# Patient Record
Sex: MALE | Race: WHITE | NOT HISPANIC OR LATINO | URBAN - METROPOLITAN AREA
[De-identification: names, ages, dates, MRNs, and addresses within clinical notes are randomized per-mention and may not be internally consistent; named-entity substitution may affect disease eponyms.]

---

## 2018-10-27 ENCOUNTER — EMERGENCY (EMERGENCY)
Facility: HOSPITAL | Age: 28
LOS: 1 days | Discharge: HOME | End: 2018-10-27
Admitting: EMERGENCY MEDICINE
Payer: MEDICAID

## 2018-10-27 VITALS
TEMPERATURE: 98 F | HEART RATE: 76 BPM | RESPIRATION RATE: 18 BRPM | OXYGEN SATURATION: 99 % | SYSTOLIC BLOOD PRESSURE: 137 MMHG | DIASTOLIC BLOOD PRESSURE: 71 MMHG

## 2018-10-27 PROCEDURE — 99283 EMERGENCY DEPT VISIT LOW MDM: CPT

## 2018-10-27 RX ORDER — HALOPERIDOL DECANOATE 100 MG/ML
5 INJECTION INTRAMUSCULAR ONCE
Qty: 0 | Refills: 0 | Status: COMPLETED | OUTPATIENT
Start: 2018-10-27 | End: 2018-10-27

## 2018-10-27 RX ORDER — DIPHENHYDRAMINE HCL 50 MG
50 CAPSULE ORAL ONCE
Qty: 0 | Refills: 0 | Status: COMPLETED | OUTPATIENT
Start: 2018-10-27 | End: 2018-10-27

## 2018-10-27 RX ORDER — CHLORPROMAZINE HCL 10 MG
50 TABLET ORAL ONCE
Qty: 0 | Refills: 0 | Status: COMPLETED | OUTPATIENT
Start: 2018-10-27 | End: 2018-10-27

## 2018-10-27 RX ADMIN — Medication 2 MILLIGRAM(S): at 21:15

## 2018-10-27 RX ADMIN — HALOPERIDOL DECANOATE 5 MILLIGRAM(S): 100 INJECTION INTRAMUSCULAR at 21:15

## 2018-10-27 RX ADMIN — Medication 50 MILLIGRAM(S): at 22:09

## 2018-10-27 NOTE — ED PROVIDER NOTE - MEDICAL DECISION MAKING DETAILS
27 y/o M  Medical evaluation performed. There is no clinical evidence of intoxication or any acute medical problem requiring immediate intervention. Recommend following up with [facility]. 27 y/o M  Medical evaluation performed. There is no clinical evidence of intoxication or any acute medical problem requiring immediate intervention. Recommend following up with [facility]. D/C to group home. Discuss plan with group home staff.

## 2018-10-27 NOTE — ED ADULT NURSE NOTE - OBJECTIVE STATEMENT
Patient received in  c/o agitation, Patient BIB EMS & [facility] in handcuffs. Patient remained agitated and uncooperative on presentation, unable to be verbally deescalated. Patient became physically aggressive when handcuffs was being taken off. Patient restrained for safety at 2115 hours and medicated with haldol 5mg and ativan 2mg IM for stabilization. 1:1 observation maintained. will continue to monitor.
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
## 2018-10-27 NOTE — ED PROVIDER NOTE - OBJECTIVE STATEMENT
29 y/o M BIBA w c/o increase agitation. Admits that he mentioned using a gun to family member because he was upset, but has no intention of using the gun . Patient explicitly denies suicidal ideations.  No evidence of physical injuries, broken skin or deformities. Denies falling, punching or kicking any objects.  Denies HI/AH/VH. Denies SOB , fever, chills, chest/alcohol discomfort.  Denies recent use of  alcohol or illicit drugs. 27 y/o M BIBA w c/o increase agitation while at group home.  Admits that he mentioned using a gun to family member because he was upset, but has no intention of using the gun . Patient explicitly denies suicidal ideations.  No evidence of physical injuries, broken skin or deformities. Denies falling, punching or kicking any objects.  Denies HI/AH/VH. Denies SOB , fever, chills, chest/alcohol discomfort.  Denies recent use of  alcohol or illicit drugs.
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
## 2018-10-28 VITALS
HEART RATE: 78 BPM | RESPIRATION RATE: 18 BRPM | OXYGEN SATURATION: 95 % | TEMPERATURE: 98 F | SYSTOLIC BLOOD PRESSURE: 106 MMHG | DIASTOLIC BLOOD PRESSURE: 70 MMHG

## 2018-10-28 PROBLEM — Z00.00 ENCOUNTER FOR PREVENTIVE HEALTH EXAMINATION: Status: ACTIVE | Noted: 2018-10-28

## 2018-10-28 NOTE — ED ADULT NURSE REASSESSMENT NOTE - NS ED NURSE REASSESS COMMENT FT1
Pt received at 12:00 am shift change. Pt discharged; Swati [phone] at pts residence made aware that EMS is currently at [facility] and pt will be returning. Pts exit vitals as noted. Pt remains drowsy, unable to sign dc paper.
Patient remains in restraints, restless and unpredictable. Patient medicated for safety and stabilization (see EMR). Will continue to monitor and reassess for readiness to come off restraints.
Patient calm at this time. criteria for restraints discontinuation met. restraints d/c'd at 2215. safety and comfort measures maintained. will continue to monitor.

## 2018-12-20 ENCOUNTER — EMERGENCY (EMERGENCY)
Facility: HOSPITAL | Age: 28
LOS: 1 days | Discharge: HOME | End: 2018-12-20
Attending: EMERGENCY MEDICINE | Admitting: EMERGENCY MEDICINE
Payer: MEDICAID

## 2018-12-20 ENCOUNTER — EMERGENCY (EMERGENCY)
Facility: HOSPITAL | Age: 28
LOS: 1 days | Discharge: HOME | End: 2018-12-20
Admitting: EMERGENCY MEDICINE
Payer: MEDICAID

## 2018-12-20 VITALS
OXYGEN SATURATION: 100 % | HEART RATE: 93 BPM | RESPIRATION RATE: 16 BRPM | DIASTOLIC BLOOD PRESSURE: 78 MMHG | SYSTOLIC BLOOD PRESSURE: 138 MMHG

## 2018-12-20 VITALS
SYSTOLIC BLOOD PRESSURE: 124 MMHG | DIASTOLIC BLOOD PRESSURE: 76 MMHG | HEART RATE: 76 BPM | RESPIRATION RATE: 16 BRPM | OXYGEN SATURATION: 100 % | TEMPERATURE: 98 F

## 2018-12-20 DIAGNOSIS — F79 UNSPECIFIED INTELLECTUAL DISABILITIES: ICD-10-CM

## 2018-12-20 DIAGNOSIS — R69 ILLNESS, UNSPECIFIED: ICD-10-CM

## 2018-12-20 DIAGNOSIS — F39 UNSPECIFIED MOOD [AFFECTIVE] DISORDER: ICD-10-CM

## 2018-12-20 PROCEDURE — 99283 EMERGENCY DEPT VISIT LOW MDM: CPT

## 2018-12-20 PROCEDURE — 90792 PSYCH DIAG EVAL W/MED SRVCS: CPT

## 2018-12-20 PROCEDURE — 99285 EMERGENCY DEPT VISIT HI MDM: CPT

## 2018-12-20 RX ORDER — DIPHENHYDRAMINE HCL 50 MG
50 CAPSULE ORAL ONCE
Qty: 0 | Refills: 0 | Status: COMPLETED | OUTPATIENT
Start: 2018-12-20 | End: 2018-12-20

## 2018-12-20 RX ORDER — HALOPERIDOL DECANOATE 100 MG/ML
5 INJECTION INTRAMUSCULAR ONCE
Qty: 0 | Refills: 0 | Status: COMPLETED | OUTPATIENT
Start: 2018-12-20 | End: 2018-12-20

## 2018-12-20 RX ORDER — TETANUS TOXOID, REDUCED DIPHTHERIA TOXOID AND ACELLULAR PERTUSSIS VACCINE, ADSORBED 5; 2.5; 8; 8; 2.5 [IU]/.5ML; [IU]/.5ML; UG/.5ML; UG/.5ML; UG/.5ML
0.5 SUSPENSION INTRAMUSCULAR ONCE
Qty: 0 | Refills: 0 | Status: COMPLETED | OUTPATIENT
Start: 2018-12-20 | End: 2018-12-20

## 2018-12-20 RX ADMIN — HALOPERIDOL DECANOATE 5 MILLIGRAM(S): 100 INJECTION INTRAMUSCULAR at 20:08

## 2018-12-20 RX ADMIN — Medication 2 MILLIGRAM(S): at 20:08

## 2018-12-20 RX ADMIN — TETANUS TOXOID, REDUCED DIPHTHERIA TOXOID AND ACELLULAR PERTUSSIS VACCINE, ADSORBED 0.5 MILLILITER(S): 5; 2.5; 8; 8; 2.5 SUSPENSION INTRAMUSCULAR at 12:21

## 2018-12-20 RX ADMIN — Medication 50 MILLIGRAM(S): at 20:08

## 2018-12-20 NOTE — ED BEHAVIORAL HEALTH ASSESSMENT NOTE - RISK ASSESSMENT
low. risks include poor frustration tolerance and impulsivity. Protective factors include no suicide attempts, no violence history, medication compliance, no access to guns, no global insomnia, no substance abuse, supportive family, willingness to seek help, no suicidal ideation or homicidal ideation, hopefulness for future.

## 2018-12-20 NOTE — ED ADULT NURSE REASSESSMENT NOTE - NS ED NURSE REASSESS COMMENT FT1
pt calm & cooperative back to baseline pt d/c by NP pt & staff transported via EMS to Respite program.
Yes, Non-Core measure site...

## 2018-12-20 NOTE — ED ADULT NURSE REASSESSMENT NOTE - NS ED NURSE REASSESS COMMENT FT1
Pt calm & cooperative d/c by MD pt verbalizing understanding of d/c instructions, pt currently denies Si/Hi/AVh resources provided to pt upon discharge.

## 2018-12-20 NOTE — ED BEHAVIORAL HEALTH ASSESSMENT NOTE - DESCRIPTION
calm and cooperative  ICU Vital Signs Last 24 Hrs  T(C): --  T(F): --  HR: 93 (20 Dec 2018 10:32) (93 - 93)  BP: 138/78 (20 Dec 2018 10:32) (138/78 - 138/78)  BP(mean): --  ABP: --  ABP(mean): --  RR: 16 (20 Dec 2018 10:32) (16 - 16)  SpO2: 100% (20 Dec 2018 10:32) (100% - 100%) denied lives in a psychiatric residence, disabled

## 2018-12-20 NOTE — ED PROVIDER NOTE - MEDICAL DECISION MAKING DETAILS
29 y/o MR male from group home BIB EMS for SI. Physical examination performed.  No clinical evidence of intoxication or any acute medical issues/problems requiring immediate interventions.  Psychiatric consultation requested and patient cleared for safe discharge . 27 y/o MR male from group [place] BIB EMS for SI. Physical examination performed.  Self inflicted wound to left shoulder cleaned, bacitracin applied and covered with clean, dry dressing.  No clinical evidence of intoxication or any acute medical issues/problems requiring immediate interventions.  Psychiatric consultation requested and patient cleared for safe discharge .

## 2018-12-20 NOTE — ED BEHAVIORAL HEALTH ASSESSMENT NOTE - SAFETY PLAN DETAILS
Extensive safety planning performed. Patient and staff agreeing verbally to return patient to ER or call 911 if symptoms worsen or patient has urges to harm self or others.
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
## 2018-12-20 NOTE — ED BEHAVIORAL HEALTH ASSESSMENT NOTE - SUICIDE PROTECTIVE FACTORS
Supportive social network or family/Identifies reasons for living/Future oriented/Engaged in work or school/Positive therapeutic relationships/Responsibility to family and others/Fear of death or dying due to pain/suffering
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
## 2018-12-20 NOTE — ED BEHAVIORAL HEALTH ASSESSMENT NOTE - OTHER PAST PSYCHIATRIC HISTORY (INCLUDE DETAILS REGARDING ONSET, COURSE OF ILLNESS, INPATIENT/OUTPATIENT TREATMENT)
PPH of mood disorder, intellectual disability and factitious disorder, 4 reported inpatient admissions (he cannot recall when last one was), no prior suicide attempts, no know history of substance abuse

## 2018-12-20 NOTE — ED PROVIDER NOTE - OBJECTIVE STATEMENT
29 y/o MR male from [facility] EMS for SI.  Pt is laughing and giddy in  triage area.  Seen by psychiatrist today and was just discharged 3 hours ago for the same complaint.  PT sarcastically c/o SI and HI.  Pt does not answer regarding AH/VH.

## 2018-12-20 NOTE — ED BEHAVIORAL HEALTH NOTE - BEHAVIORAL HEALTH NOTE
Have alerted  [facility] [phone] that the treating psychiatrist has cleared the patient for return to program. Have discussed options for out patient or walk in follow up at various clinics near to the respite program. Have also recommended staff press charges if patient becomes violent so as to reinforce consequences for his chosen maladaptive behaviors. [facility] is aware and will receive patient.

## 2018-12-20 NOTE — ED PROVIDER NOTE - SKIN, MLM
+abrasion to anterior left shoulder with dried blood, no active bleeding; neurovascularly intact in LUE

## 2018-12-20 NOTE — ED BEHAVIORAL HEALTH ASSESSMENT NOTE - CURRENT MEDICATION
divalproex 500mg po qam and 1000mg qHS; haloperidol 10mg po BID; benztropine 1mg po BID; docusate 100mg po TID, clonidine 0.1mg po TID; omeprazole 20mg po qAM; simvastatin 5mg po daily; hydroxy hcl 25mg po BID; levothyroxine 50mcg po daily; vitamin d3 1000IU po daily

## 2018-12-20 NOTE — ED ADULT NURSE NOTE - CHIEF COMPLAINT QUOTE
from adult home, took plastic and scratched left shoulder. pt was upset bc tita broke up with him. pt in handcuffs at this time. pmhx anxiety, mood disorder.

## 2018-12-20 NOTE — ED ADULT NURSE NOTE - OBJECTIVE STATEMENT
Received pt in  pt bought in for psych eval  as per EMS pt was threatening Si/Hi prior to arrival in the ED. pt calm & cooperative denies Si/Hi/AVh presently safety & comfort measures maintained eval on going.

## 2018-12-20 NOTE — ED BEHAVIORAL HEALTH ASSESSMENT NOTE - SUMMARY
29yo  male, single, domiciled with peers at the [facility] center, PPH of mood disorder, intellectual disability and factitious disorder, 4 reported inpatient admissions (he cannot recall when last one was), no prior suicide attempts, no know history of substance abuse, brought in by EMS for agitation.    Patient denies SI/HI/I/P as well as everett and psychosis. Admits to running out of respite to [facility] with purpose of getting away from staff due to feeling upset over incident yesterday. Patient denies suicidal intent requesting to go back to his group home. Patient denies SI/HI/SIB/intent/plan, depression, everett/hypomania, psychosis or any other mental health issues. Patient's behavior is behavioral in nature due to chronic poor frustration tolerance, impulsivity and impaired delayed instant gratification related to his cognitive limitations and not related to an exacerbation of an axis I diagnosis. Patient is future oriented and able to safety plan. Patient is not seeking and refused inpatient admission. Patient does not meet criteria for involuntary inpatient admission and has secondary gains. He will be discharged to group home and will follow up with outpatient provider. Extensive safety planning performed. Patient and staff agreeing verbally to return patient to ER or call 911 if symptoms worsen or patient has urges to harm self or others.

## 2018-12-20 NOTE — ED BEHAVIORAL HEALTH ASSESSMENT NOTE - SUICIDE PROTECTIVE FACTORS
Fear of death or dying due to pain/suffering/Engaged in work or school/Positive therapeutic relationships/Identifies reasons for living/Responsibility to family and others/Supportive social network or family/Future oriented

## 2018-12-20 NOTE — ED ADULT NURSE NOTE - OBJECTIVE STATEMENT
Received pt in  pt sent from day program for agitation , pt calm & cooperative denies Si/Hi/AVh presently emotional reassurance provided safety & comfort measures maintained eval on going. Received pt in  pt sent from day program for agitation & self harming behaviour pt noted with Right hand abrasion & Left shoulder abrasion, pt calm & cooperative denies Si/Hi/AVh presently emotional reassurance provided safety & comfort measures maintained eval on going.

## 2018-12-20 NOTE — ED BEHAVIORAL HEALTH ASSESSMENT NOTE - SUMMARY
27yo  male, single, domiciled with peers at the [facility], PPH of mood disorder, intellectual disability and factitious disorder, 4 reported inpatient admissions (he cannot recall when last one was), no prior suicide attempts, no know history of substance abuse, brought in by EMS, activated by day program, after patient became upset and took a plastic electrical outlet cover and cut his shoulder with it.     Patient denies SI/HI/I/P as well as everett and psychosis. Admits to cutting shoulder out of frustration secondary to girlfriend dumping him. Denies suicidal intent, requesting to go back to his group home. Staff at [facility] have no acute safety concerns. Patient does not meet criteria for inpatient admission and will be discharged home.

## 2018-12-20 NOTE — ED BEHAVIORAL HEALTH ASSESSMENT NOTE - HPI (INCLUDE ILLNESS QUALITY, SEVERITY, DURATION, TIMING, CONTEXT, MODIFYING FACTORS, ASSOCIATED SIGNS AND SYMPTOMS)
29yo  male, single, domiciled with peers at the [facility], PPH of mood disorder, intellectual disability and factitious disorder, 4 reported inpatient admissions (he cannot recall when last one was), no prior suicide attempts, no know history of substance abuse, brought in by EMS, activated by day program, after patient became upset and took a plastic electrical outlet cover and cut his shoulder with it.     collateral obtained from staff. She did not feel that patient was suicidal, only frustrated. She reports it is their protocol to send patient when they act as such. She has no acute safety concerns and will arrange transport home for patient.    Patient seen and examined. He reports he was frustrated that his girlfriend dumped him so he started cutting himself. When asked what had happened, he showed writer his forearm with a new tattoo that said "celine" on it. He said it was not his girlfriend's name, but his 1yo niece's name. When asked if girlfriend knew this, he said "no, do you think that's why she dumped me?" Patient denies SI/HI/I/P. Patient denies any depressive symptoms including depressed mood, anhedonia, changes in energy/concentration/appetite, sleep disturbances, or feelings of guilt. Patient denies manic symptoms including elevated mood, increased irritability, mood lability, distractibility, grandiosity, pressured speech, increase in goal-directed activity, or decreased need for sleep. Patient denies any psychotic symptoms including paranoia, ideas of reference, thought insertion/broadcasting, or auditory/visual/olfactory/tactile/gustatory hallucinations. Denies substance use. Patient would like to leave to try and reconcile with his girlfriend.

## 2018-12-20 NOTE — ED BEHAVIORAL HEALTH ASSESSMENT NOTE - RISK ASSESSMENT
risks include chronic poor frustration tolerance and impulsivity. Protective factors include no suicide attempts, no violence history, medication compliance, no access to guns, no global insomnia, no suicidal ideation/intent/plan, future oriented, no substance abuse, supportive family, able to safety plan, willingness to seek help, no homicidal ideation, hopefulness for future, no everett/psychosis/depression.

## 2018-12-20 NOTE — ED BEHAVIORAL HEALTH ASSESSMENT NOTE - OTHER
residential program peers scratch to left shoulder and right posterior hand noted concrete limited due to intellectual disability
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
## 2018-12-20 NOTE — ED BEHAVIORAL HEALTH NOTE - BEHAVIORAL HEALTH NOTE
This worker has met with the patient's staff member Hernando for collateral.     Patient is presenting for the second time today. After returning from the hospital the patient was observed as agitated and had reported that "he wanted to commit suicide." He proceeded to punch an electrical outlet and use a piece of plastic to cut his chest reporting that he wished to die. He then went walking in traffic "with no regard for his life." Patient then went to [facility] and requested that they call 911 to have him sent to the hospital. No known precipitant is known to Hernando. He is unable to provide additional information and requests that this worker call  [facility] [phone].

## 2018-12-20 NOTE — ED BEHAVIORAL HEALTH ASSESSMENT NOTE - DETAILS
mild pain to left shoulder where he cut himself- informed NP Kathya Vazquez staff notified see HPI zyprexa, rash

## 2018-12-20 NOTE — ED BEHAVIORAL HEALTH ASSESSMENT NOTE - OTHER
residential program peers same scratch to left shoulder and right posterior hand noted from this AM concrete limited due to intellectual disability

## 2018-12-20 NOTE — ED ADULT TRIAGE NOTE - CHIEF COMPLAINT QUOTE
from adult home, took plastic and scratched left shoulder. pt was upset bc tita broke up with him. pt in handcuffs at this time. pmhx anxiety, mood disorder. 1 person assist

## 2018-12-20 NOTE — ED BEHAVIORAL HEALTH ASSESSMENT NOTE - DESCRIPTION
During course of ED visit patient was calm and cooperative. Patient was not aggressive or violent.     Vital Signs Last 24 Hrs  T(C): 36.7 (20 Dec 2018 18:50), Max: 36.7 (20 Dec 2018 18:50)  T(F): 98.1 (20 Dec 2018 18:50), Max: 98.1 (20 Dec 2018 18:50)  HR: 76 (20 Dec 2018 18:50) (76 - 93)  BP: 124/76 (20 Dec 2018 18:50) (124/76 - 138/78)  BP(mean): --  RR: 16 (20 Dec 2018 18:50) (16 - 16)  SpO2: 100% (20 Dec 2018 18:50) (100% - 100%) lives in a psychiatric residence, disabled denied

## 2018-12-21 ENCOUNTER — EMERGENCY (EMERGENCY)
Facility: HOSPITAL | Age: 28
LOS: 1 days | Discharge: HOME | End: 2018-12-21
Admitting: EMERGENCY MEDICINE
Payer: MEDICAID

## 2018-12-21 VITALS
SYSTOLIC BLOOD PRESSURE: 116 MMHG | RESPIRATION RATE: 18 BRPM | HEART RATE: 102 BPM | TEMPERATURE: 98 F | DIASTOLIC BLOOD PRESSURE: 82 MMHG | OXYGEN SATURATION: 97 %

## 2018-12-21 VITALS
OXYGEN SATURATION: 100 % | SYSTOLIC BLOOD PRESSURE: 112 MMHG | DIASTOLIC BLOOD PRESSURE: 76 MMHG | TEMPERATURE: 98 F | HEART RATE: 90 BPM | RESPIRATION RATE: 16 BRPM

## 2018-12-21 PROCEDURE — 99284 EMERGENCY DEPT VISIT MOD MDM: CPT

## 2018-12-21 PROCEDURE — 90792 PSYCH DIAG EVAL W/MED SRVCS: CPT

## 2018-12-21 RX ORDER — HALOPERIDOL DECANOATE 100 MG/ML
5 INJECTION INTRAMUSCULAR ONCE
Qty: 0 | Refills: 0 | Status: COMPLETED | OUTPATIENT
Start: 2018-12-21 | End: 2018-12-21

## 2018-12-21 RX ORDER — BACITRACIN ZINC 500 UNIT/G
1 OINTMENT IN PACKET (EA) TOPICAL ONCE
Qty: 0 | Refills: 0 | Status: COMPLETED | OUTPATIENT
Start: 2018-12-21 | End: 2018-12-21

## 2018-12-21 RX ADMIN — HALOPERIDOL DECANOATE 5 MILLIGRAM(S): 100 INJECTION INTRAMUSCULAR at 19:42

## 2018-12-21 RX ADMIN — Medication 1 APPLICATION(S): at 19:42

## 2018-12-21 RX ADMIN — Medication 2 MILLIGRAM(S): at 19:42

## 2018-12-21 NOTE — ED BEHAVIORAL HEALTH ASSESSMENT NOTE - OTHER
staff residential program peers same scratch to left shoulder and right posterior hand noted from yesterday concrete limited due to intellectual disability
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
## 2018-12-21 NOTE — ED BEHAVIORAL HEALTH ASSESSMENT NOTE - DETAILS
see HPI zyprexa, rash mild pain to left shoulder where he cut himself- informed NP Tom Chow staff notified

## 2018-12-21 NOTE — ED ADULT NURSE NOTE - OBJECTIVE STATEMENT
Received pt in  pt bought in by EMS / [employer] from [facility] in [place]. as per EMS pt was making Si/Hi statements, pt laughing & joking in  holding area safety & comfort measures maintained eval on going.
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
## 2018-12-21 NOTE — ED BEHAVIORAL HEALTH ASSESSMENT NOTE - CASE SUMMARY
28M with mood disorder and intellectual disability presents with EMS after asking [facility] to call an ambulance because he had a plastic knife. Patient very clearly states he enjoys being in the hospital and does not like the staff at his facility. He denies any intention or plan to harm self, denies wishes to be dead. No other acute mood/psychotic symptoms on exam. No homicidal ideation. in Good behavioral control. No indication for admission as presentation is behaviorally driven, with secondary gain of liking attention received from staff in addition to dislike of facility. Patient has chronically poor frustration tolerance and coping skills due to intellectual disability. DC

## 2018-12-21 NOTE — ED BEHAVIORAL HEALTH ASSESSMENT NOTE - DESCRIPTION
During course of ED visit patient was calm and cooperative. Patient was not aggressive or violent.     Vital Signs Last 24 Hrs  T(C): 36.7 (21 Dec 2018 15:04), Max: 36.7 (20 Dec 2018 18:50)  T(F): 98.1 (21 Dec 2018 15:04), Max: 98.1 (20 Dec 2018 18:50)  HR: 102 (21 Dec 2018 15:04) (76 - 102)  BP: 116/82 (21 Dec 2018 15:04) (116/82 - 124/76)  BP(mean): --  RR: 18 (21 Dec 2018 15:04) (16 - 18)  SpO2: 97% (21 Dec 2018 15:04) (97% - 100%) denied lives in a psychiatric residence, disabled

## 2018-12-21 NOTE — ED PROVIDER NOTE - OBJECTIVE STATEMENT
27 y/o M  hx BIBA w c/o increase agitation passive suicidal ideations. EMS reports that patient  ran into a 7 /[facility] stating that he wants to die.  Presents calm in ER, admitting to passive suicidal thoughts.  No evidence of physical injuries, broken skin or deformities. Denies falling, punching or kicking any objects.  Denies HI/AH/VH. Denies pain , SOB , fever, chill, chest/alcohol discomfort.  Denies recent use of  alcohol or illicit drugs. 27 y/o M  hx Mood D/O , Intellectual Disability, Impulse Control D/O  BIBA w c/o increase agitation passive suicidal ideations. EMS reports that patient  ran into a 7 /11 stating that he wants to die.  Presents calm in ER, admitting to passive suicidal thoughts.  No evidence of physical injuries, broken skin or deformities. Denies falling, punching or kicking any objects.  Denies HI/AH/VH. Denies pain , SOB , fever, chill, chest/alcohol discomfort.  Denies recent use of  alcohol or illicit drugs.

## 2018-12-21 NOTE — ED PROVIDER NOTE - MEDICAL DECISION MAKING DETAILS
29 y/o M  hx Mood D/O , Intellectual Disability, Impulse Control D/O   Medication offered.  Medical evaluation performed. There is no clinical evidence of intoxication or any acute medical problem requiring immediate intervention. Patient is awaiting psychiatric consultation. Final disposition will be determined by psychiatrist. D/C to group home via EMS.. 27 y/o M  hx Mood D/O , Intellectual Disability, Impulse Control D/O   Medication offered.  Medical evaluation performed. There is no clinical evidence of intoxication or any acute medical problem requiring immediate intervention. Patient is awaiting psychiatric consultation. Final disposition will be determined by psychiatrist. D/C to group home via EMS.. Abrasion to left shoulder and right hand. bacitracin to area. Tetanus up to date

## 2018-12-21 NOTE — ED BEHAVIORAL HEALTH ASSESSMENT NOTE - SUICIDE PROTECTIVE FACTORS
Positive therapeutic relationships/Responsibility to family and others/Fear of death or dying due to pain/suffering/Engaged in work or school/Identifies reasons for living/Future oriented/Supportive social network or family

## 2019-01-17 ENCOUNTER — EMERGENCY (EMERGENCY)
Facility: HOSPITAL | Age: 29
LOS: 1 days | Discharge: HOME | End: 2019-01-17
Admitting: EMERGENCY MEDICINE
Payer: MEDICAID

## 2019-01-17 VITALS
RESPIRATION RATE: 17 BRPM | OXYGEN SATURATION: 100 % | DIASTOLIC BLOOD PRESSURE: 79 MMHG | HEART RATE: 90 BPM | SYSTOLIC BLOOD PRESSURE: 118 MMHG | TEMPERATURE: 98 F

## 2019-01-17 PROCEDURE — 99283 EMERGENCY DEPT VISIT LOW MDM: CPT

## 2019-01-17 RX ORDER — CHLORPROMAZINE HCL 10 MG
50 TABLET ORAL ONCE
Qty: 0 | Refills: 0 | Status: COMPLETED | OUTPATIENT
Start: 2019-01-17 | End: 2019-01-17

## 2019-01-17 RX ORDER — DIPHENHYDRAMINE HCL 50 MG
50 CAPSULE ORAL ONCE
Qty: 0 | Refills: 0 | Status: COMPLETED | OUTPATIENT
Start: 2019-01-17 | End: 2019-01-17

## 2019-01-17 RX ADMIN — Medication 50 MILLIGRAM(S): at 20:44
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
## 2019-01-17 NOTE — ED PROVIDER NOTE - MEDICAL DECISION MAKING DETAILS
29 y/o M  hx Mood D/O , Intellectual Disability, Impulse Control D/O   Medication offered ,tolerated  same well.     Medical evaluation performed. There is no clinical evidence of intoxication or any acute medical problem requiring immediate intervention. Discuss plan with accompanying  Respite staff. Recommend following up  with PCP. D/C to Respite via Company's Vehicle accompanied by staff.
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
## 2019-01-17 NOTE — ED ADULT NURSE NOTE - NSIMPLEMENTINTERV_GEN_ALL_ED
Implemented All Universal Safety Interventions:  [place] to call system. Call bell, personal items and telephone within reach. Instruct patient to call for assistance. Room bathroom lighting operational. Non-slip footwear when patient is off stretcher. Physically safe environment: no spills, clutter or unnecessary equipment. Stretcher in lowest position, wheels locked, appropriate side rails in place.

## 2019-01-17 NOTE — ED ADULT NURSE REASSESSMENT NOTE - NS ED NURSE REASSESS COMMENT FT1
Pt remains awake, calm at baseline mental status. Denies s/i or intent harm others. d/c instructions provided to pt with verbalized understanding. D/c to respite via private auto set up by residence staff.
Patient in improved and stable condition, discharged as per NP Pellew order, discharge instructions given, pt verbalized understanding and left ER a&ox3 with staff from residence.

## 2019-01-18 PROBLEM — F79 UNSPECIFIED INTELLECTUAL DISABILITIES: Chronic | Status: ACTIVE | Noted: 2018-12-21

## 2019-01-27 ENCOUNTER — EMERGENCY (EMERGENCY)
Facility: HOSPITAL | Age: 29
LOS: 1 days | Discharge: HOME | End: 2019-01-27
Attending: EMERGENCY MEDICINE
Payer: MEDICAID

## 2019-01-27 VITALS
DIASTOLIC BLOOD PRESSURE: 86 MMHG | TEMPERATURE: 98 F | WEIGHT: 259.93 LBS | RESPIRATION RATE: 16 BRPM | OXYGEN SATURATION: 98 % | HEIGHT: 71 IN | HEART RATE: 86 BPM | SYSTOLIC BLOOD PRESSURE: 130 MMHG

## 2019-01-27 PROCEDURE — 99285 EMERGENCY DEPT VISIT HI MDM: CPT | Mod: 25

## 2019-01-27 RX ORDER — SODIUM CHLORIDE 9 MG/ML
3 INJECTION INTRAMUSCULAR; INTRAVENOUS; SUBCUTANEOUS ONCE
Refills: 0 | Status: COMPLETED | OUTPATIENT
Start: 2019-01-27 | End: 2019-01-27

## 2019-01-27 RX ORDER — ACETAMINOPHEN 500 MG
1000 TABLET ORAL ONCE
Refills: 0 | Status: COMPLETED | OUTPATIENT
Start: 2019-01-27 | End: 2019-01-28

## 2019-01-27 NOTE — ED ADULT NURSE NOTE - NSIMPLEMENTINTERV_GEN_ALL_ED
Implemented All Universal Safety Interventions:  [place] to call system. Call bell, personal items and telephone within reach. Instruct patient to call for assistance. Room bathroom lighting operational. Non-slip footwear when patient is off stretcher. Physically safe environment: no spills, clutter or unnecessary equipment. Stretcher in lowest position, wheels locked, appropriate side rails in place.

## 2019-01-28 VITALS
DIASTOLIC BLOOD PRESSURE: 68 MMHG | TEMPERATURE: 98 F | SYSTOLIC BLOOD PRESSURE: 116 MMHG | RESPIRATION RATE: 18 BRPM | OXYGEN SATURATION: 98 % | HEART RATE: 70 BPM

## 2019-01-28 LAB
ALBUMIN SERPL ELPH-MCNC: 3.3 G/DL — LOW (ref 3.5–5)
ALP SERPL-CCNC: 79 U/L — SIGNIFICANT CHANGE UP (ref 40–120)
ALT FLD-CCNC: 18 U/L DA — SIGNIFICANT CHANGE UP (ref 10–60)
ANION GAP SERPL CALC-SCNC: 6 MMOL/L — SIGNIFICANT CHANGE UP (ref 5–17)
AST SERPL-CCNC: 8 U/L — LOW (ref 10–40)
BASOPHILS # BLD AUTO: 0.1 K/UL — SIGNIFICANT CHANGE UP (ref 0–0.2)
BASOPHILS NFR BLD AUTO: 1.6 % — SIGNIFICANT CHANGE UP (ref 0–2)
BILIRUB SERPL-MCNC: 0.2 MG/DL — SIGNIFICANT CHANGE UP (ref 0.2–1.2)
BUN SERPL-MCNC: 22 MG/DL — HIGH (ref 7–18)
CALCIUM SERPL-MCNC: 8.2 MG/DL — LOW (ref 8.4–10.5)
CHLORIDE SERPL-SCNC: 104 MMOL/L — SIGNIFICANT CHANGE UP (ref 96–108)
CO2 SERPL-SCNC: 31 MMOL/L — SIGNIFICANT CHANGE UP (ref 22–31)
CREAT SERPL-MCNC: 0.91 MG/DL — SIGNIFICANT CHANGE UP (ref 0.5–1.3)
EOSINOPHIL # BLD AUTO: 0.1 K/UL — SIGNIFICANT CHANGE UP (ref 0–0.5)
EOSINOPHIL NFR BLD AUTO: 1.4 % — SIGNIFICANT CHANGE UP (ref 0–6)
GLUCOSE SERPL-MCNC: 114 MG/DL — HIGH (ref 70–99)
HCT VFR BLD CALC: 42.7 % — SIGNIFICANT CHANGE UP (ref 39–50)
HGB BLD-MCNC: 13.6 G/DL — SIGNIFICANT CHANGE UP (ref 13–17)
LYMPHOCYTES # BLD AUTO: 3.5 K/UL — HIGH (ref 1–3.3)
LYMPHOCYTES # BLD AUTO: 44.8 % — HIGH (ref 13–44)
MCHC RBC-ENTMCNC: 26 PG — LOW (ref 27–34)
MCHC RBC-ENTMCNC: 31.8 GM/DL — LOW (ref 32–36)
MCV RBC AUTO: 81.9 FL — SIGNIFICANT CHANGE UP (ref 80–100)
MONOCYTES # BLD AUTO: 0.3 K/UL — SIGNIFICANT CHANGE UP (ref 0–0.9)
MONOCYTES NFR BLD AUTO: 4.4 % — SIGNIFICANT CHANGE UP (ref 2–14)
NEUTROPHILS # BLD AUTO: 3.8 K/UL — SIGNIFICANT CHANGE UP (ref 1.8–7.4)
NEUTROPHILS NFR BLD AUTO: 47.8 % — SIGNIFICANT CHANGE UP (ref 43–77)
PLATELET # BLD AUTO: 173 K/UL — SIGNIFICANT CHANGE UP (ref 150–400)
POTASSIUM SERPL-MCNC: 4.3 MMOL/L — SIGNIFICANT CHANGE UP (ref 3.5–5.3)
POTASSIUM SERPL-SCNC: 4.3 MMOL/L — SIGNIFICANT CHANGE UP (ref 3.5–5.3)
PROT SERPL-MCNC: 6.3 G/DL — SIGNIFICANT CHANGE UP (ref 6–8.3)
RBC # BLD: 5.21 M/UL — SIGNIFICANT CHANGE UP (ref 4.2–5.8)
RBC # FLD: 14.2 % — SIGNIFICANT CHANGE UP (ref 10.3–14.5)
SODIUM SERPL-SCNC: 141 MMOL/L — SIGNIFICANT CHANGE UP (ref 135–145)
TROPONIN I SERPL-MCNC: <0.015 NG/ML — SIGNIFICANT CHANGE UP (ref 0–0.04)
TROPONIN I SERPL-MCNC: <0.015 NG/ML — SIGNIFICANT CHANGE UP (ref 0–0.04)
WBC # BLD: 7.9 K/UL — SIGNIFICANT CHANGE UP (ref 3.8–10.5)
WBC # FLD AUTO: 7.9 K/UL — SIGNIFICANT CHANGE UP (ref 3.8–10.5)

## 2019-01-28 PROCEDURE — 71046 X-RAY EXAM CHEST 2 VIEWS: CPT | Mod: 26

## 2019-01-28 RX ORDER — KETOROLAC TROMETHAMINE 30 MG/ML
30 SYRINGE (ML) INJECTION ONCE
Refills: 0 | Status: DISCONTINUED | OUTPATIENT
Start: 2019-01-28 | End: 2019-01-28

## 2019-01-28 RX ADMIN — Medication 400 MILLIGRAM(S): at 00:45

## 2019-01-28 RX ADMIN — SODIUM CHLORIDE 3 MILLILITER(S): 9 INJECTION INTRAMUSCULAR; INTRAVENOUS; SUBCUTANEOUS at 00:45

## 2019-01-28 RX ADMIN — Medication 30 MILLIGRAM(S): at 04:27

## 2019-01-28 NOTE — ED PROVIDER NOTE - PROGRESS NOTE DETAILS
labs show neg trop x2, CXR unremarkable  discussed above with patient and caretaker, on reeval patient well-appearing, report pain has improved. pt stable for discharge.

## 2019-01-28 NOTE — ED PROVIDER NOTE - OBJECTIVE STATEMENT
29 y/o M w/ PMHx of asthma, GERD, obesity, presents to ED c/o chest pain x 1100. Pt denies any cough, fever, recent travel or any other complaints. Pt resides in a group home for psychiatric issues.

## 2019-01-28 NOTE — ED PROVIDER NOTE - MEDICAL DECISION MAKING DETAILS
29 y/o M w/ PMHx of asthma, GERD, obesity, presents to ED c/o chest pain x 1100am. Will obtain EKG, labs, CXR.

## 2019-03-26 ENCOUNTER — EMERGENCY (EMERGENCY)
Facility: HOSPITAL | Age: 29
LOS: 1 days | Discharge: HOME | End: 2019-03-26
Attending: EMERGENCY MEDICINE | Admitting: EMERGENCY MEDICINE
Payer: MEDICAID

## 2019-03-26 VITALS
HEART RATE: 78 BPM | TEMPERATURE: 98 F | SYSTOLIC BLOOD PRESSURE: 130 MMHG | RESPIRATION RATE: 15 BRPM | OXYGEN SATURATION: 100 % | DIASTOLIC BLOOD PRESSURE: 91 MMHG

## 2019-03-26 LAB
APPEARANCE UR: CLEAR — SIGNIFICANT CHANGE UP
BILIRUB UR-MCNC: NEGATIVE — SIGNIFICANT CHANGE UP
BLOOD UR QL VISUAL: NEGATIVE — SIGNIFICANT CHANGE UP
COLOR SPEC: YELLOW — SIGNIFICANT CHANGE UP
GLUCOSE UR-MCNC: NEGATIVE — SIGNIFICANT CHANGE UP
HIV COMBO RESULT: SIGNIFICANT CHANGE UP
HIV1+2 AB SPEC QL: SIGNIFICANT CHANGE UP
KETONES UR-MCNC: NEGATIVE — SIGNIFICANT CHANGE UP
LEUKOCYTE ESTERASE UR-ACNC: NEGATIVE — SIGNIFICANT CHANGE UP
NITRITE UR-MCNC: NEGATIVE — SIGNIFICANT CHANGE UP
PH UR: 6 — SIGNIFICANT CHANGE UP (ref 5–8)
PROT UR-MCNC: 10 — SIGNIFICANT CHANGE UP
SP GR SPEC: 1.03 — SIGNIFICANT CHANGE UP (ref 1–1.04)
UROBILINOGEN FLD QL: NORMAL — SIGNIFICANT CHANGE UP

## 2019-03-26 PROCEDURE — 99283 EMERGENCY DEPT VISIT LOW MDM: CPT

## 2019-03-26 RX ORDER — TETANUS TOXOID, REDUCED DIPHTHERIA TOXOID AND ACELLULAR PERTUSSIS VACCINE, ADSORBED 5; 2.5; 8; 8; 2.5 [IU]/.5ML; [IU]/.5ML; UG/.5ML; UG/.5ML; UG/.5ML
0.5 SUSPENSION INTRAMUSCULAR ONCE
Qty: 0 | Refills: 0 | Status: COMPLETED | OUTPATIENT
Start: 2019-03-26 | End: 2019-03-26

## 2019-03-26 RX ADMIN — TETANUS TOXOID, REDUCED DIPHTHERIA TOXOID AND ACELLULAR PERTUSSIS VACCINE, ADSORBED 0.5 MILLILITER(S): 5; 2.5; 8; 8; 2.5 SUSPENSION INTRAMUSCULAR at 18:59

## 2019-03-26 NOTE — ED PROVIDER NOTE - NSFOLLOWUPCLINICS_GEN_ALL_ED_FT
[facility] - Primary Care  Primary Care  [address] [place] [address]  Phone: [phone]  Fax:   Follow Up Time: 1-3 Days
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
## 2019-03-26 NOTE — ED PROVIDER NOTE - PHYSICAL EXAMINATION
Oneil CHAU MD PGY1:   PHYSICAL EXAM:    GENERAL: NAD, well-developed  HEENT:  Atraumatic, Normocephalic  CHEST/LUNG: Chest rise equal bilaterally  HEART: Regular rate and rhythm  ABDOMEN: Soft, Nontender, Nondistended  EXTREMITIES:  2+ Peripheral Pulses.  PSYCH: A&Ox3  Skin : No obvious rashes.

## 2019-03-26 NOTE — PROVIDER CONTACT NOTE (OTHER) - ASSESSMENT
Pt was missing when Ambulance came. Spoke with his  Bryce [phone] and was told his HHA took pt and he is in house.
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
## 2019-03-26 NOTE — ED PROVIDER NOTE - NSFOLLOWUPINSTRUCTIONS_ED_ALL_ED_FT
Please follow-up with a primary care doctor in the next few days. Please return to the ED for any concerns.

## 2019-03-26 NOTE — ED PROVIDER NOTE - CLINICAL SUMMARY MEDICAL DECISION MAKING FREE TEXT BOX
Oneil CHAU MD PGY1: 28 M PMH MR with episodes of self harm without suicidal ideation or HI, here for abrasion to head. Not amenable to laceration. Will obtain UA for UTI screen and STI screen per pt request. Pt provided with fleet enema that has helped him in the past.

## 2019-03-26 NOTE — ED PROVIDER NOTE - OBJECTIVE STATEMENT
Oneil CHAU MD PGY1: 28 M PMH MR and behavioural issues currently in crisis rehab center for recurrent episodes of self harm without suicidal ideation here today for assessment of L forehead abrasion sustained while hitting head against door while upset that his sister hung up the phone on him. Patient also picks at scar that he has on his L wrist and L shoulder. Complains of chronic dysuria and constipation and requests and enema. Patient's wound to wrist is not new, unlike mentioned in triage note.

## 2019-03-27 ENCOUNTER — EMERGENCY (EMERGENCY)
Facility: HOSPITAL | Age: 29
LOS: 1 days | Discharge: HOME | End: 2019-03-27
Admitting: EMERGENCY MEDICINE
Payer: MEDICAID

## 2019-03-27 VITALS
RESPIRATION RATE: 16 BRPM | SYSTOLIC BLOOD PRESSURE: 118 MMHG | DIASTOLIC BLOOD PRESSURE: 91 MMHG | OXYGEN SATURATION: 100 % | TEMPERATURE: 98 F | HEART RATE: 97 BPM

## 2019-03-27 LAB
C TRACH RRNA SPEC QL NAA+PROBE: SIGNIFICANT CHANGE UP
N GONORRHOEA RRNA SPEC QL NAA+PROBE: SIGNIFICANT CHANGE UP
SPECIMEN SOURCE: SIGNIFICANT CHANGE UP

## 2019-03-27 PROCEDURE — 99284 EMERGENCY DEPT VISIT MOD MDM: CPT

## 2019-03-27 RX ORDER — CHLORPROMAZINE HCL 10 MG
50 TABLET ORAL ONCE
Qty: 0 | Refills: 0 | Status: COMPLETED | OUTPATIENT
Start: 2019-03-27 | End: 2019-03-27

## 2019-03-27 RX ADMIN — Medication 50 MILLIGRAM(S): at 22:34

## 2019-03-27 RX ADMIN — Medication 2 MILLIGRAM(S): at 22:35

## 2019-03-27 NOTE — ED PROVIDER NOTE - NSFOLLOWUPINSTRUCTIONS_ED_ALL_ED_FT
Keep abrasions clean and dry.  Apply bacitracin twice daily for 7 days.  Follow up with out patient provider.
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
## 2019-03-27 NOTE — ED PROVIDER NOTE - OBJECTIVE STATEMENT
29 y/o pt presents to  for aggressive behavior at his group home.  Pt states: "I was having girlfriend problems and I got upset".  PT with MR, ADHD and impulse control disorder and is well known to  for same behavior.  Pt is alert and oriented x 3, denies SI/HI/AH/VH.  Pt is smiling, calm and cooperative in  area and denies any other c/o pain or discomfort. 27 y/o pt presents to  for aggressive behavior at his group home.  Pt states: "I was having girlfriend problems and I got upset".  PT with MR, ADHD and impulse control disorder and is well known to  for same behavior.  Pt is alert and oriented x 3, denies SI/HI/AH/VH.  PT with 2 self inflicted abrasions to his left hand.  Cleaned with peroxide.  Bacitracin applied.  Instructions to keep dry.  Pt is smiling, calm and cooperative in  area and denies any other c/o pain or discomfort.

## 2019-03-28 ENCOUNTER — EMERGENCY (EMERGENCY)
Facility: HOSPITAL | Age: 29
LOS: 1 days | Discharge: HOME | End: 2019-03-28
Attending: EMERGENCY MEDICINE | Admitting: EMERGENCY MEDICINE
Payer: MEDICAID

## 2019-03-28 VITALS
HEART RATE: 98 BPM | RESPIRATION RATE: 18 BRPM | TEMPERATURE: 98 F | OXYGEN SATURATION: 100 % | SYSTOLIC BLOOD PRESSURE: 109 MMHG | DIASTOLIC BLOOD PRESSURE: 87 MMHG

## 2019-03-28 PROCEDURE — 99284 EMERGENCY DEPT VISIT MOD MDM: CPT | Mod: 25

## 2019-03-28 RX ORDER — DIPHENHYDRAMINE HCL 50 MG
50 CAPSULE ORAL ONCE
Qty: 0 | Refills: 0 | Status: COMPLETED | OUTPATIENT
Start: 2019-03-28 | End: 2019-03-28

## 2019-03-28 RX ORDER — CHLORPROMAZINE HCL 10 MG
50 TABLET ORAL ONCE
Qty: 0 | Refills: 0 | Status: COMPLETED | OUTPATIENT
Start: 2019-03-28 | End: 2019-03-28

## 2019-03-28 RX ORDER — ACETAMINOPHEN 500 MG
975 TABLET ORAL ONCE
Qty: 0 | Refills: 0 | Status: COMPLETED | OUTPATIENT
Start: 2019-03-28 | End: 2019-03-28

## 2019-03-28 RX ADMIN — Medication 975 MILLIGRAM(S): at 23:30

## 2019-03-28 RX ADMIN — Medication 50 MILLIGRAM(S): at 22:35

## 2019-03-28 NOTE — ED PROVIDER NOTE - CARE PLAN
Principal Discharge DX:	Impulse control disorder Principal Discharge DX:	Impulse control disorder  Secondary Diagnosis:	Cellulitis of hand

## 2019-03-28 NOTE — ED ADULT NURSE NOTE - ED STAT RN HANDOFF DETAILS
Pt discharged by provider with instructions.  Pt verbalizes understanding.  Pt denies SI/HI.  Pt denies visual or auditory hallucinations or other complaints.  Pt leaving ED ambulatory with caregivers via uber.

## 2019-03-28 NOTE — ED PROVIDER NOTE - CLINICAL SUMMARY MEDICAL DECISION MAKING FREE TEXT BOX
likely agitated due to underlying MR/impulse control disorder and med noncompliance. will r/o hyp/hyperglycemia, ICH.  Pt required anxiolysis in ED as not cooperative on arrival ,refused vitals and evaluation.     L hand pain s/p injury, appears infected. will r/o underlying fx and treat with abx. s/p tetanus shot 2 days ago.
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
## 2019-03-28 NOTE — ED PROVIDER NOTE - PROGRESS NOTE DETAILS
Pt signed out to Dr. Baker pending CT head and Xray of hand and re-evaluation. Sign out follow-up: CTH and XR hand no acute process. Pt has been calmly sleeping. Staff members here to take pt back to group home. Episode c/w pt's h/o  and impulse d/oAranza SIMS.

## 2019-03-28 NOTE — ED ADULT NURSE NOTE - CHPI ED NUR SYMPTOMS NEG
no dizziness/no weakness/no chills/no fever/no pain/no tingling/no vomiting/no decreased eating/drinking/no nausea

## 2019-03-28 NOTE — ED PROVIDER NOTE - NSFOLLOWUPINSTRUCTIONS_ED_ALL_ED_FT
1. FOR PAIN OR FEVER YOU CAN TAKE IBUPROFEN (MOTRIN, ADVIL) OR ACETAMINOPHEN (TYLENOL) AS NEEDED, AS DIRECTED ON PACKAGING.  2. FOLLOW-UP WITH YOUR PRIMARY CARE DOCTOR IN 3-5 DAYS.  3. IF YOU HAD LABS OR IMAGING DONE, YOU WERE GIVEN COPIES OF ALL LABS AND/OR IMAGING RESULTS FROM YOUR ER VISIT--PLEASE TAKE THEM WITH YOU TO YOUR FOLLOW UP APPOINTMENTS.  4. IF NEEDED, CALL PATIENT ACCESS SERVICES AT [phone] TO FIND A PRIMARY CARE PHYSICIAN.  OR CALL [phone] TO MAKE AN APPOINTMENT WITH THE CLINIC.  5. RETURN TO THE ER FOR ANY WORSENING SYMPTOMS OR CONCERNS. 1. FOR PAIN OR FEVER YOU CAN TAKE IBUPROFEN (MOTRIN, ADVIL) OR ACETAMINOPHEN (TYLENOL) AS NEEDED, AS DIRECTED ON PACKAGING.  2. FOLLOW-UP WITH YOUR PRIMARY CARE DOCTOR IN 3-5 DAYS.  3. IF YOU HAD LABS OR IMAGING DONE, YOU WERE GIVEN COPIES OF ALL LABS AND/OR IMAGING RESULTS FROM YOUR ER VISIT--PLEASE TAKE THEM WITH YOU TO YOUR FOLLOW UP APPOINTMENTS.  4. IF NEEDED, CALL PATIENT ACCESS SERVICES AT [phone] TO FIND A PRIMARY CARE PHYSICIAN.  OR CALL [phone] TO MAKE AN APPOINTMENT WITH THE CLINIC.  5. RETURN TO THE ER FOR ANY WORSENING SYMPTOMS OR CONCERNS.  6. Take Doxycycline 100 mg every 12 hours for 7 days for infection of skin on hand.
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
## 2019-03-28 NOTE — ED PROVIDER NOTE - PHYSICAL EXAMINATION
GEN - awake, rocking back and forth, answering questions, alert  HEAD - healing abrasion to forehead   EYES - EOMI, no conjunctival pallor, no scleral icterus  ENT -   mucous membranes  moist , no discharge      NECK - Neck supple, no midline tenderness  PULM - CTA b/l,  symmetric breath sounds  COR -  RRR, S1 S2, no murmurs  ABD - , ND, NT, soft, no guarding, no rebound, no masses    BACK - no CVA tenderness, nontender spine     EXTREMS - abrasions to L hand over 1st MCP and dorsum. +surrounding erythema. No pus. No deformity. able to  SKIN - no rash or bruising      NEUROLOGIC - alert, sensation nl, motor 5/5 RUE/LUE/RLE/LLE

## 2019-03-29 PROCEDURE — 73130 X-RAY EXAM OF HAND: CPT | Mod: 26,LT

## 2019-03-29 PROCEDURE — 70450 CT HEAD/BRAIN W/O DYE: CPT | Mod: 26

## 2019-03-29 RX ADMIN — Medication 100 MILLIGRAM(S): at 01:05

## 2019-03-29 NOTE — ED ADULT NURSE REASSESSMENT NOTE - GENERAL PATIENT STATE
smiling/interactive
comfortable appearance/improvement verbalized/cooperative
comfortable appearance/pt is awake, calm at baseline mental status/cooperative

## 2019-03-29 NOTE — ED ADULT NURSE REASSESSMENT NOTE - REASSESS COMMUNICATION
ED physician notified/residence staff aware of d/c, d/c instructions provided
ED physician notified/residence staff at bedside. Will continue to monitor for safety.

## 2019-03-29 NOTE — ED ADULT NURSE REASSESSMENT NOTE - NS ED NURSE REASSESS COMMENT FT1
pt resting, NAD. Pending XR results and MC. When awake, pt denies worsening pain to left hand, no active bleeding, full ROM intact. Will continue to monitor for safety, residence staff remains with pt. PO meds administered as ordered.

## 2019-04-01 ENCOUNTER — EMERGENCY (EMERGENCY)
Facility: HOSPITAL | Age: 29
LOS: 1 days | Discharge: HOME | End: 2019-04-01
Attending: EMERGENCY MEDICINE | Admitting: EMERGENCY MEDICINE
Payer: MEDICAID

## 2019-04-01 VITALS
RESPIRATION RATE: 16 BRPM | HEART RATE: 54 BPM | SYSTOLIC BLOOD PRESSURE: 98 MMHG | DIASTOLIC BLOOD PRESSURE: 53 MMHG | OXYGEN SATURATION: 99 %

## 2019-04-01 VITALS
HEART RATE: 90 BPM | OXYGEN SATURATION: 99 % | TEMPERATURE: 98 F | DIASTOLIC BLOOD PRESSURE: 72 MMHG | RESPIRATION RATE: 20 BRPM | SYSTOLIC BLOOD PRESSURE: 121 MMHG

## 2019-04-01 PROCEDURE — 73130 X-RAY EXAM OF HAND: CPT | Mod: 26,LT

## 2019-04-01 PROCEDURE — 99284 EMERGENCY DEPT VISIT MOD MDM: CPT | Mod: 25
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
## 2019-04-01 NOTE — ED PROVIDER NOTE - CLINICAL SUMMARY MEDICAL DECISION MAKING FREE TEXT BOX
pt from group home with period of agitation.  Per staff pt is at baseline.  Here cooperative.  Hand does not require closure but will film due to edema.  Staff is not comfortable going back to home with just him (there will be no other guests or staff at the home).  As such will hold him in the ED with staff until either shift change at 0630 or sooner if supervision can be arranged

## 2019-04-01 NOTE — PROVIDER CONTACT NOTE (OTHER) - ACTION/TREATMENT ORDERED:
KEKE contacted [facility] to arrange transport,  Transport scheduled for  at 6:00 a.m. invoice # [id number]; trip # 240A.
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
## 2019-04-01 NOTE — PROVIDER CONTACT NOTE (OTHER) - SITUATION
Pt needs transportation back to his residence-confirmed with staff at bedside that pt is going to [facility] at [phone] [place] [address].

## 2019-04-01 NOTE — ED PROVIDER NOTE - PHYSICAL EXAMINATION
Gen: Well appearing in NAD  Head: NC/AT  Neck: trachea midline  Resp:  No distress  Ext: no deformities L hand is slightly swollen but full ROM  Neuro:  A&O appears non focal  Skin:  Warm and dry as visualized - except for a number of superficial lacerations to the dorsum of the hand with no active bleeding  Psych:  Normal mood with an odd affect

## 2019-04-01 NOTE — ED ADULT NURSE REASSESSMENT NOTE - NS ED NURSE REASSESS COMMENT FT1
pt resting comfortably in stretcher, in NAD, d/c with ambulance from senior care currently at bedside to  patient to return to group home.

## 2019-05-12 ENCOUNTER — EMERGENCY (EMERGENCY)
Facility: HOSPITAL | Age: 29
LOS: 1 days | Discharge: HOME | End: 2019-05-12
Admitting: EMERGENCY MEDICINE
Payer: MEDICAID

## 2019-05-12 VITALS
RESPIRATION RATE: 16 BRPM | HEART RATE: 82 BPM | TEMPERATURE: 98 F | OXYGEN SATURATION: 100 % | SYSTOLIC BLOOD PRESSURE: 142 MMHG | DIASTOLIC BLOOD PRESSURE: 82 MMHG

## 2019-05-12 PROCEDURE — 99283 EMERGENCY DEPT VISIT LOW MDM: CPT

## 2019-05-12 RX ORDER — CHLORPROMAZINE HCL 10 MG
50 TABLET ORAL ONCE
Refills: 0 | Status: COMPLETED | OUTPATIENT
Start: 2019-05-12 | End: 2019-05-12

## 2019-05-12 RX ORDER — BACITRACIN ZINC 500 UNIT/G
1 OINTMENT IN PACKET (EA) TOPICAL ONCE
Refills: 0 | Status: COMPLETED | OUTPATIENT
Start: 2019-05-12 | End: 2019-05-12

## 2019-05-12 RX ORDER — DIPHENHYDRAMINE HCL 50 MG
50 CAPSULE ORAL ONCE
Refills: 0 | Status: COMPLETED | OUTPATIENT
Start: 2019-05-12 | End: 2019-05-12

## 2019-05-12 RX ADMIN — Medication 50 MILLIGRAM(S): at 17:15

## 2019-05-12 RX ADMIN — Medication 50 MILLIGRAM(S): at 18:12

## 2019-05-12 RX ADMIN — Medication 1 APPLICATION(S): at 18:00

## 2019-05-12 NOTE — ED PROVIDER NOTE - CLINICAL SUMMARY MEDICAL DECISION MAKING FREE TEXT BOX
28 y/o M  hx Mood D/O , Intellectual Disability, Impulse Control D/O   Behaviour likely to impulse control disorder. Superficial abrasion over anterior surface of right hand . Tetanus up to date. Bacitracin applied. Medication offered. Discus plan with group home staff- Mr Lozada [phone] Medical evaluation performed. There is no clinical evidence of intoxication or any acute medical problem requiring immediate intervention.  D/C to group home via EMS.

## 2019-05-12 NOTE — ED PROVIDER NOTE - NSFOLLOWUPCLINICS_GEN_ALL_ED_FT
[facility]  Behavioral Health  [address] [place] [address]  Phone: [phone]  Fax:   Follow Up Time:

## 2019-05-12 NOTE — ED PROVIDER NOTE - OBJECTIVE STATEMENT
28 y/o M  hx Mood D/O , Intellectual Disability, Impulse Control D/O  BIBA  from [facility] c/o increase agitation and acting out behaviour .  Staff  states that patient was apparently  angry  with his mother and scratched himself on  the hand with a scissors.  Superficial Abrasion not over  right hand .  No evidence of deformities. Denies falling, punching or kicking any objects.  Denies SI/HI/AH/VH. Denies pain , SOB , fever, chill, chest/alcohol discomfort.  Denies recent use of  alcohol or illicit drugs.

## 2019-05-12 NOTE — ED ADULT NURSE REASSESSMENT NOTE - NS ED NURSE REASSESS COMMENT FT1
Patient in improved and stable condition, discharged as per NP Pellew order, discharge instructions given to staff from residence, pt verbalized understanding and left ER a&ox3 with EMS service.
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
## 2019-05-12 NOTE — ED ADULT NURSE NOTE - NSIMPLEMENTINTERV_GEN_ALL_ED
Implemented All Universal Safety Interventions:  [place] to call system. Call bell, personal items and telephone within reach. Instruct patient to call for assistance. Room bathroom lighting operational. Non-slip footwear when patient is off stretcher. Physically safe environment: no spills, clutter or unnecessary equipment. Stretcher in lowest position, wheels locked, appropriate side rails in place.

## 2019-05-17 ENCOUNTER — EMERGENCY (EMERGENCY)
Facility: HOSPITAL | Age: 29
LOS: 1 days | Discharge: HOME | End: 2019-05-17
Admitting: EMERGENCY MEDICINE
Payer: MEDICAID

## 2019-05-17 VITALS
RESPIRATION RATE: 14 BRPM | DIASTOLIC BLOOD PRESSURE: 84 MMHG | HEART RATE: 88 BPM | SYSTOLIC BLOOD PRESSURE: 138 MMHG | OXYGEN SATURATION: 100 % | TEMPERATURE: 98 F

## 2019-05-17 PROCEDURE — 99283 EMERGENCY DEPT VISIT LOW MDM: CPT | Mod: 25

## 2019-05-17 RX ORDER — DIPHENHYDRAMINE HCL 50 MG
50 CAPSULE ORAL ONCE
Refills: 0 | Status: COMPLETED | OUTPATIENT
Start: 2019-05-17 | End: 2019-05-17

## 2019-05-17 RX ORDER — CHLORPROMAZINE HCL 10 MG
50 TABLET ORAL ONCE
Refills: 0 | Status: COMPLETED | OUTPATIENT
Start: 2019-05-17 | End: 2019-05-17

## 2019-05-17 RX ADMIN — Medication 50 MILLIGRAM(S): at 23:30

## 2019-05-17 NOTE — ED PROVIDER NOTE - CLINICAL SUMMARY MEDICAL DECISION MAKING FREE TEXT BOX
28 y/o M  hx Mood D/O , Intellectual Disability, Impulse Control D/O  Medical evaluation performed. There is no clinical evidence of intoxication or any acute medical problem requiring immediate intervention.  D/C to group home via EMS. Discuss plan with Supervisor.
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
## 2019-05-17 NOTE — ED PROVIDER NOTE - CARE PLAN
Principal Discharge DX:	Impulse control disorder  Secondary Diagnosis:	Intellectual disability  Secondary Diagnosis:	Mood disorder
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
## 2019-05-18 RX ORDER — CHLORPROMAZINE HCL 10 MG
50 TABLET ORAL ONCE
Refills: 0 | Status: COMPLETED | OUTPATIENT
Start: 2019-05-18 | End: 2019-05-18

## 2019-05-18 RX ORDER — HALOPERIDOL DECANOATE 100 MG/ML
5 INJECTION INTRAMUSCULAR ONCE
Refills: 0 | Status: COMPLETED | OUTPATIENT
Start: 2019-05-18 | End: 2019-05-18

## 2019-05-18 RX ADMIN — Medication 50 MILLIGRAM(S): at 00:07

## 2019-05-18 RX ADMIN — HALOPERIDOL DECANOATE 5 MILLIGRAM(S): 100 INJECTION INTRAMUSCULAR at 00:08
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
## 2019-05-21 ENCOUNTER — EMERGENCY (EMERGENCY)
Facility: HOSPITAL | Age: 29
LOS: 1 days | Discharge: HOME | End: 2019-05-21
Attending: EMERGENCY MEDICINE | Admitting: EMERGENCY MEDICINE
Payer: MEDICAID

## 2019-05-21 VITALS
DIASTOLIC BLOOD PRESSURE: 77 MMHG | TEMPERATURE: 99 F | HEART RATE: 80 BPM | RESPIRATION RATE: 16 BRPM | SYSTOLIC BLOOD PRESSURE: 117 MMHG | OXYGEN SATURATION: 98 %

## 2019-05-21 PROCEDURE — 99282 EMERGENCY DEPT VISIT SF MDM: CPT
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
## 2019-05-21 NOTE — ED ADULT NURSE NOTE - NSIMPLEMENTINTERV_GEN_ALL_ED
Implemented All Universal Safety Interventions:  [place] to call system. Call bell, personal items and telephone within reach. Instruct patient to call for assistance. Room bathroom lighting operational. Non-slip footwear when patient is off stretcher. Physically safe environment: no spills, clutter or unnecessary equipment. Stretcher in lowest position, wheels locked, appropriate side rails in place.

## 2019-07-01 ENCOUNTER — EMERGENCY (EMERGENCY)
Facility: HOSPITAL | Age: 29
LOS: 1 days | Discharge: HOME | End: 2019-07-01
Attending: EMERGENCY MEDICINE | Admitting: EMERGENCY MEDICINE
Payer: MEDICAID

## 2019-07-01 VITALS
DIASTOLIC BLOOD PRESSURE: 73 MMHG | TEMPERATURE: 98 F | OXYGEN SATURATION: 100 % | RESPIRATION RATE: 16 BRPM | SYSTOLIC BLOOD PRESSURE: 119 MMHG | HEART RATE: 71 BPM

## 2019-07-01 PROCEDURE — 99283 EMERGENCY DEPT VISIT LOW MDM: CPT

## 2019-07-01 PROCEDURE — 90792 PSYCH DIAG EVAL W/MED SRVCS: CPT

## 2019-07-01 NOTE — ED ADULT NURSE NOTE - CHIEF COMPLAINT QUOTE
Pt. from [facility] with c/o aggressive behavior. trying to take medication out of staff bag. stating to staff "I want to kill myself" Pt. with hx of impulse control disorder. Denies hearing any voices. Pt. admits SI. Denies HI. Pt. escorted by [facility] in cuffs.

## 2019-07-01 NOTE — ED PROVIDER NOTE - OBJECTIVE STATEMENT
28 y/o M from group home [facility], who became verbally and physically aggressive after not being able to speak to his mother. Pt hasnot taken his 4PM or 7PM meds. Pt states he wants to hurt himself but denies wanting to hurt anyone else. Denies auditory hallucinations. No medical history. Normally compliant with meds.

## 2019-07-01 NOTE — ED ADULT TRIAGE NOTE - CHIEF COMPLAINT QUOTE
Pt. from [facility] with c/o aggressive behavior. trying to take medication out of staff bag. stating to staff "I want to kill myself" Pt. with hx of impulse control disorder. Denies hearing any voices. Pt. admits SI. Denies HI. Pt. escorted by [facility] in cuffs.

## 2019-07-01 NOTE — ED BEHAVIORAL HEALTH ASSESSMENT NOTE - SUMMARY
30yo  male, single, domiciled with peers at the [facility], PPH of mood disorder, intellectual disability and factitious disorder, 4 reported inpatient admissions (he cannot recall when last one was), no prior suicide attempts, no know history of substance abuse, brought in by EMS, activated by [facility] after patient became agitated and reported suicidal ideation.    Patient denies SI/HI/I/P as well as everett and psychosis. Patient's behavior is behavioral in nature due to chronic poor frustration tolerance, impulsivity and impaired delayed instant gratification related to his cognitive limitations and not related to an exacerbation of an axis I diagnosis. Patient is future oriented and able to safety plan. Patient is not seeking and refused inpatient admission. Patient does not meet criteria for involuntary inpatient admission. He will be discharged to group home and will follow up with outpatient provider. Extensive safety planning performed. Patient and staff agreeing verbally to return patient to ER or call 911 if symptoms worsen or patient has urges to harm self or others.

## 2019-07-01 NOTE — ED BEHAVIORAL HEALTH ASSESSMENT NOTE - RISK ASSESSMENT
low. risks include chronic poor frustration tolerance and impulsivity. Protective factors include no suicide attempts, no violence history, medication compliance, no access to guns, no global insomnia, no suicidal ideation/intent/plan, future oriented, no substance abuse, supportive family, able to safety plan, willingness to seek help, no homicidal ideation, hopefulness for future, no everett/psychosis/depression.

## 2019-07-01 NOTE — ED PROVIDER NOTE - CONSTITUTIONAL, MLM
normal... Well appearing, well nourished, awake, alert, oriented to person, place, time/situation and in no apparent distress. In handcuffs.

## 2019-07-01 NOTE — ED BEHAVIORAL HEALTH ASSESSMENT NOTE - HPI (INCLUDE ILLNESS QUALITY, SEVERITY, DURATION, TIMING, CONTEXT, MODIFYING FACTORS, ASSOCIATED SIGNS AND SYMPTOMS)
28yo  male, single, domiciled with peers at the [facility], PPH of mood disorder, intellectual disability and factitious disorder, 4 reported inpatient admissions (he cannot recall when last one was), no prior suicide attempts, no know history of substance abuse, brought in by EMS, activated by [facility] after patient became agitated and reported suicidal ideation.    Patient known to writer. He has a history of poor impulse control and provocatively reporting suicidal ideation when upset. Today he reports he was upset because he called his mother and a man picked up the phone. He reported being angry about this and reported suicidal ideation at the time, which he denies now that he is calm.  He denied intent to harm self or anyone else.  Patient denies any depressive symptoms including depressed mood, anhedonia, changes in energy/concentration/appetite, sleep disturbances, or feelings of guilt. Patient denies manic symptoms including elevated mood, increased irritability, mood lability, distractibility, grandiosity, pressured speech, increase in goal-directed activity, or decreased need for sleep. Patient denies any psychotic symptoms including paranoia, ideas of reference, thought insertion/broadcasting, or auditory/visual/olfactory/tactile/gustatory hallucinations. Denies substance use. Patient then requested discharge stating he wants to go home now that he feels better.     See  note for collateral information.

## 2019-07-01 NOTE — ED BEHAVIORAL HEALTH ASSESSMENT NOTE - DESCRIPTION
denied lives in a psychiatric residence, disabled During course of ED visit patient was calm and cooperative. Patient was not aggressive or violent.     ICU Vital Signs Last 24 Hrs  T(C): 36.6 (01 Jul 2019 21:12), Max: 36.6 (01 Jul 2019 21:12)  T(F): 97.8 (01 Jul 2019 21:12), Max: 97.8 (01 Jul 2019 21:12)  HR: 71 (01 Jul 2019 21:12) (71 - 71)  BP: 119/73 (01 Jul 2019 21:12) (119/73 - 119/73)  BP(mean): --  ABP: --  ABP(mean): --  RR: 16 (01 Jul 2019 21:12) (16 - 16)  SpO2: 100% (01 Jul 2019 21:12) (100% - 100%)

## 2019-07-01 NOTE — ED BEHAVIORAL HEALTH ASSESSMENT NOTE - SUICIDE PROTECTIVE FACTORS
Fear of death or dying due to pain/suffering/Responsibility to family and others/Positive therapeutic relationships/Engaged in work or school/Supportive social network or family/Identifies reasons for living/Future oriented

## 2019-07-01 NOTE — ED BEHAVIORAL HEALTH NOTE - BEHAVIORAL HEALTH NOTE
Collateral obtained by Bryce, on-call coordinator at [facility] ([phone]), states that pt has been closely observed and has been making significant progress in current outpatient treatment.  She reports that pt was triggered by calling mother and hearing voice of another male, inducing feelings of anger in pt, prompting him to call 911.  Collateral plans to perform a check-in once pt is brought back home, denies any acute safety concerns, agrees that pt is safe to be discharged home.

## 2019-07-01 NOTE — ED ADULT NURSE REASSESSMENT NOTE - GENERAL PATIENT STATE
cooperative/pt remains awake, calm at baseline mental status. Denies intent to harm self or others/comfortable appearance

## 2019-07-01 NOTE — ED BEHAVIORAL HEALTH ASSESSMENT NOTE - OTHER
concrete limited due to intellectual disability [facility] crisis worker peers male answering the phone when he called his mother

## 2019-11-25 ENCOUNTER — OUTPATIENT (OUTPATIENT)
Dept: OUTPATIENT SERVICES | Facility: HOSPITAL | Age: 29
LOS: 1 days | Discharge: HOME | End: 2019-11-25

## 2019-11-25 ENCOUNTER — APPOINTMENT (OUTPATIENT)
Dept: OTOLARYNGOLOGY | Facility: CLINIC | Age: 29
End: 2019-11-25
Payer: MEDICAID

## 2019-11-25 VITALS — BODY MASS INDEX: 31.5 KG/M2 | HEIGHT: 71 IN | WEIGHT: 225 LBS

## 2019-11-25 DIAGNOSIS — Z71.89 OTHER SPECIFIED COUNSELING: ICD-10-CM

## 2019-11-25 DIAGNOSIS — F32.9 ANXIETY DISORDER, UNSPECIFIED: ICD-10-CM

## 2019-11-25 DIAGNOSIS — H91.92 UNSPECIFIED HEARING LOSS, LEFT EAR: ICD-10-CM

## 2019-11-25 DIAGNOSIS — Z00.00 ENCOUNTER FOR GENERAL ADULT MEDICAL EXAMINATION W/OUT ABNORMAL FINDINGS: ICD-10-CM

## 2019-11-25 DIAGNOSIS — F41.9 ANXIETY DISORDER, UNSPECIFIED: ICD-10-CM

## 2019-11-25 DIAGNOSIS — Z86.59 PERSONAL HISTORY OF OTHER MENTAL AND BEHAVIORAL DISORDERS: ICD-10-CM

## 2019-11-25 DIAGNOSIS — H61.23 IMPACTED CERUMEN, BILATERAL: ICD-10-CM

## 2019-11-25 DIAGNOSIS — Z87.09 PERSONAL HISTORY OF OTHER DISEASES OF THE RESPIRATORY SYSTEM: ICD-10-CM

## 2019-11-25 PROCEDURE — 99203 OFFICE O/P NEW LOW 30 MIN: CPT | Mod: 25

## 2019-11-25 PROCEDURE — 69210 REMOVE IMPACTED EAR WAX UNI: CPT | Mod: 52

## 2019-11-25 NOTE — PROCEDURE
[Cerumen Impaction] : Cerumen Impaction [Same] : same as the Pre Op Dx. [] : Removal of Cerumen [FreeTextEntry6] : partial removal - uncooperative for complete

## 2019-11-25 NOTE — HISTORY OF PRESENT ILLNESS
[de-identified] : 29M w/ intellecutal disability presents for eval of hearing aid eval. pt moved into group home in Feb with a left sided hearing aid but the group home has no records. pt and aides deny otorrhea, otalgia, tinnitus, vertigo, recent ear infections. has not been using hearing aid because he says he can hear well.

## 2019-11-25 NOTE — PHYSICAL EXAM
[Binocular Microscopic Exam] : Binocular microscopic exam was performed [] : septum deviated to the right [Midline] : trachea located in midline position [Normal] : no rashes [FreeTextEntry8] : complete cerumen impaction [FreeTextEntry9] : complete cerumen impaction [de-identified] : unable to visualize  [de-identified] : unable to visualize [de-identified] : dry mucosa [de-identified] : poor dentition [de-identified] : 1+ [de-identified] : dry

## 2019-11-25 NOTE — REASON FOR VISIT
[Initial Evaluation] : an initial evaluation for [Formal Caregiver] : formal caregiver [FreeTextEntry2] : Initial visit for ear check up, Left hearing loss with unworn hearing aid, history of patient from group home, anxiety, depression, OCD and intellectual disability.

## 2019-11-26 DIAGNOSIS — H61.23 IMPACTED CERUMEN, BILATERAL: ICD-10-CM

## 2019-11-26 DIAGNOSIS — H91.92 UNSPECIFIED HEARING LOSS, LEFT EAR: ICD-10-CM

## 2020-01-27 ENCOUNTER — APPOINTMENT (OUTPATIENT)
Dept: OTOLARYNGOLOGY | Facility: CLINIC | Age: 30
End: 2020-01-27

## 2020-02-03 ENCOUNTER — EMERGENCY (EMERGENCY)
Facility: HOSPITAL | Age: 30
LOS: 1 days | Discharge: HOME | End: 2020-02-03
Attending: EMERGENCY MEDICINE
Payer: MEDICAID

## 2020-02-03 VITALS
DIASTOLIC BLOOD PRESSURE: 78 MMHG | HEART RATE: 66 BPM | OXYGEN SATURATION: 98 % | TEMPERATURE: 98 F | SYSTOLIC BLOOD PRESSURE: 128 MMHG | RESPIRATION RATE: 20 BRPM | HEIGHT: 71 IN | WEIGHT: 199.96 LBS

## 2020-02-03 VITALS
TEMPERATURE: 98 F | SYSTOLIC BLOOD PRESSURE: 111 MMHG | RESPIRATION RATE: 19 BRPM | DIASTOLIC BLOOD PRESSURE: 78 MMHG | OXYGEN SATURATION: 99 %

## 2020-02-03 PROCEDURE — 99283 EMERGENCY DEPT VISIT LOW MDM: CPT

## 2020-02-03 PROCEDURE — 99282 EMERGENCY DEPT VISIT SF MDM: CPT

## 2020-02-03 NOTE — ED PROVIDER NOTE - OBJECTIVE STATEMENT
29 year old male with PMHx of asthma, hyperthyroidism, GERD ,impulse control disorder, factitious disorder, moderate intellectual disability, and mitral valve disease and no pertinent PSHx presents to the ED from group home with complaints of abdominal pain. Patient additionally reports that he has been passing hard stools and has been having pain during defecating. Patient reportedly prescribed Colace, which patient refuses to take according to a group home staff member at the bedside. Patient is currently stating that he has no abdominal pain, as his pain has resolved. Patient otherwise denies any fever, vomiting, urinary symptoms, and all other acute complaints. Patient denies any suicidal or homicidal ideations, and per group home staff member patient has not been displaying any aggressive behavior.   Allergies: Zyprexa (dystonic reaction) 29 year old male with PMHx of asthma, hyperthyroidism, GERD ,impulse control disorder, factitious disorder, moderate intellectual disability, and mitral valve disease and no pertinent PSHx presents to the ED from group home with complaints of abdominal pain. Patient additionally reports that he has been passing hard stools and has been having pain during defecating. Patient reportedly prescribed Colace, which patient normally refuses to take according to a group home staff member at the bedside. Patient is currently stating that he has no abdominal pain, as his pain has resolved. Patient otherwise denies any fever, vomiting, urinary symptoms, and all other acute complaints. Patient denies any suicidal or homicidal ideations, and per group home staff member patient has not been displaying any aggressive behavior.   Allergies: Zyprexa (dystonic reaction)

## 2020-02-03 NOTE — ED PROVIDER NOTE - GASTROINTESTINAL, MLM
Patient reports dizziness and headaches since finding out she is pregnant on Wednesday       Shira Wilkinson RN  11/26/17 1147
Provider at bedside with ultrasound       Glenn Fisher RN  11/26/17 6682
Abdomen soft, non-tender, no guarding.

## 2020-02-03 NOTE — ED ADULT NURSE NOTE - CHPI ED NUR SYMPTOMS NEG
no tingling/no decreased eating/drinking/no nausea/no dizziness/no pain/no vomiting/no weakness/no fever/no chills

## 2020-02-03 NOTE — ED ADULT TRIAGE NOTE - CHIEF COMPLAINT QUOTE
Pt is from [facility] with compliant of abd pain Pt is from [facility] with compliant of abd pain

## 2020-02-03 NOTE — ED PROVIDER NOTE - PATIENT PORTAL LINK FT
You can access the FollowMyHealth Patient Portal offered by [facility] by registering at the following website: [email or web address]. By joining [employer]’s FollowMyHealth portal, you will also be able to view your health information using other applications (apps) compatible with our system.

## 2020-02-03 NOTE — ED PROVIDER NOTE - CLINICAL SUMMARY MEDICAL DECISION MAKING FREE TEXT BOX
28yo M with asthma, GERD, MID, impulse control/factitious disorder presents complaining of hard stools and abd pain now resolved. Reports he had BM earlier today and abd pain has now resolved after taking his meds prior to coming to ED. Group home staff member at bedside endorses to patient safety at group [place] and reports patient acting at his baseline. patient stable for discharge back to group home with careful return to ED precautions.

## 2020-02-03 NOTE — ED PROVIDER NOTE - NSFOLLOWUPINSTRUCTIONS_ED_ALL_ED_FT
Continue colace daily for constipation.  Return to ED if abdominal pain recurs, develops fevers or vomiting.

## 2020-02-03 NOTE — ED ADULT NURSE NOTE - NSIMPLEMENTINTERV_GEN_ALL_ED
Implemented All Universal Safety Interventions:  [place] to call system. Call bell, personal items and telephone within reach. Instruct patient to call for assistance. Room bathroom lighting operational. Non-slip footwear when patient is off stretcher. Physically safe environment: no spills, clutter or unnecessary equipment. Stretcher in lowest position, wheels locked, appropriate side rails in place.

## 2020-02-03 NOTE — ED PROVIDER NOTE - PMH
Asthma    Factitious disorder    GERD (gastroesophageal reflux disease)    Impulse control disorder    Intellectual disability    Mitral valve disease    Mood disorder

## 2020-02-24 PROBLEM — K21.9 GASTRO-ESOPHAGEAL REFLUX DISEASE WITHOUT ESOPHAGITIS: Chronic | Status: ACTIVE | Noted: 2020-02-04

## 2020-02-24 PROBLEM — J45.909 UNSPECIFIED ASTHMA, UNCOMPLICATED: Chronic | Status: ACTIVE | Noted: 2020-02-04

## 2020-02-24 PROBLEM — F68.10: Chronic | Status: ACTIVE | Noted: 2020-02-04

## 2020-03-23 ENCOUNTER — APPOINTMENT (OUTPATIENT)
Dept: OTOLARYNGOLOGY | Facility: CLINIC | Age: 30
End: 2020-03-23

## 2020-04-25 ENCOUNTER — EMERGENCY (EMERGENCY)
Facility: HOSPITAL | Age: 30
LOS: 1 days | Discharge: HOME | End: 2020-04-25
Attending: EMERGENCY MEDICINE | Admitting: EMERGENCY MEDICINE
Payer: MEDICAID

## 2020-04-25 VITALS
TEMPERATURE: 99 F | DIASTOLIC BLOOD PRESSURE: 42 MMHG | RESPIRATION RATE: 18 BRPM | HEART RATE: 88 BPM | OXYGEN SATURATION: 100 % | SYSTOLIC BLOOD PRESSURE: 100 MMHG

## 2020-04-25 PROCEDURE — 99283 EMERGENCY DEPT VISIT LOW MDM: CPT

## 2020-04-25 RX ORDER — ACETAMINOPHEN 500 MG
650 TABLET ORAL ONCE
Refills: 0 | Status: COMPLETED | OUTPATIENT
Start: 2020-04-25 | End: 2020-04-25

## 2020-04-25 RX ADMIN — Medication 650 MILLIGRAM(S): at 01:07

## 2020-04-25 RX ADMIN — Medication 0.5 MILLIGRAM(S): at 01:07

## 2020-04-25 NOTE — ED PROVIDER NOTE - MUSCULOSKELETAL, MLM
Spine appears normal, range of motion is not limited, no muscle or joint tenderness, R foot in brace, normal skin color and warm to touch.

## 2020-04-25 NOTE — ED ADULT TRIAGE NOTE - CHIEF COMPLAINT QUOTE
called ems by patient for suicidal ideation no plan. As per [facility] patient requested to be in handcuffs. Denies alcohol/drug use. Patient staying at [facility] Currently calm and cooperative. Hx developmental delay, mood disorder MD giles aware patient to be seen in

## 2020-04-25 NOTE — ED ADULT NURSE NOTE - OBJECTIVE STATEMENT
Pt received to [facility] accompanied by EMS. Pt presents cooperative, appears somewhat intellectually disabled; states he felt suicidal earlier because he felt lonely and wants to speak to his mother but she forbids him to call in the nighttime; pt then called 911. Pt arrives with R foot in ortho boot stating he slipped and fell yesterday, was seen at [facility] and was told he sustained a fx. Pt c/o pain to R foot and appears jittery. Pt received to [facility] accompanied by EMS. Pt presents cooperative, appears somewhat intellectually disabled; states he felt suicidal earlier because he felt lonely and wants to speak to his mother but she forbids him to call in the nighttime; pt then called 911. Pt arrives with R foot in ortho boot stating he slipped and fell yesterday, was seen at [facility] and was told he sustained a fx. Pt c/o pain to R foot and appears jittery. Pt denies SI/HI at present; pts belongings secured for safety.

## 2020-04-25 NOTE — ED PROVIDER NOTE - PATIENT PORTAL LINK FT
You can access the FollowMyHealth Patient Portal offered by [facility] by registering at the following website: [email or web address]. By joining [employer]’s FollowMyHealth portal, you will also be able to view your health information using other applications (apps) compatible with our system.

## 2020-04-25 NOTE — ED PROVIDER NOTE - OBJECTIVE STATEMENT
28 y/o M with h/o asthma, hyperthyroidism, GERD, impulse control disorder, factitious disorder, moderate intellectual disability from group home after activating EMS for suicidality.  Pt states that he called EMS tonight because he was upset at the staff at his group home.  They wanted him to return to his room and go to bed when he did not.  He states that he wanted to call his mom, but he is not allowed to call her in the middle of the night.  He denies any SI/HI, hallucinations.  Pt reports recent fall with right foot injury (is wearing a boot) and is c/o pain to his right foot.  Denies any new injuries or falls.  No drug or etoh use.    Collateral obtained from staff (Neda Wu [phone] option #[phone]) who reports that the patient got a hold of their staff phone and called 911 himself tonight.  She states that the patient is currently staying at the [facility] (rather than his usual group home) as he is a frequent ED patient and given the pandemic and his frequent exposure to the health care system has been in quarantine at their residence.  She has no safety concerns, just reports that the patient has been a little more irritable than usual in quarantine, as he is not allowed to leave his room.  No concerns for self harm or suicidality.

## 2020-04-25 NOTE — ED PROVIDER NOTE - CLINICAL SUMMARY MEDICAL DECISION MAKING FREE TEXT BOX
28 y/o M with intellectual delay from group home after self-activated 911 for suicidality.  Pt denies currently, staff has no safety concerns.  No e/o trauma or intoxication.  No indication for emergent psych eval.  Pt is in good behavioral control, requesting tylenol for foot pain and pm meds.  Stable for dc back to facility.

## 2020-04-30 ENCOUNTER — TRANSCRIPTION ENCOUNTER (OUTPATIENT)
Age: 30
End: 2020-04-30

## 2020-05-06 ENCOUNTER — EMERGENCY (EMERGENCY)
Facility: HOSPITAL | Age: 30
LOS: 1 days | Discharge: HOME | End: 2020-05-06
Attending: EMERGENCY MEDICINE | Admitting: EMERGENCY MEDICINE
Payer: MEDICAID

## 2020-05-06 VITALS
TEMPERATURE: 98 F | RESPIRATION RATE: 18 BRPM | DIASTOLIC BLOOD PRESSURE: 99 MMHG | OXYGEN SATURATION: 99 % | SYSTOLIC BLOOD PRESSURE: 139 MMHG | HEART RATE: 86 BPM

## 2020-05-06 VITALS
DIASTOLIC BLOOD PRESSURE: 103 MMHG | TEMPERATURE: 98 F | RESPIRATION RATE: 16 BRPM | OXYGEN SATURATION: 99 % | HEART RATE: 99 BPM | SYSTOLIC BLOOD PRESSURE: 149 MMHG

## 2020-05-06 LAB
ALBUMIN SERPL ELPH-MCNC: 4.6 G/DL — SIGNIFICANT CHANGE UP (ref 3.3–5)
ALP SERPL-CCNC: 94 U/L — SIGNIFICANT CHANGE UP (ref 40–120)
ALT FLD-CCNC: 17 U/L — SIGNIFICANT CHANGE UP (ref 4–41)
AMPHET UR-MCNC: NEGATIVE — SIGNIFICANT CHANGE UP
ANION GAP SERPL CALC-SCNC: 14 MMO/L — SIGNIFICANT CHANGE UP (ref 7–14)
APAP SERPL-MCNC: < 15 UG/ML — LOW (ref 15–25)
APPEARANCE UR: CLEAR — SIGNIFICANT CHANGE UP
AST SERPL-CCNC: 19 U/L — SIGNIFICANT CHANGE UP (ref 4–40)
BACTERIA # UR AUTO: SIGNIFICANT CHANGE UP
BARBITURATES UR SCN-MCNC: NEGATIVE — SIGNIFICANT CHANGE UP
BASOPHILS # BLD AUTO: 0.03 K/UL — SIGNIFICANT CHANGE UP (ref 0–0.2)
BASOPHILS NFR BLD AUTO: 0.6 % — SIGNIFICANT CHANGE UP (ref 0–2)
BENZODIAZ UR-MCNC: NEGATIVE — SIGNIFICANT CHANGE UP
BILIRUB SERPL-MCNC: 0.2 MG/DL — SIGNIFICANT CHANGE UP (ref 0.2–1.2)
BILIRUB UR-MCNC: NEGATIVE — SIGNIFICANT CHANGE UP
BLOOD UR QL VISUAL: NEGATIVE — SIGNIFICANT CHANGE UP
BUN SERPL-MCNC: 13 MG/DL — SIGNIFICANT CHANGE UP (ref 7–23)
CALCIUM SERPL-MCNC: 9.3 MG/DL — SIGNIFICANT CHANGE UP (ref 8.4–10.5)
CANNABINOIDS UR-MCNC: NEGATIVE — SIGNIFICANT CHANGE UP
CHLORIDE SERPL-SCNC: 105 MMOL/L — SIGNIFICANT CHANGE UP (ref 98–107)
CO2 SERPL-SCNC: 24 MMOL/L — SIGNIFICANT CHANGE UP (ref 22–31)
COCAINE METAB.OTHER UR-MCNC: NEGATIVE — SIGNIFICANT CHANGE UP
COLOR SPEC: YELLOW — SIGNIFICANT CHANGE UP
CREAT SERPL-MCNC: 0.79 MG/DL — SIGNIFICANT CHANGE UP (ref 0.5–1.3)
EOSINOPHIL # BLD AUTO: 0.09 K/UL — SIGNIFICANT CHANGE UP (ref 0–0.5)
EOSINOPHIL NFR BLD AUTO: 1.7 % — SIGNIFICANT CHANGE UP (ref 0–6)
EPI CELLS # UR: SIGNIFICANT CHANGE UP
ETHANOL BLD-MCNC: < 10 MG/DL — SIGNIFICANT CHANGE UP
GLUCOSE SERPL-MCNC: 83 MG/DL — SIGNIFICANT CHANGE UP (ref 70–99)
GLUCOSE UR-MCNC: NEGATIVE — SIGNIFICANT CHANGE UP
HCT VFR BLD CALC: 43.5 % — SIGNIFICANT CHANGE UP (ref 39–50)
HGB BLD-MCNC: 13.7 G/DL — SIGNIFICANT CHANGE UP (ref 13–17)
HYALINE CASTS # UR AUTO: NEGATIVE — SIGNIFICANT CHANGE UP
IMM GRANULOCYTES NFR BLD AUTO: 0.4 % — SIGNIFICANT CHANGE UP (ref 0–1.5)
KETONES UR-MCNC: NEGATIVE — SIGNIFICANT CHANGE UP
LEUKOCYTE ESTERASE UR-ACNC: NEGATIVE — SIGNIFICANT CHANGE UP
LYMPHOCYTES # BLD AUTO: 1.61 K/UL — SIGNIFICANT CHANGE UP (ref 1–3.3)
LYMPHOCYTES # BLD AUTO: 30 % — SIGNIFICANT CHANGE UP (ref 13–44)
MCHC RBC-ENTMCNC: 25.7 PG — LOW (ref 27–34)
MCHC RBC-ENTMCNC: 31.5 % — LOW (ref 32–36)
MCV RBC AUTO: 81.6 FL — SIGNIFICANT CHANGE UP (ref 80–100)
METHADONE UR-MCNC: NEGATIVE — SIGNIFICANT CHANGE UP
MONOCYTES # BLD AUTO: 0.35 K/UL — SIGNIFICANT CHANGE UP (ref 0–0.9)
MONOCYTES NFR BLD AUTO: 6.5 % — SIGNIFICANT CHANGE UP (ref 2–14)
NEUTROPHILS # BLD AUTO: 3.27 K/UL — SIGNIFICANT CHANGE UP (ref 1.8–7.4)
NEUTROPHILS NFR BLD AUTO: 60.8 % — SIGNIFICANT CHANGE UP (ref 43–77)
NITRITE UR-MCNC: NEGATIVE — SIGNIFICANT CHANGE UP
NRBC # FLD: 0 K/UL — SIGNIFICANT CHANGE UP (ref 0–0)
OPIATES UR-MCNC: NEGATIVE — SIGNIFICANT CHANGE UP
OXYCODONE UR-MCNC: NEGATIVE — SIGNIFICANT CHANGE UP
PCP UR-MCNC: NEGATIVE — SIGNIFICANT CHANGE UP
PH UR: 6.5 — SIGNIFICANT CHANGE UP (ref 5–8)
PLATELET # BLD AUTO: 279 K/UL — SIGNIFICANT CHANGE UP (ref 150–400)
PMV BLD: 11.1 FL — SIGNIFICANT CHANGE UP (ref 7–13)
POTASSIUM SERPL-MCNC: 4 MMOL/L — SIGNIFICANT CHANGE UP (ref 3.5–5.3)
POTASSIUM SERPL-SCNC: 4 MMOL/L — SIGNIFICANT CHANGE UP (ref 3.5–5.3)
PROT SERPL-MCNC: 7.1 G/DL — SIGNIFICANT CHANGE UP (ref 6–8.3)
PROT UR-MCNC: 30 — SIGNIFICANT CHANGE UP
RBC # BLD: 5.33 M/UL — SIGNIFICANT CHANGE UP (ref 4.2–5.8)
RBC # FLD: 14.6 % — HIGH (ref 10.3–14.5)
RBC CASTS # UR COMP ASSIST: SIGNIFICANT CHANGE UP (ref 0–?)
SALICYLATES SERPL-MCNC: < 5 MG/DL — LOW (ref 15–30)
SODIUM SERPL-SCNC: 143 MMOL/L — SIGNIFICANT CHANGE UP (ref 135–145)
SP GR SPEC: 1.04 — SIGNIFICANT CHANGE UP (ref 1–1.04)
SQUAMOUS # UR AUTO: SIGNIFICANT CHANGE UP
TSH SERPL-MCNC: 0.78 UIU/ML — SIGNIFICANT CHANGE UP (ref 0.27–4.2)
UROBILINOGEN FLD QL: HIGH
WBC # BLD: 5.37 K/UL — SIGNIFICANT CHANGE UP (ref 3.8–10.5)
WBC # FLD AUTO: 5.37 K/UL — SIGNIFICANT CHANGE UP (ref 3.8–10.5)
WBC UR QL: SIGNIFICANT CHANGE UP (ref 0–?)

## 2020-05-06 PROCEDURE — 73120 X-RAY EXAM OF HAND: CPT | Mod: 26,RT

## 2020-05-06 PROCEDURE — 99283 EMERGENCY DEPT VISIT LOW MDM: CPT
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
## 2020-05-06 NOTE — ED PROVIDER NOTE - CLINICAL SUMMARY MEDICAL DECISION MAKING FREE TEXT BOX
28 y/o M with multiple psychiatric diagnoses including daily reports of SI/AH per [facility] staff.  Symptoms unchanged this morning.  911 activated by bystander when requested by patient.  Noted injury to hand as described.  Labs sent.  Will check hand XR.  No indication for emergency psychiatric evaluation at this time as condition seems unchanged from baseline.  Will observe and re-assess.  If OK for discharge, will return to [facility] for continued monitoring and care.

## 2020-05-06 NOTE — ED PROVIDER NOTE - PHYSICAL EXAMINATION
ATTENDING PHYSICAL EXAM  GEN - NAD; answers questions appropriately, well appearing; A+O x3  HEAD - NC/AT; EYES/NOSE - PERRL, EOMI, mucous membranes moist, no discharge; THROAT: Oral cavity and pharynx normal. No inflammation, swelling, exudate, or lesions  NECK: Neck supple, non-tender without lymphadenopathy, no masses, no JVD  PULMONARY - CTA b/l, symmetric breath sounds  CARDIAC -s1s2, RRR, no M,R,G  ABDOMEN - +BS, ND, NT, soft, no guarding, no rebound, no masses   BACK - no CVA tenderness, No vertebral or paravertebral tenderness  EXTREMITIES - Right hand with tenderness of dorsal aspect over 4th/5th MC with loss of 4th/5th knuckles - no swelling or erythema or signs of acute injury.  right wrist normal.  Motor and sensory function fully intact.  Normal cap refill.  Left hand normal.  Noted multiple old lacs/abrasions on b/l upper extremities - none appear acute or infected.  SKIN - no rash or bruising   NEUROLOGIC - alert, CN 2-12 intact, no motor deficint, gait nl  PSYCH - insight and judgment nl, memory nl, affect nl, thought nl, denies SI/HI.  Denies AH/VH.

## 2020-05-06 NOTE — ED PROVIDER NOTE - PATIENT PORTAL LINK FT
You can access the FollowMyHealth Patient Portal offered by [facility] by registering at the following website: [email or web address]. By joining [employer]’s FollowMyHealth portal, you will also be able to view your health information using other applications (apps) compatible with our system.

## 2020-05-06 NOTE — ED BEHAVIORAL HEALTH NOTE - BEHAVIORAL HEALTH NOTE
Discharge Transportation:  Huddle Completed.  Writer called pt’s residence [facility]  spoke to NILTON who states patient will not return to them, but to [facility] [id number] [place] [address].  Writer spoke to Director Anneliese  who confirmed patient will return to above address.  Writer called EMS spoke to Pepe to arrange an ambulance and transferred call to nurse handling care.

## 2020-05-06 NOTE — ED PROVIDER NOTE - PROGRESS NOTE DETAILS
Labs and XR results reviewed.  Patient re-assessed. Feels well.  Denies SI/AH.  Requests Ace bandage for right hand which was placed.  Stable for discharge.  I spoke to - [phone] - [facility] Director Anneliese Woo, Director - request for our SW to arrange transportation and they will meet him there.
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
## 2020-05-06 NOTE — ED PROVIDER NOTE - NSFOLLOWUPINSTRUCTIONS_ED_ALL_ED_FT
Follow up with psychiatrist and PMD for further care.  Return to ER immediately for any suicidal ideation or further concern.  Continue all medications as previously prescribed.

## 2020-06-04 ENCOUNTER — EMERGENCY (EMERGENCY)
Facility: HOSPITAL | Age: 30
LOS: 1 days | Discharge: HOME | End: 2020-06-04
Attending: EMERGENCY MEDICINE | Admitting: EMERGENCY MEDICINE
Payer: MEDICAID

## 2020-06-04 VITALS
DIASTOLIC BLOOD PRESSURE: 73 MMHG | RESPIRATION RATE: 16 BRPM | HEART RATE: 63 BPM | TEMPERATURE: 98 F | SYSTOLIC BLOOD PRESSURE: 109 MMHG | OXYGEN SATURATION: 100 %

## 2020-06-04 VITALS
RESPIRATION RATE: 16 BRPM | TEMPERATURE: 98 F | OXYGEN SATURATION: 95 % | DIASTOLIC BLOOD PRESSURE: 119 MMHG | HEART RATE: 63 BPM | SYSTOLIC BLOOD PRESSURE: 156 MMHG

## 2020-06-04 LAB
AMPHET UR-MCNC: NEGATIVE — SIGNIFICANT CHANGE UP
ANION GAP SERPL CALC-SCNC: 13 MMO/L — SIGNIFICANT CHANGE UP (ref 7–14)
ANION GAP SERPL CALC-SCNC: 9 MMO/L — SIGNIFICANT CHANGE UP (ref 7–14)
APAP SERPL-MCNC: < 15 UG/ML — LOW (ref 15–25)
BARBITURATES UR SCN-MCNC: NEGATIVE — SIGNIFICANT CHANGE UP
BASOPHILS # BLD AUTO: 0.03 K/UL — SIGNIFICANT CHANGE UP (ref 0–0.2)
BASOPHILS NFR BLD AUTO: 0.4 % — SIGNIFICANT CHANGE UP (ref 0–2)
BENZODIAZ UR-MCNC: NEGATIVE — SIGNIFICANT CHANGE UP
BUN SERPL-MCNC: 6 MG/DL — LOW (ref 7–23)
BUN SERPL-MCNC: 7 MG/DL — SIGNIFICANT CHANGE UP (ref 7–23)
CALCIUM SERPL-MCNC: 8.2 MG/DL — LOW (ref 8.4–10.5)
CALCIUM SERPL-MCNC: 9 MG/DL — SIGNIFICANT CHANGE UP (ref 8.4–10.5)
CANNABINOIDS UR-MCNC: NEGATIVE — SIGNIFICANT CHANGE UP
CHLORIDE SERPL-SCNC: 88 MMOL/L — LOW (ref 98–107)
CHLORIDE SERPL-SCNC: 95 MMOL/L — LOW (ref 98–107)
CO2 SERPL-SCNC: 21 MMOL/L — LOW (ref 22–31)
CO2 SERPL-SCNC: 23 MMOL/L — SIGNIFICANT CHANGE UP (ref 22–31)
COCAINE METAB.OTHER UR-MCNC: NEGATIVE — SIGNIFICANT CHANGE UP
CREAT SERPL-MCNC: 0.65 MG/DL — SIGNIFICANT CHANGE UP (ref 0.5–1.3)
CREAT SERPL-MCNC: 0.74 MG/DL — SIGNIFICANT CHANGE UP (ref 0.5–1.3)
EOSINOPHIL # BLD AUTO: 0.15 K/UL — SIGNIFICANT CHANGE UP (ref 0–0.5)
EOSINOPHIL NFR BLD AUTO: 2.2 % — SIGNIFICANT CHANGE UP (ref 0–6)
ETHANOL BLD-MCNC: < 10 MG/DL — SIGNIFICANT CHANGE UP
GLUCOSE SERPL-MCNC: 92 MG/DL — SIGNIFICANT CHANGE UP (ref 70–99)
GLUCOSE SERPL-MCNC: 98 MG/DL — SIGNIFICANT CHANGE UP (ref 70–99)
HCT VFR BLD CALC: 40.8 % — SIGNIFICANT CHANGE UP (ref 39–50)
HGB BLD-MCNC: 12.5 G/DL — LOW (ref 13–17)
IMM GRANULOCYTES NFR BLD AUTO: 0.3 % — SIGNIFICANT CHANGE UP (ref 0–1.5)
LYMPHOCYTES # BLD AUTO: 2.25 K/UL — SIGNIFICANT CHANGE UP (ref 1–3.3)
LYMPHOCYTES # BLD AUTO: 33.4 % — SIGNIFICANT CHANGE UP (ref 13–44)
MAGNESIUM SERPL-MCNC: 1.6 MG/DL — SIGNIFICANT CHANGE UP (ref 1.6–2.6)
MAGNESIUM SERPL-MCNC: 1.7 MG/DL — SIGNIFICANT CHANGE UP (ref 1.6–2.6)
MCHC RBC-ENTMCNC: 24.2 PG — LOW (ref 27–34)
MCHC RBC-ENTMCNC: 30.6 % — LOW (ref 32–36)
MCV RBC AUTO: 79.1 FL — LOW (ref 80–100)
METHADONE UR-MCNC: NEGATIVE — SIGNIFICANT CHANGE UP
MONOCYTES # BLD AUTO: 0.42 K/UL — SIGNIFICANT CHANGE UP (ref 0–0.9)
MONOCYTES NFR BLD AUTO: 6.2 % — SIGNIFICANT CHANGE UP (ref 2–14)
NEUTROPHILS # BLD AUTO: 3.86 K/UL — SIGNIFICANT CHANGE UP (ref 1.8–7.4)
NEUTROPHILS NFR BLD AUTO: 57.5 % — SIGNIFICANT CHANGE UP (ref 43–77)
NRBC # FLD: 0 K/UL — SIGNIFICANT CHANGE UP (ref 0–0)
OPIATES UR-MCNC: NEGATIVE — SIGNIFICANT CHANGE UP
OXYCODONE UR-MCNC: NEGATIVE — SIGNIFICANT CHANGE UP
PCP UR-MCNC: NEGATIVE — SIGNIFICANT CHANGE UP
PHOSPHATE SERPL-MCNC: 3.5 MG/DL — SIGNIFICANT CHANGE UP (ref 2.5–4.5)
PHOSPHATE SERPL-MCNC: 3.8 MG/DL — SIGNIFICANT CHANGE UP (ref 2.5–4.5)
PLATELET # BLD AUTO: 249 K/UL — SIGNIFICANT CHANGE UP (ref 150–400)
PMV BLD: 11.1 FL — SIGNIFICANT CHANGE UP (ref 7–13)
POTASSIUM SERPL-MCNC: 3.5 MMOL/L — SIGNIFICANT CHANGE UP (ref 3.5–5.3)
POTASSIUM SERPL-MCNC: 3.9 MMOL/L — SIGNIFICANT CHANGE UP (ref 3.5–5.3)
POTASSIUM SERPL-SCNC: 3.5 MMOL/L — SIGNIFICANT CHANGE UP (ref 3.5–5.3)
POTASSIUM SERPL-SCNC: 3.9 MMOL/L — SIGNIFICANT CHANGE UP (ref 3.5–5.3)
RBC # BLD: 5.16 M/UL — SIGNIFICANT CHANGE UP (ref 4.2–5.8)
RBC # FLD: 13.6 % — SIGNIFICANT CHANGE UP (ref 10.3–14.5)
SALICYLATES SERPL-MCNC: < 5 MG/DL — LOW (ref 15–30)
SODIUM SERPL-SCNC: 122 MMOL/L — LOW (ref 135–145)
SODIUM SERPL-SCNC: 127 MMOL/L — LOW (ref 135–145)
TSH SERPL-MCNC: 3.27 UIU/ML — SIGNIFICANT CHANGE UP (ref 0.27–4.2)
WBC # BLD: 6.73 K/UL — SIGNIFICANT CHANGE UP (ref 3.8–10.5)
WBC # FLD AUTO: 6.73 K/UL — SIGNIFICANT CHANGE UP (ref 3.8–10.5)

## 2020-06-04 PROCEDURE — 99284 EMERGENCY DEPT VISIT MOD MDM: CPT

## 2020-06-04 PROCEDURE — 90792 PSYCH DIAG EVAL W/MED SRVCS: CPT

## 2020-06-04 RX ORDER — SODIUM CHLORIDE 9 MG/ML
1000 INJECTION INTRAMUSCULAR; INTRAVENOUS; SUBCUTANEOUS ONCE
Refills: 0 | Status: COMPLETED | OUTPATIENT
Start: 2020-06-04 | End: 2020-06-04

## 2020-06-04 RX ADMIN — SODIUM CHLORIDE 1000 MILLILITER(S): 9 INJECTION INTRAMUSCULAR; INTRAVENOUS; SUBCUTANEOUS at 04:15

## 2020-06-04 NOTE — ED PROVIDER NOTE - PROGRESS NOTE DETAILS
BRIGITTE ED ATTENDING- Na at 122 with no prior history of hyponatremia per our records.  ethanol level negative. HypoNa ?excess water intake. No neurological signs of symptoms at this time. Cleared by psych team from their perspective with no acute interventions recommended. Will give IVF then recheck BMP for Na. BRIGITTE ED ATTENDING- Na at 122 with no prior history of hyponatremia per our records.  ethanol level negative. HypoNa possible secondary to excess water intake. No neurological signs of symptoms at this time. Cleared by psych team from their perspective with no acute interventions recommended. Will give IVF then recheck BMP for Na. CIANO- Na improved to 127 after 1L NS. patient with unchanged exam, continues to be clinically sober and at his baseline. Able to tolerate PO intake. Will dc back to [facility] home

## 2020-06-04 NOTE — ED ADULT NURSE REASSESSMENT NOTE - NS ED NURSE REASSESS COMMENT FT1
Pt sleeping, arousalable to talk and touch. Pt sodium improved to 127 after 1L of NS. awaiting transportation for d/c safety measures in place. will continue to monitor.

## 2020-06-04 NOTE — ED BEHAVIORAL HEALTH ASSESSMENT NOTE - SAFETY PLAN ADDT'L DETAILS
- stable  - c/w home dose 50mg QD of losartan. Consider uptitrating meds further.   - c/w amlodipine 5mg QD  - c/w metoprolol 25 BID for rate control and BP  - continue to monitor  - Vitals Q4H Safety plan discussed with...
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
## 2020-06-04 NOTE — ED PROVIDER NOTE - NS ED ROS FT
Constitutional: No weakness or fatigue, no fevers or chills  Skin: No rash or pruritis  HEENT: No sore throat, no vision changes  Cardiovascular: No chest pain, no shortness of breath  Respiratory: No shortness of breath, no cough  Gastrointestinal: No abdominal pain, no nausea, no vomiting, no diarrhea, no constipation  Musculoskeletal: No joint pain  Genitourinary: No dysuria or hematuria  Neurological: No focal weakness or tingling

## 2020-06-04 NOTE — ED BEHAVIORAL HEALTH ASSESSMENT NOTE - OTHER
ELVER peers cognitive impairment Impulsivity misses his mother concrete limited due to intellectual disability staff

## 2020-06-04 NOTE — PROVIDER CONTACT NOTE (OTHER) - ASSESSMENT
Discussed d/c transportation with provider, who is in agreement with pt traveling back to residence via EMS transport.  SW contacted [facility] to schedule transport, invoice # [id number].  [facility] to transport pt back to residence at [address] [place] [address].  Verbal huddle complete.

## 2020-06-04 NOTE — ED PROVIDER NOTE - PHYSICAL EXAMINATION
General: Patient in no apparent distress, AAO x 3  Skin: Dry and intact  HEENT: Head with R eye periorbital ecchymosis, otherwise atraumatic. Oral mucosa moist. No facial ttp  Eyes: Conjunctiva normal. EOMI. R eye periorbital ecchymosis  Cardiac: Regular rhythm and rate. No pretibial edema b/l  Respiratory: Lungs clear b/l and symmetric. No respiratory distress. Able to speak in complete sentences.  Gastrointestinal: Abdomen soft, nondistended, nontender  Musculoskeletal: Moves all extremities spontaneously  Neurological: alert and oriented to person, place, and time. nonataxic gait  Psychiatric: Cooperative

## 2020-06-04 NOTE — ED BEHAVIORAL HEALTH ASSESSMENT NOTE - SUICIDE PROTECTIVE FACTORS
Identifies reasons for living/Engaged in work or school/Responsibility to family and others/Supportive social network of family or friends/Fear of death or the actual act of killing self/Positive therapeutic relationships/Has future plans

## 2020-06-04 NOTE — ED PROVIDER NOTE - PATIENT PORTAL LINK FT
You can access the FollowMyHealth Patient Portal offered by [facility] by registering at the following website: [email or web address]. By joining [employer]’s FollowMyHealth portal, you will also be able to view your health information using other applications (apps) compatible with our system.

## 2020-06-04 NOTE — ED BEHAVIORAL HEALTH ASSESSMENT NOTE - SUMMARY
29yo  male, single, domiciled with peers at [facility] in [facility]  PPH of mood disorder, intellectual disability and factitious disorder, 4 reported inpatient admissions (he cannot recall when last one was), no prior suicide attempts, no know history of substance abuse, HX of SIB (Scratching) brought in by EMS, activated by the patient.     Patient reports that he has been in his residence for a year and he does not like the place. "they are rude; they curse" He states that he wanted to go somewhere, did not want to stay in his residence ; this morning he was in [facility] c/o dizziness and when he was d/yasmin back he called 911 and came here for psych evaluation this time. Patient admits that he just wants to stay here overnight. ; he says that he missed his family; He wants to be with his mother "all the time; but she takes me home on week ends sometimes" He denied intent to harm self or anyone else.  Patient denies any depressive symptoms including depressed mood, anhedonia, changes in energy/concentration/appetite, sleep disturbances, or feelings of guilt. Patient denies manic symptoms including elevated mood, increased irritability, mood lability, distractibility, grandiosity, pressured speech, increase in goal-directed activity, or decreased need for sleep. Patient denies any psychotic symptoms including paranoia, ideas of reference, thought insertion/broadcasting, or auditory/visual/olfactory/tactile/gustatory hallucinations. patient stated earlier that he had some beer. patient wants to go to bed "now"    See  note for collateral information. No safety concerns
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
## 2020-06-04 NOTE — ED ADULT NURSE REASSESSMENT NOTE - NS ED NURSE REASSESS COMMENT FT1
Pt received from  for NS Bolus due to hyponatremia. Pt sleepy but accusable. 20G IV placed in right AC. will repeat labs. Pt bradycardic on arrival, MD Luther aware. will continue to monitor. safety measures in place.

## 2020-06-04 NOTE — ED BEHAVIORAL HEALTH ASSESSMENT NOTE - RISK ASSESSMENT
low. risks include chronic poor frustration tolerance and impulsivity. Protective factors include no suicide attempts, no violence history, medication compliance, no access to guns, no global insomnia, no suicidal ideation/intent/plan, future oriented, no substance abuse, supportive family, able to safety plan, willingness to seek help, no homicidal ideation, hopefulness for future, no everett/psychosis/depression. Low Acute Suicide Risk

## 2020-06-04 NOTE — ED ADULT TRIAGE NOTE - CHIEF COMPLAINT QUOTE
alert from group home non compliant with meds   was being aggressive with staff  hx bipolar schizophrenia HTN asthma intellectual disability  ecchymosis to right eye   evaluated by Dr Luther sent to
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
## 2020-06-04 NOTE — ED ADULT NURSE REASSESSMENT NOTE - NS ED NURSE REASSESS COMMENT FT1
pt to be moved to main ED for fluid bolus ,low sodium, EKG to be done in main report given to ANALI De La Rosa left  calm and cooperative

## 2020-06-04 NOTE — PROVIDER CONTACT NOTE (OTHER) - BACKGROUND
Spoke with Deanna at [facility] [phone] to confirm that pt can still return to the residence.  As per Deanna, pt can return and safest way for pt to travel is to return by ambulance.
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
## 2020-06-04 NOTE — ED BEHAVIORAL HEALTH ASSESSMENT NOTE - DESCRIPTION
Vital Signs Last 24 Hrs  T(C): 36.7 (04 Jun 2020 00:35), Max: 36.7 (04 Jun 2020 00:35)  T(F): 98.1 (04 Jun 2020 00:35), Max: 98.1 (04 Jun 2020 00:35)  HR: 63 (04 Jun 2020 00:35) (63 - 63)  BP: 156/119 (04 Jun 2020 00:35) (156/119 - 156/119)  BP(mean): --  RR: 16 (04 Jun 2020 00:35) (16 - 16)  SpO2: 95% (04 Jun 2020 00:35) (95% - 95%) denied lives in a psychiatric residence, disabled

## 2020-06-04 NOTE — ED PROVIDER NOTE - NSFOLLOWUPINSTRUCTIONS_ED_ALL_ED_FT
You were seen in the Emergency Department for dizziness and agitation by the Emergency Medicine and Psychiatry teams.    You were deemed stable to be discharged to go home    Please stay hydrated at home and eat a normal diet to keep your sodium levels within normal limits.    Please return to the Emergency Room if your symptoms change or worsen

## 2020-06-04 NOTE — ED BEHAVIORAL HEALTH ASSESSMENT NOTE - PAST PSYCHOTROPIC MEDICATION
zyprexa; divalproex ; haloperidol ; benztropine  docusate 100mg po TID, clonidine 0.1mg po TID; omeprazole 20mg po qAM; simvastatin 5mg po daily; hydroxy hcl 25mg po BID; levothyroxine 50mcg po daily; vitamin d3 1000IU po daily

## 2020-06-04 NOTE — ED BEHAVIORAL HEALTH ASSESSMENT NOTE - HPI (INCLUDE ILLNESS QUALITY, SEVERITY, DURATION, TIMING, CONTEXT, MODIFYING FACTORS, ASSOCIATED SIGNS AND SYMPTOMS)
31yo  male, single, domiciled with peers at [facility] in [facility]  PPH of mood disorder, intellectual disability and factitious disorder, 4 reported inpatient admissions (he cannot recall when last one was), no prior suicide attempts, no know history of substance abuse, HX of SIB (Scratching) brought in by EMS, activated by the patient.     Patient reports that he has been in his residence for a year and he does not like the place. "they are rude; they curse" He states that he wanted to go somewhere, did not want to stay in his residence ; this morning he was in [facility] c/o dizziness and when he was d/yasmin back he called 911 and came here for psych evaluation this time. Patient admits that he just wants to stay here overnight. ; he says that he missed his family; He wants to be with his mother "all the time; but she takes me home on week ends sometimes" He denied intent to harm self or anyone else.  Patient denies any depressive symptoms including depressed mood, anhedonia, changes in energy/concentration/appetite, sleep disturbances, or feelings of guilt. Patient denies manic symptoms including elevated mood, increased irritability, mood lability, distractibility, grandiosity, pressured speech, increase in goal-directed activity, or decreased need for sleep. Patient denies any psychotic symptoms including paranoia, ideas of reference, thought insertion/broadcasting, or auditory/visual/olfactory/tactile/gustatory hallucinations. patient stated earlier that he had some beer. patient wants to go to bed "now"    See  note for collateral information.

## 2020-06-20 ENCOUNTER — EMERGENCY (EMERGENCY)
Facility: HOSPITAL | Age: 30
LOS: 1 days | Discharge: HOME | End: 2020-06-20
Attending: EMERGENCY MEDICINE | Admitting: EMERGENCY MEDICINE
Payer: MEDICAID

## 2020-06-20 VITALS
TEMPERATURE: 98 F | SYSTOLIC BLOOD PRESSURE: 129 MMHG | DIASTOLIC BLOOD PRESSURE: 86 MMHG | HEART RATE: 90 BPM | RESPIRATION RATE: 16 BRPM | OXYGEN SATURATION: 100 %

## 2020-06-20 PROCEDURE — 99283 EMERGENCY DEPT VISIT LOW MDM: CPT

## 2020-06-20 PROCEDURE — 73130 X-RAY EXAM OF HAND: CPT | Mod: 26,RT

## 2020-06-20 RX ORDER — ACETAMINOPHEN 500 MG
650 TABLET ORAL ONCE
Refills: 0 | Status: COMPLETED | OUTPATIENT
Start: 2020-06-20 | End: 2020-06-20

## 2020-06-20 RX ORDER — IBUPROFEN 200 MG
400 TABLET ORAL ONCE
Refills: 0 | Status: COMPLETED | OUTPATIENT
Start: 2020-06-20 | End: 2020-06-20

## 2020-06-20 RX ADMIN — Medication 650 MILLIGRAM(S): at 01:51

## 2020-06-20 RX ADMIN — Medication 400 MILLIGRAM(S): at 01:52

## 2020-06-20 NOTE — ED PROVIDER NOTE - MUSCULOSKELETAL, MLM
Spine appears normal, range of motion is not limited, no muscle or joint tenderness. Right lateral hand without gross deformities, no open wounds

## 2020-06-20 NOTE — PROVIDER CONTACT NOTE (OTHER) - BACKGROUND
KEKE contacted pt group home, [facility] and spoke to EMILIE Carney ([phone]). Angelika informed KEKE pt is staying at [facility] [place] and provided contact info.

## 2020-06-20 NOTE — ED ADULT TRIAGE NOTE - AS TEMP SITE
Labor  epidural    Anesthesia Post Evaluation        Patient location during evaluation: bedside  Patient participation: complete - patient participated  Level of consciousness: awake and alert  Pain management: adequate  Airway patency: patent  Anesthetic complications: no  Cardiovascular status: acceptable  Respiratory status: acceptable  Hydration status: acceptable  Comments: Seen, no complaints  Post anesthesia nausea and vomiting:  none      Vitals Value Taken Time   /82 1/14/2020  7:16 PM   Temp     Pulse 91 1/14/2020  7:16 PM   Resp     SpO2     Vitals shown include unvalidated device data.
oral

## 2020-06-20 NOTE — PROVIDER CONTACT NOTE (OTHER) - RECOMMENDATIONS
Per provider, Kwabena pt is ready to d/c. KEKE received [employer] auth online [id number]. KEKE contacted [facility] ([phone]) to arrange for ambulance and spoke to Leonila, trip ID 124A with a 4:30AM p/u.

## 2020-06-20 NOTE — ED PROVIDER NOTE - OBJECTIVE STATEMENT
29 yo M RHD with Past Medical History of Bipolar, intellectual disability, asthma, and GERD that was brought in by EMS from group home for injuring right hand while washing tonight. pt was washing and reports hit pinky on sink and felt pain. No open wounds, no bleeding reported. no other injuries. Denies smoking, drinking, drugs. 29 yo M RHD with Past Medical History of Bipolar, intellectual disability, asthma, and GERD that was brought in by EMS from group home for injuring right hand while washing tonight. pt was washing and reports hit pinky on sink and felt pain. No open wounds, no bleeding reported. no other injuries. Denies smoking, drinking, drugs.  Spoke to Roseline at [phone] at the pt group home and she reports pt was sent to [facility] for SI and reporting he wanted to hurt himself. She also told this writer that pt was just discharged from [facility] for same issue and this happens frequently because he is always reporting he wants to hurt himself. She also states that pt will be sent back to [facility] if he is discharged from [facility] tonight. 29 yo M RHD with Past Medical History of Bipolar, intellectual disability, asthma, and GERD that was brought in by EMS from group home for injuring right hand while washing tonight. pt was washing and reports hit pinky on sink and felt pain. No open wounds, no bleeding reported. no other injuries. Denies smoking, drinking, drugs.  Spoke to Roseline at [phone] at the pt group home and she reports pt was sent to [facility] for SI and reporting he wanted to hurt himself. She also told this writer that pt was just discharged from [facility] for same issue and this happens frequently because he is always reporting he wants to hurt himself. Pt was discharged from [facility] and immediately started saying he wanted to hurt himself once he arrived at  so they called 911 again to have pt re-evaluated. She also states that pt will be sent back to [facility] if he is discharged from [facility].

## 2020-06-20 NOTE — ED PROVIDER NOTE - NSFOLLOWUPINSTRUCTIONS_ED_ALL_ED_FT
Please follow up with your primary care doctor after you leave the emergency department so that they can follow up and conduct more testing and treatment as they deem necessary. If you have worsening signs or symptoms of what you came in to the Emergency Department today and are not able to see your doctor, go to your nearest emergency department or return to the [facility] emergency department for further care and management. Home

## 2020-06-20 NOTE — PROVIDER CONTACT NOTE (OTHER) - ASSESSMENT
KEKE contacted by [facility] DSP staff - Ms. Cindy Borrero [phone] who states she spoke with Overnight KEKE Owusu and with MD Yuan prior to Pt’s return to [facility] this morning. KEKE reviewed chart notes which confirm MD communicated with Pt’s .  Ms. Borrero informs she is the worker with the Pt all weekend, Pt did not return with D/C instructions and requests D/C instructions sent to [facility] for Pt care.  She reports Pt’s residence is [facility]: [address] [place] [address], Pt is currently living at [facility] at [address] [place] [address] due to issues with other residents.  KEKE contacted [facility] Supervisor Ms. Gupta [phone] unavailable until Monday, DSP on-site at the [facility] confirms this is Pt’s residence but Pt is at [facility] for 2 weeks.  KEKE sent D/C instructions to [facility].  No further  intervention required at this time.

## 2020-06-20 NOTE — ED ADULT TRIAGE NOTE - CHIEF COMPLAINT QUOTE
pt arrives in handcuffs picked up from home for injuring R hand tonight while washing his hands, R hand does not appear swollen- no lacerations pt arrives in handcuffs because he wanted to be in handcuffs? picked up from home for injuring R hand tonight while washing his hands, R hand does not appear swollen- no lacerations. Pt calm, cooperative.

## 2020-06-20 NOTE — ED PROVIDER NOTE - CARDIAC, MLM
Normal rate, regular rhythm.  Heart sounds S1, S2.  No murmurs, rubs or gallops. pulses palapble and equal x 4.

## 2020-06-20 NOTE — ED PROVIDER NOTE - PATIENT PORTAL LINK FT
You can access the FollowMyHealth Patient Portal offered by [facility] by registering at the following website: [email or web address]. By joining [employer]’s FollowMyHealth portal, you will also be able to view your health information using other applications (apps) compatible with our system.

## 2020-06-20 NOTE — ED ADULT NURSE NOTE - CHIEF COMPLAINT QUOTE
pt arrives in handcuffs because he wanted to be in handcuffs? picked up from home for injuring R hand tonight while washing his hands, R hand does not appear swollen- no lacerations. Pt calm, cooperative.

## 2020-06-20 NOTE — PROVIDER CONTACT NOTE (OTHER) - ASSESSMENT
KEKE contacted Kate ([phone]) to discuss pts mode of transport once d/c'ed. Kate informed KEKE pt is at respite for the weekend at [address] [facility], [place] [address]. Pt will need an ambulance. Kate stated pt was expressing SI and trying to obtain sharp objects. Pt was at [facility] yesterday for hand pain. Kate stated the pt can become aggressive and if d/c'ed she will most likely have to activate 911 again. KEKE informed MD Yuan of above and provided contact info for Kate. More to follow.

## 2020-06-20 NOTE — ED PROVIDER NOTE - CLINICAL SUMMARY MEDICAL DECISION MAKING FREE TEXT BOX
29 yo M with multiple medical problems that is coming from group home for right hand pain s/p minor trauma. Appears well. XR obtained, no Fx seen. No other abnormalities on physical exam to indicate need for more imaging or labs. Will provide pain meds, wrap with ACE wrap and discharge back to group home with help of SW. Pt amenable to plan. 29 yo M with multiple medical problems that is coming from group home for right hand pain s/p minor trauma. Appears well. XR obtained, no Fx seen. No other abnormalities on physical exam to indicate need for more imaging or labs. Will provide pain meds, wrap with ACE wrap and discharge back to group home with help of SW. In terms of pt group home complaint of pt reporting he was SI and threatning to kill himself. pt is calm and cooperative here in ED, denies SI and trying to hurt himself. Reports he only says these things because staff at group home make him mad. Explained that despite people making him mad he should not threaten to hurt himself as this is not a good way to cope. Pt reports understanding and is amenable to plan to be discharged back to Group Home.

## 2020-06-24 ENCOUNTER — EMERGENCY (EMERGENCY)
Facility: HOSPITAL | Age: 30
LOS: 1 days | Discharge: HOME | End: 2020-06-24
Attending: EMERGENCY MEDICINE
Payer: MEDICAID

## 2020-06-24 VITALS
HEART RATE: 91 BPM | SYSTOLIC BLOOD PRESSURE: 117 MMHG | DIASTOLIC BLOOD PRESSURE: 76 MMHG | TEMPERATURE: 97 F | RESPIRATION RATE: 18 BRPM | OXYGEN SATURATION: 98 % | WEIGHT: 207.01 LBS

## 2020-06-24 LAB
APPEARANCE UR: CLEAR — SIGNIFICANT CHANGE UP
BILIRUB UR-MCNC: NEGATIVE — SIGNIFICANT CHANGE UP
COLOR SPEC: YELLOW — SIGNIFICANT CHANGE UP
DIFF PNL FLD: NEGATIVE — SIGNIFICANT CHANGE UP
GLUCOSE UR QL: NEGATIVE — SIGNIFICANT CHANGE UP
KETONES UR-MCNC: NEGATIVE — SIGNIFICANT CHANGE UP
LEUKOCYTE ESTERASE UR-ACNC: NEGATIVE — SIGNIFICANT CHANGE UP
NITRITE UR-MCNC: NEGATIVE — SIGNIFICANT CHANGE UP
PH UR: 8 — SIGNIFICANT CHANGE UP (ref 5–8)
PROT UR-MCNC: NEGATIVE — SIGNIFICANT CHANGE UP
SP GR SPEC: 1.01 — SIGNIFICANT CHANGE UP (ref 1.01–1.02)
UROBILINOGEN FLD QL: 1

## 2020-06-24 PROCEDURE — 99283 EMERGENCY DEPT VISIT LOW MDM: CPT

## 2020-06-24 NOTE — ED PROVIDER NOTE - OBJECTIVE STATEMENT
30 yr old male from group home with hx of asthma, hyperthyroid, gerd, moderate MR presents to ed c/o blood in urine after masturbating with condom today.  states tip of penis burns and happen 2 month ago.  no fever, no chills, no trauma, no n/v, no swelling.  no sexual intercourse or purulent discharge.

## 2020-06-24 NOTE — ED PROVIDER NOTE - CLINICAL SUMMARY MEDICAL DECISION MAKING FREE TEXT BOX
30 yr old male from group home with hx of asthma, hyperthyroid, gerd, moderate MR presents to ed c/o blood in urine after masturbating with condom today.  states tip of penis burns and happen 2 month ago.  no fever, no chills, no trauma, no n/v, no swelling.  no sexual intercourse or purulent discharge.    pt with hematuria post masturbation.  no red flags.  will check ua and likely dc if no uti.  blood likely from masturbating.

## 2020-06-24 NOTE — ED ADULT NURSE NOTE - OBJECTIVE STATEMENT
AOX4 +ambulatory patient from group home hx MR complaining of blood in the urine after masturbating with a condom this morning. Patient denies any other complaints.

## 2020-06-24 NOTE — ED PROVIDER NOTE - PATIENT PORTAL LINK FT
You can access the FollowMyHealth Patient Portal offered by [facility] by registering at the following website: [email or web address]. By joining [employer]’s FollowMyHealth portal, you will also be able to view your health information using other applications (apps) compatible with our system.

## 2020-06-24 NOTE — ED PROVIDER NOTE - PROGRESS NOTE DETAILS
rowley: pain improve. ua no blood noted.  dx penile irritation from masturbation. instruct to avoid. will get cab to assisted living.

## 2020-06-24 NOTE — ED ADULT NURSE NOTE - NSIMPLEMENTINTERV_GEN_ALL_ED
Implemented All Universal Safety Interventions:  [place] to call system. Call bell, personal items and telephone within reach. Instruct patient to call for assistance. Room bathroom lighting operational. Non-slip footwear when patient is off stretcher. Physically safe environment: no spills, clutter or unnecessary equipment. Stretcher in lowest position, wheels locked, appropriate side rails in place.

## 2020-06-25 LAB
CULTURE RESULTS: SIGNIFICANT CHANGE UP
SPECIMEN SOURCE: SIGNIFICANT CHANGE UP

## 2020-06-26 ENCOUNTER — INPATIENT (INPATIENT)
Facility: HOSPITAL | Age: 30
LOS: 3 days | Discharge: NURSING, REHAB OR LONG-TERM CARE | DRG: 726 | End: 2020-06-30
Attending: INTERNAL MEDICINE | Admitting: INTERNAL MEDICINE
Payer: MEDICAID

## 2020-06-26 VITALS
WEIGHT: 160.06 LBS | DIASTOLIC BLOOD PRESSURE: 63 MMHG | HEIGHT: 68 IN | OXYGEN SATURATION: 97 % | HEART RATE: 98 BPM | SYSTOLIC BLOOD PRESSURE: 121 MMHG | RESPIRATION RATE: 17 BRPM | TEMPERATURE: 98 F

## 2020-06-26 DIAGNOSIS — R33.9 RETENTION OF URINE, UNSPECIFIED: ICD-10-CM

## 2020-06-26 DIAGNOSIS — Z29.9 ENCOUNTER FOR PROPHYLACTIC MEASURES, UNSPECIFIED: ICD-10-CM

## 2020-06-26 DIAGNOSIS — F39 UNSPECIFIED MOOD [AFFECTIVE] DISORDER: ICD-10-CM

## 2020-06-26 LAB
ALBUMIN SERPL ELPH-MCNC: 3.9 G/DL — SIGNIFICANT CHANGE UP (ref 3.5–5)
ALP SERPL-CCNC: 114 U/L — SIGNIFICANT CHANGE UP (ref 40–120)
ALT FLD-CCNC: 24 U/L DA — SIGNIFICANT CHANGE UP (ref 10–60)
ANION GAP SERPL CALC-SCNC: 8 MMOL/L — SIGNIFICANT CHANGE UP (ref 5–17)
APPEARANCE UR: CLEAR — SIGNIFICANT CHANGE UP
AST SERPL-CCNC: 14 U/L — SIGNIFICANT CHANGE UP (ref 10–40)
BACTERIA # UR AUTO: ABNORMAL /HPF
BASOPHILS # BLD AUTO: 0.01 K/UL — SIGNIFICANT CHANGE UP (ref 0–0.2)
BASOPHILS NFR BLD AUTO: 0.1 % — SIGNIFICANT CHANGE UP (ref 0–2)
BILIRUB SERPL-MCNC: 0.2 MG/DL — SIGNIFICANT CHANGE UP (ref 0.2–1.2)
BILIRUB UR-MCNC: NEGATIVE — SIGNIFICANT CHANGE UP
BUN SERPL-MCNC: 12 MG/DL — SIGNIFICANT CHANGE UP (ref 7–18)
CALCIUM SERPL-MCNC: 8.8 MG/DL — SIGNIFICANT CHANGE UP (ref 8.4–10.5)
CHLORIDE SERPL-SCNC: 104 MMOL/L — SIGNIFICANT CHANGE UP (ref 96–108)
CO2 SERPL-SCNC: 25 MMOL/L — SIGNIFICANT CHANGE UP (ref 22–31)
COLOR SPEC: YELLOW — SIGNIFICANT CHANGE UP
CREAT SERPL-MCNC: 0.93 MG/DL — SIGNIFICANT CHANGE UP (ref 0.5–1.3)
DIFF PNL FLD: ABNORMAL
EOSINOPHIL # BLD AUTO: 0.07 K/UL — SIGNIFICANT CHANGE UP (ref 0–0.5)
EOSINOPHIL NFR BLD AUTO: 1 % — SIGNIFICANT CHANGE UP (ref 0–6)
EPI CELLS # UR: SIGNIFICANT CHANGE UP /HPF
GLUCOSE SERPL-MCNC: 86 MG/DL — SIGNIFICANT CHANGE UP (ref 70–99)
GLUCOSE UR QL: NEGATIVE — SIGNIFICANT CHANGE UP
HCT VFR BLD CALC: 41.3 % — SIGNIFICANT CHANGE UP (ref 39–50)
HGB BLD-MCNC: 13.2 G/DL — SIGNIFICANT CHANGE UP (ref 13–17)
HIV 1 & 2 AB SERPL IA.RAPID: SIGNIFICANT CHANGE UP
IMM GRANULOCYTES NFR BLD AUTO: 0.1 % — SIGNIFICANT CHANGE UP (ref 0–1.5)
KETONES UR-MCNC: NEGATIVE — SIGNIFICANT CHANGE UP
LEUKOCYTE ESTERASE UR-ACNC: NEGATIVE — SIGNIFICANT CHANGE UP
LYMPHOCYTES # BLD AUTO: 1.61 K/UL — SIGNIFICANT CHANGE UP (ref 1–3.3)
LYMPHOCYTES # BLD AUTO: 22.8 % — SIGNIFICANT CHANGE UP (ref 13–44)
MCHC RBC-ENTMCNC: 24 PG — LOW (ref 27–34)
MCHC RBC-ENTMCNC: 32 GM/DL — SIGNIFICANT CHANGE UP (ref 32–36)
MCV RBC AUTO: 75.2 FL — LOW (ref 80–100)
MONOCYTES # BLD AUTO: 0.48 K/UL — SIGNIFICANT CHANGE UP (ref 0–0.9)
MONOCYTES NFR BLD AUTO: 6.8 % — SIGNIFICANT CHANGE UP (ref 2–14)
NEUTROPHILS # BLD AUTO: 4.89 K/UL — SIGNIFICANT CHANGE UP (ref 1.8–7.4)
NEUTROPHILS NFR BLD AUTO: 69.2 % — SIGNIFICANT CHANGE UP (ref 43–77)
NITRITE UR-MCNC: NEGATIVE — SIGNIFICANT CHANGE UP
NRBC # BLD: 0 /100 WBCS — SIGNIFICANT CHANGE UP (ref 0–0)
PH UR: 6 — SIGNIFICANT CHANGE UP (ref 5–8)
PLATELET # BLD AUTO: 268 K/UL — SIGNIFICANT CHANGE UP (ref 150–400)
POTASSIUM SERPL-MCNC: 4 MMOL/L — SIGNIFICANT CHANGE UP (ref 3.5–5.3)
POTASSIUM SERPL-SCNC: 4 MMOL/L — SIGNIFICANT CHANGE UP (ref 3.5–5.3)
PROT SERPL-MCNC: 7.2 G/DL — SIGNIFICANT CHANGE UP (ref 6–8.3)
PROT UR-MCNC: NEGATIVE — SIGNIFICANT CHANGE UP
RBC # BLD: 5.49 M/UL — SIGNIFICANT CHANGE UP (ref 4.2–5.8)
RBC # FLD: 14.4 % — SIGNIFICANT CHANGE UP (ref 10.3–14.5)
RBC CASTS # UR COMP ASSIST: SIGNIFICANT CHANGE UP /HPF (ref 0–2)
SODIUM SERPL-SCNC: 137 MMOL/L — SIGNIFICANT CHANGE UP (ref 135–145)
SP GR SPEC: 1.01 — SIGNIFICANT CHANGE UP (ref 1.01–1.02)
UROBILINOGEN FLD QL: NEGATIVE — SIGNIFICANT CHANGE UP
WBC # BLD: 7.07 K/UL — SIGNIFICANT CHANGE UP (ref 3.8–10.5)
WBC # FLD AUTO: 7.07 K/UL — SIGNIFICANT CHANGE UP (ref 3.8–10.5)
WBC UR QL: SIGNIFICANT CHANGE UP /HPF (ref 0–5)

## 2020-06-26 PROCEDURE — 99285 EMERGENCY DEPT VISIT HI MDM: CPT

## 2020-06-26 RX ORDER — AZITHROMYCIN 500 MG/1
1000 TABLET, FILM COATED ORAL ONCE
Refills: 0 | Status: COMPLETED | OUTPATIENT
Start: 2020-06-26 | End: 2020-06-26

## 2020-06-26 RX ORDER — ONDANSETRON 8 MG/1
4 TABLET, FILM COATED ORAL ONCE
Refills: 0 | Status: COMPLETED | OUTPATIENT
Start: 2020-06-26 | End: 2020-06-26

## 2020-06-26 RX ORDER — TAMSULOSIN HYDROCHLORIDE 0.4 MG/1
0.4 CAPSULE ORAL AT BEDTIME
Refills: 0 | Status: DISCONTINUED | OUTPATIENT
Start: 2020-06-26 | End: 2020-06-27

## 2020-06-26 RX ORDER — CEFTRIAXONE 500 MG/1
250 INJECTION, POWDER, FOR SOLUTION INTRAMUSCULAR; INTRAVENOUS ONCE
Refills: 0 | Status: COMPLETED | OUTPATIENT
Start: 2020-06-26 | End: 2020-06-26

## 2020-06-26 RX ORDER — SODIUM CHLORIDE 9 MG/ML
1000 INJECTION INTRAMUSCULAR; INTRAVENOUS; SUBCUTANEOUS ONCE
Refills: 0 | Status: COMPLETED | OUTPATIENT
Start: 2020-06-26 | End: 2020-06-26

## 2020-06-26 RX ADMIN — SODIUM CHLORIDE 1000 MILLILITER(S): 9 INJECTION INTRAMUSCULAR; INTRAVENOUS; SUBCUTANEOUS at 18:20

## 2020-06-26 RX ADMIN — AZITHROMYCIN 1000 MILLIGRAM(S): 500 TABLET, FILM COATED ORAL at 18:20

## 2020-06-26 RX ADMIN — CEFTRIAXONE 250 MILLIGRAM(S): 500 INJECTION, POWDER, FOR SOLUTION INTRAMUSCULAR; INTRAVENOUS at 18:20

## 2020-06-26 RX ADMIN — ONDANSETRON 4 MILLIGRAM(S): 8 TABLET, FILM COATED ORAL at 18:20

## 2020-06-26 NOTE — H&P ADULT - HISTORY OF PRESENT ILLNESS
30M from [facility], [facility] of mood disorder, intellectual disability, factitious disorder, asthma, GERD p/w 30M from [facility], [facility] of mood disorder, intellectual disability, factitious disorder, asthma, GERD, hypothyroidism, p/w burning sensation of urine. Pt states he had sexual intercourse yesterday with his fiance and has had dysuria since then. He states he has been having dysuria with occasional blood in urine for 2 months, specially after sexual intercourse. He reports only one partner and uses condom as contraceptive measures. He denies any abdominal pain, penile discharge, fever, nausea, vomiting, joint pain, rash.

## 2020-06-26 NOTE — ED ADULT NURSE NOTE - CHPI ED NUR SYMPTOMS NEG
no rash/no abdominal pain/no chills/no cough/no decreased eating/drinking/no fever/no diarrhea/no vomiting/no headache/no shortness of breath

## 2020-06-26 NOTE — H&P ADULT - PROBLEM SELECTOR PLAN 6
IMPROVE VTE Individual Risk Assessment  RISK                                                                Points  [  ] Previous VTE                                                  3  [  ] Thrombophilia                                               2  [  ] Lower limb paralysis                                      2        (unable to hold up >15 seconds)    [  ] Current Cancer                                              2         (within 6 months)  [x  ] Immobilization > 24 hrs                                1  [  ] ICU/CCU stay > 24 hours                              1  [x  ] Age > 60                                                      1  IMPROVE VTE Score _________2,  for DVT proph IMPROVE VTE Individual Risk Assessment  RISK                                                                Points  [  ] Previous VTE                                                  3  [  ] Thrombophilia                                               2  [  ] Lower limb paralysis                                      2        (unable to hold up >15 seconds)    [  ] Current Cancer                                              2         (within 6 months)  [x  ] Immobilization > 24 hrs                                1  [  ] ICU/CCU stay > 24 hours                              1  [  ] Age > 60                                                      1  IMPROVE VTE Score 1,  no indication for DVT proph

## 2020-06-26 NOTE — H&P ADULT - PROBLEM SELECTOR PLAN 1
p/w urinary retention  750 ml drained from straight cath in ED  mckeon placed in ED  UA negative  HIV negative  FU chlamydia, gonorrhea, syphillis, Hepatitis panel p/w dysuria, afebrile, no penile discharge  found to have urinary retention in ED  750 ml drained from straight cath in ED  mckeon placed in ED  Pt. on prazosin at home, will c/w doxazosin  UA negative  HIV negative  s/p rocephin and azithro in ED, will hold off antibiotics for now as infection unlikely  FU chlamydia, gonorrhea, syphillis, Hepatitis panel  Urology consult

## 2020-06-26 NOTE — ED ADULT NURSE REASSESSMENT NOTE - NS ED NURSE REASSESS COMMENT FT1
8 30 pm bladder scan done . straight catheter done .sent urine for UA ,Urine c/s and clamidia. pt comfortable no distress

## 2020-06-26 NOTE — ED PROVIDER NOTE - OBJECTIVE STATEMENT
31 y/o M with PMHx of asthma, GERD, intellectual disability, mood disorder presents to ED complaining of burning urination. Pt states he had sexual intercourse yesterday with the same partner and has had trouble urinating since. Pt adds last time, he had only masturbated with a condom and had no sexual intercourse. Pt is asking to have STD testing and to be given medications. Pt denies fever, joint pain, penile discharge, or any other complaints. Pt allergic to Zyprexa.

## 2020-06-26 NOTE — H&P ADULT - ASSESSMENT
30M from [facility], [facility] of mood disorder, intellectual disability, factitious disorder, asthma, GERD admitted for urinary retention. 30M from [facility], [facility] of mood disorder, intellectual disability, factitious disorder, asthma, GERD, hypothyroidism, p/w burning sensation of urine. Pt states he had sexual intercourse yesterday with his fiance and has had dysuria since then. He states he has been having dysuria with occasional blood in urine for 2 months, specially after sexual intercourse. He reports only one partner and uses condom as contraceptive measures. He denies any abdominal pain, penile discharge, fever, nausea, vomiting, joint pain, rash.

## 2020-06-26 NOTE — ED PROVIDER NOTE - CLINICAL SUMMARY MEDICAL DECISION MAKING FREE TEXT BOX
29 y/o M presents to ED with possible STD exposure. Pt wants blood work done and urine checked. Will give prophylaxis.

## 2020-06-26 NOTE — ED PROVIDER NOTE - PROGRESS NOTE DETAILS
Ayoub: work up neg.  patient is asking staff to admit pt.  refusing to give urine.  will do bladder scan and straight cath if refusing to give. Ayoub: 750 ml drained from straight cath.  will put mckeon and admit for urinary retention Mega:

## 2020-06-26 NOTE — H&P ADULT - PROBLEM SELECTOR PLAN 3
IMPROVE VTE Individual Risk Assessment  RISK                                                                Points  [  ] Previous VTE                                                  3  [  ] Thrombophilia                                               2  [  ] Lower limb paralysis                                      2        (unable to hold up >15 seconds)    [  ] Current Cancer                                              2         (within 6 months)  [x  ] Immobilization > 24 hrs                                1  [  ] ICU/CCU stay > 24 hours                              1  [x  ] Age > 60                                                      1  IMPROVE VTE Score _________2,  for DVT proph c/w synthroid  FU TSH in AM

## 2020-06-26 NOTE — ED PROVIDER NOTE - MDM ORDERS SUBMITTED SELECTION
I have seen and examined the patient. I agree with the above history, physical exam, and plan of care except for as detailed below.    87 y/o M w/complicated PMH admitted for MRSA bacteremia and empyema s/p pleurX placement which was pulled out by the patient, and there is still residual pleural fluid.    - Continue vanc,  - VATS likely a better option as patient at risk to remove any attempt at chest tube placement, however management as per CT surgery team  - TTE/GUADALUPE to evaluate for endocarditis/device seeding Medications/Labs

## 2020-06-26 NOTE — H&P ADULT - NSICDXPASTMEDICALHX_GEN_ALL_CORE_FT
PAST MEDICAL HISTORY:  Asthma     Factitious disorder     GERD (gastroesophageal reflux disease)     Impulse control disorder     Intellectual disability     Mitral valve disease     Mood disorder

## 2020-06-26 NOTE — H&P ADULT - NSHPPHYSICALEXAM_GEN_ALL_CORE
Vital Signs Last 24 Hrs  T(C): 36.8 (26 Jun 2020 17:22), Max: 36.8 (26 Jun 2020 17:22)  T(F): 98.3 (26 Jun 2020 17:22), Max: 98.3 (26 Jun 2020 17:22)  HR: 98 (26 Jun 2020 17:22) (98 - 98)  BP: 121/63 (26 Jun 2020 17:22) (121/63 - 121/63)  BP(mean): --  RR: 17 (26 Jun 2020 17:22) (17 - 17)  SpO2: 97% (26 Jun 2020 17:22) (97% - 97%)    PHYSICAL EXAM:  GENERAL: NAD  HEENT: Normocephalic;  conjunctivae and sclerae clear; moist mucous membranes;   NECK : supple  CHEST/LUNG: Clear to auscultation bilaterally with good air entry   HEART: S1 S2  regular; no murmurs, gallops or rubs  ABDOMEN: Soft, Nontender, Nondistended; Bowel sounds present  EXTREMITIES: no cyanosis; no edema; no calf tenderness  SKIN: warm and dry; no rash  NERVOUS SYSTEM:  Awake and alert; Oriented  to place, person and time ; no new deficits Vital Signs Last 24 Hrs  T(C): 36.8 (26 Jun 2020 17:22), Max: 36.8 (26 Jun 2020 17:22)  T(F): 98.3 (26 Jun 2020 17:22), Max: 98.3 (26 Jun 2020 17:22)  HR: 98 (26 Jun 2020 17:22) (98 - 98)  BP: 121/63 (26 Jun 2020 17:22) (121/63 - 121/63)  BP(mean): --  RR: 17 (26 Jun 2020 17:22) (17 - 17)  SpO2: 97% (26 Jun 2020 17:22) (97% - 97%)    PHYSICAL EXAM:  GENERAL: NAD, +mckeon in place  HEENT: Normocephalic;  conjunctivae and sclerae clear; moist mucous membranes;   NECK : supple  CHEST/LUNG: Clear to auscultation bilaterally with good air entry   HEART: S1 S2  regular; no murmurs, gallops or rubs  ABDOMEN: Soft, Nontender, Nondistended; Bowel sounds present  EXTREMITIES: no cyanosis; no edema; no calf tenderness  SKIN: warm and dry; no rash  NERVOUS SYSTEM:  Awake and alert; Oriented  to place, person and time ; no new deficits

## 2020-06-27 DIAGNOSIS — I10 ESSENTIAL (PRIMARY) HYPERTENSION: ICD-10-CM

## 2020-06-27 DIAGNOSIS — J45.909 UNSPECIFIED ASTHMA, UNCOMPLICATED: ICD-10-CM

## 2020-06-27 DIAGNOSIS — E03.9 HYPOTHYROIDISM, UNSPECIFIED: ICD-10-CM

## 2020-06-27 LAB
24R-OH-CALCIDIOL SERPL-MCNC: 37.6 NG/ML — SIGNIFICANT CHANGE UP (ref 30–80)
A1C WITH ESTIMATED AVERAGE GLUCOSE RESULT: 5.4 % — SIGNIFICANT CHANGE UP (ref 4–5.6)
ALBUMIN SERPL ELPH-MCNC: 3.1 G/DL — LOW (ref 3.5–5)
ALP SERPL-CCNC: 97 U/L — SIGNIFICANT CHANGE UP (ref 40–120)
ALT FLD-CCNC: 31 U/L DA — SIGNIFICANT CHANGE UP (ref 10–60)
ANION GAP SERPL CALC-SCNC: 2 MMOL/L — LOW (ref 5–17)
ANION GAP SERPL CALC-SCNC: 6 MMOL/L — SIGNIFICANT CHANGE UP (ref 5–17)
AST SERPL-CCNC: 78 U/L — HIGH (ref 10–40)
BASOPHILS # BLD AUTO: 0.02 K/UL — SIGNIFICANT CHANGE UP (ref 0–0.2)
BASOPHILS NFR BLD AUTO: 0.3 % — SIGNIFICANT CHANGE UP (ref 0–2)
BILIRUB SERPL-MCNC: 0.2 MG/DL — SIGNIFICANT CHANGE UP (ref 0.2–1.2)
BUN SERPL-MCNC: 10 MG/DL — SIGNIFICANT CHANGE UP (ref 7–18)
BUN SERPL-MCNC: 9 MG/DL — SIGNIFICANT CHANGE UP (ref 7–18)
C TRACH RRNA SPEC QL NAA+PROBE: SIGNIFICANT CHANGE UP
CALCIUM SERPL-MCNC: 8.1 MG/DL — LOW (ref 8.4–10.5)
CALCIUM SERPL-MCNC: 8.8 MG/DL — SIGNIFICANT CHANGE UP (ref 8.4–10.5)
CHLORIDE SERPL-SCNC: 105 MMOL/L — SIGNIFICANT CHANGE UP (ref 96–108)
CHLORIDE SERPL-SCNC: 109 MMOL/L — HIGH (ref 96–108)
CHOLEST SERPL-MCNC: 116 MG/DL — SIGNIFICANT CHANGE UP (ref 10–199)
CO2 SERPL-SCNC: 25 MMOL/L — SIGNIFICANT CHANGE UP (ref 22–31)
CO2 SERPL-SCNC: 26 MMOL/L — SIGNIFICANT CHANGE UP (ref 22–31)
CREAT SERPL-MCNC: 0.78 MG/DL — SIGNIFICANT CHANGE UP (ref 0.5–1.3)
CREAT SERPL-MCNC: 0.84 MG/DL — SIGNIFICANT CHANGE UP (ref 0.5–1.3)
EOSINOPHIL # BLD AUTO: 0.07 K/UL — SIGNIFICANT CHANGE UP (ref 0–0.5)
EOSINOPHIL NFR BLD AUTO: 0.9 % — SIGNIFICANT CHANGE UP (ref 0–6)
ESTIMATED AVERAGE GLUCOSE: 108 MG/DL — SIGNIFICANT CHANGE UP (ref 68–114)
GLUCOSE SERPL-MCNC: 72 MG/DL — SIGNIFICANT CHANGE UP (ref 70–99)
GLUCOSE SERPL-MCNC: 80 MG/DL — SIGNIFICANT CHANGE UP (ref 70–99)
HBV SURFACE AB SER-ACNC: REACTIVE
HBV SURFACE AG SER-ACNC: SIGNIFICANT CHANGE UP
HCT VFR BLD CALC: 45.7 % — SIGNIFICANT CHANGE UP (ref 39–50)
HCV AB S/CO SERPL IA: 0.09 S/CO — SIGNIFICANT CHANGE UP (ref 0–0.99)
HCV AB SERPL-IMP: SIGNIFICANT CHANGE UP
HDLC SERPL-MCNC: 45 MG/DL — SIGNIFICANT CHANGE UP
HGB BLD-MCNC: 14.2 G/DL — SIGNIFICANT CHANGE UP (ref 13–17)
IMM GRANULOCYTES NFR BLD AUTO: 0.4 % — SIGNIFICANT CHANGE UP (ref 0–1.5)
LIPID PNL WITH DIRECT LDL SERPL: 61 MG/DL — SIGNIFICANT CHANGE UP
LYMPHOCYTES # BLD AUTO: 1.71 K/UL — SIGNIFICANT CHANGE UP (ref 1–3.3)
LYMPHOCYTES # BLD AUTO: 21.8 % — SIGNIFICANT CHANGE UP (ref 13–44)
MAGNESIUM SERPL-MCNC: 2.3 MG/DL — SIGNIFICANT CHANGE UP (ref 1.6–2.6)
MCHC RBC-ENTMCNC: 24.3 PG — LOW (ref 27–34)
MCHC RBC-ENTMCNC: 31.1 GM/DL — LOW (ref 32–36)
MCV RBC AUTO: 78.1 FL — LOW (ref 80–100)
MONOCYTES # BLD AUTO: 0.64 K/UL — SIGNIFICANT CHANGE UP (ref 0–0.9)
MONOCYTES NFR BLD AUTO: 8.2 % — SIGNIFICANT CHANGE UP (ref 2–14)
N GONORRHOEA RRNA SPEC QL NAA+PROBE: SIGNIFICANT CHANGE UP
NEUTROPHILS # BLD AUTO: 5.38 K/UL — SIGNIFICANT CHANGE UP (ref 1.8–7.4)
NEUTROPHILS NFR BLD AUTO: 68.4 % — SIGNIFICANT CHANGE UP (ref 43–77)
NRBC # BLD: 0 /100 WBCS — SIGNIFICANT CHANGE UP (ref 0–0)
PHOSPHATE SERPL-MCNC: 4.5 MG/DL — SIGNIFICANT CHANGE UP (ref 2.5–4.5)
PLATELET # BLD AUTO: 271 K/UL — SIGNIFICANT CHANGE UP (ref 150–400)
POTASSIUM SERPL-MCNC: 4.6 MMOL/L — SIGNIFICANT CHANGE UP (ref 3.5–5.3)
POTASSIUM SERPL-MCNC: 9.7 MMOL/L — CRITICAL HIGH (ref 3.5–5.3)
POTASSIUM SERPL-SCNC: 4.6 MMOL/L — SIGNIFICANT CHANGE UP (ref 3.5–5.3)
POTASSIUM SERPL-SCNC: 9.7 MMOL/L — CRITICAL HIGH (ref 3.5–5.3)
PROT SERPL-MCNC: 6.8 G/DL — SIGNIFICANT CHANGE UP (ref 6–8.3)
PSA FLD-MCNC: 0.73 NG/ML — SIGNIFICANT CHANGE UP (ref 0–4)
RBC # BLD: 5.85 M/UL — HIGH (ref 4.2–5.8)
RBC # FLD: 14.4 % — SIGNIFICANT CHANGE UP (ref 10.3–14.5)
SARS-COV-2 RNA SPEC QL NAA+PROBE: SIGNIFICANT CHANGE UP
SODIUM SERPL-SCNC: 133 MMOL/L — LOW (ref 135–145)
SODIUM SERPL-SCNC: 140 MMOL/L — SIGNIFICANT CHANGE UP (ref 135–145)
SPECIMEN SOURCE: SIGNIFICANT CHANGE UP
T PALLIDUM AB TITR SER: NEGATIVE — SIGNIFICANT CHANGE UP
TOTAL CHOLESTEROL/HDL RATIO MEASUREMENT: 2.6 RATIO — LOW (ref 3.4–9.6)
TRIGL SERPL-MCNC: 52 MG/DL — SIGNIFICANT CHANGE UP (ref 10–149)
TSH SERPL-MCNC: 0.89 UU/ML — SIGNIFICANT CHANGE UP (ref 0.34–4.82)
VIT B12 SERPL-MCNC: 306 PG/ML — SIGNIFICANT CHANGE UP (ref 232–1245)
WBC # BLD: 7.85 K/UL — SIGNIFICANT CHANGE UP (ref 3.8–10.5)
WBC # FLD AUTO: 7.85 K/UL — SIGNIFICANT CHANGE UP (ref 3.8–10.5)

## 2020-06-27 RX ORDER — BENZTROPINE MESYLATE 1 MG
0 TABLET ORAL
Qty: 0 | Refills: 0 | DISCHARGE

## 2020-06-27 RX ORDER — LIDOCAINE 4 G/100G
1 CREAM TOPICAL
Refills: 0 | Status: DISCONTINUED | OUTPATIENT
Start: 2020-06-27 | End: 2020-06-30

## 2020-06-27 RX ORDER — OXCARBAZEPINE 300 MG/1
300 TABLET, FILM COATED ORAL
Refills: 0 | Status: DISCONTINUED | OUTPATIENT
Start: 2020-06-27 | End: 2020-06-30

## 2020-06-27 RX ORDER — OMEPRAZOLE 10 MG/1
0 CAPSULE, DELAYED RELEASE ORAL
Qty: 0 | Refills: 0 | DISCHARGE

## 2020-06-27 RX ORDER — POLYETHYLENE GLYCOL 3350 17 G/17G
17 POWDER, FOR SOLUTION ORAL DAILY
Refills: 0 | Status: DISCONTINUED | OUTPATIENT
Start: 2020-06-27 | End: 2020-06-30

## 2020-06-27 RX ORDER — CEFTRIAXONE 500 MG/1
1000 INJECTION, POWDER, FOR SOLUTION INTRAMUSCULAR; INTRAVENOUS EVERY 24 HOURS
Refills: 0 | Status: DISCONTINUED | OUTPATIENT
Start: 2020-06-27 | End: 2020-06-30

## 2020-06-27 RX ORDER — TAMSULOSIN HYDROCHLORIDE 0.4 MG/1
0.4 CAPSULE ORAL AT BEDTIME
Refills: 0 | Status: DISCONTINUED | OUTPATIENT
Start: 2020-06-27 | End: 2020-06-30

## 2020-06-27 RX ORDER — BACLOFEN 100 %
10 POWDER (GRAM) MISCELLANEOUS THREE TIMES A DAY
Refills: 0 | Status: DISCONTINUED | OUTPATIENT
Start: 2020-06-27 | End: 2020-06-30

## 2020-06-27 RX ORDER — PERPHENAZINE 8 MG/1
4 TABLET, FILM COATED ORAL THREE TIMES A DAY
Refills: 0 | Status: DISCONTINUED | OUTPATIENT
Start: 2020-06-27 | End: 2020-06-30

## 2020-06-27 RX ORDER — PANTOPRAZOLE SODIUM 20 MG/1
40 TABLET, DELAYED RELEASE ORAL
Refills: 0 | Status: DISCONTINUED | OUTPATIENT
Start: 2020-06-27 | End: 2020-06-30

## 2020-06-27 RX ORDER — FLUOXETINE HCL 10 MG
30 CAPSULE ORAL DAILY
Refills: 0 | Status: DISCONTINUED | OUTPATIENT
Start: 2020-06-27 | End: 2020-06-30

## 2020-06-27 RX ORDER — FLUOXETINE HCL 10 MG
0 CAPSULE ORAL
Qty: 0 | Refills: 0 | DISCHARGE

## 2020-06-27 RX ORDER — TAMSULOSIN HYDROCHLORIDE 0.4 MG/1
0.4 CAPSULE ORAL AT BEDTIME
Refills: 0 | Status: DISCONTINUED | OUTPATIENT
Start: 2020-06-27 | End: 2020-06-27

## 2020-06-27 RX ORDER — BENZTROPINE MESYLATE 1 MG
1 TABLET ORAL
Refills: 0 | Status: DISCONTINUED | OUTPATIENT
Start: 2020-06-27 | End: 2020-06-30

## 2020-06-27 RX ORDER — BUDESONIDE AND FORMOTEROL FUMARATE DIHYDRATE 160; 4.5 UG/1; UG/1
2 AEROSOL RESPIRATORY (INHALATION)
Refills: 0 | Status: DISCONTINUED | OUTPATIENT
Start: 2020-06-27 | End: 2020-06-30

## 2020-06-27 RX ORDER — POLYETHYLENE GLYCOL 3350 17 G/17G
0 POWDER, FOR SOLUTION ORAL
Qty: 0 | Refills: 0 | DISCHARGE

## 2020-06-27 RX ORDER — FINASTERIDE 5 MG/1
5 TABLET, FILM COATED ORAL DAILY
Refills: 0 | Status: DISCONTINUED | OUTPATIENT
Start: 2020-06-27 | End: 2020-06-30

## 2020-06-27 RX ORDER — LEVOTHYROXINE SODIUM 125 MCG
0 TABLET ORAL
Qty: 0 | Refills: 0 | DISCHARGE

## 2020-06-27 RX ORDER — DOCUSATE SODIUM 100 MG
0 CAPSULE ORAL
Qty: 0 | Refills: 0 | DISCHARGE

## 2020-06-27 RX ORDER — SODIUM CHLORIDE 9 MG/ML
1000 INJECTION INTRAMUSCULAR; INTRAVENOUS; SUBCUTANEOUS
Refills: 0 | Status: DISCONTINUED | OUTPATIENT
Start: 2020-06-27 | End: 2020-06-30

## 2020-06-27 RX ORDER — AMANTADINE HCL 100 MG
100 CAPSULE ORAL
Refills: 0 | Status: DISCONTINUED | OUTPATIENT
Start: 2020-06-27 | End: 2020-06-30

## 2020-06-27 RX ORDER — SERTRALINE 25 MG/1
0 TABLET, FILM COATED ORAL
Qty: 0 | Refills: 0 | DISCHARGE

## 2020-06-27 RX ORDER — LITHIUM CARBONATE 300 MG/1
150 TABLET, EXTENDED RELEASE ORAL
Refills: 0 | Status: DISCONTINUED | OUTPATIENT
Start: 2020-06-27 | End: 2020-06-30

## 2020-06-27 RX ORDER — ERGOCALCIFEROL 1.25 MG/1
50000 CAPSULE ORAL
Refills: 0 | Status: DISCONTINUED | OUTPATIENT
Start: 2020-06-27 | End: 2020-06-30

## 2020-06-27 RX ORDER — DIVALPROEX SODIUM 500 MG/1
0 TABLET, DELAYED RELEASE ORAL
Qty: 0 | Refills: 0 | DISCHARGE

## 2020-06-27 RX ORDER — DIVALPROEX SODIUM 500 MG/1
500 TABLET, DELAYED RELEASE ORAL
Refills: 0 | Status: DISCONTINUED | OUTPATIENT
Start: 2020-06-27 | End: 2020-06-30

## 2020-06-27 RX ORDER — ACETAMINOPHEN 500 MG
1000 TABLET ORAL ONCE
Refills: 0 | Status: COMPLETED | OUTPATIENT
Start: 2020-06-27 | End: 2020-06-27

## 2020-06-27 RX ORDER — SIMVASTATIN 20 MG/1
5 TABLET, FILM COATED ORAL AT BEDTIME
Refills: 0 | Status: DISCONTINUED | OUTPATIENT
Start: 2020-06-27 | End: 2020-06-30

## 2020-06-27 RX ORDER — HALOPERIDOL DECANOATE 100 MG/ML
0 INJECTION INTRAMUSCULAR
Qty: 0 | Refills: 0 | DISCHARGE

## 2020-06-27 RX ORDER — LEVOTHYROXINE SODIUM 125 MCG
50 TABLET ORAL DAILY
Refills: 0 | Status: DISCONTINUED | OUTPATIENT
Start: 2020-06-27 | End: 2020-06-30

## 2020-06-27 RX ORDER — GABAPENTIN 400 MG/1
800 CAPSULE ORAL THREE TIMES A DAY
Refills: 0 | Status: DISCONTINUED | OUTPATIENT
Start: 2020-06-27 | End: 2020-06-30

## 2020-06-27 RX ORDER — DOXAZOSIN MESYLATE 4 MG
2 TABLET ORAL AT BEDTIME
Refills: 0 | Status: DISCONTINUED | OUTPATIENT
Start: 2020-06-27 | End: 2020-06-27

## 2020-06-27 RX ADMIN — Medication 10 MILLIGRAM(S): at 06:09

## 2020-06-27 RX ADMIN — PANTOPRAZOLE SODIUM 40 MILLIGRAM(S): 20 TABLET, DELAYED RELEASE ORAL at 06:10

## 2020-06-27 RX ADMIN — OXCARBAZEPINE 300 MILLIGRAM(S): 300 TABLET, FILM COATED ORAL at 06:09

## 2020-06-27 RX ADMIN — Medication 0.1 MILLIGRAM(S): at 21:31

## 2020-06-27 RX ADMIN — CEFTRIAXONE 100 MILLIGRAM(S): 500 INJECTION, POWDER, FOR SOLUTION INTRAMUSCULAR; INTRAVENOUS at 18:06

## 2020-06-27 RX ADMIN — GABAPENTIN 800 MILLIGRAM(S): 400 CAPSULE ORAL at 06:08

## 2020-06-27 RX ADMIN — DIVALPROEX SODIUM 500 MILLIGRAM(S): 500 TABLET, DELAYED RELEASE ORAL at 06:09

## 2020-06-27 RX ADMIN — Medication 50 MICROGRAM(S): at 06:09

## 2020-06-27 RX ADMIN — Medication 100 MILLIGRAM(S): at 06:09

## 2020-06-27 RX ADMIN — PERPHENAZINE 4 MILLIGRAM(S): 8 TABLET, FILM COATED ORAL at 21:31

## 2020-06-27 RX ADMIN — ERGOCALCIFEROL 50000 UNIT(S): 1.25 CAPSULE ORAL at 12:27

## 2020-06-27 RX ADMIN — FINASTERIDE 5 MILLIGRAM(S): 5 TABLET, FILM COATED ORAL at 12:27

## 2020-06-27 RX ADMIN — Medication 1 MILLIGRAM(S): at 18:24

## 2020-06-27 RX ADMIN — LITHIUM CARBONATE 150 MILLIGRAM(S): 300 TABLET, EXTENDED RELEASE ORAL at 18:07

## 2020-06-27 RX ADMIN — GABAPENTIN 800 MILLIGRAM(S): 400 CAPSULE ORAL at 14:04

## 2020-06-27 RX ADMIN — SIMVASTATIN 5 MILLIGRAM(S): 20 TABLET, FILM COATED ORAL at 21:31

## 2020-06-27 RX ADMIN — DIVALPROEX SODIUM 500 MILLIGRAM(S): 500 TABLET, DELAYED RELEASE ORAL at 18:06

## 2020-06-27 RX ADMIN — Medication 10 MILLIGRAM(S): at 21:31

## 2020-06-27 RX ADMIN — PERPHENAZINE 4 MILLIGRAM(S): 8 TABLET, FILM COATED ORAL at 18:06

## 2020-06-27 RX ADMIN — LITHIUM CARBONATE 150 MILLIGRAM(S): 300 TABLET, EXTENDED RELEASE ORAL at 06:09

## 2020-06-27 RX ADMIN — Medication 30 MILLIGRAM(S): at 12:27

## 2020-06-27 RX ADMIN — Medication 1 MILLIGRAM(S): at 06:08

## 2020-06-27 RX ADMIN — Medication 1 MILLIGRAM(S): at 18:07

## 2020-06-27 RX ADMIN — PERPHENAZINE 4 MILLIGRAM(S): 8 TABLET, FILM COATED ORAL at 06:09

## 2020-06-27 RX ADMIN — Medication 1 MILLIGRAM(S): at 06:09

## 2020-06-27 RX ADMIN — GABAPENTIN 800 MILLIGRAM(S): 400 CAPSULE ORAL at 21:33

## 2020-06-27 RX ADMIN — Medication 10 MILLIGRAM(S): at 14:04

## 2020-06-27 RX ADMIN — SODIUM CHLORIDE 75 MILLILITER(S): 9 INJECTION INTRAMUSCULAR; INTRAVENOUS; SUBCUTANEOUS at 09:29

## 2020-06-27 RX ADMIN — OXCARBAZEPINE 300 MILLIGRAM(S): 300 TABLET, FILM COATED ORAL at 18:08

## 2020-06-27 RX ADMIN — POLYETHYLENE GLYCOL 3350 17 GRAM(S): 17 POWDER, FOR SOLUTION ORAL at 12:28

## 2020-06-27 RX ADMIN — TAMSULOSIN HYDROCHLORIDE 0.4 MILLIGRAM(S): 0.4 CAPSULE ORAL at 21:31

## 2020-06-27 RX ADMIN — LIDOCAINE 1 APPLICATION(S): 4 CREAM TOPICAL at 18:08

## 2020-06-27 RX ADMIN — Medication 400 MILLIGRAM(S): at 01:15

## 2020-06-27 RX ADMIN — Medication 100 MILLIGRAM(S): at 18:07

## 2020-06-27 RX ADMIN — LIDOCAINE 1 APPLICATION(S): 4 CREAM TOPICAL at 06:10

## 2020-06-27 NOTE — CONSULT NOTE ADULT - ASSESSMENT
30[facility] from senior care, PMH of mood disorder, intellectual disability, factitious disorder, asthma, GERD, hypothyroidism, p/w burning sensation of urine. Pt states he had sexual intercourse yesterday with his fiance and has had dysuria since then. Admits dysuria with occasional blood in urine for 2 months,     Urology was consulted because pt presented with UR with mckeon placement yielding >900cc of clear urine . Of note pt is on prazosin unsure if he has a Hx of BPH ( mother Ms Martinez #[phone] cannot corroborate). [facility] ([place] #[phone]) did not respond to calls. . Pt has intellectual deficits but presently pt is asking for water. labs and UA are wnl . Pt is admitted to medicine     Care as per primary team   Renal US in AM  Consider flomax to replace prazosin  f/up labs   Pls reconsult urology before DC 30M from group home with urinary retention with mckeon placement yielding >900cc of clear urine  labs reviewed  Renal US images reviewed- no hydronephrosis  flomax to replace prazosin  trend creatinine   may give trial of void prior to discharge  Follow up with Dr. Pena after discharge by calling [phone] or [phone] to schedule an appointment.   [address] [facility] [address]  [place] [address]  Card given to patient.

## 2020-06-27 NOTE — PROGRESS NOTE ADULT - SUBJECTIVE AND OBJECTIVE BOX
Patient is a 30y old  Male who presents with a chief complaint of Dysuria (27 Jun 2020 06:45)    PATIENT IS SEEN AND EXAMINED IN MEDICAL FLOOR.    ALLERGIES:  Zyprexa (Dystonic RXN)    Daily Height in cm: 172.72 (26 Jun 2020 17:22)    Daily     VITALS:    Vital Signs Last 24 Hrs  T(C): 36.4 (27 Jun 2020 15:39), Max: 36.8 (26 Jun 2020 17:22)  T(F): 97.6 (27 Jun 2020 15:39), Max: 98.3 (26 Jun 2020 17:22)  HR: 70 (27 Jun 2020 15:39) (64 - 98)  BP: 113/66 (27 Jun 2020 15:39) (103/67 - 124/83)  BP(mean): --  RR: 17 (27 Jun 2020 15:39) (16 - 18)  SpO2: 98% (27 Jun 2020 15:39) (97% - 100%)    LABS:    CBC Full  -  ( 27 Jun 2020 06:36 )  WBC Count : 7.85 K/uL  RBC Count : 5.85 M/uL  Hemoglobin : 14.2 g/dL  Hematocrit : 45.7 %  Platelet Count - Automated : 271 K/uL  Mean Cell Volume : 78.1 fl  Mean Cell Hemoglobin : 24.3 pg  Mean Cell Hemoglobin Concentration : 31.1 gm/dL  Auto Neutrophil # : 5.38 K/uL  Auto Lymphocyte # : 1.71 K/uL  Auto Monocyte # : 0.64 K/uL  Auto Eosinophil # : 0.07 K/uL  Auto Basophil # : 0.02 K/uL  Auto Neutrophil % : 68.4 %  Auto Lymphocyte % : 21.8 %  Auto Monocyte % : 8.2 %  Auto Eosinophil % : 0.9 %  Auto Basophil % : 0.3 %      06-27    140  |  109<H>  |  9   ----------------------------<  80  4.6   |  25  |  0.84    Ca    8.8      27 Jun 2020 07:49  Phos  4.5     06-27  Mg     2.3     06-27    TPro  6.8  /  Alb  3.1<L>  /  TBili  0.2  /  DBili  x   /  AST  78<H>  /  ALT  31  /  AlkPhos  97  06-27    CAPILLARY BLOOD GLUCOSE    LIVER FUNCTIONS - ( 27 Jun 2020 06:36 )  Alb: 3.1 g/dL / Pro: 6.8 g/dL / ALK PHOS: 97 U/L / ALT: 31 U/L DA / AST: 78 U/L / GGT: x           Creatinine Trend: 0.84<--, 0.78<--, 0.93<--, 0.74<--, 0.65<--  I&O's Summary    26 Jun 2020 07:01  -  27 Jun 2020 07:00  --------------------------------------------------------  IN: 0 mL / OUT: 1100 mL / NET: -1100 mL    27 Jun 2020 07:01  -  27 Jun 2020 15:48  --------------------------------------------------------  IN: 0 mL / OUT: 600 mL / NET: -600 mL    .Urine Clean Catch (Midstream)  06-24 @ 18:47   <10,000 CFU/mL Normal Urogenital Sarah  --  --    MEDICATIONS:    MEDICATIONS  (STANDING):  amantadine 100 milliGRAM(s) Oral two times a day  baclofen 10 milliGRAM(s) Oral three times a day  benztropine 1 milliGRAM(s) Oral two times a day  budesonide  80 MICROgram(s)/formoterol 4.5 MICROgram(s) Inhaler 2 Puff(s) Inhalation two times a day  cloNIDine 0.1 milliGRAM(s) Oral at bedtime  diVALproex  milliGRAM(s) Oral two times a day  ergocalciferol 39197 Unit(s) Oral every week  finasteride 5 milliGRAM(s) Oral daily  FLUoxetine 30 milliGRAM(s) Oral daily  gabapentin 800 milliGRAM(s) Oral three times a day  levothyroxine 50 MICROGram(s) Oral daily  lidocaine 2% Gel 1 Application(s) Topical two times a day  lithium 150 milliGRAM(s) Oral two times a day  LORazepam     Tablet 1 milliGRAM(s) Oral two times a day  OXcarbazepine 300 milliGRAM(s) Oral two times a day  pantoprazole    Tablet 40 milliGRAM(s) Oral before breakfast  perphenazine 4 milliGRAM(s) Oral three times a day  polyethylene glycol 3350 17 Gram(s) Oral daily  simvastatin 5 milliGRAM(s) Oral at bedtime  sodium chloride 0.9%. 1000 milliLiter(s) (75 mL/Hr) IV Continuous <Continuous>  tamsulosin 0.4 milliGRAM(s) Oral at bedtime      MEDICATIONS  (PRN):      REVIEW OF SYSTEMS:                           ALL ROS DONE [ X   ]    CONSTITUTIONAL:  LETHARGIC [   ], FEVER [   ], UNRESPONSIVE [   ]  CVS:  CP  [   ], SOB, [   ], PALPITATIONS [   ], DIZZYNESS [   ]  RS: COUGH [   ], SPUTUM [   ]  GI: ABDOMINAL PAIN [   ], NAUSEA [   ], VOMITINGS [   ], DIARRHEA [   ], CONSTIPATION [   ]  :  DYSURIA [   ], NOCTURIA [   ], INCREASED FREQUENCY [   ], DRIBLING [   ],  SKELETAL: PAINFUL JOINTS [   ], SWOLLEN JOINTS [   ], NECK ACHE [   ], LOW BACK ACHE [   ],  SKIN : ULCERS [   ], RASH [   ], ITCHING [   ]  CNS: HEAD ACHE [   ], DOUBLE VISION [   ], BLURRED VISION [   ], AMS / CONFUSION [   ], SEIZURES [   ], WEAKNESS [   ],TINGLING / NUMBNESS [   ]    PHYSICAL EXAMINATION:  GENERAL APPEARANCE: NO DISTRESS  HEENT:  NO PALLOR, NO  JVD,  NO   NODES, NECK SUPPLE  CVS: S1 +, S2 +,   RS: AEEB,  OCCASIONAL  RALES +,   NO RONCHI  ABD: SOFT, NT, NO, BS +  EXT: NO PE  SKIN: WARM,                               [place] +  SKELETAL:  ROM ACCEPTABLE  CNS:  AAO X 2 ,   NO DEFICITS    RADIOLOGY :    ASSESSMENT :     Retention of urine  Mitral valve disease  Factitious disorder  GERD (gastroesophageal reflux disease)  Asthma  Intellectual disability  Impulse control disorder  Mood disorder  No pertinent past medical history  No significant past surgical history      PLAN:  HPI:  30M from care home, PMH of mood disorder, intellectual disability, factitious disorder, asthma, GERD, hypothyroidism, p/w burning sensation of urine. Pt states he had sexual intercourse yesterday with his fiance and has had dysuria since then. He states he has been having dysuria with occasional blood in urine for 2 months, specially after sexual intercourse. He reports only one partner and uses condom as contraceptive measures. He denies any abdominal pain, penile discharge, fever, nausea, vomiting, joint pain, rash. (26 Jun 2020 22:54)      # URINARY RETENTION W/ DYSURIA - SUSPECT BPH, EMPIRICALLY TREATED FOR STI - UCX FROM 06/24 IS NEGATIVE [REPEAT ORDERED], ON REOCEPHIN, F/U TRANSRECTAL U/S AND RENAL U/S, F/U PSA, CONTINUE FLOMAX, UROLOGY CONSULT IN PROGRESS  # MOOD DISORDER   # HYPOTHYROIDISM  # ASTHMA  # HTN  # SOCIAL WORK CONSULT TO ARRANGE FOR TRANSFER BACK TO GROUP HOME ONCE MEDICALLY STABLE  # GI AND DVT PPX    KHOA DOCKERY M.D. COVERING FOR MARYCRUZ DOCKERY M.D.

## 2020-06-27 NOTE — CONSULT NOTE ADULT - SUBJECTIVE AND OBJECTIVE BOX
UROLOGY CONSULT NOTE     chief complaint of Dysuria/Urinary Retention       HPI:  30M from [facility], PMH of mood disorder, intellectual disability, factitious disorder, asthma, GERD, hypothyroidism, p/w burning sensation of urine. Pt states he had sexual intercourse yesterday with his fiance and has had dysuria since then. He states he has been having dysuria with occasional blood in urine for 2 months, specially after sexual intercourse. He reports only one partner and uses condom as contraceptive measures. He denies any abdominal pain, penile discharge, fever, nausea, vomiting, joint pain, rash.     Urology was consulted because pt presented with UR with mckeon placement yielding >900cc of clear urine . Of note pt is on prazosin unsure if he has a Hx of BPH ( mother Ms Martinez #[phone] cannot corroborate). [facility] ([place] #[phone]) did not respond to calls. . Pt has intellectual deficits but presently pt is asking for water. No other complaints at this time     PAST MEDICAL & SURGICAL HISTORY:  Mitral valve disease  Factitious disorder  GERD (gastroesophageal reflux disease)  Asthma  Intellectual disability  Impulse control disorder  Mood disorder  No significant past surgical history      Review of Systems:    I have reviewed 9 systems with the patient and the only positive findings were     MEDICATIONS  (STANDING):  amantadine 100 milliGRAM(s) Oral two times a day  baclofen 10 milliGRAM(s) Oral three times a day  benztropine 1 milliGRAM(s) Oral two times a day  budesonide  80 MICROgram(s)/formoterol 4.5 MICROgram(s) Inhaler 2 Puff(s) Inhalation two times a day  cloNIDine 0.1 milliGRAM(s) Oral at bedtime  diVALproex  milliGRAM(s) Oral two times a day  ergocalciferol 88072 Unit(s) Oral every week  finasteride 5 milliGRAM(s) Oral daily  FLUoxetine 30 milliGRAM(s) Oral daily  gabapentin 800 milliGRAM(s) Oral three times a day  levothyroxine 50 MICROGram(s) Oral daily  lidocaine 2% Gel 1 Application(s) Topical two times a day  lithium 150 milliGRAM(s) Oral two times a day  LORazepam     Tablet 1 milliGRAM(s) Oral two times a day  OXcarbazepine 300 milliGRAM(s) Oral two times a day  pantoprazole    Tablet 40 milliGRAM(s) Oral before breakfast  perphenazine 4 milliGRAM(s) Oral three times a day  polyethylene glycol 3350 17 Gram(s) Oral daily  simvastatin 5 milliGRAM(s) Oral at bedtime  tamsulosin 0.4 milliGRAM(s) Oral at bedtime    MEDICATIONS  (PRN):      Allergies    Zyprexa (Dystonic RXN)    Intolerances        Social History:  Drinks about 2 beers/2-3 days, denies smoking/drugs (2020 22:54)      FAMILY HISTORY:  No pertinent family history in first degree relatives      Vital Signs Last 24 Hrs  T(C): 36.3 (2020 04:39), Max: 36.8 (2020 17:22)  T(F): 97.4 (2020 04:39), Max: 98.3 (:22)  HR: 64 (2020 04:39) (64 - 98)  BP: 105/56 (2020 04:39) (103/67 - 121/63)  BP(mean): --  RR: 16 (2020 04:39) (16 - 17)  SpO2: 100% (2020 04:39) (97% - 100%)    Physical Exam:  Vital Signs Last 24 Hrs  T(C): 36.3 (2020 04:39), Max: 36.8 (2020 17:22)  T(F): 97.4 (2020 04:39), Max: 98.3 (2020 17:22)  HR: 64 (2020 04:39) (64 - 98)  BP: 105/56 (2020 04:39) (103/67 - 121/63)  BP(mean): --  RR: 16 (2020 04:39) (16 - 17)  SpO2: 100% (2020 04:39) (97% - 100%)    General:  A&Ox3,Appears stated age, No acute distress,  Head: NC/AT  EENT: PERRLA. EOMI. Conjunctiva and sclera clear. Pharynx clear.  Neck: Supple. No JVD  Lungs: CTA B/l. Nonlabored Respirations  CV: +S1S2, RRR  Abdomen: Soft, Nondistended,  Nontender, no guarding, no rebound  : mckeon with clear urine in bag   Extremities: Warm and well perfused. 2+ peripheral pulses b/l. Calf soft, nontender b/l. No pedal edema.      LABS:                        13.2   7.07  )-----------( 268      ( 2020 18:06 )             41.3     -    137  |  104  |  12  ----------------------------<  86  4.0   |  25  |  0.93    Ca    8.8      2020 18:06    TPro  7.2  /  Alb  3.9  /  TBili  0.2  /  DBili  x   /  AST  14  /  ALT  24  /  AlkPhos  114  -    LIVER FUNCTIONS - ( 2020 18:06 )  Alb: 3.9 g/dL / Pro: 7.2 g/dL / ALK PHOS: 114 U/L / ALT: 24 U/L DA / AST: 14 U/L / GGT: x             Urinalysis Basic - ( 2020 21:24 )    Color: Yellow / Appearance: Clear / S.015 / pH: x  Gluc: x / Ketone: Negative  / Bili: Negative / Urobili: Negative   Blood: x / Protein: Negative / Nitrite: Negative   Leuk Esterase: Negative / RBC: 0-2 /HPF / WBC 0-2 /HPF   Sq Epi: x / Non Sq Epi: Few /HPF / Bacteria: Trace /HPF        RADIOLOGY & ADDITIONAL STUDIES: UROLOGY CONSULT NOTE     chief complaint of Dysuria/Urinary Retention       HPI:  30M from [facility], PMH of mood disorder, intellectual disability, factitious disorder, asthma, GERD, hypothyroidism, p/w burning sensation of urine. Pt states he had sexual intercourse yesterday with his fiance and has had dysuria since then. He states he has been having dysuria with occasional blood in urine for 2 months, especially after sexual intercourse. He reports only one partner and uses condom as contraceptive measures. He denies any abdominal pain, penile discharge, fever, nausea, vomiting, joint pain, rash.     Urology was consulted because pt presented with urinary retention with mckeon placement yielding >900cc of clear urine . Of note pt is on prazosin unsure if he has a Hx of BPH ( mother Ms Martinez #[phone] cannot corroborate). [facility] ([place] #[phone]) did not respond to calls. . Pt has intellectual deficits but presently pt is asking for water. No other complaints at this time.     PAST MEDICAL & SURGICAL HISTORY:  Mitral valve disease  Factitious disorder  GERD (gastroesophageal reflux disease)  Asthma  Intellectual disability  Impulse control disorder  Mood disorder  No significant past surgical history    ROS: All others negative except as noted above.        MEDICATIONS  (STANDING):  amantadine 100 milliGRAM(s) Oral two times a day  baclofen 10 milliGRAM(s) Oral three times a day  benztropine 1 milliGRAM(s) Oral two times a day  budesonide  80 MICROgram(s)/formoterol 4.5 MICROgram(s) Inhaler 2 Puff(s) Inhalation two times a day  cloNIDine 0.1 milliGRAM(s) Oral at bedtime  diVALproex  milliGRAM(s) Oral two times a day  ergocalciferol 43044 Unit(s) Oral every week  finasteride 5 milliGRAM(s) Oral daily  FLUoxetine 30 milliGRAM(s) Oral daily  gabapentin 800 milliGRAM(s) Oral three times a day  levothyroxine 50 MICROGram(s) Oral daily  lidocaine 2% Gel 1 Application(s) Topical two times a day  lithium 150 milliGRAM(s) Oral two times a day  LORazepam     Tablet 1 milliGRAM(s) Oral two times a day  OXcarbazepine 300 milliGRAM(s) Oral two times a day  pantoprazole    Tablet 40 milliGRAM(s) Oral before breakfast  perphenazine 4 milliGRAM(s) Oral three times a day  polyethylene glycol 3350 17 Gram(s) Oral daily  simvastatin 5 milliGRAM(s) Oral at bedtime  tamsulosin 0.4 milliGRAM(s) Oral at bedtime    MEDICATIONS  (PRN):      Allergies    Zyprexa (Dystonic RXN)      Social History:  Drinks about 2 beers/2-3 days, denies smoking/drugs (2020 22:54)      FAMILY HISTORY:  No pertinent family history in first degree relatives      Vital Signs Last 24 Hrs  T(C): 36.3 (2020 04:39), Max: 36.8 (2020 17:22)  T(F): 97.4 (2020 04:39), Max: 98.3 (2020 17:22)  HR: 64 (2020 04:39) (64 - 98)  BP: 105/56 (2020 04:39) (103/67 - 121/63)  BP(mean): --  RR: 16 (2020 04:39) (16 - 17)  SpO2: 100% (2020 04:39) (97% - 100%)    Physical Exam:  Vital Signs Last 24 Hrs  T(C): 36.3 (2020 04:39), Max: 36.8 (2020 17:22)  T(F): 97.4 (2020 04:39), Max: 98.3 (2020 17:22)  HR: 64 (2020 04:39) (64 - 98)  BP: 105/56 (2020 04:39) (103/67 - 121/63)  BP(mean): --  RR: 16 (2020 04:39) (16 - 17)  SpO2: 100% (2020 04:39) (97% - 100%)    General:  A&Ox3,Appears stated age, No acute distress,  Head: NC/AT  EENT: PERRLA. EOMI. Conjunctiva and sclera clear. Pharynx clear.  Neck: Supple. No JVD  Lungs: CTA B/l. Nonlabored Respirations  CV: +S1S2, RRR  Abdomen: Soft, Nondistended,  Nontender, no guarding, no rebound  : mckeon with clear urine in bag   Extremities: Warm and well perfused. 2+ peripheral pulses b/l. Calf soft, nontender b/l. No pedal edema.      LABS:                        13.2   7.07  )-----------( 268      ( 2020 18:06 )             41.3     -    137  |  104  |  12  ----------------------------<  86  4.0   |  25  |  0.93    Ca    8.8      2020 18:06    TPro  7.2  /  Alb  3.9  /  TBili  0.2  /  DBili  x   /  AST  14  /  ALT  24  /  AlkPhos  114  -    LIVER FUNCTIONS - ( 2020 18:06 )  Alb: 3.9 g/dL / Pro: 7.2 g/dL / ALK PHOS: 114 U/L / ALT: 24 U/L DA / AST: 14 U/L / GGT: x             Urinalysis Basic - ( 2020 21:24 )    Color: Yellow / Appearance: Clear / S.015 / pH: x  Gluc: x / Ketone: Negative  / Bili: Negative / Urobili: Negative   Blood: x / Protein: Negative / Nitrite: Negative   Leuk Esterase: Negative / RBC: 0-2 /HPF / WBC 0-2 /HPF   Sq Epi: x / Non Sq Epi: Few /HPF / Bacteria: Trace /HPF        RADIOLOGY & ADDITIONAL STUDIES:    EXAM:  US KIDNEY(S)                            PROCEDURE DATE:  2020          INTERPRETATION:  CLINICAL INFORMATION: Urinary retention of unknown severity. Evaluate for mass. No additional information is provided.    COMPARISON: None available.    TECHNIQUE: Sonography of the kidneys and bladder.     FINDINGS:    Right kidney:  10.1 cm. No renal mass, hydronephrosis or calculi.    Left kidney:  10.6 cm. No renal mass, hydronephrosis or calculi.    Urinary bladder: A Mckeon catheter is noted in a decompressed bladder.    IMPRESSION:     No renal calculus or gross hydronephrosis is appreciated.                      PETRONA MOONEY M.D., ATTENDING RADIOLOGIST  This document has been electronically signed. 2020  2:43PM

## 2020-06-28 LAB
ALBUMIN SERPL ELPH-MCNC: 3.6 G/DL — SIGNIFICANT CHANGE UP (ref 3.5–5)
ALP SERPL-CCNC: 118 U/L — SIGNIFICANT CHANGE UP (ref 40–120)
ALT FLD-CCNC: 21 U/L DA — SIGNIFICANT CHANGE UP (ref 10–60)
ANION GAP SERPL CALC-SCNC: 9 MMOL/L — SIGNIFICANT CHANGE UP (ref 5–17)
AST SERPL-CCNC: 9 U/L — LOW (ref 10–40)
BASOPHILS # BLD AUTO: 0.02 K/UL — SIGNIFICANT CHANGE UP (ref 0–0.2)
BASOPHILS NFR BLD AUTO: 0.3 % — SIGNIFICANT CHANGE UP (ref 0–2)
BILIRUB SERPL-MCNC: 0.2 MG/DL — SIGNIFICANT CHANGE UP (ref 0.2–1.2)
BUN SERPL-MCNC: 8 MG/DL — SIGNIFICANT CHANGE UP (ref 7–18)
CALCIUM SERPL-MCNC: 9.4 MG/DL — SIGNIFICANT CHANGE UP (ref 8.4–10.5)
CHLORIDE SERPL-SCNC: 105 MMOL/L — SIGNIFICANT CHANGE UP (ref 96–108)
CO2 SERPL-SCNC: 26 MMOL/L — SIGNIFICANT CHANGE UP (ref 22–31)
CREAT SERPL-MCNC: 0.91 MG/DL — SIGNIFICANT CHANGE UP (ref 0.5–1.3)
CULTURE RESULTS: NO GROWTH — SIGNIFICANT CHANGE UP
EOSINOPHIL # BLD AUTO: 0.12 K/UL — SIGNIFICANT CHANGE UP (ref 0–0.5)
EOSINOPHIL NFR BLD AUTO: 1.7 % — SIGNIFICANT CHANGE UP (ref 0–6)
GLUCOSE SERPL-MCNC: 85 MG/DL — SIGNIFICANT CHANGE UP (ref 70–99)
HCT VFR BLD CALC: 44.4 % — SIGNIFICANT CHANGE UP (ref 39–50)
HGB BLD-MCNC: 13.9 G/DL — SIGNIFICANT CHANGE UP (ref 13–17)
IMM GRANULOCYTES NFR BLD AUTO: 0.1 % — SIGNIFICANT CHANGE UP (ref 0–1.5)
LYMPHOCYTES # BLD AUTO: 1.92 K/UL — SIGNIFICANT CHANGE UP (ref 1–3.3)
LYMPHOCYTES # BLD AUTO: 27.2 % — SIGNIFICANT CHANGE UP (ref 13–44)
MAGNESIUM SERPL-MCNC: 2.2 MG/DL — SIGNIFICANT CHANGE UP (ref 1.6–2.6)
MCHC RBC-ENTMCNC: 24.3 PG — LOW (ref 27–34)
MCHC RBC-ENTMCNC: 31.3 GM/DL — LOW (ref 32–36)
MCV RBC AUTO: 77.8 FL — LOW (ref 80–100)
MONOCYTES # BLD AUTO: 0.46 K/UL — SIGNIFICANT CHANGE UP (ref 0–0.9)
MONOCYTES NFR BLD AUTO: 6.5 % — SIGNIFICANT CHANGE UP (ref 2–14)
NEUTROPHILS # BLD AUTO: 4.54 K/UL — SIGNIFICANT CHANGE UP (ref 1.8–7.4)
NEUTROPHILS NFR BLD AUTO: 64.2 % — SIGNIFICANT CHANGE UP (ref 43–77)
NRBC # BLD: 0 /100 WBCS — SIGNIFICANT CHANGE UP (ref 0–0)
PHOSPHATE SERPL-MCNC: 4.7 MG/DL — HIGH (ref 2.5–4.5)
PLATELET # BLD AUTO: 290 K/UL — SIGNIFICANT CHANGE UP (ref 150–400)
POTASSIUM SERPL-MCNC: 4 MMOL/L — SIGNIFICANT CHANGE UP (ref 3.5–5.3)
POTASSIUM SERPL-SCNC: 4 MMOL/L — SIGNIFICANT CHANGE UP (ref 3.5–5.3)
PROT SERPL-MCNC: 7 G/DL — SIGNIFICANT CHANGE UP (ref 6–8.3)
RBC # BLD: 5.71 M/UL — SIGNIFICANT CHANGE UP (ref 4.2–5.8)
RBC # FLD: 14.7 % — HIGH (ref 10.3–14.5)
SODIUM SERPL-SCNC: 140 MMOL/L — SIGNIFICANT CHANGE UP (ref 135–145)
SPECIMEN SOURCE: SIGNIFICANT CHANGE UP
WBC # BLD: 7.07 K/UL — SIGNIFICANT CHANGE UP (ref 3.8–10.5)
WBC # FLD AUTO: 7.07 K/UL — SIGNIFICANT CHANGE UP (ref 3.8–10.5)

## 2020-06-28 PROCEDURE — 76775 US EXAM ABDO BACK WALL LIM: CPT | Mod: 26

## 2020-06-28 RX ADMIN — CEFTRIAXONE 100 MILLIGRAM(S): 500 INJECTION, POWDER, FOR SOLUTION INTRAMUSCULAR; INTRAVENOUS at 17:28

## 2020-06-28 RX ADMIN — Medication 50 MICROGRAM(S): at 06:19

## 2020-06-28 RX ADMIN — FINASTERIDE 5 MILLIGRAM(S): 5 TABLET, FILM COATED ORAL at 12:13

## 2020-06-28 RX ADMIN — DIVALPROEX SODIUM 500 MILLIGRAM(S): 500 TABLET, DELAYED RELEASE ORAL at 17:28

## 2020-06-28 RX ADMIN — Medication 10 MILLIGRAM(S): at 13:40

## 2020-06-28 RX ADMIN — SIMVASTATIN 5 MILLIGRAM(S): 20 TABLET, FILM COATED ORAL at 21:51

## 2020-06-28 RX ADMIN — PERPHENAZINE 4 MILLIGRAM(S): 8 TABLET, FILM COATED ORAL at 13:40

## 2020-06-28 RX ADMIN — PANTOPRAZOLE SODIUM 40 MILLIGRAM(S): 20 TABLET, DELAYED RELEASE ORAL at 06:20

## 2020-06-28 RX ADMIN — OXCARBAZEPINE 300 MILLIGRAM(S): 300 TABLET, FILM COATED ORAL at 17:28

## 2020-06-28 RX ADMIN — LITHIUM CARBONATE 150 MILLIGRAM(S): 300 TABLET, EXTENDED RELEASE ORAL at 06:19

## 2020-06-28 RX ADMIN — Medication 1 MILLIGRAM(S): at 17:28

## 2020-06-28 RX ADMIN — Medication 30 MILLIGRAM(S): at 12:13

## 2020-06-28 RX ADMIN — Medication 0.1 MILLIGRAM(S): at 21:51

## 2020-06-28 RX ADMIN — Medication 10 MILLIGRAM(S): at 21:51

## 2020-06-28 RX ADMIN — OXCARBAZEPINE 300 MILLIGRAM(S): 300 TABLET, FILM COATED ORAL at 06:19

## 2020-06-28 RX ADMIN — BUDESONIDE AND FORMOTEROL FUMARATE DIHYDRATE 2 PUFF(S): 160; 4.5 AEROSOL RESPIRATORY (INHALATION) at 09:05

## 2020-06-28 RX ADMIN — DIVALPROEX SODIUM 500 MILLIGRAM(S): 500 TABLET, DELAYED RELEASE ORAL at 06:19

## 2020-06-28 RX ADMIN — LIDOCAINE 1 APPLICATION(S): 4 CREAM TOPICAL at 17:25

## 2020-06-28 RX ADMIN — GABAPENTIN 800 MILLIGRAM(S): 400 CAPSULE ORAL at 21:53

## 2020-06-28 RX ADMIN — POLYETHYLENE GLYCOL 3350 17 GRAM(S): 17 POWDER, FOR SOLUTION ORAL at 12:13

## 2020-06-28 RX ADMIN — Medication 100 MILLIGRAM(S): at 06:19

## 2020-06-28 RX ADMIN — LITHIUM CARBONATE 150 MILLIGRAM(S): 300 TABLET, EXTENDED RELEASE ORAL at 17:28

## 2020-06-28 RX ADMIN — GABAPENTIN 800 MILLIGRAM(S): 400 CAPSULE ORAL at 13:40

## 2020-06-28 RX ADMIN — Medication 1 MILLIGRAM(S): at 08:44

## 2020-06-28 RX ADMIN — Medication 100 MILLIGRAM(S): at 17:28

## 2020-06-28 RX ADMIN — TAMSULOSIN HYDROCHLORIDE 0.4 MILLIGRAM(S): 0.4 CAPSULE ORAL at 21:51

## 2020-06-28 RX ADMIN — BUDESONIDE AND FORMOTEROL FUMARATE DIHYDRATE 2 PUFF(S): 160; 4.5 AEROSOL RESPIRATORY (INHALATION) at 21:50

## 2020-06-28 RX ADMIN — PERPHENAZINE 4 MILLIGRAM(S): 8 TABLET, FILM COATED ORAL at 21:51

## 2020-06-28 RX ADMIN — LIDOCAINE 1 APPLICATION(S): 4 CREAM TOPICAL at 06:20

## 2020-06-28 NOTE — PROGRESS NOTE ADULT - SUBJECTIVE AND OBJECTIVE BOX
Patient is a 30y old  Male who presents with a chief complaint of Dysuria (27 Jun 2020 15:48)    PATIENT IS SEEN AND EXAMINED IN MEDICAL FLOOR.    ALLERGIES:  Zyprexa (Dystonic RXN)    VITALS:    Vital Signs Last 24 Hrs  T(C): 36.8 (28 Jun 2020 15:58), Max: 36.8 (28 Jun 2020 15:58)  T(F): 98.3 (28 Jun 2020 15:58), Max: 98.3 (28 Jun 2020 15:58)  HR: 70 (28 Jun 2020 15:58) (58 - 79)  BP: 114/59 (28 Jun 2020 15:58) (110/58 - 118/68)  BP(mean): --  RR: 16 (28 Jun 2020 15:58) (16 - 18)  SpO2: 97% (28 Jun 2020 15:58) (96% - 97%)    LABS:    CBC Full  -  ( 28 Jun 2020 06:50 )  WBC Count : 7.07 K/uL  RBC Count : 5.71 M/uL  Hemoglobin : 13.9 g/dL  Hematocrit : 44.4 %  Platelet Count - Automated : 290 K/uL  Mean Cell Volume : 77.8 fl  Mean Cell Hemoglobin : 24.3 pg  Mean Cell Hemoglobin Concentration : 31.3 gm/dL  Auto Neutrophil # : 4.54 K/uL  Auto Lymphocyte # : 1.92 K/uL  Auto Monocyte # : 0.46 K/uL  Auto Eosinophil # : 0.12 K/uL  Auto Basophil # : 0.02 K/uL  Auto Neutrophil % : 64.2 %  Auto Lymphocyte % : 27.2 %  Auto Monocyte % : 6.5 %  Auto Eosinophil % : 1.7 %  Auto Basophil % : 0.3 %    06-28    140  |  105  |  8   ----------------------------<  85  4.0   |  26  |  0.91    Ca    9.4      28 Jun 2020 06:50  Phos  4.7     06-28  Mg     2.2     06-28    TPro  7.0  /  Alb  3.6  /  TBili  0.2  /  DBili  x   /  AST  9<L>  /  ALT  21  /  AlkPhos  118  06-28    CAPILLARY BLOOD GLUCOSE    LIVER FUNCTIONS - ( 28 Jun 2020 06:50 )  Alb: 3.6 g/dL / Pro: 7.0 g/dL / ALK PHOS: 118 U/L / ALT: 21 U/L DA / AST: 9 U/L / GGT: x           Creatinine Trend: 0.91<--, 0.84<--, 0.78<--, 0.93<--, 0.74<--, 0.65<--  I&O's Summary    27 Jun 2020 07:01 - 28 Jun 2020 07:00  --------------------------------------------------------  IN: 0 mL / OUT: 1200 mL / NET: -1200 mL    28 Jun 2020 07:01 - 28 Jun 2020 17:35  --------------------------------------------------------  IN: 0 mL / OUT: 950 mL / NET: -950 mL    .Urine Clean Catch (Midstream)  06-24 @ 18:47   <10,000 CFU/mL Normal Urogenital Sarah  --  --    MEDICATIONS:    MEDICATIONS  (STANDING):  amantadine 100 milliGRAM(s) Oral two times a day  baclofen 10 milliGRAM(s) Oral three times a day  benztropine 1 milliGRAM(s) Oral two times a day  budesonide  80 MICROgram(s)/formoterol 4.5 MICROgram(s) Inhaler 2 Puff(s) Inhalation two times a day  cefTRIAXone   IVPB 1000 milliGRAM(s) IV Intermittent every 24 hours  cloNIDine 0.1 milliGRAM(s) Oral at bedtime  diVALproex  milliGRAM(s) Oral two times a day  ergocalciferol 41529 Unit(s) Oral every week  finasteride 5 milliGRAM(s) Oral daily  FLUoxetine 30 milliGRAM(s) Oral daily  gabapentin 800 milliGRAM(s) Oral three times a day  levothyroxine 50 MICROGram(s) Oral daily  lidocaine 2% Gel 1 Application(s) Topical two times a day  lithium 150 milliGRAM(s) Oral two times a day  LORazepam     Tablet 1 milliGRAM(s) Oral two times a day  OXcarbazepine 300 milliGRAM(s) Oral two times a day  pantoprazole    Tablet 40 milliGRAM(s) Oral before breakfast  perphenazine 4 milliGRAM(s) Oral three times a day  polyethylene glycol 3350 17 Gram(s) Oral daily  simvastatin 5 milliGRAM(s) Oral at bedtime  sodium chloride 0.9%. 1000 milliLiter(s) (75 mL/Hr) IV Continuous <Continuous>  tamsulosin 0.4 milliGRAM(s) Oral at bedtime      MEDICATIONS  (PRN):      REVIEW OF SYSTEMS:                           ALL ROS DONE [ X   ]    CONSTITUTIONAL:  LETHARGIC [   ], FEVER [   ], UNRESPONSIVE [   ]  CVS:  CP  [   ], SOB, [   ], PALPITATIONS [   ], DIZZYNESS [   ]  RS: COUGH [   ], SPUTUM [   ]  GI: ABDOMINAL PAIN [   ], NAUSEA [   ], VOMITINGS [   ], DIARRHEA [   ], CONSTIPATION [   ]  :  DYSURIA [   ], NOCTURIA [   ], INCREASED FREQUENCY [   ], DRIBLING [   ],  SKELETAL: PAINFUL JOINTS [   ], SWOLLEN JOINTS [   ], NECK ACHE [   ], LOW BACK ACHE [   ],  SKIN : ULCERS [   ], RASH [   ], ITCHING [   ]  CNS: HEAD ACHE [   ], DOUBLE VISION [   ], BLURRED VISION [   ], AMS / CONFUSION [   ], SEIZURES [   ], WEAKNESS [   ],TINGLING / NUMBNESS [   ]    PHYSICAL EXAMINATION:  GENERAL APPEARANCE: NO DISTRESS  HEENT:  NO PALLOR, NO  JVD,  NO   NODES, NECK SUPPLE  CVS: S1 +, S2 +,   RS: AEEB,  OCCASIONAL  RALES +,   NO RONCHI  ABD: SOFT, NT, NO, BS +  EXT: NO PE  SKIN: WARM,                               BENTON +  SKELETAL:  ROM ACCEPTABLE  CNS:  AAO X 2 ,   NO DEFICITS    RADIOLOGY :    < from: US Renal (06.28.20 @ 14:31) >  EXAM:  US KIDNEY(S)                            PROCEDURE DATE:  06/28/2020          INTERPRETATION:  CLINICAL INFORMATION: Urinary retention of unknown severity. Evaluate for mass. No additional information is provided.    COMPARISON: None available.    TECHNIQUE: Sonography of the kidneys and bladder.     FINDINGS:    Right kidney:  10.1 cm. No renal mass, hydronephrosis or calculi.    Left kidney:  10.6 cm. No renal mass, hydronephrosis or calculi.    Urinary bladder: A Benton catheter is noted in a decompressed bladder.    IMPRESSION:     No renal calculus or gross hydronephrosis is appreciated.    < end of copied text >      ASSESSMENT :     Retention of urine  Mitral valve disease  Factitious disorder  GERD (gastroesophageal reflux disease)  Asthma  Intellectual disability  Impulse control disorder  Mood disorder  No pertinent past medical history  No significant past surgical history      PLAN:  HPI:  30M from long-term, PMH of mood disorder, intellectual disability, factitious disorder, asthma, GERD, hypothyroidism, p/w burning sensation of urine. Pt states he had sexual intercourse yesterday with his fiance and has had dysuria since then. He states he has been having dysuria with occasional blood in urine for 2 months, specially after sexual intercourse. He reports only one partner and uses condom as contraceptive measures. He denies any abdominal pain, penile discharge, fever, nausea, vomiting, joint pain, rash. (26 Jun 2020 22:54)      # URINARY RETENTION W/ DYSURIA - SUSPECT BPH, EMPIRICALLY TREATED FOR STI - UCX FROM 06/24 IS NEGATIVE [REPEAT ORDERED], D/C ROCEPHIN, RENAL U/S AND PSA WNL, F/U TRANSRECTAL U/S, CONTINUE FLOMAX, UROLOGY CONSULT IN PROGRESS  # MOOD DISORDER   # HYPOTHYROIDISM  # ASTHMA  # HTN  # SOCIAL WORK CONSULT TO ARRANGE FOR TRANSFER BACK TO GROUP HOME ONCE MEDICALLY STABLE  # GI AND DVT PPX    KHOA DOCKERY M.D. COVERING FOR MARYCRUZ DOCKERY M.D.

## 2020-06-29 ENCOUNTER — TRANSCRIPTION ENCOUNTER (OUTPATIENT)
Age: 30
End: 2020-06-29

## 2020-06-29 DIAGNOSIS — Z02.9 ENCOUNTER FOR ADMINISTRATIVE EXAMINATIONS, UNSPECIFIED: ICD-10-CM

## 2020-06-29 DIAGNOSIS — Z71.89 OTHER SPECIFIED COUNSELING: ICD-10-CM

## 2020-06-29 LAB
ALBUMIN SERPL ELPH-MCNC: 3.7 G/DL — SIGNIFICANT CHANGE UP (ref 3.5–5)
ALP SERPL-CCNC: 120 U/L — SIGNIFICANT CHANGE UP (ref 40–120)
ALT FLD-CCNC: 20 U/L DA — SIGNIFICANT CHANGE UP (ref 10–60)
ANION GAP SERPL CALC-SCNC: 8 MMOL/L — SIGNIFICANT CHANGE UP (ref 5–17)
AST SERPL-CCNC: 6 U/L — LOW (ref 10–40)
BASOPHILS # BLD AUTO: 0.02 K/UL — SIGNIFICANT CHANGE UP (ref 0–0.2)
BASOPHILS NFR BLD AUTO: 0.2 % — SIGNIFICANT CHANGE UP (ref 0–2)
BILIRUB SERPL-MCNC: 0.3 MG/DL — SIGNIFICANT CHANGE UP (ref 0.2–1.2)
BUN SERPL-MCNC: 12 MG/DL — SIGNIFICANT CHANGE UP (ref 7–18)
CALCIUM SERPL-MCNC: 9.5 MG/DL — SIGNIFICANT CHANGE UP (ref 8.4–10.5)
CHLORIDE SERPL-SCNC: 103 MMOL/L — SIGNIFICANT CHANGE UP (ref 96–108)
CO2 SERPL-SCNC: 30 MMOL/L — SIGNIFICANT CHANGE UP (ref 22–31)
CREAT SERPL-MCNC: 0.95 MG/DL — SIGNIFICANT CHANGE UP (ref 0.5–1.3)
EOSINOPHIL # BLD AUTO: 0.14 K/UL — SIGNIFICANT CHANGE UP (ref 0–0.5)
EOSINOPHIL NFR BLD AUTO: 1.7 % — SIGNIFICANT CHANGE UP (ref 0–6)
GLUCOSE SERPL-MCNC: 83 MG/DL — SIGNIFICANT CHANGE UP (ref 70–99)
HCT VFR BLD CALC: 46.6 % — SIGNIFICANT CHANGE UP (ref 39–50)
HGB BLD-MCNC: 14.4 G/DL — SIGNIFICANT CHANGE UP (ref 13–17)
IMM GRANULOCYTES NFR BLD AUTO: 0.2 % — SIGNIFICANT CHANGE UP (ref 0–1.5)
LYMPHOCYTES # BLD AUTO: 2.26 K/UL — SIGNIFICANT CHANGE UP (ref 1–3.3)
LYMPHOCYTES # BLD AUTO: 28 % — SIGNIFICANT CHANGE UP (ref 13–44)
MAGNESIUM SERPL-MCNC: 2.4 MG/DL — SIGNIFICANT CHANGE UP (ref 1.6–2.6)
MCHC RBC-ENTMCNC: 23.8 PG — LOW (ref 27–34)
MCHC RBC-ENTMCNC: 30.9 GM/DL — LOW (ref 32–36)
MCV RBC AUTO: 77.2 FL — LOW (ref 80–100)
MONOCYTES # BLD AUTO: 0.52 K/UL — SIGNIFICANT CHANGE UP (ref 0–0.9)
MONOCYTES NFR BLD AUTO: 6.4 % — SIGNIFICANT CHANGE UP (ref 2–14)
NEUTROPHILS # BLD AUTO: 5.11 K/UL — SIGNIFICANT CHANGE UP (ref 1.8–7.4)
NEUTROPHILS NFR BLD AUTO: 63.5 % — SIGNIFICANT CHANGE UP (ref 43–77)
NRBC # BLD: 0 /100 WBCS — SIGNIFICANT CHANGE UP (ref 0–0)
PHOSPHATE SERPL-MCNC: 4.9 MG/DL — HIGH (ref 2.5–4.5)
PLATELET # BLD AUTO: 273 K/UL — SIGNIFICANT CHANGE UP (ref 150–400)
POTASSIUM SERPL-MCNC: 4.1 MMOL/L — SIGNIFICANT CHANGE UP (ref 3.5–5.3)
POTASSIUM SERPL-SCNC: 4.1 MMOL/L — SIGNIFICANT CHANGE UP (ref 3.5–5.3)
PROT SERPL-MCNC: 7.1 G/DL — SIGNIFICANT CHANGE UP (ref 6–8.3)
RBC # BLD: 6.04 M/UL — HIGH (ref 4.2–5.8)
RBC # FLD: 14.7 % — HIGH (ref 10.3–14.5)
SODIUM SERPL-SCNC: 141 MMOL/L — SIGNIFICANT CHANGE UP (ref 135–145)
WBC # BLD: 8.07 K/UL — SIGNIFICANT CHANGE UP (ref 3.8–10.5)
WBC # FLD AUTO: 8.07 K/UL — SIGNIFICANT CHANGE UP (ref 3.8–10.5)

## 2020-06-29 PROCEDURE — 99223 1ST HOSP IP/OBS HIGH 75: CPT

## 2020-06-29 RX ORDER — HALOPERIDOL DECANOATE 100 MG/ML
5 INJECTION INTRAMUSCULAR ONCE
Refills: 0 | Status: COMPLETED | OUTPATIENT
Start: 2020-06-29 | End: 2020-06-29

## 2020-06-29 RX ADMIN — Medication 2 MILLIGRAM(S): at 22:13

## 2020-06-29 RX ADMIN — Medication 30 MILLIGRAM(S): at 11:44

## 2020-06-29 RX ADMIN — HALOPERIDOL DECANOATE 5 MILLIGRAM(S): 100 INJECTION INTRAMUSCULAR at 22:13

## 2020-06-29 RX ADMIN — Medication 100 MILLIGRAM(S): at 06:14

## 2020-06-29 RX ADMIN — PERPHENAZINE 4 MILLIGRAM(S): 8 TABLET, FILM COATED ORAL at 13:27

## 2020-06-29 RX ADMIN — LIDOCAINE 1 APPLICATION(S): 4 CREAM TOPICAL at 17:25

## 2020-06-29 RX ADMIN — BUDESONIDE AND FORMOTEROL FUMARATE DIHYDRATE 2 PUFF(S): 160; 4.5 AEROSOL RESPIRATORY (INHALATION) at 21:16

## 2020-06-29 RX ADMIN — GABAPENTIN 800 MILLIGRAM(S): 400 CAPSULE ORAL at 13:27

## 2020-06-29 RX ADMIN — DIVALPROEX SODIUM 500 MILLIGRAM(S): 500 TABLET, DELAYED RELEASE ORAL at 17:25

## 2020-06-29 RX ADMIN — GABAPENTIN 800 MILLIGRAM(S): 400 CAPSULE ORAL at 21:16

## 2020-06-29 RX ADMIN — TAMSULOSIN HYDROCHLORIDE 0.4 MILLIGRAM(S): 0.4 CAPSULE ORAL at 21:16

## 2020-06-29 RX ADMIN — Medication 50 MICROGRAM(S): at 06:14

## 2020-06-29 RX ADMIN — BUDESONIDE AND FORMOTEROL FUMARATE DIHYDRATE 2 PUFF(S): 160; 4.5 AEROSOL RESPIRATORY (INHALATION) at 11:45

## 2020-06-29 RX ADMIN — Medication 10 MILLIGRAM(S): at 06:15

## 2020-06-29 RX ADMIN — FINASTERIDE 5 MILLIGRAM(S): 5 TABLET, FILM COATED ORAL at 11:45

## 2020-06-29 RX ADMIN — SIMVASTATIN 5 MILLIGRAM(S): 20 TABLET, FILM COATED ORAL at 21:16

## 2020-06-29 RX ADMIN — CEFTRIAXONE 100 MILLIGRAM(S): 500 INJECTION, POWDER, FOR SOLUTION INTRAMUSCULAR; INTRAVENOUS at 17:24

## 2020-06-29 RX ADMIN — Medication 1 MILLIGRAM(S): at 17:25

## 2020-06-29 RX ADMIN — LIDOCAINE 1 APPLICATION(S): 4 CREAM TOPICAL at 06:19

## 2020-06-29 RX ADMIN — LITHIUM CARBONATE 150 MILLIGRAM(S): 300 TABLET, EXTENDED RELEASE ORAL at 17:24

## 2020-06-29 RX ADMIN — Medication 10 MILLIGRAM(S): at 21:16

## 2020-06-29 RX ADMIN — POLYETHYLENE GLYCOL 3350 17 GRAM(S): 17 POWDER, FOR SOLUTION ORAL at 11:44

## 2020-06-29 RX ADMIN — Medication 1 MILLIGRAM(S): at 06:15

## 2020-06-29 RX ADMIN — PERPHENAZINE 4 MILLIGRAM(S): 8 TABLET, FILM COATED ORAL at 06:14

## 2020-06-29 RX ADMIN — PANTOPRAZOLE SODIUM 40 MILLIGRAM(S): 20 TABLET, DELAYED RELEASE ORAL at 06:14

## 2020-06-29 RX ADMIN — Medication 10 MILLIGRAM(S): at 13:27

## 2020-06-29 RX ADMIN — OXCARBAZEPINE 300 MILLIGRAM(S): 300 TABLET, FILM COATED ORAL at 06:14

## 2020-06-29 RX ADMIN — OXCARBAZEPINE 300 MILLIGRAM(S): 300 TABLET, FILM COATED ORAL at 17:24

## 2020-06-29 RX ADMIN — PERPHENAZINE 4 MILLIGRAM(S): 8 TABLET, FILM COATED ORAL at 21:16

## 2020-06-29 RX ADMIN — Medication 1 MILLIGRAM(S): at 06:18

## 2020-06-29 RX ADMIN — LITHIUM CARBONATE 150 MILLIGRAM(S): 300 TABLET, EXTENDED RELEASE ORAL at 06:15

## 2020-06-29 RX ADMIN — Medication 100 MILLIGRAM(S): at 17:25

## 2020-06-29 RX ADMIN — DIVALPROEX SODIUM 500 MILLIGRAM(S): 500 TABLET, DELAYED RELEASE ORAL at 06:14

## 2020-06-29 RX ADMIN — GABAPENTIN 800 MILLIGRAM(S): 400 CAPSULE ORAL at 06:18

## 2020-06-29 NOTE — DISCHARGE NOTE PROVIDER - NSDCCAREPROVSEEN_GEN_ALL_CORE_FT
Carri Nicole # URINARY RETENTION W/ DYSURIA - SUSPECT BPH, EMPIRICALLY TREATED FOR STI - UCX FROM 06/24 IS NEGATIVE [REPEAT ORDERED] SO D/C ROCEPHIN. RENAL U/S AND PSA WNL,  CONTINUE FLOMAX, UROLOGY CONSULT IN PROGRESS. PATIENT PASSED TRIAL OF VOID. PLAN FOR OUTPATIENT UROLOGY FOLLOWUP.  # MOOD DISORDER   # HYPOTHYROIDISM  # ASTHMA  # HTN  # SOCIAL WORK CONSULT TO ARRANGED FOR TRANSFER BACK TO GROUP HOME  # CASE DISCUSSED EXTENSIVELY WITH PATIENT AND PATIENT'S MOTHER    KHOA DOCKERY M.D. COVERING FOR MARYCRUZ DOCKERY M.D.

## 2020-06-29 NOTE — DISCHARGE NOTE PROVIDER - CARE PROVIDER_API CALL
Gus Birmingham  [facility]  [address] [place] [address]  Phone: [phone]  Fax: [phone]  Established Patient  Follow Up Time: 2 weeks    Andreas Pena  UROLOGY  [address] [address] [place] [address]  Phone: [phone]  Fax: [phone]  Follow Up Time: 1 week

## 2020-06-29 NOTE — PROGRESS NOTE ADULT - PROBLEM SELECTOR PLAN 1
Continue with Sarabia  TOV in AM  Pt takes prazosin at home  Continue with Flomax  UA negative  HIV negative  Urology following

## 2020-06-29 NOTE — PROGRESS NOTE ADULT - PROBLEM SELECTOR PLAN 4
Controlled  Pt takes Clonidine at home  Continue with home regimen with parameters  Continue to monitor BP   Continue with DASH diet

## 2020-06-29 NOTE — PROGRESS NOTE ADULT - SUBJECTIVE AND OBJECTIVE BOX
HPI:  30M from [facility], PMH of mood disorder, intellectual disability, factitious disorder, asthma, GERD, hypothyroidism, p/w burning sensation of urine. Pt states he had sexual intercourse yesterday with his fiance and has had dysuria since then. He states he has been having dysuria with occasional blood in urine for 2 months, specially after sexual intercourse.  Patient examined at bedside, resting comfortably, denies pain, SOB, NVD or dysuria.  NAD    OVERNIGHT EVENTS:  No new overnight events     REVIEW OF SYSTEMS:      CONSTITUTIONAL: No fever,   EYES: no acute visual disturbances  NECK: No pain or stiffness  RESPIRATORY: No cough; No shortness of breath  CARDIOVASCULAR: No chest pain, no palpitations  GASTROINTESTINAL: No pain. No nausea, vomiting or diarrhea   NEUROLOGICAL: No headache or numbness, no tremors  MUSCULOSKELETAL: No joint pain, no muscle pain  GENITOURINARY: no dysuria, no frequency, no hesitancy  PSYCHIATRY: no depression , no anxiety  ALL OTHER  ROS negative        Vital Signs Last 24 Hrs  T(C): 36.6 (29 Jun 2020 07:40), Max: 36.6 (29 Jun 2020 07:40)  T(F): 97.9 (29 Jun 2020 07:40), Max: 97.9 (29 Jun 2020 07:40)  HR: 63 (29 Jun 2020 07:40) (63 - 63)  BP: 102/62 (29 Jun 2020 07:40) (102/62 - 102/62)  BP(mean): --  RR: 18 (29 Jun 2020 07:40) (18 - 18)  SpO2: 97% (29 Jun 2020 07:40) (97% - 97%)    ________________________________________________  PHYSICAL EXAM:    GENERAL: NAD  HEENT: Normocephalic; conjunctivae and sclerae clear; moist mucous membranes;   NECK : supple, no JVD  CHEST/LUNG: Clear to auscultation bilaterally   HEART: S1 S2  regular  ABDOMEN: Soft, Nontender, Nondistended; Bowel sounds present  EXTREMITIES: no cyanosis; no LE edema; no calf tenderness  SKIN: warm and dry; no rash  NERVOUS SYSTEM:  Alert & Oriented x3; no new deficits    _________________________________________________  CURRENT MEDICATIONS:    MEDICATIONS  (STANDING):  amantadine 100 milliGRAM(s) Oral two times a day  baclofen 10 milliGRAM(s) Oral three times a day  benztropine 1 milliGRAM(s) Oral two times a day  budesonide  80 MICROgram(s)/formoterol 4.5 MICROgram(s) Inhaler 2 Puff(s) Inhalation two times a day  cefTRIAXone   IVPB 1000 milliGRAM(s) IV Intermittent every 24 hours  cloNIDine 0.1 milliGRAM(s) Oral at bedtime  diVALproex  milliGRAM(s) Oral two times a day  ergocalciferol 22568 Unit(s) Oral every week  finasteride 5 milliGRAM(s) Oral daily  FLUoxetine 30 milliGRAM(s) Oral daily  gabapentin 800 milliGRAM(s) Oral three times a day  levothyroxine 50 MICROGram(s) Oral daily  lidocaine 2% Gel 1 Application(s) Topical two times a day  lithium 150 milliGRAM(s) Oral two times a day  LORazepam     Tablet 1 milliGRAM(s) Oral two times a day  OXcarbazepine 300 milliGRAM(s) Oral two times a day  pantoprazole    Tablet 40 milliGRAM(s) Oral before breakfast  perphenazine 4 milliGRAM(s) Oral three times a day  polyethylene glycol 3350 17 Gram(s) Oral daily  simvastatin 5 milliGRAM(s) Oral at bedtime  sodium chloride 0.9%. 1000 milliLiter(s) (75 mL/Hr) IV Continuous <Continuous>  tamsulosin 0.4 milliGRAM(s) Oral at bedtime    MEDICATIONS  (PRN):      __________________________________________________  LABS:                          14.4   8.07  )-----------( 273      ( 29 Jun 2020 06:42 )             46.6     06-29    141  |  103  |  12  ----------------------------<  83  4.1   |  30  |  0.95    Ca    9.5      29 Jun 2020 06:42  Phos  4.9     06-29  Mg     2.4     06-29    TPro  7.1  /  Alb  3.7  /  TBili  0.3  /  DBili  x   /  AST  6<L>  /  ALT  20  /  AlkPhos  120  06-29        CAPILLARY BLOOD GLUCOSE          __________________________________________________  RADIOLOGY & ADDITIONAL TESTS:    Imaging Personally Reviewed:  YES    < from: US Renal (06.28.20 @ 14:31) >  IMPRESSION:     No renal calculus or gross hydronephrosis is appreciated.    < end of copied text >    Consultant(s) Notes Reviewed:   YES     Plan of care was discussed with patient and /or primary care giver; all questions and concerns were addressed and care was aligned with patient's wishes.    Plan discussed with attending and consulting physicians.

## 2020-06-29 NOTE — DISCHARGE NOTE PROVIDER - PROVIDER TOKENS
PROVIDER:[TOKEN:[[id number]],FOLLOWUP:[2 weeks],ESTABLISHEDPATIENT:[T]],PROVIDER:[TOKEN:[[id number]],FOLLOWUP:[1 week]]

## 2020-06-29 NOTE — DISCHARGE NOTE PROVIDER - NSDCCPCAREPLAN_GEN_ALL_CORE_FT
PRINCIPAL DISCHARGE DIAGNOSIS  Diagnosis: Urinary retention with incomplete bladder emptying  Assessment and Plan of Treatment: You came in for difficulty urinating.  You were found to be retaining urine.  A mckeon was placed to drain your urine while your BPH medication was adjusted.  You have since been able to urinate without the mckeon.     Please take all of your medications as prescribed.  Follow up with your medical team as directed.

## 2020-06-29 NOTE — DISCHARGE NOTE PROVIDER - NSDCMRMEDTOKEN_GEN_ALL_CORE_FT
amantadine 100 mg oral tablet: 1 tab(s) orally 2 times a day  baclofen 10 mg oral tablet: 1 tab(s) orally 3 times a day  cloNIDine 0.1 mg oral tablet: 1 tab(s) orally once a day (at bedtime)  Cogentin 1 mg oral tablet: 1 tab(s) orally 2 times a day  Depakote 500 mg oral delayed release tablet: 1 tab(s) orally 2 times a day  Dulera 100 mcg-5 mcg/inh inhalation aerosol: 2 puff(s) inhaled 2 times a day  FLUoxetine 10 mg oral capsule: 1 cap(s) orally once a day  FLUoxetine 20 mg oral capsule: 1 cap(s) orally once a day  gabapentin 800 mg oral tablet: 1 tab(s) orally 3 times a day  levothyroxine 50 mcg (0.05 mg) oral tablet: 1 tab(s) orally once a day  lithium carbonate: 150 milligram(s) orally 2 times a day  LORazepam 1 mg oral tablet: 1 tab(s) orally 2 times a day  OXcarbazepine 300 mg oral tablet: 1 tab(s) orally 2 times a day  pantoprazole 40 mg oral delayed release tablet: 1 tab(s) orally once a day  perphenazine 4 mg oral tablet: 1 tab(s) orally 3 times a day  polyethylene glycol 3350 oral powder for reconstitution: 1 each orally once a day  prazosin 2 mg oral capsule: 1 cap(s) orally 2 times a day  simvastatin 5 mg oral tablet: 1 tab(s) orally once a day (at bedtime)  Vitamin D2 50,000 intl units (1.25 mg) oral capsule: 1 cap(s) orally once a week amantadine 100 mg oral tablet: 1 tab(s) orally 2 times a day  baclofen 10 mg oral tablet: 1 tab(s) orally 3 times a day  cloNIDine 0.1 mg oral tablet: 1 tab(s) orally once a day (at bedtime)  Cogentin 1 mg oral tablet: 1 tab(s) orally 2 times a day  Depakote 500 mg oral delayed release tablet: 1 tab(s) orally 2 times a day  Dulera 100 mcg-5 mcg/inh inhalation aerosol: 2 puff(s) inhaled 2 times a day  finasteride 5 mg oral tablet: 1 tab(s) orally once a day  FLUoxetine 10 mg oral capsule: 1 cap(s) orally once a day  FLUoxetine 20 mg oral capsule: 1 cap(s) orally once a day  gabapentin 800 mg oral tablet: 1 tab(s) orally 3 times a day  levothyroxine 50 mcg (0.05 mg) oral tablet: 1 tab(s) orally once a day  lithium carbonate: 150 milligram(s) orally 2 times a day  LORazepam 1 mg oral tablet: 1 tab(s) orally 2 times a day  OXcarbazepine 300 mg oral tablet: 1 tab(s) orally 2 times a day  pantoprazole 40 mg oral delayed release tablet: 1 tab(s) orally once a day  perphenazine 4 mg oral tablet: 1 tab(s) orally 3 times a day  polyethylene glycol 3350 oral powder for reconstitution: 1 each orally once a day  simvastatin 5 mg oral tablet: 1 tab(s) orally once a day (at bedtime)  tamsulosin 0.4 mg oral capsule: 1 cap(s) orally once a day (at bedtime)  Vitamin D2 50,000 intl units (1.25 mg) oral capsule: 1 cap(s) orally once a week

## 2020-06-29 NOTE — DISCHARGE NOTE PROVIDER - HOSPITAL COURSE
30M from retirement, PMH of mood disorder, intellectual disability, factitious disorder, asthma, GERD, hypothyroidism, p/w burning sensation of urine. Pt states he had sexual intercourse yesterday with his fiance and has had dysuria since then. He states he has been having dysuria with occasional blood in urine for 2 months, specially after sexual intercourse.        Found to have urinary retention, 750 ml drained from straight cath in ED.  Sarabia placed, urology consulted.        Hep B positive.  STI and Utox negative.        TOV on 6/30        NOT COMPLETE

## 2020-06-29 NOTE — PROGRESS NOTE ADULT - ATTENDING COMMENTS
HPI:  30M from [facility], PMH of mood disorder, intellectual disability, factitious disorder, asthma, GERD, hypothyroidism, p/w burning sensation of urine. Pt states he had sexual intercourse yesterday with his fiance and has had dysuria since then. He states he has been having dysuria with occasional blood in urine for 2 months, specially after sexual intercourse. He reports only one partner and uses condom as contraceptive measures. He denies any abdominal pain, penile discharge, fever, nausea, vomiting, joint pain, rash. (26 Jun 2020 22:54)      # URINARY RETENTION W/ DYSURIA - SUSPECT BPH, EMPIRICALLY TREATED FOR STI - UCX FROM 06/24 IS NEGATIVE [REPEAT ORDERED], D/C ROCEPHIN, RENAL U/S AND PSA WNL, F/U TRANSRECTAL U/S, CONTINUE FLOMAX, UROLOGY CONSULT IN PROGRESS - WILL DISCUSS TRIAL OF VOID WITH UROLOGY IN ANTICIPATION OF DISCHARGE  # MOOD DISORDER   # HYPOTHYROIDISM  # ASTHMA  # HTN  # SOCIAL WORK CONSULT TO ARRANGE FOR TRANSFER BACK TO [facility] ONCE MEDICALLY STABLE  # CASE DISCUSSED EXTENSIVELY WITH PATIENT AND PATIENT'S MOTHER  # GI AND DVT PPX    KHOA DOCKERY M.D. COVERING FOR MARYCRUZ DOCKERY M.D.

## 2020-06-30 ENCOUNTER — TRANSCRIPTION ENCOUNTER (OUTPATIENT)
Age: 30
End: 2020-06-30

## 2020-06-30 ENCOUNTER — EMERGENCY (EMERGENCY)
Facility: HOSPITAL | Age: 30
LOS: 1 days | Discharge: HOME | End: 2020-06-30
Attending: EMERGENCY MEDICINE
Payer: MEDICAID

## 2020-06-30 VITALS
DIASTOLIC BLOOD PRESSURE: 80 MMHG | HEIGHT: 68 IN | SYSTOLIC BLOOD PRESSURE: 124 MMHG | TEMPERATURE: 98 F | RESPIRATION RATE: 17 BRPM | WEIGHT: 186.07 LBS | OXYGEN SATURATION: 97 % | HEART RATE: 76 BPM

## 2020-06-30 VITALS
HEART RATE: 78 BPM | DIASTOLIC BLOOD PRESSURE: 76 MMHG | TEMPERATURE: 97 F | RESPIRATION RATE: 18 BRPM | SYSTOLIC BLOOD PRESSURE: 106 MMHG | OXYGEN SATURATION: 96 %

## 2020-06-30 LAB
ANION GAP SERPL CALC-SCNC: 8 MMOL/L — SIGNIFICANT CHANGE UP (ref 5–17)
APPEARANCE UR: CLEAR — SIGNIFICANT CHANGE UP
BACTERIA # UR AUTO: ABNORMAL /HPF
BILIRUB UR-MCNC: NEGATIVE — SIGNIFICANT CHANGE UP
BUN SERPL-MCNC: 14 MG/DL — SIGNIFICANT CHANGE UP (ref 7–18)
CALCIUM SERPL-MCNC: 9 MG/DL — SIGNIFICANT CHANGE UP (ref 8.4–10.5)
CHLORIDE SERPL-SCNC: 103 MMOL/L — SIGNIFICANT CHANGE UP (ref 96–108)
CO2 SERPL-SCNC: 29 MMOL/L — SIGNIFICANT CHANGE UP (ref 22–31)
COLOR SPEC: YELLOW — SIGNIFICANT CHANGE UP
CREAT SERPL-MCNC: 0.82 MG/DL — SIGNIFICANT CHANGE UP (ref 0.5–1.3)
DIFF PNL FLD: ABNORMAL
EPI CELLS # UR: ABNORMAL /HPF
GLUCOSE SERPL-MCNC: 86 MG/DL — SIGNIFICANT CHANGE UP (ref 70–99)
GLUCOSE UR QL: NEGATIVE — SIGNIFICANT CHANGE UP
HCT VFR BLD CALC: 45 % — SIGNIFICANT CHANGE UP (ref 39–50)
HGB BLD-MCNC: 14.3 G/DL — SIGNIFICANT CHANGE UP (ref 13–17)
KETONES UR-MCNC: NEGATIVE — SIGNIFICANT CHANGE UP
LEUKOCYTE ESTERASE UR-ACNC: ABNORMAL
MCHC RBC-ENTMCNC: 24.1 PG — LOW (ref 27–34)
MCHC RBC-ENTMCNC: 31.8 GM/DL — LOW (ref 32–36)
MCV RBC AUTO: 75.9 FL — LOW (ref 80–100)
NITRITE UR-MCNC: NEGATIVE — SIGNIFICANT CHANGE UP
NRBC # BLD: 0 /100 WBCS — SIGNIFICANT CHANGE UP (ref 0–0)
PH UR: 8 — SIGNIFICANT CHANGE UP (ref 5–8)
PLATELET # BLD AUTO: 251 K/UL — SIGNIFICANT CHANGE UP (ref 150–400)
POTASSIUM SERPL-MCNC: 4 MMOL/L — SIGNIFICANT CHANGE UP (ref 3.5–5.3)
POTASSIUM SERPL-SCNC: 4 MMOL/L — SIGNIFICANT CHANGE UP (ref 3.5–5.3)
PROT UR-MCNC: NEGATIVE — SIGNIFICANT CHANGE UP
RBC # BLD: 5.93 M/UL — HIGH (ref 4.2–5.8)
RBC # FLD: 14.6 % — HIGH (ref 10.3–14.5)
RBC CASTS # UR COMP ASSIST: ABNORMAL /HPF (ref 0–2)
SODIUM SERPL-SCNC: 140 MMOL/L — SIGNIFICANT CHANGE UP (ref 135–145)
SP GR SPEC: 1.01 — SIGNIFICANT CHANGE UP (ref 1.01–1.02)
UROBILINOGEN FLD QL: NEGATIVE — SIGNIFICANT CHANGE UP
WBC # BLD: 8.75 K/UL — SIGNIFICANT CHANGE UP (ref 3.8–10.5)
WBC # FLD AUTO: 8.75 K/UL — SIGNIFICANT CHANGE UP (ref 3.8–10.5)
WBC UR QL: SIGNIFICANT CHANGE UP /HPF (ref 0–5)

## 2020-06-30 PROCEDURE — 99285 EMERGENCY DEPT VISIT HI MDM: CPT

## 2020-06-30 PROCEDURE — 82306 VITAMIN D 25 HYDROXY: CPT

## 2020-06-30 PROCEDURE — 87086 URINE CULTURE/COLONY COUNT: CPT

## 2020-06-30 PROCEDURE — 99284 EMERGENCY DEPT VISIT MOD MDM: CPT

## 2020-06-30 PROCEDURE — U0003: CPT

## 2020-06-30 PROCEDURE — 94640 AIRWAY INHALATION TREATMENT: CPT

## 2020-06-30 PROCEDURE — 99283 EMERGENCY DEPT VISIT LOW MDM: CPT

## 2020-06-30 PROCEDURE — 76775 US EXAM ABDO BACK WALL LIM: CPT

## 2020-06-30 PROCEDURE — 76857 US EXAM PELVIC LIMITED: CPT

## 2020-06-30 PROCEDURE — 87340 HEPATITIS B SURFACE AG IA: CPT

## 2020-06-30 PROCEDURE — 82607 VITAMIN B-12: CPT

## 2020-06-30 PROCEDURE — G0103: CPT

## 2020-06-30 PROCEDURE — 87491 CHLMYD TRACH DNA AMP PROBE: CPT

## 2020-06-30 PROCEDURE — 81003 URINALYSIS AUTO W/O SCOPE: CPT

## 2020-06-30 PROCEDURE — 87591 N.GONORRHOEAE DNA AMP PROB: CPT

## 2020-06-30 PROCEDURE — 81001 URINALYSIS AUTO W/SCOPE: CPT

## 2020-06-30 PROCEDURE — 83735 ASSAY OF MAGNESIUM: CPT

## 2020-06-30 PROCEDURE — 86703 HIV-1/HIV-2 1 RESULT ANTBDY: CPT

## 2020-06-30 PROCEDURE — 83880 ASSAY OF NATRIURETIC PEPTIDE: CPT

## 2020-06-30 PROCEDURE — 85027 COMPLETE CBC AUTOMATED: CPT

## 2020-06-30 PROCEDURE — 83036 HEMOGLOBIN GLYCOSYLATED A1C: CPT

## 2020-06-30 PROCEDURE — 84443 ASSAY THYROID STIM HORMONE: CPT

## 2020-06-30 PROCEDURE — 84100 ASSAY OF PHOSPHORUS: CPT

## 2020-06-30 PROCEDURE — 86706 HEP B SURFACE ANTIBODY: CPT

## 2020-06-30 PROCEDURE — 36415 COLL VENOUS BLD VENIPUNCTURE: CPT

## 2020-06-30 PROCEDURE — 86803 HEPATITIS C AB TEST: CPT

## 2020-06-30 PROCEDURE — 86780 TREPONEMA PALLIDUM: CPT

## 2020-06-30 PROCEDURE — 80061 LIPID PANEL: CPT

## 2020-06-30 PROCEDURE — 96372 THER/PROPH/DIAG INJ SC/IM: CPT

## 2020-06-30 PROCEDURE — 80053 COMPREHEN METABOLIC PANEL: CPT

## 2020-06-30 PROCEDURE — 80048 BASIC METABOLIC PNL TOTAL CA: CPT

## 2020-06-30 RX ORDER — TAMSULOSIN HYDROCHLORIDE 0.4 MG/1
1 CAPSULE ORAL
Qty: 0 | Refills: 0 | DISCHARGE
Start: 2020-06-30

## 2020-06-30 RX ORDER — FINASTERIDE 5 MG/1
1 TABLET, FILM COATED ORAL
Qty: 0 | Refills: 0 | DISCHARGE
Start: 2020-06-30

## 2020-06-30 RX ORDER — PHENAZOPYRIDINE HCL 100 MG
200 TABLET ORAL ONCE
Refills: 0 | Status: COMPLETED | OUTPATIENT
Start: 2020-06-30 | End: 2020-06-30

## 2020-06-30 RX ORDER — PRAZOSIN HCL 2 MG
1 CAPSULE ORAL
Qty: 0 | Refills: 0 | DISCHARGE

## 2020-06-30 RX ORDER — TAMSULOSIN HYDROCHLORIDE 0.4 MG/1
1 CAPSULE ORAL
Qty: 30 | Refills: 0
Start: 2020-06-30 | End: 2020-07-29

## 2020-06-30 RX ORDER — FINASTERIDE 5 MG/1
1 TABLET, FILM COATED ORAL
Qty: 30 | Refills: 0
Start: 2020-06-30 | End: 2020-07-29

## 2020-06-30 RX ADMIN — PANTOPRAZOLE SODIUM 40 MILLIGRAM(S): 20 TABLET, DELAYED RELEASE ORAL at 06:20

## 2020-06-30 RX ADMIN — PERPHENAZINE 4 MILLIGRAM(S): 8 TABLET, FILM COATED ORAL at 06:19

## 2020-06-30 RX ADMIN — Medication 1 MILLIGRAM(S): at 06:24

## 2020-06-30 RX ADMIN — POLYETHYLENE GLYCOL 3350 17 GRAM(S): 17 POWDER, FOR SOLUTION ORAL at 12:44

## 2020-06-30 RX ADMIN — Medication 1 MILLIGRAM(S): at 06:21

## 2020-06-30 RX ADMIN — Medication 50 MICROGRAM(S): at 06:20

## 2020-06-30 RX ADMIN — Medication 100 MILLIGRAM(S): at 06:20

## 2020-06-30 RX ADMIN — FINASTERIDE 5 MILLIGRAM(S): 5 TABLET, FILM COATED ORAL at 12:45

## 2020-06-30 RX ADMIN — LITHIUM CARBONATE 150 MILLIGRAM(S): 300 TABLET, EXTENDED RELEASE ORAL at 06:20

## 2020-06-30 RX ADMIN — Medication 30 MILLIGRAM(S): at 12:44

## 2020-06-30 RX ADMIN — Medication 10 MILLIGRAM(S): at 06:20

## 2020-06-30 RX ADMIN — LIDOCAINE 1 APPLICATION(S): 4 CREAM TOPICAL at 06:20

## 2020-06-30 RX ADMIN — GABAPENTIN 800 MILLIGRAM(S): 400 CAPSULE ORAL at 06:19

## 2020-06-30 RX ADMIN — BUDESONIDE AND FORMOTEROL FUMARATE DIHYDRATE 2 PUFF(S): 160; 4.5 AEROSOL RESPIRATORY (INHALATION) at 12:42

## 2020-06-30 RX ADMIN — OXCARBAZEPINE 300 MILLIGRAM(S): 300 TABLET, FILM COATED ORAL at 06:19

## 2020-06-30 RX ADMIN — DIVALPROEX SODIUM 500 MILLIGRAM(S): 500 TABLET, DELAYED RELEASE ORAL at 06:19

## 2020-06-30 RX ADMIN — Medication 200 MILLIGRAM(S): at 22:21

## 2020-06-30 NOTE — DISCHARGE NOTE NURSING/CASE MANAGEMENT/SOCIAL WORK - PATIENT PORTAL LINK FT
You can access the FollowMyHealth Patient Portal offered by [facility] by registering at the following website: [email or web address]. By joining [facility]’s FollowMyHealth portal, you will also be able to view your health information using other applications (apps) compatible with our system.

## 2020-06-30 NOTE — ED PROVIDER NOTE - PATIENT PORTAL LINK FT
You can access the FollowMyHealth Patient Portal offered by [facility] by registering at the following website: [email or web address]. By joining [facility]’s FollowMyHealth portal, you will also be able to view your health information using other applications (apps) compatible with our system.

## 2020-06-30 NOTE — ED ADULT NURSE NOTE - NSIMPLEMENTINTERV_GEN_ALL_ED
Implemented All Universal Safety Interventions:  [place] to call system. Call bell, personal items and telephone within reach. Instruct patient to call for assistance. Room bathroom lighting operational. Non-slip footwear when patient is off stretcher. Physically safe environment: no spills, clutter or unnecessary equipment. Stretcher in lowest position, wheels locked, appropriate side rails in place.

## 2020-06-30 NOTE — ED PROVIDER NOTE - NSFOLLOWUPCLINICS_GEN_ALL_ED_FT
[facility]  Urology  [address] [place] [address]  Phone: [phone]  Fax: [phone]  Follow Up Time:

## 2020-06-30 NOTE — ED PROVIDER NOTE - NSFOLLOWUPINSTRUCTIONS_ED_ALL_ED_FT
Be aware that Pyridium that we gave you in the ER will colour your urine orange.   Take Tylenol as needed for pains/fevers.  Drink plenty of fluids.  Follow up with your doctor/Urology or in the Clinic as discussed within 1 week.  Return to the ER for any concerns.

## 2020-06-30 NOTE — ED PROVIDER NOTE - OBJECTIVE STATEMENT
29 y/o M with a significant PMHx of asthma, factitious disorder, GERD, impulse control disorder, intellectual disability, mitral valve disease and mood disorder presents to the ED for dysuria and difficulty urinating today. Patient was discharged today from [facility] after 5 day hospitalization for urinary retention. Patient had Sarabia catheter removed earlier today and states he was able to urinate during the day only once with mild difficulty. Denies abdominal pain, nausea, vomiting, other GI/ symptoms or any other acute complaints.

## 2020-06-30 NOTE — ED PEDIATRIC NURSE REASSESSMENT NOTE - NS ED NURSE REASSESS COMMENT FT2
Pt sent from [facility] c/o of inability to urinate for 2-3 hours. Bladder scan showed 109mL of urine in the bladder. Notified Dr. Khan of this finding. MD aware. Will continue to monitor

## 2020-06-30 NOTE — CHART NOTE - NSCHARTNOTEFT_GEN_A_CORE
EVENT:      31 y/o male from group home, PMH of mood disorder, intellectual disability, factitious disorder, asthma, GERD, hypothyroidism, p/w burning sensation of urine. Patient stated he had sexual intercourse yesterday with his fiance and had dysuria since then. He stated he had having dysuria with occasional blood in urine for 2 months, specially after sexual intercourse. He reported only one partner and used condom as contraceptive measures. He denied any abdominal pain, penile discharge, fever, nausea, vomiting, joint pain, rash.  6/29/20, 9:40pm, Moises Montalvo - called. Patient was disoriented, very anxious and agitated. Patient pulled IV access. He said he hurt himself and showed scratches on his right arm. He was asking to "call for ". Patient attempted to leave. Gait - unsteady. Psych. - on call, Dr. De La Cruz - called.   OBJECTIVE:  Vital Signs Last 24 Hrs  T(C): 36.6 (30 Jun 2020 00:08), Max: 36.6 (29 Jun 2020 07:40)  T(F): 97.9 (30 Jun 2020 00:08), Max: 97.9 (29 Jun 2020 07:40)  HR: 78 (30 Jun 2020 00:08) (60 - 78)  BP: 110/64 (30 Jun 2020 00:08) (102/62 - 110/64)  BP(mean): --  RR: 18 (30 Jun 2020 00:08) (18 - 18)  SpO2: 98% (30 Jun 2020 00:08) (97% - 98%)    FOCUSED PHYSICAL EXAM:    LABS:                        14.4   8.07  )-----------( 273      ( 29 Jun 2020 06:42 )             46.6     06-29    141  |  103  |  12  ----------------------------<  83  4.1   |  30  |  0.95    Ca    9.5      29 Jun 2020 06:42  Phos  4.9     06-29  Mg     2.4     06-29    TPro  7.1  /  Alb  3.7  /  TBili  0.3  /  DBili  x   /  AST  6<L>  /  ALT  20  /  AlkPhos  120  06-29    PLAN: 1. Ativan 2 mg IM x 1 dose and Haldol 5 mg IM x 1 dose,  2. Constant observation, 3. Psych. consult.     FOLLOW UP / RESULT: Reassess patient's VS and behavioral changes.

## 2020-06-30 NOTE — ED ADULT NURSE NOTE - OBJECTIVE STATEMENT
pt BIBA c/o of inability to urinate. Pt recently discharged from Hospital with Sarabia removed. Currently c/o of pain pressure-like in the bladder. Denies nausea, vomiting, and diarrhea pt BIBA from [facility] c/o of inability to urinate. Pt just got discharged from Hospital today with Sarabia removed as per patient. Currently c/o of pain pressure-like in the bladde and urinary retention. Denies nausea, vomiting, and diarrhea

## 2020-06-30 NOTE — ED PROVIDER NOTE - CLINICAL SUMMARY MEDICAL DECISION MAKING FREE TEXT BOX
Bladder scan with only approximately 100 cc of urine in bladder. Low suspicion for UTI or urinary retention at this point. Will give pyridium for symptomatic control. Will encourage fluid intake and trial of spontaneous urination, likely discharge.

## 2020-06-30 NOTE — ED PROVIDER NOTE - CARE PROVIDER_API CALL
Andreas Pena J  UROLOGY  [address] [address] [place] [address]  Phone: [phone]  Fax: [phone]  Follow Up Time:

## 2020-07-01 VITALS
SYSTOLIC BLOOD PRESSURE: 115 MMHG | DIASTOLIC BLOOD PRESSURE: 75 MMHG | RESPIRATION RATE: 18 BRPM | TEMPERATURE: 98 F | HEART RATE: 61 BPM | OXYGEN SATURATION: 100 %

## 2020-07-01 LAB
CULTURE RESULTS: SIGNIFICANT CHANGE UP
SPECIMEN SOURCE: SIGNIFICANT CHANGE UP

## 2020-07-05 ENCOUNTER — EMERGENCY (EMERGENCY)
Facility: HOSPITAL | Age: 30
LOS: 1 days | Discharge: HOME | End: 2020-07-05
Attending: EMERGENCY MEDICINE
Payer: MEDICAID

## 2020-07-05 VITALS
HEIGHT: 71 IN | SYSTOLIC BLOOD PRESSURE: 115 MMHG | OXYGEN SATURATION: 97 % | DIASTOLIC BLOOD PRESSURE: 72 MMHG | RESPIRATION RATE: 16 BRPM | TEMPERATURE: 98 F | WEIGHT: 199.96 LBS | HEART RATE: 98 BPM

## 2020-07-05 LAB
APPEARANCE UR: CLEAR — SIGNIFICANT CHANGE UP
BACTERIA # UR AUTO: NEGATIVE /HPF — SIGNIFICANT CHANGE UP
BILIRUB UR-MCNC: NEGATIVE — SIGNIFICANT CHANGE UP
COLOR SPEC: YELLOW — SIGNIFICANT CHANGE UP
DIFF PNL FLD: NEGATIVE — SIGNIFICANT CHANGE UP
EPI CELLS # UR: ABNORMAL /HPF
GLUCOSE UR QL: NEGATIVE — SIGNIFICANT CHANGE UP
KETONES UR-MCNC: NEGATIVE — SIGNIFICANT CHANGE UP
LEUKOCYTE ESTERASE UR-ACNC: ABNORMAL
NITRITE UR-MCNC: NEGATIVE — SIGNIFICANT CHANGE UP
PH UR: 8 — SIGNIFICANT CHANGE UP (ref 5–8)
PROT UR-MCNC: NEGATIVE — SIGNIFICANT CHANGE UP
RBC CASTS # UR COMP ASSIST: SIGNIFICANT CHANGE UP /HPF (ref 0–2)
SP GR SPEC: 1.01 — SIGNIFICANT CHANGE UP (ref 1.01–1.02)
UROBILINOGEN FLD QL: NEGATIVE — SIGNIFICANT CHANGE UP
WBC UR QL: SIGNIFICANT CHANGE UP /HPF (ref 0–5)

## 2020-07-05 PROCEDURE — 99285 EMERGENCY DEPT VISIT HI MDM: CPT | Mod: 25

## 2020-07-05 RX ORDER — CEFTRIAXONE 500 MG/1
1000 INJECTION, POWDER, FOR SOLUTION INTRAMUSCULAR; INTRAVENOUS ONCE
Refills: 0 | Status: COMPLETED | OUTPATIENT
Start: 2020-07-05 | End: 2020-07-05

## 2020-07-05 RX ORDER — AZITHROMYCIN 500 MG/1
1000 TABLET, FILM COATED ORAL ONCE
Refills: 0 | Status: COMPLETED | OUTPATIENT
Start: 2020-07-05 | End: 2020-07-05

## 2020-07-05 RX ADMIN — CEFTRIAXONE 100 MILLIGRAM(S): 500 INJECTION, POWDER, FOR SOLUTION INTRAMUSCULAR; INTRAVENOUS at 21:05

## 2020-07-05 RX ADMIN — Medication 1 ENEMA: at 20:46

## 2020-07-05 RX ADMIN — AZITHROMYCIN 1000 MILLIGRAM(S): 500 TABLET, FILM COATED ORAL at 21:06

## 2020-07-05 NOTE — ED PROVIDER NOTE - OBJECTIVE STATEMENT
30 year old male with PMHx of factitious disorder, asthma, urinary retention, GERD, impulse control disorder, intellectual disability, mitral valve disease, and mood disorder and no significant PSHx presents to the ED with complaints of dysuria for one week. Patient reports that he has not made a bowel movement in one week, and notes some burning on urination. Patient states that he is sexually active, but uses protection during intercourse.   Allergies: Zyprexa (dystonic reaction)
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
## 2020-07-05 NOTE — ED ADULT TRIAGE NOTE - CHIEF COMPLAINT QUOTE
"I didn't go to bathroom many times , it hurts inside too. I take miralax "  Patient denies flu-like symptoms, denies exposure to person with corona virus or flu like symptoms.  Denies travel to high-risk states.

## 2020-07-05 NOTE — ED ADULT NURSE NOTE - OBJECTIVE STATEMENT
11 ml on bladder scan Pt came in  c/o constipation , evaluated by Dr Marie, 11 ml on bladder scan, iv access and blood work done, fleet enema given Pt came in  c/o constipation , evaluated by Dr Marie, 11 ml on bladder scan, iv access and blood work done, fleet enema given  Pt re-evaluated by Dr Griffin , placed on 1:1 observation ,  roam alert and yellow gown, blood work done, pt is cooperative, follows commands, fell asleep , no agitation noted.

## 2020-07-05 NOTE — ED ADULT NURSE REASSESSMENT NOTE - NS ED NURSE REASSESS COMMENT FT1
[id number] spoke to pt's mother state pt will need ambulette to go home , state if he took a cab he might run away. Ambulette service ordered for pt's safety. Pt's knows his address

## 2020-07-05 NOTE — ED PROVIDER NOTE - PATIENT PORTAL LINK FT
You can access the FollowMyHealth Patient Portal offered by [facility] by registering at the following website: [email or web address]. By joining [employer]’s FollowMyHealth portal, you will also be able to view your health information using other applications (apps) compatible with our system. You can access the FollowMyHealth Patient Portal offered by [facility] by registering at the following website: [email or web address]. By joining [employer]’s FollowMyHealth portal, you will also be able to view your health information using other applications (apps) compatible with our system.

## 2020-07-05 NOTE — ED PROVIDER NOTE - PMH
Asthma    Factitious disorder    GERD (gastroesophageal reflux disease)    Impulse control disorder    Intellectual disability    Mitral valve disease    Mood disorder    Urinary retention

## 2020-07-06 VITALS
DIASTOLIC BLOOD PRESSURE: 69 MMHG | SYSTOLIC BLOOD PRESSURE: 104 MMHG | HEART RATE: 58 BPM | TEMPERATURE: 98 F | RESPIRATION RATE: 16 BRPM

## 2020-07-06 LAB
ALBUMIN SERPL ELPH-MCNC: 3.3 G/DL — LOW (ref 3.5–5)
ALP SERPL-CCNC: 120 U/L — SIGNIFICANT CHANGE UP (ref 40–120)
ALT FLD-CCNC: 24 U/L DA — SIGNIFICANT CHANGE UP (ref 10–60)
APAP SERPL-MCNC: <2 UG/ML — LOW (ref 10–30)
APPEARANCE UR: CLEAR — SIGNIFICANT CHANGE UP
AST SERPL-CCNC: 25 U/L — SIGNIFICANT CHANGE UP (ref 10–40)
BASOPHILS # BLD AUTO: 0.03 K/UL — SIGNIFICANT CHANGE UP (ref 0–0.2)
BASOPHILS NFR BLD AUTO: 0.4 % — SIGNIFICANT CHANGE UP (ref 0–2)
BILIRUB DIRECT SERPL-MCNC: <0.1 MG/DL — SIGNIFICANT CHANGE UP (ref 0–0.2)
BILIRUB INDIRECT FLD-MCNC: >0 MG/DL — LOW (ref 0.2–1)
BILIRUB SERPL-MCNC: 0.1 MG/DL — LOW (ref 0.2–1.2)
BILIRUB UR-MCNC: NEGATIVE — SIGNIFICANT CHANGE UP
COLOR SPEC: YELLOW — SIGNIFICANT CHANGE UP
DIFF PNL FLD: NEGATIVE — SIGNIFICANT CHANGE UP
EOSINOPHIL # BLD AUTO: 0.17 K/UL — SIGNIFICANT CHANGE UP (ref 0–0.5)
EOSINOPHIL NFR BLD AUTO: 2.1 % — SIGNIFICANT CHANGE UP (ref 0–6)
ETHANOL SERPL-MCNC: <3 MG/DL — SIGNIFICANT CHANGE UP (ref 0–10)
GLUCOSE UR QL: NEGATIVE — SIGNIFICANT CHANGE UP
HCT VFR BLD CALC: 40.7 % — SIGNIFICANT CHANGE UP (ref 39–50)
HGB BLD-MCNC: 12.7 G/DL — LOW (ref 13–17)
IMM GRANULOCYTES NFR BLD AUTO: 0.5 % — SIGNIFICANT CHANGE UP (ref 0–1.5)
KETONES UR-MCNC: NEGATIVE — SIGNIFICANT CHANGE UP
LEUKOCYTE ESTERASE UR-ACNC: ABNORMAL
LITHIUM SERPL-MCNC: 0.5 MMOL/L — LOW (ref 0.6–1.2)
LYMPHOCYTES # BLD AUTO: 2.45 K/UL — SIGNIFICANT CHANGE UP (ref 1–3.3)
LYMPHOCYTES # BLD AUTO: 29.9 % — SIGNIFICANT CHANGE UP (ref 13–44)
MCHC RBC-ENTMCNC: 24.1 PG — LOW (ref 27–34)
MCHC RBC-ENTMCNC: 31.2 GM/DL — LOW (ref 32–36)
MCV RBC AUTO: 77.1 FL — LOW (ref 80–100)
MONOCYTES # BLD AUTO: 0.69 K/UL — SIGNIFICANT CHANGE UP (ref 0–0.9)
MONOCYTES NFR BLD AUTO: 8.4 % — SIGNIFICANT CHANGE UP (ref 2–14)
NEUTROPHILS # BLD AUTO: 4.81 K/UL — SIGNIFICANT CHANGE UP (ref 1.8–7.4)
NEUTROPHILS NFR BLD AUTO: 58.7 % — SIGNIFICANT CHANGE UP (ref 43–77)
NITRITE UR-MCNC: NEGATIVE — SIGNIFICANT CHANGE UP
NRBC # BLD: 0 /100 WBCS — SIGNIFICANT CHANGE UP (ref 0–0)
NT-PROBNP SERPL-SCNC: 84 PG/ML — SIGNIFICANT CHANGE UP (ref 0–125)
PCP SPEC-MCNC: SIGNIFICANT CHANGE UP
PH UR: 8 — SIGNIFICANT CHANGE UP (ref 5–8)
PLATELET # BLD AUTO: 222 K/UL — SIGNIFICANT CHANGE UP (ref 150–400)
PROT SERPL-MCNC: 6.5 G/DL — SIGNIFICANT CHANGE UP (ref 6–8.3)
PROT UR-MCNC: 15
RBC # BLD: 5.28 M/UL — SIGNIFICANT CHANGE UP (ref 4.2–5.8)
RBC # FLD: 15.1 % — HIGH (ref 10.3–14.5)
SALICYLATES SERPL-MCNC: <1.7 MG/DL — LOW (ref 2.8–20)
SARS-COV-2 RNA SPEC QL NAA+PROBE: SIGNIFICANT CHANGE UP
SP GR SPEC: 1.01 — SIGNIFICANT CHANGE UP (ref 1.01–1.02)
UROBILINOGEN FLD QL: NEGATIVE — SIGNIFICANT CHANGE UP
WBC # BLD: 8.19 K/UL — SIGNIFICANT CHANGE UP (ref 3.8–10.5)
WBC # FLD AUTO: 8.19 K/UL — SIGNIFICANT CHANGE UP (ref 3.8–10.5)

## 2020-07-06 PROCEDURE — 96374 THER/PROPH/DIAG INJ IV PUSH: CPT

## 2020-07-06 PROCEDURE — 80076 HEPATIC FUNCTION PANEL: CPT

## 2020-07-06 PROCEDURE — 81001 URINALYSIS AUTO W/SCOPE: CPT

## 2020-07-06 PROCEDURE — U0003: CPT

## 2020-07-06 PROCEDURE — 80307 DRUG TEST PRSMV CHEM ANLYZR: CPT

## 2020-07-06 PROCEDURE — 99285 EMERGENCY DEPT VISIT HI MDM: CPT | Mod: 25

## 2020-07-06 PROCEDURE — 90792 PSYCH DIAG EVAL W/MED SRVCS: CPT | Mod: 95

## 2020-07-06 PROCEDURE — 36415 COLL VENOUS BLD VENIPUNCTURE: CPT

## 2020-07-06 PROCEDURE — 93005 ELECTROCARDIOGRAM TRACING: CPT

## 2020-07-06 PROCEDURE — 80048 BASIC METABOLIC PNL TOTAL CA: CPT

## 2020-07-06 PROCEDURE — 80178 ASSAY OF LITHIUM: CPT

## 2020-07-06 PROCEDURE — 85027 COMPLETE CBC AUTOMATED: CPT

## 2020-07-06 RX ORDER — HALOPERIDOL DECANOATE 100 MG/ML
5 INJECTION INTRAMUSCULAR ONCE
Refills: 0 | Status: DISCONTINUED | OUTPATIENT
Start: 2020-07-06 | End: 2020-07-06

## 2020-07-06 NOTE — ED BEHAVIORAL HEALTH ASSESSMENT NOTE - SAFETY PLAN ADDT'L DETAILS
Safety plan discussed with.../Education provided regarding environmental safety / lethal means restriction/Provision of National Suicide Prevention Lifeline 9-117-271-OAPG (7337)
Safety plan discussed with...

## 2020-07-06 NOTE — ED BEHAVIORAL HEALTH ASSESSMENT NOTE - SAFETY PLAN DETAILS
Extensive safety planning performed. Patient and staff agreeing verbally to return patient to ER or call 911 if symptoms worsen or patient has urges to harm self or others.
call 911 or return to ED

## 2020-07-06 NOTE — ED BEHAVIORAL HEALTH ASSESSMENT NOTE - VIOLENCE RISK FACTORS:
Lack of insight into violence risk/need for treatment/Impulsivity
Lack of insight into violence risk/need for treatment/Impulsivity

## 2020-07-06 NOTE — ED BEHAVIORAL HEALTH ASSESSMENT NOTE - SUMMARY
29yo  male, single, domiciled with peers at [facility] in [facility]  PPH of mood disorder, intellectual disability and factitious disorder, 4 reported inpatient admissions (he cannot recall when last one was), no prior suicide attempts, no know history of substance abuse, HX of SIB (Scratching) brought in by EMS, activated by the patient.     Patient reports that he has been in his residence for a year and he does not like the place. "they are rude; they curse" He states that he wanted to go somewhere, did not want to stay in his residence ; this morning he was in [facility] c/o dizziness and when he was d/yasmin back he called 911 and came here for psych evaluation this time. Patient admits that he just wants to stay here overnight. ; he says that he missed his family; He wants to be with his mother "all the time; but she takes me home on week ends sometimes" He denied intent to harm self or anyone else.  Patient denies any depressive symptoms including depressed mood, anhedonia, changes in energy/concentration/appetite, sleep disturbances, or feelings of guilt. Patient denies manic symptoms including elevated mood, increased irritability, mood lability, distractibility, grandiosity, pressured speech, increase in goal-directed activity, or decreased need for sleep. Patient denies any psychotic symptoms including paranoia, ideas of reference, thought insertion/broadcasting, or auditory/visual/olfactory/tactile/gustatory hallucinations. patient stated earlier that he had some beer. patient wants to go to bed "now"    See  note for collateral information. No safety concerns
The patient is a 30-year-old male; domiciled at [facility]; PPHx of mood disorder, ID, factitious disorder, past admissions, no known hx SA, hx NSSIB; no known legal hx; BIB EMS activated by self for constipation; psychiatry consulted for CAH.  Pt denying all psychiatric complaints at this time and is requesting discharge.  Pt does not appear acutely depressed, psychotic, manic, anxious, intoxicated, or agitation.  He has been calm and cooperative per ED staff.  Pt does not meet criteria for involuntary admission at this time.  He remains at chronically elevated risk given hx of ID, impulsivity, poor frustration tolerance.

## 2020-07-06 NOTE — ED BEHAVIORAL HEALTH ASSESSMENT NOTE - CURRENT MEDICATION
divalproex; fluoxetine prazosin, synthroid, simvastatin
per last behavioral health assessment note in June 2020: divalproex; fluoxetine, prazosin, synthroid, simvastatin
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
## 2020-07-06 NOTE — ED BEHAVIORAL HEALTH ASSESSMENT NOTE - OTHER
[facility] peers limited due to intellectual disability cognitive impairment Impulsivity misses his mother concrete staff

## 2020-07-06 NOTE — ED BEHAVIORAL HEALTH ASSESSMENT NOTE - OTHER
external billing defer to med ED limited due to intellectual disability concrete copy of safety plan to be provided cognitive impairment "constipation" peers

## 2020-07-06 NOTE — ED BEHAVIORAL HEALTH ASSESSMENT NOTE - REFERRAL / APPOINTMENT DETAILS
follow up with his psych providers as scheduled at group home
follow up with outpatient psychiatrist ASAP

## 2020-07-06 NOTE — ED BEHAVIORAL HEALTH ASSESSMENT NOTE - OTHER PAST PSYCHIATRIC HISTORY (INCLUDE DETAILS REGARDING ONSET, COURSE OF ILLNESS, INPATIENT/OUTPATIENT TREATMENT)
see HPI
PPH of mood disorder, intellectual disability and factitious disorder, 4 reported inpatient admissions (he cannot recall when last one was), no prior suicide attempts, no know history of substance abuse

## 2020-07-06 NOTE — ED BEHAVIORAL HEALTH ASSESSMENT NOTE - DESCRIPTION
Per ED provider, pt has been calm and cooperative without behavioral issues.      see  note for further details asthma lives in [facility]

## 2020-07-06 NOTE — ED BEHAVIORAL HEALTH ASSESSMENT NOTE - RISK ASSESSMENT
risk factors: male, single, hx NSSIB, impulsivity  protective factors: domiciled, no hx SA, denies access to guns/weapons, denies insomnia, denies SI/HI, future-oriented, help-seeking Low Acute Suicide Risk

## 2020-07-06 NOTE — ED BEHAVIORAL HEALTH ASSESSMENT NOTE - VIOLENCE PROTECTIVE FACTORS:
Residential stability/Good treatment response/compliance/Engagement in treatment
Residential stability/Engagement in treatment/Good treatment response/compliance

## 2020-07-06 NOTE — ED BEHAVIORAL HEALTH ASSESSMENT NOTE - SUICIDE PROTECTIVE FACTORS
Positive therapeutic relationships/Supportive social network of family or friends/Responsibility to family and others/Identifies reasons for living/Has future plans
Responsibility to family and others/Supportive social network of family or friends/Positive therapeutic relationships/Engaged in work or school/Fear of death or the actual act of killing self/Identifies reasons for living/Has future plans

## 2020-07-06 NOTE — ED BEHAVIORAL HEALTH ASSESSMENT NOTE - PAST PSYCHOTROPIC MEDICATION
per last  assessment: zyprexa; divalproex ; haloperidol ; benztropine  docusate 100mg po TID, clonidine 0.1mg po TID; omeprazole 20mg po qAM; simvastatin 5mg po daily; hydroxy hcl 25mg po BID; levothyroxine 50mcg po daily; vitamin d3 1000IU po daily
zyprexa; divalproex ; haloperidol ; benztropine  docusate 100mg po TID, clonidine 0.1mg po TID; omeprazole 20mg po qAM; simvastatin 5mg po daily; hydroxy hcl 25mg po BID; levothyroxine 50mcg po daily; vitamin d3 1000IU po daily
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
## 2020-07-08 ENCOUNTER — EMERGENCY (EMERGENCY)
Facility: HOSPITAL | Age: 30
LOS: 1 days | Discharge: HOME | End: 2020-07-08
Attending: EMERGENCY MEDICINE
Payer: MEDICAID

## 2020-07-08 VITALS
DIASTOLIC BLOOD PRESSURE: 85 MMHG | HEIGHT: 71 IN | OXYGEN SATURATION: 96 % | HEART RATE: 96 BPM | SYSTOLIC BLOOD PRESSURE: 134 MMHG | WEIGHT: 220.02 LBS | TEMPERATURE: 98 F | RESPIRATION RATE: 15 BRPM

## 2020-07-08 LAB
APPEARANCE UR: CLEAR — SIGNIFICANT CHANGE UP
BILIRUB UR-MCNC: NEGATIVE — SIGNIFICANT CHANGE UP
COLOR SPEC: YELLOW — SIGNIFICANT CHANGE UP
DIFF PNL FLD: NEGATIVE — SIGNIFICANT CHANGE UP
GLUCOSE UR QL: NEGATIVE — SIGNIFICANT CHANGE UP
KETONES UR-MCNC: NEGATIVE — SIGNIFICANT CHANGE UP
LEUKOCYTE ESTERASE UR-ACNC: NEGATIVE — SIGNIFICANT CHANGE UP
NITRITE UR-MCNC: NEGATIVE — SIGNIFICANT CHANGE UP
PH UR: 6.5 — SIGNIFICANT CHANGE UP (ref 5–8)
PROT UR-MCNC: NEGATIVE — SIGNIFICANT CHANGE UP
SP GR SPEC: 1.01 — SIGNIFICANT CHANGE UP (ref 1.01–1.02)
UROBILINOGEN FLD QL: NEGATIVE — SIGNIFICANT CHANGE UP

## 2020-07-08 PROCEDURE — 87491 CHLMYD TRACH DNA AMP PROBE: CPT

## 2020-07-08 PROCEDURE — 99283 EMERGENCY DEPT VISIT LOW MDM: CPT

## 2020-07-08 PROCEDURE — 81003 URINALYSIS AUTO W/O SCOPE: CPT

## 2020-07-08 PROCEDURE — 99284 EMERGENCY DEPT VISIT MOD MDM: CPT

## 2020-07-08 PROCEDURE — 87086 URINE CULTURE/COLONY COUNT: CPT

## 2020-07-08 PROCEDURE — 87591 N.GONORRHOEAE DNA AMP PROB: CPT

## 2020-07-08 NOTE — ED PROVIDER NOTE - CLINICAL SUMMARY MEDICAL DECISION MAKING FREE TEXT BOX
30 year old male with dysuria and hematuria. vitals WNL. PE as above. 30 year old male with dysuria and hematuria. vitals WNL. PE as above.  ua unremarkable. normal PE. on bladder scan only 100cc. will dc with ambulatte. f/u PMD. return precautions discussed.

## 2020-07-08 NOTE — ED PROVIDER NOTE - PATIENT PORTAL LINK FT
You can access the FollowMyHealth Patient Portal offered by [facility] by registering at the following website: [email or web address]. By joining [employer]’s FollowMyHealth portal, you will also be able to view your health information using other applications (apps) compatible with our system.

## 2020-07-08 NOTE — ED ADULT TRIAGE NOTE - CHIEF COMPLAINT QUOTE
Sent from Group Home ([facility]). Pt states "I have pain with urination and little blood coming from there". As per EMS, last time void was 2100. Denies recent travel or possible covid exposure Sent from Group Home ([facility]). Pt states "I have oral sex earlier now I have pain with urination and little blood coming from there". As per EMS, last time void was 2100. Denies recent travel or possible covid exposure

## 2020-07-08 NOTE — ED ADULT NURSE REASSESSMENT NOTE - NS ED NURSE REASSESS COMMENT FT1
10 40 pm bladder scan done .185 ml urine noted in the bladder .pt urinated about 160 ml of urine . urine sent for UA ,C/S ,and clamedia test . pt resting now ,no distress

## 2020-07-08 NOTE — ED PROVIDER NOTE - OBJECTIVE STATEMENT
30 year old male PMH factitious disorder, asthma, hx urinary retention, GERD, impulse control disorder, MVR coming in for dysuria and hematuria s/p oral sex. states mild suprapubic pain. denies all other complaints.

## 2020-07-08 NOTE — ED ADULT NURSE NOTE - OBJECTIVE STATEMENT
30 yrs old Male complaining of pain in the penis while urinating .states that had oral sex earlier today .when urinating blood noted.

## 2020-07-08 NOTE — ED ADULT NURSE NOTE - CHIEF COMPLAINT QUOTE
Sent from Group Home ([facility]). Pt states "I have oral sex earlier now I have pain with urination and little blood coming from there". As per EMS, last time void was 2100. Denies recent travel or possible covid exposure

## 2020-07-08 NOTE — ED ADULT NURSE NOTE - NSIMPLEMENTINTERV_GEN_ALL_ED
Implemented All Fall Risk Interventions:  [place] to call system. Call bell, personal items and telephone within reach. Instruct patient to call for assistance. Room bathroom lighting operational. Non-slip footwear when patient is off stretcher. Physically safe environment: no spills, clutter or unnecessary equipment. Stretcher in lowest position, wheels locked, appropriate side rails in place. Provide visual cue, wrist band, yellow gown, etc. Monitor gait and stability. Monitor for mental status changes and reorient to person, place, and time. Review medications for side effects contributing to fall risk. Reinforce activity limits and safety measures with patient and family.

## 2020-07-09 VITALS
SYSTOLIC BLOOD PRESSURE: 116 MMHG | HEART RATE: 64 BPM | DIASTOLIC BLOOD PRESSURE: 75 MMHG | RESPIRATION RATE: 18 BRPM | TEMPERATURE: 98 F | OXYGEN SATURATION: 99 %

## 2020-07-09 LAB
C TRACH RRNA SPEC QL NAA+PROBE: SIGNIFICANT CHANGE UP
CULTURE RESULTS: NO GROWTH — SIGNIFICANT CHANGE UP
N GONORRHOEA RRNA SPEC QL NAA+PROBE: SIGNIFICANT CHANGE UP
SPECIMEN SOURCE: SIGNIFICANT CHANGE UP
SPECIMEN SOURCE: SIGNIFICANT CHANGE UP

## 2020-08-01 ENCOUNTER — EMERGENCY (EMERGENCY)
Facility: HOSPITAL | Age: 30
LOS: 1 days | Discharge: HOME | End: 2020-08-01
Attending: EMERGENCY MEDICINE
Payer: MEDICAID

## 2020-08-01 VITALS
TEMPERATURE: 98 F | RESPIRATION RATE: 16 BRPM | OXYGEN SATURATION: 99 % | SYSTOLIC BLOOD PRESSURE: 115 MMHG | DIASTOLIC BLOOD PRESSURE: 79 MMHG | HEART RATE: 80 BPM

## 2020-08-01 LAB
ALBUMIN SERPL ELPH-MCNC: 3.6 G/DL — SIGNIFICANT CHANGE UP (ref 3.5–5)
ALP SERPL-CCNC: 98 U/L — SIGNIFICANT CHANGE UP (ref 40–120)
ALT FLD-CCNC: 32 U/L DA — SIGNIFICANT CHANGE UP (ref 10–60)
ANION GAP SERPL CALC-SCNC: 3 MMOL/L — LOW (ref 5–17)
APPEARANCE UR: CLEAR — SIGNIFICANT CHANGE UP
AST SERPL-CCNC: 19 U/L — SIGNIFICANT CHANGE UP (ref 10–40)
BASOPHILS # BLD AUTO: 0.04 K/UL — SIGNIFICANT CHANGE UP (ref 0–0.2)
BASOPHILS NFR BLD AUTO: 0.7 % — SIGNIFICANT CHANGE UP (ref 0–2)
BILIRUB SERPL-MCNC: 0.2 MG/DL — SIGNIFICANT CHANGE UP (ref 0.2–1.2)
BILIRUB UR-MCNC: NEGATIVE — SIGNIFICANT CHANGE UP
BUN SERPL-MCNC: 5 MG/DL — LOW (ref 7–18)
CALCIUM SERPL-MCNC: 8.7 MG/DL — SIGNIFICANT CHANGE UP (ref 8.4–10.5)
CHLORIDE SERPL-SCNC: 111 MMOL/L — HIGH (ref 96–108)
CO2 SERPL-SCNC: 30 MMOL/L — SIGNIFICANT CHANGE UP (ref 22–31)
COLOR SPEC: YELLOW — SIGNIFICANT CHANGE UP
CREAT SERPL-MCNC: 0.75 MG/DL — SIGNIFICANT CHANGE UP (ref 0.5–1.3)
DIFF PNL FLD: NEGATIVE — SIGNIFICANT CHANGE UP
EOSINOPHIL # BLD AUTO: 0.16 K/UL — SIGNIFICANT CHANGE UP (ref 0–0.5)
EOSINOPHIL NFR BLD AUTO: 2.7 % — SIGNIFICANT CHANGE UP (ref 0–6)
GLUCOSE SERPL-MCNC: 87 MG/DL — SIGNIFICANT CHANGE UP (ref 70–99)
GLUCOSE UR QL: NEGATIVE — SIGNIFICANT CHANGE UP
HCT VFR BLD CALC: 42.5 % — SIGNIFICANT CHANGE UP (ref 39–50)
HGB BLD-MCNC: 13.1 G/DL — SIGNIFICANT CHANGE UP (ref 13–17)
IMM GRANULOCYTES NFR BLD AUTO: 0.3 % — SIGNIFICANT CHANGE UP (ref 0–1.5)
KETONES UR-MCNC: NEGATIVE — SIGNIFICANT CHANGE UP
LEUKOCYTE ESTERASE UR-ACNC: NEGATIVE — SIGNIFICANT CHANGE UP
LYMPHOCYTES # BLD AUTO: 1.7 K/UL — SIGNIFICANT CHANGE UP (ref 1–3.3)
LYMPHOCYTES # BLD AUTO: 28.4 % — SIGNIFICANT CHANGE UP (ref 13–44)
MCHC RBC-ENTMCNC: 23.7 PG — LOW (ref 27–34)
MCHC RBC-ENTMCNC: 30.8 GM/DL — LOW (ref 32–36)
MCV RBC AUTO: 76.9 FL — LOW (ref 80–100)
MONOCYTES # BLD AUTO: 0.44 K/UL — SIGNIFICANT CHANGE UP (ref 0–0.9)
MONOCYTES NFR BLD AUTO: 7.4 % — SIGNIFICANT CHANGE UP (ref 2–14)
NEUTROPHILS # BLD AUTO: 3.62 K/UL — SIGNIFICANT CHANGE UP (ref 1.8–7.4)
NEUTROPHILS NFR BLD AUTO: 60.5 % — SIGNIFICANT CHANGE UP (ref 43–77)
NITRITE UR-MCNC: NEGATIVE — SIGNIFICANT CHANGE UP
NRBC # BLD: 0 /100 WBCS — SIGNIFICANT CHANGE UP (ref 0–0)
PH UR: 7 — SIGNIFICANT CHANGE UP (ref 5–8)
PLATELET # BLD AUTO: 255 K/UL — SIGNIFICANT CHANGE UP (ref 150–400)
POTASSIUM SERPL-MCNC: 4.3 MMOL/L — SIGNIFICANT CHANGE UP (ref 3.5–5.3)
POTASSIUM SERPL-SCNC: 4.3 MMOL/L — SIGNIFICANT CHANGE UP (ref 3.5–5.3)
PROT SERPL-MCNC: 6.6 G/DL — SIGNIFICANT CHANGE UP (ref 6–8.3)
PROT UR-MCNC: NEGATIVE — SIGNIFICANT CHANGE UP
RBC # BLD: 5.53 M/UL — SIGNIFICANT CHANGE UP (ref 4.2–5.8)
RBC # FLD: 16 % — HIGH (ref 10.3–14.5)
SODIUM SERPL-SCNC: 144 MMOL/L — SIGNIFICANT CHANGE UP (ref 135–145)
SP GR SPEC: 1.01 — SIGNIFICANT CHANGE UP (ref 1.01–1.02)
UROBILINOGEN FLD QL: 1
WBC # BLD: 5.98 K/UL — SIGNIFICANT CHANGE UP (ref 3.8–10.5)
WBC # FLD AUTO: 5.98 K/UL — SIGNIFICANT CHANGE UP (ref 3.8–10.5)

## 2020-08-01 PROCEDURE — 99283 EMERGENCY DEPT VISIT LOW MDM: CPT

## 2020-08-01 PROCEDURE — 99282 EMERGENCY DEPT VISIT SF MDM: CPT

## 2020-08-01 PROCEDURE — 81003 URINALYSIS AUTO W/O SCOPE: CPT

## 2020-08-01 PROCEDURE — 85027 COMPLETE CBC AUTOMATED: CPT

## 2020-08-01 PROCEDURE — 36415 COLL VENOUS BLD VENIPUNCTURE: CPT

## 2020-08-01 PROCEDURE — 87591 N.GONORRHOEAE DNA AMP PROB: CPT

## 2020-08-01 PROCEDURE — 80053 COMPREHEN METABOLIC PANEL: CPT

## 2020-08-01 PROCEDURE — 87086 URINE CULTURE/COLONY COUNT: CPT

## 2020-08-01 PROCEDURE — 87491 CHLMYD TRACH DNA AMP PROBE: CPT

## 2020-08-01 RX ORDER — SODIUM CHLORIDE 9 MG/ML
1000 INJECTION INTRAMUSCULAR; INTRAVENOUS; SUBCUTANEOUS ONCE
Refills: 0 | Status: COMPLETED | OUTPATIENT
Start: 2020-08-01 | End: 2020-08-01

## 2020-08-01 RX ADMIN — SODIUM CHLORIDE 1000 MILLILITER(S): 9 INJECTION INTRAMUSCULAR; INTRAVENOUS; SUBCUTANEOUS at 23:20

## 2020-08-01 NOTE — ED PROVIDER NOTE - CLINICAL SUMMARY MEDICAL DECISION MAKING FREE TEXT BOX
30 year old male PMH factitious disorder, asthma, hx urinary retention, GERD, impulse control disorder, MVR coming in for dysuria for 2 days.  pt frequently comes to ed for same complain and has unprotected intercourse with same female partner.  pt was treated multiple times for GC and chlamydia and has resulted neg each time.  pt admits having intercourse with female partner yesterday.  c/o unable to urinate but no visible discharge. pt asking to have mckeon placed.    will check labs, ua, mckeon, gc/chlamydia- will hold off on treating for gc/chlamydia again.  I suspect supratentorial vs bladder dysfunction and should see urologist.

## 2020-08-01 NOTE — ED ADULT TRIAGE NOTE - CHIEF COMPLAINT QUOTE
I have difficulty urinating x few days.  I 2as treated and discharged from [facility] yesterday for the same problem

## 2020-08-01 NOTE — ED PROVIDER NOTE - PROGRESS NOTE DETAILS
Ayoub: ua clean. normal wbc. voided 400cc and bladder scan less than 100.  dx dysuria.  can take tylenol/motrin. f/u with pcp/urology. will get transportation back to facility

## 2020-08-01 NOTE — ED PROVIDER NOTE - PATIENT PORTAL LINK FT
You can access the FollowMyHealth Patient Portal offered by [facility] by registering at the following website: [email or web address]. By joining [employer]’s FollowMyHealth portal, you will also be able to view your health information using other applications (apps) compatible with our system.

## 2020-08-01 NOTE — ED PROVIDER NOTE - OBJECTIVE STATEMENT
30 year old male PMH factitious disorder, asthma, hx urinary retention, GERD, impulse control disorder, MVR coming in for dysuria for 2 days.  pt frequently comes to ed for same complain and has unprotected intercourse with same female partner.  pt was treated multiple times for GC and chlamydia and has resulted neg each time.  pt admits having intercourse with female partner yesterday.  c/o unable to urinate but no visible discharge. pt asking to have mckeon placed. no fever, no chills, no rashes

## 2020-08-01 NOTE — ED ADULT NURSE REASSESSMENT NOTE - NS ED NURSE REASSESS COMMENT FT1
Pt stated he has difficulty voiding. Pt voided 400cc in the urinal. Post void bladder scan showed 57cc of urine. Notified Dr. Eugenio Ayoub. Held mckeon for now.

## 2020-08-02 ENCOUNTER — EMERGENCY (EMERGENCY)
Facility: HOSPITAL | Age: 30
LOS: 1 days | Discharge: HOME | End: 2020-08-02
Attending: EMERGENCY MEDICINE
Payer: MEDICAID

## 2020-08-02 VITALS
WEIGHT: 145.06 LBS | SYSTOLIC BLOOD PRESSURE: 117 MMHG | RESPIRATION RATE: 16 BRPM | OXYGEN SATURATION: 97 % | TEMPERATURE: 98 F | DIASTOLIC BLOOD PRESSURE: 80 MMHG | HEART RATE: 83 BPM

## 2020-08-02 VITALS
HEART RATE: 80 BPM | TEMPERATURE: 97 F | OXYGEN SATURATION: 100 % | RESPIRATION RATE: 14 BRPM | DIASTOLIC BLOOD PRESSURE: 76 MMHG | SYSTOLIC BLOOD PRESSURE: 122 MMHG

## 2020-08-02 PROBLEM — R33.9 RETENTION OF URINE, UNSPECIFIED: Chronic | Status: ACTIVE | Noted: 2020-07-05

## 2020-08-02 PROCEDURE — 99283 EMERGENCY DEPT VISIT LOW MDM: CPT

## 2020-08-02 PROCEDURE — 99284 EMERGENCY DEPT VISIT MOD MDM: CPT | Mod: 25

## 2020-08-02 NOTE — ED PROVIDER NOTE - PMH
Asthma    Enuresis    Factitious disorder    GERD (gastroesophageal reflux disease)    Hyperthyroidism    Impulse control disorder    Intellectual disability    Mitral valve disease    Mood disorder    Myopia of both eyes    Urinary retention

## 2020-08-02 NOTE — ED PROVIDER NOTE - OBJECTIVE STATEMENT
31 y/o man, h/o enlarged prostate, frequent urinary catheterizations, factitious disorder, moderate intellectual disability, enuresis, asthma, hyperthyroidism, impulse control disorder, sent from [facility] for Pt c/o inability to urinate since he was here at [place] ED last night.  Pt c/o pressure over bladder area.  Denies any other symptoms.  I called staff at [facility], and spoke with patient care assistant, Christina, and she confirms that Pt urinated a "normal amount" spontaneously this morning and again this afternoon.

## 2020-08-02 NOTE — ED PROVIDER NOTE - PATIENT PORTAL LINK FT
You can access the FollowMyHealth Patient Portal offered by [facility] by registering at the following website: [email or web address]. By joining [employer]’s FollowMyHealth portal, you will also be able to view your health information using other applications (apps) compatible with our system.

## 2020-08-02 NOTE — ED ADULT NURSE NOTE - PMH
Asthma    Factitious disorder    GERD (gastroesophageal reflux disease)    Impulse control disorder    Intellectual disability    Mitral valve disease    Mood disorder    Urinary retention Asthma    Enuresis    Factitious disorder    GERD (gastroesophageal reflux disease)    Hyperthyroidism    Impulse control disorder    Intellectual disability    Mitral valve disease    Mood disorder    Myopia of both eyes    Urinary retention

## 2020-08-02 NOTE — ED ADULT NURSE NOTE - OBJECTIVE STATEMENT
Pt BIBA from group home c/o of difficulty urinating. Pt states "I have this problem for 2 months." Pt c/o of suprapubic discomfort but denies NV and diarrhea. Denies fever. Pt voided in the ED in the urinal without difficulty and bladder scan was done (refer to nursing reassessment note for further info)

## 2020-08-02 NOTE — ED ADULT NURSE NOTE - OBJECTIVE STATEMENT
Pt came back for unable to urinate since last night. Pt states being here last night for the same reason.

## 2020-08-02 NOTE — ED PROVIDER NOTE - CLINICAL SUMMARY MEDICAL DECISION MAKING FREE TEXT BOX
31 y/o man, h/o enlarged prostate, frequent urinary catheterizations, factitious disorder, moderate intellectual disability, enuresis, asthma, hyperthyroidism, impulse control disorder, sent from [facility] for Pt c/o inability to urinate since he was here at [place] ED last night.  Pt c/o pressure over bladder area.  Denies any other symptoms.  I called staff at [facility], and spoke with patient care assistant, Christina, and she confirms that Pt urinated a "normal amount" spontaneously this morning and again this afternoon--Pt's bladder scan in the ED shows almost 300 mL of urine in bladder.  Abdomen is soft and nontender.  No indication for urinary catheter at this time.  Will transport back to [facility].

## 2020-08-02 NOTE — ED ADULT NURSE NOTE - CHIEF COMPLAINT QUOTE
I have difficulty urinating x few days.  I 2as treated and discharged from [facility] yesterday for the same problem

## 2020-08-20 ENCOUNTER — EMERGENCY (EMERGENCY)
Facility: HOSPITAL | Age: 30
LOS: 1 days | Discharge: HOME | End: 2020-08-20
Attending: STUDENT IN AN ORGANIZED HEALTH CARE EDUCATION/TRAINING PROGRAM | Admitting: EMERGENCY MEDICINE
Payer: MEDICAID

## 2020-08-20 VITALS
OXYGEN SATURATION: 100 % | RESPIRATION RATE: 18 BRPM | DIASTOLIC BLOOD PRESSURE: 70 MMHG | TEMPERATURE: 98 F | HEART RATE: 92 BPM | SYSTOLIC BLOOD PRESSURE: 124 MMHG

## 2020-08-20 LAB
ALBUMIN SERPL ELPH-MCNC: 4.6 G/DL — SIGNIFICANT CHANGE UP (ref 3.3–5)
ALP SERPL-CCNC: 111 U/L — SIGNIFICANT CHANGE UP (ref 40–120)
ALT FLD-CCNC: 20 U/L — SIGNIFICANT CHANGE UP (ref 4–41)
ANION GAP SERPL CALC-SCNC: 12 MMO/L — SIGNIFICANT CHANGE UP (ref 7–14)
APPEARANCE UR: CLEAR — SIGNIFICANT CHANGE UP
AST SERPL-CCNC: 15 U/L — SIGNIFICANT CHANGE UP (ref 4–40)
BASOPHILS # BLD AUTO: 0.01 K/UL — SIGNIFICANT CHANGE UP (ref 0–0.2)
BASOPHILS NFR BLD AUTO: 0.2 % — SIGNIFICANT CHANGE UP (ref 0–2)
BILIRUB SERPL-MCNC: < 0.2 MG/DL — LOW (ref 0.2–1.2)
BILIRUB UR-MCNC: NEGATIVE — SIGNIFICANT CHANGE UP
BLOOD UR QL VISUAL: NEGATIVE — SIGNIFICANT CHANGE UP
BUN SERPL-MCNC: 11 MG/DL — SIGNIFICANT CHANGE UP (ref 7–23)
CALCIUM SERPL-MCNC: 9.3 MG/DL — SIGNIFICANT CHANGE UP (ref 8.4–10.5)
CHLORIDE SERPL-SCNC: 101 MMOL/L — SIGNIFICANT CHANGE UP (ref 98–107)
CO2 SERPL-SCNC: 28 MMOL/L — SIGNIFICANT CHANGE UP (ref 22–31)
COLOR SPEC: COLORLESS — SIGNIFICANT CHANGE UP
CREAT SERPL-MCNC: 0.83 MG/DL — SIGNIFICANT CHANGE UP (ref 0.5–1.3)
EOSINOPHIL # BLD AUTO: 0.19 K/UL — SIGNIFICANT CHANGE UP (ref 0–0.5)
EOSINOPHIL NFR BLD AUTO: 3.2 % — SIGNIFICANT CHANGE UP (ref 0–6)
GLUCOSE SERPL-MCNC: 88 MG/DL — SIGNIFICANT CHANGE UP (ref 70–99)
GLUCOSE UR-MCNC: NEGATIVE — SIGNIFICANT CHANGE UP
HCT VFR BLD CALC: 41.4 % — SIGNIFICANT CHANGE UP (ref 39–50)
HGB BLD-MCNC: 12.8 G/DL — LOW (ref 13–17)
IMM GRANULOCYTES NFR BLD AUTO: 0.2 % — SIGNIFICANT CHANGE UP (ref 0–1.5)
KETONES UR-MCNC: NEGATIVE — SIGNIFICANT CHANGE UP
LEUKOCYTE ESTERASE UR-ACNC: NEGATIVE — SIGNIFICANT CHANGE UP
LIDOCAIN IGE QN: 43.4 U/L — SIGNIFICANT CHANGE UP (ref 7–60)
LYMPHOCYTES # BLD AUTO: 1.75 K/UL — SIGNIFICANT CHANGE UP (ref 1–3.3)
LYMPHOCYTES # BLD AUTO: 29.8 % — SIGNIFICANT CHANGE UP (ref 13–44)
MCHC RBC-ENTMCNC: 23.6 PG — LOW (ref 27–34)
MCHC RBC-ENTMCNC: 30.9 % — LOW (ref 32–36)
MCV RBC AUTO: 76.4 FL — LOW (ref 80–100)
MONOCYTES # BLD AUTO: 0.5 K/UL — SIGNIFICANT CHANGE UP (ref 0–0.9)
MONOCYTES NFR BLD AUTO: 8.5 % — SIGNIFICANT CHANGE UP (ref 2–14)
NEUTROPHILS # BLD AUTO: 3.42 K/UL — SIGNIFICANT CHANGE UP (ref 1.8–7.4)
NEUTROPHILS NFR BLD AUTO: 58.1 % — SIGNIFICANT CHANGE UP (ref 43–77)
NITRITE UR-MCNC: NEGATIVE — SIGNIFICANT CHANGE UP
NRBC # FLD: 0 K/UL — SIGNIFICANT CHANGE UP (ref 0–0)
PH UR: 7.5 — SIGNIFICANT CHANGE UP (ref 5–8)
PLATELET # BLD AUTO: 236 K/UL — SIGNIFICANT CHANGE UP (ref 150–400)
PMV BLD: 11 FL — SIGNIFICANT CHANGE UP (ref 7–13)
POTASSIUM SERPL-MCNC: 4.4 MMOL/L — SIGNIFICANT CHANGE UP (ref 3.5–5.3)
POTASSIUM SERPL-SCNC: 4.4 MMOL/L — SIGNIFICANT CHANGE UP (ref 3.5–5.3)
PROT SERPL-MCNC: 6.6 G/DL — SIGNIFICANT CHANGE UP (ref 6–8.3)
PROT UR-MCNC: NEGATIVE — SIGNIFICANT CHANGE UP
RBC # BLD: 5.42 M/UL — SIGNIFICANT CHANGE UP (ref 4.2–5.8)
RBC # FLD: 16 % — HIGH (ref 10.3–14.5)
SODIUM SERPL-SCNC: 141 MMOL/L — SIGNIFICANT CHANGE UP (ref 135–145)
SP GR SPEC: 1.01 — SIGNIFICANT CHANGE UP (ref 1–1.04)
TROPONIN T, HIGH SENSITIVITY: 8 NG/L — SIGNIFICANT CHANGE UP (ref ?–14)
UROBILINOGEN FLD QL: NORMAL — SIGNIFICANT CHANGE UP
WBC # BLD: 5.88 K/UL — SIGNIFICANT CHANGE UP (ref 3.8–10.5)
WBC # FLD AUTO: 5.88 K/UL — SIGNIFICANT CHANGE UP (ref 3.8–10.5)

## 2020-08-20 PROCEDURE — 76775 US EXAM ABDO BACK WALL LIM: CPT | Mod: 26

## 2020-08-20 PROCEDURE — 99284 EMERGENCY DEPT VISIT MOD MDM: CPT | Mod: 25

## 2020-08-20 RX ORDER — LIDOCAINE 4 G/100G
10 CREAM TOPICAL ONCE
Refills: 0 | Status: COMPLETED | OUTPATIENT
Start: 2020-08-20 | End: 2020-08-20

## 2020-08-20 RX ORDER — FAMOTIDINE 10 MG/ML
20 INJECTION INTRAVENOUS ONCE
Refills: 0 | Status: COMPLETED | OUTPATIENT
Start: 2020-08-20 | End: 2020-08-20

## 2020-08-20 RX ADMIN — LIDOCAINE 10 MILLILITER(S): 4 CREAM TOPICAL at 21:12

## 2020-08-20 RX ADMIN — FAMOTIDINE 20 MILLIGRAM(S): 10 INJECTION INTRAVENOUS at 21:12

## 2020-08-20 RX ADMIN — Medication 30 MILLILITER(S): at 21:12

## 2020-08-20 NOTE — ED PROVIDER NOTE - PHYSICAL EXAMINATION
Gen: AAOx3, non-toxic  Head: NCAT  HEENT: EOMI, oral mucosa moist, normal conjunctiva  Lung: CTAB, no respiratory distress, no wheezes/rhonchi/rales B/L, speaking in full sentences  CV: RRR, no murmurs, rubs or gallops  Abd: suprapubic TTP, no guarding, no CVA tenderness  MSK: no visible deformities  Neuro: No focal sensory or motor deficits, normal CN exam   Skin: Warm, well perfused, no rash  Psych: normal affect.

## 2020-08-20 NOTE — ED PROVIDER NOTE - CLINICAL SUMMARY MEDICAL DECISION MAKING FREE TEXT BOX
30 M from [facility] home with hx of ADHD, mod intellectual disability, mitral valve disease, prostate enlargement with frequent caths presents with chest pain for 2 hours in the setting of recent spicy food and urinary retention. Vitals WNL Suprapubic TTP. Chest likely GI related, less likely cardiac in origin, however given heart disease....... 30 M from [facility] home with hx of ADHD, mod intellectual disability, mitral valve disease, prostate enlargement with frequent caths presents with chest pain for 2 hours in the setting of recent spicy food and urinary retention. Vitals WNL Suprapubic TTP. Chest likely GI related, less likely cardiac in origin. Urinary retention might be secondary to BPH vs UTI. Will check UA, UC, will check renal function, provide anti-acids and re-candace. Likely dc with urology f/u

## 2020-08-20 NOTE — ED PROVIDER NOTE - OBJECTIVE STATEMENT
30 M from [facility] home with hx of ADHD, mod intellectual disability, mitral valve disease, prostate enlargement with frequent caths presents with chest pain for 2 hours in the setting of recent spicy food. Reports constant, non-radiating substernal chest tightness and burning sensation, 8/10 with no associated palpitations, shortness of breath or lightheadedness. No rashes. Denies cocaine. Reports his mother had an open heart surgery at unknown age. Reports dysuria for 2 days and inability to urinate. Reports suprapubic pain. Was testes for STDs recently with neg results.

## 2020-08-20 NOTE — ED ADULT NURSE NOTE - OBJECTIVE STATEMENT
A&Ox3 and ambulatory. Pt. has c/o midsternal CP that began after he had eaten spicy foods. EKG performed, NSR. HE also had c/o bladder pain d/t retention. Pt. states that he hasn't urinated in two weeks. 14Fr indwelling Sarabia placed, 350ml drained. Sarabia draining clear yellow urine. Pt. endorsed alleviation of pain after catheter inserted. IV 20G inserted LAC. A&Ox3 and ambulatory. Pt. has c/o midsternal CP that began after he had eaten spicy foods. EKG performed, NSR. HE also had c/o bladder pain d/t retention. Pt. states that he hasn't urinated in two weeks. 14Fr indwelling Sarabia placed, 350ml drained. Sarabia draining clear yellow urine. Pt. endorsed alleviation of pain after catheter inserted. IV 20G inserted LAC. Report given to primary RN.

## 2020-08-20 NOTE — ED PROCEDURE NOTE - PROCEDURE ADDITIONAL DETAILS
21229, Ultrasound limited retroperitoneum  Focused ED ultrasound of kidneys and bladder  Indication: unable to void 2 days  bladder 255mL  bilateral renal ultrasound:  no hydronephrosis.  no visualized cysts  impression: distended urinary bladder. no hydronephrosis.

## 2020-08-20 NOTE — ED PROVIDER NOTE - NS ED ROS FT
GENERAL: No fever or chills  EYES: no change in vision  HEENT: no trouble swallowing or speaking  CARDIAC: see hpi  PULMONARY: no cough or SOB  GI: see hpi  : see hpi  SKIN: no rashes  NEURO: no headache, numbness, or weakness.  MSK: No joint pain

## 2020-08-20 NOTE — ED PROVIDER NOTE - ATTENDING CONTRIBUTION TO CARE
30 year old male past medical history intellectual disability, factitious disorder, mood disorder, hypothyroidism, GERD, asthma presents for lower chest pain after eating spicy food. Pain is constant and burning in nature feels like gerd symptoms. Also reports as been unable to urinate for 2 days. Report has happened to him in the past and has required catheterization. Reports suprapubic discomfort. Denies any back pain, numbness, weakness, recent trauma. Exam as above. 1) chest pain- typical for patients gerd-symptom relief, reassess. 2) urinary retention: bladder distended but lower volume than expected without urination for 2days. Will check labs, mckeon insertion.

## 2020-08-20 NOTE — ED PROVIDER NOTE - PATIENT PORTAL LINK FT
You can access the FollowMyHealth Patient Portal offered by [facility] by registering at the following website: [email or web address]. By joining [employer]’s FollowMyHealth portal, you will also be able to view your health information using other applications (apps) compatible with our system. You can access the FollowMyHealth Patient Portal offered by [facility] by registering at the following website: [email or web address]. By joining [employer]’s FollowMyHealth portal, you will also be able to view your health information using other applications (apps) compatible with our system.

## 2020-08-20 NOTE — PROVIDER CONTACT NOTE (OTHER) - ASSESSMENT
transportation is ambulette and that pt travels independently. Clinical provider is in agreement with ambulette back to group home. Transportation coordinated via [employer] online auth . SW arranged ambulette via [employer] ([phone]) and spoke to Randi. Trip ID  transportation is ambulette and that pt travels independently. Clinical provider is in agreement with ambulette back to group home. Transportation coordinated via [employer] online auth . SW arranged ambulette via [employer] ([phone]) and spoke to Jerry. Trip ID: [id number]  : 12:30am. Group home made aware of trip time. Verbal huddle completed.

## 2020-08-20 NOTE — ED PROVIDER NOTE - PROGRESS NOTE DETAILS
Sarabia in place. Reports improvement. Chest pain improved. Will dc with urology f/u Vladimir Yepez DO: spoke with Christina whitaker at [facility]. she states Markus can't be returned to group home with catheter. patient often will cause mild self harm in order to be able to be transported to EDs. She reports pt is on 1:1 in home and has definitely been urinating the past 2 days. will attempt TOV in ED. BRIGITTE ED ATTENDING- mckeon removed and patient able to urinate in ED and feels comfortable going home to group home facility. awaiting transportation home at this time

## 2020-08-20 NOTE — ED ADULT TRIAGE NOTE - DOMESTIC TRAVEL HIGH RISK QUESTION
RETURN TO WORK    April 2, 2018  Agata Castaneda  1968    To whom it may concern    This is to certify that the above named patient has been under my care.    Agata Castaneda was seen in the Orthopedic Department on April 2, 2018.  Please excuse patient from any work/school missed until recheck next week.    Thank you,    SIGNATURE:____________________________________________________                                                                 LEXY Rodriguez      [facility]  [address] [place] [address]    [phone]  F  [phone]    [facility]  [id number] [place] [id number]  T  [phone]  F  [phone]      
No

## 2020-08-20 NOTE — ED ADULT NURSE NOTE - CHIEF COMPLAINT QUOTE
Pt from [facility]. pt co chest pain and tightness use his inhaler with no relief.  pt also reports lower lip pain has a cold sore. Pt was upset threatened to hurt himself because got into argument with his girlfriend but now he denies it.

## 2020-08-20 NOTE — PROVIDER CONTACT NOTE (OTHER) - BACKGROUND
Per MD Linares, pt is cleared and is able to return to their previous residence, [facility] ([address] [place] [address]). SW has spoken to Zain, Staff ([phone]). SW confirmed pts mode of

## 2020-08-20 NOTE — ED ADULT TRIAGE NOTE - CHIEF COMPLAINT QUOTE
Pt from [facility]. pt co chest pain and tightness use his inhaler with no relief.  pt also reports lower lip pain has a cold sore. Pt was upset threatened to hurt himself because got into argument with his girlfriend but now he denies it.

## 2020-08-21 VITALS
RESPIRATION RATE: 16 BRPM | SYSTOLIC BLOOD PRESSURE: 133 MMHG | OXYGEN SATURATION: 100 % | HEART RATE: 77 BPM | TEMPERATURE: 98 F | DIASTOLIC BLOOD PRESSURE: 74 MMHG

## 2020-08-21 PROBLEM — H52.13 MYOPIA, BILATERAL: Chronic | Status: ACTIVE | Noted: 2020-08-02

## 2020-08-21 PROBLEM — R32 UNSPECIFIED URINARY INCONTINENCE: Chronic | Status: ACTIVE | Noted: 2020-08-02

## 2020-08-21 LAB
CULTURE RESULTS: NO GROWTH — SIGNIFICANT CHANGE UP
SPECIMEN SOURCE: SIGNIFICANT CHANGE UP

## 2020-08-21 NOTE — ED ADULT NURSE REASSESSMENT NOTE - NS ED NURSE REASSESS COMMENT FT1
urinary mckeon catheter discontinued as per order. Patient urinated after mckeon was discontinued. respirations even and unlabored. Stretcher in lowest position, wheels locked, side rails up as per protocol. Call bell in reach. Will continue to monitor. Mckeon catheter output 1,000 CC. urinary mckeon catheter discontinued as per order. Patient urinated after mckeon was discontinued. respirations even and unlabored. Stretcher in lowest position, wheels locked, side rails up as per protocol. Call bell in reach. Will continue to monitor.

## 2020-08-28 ENCOUNTER — EMERGENCY (EMERGENCY)
Facility: HOSPITAL | Age: 30
LOS: 1 days | Discharge: HOME | End: 2020-08-28
Attending: EMERGENCY MEDICINE | Admitting: EMERGENCY MEDICINE
Payer: MEDICAID

## 2020-08-28 ENCOUNTER — EMERGENCY (EMERGENCY)
Facility: HOSPITAL | Age: 30
LOS: 1 days | Discharge: HOME | End: 2020-08-28
Attending: EMERGENCY MEDICINE
Payer: MEDICAID

## 2020-08-28 VITALS
OXYGEN SATURATION: 97 % | DIASTOLIC BLOOD PRESSURE: 80 MMHG | HEIGHT: 73 IN | SYSTOLIC BLOOD PRESSURE: 126 MMHG | RESPIRATION RATE: 18 BRPM | WEIGHT: 179.9 LBS | HEART RATE: 70 BPM | TEMPERATURE: 98 F

## 2020-08-28 VITALS
RESPIRATION RATE: 16 BRPM | HEART RATE: 67 BPM | HEIGHT: 73 IN | DIASTOLIC BLOOD PRESSURE: 66 MMHG | TEMPERATURE: 98 F | OXYGEN SATURATION: 96 % | SYSTOLIC BLOOD PRESSURE: 108 MMHG

## 2020-08-28 DIAGNOSIS — F43.24 ADJUSTMENT DISORDER WITH DISTURBANCE OF CONDUCT: ICD-10-CM

## 2020-08-28 DIAGNOSIS — F79 UNSPECIFIED INTELLECTUAL DISABILITIES: ICD-10-CM

## 2020-08-28 PROCEDURE — 99283 EMERGENCY DEPT VISIT LOW MDM: CPT | Mod: 25

## 2020-08-28 PROCEDURE — 29125 APPL SHORT ARM SPLINT STATIC: CPT | Mod: RT

## 2020-08-28 PROCEDURE — 73130 X-RAY EXAM OF HAND: CPT | Mod: 26,50

## 2020-08-28 PROCEDURE — 99283 EMERGENCY DEPT VISIT LOW MDM: CPT

## 2020-08-28 RX ORDER — MORPHINE SULFATE 50 MG/1
4 CAPSULE, EXTENDED RELEASE ORAL ONCE
Refills: 0 | Status: DISCONTINUED | OUTPATIENT
Start: 2020-08-28 | End: 2020-08-28

## 2020-08-28 RX ORDER — ACETAMINOPHEN 500 MG
650 TABLET ORAL ONCE
Refills: 0 | Status: COMPLETED | OUTPATIENT
Start: 2020-08-28 | End: 2020-08-28

## 2020-08-28 RX ADMIN — Medication 650 MILLIGRAM(S): at 13:16

## 2020-08-28 NOTE — ED PROVIDER NOTE - NSFOLLOWUPINSTRUCTIONS_ED_ALL_ED_FT
Rest, ice, and elevate extremity.   May take Tylenol 650mg every 4 hours as needed for pain.   Use ACE wrap and sling during the day.  Avoid outbursts and discuss with staff any concerns before causing physical harm to help or others.

## 2020-08-28 NOTE — ED PROVIDER NOTE - NS ED ROS FT
General: denies fever, chills  HENT: denies nasal congestion, rhinorrhea  Eyes: denies visual changes, blurred vision  CV: denies chest pain, palpitations  Resp: denies difficulty breathing, cough  Abdominal: denies nausea, vomiting, diarrhea, abdominal pain  MSK: b/l hand pains

## 2020-08-28 NOTE — ED BEHAVIORAL HEALTH ASSESSMENT NOTE - SAFETY PLAN DETAILS
call 911 or return to ED call 911 or return to ED if symptoms worsen or if pt develops urge to harm self or others

## 2020-08-28 NOTE — ED BEHAVIORAL HEALTH NOTE - BEHAVIORAL HEALTH NOTE
Worker called patient’s group home [facility] and spoke to Sindy Adair PHAN ([phone]) who provided collateral information. All information is as per Ms. Borrero:    Patient is a 30 year old male, domiciled at [facility], on 1:1 supervision at the [facility], with a diagnosis of impulse control disorder, factious disorder, mild intellectual disability, and asthma, BIB as a walk in for aggressive behaviors. Ms. Borrero states that the patient has been back and forth from the hospital. She states that this morning the patient pulled his cast off his hand. She states that the patient became upset and requested another dosage of his morphine. She states that staff is only supposed to give the dosage that the patient is prescribed and the patient was requesting more morphine. Ms. Borrero states that the patient went to [facility] yesterday and received a brace for his hand. She states that this morning the patient refused his medication and only wanted the pain medication. She states that staff tried to  the patient and tried to explain about the medication dosage. She states that the patient got upset today and punched the wall with his other hand and broke the wall because he wanted medication. She states that the patient did not hurt anyone but was hurting himself by punching the wall. She states that the patient was agitated towards staff but was not violent towards staff. She states that the patient is on a 1:1 supervision at the residence and has a 1:1 at bedside (Dominick).  She states that the patient sees a psychiatrist Dr. Duffy ([phone]) on a monthly basis. She states that the patient is having surgery for his thumb on Sept 2, 2020.  Worker then received a call from Steve Valentin from the [facility] CR[facility]IS ([phone]) who states that patient should be evaluated psychiatrically. Worker explained that patient was evaluated psychiatrically and cleared for discharge. Worker explained that [facility] does not have capacity for Cpep. Patient is encouraged to follow up with his psychiatrist Dr. Duffy. Case discussed with Dr. Farrell. Worker called patient’s group home [facility] and spoke to Sindy PHAN ([phone]) who provided collateral information. All information is as per Ms. Borrero:    Patient is a 30 year old male, domiciled at [facility], on 1:1 supervision at the [facility], with a diagnosis of impulse control disorder, factious disorder, mild intellectual disability, and asthma, BIB as a walk in for aggressive behaviors. Ms. Borrero states that the patient has been back and forth from the hospital. She states that this morning the patient pulled his cast off his hand. She states that the patient became upset and requested another dosage of his morphine. She states that staff is only supposed to give the dosage that the patient is prescribed and the patient was requesting more morphine. Ms. Borrero states that the patient went to [facility] yesterday and received a brace for his hand. She states that this morning the patient refused his medication and only wanted the pain medication. She states that staff tried to  the patient and tried to explain about the medication dosage. She states that the patient got upset today and punched the wall with his other hand and broke the wall because he wanted medication. She states that the patient did not hurt anyone but was hurting himself by punching the wall. She states that the patient was agitated towards staff but was not violent towards staff. She states that the patient is on a 1:1 supervision at the residence and has a 1:1 at bedside (Dominick).  She states that the patient sees a psychiatrist Dr. Duffy ([phone]) on a monthly basis. She states that the patient is having surgery for his thumb on Sept 2, 2020.  Worker then received a call from Steve Valentin from the [facility] CRISIS ([phone]) who states that patient should be evaluated psychiatrically. Worker explained that patient was evaluated psychiatrically and cleared for discharge. Worker explained that [facility] does not have capacity for Cpep. Patient is encouraged to follow up with his psychiatrist Dr. Duffy. Case discussed with Dr. Hernandez.    Per Dr. hernandez, pt is cleared and is able to return to their previous residence, [facility] ([address] [place] [address]). SW has spoken to Staff Aubrey ([phone]). KEKE confirmed pts mode of  transportation is ambulette and that pt travels independently.  She states that patient's 1:1 will drive behind the ambulette. Clinical provider is in agreement with ambulette back to group home. Transportation coordinated via [facility] invoice [id number] KEKE arranged ambulette via [employer] ([phone]) and spoke to Prashanth Acuna.   : 2:45 pm. Group home made aware of trip time. Verbal huddle completed. Worker then called senior ride ([phone]) and spoke to Dawna who states that she noted that  should call group home when outside the residence. Worker called patient’s group home [facility] and spoke to Sindy PHAN ([phone]) who provided collateral information. All information is as per Ms. Borrero:    Patient is a 30 year old male, domiciled at [facility], on 1:1 supervision at the longterm, with a diagnosis of impulse control disorder, factious disorder, mild intellectual disability, and asthma, BIB as a walk in for aggressive behaviors. Ms. Borrero states that the patient has been back and forth from the hospital. She states that this morning the patient pulled his cast off his hand. She states that the patient became upset and requested another dosage of his morphine. She states that staff is only supposed to give the dosage that the patient is prescribed and the patient was requesting more morphine. Ms. Borrero states that the patient went to [facility] yesterday and received a brace for his hand. She states that this morning the patient refused his medication and only wanted the pain medication. She states that staff tried to  the patient and tried to explain about the medication dosage. She states that the patient got upset today and punched the wall with his other hand and broke the wall because he wanted medication. She states that the patient did not hurt anyone but was hurting himself by punching the wall. She states that the patient was agitated towards staff but was not violent towards staff. She states that the patient is on a 1:1 supervision at the residence and has a 1:1 at bedside (Dominick).  She states that the patient sees a psychiatrist Dr. Duffy ([phone]) on a monthly basis. She states that the patient is having surgery for his thumb on Sept 2, 2020.  Worker then received a call from Steve Valentin from the [facility] CRISIS ([phone]) who states that patient should be evaluated psychiatrically. Worker explained that patient was evaluated psychiatrically and cleared for discharge. Worker explained that [facility] does not have capacity for Cpep. Patient is encouraged to follow up with his psychiatrist Dr. Duffy. Case discussed with Dr. Hernandez.    Per Dr. hernandez, pt is cleared and is able to return to their previous residence, [facility] ([address] [place] [address]). SW has spoken to Staff Aubrey ([phone]). KEKE confirmed pts mode of  transportation is ambulette and that pt travels independently.  She states that patient's 1:1 will drive behind the ambulette. Clinical provider is in agreement with ambulette back to group home. Transportation coordinated via [facility] invoice [id number] KEKE arranged ambulette via [employer] ([phone]) and spoke to Prashanth Acuna.   : 2:45 pm. Group home made aware of trip time. Verbal huddle completed. Worker then called senior ride ([phone]) and spoke to Dawna who states that she noted that  should call group home when outside the residence. Worker discussed discharge with Dominick staff member at bed side and he state he will follow behind in his own car when patient is traveling home. Worker met with patient and provided patient with coping skills at the residence. He states he does not like two other clients in the home. Worker provided support around this and spoke about  ways to de-escalate his anger (doing activities in his own room (own space). Worker also encouraged patient to follow up with his psychiatrist. Worker called patient’s group home [facility] and spoke to Sindy PHAN ([phone]) who provided collateral information. All information is as per Ms. Borrero:    Patient is a 30 year old male, domiciled at [facility], on 1:1 supervision at the longterm, with a diagnosis of impulse control disorder, factious disorder, mild intellectual disability, and asthma, BIB as a walk in for aggressive behaviors. Ms. Borrero states that the patient has been back and forth from the hospital. She states that this morning the patient pulled his cast off his hand. She states that the patient became upset and requested another dosage of his morphine. She states that staff is only supposed to give the dosage that the patient is prescribed and the patient was requesting more morphine. Ms. Borrero states that the patient went to [facility] yesterday and received a brace for his hand. She states that this morning the patient refused his medication and only wanted the pain medication. She states that staff tried to  the patient and tried to explain about the medication dosage. She states that the patient got upset today and punched the wall with his other hand and broke the wall because he wanted medication. She states that the patient did not hurt anyone but was hurting himself by punching the wall. She states that the patient was agitated towards staff but was not violent towards staff. She states that the patient is on a 1:1 supervision at the residence and has a 1:1 at bedside (Dominick).  She states that the patient sees a psychiatrist Dr. Duffy ([phone]) on a monthly basis. She states that the patient is having surgery for his thumb on Sept 2, 2020.  Worker then received a call from Steve Valentin from the [facility] CRISIS ([phone]) who states that patient should be evaluated psychiatrically. Worker explained that patient was evaluated psychiatrically and cleared for discharge. Worker explained that [facility] does not have capacity for Cpep. Patient is encouraged to follow up with his psychiatrist Dr. Duffy. Case discussed with Dr. Hernandez.    Per Dr. hernandez, pt is cleared and is able to return to their previous residence, [facility] ([address] [place] [address]). SW has spoken to Staff Aubrey ([phone]). KEKE confirmed pts mode of  transportation is ambulette and that pt travels independently.  She states that patient's 1:1 will drive behind the ambulette. Clinical provider is in agreement with ambulette back to group home. Transportation coordinated via [facility] invoice [id number] KEKE arranged ambulette via [employer] ([phone]) and spoke to Prashanth Acuna.   : 2:45 pm. Group home made aware of trip time. Verbal huddle completed. Worker then called [facility] ([phone]) and spoke to Dawna who states that she noted that  should call group home when outside the residence. Worker discussed discharge with Dominick staff member at bed side and he state he will follow behind in his own car when patient is traveling home. Worker met with patient and provided patient with coping skills at the residence. He states he does not like two other clients in the home. Worker provided support around this and spoke about  ways to de-escalate his anger (doing activities in his own room (own space). Worker also encouraged patient to follow up with his psychiatrist. Worker spoke with Director Cassy Pak ([phone]) and informed of patient's discharge. She states that patient can travel back via ambulette.

## 2020-08-28 NOTE — PROVIDER CONTACT NOTE (OTHER) - BACKGROUND
SW called [facility] group home and spoke with Roseline, to confirm that pt. is resident ([phone] [place] [address], [place] [address]) and that pt. travels independently via ambulette.

## 2020-08-28 NOTE — ED BEHAVIORAL HEALTH ASSESSMENT NOTE - RISK ASSESSMENT
risk factors: male, single, hx NSSIB, impulsivity  protective factors: domiciled, no hx SA, denies access to guns/weapons, denies insomnia, denies SI/HI, future-oriented, help-seeking Low Acute Suicide Risk Pt is at chronically elevated risk of harm to self/others given h/o self-harm, h/o aggression, poor impulse control and poor frustration tolerance due to intellectual disability,   protective factors: domiciled, no hx SA, on 1:1 supervision at group home, no access to guns/weapons, denies insomnia, denies SI/HI, future-oriented, help-seeking.   Pt is not at acutely elevated risk of harm to self or others.

## 2020-08-28 NOTE — ED BEHAVIORAL HEALTH ASSESSMENT NOTE - SUICIDE RISK FACTORS
Mood Disorder current/past/Impulsivity/Other Other/Conduct problems current/past/Mood Disorder current/past/Impulsivity

## 2020-08-28 NOTE — ED PROVIDER NOTE - PHYSICAL EXAMINATION
GENERAL: well appearing in no acute distress, non-toxic appearing  HEAD: normocephalic, atraumatic  HENT: oral mucosa moist, uvula midline, no tonsilar exudates, neck supple, no JVD  Eyes: Normal conjunctiva, PERRLA, EOMI  CARDIAC: regular rate and rhythm, normal S1S2, no appreciable murmurs, 2+ pulses in UE/LE b/l  PULM: normal breath sounds, clear to ascultation bilaterally, no rales, rhonchi, wheezing  GI: abdomen nondistended, soft, nontender, no guarding, rebound tenderness  NEURO: no focal motor or sensory deficits, normal speech, normal gait, AAOx3  MSK: right hand tender to palpation on the 4th and 5th knuckle; inability to fully make a fist; right thumb inability to flex; left dorsal hand tender to palpation but ROM intact. neurovascularly intact b/l  Skin: multiple abrasions and scratches on the dorsal aspect of both hands

## 2020-08-28 NOTE — ED PROVIDER NOTE - CARE PLAN
Principal Discharge DX:	Injury of right hand, initial encounter  Secondary Diagnosis:	Hand injury, left, initial encounter

## 2020-08-28 NOTE — ED BEHAVIORAL HEALTH ASSESSMENT NOTE - ACTIVATING EVENTS/STRESSORS
Acute medical problem Triggering events leading to humiliation, shame, and/or despair (e.g. Loss of relationship, financial or health status) (real or anticipated)

## 2020-08-28 NOTE — ED ADULT NURSE NOTE - OBJECTIVE STATEMENT
Pt presents to ED from [facility] with c/o hand pain after punching a wall. Per EMS pt was upset because he wasn't able to see his fiance who lives on a different floor of the group home, so he punched a wall and scratched his wrist with a screw. Pt denies head neck or back pain, LOC. Pt states he broke his L first digit a week ago after punching a wall. Pt presents to ED from [facility] with c/o hand pain after punching a wall. Per EMS pt was upset because he wasn't able to see his fiance who lives on a different floor of the [facility] home, so he punched a wall and scratched his wrist with a screw. Abrasions noted to L wrist, bleeding controlled prior to arrival. Pt denies head neck or back pain, LOC. Pt states he broke his L first digit a week ago after punching a wall.

## 2020-08-28 NOTE — ED BEHAVIORAL HEALTH ASSESSMENT NOTE - VIOLENCE PROTECTIVE FACTORS:
Good treatment response/compliance/Residential stability/Engagement in treatment Sobriety/Engagement in treatment/Good treatment response/compliance/Residential stability/Relationship stability

## 2020-08-28 NOTE — ED PROVIDER NOTE - CLINICAL SUMMARY MEDICAL DECISION MAKING FREE TEXT BOX
30 year old malewith thumb pain for splint. vitals WNL. PE as above.  placed in thumb spica splint as pt more likely to keep this on. already has hand surgery follow up for surgery. advised he must keep this on at all time. will dc. f/u hand. return precautions discussed.

## 2020-08-28 NOTE — ED BEHAVIORAL HEALTH ASSESSMENT NOTE - OTHER
[facility] group home staff member peers cognitive impairment "constipation" defer to med ED concrete limited due to intellectual disability copy of safety plan to be provided intellectual disability currently intact chronically limited due to intellectual disability group home staff

## 2020-08-28 NOTE — ED ADULT NURSE NOTE - CHIEF COMPLAINT QUOTE
pt from [facility] group home brought in after punching a wall because they would not let him see his fiance who resides on another fall. Deformity noted to R hand.

## 2020-08-28 NOTE — ED BEHAVIORAL HEALTH ASSESSMENT NOTE - SUMMARY
The patient is a 30-year-old male; domiciled at [facility]; PPHx of mood disorder, ID, factitious disorder, past admissions, no known hx SA, hx NSSIB; no known legal hx; BIB EMS activated by self for constipation; psychiatry consulted for CAH.  Pt denying all psychiatric complaints at this time and is requesting discharge.  Pt does not appear acutely depressed, psychotic, manic, anxious, intoxicated, or agitation.  He has been calm and cooperative per ED staff.  Pt does not meet criteria for involuntary admission at this time.  He remains at chronically elevated risk given hx of ID, impulsivity, poor frustration tolerance. 30-year-old male; domiciled at [facility]; PPHx of intellectual disability, mood disorder, factitious disorder, past admissions, no known hx SA, +hx NSSIB; no known legal hx, +h/o aggression, BIB EMS activated by [facility] [employer] [facility] staff for becoming agitated, punching a wall, and scratching his hand with a screw in context of being angry at his mother.  Pt is at chronically elevated risk of harm to self/others given poor impulse control and poor frustration tolerance secondary to intellectual disability. Pt has chronic history of engaging in self-harm behaviors and becoming agitated/aggressive when frustrated/angry/needs are not met.   Pt is currently calm and in good behavioral control, and he currently denies SI/HI. Pt does not present an acute danger to self or others and would not benefit from inpatient psychiatric admission at this time.

## 2020-08-28 NOTE — ED PROVIDER NOTE - PROGRESS NOTE DETAILS
Dr. Werner: X-ray shows no fracture. Hands were cleaned and dressed. Pt placed in ACE wrap and sling. Pt seen and evaluated by me Psych who felt pt has no acute findings to warrant admission. Social Work consulted to assist with transfer back to facility. Dr. Werner: Contacted the  Cassy Pak and discussed his outbursts. Discussed no current acute medical concerns for admission. Will have Psych and SW reach out to MsAranza Pasha to discuss further the issues with the group home. Staff member at bedside. Dr. Werner: Pt placed back in thumb spica to right hand and ACE wrap. Advised f/u with Ortho.
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
## 2020-08-28 NOTE — ED BEHAVIORAL HEALTH ASSESSMENT NOTE - PRIMARY DX
Mood disorder Deferred condition on axis II Adjustment disorder with disturbance of conduct Intellectual disability

## 2020-08-28 NOTE — ED BEHAVIORAL HEALTH ASSESSMENT NOTE - NS ED BHA MED ROS CARDIOVASCULAR
Additional Notes: For itching - capsaicin cream otc Additional Notes: Several papillomas removed by scissors excision. Additional Notes: Mid chest,left neck - cryotherapy and curettage. Additional Notes: For cyst- left ala intralesional Kenalog 2.5 mg/ ml No complaints

## 2020-08-28 NOTE — ED PROVIDER NOTE - OBJECTIVE STATEMENT
Patient is a 29yo M coming in from [facility] for hand pain. He was discharged earlier this morning w/ a thumb spica s/p punching a locker. He's returning after repeatedly punching a wall because he was angry w/ his fiance for "not sleeping with him" and angry with his mom who yelled at him over facetime. Patient states he has right knuckle pain and an inability to make a fist.

## 2020-08-28 NOTE — ED BEHAVIORAL HEALTH ASSESSMENT NOTE - HPI (INCLUDE ILLNESS QUALITY, SEVERITY, DURATION, TIMING, CONTEXT, MODIFYING FACTORS, ASSOCIATED SIGNS AND SYMPTOMS)
The patient is a 30-year-old male; domiciled at [facility]; PPHx of mood disorder, ID, factitious disorder, past admissions, no known hx SA, hx NSSIB; no known legal hx; BIB EMS activated by self for constipation; psychiatry consulted for CAH.  Pt states that he called the ambulance due to constipation but now he feels better and wants to go home.  Pt denies current SI/HI/AVH.  When asked about expression of CAH, he states that he heard voices last night but not now.  When asked what the voices were saying last night, pt says they were telling him to kill himself, though says this with a smile on his face.  Pt states he hears these voices daily and are not new.  He denies access to guns/weapons.  He denies that cutting/scratching behaviors are suicidal in nature.  He denies other depressive, psychotic, manic symptoms.  Pt reports drinking 1 can of beer daily but denies other substance or tobacco use. 30-year-old male; domiciled at [facility]; PPHx of intellectual disability, mood disorder, factitious disorder, past admissions, no known hx SA, +hx NSSIB; no known legal hx, +h/o aggression, [facility] for becoming agitated, punching a wall, and scratching his hand with a screw.   Of note, pt was evaluated in ED and discharged earlier this morning (8/28/20) w/ a right thumb spica s/p punching a locker. Pt now presents after becoming agitated at group [place], punched a wall, and scratched his hand with a screw.   On interview, pt is calm and in good behavioral control. States he punched a wall and scratched himself (superficial scratches noted on left thumb) with a screw because he was angry at his mother. States he got angry because she yelled at him when they spoke today. Pt realizes he should not have punched wall or self-harmed. He currently denies thoughts of self-harm, denies suicidal ideation, intent, or plan. He denies homicidal or violent ideation, intent, or plan. He denies symptoms of psychosis, everett, or depression. He denies substance use.   See  note for collateral.

## 2020-08-28 NOTE — ED BEHAVIORAL HEALTH ASSESSMENT NOTE - SUICIDE PROTECTIVE FACTORS
Identifies reasons for living/Has future plans/Responsibility to family and others/Supportive social network of family or friends/Positive therapeutic relationships

## 2020-08-28 NOTE — ED PROVIDER NOTE - PATIENT PORTAL LINK FT
You can access the FollowMyHealth Patient Portal offered by [facility] by registering at the following website: [email or web address]. By joining [facility]’s FollowMyHealth portal, you will also be able to view your health information using other applications (apps) compatible with our system.

## 2020-08-28 NOTE — ED ADULT TRIAGE NOTE - CHIEF COMPLAINT QUOTE
pt from [facility] group home brought in after punching a wall because they would not let him see his fiance who resides on another fall. Deformity noted to R hand.
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
## 2020-08-28 NOTE — ED PROVIDER NOTE - CLINICAL SUMMARY MEDICAL DECISION MAKING FREE TEXT BOX
29y/o male with PMhx of Asthma, GERD, MR, and Impulse Control presented to the ED from a group home for b/l hand injury.   Concern for hand/wrist fracture/strain/abrasions/impulse control  X-ray, Analgesia, Psych eval

## 2020-08-28 NOTE — ED PROVIDER NOTE - ATTENDING CONTRIBUTION TO CARE
Pt was seen and evaluated by me. Pt is a 31y/o male with PMhx of Asthma, GERD, MR, and Impulse Control presented to the ED from a group home for b/l hand injury. Pt was noted by aid with pt that pt was repeatedly punching a wall because he was angry. Pt states he was angry with his fiance for "not sleeping with him" and angry with his mom who yelled at him over facetime. Pt admits to b/l hand pain. Pt was seen previous for punching a locker. Pt denies any SI, HI, or hallucinations. Lungs CTA b/l. RRR. Abd soft, non-tender. No tenderness to elbows. Tenderness to right hand at the 4th and 5th MCP. Left hand with tenderness around areas of abrasions. Multiple abrasions to b/l hands. Tdap UTD.  Concern for hand/wrist fracture/strain/abrasions/impulse control  X-ray, Analgesia, Psych eval

## 2020-08-28 NOTE — ED BEHAVIORAL HEALTH ASSESSMENT NOTE - DESCRIPTION
He was discharged earlier this morning w/ a thumb catherine s/p punching a locker. He's returning after repeatedly punching a wall because he was angry w/ his fiance for "not sleeping with him" and angry with his mom who yelled at him over facetime. asthma lives in [facility] calm, cooperative, and in good behavioral control throughout ED course; did not require prn medications or restraints    Vital Signs Last 24 Hrs  T(C): 36.8 (28 Aug 2020 10:30), Max: 36.8 (28 Aug 2020 10:30)  T(F): 98.3 (28 Aug 2020 10:30), Max: 98.3 (28 Aug 2020 10:30)  HR: 67 (28 Aug 2020 10:30) (67 - 70)  BP: 108/66 (28 Aug 2020 10:30) (108/66 - 126/80)  BP(mean): --  RR: 16 (28 Aug 2020 10:30) (16 - 18)  SpO2: 96% (28 Aug 2020 10:30) (96% - 97%)

## 2020-08-28 NOTE — ED BEHAVIORAL HEALTH ASSESSMENT NOTE - CURRENT MEDICATION
per last behavioral health assessment note in June 2020: divalproex; fluoxetine, prazosin, synthroid, simvastatin

## 2020-08-28 NOTE — ED PROVIDER NOTE - PATIENT PORTAL LINK FT
You can access the FollowMyHealth Patient Portal offered by [facility] by registering at the following website: [email or web address]. By joining [employer]’s FollowMyHealth portal, you will also be able to view your health information using other applications (apps) compatible with our system.

## 2020-09-18 ENCOUNTER — EMERGENCY (EMERGENCY)
Facility: HOSPITAL | Age: 30
LOS: 1 days | Discharge: HOME | End: 2020-09-18
Attending: EMERGENCY MEDICINE | Admitting: EMERGENCY MEDICINE
Payer: MEDICAID

## 2020-09-18 VITALS
SYSTOLIC BLOOD PRESSURE: 146 MMHG | DIASTOLIC BLOOD PRESSURE: 92 MMHG | RESPIRATION RATE: 18 BRPM | OXYGEN SATURATION: 98 % | TEMPERATURE: 98 F | HEART RATE: 94 BPM

## 2020-09-18 VITALS
HEART RATE: 99 BPM | TEMPERATURE: 98 F | HEIGHT: 73 IN | RESPIRATION RATE: 18 BRPM | DIASTOLIC BLOOD PRESSURE: 85 MMHG | OXYGEN SATURATION: 99 % | SYSTOLIC BLOOD PRESSURE: 123 MMHG

## 2020-09-18 PROCEDURE — 99283 EMERGENCY DEPT VISIT LOW MDM: CPT

## 2020-09-18 RX ORDER — OXYCODONE AND ACETAMINOPHEN 5; 325 MG/1; MG/1
1 TABLET ORAL ONCE
Refills: 0 | Status: DISCONTINUED | OUTPATIENT
Start: 2020-09-18 | End: 2020-09-18

## 2020-09-18 RX ADMIN — OXYCODONE AND ACETAMINOPHEN 1 TABLET(S): 5; 325 TABLET ORAL at 02:38

## 2020-09-18 NOTE — ED ADULT TRIAGE NOTE - NS_BH TRG QUESTION4_ED_ALL_ED
Siliq Counseling:  I discussed with the patient the risks of Siliq including but not limited to new or worsening depression, suicidal thoughts and behavior, immunosuppression, malignancy, posterior leukoencephalopathy syndrome, and serious infections.  The patient understands that monitoring is required including a PPD at baseline and must alert us or the primary physician if symptoms of infection or other concerning signs are noted. There is also a special program designed to monitor depression which is required with Siliq. No

## 2020-09-18 NOTE — ED ADULT NURSE NOTE - OBJECTIVE STATEMENT
Patient brought in from [facility] for hearing voices.  He started hearing voices yesterday but doesn't know what the voices are saying.  He was evaluated by Dr. Yuan and denied hearing voices, suicidal thoughts, homicidal thoughts or hallucinations.  Complaining of right thumb pain, he has a cast on his right hand.  Patient given pain meds per MD orders.  Will continue to monitor patient.  Staff member at bedside with patient.

## 2020-09-18 NOTE — ED PROVIDER NOTE - MUSCULOSKELETAL, MLM
Right forearm in short cast, loose, FROM at fingers and thumb with normal cap refill and warm and well perfused with normal sensation to light touch.

## 2020-09-18 NOTE — ED PROVIDER NOTE - CLINICAL SUMMARY MEDICAL DECISION MAKING FREE TEXT BOX
31 yo M that is domiciled at [facility], on 1:1 supervision at the [facility], with a diagnosis of impulse control disorder, factious disorder, mild intellectual disability, and asthma that presents with both Right thumb pain and previous SI with hallucinations that are no longer present. Only complaint now is thumb pain. Normal exam although pt in cast x 2 wks without any new trauma. Neurovascularly intact. Will provide pain meds and discharge back to group home with aide at bedside. Will rec to f/u with [facility] for cast issues. PRN pain meds at group home for further pain. NO concern for need to remove cast or new imaging. No clinical indication for labs. No need for psych consult as pt has no psych complaints at this time and does not appear to be responding to internal stimuli.

## 2020-09-18 NOTE — ED PROVIDER NOTE - OBJECTIVE STATEMENT
31 yo M that is domiciled at [facility], on 1:1 supervision at the [facility], with a diagnosis of impulse control disorder, factious disorder, mild intellectual disability, and asthma that recently had fx of RUE that is now in cast from [facility] x 2 wks that was brought in by EMS from [facility] with aide that reports right thumb pain and SI with hallucinations that are no longer there upon arrival to ED. Pt reports no longer has SI/Hallucinations and has no access to guns. Also no HI. Reports pain to right thumb due to cast tightness but denies any coldness of extremities, new trauma to thumb or paresthesias. Was given motrin 6 hours prior to arrival and pain still persists and non radiating. No other complaints.

## 2020-09-18 NOTE — ED PROVIDER NOTE - NSFOLLOWUPINSTRUCTIONS_ED_ALL_ED_FT
Please follow up with your primary care doctor after you leave the emergency department so that they can follow up and conduct more testing and treatment as they deem necessary. If you have worsening signs or symptoms of what you came in to the Emergency Department today and are not able to see your doctor, go to your nearest emergency department or return to the [facility] emergency department for further care and management.

## 2020-09-18 NOTE — ED ADULT NURSE REASSESSMENT NOTE - NS ED NURSE REASSESS COMMENT FT1
Patient transferred to group home by [facility].  Staff member at ED and will follow ambulette in personal vehicle back to group home.

## 2020-09-18 NOTE — ED ADULT TRIAGE NOTE - CHIEF COMPLAINT QUOTE
Patient hearing voices today but doesn't know what the voices are saying.  Denies SI or HI.  Group home staff with patient and unsure if patient is complaint with medications.  Patient stated to staff, he wants to stay in the hospital for a few days.  He was recently discharged from a psych facility but staff unsure of the name of the facility.  Complaining of right thumb pain, he has a cast on his right arm.

## 2020-09-18 NOTE — ED PROVIDER NOTE - PATIENT PORTAL LINK FT
You can access the FollowMyHealth Patient Portal offered by [facility] by registering at the following website: [email or web address]. By joining [email or web address]’s FollowMyHealth portal, you will also be able to view your health information using other applications (apps) compatible with our system.

## 2020-10-07 ENCOUNTER — EMERGENCY (EMERGENCY)
Facility: HOSPITAL | Age: 30
LOS: 1 days | Discharge: HOME | End: 2020-10-07
Attending: STUDENT IN AN ORGANIZED HEALTH CARE EDUCATION/TRAINING PROGRAM
Payer: MEDICAID

## 2020-10-07 VITALS
DIASTOLIC BLOOD PRESSURE: 73 MMHG | HEART RATE: 104 BPM | RESPIRATION RATE: 16 BRPM | OXYGEN SATURATION: 97 % | TEMPERATURE: 98 F | SYSTOLIC BLOOD PRESSURE: 112 MMHG | HEIGHT: 73 IN

## 2020-10-07 LAB
ALBUMIN SERPL ELPH-MCNC: 3.3 G/DL — LOW (ref 3.5–5)
ALP SERPL-CCNC: 88 U/L — SIGNIFICANT CHANGE UP (ref 40–120)
ALT FLD-CCNC: 19 U/L DA — SIGNIFICANT CHANGE UP (ref 10–60)
ANION GAP SERPL CALC-SCNC: 2 MMOL/L — LOW (ref 5–17)
APPEARANCE UR: CLEAR — SIGNIFICANT CHANGE UP
AST SERPL-CCNC: 16 U/L — SIGNIFICANT CHANGE UP (ref 10–40)
BACTERIA # UR AUTO: ABNORMAL /HPF
BASOPHILS # BLD AUTO: 0.03 K/UL — SIGNIFICANT CHANGE UP (ref 0–0.2)
BASOPHILS NFR BLD AUTO: 0.3 % — SIGNIFICANT CHANGE UP (ref 0–2)
BILIRUB SERPL-MCNC: 0.2 MG/DL — SIGNIFICANT CHANGE UP (ref 0.2–1.2)
BILIRUB UR-MCNC: NEGATIVE — SIGNIFICANT CHANGE UP
BUN SERPL-MCNC: 17 MG/DL — SIGNIFICANT CHANGE UP (ref 7–18)
CALCIUM SERPL-MCNC: 8.6 MG/DL — SIGNIFICANT CHANGE UP (ref 8.4–10.5)
CHLORIDE SERPL-SCNC: 104 MMOL/L — SIGNIFICANT CHANGE UP (ref 96–108)
CO2 SERPL-SCNC: 34 MMOL/L — HIGH (ref 22–31)
COLOR SPEC: YELLOW — SIGNIFICANT CHANGE UP
CREAT SERPL-MCNC: 0.81 MG/DL — SIGNIFICANT CHANGE UP (ref 0.5–1.3)
DIFF PNL FLD: NEGATIVE — SIGNIFICANT CHANGE UP
EOSINOPHIL # BLD AUTO: 0.1 K/UL — SIGNIFICANT CHANGE UP (ref 0–0.5)
EOSINOPHIL NFR BLD AUTO: 1.1 % — SIGNIFICANT CHANGE UP (ref 0–6)
EPI CELLS # UR: ABNORMAL /HPF
GLUCOSE SERPL-MCNC: 82 MG/DL — SIGNIFICANT CHANGE UP (ref 70–99)
GLUCOSE UR QL: NEGATIVE — SIGNIFICANT CHANGE UP
HCT VFR BLD CALC: 41 % — SIGNIFICANT CHANGE UP (ref 39–50)
HGB BLD-MCNC: 12.6 G/DL — LOW (ref 13–17)
HIV 1 & 2 AB SERPL IA.RAPID: SIGNIFICANT CHANGE UP
IMM GRANULOCYTES NFR BLD AUTO: 1.4 % — SIGNIFICANT CHANGE UP (ref 0–1.5)
KETONES UR-MCNC: NEGATIVE — SIGNIFICANT CHANGE UP
LEUKOCYTE ESTERASE UR-ACNC: NEGATIVE — SIGNIFICANT CHANGE UP
LYMPHOCYTES # BLD AUTO: 2.86 K/UL — SIGNIFICANT CHANGE UP (ref 1–3.3)
LYMPHOCYTES # BLD AUTO: 31.6 % — SIGNIFICANT CHANGE UP (ref 13–44)
MAGNESIUM SERPL-MCNC: 2.2 MG/DL — SIGNIFICANT CHANGE UP (ref 1.6–2.6)
MCHC RBC-ENTMCNC: 24.8 PG — LOW (ref 27–34)
MCHC RBC-ENTMCNC: 30.7 GM/DL — LOW (ref 32–36)
MCV RBC AUTO: 80.7 FL — SIGNIFICANT CHANGE UP (ref 80–100)
MONOCYTES # BLD AUTO: 0.95 K/UL — HIGH (ref 0–0.9)
MONOCYTES NFR BLD AUTO: 10.5 % — SIGNIFICANT CHANGE UP (ref 2–14)
NEUTROPHILS # BLD AUTO: 4.98 K/UL — SIGNIFICANT CHANGE UP (ref 1.8–7.4)
NEUTROPHILS NFR BLD AUTO: 55.1 % — SIGNIFICANT CHANGE UP (ref 43–77)
NITRITE UR-MCNC: NEGATIVE — SIGNIFICANT CHANGE UP
NRBC # BLD: 0 /100 WBCS — SIGNIFICANT CHANGE UP (ref 0–0)
PH UR: 7 — SIGNIFICANT CHANGE UP (ref 5–8)
PLATELET # BLD AUTO: 230 K/UL — SIGNIFICANT CHANGE UP (ref 150–400)
POTASSIUM SERPL-MCNC: 4.7 MMOL/L — SIGNIFICANT CHANGE UP (ref 3.5–5.3)
POTASSIUM SERPL-SCNC: 4.7 MMOL/L — SIGNIFICANT CHANGE UP (ref 3.5–5.3)
PROT SERPL-MCNC: 6.6 G/DL — SIGNIFICANT CHANGE UP (ref 6–8.3)
PROT UR-MCNC: NEGATIVE — SIGNIFICANT CHANGE UP
RBC # BLD: 5.08 M/UL — SIGNIFICANT CHANGE UP (ref 4.2–5.8)
RBC # FLD: 17.2 % — HIGH (ref 10.3–14.5)
RBC CASTS # UR COMP ASSIST: SIGNIFICANT CHANGE UP /HPF (ref 0–2)
SARS-COV-2 RNA SPEC QL NAA+PROBE: SIGNIFICANT CHANGE UP
SODIUM SERPL-SCNC: 140 MMOL/L — SIGNIFICANT CHANGE UP (ref 135–145)
SP GR SPEC: 1.01 — SIGNIFICANT CHANGE UP (ref 1.01–1.02)
UROBILINOGEN FLD QL: NEGATIVE — SIGNIFICANT CHANGE UP
WBC # BLD: 9.05 K/UL — SIGNIFICANT CHANGE UP (ref 3.8–10.5)
WBC # FLD AUTO: 9.05 K/UL — SIGNIFICANT CHANGE UP (ref 3.8–10.5)
WBC UR QL: SIGNIFICANT CHANGE UP /HPF (ref 0–5)

## 2020-10-07 PROCEDURE — 87086 URINE CULTURE/COLONY COUNT: CPT

## 2020-10-07 PROCEDURE — 99285 EMERGENCY DEPT VISIT HI MDM: CPT

## 2020-10-07 PROCEDURE — 80053 COMPREHEN METABOLIC PANEL: CPT

## 2020-10-07 PROCEDURE — 83735 ASSAY OF MAGNESIUM: CPT

## 2020-10-07 PROCEDURE — 87491 CHLMYD TRACH DNA AMP PROBE: CPT

## 2020-10-07 PROCEDURE — 85025 COMPLETE CBC W/AUTO DIFF WBC: CPT

## 2020-10-07 PROCEDURE — 87635 SARS-COV-2 COVID-19 AMP PRB: CPT

## 2020-10-07 PROCEDURE — 36415 COLL VENOUS BLD VENIPUNCTURE: CPT

## 2020-10-07 PROCEDURE — 81001 URINALYSIS AUTO W/SCOPE: CPT

## 2020-10-07 PROCEDURE — 86703 HIV-1/HIV-2 1 RESULT ANTBDY: CPT

## 2020-10-07 PROCEDURE — 87591 N.GONORRHOEAE DNA AMP PROB: CPT

## 2020-10-07 PROCEDURE — 93005 ELECTROCARDIOGRAM TRACING: CPT

## 2020-10-07 RX ORDER — ACETAMINOPHEN 500 MG
975 TABLET ORAL ONCE
Refills: 0 | Status: COMPLETED | OUTPATIENT
Start: 2020-10-07 | End: 2020-10-07

## 2020-10-07 NOTE — ED PROVIDER NOTE - PATIENT PORTAL LINK FT
You can access the FollowMyHealth Patient Portal offered by [facility] by registering at the following website: [email or web address]. By joining [employer]’s FollowMyHealth portal, you will also be able to view your health information using other applications (apps) compatible with our system.

## 2020-10-07 NOTE — ED PROVIDER NOTE - OBJECTIVE STATEMENT
30M that is domiciled at [facility], on 1:1 supervision at the [facility], with a diagnosis of impulse control disorder, factious disorder, mild intellectual disability, and asthma presenting with urinary retention. patient reports he has been unable to urinate since last night. was seen at [facility] for the same issue last night and had mckeon placed, but patient is unable to stay at the [facility] with the mckeon. reports lower abdominal pain. no fever, chest pain, shortness of breath, nausea or vomiting. patient requesting STI testing because he and his girlfriend have a lot of sex. no testicular pain or penile discharge.

## 2020-10-07 NOTE — ED PROVIDER NOTE - CLINICAL SUMMARY MEDICAL DECISION MAKING FREE TEXT BOX
30M presenting with urinary retention. has had similar episodes multiple times in the past including last night. reports abdominal pain. no nausea vomiting, back pain, midline spinal tenderness, fever or trauma. no known organic cause of urinary retention. will get labs, UA, mckeon and will reassess.

## 2020-10-07 NOTE — ED PROVIDER NOTE - NSFOLLOWUPINSTRUCTIONS_ED_ALL_ED_FT
You were seen in the emergency department for urinary retention.    Please follow-up with your primary care doctor in the next 24-48 hours.     Please follow-up with a urologist in the next 1-2 weeks.     If you have any worsening symptoms, severe chest pain, nausea, vomiting, or you are unable to urinate, please return to the emergency department.

## 2020-10-07 NOTE — ED PROVIDER NOTE - PROGRESS NOTE DETAILS
spoke with representative from [facility] who reports the patient has been seen in the hospital multiple times for the same symptoms but that he typically finds reasons to go to the hospital when he becomes overwhelmed. today he had an argument with his finance after being found hugging another resident.  Naveen Mccann updated patient on results. discussed need for urology follow-up but that patient would be able to follow-up as an outpatient if he was able to urinate. patient willing to try and had normal urination after mckeon was removed. patient requesting to be discharged. -Naveen Mccann

## 2020-10-07 NOTE — ED ADULT NURSE REASSESSMENT NOTE - NS ED NURSE REASSESS COMMENT FT1
Mckeon was placed on pt @1730, draining well.  Mckeon removed at 11:00pm.  Pt was able to urinate 150ml after removal of mckeon.  Pt in stable condition. waiting on ambulance.

## 2020-10-07 NOTE — ED PROVIDER NOTE - NSFOLLOWUPCLINICS_GEN_ALL_ED_FT
[facility]  Urology  [address] [place] [address]  Phone: [phone]  Fax: [phone]  Follow Up Time:

## 2020-10-07 NOTE — ED ADULT NURSE NOTE - OBJECTIVE STATEMENT
Pt BIBA due to urinary retention. PT went to [facility] yesterday due to not being able to urinate, PT states the has happened 6x.. He states a catheter was placed yesterday which helped him urinate and then removed and he has not been able to go again. PT states he was told he has and infection but does not know what, from having unprotected sex. Pt currently has a cast on right hand and  forearm. Medical HX ADHD, asthmatic, Hyperthyroidism, Obesity, GERD, Enuresis, Sun sensitivity, Impulse Control Disorder, Factitious Disorder, Myopia, Moderate Intellectual Disability, Mitral Valve Disease. Pt also has PICA and seeks out Sharp objects to harm himself in time of frustration.

## 2020-10-08 VITALS
HEART RATE: 89 BPM | OXYGEN SATURATION: 96 % | TEMPERATURE: 99 F | SYSTOLIC BLOOD PRESSURE: 123 MMHG | RESPIRATION RATE: 18 BRPM | DIASTOLIC BLOOD PRESSURE: 71 MMHG

## 2020-10-10 PROCEDURE — 99283 EMERGENCY DEPT VISIT LOW MDM: CPT

## 2020-10-11 ENCOUNTER — EMERGENCY (EMERGENCY)
Facility: HOSPITAL | Age: 30
LOS: 1 days | Discharge: HOME | End: 2020-10-11
Attending: EMERGENCY MEDICINE
Payer: MEDICAID

## 2020-10-11 VITALS
OXYGEN SATURATION: 97 % | WEIGHT: 222.67 LBS | DIASTOLIC BLOOD PRESSURE: 88 MMHG | TEMPERATURE: 98 F | SYSTOLIC BLOOD PRESSURE: 134 MMHG | HEART RATE: 104 BPM | RESPIRATION RATE: 15 BRPM | HEIGHT: 71 IN

## 2020-10-11 PROCEDURE — 99283 EMERGENCY DEPT VISIT LOW MDM: CPT

## 2020-10-11 NOTE — ED ADULT TRIAGE NOTE - CHIEF COMPLAINT QUOTE
Pt BIBA from [facility]. Pt states "I have bladder probblem." But denies fever and unable to recall last void. Pt BIBA from [facility]. Pt states "I have bladder problem." Pt denies fever and unable to recall last void.

## 2020-10-12 ENCOUNTER — EMERGENCY (EMERGENCY)
Facility: HOSPITAL | Age: 30
LOS: 1 days | Discharge: HOME | End: 2020-10-12
Attending: STUDENT IN AN ORGANIZED HEALTH CARE EDUCATION/TRAINING PROGRAM
Payer: MEDICAID

## 2020-10-12 VITALS
RESPIRATION RATE: 18 BRPM | SYSTOLIC BLOOD PRESSURE: 115 MMHG | DIASTOLIC BLOOD PRESSURE: 92 MMHG | HEIGHT: 71 IN | HEART RATE: 82 BPM | OXYGEN SATURATION: 99 % | TEMPERATURE: 98 F

## 2020-10-12 LAB
APPEARANCE UR: CLEAR — SIGNIFICANT CHANGE UP
BILIRUB UR-MCNC: NEGATIVE — SIGNIFICANT CHANGE UP
C TRACH RRNA SPEC QL NAA+PROBE: SIGNIFICANT CHANGE UP
COLOR SPEC: YELLOW — SIGNIFICANT CHANGE UP
DIFF PNL FLD: NEGATIVE — SIGNIFICANT CHANGE UP
GLUCOSE UR QL: NEGATIVE — SIGNIFICANT CHANGE UP
KETONES UR-MCNC: ABNORMAL
LEUKOCYTE ESTERASE UR-ACNC: NEGATIVE — SIGNIFICANT CHANGE UP
N GONORRHOEA RRNA SPEC QL NAA+PROBE: SIGNIFICANT CHANGE UP
NITRITE UR-MCNC: NEGATIVE — SIGNIFICANT CHANGE UP
PH UR: 6 — SIGNIFICANT CHANGE UP (ref 5–8)
PROT UR-MCNC: NEGATIVE — SIGNIFICANT CHANGE UP
SP GR SPEC: 1.01 — SIGNIFICANT CHANGE UP (ref 1.01–1.02)
SPECIMEN SOURCE: SIGNIFICANT CHANGE UP
UROBILINOGEN FLD QL: 1

## 2020-10-12 PROCEDURE — 87086 URINE CULTURE/COLONY COUNT: CPT

## 2020-10-12 PROCEDURE — 87491 CHLMYD TRACH DNA AMP PROBE: CPT

## 2020-10-12 PROCEDURE — 81003 URINALYSIS AUTO W/O SCOPE: CPT

## 2020-10-12 PROCEDURE — 99283 EMERGENCY DEPT VISIT LOW MDM: CPT

## 2020-10-12 PROCEDURE — 87591 N.GONORRHOEAE DNA AMP PROB: CPT

## 2020-10-12 NOTE — ED ADULT NURSE NOTE - OBJECTIVE STATEMENT
Pt BIBA from [facility]. Pt states "I have bladder problem." Pt denies fever and unable to recall last void.

## 2020-10-12 NOTE — ED PROVIDER NOTE - PATIENT PORTAL LINK FT
You can access the FollowMyHealth Patient Portal offered by [facility] by registering at the following website: [email or web address]. By joining [employer]’s FollowMyHealth portal, you will also be able to view your health information using other applications (apps) compatible with our system.

## 2020-10-12 NOTE — ED PROVIDER NOTE - CONSTITUTIONAL, MLM
normal... Well appearing, alert, oriented to person, place, time/situation and in no apparent distress.

## 2020-10-12 NOTE — ED PROVIDER NOTE - OBJECTIVE STATEMENT
29 y/o male h/o intellectual disability, impulse control disorder, GERD, asthma, hyperthyroidism, mitral valve disease, factitious disorder, mood disorder, myopia of both eyes, presents with difficulty urinating. patient reports he has not been able to urinate since he left the ED last night. reports abdominal pain. no  nausea, vomiting, pain or burning with urination, fever, chest pain or shortness of breath.

## 2020-10-12 NOTE — ED PROVIDER NOTE - NSFOLLOWUPINSTRUCTIONS_ED_ALL_ED_FT
You were seen in the emergency department for urinary retention.     Please follow-up with a urologist in the next 1-2 weeks.     If you have any worsening symptoms, severe abdominal pain, pain or burning with urination, or you are unable to urinate, please return to the emergency department.

## 2020-10-12 NOTE — ED ADULT TRIAGE NOTE - CHIEF COMPLAINT QUOTE
BIBA, unable to complete urination, came here last night same reasons, denied chest pain or sob, c/o lower abdominal pain, sending from [facility]

## 2020-10-12 NOTE — ED ADULT NURSE NOTE - OBJECTIVE STATEMENT
BIba c/o difficulty on urinating, patient came to Ed with similar complaint before,  Patient was able to urinate while in ED

## 2020-10-12 NOTE — ED PROVIDER NOTE - PATIENT PORTAL LINK FT
You can access the FollowMyHealth Patient Portal offered by [facility] by registering at the following website: [email or web address]. By joining [employer]’s FollowMyHealth portal, you will also be able to view your health information using other applications (apps) compatible with our system.

## 2020-10-12 NOTE — ED ADULT NURSE NOTE - CHIEF COMPLAINT QUOTE
BIBA, unable to complete urination, came here last night same reasons, denied chest pain or sob, c/o lower abdominal pain, sending from [facility]

## 2020-10-12 NOTE — ED PROVIDER NOTE - NSFOLLOWUPINSTRUCTIONS_ED_ALL_ED_FT
Followup with PMD for reevaluation.  Return to ED if patient unable to urinate for more than 6 hours.

## 2020-10-12 NOTE — ED PROVIDER NOTE - CLINICAL SUMMARY MEDICAL DECISION MAKING FREE TEXT BOX
29 y/o male with history of intellectual disability, impulse control disorder, and GERD, presents after reporting difficulty urinating at group home. In ED patient is able to provide urine sample. UA negative for infection. Patient is stable for discharge.

## 2020-10-12 NOTE — ED PROVIDER NOTE - PHYSICAL EXAMINATION
General: well appearing male, no acute distress   HEENT: normocephalic, atraumatic   Respiratory: normal work of breathing  Abdomen: soft, non-tender, no guarding or rebound, no CVA tenderness   MSK: no swelling or tenderness of lower extremities, moving all extremities spontaneously   Skin: warm, dry   Neuro: A&Ox3  Psych: appropriate affect

## 2020-10-12 NOTE — ED PROVIDER NOTE - NEUROLOGICAL, MLM
patient is sleeping but easily arousable, becomes awake and alert, and answers questions appropriately

## 2020-10-12 NOTE — ED PROVIDER NOTE - OBJECTIVE STATEMENT
31 y/o male h/o intellectual disability, impulse control disorder, GERD, asthma, hyperthyroidism, mitral valve disease, factitious disorder, mood disorder, myopia of both eyes, presents with difficulty urinating. However, staff from [facility] states patient has been urinating all fay and has had bowel movements during the day as well. Currently, patient denies any abdominal pain and difficulty urinating. Patient was able to give a urine sample in the ED.

## 2020-10-12 NOTE — ED PROVIDER NOTE - CLINICAL SUMMARY MEDICAL DECISION MAKING FREE TEXT BOX
30M presenting with urinary retention. patient has been seen in this ED many times for similar presentation. patient able to urinate in the ED today without intervention. post-void 22. labs and urine from the last few visits normal without signs of infection. unlikely physical cause of urinary retention, possibly psychogenic/behavioral. will discharge with urology follow-up.

## 2020-10-12 NOTE — ED ADULT NURSE NOTE - CHIEF COMPLAINT QUOTE
Pt BIBA from [facility]. Pt states "I have bladder problem." Pt denies fever and unable to recall last void.

## 2020-10-13 LAB
CULTURE RESULTS: SIGNIFICANT CHANGE UP
SPECIMEN SOURCE: SIGNIFICANT CHANGE UP

## 2020-10-15 ENCOUNTER — EMERGENCY (EMERGENCY)
Facility: HOSPITAL | Age: 30
LOS: 1 days | Discharge: HOME | End: 2020-10-15
Attending: EMERGENCY MEDICINE
Payer: MEDICAID

## 2020-10-15 VITALS
HEIGHT: 71 IN | RESPIRATION RATE: 18 BRPM | TEMPERATURE: 98 F | SYSTOLIC BLOOD PRESSURE: 122 MMHG | HEART RATE: 97 BPM | OXYGEN SATURATION: 97 % | DIASTOLIC BLOOD PRESSURE: 71 MMHG

## 2020-10-15 VITALS
DIASTOLIC BLOOD PRESSURE: 74 MMHG | OXYGEN SATURATION: 97 % | HEART RATE: 68 BPM | SYSTOLIC BLOOD PRESSURE: 147 MMHG | TEMPERATURE: 98 F | RESPIRATION RATE: 16 BRPM

## 2020-10-15 LAB
APPEARANCE UR: CLEAR — SIGNIFICANT CHANGE UP
BILIRUB UR-MCNC: NEGATIVE — SIGNIFICANT CHANGE UP
COLOR SPEC: YELLOW — SIGNIFICANT CHANGE UP
DIFF PNL FLD: NEGATIVE — SIGNIFICANT CHANGE UP
GLUCOSE UR QL: NEGATIVE — SIGNIFICANT CHANGE UP
KETONES UR-MCNC: NEGATIVE — SIGNIFICANT CHANGE UP
LEUKOCYTE ESTERASE UR-ACNC: NEGATIVE — SIGNIFICANT CHANGE UP
NITRITE UR-MCNC: NEGATIVE — SIGNIFICANT CHANGE UP
PH UR: 7 — SIGNIFICANT CHANGE UP (ref 5–8)
PROT UR-MCNC: NEGATIVE — SIGNIFICANT CHANGE UP
SP GR SPEC: 1.01 — SIGNIFICANT CHANGE UP (ref 1.01–1.02)
UROBILINOGEN FLD QL: NEGATIVE — SIGNIFICANT CHANGE UP

## 2020-10-15 PROCEDURE — 87086 URINE CULTURE/COLONY COUNT: CPT

## 2020-10-15 PROCEDURE — 99283 EMERGENCY DEPT VISIT LOW MDM: CPT

## 2020-10-15 PROCEDURE — 81003 URINALYSIS AUTO W/O SCOPE: CPT

## 2020-10-15 NOTE — ED ADULT NURSE NOTE - NS ED NURSE LEVEL OF CONSCIOUSNESS SPEECH
[General Appearance - Well Developed] : well developed Speaking Coherently [Normal Appearance] : normal appearance [General Appearance - Well Nourished] : well nourished [Well Groomed] : well groomed [General Appearance - In No Acute Distress] : no acute distress [Normal Conjunctiva] : the conjunctiva exhibited no abnormalities [No Oral Cyanosis] : no oral cyanosis [Normal Jugular Venous V Waves Present] : normal jugular venous V waves present [Respiration, Rhythm And Depth] : normal respiratory rhythm and effort [] : no respiratory distress [Auscultation Breath Sounds / Voice Sounds] : lungs were clear to auscultation bilaterally [Exaggerated Use Of Accessory Muscles For Inspiration] : no accessory muscle use [Heart Rate And Rhythm] : heart rate and rhythm were normal [Heart Sounds] : normal S1 and S2 [Murmurs] : no murmurs present [Arterial Pulses Normal] : the arterial pulses were normal [Abdomen Soft] : soft [Abdomen Tenderness] : non-tender [Nail Clubbing] : no clubbing of the fingernails [Cyanosis, Localized] : no localized cyanosis [Impaired Insight] : insight and judgment were intact [Oriented To Time, Place, And Person] : oriented to person, place, and time [Affect] : the affect was normal [Memory Recent] : recent memory was not impaired [FreeTextEntry1] : mild breakdown on left shin

## 2020-10-15 NOTE — ED PROVIDER NOTE - PATIENT PORTAL LINK FT
You can access the FollowMyHealth Patient Portal offered by [facility] by registering at the following website: [email or web address]. By joining [employer]’s FollowMyHealth portal, you will also be able to view your health information using other applications (apps) compatible with our system.

## 2020-10-15 NOTE — ED ADULT NURSE NOTE - OBJECTIVE STATEMENT
The patient is a resident from [facility] complaining of frequent urinary retention and dysuria.  As per the  Crisis worker, the team would like constipation and urinary retention ruled out.  On examination, the patient denies pain.  He was able to void 700ml of urine without pain and discomfort.

## 2020-10-15 NOTE — ED PROVIDER NOTE - NSFOLLOWUPINSTRUCTIONS_ED_ALL_ED_FT
Acute Urinary Retention, Male       Acute urinary retention is a condition in which a person is unable to pass urine. This can last for a short time or for a long time. If left untreated, it can result in kidney damage or other serious complications.      What are the causes?  This condition may be caused by:  •Obstruction or narrowing of the tube that drains the bladder (urethra). This may be caused by surgery or problems with nearby organs, such as the prostate gland, which can press or squeeze the urethra.      •Problems with the nerves in the bladder. These can be caused by diseases, such as multiple sclerosis, or by spinal cord injuries.      •Certain medicines.      •Tumors in the area of the pelvis, bladder, or urethra.      •Diabetes.      •Degenerative cognitive conditions such as delirium or dementia.      •Bladder or urinary tract infection.      •Constipation.      •Blood in the urine (hematuria).      •Injury to the bladder or urethra.       •Psychological (psychogenic) conditions. Someone may hold his urine due to trauma or because he does not want to use the bathroom.        What increases the risk?    This condition is more likely to develop in older men. As men age, their prostate may become larger and may start pressing or squeezing on the bladder or the urethra.      What are the signs or symptoms?  Symptoms of this condition include:  •Trouble urinating.      •Pain in the lower abdomen.      Symptoms usually come on slowly over a long period of time.      How is this diagnosed?  This condition is diagnosed based on a physical exam and a medical history. You may also have other tests, including:  •An ultrasound of the bladder or kidneys or both.      •Blood tests.      •A urine analysis.      •Additional tests may be needed such as an MRI, kidney, or bladder function tests.        How is this treated?  Treatment for this condition may include:  •Medicines.      •Placing a thin, sterile tube (catheter) into the bladder to drain urine out of the body. This is called an indwelling urinary catheter. After being inserted, the catheter is held in place with a small balloon that is filled with sterile water. Urine drains from the catheter into a collection bag outside of the body.      •Behavioral therapy.      •Treatment for any underlying conditions.      •If needed, you may be treated in the hospital for kidney function problems or to manage other complications.        Follow these instructions at home:    •Take over-the-counter and prescription medicines only as told by your health care provider. Avoid certain medicines, such as decongestants, antihistamines, and some prescription medicines. Do not take any medicine unless your health care provider has approved.      •If you were given an indwelling urinary catheter, take care of it as told by your health care provider.      •Drink enough fluid to keep your urine clear or pale yellow.      •If you were prescribed an antibiotic, take it as told by your health care provider. Do not stop taking the antibiotic even if you start to feel better.      • Do not use any products that contain nicotine or tobacco, such as cigarettes and e-cigarettes. If you need help quitting, ask your health care provider.      •Monitor any changes in your symptoms. Tell your health care provider about any changes.      •If instructed, monitor your blood pressure at home. Report changes as told by your health care provider.      •Keep all follow-up visits as told by your health care provider. This is important.        Contact a health care provider if:    •You have uncomfortable bladder contractions that you cannot control (spasms) or you leak urine with the spasms.        Get help right away if:    •You have chills or fever.      •You have blood in your urine.    •You have a catheter and:  •Your catheter stops draining urine.      •Your catheter falls out.          Summary    •Acute urinary retention is a condition in which a person is unable to pass urine. If left untreated, it can result in kidney damage or other serious complications.      •The cause of this condition may include an enlarged prostate. As men age, their prostate gland may become larger and may start pressing or squeezing on the bladder or the urethra.      •Treatment for this condition may include medicines and placement of an indwelling urinary catheter.      •Monitor any changes in your symptoms. Tell your health care provider about any changes.      This information is not intended to replace advice given to you by your health care provider. Make sure you discuss any questions you have with your health care provider.      Document Released: 03/26/2002 Document Revised: 11/30/2018 Document Reviewed: 01/19/2018    Elsevier Patient Education © 2020 [employer]

## 2020-10-15 NOTE — ED PROVIDER NOTE - CLINICAL SUMMARY MEDICAL DECISION MAKING FREE TEXT BOX
Patient presents to the ED with complaints of urinary retention, however while in the ED patient was able to urinate spontaneously approximately 700 mL. Currently, patient feels well. UA shows no UTI. Will discharge with staff member from [facility] staff member who will help patient return home safely. Patient to followup with urology as needed.

## 2020-10-15 NOTE — ED PROVIDER NOTE - OBJECTIVE STATEMENT
30 year old male with PMHx of factitious disorder,  asthma, enuresis, GERD, hyperthyroidism, impulse control disorder, intellectual disability, mitral valve disease, mood disorder, myopia of bilateral eyes, and urinary retention with frequent visits for urinary retention presents to the ED with complaints of urinary retention today and several hours of discomfort with urination. Patient otherwise denies any fever, vomiting, hematuria, abdominal pain, flank pain, and all other acute complaints. Patient is noted to have recently been evaluated by urologist Dr. Cartagena on 10/13, and was found to have a PVR of 86 mL at the time. Patient's records show that patient was advised to take Flomax and Finasteride, which patient endorses that he has been compliant with. NKDA.   Allergies: Zyprexa (dystonic reaction) 30 year old male with PMHx of factitious disorder,  asthma, enuresis, GERD, hyperthyroidism, impulse control disorder, intellectual disability, mitral valve disease, mood disorder, myopia of bilateral eyes, and urinary retention with frequent visits for urinary retention presents to the ED with complaints of urinary retention today and several hours of discomfort with urination. Patient otherwise denies any fever, vomiting, hematuria, abdominal pain, flank pain, and all other acute complaints. Patient is noted to have recently been evaluated by urologist Dr. Cartagena on 10/13, and was found to have a PVR of 86 mL at the time. Patient's records show that patient was advised to take Flomax and Finasteride, which patient endorses that he has been compliant with.   Allergies: Zyprexa (dystonic reaction)

## 2020-10-17 ENCOUNTER — EMERGENCY (EMERGENCY)
Facility: HOSPITAL | Age: 30
LOS: 1 days | Discharge: HOME | End: 2020-10-17
Attending: EMERGENCY MEDICINE
Payer: MEDICAID

## 2020-10-17 VITALS
HEART RATE: 80 BPM | HEIGHT: 71 IN | RESPIRATION RATE: 16 BRPM | WEIGHT: 224.87 LBS | DIASTOLIC BLOOD PRESSURE: 79 MMHG | OXYGEN SATURATION: 100 % | SYSTOLIC BLOOD PRESSURE: 121 MMHG | TEMPERATURE: 98 F

## 2020-10-17 LAB
CULTURE RESULTS: NO GROWTH — SIGNIFICANT CHANGE UP
SPECIMEN SOURCE: SIGNIFICANT CHANGE UP

## 2020-10-17 PROCEDURE — 99282 EMERGENCY DEPT VISIT SF MDM: CPT

## 2020-10-17 PROCEDURE — 99283 EMERGENCY DEPT VISIT LOW MDM: CPT

## 2020-10-17 NOTE — ED ADULT TRIAGE NOTE - CHIEF COMPLAINT QUOTE
I have pain to right thumb under cast.  I had dislocation of right thumb a month ago and hard cast was placed.  No swelling, sensory or motor deficitit noted I have pain to right thumb under cast.  I had dislocation of right thumb a month ago and hard cast was placed.  No swelling, sensory or motor deficit noted

## 2020-10-17 NOTE — ED PROVIDER NOTE - PATIENT PORTAL LINK FT
You can access the FollowMyHealth Patient Portal offered by [facility] by registering at the following website: [email or web address]. By joining [employer]’s FollowMyHealth portal, you will also be able to view your health information using other applications (apps) compatible with our system.

## 2020-10-17 NOTE — ED ADULT NURSE NOTE - OBJECTIVE STATEMENT
I have pain to right thumb under cast.  I had dislocation of right thumb a month ago and hard cast was placed.  No swelling, sensory or motor deficit noted

## 2020-10-17 NOTE — ED PROVIDER NOTE - NSFOLLOWUPINSTRUCTIONS_ED_ALL_ED_FT
Keep cast on until you see the Orthopedist next week.  Keep it dry and clean.  Return tot he ER for any concerns.

## 2020-10-17 NOTE — ED PROVIDER NOTE - PHYSICAL EXAMINATION
thumb spika cast on R arm fingers normal no clara tenderness, no deformity , neurovascularly intact full range of motion as permitted by cast  cast soaked with water , in decent shape, cap refill normal in all finger tips thumb spica cast on R arm fingers normal no bony tenderness, no deformity , neurovascularly intact full range of motion as permitted by cast  cast soaked with water , in decent condition  cap refill normal in all finger tips

## 2020-10-17 NOTE — ED PROVIDER NOTE - OBJECTIVE STATEMENT
30 y.o male with a PMHx of MR, ADHD, hyperthyroidism , GERD and no PSHx presents to the ED from group home c.o getting cast on thumb wet in shower and having thumb pain. Patient endorses x1 month ago he injured his thumb, he went to [facility] and had a cast placed. Patient states today he took a shower and soaked the cast with water. As per attendant in group home , patient hit it against the door. Patient was c/o thumb pain. Patient denies any pain and is not in any distress on evaluation. Patient denies any other acute complaints. Allergies: Zyprexa

## 2020-11-24 NOTE — ED ADULT NURSE NOTE - NS ED NOTE ABUSE SUSPICION NEGLECT YN
With Augmentin tablets, these can be crushed and taken with food. Does she think she could do this? Otherwise we could just try amoxcillin 500 mg (as a liquid this would be 6.3 mL) three times daily.)   No

## 2020-11-27 NOTE — ED ADULT NURSE NOTE - NSFALLRSKASSESSTYPE_ED_ALL_ED
Gestational Diabetes Follow-up    Subjective/Objective:    Winsome Edwards sent in blood glucose log for review. Last date of communication was: 11/24/202.    Gestational diabetes is being managed with diet, activity     Taking diabetes medications: no        Estimated Date of Delivery: 12/21, measuring for early deliver    BG/Food Log:       Assessment:    Ketones: neg.   Fasting blood glucoses: 60% in target.  After breakfast: 100% in target.  Before lunch: % in target.  After lunch: 75% in target.  Before dinner: % in target.  After dinner: 100% in target.    Plan/Response:  No changes in the patient's current treatment plan.    Winsome, great job, even though you had some higher numbers, you are still in target.  Getting closer the magic moment, you will do fine.  For having Thanksgiving you did very well.  Just send in your numbers again in a week , keep up the great work and stay safe.  and yes the bedtime snack can really effect the fasting BG, it is very important , so just keep on trying, getting in 2 carb choices and proteins will really help    Nahomi Lowe RN/E  [place] [facility]      Any diabetes medication dose changes were made via the CDE Protocol and Collaborative Practice Agreement with the patient's primary care provider and OB/GYN provider. A copy of this encounter was shared with the provider.       Initial (On Arrival)

## 2021-02-25 ENCOUNTER — EMERGENCY (EMERGENCY)
Facility: HOSPITAL | Age: 31
LOS: 1 days | Discharge: HOME | End: 2021-02-25
Attending: EMERGENCY MEDICINE
Payer: MEDICAID

## 2021-02-25 ENCOUNTER — EMERGENCY (EMERGENCY)
Facility: HOSPITAL | Age: 31
LOS: 1 days | Discharge: HOME | End: 2021-02-25
Attending: EMERGENCY MEDICINE | Admitting: EMERGENCY MEDICINE
Payer: MEDICAID

## 2021-02-25 VITALS
DIASTOLIC BLOOD PRESSURE: 80 MMHG | HEART RATE: 96 BPM | TEMPERATURE: 99 F | WEIGHT: 250 LBS | RESPIRATION RATE: 16 BRPM | HEIGHT: 71 IN | OXYGEN SATURATION: 96 % | SYSTOLIC BLOOD PRESSURE: 117 MMHG

## 2021-02-25 VITALS
SYSTOLIC BLOOD PRESSURE: 128 MMHG | OXYGEN SATURATION: 96 % | DIASTOLIC BLOOD PRESSURE: 84 MMHG | HEART RATE: 90 BPM | TEMPERATURE: 98 F | HEIGHT: 71 IN | RESPIRATION RATE: 18 BRPM

## 2021-02-25 LAB
ALBUMIN SERPL ELPH-MCNC: 2.9 G/DL — LOW (ref 3.5–5)
ALP SERPL-CCNC: 85 U/L — SIGNIFICANT CHANGE UP (ref 40–120)
ALT FLD-CCNC: 17 U/L DA — SIGNIFICANT CHANGE UP (ref 10–60)
ANION GAP SERPL CALC-SCNC: 7 MMOL/L — SIGNIFICANT CHANGE UP (ref 5–17)
AST SERPL-CCNC: 6 U/L — LOW (ref 10–40)
BASOPHILS # BLD AUTO: 0 K/UL — SIGNIFICANT CHANGE UP (ref 0–0.2)
BASOPHILS NFR BLD AUTO: 0 % — SIGNIFICANT CHANGE UP (ref 0–2)
BILIRUB SERPL-MCNC: 0.2 MG/DL — SIGNIFICANT CHANGE UP (ref 0.2–1.2)
BUN SERPL-MCNC: 20 MG/DL — HIGH (ref 7–18)
CALCIUM SERPL-MCNC: 8.1 MG/DL — LOW (ref 8.4–10.5)
CHLORIDE SERPL-SCNC: 103 MMOL/L — SIGNIFICANT CHANGE UP (ref 96–108)
CO2 SERPL-SCNC: 29 MMOL/L — SIGNIFICANT CHANGE UP (ref 22–31)
CREAT SERPL-MCNC: 1.03 MG/DL — SIGNIFICANT CHANGE UP (ref 0.5–1.3)
EOSINOPHIL # BLD AUTO: 0.08 K/UL — SIGNIFICANT CHANGE UP (ref 0–0.5)
EOSINOPHIL NFR BLD AUTO: 1 % — SIGNIFICANT CHANGE UP (ref 0–6)
GLUCOSE SERPL-MCNC: 107 MG/DL — HIGH (ref 70–99)
HCT VFR BLD CALC: 39.7 % — SIGNIFICANT CHANGE UP (ref 39–50)
HGB BLD-MCNC: 12.4 G/DL — LOW (ref 13–17)
LYMPHOCYTES # BLD AUTO: 3.08 K/UL — SIGNIFICANT CHANGE UP (ref 1–3.3)
LYMPHOCYTES # BLD AUTO: 40 % — SIGNIFICANT CHANGE UP (ref 13–44)
MCHC RBC-ENTMCNC: 24.8 PG — LOW (ref 27–34)
MCHC RBC-ENTMCNC: 31.2 GM/DL — LOW (ref 32–36)
MCV RBC AUTO: 79.6 FL — LOW (ref 80–100)
MONOCYTES # BLD AUTO: 0.54 K/UL — SIGNIFICANT CHANGE UP (ref 0–0.9)
MONOCYTES NFR BLD AUTO: 7 % — SIGNIFICANT CHANGE UP (ref 2–14)
NEUTROPHILS # BLD AUTO: 3.54 K/UL — SIGNIFICANT CHANGE UP (ref 1.8–7.4)
NEUTROPHILS NFR BLD AUTO: 46 % — SIGNIFICANT CHANGE UP (ref 43–77)
NT-PROBNP SERPL-SCNC: 59 PG/ML — SIGNIFICANT CHANGE UP (ref 0–125)
PLATELET # BLD AUTO: 246 K/UL — SIGNIFICANT CHANGE UP (ref 150–400)
POTASSIUM SERPL-MCNC: 3.8 MMOL/L — SIGNIFICANT CHANGE UP (ref 3.5–5.3)
POTASSIUM SERPL-SCNC: 3.8 MMOL/L — SIGNIFICANT CHANGE UP (ref 3.5–5.3)
PROT SERPL-MCNC: 5.8 G/DL — LOW (ref 6–8.3)
RBC # BLD: 4.99 M/UL — SIGNIFICANT CHANGE UP (ref 4.2–5.8)
RBC # FLD: 15.3 % — HIGH (ref 10.3–14.5)
SODIUM SERPL-SCNC: 139 MMOL/L — SIGNIFICANT CHANGE UP (ref 135–145)
TROPONIN I SERPL-MCNC: <0.015 NG/ML — SIGNIFICANT CHANGE UP (ref 0–0.04)
WBC # BLD: 7.7 K/UL — SIGNIFICANT CHANGE UP (ref 3.8–10.5)
WBC # FLD AUTO: 7.7 K/UL — SIGNIFICANT CHANGE UP (ref 3.8–10.5)

## 2021-02-25 PROCEDURE — 71045 X-RAY EXAM CHEST 1 VIEW: CPT | Mod: 26

## 2021-02-25 PROCEDURE — 85025 COMPLETE CBC W/AUTO DIFF WBC: CPT

## 2021-02-25 PROCEDURE — 94640 AIRWAY INHALATION TREATMENT: CPT

## 2021-02-25 PROCEDURE — 36415 COLL VENOUS BLD VENIPUNCTURE: CPT

## 2021-02-25 PROCEDURE — 99285 EMERGENCY DEPT VISIT HI MDM: CPT | Mod: 25

## 2021-02-25 PROCEDURE — 71045 X-RAY EXAM CHEST 1 VIEW: CPT

## 2021-02-25 PROCEDURE — 83880 ASSAY OF NATRIURETIC PEPTIDE: CPT

## 2021-02-25 PROCEDURE — 96375 TX/PRO/DX INJ NEW DRUG ADDON: CPT

## 2021-02-25 PROCEDURE — 99285 EMERGENCY DEPT VISIT HI MDM: CPT

## 2021-02-25 PROCEDURE — 80053 COMPREHEN METABOLIC PANEL: CPT

## 2021-02-25 PROCEDURE — 93005 ELECTROCARDIOGRAM TRACING: CPT

## 2021-02-25 PROCEDURE — 84484 ASSAY OF TROPONIN QUANT: CPT

## 2021-02-25 PROCEDURE — 93010 ELECTROCARDIOGRAM REPORT: CPT

## 2021-02-25 PROCEDURE — 96374 THER/PROPH/DIAG INJ IV PUSH: CPT

## 2021-02-25 RX ORDER — IPRATROPIUM/ALBUTEROL SULFATE 18-103MCG
3 AEROSOL WITH ADAPTER (GRAM) INHALATION ONCE
Refills: 0 | Status: COMPLETED | OUTPATIENT
Start: 2021-02-25 | End: 2021-02-25

## 2021-02-25 RX ORDER — KETOROLAC TROMETHAMINE 30 MG/ML
30 SYRINGE (ML) INJECTION ONCE
Refills: 0 | Status: DISCONTINUED | OUTPATIENT
Start: 2021-02-25 | End: 2021-02-25

## 2021-02-25 RX ORDER — DEXAMETHASONE 0.5 MG/5ML
6 ELIXIR ORAL ONCE
Refills: 0 | Status: COMPLETED | OUTPATIENT
Start: 2021-02-25 | End: 2021-02-25

## 2021-02-25 RX ADMIN — Medication 3 MILLILITER(S): at 02:04

## 2021-02-25 RX ADMIN — Medication 6 MILLIGRAM(S): at 02:04

## 2021-02-25 RX ADMIN — Medication 30 MILLIGRAM(S): at 03:13

## 2021-02-25 NOTE — ED PROVIDER NOTE - CLINICAL SUMMARY MEDICAL DECISION MAKING FREE TEXT BOX
30 year old male with 2 weeks of chest tightness. vitals WNL. PE as above.  labs, ecg, cxr, pain control, reassess

## 2021-02-25 NOTE — ED PROVIDER NOTE - NSFOLLOWUPCLINICS_GEN_ALL_ED_FT
Myrtle Flores  Cardiology  [address], [address]  [place] [address]  Phone: [phone]  Fax:   Follow Up Time:

## 2021-02-25 NOTE — ED PROVIDER NOTE - PROGRESS NOTE DETAILS
Feels improved. ecg NSR, RRR, no acute ischemic changes. labs are unremarkable. cxr no infiltrate. no wheezing on exam- given neb as pt states feels like his asthma. will discharge. advised to f/u with cardiology. return precautions discussed.

## 2021-02-25 NOTE — ED PROVIDER NOTE - OBJECTIVE STATEMENT
30 year old male PMH MR, ADHD, asthma, hyperthyroid, GERD coming in with 2 weeks of substernal chest tightness that is nonradaiting. states he was seen at another hospital and given pain medication and it felt better. states feels slightly like asthma and use a neb at home with minimal improvement. hasn't taken any pain medication at home. denies SOB, palpitations, abd pains, N/v/D/C, urinary complaints, fevers, chills, sweats, back pains, ha, dizziness.

## 2021-02-25 NOTE — ED ADULT TRIAGE NOTE - CHIEF COMPLAINT QUOTE
Pt. brought in by EMS for suicide ideation and states he was punching wall to hurt himself. Denies homicide ideation, auditory/visual hallucinations, use of alcohol/drugs. Also endorses chest pain that began yesterday, states was seen at [facility] and discharged this morning. PMHx: mood disorder, impulse control disorder, intellectual disability, hyperthyroidism Pt. brought in by EMS for suicide ideation and states he was punching wall to hurt himself. Denies homicide ideation, auditory/visual hallucinations, use of alcohol/drugs. Also endorses chest pain that began yesterday, states was seen at [facility] and discharged this morning. PMHx: mood disorder, impulse control disorder, intellectual disability, hyperthyroidism. Spoke with  NP who states pt. to be seen in main ED. Pt. brought in by EMS for suicide ideation and states he was punching wall to hurt himself. Denies homicide ideation, auditory/visual hallucinations, use of alcohol/drugs. Also endorses chest pain that began yesterday, states was seen at [facility] and discharged this morning. PMHx: mood disorder, impulse control disorder, intellectual disability, hyperthyroidism. Spoke with  NP who states pt. to be seen in main ED.   2 bags of pt. belongings placed across from room 21 by PCA, valuables sent to security.

## 2021-02-25 NOTE — ED PROVIDER NOTE - CARDIAC, MLM
Normal rate, regular rhythm.  Heart sounds S1, S2.  No murmurs, rubs or gallops. chest wall nontender.

## 2021-02-25 NOTE — ED PROVIDER NOTE - NSFOLLOWUPINSTRUCTIONS_ED_ALL_ED_FT
Log Out.      [employer] CareNotes®     :  [facility]  	                       CHEST PAIN - AfterCare(R) Instructions(ER/ED)           Chest Pain    WHAT YOU NEED TO KNOW:    Chest pain can be caused by a range of conditions, from not serious to life-threatening. Chest pain can be a symptom of a digestive problem, such as acid reflux or a stomach ulcer. An anxiety attack or a strong emotion, such as anger, can also cause chest pain. Infection, inflammation, or a fracture in the bones or cartilage in your chest can cause pain or discomfort. Sometimes chest pain or pressure is caused by poor blood flow to your heart (angina). Chest pain may also be caused by life-threatening conditions such as a heart attack or blood clot in your lungs.    DISCHARGE INSTRUCTIONS:    Call your local emergency number (911 in the [place]) or have someone call if:   •You have any of the following signs of a heart attack: ?Squeezing, pressure, or pain in your chest      ?You may also have any of the following: ?Discomfort or pain in your back, neck, jaw, stomach, or arm      ?Shortness of breath      ?Nausea or vomiting      ?Lightheadedness or a sudden cold sweat            Return to the emergency department if:   •You have chest discomfort that gets worse, even with medicine.      •You cough or vomit blood.      •Your bowel movements are black or bloody.      •You cannot stop vomiting, or it hurts to swallow.      Call your doctor if:   •You have questions or concerns about your condition or care.          Medicines:   •Medicines may be given to treat the cause of your chest pain. Examples include pain medicine, anxiety medicine, or medicines to increase blood flow to your heart.      •Do not take certain medicines without asking your healthcare provider first. These include NSAIDs, herbal or vitamin supplements, or hormones (estrogen or progestin).      •Take your medicine as directed. Contact your healthcare provider if you think your medicine is not helping or if you have side effects. Tell him or her if you are allergic to any medicine. Keep a list of the medicines, vitamins, and herbs you take. Include the amounts, and when and why you take them. Bring the list or the pill bottles to follow-up visits. Carry your medicine list with you in case of an emergency.      Healthy living tips: The following are general healthy guidelines. If the cause of your chest pain is known, your healthcare provider will give you specific guidelines to follow.  •Do not smoke. Nicotine and other chemicals in cigarettes and cigars can cause lung and heart damage. Ask your healthcare provider for information if you currently smoke and need help to quit. E-cigarettes or smokeless tobacco still contain nicotine. Talk to your healthcare provider before you use these products.      •Choose a variety of healthy foods as often as possible. Include fresh, frozen, or canned fruits and vegetables. Also include low-fat dairy products, fish, chicken (without skin), and lean meats. Your healthcare provider or a dietitian can help you create meal plans. You may need to avoid certain foods or drinks if your pain is caused by a digestion problem.  Healthy Foods           •Lower your sodium (salt) intake. Limit foods that are high in sodium, such as canned foods, salty snacks, and cold cuts. If you add salt when you cook food, do not add more at the table. Choose low-sodium canned foods as much as possible.             •Drink plenty of water every day. Water helps your body to control your temperature and blood pressure. Ask your healthcare provider how much water you should drink every day.      •Ask about activity. Your healthcare provider will tell you which activities to limit or avoid. Ask when you can drive, return to work, and have sex. Ask about the best exercise plan for you.      •Maintain a healthy weight. Ask your healthcare provider what a healthy weight is for you. Ask him or her to help you create a safe weight loss plan if you are overweight.      •Ask about vaccines you may need. Get the influenza (flu) vaccine every year as soon as recommended, usually in September or October. You may also need a pneumococcal vaccine to prevent pneumonia. The vaccine is usually given every 5 years, starting at age 65. Your healthcare provider can tell you if should get other vaccines, and when to get them.      Follow up with your healthcare provider within 72 hours, or as directed: You may need to return for more tests to find the cause of your chest pain. You may be referred to a specialist, such as a cardiologist or gastroenterologist. Write down your questions so you remember to ask them during your visits.       © Copyright [employer] 2021           back to top                          © Copyright [employer] 2021

## 2021-02-25 NOTE — ED PROVIDER NOTE - PATIENT PORTAL LINK FT
You can access the FollowMyHealth Patient Portal offered by [facility] by registering at the following website: [email or web address]. By joining [employer]’s FollowMyHealth portal, you will also be able to view your health information using other applications (apps) compatible with our system.

## 2021-02-26 ENCOUNTER — EMERGENCY (EMERGENCY)
Facility: HOSPITAL | Age: 31
LOS: 1 days | Discharge: HOME | End: 2021-02-26
Attending: EMERGENCY MEDICINE
Payer: MEDICAID

## 2021-02-26 VITALS
RESPIRATION RATE: 18 BRPM | HEART RATE: 77 BPM | TEMPERATURE: 98 F | OXYGEN SATURATION: 100 % | DIASTOLIC BLOOD PRESSURE: 78 MMHG | SYSTOLIC BLOOD PRESSURE: 137 MMHG

## 2021-02-26 VITALS
DIASTOLIC BLOOD PRESSURE: 81 MMHG | SYSTOLIC BLOOD PRESSURE: 110 MMHG | HEART RATE: 78 BPM | TEMPERATURE: 98 F | RESPIRATION RATE: 16 BRPM | HEIGHT: 71 IN | OXYGEN SATURATION: 99 % | WEIGHT: 244.71 LBS

## 2021-02-26 VITALS
RESPIRATION RATE: 16 BRPM | TEMPERATURE: 98 F | HEART RATE: 95 BPM | SYSTOLIC BLOOD PRESSURE: 152 MMHG | DIASTOLIC BLOOD PRESSURE: 87 MMHG | OXYGEN SATURATION: 100 %

## 2021-02-26 DIAGNOSIS — F43.24 ADJUSTMENT DISORDER WITH DISTURBANCE OF CONDUCT: ICD-10-CM

## 2021-02-26 DIAGNOSIS — F79 UNSPECIFIED INTELLECTUAL DISABILITIES: ICD-10-CM

## 2021-02-26 LAB
ALBUMIN SERPL ELPH-MCNC: 3.9 G/DL — SIGNIFICANT CHANGE UP (ref 3.3–5)
ALP SERPL-CCNC: 89 U/L — SIGNIFICANT CHANGE UP (ref 40–120)
ALT FLD-CCNC: 8 U/L — SIGNIFICANT CHANGE UP (ref 4–41)
ANION GAP SERPL CALC-SCNC: 10 MMOL/L — SIGNIFICANT CHANGE UP (ref 7–14)
APPEARANCE UR: CLEAR — SIGNIFICANT CHANGE UP
APPEARANCE UR: CLEAR — SIGNIFICANT CHANGE UP
AST SERPL-CCNC: 11 U/L — SIGNIFICANT CHANGE UP (ref 4–40)
BASOPHILS # BLD AUTO: 0 K/UL — SIGNIFICANT CHANGE UP (ref 0–0.2)
BASOPHILS NFR BLD AUTO: 0 % — SIGNIFICANT CHANGE UP (ref 0–2)
BILIRUB SERPL-MCNC: <0.2 MG/DL — SIGNIFICANT CHANGE UP (ref 0.2–1.2)
BILIRUB UR-MCNC: NEGATIVE — SIGNIFICANT CHANGE UP
BILIRUB UR-MCNC: NEGATIVE — SIGNIFICANT CHANGE UP
BUN SERPL-MCNC: 20 MG/DL — SIGNIFICANT CHANGE UP (ref 7–23)
CALCIUM SERPL-MCNC: 9.1 MG/DL — SIGNIFICANT CHANGE UP (ref 8.4–10.5)
CHLORIDE SERPL-SCNC: 100 MMOL/L — SIGNIFICANT CHANGE UP (ref 98–107)
CO2 SERPL-SCNC: 26 MMOL/L — SIGNIFICANT CHANGE UP (ref 22–31)
COLOR SPEC: YELLOW — SIGNIFICANT CHANGE UP
COLOR SPEC: YELLOW — SIGNIFICANT CHANGE UP
CREAT SERPL-MCNC: 0.78 MG/DL — SIGNIFICANT CHANGE UP (ref 0.5–1.3)
DIFF PNL FLD: ABNORMAL
DIFF PNL FLD: NEGATIVE — SIGNIFICANT CHANGE UP
EOSINOPHIL # BLD AUTO: 0 K/UL — SIGNIFICANT CHANGE UP (ref 0–0.5)
EOSINOPHIL NFR BLD AUTO: 0 % — SIGNIFICANT CHANGE UP (ref 0–6)
EPI CELLS # UR: 2 /HPF — SIGNIFICANT CHANGE UP (ref 0–5)
GLUCOSE SERPL-MCNC: 126 MG/DL — HIGH (ref 70–99)
GLUCOSE UR QL: NEGATIVE — SIGNIFICANT CHANGE UP
GLUCOSE UR QL: NEGATIVE — SIGNIFICANT CHANGE UP
HCT VFR BLD CALC: 41.3 % — SIGNIFICANT CHANGE UP (ref 39–50)
HGB BLD-MCNC: 12.9 G/DL — LOW (ref 13–17)
IANC: 10.3 K/UL — HIGH (ref 1.5–8.5)
KETONES UR-MCNC: ABNORMAL
KETONES UR-MCNC: NEGATIVE — SIGNIFICANT CHANGE UP
LEUKOCYTE ESTERASE UR-ACNC: NEGATIVE — SIGNIFICANT CHANGE UP
LEUKOCYTE ESTERASE UR-ACNC: NEGATIVE — SIGNIFICANT CHANGE UP
LYMPHOCYTES # BLD AUTO: 0.47 K/UL — LOW (ref 1–3.3)
LYMPHOCYTES # BLD AUTO: 3.5 % — LOW (ref 13–44)
MCHC RBC-ENTMCNC: 24.7 PG — LOW (ref 27–34)
MCHC RBC-ENTMCNC: 31.2 GM/DL — LOW (ref 32–36)
MCV RBC AUTO: 79 FL — LOW (ref 80–100)
MONOCYTES # BLD AUTO: 1.19 K/UL — HIGH (ref 0–0.9)
MONOCYTES NFR BLD AUTO: 8.9 % — SIGNIFICANT CHANGE UP (ref 2–14)
N GONORRHOEA RRNA SPEC QL NAA+PROBE: SIGNIFICANT CHANGE UP
NEUTROPHILS # BLD AUTO: 11.26 K/UL — HIGH (ref 1.8–7.4)
NEUTROPHILS NFR BLD AUTO: 84.1 % — HIGH (ref 43–77)
NITRITE UR-MCNC: NEGATIVE — SIGNIFICANT CHANGE UP
NITRITE UR-MCNC: NEGATIVE — SIGNIFICANT CHANGE UP
PCP SPEC-MCNC: SIGNIFICANT CHANGE UP
PH UR: 6 — SIGNIFICANT CHANGE UP (ref 5–8)
PH UR: 7 — SIGNIFICANT CHANGE UP (ref 5–8)
PLATELET # BLD AUTO: 266 K/UL — SIGNIFICANT CHANGE UP (ref 150–400)
POTASSIUM SERPL-MCNC: 4.3 MMOL/L — SIGNIFICANT CHANGE UP (ref 3.5–5.3)
POTASSIUM SERPL-SCNC: 4.3 MMOL/L — SIGNIFICANT CHANGE UP (ref 3.5–5.3)
PROT SERPL-MCNC: 6.7 G/DL — SIGNIFICANT CHANGE UP (ref 6–8.3)
PROT UR-MCNC: ABNORMAL
PROT UR-MCNC: NEGATIVE — SIGNIFICANT CHANGE UP
RBC # BLD: 5.23 M/UL — SIGNIFICANT CHANGE UP (ref 4.2–5.8)
RBC # FLD: 15.7 % — HIGH (ref 10.3–14.5)
RBC CASTS # UR COMP ASSIST: 1 /HPF — SIGNIFICANT CHANGE UP (ref 0–4)
SODIUM SERPL-SCNC: 136 MMOL/L — SIGNIFICANT CHANGE UP (ref 135–145)
SP GR SPEC: 1.01 — SIGNIFICANT CHANGE UP (ref 1.01–1.02)
SP GR SPEC: 1.02 — SIGNIFICANT CHANGE UP (ref 1.01–1.02)
SPECIMEN SOURCE: SIGNIFICANT CHANGE UP
TOXICOLOGY SCREEN, DRUGS OF ABUSE, SERUM RESULT: SIGNIFICANT CHANGE UP
TSH SERPL-MCNC: 0.59 UIU/ML — SIGNIFICANT CHANGE UP (ref 0.27–4.2)
UROBILINOGEN FLD QL: 1
UROBILINOGEN FLD QL: SIGNIFICANT CHANGE UP
WBC # BLD: 13.39 K/UL — HIGH (ref 3.8–10.5)
WBC # FLD AUTO: 13.39 K/UL — HIGH (ref 3.8–10.5)
WBC UR QL: 1 /HPF — SIGNIFICANT CHANGE UP (ref 0–5)

## 2021-02-26 PROCEDURE — 90792 PSYCH DIAG EVAL W/MED SRVCS: CPT | Mod: GC

## 2021-02-26 PROCEDURE — 81001 URINALYSIS AUTO W/SCOPE: CPT

## 2021-02-26 PROCEDURE — 87086 URINE CULTURE/COLONY COUNT: CPT

## 2021-02-26 PROCEDURE — 99283 EMERGENCY DEPT VISIT LOW MDM: CPT

## 2021-02-26 PROCEDURE — 99284 EMERGENCY DEPT VISIT MOD MDM: CPT

## 2021-02-26 RX ORDER — ACETAMINOPHEN 500 MG
650 TABLET ORAL ONCE
Refills: 0 | Status: COMPLETED | OUTPATIENT
Start: 2021-02-26 | End: 2021-02-26

## 2021-02-26 RX ORDER — IBUPROFEN 200 MG
600 TABLET ORAL ONCE
Refills: 0 | Status: COMPLETED | OUTPATIENT
Start: 2021-02-26 | End: 2021-02-26

## 2021-02-26 RX ORDER — PHENAZOPYRIDINE HCL 100 MG
100 TABLET ORAL ONCE
Refills: 0 | Status: COMPLETED | OUTPATIENT
Start: 2021-02-26 | End: 2021-02-26

## 2021-02-26 RX ORDER — OXYBUTYNIN CHLORIDE 5 MG
5 TABLET ORAL ONCE
Refills: 0 | Status: COMPLETED | OUTPATIENT
Start: 2021-02-26 | End: 2021-02-26

## 2021-02-26 RX ADMIN — Medication 650 MILLIGRAM(S): at 03:47

## 2021-02-26 RX ADMIN — Medication 5 MILLIGRAM(S): at 07:01

## 2021-02-26 RX ADMIN — Medication 600 MILLIGRAM(S): at 04:51

## 2021-02-26 RX ADMIN — Medication 100 MILLIGRAM(S): at 04:52

## 2021-02-26 NOTE — ED PROVIDER NOTE - NSFOLLOWUPCLINICS_GEN_ALL_ED_FT
[facility] - Urology  Urology  [address], [address] & [address] [place] [address]  Phone: [phone]  Fax:   Follow Up Time: 1-3 Days     [facility] - Urology  Urology  [address]rd & [address]th [place] [address]  Phone: [phone]  Fax:   Follow Up Time: 1-3 Days

## 2021-02-26 NOTE — ED PROVIDER NOTE - OBJECTIVE STATEMENT
31 y/o M with a significant PMHx of cognitive impairment and factitious disorder is sent from [facility] reporting that he has been unable to urinate since this morning. Patient seen at [facility] yesterday and had Sarabia catheter removed prior to discharge. Denies fever, nausea, vomiting, abdominal pain, back pain or any other acute complaints. No history of smoking, alcohol or drug use.

## 2021-02-26 NOTE — ED BEHAVIORAL HEALTH ASSESSMENT NOTE - SUMMARY
30-year-old male; domiciled at [facility]; PPHx of intellectual disability, mood disorder, factitious disorder, past admissions, no known hx SA, +hx NSSIB; no known legal hx, +h/o aggression, BIB EMS  for suicide ideation, stating he was punching wall to hurt himself. Psychiatry is consulted for suicidal ideation.    Patient appears to be at low acute risk for suicide - while he is threatening to kill himself if not admitted and does have a history of NSSIB, he lacks past h/o SA's and admits that what he really wants is a psych admission in order to avoid having to go back to his group home. Risk further mitigated by patient being under 24 hours supervision at group home. Patient unable to return to group home until AM - will need reassessment. 30-year-old male; domiciled at [facility]; PPHx of intellectual disability, mood disorder, factitious disorder, past admissions, no known hx SA, +hx NSSIB; no known legal hx, +h/o aggression, BIB EMS  for suicide ideation, stating he was punching wall to hurt himself. Psychiatry is consulted for suicidal ideation.    Patient appears to be at low acute risk for suicide - while he is threatening to kill himself if not admitted and does have a history of NSSIB, he lacks past h/o SA's and admits that what he really wants is a psych admission in order to avoid having to go back to his group home. Risk further mitigated by patient being under 24 hours supervision at group home. Patient unable to return to group home until AM

## 2021-02-26 NOTE — ED PROVIDER NOTE - WET READ LAUNCH FT
Pt coming in for follow up of her urinary symptoms. No need for a call.     There are no Wet Read(s) to document.

## 2021-02-26 NOTE — ED BEHAVIORAL HEALTH NOTE - BEHAVIORAL HEALTH NOTE
Per MD Karson DAVIES to obtain collateral.    KEKE contacted pts group home, [facility] ([address] [place] [address]). SW spoke to EMILIE Pak ([phone]), Mellisa stated she is filling in at pts group home and is unaware of why pt is in ED and does not know pt. Mellisa stated she will have the  SW to provide more information. SW to follow. Per MD Karson DAVIES to obtain collateral.    KEKE contacted pts group home, [facility] ([address] [place] [address]). KEKE spoke to EMILIE Pak ([phone]), Mellisa stated she is filling in at pts group home and is unaware of why pt is in ED and does not know pt. Mellisa stated she will have the  SW to provide more information. SW to follow.    Per ANALI Escobar, pt is cleared to return to group home. KEKE spoke to Cassy, Director ([phone]). KEKE informed Cassy pt is d/c'ed and has an indwelling mckeon due to urinary retention and will have to follow up with a urologist in the community. Cassy explained to KEKE the residence does not have staff that are medically trained to deal with a mckeon and are just there to support the residents with basic needs. KEKE informed MD Yuan. Per MD Karson DAVIES to obtain collateral.    KEKE contacted pts group home, [facility] ([address] [place] [address]). KEKE spoke to EMILIE Pak ([phone]), Mellisa stated she is filling in at pts group home and is unaware of why pt is in ED and does not know pt. Mellisa stated she will have the  SW to provide more information. SW to follow.    Per ANALI Escobar, pt is cleared to return to group [place]. KEKE spoke to Cassy,  ([phone]). KEKE informed Cassy pt is d/c'ed and has an indwelling mckeon due to urinary retention and will have to follow up with a urologist in the community. Cassy explained to KEKE the residence does not have staff that are medically trained to deal with a mckeon and are just there to support the residents with basic needs. KEKE informed MD Yuan.    MD Yuan informed  pts mckeon was removed and pt urinated. KEKE contacted Cassy ([phone]) pt will be picked up by group home staff at 8am. ANALI Escobar informed of  time. No further SW needs at this time.

## 2021-02-26 NOTE — ED ADULT TRIAGE NOTE - CHIEF COMPLAINT QUOTE
sent in from [facility] home with difficulty urinating since 11 am. was discharged from [facility] today, patient says he could not urinate after mckeon catheter removed

## 2021-02-26 NOTE — ED ADULT NURSE NOTE - CHIEF COMPLAINT QUOTE
Pt. brought in by EMS for suicide ideation and states he was punching wall to hurt himself. Denies homicide ideation, auditory/visual hallucinations, use of alcohol/drugs. Also endorses chest pain that began yesterday, states was seen at [facility] and discharged this morning. PMHx: mood disorder, impulse control disorder, intellectual disability, hyperthyroidism. Spoke with  NP who states pt. to be seen in main ED.   2 bags of pt. belongings placed across from room 21 by PCA, valuables sent to security.

## 2021-02-26 NOTE — ED ADULT NURSE NOTE - COVID-19 ORDERING FACILITY
Implemented All Universal Safety Interventions:  [place] to call system. Call bell, personal items and telephone within reach. Instruct patient to call for assistance. Room bathroom lighting operational. Non-slip footwear when patient is off stretcher. Physically safe environment: no spills, clutter or unnecessary equipment. Stretcher in lowest position, wheels locked, appropriate side rails in place. [facility]

## 2021-02-26 NOTE — ED PROVIDER NOTE - PATIENT PORTAL LINK FT
You can access the FollowMyHealth Patient Portal offered by [facility] by registering at the following website: [email or web address]. By joining [employer]’s FollowMyHealth portal, you will also be able to view your health information using other applications (apps) compatible with our system.

## 2021-02-26 NOTE — ED PROVIDER NOTE - ATTENDING CONTRIBUTION TO CARE
HPI: 31 yo M with Past Medical History of MR, mood disorder, asthma, hyperthyroidism, presenting to the ED for SI. Per group home, patient was refusing to take his psych medications and threatening SI tonight. He currently denies SI but reports he has been unable to urinate all day. States he is currently being treated for a UTI but is unsure of what antibiotics he is taking. States he has needed a catheter multiple times before. He was seen earlier today at [facility] for CP and had negative ACS r/o. Currently denies SI, HI auditory, visual hallucinations despite what triage note says.   EXAM: NAD, eyes EOMI/PERRL, heart RRR, lungs ctab, abd soft tenderness to palpation suprapubic, no rebound/guarding. Denying SI/HI and does not appear to be responding to internal stimuli.   MDM: Pt with multiple medical problems including MR and facitictious disorder that presents from group home initially for complaints per EMS of SI as pt became aggressive for refusing to take his meds but now denies despite triage note. Asked several times and pt denies now but collateral per EMS and  states police required to be called. Pt does endorses urinary retention, will require mckeon. Will obtain labs and monitor and likely consult  vs obtain collateral from  as pt has history of malingering

## 2021-02-26 NOTE — ED PROVIDER NOTE - NSFOLLOWUPINSTRUCTIONS_ED_ALL_ED_FT
Dysuria    WHAT YOU NEED TO KNOW:    Dysuria is difficulty urinating, or pain, burning, or discomfort with urination. Dysuria is usually a symptom of another problem.     DISCHARGE INSTRUCTIONS:    Return to the emergency department if:   •You have severe back, side, or abdominal pain.       •You have fever and shaking chills.       •You vomit several times in a row.       Contact your healthcare provider if:   •Your symptoms do not go away, even after treatment.       •You have questions or concerns about your condition or care.       Medicines:   •Medicines may be given to help treat a bacterial infection or help decrease bladder spasms.      •Take your medicine as directed. Contact your healthcare provider if you think your medicine is not helping or if you have side effects. Tell him of her if you are allergic to any medicine. Keep a list of the medicines, vitamins, and herbs you take. Include the amounts, and when and why you take them. Bring the list or the pill bottles to follow-up visits. Carry your medicine list with you in case of an emergency.      Follow up with your healthcare provider as directed: Your healthcare provider may also refer you to a urologist or nephrologist to have additional testing. Write down your questions so you remember to ask them during your visits.     Manage your dysuria:   •Drink more liquids. Liquids help flush out bacteria that may be causing an infection. Ask your healthcare provider how much liquid to drink each day and which liquids are best for you.       •Take sitz baths as directed. Fill a bathtub with 4 to 6 inches of warm water. You may also use a sitz bath pan that fits over a toilet. Sit in the sitz bath for 20 minutes. Do this 2 to 3 times a day, or as directed. The warm water can help decrease pain and swelling.          © Copyright [employer] 2021           back to top                          © Copyright [employer] 2021

## 2021-02-26 NOTE — ED ADULT NURSE NOTE - CHIEF COMPLAINT QUOTE
sent in from [facility] home with difficulty urinating since 11 am. was discharged from [facility] today, patient says he could not urinate after mckeon catheter removed

## 2021-02-26 NOTE — ED ADULT NURSE NOTE - OBJECTIVE STATEMENT
Patient is awake, A&O x 3, appears uncomfortable, complaining of unable to urinate since yesterday and having pain in his groin area.  States, he has a past medical history of urinary retention with mckeon placement.  Spoke to staff at group home, per staff member, patient was threatening to hurt himself, punching the walls and refused to take any of his psych meds.  Upon arrival at ED. patient denied SI/HI.  14 FR mckeon placed in ED, patient tolerated well.  Denies nausea, vomiting, abdominal pain or diarrhea.  History of MR, impulse control and mood disorder.  Labs drawn and sent.  Will continue to monitor patient.

## 2021-02-26 NOTE — ED BEHAVIORAL HEALTH ASSESSMENT NOTE - DETAILS
see HPI c/o bladder pain zyprexa - rash the group home pt will talk to his mother if he feels worse, HE has good support at group home too. Patient will continue taking his meds

## 2021-02-26 NOTE — ED PROVIDER NOTE - OBJECTIVE STATEMENT
31 y/o male with hx MR, mood disorder, asthma, hyperthyroidism, presenting to the ED for SI. Per group home, patient was refusing to take his psych medications and threatening SI tonight. He currently denies SI but reports he has been unable to urinate all day. States he is currently being treated for a UTI but is unsure of what antibiotics he is taking. States he has needed a catheter multiple times before. He was seen earlier today at [facility] for CP and had negative ACS r/o. Currently denies SI, HI auditory, visual hallucinations.

## 2021-02-26 NOTE — ED ADULT NURSE NOTE - NSIMPLEMENTINTERV_GEN_ALL_ED
Implemented All Universal Safety Interventions:  [place] to call system. Call bell, personal items and telephone within reach. Instruct patient to call for assistance. Room bathroom lighting operational. Non-slip footwear when patient is off stretcher. Physically safe environment: no spills, clutter or unnecessary equipment. Stretcher in lowest position, wheels locked, appropriate side rails in place.

## 2021-02-26 NOTE — ED BEHAVIORAL HEALTH ASSESSMENT NOTE - PAST PSYCHOTROPIC MEDICATION
Past Psychotropic Medication  zyprexa; divalproex ; haloperidol ; benztropine  docusate 100mg po TID, clonidine 0.1mg po TID; omeprazole 20mg po qAM; simvastatin 5mg po daily; hydroxy hcl 25mg po BID; levothyroxine 50mcg po daily; vitamin d3 1000IU po daily

## 2021-02-26 NOTE — ED BEHAVIORAL HEALTH ASSESSMENT NOTE - HPI (INCLUDE ILLNESS QUALITY, SEVERITY, DURATION, TIMING, CONTEXT, MODIFYING FACTORS, ASSOCIATED SIGNS AND SYMPTOMS)
30-year-old male; domiciled at [facility] group [place]; PPHx of intellectual disability, mood disorder, factitious disorder, past admissions, no known hx SA, +hx NSSIB; no known legal hx, +h/o aggression, BIB EMS  for suicide ideation, stating he was punching wall to hurt himself. Psychiatry is consulted for suicidal ideation.     Pt seen and examined at bedside. He is in visible distress from bladder pain. He states that he wants psychiatric admission because he does not want to go back to his group home. Says that he has been depressed since he had a breakup with his girlfriend 2 months ago, and that he does not like the staff and his housemates at his group home, and this makes him want to harm himself. Patient states that if he is not admitted to psychiatry he will kill himself. First he states that he will do this by punching a wall, and when interviewer asks how punching the wall will kill him, patient then indicates with a hand motion across his neck that he will cut his throat, stating that he will do this by getting a knife from the kitchen in his group home. Patient endorses good sleep and appetite. As interview progresses, patient clarifies that he wants to be admitted because he doesn't want to go back to his group home, and that if he can't find new housing tonight he wants to be admitted and wait until he can find new housing. Endorses occasional alcohol use. Denies other substance use. Denies AH/VH/psychotic symptoms. Denies HI/thoughts of harming others. 30-year-old male; domiciled at [facility] group [place]; PPHx of intellectual disability, mood disorder, factitious disorder, past admissions, no known hx SA, +hx NSSIB; no known legal hx, +h/o aggression, BIB EMS  for suicide ideation, stating he was punching wall to hurt himself. Psychiatry is consulted for suicidal ideation.     Pt seen and examined at bedside. He is in visible distress from bladder pain. He states that he wants psychiatric admission because he does not want to go back to his group home. Says that he has been depressed since he had a breakup with his girlfriend 2 months ago, and that he does not like the staff and his housemates at his group home, and this makes him want to harm himself. Patient states that if he is not admitted to psychiatry he will kill himself. First he states that he will do this by punching a wall, and when interviewer asks how punching the wall will kill him, patient then indicates with a hand motion across his neck that he will cut his throat, stating that he will do this by getting a knife from the kitchen in his group home. Patient endorses good sleep and appetite. As interview progresses, patient clarifies that he wants to be admitted because he doesn't want to go back to his group home, and that if he can't find new housing tonight he wants to be admitted and wait until he can find new housing. Endorses occasional alcohol use. Denies other substance use. Denies AH/VH/psychotic symptoms. Denies HI/thoughts of harming others.    Writer returned to see patient, and patient stated that he changed his mind and wants to go back to his group home and does not want psychiatric admission, stating that he spoke with his mom who convinced him that this was the right decision.

## 2021-02-26 NOTE — ED PROVIDER NOTE - PHYSICAL EXAMINATION
GENERAL: Awake, alert, NAD  HEENT: NC/AT, moist mucous membranes  LUNGS: CTAB, no wheezes or crackles   CARDIAC: RRR, no m/r/g  ABDOMEN: Soft, normal BS, mild suprapubic tenderness, non distended, no rebound, no guarding  BACK: No midline spinal tenderness, no CVA tenderness  EXT: No edema, no calf tenderness, 2+ DP pulses bilaterally, no deformities.  NEURO: A&Ox3. Moving all extremities.  SKIN: Warm and dry. No rash.  PSYCH: Mildly anxious.

## 2021-02-26 NOTE — ED ADULT NURSE NOTE - OBJECTIVE STATEMENT
Patient sent in from group [place] with c/o painful urinating. As per patient he was was discharged from [facility] today, mckeon catheter was removed and since 11AM unable to urinate without pain. Patient voided clear, sudeep urine. Patient with significant psych hx, 1:1 for safety intiated.

## 2021-02-26 NOTE — ED PROVIDER NOTE - PATIENT PORTAL LINK FT
You can access the FollowMyHealth Patient Portal offered by [facility] by registering at the following website: [email or web address]. By joining [employer]’s FollowMyHealth portal, you will also be able to view your health information using other applications (apps) compatible with our system. You can access the FollowMyHealth Patient Portal offered by [facility] by registering at the following website: [email or web address]. By joining [employer]’s FollowMyHealth portal, you will also be able to view your health information using other applications (apps) compatible with our system.

## 2021-02-26 NOTE — ED ADULT NURSE REASSESSMENT NOTE - NS ED NURSE REASSESS COMMENT FT1
Pt received from main ER with indwelling Sarabia and leg bag in place. Pt c/o penile pain s/p Sarabia insertion, MD made aware and medication administered as ordered.

## 2021-02-26 NOTE — ED PROVIDER NOTE - NSFOLLOWUPINSTRUCTIONS_ED_ALL_ED_FT
Please follow up with your primary care doctor after you leave the emergency department so that they can follow up and conduct more testing and treatment as they deem necessary. If you have worsening signs or symptoms of what you came in to the Emergency Department today and are not able to see your doctor, go to your nearest emergency department or return to the [facility] emergency department for further care and management.     MR. CORTES, PLEASE FOLLOW UP WITH YOUR PRIMARY CARE DOCTOR AS WELL AS A UROLOGIST TO HAVE YOUR BENTON CATHETER REMOVED AND TO HELP DETERMINE WHAT IS CAUSING YOUR URINE RETENTION.   DO NOT REMOVE THE BENTON CATHETER YOURSELF.     TO DRAIN THE BENTON CATHETER, REMOVE THE GREEN CAP AT THE BOTTOM OF THE BAG TO DRAIN THE URINE AND REPLACE THE CAP AFTERWARD TO ENSURE IF DOES NOT LEAK.     THE MEDICATION GIVEN TO YOU (PYRIDIUM) WILL TURN YOUR URINE ORANGE, THIS IS A SIDE EFFECT OF THE MEDICATION AND DOES NOT MEAN THAT YOU HAVE ANY INFECTION OR ABNORMALITY. Please follow up with your primary care doctor after you leave the emergency department so that they can follow up and conduct more testing and treatment as they deem necessary. If you have worsening signs or symptoms of what you came in to the Emergency Department today and are not able to see your doctor, go to your nearest emergency department or return to the [facility] emergency department for further care and management.     MR. CORTES, PLEASE FOLLOW UP WITH YOUR PRIMARY CARE DOCTOR AS WELL AS A UROLOGIST TO DETERMINE WHAT CAUSED YOUR PROBLEM URINATING. IN THE MEANTIME MAKE SURE YOU DRINK A LOT OF WATER/FLUIDS TO HELP YOU URINATE.   FOR PAIN, TAKE TYLENOL AS NEEDED FOR PAIN.

## 2021-02-26 NOTE — ED BEHAVIORAL HEALTH ASSESSMENT NOTE - CURRENT MEDICATION
tamsulosin 0.4 mg oral capsule: 1 cap(s) orally once a day (at bedtime)  · 	finasteride 5 mg oral tablet: 1 tab(s) orally once a day  · 	amantadine 100 mg oral tablet: 1 tab(s) orally 2 times a day  · 	baclofen 10 mg oral tablet: 1 tab(s) orally 3 times a day  · 	Dulera 100 mcg-5 mcg/inh inhalation aerosol: 2 puff(s) inhaled 2 times a day  · 	FLUoxetine 10 mg oral capsule: 1 cap(s) orally once a day  · 	FLUoxetine 20 mg oral capsule: 1 cap(s) orally once a day  · 	gabapentin 800 mg oral tablet: 1 tab(s) orally 3 times a day  · 	levothyroxine 50 mcg (0.05 mg) oral tablet: 1 tab(s) orally once a day  · 	pantoprazole 40 mg oral delayed release tablet: 1 tab(s) orally once a day  · 	polyethylene glycol 3350 oral powder for reconstitution: 1 each orally once a day  · 	simvastatin 5 mg oral tablet: 1 tab(s) orally once a day (at bedtime)  · 	Vitamin D2 50,000 intl units (1.25 mg) oral capsule: 1 cap(s) orally once a week  · 	LORazepam 1 mg oral tablet: 1 tab(s) orally 2 times a day  · 	Cogentin 1 mg oral tablet: 1 tab(s) orally 2 times a day  · 	cloNIDine 0.1 mg oral tablet: 1 tab(s) orally once a day (at bedtime)  · 	OXcarbazepine 300 mg oral tablet: 1 tab(s) orally 2 times a day  · 	lithium carbonate: 150 milligram(s) orally 2 times a day  · 	Depakote 500 mg oral delayed release tablet: 1 tab(s) orally 2 times a day  · 	perphenazine 4 mg oral tablet: 1 tab(s) orally 3 times a day

## 2021-02-26 NOTE — ED ADULT NURSE REASSESSMENT NOTE - NS ED NURSE REASSESS COMMENT FT1
pt calm & cooperative cleared by psych d/c by provider pt denies si/hi/avh presently, FOZIA duran completed pt d/c to group home.

## 2021-02-26 NOTE — ED ADULT NURSE NOTE - NSIMPLEMENTINTERV_GEN_ALL_ED
Implemented All Universal Safety Interventions:  [place] to call system. Call bell, personal items and telephone within reach. Instruct patient to call for assistance. Room bathroom lighting operational. Non-slip footwear when patient is off stretcher. Physically safe environment: no spills, clutter or unnecessary equipment. Stretcher in lowest position, wheels locked, appropriate side rails in place.

## 2021-02-26 NOTE — ED PROVIDER NOTE - CLINICAL SUMMARY MEDICAL DECISION MAKING FREE TEXT BOX
31 y/o M presents with complaint of urinary retention. Patient spontaneously urinating here, UA shows no evidence of UTI. Will send patient back to group home. Discussed with Zain at group home, who states they will offer transportation back home.

## 2021-02-26 NOTE — ED BEHAVIORAL HEALTH ASSESSMENT NOTE - RISK ASSESSMENT
Low Acute Suicide Risk Pt is at chronically elevated risk of harm to self/others given h/o self-harm, h/o aggression, poor impulse control and poor frustration tolerance due to intellectual disability,   protective factors include being domiciled, no hx SA, on 1:1 supervision at group home, no access to guns/weapons, denies insomnia, denies SI/HI, future-oriented, help-seeking.   Pt is not at acutely elevated risk of harm to self or others.

## 2021-02-26 NOTE — ED PROVIDER NOTE - PROGRESS NOTE DETAILS
LUIS ENRIQUE: I was signed out this pt pending  eval for statements made at  regarding SI. pt seen and eval'd by Psych and they do not think pt requires intpt hospitalization. Cleared psychiatrically to go back to . Pt also medically cleared. unknown cause of his urinary retention. Will discharge to have pt follow up with a UROLOGIST in 1-2 days for mckeon removal. Will Rx'd tylenol PRN for pain. LUIS ENRIQUE: I was signed out this pt pending  eval for statements made at  regarding SI. pt seen and eval'd by Psych and they do not think pt requires intpt hospitalization. Cleared psychiatrically to go back to . Pt also medically cleared. unknown cause of his urinary retention. Pt UA neg for infection, Utox neg. Will trial removal of mckeon and provide PO hydration and trial urination. if able to urinate then will discharge back to  without mckeon but follow up with UROLOGY outpt. If not able to void spontaneously then will replace mckeon and require admission vs URO consult in ED as  will not take pt back with mckeon per SW. LUIS ENRIQUE: Pt mckeon removed, spontaneously urinated 500 cc urine. Will discharge to f/u with URO outpt.

## 2021-02-26 NOTE — ED BEHAVIORAL HEALTH ASSESSMENT NOTE - CASE SUMMARY
The patient is 30-year-old male; domiciled at [facility]; PPHx of intellectual disability, mood disorder, factitious disorder, past admissions, no known hx SA, +hx NSSIB; no known legal hx, +h/o aggression, BIB EMS  for suicide ideation, stating he was punching wall to hurt himself. Psychiatry is consulted for suicidal ideation.  He reports that he was upset because hie girlfriend broke up with him and that he does not want to be at group home, He states that he is feeling better after he spent a night in ER and spoke with his mother. HE wants to go back to group home. HE states that he is not "depressed anymore, I am over her, she doesn't want to be with me, it's OK" He states that he has good appetite, no anhedonia, He denies any active or passive SI, no I/P. HE denies any psychotic symptoms, no voices, visions or delusions. No racing thoughts, diminished need for sleep or goal directed activity.   I spoke with Mr Yun:  who reported that "this behavior is not new" He states that patient has no Hx of SA, but he has HX of SIB, patient has been compliant with his meds and they feel safe to take him back.   Patient appears to be at low acute risk for suicide - while he is threatening to kill himself if not admitted and does have a history of NSSIB, he lacks past h/o SA's and admits that what he really wants is a psych admission in order to avoid having to go back to his group home. Risk further mitigated by patient being under 24 hours supervision at group home. Patient will be d/yasmin

## 2021-02-26 NOTE — ED PROVIDER NOTE - CLINICAL SUMMARY MEDICAL DECISION MAKING FREE TEXT BOX
29 y/o male with hx of MR, mood disorder, asthma, hyperthyroidism, presenting to the ED from group home for SI now c/o urinary retention. Vitals stable upon arrival. Patient with mild suprapubic tenderness, unable to avoid. Plan to insert mckeon, check UA and GC as reports concern for STD. Currently denies any SI or HI however based on collateral obtained will send BH labs and obtain BH evaluation. Currently denies any active CP, ACS r/o negative from this morning. Eugenio Rosario, DO PGY2

## 2021-02-27 LAB
CULTURE RESULTS: NO GROWTH — SIGNIFICANT CHANGE UP
SPECIMEN SOURCE: SIGNIFICANT CHANGE UP

## 2021-02-28 LAB
CULTURE RESULTS: NO GROWTH — SIGNIFICANT CHANGE UP
SPECIMEN SOURCE: SIGNIFICANT CHANGE UP

## 2021-03-01 NOTE — ED BEHAVIORAL HEALTH NOTE - BEHAVIORAL HEALTH NOTE
High Risk Log:  Worker called patient's group home multiple times [phone] and was put on hold twice. Worker called back but phone rang out.

## 2021-03-02 ENCOUNTER — EMERGENCY (EMERGENCY)
Facility: HOSPITAL | Age: 31
LOS: 1 days | Discharge: HOME | End: 2021-03-02
Attending: EMERGENCY MEDICINE
Payer: MEDICAID

## 2021-03-02 VITALS
TEMPERATURE: 98 F | SYSTOLIC BLOOD PRESSURE: 141 MMHG | HEIGHT: 71 IN | WEIGHT: 250 LBS | RESPIRATION RATE: 16 BRPM | OXYGEN SATURATION: 99 % | HEART RATE: 88 BPM | DIASTOLIC BLOOD PRESSURE: 87 MMHG

## 2021-03-02 VITALS
TEMPERATURE: 98 F | RESPIRATION RATE: 16 BRPM | OXYGEN SATURATION: 99 % | HEART RATE: 71 BPM | SYSTOLIC BLOOD PRESSURE: 112 MMHG | DIASTOLIC BLOOD PRESSURE: 74 MMHG

## 2021-03-02 LAB
ALBUMIN SERPL ELPH-MCNC: 3.2 G/DL — LOW (ref 3.5–5)
ALP SERPL-CCNC: 101 U/L — SIGNIFICANT CHANGE UP (ref 40–120)
ALT FLD-CCNC: 21 U/L DA — SIGNIFICANT CHANGE UP (ref 10–60)
ANION GAP SERPL CALC-SCNC: 5 MMOL/L — SIGNIFICANT CHANGE UP (ref 5–17)
APAP SERPL-MCNC: 2 UG/ML — LOW (ref 10–30)
APPEARANCE UR: CLEAR — SIGNIFICANT CHANGE UP
AST SERPL-CCNC: 12 U/L — SIGNIFICANT CHANGE UP (ref 10–40)
BASOPHILS # BLD AUTO: 0.02 K/UL — SIGNIFICANT CHANGE UP (ref 0–0.2)
BASOPHILS NFR BLD AUTO: 0.2 % — SIGNIFICANT CHANGE UP (ref 0–2)
BILIRUB SERPL-MCNC: <0.1 MG/DL — LOW (ref 0.2–1.2)
BILIRUB UR-MCNC: NEGATIVE — SIGNIFICANT CHANGE UP
BUN SERPL-MCNC: 22 MG/DL — HIGH (ref 7–18)
CALCIUM SERPL-MCNC: 8.8 MG/DL — SIGNIFICANT CHANGE UP (ref 8.4–10.5)
CHLORIDE SERPL-SCNC: 104 MMOL/L — SIGNIFICANT CHANGE UP (ref 96–108)
CO2 SERPL-SCNC: 29 MMOL/L — SIGNIFICANT CHANGE UP (ref 22–31)
COLOR SPEC: YELLOW — SIGNIFICANT CHANGE UP
CREAT SERPL-MCNC: 0.9 MG/DL — SIGNIFICANT CHANGE UP (ref 0.5–1.3)
DIFF PNL FLD: NEGATIVE — SIGNIFICANT CHANGE UP
EOSINOPHIL # BLD AUTO: 0.2 K/UL — SIGNIFICANT CHANGE UP (ref 0–0.5)
EOSINOPHIL NFR BLD AUTO: 2.3 % — SIGNIFICANT CHANGE UP (ref 0–6)
ETHANOL SERPL-MCNC: <3 MG/DL — SIGNIFICANT CHANGE UP (ref 0–10)
GLUCOSE SERPL-MCNC: 109 MG/DL — HIGH (ref 70–99)
GLUCOSE UR QL: NEGATIVE — SIGNIFICANT CHANGE UP
HCT VFR BLD CALC: 42.9 % — SIGNIFICANT CHANGE UP (ref 39–50)
HGB BLD-MCNC: 13.3 G/DL — SIGNIFICANT CHANGE UP (ref 13–17)
IMM GRANULOCYTES NFR BLD AUTO: 1.7 % — HIGH (ref 0–1.5)
KETONES UR-MCNC: ABNORMAL
LEUKOCYTE ESTERASE UR-ACNC: NEGATIVE — SIGNIFICANT CHANGE UP
LYMPHOCYTES # BLD AUTO: 3.13 K/UL — SIGNIFICANT CHANGE UP (ref 1–3.3)
LYMPHOCYTES # BLD AUTO: 35.7 % — SIGNIFICANT CHANGE UP (ref 13–44)
MCHC RBC-ENTMCNC: 24.8 PG — LOW (ref 27–34)
MCHC RBC-ENTMCNC: 31 GM/DL — LOW (ref 32–36)
MCV RBC AUTO: 80 FL — SIGNIFICANT CHANGE UP (ref 80–100)
MONOCYTES # BLD AUTO: 0.77 K/UL — SIGNIFICANT CHANGE UP (ref 0–0.9)
MONOCYTES NFR BLD AUTO: 8.8 % — SIGNIFICANT CHANGE UP (ref 2–14)
NEUTROPHILS # BLD AUTO: 4.49 K/UL — SIGNIFICANT CHANGE UP (ref 1.8–7.4)
NEUTROPHILS NFR BLD AUTO: 51.3 % — SIGNIFICANT CHANGE UP (ref 43–77)
NITRITE UR-MCNC: NEGATIVE — SIGNIFICANT CHANGE UP
NRBC # BLD: 0 /100 WBCS — SIGNIFICANT CHANGE UP (ref 0–0)
PCP SPEC-MCNC: SIGNIFICANT CHANGE UP
PH UR: 7 — SIGNIFICANT CHANGE UP (ref 5–8)
PLATELET # BLD AUTO: 279 K/UL — SIGNIFICANT CHANGE UP (ref 150–400)
POTASSIUM SERPL-MCNC: 4.3 MMOL/L — SIGNIFICANT CHANGE UP (ref 3.5–5.3)
POTASSIUM SERPL-SCNC: 4.3 MMOL/L — SIGNIFICANT CHANGE UP (ref 3.5–5.3)
PROT SERPL-MCNC: 6.6 G/DL — SIGNIFICANT CHANGE UP (ref 6–8.3)
PROT UR-MCNC: NEGATIVE — SIGNIFICANT CHANGE UP
RBC # BLD: 5.36 M/UL — SIGNIFICANT CHANGE UP (ref 4.2–5.8)
RBC # FLD: 15.4 % — HIGH (ref 10.3–14.5)
SALICYLATES SERPL-MCNC: <1.7 MG/DL — LOW (ref 2.8–20)
SARS-COV-2 IGG SERPL QL IA: NEGATIVE — SIGNIFICANT CHANGE UP
SARS-COV-2 IGM SERPL IA-ACNC: 0.08 INDEX — SIGNIFICANT CHANGE UP
SARS-COV-2 RNA SPEC QL NAA+PROBE: SIGNIFICANT CHANGE UP
SODIUM SERPL-SCNC: 138 MMOL/L — SIGNIFICANT CHANGE UP (ref 135–145)
SP GR SPEC: 1.01 — SIGNIFICANT CHANGE UP (ref 1.01–1.02)
UROBILINOGEN FLD QL: 1
WBC # BLD: 8.76 K/UL — SIGNIFICANT CHANGE UP (ref 3.8–10.5)
WBC # FLD AUTO: 8.76 K/UL — SIGNIFICANT CHANGE UP (ref 3.8–10.5)

## 2021-03-02 PROCEDURE — 86769 SARS-COV-2 COVID-19 ANTIBODY: CPT

## 2021-03-02 PROCEDURE — 93005 ELECTROCARDIOGRAM TRACING: CPT

## 2021-03-02 PROCEDURE — 99285 EMERGENCY DEPT VISIT HI MDM: CPT

## 2021-03-02 PROCEDURE — 85025 COMPLETE CBC W/AUTO DIFF WBC: CPT

## 2021-03-02 PROCEDURE — 36415 COLL VENOUS BLD VENIPUNCTURE: CPT

## 2021-03-02 PROCEDURE — 87635 SARS-COV-2 COVID-19 AMP PRB: CPT

## 2021-03-02 PROCEDURE — 73130 X-RAY EXAM OF HAND: CPT | Mod: 26,RT

## 2021-03-02 PROCEDURE — 80307 DRUG TEST PRSMV CHEM ANLYZR: CPT

## 2021-03-02 PROCEDURE — 81003 URINALYSIS AUTO W/O SCOPE: CPT

## 2021-03-02 PROCEDURE — 80053 COMPREHEN METABOLIC PANEL: CPT

## 2021-03-02 PROCEDURE — 99285 EMERGENCY DEPT VISIT HI MDM: CPT | Mod: 25

## 2021-03-02 PROCEDURE — U0005: CPT

## 2021-03-02 PROCEDURE — 73130 X-RAY EXAM OF HAND: CPT

## 2021-03-02 RX ORDER — ACETAMINOPHEN 500 MG
650 TABLET ORAL ONCE
Refills: 0 | Status: DISCONTINUED | OUTPATIENT
Start: 2021-03-02 | End: 2021-03-05

## 2021-03-02 RX ADMIN — Medication 1 MILLIGRAM(S): at 03:37

## 2021-03-02 NOTE — ED PROVIDER NOTE - PATIENT PORTAL LINK FT
You can access the FollowMyHealth Patient Portal offered by [facility] by registering at the following website: [email or web address]. By joining [employer]’s FollowMyHealth portal, you will also be able to view your health information using other applications (apps) compatible with our system.

## 2021-03-02 NOTE — ED BEHAVIORAL HEALTH NOTE - BEHAVIORAL HEALTH NOTE
high risk log: Writer called Angeli  spoke to felice who states he doesn't know how pt is doing because he is new.  He could be heard asking people in the background and call was placed on hold for extended period of time.

## 2021-03-02 NOTE — ED ADULT NURSE REASSESSMENT NOTE - NS ED NURSE REASSESS COMMENT FT1
Pt requested IV HL to be removed, Dr Shields said OK to do it  Pt on 1:1 , denies SI/HI, states he is calm and feels a lot better, he is ready to go home

## 2021-03-02 NOTE — ED PROVIDER NOTE - CLINICAL SUMMARY MEDICAL DECISION MAKING FREE TEXT BOX
804a- Denies suicidal ideation, currently cooperative and stable to return to group home.  Pt is well appearing, has no new complaints and able to walk with normal gait. Pt is stable for discharge and follow up with medical doctor. Pt educated on care and need for follow up. Discussed anticipatory guidance and return precautions. Questions answered. I had a detailed discussion with the patient and/or guardian regarding the historical points, exam findings, and any diagnostic results supporting the discharge diagnosis.

## 2021-03-02 NOTE — ED PROVIDER NOTE - NSFOLLOWUPINSTRUCTIONS_ED_ALL_ED_FT
Return to ER if you have pain, fever, vomiting, feel depressed, anxiousany concerns.  See your doctor as soon as possible (within 1-2 days).   If you need further assistance for appointments you can contact the [place] Care Coordinator at [phone] or the Health system Patient Access Services helpline at [phone] to find names/contact #s for a practitioner to follow up with.  Bring your discharge papers / test results with you to any follow up appointments.   In addition our outpatient [facility] is located at [address] [place] [address], tel: [phone].

## 2021-03-02 NOTE — ED PROVIDER NOTE - OBJECTIVE STATEMENT
Chief complaint of right hand thenar area tenderness. Pt punched wall and sustained surgery in Dec 2020 and February 2021. Pt states he feels agitated and requesting to speak to psychiatrist, stating he is hearing voices to hurt himself and punch wall again. Pt denies and suicidal attempts or self inflicted injury prior to arrival.  Meds: Depakote Chief complaint of right hand thenar area tenderness. Pt punched wall and sustained surgery in Dec 2020 and February 2021. Pt states he feels agitated and requesting to speak to psychiatrist,. Pt denies and suicidal attempts or self inflicted injury prior to arrival.  Meds: Depakote

## 2021-03-02 NOTE — ED PROVIDER NOTE - PHYSICAL EXAMINATION
Right hand +healed surgical scar to dorsal thenar area. distal radial and ulnar pulses intact, cap refill < 2 seconds, +flexion and extension of all digits at DIP and PIP.

## 2021-03-02 NOTE — ED PROVIDER NOTE - NSFOLLOWUPCLINICS_GEN_ALL_ED_FT
[facility]  Internal Medicine  [address] [place] [address]  Phone: [phone]  Fax: [phone]  Follow Up Time:

## 2021-03-02 NOTE — ED PROVIDER NOTE - RATE
Relevant Problems   No relevant active problems       Anesthetic History   No history of anesthetic complications            Review of Systems / Medical History  Patient summary reviewed, nursing notes reviewed and pertinent labs reviewed    Pulmonary                Comments: Seasonal allergies   Neuro/Psych   Within defined limits           Cardiovascular  Within defined limits                Exercise tolerance: >4 METS     GI/Hepatic/Renal  Within defined limits              Endo/Other  Within defined limits           Other Findings   Comments:  Torn left ACL           Physical Exam    Airway  Mallampati: I  TM Distance: > 6 cm  Neck ROM: normal range of motion   Mouth opening: Normal     Cardiovascular    Rhythm: regular  Rate: normal         Dental  No notable dental hx       Pulmonary  Breath sounds clear to auscultation               Abdominal  GI exam deferred       Other Findings            Anesthetic Plan    ASA: 1  Anesthesia type: general and regional - femoral single shot and sciatic single shot    Monitoring Plan: BIS      Induction: Intravenous  Anesthetic plan and risks discussed with: Patient
66

## 2021-03-02 NOTE — ED PROVIDER NOTE - PROGRESS NOTE DETAILS
DW telepsych attending, pt for clearance labs prior to consult. Pt resting, awaiting telepsych consult. pt is resident at [facility], staff member Mrs. Acuna informed. Pt now is in no distress and denies intent to self harm. Pt had behavior health consult on 2/26 reporting pt in low risk for to hurt himself.. Pt is stable for discharge back to his group home. I spoke to staff member Mr. Babb who arrange  of pt.

## 2021-03-18 ENCOUNTER — EMERGENCY (EMERGENCY)
Facility: HOSPITAL | Age: 31
LOS: 1 days | Discharge: HOME | End: 2021-03-18
Attending: EMERGENCY MEDICINE | Admitting: EMERGENCY MEDICINE
Payer: MEDICAID

## 2021-03-18 VITALS
WEIGHT: 199.96 LBS | TEMPERATURE: 98 F | HEART RATE: 100 BPM | SYSTOLIC BLOOD PRESSURE: 123 MMHG | RESPIRATION RATE: 18 BRPM | DIASTOLIC BLOOD PRESSURE: 80 MMHG | OXYGEN SATURATION: 96 % | HEIGHT: 71 IN

## 2021-03-18 PROCEDURE — 99284 EMERGENCY DEPT VISIT MOD MDM: CPT

## 2021-03-18 RX ORDER — IBUPROFEN 200 MG
600 TABLET ORAL ONCE
Refills: 0 | Status: COMPLETED | OUTPATIENT
Start: 2021-03-18 | End: 2021-03-18

## 2021-03-18 RX ORDER — ACETAMINOPHEN 500 MG
650 TABLET ORAL ONCE
Refills: 0 | Status: COMPLETED | OUTPATIENT
Start: 2021-03-18 | End: 2021-03-18

## 2021-03-18 RX ADMIN — Medication 600 MILLIGRAM(S): at 22:47

## 2021-03-18 RX ADMIN — Medication 650 MILLIGRAM(S): at 22:48

## 2021-03-18 NOTE — ED ADULT TRIAGE NOTE - CHIEF COMPLAINT QUOTE
Pt BIBA for compliant of pain to right hand s/p was treated in [facility] for right broken hand has a cast on compliant of pain to his right hand and abd pain with diarrhea.

## 2021-03-18 NOTE — ED PROVIDER NOTE - NSFOLLOWUPINSTRUCTIONS_ED_ALL_ED_FT
Log Out.      [facility] CareNotes®     :  [facility]  	                       CAST CARE - AfterCare(R) Instructions(ER/ED)           Cast Care    WHAT YOU NEED TO KNOW:    Cast care will help the cast dry and harden correctly, and then protect it until it comes off. Your cast may need up to 48 hours to dry and harden completely. Even after your cast hardens, it can be damaged.     DISCHARGE INSTRUCTIONS:    Return to the emergency department if:   •Your cast breaks or gets damaged.       •You see drainage, or your cast is stained or smells bad.       •Your skin turns blue or pale.       •Your skin tingles, burns, or is cold or numb.      •You have severe pain that is getting worse and does not go away after you take pain medicine.      •Your limb swells, or your cast looks or feels tighter than it was before.       Contact your healthcare provider if:   •Something falls into your cast and gets stuck.      •You have itching, pain, burning, or weakness where you have the cast.       •You have a fever.       •You have sores, blisters, or breaks on the skin around the edges of the cast.      •You have questions or concerns about your condition or care.      Follow up with your healthcare provider as directed: You will need to return to have your cast removed and your bones checked. Write down your questions so you remember to ask them during your visits.    Care for your cast while it hardens:   •Protect the cast. Do not put weight on the cast. Do not bend, lean on, or hit the cast with anything. Use the palms of your hands when you move the cast. Do not use your fingers. Your fingers may leave marks on the cast as it dries.      •Change positions often. Change your position every 2 hours to help the cast dry faster. Prop your cast on something soft, such as a pillow, to prevent a flat area on your cast.       •Keep the cast dry. Tie plastic trash bags around your cast to keep it dry while you bathe. You may use a blow dryer on cool or the lowest heat setting to dry your cast if it gets wet. Do not use a high heat setting, because you may burn your skin. Certain casts can get wet. Ask if you have a waterproof cast.      Care for your cast after it hardens:   •Check your cast every day. Contact your healthcare provider if you notice any cracks, dents, holes, or flaking on your cast.       •Keep your cast clean and dry. Cover your cast with a towel when you eat. You may have a small piece of cast that can be removed to check on incisions under your cast. Make sure the small piece of cast is kept tightly closed. If your cast gets dirty, use a mild detergent and a damp washcloth to wipe off the outside of your cast. Continue to cover your cast with trash bags to keep it dry while you bathe.       •Care for the edges of your cast. Cover the cast edges to keep them smooth. Use 4 inch pieces of waterproof tape. Place one end of the tape under the inside edge of your cast and fold it over to the outside surface. Overlap tape strips until the edges are completely covered. Change the tape as directed. Do not pull or repair any of the padding from inside the cast. This could cause blisters and sores on the skin under your cast.       •Keep weight off your cast. Do not let anyone push down or lean on your cast. This may cause it to break.      •Do not use sharp objects. Do not use a sharp or pointed object to scratch under your cast. This may cause wounds that can get infected, or you may lose the item inside the cast. If your skin itches, blow cool air under the cast. You may also gently scratch your skin outside the cast with a cloth.          © Copyright [employer] 2021           back to top                          © Copyright [employer] 2021

## 2021-03-18 NOTE — ED PROVIDER NOTE - PATIENT PORTAL LINK FT
You can access the FollowMyHealth Patient Portal offered by [facility] by registering at the following website: [email or web address]. By joining [employer]’s FollowMyHealth portal, you will also be able to view your health information using other applications (apps) compatible with our system.

## 2021-03-18 NOTE — ED PROVIDER NOTE - PHYSICAL EXAMINATION
cast on right hand/wrist.  right hand: normal gross sensation, no swelling of fingers, no cyanosis, normal gross sensation, normal ROM of joints not immobilized by cast.

## 2021-03-18 NOTE — ED PROVIDER NOTE - PROGRESS NOTE DETAILS
feels well. spoke with person working at facility that the patient lives it- states that pt has to be picked up by someone at the facility and then pt cannot leave by cab or ambulette because he often elopes from them. states that someone will be available to come pick him up around 630am. slept in ED overnight. spoke with facility- will  patient at 7am. rx sent for pain meds. f/u ortho/hand. return precautions discussed. Patient signed out to me by Dr. Gomez. Awaiting  from facility. Patient is resting comfortably, NAD. Called by patient's counsellor Juliet. He is waiting for a van to come get him so he can come to hospital to get patient. 1-1.5 hours. Patient standing next to bed, talking about leaving, but is redirectable. Called by patient's counsellor Juliet. He is waiting for a van to come get him so he can come to hospital to get patient. 1-1.5 hours. Patient standing next to bed, talking about leaving, but is redirectable. 1:1 ordered. Offered breakfast but patient declined.

## 2021-03-18 NOTE — ED PROVIDER NOTE - OBJECTIVE STATEMENT
30 year old male PMH asthma, factitious disorder, GERD, hyperthyroid, MR coming in with right thumb pain. pt states broke this thumb recently by punching a wall and had a cast placed this morning at [facility] but states they didn't send him home with any pain medication and he is still having pain in his thumb. states no swelling, no tingling, no numbness, no color change. also states earlier today had mild abd pain and 5 episodes of nonbloody diarrhea after eating chinese food but states no abdominal pain at this time.

## 2021-03-19 VITALS
TEMPERATURE: 98 F | RESPIRATION RATE: 18 BRPM | SYSTOLIC BLOOD PRESSURE: 123 MMHG | DIASTOLIC BLOOD PRESSURE: 89 MMHG | HEART RATE: 78 BPM | OXYGEN SATURATION: 96 %

## 2021-03-19 PROCEDURE — 99285 EMERGENCY DEPT VISIT HI MDM: CPT

## 2021-03-19 RX ORDER — LEVOTHYROXINE SODIUM 125 MCG
50 TABLET ORAL ONCE
Refills: 0 | Status: COMPLETED | OUTPATIENT
Start: 2021-03-19 | End: 2021-03-19

## 2021-03-19 RX ORDER — IBUPROFEN 200 MG
600 TABLET ORAL ONCE
Refills: 0 | Status: COMPLETED | OUTPATIENT
Start: 2021-03-19 | End: 2021-03-19

## 2021-03-19 RX ORDER — IBUPROFEN 200 MG
1 TABLET ORAL
Qty: 28 | Refills: 0
Start: 2021-03-19 | End: 2021-03-25

## 2021-03-19 RX ORDER — PANTOPRAZOLE SODIUM 20 MG/1
40 TABLET, DELAYED RELEASE ORAL ONCE
Refills: 0 | Status: COMPLETED | OUTPATIENT
Start: 2021-03-19 | End: 2021-03-19

## 2021-03-19 RX ORDER — VALPROIC ACID (AS SODIUM SALT) 250 MG/5ML
500 SOLUTION, ORAL ORAL ONCE
Refills: 0 | Status: COMPLETED | OUTPATIENT
Start: 2021-03-19 | End: 2021-03-19

## 2021-03-19 RX ORDER — ACETAMINOPHEN 500 MG
650 TABLET ORAL ONCE
Refills: 0 | Status: COMPLETED | OUTPATIENT
Start: 2021-03-19 | End: 2021-03-19

## 2021-03-19 RX ORDER — ACETAMINOPHEN 500 MG
2 TABLET ORAL
Qty: 84 | Refills: 0
Start: 2021-03-19 | End: 2021-03-25

## 2021-03-19 RX ADMIN — Medication 600 MILLIGRAM(S): at 06:52

## 2021-03-19 RX ADMIN — Medication 50 MICROGRAM(S): at 07:00

## 2021-03-19 RX ADMIN — Medication 500 MILLIGRAM(S): at 07:00

## 2021-03-19 RX ADMIN — Medication 650 MILLIGRAM(S): at 09:38

## 2021-03-19 RX ADMIN — Medication 600 MILLIGRAM(S): at 09:37

## 2021-03-19 RX ADMIN — PANTOPRAZOLE SODIUM 40 MILLIGRAM(S): 20 TABLET, DELAYED RELEASE ORAL at 07:00

## 2021-03-19 RX ADMIN — Medication 650 MILLIGRAM(S): at 06:52

## 2021-03-19 NOTE — ED ADULT NURSE NOTE - CHIEF COMPLAINT QUOTE
Pt BIBA for compliant of pain to right hand s/p was treated in [facility] for right broken hand has a cast on compliant of pain to his right hand and abd pain with diarrhea.

## 2021-03-20 ENCOUNTER — EMERGENCY (EMERGENCY)
Facility: HOSPITAL | Age: 31
LOS: 1 days | Discharge: HOME | End: 2021-03-20
Attending: EMERGENCY MEDICINE
Payer: MEDICAID

## 2021-03-20 VITALS
TEMPERATURE: 98 F | OXYGEN SATURATION: 96 % | HEIGHT: 71 IN | RESPIRATION RATE: 18 BRPM | WEIGHT: 250 LBS | DIASTOLIC BLOOD PRESSURE: 81 MMHG | HEART RATE: 105 BPM | SYSTOLIC BLOOD PRESSURE: 157 MMHG

## 2021-03-20 PROCEDURE — 93005 ELECTROCARDIOGRAM TRACING: CPT

## 2021-03-20 PROCEDURE — 99284 EMERGENCY DEPT VISIT MOD MDM: CPT

## 2021-03-20 PROCEDURE — 71046 X-RAY EXAM CHEST 2 VIEWS: CPT | Mod: 26

## 2021-03-20 PROCEDURE — 96372 THER/PROPH/DIAG INJ SC/IM: CPT

## 2021-03-20 PROCEDURE — 99284 EMERGENCY DEPT VISIT MOD MDM: CPT | Mod: 25

## 2021-03-20 PROCEDURE — 71046 X-RAY EXAM CHEST 2 VIEWS: CPT

## 2021-03-20 PROCEDURE — 93010 ELECTROCARDIOGRAM REPORT: CPT

## 2021-03-20 RX ORDER — KETOROLAC TROMETHAMINE 30 MG/ML
30 SYRINGE (ML) INJECTION ONCE
Refills: 0 | Status: DISCONTINUED | OUTPATIENT
Start: 2021-03-20 | End: 2021-03-20

## 2021-03-20 RX ADMIN — Medication 30 MILLIGRAM(S): at 23:57

## 2021-03-20 RX ADMIN — Medication 1 MILLIGRAM(S): at 21:52

## 2021-03-20 NOTE — ED PROVIDER NOTE - CARE PLAN
Patient applied dressing to wound okayed by PA. IV antibiotic started.  
Patient denies further needs or concerns after reviewing discharge instructions.  
Patient states that she cut the pad of her finger off while using a slicer. Patient reports that her finger appears to be infected and came in for reevaluation  
Principal Discharge DX:	Chest pain

## 2021-03-20 NOTE — ED ADULT NURSE NOTE - NSIMPLEMENTINTERV_GEN_ALL_ED
Implemented All Universal Safety Interventions:  [place] to call system. Call bell, personal items and telephone within reach. Instruct patient to call for assistance. Room bathroom lighting operational. Non-slip footwear when patient is off stretcher. Physically safe environment: no spills, clutter or unnecessary equipment. Stretcher in lowest position, wheels locked, appropriate side rails in place.

## 2021-03-20 NOTE — ED PROVIDER NOTE - PATIENT PORTAL LINK FT
You can access the FollowMyHealth Patient Portal offered by [facility] by registering at the following website: [email or web address]. By joining [employer]’s FollowMyHealth portal, you will also be able to view your health information using other applications (apps) compatible with our system.

## 2021-04-28 ENCOUNTER — EMERGENCY (EMERGENCY)
Facility: HOSPITAL | Age: 31
LOS: 1 days | Discharge: HOME | End: 2021-04-28
Attending: EMERGENCY MEDICINE
Payer: MEDICAID

## 2021-04-28 VITALS
OXYGEN SATURATION: 98 % | DIASTOLIC BLOOD PRESSURE: 77 MMHG | RESPIRATION RATE: 18 BRPM | TEMPERATURE: 98 F | SYSTOLIC BLOOD PRESSURE: 109 MMHG | HEIGHT: 71 IN | HEART RATE: 67 BPM

## 2021-04-28 PROCEDURE — 99283 EMERGENCY DEPT VISIT LOW MDM: CPT

## 2021-04-28 RX ORDER — TAMSULOSIN HYDROCHLORIDE 0.4 MG/1
0.4 CAPSULE ORAL ONCE
Refills: 0 | Status: COMPLETED | OUTPATIENT
Start: 2021-04-28 | End: 2021-04-28

## 2021-04-28 RX ORDER — FINASTERIDE 5 MG/1
5 TABLET, FILM COATED ORAL ONCE
Refills: 0 | Status: COMPLETED | OUTPATIENT
Start: 2021-04-28 | End: 2021-04-28

## 2021-04-28 RX ORDER — SODIUM CHLORIDE 9 MG/ML
2000 INJECTION INTRAMUSCULAR; INTRAVENOUS; SUBCUTANEOUS ONCE
Refills: 0 | Status: DISCONTINUED | OUTPATIENT
Start: 2021-04-28 | End: 2021-04-28

## 2021-04-28 RX ADMIN — TAMSULOSIN HYDROCHLORIDE 0.4 MILLIGRAM(S): 0.4 CAPSULE ORAL at 22:52

## 2021-04-28 RX ADMIN — FINASTERIDE 5 MILLIGRAM(S): 5 TABLET, FILM COATED ORAL at 22:52

## 2021-04-28 NOTE — ED ADULT NURSE REASSESSMENT NOTE - NS ED NURSE REASSESS COMMENT FT1
ED evaluation completed. Pt went home with mckeon cath to leg bag. care Instruction given to Pt and HHA.Tabitha.

## 2021-04-28 NOTE — ED PROVIDER NOTE - NS ED ROS FT
CONSTITUTIONAL: no fever, no chills   EYES: no visual changes, no eye pain   ENMT: no nasal congestion, no throat pain  CARDIOVASCULAR: no chest pain, no edema, no palpitations   RESPIRATORY: no shortness of breath, no cough   GASTROINTESTINAL: no abdominal pain, no nausea, no vomiting, no diarrhea, no constipation   GENITOURINARY: no dysuria, no frequency, +straining/retention   MUSCULOSKELETAL: no joint pains, no myalgias, no back pain   SKIN: no rashes  NEUROLOGICAL: no weakness, no headache, no dizziness, no slurred speech, no syncope   PSYCHIATRIC: no known mental health illness   HEME/LYMPH: no lymphadenopathy      All other ROS negative except as per HPI

## 2021-04-28 NOTE — ED PROVIDER NOTE - NSFOLLOWUPINSTRUCTIONS_ED_ALL_ED_FT
BENTON CATHETER PLACEMENT AND CARE - AfterCare(R) Instructions(ER/ED)           Benton Catheter Placement and Care    WHAT YOU NEED TO KNOW:    A Benton catheter is a sterile tube that is inserted into your bladder to drain urine. It is also called an indwelling urinary catheter.     DISCHARGE INSTRUCTIONS:    Return to the emergency department if:   •Your catheter comes out.       •You suddenly have material that looks like sand in the tubing or drainage bag.      •No urine is draining into the bag and you have checked the system.      •You have pain in your hip, back, pelvis, or lower abdomen.      •You are confused or cannot think clearly.      Call your doctor or urologist if:   •You have a fever.      •You have bladder spasms for more than 1 day after the catheter is placed.      •You see blood in the tubing or drainage bag.      •You have a rash or itching where the catheter tube is secured to your skin.      •Urine leaks from or around the catheter, tubing, or drainage bag.      •The closed drainage system has accidently come open or apart.       •You see a layer of crystals inside the tubing.      •You have questions or concerns about your condition or care.      Care for your catheter and drainage bag: You can reduce your risk for infection and injury by caring for your catheter and drainage bag properly.  •Wash your hands often. Wash before and after you touch your catheter, tubing, or drainage bag. Use soap and water. Wear clean disposable gloves when you care for your catheter or disconnect the drainage bag. Wash your hands before you prepare or eat food.   Handwashing           •Clean your genital area 2 times every day. Clean your catheter area and anal opening after every bowel movement. ?For men: Use a soapy cloth to clean the tip of your penis. Start where the catheter enters. Wipe backward making sure to pull back the foreskin. Then use a cloth with clear water in the same direction to clean away the soap.       ?For women: Use a soapy cloth to clean the area that the catheter enters your body. Make sure to separate your labia and wipe toward the anus. Then use a cloth with clear water and wipe in the same direction.       •Secure the catheter tube so you do not pull or move the catheter. This helps prevent pain and bladder spasms. Healthcare providers will show you how to use medical tape or a strap to secure the catheter tube to your body.       •Keep a closed drainage system. Your catheter should always be attached to the drainage bag to form a closed system. Do not disconnect any part of the closed system unless you need to change the bag.      •Keep the drainage bag below the level of your waist. This helps stop urine from moving back up the tubing and into your bladder. Do not loop or kink the tubing. This can cause urine to back up and collect in your bladder. Do not let the drainage bag touch or lie on the floor.      •Empty the drainage bag when needed. The weight of a full drainage bag can be painful. Empty the drainage bag every 3 to 6 hours or when it is ? full.       •Clean and change the drainage bag as directed. Ask your healthcare provider how often you should change the drainage bag and what cleaning solution to use. Wear disposable gloves when you change the bag. Do not allow the end of the catheter or tubing to touch anything. Clean the ends with an alcohol pad before you reconnect them.      What to do if problems develop:   •No urine is draining into the bag: ?Check for kinks in the tubing and straighten them out.       ?Check the tape or strap used to secure the catheter tube to your skin. Make sure it is not blocking the tube.       ?Make sure you are not sitting or lying on the tubing.      ?Make sure the urine bag is hanging below the level of your waist.      •Urine leaks from or around the catheter, tubing, or drainage bag: Check if the closed drainage system has accidently come open or apart. Clean the catheter and tubing ends with a new alcohol pad and reconnect them.       Follow up with your doctor or urologist as directed: Write down your questions so you remember to ask them during your visits.        © Copyright [employer] 2021           back to top                          © Copyright [employer] 2021

## 2021-04-28 NOTE — ED PROVIDER NOTE - PROGRESS NOTE DETAILS
Pt repeatedly requesting Sarabia cath. Placed by RN. Will dc. Staff states pt already on prostate meds, so assume flomax. Instructed pt and staff at bedside how to draing leg bag. Fu with urology. Discussed indications for patient return to ED. Group home staff understood. Staff states pt missed evening meds, so asking for flomax and finasteride before dc Pt repeatedly requesting Sarabia cath. Placed by RN. Will dc. Staff states pt already on prostate meds, so assume flomax. Instructed pt and staff at bedside how to drain leg bag. Fu with urology. Discussed indications for patient return to ED. Group home staff understood.

## 2021-04-28 NOTE — ED PROVIDER NOTE - PATIENT PORTAL LINK FT
You can access the FollowMyHealth Patient Portal offered by [facility] by registering at the following website: [email or web address]. By joining [employer]’s FollowMyHealth portal, you will also be able to view your health information using other applications (apps) compatible with our system.

## 2021-04-28 NOTE — ED PROVIDER NOTE - OBJECTIVE STATEMENT
31 yo M pmh of urinary retention, ?BPH, factitious disorder, presents stating he has not been able to urinate normally x a few hours. Had episode of urinary retention yesterday and went to outside ED and had straight cath placed. Spoke with manager of [facility] ([place], [phone]) who states if pt needs Sarabia cath then pt and staff can be educated regarding use and he can be discharged back to facility. Pt recently changed from depakote to depakene, but no other med changes. Also states pt frequents hospitals and has had factitious behavior in the past.

## 2021-04-28 NOTE — ED ADULT TRIAGE NOTE - CHIEF COMPLAINT QUOTE
BIBA for c/o  urinary retention seen and treated yeaterday in another ED for the same problem. pt coming from  group home accompanied by staff ( Emily Gomez)

## 2021-04-29 ENCOUNTER — EMERGENCY (EMERGENCY)
Facility: HOSPITAL | Age: 31
LOS: 1 days | Discharge: HOME | End: 2021-04-29
Attending: STUDENT IN AN ORGANIZED HEALTH CARE EDUCATION/TRAINING PROGRAM
Payer: MEDICAID

## 2021-04-29 ENCOUNTER — TRANSCRIPTION ENCOUNTER (OUTPATIENT)
Age: 31
End: 2021-04-29

## 2021-04-29 VITALS
OXYGEN SATURATION: 98 % | RESPIRATION RATE: 17 BRPM | SYSTOLIC BLOOD PRESSURE: 121 MMHG | DIASTOLIC BLOOD PRESSURE: 82 MMHG | TEMPERATURE: 98 F | HEART RATE: 67 BPM

## 2021-04-29 VITALS
HEIGHT: 71 IN | RESPIRATION RATE: 20 BRPM | DIASTOLIC BLOOD PRESSURE: 72 MMHG | SYSTOLIC BLOOD PRESSURE: 102 MMHG | OXYGEN SATURATION: 98 % | HEART RATE: 65 BPM | TEMPERATURE: 98 F | WEIGHT: 250 LBS

## 2021-04-29 DIAGNOSIS — F79 UNSPECIFIED INTELLECTUAL DISABILITIES: ICD-10-CM

## 2021-04-29 DIAGNOSIS — F43.24 ADJUSTMENT DISORDER WITH DISTURBANCE OF CONDUCT: ICD-10-CM

## 2021-04-29 LAB
ALBUMIN SERPL ELPH-MCNC: 3.3 G/DL — LOW (ref 3.5–5)
ALP SERPL-CCNC: 95 U/L — SIGNIFICANT CHANGE UP (ref 40–120)
ALT FLD-CCNC: 18 U/L DA — SIGNIFICANT CHANGE UP (ref 10–60)
ANION GAP SERPL CALC-SCNC: 3 MMOL/L — LOW (ref 5–17)
APPEARANCE UR: CLEAR — SIGNIFICANT CHANGE UP
AST SERPL-CCNC: 7 U/L — LOW (ref 10–40)
BASOPHILS # BLD AUTO: 0.02 K/UL — SIGNIFICANT CHANGE UP (ref 0–0.2)
BASOPHILS NFR BLD AUTO: 0.3 % — SIGNIFICANT CHANGE UP (ref 0–2)
BILIRUB SERPL-MCNC: 0.2 MG/DL — SIGNIFICANT CHANGE UP (ref 0.2–1.2)
BILIRUB UR-MCNC: NEGATIVE — SIGNIFICANT CHANGE UP
BUN SERPL-MCNC: 15 MG/DL — SIGNIFICANT CHANGE UP (ref 7–18)
CALCIUM SERPL-MCNC: 9 MG/DL — SIGNIFICANT CHANGE UP (ref 8.4–10.5)
CHLORIDE SERPL-SCNC: 106 MMOL/L — SIGNIFICANT CHANGE UP (ref 96–108)
CO2 SERPL-SCNC: 31 MMOL/L — SIGNIFICANT CHANGE UP (ref 22–31)
COLOR SPEC: YELLOW — SIGNIFICANT CHANGE UP
CREAT SERPL-MCNC: 0.74 MG/DL — SIGNIFICANT CHANGE UP (ref 0.5–1.3)
DIFF PNL FLD: ABNORMAL
EOSINOPHIL # BLD AUTO: 0.15 K/UL — SIGNIFICANT CHANGE UP (ref 0–0.5)
EOSINOPHIL NFR BLD AUTO: 2.4 % — SIGNIFICANT CHANGE UP (ref 0–6)
ETHANOL SERPL-MCNC: <3 MG/DL — SIGNIFICANT CHANGE UP (ref 0–10)
GLUCOSE SERPL-MCNC: 82 MG/DL — SIGNIFICANT CHANGE UP (ref 70–99)
GLUCOSE UR QL: NEGATIVE — SIGNIFICANT CHANGE UP
HCT VFR BLD CALC: 47.1 % — SIGNIFICANT CHANGE UP (ref 39–50)
HGB BLD-MCNC: 14.7 G/DL — SIGNIFICANT CHANGE UP (ref 13–17)
IMM GRANULOCYTES NFR BLD AUTO: 0.5 % — SIGNIFICANT CHANGE UP (ref 0–1.5)
KETONES UR-MCNC: NEGATIVE — SIGNIFICANT CHANGE UP
LEUKOCYTE ESTERASE UR-ACNC: NEGATIVE — SIGNIFICANT CHANGE UP
LYMPHOCYTES # BLD AUTO: 2.33 K/UL — SIGNIFICANT CHANGE UP (ref 1–3.3)
LYMPHOCYTES # BLD AUTO: 37.3 % — SIGNIFICANT CHANGE UP (ref 13–44)
MCHC RBC-ENTMCNC: 25 PG — LOW (ref 27–34)
MCHC RBC-ENTMCNC: 31.2 GM/DL — LOW (ref 32–36)
MCV RBC AUTO: 80.2 FL — SIGNIFICANT CHANGE UP (ref 80–100)
MONOCYTES # BLD AUTO: 0.53 K/UL — SIGNIFICANT CHANGE UP (ref 0–0.9)
MONOCYTES NFR BLD AUTO: 8.5 % — SIGNIFICANT CHANGE UP (ref 2–14)
NEUTROPHILS # BLD AUTO: 3.19 K/UL — SIGNIFICANT CHANGE UP (ref 1.8–7.4)
NEUTROPHILS NFR BLD AUTO: 51 % — SIGNIFICANT CHANGE UP (ref 43–77)
NITRITE UR-MCNC: NEGATIVE — SIGNIFICANT CHANGE UP
NRBC # BLD: 0 /100 WBCS — SIGNIFICANT CHANGE UP (ref 0–0)
PCP SPEC-MCNC: SIGNIFICANT CHANGE UP
PH UR: 6.5 — SIGNIFICANT CHANGE UP (ref 5–8)
PLATELET # BLD AUTO: 279 K/UL — SIGNIFICANT CHANGE UP (ref 150–400)
POTASSIUM SERPL-MCNC: 4.6 MMOL/L — SIGNIFICANT CHANGE UP (ref 3.5–5.3)
POTASSIUM SERPL-SCNC: 4.6 MMOL/L — SIGNIFICANT CHANGE UP (ref 3.5–5.3)
PROT SERPL-MCNC: 6.8 G/DL — SIGNIFICANT CHANGE UP (ref 6–8.3)
PROT UR-MCNC: NEGATIVE — SIGNIFICANT CHANGE UP
RBC # BLD: 5.87 M/UL — HIGH (ref 4.2–5.8)
RBC # FLD: 13.9 % — SIGNIFICANT CHANGE UP (ref 10.3–14.5)
SODIUM SERPL-SCNC: 140 MMOL/L — SIGNIFICANT CHANGE UP (ref 135–145)
SP GR SPEC: 1.01 — SIGNIFICANT CHANGE UP (ref 1.01–1.02)
UROBILINOGEN FLD QL: NEGATIVE — SIGNIFICANT CHANGE UP
WBC # BLD: 6.25 K/UL — SIGNIFICANT CHANGE UP (ref 3.8–10.5)
WBC # FLD AUTO: 6.25 K/UL — SIGNIFICANT CHANGE UP (ref 3.8–10.5)

## 2021-04-29 PROCEDURE — 93005 ELECTROCARDIOGRAM TRACING: CPT

## 2021-04-29 PROCEDURE — 81001 URINALYSIS AUTO W/SCOPE: CPT

## 2021-04-29 PROCEDURE — 90792 PSYCH DIAG EVAL W/MED SRVCS: CPT | Mod: 95

## 2021-04-29 PROCEDURE — 99284 EMERGENCY DEPT VISIT MOD MDM: CPT

## 2021-04-29 PROCEDURE — 80053 COMPREHEN METABOLIC PANEL: CPT

## 2021-04-29 PROCEDURE — 85025 COMPLETE CBC W/AUTO DIFF WBC: CPT

## 2021-04-29 PROCEDURE — 80307 DRUG TEST PRSMV CHEM ANLYZR: CPT

## 2021-04-29 PROCEDURE — 36415 COLL VENOUS BLD VENIPUNCTURE: CPT

## 2021-04-29 PROCEDURE — 99285 EMERGENCY DEPT VISIT HI MDM: CPT

## 2021-04-29 PROCEDURE — 87086 URINE CULTURE/COLONY COUNT: CPT

## 2021-04-29 RX ORDER — CEFUROXIME AXETIL 250 MG
1 TABLET ORAL
Qty: 14 | Refills: 0
Start: 2021-04-29 | End: 2021-05-05

## 2021-04-29 RX ORDER — ACETAMINOPHEN 500 MG
650 TABLET ORAL ONCE
Refills: 0 | Status: COMPLETED | OUTPATIENT
Start: 2021-04-29 | End: 2021-04-29

## 2021-04-29 RX ADMIN — Medication 650 MILLIGRAM(S): at 03:00

## 2021-04-29 RX ADMIN — Medication 650 MILLIGRAM(S): at 02:30

## 2021-04-29 NOTE — ED ADULT NURSE NOTE - OBJECTIVE STATEMENT
pt BIBA with complaints of pain from mckeon catheter. pt recently seen in ED for urinary retention. Mckeon draining well upon assessment. Denies fever, N/V, and back pains.

## 2021-04-29 NOTE — ED PROVIDER NOTE - NSFOLLOWUPCLINICS_GEN_ALL_ED_FT
[facility]  Urology  [address] [place] [address]  Phone: [phone]  Fax: [phone]  Follow Up Time: 4-6 Days

## 2021-04-29 NOTE — ED PROVIDER NOTE - NSFOLLOWUPINSTRUCTIONS_ED_ALL_ED_FT
You were seen in the emergency room today for concern about the mckeon catheter. It appears to be functioning properly. Please follow up with the urology doctors within 5 days to determine if the catheter can be safely removed.    Please call your primary doctor to inform them of this ER visit and obtain the next available appointment within the next 5 days. As we discussed, return to the ER if you have any worsening symptoms.    We no longer feel that you need further emergency care or admission to the hospital at this time.    While we have determined that you are currently stable for discharge, we know that things can change. Please seek immediate medical attention or return to the ER if you experience any of the following:  Any worsening or persistent symptoms  Severe Pain  Chest Pain  Difficulty Breathing  Bleeding  Passing Out  Severe Rash  Inability to Eat or Drink  Persistent Fever    Please see a primary care doctor or specialist within 5 days to ensure that you are improving.    Please call the [facility] phone numbers on this document if you have any problems obtaining a follow up appointment.    I wish you well! -Dr Georges

## 2021-04-29 NOTE — ED ADULT TRIAGE NOTE - CHIEF COMPLAINT QUOTE
BIBA for c/o urinary  retention    seen in Ed for the same  problem earlier  came back for   the same problem.

## 2021-04-29 NOTE — ED PROVIDER NOTE - CLINICAL SUMMARY MEDICAL DECISION MAKING FREE TEXT BOX
Pt p/w pain from mckeon catheter. On exam no reason to suspect a problem with the mckeon and replacement would only risk introducing infection. Giving tylenol. No reason to suspect malfunction of mckeon. Will obtain urine culture to evaluate for infectious causes of obstruction/pain. Discussed with pt my clinical impression and results, patient given strict return precautions if persistent or worsening of symptoms occurs, and need for close follow up. Pt expressed understanding and agrees with plan. Pt is well appearing with a reassuring exam. Discharge home with PMD or Specialist f/u within 5 days.

## 2021-04-29 NOTE — ED BEHAVIORAL HEALTH ASSESSMENT NOTE - HPI (INCLUDE ILLNESS QUALITY, SEVERITY, DURATION, TIMING, CONTEXT, MODIFYING FACTORS, ASSOCIATED SIGNS AND SYMPTOMS)
29yo M, domiciled at [facility] group [place], PPH of intellectual disability, mood disorder, factitious disorder, past psych admissions, no known SA, hx of self injury (nonsuicidal), hx of aggression, no legal issues, BIBEMS for urinary retention, psych consulted as patient started endorsing SI in the context of discharge. Previously seen for assessment by psych in Feb 2021.     On interview, patient denies feeling depressed or anhedonic. He denies any current thoughts of self harm or SI. States that he made the comment in order to have someone speak to him "I was mad; I'm better now" as he did not have his jacket and shoes. Insists that we call the group home and that they bring him his jacket and shoes before discharge. Denies any AH or VH or paranoia. Denies any thoughts of harming others or HI. Afterwards asks insistently if he can leave because he wanta to play video games and use rocking chair. Throughout interview there were no mood or psychotic symptoms. Presented linear, engaged, asking for needs and future oriented.

## 2021-04-29 NOTE — ED BEHAVIORAL HEALTH ASSESSMENT NOTE - DESCRIPTION
see hpi As per  collateral note.    COVID Screen:  Denies COVID positive contact in the last 10 days  Denies travel out of state in the last 10 days  Denies COVID test in the last 90 days  Denies antibody testing  Denies COVID vaccine lives in [facility]

## 2021-04-29 NOTE — ED PROVIDER NOTE - CARE PLAN
Principal Discharge DX:	Penis pain   Principal Discharge DX:	Penis pain  Secondary Diagnosis:	Mood disorder

## 2021-04-29 NOTE — BH SAFETY PLAN - STEP 6 SAFE ENVIRONMENT
Discussed with group home importance of removing sharps and pills in environment. Also to continue 1:1 at residence.

## 2021-04-29 NOTE — ED BEHAVIORAL HEALTH ASSESSMENT NOTE - SUMMARY
29yo M, domiciled at [facility], PPH of intellectual disability, mood disorder, factitious disorder, past psych admissions, no known SA, hx of self injury (nonsuicidal), hx of aggression, no legal issues, BIBEMS for urinary retention, psych consulted as patient started endorsing SI in the context of discharge. Previously seen for assessment by psych in Feb 2021.     Patient currently does not present with any acute mood or psychotic disorder. Does not present with any thoughts of self harm, SI, harm towards others, HI. Statement of SI appears to have been reactive out of feeling frustrated for not having his shoes and jacket before discharge. He denies having any plan or intent at the time and feels back to his baseline-- asks to go home so that he can play video games and use rocking chair. Currently there is no acute safety risk. Currently does not meet criteria for involuntary psychiatric hospitalization at this time. Will be treat and release.

## 2021-04-29 NOTE — ED BEHAVIORAL HEALTH ASSESSMENT NOTE - SAFETY PLAN ADDT'L DETAILS
Safety plan discussed with.../Education provided regarding environmental safety / lethal means restriction/Provision of National Suicide Prevention Lifeline 3-096-483-FXOO (4008)

## 2021-04-29 NOTE — ED PROVIDER NOTE - NS ED ROS FT
Patient Reports No  Fever/Chills  Nausea/Vomiting/Diarrhea  Chest Pain, Shortness of Breath  Syncope, Focal Numbness or Weakness  Other Recent Illness or Hospitalizations

## 2021-04-29 NOTE — ED BEHAVIORAL HEALTH ASSESSMENT NOTE - DIFFERENTIAL
Primary Dx Adjustment disorder with disturbance of conduct F43.24. Secondary Dx 1 Intellectual disability F79.

## 2021-04-29 NOTE — ED BEHAVIORAL HEALTH ASSESSMENT NOTE - CURRENT MEDICATION
· 	Tylenol 325 mg oral tablet: 2 tab(s) orally every 4 hours   · 	ibuprofen 600 mg oral tablet: 1 tab(s) orally every 6 hours   · 	tamsulosin 0.4 mg oral capsule: 1 cap(s) orally once a day (at bedtime)  · 	finasteride 5 mg oral tablet: 1 tab(s) orally once a day  · 	amantadine 100 mg oral tablet: 1 tab(s) orally 2 times a day  · 	baclofen 10 mg oral tablet: 1 tab(s) orally 3 times a day  · 	Dulera 100 mcg-5 mcg/inh inhalation aerosol: 2 puff(s) inhaled 2 times a day  · 	FLUoxetine 10 mg oral capsule: 1 cap(s) orally once a day  · 	FLUoxetine 20 mg oral capsule: 1 cap(s) orally once a day  · 	gabapentin 800 mg oral tablet: 1 tab(s) orally 3 times a day  · 	levothyroxine 50 mcg (0.05 mg) oral tablet: 1 tab(s) orally once a day  · 	pantoprazole 40 mg oral delayed release tablet: 1 tab(s) orally once a day  · 	polyethylene glycol 3350 oral powder for reconstitution: 1 each orally once a day  · 	simvastatin 5 mg oral tablet: 1 tab(s) orally once a day (at bedtime)  · 	Vitamin D2 50,000 intl units (1.25 mg) oral capsule: 1 cap(s) orally once a week  · 	LORazepam 1 mg oral tablet: 1 tab(s) orally 2 times a day  · 	Cogentin 1 mg oral tablet: 1 tab(s) orally 2 times a day  · 	cloNIDine 0.1 mg oral tablet: 1 tab(s) orally once a day (at bedtime)  · 	OXcarbazepine 300 mg oral tablet: 1 tab(s) orally 2 times a day  · 	lithium carbonate: 150 milligram(s) orally 2 times a day  · 	Depakote 500 mg oral delayed release tablet: 1 tab(s) orally 2 times a day  · 	perphenazine 4 mg oral tablet: 1 tab(s) orally 3 times a day

## 2021-04-29 NOTE — ED PROVIDER NOTE - PATIENT PORTAL LINK FT
You can access the FollowMyHealth Patient Portal offered by [facility] by registering at the following website: [email or web address]. By joining [employer]’s FollowMyHealth portal, you will also be able to view your health information using other applications (apps) compatible with our system. You can access the FollowMyHealth Patient Portal offered by [facility] by registering at the following website: [email or web address]. By joining [employer]’s FollowMyHealth portal, you will also be able to view your health information using other applications (apps) compatible with our system.

## 2021-04-29 NOTE — ED PROVIDER NOTE - PHYSICAL EXAMINATION
Vital Signs Reviewed  GEN: Comfortable, NAD, AAOx3  HEENT: NCAT, MMM, Neck Supple  RESP: CTAB, No rales/rhonchi/wheezing  CV: RRR, S1S2, No murmurs  ABD: No TTP, Soft, ND, No masses, No CVA Tenderness  Extrem/Skin: Equal pulses bilat, No cyanosis/edema/rashes  : Catheter with clear straw colored urine flowing into bag, No lesions, No pus/discoloration of urine

## 2021-04-29 NOTE — ED PROVIDER NOTE - OBJECTIVE STATEMENT
31 yo M h/o BPH, psych p/w pain from mckeon catheter. Pt was seen several hours ago in the ED for acute urinary retention likely 2/2 BPH with no infectious symptoms. Mckeon placed at that time. Pt states that the catheter has been irritating since placement. Pt denies obstructed flow, abnormal urine appearance, recent fever or infectious symptoms/ N/V/ diarrhea, chest pain, SOB, focal numbness/weakness, syncope, other recent illness or hospitalizations.

## 2021-04-29 NOTE — ED ADULT NURSE NOTE - NSFALLRSKOUTCOME_ED_ALL_ED
Universal Safety Interventions I will START or STAY ON the medications listed below when I get home from the hospital:    ibuprofen 400 mg oral tablet  -- 1 tab(s) by mouth every 6 hours, As needed, pain  -- Indication: For Pain    aspirin 81 mg oral tablet, chewable  -- 2 tab(s) by mouth once a day  -- Indication: For Portal vein thrombosis    aluminum hydroxide-magnesium hydroxide 200 mg-200 mg/5 mL oral suspension  -- 30 milliliter(s) by mouth every 4 hours, As needed, Dyspepsia  -- Indication: For Dyspepsia    Lyrica 150 mg oral capsule  -- 1 cap(s) by mouth 3 times a day  -- Indication: For Pain    fluvoxaMINE 50 mg oral tablet  -- 1 tab(s) by mouth 2 times a day  -- Indication: For mood dz    traZODone 50 mg oral tablet  -- 1 tab(s) by mouth once a day (at bedtime)  -- Indication: For mood dz    metFORMIN 1000 mg oral tablet  -- 1 tab(s) by mouth 2 times a day  -- Indication: For Dm    Levemir 100 units/mL subcutaneous solution  -- 40 unit(s) subcutaneous once a day (at bedtime)  -- Indication: For Dm    dicyclomine 10 mg oral capsule  -- 1 cap(s) by mouth 3 times a day (before meals)  -- Indication: For Pain    docusate sodium 100 mg oral capsule  -- 1 cap(s) by mouth 3 times a day  -- Indication: For Constipation    senna oral tablet  -- 2 tab(s) by mouth once a day (at bedtime)  -- Indication: For Constaipation    PriLOSEC 40 mg oral delayed release capsule  -- 1 cap(s) by mouth once a day  -- Indication: For gerd    Multiple Vitamins oral tablet  -- 1 tab(s) by mouth once a day  -- Indication: For Supplement    folic acid 1 mg oral tablet  -- 1 tab(s) by mouth once a day  -- Indication: For Supplement    thiamine 100 mg oral tablet  -- 1 tab(s) by mouth once a day  -- Indication: For Supplement

## 2021-04-29 NOTE — ED BEHAVIORAL HEALTH NOTE - BEHAVIORAL HEALTH NOTE
===================  PRE-HOSPITAL COURSE  ===================  SOURCE:  ANALI Whitfield and secondhand ED documentation.  DETAILS:  Patient was BIB EMS due to requiring assistance with a urinary catheter placed recently, then upon d/c expressed SI with a plan to self-harm.    ============  ED COURSE   ============  SOURCE: ANALI Whitfield and secondhand ED documentation.  ARRIVAL:  Patient was BIB EMS, no issues upon arrival, patient was cooperative and compliant with triage process.   BELONGINGS:  RN stated patient arrived with clothing, which was provided to hospital security. Patient is currently in a hospital gown with a 1:1 staff member.   BEHAVIOR: RN stated patient’s original reason for coming into the ED was due to needing assistance with emptying his urinary catheter. RN stated that patient was ready for discharge then expressed SI with a plan to self-harm upon discharge. RN stated patient is currently calm and cooperative, AAOx3, with linear thought process, is currently sleeping in the ED, remains in good behavioral and impulse control. RN stated patient is expressing SI with a plan to self-harm, and presented a superficial abrasion on his left arm which patient claimed he scratched on himself. RN denied patient is making active SA/self-injurious behaviors in the ED. RN denied patient is reporting HI/A/VH. RN stated patient appears to maintain decent self-care with good ADLs, and is fully ambulatory. RN stated that the patient is not currently violent/aggressive.   TREATMENT:  None  VISITORS:  None    ========================  FOR EACH COLLATERAL  ========================  NAME: Lissy   NUMBER: [phone]  RELATIONSHIP: Healthcare Specialist at [facility]  RELIABILITY: Ms. Yuan was able to provide information on baseline information, however was only able to provide limited information regarding   COMMENTS: [facility] spoke with Ms. Yuan, healthcare specialist at [facility], the group home in which the patient currently resides. Ms. Yuan described patient at baseline to have limited thought process and often presents with difficulty regulating his emotions as he often quickly becomes upset and/or anxious. Ms. Yuan stated that the patient chronically expresses SI within the context of need for instant gratification, and at times will try to hurt himself in the group home with readily available objects. Ms. Yuan explained that due to the level of risk of the patient, patient is currently on a 1:1 24/7 status meaning that he is always monitored to reduce the risk of self-harm, and confirmed that the patient has no access to dangerous items such as knives, weapons, or other potentially dangerous items in the group home. Ms. Yuan stated that the patient receives psychiatry services, community habilitation, and has attempted outpatient therapy, though patient is only partially compliant as patient at times refuses to engage in these services. Ms. Yuan stated patient is currently prescribed Clonidine 0.1MG 2 tabs QD, Seroquel 50MG PRN, Risperdal Consta IM 60MG bi-weekly, Depakine 200MG per 5ML BID. Ms. Yuan stated patient is partially compliant with medications. Ms. Yuan confirmed that they currently do not have safety concerns of the patient coming back to the group home. [facility] counseled Ms. Yuan to activate EMS again in the future in the event patient presents with further psychiatric emergency in the future.    COVID Exposure Screen- collateral (i.e. third-party, chart review, belongings, etc; include EMS and ED staff)  1.	*Has the patient had a COVID-19 test in the last 90 days?  (  ) Yes   ( x ) No   (  ) Unknown- Reason: _____  IF YES PROCEED TO QUESTION #2. IF NO OR UNKNOWN, PLEASE SKIP TO QUESTION #3.  2.	Date of test(s) and result(s): ________  3.	*Has the patient tested positive for COVID-19 antibodies? (  ) Yes   (x  ) No   (  ) Unknown- Reason: _____  IF YES PROCEED TO QUESTION #4. IF NO or UNKNOWN, PLEASE SKIP TO QUESTION #5.  4.	Date of positive antibody test: ________  5.	*Has the patient received 2 doses of the COVID-19 vaccine? (  ) Yes   (  x) No   (  ) Unknown- Reason: _____  IF YES PROCEED TO QUESTION #6. IF NO or UNKNOWN, PLEASE SKIP TO QUESTION #7.  6.	 Date of second dose: ________  7.	*In the past 10 days, has the patient been around anyone with a positive COVID-19 test?* (  ) Yes   ( x ) No   (  ) Unknown- Reason: __  IF YES PROCEED TO QUESTION #8. IF NO or UNKNOWN, PLEASE SKIP TO QUESTION #13.  8.	Was the patient within 6 feet of them for at least 15 minutes? (  ) Yes   (  ) No   (  ) Unknown- Reason: _____  9.	Did the patient provide care for them? (  ) Yes   (  ) No   (  ) Unknown- Reason: ______  10.	Did the patient have direct physical contact with them (touched, hugged, or kissed them)? (  ) Yes   (  ) No    (  ) Unknown- Reason: __  11.	Did the patient share eating or drinking utensils with them? (  ) Yes   (  ) No    (  ) Unknown- Reason: ____  12.	Did they sneeze, cough, or somehow get respiratory droplets on the patient? (  ) Yes   (  ) No    (  ) Unknown- Reason: ______  13.	*Has the patient been out of [place] within the past 10 days?* (  ) Yes   ( x ) No   (  ) Unknown- Reason: _____  IF YES PLEASE ANSWER THE FOLLOWING QUESTIONS:  14.	Which state/country did they go to? ______  15.	Were they there over 24 hours? (  ) Yes   (  ) No    (  ) Unknown- Reason: ______  16.	Date of return to [facility]: ______

## 2021-04-29 NOTE — ED PROVIDER NOTE - PROGRESS NOTE DETAILS
psychiatrically cleared, stable for discharge. ua with wbc and rbc. will treat with abx Patient reports feeling suicidal and request psych evaluation. states he will stab himself with a knife. placed on 1:1 labs drawn,

## 2021-04-30 LAB
CULTURE RESULTS: NO GROWTH — SIGNIFICANT CHANGE UP
CULTURE RESULTS: SIGNIFICANT CHANGE UP
SPECIMEN SOURCE: SIGNIFICANT CHANGE UP
SPECIMEN SOURCE: SIGNIFICANT CHANGE UP

## 2021-05-06 NOTE — ED ADULT NURSE NOTE - MODE OF DISCHARGE
Occupational Therapy  Visit Type: treatment  Precautions:  Medical precautions:  fall risk;.     5/2/21: pt admitted s/p fall with Lt femoral neck fx ->unit 50  5/3: to OR: percutaneous pinning Lt femoral neck fx; PT/OT orders received, ambulate as tolerated, WBAT LLE, no active SLR in supine. Lines:     Basic: capped IV      Lines in chart and on patient reviewed, cautions maintained throughout session. Lower Extremity:    Left:  weight bearing: as tolerated. Hearing: no hearing deficits  Vision:     Current vision: no visual deficits  Safety Measures: bed alarm and bed rails    SUBJECTIVE  Patient agreed to participate in therapy this date. I am afraid I'll fall. Prior Living Situation  Information Provided By[de-identified] patient  Type of Home: Apartment  Home Layout: Elevator  Lives With: Spouse  Transportation: Taxi  Bathroom Shower/Tub: Tub/Shower unit  Bathroom Equipment: Grab bars in shower, Shower chair  Home Equipment: Four-wheeled walker    Prior Communication/Cognition  Prior Cognition: Impaired  Prior Memory: Impaired    Prior Function  Prior ADL: Independent  Bed Mobility: Independent  Transfers: Modified Independent  Toilet Transfers: Modified Independent  Ambulation in the Home: Modified  Independent  Additional Comments: pt has dementia and inconsistent historian.  All information given above needs to be verified      Pain     Location: Left leg    At onset of session (out of 10): 3    OBJECTIVE   Level of consciousness: alert    Oriented to person and place     Affect/Behavior: calm and cooperative  Bed mobility:      Supine to sit: maximal assist  Transfers:    Assistive devices: 2-wheeled walker and 1 person    Sit to stand: minimal assist    Stand to sit: minimal assist   Bed to chair: minimal assist, type:    Functional Ambulation:    Assistance:minimal assist   Assistive device:2-wheeled walker, 1 person and gait belt    Distance (ft): 10;12    Activities of Daily Living (ADLs):  Eating: Assist: supervision    Position: upright in bed  Grooming/Oral Hygiene:     Grooming assist: modified independent    Position: chair  Toilet transfer:     Assist: minimal assist    Equipment: grab bar use  Toileting:     Assist: moderate assist    Assist needed for: clothing management up and perineal hygiene      Interventions    Training provided: ADL training, activity tolerance, balance retraining, bed mobility training, transfer training and safety training  Skilled input: verbal instruction/cues and tactile instruction/cues  Verbal Consent: Writer verbally educated and received verbal consent for hand placement, positioning of patient, and techniques to be performed today from patient for therapist position for techniques as described above and how they are pertinent to the patient's plan of care. ASSESSMENT    Impairments: cognitive, safety awareness and decreased insight into deficits  Functional Limitations: functional mobility, bathing, toileting, functional transfers and dressing  Personal Occupations Profile Affected: bathing/showering, functional mobility/transfers, toileting/toilet hygiene, lower body dressing   Pt is in room 5009. She remembers she is in a hospital and cannot recall why. She got to the EOB with max assist, stand with rw and ambulated to the toilet w/ min assist.  She feels unsteady and did not want to ambulate any further. She completed her own grooming indep, while seated. She is no longer in restraints. Discharge Recommendations:   Recommendations for Discharge: OT IL: Patient requires 24 hour nonskilled assistance to perform mobility and/or ADLs safely     PT/OT Mobility Equipment for Discharge: RW  PT/OT ADL Equipment for Discharge: AE needs tbd pending confirmation of home setup. Needs shower seat and grab bars, toilet grab bars      Therapy Diagnosis:   Decreased ability to perform self care tasks and functional transfers.   Skilled therapy is required to address these limitations in attempt to maximize the patient's independence. Progress: progressing toward goals  Pain at end of session:  0/10, location: pt denies  Clinical decision making: Moderate - Patient has several limitations (3-5), comorbidities and/or complexities, as noted in detailed assessment above, that impact their occupational profile. Resulting in several treatment options and minimal to moderate task modification consistent with moderate clinical decision making complexity. End of Session:   Location: in bed  Safety measures: alarm system in place/re-engaged, bed rails x3, call light within reach, equipment intact and lines intact  Handoff to: nurse and nurse assistant    PLAN  Suggestions for next session as indicated: OT Frequency: 3 days/week  Frequency Comments: ORTHO, 5/4, home w/24 hr assist, P2-1      A minimum of 8 minutes per session x 1 week. Interventions: functional transfer training, safety training, ADL retraining and use of adaptive equipment  Agreement to plan and goals: patient agrees with goals and treatment plan      GOALS  Short Term Goals:   SHORT TERM GOALS TO BE REVIEWED BY 5/9/21. 1) Supervision toilet transfer/toileting w/AE prn. 2) SBA tub/shower transfer w/AE prn. 3) Supervision LE dressing w/setup. 4) Pt's bathroom setup will be confirmed and DME recommendations finalized. All goals still progressing as of 5/6/21    Documented in the chart in the following areas: Assessment. Plan.       Therapy procedure time and total treatment time can be found documented on the Time Entry flowsheet Ambulatory

## 2021-05-11 ENCOUNTER — EMERGENCY (EMERGENCY)
Facility: HOSPITAL | Age: 31
LOS: 1 days | Discharge: HOME | End: 2021-05-11
Attending: STUDENT IN AN ORGANIZED HEALTH CARE EDUCATION/TRAINING PROGRAM
Payer: MEDICAID

## 2021-05-11 VITALS
DIASTOLIC BLOOD PRESSURE: 83 MMHG | HEART RATE: 95 BPM | HEIGHT: 71 IN | WEIGHT: 250 LBS | TEMPERATURE: 99 F | OXYGEN SATURATION: 95 % | SYSTOLIC BLOOD PRESSURE: 126 MMHG | RESPIRATION RATE: 18 BRPM

## 2021-05-11 LAB
ALBUMIN SERPL ELPH-MCNC: 3.3 G/DL — LOW (ref 3.5–5)
ALP SERPL-CCNC: 86 U/L — SIGNIFICANT CHANGE UP (ref 40–120)
ALT FLD-CCNC: 21 U/L DA — SIGNIFICANT CHANGE UP (ref 10–60)
ANION GAP SERPL CALC-SCNC: 3 MMOL/L — LOW (ref 5–17)
AST SERPL-CCNC: 7 U/L — LOW (ref 10–40)
BASOPHILS # BLD AUTO: 0 K/UL — SIGNIFICANT CHANGE UP (ref 0–0.2)
BASOPHILS NFR BLD AUTO: 0 % — SIGNIFICANT CHANGE UP (ref 0–2)
BILIRUB SERPL-MCNC: 0.2 MG/DL — SIGNIFICANT CHANGE UP (ref 0.2–1.2)
BUN SERPL-MCNC: 23 MG/DL — HIGH (ref 7–18)
CALCIUM SERPL-MCNC: 8.9 MG/DL — SIGNIFICANT CHANGE UP (ref 8.4–10.5)
CHLORIDE SERPL-SCNC: 105 MMOL/L — SIGNIFICANT CHANGE UP (ref 96–108)
CO2 SERPL-SCNC: 30 MMOL/L — SIGNIFICANT CHANGE UP (ref 22–31)
CREAT SERPL-MCNC: 0.95 MG/DL — SIGNIFICANT CHANGE UP (ref 0.5–1.3)
EOSINOPHIL # BLD AUTO: 0 K/UL — SIGNIFICANT CHANGE UP (ref 0–0.5)
EOSINOPHIL NFR BLD AUTO: 0 % — SIGNIFICANT CHANGE UP (ref 0–6)
GLUCOSE SERPL-MCNC: 120 MG/DL — HIGH (ref 70–99)
HCT VFR BLD CALC: 43.1 % — SIGNIFICANT CHANGE UP (ref 39–50)
HGB BLD-MCNC: 13.6 G/DL — SIGNIFICANT CHANGE UP (ref 13–17)
LYMPHOCYTES # BLD AUTO: 27 % — SIGNIFICANT CHANGE UP (ref 13–44)
LYMPHOCYTES # BLD AUTO: 3.5 K/UL — HIGH (ref 1–3.3)
MCHC RBC-ENTMCNC: 25 PG — LOW (ref 27–34)
MCHC RBC-ENTMCNC: 31.6 GM/DL — LOW (ref 32–36)
MCV RBC AUTO: 79.4 FL — LOW (ref 80–100)
MONOCYTES # BLD AUTO: 0.91 K/UL — HIGH (ref 0–0.9)
MONOCYTES NFR BLD AUTO: 7 % — SIGNIFICANT CHANGE UP (ref 2–14)
NEUTROPHILS # BLD AUTO: 6.49 K/UL — SIGNIFICANT CHANGE UP (ref 1.8–7.4)
NEUTROPHILS NFR BLD AUTO: 49 % — SIGNIFICANT CHANGE UP (ref 43–77)
PLATELET # BLD AUTO: 226 K/UL — SIGNIFICANT CHANGE UP (ref 150–400)
POTASSIUM SERPL-MCNC: 4.4 MMOL/L — SIGNIFICANT CHANGE UP (ref 3.5–5.3)
POTASSIUM SERPL-SCNC: 4.4 MMOL/L — SIGNIFICANT CHANGE UP (ref 3.5–5.3)
PROT SERPL-MCNC: 6.1 G/DL — SIGNIFICANT CHANGE UP (ref 6–8.3)
RBC # BLD: 5.43 M/UL — SIGNIFICANT CHANGE UP (ref 4.2–5.8)
RBC # FLD: 14.7 % — HIGH (ref 10.3–14.5)
SODIUM SERPL-SCNC: 138 MMOL/L — SIGNIFICANT CHANGE UP (ref 135–145)
TROPONIN I SERPL-MCNC: <0.015 NG/ML — SIGNIFICANT CHANGE UP (ref 0–0.04)
WBC # BLD: 12.98 K/UL — HIGH (ref 3.8–10.5)
WBC # FLD AUTO: 12.98 K/UL — HIGH (ref 3.8–10.5)

## 2021-05-11 PROCEDURE — 85025 COMPLETE CBC W/AUTO DIFF WBC: CPT

## 2021-05-11 PROCEDURE — 99284 EMERGENCY DEPT VISIT MOD MDM: CPT

## 2021-05-11 PROCEDURE — 73070 X-RAY EXAM OF ELBOW: CPT

## 2021-05-11 PROCEDURE — 80053 COMPREHEN METABOLIC PANEL: CPT

## 2021-05-11 PROCEDURE — 99284 EMERGENCY DEPT VISIT MOD MDM: CPT | Mod: 25

## 2021-05-11 PROCEDURE — 71045 X-RAY EXAM CHEST 1 VIEW: CPT

## 2021-05-11 PROCEDURE — 36415 COLL VENOUS BLD VENIPUNCTURE: CPT

## 2021-05-11 PROCEDURE — 73610 X-RAY EXAM OF ANKLE: CPT

## 2021-05-11 PROCEDURE — 84484 ASSAY OF TROPONIN QUANT: CPT

## 2021-05-11 PROCEDURE — 73070 X-RAY EXAM OF ELBOW: CPT | Mod: 26,RT

## 2021-05-11 PROCEDURE — 93005 ELECTROCARDIOGRAM TRACING: CPT

## 2021-05-11 PROCEDURE — 71045 X-RAY EXAM CHEST 1 VIEW: CPT | Mod: 26

## 2021-05-11 PROCEDURE — 73610 X-RAY EXAM OF ANKLE: CPT | Mod: 26,RT

## 2021-05-11 NOTE — ED PROVIDER NOTE - PATIENT PORTAL LINK FT
You can access the FollowMyHealth Patient Portal offered by [facility] by registering at the following website: [email or web address]. By joining [employer]’s FollowMyHealth portal, you will also be able to view your health information using other applications (apps) compatible with our system.

## 2021-05-11 NOTE — ED ADULT NURSE NOTE - HIV OFFER
ICU HISTORY AND PHYSICAL       Hospital Day:   ICU Day:                                                          Code:DNR-CCA  Admit Date: 11/19/2020  PCP: Sathish Grande MD                                  CC: Fatigue, confusion    HISTORY OF PRESENT ILLNESS:   Hoang Love is a 79 y.o. female with a PMHx of COPD, adrenal insufficiency, CAD, MELISSA who presented with confusion with fatigue.      She states that today she began to feel confused and fatigued. She originally went to [facility] ED where she was told to go to outpatient dialysis. During dialysis she had altered status and hypotension with a systolic in the 99O and was becoming more somnolent and sent to the [facility] ED. When I saw the patient she was awake and alert. She say she has felt tired and confused today but was alert and oriented x3. She denied any fever, chills, cough, chest pain, SOB, abdominal pain, nausea, vomitting, or diarrhea.     In the ED lab work was afebrile, normal RR, not tachycardic, and hypotensive to 77/34 and received 500 ml of fluid. Labs showed K of 5, Cr 7.6, AG 18, Mg 1.7, BUN 84, phos 8.3, glucose 97, troponin .08, WBC 12.1, HG 11.3, and ABG showed respiratory acidosis with pH 6.98, pCO2 87.5. She was started on BiPAP in the ED. CXR showed expiratory lungs with no gross evidence for acute disease. Interval history:  Patient appears to be lethargic but able to follow commands. Saturating well on BiPAP. Hyperkalemic. BP remains low.   PAST HISTORY:     Past Medical History:   Diagnosis Date    Abscess     Abscess     Anemia     Back pain     Chronic kidney disease     Clostridium difficile infection 6/7/15;3/15/15; 3/24/15    6/7 nursing home report positive; PCR    COPD (chronic obstructive pulmonary disease) ([facility])     baseline O2 is 2L / min    Dementia ([facility])     Depression     DM2 (diabetes mellitus, type 2) ([facility] [address]     ESRD (end stage renal disease) ([facility] [address]     HD on Tu/Th/ Sa    Hypertension     Nightly    budesonide-formoterol  1 puff Inhalation BID    calcitRIOL  0.25 mcg Oral Every Other Day    citalopram  10 mg Oral Nightly    donepezil  10 mg Oral Nightly    fludrocortisone  0.1 mg Oral BID    levothyroxine  100 mcg Oral Daily    loperamide  2 mg Oral TID    methylcellulose  2 g Oral BID    sodium chloride flush  10 mL Intravenous 2 times per day    heparin (porcine)  5,000 Units Subcutaneous 3 times per day    [START ON 11/21/2020] azithromycin  250 mg Intravenous Q24H    cosyntropin  250 mcg Intravenous Once    methylPREDNISolone  40 mg Intravenous Daily    ipratropium-albuterol  1 ampule Inhalation 4x daily      Continuous Infusions:   dextrose       PRN Meds:sodium chloride flush, acetaminophen **OR** acetaminophen, polyethylene glycol, promethazine **OR** ondansetron, glucose, dextrose, glucagon (rDNA), dextrose, ipratropium-albuterol    Allergies: Allergies   Allergen Reactions    Decadron [Dexamethasone]      Per patient: reaction happened over 50 years ago. Tolerates oral prednisone.  Dye [Iodides]      IV dye    Pcn [Penicillins]     Percocet [Oxycodone-Acetaminophen]     Vicodin [Hydrocodone-Acetaminophen] Nausea And Vomiting     PATIENT TAKES @ HOME WITH PHENERGAN       REVIEW OF SYSTEMS:       History obtained from chart review and the patient    Review of Systems    PHYSICAL EXAM:       Vitals: BP (!) 115/56   Pulse 56   Temp 97.9 °F (36.6 °C) (Axillary)   Resp 24   Ht 5' 1\" (1.549 m)   Wt 114 lb 10.2 oz (52 kg)   LMP  (LMP Unknown)   SpO2 97%   BMI 21.66 kg/m²     I/O:      Intake/Output Summary (Last 24 hours) at 11/20/2020 0853  Last data filed at 11/20/2020 0751  Gross per 24 hour   Intake 683.93 ml   Output 0 ml   Net 683.93 ml     No intake/output data recorded. I/O last 3 completed shifts: In: 683.9 [IV Piggyback:683.9]  Out: -     Physical Examination:     Physical Exam  Constitutional:       Appearance: She is ill-appearing.    Cardiovascular: Rate and Rhythm: Normal rate and regular rhythm. Pulses: Normal pulses. Heart sounds: Normal heart sounds. Pulmonary:      Breath sounds: Wheezing (bilateral) present. Abdominal:      General: Bowel sounds are normal.   Skin:     General: Skin is warm and dry. Capillary Refill: Capillary refill takes less than 2 seconds. Neurological:      Mental Status: She is oriented to person, place, and time. Motor: Weakness present. Access:                                 -Peripheral Access Day#:1  Vent Settings:    / / /FiO2 : 40 %    Recent Labs     11/19/20  2221 11/20/20  0509   PHART 7.039* 7.060*   UEV6RTF 71.2* 71.7*   PO2ART 165.0* 166.2*           DATA:       Labs:  CBC:   Recent Labs     11/19/20  1540 11/20/20  0209   WBC 12.1* 10.3   HGB 11.3* 11.1*   HCT 39.6 37.1   PLT 89* 93*       BMP:   Recent Labs     11/19/20  1540 11/20/20  0209 11/20/20  0540    135*  --    K 5.0 6.3* 5.7*    99  --    CO2 17* 19*  --    BUN 84* 90*  --    CREATININE 7.6* 8.0*  --    GLUCOSE 97 152*  --    PHOS 8.3*  --   --      LFT's:   Recent Labs     11/19/20  1540   AST 19   ALT 9*   BILITOT 0.4   ALKPHOS 109     Troponin:   Recent Labs     11/19/20  1540 11/20/20  0209   TROPONINI 0.08* 0.08*     BNP:No results for input(s): BNP in the last 72 hours. ABGs:   Recent Labs     11/19/20  2221 11/20/20  0509   PHART 7.039* 7.060*   FMC6ICP 71.2* 71.7*   PO2ART 165.0* 166.2*     INR: No results for input(s): INR in the last 72 hours. U/A:No results for input(s): NITRITE, COLORU, PHUR, LABCAST, WBCUA, RBCUA, MUCUS, TRICHOMONAS, YEAST, BACTERIA, CLARITYU, SPECGRAV, LEUKOCYTESUR, UROBILINOGEN, BILIRUBINUR, BLOODU, GLUCOSEU, AMORPHOUS in the last 72 hours. Invalid input(s): KETONESU    CT HEAD WO CONTRAST   Final Result      No acute intracranial abnormality or significant mass effect.       XR CHEST PORTABLE   Final Result      Expiratory lungs with no gross evidence for acute cardiopulmonary disease          EKG:   Echo:  Micro:     ASSESSMENT AND PLAN:   Lynn Valero is a 79 y.o. female with a PMHx of COPD, adrenal insufficiency, CAD, MELISSA who presented with confusion with fatigue.      Acute Hypercapnic Respiratory Failure  Patient somnolent on presentation with respiratory acidosis. Patient was started on BiPAP in the ED. When I saw her she was fatigued but alert and oriented. History of COPD with wheezing on exam, likely due to COPD exacerbation. Given dose of Cefepime and vancomycin in the ED. Low suspicion for pneumonia without fever, tachycardia, and clean CXR but patient does have leukocytosis. Procalcitonin 0.57.  - Continue BiPAP  - Repeat blood gas at 1930  - Azythromycin (11/19-)  - DuoNebs q 6 hrs  - COVID-19 pending     Hypotension  Patient with hypotension in Dialysis and initial BP of 77/34 in the ED  Received 500 mL in the ED. BP improved with 500ml to 104/80. Could be 2/2 to adrenal insufficiency or dialysis earlier in the day. No concern for sepsis, lactate normal.   - Corticotropin test  - Continue to monitor in the intensive care unit, if does not improve consider 500 mL bolus     Adrenal Insufficiency   History of adrenal insufficiency on fludrocortisone. Possibly contributing to hypotension but not on hydrocortizone at home. - Corticotropin test  - Continue home florinef     Troponinemia  ECG with no evidence of ischemia. Likely demand complicated by ESRD. - Trend troponins     ESRD on Dialysis  No acute indication for emergent dialysis. - Nephrology consulted     DM Type 2  Not on any medications at home. - Hypoglycemia protocol     Alzheimer  - Continue Donezapil        Code Status:DNR-CCA  FEN: NPO  PPX: SQH   DISPO: ICU    This patient has been staffed and discussed with David Sifuentes MD.  -----------------------------  Candance Lather, MD, PGY-1  11/20/2020  8:53 AM       Patient was seen, examined and discussed with Dr. Mallory Higgins.  I agree with the history of present illness, past medical/surgical histories, family history, social history, medication list and allergies as listed. The review of systems is as noted above. My physical exam confirms the findings listed  Chart was reviewed including labs, CXR, EKG and medical records confirm the findings noted     Combined metabolic and respiratory acidosis due to acute on chronic respiratory acidosis and metabolic acidosis believed to be due to ESRD  Hypotension - improved. Received 2 liters of IVF  Mildly elevated troponin, chronic, suspected to be due to ESRD  Mild leukocytosis - resolved  ID no evidence of active infection at this time.       Keep off ABX  OK with BiPAP break  HD today per nephrology Previously Negative (within the last year)

## 2021-05-11 NOTE — ED ADULT NURSE NOTE - NSIMPLEMENTINTERV_GEN_ALL_ED
Implemented All Fall Risk Interventions:  [place] to call system. Call bell, personal items and telephone within reach. Instruct patient to call for assistance. Room bathroom lighting operational. Non-slip footwear when patient is off stretcher. Physically safe environment: no spills, clutter or unnecessary equipment. Stretcher in lowest position, wheels locked, appropriate side rails in place. Provide visual cue, wrist band, yellow gown, etc. Monitor gait and stability. Monitor for mental status changes and reorient to person, place, and time. Review medications for side effects contributing to fall risk. Reinforce activity limits and safety measures with patient and family.

## 2021-05-11 NOTE — ED ADULT NURSE NOTE - OBJECTIVE STATEMENT
Patient brought in by EMS for s/p fall after being fainted, bruises and abrasions noted to B/L upper extremities. Patient brought in by EMS for s/p fall after being fainted, bruises and abrasions noted to B/L upper extremities and Rt. ankle pain.

## 2021-05-11 NOTE — ED ADULT NURSE REASSESSMENT NOTE - NS ED NURSE REASSESS COMMENT FT1
Pt reporting he is covid positive. Spoke with pt caregiver who reports pt is NOT Covid+, last test was negative on 5.7.21. Caregiver reports patient is fully vaccinated at this time. Pt removed from isolation room.

## 2021-05-11 NOTE — ED ADULT TRIAGE NOTE - CHIEF COMPLAINT QUOTE
Pt BIBA pt stated he did not eat felt weak and he fainted and fell compliant of pain and swelling to right ankle and bruise to right elbow. Pt stated he tested positive for covid may 4. Seizures

## 2021-05-11 NOTE — ED PROVIDER NOTE - CLINICAL SUMMARY MEDICAL DECISION MAKING FREE TEXT BOX
Patient presenting for syncope. neuro intact. no signs of head trauma. endorses ankle and elbow pain. will obtain lab, r/o acs. xray, ed obs and reassess

## 2021-05-11 NOTE — ED PROVIDER NOTE - OBJECTIVE STATEMENT
30 y/o male with PMHx of GERD, hypothyroid presents to the ED c/o MSK pain x today. Pt notes he sustained a syncopal episode after feeling lightheaded in the setting of having not eaten anything today. Pt notes he fell onto his R elbow and R ankle, causing pain to those areas. Pt denies HA, nausea, vomiting, focal weakness/numbness, chest pain, shortness of breath, or any other complaints. Pt allergic to Zyprexa (dystonic rxn).

## 2021-05-11 NOTE — ED PROVIDER NOTE - IV ALTEPLASE ADMIN OUTSIDE HIDDEN
"Injectable Influenza Immunization Documentation    1.  Has the patient received the information for the injectable influenza vaccine? YES     2. Is the patient 6 months of age or older? YES     3. Does the patient have any of the following contraindications?         Severe allergy to eggs?  No     Severe allergic reaction to previous influenza vaccines?  No   Severe allergy to latex?  No       History of Guillain-[place] syndrome?  No     Currently have a temperature greater than 100.4F?  No        4.  Severely egg allergic patients should have flu vaccine eligibility assessed by an MD, RN, or pharmacist, and those who received flu vaccine should be observed for 15 min by an MD, RN, Pharmacist, Medical Technician, or member of clinic staff.\": NO    5. Latex-allergic patients should be given latex-free influenza vaccine No. Please reference the Vaccine latex table to determine if your clinic s product is latex-containing.       Vaccination given by Svetlana Granda LPN 11:17 AM on 1/23/2018    FLu shot give without problems.Svetlana Granda LPN 11:17 AM on 1/23/2018      " show

## 2021-05-11 NOTE — ED ADULT NURSE NOTE - CHIEF COMPLAINT QUOTE
Pt BIBA pt stated he did not eat felt weak and he fainted and fell compliant of pain and swelling to right ankle and bruise to right elbow. Pt stated he tested positive for covid may 4.

## 2021-05-11 NOTE — ED PROVIDER NOTE - PROGRESS NOTE DETAILS
patietn xray and lab wnl. no signs of distress. ambulatory. stable for dc, group home called for chaperone to pick patine up

## 2021-05-17 NOTE — ED ADULT NURSE NOTE - CHIEF COMPLAINT
What Type Of Note Output Would You Prefer (Optional)?: Bullet Format
How Severe Are Your Spot(S)?: moderate
Have Your Spot(S) Been Treated In The Past?: has not been treated
Hpi Title: Evaluation of Skin Lesions
The patient is a 28y Male complaining of aggressive behavior.

## 2021-05-22 ENCOUNTER — INPATIENT (INPATIENT)
Facility: HOSPITAL | Age: 31
LOS: 2 days | Discharge: NURSING, REHAB OR LONG-TERM CARE | DRG: 690 | End: 2021-05-25
Attending: INTERNAL MEDICINE | Admitting: INTERNAL MEDICINE
Payer: MEDICAID

## 2021-05-22 VITALS
WEIGHT: 251.33 LBS | HEIGHT: 71 IN | SYSTOLIC BLOOD PRESSURE: 129 MMHG | TEMPERATURE: 98 F | OXYGEN SATURATION: 95 % | HEART RATE: 120 BPM | DIASTOLIC BLOOD PRESSURE: 80 MMHG | RESPIRATION RATE: 16 BRPM

## 2021-05-22 PROCEDURE — 99285 EMERGENCY DEPT VISIT HI MDM: CPT

## 2021-05-23 DIAGNOSIS — R33.9 RETENTION OF URINE, UNSPECIFIED: ICD-10-CM

## 2021-05-23 DIAGNOSIS — N30.00 ACUTE CYSTITIS WITHOUT HEMATURIA: ICD-10-CM

## 2021-05-23 DIAGNOSIS — E03.9 HYPOTHYROIDISM, UNSPECIFIED: ICD-10-CM

## 2021-05-23 DIAGNOSIS — F39 UNSPECIFIED MOOD [AFFECTIVE] DISORDER: ICD-10-CM

## 2021-05-23 DIAGNOSIS — K21.9 GASTRO-ESOPHAGEAL REFLUX DISEASE WITHOUT ESOPHAGITIS: ICD-10-CM

## 2021-05-23 DIAGNOSIS — Z29.9 ENCOUNTER FOR PROPHYLACTIC MEASURES, UNSPECIFIED: ICD-10-CM

## 2021-05-23 DIAGNOSIS — E78.5 HYPERLIPIDEMIA, UNSPECIFIED: ICD-10-CM

## 2021-05-23 LAB
ANION GAP SERPL CALC-SCNC: 6 MMOL/L — SIGNIFICANT CHANGE UP (ref 5–17)
APPEARANCE UR: CLEAR — SIGNIFICANT CHANGE UP
BILIRUB UR-MCNC: NEGATIVE — SIGNIFICANT CHANGE UP
BUN SERPL-MCNC: 23 MG/DL — HIGH (ref 7–18)
CALCIUM SERPL-MCNC: 8.1 MG/DL — LOW (ref 8.4–10.5)
CHLORIDE SERPL-SCNC: 105 MMOL/L — SIGNIFICANT CHANGE UP (ref 96–108)
CO2 SERPL-SCNC: 30 MMOL/L — SIGNIFICANT CHANGE UP (ref 22–31)
COLOR SPEC: YELLOW — SIGNIFICANT CHANGE UP
CREAT SERPL-MCNC: 0.91 MG/DL — SIGNIFICANT CHANGE UP (ref 0.5–1.3)
DIFF PNL FLD: NEGATIVE — SIGNIFICANT CHANGE UP
GLUCOSE SERPL-MCNC: 179 MG/DL — HIGH (ref 70–99)
GLUCOSE UR QL: NEGATIVE — SIGNIFICANT CHANGE UP
HCT VFR BLD CALC: 38.9 % — LOW (ref 39–50)
HGB BLD-MCNC: 12.2 G/DL — LOW (ref 13–17)
KETONES UR-MCNC: NEGATIVE — SIGNIFICANT CHANGE UP
LACTATE SERPL-SCNC: 1.8 MMOL/L — SIGNIFICANT CHANGE UP (ref 0.7–2)
LACTATE SERPL-SCNC: 2.9 MMOL/L — HIGH (ref 0.7–2)
LACTATE SERPL-SCNC: 3.4 MMOL/L — HIGH (ref 0.7–2)
LEUKOCYTE ESTERASE UR-ACNC: ABNORMAL
MCHC RBC-ENTMCNC: 25.5 PG — LOW (ref 27–34)
MCHC RBC-ENTMCNC: 31.4 GM/DL — LOW (ref 32–36)
MCV RBC AUTO: 81.2 FL — SIGNIFICANT CHANGE UP (ref 80–100)
NITRITE UR-MCNC: POSITIVE
NRBC # BLD: 0 /100 WBCS — SIGNIFICANT CHANGE UP (ref 0–0)
PH UR: 7 — SIGNIFICANT CHANGE UP (ref 5–8)
PLATELET # BLD AUTO: 200 K/UL — SIGNIFICANT CHANGE UP (ref 150–400)
POTASSIUM SERPL-MCNC: 3.4 MMOL/L — LOW (ref 3.5–5.3)
POTASSIUM SERPL-SCNC: 3.4 MMOL/L — LOW (ref 3.5–5.3)
PROT UR-MCNC: NEGATIVE — SIGNIFICANT CHANGE UP
RBC # BLD: 4.79 M/UL — SIGNIFICANT CHANGE UP (ref 4.2–5.8)
RBC # FLD: 15.6 % — HIGH (ref 10.3–14.5)
RBC CASTS # UR COMP ASSIST: SIGNIFICANT CHANGE UP /HPF (ref 0–2)
SARS-COV-2 RNA SPEC QL NAA+PROBE: SIGNIFICANT CHANGE UP
SODIUM SERPL-SCNC: 141 MMOL/L — SIGNIFICANT CHANGE UP (ref 135–145)
SP GR SPEC: 1.01 — SIGNIFICANT CHANGE UP (ref 1.01–1.02)
UROBILINOGEN FLD QL: 1
WBC # BLD: 11.01 K/UL — HIGH (ref 3.8–10.5)
WBC # FLD AUTO: 11.01 K/UL — HIGH (ref 3.8–10.5)
WBC UR QL: SIGNIFICANT CHANGE UP /HPF (ref 0–5)

## 2021-05-23 PROCEDURE — 99223 1ST HOSP IP/OBS HIGH 75: CPT

## 2021-05-23 PROCEDURE — 12345: CPT | Mod: NC,GC

## 2021-05-23 RX ORDER — DIVALPROEX SODIUM 500 MG/1
1 TABLET, DELAYED RELEASE ORAL
Qty: 0 | Refills: 0 | DISCHARGE

## 2021-05-23 RX ORDER — SODIUM CHLORIDE 9 MG/ML
1000 INJECTION INTRAMUSCULAR; INTRAVENOUS; SUBCUTANEOUS ONCE
Refills: 0 | Status: COMPLETED | OUTPATIENT
Start: 2021-05-23 | End: 2021-05-23

## 2021-05-23 RX ORDER — LITHIUM CARBONATE 300 MG/1
150 TABLET, EXTENDED RELEASE ORAL
Qty: 0 | Refills: 0 | DISCHARGE

## 2021-05-23 RX ORDER — ENOXAPARIN SODIUM 100 MG/ML
40 INJECTION SUBCUTANEOUS DAILY
Refills: 0 | Status: DISCONTINUED | OUTPATIENT
Start: 2021-05-23 | End: 2021-05-25

## 2021-05-23 RX ORDER — FLUOXETINE HCL 10 MG
1 CAPSULE ORAL
Qty: 0 | Refills: 0 | DISCHARGE

## 2021-05-23 RX ORDER — POTASSIUM CHLORIDE 20 MEQ
40 PACKET (EA) ORAL ONCE
Refills: 0 | Status: COMPLETED | OUTPATIENT
Start: 2021-05-23 | End: 2021-05-23

## 2021-05-23 RX ORDER — LIDOCAINE HCL 20 MG/ML
5 VIAL (ML) INJECTION DAILY
Refills: 0 | Status: DISCONTINUED | OUTPATIENT
Start: 2021-05-23 | End: 2021-05-25

## 2021-05-23 RX ORDER — LEVOTHYROXINE SODIUM 125 MCG
50 TABLET ORAL DAILY
Refills: 0 | Status: DISCONTINUED | OUTPATIENT
Start: 2021-05-23 | End: 2021-05-25

## 2021-05-23 RX ORDER — PANTOPRAZOLE SODIUM 20 MG/1
40 TABLET, DELAYED RELEASE ORAL
Refills: 0 | Status: DISCONTINUED | OUTPATIENT
Start: 2021-05-23 | End: 2021-05-25

## 2021-05-23 RX ORDER — OXYCODONE AND ACETAMINOPHEN 5; 325 MG/1; MG/1
1 TABLET ORAL ONCE
Refills: 0 | Status: DISCONTINUED | OUTPATIENT
Start: 2021-05-23 | End: 2021-05-23

## 2021-05-23 RX ORDER — FLUTICASONE PROPIONATE AND SALMETEROL 50; 250 UG/1; UG/1
0 POWDER ORAL; RESPIRATORY (INHALATION)
Qty: 0 | Refills: 0 | DISCHARGE

## 2021-05-23 RX ORDER — TAMSULOSIN HYDROCHLORIDE 0.4 MG/1
0.4 CAPSULE ORAL AT BEDTIME
Refills: 0 | Status: DISCONTINUED | OUTPATIENT
Start: 2021-05-23 | End: 2021-05-25

## 2021-05-23 RX ORDER — ATORVASTATIN CALCIUM 80 MG/1
10 TABLET, FILM COATED ORAL AT BEDTIME
Refills: 0 | Status: DISCONTINUED | OUTPATIENT
Start: 2021-05-23 | End: 2021-05-25

## 2021-05-23 RX ORDER — POLYETHYLENE GLYCOL 3350 17 G/17G
1 POWDER, FOR SOLUTION ORAL
Qty: 0 | Refills: 0 | DISCHARGE

## 2021-05-23 RX ORDER — VALPROIC ACID (AS SODIUM SALT) 250 MG/5ML
1000 SOLUTION, ORAL ORAL
Refills: 0 | Status: DISCONTINUED | OUTPATIENT
Start: 2021-05-23 | End: 2021-05-23

## 2021-05-23 RX ORDER — VALPROIC ACID (AS SODIUM SALT) 250 MG/5ML
1000 SOLUTION, ORAL ORAL ONCE
Refills: 0 | Status: COMPLETED | OUTPATIENT
Start: 2021-05-23 | End: 2021-05-23

## 2021-05-23 RX ORDER — CEFTRIAXONE 500 MG/1
1000 INJECTION, POWDER, FOR SOLUTION INTRAMUSCULAR; INTRAVENOUS EVERY 24 HOURS
Refills: 0 | Status: DISCONTINUED | OUTPATIENT
Start: 2021-05-23 | End: 2021-05-25

## 2021-05-23 RX ORDER — AMANTADINE HCL 100 MG
1 CAPSULE ORAL
Qty: 0 | Refills: 0 | DISCHARGE

## 2021-05-23 RX ORDER — ACETAMINOPHEN 500 MG
650 TABLET ORAL EVERY 6 HOURS
Refills: 0 | Status: DISCONTINUED | OUTPATIENT
Start: 2021-05-23 | End: 2021-05-25

## 2021-05-23 RX ORDER — SODIUM CHLORIDE 9 MG/ML
1000 INJECTION INTRAMUSCULAR; INTRAVENOUS; SUBCUTANEOUS
Refills: 0 | Status: DISCONTINUED | OUTPATIENT
Start: 2021-05-23 | End: 2021-05-25

## 2021-05-23 RX ORDER — PERPHENAZINE 8 MG/1
1 TABLET, FILM COATED ORAL
Qty: 0 | Refills: 0 | DISCHARGE

## 2021-05-23 RX ORDER — CEFTRIAXONE 500 MG/1
1000 INJECTION, POWDER, FOR SOLUTION INTRAMUSCULAR; INTRAVENOUS ONCE
Refills: 0 | Status: COMPLETED | OUTPATIENT
Start: 2021-05-23 | End: 2021-05-23

## 2021-05-23 RX ORDER — VALPROIC ACID (AS SODIUM SALT) 250 MG/5ML
1000 SOLUTION, ORAL ORAL
Refills: 0 | Status: DISCONTINUED | OUTPATIENT
Start: 2021-05-24 | End: 2021-05-25

## 2021-05-23 RX ORDER — MOMETASONE FUROATE AND FORMOTEROL FUMARATE DIHYDRATE 200; 5 UG/1; UG/1
2 AEROSOL RESPIRATORY (INHALATION)
Qty: 0 | Refills: 0 | DISCHARGE

## 2021-05-23 RX ORDER — BACLOFEN 100 %
1 POWDER (GRAM) MISCELLANEOUS
Qty: 0 | Refills: 0 | DISCHARGE

## 2021-05-23 RX ORDER — FINASTERIDE 5 MG/1
5 TABLET, FILM COATED ORAL DAILY
Refills: 0 | Status: DISCONTINUED | OUTPATIENT
Start: 2021-05-23 | End: 2021-05-25

## 2021-05-23 RX ORDER — PHENAZOPYRIDINE HCL 100 MG
200 TABLET ORAL EVERY 8 HOURS
Refills: 0 | Status: DISCONTINUED | OUTPATIENT
Start: 2021-05-23 | End: 2021-05-25

## 2021-05-23 RX ORDER — OXCARBAZEPINE 300 MG/1
1 TABLET, FILM COATED ORAL
Qty: 0 | Refills: 0 | DISCHARGE

## 2021-05-23 RX ORDER — LANOLIN ALCOHOL/MO/W.PET/CERES
5 CREAM (GRAM) TOPICAL AT BEDTIME
Refills: 0 | Status: DISCONTINUED | OUTPATIENT
Start: 2021-05-23 | End: 2021-05-25

## 2021-05-23 RX ORDER — GABAPENTIN 400 MG/1
1 CAPSULE ORAL
Qty: 0 | Refills: 0 | DISCHARGE

## 2021-05-23 RX ORDER — PANTOPRAZOLE SODIUM 20 MG/1
1 TABLET, DELAYED RELEASE ORAL
Qty: 0 | Refills: 0 | DISCHARGE

## 2021-05-23 RX ORDER — BENZTROPINE MESYLATE 1 MG
1 TABLET ORAL
Qty: 0 | Refills: 0 | DISCHARGE

## 2021-05-23 RX ORDER — SIMVASTATIN 20 MG/1
1 TABLET, FILM COATED ORAL
Qty: 0 | Refills: 0 | DISCHARGE

## 2021-05-23 RX ADMIN — OXYCODONE AND ACETAMINOPHEN 1 TABLET(S): 5; 325 TABLET ORAL at 05:26

## 2021-05-23 RX ADMIN — ATORVASTATIN CALCIUM 10 MILLIGRAM(S): 80 TABLET, FILM COATED ORAL at 21:06

## 2021-05-23 RX ADMIN — SODIUM CHLORIDE 1000 MILLILITER(S): 9 INJECTION INTRAMUSCULAR; INTRAVENOUS; SUBCUTANEOUS at 09:44

## 2021-05-23 RX ADMIN — SODIUM CHLORIDE 1000 MILLILITER(S): 9 INJECTION INTRAMUSCULAR; INTRAVENOUS; SUBCUTANEOUS at 05:26

## 2021-05-23 RX ADMIN — Medication 0.2 MILLIGRAM(S): at 22:54

## 2021-05-23 RX ADMIN — Medication 1000 MILLIGRAM(S): at 19:24

## 2021-05-23 RX ADMIN — Medication 5 MILLILITER(S): at 22:54

## 2021-05-23 RX ADMIN — TAMSULOSIN HYDROCHLORIDE 0.4 MILLIGRAM(S): 0.4 CAPSULE ORAL at 21:06

## 2021-05-23 RX ADMIN — Medication 40 MILLIEQUIVALENT(S): at 09:48

## 2021-05-23 RX ADMIN — SODIUM CHLORIDE 75 MILLILITER(S): 9 INJECTION INTRAMUSCULAR; INTRAVENOUS; SUBCUTANEOUS at 08:28

## 2021-05-23 RX ADMIN — Medication 5 MILLIGRAM(S): at 22:47

## 2021-05-23 RX ADMIN — CEFTRIAXONE 100 MILLIGRAM(S): 500 INJECTION, POWDER, FOR SOLUTION INTRAMUSCULAR; INTRAVENOUS at 03:02

## 2021-05-23 RX ADMIN — Medication 200 MILLIGRAM(S): at 15:27

## 2021-05-23 RX ADMIN — OXYCODONE AND ACETAMINOPHEN 1 TABLET(S): 5; 325 TABLET ORAL at 08:00

## 2021-05-23 RX ADMIN — CEFTRIAXONE 100 MILLIGRAM(S): 500 INJECTION, POWDER, FOR SOLUTION INTRAMUSCULAR; INTRAVENOUS at 09:44

## 2021-05-23 RX ADMIN — FINASTERIDE 5 MILLIGRAM(S): 5 TABLET, FILM COATED ORAL at 13:50

## 2021-05-23 RX ADMIN — Medication 200 MILLIGRAM(S): at 08:26

## 2021-05-23 RX ADMIN — Medication 200 MILLIGRAM(S): at 21:07

## 2021-05-23 NOTE — H&P ADULT - HISTORY OF PRESENT ILLNESS
30 y/o male from [facility] group home [facility], h/o HTN, HLD, development delay, autism, intellectual disability, mood disorder, factitious disorder, past psych admissions, unable to provide reliable history, p/w urinary retension accompanied with staff from facility. Pt was diagnosed with a UTI and has been on abx. Pt also has urinary retention secondary to dysuria but got increasingly agitated with a mckeon catheter so he was seen at [facility] 5/21 and the mckeon catheter was taken out. Pt has been unable to urinate secondary to dysuria. Pt reports generalized weakness, tremor and suprapubic abdominal pain. pt is unreliable historian. Pt states he has abd pain x 2mon, and doesn't have BM x 2mon, states he drinks Budlight 5can daily. However, per group home staff, they hear pt was urinating, he doesn't drink daily, and not sure about BM. No fever, vomiting, diarrhea   30 y/o male from [facility] group home [facility], h/o HTN, HLD, development delay, autism, intellectual disability, mood disorder, factitious disorder, past psych admissions, unable to provide reliable history, p/w urinary retension accompanied with staff from facility. Pt was diagnosed with a UTI and has been on abx. Pt also has urinary retention secondary to dysuria but got increasingly agitated with a mckeon catheter so he was seen at [facility] 5/21 and the mckeon catheter was taken out. Pt has been unable to urinate secondary to dysuria. Pt reports generalized weakness, tremor and suprapubic abdominal pain. pt is unreliable historian. Pt states he has abd pain x 2mon, and doesn't have BM x 2mon, states he drinks Budlight 5can daily. However, per group home staff, they hear pt was urinating, he doesn't drink daily, and not sure about BM. Per group home staff, pt always calls for his condition such as he is bleeding, he has pain etc. No fever, vomiting, diarrhea

## 2021-05-23 NOTE — H&P ADULT - PROBLEM SELECTOR PLAN 1
Patient c/o severe burning with urination preventing him from urinating, UA with nitrites  -Continue pyridium and add intrauretheral lidocaine  -Continue ceftriaxone  -f/u blood and urine cultures for c/s  -Continue mckeon for now, will consider  eval in AM Patient c/o severe burning with urination preventing him from urinating, UA with nitrites  -Continue pyridium and add intrauretheral lidocaine  -Continue ceftriaxone  - Pain control with tylenol   -f/u blood and urine cultures for c/s  -Continue mckeon for now, will consider  eval in AM

## 2021-05-23 NOTE — ED PROVIDER NOTE - PMH
Asthma    Autism    Enuresis    Factitious disorder    GERD (gastroesophageal reflux disease)    Hyperthyroidism    Impulse control disorder    Intellectual disability    Mitral valve disease    Mood disorder    Myopia of both eyes    Urinary retention

## 2021-05-23 NOTE — H&P ADULT - PROBLEM SELECTOR PLAN 3
Patient with mood disorder and impulsive behavior  -Continue 1:1 provided by group home  -Continue depakote  -Continue clonidine for impulsivity

## 2021-05-23 NOTE — H&P ADULT - NSICDXPASTMEDICALHX_GEN_ALL_CORE_FT
PAST MEDICAL HISTORY:  Asthma     Autism     Enuresis     Factitious disorder     GERD (gastroesophageal reflux disease)     Hyperthyroidism     Impulse control disorder     Intellectual disability     Mitral valve disease     Mood disorder     Myopia of both eyes     Urinary retention

## 2021-05-23 NOTE — H&P ADULT - ASSESSMENT
31 year old man with PMH of Mental restriction with developmental delay, ADHD, asthma sent in from the Group Home on account of difficulty urinating / refusing to pee due to the burning, UA positive. 31 year old man with PMH of Mental restriction with developmental delay, ADHD, asthma, impulse disorder and mood disorder sent in from the Group Home on account of difficulty urinating / refusing to urinate due to the burning, UA positive.

## 2021-05-23 NOTE — ED PROVIDER NOTE - PHYSICAL EXAMINATION
General: pt lying in stretcher, appears stated age and is not in distress  HEENT: AT/NC, pink conjunctiva, anicteric sclerae, EOMI, PERRLA, TMs smooth, grey, intact, with normal landmarks, nasal mucosa pink, no discharge, turbinates not enlarged; moist mucus membranes, tongue well-papillated, good dentition; posterior pharynx shows no erythema or exudates;   Neck: supple, full ROM, trachea midline, no JVD, no cervical LAD, no midline ttp or stepoffs  Lungs: symmetric excursion, b/l clear vesicular breath sounds with no wheezes, crackles, or rhonchi  Heart: rrr, S1, S2 normal; no S3 or S4; no murmurs or rubs  Abd: normal bowel sounds; soft, nontender; negative McBurney's point tenderness, negative Childs's sign, no rebound, no guarding, spleen non-palpable; no hepatomegaly, no masses  Back: no midline spinal tenderness or stepoffs, no costovertebral angle tenderness  Extremities: no clubbing, cyanosis, or edema; no palpable deformities or fractures  Skin: good turgor; no rashes, petechiae, ecchymoses, or jaundice  Pulses: radial, posterior tibialis (PT), dorsalis pedis (DP) all 2+ & symmetric  Neuro: awake, alert, responsive; oriented to person, place and time; cranial nerves intact, EOMI, intact jaw movement, intact facial sensation, no facial asymmetry, hearing intact; no nystagmus, tongue midline; Motor: Normal tone in upper and lower extremities bilaterally strength 5/5; Sensory: intact to pinprick and light touch; Cerebellar: finger-to-nose intact; normal steady gait; negative Romberg’s sign; DTR: biceps, triceps, patellar, 2+, no pronator drift General: pt lying in stretcher, appears stated age and is not in distress  HEENT: AT/NC, pink conjunctiva, anicteric sclerae, EOMI, PERRLA, mmm  Neck: supple, full ROM, trachea midline, no JVD, no cervical LAD, no midline ttp or stepoffs  Lungs: symmetric excursion, b/l clear vesicular breath sounds with no wheezes, crackles, or rhonchi  Heart: rrr, S1, S2 normal; no S3 or S4; no murmurs or rubs  Abd: normal bowel sounds; soft, +suprapubic tender; negative McBurney's point tenderness, negative Childs's sign, no rebound, no guarding, spleen non-palpable; no hepatomegaly, no masses  Extremities: no clubbing, cyanosis, or edema; no palpable deformities or fractures  Skin: good turgor; no rashes, petechiae, ecchymoses, or jaundice  Pulses: radial, posterior tibialis (PT), dorsalis pedis (DP) all 2+ & symmetric  Neuro: awake, alert, responsive; cranial nerves intact, EOMI, intact jaw movement, intact facial sensation, no facial asymmetry, hearing intact; Motor: Normal tone in upper and lower extremities bilaterally; Sensory: intact

## 2021-05-23 NOTE — ED ADULT NURSE REASSESSMENT NOTE - NS ED NURSE REASSESS COMMENT FT1
as per Pt assistance, when pt discharge from hospital, please contact Ms.Vanesse Pak [phone]
Pt is alert and oriented with hx autism. Pt reports he cannot walk. Ecchymosis noted to lower abdomen. [place] to gravity drain draining orange tinted urine after pyridium.  IVF in progress. Pt eating breakfast. Per charge nurse conversation with group home director Cassy pt is an elopement risk has hx of elopement and will send a group home aide to sit with pt at 11am. Pt safety maintained.
Sarabia catheter to gravity drain draining clear yellow urine

## 2021-05-23 NOTE — H&P ADULT - NSHPPHYSICALEXAM_GEN_ALL_CORE
PHYSICAL EXAM:  GENERAL: in mild distress from pain, well-developed  HEAD:  Atraumatic, Normocephalic  EYES:  conjunctiva and sclera clear  NECK: Supple, No JVD  CHEST/LUNG: Clear to auscultation bilaterally; No wheeze (anterior exam as patient declines to sit up_  HEART: Regular rate and rhythm; No murmurs,  ABDOMEN: Soft, Nontender, Nondistended; Bowel sounds present, +mckeon with orange urine  EXTREMITIES:  2+ Peripheral Pulses, No clubbing, cyanosis, or edema  PSYCH: AAOx3  NEUROLOGY: non-focal  SKIN: No rashes or lesions, eye brow piercing PHYSICAL EXAM:  GENERAL: in mild distress from pain, well-developed  HEAD:  Atraumatic, Normocephalic  EYES:  conjunctiva and sclera clear  NECK: Supple, No JVD  CHEST/LUNG: Clear to auscultation bilaterally; No wheeze (anterior exam as patient declines to sit up_  HEART: Regular rate and rhythm; No murmurs,  ABDOMEN: Soft, tender in LUQ and LLQ, Nondistended; Bowel sounds present, +mckeon with orange urine  EXTREMITIES:  2+ Peripheral Pulses, No clubbing, cyanosis, or edema  PSYCH: AAOx3  NEUROLOGY: non-focal  SKIN: No rashes or lesions, eye brow piercing

## 2021-05-23 NOTE — H&P ADULT - PROBLEM SELECTOR PLAN 2
Concerns for urinary retention and previously with mckeon catheter, removed but then patient refused to urinate due to pain so relaced  -Continue finasteride and flomax  -Consider  eval in the AM

## 2021-05-23 NOTE — H&P ADULT - ATTENDING COMMENTS
Pt seen and examined  Case to be  discussed with MAR.  31 year old man with PMH of Mental restriction with developmental delay, ADHD, asthma sent in from the Group Home on account of difficulty urinating / refusing to pee due to the burning .LILIAM joshi was recently diagnosed with UTI from another facility. He was not tolerating a mckeon catheter.  Associated weakness.     Vital Signs Last 24 Hrs  T(C): 36.9 (22 May 2021 23:55), Max: 36.9 (22 May 2021 23:55)  T(F): 98.4 (22 May 2021 23:55), Max: 98.4 (22 May 2021 23:55)  HR: 120 (22 May 2021 23:55) (120 - 120)  BP: 129/80 (22 May 2021 23:55) (129/80 - 129/80)  RR: 16 (22 May 2021 23:55) (16 - 16)  SpO2: 95% (22 May 2021 23:55) (95% - 95%)      Labs                        12.2   11.01 )-----------( 200      ( 23 May 2021 01:27 )             38.9     05-23    141  |  105  |  23<H>  ----------------------------<  179<H>  3.4<L>   |  30  |  0.91    Ca    8.1<L>      23 May 2021 01:27    lactate - 2.9    Urinalysis Basic - ( 23 May 2021 03:31 )    Color: Yellow / Appearance: Clear / S.015 / pH: x  Gluc: x / Ketone: Negative  / Bili: Negative / Urobili: 1   Blood: x / Protein: Negative / Nitrite: Positive   Leuk Esterase: Trace / RBC: 0-2 /HPF / WBC 0-2 /HPF   Sq Epi: x / Non Sq Epi: x / Bacteria: x    Impression   - Acute UTI   already on treatment   - Developmental delay with MR with acute agitation resulting from burning.      Plan   - Admit to Medicine   - Complete sepsis work up   - Continue IV antibiotics - ceftriaxone 1g q daily   - Urinary antiseptics for burning sensation - phenazopyridine 100 - 200mg PO TID  - Pain control   - Fall risk

## 2021-05-23 NOTE — CHART NOTE - NSCHARTNOTEFT_GEN_A_CORE
Paged by RN stating pt is saying he wants to kill himself. Pt was evaluated by the bedside.   When pt was asked if he wants to hurt himself, he states yes.  When he was asked how, he states he will break his wrist.   When pt was asked if he wants to kill himself, he states yes. Per group home staff, he never attempted suicide, however he has hx of self-harm.   Pt was placed to 1 :1, Nursing manager/supervisor was notified by RN. Paged by RN stating pt is saying he wants to kill himself. Pt was evaluated by the bedside.   When pt was asked if he wants to hurt himself, he states yes.  When he was asked how, he states he will break his wrist.   When pt was asked if he wants to kill himself, he states yes. Per group home staff, he never attempted suicide, however he has hx of self-harm.   Pt was placed to 1 :1, Nursing manager/supervisor was notified by RN.  Plan for psych eval tomorrow AM.

## 2021-05-23 NOTE — ED PROVIDER NOTE - OBJECTIVE STATEMENT
32 y/o male h/o development delay, autism, part of outpatient facility, unable to provide reliable history, here with staff from facility. Pt was diagnosed with a UTI and has been on abx. Pt also has urinary retention secondary to dysuria but got increasingly agitated with a mckeon catheter so he was seen at [facility] yesterday and the mckeon catheter was taken out. Pt has been unable to urinate secondary to dysuria. Pt reports generalized weakness and suprapubic abdominal pain. No fever. No vomiting. No diarrhea. 32 y/o male h/o DM, development delay, autism, part of [facility] outpatient facility, unable to provide reliable history, here with staff from facility. Pt was diagnosed with a UTI and has been on abx. Pt also has urinary retention secondary to dysuria but got increasingly agitated with a mckeon catheter so he was seen at [facility] yesterday and the mckeon catheter was taken out. Pt has been unable to urinate secondary to dysuria. Pt reports generalized weakness and suprapubic abdominal pain. No fever. No vomiting. No diarrhea.

## 2021-05-23 NOTE — H&P ADULT - NSHPLABSRESULTS_GEN_ALL_CORE
12.2    )-----------( 200      ( 23 May 2021 01:27 )             38.9         141  |  105  |  23<H>  ----------------------------<  179<H>  3.4<L>   |  30  |  0.91    Ca    8.1<L>      23 May 2021 01:27      Lactate, Blood: 1.8 mmol/L (21 @ 11:49) <--3.4    Urinalysis Basic - ( 23 May 2021 03:31 )    Color: Yellow / Appearance: Clear / S.015 / pH: x  Gluc: x / Ketone: Negative  / Bili: Negative / Urobili: 1   Blood: x / Protein: Negative / Nitrite: Positive   Leuk Esterase: Trace / RBC: 0-2 /HPF / WBC 0-2 /HPF   Sq Epi: x / Non Sq Epi: x / Bacteria: x

## 2021-05-24 LAB
24R-OH-CALCIDIOL SERPL-MCNC: 18.1 NG/ML — LOW (ref 30–80)
A1C WITH ESTIMATED AVERAGE GLUCOSE RESULT: 6.1 % — HIGH (ref 4–5.6)
ALBUMIN SERPL ELPH-MCNC: 2.7 G/DL — LOW (ref 3.5–5)
ALP SERPL-CCNC: 75 U/L — SIGNIFICANT CHANGE UP (ref 40–120)
ALT FLD-CCNC: 40 U/L DA — SIGNIFICANT CHANGE UP (ref 10–60)
ANION GAP SERPL CALC-SCNC: 7 MMOL/L — SIGNIFICANT CHANGE UP (ref 5–17)
AST SERPL-CCNC: 9 U/L — LOW (ref 10–40)
BASOPHILS # BLD AUTO: 0.04 K/UL — SIGNIFICANT CHANGE UP (ref 0–0.2)
BASOPHILS NFR BLD AUTO: 0.5 % — SIGNIFICANT CHANGE UP (ref 0–2)
BILIRUB SERPL-MCNC: 0.3 MG/DL — SIGNIFICANT CHANGE UP (ref 0.2–1.2)
BUN SERPL-MCNC: 16 MG/DL — SIGNIFICANT CHANGE UP (ref 7–18)
CALCIUM SERPL-MCNC: 8.6 MG/DL — SIGNIFICANT CHANGE UP (ref 8.4–10.5)
CHLORIDE SERPL-SCNC: 107 MMOL/L — SIGNIFICANT CHANGE UP (ref 96–108)
CHOLEST SERPL-MCNC: 150 MG/DL — SIGNIFICANT CHANGE UP
CO2 SERPL-SCNC: 28 MMOL/L — SIGNIFICANT CHANGE UP (ref 22–31)
COVID-19 SPIKE DOMAIN AB INTERP: POSITIVE
COVID-19 SPIKE DOMAIN ANTIBODY RESULT: >250 U/ML — HIGH
CREAT SERPL-MCNC: 0.88 MG/DL — SIGNIFICANT CHANGE UP (ref 0.5–1.3)
CRP SERPL-MCNC: <3 MG/L — SIGNIFICANT CHANGE UP
CULTURE RESULTS: NO GROWTH — SIGNIFICANT CHANGE UP
EOSINOPHIL # BLD AUTO: 0.2 K/UL — SIGNIFICANT CHANGE UP (ref 0–0.5)
EOSINOPHIL NFR BLD AUTO: 2.4 % — SIGNIFICANT CHANGE UP (ref 0–6)
ERYTHROCYTE [SEDIMENTATION RATE] IN BLOOD: 2 MM/HR — SIGNIFICANT CHANGE UP (ref 0–15)
ESTIMATED AVERAGE GLUCOSE: 128 MG/DL — HIGH (ref 68–114)
FOLATE SERPL-MCNC: 7.2 NG/ML — SIGNIFICANT CHANGE UP
GLUCOSE SERPL-MCNC: 107 MG/DL — HIGH (ref 70–99)
HCT VFR BLD CALC: 42.2 % — SIGNIFICANT CHANGE UP (ref 39–50)
HDLC SERPL-MCNC: 42 MG/DL — SIGNIFICANT CHANGE UP
HGB BLD-MCNC: 13 G/DL — SIGNIFICANT CHANGE UP (ref 13–17)
IMM GRANULOCYTES NFR BLD AUTO: 0.6 % — SIGNIFICANT CHANGE UP (ref 0–1.5)
LDH SERPL L TO P-CCNC: 223 U/L — SIGNIFICANT CHANGE UP (ref 120–225)
LIPID PNL WITH DIRECT LDL SERPL: 70 MG/DL — SIGNIFICANT CHANGE UP
LYMPHOCYTES # BLD AUTO: 3.31 K/UL — HIGH (ref 1–3.3)
LYMPHOCYTES # BLD AUTO: 39.6 % — SIGNIFICANT CHANGE UP (ref 13–44)
MAGNESIUM SERPL-MCNC: 2.2 MG/DL — SIGNIFICANT CHANGE UP (ref 1.6–2.6)
MCHC RBC-ENTMCNC: 25.2 PG — LOW (ref 27–34)
MCHC RBC-ENTMCNC: 30.8 GM/DL — LOW (ref 32–36)
MCV RBC AUTO: 81.8 FL — SIGNIFICANT CHANGE UP (ref 80–100)
MONOCYTES # BLD AUTO: 0.7 K/UL — SIGNIFICANT CHANGE UP (ref 0–0.9)
MONOCYTES NFR BLD AUTO: 8.4 % — SIGNIFICANT CHANGE UP (ref 2–14)
NEUTROPHILS # BLD AUTO: 4.05 K/UL — SIGNIFICANT CHANGE UP (ref 1.8–7.4)
NEUTROPHILS NFR BLD AUTO: 48.5 % — SIGNIFICANT CHANGE UP (ref 43–77)
NON HDL CHOLESTEROL: 108 MG/DL — SIGNIFICANT CHANGE UP
NRBC # BLD: 0 /100 WBCS — SIGNIFICANT CHANGE UP (ref 0–0)
PHOSPHATE SERPL-MCNC: 4 MG/DL — SIGNIFICANT CHANGE UP (ref 2.5–4.5)
PLATELET # BLD AUTO: 193 K/UL — SIGNIFICANT CHANGE UP (ref 150–400)
POTASSIUM SERPL-MCNC: 4.4 MMOL/L — SIGNIFICANT CHANGE UP (ref 3.5–5.3)
POTASSIUM SERPL-SCNC: 4.4 MMOL/L — SIGNIFICANT CHANGE UP (ref 3.5–5.3)
PROT SERPL-MCNC: 5.6 G/DL — LOW (ref 6–8.3)
RBC # BLD: 5.16 M/UL — SIGNIFICANT CHANGE UP (ref 4.2–5.8)
RBC # FLD: 15.4 % — HIGH (ref 10.3–14.5)
SARS-COV-2 IGG+IGM SERPL QL IA: >250 U/ML — HIGH
SARS-COV-2 IGG+IGM SERPL QL IA: POSITIVE
SODIUM SERPL-SCNC: 142 MMOL/L — SIGNIFICANT CHANGE UP (ref 135–145)
SPECIMEN SOURCE: SIGNIFICANT CHANGE UP
TRIGL SERPL-MCNC: 191 MG/DL — HIGH
TSH SERPL-MCNC: 5.22 UU/ML — HIGH (ref 0.34–4.82)
VIT B12 SERPL-MCNC: 403 PG/ML — SIGNIFICANT CHANGE UP (ref 232–1245)
WBC # BLD: 8.35 K/UL — SIGNIFICANT CHANGE UP (ref 3.8–10.5)
WBC # FLD AUTO: 8.35 K/UL — SIGNIFICANT CHANGE UP (ref 3.8–10.5)

## 2021-05-24 PROCEDURE — 99233 SBSQ HOSP IP/OBS HIGH 50: CPT | Mod: GC

## 2021-05-24 RX ORDER — CHLORPROMAZINE HCL 10 MG
50 TABLET ORAL EVERY 6 HOURS
Refills: 0 | Status: DISCONTINUED | OUTPATIENT
Start: 2021-05-24 | End: 2021-05-25

## 2021-05-24 RX ADMIN — CEFTRIAXONE 100 MILLIGRAM(S): 500 INJECTION, POWDER, FOR SOLUTION INTRAMUSCULAR; INTRAVENOUS at 16:56

## 2021-05-24 RX ADMIN — Medication 200 MILLIGRAM(S): at 06:10

## 2021-05-24 RX ADMIN — FINASTERIDE 5 MILLIGRAM(S): 5 TABLET, FILM COATED ORAL at 11:16

## 2021-05-24 RX ADMIN — ENOXAPARIN SODIUM 40 MILLIGRAM(S): 100 INJECTION SUBCUTANEOUS at 11:16

## 2021-05-24 RX ADMIN — Medication 200 MILLIGRAM(S): at 21:14

## 2021-05-24 RX ADMIN — Medication 5 MILLIGRAM(S): at 21:13

## 2021-05-24 RX ADMIN — Medication 1000 MILLIGRAM(S): at 17:03

## 2021-05-24 RX ADMIN — Medication 50 MICROGRAM(S): at 06:10

## 2021-05-24 RX ADMIN — TAMSULOSIN HYDROCHLORIDE 0.4 MILLIGRAM(S): 0.4 CAPSULE ORAL at 21:13

## 2021-05-24 RX ADMIN — Medication 1000 MILLIGRAM(S): at 06:29

## 2021-05-24 RX ADMIN — ATORVASTATIN CALCIUM 10 MILLIGRAM(S): 80 TABLET, FILM COATED ORAL at 21:13

## 2021-05-24 RX ADMIN — Medication 0.2 MILLIGRAM(S): at 21:14

## 2021-05-24 RX ADMIN — PANTOPRAZOLE SODIUM 40 MILLIGRAM(S): 20 TABLET, DELAYED RELEASE ORAL at 06:11

## 2021-05-24 RX ADMIN — Medication 200 MILLIGRAM(S): at 13:27

## 2021-05-24 RX ADMIN — SODIUM CHLORIDE 75 MILLILITER(S): 9 INJECTION INTRAMUSCULAR; INTRAVENOUS; SUBCUTANEOUS at 00:08

## 2021-05-24 RX ADMIN — SODIUM CHLORIDE 75 MILLILITER(S): 9 INJECTION INTRAMUSCULAR; INTRAVENOUS; SUBCUTANEOUS at 06:37

## 2021-05-24 NOTE — BH CONSULTATION LIAISON ASSESSMENT NOTE - RISK ASSESSMENT
Pt is at chronically elevated risk of harm to self/others given h/o self-harm, h/o aggression, poor impulse control and poor frustration tolerance due to intellectual disability, protective factors include being domiciled, no hx SA, on 1:1 supervision at respite, no access to guns/weapons, denies insomnia, denies SI/HI, future-oriented, help-seeking.   Pt is not at acutely elevated risk of harm to self or others.

## 2021-05-24 NOTE — BH CONSULTATION LIAISON ASSESSMENT NOTE - HPI (INCLUDE ILLNESS QUALITY, SEVERITY, DURATION, TIMING, CONTEXT, MODIFYING FACTORS, ASSOCIATED SIGNS AND SYMPTOMS)
32yo M, domiciled at [facility] (but has been at respite on 1:1 since Oct 2020), PPH of intellectual disability, mood disorder, factitious disorder, past psych admissions, no known SA, hx of self injury (nonsuicidal), multiple ED visits due to behavioral and attention seeking behaviors, h/o expressing SI then recant, hx of aggression, no active legal issues, admitted for UTI 2/2 urinary retention on Abx, psych consulted as patient started endorsing SI in the context of break up with ex gf.  Patient is an unreliable historian, upon approached and made known that writer was a psychiatrist, patient states that "I am not suicidal", "I do not want to kill myself".  When asked why he expressed SI, he said, "ask my mother, she knows what I am going through".  Patient said he is going through a breakup that occurred 2 years ago, ex gf used to live at same group home.  Patient states he is feeling fine, and is ready to go back to [facility] home is he is cleared.  He denied SI/HI/AVH, no notable signs of psychosis or everett is noted.  During evaluation, patient is noted to have attention seeking behavior, such as talking about staff at [facility] home took him to liquor store to buy alcohol, said he drinks 2-6 cans of beers at times, not everyday drinker, last drink as 5/11.  Patient also perseverates on psych CL team calling his mother to inquire about his home medication, states mother wants him to go to [facility] to adjust his medication but he does not see such as need.  He instead states wanting to go to AA meetings/program to help with drinking.  Patient further more states he is able to urinate now, denied acute medical symptoms.  Of note, though patient reports h/o drinking, but BAL has not been positive during ED visits/hospital admissions.      Collateral was obtained by [facility], please see her note.  In brief, patient has not returned to [facility], instead he has been at respite with 1:1 due to longstanding behavioral issues mostly with limit setting, patient can get upset/aggressive when things note getting his way.  Patient has no SA, but has h/o self harm.

## 2021-05-24 NOTE — BH CONSULTATION LIAISON ASSESSMENT NOTE - NSBHCHARTREVIEWVS_PSY_A_CORE FT
Vital Signs Last 24 Hrs  T(C): 36.7 (24 May 2021 14:35), Max: 36.7 (24 May 2021 14:35)  T(F): 98 (24 May 2021 14:35), Max: 98 (24 May 2021 14:35)  HR: 83 (24 May 2021 14:35) (68 - 84)  BP: 106/68 (24 May 2021 14:35) (97/65 - 106/68)  BP(mean): --  RR: 17 (24 May 2021 14:35) (17 - 18)  SpO2: 97% (24 May 2021 14:35) (94% - 98%)

## 2021-05-24 NOTE — PROGRESS NOTE ADULT - SUBJECTIVE AND OBJECTIVE BOX
PGY-1 Progress Note discussed with attending    PAGER #: [[phone]] TILL 5:00 PM  PLEASE CONTACT ON CALL TEAM:  - On Call Team (Please refer to Leah) FROM 5:00 PM - 8:30PM  - Nightfloat Team FROM 8:30 -7:30 AM    OVERNIGHT EVENTS:   - Patient reported suicidal ideation overnight. He reports mild sadness due to break up that was over 2 years ago. He also complains of dysuria, but is no longer retaining.     REVIEW OF SYSTEMS:  CONSTITUTIONAL: No fever, weight loss, or fatigue  PSYCH: reports suicidal ideation  RESPIRATORY: No cough, wheezing, chills or hemoptysis; No shortness of breath  CARDIOVASCULAR: No chest pain, palpitations, dizziness, or leg swelling  GASTROINTESTINAL: No abdominal pain. No nausea, vomiting, or hematemesis; No diarrhea or constipation. No melena or hematochezia.  GENITOURINARY: complains of dysuria and hematuria; No urinary frequency  NEUROLOGICAL: No headaches, memory loss, loss of strength, numbness, or tremors  SKIN: No itching, burning, rashes, or lesions     MEDICATIONS  (STANDING):  atorvastatin 10 milliGRAM(s) Oral at bedtime  cefTRIAXone   IVPB 1000 milliGRAM(s) IV Intermittent every 24 hours  cloNIDine 0.2 milliGRAM(s) Oral at bedtime  enoxaparin Injectable 40 milliGRAM(s) SubCutaneous daily  finasteride 5 milliGRAM(s) Oral daily  levothyroxine 50 MICROGram(s) Oral daily  melatonin 5 milliGRAM(s) Oral at bedtime  pantoprazole    Tablet 40 milliGRAM(s) Oral before breakfast  phenazopyridine 200 milliGRAM(s) Oral every 8 hours  sodium chloride 0.9%. 1000 milliLiter(s) (75 mL/Hr) IV Continuous <Continuous>  tamsulosin 0.4 milliGRAM(s) Oral at bedtime  valproic acid 1000 milliGRAM(s) Oral two times a day    MEDICATIONS  (PRN):  acetaminophen   Tablet .. 650 milliGRAM(s) Oral every 6 hours PRN Mild Pain (1 - 3)  lidocaine 2% Jelly 5 milliLiter(s) IntraUrethral daily PRN pain      Vital Signs Last 24 Hrs  T(C): 36.6 (24 May 2021 16:21), Max: 36.7 (24 May 2021 14:35)  T(F): 97.9 (24 May 2021 16:21), Max: 98 (24 May 2021 14:35)  HR: 87 (24 May 2021 16:21) (68 - 87)  BP: 123/80 (24 May 2021 16:21) (97/65 - 123/80)  BP(mean): --  RR: 18 (24 May 2021 16:21) (17 - 18)  SpO2: 95% (24 May 2021 16:21) (94% - 98%)    PHYSICAL EXAMINATION:  GENERAL: NAD, AAOx3, but has underlying cognitive impairement  HEAD: AT/NC  EYES: conjunctiva and sclera clear  NECK: supple, No JVD noted, Normal thyroid  CHEST/LUNG: CTABL; no rales, rhonchi, wheezing, or rubs  HEART: regular rate and rhythm; no murmurs, rubs, or gallops  ABDOMEN: soft, nontender, nondistended; Bowel sounds present  EXTREMITIES:  2+ Peripheral Pulses, No clubbing, cyanosis, or edema  SKIN: warm dry                          13.0   8.35  )-----------( 193      ( 24 May 2021 06:43 )             42.2     05-24    142  |  107  |  16  ----------------------------<  107<H>  4.4   |  28  |  0.88    Ca    8.6      24 May 2021 06:42  Phos  4.0     05-24  Mg     2.2     05-24    TPro  5.6<L>  /  Alb  2.7<L>  /  TBili  0.3  /  DBili  x   /  AST  9<L>  /  ALT  40  /  AlkPhos  75  05-24    LIVER FUNCTIONS - ( 24 May 2021 06:42 )  Alb: 2.7 g/dL / Pro: 5.6 g/dL / ALK PHOS: 75 U/L / ALT: 40 U/L DA / AST: 9 U/L / GGT: x                 COVID-19 PCR: NotDetec (23 May 2021 05:37)  COVID-19 PCR: NotDetec (02 Mar 2021 03:46)      CAPILLARY BLOOD GLUCOSE          RADIOLOGY & ADDITIONAL TESTS:                   PGY-1 Progress Note discussed with attending    PAGER #: [[phone]] TILL 5:00 PM  PLEASE CONTACT ON CALL TEAM:  - On Call Team (Please refer to Leah) FROM 5:00 PM - 8:30PM  - Nightfloat Team FROM 8:30 -7:30 AM    OVERNIGHT EVENTS:   - Patient reported suicidal ideation overnight. He reports mild sadness due to break up that was over 2 years ago. He also complains of dysuria, but states he is no longer retaining.     REVIEW OF SYSTEMS:  CONSTITUTIONAL: No fever, weight loss, or fatigue  PSYCH: reports suicidal ideation  RESPIRATORY: No cough, wheezing, chills or hemoptysis; No shortness of breath  CARDIOVASCULAR: No chest pain, palpitations, dizziness, or leg swelling  GASTROINTESTINAL: No abdominal pain. No nausea, vomiting, or hematemesis; No diarrhea or constipation. No melena or hematochezia.  GENITOURINARY: complains of dysuria and hematuria; No urinary frequency  NEUROLOGICAL: No headaches, memory loss, loss of strength, numbness, or tremors  SKIN: No itching, burning, rashes, or lesions     MEDICATIONS  (STANDING):  atorvastatin 10 milliGRAM(s) Oral at bedtime  cefTRIAXone   IVPB 1000 milliGRAM(s) IV Intermittent every 24 hours  cloNIDine 0.2 milliGRAM(s) Oral at bedtime  enoxaparin Injectable 40 milliGRAM(s) SubCutaneous daily  finasteride 5 milliGRAM(s) Oral daily  levothyroxine 50 MICROGram(s) Oral daily  melatonin 5 milliGRAM(s) Oral at bedtime  pantoprazole    Tablet 40 milliGRAM(s) Oral before breakfast  phenazopyridine 200 milliGRAM(s) Oral every 8 hours  sodium chloride 0.9%. 1000 milliLiter(s) (75 mL/Hr) IV Continuous <Continuous>  tamsulosin 0.4 milliGRAM(s) Oral at bedtime  valproic acid 1000 milliGRAM(s) Oral two times a day    MEDICATIONS  (PRN):  acetaminophen   Tablet .. 650 milliGRAM(s) Oral every 6 hours PRN Mild Pain (1 - 3)  lidocaine 2% Jelly 5 milliLiter(s) IntraUrethral daily PRN pain      Vital Signs Last 24 Hrs  T(C): 36.6 (24 May 2021 16:21), Max: 36.7 (24 May 2021 14:35)  T(F): 97.9 (24 May 2021 16:21), Max: 98 (24 May 2021 14:35)  HR: 87 (24 May 2021 16:21) (68 - 87)  BP: 123/80 (24 May 2021 16:21) (97/65 - 123/80)  BP(mean): --  RR: 18 (24 May 2021 16:21) (17 - 18)  SpO2: 95% (24 May 2021 16:21) (94% - 98%)    PHYSICAL EXAMINATION:  GENERAL: NAD, AAOx3, but has underlying cognitive impairement  HEAD: AT/NC  EYES: conjunctiva and sclera clear  NECK: supple, No JVD noted, Normal thyroid  CHEST/LUNG: CTABL; no rales, rhonchi, wheezing, or rubs  HEART: regular rate and rhythm; no murmurs, rubs, or gallops  ABDOMEN: soft, nontender, nondistended; Bowel sounds present  EXTREMITIES:  2+ Peripheral Pulses, No clubbing, cyanosis, or edema  SKIN: warm dry                          13.0   8.35  )-----------( 193      ( 24 May 2021 06:43 )             42.2     05-24    142  |  107  |  16  ----------------------------<  107<H>  4.4   |  28  |  0.88    Ca    8.6      24 May 2021 06:42  Phos  4.0     05-24  Mg     2.2     05-24    TPro  5.6<L>  /  Alb  2.7<L>  /  TBili  0.3  /  DBili  x   /  AST  9<L>  /  ALT  40  /  AlkPhos  75  05-24    LIVER FUNCTIONS - ( 24 May 2021 06:42 )  Alb: 2.7 g/dL / Pro: 5.6 g/dL / ALK PHOS: 75 U/L / ALT: 40 U/L DA / AST: 9 U/L / GGT: x                 COVID-19 PCR: NotDetec (23 May 2021 05:37)  COVID-19 PCR: NotDetec (02 Mar 2021 03:46)      CAPILLARY BLOOD GLUCOSE          RADIOLOGY & ADDITIONAL TESTS:

## 2021-05-24 NOTE — PROGRESS NOTE ADULT - ASSESSMENT
31 year old man with PMH of Mental restriction with developmental delay, ADHD, asthma, impulse disorder and mood disorder sent in from the Group Home on account of difficulty urinating / refusing to urinate due to the burning, UA positive.

## 2021-05-24 NOTE — PROGRESS NOTE ADULT - PROBLEM SELECTOR PLAN 1
- p/w severe burning with urination preventing him from urinating  - UA pos  - Ucx & Bcx neg  - s/p mckeon removal and ToV  - c/w pyridium, intrauretheral lidocaine, ceftriaxone  - c/w finasteride and flomax

## 2021-05-24 NOTE — BH CONSULTATION LIAISON ASSESSMENT NOTE - CURRENT MEDICATION
MEDICATIONS  (STANDING):  atorvastatin 10 milliGRAM(s) Oral at bedtime  cefTRIAXone   IVPB 1000 milliGRAM(s) IV Intermittent every 24 hours  cloNIDine 0.2 milliGRAM(s) Oral at bedtime  enoxaparin Injectable 40 milliGRAM(s) SubCutaneous daily  finasteride 5 milliGRAM(s) Oral daily  levothyroxine 50 MICROGram(s) Oral daily  melatonin 5 milliGRAM(s) Oral at bedtime  pantoprazole    Tablet 40 milliGRAM(s) Oral before breakfast  phenazopyridine 200 milliGRAM(s) Oral every 8 hours  sodium chloride 0.9%. 1000 milliLiter(s) (75 mL/Hr) IV Continuous <Continuous>  tamsulosin 0.4 milliGRAM(s) Oral at bedtime  valproic acid 1000 milliGRAM(s) Oral two times a day    MEDICATIONS  (PRN):  acetaminophen   Tablet .. 650 milliGRAM(s) Oral every 6 hours PRN Mild Pain (1 - 3)  lidocaine 2% Jelly 5 milliLiter(s) IntraUrethral daily PRN pain

## 2021-05-24 NOTE — BH CONSULTATION LIAISON ASSESSMENT NOTE - NSBHADMITCOUNSEL_PSY_A_CORE
diagnostic results/impressions and/or recommended studies/risks and benefits of treatment options/instructions for management, treatment and follow up/importance of adherence to chosen treatment/client/family/caregiver education

## 2021-05-24 NOTE — PROGRESS NOTE ADULT - ATTENDING COMMENTS
Patient seen and examined. Case discussed with Dr. Charles. Patient requesting mckeon out and that he is "no longer retaining." Noted to have some hematuria today but no clots. Discussed with urology and ok for TOV. Fill f/u TOV, can continue pyridium and intrauretheral lidocaine. Continue ceftriaxone and f/u blood and urine cultures. Appreciate psych recommendations. Patient denies SI and 1:1 can be dc'd. Thorazine PRN added for severe agitation per psychiatry recommendations. Remaining care as above.

## 2021-05-24 NOTE — BH CONSULTATION LIAISON ASSESSMENT NOTE - NSBHCONSULTRECOMMENDOTHER_PSY_A_CORE FT
-Observation level per nursing  -c/w home medications  -check VPA level, primary team can inform group home or patient's outpatient psychiatrist once level is available  -for acute agitation can give Thorazine 50mg PO/IM/IV q6H PRN, can repeat once in 30 minutes is no response  -no psychiatric contraindication for discharge once medically clear  -psychiatry will sign off now, call with questions  -primary team can provide info for AA meetings/outpatient rehab per patient request (though no documented h/o substance abuse)
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
## 2021-05-24 NOTE — PROGRESS NOTE ADULT - PROBLEM SELECTOR PLAN 3
- h/o mood disorder and impulsive behavior, on depakote and clonidine for impulsivity  - c/w home meds

## 2021-05-24 NOTE — BH CONSULTATION LIAISON ASSESSMENT NOTE - SUMMARY
32yo M, domiciled at [facility], PPH of intellectual disability, mood disorder, factitious disorder, past psych admissions, no known SA, hx of self injury (nonsuicidal), hx of aggression, no legal issues, admitted for UTI 2/2 urinary retention on Abx, psych consulted as patient endorsing SI last night due to breakup with ex gf 2 years ago. Patient is cooperative, on evaluation, denied acute SI/Hi/AVH, no overt psychosis/everett noted, no self destructive behavior or aggression noted since admission. Statement of SI appears to be attention seeking, behavioral in nature rather than manifestation of primary psychiatric decompensation. Patient appears to be at his psychiatric baseline and does not meet criteria for involuntary psychiatric admission, psychiatry will sign off now, no psych contraindication for discharge once medically cleared.

## 2021-05-24 NOTE — BH CONSULTATION LIAISON ASSESSMENT NOTE - NSBHCONSULTWORKUPLABSFT_PSY_ALL_CORE
can check depakote level, primary team can inform group home or outpatient psychiatrist of level once available

## 2021-05-25 ENCOUNTER — TRANSCRIPTION ENCOUNTER (OUTPATIENT)
Age: 31
End: 2021-05-25

## 2021-05-25 VITALS
DIASTOLIC BLOOD PRESSURE: 74 MMHG | SYSTOLIC BLOOD PRESSURE: 116 MMHG | HEART RATE: 79 BPM | OXYGEN SATURATION: 97 % | TEMPERATURE: 98 F | RESPIRATION RATE: 16 BRPM

## 2021-05-25 LAB
ALBUMIN SERPL ELPH-MCNC: 3 G/DL — LOW (ref 3.5–5)
ALP SERPL-CCNC: 85 U/L — SIGNIFICANT CHANGE UP (ref 40–120)
ALT FLD-CCNC: 32 U/L DA — SIGNIFICANT CHANGE UP (ref 10–60)
ANION GAP SERPL CALC-SCNC: 9 MMOL/L — SIGNIFICANT CHANGE UP (ref 5–17)
AST SERPL-CCNC: 11 U/L — SIGNIFICANT CHANGE UP (ref 10–40)
BASOPHILS # BLD AUTO: 0.03 K/UL — SIGNIFICANT CHANGE UP (ref 0–0.2)
BASOPHILS NFR BLD AUTO: 0.3 % — SIGNIFICANT CHANGE UP (ref 0–2)
BILIRUB SERPL-MCNC: 0.4 MG/DL — SIGNIFICANT CHANGE UP (ref 0.2–1.2)
BUN SERPL-MCNC: 17 MG/DL — SIGNIFICANT CHANGE UP (ref 7–18)
CALCIUM SERPL-MCNC: 8.9 MG/DL — SIGNIFICANT CHANGE UP (ref 8.4–10.5)
CHLORIDE SERPL-SCNC: 102 MMOL/L — SIGNIFICANT CHANGE UP (ref 96–108)
CO2 SERPL-SCNC: 27 MMOL/L — SIGNIFICANT CHANGE UP (ref 22–31)
CREAT SERPL-MCNC: 0.88 MG/DL — SIGNIFICANT CHANGE UP (ref 0.5–1.3)
EOSINOPHIL # BLD AUTO: 0.22 K/UL — SIGNIFICANT CHANGE UP (ref 0–0.5)
EOSINOPHIL NFR BLD AUTO: 2.5 % — SIGNIFICANT CHANGE UP (ref 0–6)
GLUCOSE SERPL-MCNC: 117 MG/DL — HIGH (ref 70–99)
HCT VFR BLD CALC: 43.6 % — SIGNIFICANT CHANGE UP (ref 39–50)
HGB BLD-MCNC: 13.5 G/DL — SIGNIFICANT CHANGE UP (ref 13–17)
IMM GRANULOCYTES NFR BLD AUTO: 0.7 % — SIGNIFICANT CHANGE UP (ref 0–1.5)
LYMPHOCYTES # BLD AUTO: 3.56 K/UL — HIGH (ref 1–3.3)
LYMPHOCYTES # BLD AUTO: 40.8 % — SIGNIFICANT CHANGE UP (ref 13–44)
MAGNESIUM SERPL-MCNC: 2.2 MG/DL — SIGNIFICANT CHANGE UP (ref 1.6–2.6)
MCHC RBC-ENTMCNC: 25.3 PG — LOW (ref 27–34)
MCHC RBC-ENTMCNC: 31 GM/DL — LOW (ref 32–36)
MCV RBC AUTO: 81.6 FL — SIGNIFICANT CHANGE UP (ref 80–100)
MONOCYTES # BLD AUTO: 0.49 K/UL — SIGNIFICANT CHANGE UP (ref 0–0.9)
MONOCYTES NFR BLD AUTO: 5.6 % — SIGNIFICANT CHANGE UP (ref 2–14)
NEUTROPHILS # BLD AUTO: 4.37 K/UL — SIGNIFICANT CHANGE UP (ref 1.8–7.4)
NEUTROPHILS NFR BLD AUTO: 50.1 % — SIGNIFICANT CHANGE UP (ref 43–77)
NRBC # BLD: 0 /100 WBCS — SIGNIFICANT CHANGE UP (ref 0–0)
PHOSPHATE SERPL-MCNC: 3.9 MG/DL — SIGNIFICANT CHANGE UP (ref 2.5–4.5)
PLATELET # BLD AUTO: 204 K/UL — SIGNIFICANT CHANGE UP (ref 150–400)
POTASSIUM SERPL-MCNC: 4.6 MMOL/L — SIGNIFICANT CHANGE UP (ref 3.5–5.3)
POTASSIUM SERPL-SCNC: 4.6 MMOL/L — SIGNIFICANT CHANGE UP (ref 3.5–5.3)
PROT SERPL-MCNC: 6.4 G/DL — SIGNIFICANT CHANGE UP (ref 6–8.3)
RBC # BLD: 5.34 M/UL — SIGNIFICANT CHANGE UP (ref 4.2–5.8)
RBC # FLD: 15.1 % — HIGH (ref 10.3–14.5)
SODIUM SERPL-SCNC: 138 MMOL/L — SIGNIFICANT CHANGE UP (ref 135–145)
T4 FREE SERPL-MCNC: 1.1 NG/DL — SIGNIFICANT CHANGE UP (ref 0.9–1.8)
WBC # BLD: 8.73 K/UL — SIGNIFICANT CHANGE UP (ref 3.8–10.5)
WBC # FLD AUTO: 8.73 K/UL — SIGNIFICANT CHANGE UP (ref 3.8–10.5)

## 2021-05-25 PROCEDURE — 99239 HOSP IP/OBS DSCHRG MGMT >30: CPT | Mod: GC

## 2021-05-25 RX ORDER — PHENAZOPYRIDINE HCL 100 MG
1 TABLET ORAL
Qty: 15 | Refills: 0
Start: 2021-05-25 | End: 2021-05-29

## 2021-05-25 RX ORDER — AZTREONAM 2 G
1 VIAL (EA) INJECTION
Qty: 10 | Refills: 0
Start: 2021-05-25 | End: 2021-05-29

## 2021-05-25 RX ADMIN — Medication 1000 MILLIGRAM(S): at 05:33

## 2021-05-25 RX ADMIN — Medication 200 MILLIGRAM(S): at 05:33

## 2021-05-25 RX ADMIN — Medication 50 MICROGRAM(S): at 05:33

## 2021-05-25 RX ADMIN — CEFTRIAXONE 100 MILLIGRAM(S): 500 INJECTION, POWDER, FOR SOLUTION INTRAMUSCULAR; INTRAVENOUS at 08:26

## 2021-05-25 RX ADMIN — PANTOPRAZOLE SODIUM 40 MILLIGRAM(S): 20 TABLET, DELAYED RELEASE ORAL at 06:12

## 2021-05-25 NOTE — DISCHARGE NOTE PROVIDER - HOSPITAL COURSE
Patient is a 31 year old man, with PMH of Mental restriction with developmental delay, ADHD, asthma, impulse disorder and mood disorder sent in from the Group Home on account of difficulty urinating / refusing to urinate due to the burning. Patient is admitted for UTI with urinary retention. Sarabia catheter was placed. Patient was treated with ceftriaxone, pyridium and intraurethral lidocaine. Patient was also treated with finasteride and flomax for underlying prostate enlargement.    Patient has a history of mood disorder, which includes impulsive behavior, on Depakote and clonidine at home. Patient was continued to treated with home medications    Patient has a history of hypothyroidism. TSH noted to be 5.22. Patient was continued to treated with home-dose levothyroxine.    Patient has a history of GERD, on Protonix. Patient was treated with home medications    Patient has history of HLD, on Lipitor at home. Patient was continued to treated with home medications.

## 2021-05-25 NOTE — DISCHARGE NOTE PROVIDER - NSDCCPCAREPLAN_GEN_ALL_CORE_FT
PRINCIPAL DISCHARGE DIAGNOSIS  Diagnosis: Acute cystitis without hematuria  Assessment and Plan of Treatment: You presented with difficulty urinating and refusing to urinate due to the burning pain. You were admitted for UTI with urinary retention.   - Sarabia catheter was placed, and removed during the admission  - you were treated with ceftriaxone, pyridium and intraurethral lidocaine.   - you were also treated with finasteride and flomax for underlying prostate enlargement  - you are being discharged with pyridium and Bactrim for another 5 days. Please complete the medications as prescribed.        SECONDARY DISCHARGE DIAGNOSES  Diagnosis: Hypothyroid  Assessment and Plan of Treatment: You have a history of hypothyroidism. Your TSH noted to be 5.22. You were continued to treated with home-dose levothyroxine.    Diagnosis: Hyperlipidemia  Assessment and Plan of Treatment: You have a history of HLD, on statin at home. You were continued to treated with home medication.    Diagnosis: GERD (gastroesophageal reflux disease)  Assessment and Plan of Treatment: You have a history of GERD, on Protonix. You were treated with home medication.      Diagnosis: Mood disorder  Assessment and Plan of Treatment: You have a history of mood disorder, which includes impulsive behavior, on Depakote and clonidine at home. You were continued to treated with home medications

## 2021-05-25 NOTE — DISCHARGE NOTE NURSING/CASE MANAGEMENT/SOCIAL WORK - NSPROEXTENSIONSOFSELF_GEN_A_NUR
"Returned Pt's phone call. No answer. Left VM for pt to call back. (Pt left message withMariella narayanan this afternoon, \"waiting for a call back from nurse in regards the miscarriage she is experiencing\")   "
none

## 2021-05-25 NOTE — DISCHARGE NOTE PROVIDER - NSDCMRMEDTOKEN_GEN_ALL_CORE_FT
Bactrim  mg-160 mg oral tablet: 1 tab(s) orally 2 times a day   cloNIDine 0.1 mg oral tablet: 2 tab(s) orally once a day (at bedtime)  Depakene 250 mg/5 mL oral liquid: 20 milliliter(s) orally 2 times a day  finasteride 5 mg oral tablet: 1 tab(s) orally once a day  levothyroxine 50 mcg (0.05 mg) oral tablet: 1 tab(s) orally once a day  pantoprazole 20 mg oral delayed release tablet: 1 tab(s) orally once a day  phenazopyridine 200 mg oral tablet: 1 tab(s) orally every 8 hours  pravastatin 20 mg oral tablet: 1 tab(s) orally once a day  tamsulosin 0.4 mg oral capsule: 1 cap(s) orally once a day (at bedtime)  Vitamin D2 50,000 intl units (1.25 mg) oral capsule: 1 cap(s) orally once a week

## 2021-05-25 NOTE — DISCHARGE NOTE NURSING/CASE MANAGEMENT/SOCIAL WORK - PATIENT PORTAL LINK FT
You can access the FollowMyHealth Patient Portal offered by [facility] by registering at the following website: [email or web address]. By joining [facility]’s FollowMyHealth portal, you will also be able to view your health information using other applications (apps) compatible with our system.

## 2021-05-25 NOTE — DISCHARGE NOTE PROVIDER - ATTENDING COMMENTS
Patient seen and examined on 5/25. Case discussed with Dr. Charles.   No complaints. Medically stable.    OE  NAD  RRR s1s2 no murmur  clear lungs  Soft, NT, ND, +BS  No pedal edema    Imp  Acute cystitis without hematuria  discharged on bactrim to complete the course of antibiotics  Hypothyroidism  HLD  GERD  Mood disorder Patient seen and examined on 5/25. Case discussed with Dr. Charles.   No complaints. Medically stable.    OE  NAD  RRR s1s2 no murmur  clear lungs  Soft, NT, ND, +BS  No pedal edema    Imp  Acute cystitis without hematuria  discharged on bactrim to complete the course of antibiotics  Hypothyroidism  Urinary retention s/p mckeon insertion, passed TOV the following day  HLD  GERD  Mood disorder

## 2021-05-28 LAB
CULTURE RESULTS: SIGNIFICANT CHANGE UP
CULTURE RESULTS: SIGNIFICANT CHANGE UP
SPECIMEN SOURCE: SIGNIFICANT CHANGE UP
SPECIMEN SOURCE: SIGNIFICANT CHANGE UP

## 2021-06-22 PROCEDURE — 87040 BLOOD CULTURE FOR BACTERIA: CPT

## 2021-06-22 PROCEDURE — 83615 LACTATE (LD) (LDH) ENZYME: CPT

## 2021-06-22 PROCEDURE — 83605 ASSAY OF LACTIC ACID: CPT

## 2021-06-22 PROCEDURE — 81001 URINALYSIS AUTO W/SCOPE: CPT

## 2021-06-22 PROCEDURE — 83735 ASSAY OF MAGNESIUM: CPT

## 2021-06-22 PROCEDURE — 82746 ASSAY OF FOLIC ACID SERUM: CPT

## 2021-06-22 PROCEDURE — 82306 VITAMIN D 25 HYDROXY: CPT

## 2021-06-22 PROCEDURE — 83036 HEMOGLOBIN GLYCOSYLATED A1C: CPT

## 2021-06-22 PROCEDURE — 86140 C-REACTIVE PROTEIN: CPT

## 2021-06-22 PROCEDURE — 87635 SARS-COV-2 COVID-19 AMP PRB: CPT

## 2021-06-22 PROCEDURE — 80061 LIPID PANEL: CPT

## 2021-06-22 PROCEDURE — 82607 VITAMIN B-12: CPT

## 2021-06-22 PROCEDURE — 87086 URINE CULTURE/COLONY COUNT: CPT

## 2021-06-22 PROCEDURE — 99285 EMERGENCY DEPT VISIT HI MDM: CPT | Mod: 25

## 2021-06-22 PROCEDURE — 84100 ASSAY OF PHOSPHORUS: CPT

## 2021-06-22 PROCEDURE — 84439 ASSAY OF FREE THYROXINE: CPT

## 2021-06-22 PROCEDURE — 85025 COMPLETE CBC W/AUTO DIFF WBC: CPT

## 2021-06-22 PROCEDURE — 80053 COMPREHEN METABOLIC PANEL: CPT

## 2021-06-22 PROCEDURE — 86769 SARS-COV-2 COVID-19 ANTIBODY: CPT

## 2021-06-22 PROCEDURE — 96374 THER/PROPH/DIAG INJ IV PUSH: CPT

## 2021-06-22 PROCEDURE — 85652 RBC SED RATE AUTOMATED: CPT

## 2021-06-22 PROCEDURE — 80048 BASIC METABOLIC PNL TOTAL CA: CPT

## 2021-06-22 PROCEDURE — 36415 COLL VENOUS BLD VENIPUNCTURE: CPT

## 2021-06-22 PROCEDURE — 84443 ASSAY THYROID STIM HORMONE: CPT

## 2021-06-22 PROCEDURE — 85027 COMPLETE CBC AUTOMATED: CPT

## 2021-06-23 NOTE — ED ADULT NURSE NOTE - DISCHARGE DATE/TIME
Enrique Mae, ATC   01-Jul-2020 00:37

## 2021-07-10 ENCOUNTER — EMERGENCY (EMERGENCY)
Facility: HOSPITAL | Age: 31
LOS: 1 days | Discharge: HOME | End: 2021-07-10
Attending: STUDENT IN AN ORGANIZED HEALTH CARE EDUCATION/TRAINING PROGRAM
Payer: MEDICAID

## 2021-07-10 VITALS
TEMPERATURE: 98 F | HEIGHT: 71 IN | RESPIRATION RATE: 17 BRPM | DIASTOLIC BLOOD PRESSURE: 69 MMHG | WEIGHT: 257.06 LBS | OXYGEN SATURATION: 98 % | SYSTOLIC BLOOD PRESSURE: 102 MMHG | HEART RATE: 69 BPM

## 2021-07-10 LAB
ALBUMIN SERPL ELPH-MCNC: 3.4 G/DL — LOW (ref 3.5–5)
ALP SERPL-CCNC: 103 U/L — SIGNIFICANT CHANGE UP (ref 40–120)
ALT FLD-CCNC: 44 U/L DA — SIGNIFICANT CHANGE UP (ref 10–60)
ANION GAP SERPL CALC-SCNC: 8 MMOL/L — SIGNIFICANT CHANGE UP (ref 5–17)
APPEARANCE UR: CLEAR — SIGNIFICANT CHANGE UP
AST SERPL-CCNC: 23 U/L — SIGNIFICANT CHANGE UP (ref 10–40)
BASOPHILS # BLD AUTO: 0.02 K/UL — SIGNIFICANT CHANGE UP (ref 0–0.2)
BASOPHILS NFR BLD AUTO: 0.3 % — SIGNIFICANT CHANGE UP (ref 0–2)
BILIRUB SERPL-MCNC: 0.2 MG/DL — SIGNIFICANT CHANGE UP (ref 0.2–1.2)
BILIRUB UR-MCNC: NEGATIVE — SIGNIFICANT CHANGE UP
BUN SERPL-MCNC: 16 MG/DL — SIGNIFICANT CHANGE UP (ref 7–18)
CALCIUM SERPL-MCNC: 8.9 MG/DL — SIGNIFICANT CHANGE UP (ref 8.4–10.5)
CHLORIDE SERPL-SCNC: 108 MMOL/L — SIGNIFICANT CHANGE UP (ref 96–108)
CO2 SERPL-SCNC: 25 MMOL/L — SIGNIFICANT CHANGE UP (ref 22–31)
COLOR SPEC: YELLOW — SIGNIFICANT CHANGE UP
CREAT SERPL-MCNC: 0.85 MG/DL — SIGNIFICANT CHANGE UP (ref 0.5–1.3)
DIFF PNL FLD: NEGATIVE — SIGNIFICANT CHANGE UP
EOSINOPHIL # BLD AUTO: 0.21 K/UL — SIGNIFICANT CHANGE UP (ref 0–0.5)
EOSINOPHIL NFR BLD AUTO: 2.9 % — SIGNIFICANT CHANGE UP (ref 0–6)
ETHANOL SERPL-MCNC: <3 MG/DL — SIGNIFICANT CHANGE UP (ref 0–10)
GLUCOSE SERPL-MCNC: 113 MG/DL — HIGH (ref 70–99)
GLUCOSE UR QL: NEGATIVE — SIGNIFICANT CHANGE UP
HCT VFR BLD CALC: 40.1 % — SIGNIFICANT CHANGE UP (ref 39–50)
HGB BLD-MCNC: 12.6 G/DL — LOW (ref 13–17)
IMM GRANULOCYTES NFR BLD AUTO: 0.4 % — SIGNIFICANT CHANGE UP (ref 0–1.5)
KETONES UR-MCNC: NEGATIVE — SIGNIFICANT CHANGE UP
LEUKOCYTE ESTERASE UR-ACNC: NEGATIVE — SIGNIFICANT CHANGE UP
LYMPHOCYTES # BLD AUTO: 2.03 K/UL — SIGNIFICANT CHANGE UP (ref 1–3.3)
LYMPHOCYTES # BLD AUTO: 28.3 % — SIGNIFICANT CHANGE UP (ref 13–44)
MCHC RBC-ENTMCNC: 24.3 PG — LOW (ref 27–34)
MCHC RBC-ENTMCNC: 31.4 GM/DL — LOW (ref 32–36)
MCV RBC AUTO: 77.4 FL — LOW (ref 80–100)
MONOCYTES # BLD AUTO: 0.57 K/UL — SIGNIFICANT CHANGE UP (ref 0–0.9)
MONOCYTES NFR BLD AUTO: 7.9 % — SIGNIFICANT CHANGE UP (ref 2–14)
NEUTROPHILS # BLD AUTO: 4.32 K/UL — SIGNIFICANT CHANGE UP (ref 1.8–7.4)
NEUTROPHILS NFR BLD AUTO: 60.2 % — SIGNIFICANT CHANGE UP (ref 43–77)
NITRITE UR-MCNC: NEGATIVE — SIGNIFICANT CHANGE UP
NRBC # BLD: 0 /100 WBCS — SIGNIFICANT CHANGE UP (ref 0–0)
PH UR: 6.5 — SIGNIFICANT CHANGE UP (ref 5–8)
PLATELET # BLD AUTO: 280 K/UL — SIGNIFICANT CHANGE UP (ref 150–400)
POTASSIUM SERPL-MCNC: 3.9 MMOL/L — SIGNIFICANT CHANGE UP (ref 3.5–5.3)
POTASSIUM SERPL-SCNC: 3.9 MMOL/L — SIGNIFICANT CHANGE UP (ref 3.5–5.3)
PROT SERPL-MCNC: 6.6 G/DL — SIGNIFICANT CHANGE UP (ref 6–8.3)
PROT UR-MCNC: NEGATIVE — SIGNIFICANT CHANGE UP
RBC # BLD: 5.18 M/UL — SIGNIFICANT CHANGE UP (ref 4.2–5.8)
RBC # FLD: 14.1 % — SIGNIFICANT CHANGE UP (ref 10.3–14.5)
SODIUM SERPL-SCNC: 141 MMOL/L — SIGNIFICANT CHANGE UP (ref 135–145)
SP GR SPEC: 1.01 — SIGNIFICANT CHANGE UP (ref 1.01–1.02)
UROBILINOGEN FLD QL: NEGATIVE — SIGNIFICANT CHANGE UP
WBC # BLD: 7.18 K/UL — SIGNIFICANT CHANGE UP (ref 3.8–10.5)
WBC # FLD AUTO: 7.18 K/UL — SIGNIFICANT CHANGE UP (ref 3.8–10.5)

## 2021-07-10 PROCEDURE — 90792 PSYCH DIAG EVAL W/MED SRVCS: CPT | Mod: 95

## 2021-07-10 PROCEDURE — 99284 EMERGENCY DEPT VISIT MOD MDM: CPT

## 2021-07-10 RX ORDER — SODIUM CHLORIDE 9 MG/ML
1000 INJECTION INTRAMUSCULAR; INTRAVENOUS; SUBCUTANEOUS ONCE
Refills: 0 | Status: COMPLETED | OUTPATIENT
Start: 2021-07-10 | End: 2021-07-10

## 2021-07-10 RX ORDER — MIDAZOLAM HYDROCHLORIDE 1 MG/ML
2 INJECTION, SOLUTION INTRAMUSCULAR; INTRAVENOUS ONCE
Refills: 0 | Status: DISCONTINUED | OUTPATIENT
Start: 2021-07-10 | End: 2021-07-10

## 2021-07-10 RX ADMIN — SODIUM CHLORIDE 1000 MILLILITER(S): 9 INJECTION INTRAMUSCULAR; INTRAVENOUS; SUBCUTANEOUS at 17:59

## 2021-07-10 NOTE — SBIRT NOTE ADULT - NSSBIRTALCPOSREINDET_GEN_A_CORE
Pt admitted to occasional alcohol intake and claimed not to have any problems with it. Positive reinforcement provided via counseling/education re alcohol.

## 2021-07-10 NOTE — ED BEHAVIORAL HEALTH ASSESSMENT NOTE - CURRENT MEDICATION
· 	Tylenol 325 mg oral tablet: 2 tab(s) orally every 4 hours   · 	ibuprofen 600 mg oral tablet: 1 tab(s) orally every 6 hours   · 	tamsulosin 0.4 mg oral capsule: 1 cap(s) orally once a day (at bedtime)  · 	finasteride 5 mg oral tablet: 1 tab(s) orally once a day  · 	amantadine 100 mg oral tablet: 1 tab(s) orally 2 times a day  · 	baclofen 10 mg oral tablet: 1 tab(s) orally 3 times a day  · 	Dulera 100 mcg-5 mcg/inh inhalation aerosol: 2 puff(s) inhaled 2 times a day  · 	FLUoxetine 10 mg oral capsule: 1 cap(s) orally once a day  · 	FLUoxetine 20 mg oral capsule: 1 cap(s) orally once a day  · 	gabapentin 800 mg oral tablet: 1 tab(s) orally 3 times a day  · 	levothyroxine 50 mcg (0.05 mg) oral tablet: 1 tab(s) orally once a day  · 	pantoprazole 40 mg oral delayed release tablet: 1 tab(s) orally once a day  · 	polyethylene glycol 3350 oral powder for reconstitution: 1 each orally once a day  · 	simvastatin 5 mg oral tablet: 1 tab(s) orally once a day (at bedtime)  · 	Vitamin D2 50,000 intl units (1.25 mg) oral capsule: 1 cap(s) orally once a week  · 	LORazepam 1 mg oral tablet: 1 tab(s) orally 2 times a day  · 	Cogentin 1 mg oral tablet: 1 tab(s) orally 2 times a day  · 	cloNIDine 0.1 mg oral tablet: 1 tab(s) orally once a day (at bedtime)  · 	OXcarbazepine 300 mg oral tablet: 1 tab(s) orally 2 times a day  · 	lithium carbonate: 150 milligram(s) orally 2 times a day  · 	Depakote 500 mg oral delayed release tablet: 1 tab(s) orally 2 times a day  · 	perphenazine 4 mg oral tablet: 1 tab(s) orally 3 times a day per April ER visit: (no med list on current visit 7/10/21 to update regimen)  · 	Tylenol 325 mg oral tablet: 2 tab(s) orally every 4 hours   · 	ibuprofen 600 mg oral tablet: 1 tab(s) orally every 6 hours   · 	tamsulosin 0.4 mg oral capsule: 1 cap(s) orally once a day (at bedtime)  · 	finasteride 5 mg oral tablet: 1 tab(s) orally once a day  · 	amantadine 100 mg oral tablet: 1 tab(s) orally 2 times a day  · 	baclofen 10 mg oral tablet: 1 tab(s) orally 3 times a day  · 	Dulera 100 mcg-5 mcg/inh inhalation aerosol: 2 puff(s) inhaled 2 times a day  · 	FLUoxetine 10 mg oral capsule: 1 cap(s) orally once a day  · 	FLUoxetine 20 mg oral capsule: 1 cap(s) orally once a day  · 	gabapentin 800 mg oral tablet: 1 tab(s) orally 3 times a day  · 	levothyroxine 50 mcg (0.05 mg) oral tablet: 1 tab(s) orally once a day  · 	pantoprazole 40 mg oral delayed release tablet: 1 tab(s) orally once a day  · 	polyethylene glycol 3350 oral powder for reconstitution: 1 each orally once a day  · 	simvastatin 5 mg oral tablet: 1 tab(s) orally once a day (at bedtime)  · 	Vitamin D2 50,000 intl units (1.25 mg) oral capsule: 1 cap(s) orally once a week  · 	LORazepam 1 mg oral tablet: 1 tab(s) orally 2 times a day  · 	Cogentin 1 mg oral tablet: 1 tab(s) orally 2 times a day  · 	cloNIDine 0.1 mg oral tablet: 1 tab(s) orally once a day (at bedtime)  · 	OXcarbazepine 300 mg oral tablet: 1 tab(s) orally 2 times a day  · 	lithium carbonate: 150 milligram(s) orally 2 times a day  · 	Depakote 500 mg oral delayed release tablet: 1 tab(s) orally 2 times a day  · 	perphenazine 4 mg oral tablet: 1 tab(s) orally 3 times a day

## 2021-07-10 NOTE — ED PROVIDER NOTE - PHYSICAL EXAMINATION
Sohrawardy:   VS reviewed  NAD, not ill-appearing  aaox3  nc/at, neck rom normal, supple  rrr, s1s2, no jvd, no pitting edema  normal resp effort  lungs ctab, no w/r/r  abdomen soft, nd/nt, no suprapubic tenderness, no CVA tenderness  no rash  cn intact, strength 5/5, sensation intact, no neuro deficits

## 2021-07-10 NOTE — ED BEHAVIORAL HEALTH ASSESSMENT NOTE - SUMMARY
31yo M, domiciled at [facility] group [place], PPH of intellectual disability, mood disorder, factitious disorder, past psych admissions, no known SA, hx of self injury (nonsuicidal), hx of aggression, no legal issues, BIBEMS for urinary retention, psych consulted as patient reportedly made homicidal statement in context of his pain . Previously seen for assessment by psych in april 2021.     Patient currently does not present with any acute mood or psychotic disorder. Does not present with any thoughts of self harm, SI, harm towards others, HI.  Homicidal statements if any at group home likely occurred in context of agitation due to pain or effort to be transported to ER.  There is no other clinical evidence or history suggestive of a primary psychiatric concern.  Currently there is no acute safety risk. Currently does not meet criteria for involuntary psychiatric hospitalization at this time.

## 2021-07-10 NOTE — ED PROVIDER NOTE - PATIENT PORTAL LINK FT
You can access the FollowMyHealth Patient Portal offered by [facility] by registering at the following website: [email or web address]. By joining [facility]’s FollowMyHealth portal, you will also be able to view your health information using other applications (apps) compatible with our system.

## 2021-07-10 NOTE — ED PROVIDER NOTE - OBJECTIVE STATEMENT
30 y/o M, from group [facility], with a significant PMHx of HTN, HLD, autism, mood disorder, factitious disorder, presents to the ED with complaint of urinary retention. Patient states he has been unable to urinate today, endorsing suprapubic pain, which has occurred before and needs a Sarabia. Denies fever, chills, chest pain, shortness of breath, other abdominal pain, back pain or flank pain. Of note, [facility] called and the nursing administrator stated that the patient was agitated, endorsing homicidal ideation, violent and combative, which is why EMS was called. Patient urinated normally today and has normal PO intake. Patient was at [facility] yesterday for same complaint and was discharged with a negative workup. Group home states they are more concerned with the patient's psych issues and otherwise has been physically at baseline.
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
## 2021-07-10 NOTE — ED PROVIDER NOTE - NSFOLLOWUPINSTRUCTIONS_ED_ALL_ED_FT
Adjustment Disorder, Adult      Adjustment disorder is a group of symptoms that can develop after a stressful life event, such as the loss of a job or serious physical illness. The symptoms can affect how you feel, think, and act. They may interfere with your relationships.    Adjustment disorder increases your risk of suicide and substance abuse. If this disorder is not managed early, it can develop into a more serious condition, such as major depressive disorder or post-traumatic stress disorder.      What are the causes?    This condition happens when you have trouble recovering from or coping with a stressful life event.      What increases the risk?  You are more likely to develop this condition if:  •You have had depression or anxiety.      •You are being treated for a long-term (chronic) illness.      •You are being treated for an illness that cannot be cured (terminal illness).      •You have a family history of mental illness.        What are the signs or symptoms?  Symptoms of this condition include:  •Extreme trouble doing daily tasks, such as going to work.      •Sadness, depression, or crying spells.      •Worrying a lot.      •Loss of enjoyment.      •Change in appetite or weight.      •Feelings of loss or hopelessness.      •Thoughts of suicide.      •Anxiety, worry, or nervousness.      •Trouble sleeping.      •Avoiding family and friends.      •Fighting or vandalism.      •Complaining of feeling sick without being ill.      •Feeling dazed or disconnected.      •Nightmares.      •Trouble sleeping.      •Irritability.      •Reckless driving.      •Poor work performance.      •Ignoring bills.      Symptoms of this condition start within three months of the stressful event. They do not last more than six months, unless the stressful circumstances last longer. Normal grieving after the death of a loved one is not a symptom of this condition.      How is this diagnosed?    To diagnose this condition, your health care provider will ask about what has happened in your life and how it has affected you. He or she may also ask about your medical history and your use of medicines, alcohol, and other substances. Your health care provider may do a physical exam and order lab tests or other studies. You may be referred to a mental health specialist.      How is this treated?  Treatment options for this condition include:  •Counseling or talk therapy. Talk therapy is usually provided by mental health specialists.      •Medicines. Certain medicines may help with depression, anxiety, and sleep.      •Support groups. These offer emotional support, advice, and guidance. They are made up of people who have had similar experiences.      •Observation and time. This is sometimes called "watchful waiting." In this treatment, health care providers monitor your health and behavior without other treatment. Adjustment disorder sometimes gets better on its own with time.        Follow these instructions at home:    •Take over-the-counter and prescription medicines only as told by your health care provider.      •Keep all follow-up visits as told by your health care provider. This is important.        Contact a health care provider if:    •Your symptoms do not improve in six months.      •Your symptoms get worse.        Get help right away if:    •You have serious thoughts about hurting yourself or someone else.    If you ever feel like you may hurt yourself or others, or have thoughts about taking your own life, get help right away. You can go to your nearest emergency department or call:   • Your local emergency services (911 in the [place]       • A suicide crisis helpline, such as the National Suicide Prevention Lifeline at 1-906.865.6206. This is open 24 hours a day.         Summary    •Adjustment disorder is a group of symptoms that can develop after a stressful life event, such as the loss of a job or serious physical illness. The symptoms can affect how you feel, think, and act. They may interfere with your relationships.      •Symptoms of this condition start within three months of the stressful event. They do not last more than six months, unless the stressful circumstances last longer.      •Treatment may include talk therapy, medicines, participation in a support group, or observation to see if symptoms improve.      •Contact your health care provider if your symptoms get worse or do not improve in six months.      •If you ever feel like you may hurt yourself or others, or have thoughts about taking your own life, get help right away.      This information is not intended to replace advice given to you by your health care provider. Make sure you discuss any questions you have with your health care provider.

## 2021-07-10 NOTE — ED BEHAVIORAL HEALTH ASSESSMENT NOTE - DESCRIPTION
see hpi As per  collateral note.    COVID Screen:  Denies COVID positive contact in the last 10 days  Denies travel out of state in the last 10 days  Denies COVID test in the last 90 days  Denies antibody testing  Denies COVID vaccine lives in [facility]

## 2021-07-10 NOTE — ED PROVIDER NOTE - CLINICAL SUMMARY MEDICAL DECISION MAKING FREE TEXT BOX
30 y/o M with an extensive psych history and factitious disorder presents with complaint of urinary retention. Patient was at [facility] yesterday with urinary retention and negative workup. No retention per the group home employee and urinating normally today with normal PO intake. Patient however with possible HI, agitation and combativeness. Will perform labs, bladder scan, psych and reassess.

## 2021-07-10 NOTE — ED BEHAVIORAL HEALTH ASSESSMENT NOTE - SAFETY PLAN ADDT'L DETAILS
Safety plan discussed with.../Education provided regarding environmental safety / lethal means restriction/Provision of National Suicide Prevention Lifeline 3-898-196-CVSF (5400)

## 2021-07-10 NOTE — ED PROVIDER NOTE - PROGRESS NOTE DETAILS
Alcohol negative. Telepsych consult placed. Spoke with telespych attending and will need consult for SI and HI, although patient not endorsing it currently. Telespych will obtain collateral from group home. Will endorse to overnight attending. Pt received as a sign out from Dr. Pichardo, pending telepsych evaluation. Telepsych Dr. Mccurdy reports patient is psychiatrically cleared and can follow up with his own psychiatrist. Pt likely has adjustment disorder w/ disturbance of conduct and secondary gain. group home staff here and patient agrressive, saying he will punch her to initially she was not willing to take him home, Psych consulted, recommended klonipin 1 mg, then patient calmed down, states hes ready to go home, staff member states she feels safe taking him home at this time

## 2021-07-10 NOTE — ED PROVIDER NOTE - NS ED ROS FT
(+) urinary retention (per group employee, patient urinating normally)  (-) fevers, chills  (-) dizziness, lightheadedness, vision changes  (-) neck pain, stiffness  (-) cp, palpitations  (-) sob, cough, hemoptysis  (-) abd pain, n/v/d/c   (-) back pain  (-) weakness, paresthesias

## 2021-07-10 NOTE — ED BEHAVIORAL HEALTH ASSESSMENT NOTE - HPI (INCLUDE ILLNESS QUALITY, SEVERITY, DURATION, TIMING, CONTEXT, MODIFYING FACTORS, ASSOCIATED SIGNS AND SYMPTOMS)
29yo M, domiciled at [facility], PPH of intellectual disability, mood disorder, factitious disorder, past psych admissions, no known SA, hx of self injury (nonsuicidal), hx of aggression, no legal issues, BIBEMS for urinary retention, psych consulted as patient reportedly made homicidal statement in context of his pain . Previously seen for assessment by psych in april 2021.     On interview, patient denies feeling depressed or anhedonic. He denies any current thoughts of self harm or SI. Throughout interview there were no mood or psychotic symptoms. Presented linear, engaged, asking for needs and future oriented.  Denies making any statements related to SI/HI.  Repeatedly states wish to be admitted to alleviate urinary pain.  Does not wish for discharge, since he will "come right back".      Patient reports no longer being on depakote    ROS: denies psychosis 31yo M, domiciled at [facility], PPH of intellectual disability, mood disorder, factitious disorder, past psych admissions, no known SA, hx of self injury (nonsuicidal), hx of aggression, no legal issues, BIBEMS for urinary retention, psych consulted as patient reportedly made homicidal statement in context of his pain . Previously seen for assessment by psych in april 2021.     On interview, patient denies feeling depressed or anhedonic. He denies any current thoughts of self harm or SI. Throughout interview there were no mood or psychotic symptoms. Presented linear, engaged, asking for needs and future oriented.  Denies making any statements related to SI/HI.  Repeatedly states wish to be admitted to alleviate urinary pain.  Does not wish for discharge, since he will "come right back".      Patient reports no longer being on depakote.    ROS: denies psychosis    See  note for details on patient baseline of frequent ER visits and provocative statements.

## 2021-07-11 PROBLEM — F84.0 AUTISTIC DISORDER: Chronic | Status: ACTIVE | Noted: 2021-05-23

## 2021-07-11 LAB
CULTURE RESULTS: NO GROWTH — SIGNIFICANT CHANGE UP
SPECIMEN SOURCE: SIGNIFICANT CHANGE UP

## 2021-07-11 PROCEDURE — 81003 URINALYSIS AUTO W/O SCOPE: CPT

## 2021-07-11 PROCEDURE — 36415 COLL VENOUS BLD VENIPUNCTURE: CPT

## 2021-07-11 PROCEDURE — 80053 COMPREHEN METABOLIC PANEL: CPT

## 2021-07-11 PROCEDURE — 96375 TX/PRO/DX INJ NEW DRUG ADDON: CPT

## 2021-07-11 PROCEDURE — 96374 THER/PROPH/DIAG INJ IV PUSH: CPT

## 2021-07-11 PROCEDURE — 87086 URINE CULTURE/COLONY COUNT: CPT

## 2021-07-11 PROCEDURE — 85025 COMPLETE CBC W/AUTO DIFF WBC: CPT

## 2021-07-11 PROCEDURE — 80307 DRUG TEST PRSMV CHEM ANLYZR: CPT

## 2021-07-11 PROCEDURE — 99284 EMERGENCY DEPT VISIT MOD MDM: CPT | Mod: 25

## 2021-07-11 RX ADMIN — MIDAZOLAM HYDROCHLORIDE 2 MILLIGRAM(S): 1 INJECTION, SOLUTION INTRAMUSCULAR; INTRAVENOUS at 00:05

## 2021-07-11 RX ADMIN — Medication 2 MILLIGRAM(S): at 00:05

## 2021-07-11 NOTE — ED ADULT NURSE REASSESSMENT NOTE - NS ED NURSE REASSESS COMMENT FT1
at 12:00am pt become agitated demanding to be discharge and go home by himself. pt was medicated with ativan 2mg IM and versed 2mg IM as Md order

## 2021-07-14 LAB
AMPHET UR-MCNC: NEGATIVE — SIGNIFICANT CHANGE UP
BARBITURATES, URINE.: NEGATIVE — SIGNIFICANT CHANGE UP
BENZODIAZ UR-MCNC: NEGATIVE — SIGNIFICANT CHANGE UP
COCAINE METAB.OTHER UR-MCNC: NEGATIVE — SIGNIFICANT CHANGE UP
CREATININE, URINE THERAPEUTIC: 123.9 MG/DL — SIGNIFICANT CHANGE UP
METHADONE UR-MCNC: NEGATIVE — SIGNIFICANT CHANGE UP
METHAQUALONE UR QL: NEGATIVE — SIGNIFICANT CHANGE UP
METHAQUALONE UR-MCNC: NEGATIVE — SIGNIFICANT CHANGE UP
OPIATES UR-MCNC: NEGATIVE — SIGNIFICANT CHANGE UP
PCP UR-MCNC: NEGATIVE — SIGNIFICANT CHANGE UP
PROPOXYPH UR QL: NEGATIVE — SIGNIFICANT CHANGE UP
THC UR QL: NEGATIVE — SIGNIFICANT CHANGE UP

## 2021-07-27 ENCOUNTER — TRANSCRIPTION ENCOUNTER (OUTPATIENT)
Age: 31
End: 2021-07-27

## 2021-08-03 ENCOUNTER — INPATIENT (INPATIENT)
Facility: HOSPITAL | Age: 31
LOS: 0 days | Discharge: HOME | DRG: 726 | End: 2021-08-04
Attending: UROLOGY | Admitting: UROLOGY
Payer: MEDICAID

## 2021-08-03 VITALS
HEART RATE: 67 BPM | RESPIRATION RATE: 18 BRPM | HEIGHT: 71 IN | DIASTOLIC BLOOD PRESSURE: 69 MMHG | OXYGEN SATURATION: 96 % | TEMPERATURE: 98 F | SYSTOLIC BLOOD PRESSURE: 156 MMHG

## 2021-08-03 DIAGNOSIS — R33.9 RETENTION OF URINE, UNSPECIFIED: ICD-10-CM

## 2021-08-03 LAB
ANION GAP SERPL CALC-SCNC: 6 MMOL/L — SIGNIFICANT CHANGE UP (ref 5–17)
APPEARANCE UR: CLEAR — SIGNIFICANT CHANGE UP
BASOPHILS # BLD AUTO: 0.03 K/UL — SIGNIFICANT CHANGE UP (ref 0–0.2)
BASOPHILS NFR BLD AUTO: 0.5 % — SIGNIFICANT CHANGE UP (ref 0–2)
BILIRUB UR-MCNC: NEGATIVE — SIGNIFICANT CHANGE UP
BUN SERPL-MCNC: 12 MG/DL — SIGNIFICANT CHANGE UP (ref 7–18)
CALCIUM SERPL-MCNC: 8.9 MG/DL — SIGNIFICANT CHANGE UP (ref 8.4–10.5)
CHLORIDE SERPL-SCNC: 108 MMOL/L — SIGNIFICANT CHANGE UP (ref 96–108)
CO2 SERPL-SCNC: 27 MMOL/L — SIGNIFICANT CHANGE UP (ref 22–31)
COLOR SPEC: YELLOW — SIGNIFICANT CHANGE UP
CREAT SERPL-MCNC: 0.78 MG/DL — SIGNIFICANT CHANGE UP (ref 0.5–1.3)
DIFF PNL FLD: NEGATIVE — SIGNIFICANT CHANGE UP
EOSINOPHIL # BLD AUTO: 0.22 K/UL — SIGNIFICANT CHANGE UP (ref 0–0.5)
EOSINOPHIL NFR BLD AUTO: 3.3 % — SIGNIFICANT CHANGE UP (ref 0–6)
GLUCOSE SERPL-MCNC: 90 MG/DL — SIGNIFICANT CHANGE UP (ref 70–99)
GLUCOSE UR QL: NEGATIVE — SIGNIFICANT CHANGE UP
HCT VFR BLD CALC: 42.7 % — SIGNIFICANT CHANGE UP (ref 39–50)
HGB BLD-MCNC: 13.3 G/DL — SIGNIFICANT CHANGE UP (ref 13–17)
IMM GRANULOCYTES NFR BLD AUTO: 0.5 % — SIGNIFICANT CHANGE UP (ref 0–1.5)
KETONES UR-MCNC: NEGATIVE — SIGNIFICANT CHANGE UP
LEUKOCYTE ESTERASE UR-ACNC: NEGATIVE — SIGNIFICANT CHANGE UP
LYMPHOCYTES # BLD AUTO: 2.67 K/UL — SIGNIFICANT CHANGE UP (ref 1–3.3)
LYMPHOCYTES # BLD AUTO: 40.1 % — SIGNIFICANT CHANGE UP (ref 13–44)
MCHC RBC-ENTMCNC: 23.9 PG — LOW (ref 27–34)
MCHC RBC-ENTMCNC: 31.1 GM/DL — LOW (ref 32–36)
MCV RBC AUTO: 76.8 FL — LOW (ref 80–100)
MONOCYTES # BLD AUTO: 0.55 K/UL — SIGNIFICANT CHANGE UP (ref 0–0.9)
MONOCYTES NFR BLD AUTO: 8.3 % — SIGNIFICANT CHANGE UP (ref 2–14)
NEUTROPHILS # BLD AUTO: 3.16 K/UL — SIGNIFICANT CHANGE UP (ref 1.8–7.4)
NEUTROPHILS NFR BLD AUTO: 47.3 % — SIGNIFICANT CHANGE UP (ref 43–77)
NITRITE UR-MCNC: NEGATIVE — SIGNIFICANT CHANGE UP
NRBC # BLD: 0 /100 WBCS — SIGNIFICANT CHANGE UP (ref 0–0)
PH UR: 7 — SIGNIFICANT CHANGE UP (ref 5–8)
PLATELET # BLD AUTO: 231 K/UL — SIGNIFICANT CHANGE UP (ref 150–400)
POTASSIUM SERPL-MCNC: 5.2 MMOL/L — SIGNIFICANT CHANGE UP (ref 3.5–5.3)
POTASSIUM SERPL-SCNC: 5.2 MMOL/L — SIGNIFICANT CHANGE UP (ref 3.5–5.3)
PROT UR-MCNC: NEGATIVE — SIGNIFICANT CHANGE UP
RBC # BLD: 5.56 M/UL — SIGNIFICANT CHANGE UP (ref 4.2–5.8)
RBC # FLD: 14.4 % — SIGNIFICANT CHANGE UP (ref 10.3–14.5)
SARS-COV-2 RNA SPEC QL NAA+PROBE: SIGNIFICANT CHANGE UP
SODIUM SERPL-SCNC: 141 MMOL/L — SIGNIFICANT CHANGE UP (ref 135–145)
SP GR SPEC: 1.01 — SIGNIFICANT CHANGE UP (ref 1.01–1.02)
UROBILINOGEN FLD QL: NEGATIVE — SIGNIFICANT CHANGE UP
WBC # BLD: 6.66 K/UL — SIGNIFICANT CHANGE UP (ref 3.8–10.5)
WBC # FLD AUTO: 6.66 K/UL — SIGNIFICANT CHANGE UP (ref 3.8–10.5)

## 2021-08-03 PROCEDURE — 99285 EMERGENCY DEPT VISIT HI MDM: CPT

## 2021-08-03 PROCEDURE — 90792 PSYCH DIAG EVAL W/MED SRVCS: CPT | Mod: 95

## 2021-08-03 RX ORDER — VALPROIC ACID (AS SODIUM SALT) 250 MG/5ML
250 SOLUTION, ORAL ORAL THREE TIMES A DAY
Refills: 0 | Status: DISCONTINUED | OUTPATIENT
Start: 2021-08-03 | End: 2021-08-03

## 2021-08-03 RX ORDER — ATORVASTATIN CALCIUM 80 MG/1
10 TABLET, FILM COATED ORAL AT BEDTIME
Refills: 0 | Status: DISCONTINUED | OUTPATIENT
Start: 2021-08-03 | End: 2021-08-04

## 2021-08-03 RX ORDER — PANTOPRAZOLE SODIUM 20 MG/1
40 TABLET, DELAYED RELEASE ORAL
Refills: 0 | Status: DISCONTINUED | OUTPATIENT
Start: 2021-08-03 | End: 2021-08-04

## 2021-08-03 RX ORDER — ONDANSETRON 8 MG/1
4 TABLET, FILM COATED ORAL EVERY 6 HOURS
Refills: 0 | Status: DISCONTINUED | OUTPATIENT
Start: 2021-08-03 | End: 2021-08-04

## 2021-08-03 RX ORDER — QUETIAPINE FUMARATE 200 MG/1
200 TABLET, FILM COATED ORAL DAILY
Refills: 0 | Status: DISCONTINUED | OUTPATIENT
Start: 2021-08-03 | End: 2021-08-04

## 2021-08-03 RX ORDER — ACETAMINOPHEN 500 MG
650 TABLET ORAL EVERY 6 HOURS
Refills: 0 | Status: DISCONTINUED | OUTPATIENT
Start: 2021-08-03 | End: 2021-08-04

## 2021-08-03 RX ORDER — PHENAZOPYRIDINE HCL 100 MG
200 TABLET ORAL EVERY 8 HOURS
Refills: 0 | Status: DISCONTINUED | OUTPATIENT
Start: 2021-08-03 | End: 2021-08-03

## 2021-08-03 RX ORDER — DEXTROSE MONOHYDRATE, SODIUM CHLORIDE, AND POTASSIUM CHLORIDE 50; .745; 4.5 G/1000ML; G/1000ML; G/1000ML
1000 INJECTION, SOLUTION INTRAVENOUS
Refills: 0 | Status: DISCONTINUED | OUTPATIENT
Start: 2021-08-03 | End: 2021-08-04

## 2021-08-03 RX ORDER — FINASTERIDE 5 MG/1
5 TABLET, FILM COATED ORAL DAILY
Refills: 0 | Status: DISCONTINUED | OUTPATIENT
Start: 2021-08-03 | End: 2021-08-04

## 2021-08-03 RX ORDER — SENNA PLUS 8.6 MG/1
2 TABLET ORAL AT BEDTIME
Refills: 0 | Status: DISCONTINUED | OUTPATIENT
Start: 2021-08-03 | End: 2021-08-04

## 2021-08-03 RX ORDER — HEPARIN SODIUM 5000 [USP'U]/ML
5000 INJECTION INTRAVENOUS; SUBCUTANEOUS EVERY 8 HOURS
Refills: 0 | Status: DISCONTINUED | OUTPATIENT
Start: 2021-08-03 | End: 2021-08-04

## 2021-08-03 RX ORDER — TAMSULOSIN HYDROCHLORIDE 0.4 MG/1
0.4 CAPSULE ORAL AT BEDTIME
Refills: 0 | Status: DISCONTINUED | OUTPATIENT
Start: 2021-08-03 | End: 2021-08-04

## 2021-08-03 RX ORDER — PHENAZOPYRIDINE HCL 100 MG
200 TABLET ORAL EVERY 8 HOURS
Refills: 0 | Status: DISCONTINUED | OUTPATIENT
Start: 2021-08-03 | End: 2021-08-04

## 2021-08-03 RX ORDER — LEVOTHYROXINE SODIUM 125 MCG
50 TABLET ORAL DAILY
Refills: 0 | Status: DISCONTINUED | OUTPATIENT
Start: 2021-08-03 | End: 2021-08-04

## 2021-08-03 RX ORDER — SODIUM CHLORIDE 9 MG/ML
3 INJECTION INTRAMUSCULAR; INTRAVENOUS; SUBCUTANEOUS EVERY 8 HOURS
Refills: 0 | Status: DISCONTINUED | OUTPATIENT
Start: 2021-08-03 | End: 2021-08-04

## 2021-08-03 RX ORDER — VALPROIC ACID (AS SODIUM SALT) 250 MG/5ML
1000 SOLUTION, ORAL ORAL
Refills: 0 | Status: DISCONTINUED | OUTPATIENT
Start: 2021-08-03 | End: 2021-08-03

## 2021-08-03 RX ORDER — HALOPERIDOL DECANOATE 100 MG/ML
5 INJECTION INTRAMUSCULAR EVERY 6 HOURS
Refills: 0 | Status: DISCONTINUED | OUTPATIENT
Start: 2021-08-03 | End: 2021-08-04

## 2021-08-03 RX ADMIN — QUETIAPINE FUMARATE 200 MILLIGRAM(S): 200 TABLET, FILM COATED ORAL at 21:47

## 2021-08-03 RX ADMIN — Medication 650 MILLIGRAM(S): at 14:30

## 2021-08-03 RX ADMIN — SODIUM CHLORIDE 3 MILLILITER(S): 9 INJECTION INTRAMUSCULAR; INTRAVENOUS; SUBCUTANEOUS at 13:45

## 2021-08-03 RX ADMIN — Medication 650 MILLIGRAM(S): at 13:44

## 2021-08-03 RX ADMIN — Medication 0.2 MILLIGRAM(S): at 21:47

## 2021-08-03 RX ADMIN — ATORVASTATIN CALCIUM 10 MILLIGRAM(S): 80 TABLET, FILM COATED ORAL at 21:47

## 2021-08-03 RX ADMIN — Medication 200 MILLIGRAM(S): at 21:47

## 2021-08-03 RX ADMIN — HEPARIN SODIUM 5000 UNIT(S): 5000 INJECTION INTRAVENOUS; SUBCUTANEOUS at 21:47

## 2021-08-03 RX ADMIN — SODIUM CHLORIDE 3 MILLILITER(S): 9 INJECTION INTRAMUSCULAR; INTRAVENOUS; SUBCUTANEOUS at 21:46

## 2021-08-03 RX ADMIN — Medication 2 MILLIGRAM(S): at 23:21

## 2021-08-03 RX ADMIN — TAMSULOSIN HYDROCHLORIDE 0.4 MILLIGRAM(S): 0.4 CAPSULE ORAL at 21:47

## 2021-08-03 RX ADMIN — Medication 200 MILLIGRAM(S): at 13:44

## 2021-08-03 RX ADMIN — HALOPERIDOL DECANOATE 5 MILLIGRAM(S): 100 INJECTION INTRAMUSCULAR at 23:21

## 2021-08-03 RX ADMIN — FINASTERIDE 5 MILLIGRAM(S): 5 TABLET, FILM COATED ORAL at 13:44

## 2021-08-03 RX ADMIN — SENNA PLUS 2 TABLET(S): 8.6 TABLET ORAL at 21:47

## 2021-08-03 NOTE — BH CONSULTATION LIAISON ASSESSMENT NOTE - NSBHCHARTREVIEWVS_PSY_A_CORE FT
Vital Signs Last 24 Hrs  T(C): 36.7 (03 Aug 2021 12:32), Max: 36.7 (03 Aug 2021 03:29)  T(F): 98.1 (03 Aug 2021 12:32), Max: 98.1 (03 Aug 2021 12:32)  HR: 69 (03 Aug 2021 12:32) (67 - 76)  BP: 127/80 (03 Aug 2021 12:32) (95/59 - 156/69)  BP(mean): --  RR: 17 (03 Aug 2021 12:32) (17 - 18)  SpO2: 98% (03 Aug 2021 12:32) (95% - 99%)

## 2021-08-03 NOTE — BH CONSULTATION LIAISON ASSESSMENT NOTE - CURRENT MEDICATION
MEDICATIONS  (STANDING):  atorvastatin 10 milliGRAM(s) Oral at bedtime  cloNIDine 0.2 milliGRAM(s) Oral at bedtime  finasteride 5 milliGRAM(s) Oral daily  heparin   Injectable 5000 Unit(s) SubCutaneous every 8 hours  levothyroxine 50 MICROGram(s) Oral daily  pantoprazole    Tablet 40 milliGRAM(s) Oral before breakfast  phenazopyridine 200 milliGRAM(s) Oral every 8 hours  senna 2 Tablet(s) Oral at bedtime  sodium chloride 0.45% with potassium chloride 20 mEq/L 1000 milliLiter(s) (125 mL/Hr) IV Continuous <Continuous>  sodium chloride 0.9% lock flush 3 milliLiter(s) IV Push every 8 hours  tamsulosin 0.4 milliGRAM(s) Oral at bedtime  valproic  acid Syrup 1000 milliGRAM(s) Oral two times a day    MEDICATIONS  (PRN):  acetaminophen   Tablet .. 650 milliGRAM(s) Oral every 6 hours PRN Temp greater or equal to 38C (100.4F), Mild Pain (1 - 3)  ondansetron Injectable 4 milliGRAM(s) IV Push every 6 hours PRN Nausea

## 2021-08-03 NOTE — BH CONSULTATION LIAISON ASSESSMENT NOTE - SUMMARY
32yo M, domiciled at [facility], PPH of intellectual disability, mood disorder, factitious disorder, past psych admissions, no known SA, hx of self injury (nonsuicidal), hx of aggression, no legal issues, BIBEMS for urinary retention and admitted to urology service. Psych consulted given history of self injurious behavior. On exam he presents as linear, euthymic, without acute SI/HI. Says that he last self harmed 2 years ago in setting of breakup. There is no indication of recent or current psychiatric deterioration - patient presents at his known baseline. He is future oriented, wishes to return home as soon as possible. There is no indication for higher level of psychiatric care and he is cleared for discharge.

## 2021-08-03 NOTE — ED PROVIDER NOTE - OBJECTIVE STATEMENT
Pt with urinary retention since last night. Bladder scan demonstrates 710cc of retained urine.  Mckeon cath placed with drainage.  Pt states has hx of ADD and MR and unable to manage indwelling catheter. Pt also states he resides in group home and staff not equipped to manage mckeon.  Case was d/w urologist Dr. Lutz, will admit pt to his service.

## 2021-08-03 NOTE — H&P ADULT - HISTORY OF PRESENT ILLNESS
31 year old Male, with PMH of Mental restriction with developmental delay, ADHD, asthma, impulse disorder and mood disorder sent in from the Group Home to the ED with complaint of urinary retention.  Pt seen and examined at bedside, overall poor historian, admits to inability to urinate since yesterday, c/o lower abd discomfort. States he has had urinary retention in the past, seen multiple times at the hospital, no urological intervention done. States prior to yesterday has been having dysuria or hematuria. Admits to constipation. Denies fever, chills, SOB or CP. Offers no other complaints.   Sarabia catheter placed in ED.

## 2021-08-03 NOTE — ED ADULT NURSE REASSESSMENT NOTE - NS ED NURSE REASSESS COMMENT FT1
Pt. is stable at this time. Pt. received food to eat. mckeon is secure and draining. No complaints voiced. Pt. is stable to go to [address] [place] [address] for continued care.

## 2021-08-03 NOTE — ED ADULT NURSE NOTE - CHIEF COMPLAINT QUOTE
BIBEMS for urinary retention.  He states that the last time he urinated was yesterday.  He has suprapubic pain and tenderness.

## 2021-08-03 NOTE — CHART NOTE - NSCHARTNOTEFT_GEN_A_CORE
Patient seen after called by nurse. States he is adamant about removing catheter, threatens to pull it out. After discussion with pt, agrees to TOV and observation. If continues to void, agree that he can likely dc tomorrow. If not, will consider replacement of catheter. Pt also expresses desire to have one-to-one as he is "sad about recent breakup" and recounts an episode of hurting himself last week because of it. Does not express active SI/HI.     --TOV. AM labs, RBUS  --1 to 1 given hx of self harm. Safe tray to avoid sharp objects on meal tray  --Psych c/s given hx and patient vocalization of depressed mood. Psych team aware and will see pt

## 2021-08-03 NOTE — BH CONSULTATION LIAISON ASSESSMENT NOTE - RISK ASSESSMENT
Pt is at chronically elevated risk of harm to self/others given h/o self-harm, h/o aggression, poor impulse control and poor frustration tolerance due to intellectual disability,   protective factors include being domiciled, no hx SA, on 1:1 supervision at group home, no access to guns/weapons, denies insomnia, denies SI/HI, future-oriented, help-seeking.   Pt is not at acutely elevated risk of harm to self or others.

## 2021-08-03 NOTE — PROVIDER CONTACT NOTE (OTHER) - ASSESSMENT
pt alert and oriented, very hyper and insisiting on removing the mckeon catheter, pt also wants to leave the hospital

## 2021-08-03 NOTE — H&P ADULT - ASSESSMENT
32 y/o Male w/ Urinary retention, PMH of Mental restriction with developmental delay, ADHD, asthma, impulse disorder and mood disorder    -Admit pt to Urology services Dr Lutz   -C/w Sarabia Catheter   -Flomax   -Pyridium   -C/w Home Medication   -Reg diet   -D/w Dr Lutz and agrees

## 2021-08-03 NOTE — BH CONSULTATION LIAISON ASSESSMENT NOTE - NSBHMSEINTELL_PSY_A_CORE
F/W to rec;  Pt is overdue for follow up with device clinic as well as with Dr. Garcia.  Ok to schedule both on the same day.      Device:  ABT/CRT-D   Average

## 2021-08-03 NOTE — BH CONSULTATION LIAISON ASSESSMENT NOTE - NSBHCONSULTRECOMMENDOTHER_PSY_A_CORE FT
Continue current outpatient medications  Patient does not need 1:1 observation  Patient is psychiatrically cleared for discharge.

## 2021-08-03 NOTE — ED ADULT TRIAGE NOTE - CHIEF COMPLAINT QUOTE
BIBEMS for urinary retention.  He states that the last time he urinated was yesterday.  He has suprapubic pain and tenderness. PERRL/EOMI/conjunctiva clear detailed exam

## 2021-08-03 NOTE — ED PROVIDER NOTE - CLINICAL SUMMARY MEDICAL DECISION MAKING FREE TEXT BOX
Case was d/w urologist Dr. Lutz, will admit pt to his service.  I had a detailed discussion with the patient and/or guardian regarding the historical points, exam findings, and any diagnostic results supporting the admit diagnosis.

## 2021-08-03 NOTE — BH CONSULTATION LIAISON ASSESSMENT NOTE - HPI (INCLUDE ILLNESS QUALITY, SEVERITY, DURATION, TIMING, CONTEXT, MODIFYING FACTORS, ASSOCIATED SIGNS AND SYMPTOMS)
30yo M, domiciled at [facility], PPH of intellectual disability, mood disorder, factitious disorder, past psych admissions, no known SA, hx of self injury (nonsuicidal), hx of aggression, no legal issues, BIBEMS for urinary retention and admitted to urology service, psych consulted as patient has a history of self harm.     On my evaluation, pt sitting up in bed, calm, alert, oriented, Denies any recent changes in his mood. Denies acute SI or urges to self harm. Denies depression. Denies AVH. He was future oriented, looking forward to going back to the [facility] because his check was coming soon.

## 2021-08-03 NOTE — ED ADULT NURSE REASSESSMENT NOTE - NS ED NURSE REASSESS COMMENT FT1
Patient presented with c/o difficulty urinating, c/o pain from pelvic area upon palpation. Bladder scan showed 709ml of urine. MD. Shields notified. Sarabia cath inserted, and draining clear, yellow urine. Approximately 700ml of urine noted in drainage bag. Patient verbalized relief from pelvic area.

## 2021-08-04 ENCOUNTER — TRANSCRIPTION ENCOUNTER (OUTPATIENT)
Age: 31
End: 2021-08-04

## 2021-08-04 VITALS
SYSTOLIC BLOOD PRESSURE: 120 MMHG | OXYGEN SATURATION: 99 % | HEART RATE: 75 BPM | TEMPERATURE: 98 F | DIASTOLIC BLOOD PRESSURE: 75 MMHG | RESPIRATION RATE: 18 BRPM

## 2021-08-04 LAB
ANION GAP SERPL CALC-SCNC: 7 MMOL/L — SIGNIFICANT CHANGE UP (ref 5–17)
BUN SERPL-MCNC: 11 MG/DL — SIGNIFICANT CHANGE UP (ref 7–18)
CALCIUM SERPL-MCNC: 9 MG/DL — SIGNIFICANT CHANGE UP (ref 8.4–10.5)
CHLORIDE SERPL-SCNC: 105 MMOL/L — SIGNIFICANT CHANGE UP (ref 96–108)
CO2 SERPL-SCNC: 28 MMOL/L — SIGNIFICANT CHANGE UP (ref 22–31)
CREAT SERPL-MCNC: 0.82 MG/DL — SIGNIFICANT CHANGE UP (ref 0.5–1.3)
CULTURE RESULTS: NO GROWTH — SIGNIFICANT CHANGE UP
GLUCOSE SERPL-MCNC: 92 MG/DL — SIGNIFICANT CHANGE UP (ref 70–99)
HCT VFR BLD CALC: 42.7 % — SIGNIFICANT CHANGE UP (ref 39–50)
HGB BLD-MCNC: 13.2 G/DL — SIGNIFICANT CHANGE UP (ref 13–17)
MCHC RBC-ENTMCNC: 23.8 PG — LOW (ref 27–34)
MCHC RBC-ENTMCNC: 30.9 GM/DL — LOW (ref 32–36)
MCV RBC AUTO: 76.9 FL — LOW (ref 80–100)
NRBC # BLD: 0 /100 WBCS — SIGNIFICANT CHANGE UP (ref 0–0)
PLATELET # BLD AUTO: 248 K/UL — SIGNIFICANT CHANGE UP (ref 150–400)
POTASSIUM SERPL-MCNC: 4.2 MMOL/L — SIGNIFICANT CHANGE UP (ref 3.5–5.3)
POTASSIUM SERPL-SCNC: 4.2 MMOL/L — SIGNIFICANT CHANGE UP (ref 3.5–5.3)
RBC # BLD: 5.55 M/UL — SIGNIFICANT CHANGE UP (ref 4.2–5.8)
RBC # FLD: 14.2 % — SIGNIFICANT CHANGE UP (ref 10.3–14.5)
SODIUM SERPL-SCNC: 140 MMOL/L — SIGNIFICANT CHANGE UP (ref 135–145)
SPECIMEN SOURCE: SIGNIFICANT CHANGE UP
WBC # BLD: 6.46 K/UL — SIGNIFICANT CHANGE UP (ref 3.8–10.5)
WBC # FLD AUTO: 6.46 K/UL — SIGNIFICANT CHANGE UP (ref 3.8–10.5)

## 2021-08-04 PROCEDURE — 87086 URINE CULTURE/COLONY COUNT: CPT

## 2021-08-04 PROCEDURE — 85025 COMPLETE CBC W/AUTO DIFF WBC: CPT

## 2021-08-04 PROCEDURE — 81003 URINALYSIS AUTO W/O SCOPE: CPT

## 2021-08-04 PROCEDURE — 85027 COMPLETE CBC AUTOMATED: CPT

## 2021-08-04 PROCEDURE — 36415 COLL VENOUS BLD VENIPUNCTURE: CPT

## 2021-08-04 PROCEDURE — 87635 SARS-COV-2 COVID-19 AMP PRB: CPT

## 2021-08-04 PROCEDURE — 99285 EMERGENCY DEPT VISIT HI MDM: CPT

## 2021-08-04 PROCEDURE — 80048 BASIC METABOLIC PNL TOTAL CA: CPT

## 2021-08-04 RX ADMIN — SODIUM CHLORIDE 3 MILLILITER(S): 9 INJECTION INTRAMUSCULAR; INTRAVENOUS; SUBCUTANEOUS at 05:08

## 2021-08-04 RX ADMIN — Medication 200 MILLIGRAM(S): at 05:14

## 2021-08-04 RX ADMIN — HEPARIN SODIUM 5000 UNIT(S): 5000 INJECTION INTRAVENOUS; SUBCUTANEOUS at 05:13

## 2021-08-04 RX ADMIN — PANTOPRAZOLE SODIUM 40 MILLIGRAM(S): 20 TABLET, DELAYED RELEASE ORAL at 06:14

## 2021-08-04 RX ADMIN — Medication 50 MICROGRAM(S): at 05:14

## 2021-08-04 RX ADMIN — HALOPERIDOL DECANOATE 5 MILLIGRAM(S): 100 INJECTION INTRAMUSCULAR at 09:13

## 2021-08-04 NOTE — BH CONSULTATION LIAISON PROGRESS NOTE - NSBHCHARTREVIEWVS_PSY_A_CORE FT
Vital Signs Last 24 Hrs  T(C): 36.3 (04 Aug 2021 05:56), Max: 36.9 (03 Aug 2021 22:10)  T(F): 97.3 (04 Aug 2021 05:56), Max: 98.5 (03 Aug 2021 22:10)  HR: 84 (04 Aug 2021 05:56) (68 - 84)  BP: 102/60 (04 Aug 2021 05:56) (95/59 - 133/80)  BP(mean): --  RR: 18 (04 Aug 2021 05:56) (17 - 18)  SpO2: 99% (04 Aug 2021 05:56) (98% - 99%)

## 2021-08-04 NOTE — BH CONSULTATION LIAISON PROGRESS NOTE - NSBHFUPINTERVALHXFT_PSY_A_CORE
Chart reviewed, patient seen and evaluated. Upon initiation of encounter patient is seen rocking in his chair. Patient reports that he is upset because he wants his things and wants to go home. Patient reports that he was told that he would be able to go back to [facility] (home) today. He states that he did call the  last night because he couldn't find his things and the staff wouldn't give them to him. He reports he was worried that they would lose his ID. Patient denies calling because he felt unsafe or had SI/HI. Patient denies any passive or suicidal thoughts at this time. Patient denies any AVH. Patient reports that otherwise he slept well last night and had breakfast this morning. He is planning to get his belongings and return to his home where he lives with a  roommate.

## 2021-08-04 NOTE — BH CONSULTATION LIAISON PROGRESS NOTE - CURRENT MEDICATION
MEDICATIONS  (STANDING):  atorvastatin 10 milliGRAM(s) Oral at bedtime  cloNIDine 0.2 milliGRAM(s) Oral at bedtime  finasteride 5 milliGRAM(s) Oral daily  heparin   Injectable 5000 Unit(s) SubCutaneous every 8 hours  levothyroxine 50 MICROGram(s) Oral daily  pantoprazole    Tablet 40 milliGRAM(s) Oral before breakfast  phenazopyridine 200 milliGRAM(s) Oral every 8 hours  QUEtiapine 200 milliGRAM(s) Oral daily  senna 2 Tablet(s) Oral at bedtime  sodium chloride 0.45% with potassium chloride 20 mEq/L 1000 milliLiter(s) (125 mL/Hr) IV Continuous <Continuous>  sodium chloride 0.9% lock flush 3 milliLiter(s) IV Push every 8 hours  tamsulosin 0.4 milliGRAM(s) Oral at bedtime    MEDICATIONS  (PRN):  acetaminophen   Tablet .. 650 milliGRAM(s) Oral every 6 hours PRN Temp greater or equal to 38C (100.4F), Mild Pain (1 - 3)  haloperidol    Injectable 5 milliGRAM(s) IntraMuscular every 6 hours PRN Agitation  LORazepam   Injectable 2 milliGRAM(s) IV Push every 6 hours PRN Agitation  ondansetron Injectable 4 milliGRAM(s) IV Push every 6 hours PRN Nausea

## 2021-08-04 NOTE — BH CONSULTATION LIAISON PROGRESS NOTE - NSBHCHARTREVIEWLAB_PSY_A_CORE FT
13.2   6.46  )-----------( 248      ( 04 Aug 2021 06:08 )             42.7   08-04    140  |  105  |  11  ----------------------------<  92  4.2   |  28  |  0.82    Ca    9.0      04 Aug 2021 06:08

## 2021-08-04 NOTE — DISCHARGE NOTE PROVIDER - NSDCFUADDINST_GEN_ALL_CORE_FT
Follow up: Please plan to see Dr. Lutz within 1 week of discharge to discuss further evaluation of your retention.  Follow up: Please plan to see Dr. Lutz within 1 week of discharge to discuss further evaluation of your retention.   NOTIFY YOUR SURGEON IF: You have any bleeding that does not stop, any pus draining from your wound, any fever (over 100.4 F) or chills, persistent nausea/vomiting, persistent diarrhea, or if your pain is not controlled on your discharge pain medications.

## 2021-08-04 NOTE — DISCHARGE NOTE NURSING/CASE MANAGEMENT/SOCIAL WORK - PATIENT PORTAL LINK FT
You can access the FollowMyHealth Patient Portal offered by [facility] by registering at the following website: [email or web address]. By joining [facility]’s FollowMyHealth portal, you will also be able to view your health information using other applications (apps) compatible with our system.

## 2021-08-04 NOTE — DISCHARGE NOTE PROVIDER - NSDCCPCAREPLAN_GEN_ALL_CORE_FT
PRINCIPAL DISCHARGE DIAGNOSIS  Diagnosis: Urinary retention  Assessment and Plan of Treatment:

## 2021-08-04 NOTE — DISCHARGE NOTE PROVIDER - NSDCMRMEDTOKEN_GEN_ALL_CORE_FT
cloNIDine 0.1 mg oral tablet: 2 tab(s) orally once a day (at bedtime)  Depakene 250 mg/5 mL oral liquid: 20 milliliter(s) orally 2 times a day  finasteride 5 mg oral tablet: 1 tab(s) orally once a day  levothyroxine 50 mcg (0.05 mg) oral tablet: 1 tab(s) orally once a day  pantoprazole 20 mg oral delayed release tablet: 1 tab(s) orally once a day  phenazopyridine 200 mg oral tablet: 1 tab(s) orally every 8 hours  pravastatin 20 mg oral tablet: 1 tab(s) orally once a day  SEROquel 200 mg oral tablet: 1 tab(s) orally once a day  tamsulosin 0.4 mg oral capsule: 1 cap(s) orally once a day (at bedtime)  Vitamin D2 50,000 intl units (1.25 mg) oral capsule: 1 cap(s) orally once a week

## 2021-08-04 NOTE — DISCHARGE NOTE NURSING/CASE MANAGEMENT/SOCIAL WORK - NSDCVIVACCINE_GEN_ALL_CORE_FT
Tdap; 20-Dec-2018 12:21; Lo Anaya (RN); [employer]; [id number] (Exp. Date: 02-Nov-2020); IntraMuscular; Deltoid Left.; 0.5 milliLiter(s); VIS (VIS Published: 09-May-2013, VIS Presented: 20-Dec-2018);   Tdap; 26-Mar-2019 18:59; Shavon Barrios (ANALI); [employer]; [id number] (Exp. Date: 26-Feb-2021); IntraMuscular; Deltoid Left.; 0.5 milliLiter(s); VIS (VIS Published: 09-May-2013, VIS Presented: 26-Mar-2019);

## 2021-08-04 NOTE — BH CONSULTATION LIAISON PROGRESS NOTE - NSBHASSESSMENTFT_PSY_ALL_CORE
Patient is a 31 year old man with ID, living at [facility] in [facility], recurrent presentations for urinary retention, has had psych evals for thoughts of self harm, admitted now for urinary retention. Psychiatry called for safety assessment initially, was psychiatrically cleared yesterday but reported to have had behavioral issues and called 911 last night. Upon assessment today patient remains at his baseline, euthymic, linear, no SI/HI/AVH, no thoughts of self harm. Patient's behavioral issues along with his comments of self harm are likely in line with attention seeking behavior rather than true primary psychiatry illness. Psychiatry cleared for discharge. No 1:1 needed.

## 2021-08-04 NOTE — BH CONSULTATION LIAISON PROGRESS NOTE - NSBHCONSULTRECOMMENDOTHER_PSY_A_CORE FT
Continue current outpatient medications  Patient does not need 1:1 observation  Patient is psychiatrically cleared for discharge

## 2021-08-04 NOTE — DISCHARGE NOTE PROVIDER - HOSPITAL COURSE
31 year old Male, with PMH of Mental restriction with developmental delay, ADHD, hypothyroidism, asthma, impulse disorder and mood disorder sent in from the Group Home to the ED with complaint of urinary retention. He had multiple episodes of retention requiring admission and most recently had 700cc in his bladder after cathter placement. By the evening of hospital day 1, the patient was adamant that he could void on his own and wanted the catheter out, threatening to remove it himself. Catheter was removed and patient passed his trial of void successfully. Given that he has had a past hx of self harm and patient expressed self harm ideations during this hospital stay, psychiatry was consulted. They cleared patient for discharge. He will discharge back to his group home and can follow up with Dr Lutz as an outpatient. 31 year old Male, with PMH of Mental restriction with developmental delay, ADHD, hypothyroidism, asthma, impulse disorder and mood disorder sent in from the Group Home to the ED with complaint of urinary retention. He had multiple episodes of retention requiring admission and most recently had 700cc in his bladder after cathter placement. By the evening of hospital day 1, the patient was adamant that he could void on his own and wanted the catheter out, threatening to remove it himself. Catheter was removed and patient passed his trial of void successfully. Given that he has had a past hx of self harm and patient expressed self harm ideations during this hospital stay, psychiatry was consulted. They cleared patient for discharge. Patient expresses he no longer wishes to stay in hospital and wants to return to his group home. He will discharge back to his group home and can follow up with Dr Lutz as an outpatient. 31 year old Male, with PMH of Mental restriction with developmental delay, ADHD, hypothyroidism, asthma, impulse disorder and mood disorder sent in from the Group Home to the ED with complaint of urinary retention. He had multiple episodes of retention requiring admission and most recently had 700cc in his bladder after cathter placement. By the evening of hospital day 1, the patient was adamant that he could void on his own and wanted the catheter out, threatening to remove it himself. Catheter was removed and patient passed his trial of void successfully. Given that he has had a past hx of self harm and patient expressed self harm ideations during this hospital stay, psychiatry was consulted. They cleared patient for discharge. Patient expresses he no longer wishes to stay in hospital and wants to return to his group home. He will discharge back to his group home and can follow up with Dr Lutz as an outpatient. Prior to dc, he was asked to void and his bladder scan was checked and found to be empty.

## 2021-08-04 NOTE — PROGRESS NOTE ADULT - ASSESSMENT
32 y/o Male w/ Urinary retention, PMH of Mental restriction with developmental delay, ADHD, asthma, impulse disorder and mood disorder    - passed tov  - rpt psych eval today given overnight events  - am labs reviewed, no abnormalities  - if cleared by psych, pt can dc back to [facility]

## 2021-08-04 NOTE — DISCHARGE NOTE PROVIDER - CARE PROVIDER_API CALL
Len Lutz)  Urology  [address] [place] [address]  Phone: [phone]  Fax: [phone]  Follow Up Time: 2 weeks

## 2021-08-04 NOTE — PROGRESS NOTE ADULT - SUBJECTIVE AND OBJECTIVE BOX
HPI:  31 year old Male, with PMH of Mental restriction with developmental delay, ADHD, hypothyroidism, asthma, impulse disorder and mood disorder sent in from the Group Home to the ED with complaint of urinary retention.  Pt seen and examined at bedside, overall poor historian, admits to inability to urinate since yesterday, c/o lower abd discomfort. States he has had urinary retention in the past, seen multiple times at the hospital, no urological intervention done. States prior to yesterday has been having dysuria or hematuria. Admits to constipation. Denies fever, chills, SOB or CP. Offers no other complaints.   Sarabia catheter placed in ED.        (03 Aug 2021 09:09)      PAST MEDICAL & SURGICAL HISTORY:  Mood disorder    Impulse control disorder    Intellectual disability    Asthma    GERD (gastroesophageal reflux disease)    Factitious disorder    Mitral valve disease    Urinary retention    Hypothyroidism    Enuresis    Myopia of both eyes    Autism /ASPERGER SYNDROME    BPH            Bee stings (Unknown)  Haldol (Other)  Zyprexa (Dystonic RXN)      Social Hx: PATIENT STARTED VAPING IN THE PAST FEW MONTHS [TOBACCO], DRINKS INTERMITTENTLY [OCCASIONALLY 1 BOTTLE OF WINE]    FAMILY HISTORY:      acetaminophen   Tablet .. 650 milliGRAM(s) Oral every 6 hours PRN  atorvastatin 10 milliGRAM(s) Oral at bedtime  cloNIDine 0.2 milliGRAM(s) Oral at bedtime  finasteride 5 milliGRAM(s) Oral daily  heparin   Injectable 5000 Unit(s) SubCutaneous every 8 hours  levothyroxine 50 MICROGram(s) Oral daily  ondansetron Injectable 4 milliGRAM(s) IV Push every 6 hours PRN  pantoprazole    Tablet 40 milliGRAM(s) Oral before breakfast  phenazopyridine 200 milliGRAM(s) Oral every 8 hours  senna 2 Tablet(s) Oral at bedtime  sodium chloride 0.45% with potassium chloride 20 mEq/L 1000 milliLiter(s) IV Continuous <Continuous>  sodium chloride 0.9% lock flush 3 milliLiter(s) IV Push every 8 hours  tamsulosin 0.4 milliGRAM(s) Oral at bedtime  valproic  acid Syrup 1000 milliGRAM(s) Oral two times a day      MEDICATIONS  (PRN):  acetaminophen   Tablet .. 650 milliGRAM(s) Oral every 6 hours PRN Temp greater or equal to 38C (100.4F), Mild Pain (1 - 3)  ondansetron Injectable 4 milliGRAM(s) IV Push every 6 hours PRN Nausea      T(C): 36.7 (08-03-21 @ 12:32), Max: 36.7 (08-03-21 @ 03:29)  HR: 69 (08-03-21 @ 12:32) (67 - 76)  BP: 127/80 (08-03-21 @ 12:32) (95/59 - 156/69)  RR: 17 (08-03-21 @ 12:32) (17 - 18)  SpO2: 98% (08-03-21 @ 12:32) (95% - 99%)        REVIEW OF SYSTEMS:                           ALL ROS DONE [ X   ]    CONSTITUTIONAL:  LETHARGIC [   ], FEVER [   ], UNRESPONSIVE [   ]  CVS:  CP  [   ], SOB, [   ], PALPITATIONS [   ], DIZZYNESS [   ]  RS: COUGH [   ], SPUTUM [   ]  GI: ABDOMINAL PAIN [   ], NAUSEA [   ], VOMITINGS [   ], DIARRHEA [   ], CONSTIPATION [   ]  :  DYSURIA [   ], NOCTURIA [   ], INCREASED FREQUENCY [   ], DRIBLING [   ],  SKELETAL: PAINFUL JOINTS [   ], SWOLLEN JOINTS [   ], NECK ACHE [   ], LOW BACK ACHE [   ],  SKIN : ULCERS [   ], RASH [   ], ITCHING [   ]  CNS: HEAD ACHE [   ], DOUBLE VISION [   ], BLURRED VISION [   ], AMS / CONFUSION [   ], SEIZURES [   ], WEAKNESS [   ],TINGLING / NUMBNESS [   ]    PHYSICAL EXAMINATION:  GENERAL APPEARANCE: NO DISTRESS  HEENT:  NO PALLOR, NO  JVD,  NO   NODES, NECK SUPPLE  CVS: S1 +, S2 +,   RS: AEEB,  OCCASIONAL  RALES +,   NO RONCHI  ABD: SOFT, NT, NO, BS +  EXT: NO PE  SKIN: WARM,  MULTIPLE TATTOOS  SKELETAL:  ROM ACCEPTABLE  CNS:  AAO X 3, NO DEFICITS    RADIOLOGY :    NONE      ASSESSMENT :       PLAN:  HPI:  31 year old Male, with PMH of Mental restriction with developmental delay, ADHD, asthma, impulse disorder and mood disorder sent in from the Group Home to the ED with complaint of urinary retention.  Pt seen and examined at bedside, overall poor historian, admits to inability to urinate since yesterday, c/o lower abd discomfort. States he has had urinary retention in the past, seen multiple times at the hospital, no urological intervention done. States prior to yesterday has been having dysuria or hematuria. Admits to constipation. Denies fever, chills, SOB or CP. Offers no other complaints.   Sarabia catheter placed in ED.        (03 Aug 2021 09:09)    # URINARY RETENTION - ? UNDERLYING BPH [PATIENT IS ON FLOMAX AND FINASTERIDE AT GROUP HOME ] - PASSED TOV, F/U BLADDER SCAN, RESUME FLOMAX AND FINASTERIDE, UA WNL  # CONSTIPATION - RESOLVED PATIENT REPORTS B, CONTINUE MIRALAX AND SENNA  # DEVELOPMENTAL DELAY, ADHD, IMPULSE CONTROL DISORDER, MOOD DISORDER - EVALUATED BY PSYCHIATRY, NO SI/HI, CLONAZEPAM, ? UNCLEAR IS PATIENT ON SEROQUEL VS. DEPAKENE - F/U PSYCHIATRY  # HLD - CONTINUE STATIN  # HYPOTHYROIDISM - CONTINUE SYNTHROID  # ASTHMA - CONTINUE SYMBICORT  # GI AND DVT PPX      
DAILY PROGRESS NOTE:         24 hr events:  agitated overnight, pt called police vocalizing desire to cause self harm  this am appears calm and peaceful.   mckeon removed last night, passed tov     Objective:    Vital Signs Last 24 Hrs  T(C): 36.3 (04 Aug 2021 05:56), Max: 36.9 (03 Aug 2021 22:10)  T(F): 97.3 (04 Aug 2021 05:56), Max: 98.5 (03 Aug 2021 22:10)  HR: 84 (04 Aug 2021 05:56) (68 - 84)  BP: 102/60 (04 Aug 2021 05:56) (95/59 - 133/80)  BP(mean): --  RR: 18 (04 Aug 2021 05:56) (17 - 18)  SpO2: 99% (04 Aug 2021 05:56) (98% - 99%)    I&O's Detail    03 Aug 2021 07:01  -  04 Aug 2021 07:00  --------------------------------------------------------  IN:  Total IN: 0 mL    OUT:    Voided (mL): 2550 mL  Total OUT: 2550 mL    Total NET: -2550 mL          Physical Exam:    General: NAD, well-nourished  HEENT: Atraumatic, EOMI  Resp: Breathing comfortably on RA  : voiding       Laboratory Results:                          13.2   6.46  )-----------( 248      ( 04 Aug 2021 06:08 )             42.7     08-04    140  |  105  |  11  ----------------------------<  92  4.2   |  28  |  0.82    Ca    9.0      04 Aug 2021 06:08        Urinalysis Basic - ( 03 Aug 2021 04:53 )    Color: Yellow / Appearance: Clear / S.010 / pH: x  Gluc: x / Ketone: Negative  / Bili: Negative / Urobili: Negative   Blood: x / Protein: Negative / Nitrite: Negative   Leuk Esterase: Negative / RBC: x / WBC x   Sq Epi: x / Non Sq Epi: x / Bacteria: x

## 2021-08-06 ENCOUNTER — INPATIENT (INPATIENT)
Facility: HOSPITAL | Age: 31
LOS: 3 days | Discharge: NURSING, REHAB OR LONG-TERM CARE | DRG: 696 | End: 2021-08-10
Attending: HOSPITALIST | Admitting: HOSPITALIST
Payer: MEDICAID

## 2021-08-06 VITALS
RESPIRATION RATE: 16 BRPM | HEART RATE: 72 BPM | DIASTOLIC BLOOD PRESSURE: 85 MMHG | WEIGHT: 251.33 LBS | OXYGEN SATURATION: 98 % | TEMPERATURE: 97 F | SYSTOLIC BLOOD PRESSURE: 121 MMHG | HEIGHT: 71 IN

## 2021-08-06 NOTE — ED ADULT TRIAGE NOTE - CHIEF COMPLAINT QUOTE
" I can't go since four thirty, it feels burning nothing coming out at all "  " I went to [facility] yesterday, I had straight cath.

## 2021-08-06 NOTE — ED ADULT TRIAGE NOTE - NSPATIENTFLAG_GEN_A_ER
Purple DH (Discharge Huddle; Vulnerable Patient)
Patient with one or more new problems requiring additional work-up/treatment.

## 2021-08-07 ENCOUNTER — TRANSCRIPTION ENCOUNTER (OUTPATIENT)
Age: 31
End: 2021-08-07

## 2021-08-07 DIAGNOSIS — F39 UNSPECIFIED MOOD [AFFECTIVE] DISORDER: ICD-10-CM

## 2021-08-07 DIAGNOSIS — R33.9 RETENTION OF URINE, UNSPECIFIED: ICD-10-CM

## 2021-08-07 DIAGNOSIS — Z29.9 ENCOUNTER FOR PROPHYLACTIC MEASURES, UNSPECIFIED: ICD-10-CM

## 2021-08-07 DIAGNOSIS — Z02.9 ENCOUNTER FOR ADMINISTRATIVE EXAMINATIONS, UNSPECIFIED: ICD-10-CM

## 2021-08-07 DIAGNOSIS — E03.9 HYPOTHYROIDISM, UNSPECIFIED: ICD-10-CM

## 2021-08-07 DIAGNOSIS — I10 ESSENTIAL (PRIMARY) HYPERTENSION: ICD-10-CM

## 2021-08-07 LAB
A1C WITH ESTIMATED AVERAGE GLUCOSE RESULT: 6 % — HIGH (ref 4–5.6)
ALBUMIN SERPL ELPH-MCNC: 3.8 G/DL — SIGNIFICANT CHANGE UP (ref 3.5–5)
ALP SERPL-CCNC: 105 U/L — SIGNIFICANT CHANGE UP (ref 40–120)
ALT FLD-CCNC: 45 U/L DA — SIGNIFICANT CHANGE UP (ref 10–60)
ANION GAP SERPL CALC-SCNC: 4 MMOL/L — LOW (ref 5–17)
ANION GAP SERPL CALC-SCNC: 5 MMOL/L — SIGNIFICANT CHANGE UP (ref 5–17)
APPEARANCE UR: CLEAR — SIGNIFICANT CHANGE UP
AST SERPL-CCNC: 23 U/L — SIGNIFICANT CHANGE UP (ref 10–40)
BASOPHILS # BLD AUTO: 0.01 K/UL — SIGNIFICANT CHANGE UP (ref 0–0.2)
BASOPHILS # BLD AUTO: 0.03 K/UL — SIGNIFICANT CHANGE UP (ref 0–0.2)
BASOPHILS NFR BLD AUTO: 0.2 % — SIGNIFICANT CHANGE UP (ref 0–2)
BASOPHILS NFR BLD AUTO: 0.4 % — SIGNIFICANT CHANGE UP (ref 0–2)
BILIRUB SERPL-MCNC: 0.4 MG/DL — SIGNIFICANT CHANGE UP (ref 0.2–1.2)
BILIRUB UR-MCNC: NEGATIVE — SIGNIFICANT CHANGE UP
BUN SERPL-MCNC: 12 MG/DL — SIGNIFICANT CHANGE UP (ref 7–18)
BUN SERPL-MCNC: 17 MG/DL — SIGNIFICANT CHANGE UP (ref 7–18)
CALCIUM SERPL-MCNC: 8.7 MG/DL — SIGNIFICANT CHANGE UP (ref 8.4–10.5)
CALCIUM SERPL-MCNC: 8.8 MG/DL — SIGNIFICANT CHANGE UP (ref 8.4–10.5)
CHLORIDE SERPL-SCNC: 106 MMOL/L — SIGNIFICANT CHANGE UP (ref 96–108)
CHLORIDE SERPL-SCNC: 108 MMOL/L — SIGNIFICANT CHANGE UP (ref 96–108)
CHOLEST SERPL-MCNC: 50 MG/DL — SIGNIFICANT CHANGE UP
CO2 SERPL-SCNC: 26 MMOL/L — SIGNIFICANT CHANGE UP (ref 22–31)
CO2 SERPL-SCNC: 28 MMOL/L — SIGNIFICANT CHANGE UP (ref 22–31)
COLOR SPEC: YELLOW — SIGNIFICANT CHANGE UP
CREAT SERPL-MCNC: 0.7 MG/DL — SIGNIFICANT CHANGE UP (ref 0.5–1.3)
CREAT SERPL-MCNC: 0.76 MG/DL — SIGNIFICANT CHANGE UP (ref 0.5–1.3)
DIFF PNL FLD: NEGATIVE — SIGNIFICANT CHANGE UP
EOSINOPHIL # BLD AUTO: 0.1 K/UL — SIGNIFICANT CHANGE UP (ref 0–0.5)
EOSINOPHIL # BLD AUTO: 0.2 K/UL — SIGNIFICANT CHANGE UP (ref 0–0.5)
EOSINOPHIL NFR BLD AUTO: 2.2 % — SIGNIFICANT CHANGE UP (ref 0–6)
EOSINOPHIL NFR BLD AUTO: 2.5 % — SIGNIFICANT CHANGE UP (ref 0–6)
ESTIMATED AVERAGE GLUCOSE: 126 MG/DL — HIGH (ref 68–114)
GLUCOSE SERPL-MCNC: 140 MG/DL — HIGH (ref 70–99)
GLUCOSE SERPL-MCNC: 96 MG/DL — SIGNIFICANT CHANGE UP (ref 70–99)
GLUCOSE UR QL: NEGATIVE — SIGNIFICANT CHANGE UP
HCT VFR BLD CALC: 26.6 % — LOW (ref 39–50)
HCT VFR BLD CALC: 42.9 % — SIGNIFICANT CHANGE UP (ref 39–50)
HCT VFR BLD CALC: 44.3 % — SIGNIFICANT CHANGE UP (ref 39–50)
HDLC SERPL-MCNC: 15 MG/DL — LOW
HGB BLD-MCNC: 13.4 G/DL — SIGNIFICANT CHANGE UP (ref 13–17)
HGB BLD-MCNC: 13.6 G/DL — SIGNIFICANT CHANGE UP (ref 13–17)
HGB BLD-MCNC: 7.9 G/DL — LOW (ref 13–17)
IMM GRANULOCYTES NFR BLD AUTO: 0.2 % — SIGNIFICANT CHANGE UP (ref 0–1.5)
IMM GRANULOCYTES NFR BLD AUTO: 0.4 % — SIGNIFICANT CHANGE UP (ref 0–1.5)
KETONES UR-MCNC: NEGATIVE — SIGNIFICANT CHANGE UP
LEUKOCYTE ESTERASE UR-ACNC: NEGATIVE — SIGNIFICANT CHANGE UP
LIPID PNL WITH DIRECT LDL SERPL: 23 MG/DL — SIGNIFICANT CHANGE UP
LYMPHOCYTES # BLD AUTO: 1.12 K/UL — SIGNIFICANT CHANGE UP (ref 1–3.3)
LYMPHOCYTES # BLD AUTO: 2.57 K/UL — SIGNIFICANT CHANGE UP (ref 1–3.3)
LYMPHOCYTES # BLD AUTO: 24.6 % — SIGNIFICANT CHANGE UP (ref 13–44)
LYMPHOCYTES # BLD AUTO: 31.7 % — SIGNIFICANT CHANGE UP (ref 13–44)
MAGNESIUM SERPL-MCNC: 0.8 MG/DL — CRITICAL LOW (ref 1.6–2.6)
MCHC RBC-ENTMCNC: 23.6 PG — LOW (ref 27–34)
MCHC RBC-ENTMCNC: 23.7 PG — LOW (ref 27–34)
MCHC RBC-ENTMCNC: 23.7 PG — LOW (ref 27–34)
MCHC RBC-ENTMCNC: 29.7 GM/DL — LOW (ref 32–36)
MCHC RBC-ENTMCNC: 30.7 GM/DL — LOW (ref 32–36)
MCHC RBC-ENTMCNC: 31.2 GM/DL — LOW (ref 32–36)
MCV RBC AUTO: 75.5 FL — LOW (ref 80–100)
MCV RBC AUTO: 77 FL — LOW (ref 80–100)
MCV RBC AUTO: 79.6 FL — LOW (ref 80–100)
MONOCYTES # BLD AUTO: 0.31 K/UL — SIGNIFICANT CHANGE UP (ref 0–0.9)
MONOCYTES # BLD AUTO: 0.66 K/UL — SIGNIFICANT CHANGE UP (ref 0–0.9)
MONOCYTES NFR BLD AUTO: 6.8 % — SIGNIFICANT CHANGE UP (ref 2–14)
MONOCYTES NFR BLD AUTO: 8.1 % — SIGNIFICANT CHANGE UP (ref 2–14)
NEUTROPHILS # BLD AUTO: 3 K/UL — SIGNIFICANT CHANGE UP (ref 1.8–7.4)
NEUTROPHILS # BLD AUTO: 4.62 K/UL — SIGNIFICANT CHANGE UP (ref 1.8–7.4)
NEUTROPHILS NFR BLD AUTO: 57.1 % — SIGNIFICANT CHANGE UP (ref 43–77)
NEUTROPHILS NFR BLD AUTO: 65.8 % — SIGNIFICANT CHANGE UP (ref 43–77)
NITRITE UR-MCNC: NEGATIVE — SIGNIFICANT CHANGE UP
NON HDL CHOLESTEROL: 35 MG/DL — SIGNIFICANT CHANGE UP
NRBC # BLD: 0 /100 WBCS — SIGNIFICANT CHANGE UP (ref 0–0)
PH UR: 7 — SIGNIFICANT CHANGE UP (ref 5–8)
PHOSPHATE SERPL-MCNC: 0.9 MG/DL — CRITICAL LOW (ref 2.5–4.5)
PLATELET # BLD AUTO: 144 K/UL — LOW (ref 150–400)
PLATELET # BLD AUTO: 249 K/UL — SIGNIFICANT CHANGE UP (ref 150–400)
PLATELET # BLD AUTO: 257 K/UL — SIGNIFICANT CHANGE UP (ref 150–400)
POTASSIUM SERPL-MCNC: 4 MMOL/L — SIGNIFICANT CHANGE UP (ref 3.5–5.3)
POTASSIUM SERPL-MCNC: 4.3 MMOL/L — SIGNIFICANT CHANGE UP (ref 3.5–5.3)
POTASSIUM SERPL-SCNC: 4 MMOL/L — SIGNIFICANT CHANGE UP (ref 3.5–5.3)
POTASSIUM SERPL-SCNC: 4.3 MMOL/L — SIGNIFICANT CHANGE UP (ref 3.5–5.3)
PROT SERPL-MCNC: 6.9 G/DL — SIGNIFICANT CHANGE UP (ref 6–8.3)
PROT UR-MCNC: NEGATIVE — SIGNIFICANT CHANGE UP
RBC # BLD: 3.34 M/UL — LOW (ref 4.2–5.8)
RBC # BLD: 5.68 M/UL — SIGNIFICANT CHANGE UP (ref 4.2–5.8)
RBC # BLD: 5.75 M/UL — SIGNIFICANT CHANGE UP (ref 4.2–5.8)
RBC # FLD: 14.5 % — SIGNIFICANT CHANGE UP (ref 10.3–14.5)
RBC # FLD: 14.5 % — SIGNIFICANT CHANGE UP (ref 10.3–14.5)
RBC # FLD: 14.6 % — HIGH (ref 10.3–14.5)
SARS-COV-2 RNA SPEC QL NAA+PROBE: SIGNIFICANT CHANGE UP
SODIUM SERPL-SCNC: 138 MMOL/L — SIGNIFICANT CHANGE UP (ref 135–145)
SODIUM SERPL-SCNC: 139 MMOL/L — SIGNIFICANT CHANGE UP (ref 135–145)
SP GR SPEC: 1.01 — SIGNIFICANT CHANGE UP (ref 1.01–1.02)
T3FREE SERPL-MCNC: 2.24 PG/ML — SIGNIFICANT CHANGE UP (ref 1.8–4.6)
T4 FREE SERPL-MCNC: 0.9 NG/DL — SIGNIFICANT CHANGE UP (ref 0.9–1.8)
TRIGL SERPL-MCNC: 62 MG/DL — SIGNIFICANT CHANGE UP
TSH SERPL-MCNC: 0.54 UU/ML — SIGNIFICANT CHANGE UP (ref 0.34–4.82)
UROBILINOGEN FLD QL: NEGATIVE — SIGNIFICANT CHANGE UP
WBC # BLD: 4.56 K/UL — SIGNIFICANT CHANGE UP (ref 3.8–10.5)
WBC # BLD: 7.28 K/UL — SIGNIFICANT CHANGE UP (ref 3.8–10.5)
WBC # BLD: 8.1 K/UL — SIGNIFICANT CHANGE UP (ref 3.8–10.5)
WBC # FLD AUTO: 4.56 K/UL — SIGNIFICANT CHANGE UP (ref 3.8–10.5)
WBC # FLD AUTO: 7.28 K/UL — SIGNIFICANT CHANGE UP (ref 3.8–10.5)
WBC # FLD AUTO: 8.1 K/UL — SIGNIFICANT CHANGE UP (ref 3.8–10.5)

## 2021-08-07 PROCEDURE — 99222 1ST HOSP IP/OBS MODERATE 55: CPT

## 2021-08-07 RX ORDER — SODIUM CHLORIDE 9 MG/ML
1000 INJECTION INTRAMUSCULAR; INTRAVENOUS; SUBCUTANEOUS
Refills: 0 | Status: DISCONTINUED | OUTPATIENT
Start: 2021-08-07 | End: 2021-08-10

## 2021-08-07 RX ORDER — CEFTRIAXONE 500 MG/1
2000 INJECTION, POWDER, FOR SOLUTION INTRAMUSCULAR; INTRAVENOUS EVERY 24 HOURS
Refills: 0 | Status: DISCONTINUED | OUTPATIENT
Start: 2021-08-08 | End: 2021-08-08

## 2021-08-07 RX ORDER — QUETIAPINE FUMARATE 200 MG/1
200 TABLET, FILM COATED ORAL DAILY
Refills: 0 | Status: DISCONTINUED | OUTPATIENT
Start: 2021-08-07 | End: 2021-08-10

## 2021-08-07 RX ORDER — ACETAMINOPHEN 500 MG
975 TABLET ORAL EVERY 8 HOURS
Refills: 0 | Status: DISCONTINUED | OUTPATIENT
Start: 2021-08-07 | End: 2021-08-10

## 2021-08-07 RX ORDER — MAGNESIUM SULFATE 500 MG/ML
2 VIAL (ML) INJECTION ONCE
Refills: 0 | Status: COMPLETED | OUTPATIENT
Start: 2021-08-07 | End: 2021-08-07

## 2021-08-07 RX ORDER — CEFTRIAXONE 500 MG/1
1000 INJECTION, POWDER, FOR SOLUTION INTRAMUSCULAR; INTRAVENOUS EVERY 24 HOURS
Refills: 0 | Status: DISCONTINUED | OUTPATIENT
Start: 2021-08-07 | End: 2021-08-07

## 2021-08-07 RX ORDER — FINASTERIDE 5 MG/1
5 TABLET, FILM COATED ORAL DAILY
Refills: 0 | Status: DISCONTINUED | OUTPATIENT
Start: 2021-08-07 | End: 2021-08-10

## 2021-08-07 RX ORDER — PANTOPRAZOLE SODIUM 20 MG/1
40 TABLET, DELAYED RELEASE ORAL
Refills: 0 | Status: DISCONTINUED | OUTPATIENT
Start: 2021-08-07 | End: 2021-08-10

## 2021-08-07 RX ORDER — LEVOTHYROXINE SODIUM 125 MCG
50 TABLET ORAL DAILY
Refills: 0 | Status: DISCONTINUED | OUTPATIENT
Start: 2021-08-07 | End: 2021-08-10

## 2021-08-07 RX ORDER — TAMSULOSIN HYDROCHLORIDE 0.4 MG/1
0.4 CAPSULE ORAL AT BEDTIME
Refills: 0 | Status: DISCONTINUED | OUTPATIENT
Start: 2021-08-07 | End: 2021-08-10

## 2021-08-07 RX ORDER — CHLORPROMAZINE HCL 10 MG
50 TABLET ORAL EVERY 6 HOURS
Refills: 0 | Status: DISCONTINUED | OUTPATIENT
Start: 2021-08-07 | End: 2021-08-10

## 2021-08-07 RX ORDER — VALPROIC ACID (AS SODIUM SALT) 250 MG/5ML
1000 SOLUTION, ORAL ORAL ONCE
Refills: 0 | Status: COMPLETED | OUTPATIENT
Start: 2021-08-07 | End: 2021-08-07

## 2021-08-07 RX ORDER — VALPROIC ACID (AS SODIUM SALT) 250 MG/5ML
1000 SOLUTION, ORAL ORAL
Refills: 0 | Status: DISCONTINUED | OUTPATIENT
Start: 2021-08-07 | End: 2021-08-07

## 2021-08-07 RX ORDER — VALPROIC ACID (AS SODIUM SALT) 250 MG/5ML
250 SOLUTION, ORAL ORAL THREE TIMES A DAY
Refills: 0 | Status: DISCONTINUED | OUTPATIENT
Start: 2021-08-07 | End: 2021-08-07

## 2021-08-07 RX ORDER — CEFTRIAXONE 500 MG/1
1000 INJECTION, POWDER, FOR SOLUTION INTRAMUSCULAR; INTRAVENOUS ONCE
Refills: 0 | Status: DISCONTINUED | OUTPATIENT
Start: 2021-08-07 | End: 2021-08-07

## 2021-08-07 RX ORDER — QUETIAPINE FUMARATE 200 MG/1
50 TABLET, FILM COATED ORAL EVERY 6 HOURS
Refills: 0 | Status: DISCONTINUED | OUTPATIENT
Start: 2021-08-07 | End: 2021-08-10

## 2021-08-07 RX ORDER — ENOXAPARIN SODIUM 100 MG/ML
40 INJECTION SUBCUTANEOUS DAILY
Refills: 0 | Status: DISCONTINUED | OUTPATIENT
Start: 2021-08-07 | End: 2021-08-10

## 2021-08-07 RX ORDER — ATORVASTATIN CALCIUM 80 MG/1
10 TABLET, FILM COATED ORAL AT BEDTIME
Refills: 0 | Status: DISCONTINUED | OUTPATIENT
Start: 2021-08-07 | End: 2021-08-10

## 2021-08-07 RX ORDER — VALPROIC ACID (AS SODIUM SALT) 250 MG/5ML
1000 SOLUTION, ORAL ORAL
Refills: 0 | Status: DISCONTINUED | OUTPATIENT
Start: 2021-08-07 | End: 2021-08-10

## 2021-08-07 RX ORDER — PHENAZOPYRIDINE HCL 100 MG
200 TABLET ORAL EVERY 8 HOURS
Refills: 0 | Status: DISCONTINUED | OUTPATIENT
Start: 2021-08-07 | End: 2021-08-10

## 2021-08-07 RX ORDER — CEFTRIAXONE 500 MG/1
1000 INJECTION, POWDER, FOR SOLUTION INTRAMUSCULAR; INTRAVENOUS ONCE
Refills: 0 | Status: COMPLETED | OUTPATIENT
Start: 2021-08-07 | End: 2021-08-07

## 2021-08-07 RX ORDER — LIDOCAINE 4 G/100G
1 CREAM TOPICAL THREE TIMES A DAY
Refills: 0 | Status: COMPLETED | OUTPATIENT
Start: 2021-08-07 | End: 2021-08-09

## 2021-08-07 RX ADMIN — Medication 0.2 MILLIGRAM(S): at 21:29

## 2021-08-07 RX ADMIN — CEFTRIAXONE 100 MILLIGRAM(S): 500 INJECTION, POWDER, FOR SOLUTION INTRAMUSCULAR; INTRAVENOUS at 16:23

## 2021-08-07 RX ADMIN — PANTOPRAZOLE SODIUM 40 MILLIGRAM(S): 20 TABLET, DELAYED RELEASE ORAL at 07:00

## 2021-08-07 RX ADMIN — FINASTERIDE 5 MILLIGRAM(S): 5 TABLET, FILM COATED ORAL at 12:36

## 2021-08-07 RX ADMIN — Medication 975 MILLIGRAM(S): at 21:29

## 2021-08-07 RX ADMIN — SODIUM CHLORIDE 70 MILLILITER(S): 9 INJECTION INTRAMUSCULAR; INTRAVENOUS; SUBCUTANEOUS at 07:01

## 2021-08-07 RX ADMIN — LIDOCAINE 1 APPLICATION(S): 4 CREAM TOPICAL at 09:46

## 2021-08-07 RX ADMIN — CEFTRIAXONE 100 MILLIGRAM(S): 500 INJECTION, POWDER, FOR SOLUTION INTRAMUSCULAR; INTRAVENOUS at 07:00

## 2021-08-07 RX ADMIN — SODIUM CHLORIDE 70 MILLILITER(S): 9 INJECTION INTRAMUSCULAR; INTRAVENOUS; SUBCUTANEOUS at 11:45

## 2021-08-07 RX ADMIN — Medication 50 MICROGRAM(S): at 07:00

## 2021-08-07 RX ADMIN — Medication 1000 MILLIGRAM(S): at 15:08

## 2021-08-07 RX ADMIN — ATORVASTATIN CALCIUM 10 MILLIGRAM(S): 80 TABLET, FILM COATED ORAL at 21:29

## 2021-08-07 RX ADMIN — LIDOCAINE 1 APPLICATION(S): 4 CREAM TOPICAL at 16:24

## 2021-08-07 RX ADMIN — Medication 975 MILLIGRAM(S): at 21:59

## 2021-08-07 RX ADMIN — ENOXAPARIN SODIUM 40 MILLIGRAM(S): 100 INJECTION SUBCUTANEOUS at 12:36

## 2021-08-07 RX ADMIN — TAMSULOSIN HYDROCHLORIDE 0.4 MILLIGRAM(S): 0.4 CAPSULE ORAL at 21:29

## 2021-08-07 RX ADMIN — Medication 200 MILLIGRAM(S): at 13:06

## 2021-08-07 RX ADMIN — QUETIAPINE FUMARATE 200 MILLIGRAM(S): 200 TABLET, FILM COATED ORAL at 12:36

## 2021-08-07 RX ADMIN — Medication 200 MILLIGRAM(S): at 21:29

## 2021-08-07 RX ADMIN — Medication 50 GRAM(S): at 15:06

## 2021-08-07 NOTE — ED PROVIDER NOTE - PROGRESS NOTE DETAILS
450cc urine on bladder scan. pt unable to urinate. mckeon placed by nursing. spoke with pt and staff from his facility- states that they are unable to care for him when he has a mckeon in place and the patient states unable to take care of the mckeon. pt was admitted for this for the same on last admission- will admit again, although ultimately solution must be found as pt can't be admitted unnecessarily each time he has retention which is very frequently.

## 2021-08-07 NOTE — DISCHARGE NOTE PROVIDER - HOSPITAL COURSE
Pt is a 30 yo M from [facility] group [place] with PMH of Autism Spectrum disorder, Intellectual disability, ADHD, mood disorder and Asthma came to the ED with chief complaints of abdominal pain and urinary retention. Based on a [facility] volunteer, yesterday morning pt was complaining of some abdominal pain and he hadn't urinated since the previous night. When asked pt, he said he has diffuse abdominal pain and back pain. No h/o CP, SOB, headaches, dizziness, N/V. Pt was admitted to [facility] on Aug 3rd,2021 for urinary retention and discharged with a mckeon catheter, but at the group home they are not able to care for the catheter care and pt gets very agitated about it.     Patient s/p recent discharge on 8/4/2021 from [facility] and he has been having difficulty with managing his indwelling mckeon catheter, along with the staff at his group home being unable to assist with emptying the bag resulting in patient becoming severely agitated.  As per ED triage note, pt was also evaluated at [facility] on 8/5 and needed to be straight cath.      In ED, pt was noted to be in retention and had a mckeon placed drain 450 and received Rocephin empirically for 3 days and was started on his home dose of Flomax and Pyridium.      Pt was subsequently admitted for urinary retention and SW consult.    Please note that this a brief summary of hospital course, please refer to daily progress notes and consult notes for full course and events.     Pt is a 30 yo M from [facility] group [place] with PMH of Autism Spectrum disorder, Intellectual disability, ADHD, mood disorder and Asthma came to the ED with chief complaints of abdominal pain and urinary retention. Based on a [facility] volunteer, yesterday morning pt was complaining of some abdominal pain and he hadn't urinated since the previous night. When asked pt, he said he has diffuse abdominal pain and back pain. No h/o CP, SOB, headaches, dizziness, N/V. Pt was admitted to [facility] on Aug 3rd,2021 for urinary retention and discharged with a mckeon catheter, but at the group home they are not able to care for the catheter care and pt gets very agitated about it.     Patient s/p recent discharge on 8/4/2021 from [facility] and he has been having difficulty with managing his indwelling mckeon catheter, along with the staff at his group home being unable to assist with emptying the bag resulting in patient becoming severely agitated.  As per ED triage note, pt was also evaluated at [facility] on 8/5 and needed to be straight cath.      In ED, pt was noted to be in retention and had a mckeon placed drain 450 and received Rocephin empirically for 3 days and was started on his home dose of Flomax and Pyridium.      Pt was subsequently admitted for urinary retention and SW consult.    Please update note with psych recs.    Please note that this a brief summary of hospital course, please refer to daily progress notes and consult notes for full course and events.     Pt is a 30 yo M from [facility] group home with PMH of Autism Spectrum disorder, Intellectual disability, ADHD, mood disorder and Asthma came to the ED with chief complaints of abdominal pain and urinary retention. Based on a [facility] volunteer, yesterday morning pt was complaining of some abdominal pain and he hadn't urinated since the previous night. When asked pt, he said he has diffuse abdominal pain and back pain. No h/o CP, SOB, headaches, dizziness, N/V. Pt was admitted to [facility] on Aug 3rd,2021 for urinary retention and discharged with a mckeon catheter, but at the group home they are not able to care for the catheter care and pt gets very agitated about it.     Patient s/p recent discharge on 8/4/2021 from [facility] and he has been having difficulty with managing his indwelling mckeon catheter, along with the staff at his group home being unable to assist with emptying the bag resulting in patient becoming severely agitated.  As per ED triage note, pt was also evaluated at [facility] on 8/5 and needed to be straight cath.      In ED, pt was noted to be in retention and had a mckeon placed drain 450 and received Rocephin empirically for 3 days and was started on his home dose of Flomax and Pyridium.      Pt was subsequently admitted for urinary retention and SW consult. Urology was consulted. US renal ruled out hydronephrosis. DC mckeon. As per attending, Patient Is clinically stable to be discharged. Pt is a 32 yo M from [facility] group home with PMH of Autism Spectrum disorder, Intellectual disability, ADHD, mood disorder and Asthma came to the ED with chief complaints of abdominal pain and urinary retention. Based on a [facility] volunteer, yesterday morning pt was complaining of some abdominal pain and he hadn't urinated since the previous night. When asked pt, he said he has diffuse abdominal pain and back pain. No h/o CP, SOB, headaches, dizziness, N/V. Pt was admitted to [facility] on Aug 3rd,2021 for urinary retention and discharged with a mckeon catheter, but at the group home they are not able to care for the catheter care and pt gets very agitated about it.     Patient s/p recent discharge on 8/4/2021 from [facility] and he has been having difficulty with managing his indwelling mckeon catheter, along with the staff at his group home being unable to assist with emptying the bag resulting in patient becoming severely agitated.  As per ED triage note, pt was also evaluated at [facility] on 8/5 and needed to be straight cath.      In ED, pt was noted to be in retention and had a mckeon placed drain 450 and received Rocephin empirically for 3 days and was started on his home dose of Flomax and Pyridium.      Pt was subsequently admitted for urinary retention and SW consult. Urology was consulted. US renal ruled out hydronephrosis. DC mckeon. As per attending, Patient Is clinically stable to be discharged.    Addendum:   Pt seen and examined. Pt is awaiting transportation for discharge to his group home. Pt had presented with abdominal pain due to urinary retention. He is sp mckeon insertion on admission which has been removed. He is now voiding freely with no complaints. Urology was consulted. Pt will require frequent reminders to urinate. Pt to follow up with urology on discharge. If symptoms persist, pt to have CT abdomen/pelvis.   Dr Le.

## 2021-08-07 NOTE — ED PROVIDER NOTE - ENMT NEGATIVE STATEMENT, MLM
Ears: no ear pain and no hearing problems. Nose: no nasal congestion and no nasal drainage. Mouth/Throat: no dysphagia, no hoarseness and no throat pain. Neck: no lumps, no pain, no stiffness and no swollen glands.
mildly/restricted

## 2021-08-07 NOTE — DISCHARGE NOTE PROVIDER - NSDCCPCAREPLAN_GEN_ALL_CORE_FT
PRINCIPAL DISCHARGE DIAGNOSIS  Diagnosis: Urinary retention  Assessment and Plan of Treatment: You were evaluated for urinary retention/inability to empty your bladder.  Make sure to follow up with urology as outpatient and report any symptoms of increasing abdominal or suprapubic pain with inability to urinate despite appropriate fluid intake.  Report any back or flank pain, bloody urine.      SECONDARY DISCHARGE DIAGNOSES  Diagnosis: Mood disorder  Assessment and Plan of Treatment: Please continue taking your medication as prescribed by your doctor. Make sure to report any changes in your condition such as thoughts of harming yourself or others, visual or auditory hallucination.     PRINCIPAL DISCHARGE DIAGNOSIS  Diagnosis: Urinary retention  Assessment and Plan of Treatment: You were evaluated for urinary retention/inability to empty your bladder.  US renal didn't show hydronephrosis. Make sure to follow up with urology as outpatient and report any symptoms of increasing abdominal or suprapubic pain with inability to urinate despite appropriate fluid intake.  Report any back or flank pain, bloody urine.      SECONDARY DISCHARGE DIAGNOSES  Diagnosis: Mood disorder  Assessment and Plan of Treatment: Please continue taking your medication as prescribed by your doctor. Make sure to report any changes in your condition such as thoughts of harming yourself or others, visual or auditory hallucination.    Diagnosis: Hypothyroidism  Assessment and Plan of Treatment: Please continue on your medications as prescribed and follow up with your doctor.

## 2021-08-07 NOTE — ED PROVIDER NOTE - OBJECTIVE STATEMENT
31 year old male PMh MR, autism, GERD, hyperthyroid, factitious disorder coming in with urinary retention. pt states since about 430pm unable to urinate. was recently discharged 2 days ago for the same where he had a mckeon placed and then removed by urology. states since then still with difficulty urinating.

## 2021-08-07 NOTE — CHART NOTE - NSCHARTNOTEFT_GEN_A_CORE
ask to see pt as he is constantly requesting "every 5 minutes" to have catheter remove  Pt seen, slightly agitated (unlike earlier in ED) wants to "poop without this thing"; does not want the catheter. Not receiving reason for mckeon placement (urinary retention), threatening to pull it out himself. Spoke with Pt's mother via documented telephone # on file: Inquired on reason for 1:1 at group home; as per pt's Mom pt "will walk out, and he cuts himself"    A/P  32 yo M from [facility] group home with PMH of Autism Spectrum disorder, Intellectual disability, ADHD, mood disorder and Asthma came to the ED with chief complaints of abdominal pain and urinary retention now with escalating agitation    Chart review noted that pt is on depakene 1000mg bid, he has not received any dosing for today, will dose now  Telepsych has been contacted to see pt

## 2021-08-07 NOTE — ED BEHAVIORAL HEALTH NOTE - BEHAVIORAL HEALTH NOTE
30yo M domiciled at group home, w/ hx of ASD, ID, ADHD with med hx of asthma, urinary retention who had been recently discharged back to group home w/ mckeon catheter but was sent back to ED as group home unable to accommodate mckeon cath pts. Dr Funes called for routine consult for management of agitation; pt also placed on 1:1 as reported hx of SIB and aggression at the group home.      Chart Reviewed. EKG 5/11: Qtc 415      Recs:  -continue 1:1 until full psych evaluation to be completed on 8/8/21  -continue home meds of:  Depakene 1000mg po BID  Seroquel 200mg po qdaily -hold if QTC>500    -add prn:  mild agitation: seroquel 50mg po q6hr prn mild agitation -hold if qtC>500  severe agitation: thorazine 50mg IM q6hr prn severe agitation - if not responsive to po seroquel or unable to take po meds; hold if qtC>500    -get VPA level in am  -get EKG to monitor QTc  -telepsych CL to complete full evaluation on 8/8/21  -Dr Funes made aware of plan and recs - in agreement.

## 2021-08-07 NOTE — ED ADULT NURSE NOTE - OBJECTIVE STATEMENT
pt came in c/o urinary retention , bladder scan shows 366 ml of urine in the bladder, DR Gomez notified pt came in c/o urinary retention , bladder scan shows 366 ml of urine in the bladder, DR Gomez notified  mckeon cath placed , output ~ 400 ml of clear yellow urine

## 2021-08-07 NOTE — H&P ADULT - PROBLEM SELECTOR PLAN 1
Pt p/w urinary retentionj  -Currently awaiting UA in ED  -Patient endorses dysuria as well as suprapubic pain  -Patient also endorses inability to pass urine unless mckeon placed, though paradoxically, historically upon returning to his adult home, he becomes "anxious" (as per aide at bedside) about having the mckeon in place as well as how to drain it  -Will resume Flomax and Pyridium Pt p/w urinary retention  Vitals stable  PE: Diffuse abdominal tenderness ?fictitious  White cont 8k, UA negative  In ED pt had 450cc urinary retention  Sarabia placed  empirically started on rocephin  started on IVF  started on home meds of Flomax and Pyridium

## 2021-08-07 NOTE — ED ADULT NURSE NOTE - CHIEF COMPLAINT QUOTE
" I can't go since four thirty, it feels burning nothing coming out at all "  " I went to [facility] yesterday, I had straight cath.

## 2021-08-07 NOTE — DISCHARGE NOTE PROVIDER - PROVIDER TOKENS
PROVIDER:[TOKEN:[[id number]]] PROVIDER:[TOKEN:[[id number]]],PROVIDER:[TOKEN:[[id number]]]

## 2021-08-07 NOTE — ED ADULT NURSE NOTE - NSIMPLEMENTINTERV_GEN_ALL_ED
Implemented All Fall Risk Interventions:  [place] to call system. Call bell, personal items and telephone within reach. Instruct patient to call for assistance. Room bathroom lighting operational. Non-slip footwear when patient is off stretcher. Physically safe environment: no spills, clutter or unnecessary equipment. Stretcher in lowest position, wheels locked, appropriate side rails in place. Provide visual cue, wrist band, yellow gown, etc. Monitor gait and stability. Monitor for mental status changes and reorient to person, place, and time. Review medications for side effects contributing to fall risk. Reinforce activity limits and safety measures with patient and family.

## 2021-08-07 NOTE — DISCHARGE NOTE PROVIDER - NSDCMRMEDTOKEN_GEN_ALL_CORE_FT
cloNIDine 0.1 mg oral tablet: 2 tab(s) orally once a day (at bedtime)  Depakene 250 mg/5 mL oral liquid: 20 milliliter(s) orally 2 times a day  finasteride 5 mg oral tablet: 1 tab(s) orally once a day  levothyroxine 50 mcg (0.05 mg) oral tablet: 1 tab(s) orally once a day  pantoprazole 20 mg oral delayed release tablet: 1 tab(s) orally once a day  phenazopyridine 200 mg oral tablet: 1 tab(s) orally every 8 hours  pravastatin 20 mg oral tablet: 1 tab(s) orally once a day  SEROquel 200 mg oral tablet: 1 tab(s) orally once a day  tamsulosin 0.4 mg oral capsule: 1 cap(s) orally once a day (at bedtime)  Vitamin D2 50,000 intl units (1.25 mg) oral capsule: 1 cap(s) orally once a week   cloNIDine 0.1 mg oral tablet: 2 tab(s) orally once a day (at bedtime)  Depakene 250 mg/5 mL oral liquid: 20 milliliter(s) orally 2 times a day  finasteride 5 mg oral tablet: 1 tab(s) orally once a day  levothyroxine 50 mcg (0.05 mg) oral tablet: 1 tab(s) orally once a day  pantoprazole 20 mg oral delayed release tablet: 1 tab(s) orally once a day  phenazopyridine 200 mg oral tablet: 1 tab(s) orally every 8 hours  pravastatin 20 mg oral tablet: 1 tab(s) orally once a day  tamsulosin 0.4 mg oral capsule: 1 cap(s) orally once a day (at bedtime)  Vitamin D2 50,000 intl units (1.25 mg) oral capsule: 1 cap(s) orally once a week   cloNIDine 0.1 mg oral tablet: 2 tab(s) orally once a day (at bedtime)  Depakene 250 mg/5 mL oral liquid: 20 milliliter(s) orally 2 times a day  finasteride 5 mg oral tablet: 1 tab(s) orally once a day  levothyroxine 50 mcg (0.05 mg) oral tablet: 1 tab(s) orally once a day  pantoprazole 20 mg oral delayed release tablet: 1 tab(s) orally once a day  phenazopyridine 200 mg oral tablet: 1 tab(s) orally every 8 hours  pravastatin 20 mg oral tablet: 1 tab(s) orally once a day  QUEtiapine 200 mg oral tablet: 1 tab(s) orally once a day  QUEtiapine 50 mg oral tablet: 1 tab(s) orally every 6 hours, As needed, severe agitaion  tamsulosin 0.4 mg oral capsule: 1 cap(s) orally once a day (at bedtime)  Vitamin D2 50,000 intl units (1.25 mg) oral capsule: 1 cap(s) orally once a week

## 2021-08-07 NOTE — CHART NOTE - NSCHARTNOTEFT_GEN_A_CORE
Pt seen in [facility] on rounds, requesting to have mckeon removed due to "pain, it hurts"; denies abdominal pain    Brief Exam  General: NAD  abd; Soft NTND, +BS  : mckeon cath in place draining yellow urine, no suprapubic tenderness; no penile discharge    Vital Signs Last 24 Hrs  T(C): 36.4 (07 Aug 2021 07:15), Max: 36.7 (07 Aug 2021 05:33)  T(F): 97.5 (07 Aug 2021 07:15), Max: 98 (07 Aug 2021 05:33)  HR: 63 (07 Aug 2021 07:15) (63 - 72)  BP: 121/69 (07 Aug 2021 07:15) (114/74 - 121/85)  BP(mean): --  RR: 17 (07 Aug 2021 07:15) (16 - 17)  SpO2: 98% (07 Aug 2021 07:15) (97% - 98%)      LABS                        13.6   8.10  )-----------( 249      ( 07 Aug 2021 03:38 )             44.3     08-    138  |  106  |  17  ----------------------------<  96  4.3   |  28  |  0.76    Ca    8.8      07 Aug 2021 03:38    TPro  6.9  /  Alb  3.8  /  TBili  0.4  /  DBili  x   /  AST  23  /  ALT  45  /  AlkPhos  105  08-07    LIVER FUNCTIONS - ( 07 Aug 2021 03:38 )  Alb: 3.8 g/dL / Pro: 6.9 g/dL / ALK PHOS: 105 U/L / ALT: 45 U/L DA / AST: 23 U/L / GGT: x             Urinalysis Basic - ( 07 Aug 2021 04:16 )    Color: Yellow / Appearance: Clear / S.010 / pH: x  Gluc: x / Ketone: Negative  / Bili: Negative / Urobili: Negative   Blood: x / Protein: Negative / Nitrite: Negative   Leuk Esterase: Negative / RBC: x / WBC x   Sq Epi: x / Non Sq Epi: x / Bacteria: x    Blood, Urine: Negative ( @ 04:16)      A/P    32 yo Man from [facility] home with PMH of Autism Spectrum disorder, Intellectual disability, ADHD, mood disorder and Asthma came to the ED with chief complaints of abdominal pain and urinary retention. Admitted for urinary retention    penile pain d/t mckeon insertion  lidocaine gel to tip of penis   tylenol 975mg Q8hrs prn

## 2021-08-07 NOTE — H&P ADULT - HISTORY OF PRESENT ILLNESS
Pt is a 30 yo M from [facility] with PMH of Autism Spectrum disorder, Intellectual disability, ADHD, mood disorder and Asthma came to the ED with chief complaints of abdominal pain and urinary retention. Based on a [facility] volunteer, yesterday morning pt was complaining of some abdominal pain and he hadn't urinated since the previous night. When asked pt, he said he has diffuse abdominal pain and back pain. No h/o CP, SOB, headaches, dizziness, N/V. Pt was admitted to [facility] on Aug 3rd,2021 for urinary retention and discharged with a mckeon catheter, but at the group home they are not able to care for the catheter care and pt gets very agitated about it.

## 2021-08-07 NOTE — H&P ADULT - PROBLEM SELECTOR PLAN 5
IMPROVE VTE Individual Risk Assessment  RISK                                                                Points  [  ] Previous VTE                                                  3  [  ] Thrombophilia                                               2  [  ] Lower limb paralysis                                      2        (unable to hold up >15 seconds)    [  ] Current Cancer                                              2         (within 6 months)  [x  ] Immobilization > 24 hrs                                1  [  ] ICU/CCU stay > 24 hours                              1  [  ] Age > 60                                                      1  IMPROVE VTE Score _________1,   lovenox sq for DVT proph

## 2021-08-07 NOTE — ED ADULT NURSE NOTE - CAS DISCH ACCOMP BY
PCP: Rivka Pulido DO  Medication: meloxicam (MOBIC) 15 MG tablet  Last office visit: 4/29/2021  Next visit scheduled: 6/14/2021      Left a message for patient to return call    Transport

## 2021-08-07 NOTE — CHART NOTE - NSCHARTNOTEFT_GEN_A_CORE
Receive call from RN that pt's group home called informing staff that pt has been on 1:1 monitoring for "suicidal ideations, harm to self and others"; pt was seen several times throughout the morning in the ED, has been and still is calm, denies SI, HI but acknowledged he is usually on 1:1 at the group home. Given report received will place 1:1 pending psych evaluation. Pt with hx of  Autism Spectrum disorder, Intellectual disability, ADHD, mood disorder.

## 2021-08-07 NOTE — H&P ADULT - ATTENDING COMMENTS
Patient is a 31 year old male with a PMH of HTN, HLD, Development Delay, Autism, Intellectual Disability, Impulse and Mood disorder, Factitious disorder, Prior Psych Admissions who was BIBEMS due to urinary retention.  Patient states that s/p his recent discharge from [facility] on 8/4/2021, he has been having difficulty with managing his indwelling mckeon catheter, along with the staff at his group home being unable to assist with emptying the bag results in patient becoming severely agitated.  As a result of patient's agitation, patient returned to [facility] ED for further management.    Of note, patient was recently admitted to [facility] on 8/3/2021 for Urinary retention and has been either seen in ED and/or admitted previously for urinary retention.    At time of examination in the ED, patient endorses dysuria as well as suprapubic pain.  However, patient denies any headache, dizziness, chest pain, palpitations, shortness of breath, nausea/vomiting/diarrhea.    T(C): 36.3 (08-06-21 @ 23:16), Max: 36.3 (08-06-21 @ 23:16)  T(F): 97.3 (08-06-21 @ 23:16), Max: 97.3 (08-06-21 @ 23:16)  HR: 72 (08-06-21 @ 23:16) (72 - 72)  BP: 121/85 (08-06-21 @ 23:16) (121/85 - 121/85)  RR: 16 (08-06-21 @ 23:16) (16 - 16)  SpO2: 98% (08-06-21 @ 23:16) (98% - 98%)  Wt(kg): --    P/E: As above MAR    A/P:    Urinary Retention:  -Currently awaiting UA in ED  -Patient endorses dysuria as well as suprapubic pain  -Patient also endorses inability to pass urine unless mckeon placed, though paradoxically, historically upon returning to his adult home, he becomes "anxious" (as per aide at bedside) about having the mckeon in place as well as how to drain it  -Will resume Flomax and Pyridium  -Will empirically initiate patient on Rocephin, for now  -Must ask  to see patient and determine if his current adult home is unable to adequately offer staffing that can assist with patient emptying mckeon, patient may potentially need placement in a SNF  -May potentially also need to involve Psych, though unclear if would be beneficial at this time    HTN:  -Will resume patient's home medications  -Vital Signs Q4H    HLD:  -Lipid Panel with AM labs  -Will resume patient's home medication    Impulse and Mood Disorder:  -Will resume patient's home medications    GI/DVT PPx:  -Lovenox  -Pepcid Patient is a 31 year old male with a PMH of HTN, HLD, GERD, Development Delay, Autism, Intellectual Disability, Impulse and Mood disorder, Factitious disorder, Prior Psych Admissions who was BIBEMS due to urinary retention.  Patient states that s/p his recent discharge from [facility] on 8/4/2021, he has been having difficulty with managing his indwelling mckeon catheter, along with the staff at his group home being unable to assist with emptying the bag results in patient becoming severely agitated.  As a result of patient's agitation, patient returned to [facility] ED for further management.    Of note, patient was recently admitted to [facility] on 8/3/2021 for Urinary retention and has been either seen in ED and/or admitted previously for urinary retention.    At time of examination in the ED, patient endorses dysuria as well as suprapubic pain.  However, patient denies any headache, dizziness, chest pain, palpitations, shortness of breath, nausea/vomiting/diarrhea.    T(C): 36.3 (08-06-21 @ 23:16), Max: 36.3 (08-06-21 @ 23:16)  T(F): 97.3 (08-06-21 @ 23:16), Max: 97.3 (08-06-21 @ 23:16)  HR: 72 (08-06-21 @ 23:16) (72 - 72)  BP: 121/85 (08-06-21 @ 23:16) (121/85 - 121/85)  RR: 16 (08-06-21 @ 23:16) (16 - 16)  SpO2: 98% (08-06-21 @ 23:16) (98% - 98%)  Wt(kg): --    P/E: As above MAR    A/P:    Urinary Retention:  -Currently awaiting UA in ED  -Patient endorses dysuria as well as suprapubic pain  -Patient also endorses inability to pass urine unless mckeon placed, though paradoxically, historically upon returning to his adult home, he becomes "anxious" (as per aide at bedside) about having the mckeon in place as well as how to drain it  -Will resume Flomax and Pyridium  -Will empirically initiate patient on Rocephin, for now  -Must ask  to see patient and determine if his current adult home is unable to adequately offer staffing that can assist with patient emptying mckeon, patient may potentially need placement in a SNF  -May potentially also need to involve Psych, though unclear if would be beneficial at this time    HTN:  -Will resume patient's home medications  -Vital Signs Q4H    HLD:  -Lipid Panel with AM labs  -Will resume patient's home medication    GERD:  -Will resume patient's home medication    Impulse and Mood Disorder:  -Will resume patient's home medications    GI/DVT PPx:  -Lovenox  -PPI Patient is a 31 year old male with a PMH of HTN, HLD, Hypothyroidism, GERD, Development Delay, Autism, Intellectual Disability, Impulse and Mood disorder, Factitious disorder, Prior Psych Admissions who was BIBEMS due to urinary retention.  Patient states that s/p his recent discharge from [facility] on 8/4/2021, he has been having difficulty with managing his indwelling mckeon catheter, along with the staff at his group home being unable to assist with emptying the bag results in patient becoming severely agitated.  As a result of patient's agitation, patient returned to [facility] ED for further management.    Of note, patient was recently admitted to [facility] on 8/3/2021 for Urinary retention and has been either seen in ED and/or admitted previously for urinary retention.    At time of examination in the ED, patient endorses dysuria as well as suprapubic pain.  However, patient denies any headache, dizziness, chest pain, palpitations, shortness of breath, nausea/vomiting/diarrhea.    T(C): 36.3 (08-06-21 @ 23:16), Max: 36.3 (08-06-21 @ 23:16)  T(F): 97.3 (08-06-21 @ 23:16), Max: 97.3 (08-06-21 @ 23:16)  HR: 72 (08-06-21 @ 23:16) (72 - 72)  BP: 121/85 (08-06-21 @ 23:16) (121/85 - 121/85)  RR: 16 (08-06-21 @ 23:16) (16 - 16)  SpO2: 98% (08-06-21 @ 23:16) (98% - 98%)  Wt(kg): --    P/E: As above MAR    A/P:    Urinary Retention:  -Currently awaiting UA in ED  -Patient endorses dysuria as well as suprapubic pain  -Patient also endorses inability to pass urine unless mckeon placed, though paradoxically, historically upon returning to his adult home, he becomes "anxious" (as per aide at bedside) about having the mckeon in place as well as how to drain it  -Will resume Flomax and Pyridium  -Will empirically initiate patient on Rocephin, for now  -Must ask  to see patient and determine if his current adult home is unable to adequately offer staffing that can assist with patient emptying mckeon, patient may potentially need placement in a SNF  -May potentially also need to involve Psych, though unclear if would be beneficial at this time    HTN:  -Will resume patient's home medications  -Vital Signs Q4H    HLD:  -Lipid Panel with AM labs  -Will resume patient's home medication    Hypothyroidism:  -Will resume patient's home medication    GERD:  -Will resume patient's home medication    Impulse and Mood Disorder:  -Will resume patient's home medications    GI/DVT PPx:  -Lovenox  -PPI

## 2021-08-07 NOTE — H&P ADULT - NSHPPHYSICALEXAM_GEN_ALL_CORE
Vital Signs Last 24 Hrs  T(C): 36.7 (07 Aug 2021 05:33), Max: 36.7 (07 Aug 2021 05:33)  T(F): 98 (07 Aug 2021 05:33), Max: 98 (07 Aug 2021 05:33)  HR: 66 (07 Aug 2021 05:33) (66 - 72)  BP: 114/74 (07 Aug 2021 05:33) (114/74 - 121/85)  RR: 16 (07 Aug 2021 05:33) (16 - 16)  SpO2: 97% (07 Aug 2021 05:33) (97% - 98%)    GENERAL: NAD, lying in bed comfortably  HEAD:  Atraumatic, Normocephalic  EYES: EOMI, PERRLA, conjunctiva and sclera clear  ENT: Moist mucous membranes  NECK: Supple, No JVD  CHEST/LUNG: Clear to auscultation bilaterally; No rales, rhonchi, wheezing, or rubs. Unlabored respirations  HEART: Regular rate and rhythm; No murmurs, rubs, or gallops  ABDOMEN: Tenderness over abdomen ?factitious  EXTREMITIES:  2+ Peripheral Pulses, brisk capillary refill. No clubbing, cyanosis, or edema  NERVOUS SYSTEM:  Alert & Oriented X3, speech clear. No deficits   MSK: FROM all 4 extremities, full and equal strength  SKIN: No rashes or lesions

## 2021-08-07 NOTE — DISCHARGE NOTE PROVIDER - CARE PROVIDER_API CALL
Gus Birmingham (MD)  Family Medicine  [address] [place] [address]  Phone: [phone]  Fax: [phone]  Follow Up Time:    Gus Birmingham)  Family Medicine  [address] [place] [address]  Phone: [phone]  Fax: [phone]  Follow Up Time:     Len Lutz)  Urology  [address] [place] [address]  Phone: [phone]  Fax: [phone]  Follow Up Time:

## 2021-08-07 NOTE — H&P ADULT - PROBLEM SELECTOR PLAN 6
KEKE consulted because his current [facility] group home is unable to adequately offer staffing that can assist with patient emptying mckeon, patient may potentially need placement in a Rehab

## 2021-08-08 LAB
ALBUMIN SERPL ELPH-MCNC: 3.1 G/DL — LOW (ref 3.5–5)
ALP SERPL-CCNC: 96 U/L — SIGNIFICANT CHANGE UP (ref 40–120)
ALT FLD-CCNC: 34 U/L DA — SIGNIFICANT CHANGE UP (ref 10–60)
ANION GAP SERPL CALC-SCNC: 7 MMOL/L — SIGNIFICANT CHANGE UP (ref 5–17)
AST SERPL-CCNC: 12 U/L — SIGNIFICANT CHANGE UP (ref 10–40)
BILIRUB SERPL-MCNC: 0.3 MG/DL — SIGNIFICANT CHANGE UP (ref 0.2–1.2)
BUN SERPL-MCNC: 11 MG/DL — SIGNIFICANT CHANGE UP (ref 7–18)
CALCIUM SERPL-MCNC: 8.4 MG/DL — SIGNIFICANT CHANGE UP (ref 8.4–10.5)
CHLORIDE SERPL-SCNC: 109 MMOL/L — HIGH (ref 96–108)
CO2 SERPL-SCNC: 25 MMOL/L — SIGNIFICANT CHANGE UP (ref 22–31)
COVID-19 SPIKE DOMAIN AB INTERP: POSITIVE
COVID-19 SPIKE DOMAIN ANTIBODY RESULT: 178 U/ML — HIGH
CREAT SERPL-MCNC: 0.77 MG/DL — SIGNIFICANT CHANGE UP (ref 0.5–1.3)
CULTURE RESULTS: NO GROWTH — SIGNIFICANT CHANGE UP
GLUCOSE SERPL-MCNC: 115 MG/DL — HIGH (ref 70–99)
HCT VFR BLD CALC: 42.1 % — SIGNIFICANT CHANGE UP (ref 39–50)
HGB BLD-MCNC: 13.1 G/DL — SIGNIFICANT CHANGE UP (ref 13–17)
MAGNESIUM SERPL-MCNC: 2.2 MG/DL — SIGNIFICANT CHANGE UP (ref 1.6–2.6)
MCHC RBC-ENTMCNC: 24 PG — LOW (ref 27–34)
MCHC RBC-ENTMCNC: 31.1 GM/DL — LOW (ref 32–36)
MCV RBC AUTO: 77.1 FL — LOW (ref 80–100)
NRBC # BLD: 0 /100 WBCS — SIGNIFICANT CHANGE UP (ref 0–0)
PHOSPHATE SERPL-MCNC: 2.7 MG/DL — SIGNIFICANT CHANGE UP (ref 2.5–4.5)
PLATELET # BLD AUTO: 257 K/UL — SIGNIFICANT CHANGE UP (ref 150–400)
POTASSIUM SERPL-MCNC: 4 MMOL/L — SIGNIFICANT CHANGE UP (ref 3.5–5.3)
POTASSIUM SERPL-SCNC: 4 MMOL/L — SIGNIFICANT CHANGE UP (ref 3.5–5.3)
PROT SERPL-MCNC: 6.1 G/DL — SIGNIFICANT CHANGE UP (ref 6–8.3)
RBC # BLD: 5.46 M/UL — SIGNIFICANT CHANGE UP (ref 4.2–5.8)
RBC # FLD: 15.1 % — HIGH (ref 10.3–14.5)
SARS-COV-2 IGG+IGM SERPL QL IA: 178 U/ML — HIGH
SARS-COV-2 IGG+IGM SERPL QL IA: POSITIVE
SODIUM SERPL-SCNC: 141 MMOL/L — SIGNIFICANT CHANGE UP (ref 135–145)
SPECIMEN SOURCE: SIGNIFICANT CHANGE UP
WBC # BLD: 8.08 K/UL — SIGNIFICANT CHANGE UP (ref 3.8–10.5)
WBC # FLD AUTO: 8.08 K/UL — SIGNIFICANT CHANGE UP (ref 3.8–10.5)

## 2021-08-08 PROCEDURE — 99233 SBSQ HOSP IP/OBS HIGH 50: CPT | Mod: GC

## 2021-08-08 PROCEDURE — 99222 1ST HOSP IP/OBS MODERATE 55: CPT | Mod: GT

## 2021-08-08 RX ADMIN — QUETIAPINE FUMARATE 200 MILLIGRAM(S): 200 TABLET, FILM COATED ORAL at 13:05

## 2021-08-08 RX ADMIN — Medication 1000 MILLIGRAM(S): at 06:32

## 2021-08-08 RX ADMIN — Medication 200 MILLIGRAM(S): at 06:32

## 2021-08-08 RX ADMIN — Medication 200 MILLIGRAM(S): at 13:04

## 2021-08-08 RX ADMIN — PANTOPRAZOLE SODIUM 40 MILLIGRAM(S): 20 TABLET, DELAYED RELEASE ORAL at 06:32

## 2021-08-08 RX ADMIN — ENOXAPARIN SODIUM 40 MILLIGRAM(S): 100 INJECTION SUBCUTANEOUS at 11:17

## 2021-08-08 RX ADMIN — Medication 1000 MILLIGRAM(S): at 17:06

## 2021-08-08 RX ADMIN — ATORVASTATIN CALCIUM 10 MILLIGRAM(S): 80 TABLET, FILM COATED ORAL at 22:08

## 2021-08-08 RX ADMIN — QUETIAPINE FUMARATE 50 MILLIGRAM(S): 200 TABLET, FILM COATED ORAL at 22:12

## 2021-08-08 RX ADMIN — QUETIAPINE FUMARATE 50 MILLIGRAM(S): 200 TABLET, FILM COATED ORAL at 09:08

## 2021-08-08 RX ADMIN — FINASTERIDE 5 MILLIGRAM(S): 5 TABLET, FILM COATED ORAL at 11:17

## 2021-08-08 RX ADMIN — Medication 200 MILLIGRAM(S): at 22:08

## 2021-08-08 RX ADMIN — Medication 50 MICROGRAM(S): at 06:32

## 2021-08-08 RX ADMIN — LIDOCAINE 1 APPLICATION(S): 4 CREAM TOPICAL at 22:08

## 2021-08-08 RX ADMIN — TAMSULOSIN HYDROCHLORIDE 0.4 MILLIGRAM(S): 0.4 CAPSULE ORAL at 22:08

## 2021-08-08 NOTE — PROGRESS NOTE ADULT - SUBJECTIVE AND OBJECTIVE BOX
Patient is a 31y old  Male who presents with a chief complaint of Urinary retention (07 Aug 2021 14:42)      SUBJECTIVE / OVERNIGHT EVENTS: ANJANA overnight, says that he has no flank or suprapubic pain today. Wants to go home.  ADDITIONAL REVIEW OF SYSTEMS: No fever, chills, nausea, vomiting    MEDICATIONS  (STANDING):  atorvastatin 10 milliGRAM(s) Oral at bedtime  cloNIDine 0.2 milliGRAM(s) Oral at bedtime  enoxaparin Injectable 40 milliGRAM(s) SubCutaneous daily  finasteride 5 milliGRAM(s) Oral daily  levothyroxine 50 MICROGram(s) Oral daily  lidocaine 2% Gel 1 Application(s) Topical three times a day  pantoprazole    Tablet 40 milliGRAM(s) Oral before breakfast  phenazopyridine 200 milliGRAM(s) Oral every 8 hours  QUEtiapine 200 milliGRAM(s) Oral daily  sodium chloride 0.9%. 1000 milliLiter(s) (70 mL/Hr) IV Continuous <Continuous>  tamsulosin 0.4 milliGRAM(s) Oral at bedtime  valproic  acid Syrup 1000 milliGRAM(s) Oral two times a day    MEDICATIONS  (PRN):  acetaminophen   Tablet .. 975 milliGRAM(s) Oral every 8 hours PRN Mild Pain (1 - 3), Moderate Pain (4 - 6)  chlorproMAZINE    Injectable 50 milliGRAM(s) IntraMuscular every 6 hours PRN severe agitation not responsive to PO thorazine  chlorproMAZINE    Tablet 50 milliGRAM(s) Oral every 6 hours PRN Severe agitation not responsive to seroquel  QUEtiapine 50 milliGRAM(s) Oral every 6 hours PRN severe agitaion      CAPILLARY BLOOD GLUCOSE        I&O's Summary    07 Aug 2021 07:01  -  08 Aug 2021 07:00  --------------------------------------------------------  IN: 0 mL / OUT: 3200 mL / NET: -3200 mL    08 Aug 2021 07:01  -  08 Aug 2021 16:01  --------------------------------------------------------  IN: 0 mL / OUT: 50 mL / NET: -50 mL        PHYSICAL EXAM:  Vital Signs Last 24 Hrs  T(C): 36.6 (08 Aug 2021 14:38), Max: 36.6 (07 Aug 2021 20:21)  T(F): 97.9 (08 Aug 2021 14:38), Max: 97.9 (07 Aug 2021 20:21)  HR: 90 (08 Aug 2021 14:38) (59 - 90)  BP: 126/76 (08 Aug 2021 14:38) (94/50 - 126/76)  BP(mean): --  RR: 18 (08 Aug 2021 14:38) (18 - 18)  SpO2: 98% (08 Aug 2021 14:38) (95% - 98%)     GENERAL: NAD,  HEAD:  Atraumatic, Normocephalic  CHEST/LUNG: Clear to auscultation bilaterally; No wheeze  HEART: Regular rate and rhythm; No murmurs, rubs, or gallops  ABDOMEN: Soft, Nontender, Nondistended; Bowel sounds present  EXTREMITIES:  2+ Peripheral Pulses, No clubbing, cyanosis, or edema  PSYCH: AAOx3  NEUROLOGY: non-focal  SKIN: No rashes or lesions       LABS:                        13.1   8.08  )-----------( 257      ( 08 Aug 2021 07:13 )             42.1     08-08    141  |  109<H>  |  11  ----------------------------<  115<H>  4.0   |  25  |  0.77    Ca    8.4      08 Aug 2021 07:13  Phos  2.7     08-08  Mg     2.2     08-08    TPro  6.1  /  Alb  3.1<L>  /  TBili  0.3  /  DBili  x   /  AST  12  /  ALT  34  /  AlkPhos  96  08-08          Urinalysis Basic - ( 07 Aug 2021 04:16 )    Color: Yellow / Appearance: Clear / S.010 / pH: x  Gluc: x / Ketone: Negative  / Bili: Negative / Urobili: Negative   Blood: x / Protein: Negative / Nitrite: Negative   Leuk Esterase: Negative / RBC: x / WBC x   Sq Epi: x / Non Sq Epi: x / Bacteria: x        Culture - Urine (collected 07 Aug 2021 06:43)  Source: Clean Catch Clean Catch (Midstream)  Final Report (08 Aug 2021 06:02):    No growth      Trend Procalcitonin          D-Dimer:      RADIOLOGY & ADDITIONAL TESTS:  Results Reviewed:   Imaging Personally Reviewed:  Electrocardiogram Personally Reviewed:    COORDINATION OF CARE:  Care Discussed with Consultants/Other Providers [Y/N]:  Prior or Outpatient Records Reviewed [Y/N]:

## 2021-08-08 NOTE — BH CONSULTATION LIAISON ASSESSMENT NOTE - HPI (INCLUDE ILLNESS QUALITY, SEVERITY, DURATION, TIMING, CONTEXT, MODIFYING FACTORS, ASSOCIATED SIGNS AND SYMPTOMS)
32yo M, domiciled at [facility], PPH of intellectual disability, mood disorder, factitious disorder, past psych admissions, no known SA, hx of self injury (nonsuicidal), hx of aggression, no legal issues, BIBEMS for urinary retention and admitted to urology service, psych consulted for history of self injury and aggression at the group home. Preliminary med recs made yesterday by Dr. Booker, full consult today.     Per chart, received 1 dose of Seroquel 50mg PO PRN this morning.     Per RN patient has been demanding but is able to be redirected. There have been no episodes of verbal or physical aggression. Patient has not made any suicidal or homicidal statements. Taking medications and eating meals.     On evaluation by telecart patient is calm, cooperative, sitting up in bed, AOx3. He states that he was having issues with catheter however denies any changes in mood. He denies any thoughts of self harm ot SI. He denies feeling depressed, AH, VH, paranoia, iOR. Insists that he is ready to go home "I miss my room mate and the staff there." Presented future oriented and expressive of needs.

## 2021-08-08 NOTE — BH CONSULTATION LIAISON ASSESSMENT NOTE - SUMMARY
30yo M, domiciled at [facility], PPH of intellectual disability, mood disorder, factitious disorder, past psych admissions, no known SA, hx of self injury (nonsuicidal), hx of aggression, no legal issues, BIBEMS for urinary retention and admitted to urology service. Psych consulted given history of self injurious behavior and aggression. Seen previously and recently by psych [facility] on 8/3 and 8/4/21.    On this exam, patient presented calm, euthymic, linear, expressive of needs, future oriented, without SI/HI. There were no acute mood or psychotic symptoms or any indications of psychiatric decompensation. Appeared to be at baseline and expressed desire to return to group home as soon as possible. At this time there is no indication for involuntary psychiatric hospitalization.    Can continue with previous recs:  -Continue 1:1, per signout by Dr. Booker has 1:1 at group [employer] as well  -Continue home medications: Depakene 1000mg PO BID and Seroquel 200mg PO daily (hold for qtc >500)  -pending VPA level, repeat EKG    Can continue the following PRN medications for agitation:   mild agitation: seroquel 50mg po q6hr prn mild agitation -hold if qtC>500  severe agitation: thorazine 50mg IM q6hr prn severe agitation - if not responsive to po seroquel or unable to take po meds; hold if qtC>500

## 2021-08-08 NOTE — ED BEHAVIORAL HEALTH NOTE - BEHAVIORAL HEALTH NOTE
========================  FOR EACH COLLATERAL  ========================  NAME: Mariposa.   NUMBER:  [phone].   RELATIONSHIP: Weekend staff.   COMMENTS:  Collateral reported that she last saw patient this past Sunday prior to pt getting admitted medically. Pt at baseline will make self-harm related statements as well as verbally threaten staff. Pt at baseline will also activate 911 multiple times a day in order to go to the hospital as an incentive. At times pt will go to the extent of banging his head or hand on the wall in order to get taken to the hospital for medical related reasons. This staff member only works on Sundays. However, she is aware that pt has been complaint with medication. Collateral confirmed that pt has a 1:1 24/7 for safety precautions. Pt has also made several allegations in the past toward his 1:1s. Therefore, pt always has a different staff. Further details regarding the allegation were not disclosed. Collateral reported that pt has also threaten to call management on this staff member saying “You’re not doing your job” etc. Lastly, collateral alleged that pt has been verbally harassing and calling his mother to constantly ask for money. Collateral reported that they would fax over the pt’s medication list. However, [facility] has not received the list yet. Unclear if the medication list has been adjusted, collateral was unable to provide this information.   ========================  COVID Exposure Screen- Patient  ========================  1.	*Have you had a COVID-19 test in the last 90 days?  (  ) Yes   (  ) No   ( X ) Unknown- Reason: _____  IF YES PROCEED TO QUESTION #2. IF NO OR UNKNOWN, PLEASE SKIP TO QUESTION #3.  2.	Date of test(s) and result(s): ________  3.	*Have you tested positive for COVID-19 antibodies? (  ) Yes   (  ) No   (X  ) Unknown- Reason: _____  IF YES PROCEED TO QUESTION #4. IF NO or UNKNOWN, PLEASE SKIP TO QUESTION #5.  4.	Date of positive antibody test: ________  5.	*Have you received 2 doses of the COVID-19 vaccine? (  ) Yes   (  ) No   (X  ) Unknown- Reason: _____  IF YES PROCEED TO QUESTION #6. IF NO or UNKNOWN, PLEASE SKIP TO QUESTION #7.  6.	Date of second dose: ________  7.	*In the past 10 days, have you been around anyone with a positive COVID-19 test?* (  ) Yes   (  ) No   (X  ) Unknown- Reason: ____  IF YES PROCEED TO QUESTION #8. IF NO or UNKNOWN, PLEASE SKIP TO QUESTION #13.  8.	Were you within 6 feet of them for at least 15 minutes? (  ) Yes   (  ) No   (  ) Unknown- Reason: _____  9.	Have you provided care for them? (  ) Yes   (  ) No   (  ) Unknown- Reason: ______  10.	Have you had direct physical contact with them (touched, hugged, or kissed them)? (  ) Yes   (  ) No    (  ) Unknown- Reason: _____  11.	Have you shared eating or drinking utensils with them? (  ) Yes   (  ) No    (  ) Unknown- Reason: ____  12.	Have they sneezed, coughed, or somehow gotten respiratory droplets on you? (  ) Yes   (  ) No    (  ) Unknown- Reason: ______  13.	*Have you been out of [place] within the past 10 days?* (  ) Yes   (  ) No   (X  ) Unknown- Reason: _____  IF YES PLEASE ANSWER THE FOLLOWING QUESTIONS:  14.	Which state/country have you been to? ______  15.	Were you there over 24 hours? (  ) Yes   (  ) No    (  ) Unknown- Reason: ______  16.	Date of return to [facility]: ______

## 2021-08-08 NOTE — BH CONSULTATION LIAISON ASSESSMENT NOTE - NSBHCHARTREVIEWVS_PSY_A_CORE FT
Vital Signs Last 24 Hrs  T(C): 36.4 (08 Aug 2021 09:20), Max: 36.6 (07 Aug 2021 20:21)  T(F): 97.5 (08 Aug 2021 09:20), Max: 97.9 (07 Aug 2021 20:21)  HR: 84 (08 Aug 2021 09:20) (59 - 84)  BP: 114/69 (08 Aug 2021 09:20) (94/50 - 114/69)  BP(mean): --  RR: 18 (08 Aug 2021 09:20) (18 - 18)  SpO2: 95% (08 Aug 2021 09:20) (95% - 97%)

## 2021-08-08 NOTE — BH CONSULTATION LIAISON ASSESSMENT NOTE - NSBHMSEKNOWHOW_PSY_ALL_CORE
CERTIFICATE OF WORK      February 23, 2017       Re: Brandyn Paniagua  [address]  [facility] [address]      This is to certify that Brandyn Paniagua has been under my care from 3/15/17 and can return to light work on 3/27/17.    RESTRICTIONS: no lifting more than 20lbs for 1 week.  Return full duty 4/3/17    REMARKS:        SIGNATURE:___________________________________________, 2/23/2017      Dr. Randy Gavin  [address] [place] [id number]  
Current Events

## 2021-08-08 NOTE — BH CONSULTATION LIAISON ASSESSMENT NOTE - CURRENT MEDICATION
MEDICATIONS  (STANDING):  atorvastatin 10 milliGRAM(s) Oral at bedtime  cefTRIAXone   IVPB 2000 milliGRAM(s) IV Intermittent every 24 hours  cloNIDine 0.2 milliGRAM(s) Oral at bedtime  enoxaparin Injectable 40 milliGRAM(s) SubCutaneous daily  finasteride 5 milliGRAM(s) Oral daily  levothyroxine 50 MICROGram(s) Oral daily  lidocaine 2% Gel 1 Application(s) Topical three times a day  pantoprazole    Tablet 40 milliGRAM(s) Oral before breakfast  phenazopyridine 200 milliGRAM(s) Oral every 8 hours  QUEtiapine 200 milliGRAM(s) Oral daily  sodium chloride 0.9%. 1000 milliLiter(s) (70 mL/Hr) IV Continuous <Continuous>  tamsulosin 0.4 milliGRAM(s) Oral at bedtime  valproic  acid Syrup 1000 milliGRAM(s) Oral two times a day    MEDICATIONS  (PRN):  acetaminophen   Tablet .. 975 milliGRAM(s) Oral every 8 hours PRN Mild Pain (1 - 3), Moderate Pain (4 - 6)  chlorproMAZINE    Injectable 50 milliGRAM(s) IntraMuscular every 6 hours PRN severe agitation not responsive to PO thorazine  chlorproMAZINE    Tablet 50 milliGRAM(s) Oral every 6 hours PRN Severe agitation not responsive to seroquel  QUEtiapine 50 milliGRAM(s) Oral every 6 hours PRN severe agitaion

## 2021-08-09 PROCEDURE — 99232 SBSQ HOSP IP/OBS MODERATE 35: CPT | Mod: GC

## 2021-08-09 PROCEDURE — 76775 US EXAM ABDO BACK WALL LIM: CPT | Mod: 26

## 2021-08-09 RX ORDER — LIDOCAINE 4 G/100G
1 CREAM TOPICAL ONCE
Refills: 0 | Status: COMPLETED | OUTPATIENT
Start: 2021-08-09 | End: 2021-08-10

## 2021-08-09 RX ORDER — QUETIAPINE FUMARATE 200 MG/1
1 TABLET, FILM COATED ORAL
Qty: 0 | Refills: 0 | DISCHARGE

## 2021-08-09 RX ADMIN — LIDOCAINE 1 APPLICATION(S): 4 CREAM TOPICAL at 06:27

## 2021-08-09 RX ADMIN — Medication 50 MICROGRAM(S): at 06:26

## 2021-08-09 RX ADMIN — QUETIAPINE FUMARATE 50 MILLIGRAM(S): 200 TABLET, FILM COATED ORAL at 22:28

## 2021-08-09 RX ADMIN — ENOXAPARIN SODIUM 40 MILLIGRAM(S): 100 INJECTION SUBCUTANEOUS at 12:15

## 2021-08-09 RX ADMIN — PANTOPRAZOLE SODIUM 40 MILLIGRAM(S): 20 TABLET, DELAYED RELEASE ORAL at 06:26

## 2021-08-09 RX ADMIN — Medication 200 MILLIGRAM(S): at 06:26

## 2021-08-09 RX ADMIN — Medication 200 MILLIGRAM(S): at 22:28

## 2021-08-09 RX ADMIN — ATORVASTATIN CALCIUM 10 MILLIGRAM(S): 80 TABLET, FILM COATED ORAL at 22:28

## 2021-08-09 RX ADMIN — Medication 200 MILLIGRAM(S): at 13:59

## 2021-08-09 RX ADMIN — FINASTERIDE 5 MILLIGRAM(S): 5 TABLET, FILM COATED ORAL at 12:15

## 2021-08-09 RX ADMIN — TAMSULOSIN HYDROCHLORIDE 0.4 MILLIGRAM(S): 0.4 CAPSULE ORAL at 22:29

## 2021-08-09 RX ADMIN — Medication 1000 MILLIGRAM(S): at 06:26

## 2021-08-09 RX ADMIN — Medication 1000 MILLIGRAM(S): at 17:22

## 2021-08-09 RX ADMIN — QUETIAPINE FUMARATE 200 MILLIGRAM(S): 200 TABLET, FILM COATED ORAL at 12:15

## 2021-08-09 NOTE — PROGRESS NOTE ADULT - NUTRITIONAL ASSESSMENT
Diet, Regular:   DASH/TLC {Sodium & Cholesterol Restricted}  Lacto Veg (Accepts Milk & Milk Products) (08-07-21 @ 06:50) [Active] Diet, Regular:   DASH/TLC {Sodium & Cholesterol Restricted}  Lacto Veg (Accepts Milk & Milk Products) (08-07-21 @ 06:50) [Active]          Patient also endorses inability to pass urine unless mckeon placed, though paradoxically, historically upon returning to his adult home, he becomes "anxious" (as per aide at bedside) about having the mckeon in place as well as how to drain it  - UA and urine cx negative, empiric abx discontinued  - continue Flomax and Pyridium  -Must ask  to see patient and determine if his current adult home is unable to adequately offer staffing that can assist with patient emptying mckeon, patient may potentially need placement in a SNF  - appreciate psych consult  - continue home meds

## 2021-08-09 NOTE — CONSULT NOTE ADULT - ASSESSMENT
31 yoM with urinary retention    afeb, vss, Cr wnl  mckeon placed in ED, concentrated urine  retention likely behavioral, chronic dehydration from low fluid intake yet daily beers  rec noncont CT to r/o kidney/ureteral stone; if negative, TOV per primary  d/w Dr. Sharpe

## 2021-08-09 NOTE — PROGRESS NOTE ADULT - SUBJECTIVE AND OBJECTIVE BOX
PGY-1 Progress Note discussed with attending    PLEASE CONTACT ON CALL TEAM:  - On Call Team (Please refer to Leah) FROM 5:00 PM - 8:30PM  - Nightfloat Team FROM 8:30 -7:30 AM    CHIEF COMPLAINT & BRIEF HOSPITAL COURSE:  Pt is a 32 yo M from [facility] with PMH of Autism Spectrum disorder, Intellectual disability, ADHD, mood disorder and Asthma came to the ED with chief complaints of abdominal pain and urinary retention. Based on a [facility] volunteer, yesterday morning pt was complaining of some abdominal pain and he hadn't urinated since the previous night. When asked pt, he said he has diffuse abdominal pain and back pain. No h/o CP, SOB, headaches, dizziness, N/V. Pt was admitted to [facility] on Aug 3rd,2021 for urinary retention and discharged with a mckeon catheter, but at the [facility] home they are not able to care for the catheter care and pt gets very agitated about it.  (07 Aug 2021 02:47)      INTERVAL HPI/OVERNIGHT EVENTS:         MEDICATIONS  (STANDING):  atorvastatin 10 milliGRAM(s) Oral at bedtime  cloNIDine 0.2 milliGRAM(s) Oral at bedtime  enoxaparin Injectable 40 milliGRAM(s) SubCutaneous daily  finasteride 5 milliGRAM(s) Oral daily  levothyroxine 50 MICROGram(s) Oral daily  pantoprazole    Tablet 40 milliGRAM(s) Oral before breakfast  phenazopyridine 200 milliGRAM(s) Oral every 8 hours  QUEtiapine 200 milliGRAM(s) Oral daily  sodium chloride 0.9%. 1000 milliLiter(s) (70 mL/Hr) IV Continuous <Continuous>  tamsulosin 0.4 milliGRAM(s) Oral at bedtime  valproic  acid Syrup 1000 milliGRAM(s) Oral two times a day    MEDICATIONS  (PRN):  acetaminophen   Tablet .. 975 milliGRAM(s) Oral every 8 hours PRN Mild Pain (1 - 3), Moderate Pain (4 - 6)  chlorproMAZINE    Injectable 50 milliGRAM(s) IntraMuscular every 6 hours PRN severe agitation not responsive to PO thorazine  chlorproMAZINE    Tablet 50 milliGRAM(s) Oral every 6 hours PRN Severe agitation not responsive to seroquel  QUEtiapine 50 milliGRAM(s) Oral every 6 hours PRN severe agitaion        REVIEW OF SYSTEMS:  CONSTITUTIONAL: No fever, weight loss, or fatigue  RESPIRATORY: No cough, wheezing, chills or hemoptysis; No shortness of breath  CARDIOVASCULAR: No chest pain, palpitations, dizziness, or leg swelling  GASTROINTESTINAL: No abdominal pain. No nausea, vomiting, or hematemesis; No diarrhea or constipation. No melena or hematochezia.  GENITOURINARY: No dysuria or hematuria, urinary frequency  NEUROLOGICAL: No headaches, memory loss, loss of strength, numbness, or tremors  SKIN: No itching, burning, rashes, or lesions     Vital Signs Last 24 Hrs  T(C): 36.6 (09 Aug 2021 05:09), Max: 36.6 (08 Aug 2021 14:00)  T(F): 97.8 (09 Aug 2021 05:09), Max: 97.9 (08 Aug 2021 14:00)  HR: 58 (09 Aug 2021 05:09) (58 - 90)  BP: 100/59 (09 Aug 2021 05:09) (100/59 - 126/76)  BP(mean): --  RR: 16 (09 Aug 2021 05:09) (16 - 18)  SpO2: 95% (09 Aug 2021 05:09) (95% - 98%)    PHYSICAL EXAMINATION:  GENERAL: NAD, well built  HEAD:  Atraumatic, Normocephalic  EYES:  conjunctiva and sclera clear  NECK: Supple, No JVD, Normal thyroid  CHEST/LUNG: Clear to auscultation. Clear to percussion bilaterally; No rales, rhonchi, wheezing, or rubs  HEART: Regular rate and rhythm; No murmurs, rubs, or gallops  ABDOMEN: Soft, Nontender, Nondistended; Bowel sounds present  NERVOUS SYSTEM:  Alert & Oriented X3,    EXTREMITIES:  2+ Peripheral Pulses, No clubbing, cyanosis, or edema  SKIN: warm dry                          13.1   8.08  )-----------( 257      ( 08 Aug 2021 07:13 )             42.1     08-08    141  |  109<H>  |  11  ----------------------------<  115<H>  4.0   |  25  |  0.77    Ca    8.4      08 Aug 2021 07:13  Phos  2.7     08-08  Mg     2.2     08-08    TPro  6.1  /  Alb  3.1<L>  /  TBili  0.3  /  DBili  x   /  AST  12  /  ALT  34  /  AlkPhos  96  08-08    LIVER FUNCTIONS - ( 08 Aug 2021 07:13 )  Alb: 3.1 g/dL / Pro: 6.1 g/dL / ALK PHOS: 96 U/L / ALT: 34 U/L DA / AST: 12 U/L / GGT: x               RADIOLOGY & ADDITIONAL TESTS:                   PGY-1 Progress Note discussed with attending    PLEASE CONTACT ON CALL TEAM:  - On Call Team (Please refer to Leah) FROM 5:00 PM - 8:30PM  - Nightfloat Team FROM 8:30 -7:30 AM    CHIEF COMPLAINT & BRIEF HOSPITAL COURSE:  Pt is a 32 yo M from [facility] with PMH of Autism Spectrum disorder, Intellectual disability, ADHD, mood disorder and Asthma came to the ED with chief complaints of abdominal pain and urinary retention. Based on a [facility] volunteer, yesterday morning pt was complaining of some abdominal pain and he hadn't urinated since the previous night. When asked pt, he said he has diffuse abdominal pain and back pain. No h/o CP, SOB, headaches, dizziness, N/V. Pt was admitted to [facility] on Aug 3rd,2021 for urinary retention and discharged with a mckeon catheter, but at the group home they are not able to care for the catheter care and pt gets very agitated about it.  (07 Aug 2021 02:47)      INTERVAL HPI/OVERNIGHT EVENTS:   No overnight events reported. Patient appeared anxious, requesting to go home and states that Mckeon catheter is not needed anymore and perseverating on Mckeon catheter removal.       MEDICATIONS  (STANDING):  atorvastatin 10 milliGRAM(s) Oral at bedtime  cloNIDine 0.2 milliGRAM(s) Oral at bedtime  enoxaparin Injectable 40 milliGRAM(s) SubCutaneous daily  finasteride 5 milliGRAM(s) Oral daily  levothyroxine 50 MICROGram(s) Oral daily  pantoprazole    Tablet 40 milliGRAM(s) Oral before breakfast  phenazopyridine 200 milliGRAM(s) Oral every 8 hours  QUEtiapine 200 milliGRAM(s) Oral daily  sodium chloride 0.9%. 1000 milliLiter(s) (70 mL/Hr) IV Continuous <Continuous>  tamsulosin 0.4 milliGRAM(s) Oral at bedtime  valproic  acid Syrup 1000 milliGRAM(s) Oral two times a day    MEDICATIONS  (PRN):  acetaminophen   Tablet .. 975 milliGRAM(s) Oral every 8 hours PRN Mild Pain (1 - 3), Moderate Pain (4 - 6)  chlorproMAZINE    Injectable 50 milliGRAM(s) IntraMuscular every 6 hours PRN severe agitation not responsive to PO thorazine  chlorproMAZINE    Tablet 50 milliGRAM(s) Oral every 6 hours PRN Severe agitation not responsive to seroquel  QUEtiapine 50 milliGRAM(s) Oral every 6 hours PRN severe agitaion      REVIEW OF SYSTEMS:   CONSTITUTIONAL: No fever, weight loss, or fatigue  RESPIRATORY: No cough, wheezing, chills or hemoptysis; No shortness of breath  CARDIOVASCULAR: No chest pain, palpitations, dizziness, or leg swelling  GASTROINTESTINAL: No abdominal pain. No nausea, vomiting, or hematemesis; No diarrhea or constipation. No melena or hematochezia.  GENITOURINARY: Denies pain or dysuria at the site of the mckeon catheter insertion. requests for mckeon to be removed.  NEUROLOGICAL: No headaches, memory loss, loss of strength, numbness, or tremors  SKIN: No itching, burning, rashes, or lesions     Vital Signs Last 24 Hrs  T(C): 36.6 (09 Aug 2021 05:09), Max: 36.6 (08 Aug 2021 14:00)  T(F): 97.8 (09 Aug 2021 05:09), Max: 97.9 (08 Aug 2021 14:00)  HR: 58 (09 Aug 2021 05:09) (58 - 90)  BP: 100/59 (09 Aug 2021 05:09) (100/59 - 126/76)  BP(mean): --  RR: 16 (09 Aug 2021 05:09) (16 - 18)  SpO2: 95% (09 Aug 2021 05:09) (95% - 98%)    PHYSICAL EXAMINATION:  GENERAL: NAD, well built  HEAD:  Atraumatic, Normocephalic  EYES:  conjunctiva and sclera clear  NECK: Supple, No JVD, Normal thyroid  CHEST/LUNG: Clear to auscultation. Clear to percussion bilaterally; No rales, rhonchi, wheezing, or rubs  HEART: Regular rate and rhythm; No murmurs, rubs, or gallops  ABDOMEN: Soft, Nontender, Nondistended; Bowel sounds present  NERVOUS SYSTEM:  Alert & Oriented X3  EXTREMITIES:  2+ Peripheral Pulses, No clubbing, cyanosis, or edema  SKIN: warm dry                          13.1   8.08  )-----------( 257      ( 08 Aug 2021 07:13 )             42.1     08-08    141  |  109<H>  |  11  ----------------------------<  115<H>  4.0   |  25  |  0.77    Ca    8.4      08 Aug 2021 07:13  Phos  2.7     08-08  Mg     2.2     08-08    TPro  6.1  /  Alb  3.1<L>  /  TBili  0.3  /  DBili  x   /  AST  12  /  ALT  34  /  AlkPhos  96  08-08    LIVER FUNCTIONS - ( 08 Aug 2021 07:13 )  Alb: 3.1 g/dL / Pro: 6.1 g/dL / ALK PHOS: 96 U/L / ALT: 34 U/L DA / AST: 12 U/L / GGT: x                                  PGY-1 Progress Note discussed with attending    PLEASE CONTACT ON CALL TEAM:  - On Call Team (Please refer to Leah) FROM 5:00 PM - 8:30PM  - Nightfloat Team FROM 8:30 -7:30 AM    CHIEF COMPLAINT & BRIEF HOSPITAL COURSE:  Pt is a 30 yo M from [facility] with PMH of Autism Spectrum disorder, Intellectual disability, ADHD, mood disorder and Asthma came to the ED with chief complaints of abdominal pain and urinary retention. Based on a [facility] volunteer, yesterday morning pt was complaining of some abdominal pain and he hadn't urinated since the previous night. When asked pt, he said he has diffuse abdominal pain and back pain. No h/o CP, SOB, headaches, dizziness, N/V. Pt was admitted to [facility] on Aug 3rd,2021 for urinary retention and discharged with a mckeon catheter, but at the group home they are not able to care for the catheter care and pt gets very agitated about it.  (07 Aug 2021 02:47)      INTERVAL HPI/OVERNIGHT EVENTS:   No overnight events reported. Patient appeared anxious, requesting to go home and states that Mckeon catheter is not needed anymore and perseverating on Mckeon catheter removal.       MEDICATIONS  (STANDING):  atorvastatin 10 milliGRAM(s) Oral at bedtime  cloNIDine 0.2 milliGRAM(s) Oral at bedtime  enoxaparin Injectable 40 milliGRAM(s) SubCutaneous daily  finasteride 5 milliGRAM(s) Oral daily  levothyroxine 50 MICROGram(s) Oral daily  pantoprazole    Tablet 40 milliGRAM(s) Oral before breakfast  phenazopyridine 200 milliGRAM(s) Oral every 8 hours  QUEtiapine 200 milliGRAM(s) Oral daily  sodium chloride 0.9%. 1000 milliLiter(s) (70 mL/Hr) IV Continuous <Continuous>  tamsulosin 0.4 milliGRAM(s) Oral at bedtime  valproic  acid Syrup 1000 milliGRAM(s) Oral two times a day    MEDICATIONS  (PRN):  acetaminophen   Tablet .. 975 milliGRAM(s) Oral every 8 hours PRN Mild Pain (1 - 3), Moderate Pain (4 - 6)  chlorproMAZINE    Injectable 50 milliGRAM(s) IntraMuscular every 6 hours PRN severe agitation not responsive to PO thorazine  chlorproMAZINE    Tablet 50 milliGRAM(s) Oral every 6 hours PRN Severe agitation not responsive to seroquel  QUEtiapine 50 milliGRAM(s) Oral every 6 hours PRN severe agitaion      REVIEW OF SYSTEMS:   CONSTITUTIONAL: No fever, weight loss, or fatigue  RESPIRATORY: No cough, wheezing, chills or hemoptysis; No shortness of breath  CARDIOVASCULAR: No chest pain, palpitations, dizziness, or leg swelling  GASTROINTESTINAL: No abdominal pain. No nausea, vomiting, or hematemesis; No diarrhea or constipation. No melena or hematochezia.  GENITOURINARY: Denies pain or dysuria at the site of the mckeon catheter insertion. requests for mckeon to be removed.  NEUROLOGICAL: No headaches, memory loss, loss of strength, numbness, or tremors  SKIN: No itching, burning, rashes, or lesions     Vital Signs Last 24 Hrs  T(C): 36.6 (09 Aug 2021 05:09), Max: 36.6 (08 Aug 2021 14:00)  T(F): 97.8 (09 Aug 2021 05:09), Max: 97.9 (08 Aug 2021 14:00)  HR: 58 (09 Aug 2021 05:09) (58 - 90)  BP: 100/59 (09 Aug 2021 05:09) (100/59 - 126/76)  BP(mean): --  RR: 16 (09 Aug 2021 05:09) (16 - 18)  SpO2: 95% (09 Aug 2021 05:09) (95% - 98%)    PHYSICAL EXAMINATION:  GENERAL: NAD, well built  HEAD:  Atraumatic, Normocephalic  EYES:  conjunctiva and sclera clear  NECK: Supple, No JVD, Normal thyroid  CHEST/LUNG: Clear to auscultation. Clear to percussion bilaterally; No rales, rhonchi, wheezing, or rubs  HEART: Regular rate and rhythm; No murmurs, rubs, or gallops  ABDOMEN: Soft, Nontender, Nondistended; Bowel sounds present, Mckeon in place   NERVOUS SYSTEM:  Alert & Oriented X3  EXTREMITIES:  2+ Peripheral Pulses, No clubbing, cyanosis, or edema  SKIN: warm dry                          13.1   8.08  )-----------( 257      ( 08 Aug 2021 07:13 )             42.1     08-08    141  |  109<H>  |  11  ----------------------------<  115<H>  4.0   |  25  |  0.77    Ca    8.4      08 Aug 2021 07:13  Phos  2.7     08-08  Mg     2.2     08-08    TPro  6.1  /  Alb  3.1<L>  /  TBili  0.3  /  DBili  x   /  AST  12  /  ALT  34  /  AlkPhos  96  08-08    LIVER FUNCTIONS - ( 08 Aug 2021 07:13 )  Alb: 3.1 g/dL / Pro: 6.1 g/dL / ALK PHOS: 96 U/L / ALT: 34 U/L DA / AST: 12 U/L / GGT: x

## 2021-08-09 NOTE — PROGRESS NOTE ADULT - PROBLEM SELECTOR PLAN 6
KEKE consulted because his current [facility] group home is unable to adequately offer staffing that can assist with patient emptying mckeon, patient may potentially need placement in a Rehab IMPROVE VTE Individual Risk Assessment  RISK                                                                Points  [  ] Previous VTE                                                  3  [  ] Thrombophilia                                               2  [  ] Lower limb paralysis                                      2        (unable to hold up >15 seconds)    [  ] Current Cancer                                              2         (within 6 months)  [x  ] Immobilization > 24 hrs                                1  [  ] ICU/CCU stay > 24 hours                              1  [  ] Age > 60                                                      1  IMPROVE VTE Score _________1,   lovenox sq for DVT proph

## 2021-08-09 NOTE — PROGRESS NOTE ADULT - PROBLEM SELECTOR PLAN 1
Pt p/w urinary retention  Vitals stable  PE: Diffuse abdominal tenderness ?fictitious  White cont 8k, UA negative  In ED pt had 450cc urinary retention  Sarabia placed  empirically started on rocephin  started on IVF  started on home meds of Flomax and Pyridium In ED p/w urinary retention, stating that is unable to pass urine unless patient has Mckeon catheter in place. Recent admission 8/3 [facility] was discharged w/out mckeon was able to pass urine successfully, and another admission to [place] for similar complaint where mckeon was re-inserted.   PE: Diffuse abdominal tenderness ?fictitious  White cont 8k, UA and UCx negative  In ED pt had 450cc urinary retention, Mckeon placed  empirically started on rocephin  started on IVF  started on home meds of Flomax and Pyridium In ED p/w urinary retention, stating that is unable to pass urine unless patient has Mckeon catheter in place. Recent admission 8/3 [facility] was discharged w/out mckeon was able to pass urine successfully, and another admission to [place] for similar complaint where mckeon was re-inserted.   PE: Diffuse abdominal tenderness ?fictitious  In ED pt had 450cc urinary retention, Mckeon placed  White cont 8k, UA and UCx negative  empirically started on rocephin  started on IVF  started on home meds of Flomax and Pyridium In ED p/w urinary retention, stating that is unable to pass urine unless w/out Mckeon catheter. Recent admission 8/3 [facility] for same complaint, discharged w/out mckeon passed TOV. Recent [place] ED visit 8/6 for same complaint where he had straight-cath.   PE: Diffuse abdominal tenderness ?fictitious  In ED pt had 450cc urinary retention, Mckeon placed  White cont 8k, empirically started on rocephin, d/c due to UA and UCx negative  started on IVF, d/c patient PO intake of fluids  started on home meds of Flomax and Pyridium  Urology consulted, familiar with patient and multiple admissions  - f/u Renal US, if no hydronephrosis attempt TOV and bladder scanner, d/c mckeon In ED p/w urinary retention, stating that is unable to pass urine unless w/out Mckeon catheter. [facility], discharged w/out mckeon passed TOV. Recent [place] ED visit 8/6 for same complaint where he had straight-cath.   In ED pt had 450cc urinary retention, Mckeon placed  started on home meds of Flomax, Finesteride and Pyridium  Urology consulted, familiar with patient and multiple admissions  - f/u Renal US or CT abd to r/o stone/ hydro if no hydronephrosis attempt TOV and bladder scanner, d/c mckeon

## 2021-08-09 NOTE — PROGRESS NOTE ADULT - PROBLEM SELECTOR PLAN 5
IMPROVE VTE Individual Risk Assessment  RISK                                                                Points  [  ] Previous VTE                                                  3  [  ] Thrombophilia                                               2  [  ] Lower limb paralysis                                      2        (unable to hold up >15 seconds)    [  ] Current Cancer                                              2         (within 6 months)  [x  ] Immobilization > 24 hrs                                1  [  ] ICU/CCU stay > 24 hours                              1  [  ] Age > 60                                                      1  IMPROVE VTE Score _________1,   lovenox sq for DVT proph started home meds of clonidine with parameters

## 2021-08-09 NOTE — CONSULT NOTE ADULT - SUBJECTIVE AND OBJECTIVE BOX
Patient is a 31y old  Male who presents with a chief complaint of Urinary retention (09 Aug 2021 11:44)      HPI:  Pt is a 30 yo M from [facility] home with PMH of Autism Spectrum disorder, Intellectual disability, ADHD, mood disorder and Asthma came to the ED with chief complaints of abdominal pain and urinary retention. Based on a [facility] volunteer, yesterday morning pt was complaining of some abdominal pain and he hadn't urinated since the previous night. When asked pt, he said he has diffuse abdominal pain and back pain. No h/o CP, SOB, headaches, dizziness, N/V. Pt was admitted to [facility] on Aug 3rd,2021 for urinary retention and discharged with a mckeon catheter, but at the [facility] home they are not able to care for the catheter care and pt gets very agitated about it.      Interval HPI:  Urology consulted for urinary retention. Pt recently discharged with resolved Mckeon was placed from ED, draining appropriate amount, concentrated.   Pt notes dysuria but no hematuria. Admits to drinking beer everyday but no water most of the time. Denies fever, chills. Back pain but no CVAT.       REVIEW OF SYSTEMS:  Negative except stated above in HPI    PAST MEDICAL & SURGICAL HISTORY:  Mood disorder    Impulse control disorder    Intellectual disability    Asthma    GERD (gastroesophageal reflux disease)    Factitious disorder    Mitral valve disease    Urinary retention    Hyperthyroidism    Enuresis    Myopia of both eyes    Autism    No significant past surgical history        MEDICATIONS  (STANDING):  atorvastatin 10 milliGRAM(s) Oral at bedtime  cloNIDine 0.2 milliGRAM(s) Oral at bedtime  enoxaparin Injectable 40 milliGRAM(s) SubCutaneous daily  finasteride 5 milliGRAM(s) Oral daily  levothyroxine 50 MICROGram(s) Oral daily  pantoprazole    Tablet 40 milliGRAM(s) Oral before breakfast  phenazopyridine 200 milliGRAM(s) Oral every 8 hours  QUEtiapine 200 milliGRAM(s) Oral daily  sodium chloride 0.9%. 1000 milliLiter(s) (70 mL/Hr) IV Continuous <Continuous>  tamsulosin 0.4 milliGRAM(s) Oral at bedtime  valproic  acid Syrup 1000 milliGRAM(s) Oral two times a day    MEDICATIONS  (PRN):  acetaminophen   Tablet .. 975 milliGRAM(s) Oral every 8 hours PRN Mild Pain (1 - 3), Moderate Pain (4 - 6)  chlorproMAZINE    Injectable 50 milliGRAM(s) IntraMuscular every 6 hours PRN severe agitation not responsive to PO thorazine  chlorproMAZINE    Tablet 50 milliGRAM(s) Oral every 6 hours PRN Severe agitation not responsive to seroquel  QUEtiapine 50 milliGRAM(s) Oral every 6 hours PRN severe agitaion      Allergies    Bee stings (Unknown)  Haldol (Other)  Zyprexa (Dystonic RXN)    Intolerances        Vital Signs Last 24 Hrs  T(C): 36.6 (09 Aug 2021 05:09), Max: 36.6 (08 Aug 2021 14:00)  T(F): 97.8 (09 Aug 2021 05:09), Max: 97.9 (08 Aug 2021 14:00)  HR: 58 (09 Aug 2021 05:09) (58 - 90)  BP: 100/59 (09 Aug 2021 05:09) (100/59 - 126/76)  BP(mean): --  RR: 16 (09 Aug 2021 05:09) (16 - 18)  SpO2: 95% (09 Aug 2021 05:09) (95% - 98%)    PHYSCAL EXAM:   General: Alert and oriented, not in acute distress  Resp: Breathing unlabored  : linda concentrated  Extremities: No pedal edema      I&O's Detail    08 Aug 2021 07:01  -  09 Aug 2021 07:00  --------------------------------------------------------  IN:  Total IN: 0 mL    OUT:    Indwelling Catheter - Urethral (mL): 1750 mL  Total OUT: 1750 mL    Total NET: -1750 mL      09 Aug 2021 07:01  -  09 Aug 2021 12:45  --------------------------------------------------------  IN:  Total IN: 0 mL    OUT:    Indwelling Catheter - Urethral (mL): 200 mL  Total OUT: 200 mL    Total NET: -200 mL          LABS:                        13.1   8.08  )-----------( 257      ( 08 Aug 2021 07:13 )             42.1              08-08    141  |  109<H>  |  11  ----------------------------<  115<H>  4.0   |  25  |  0.77    Ca    8.4      08 Aug 2021 07:13  Phos  2.7     08-08  Mg     2.2     08-08    TPro  6.1  /  Alb  3.1<L>  /  TBili  0.3  /  DBili  x   /  AST  12  /  ALT  34  /  AlkPhos  96  08-08                  RADIOLOGY & ADDITIONAL STUDIES:

## 2021-08-09 NOTE — PROGRESS NOTE ADULT - PROBLEM SELECTOR PLAN 2
started home med of synthroid Current [facility] group home is unable to adequately offer staffing that can assist with patient emptying mckeon, h/o of aggressive behavior and mood disorder. Of note, patient states drinks 4 beers a day at [facility]  SW consulted, patient may potentially need placement in a Rehab   CM consulted, aware of alcohol intake, f/u with [facility]  Psych consulted, appreciate note   -c/w meds for mood disorder and PRN meds for agitation  -f/u help-seeking behavior ?Factitious disorder Current [facility] group home is unable to adequately offer staffing that can assist with patient emptying mckeon, h/o of aggressive behavior and mood disorder. Of note, patient states drinks 4 beers a day at [facility]  SW consulted, no report of drug usage patient will return to group home  CM consulted, aware of alcohol intake, f/u with [facility]  Psych consulted, appreciate note   -c/w meds for mood disorder and PRN meds for agitation

## 2021-08-09 NOTE — PROGRESS NOTE ADULT - ATTENDING COMMENTS
Patient was seen and examined at bedside today  Agitated and wants Mckeon to be discontinued  P/E as above, no supra pubic or renal angle tenderness  Labs reviewed   Urology recommends US renal vs CT abd- if no hydronephrosis, can dc mckeon  Patient can return to group home once passed TOV  CM and SW on board   Patient is on constant observation in Group home due to behavioral issues

## 2021-08-10 ENCOUNTER — TRANSCRIPTION ENCOUNTER (OUTPATIENT)
Age: 31
End: 2021-08-10

## 2021-08-10 VITALS
DIASTOLIC BLOOD PRESSURE: 71 MMHG | OXYGEN SATURATION: 95 % | TEMPERATURE: 97 F | HEART RATE: 67 BPM | RESPIRATION RATE: 17 BRPM | SYSTOLIC BLOOD PRESSURE: 114 MMHG

## 2021-08-10 PROCEDURE — 81003 URINALYSIS AUTO W/O SCOPE: CPT

## 2021-08-10 PROCEDURE — 85025 COMPLETE CBC W/AUTO DIFF WBC: CPT

## 2021-08-10 PROCEDURE — 80048 BASIC METABOLIC PNL TOTAL CA: CPT

## 2021-08-10 PROCEDURE — 84443 ASSAY THYROID STIM HORMONE: CPT

## 2021-08-10 PROCEDURE — 80061 LIPID PANEL: CPT

## 2021-08-10 PROCEDURE — 87086 URINE CULTURE/COLONY COUNT: CPT

## 2021-08-10 PROCEDURE — 83735 ASSAY OF MAGNESIUM: CPT

## 2021-08-10 PROCEDURE — 99053 MED SERV 10PM-8AM 24 HR FAC: CPT

## 2021-08-10 PROCEDURE — 84439 ASSAY OF FREE THYROXINE: CPT

## 2021-08-10 PROCEDURE — 84100 ASSAY OF PHOSPHORUS: CPT

## 2021-08-10 PROCEDURE — 80053 COMPREHEN METABOLIC PANEL: CPT

## 2021-08-10 PROCEDURE — 85027 COMPLETE CBC AUTOMATED: CPT

## 2021-08-10 PROCEDURE — 99285 EMERGENCY DEPT VISIT HI MDM: CPT

## 2021-08-10 PROCEDURE — 84481 FREE ASSAY (FT-3): CPT

## 2021-08-10 PROCEDURE — 36415 COLL VENOUS BLD VENIPUNCTURE: CPT

## 2021-08-10 PROCEDURE — 83036 HEMOGLOBIN GLYCOSYLATED A1C: CPT

## 2021-08-10 PROCEDURE — 87635 SARS-COV-2 COVID-19 AMP PRB: CPT

## 2021-08-10 PROCEDURE — 99238 HOSP IP/OBS DSCHRG MGMT 30/<: CPT | Mod: GC

## 2021-08-10 PROCEDURE — 86769 SARS-COV-2 COVID-19 ANTIBODY: CPT

## 2021-08-10 PROCEDURE — 76775 US EXAM ABDO BACK WALL LIM: CPT

## 2021-08-10 RX ORDER — QUETIAPINE FUMARATE 200 MG/1
1 TABLET, FILM COATED ORAL
Qty: 0 | Refills: 0 | DISCHARGE
Start: 2021-08-10

## 2021-08-10 RX ADMIN — Medication 50 MICROGRAM(S): at 06:29

## 2021-08-10 RX ADMIN — Medication 200 MILLIGRAM(S): at 06:29

## 2021-08-10 RX ADMIN — LIDOCAINE 1 PATCH: 4 CREAM TOPICAL at 06:29

## 2021-08-10 RX ADMIN — PANTOPRAZOLE SODIUM 40 MILLIGRAM(S): 20 TABLET, DELAYED RELEASE ORAL at 06:29

## 2021-08-10 RX ADMIN — LIDOCAINE 1 PATCH: 4 CREAM TOPICAL at 07:00

## 2021-08-10 RX ADMIN — QUETIAPINE FUMARATE 200 MILLIGRAM(S): 200 TABLET, FILM COATED ORAL at 12:11

## 2021-08-10 RX ADMIN — FINASTERIDE 5 MILLIGRAM(S): 5 TABLET, FILM COATED ORAL at 12:11

## 2021-08-10 NOTE — PROGRESS NOTE ADULT - SUBJECTIVE AND OBJECTIVE BOX
DAILY PROGRESS NOTE:         24 hr events:  mckeon in   renal u/s showing no hydro     Objective:    Vital Signs Last 24 Hrs  T(C): 36.3 (10 Aug 2021 05:32), Max: 36.6 (09 Aug 2021 22:24)  T(F): 97.4 (10 Aug 2021 05:32), Max: 97.9 (09 Aug 2021 22:24)  HR: 67 (10 Aug 2021 05:32) (67 - 78)  BP: 114/71 (10 Aug 2021 05:32) (109/62 - 134/80)  BP(mean): --  RR: 17 (10 Aug 2021 05:32) (17 - 18)  SpO2: 95% (10 Aug 2021 05:32) (95% - 98%)    I&O's Detail    09 Aug 2021 07:01  -  10 Aug 2021 07:00  --------------------------------------------------------  IN:  Total IN: 0 mL    OUT:    Indwelling Catheter - Urethral (mL): 2400 mL  Total OUT: 2400 mL    Total NET: -2400 mL          Physical Exam:    General: NAD, well-nourished  HEENT: Atraumatic, EOMI  Resp: Breathing comfortably on RA  CV: regular rate, no edema.   Ext: ROMIx4, motor strength intact x 4  : mckeon with clear pink urine       Laboratory Results:

## 2021-08-10 NOTE — DISCHARGE NOTE NURSING/CASE MANAGEMENT/SOCIAL WORK - PATIENT PORTAL LINK FT
You can access the FollowMyHealth Patient Portal offered by [facility] by registering at the following website: [email or web address]. By joining [employer]’s FollowMyHealth portal, you will also be able to view your health information using other applications (apps) compatible with our system.

## 2021-08-10 NOTE — DISCHARGE NOTE NURSING/CASE MANAGEMENT/SOCIAL WORK - NSDCVIVACCINE_GEN_ALL_CORE_FT
Tdap; 20-Dec-2018 12:21; Lo Anaya (RN); [employer]; [id number] (Exp. Date: 02-Nov-2020); IntraMuscular; Deltoid Left.; 0.5 milliLiter(s); VIS (VIS Published: 09-May-2013, VIS Presented: 20-Dec-2018);   Tdap; 26-Mar-2019 18:59; Shavon Barrios (ANALI); [employer]; [id number] (Exp. Date: 26-Feb-2021); IntraMuscular; Deltoid Left.; 0.5 milliLiter(s); VIS (VIS Published: 09-May-2013, VIS Presented: 26-Mar-2019);

## 2021-08-10 NOTE — PROGRESS NOTE ADULT - ASSESSMENT
31 yoM with urinary retention, multiple admissions/evaluations    --AM labs pending  --U/s reviewed. Can consider Sarabia removal and trial of void  --Pt initially complained of flank pain at presentation. If he continues to do so, would recommend noncon CT to evaluate for stones/anatomical abnormality that would explain retention episodes  --C/w lifestyle modifications regarding retention. Of note pt drinks mainly beer and should be advised to consume more water   --appreciate medicine care    
Patient is a 31 year old male with a PMH of HTN, HLD, Hypothyroidism, GERD, Development Delay, Autism, Intellectual Disability, Impulse and Mood disorder, Factitious disorder, Prior Psych Admissions who was BIBEMS due to urinary retention.  Patient states that s/p his recent discharge from [facility] on 8/4/2021, he has been having difficulty with managing his indwelling mckeon catheter, along with the staff at his group home being unable to assist with emptying the bag results in patient becoming severely agitated.  As a result of patient's agitation, patient returned to [facility] ED for further management.    Of note, patient was recently admitted to [facility] on 8/3/2021 for Urinary retention and has been either seen in ED and/or admitted previously for urinary retention.       Urinary Retention  HTN  HLD  GERD  Hypothyroidism  Autism  Development delay  Autism   Intellectual disability  Impuse and Mood Disorder    Plan:  Patient also endorses inability to pass urine unless mckeon placed, though paradoxically, historically upon returning to his adult home, he becomes "anxious" (as per aide at bedside) about having the mckeon in place as well as how to drain it  - UA and urine cx negative, empiric abx discontinued  - continue Flomax and Pyridium  -Must ask  to see patient and determine if his current adult home is unable to adequately offer staffing that can assist with patient emptying mckeon, patient may potentially need placement in a SNF  - appreciate psych consult  - continue home meds   
Pt is a 30 yo M from [facility] with PMH of Autism Spectrum disorder, Intellectual disability, ADHD, mood disorder and Asthma came to the ED with chief complaints of abdominal pain and urinary retention. Admitted for urinary retention and SW consult.

## 2021-08-12 ENCOUNTER — INPATIENT (INPATIENT)
Facility: HOSPITAL | Age: 31
LOS: 0 days | Discharge: HOME | DRG: 696 | End: 2021-08-13
Attending: HOSPITALIST | Admitting: HOSPITALIST
Payer: MEDICAID

## 2021-08-12 VITALS
DIASTOLIC BLOOD PRESSURE: 78 MMHG | RESPIRATION RATE: 18 BRPM | HEART RATE: 93 BPM | SYSTOLIC BLOOD PRESSURE: 118 MMHG | WEIGHT: 251.11 LBS | TEMPERATURE: 98 F | OXYGEN SATURATION: 97 % | HEIGHT: 71 IN

## 2021-08-12 DIAGNOSIS — J45.909 UNSPECIFIED ASTHMA, UNCOMPLICATED: ICD-10-CM

## 2021-08-12 DIAGNOSIS — Z29.9 ENCOUNTER FOR PROPHYLACTIC MEASURES, UNSPECIFIED: ICD-10-CM

## 2021-08-12 DIAGNOSIS — R33.9 RETENTION OF URINE, UNSPECIFIED: ICD-10-CM

## 2021-08-12 DIAGNOSIS — F79 UNSPECIFIED INTELLECTUAL DISABILITIES: ICD-10-CM

## 2021-08-12 DIAGNOSIS — I05.9 RHEUMATIC MITRAL VALVE DISEASE, UNSPECIFIED: ICD-10-CM

## 2021-08-12 DIAGNOSIS — E03.9 HYPOTHYROIDISM, UNSPECIFIED: ICD-10-CM

## 2021-08-12 DIAGNOSIS — I10 ESSENTIAL (PRIMARY) HYPERTENSION: ICD-10-CM

## 2021-08-12 DIAGNOSIS — E05.90 THYROTOXICOSIS, UNSPECIFIED WITHOUT THYROTOXIC CRISIS OR STORM: ICD-10-CM

## 2021-08-12 DIAGNOSIS — K21.9 GASTRO-ESOPHAGEAL REFLUX DISEASE WITHOUT ESOPHAGITIS: ICD-10-CM

## 2021-08-12 LAB
ALBUMIN SERPL ELPH-MCNC: 3.6 G/DL — SIGNIFICANT CHANGE UP (ref 3.5–5)
ALP SERPL-CCNC: 121 U/L — HIGH (ref 40–120)
ALT FLD-CCNC: 35 U/L DA — SIGNIFICANT CHANGE UP (ref 10–60)
ANION GAP SERPL CALC-SCNC: 4 MMOL/L — LOW (ref 5–17)
APPEARANCE UR: CLEAR — SIGNIFICANT CHANGE UP
AST SERPL-CCNC: 18 U/L — SIGNIFICANT CHANGE UP (ref 10–40)
BASOPHILS # BLD AUTO: 0.01 K/UL — SIGNIFICANT CHANGE UP (ref 0–0.2)
BASOPHILS NFR BLD AUTO: 0.2 % — SIGNIFICANT CHANGE UP (ref 0–2)
BILIRUB SERPL-MCNC: 0.2 MG/DL — SIGNIFICANT CHANGE UP (ref 0.2–1.2)
BILIRUB UR-MCNC: NEGATIVE — SIGNIFICANT CHANGE UP
BUN SERPL-MCNC: 17 MG/DL — SIGNIFICANT CHANGE UP (ref 7–18)
CALCIUM SERPL-MCNC: 9 MG/DL — SIGNIFICANT CHANGE UP (ref 8.4–10.5)
CHLORIDE SERPL-SCNC: 109 MMOL/L — HIGH (ref 96–108)
CO2 SERPL-SCNC: 28 MMOL/L — SIGNIFICANT CHANGE UP (ref 22–31)
COLOR SPEC: YELLOW — SIGNIFICANT CHANGE UP
CREAT SERPL-MCNC: 0.83 MG/DL — SIGNIFICANT CHANGE UP (ref 0.5–1.3)
DIFF PNL FLD: ABNORMAL
EOSINOPHIL # BLD AUTO: 0.18 K/UL — SIGNIFICANT CHANGE UP (ref 0–0.5)
EOSINOPHIL NFR BLD AUTO: 2.8 % — SIGNIFICANT CHANGE UP (ref 0–6)
GLUCOSE SERPL-MCNC: 116 MG/DL — HIGH (ref 70–99)
GLUCOSE UR QL: NEGATIVE — SIGNIFICANT CHANGE UP
HCT VFR BLD CALC: 41.6 % — SIGNIFICANT CHANGE UP (ref 39–50)
HGB BLD-MCNC: 12.8 G/DL — LOW (ref 13–17)
IMM GRANULOCYTES NFR BLD AUTO: 0.5 % — SIGNIFICANT CHANGE UP (ref 0–1.5)
KETONES UR-MCNC: NEGATIVE — SIGNIFICANT CHANGE UP
LEUKOCYTE ESTERASE UR-ACNC: NEGATIVE — SIGNIFICANT CHANGE UP
LYMPHOCYTES # BLD AUTO: 2.4 K/UL — SIGNIFICANT CHANGE UP (ref 1–3.3)
LYMPHOCYTES # BLD AUTO: 37.3 % — SIGNIFICANT CHANGE UP (ref 13–44)
MCHC RBC-ENTMCNC: 23.5 PG — LOW (ref 27–34)
MCHC RBC-ENTMCNC: 30.8 GM/DL — LOW (ref 32–36)
MCV RBC AUTO: 76.5 FL — LOW (ref 80–100)
MONOCYTES # BLD AUTO: 0.47 K/UL — SIGNIFICANT CHANGE UP (ref 0–0.9)
MONOCYTES NFR BLD AUTO: 7.3 % — SIGNIFICANT CHANGE UP (ref 2–14)
NEUTROPHILS # BLD AUTO: 3.35 K/UL — SIGNIFICANT CHANGE UP (ref 1.8–7.4)
NEUTROPHILS NFR BLD AUTO: 51.9 % — SIGNIFICANT CHANGE UP (ref 43–77)
NITRITE UR-MCNC: NEGATIVE — SIGNIFICANT CHANGE UP
NRBC # BLD: 0 /100 WBCS — SIGNIFICANT CHANGE UP (ref 0–0)
PH UR: 6 — SIGNIFICANT CHANGE UP (ref 5–8)
PLATELET # BLD AUTO: 279 K/UL — SIGNIFICANT CHANGE UP (ref 150–400)
POTASSIUM SERPL-MCNC: 4 MMOL/L — SIGNIFICANT CHANGE UP (ref 3.5–5.3)
POTASSIUM SERPL-SCNC: 4 MMOL/L — SIGNIFICANT CHANGE UP (ref 3.5–5.3)
PROT SERPL-MCNC: 7.1 G/DL — SIGNIFICANT CHANGE UP (ref 6–8.3)
PROT UR-MCNC: 15
RBC # BLD: 5.44 M/UL — SIGNIFICANT CHANGE UP (ref 4.2–5.8)
RBC # FLD: 14.9 % — HIGH (ref 10.3–14.5)
SARS-COV-2 RNA SPEC QL NAA+PROBE: SIGNIFICANT CHANGE UP
SODIUM SERPL-SCNC: 141 MMOL/L — SIGNIFICANT CHANGE UP (ref 135–145)
SP GR SPEC: 1.02 — SIGNIFICANT CHANGE UP (ref 1.01–1.02)
UROBILINOGEN FLD QL: NEGATIVE — SIGNIFICANT CHANGE UP
WBC # BLD: 6.44 K/UL — SIGNIFICANT CHANGE UP (ref 3.8–10.5)
WBC # FLD AUTO: 6.44 K/UL — SIGNIFICANT CHANGE UP (ref 3.8–10.5)

## 2021-08-12 PROCEDURE — 99284 EMERGENCY DEPT VISIT MOD MDM: CPT

## 2021-08-12 PROCEDURE — 99222 1ST HOSP IP/OBS MODERATE 55: CPT

## 2021-08-12 PROCEDURE — 74176 CT ABD & PELVIS W/O CONTRAST: CPT | Mod: 26

## 2021-08-12 RX ORDER — ENOXAPARIN SODIUM 100 MG/ML
40 INJECTION SUBCUTANEOUS DAILY
Refills: 0 | Status: DISCONTINUED | OUTPATIENT
Start: 2021-08-12 | End: 2021-08-13

## 2021-08-12 RX ORDER — LANOLIN ALCOHOL/MO/W.PET/CERES
3 CREAM (GRAM) TOPICAL AT BEDTIME
Refills: 0 | Status: DISCONTINUED | OUTPATIENT
Start: 2021-08-12 | End: 2021-08-13

## 2021-08-12 RX ORDER — LEVOTHYROXINE SODIUM 125 MCG
50 TABLET ORAL DAILY
Refills: 0 | Status: DISCONTINUED | OUTPATIENT
Start: 2021-08-12 | End: 2021-08-13

## 2021-08-12 RX ORDER — QUETIAPINE FUMARATE 200 MG/1
200 TABLET, FILM COATED ORAL DAILY
Refills: 0 | Status: DISCONTINUED | OUTPATIENT
Start: 2021-08-12 | End: 2021-08-13

## 2021-08-12 RX ORDER — QUETIAPINE FUMARATE 200 MG/1
50 TABLET, FILM COATED ORAL EVERY 6 HOURS
Refills: 0 | Status: DISCONTINUED | OUTPATIENT
Start: 2021-08-12 | End: 2021-08-13

## 2021-08-12 RX ORDER — SIMVASTATIN 20 MG/1
20 TABLET, FILM COATED ORAL AT BEDTIME
Refills: 0 | Status: DISCONTINUED | OUTPATIENT
Start: 2021-08-12 | End: 2021-08-13

## 2021-08-12 RX ORDER — FINASTERIDE 5 MG/1
5 TABLET, FILM COATED ORAL DAILY
Refills: 0 | Status: DISCONTINUED | OUTPATIENT
Start: 2021-08-12 | End: 2021-08-13

## 2021-08-12 RX ORDER — PANTOPRAZOLE SODIUM 20 MG/1
40 TABLET, DELAYED RELEASE ORAL
Refills: 0 | Status: DISCONTINUED | OUTPATIENT
Start: 2021-08-12 | End: 2021-08-13

## 2021-08-12 RX ORDER — PHENAZOPYRIDINE HCL 100 MG
200 TABLET ORAL EVERY 8 HOURS
Refills: 0 | Status: DISCONTINUED | OUTPATIENT
Start: 2021-08-12 | End: 2021-08-13

## 2021-08-12 RX ORDER — KETOROLAC TROMETHAMINE 30 MG/ML
30 SYRINGE (ML) INJECTION EVERY 6 HOURS
Refills: 0 | Status: DISCONTINUED | OUTPATIENT
Start: 2021-08-12 | End: 2021-08-12

## 2021-08-12 RX ORDER — ONDANSETRON 8 MG/1
4 TABLET, FILM COATED ORAL EVERY 8 HOURS
Refills: 0 | Status: DISCONTINUED | OUTPATIENT
Start: 2021-08-12 | End: 2021-08-13

## 2021-08-12 RX ORDER — TAMSULOSIN HYDROCHLORIDE 0.4 MG/1
0.4 CAPSULE ORAL AT BEDTIME
Refills: 0 | Status: DISCONTINUED | OUTPATIENT
Start: 2021-08-12 | End: 2021-08-13

## 2021-08-12 RX ORDER — ACETAMINOPHEN 500 MG
650 TABLET ORAL EVERY 6 HOURS
Refills: 0 | Status: DISCONTINUED | OUTPATIENT
Start: 2021-08-12 | End: 2021-08-13

## 2021-08-12 RX ORDER — VALPROIC ACID (AS SODIUM SALT) 250 MG/5ML
250 SOLUTION, ORAL ORAL
Refills: 0 | Status: DISCONTINUED | OUTPATIENT
Start: 2021-08-12 | End: 2021-08-13

## 2021-08-12 NOTE — H&P ADULT - PROBLEM SELECTOR PLAN 5
Lovenox 40 qd   IMPROVE VTE Individual Risk Assessment  RISK                                                                Points  [  ] Previous VTE                                                  3  [  ] Thrombophilia                                               2  [  ] Lower limb paralysis                                      2        (unable to hold up >15 seconds)    [  ] Current Cancer                                              2         (within 6 months)  [  ] Immobilization > 24 hrs                                1  [  ] ICU/CCU stay > 24 hours                              1  [  ] Age > 60                                                      1  IMPROVE VTE Score ____0_____

## 2021-08-12 NOTE — ED ADULT NURSE NOTE - ED STAT RN HANDOFF DETAILS 2
Patient discharged back to group home as per MD order. IV access removed by RN, no redness ,swelling or bleeding noted at site. All discharge instructions and F/u visits provided to patient and staff member of facility. Both verbalizes understanding leaving ambulatory in no acute distress.

## 2021-08-12 NOTE — ED PROVIDER NOTE - CLINICAL SUMMARY MEDICAL DECISION MAKING FREE TEXT BOX
pt presents with urinary retention and suprapubic pain.  Will place mckeon, check labs, and reassess.

## 2021-08-12 NOTE — H&P ADULT - PROBLEM SELECTOR PLAN 3
On Clonidine 0.2mg HS   Unusual first line medication - will continue w/ parameters due to risk of rebound HTN

## 2021-08-12 NOTE — ED ADULT NURSE NOTE - CHIEF COMPLAINT QUOTE
bviba sent from [facility] facility for lower abdominal pain , states unable to urinate x 2 days

## 2021-08-12 NOTE — H&P ADULT - NSICDXPASTMEDICALHX_GEN_ALL_CORE_FT
PAST MEDICAL HISTORY:  Asthma     Autism     Enuresis     Factitious disorder     GERD (gastroesophageal reflux disease)     Hyperthyroidism     Impulse control disorder     Intellectual disability     Mitral valve disease     Mood disorder     Myopia of both eyes     Urinary retention      PAST MEDICAL HISTORY:  Asthma     Autism     Enuresis     Factitious disorder     GERD (gastroesophageal reflux disease)     Hypothyroidism     Intellectual disability     Mood disorder     Myopia of both eyes     Urinary retention

## 2021-08-12 NOTE — ED PROVIDER NOTE - PROGRESS NOTE DETAILS
Pt with urinary retention.  Sarabia placed with 400-500 of urine draining.  Case discussed with Dr. Lutz, who recommends medicine admission and psychiatric eval for possible mediciation inducing retention.  Case endorsed to Dr. Chavez.

## 2021-08-12 NOTE — ED PROVIDER NOTE - OBJECTIVE STATEMENT
30 y/o male with PMHx of autism, hypothyroid, GERD, asthma and mood disorder presents with urinary retention. As per patient he has not urinated since yesterday afternoon.  Pt has had a mckeon in past and is requesting mckeon.

## 2021-08-12 NOTE — H&P ADULT - ASSESSMENT
Patient is a 30 y/o male from [facility] PMHx of Autism Spectrum disorder, Intellectual disability, ADHD, mood disorder and Asthma who was sent from his Group Home with complaints of abdominal pain and urinary retention; Admitted for multiple recurrent episodes of AUR.     IN the ED,  VS: 97.7F, HR 93bpm, /78mmHg, RR 18, Spo2 97% on RA   Labs significant for Hb 12.8g/dl MCV 76.5, UA -ve AlkPhos 121  Sarabia Cath voided 500cc of urine   Pending CT Abdomen Non Con

## 2021-08-12 NOTE — H&P ADULT - ATTENDING COMMENTS
Patient is a 31 year old male with a PMH of HTN, HLD, Prediabetes, Hypothyroidism, GERD, Development Delay, Autism, Intellectual Disability, Impulse and Mood disorder, Factitious Disorder, Prior Psych Admissions who was BIBEMS due to urinary retention.  Patient states that s/p his recent discharge from [facility] on 8/10/2021, he has been having persistent right-sided abdominal pain as well as difficulty with urination.    At time of examination in the ED, patient states that he needs to be admitted to [address] [place] [address], started on antibiotics, stating he's "ready for surgery if I need it".  However, patient denies any headache, dizziness, chest pain, palpitations, shortness of breath, nausea/vomiting/diarrhea.    T(C): 36.5 (08-12-21 @ 18:15), Max: 36.5 (08-12-21 @ 18:15)  T(F): 97.7 (08-12-21 @ 18:15), Max: 97.7 (08-12-21 @ 18:15)  HR: 93 (08-12-21 @ 18:15) (93 - 93)  BP: 118/78 (08-12-21 @ 18:15) (118/78 - 118/78)  RR: 18 (08-12-21 @ 18:15) (18 - 18)  SpO2: 97% (08-12-21 @ 18:15) (97% - 97%)  Wt(kg): --    P/E: As above MAR    A/P:    Urinary Retention:  -Patient endorses persistent right-sided abdominal pain  -Tylenol PRN for   -Will resume Flomax, Pyridium and Lidocaine gel to tip of penis  -Will empirically initiate patient on Rocephin, for now  -Must ask  to see patient and determine if his current adult home is unable to adequately offer staffing that can assist with patient emptying mckeon, patient may potentially need placement in a SNF  -ED Attending spoke with Urology regarding case, advised to have Psych evaluate patient for thorough medication review     HTN:  -Will resume patient's home medications  -Vital Signs Q4H    HLD:  -Will resume patient's home medication    Prediabetes:  -Hemoglobin A1c= 6.0% on 8/7/2021  -Blood Glucose Monitoring ACHS  -Regular Insulin Sliding Scale ACHS  -Carb Controlled, Heart Healthy, Low Sodium diet as tolerated    Elevated Alk Phos:  -Will send GGT    Microcytic Anemia:  -Will send Iron Panel (Iron, TIBC, Ferritin) and Retic Count    Hypothyroidism:  -Will resume patient's home medication    GERD:  -Will resume patient's home medication    Impulse and Mood Disorder:  -Will resume patient's home medications    GI/DVT PPx:  -Lovenox  -PPI Patient is a 31 year old male with a PMH of HTN, HLD, Prediabetes, Hypothyroidism, GERD, Development Delay, Autism, Intellectual Disability, Impulse and Mood disorder, Factitious Disorder, Prior Psych Admissions who was BIBEMS due to urinary retention.  Patient states [facility], he has been having persistent right-sided abdominal pain as well as difficulty with urination.    At time of examination in the ED, patient states that he needs to be admitted to [address] [place] [address], started on antibiotics, stating he's "ready for surgery if I need it".  However, patient denies any headache, dizziness, chest pain, palpitations, shortness of breath, nausea/vomiting/diarrhea.    T(C): 36.5 (08-12-21 @ 18:15), Max: 36.5 (08-12-21 @ 18:15)  T(F): 97.7 (08-12-21 @ 18:15), Max: 97.7 (08-12-21 @ 18:15)  HR: 93 (08-12-21 @ 18:15) (93 - 93)  BP: 118/78 (08-12-21 @ 18:15) (118/78 - 118/78)  RR: 18 (08-12-21 @ 18:15) (18 - 18)  SpO2: 97% (08-12-21 @ 18:15) (97% - 97%)  Wt(kg): --    P/E: As above MAR    A/P:    Urinary Retention:  -Patient endorses persistent right-sided abdominal pain  -Tylenol PRN for   -Will resume Flomax, Pyridium and Lidocaine gel to tip of penis  -Will start patient on Rocephin  -Must ask  to see patient and determine if his current adult home is unable to adequately offer staffing that can assist with patient emptying mckeon, patient may potentially need placement in a SNF  -ED Attending spoke with Urology regarding case, advised to have Psych evaluate patient for thorough medication review     HTN:  -Will resume patient's home medications  -Vital Signs Q4H    HLD:  -Will resume patient's home medication    Prediabetes:  -Hemoglobin A1c= 6.0% on 8/7/2021  -Blood Glucose Monitoring ACHS  -Regular Insulin Sliding Scale ACHS  -Carb Controlled, Heart Healthy, Low Sodium diet as tolerated    Elevated Alk Phos:  -Will send GGT    Microcytic Anemia:  -Will send Iron Panel (Iron, TIBC, Ferritin) and Retic Count    Hypothyroidism:  -Will resume patient's home medication    GERD:  -Will resume patient's home medication    Impulse and Mood Disorder:  -Will resume patient's home medications    GI/DVT PPx:  -Lovenox  -PPI Patient is a 31 year old male with a PMH of HTN, HLD, Prediabetes, Hypothyroidism, GERD, Development Delay, Autism, Intellectual Disability, Impulse and Mood disorder, Factitious Disorder, Prior Psych Admissions who was BIBEMS due to urinary retention.  Patient states that s/p his recent discharge from [facility] on 8/10/2021, he has been having persistent right-sided abdominal pain as well as difficulty with urination.    At time of examination in the ED, patient states that he needs to be admitted to [address] [place] [address], started on antibiotics, stating he's "ready for surgery if I need it".  However, patient denies any headache, dizziness, chest pain, palpitations, shortness of breath, nausea/vomiting/diarrhea.    T(C): 36.5 (08-12-21 @ 18:15), Max: 36.5 (08-12-21 @ 18:15)  T(F): 97.7 (08-12-21 @ 18:15), Max: 97.7 (08-12-21 @ 18:15)  HR: 93 (08-12-21 @ 18:15) (93 - 93)  BP: 118/78 (08-12-21 @ 18:15) (118/78 - 118/78)  RR: 18 (08-12-21 @ 18:15) (18 - 18)  SpO2: 97% (08-12-21 @ 18:15) (97% - 97%)  Wt(kg): --    P/E: As above MAR    A/P:    Urinary Retention:  -Ultrasound Renal (8/9/2021) identified no hydronephrosis. Urinary bladder collapsed around Mckeon catheter.  -Patient endorses persistent right-sided abdominal pain  -Tylenol PRN for   -Will resume Flomax, Pyridium and Lidocaine gel to tip of penis  -Will start patient on Rocephin  -Must ask  to see patient and determine if his current adult home is unable to adequately offer staffing that can assist with patient emptying mckeon, patient may potentially need placement in a SNF  -ED Attending spoke with Urology regarding case, advised to have Psych evaluate patient for thorough medication review     HTN:  -Will resume patient's home medications  -Vital Signs Q4H    HLD:  -Will resume patient's home medication    Prediabetes:  -Hemoglobin A1c= 6.0% on 8/7/2021  -Blood Glucose Monitoring ACHS  -Regular Insulin Sliding Scale ACHS  -Carb Controlled, Heart Healthy, Low Sodium diet as tolerated    Elevated Alk Phos:  -Will send GGT    Microcytic Anemia:  -Will send Iron Panel (Iron, TIBC, Ferritin) and Retic Count    Hypothyroidism:  -Will resume patient's home medication    GERD:  -Will resume patient's home medication    Impulse and Mood Disorder:  -Will resume patient's home medications    GI/DVT PPx:  -Lovenox  -PPI Patient is a 31 year old male with a PMH of HTN, HLD, Prediabetes, Hypothyroidism, GERD, Development Delay, Autism, Intellectual Disability, Impulse and Mood disorder, Factitious Disorder, Prior Psych Admissions who was BIBEMS due to urinary retention.  Patient states that s/p his recent discharge from [facility] on 8/10/2021, he has been having persistent right-sided abdominal pain as well as difficulty with urination.    At time of examination in the ED, patient states that he needs to be admitted to [address] [place] [address], started on antibiotics, stating he's "ready for surgery if I need it".  However, patient denies any headache, dizziness, chest pain, palpitations, shortness of breath, nausea/vomiting/diarrhea.    T(C): 36.5 (08-12-21 @ 18:15), Max: 36.5 (08-12-21 @ 18:15)  T(F): 97.7 (08-12-21 @ 18:15), Max: 97.7 (08-12-21 @ 18:15)  HR: 93 (08-12-21 @ 18:15) (93 - 93)  BP: 118/78 (08-12-21 @ 18:15) (118/78 - 118/78)  RR: 18 (08-12-21 @ 18:15) (18 - 18)  SpO2: 97% (08-12-21 @ 18:15) (97% - 97%)  Wt(kg): --    P/E: As above MAR    A/P:    Urinary Retention:  -Ultrasound Renal (8/9/2021) identified no hydronephrosis. Urinary bladder collapsed around Mckeon catheter  -UA is grossly unremarkable for an infection  -Patient endorses persistent right-sided abdominal pain  -Tylenol PRN for   -Will resume Flomax, Pyridium and Lidocaine gel to tip of penis  -Will start patient on Rocephin  -Must ask  to see patient and determine if his current adult home is unable to adequately offer staffing that can assist with patient emptying mckeon, patient may potentially need placement in a SNF  -ED Attending spoke with Urology regarding case, advised to have Psych evaluate patient for thorough medication review     HTN:  -Will resume patient's home medications  -Vital Signs Q4H    HLD:  -Will resume patient's home medication    Prediabetes:  -Hemoglobin A1c= 6.0% on 8/7/2021  -Blood Glucose Monitoring ACHS  -Regular Insulin Sliding Scale ACHS  -Carb Controlled, Heart Healthy, Low Sodium diet as tolerated    Elevated Alk Phos:  -Will send GGT    Microcytic Anemia:  -Will send Iron Panel (Iron, TIBC, Ferritin) and Retic Count    Hypothyroidism:  -Will resume patient's home medication    GERD:  -Will resume patient's home medication    Impulse and Mood Disorder:  -Will resume patient's home medications    GI/DVT PPx:  -Lovenox  -PPI

## 2021-08-12 NOTE — H&P ADULT - HISTORY OF PRESENT ILLNESS
Patient is a 32 y/o male from [facility] home PMHx of Autism Spectrum disorder, Intellectual disability, ADHD, mood disorder and Asthma who was sent from his Group Home with complaints of abdominal pain and urinary retention. Patient has been admitted to [facility] multiple times in the past few weeks for similar complaints, and had passed TOV prior to discharge 8/10. Pt relates that he has not been able to void and had associated supraubic and R flank pain for 12hrs prior to admission. He denies any N/V/D, chest pain, SOB, headaches, dizziness. Of note: the [facility] is unable to care for indwelling catheters and pt frequently gets agitated due to these complaints precipitating his mood disorder/aggression.

## 2021-08-12 NOTE — ED ADULT NURSE NOTE - OBJECTIVE STATEMENT
pt is here for abdominal pain.  BIBA, sending from group home, abdominal pain, unable to urinate for 2 days, denied chest pain or sob, denied N/V/D, denied fever or chills, no distress noted at this time.

## 2021-08-12 NOTE — H&P ADULT - PROBLEM SELECTOR PLAN 1
Repeated episodes of AUR   Vitals stable; had exaggerated R flank Tenderness (jumps at light touch) ?fictitious  EBC 6.4k, UA negative  Sarabia voided 500cc, to continue   C/w IVF, Flomax, Proscar   F/u CT abdomen Non Con to r/o organic causes of obstruction/pain   Recent Renal US (3 days ago) showed no Hydronephrosis   Urology on Board

## 2021-08-12 NOTE — ED ADULT TRIAGE NOTE - CHIEF COMPLAINT QUOTE
bviba sent from [facility] facility for lower abdominal pain , states unable to urinate x 2 days

## 2021-08-12 NOTE — H&P ADULT - NSHPPHYSICALEXAM_GEN_ALL_CORE
GENERAL APPEARANCE: Well developed, AA0x3, in NAD   THROAT: Oral cavity and pharynx normal. No inflammation, swelling, exudate, or lesions.  CARDIAC: Normal S1 and S2. No S3, S4 or murmurs. Rhythm is regular. There is no peripheral edema, cyanosis or pallor.  LUNGS: Clear to auscultation and percussion without rales, rhonchi, wheezing or diminished breath sounds.  ABDOMEN: Positive bowel sounds. Soft, nondistended, nontender. No guarding or rebound. No masses.  EXTREMITIES: No significant deformity or joint abnormality. No edema. Peripheral pulses intact. No varicosities.  NEUROLOGICAL: CN II-XII intact. Strength and sensation symmetric and intact throughout. Reflexes 2+ throughout.   SKIN: No skin breakdown, lesions or rashes noted GENERAL APPEARANCE: Well developed  male w/ multiple tattoos, AA0x3, in NAD, Anxious  THROAT: Oral cavity and pharynx normal. No inflammation, swelling, exudate, or lesions.  CARDIAC: Normal S1 and S2. No S3, S4 or murmurs. Rhythm is regular. There is no peripheral edema, cyanosis or pallor.  LUNGS: Clear to auscultation and percussion without rales, rhonchi, wheezing or diminished breath sounds.  ABDOMEN: R flank and suprapubic tenderness, Soft, nondistended. Exaggerated guarding, No rebound. No masses.  EXTREMITIES: No significant deformity or joint abnormality. No edema. Peripheral pulses intact. No varicosities.  NEUROLOGICAL: CN II-XII intact. Strength and sensation symmetric and intact throughout.   SKIN: No skin breakdown, lesions or rashes noted; multiple tattoos

## 2021-08-13 ENCOUNTER — TRANSCRIPTION ENCOUNTER (OUTPATIENT)
Age: 31
End: 2021-08-13

## 2021-08-13 VITALS
DIASTOLIC BLOOD PRESSURE: 83 MMHG | SYSTOLIC BLOOD PRESSURE: 136 MMHG | TEMPERATURE: 98 F | HEART RATE: 73 BPM | OXYGEN SATURATION: 98 % | RESPIRATION RATE: 17 BRPM

## 2021-08-13 LAB
ANION GAP SERPL CALC-SCNC: 8 MMOL/L — SIGNIFICANT CHANGE UP (ref 5–17)
BUN SERPL-MCNC: 15 MG/DL — SIGNIFICANT CHANGE UP (ref 7–18)
CALCIUM SERPL-MCNC: 9 MG/DL — SIGNIFICANT CHANGE UP (ref 8.4–10.5)
CHLORIDE SERPL-SCNC: 107 MMOL/L — SIGNIFICANT CHANGE UP (ref 96–108)
CO2 SERPL-SCNC: 24 MMOL/L — SIGNIFICANT CHANGE UP (ref 22–31)
COVID-19 SPIKE DOMAIN AB INTERP: POSITIVE
COVID-19 SPIKE DOMAIN ANTIBODY RESULT: 183 U/ML — HIGH
CREAT SERPL-MCNC: 0.69 MG/DL — SIGNIFICANT CHANGE UP (ref 0.5–1.3)
CULTURE RESULTS: NO GROWTH — SIGNIFICANT CHANGE UP
GLUCOSE SERPL-MCNC: 93 MG/DL — SIGNIFICANT CHANGE UP (ref 70–99)
HCT VFR BLD CALC: 42.4 % — SIGNIFICANT CHANGE UP (ref 39–50)
HGB BLD-MCNC: 13 G/DL — SIGNIFICANT CHANGE UP (ref 13–17)
MCHC RBC-ENTMCNC: 23.8 PG — LOW (ref 27–34)
MCHC RBC-ENTMCNC: 30.7 GM/DL — LOW (ref 32–36)
MCV RBC AUTO: 77.7 FL — LOW (ref 80–100)
NRBC # BLD: 0 /100 WBCS — SIGNIFICANT CHANGE UP (ref 0–0)
PLATELET # BLD AUTO: 232 K/UL — SIGNIFICANT CHANGE UP (ref 150–400)
POTASSIUM SERPL-MCNC: 4.4 MMOL/L — SIGNIFICANT CHANGE UP (ref 3.5–5.3)
POTASSIUM SERPL-SCNC: 4.4 MMOL/L — SIGNIFICANT CHANGE UP (ref 3.5–5.3)
RBC # BLD: 5.46 M/UL — SIGNIFICANT CHANGE UP (ref 4.2–5.8)
RBC # FLD: 15.1 % — HIGH (ref 10.3–14.5)
SARS-COV-2 IGG+IGM SERPL QL IA: 183 U/ML — HIGH
SARS-COV-2 IGG+IGM SERPL QL IA: POSITIVE
SODIUM SERPL-SCNC: 139 MMOL/L — SIGNIFICANT CHANGE UP (ref 135–145)
SPECIMEN SOURCE: SIGNIFICANT CHANGE UP
WBC # BLD: 5.54 K/UL — SIGNIFICANT CHANGE UP (ref 3.8–10.5)
WBC # FLD AUTO: 5.54 K/UL — SIGNIFICANT CHANGE UP (ref 3.8–10.5)

## 2021-08-13 PROCEDURE — 81001 URINALYSIS AUTO W/SCOPE: CPT

## 2021-08-13 PROCEDURE — 87635 SARS-COV-2 COVID-19 AMP PRB: CPT

## 2021-08-13 PROCEDURE — 99284 EMERGENCY DEPT VISIT MOD MDM: CPT

## 2021-08-13 PROCEDURE — 80053 COMPREHEN METABOLIC PANEL: CPT

## 2021-08-13 PROCEDURE — 87086 URINE CULTURE/COLONY COUNT: CPT

## 2021-08-13 PROCEDURE — 74176 CT ABD & PELVIS W/O CONTRAST: CPT

## 2021-08-13 PROCEDURE — 86769 SARS-COV-2 COVID-19 ANTIBODY: CPT

## 2021-08-13 PROCEDURE — 85025 COMPLETE CBC W/AUTO DIFF WBC: CPT

## 2021-08-13 PROCEDURE — 36415 COLL VENOUS BLD VENIPUNCTURE: CPT

## 2021-08-13 PROCEDURE — 80048 BASIC METABOLIC PNL TOTAL CA: CPT

## 2021-08-13 PROCEDURE — 85027 COMPLETE CBC AUTOMATED: CPT

## 2021-08-13 PROCEDURE — 99238 HOSP IP/OBS DSCHRG MGMT 30/<: CPT

## 2021-08-13 RX ORDER — LIDOCAINE 4 G/100G
5 CREAM TOPICAL ONCE
Refills: 0 | Status: DISCONTINUED | OUTPATIENT
Start: 2021-08-13 | End: 2021-08-13

## 2021-08-13 RX ADMIN — Medication 250 MILLIGRAM(S): at 06:36

## 2021-08-13 RX ADMIN — ENOXAPARIN SODIUM 40 MILLIGRAM(S): 100 INJECTION SUBCUTANEOUS at 11:29

## 2021-08-13 RX ADMIN — Medication 200 MILLIGRAM(S): at 13:45

## 2021-08-13 RX ADMIN — Medication 50 MICROGRAM(S): at 06:36

## 2021-08-13 RX ADMIN — Medication 200 MILLIGRAM(S): at 06:36

## 2021-08-13 RX ADMIN — QUETIAPINE FUMARATE 200 MILLIGRAM(S): 200 TABLET, FILM COATED ORAL at 11:29

## 2021-08-13 RX ADMIN — FINASTERIDE 5 MILLIGRAM(S): 5 TABLET, FILM COATED ORAL at 11:29

## 2021-08-13 RX ADMIN — PANTOPRAZOLE SODIUM 40 MILLIGRAM(S): 20 TABLET, DELAYED RELEASE ORAL at 06:36

## 2021-08-13 NOTE — DISCHARGE NOTE PROVIDER - HOSPITAL COURSE
Patient is a 32 y/o male from [facility] PMHx of Autism Spectrum disorder, Intellectual disability, ADHD, mood disorder and Asthma who was sent from his Group Home with complaints of abdominal pain and urinary retention. Patient has been admitted to [facility] multiple times in the past few weeks for similar complaints, and had passed TOV prior to discharge 8/10.  Group home [facility] is unable to care for indwelling catheters and pt frequently gets agitated due to these complaints precipitating his mood disorder/aggression.   Patient admitted to medicine for urinary retention, mckeon catheter inserted in ED with 500cc clear output. CT abdomen negative for hydronephrosis or obstructive stone. UA negative for infection. Patient verbalized he wants mckeon catheter removed. Pt passed TOV urinating over 400cc of clear urine, denies burning or abdominal pain.       Given patient's improved clinical status and current hemodynamic stability, decision was made to discharge.  Please refer to patient's complete medical chart with documents for a full hospital course, for this is only a brief summary.

## 2021-08-13 NOTE — DISCHARGE NOTE NURSING/CASE MANAGEMENT/SOCIAL WORK - PATIENT PORTAL LINK FT
You can access the FollowMyHealth Patient Portal offered by [facility] by registering at the following website: [email or web address]. By joining [employer]’s FollowMyHealth portal, you will also be able to view your health information using other applications (apps) compatible with our system.

## 2021-08-13 NOTE — DISCHARGE NOTE PROVIDER - CARE PROVIDER_API CALL
Gus Birmingham (MD)  Family Medicine  [address] [place] [address]  Phone: [phone]  Fax: [phone]  Follow Up Time: 1-3 days

## 2021-08-13 NOTE — DISCHARGE NOTE NURSING/CASE MANAGEMENT/SOCIAL WORK - NSDCVIVACCINE_GEN_ALL_CORE_FT
Tdap; 20-Dec-2018 12:21; Lo Anaya (RN); [employer]; [id number] (Exp. Date: 02-Nov-2020); IntraMuscular; Deltoid Left.; 0.5 milliLiter(s); VIS (VIS Published: 09-May-2013, VIS Presented: 20-Dec-2018);   Tdap; 26-Mar-2019 18:59; Shavon Barrios (ANALI); [employer]; [id number] (Exp. Date: 26-Feb-2021); IntraMuscular; Deltoid Left.; 0.5 milliLiter(s); VIS (VIS Published: 09-May-2013, VIS Presented: 26-Mar-2019);

## 2021-08-13 NOTE — DISCHARGE NOTE PROVIDER - NSDCCPCAREPLAN_GEN_ALL_CORE_FT
PRINCIPAL DISCHARGE DIAGNOSIS  Diagnosis: Urinary retention  Assessment and Plan of Treatment: You were admitted to hospital for urinary retention and mckeon catheter was inserted to help you pass urine. You are able to pass trial of void today and urinate on your own.  Call your pcp if you anable to urinate ot having burning sensation or frequent urination.  Follow up with your pcp for scheduled visits.      SECONDARY DISCHARGE DIAGNOSES  Diagnosis: Hypothyroidism  Assessment and Plan of Treatment: you do not make enough thyroid hormone  signs & symptoms of low levels of thyroid hormone - tired, getting cold easily, coarse or thin hair, constipation, shortness of breath, swelling, irregular periods  your doctor will do thyroid hormone blood tests at least once a year to monitor if medication dose is adequate  take your thyroid medicine as directed by your doctor & on empty stomach      Diagnosis: Intellectual disability  Assessment and Plan of Treatment: You have been diagnosed with autism, ADHD, intelectual disability and are staying in group home.   Take your medications as prescribed by your pcp, follow up with  dr. Duffy.

## 2021-08-13 NOTE — DISCHARGE NOTE PROVIDER - NSDCMRMEDTOKEN_GEN_ALL_CORE_FT
cloNIDine 0.1 mg oral tablet: 2 tab(s) orally once a day (at bedtime)  Depakene 250 mg/5 mL oral liquid: 20 milliliter(s) orally 2 times a day  finasteride 5 mg oral tablet: 1 tab(s) orally once a day  levothyroxine 50 mcg (0.05 mg) oral tablet: 1 tab(s) orally once a day  pantoprazole 20 mg oral delayed release tablet: 1 tab(s) orally once a day  phenazopyridine 200 mg oral tablet: 1 tab(s) orally every 8 hours  pravastatin 20 mg oral tablet: 1 tab(s) orally once a day  QUEtiapine 200 mg oral tablet: 1 tab(s) orally once a day  QUEtiapine 50 mg oral tablet: 1 tab(s) orally every 6 hours, As needed, severe agitaion  tamsulosin 0.4 mg oral capsule: 1 cap(s) orally once a day (at bedtime)  Vitamin D2 50,000 intl units (1.25 mg) oral capsule: 1 cap(s) orally once a week

## 2021-08-13 NOTE — DISCHARGE NOTE PROVIDER - ATTENDING COMMENTS
Admitted for possible urinary retention, evaluated by urology, well known to service, passed TOV, and urinated multiple times since removal of mckeon. No evidence of infection, tolerating diet well, ambulating independently, vitals are stable. He is stable for discharge back to his group home.

## 2021-09-25 ENCOUNTER — EMERGENCY (EMERGENCY)
Facility: HOSPITAL | Age: 31
LOS: 1 days | Discharge: HOME | End: 2021-09-25
Attending: EMERGENCY MEDICINE | Admitting: EMERGENCY MEDICINE
Payer: MEDICAID

## 2021-09-25 VITALS
RESPIRATION RATE: 16 BRPM | SYSTOLIC BLOOD PRESSURE: 116 MMHG | TEMPERATURE: 96 F | OXYGEN SATURATION: 100 % | HEART RATE: 78 BPM | HEIGHT: 71 IN | DIASTOLIC BLOOD PRESSURE: 84 MMHG

## 2021-09-25 PROCEDURE — 73130 X-RAY EXAM OF HAND: CPT | Mod: 26,RT

## 2021-09-25 PROCEDURE — 99283 EMERGENCY DEPT VISIT LOW MDM: CPT

## 2021-09-25 RX ORDER — CEPHALEXIN 500 MG
500 CAPSULE ORAL ONCE
Refills: 0 | Status: COMPLETED | OUTPATIENT
Start: 2021-09-25 | End: 2021-09-25

## 2021-09-25 RX ORDER — CEPHALEXIN 500 MG
1 CAPSULE ORAL
Qty: 21 | Refills: 0
Start: 2021-09-25 | End: 2021-10-01

## 2021-09-25 RX ADMIN — Medication 500 MILLIGRAM(S): at 10:16

## 2021-09-25 NOTE — ED PROVIDER NOTE - OBJECTIVE STATEMENT
31M h/o ADHD, intellectual disability, bipolar presents with staff from [facility] where pt current resides with R hand pain after punching bed frame/wall last night. Pt has h/o acting out, has had multiple prior injuries to that hand and prior surgeries after similar injuries. Pt also has healing wound to L hand from prior cutting and pt has been picking at wound. No f/c/s. Pain reports tingling to L pinky. No other injuries.

## 2021-09-25 NOTE — ED PROVIDER NOTE - PATIENT PORTAL LINK FT
You can access the FollowMyHealth Patient Portal offered by [facility] by registering at the following website: [email or web address]. By joining [employer]’s FollowMyHealth portal, you will also be able to view your health information using other applications (apps) compatible with our system.

## 2021-09-25 NOTE — ED PROVIDER NOTE - CLINICAL SUMMARY MEDICAL DECISION MAKING FREE TEXT BOX
Pt p/w R hand pain after punching wall/bed frame, r/o fracture pending XR. ALso with early cellulitis to wound on L hand, no indication for suture, wound is closed and healing. Will start on keflex.

## 2021-09-25 NOTE — ED PROVIDER NOTE - NSFOLLOWUPINSTRUCTIONS_ED_ALL_ED_FT
Take your antibiotics as prescribed. If you notice pus coming out of your wound or increased redness, pain or fever, please return to the ER immediately. You had an xray performed of your right hand and you do not have a fracture. If your pain persists after 1-2 weeks, please follow up with a hand surgeon. Take your antibiotics as prescribed. If you notice pus coming out of your wound or increased redness, pain or fever, please return to the ER immediately.

## 2021-09-25 NOTE — ED PROVIDER NOTE - NSICDXPASTMEDICALHX_GEN_ALL_CORE_FT
PAST MEDICAL HISTORY:  Asthma     Autism     Enuresis     Factitious disorder     GERD (gastroesophageal reflux disease)     Hypothyroidism     Intellectual disability     Mood disorder     Myopia of both eyes     Urinary retention

## 2021-09-25 NOTE — ED PROVIDER NOTE - PHYSICAL EXAMINATION
GEN - NAD; well appearing; A+O x3   HEAD - NC/AT   ENT: Airway patent, mmm  NECK: Neck supple  PULMONARY - Non labored breathing  CARDIAC -s1s2, RRR, no M,G,R  EXTREMITIES - moving all four extremities, swelling and erythema over R metacarpals 3-5 with TTP, able to flex/ex all IP joints and MCP joints, no scissoring of digits, reports decreased senation to L pink otherwise normal sensation throughout, good cap refill throughout. L hand with healing wound w/o bleeding or purulent discharge to L hand just lateral to thenar eminence with mild overlying erythema   NEUROLOGIC - alert, speech clear, normal gait  PSYCH -calm and cooperative, no active SI/HI

## 2021-09-25 NOTE — ED ADULT TRIAGE NOTE - CHIEF COMPLAINT QUOTE
c/o right hand pain s/p punching wall 22 times. Wound to left hand s/p cutting with knife 2 days ago. Pt calm cooperative in triage, with staff member from [facility].

## 2021-09-30 ENCOUNTER — EMERGENCY (EMERGENCY)
Facility: HOSPITAL | Age: 31
LOS: 1 days | Discharge: HOME | End: 2021-09-30
Attending: PERSONAL EMERGENCY RESPONSE ATTENDANT | Admitting: PERSONAL EMERGENCY RESPONSE ATTENDANT
Payer: MEDICAID

## 2021-09-30 VITALS
HEART RATE: 108 BPM | RESPIRATION RATE: 16 BRPM | HEIGHT: 71 IN | OXYGEN SATURATION: 99 % | TEMPERATURE: 99 F | SYSTOLIC BLOOD PRESSURE: 145 MMHG | DIASTOLIC BLOOD PRESSURE: 86 MMHG

## 2021-09-30 LAB
ALBUMIN SERPL ELPH-MCNC: 4.8 G/DL — SIGNIFICANT CHANGE UP (ref 3.3–5)
ALP SERPL-CCNC: 100 U/L — SIGNIFICANT CHANGE UP (ref 40–120)
ALT FLD-CCNC: 29 U/L — SIGNIFICANT CHANGE UP (ref 4–41)
ANION GAP SERPL CALC-SCNC: 15 MMOL/L — HIGH (ref 7–14)
AST SERPL-CCNC: 23 U/L — SIGNIFICANT CHANGE UP (ref 4–40)
BASOPHILS # BLD AUTO: 0.02 K/UL — SIGNIFICANT CHANGE UP (ref 0–0.2)
BASOPHILS NFR BLD AUTO: 0.3 % — SIGNIFICANT CHANGE UP (ref 0–2)
BILIRUB SERPL-MCNC: 0.3 MG/DL — SIGNIFICANT CHANGE UP (ref 0.2–1.2)
BUN SERPL-MCNC: 9 MG/DL — SIGNIFICANT CHANGE UP (ref 7–23)
CALCIUM SERPL-MCNC: 9.5 MG/DL — SIGNIFICANT CHANGE UP (ref 8.4–10.5)
CHLORIDE SERPL-SCNC: 105 MMOL/L — SIGNIFICANT CHANGE UP (ref 98–107)
CO2 SERPL-SCNC: 19 MMOL/L — LOW (ref 22–31)
CREAT SERPL-MCNC: 0.96 MG/DL — SIGNIFICANT CHANGE UP (ref 0.5–1.3)
EOSINOPHIL # BLD AUTO: 0.07 K/UL — SIGNIFICANT CHANGE UP (ref 0–0.5)
EOSINOPHIL NFR BLD AUTO: 1 % — SIGNIFICANT CHANGE UP (ref 0–6)
GLUCOSE SERPL-MCNC: 101 MG/DL — HIGH (ref 70–99)
HCT VFR BLD CALC: 44.2 % — SIGNIFICANT CHANGE UP (ref 39–50)
HGB BLD-MCNC: 13.8 G/DL — SIGNIFICANT CHANGE UP (ref 13–17)
IANC: 4.46 K/UL — SIGNIFICANT CHANGE UP (ref 1.5–8.5)
IMM GRANULOCYTES NFR BLD AUTO: 0.3 % — SIGNIFICANT CHANGE UP (ref 0–1.5)
LYMPHOCYTES # BLD AUTO: 2.24 K/UL — SIGNIFICANT CHANGE UP (ref 1–3.3)
LYMPHOCYTES # BLD AUTO: 30.8 % — SIGNIFICANT CHANGE UP (ref 13–44)
MCHC RBC-ENTMCNC: 23.1 PG — LOW (ref 27–34)
MCHC RBC-ENTMCNC: 31.2 GM/DL — LOW (ref 32–36)
MCV RBC AUTO: 73.9 FL — LOW (ref 80–100)
MONOCYTES # BLD AUTO: 0.47 K/UL — SIGNIFICANT CHANGE UP (ref 0–0.9)
MONOCYTES NFR BLD AUTO: 6.5 % — SIGNIFICANT CHANGE UP (ref 2–14)
NEUTROPHILS # BLD AUTO: 4.46 K/UL — SIGNIFICANT CHANGE UP (ref 1.8–7.4)
NEUTROPHILS NFR BLD AUTO: 61.1 % — SIGNIFICANT CHANGE UP (ref 43–77)
NRBC # BLD: 0 /100 WBCS — SIGNIFICANT CHANGE UP
NRBC # FLD: 0 K/UL — SIGNIFICANT CHANGE UP
PLATELET # BLD AUTO: 266 K/UL — SIGNIFICANT CHANGE UP (ref 150–400)
POTASSIUM SERPL-MCNC: 3.8 MMOL/L — SIGNIFICANT CHANGE UP (ref 3.5–5.3)
POTASSIUM SERPL-SCNC: 3.8 MMOL/L — SIGNIFICANT CHANGE UP (ref 3.5–5.3)
PROT SERPL-MCNC: 7.3 G/DL — SIGNIFICANT CHANGE UP (ref 6–8.3)
RBC # BLD: 5.98 M/UL — HIGH (ref 4.2–5.8)
RBC # FLD: 17.8 % — HIGH (ref 10.3–14.5)
SODIUM SERPL-SCNC: 139 MMOL/L — SIGNIFICANT CHANGE UP (ref 135–145)
WBC # BLD: 7.28 K/UL — SIGNIFICANT CHANGE UP (ref 3.8–10.5)
WBC # FLD AUTO: 7.28 K/UL — SIGNIFICANT CHANGE UP (ref 3.8–10.5)

## 2021-09-30 PROCEDURE — 73130 X-RAY EXAM OF HAND: CPT | Mod: 26,RT

## 2021-09-30 PROCEDURE — 99285 EMERGENCY DEPT VISIT HI MDM: CPT

## 2021-09-30 RX ORDER — KETOROLAC TROMETHAMINE 30 MG/ML
30 SYRINGE (ML) INJECTION ONCE
Refills: 0 | Status: DISCONTINUED | OUTPATIENT
Start: 2021-09-30 | End: 2021-09-30

## 2021-09-30 RX ORDER — CHLORPROMAZINE HCL 10 MG
50 TABLET ORAL ONCE
Refills: 0 | Status: COMPLETED | OUTPATIENT
Start: 2021-09-30 | End: 2021-09-30

## 2021-09-30 RX ORDER — SODIUM CHLORIDE 9 MG/ML
1000 INJECTION INTRAMUSCULAR; INTRAVENOUS; SUBCUTANEOUS ONCE
Refills: 0 | Status: COMPLETED | OUTPATIENT
Start: 2021-09-30 | End: 2021-09-30

## 2021-09-30 RX ADMIN — Medication 50 MILLIGRAM(S): at 16:58

## 2021-09-30 RX ADMIN — Medication 30 MILLIGRAM(S): at 20:35

## 2021-09-30 RX ADMIN — SODIUM CHLORIDE 1000 MILLILITER(S): 9 INJECTION INTRAMUSCULAR; INTRAVENOUS; SUBCUTANEOUS at 20:35

## 2021-09-30 RX ADMIN — SODIUM CHLORIDE 1000 MILLILITER(S): 9 INJECTION INTRAMUSCULAR; INTRAVENOUS; SUBCUTANEOUS at 21:35

## 2021-09-30 RX ADMIN — Medication 2 MILLIGRAM(S): at 16:58

## 2021-09-30 NOTE — ED ADULT TRIAGE NOTE - CHIEF COMPLAINT QUOTE
Pt arrives from [facility] for suicidal ideation and auditory hallucinations. Pt agitated at [facility], punched wall multiple times now c/o R hand pain. Also has self-inflicted wound on L hand that he is currently on antibiotics for. Calm and cooperative in triage. Denies drug or ETOH use. To be seen in . PMHx Bipolar, Autism, Asthma, Hypothyroidism

## 2021-09-30 NOTE — ED PROVIDER NOTE - CLINICAL SUMMARY MEDICAL DECISION MAKING FREE TEXT BOX
31M h/o ADHD, intellectual disability, bipolar presents with staff Kristi Jones (Direct ) from [place] [address] [facility] for agitation, R hand injury and endorsing AH and SI. Patient Patient here 5 days ago for similar.  Medical evaluation performed. There is no clinical evidence of intoxication or any acute medical problem requiring immediate intervention other than requiring XR of right hand.  No evidence of cellulitis to left hand wound.  Patient behavior expected from his baseline intellectual function and chronic psychiatric conditions. No acute psychiatric emergency, however pt is calm cooperative and denies SI and HI and has no evidence of active hallucinations.  Will do XR of hand, provide meds, and discharge.  Patient offered pain meds but declined. 31M h/o ADHD, intellectual disability, bipolar presents with staff Kristi Jones ([facility] ) from [place] [address] [facility] for agitation, R hand injury and endorsing AH and SI. Patient Patient here 5 days ago for similar.  Medical evaluation performed. There is no clinical evidence of intoxication or any acute medical problem requiring immediate intervention other than requiring XR of right hand.  No evidence of cellulitis to left hand wound.  Patient behavior expected from his baseline intellectual function and chronic psychiatric conditions. No acute psychiatric emergency, however pt is calm cooperative and denies SI and HI and has no evidence of active hallucinations.  Will do XR of hand, provide meds, and discharge.  Patient offered pain meds but declined.    Per Kristi Jones, may call residence at [phone], or supervisor [phone] and they will  patient

## 2021-09-30 NOTE — ED PROVIDER NOTE - PROGRESS NOTE DETAILS
MICHAEL Peacock (PGY-2) - Pt w/ negative CT. Bladder scan post void w/ 24ccs. Will dc, p/u per group home 2

## 2021-09-30 NOTE — ED ADULT NURSE NOTE - CHIEF COMPLAINT QUOTE
Pt arrives from [facility] for suicidal ideation and auditory hallucinations. Pt agitated at [facility], punched wall multiple times now c/o R hand pain. Also has self-inflicted wound on L hand that he is currently on antibiotics for. Calm and cooperative in triage. Denies drug or ETOH use. To be seen in . PMHx Bipolar, Autism, Asthma, Hypothyroidism

## 2021-09-30 NOTE — ED PROVIDER NOTE - OBJECTIVE STATEMENT
31M h/o ADHD, intellectual disability, bipolar presents with staff Kristi Jones ([facility] ) from [address] for agitation and endorsing AH and SI.  Patient states that he  hears 4 voices which is his baseline and that's why he was suicidal but states he is no longer hearing voices and denies SI upon assessment.  Patient states that he got upset at staff, and punched a wall with his right hand multiple times, now has pain to his right hand and states it feels "numb"  Pt has h/o acting out, has had multiple prior injuries to that hand and prior surgeries after similar injuries. Pt also has healing wound to L hand from prior cutting and pt has been picking at wound. No f/c/s. No other injuries.  Staff member Kristi states that patient was discharged out of [facility] 1 week ago, but has no safety concerns for patient.   She states that episode was triggered because patient wanted to go on a shopping spree and was told no by staff.

## 2021-09-30 NOTE — ED ADULT NURSE NOTE - OBJECTIVE STATEMENT
Pt arrives from [facility] for suicidal ideation and auditory hallucinations. Pt agitated at [facility], punched wall multiple times now c/o R hand pain. Also has self-inflicted wound on L hand that he is currently on antibiotics for. Calm and cooperative in triage. Denies drug or ETOH use. To be seen in . PMHx Bipolar, Autism, Asthma, Hypothyroidism  Patient brought to  ER for further evaluation. Patient is new to [place]. Arrived a week ago and became frustrated with the staff. Started to punch a wall (27 times) according to patient. Right hand is swollen and painful. Pt to xray and meds to be given. Waiting for dispo.   ANALI Wall

## 2021-09-30 NOTE — ED PROVIDER NOTE - ATTENDING CONTRIBUTION TO CARE
Attending MD Kathleen.  Pt is a 30 yo male who presents to ED from group home with complaint of hallucinations/SI which is baseline.  During period of agitation pt states that he punched something 30 times, XR non-actionable.  PT endorses pain to R hand s/p punching something.  No focal areas of TTP on exam.     Pt also endorses hx of kidney stones and hasn’t peed in 3 days and last time this happened he required catheterization, endorses flank pain down to penis and believes he may have another stone.  PT dosed 50 mg thorazine/2 mg Ativan shortly after arrival.  Pt txed from  to main ED after above hx for eval for possible urinary retention/renal stone eval. Attending MD Kathleen.  Pt is a 30 yo male who presents to ED from group home with complaint of hallucinations/SI which is baseline.  During period of agitation pt states that he punched something 30 times, XR non-actionable.  PT endorses pain to R hand s/p punching something.  No focal areas of TTP on exam.     Pt also endorses hx of kidney stones and hasn’t peed in 3 days and last time this happened he required catheterization, endorses flank pain down to penis and believes he may have another stone.  PT dosed 50 mg thorazine/2 mg Ativan shortly after arrival.  Pt txed from  to main ED after above hx for eval for possible urinary retention/renal stone eval.    Pt straight cath'd with 700 cc in bladder but noted to have no renal injury.  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  Pt endorsing intermittently that he has been in retention for 3 days and then that he has been in retention for 3 wks.  Plan to obtain CTAP and reassess.  Stable at time of signout to incoming team.

## 2021-09-30 NOTE — ED PROVIDER NOTE - NSFOLLOWUPINSTRUCTIONS_ED_ALL_ED_FT
You were seen in the Emergency Department for abdominal pain. Your CT scan and blood work were normal. Please follow up with your normal doctors.    1) Continue all previously prescribed medications as directed.    2) Follow up with your primary care physician - take copies of your results.    3) Return to the Emergency Department for worsening or persistent symptoms, and/or ANY NEW OR CONCERNING SYMPTOMS.

## 2021-09-30 NOTE — ED PROVIDER NOTE - PATIENT PORTAL LINK FT
You can access the FollowMyHealth Patient Portal offered by [facility] by registering at the following website: [email or web address]. By joining [employer]’s FollowMyHealth portal, you will also be able to view your health information using other applications (apps) compatible with our system.

## 2021-09-30 NOTE — ED PROVIDER NOTE - PHYSICAL EXAMINATION
GEN:   comfortable, in no apparent distress, AOx3  EYES:   PERRL, extra-occular movements intact  RESP:   clear to auscultation bilaterally, non-labored, speaking in full sentences  ABD:   soft, non tender, no guarding  :   no cva tenderness  MSK:   no musculoskeletal tenderness, 5/5 strength, moving all extremities  EXTREMITIES - moving all four extremities, swelling and erythema over R metacarpals 2-5 with TTP, able to flex/ex all IP joints and MCP joints, no scissoring of digits, reports decreased senation to hand otherwise normal sensation throughout, good cap refill throughout. L hand with healing wound w/o bleeding or purulent discharge to L hand just lateral to thenar eminence with mild overlying erythema no warmth.  NEURO:   AOx3, no focal weakness or loss of sensation, gait normal, GCS 15  PSYCH: anxious, cooperative, no evidence of hallucinations, paranoia, intoxication or withdrawal from any substances.

## 2021-10-01 VITALS
DIASTOLIC BLOOD PRESSURE: 55 MMHG | TEMPERATURE: 98 F | SYSTOLIC BLOOD PRESSURE: 110 MMHG | HEART RATE: 60 BPM | RESPIRATION RATE: 17 BRPM

## 2021-10-01 LAB
APPEARANCE UR: CLEAR — SIGNIFICANT CHANGE UP
BACTERIA # UR AUTO: ABNORMAL
BILIRUB UR-MCNC: NEGATIVE — SIGNIFICANT CHANGE UP
COLOR SPEC: YELLOW — SIGNIFICANT CHANGE UP
DIFF PNL FLD: NEGATIVE — SIGNIFICANT CHANGE UP
EPI CELLS # UR: 1 /HPF — SIGNIFICANT CHANGE UP (ref 0–5)
GLUCOSE UR QL: NEGATIVE — SIGNIFICANT CHANGE UP
HYALINE CASTS # UR AUTO: 0 /LPF — SIGNIFICANT CHANGE UP (ref 0–7)
KETONES UR-MCNC: ABNORMAL
LEUKOCYTE ESTERASE UR-ACNC: NEGATIVE — SIGNIFICANT CHANGE UP
NITRITE UR-MCNC: NEGATIVE — SIGNIFICANT CHANGE UP
PH UR: 6.5 — SIGNIFICANT CHANGE UP (ref 5–8)
PROT UR-MCNC: NEGATIVE — SIGNIFICANT CHANGE UP
RBC CASTS # UR COMP ASSIST: 1 /HPF — SIGNIFICANT CHANGE UP (ref 0–4)
SP GR SPEC: 1.01 — SIGNIFICANT CHANGE UP (ref 1–1.05)
UROBILINOGEN FLD QL: SIGNIFICANT CHANGE UP
WBC UR QL: 1 /HPF — SIGNIFICANT CHANGE UP (ref 0–5)

## 2021-10-01 PROCEDURE — 74176 CT ABD & PELVIS W/O CONTRAST: CPT | Mod: 26

## 2021-10-01 PROCEDURE — 72131 CT LUMBAR SPINE W/O DYE: CPT | Mod: 26

## 2021-10-01 RX ADMIN — Medication 30 MILLIGRAM(S): at 01:58

## 2021-10-01 NOTE — PROVIDER CONTACT NOTE (OTHER) - ASSESSMENT
As per MD Peacock, pt is medically cleared for discharge at this time. Pt arrived from [facility]. Writer contacted pt’s group home and spoke with staff member, Massiel, at [phone]. Staff informed of pt’s discharge. Mode of transportation for discharge said to be  by 2 staff members. Massiel reports with leave now for p/up at 4:30am. No concerns or questions reported. MD informed of p/up information. Verbal huddle occurred with team. Pt aware of discharge.

## 2021-10-01 NOTE — ED ADULT NURSE REASSESSMENT NOTE - NS ED NURSE REASSESS COMMENT FT1
Pt straight cath for urinary retention, output 700mL yellow clear urine, pt reports relief, ED MD Kathleen notified -- PCA Tati @ bedside with RN -- awaiting CT

## 2021-10-02 LAB
CULTURE RESULTS: NO GROWTH — SIGNIFICANT CHANGE UP
SPECIMEN SOURCE: SIGNIFICANT CHANGE UP

## 2021-10-08 NOTE — ED PROVIDER NOTE - CLINICAL SUMMARY MEDICAL DECISION MAKING FREE TEXT BOX
Patient is a 70 year old man, SNF resident, admitted 10/5/21 yesterday. He fell in the bathroom and en route to the hospital had focal motor seizures. In the ER he also had focal motor seizures and given IV ativan, lacosamide, and valproic Acid (Depacon). He has a history of seizures for which he has been on lacosamide, valproic Acid DR, and mysoline. He did not receive the morning seizure medications.He states he was in the bathroom and reached out with his right hand and fell forward striking his head. He states has right-sided HA. He complains of feeling generally unwell. Today, Friday, he states persists with right-sided HA .    PMH: As above           TBI           Seizures -3 years . Valproic acid DR-500mg tid, Lacosamide-200mg in the morning and 300mg in the evening, mysoline-50mg bid- Dr. Teddy Mcclellan           Parkinsons Disease-Sinemet 25/100 qid- Dr. Teddy Mcclellan           HTN               SH: Allergy to Keppra    Exam: Awake, slowed mentation, appropriate           Pupils 2.5mm, EOM intact, no nystagmus, VFF           CN II-XII intact           Motor tone and strength-normal, no rigidity, no tremor                                         13.2   8.18  )-----------( 367      ( 06 Oct 2021 05:30 )             40.0       10-06    135  |  101  |  8   ----------------------------<  95  4.4   |  24  |  0.67    Ca    8.9      06 Oct 2021 05:30    TPro  8.3  /  Alb  3.5  /  TBili  0.4  /  DBili  x   /  AST  28  /  ALT  29  /  AlkPhos  90  10-05    Magnesium, Serum: 2.5 mg/dL (10.07.21 @ 06:30)        Valproic Acid Level, Serum: 65 ug/mL (10.07.21 @ 14:30)    Valproic Acid Level, Serum: 39 ug/mL (10.05.21 @ 16:25)      < from: CT Head No Cont (10.07.21 @ 09:54) >    FINDINGS:    Redemonstration of right hemicraniectomy and right frontal jase holes.    Similar extensive multifocal encephalomalacia involving the bilateral frontal and right anterior temporal lobes.    No acute intracranial hemorrhage or brain edema.    Ventricles are similar in size and mild to moderately dilated. The left ventricular body and frontal horn is persistently, asymmetrically enlarged.    The visualized paranasal sinuses and mastoid air cells are clear.    IMPRESSION:    No significant interval change.  No acute intracranial hemorrhage, brain edema, or mass effect.  No displaced calvarial fracture.  Persistent mild to moderate ventricular dilatation.  Persistent asymmetric enlargement of the left ventricular body and frontal horn.                < from: CT Head No Cont (10.05.21 @ 09:35) >    COMPARISON: CT brain 5/27/2021    FINDINGS:    Redemonstration of right hemicraniectomy and right frontal jase holes.    Similar extensive multifocal encephalomalacia involving the bilateral frontal and right anterior temporal lobes.    No acute intracranial hemorrhage or brain edema.    Ventricles are similar in size and mild to moderately dilated. The left ventricular body and frontal horn is persistently, asymmetrically enlarged.    No displaced calvarial fracture.    IMPRESSION:    No acute intracranial hemorrhage, brain edema, or mass effect.  No displaced calvarial fracture.  Persistent mild to moderate ventricular dilatation.  Persistent asymmetricenlargement of the left ventricular body and frontal horn.                      Patient is a 70 year old man, SNF resident, admitted 10/5/21 yesterday. He fell in the bathroom and en route to the hospital had focal motor seizures. In the ER he also had focal motor seizures and given IV ativan, lacosamide, and valproic Acid (Depacon). He has a history of seizures for which he has been on lacosamide, valproic Acid DR, and mysoline. He did not receive the morning seizure medications.He states he was in the bathroom and reached out with his right hand and fell forward striking his head. He states has right-sided HA. He complains of feeling generally unwell. Today, Thursday, he states persists with HA.    PMH: As above           TBI           Seizures -3 years . Valproic acid DR-500mg tid, Lacosamide-200mg in the morning and 300mg in the evening, mysoline-50mg bid- Dr. Teddy Mcclellan           Parkinsons Disease-Sinemet 25/100 qid- Dr. Teddy Mcclellan           HTN               SH: Allergy to Keppra    Exam: Awake, slowed mentation, appropriate           Pupils 2.5mm, EOM intact, no nystagmus, VFF           CN II-XII intact           Motor tone and strength-normal, no rigidity, no tremor                                         13.2   8.18  )-----------( 367      ( 06 Oct 2021 05:30 )             40.0       10-06    135  |  101  |  8   ----------------------------<  95  4.4   |  24  |  0.67    Ca    8.9      06 Oct 2021 05:30    TPro  8.3  /  Alb  3.5  /  TBili  0.4  /  DBili  x   /  AST  28  /  ALT  29  /  AlkPhos  90  10-05    Magnesium, Serum: 2.5 mg/dL (10.07.21 @ 06:30)        Valproic Acid Level, Serum: 39 ug/mL (10.05.21 @ 16:25)    < from: CT Head No Cont (10.07.21 @ 09:54) >    FINDINGS:    Redemonstration of right hemicraniectomy and right frontal jase holes.    Similar extensive multifocal encephalomalacia involving the bilateral frontal and right anterior temporal lobes.    No acute intracranial hemorrhage or brain edema.    Ventricles are similar in size and mild to moderately dilated. The left ventricular body and frontal horn is persistently, asymmetrically enlarged.    The visualized paranasal sinuses and mastoid air cells are clear.    IMPRESSION:    No significant interval change.  No acute intracranial hemorrhage, brain edema, or mass effect.  No displaced calvarial fracture.  Persistent mild to moderate ventricular dilatation.  Persistent asymmetric enlargement of the left ventricular body and frontal horn.                < from: CT Head No Cont (10.05.21 @ 09:35) >    COMPARISON: CT brain 5/27/2021    FINDINGS:    Redemonstration of right hemicraniectomy and right frontal jase holes.    Similar extensive multifocal encephalomalacia involving the bilateral frontal and right anterior temporal lobes.    No acute intracranial hemorrhage or brain edema.    Ventricles are similar in size and mild to moderately dilated. The left ventricular body and frontal horn is persistently, asymmetrically enlarged.    No displaced calvarial fracture.    IMPRESSION:    No acute intracranial hemorrhage, brain edema, or mass effect.  No displaced calvarial fracture.  Persistent mild to moderate ventricular dilatation.  Persistent asymmetricenlargement of the left ventricular body and frontal horn.                    Patient is a 70y old  Male who presents with a chief complaint of Fall (06 Oct 2021 11:41)    FEels okay.  complaining of back pain.      Patient seen and examined at bedside. No overnight events reported.     ALLERGIES:  Keppra (Unknown)    MEDICATIONS  (STANDING):  ALPRAZolam 0.25 milliGRAM(s) Oral two times a day  aspirin  chewable 81 milliGRAM(s) Oral daily  carbidopa/levodopa  25/100 1 Tablet(s) Oral four times a day  diVALproex Sprinkle 500 milliGRAM(s) Oral three times a day  escitalopram 20 milliGRAM(s) Oral daily  furosemide    Tablet 40 milliGRAM(s) Oral daily  heparin   Injectable 5000 Unit(s) SubCutaneous every 8 hours  influenza   Vaccine 0.5 milliLiter(s) IntraMuscular once  lacosamide 300 milliGRAM(s) Oral <User Schedule>  lacosamide 200 milliGRAM(s) Oral <User Schedule>  levothyroxine 25 MICROGram(s) Oral daily  memantine 10 milliGRAM(s) Oral at bedtime  metoprolol tartrate 25 milliGRAM(s) Oral two times a day  primidone 50 milliGRAM(s) Oral two times a day    MEDICATIONS  (PRN):  oxycodone    5 mG/acetaminophen 325 mG 1 Tablet(s) Oral every 6 hours PRN Severe Pain (7 - 10)    Vital Signs Last 24 Hrs  T(F): 98.7 (07 Oct 2021 06:08), Max: 98.7 (07 Oct 2021 06:08)  HR: 81 (07 Oct 2021 06:08) (61 - 81)  BP: 161/84 (07 Oct 2021 06:08) (139/67 - 166/79)  RR: 16 (07 Oct 2021 06:08) (15 - 16)  SpO2: 96% (07 Oct 2021 06:08) (96% - 97%)  I&O's Summary    06 Oct 2021 07:01  -  07 Oct 2021 07:00  --------------------------------------------------------  IN: 0 mL / OUT: 1300 mL / NET: -1300 mL    PHYSICAL EXAM:  General: NAD, A/O x 2  ENT: No gross hearing impairment, Moist mucous membranes, no thrush  Neck: Supple, No JVD  Lungs: Clear to auscultation bilaterally, good air entry, non-labored breathing  Cardio: RRR, S1/S2, No murmur  Abdomen: Soft, Nontender, Nondistended; Bowel sounds present  Extremities: No calf tenderness, No cyanosis, No pitting edema  Psych: Appropriate mood and affect    LABS:                        12.6   10.77 )-----------( 339      ( 07 Oct 2021 06:30 )             39.3     10-07    138  |  99  |  17  ----------------------------<  135  4.1   |  31  |  0.95    Ca    8.6      07 Oct 2021 06:30  Mg     2.5     10-07    TPro  8.3  /  Alb  3.5  /  TBili  0.4  /  DBili  x   /  AST  28  /  ALT  29  /  AlkPhos  90  10-05    eGFR if Non African American: 80 mL/min/1.73M2 (10-07-21 @ 06:30)    CARDIAC MARKERS ( 05 Oct 2021 16:25 )  .018 ng/mL / x     / x     / x     / x        COVID-19 PCR: NotDetec (10-05-21 @ 17:15)    RADIOLOGY & ADDITIONAL TESTS:    Care Discussed with Consultants/Other Providers: Will discuss with Dr. Lira   Patient is a 70y old  Male who presents with a chief complaint of Fall (05 Oct 2021 18:21)    Feels okay.  Knows he is in hospital.   States he missed his dose of vimpat yesterday.      Patient seen and examined at bedside. No overnight events reported.     ALLERGIES:  Keppra (Unknown)    MEDICATIONS  (STANDING):  ALPRAZolam 0.25 milliGRAM(s) Oral two times a day  aspirin  chewable 81 milliGRAM(s) Oral daily  carbidopa/levodopa  25/100 1 Tablet(s) Oral four times a day  diVALproex Sprinkle 500 milliGRAM(s) Oral three times a day  escitalopram 20 milliGRAM(s) Oral daily  furosemide    Tablet 40 milliGRAM(s) Oral daily  heparin   Injectable 5000 Unit(s) SubCutaneous every 8 hours  lacosamide 300 milliGRAM(s) Oral <User Schedule>  lacosamide 200 milliGRAM(s) Oral <User Schedule>  levothyroxine 25 MICROGram(s) Oral daily  memantine 10 milliGRAM(s) Oral at bedtime  metoprolol tartrate 25 milliGRAM(s) Oral two times a day  primidone 50 milliGRAM(s) Oral two times a day    MEDICATIONS  (PRN):    Vital Signs Last 24 Hrs  T(F): 98.1 (06 Oct 2021 08:09), Max: 98.2 (05 Oct 2021 19:12)  HR: 68 (06 Oct 2021 08:09) (68 - 102)  BP: 141/74 (06 Oct 2021 08:09) (139/89 - 157/77)  RR: 12 (06 Oct 2021 08:09) (12 - 22)  SpO2: 95% (06 Oct 2021 08:09) (95% - 99%)  I&O's Summary    06 Oct 2021 07:01  -  06 Oct 2021 11:41  --------------------------------------------------------  IN: 0 mL / OUT: 1000 mL / NET: -1000 mL    PHYSICAL EXAM:  General: NAD, A/O x 2  ENT: No gross hearing impairment, Moist mucous membranes, no thrush  Neck: Supple, No JVD  Lungs: Clear to auscultation bilaterally, good air entry, non-labored breathing  Cardio: RRR, S1/S2, No murmur  Abdomen: Soft, Nontender, Nondistended; Bowel sounds present  Extremities: No calf tenderness, No cyanosis, No pitting edema  Psych: Appropriate mood and affect    LABS:                        13.2   8.18  )-----------( 367      ( 06 Oct 2021 05:30 )             40.0     10-06    135  |  101  |  8   ----------------------------<  95  4.4   |  24  |  0.67    Ca    8.9      06 Oct 2021 05:30    TPro  8.3  /  Alb  3.5  /  TBili  0.4  /  DBili  x   /  AST  28  /  ALT  29  /  AlkPhos  90  10-05    eGFR if Non African American: 97 mL/min/1.73M2 (10-06-21 @ 05:30)    CARDIAC MARKERS ( 05 Oct 2021 16:25 )  .018 ng/mL / x     / x     / x     / x        COVID-19 PCR: NotDetec (10-05-21 @ 17:15)    RADIOLOGY & ADDITIONAL TESTS:    Care Discussed with Consultants/Other Providers: Discussed with outpatient Neurologist Dr. Teddy Mcclellan who agrees with management     Patient is a 70y old  Male who presents with a chief complaint of Fall (07 Oct 2021 09:20)    alert, conversant, mildly confused at times but follows commands, sleepy at times.      Patient seen and examined at bedside. No overnight events reported.     ALLERGIES:  Keppra (Unknown)    MEDICATIONS  (STANDING):  ALPRAZolam 0.25 milliGRAM(s) Oral two times a day  aspirin  chewable 81 milliGRAM(s) Oral daily  carbidopa/levodopa  25/100 1 Tablet(s) Oral four times a day  diVALproex Sprinkle 500 milliGRAM(s) Oral three times a day  escitalopram 20 milliGRAM(s) Oral daily  furosemide    Tablet 40 milliGRAM(s) Oral daily  heparin   Injectable 5000 Unit(s) SubCutaneous every 8 hours  influenza   Vaccine 0.5 milliLiter(s) IntraMuscular once  lacosamide 300 milliGRAM(s) Oral <User Schedule>  lacosamide 200 milliGRAM(s) Oral <User Schedule>  levothyroxine 25 MICROGram(s) Oral daily  memantine 10 milliGRAM(s) Oral at bedtime  metoprolol tartrate 25 milliGRAM(s) Oral two times a day  primidone 50 milliGRAM(s) Oral two times a day    MEDICATIONS  (PRN):  oxycodone    5 mG/acetaminophen 325 mG 1 Tablet(s) Oral every 6 hours PRN Severe Pain (7 - 10)    Vital Signs Last 24 Hrs  T(F): 98.1 (08 Oct 2021 05:06), Max: 98.5 (07 Oct 2021 20:09)  HR: 77 (08 Oct 2021 05:06) (69 - 88)  BP: 158/87 (08 Oct 2021 05:06) (133/70 - 158/87)  RR: 16 (08 Oct 2021 05:06) (16 - 17)  SpO2: 94% (08 Oct 2021 05:06) (93% - 97%)  I&O's Summary    07 Oct 2021 07:01  -  08 Oct 2021 07:00  --------------------------------------------------------  IN: 0 mL / OUT: 600 mL / NET: -600 mL      PHYSICAL EXAM:  General: NAD, A/O x 2, knows he is in hospital, appears tired   ENT: No gross hearing impairment, Moist mucous membranes, no thrush  Neck: Supple, No JVD  Lungs: Clear to auscultation bilaterally, good air entry, non-labored breathing  Cardio: RRR, S1/S2, No murmur  Abdomen: Soft, Nontender, Nondistended; Bowel sounds present  Extremities: No calf tenderness, No cyanosis, No pitting edema  Psych: Appropriate mood and affect    LABS:                        12.6   10.77 )-----------( 339      ( 07 Oct 2021 06:30 )             39.3     10-07    138  |  99  |  17  ----------------------------<  135  4.1   |  31  |  0.95    Ca    8.6      07 Oct 2021 06:30  Mg     2.5     10-07    TPro  8.3  /  Alb  3.5  /  TBili  0.4  /  DBili  x   /  AST  28  /  ALT  29  /  AlkPhos  90  10-05    eGFR if Non African American: 80 mL/min/1.73M2 (10-07-21 @ 06:30)    CARDIAC MARKERS ( 05 Oct 2021 16:25 )  .018 ng/mL / x     / x     / x     / x        COVID-19 PCR: NotDetec (10-05-21 @ 17:15)    RADIOLOGY & ADDITIONAL TESTS:    Care Discussed with Consultants/Other Providers:    Will check urine, obtain bladder scan, and give Zithromax, Rocephin, and fleet enema.

## 2021-10-13 ENCOUNTER — EMERGENCY (EMERGENCY)
Facility: HOSPITAL | Age: 31
LOS: 1 days | Discharge: HOME | End: 2021-10-13
Attending: EMERGENCY MEDICINE | Admitting: STUDENT IN AN ORGANIZED HEALTH CARE EDUCATION/TRAINING PROGRAM
Payer: MEDICAID

## 2021-10-13 VITALS
OXYGEN SATURATION: 100 % | DIASTOLIC BLOOD PRESSURE: 79 MMHG | TEMPERATURE: 98 F | HEART RATE: 83 BPM | RESPIRATION RATE: 18 BRPM | SYSTOLIC BLOOD PRESSURE: 120 MMHG

## 2021-10-13 VITALS
OXYGEN SATURATION: 100 % | RESPIRATION RATE: 16 BRPM | TEMPERATURE: 99 F | HEIGHT: 71 IN | SYSTOLIC BLOOD PRESSURE: 140 MMHG | DIASTOLIC BLOOD PRESSURE: 71 MMHG | HEART RATE: 100 BPM

## 2021-10-13 LAB
ALBUMIN SERPL ELPH-MCNC: 4.9 G/DL — SIGNIFICANT CHANGE UP (ref 3.3–5)
ALP SERPL-CCNC: 104 U/L — SIGNIFICANT CHANGE UP (ref 40–120)
ALT FLD-CCNC: 49 U/L — HIGH (ref 4–41)
ANION GAP SERPL CALC-SCNC: 14 MMOL/L — SIGNIFICANT CHANGE UP (ref 7–14)
APPEARANCE UR: ABNORMAL
AST SERPL-CCNC: 33 U/L — SIGNIFICANT CHANGE UP (ref 4–40)
BACTERIA # UR AUTO: ABNORMAL
BASOPHILS # BLD AUTO: 0.02 K/UL — SIGNIFICANT CHANGE UP (ref 0–0.2)
BASOPHILS NFR BLD AUTO: 0.2 % — SIGNIFICANT CHANGE UP (ref 0–2)
BILIRUB SERPL-MCNC: 0.4 MG/DL — SIGNIFICANT CHANGE UP (ref 0.2–1.2)
BILIRUB UR-MCNC: NEGATIVE — SIGNIFICANT CHANGE UP
BUN SERPL-MCNC: 11 MG/DL — SIGNIFICANT CHANGE UP (ref 7–23)
CALCIUM SERPL-MCNC: 9.4 MG/DL — SIGNIFICANT CHANGE UP (ref 8.4–10.5)
CHLORIDE SERPL-SCNC: 102 MMOL/L — SIGNIFICANT CHANGE UP (ref 98–107)
CO2 SERPL-SCNC: 22 MMOL/L — SIGNIFICANT CHANGE UP (ref 22–31)
COLOR SPEC: YELLOW — SIGNIFICANT CHANGE UP
CREAT SERPL-MCNC: 0.93 MG/DL — SIGNIFICANT CHANGE UP (ref 0.5–1.3)
DIFF PNL FLD: NEGATIVE — SIGNIFICANT CHANGE UP
EOSINOPHIL # BLD AUTO: 0.06 K/UL — SIGNIFICANT CHANGE UP (ref 0–0.5)
EOSINOPHIL NFR BLD AUTO: 0.6 % — SIGNIFICANT CHANGE UP (ref 0–6)
EPI CELLS # UR: SIGNIFICANT CHANGE UP /HPF (ref 0–5)
GLUCOSE SERPL-MCNC: 87 MG/DL — SIGNIFICANT CHANGE UP (ref 70–99)
GLUCOSE UR QL: NEGATIVE — SIGNIFICANT CHANGE UP
HCT VFR BLD CALC: 46.8 % — SIGNIFICANT CHANGE UP (ref 39–50)
HGB BLD-MCNC: 14.5 G/DL — SIGNIFICANT CHANGE UP (ref 13–17)
HYALINE CASTS # UR AUTO: 2 /LPF — SIGNIFICANT CHANGE UP (ref 0–7)
IANC: 6.63 K/UL — SIGNIFICANT CHANGE UP (ref 1.5–8.5)
IMM GRANULOCYTES NFR BLD AUTO: 0.3 % — SIGNIFICANT CHANGE UP (ref 0–1.5)
KETONES UR-MCNC: ABNORMAL
LEUKOCYTE ESTERASE UR-ACNC: NEGATIVE — SIGNIFICANT CHANGE UP
LYMPHOCYTES # BLD AUTO: 2.12 K/UL — SIGNIFICANT CHANGE UP (ref 1–3.3)
LYMPHOCYTES # BLD AUTO: 22 % — SIGNIFICANT CHANGE UP (ref 13–44)
MCHC RBC-ENTMCNC: 22.4 PG — LOW (ref 27–34)
MCHC RBC-ENTMCNC: 31 GM/DL — LOW (ref 32–36)
MCV RBC AUTO: 72.3 FL — LOW (ref 80–100)
MONOCYTES # BLD AUTO: 0.78 K/UL — SIGNIFICANT CHANGE UP (ref 0–0.9)
MONOCYTES NFR BLD AUTO: 8.1 % — SIGNIFICANT CHANGE UP (ref 2–14)
NEUTROPHILS # BLD AUTO: 6.63 K/UL — SIGNIFICANT CHANGE UP (ref 1.8–7.4)
NEUTROPHILS NFR BLD AUTO: 68.8 % — SIGNIFICANT CHANGE UP (ref 43–77)
NITRITE UR-MCNC: NEGATIVE — SIGNIFICANT CHANGE UP
NRBC # BLD: 0 /100 WBCS — SIGNIFICANT CHANGE UP
NRBC # FLD: 0 K/UL — SIGNIFICANT CHANGE UP
PH UR: 7 — SIGNIFICANT CHANGE UP (ref 5–8)
PLATELET # BLD AUTO: 274 K/UL — SIGNIFICANT CHANGE UP (ref 150–400)
POTASSIUM SERPL-MCNC: 4 MMOL/L — SIGNIFICANT CHANGE UP (ref 3.5–5.3)
POTASSIUM SERPL-SCNC: 4 MMOL/L — SIGNIFICANT CHANGE UP (ref 3.5–5.3)
PROT SERPL-MCNC: 7.1 G/DL — SIGNIFICANT CHANGE UP (ref 6–8.3)
PROT UR-MCNC: ABNORMAL
RBC # BLD: 6.47 M/UL — HIGH (ref 4.2–5.8)
RBC # FLD: 18.4 % — HIGH (ref 10.3–14.5)
RBC CASTS # UR COMP ASSIST: 1 /HPF — SIGNIFICANT CHANGE UP (ref 0–4)
SODIUM SERPL-SCNC: 138 MMOL/L — SIGNIFICANT CHANGE UP (ref 135–145)
SP GR SPEC: 1.01 — SIGNIFICANT CHANGE UP (ref 1–1.05)
UROBILINOGEN FLD QL: ABNORMAL
WBC # BLD: 9.64 K/UL — SIGNIFICANT CHANGE UP (ref 3.8–10.5)
WBC # FLD AUTO: 9.64 K/UL — SIGNIFICANT CHANGE UP (ref 3.8–10.5)
WBC UR QL: 1 /HPF — SIGNIFICANT CHANGE UP (ref 0–5)

## 2021-10-13 PROCEDURE — 99285 EMERGENCY DEPT VISIT HI MDM: CPT

## 2021-10-13 PROCEDURE — 70450 CT HEAD/BRAIN W/O DYE: CPT | Mod: 26

## 2021-10-13 PROCEDURE — 90792 PSYCH DIAG EVAL W/MED SRVCS: CPT

## 2021-10-13 RX ORDER — SODIUM CHLORIDE 9 MG/ML
2000 INJECTION INTRAMUSCULAR; INTRAVENOUS; SUBCUTANEOUS ONCE
Refills: 0 | Status: COMPLETED | OUTPATIENT
Start: 2021-10-13 | End: 2021-10-13

## 2021-10-13 RX ADMIN — SODIUM CHLORIDE 2000 MILLILITER(S): 9 INJECTION INTRAMUSCULAR; INTRAVENOUS; SUBCUTANEOUS at 14:57

## 2021-10-13 NOTE — ED PROVIDER NOTE - CRANIAL NERVE AND PUPILLARY EXAM
cranial nerves 2-12 intact/cough reflex intact/corneal reflex intact/central and peripheral vision intact

## 2021-10-13 NOTE — ED PROVIDER NOTE - PROGRESS NOTE DETAILS
Jones: patient signed out to me pending labs and ua results, head ct negative for acute intracranial injury

## 2021-10-13 NOTE — ED PROVIDER NOTE - PATIENT PORTAL LINK FT
You can access the FollowMyHealth Patient Portal offered by [facility] by registering at the following website: [email or web address]. By joining [facility]’s FollowMyHealth portal, you will also be able to view your health information using other applications (apps) compatible with our system.

## 2021-10-13 NOTE — PROVIDER CONTACT NOTE (OTHER) - ASSESSMENT
SW met with pt and pt [facility] Tran quinonez, at bedside.  Pt expressed that he did not want to return to [facility] and stated that he will cut himself if he is forced to go home.  SW reported this to PA, PA stated that he will has pt evaluated by psych.  tran Quinonez, stated that there is transportation provided by in patient unit back to facility.  SW to remain involved if needed.

## 2021-10-13 NOTE — ED BEHAVIORAL HEALTH NOTE - BEHAVIORAL HEALTH NOTE
Worker called medical director of Daniel guerrero – [phone] for collateral information. All information is as  per Dr. Alexander:    Dr. Guerrero states that [facility] She states that [facility] is facilitated by [facility]. She states that the ETU program is an intermittent program that clients reside up to 6 months. She states that the patient is connected to Psychiatrist dr. marielena hamilton and met with him last week. She states that the patient engages in sib of head banging and today was sent to the hospital because the patient needed to be evaluated for engaging in these behaviors and he was complaining of neck pain. She states that the patient keeps saying that he wants to see an emergency room psychiatrist rather than seeing his own psychiatrist. She states that his behaviors require a 1:1 supervision. She states that the patient keeps opening an open wound on his hand and keeps reopening this wound. She states that the patient was prescribed antibiotics at [facility] last week for this. She states that the patient engages in SIB and a part of this behavior is that it can be manipulative when he wants something. Case discussed with psychiatry. Patient is cleared for discharge. Per provider, SHAMAR Patel patient is cleared and is able to return to their previous residence, [place] [id number].  has spoken to Dr. Guerrero and  supervisor Randi ([phone],  confirmed that patients mode of transportation is  by staff and that patient travels WITH SUPERVISION of staff member- patient has a 1:1 by bedside. Clinical provider is in agreement with  by staff member back to group home. Verbal huddle regarding coordination of care completed with interdisciplinary team. Staff member will call when outside. eta 5:45pm

## 2021-10-13 NOTE — ED PROVIDER NOTE - NSFOLLOWUPINSTRUCTIONS_ED_ALL_ED_FT
Rest, drink plenty of fluids.  Advance activity as tolerated.  Continue all previously prescribed medications as directed.  Follow up with your primary care physician in 48-72 hours- bring copies of your results.  Return to the ER for worsening or persistent symptoms, and/or ANY NEW OR CONCERNING SYMPTOMS. If you have issues obtaining follow up, please call: [phone] to obtain a doctor or specialist who takes your insurance in your area.  You may call [phone] to make an appointment with the internal medicine clinic.    Please follow up with the urologist.     Please go to [facility]

## 2021-10-13 NOTE — ED PROVIDER NOTE - ATTENDING CONTRIBUTION TO CARE
Attending note:   After face to face evaluation of this patient, I concur with above noted hx, pe, and care plan for this patient.  Grimes: 31 yom from [facility] sent in with staff for hitting his own head against wall multiple times. No LOC. Pt also states he is in urinary retention and has not peed in weeks and gets cathed. Staff denies that. Pt is well appearing, no distress, right side scalp hematoma, skin shows multiple abrasions from picking it. rest of exam unremarkable as noted above.  Labs, CT head, post void US of bladder after 2L to ensure bladder emptying and hydration.

## 2021-10-13 NOTE — ED BEHAVIORAL HEALTH ASSESSMENT NOTE - SAFETY PLAN ADDT'L DETAILS
Safety plan discussed with.../Education provided regarding environmental safety / lethal means restriction/Provision of National Suicide Prevention Lifeline 9-478-229-XNCF (8910)

## 2021-10-13 NOTE — ED PROVIDER NOTE - OBJECTIVE STATEMENT
31 y.o male with a PMhx of ADHD, intellectual disability, bipolar from [facility] 8 presented to the ED complaining of head trauma, he states that he was hitting his head on the wall multiple times this morning without having any LOC, he states that he was upset with the staff. He states that he now has a bruise on the right side of the head which hurt. Pt states that he also hasn't urinated in weeks, he states that he has to get a catheter. As per his aide he has never needed a urinary catheter. He denies having any CP, SOB, TRINH, headaches, dizziness, nausea, vomiting, dairrhea, abdominal pain, fever, chills, he denies having any SI, HI, AH, VH

## 2021-10-13 NOTE — ED BEHAVIORAL HEALTH ASSESSMENT NOTE - DESCRIPTION
remained in behavioral control throughout ED course; he did not require prn meds or restraints   Vital Signs Last 24 Hrs  T(C): 36.4 (13 Oct 2021 16:15), Max: 37.1 (13 Oct 2021 11:29)  T(F): 97.6 (13 Oct 2021 16:15), Max: 98.8 (13 Oct 2021 11:29)  HR: 83 (13 Oct 2021 16:15) (83 - 100)  BP: 120/79 (13 Oct 2021 16:15) (120/79 - 140/71)  BP(mean): --  RR: 18 (13 Oct 2021 16:15) (16 - 18)  SpO2: 100% (13 Oct 2021 16:15) (100% - 100%) see HPI

## 2021-10-13 NOTE — ED BEHAVIORAL HEALTH ASSESSMENT NOTE - OTHER
group home staff impaired by history; intact during interview chronically impaired group home superficially cooperative, perseverates on wanting psych admission "I want to be admitted." concrete with his staff

## 2021-10-13 NOTE — ED BEHAVIORAL HEALTH ASSESSMENT NOTE - RISK ASSESSMENT
Low Acute Suicide Risk Pt is at chronically elevated risk of harm to self/others given h/o self-harm, h/o aggression, poor impulse control and poor frustration tolerance due to intellectual disability,   protective factors include being domiciled, no hx SA, on 1:1 supervision at group home, no access to guns/weapons, denies insomnia, future-oriented, help-seeking.   Pt is not at acutely elevated risk of harm to self or others.

## 2021-10-13 NOTE — ED BEHAVIORAL HEALTH ASSESSMENT NOTE - HPI (INCLUDE ILLNESS QUALITY, SEVERITY, DURATION, TIMING, CONTEXT, MODIFYING FACTORS, ASSOCIATED SIGNS AND SYMPTOMS)
31-year-old male; domiciled at group home; PPHx of intellectual disability, mood disorder, factitious disorder, past admissions, no known hx SA, +hx NSSIB (punching wall, banging head against wall), in context of being upset/angry about not getting what he wants, +h/o making suicidal statements when upset/angry about not getting what he wants, no known legal hx, +h/o aggression, BIB EMS activated by group home staff s/p banging his head against a wall in the context of being upset with staff.     On interview, pt states "I want to be admitted. Am I going to psych?" When asked why he wants to be admitted, pt states he doesn't want to return to his group home. States he doesn't like the group home/staff there. When asked why he doesn't like his group home, pt repeats "I want to be admitted, I'm suicidal." Pt denies suicidal plan. Pt states he wouldn't feel suicidal/wouldn't need psych admission if he lived somewhere else. States he banged his head on the wall today because "I got upset." When asked what made him upset, he says "I don't like my home." Patient endorses good sleep and appetite. Endorses occasional alcohol use. Denies other substance use. Denies AH/VH/psychotic symptoms. Denies HI/thoughts of harming others. Denies manic symptoms, denies persistently depressed mood or anhedonia.   See  note for collateral.

## 2021-10-13 NOTE — ED PROVIDER NOTE - CLINICAL SUMMARY MEDICAL DECISION MAKING FREE TEXT BOX
31 y.o male with a PMhx of ADHD, intellectual disability, bipolar from [facility] presented to the ED complaining of head trauma, he states that he was hitting his head on the wall multiple times this morning without having any LOC. Head trauma-ct head, labs, fluids, reassess. Patient was found to have 300ccs of urine.

## 2021-10-13 NOTE — ED BEHAVIORAL HEALTH ASSESSMENT NOTE - SUMMARY
31-year-old male; domiciled at group home; PPHx of intellectual disability, mood disorder, factitious disorder, past admissions, no known hx SA, +hx NSSIB (punching wall, banging head against wall), in context of being upset/angry about not getting what he wants, +h/o making suicidal statements when upset/angry about not getting what he wants, no known legal hx, +h/o aggression, BIB EMS activated by group home staff s/p banging his head against a wall in the context of being upset with staff.     Pt presenting with conditional SI--states he would not feel suicidal if he lived somewhere else. He clearly states he doesn't like his housing and wants to live somewhere else. He repeatedly requests to be admitted to psych to get a break from his group home. Risk further mitigated by patient being under 24 hours supervision at group home. Pt does not appear psychotic, manic, or depressed at this time. Pt does not present an acute danger to self or others and would not benefit from inpatient psychiatric admission at this time.

## 2021-10-13 NOTE — ED BEHAVIORAL HEALTH ASSESSMENT NOTE - DETAILS
see HPI call 911 or return to ED if symptoms worsen or if pt develops thoughts of harming self or others informed group home zyprexa - rash

## 2021-10-13 NOTE — ED BEHAVIORAL HEALTH ASSESSMENT NOTE - NSACTIVEVENT_PSY_ALL_CORE
dislikes group home/Triggering events leading to humiliation, shame, and/or despair (e.g., Loss of relationship, financial or health status) (real or anticipated)

## 2021-10-19 ENCOUNTER — EMERGENCY (EMERGENCY)
Facility: HOSPITAL | Age: 31
LOS: 1 days | Discharge: HOME | End: 2021-10-19
Attending: EMERGENCY MEDICINE | Admitting: EMERGENCY MEDICINE
Payer: MEDICAID

## 2021-10-19 VITALS
SYSTOLIC BLOOD PRESSURE: 136 MMHG | HEIGHT: 71 IN | HEART RATE: 87 BPM | OXYGEN SATURATION: 100 % | TEMPERATURE: 97 F | RESPIRATION RATE: 20 BRPM | DIASTOLIC BLOOD PRESSURE: 86 MMHG

## 2021-10-19 PROCEDURE — 90792 PSYCH DIAG EVAL W/MED SRVCS: CPT

## 2021-10-19 PROCEDURE — 73130 X-RAY EXAM OF HAND: CPT | Mod: 26,RT

## 2021-10-19 PROCEDURE — 99284 EMERGENCY DEPT VISIT MOD MDM: CPT

## 2021-10-19 PROCEDURE — 73090 X-RAY EXAM OF FOREARM: CPT | Mod: 26,LT

## 2021-10-19 RX ORDER — CHLORPROMAZINE HCL 10 MG
50 TABLET ORAL ONCE
Refills: 0 | Status: COMPLETED | OUTPATIENT
Start: 2021-10-19 | End: 2021-10-19

## 2021-10-19 RX ADMIN — Medication 100 MILLIGRAM(S): at 16:08

## 2021-10-19 RX ADMIN — Medication 50 MILLIGRAM(S): at 18:53

## 2021-10-19 NOTE — ED PROVIDER NOTE - PROGRESS NOTE DETAILS
SAUNDRA Lake- pt anxious still reports he does not like his group home, he does not want to go back, provider explained at this time he does not meet the requirement for inpatient psych hospitalization, offered po med for anxiety, pt requesting im injection.

## 2021-10-19 NOTE — ED BEHAVIORAL HEALTH ASSESSMENT NOTE - OTHER
group home staff chronically impaired concrete with his staff group home superficially cooperative, perseverates on wanting psych admission impaired by history; intact during interview "I want to be admitted."

## 2021-10-19 NOTE — ED BEHAVIORAL HEALTH ASSESSMENT NOTE - DETAILS
zyprexa - rash informed group home see HPI call 911 or return to ED if symptoms worsen or if pt develops thoughts of harming self or others

## 2021-10-19 NOTE — ED BEHAVIORAL HEALTH ASSESSMENT NOTE - HPI (INCLUDE ILLNESS QUALITY, SEVERITY, DURATION, TIMING, CONTEXT, MODIFYING FACTORS, ASSOCIATED SIGNS AND SYMPTOMS)
31-year-old male; domiciled at group home; PPHx of intellectual disability, mood disorder, factitious disorder, past admissions, no known hx SA, +hx NSSIB (punching wall, banging head against wall), in context of being upset/angry about not getting what he wants, +h/o making suicidal statements when upset/angry about not getting what he wants, no known legal hx, +h/o aggression, brought in by group home staff s/p punching the wall and biting his arm in the context of being upset with staff/disliking his residence.     On interview, pt states he punched the wall and bit his arm "to hurt myself" because "I want to go to psych." Pt states he doesn't want to return to his group home. States he doesn't like the group home/staff there. When asked why he doesn't like his group home, pt repeats "am I going to psych? I'm suicidal." Pt denies suicidal plan. Pt states he wouldn't feel suicidal/wouldn't need psych admission if he lived somewhere else. Patient endorses good sleep and appetite. Endorses occasional alcohol use. Denies other substance use. Denies AH/VH/psychotic symptoms. Denies HI/thoughts of harming others. Denies manic symptoms, denies persistently depressed mood or anhedonia.   See  note for collateral.

## 2021-10-19 NOTE — ED PROVIDER NOTE - PATIENT PORTAL LINK FT
lab results/need for outpatient follow-up/radiology results/return to ED if symptoms worsen, persist or questions arise You can access the FollowMyHealth Patient Portal offered by [facility] by registering at the following website: [email or web address]. By joining [employer]’s FollowMyHealth portal, you will also be able to view your health information using other applications (apps) compatible with our system.

## 2021-10-19 NOTE — ED PROVIDER NOTE - OBJECTIVE STATEMENT
This is a 31 yr old M, pmh autism with c/o acting out behaviour. Pt arrived with staff member from group home, after repeatedly punching a wall and biting self.

## 2021-10-19 NOTE — ED ADULT TRIAGE NOTE - CHIEF COMPLAINT QUOTE
Pt from facility accompanied by staff. Pt punched wall and self cuts and has self injurious behavior. Pt with infected right arm from cutting and biting himself.  Pt states that he is depressed and his medication not working.

## 2021-10-19 NOTE — ED ADULT NURSE REASSESSMENT NOTE - NS ED NURSE REASSESS COMMENT FT1
Pt cleared for d/c by NP. Pt calm and cooperative, d/c to group home with staff 1:1. Cab arranged by KEKE. Mary Ellen completed.

## 2021-10-19 NOTE — ED PROVIDER NOTE - NS ED ROS FT
+ right hand swelling and pain  + left forearm wound + right hand swelling and pain  + left forearm wound  + acting out behaviour

## 2021-10-19 NOTE — ED ADULT NURSE REASSESSMENT NOTE - NS ED NURSE REASSESS COMMENT FT1
Pt arrived to  from intake with 1:1 staff. Report received from ANALI Fields. Pt is pending xray. Pt calm and cooperative.

## 2021-10-19 NOTE — ED PROVIDER NOTE - CLINICAL SUMMARY MEDICAL DECISION MAKING FREE TEXT BOX
SAUNDRA Lake- xray - wnl no dislocation no fracture, left forearm with wound infection abx ordered and script sent to pharmacy   Psych consult - out patient tx This is a 31 yr old M, pmh autism with c/o acting out behaviour. Pt arrived with staff member from group home, after repeatedly punching a wall and biting self.  NP Bereczky- xray - wnl no dislocation no fracture, left forearm with wound infection abx ordered and script sent to pharmacy   Psych consult - out patient tx

## 2021-10-19 NOTE — ED BEHAVIORAL HEALTH ASSESSMENT NOTE - SAFETY PLAN ADDT'L DETAILS
Safety plan discussed with.../Education provided regarding environmental safety / lethal means restriction/Provision of National Suicide Prevention Lifeline 3-406-262-IYAD (5653)

## 2021-10-19 NOTE — ED BEHAVIORAL HEALTH ASSESSMENT NOTE - SUMMARY
31-year-old male; domiciled at group home; PPHx of intellectual disability, mood disorder, factitious disorder, past admissions, no known hx SA, +hx NSSIB (punching wall, banging head against wall), in context of being upset/angry about not getting what he wants, +h/o making suicidal statements when upset/angry about not getting what he wants, no known legal hx, +h/o aggression, brought in by group home staff s/p punching the wall and biting his arm in the context of being upset with staff/disliking his residence.   Pt presenting with conditional SI--states he would not feel suicidal if he lived somewhere else. He clearly states he doesn't like his housing and wants to live somewhere else. He repeatedly requests to be admitted to psych to get a break from his group home. Risk further mitigated by patient being under 24 hours supervision at group home. Pt does not appear psychotic, manic, or depressed at this time. Pt does not present an acute danger to self or others and would not benefit from inpatient psychiatric admission at this time.

## 2021-10-19 NOTE — ED PROVIDER NOTE - NSFOLLOWUPINSTRUCTIONS_ED_ALL_ED_FT
Wound Infection    WHAT YOU NEED TO KNOW:    A wound infection occurs when bacteria enters a break in the skin. The infection may involve just the skin, or affect deeper tissues or organs close to the wound.     DISCHARGE INSTRUCTIONS:    Return to the emergency department if:   •You feel short of breath.       •Your heart is beating faster than usual.       •You feel confused.       •Blood soaks through your bandages.      •Your wound comes apart or feels like it is ripping.       •You have severe pain.      •You see red streaks coming from the infected area.      Contact your healthcare provider if:   •You have a fever or chills.       •You have more pain, redness, or swelling near your wound.      •Your symptoms do not improve.       •The skin around your wound feels numb.      •You have questions or concerns about your condition or care.      Medicines: You may need any of the following:   •NSAIDs, such as ibuprofen, help decrease swelling, pain, and fever. This medicine is available with or without a doctor's order. NSAIDs can cause stomach bleeding or kidney problems in certain people. If you take blood thinner medicine, always ask your healthcare provider if NSAIDs are safe for you. Always read the medicine label and follow directions.      •Antibiotics help treat a bacterial infection.       •Take your medicine as directed. Contact your healthcare provider if you think your medicine is not helping or if you have side effects. Tell him or her if you are allergic to any medicine. Keep a list of the medicines, vitamins, and herbs you take. Include the amounts, and when and why you take them. Bring the list or the pill bottles to follow-up visits. Carry your medicine list with you in case of an emergency.      Care for your wound as directed: Keep your wound clean and dry. You may need to cover your wound when you bathe so it does not get wet. Clean your wound as directed with soap and water or wound . Put on new, clean bandages as directed. Change your bandages when they get wet or dirty.    Help your wound heal:   •Eat a variety of healthy foods. Examples include fruits, vegetables, whole-grain breads, low-fat dairy products, beans, lean meats, and fish. Healthy foods may help you heal faster. You may also need to take vitamins and minerals. Ask if you need to be on a special diet.      •Manage other health conditions. Follow your healthcare provider's directions to manage health conditions that can cause slow wound healing. Examples include high blood pressure and diabetes.      •Do not smoke. Nicotine and other chemicals in cigarettes and cigars can cause slow wound healing. Ask your healthcare provider for information if you currently smoke and need help to quit. E-cigarettes or smokeless tobacco still contain nicotine. Talk to your healthcare provider before you use these products.       Follow up with your healthcare provider in 1 to 2 days: Write down your questions so you remember to ask them during your visits.
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
## 2021-10-19 NOTE — ED BEHAVIORAL HEALTH NOTE - BEHAVIORAL HEALTH NOTE
Per provider, NP Theresa Ying, patient is cleared and is able to return to their previous residence, [facility].  has spoken to Randi Birch at [facility] AND ([phone])  confirmed that patients mode of transportation is [facility] staff vehicle and that patient travels WITH SUPERVISION. Clinical provider is in agreement with [facility] staff to bring pt back to group home. Verbal huddle regarding coordination of care completed with interdisciplinary team.     Transportation coordinated via STANDARD TRANSPORTATION DOCUMENTATION. Writer spoke with [facility] staff Randi Birch to confirm transportation. Pt will travel with his staff member here with him currently and another staff will come to pick them up. ETA 15-20 minutes.    Per provider, NP Theresa Ying, patient is cleared and is able to return to their previous residence, [facility].  has spoken to Randi Birch at [facility] AND ([phone])  confirmed that patients mode of transportation is [facility] staff vehicle and that patient travels WITH SUPERVISION. Clinical provider is in agreement with [facility] staff to bring pt back to group home. Verbal huddle regarding coordination of care completed with interdisciplinary team.     Transportation coordinated via STANDARD TRANSPORTATION DOCUMENTATION. Writer spoke with [facility] staff Randi Birch to confirm transportation. Pt will travel with his staff member here with him currently and another staff will come to pick them up. ETA 15-20 minutes.    Per provider, NP Theresa Ying, patient is cleared and is able to return to their previous residence, [facility].  has spoken to Randi Birch at [facility] AND ([phone])  confirmed that patients mode of transportation is [facility] staff vehicle and that patient travels WITH SUPERVISION(staff member Vic). Clinical provider is in agreement with [facility] staff to bring pt back to group home. Verbal huddle regarding coordination of care completed with interdisciplinary team.     Transportation coordinated via STANDARD TRANSPORTATION DOCUMENTATION.

## 2021-10-19 NOTE — ED BEHAVIORAL HEALTH ASSESSMENT NOTE - RISK ASSESSMENT
Pt is at chronically elevated risk of harm to self/others given h/o self-harm, h/o aggression, poor impulse control and poor frustration tolerance due to intellectual disability,   protective factors include being domiciled, no hx SA, on 1:1 supervision at group home, no access to guns/weapons, denies insomnia, future-oriented, help-seeking.   Pt is not at acutely elevated risk of harm to self or others. Low Acute Suicide Risk

## 2021-10-19 NOTE — ED ADULT NURSE NOTE - OBJECTIVE STATEMENT
Pt arrived to  from intake. Pt is on 1:1 with group [facility] staff, endorses urges to harm self, denies H/I A/H V/H. Pt wanded for safety, changed into  clothing.

## 2021-10-20 ENCOUNTER — RESULT REVIEW (OUTPATIENT)
Age: 31
End: 2021-10-20

## 2021-10-21 NOTE — ED ADULT TRIAGE NOTE - NS_BH TRG QUESTION3_ED_ALL_ED
Initial Anesthesia Post-op Note    Patient: Carroll Gonzalez  Procedure(s) Performed: LEFT TOTAL HIP ARTHROPLASTY - LEFT  Anesthesia type: General    Vitals Value Taken Time   Temp 36.6 10/21/21 1045   Pulse 74 10/21/21 1045   Resp 20 10/21/21 1045   SpO2 97 10/21/21 1045   /68 10/21/21 1045         Patient Location: PACU Phase 1  Post-op Vital Signs:stable  Level of Consciousness: participates in exam, answers questions appropriately, awake, alert and oriented  Respiratory Status: spontaneous ventilation and unassisted  Cardiovascular blood pressure returned to baseline and stable  Hydration: euvolemic  Pain Management: well controlled  Handoff: Handoff to receiving nurse was performed and questions were answered  Nausea: None  Airway Patency:patent  Post-op Assessment: awake, alert, appropriately conversant, or baseline, no complications, patient tolerated procedure well with no complications and no evidence of recall      No complications documented.   No

## 2021-11-02 NOTE — ED ADULT NURSE NOTE - NS ED NURSE LEVEL OF CONSCIOUSNESS AFFECT
Please continue to follow-up with your doctors as needed/scheduled.  If you have not, please make sure you have an appointment with your REGULAR CARDIOLOGIST.    Call us with any questions/comments/concerns.    Next follow up appt is:  1 year    Please remember to ask your dentist for premedication prior to any dental work.  This is important protection for your new valve.      Thank you for choosing the [facility] for your heart care.  Our No. 1 priority is to provide you with excellent service and care for you and your family.      Your PROVIDER today was Cynthia Blank NP    Our goal is to provide exceptional service by treating you with courtesy and respect, and by explaining things in a way that you can understand, so that you may have the utmost confidence and trust in your care.  You may be receiving a patient satisfaction survey in the mail.  We LOVE to hear from patients so please take time to complete the survey, including as your comments and feedback.    
Calm

## 2021-11-30 NOTE — ED PROVIDER NOTE - PATIENT IS MEDICALLY STABLE FOR PSYCHIATRIC EVALUATION ADMISSION, OR TRANSFER TO ANOTHER FACILITY
Bedside report received.  Assessment complete.  A&O x 4. Patient calls appropriately.  Patient ambulates with standby assist. Bed alarm off.   Patient has 0/10 pain.  Denies N&V. Tolerating clear liquid diet until 0000.  + void, + flatus, - BM.  Patient denies SOB.  Review plan with of care with patient. Call light and personal belongings within reach. Hourly rounding in place. All needs met at this time.   Statement Selected

## 2021-12-01 ENCOUNTER — INPATIENT (INPATIENT)
Facility: HOSPITAL | Age: 31
LOS: 4 days | Discharge: NURSING, REHAB OR LONG-TERM CARE | DRG: 313 | End: 2021-12-06
Attending: HOSPITALIST | Admitting: HOSPITALIST
Payer: MEDICAID

## 2021-12-01 VITALS
RESPIRATION RATE: 18 BRPM | TEMPERATURE: 98 F | WEIGHT: 225.75 LBS | OXYGEN SATURATION: 95 % | HEART RATE: 72 BPM | DIASTOLIC BLOOD PRESSURE: 86 MMHG | SYSTOLIC BLOOD PRESSURE: 128 MMHG | HEIGHT: 71 IN

## 2021-12-01 DIAGNOSIS — R45.89 OTHER SYMPTOMS AND SIGNS INVOLVING EMOTIONAL STATE: ICD-10-CM

## 2021-12-01 DIAGNOSIS — J45.909 UNSPECIFIED ASTHMA, UNCOMPLICATED: ICD-10-CM
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
## 2021-12-01 DIAGNOSIS — F84.0 AUTISTIC DISORDER: ICD-10-CM

## 2021-12-01 DIAGNOSIS — R33.9 RETENTION OF URINE, UNSPECIFIED: ICD-10-CM

## 2021-12-01 DIAGNOSIS — R07.9 CHEST PAIN, UNSPECIFIED: ICD-10-CM

## 2021-12-01 DIAGNOSIS — Z29.9 ENCOUNTER FOR PROPHYLACTIC MEASURES, UNSPECIFIED: ICD-10-CM

## 2021-12-01 DIAGNOSIS — E03.9 HYPOTHYROIDISM, UNSPECIFIED: ICD-10-CM

## 2021-12-01 DIAGNOSIS — F90.9 ATTENTION-DEFICIT HYPERACTIVITY DISORDER, UNSPECIFIED TYPE: ICD-10-CM

## 2021-12-01 DIAGNOSIS — F79 UNSPECIFIED INTELLECTUAL DISABILITIES: ICD-10-CM

## 2021-12-01 DIAGNOSIS — F43.20 ADJUSTMENT DISORDER, UNSPECIFIED: ICD-10-CM

## 2021-12-01 DIAGNOSIS — K21.9 GASTRO-ESOPHAGEAL REFLUX DISEASE WITHOUT ESOPHAGITIS: ICD-10-CM

## 2021-12-01 LAB
ALBUMIN SERPL ELPH-MCNC: 3.4 G/DL — LOW (ref 3.5–5)
ALP SERPL-CCNC: 103 U/L — SIGNIFICANT CHANGE UP (ref 40–120)
ALT FLD-CCNC: 32 U/L DA — SIGNIFICANT CHANGE UP (ref 10–60)
ANION GAP SERPL CALC-SCNC: 8 MMOL/L — SIGNIFICANT CHANGE UP (ref 5–17)
APPEARANCE UR: CLEAR — SIGNIFICANT CHANGE UP
AST SERPL-CCNC: 20 U/L — SIGNIFICANT CHANGE UP (ref 10–40)
BASOPHILS # BLD AUTO: 0.04 K/UL — SIGNIFICANT CHANGE UP (ref 0–0.2)
BASOPHILS NFR BLD AUTO: 0.4 % — SIGNIFICANT CHANGE UP (ref 0–2)
BILIRUB SERPL-MCNC: 0.2 MG/DL — SIGNIFICANT CHANGE UP (ref 0.2–1.2)
BILIRUB UR-MCNC: NEGATIVE — SIGNIFICANT CHANGE UP
BUN SERPL-MCNC: 20 MG/DL — HIGH (ref 7–18)
CALCIUM SERPL-MCNC: 8.6 MG/DL — SIGNIFICANT CHANGE UP (ref 8.4–10.5)
CHLORIDE SERPL-SCNC: 111 MMOL/L — HIGH (ref 96–108)
CO2 SERPL-SCNC: 22 MMOL/L — SIGNIFICANT CHANGE UP (ref 22–31)
COLOR SPEC: YELLOW — SIGNIFICANT CHANGE UP
CREAT SERPL-MCNC: 0.96 MG/DL — SIGNIFICANT CHANGE UP (ref 0.5–1.3)
D DIMER BLD IA.RAPID-MCNC: <150 NG/ML DDU — SIGNIFICANT CHANGE UP
DIFF PNL FLD: NEGATIVE — SIGNIFICANT CHANGE UP
EOSINOPHIL # BLD AUTO: 0.15 K/UL — SIGNIFICANT CHANGE UP (ref 0–0.5)
EOSINOPHIL NFR BLD AUTO: 1.6 % — SIGNIFICANT CHANGE UP (ref 0–6)
ETHANOL SERPL-MCNC: <3 MG/DL — SIGNIFICANT CHANGE UP (ref 0–10)
GLUCOSE SERPL-MCNC: 105 MG/DL — HIGH (ref 70–99)
GLUCOSE UR QL: NEGATIVE — SIGNIFICANT CHANGE UP
HCT VFR BLD CALC: 42.3 % — SIGNIFICANT CHANGE UP (ref 39–50)
HGB BLD-MCNC: 12.9 G/DL — LOW (ref 13–17)
IMM GRANULOCYTES NFR BLD AUTO: 0.5 % — SIGNIFICANT CHANGE UP (ref 0–1.5)
KETONES UR-MCNC: NEGATIVE — SIGNIFICANT CHANGE UP
LEUKOCYTE ESTERASE UR-ACNC: NEGATIVE — SIGNIFICANT CHANGE UP
LYMPHOCYTES # BLD AUTO: 2.8 K/UL — SIGNIFICANT CHANGE UP (ref 1–3.3)
LYMPHOCYTES # BLD AUTO: 30.2 % — SIGNIFICANT CHANGE UP (ref 13–44)
MCHC RBC-ENTMCNC: 22.2 PG — LOW (ref 27–34)
MCHC RBC-ENTMCNC: 30.5 GM/DL — LOW (ref 32–36)
MCV RBC AUTO: 72.7 FL — LOW (ref 80–100)
MONOCYTES # BLD AUTO: 0.68 K/UL — SIGNIFICANT CHANGE UP (ref 0–0.9)
MONOCYTES NFR BLD AUTO: 7.3 % — SIGNIFICANT CHANGE UP (ref 2–14)
NEUTROPHILS # BLD AUTO: 5.54 K/UL — SIGNIFICANT CHANGE UP (ref 1.8–7.4)
NEUTROPHILS NFR BLD AUTO: 60 % — SIGNIFICANT CHANGE UP (ref 43–77)
NITRITE UR-MCNC: NEGATIVE — SIGNIFICANT CHANGE UP
NRBC # BLD: 0 /100 WBCS — SIGNIFICANT CHANGE UP (ref 0–0)
PH UR: 6 — SIGNIFICANT CHANGE UP (ref 5–8)
PLATELET # BLD AUTO: 299 K/UL — SIGNIFICANT CHANGE UP (ref 150–400)
POTASSIUM SERPL-MCNC: 3.8 MMOL/L — SIGNIFICANT CHANGE UP (ref 3.5–5.3)
POTASSIUM SERPL-SCNC: 3.8 MMOL/L — SIGNIFICANT CHANGE UP (ref 3.5–5.3)
PROT SERPL-MCNC: 6.5 G/DL — SIGNIFICANT CHANGE UP (ref 6–8.3)
PROT UR-MCNC: 15
RBC # BLD: 5.82 M/UL — HIGH (ref 4.2–5.8)
RBC # FLD: 18.6 % — HIGH (ref 10.3–14.5)
SARS-COV-2 RNA SPEC QL NAA+PROBE: SIGNIFICANT CHANGE UP
SODIUM SERPL-SCNC: 141 MMOL/L — SIGNIFICANT CHANGE UP (ref 135–145)
SP GR SPEC: 1.02 — SIGNIFICANT CHANGE UP (ref 1.01–1.02)
TROPONIN I, HIGH SENSITIVITY RESULT: 6 NG/L — SIGNIFICANT CHANGE UP
TROPONIN I, HIGH SENSITIVITY RESULT: 7.1 NG/L — SIGNIFICANT CHANGE UP
UROBILINOGEN FLD QL: NEGATIVE — SIGNIFICANT CHANGE UP
WBC # BLD: 9.26 K/UL — SIGNIFICANT CHANGE UP (ref 3.8–10.5)
WBC # FLD AUTO: 9.26 K/UL — SIGNIFICANT CHANGE UP (ref 3.8–10.5)

## 2021-12-01 PROCEDURE — 99285 EMERGENCY DEPT VISIT HI MDM: CPT

## 2021-12-01 PROCEDURE — 90792 PSYCH DIAG EVAL W/MED SRVCS: CPT | Mod: 95

## 2021-12-01 PROCEDURE — 71045 X-RAY EXAM CHEST 1 VIEW: CPT | Mod: 26

## 2021-12-01 PROCEDURE — 99223 1ST HOSP IP/OBS HIGH 75: CPT | Mod: GC

## 2021-12-01 RX ORDER — ATORVASTATIN CALCIUM 80 MG/1
20 TABLET, FILM COATED ORAL AT BEDTIME
Refills: 0 | Status: DISCONTINUED | OUTPATIENT
Start: 2021-12-01 | End: 2021-12-01

## 2021-12-01 RX ORDER — PANTOPRAZOLE SODIUM 20 MG/1
40 TABLET, DELAYED RELEASE ORAL
Refills: 0 | Status: DISCONTINUED | OUTPATIENT
Start: 2021-12-01 | End: 2021-12-06

## 2021-12-01 RX ORDER — ERGOCALCIFEROL 1.25 MG/1
50000 CAPSULE ORAL
Refills: 0 | Status: DISCONTINUED | OUTPATIENT
Start: 2021-12-01 | End: 2021-12-06

## 2021-12-01 RX ORDER — FINASTERIDE 5 MG/1
5 TABLET, FILM COATED ORAL DAILY
Refills: 0 | Status: DISCONTINUED | OUTPATIENT
Start: 2021-12-01 | End: 2021-12-06

## 2021-12-01 RX ORDER — QUETIAPINE FUMARATE 200 MG/1
200 TABLET, FILM COATED ORAL DAILY
Refills: 0 | Status: DISCONTINUED | OUTPATIENT
Start: 2021-12-01 | End: 2021-12-01

## 2021-12-01 RX ORDER — VALPROIC ACID (AS SODIUM SALT) 250 MG/5ML
250 SOLUTION, ORAL ORAL
Refills: 0 | Status: DISCONTINUED | OUTPATIENT
Start: 2021-12-01 | End: 2021-12-01

## 2021-12-01 RX ORDER — ACETAMINOPHEN 500 MG
975 TABLET ORAL ONCE
Refills: 0 | Status: COMPLETED | OUTPATIENT
Start: 2021-12-01 | End: 2021-12-01

## 2021-12-01 RX ORDER — CHLORPROMAZINE HCL 10 MG
50 TABLET ORAL EVERY 6 HOURS
Refills: 0 | Status: DISCONTINUED | OUTPATIENT
Start: 2021-12-01 | End: 2021-12-06

## 2021-12-01 RX ORDER — LEVOTHYROXINE SODIUM 125 MCG
50 TABLET ORAL DAILY
Refills: 0 | Status: DISCONTINUED | OUTPATIENT
Start: 2021-12-01 | End: 2021-12-06

## 2021-12-01 RX ORDER — ATORVASTATIN CALCIUM 80 MG/1
10 TABLET, FILM COATED ORAL AT BEDTIME
Refills: 0 | Status: DISCONTINUED | OUTPATIENT
Start: 2021-12-01 | End: 2021-12-06

## 2021-12-01 RX ORDER — TRAZODONE HCL 50 MG
100 TABLET ORAL DAILY
Refills: 0 | Status: DISCONTINUED | OUTPATIENT
Start: 2021-12-01 | End: 2021-12-06

## 2021-12-01 RX ORDER — TETANUS TOXOID, REDUCED DIPHTHERIA TOXOID AND ACELLULAR PERTUSSIS VACCINE, ADSORBED 5; 2.5; 8; 8; 2.5 [IU]/.5ML; [IU]/.5ML; UG/.5ML; UG/.5ML; UG/.5ML
0.5 SUSPENSION INTRAMUSCULAR ONCE
Refills: 0 | Status: COMPLETED | OUTPATIENT
Start: 2021-12-01 | End: 2021-12-01

## 2021-12-01 RX ORDER — MIRTAZAPINE 45 MG/1
15 TABLET, ORALLY DISINTEGRATING ORAL AT BEDTIME
Refills: 0 | Status: DISCONTINUED | OUTPATIENT
Start: 2021-12-01 | End: 2021-12-06

## 2021-12-01 RX ORDER — ACETAMINOPHEN 500 MG
650 TABLET ORAL EVERY 6 HOURS
Refills: 0 | Status: DISCONTINUED | OUTPATIENT
Start: 2021-12-01 | End: 2021-12-06

## 2021-12-01 RX ORDER — INFLUENZA VIRUS VACCINE 15; 15; 15; 15 UG/.5ML; UG/.5ML; UG/.5ML; UG/.5ML
0.5 SUSPENSION INTRAMUSCULAR ONCE
Refills: 0 | Status: DISCONTINUED | OUTPATIENT
Start: 2021-12-01 | End: 2021-12-06

## 2021-12-01 RX ORDER — QUETIAPINE FUMARATE 200 MG/1
50 TABLET, FILM COATED ORAL ONCE
Refills: 0 | Status: COMPLETED | OUTPATIENT
Start: 2021-12-01 | End: 2021-12-01

## 2021-12-01 RX ORDER — KETOROLAC TROMETHAMINE 30 MG/ML
15 SYRINGE (ML) INJECTION EVERY 6 HOURS
Refills: 0 | Status: DISCONTINUED | OUTPATIENT
Start: 2021-12-01 | End: 2021-12-01

## 2021-12-01 RX ORDER — QUETIAPINE FUMARATE 200 MG/1
50 TABLET, FILM COATED ORAL EVERY 6 HOURS
Refills: 0 | Status: DISCONTINUED | OUTPATIENT
Start: 2021-12-01 | End: 2021-12-01

## 2021-12-01 RX ORDER — TOPIRAMATE 25 MG
100 TABLET ORAL
Refills: 0 | Status: DISCONTINUED | OUTPATIENT
Start: 2021-12-01 | End: 2021-12-06

## 2021-12-01 RX ORDER — TAMSULOSIN HYDROCHLORIDE 0.4 MG/1
0.4 CAPSULE ORAL AT BEDTIME
Refills: 0 | Status: DISCONTINUED | OUTPATIENT
Start: 2021-12-01 | End: 2021-12-06

## 2021-12-01 RX ORDER — ALBUTEROL 90 UG/1
2 AEROSOL, METERED ORAL EVERY 6 HOURS
Refills: 0 | Status: DISCONTINUED | OUTPATIENT
Start: 2021-12-01 | End: 2021-12-06

## 2021-12-01 RX ORDER — IBUPROFEN 200 MG
400 TABLET ORAL ONCE
Refills: 0 | Status: COMPLETED | OUTPATIENT
Start: 2021-12-01 | End: 2021-12-01

## 2021-12-01 RX ORDER — ONDANSETRON 8 MG/1
4 TABLET, FILM COATED ORAL EVERY 6 HOURS
Refills: 0 | Status: DISCONTINUED | OUTPATIENT
Start: 2021-12-01 | End: 2021-12-06

## 2021-12-01 RX ADMIN — Medication 2 MILLIGRAM(S): at 11:14

## 2021-12-01 RX ADMIN — TETANUS TOXOID, REDUCED DIPHTHERIA TOXOID AND ACELLULAR PERTUSSIS VACCINE, ADSORBED 0.5 MILLILITER(S): 5; 2.5; 8; 8; 2.5 SUSPENSION INTRAMUSCULAR at 09:35

## 2021-12-01 RX ADMIN — Medication 400 MILLIGRAM(S): at 12:21

## 2021-12-01 RX ADMIN — Medication 2 MILLIGRAM(S): at 15:03

## 2021-12-01 RX ADMIN — QUETIAPINE FUMARATE 50 MILLIGRAM(S): 200 TABLET, FILM COATED ORAL at 15:02

## 2021-12-01 RX ADMIN — Medication 15 MILLIGRAM(S): at 20:19

## 2021-12-01 RX ADMIN — Medication 975 MILLIGRAM(S): at 02:50

## 2021-12-01 RX ADMIN — Medication 975 MILLIGRAM(S): at 02:51

## 2021-12-01 RX ADMIN — Medication 400 MILLIGRAM(S): at 14:57

## 2021-12-01 RX ADMIN — Medication 100 MILLIGRAM(S): at 20:18

## 2021-12-01 NOTE — H&P ADULT - PROBLEM SELECTOR PLAN 6
Pt with history of hypothyroidism on levothyroxine 50mcg  f/u TSH Pt with history of asthma  Not currently on any meds  No signs of respiratory distress Pt with history of asthma  Albuterol PRN  No signs of respiratory distress

## 2021-12-01 NOTE — H&P ADULT - PROBLEM SELECTOR PLAN 5
Pt with history of asthma  Not currently on any meds  No signs of respiratory distress Pt has history of urinary retention with recent admission requiring mckeon  Patient has suprapubic pain, low urinary output per nurse    f/u BS, place mckeon if >300cc Pt has history of urinary retention with recent admission requiring mckeon  Patient has suprapubic pain, low urinary output per nurse    f/u BS, straight cath if more than 300 Pt has history of urinary retention with recent admission requiring mckeon  Patient has suprapubic pain, low urinary output per nurse  True urinary retention vs somatic     f/u BS, straight cath if more than 300

## 2021-12-01 NOTE — ED BEHAVIORAL HEALTH ASSESSMENT NOTE - NSSUICRSKFACTOR_PSY_ALL_CORE
Current and Past Psychiatric Diagnoses/Historical Factors/Activating Events/Stressors Current and Past Psychiatric Diagnoses/Presenting Symptoms/Historical Factors/Treatment Related Factors/Activating Events/Stressors
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
## 2021-12-01 NOTE — H&P ADULT - NSHPPHYSICALEXAM_GEN_ALL_CORE
GENERAL: Patient laying comfortably in bed  HEAD:  Atraumatic, Normocephalic  EYES: EOMI, PERRLA, conjunctiva and sclera clear  CHEST/LUNG: Clear to auscultation bilaterally; No wheeze; No crackles; left sided chest pain on palpation  HEART: Regular rate and rhythm; No murmurs;   ABDOMEN: Soft, suprapubic tenderness,, Nondistended; Bowel sounds present; No guarding  EXTREMITIES:  2+ Peripheral Pulses, No cyanosis or edema  PSYCH:  Patient speaks slowly, does not directly answer many questions, repeatedly asking to have mckeon placed  NEUROLOGY: non-focal, AAO X 3. Strength is 5/5. no sensory loss.  SKIN: No rashes or lesions GENERAL: Patient laying comfortably in bed  HEAD:  Atraumatic, Normocephalic  EYES: EOMI, PERRLA, conjunctiva and sclera clear  CHEST/LUNG: Clear to auscultation bilaterally; No wheeze; No crackles; left sided chest pain on palpation  HEART: Regular rate and rhythm; No murmurs;   ABDOMEN: Soft, suprapubic tenderness,, Nondistended; Bowel sounds present; No guarding, bruises on lower and mid abdomen  EXTREMITIES:  2+ Peripheral Pulses, No cyanosis or edema, bruises on b/l leg and arms, scrapes on left wrist with bandage  PSYCH:  Patient speaks slowly, does not directly answer many questions, repeatedly asking to have mckeon placed  NEUROLOGY: non-focal, AAO X 3. Strength is 5/5. no sensory loss.  SKIN: No rashes or lesions
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
## 2021-12-01 NOTE — H&P ADULT - PROBLEM SELECTOR PLAN 2
Patient presented with chest pain for past 1 week  Left sided chest pain, sharp, reproducible  Low risk for ACS  Trop negative x 1, EKG NSR, D-dimer wnl    c/w pantoprazole  f/u T2  Tylenol for pain Pt has history of self harm (wrist cutting, head banging, punching walls)  Pt observed to be cutting wrist with key in ED  Meds taken from previous admission, pharmacy call disconnected x 3    1:1 observation  c/w home psych meds (ativan 1mg BID, remeron 15 qhs, topamax 100 bid, buspirone 15 BID) Pt has history of self harm (wrist cutting, head banging, punching walls)  Pt observed to be cutting wrist with key in ED  Meds taken from Psych note, pharmacy call disconnected x 3    1:1 observation  c/w home psych meds (ativan 1mg BID, remeron 15 qhs, topamax 100 bid, buspirone 15 BID)
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
## 2021-12-01 NOTE — H&P ADULT - PROBLEM SELECTOR PLAN 3
Pt has history of urinary retention with recent admission requiring mckeon  Patient has suprapubic pain, low urinary output per nurse    f/u BS, place mckeon if >300cc Pt with history of ADHD, on Tenex 1mg BID at home, pharmacy does not carry

## 2021-12-01 NOTE — ED PROVIDER NOTE - PSYCHIATRIC, MLM
Alert and oriented to person, place, time/situation. normal mood and affect. no apparent risk to self or others.
- - -
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
## 2021-12-01 NOTE — ED BEHAVIORAL HEALTH ASSESSMENT NOTE - DETAILS
see HPI zyprexa - rash per chart patient is referent Per collateral return to the ED should you have thoughts of hurting or killing yourself or anyone else (patient unable to engage in additional safety planning) patient denies currently

## 2021-12-01 NOTE — ED BEHAVIORAL HEALTH ASSESSMENT NOTE - NSACTIVEVENT_PSY_ALL_CORE
dislikes group home/Triggering events leading to humiliation, shame, and/or despair (e.g., Loss of relationship, financial or health status) (real or anticipated) dislikes respite home/Triggering events leading to humiliation, shame, and/or despair (e.g., Loss of relationship, financial or health status) (real or anticipated)

## 2021-12-01 NOTE — PATIENT PROFILE ADULT - PUBLIC BENEFITS
Encounter addended by: Francesca Matthews R.N. on: 8/23/2019 9:41 AM   Actions taken: Charge Capture section accepted no

## 2021-12-01 NOTE — H&P ADULT - PROBLEM SELECTOR PLAN 9
RISK                                                          Points  [] Previous VTE                                           3  [] Thrombophilia                                        2  [] Lower limb paralysis                              2   [] Current Cancer                                       2   [x] Immobilization > 24 hrs                        1  [] ICU/CCU stay > 24 hours                       1  [] Age > 60                                                   1    Lovenox for DVT  PPI

## 2021-12-01 NOTE — PATIENT PROFILE ADULT - FALL HARM RISK - HARM RISK INTERVENTIONS
Assistance with ambulation/Communicate Risk of Fall with Harm to all staff/Monitor for mental status changes/Monitor gait and stability/Reinforce activity limits and safety measures with patient and family/Reorient to person, place and time as needed/Review medications for side effects contributing to fall risk/Tailored Fall Risk Interventions/Use of alarms - bed, chair and/or voice tab/Visual Cue: Yellow wristband and red socks/Other:/Bed in lowest position, wheels locked, appropriate side rails in place/Call bell, personal items and telephone in reach/Instruct patient to call for assistance before getting out of bed or chair/Non-slip footwear when patient is out of bed/[place] to call system/Physically safe environment - no spills, clutter or unnecessary equipment/Purposeful Proactive Rounding/Room/bathroom lighting operational, light cord in reach
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
## 2021-12-01 NOTE — H&P ADULT - PROBLEM SELECTOR PLAN 7
Pt with history of GERD  c/w pantoprazole home dose Pt with history of hypothyroidism on levothyroxine 50mcg  f/u TSH

## 2021-12-01 NOTE — ED BEHAVIORAL HEALTH ASSESSMENT NOTE - DESCRIPTION
see HPI See BTCM note Previously living with fiance but living in respite apt since beginning of 2021 after they broke up
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
## 2021-12-01 NOTE — ED BEHAVIORAL HEALTH ASSESSMENT NOTE - CURRENT MEDICATION
tamsulosin 0.4 mg oral capsule: 1 cap(s) orally once a day (at bedtime)  · 	finasteride 5 mg oral tablet: 1 tab(s) orally once a day  · 	amantadine 100 mg oral tablet: 1 tab(s) orally 2 times a day  · 	baclofen 10 mg oral tablet: 1 tab(s) orally 3 times a day  · 	Dulera 100 mcg-5 mcg/inh inhalation aerosol: 2 puff(s) inhaled 2 times a day  · 	FLUoxetine 10 mg oral capsule: 1 cap(s) orally once a day  · 	FLUoxetine 20 mg oral capsule: 1 cap(s) orally once a day  · 	gabapentin 800 mg oral tablet: 1 tab(s) orally 3 times a day  · 	levothyroxine 50 mcg (0.05 mg) oral tablet: 1 tab(s) orally once a day  · 	pantoprazole 40 mg oral delayed release tablet: 1 tab(s) orally once a day  · 	polyethylene glycol 3350 oral powder for reconstitution: 1 each orally once a day  · 	simvastatin 5 mg oral tablet: 1 tab(s) orally once a day (at bedtime)  · 	Vitamin D2 50,000 intl units (1.25 mg) oral capsule: 1 cap(s) orally once a week  · 	LORazepam 1 mg oral tablet: 1 tab(s) orally 2 times a day  · 	Cogentin 1 mg oral tablet: 1 tab(s) orally 2 times a day  · 	cloNIDine 0.1 mg oral tablet: 1 tab(s) orally once a day (at bedtime)  · 	OXcarbazepine 300 mg oral tablet: 1 tab(s) orally 2 times a day  · 	lithium carbonate: 150 milligram(s) orally 2 times a day  · 	Depakote 500 mg oral delayed release tablet: 1 tab(s) orally 2 times a day  · 	perphenazine 4 mg oral tablet: 1 tab(s) orally 3 times a day Unclear at the moment, per collateral he gets buspar, remeron, ativan, topmoax, had discontinued abilfy, naltrexone, prozac during last hospitalization

## 2021-12-01 NOTE — ED BEHAVIORAL HEALTH ASSESSMENT NOTE - RISK ASSESSMENT
Low Acute Suicide Risk Pt is at chronically elevated risk of harm to self/others given h/o self-harm, h/o aggression, poor impulse control and poor frustration tolerance due to intellectual disability,   protective factors include being domiciled, no hx SA, on 1:1 supervision at group home, no access to guns/weapons, denies insomnia, future-oriented, help-seeking.   Pt is not at acutely elevated risk of harm to self or others. Pt is at chronically elevated risk of harm to self/others given h/o self-harm, h/o aggression, poor impulse control and poor frustration tolerance due to intellectual disability, recent inpatient discharge, change in medications, prior trauma  Protective factors include lack of prior attempts, stable housing, sobriety, connection to services, supportive staff Moderate Acute Suicide Risk

## 2021-12-01 NOTE — H&P ADULT - PROBLEM SELECTOR PLAN 4
Pt has history of self harm (wrist cutting, head banging, punching walls)  Pt observed to be cutting wrist with key in ED  Meds taken from previous admission, pharmacy call disconnected x 3    c/w Clonidine 0.2 qhs, depakene 250 mg BID, quietapine 200 mg qd for phychiatric and behavioral issues Patient presented with chest pain for past 1 week  Left sided chest pain, sharp, reproducible  Low risk for ACS  Trop negative x 1, EKG NSR, D-dimer wnl    c/w pantoprazole  f/u T2  Tylenol for pain

## 2021-12-01 NOTE — ED BEHAVIORAL HEALTH ASSESSMENT NOTE - NSCURPASTPSYDX_PSY_ALL_CORE
intellectual disability/Conduct problems intellectual disability/Mood disorder/ADHD/Conduct problems
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
## 2021-12-01 NOTE — ED BEHAVIORAL HEALTH ASSESSMENT NOTE - NSSUICPROTFACT_PSY_ALL_CORE
Responsibility to children, family, or others/Identifies reasons for living/Supportive social network of family or friends Supportive social network of family or friends

## 2021-12-02 LAB
ANION GAP SERPL CALC-SCNC: 8 MMOL/L — SIGNIFICANT CHANGE UP (ref 5–17)
BASOPHILS # BLD AUTO: 0.02 K/UL — SIGNIFICANT CHANGE UP (ref 0–0.2)
BASOPHILS NFR BLD AUTO: 0.2 % — SIGNIFICANT CHANGE UP (ref 0–2)
BUN SERPL-MCNC: 19 MG/DL — HIGH (ref 7–18)
C TRACH RRNA SPEC QL NAA+PROBE: SIGNIFICANT CHANGE UP
CALCIUM SERPL-MCNC: 9 MG/DL — SIGNIFICANT CHANGE UP (ref 8.4–10.5)
CHLORIDE SERPL-SCNC: 111 MMOL/L — HIGH (ref 96–108)
CO2 SERPL-SCNC: 21 MMOL/L — LOW (ref 22–31)
CREAT SERPL-MCNC: 0.96 MG/DL — SIGNIFICANT CHANGE UP (ref 0.5–1.3)
EOSINOPHIL # BLD AUTO: 0.16 K/UL — SIGNIFICANT CHANGE UP (ref 0–0.5)
EOSINOPHIL NFR BLD AUTO: 1.7 % — SIGNIFICANT CHANGE UP (ref 0–6)
GLUCOSE SERPL-MCNC: 92 MG/DL — SIGNIFICANT CHANGE UP (ref 70–99)
HCT VFR BLD CALC: 46 % — SIGNIFICANT CHANGE UP (ref 39–50)
HGB BLD-MCNC: 14.1 G/DL — SIGNIFICANT CHANGE UP (ref 13–17)
IMM GRANULOCYTES NFR BLD AUTO: 0.5 % — SIGNIFICANT CHANGE UP (ref 0–1.5)
LYMPHOCYTES # BLD AUTO: 1.85 K/UL — SIGNIFICANT CHANGE UP (ref 1–3.3)
LYMPHOCYTES # BLD AUTO: 20.2 % — SIGNIFICANT CHANGE UP (ref 13–44)
MAGNESIUM SERPL-MCNC: 2.2 MG/DL — SIGNIFICANT CHANGE UP (ref 1.6–2.6)
MCHC RBC-ENTMCNC: 22.1 PG — LOW (ref 27–34)
MCHC RBC-ENTMCNC: 30.7 GM/DL — LOW (ref 32–36)
MCV RBC AUTO: 72 FL — LOW (ref 80–100)
MONOCYTES # BLD AUTO: 0.54 K/UL — SIGNIFICANT CHANGE UP (ref 0–0.9)
MONOCYTES NFR BLD AUTO: 5.9 % — SIGNIFICANT CHANGE UP (ref 2–14)
N GONORRHOEA RRNA SPEC QL NAA+PROBE: SIGNIFICANT CHANGE UP
NEUTROPHILS # BLD AUTO: 6.55 K/UL — SIGNIFICANT CHANGE UP (ref 1.8–7.4)
NEUTROPHILS NFR BLD AUTO: 71.5 % — SIGNIFICANT CHANGE UP (ref 43–77)
NRBC # BLD: 0 /100 WBCS — SIGNIFICANT CHANGE UP (ref 0–0)
PHOSPHATE SERPL-MCNC: 3.3 MG/DL — SIGNIFICANT CHANGE UP (ref 2.5–4.5)
PLATELET # BLD AUTO: 304 K/UL — SIGNIFICANT CHANGE UP (ref 150–400)
POTASSIUM SERPL-MCNC: 3.9 MMOL/L — SIGNIFICANT CHANGE UP (ref 3.5–5.3)
POTASSIUM SERPL-SCNC: 3.9 MMOL/L — SIGNIFICANT CHANGE UP (ref 3.5–5.3)
RBC # BLD: 6.39 M/UL — HIGH (ref 4.2–5.8)
RBC # FLD: 19.1 % — HIGH (ref 10.3–14.5)
SODIUM SERPL-SCNC: 140 MMOL/L — SIGNIFICANT CHANGE UP (ref 135–145)
SPECIMEN SOURCE: SIGNIFICANT CHANGE UP
WBC # BLD: 9.17 K/UL — SIGNIFICANT CHANGE UP (ref 3.8–10.5)
WBC # FLD AUTO: 9.17 K/UL — SIGNIFICANT CHANGE UP (ref 3.8–10.5)

## 2021-12-02 PROCEDURE — 90792 PSYCH DIAG EVAL W/MED SRVCS: CPT

## 2021-12-02 PROCEDURE — 99233 SBSQ HOSP IP/OBS HIGH 50: CPT | Mod: GC

## 2021-12-02 RX ORDER — CHLORPROMAZINE HCL 10 MG
50 TABLET ORAL ONCE
Refills: 0 | Status: DISCONTINUED | OUTPATIENT
Start: 2021-12-02 | End: 2021-12-02

## 2021-12-02 RX ORDER — QUETIAPINE FUMARATE 200 MG/1
200 TABLET, FILM COATED ORAL DAILY
Refills: 0 | Status: DISCONTINUED | OUTPATIENT
Start: 2021-12-02 | End: 2021-12-06

## 2021-12-02 RX ORDER — CHLORPROMAZINE HCL 10 MG
50 TABLET ORAL ONCE
Refills: 0 | Status: DISCONTINUED | OUTPATIENT
Start: 2021-12-02 | End: 2021-12-06

## 2021-12-02 RX ORDER — QUETIAPINE FUMARATE 200 MG/1
50 TABLET, FILM COATED ORAL EVERY 6 HOURS
Refills: 0 | Status: DISCONTINUED | OUTPATIENT
Start: 2021-12-02 | End: 2021-12-06

## 2021-12-02 RX ADMIN — FINASTERIDE 5 MILLIGRAM(S): 5 TABLET, FILM COATED ORAL at 11:15

## 2021-12-02 RX ADMIN — Medication 50 MILLIGRAM(S): at 20:05

## 2021-12-02 RX ADMIN — QUETIAPINE FUMARATE 50 MILLIGRAM(S): 200 TABLET, FILM COATED ORAL at 17:51

## 2021-12-02 RX ADMIN — Medication 1 MILLIGRAM(S): at 06:30

## 2021-12-02 RX ADMIN — ERGOCALCIFEROL 50000 UNIT(S): 1.25 CAPSULE ORAL at 06:30

## 2021-12-02 RX ADMIN — Medication 15 MILLIGRAM(S): at 06:30

## 2021-12-02 RX ADMIN — Medication 1 MILLIGRAM(S): at 17:48

## 2021-12-02 RX ADMIN — QUETIAPINE FUMARATE 50 MILLIGRAM(S): 200 TABLET, FILM COATED ORAL at 11:15

## 2021-12-02 RX ADMIN — Medication 50 MILLIGRAM(S): at 07:30

## 2021-12-02 RX ADMIN — Medication 100 MILLIGRAM(S): at 11:15

## 2021-12-02 RX ADMIN — Medication 100 MILLIGRAM(S): at 17:51

## 2021-12-02 RX ADMIN — PANTOPRAZOLE SODIUM 40 MILLIGRAM(S): 20 TABLET, DELAYED RELEASE ORAL at 06:37

## 2021-12-02 RX ADMIN — Medication 100 MILLIGRAM(S): at 06:30

## 2021-12-02 RX ADMIN — Medication 15 MILLIGRAM(S): at 17:48

## 2021-12-02 RX ADMIN — Medication 50 MICROGRAM(S): at 06:30
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
## 2021-12-02 NOTE — BH CONSULTATION LIAISON ASSESSMENT NOTE - NSBHCHARTREVIEWVS_PSY_A_CORE FT
Vital Signs Last 24 Hrs  T(C): 36.9 (02 Dec 2021 13:00), Max: 36.9 (02 Dec 2021 13:00)  T(F): 98.5 (02 Dec 2021 13:00), Max: 98.5 (02 Dec 2021 13:00)  HR: 80 (02 Dec 2021 13:00) (63 - 80)  BP: 119/77 (02 Dec 2021 13:00) (98/54 - 119/77)  BP(mean): --  RR: 20 (02 Dec 2021 13:00) (18 - 20)  SpO2: 98% (02 Dec 2021 13:00) (97% - 100%)
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
## 2021-12-02 NOTE — BH CONSULTATION LIAISON ASSESSMENT NOTE - CURRENT MEDICATION
MEDICATIONS  (STANDING):  atorvastatin 10 milliGRAM(s) Oral at bedtime  busPIRone 15 milliGRAM(s) Oral two times a day  chlorproMAZINE    Injectable 50 milliGRAM(s) IntraMuscular once  ergocalciferol 33541 Unit(s) Oral <User Schedule>  finasteride 5 milliGRAM(s) Oral daily  influenza   Vaccine 0.5 milliLiter(s) IntraMuscular once  levothyroxine 50 MICROGram(s) Oral daily  LORazepam     Tablet 1 milliGRAM(s) Oral two times a day  mirtazapine 15 milliGRAM(s) Oral at bedtime  pantoprazole    Tablet 40 milliGRAM(s) Oral before breakfast  QUEtiapine 200 milliGRAM(s) Oral daily  tamsulosin 0.4 milliGRAM(s) Oral at bedtime  topiramate 100 milliGRAM(s) Oral two times a day  traZODone 100 milliGRAM(s) Oral daily    MEDICATIONS  (PRN):  acetaminophen     Tablet .. 650 milliGRAM(s) Oral every 6 hours PRN Temp greater or equal to 38C (100.4F), Moderate Pain (4 - 6)  ALBUTerol    90 MICROgram(s) HFA Inhaler 2 Puff(s) Inhalation every 6 hours PRN Shortness of Breath and/or Wheezing  chlorproMAZINE    Injectable 50 milliGRAM(s) IntraMuscular every 6 hours PRN Severe Agitation  ketorolac   Injectable 15 milliGRAM(s) IV Push every 6 hours PRN Mild Pain (1 - 3)  ondansetron Injectable 4 milliGRAM(s) IV Push every 6 hours PRN Nausea and/or Vomiting  QUEtiapine 50 milliGRAM(s) Oral every 6 hours PRN aggitation

## 2021-12-02 NOTE — BH CONSULTATION LIAISON ASSESSMENT NOTE - NSBHCHARTREVIEWLAB_PSY_A_CORE FT
14.1   9.17  )-----------( 304      ( 02 Dec 2021 08:08 )             46.0   12-02    140  |  111<H>  |  19<H>  ----------------------------<  92  3.9   |  21<L>  |  0.96    Ca    9.0      02 Dec 2021 08:08  Phos  3.3     12-02  Mg     2.2     12-02

## 2021-12-02 NOTE — BH CONSULTATION LIAISON ASSESSMENT NOTE - NSACTIVEVENT_PSY_ALL_CORE
dislikes respite home/Triggering events leading to humiliation, shame, and/or despair (e.g., Loss of relationship, financial or health status) (real or anticipated)

## 2021-12-02 NOTE — BH CONSULTATION LIAISON ASSESSMENT NOTE - DIFFERENTIAL
Moderate intellectual disability  Autism spectrum disorder  HAVEN  PTSD  Mood DO NOS  Somatic symptom DO  ADHD

## 2021-12-02 NOTE — BH CONSULTATION LIAISON ASSESSMENT NOTE - NSICDXBHSECONDARYDX_PSY_ALL_CORE
Autism spectrum disorder   F84.0  HAVEN (generalized anxiety disorder)   F41.1  Mood disorder   F39  Chronic post-traumatic stress disorder (PTSD)   F43.12  Somatic symptom disorder   F45.1  ADHD   F90.9
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
## 2021-12-02 NOTE — BH CONSULTATION LIAISON ASSESSMENT NOTE - RISK ASSESSMENT
Risk factors: Intellectual disability, ASD and ADHD, h/o NSSIB, poor frustration tolerance. Protective factors: Absent h/o SI/SA, stable domicile in respite with 1:1 observation, supportive family, help seeking, engaged in care and taking medications. Acute risk of suicide appears low at the time of assessment, but chronic risk may be mildly elevated due to above risk factors. Acute and chronic risk of self-injury appear moderate to high.

## 2021-12-02 NOTE — BH CONSULTATION LIAISON ASSESSMENT NOTE - DESCRIPTION
Previously living with fiance but living in respite apt since beginning of 2021 after they broke up Born in [place], raised in [place]. Parents are no longer together; mother lives in [place] and works as dialysis technician. Also has sister who lives in [place]. Previously living in [facility] group home where he had GF (fellow resident), but relationship ended >2 yrs ago. Living in [facility] since 1/2021 due to breakup, conflicts with fellow residents.  Born in [place], raised in [place]. Parents are no longer together; mother lives in [place] and works as dialysis technician. Has sister who lives in [place]. Previously living in [facility] group home where he had GF (fellow resident), but relationship ended >2 yrs ago. Living in respite [facility] since 1/2021 due to breakup, conflicts with fellow residents.

## 2021-12-03 DIAGNOSIS — F84.0 AUTISTIC DISORDER: ICD-10-CM

## 2021-12-03 DIAGNOSIS — F43.12 POST-TRAUMATIC STRESS DISORDER, CHRONIC: ICD-10-CM

## 2021-12-03 DIAGNOSIS — F45.1 UNDIFFERENTIATED SOMATOFORM DISORDER: ICD-10-CM

## 2021-12-03 DIAGNOSIS — F41.1 GENERALIZED ANXIETY DISORDER: ICD-10-CM

## 2021-12-03 DIAGNOSIS — F39 UNSPECIFIED MOOD [AFFECTIVE] DISORDER: ICD-10-CM

## 2021-12-03 PROCEDURE — 99233 SBSQ HOSP IP/OBS HIGH 50: CPT | Mod: GC

## 2021-12-03 PROCEDURE — 99232 SBSQ HOSP IP/OBS MODERATE 35: CPT

## 2021-12-03 RX ADMIN — PANTOPRAZOLE SODIUM 40 MILLIGRAM(S): 20 TABLET, DELAYED RELEASE ORAL at 08:46

## 2021-12-03 RX ADMIN — MIRTAZAPINE 15 MILLIGRAM(S): 45 TABLET, ORALLY DISINTEGRATING ORAL at 21:02

## 2021-12-03 RX ADMIN — Medication 100 MILLIGRAM(S): at 17:41

## 2021-12-03 RX ADMIN — TAMSULOSIN HYDROCHLORIDE 0.4 MILLIGRAM(S): 0.4 CAPSULE ORAL at 21:01

## 2021-12-03 RX ADMIN — QUETIAPINE FUMARATE 200 MILLIGRAM(S): 200 TABLET, FILM COATED ORAL at 12:26

## 2021-12-03 RX ADMIN — Medication 100 MILLIGRAM(S): at 08:45

## 2021-12-03 RX ADMIN — Medication 100 MILLIGRAM(S): at 12:27

## 2021-12-03 RX ADMIN — QUETIAPINE FUMARATE 50 MILLIGRAM(S): 200 TABLET, FILM COATED ORAL at 21:01

## 2021-12-03 RX ADMIN — QUETIAPINE FUMARATE 50 MILLIGRAM(S): 200 TABLET, FILM COATED ORAL at 08:45

## 2021-12-03 RX ADMIN — FINASTERIDE 5 MILLIGRAM(S): 5 TABLET, FILM COATED ORAL at 12:26

## 2021-12-03 RX ADMIN — Medication 50 MICROGRAM(S): at 08:46

## 2021-12-03 RX ADMIN — Medication 1 MILLIGRAM(S): at 17:41

## 2021-12-03 RX ADMIN — Medication 15 MILLIGRAM(S): at 17:41

## 2021-12-03 RX ADMIN — ATORVASTATIN CALCIUM 10 MILLIGRAM(S): 80 TABLET, FILM COATED ORAL at 21:01

## 2021-12-03 RX ADMIN — Medication 1 MILLIGRAM(S): at 08:45

## 2021-12-03 RX ADMIN — Medication 50 MILLIGRAM(S): at 09:52

## 2021-12-03 RX ADMIN — Medication 15 MILLIGRAM(S): at 08:45
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
## 2021-12-04 LAB
TROPONIN I, HIGH SENSITIVITY RESULT: 5.2 NG/L — SIGNIFICANT CHANGE UP
TROPONIN I, HIGH SENSITIVITY RESULT: 5.2 NG/L — SIGNIFICANT CHANGE UP

## 2021-12-04 PROCEDURE — 93010 ELECTROCARDIOGRAM REPORT: CPT

## 2021-12-04 PROCEDURE — 99233 SBSQ HOSP IP/OBS HIGH 50: CPT | Mod: GC

## 2021-12-04 PROCEDURE — 73130 X-RAY EXAM OF HAND: CPT | Mod: 26,RT

## 2021-12-04 RX ORDER — HYDROMORPHONE HYDROCHLORIDE 2 MG/ML
2 INJECTION INTRAMUSCULAR; INTRAVENOUS; SUBCUTANEOUS ONCE
Refills: 0 | Status: DISCONTINUED | OUTPATIENT
Start: 2021-12-04 | End: 2021-12-04

## 2021-12-04 RX ADMIN — Medication 100 MILLIGRAM(S): at 11:06

## 2021-12-04 RX ADMIN — ATORVASTATIN CALCIUM 10 MILLIGRAM(S): 80 TABLET, FILM COATED ORAL at 21:20

## 2021-12-04 RX ADMIN — Medication 1 MILLIGRAM(S): at 17:22

## 2021-12-04 RX ADMIN — QUETIAPINE FUMARATE 200 MILLIGRAM(S): 200 TABLET, FILM COATED ORAL at 11:06

## 2021-12-04 RX ADMIN — HYDROMORPHONE HYDROCHLORIDE 2 MILLIGRAM(S): 2 INJECTION INTRAMUSCULAR; INTRAVENOUS; SUBCUTANEOUS at 23:50

## 2021-12-04 RX ADMIN — Medication 100 MILLIGRAM(S): at 06:03

## 2021-12-04 RX ADMIN — FINASTERIDE 5 MILLIGRAM(S): 5 TABLET, FILM COATED ORAL at 11:06

## 2021-12-04 RX ADMIN — PANTOPRAZOLE SODIUM 40 MILLIGRAM(S): 20 TABLET, DELAYED RELEASE ORAL at 06:03

## 2021-12-04 RX ADMIN — Medication 50 MICROGRAM(S): at 06:03

## 2021-12-04 RX ADMIN — MIRTAZAPINE 15 MILLIGRAM(S): 45 TABLET, ORALLY DISINTEGRATING ORAL at 21:20

## 2021-12-04 RX ADMIN — TAMSULOSIN HYDROCHLORIDE 0.4 MILLIGRAM(S): 0.4 CAPSULE ORAL at 21:20

## 2021-12-04 RX ADMIN — Medication 50 MILLIGRAM(S): at 22:55

## 2021-12-04 RX ADMIN — Medication 15 MILLIGRAM(S): at 17:18

## 2021-12-04 RX ADMIN — Medication 100 MILLIGRAM(S): at 17:18

## 2021-12-04 RX ADMIN — Medication 1 MILLIGRAM(S): at 06:02

## 2021-12-04 RX ADMIN — Medication 15 MILLIGRAM(S): at 06:03

## 2021-12-04 RX ADMIN — QUETIAPINE FUMARATE 50 MILLIGRAM(S): 200 TABLET, FILM COATED ORAL at 21:20

## 2021-12-04 NOTE — PROGRESS NOTE ADULT - PROBLEM SELECTOR PLAN 8
Pt with history of GERD  c/w pantoprazole home dose

## 2021-12-04 NOTE — PROGRESS NOTE ADULT - PROBLEM SELECTOR PLAN 7
Pt with history of hypothyroidism on levothyroxine 50mcg  f/u TSH

## 2021-12-04 NOTE — PROGRESS NOTE ADULT - PROBLEM SELECTOR PLAN 6
Pt with history of asthma  Albuterol PRN  No signs of respiratory distress

## 2021-12-04 NOTE — PROGRESS NOTE ADULT - PROBLEM SELECTOR PLAN 3
Pt with history of ADHD, on Tenex 1mg BID at home, pharmacy does not carry

## 2021-12-04 NOTE — PROGRESS NOTE ADULT - PROBLEM SELECTOR PLAN 2
Pt has history of self harm (wrist cutting, head banging, punching walls)  Pt observed to be cutting wrist with key in ED  Meds taken from Psych note, pharmacy call disconnected x 3    1:1 observation  c/w home psych meds (ativan 1mg BID, remeron 15 qhs, topamax 100 bid, buspirone 15 BID)  - Dr. Dumas following.

## 2021-12-04 NOTE — PROGRESS NOTE ADULT - PROBLEM SELECTOR PLAN 4
Patient presented with chest pain for past 1 week  Left sided chest pain, sharp, reproducible  Low risk for ACS  Trop negative x 12, EKG NSR, D-dimer wnl  - Resolved.

## 2021-12-04 NOTE — PROGRESS NOTE ADULT - NUTRITIONAL ASSESSMENT
Diet, Regular:   DASH/TLC {Sodium & Cholesterol Restricted} (12-01-21 @ 16:40) [Active]
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
## 2021-12-04 NOTE — PROGRESS NOTE ADULT - PROBLEM SELECTOR PLAN 5
Pt has history of urinary retention with recent admission requiring mckeon  Patient has suprapubic pain, low urinary output per nurse  True urinary retention vs somatic     f/u BS, straight cath if more than 300

## 2021-12-05 PROCEDURE — 99233 SBSQ HOSP IP/OBS HIGH 50: CPT

## 2021-12-05 RX ORDER — OXYCODONE AND ACETAMINOPHEN 5; 325 MG/1; MG/1
1 TABLET ORAL EVERY 6 HOURS
Refills: 0 | Status: DISCONTINUED | OUTPATIENT
Start: 2021-12-05 | End: 2021-12-06

## 2021-12-05 RX ORDER — TRAZODONE HCL 50 MG
50 TABLET ORAL ONCE
Refills: 0 | Status: COMPLETED | OUTPATIENT
Start: 2021-12-05 | End: 2021-12-05

## 2021-12-05 RX ADMIN — Medication 15 MILLIGRAM(S): at 05:02

## 2021-12-05 RX ADMIN — TAMSULOSIN HYDROCHLORIDE 0.4 MILLIGRAM(S): 0.4 CAPSULE ORAL at 22:24

## 2021-12-05 RX ADMIN — MIRTAZAPINE 15 MILLIGRAM(S): 45 TABLET, ORALLY DISINTEGRATING ORAL at 22:24

## 2021-12-05 RX ADMIN — Medication 100 MILLIGRAM(S): at 12:00

## 2021-12-05 RX ADMIN — Medication 1 MILLIGRAM(S): at 17:06

## 2021-12-05 RX ADMIN — Medication 50 MILLIGRAM(S): at 02:36

## 2021-12-05 RX ADMIN — Medication 15 MILLIGRAM(S): at 17:05

## 2021-12-05 RX ADMIN — Medication 1 MILLIGRAM(S): at 05:02

## 2021-12-05 RX ADMIN — ATORVASTATIN CALCIUM 10 MILLIGRAM(S): 80 TABLET, FILM COATED ORAL at 22:24

## 2021-12-05 RX ADMIN — Medication 50 MICROGRAM(S): at 05:02

## 2021-12-05 RX ADMIN — QUETIAPINE FUMARATE 200 MILLIGRAM(S): 200 TABLET, FILM COATED ORAL at 12:00

## 2021-12-05 RX ADMIN — PANTOPRAZOLE SODIUM 40 MILLIGRAM(S): 20 TABLET, DELAYED RELEASE ORAL at 05:02

## 2021-12-05 RX ADMIN — HYDROMORPHONE HYDROCHLORIDE 2 MILLIGRAM(S): 2 INJECTION INTRAMUSCULAR; INTRAVENOUS; SUBCUTANEOUS at 00:43

## 2021-12-05 RX ADMIN — Medication 100 MILLIGRAM(S): at 05:02

## 2021-12-05 RX ADMIN — QUETIAPINE FUMARATE 50 MILLIGRAM(S): 200 TABLET, FILM COATED ORAL at 22:24

## 2021-12-05 RX ADMIN — FINASTERIDE 5 MILLIGRAM(S): 5 TABLET, FILM COATED ORAL at 12:00

## 2021-12-05 RX ADMIN — Medication 100 MILLIGRAM(S): at 17:05
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
## 2021-12-06 ENCOUNTER — TRANSCRIPTION ENCOUNTER (OUTPATIENT)
Age: 31
End: 2021-12-06

## 2021-12-06 VITALS
SYSTOLIC BLOOD PRESSURE: 137 MMHG | HEART RATE: 65 BPM | DIASTOLIC BLOOD PRESSURE: 96 MMHG | TEMPERATURE: 98 F | RESPIRATION RATE: 18 BRPM | OXYGEN SATURATION: 99 %

## 2021-12-06 LAB — SARS-COV-2 RNA SPEC QL NAA+PROBE: DETECTED

## 2021-12-06 PROCEDURE — 99232 SBSQ HOSP IP/OBS MODERATE 35: CPT

## 2021-12-06 PROCEDURE — 99239 HOSP IP/OBS DSCHRG MGMT >30: CPT | Mod: GC

## 2021-12-06 RX ORDER — CHLORPROMAZINE HCL 10 MG
50 TABLET ORAL ONCE
Refills: 0 | Status: COMPLETED | OUTPATIENT
Start: 2021-12-06 | End: 2021-12-06

## 2021-12-06 RX ADMIN — Medication 100 MILLIGRAM(S): at 13:15

## 2021-12-06 RX ADMIN — QUETIAPINE FUMARATE 200 MILLIGRAM(S): 200 TABLET, FILM COATED ORAL at 13:16

## 2021-12-06 RX ADMIN — Medication 1 MILLIGRAM(S): at 05:18

## 2021-12-06 RX ADMIN — Medication 100 MILLIGRAM(S): at 05:17

## 2021-12-06 RX ADMIN — QUETIAPINE FUMARATE 50 MILLIGRAM(S): 200 TABLET, FILM COATED ORAL at 09:04

## 2021-12-06 RX ADMIN — Medication 50 MILLIGRAM(S): at 15:07

## 2021-12-06 RX ADMIN — Medication 15 MILLIGRAM(S): at 05:18

## 2021-12-06 RX ADMIN — Medication 50 MICROGRAM(S): at 05:18

## 2021-12-06 RX ADMIN — FINASTERIDE 5 MILLIGRAM(S): 5 TABLET, FILM COATED ORAL at 13:15

## 2021-12-06 RX ADMIN — PANTOPRAZOLE SODIUM 40 MILLIGRAM(S): 20 TABLET, DELAYED RELEASE ORAL at 06:43

## 2021-12-06 NOTE — BH CONSULTATION LIAISON PROGRESS NOTE - NSBHCHARTREVIEWVS_PSY_A_CORE FT
Vital Signs Last 24 Hrs  T(C): 36.4 (06 Dec 2021 13:55), Max: 36.7 (05 Dec 2021 21:08)  T(F): 97.6 (06 Dec 2021 13:55), Max: 98 (05 Dec 2021 21:08)  HR: 65 (06 Dec 2021 13:55) (65 - 89)  BP: 137/96 (06 Dec 2021 13:55) (118/77 - 145/89)  BP(mean): --  RR: 18 (06 Dec 2021 13:55) (18 - 18)  SpO2: 99% (06 Dec 2021 13:55) (96% - 99%)
Vital Signs Last 24 Hrs  T(C): 36.4 (03 Dec 2021 05:56), Max: 36.4 (03 Dec 2021 05:56)  T(F): 97.5 (03 Dec 2021 05:56), Max: 97.5 (03 Dec 2021 05:56)  HR: 71 (03 Dec 2021 05:56) (71 - 71)  BP: 95/64 (03 Dec 2021 05:56) (95/64 - 95/64)  BP(mean): --  RR: 18 (03 Dec 2021 05:56) (18 - 18)  SpO2: 95% (03 Dec 2021 05:56) (95% - 95%)

## 2021-12-06 NOTE — DISCHARGE NOTE PROVIDER - NSDCMRMEDTOKEN_GEN_ALL_CORE_FT
cephalexin 500 mg oral tablet: 1 tab(s) orally every 8 hours   cloNIDine 0.1 mg oral tablet: 2 tab(s) orally once a day (at bedtime)  Depakene 250 mg/5 mL oral liquid: 20 milliliter(s) orally 2 times a day  doxycycline hyclate 100 mg oral capsule: 1 cap(s) orally 2 times a day   finasteride 5 mg oral tablet: 1 tab(s) orally once a day  levothyroxine 50 mcg (0.05 mg) oral tablet: 1 tab(s) orally once a day  pantoprazole 20 mg oral delayed release tablet: 1 tab(s) orally once a day  phenazopyridine 200 mg oral tablet: 1 tab(s) orally every 8 hours  pravastatin 20 mg oral tablet: 1 tab(s) orally once a day  QUEtiapine 200 mg oral tablet: 1 tab(s) orally once a day  QUEtiapine 50 mg oral tablet: 1 tab(s) orally every 6 hours, As needed, severe agitaion  tamsulosin 0.4 mg oral capsule: 1 cap(s) orally once a day (at bedtime)  Vitamin D2 50,000 intl units (1.25 mg) oral capsule: 1 cap(s) orally once a week   cloNIDine 0.1 mg oral tablet: 2 tab(s) orally once a day (at bedtime)  Depakene 250 mg/5 mL oral liquid: 20 milliliter(s) orally 2 times a day  finasteride 5 mg oral tablet: 1 tab(s) orally once a day  levothyroxine 50 mcg (0.05 mg) oral tablet: 1 tab(s) orally once a day  pantoprazole 20 mg oral delayed release tablet: 1 tab(s) orally once a day  phenazopyridine 200 mg oral tablet: 1 tab(s) orally every 8 hours  pravastatin 20 mg oral tablet: 1 tab(s) orally once a day  QUEtiapine 200 mg oral tablet: 1 tab(s) orally once a day  QUEtiapine 50 mg oral tablet: 1 tab(s) orally every 6 hours, As needed, severe agitaion  tamsulosin 0.4 mg oral capsule: 1 cap(s) orally once a day (at bedtime)  Vitamin D2 50,000 intl units (1.25 mg) oral capsule: 1 cap(s) orally once a week

## 2021-12-06 NOTE — DISCHARGE NOTE PROVIDER - NSDCCAREPROVSEEN_GEN_ALL_CORE_FT
Mini, Umang Grover, Leon Sousa, Yamila Brown, Shahbaz Mini, Umang Grover, Leon Sousa, Yamila Brown, Shahbaz Dumas, Dariana

## 2021-12-06 NOTE — DISCHARGE NOTE NURSING/CASE MANAGEMENT/SOCIAL WORK - NSDCPEFALRISK_GEN_ALL_CORE
For information on Fall & Injury Prevention, visit: [email or web address] OR  [email or web address] OR  https://www.cdc.gov/steadi/patient.html

## 2021-12-06 NOTE — PROGRESS NOTE ADULT - REASON FOR ADMISSION
Chest pain, self injurious behavior
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
## 2021-12-06 NOTE — BH CONSULTATION LIAISON PROGRESS NOTE - NSBHFUPINTERVALHXFT_PSY_A_CORE
Pt seen in his room for f/u, found sitting up in bed awake and alert with PCA at bedside. Pt described mood as "fine" and denied SI/HI/AVH, but stated that he was still eager to gain access to his belongings. MD obtained belongings from [facility] and ID'd the following items as safe for pt to have with him: 2 smooth brass finger rings, 2 black T-shirts and a pair of black nylon pants. MD determined the following items should go back to security: Pt's winter jacket, sneakers and socks (not needed in the hospital), and several items of imitation brenda jewelry (watch, necklace and bracelet) which have sharp edges with potential for self-injury. MD returned to room and handed pt the permitted items. On MD's arrival, pt was engaged in writing down the number of his debit card so he can check his bank balance. PCA had pt's wallet, and was counting the bills and change at pt request. Pt then returned the debit card to the wallet, which MD gave to [facility] to be returned to security. Pt said he intended to put on his clothes. MD cautioned pt that he must where hospital socks while he is here; pt responded, "I know the rules."
Pt seen in his room for f/u, found standing by his bed awake and alert with PCA at bedside. Pt is dressed in his own pants, sneakers and hospital gown. As soon as MD arrives, pt announces, "I'm going home today" and begins making demands: "I need my shirts. Can you get me my shirts? The  said she could get me some shirts. Can you call the  and ask her to get the shirts?" MD asks for explanation, and pt reports that while he was wearing 2 black T-shirts which MD had allowed him to have from his belongings on 12/3, they had gotten wet due to episode of enuresis. Pt says SW agreed to find him a shirt and sweatshirt to wear home when he is DC'd today, but pt appeared quite fixated on the idea of getting the shirts right away. Pt also insisted that he had been hired as a  at this hospital, and would be starting next week: "The guard here, he told me they have my email. He told me I should come back next week to get the uniform and the badge." MD tried to reason with pt, telling him it would be a conflict of interest for a pt to work as a  here, but pt was adamant. Then MD suggested that perhaps pt "misunderstood" and that he does not in fact have a job offer here, but pt became verbally belligerent: "Are you calling me a liar? Are you saying I'm lying to you?" Given pt's escalating level of verbal agitation, MD told pt he needed a PRN and offered him a choice of PO or IM. Pt chose the PO, which was given. Later in day, MD learned that pt had been successfully DC'd back to his residence.

## 2021-12-06 NOTE — BH CONSULTATION LIAISON PROGRESS NOTE - CURRENT MEDICATION
MEDICATIONS  (STANDING):  atorvastatin 10 milliGRAM(s) Oral at bedtime  busPIRone 15 milliGRAM(s) Oral two times a day  chlorproMAZINE    Injectable 50 milliGRAM(s) IntraMuscular once  ergocalciferol 62919 Unit(s) Oral <User Schedule>  finasteride 5 milliGRAM(s) Oral daily  influenza   Vaccine 0.5 milliLiter(s) IntraMuscular once  levothyroxine 50 MICROGram(s) Oral daily  LORazepam     Tablet 1 milliGRAM(s) Oral two times a day  mirtazapine 15 milliGRAM(s) Oral at bedtime  pantoprazole    Tablet 40 milliGRAM(s) Oral before breakfast  QUEtiapine 200 milliGRAM(s) Oral daily  tamsulosin 0.4 milliGRAM(s) Oral at bedtime  topiramate 100 milliGRAM(s) Oral two times a day  traZODone 100 milliGRAM(s) Oral daily    MEDICATIONS  (PRN):  acetaminophen     Tablet .. 650 milliGRAM(s) Oral every 6 hours PRN Temp greater or equal to 38C (100.4F), Moderate Pain (4 - 6)  ALBUTerol    90 MICROgram(s) HFA Inhaler 2 Puff(s) Inhalation every 6 hours PRN Shortness of Breath and/or Wheezing  chlorproMAZINE    Injectable 50 milliGRAM(s) IntraMuscular every 6 hours PRN Severe Agitation  ketorolac   Injectable 15 milliGRAM(s) IV Push every 6 hours PRN Mild Pain (1 - 3)  ondansetron Injectable 4 milliGRAM(s) IV Push every 6 hours PRN Nausea and/or Vomiting  QUEtiapine 50 milliGRAM(s) Oral every 6 hours PRN aggitation  
MEDICATIONS  (STANDING):  atorvastatin 10 milliGRAM(s) Oral at bedtime  busPIRone 15 milliGRAM(s) Oral two times a day  chlorproMAZINE    Injectable 50 milliGRAM(s) IntraMuscular once  ergocalciferol 86613 Unit(s) Oral <User Schedule>  finasteride 5 milliGRAM(s) Oral daily  influenza   Vaccine 0.5 milliLiter(s) IntraMuscular once  levothyroxine 50 MICROGram(s) Oral daily  LORazepam     Tablet 1 milliGRAM(s) Oral two times a day  mirtazapine 15 milliGRAM(s) Oral at bedtime  pantoprazole    Tablet 40 milliGRAM(s) Oral before breakfast  QUEtiapine 200 milliGRAM(s) Oral daily  tamsulosin 0.4 milliGRAM(s) Oral at bedtime  topiramate 100 milliGRAM(s) Oral two times a day  traZODone 100 milliGRAM(s) Oral daily    MEDICATIONS  (PRN):  acetaminophen     Tablet .. 650 milliGRAM(s) Oral every 6 hours PRN Temp greater or equal to 38C (100.4F), Moderate Pain (4 - 6)  ALBUTerol    90 MICROgram(s) HFA Inhaler 2 Puff(s) Inhalation every 6 hours PRN Shortness of Breath and/or Wheezing  chlorproMAZINE    Injectable 50 milliGRAM(s) IntraMuscular every 6 hours PRN Severe Agitation  ketorolac   Injectable 15 milliGRAM(s) IV Push every 6 hours PRN Mild Pain (1 - 3)  ondansetron Injectable 4 milliGRAM(s) IV Push every 6 hours PRN Nausea and/or Vomiting  oxycodone    5 mG/acetaminophen 325 mG 1 Tablet(s) Oral every 6 hours PRN Severe Pain (7 - 10)  QUEtiapine 50 milliGRAM(s) Oral every 6 hours PRN aggitation
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
## 2021-12-06 NOTE — DISCHARGE NOTE PROVIDER - CARE PROVIDER_API CALL
Gus Birmingham (MD)  Family Medicine  [address] [place] [address]  Phone: [phone]  Fax: [phone]  Follow Up Time: 1 week

## 2021-12-06 NOTE — BH CONSULTATION LIAISON PROGRESS NOTE - NSBHCONSULTRECOMMENDOTHER_PSY_A_CORE FT
1. C/w home psych meds: BuSpar 15 mg BID, Ativan 1 mg BID, Seroquel 200 mg qd, Topamax 100 mg BID, Remeron 15 mg qhs, trazodone 100 mg qhs  2. For moderate agitation, c/w Seroquel 50 mg q6h PRN moderate agitation  3. For severe agitation, c/w Thorazine 50 mg PO or IM q6h PRN severe agitation  4. Medical management as directed by primary team  5. Psychiatry is signing off  6. Case d/w primary team    Dariana Dumas MD  Director, Consultation-Liaison Psychiatry Service  [id number]
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
## 2021-12-06 NOTE — DISCHARGE NOTE NURSING/CASE MANAGEMENT/SOCIAL WORK - NSDCVIVACCINE_GEN_ALL_CORE_FT
Tdap; 20-Dec-2018 12:21; Lo Anaya (RN); [employer]; [id number] (Exp. Date: 02-Nov-2020); IntraMuscular; Deltoid Left.; 0.5 milliLiter(s); VIS (VIS Published: 09-May-2013, VIS Presented: 20-Dec-2018);   Tdap; 26-Mar-2019 18:59; Shavon Barrios (RN); [employer]; [id number] (Exp. Date: 26-Feb-2021); IntraMuscular; Deltoid Left.; 0.5 milliLiter(s); VIS (VIS Published: 09-May-2013, VIS Presented: 26-Mar-2019);   Tdap; 01-Dec-2021 09:35; Zamzam Coulter (RN); [employer]; [id number] (Exp. Date: 09-Sep-2023); IntraMuscular; Deltoid Left.; 0.5 milliLiter(s); VIS (VIS Published: 09-May-2013, VIS Presented: 01-Dec-2021);
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
## 2021-12-06 NOTE — DISCHARGE NOTE NURSING/CASE MANAGEMENT/SOCIAL WORK - PATIENT PORTAL LINK FT
You can access the FollowMyHealth Patient Portal offered by [facility] by registering at the following website: [email or web address]. By joining [employer]’s FollowMyHealth portal, you will also be able to view your health information using other applications (apps) compatible with our system.
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
## 2021-12-17 ENCOUNTER — TRANSCRIPTION ENCOUNTER (OUTPATIENT)
Age: 31
End: 2021-12-17

## 2021-12-17 ENCOUNTER — EMERGENCY (EMERGENCY)
Facility: HOSPITAL | Age: 31
LOS: 1 days | Discharge: ANOTHER HOSPITAL | End: 2021-12-17
Attending: EMERGENCY MEDICINE
Payer: MEDICAID

## 2021-12-17 VITALS
TEMPERATURE: 98 F | HEIGHT: 71 IN | RESPIRATION RATE: 18 BRPM | HEART RATE: 60 BPM | SYSTOLIC BLOOD PRESSURE: 128 MMHG | DIASTOLIC BLOOD PRESSURE: 86 MMHG | WEIGHT: 220.02 LBS | OXYGEN SATURATION: 99 %

## 2021-12-17 LAB
ANION GAP SERPL CALC-SCNC: 3 MMOL/L — LOW (ref 5–17)
APAP SERPL-MCNC: 4 UG/ML — LOW (ref 10–30)
BASOPHILS # BLD AUTO: 0.06 K/UL — SIGNIFICANT CHANGE UP (ref 0–0.2)
BASOPHILS NFR BLD AUTO: 0.6 % — SIGNIFICANT CHANGE UP (ref 0–2)
BUN SERPL-MCNC: 16 MG/DL — SIGNIFICANT CHANGE UP (ref 7–18)
CALCIUM SERPL-MCNC: 8.9 MG/DL — SIGNIFICANT CHANGE UP (ref 8.4–10.5)
CHLORIDE SERPL-SCNC: 111 MMOL/L — HIGH (ref 96–108)
CO2 SERPL-SCNC: 25 MMOL/L — SIGNIFICANT CHANGE UP (ref 22–31)
CREAT SERPL-MCNC: 1.06 MG/DL — SIGNIFICANT CHANGE UP (ref 0.5–1.3)
EOSINOPHIL # BLD AUTO: 0.19 K/UL — SIGNIFICANT CHANGE UP (ref 0–0.5)
EOSINOPHIL NFR BLD AUTO: 2 % — SIGNIFICANT CHANGE UP (ref 0–6)
ETHANOL SERPL-MCNC: <3 MG/DL — SIGNIFICANT CHANGE UP (ref 0–10)
GLUCOSE SERPL-MCNC: 93 MG/DL — SIGNIFICANT CHANGE UP (ref 70–99)
HCT VFR BLD CALC: 44.9 % — SIGNIFICANT CHANGE UP (ref 39–50)
HGB BLD-MCNC: 13.4 G/DL — SIGNIFICANT CHANGE UP (ref 13–17)
IMM GRANULOCYTES NFR BLD AUTO: 0.2 % — SIGNIFICANT CHANGE UP (ref 0–1.5)
LYMPHOCYTES # BLD AUTO: 3.11 K/UL — SIGNIFICANT CHANGE UP (ref 1–3.3)
LYMPHOCYTES # BLD AUTO: 33.1 % — SIGNIFICANT CHANGE UP (ref 13–44)
MCHC RBC-ENTMCNC: 22.3 PG — LOW (ref 27–34)
MCHC RBC-ENTMCNC: 29.8 GM/DL — LOW (ref 32–36)
MCV RBC AUTO: 74.6 FL — LOW (ref 80–100)
MONOCYTES # BLD AUTO: 0.47 K/UL — SIGNIFICANT CHANGE UP (ref 0–0.9)
MONOCYTES NFR BLD AUTO: 5 % — SIGNIFICANT CHANGE UP (ref 2–14)
NEUTROPHILS # BLD AUTO: 5.55 K/UL — SIGNIFICANT CHANGE UP (ref 1.8–7.4)
NEUTROPHILS NFR BLD AUTO: 59.1 % — SIGNIFICANT CHANGE UP (ref 43–77)
NRBC # BLD: 0 /100 WBCS — SIGNIFICANT CHANGE UP (ref 0–0)
PLATELET # BLD AUTO: 389 K/UL — SIGNIFICANT CHANGE UP (ref 150–400)
POTASSIUM SERPL-MCNC: 4.9 MMOL/L — SIGNIFICANT CHANGE UP (ref 3.5–5.3)
POTASSIUM SERPL-SCNC: 4.9 MMOL/L — SIGNIFICANT CHANGE UP (ref 3.5–5.3)
RBC # BLD: 6.02 M/UL — HIGH (ref 4.2–5.8)
RBC # FLD: 19.5 % — HIGH (ref 10.3–14.5)
SALICYLATES SERPL-MCNC: <1.7 MG/DL — LOW (ref 2.8–20)
SARS-COV-2 RNA SPEC QL NAA+PROBE: SIGNIFICANT CHANGE UP
SODIUM SERPL-SCNC: 139 MMOL/L — SIGNIFICANT CHANGE UP (ref 135–145)
WBC # BLD: 9.4 K/UL — SIGNIFICANT CHANGE UP (ref 3.8–10.5)
WBC # FLD AUTO: 9.4 K/UL — SIGNIFICANT CHANGE UP (ref 3.8–10.5)

## 2021-12-17 PROCEDURE — 90792 PSYCH DIAG EVAL W/MED SRVCS: CPT | Mod: 95

## 2021-12-17 PROCEDURE — 99285 EMERGENCY DEPT VISIT HI MDM: CPT

## 2021-12-17 RX ORDER — ACETAMINOPHEN 500 MG
650 TABLET ORAL ONCE
Refills: 0 | Status: COMPLETED | OUTPATIENT
Start: 2021-12-17 | End: 2021-12-17

## 2021-12-17 RX ADMIN — Medication 650 MILLIGRAM(S): at 19:00

## 2021-12-17 RX ADMIN — Medication 650 MILLIGRAM(S): at 17:58
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
## 2021-12-17 NOTE — ED BEHAVIORAL HEALTH ASSESSMENT NOTE - RISK ASSESSMENT
Moderate Acute Suicide Risk Risk factors: Intellectual disability, ASD and ADHD, h/o NSSIB, poor frustration tolerance. Protective factors: Absent h/o SI/SA, stable domicile in respite with 1:1 observation, supportive family, help seeking, engaged in care and taking medications. Acute risk of suicide appears low at the time of assessment, but chronic risk may be mildly elevated due to above risk factors. Acute and chronic risk of self-injury appear moderate to high. Acute risk to others to be ascertained upon clarification of statements/behaviors towards staff when collateral is available.

## 2021-12-17 NOTE — ED BEHAVIORAL HEALTH NOTE - BEHAVIORAL HEALTH NOTE
Consult requested by EM Attending Dr. Reyna at 20:54. Telepsychiatry attempted to consult at 22:25 but unable to initiate due to cart not being set up. ED staff educated to notify Telepsychiatry once set up.
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
## 2021-12-17 NOTE — ED ADULT NURSE NOTE - NSIMPLEMENTINTERV_GEN_ALL_ED
Implemented All Fall Risk Interventions:  [place] to call system. Call bell, personal items and telephone within reach. Instruct patient to call for assistance. Room bathroom lighting operational. Non-slip footwear when patient is off stretcher. Physically safe environment: no spills, clutter or unnecessary equipment. Stretcher in lowest position, wheels locked, appropriate side rails in place. Provide visual cue, wrist band, yellow gown, etc. Monitor gait and stability. Monitor for mental status changes and reorient to person, place, and time. Review medications for side effects contributing to fall risk. Reinforce activity limits and safety measures with patient and family.
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
## 2021-12-17 NOTE — ED BEHAVIORAL HEALTH ASSESSMENT NOTE - PSYCHIATRIC ISSUES AND PLAN (INCLUDE STANDING AND PRN MEDICATION)
continue home meds (tonight pt should receive Seroquel 200mg, and continue seroquel 50mg PO q6h PRN agitation). Should pt require IM, has reportedly responded well to thorazine 50mg IM PRNs per chart.
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
## 2021-12-17 NOTE — ED ADULT TRIAGE NOTE - CHIEF COMPLAINT QUOTE
BIBA, sending from group home, fall to left arm, c/o pain, full ROM, bruises to left arm, denied LOC,

## 2021-12-17 NOTE — ED PROVIDER NOTE - NSFOLLOWUPINSTRUCTIONS_ED_ALL_ED_FT
CONTUSION IN ADULTS - AfterCare(R) Instructions(ER/ED)           Contusion in Adults    WHAT YOU NEED TO KNOW:    A contusion is a bruise that appears on your skin after an injury. A bruise happens when small blood vessels tear but skin does not. Blood leaks into nearby tissue, such as soft tissue or muscle.    DISCHARGE INSTRUCTIONS:    Return to the emergency department if:   •You have new trouble moving the injured area.      •You have tingling or numbness in or near the injured area.      •Your hand or foot below the bruise gets cold or turns pale.       Call your doctor if:   •You find a new lump in the injured area.      •Your symptoms do not improve with treatment after 4 to 5 days.      •You have questions or concerns about your condition or care.      Medicines: You may need any of the following:   •NSAIDs help decrease swelling and pain or fever. This medicine is available with or without a doctor's order. NSAIDs can cause stomach bleeding or kidney problems in certain people. If you take blood thinner medicine, always ask your healthcare provider if NSAIDs are safe for you. Always read the medicine label and follow directions.      •Prescription pain medicine may be given. Ask your healthcare provider how to take this medicine safely. Some prescription pain medicines contain acetaminophen. Do not take other medicines that contain acetaminophen without talking to your healthcare provider. Too much acetaminophen may cause liver damage. Prescription pain medicine may cause constipation. Ask your healthcare provider how to prevent or treat constipation.       •Take your medicine as directed. Contact your healthcare provider if you think your medicine is not helping or if you have side effects. Tell him of her if you are allergic to any medicine. Keep a list of the medicines, vitamins, and herbs you take. Include the amounts, and when and why you take them. Bring the list or the pill bottles to follow-up visits. Carry your medicine list with you in case of an emergency.      Help a contusion heal:   •Rest the injured area or use it less than usual. If you bruised your leg or foot, you may need crutches or a cane to help you walk. This will help you keep weight off your injured body part.       •Apply ice to decrease swelling and pain. Ice may also help prevent tissue damage. Use an ice pack, or put crushed ice in a plastic bag. Cover it with a towel and place it on your bruise for 15 to 20 minutes every hour or as directed.      •Use compression to support the area and decrease swelling. Wrap an elastic bandage around the area over the bruised muscle. Make sure the bandage is not too tight. You should be able to fit 1 finger between the bandage and your skin.      •Elevate (raise) your injured body part above the level of your heart to help decrease pain and swelling. Use pillows, blankets, or rolled towels to elevate the area as often as you can.      •Do not drink alcohol as directed. Alcohol may slow healing.      •Do not stretch injured muscles right after your injury. Ask your healthcare provider when and how you may safely stretch after your injury. Gentle stretches can help increase your flexibility.      •Do not massage the area or put heating pads on the bruise right after your injury. Heat and massage may slow healing. Your healthcare provider may tell you to apply heat after several days. At that time, heat will start to help the injury heal.      Prevent another contusion:   •Stretch and warm up before you play sports or exercise.      •Wear protective gear when you play sports. Examples are shin guards and padding.       •If you begin a new physical activity, start slowly to give your body a chance to adjust.      Follow up with your doctor as directed: Write down your questions so you remember to ask them during your visits.       © Copyright [employer] 2021           back to top                          © Copyright [employer] 2021

## 2021-12-17 NOTE — ED PROVIDER NOTE - PATIENT PORTAL LINK FT
You can access the FollowMyHealth Patient Portal offered by [facility] by registering at the following website: [email or web address]. By joining [employer]’s FollowMyHealth portal, you will also be able to view your health information using other applications (apps) compatible with our system.

## 2021-12-17 NOTE — ED BEHAVIORAL HEALTH ASSESSMENT NOTE - CURRENT MEDICATION
MEDICATIONS  (STANDING):  atorvastatin 10 milliGRAM(s) Oral at bedtime  busPIRone 15 milliGRAM(s) Oral two times a day  chlorproMAZINE    Injectable 50 milliGRAM(s) IntraMuscular once  ergocalciferol 10779 Unit(s) Oral <User Schedule>  finasteride 5 milliGRAM(s) Oral daily  influenza   Vaccine 0.5 milliLiter(s) IntraMuscular once  levothyroxine 50 MICROGram(s) Oral daily  LORazepam     Tablet 1 milliGRAM(s) Oral two times a day  mirtazapine 15 milliGRAM(s) Oral at bedtime  pantoprazole    Tablet 40 milliGRAM(s) Oral before breakfast  QUEtiapine 200 milliGRAM(s) Oral daily  tamsulosin 0.4 milliGRAM(s) Oral at bedtime  topiramate 100 milliGRAM(s) Oral two times a day  traZODone 100 milliGRAM(s) Oral daily    MEDICATIONS  (PRN):  acetaminophen     Tablet .. 650 milliGRAM(s) Oral every 6 hours PRN Temp greater or equal to 38C (100.4F), Moderate Pain (4 - 6)  ALBUTerol    90 MICROgram(s) HFA Inhaler 2 Puff(s) Inhalation every 6 hours PRN Shortness of Breath and/or Wheezing  chlorproMAZINE    Injectable 50 milliGRAM(s) IntraMuscular every 6 hours PRN Severe Agitation  ketorolac   Injectable 15 milliGRAM(s) IV Push every 6 hours PRN Mild Pain (1 - 3)  ondansetron Injectable 4 milliGRAM(s) IV Push every 6 hours PRN Nausea and/or Vomiting  QUEtiapine 50 milliGRAM(s) Oral every 6 hours PRN aggitation

## 2021-12-17 NOTE — ED BEHAVIORAL HEALTH ASSESSMENT NOTE - DESCRIPTION
Generally calm, did not require PRNs/restraints see HPI Born in [place], raised in [place]. Parents are no longer together; mother lives in [place] and works as dialysis technician. Has sister who lives in [place]. Previously living in [facility] group home where he had GF (fellow resident), but relationship ended >2 yrs ago. Living in respite [facility] since 1/2021 due to breakup, conflicts with fellow residents.

## 2021-12-17 NOTE — ED BEHAVIORAL HEALTH ASSESSMENT NOTE - SUBSTANCE ISSUES AND PLAN (INCLUDE STANDING AND PRN MEDICATION)
Patient given braces for use during working.   Recommend 2-3 weeks of consistent use of NSAID's.   Follow up PRN.   
none

## 2021-12-18 DIAGNOSIS — F41.1 GENERALIZED ANXIETY DISORDER: ICD-10-CM

## 2021-12-18 DIAGNOSIS — F25.0 SCHIZOAFFECTIVE DISORDER, BIPOLAR TYPE: ICD-10-CM

## 2021-12-18 DIAGNOSIS — F43.10 POST-TRAUMATIC STRESS DISORDER, UNSPECIFIED: ICD-10-CM

## 2021-12-18 DIAGNOSIS — F84.0 AUTISTIC DISORDER: ICD-10-CM

## 2021-12-18 DIAGNOSIS — F79 UNSPECIFIED INTELLECTUAL DISABILITIES: ICD-10-CM

## 2021-12-18 LAB
APPEARANCE UR: CLEAR — SIGNIFICANT CHANGE UP
BILIRUB UR-MCNC: NEGATIVE — SIGNIFICANT CHANGE UP
COLOR SPEC: YELLOW — SIGNIFICANT CHANGE UP
DIFF PNL FLD: NEGATIVE — SIGNIFICANT CHANGE UP
GLUCOSE UR QL: NEGATIVE — SIGNIFICANT CHANGE UP
KETONES UR-MCNC: NEGATIVE — SIGNIFICANT CHANGE UP
LEUKOCYTE ESTERASE UR-ACNC: NEGATIVE — SIGNIFICANT CHANGE UP
NITRITE UR-MCNC: NEGATIVE — SIGNIFICANT CHANGE UP
PCP SPEC-MCNC: SIGNIFICANT CHANGE UP
PH UR: 7 — SIGNIFICANT CHANGE UP (ref 5–8)
PROT UR-MCNC: NEGATIVE — SIGNIFICANT CHANGE UP
SP GR SPEC: 1.01 — SIGNIFICANT CHANGE UP (ref 1.01–1.02)
UROBILINOGEN FLD QL: NEGATIVE — SIGNIFICANT CHANGE UP

## 2021-12-18 PROCEDURE — 99285 EMERGENCY DEPT VISIT HI MDM: CPT

## 2021-12-18 PROCEDURE — 36415 COLL VENOUS BLD VENIPUNCTURE: CPT

## 2021-12-18 PROCEDURE — 87635 SARS-COV-2 COVID-19 AMP PRB: CPT

## 2021-12-18 PROCEDURE — 80307 DRUG TEST PRSMV CHEM ANLYZR: CPT

## 2021-12-18 PROCEDURE — 93005 ELECTROCARDIOGRAM TRACING: CPT

## 2021-12-18 PROCEDURE — 99215 OFFICE O/P EST HI 40 MIN: CPT | Mod: 95

## 2021-12-18 PROCEDURE — 81003 URINALYSIS AUTO W/O SCOPE: CPT

## 2021-12-18 PROCEDURE — 80048 BASIC METABOLIC PNL TOTAL CA: CPT

## 2021-12-18 PROCEDURE — 85025 COMPLETE CBC W/AUTO DIFF WBC: CPT

## 2021-12-18 RX ORDER — PANTOPRAZOLE SODIUM 20 MG/1
40 TABLET, DELAYED RELEASE ORAL ONCE
Refills: 0 | Status: COMPLETED | OUTPATIENT
Start: 2021-12-18 | End: 2021-12-18

## 2021-12-18 RX ORDER — MIRTAZAPINE 45 MG/1
15 TABLET, ORALLY DISINTEGRATING ORAL ONCE
Refills: 0 | Status: COMPLETED | OUTPATIENT
Start: 2021-12-18 | End: 2021-12-18

## 2021-12-18 RX ORDER — QUETIAPINE FUMARATE 200 MG/1
200 TABLET, FILM COATED ORAL ONCE
Refills: 0 | Status: COMPLETED | OUTPATIENT
Start: 2021-12-18 | End: 2021-12-18

## 2021-12-18 RX ORDER — TOPIRAMATE 25 MG
100 TABLET ORAL ONCE
Refills: 0 | Status: COMPLETED | OUTPATIENT
Start: 2021-12-18 | End: 2021-12-18

## 2021-12-18 RX ADMIN — Medication 15 MILLIGRAM(S): at 20:17

## 2021-12-18 RX ADMIN — MIRTAZAPINE 15 MILLIGRAM(S): 45 TABLET, ORALLY DISINTEGRATING ORAL at 02:02

## 2021-12-18 RX ADMIN — Medication 1 MILLIGRAM(S): at 19:56

## 2021-12-18 RX ADMIN — PANTOPRAZOLE SODIUM 40 MILLIGRAM(S): 20 TABLET, DELAYED RELEASE ORAL at 19:56

## 2021-12-18 RX ADMIN — Medication 15 MILLIGRAM(S): at 02:02

## 2021-12-18 RX ADMIN — Medication 100 MILLIGRAM(S): at 02:02

## 2021-12-18 RX ADMIN — Medication 100 MILLIGRAM(S): at 20:17

## 2021-12-18 RX ADMIN — QUETIAPINE FUMARATE 200 MILLIGRAM(S): 200 TABLET, FILM COATED ORAL at 02:02

## 2021-12-18 NOTE — PROGRESS NOTE BEHAVIORAL HEALTH - NSBHCONSULTMEDAGITATION_PSY_A_CORE FT
-Recommending Thorazine 50 mg q6 PO/IM PRN agitation, Ativan 2mg q6 PO/IM PRN agitation, Benadryl 50 mg PO/IM q6 PRN agitation

## 2021-12-18 NOTE — PROGRESS NOTE BEHAVIORAL HEALTH - NSBHFUPINTERVALHXFT_PSY_A_CORE
Patient seen and examined again on reassessment this morning. Chart reviewed. Case discussed with EM provider. Patient received his evening medications overnight. Patient did not reportedly sleep well last night and is reported decreased appetite (has not eaten per report). He is saying "I stabbed myself with my keys yesterday because I was feeling bad." Endorses SI this morning without clear plan or intent at this time. Also having "bad thoughts" about hurting staff members in his group home. Endorses AH of "voices telling to me to hurt myself".     Collateral obtained from patient's  at group home, Candy, who knows patient very well. She has concerns for patient's safety and is advocating for psychiatric admission at this time due to a change in his baseline behavior (e.g. threatening to hurt her, which is not typical for patient). She is also able to clarify patient's PPH including multiple psychiatric hospitalizations and psychiatric diagnoses (schizoaffective disorder, PTSD, HAVEN, high functioning ASD and ID). Please see [facility] note for full details. Patient seen and examined again on reassessment this morning. Chart reviewed. Case discussed with EM provider. Patient received his evening medications overnight. Patient did not reportedly sleep well last night and is reported decreased appetite (has not eaten per report). He is saying "I stabbed myself with my keys yesterday because I was feeling bad." Endorses SI this morning without clear plan or intent at this time. Also having "bad thoughts" about hurting staff members in his group home. Endorses AH of "voices telling to me to hurt myself".     Collateral obtained from patient's  at group home, Candy, who knows patient very well. She has concerns for patient's safety and is advocating for psychiatric admission at this time due to a change in his baseline behavior (e.g. threatening to hurt her, which is not typical for patient). She is also able to clarify patient's PPH including multiple psychiatric hospitalizations (last psychiatrically hospitalized at [facility] in November 2021, history of psychiatric diagnoses including, schizoaffective disorder, HAVEN, PTSD, ADHD, ID, and high functioning ASD. Please see [facility] note for full details.

## 2021-12-18 NOTE — PROGRESS NOTE BEHAVIORAL HEALTH - NSBHCONSULTFOLLOWDETAILS_PSY_A_CORE FT
Patient is not safe for discharge from ED. He requires inpatient psychiatric hospitalization for safety and stabilization at this time (9.27 status); bed search is underway

## 2021-12-18 NOTE — CHART NOTE - NSCHARTNOTEFT_GEN_A_CORE
Warm Handoff received from On Call martha for possible transfer to [facility] for Inpt psych.      According to On call martha, [facility] admitting dept was closed () and would not be opened until Monday 12/20/21 . Bharati made  ED provider aware.  After couple of hours, ED Provider informed martha that  Tele psych finally found a bed for Pt at [facility] for In pt psych.     Pt screened positive for sbirt . Martha met with Pt at bedside re positive sbirt consult. Pt appeared alert, he was sitting at the edge of the bed with flat affect and did not respond to martha 's greeting nor acknowledged her presence. Whereby, sbirt screening could not be completed with Pt.     Sbirt screen completed in sunrise.
Warm Handoff received from On Call martha for possible transfer to [facility] for Inpt psych.      According to On call martha, [facility] admitting dept was closed () and would not be opened until Monday 12/20/21 . Bharati made  ED provider aware.  After couple of hours, ED Provider informed martha that  Tele psych finally found a bed for Pt at [facility] for In pt psych.     Pt screened positive for sbirt . Martha met with Pt at bedside re positive sbirt consult. Pt appeared alert, he was sitting at the edge of the bed with flat affect and did not respond to martha 's greeting nor acknowledge her presence. Whereby, sbirt screening could not be completed with Pt.     Sbirt screen completed in sunrise.

## 2021-12-18 NOTE — PROGRESS NOTE BEHAVIORAL HEALTH - SUMMARY
Markus is a 31 year-old M, single, living in [facility] group home (in own apartment with 1:1) since 1/2021, with PPH schizoaffective disorder, HAVEN, PTSD, ADHD, ID, and high functioning ASD, h/o self-injury (cutting, head banging), no known SAs, BIBEMS activated by self at  after fall/arm injury. Pt was medically evaluated/cleared for discharge, when  contacted ED staff to inform that pt had made suicidal and homicidal threats, prompting this evaluation. On re-assessment this morning and after obtaining concerning collateral information on patient, he is endorsing SI and HI and has been demonstrating behaviors that are significantly off from his baseline. He is unsafe for discharge at this time and will require psychiatric hospitalization for safety, stabilization, and aftercare planning. Markus is a 31 year-old M, single, living in [facility] group home (in own apartment with 1:1) since 1/2021, with PPH schizoaffective disorder, HAVEN, PTSD, ADHD, ID, and high functioning ASD, h/o self-injury (cutting, head banging), no known SAs, h/o multiple psychiatric hospitalizations (last at [facility] in November 2021), PMH asthma, GERD, and hypothyroidism, BIBEMS activated by self at  after fall/arm injury. Pt was medically evaluated/cleared for discharge, when  contacted ED staff to inform that pt had made suicidal and homicidal threats, prompting this evaluation. On re-assessment this morning and after obtaining concerning collateral information on patient, he is endorsing SI and HI and has been demonstrating behaviors that are significantly off from his baseline. He is unsafe for discharge at this time and will require psychiatric hospitalization for safety, stabilization, and aftercare planning.

## 2021-12-18 NOTE — PROGRESS NOTE BEHAVIORAL HEALTH - NSBHCONSULTMEDS_PSY_A_CORE FT
Continue patient's home medications. Medications were confirmed from Candy, staff member from whom collateral was obtained.    -Continue guanfacine 1 mg BID  -Continue lorazepam 1 mg BID  -Continue topiramate 100 mg BID  -Continue buspirone 15 mg BID  -Continue mirtazepine 15 mg qHS    Also plan to continue patient's non-psychiatric home medications:  -Continue levothyroxine 50 mcg daily  -Continue pravastatin 20 mg daily  -Continue albuterol inhaler q6 PRN wheezing  -Continue pantoprazole 20 mg daily  -Continue acetaminophen 650 mg q8 PRN pain

## 2021-12-18 NOTE — PROGRESS NOTE BEHAVIORAL HEALTH - NSBHFUPDXUPDATEFT_PSY_A_CORE
additional collateral obtained this morning elucidating patient's past psychiatric and medical history and change from baseline in his behaviors; collateral obtained from staff at group home (see [facility] note for details)
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
## 2021-12-18 NOTE — PROGRESS NOTE BEHAVIORAL HEALTH - RISK ASSESSMENT
FULL NOTE TO FOLLOW Modifiable risk factors: ongoing SI and HI; change in patient's baseline behaviors as per staff who know him well at [facility]; inability to currently safety plan in the ED  Unmodifiable risk factors: history of psychiatric hospitalization; history of psychotic and mood disorders; history of NSSIB  Protective factors: No past SA; no co-morbid substance use; domiciled status at [facility]; connected to care

## 2021-12-19 ENCOUNTER — INPATIENT (INPATIENT)
Facility: HOSPITAL | Age: 31
LOS: 3 days | Discharge: NURSING, REHAB OR LONG-TERM CARE | End: 2021-12-23
Attending: PSYCHIATRY & NEUROLOGY | Admitting: PSYCHIATRY & NEUROLOGY
Payer: MEDICAID

## 2021-12-19 VITALS
OXYGEN SATURATION: 100 % | HEART RATE: 63 BPM | SYSTOLIC BLOOD PRESSURE: 95 MMHG | DIASTOLIC BLOOD PRESSURE: 64 MMHG | TEMPERATURE: 98 F | RESPIRATION RATE: 20 BRPM

## 2021-12-19 VITALS
WEIGHT: 220.02 LBS | DIASTOLIC BLOOD PRESSURE: 82 MMHG | HEIGHT: 71 IN | TEMPERATURE: 97 F | SYSTOLIC BLOOD PRESSURE: 137 MMHG | RESPIRATION RATE: 16 BRPM | HEART RATE: 62 BPM

## 2021-12-19 PROCEDURE — 93010 ELECTROCARDIOGRAM REPORT: CPT

## 2021-12-19 RX ORDER — GUANFACINE 3 MG/1
2 TABLET, EXTENDED RELEASE ORAL AT BEDTIME
Refills: 0 | Status: DISCONTINUED | OUTPATIENT
Start: 2021-12-19 | End: 2021-12-20

## 2021-12-19 RX ORDER — PANTOPRAZOLE SODIUM 20 MG/1
40 TABLET, DELAYED RELEASE ORAL
Refills: 0 | Status: DISCONTINUED | OUTPATIENT
Start: 2021-12-19 | End: 2021-12-23

## 2021-12-19 RX ORDER — MIRTAZAPINE 45 MG/1
15 TABLET, ORALLY DISINTEGRATING ORAL AT BEDTIME
Refills: 0 | Status: DISCONTINUED | OUTPATIENT
Start: 2021-12-19 | End: 2021-12-23

## 2021-12-19 RX ORDER — ACETAMINOPHEN 500 MG
650 TABLET ORAL EVERY 6 HOURS
Refills: 0 | Status: DISCONTINUED | OUTPATIENT
Start: 2021-12-19 | End: 2021-12-23

## 2021-12-19 RX ORDER — LEVOTHYROXINE SODIUM 125 MCG
50 TABLET ORAL DAILY
Refills: 0 | Status: DISCONTINUED | OUTPATIENT
Start: 2021-12-19 | End: 2021-12-20

## 2021-12-19 RX ORDER — DIPHENHYDRAMINE HCL 50 MG
50 CAPSULE ORAL ONCE
Refills: 0 | Status: COMPLETED | OUTPATIENT
Start: 2021-12-19 | End: 2021-12-19

## 2021-12-19 RX ORDER — ATORVASTATIN CALCIUM 80 MG/1
10 TABLET, FILM COATED ORAL AT BEDTIME
Refills: 0 | Status: DISCONTINUED | OUTPATIENT
Start: 2021-12-19 | End: 2021-12-23

## 2021-12-19 RX ORDER — HYDROXYZINE HCL 10 MG
50 TABLET ORAL EVERY 6 HOURS
Refills: 0 | Status: DISCONTINUED | OUTPATIENT
Start: 2021-12-19 | End: 2021-12-20

## 2021-12-19 RX ORDER — CHLORPROMAZINE HCL 10 MG
50 TABLET ORAL EVERY 6 HOURS
Refills: 0 | Status: DISCONTINUED | OUTPATIENT
Start: 2021-12-19 | End: 2021-12-20

## 2021-12-19 RX ORDER — CHLORPROMAZINE HCL 10 MG
50 TABLET ORAL EVERY 4 HOURS
Refills: 0 | Status: DISCONTINUED | OUTPATIENT
Start: 2021-12-19 | End: 2021-12-20

## 2021-12-19 RX ORDER — NICOTINE POLACRILEX 2 MG
1 GUM BUCCAL DAILY
Refills: 0 | Status: DISCONTINUED | OUTPATIENT
Start: 2021-12-19 | End: 2021-12-23

## 2021-12-19 RX ORDER — ALBUTEROL 90 UG/1
2 AEROSOL, METERED ORAL EVERY 6 HOURS
Refills: 0 | Status: DISCONTINUED | OUTPATIENT
Start: 2021-12-19 | End: 2021-12-23

## 2021-12-19 RX ORDER — TOPIRAMATE 25 MG
100 TABLET ORAL
Refills: 0 | Status: DISCONTINUED | OUTPATIENT
Start: 2021-12-19 | End: 2021-12-20

## 2021-12-19 RX ADMIN — PANTOPRAZOLE SODIUM 40 MILLIGRAM(S): 20 TABLET, DELAYED RELEASE ORAL at 07:49

## 2021-12-19 RX ADMIN — Medication 650 MILLIGRAM(S): at 18:31

## 2021-12-19 RX ADMIN — Medication 650 MILLIGRAM(S): at 14:57

## 2021-12-19 RX ADMIN — Medication 650 MILLIGRAM(S): at 15:24

## 2021-12-19 RX ADMIN — Medication 1 MILLIGRAM(S): at 23:23

## 2021-12-19 RX ADMIN — Medication 2 MILLIGRAM(S): at 14:56

## 2021-12-19 RX ADMIN — Medication 100 MILLIGRAM(S): at 23:21

## 2021-12-19 RX ADMIN — Medication 650 MILLIGRAM(S): at 18:02

## 2021-12-19 RX ADMIN — Medication 50 MILLIGRAM(S): at 17:07

## 2021-12-19 RX ADMIN — MIRTAZAPINE 15 MILLIGRAM(S): 45 TABLET, ORALLY DISINTEGRATING ORAL at 23:20

## 2021-12-19 RX ADMIN — ATORVASTATIN CALCIUM 10 MILLIGRAM(S): 80 TABLET, FILM COATED ORAL at 23:21

## 2021-12-19 RX ADMIN — Medication 15 MILLIGRAM(S): at 23:20
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
## 2021-12-19 NOTE — BH SAFETY PLAN - WARNING SIGN 1
One warning sign of my stresses would be that I would have mood swings. One warning sign of my stresses would be that I would have mood swings, I would start to yell and cursing.

## 2021-12-19 NOTE — H&P ADULT - NSHPPHYSICALEXAM_GEN_ALL_CORE
GENERAL:  30y/o Male NAD, resting comfortably.  HEAD:  Atraumatic, Normocephalic  EYES: EOMI, PERRLA, conjunctiva and sclera clear  NECK: Supple, No JVD, no cervical lymphadenopathy, non-tender  CHEST/LUNG: Clear to auscultation bilaterally; No wheeze, rhonchi, or rales  HEART: Regular rate and rhythm; S1&S2  ABDOMEN: Soft, Nontender, Nondistended x 4 quadrants; Bowel sounds present  EXTREMITIES:   Peripheral Pulses Present, No clubbing, no cyanosis, or no edema, no calf tenderness  PSYCH: AAOx3, cooperative, appropriate  NEUROLOGY: WNL  SKIN: WNL

## 2021-12-19 NOTE — PATIENT PROFILE BEHAVIORAL HEALTH - FLU SEASON?
"Pt to room 7 after imaging of right foot and ankle. Pt states that she was walking in living room, which is sunken, and \"rolled\" her right foot. Pt has swelling to lateral aspect of right foot. Pt declines anything for pain or an ice pack. CMS to foot intact.  " Yes...

## 2021-12-19 NOTE — BH SAFETY PLAN - WARNING SIGN 3
Also another warning sign of my stresses would be that my eating habits will change. Also another warning sign of my stresses would be that I would get angry.

## 2021-12-19 NOTE — BH SAFETY PLAN - INTERNAL COPING STRATEGY 5
Also sometimes going in my rocking chair can help me calm down. Also sometimes cleaning my room is a good thing for me.
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
## 2021-12-19 NOTE — PATIENT PROFILE BEHAVIORAL HEALTH - FALL HARM RISK - HARM RISK INTERVENTIONS

## 2021-12-19 NOTE — BH SAFETY PLAN - INTERNAL COPING STRATEGY 2
Another way I would cope with my stresses would be to listen to some music. Another way I would cope with my stresses would be to write up contracts and try to follow them.

## 2021-12-19 NOTE — BH SAFETY PLAN - STEP 6 SAFE ENVIRONMENT
Discussed with group home importance of removing sharps and pills in environment is one way I would be able to make my environment safe.  Discussed with my staff importance of removing sharps objects in environment is one way I would be able to make my environment safe.

## 2021-12-19 NOTE — BH SAFETY PLAN - THE ONE THING THAT IS MOST IMPORTANT TO ME AND WORTH LIVING FOR IS:
The one thing that is most important to me and worth living for would be my life.  The one thing that is most important to me and worth living for would be to get the new pay-station with 6 games.

## 2021-12-19 NOTE — H&P ADULT - NSICDXPASTMEDICALHX_GEN_ALL_CORE_FT
PAST MEDICAL HISTORY:  Asthma     Autism     Enuresis     Factitious disorder     GERD (gastroesophageal reflux disease)     History of BPH     Hypothyroid     Hypothyroidism     Intellectual disability     Mood disorder     Myopia of both eyes     Urinary retention      PAST MEDICAL HISTORY:  Asthma     Autism     Dyslipidemia     Enuresis     Factitious disorder     GERD (gastroesophageal reflux disease)     History of BPH     Hypothyroid     Hypothyroidism     Intellectual disability     Mood disorder     Myopia of both eyes     Urinary retention

## 2021-12-19 NOTE — H&P ADULT - ASSESSMENT
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 Pt was medically evaluated/cleared for discharge, when  contacted ED staff to inform that pt had made homicidal threats towards staff members, prompting this evaluation.     dx suicidal/ homocidal: plan as per psychiatrist. 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 Pt was medically evaluated/cleared for discharge, when  contacted ED staff to inform that pt had made homicidal threats towards staff members, prompting this evaluation.     dx suicidal/ homocidal: plan as per psychiatrist. Meds as per PMD                                     on Quetiapine op                                      consider 1 to 1                                     prn agitation    etoh abuse : detox    nicotine addiction: nicotine patch    bph:  flomax  dyslipidemia: statin    hypothyroid: synthroid    htn : clonidine    gerd: protonix

## 2021-12-19 NOTE — H&P ADULT - NSHPLABSRESULTS_GEN_ALL_CORE
13.4   9.40  )-----------( 389      ( 17 Dec 2021 19:04 )             44.9       12-17    139  |  111<H>  |  16  ----------------------------<  93  4.9   |  25  |  1.06    Ca    8.9      17 Dec 2021 19:04                Urinalysis Basic - ( 18 Dec 2021 12:41 )    Color: Yellow / Appearance: Clear / S.015 / pH: x  Gluc: x / Ketone: Negative  / Bili: Negative / Urobili: Negative   Blood: x / Protein: Negative / Nitrite: Negative   Leuk Esterase: Negative / RBC: x / WBC x   Sq Epi: x / Non Sq Epi: x / Bacteria: x            Lactate Trend            CAPILLARY BLOOD GLUCOSE

## 2021-12-20 DIAGNOSIS — F84.0 AUTISTIC DISORDER: ICD-10-CM

## 2021-12-20 DIAGNOSIS — F79 UNSPECIFIED INTELLECTUAL DISABILITIES: ICD-10-CM

## 2021-12-20 DIAGNOSIS — F25.0 SCHIZOAFFECTIVE DISORDER, BIPOLAR TYPE: ICD-10-CM

## 2021-12-20 LAB
APPEARANCE UR: CLEAR — SIGNIFICANT CHANGE UP
BACTERIA # UR AUTO: ABNORMAL
BILIRUB UR-MCNC: NEGATIVE — SIGNIFICANT CHANGE UP
COLOR SPEC: YELLOW — SIGNIFICANT CHANGE UP
COMMENT - URINE: SIGNIFICANT CHANGE UP
DIFF PNL FLD: NEGATIVE — SIGNIFICANT CHANGE UP
EPI CELLS # UR: ABNORMAL /HPF
GLUCOSE UR QL: NEGATIVE MG/DL — SIGNIFICANT CHANGE UP
KETONES UR-MCNC: NEGATIVE — SIGNIFICANT CHANGE UP
LEUKOCYTE ESTERASE UR-ACNC: NEGATIVE — SIGNIFICANT CHANGE UP
NITRITE UR-MCNC: NEGATIVE — SIGNIFICANT CHANGE UP
PH UR: 6 — SIGNIFICANT CHANGE UP (ref 5–8)
PROT UR-MCNC: ABNORMAL MG/DL
RBC CASTS # UR COMP ASSIST: SIGNIFICANT CHANGE UP /HPF
SP GR SPEC: 1.02 — SIGNIFICANT CHANGE UP (ref 1.01–1.03)
UROBILINOGEN FLD QL: 0.2 MG/DL — SIGNIFICANT CHANGE UP
WBC UR QL: SIGNIFICANT CHANGE UP /HPF

## 2021-12-20 PROCEDURE — 99232 SBSQ HOSP IP/OBS MODERATE 35: CPT

## 2021-12-20 RX ORDER — CHLORPROMAZINE HCL 10 MG
50 TABLET ORAL ONCE
Refills: 0 | Status: COMPLETED | OUTPATIENT
Start: 2021-12-20 | End: 2021-12-20

## 2021-12-20 RX ORDER — VALPROIC ACID (AS SODIUM SALT) 250 MG/5ML
20 SOLUTION, ORAL ORAL
Qty: 0 | Refills: 0 | DISCHARGE

## 2021-12-20 RX ORDER — RISPERIDONE 4 MG/1
1 TABLET ORAL
Refills: 0 | Status: DISCONTINUED | OUTPATIENT
Start: 2021-12-20 | End: 2021-12-23

## 2021-12-20 RX ORDER — ERGOCALCIFEROL 1.25 MG/1
1 CAPSULE ORAL
Qty: 0 | Refills: 0 | DISCHARGE

## 2021-12-20 RX ORDER — DIPHENHYDRAMINE HCL 50 MG
50 CAPSULE ORAL EVERY 6 HOURS
Refills: 0 | Status: DISCONTINUED | OUTPATIENT
Start: 2021-12-20 | End: 2021-12-23

## 2021-12-20 RX ORDER — FLUPHENAZINE HYDROCHLORIDE 1 MG/1
5 TABLET, FILM COATED ORAL ONCE
Refills: 0 | Status: DISCONTINUED | OUTPATIENT
Start: 2021-12-20 | End: 2021-12-20

## 2021-12-20 RX ORDER — FLUPHENAZINE HYDROCHLORIDE 1 MG/1
5 TABLET, FILM COATED ORAL ONCE
Refills: 0 | Status: COMPLETED | OUTPATIENT
Start: 2021-12-20 | End: 2021-12-20

## 2021-12-20 RX ORDER — DIVALPROEX SODIUM 500 MG/1
2000 TABLET, DELAYED RELEASE ORAL ONCE
Refills: 0 | Status: COMPLETED | OUTPATIENT
Start: 2021-12-20 | End: 2021-12-20

## 2021-12-20 RX ORDER — GUANFACINE 3 MG/1
1 TABLET, EXTENDED RELEASE ORAL
Refills: 0 | Status: DISCONTINUED | OUTPATIENT
Start: 2021-12-20 | End: 2021-12-23

## 2021-12-20 RX ORDER — FLUPHENAZINE HYDROCHLORIDE 1 MG/1
5 TABLET, FILM COATED ORAL EVERY 6 HOURS
Refills: 0 | Status: DISCONTINUED | OUTPATIENT
Start: 2021-12-20 | End: 2021-12-23

## 2021-12-20 RX ORDER — DIVALPROEX SODIUM 500 MG/1
500 TABLET, DELAYED RELEASE ORAL
Refills: 0 | Status: DISCONTINUED | OUTPATIENT
Start: 2021-12-20 | End: 2021-12-23

## 2021-12-20 RX ORDER — LEVOTHYROXINE SODIUM 125 MCG
50 TABLET ORAL
Refills: 0 | Status: DISCONTINUED | OUTPATIENT
Start: 2021-12-21 | End: 2021-12-23

## 2021-12-20 RX ORDER — DIPHENHYDRAMINE HCL 50 MG
50 CAPSULE ORAL ONCE
Refills: 0 | Status: COMPLETED | OUTPATIENT
Start: 2021-12-20 | End: 2021-12-20

## 2021-12-20 RX ADMIN — FLUPHENAZINE HYDROCHLORIDE 5 MILLIGRAM(S): 1 TABLET, FILM COATED ORAL at 15:46

## 2021-12-20 RX ADMIN — Medication 100 MILLIGRAM(S): at 08:02

## 2021-12-20 RX ADMIN — Medication 2 MILLIGRAM(S): at 15:47

## 2021-12-20 RX ADMIN — Medication 1 MILLIGRAM(S): at 08:02

## 2021-12-20 RX ADMIN — Medication 50 MILLIGRAM(S): at 07:58

## 2021-12-20 RX ADMIN — Medication 15 MILLIGRAM(S): at 08:01

## 2021-12-20 RX ADMIN — Medication 50 MILLIGRAM(S): at 10:46

## 2021-12-20 RX ADMIN — Medication 50 MILLIGRAM(S): at 15:47

## 2021-12-20 RX ADMIN — Medication 50 MICROGRAM(S): at 08:02

## 2021-12-20 RX ADMIN — DIVALPROEX SODIUM 2000 MILLIGRAM(S): 500 TABLET, DELAYED RELEASE ORAL at 13:09

## 2021-12-20 RX ADMIN — Medication 2 MILLIGRAM(S): at 07:58

## 2021-12-20 RX ADMIN — PANTOPRAZOLE SODIUM 40 MILLIGRAM(S): 20 TABLET, DELAYED RELEASE ORAL at 08:02
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
## 2021-12-20 NOTE — PROGRESS NOTE BEHAVIORAL HEALTH - NSBHADDHXFAMFT_PSY_A_CORE
As per chart review, "Mother and sister both have mental health issues. Mother has hx of minimizing mental health issues for the patient. There is also a family hx of substance abuse."
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
## 2021-12-20 NOTE — PROGRESS NOTE BEHAVIORAL HEALTH - RISK ASSESSMENT
Pt is at chronically elevated risk of harm to self/others given h/o self-harm, h/o aggression, poor impulse control and poor frustration tolerance due to intellectual disability, recent inpatient discharge, change in medications, prior trauma  Protective factors include lack of prior attempts, stable housing, sobriety, connection to services, supportive staff Pt is at chronically elevated risk of harm to self/others given h/o self-harm, h/o aggression, poor impulse control and poor frustration tolerance due to intellectual disability, recent inpatient discharge, change in medications, prior trauma. Protective factors include lack of prior SA, stable housing, sobriety, connection to services, supportive staff.

## 2021-12-20 NOTE — PROGRESS NOTE BEHAVIORAL HEALTH - NSBHADDHXPSYSOCFT_PSY_A_CORE
As per chart review,Born in [place], raised in [place]. Parents are no longer together; mother lives in [place] and works as dialysis technician. Has sister who lives in [place]. Previously living in [facility] group home where he had GF (fellow resident), but relationship ended >2 yrs ago. Living in respite [facility] since 1/2021 due to breakup, conflicts with fellow residents.

## 2021-12-20 NOTE — PROGRESS NOTE BEHAVIORAL HEALTH - NSBHADDHXSUBSTFT_PSY_A_CORE
Pt denies all recent substance use, BAL <10 on admission, Utox negative.  As per chart review, pt reported daily alcohol abuse on admission with changing reports on amount and frequency.
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
## 2021-12-20 NOTE — PROGRESS NOTE BEHAVIORAL HEALTH - NSBHFUPSUICINTERVALFT_PSY_A_CORE
Pt reports SI 2/2 "divorce" from his girlfriend and not liking his current residence, reports plan to stab his left arm with his keys

## 2021-12-20 NOTE — PROGRESS NOTE BEHAVIORAL HEALTH - AXIS III
Asthma, Hypothyroidism, GERD, High Cholesterol, Urinary Retention, BPH Asthma, Hypothyroidism, GERD, High Cholesterol, Urinary Retention/BPH
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
## 2021-12-21 LAB
A1C WITH ESTIMATED AVERAGE GLUCOSE RESULT: 5.9 % — HIGH (ref 4–5.6)
ALBUMIN SERPL ELPH-MCNC: 4.8 G/DL — SIGNIFICANT CHANGE UP (ref 3.5–5.2)
ALP SERPL-CCNC: 133 U/L — HIGH (ref 30–115)
ALT FLD-CCNC: 31 U/L — SIGNIFICANT CHANGE UP (ref 0–41)
ANION GAP SERPL CALC-SCNC: 13 MMOL/L — SIGNIFICANT CHANGE UP (ref 7–14)
AST SERPL-CCNC: 23 U/L — SIGNIFICANT CHANGE UP (ref 0–41)
BILIRUB SERPL-MCNC: 0.2 MG/DL — SIGNIFICANT CHANGE UP (ref 0.2–1.2)
BUN SERPL-MCNC: 16 MG/DL — SIGNIFICANT CHANGE UP (ref 10–20)
CALCIUM SERPL-MCNC: 9.6 MG/DL — SIGNIFICANT CHANGE UP (ref 8.5–10.1)
CHLORIDE SERPL-SCNC: 104 MMOL/L — SIGNIFICANT CHANGE UP (ref 98–110)
CHOLEST SERPL-MCNC: 178 MG/DL — SIGNIFICANT CHANGE UP
CO2 SERPL-SCNC: 21 MMOL/L — SIGNIFICANT CHANGE UP (ref 17–32)
CREAT SERPL-MCNC: 1 MG/DL — SIGNIFICANT CHANGE UP (ref 0.7–1.5)
CULTURE RESULTS: SIGNIFICANT CHANGE UP
ESTIMATED AVERAGE GLUCOSE: 123 MG/DL — HIGH (ref 68–114)
GLUCOSE SERPL-MCNC: 77 MG/DL — SIGNIFICANT CHANGE UP (ref 70–99)
HDLC SERPL-MCNC: 40 MG/DL — LOW
LIPID PNL WITH DIRECT LDL SERPL: 109 MG/DL — HIGH
NON HDL CHOLESTEROL: 138 MG/DL — HIGH
POTASSIUM SERPL-MCNC: 4.6 MMOL/L — SIGNIFICANT CHANGE UP (ref 3.5–5)
POTASSIUM SERPL-SCNC: 4.6 MMOL/L — SIGNIFICANT CHANGE UP (ref 3.5–5)
PROT SERPL-MCNC: 7.4 G/DL — SIGNIFICANT CHANGE UP (ref 6–8)
SODIUM SERPL-SCNC: 138 MMOL/L — SIGNIFICANT CHANGE UP (ref 135–146)
SPECIMEN SOURCE: SIGNIFICANT CHANGE UP
TRIGL SERPL-MCNC: 196 MG/DL — HIGH
TSH SERPL-MCNC: 2.19 UIU/ML — SIGNIFICANT CHANGE UP (ref 0.27–4.2)

## 2021-12-21 PROCEDURE — 99231 SBSQ HOSP IP/OBS SF/LOW 25: CPT

## 2021-12-21 PROCEDURE — 93010 ELECTROCARDIOGRAM REPORT: CPT

## 2021-12-21 RX ADMIN — RISPERIDONE 1 MILLIGRAM(S): 4 TABLET ORAL at 08:43

## 2021-12-21 RX ADMIN — PANTOPRAZOLE SODIUM 40 MILLIGRAM(S): 20 TABLET, DELAYED RELEASE ORAL at 08:43

## 2021-12-21 RX ADMIN — RISPERIDONE 1 MILLIGRAM(S): 4 TABLET ORAL at 20:48

## 2021-12-21 RX ADMIN — DIVALPROEX SODIUM 500 MILLIGRAM(S): 500 TABLET, DELAYED RELEASE ORAL at 08:43

## 2021-12-21 RX ADMIN — Medication 15 MILLIGRAM(S): at 08:43

## 2021-12-21 RX ADMIN — ATORVASTATIN CALCIUM 10 MILLIGRAM(S): 80 TABLET, FILM COATED ORAL at 20:36

## 2021-12-21 RX ADMIN — Medication 1 MILLIGRAM(S): at 20:48

## 2021-12-21 RX ADMIN — Medication 15 MILLIGRAM(S): at 20:37

## 2021-12-21 RX ADMIN — FLUPHENAZINE HYDROCHLORIDE 5 MILLIGRAM(S): 1 TABLET, FILM COATED ORAL at 04:59

## 2021-12-21 RX ADMIN — DIVALPROEX SODIUM 500 MILLIGRAM(S): 500 TABLET, DELAYED RELEASE ORAL at 20:48

## 2021-12-21 RX ADMIN — Medication 2 MILLIGRAM(S): at 04:59

## 2021-12-21 RX ADMIN — MIRTAZAPINE 15 MILLIGRAM(S): 45 TABLET, ORALLY DISINTEGRATING ORAL at 20:37

## 2021-12-21 RX ADMIN — Medication 50 MICROGRAM(S): at 05:53

## 2021-12-21 RX ADMIN — Medication 1 MILLIGRAM(S): at 08:43

## 2021-12-21 NOTE — PROGRESS NOTE BEHAVIORAL HEALTH - NSBHFUPINTERVALHXFT_PSY_A_CORE
Pt seen and evaluated, chart reviewed. As per nursing report, patient was agitated in the early AM and demanded to leave or he would call the . Prolixin and Lorazepam PRNs given. Upon evaluation, patient states that he currently feels "fine" and that his "mood is better." He states that he has been sleeping well and eating well. He states that he has been more irritable "because I am going through a break up." Patient is future oriented and wants to go back to his apartment. He states his goal for today is to remain in good behavioral control. He endorses resolution of SI and CAH of voices telling him to hurt himself. Denies VH and paranoia. Denies SI/HI, intent, and plan. Patient adherent to medications and denies any side effects. 1:1 remains for patient safety.  No s/sx of Covid-19. Pt seen and evaluated, chart reviewed. As per nursing report, no acute events overnight, no PRNs given. On evaluation, patient states that he currently feels "fine" and that his "mood is better." He states that he has been more irritable "because I was going through a break up." He reports good response to start of depakote and risperdal yesterday, states he feels "calm" and more in control of his behaviors, states his goal for today is to remain in good behavioral control. He reports good sleep and appetite. Denies VH and paranoia. He endorses resolution of SI and HI, states he was frustrated when he made prior statements on admission, denied intent. Patient adherent to medications and denies negative side effects. 1:1 remains for patient safety. Pt visible on unit with improved behavioral control.   Pt denies s/sx of Covid-19. Pt seen and evaluated, chart reviewed. As per nursing report, no acute events overnight, no PRNs given. On evaluation, patient states that he currently feels "fine" and that his "mood is better." He states that he has been more irritable "because I was going through a break up." He reports good response to start of depakote and risperdal yesterday, states he feels "calm" and more in control of his behaviors, states his goal for today is to remain in good behavioral control. He reports good sleep and appetite. Denies VH and paranoia. He endorses resolution of SI and HI, states he was frustrated when he made prior statements on admission, denied intent. Patient adherent to medications and denies negative side effects. 1:1 remains for patient safety. Pt visible on unit with improved behavioral control. Of note, repeat EKG today reviewed with medical PA, pt denies CP, QTc WNL, no further w/u at this time. Pt reports resolution of burning while urinating.   Pt denies s/sx of Covid-19.

## 2021-12-21 NOTE — PROGRESS NOTE BEHAVIORAL HEALTH - SUMMARY
31 year-old M, single, living in [facility] group home (currently in own apartment with 1:1 for respite since 1/2021), with PPH schizoaffective disorder, HAVEN, PTSD, ADHD, ID, and high functioning ASD, h/o self-injury (cutting, head banging), no known SAs, h/o multiple psychiatric hospitalizations (last at [facility] in November 2021), PMH asthma, GERD, and hypothyroidism, BIBEMS activated by self at  after fall/arm injury. Pt was medically evaluated/cleared for discharge, when  contacted ED staff to inform that pt had made suicidal and homicidal threats, prompting this evaluation. As per collateral, pt's medication changed in last IPP admission with noted worsened impulsivity and aggression, states pt also observed to appear to be responding to internal stimuli with poor sleep last several days. His presentation appears consistent with decompensation of schizoaffective d/o at this time. Pt requires IPP for stabilization at this time.    #Schizoaffective d/o, bipolar type  -start depakote, given depakote loading dose 2000 mg on 12/20/21, c/w depakote 500 mg bid, f/u vpa level on 12/23/21  -d/c topamax 100 mg bid  -start risperdal 1 mg bid    #ASD, ID, PTSD  -c/w ativan 1 mg bid  -c/w guanfacine 1 mg bid (NF form sent, pending procurement)  -c/w buspar 15 mg bid  -c/w remeron 15 mg qhs    #Asthma, Hypothyroidism, GERD, HLD, BPH  -medicine consult --> c/w home meds    #Agitation  -for agitation not amenable to verbal redirection, may given prolixin 5 mg q6h prn, ativan 2 mg q6h prn, benadryl 50 mg q6h prn with escalation to IM if pt is a danger to self or/and others with repeat EKG to ensure QTc <500 ms. 31 year-old M, single, [facility] (currently in own apartment with 1:1 for respite since 1/2021), with PPH schizoaffective disorder, HAVEN, PTSD, ADHD, ID, and high functioning ASD, h/o self-injury (cutting, head banging), no known SAs, h/o multiple psychiatric hospitalizations (last at [facility] in November 2021), PMH asthma, GERD, and hypothyroidism, BIBEMS activated by self at  after fall/arm injury. Pt was medically evaluated/cleared for discharge, when  contacted ED staff to inform that pt had made suicidal and homicidal threats, prompting this evaluation. As per collateral, pt's medication changed in last IPP admission with noted worsened impulsivity and aggression, states pt also observed to appear to be responding to internal stimuli with poor sleep last several days. His presentation appears consistent with decompensation of schizoaffective d/o at this time. On evaluation, pt verbalizes improvement in mood and anger, and improved behavioral control with medications adjustments and strict limit setting. He reports resolution of SI/HI/I/P, endorses statements made prior to admission d/t frustration, denied intent; pt with improved frustration tolerance.     #Schizoaffective d/o, bipolar type  -start depakote, given depakote loading dose 2000 mg on 12/20/21, c/w depakote 500 mg bid, f/u vpa level on 12/23/21  -d/c topamax 100 mg bid  -start risperdal 1 mg bid    #ASD, ID, PTSD  -c/w ativan 1 mg bid  -c/w guanfacine 1 mg bid (NF form sent, pending procurement)  -c/w buspar 15 mg bid  -c/w remeron 15 mg qhs    #Asthma, Hypothyroidism, GERD, HLD, BPH  -medicine consult --> c/w home meds    #Agitation  -for agitation not amenable to verbal redirection, may given prolixin 5 mg q6h prn, ativan 2 mg q6h prn, benadryl 50 mg q6h prn with escalation to IM if pt is a danger to self or/and others with repeat EKG to ensure QTc <500 ms.

## 2021-12-21 NOTE — ED PROVIDER NOTE - NS ED SCRIBE STATEMENT
Attending impairments found/functional limitations in following categories/risk reduction/prevention/rehab potential/therapy frequency/predicted duration of therapy intervention/anticipated discharge recommendation

## 2021-12-21 NOTE — PROGRESS NOTE BEHAVIORAL HEALTH - NSBHADMITMEDEDUDETAILS_A_CORE FT
Reeducated on risks and benefits of depakote, and side effects profile. Educated on risks and benefits of risperdal, and side effects profile. Pt verbalize understanding.
Reeducated on risks and benefits of depakote, and side effects profile. Educated on risks and benefits of risperdal, and side effects profile. Pt verbalize understanding.

## 2021-12-21 NOTE — PROGRESS NOTE BEHAVIORAL HEALTH - RISK ASSESSMENT
Pt is at chronically elevated risk of harm to self/others given h/o self-harm, h/o aggression, poor impulse control and poor frustration tolerance due to intellectual disability, recent inpatient discharge, change in medications, prior trauma. Protective factors include lack of prior SA, stable housing, sobriety, connection to services, supportive staff.

## 2021-12-22 PROCEDURE — 93010 ELECTROCARDIOGRAM REPORT: CPT

## 2021-12-22 PROCEDURE — 99231 SBSQ HOSP IP/OBS SF/LOW 25: CPT

## 2021-12-22 RX ORDER — DIVALPROEX SODIUM 500 MG/1
1 TABLET, DELAYED RELEASE ORAL
Qty: 28 | Refills: 0
Start: 2021-12-22 | End: 2022-01-04

## 2021-12-22 RX ORDER — PANTOPRAZOLE SODIUM 20 MG/1
1 TABLET, DELAYED RELEASE ORAL
Qty: 14 | Refills: 0
Start: 2021-12-22 | End: 2022-01-04

## 2021-12-22 RX ORDER — PANTOPRAZOLE SODIUM 20 MG/1
1 TABLET, DELAYED RELEASE ORAL
Qty: 0 | Refills: 0 | DISCHARGE

## 2021-12-22 RX ORDER — RISPERIDONE 4 MG/1
1 TABLET ORAL
Qty: 28 | Refills: 0
Start: 2021-12-22 | End: 2022-01-04

## 2021-12-22 RX ORDER — MIRTAZAPINE 45 MG/1
1 TABLET, ORALLY DISINTEGRATING ORAL
Qty: 0 | Refills: 0 | DISCHARGE

## 2021-12-22 RX ORDER — ALBUTEROL 90 UG/1
2 AEROSOL, METERED ORAL
Qty: 0 | Refills: 0 | DISCHARGE

## 2021-12-22 RX ORDER — LEVOTHYROXINE SODIUM 125 MCG
1 TABLET ORAL
Qty: 0 | Refills: 0 | DISCHARGE

## 2021-12-22 RX ORDER — ALBUTEROL 90 UG/1
2 AEROSOL, METERED ORAL
Qty: 1 | Refills: 0
Start: 2021-12-22 | End: 2022-01-04

## 2021-12-22 RX ORDER — IBUPROFEN 200 MG
400 TABLET ORAL ONCE
Refills: 0 | Status: DISCONTINUED | OUTPATIENT
Start: 2021-12-22 | End: 2021-12-23

## 2021-12-22 RX ORDER — TOPIRAMATE 25 MG
1 TABLET ORAL
Qty: 0 | Refills: 0 | DISCHARGE

## 2021-12-22 RX ORDER — GUANFACINE 3 MG/1
1 TABLET, EXTENDED RELEASE ORAL
Qty: 28 | Refills: 0
Start: 2021-12-22 | End: 2022-01-04

## 2021-12-22 RX ORDER — GUANFACINE 3 MG/1
1 TABLET, EXTENDED RELEASE ORAL
Qty: 0 | Refills: 0 | DISCHARGE

## 2021-12-22 RX ORDER — MIRTAZAPINE 45 MG/1
1 TABLET, ORALLY DISINTEGRATING ORAL
Qty: 14 | Refills: 0
Start: 2021-12-22 | End: 2022-01-04

## 2021-12-22 RX ORDER — NICOTINE POLACRILEX 2 MG
1 GUM BUCCAL
Qty: 14 | Refills: 0
Start: 2021-12-22 | End: 2022-01-04

## 2021-12-22 RX ORDER — LEVOTHYROXINE SODIUM 125 MCG
1 TABLET ORAL
Qty: 14 | Refills: 0
Start: 2021-12-22 | End: 2022-01-04

## 2021-12-22 RX ADMIN — RISPERIDONE 1 MILLIGRAM(S): 4 TABLET ORAL at 08:26

## 2021-12-22 RX ADMIN — RISPERIDONE 1 MILLIGRAM(S): 4 TABLET ORAL at 21:14

## 2021-12-22 RX ADMIN — MIRTAZAPINE 15 MILLIGRAM(S): 45 TABLET, ORALLY DISINTEGRATING ORAL at 21:14

## 2021-12-22 RX ADMIN — Medication 15 MILLIGRAM(S): at 08:26

## 2021-12-22 RX ADMIN — Medication 1 MILLIGRAM(S): at 21:18

## 2021-12-22 RX ADMIN — Medication 50 MICROGRAM(S): at 06:12

## 2021-12-22 RX ADMIN — Medication 15 MILLIGRAM(S): at 21:15

## 2021-12-22 RX ADMIN — PANTOPRAZOLE SODIUM 40 MILLIGRAM(S): 20 TABLET, DELAYED RELEASE ORAL at 08:26

## 2021-12-22 RX ADMIN — DIVALPROEX SODIUM 500 MILLIGRAM(S): 500 TABLET, DELAYED RELEASE ORAL at 21:14

## 2021-12-22 RX ADMIN — Medication 650 MILLIGRAM(S): at 17:32

## 2021-12-22 RX ADMIN — Medication 1 MILLIGRAM(S): at 08:26

## 2021-12-22 RX ADMIN — Medication 2 MILLIGRAM(S): at 12:53

## 2021-12-22 RX ADMIN — Medication 2 MILLIGRAM(S): at 02:37

## 2021-12-22 RX ADMIN — ATORVASTATIN CALCIUM 10 MILLIGRAM(S): 80 TABLET, FILM COATED ORAL at 21:14

## 2021-12-22 RX ADMIN — DIVALPROEX SODIUM 500 MILLIGRAM(S): 500 TABLET, DELAYED RELEASE ORAL at 08:26

## 2021-12-22 NOTE — PROGRESS NOTE BEHAVIORAL HEALTH - NSBHFUPINTERVALHXFT_PSY_A_CORE
Pt seen and evaluated, chart reviewed. As per nursing report, no acute events overnight. On evaluation, pt presents well-groomed and cooperative, states he is looking forward to getting discharged so he can be home for [place]. He reports good mood, states he feels "calm" and in more control of his behaviors since start of depakote and risperdal. He agrees to behavioral expectations and safety planning. He reports good sleep and appetite, regular BMs. Denies AVH and paranoia; pt does not appear internally preoccupied. He denies SI/HI, intent and plan. Pt appears future- and goal-oriented. He is adherent to medications and denies negative side effects. Visible on unit and in behavioral control. Contacted [facility] Director, Cassy Pak, and pt's 1:1 at homeCandy- updated on POC and discharge instructions. Discharge for tomorrow.   Pt denies s/sx of Covid-19.    Attempted to obtain collateral with pt's OP provider, Dr. Duffy ([phone]) - left msg and callback #

## 2021-12-22 NOTE — PROGRESS NOTE BEHAVIORAL HEALTH - SUMMARY
31 year-old M, single, living in [facility] group home (currently in own apartment with 1:1 for respite since 1/2021), with PPH schizoaffective disorder, HAVEN, PTSD, ADHD, ID, and high functioning ASD, h/o self-injury (cutting, head banging), no known SAs, h/o multiple psychiatric hospitalizations (last at [facility] in November 2021), PMH asthma, GERD, and hypothyroidism, BIBEMS activated by self at  after fall/arm injury. Pt was medically evaluated/cleared for discharge, when  contacted ED staff to inform that pt had made suicidal and homicidal threats, prompting this evaluation. As per collateral, pt's medication changed in last IPP admission with noted worsened impulsivity and aggression, states pt also observed to appear to be responding to internal stimuli with poor sleep last several days. His presentation appears consistent with decompensation of schizoaffective d/o at this time. On evaluation, pt verbalizes improvement in mood and anger, and improved behavioral control with medications adjustments and strict limit setting. He reports resolution of SI/HI/I/P, endorses statements made prior to admission d/t frustration, denied intent; pt with improved frustration tolerance.     #Schizoaffective d/o, bipolar type  -start depakote, given depakote loading dose 2000 mg on 12/20/21, c/w depakote 500 mg bid, f/u vpa level on 12/23/21  -d/c topamax 100 mg bid  -start risperdal 1 mg bid    #ASD, ID, PTSD  -c/w ativan 1 mg bid  -c/w guanfacine 1 mg bid (NF form sent, pending procurement)  -c/w buspar 15 mg bid  -c/w remeron 15 mg qhs    #Asthma, Hypothyroidism, GERD, HLD, BPH  -medicine consult --> c/w home meds    #Agitation  -for agitation not amenable to verbal redirection, may given prolixin 5 mg q6h prn, ativan 2 mg q6h prn, benadryl 50 mg q6h prn with escalation to IM if pt is a danger to self or/and others with repeat EKG to ensure QTc <500 ms. 31 year-old M, single, living in [facility] group home (currently in own apartment with 1:1 for respite since 1/2021), with PPH schizoaffective disorder, HAVEN, PTSD, ADHD, ID, and high functioning ASD, h/o self-injury (cutting, head banging), no known SAs, h/o multiple psychiatric hospitalizations (last at [facility] in November 2021), PMH asthma, GERD, and hypothyroidism, BIBEMS activated by self at  after fall/arm injury. Pt was medically evaluated/cleared for discharge, when  contacted ED staff to inform that pt had made suicidal and homicidal threats, prompting this evaluation. As per collateral, pt's medication changed in last IPP admission with noted worsened impulsivity and aggression, states pt also observed to appear to be responding to internal stimuli with poor sleep last several days. His presentation appears consistent with decompensation of schizoaffective d/o at this time. On evaluation, pt verbalizes improvement in mood and anger, and improved behavioral control with medications adjustments and strict limit setting. He reports resolution of SI/HI/I/P, endorses statements made prior to admission d/t frustration, denied intent; pt with improved frustration tolerance.     #Schizoaffective d/o, bipolar type  -start depakote, given depakote loading dose 2000 mg on 12/20/21, c/w depakote 500 mg bid, f/u vpa level on 12/23/21  -d/c topamax 100 mg bid  -start risperdal 1 mg bid    #ASD, ID, PTSD  -c/w ativan 1 mg bid, titrate ativan 1 mg tid  -c/w guanfacine 1 mg bid (NF form sent, pending procurement)  -c/w buspar 15 mg bid  -c/w remeron 15 mg qhs    #Asthma, Hypothyroidism, GERD, HLD, BPH  -medicine consult --> c/w home meds    #Agitation  -for agitation not amenable to verbal redirection, may given prolixin 5 mg q6h prn, ativan 2 mg q6h prn, benadryl 50 mg q6h prn with escalation to IM if pt is a danger to self or/and others with repeat EKG to ensure QTc <500 ms.

## 2021-12-22 NOTE — PROGRESS NOTE BEHAVIORAL HEALTH - RISK ASSESSMENT
Pt is at chronically elevated risk of harm to self/others given h/o self-harm, h/o aggression, poor impulse control and poor frustration tolerance due to intellectual disability, recent inpatient discharge, change in medications, prior trauma. Protective factors include lack of prior SA, stable housing, sobriety, connection to services, supportive staff, denies SI/HI/I/P, future oriented, cooperative with treatment, willingness to utilize crisis services/ED in times of crisis.

## 2021-12-23 VITALS
TEMPERATURE: 96 F | HEART RATE: 112 BPM | RESPIRATION RATE: 20 BRPM | DIASTOLIC BLOOD PRESSURE: 71 MMHG | SYSTOLIC BLOOD PRESSURE: 142 MMHG

## 2021-12-23 DIAGNOSIS — F90.9 ATTENTION-DEFICIT HYPERACTIVITY DISORDER, UNSPECIFIED TYPE: ICD-10-CM

## 2021-12-23 DIAGNOSIS — F41.1 GENERALIZED ANXIETY DISORDER: ICD-10-CM

## 2021-12-23 DIAGNOSIS — F43.25 ADJUSTMENT DISORDER WITH MIXED DISTURBANCE OF EMOTIONS AND CONDUCT: ICD-10-CM

## 2021-12-23 LAB
ALBUMIN SERPL ELPH-MCNC: 4.4 G/DL — SIGNIFICANT CHANGE UP (ref 3.5–5.2)
ALP SERPL-CCNC: 136 U/L — HIGH (ref 30–115)
ALT FLD-CCNC: 21 U/L — SIGNIFICANT CHANGE UP (ref 0–41)
AST SERPL-CCNC: 21 U/L — SIGNIFICANT CHANGE UP (ref 0–41)
BILIRUB DIRECT SERPL-MCNC: <0.2 MG/DL — SIGNIFICANT CHANGE UP (ref 0–0.3)
BILIRUB INDIRECT FLD-MCNC: 0 MG/DL — SIGNIFICANT CHANGE UP (ref 0.2–1.2)
BILIRUB SERPL-MCNC: <0.2 MG/DL — SIGNIFICANT CHANGE UP (ref 0.2–1.2)
PROT SERPL-MCNC: 6.7 G/DL — SIGNIFICANT CHANGE UP (ref 6–8)
RAPID RVP RESULT: SIGNIFICANT CHANGE UP
SARS-COV-2 RNA SPEC QL NAA+PROBE: SIGNIFICANT CHANGE UP
VALPROATE SERPL-MCNC: 64 UG/ML — SIGNIFICANT CHANGE UP (ref 50–100)

## 2021-12-23 PROCEDURE — 99238 HOSP IP/OBS DSCHRG MGMT 30/<: CPT

## 2021-12-23 RX ADMIN — DIVALPROEX SODIUM 500 MILLIGRAM(S): 500 TABLET, DELAYED RELEASE ORAL at 09:19

## 2021-12-23 RX ADMIN — PANTOPRAZOLE SODIUM 40 MILLIGRAM(S): 20 TABLET, DELAYED RELEASE ORAL at 09:18

## 2021-12-23 RX ADMIN — Medication 50 MICROGRAM(S): at 05:02

## 2021-12-23 RX ADMIN — Medication 15 MILLIGRAM(S): at 09:18

## 2021-12-23 RX ADMIN — GUANFACINE 1 MILLIGRAM(S): 3 TABLET, EXTENDED RELEASE ORAL at 05:09

## 2021-12-23 RX ADMIN — Medication 1 MILLIGRAM(S): at 14:56

## 2021-12-23 RX ADMIN — RISPERIDONE 1 MILLIGRAM(S): 4 TABLET ORAL at 09:18

## 2021-12-23 RX ADMIN — Medication 1 MILLIGRAM(S): at 09:19

## 2021-12-23 RX ADMIN — Medication 2 MILLIGRAM(S): at 03:28
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
## 2021-12-23 NOTE — PROGRESS NOTE BEHAVIORAL HEALTH - SUMMARY
31 year-old M, single, living in [facility] group home (currently in own apartment with 1:1 for respite since 1/2021), with PPH schizoaffective disorder, HAVEN, PTSD, ADHD, ID, and high functioning ASD, h/o self-injury (cutting, head banging), no known SAs, h/o multiple psychiatric hospitalizations (last at [facility] in November 2021), PMH asthma, GERD, and hypothyroidism, BIBEMS activated by self at  after fall/arm injury. Pt was medically evaluated/cleared for discharge, when  contacted ED staff to inform that pt had made suicidal and homicidal threats, prompting this evaluation. As per collateral, pt's medication changed in last IPP admission with noted worsened impulsivity and aggression, states pt also observed to appear to be responding to internal stimuli with poor sleep last several days. His presentation appears consistent with decompensation of schizoaffective d/o at this time. On evaluation, pt verbalizes improvement in mood and anger, and improved behavioral control with medications adjustments and strict limit setting. He retracts SI/HI/I/P, endorses statements made prior to admission d/t frustration of his breakup with girlfriend and unemployment status/financial limitations, denied intent. His limited impulse control and  frustration tolerance appears primarily secondary to ASD/ID with fair response to setting strict limit setting and behavioral expectations. His presentation appears consistent with adjustment d/o at this time with underlying PPH. Discharge for today.     #Adjustment d/o   -start depakote, given depakote loading dose 2000 mg on 12/20/21, c/w depakote 500 mg bid, vpa level 64 (12/23/21)  -d/c topamax 100 mg bid  -start risperdal 1 mg bid    #ASD, ID, HAVEN, ADHD  -c/w ativan 1 mg bid, titrate ativan 1 mg tid  -c/w guanfacine 1 mg bid (NF form sent)  -c/w buspar 15 mg bid  -c/w remeron 15 mg qhs    #Asthma, Hypothyroidism, GERD, HLD, BPH  -medicine consult --> c/w home meds    #Agitation  -for agitation not amenable to verbal redirection, may given prolixin 5 mg q6h prn, ativan 2 mg q6h prn, benadryl 50 mg q6h prn with escalation to IM if pt is a danger to self or/and others with repeat EKG to ensure QTc <500 ms.

## 2021-12-23 NOTE — DISCHARGE NOTE BEHAVIORAL HEALTH - NSBHDCMEDSFT_PSY_A_CORE
Topamax discontinued, restarted on loading dose of depakote and continued with depakote 500 mg bid with vpa level pending. Started on risperdal 1 mg bid. Titrated ativan 1 mg tid. Continued with home medications- guanfacine 1 mg bid, buspar 15 mg bid, mirtazapine 15 mg qhs. Pt tolerated medications well, denied negative side effects.

## 2021-12-23 NOTE — DISCHARGE NOTE BEHAVIORAL HEALTH - MEDICATION SUMMARY - MEDICATIONS TO STOP TAKING
I will STOP taking the medications listed below when I get home from the hospital:    Topamax 100 mg oral tablet  -- 1 tab(s) by mouth 2 times a day

## 2021-12-23 NOTE — PROGRESS NOTE BEHAVIORAL HEALTH - NSBHCHARTREVIEWVS_PSY_A_CORE FT
Vital Signs Last 24 Hrs  T(C): 35.7 (23 Dec 2021 05:36), Max: 36.4 (22 Dec 2021 16:00)  T(F): 96.2 (23 Dec 2021 05:36), Max: 97.6 (22 Dec 2021 16:00)  HR: 112 (23 Dec 2021 05:36) (102 - 112) --> manual HR: 98 (12/22/21 0930)  BP: 142/71 (23 Dec 2021 05:36) (142/71 - 147/91)  BP(mean): --  RR: 20 (23 Dec 2021 05:36) (18 - 20)  SpO2: --
Vital Signs Last 24 Hrs  T(C): 35.3 (22 Dec 2021 07:31), Max: 35.8 (21 Dec 2021 15:26)  T(F): 95.5 (22 Dec 2021 07:31), Max: 96.5 (21 Dec 2021 15:26)  HR: 111 (22 Dec 2021 07:31) (111 - 120)  BP: 125/71 (22 Dec 2021 07:31) (125/71 - 132/83)  BP(mean): --  RR: 18 (22 Dec 2021 07:31) (16 - 18)  SpO2: --
Vital Signs Last 24 Hrs  T(C): 36.9 (20 Dec 2021 07:56), Max: 36.9 (20 Dec 2021 07:56)  T(F): 98.4 (20 Dec 2021 07:56), Max: 98.4 (20 Dec 2021 07:56)  HR: 121 (20 Dec 2021 07:56) (82 - 121)  BP: 145/86 (20 Dec 2021 07:56) (103/80 - 145/86)  BP(mean): --  RR: 20 (20 Dec 2021 07:56) (16 - 20)  SpO2: --
Vital Signs Last 24 Hrs  T(C): 36.2 (21 Dec 2021 07:52), Max: 36.4 (21 Dec 2021 05:46)  T(F): 97.1 (21 Dec 2021 07:52), Max: 97.5 (21 Dec 2021 05:46)  HR: 117 (21 Dec 2021 07:52) (110 - 132)  BP: 131/81 (21 Dec 2021 07:52) (124/79 - 131/81)  BP(mean): --  RR: 16 (21 Dec 2021 07:52) (16 - 18)  SpO2: --

## 2021-12-23 NOTE — DISCHARGE NOTE BEHAVIORAL HEALTH - NSBHDCVIOLFCTRSFT_PSY_A_CORE
Affective dysregulation, collateral reports of ?AH, medication/treatment nonadherence, pt reports ?alcohol abuse (BAL negative)

## 2021-12-23 NOTE — DISCHARGE NOTE BEHAVIORAL HEALTH - NSTOBACCOREFERRAL_GEN_A_NCS
Patient registered and referred to the [place] [facility]. Phone appointment scheduled for 12/27/21 at 0900/Yes

## 2021-12-23 NOTE — DISCHARGE NOTE BEHAVIORAL HEALTH - NSBHDCADMRISKMITFT_PSY_A_CORE
Collaboration with [facility] staff. Patient is future-oriented, looking forward to being home for the holidays and verbalizes improvement of impulse control

## 2021-12-23 NOTE — PROGRESS NOTE BEHAVIORAL HEALTH - PRIMARY DX
Schizoaffective disorder, bipolar type
Adjustment disorder with mixed disturbance of emotions and conduct
Schizoaffective disorder, bipolar type
Schizoaffective disorder, bipolar type

## 2021-12-23 NOTE — DISCHARGE NOTE BEHAVIORAL HEALTH - NSBHDCMEDICALFT_PSY_A_CORE
Pt c/o chest pain, medicine consulted and "ruled out for ACS... EKG reviewed with no acute changes and similar to prior EKG"
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
## 2021-12-23 NOTE — DISCHARGE NOTE BEHAVIORAL HEALTH - MEDICATION SUMMARY - MEDICATIONS TO TAKE
I will START or STAY ON the medications listed below when I get home from the hospital:    guanFACINE 1 mg oral tablet  -- 1 tab(s) by mouth 2 times a day x 14 days Continue to take as prescribed until told otherwise by your provider  -- Indication: For ADHD    divalproex sodium 500 mg oral delayed release tablet  -- 1 tab(s) by mouth 2 times a day x 14 days Continue to take as prescribed until told otherwise by your provider  -- Indication: For Mood    LORazepam 1 mg oral tablet  -- 1 tab(s) by mouth 3 times a day x 14 days  Continue to take as prescribed until told otherwise by your provider MDD:3 tabs  -- Indication: For Anxiety    mirtazapine 15 mg oral tablet  -- 1 tab(s) by mouth once a day (at bedtime) x 14 days  Continue to take as prescribed until told otherwise by your provider  -- Indication: For Mood    pravastatin 20 mg oral tablet  -- 1 tab(s) by mouth once a day x 14 days  Continue to take as prescribed until told otherwise by your provider  -- Indication: For High cholesterol    risperiDONE 1 mg oral tablet  -- 1 tab(s) by mouth 2 times a day x 14 days  Continue to take as prescribed until told otherwise by your provider  -- Indication: For Mood    busPIRone 15 mg oral tablet  -- 1 tab(s) by mouth 2 times a day x 14 days  Continue to take as prescribed until told otherwise by your provider  -- Indication: For Anxiety    albuterol 90 mcg/inh inhalation aerosol  -- 2 puff(s) inhaled every 6 hours x 14 days, As Needed  - wheezing  Continue to take as prescribed until told otherwise by your provider  -- Indication: For Asthma    pantoprazole 40 mg oral delayed release tablet  -- 1 tab(s) by mouth once a day (before a meal) x 14 days  Continue to take as prescribed until told otherwise by your provider  -- Indication: For GERD    nicotine 21 mg/24 hr transdermal film, extended release  -- 1 patch by transdermal patch once a day x 14 days  Continue to take as prescribed until told otherwise by your provider  -- Indication: For Smoking cessation    levothyroxine 50 mcg (0.05 mg) oral tablet  -- 1 tab(s) by mouth once a day x 14 days  Continue to take as prescribed until told otherwise by your provider  -- Indication: For Hypothyroidism

## 2021-12-23 NOTE — DISCHARGE NOTE BEHAVIORAL HEALTH - NSBHDCSUBSTHXFT_PSY_A_CORE
As noted above.  Pt denies all recent substance use, BAL <10 on admission, Utox negative.  As per chart review, pt reported daily alcohol abuse on admission with changing reports on amount and frequency.

## 2021-12-23 NOTE — DISCHARGE NOTE BEHAVIORAL HEALTH - NSBHDCSWCOMMENTSFT_PSY_A_CORE
discharge emailed to [email or web address] 12-23-21 2pm discharge emailed to [email or web address]  and faxed to [facility] [phone] 12-23-21 2pm

## 2021-12-23 NOTE — DISCHARGE NOTE BEHAVIORAL HEALTH - NSBHDCSUICFCTROTHERFT_PSY_A_CORE
Patient and provider identified future stressors as being arguments with loved ones, nonadherence with medication/outpatient follow up, illicit substances abuse, employment/financial instability.

## 2021-12-23 NOTE — DISCHARGE NOTE BEHAVIORAL HEALTH - NSBHDCRESPONSEFT_PSY_A_CORE
Pt has made significant progress over the course of hospitalization. With continuous psychotherapy from the treatment team and medications adjustment, patient reports feeling better with improved sleep and appetite. Pt's limited impulse control and  frustration tolerance appears primarily secondary to ASD/ID with fair response to setting strict limit setting and behavioral expectations, and pt with improved behavioral control. Patient denied any suicidal or homicidal ideations. Patient denied any auditory or visual hallucinations with no overt psychosis noted during admission. Pt was evaluated by treatment team, pt is stable for discharge and patient shows no imminent danger to self, others or property at this time. Pt understands and agrees with treatment plan and following up with outpatient. Psychoeducation provided regarding diagnosis, medications, treatment and follow up. Risks, benefits, alternatives discussed, all questions and concerns addressed and answered.

## 2021-12-23 NOTE — CHART NOTE - NSCHARTNOTESELECT_GEN_ALL_CORE
Medical PA/Event Note
PA Note/Event Note
Event Note
Seclusion Note/Event Note
brief note

## 2021-12-23 NOTE — PROGRESS NOTE BEHAVIORAL HEALTH - NSBHFUPINTERVALHXFT_PSY_A_CORE
Pt seen and evaluated, chart reviewed. As per nursing report, pt with episodes of irritability d/t needs not being met right away, more verbally redirectable. On evaluation, pt presents well-groomed and cooperative, states he wants to go Megan shopping. He reports good mood, states he feels "calmer" since admission. Pt endorses improved sleep and appetite. Denies AVH and paranoia with no overt psychosis observed. He denies SI/HI, intent and plan. Pt appears future- and goal-oriented. He is adherent to medications and denies negative side effects. Valproic acid level 64. Visible on unit and with improved behavioral control with strict limit setting and behavioral expectations. Spoke with [facility] staff Candy- updated on pt's progress and discharge instructions, collaborated on limit setting and behavioral modification techniques, agrees with discharge today.   Pt denies s/sx of Covid-19.

## 2021-12-23 NOTE — PROGRESS NOTE BEHAVIORAL HEALTH - NSBHFUPDXUPDATEFT_PSY_A_CORE
During admission, no overt psychosis observed with mood dysregulation isolated to episodes when wants are not being met right away. Pt retracts SI/HI statements made on admission, reports statements made 2/2 frustration of his breakup with girlfriend and unemployment status/financial limitations. His limited impulse control and  frustration tolerance appears primarily secondary to ASD/ID with fair response to setting strict limit setting and behavioral expectations.

## 2021-12-23 NOTE — DISCHARGE NOTE BEHAVIORAL HEALTH - NSBHDCDXVALIDYESFT_PSY_A_CORE
ASD, ID, HAVEN, ADHD; r/o schizoaffective d/o  During admission, no overt psychosis observed. His limited impulse control and  frustration tolerance appears primarily secondary to ASD/ID with fair response to setting strict limit setting and behavioral expectations. Adjustment d/o, ASD, ID, HAVEN, ADHD; r/o schizoaffective d/o, bipolar type  During admission, no overt psychosis observed with mood dysregulation specific to needs not being met right away. Pt retracts SI/HI statements made on admission, reports statements made 2/2 frustration of his breakup and unemployment status/financial limitations. His limited impulse control and  frustration tolerance appears primarily secondary to ASD/ID with fair response to setting strict limit setting and behavioral expectations.

## 2021-12-23 NOTE — DISCHARGE NOTE BEHAVIORAL HEALTH - NSBHDCSUICPROTECTFT_PSY_A_CORE
Supportive family and [facility] support staff, medication and treatment adherence, future orientation, ability to state reasons for living, verbalizes improved impulse control, sobriety, willingness to seek care in moment of crisis

## 2021-12-23 NOTE — DISCHARGE NOTE BEHAVIORAL HEALTH - NSBHDCSUICFCTRSFT_PSY_A_CORE
Affective dysregulation, collateral reports of ?AH, medication/treatment nonadherence, pt reports alcohol abuse Affective dysregulation, collateral reports of ?AH, medication/treatment nonadherence, pt reports ?alcohol abuse (BAL negative)

## 2021-12-23 NOTE — DISCHARGE NOTE BEHAVIORAL HEALTH - NSBHDCTHERAPYFT_PSY_A_CORE
Patient was provided with milieu therapy as well as daily supportive psychotherapy. Patient has been actively engaged in treatment, attending several groups. Patient has attended group related to illness management and recovery as well as DBT crisis skill group. Reinforced positive coping skills learned on the unit, ie STOP skill.

## 2021-12-23 NOTE — DISCHARGE NOTE BEHAVIORAL HEALTH - MEDICATION SUMMARY - MEDICATIONS TO CHANGE
I will SWITCH the dose or number of times a day I take the medications listed below when I get home from the hospital:    Ativan 1 mg oral tablet  -- 1 tab(s) by mouth 2 times a day

## 2021-12-23 NOTE — DISCHARGE NOTE BEHAVIORAL HEALTH - NSBHDCLABSFT_PSY_A_CORE
Continue to monitor EKG, valproic acid level, weight, BP, CBC, CMP, Hemoglobin A1c, fasting glucose and Lipid profile.

## 2021-12-23 NOTE — PROGRESS NOTE BEHAVIORAL HEALTH - RISK ASSESSMENT
Pt is at chronically elevated risk of harm to self/others given h/o self-harm, h/o aggression, limited impulse control and frustration tolerance due to intellectual disability, prior trauma. Protective factors include retraction of SI/HI/I/P, denies current SI/HI/I/P, lack of prior SA, stable housing, sobriety, connection to services, supportive staff, future oriented, cooperative with treatment, improved response to limit setting and behavioral modification techniques, willingness to utilize crisis services/ED in times of crisis.

## 2021-12-23 NOTE — DISCHARGE NOTE BEHAVIORAL HEALTH - FAMILY HISTORY OF PSYCHIATRIC ILLNESS
As noted above.  As per chart review,Born in [place], raised in [place]. Parents are no longer together; mother lives in [place] and works as dialysis technician. Has sister who lives in [place]. Previously living in [facility] [place] where he had GF (fellow resident), but relationship ended >2 yrs ago. Living in respite apt since 1/2021 due to breakup, conflicts with fellow residents.  As per chart review, "Mother and sister both have mental health issues. Mother has hx of minimizing mental health issues for the patient. There is also a family hx of substance abuse."

## 2021-12-23 NOTE — PROGRESS NOTE BEHAVIORAL HEALTH - NSBHCHARTREVIEWLAB_PSY_A_CORE FT
Comprehensive Metabolic Panel in AM (12.21.21 @ 08:53)   Sodium, Serum: 138 mmol/L   Potassium, Serum: 4.6: Slighty Hemolyzed use with Caution mmol/L   Chloride, Serum: 104 mmol/L   Carbon Dioxide, Serum: 21 mmol/L   Anion Gap, Serum: 13 mmol/L   Blood Urea Nitrogen, Serum: 16 mg/dL   Creatinine, Serum: 1.0 mg/dL   Glucose, Serum: 77 mg/dL   Calcium, Total Serum: 9.6 mg/dL   Protein Total, Serum: 7.4 g/dL   Albumin, Serum: 4.8 g/dL   Bilirubin Total, Serum: 0.2 mg/dL   Alkaline Phosphatase, Serum: 133 U/L   Aspartate Aminotransferase (AST/SGOT): 23: Hemolyzed. Interpret with caution U/L   Alanine Aminotransferase (ALT/SGPT): 31 U/L   eGFR if Non : 99  eGFR if : 115 mL/min/1.73M2  Complete Blood Count + Automated Diff (12.17.21 @ 19:04)   WBC Count: 9.40 K/uL   RBC Count: 6.02 M/uL   Hemoglobin: 13.4 g/dL   Hematocrit: 44.9 %   Mean Cell Volume: 74.6 fl   Mean Cell Hemoglobin: 22.3 pg   Mean Cell Hemoglobin Conc: 29.8 gm/dL   Red Cell Distrib Width: 19.5 %   Platelet Count - Automated: 389 K/uL   Auto Neutrophil #: 5.55 K/uL   Auto Lymphocyte #: 3.11 K/uL   Auto Monocyte #: 0.47 K/uL   Auto Eosinophil #: 0.19 K/uL   Auto Basophil #: 0.06 K/uL   Auto Neutrophil %: 59.1: Differential percentages must be correlated with absolute numbers for   clinical significance. %   Auto Lymphocyte %: 33.1 %   Auto Monocyte %: 5.0 %   Auto Eosinophil %: 2.0 %   Auto Basophil %: 0.6 %   Auto Immature Granulocyte %: 0.2: (Includes meta, myelo and promyelocytes) %   Nucleated RBC: 0 /100 WBCs
Complete Blood Count + Automated Diff (12.17.21 @ 19:04)   WBC Count: 9.40 K/uL   RBC Count: 6.02 M/uL   Hemoglobin: 13.4 g/dL   Hematocrit: 44.9 %   Mean Cell Volume: 74.6 fl   Mean Cell Hemoglobin: 22.3 pg   Mean Cell Hemoglobin Conc: 29.8 gm/dL   Red Cell Distrib Width: 19.5 %   Platelet Count - Automated: 389 K/uL   Auto Neutrophil #: 5.55 K/uL   Auto Lymphocyte #: 3.11 K/uL   Auto Monocyte #: 0.47 K/uL   Auto Eosinophil #: 0.19 K/uL   Auto Basophil #: 0.06 K/uL   Auto Neutrophil %: 59.1  Auto Lymphocyte %: 33.1 %   Auto Monocyte %: 5.0 %   Auto Eosinophil %: 2.0 %   Auto Basophil %: 0.6 %   Auto Immature Granulocyte %: 0.2: (Includes meta, myelo and promyelocytes) %   Nucleated RBC: 0 /100 WBCs   Basic Metabolic Panel (12.17.21 @ 19:04)   Sodium, Serum: 139 mmol/L   Potassium, Serum: 4.9: Specimen is moderately hemolyzed, results may be affected mmol/L   Chloride, Serum: 111 mmol/L   Carbon Dioxide, Serum: 25 mmol/L   Anion Gap, Serum: 3 mmol/L   Blood Urea Nitrogen, Serum: 16 mg/dL   Creatinine, Serum: 1.06 mg/dL   Glucose, Serum: 93 mg/dL   Calcium, Total Serum: 8.9 mg/dL   eGFR if Non : 92:  eGFR if : 107 mL/min/1.73M2
Comprehensive Metabolic Panel in AM (12.21.21 @ 08:53)   Sodium, Serum: 138 mmol/L   Potassium, Serum: 4.6: Slighty Hemolyzed use with Caution mmol/L   Chloride, Serum: 104 mmol/L   Carbon Dioxide, Serum: 21 mmol/L   Anion Gap, Serum: 13 mmol/L   Blood Urea Nitrogen, Serum: 16 mg/dL   Creatinine, Serum: 1.0 mg/dL   Glucose, Serum: 77 mg/dL   Calcium, Total Serum: 9.6 mg/dL   Protein Total, Serum: 7.4 g/dL   Albumin, Serum: 4.8 g/dL   Bilirubin Total, Serum: 0.2 mg/dL   Alkaline Phosphatase, Serum: 133 U/L   Aspartate Aminotransferase (AST/SGOT): 23: Hemolyzed. Interpret with caution U/L   Alanine Aminotransferase (ALT/SGPT): 31 U/L   eGFR if Non : 99  eGFR if : 115 mL/min/1.73M2  Complete Blood Count + Automated Diff (12.17.21 @ 19:04)   WBC Count: 9.40 K/uL   RBC Count: 6.02 M/uL   Hemoglobin: 13.4 g/dL   Hematocrit: 44.9 %   Mean Cell Volume: 74.6 fl   Mean Cell Hemoglobin: 22.3 pg   Mean Cell Hemoglobin Conc: 29.8 gm/dL   Red Cell Distrib Width: 19.5 %   Platelet Count - Automated: 389 K/uL   Auto Neutrophil #: 5.55 K/uL   Auto Lymphocyte #: 3.11 K/uL   Auto Monocyte #: 0.47 K/uL   Auto Eosinophil #: 0.19 K/uL   Auto Basophil #: 0.06 K/uL   Auto Neutrophil %: 59.1: Differential percentages must be correlated with absolute numbers for   clinical significance. %   Auto Lymphocyte %: 33.1 %   Auto Monocyte %: 5.0 %   Auto Eosinophil %: 2.0 %   Auto Basophil %: 0.6 %   Auto Immature Granulocyte %: 0.2: (Includes meta, myelo and promyelocytes) %   Nucleated RBC: 0 /100 WBCs
Valproic Acid Level, Serum (12.23.21 @ 09:09) Valproic Acid Level, Serum: 64.0 ug/mL   Comprehensive Metabolic Panel in AM (12.21.21 @ 08:53)   Sodium, Serum: 138 mmol/L   Potassium, Serum: 4.6: Slighty Hemolyzed use with Caution mmol/L   Chloride, Serum: 104 mmol/L   Carbon Dioxide, Serum: 21 mmol/L   Anion Gap, Serum: 13 mmol/L   Blood Urea Nitrogen, Serum: 16 mg/dL   Creatinine, Serum: 1.0 mg/dL   Glucose, Serum: 77 mg/dL   Calcium, Total Serum: 9.6 mg/dL   Protein Total, Serum: 7.4 g/dL   Albumin, Serum: 4.8 g/dL   Bilirubin Total, Serum: 0.2 mg/dL   Alkaline Phosphatase, Serum: 133 U/L   Aspartate Aminotransferase (AST/SGOT): 23: Hemolyzed. Interpret with caution U/L   Alanine Aminotransferase (ALT/SGPT): 31 U/L   eGFR if Non : 99  eGFR if : 115 mL/min/1.73M2  Complete Blood Count + Automated Diff (12.17.21 @ 19:04)   WBC Count: 9.40 K/uL   RBC Count: 6.02 M/uL   Hemoglobin: 13.4 g/dL   Hematocrit: 44.9 %   Mean Cell Volume: 74.6 fl   Mean Cell Hemoglobin: 22.3 pg   Mean Cell Hemoglobin Conc: 29.8 gm/dL   Red Cell Distrib Width: 19.5 %   Platelet Count - Automated: 389 K/uL   Auto Neutrophil #: 5.55 K/uL   Auto Lymphocyte #: 3.11 K/uL   Auto Monocyte #: 0.47 K/uL   Auto Eosinophil #: 0.19 K/uL   Auto Basophil #: 0.06 K/uL   Auto Neutrophil %: 59.1: Differential percentages must be correlated with absolute numbers for   clinical significance. %   Auto Lymphocyte %: 33.1 %   Auto Monocyte %: 5.0 %   Auto Eosinophil %: 2.0 %   Auto Basophil %: 0.6 %   Auto Immature Granulocyte %: 0.2: (Includes meta, myelo and promyelocytes) %   Nucleated RBC: 0 /100 WBCs
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
## 2021-12-23 NOTE — DISCHARGE NOTE BEHAVIORAL HEALTH - NSBHDCADDR1FT_A_CORE
[id number] [place] [id number] [address] [place] [id number]

## 2021-12-23 NOTE — PROGRESS NOTE BEHAVIORAL HEALTH - NSBHCHARTREVIEWINVESTIGATE_PSY_A_CORE FT
Ventricular Rate 107 BPM    Atrial Rate 107 BPM    P-R Interval 146 ms    QRS Duration 88 ms    Q-T Interval 330 ms    QTC Calculation(Bazett) 440 ms    P Axis 58 degrees    R Axis 54 degrees    T Axis -3 degrees    Diagnosis Line Sinus tachycardia  Nonspecific T wave abnormality  Abnormal ECG
< from: 12 Lead ECG (12.19.21 @ 17:55) >      Ventricular Rate 68 BPM    Atrial Rate 68 BPM    P-R Interval 150 ms    QRS Duration 100 ms    Q-T Interval 398 ms    QTC Calculation(Bazett) 423 ms    P Axis 39 degrees    R Axis 42 degrees    T Axis 39 degrees    Diagnosis Line Normal sinus rhythm  Normal ECG    < end of copied text >
Ventricular Rate 107 BPM    Atrial Rate 107 BPM    P-R Interval 146 ms    QRS Duration 88 ms    Q-T Interval 330 ms    QTC Calculation(Bazett) 440 ms    P Axis 58 degrees    R Axis 54 degrees    T Axis -3 degrees    Diagnosis Line Sinus tachycardia  Nonspecific T wave abnormality  Abnormal ECG
Ventricular Rate 107 BPM    Atrial Rate 107 BPM    P-R Interval 146 ms    QRS Duration 88 ms    Q-T Interval 330 ms    QTC Calculation(Bazett) 440 ms    P Axis 58 degrees    R Axis 54 degrees    T Axis -3 degrees    Diagnosis Line Sinus tachycardia  Nonspecific T wave abnormality  Abnormal ECG

## 2021-12-23 NOTE — PROGRESS NOTE BEHAVIORAL HEALTH - NSBHCHARTREVIEWIMAGING_PSY_A_CORE FT
EXAM:  XR HAND MIN 3 VIEWS RT                        PROCEDURE DATE:  12/04/2021    IMPRESSION:   No acute radiographic osseous pathology..    EXAM:  XR CHEST PORTABLE IMMED 1V                        PROCEDURE DATE:  12/01/2021    IMPRESSION:  Mild elevation of the right hemidiaphragm.  No focal consolidation or pleural effusion.
< from: Xray Hand 3 Views, Right (12.04.21 @ 17:00) >    IMPRESSION:   No acute radiographic osseous pathology..    < end of copied text >
EXAM:  XR HAND MIN 3 VIEWS RT                        PROCEDURE DATE:  12/04/2021    IMPRESSION:   No acute radiographic osseous pathology..    EXAM:  XR CHEST PORTABLE IMMED 1V                        PROCEDURE DATE:  12/01/2021    IMPRESSION:  Mild elevation of the right hemidiaphragm.  No focal consolidation or pleural effusion.
EXAM:  XR HAND MIN 3 VIEWS RT                        PROCEDURE DATE:  12/04/2021    IMPRESSION:   No acute radiographic osseous pathology..    EXAM:  XR CHEST PORTABLE IMMED 1V                        PROCEDURE DATE:  12/01/2021    IMPRESSION:  Mild elevation of the right hemidiaphragm.  No focal consolidation or pleural effusion.

## 2021-12-23 NOTE — DISCHARGE NOTE BEHAVIORAL HEALTH - NSBHDCSIGEVENTSFT_PSY_A_CORE
Pt had several episodes of agitation 2/2 needs not being met right away requiring seclusion room and IM PRN medications.

## 2021-12-23 NOTE — DISCHARGE NOTE BEHAVIORAL HEALTH - NSBHDCSUICFCTRMIT_PSY_A_CORE
Patient can rely on his [facility] support staff for social support. Patient has been medication compliant, not endorsing any side effects. Patient is future-oriented, looking to being home for the holidays. Patient verbalizing reasons for living. Patient with improved insight into events that led up to hospitalization and verbalizes improvement in impulse control since admission. Patient to use positive coping skills learned during the course of the inpatient hospitalization. Patient verbalized willingness to seek care in moment of crisis. Patient is agreeable that should he have any thoughts of hurting himself or others, he will call 911, return to the ED.

## 2021-12-23 NOTE — DISCHARGE NOTE BEHAVIORAL HEALTH - NS MD DC FALL RISK RISK
For information on Fall & Injury Prevention, visit: [email or web address] OR  [email or web address] OR  https://www.cdc.gov/steadi/patient.html

## 2021-12-23 NOTE — PROGRESS NOTE BEHAVIORAL HEALTH - NSBHADMITIPBHPROVFT_PSY_A_CORE
Dr. Duffy ([phone]) - number not in service, pending clarification of # by [facility] Director
Dr. Duffy ([phone]) - left msg and callback #
Dr. Duffy ([phone]) - left msg and callback # x2
Dr. Duffy ([phone]) - number not in service, pending clarification of # by [facility] Director

## 2021-12-23 NOTE — DISCHARGE NOTE BEHAVIORAL HEALTH - NSBHDCVIOLFCTRMIT_PSY_A_CORE
Patient can rely on his [facility] support staff for social support. Patient has been medication compliant, not endorsing any side effects. Patient is future-oriented, looking to being home for the holidays. Patient verbalizing reasons for living. Patient with improved insight into events that led up to hospitalization and verbalizes improvement in impulse control since admission. Patient to use positive coping skills learned during the course of the inpatient hospitalization. Patient verbalized willingness to seek care in moment of crisis. Patient is agreeable that should he have any thoughts of hurting himself or others, he will call 911, return to the ED.

## 2021-12-27 ENCOUNTER — EMERGENCY (EMERGENCY)
Facility: HOSPITAL | Age: 31
LOS: 1 days | Discharge: HOME | End: 2021-12-27
Attending: EMERGENCY MEDICINE
Payer: MEDICAID

## 2021-12-27 VITALS
RESPIRATION RATE: 20 BRPM | WEIGHT: 250 LBS | OXYGEN SATURATION: 96 % | TEMPERATURE: 99 F | SYSTOLIC BLOOD PRESSURE: 108 MMHG | DIASTOLIC BLOOD PRESSURE: 75 MMHG | HEIGHT: 71 IN | HEART RATE: 95 BPM

## 2021-12-27 PROCEDURE — 99284 EMERGENCY DEPT VISIT MOD MDM: CPT

## 2021-12-27 PROCEDURE — 93010 ELECTROCARDIOGRAM REPORT: CPT

## 2021-12-27 PROCEDURE — 93005 ELECTROCARDIOGRAM TRACING: CPT

## 2021-12-27 PROCEDURE — 99283 EMERGENCY DEPT VISIT LOW MDM: CPT

## 2021-12-27 NOTE — ED PROVIDER NOTE - HISTORY ATTESTATION, MLM
"Javad oJseph  Your lab results are attached. Please make a follow up appointment with Dr. Mitchell to discuss results.  In general though your Vitamin D has improved, your Liver enzymes, Complete Blood Count and Iron levels are stable and the testing for celiac/gluten is positive/elevated.    Please contact the office with any questions or concerns.    Vanessa Marie \"Remy\" ARIANE Dan  " I have reviewed and confirmed nurses' notes...

## 2021-12-27 NOTE — ED PROVIDER NOTE - NSICDXPASTMEDICALHX_GEN_ALL_CORE_FT
PAST MEDICAL HISTORY:  Asthma     Autism     Dyslipidemia     Enuresis     Factitious disorder     GERD (gastroesophageal reflux disease)     History of BPH     Hypothyroid     Hypothyroidism     Intellectual disability     Mood disorder     Myopia of both eyes     Urinary retention

## 2021-12-27 NOTE — ED PROVIDER NOTE - OBJECTIVE STATEMENT
31 y.o male from group home and PMHx of asthma, hyperthyroidism, obesity, GERD, Enuresis, Sun sensitivity, Impulse Control Disorder, Factitious Disorder, Myopia, Moderate Intellectual Disability, and Mitral Valve disease presents with 1 month of chest pain. Patient felt shortness of breath today so decided to call the ambulance, but he is very non-specific in elaboration. He mostly states that he doesn't want to talk about it and didn't provide much for history. His paper work was reviewed, but no further details about the call today was on it. He states his symptoms were worse today and that's why he called. 31 y.o male from group home and PMHx of asthma, hyperthyroidism, obesity, GERD, Enuresis, Sun sensitivity, Impulse Control Disorder, Factitious Disorder, Myopia, Moderate Intellectual Disability, and Mitral Valve disease presents with 1 month of chest pain. Patient felt shortness of breath for the pat month so decided to call the ambulance, but he is very non-specific in elaboration. He mostly states that he doesn't want to talk about it and didn't provide much for history. His paper work was reviewed, but no further details about the call today was on it. He states his symptoms were worse today and that's why he called.

## 2021-12-27 NOTE — ED ADULT TRIAGE NOTE - ARRIVAL INFO ADDITIONAL COMMENTS
Documents from [facility] in chart. [facility] contact Candy Thomas [phone],, Mother Liz Dunbar

## 2021-12-27 NOTE — ED PROVIDER NOTE - CLINICAL SUMMARY MEDICAL DECISION MAKING FREE TEXT BOX
Reviewed patient's medical record and it states that he was released from [facility] 2-3 days. Patient states he feels fine and wants to go home. Reviewed patient's medical record and it states that he was released from [facility] 2-3 days. Patient states he feels fine and wants to go home. he desires no further care at this time Reviewed patient's medical record and it states that he was released from [facility] holding 2-3 days. Patient states he feels fine and wants to go home. he desires no further care at this time, he is running around the ED witho no distress arguing with hospital security that he would like to leave, his home was contacted by nursing for pickup and safe discharge

## 2021-12-27 NOTE — ED ADULT NURSE REASSESSMENT NOTE - NS ED NURSE REASSESS COMMENT FT1
Patient seen and evaluated by DR Farah patient was explained that have to wait for transportation Patient seen and evaluated by DR Farah patient was explained that have to wait for transportation. Patient became agitative and combative, screaming stated want to leave not willing to stay and wait. patient scratched him self with a key minor bruise to left hand noted, cleaned and band-aid applied, MD aware. left ED with steady gait accompanied by Zain attendant from the facility.

## 2021-12-27 NOTE — ED ADULT TRIAGE NOTE - NSPATIENTFLAG_GEN_A_ER
Green (Altered Mental Status/Behavior) Green (Altered Mental Status/Behavior)/Purple DH (Discharge Huddle; Vulnerable Patient)

## 2021-12-28 PROBLEM — Z87.438 PERSONAL HISTORY OF OTHER DISEASES OF MALE GENITAL ORGANS: Chronic | Status: ACTIVE | Noted: 2021-12-19

## 2021-12-28 PROBLEM — E03.9 HYPOTHYROIDISM, UNSPECIFIED: Chronic | Status: ACTIVE | Noted: 2021-12-19

## 2021-12-29 ENCOUNTER — EMERGENCY (EMERGENCY)
Facility: HOSPITAL | Age: 31
LOS: 1 days | Discharge: HOME | End: 2021-12-29
Attending: EMERGENCY MEDICINE
Payer: MEDICAID

## 2021-12-29 VITALS
SYSTOLIC BLOOD PRESSURE: 128 MMHG | DIASTOLIC BLOOD PRESSURE: 83 MMHG | OXYGEN SATURATION: 97 % | RESPIRATION RATE: 18 BRPM | TEMPERATURE: 98 F | HEIGHT: 71 IN | HEART RATE: 92 BPM

## 2021-12-29 PROCEDURE — 99284 EMERGENCY DEPT VISIT MOD MDM: CPT

## 2021-12-29 PROCEDURE — 99283 EMERGENCY DEPT VISIT LOW MDM: CPT

## 2021-12-29 RX ORDER — RISPERIDONE 4 MG/1
1 TABLET ORAL ONCE
Refills: 0 | Status: COMPLETED | OUTPATIENT
Start: 2021-12-29 | End: 2021-12-29

## 2021-12-29 RX ORDER — MIRTAZAPINE 45 MG/1
15 TABLET, ORALLY DISINTEGRATING ORAL ONCE
Refills: 0 | Status: COMPLETED | OUTPATIENT
Start: 2021-12-29 | End: 2021-12-29

## 2021-12-29 RX ORDER — DIVALPROEX SODIUM 500 MG/1
500 TABLET, DELAYED RELEASE ORAL ONCE
Refills: 0 | Status: COMPLETED | OUTPATIENT
Start: 2021-12-29 | End: 2021-12-29

## 2021-12-29 RX ORDER — GUANFACINE 3 MG/1
1 TABLET, EXTENDED RELEASE ORAL ONCE
Refills: 0 | Status: COMPLETED | OUTPATIENT
Start: 2021-12-29 | End: 2021-12-29

## 2021-12-29 RX ADMIN — RISPERIDONE 1 MILLIGRAM(S): 4 TABLET ORAL at 21:32

## 2021-12-29 RX ADMIN — Medication 15 MILLIGRAM(S): at 21:31

## 2021-12-29 RX ADMIN — DIVALPROEX SODIUM 500 MILLIGRAM(S): 500 TABLET, DELAYED RELEASE ORAL at 21:15

## 2021-12-29 RX ADMIN — MIRTAZAPINE 15 MILLIGRAM(S): 45 TABLET, ORALLY DISINTEGRATING ORAL at 21:32

## 2021-12-29 RX ADMIN — Medication 1 MILLIGRAM(S): at 21:15

## 2021-12-29 NOTE — ED PROVIDER NOTE - CARE PLAN
Principal Discharge DX:	Autism spectrum disorder, requiring support, associated with another neurodevelopmental, mental, or behavioral disorder   1

## 2021-12-29 NOTE — ED PROVIDER NOTE - CLINICAL SUMMARY MEDICAL DECISION MAKING FREE TEXT BOX
Spoke to the group home person, she discussed the concerns that she had. Will discharge patient back to group home. Group home person request that he gets discharge with his medications. Will attempt to provide patient with meds before going home.

## 2021-12-29 NOTE — ED PROVIDER NOTE - OBJECTIVE STATEMENT
31 y.o male from group home and PMHx of Autism, asthma, hyperthyroidism, obesity, GERD, Enuresis, Sun sensitivity, Impulse Control Disorder, Factitious Disorder, Myopia, Moderate Intellectual Disability, and Mitral Valve. Patient was here on 12/27 for chest pain. Today he told the Triage nurse that he had diarrhea and a headache. When I asked him for his complaint, he denied these symptoms and wants to go back to his group home. Patient currently has no complaints. Denies of SI or HI.

## 2021-12-29 NOTE — ED PROVIDER NOTE - PRINCIPAL DIAGNOSIS
Autism spectrum disorder, requiring support, associated with another neurodevelopmental, mental, or behavioral disorder

## 2021-12-29 NOTE — ED PROVIDER NOTE - PATIENT PORTAL LINK FT
You can access the FollowMyHealth Patient Portal offered by [facility] by registering at the following website: [email or web address]. By joining [facility]’s FollowMyHealth portal, you will also be able to view your health information using other applications (apps) compatible with our system.

## 2021-12-30 DIAGNOSIS — F84.0 AUTISTIC DISORDER: ICD-10-CM

## 2021-12-30 DIAGNOSIS — F41.1 GENERALIZED ANXIETY DISORDER: ICD-10-CM

## 2021-12-30 DIAGNOSIS — F79 UNSPECIFIED INTELLECTUAL DISABILITIES: ICD-10-CM

## 2021-12-30 DIAGNOSIS — R45.850 HOMICIDAL IDEATIONS: ICD-10-CM

## 2021-12-30 DIAGNOSIS — J45.909 UNSPECIFIED ASTHMA, UNCOMPLICATED: ICD-10-CM

## 2021-12-30 DIAGNOSIS — F17.200 NICOTINE DEPENDENCE, UNSPECIFIED, UNCOMPLICATED: ICD-10-CM

## 2021-12-30 DIAGNOSIS — F43.10 POST-TRAUMATIC STRESS DISORDER, UNSPECIFIED: ICD-10-CM

## 2021-12-30 DIAGNOSIS — Z91.81 HISTORY OF FALLING: ICD-10-CM

## 2021-12-30 DIAGNOSIS — N40.1 BENIGN PROSTATIC HYPERPLASIA WITH LOWER URINARY TRACT SYMPTOMS: ICD-10-CM

## 2021-12-30 DIAGNOSIS — R45.851 SUICIDAL IDEATIONS: ICD-10-CM

## 2021-12-30 DIAGNOSIS — E78.5 HYPERLIPIDEMIA, UNSPECIFIED: ICD-10-CM

## 2021-12-30 DIAGNOSIS — F90.9 ATTENTION-DEFICIT HYPERACTIVITY DISORDER, UNSPECIFIED TYPE: ICD-10-CM

## 2021-12-30 DIAGNOSIS — R45.1 RESTLESSNESS AND AGITATION: ICD-10-CM

## 2021-12-30 DIAGNOSIS — Z78.1 PHYSICAL RESTRAINT STATUS: ICD-10-CM

## 2021-12-30 DIAGNOSIS — I10 ESSENTIAL (PRIMARY) HYPERTENSION: ICD-10-CM

## 2021-12-30 DIAGNOSIS — R33.8 OTHER RETENTION OF URINE: ICD-10-CM

## 2021-12-30 DIAGNOSIS — R00.0 TACHYCARDIA, UNSPECIFIED: ICD-10-CM

## 2021-12-30 DIAGNOSIS — F43.25 ADJUSTMENT DISORDER WITH MIXED DISTURBANCE OF EMOTIONS AND CONDUCT: ICD-10-CM

## 2021-12-30 DIAGNOSIS — Z92.22 PERSONAL HISTORY OF MONOCLONAL DRUG THERAPY: ICD-10-CM

## 2021-12-30 DIAGNOSIS — K21.9 GASTRO-ESOPHAGEAL REFLUX DISEASE WITHOUT ESOPHAGITIS: ICD-10-CM

## 2021-12-30 DIAGNOSIS — Z63.8 OTHER SPECIFIED PROBLEMS RELATED TO PRIMARY SUPPORT GROUP: ICD-10-CM

## 2021-12-30 DIAGNOSIS — Z91.52 PERSONAL HISTORY OF NONSUICIDAL SELF-HARM: ICD-10-CM

## 2021-12-30 DIAGNOSIS — E03.9 HYPOTHYROIDISM, UNSPECIFIED: ICD-10-CM

## 2021-12-30 DIAGNOSIS — M94.0 CHONDROCOSTAL JUNCTION SYNDROME [TIETZE]: ICD-10-CM

## 2021-12-30 SDOH — SOCIAL STABILITY - SOCIAL INSECURITY: OTHER SPECIFIED PROBLEMS RELATED TO PRIMARY SUPPORT GROUP: Z63.8

## 2022-01-03 ENCOUNTER — INPATIENT (INPATIENT)
Facility: HOSPITAL | Age: 32
LOS: 2 days | Discharge: HOME | DRG: 885 | End: 2022-01-06
Attending: INTERNAL MEDICINE | Admitting: INTERNAL MEDICINE
Payer: MEDICAID

## 2022-01-03 VITALS
DIASTOLIC BLOOD PRESSURE: 80 MMHG | RESPIRATION RATE: 16 BRPM | TEMPERATURE: 98 F | HEART RATE: 83 BPM | HEIGHT: 71 IN | OXYGEN SATURATION: 97 % | SYSTOLIC BLOOD PRESSURE: 120 MMHG

## 2022-01-03 DIAGNOSIS — Z29.9 ENCOUNTER FOR PROPHYLACTIC MEASURES, UNSPECIFIED: ICD-10-CM

## 2022-01-03 DIAGNOSIS — F79 UNSPECIFIED INTELLECTUAL DISABILITIES: ICD-10-CM

## 2022-01-03 DIAGNOSIS — F39 UNSPECIFIED MOOD [AFFECTIVE] DISORDER: ICD-10-CM

## 2022-01-03 DIAGNOSIS — R30.0 DYSURIA: ICD-10-CM

## 2022-01-03 DIAGNOSIS — J45.909 UNSPECIFIED ASTHMA, UNCOMPLICATED: ICD-10-CM

## 2022-01-03 DIAGNOSIS — E03.9 HYPOTHYROIDISM, UNSPECIFIED: ICD-10-CM

## 2022-01-03 DIAGNOSIS — U07.1 COVID-19: ICD-10-CM

## 2022-01-03 DIAGNOSIS — K21.9 GASTRO-ESOPHAGEAL REFLUX DISEASE WITHOUT ESOPHAGITIS: ICD-10-CM

## 2022-01-03 LAB
ALBUMIN SERPL ELPH-MCNC: 2.9 G/DL — LOW (ref 3.5–5)
ALBUMIN SERPL ELPH-MCNC: 3.1 G/DL — LOW (ref 3.5–5)
ALP SERPL-CCNC: 76 U/L — SIGNIFICANT CHANGE UP (ref 40–120)
ALP SERPL-CCNC: 76 U/L — SIGNIFICANT CHANGE UP (ref 40–120)
ALT FLD-CCNC: 18 U/L DA — SIGNIFICANT CHANGE UP (ref 10–60)
ALT FLD-CCNC: 21 U/L DA — SIGNIFICANT CHANGE UP (ref 10–60)
ANION GAP SERPL CALC-SCNC: 5 MMOL/L — SIGNIFICANT CHANGE UP (ref 5–17)
ANION GAP SERPL CALC-SCNC: 6 MMOL/L — SIGNIFICANT CHANGE UP (ref 5–17)
ANISOCYTOSIS BLD QL: SLIGHT — SIGNIFICANT CHANGE UP
APPEARANCE UR: CLEAR — SIGNIFICANT CHANGE UP
AST SERPL-CCNC: 15 U/L — SIGNIFICANT CHANGE UP (ref 10–40)
AST SERPL-CCNC: 24 U/L — SIGNIFICANT CHANGE UP (ref 10–40)
BACTERIA # UR AUTO: ABNORMAL /HPF
BASOPHILS # BLD AUTO: 0.02 K/UL — SIGNIFICANT CHANGE UP (ref 0–0.2)
BASOPHILS # BLD AUTO: 0.02 K/UL — SIGNIFICANT CHANGE UP (ref 0–0.2)
BASOPHILS NFR BLD AUTO: 0.3 % — SIGNIFICANT CHANGE UP (ref 0–2)
BASOPHILS NFR BLD AUTO: 0.3 % — SIGNIFICANT CHANGE UP (ref 0–2)
BILIRUB SERPL-MCNC: 0.2 MG/DL — SIGNIFICANT CHANGE UP (ref 0.2–1.2)
BILIRUB SERPL-MCNC: 0.3 MG/DL — SIGNIFICANT CHANGE UP (ref 0.2–1.2)
BILIRUB UR-MCNC: NEGATIVE — SIGNIFICANT CHANGE UP
BUN SERPL-MCNC: 19 MG/DL — HIGH (ref 7–18)
BUN SERPL-MCNC: 19 MG/DL — HIGH (ref 7–18)
CALCIUM SERPL-MCNC: 8.4 MG/DL — SIGNIFICANT CHANGE UP (ref 8.4–10.5)
CALCIUM SERPL-MCNC: 8.9 MG/DL — SIGNIFICANT CHANGE UP (ref 8.4–10.5)
CHLORIDE SERPL-SCNC: 106 MMOL/L — SIGNIFICANT CHANGE UP (ref 96–108)
CHLORIDE SERPL-SCNC: 107 MMOL/L — SIGNIFICANT CHANGE UP (ref 96–108)
CHOLEST SERPL-MCNC: 144 MG/DL — SIGNIFICANT CHANGE UP
CK SERPL-CCNC: 256 U/L — HIGH (ref 35–232)
CO2 SERPL-SCNC: 26 MMOL/L — SIGNIFICANT CHANGE UP (ref 22–31)
CO2 SERPL-SCNC: 27 MMOL/L — SIGNIFICANT CHANGE UP (ref 22–31)
COD CRY URNS QL: ABNORMAL /HPF
COLOR SPEC: YELLOW — SIGNIFICANT CHANGE UP
COMMENT - URINE: SIGNIFICANT CHANGE UP
CREAT SERPL-MCNC: 0.73 MG/DL — SIGNIFICANT CHANGE UP (ref 0.5–1.3)
CREAT SERPL-MCNC: 0.76 MG/DL — SIGNIFICANT CHANGE UP (ref 0.5–1.3)
D DIMER BLD IA.RAPID-MCNC: 197 NG/ML DDU — SIGNIFICANT CHANGE UP
D DIMER BLD IA.RAPID-MCNC: 230 NG/ML DDU — HIGH
DIFF PNL FLD: NEGATIVE — SIGNIFICANT CHANGE UP
ELLIPTOCYTES BLD QL SMEAR: SLIGHT — SIGNIFICANT CHANGE UP
EOSINOPHIL # BLD AUTO: 0.06 K/UL — SIGNIFICANT CHANGE UP (ref 0–0.5)
EOSINOPHIL # BLD AUTO: 0.1 K/UL — SIGNIFICANT CHANGE UP (ref 0–0.5)
EOSINOPHIL NFR BLD AUTO: 0.9 % — SIGNIFICANT CHANGE UP (ref 0–6)
EOSINOPHIL NFR BLD AUTO: 1.5 % — SIGNIFICANT CHANGE UP (ref 0–6)
EPI CELLS # UR: SIGNIFICANT CHANGE UP /HPF
ERYTHROCYTE [SEDIMENTATION RATE] IN BLOOD: 14 MM/HR — SIGNIFICANT CHANGE UP (ref 0–15)
ERYTHROCYTE [SEDIMENTATION RATE] IN BLOOD: 16 MM/HR — HIGH (ref 0–15)
GLUCOSE SERPL-MCNC: 100 MG/DL — HIGH (ref 70–99)
GLUCOSE SERPL-MCNC: 78 MG/DL — SIGNIFICANT CHANGE UP (ref 70–99)
GLUCOSE UR QL: NEGATIVE — SIGNIFICANT CHANGE UP
HCT VFR BLD CALC: 39.9 % — SIGNIFICANT CHANGE UP (ref 39–50)
HCT VFR BLD CALC: 41.6 % — SIGNIFICANT CHANGE UP (ref 39–50)
HDLC SERPL-MCNC: 34 MG/DL — LOW
HGB BLD-MCNC: 12.2 G/DL — LOW (ref 13–17)
HGB BLD-MCNC: 12.3 G/DL — LOW (ref 13–17)
HYALINE CASTS # UR AUTO: ABNORMAL /LPF
HYPOCHROMIA BLD QL: SLIGHT — SIGNIFICANT CHANGE UP
IMM GRANULOCYTES NFR BLD AUTO: 0.7 % — SIGNIFICANT CHANGE UP (ref 0–1.5)
IMM GRANULOCYTES NFR BLD AUTO: 0.9 % — SIGNIFICANT CHANGE UP (ref 0–1.5)
IRON SATN MFR SERPL: 43 UG/DL — LOW (ref 65–170)
IRON SATN MFR SERPL: 9 % — LOW (ref 20–55)
KETONES UR-MCNC: ABNORMAL
LACTATE SERPL-SCNC: 0.9 MMOL/L — SIGNIFICANT CHANGE UP (ref 0.7–2)
LDH SERPL L TO P-CCNC: 339 U/L — HIGH (ref 120–225)
LEUKOCYTE ESTERASE UR-ACNC: NEGATIVE — SIGNIFICANT CHANGE UP
LIDOCAIN IGE QN: 92 U/L — SIGNIFICANT CHANGE UP (ref 73–393)
LIPID PNL WITH DIRECT LDL SERPL: 91 MG/DL — SIGNIFICANT CHANGE UP
LYMPHOCYTES # BLD AUTO: 2.26 K/UL — SIGNIFICANT CHANGE UP (ref 1–3.3)
LYMPHOCYTES # BLD AUTO: 2.54 K/UL — SIGNIFICANT CHANGE UP (ref 1–3.3)
LYMPHOCYTES # BLD AUTO: 33.5 % — SIGNIFICANT CHANGE UP (ref 13–44)
LYMPHOCYTES # BLD AUTO: 37.9 % — SIGNIFICANT CHANGE UP (ref 13–44)
MAGNESIUM SERPL-MCNC: 2.2 MG/DL — SIGNIFICANT CHANGE UP (ref 1.6–2.6)
MANUAL SMEAR VERIFICATION: SIGNIFICANT CHANGE UP
MCHC RBC-ENTMCNC: 22.3 PG — LOW (ref 27–34)
MCHC RBC-ENTMCNC: 22.4 PG — LOW (ref 27–34)
MCHC RBC-ENTMCNC: 29.6 GM/DL — LOW (ref 32–36)
MCHC RBC-ENTMCNC: 30.6 GM/DL — LOW (ref 32–36)
MCV RBC AUTO: 73.3 FL — LOW (ref 80–100)
MCV RBC AUTO: 75.5 FL — LOW (ref 80–100)
MICROCYTES BLD QL: SLIGHT — SIGNIFICANT CHANGE UP
MONOCYTES # BLD AUTO: 0.64 K/UL — SIGNIFICANT CHANGE UP (ref 0–0.9)
MONOCYTES # BLD AUTO: 0.79 K/UL — SIGNIFICANT CHANGE UP (ref 0–0.9)
MONOCYTES NFR BLD AUTO: 11.8 % — SIGNIFICANT CHANGE UP (ref 2–14)
MONOCYTES NFR BLD AUTO: 9.5 % — SIGNIFICANT CHANGE UP (ref 2–14)
NEUTROPHILS # BLD AUTO: 3.2 K/UL — SIGNIFICANT CHANGE UP (ref 1.8–7.4)
NEUTROPHILS # BLD AUTO: 3.7 K/UL — SIGNIFICANT CHANGE UP (ref 1.8–7.4)
NEUTROPHILS NFR BLD AUTO: 47.8 % — SIGNIFICANT CHANGE UP (ref 43–77)
NEUTROPHILS NFR BLD AUTO: 54.9 % — SIGNIFICANT CHANGE UP (ref 43–77)
NITRITE UR-MCNC: NEGATIVE — SIGNIFICANT CHANGE UP
NON HDL CHOLESTEROL: 110 MG/DL — SIGNIFICANT CHANGE UP
NRBC # BLD: 0 /100 WBCS — SIGNIFICANT CHANGE UP (ref 0–0)
NRBC # BLD: 0 /100 WBCS — SIGNIFICANT CHANGE UP (ref 0–0)
OVALOCYTES BLD QL SMEAR: SLIGHT — SIGNIFICANT CHANGE UP
PH UR: 7 — SIGNIFICANT CHANGE UP (ref 5–8)
PHOSPHATE SERPL-MCNC: 3.5 MG/DL — SIGNIFICANT CHANGE UP (ref 2.5–4.5)
PLAT MORPH BLD: NORMAL — SIGNIFICANT CHANGE UP
PLATELET # BLD AUTO: 257 K/UL — SIGNIFICANT CHANGE UP (ref 150–400)
PLATELET # BLD AUTO: 264 K/UL — SIGNIFICANT CHANGE UP (ref 150–400)
PLATELET COUNT - ESTIMATE: NORMAL — SIGNIFICANT CHANGE UP
POIKILOCYTOSIS BLD QL AUTO: SLIGHT — SIGNIFICANT CHANGE UP
POTASSIUM SERPL-MCNC: 4.3 MMOL/L — SIGNIFICANT CHANGE UP (ref 3.5–5.3)
POTASSIUM SERPL-MCNC: 4.8 MMOL/L — SIGNIFICANT CHANGE UP (ref 3.5–5.3)
POTASSIUM SERPL-SCNC: 4.3 MMOL/L — SIGNIFICANT CHANGE UP (ref 3.5–5.3)
POTASSIUM SERPL-SCNC: 4.8 MMOL/L — SIGNIFICANT CHANGE UP (ref 3.5–5.3)
PROT SERPL-MCNC: 6.5 G/DL — SIGNIFICANT CHANGE UP (ref 6–8.3)
PROT SERPL-MCNC: 6.6 G/DL — SIGNIFICANT CHANGE UP (ref 6–8.3)
PROT UR-MCNC: 15
RBC # BLD: 5.44 M/UL — SIGNIFICANT CHANGE UP (ref 4.2–5.8)
RBC # BLD: 5.51 M/UL — SIGNIFICANT CHANGE UP (ref 4.2–5.8)
RBC # FLD: 18.2 % — HIGH (ref 10.3–14.5)
RBC # FLD: 18.6 % — HIGH (ref 10.3–14.5)
RBC BLD AUTO: ABNORMAL
RBC CASTS # UR COMP ASSIST: SIGNIFICANT CHANGE UP /HPF (ref 0–2)
SARS-COV-2 RNA SPEC QL NAA+PROBE: DETECTED
SODIUM SERPL-SCNC: 138 MMOL/L — SIGNIFICANT CHANGE UP (ref 135–145)
SODIUM SERPL-SCNC: 139 MMOL/L — SIGNIFICANT CHANGE UP (ref 135–145)
SP GR SPEC: 1.01 — SIGNIFICANT CHANGE UP (ref 1.01–1.02)
T4 AB SER-ACNC: 8.7 UG/DL — SIGNIFICANT CHANGE UP (ref 4.6–12)
TIBC SERPL-MCNC: 493 UG/DL — HIGH (ref 250–450)
TRIGL SERPL-MCNC: 95 MG/DL — SIGNIFICANT CHANGE UP
TROPONIN I, HIGH SENSITIVITY RESULT: 4.9 NG/L — SIGNIFICANT CHANGE UP
TSH SERPL-MCNC: 4.87 UU/ML — HIGH (ref 0.34–4.82)
UIBC SERPL-MCNC: 450 UG/DL — HIGH (ref 110–370)
UROBILINOGEN FLD QL: 4
WBC # BLD: 6.7 K/UL — SIGNIFICANT CHANGE UP (ref 3.8–10.5)
WBC # BLD: 6.74 K/UL — SIGNIFICANT CHANGE UP (ref 3.8–10.5)
WBC # FLD AUTO: 6.7 K/UL — SIGNIFICANT CHANGE UP (ref 3.8–10.5)
WBC # FLD AUTO: 6.74 K/UL — SIGNIFICANT CHANGE UP (ref 3.8–10.5)
WBC UR QL: SIGNIFICANT CHANGE UP /HPF (ref 0–5)

## 2022-01-03 PROCEDURE — 71045 X-RAY EXAM CHEST 1 VIEW: CPT | Mod: 26

## 2022-01-03 PROCEDURE — 93010 ELECTROCARDIOGRAM REPORT: CPT

## 2022-01-03 PROCEDURE — 99223 1ST HOSP IP/OBS HIGH 75: CPT

## 2022-01-03 PROCEDURE — 99285 EMERGENCY DEPT VISIT HI MDM: CPT

## 2022-01-03 RX ORDER — MIRTAZAPINE 45 MG/1
15 TABLET, ORALLY DISINTEGRATING ORAL AT BEDTIME
Refills: 0 | Status: DISCONTINUED | OUTPATIENT
Start: 2022-01-03 | End: 2022-01-06

## 2022-01-03 RX ORDER — RISPERIDONE 4 MG/1
2 TABLET ORAL
Refills: 0 | Status: DISCONTINUED | OUTPATIENT
Start: 2022-01-03 | End: 2022-01-06

## 2022-01-03 RX ORDER — ALBUTEROL 90 UG/1
2 AEROSOL, METERED ORAL EVERY 6 HOURS
Refills: 0 | Status: DISCONTINUED | OUTPATIENT
Start: 2022-01-03 | End: 2022-01-06

## 2022-01-03 RX ORDER — LEVOTHYROXINE SODIUM 125 MCG
50 TABLET ORAL DAILY
Refills: 0 | Status: DISCONTINUED | OUTPATIENT
Start: 2022-01-03 | End: 2022-01-06

## 2022-01-03 RX ORDER — PANTOPRAZOLE SODIUM 20 MG/1
40 TABLET, DELAYED RELEASE ORAL
Refills: 0 | Status: DISCONTINUED | OUTPATIENT
Start: 2022-01-03 | End: 2022-01-06

## 2022-01-03 RX ORDER — ATORVASTATIN CALCIUM 80 MG/1
10 TABLET, FILM COATED ORAL AT BEDTIME
Refills: 0 | Status: DISCONTINUED | OUTPATIENT
Start: 2022-01-03 | End: 2022-01-04

## 2022-01-03 RX ADMIN — ATORVASTATIN CALCIUM 10 MILLIGRAM(S): 80 TABLET, FILM COATED ORAL at 21:21

## 2022-01-03 RX ADMIN — MIRTAZAPINE 15 MILLIGRAM(S): 45 TABLET, ORALLY DISINTEGRATING ORAL at 21:20

## 2022-01-03 NOTE — H&P ADULT - ASSESSMENT
Patient is a 31yoM from [facility], w/ PMH of autism w/ moderate intellectual disability, asthma, hyperthyroidism, obesity, GERD, enuresis, impulse control disorder, factitious disorder, sent in from the skilled nursing due to staff unable to deal with patient behavior. Admitted for COVID PNA.
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
## 2022-01-03 NOTE — ED PROVIDER NOTE - OBJECTIVE STATEMENT
31 y.o male from [facility] and PMHx of Autism, asthma, hyperthyroidism, obesity, GERD, Enuresis, Sun sensitivity, Impulse Control Disorder, Factitious Disorder, Myopia, Moderate Intellectual Disability, and Mitral Valve presented to the ED for chest pain and blurry vision since 12/21 Fri.  Per pt, pt was admitted to [facility], pt was positive for covid there and DCd today, however came back to [facility] ED for chest pain and blurry vision. Chest pain is in L side, sharp, 9/10 intensity, no radiation, constant, worse with deep breath started on 12/31. Blurry vision started at the same time, but he can see somehow and can walk. Pt endorsed that he has dysuria for 2 months, sore throat, cough, loose stool, headache. Collateral was taken from [facility] in group home staff ([phone]), she states that pt was admitted to [facility] [place] inpatient psych for behavioral problem, found to have covid positive, and discharged today as they cannot deal with his behavioral problems. She begged to keep him there however they discharged. States pt doesn't follow the rule for quarantine , doesn't keep mask on, and put other member at risk. Since DC, pt is anxious and asks to keep him in the hospital till covid is cleared.  Pt vapes, occasinal drinker, no illegal drugs.  New allergy; clebrex 31 y.o male from [facility] and PMHx of Autism, asthma, hyperthyroidism, obesity, GERD, Enuresis, Sun sensitivity, Impulse Control Disorder, Factitious Disorder, Myopia, Moderate Intellectual Disability, and Mitral Valve presented to the ED for chest pain and blurry vision since 12/21 Fri.  Per pt, pt was admitted to [facility], pt was positive for covid there and DCd today, however came back to [facility] ED for chest pain and blurry vision. Chest pain is in L side, sharp, 9/10 intensity, no radiation, constant, worse with deep breath started on 12/31. Blurry vision started at the same time, but he can see somehow and can walk. Pt endorsed that he has dysuria for 2 months, sore throat, cough (started 12/31), loose stool, headache.   Collateral was taken from [place] in [facility] staff Marie(?) ([phone]), she states that pt was admitted to [place] inpatient psych for behavioral problem, found to have covid positive, and discharged today as they cannot deal with his behavioral problems. She begged to keep him there however they discharged. States pt doesn't follow the rule for quarantine , doesn't keep mask on, and put other member at risk. Since DC, pt is anxious and asks to keep him in the hospital till covid is cleared.  Pt vapes, occasinal drinker, no illegal drugs.  New allergy; clebrex 31 y.o male from [facility] and PMHx of Autism, asthma, hyperthyroidism, obesity, GERD, Enuresis, Sun sensitivity, Impulse Control Disorder, Factitious Disorder, Myopia, Moderate Intellectual Disability, and Mitral Valve presented to the ED for chest pain and blurry vision since 12/21 Fri.  Per pt, pt was admitted to [facility], pt was positive for covid there and DCd today, however came back to [facility] ED for chest pain and blurry vision. Chest pain is in L side, sharp, 9/10 intensity, no radiation, constant, worse with deep breath started on 12/31. Blurry vision started at the same time, but he can see somehow and can walk. Pt endorsed that he has dysuria for 2 months, sore throat, cough (started 12/31), loose stool, headache.   Collateral was taken from [place] in [facility] home staff Candy ([phone]), she states that pt was admitted to [facility] inpatient psych for behavioral problem, found to have covid positive, and discharged today as they cannot deal with his behavioral problems. She begged to keep him there however they discharged. States pt doesn't follow the rule for quarantine , doesn't keep mask on, and put other member at risk. Since DC, pt is anxious and asks to keep him in the hospital till covid is cleared.  Pt vapes, occasinal drinker, no illegal drugs.  New allergy; clebrex

## 2022-01-03 NOTE — ED PROVIDER NOTE - PROGRESS NOTE DETAILS
([place]) - pt apparently covid+ and cannot go to group home. Fu covid test to confirm results and admit. (No) - very high volume of covid testing delaying result, will admit. Endorsed to hospitalist Dr KAYLA Duffy and MAR
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
## 2022-01-03 NOTE — H&P ADULT - ATTENDING COMMENTS
This is a 30 y/o male with PMHx of moderate intellectual disability, autism with self injurious behavior (cutting, head banging), HAVNE, somatic symptom disorder, ADHD, GERD, asthma and hypothyroidism who presents from [facility] with vague complaints of chest pain, blurred vision and dysuria. Per ED physician, collateral history obtained from group home staff revealed that patient was recently discharged from [facility] inpatient psych unit where he was admitted for behavioral issues. Patient tested positive for COVID-19 and was reportedly discharged back to group home the same day. Per group home staff, patient has refused to wear a mask despite knowing he tested positive and was actively putting other group home members at risk of infection. On my evaluation, patient denies chest pain or blurred vision, but does endorse chronic dysuria.    Vital Signs Last 24 Hrs  T(C): 36.5 (04 Jan 2022 00:00), Max: 36.8 (03 Jan 2022 15:40)  T(F): 97.7 (04 Jan 2022 00:00), Max: 98.2 (03 Jan 2022 15:40)  HR: 69 (04 Jan 2022 00:00) (69 - 83)  BP: 95/55 (04 Jan 2022 00:00) (95/55 - 120/80)  BP(mean): --  RR: 18 (04 Jan 2022 00:00) (16 - 18)  SpO2: 96% (04 Jan 2022 00:00) (96% - 100%)    PEx  as above    A/P:  1. COVID-19 infection  - patient tests positive on 1/3 for SARS-CoV-2 (previously tested positive at [facility] within the past 5 days per group home  - patient afebrile, on room air, however group [place] is currently not accepting patient back in light of COVID-19 diagnosis and history of noncompliance with mask  - social work consult  - isolation precautions  - supportive care    2. Hypothyroidism  - TSH 4.87  - continue levothyroxine  - outpatient follow up of TFTs for dose titration if needed    3. Mood disorder (schizoaffective?)  - continue maintenance meds - risperdal, buspar and remeron  - supportive care    4. Abnormal lipid profile  - patient's lipid profile reviewed - low HDL noted  - given patient's age and otherwise absent CV risk factors, will d/c statin at this time  - can have lipid profile reviewed routinely outpatient    5. Dysuria  - patient complains of dysuria - similar symptom reported during past admission  - UA negative, afebrile, no leukocytosis  - symptoms may be somatic  - monitor for now    6. Chest pain  - patient no longer complaining of this at present  - low risk for ACS  - patient had similar complaints on prior admission in 12/2021  - PPI  - tylenol PRN This is a 32 y/o male with PMHx of moderate intellectual disability, autism with self injurious behavior (cutting, head banging), HAVEN, somatic symptom disorder, ADHD, GERD, asthma and hypothyroidism who presents from [facility] with vague complaints of chest pain, blurred vision and dysuria. Per ED physician, collateral history obtained from group home staff revealed that patient was recently discharged from [facility] inpatient psych unit where he was admitted for behavioral issues. Patient tested positive for COVID-19 and was reportedly discharged back to group home the same day. Per group home staff, patient has refused to wear a mask despite knowing he tested positive and was actively putting other group home members at risk of infection. On my evaluation, patient denies chest pain or blurred vision, but does endorse chronic dysuria.    Vital Signs Last 24 Hrs  T(C): 36.5 (04 Jan 2022 00:00), Max: 36.8 (03 Jan 2022 15:40)  T(F): 97.7 (04 Jan 2022 00:00), Max: 98.2 (03 Jan 2022 15:40)  HR: 69 (04 Jan 2022 00:00) (69 - 83)  BP: 95/55 (04 Jan 2022 00:00) (95/55 - 120/80)  BP(mean): --  RR: 18 (04 Jan 2022 00:00) (16 - 18)  SpO2: 96% (04 Jan 2022 00:00) (96% - 100%)    PEx  as above    A/P:  1. COVID-19 infection  - patient tests positive on 1/3 for SARS-CoV-2 (previously tested positive at [facility] within the past 5 days per group home  - patient afebrile, on room air, however group [place] is currently not accepting patient back in light of COVID-19 diagnosis and history of noncompliance with mask  - social work/case management consult for assistance with discharge planning/placement  - isolation precautions  - supportive care    2. Hypothyroidism  - TSH 4.87  - continue levothyroxine  - outpatient follow up of TFTs for dose titration if needed    3. Mood disorder (schizoaffective?)  - continue maintenance meds - risperdal, buspar and remeron  - supportive care    4. Abnormal lipid profile  - patient's lipid profile reviewed - low HDL noted  - given patient's age and otherwise absent CV risk factors, will d/c statin at this time  - can have lipid profile reviewed routinely outpatient    5. Dysuria  - patient complains of dysuria - similar symptom reported during past admission  - UA negative, afebrile, no leukocytosis  - symptoms may be somatic  - monitor for now    6. Chest pain  - patient no longer complaining of this at present  - low risk for ACS  - patient had similar complaints on prior admission in 12/2021  - PPI  - tylenol PRN This is a 32 y/o male with PMHx of moderate intellectual disability, autism with self injurious behavior (cutting, head banging), HAVEN, somatic symptom disorder, ADHD, GERD, asthma and hypothyroidism who presents from [facility] with vague complaints of chest pain, blurred vision and dysuria. Per ED physician, collateral history obtained from group home staff revealed that patient was recently discharged from [facility] inpatient psych unit where he was admitted for behavioral issues. Patient tested positive for COVID-19 and was reportedly discharged back to group home the same day. Per group home staff, patient has refused to wear a mask despite knowing he tested positive and was actively putting other group home members at risk of infection. On my evaluation, patient denies chest pain or blurred vision, but does endorse chronic dysuria.    Vital Signs Last 24 Hrs  T(C): 36.5 (04 Jan 2022 00:00), Max: 36.8 (03 Jan 2022 15:40)  T(F): 97.7 (04 Jan 2022 00:00), Max: 98.2 (03 Jan 2022 15:40)  HR: 69 (04 Jan 2022 00:00) (69 - 83)  BP: 95/55 (04 Jan 2022 00:00) (95/55 - 120/80)  BP(mean): --  RR: 18 (04 Jan 2022 00:00) (16 - 18)  SpO2: 96% (04 Jan 2022 00:00) (96% - 100%)    PEx  as above    A/P:  1. COVID-19 infection  - patient tests positive on 1/3 for SARS-CoV-2 (previously tested positive at [facility] within the past 5 days per group home  - patient afebrile, on room air, however group [place] is currently not accepting patient back in light of COVID-19 diagnosis and history of noncompliance with mask  - social work/case management consult for assistance with discharge planning/placement  - isolation precautions  - supportive care    2. Hypothyroidism  - TSH 4.87  - continue levothyroxine  - outpatient follow up of TFTs for dose titration if needed    3. Mood disorder (schizoaffective?)  - continue maintenance meds - risperdal, buspar and remeron  - supportive care    4. Abnormal lipid profile  - patient's lipid profile reviewed - low HDL noted  - given patient's age and otherwise absent CV risk factors, will d/c statin at this time  - can have lipid profile reviewed routinely outpatient    5. Dysuria  - patient complains of dysuria - similar symptom reported during past admission  - UA negative, afebrile, no leukocytosis  - symptoms may be somatic  - monitor for now    6. Chest pain  - patient no longer complaining of this at present  - low risk for ACS  - patient had similar complaints on prior admission in 12/2021  - PPI  - tylenol PRN    7. Asthma  - no active issues at present  - albuterol inhaler prn This is a 30 y/o male with PMHx of moderate intellectual disability, autism with self injurious behavior (cutting, head banging), HAVEN, somatic symptom disorder, ADHD, GERD, asthma and hypothyroidism who presents from [facility] with vague complaints of chest pain, blurred vision and dysuria. Per ED physician, collateral history obtained from group home staff revealed that patient was recently discharged from [facility] inpatient psych unit where he was admitted for behavioral issues. Patient tested positive for COVID-19 and was reportedly discharged back to group home the same day. Per group home staff, patient has refused to wear a mask despite knowing he tested positive and was actively putting other group home members at risk of infection. On my evaluation, patient denies chest pain or blurred vision, but does endorse chronic dysuria.    Vital Signs Last 24 Hrs  T(C): 36.5 (04 Jan 2022 00:00), Max: 36.8 (03 Jan 2022 15:40)  T(F): 97.7 (04 Jan 2022 00:00), Max: 98.2 (03 Jan 2022 15:40)  HR: 69 (04 Jan 2022 00:00) (69 - 83)  BP: 95/55 (04 Jan 2022 00:00) (95/55 - 120/80)  BP(mean): --  RR: 18 (04 Jan 2022 00:00) (16 - 18)  SpO2: 96% (04 Jan 2022 00:00) (96% - 100%)    PEx  as above    A/P:  1. COVID-19 infection  - patient tests positive on 1/3 for SARS-CoV-2 (previously tested positive at [facility] within the past 5 days per group home  - patient afebrile, on room air, however group [place] is currently not accepting patient back in light of COVID-19 diagnosis and history of noncompliance with mask  - social work/case management consult for assistance with discharge planning/placement  - isolation precautions  - supportive care    2. Hypothyroidism  - TSH 4.87  - continue levothyroxine  - outpatient follow up of TFTs for dose titration if needed    3. Mood disorder (schizoaffective?)  - continue maintenance meds - risperdal, buspar and remeron  - supportive care    4. Abnormal lipid profile  - patient's lipid profile reviewed - low HDL noted  - given patient's age and otherwise absent CV risk factors, will d/c statin at this time  - can have lipid profile reviewed routinely outpatient    5. Dysuria  - patient complains of dysuria - similar symptom reported during past admission  - UA negative, afebrile, no leukocytosis  - symptoms may be somatic  - monitor for now    6. Chest pain  - patient no longer complaining of this at present  - low risk for ACS  - patient had similar complaints on prior admission in 12/2021  - PPI  - tylenol PRN    7. Asthma  - no active issues at present  - albuterol inhaler prn    8. DVT ppx  - lovenox

## 2022-01-03 NOTE — ED PROVIDER NOTE - PHYSICAL EXAMINATION
Vital Signs Last 24 Hrs  T(C): 36.6 (03 Jan 2022 13:45), Max: 36.6 (03 Jan 2022 13:45)  T(F): 97.8 (03 Jan 2022 13:45), Max: 97.8 (03 Jan 2022 13:45)  HR: 83 (03 Jan 2022 13:45) (83 - 83)  BP: 120/80 (03 Jan 2022 13:45) (120/80 - 120/80)  BP(mean): --  RR: 16 (03 Jan 2022 13:45) (16 - 16)  SpO2: 97% (03 Jan 2022 13:45) (97% - 97%)      PHYSICAL EXAM:  GENERAL: NAD, well-groomed, well-developed  HEAD:  Atraumatic, Normocephalic  EYES: EOMI, PERRLA, conjunctiva and sclera clear  ENMT: No tonsillar erythema, exudates, or enlargement; Moist mucous membranes,  NERVOUS SYSTEM:  Alert and awake, Good concentration; Moving all extremities, CN 2-12 intact  CHEST/LUNG:  No rales, rhonchi, wheezing, or rubs  HEART: Regular rate and rhythm; No murmurs, rubs, or gallops  ABDOMEN: Soft, Nontender, Nondistended; Bowel sounds present  EXTREMITIES:  2+ Peripheral Pulses, No clubbing, cyanosis, or edema  LYMPH: No lymphadenopathy noted  SKIN: (+) multiple lesions on L arm, (+) multiple tattoos

## 2022-01-03 NOTE — H&P ADULT - HISTORY OF PRESENT ILLNESS
Patient is a 31yoM from [facility], w/ PMH of autism w/ moderate intellectual disability, asthma, hyperthyroidism, obesity, GERD, enuresis, impulse control disorder, factitious disorder, sent in from the [facility] due to staff unable to deal with patient behavior. Patient was recently discharged from [facility], where he was initially admitted for disrupted behavior. He was noted to have COVID at the time. Patient apparently refuses to wear mask at the group home.      and discharged today as they cannot deal with his behavioral problems. She begged to keep him there however they discharged. States pt doesn't follow the rule for quarantine , doesn't keep mask on, and put other member at risk. Since DC, pt is anxious and asks to keep him in the hospital till covid is cleared.  	Pt vapes, occasinal drinker, no illegal drugs.    · HPI Objective Statement: 31 y.o male from [facility] and PMHx of Autism, asthma, hyperthyroidism, obesity, GERD, Enuresis, Sun sensitivity, Impulse Control Disorder, Factitious Disorder, Myopia, Moderate Intellectual Disability, and Mitral Valve presented to the ED for chest pain and blurry vision since 12/21 Fri.  	Per pt, pt was admitted to [facility], pt was positive for covid there and DCd today, however came back to [facility] ED for chest pain and blurry vision. Chest pain is in L side, sharp, 9/10 intensity, no radiation, constant, worse with deep breath started on 12/31. Blurry vision started at the same time, but he can see somehow and can walk. Pt endorsed that he has dysuria for 2 months, sore throat, cough (started 12/31), loose stool, headache.   	Collateral was taken from Marie in group home staff [facility] ([phone]), she states that pt was admitted to [facility] inpatient psych for behavioral problem, found to have covid positive,   New allergy; clebrex   Patient is a 31yoM from [facility], w/ PMH of autism w/ moderate intellectual disability, asthma, hyperthyroidism, obesity, GERD, enuresis, impulse control disorder, factitious disorder, sent in from the assisted due to staff unable to deal with patient behavior. Patient was recently discharged from [facility], where he was initially admitted for disrupted behavior. He was noted to have COVID at the time. Patient apparently refuses to wear mask at the assisted. Patient complains of intermittent left chest pain and dysuria x2wk. He denies fever, chills, n/v sob, abdominal pain, or bowel movement changes.

## 2022-01-03 NOTE — ED PROVIDER NOTE - ATTENDING CONTRIBUTION TO CARE
I performed a face to face bedside interview with this patient regarding history of present illness, review of symptoms and past medical, social and family history.  I completed an independent physical examination.  I have independently reviewed any laboratory or radiologic studies. I have reviewed the resident's documentation and discussed the patient’s plan of care and disposition with the resident. I have documented any discrepancies between the resident's evaluation and my own.     Patient reports he tested positive for COVID recently and has had a sore throat and cough since 12/31. No fever, cp, sob, ap, n/v/d.     Gen: Well-developed, well-nourished, NAD, VS as noted by nursing. HEENT: NCAT, mmm   Chest: RRR, nl S1 and S2, no m/r/g. Resp: CTAB, no w/r/r  Abd: nl BS, soft, nt/nd. Ext: Warm, dry  Neuro: CN II-XII intact, normal and equal strength, sensation, and reflexes bilaterally, normal gait  Psych: AAOx3     MDM: Patient COVID positive, lives in a group home. Will need admission as group home will not accept patient back with COVID. Signed out to Dr. Sarabia. Will admit to hospitalist.

## 2022-01-03 NOTE — ED PROVIDER NOTE - NS ED ROS FT
REVIEW OF SYSTEMS:  CONSTITUTIONAL: No fever, weight loss, or fatigue  EYES: No eye pain, (+) blurry vision, no discharge  ENMT:  No difficulty hearing, tinnitus, vertigo; (+) throat pain  NECK: No pain or stiffness  RESPIRATORY: (+) cough, no wheezing, chills or hemoptysis; (+) shortness of breath  CARDIOVASCULAR: (+) chest pain, no dizziness, or leg swelling  GASTROINTESTINAL: No abdominal or epigastric pain. No nausea, vomiting, or hematemesis; (+) loose stool, no constipation. No melena or hematochezia.  GENITOURINARY: (+) dysuria, no frequency, hematuria, or incontinence  NEUROLOGICAL: (+) headaches, no memory loss, loss of strength, numbness, or tremors  SKIN: No itching, burning, rashes, (+) multiple lesions on left arm    LYMPH NODES: No enlarged glands  MUSCULOSKELETAL: No joint pain or swelling; No muscle, back, or extremity pain  ALLERY AND IMMUNOLOGIC: No hives or eczema

## 2022-01-04 DIAGNOSIS — D50.9 IRON DEFICIENCY ANEMIA, UNSPECIFIED: ICD-10-CM

## 2022-01-04 DIAGNOSIS — U07.1 COVID-19: ICD-10-CM

## 2022-01-04 LAB
A1C WITH ESTIMATED AVERAGE GLUCOSE RESULT: 6.1 % — HIGH (ref 4–5.6)
ESTIMATED AVERAGE GLUCOSE: 128 MG/DL — HIGH (ref 68–114)
FERRITIN SERPL-MCNC: 61 NG/ML — SIGNIFICANT CHANGE UP (ref 30–400)
FERRITIN SERPL-MCNC: 62 NG/ML — SIGNIFICANT CHANGE UP (ref 30–400)

## 2022-01-04 PROCEDURE — 99233 SBSQ HOSP IP/OBS HIGH 50: CPT

## 2022-01-04 RX ORDER — FERROUS SULFATE 325(65) MG
325 TABLET ORAL DAILY
Refills: 0 | Status: DISCONTINUED | OUTPATIENT
Start: 2022-01-04 | End: 2022-01-06

## 2022-01-04 RX ORDER — ENOXAPARIN SODIUM 100 MG/ML
40 INJECTION SUBCUTANEOUS DAILY
Refills: 0 | Status: DISCONTINUED | OUTPATIENT
Start: 2022-01-04 | End: 2022-01-06

## 2022-01-04 RX ADMIN — ENOXAPARIN SODIUM 40 MILLIGRAM(S): 100 INJECTION SUBCUTANEOUS at 11:22

## 2022-01-04 RX ADMIN — Medication 15 MILLIGRAM(S): at 06:07

## 2022-01-04 RX ADMIN — Medication 325 MILLIGRAM(S): at 11:22

## 2022-01-04 RX ADMIN — Medication 15 MILLIGRAM(S): at 18:57

## 2022-01-04 RX ADMIN — MIRTAZAPINE 15 MILLIGRAM(S): 45 TABLET, ORALLY DISINTEGRATING ORAL at 21:26

## 2022-01-04 RX ADMIN — PANTOPRAZOLE SODIUM 40 MILLIGRAM(S): 20 TABLET, DELAYED RELEASE ORAL at 06:07

## 2022-01-04 RX ADMIN — Medication 50 MICROGRAM(S): at 06:07

## 2022-01-04 RX ADMIN — RISPERIDONE 2 MILLIGRAM(S): 4 TABLET ORAL at 18:57

## 2022-01-04 RX ADMIN — RISPERIDONE 2 MILLIGRAM(S): 4 TABLET ORAL at 06:07

## 2022-01-04 NOTE — PROGRESS NOTE ADULT - ATTENDING COMMENTS
Patient is alert.  Cooperative.      Small excoriation on the left thenar eminence.     IMP:   COVID, asx           Mood disorder           intellectual disability  Plan:  Will add urine for GC/CT.            Iron supplement           Stool for blood           May need colonoscopy as outpatient. Patient is alert.  Cooperative.      Small excoriation on the left thenar eminence.     IMP:   COVID, asx           Mood disorder           intellectual disability  Plan:  Will add urine for GC/CT.            Iron supplement           Stool for blood           May need colonoscopy as outpatient.           No need for IV access           gauze dressing to left hand.
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
## 2022-01-04 NOTE — PROGRESS NOTE ADULT - SUBJECTIVE AND OBJECTIVE BOX
Patient is a 31y old  Male who presents with a chief complaint of COVID PNA (2022 19:08)    INTERVAL HPI/OVERNIGHT EVENTS: no new acute events overnight    REVIEW OF SYSTEMS:  CONSTITUTIONAL: No fever, chills  ENMT:  No difficulty hearing, no change in vision  NECK: No pain or stiffness  RESPIRATORY: No cough, SOB  CARDIOVASCULAR: No chest pain, palpitations  GASTROINTESTINAL: No abdominal pain. No nausea, vomiting, or diarrhea  GENITOURINARY: No dysuria  NEUROLOGICAL: No HA  SKIN: No itching, burning, rashes, or lesions   LYMPH NODES: No enlarged glands  ENDOCRINE: No heat or cold intolerance; No hair loss  MUSCULOSKELETAL: No joint pain or swelling; No muscle, back, or extremity pain  PSYCHIATRIC: No depression, anxiety  HEME/LYMPH: No easy bruising, or bleeding gums    T(C): 36.6 (22 @ 11:15), Max: 36.8 (22 @ 15:40)  HR: 67 (22 @ 11:15) (55 - 80)  BP: 101/66 (22 @ 11:15) (94/62 - 107/73)  RR: 17 (22 @ 11:15) (17 - 18)  SpO2: 97% (22 @ 11:15) (95% - 100%)  Wt(kg): --Vital Signs Last 24 Hrs  T(C): 36.6 (2022 11:15), Max: 36.8 (2022 15:40)  T(F): 97.8 (2022 11:15), Max: 98.2 (2022 15:40)  HR: 67 (2022 11:15) (55 - 80)  BP: 101/66 (2022 11:15) (94/62 - 107/73)  BP(mean): --  RR: 17 (2022 11:15) (17 - 18)  SpO2: 97% (2022 11:15) (95% - 100%)    MEDICATIONS  (STANDING):  busPIRone 15 milliGRAM(s) Oral two times a day  enoxaparin Injectable 40 milliGRAM(s) SubCutaneous daily  ferrous    sulfate 325 milliGRAM(s) Oral daily  levothyroxine 50 MICROGram(s) Oral daily  mirtazapine 15 milliGRAM(s) Oral at bedtime  pantoprazole    Tablet 40 milliGRAM(s) Oral before breakfast  risperiDONE   Tablet 2 milliGRAM(s) Oral two times a day    MEDICATIONS  (PRN):  ALBUTerol    90 MICROgram(s) HFA Inhaler 2 Puff(s) Inhalation every 6 hours PRN Wheezing      PHYSICAL EXAM:  GENERAL: NAD  EYES: clear conjunctiva; EOMI  ENMT: Moist mucous membranes  NECK: Supple, No JVD, Normal thyroid  CHEST/LUNG: Clear to auscultation bilaterally; No rales, rhonchi, wheezing, or rubs  HEART: S1, S2, Regular rate and rhythm  ABDOMEN: Soft, Nontender, Nondistended; Bowel sounds present  NEURO: Alert & Oriented X3  EXTREMITIES: No LE edema, no calf tenderness  LYMPH: No lymphadenopathy noted  SKIN: No rashes or lesions    Consultant(s) Notes Reviewed:  [x ] YES  [ ] NO  Care Discussed with Consultants/Other Providers [ x] YES  [ ] NO    LABS:                        12.3   6.70  )-----------( 257      ( 2022 21:42 )             41.6     01-    139  |  107  |  19<H>  ----------------------------<  78  4.8   |  26  |  0.76    Ca    8.9      2022 21:42  Phos  3.5     -  Mg     2.2     -    TPro  6.6  /  Alb  2.9<L>  /  TBili  0.3  /  DBili  x   /  AST  24  /  ALT  21  /  AlkPhos  76  -      CAPILLARY BLOOD GLUCOSE    Urinalysis Basic - ( 2022 21:46 )    Color: Yellow / Appearance: Clear / S.010 / pH: x  Gluc: x / Ketone: Trace  / Bili: Negative / Urobili: 4   Blood: x / Protein: 15 / Nitrite: Negative   Leuk Esterase: Negative / RBC: 0-2 /HPF / WBC 0-2 /HPF   Sq Epi: x / Non Sq Epi: Few /HPF / Bacteria: Few /HPF    RADIOLOGY & ADDITIONAL TESTS:    Imaging Personally Reviewed:  [x ] YES  [ ] NO  < from: Xray Chest 1 View- PORTABLE-Urgent (Xray Chest 1 View- PORTABLE-Urgent .) (22 @ 16:32) >  ACC: [id number] EXAM:  XR CHEST PORTABLE URGENT 1V                          PROCEDURE DATE:  2022          INTERPRETATION:  Clinical information: Chest pain. Covid.    TECHNIQUE: Frontal view of the chest.    COMPARISON: Prior study dated 2021.    FINDINGS: The lungs are clear. The cardiomediastinal silhouette is   normal. The visualized osseous structures are unremarkable.    IMPRESSION: Clear lungs, unchanged.    --- End of Report ---            FABIAN BARRERA MD; Attending Radiologist  This document has been electronically signed. Andrews  3 2022  4:38PM    < end of copied text >

## 2022-01-04 NOTE — PATIENT PROFILE ADULT - FALL HARM RISK - HARM RISK INTERVENTIONS
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
## 2022-01-05 LAB
ANION GAP SERPL CALC-SCNC: 4 MMOL/L — LOW (ref 5–17)
BUN SERPL-MCNC: 16 MG/DL — SIGNIFICANT CHANGE UP (ref 7–18)
CALCIUM SERPL-MCNC: 9.1 MG/DL — SIGNIFICANT CHANGE UP (ref 8.4–10.5)
CHLORIDE SERPL-SCNC: 110 MMOL/L — HIGH (ref 96–108)
CO2 SERPL-SCNC: 29 MMOL/L — SIGNIFICANT CHANGE UP (ref 22–31)
CREAT SERPL-MCNC: 0.78 MG/DL — SIGNIFICANT CHANGE UP (ref 0.5–1.3)
CULTURE RESULTS: SIGNIFICANT CHANGE UP
GLUCOSE SERPL-MCNC: 87 MG/DL — SIGNIFICANT CHANGE UP (ref 70–99)
HCT VFR BLD CALC: 41.6 % — SIGNIFICANT CHANGE UP (ref 39–50)
HGB BLD-MCNC: 12.3 G/DL — LOW (ref 13–17)
MCHC RBC-ENTMCNC: 22.4 PG — LOW (ref 27–34)
MCHC RBC-ENTMCNC: 29.6 GM/DL — LOW (ref 32–36)
MCV RBC AUTO: 75.9 FL — LOW (ref 80–100)
NRBC # BLD: 0 /100 WBCS — SIGNIFICANT CHANGE UP (ref 0–0)
PLATELET # BLD AUTO: 269 K/UL — SIGNIFICANT CHANGE UP (ref 150–400)
POTASSIUM SERPL-MCNC: 4.5 MMOL/L — SIGNIFICANT CHANGE UP (ref 3.5–5.3)
POTASSIUM SERPL-SCNC: 4.5 MMOL/L — SIGNIFICANT CHANGE UP (ref 3.5–5.3)
RBC # BLD: 5.48 M/UL — SIGNIFICANT CHANGE UP (ref 4.2–5.8)
RBC # FLD: 18.2 % — HIGH (ref 10.3–14.5)
SODIUM SERPL-SCNC: 143 MMOL/L — SIGNIFICANT CHANGE UP (ref 135–145)
SPECIMEN SOURCE: SIGNIFICANT CHANGE UP
TROPONIN I, HIGH SENSITIVITY RESULT: 4.2 NG/L — SIGNIFICANT CHANGE UP
WBC # BLD: 5.56 K/UL — SIGNIFICANT CHANGE UP (ref 3.8–10.5)
WBC # FLD AUTO: 5.56 K/UL — SIGNIFICANT CHANGE UP (ref 3.8–10.5)

## 2022-01-05 PROCEDURE — 99233 SBSQ HOSP IP/OBS HIGH 50: CPT | Mod: GC

## 2022-01-05 RX ADMIN — Medication 325 MILLIGRAM(S): at 15:51

## 2022-01-05 RX ADMIN — Medication 15 MILLIGRAM(S): at 18:01

## 2022-01-05 RX ADMIN — RISPERIDONE 2 MILLIGRAM(S): 4 TABLET ORAL at 18:01

## 2022-01-05 RX ADMIN — RISPERIDONE 2 MILLIGRAM(S): 4 TABLET ORAL at 05:49

## 2022-01-05 RX ADMIN — Medication 15 MILLIGRAM(S): at 05:48

## 2022-01-05 RX ADMIN — Medication 50 MICROGRAM(S): at 05:48

## 2022-01-05 RX ADMIN — PANTOPRAZOLE SODIUM 40 MILLIGRAM(S): 20 TABLET, DELAYED RELEASE ORAL at 06:35

## 2022-01-05 RX ADMIN — ENOXAPARIN SODIUM 40 MILLIGRAM(S): 100 INJECTION SUBCUTANEOUS at 15:50

## 2022-01-05 RX ADMIN — MIRTAZAPINE 15 MILLIGRAM(S): 45 TABLET, ORALLY DISINTEGRATING ORAL at 21:21

## 2022-01-05 NOTE — PROGRESS NOTE ADULT - SUBJECTIVE AND OBJECTIVE BOX
PGY-1 Progress Note discussed with attending    PAGER #: [[phone]] TILL 5:00 PM  PLEASE CONTACT ON CALL TEAM:  - On Call Team (Please refer to Leah) FROM 5:00 PM - 8:30PM  - Nightfloat Team FROM 8:30 -7:30 AM    CHIEF COMPLAINT & BRIEF HOSPITAL COURSE:      INTERVAL HPI/OVERNIGHT EVENTS:       REVIEW OF SYSTEMS:  CONSTITUTIONAL: No fever, weight loss, or fatigue  RESPIRATORY: No cough, wheezing, chills or hemoptysis; No shortness of breath  CARDIOVASCULAR: No chest pain, palpitations, dizziness, or leg swelling  GASTROINTESTINAL: No abdominal pain. No nausea, vomiting, or hematemesis; No diarrhea or constipation. No melena or hematochezia.  GENITOURINARY: No dysuria or hematuria, urinary frequency  NEUROLOGICAL: No headaches, memory loss, loss of strength, numbness, or tremors  SKIN: No itching, burning, rashes, or lesions     MEDICATIONS  (STANDING):  busPIRone 15 milliGRAM(s) Oral two times a day  enoxaparin Injectable 40 milliGRAM(s) SubCutaneous daily  ferrous    sulfate 325 milliGRAM(s) Oral daily  levothyroxine 50 MICROGram(s) Oral daily  mirtazapine 15 milliGRAM(s) Oral at bedtime  pantoprazole    Tablet 40 milliGRAM(s) Oral before breakfast  risperiDONE   Tablet 2 milliGRAM(s) Oral two times a day    MEDICATIONS  (PRN):  ALBUTerol    90 MICROgram(s) HFA Inhaler 2 Puff(s) Inhalation every 6 hours PRN Wheezing      Vital Signs Last 24 Hrs  T(C): 36.7 (05 Jan 2022 04:34), Max: 36.8 (04 Jan 2022 21:14)  T(F): 98.1 (05 Jan 2022 04:34), Max: 98.2 (04 Jan 2022 21:14)  HR: 60 (05 Jan 2022 04:34) (55 - 67)  BP: 99/60 (05 Jan 2022 04:34) (93/64 - 101/66)  BP(mean): --  RR: 18 (05 Jan 2022 04:34) (17 - 18)  SpO2: 100% (05 Jan 2022 04:34) (97% - 100%)    PHYSICAL EXAMINATION:  GENERAL: NAD, well built  HEAD:  Atraumatic, Normocephalic  EYES:  conjunctiva and sclera clear  NECK: Supple, No JVD, Normal thyroid  CHEST/LUNG: Clear to auscultation. Clear to percussion bilaterally; No rales, rhonchi, wheezing, or rubs  HEART: Regular rate and rhythm; No murmurs, rubs, or gallops  ABDOMEN: Soft, Nontender, Nondistended; Bowel sounds present, no pain or masses on palpation  NERVOUS SYSTEM:  Alert & Oriented X3  : voiding well  EXTREMITIES:  2+ Peripheral Pulses, No clubbing, cyanosis, or edema  SKIN: warm dry                          12.3   5.56  )-----------( 269      ( 05 Jan 2022 06:42 )             41.6     01-05    143  |  110<H>  |  16  ----------------------------<  87  4.5   |  29  |  0.78    Ca    9.1      05 Jan 2022 06:42  Phos  3.5     01-03  Mg     2.2     01-03    TPro  6.6  /  Alb  2.9<L>  /  TBili  0.3  /  DBili  x   /  AST  24  /  ALT  21  /  AlkPhos  76  01-03    LIVER FUNCTIONS - ( 03 Jan 2022 21:42 )  Alb: 2.9 g/dL / Pro: 6.6 g/dL / ALK PHOS: 76 U/L / ALT: 21 U/L DA / AST: 24 U/L / GGT: x           CARDIAC MARKERS ( 03 Jan 2022 15:37 )  x     / x     / 256 U/L / x     / x              I&O's Summary        Culture - Urine (collected 04 Jan 2022 04:30)  Source: Clean Catch Clean Catch (Midstream)  Final Report (05 Jan 2022 00:07):    <10,000 CFU/mL Normal Urogenital Sarah        CAPILLARY BLOOD GLUCOSE      RADIOLOGY & ADDITIONAL TESTS:                   PGY-1 Progress Note discussed with attending    PAGER #: [[phone]] TILL 5:00 PM  PLEASE CONTACT ON CALL TEAM:  - On Call Team (Please refer to Leah) FROM 5:00 PM - 8:30PM  - Nightfloat Team FROM 8:30 -7:30 AM    CHIEF COMPLAINT & BRIEF HOSPITAL COURSE:    Patient is a 31yoM from [facility], w/ PMH of autism w/ moderate intellectual disability, asthma, hyperthyroidism, obesity, GERD, enuresis, impulse control disorder, factitious disorder, sent in from the [facility] due to staff unable to deal with patient behavior. Patient was recently discharged from [facility], where he was initially admitted for disrupted behavior. He was noted to have COVID at the time. Patient apparently refuses to wear mask at the [facility]. Patient complains of intermittent left chest pain and dysuria x2wk. He denies fever, chills, n/v sob, abdominal pain, or bowel movement changes.    INTERVAL HPI/OVERNIGHT EVENTS:     Patient was examined at bedside, AAOx3, stable, NAD. He is calm and comfortable, not in distress. Complains of burning urination but urinalysis is negative. No significant events overnight.    REVIEW OF SYSTEMS:  CONSTITUTIONAL: No fever, weight loss, or fatigue  RESPIRATORY: No cough, wheezing, chills or hemoptysis; No shortness of breath  CARDIOVASCULAR: No chest pain, palpitations, dizziness, or leg swelling  GASTROINTESTINAL: No abdominal pain. No nausea, vomiting, or hematemesis; No diarrhea or constipation. No melena or hematochezia.  GENITOURINARY: (+) dysuria. No hematuria, urinary frequency  NEUROLOGICAL: No headaches, memory loss, loss of strength, numbness, or tremors  SKIN: No itching, burning, rashes, or lesions     MEDICATIONS  (STANDING):  busPIRone 15 milliGRAM(s) Oral two times a day  enoxaparin Injectable 40 milliGRAM(s) SubCutaneous daily  ferrous    sulfate 325 milliGRAM(s) Oral daily  levothyroxine 50 MICROGram(s) Oral daily  mirtazapine 15 milliGRAM(s) Oral at bedtime  pantoprazole    Tablet 40 milliGRAM(s) Oral before breakfast  risperiDONE   Tablet 2 milliGRAM(s) Oral two times a day    MEDICATIONS  (PRN):  ALBUTerol    90 MICROgram(s) HFA Inhaler 2 Puff(s) Inhalation every 6 hours PRN Wheezing      Vital Signs Last 24 Hrs  T(C): 36.7 (05 Jan 2022 04:34), Max: 36.8 (04 Jan 2022 21:14)  T(F): 98.1 (05 Jan 2022 04:34), Max: 98.2 (04 Jan 2022 21:14)  HR: 60 (05 Jan 2022 04:34) (55 - 67)  BP: 99/60 (05 Jan 2022 04:34) (93/64 - 101/66)  BP(mean): --  RR: 18 (05 Jan 2022 04:34) (17 - 18)  SpO2: 100% (05 Jan 2022 04:34) (97% - 100%)    PHYSICAL EXAMINATION:  GENERAL: NAD, well built  HEAD:  Atraumatic, Normocephalic  EYES:  conjunctiva and sclera clear  NECK: Supple, No JVD, Normal thyroid  CHEST/LUNG: Clear to auscultation. Clear to percussion bilaterally; No rales, rhonchi, wheezing, or rubs  HEART: Regular rate and rhythm; No murmurs, rubs, or gallops  ABDOMEN: Soft, Nontender, Nondistended; Bowel sounds present, no pain or masses on palpation  NERVOUS SYSTEM:  Alert & Oriented X3  : voiding well  EXTREMITIES:  2+ Peripheral Pulses, No clubbing, cyanosis, or edema  SKIN: warm dry                          12.3   5.56  )-----------( 269      ( 05 Jan 2022 06:42 )             41.6     01-05    143  |  110<H>  |  16  ----------------------------<  87  4.5   |  29  |  0.78    Ca    9.1      05 Jan 2022 06:42  Phos  3.5     01-03  Mg     2.2     01-03    TPro  6.6  /  Alb  2.9<L>  /  TBili  0.3  /  DBili  x   /  AST  24  /  ALT  21  /  AlkPhos  76  01-03    LIVER FUNCTIONS - ( 03 Jan 2022 21:42 )  Alb: 2.9 g/dL / Pro: 6.6 g/dL / ALK PHOS: 76 U/L / ALT: 21 U/L DA / AST: 24 U/L / GGT: x           CARDIAC MARKERS ( 03 Jan 2022 15:37 )  x     / x     / 256 U/L / x     / x              I&O's Summary        Culture - Urine (collected 04 Jan 2022 04:30)  Source: Clean Catch Clean Catch (Midstream)  Final Report (05 Jan 2022 00:07):    <10,000 CFU/mL Normal Urogenital Sarah        CAPILLARY BLOOD GLUCOSE      RADIOLOGY & ADDITIONAL TESTS:

## 2022-01-05 NOTE — PROGRESS NOTE ADULT - ATTENDING COMMENTS
Patient continues to be calm and more cooperative.   Vital Signs Last 24 Hrs  T(C): 36.9 (05 Jan 2022 16:28), Max: 36.9 (05 Jan 2022 16:28)  T(F): 98.4 (05 Jan 2022 16:28), Max: 98.4 (05 Jan 2022 16:28)  HR: 61 (05 Jan 2022 16:28) (55 - 61)  BP: 113/73 (05 Jan 2022 16:28) (93/64 - 113/73)  BP(mean): --  RR: 18 (05 Jan 2022 16:28) (18 - 18)  SpO2: 100% (05 Jan 2022 16:28) (98% - 100%)    IMP:  COVID, asymptomatic          underlying behavioral issues, improving  Plan:  Repeat COVID PCR.

## 2022-01-06 ENCOUNTER — TRANSCRIPTION ENCOUNTER (OUTPATIENT)
Age: 32
End: 2022-01-06

## 2022-01-06 ENCOUNTER — EMERGENCY (EMERGENCY)
Facility: HOSPITAL | Age: 32
LOS: 1 days | Discharge: HOME | End: 2022-01-06
Attending: EMERGENCY MEDICINE
Payer: MEDICAID

## 2022-01-06 VITALS
RESPIRATION RATE: 17 BRPM | HEART RATE: 76 BPM | DIASTOLIC BLOOD PRESSURE: 82 MMHG | TEMPERATURE: 98 F | HEIGHT: 71 IN | SYSTOLIC BLOOD PRESSURE: 120 MMHG | WEIGHT: 229.94 LBS | OXYGEN SATURATION: 100 %

## 2022-01-06 VITALS
HEART RATE: 63 BPM | RESPIRATION RATE: 18 BRPM | TEMPERATURE: 97 F | DIASTOLIC BLOOD PRESSURE: 62 MMHG | SYSTOLIC BLOOD PRESSURE: 110 MMHG | OXYGEN SATURATION: 99 %

## 2022-01-06 LAB
ALBUMIN SERPL ELPH-MCNC: 3.5 G/DL — SIGNIFICANT CHANGE UP (ref 3.5–5)
ALP SERPL-CCNC: 91 U/L — SIGNIFICANT CHANGE UP (ref 40–120)
ALT FLD-CCNC: 32 U/L DA — SIGNIFICANT CHANGE UP (ref 10–60)
ANION GAP SERPL CALC-SCNC: 5 MMOL/L — SIGNIFICANT CHANGE UP (ref 5–17)
AST SERPL-CCNC: 19 U/L — SIGNIFICANT CHANGE UP (ref 10–40)
BASOPHILS # BLD AUTO: 0.01 K/UL — SIGNIFICANT CHANGE UP (ref 0–0.2)
BASOPHILS NFR BLD AUTO: 0.1 % — SIGNIFICANT CHANGE UP (ref 0–2)
BILIRUB SERPL-MCNC: 0.3 MG/DL — SIGNIFICANT CHANGE UP (ref 0.2–1.2)
BUN SERPL-MCNC: 10 MG/DL — SIGNIFICANT CHANGE UP (ref 7–18)
CALCIUM SERPL-MCNC: 9.2 MG/DL — SIGNIFICANT CHANGE UP (ref 8.4–10.5)
CHLORIDE SERPL-SCNC: 109 MMOL/L — HIGH (ref 96–108)
CO2 SERPL-SCNC: 28 MMOL/L — SIGNIFICANT CHANGE UP (ref 22–31)
CREAT SERPL-MCNC: 0.87 MG/DL — SIGNIFICANT CHANGE UP (ref 0.5–1.3)
EOSINOPHIL # BLD AUTO: 0.06 K/UL — SIGNIFICANT CHANGE UP (ref 0–0.5)
EOSINOPHIL NFR BLD AUTO: 0.7 % — SIGNIFICANT CHANGE UP (ref 0–6)
GLUCOSE SERPL-MCNC: 99 MG/DL — SIGNIFICANT CHANGE UP (ref 70–99)
HCT VFR BLD CALC: 43.8 % — SIGNIFICANT CHANGE UP (ref 39–50)
HGB BLD-MCNC: 13.2 G/DL — SIGNIFICANT CHANGE UP (ref 13–17)
IMM GRANULOCYTES NFR BLD AUTO: 0.6 % — SIGNIFICANT CHANGE UP (ref 0–1.5)
LYMPHOCYTES # BLD AUTO: 1.5 K/UL — SIGNIFICANT CHANGE UP (ref 1–3.3)
LYMPHOCYTES # BLD AUTO: 18.3 % — SIGNIFICANT CHANGE UP (ref 13–44)
MCHC RBC-ENTMCNC: 22.5 PG — LOW (ref 27–34)
MCHC RBC-ENTMCNC: 30.1 GM/DL — LOW (ref 32–36)
MCV RBC AUTO: 74.7 FL — LOW (ref 80–100)
MONOCYTES # BLD AUTO: 0.61 K/UL — SIGNIFICANT CHANGE UP (ref 0–0.9)
MONOCYTES NFR BLD AUTO: 7.4 % — SIGNIFICANT CHANGE UP (ref 2–14)
NEUTROPHILS # BLD AUTO: 5.96 K/UL — SIGNIFICANT CHANGE UP (ref 1.8–7.4)
NEUTROPHILS NFR BLD AUTO: 72.9 % — SIGNIFICANT CHANGE UP (ref 43–77)
NRBC # BLD: 0 /100 WBCS — SIGNIFICANT CHANGE UP (ref 0–0)
PLATELET # BLD AUTO: 341 K/UL — SIGNIFICANT CHANGE UP (ref 150–400)
POTASSIUM SERPL-MCNC: 3.9 MMOL/L — SIGNIFICANT CHANGE UP (ref 3.5–5.3)
POTASSIUM SERPL-SCNC: 3.9 MMOL/L — SIGNIFICANT CHANGE UP (ref 3.5–5.3)
PROT SERPL-MCNC: 7.2 G/DL — SIGNIFICANT CHANGE UP (ref 6–8.3)
RBC # BLD: 5.86 M/UL — HIGH (ref 4.2–5.8)
RBC # FLD: 18 % — HIGH (ref 10.3–14.5)
SODIUM SERPL-SCNC: 142 MMOL/L — SIGNIFICANT CHANGE UP (ref 135–145)
TROPONIN I, HIGH SENSITIVITY RESULT: 4.7 NG/L — SIGNIFICANT CHANGE UP
WBC # BLD: 8.19 K/UL — SIGNIFICANT CHANGE UP (ref 3.8–10.5)
WBC # FLD AUTO: 8.19 K/UL — SIGNIFICANT CHANGE UP (ref 3.8–10.5)

## 2022-01-06 PROCEDURE — 96372 THER/PROPH/DIAG INJ SC/IM: CPT

## 2022-01-06 PROCEDURE — 83605 ASSAY OF LACTIC ACID: CPT

## 2022-01-06 PROCEDURE — 93010 ELECTROCARDIOGRAM REPORT: CPT

## 2022-01-06 PROCEDURE — 83735 ASSAY OF MAGNESIUM: CPT

## 2022-01-06 PROCEDURE — 87086 URINE CULTURE/COLONY COUNT: CPT

## 2022-01-06 PROCEDURE — 87635 SARS-COV-2 COVID-19 AMP PRB: CPT

## 2022-01-06 PROCEDURE — 99284 EMERGENCY DEPT VISIT MOD MDM: CPT

## 2022-01-06 PROCEDURE — 81001 URINALYSIS AUTO W/SCOPE: CPT

## 2022-01-06 PROCEDURE — 84484 ASSAY OF TROPONIN QUANT: CPT

## 2022-01-06 PROCEDURE — 99285 EMERGENCY DEPT VISIT HI MDM: CPT

## 2022-01-06 PROCEDURE — 84100 ASSAY OF PHOSPHORUS: CPT

## 2022-01-06 PROCEDURE — 36415 COLL VENOUS BLD VENIPUNCTURE: CPT

## 2022-01-06 PROCEDURE — 85379 FIBRIN DEGRADATION QUANT: CPT

## 2022-01-06 PROCEDURE — 80053 COMPREHEN METABOLIC PANEL: CPT

## 2022-01-06 PROCEDURE — 85025 COMPLETE CBC W/AUTO DIFF WBC: CPT

## 2022-01-06 PROCEDURE — 82550 ASSAY OF CK (CPK): CPT

## 2022-01-06 PROCEDURE — 80307 DRUG TEST PRSMV CHEM ANLYZR: CPT

## 2022-01-06 PROCEDURE — 87491 CHLMYD TRACH DNA AMP PROBE: CPT

## 2022-01-06 PROCEDURE — 82728 ASSAY OF FERRITIN: CPT

## 2022-01-06 PROCEDURE — 84436 ASSAY OF TOTAL THYROXINE: CPT

## 2022-01-06 PROCEDURE — 83036 HEMOGLOBIN GLYCOSYLATED A1C: CPT

## 2022-01-06 PROCEDURE — 83690 ASSAY OF LIPASE: CPT

## 2022-01-06 PROCEDURE — 85027 COMPLETE CBC AUTOMATED: CPT

## 2022-01-06 PROCEDURE — 90471 IMMUNIZATION ADMIN: CPT

## 2022-01-06 PROCEDURE — 80048 BASIC METABOLIC PNL TOTAL CA: CPT

## 2022-01-06 PROCEDURE — 87591 N.GONORRHOEAE DNA AMP PROB: CPT

## 2022-01-06 PROCEDURE — 73130 X-RAY EXAM OF HAND: CPT

## 2022-01-06 PROCEDURE — 93005 ELECTROCARDIOGRAM TRACING: CPT

## 2022-01-06 PROCEDURE — 83550 IRON BINDING TEST: CPT

## 2022-01-06 PROCEDURE — 99238 HOSP IP/OBS DSCHRG MGMT 30/<: CPT | Mod: GC

## 2022-01-06 PROCEDURE — 85652 RBC SED RATE AUTOMATED: CPT

## 2022-01-06 PROCEDURE — 90715 TDAP VACCINE 7 YRS/> IM: CPT

## 2022-01-06 PROCEDURE — 71045 X-RAY EXAM CHEST 1 VIEW: CPT | Mod: 26

## 2022-01-06 PROCEDURE — 71045 X-RAY EXAM CHEST 1 VIEW: CPT

## 2022-01-06 PROCEDURE — 99285 EMERGENCY DEPT VISIT HI MDM: CPT | Mod: 25

## 2022-01-06 PROCEDURE — 84443 ASSAY THYROID STIM HORMONE: CPT

## 2022-01-06 PROCEDURE — 83540 ASSAY OF IRON: CPT

## 2022-01-06 PROCEDURE — 80061 LIPID PANEL: CPT

## 2022-01-06 PROCEDURE — 83615 LACTATE (LD) (LDH) ENZYME: CPT

## 2022-01-06 RX ORDER — FERROUS SULFATE 325(65) MG
1 TABLET ORAL
Qty: 0 | Refills: 0 | DISCHARGE
Start: 2022-01-06

## 2022-01-06 RX ORDER — ENOXAPARIN SODIUM 100 MG/ML
40 INJECTION SUBCUTANEOUS
Qty: 0 | Refills: 0 | DISCHARGE
Start: 2022-01-06

## 2022-01-06 RX ADMIN — RISPERIDONE 2 MILLIGRAM(S): 4 TABLET ORAL at 05:25

## 2022-01-06 RX ADMIN — Medication 325 MILLIGRAM(S): at 14:04

## 2022-01-06 RX ADMIN — ENOXAPARIN SODIUM 40 MILLIGRAM(S): 100 INJECTION SUBCUTANEOUS at 14:03

## 2022-01-06 RX ADMIN — Medication 50 MICROGRAM(S): at 05:25

## 2022-01-06 RX ADMIN — Medication 15 MILLIGRAM(S): at 05:25

## 2022-01-06 RX ADMIN — PANTOPRAZOLE SODIUM 40 MILLIGRAM(S): 20 TABLET, DELAYED RELEASE ORAL at 06:36

## 2022-01-06 NOTE — PROGRESS NOTE ADULT - PROBLEM SELECTOR PLAN 1
-sent from group home for behavioral issue  - incidental findings covid, asystematic, saturating well on RA  - c/w supportive care  -D dimer mildly  elevated 230  - c/w isolation precaution  -trend inflammatory markers

## 2022-01-06 NOTE — CHART NOTE - NSCHARTNOTEFT_GEN_A_CORE
Pt is a  31 year old male with PMHx of Autism, asthma, hyperthyroidism, obesity, GERD, Enuresis, Impulse Control Disorder, Factitious Disorder, Myopia, Moderate Intellectual Disability, and Mitral Valve. Pt was brought to the ED by EMS for chest pain.  Pt  was discharged from [facility]  today, 1/6/22 where he was admitted for COVID pneumonia. Pt screened positive for sbirt . January met with Pt at bedside, alert and oriented . January introduced self and role explained. Pt declined sbirt screening, instead he asked for extra blankets and food. Pt was provided with a tray of food and warm blankets Pt declined when another  attempt was made to engage Pt in sbirt screening .     January also received verbal referral from covering physician for [facility] ( grp home ) return January outreached Pt's respite, [facility] at .  D/c was coordinated wih her. Pt will be picked up from the ED by Emily. Physician made aware.

## 2022-01-06 NOTE — PROGRESS NOTE ADULT - COVID-19 NEGATIVE LAB RESULT
COVID-19 ruled in despite negative lab finding

## 2022-01-06 NOTE — ED PROVIDER NOTE - NSFOLLOWUPINSTRUCTIONS_ED_ALL_ED_FT
Noncardiac Chest Pain    WHAT YOU NEED TO KNOW:    Noncardiac chest pain is pain or discomfort in your chest not caused by a heart problem. Possible causes include acid reflux, nerve or muscle problems, emotions, or chest wall or rib pain.    DISCHARGE INSTRUCTIONS:    Seek care immediately if:   •You have severe chest pain.          Call your doctor if:   •Your chest pain does not get better, even with treatment.      •You have questions or concerns about your condition or care.      Medicines:   •Medicines may be given to treat the cause of your chest pain. You may be given medicines to decrease pain, relieve anxiety, decrease acid reflux, or relax muscles in your esophagus.      •Take your medicine as directed. Contact your healthcare provider if you think your medicine is not helping or if you have side effects. Tell him or her if you are allergic to any medicine. Keep a list of the medicines, vitamins, and herbs you take. Include the amounts, and when and why you take them. Bring the list or the pill bottles to follow-up visits. Carry your medicine list with you in case of an emergency.      Cognitive therapy: Cognitive therapy may be helpful if you have panic attacks, anxiety, or depression. It can help you change how you react to situations that tend to trigger your chest pain.    Healthy living tips: The following are general healthy guidelines. If the cause of your chest pain is known, your healthcare provider will give you specific guidelines to follow.  •Do not smoke. Nicotine and other chemicals in cigarettes and cigars can cause lung and heart damage. Ask your healthcare provider for information if you currently smoke and need help to quit. E-cigarettes or smokeless tobacco still contain nicotine. Talk to your healthcare provider before you use these products.      •Choose a variety of healthy foods as often as possible. Include fresh, frozen, or canned fruits and vegetables. Also include low-fat dairy products, fish, chicken (without skin), and lean meats. Your healthcare provider or a dietitian can help you create meal plans. You may need to avoid certain foods or drinks if your pain is caused by a digestion problem.  Healthy Foods           •Lower your sodium (salt) intake. Limit foods that are high in sodium, such as canned foods, salty snacks, and cold cuts. If you add salt when you cook food, do not add more at the table. Choose low-sodium canned foods as much as possible.             •Ask about activity. Your healthcare provider will tell you which activities to limit or avoid. Ask when you can drive, return to work, and have sex. Ask about the best exercise plan for you.      •Maintain a healthy weight. Ask your healthcare provider what a healthy weight is for you. Ask him or her to help you create a weight loss plan if you are overweight.      •Ask about vaccines you may need. Get the influenza (flu) vaccine every year as soon as recommended, usually in September or October. You may also need a pneumococcal vaccine to prevent pneumonia. The vaccine is usually given every 5 years, starting at age 65. Your healthcare provider can tell you if should get other vaccines, and when to get them.      Follow up with your doctor as directed: Write down your questions so you remember to ask them during your visits.       © Copyright [employer] 2022           back to top                          © Copyright [employer] 2022

## 2022-01-06 NOTE — ED PROVIDER NOTE - PATIENT PORTAL LINK FT
You can access the FollowMyHealth Patient Portal offered by [facility] by registering at the following website: [email or web address]. By joining [facility]’s FollowMyHealth portal, you will also be able to view your health information using other applications (apps) compatible with our system.

## 2022-01-06 NOTE — DISCHARGE NOTE PROVIDER - HOSPITAL COURSE
31yoM from [facility], w/ PMH of autism w/ moderate intellectual disability, asthma, hyperthyroidism, obesity, GERD, enuresis, impulse control disorder, factitious disorder, sent in from the longterm due to staff unable to deal with patient behavior. Patient was recently discharged from [facility], where he was initially admitted for disrupted behavior. He was noted to have COVID at the time. Patient apparently refuses to wear mask at the longterm. Patient complains of intermittent left chest pain and dysuria x2wk. He denies fever, chills, n/v sob, abdominal pain, or bowel movement changes.  Patient is being discharged as he is stable.  31yoM from [facility], w/ PMH of autism w/ moderate intellectual disability, asthma, hyperthyroidism, obesity, GERD, enuresis, impulse control disorder, factitious disorder, sent in from the longterm due to staff unable to deal with patient behavior. Patient was recently discharged from [facility], where he was initially admitted for disrupted behavior. He was noted to have COVID at the time. Patient apparently refuses to wear mask at the longterm. Patient complains of intermittent left chest pain and dysuria x2wk. He denies fever, chills, n/v sob, abdominal pain, or bowel movement changes. For the COVID, he is asymptomatic and saturating well on room air, not in distress. Inflammatory markers were trended and they were stable. Supportive care was provided. He also has dysuria with urinalysis and urine culture both negative for any UTI. He is also having episodes of chest pain but probably non-cardiac in origin. EKG showed NSR and Troponins were negative. Rest of his home medications for his mood disorder, PRANAY, asthma, GERD and Hypothyroidism were continued. Patient is being discharged to a facility as he is stable.

## 2022-01-06 NOTE — ED PROVIDER NOTE - CLINICAL SUMMARY MEDICAL DECISION MAKING FREE TEXT BOX
Pt discharged from hospital today, presents with chest pain.  EKG is NSR.  Normal Sat.  Low suspicion for PE.  Will check labs, EKG, and reassess.

## 2022-01-06 NOTE — PROGRESS NOTE ADULT - PROBLEM SELECTOR PLAN 6
- not in excerebration, on albuterol PRN  - c/w home meds

## 2022-01-06 NOTE — PROGRESS NOTE ADULT - SUBJECTIVE AND OBJECTIVE BOX
PGY-1 Progress Note discussed with attending    PAGER #: [[phone]] TILL 5:00 PM  PLEASE CONTACT ON CALL TEAM:  - On Call Team (Please refer to Leah) FROM 5:00 PM - 8:30PM  - Nightfloat Team FROM 8:30 -7:30 AM    CHIEF COMPLAINT & BRIEF HOSPITAL COURSE:      INTERVAL HPI/OVERNIGHT EVENTS:       REVIEW OF SYSTEMS:  CONSTITUTIONAL: No fever, weight loss, or fatigue  RESPIRATORY: No cough, wheezing, chills or hemoptysis; No shortness of breath  CARDIOVASCULAR: No chest pain, palpitations, dizziness, or leg swelling  GASTROINTESTINAL: No abdominal pain. No nausea, vomiting, or hematemesis; No diarrhea or constipation. No melena or hematochezia.  GENITOURINARY: No dysuria or hematuria, urinary frequency  NEUROLOGICAL: No headaches, memory loss, loss of strength, numbness, or tremors  SKIN: No itching, burning, rashes, or lesions     MEDICATIONS  (STANDING):  busPIRone 15 milliGRAM(s) Oral two times a day  enoxaparin Injectable 40 milliGRAM(s) SubCutaneous daily  ferrous    sulfate 325 milliGRAM(s) Oral daily  levothyroxine 50 MICROGram(s) Oral daily  mirtazapine 15 milliGRAM(s) Oral at bedtime  pantoprazole    Tablet 40 milliGRAM(s) Oral before breakfast  risperiDONE   Tablet 2 milliGRAM(s) Oral two times a day    MEDICATIONS  (PRN):  ALBUTerol    90 MICROgram(s) HFA Inhaler 2 Puff(s) Inhalation every 6 hours PRN Wheezing      Vital Signs Last 24 Hrs  T(C): 36.3 (06 Jan 2022 09:32), Max: 36.9 (05 Jan 2022 16:28)  T(F): 97.4 (06 Jan 2022 09:32), Max: 98.4 (05 Jan 2022 16:28)  HR: 62 (06 Jan 2022 09:32) (60 - 65)  BP: 101/52 (06 Jan 2022 09:32) (100/56 - 116/72)  BP(mean): --  RR: 16 (06 Jan 2022 09:32) (16 - 18)  SpO2: 98% (06 Jan 2022 09:32) (95% - 100%)    PHYSICAL EXAMINATION:  GENERAL: NAD, well built  HEAD:  Atraumatic, Normocephalic  EYES:  conjunctiva and sclera clear  NECK: Supple, No JVD, Normal thyroid  CHEST/LUNG: Clear to auscultation. Clear to percussion bilaterally; No rales, rhonchi, wheezing, or rubs  HEART: Regular rate and rhythm; No murmurs, rubs, or gallops  ABDOMEN: Soft, Nontender, Nondistended; Bowel sounds present, no pain or masses on palpation  NERVOUS SYSTEM:  Alert & Oriented X3  : voiding well  EXTREMITIES:  2+ Peripheral Pulses, No clubbing, cyanosis, or edema  SKIN: warm dry                          12.3   5.56  )-----------( 269      ( 05 Jan 2022 06:42 )             41.6     01-05    143  |  110<H>  |  16  ----------------------------<  87  4.5   |  29  |  0.78    Ca    9.1      05 Jan 2022 06:42                I&O's Summary        Culture - Urine (collected 04 Jan 2022 04:30)  Source: Clean Catch Clean Catch (Midstream)  Final Report (05 Jan 2022 00:07):    <10,000 CFU/mL Normal Urogenital Sarah        CAPILLARY BLOOD GLUCOSE      RADIOLOGY & ADDITIONAL TESTS:                   PGY-1 Progress Note discussed with attending    PAGER #: [[phone]] TILL 5:00 PM  PLEASE CONTACT ON CALL TEAM:  - On Call Team (Please refer to Leah) FROM 5:00 PM - 8:30PM  - Nightfloat Team FROM 8:30 -7:30 AM    CHIEF COMPLAINT & BRIEF HOSPITAL COURSE:    31yoM from [facility], w/ PMH of autism w/ moderate intellectual disability, asthma, hyperthyroidism, obesity, GERD, enuresis, impulse control disorder, factitious disorder, sent in from the residential due to staff unable to deal with patient behavior. Patient was recently discharged from [facility], where he was initially admitted for disrupted behavior. He was noted to have COVID at the time. Patient apparently refuses to wear mask at the residential. Patient complains of intermittent left chest pain and dysuria x2wk. He denies fever, chills, n/v sob, abdominal pain, or bowel movement changes. For the COVID, he is asymptomatic and saturating well on room air, not in distress. Inflammatory markers were trended and they were stable. Supportive care was provided. He also has dysuria with urinalysis and urine culture both negative for any UTI. He is also having episodes of chest pain but probably non-cardiac in origin. EKG showed NSR and Troponins were negative. Rest of his home medications for his mood disorder, PRANAY, asthma, GERD and Hypothyroidism were continued. Patient is being discharged to a facility as he is stable.    INTERVAL HPI/OVERNIGHT EVENTS:     Patient was examined at bedside, AAOx3, stable, NAD. Denies any complaints such as chest pain or dysuria. No other significant events overnight. For discharge to a group home facility.    REVIEW OF SYSTEMS:  CONSTITUTIONAL: No fever, weight loss, or fatigue  RESPIRATORY: No cough, wheezing, chills or hemoptysis; No shortness of breath  CARDIOVASCULAR: No chest pain, palpitations, dizziness, or leg swelling  GASTROINTESTINAL: No abdominal pain. No nausea, vomiting, or hematemesis; No diarrhea or constipation. No melena or hematochezia.  GENITOURINARY: No dysuria or hematuria, urinary frequency  NEUROLOGICAL: No headaches, memory loss, loss of strength, numbness, or tremors  SKIN: No itching, burning, rashes, or lesions     MEDICATIONS  (STANDING):  busPIRone 15 milliGRAM(s) Oral two times a day  enoxaparin Injectable 40 milliGRAM(s) SubCutaneous daily  ferrous    sulfate 325 milliGRAM(s) Oral daily  levothyroxine 50 MICROGram(s) Oral daily  mirtazapine 15 milliGRAM(s) Oral at bedtime  pantoprazole    Tablet 40 milliGRAM(s) Oral before breakfast  risperiDONE   Tablet 2 milliGRAM(s) Oral two times a day    MEDICATIONS  (PRN):  ALBUTerol    90 MICROgram(s) HFA Inhaler 2 Puff(s) Inhalation every 6 hours PRN Wheezing      Vital Signs Last 24 Hrs  T(C): 36.3 (06 Jan 2022 09:32), Max: 36.9 (05 Jan 2022 16:28)  T(F): 97.4 (06 Jan 2022 09:32), Max: 98.4 (05 Jan 2022 16:28)  HR: 62 (06 Jan 2022 09:32) (60 - 65)  BP: 101/52 (06 Jan 2022 09:32) (100/56 - 116/72)  BP(mean): --  RR: 16 (06 Jan 2022 09:32) (16 - 18)  SpO2: 98% (06 Jan 2022 09:32) (95% - 100%)    PHYSICAL EXAMINATION:  GENERAL: NAD  HEAD:  Atraumatic, Normocephalic  EYES:  conjunctiva and sclera clear  NECK: Supple, No JVD, Normal thyroid  CHEST/LUNG: Clear to auscultation. Clear to percussion bilaterally; No rales, rhonchi, wheezing, or rubs  HEART: Regular rate and rhythm; No murmurs, rubs, or gallops  ABDOMEN: Soft, Nontender, Nondistended; Bowel sounds present, no pain or masses on palpation  NERVOUS SYSTEM:  Alert & Oriented X3  : voiding well  EXTREMITIES:  2+ Peripheral Pulses, No clubbing, cyanosis, or edema  SKIN: warm dry                          12.3   5.56  )-----------( 269      ( 05 Jan 2022 06:42 )             41.6     01-05    143  |  110<H>  |  16  ----------------------------<  87  4.5   |  29  |  0.78    Ca    9.1      05 Jan 2022 06:42                I&O's Summary        Culture - Urine (collected 04 Jan 2022 04:30)  Source: Clean Catch Clean Catch (Midstream)  Final Report (05 Jan 2022 00:07):    <10,000 CFU/mL Normal Urogenital Sarah        CAPILLARY BLOOD GLUCOSE      RADIOLOGY & ADDITIONAL TESTS:

## 2022-01-06 NOTE — DISCHARGE NOTE NURSING/CASE MANAGEMENT/SOCIAL WORK - PATIENT PORTAL LINK FT
You can access the FollowMyHealth Patient Portal offered by [facility] by registering at the following website: [email or web address]. By joining [facility]’s FollowMyHealth portal, you will also be able to view your health information using other applications (apps) compatible with our system.

## 2022-01-06 NOTE — PROGRESS NOTE ADULT - PROBLEM SELECTOR PLAN 4
- h/o autism w/ intellectual disability  - supportive care

## 2022-01-06 NOTE — DISCHARGE NOTE NURSING/CASE MANAGEMENT/SOCIAL WORK - NSDCVIVACCINE_GEN_ALL_CORE_FT
Tdap; 20-Dec-2018 12:21; Lo Anaya (RN); [employer]; [id number] (Exp. Date: 02-Nov-2020); IntraMuscular; Deltoid Left.; 0.5 milliLiter(s); VIS (VIS Published: 09-May-2013, VIS Presented: 20-Dec-2018);   Tdap; 26-Mar-2019 18:59; Shavon Barrios (RN); [employer]; [id number] (Exp. Date: 26-Feb-2021); IntraMuscular; Deltoid Left.; 0.5 milliLiter(s); VIS (VIS Published: 09-May-2013, VIS Presented: 26-Mar-2019);   Tdap; 01-Dec-2021 09:35; Zamzam Coulter (RN); [employer]; [id number] (Exp. Date: 09-Sep-2023); IntraMuscular; Deltoid Left.; 0.5 milliLiter(s); VIS (VIS Published: 09-May-2013, VIS Presented: 01-Dec-2021);

## 2022-01-06 NOTE — PROGRESS NOTE ADULT - ASSESSMENT
Patient is a 31yoM from [facility], w/ PMH of autism w/ moderate intellectual disability, asthma, hyperthyroidism, obesity, GERD, enuresis, impulse control disorder, factitious disorder, [facility] staff unable to deal with patient behavior. Admitted for COVID. asymptomatic. saturating well on RA.  
Patient is a 31yoM from [facility], w/ PMH of autism w/ moderate intellectual disability, asthma, hyperthyroidism, obesity, GERD, enuresis, impulse control disorder, factitious disorder, [facility] unable to deal with patient behavior. Admitted for COVID. asymptomatic. saturating well on RA.  
Patient is a 31yoM from [facility], w/ PMH of autism w/ moderate intellectual disability, asthma, hyperthyroidism, obesity, GERD, enuresis, impulse control disorder, factitious disorder, [facility] due to staff unable to deal with patient behavior. Admitted for COVID. asymptomatic. saturating well on RA.

## 2022-01-06 NOTE — DISCHARGE NOTE NURSING/CASE MANAGEMENT/SOCIAL WORK - NSDCPEFALRISK_GEN_ALL_CORE
For information on Fall & Injury Prevention, visit: [email or web address] OR  [email or web address] OR  https://www.cdc.gov/steadi/patient.html

## 2022-01-06 NOTE — DISCHARGE NOTE PROVIDER - NSDCMRMEDTOKEN_GEN_ALL_CORE_FT
albuterol 90 mcg/inh inhalation aerosol: 2 puff(s) inhaled every 6 hours x 14 days, As Needed  - wheezing  Continue to take as prescribed until told otherwise by your provider  benztropine 1 mg oral tablet: 1 tab(s) orally 2 times a day  busPIRone 15 mg oral tablet: 1 tab(s) orally 2 times a day x 14 days  Continue to take as prescribed until told otherwise by your provider  Depakote: orally 2 times a day  finasteride 5 mg oral tablet: 1 tab(s) orally once a day  guanFACINE 1 mg oral tablet: 1 tab(s) orally 2 times a day  levothyroxine 50 mcg (0.05 mg) oral tablet: 1 tab(s) orally once a day x 14 days  Continue to take as prescribed until told otherwise by your provider  LORazepam 1 mg oral tablet: 1 tab(s) orally 3 times a day  mirtazapine 15 mg oral tablet: 1 tab(s) orally once a day (at bedtime) x 14 days  Continue to take as prescribed until told otherwise by your provider  pantoprazole 20 mg oral delayed release tablet: 1 tab(s) orally once a day  pravastatin 20 mg oral tablet: 1 tab(s) orally once a day x 14 days  Continue to take as prescribed until told otherwise by your provider  risperiDONE 2 mg oral tablet: 1 tab(s) orally 2 times a day  tamsulosin 0.4 mg oral capsule: 1 cap(s) orally once a day   albuterol 90 mcg/inh inhalation aerosol: 2 puff(s) inhaled every 6 hours x 14 days, As Needed  - wheezing  Continue to take as prescribed until told otherwise by your provider  benztropine 1 mg oral tablet: 1 tab(s) orally 2 times a day  busPIRone 15 mg oral tablet: 1 tab(s) orally 2 times a day x 14 days  Continue to take as prescribed until told otherwise by your provider  Depakote: orally 2 times a day  enoxaparin: 40 milligram(s) subcutaneous once a day  ferrous sulfate 325 mg (65 mg elemental iron) oral tablet: 1 tab(s) orally once a day  finasteride 5 mg oral tablet: 1 tab(s) orally once a day  guanFACINE 1 mg oral tablet: 1 tab(s) orally 2 times a day  levothyroxine 50 mcg (0.05 mg) oral tablet: 1 tab(s) orally once a day x 14 days  Continue to take as prescribed until told otherwise by your provider  LORazepam 1 mg oral tablet: 1 tab(s) orally 3 times a day  mirtazapine 15 mg oral tablet: 1 tab(s) orally once a day (at bedtime) x 14 days  Continue to take as prescribed until told otherwise by your provider  pantoprazole 20 mg oral delayed release tablet: 1 tab(s) orally once a day  pravastatin 20 mg oral tablet: 1 tab(s) orally once a day x 14 days  Continue to take as prescribed until told otherwise by your provider  risperiDONE 2 mg oral tablet: 1 tab(s) orally 2 times a day  tamsulosin 0.4 mg oral capsule: 1 cap(s) orally once a day

## 2022-01-06 NOTE — DISCHARGE NOTE PROVIDER - NSDCCPCAREPLAN_GEN_ALL_CORE_FT
PRINCIPAL DISCHARGE DIAGNOSIS  Diagnosis: 2019 novel coronavirus disease (COVID-19)  Assessment and Plan of Treatment: You tested positive for COVID-19 but stable, not in distress and saturating well on room air. You were placed on isolation. Inflammatory markers were trended and they were stable. Supportive care was provided. You were discharged stable, improved to a group home facility. Follow-up with your primary care doctor.      SECONDARY DISCHARGE DIAGNOSES  Diagnosis: Mood disorder  Assessment and Plan of Treatment: You have history of mood disorder and were not following COVId precautions in the group home. You were admitted because of that. We continued your buspirone, mirtazapine and risperidone. You were calm and cooperative throughout your admission. You were stable, improved and was subsequently discharged to [facility] home facility. Follow-up with your primary care doctor.    Diagnosis: Intellectual disability  Assessment and Plan of Treatment: You have history of moderate intellectual disabilty. You were admitted due to refusal to follow COVID precautions in your group home. During your admission, you were calm, cooperative. You were stable, improved and was subsequently discharged to group home facility.    Diagnosis: Dysuria  Assessment and Plan of Treatment: You have been having episodes of dysuria. Urinalysis and Urine culture were both negative. You were stable, improved and was subsequently discharged to group home facility.    Diagnosis: Chest pain  Assessment and Plan of Treatment: You had been complaining of episodes of left-sided chest pain radiating to your left shoulder. EKG was done and you were normal without any signs of ischemia. Troponins were done and they were negative. You were stable, improved and was subsequently discharged to group home facility.    Diagnosis: Asthma  Assessment and Plan of Treatment: You have asthma on albuterol. We continued your home medication. You had no episodes of exacerbation throughout your admission.    Diagnosis: GERD (gastroesophageal reflux disease)  Assessment and Plan of Treatment: You have GERD and was placed on pantoprazole. You were complaining of chest pain which is probably due to GERD. EKG and Troponins were normal which ruled out cardiac origin.    Diagnosis: Hypothyroidism  Assessment and Plan of Treatment: You have hypothyroidism on levothyroxine. We resumed your home medication. Thyroid Stimulating Hormone was elevated but Free Thyroxine was normal. Resume your home medications.

## 2022-01-06 NOTE — DISCHARGE NOTE PROVIDER - CARE PROVIDER_API CALL
Gus Birmingham (MD)  Family Medicine  [address] [place] [address]  Phone: [phone]  Fax: [phone]  Follow Up Time:

## 2022-01-06 NOTE — SBIRT NOTE ADULT - NSSBIRTUNABLESCROTHER_GEN_A_CORE
Pt was not able to engage in screening, Pt was not responding appropriately and he kept asking for food/ warm blankets.  Food and blankets provided.

## 2022-01-18 ENCOUNTER — EMERGENCY (EMERGENCY)
Facility: HOSPITAL | Age: 32
LOS: 1 days | Discharge: HOME | End: 2022-01-18
Attending: STUDENT IN AN ORGANIZED HEALTH CARE EDUCATION/TRAINING PROGRAM
Payer: MEDICAID

## 2022-01-18 VITALS
OXYGEN SATURATION: 99 % | TEMPERATURE: 97 F | HEART RATE: 82 BPM | RESPIRATION RATE: 18 BRPM | HEIGHT: 71 IN | SYSTOLIC BLOOD PRESSURE: 123 MMHG | DIASTOLIC BLOOD PRESSURE: 68 MMHG

## 2022-01-18 PROCEDURE — 99282 EMERGENCY DEPT VISIT SF MDM: CPT

## 2022-01-18 NOTE — ED PROVIDER NOTE - CLINICAL SUMMARY MEDICAL DECISION MAKING FREE TEXT BOX
pt p/w need for fixing of thumb spica splint  splint in good condition, placed yesterday, ace bandage replaced

## 2022-01-18 NOTE — ED ADULT NURSE NOTE - NSSUHOSCREENINGYN_ED_ALL_ED
"Chief Complaint  Gynecologic Exam    Subjective        Shahana Anderson presents to Rivendell Behavioral Health Services No - the patient is unable to be screened due to medical condition

## 2022-01-18 NOTE — ED ADULT NURSE NOTE - CHIEF COMPLAINT QUOTE
sent from [facility] home c/o pain to Rt hand / thumb " punched the wall many times last night" escort reports pt was seen at [facility] last night .

## 2022-01-18 NOTE — ED PROVIDER NOTE - OBJECTIVE STATEMENT
30yo M pmhx autism, gerd, obesity, factitious disorder p/w request to "fix cast." Patient appears to have a thumb spica splint on R arm for "dislocated thumb" and states that he either wants it taken off or fixed, ace bandage is half unwrapped off. patient states that he received the splint yesterday at another hospital and is supposed to f/up with ortho/hand. Patient denies any other complaints at this time.

## 2022-01-18 NOTE — ED ADULT TRIAGE NOTE - CHIEF COMPLAINT QUOTE
sent from [facility] home c/o pain to Rt hand / thumb " punched the wall many times last night" escort reports pt was seen at [facility] last night .

## 2022-01-18 NOTE — ED PROVIDER NOTE - PHYSICAL EXAMINATION
Sohrawardy:   VS reviewed  NAD  aax3  nc/at  rrr  normal resp effort  strength 5/5, sensation intact in R hand, no neuro deficits

## 2022-01-18 NOTE — ED PROVIDER NOTE - NS ED ROS FT
(-) fevers, chills  (-) dizziness, lightheadedness, vision changes  (-) neck pain, stiffness  (-) cp, palpitations  (-) sob, cough, hemoptysis  (-) abd pain, n/v/d/c   (-) urinary sxs, back pain  (-) weakness, paresthesias

## 2022-01-18 NOTE — ED PROVIDER NOTE - PATIENT PORTAL LINK FT
You can access the FollowMyHealth Patient Portal offered by [facility] by registering at the following website: [email or web address]. By joining [employer]’s FollowMyHealth portal, you will also be able to view your health information using other applications (apps) compatible with our system.

## 2022-01-31 ENCOUNTER — EMERGENCY (EMERGENCY)
Facility: HOSPITAL | Age: 32
LOS: 1 days | Discharge: HOME | End: 2022-01-31
Attending: STUDENT IN AN ORGANIZED HEALTH CARE EDUCATION/TRAINING PROGRAM
Payer: MEDICAID

## 2022-01-31 VITALS
HEART RATE: 101 BPM | WEIGHT: 255.07 LBS | TEMPERATURE: 99 F | DIASTOLIC BLOOD PRESSURE: 83 MMHG | SYSTOLIC BLOOD PRESSURE: 118 MMHG | HEIGHT: 71 IN | OXYGEN SATURATION: 98 % | RESPIRATION RATE: 18 BRPM

## 2022-01-31 LAB
ALBUMIN SERPL ELPH-MCNC: 3.3 G/DL — LOW (ref 3.5–5)
ALP SERPL-CCNC: 86 U/L — SIGNIFICANT CHANGE UP (ref 40–120)
ALT FLD-CCNC: 39 U/L DA — SIGNIFICANT CHANGE UP (ref 10–60)
ANION GAP SERPL CALC-SCNC: 5 MMOL/L — SIGNIFICANT CHANGE UP (ref 5–17)
AST SERPL-CCNC: 27 U/L — SIGNIFICANT CHANGE UP (ref 10–40)
BASOPHILS # BLD AUTO: 0.03 K/UL — SIGNIFICANT CHANGE UP (ref 0–0.2)
BASOPHILS NFR BLD AUTO: 0.4 % — SIGNIFICANT CHANGE UP (ref 0–2)
BILIRUB SERPL-MCNC: 0.3 MG/DL — SIGNIFICANT CHANGE UP (ref 0.2–1.2)
BUN SERPL-MCNC: 16 MG/DL — SIGNIFICANT CHANGE UP (ref 7–18)
CALCIUM SERPL-MCNC: 9 MG/DL — SIGNIFICANT CHANGE UP (ref 8.4–10.5)
CHLORIDE SERPL-SCNC: 104 MMOL/L — SIGNIFICANT CHANGE UP (ref 96–108)
CO2 SERPL-SCNC: 29 MMOL/L — SIGNIFICANT CHANGE UP (ref 22–31)
CREAT SERPL-MCNC: 0.76 MG/DL — SIGNIFICANT CHANGE UP (ref 0.5–1.3)
EOSINOPHIL # BLD AUTO: 0.34 K/UL — SIGNIFICANT CHANGE UP (ref 0–0.5)
EOSINOPHIL NFR BLD AUTO: 4.4 % — SIGNIFICANT CHANGE UP (ref 0–6)
ETHANOL SERPL-MCNC: <3 MG/DL — SIGNIFICANT CHANGE UP (ref 0–10)
GLUCOSE SERPL-MCNC: 130 MG/DL — HIGH (ref 70–99)
HCT VFR BLD CALC: 41 % — SIGNIFICANT CHANGE UP (ref 39–50)
HGB BLD-MCNC: 12.7 G/DL — LOW (ref 13–17)
IMM GRANULOCYTES NFR BLD AUTO: 1 % — SIGNIFICANT CHANGE UP (ref 0–1.5)
LYMPHOCYTES # BLD AUTO: 2.09 K/UL — SIGNIFICANT CHANGE UP (ref 1–3.3)
LYMPHOCYTES # BLD AUTO: 27.2 % — SIGNIFICANT CHANGE UP (ref 13–44)
MCHC RBC-ENTMCNC: 22.8 PG — LOW (ref 27–34)
MCHC RBC-ENTMCNC: 31 GM/DL — LOW (ref 32–36)
MCV RBC AUTO: 73.6 FL — LOW (ref 80–100)
MONOCYTES # BLD AUTO: 0.66 K/UL — SIGNIFICANT CHANGE UP (ref 0–0.9)
MONOCYTES NFR BLD AUTO: 8.6 % — SIGNIFICANT CHANGE UP (ref 2–14)
NEUTROPHILS # BLD AUTO: 4.48 K/UL — SIGNIFICANT CHANGE UP (ref 1.8–7.4)
NEUTROPHILS NFR BLD AUTO: 58.4 % — SIGNIFICANT CHANGE UP (ref 43–77)
NRBC # BLD: 0 /100 WBCS — SIGNIFICANT CHANGE UP (ref 0–0)
PLATELET # BLD AUTO: 239 K/UL — SIGNIFICANT CHANGE UP (ref 150–400)
POTASSIUM SERPL-MCNC: 4.3 MMOL/L — SIGNIFICANT CHANGE UP (ref 3.5–5.3)
POTASSIUM SERPL-SCNC: 4.3 MMOL/L — SIGNIFICANT CHANGE UP (ref 3.5–5.3)
PROT SERPL-MCNC: 6.7 G/DL — SIGNIFICANT CHANGE UP (ref 6–8.3)
RBC # BLD: 5.57 M/UL — SIGNIFICANT CHANGE UP (ref 4.2–5.8)
RBC # FLD: 18.4 % — HIGH (ref 10.3–14.5)
SODIUM SERPL-SCNC: 138 MMOL/L — SIGNIFICANT CHANGE UP (ref 135–145)
WBC # BLD: 7.68 K/UL — SIGNIFICANT CHANGE UP (ref 3.8–10.5)
WBC # FLD AUTO: 7.68 K/UL — SIGNIFICANT CHANGE UP (ref 3.8–10.5)

## 2022-01-31 PROCEDURE — 80053 COMPREHEN METABOLIC PANEL: CPT

## 2022-01-31 PROCEDURE — 72100 X-RAY EXAM L-S SPINE 2/3 VWS: CPT | Mod: 26

## 2022-01-31 PROCEDURE — 85025 COMPLETE CBC W/AUTO DIFF WBC: CPT

## 2022-01-31 PROCEDURE — 36415 COLL VENOUS BLD VENIPUNCTURE: CPT

## 2022-01-31 PROCEDURE — 73120 X-RAY EXAM OF HAND: CPT

## 2022-01-31 PROCEDURE — 72100 X-RAY EXAM L-S SPINE 2/3 VWS: CPT

## 2022-01-31 PROCEDURE — 73120 X-RAY EXAM OF HAND: CPT | Mod: 26,RT

## 2022-01-31 PROCEDURE — 99284 EMERGENCY DEPT VISIT MOD MDM: CPT | Mod: 25

## 2022-01-31 PROCEDURE — 80307 DRUG TEST PRSMV CHEM ANLYZR: CPT

## 2022-01-31 PROCEDURE — 99284 EMERGENCY DEPT VISIT MOD MDM: CPT

## 2022-01-31 RX ORDER — ACETAMINOPHEN 500 MG
2 TABLET ORAL
Qty: 40 | Refills: 0
Start: 2022-01-31

## 2022-01-31 NOTE — ED PROVIDER NOTE - PROGRESS NOTE DETAILS
Patient xray reviewed. no fracture noted. ambulatory. staff from patient facility called to  patient. return precaution instructeed

## 2022-01-31 NOTE — ED PROVIDER NOTE - OBJECTIVE STATEMENT
31 y.o presenting s/p mechanical fall 1 week ago, endorses back pain and right hand pain,. denies n, v, cp, sob, abd pain

## 2022-01-31 NOTE — ED PROVIDER NOTE - CLINICAL SUMMARY MEDICAL DECISION MAKING FREE TEXT BOX
Patient presenting with back pain, no midline deformity, occurred 1 week ago. no signs of caude. will obtain xray, r/o bony injury. ed obs and reassess

## 2022-01-31 NOTE — ED PROVIDER NOTE - PATIENT PORTAL LINK FT
You can access the FollowMyHealth Patient Portal offered by [facility] by registering at the following website: [email or web address]. By joining [employer]’s FollowMyHealth portal, you will also be able to view your health information using other applications (apps) compatible with our system.

## 2022-01-31 NOTE — ED ADULT NURSE NOTE - NSIMPLEMENTINTERV_GEN_ALL_ED
Implemented All Universal Safety Interventions:  [place] to call system. Call bell, personal items and telephone within reach. Instruct patient to call for assistance. Room bathroom lighting operational. Non-slip footwear when patient is off stretcher. Physically safe environment: no spills, clutter or unnecessary equipment. Stretcher in lowest position, wheels locked, appropriate side rails in place.

## 2022-02-09 NOTE — ED ADULT NURSE NOTE - PLAN OF CARE DISCUSSED WITH:
[facility] Pharmacokinetic Monitoring Service - Vancomycin     Sean Friedman is a 42 y.o. female starting on vancomycin therapy for ssti. Pharmacy consulted by Cirilo Davis for monitoring and adjustment. Target Concentration: Goal AUC/ALIN 400-600 mg*hr/L    Additional Antimicrobials: Cleocin/Zosyn    Pertinent Laboratory Values: Wt Readings from Last 1 Encounters:   02/08/22 275 lb (124.7 kg)     Temp Readings from Last 1 Encounters:   02/08/22 98.5 °F (36.9 °C)     Estimated Creatinine Clearance: 29 mL/min (A) (based on SCr of 3.28 mg/dL (H)). Recent Labs     02/08/22  2228   CREATININE 3.28*   WBC 19.9*     Procalcitonin:     Pertinent Cultures:  Culture Date Source Results        MRSA Nasal Swab:     Plan: 1. Start vancomycin 2500 mg IV x1 dose now. 2. Renal labs as indicated  2.  Pharmacy will continue to monitor patient and adjust therapy as indicated    Thank you for the consult,  Jose Henderson, [facility]  2/8/2022 11:01 PM Patient

## 2022-03-05 ENCOUNTER — EMERGENCY (EMERGENCY)
Facility: HOSPITAL | Age: 32
LOS: 1 days | Discharge: HOME | End: 2022-03-05
Attending: EMERGENCY MEDICINE
Payer: MEDICAID

## 2022-03-05 VITALS
RESPIRATION RATE: 20 BRPM | HEART RATE: 94 BPM | HEIGHT: 71 IN | SYSTOLIC BLOOD PRESSURE: 130 MMHG | WEIGHT: 265 LBS | TEMPERATURE: 98 F | DIASTOLIC BLOOD PRESSURE: 75 MMHG | OXYGEN SATURATION: 97 %

## 2022-03-05 LAB
ALBUMIN SERPL ELPH-MCNC: 3.1 G/DL — LOW (ref 3.5–5)
ALP SERPL-CCNC: 85 U/L — SIGNIFICANT CHANGE UP (ref 40–120)
ALT FLD-CCNC: 23 U/L DA — SIGNIFICANT CHANGE UP (ref 10–60)
ANION GAP SERPL CALC-SCNC: 5 MMOL/L — SIGNIFICANT CHANGE UP (ref 5–17)
AST SERPL-CCNC: 11 U/L — SIGNIFICANT CHANGE UP (ref 10–40)
BASOPHILS # BLD AUTO: 0.04 K/UL — SIGNIFICANT CHANGE UP (ref 0–0.2)
BASOPHILS NFR BLD AUTO: 0.6 % — SIGNIFICANT CHANGE UP (ref 0–2)
BILIRUB SERPL-MCNC: 0.2 MG/DL — SIGNIFICANT CHANGE UP (ref 0.2–1.2)
BUN SERPL-MCNC: 16 MG/DL — SIGNIFICANT CHANGE UP (ref 7–18)
CALCIUM SERPL-MCNC: 8.6 MG/DL — SIGNIFICANT CHANGE UP (ref 8.4–10.5)
CHLORIDE SERPL-SCNC: 107 MMOL/L — SIGNIFICANT CHANGE UP (ref 96–108)
CO2 SERPL-SCNC: 27 MMOL/L — SIGNIFICANT CHANGE UP (ref 22–31)
CREAT SERPL-MCNC: 0.8 MG/DL — SIGNIFICANT CHANGE UP (ref 0.5–1.3)
D DIMER BLD IA.RAPID-MCNC: <150 NG/ML DDU — SIGNIFICANT CHANGE UP
EGFR: 121 ML/MIN/1.73M2 — SIGNIFICANT CHANGE UP
EOSINOPHIL # BLD AUTO: 0.32 K/UL — SIGNIFICANT CHANGE UP (ref 0–0.5)
EOSINOPHIL NFR BLD AUTO: 4.5 % — SIGNIFICANT CHANGE UP (ref 0–6)
GLUCOSE SERPL-MCNC: 111 MG/DL — HIGH (ref 70–99)
HCT VFR BLD CALC: 40.6 % — SIGNIFICANT CHANGE UP (ref 39–50)
HGB BLD-MCNC: 12.1 G/DL — LOW (ref 13–17)
HIV 1 & 2 AB SERPL IA.RAPID: SIGNIFICANT CHANGE UP
IMM GRANULOCYTES NFR BLD AUTO: 0.8 % — SIGNIFICANT CHANGE UP (ref 0–1.5)
LIDOCAIN IGE QN: 141 U/L — SIGNIFICANT CHANGE UP (ref 73–393)
LYMPHOCYTES # BLD AUTO: 2.29 K/UL — SIGNIFICANT CHANGE UP (ref 1–3.3)
LYMPHOCYTES # BLD AUTO: 31.8 % — SIGNIFICANT CHANGE UP (ref 13–44)
MAGNESIUM SERPL-MCNC: 1.9 MG/DL — SIGNIFICANT CHANGE UP (ref 1.6–2.6)
MCHC RBC-ENTMCNC: 22.8 PG — LOW (ref 27–34)
MCHC RBC-ENTMCNC: 29.8 GM/DL — LOW (ref 32–36)
MCV RBC AUTO: 76.5 FL — LOW (ref 80–100)
MONOCYTES # BLD AUTO: 0.75 K/UL — SIGNIFICANT CHANGE UP (ref 0–0.9)
MONOCYTES NFR BLD AUTO: 10.4 % — SIGNIFICANT CHANGE UP (ref 2–14)
NEUTROPHILS # BLD AUTO: 3.73 K/UL — SIGNIFICANT CHANGE UP (ref 1.8–7.4)
NEUTROPHILS NFR BLD AUTO: 51.9 % — SIGNIFICANT CHANGE UP (ref 43–77)
NRBC # BLD: 0 /100 WBCS — SIGNIFICANT CHANGE UP (ref 0–0)
NT-PROBNP SERPL-SCNC: 47 PG/ML — SIGNIFICANT CHANGE UP (ref 0–125)
PLATELET # BLD AUTO: 240 K/UL — SIGNIFICANT CHANGE UP (ref 150–400)
POTASSIUM SERPL-MCNC: 4.2 MMOL/L — SIGNIFICANT CHANGE UP (ref 3.5–5.3)
POTASSIUM SERPL-SCNC: 4.2 MMOL/L — SIGNIFICANT CHANGE UP (ref 3.5–5.3)
PROT SERPL-MCNC: 6.6 G/DL — SIGNIFICANT CHANGE UP (ref 6–8.3)
RBC # BLD: 5.31 M/UL — SIGNIFICANT CHANGE UP (ref 4.2–5.8)
RBC # FLD: 16.7 % — HIGH (ref 10.3–14.5)
SARS-COV-2 RNA SPEC QL NAA+PROBE: SIGNIFICANT CHANGE UP
SODIUM SERPL-SCNC: 139 MMOL/L — SIGNIFICANT CHANGE UP (ref 135–145)
TROPONIN I, HIGH SENSITIVITY RESULT: 5.2 NG/L — SIGNIFICANT CHANGE UP
WBC # BLD: 7.19 K/UL — SIGNIFICANT CHANGE UP (ref 3.8–10.5)
WBC # FLD AUTO: 7.19 K/UL — SIGNIFICANT CHANGE UP (ref 3.8–10.5)

## 2022-03-05 PROCEDURE — 83880 ASSAY OF NATRIURETIC PEPTIDE: CPT

## 2022-03-05 PROCEDURE — 96374 THER/PROPH/DIAG INJ IV PUSH: CPT

## 2022-03-05 PROCEDURE — 87635 SARS-COV-2 COVID-19 AMP PRB: CPT

## 2022-03-05 PROCEDURE — 85379 FIBRIN DEGRADATION QUANT: CPT

## 2022-03-05 PROCEDURE — 83735 ASSAY OF MAGNESIUM: CPT

## 2022-03-05 PROCEDURE — 71045 X-RAY EXAM CHEST 1 VIEW: CPT | Mod: 26

## 2022-03-05 PROCEDURE — 99285 EMERGENCY DEPT VISIT HI MDM: CPT

## 2022-03-05 PROCEDURE — 80053 COMPREHEN METABOLIC PANEL: CPT

## 2022-03-05 PROCEDURE — 36415 COLL VENOUS BLD VENIPUNCTURE: CPT

## 2022-03-05 PROCEDURE — 71045 X-RAY EXAM CHEST 1 VIEW: CPT

## 2022-03-05 PROCEDURE — 99284 EMERGENCY DEPT VISIT MOD MDM: CPT | Mod: 25

## 2022-03-05 PROCEDURE — 83690 ASSAY OF LIPASE: CPT

## 2022-03-05 PROCEDURE — 85025 COMPLETE CBC W/AUTO DIFF WBC: CPT

## 2022-03-05 PROCEDURE — 86703 HIV-1/HIV-2 1 RESULT ANTBDY: CPT

## 2022-03-05 PROCEDURE — 93005 ELECTROCARDIOGRAM TRACING: CPT

## 2022-03-05 PROCEDURE — 84484 ASSAY OF TROPONIN QUANT: CPT

## 2022-03-05 RX ORDER — KETOROLAC TROMETHAMINE 30 MG/ML
15 SYRINGE (ML) INJECTION ONCE
Refills: 0 | Status: DISCONTINUED | OUTPATIENT
Start: 2022-03-05 | End: 2022-03-05

## 2022-03-05 RX ADMIN — Medication 15 MILLIGRAM(S): at 19:47

## 2022-03-05 NOTE — ED PROVIDER NOTE - PATIENT PORTAL LINK FT
no
You can access the FollowMyHealth Patient Portal offered by [facility] by registering at the following website: [email or web address]. By joining [employer]’s FollowMyHealth portal, you will also be able to view your health information using other applications (apps) compatible with our system.

## 2022-03-05 NOTE — ED ADULT NURSE NOTE - OBJECTIVE STATEMENT
Pt arrived to ED from [facility] [place] , c/o Lt sided chest pain x 2-3 weeks  As per mother , he was seen at [place] a few days ago, and sent back home  Pain is non radiating , denies nausea or vomiting, states has shortness of breath at times

## 2022-03-05 NOTE — ED PROVIDER NOTE - OBJECTIVE STATEMENT
32 y/o male, has history of HLD, hypothyroidism, autism, asthma, he lives in a group home, coming in w/ left-sided chest pain. States the pain is pretty severe, sharp, non-radiating, intermittent for the past 2 weeks, associated w/ dizziness, palpitations. No shortness of breath, no URI symptoms, no GI,  symptoms. Had similar symptoms in December. He was admitted to [facility], but no provocative testing was done. Spoke w/ mother on the phone who was not able to provide much more information. Patient denies any other symptoms. NKDA.

## 2022-03-05 NOTE — ED PROVIDER NOTE - CCCP TRG CHIEF CMPLNT
Remote interrogation of implanted cardiac event monitor shows normal function. ILR implanted for stroke. No AF recorded to date.  6/21/20-symptomatic pause recorded showing high grade avb. Ov 7/7/20-addressed pause. No symptom episodes recorded. 1 tachy recording shows pAT x 5 sec. Follow up 1 month via carelink.
chest pain

## 2022-03-05 NOTE — ED PROVIDER NOTE - NSICDXPASTMEDICALHX_GEN_ALL_CORE_FT
PAST MEDICAL HISTORY:  Asthma     Autism     Dyslipidemia     Enuresis     Factitious disorder     GERD (gastroesophageal reflux disease)     History of BPH     Hypothyroid     Hypothyroidism     Intellectual disability     Mood disorder     Myopia of both eyes     Urinary retention       HLD (hyperlipidemia)

## 2022-03-05 NOTE — CHART NOTE - NSCHARTNOTEFT_GEN_A_CORE
[facility]  Cardiac Electrophysiology  Progress Note    Admission Date: 4/8/2019  Code Status: Full Code   Attending Physician: Yovany Macdonald MD   Expected Discharge Date: 4/17/2019  Principal Problem:NSTEMI (non-ST elevated myocardial infarction)    Subjective:     Interval History: Yesterday, pt got revascularized. He had ostial to prox LAD lesion with 75% stenosis that was s/p PCI with placement of SHIRA x1. Overnight, hypotensive so antihypertensive meds adjusted. Pt still 1:1 on IABP. No VT/VF seen on telemetry. Pt denies acute complaints.     Review of Systems   Cardiovascular: Negative for chest pain, irregular heartbeat, leg swelling, near-syncope, orthopnea, palpitations and syncope.   Respiratory: Negative for cough and shortness of breath.    Gastrointestinal: Negative for abdominal pain, nausea and vomiting.   Neurological: Negative for dizziness, focal weakness, headaches, light-headedness and weakness.     Objective:     Vital Signs (Most Recent):  Temp: 98.2 °F (36.8 °C) (04/13/19 0700)  Pulse: 73 (04/13/19 0800)  Resp: 20 (04/13/19 0800)  BP: (!) 99/59 (04/13/19 0800)  SpO2: (!) 93 % (04/13/19 0800) Vital Signs (24h Range):  Temp:  [98.2 °F (36.8 °C)-99 °F (37.2 °C)] 98.2 °F (36.8 °C)  Pulse:  [65-80] 73  Resp:  [16-36] 20  SpO2:  [93 %-99 %] 93 %  BP: ()/(58-88) 99/59     Weight: 105.6 kg (232 lb 12.9 oz)  Body mass index is 31.57 kg/m².     SpO2: (!) 93 %  O2 Device (Oxygen Therapy): nasal cannula    Physical Exam   Constitutional: He is oriented to person, place, and time. He appears well-developed and well-nourished. No distress.   Head: Normocephalic and atraumatic.   Eyes: EOM are normal.   Neck: TLC in RIJ    Cardiovascular: Normal rate, regular rhythm and normal heart sounds.   IABP via left CFA   Pulmonary/Chest: Effort normal.   Abdominal: Soft. Bowel sounds are normal.   Neurological: He is alert and oriented to person, place, and time.   Skin: Skin is warm  Consult received for [facility] Return     Pt is a  31 year old male with PMHx of Autism, asthma, hyperthyroidism, obesity, GERD, Enuresis, Impulse Control Disorder, Factitious Disorder, Myopia, Moderate Intellectual Disability, and Mitral Valve. Pt was brought to the ED by EMS from [facility] ( [facility] home ) with the complaint of left-sided chest pain. January met with Pt at bedside re consult. Pt appeared , alert and oriented x3. January introduced self and role explained. D/c discussed with Pt . He reports reaching out to  Emily, his respite from the [facility] home to pick him up. January outreached Pt's respite, Emily at  who confirms Pt reaching out to her re d/c and  from the ED.. Pt was socially cleared.  Physician made aware. and dry. No erythema.   Psychiatric: He has a normal mood and affect. Judgment and thought content normal.       Significant Labs: All pertinent lab results from the last 24 hours have been reviewed.    Significant Imaging: Echocardiogram:   Transthoracic echo (TTE) complete (Cupid Only):   Results for orders placed or performed during the hospital encounter of 04/08/19   Transthoracic echo (TTE) 2D with Color Flow   Result Value Ref Range    BSA 2.34 m2    TDI SEPTAL 0.04     LV LATERAL E/E' RATIO 13.38     LV SEPTAL E/E' RATIO 26.75     LA WIDTH 4.34 cm    AORTIC VALVE CUSP SEPERATION 2.58 cm    TDI LATERAL 0.08     PV PEAK VELOCITY 1.09 cm/s    LVIDD 4.70 3.5 - 6.0 cm    IVS 1.51 (A) 0.6 - 1.1 cm    PW 1.45 (A) 0.6 - 1.1 cm    Ao root annulus 3.58 cm    LVIDS 3.98 2.1 - 4.0 cm    FS 15 28 - 44 %    LA volume 59.68 cm3    Sinus 3.23 cm    STJ 2.78 cm    Ascending aorta 3.04 cm    LV mass 288.17 g    LA size 2.94 cm    RVDD 4.27 cm    TAPSE 2.00 cm    RV S' 16.79 m/s    Left Ventricle Relative Wall Thickness 0.62 cm    AV mean gradient 4.42 mmHg    AV valve area 2.99 cm2    AV Velocity Ratio 0.98     AV index (prosthetic) 0.92     E/A ratio 2.02     Mean e' 0.06     E wave decelartion time 151.03 msec    IVRT 0.10 msec    Pulm vein S/D ratio 0.69     LVOT diameter 2.03 cm    LVOT area 3.23 cm2    LVOT peak shree [id number] m/s    LVOT peak VTI 22.12 cm    Ao peak shree 1.23 m/s    Ao VTI 23.93 cm    LVOT stroke volume 71.56 cm3    AV peak gradient 6.05 mmHg    E/E' ratio 17.83     MV Peak E Shree 1.07 m/s    TR Max Shree 3.06 m/s    MV Peak A Shree 0.53 m/s    PV Peak S Shree 0.44 m/s    PV Peak D Shree 0.64 m/s    LV Systolic Volume 69.32 mL    LV Systolic Volume Index 30.4 mL/m2    LV Diastolic Volume 102.41 mL    LV Diastolic Volume Index 44.86 mL/m2    LA Volume Index 26.1 mL/m2    LV Mass Index 126.2 g/m2    RA Major Axis 4.58 cm    Left Atrium Minor Axis 5.39 cm    Left Atrium Major Axis 5.62 cm    Triscuspid Valve  Regurgitation Peak Gradient 37.45 mmHg    RA Width 4.30 cm    Right Atrial Pressure (from IVC) 3 mmHg    TV rest pulmonary artery pressure 40 mmHg     Assessment and Plan:     VT (ventricular tachycardia)  49 yo M with PMHx DM2 (A1c 9.1), HTN, DLD, active smoking (30 py) admitted to OSH on 4/8 with late presenting MI (trop > 6.6) s/p LHC demonstrating 80% LAD lesion s/p PCI/SHIRA. Post procedural course c/b recurrent polymorphic VT s/p multiple shocks, amio bolus/ infusion, and ultimately IABP  placement on 4/10. Transferred to [facility] on 4/11 for higher level of care due to refractory PMVT. On 4/12/19, pt went to Cath lab and had PCI with SHIRA x1 to ostial- prox LAD region. He is being monitored in the CCU with no further VT/VF events.       Recommendations:  -- Left ventricular unloading and management of cardiogenic shock per primary team.  -- Continue lidocaine 1 mg/min and amiodarone 0.5 mg/min for 48 hours post-second re-vascularization procedure  -- Monitor and replete electrolytes, K>4, Mg>2.  -- Will need LifeVest on discharge    Breana Mclaughlin MD  Cardiac Electrophysiology  [facility]

## 2022-03-07 NOTE — ED ADULT NURSE NOTE - COVID-19 RESULT DATE/TIME
Compression Applied to Bilateral  Short Stretch Compression Applied to Bilateral  Tubigrip Size G      Sarah Stone LPN on 3/7/2022 at 11:16 AM        
02-Mar-2021 04:17

## 2022-03-19 ENCOUNTER — EMERGENCY (EMERGENCY)
Facility: HOSPITAL | Age: 32
LOS: 1 days | Discharge: HOME | End: 2022-03-19
Attending: STUDENT IN AN ORGANIZED HEALTH CARE EDUCATION/TRAINING PROGRAM
Payer: MEDICAID

## 2022-03-19 VITALS
OXYGEN SATURATION: 97 % | HEIGHT: 71 IN | TEMPERATURE: 98 F | WEIGHT: 270.95 LBS | SYSTOLIC BLOOD PRESSURE: 123 MMHG | HEART RATE: 100 BPM | RESPIRATION RATE: 16 BRPM | DIASTOLIC BLOOD PRESSURE: 83 MMHG

## 2022-03-19 LAB
ALBUMIN SERPL ELPH-MCNC: 3.2 G/DL — LOW (ref 3.5–5)
ALP SERPL-CCNC: 87 U/L — SIGNIFICANT CHANGE UP (ref 40–120)
ALT FLD-CCNC: 30 U/L DA — SIGNIFICANT CHANGE UP (ref 10–60)
ANION GAP SERPL CALC-SCNC: 5 MMOL/L — SIGNIFICANT CHANGE UP (ref 5–17)
AST SERPL-CCNC: 14 U/L — SIGNIFICANT CHANGE UP (ref 10–40)
BASOPHILS # BLD AUTO: 0.04 K/UL — SIGNIFICANT CHANGE UP (ref 0–0.2)
BASOPHILS NFR BLD AUTO: 0.5 % — SIGNIFICANT CHANGE UP (ref 0–2)
BILIRUB SERPL-MCNC: 0.2 MG/DL — SIGNIFICANT CHANGE UP (ref 0.2–1.2)
BUN SERPL-MCNC: 16 MG/DL — SIGNIFICANT CHANGE UP (ref 7–18)
CALCIUM SERPL-MCNC: 8.7 MG/DL — SIGNIFICANT CHANGE UP (ref 8.4–10.5)
CHLORIDE SERPL-SCNC: 105 MMOL/L — SIGNIFICANT CHANGE UP (ref 96–108)
CO2 SERPL-SCNC: 30 MMOL/L — SIGNIFICANT CHANGE UP (ref 22–31)
CREAT SERPL-MCNC: 0.91 MG/DL — SIGNIFICANT CHANGE UP (ref 0.5–1.3)
EGFR: 115 ML/MIN/1.73M2 — SIGNIFICANT CHANGE UP
EOSINOPHIL # BLD AUTO: 0.2 K/UL — SIGNIFICANT CHANGE UP (ref 0–0.5)
EOSINOPHIL NFR BLD AUTO: 2.7 % — SIGNIFICANT CHANGE UP (ref 0–6)
GLUCOSE SERPL-MCNC: 91 MG/DL — SIGNIFICANT CHANGE UP (ref 70–99)
HCT VFR BLD CALC: 40.1 % — SIGNIFICANT CHANGE UP (ref 39–50)
HGB BLD-MCNC: 12.2 G/DL — LOW (ref 13–17)
IMM GRANULOCYTES NFR BLD AUTO: 0.4 % — SIGNIFICANT CHANGE UP (ref 0–1.5)
INR BLD: 1.02 RATIO — SIGNIFICANT CHANGE UP (ref 0.88–1.16)
LIDOCAIN IGE QN: 130 U/L — SIGNIFICANT CHANGE UP (ref 73–393)
LYMPHOCYTES # BLD AUTO: 1.48 K/UL — SIGNIFICANT CHANGE UP (ref 1–3.3)
LYMPHOCYTES # BLD AUTO: 20.2 % — SIGNIFICANT CHANGE UP (ref 13–44)
MCHC RBC-ENTMCNC: 22.9 PG — LOW (ref 27–34)
MCHC RBC-ENTMCNC: 30.4 GM/DL — LOW (ref 32–36)
MCV RBC AUTO: 75.2 FL — LOW (ref 80–100)
MONOCYTES # BLD AUTO: 0.8 K/UL — SIGNIFICANT CHANGE UP (ref 0–0.9)
MONOCYTES NFR BLD AUTO: 10.9 % — SIGNIFICANT CHANGE UP (ref 2–14)
NEUTROPHILS # BLD AUTO: 4.78 K/UL — SIGNIFICANT CHANGE UP (ref 1.8–7.4)
NEUTROPHILS NFR BLD AUTO: 65.3 % — SIGNIFICANT CHANGE UP (ref 43–77)
NRBC # BLD: 0 /100 WBCS — SIGNIFICANT CHANGE UP (ref 0–0)
PLATELET # BLD AUTO: 262 K/UL — SIGNIFICANT CHANGE UP (ref 150–400)
POTASSIUM SERPL-MCNC: 4.3 MMOL/L — SIGNIFICANT CHANGE UP (ref 3.5–5.3)
POTASSIUM SERPL-SCNC: 4.3 MMOL/L — SIGNIFICANT CHANGE UP (ref 3.5–5.3)
PROT SERPL-MCNC: 6.7 G/DL — SIGNIFICANT CHANGE UP (ref 6–8.3)
PROTHROM AB SERPL-ACNC: 12.1 SEC — SIGNIFICANT CHANGE UP (ref 10.5–13.4)
RBC # BLD: 5.33 M/UL — SIGNIFICANT CHANGE UP (ref 4.2–5.8)
RBC # FLD: 16.1 % — HIGH (ref 10.3–14.5)
SODIUM SERPL-SCNC: 140 MMOL/L — SIGNIFICANT CHANGE UP (ref 135–145)
TROPONIN I, HIGH SENSITIVITY RESULT: 9.7 NG/L — SIGNIFICANT CHANGE UP
WBC # BLD: 7.33 K/UL — SIGNIFICANT CHANGE UP (ref 3.8–10.5)
WBC # FLD AUTO: 7.33 K/UL — SIGNIFICANT CHANGE UP (ref 3.8–10.5)

## 2022-03-19 PROCEDURE — 93010 ELECTROCARDIOGRAM REPORT: CPT

## 2022-03-19 PROCEDURE — 85025 COMPLETE CBC W/AUTO DIFF WBC: CPT

## 2022-03-19 PROCEDURE — 99285 EMERGENCY DEPT VISIT HI MDM: CPT

## 2022-03-19 PROCEDURE — 85610 PROTHROMBIN TIME: CPT

## 2022-03-19 PROCEDURE — 84484 ASSAY OF TROPONIN QUANT: CPT

## 2022-03-19 PROCEDURE — 71046 X-RAY EXAM CHEST 2 VIEWS: CPT

## 2022-03-19 PROCEDURE — 93005 ELECTROCARDIOGRAM TRACING: CPT

## 2022-03-19 PROCEDURE — 71046 X-RAY EXAM CHEST 2 VIEWS: CPT | Mod: 26

## 2022-03-19 PROCEDURE — 80053 COMPREHEN METABOLIC PANEL: CPT

## 2022-03-19 PROCEDURE — 99285 EMERGENCY DEPT VISIT HI MDM: CPT | Mod: 25

## 2022-03-19 PROCEDURE — 83690 ASSAY OF LIPASE: CPT

## 2022-03-19 PROCEDURE — 36415 COLL VENOUS BLD VENIPUNCTURE: CPT

## 2022-03-19 RX ORDER — MIRTAZAPINE 45 MG/1
15 TABLET, ORALLY DISINTEGRATING ORAL ONCE
Refills: 0 | Status: COMPLETED | OUTPATIENT
Start: 2022-03-19 | End: 2022-03-19

## 2022-03-19 RX ADMIN — MIRTAZAPINE 15 MILLIGRAM(S): 45 TABLET, ORALLY DISINTEGRATING ORAL at 21:24

## 2022-03-19 RX ADMIN — Medication 1 MILLIGRAM(S): at 21:24

## 2022-03-19 RX ADMIN — Medication 15 MILLIGRAM(S): at 21:24

## 2022-03-19 NOTE — ED PROVIDER NOTE - CLINICAL SUMMARY MEDICAL DECISION MAKING FREE TEXT BOX
30 y/o male, has history of HLD, hypothyroidism, autism, asthma, he lives in a group home, coming in w/ left-sided chest tightness likely from marijuana use. ECG normal. Will check labs and CXR and reassess. Patient declines Albuterol. No indication for laceration repair. No indication for Psych consult given lack of SI, HI, or hallucinations and chronic unchanged nature of pt's cutting behaviors.

## 2022-03-19 NOTE — ED PROVIDER NOTE - PATIENT PORTAL LINK FT
You can access the FollowMyHealth Patient Portal offered by [facility] by registering at the following website: [email or web address]. By joining [employer]’s FollowMyHealth portal, you will also be able to view your health information using other applications (apps) compatible with our system.

## 2022-03-19 NOTE — ED ADULT TRIAGE NOTE - CADM TRG TX PRIOR TO ARRIVAL
1. Omer will start Intuniv 1mg in the evening. Parents can use left over guanfacine during the day if neede.     2. Mom will call school district to arrange for evaluation and possible IEP.     3. Follow up in 4-6 weeks.   
none

## 2022-03-19 NOTE — ED PROVIDER NOTE - OBJECTIVE STATEMENT
30 y/o male, has history of HLD, hypothyroidism, autism, asthma, he lives in a group home, coming in w/ left-sided chest tightness. States it started after he used his vape pen. Nonradiating, nonexertional, nonpleuritic, not having it now. Denies fevers, chills, cough, runny nose. No other symptoms. Has superficial healing cuts over his body, denies SI/HI/hallucinations, states he has a hx of cutting behavior. Spoke to pt's caretaker at the group home who corroborates this story.

## 2022-03-19 NOTE — ED PROVIDER NOTE - QRS
Patient achieved a 15% reduction in IOP from pretreatment levels or a plan is in place to achieve this goal. 82

## 2022-03-19 NOTE — ED ADULT NURSE NOTE - OBJECTIVE STATEMENT
Patient presents to ED from group home for chest pain and runny nose. No acute distress noted.  Patient is calm, cooperative today.

## 2022-03-19 NOTE — ED PROVIDER NOTE - SKIN, MLM
Skin normal color for race, warm, dry and intact. No evidence of rash. Multiple healed wounds, superficial 3cm laceration over L shoulder (to skin level)

## 2022-03-20 PROBLEM — E78.5 HYPERLIPIDEMIA, UNSPECIFIED: Chronic | Status: ACTIVE | Noted: 2022-03-10

## 2022-03-27 ENCOUNTER — EMERGENCY (EMERGENCY)
Facility: HOSPITAL | Age: 32
LOS: 1 days | Discharge: HOME | End: 2022-03-27
Attending: STUDENT IN AN ORGANIZED HEALTH CARE EDUCATION/TRAINING PROGRAM
Payer: MEDICAID

## 2022-03-27 VITALS
TEMPERATURE: 98 F | DIASTOLIC BLOOD PRESSURE: 88 MMHG | WEIGHT: 121.92 LBS | HEART RATE: 86 BPM | SYSTOLIC BLOOD PRESSURE: 132 MMHG | HEIGHT: 71 IN | RESPIRATION RATE: 18 BRPM | OXYGEN SATURATION: 98 %

## 2022-03-27 PROCEDURE — 99283 EMERGENCY DEPT VISIT LOW MDM: CPT

## 2022-03-27 RX ORDER — IBUPROFEN 200 MG
600 TABLET ORAL ONCE
Refills: 0 | Status: COMPLETED | OUTPATIENT
Start: 2022-03-27 | End: 2022-03-27

## 2022-03-27 RX ADMIN — Medication 600 MILLIGRAM(S): at 21:08

## 2022-03-27 NOTE — ED PROVIDER NOTE - OBJECTIVE STATEMENT
31M, autism, asthma, intellectual disability, presenting with dental pain. patient reports he has a tooth infection and has pain. has appointment with dentist tomorrow. no fever, chest pain, shortness of breath.

## 2022-03-27 NOTE — ED PROVIDER NOTE - CLINICAL SUMMARY MEDICAL DECISION MAKING FREE TEXT BOX
31M presenting with tooth pain. has known cavity/infection. dental appointment tomorrow. requesting ibuprofen. no concern for ludgwig's, no obvious signs of abscess. stable for discharge with outpatient follow-up.

## 2022-03-27 NOTE — ED PROVIDER NOTE - PATIENT PORTAL LINK FT
You can access the FollowMyHealth Patient Portal offered by [facility] by registering at the following website: [email or web address]. By joining [employer]’s FollowMyHealth portal, you will also be able to view your health information using other applications (apps) compatible with our system.

## 2022-03-27 NOTE — ED PROVIDER NOTE - NSFOLLOWUPINSTRUCTIONS_ED_ALL_ED_FT
You were seen in the emergency department for dental pain.     Please follow-up with your dentist in the next 24-48 hours.     If you have any worsening symptoms, severe facial  pain, swelling or redness, please return to the emergency department.

## 2022-03-27 NOTE — ED PROVIDER NOTE - NSICDXPASTMEDICALHX_GEN_ALL_CORE_FT
PAST MEDICAL HISTORY:  Asthma     Autism     Dyslipidemia     Enuresis     Factitious disorder     GERD (gastroesophageal reflux disease)     History of BPH     HLD (hyperlipidemia)     Hypothyroid     Hypothyroidism     Intellectual disability     Mood disorder     Myopia of both eyes     Urinary retention

## 2022-03-27 NOTE — ED PROVIDER NOTE - PHYSICAL EXAMINATION
General: well appearing male, no acute distress   HEENT: normocephalic, atraumatic, no lymphadenopathy    Respiratory: normal work of breathing  MSK: no swelling or tenderness of lower extremities, moving all extremities spontaneously   Skin: warm, dry   Neuro: A&Ox3  Psych: appropriate affect

## 2022-03-29 ENCOUNTER — INPATIENT (INPATIENT)
Facility: HOSPITAL | Age: 32
LOS: 2 days | Discharge: NURSING, REHAB OR LONG-TERM CARE | DRG: 696 | End: 2022-04-01
Attending: STUDENT IN AN ORGANIZED HEALTH CARE EDUCATION/TRAINING PROGRAM | Admitting: STUDENT IN AN ORGANIZED HEALTH CARE EDUCATION/TRAINING PROGRAM
Payer: MEDICAID

## 2022-03-29 VITALS
TEMPERATURE: 98 F | RESPIRATION RATE: 18 BRPM | HEIGHT: 71 IN | HEART RATE: 89 BPM | SYSTOLIC BLOOD PRESSURE: 116 MMHG | WEIGHT: 270.07 LBS | DIASTOLIC BLOOD PRESSURE: 86 MMHG | OXYGEN SATURATION: 98 %

## 2022-03-29 LAB
ALBUMIN SERPL ELPH-MCNC: 3.3 G/DL — LOW (ref 3.5–5)
ALP SERPL-CCNC: 86 U/L — SIGNIFICANT CHANGE UP (ref 40–120)
ALT FLD-CCNC: 30 U/L DA — SIGNIFICANT CHANGE UP (ref 10–60)
ANION GAP SERPL CALC-SCNC: 6 MMOL/L — SIGNIFICANT CHANGE UP (ref 5–17)
APPEARANCE UR: CLEAR — SIGNIFICANT CHANGE UP
AST SERPL-CCNC: 18 U/L — SIGNIFICANT CHANGE UP (ref 10–40)
BASOPHILS # BLD AUTO: 0.06 K/UL — SIGNIFICANT CHANGE UP (ref 0–0.2)
BASOPHILS NFR BLD AUTO: 0.8 % — SIGNIFICANT CHANGE UP (ref 0–2)
BILIRUB SERPL-MCNC: 0.1 MG/DL — LOW (ref 0.2–1.2)
BILIRUB UR-MCNC: NEGATIVE — SIGNIFICANT CHANGE UP
BUN SERPL-MCNC: 12 MG/DL — SIGNIFICANT CHANGE UP (ref 7–18)
CALCIUM SERPL-MCNC: 8.7 MG/DL — SIGNIFICANT CHANGE UP (ref 8.4–10.5)
CHLORIDE SERPL-SCNC: 105 MMOL/L — SIGNIFICANT CHANGE UP (ref 96–108)
CO2 SERPL-SCNC: 28 MMOL/L — SIGNIFICANT CHANGE UP (ref 22–31)
COLOR SPEC: YELLOW — SIGNIFICANT CHANGE UP
CREAT SERPL-MCNC: 0.79 MG/DL — SIGNIFICANT CHANGE UP (ref 0.5–1.3)
DIFF PNL FLD: NEGATIVE — SIGNIFICANT CHANGE UP
EGFR: 121 ML/MIN/1.73M2 — SIGNIFICANT CHANGE UP
EOSINOPHIL # BLD AUTO: 0.38 K/UL — SIGNIFICANT CHANGE UP (ref 0–0.5)
EOSINOPHIL NFR BLD AUTO: 5 % — SIGNIFICANT CHANGE UP (ref 0–6)
GLUCOSE SERPL-MCNC: 113 MG/DL — HIGH (ref 70–99)
GLUCOSE UR QL: NEGATIVE — SIGNIFICANT CHANGE UP
HCT VFR BLD CALC: 40.8 % — SIGNIFICANT CHANGE UP (ref 39–50)
HGB BLD-MCNC: 12.6 G/DL — LOW (ref 13–17)
IMM GRANULOCYTES NFR BLD AUTO: 0.7 % — SIGNIFICANT CHANGE UP (ref 0–1.5)
KETONES UR-MCNC: NEGATIVE — SIGNIFICANT CHANGE UP
LEUKOCYTE ESTERASE UR-ACNC: NEGATIVE — SIGNIFICANT CHANGE UP
LIDOCAIN IGE QN: 142 U/L — SIGNIFICANT CHANGE UP (ref 73–393)
LYMPHOCYTES # BLD AUTO: 2.41 K/UL — SIGNIFICANT CHANGE UP (ref 1–3.3)
LYMPHOCYTES # BLD AUTO: 32 % — SIGNIFICANT CHANGE UP (ref 13–44)
MCHC RBC-ENTMCNC: 23.1 PG — LOW (ref 27–34)
MCHC RBC-ENTMCNC: 30.9 GM/DL — LOW (ref 32–36)
MCV RBC AUTO: 74.9 FL — LOW (ref 80–100)
MONOCYTES # BLD AUTO: 0.62 K/UL — SIGNIFICANT CHANGE UP (ref 0–0.9)
MONOCYTES NFR BLD AUTO: 8.2 % — SIGNIFICANT CHANGE UP (ref 2–14)
NEUTROPHILS # BLD AUTO: 4.01 K/UL — SIGNIFICANT CHANGE UP (ref 1.8–7.4)
NEUTROPHILS NFR BLD AUTO: 53.3 % — SIGNIFICANT CHANGE UP (ref 43–77)
NITRITE UR-MCNC: NEGATIVE — SIGNIFICANT CHANGE UP
NRBC # BLD: 0 /100 WBCS — SIGNIFICANT CHANGE UP (ref 0–0)
PH UR: 7 — SIGNIFICANT CHANGE UP (ref 5–8)
PLATELET # BLD AUTO: 288 K/UL — SIGNIFICANT CHANGE UP (ref 150–400)
POTASSIUM SERPL-MCNC: 4.5 MMOL/L — SIGNIFICANT CHANGE UP (ref 3.5–5.3)
POTASSIUM SERPL-SCNC: 4.5 MMOL/L — SIGNIFICANT CHANGE UP (ref 3.5–5.3)
PROT SERPL-MCNC: 6.8 G/DL — SIGNIFICANT CHANGE UP (ref 6–8.3)
PROT UR-MCNC: NEGATIVE — SIGNIFICANT CHANGE UP
RBC # BLD: 5.45 M/UL — SIGNIFICANT CHANGE UP (ref 4.2–5.8)
RBC # FLD: 15.9 % — HIGH (ref 10.3–14.5)
SARS-COV-2 RNA SPEC QL NAA+PROBE: SIGNIFICANT CHANGE UP
SODIUM SERPL-SCNC: 139 MMOL/L — SIGNIFICANT CHANGE UP (ref 135–145)
SP GR SPEC: 1.01 — SIGNIFICANT CHANGE UP (ref 1.01–1.02)
UROBILINOGEN FLD QL: NEGATIVE — SIGNIFICANT CHANGE UP
WBC # BLD: 7.53 K/UL — SIGNIFICANT CHANGE UP (ref 3.8–10.5)
WBC # FLD AUTO: 7.53 K/UL — SIGNIFICANT CHANGE UP (ref 3.8–10.5)

## 2022-03-29 PROCEDURE — 99285 EMERGENCY DEPT VISIT HI MDM: CPT

## 2022-03-29 RX ORDER — SODIUM CHLORIDE 9 MG/ML
1000 INJECTION INTRAMUSCULAR; INTRAVENOUS; SUBCUTANEOUS ONCE
Refills: 0 | Status: COMPLETED | OUTPATIENT
Start: 2022-03-29 | End: 2022-03-29

## 2022-03-29 RX ADMIN — SODIUM CHLORIDE 1000 MILLILITER(S): 9 INJECTION INTRAMUSCULAR; INTRAVENOUS; SUBCUTANEOUS at 20:31

## 2022-03-29 NOTE — ED ADULT NURSE NOTE - ED STAT RN HANDOFF DETAILS
pt.remained  stable.denies pain. admitted to medicine for urinary retention. transfer to  619B report given to lou tony. no acute distress noted

## 2022-03-29 NOTE — CHART NOTE - NSCHARTNOTEFT_GEN_A_CORE
Consult for [facility] home return.    Pt is a 31 year old  male who was brought to the ED from [facility] with the complaint of abdominal pain, diarrhea and inability to [id number]. Pt was visited at bedside re consult, he appeared alert and oriented x3. January introduced self, role and purpose of visit explained. Pt reports his inability to urinate since yesterday and punching the wall due to stress. Emotional support provided. Pt was encouraged to make needs known appropriately / use his words instead of punching the wall and bruising his knuckles. Pt was receptive to counseling by verbalizing understanding. D/c disposition unknown at this time.

## 2022-03-29 NOTE — ED ADULT NURSE NOTE - PAIN RATING/NUMBER SCALE (0-10): REST
Patient states Dr. Powell was supposed to fill her full prescription of the XANAX but accidentally did the partial fill again. Patient is out of medication and would appreciate someone fixing this and reaching back out to her when done.    
Please let patient know, full 30 day prescription for Xanax was authorized.       PDMP reviewed; no aberrant behavior identified, prescription authorized.  Alprazolam  1MG / Tablet  Rx# [id number] Benzodiazepine Qty: 20  Days: 15  Refills: 0 Prescribed: 9/14/2020  Dispensed: 9/21/2020  Sold: 9/21/2020 CLOTILDE HOGAN, [facility]  [facility] [id number]    
Writer spoke with patient and informed her that a 30 day prescription has been sent to the pharmacy. Pharmacy was made aware of the new prescription and patient should be able to pick it up. No further details needed.   
10

## 2022-03-29 NOTE — ED ADULT TRIAGE NOTE - CHIEF COMPLAINT QUOTE
biba for abdominal pain dirrhea and unable to urinte since yeterday,pt states I have also been stressed lately so I punched the wall with left hand pain,no SI/ NO hi

## 2022-03-29 NOTE — ED ADULT NURSE NOTE - CHPI ED NUR TIMING2
No significant events overnight.  Patient remains free from falls, injuries, and skin breakdowns.  External catheter in place for patient to void rt incontinence, care performed.  VSS on RA throughout the night.  Positioned Q2 hours, tolerated well.  Pain well controlled on PO meds.  Neuro checks remain unchanged from patient's baseline.  Plan of care reviewed with patient, and all questions answered.  Bed in lowest position.  Call light within Reach.  Purposeful rounding performed.  Patient is resting comfortably in bed, no complications at this time.     sudden onset

## 2022-03-29 NOTE — ED ADULT NURSE NOTE - OBJECTIVE STATEMENT
patient patient present to Ed with c/o abdominal pain and N/V/D since yesterday with inability to urinate since yesterday , SOB. patient also notes bruising to left hand from him punching a walk due to stress , he has been depressed and has had thoughts of hurting himself. Yellow gown

## 2022-03-30 DIAGNOSIS — R33.9 RETENTION OF URINE, UNSPECIFIED: ICD-10-CM

## 2022-03-30 DIAGNOSIS — E03.9 HYPOTHYROIDISM, UNSPECIFIED: ICD-10-CM

## 2022-03-30 DIAGNOSIS — Z29.9 ENCOUNTER FOR PROPHYLACTIC MEASURES, UNSPECIFIED: ICD-10-CM

## 2022-03-30 DIAGNOSIS — F79 UNSPECIFIED INTELLECTUAL DISABILITIES: ICD-10-CM

## 2022-03-30 DIAGNOSIS — F39 UNSPECIFIED MOOD [AFFECTIVE] DISORDER: ICD-10-CM

## 2022-03-30 DIAGNOSIS — J45.909 UNSPECIFIED ASTHMA, UNCOMPLICATED: ICD-10-CM

## 2022-03-30 DIAGNOSIS — R73.03 PREDIABETES: ICD-10-CM

## 2022-03-30 DIAGNOSIS — D50.9 IRON DEFICIENCY ANEMIA, UNSPECIFIED: ICD-10-CM

## 2022-03-30 DIAGNOSIS — E78.5 HYPERLIPIDEMIA, UNSPECIFIED: ICD-10-CM

## 2022-03-30 DIAGNOSIS — K21.9 GASTRO-ESOPHAGEAL REFLUX DISEASE WITHOUT ESOPHAGITIS: ICD-10-CM

## 2022-03-30 LAB
ALBUMIN SERPL ELPH-MCNC: 3.1 G/DL — LOW (ref 3.5–5)
ALP SERPL-CCNC: 77 U/L — SIGNIFICANT CHANGE UP (ref 40–120)
ALT FLD-CCNC: 27 U/L DA — SIGNIFICANT CHANGE UP (ref 10–60)
ANION GAP SERPL CALC-SCNC: 6 MMOL/L — SIGNIFICANT CHANGE UP (ref 5–17)
AST SERPL-CCNC: 15 U/L — SIGNIFICANT CHANGE UP (ref 10–40)
BILIRUB SERPL-MCNC: 0.3 MG/DL — SIGNIFICANT CHANGE UP (ref 0.2–1.2)
BUN SERPL-MCNC: 12 MG/DL — SIGNIFICANT CHANGE UP (ref 7–18)
CALCIUM SERPL-MCNC: 8.7 MG/DL — SIGNIFICANT CHANGE UP (ref 8.4–10.5)
CHLORIDE SERPL-SCNC: 106 MMOL/L — SIGNIFICANT CHANGE UP (ref 96–108)
CO2 SERPL-SCNC: 27 MMOL/L — SIGNIFICANT CHANGE UP (ref 22–31)
CREAT SERPL-MCNC: 0.77 MG/DL — SIGNIFICANT CHANGE UP (ref 0.5–1.3)
EGFR: 122 ML/MIN/1.73M2 — SIGNIFICANT CHANGE UP
GLUCOSE BLDC GLUCOMTR-MCNC: 107 MG/DL — HIGH (ref 70–99)
GLUCOSE BLDC GLUCOMTR-MCNC: 107 MG/DL — HIGH (ref 70–99)
GLUCOSE BLDC GLUCOMTR-MCNC: 96 MG/DL — SIGNIFICANT CHANGE UP (ref 70–99)
GLUCOSE SERPL-MCNC: 96 MG/DL — SIGNIFICANT CHANGE UP (ref 70–99)
HCT VFR BLD CALC: 40.5 % — SIGNIFICANT CHANGE UP (ref 39–50)
HGB BLD-MCNC: 12.7 G/DL — LOW (ref 13–17)
MAGNESIUM SERPL-MCNC: 1.7 MG/DL — SIGNIFICANT CHANGE UP (ref 1.6–2.6)
MCHC RBC-ENTMCNC: 23.2 PG — LOW (ref 27–34)
MCHC RBC-ENTMCNC: 31.4 GM/DL — LOW (ref 32–36)
MCV RBC AUTO: 74 FL — LOW (ref 80–100)
NRBC # BLD: 0 /100 WBCS — SIGNIFICANT CHANGE UP (ref 0–0)
PHOSPHATE SERPL-MCNC: 3.8 MG/DL — SIGNIFICANT CHANGE UP (ref 2.5–4.5)
PLATELET # BLD AUTO: 260 K/UL — SIGNIFICANT CHANGE UP (ref 150–400)
POTASSIUM SERPL-MCNC: 4.1 MMOL/L — SIGNIFICANT CHANGE UP (ref 3.5–5.3)
POTASSIUM SERPL-SCNC: 4.1 MMOL/L — SIGNIFICANT CHANGE UP (ref 3.5–5.3)
PROT SERPL-MCNC: 6.6 G/DL — SIGNIFICANT CHANGE UP (ref 6–8.3)
RBC # BLD: 5.47 M/UL — SIGNIFICANT CHANGE UP (ref 4.2–5.8)
RBC # FLD: 15.8 % — HIGH (ref 10.3–14.5)
SODIUM SERPL-SCNC: 139 MMOL/L — SIGNIFICANT CHANGE UP (ref 135–145)
VALPROATE SERPL-MCNC: 33 UG/ML — LOW (ref 50–100)
WBC # BLD: 7.25 K/UL — SIGNIFICANT CHANGE UP (ref 3.8–10.5)
WBC # FLD AUTO: 7.25 K/UL — SIGNIFICANT CHANGE UP (ref 3.8–10.5)

## 2022-03-30 PROCEDURE — 76775 US EXAM ABDO BACK WALL LIM: CPT | Mod: 26

## 2022-03-30 PROCEDURE — 99222 1ST HOSP IP/OBS MODERATE 55: CPT | Mod: GC

## 2022-03-30 RX ORDER — FINASTERIDE 5 MG/1
1 TABLET, FILM COATED ORAL
Qty: 0 | Refills: 0 | DISCHARGE

## 2022-03-30 RX ORDER — FINASTERIDE 5 MG/1
5 TABLET, FILM COATED ORAL DAILY
Refills: 0 | Status: DISCONTINUED | OUTPATIENT
Start: 2022-03-30 | End: 2022-04-01

## 2022-03-30 RX ORDER — TAMSULOSIN HYDROCHLORIDE 0.4 MG/1
0.4 CAPSULE ORAL AT BEDTIME
Refills: 0 | Status: DISCONTINUED | OUTPATIENT
Start: 2022-03-30 | End: 2022-04-01

## 2022-03-30 RX ORDER — GUANFACINE 3 MG/1
1 TABLET, EXTENDED RELEASE ORAL
Qty: 0 | Refills: 0 | DISCHARGE

## 2022-03-30 RX ORDER — FERROUS SULFATE 325(65) MG
325 TABLET ORAL DAILY
Refills: 0 | Status: DISCONTINUED | OUTPATIENT
Start: 2022-03-30 | End: 2022-04-01

## 2022-03-30 RX ORDER — PANTOPRAZOLE SODIUM 20 MG/1
1 TABLET, DELAYED RELEASE ORAL
Qty: 0 | Refills: 0 | DISCHARGE

## 2022-03-30 RX ORDER — DIVALPROEX SODIUM 500 MG/1
0 TABLET, DELAYED RELEASE ORAL
Qty: 0 | Refills: 0 | DISCHARGE

## 2022-03-30 RX ORDER — LEVOTHYROXINE SODIUM 125 MCG
50 TABLET ORAL DAILY
Refills: 0 | Status: DISCONTINUED | OUTPATIENT
Start: 2022-03-30 | End: 2022-04-01

## 2022-03-30 RX ORDER — PANTOPRAZOLE SODIUM 20 MG/1
40 TABLET, DELAYED RELEASE ORAL
Refills: 0 | Status: DISCONTINUED | OUTPATIENT
Start: 2022-03-30 | End: 2022-04-01

## 2022-03-30 RX ORDER — MIRTAZAPINE 45 MG/1
15 TABLET, ORALLY DISINTEGRATING ORAL AT BEDTIME
Refills: 0 | Status: DISCONTINUED | OUTPATIENT
Start: 2022-03-30 | End: 2022-04-01

## 2022-03-30 RX ORDER — RISPERIDONE 4 MG/1
1 TABLET ORAL
Qty: 0 | Refills: 0 | DISCHARGE

## 2022-03-30 RX ORDER — RISPERIDONE 4 MG/1
2 TABLET ORAL EVERY 12 HOURS
Refills: 0 | Status: DISCONTINUED | OUTPATIENT
Start: 2022-03-30 | End: 2022-04-01

## 2022-03-30 RX ORDER — ATORVASTATIN CALCIUM 80 MG/1
10 TABLET, FILM COATED ORAL AT BEDTIME
Refills: 0 | Status: DISCONTINUED | OUTPATIENT
Start: 2022-03-30 | End: 2022-04-01

## 2022-03-30 RX ORDER — DIVALPROEX SODIUM 500 MG/1
500 TABLET, DELAYED RELEASE ORAL EVERY 12 HOURS
Refills: 0 | Status: DISCONTINUED | OUTPATIENT
Start: 2022-03-30 | End: 2022-04-01

## 2022-03-30 RX ORDER — BENZTROPINE MESYLATE 1 MG
1 TABLET ORAL
Qty: 0 | Refills: 0 | DISCHARGE

## 2022-03-30 RX ORDER — TAMSULOSIN HYDROCHLORIDE 0.4 MG/1
1 CAPSULE ORAL
Qty: 0 | Refills: 0 | DISCHARGE

## 2022-03-30 RX ORDER — ALBUTEROL 90 UG/1
2 AEROSOL, METERED ORAL EVERY 6 HOURS
Refills: 0 | Status: DISCONTINUED | OUTPATIENT
Start: 2022-03-30 | End: 2022-04-01

## 2022-03-30 RX ADMIN — ATORVASTATIN CALCIUM 10 MILLIGRAM(S): 80 TABLET, FILM COATED ORAL at 22:13

## 2022-03-30 RX ADMIN — MIRTAZAPINE 15 MILLIGRAM(S): 45 TABLET, ORALLY DISINTEGRATING ORAL at 22:13

## 2022-03-30 RX ADMIN — RISPERIDONE 2 MILLIGRAM(S): 4 TABLET ORAL at 06:36

## 2022-03-30 RX ADMIN — Medication 15 MILLIGRAM(S): at 06:37

## 2022-03-30 RX ADMIN — Medication 325 MILLIGRAM(S): at 14:03

## 2022-03-30 RX ADMIN — PANTOPRAZOLE SODIUM 40 MILLIGRAM(S): 20 TABLET, DELAYED RELEASE ORAL at 15:55

## 2022-03-30 RX ADMIN — Medication 1 MILLIGRAM(S): at 22:18

## 2022-03-30 RX ADMIN — DIVALPROEX SODIUM 500 MILLIGRAM(S): 500 TABLET, DELAYED RELEASE ORAL at 06:37

## 2022-03-30 RX ADMIN — Medication 15 MILLIGRAM(S): at 17:16

## 2022-03-30 RX ADMIN — Medication 50 MICROGRAM(S): at 06:36

## 2022-03-30 RX ADMIN — FINASTERIDE 5 MILLIGRAM(S): 5 TABLET, FILM COATED ORAL at 14:03

## 2022-03-30 RX ADMIN — Medication 1 MILLIGRAM(S): at 15:55

## 2022-03-30 RX ADMIN — RISPERIDONE 2 MILLIGRAM(S): 4 TABLET ORAL at 17:15

## 2022-03-30 RX ADMIN — TAMSULOSIN HYDROCHLORIDE 0.4 MILLIGRAM(S): 0.4 CAPSULE ORAL at 22:13

## 2022-03-30 RX ADMIN — DIVALPROEX SODIUM 500 MILLIGRAM(S): 500 TABLET, DELAYED RELEASE ORAL at 17:16

## 2022-03-30 NOTE — H&P ADULT - HISTORY OF PRESENT ILLNESS
32 yo M, from home, ambulates independently, PMHx of autism w/ moderate intellectual disability, asthma, hypothyroidism, obesity, GERD, impulse control disorder, factitious disorder, urinary retention came with chief complain of unable to urinate for one day. Pt stated that he does have weak urinary stream and noted dribbling yesterday he was unable to urinate and was sent to ED. Pt denied any chest pain, sob, N/V/D, dysuria. Spoke to patient mom she said that pt has intermittent urinary retention for a while like 2-3 years and was started on tamsulosin and finasteride. Pt stated that tamsulosin didn't help him much .  32 yo M, from home, ambulates independently, from [facility] group home, PMHx of autism w/ moderate intellectual disability, asthma, hypothyroidism, obesity, GERD, impulse control disorder, factitious disorder, urinary retention came with chief complain of unable to urinate for one day. Pt stated that he does have weak urinary stream and noted dribbling yesterday he was unable to urinate and was sent to ED. Pt denied any chest pain, sob, N/V/D, dysuria. Spoke to patient mom she said that pt has intermittent urinary retention for a while like 2-3 years and was started on tamsulosin and finasteride. Pt stated that tamsulosin didn't help him much. Pt also stated that he was told by urologist outpatient that he might need surgery (primary team  to confirm from skilled nursing in the morning)    Of note pt admitted at [facility] couple of times before and was seen by urologist twice for urinary retention. He was never diagnosed with BPH and per urology it could be behavioural or could be due to poor water intake with alcohol drinking and was told to follow up out patient.     ED course: Sarabia was placed in ED and 600cc poured out. UA neg, renal function normal  32 yo M, from home, ambulates independently, from [facility] group home, PMHx of autism w/ moderate intellectual disability, asthma, hypothyroidism, obesity, pre DM, GERD, impulse control disorder, factitious disorder, urinary retention came with chief complain of unable to urinate for one day. Pt stated that he does have weak urinary stream and noted dribbling yesterday he was unable to urinate and was sent to ED. Pt denied any chest pain, sob, N/V/D, dysuria. Spoke to patient mom she said that pt has intermittent urinary retention for a while like 2-3 years and was started on tamsulosin and finasteride. Pt stated that tamsulosin didn't help him much. Pt also stated that he was told by urologist outpatient that he might need surgery (primary team  to confirm from half-way in the morning)    Of note pt admitted at [facility] couple of times before and was seen by urologist twice for urinary retention. He was never diagnosed with BPH and per urology it could be behavioural or could be due to poor water intake with alcohol drinking and was told to follow up out patient.     ED course: Sarabia was placed in ED and 600cc poured out. UA neg, renal function normal

## 2022-03-30 NOTE — H&P ADULT - PROBLEM SELECTOR PLAN 3
-Schizoaffective disorder   -Pt is on risperidone, divalproex, mirtazapine and buspirone   -On benztropine for ?EPS  -Benztropine can cause urinary retention too hold off for now  -Resume rest of his antipsychotic medications  -f/u Depakote levels -Schizoaffective disorder   -Pt is on risperidone, divalproex, mirtazapine and buspirone   -On benztropine for ?EPS , not documented why he is on it in his previous admission charts  -Benztropine can cause urinary retention too hold off for now  -Resume rest of his antipsychotic medications  -f/u Depakote levels  -Can consider psych consult for medication adjustment

## 2022-03-30 NOTE — H&P ADULT - PROBLEM SELECTOR PLAN 1
-c/w urinary retention   -Sarabia was placed in ED and 600cc came out  -f/u renal US to r/o hydronephrosis   -On tamsulosin and finasteride at home   -Resume home meds  -Official Urology consult in the morning (ED physician spoke to overnight house officer)

## 2022-03-30 NOTE — H&P ADULT - PROBLEM SELECTOR PLAN 2
Supportive treatment   Will start 1:1 constant observation  If pt starts get agitated Supportive treatment   Will start 1:1 constant observation If pt starts get agitated

## 2022-03-30 NOTE — H&P ADULT - PROBLEM SELECTOR PLAN 8
-Was discharged on Ferrous sulphate last time   -Will resume Hgba1c 6.1 on last admission in Jan fs achs   carb consistent diet

## 2022-03-30 NOTE — CONSULT NOTE ADULT - ASSESSMENT
30 yo M, from home, ambulates independently, from [facility] home, PMHx of autism w/ moderate intellectual disability, asthma, hypothyroidism, obesity, pre DM, GERD, impulse control disorder, factitious disorder, urinary retention came with chief complain of unable to urinate for one day. Pt stated that he does have weak urinary stream and noted dribbling yesterday he was unable to urinate and was sent to ED.    case discussed with Dr Bolivar mckeon  f/u US

## 2022-03-30 NOTE — ED PROVIDER NOTE - TOBACCO USE
pt called and states that her kids put her meds in 3 pill boxes  she states that they did it wrong  she states that she needs to start over    i made her aware that she needs to speak to her kids regarding what they did an how they organized her medications
Unknown if ever smoked

## 2022-03-30 NOTE — PATIENT PROFILE ADULT - FALL HARM RISK - HARM RISK INTERVENTIONS
Assistance with ambulation/Assistance OOB with selected safe patient handling equipment/Communicate Risk of Fall with Harm to all staff/Discuss with provider need for PT consult/Monitor gait and stability/Reinforce activity limits and safety measures with patient and family/Tailored Fall Risk Interventions/Visual Cue: Yellow wristband and red socks/Bed in lowest position, wheels locked, appropriate side rails in place/Call bell, personal items and telephone in reach/Instruct patient to call for assistance before getting out of bed or chair/Non-slip footwear when patient is out of bed/[place] to call system/Physically safe environment - no spills, clutter or unnecessary equipment/Purposeful Proactive Rounding/Room/bathroom lighting operational, light cord in reach

## 2022-03-30 NOTE — ED PROVIDER NOTE - PROGRESS NOTE DETAILS
Dr. Bates informed for admission.  Pt unable to care for Sarabia and group home is apparently not appropriately staffed to care for it either.

## 2022-03-30 NOTE — ED PROVIDER NOTE - OBJECTIVE STATEMENT
30 y/o man, h/o cognitive impairment, facticious disorder, autism, hypothyroidism, asthma, c/o urinary retention and suprapubic discomfort since yesterday.  No fever/chills/nausea/vomiting/back pain.

## 2022-03-30 NOTE — H&P ADULT - NSHPPHYSICALEXAM_GEN_ALL_CORE
Vital Signs Last 24 Hrs  T(C): 36.4 (30 Mar 2022 00:13), Max: 36.6 (29 Mar 2022 17:21)  T(F): 97.5 (30 Mar 2022 00:13), Max: 97.8 (29 Mar 2022 17:21)  HR: 68 (30 Mar 2022 00:13) (68 - 89)  BP: 114/74 (30 Mar 2022 00:13) (114/74 - 116/86)  BP(mean): --  RR: 18 (30 Mar 2022 00:13) (18 - 18)  SpO2: 99% (30 Mar 2022 00:13) (98% - 99%)    GENERAL: NAD, cooperative, Sarabia catheter noted   EYES: EOMI, PERRLA,   NECK: Supple, No JVD  CHEST/LUNG: Clear to auscultation b/l  No rales, rhonchi, wheezing   HEART: Regular rate and rhythm; No murmurs, +ve S1 S2   ABDOMEN: Soft, Nontender, Nondistended; Bowel sounds present  NERVOUS SYSTEM:  Alert & Oriented X3, normal sensations and normal strength     EXTREMITIES:   No clubbing, cyanosis, or edema

## 2022-03-30 NOTE — ED PROVIDER NOTE - CLINICAL SUMMARY MEDICAL DECISION MAKING FREE TEXT BOX
30 y/o man, h/o cognitive impairment, facticious disorder, autism, hypothyroidism, asthma, c/o urinary retention and suprapubic discomfort since yesterday--bladder scanner not working but bedside US shows moderately full bladder; Sarabia placed and yielded about 600 mL of urine, sent for UA and Cx.

## 2022-03-30 NOTE — H&P ADULT - ASSESSMENT
30 yo M, from home, ambulates independently, from [facility], PMHx of autism w/ moderate intellectual disability, asthma, hypothyroidism, obesity, GERD, impulse control disorder, factitious disorder, urinary retention came with chief complain of unable to urinate for one day. Admitted for urinary retention     Patient's mom provided the medication list. Per mom 1 or 2 meds could be missing. Primary team to call Ms. Madera  (from gp home) to confirm all his medications. [phone] 32 yo M, from home, ambulates independently, from [facility], PMHx of autism w/ moderate intellectual disability, asthma, hypothyroidism, obesity, GERD, impulse control disorder, factitious disorder, urinary retention came with chief complain of unable to urinate for one day. Admitted for urinary retention     Patient's mom provided the medication list. Per mom 1 or 2 meds could be missing ?ativan. Primary team to call MsAranza HowardCassy  (from gp home) to confirm all his medications. [phone]. Unsuccessful attempt by MAR

## 2022-03-30 NOTE — CONSULT NOTE ADULT - SUBJECTIVE AND OBJECTIVE BOX
HPI:  32 yo M, from home, ambulates independently, from [facility] group home, PMHx of autism w/ moderate intellectual disability, asthma, hypothyroidism, obesity, pre DM, GERD, impulse control disorder, factitious disorder, urinary retention came with chief complain of unable to urinate for one day. Pt stated that he does have weak urinary stream and noted dribbling yesterday he was unable to urinate and was sent to ED. Pt denied any chest pain, sob, N/V/D, dysuria. Spoke to patient mom she said that pt has intermittent urinary retention for a while like 2-3 years and was started on tamsulosin and finasteride. Pt stated that tamsulosin didn't help him much. Pt also stated that he was told by urologist outpatient that he might need surgery (primary team  to confirm from snf in the morning)    Of note pt admitted at [facility] couple of times before and was seen by urologist twice for urinary retention. He was never diagnosed with BPH and per urology it could be behavioural or could be due to poor water intake with alcohol drinking and was told to follow up out patient.     ED course: Sarabia was placed in ED and 600cc poured out. UA neg, renal function normal  (30 Mar 2022 01:13)      PAST MEDICAL & SURGICAL HISTORY:  Mood disorder    Intellectual disability    Asthma    GERD (gastroesophageal reflux disease)    Factitious disorder    Urinary retention    Enuresis    Myopia of both eyes    Autism    Hypothyroidism    Hypothyroid    History of BPH    Dyslipidemia    HLD (hyperlipidemia)    No significant past surgical history        Vital Signs Last 24 Hrs  T(C): 36.4 (30 Mar 2022 00:13), Max: 36.6 (29 Mar 2022 17:21)  T(F): 97.5 (30 Mar 2022 00:13), Max: 97.8 (29 Mar 2022 17:21)  HR: 68 (30 Mar 2022 00:13) (68 - 89)  BP: 114/74 (30 Mar 2022 00:13) (114/74 - 116/86)  BP(mean): --  RR: 18 (30 Mar 2022 00:13) (18 - 18)  SpO2: 99% (30 Mar 2022 00:13) (98% - 99%)                          12.6   7.53  )-----------( 288      ( 29 Mar 2022 19:33 )             40.8     03-29    139  |  105  |  12  ----------------------------<  113<H>  4.5   |  28  |  0.79    Ca    8.7      29 Mar 2022 19:33    TPro  6.8  /  Alb  3.3<L>  /  TBili  0.1<L>  /  DBili  x   /  AST  18  /  ALT  30  /  AlkPhos  86  03-29        PHYSICAL EXAM  General: WN/WD NAD  Neurology: A&Ox3, nonfocal, SALAS x 4  Respiratory: CTA B/L  CV: RRR, S1S2, no murmurs, rubs or gallops  Abdominal: Soft, NT, ND +BS, Last BM  Extremities: No edema, + peripheral pulses        ASSESSMENT/ PLAN:   HPI:  30 yo M, from home, ambulates independently, from [facility] group home, PMHx of autism w/ moderate intellectual disability, asthma, hypothyroidism, obesity, pre DM, GERD, impulse control disorder, factitious disorder, urinary retention came with chief complain of unable to urinate for one day. Pt stated that he does have weak urinary stream and noted dribbling yesterday he was unable to urinate and was sent to ED. Pt denied any chest pain, sob, N/V/D, dysuria. Spoke to patient mom she said that pt has intermittent urinary retention for a while like 2-3 years and was started on tamsulosin and finasteride. Pt stated that tamsulosin didn't help him much. Pt also stated that he was told by urologist outpatient that he might need surgery (primary team  [facility] from [facility] in the morning)    Of note pt admitted at [facility] couple of times before and was seen by urologist twice for urinary retention. He was never diagnosed with BPH and per urology it could be behavioural or could be due to poor water intake with alcohol drinking and was told to follow up out patient.     ED course: Sarabia was placed in ED and 600cc poured out. UA neg, renal function normal  (30 Mar 2022 01:13)      PAST MEDICAL & SURGICAL HISTORY:  Mood disorder    Intellectual disability    Asthma    GERD (gastroesophageal reflux disease)    Factitious disorder    Urinary retention    Enuresis    Myopia of both eyes    Autism    Hypothyroidism    Hypothyroid    History of BPH    Dyslipidemia    HLD (hyperlipidemia)    No significant past surgical history        Vital Signs Last 24 Hrs  T(C): 36.4 (30 Mar 2022 00:13), Max: 36.6 (29 Mar 2022 17:21)  T(F): 97.5 (30 Mar 2022 00:13), Max: 97.8 (29 Mar 2022 17:21)  HR: 68 (30 Mar 2022 00:13) (68 - 89)  BP: 114/74 (30 Mar 2022 00:13) (114/74 - 116/86)  BP(mean): --  RR: 18 (30 Mar 2022 00:13) (18 - 18)  SpO2: 99% (30 Mar 2022 00:13) (98% - 99%)                          12.6   7.53  )-----------( 288      ( 29 Mar 2022 19:33 )             40.8     03-29    139  |  105  |  12  ----------------------------<  113<H>  4.5   |  28  |  0.79    Ca    8.7      29 Mar 2022 19:33    TPro  6.8  /  Alb  3.3<L>  /  TBili  0.1<L>  /  DBili  x   /  AST  18  /  ALT  30  /  AlkPhos  86  03-29        PHYSICAL EXAM  General: WN/WD NAD  Neurology: Awake, alert  Respiratory: CTA B/L  CV: RRR, S1S2, no murmurs, rubs or gallops  Abdominal: Soft, NT, ND +BS, Last BM  Extremities: No edema, + peripheral pulses        ASSESSMENT/ PLAN:

## 2022-03-30 NOTE — H&P ADULT - ATTENDING COMMENTS
Vital Signs Last 24 Hrs  T(C): 36.4 (30 Mar 2022 00:13), Max: 36.6 (29 Mar 2022 17:21)  T(F): 97.5 (30 Mar 2022 00:13), Max: 97.8 (29 Mar 2022 17:21)  HR: 68 (30 Mar 2022 00:13) (68 - 89)  BP: 114/74 (30 Mar 2022 00:13) (114/74 - 116/86)  BP(mean): --  RR: 18 (30 Mar 2022 00:13) (18 - 18)  SpO2: 99% (30 Mar 2022 00:13) (98% - 99%) Patient s a 30 yo M from [facility] [place], ambulates independently, with PMH of autism w/ moderate intellectual disability, asthma, hypothyroidism, obesity, pre DM, GERD, impulse control disorder, factitious disorder, urinary retention came with chief complain of unable to urinate for one day. Pt stated that he does have weak urinary stream and noted dribbling yesterday he was unable to urinate and was sent to ED. Pt denied any chest pain, sob, N/V/D, dysuria. Spoke to patient mom she said that pt has intermittent urinary retention for a while like 2-3 years and was started on tamsulosin and finasteride. Pt stated that tamsulosin didn't help him much. Pt also stated that he was told by urologist outpatient that he might need surgery (primary team  to confirm from [facility] in the morning)    Of note pt admitted at [facility] couple of times before and was seen by urologist twice for urinary retention. He was never diagnosed with BPH and per urology it could be behavioural or could be due to poor water intake with alcohol drinking and was told to follow up out patient.     ED course: Sarabia was placed in ED and 600cc poured out. UA neg, renal function normal     Vital Signs Last 24 Hrs  T(C): 36.4 (30 Mar 2022 00:13), Max: 36.6 (29 Mar 2022 17:21)  T(F): 97.5 (30 Mar 2022 00:13), Max: 97.8 (29 Mar 2022 17:21)  HR: 68 (30 Mar 2022 00:13) (68 - 89)  BP: 114/74 (30 Mar 2022 00:13) (114/74 - 116/86)  BP(mean): --  RR: 18 (30 Mar 2022 00:13) (18 - 18)  SpO2: 99% (30 Mar 2022 00:13) (98% - 99%)    Physical exam as above    Labs and imaging studies noted.    Assessment and plan: Patient s a 30 yo M from [facility], ambulates independently, with PMH of autism w/ moderate intellectual disability, asthma, hypothyroidism, obesity, pre DM, GERD, impulse control disorder, factitious disorder, h/o urinary retention presented with c/o unable to urinate, dribbling of urine x 1 day, denies dysuria, fever/chills. Collateral history obtained from mother, h/o intermittent urinary retention in last 2-3 years. follows urology outpatient, unclwear etiology, no enlarged prostrate on prior CT.  Of note patient has recent admission for urinary retentions at [facility] couple of times before and was seen by urologist twice for urinary retention. He was never diagnosed with BPH and per urology it could be behavioural or could be due to poor water intake with alcohol drinking and was told to follow up out patient.     In ED patient HD stable, Mckeon was placed in ED, UO 600cc; UA neg, normal renal fxn.    Vital Signs Last 24 Hrs  T(C): 36.4 (30 Mar 2022 00:13), Max: 36.6 (29 Mar 2022 17:21)  T(F): 97.5 (30 Mar 2022 00:13), Max: 97.8 (29 Mar 2022 17:21)  HR: 68 (30 Mar 2022 00:13) (68 - 89)  BP: 114/74 (30 Mar 2022 00:13) (114/74 - 116/86)  BP(mean): --  RR: 18 (30 Mar 2022 00:13) (18 - 18)  SpO2: 99% (30 Mar 2022 00:13) (98% - 99%)    Physical exam as above    Labs and imaging studies noted.    Assessment and plan: Patient s a 30 yo M from [facility], ambulates independently, with PMH of autism w/ moderate intellectual disability, asthma, hypothyroidism, obesity, pre DM, GERD, impulse control disorder, factitious disorder, h/o urinary retention presented with c/o unable to urinate, with urinary retention s/p mckeon catheter, admitted for safe discharge.    Urinary retention  Autism w/ moderate intellectual disability  Asthma  Hypothyroidism  GERD  Iron deficiency anemia- on oral supplement resume.    - recent admission for urinary retentions at [facility] couple of times before, normal prostrate on CT, likely 2/2 behavioural issue vs. medication side effects.  - Mckeon was placed in ED, UO 600cc; UA neg, normal renal fxn.  - Urology consult in am (ED physician spoke to overnight house officer).  - follow renal/bladder US. Depakote level.  - resume tamsulosin and Finasteride.   - patient noted to be started on Benztropine on discharge during last admit, could be possibly contributing to urinary retention. will resume rest of home medications ( on risperidone, divalproex, mirtazapine and buspirone), except Benztropine.  - consider psych consult in am for medication adjustment vs. outpatient follow up.  rest as above

## 2022-03-31 DIAGNOSIS — Z02.9 ENCOUNTER FOR ADMINISTRATIVE EXAMINATIONS, UNSPECIFIED: ICD-10-CM

## 2022-03-31 LAB
ANION GAP SERPL CALC-SCNC: 8 MMOL/L — SIGNIFICANT CHANGE UP (ref 5–17)
BUN SERPL-MCNC: 19 MG/DL — HIGH (ref 7–18)
CALCIUM SERPL-MCNC: 9 MG/DL — SIGNIFICANT CHANGE UP (ref 8.4–10.5)
CHLORIDE SERPL-SCNC: 105 MMOL/L — SIGNIFICANT CHANGE UP (ref 96–108)
CO2 SERPL-SCNC: 26 MMOL/L — SIGNIFICANT CHANGE UP (ref 22–31)
CREAT SERPL-MCNC: 0.91 MG/DL — SIGNIFICANT CHANGE UP (ref 0.5–1.3)
CULTURE RESULTS: SIGNIFICANT CHANGE UP
EGFR: 115 ML/MIN/1.73M2 — SIGNIFICANT CHANGE UP
GLUCOSE BLDC GLUCOMTR-MCNC: 103 MG/DL — HIGH (ref 70–99)
GLUCOSE BLDC GLUCOMTR-MCNC: 106 MG/DL — HIGH (ref 70–99)
GLUCOSE BLDC GLUCOMTR-MCNC: 121 MG/DL — HIGH (ref 70–99)
GLUCOSE BLDC GLUCOMTR-MCNC: 90 MG/DL — SIGNIFICANT CHANGE UP (ref 70–99)
GLUCOSE SERPL-MCNC: 99 MG/DL — SIGNIFICANT CHANGE UP (ref 70–99)
HCT VFR BLD CALC: 43.4 % — SIGNIFICANT CHANGE UP (ref 39–50)
HGB BLD-MCNC: 13.5 G/DL — SIGNIFICANT CHANGE UP (ref 13–17)
MCHC RBC-ENTMCNC: 22.8 PG — LOW (ref 27–34)
MCHC RBC-ENTMCNC: 31.1 GM/DL — LOW (ref 32–36)
MCV RBC AUTO: 73.3 FL — LOW (ref 80–100)
NRBC # BLD: 0 /100 WBCS — SIGNIFICANT CHANGE UP (ref 0–0)
PHOSPHATE SERPL-MCNC: 4.3 MG/DL — SIGNIFICANT CHANGE UP (ref 2.5–4.5)
PLATELET # BLD AUTO: 285 K/UL — SIGNIFICANT CHANGE UP (ref 150–400)
POTASSIUM SERPL-MCNC: 4.4 MMOL/L — SIGNIFICANT CHANGE UP (ref 3.5–5.3)
POTASSIUM SERPL-SCNC: 4.4 MMOL/L — SIGNIFICANT CHANGE UP (ref 3.5–5.3)
RBC # BLD: 5.92 M/UL — HIGH (ref 4.2–5.8)
RBC # FLD: 15.9 % — HIGH (ref 10.3–14.5)
SODIUM SERPL-SCNC: 139 MMOL/L — SIGNIFICANT CHANGE UP (ref 135–145)
SPECIMEN SOURCE: SIGNIFICANT CHANGE UP
WBC # BLD: 8.77 K/UL — SIGNIFICANT CHANGE UP (ref 3.8–10.5)
WBC # FLD AUTO: 8.77 K/UL — SIGNIFICANT CHANGE UP (ref 3.8–10.5)

## 2022-03-31 PROCEDURE — 99233 SBSQ HOSP IP/OBS HIGH 50: CPT

## 2022-03-31 RX ORDER — ACETAMINOPHEN 500 MG
650 TABLET ORAL ONCE
Refills: 0 | Status: COMPLETED | OUTPATIENT
Start: 2022-03-31 | End: 2022-03-31

## 2022-03-31 RX ADMIN — Medication 50 MICROGRAM(S): at 06:11

## 2022-03-31 RX ADMIN — Medication 15 MILLIGRAM(S): at 06:11

## 2022-03-31 RX ADMIN — Medication 650 MILLIGRAM(S): at 23:22

## 2022-03-31 RX ADMIN — FINASTERIDE 5 MILLIGRAM(S): 5 TABLET, FILM COATED ORAL at 12:23

## 2022-03-31 RX ADMIN — RISPERIDONE 2 MILLIGRAM(S): 4 TABLET ORAL at 06:12

## 2022-03-31 RX ADMIN — Medication 15 MILLIGRAM(S): at 17:48

## 2022-03-31 RX ADMIN — RISPERIDONE 2 MILLIGRAM(S): 4 TABLET ORAL at 17:48

## 2022-03-31 RX ADMIN — Medication 1 MILLIGRAM(S): at 15:42

## 2022-03-31 RX ADMIN — MIRTAZAPINE 15 MILLIGRAM(S): 45 TABLET, ORALLY DISINTEGRATING ORAL at 22:38

## 2022-03-31 RX ADMIN — DIVALPROEX SODIUM 500 MILLIGRAM(S): 500 TABLET, DELAYED RELEASE ORAL at 17:48

## 2022-03-31 RX ADMIN — Medication 1 MILLIGRAM(S): at 22:38

## 2022-03-31 RX ADMIN — TAMSULOSIN HYDROCHLORIDE 0.4 MILLIGRAM(S): 0.4 CAPSULE ORAL at 22:38

## 2022-03-31 RX ADMIN — Medication 1 MILLIGRAM(S): at 06:11

## 2022-03-31 RX ADMIN — PANTOPRAZOLE SODIUM 40 MILLIGRAM(S): 20 TABLET, DELAYED RELEASE ORAL at 06:13

## 2022-03-31 RX ADMIN — Medication 325 MILLIGRAM(S): at 12:22

## 2022-03-31 RX ADMIN — ATORVASTATIN CALCIUM 10 MILLIGRAM(S): 80 TABLET, FILM COATED ORAL at 22:38

## 2022-03-31 RX ADMIN — DIVALPROEX SODIUM 500 MILLIGRAM(S): 500 TABLET, DELAYED RELEASE ORAL at 06:11

## 2022-03-31 RX ADMIN — Medication 650 MILLIGRAM(S): at 23:52

## 2022-03-31 NOTE — PROGRESS NOTE ADULT - NS ATTEND AMEND GEN_ALL_CORE FT
32 yo M from [facility] group [place], ambulates independently, with PMH of autism w/ moderate intellectual disability, asthma, hypothyroidism, obesity, pre DM, GERD, impulse control disorder, factitious disorder, h/o urinary retention presented with c/o unable to urinate, with urinary retention s/p mckeon catheter, admitted for safe discharge.  #Urinary retention, likely in the setting of not taking his flomax/proscar and also from medication cogentin which is held  #Autism w/ moderate intellectual disability  #Asthma  #Hypothyroidism  #GERD  #Iron deficiency anemia- on oral supplement resume.  - will continue mckeon for atleast 2 days  - resumed flomax and proscar  - urology consulted, appreciate consult  - Urology consult in am (ED physician spoke to overnight house officer).  - follow renal/bladder US. Depakote level.  - resume tamsulosin and Finasteride.   - dc cogentin as can be causing urinary retention, discussed with jaxon ok to hold on discharge. Can f/u with jaxon outpatient  - patient's group home unable to provide mckeon care, patient will need placement to SNF if fails TOV inpatient

## 2022-03-31 NOTE — CHART NOTE - NSCHARTNOTEFT_GEN_A_CORE
EVENT:     HPI:  31 year old Male, from [facility], PMHx of autism w/ moderate intellectual disability, asthma, hypothyroidism, obesity, GERD, impulse control disorder, factitious disorder, urinary retention. Patient presented to ED with urinary retention. Patient was on Benztropine and non compliant with Flomax. Patient admitted for urinary retention. Urology consulted recommending restarting Flomax and Proscar. Patient Renal US resulting no hydronephrosis. Psychiatry consult on hold will consult if unable to perform necessary nursing care and ADLs. Patient placed on 1:1 constant observation due to flight risk and verbal aggression.      SUBJECTIVE: " I have back pain."     OBJECTIVE:  Vital Signs Last 24 Hrs  T(C): 37.3 (31 Mar 2022 20:15), Max: 37.3 (31 Mar 2022 20:15)  T(F): 99.1 (31 Mar 2022 20:15), Max: 99.1 (31 Mar 2022 20:15)  HR: 80 (31 Mar 2022 22:42) (68 - 92)  BP: 103/69 (31 Mar 2022 20:15) (103/69 - 118/64)  BP(mean): --  RR: 18 (31 Mar 2022 22:42) (17 - 19)  SpO2: 96% (31 Mar 2022 22:42) (94% - 99%)    PHYSICAL EXAM:  Neuro: Awake and alert, oriented to person, place, and time  Cardiovascular: + S1, S2, no murmurs, rubs, or bruits  Respiratory: clear to auscultation bilaterally with good air entry   GI: Abdomen soft, non-tender, bowel sounds present   : Non distended; Sarabia catheter   Skin: warm and dry; no rash      LABS:                        13.5   8.77  )-----------( 285      ( 31 Mar 2022 06:35 )             43.4     03-31    139  |  105  |  19<H>  ----------------------------<  99  4.4   |  26  |  0.91    Ca    9.0      31 Mar 2022 06:35  Phos  4.3     03-31  Mg     1.7     03-30    TPro  6.6  /  Alb  3.1<L>  /  TBili  0.3  /  DBili  x   /  AST  15  /  ALT  27  /  AlkPhos  77  03-30        EKG:   IMAGING:    ASSESSMENT/PROBLEM: Back pain possibly due to decreased mobility while hospitalized     PLAN:     -Tylenol 650 mg PO x 1 dose  -Encourage increased mobility   -Continue current/supportive care    FOLLOW UP / RESULT:       -Monitor effectiveness of pain medication  -Reassess pain per hospital policy

## 2022-03-31 NOTE — PROGRESS NOTE ADULT - ASSESSMENT
Patient is a 31 year old Male, from [facility], PMHx of autism w/ moderate intellectual disability, asthma, hypothyroidism, obesity, GERD, impulse control disorder, factitious disorder, urinary retention. Patient presented to ED with urinary retention. Patient was on Benztropine and non compliant with Flomax. Patient admitted for urinary retention. Urology consulted recommending restarting Flomax and proscar. Patient Renal US resulting no hydronephrosis. Psychiatry consult on hold will consult if unable to perform necessary nursing care and ADLs. Patient placed on 1:1 constant observation due to flight risk and verbal aggression.    
30 yo M,  ambulates independently, from [facility] group home, PMHx of autism w/ moderate intellectual disability, asthma, hypothyroidism, obesity, GERD, impulse control disorder, factitious disorder, urinary retention came with chief complain of unable to urinate for one day. Admitted for urinary retention. Urology consulted and following, Renal US pending.   Group home provided patient's current medication list and of note, patient was no longer on Flomax/ Proscar. Ativan therapy confirmed.  Benztropine held on admission d/t concern might be contributing to urinary retention;  Psych consulted to assist with medication management.

## 2022-03-31 NOTE — PROGRESS NOTE ADULT - PROBLEM SELECTOR PLAN 8
Hgba1c 6.1 on last admission in Jan  Continue carbohydrate consistent diet  ACCU check before meals
Hgba1c 6.1 on last admission in Jan  - Continue  carbohydrate consistent diet  - monitor blood glucose levels; currently w/in acceptable range

## 2022-03-31 NOTE — PROGRESS NOTE ADULT - SUBJECTIVE AND OBJECTIVE BOX
NP Note discussed with  Primary Attending; Dr Funes    Patient is a 31y old  Male who presents with a chief complaint of Urinary retention (30 Mar 2022 01:51)      INTERVAL HPI/OVERNIGHT EVENTS: Patient seen and evaluated earlier today; c/o burning at mckeon insertion site ( no  e/o trauma/ external irritation noted, mckeon to bsd; clear yellow urine, UA negative)     MEDICATIONS  (STANDING):  atorvastatin 10 milliGRAM(s) Oral at bedtime  busPIRone 15 milliGRAM(s) Oral every 12 hours  diVALproex  milliGRAM(s) Oral every 12 hours  ferrous    sulfate 325 milliGRAM(s) Oral daily  finasteride 5 milliGRAM(s) Oral daily  levothyroxine 50 MICROGram(s) Oral daily  LORazepam     Tablet 1 milliGRAM(s) Oral three times a day  mirtazapine 15 milliGRAM(s) Oral at bedtime  pantoprazole    Tablet 40 milliGRAM(s) Oral before breakfast  risperiDONE   Tablet 2 milliGRAM(s) Oral every 12 hours  tamsulosin 0.4 milliGRAM(s) Oral at bedtime    MEDICATIONS  (PRN):  ALBUTerol    90 MICROgram(s) HFA Inhaler 2 Puff(s) Inhalation every 6 hours PRN Shortness of Breath and/or Wheezing      __________________________________________________  REVIEW OF SYSTEMS:    CONSTITUTIONAL: No fever,   RESPIRATORY: No cough; No shortness of breath  CARDIOVASCULAR: No chest pain, no palpitations  GASTROINTESTINAL: No pain. No nausea or vomiting; No diarrhea   NEUROLOGICAL: No headache or numbness, no tremors  MUSCULOSKELETAL: No joint pain, no muscle pain  GENITOURINARY: see interval HPI   PSYCHIATRY: no depression , no anxiety  ALL OTHER  ROS negative        Vital Signs Last 24 Hrs  T(C): 36.6 (30 Mar 2022 05:43), Max: 36.9 (30 Mar 2022 04:00)  T(F): 97.9 (30 Mar 2022 05:43), Max: 98.4 (30 Mar 2022 04:00)  HR: 69 (30 Mar 2022 05:43) (68 - 89)  BP: 131/82 (30 Mar 2022 05:43) (114/74 - 131/82)  BP(mean): --  RR: 17 (30 Mar 2022 05:43) (17 - 18)  SpO2: 97% (30 Mar 2022 05:43) (97% - 99%)    ________________________________________________  PHYSICAL EXAM:  NEURO/ GENERAL: Alert, NAD  HEENT: Normocephalic;  atraumatic, neck  supple  CHEST/LUNG: Breathing nonlabored; Clear to auscultation bilaterally  HEART: +S1 +S2  regular  ABDOMEN: Soft, Nontender, Nondistended; Bowel sounds present  EXTREMITIES: +2 bilateral radial pulses. No cyanosis; no edema  SKIN: warm and dry; no rash    _________________________________________________  LABS:                        12.7   7.25  )-----------( 260      ( 30 Mar 2022 06:39 )             40.5     03-30    139  |  106  |  12  ----------------------------<  96  4.1   |  27  |  0.77    Ca    8.7      30 Mar 2022 06:39  Phos  3.8     03-30  Mg     1.7     03-30    TPro  6.6  /  Alb  3.1<L>  /  TBili  0.3  /  DBili  x   /  AST  15  /  ALT  27  /  AlkPhos  77  03-30      Urinalysis Basic - ( 29 Mar 2022 23:33 )    Color: Yellow / Appearance: Clear / S.010 / pH: x  Gluc: x / Ketone: Negative  / Bili: Negative / Urobili: Negative   Blood: x / Protein: Negative / Nitrite: Negative   Leuk Esterase: Negative / RBC: x / WBC x   Sq Epi: x / Non Sq Epi: x / Bacteria: x      CAPILLARY BLOOD GLUCOSE      POCT Blood Glucose.: 107 mg/dL (30 Mar 2022 07:59)        RADIOLOGY & ADDITIONAL TESTS:  RENAL US PENDING****      
NP Note discussed with  Primary Attending    Patient is a 31y old  Male who presents with a chief complaint of Urinary retention (30 Mar 2022 13:07)      INTERVAL HPI/OVERNIGHT EVENTS: Patient seen and examined at bedside. Patient agitated, verbally aggressive, yelling, screaming, cursing. Patient on 1:1 constant observation for agitation, flight risk.     MEDICATIONS  (STANDING):  atorvastatin 10 milliGRAM(s) Oral at bedtime  busPIRone 15 milliGRAM(s) Oral every 12 hours  diVALproex  milliGRAM(s) Oral every 12 hours  ferrous    sulfate 325 milliGRAM(s) Oral daily  finasteride 5 milliGRAM(s) Oral daily  levothyroxine 50 MICROGram(s) Oral daily  LORazepam     Tablet 1 milliGRAM(s) Oral three times a day  mirtazapine 15 milliGRAM(s) Oral at bedtime  pantoprazole    Tablet 40 milliGRAM(s) Oral before breakfast  risperiDONE   Tablet 2 milliGRAM(s) Oral every 12 hours  tamsulosin 0.4 milliGRAM(s) Oral at bedtime    MEDICATIONS  (PRN):  ALBUTerol    90 MICROgram(s) HFA Inhaler 2 Puff(s) Inhalation every 6 hours PRN Shortness of Breath and/or Wheezing      __________________________________________________  REVIEW OF SYSTEMS:    CONSTITUTIONAL: No fever,   EYES: no acute visual disturbances  NECK: No pain or stiffness  RESPIRATORY: No cough; No shortness of breath  CARDIOVASCULAR: No chest pain, no palpitations  GASTROINTESTINAL: No pain. No nausea or vomiting; No diarrhea   NEUROLOGICAL: No headache or numbness, no tremors  MUSCULOSKELETAL: No joint pain, no muscle pain  GENITOURINARY: no dysuria, no frequency, no hesitancy  PSYCHIATRY: no depression , no anxiety  ALL OTHER  ROS negative        Vital Signs Last 24 Hrs  T(C): 36.8 (31 Mar 2022 13:15), Max: 36.8 (30 Mar 2022 20:31)  T(F): 98.2 (31 Mar 2022 13:15), Max: 98.2 (30 Mar 2022 20:31)  HR: 72 (31 Mar 2022 13:15) (68 - 74)  BP: 115/78 (31 Mar 2022 13:15) (109/61 - 118/64)  BP(mean): --  RR: 17 (31 Mar 2022 13:15) (17 - 19)  SpO2: 95% (31 Mar 2022 13:15) (95% - 99%)    ________________________________________________  PHYSICAL EXAM:  GENERAL: agitation, restlessness, verbally aggressive  HEENT: Normocephalic;  conjunctivae and sclerae clear; moist mucous membranes;   NECK : supple  CHEST/LUNG: Clear to auscultation bilaterally with good air entry   HEART: S1 S2  regular; no murmurs, gallops or rubs  ABDOMEN: Soft, Nontender, Nondistended; Bowel sounds present  EXTREMITIES: no cyanosis; no edema; no calf tenderness  SKIN: warm and dry; no rash  NERVOUS SYSTEM:  Awake and alert; Oriented  to place, person and time, agitation, restlessness, verbally aggressive  : mcekon catheter    _________________________________________________  LABS:                        13.5   8.77  )-----------( 285      ( 31 Mar 2022 06:35 )             43.4     03-31    139  |  105  |  19<H>  ----------------------------<  99  4.4   |  26  |  0.91    Ca    9.0      31 Mar 2022 06:35  Phos  4.3     03-31  Mg     1.7     03-30    TPro  6.6  /  Alb  3.1<L>  /  TBili  0.3  /  DBili  x   /  AST  15  /  ALT  27  /  AlkPhos  77  03-30      Urinalysis Basic - ( 29 Mar 2022 23:33 )    Color: Yellow / Appearance: Clear / S.010 / pH: x  Gluc: x / Ketone: Negative  / Bili: Negative / Urobili: Negative   Blood: x / Protein: Negative / Nitrite: Negative   Leuk Esterase: Negative / RBC: x / WBC x   Sq Epi: x / Non Sq Epi: x / Bacteria: x      CAPILLARY BLOOD GLUCOSE      POCT Blood Glucose.: 103 mg/dL (31 Mar 2022 11:28)  POCT Blood Glucose.: 90 mg/dL (31 Mar 2022 08:09)  POCT Blood Glucose.: 96 mg/dL (30 Mar 2022 21:35)  POCT Blood Glucose.: 107 mg/dL (30 Mar 2022 16:57)        RADIOLOGY & ADDITIONAL TESTS:  < from: US Renal (22 @ 11:46) >  FINDINGS:    Technically limited exam due to bowel gas and lack of patient cooperation.    Right kidney: 9.6 cm. No hydronephrosis or shadowing stone.    Left kidney: 10.9 cm. No hydronephrosis or shadowing stone.    Urinarybladder: Collapsed.    IMPRESSION:  Limited exam.  No hydronephrosis or shadowing stone.        --- End of Report ---    < end of copied text >    Imaging  Reviewed:  YES    Consultant(s) Notes Reviewed:   YES      Plan of care was discussed with patient and /or primary care giver; all questions and concerns were addressed

## 2022-03-31 NOTE — PROGRESS NOTE ADULT - PROBLEM SELECTOR PLAN 3
-Schizoaffective disorder   -Pt is on risperidone, divalproex, mirtazapine and buspirone   -On benztropine for ?EPS , not documented why he is on it in his previous admission charts  -Benztropine on hold for now as may cause  urinary retention   -Resume rest of his antipsychotic medications  with psych follow up   -follow up Depakote levels  -Psych to consult
home medication risperidone, divalproex, mirtazapine and buspirone   Hold benztropine   resume rest of his antipsychotic medications  with psych follow up

## 2022-03-31 NOTE — PROGRESS NOTE ADULT - PROBLEM SELECTOR PLAN 1
see Renal US as above  continue mckeon, attempt TOV after 2-3 days of Flomax, first dose on 3/3  Urology consulted
-Sarabia was placed in ED 3/29   -follow up  renal US to r/o hydronephrosis   -Was not currently on  tamsulosin and finasteride ( although was previously on) per group home not currently apart of most recent medication regimen  -Continue Flomax and proscar for now   - Urology following

## 2022-03-31 NOTE — CHART NOTE - NSCHARTNOTEFT_GEN_A_CORE
Sarabia catheter placed with 600 cc of output. No concern for post obstructive diuresis. Renal/bladder US reviewed. No hydronephrosis.     TOV can be done at the discretion of the primary team.     To optimize TOV:   -Flomax 0.4mg qHS   -Finasteride 5mg qDay  -Bowel regimen, senna, colace, miralax  -Hydration  -Keep Sarabia until patient has gotten at least 2 days of Flomax  -May TOV after 2 days of Flomax if ambulating and having soft BMs.  -If fails TOV, replace Sarabia, home with Sarabia, TOV as outpatient  -Follow up with Dr. Lutz in clinic Sarabia catheter placed with 600 cc of output. No concern for post obstructive diuresis. Renal/bladder US reviewed. No hydronephrosis.     TOV can be done at the discretion of the primary team.     To optimize TOV:   -Flomax 0.4mg qHS   -Finasteride 5mg qDay  -Bowel regimen, senna, colace, miralax  -Hydration  -Keep Sarabia until patient has gotten at least 2 days of Flomax  -May TOV after 2 days of Flomax if ambulating and having soft BMs.  -If fails TOV, replace Sarabia, home with Sarabia, TOV as outpatient  -Follow up with Dr. Lutz in clinic    *Urinary retention protocol can be found by searching "male retention" into the search bar on [employer].

## 2022-03-31 NOTE — PROGRESS NOTE ADULT - PROBLEM SELECTOR PLAN 2
Supportive treatment   continue 1:1 constant observation
Supportive treatment   Will start 1:1 constant observation If pt starts get agitated  - Psychiatry, Dr Dumas, consulted.

## 2022-03-31 NOTE — ED ADULT NURSE NOTE - BREATH SOUNDS, MLM
Clear Azithromycin Pregnancy And Lactation Text: This medication is considered safe during pregnancy and is also secreted in breast milk.

## 2022-04-01 ENCOUNTER — TRANSCRIPTION ENCOUNTER (OUTPATIENT)
Age: 32
End: 2022-04-01

## 2022-04-01 VITALS
RESPIRATION RATE: 19 BRPM | SYSTOLIC BLOOD PRESSURE: 130 MMHG | HEART RATE: 100 BPM | OXYGEN SATURATION: 96 % | DIASTOLIC BLOOD PRESSURE: 82 MMHG | TEMPERATURE: 98 F

## 2022-04-01 LAB
ANION GAP SERPL CALC-SCNC: 6 MMOL/L — SIGNIFICANT CHANGE UP (ref 5–17)
BUN SERPL-MCNC: 23 MG/DL — HIGH (ref 7–18)
CALCIUM SERPL-MCNC: 8.6 MG/DL — SIGNIFICANT CHANGE UP (ref 8.4–10.5)
CHLORIDE SERPL-SCNC: 104 MMOL/L — SIGNIFICANT CHANGE UP (ref 96–108)
CO2 SERPL-SCNC: 29 MMOL/L — SIGNIFICANT CHANGE UP (ref 22–31)
CREAT SERPL-MCNC: 0.95 MG/DL — SIGNIFICANT CHANGE UP (ref 0.5–1.3)
EGFR: 109 ML/MIN/1.73M2 — SIGNIFICANT CHANGE UP
GLUCOSE BLDC GLUCOMTR-MCNC: 85 MG/DL — SIGNIFICANT CHANGE UP (ref 70–99)
GLUCOSE BLDC GLUCOMTR-MCNC: 88 MG/DL — SIGNIFICANT CHANGE UP (ref 70–99)
GLUCOSE SERPL-MCNC: 88 MG/DL — SIGNIFICANT CHANGE UP (ref 70–99)
HCT VFR BLD CALC: 43.4 % — SIGNIFICANT CHANGE UP (ref 39–50)
HGB BLD-MCNC: 13.7 G/DL — SIGNIFICANT CHANGE UP (ref 13–17)
MCHC RBC-ENTMCNC: 23.1 PG — LOW (ref 27–34)
MCHC RBC-ENTMCNC: 31.6 GM/DL — LOW (ref 32–36)
MCV RBC AUTO: 73.3 FL — LOW (ref 80–100)
NRBC # BLD: 0 /100 WBCS — SIGNIFICANT CHANGE UP (ref 0–0)
PLATELET # BLD AUTO: 246 K/UL — SIGNIFICANT CHANGE UP (ref 150–400)
POTASSIUM SERPL-MCNC: 4.2 MMOL/L — SIGNIFICANT CHANGE UP (ref 3.5–5.3)
POTASSIUM SERPL-SCNC: 4.2 MMOL/L — SIGNIFICANT CHANGE UP (ref 3.5–5.3)
RBC # BLD: 5.92 M/UL — HIGH (ref 4.2–5.8)
RBC # FLD: 16.3 % — HIGH (ref 10.3–14.5)
SARS-COV-2 RNA SPEC QL NAA+PROBE: SIGNIFICANT CHANGE UP
SODIUM SERPL-SCNC: 139 MMOL/L — SIGNIFICANT CHANGE UP (ref 135–145)
WBC # BLD: 8.24 K/UL — SIGNIFICANT CHANGE UP (ref 3.8–10.5)
WBC # FLD AUTO: 8.24 K/UL — SIGNIFICANT CHANGE UP (ref 3.8–10.5)

## 2022-04-01 PROCEDURE — 99285 EMERGENCY DEPT VISIT HI MDM: CPT | Mod: 25

## 2022-04-01 PROCEDURE — 83690 ASSAY OF LIPASE: CPT

## 2022-04-01 PROCEDURE — 82962 GLUCOSE BLOOD TEST: CPT

## 2022-04-01 PROCEDURE — 81003 URINALYSIS AUTO W/O SCOPE: CPT

## 2022-04-01 PROCEDURE — 80164 ASSAY DIPROPYLACETIC ACD TOT: CPT

## 2022-04-01 PROCEDURE — 87086 URINE CULTURE/COLONY COUNT: CPT

## 2022-04-01 PROCEDURE — 80053 COMPREHEN METABOLIC PANEL: CPT

## 2022-04-01 PROCEDURE — 99239 HOSP IP/OBS DSCHRG MGMT >30: CPT

## 2022-04-01 PROCEDURE — 83735 ASSAY OF MAGNESIUM: CPT

## 2022-04-01 PROCEDURE — 87635 SARS-COV-2 COVID-19 AMP PRB: CPT

## 2022-04-01 PROCEDURE — 85025 COMPLETE CBC W/AUTO DIFF WBC: CPT

## 2022-04-01 PROCEDURE — 93005 ELECTROCARDIOGRAM TRACING: CPT

## 2022-04-01 PROCEDURE — 76775 US EXAM ABDO BACK WALL LIM: CPT

## 2022-04-01 PROCEDURE — 84100 ASSAY OF PHOSPHORUS: CPT

## 2022-04-01 PROCEDURE — 85027 COMPLETE CBC AUTOMATED: CPT

## 2022-04-01 PROCEDURE — 99283 EMERGENCY DEPT VISIT LOW MDM: CPT

## 2022-04-01 PROCEDURE — 80048 BASIC METABOLIC PNL TOTAL CA: CPT

## 2022-04-01 PROCEDURE — 36415 COLL VENOUS BLD VENIPUNCTURE: CPT

## 2022-04-01 RX ORDER — POLYETHYLENE GLYCOL 3350 17 G/17G
17 POWDER, FOR SOLUTION ORAL
Qty: 119 | Refills: 0
Start: 2022-04-01 | End: 2022-04-07

## 2022-04-01 RX ORDER — FERROUS SULFATE 325(65) MG
1 TABLET ORAL
Qty: 30 | Refills: 0
Start: 2022-04-01 | End: 2022-04-30

## 2022-04-01 RX ORDER — SENNA PLUS 8.6 MG/1
2 TABLET ORAL
Qty: 60 | Refills: 0
Start: 2022-04-01 | End: 2022-04-30

## 2022-04-01 RX ORDER — FINASTERIDE 5 MG/1
1 TABLET, FILM COATED ORAL
Qty: 30 | Refills: 0
Start: 2022-04-01 | End: 2022-04-30

## 2022-04-01 RX ORDER — BENZTROPINE MESYLATE 1 MG
1 TABLET ORAL
Qty: 0 | Refills: 0 | DISCHARGE

## 2022-04-01 RX ORDER — RISPERIDONE 4 MG/1
1 TABLET ORAL
Qty: 0 | Refills: 0 | DISCHARGE

## 2022-04-01 RX ORDER — ALBUTEROL 90 UG/1
2 AEROSOL, METERED ORAL
Qty: 0 | Refills: 0 | DISCHARGE
Start: 2022-04-01

## 2022-04-01 RX ORDER — RISPERIDONE 4 MG/1
1 TABLET ORAL
Qty: 0 | Refills: 0 | DISCHARGE
Start: 2022-04-01

## 2022-04-01 RX ORDER — TAMSULOSIN HYDROCHLORIDE 0.4 MG/1
1 CAPSULE ORAL
Qty: 30 | Refills: 0
Start: 2022-04-01 | End: 2022-04-30

## 2022-04-01 RX ADMIN — Medication 325 MILLIGRAM(S): at 11:28

## 2022-04-01 RX ADMIN — DIVALPROEX SODIUM 500 MILLIGRAM(S): 500 TABLET, DELAYED RELEASE ORAL at 06:11

## 2022-04-01 RX ADMIN — Medication 15 MILLIGRAM(S): at 06:11

## 2022-04-01 RX ADMIN — PANTOPRAZOLE SODIUM 40 MILLIGRAM(S): 20 TABLET, DELAYED RELEASE ORAL at 06:10

## 2022-04-01 RX ADMIN — RISPERIDONE 2 MILLIGRAM(S): 4 TABLET ORAL at 06:11

## 2022-04-01 RX ADMIN — FINASTERIDE 5 MILLIGRAM(S): 5 TABLET, FILM COATED ORAL at 11:29

## 2022-04-01 RX ADMIN — Medication 1 MILLIGRAM(S): at 06:11

## 2022-04-01 RX ADMIN — Medication 1 MILLIGRAM(S): at 14:18

## 2022-04-01 RX ADMIN — Medication 50 MICROGRAM(S): at 06:11

## 2022-04-01 NOTE — DISCHARGE NOTE NURSING/CASE MANAGEMENT/SOCIAL WORK - PATIENT PORTAL LINK FT
You can access the FollowMyHealth Patient Portal offered by [facility] by registering at the following website: [email or web address]. By joining [facility]’s FollowMyHealth portal, you will also be able to view your health information using other applications (apps) compatible with our system.

## 2022-04-01 NOTE — DISCHARGE NOTE PROVIDER - NSDCCPCAREPLAN_GEN_ALL_CORE_FT
PRINCIPAL DISCHARGE DIAGNOSIS  Diagnosis: Urinary retention  Assessment and Plan of Treatment: You came to the hospital because you were not able to urinate.  A mckeon was placed to decompress your bladder while medicaitons were started to relax your prostate.  The mckeon was removed and you now able to void without complication.      SECONDARY DISCHARGE DIAGNOSES  Diagnosis: Asthma  Assessment and Plan of Treatment: Continue to take your medication as prescribed.  Follow up with your primary doctor regularly to ensure your medication is therapeutic.    Diagnosis: GERD (gastroesophageal reflux disease)  Assessment and Plan of Treatment: Continue to take your medication as prescribed.  Follow up with your primary doctor regularly to ensure your medication is therapeutic.    Diagnosis: HLD (hyperlipidemia)  Assessment and Plan of Treatment: Continue to take your medication as prescribed.  Follow up with your primary doctor regularly to ensure your medication is therapeutic.    Diagnosis: Hypothyroidism  Assessment and Plan of Treatment: Continue to take your medication as prescribed.  Follow up with your primary doctor regularly to ensure your medication is therapeutic.    Diagnosis: Intellectual disability  Assessment and Plan of Treatment: Continue to take your medication as prescribed.  Follow up with your primary doctor regularly to ensure your medication is therapeutic.    Diagnosis: PRANAY (iron deficiency anemia)  Assessment and Plan of Treatment: Continue to take your medication as prescribed.  Follow up with your primary doctor regularly to ensure your medication is therapeutic.    Diagnosis: Mood disorder  Assessment and Plan of Treatment: Continue to take your medication as prescribed.  Follow up with your primary doctor regularly to ensure your medication is therapeutic.

## 2022-04-01 NOTE — DISCHARGE NOTE PROVIDER - HOSPITAL COURSE
Patient is a 31 year old Male, from [facility], PMHx of autism w/ moderate intellectual disability, asthma, hypothyroidism, obesity, GERD, impulse control disorder, factitious disorder, urinary retention. Patient presented to ED with urinary retention. Patient was on Benztropine and non compliant with Flomax. Patient admitted for urinary retention. Urology consulted recommending restarting Flomax and proscar. Patient Renal US resulting no hydronephrosis. Psychiatry consult on hold will consult if unable to perform necessary nursing care and ADLs. Patient placed on 1:1 constant observation due to flight risk and verbal aggression.  Mckeon inserted, Flomax started, keep mckeon until at least 2 days of Flomax.  Mckeon removed, constant observation discontinued.  Patient has voided multiple times PVR 2 mL.    Case discussed with attending.  Given patient's improved clinical status and current hemodynamic stability, the decision was made for discharge.    This is a summary of the patients hospitalization.  For full hospital course, please see medical record.   Patient is a 31 year old Male, from [facility], PMHx of autism w/ moderate intellectual disability, asthma, hypothyroidism, obesity, GERD, impulse control disorder, factitious disorder, urinary retention. Patient presented to ED with urinary retention. Patient was on Benztropine and non compliant with Flomax. Patient admitted for urinary retention. Urology consulted recommending restarting Flomax and proscar. Patient Renal US resulting no hydronephrosis. Psychiatry consult on hold will consult if unable to perform necessary nursing care and ADLs. Patient placed on 1:1 constant observation due to flight risk and verbal aggression.  Mckeon inserted, Flomax started, keep mckeon until at least 2 days of Flomax.  Mckeon removed, constant observation discontinued.  Patient has voided multiple times PVR 2 mL.    Case discussed with attending.  Given patient's improved clinical status and current hemodynamic stability, the decision was made for discharge.    This is a summary of the patients hospitalization.  For full hospital course, please see medical record.      Addendum: urinary retention likely 2/2 behavioural etiology, vs,. possible medication side effects; no BPH.

## 2022-04-01 NOTE — DISCHARGE NOTE NURSING/CASE MANAGEMENT/SOCIAL WORK - NSDCVIVACCINE_GEN_ALL_CORE_FT
Tdap; 20-Dec-2018 12:21; Lo Anaya (RN); [employer]; [id number] (Exp. Date: 02-Nov-2020); IntraMuscular; Deltoid Left.; 0.5 milliLiter(s); VIS (VIS Published: 09-May-2013, VIS Presented: 20-Dec-2018);   Tdap; 26-Mar-2019 18:59; Shavon Barrios (RN); [employer]; [id number] (Exp. Date: 26-Feb-2021); IntraMuscular; Deltoid Left.; 0.5 milliLiter(s); VIS (VIS Published: 09-May-2013, VIS Presented: 26-Mar-2019);   Tdap; 01-Dec-2021 09:35; Zamzam Coulter (RN); [employer]; [id number] (Exp. Date: 09-Sep-2023); IntraMuscular; Deltoid Left.; 0.5 milliLiter(s); VIS (VIS Published: 09-May-2013, VIS Presented: 01-Dec-2021);

## 2022-04-01 NOTE — DISCHARGE NOTE PROVIDER - CARE PROVIDER_API CALL
Gus Birmingham (MD)  Family Medicine  [address] [place] [address]  Phone: [phone]  Fax: [phone]  Follow Up Time: 1-3 days

## 2022-04-01 NOTE — DISCHARGE NOTE PROVIDER - ATTENDING DISCHARGE PHYSICAL EXAMINATION:
32 yo M from [facility], ambulates independently, with PMH of autism w/ moderate intellectual disability, asthma, hypothyroidism, obesity, pre DM, GERD, impulse control disorder, factitious disorder, h/o urinary retention presented with c/o unable to urinate, with urinary retention s/p mckeon catheter, admitted for safe discharge.  #Urinary retention, likely in the setting of not taking his flomax/proscar and also from medication cogentin    #Autism w/ moderate intellectual disability  #Asthma  #Hypothyroidism  #GERD  #Iron deficiency anemia- on oral supplement resume.  - patient passed TOV, voiding well without mckeon catheter  - continue flomax and proscar  - urology consulted, appreciate consult  - dc cogentin as can be causing urinary retention, discussed with jaxon ok to hold on discharge. Can f/u with jaxon outpatient     Physical exam: patient NAD, no respiratory distress, abd soft nontender, no c/c/e

## 2022-04-01 NOTE — DISCHARGE NOTE PROVIDER - NSDCMRMEDTOKEN_GEN_ALL_CORE_FT
albuterol 90 mcg/inh inhalation aerosol: 2 puff(s) inhaled every 6 hours, As needed, Shortness of Breath and/or Wheezing  busPIRone 15 mg oral tablet: 1 tab(s) orally every 12 hours  divalproex sodium 500 mg oral delayed release tablet: 1 tab(s) orally 2 times a day  ferrous sulfate 325 mg (65 mg elemental iron) oral tablet: 1 tab(s) orally once a day  finasteride 5 mg oral tablet: 1 tab(s) orally once a day  levothyroxine 50 mcg (0.05 mg) oral tablet: 1 tab(s) orally once a day in AM  LORazepam 1 mg oral tablet: 1 tab(s) orally 3 times a day  MiraLax oral powder for reconstitution: 17 gram(s) orally once a day, As Needed -for congestion - for constipation   mirtazapine 15 mg oral tablet: 1 tab(s) orally once a day (at bedtime)  pantoprazole 20 mg oral delayed release tablet: 1 tab(s) orally 2 times a day  pravastatin 20 mg oral tablet: 1 tab(s) orally once a day  risperiDONE 2 mg oral tablet: 1 tab(s) orally every 12 hours  senna oral tablet: 2 tab(s) orally once a day (at bedtime)   tamsulosin 0.4 mg oral capsule: 1 cap(s) orally once a day (at bedtime)

## 2022-04-13 ENCOUNTER — EMERGENCY (EMERGENCY)
Facility: HOSPITAL | Age: 32
LOS: 1 days | Discharge: HOME | End: 2022-04-13
Attending: STUDENT IN AN ORGANIZED HEALTH CARE EDUCATION/TRAINING PROGRAM
Payer: MEDICAID

## 2022-04-13 VITALS
TEMPERATURE: 99 F | RESPIRATION RATE: 16 BRPM | DIASTOLIC BLOOD PRESSURE: 79 MMHG | OXYGEN SATURATION: 96 % | HEART RATE: 84 BPM | WEIGHT: 238.98 LBS | SYSTOLIC BLOOD PRESSURE: 113 MMHG | HEIGHT: 71 IN

## 2022-04-13 PROCEDURE — 99284 EMERGENCY DEPT VISIT MOD MDM: CPT

## 2022-04-13 RX ORDER — LOPERAMIDE HCL 2 MG
2 TABLET ORAL ONCE
Refills: 0 | Status: COMPLETED | OUTPATIENT
Start: 2022-04-13 | End: 2022-04-13

## 2022-04-14 PROCEDURE — 99282 EMERGENCY DEPT VISIT SF MDM: CPT

## 2022-04-14 RX ORDER — LOPERAMIDE HCL 2 MG
1 TABLET ORAL
Qty: 10 | Refills: 0
Start: 2022-04-14 | End: 2022-04-18

## 2022-04-14 RX ADMIN — Medication 2 MILLIGRAM(S): at 00:21

## 2022-04-14 NOTE — ED PROVIDER NOTE - NSFOLLOWUPINSTRUCTIONS_ED_ALL_ED_FT
You were seen in the emergency room today for diarrhea. Please call your primary doctor to inform them of this ER visit and obtain the next available appointment within the next 5 days. As we discussed, return to the ER if you have any worsening symptoms.    We no longer feel that you need further emergency care or admission to the hospital at this time.    While we have determined that you are currently stable for discharge, we know that things can change. Please seek immediate medical attention or return to the ER if you experience any of the following:  Any worsening or persistent symptoms  Severe Pain  Chest Pain  Difficulty Breathing  Bleeding  Passing Out  Severe Rash  Inability to Eat or Drink  Persistent Fever    Please see a primary care doctor or specialist within 5 days to ensure that you are improving.    Please call the [facility] phone numbers on this document if you have any problems obtaining a follow up appointment.    I wish you well! -Dr Georges

## 2022-04-14 NOTE — CHART NOTE - NSCHARTNOTEFT_GEN_A_CORE
Late Entry  Verbal referral received, Pt requesting to speak with martha.   Pt is a 31 year old  male who was brought to the ED from [facility] with the complaint of abdominal pain, diarrhea x2days. Pt was visited at bedside re request, he appeared alert and oriented x3. Martha introduced self, role and purpose of visit explained. Pt reports intermittent diarrhea  for couple of days. Emotional support provided. Pt was encouraged to continue to make needs known appropriately and to continue to comply with treatment plan goals. Pt voiced understanding. D/c disposition unknown at this time and Pt reports that he will contact his respite, Emily from the [facility] home to pick him up when medically cleared/ ready for d/c.

## 2022-04-14 NOTE — ED PROVIDER NOTE - CLINICAL SUMMARY MEDICAL DECISION MAKING FREE TEXT BOX
Pt p/w 1 day of diarrhea and no other associated symptoms with a completely benign exam. Giving immodium and GI ref as pt states he has long hx of GI issues. Most likely non emergent etiology of symptoms- the details of the case, history, and exam make more emergent diagnoses much less likely. Discussed with pt my clinical impression and results, patient given strict return precautions if persistent or worsening of symptoms occurs, and need for close follow up. Pt expressed understanding and agrees with plan. Pt is well appearing with a reassuring exam. Discharge home with PMD or Specialist f/u within 3 days.

## 2022-04-14 NOTE — ED ADULT NURSE NOTE - NSIMPLEMENTINTERV_GEN_ALL_ED
Implemented All Universal Safety Interventions:  [place] to call system. Call bell, personal items and telephone within reach. Instruct patient to call for assistance. Room bathroom lighting operational. Non-slip footwear when patient is off stretcher. Physically safe environment: no spills, clutter or unnecessary equipment. Stretcher in lowest position, wheels locked, appropriate side rails in place.

## 2022-04-14 NOTE — ED PROVIDER NOTE - PATIENT PORTAL LINK FT
You can access the FollowMyHealth Patient Portal offered by [facility] by registering at the following website: [email or web address]. By joining [employer]’s FollowMyHealth portal, you will also be able to view your health information using other applications (apps) compatible with our system.

## 2022-04-14 NOTE — ED PROVIDER NOTE - OBJECTIVE STATEMENT
32 y/o male with history of anxiety/depression, autism, asthma, p/w 2 days of diarrhea with no abdominal pain or any other associated symptoms. Patient states that he has had intermittent diarrhea for a long period of time. Patient denies any fever, blood in stool, vomiting, chest pain, shortness of breath, or any other recent illnesses and hospitalizations.

## 2022-04-14 NOTE — ED PROVIDER NOTE - NSFOLLOWUPCLINICS_GEN_ALL_ED_FT
[facility]  Gastroenterology  [address] [place] [address]  Phone: [phone]  Fax: [phone]  Follow Up Time: 1-3 Days

## 2022-04-18 ENCOUNTER — EMERGENCY (EMERGENCY)
Facility: HOSPITAL | Age: 32
LOS: 1 days | Discharge: HOME | End: 2022-04-18
Attending: EMERGENCY MEDICINE
Payer: MEDICAID

## 2022-04-18 ENCOUNTER — TRANSCRIPTION ENCOUNTER (OUTPATIENT)
Age: 32
End: 2022-04-18

## 2022-04-18 VITALS
DIASTOLIC BLOOD PRESSURE: 84 MMHG | RESPIRATION RATE: 19 BRPM | HEIGHT: 71 IN | OXYGEN SATURATION: 98 % | WEIGHT: 276.02 LBS | TEMPERATURE: 98 F | HEART RATE: 97 BPM | SYSTOLIC BLOOD PRESSURE: 132 MMHG

## 2022-04-18 PROCEDURE — 73610 X-RAY EXAM OF ANKLE: CPT

## 2022-04-18 PROCEDURE — 99283 EMERGENCY DEPT VISIT LOW MDM: CPT | Mod: 25

## 2022-04-18 PROCEDURE — 73610 X-RAY EXAM OF ANKLE: CPT | Mod: 26,RT

## 2022-04-18 PROCEDURE — 99283 EMERGENCY DEPT VISIT LOW MDM: CPT

## 2022-04-18 NOTE — ED ADULT TRIAGE NOTE - BP NONINVASIVE DIASTOLIC (MM HG)
cont to monitor Pt is asymptomatic, K: 5.4,  repeats stable. Pt is asymptomatic, K: 5.4,  repeats stable. atorvastatin 40 mg Pt is asymptomatic, K: 5.4,  repeats stable. Pt is asymptomatic, K: 5.4,  repeats stable. Pt is asymptomatic, K: 5.4,  repeats stable. Pt is asymptomatic, K: 5.4,  repeats stable. atorvastatin 40 mg 84 Pt is asymptomatic, K: 5.4,  repeats stable.

## 2022-04-18 NOTE — ED PROVIDER NOTE - NSFOLLOWUPINSTRUCTIONS_ED_ALL_ED_FT
Follow up with the primary care doctor in 3-5 days.  If you experience any new or worsening symptoms or if you are concerned you can always come back to the emergency for a re-evaluation.

## 2022-04-18 NOTE — ED PROVIDER NOTE - OBJECTIVE STATEMENT
31 year old male patient from [facility] with past medical history of autism w/ moderate intellectual disability, asthma, hypothyroidism, obesity, GERD, impulse control disorder, factitious disorder, and urinary retention sent from urgent care for x-ray. Patient s/p fall from playing softball yesterday in [place]. Patient reported that he twisted his right ankle outward; pt states he heard a "popping sound",  now limping and pain with bearing weight. Pain is reproducible with palpation; pain does not radiate. No relief with Ibuprofen 800 mg and Tylenol. Denies any fever, chest pain, SOB, 31 year old male patient from [facility] with past medical history of autism w/ moderate intellectual disability, asthma, hypothyroidism, obesity, GERD, impulse control disorder, factitious disorder, and urinary retention sent from urgent care for x-ray. Patient s/p fall from playing softball yesterday in [place]. Patient reported that he twisted his right ankle outward; pt states he heard a "popping sound",  now limping and pain with bearing weight. Pain is reproducible with palpation; pain does not radiate. Also reports R calf discomfort. No knee pain or any other complaints. No relief with Ibuprofen 800 mg and Tylenol. Denies any fever, chest pain, SOB, or any other complaints.

## 2022-04-18 NOTE — ED PROVIDER NOTE - NS ED ATTENDING STATEMENT MOD
This was a shared visit with the MASHA. I reviewed and verified the documentation and independently performed the documented:

## 2022-04-18 NOTE — ED PROVIDER NOTE - PATIENT PORTAL LINK FT
You can access the FollowMyHealth Patient Portal offered by [facility] by registering at the following website: [email or web address]. By joining [employer]’s FollowMyHealth portal, you will also be able to view your health information using other applications (apps) compatible with our system.

## 2022-04-18 NOTE — ED PROVIDER NOTE - CLINICAL SUMMARY MEDICAL DECISION MAKING FREE TEXT BOX
31 year old male presents to the ED c/o right ankle pain s/p fall while playing softball. Based on exam patient with right lateral malleolus tenderness. 31 year old male presents to the ED c/o right ankle pain s/p fall while playing softball. Well-appearing, ambulatory, neurovascularly intact. Plan: XR, re-assess. Low clinical suspicion for Achilles rupture, compartment syndrome or Maisonneuve fracture.

## 2022-04-18 NOTE — ED PROVIDER NOTE - PHYSICAL EXAMINATION
R leg: Calf compartments soft and non-tender. Achilles grossly intact and non-tender. DP/PT pulses intact 2+. Cap. refill < 2 sec. R knee full range of motion without swelling or tenderness to palpation.

## 2022-04-18 NOTE — ED PROVIDER NOTE - PROGRESS NOTE DETAILS
XR shows no fracture. Patient ambulatory. ACE and velcro ankle splint applied. Will dc with ELVER assistant. Pt is well appearing walking with steady gait, stable for discharge and follow up without fail with medical doctor. I had a detailed discussion with the patient and/or guardian regarding the historical points, exam findings, and any diagnostic results supporting the discharge diagnosis. Pt educated on care and need for follow up. Strict return instructions and red flag signs and symptoms discussed with patient. Questions answered. Pt shows understanding of discharge information and agrees to follow.

## 2022-04-22 ENCOUNTER — EMERGENCY (EMERGENCY)
Facility: HOSPITAL | Age: 32
LOS: 1 days | Discharge: HOME | End: 2022-04-22
Attending: EMERGENCY MEDICINE
Payer: MEDICAID

## 2022-04-22 VITALS
DIASTOLIC BLOOD PRESSURE: 76 MMHG | WEIGHT: 270.07 LBS | RESPIRATION RATE: 17 BRPM | HEART RATE: 110 BPM | HEIGHT: 71 IN | SYSTOLIC BLOOD PRESSURE: 106 MMHG | OXYGEN SATURATION: 97 % | TEMPERATURE: 99 F

## 2022-04-22 VITALS
SYSTOLIC BLOOD PRESSURE: 118 MMHG | TEMPERATURE: 98 F | RESPIRATION RATE: 18 BRPM | HEART RATE: 86 BPM | DIASTOLIC BLOOD PRESSURE: 74 MMHG | OXYGEN SATURATION: 98 %

## 2022-04-22 PROCEDURE — 73140 X-RAY EXAM OF FINGER(S): CPT | Mod: 26,RT

## 2022-04-22 PROCEDURE — 99284 EMERGENCY DEPT VISIT MOD MDM: CPT | Mod: 25

## 2022-04-22 PROCEDURE — 99284 EMERGENCY DEPT VISIT MOD MDM: CPT

## 2022-04-22 PROCEDURE — 73140 X-RAY EXAM OF FINGER(S): CPT

## 2022-04-22 PROCEDURE — 73030 X-RAY EXAM OF SHOULDER: CPT

## 2022-04-22 PROCEDURE — 73030 X-RAY EXAM OF SHOULDER: CPT | Mod: 26,LT

## 2022-04-22 RX ORDER — IBUPROFEN 200 MG
600 TABLET ORAL ONCE
Refills: 0 | Status: COMPLETED | OUTPATIENT
Start: 2022-04-22 | End: 2022-04-22

## 2022-04-22 RX ADMIN — Medication 600 MILLIGRAM(S): at 20:08

## 2022-04-22 RX ADMIN — Medication 600 MILLIGRAM(S): at 19:38

## 2022-04-22 NOTE — ED PROVIDER NOTE - CLINICAL SUMMARY MEDICAL DECISION MAKING FREE TEXT BOX
32 y/o man, h/o developmental delay and lives at group home, says he punched a wall and has pain to left shoulder and right thumb from that incident.  No other injuries--X-rays of left shoulder and right thumb with no Fx/dislocation.

## 2022-04-22 NOTE — ED PROVIDER NOTE - PATIENT PORTAL LINK FT
You can access the FollowMyHealth Patient Portal offered by [facility] by registering at the following website: [email or web address]. By joining [employer]’s FollowMyHealth portal, you will also be able to view your health information using other applications (apps) compatible with our system.

## 2022-04-22 NOTE — PATIENT PROFILE ADULT - HAS THE PATIENT USED TOBACCO IN THE PAST 30 DAYS?
No. MALVIN screening performed.  STOP BANG Legend: 0-2 = LOW Risk; 3-4 = INTERMEDIATE Risk; 5-8 = HIGH Risk No

## 2022-04-22 NOTE — ED PROVIDER NOTE - NSFOLLOWUPINSTRUCTIONS_ED_ALL_ED_FT
Thumb Sprain    A hand showing a thumb ligament and a sprain of the thumb.   A thumb sprain is an injury to one of the bands of tissue that connect bones to each other (a ligament) in your thumb. The ligament may be stretched too much, or it may be torn. A tear can be either partial or complete. How bad, or severe, the sprain is depends on how much of the ligament was damaged or torn.      What are the causes?    A thumb sprain is often caused by a fall or an accident, such as when you hold your hands out to catch something or to protect yourself.      What increases the risk?    This injury is more likely to occur in people who play sports that involve:  •A risk of falling, such as skiing.      •Catching an object, such as basketball.        What are the signs or symptoms?    Symptoms of this condition include:  •Not being able to move the thumb normally.      •Swelling.      •Tenderness.      •Bruising.        How is this diagnosed?    This condition may be diagnosed based on:  •Your symptoms and medical history. Your health care provider may ask about any recent injuries to your thumb.      • A physical exam.       •Imaging studies, such as X-rays, ultrasound, or MRI.        How is this treated?    Treatment for this condition depends on how severe your sprain is.  •If your ligament is overstretched or partially torn, treatment usually involves keeping your thumb in a fixed position (immobilization) for at least 4 to 6 weeks. Your health care provider will apply a bandage (dressing), splint, brace, or cast to keep your thumb from moving until it heals.      •If your ligament is fully torn, you may need surgery to reconnect the ligament to the bone. After surgery, you will need to wear a cast or splint on your thumb.      Your health care provider may also recommend physical therapy to strengthen your thumb.      Follow these instructions at home:    If you have a removable splint, bandage, or brace:     •Wear the splint, bandage, or brace as told by your health care provider. Remove it only as told by your health care provider.      •Check the skin around the splint, bandage, or brace every day. Tell your health care provider about any concerns.      •Loosen the splint, bandage, or brace if your thumb or fingers tingle, become numb, or turn cold and blue.      •Keep it clean and dry.      If you have a nonremovable cast:     • Do not put pressure on any part of the cast until it is fully hardened. This may take several hours.      • Do not stick anything inside the cast to scratch your skin. Doing that increases your risk of infection.      •Check the skin around the cast every day. Tell your health care provider about any concerns.       •You may put lotion on dry skin around the edges of the cast. Do not put lotion on the skin underneath the cast.       •Keep it clean and dry.      Bathing     • Do not take baths, swim, or use a hot tub until your health care provider approves. Ask your health care provider if you may take showers. You may only be allowed to take sponge baths.    •If your splint, bandage, brace, or cast is not waterproof:  •Do not let it get wet.      •Cover it with a watertight covering when you take a bath or shower.          Managing pain, stiffness, and swelling   Bag of ice on a towel on the skin. •If directed, put ice on your thumb. To do this:  •If you have a removable splint, bandage, or brace, remove it as told by your health care provider.      •Put ice in a plastic bag.      •Place a towel between your skin and the bag, or between your cast and the bag.      •Leave the ice on for 20 minutes, 2–3 times a day.      •Remove the ice if your skin turns bright red. This is very important. If you cannot feel pain, heat, or cold, you have a greater risk of damage to the area.        •Move your fingers often to reduce stiffness and swelling.      •Raise (elevate) the injured area above the level of your heart while you are sitting or lying down.      Activity     •Return to your normal activities as told by your health care provider. Ask your health care provider what activities are safe for you.     •Do physical therapy exercises as directed. After your splint, bandage, brace, or cast is removed, your health care provider may recommend that you:  •Move your thumb in circles.      • Touch your thumb to your pinky finger.      • Do these exercises several times a day.         •Ask your health care provider if you may use a hand exerciser to strengthen your muscles.       •If your thumb feels stiff while you are exercising it, try doing the exercises while soaking your hand in warm water.      Driving     •Ask your health care provider when it is safe to drive if you have a splint, bandage, brace, or cast on your hand or thumb.      •Ask your health care provider if the medicine prescribed to you requires you to avoid driving or using machinery.      General instructions     • Take over-the-counter and prescription medicines only as told by your health care provider.       • Do not use any products that contain nicotine or tobacco. These products include cigarettes, chewing tobacco, and vaping devices, such as e-cigarettes. These can delay bone healing. If you need help quitting, ask your health care provider.      • Do not wear rings on your injured thumb.      •Keep all follow-up visits. This is important.        Contact a health care provider if:    •You have pain that gets worse or does not get better with medicine.      •You have bruising or swelling that gets worse.      •Your cast, brace, or splint is damaged.        Get help right away if:    •Your thumb feels numb, tingles, turns cold, or turns blue, even after loosening your splint, bandage, or brace.        Summary    •A thumb sprain is an injury to one of the bands of tissue that connect bones to each other (a ligament) in your thumb.      •Thumb sprains are more likely to occur in people who play sports that involve a risk of falling or having to catch an object.      •Treatment will depend on how severe the sprain is, but it will require keeping the thumb in a fixed position (immobilization). It might require surgery.      •Make sure you understand and follow all of your health care provider's instructions for home care.      This information is not intended to replace advice given to you by your health care provider. Make sure you discuss any questions you have with your health care provider.      Document Revised: 11/10/2021 Document Reviewed: 11/10/2021    Elsevier Patient Education © 2022 [employer]            ArabicBosnianCanadian FrenchEnglishHaitian [facility] Health[facility]Traditional ChineseVietnamese                                                                                                                                 Shoulder Sprain       A shoulder sprain is a partial or complete tear in one of the tough, fiber-like tissues (ligaments) in the shoulder. The ligaments in the shoulder help to hold the shoulder in place.      What are the causes?    This condition may be caused by:  •A fall.      •A hit to the shoulder.      •A twist of the arm.        What increases the risk?    You are more likely to develop this condition if you:  •Play sports.      •Have problems with balance or coordination.        What are the signs or symptoms?    Symptoms of this condition include:  •Pain when moving the shoulder.      •Limited ability to move the shoulder.      •Swelling and tenderness on top of the shoulder.      •Warmth in the shoulder.      •A change in the shape of the shoulder.      •Redness or bruising on the shoulder.        How is this diagnosed?    This condition is diagnosed with:  •A physical exam. During the exam, you may be asked to do simple exercises with your shoulder.      •Imaging tests such as X-rays, MRI, or a CT scan. These tests can show how severe the sprain is.        How is this treated?    This condition may be treated with:  •Rest.      •Pain medicine.      •Ice.      •A sling or brace. This is used to keep the arm still while the shoulder is healing.      •Physical therapy or rehabilitation exercises. These help to improve the range of motion and strength of the shoulder.      •Surgery (rare). Surgery may be needed if the sprain caused a joint to become unstable. Surgery may also be needed to reduce pain.      Some people may develop ongoing shoulder pain or lose some range of motion in the shoulder. However, most people do not develop long-term problems.      Follow these instructions at home:     If you have a sling or brace:     •Wear the sling or brace as told by your health care provider. Remove it only as told by your health care provider.      •Loosen the sling or brace if your fingers tingle, become numb, or turn cold and blue.      •Keep the sling or brace clean.    •If the sling or brace is not waterproof:  •Do not let it get wet.      •Cover it with a watertight covering when you take a bath or shower.        Activity     •Rest your shoulder.      •Move your arm only as much as told by your health care provider, but move your hand and fingers often to prevent stiffness and swelling.      •Return to your normal activities as told by your health care provider. Ask your health care provider what activities are safe for you.      •Ask your health care provider when it is safe for you to drive if you have a sling or brace on your shoulder.      •If you were shown how to do any exercises, do them as told by your health care provider.      General instructions   •If directed, put ice on the affected area.  •Put ice in a plastic bag.      •Place a towel between your skin and the bag.      •Leave the ice on for 20 minutes, 2–3 times a day.        •Take over-the-counter and prescription medicines only as told by your health care provider.      • Do not use any products that contain nicotine or tobacco, such as cigarettes, e-cigarettes, and chewing tobacco. These can delay healing. If you need help quitting, ask your health care provider.      •Keep all follow-up visits as told by your health care provider. This is important.        Contact a health care provider if:    •Your pain gets worse.      •Your pain is not relieved with medicines.      •You have increased redness or swelling.        Get help right away if:    •You have a fever.      •You cannot move your arm or shoulder.      •You develop severe numbness or tingling in your arm, hand, or fingers.      •Your arm, hand, or fingers feel cold and turn blue, white, or gray.        Summary    •A shoulder sprain is a partial or complete tear in one of the tough, fiber-like tissues (ligaments) in the shoulder.      •This condition may be caused by a fall, a hit to the shoulder, or a twist of the arm.      •Treatment usually includes rest, ice, and pain medicine as needed.      •If you have a sling or brace, wear it as told by your health care provider. Remove it only as told by your health care provider.      This information is not intended to replace advice given to you by your health care provider. Make sure you discuss any questions you have with your health care provider.      Document Revised: 05/24/2019 Document Reviewed: 05/24/2019    [employer] Patient Education © 2022 [employer]
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
## 2022-04-22 NOTE — ED ADULT NURSE NOTE - OBJECTIVE STATEMENT
30 yo male lying on bed c/o left shoulder pain and right arm pain. As per patient, he punches a wall yesterday.

## 2022-04-22 NOTE — ED PROVIDER NOTE - NSFOLLOWUPINSTRUCTIONS_ED_ALL_ED_FT
Prescription approved per [facility] Refill Protocol.    Lianne Alvarado RN  [facility]     Laceration    A laceration is a cut that goes through all of the layers of the skin and into the tissue that is right under the skin. Some lacerations heal on their own. Others need to be closed with skin adhesive strips, skin glue, stitches (sutures), or staples. Proper laceration care minimizes the risk of infection and helps the laceration to heal better.  If non-absorbable stitches or staples have been placed, they must be taken out within the time frame instructed by your healthcare provider.    SEEK IMMEDIATE MEDICAL CARE IF YOU HAVE ANY OF THE FOLLOWING SYMPTOMS: swelling around the wound, worsening pain, drainage from the wound, red streaking going away from your wound, inability to move finger or toe near the laceration, or discoloration of skin near the laceration.

## 2022-04-22 NOTE — ED PROVIDER NOTE - PHYSICAL EXAMINATION
Left shoulder:  Mild tenderness to palpation, no deformity/swelling, full ROM, NV intact  Right thumb:  Mild diffuse tenderness to palpation, no deformity/swelling, full ROM, NV intact

## 2022-04-22 NOTE — ED PROVIDER NOTE - PSYCHIATRIC, MLM
Alert and oriented to person, place, time/situation. normal mood. no apparent risk to self or others.

## 2022-04-23 ENCOUNTER — EMERGENCY (EMERGENCY)
Facility: HOSPITAL | Age: 32
LOS: 1 days | Discharge: HOME | End: 2022-04-23
Attending: EMERGENCY MEDICINE
Payer: MEDICAID

## 2022-04-23 VITALS
HEIGHT: 71 IN | TEMPERATURE: 99 F | HEART RATE: 82 BPM | DIASTOLIC BLOOD PRESSURE: 77 MMHG | OXYGEN SATURATION: 96 % | WEIGHT: 274.92 LBS | SYSTOLIC BLOOD PRESSURE: 109 MMHG | RESPIRATION RATE: 16 BRPM

## 2022-04-23 LAB
ALBUMIN SERPL ELPH-MCNC: 3.2 G/DL — LOW (ref 3.5–5)
ALP SERPL-CCNC: 80 U/L — SIGNIFICANT CHANGE UP (ref 40–120)
ALT FLD-CCNC: 42 U/L DA — SIGNIFICANT CHANGE UP (ref 10–60)
ANION GAP SERPL CALC-SCNC: 5 MMOL/L — SIGNIFICANT CHANGE UP (ref 5–17)
APAP SERPL-MCNC: <2 UG/ML — LOW (ref 10–30)
AST SERPL-CCNC: 23 U/L — SIGNIFICANT CHANGE UP (ref 10–40)
BASOPHILS # BLD AUTO: 0.04 K/UL — SIGNIFICANT CHANGE UP (ref 0–0.2)
BASOPHILS NFR BLD AUTO: 0.5 % — SIGNIFICANT CHANGE UP (ref 0–2)
BILIRUB SERPL-MCNC: 0.3 MG/DL — SIGNIFICANT CHANGE UP (ref 0.2–1.2)
BUN SERPL-MCNC: 14 MG/DL — SIGNIFICANT CHANGE UP (ref 7–18)
CALCIUM SERPL-MCNC: 8.8 MG/DL — SIGNIFICANT CHANGE UP (ref 8.4–10.5)
CHLORIDE SERPL-SCNC: 105 MMOL/L — SIGNIFICANT CHANGE UP (ref 96–108)
CO2 SERPL-SCNC: 28 MMOL/L — SIGNIFICANT CHANGE UP (ref 22–31)
CREAT SERPL-MCNC: 0.82 MG/DL — SIGNIFICANT CHANGE UP (ref 0.5–1.3)
EGFR: 120 ML/MIN/1.73M2 — SIGNIFICANT CHANGE UP
EOSINOPHIL # BLD AUTO: 0.41 K/UL — SIGNIFICANT CHANGE UP (ref 0–0.5)
EOSINOPHIL NFR BLD AUTO: 4.6 % — SIGNIFICANT CHANGE UP (ref 0–6)
ETHANOL SERPL-MCNC: <3 MG/DL — SIGNIFICANT CHANGE UP (ref 0–10)
GLUCOSE SERPL-MCNC: 97 MG/DL — SIGNIFICANT CHANGE UP (ref 70–99)
HCT VFR BLD CALC: 41.6 % — SIGNIFICANT CHANGE UP (ref 39–50)
HGB BLD-MCNC: 12.8 G/DL — LOW (ref 13–17)
IMM GRANULOCYTES NFR BLD AUTO: 0.6 % — SIGNIFICANT CHANGE UP (ref 0–1.5)
LYMPHOCYTES # BLD AUTO: 2.7 K/UL — SIGNIFICANT CHANGE UP (ref 1–3.3)
LYMPHOCYTES # BLD AUTO: 30.6 % — SIGNIFICANT CHANGE UP (ref 13–44)
MCHC RBC-ENTMCNC: 23.5 PG — LOW (ref 27–34)
MCHC RBC-ENTMCNC: 30.8 GM/DL — LOW (ref 32–36)
MCV RBC AUTO: 76.5 FL — LOW (ref 80–100)
MONOCYTES # BLD AUTO: 0.78 K/UL — SIGNIFICANT CHANGE UP (ref 0–0.9)
MONOCYTES NFR BLD AUTO: 8.8 % — SIGNIFICANT CHANGE UP (ref 2–14)
NEUTROPHILS # BLD AUTO: 4.84 K/UL — SIGNIFICANT CHANGE UP (ref 1.8–7.4)
NEUTROPHILS NFR BLD AUTO: 54.9 % — SIGNIFICANT CHANGE UP (ref 43–77)
NRBC # BLD: 0 /100 WBCS — SIGNIFICANT CHANGE UP (ref 0–0)
PLATELET # BLD AUTO: 274 K/UL — SIGNIFICANT CHANGE UP (ref 150–400)
POTASSIUM SERPL-MCNC: 4.3 MMOL/L — SIGNIFICANT CHANGE UP (ref 3.5–5.3)
POTASSIUM SERPL-SCNC: 4.3 MMOL/L — SIGNIFICANT CHANGE UP (ref 3.5–5.3)
PROT SERPL-MCNC: 6.7 G/DL — SIGNIFICANT CHANGE UP (ref 6–8.3)
RBC # BLD: 5.44 M/UL — SIGNIFICANT CHANGE UP (ref 4.2–5.8)
RBC # FLD: 16.5 % — HIGH (ref 10.3–14.5)
SARS-COV-2 RNA SPEC QL NAA+PROBE: SIGNIFICANT CHANGE UP
SODIUM SERPL-SCNC: 138 MMOL/L — SIGNIFICANT CHANGE UP (ref 135–145)
TROPONIN I, HIGH SENSITIVITY RESULT: 6.5 NG/L — SIGNIFICANT CHANGE UP
WBC # BLD: 8.82 K/UL — SIGNIFICANT CHANGE UP (ref 3.8–10.5)
WBC # FLD AUTO: 8.82 K/UL — SIGNIFICANT CHANGE UP (ref 3.8–10.5)

## 2022-04-23 PROCEDURE — 99285 EMERGENCY DEPT VISIT HI MDM: CPT

## 2022-04-23 PROCEDURE — 90792 PSYCH DIAG EVAL W/MED SRVCS: CPT | Mod: 95

## 2022-04-23 PROCEDURE — 93010 ELECTROCARDIOGRAM REPORT: CPT

## 2022-04-23 PROCEDURE — 73130 X-RAY EXAM OF HAND: CPT | Mod: 26,50

## 2022-04-23 PROCEDURE — ZZZZZ: CPT

## 2022-04-23 PROCEDURE — 71045 X-RAY EXAM CHEST 1 VIEW: CPT | Mod: 26

## 2022-04-23 RX ORDER — DIVALPROEX SODIUM 500 MG/1
500 TABLET, DELAYED RELEASE ORAL ONCE
Refills: 0 | Status: COMPLETED | OUTPATIENT
Start: 2022-04-23 | End: 2022-04-23

## 2022-04-23 RX ORDER — ACETAMINOPHEN 500 MG
650 TABLET ORAL ONCE
Refills: 0 | Status: COMPLETED | OUTPATIENT
Start: 2022-04-23 | End: 2022-04-23

## 2022-04-23 RX ORDER — RISPERIDONE 4 MG/1
2 TABLET ORAL ONCE
Refills: 0 | Status: COMPLETED | OUTPATIENT
Start: 2022-04-23 | End: 2022-04-23

## 2022-04-23 RX ADMIN — Medication 650 MILLIGRAM(S): at 22:45

## 2022-04-23 RX ADMIN — RISPERIDONE 2 MILLIGRAM(S): 4 TABLET ORAL at 22:14

## 2022-04-23 RX ADMIN — Medication 650 MILLIGRAM(S): at 22:14

## 2022-04-23 RX ADMIN — DIVALPROEX SODIUM 500 MILLIGRAM(S): 500 TABLET, DELAYED RELEASE ORAL at 22:15

## 2022-04-23 NOTE — CHART NOTE - NSCHARTNOTEFT_GEN_A_CORE
Verbal referral received, Pt requesting to speak with martha.   Pt is a 31 year old  male who was brought to the ED from [facility] with the complaint of arm pain/injury. Pt was visited at bedside re request, he appeared alert and oriented x3. Martha introduced self, role and purpose of visit explained. Emily, Pt's respite was at bedside. Pt reports punching a  wall and has pain to left shoulder/ right thumb from the incident. Emotional support provided. He was encouraged to make needs known appropriately by using his words instead of punching the wall and inflicting injury on himself. He was also encouraged to continue to comply with his treatment plan goals. Pt voiced understanding. Pt was treated and released. X-rays of left shoulder and right thumb with no Fx / dislocation. Pt was socially cleared and Physician made aware. Pt was d/cd to Emily who accompanied Pt back to the [facility] home.

## 2022-04-23 NOTE — ED ADULT NURSE NOTE - ED STAT RN HANDOFF DETAILS
Pt is asleep. No distress noted. Constant observation in progress. Pt is restless and verbally aggressive at times. Pt is easily redirected. Safety maintained.  Report given to ANALI Lockett.

## 2022-04-23 NOTE — ED ADULT NURSE NOTE - INTERVENTIONS DEFINITIONS
Room bathroom lighting operational/Non-slip footwear when patient is off stretcher/Physically safe environment: no spills, clutter or unnecessary equipment/Stretcher in lowest position, wheels locked, appropriate side rails in place/Provide visual cue, wrist band, yellow gown, etc./Monitor gait and stability/Provide visual clues: red socks

## 2022-04-23 NOTE — ED PROVIDER NOTE - PROGRESS NOTE DETAILS
Pt received as a sign out from Dr. Miller pending telepsych evaluation and dispo. As per Dr. Calles of telepsych, pt is cleared for dc. Pt retracted all statements of wanting to hurt himself or others. Pt stable for dc home

## 2022-04-23 NOTE — ED PROVIDER NOTE - PATIENT PORTAL LINK FT
You can access the FollowMyHealth Patient Portal offered by [facility] by registering at the following website: [email or web address]. By joining [employer]’s FollowMyHealth portal, you will also be able to view your health information using other applications (apps) compatible with our system.

## 2022-04-23 NOTE — ED PROVIDER NOTE - MUSCULOSKELETAL, MLM
No spinal ttp, abr L ant thigh. R wrist w ttp mostly at base of thumb mc. there are multiple bruises in various stages of healing to both hands.
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
## 2022-04-23 NOTE — ED PROVIDER NOTE - DATE/TIME 2
Lamonte Guajardo is a 70 year old male presenting for BP f/u      Denies known Latex allergy or symptoms of Latex sensitivity.  Medications reviewed and updated.  Social History     Tobacco Use   Smoking Status Former Smoker   • Packs/day: 0.00   • Types: Cigars   Smokeless Tobacco Never Used   Tobacco Comment    cigars a couple times per year     Health Maintenance Due   Topic Date Due   • Shingles Vaccine (1 of 2) 03/29/1998   • Abdominal Aortic Aneurysm (AAA) Screening  03/29/2013       Patient is due for topics as listed above but is not proceeding with Immunization(s) Shingles and Abdominal Aortic Aneurysm (AAA) screening at this time.     Shingrix: out of stock  AAA screening: will discuss with MD.          24-Apr-2022 10:13
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
## 2022-04-23 NOTE — ED ADULT TRIAGE NOTE - CHIEF COMPLAINT QUOTE
BIBA for a hand injury, pt was feeling angry and punched the wall, pt also c/o chest pain and urinary retention

## 2022-04-23 NOTE — ED PROVIDER NOTE - CLINICAL SUMMARY MEDICAL DECISION MAKING FREE TEXT BOX
home meds. ro acs. unlikely pe or dissection. suicide watch. consider need to have psychiatry see pt in context of already with high level of care.

## 2022-04-24 VITALS
SYSTOLIC BLOOD PRESSURE: 96 MMHG | DIASTOLIC BLOOD PRESSURE: 68 MMHG | TEMPERATURE: 98 F | OXYGEN SATURATION: 97 % | HEART RATE: 73 BPM | RESPIRATION RATE: 18 BRPM

## 2022-04-24 LAB — VALPROATE SERPL-MCNC: 78 UG/ML — SIGNIFICANT CHANGE UP (ref 50–100)

## 2022-04-24 PROCEDURE — 80164 ASSAY DIPROPYLACETIC ACD TOT: CPT

## 2022-04-24 PROCEDURE — 36415 COLL VENOUS BLD VENIPUNCTURE: CPT

## 2022-04-24 PROCEDURE — 99285 EMERGENCY DEPT VISIT HI MDM: CPT | Mod: 25

## 2022-04-24 PROCEDURE — 80053 COMPREHEN METABOLIC PANEL: CPT

## 2022-04-24 PROCEDURE — 87635 SARS-COV-2 COVID-19 AMP PRB: CPT

## 2022-04-24 PROCEDURE — 85025 COMPLETE CBC W/AUTO DIFF WBC: CPT

## 2022-04-24 PROCEDURE — 84484 ASSAY OF TROPONIN QUANT: CPT

## 2022-04-24 PROCEDURE — 80307 DRUG TEST PRSMV CHEM ANLYZR: CPT

## 2022-04-24 PROCEDURE — 73130 X-RAY EXAM OF HAND: CPT

## 2022-04-24 PROCEDURE — 71045 X-RAY EXAM CHEST 1 VIEW: CPT

## 2022-04-24 PROCEDURE — 93005 ELECTROCARDIOGRAM TRACING: CPT

## 2022-04-24 PROCEDURE — 99214 OFFICE O/P EST MOD 30 MIN: CPT | Mod: 95

## 2022-04-24 RX ORDER — DIVALPROEX SODIUM 500 MG/1
500 TABLET, DELAYED RELEASE ORAL ONCE
Refills: 0 | Status: COMPLETED | OUTPATIENT
Start: 2022-04-24 | End: 2022-04-24

## 2022-04-24 RX ORDER — RISPERIDONE 4 MG/1
2 TABLET ORAL ONCE
Refills: 0 | Status: COMPLETED | OUTPATIENT
Start: 2022-04-24 | End: 2022-04-24

## 2022-04-24 RX ADMIN — RISPERIDONE 2 MILLIGRAM(S): 4 TABLET ORAL at 08:19

## 2022-04-24 RX ADMIN — DIVALPROEX SODIUM 500 MILLIGRAM(S): 500 TABLET, DELAYED RELEASE ORAL at 08:18

## 2022-04-24 NOTE — PROGRESS NOTE BEHAVIORAL HEALTH - NSBHCONSULTFOLLOWAFTERCARE_PSY_A_CORE FT
Patient should follow up with outpatient psychiatrist and continue to take home psychiatric medications. Patient also to follow safety plan in case of emergency (see safety plan in chart). Spoke to director of [facility] and patient has appointment with psychiatrist at [place] next week. Pt should keep this appointment.

## 2022-04-24 NOTE — ED ADULT NURSE REASSESSMENT NOTE - NS ED NURSE REASSESS COMMENT FT1
Pt resting in bed, A&OX3, constant observation maintained as per order. PCA at bedside. No acute distress noted, denies chest pain, no shortness of breath indicated.

## 2022-04-24 NOTE — PROGRESS NOTE BEHAVIORAL HEALTH - RISK ASSESSMENT
The patient is currently low imminent risk of harm to self or others, however; he has chronic elevated risk of harm to self or others given his intellectual disability, ASD, and history of impulsive behavior and poor frustration tolerance. The patient also has risk factors that include issues with housing, history of suicidal ideation, history of NSSIB, history of aggression, ADHD, and prior homicidal ideation vs. frustration towards staff. However patient has many protective factors that mitigate these risk factors. Protective factors include patient identifies reasons to live (looking forward to his birthday), he currently is denying ideation to self harm, denying HI/SI, stable domicile in respite with 1:1 observation, supportive family, help seeking, engaged in care and taking medications. Patient no longer wants inpatient hospitalization and he does not meet criteria for involuntary hospitalization at this time as issues such as low frustration tolerance and poor coping skills are chronic and cannot be mitigated in an inpatient setting and patient now is psychiatrically stable. Pt will be discharged back to respite given low imminent risk of harm to self/others.
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
## 2022-04-24 NOTE — PROGRESS NOTE BEHAVIORAL HEALTH - OTHER
superficially cooperative tele concrete, vague concrete, vague, though relevant somewhat monotone upset

## 2022-04-24 NOTE — PROGRESS NOTE BEHAVIORAL HEALTH - NSBHFUPSUICINTERVALFT_PSY_A_CORE
Patient with ideation to harm self, though he states this is chronic. He denies thoughts of wanting to end his life.
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
## 2022-04-24 NOTE — ED BEHAVIORAL HEALTH ASSESSMENT NOTE - NSACTIVEVENT_PSY_ALL_CORE
dislikes gh/Triggering events leading to humiliation, shame, and/or despair (e.g., Loss of relationship, financial or health status) (real or anticipated)

## 2022-04-24 NOTE — ED BEHAVIORAL HEALTH ASSESSMENT NOTE - DESCRIPTION
see HPI Generally calm, did not require PRNs/restraints Born in [place], raised in [place]. Parents are no longer together; mother lives in [place] and works as dialysis technician. Has sister who lives in [place]. Previously living in [facility] group home where he had GF (fellow resident), but relationship ended >2 yrs ago. Living in respite [facility] since 1/2021 due to breakup, conflicts with fellow residents.
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
## 2022-04-24 NOTE — ED BEHAVIORAL HEALTH ASSESSMENT NOTE - CURRENT MEDICATION
unclear, per last d/c summary (December 2021)  VPA 500mg BID  risperdal 1mg BID  ativan 1mg TID  guanfacine

## 2022-04-24 NOTE — PROGRESS NOTE BEHAVIORAL HEALTH - NSBHCHARTREVIEWVS_PSY_A_CORE FT
T(C): 36.4 (04-24-22 @ 07:12)  T(F): 97.5 (04-24-22 @ 07:12), Max: 98.6 (04-23-22 @ 19:47)  HR: 73 (04-24-22 @ 07:12) (73 - 86)  BP: 96/68 (04-24-22 @ 07:12) (96/68 - 110/75)  RR:  (16 - 18)  SpO2:  (96% - 97%)  Wt(kg): --
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
## 2022-04-24 NOTE — ED BEHAVIORAL HEALTH NOTE - BEHAVIORAL HEALTH NOTE
[facility] outreached ED and spoke with ANALI Farrell who connected [facility] with patient's support staff from his group home, [facility]. [facility] provided [facility] with supervisor, Cassy Pak's contact information to provide update on patient. [facility] informed [facility] of patient's disposition to be discharged, [facility] acknowledged.     [facility] outreached, supervisor, Cassy Pak ([phone]) to inform her that patient will be discharged this morning from the ED. Cassy acknowledged. [facility] inquired about patient's upcoming outpatient mental health appointment. Cassy informed [facility] that patient has a follow up appointment at [facility] outpatient clinic this week. [facility] acknowledged.

## 2022-04-24 NOTE — PROGRESS NOTE BEHAVIORAL HEALTH - NSBHCHARTREVIEWLAB_PSY_A_CORE FT
12.8   8.82  )-----------( 274      ( 23 Apr 2022 21:44 )             41.6   23 Apr 2022 21:33    138    |  105    |  14     ----------------------------<  97     4.3     |  28     |  0.82     Ca    8.8        23 Apr 2022 21:33    TPro  6.7    /  Alb  3.2    /  TBili  0.3    /  DBili  x      /  AST  23     /  ALT  42     /  AlkPhos  80     23 Apr 2022 21:33

## 2022-04-27 ENCOUNTER — EMERGENCY (EMERGENCY)
Facility: HOSPITAL | Age: 32
LOS: 1 days | Discharge: HOME | End: 2022-04-27
Attending: EMERGENCY MEDICINE
Payer: MEDICAID

## 2022-04-27 VITALS
OXYGEN SATURATION: 96 % | SYSTOLIC BLOOD PRESSURE: 118 MMHG | HEART RATE: 90 BPM | WEIGHT: 179.9 LBS | HEIGHT: 71 IN | TEMPERATURE: 98 F | DIASTOLIC BLOOD PRESSURE: 80 MMHG | RESPIRATION RATE: 18 BRPM

## 2022-04-27 PROCEDURE — 73130 X-RAY EXAM OF HAND: CPT

## 2022-04-27 PROCEDURE — 73130 X-RAY EXAM OF HAND: CPT | Mod: 26,RT

## 2022-04-27 PROCEDURE — 99285 EMERGENCY DEPT VISIT HI MDM: CPT | Mod: 25

## 2022-04-27 PROCEDURE — 99283 EMERGENCY DEPT VISIT LOW MDM: CPT

## 2022-04-27 NOTE — ED PROVIDER NOTE - PROGRESS NOTE DETAILS
Spoke to patient's facility [facility], who is at bedside. she said patient is on 1:1 24/7 at respite facility. Never alone, accompanied to bathroom. She said patient never punched a wall or stabbed himself. She said patient tried to call his mother today, and when she did not answer the phone, he said he wanted to go to the hospital, which is a common behavior for him when mother does not answer phone. Patient was at [facility] earlier today. Patient has had multiple psych evals, known history of confabulation, denies SI now, is on 1:1 at facility. Will d/c.

## 2022-04-27 NOTE — ED PROVIDER NOTE - SKIN, MLM
Skin normal color for race, warm, dry . No evidence of rash. 0.5x0.5cm superficial abrasion to left anterior thigh

## 2022-04-27 NOTE — ED PROVIDER NOTE - OBJECTIVE STATEMENT
Patient reports he punched a wall yesterday and today because he was frustrated and has pain in his right hand. Here on 4/24 for similar complaints. Said he also stabbed himself in the left leg. No fever, cp, sob, ap, n/v/d, SI/HI.

## 2022-04-27 NOTE — ED PROVIDER NOTE - NSFOLLOWUPINSTRUCTIONS_ED_ALL_ED_FT
Contusion      A contusion is a deep bruise. Contusions are the result of a blunt injury to tissues and muscle fibers under the skin. The injury causes bleeding under the skin. The skin overlying the contusion may turn blue, purple, or yellow. Minor injuries will give you a painless contusion, but more severe injuries cause contusions that may stay painful and swollen for a few weeks.      Follow these instructions at home:    Pay attention to any changes in your symptoms. Let your health care provider know about them. Take these actions to relieve your pain.      Managing pain, stiffness, and swelling    •Use resting, icing, applying pressure (compression), and raising (elevating) the injured area. This is often called the RICE strategy.  •Rest the injured area. Return to your normal activities as told by your health care provider. Ask your health care provider what activities are safe for you.    •If directed, put ice on the injured area:  •Put ice in a plastic bag.      •Place a towel between your skin and the bag.      •Leave the ice on for 20 minutes, 2–3 times per day.        •If directed, apply light compression to the injured area using an elastic bandage. Make sure the bandage is not wrapped too tightly. Remove and reapply the bandage as directed by your health care provider.      •If possible, raise (elevate) the injured area above the level of your heart while you are sitting or lying down.        General instructions     •Take over-the-counter and prescription medicines only as told by your health care provider.      •Keep all follow-up visits as told by your health care provider. This is important.        Contact a health care provider if:    •Your symptoms do not improve after several days of treatment.      •Your symptoms get worse.      •You have difficulty moving the injured area.        Get help right away if:    •You have severe pain.      •You have numbness in a hand or foot.      •Your hand or foot turns pale or cold.        Summary    •A contusion is a deep bruise.      •Contusions are the result of a blunt injury to tissues and muscle fibers under the skin.      •It is treated with rest, ice, compression, and elevation. You may be given over-the-counter medicines for pain.      •Contact a health care provider if your symptoms do not improve, or get worse.       •Get help right away if you have severe pain, have numbness, or the area turns pale or cold.      This information is not intended to replace advice given to you by your health care provider. Make sure you discuss any questions you have with your health care provider.      Document Revised: 08/08/2019 Document Reviewed: 08/08/2019    Elsevier Patient Education © 2022 [employer]

## 2022-04-27 NOTE — ED PROVIDER NOTE - PATIENT PORTAL LINK FT
You can access the FollowMyHealth Patient Portal offered by [facility] by registering at the following website: [email or web address]. By joining [employer]’s FollowMyHealth portal, you will also be able to view your health information using other applications (apps) compatible with our system.

## 2022-04-30 ENCOUNTER — EMERGENCY (EMERGENCY)
Facility: HOSPITAL | Age: 32
LOS: 1 days | Discharge: HOME | End: 2022-04-30
Attending: EMERGENCY MEDICINE
Payer: MEDICAID

## 2022-04-30 VITALS
DIASTOLIC BLOOD PRESSURE: 91 MMHG | HEIGHT: 71 IN | HEART RATE: 101 BPM | OXYGEN SATURATION: 97 % | WEIGHT: 179.9 LBS | SYSTOLIC BLOOD PRESSURE: 171 MMHG | TEMPERATURE: 98 F | RESPIRATION RATE: 14 BRPM

## 2022-04-30 LAB
ALBUMIN SERPL ELPH-MCNC: 3.1 G/DL — LOW (ref 3.5–5)
ALP SERPL-CCNC: 90 U/L — SIGNIFICANT CHANGE UP (ref 40–120)
ALT FLD-CCNC: 45 U/L DA — SIGNIFICANT CHANGE UP (ref 10–60)
ANION GAP SERPL CALC-SCNC: 9 MMOL/L — SIGNIFICANT CHANGE UP (ref 5–17)
AST SERPL-CCNC: 57 U/L — HIGH (ref 10–40)
BILIRUB SERPL-MCNC: 0.2 MG/DL — SIGNIFICANT CHANGE UP (ref 0.2–1.2)
BUN SERPL-MCNC: 14 MG/DL — SIGNIFICANT CHANGE UP (ref 7–18)
CALCIUM SERPL-MCNC: 9.2 MG/DL — SIGNIFICANT CHANGE UP (ref 8.4–10.5)
CHLORIDE SERPL-SCNC: 105 MMOL/L — SIGNIFICANT CHANGE UP (ref 96–108)
CO2 SERPL-SCNC: 23 MMOL/L — SIGNIFICANT CHANGE UP (ref 22–31)
CREAT SERPL-MCNC: 0.98 MG/DL — SIGNIFICANT CHANGE UP (ref 0.5–1.3)
EGFR: 105 ML/MIN/1.73M2 — SIGNIFICANT CHANGE UP
GLUCOSE SERPL-MCNC: 187 MG/DL — HIGH (ref 70–99)
HCT VFR BLD CALC: 42.8 % — SIGNIFICANT CHANGE UP (ref 39–50)
HGB BLD-MCNC: 13.2 G/DL — SIGNIFICANT CHANGE UP (ref 13–17)
MCHC RBC-ENTMCNC: 23.7 PG — LOW (ref 27–34)
MCHC RBC-ENTMCNC: 30.8 GM/DL — LOW (ref 32–36)
MCV RBC AUTO: 77 FL — LOW (ref 80–100)
NRBC # BLD: 0 /100 WBCS — SIGNIFICANT CHANGE UP (ref 0–0)
PLATELET # BLD AUTO: 135 K/UL — LOW (ref 150–400)
POTASSIUM SERPL-MCNC: 5.1 MMOL/L — SIGNIFICANT CHANGE UP (ref 3.5–5.3)
POTASSIUM SERPL-SCNC: 5.1 MMOL/L — SIGNIFICANT CHANGE UP (ref 3.5–5.3)
PROT SERPL-MCNC: 6.8 G/DL — SIGNIFICANT CHANGE UP (ref 6–8.3)
RBC # BLD: 5.56 M/UL — SIGNIFICANT CHANGE UP (ref 4.2–5.8)
RBC # FLD: 16.5 % — HIGH (ref 10.3–14.5)
SODIUM SERPL-SCNC: 137 MMOL/L — SIGNIFICANT CHANGE UP (ref 135–145)
TROPONIN I, HIGH SENSITIVITY RESULT: 6.2 NG/L — SIGNIFICANT CHANGE UP
WBC # BLD: 6.88 K/UL — SIGNIFICANT CHANGE UP (ref 3.8–10.5)
WBC # FLD AUTO: 6.88 K/UL — SIGNIFICANT CHANGE UP (ref 3.8–10.5)

## 2022-04-30 PROCEDURE — 71045 X-RAY EXAM CHEST 1 VIEW: CPT | Mod: 26

## 2022-04-30 PROCEDURE — 99284 EMERGENCY DEPT VISIT MOD MDM: CPT

## 2022-04-30 NOTE — ED PROVIDER NOTE - OBJECTIVE STATEMENT
30 yo M pmh of psych presents with c/o chest pain earlier today but none now. Denies other acute complaints.

## 2022-04-30 NOTE — ED PROVIDER NOTE - NS ED ROS FT
CONSTITUTIONAL: no fever  EYES: no eye pain   ENMT: no throat pain  CARDIOVASCULAR: +chest pain   RESPIRATORY: no shortness of breath   GASTROINTESTINAL: no abdominal pain   GENITOURINARY: no dysuria   SKIN: no rashes  NEUROLOGICAL: no headache

## 2022-04-30 NOTE — ED PROVIDER NOTE - NS ED MD DISPO DISCHARGE
Sebaceous cyst, lipoma of upper back/normal/normal shape/ROM intact Home normal shape/ROM intact/strength intact

## 2022-04-30 NOTE — ED ADULT NURSE NOTE - PAIN: BODY LOCATION
[address]   Progress Note and Procedure Note      [address] RECORD NUMBER:  [id number]  AGE: 60 y.o. GENDER: female  : 1963  EPISODE DATE:  2022    Subjective:     Chief Complaint   Patient presents with    Wound Check         HISTORY of PRESENT ILLNESS HPI     Meena Saldana is a 60 y.o. female who presents today for wound/ulcer evaluation. Wound Context: Pt with paraplegia with multiple pressure wounds here for eval/treat  Wound/Ulcer Pain Timing/Severity: none  Quality of pain: N/A  Severity:  0 / 10   Modifying Factors: None  Associated Signs/Symptoms: none    Ulcer Identification:  Ulcer Type: pressure  Contributing Factors: chronic pressure and decreased mobility    Wound: pressure        PAST MEDICAL HISTORY        Diagnosis Date    Blood circulation, collateral     Chronic kidney disease     bladder removed-no kidney problems    GERD (gastroesophageal reflux disease)     History of blood transfusion     History of urostomy     Neuromuscular disorder (HCC)     parapalegic t10-l1    Osteomyelitis (HCC)     Pressure ulcer     to left ischium and bilat heels       PAST SURGICAL HISTORY    Past Surgical History:   Procedure Laterality Date    BACK SURGERY  1979    Removed part of outer spine after tumor T 10-L-1    BLADDER SURGERY  2019    bladder diversion surgery     SECTION  1981    ENDOSCOPY, COLON, DIAGNOSTIC      IVC FILTER INSERTION  2009    Had a blood clot and filter placement    LEG SURGERY Right 2009    osteomyelitis surgery and MRSA Dr. Minerva Johnston removed part of bone    OTHER SURGICAL HISTORY Left     Skin Flap to L.  Buttock years ago more than 20 years ago    SMALL INTESTINE SURGERY N/A 2019    DIVERTING LOOP COLOSTOMY performed by Dulce Cao MD at [facility] HISTORY    Family History   Problem Relation Age of Onset    Other Mother          of sepsis    Cancer Father         Lung Cancer    Diabetes Paternal Grandmother     Heart Attack Paternal Grandfather        SOCIAL HISTORY    Social History     Tobacco Use    Smoking status: Never Smoker    Smokeless tobacco: Never Used   Vaping Use    Vaping Use: Never used   Substance Use Topics    Alcohol use: Not Currently    Drug use: Never       ALLERGIES    Allergies   Allergen Reactions    Morphine And Related Nausea Only     Caused severe altered mental status       MEDICATIONS    Current Outpatient Medications on File Prior to Encounter   Medication Sig Dispense Refill    clindamycin (CLEOCIN) 300 MG capsule Take 300 mg by mouth 2 times daily      gentamicin (GARAMYCIN) 1.6 MG/ mg /L moisten gauze and apply to wound twice daily 50 mL 5    ferrous sulfate (IRON 325) 325 (65 Fe) MG tablet Take 325 mg by mouth daily (with breakfast)       SODIUM CHLORIDE, EXTERNAL, (SALINE WOUND WASH) 0.9 % SOLN 1000 cc bottle normal saline. Apply as directed 1000 mL 3    sodium hypochlorite (DAKINS) 0.125 % SOLN external solution Moisten gauze apply to wound twice daily 1 Bottle 3    collagenase (SANTYL) 250 UNIT/GM ointment Apply 1 applicator topically daily      ALPRAZolam (XANAX) 1 MG tablet Take 1 mg by mouth 3 times daily as needed for Anxiety.  HYDROcodone-acetaminophen (NORCO)  MG per tablet Take 1 tablet by mouth every 6 hours as needed for Pain.  omeprazole (PRILOSEC) 20 MG delayed release capsule Take 20 mg by mouth daily as needed       zinc 50 MG CAPS Take 1 capsule by mouth daily      baclofen (LIORESAL) 10 MG tablet Take 10 mg by mouth 3 times daily        No current facility-administered medications on file prior to encounter. REVIEW OF SYSTEMS    A comprehensive review of systems was negative.     Objective:      BP (!) 165/69   Pulse 76   Temp 97.3 °F (36.3 °C) (Temporal)   Resp 20   Ht 5' 8\" (1.727 m)   Wt 180 lb (81.6 kg)   BMI 27.37 kg/m²     Wt Readings from Last 3 Encounters:   02/28/22 180 lb (81.6 kg)   01/25/22 180 lb (81.6 kg)   12/20/21 180 lb (81.6 kg)       PHYSICAL EXAM    General Appearance: alert and oriented to person, place and time, well developed and well- nourished, in no acute distress  Skin: warm and dry, no rash or erythema  Head: normocephalic and atraumatic  Eyes: pupils equal, round, and reactive to light, extraocular eye movements intact, conjunctivae normal  ENT: tympanic membrane, external ear and ear canal normal bilaterally, nose without deformity, nasal mucosa and turbinates normal without polyps, lips teeth and gums normal  Neck: supple and non-tender without mass, no thyromegaly or thyroid nodules, no cervical lymphadenopathy  Pulmonary/Chest: clear to auscultation bilaterally- no wheezes, rales or rhonchi, normal air movement, no respiratory distress  Cardiovascular: normal rate, regular rhythm, normal S1 and S2, no murmurs, rubs, clicks, or gallops, distal pulses intact, no carotid bruits  Abdomen: soft, non-tender, non-distended, normal bowel sounds, no masses or organomegaly  Extremities: no cyanosis, clubbing or edema      Assessment:      Problem List Items Addressed This Visit     Decubitus ulcer of ischium, right, stage IV (HCC) (Chronic)    Relevant Orders    Supply: Wound Cleanser    Decubitus ulcer of left heel, stage 4 (HCC) - Primary (Chronic)    Relevant Orders    Supply: Wound Cleanser    Decubitus ulcer of right foot, stage 4 (HCC) (Chronic)    Relevant Orders    Supply: Wound Cleanser    Decubitus ulcer of left leg, stage 4 (HCC)    Relevant Orders    Supply: Wound Cleanser    Venous stasis ulcer of right calf with fat layer exposed without varicose veins (HCC)    Relevant Orders    Supply: Wound Cleanser           Procedure Note  Indications:  Based on my examination of this patient's wound(s)/ulcer(s) today, debridement is required to promote healing and evaluate the wound base.     Performed by: Reza Shah MD    Consent obtained:  Yes    Time out taken: Yes    Pain Control: Anesthetic  Anesthetic: None       Debridement:Excisional Debridement    Using #15 blade scalpel the wound(s)/ulcer(s) was/were sharply debrided down through and including the removal of epidermis, dermis and subcutaneous tissue. Devitalized Tissue Debrided:  fibrin, biofilm, slough, necrotic/eschar and exudate      Pre Debridement Measurements:  Are located in the Wound/Ulcer Documentation Flow Sheet    Wound/Ulcer #: 3a,5,6    Percent of Wound(s)/Ulcer(s) Debrided: 100%    Total Surface Area Debrided:  128 sq cm       Diabetic/Pressure/Non Pressure Ulcers only:  Ulcer: Pressure ulcer, Stage 4      Debridement:Non-excisional Debridement    Using curette the wound(s)/ulcer(s) was/were sharply debrided down through and including the removal of epidermis and dermis. Devitalized Tissue Debrided:  fibrin, biofilm, slough, necrotic/eschar and exudate    Pre Debridement Measurements:  Are located in the Wound/Ulcer Documentation Flow Sheet    Wound/Ulcer #: 1, 2 and 7    Percent of Wound(s)/Ulcer(s) Debrided: 100%    Total Surface Area Debrided:  19 sq cm       Diabetic/Pressure/Non Pressure Ulcers only:  Ulcer: Pressure ulcer, Stage 3                Post Debridement Measurements:    Wound/Ulcer Descriptions are Pre Debridement --EXCEPT MEASUREMENTS    Wound 12/07/20 Left Wound 5. Left Ischium Pressure Stage 4 (Active)   Wound Image   02/28/22 1349   Wound Etiology Pressure Stage  4 02/28/22 1349   Dressing Status Old drainage noted 02/28/22 1349   Wound Cleansed Soap and water 02/28/22 1349   Dressing/Treatment ABD; Moist to moist;Roll gauze;Tape/Soft cloth adhesive tape 01/25/22 1500   Offloading for Diabetic Foot Ulcers No offloading required 11/15/21 1335   Wound Length (cm) 8 cm 02/28/22 1349   Wound Width (cm) 5 cm 02/28/22 1349   Wound Depth (cm) 5 cm 02/28/22 1349   Wound Surface Area (cm^2) 40 cm^2 02/28/22 1349   Change in Wound Size % (l*w) 59.6 02/28/22 1349   Wound Volume (cm^3) 200 cm^3 02/28/22 1349   Wound Healing % 84 02/28/22 1349   Post-Procedure Length (cm) 8 cm 02/28/22 1430   Post-Procedure Width (cm) 5 cm 02/28/22 1430   Post-Procedure Depth (cm) 5 cm 02/28/22 1430   Post-Procedure Surface Area (cm^2) 40 cm^2 02/28/22 1430   Post-Procedure Volume (cm^3) 200 cm^3 02/28/22 1430   Distance Tunneling (cm) 9 cm 02/28/22 1349   Tunneling Position ___ O'Clock 6 02/28/22 1349   Undermining Starts ___ O'Clock 0 02/28/22 1349   Undermining Ends___ O'Clock 0 02/28/22 1349   Undermining Maxium Distance (cm) 0 02/28/22 1349   Wound Assessment [place]/red;Slough 02/28/22 1349   Drainage Amount Large 02/28/22 1349   Drainage Description Serosanguinous 02/28/22 1349   Odor None 02/28/22 1349   Nellie-wound Assessment Blanchable erythema 02/28/22 1349   Margins Defined edges 02/28/22 1349   Number of days: 447       Wound 12/07/20 Ischium Right Wound 6. Right Ischium Stage 4 (Active)   Wound Image   02/28/22 1349   Wound Etiology Pressure Stage  4 02/28/22 1349   Dressing Status Old drainage noted 02/28/22 1349   Wound Cleansed Soap and water 01/25/22 1400   Dressing/Treatment ABD; Moist to moist;Roll gauze;Tape/Soft cloth adhesive tape 01/25/22 1500   Offloading for Diabetic Foot Ulcers Yes (type); Other (comment) 12/20/21 1359   Wound Length (cm) 9 cm 02/28/22 1349   Wound Width (cm) 2.5 cm 02/28/22 1349   Wound Depth (cm) 2 cm 02/28/22 1349   Wound Surface Area (cm^2) 22.5 cm^2 02/28/22 1349   Change in Wound Size % (l*w) 69.7 02/28/22 1349   Wound Volume (cm^3) 45 cm^3 02/28/22 1349   Wound Healing % 85 02/28/22 1349   Post-Procedure Length (cm) 9 cm 02/28/22 1430   Post-Procedure Width (cm) 2.5 cm 02/28/22 1430   Post-Procedure Depth (cm) 2 cm 02/28/22 1430   Post-Procedure Surface Area (cm^2) 22.5 cm^2 02/28/22 1430   Post-Procedure Volume (cm^3) 45 cm^3 02/28/22 1430   Distance Tunneling (cm) 4.5 cm 02/28/22 1349   Tunneling Position ___ O'Clock 12 02/28/22 1349   Undermining Starts ___ O'Clock 0 02/28/22 1349   Undermining Ends___ O'Clock 0 02/28/22 1349   Undermining Maxium Distance (cm) 0 02/28/22 1349   Wound Assessment [place]/red;Slough 02/28/22 1349   Drainage Amount Large 02/28/22 1349   Drainage Description Serosanguinous 02/28/22 1349   Odor None 02/28/22 1349   Nellie-wound Assessment Denuded; Intact 02/28/22 1349   Margins Defined edges 02/28/22 1349   Wound Thickness Description not for Pressure Injury Full thickness 07/06/21 1420   Number of days: 447       Wound 05/03/21 Foot Left;Plantar wound 3a.  left lateral plantar foot (wound has  from wound #3) 5/3/21Pressure Stage 4 (Active)   Wound Image   02/28/22 1349   Wound Etiology Pressure Stage  4 02/28/22 1349   Dressing Status Old drainage noted 02/28/22 1349   Wound Cleansed Soap and water 02/28/22 1349   Dressing/Treatment ABD;Gauze dressing/dressing sponge;Moist to moist;Roll gauze;Tape/Soft cloth adhesive tape 01/25/22 1500   Wound Length (cm) 12 cm 02/28/22 1349   Wound Width (cm) 5.5 cm 02/28/22 1349   Wound Depth (cm) 1.6 cm 02/28/22 1349   Wound Surface Area (cm^2) 66 cm^2 02/28/22 1349   Change in Wound Size % (l*w) -1275 02/28/22 1349   Wound Volume (cm^3) 105.6 cm^3 02/28/22 1349   Wound Healing % -09061 02/28/22 1349   Post-Procedure Length (cm) 12 cm 02/28/22 1430   Post-Procedure Width (cm) 5.5 cm 02/28/22 1430   Post-Procedure Depth (cm) 1.6 cm 02/28/22 1430   Post-Procedure Surface Area (cm^2) 66 cm^2 02/28/22 1430   Post-Procedure Volume (cm^3) 105.6 cm^3 02/28/22 1430   Distance Tunneling (cm) 0 cm 02/28/22 1349   Tunneling Position ___ O'Clock 0 02/28/22 1349   Undermining Starts ___ O'Clock 0 02/28/22 1349   Undermining Ends___ O'Clock 0 02/28/22 1349   Undermining Maxium Distance (cm) 0 02/28/22 1349   Wound Assessment [place]/red;Slough 02/28/22 1349   Drainage Amount Moderate 02/28/22 1349   Drainage Description Serosanguinous 02/28/22 1349   Odor None 02/28/22 1349   Nellie-wound Assessment Blanchable erythema 02/28/22 1349   Margins Attached edges 02/28/22 1349   Wound Thickness Description not for Pressure Injury Full thickness 07/06/21 1420   Number of days: 301       Wound 07/06/21 Leg Right;Lateral Wound 2 right lateral lower ext. ( changed Pressure Stage 4 10/11/21 exposed structure) (Active)   Wound Image   02/28/22 1349   Wound Etiology Pressure Stage  4 02/28/22 1349   Dressing Status New drainage noted 02/28/22 1349   Wound Cleansed Soap and water 02/28/22 1349   Dressing/Treatment ABD;Gauze dressing/dressing sponge;Moist to moist;Roll gauze;Tape/Soft cloth adhesive tape 01/25/22 1500   Wound Length (cm) 3 cm 02/28/22 1349   Wound Width (cm) 1.7 cm 02/28/22 1349   Wound Depth (cm) 0.2 cm 02/28/22 1349   Wound Surface Area (cm^2) 5.1 cm^2 02/28/22 1349   Change in Wound Size % (l*w) -82.14 02/28/22 1349   Wound Volume (cm^3) 1.02 cm^3 02/28/22 1349   Wound Healing % -264 02/28/22 1349   Post-Procedure Length (cm) 3 cm 02/28/22 1430   Post-Procedure Width (cm) 1.7 cm 02/28/22 1430   Post-Procedure Depth (cm) 0.2 cm 02/28/22 1430   Post-Procedure Surface Area (cm^2) 5.1 cm^2 02/28/22 1430   Post-Procedure Volume (cm^3) 1.02 cm^3 02/28/22 1430   Distance Tunneling (cm) 0 cm 02/28/22 1349   Tunneling Position ___ O'Clock 0 02/28/22 1349   Undermining Starts ___ O'Clock 0 02/28/22 1349   Undermining Ends___ O'Clock 0 02/28/22 1349   Undermining Maxium Distance (cm) 0 02/28/22 1349   Wound Assessment [place]/red;Slough 02/28/22 1349   Drainage Amount Moderate 02/28/22 1349   Drainage Description Serosanguinous 02/28/22 1349   Odor None 02/28/22 1349   Nellie-wound Assessment Blanchable erythema;Dry/flaky 02/28/22 1349   Margins Attached edges 02/28/22 1349   Wound Thickness Description not for Pressure Injury Full thickness 07/19/21 1406   Number of days: 237       Wound 07/19/21 Foot Right;Plantar;Mid WOUND1 RIGHT MID PLANTAR PRESSURE STG 4 (Active)   Wound Image   02/28/22 1349   Wound Etiology Pressure Stage  4 02/28/22 1349   Dressing Status Old drainage noted 02/28/22 1349   Wound Cleansed Soap and water 02/28/22 1349   Dressing/Treatment ABD;Gauze dressing/dressing sponge;Moist to moist;Roll gauze;Tape/Soft cloth adhesive tape 01/25/22 1500   Wound Length (cm) 5.5 cm 02/28/22 1349   Wound Width (cm) 2 cm 02/28/22 1349   Wound Depth (cm) 0.2 cm 02/28/22 1349   Wound Surface Area (cm^2) 11 cm^2 02/28/22 1349   Change in Wound Size % (l*w) -83.33 02/28/22 1349   Wound Volume (cm^3) 2.2 cm^3 02/28/22 1349   Wound Healing % -267 02/28/22 1349   Post-Procedure Length (cm) 5.5 cm 02/28/22 1430   Post-Procedure Width (cm) 2 cm 02/28/22 1430   Post-Procedure Depth (cm) 0.2 cm 02/28/22 1430   Post-Procedure Surface Area (cm^2) 11 cm^2 02/28/22 1430   Post-Procedure Volume (cm^3) 2.2 cm^3 02/28/22 1430   Distance Tunneling (cm) 0 cm 02/28/22 1349   Tunneling Position ___ O'Clock 0 02/28/22 1349   Undermining Starts ___ O'Clock 0 02/28/22 1349   Undermining Ends___ O'Clock 0 02/28/22 1349   Undermining Maxium Distance (cm) 0 02/28/22 1349   Wound Assessment [place]/red;Slough 02/28/22 1349   Drainage Amount Moderate 02/28/22 1349   Drainage Description Serosanguinous 02/28/22 1349   Odor None 02/28/22 1349   Nellie-wound Assessment Dry/flaky; Ecchymosis 02/28/22 1349   Margins Attached edges 02/28/22 1349   Number of days: 224       Wound 02/28/22 Buttocks Right #7 right outer hip (Pressure injury Stage 2) (Active)   Wound Image   02/28/22 1349   Wound Etiology Pressure Stage  2 02/28/22 1349   Dressing Status Old drainage noted 02/28/22 1349   Wound Cleansed Soap and water 02/28/22 1349   Wound Length (cm) 2.5 cm 02/28/22 1349   Wound Width (cm) 1 cm 02/28/22 1349   Wound Depth (cm) 0.1 cm 02/28/22 1349   Wound Surface Area (cm^2) 2.5 cm^2 02/28/22 1349   Wound Volume (cm^3) 0.25 cm^3 02/28/22 1349   Post-Procedure Length (cm) 2.5 cm 02/28/22 1430   Post-Procedure Width (cm) 1 cm 02/28/22 1430   Post-Procedure Depth (cm) 0.1 cm 02/28/22 1430   Post-Procedure Surface Area (cm^2) 2.5 cm^2 02/28/22 1430   Post-Procedure Volume (cm^3) 0.25 cm^3 02/28/22 1430   Distance Tunneling (cm) 0 cm 02/28/22 1349   Tunneling Position ___ O'Clock 0 02/28/22 1349   Undermining Starts ___ O'Clock 0 02/28/22 1349   Undermining Ends___ O'Clock 0 02/28/22 1349   Undermining Maxium Distance (cm) 0 02/28/22 1349   Wound Assessment [place]/red;Slough 02/28/22 1349   Drainage Amount Moderate 02/28/22 1349   Drainage Description Serosanguinous 02/28/22 1349   Odor None 02/28/22 1349   Nellie-wound Assessment Blanchable erythema 02/28/22 1349   Margins Defined edges 02/28/22 1349   Number of days: 0             Estimated Blood Loss:  Minimal    Hemostasis Achieved:  by pressure    Procedural Pain:  0  / 10     Post Procedural Pain:  0 / 10     Response to treatment:  Well tolerated by patient. Plan:     Problem List Items Addressed This Visit     Decubitus ulcer of ischium, right, stage IV (HCC) (Chronic)    Relevant Orders    Supply: Wound Cleanser    Decubitus ulcer of left heel, stage 4 (HCC) - Primary (Chronic)    Relevant Orders    Supply: Wound Cleanser    Decubitus ulcer of right foot, stage 4 (HCC) (Chronic)    Relevant Orders    Supply: Wound Cleanser    Decubitus ulcer of left leg, stage 4 (HCC)    Relevant Orders    Supply: Wound Cleanser    Venous stasis ulcer of right calf with fat layer exposed without varicose veins (HCC)    Relevant Orders    Supply: Wound Cleanser          Pt wounds stable. Pt using gent solution and some santyl. Pt also looking into hematologist and nutritionist as well as [address] [place] [address] wound center when weather improves for travel. Cont with current care RTO 2 weeks. Treatment Note please see attached Discharge Instructions    In my professional opinion this patient would benefit from HBO Therapy: No    Written patient dismissal instructions given to patient and signed by patient or POA.          Discharge Instructions         [id number] [facility] [id number] [facility] Therapy   Physician Orders and Discharge Instructions  [address]  Telephone: [phone]    NAME:  Jared Mena OF BIRTH:  1963  MEDICAL RECORD NUMBER:  [id number]  DATE:  2/28/2022    Discharge condition: Stable    Discharge to: Home    Left via:Private automobile    Accompanied by: Self     ECF/HHA: [facility]      Dressing Orders: Right and Left Lower Ext and Feet Wounds  Soap and water wash, Gentamycin moist gauze to wound bed, Secure with dry gauze (ABD if needed) roll gauze and medipore tape  Change 1-2 times daily     Dressing Orders: Right and Left Ishium,  Soap and water wash, Gentamycin moist roll gauze tucked into the wound, Secure with dry gauze (ABD if needed) and medipore tape  Change 1-2 times daily     Dressing Orders: Right Hip  Mepilex Border, Change every 2-3 days and as needed      Treatment Orders: Avoid Pressure to wound site. Patient refused MARYSOL bed  Use ROHO cushion, Heel lift boots, check air in ROHO cushion to make sure its inflated properly  Turn frequently (turn at least every two hours when in bed)  While in chair reposition every 30 minutes  Patient advised to sit up no more than 30minutes at a time for meals ONLY. Please do not elevate the head of the bed higher than 30 degrees. Off-load heels by applying heel-lift boots or \"float\" heels by placing pillows under calves so that heels do not touch  High Protein as Tolerated    [facility] follow up visit _____________2 weeks________________  (Please note your next appointment above and if you are unable to keep, kindly give a 24 hour notice.  Thank you.)    If you experience any of the following, please call the [address] [place] [address] Road during business hours:    * Increase in Pain  * Temperature over 101  * Increase in drainage from your wound  * Drainage with a foul odor  * Bleeding  * Increase in swelling  * Need for compression bandage changes due to slippage, breakthrough drainage. If you need medical attention outside of the business hours of the [address] [place] [address] Road please contact your PCP or go to the nearest emergency room.         Electronically signed by Juventino Link MD on 2/28/2022 at 2:46 PM chest

## 2022-04-30 NOTE — ED PROVIDER NOTE - PHYSICAL EXAMINATION
GENERAL: well appearing, no acute distress   HEAD: atraumatic   EYES: EOMI   ENT: moist oral mucosa   CARDIAC: regular rate  RESPIRATORY: no increased work of breathing   MUSCULOSKELETAL: no deformity   NEUROLOGICAL: alert, spontaneous movement of extremities   SKIN: no visible rash  PSYCHIATRIC: cooperative

## 2022-04-30 NOTE — ED PROVIDER NOTE - PATIENT PORTAL LINK FT
You can access the FollowMyHealth Patient Portal offered by [facility] by registering at the following website: [email or web address]. By joining [employer]’s FollowMyHealth portal, you will also be able to view your health information using other applications (apps) compatible with our system.

## 2022-04-30 NOTE — ED PROVIDER NOTE - PROGRESS NOTE DETAILS
EKG - nsr, rate 92, QTc 432, isolated twi lead III labs - trop neg  CXR - lungs clear, no PTX  Dc out pt fu

## 2022-05-02 ENCOUNTER — INPATIENT (INPATIENT)
Facility: HOSPITAL | Age: 32
LOS: 0 days | Discharge: NURSING, REHAB OR LONG-TERM CARE | DRG: 726 | End: 2022-05-03
Attending: INTERNAL MEDICINE | Admitting: INTERNAL MEDICINE
Payer: MEDICAID

## 2022-05-02 VITALS
TEMPERATURE: 99 F | SYSTOLIC BLOOD PRESSURE: 105 MMHG | RESPIRATION RATE: 18 BRPM | HEIGHT: 71 IN | HEART RATE: 101 BPM | DIASTOLIC BLOOD PRESSURE: 77 MMHG | WEIGHT: 293.21 LBS | OXYGEN SATURATION: 97 %

## 2022-05-02 LAB
ALBUMIN SERPL ELPH-MCNC: 3 G/DL — LOW (ref 3.5–5)
ALP SERPL-CCNC: 80 U/L — SIGNIFICANT CHANGE UP (ref 40–120)
ALT FLD-CCNC: 38 U/L DA — SIGNIFICANT CHANGE UP (ref 10–60)
ANION GAP SERPL CALC-SCNC: 6 MMOL/L — SIGNIFICANT CHANGE UP (ref 5–17)
APAP SERPL-MCNC: <2 UG/ML — LOW (ref 10–30)
AST SERPL-CCNC: 14 U/L — SIGNIFICANT CHANGE UP (ref 10–40)
BASOPHILS # BLD AUTO: 0.04 K/UL — SIGNIFICANT CHANGE UP (ref 0–0.2)
BASOPHILS NFR BLD AUTO: 0.5 % — SIGNIFICANT CHANGE UP (ref 0–2)
BILIRUB SERPL-MCNC: 0.2 MG/DL — SIGNIFICANT CHANGE UP (ref 0.2–1.2)
BUN SERPL-MCNC: 20 MG/DL — HIGH (ref 7–18)
CALCIUM SERPL-MCNC: 9.1 MG/DL — SIGNIFICANT CHANGE UP (ref 8.4–10.5)
CHLORIDE SERPL-SCNC: 105 MMOL/L — SIGNIFICANT CHANGE UP (ref 96–108)
CO2 SERPL-SCNC: 29 MMOL/L — SIGNIFICANT CHANGE UP (ref 22–31)
CREAT SERPL-MCNC: 0.79 MG/DL — SIGNIFICANT CHANGE UP (ref 0.5–1.3)
EGFR: 121 ML/MIN/1.73M2 — SIGNIFICANT CHANGE UP
EOSINOPHIL # BLD AUTO: 0.45 K/UL — SIGNIFICANT CHANGE UP (ref 0–0.5)
EOSINOPHIL NFR BLD AUTO: 5.9 % — SIGNIFICANT CHANGE UP (ref 0–6)
ETHANOL SERPL-MCNC: <3 MG/DL — SIGNIFICANT CHANGE UP (ref 0–10)
GLUCOSE SERPL-MCNC: 119 MG/DL — HIGH (ref 70–99)
HCT VFR BLD CALC: 38.7 % — LOW (ref 39–50)
HGB BLD-MCNC: 12.1 G/DL — LOW (ref 13–17)
IMM GRANULOCYTES NFR BLD AUTO: 0.8 % — SIGNIFICANT CHANGE UP (ref 0–1.5)
LYMPHOCYTES # BLD AUTO: 2.32 K/UL — SIGNIFICANT CHANGE UP (ref 1–3.3)
LYMPHOCYTES # BLD AUTO: 30.4 % — SIGNIFICANT CHANGE UP (ref 13–44)
MCHC RBC-ENTMCNC: 23.9 PG — LOW (ref 27–34)
MCHC RBC-ENTMCNC: 31.3 GM/DL — LOW (ref 32–36)
MCV RBC AUTO: 76.5 FL — LOW (ref 80–100)
MONOCYTES # BLD AUTO: 0.76 K/UL — SIGNIFICANT CHANGE UP (ref 0–0.9)
MONOCYTES NFR BLD AUTO: 9.9 % — SIGNIFICANT CHANGE UP (ref 2–14)
NEUTROPHILS # BLD AUTO: 4.01 K/UL — SIGNIFICANT CHANGE UP (ref 1.8–7.4)
NEUTROPHILS NFR BLD AUTO: 52.5 % — SIGNIFICANT CHANGE UP (ref 43–77)
NRBC # BLD: 0 /100 WBCS — SIGNIFICANT CHANGE UP (ref 0–0)
PLATELET # BLD AUTO: 237 K/UL — SIGNIFICANT CHANGE UP (ref 150–400)
POTASSIUM SERPL-MCNC: 4.4 MMOL/L — SIGNIFICANT CHANGE UP (ref 3.5–5.3)
POTASSIUM SERPL-SCNC: 4.4 MMOL/L — SIGNIFICANT CHANGE UP (ref 3.5–5.3)
PROT SERPL-MCNC: 6.5 G/DL — SIGNIFICANT CHANGE UP (ref 6–8.3)
RBC # BLD: 5.06 M/UL — SIGNIFICANT CHANGE UP (ref 4.2–5.8)
RBC # FLD: 16.9 % — HIGH (ref 10.3–14.5)
SALICYLATES SERPL-MCNC: <1.7 MG/DL — LOW (ref 2.8–20)
SARS-COV-2 RNA SPEC QL NAA+PROBE: SIGNIFICANT CHANGE UP
SODIUM SERPL-SCNC: 140 MMOL/L — SIGNIFICANT CHANGE UP (ref 135–145)
WBC # BLD: 7.64 K/UL — SIGNIFICANT CHANGE UP (ref 3.8–10.5)
WBC # FLD AUTO: 7.64 K/UL — SIGNIFICANT CHANGE UP (ref 3.8–10.5)

## 2022-05-02 PROCEDURE — 93010 ELECTROCARDIOGRAM REPORT: CPT

## 2022-05-02 PROCEDURE — 99285 EMERGENCY DEPT VISIT HI MDM: CPT

## 2022-05-02 NOTE — ED ADULT TRIAGE NOTE - CHIEF COMPLAINT QUOTE
unable to urinate since 3 pm , states hurting himself since yesterday noted with superficial wound to left anterior hand no bleeding

## 2022-05-02 NOTE — ED PROVIDER NOTE - OBJECTIVE STATEMENT
32 y/o male with PMHx intellectual disability, autism, mood disorder, frequent self injury including cutting and head banging presents to ED complaining of urinary rentention. Patient presents to ED after cutting himself stating used butter knife to L hand and L thigh and states "wants to die". Patient reports urinary retention 2/2 BPH PTA since resolved. Patient denies drug or etoh use. Patient denies any symptoms.     UTD tetanus. 30 y/o male with PMHx intellectual disability, autism, mood disorder, frequent self injury including cutting and head banging presents to ED after cutting himself. States he used butter knife to cut L hand and L thigh because he "wants to die". Patient also reports urinary retention prior to arrival 2/2 BPH but that it has since resolved. Patient denies drug or etoh use. Patient denies any symptoms.     UTD tetanus.

## 2022-05-02 NOTE — ED PROVIDER NOTE - CLINICAL SUMMARY MEDICAL DECISION MAKING FREE TEXT BOX
32 y/o male presents to ED after cutting himself and SI in patient with long history self injury, no significant injuries on exam, will screen labs and consult psychiatry. Cutting himself and suicidal ideation in patient with long history self injury. No significant injuries on exam (only superficial abrasions). Will screen labs and consult psychiatry.

## 2022-05-02 NOTE — ED PROVIDER NOTE - NS ED MD DISPO ADMITTING SERVICE
MEDICINE Oxybutynin Counseling:  I discussed with the patient the risks of oxybutynin including but not limited to skin rash, drowsiness, dry mouth, difficulty urinating, and blurred vision.

## 2022-05-02 NOTE — ED PROVIDER NOTE - PROGRESS NOTE DETAILS
bladder scan 130cc Cornelius GARIBAY: Received sign out from Dr. Langford.  Tele-psych attending endorsed.   Pt currently resting, no distress. Group home staff member in attendance. Case dw Dr. Juarez (psych attending) pt is not for psych admission/transfer at this time however will require on going psych evaluation.  Pt now states his urinary retention returned. Bladder scan with 500cc.  Pt will require Sarabia cath winch precludes transfer back to [facility] home.   MAR and Dr. Dotson endorsed.

## 2022-05-02 NOTE — ED PROVIDER NOTE - CARE PLAN
1 Principal Discharge DX:	Injury by cutting and piercing instruments   Principal Discharge DX:	Injury by cutting and piercing instruments  Secondary Diagnosis:	Urinary retention

## 2022-05-02 NOTE — ED PROVIDER NOTE - PHYSICAL EXAMINATION
Superficial cut/abrasion to dorsum L hand, no bony tenderness, laceration or puncture wound.   Superficial abrasion anterior L thigh, no bony tenderness, laceration or puncture wound.

## 2022-05-03 ENCOUNTER — TRANSCRIPTION ENCOUNTER (OUTPATIENT)
Age: 32
End: 2022-05-03

## 2022-05-03 VITALS
HEART RATE: 89 BPM | RESPIRATION RATE: 18 BRPM | SYSTOLIC BLOOD PRESSURE: 123 MMHG | OXYGEN SATURATION: 97 % | DIASTOLIC BLOOD PRESSURE: 86 MMHG | TEMPERATURE: 98 F

## 2022-05-03 DIAGNOSIS — F68.13: ICD-10-CM

## 2022-05-03 DIAGNOSIS — N40.1 BENIGN PROSTATIC HYPERPLASIA WITH LOWER URINARY TRACT SYMPTOMS: ICD-10-CM
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
## 2022-05-03 DIAGNOSIS — Z29.9 ENCOUNTER FOR PROPHYLACTIC MEASURES, UNSPECIFIED: ICD-10-CM
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
## 2022-05-03 DIAGNOSIS — Z91.89 OTHER SPECIFIED PERSONAL RISK FACTORS, NOT ELSEWHERE CLASSIFIED: ICD-10-CM

## 2022-05-03 DIAGNOSIS — K21.9 GASTRO-ESOPHAGEAL REFLUX DISEASE WITHOUT ESOPHAGITIS: ICD-10-CM

## 2022-05-03 DIAGNOSIS — E03.9 HYPOTHYROIDISM, UNSPECIFIED: ICD-10-CM

## 2022-05-03 DIAGNOSIS — F79 UNSPECIFIED INTELLECTUAL DISABILITIES: ICD-10-CM

## 2022-05-03 DIAGNOSIS — R33.9 RETENTION OF URINE, UNSPECIFIED: ICD-10-CM

## 2022-05-03 DIAGNOSIS — J45.909 UNSPECIFIED ASTHMA, UNCOMPLICATED: ICD-10-CM

## 2022-05-03 LAB
ALBUMIN SERPL ELPH-MCNC: 2.9 G/DL — LOW (ref 3.5–5)
ALP SERPL-CCNC: 83 U/L — SIGNIFICANT CHANGE UP (ref 40–120)
ALT FLD-CCNC: 37 U/L DA — SIGNIFICANT CHANGE UP (ref 10–60)
ANION GAP SERPL CALC-SCNC: 6 MMOL/L — SIGNIFICANT CHANGE UP (ref 5–17)
APPEARANCE UR: CLEAR — SIGNIFICANT CHANGE UP
AST SERPL-CCNC: 23 U/L — SIGNIFICANT CHANGE UP (ref 10–40)
BASOPHILS # BLD AUTO: 0.03 K/UL — SIGNIFICANT CHANGE UP (ref 0–0.2)
BASOPHILS NFR BLD AUTO: 0.5 % — SIGNIFICANT CHANGE UP (ref 0–2)
BILIRUB SERPL-MCNC: 0.2 MG/DL — SIGNIFICANT CHANGE UP (ref 0.2–1.2)
BILIRUB UR-MCNC: NEGATIVE — SIGNIFICANT CHANGE UP
BUN SERPL-MCNC: 16 MG/DL — SIGNIFICANT CHANGE UP (ref 7–18)
CALCIUM SERPL-MCNC: 9 MG/DL — SIGNIFICANT CHANGE UP (ref 8.4–10.5)
CHLORIDE SERPL-SCNC: 106 MMOL/L — SIGNIFICANT CHANGE UP (ref 96–108)
CO2 SERPL-SCNC: 28 MMOL/L — SIGNIFICANT CHANGE UP (ref 22–31)
COLOR SPEC: YELLOW — SIGNIFICANT CHANGE UP
CREAT SERPL-MCNC: 0.86 MG/DL — SIGNIFICANT CHANGE UP (ref 0.5–1.3)
DIFF PNL FLD: NEGATIVE — SIGNIFICANT CHANGE UP
EGFR: 118 ML/MIN/1.73M2 — SIGNIFICANT CHANGE UP
EOSINOPHIL # BLD AUTO: 0.48 K/UL — SIGNIFICANT CHANGE UP (ref 0–0.5)
EOSINOPHIL NFR BLD AUTO: 8.3 % — HIGH (ref 0–6)
GLUCOSE SERPL-MCNC: 141 MG/DL — HIGH (ref 70–99)
GLUCOSE UR QL: NEGATIVE — SIGNIFICANT CHANGE UP
HCT VFR BLD CALC: 41.9 % — SIGNIFICANT CHANGE UP (ref 39–50)
HGB BLD-MCNC: 12.8 G/DL — LOW (ref 13–17)
IMM GRANULOCYTES NFR BLD AUTO: 1.4 % — SIGNIFICANT CHANGE UP (ref 0–1.5)
KETONES UR-MCNC: NEGATIVE — SIGNIFICANT CHANGE UP
LEUKOCYTE ESTERASE UR-ACNC: NEGATIVE — SIGNIFICANT CHANGE UP
LYMPHOCYTES # BLD AUTO: 1.88 K/UL — SIGNIFICANT CHANGE UP (ref 1–3.3)
LYMPHOCYTES # BLD AUTO: 32.5 % — SIGNIFICANT CHANGE UP (ref 13–44)
MAGNESIUM SERPL-MCNC: 1.8 MG/DL — SIGNIFICANT CHANGE UP (ref 1.6–2.6)
MCHC RBC-ENTMCNC: 23.7 PG — LOW (ref 27–34)
MCHC RBC-ENTMCNC: 30.5 GM/DL — LOW (ref 32–36)
MCV RBC AUTO: 77.7 FL — LOW (ref 80–100)
MONOCYTES # BLD AUTO: 0.46 K/UL — SIGNIFICANT CHANGE UP (ref 0–0.9)
MONOCYTES NFR BLD AUTO: 8 % — SIGNIFICANT CHANGE UP (ref 2–14)
NEUTROPHILS # BLD AUTO: 2.85 K/UL — SIGNIFICANT CHANGE UP (ref 1.8–7.4)
NEUTROPHILS NFR BLD AUTO: 49.3 % — SIGNIFICANT CHANGE UP (ref 43–77)
NITRITE UR-MCNC: NEGATIVE — SIGNIFICANT CHANGE UP
NRBC # BLD: 0 /100 WBCS — SIGNIFICANT CHANGE UP (ref 0–0)
PH UR: 6.5 — SIGNIFICANT CHANGE UP (ref 5–8)
PHOSPHATE SERPL-MCNC: 3.6 MG/DL — SIGNIFICANT CHANGE UP (ref 2.5–4.5)
PLATELET # BLD AUTO: 230 K/UL — SIGNIFICANT CHANGE UP (ref 150–400)
POTASSIUM SERPL-MCNC: 4.6 MMOL/L — SIGNIFICANT CHANGE UP (ref 3.5–5.3)
POTASSIUM SERPL-SCNC: 4.6 MMOL/L — SIGNIFICANT CHANGE UP (ref 3.5–5.3)
PROT SERPL-MCNC: 6.4 G/DL — SIGNIFICANT CHANGE UP (ref 6–8.3)
PROT UR-MCNC: NEGATIVE — SIGNIFICANT CHANGE UP
RBC # BLD: 5.39 M/UL — SIGNIFICANT CHANGE UP (ref 4.2–5.8)
RBC # FLD: 17 % — HIGH (ref 10.3–14.5)
SODIUM SERPL-SCNC: 140 MMOL/L — SIGNIFICANT CHANGE UP (ref 135–145)
SP GR SPEC: 1.01 — SIGNIFICANT CHANGE UP (ref 1.01–1.02)
TSH SERPL-MCNC: 4.06 UU/ML — SIGNIFICANT CHANGE UP (ref 0.34–4.82)
UROBILINOGEN FLD QL: NEGATIVE — SIGNIFICANT CHANGE UP
VALPROATE SERPL-MCNC: 80 UG/ML — SIGNIFICANT CHANGE UP (ref 50–100)
WBC # BLD: 5.78 K/UL — SIGNIFICANT CHANGE UP (ref 3.8–10.5)
WBC # FLD AUTO: 5.78 K/UL — SIGNIFICANT CHANGE UP (ref 3.8–10.5)

## 2022-05-03 PROCEDURE — 99223 1ST HOSP IP/OBS HIGH 75: CPT

## 2022-05-03 PROCEDURE — 90792 PSYCH DIAG EVAL W/MED SRVCS: CPT

## 2022-05-03 PROCEDURE — 90792 PSYCH DIAG EVAL W/MED SRVCS: CPT | Mod: 95

## 2022-05-03 RX ORDER — CHLORPROMAZINE HCL 10 MG
50 TABLET ORAL EVERY 6 HOURS
Refills: 0 | Status: DISCONTINUED | OUTPATIENT
Start: 2022-05-03 | End: 2022-05-03

## 2022-05-03 RX ORDER — MIRTAZAPINE 45 MG/1
15 TABLET, ORALLY DISINTEGRATING ORAL AT BEDTIME
Refills: 0 | Status: DISCONTINUED | OUTPATIENT
Start: 2022-05-03 | End: 2022-05-03

## 2022-05-03 RX ORDER — SENNA PLUS 8.6 MG/1
2 TABLET ORAL AT BEDTIME
Refills: 0 | Status: DISCONTINUED | OUTPATIENT
Start: 2022-05-03 | End: 2022-05-03

## 2022-05-03 RX ORDER — FINASTERIDE 5 MG/1
5 TABLET, FILM COATED ORAL DAILY
Refills: 0 | Status: DISCONTINUED | OUTPATIENT
Start: 2022-05-03 | End: 2022-05-03

## 2022-05-03 RX ORDER — TAMSULOSIN HYDROCHLORIDE 0.4 MG/1
0.4 CAPSULE ORAL AT BEDTIME
Refills: 0 | Status: DISCONTINUED | OUTPATIENT
Start: 2022-05-03 | End: 2022-05-03

## 2022-05-03 RX ORDER — LIDOCAINE 4 G/100G
5 CREAM TOPICAL ONCE
Refills: 0 | Status: DISCONTINUED | OUTPATIENT
Start: 2022-05-03 | End: 2022-05-03

## 2022-05-03 RX ORDER — FERROUS SULFATE 325(65) MG
325 TABLET ORAL DAILY
Refills: 0 | Status: DISCONTINUED | OUTPATIENT
Start: 2022-05-03 | End: 2022-05-03

## 2022-05-03 RX ORDER — FINASTERIDE 5 MG/1
1 TABLET, FILM COATED ORAL
Qty: 0 | Refills: 0 | DISCHARGE
Start: 2022-05-03

## 2022-05-03 RX ORDER — RISPERIDONE 4 MG/1
2 TABLET ORAL EVERY 12 HOURS
Refills: 0 | Status: DISCONTINUED | OUTPATIENT
Start: 2022-05-03 | End: 2022-05-03

## 2022-05-03 RX ORDER — POLYETHYLENE GLYCOL 3350 17 G/17G
17 POWDER, FOR SOLUTION ORAL DAILY
Refills: 0 | Status: DISCONTINUED | OUTPATIENT
Start: 2022-05-03 | End: 2022-05-03

## 2022-05-03 RX ORDER — PANTOPRAZOLE SODIUM 20 MG/1
40 TABLET, DELAYED RELEASE ORAL
Refills: 0 | Status: DISCONTINUED | OUTPATIENT
Start: 2022-05-03 | End: 2022-05-03

## 2022-05-03 RX ORDER — LEVOTHYROXINE SODIUM 125 MCG
50 TABLET ORAL DAILY
Refills: 0 | Status: DISCONTINUED | OUTPATIENT
Start: 2022-05-03 | End: 2022-05-03

## 2022-05-03 RX ORDER — ALBUTEROL 90 UG/1
2 AEROSOL, METERED ORAL EVERY 6 HOURS
Refills: 0 | Status: DISCONTINUED | OUTPATIENT
Start: 2022-05-03 | End: 2022-05-03

## 2022-05-03 RX ORDER — DIVALPROEX SODIUM 500 MG/1
500 TABLET, DELAYED RELEASE ORAL
Refills: 0 | Status: DISCONTINUED | OUTPATIENT
Start: 2022-05-03 | End: 2022-05-03

## 2022-05-03 RX ORDER — ATORVASTATIN CALCIUM 80 MG/1
10 TABLET, FILM COATED ORAL AT BEDTIME
Refills: 0 | Status: DISCONTINUED | OUTPATIENT
Start: 2022-05-03 | End: 2022-05-03

## 2022-05-03 RX ADMIN — RISPERIDONE 2 MILLIGRAM(S): 4 TABLET ORAL at 06:44

## 2022-05-03 RX ADMIN — FINASTERIDE 5 MILLIGRAM(S): 5 TABLET, FILM COATED ORAL at 15:57

## 2022-05-03 RX ADMIN — DIVALPROEX SODIUM 500 MILLIGRAM(S): 500 TABLET, DELAYED RELEASE ORAL at 06:44

## 2022-05-03 RX ADMIN — Medication 50 MICROGRAM(S): at 06:47

## 2022-05-03 RX ADMIN — PANTOPRAZOLE SODIUM 40 MILLIGRAM(S): 20 TABLET, DELAYED RELEASE ORAL at 06:45

## 2022-05-03 RX ADMIN — Medication 1 MILLIGRAM(S): at 06:44

## 2022-05-03 RX ADMIN — Medication 15 MILLIGRAM(S): at 06:47

## 2022-05-03 RX ADMIN — Medication 1 MILLIGRAM(S): at 15:59

## 2022-05-03 RX ADMIN — Medication 325 MILLIGRAM(S): at 15:57

## 2022-05-03 NOTE — ED BEHAVIORAL HEALTH NOTE - BEHAVIORAL HEALTH NOTE
“Collateral (Name, relationship to patient) has requested that the information provided remain confidential: Yes [  ] No [X]     Collateral (Name, relationship to patient) has provided information that patient is/may be unaware of: Yes [  ] No [X]”        “Patient gives permission to obtain collateral from _____:     (X) Yes     (  )  No        ===================     PRE-HOSPITAL COURSE     ===================     SOURCE: Nurse     DETAILS: Earlier today, the client who has a hx of intellectual disability, Autism and mood disorder, was having difficulty urinating and called EMS on himself as he wanted to go to the Hospital. The client had self-harmed on his hand two days ago with a butter knife; client presents with suicidal ideation, stating that he "wants to die." The client has been going to hospitals 2-3x/day the past several days.    ============     ED COURSE     ============     SOURCE: Nurse and “Chart”     ARRIVAL: In wheelchair by EMS from [facility]     BELONGINGS: Locked up securely      BEHAVIOR: Calm and alert to present surroundings. Urinary retention. 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     ========================     PATIENT DEMOGRAPHICS: 30 y/o  male domiciled at group [facility] home     ========================

## 2022-05-03 NOTE — PATIENT PROFILE ADULT - PATIENT'S SEXUAL ORIENTATION
Heterosexual Odomzo Counseling- I discussed with the patient the risks of Odomzo including but not limited to nausea, vomiting, diarrhea, constipation, weight loss, changes in the sense of taste, decreased appetite, muscle spasms, and hair loss.  The patient verbalized understanding of the proper use and possible adverse effects of Odomzo.  All of the patient's questions and concerns were addressed.

## 2022-05-03 NOTE — BH CONSULTATION LIAISON ASSESSMENT NOTE - HPI (INCLUDE ILLNESS QUALITY, SEVERITY, DURATION, TIMING, CONTEXT, MODIFYING FACTORS, ASSOCIATED SIGNS AND SYMPTOMS)
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, ADHD with long h/o self-injury (cutting, head banging) but no known SAs, h/o endorsing SI but then retracting, long h/o aggression vs staff and fellow residents at group home, h/o making allegations against residence staff members which (per chart notes) have never been substantiated on investigation, multiple past psychiatric admissions (most recent [facility] 12/19-12/23/2021), well-known to ED/CL psych @[facility] including writer from very frequent ED presentations/admissions, and MHx of asthma, HTN, dyslipidemia, DM, enuresis, urinary retention, GERD, BPH, hypothyroidism and b/l myopia, BIB self-activated EMS from residence with c/o SIB (trying to stab himself with a butter knife) and urinary retention. Of note, pt initially able to void on arrival to ED. On initial eval by ED telepsych, pt seemed intent on being admitted to IP psychiatry or medicine; after being informed that he would likely be psych cleared to return to his residence, pt reported he was unable to void and needed Sarabia catheter, triggering admission to medicine as pt could not be DC'd back to residence with Sarabia. Pt remained in ED holding O/N on CO 1:1. CL f/u was requested today to reassess for SI. On MD arrival, pt is in process of having Sarabia catheter removed for TOV. In the next few minutes, he voids a modest amount in urinal (about 200 cc total), and is encouraged to continue drinking water with goal of more substantial void. Pt tells MD, "I'm not suicidal anymore. I want to go back home [to the residence] because tomorrow is my birthday," adding that he is looking forward to receiving cake (strawberry cheesecake) and presents there. When asked about his self-injury, pt reports (in contrast to what he told ED staff) that he stabbed himself with a pen, not a knife. He shows MD superficial scratches on the dorsum of his L hand and the top of his L thigh which have already begun to heal. When asked why he did it, pt changes his story twice in quick succession, first saying he was "stressed" talking to his ex-girlfriend [who per chart broke up with pt about 2 yrs ago], then saying it was stress from visiting his mother and stepfather in [place]. Pt insists that in any event, he no longer wants to harm himself and very much wants to return to the residence. Pt then asks MD to "get me into an exercise program," explaining that he now weighs 259 lb and wants to lose weight. Pt returns to perseverating on DC and repeatedly asks hospital staff to call his residence to arrange pickup this evening so he will not have to wait until tomorrow morning.

## 2022-05-03 NOTE — DISCHARGE NOTE PROVIDER - HOSPITAL COURSE
Patient is 31 year old single, disabled male, non-caregiver, domiciled in at [facility], with past psychiatric history of moderate intellectual disability, autism spectrum disorder, personality disorders, Impulse control disorders, factitious disorder, HAVEN, mood disorders (MDD, Bipolar, DMDD, Adjsutment), PTSD and ADHD, multiple past psychiatric admissions, past medical history of asthma, dyslipidemia, enuresis, urinary retention, GERD, BPH, hypothyroidism and b/l myopia, who presented to the ED with attempt of Suicide, he says he stabbed himself with a knife in the L hand and thigh, also endorses urinary retention. As per ED provider, patient was able to void when he first got to the ED. Patient states that he has BPH and has been "catheterized 26 times", also endorses dysuria and states that recently he was at [facility] and had an infection in the left testicle, one of the managers from his home states he had an UTI. The Patient was seen by tele psych while in the ED and said to hold patient in ED.  dr Dumas consulted and interview the patient. Patient denies suicidal ideations or homicidal ideations and patient was cleared for discharge to group home. Urology was consulted for urinary retention, indwelling catheter was removed and patient passed trial of void with 300ml urine output. SW consulted for safe discharge to group home.    Given patient's improved clinical status and current hemodynamic stability, decision was made to discharge.  Please refer to patient's complete medical chart with documents for a full hospital course, for this is only a brief summary.

## 2022-05-03 NOTE — DISCHARGE NOTE NURSING/CASE MANAGEMENT/SOCIAL WORK - NSDCVIVACCINE_GEN_ALL_CORE_FT
Tdap; 20-Dec-2018 12:21; Lo Anaya (RN); [employer]; [id number] (Exp. Date: 02-Nov-2020); IntraMuscular; Deltoid Left.; 0.5 milliLiter(s); VIS (VIS Published: 09-May-2013, VIS Presented: 20-Dec-2018);   Tdap; 26-Mar-2019 18:59; Shavon Barrios (RN); [employer]; [id number] (Exp. Date: 26-Feb-2021); IntraMuscular; Deltoid Left.; 0.5 milliLiter(s); VIS (VIS Published: 09-May-2013, VIS Presented: 26-Mar-2019);   Tdap; 01-Dec-2021 09:35; Zamzam Coulter (RN); [employer]; [id number] (Exp. Date: 09-Sep-2023); IntraMuscular; Deltoid Left.; 0.5 milliLiter(s); VIS (VIS Published: 09-May-2013, VIS Presented: 01-Dec-2021);

## 2022-05-03 NOTE — BH CONSULTATION LIAISON ASSESSMENT NOTE - SUMMARY
31M, single, living in 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, ADHD with long h/o self-injury (cutting, head banging) but no known SAs, h/o endorsing SI but then retracting, long h/o aggression vs staff and fellow residents at group home, h/o making allegations against residence staff members which (per chart notes) have never been substantiated on investigation, multiple past psychiatric admissions (most recent [facility] 12/19-12/23/2021), well-known to ED/CL psych @[facility] including writer from very frequent ED presentations/admissions, and MHx of asthma, HTN, dyslipidemia, DM, enuresis, urinary retention, GERD, BPH, hypothyroidism and b/l myopia, BIB self-activated EMS from residence with c/o SIB (trying to stab himself with a butter knife) and urinary retention. Of note, pt initially able to void on arrival to ED. On initial eval by ED telepsych, pt seemed intent on being admitted to IP psychiatry or medicine; after being informed that he would likely be psych cleared to return to his residence, pt reported he was unable to void and needed Sarabia catheter, triggering admission to medicine as pt could not be DC'd back to residence with Sarabia. Pt remained in ED holding O/N on CO 1:1. CL f/u was requested today to reassess for SI. On exam, pt presents as in prior encounters appearing euthymic and denying SI/HI/AVH, attributing his reported self-injury to "stress" and asking to return to his residence so he can celebrate his birthday tomorrow. Dx impression c/w pt's reported PHx. Pt is now psych cleared for SI, as his behavior seems highly c/w well-documented h/o real or purported self-injury as a means of seeking attention and care in the hospital. Pt appears most likely to benefit from returning to his residence as soon as it is feasible to do so. Pt does not appear to present an acute risk of harm to self or others at the time of assessment, and does not appear to be in need of admission to IP psych at the time of assessment.

## 2022-05-03 NOTE — DISCHARGE NOTE PROVIDER - NSDCCPCAREPLAN_GEN_ALL_CORE_FT
PRINCIPAL DISCHARGE DIAGNOSIS  Diagnosis: Urinary retention  Assessment and Plan of Treatment: Patient admitted for suicidal ideation and urinary retention without truly retaining urine. Sarabia catheter was inserted and drained clear yellow urine and UA was negative for infection. Urology was consulted with recommendation for trial of void. Patient catheter was removed and patient was able to pass urine w/o difficulty.  Patient was evaluated and decision was made to discharge patient back to group home.  Please follow up with his primary doctor and urologist after discharge.      SECONDARY DISCHARGE DIAGNOSES  Diagnosis: Suicide and self-inflicted injury  Assessment and Plan of Treatment: Patient was admitted for suicidal ideations at group [place]. Patient was admitted to hospital and put on 1:1 observation. Behavioral healf dr. Dumas was consulted for recommendations. Patient denies suicidal or homicidal ideations and was cleared for discharge back to group home.  Patient has extensive psychiatric history including of moderate intellectual disability, autism spectrum disorder, personality disorders, Impulse control disorders, factitious disorder, HAVEN, mood disorders (MDD, Bipolar, DMDD, Adjsutment), PTSD and ADHD,  Please follow up with your Psychiatrist and take your medications as ordered.

## 2022-05-03 NOTE — DISCHARGE NOTE PROVIDER - CARE PROVIDER_API CALL
Gus Birmingham (MD)  Family Medicine  [address] [place] [address]  Phone: [phone]  Fax: [phone]  Established Patient  Follow Up Time: 1-3 days

## 2022-05-03 NOTE — ED BEHAVIORAL HEALTH ASSESSMENT NOTE - NSTXRELFACTOR_PSY_ALL_CORE
Change in provider or treatment (i.e., medications, psychotherapy, milieu)/Recent inpatient discharge Hopeless about or dissatisfied with provider or treatment

## 2022-05-03 NOTE — H&P ADULT - NSHPPHYSICALEXAM_GEN_ALL_CORE
GENERAL: NAD, well-groomed, well-developed  HEAD:  Atraumatic, Normocephalic  EYES: EOMI, PERRLA, conjunctiva and sclera clear  ENMT: No tonsillar erythema, exudates, or enlargement; Moist mucous membranes, Good dentition, No lesions  NECK: Supple, normal appearance, No JVD; Normal thyroid; Trachea midline  NERVOUS SYSTEM:  Alert & Oriented X3,  Motor Strength 5/5 B/L upper and lower extremities, sensation intact  CHEST/LUNG: Lungs clear to auscultation bilaterally, No rales, rhonchi, wheezing   HEART: Regular rate and rhythm; No murmurs, rubs, or gallops  ABDOMEN: Soft, Nontender, Nondistended; Bowel sounds present  EXTREMITIES:  2+ Peripheral Pulses, No clubbing, cyanosis, or edema  LYMPH: No lymphadenopathy noted  SKIN: No rashes or lesions;  Good capillary refill GENERAL: NAD, well-groomed, well-developed  HEAD:  Atraumatic, Normocephalic  EYES: EOMI, PERRLA, conjunctiva and sclera clear  ENMT: No tonsillar erythema, exudates, or enlargement; Moist mucous membranes, Good dentition, No lesions  NECK: Supple, normal appearance, No JVD; Normal thyroid; Trachea midline  NERVOUS SYSTEM:  Alert & Oriented X3,  Motor Strength 5/5 B/L upper and lower extremities, sensation intact  CHEST/LUNG: Lungs clear to auscultation bilaterally, No rales, rhonchi, wheezing   HEART: Regular rate and rhythm; No murmurs, rubs, or gallops  ABDOMEN: Soft, Nontender, Nondistended; Bowel sounds present  : no testicular swelling, or erythema, some tenderness on palpation  EXTREMITIES: +swelling and wound with dry blood in left hand, multiple small wounds in the left thigh. 2+ Peripheral Pulses, No clubbing, cyanosis, or edema  LYMPH: No lymphadenopathy noted  SKIN: wound in left hand and left thigh

## 2022-05-03 NOTE — ED BEHAVIORAL HEALTH ASSESSMENT NOTE - CURRENT MEDICATION
unclear, per last d/c summary (December 2021)  VPA 500mg BID  risperdal 1mg BID  ativan 1mg TID  guanfacine As per psyckes; Ativan 1 mg TID, Buspar 15 mg BID, Depakote 500 mg BID, Remeron 15 mg HS, Risperdal 2 mg BID, Cogentin 1 mg BID, Finasteride 5 mg daily, Pravastatin 20 mg daily, Tamsulosin 0.4 mg HS, Polyethylene glycol daily, Levothyroxine 50 mcg daily, Albuterol PRN, Carbamide Peroxide otic drops and PRN Ibuprofen.

## 2022-05-03 NOTE — H&P ADULT - NSHPREVIEWOFSYSTEMS_GEN_ALL_CORE
CONSTITUTIONAL: No fever, weight loss, or fatigue  EYES: No eye pain, visual disturbances, or discharge  ENT:  No difficulty hearing, tinnitus, vertigo; No sinus or throat pain  NECK: No pain or stiffness  RESPIRATORY: No cough, wheezing, chills or hemoptysis; No Shortness of Breath  CARDIOVASCULAR: No chest pain, palpitations, passing out, dizziness, or leg swelling  GASTROINTESTINAL: No abdominal or epigastric pain. No nausea, vomiting, or hematemesis; No diarrhea or constipation. No melena or hematochezia.  GENITOURINARY: + Urinary retention  NEUROLOGICAL: No headaches, memory loss, loss of strength, numbness, or tremors  SKIN: No itching, burning, rashes, or lesions   LYMPH Nodes: No enlarged glands  ENDOCRINE: No heat or cold intolerance; No hair loss  MUSCULOSKELETAL: No joint pain or swelling; No muscle, back, No extremity pain  PSYCHIATRIC: + depression, anxiety, mood swings, + SI, no difficulty sleeping  HEME/LYMPH: No easy bruising, or bleeding gums  ALLERGY AND IMMUNOLOGIC: No hives or eczema CONSTITUTIONAL: No fever, weight loss, or fatigue  EYES: No eye pain, visual disturbances, or discharge  ENT:  No difficulty hearing, tinnitus, vertigo; No sinus or throat pain  NECK: No pain or stiffness  RESPIRATORY: No cough, wheezing, chills or hemoptysis; No Shortness of Breath  CARDIOVASCULAR: No chest pain, palpitations, passing out, dizziness, or leg swelling  GASTROINTESTINAL: No abdominal or epigastric pain. No nausea, vomiting, or hematemesis; No diarrhea or constipation. No melena or hematochezia.  GENITOURINARY: + Urinary retention, dysuria  NEUROLOGICAL: No headaches, memory loss, loss of strength, numbness, or tremors  SKIN: No itching, burning, rashes, or lesions  LYMPH Nodes: No enlarged glands  ENDOCRINE: No heat or cold intolerance; No hair loss  MUSCULOSKELETAL: No joint pain or swelling; No muscle, back, No extremity pain  PSYCHIATRIC: + depression, anxiety, mood swings, + SI, no difficulty sleeping  HEME/LYMPH: No easy bruising, or bleeding gums  ALLERGY AND IMMUNOLOGIC: No hives or eczema

## 2022-05-03 NOTE — ED BEHAVIORAL HEALTH ASSESSMENT NOTE - OTHER
No reported h/o serious violence   staff peer concrete unable to assess not observed agitated, demanding, regressed and manipulative  staff, Lawanda inpatient admission oriented w/ concern for disingenuous history provision peers Records Checked-No Data: [facility] Inpatient, [facility] Outpatient, [facility] Inpatient, [facility] E&A, [facility] Inpatient, [facility] ED, , [facility] Inpatient, [facility] CL, [facility] Manager, Social Media (For example - Facebook, [employer], [employer]), Web search, Forensic Databases.

## 2022-05-03 NOTE — H&P ADULT - HISTORY OF PRESENT ILLNESS
31 year old single, disabled male, non-caregiver, domiciled in at [facility], with past psychiatric history of moderate intellectual disability, autism spectrum disorder, personality disorders, Impulse control disorders, factitious disorder, HAVEN, mood disorders (MDD, Bipolar, DMDD, Adjsutment), PTSD and ADHD, multiple past psychiatric admissions, past medical history of asthma, dyslipidemia, enuresis, urinary retention, GERD, BPH, hypothyroidism and b/l myopia, who presented to the ED with attempt of Suicide, he says he stabbed himself with a butterknife, also endoreses urinary retention. As per ED provider, patient was able to void when he first got to the ED. Patient was seen by tele psych while in the ED. As per ED patient can't go back with the home or inpatient psych with a mckeon catheter.  31 year old single, disabled male, non-caregiver, domiciled in at [facility], with past psychiatric history of moderate intellectual disability, autism spectrum disorder, personality disorders, Impulse control disorders, factitious disorder, HAVEN, mood disorders (MDD, Bipolar, DMDD, Adjsutment), PTSD and ADHD, multiple past psychiatric admissions, past medical history of asthma, dyslipidemia, enuresis, urinary retention, GERD, BPH, hypothyroidism and b/l myopia, who presented to the ED with attempt of Suicide, he says he stabbed himself with a knife in the L hand and thigh, also endoreses urinary retention. As per ED provider, patient was able to void when he first got to the ED. Patient states that he has BPH and has been "catheterized 26 times", also endorses dysuria and states that recently he was at [facility] and had an infection in the left testicle, one of the managers from his home states he had an UTI. The Patient was seen by tele psych while in the ED and said to hold patient in ED. As per ED patient can't go back with the home or inpatient psych with a mckeon catheter, so has to be admitted.

## 2022-05-03 NOTE — CONSULT NOTE ADULT - ASSESSMENT
31 year old male with possible urinary retention  UA negative for infection, no leukocytosis, normal creatinine, vital signs stable  Questionable PMH of BPH, unclear how diagnosed and uncommon at this age to cause retention. On Flomax    - may attempt TOV  - please obtain PVR bladder scan to assess if patient able to empty bladder  - if fails TOV will need to continue Sarabia catheter  - recommend outpatient urology follow up to assess for cause of retention   - psych follow up  - continue medical management  - will discuss with Dr. Lutz

## 2022-05-03 NOTE — ED BEHAVIORAL HEALTH ASSESSMENT NOTE - SUMMARY
This is a 31 year old single, disabled male, non-caregiver, domiciled in at [facility], with past psychiatric history of moderate intellectual disability, autism spectrum disorder, personality disorders, Impulse control disorders, conduct disorder, factitious disorder, HAVEN, mood disorders (MDD, Bipolar, DMDD, Adjustment), PTSD and ADHD, [facility] frequent ED presenter well known to [facility] psychiatry, longstanding history of self-injurious behaviors (cutting/stabbing self w/ plastic utensils, head banging) usually conveyed by patient as suicidal in nature, no known actual suicide attempts, history of reporting then retracting SI (per psyckes; pt had 86 events in the last 5 years related to SI), longstanding history of aggression at the  (towards staff & other residents) in addition to making allegations against residence staff (which have never been substantiated on investigation), no pertinent substance use history, and past medical history of asthma, HTN, dyslipidemia, DM, enuresis, urinary retention, GERD, BPH, hypothyroidism and b/l myopia who presents to the ED BIB self activated EMS accompanied by residence staff with patient endorsing urinary retention and engaging in self injurious behaviors since yesterday. He reportedly attempted to stab himself with a butter knife in the . His psychiatric assessment is limited by patient's concrete thinking and perseverations on being admitted either medically or psychiatrically. He readily exhibits an objective desire for any form of admission likely for primary gain of being in the sick role or secondary gain of evading chronic discomforts with his residence as previously noted by his history of factitious disorder. His reports are concerning for disingenuous history provision towards this goal and thus it is difficult to definitively rule in or rule out suicidality at this time. Ideally, detailed collateral and reassessment in the morning would be helpful to clarify if patient has exhibited signs suggestive of mood/behavior decompensation from his chronically behaviorally dysregulated baseline. However, once informed of lack of overnight bed availability, patient is now conveying ongoing symptoms of urinary retention, insisting on medical admission and is noted to have 500 cc of urine in his bladder (on subsequent bladder scan) that he insists he is unable to void. If he is admitted medically, recommend continued one to one observation on the unit with psychiatric consultation to continue in assessing his psychiatric safety and disposition needs.

## 2022-05-03 NOTE — BH CONSULTATION LIAISON ASSESSMENT NOTE - CURRENT MEDICATION
MEDICATIONS  (STANDING):  atorvastatin 10 milliGRAM(s) Oral at bedtime  busPIRone 15 milliGRAM(s) Oral every 12 hours  diVALproex  milliGRAM(s) Oral two times a day  ferrous    sulfate 325 milliGRAM(s) Oral daily  finasteride 5 milliGRAM(s) Oral daily  levothyroxine 50 MICROGram(s) Oral daily  LORazepam     Tablet 1 milliGRAM(s) Oral three times a day  mirtazapine 15 milliGRAM(s) Oral at bedtime  pantoprazole    Tablet 40 milliGRAM(s) Oral before breakfast  polyethylene glycol 3350 17 Gram(s) Oral daily  risperiDONE   Tablet 2 milliGRAM(s) Oral every 12 hours  senna 2 Tablet(s) Oral at bedtime  tamsulosin 0.4 milliGRAM(s) Oral at bedtime    MEDICATIONS  (PRN):  ALBUTerol    90 MICROgram(s) HFA Inhaler 2 Puff(s) Inhalation every 6 hours PRN Shortness of Breath and/or Wheezing  chlorproMAZINE    Injectable 50 milliGRAM(s) IntraMuscular every 6 hours PRN AGITATION

## 2022-05-03 NOTE — ED BEHAVIORAL HEALTH ASSESSMENT NOTE - NSACTIVEVENT_PSY_ALL_CORE
dislikes gh/Triggering events leading to humiliation, shame, and/or despair (e.g., Loss of relationship, financial or health status) (real or anticipated) Chart hx of pt disliking his GH/Triggering events leading to humiliation, shame, and/or despair (e.g., Loss of relationship, financial or health status) (real or anticipated)

## 2022-05-03 NOTE — H&P ADULT - PROBLEM SELECTOR PLAN 2
Pt with injury on hand and thigh after stabbing himself with a knife  PSych consulted  continue home meds  1-1 observation

## 2022-05-03 NOTE — ED BEHAVIORAL HEALTH ASSESSMENT NOTE - HPI (INCLUDE ILLNESS QUALITY, SEVERITY, DURATION, TIMING, CONTEXT, MODIFYING FACTORS, ASSOCIATED SIGNS AND SYMPTOMS)
This is a 31 year old single, disabled male, non-caregiver, domiciled in at [facility], with past psychiatric history of moderate intellectual disability, autism spectrum disorder, personality disorders, Impulse control disorders, factitious disorder, HAVEN, mood disorders (MDD, Bipolar, DMDD, Adjsutment), PTSD and ADHD, multiple past psychiatric admissions (last known to [facility] 12/19-12/23/2021), frequent ED presenter well known to [facility] psychiatry, longstanding history of self-injurious behaviors (cutting/stabbing self w/ plastic utensils, head banging) usually conveyed by patient as suicidal in nature, no known actual suicide attempts, history of reporting then retracting SI, longstanding history of aggression at the  (towards staff & other residents) in addition to making allegations against residence staff (which have never been substantiated on investigation), no pertinent substance use history, and past medical history of asthma, dyslipidemia, enuresis, urinary retention, GERD, BPH, hypothyroidism and b/l myopia who presents to the ED BIB EMS accompanied by residence staff with patient endorsing urinary retention and engaging in self injurious behaviors since yesterday. He reportedly attempted to stab himself with a butter knife in the . Psychiatry consulted for evaluation.     On assessment, patient relays "I stabbed myself 4 times" elaborating that he did this because "I'm feeling suicidal" and he has been feeling this way for "2 weeks." He tells me "my meds are not working for me" and reports being on "psych" meds but can not recall the names. He tells me "I want to go to the psych wright" spontaneously relays engaging in self injury today, "yesterday and the day before." He reports being evaluated those times also at [place] and [facility] and tells me that they just sent him home then and that he told them he would keep hurting himself. He relays his mood has been "depressed, lonely, sad, angry." He reports he's "not sleeping good" and relays his energy and appetite have also been "not good." He also relays having had thoughts of hurting others stating "I'm mad, I want to punch someone in the face." He denies having thoughts of killing anyone other than "me." He also relays "I've been drinking, I've been vaping" relaying vaping "cigarettes" and drinking "3 to 7 beers" on occasion, last occurring "last week Tuesday." On cognitive exam, he relays the date as "the first" of "May" and is otherwise unaware of the day of the week. He otherwise perseverates on being admitted, noting that the stretcher is uncomfortable and inquires about the various possibilities for admission. He insists "when I go home I'm gonna hurt myself" and "I need to come in." He also tells me that he can not wait until morning, asks if he can go to the medical floor "upstairs" instead and, when informed there would need to be a medical reason for this, he relays "I can not pee." However, he also relays having urinated since ED arrival and denies any other physical symptoms. He resumes perseverating on "I need to go inpatient" and expressions of recent self harm behaviors.     COVID Exposure Screen- Patient  Have you had a COVID-19 test in the last 90 days? Unable to assess   Have you tested positive for COVID-19 antibodies? Unable to assess   Have you received 2 doses of the COVID-19 vaccine? Yes, reports receiving 3 doses.   In the past 10 days, have you been around anyone with a positive COVID-19 test? No  Have you been out of [place] within the past 10 days? No    Hospital Course from last psychiatric admissions reviewed as such:   Admission psychiatric diagnosis validated during hospital course?: no, diagnosis changed... Details: Adjustment d/o, ASD, ID, HAVEN, ADHD; r/o schizoaffective d/o, bipolar type  During admission, no overt psychosis observed with mood dysregulation specific to needs not being met right away. Pt retracts SI/HI statements made on admission, reports statements made 2/2 frustration of his breakup and unemployment status/financial limitations. His limited impulse control and  frustration tolerance appears primarily secondary to ASD/ID with fair response to setting strict limit setting and behavioral expectations.  Psychiatric Medications Ordered and Response: Topamax discontinued, restarted on loading dose of depakote and continued with depakote 500 mg bid with vpa level pending. Started on risperdal 1 mg bid. Titrated ativan 1 mg tid. Continued with home medications- guanfacine 1 mg bid, buspar 15 mg bid, mirtazapine 15 mg qhs. Pt tolerated medications well, denied negative side effects.  Psychosocial/Other Therapies Provided: Patient was provided with milieu therapy as well as daily supportive psychotherapy. Patient has been actively engaged in treatment, attending several groups. Patient has attended group related to illness management and recovery as well as DBT crisis skill group. Reinforced positive coping skills learned on the unit, ie STOP skill.  Overall Patient Response to Psychiatric Treatment: Pt has made significant progress over the course of hospitalization. With continuous psychotherapy from the treatment team and medications adjustment, patient reports feeling better with improved sleep and appetite. Pt's limited impulse control and  frustration tolerance appears primarily secondary to ASD/ID with fair response to setting strict limit setting and behavioral expectations, and pt with improved behavioral control. Patient denied any suicidal or homicidal ideations. Patient denied any auditory or visual hallucinations with no overt psychosis noted during admission. Pt was evaluated by treatment team, pt is stable for discharge and patient shows no imminent danger to self, others or property at this time. Pt understands and agrees with treatment plan and following up with outpatient. Psychoeducation provided regarding diagnosis, medications, treatment and follow up. Risks, benefits, alternatives discussed, all questions and concerns addressed and answered.  Significant Behavioral Events: Pt had several episodes of agitation 2/2 needs not being met right away requiring seclusion room and IM PRN medications.  Episodes of Restraint/Seclusion: Had 1 episode of seclusion room 2/2 danger to self and others  Medical Problems and Treatment: Pt c/o chest pain, medicine consulted and "ruled out for ACS... EKG reviewed with no acute changes and similar to prior EKG" This is a 31 year old single, disabled male, non-caregiver, domiciled in at [facility], with past psychiatric history of moderate intellectual disability, autism spectrum disorder, personality disorders, Impulse control disorders, conduct disorder, factitious disorder, HAVEN, mood disorders (MDD, Bipolar, DMDD, Adjustment), PTSD and ADHD, multiple past psychiatric admissions (last known to [facility] 12/19-12/23/2021), frequent ED presenter well known to [facility] psychiatry, longstanding history of self-injurious behaviors (cutting/stabbing self w/ plastic utensils, head banging) usually conveyed by patient as suicidal in nature, no known actual suicide attempts, history of reporting then retracting SI (per psyckes; pt had 86 events in the last 5 years related to SI), longstanding history of aggression at the  (towards staff & other residents) in addition to making allegations against residence staff (which have never been substantiated on investigation), no pertinent substance use history, and past medical history of asthma, HTN, dyslipidemia, DM, enuresis, urinary retention, GERD, BPH, hypothyroidism and b/l myopia who presents to the ED BIB self activated EMS accompanied by residence staff with patient endorsing urinary retention and engaging in self injurious behaviors since yesterday. He reportedly attempted to stab himself with a butter knife in the . Psychiatry consulted for evaluation.     On assessment, patient relays "I stabbed myself 4 times" elaborating that he did this because "I'm feeling suicidal" and he has been feeling this way for "2 weeks." He tells me "my meds are not working for me" and reports being on "psych" meds but can not recall the names. He tells me "I want to go to the psych wright" spontaneously relays engaging in self injury today, "yesterday and the day before." He reports being evaluated those times also at [place] and [facility] and tells me that they just sent him home then and that he told them he would keep hurting himself. He relays his mood has been "depressed, lonely, sad, angry." He reports he's "not sleeping good" and relays his energy and appetite have also been "not good." He also relays having had thoughts of hurting others stating "I'm mad, I want to punch someone in the face." He denies having thoughts of killing anyone other than "me." He also relays "I've been drinking, I've been vaping" relaying vaping "cigarettes" and drinking "3 to 7 beers" on occasion, last occurring "last week Tuesday." On cognitive exam, he relays the date as "the first" of "May" and is otherwise unaware of the day of the week. He otherwise perseverates on being admitted, noting that the stretcher is uncomfortable and inquires about the various possibilities for admission. He insists "when I go home I'm gonna hurt myself" and "I need to come in." He also tells me that he can not wait until morning, asks if he can go to the medical floor "upstairs" instead and, when informed there would need to be a medical reason for this, he relays "I can not pee." However, he also relays having urinated since ED arrival and denies any other physical symptoms. He resumes perseverating on "I need to go inpatient" and expressions of recent self harm behaviors.     COVID Exposure Screen- Patient  Have you had a COVID-19 test in the last 90 days? Unable to assess   Have you tested positive for COVID-19 antibodies? Unable to assess   Have you received 2 doses of the COVID-19 vaccine? Yes, reports receiving 3 doses.   In the past 10 days, have you been around anyone with a positive COVID-19 test? No  Have you been out of [place] within the past 10 days? No    Hospital Course from last psychiatric admissions reviewed as such:   Admission psychiatric diagnosis validated during hospital course?: no, diagnosis changed... Details: Adjustment d/o, ASD, ID, HAVEN, ADHD; r/o schizoaffective d/o, bipolar type  During admission, no overt psychosis observed with mood dysregulation specific to needs not being met right away. Pt retracts SI/HI statements made on admission, reports statements made 2/2 frustration of his breakup and unemployment status/financial limitations. His limited impulse control and  frustration tolerance appears primarily secondary to ASD/ID with fair response to setting strict limit setting and behavioral expectations.  Psychiatric Medications Ordered and Response: Topamax discontinued, restarted on loading dose of depakote and continued with depakote 500 mg bid with vpa level pending. Started on risperdal 1 mg bid. Titrated ativan 1 mg tid. Continued with home medications- guanfacine 1 mg bid, buspar 15 mg bid, mirtazapine 15 mg qhs. Pt tolerated medications well, denied negative side effects.  Psychosocial/Other Therapies Provided: Patient was provided with milieu therapy as well as daily supportive psychotherapy. Patient has been actively engaged in treatment, attending several groups. Patient has attended group related to illness management and recovery as well as DBT crisis skill group. Reinforced positive coping skills learned on the unit, ie STOP skill.  Overall Patient Response to Psychiatric Treatment: Pt has made significant progress over the course of hospitalization. With continuous psychotherapy from the treatment team and medications adjustment, patient reports feeling better with improved sleep and appetite. Pt's limited impulse control and  frustration tolerance appears primarily secondary to ASD/ID with fair response to setting strict limit setting and behavioral expectations, and pt with improved behavioral control. Patient denied any suicidal or homicidal ideations. Patient denied any auditory or visual hallucinations with no overt psychosis noted during admission. Pt was evaluated by treatment team, pt is stable for discharge and patient shows no imminent danger to self, others or property at this time. Pt understands and agrees with treatment plan and following up with outpatient. Psychoeducation provided regarding diagnosis, medications, treatment and follow up. Risks, benefits, alternatives discussed, all questions and concerns addressed and answered.  Significant Behavioral Events: Pt had several episodes of agitation 2/2 needs not being met right away requiring seclusion room and IM PRN medications.  Episodes of Restraint/Seclusion: Had 1 episode of seclusion room 2/2 danger to self and others  Medical Problems and Treatment: Pt c/o chest pain, medicine consulted and "ruled out for ACS... EKG reviewed with no acute changes and similar to prior EKG"

## 2022-05-03 NOTE — ED BEHAVIORAL HEALTH ASSESSMENT NOTE - DETAILS
H/o physical aggression, altercations with fellow [facility] home residents, residence staff GH staff at bedside to be updated by ED staff zyprexa - rash per chart per ED provider; pt to be admitted medically for urinary retention. pt responds to the above questions as noted and states he "tried to cut my throat" some time in the "last 2 weeks." Chart hx of prior trauma & PTSD (details unknown) Per chart, previous rxn to Zyprexa (rash) Per chart; pt has history of physical aggression, altercations with fellow group home residents, residence staff

## 2022-05-03 NOTE — ED BEHAVIORAL HEALTH ASSESSMENT NOTE - VIOLENCE RISK FACTORS:
Impulsivity/Irritability Violent ideation/threat/speech/Impulsivity/History of being victimized/traumatized/Community stressors that increase the risk of destabilization/Irritability/Elopement history or risk

## 2022-05-03 NOTE — DISCHARGE NOTE PROVIDER - NSDCMRMEDTOKEN_GEN_ALL_CORE_FT
albuterol 90 mcg/inh inhalation aerosol: 2 puff(s) inhaled every 6 hours, As needed, Shortness of Breath and/or Wheezing  busPIRone 15 mg oral tablet: 1 tab(s) orally every 12 hours  divalproex sodium 500 mg oral delayed release tablet: 1 tab(s) orally 2 times a day  ferrous sulfate 325 mg (65 mg elemental iron) oral tablet: 1 tab(s) orally once a day  finasteride 5 mg oral tablet: 1 tab(s) orally once a day  levothyroxine 50 mcg (0.05 mg) oral tablet: 1 tab(s) orally once a day in AM  loperamide 2 mg oral tablet, chewable: 1 tab(s) chewed 2 times a day, As Needed -for diarrhea   LORazepam 1 mg oral tablet: 1 tab(s) orally 3 times a day  MiraLax oral powder for reconstitution: 17 gram(s) orally once a day, As Needed -for congestion - for constipation   mirtazapine 15 mg oral tablet: 1 tab(s) orally once a day (at bedtime)  pantoprazole 20 mg oral delayed release tablet: 1 tab(s) orally 2 times a day  pravastatin 20 mg oral tablet: 1 tab(s) orally once a day  risperiDONE 2 mg oral tablet: 1 tab(s) orally every 12 hours  senna oral tablet: 2 tab(s) orally once a day (at bedtime)   tamsulosin 0.4 mg oral capsule: 1 cap(s) orally once a day (at bedtime)

## 2022-05-03 NOTE — BH CONSULTATION LIAISON ASSESSMENT NOTE - NSACTIVEVENT_PSY_ALL_CORE
Chart hx of pt disliking his GH/Triggering events leading to humiliation, shame, and/or despair (e.g., Loss of relationship, financial or health status) (real or anticipated)

## 2022-05-03 NOTE — BH CONSULTATION LIAISON ASSESSMENT NOTE - NSBHSAALC_PSY_A_CORE FT
Inconsistent reports (sometimes reports drinking up to 7 beers a day, other times states he stopped drinking in 2020)

## 2022-05-03 NOTE — DISCHARGE NOTE NURSING/CASE MANAGEMENT/SOCIAL WORK - PATIENT PORTAL LINK FT
You can access the FollowMyHealth Patient Portal offered by [facility] by registering at the following website: [email or web address]. By joining [employer]’s FollowMyHealth portal, you will also be able to view your health information using other applications (apps) compatible with our system.

## 2022-05-03 NOTE — CONSULT NOTE ADULT - SUBJECTIVE AND OBJECTIVE BOX
HPI:  31 year old single, disabled male, non-caregiver, domiciled in at [facility], with past psychiatric history of moderate intellectual disability, autism spectrum disorder, personality disorders, Impulse control disorders, factitious disorder, HAVEN, mood disorders (MDD, Bipolar, DMDD, Adjsutment), PTSD and ADHD, multiple past psychiatric admissions, past medical history of asthma, dyslipidemia, enuresis, urinary retention, GERD, BPH, hypothyroidism and b/l myopia, who presented to the ED with attempt of Suicide, he says he stabbed himself with a knife in the L hand and thigh, also endoreses urinary retention. As per ED provider, patient was able to void when he first got to the ED. Patient states that he has BPH and has been "catheterized 26 times", also endorses dysuria and states that recently he was at [facility] and had an infection in the left testicle, one of the managers from his home states he had an UTI. The Patient was seen by tele psych while in the ED and said to hold patient in ED. As per ED patient can't go back with the home or inpatient psych with a mckeon catheter, so has to be admitted.  (03 May 2022 03:13)    Patient seen and examined at bedside for urology consult for possible urinary retention. Patient presented due to agitation and suicidal ideation with a history of multiple psychiatric diagnoses. Patient states that he has been having problems with voiding for a year and that he also has some urinary infections. Per chart review patient presented to ED yesterday due to psych complaints and initial bladder scan showed volume of 130, repeat bladder scan was greater than 500 and patient stated that he was unable to void. Per patient he has a problem with his Prostate but is unsure what the problem is or how it was diagnosed. Also discussed with patients mother who was unable to provide more in depth detail about prostate issue. Patient and mom state that he has been taking Flomax, also that he had recent UTI. Patient currently admits to pain with burning. Complaining of current penile pain secondary to Mckeon catheter. Patient on 1:1 at time of exam.     PAST MEDICAL & SURGICAL HISTORY:  Mood disorder  Intellectual disability  Asthma  GERD   Factitious disorder  Urinary retention  Enuresis  Myopia of both eyes  Autism  Hypothyroidism  Hypothyroid  History of BPH  HLD  No significant past surgical history    Review of Systems: Contained within HPI    MEDICATIONS  (STANDING):  atorvastatin 10 milliGRAM(s) Oral at bedtime  busPIRone 15 milliGRAM(s) Oral every 12 hours  diVALproex  milliGRAM(s) Oral two times a day  ferrous    sulfate 325 milliGRAM(s) Oral daily  finasteride 5 milliGRAM(s) Oral daily  levothyroxine 50 MICROGram(s) Oral daily  lidocaine 2% Viscous 5 milliLiter(s) Swish and Spit once  LORazepam     Tablet 1 milliGRAM(s) Oral three times a day  mirtazapine 15 milliGRAM(s) Oral at bedtime  pantoprazole    Tablet 40 milliGRAM(s) Oral before breakfast  polyethylene glycol 3350 17 Gram(s) Oral daily  risperiDONE   Tablet 2 milliGRAM(s) Oral every 12 hours  senna 2 Tablet(s) Oral at bedtime  tamsulosin 0.4 milliGRAM(s) Oral at bedtime    MEDICATIONS  (PRN):  ALBUTerol    90 MICROgram(s) HFA Inhaler 2 Puff(s) Inhalation every 6 hours PRN Shortness of Breath and/or Wheezing  chlorproMAZINE    Injectable 50 milliGRAM(s) IntraMuscular every 6 hours PRN AGITATION    Allergies  Bee stings (Unknown)  Haldol (Other)  Zyprexa (Dystonic RXN)    Intolerances    FAMILY HISTORY:  FH: diabetes mellitus (Mother)    Vital Signs Last 24 Hrs  T(C): 36.4 (03 May 2022 13:38), Max: 37 (02 May 2022 20:27)  T(F): 97.6 (03 May 2022 13:38), Max: 98.6 (02 May 2022 20:27)  HR: 74 (03 May 2022 13:38) (74 - 101)  BP: 116/84 (03 May 2022 13:38) (105/77 - 147/70)  RR: 18 (03 May 2022 13:38) (18 - 18)  SpO2: 98% (03 May 2022 13:38) (95% - 98%)    Physical Exam:  General:  Appears stated age, well-groomed, well-nourished, no distress  Eyes: EOMI  HENT:  WNL, no JVD  Chest: respirations nonlabored  Abdomen: soft, NT/ND  : suprapubic tenderness to palpation, Mckeon catheter in place draining clear yellow urine: 1000ml recorded. Normal external genitalia. (chaperon present for exam)  Extremities: no edema bilaterally  Skin: warm and dry  Musculoskeletal: no calf tenderness  Neuro:  Alert, oriented to time, place and person   Psych: normal affect    LABS:                        12.8   5.78  )-----------( 230      ( 03 May 2022 10:52 )             41.9     05-03    140  |  106  |  16  ----------------------------<  141<H>  4.6   |  28  |  0.86    Ca    9.0      03 May 2022 10:52  Phos  3.6     05-03  Mg     1.8     05-03    TPro  6.4  /  Alb  2.9<L>  /  TBili  0.2  /  DBili  x   /  AST  23  /  ALT  37  /  AlkPhos  83  05-03    Urinalysis Basic - ( 03 May 2022 03:51 )    Color: Yellow / Appearance: Clear / S.010 / pH: x  Gluc: x / Ketone: Negative  / Bili: Negative / Urobili: Negative   Blood: x / Protein: Negative / Nitrite: Negative   Leuk Esterase: Negative / RBC: x / WBC x   Sq Epi: x / Non Sq Epi: x / Bacteria: x    RADIOLOGY & ADDITIONAL STUDIES: NONE

## 2022-05-03 NOTE — ED BEHAVIORAL HEALTH ASSESSMENT NOTE - PSYCHIATRIC ISSUES AND PLAN (INCLUDE STANDING AND PRN MEDICATION)
For agitation; would suggest Thorazine 50 mg IM (first line); Ativan 2 mg IM (second line); can repeat up to q6 hours PRN.

## 2022-05-03 NOTE — BH CONSULTATION LIAISON ASSESSMENT NOTE - DETAILS
Chart hx of prior trauma & PTSD (details unknown) H/o physical aggression, altercations with fellow [facility] home residents, residence staff Urinary retention Healing superficial lacerations on L hand and thigh (self-inflicted)

## 2022-05-03 NOTE — BH CONSULTATION LIAISON ASSESSMENT NOTE - ADDITIONAL DETAILS ALL
Multiple past incidents of self-injury (cutting/stabbing/scratching self with plastic utensils, keys and pens, head-banging) but no documented SAs

## 2022-05-03 NOTE — H&P ADULT - PROBLEM SELECTOR PLAN 3
pt with bph and hx of urinary retention  complaining of same sxs  mckeon placed in ED  f/u UA and culture  continue tamsulosin and finasteride  UROLOGY TO BE CONSULTED IN AM

## 2022-05-03 NOTE — ED BEHAVIORAL HEALTH ASSESSMENT NOTE - DESCRIPTION (FIRST USE, LAST USE, QUANTITY, FREQUENCY, DURATION)
pt reports nicotine/cigarette vape use but says "I don't know" when asked when he last used pt reports nicotine/cigarette vape use but says "I don't know" when asked when he last used; unclear if this is true or fabricated.

## 2022-05-03 NOTE — BH CONSULTATION LIAISON ASSESSMENT NOTE - NSBHCONSULTRECOMMENDOTHER_PSY_A_CORE FT
1. C/w home psych meds as reported  2. For moderate agitation, c/w Seroquel 50 mg q6h PRN moderate agitation  3. For severe agitation, c/w Thorazine 50 mg IM q6h PRN severe agitation  4. Medical management as directed by primary team  5. Pt is psych cleared for D/C home as soon as he is medically optimized  6. SW is in communication with residence to make arrangements for return transportation  7. Case d/w Dr. Bryson of primary team    Dariana Dumas MD  Director, Consultation-Liaison Psychiatry Service  [phone]

## 2022-05-03 NOTE — ED BEHAVIORAL HEALTH ASSESSMENT NOTE - NSCURPASTPSYDX_PSY_ALL_CORE
intellectual disability/Mood disorder/ADHD/Conduct problems intellectual disability; ASD; ICD/Mood disorder/ADHD/PTSD/Cluster B Personality disorder/traits/Conduct problems

## 2022-05-03 NOTE — BH CONSULTATION LIAISON ASSESSMENT NOTE - PAST PSYCHOTROPIC MEDICATION
Zyprexa,  Haloperidol, Clonidine, Topamax, Klonopin, Seroquel, Guanfacine, Thorazine, Abilify, Trazodone, Gabapentin, Tegetrol, Trileptal, Perphenazine, Prozac, Lithium, Zoloft and Hydroxyzine

## 2022-05-03 NOTE — ED BEHAVIORAL HEALTH ASSESSMENT NOTE - NSPRESENTSXS_PSY_ALL_CORE
Impulsivity Depressed mood/Anhedonia/Impulsivity/Severe anxiety, agitation or panic/Refusal or inability to complete safety plan

## 2022-05-03 NOTE — BH CONSULTATION LIAISON ASSESSMENT NOTE - DIFFERENTIAL
Moderate intellectual disability  Autism spectrum disorder  HAVEN  Mood DO NOS  Chronic PTSD  Somatic symptom DO  ADHD

## 2022-05-03 NOTE — PATIENT PROFILE ADULT - FALL HARM RISK - HARM RISK INTERVENTIONS
Assistance with ambulation/Assistance OOB with selected safe patient handling equipment/Communicate Risk of Fall with Harm to all staff/Discuss with provider need for PT consult/Monitor gait and stability/Reinforce activity limits and safety measures with patient and family/Tailored Fall Risk Interventions/Visual Cue: Yellow wristband and red socks/Bed in lowest position, wheels locked, appropriate side rails in place/Call bell, personal items and telephone in reach/Instruct patient to call for assistance before getting out of bed or chair/Non-slip footwear when patient is out of bed/[place] to call system/Physically safe environment - no spills, clutter or unnecessary equipment/Purposeful Proactive Rounding/Room/bathroom lighting operational, light cord in reach

## 2022-05-03 NOTE — H&P ADULT - ATTENDING COMMENTS
Pt seen at bedside and examined with MAR.  31 year old resident of a Group home due to intellectual disability with PMH of BPH and multiple personality and psych problems presenting with suicidal attempt and found during ED evaluation to have urinary retention. HE denies any urinary inflammatory symptoms except he is unable to pee. He has hx of BPH with many episodes of urinary retention requiring periods of bladder catheterization. HE states he has been advised he needs a prostate surgery for definitive treatment.    ROS not contributory   Exam including scrotal and phallic exam unremarkable     Labs   reviewed  Unchanged from baseline     Impression   - Multiple personality and psych problems   with suicidal attempt  - Urinary retention obstructive from BPH     Plan   Psych consulted - will make final determination on plans  Place Sarabia catheter  Restart prostate medications   Urology consult  Other plans as above

## 2022-05-03 NOTE — H&P ADULT - PROBLEM SELECTOR PLAN 1
Pt with multiple psychiatric conditions, on mulitiple medications  Confirm home meds in the am  Psych consulted and following  Continue home meds as per Behavioral note

## 2022-05-03 NOTE — BH CONSULTATION LIAISON ASSESSMENT NOTE - NSBHCHARTREVIEWLAB_PSY_A_CORE FT
12.8   5.78  )-----------( 230      ( 03 May 2022 10:52 )             41.9   05-03    140  |  106  |  16  ----------------------------<  141<H>  4.6   |  28  |  0.86    Ca    9.0      03 May 2022 10:52  Phos  3.6     05-03  Mg     1.8     05-03    TPro  6.4  /  Alb  2.9<L>  /  TBili  0.2  /  DBili  x   /  AST  23  /  ALT  37  /  AlkPhos  83  05-03

## 2022-05-03 NOTE — BH CONSULTATION LIAISON ASSESSMENT NOTE - NSBHCHARTREVIEWVS_PSY_A_CORE FT
Vital Signs Last 24 Hrs  T(C): 36.5 (03 May 2022 15:33), Max: 37 (02 May 2022 20:27)  T(F): 97.7 (03 May 2022 15:33), Max: 98.6 (02 May 2022 20:27)  HR: 89 (03 May 2022 15:33) (74 - 101)  BP: 123/86 (03 May 2022 15:33) (105/77 - 147/70)  BP(mean): --  RR: 18 (03 May 2022 15:33) (18 - 18)  SpO2: 97% (03 May 2022 15:33) (95% - 98%)

## 2022-05-03 NOTE — ED BEHAVIORAL HEALTH ASSESSMENT NOTE - DESCRIPTION
see HPI Per chart review; Pt was Born in [place], raised in [place]. Parents are no longer together; mother lives in [place] and works as dialysis technician. Has sister who lives in [place]. Previously living in [facility] group home where he had GF (fellow resident), but relationship ended >2 yrs ago. Living in respite [facility] since 1/2021 due to breakup, conflicts with fellow residents. As per HPI ED course and collateral attempts are as per BTCM (ED Behavioral health) note

## 2022-05-03 NOTE — ED BEHAVIORAL HEALTH ASSESSMENT NOTE - VIOLENCE PROTECTIVE FACTORS:
Residential stability/Relationship stability/Sobriety/Engagement in treatment/Other Residential stability/Engagement in treatment

## 2022-05-03 NOTE — BH CONSULTATION LIAISON ASSESSMENT NOTE - DESCRIPTION
Born in [place], raised in [place]. Parents are no longer together; mother lives in [place] and works as dialysis technician. Has sister who lives in [place]. Previously living in [facility] group home where he had GF (fellow resident), but relationship ended >2 yrs ago. Living in respite [facility] since 1/2021 due to breakup, conflicts with fellow residents.

## 2022-05-03 NOTE — BH CONSULTATION LIAISON ASSESSMENT NOTE - NSCURPASTPSYDX_PSY_ALL_CORE
intellectual disability; ASD; ICD/Mood disorder/ADHD/PTSD/Cluster B Personality disorder/traits/Conduct problems

## 2022-05-03 NOTE — ED BEHAVIORAL HEALTH ASSESSMENT NOTE - DIFFERENTIAL
Moderate intellectual disability  Autism spectrum disorder  Impulse control disorder  Factitious disorder  Somatic symptom d/o  ADHD  HAVEN  Mood d/o

## 2022-05-03 NOTE — ED BEHAVIORAL HEALTH ASSESSMENT NOTE - ADDITIONAL DETAILS ALL
as above pt conveys active SI, death wishes, plan and intent but unable to validate legitimacy of his reports.

## 2022-05-03 NOTE — DISCHARGE NOTE PROVIDER - NSDCPNSUBOBJ_GEN_ALL_CORE
Patient with significant Psychiatric history with frequent admissions for urinary retention with mckeon placement and easily passes trial of void.   As per ED note, patient voided in ED but when told he can return back and Psych cleared he said he could not void requesting for admission.   He reports this suicidal ideations on multiple prior admissions but without any intent and without a clear plan and intent of harming.  He appears to be attention seeking to get admitted and symptom presentation which is repeatedly same.  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  He is at risk of urinary retention form anticholinergic effects of psych meds but his last admission was a month ago and if he truly is in retention would have seen rise in Bun/ Cr from postobstructive Uropathy and possible Hydronephrosis  In my opinion, Patient should be a Complex Care patient and repeated admissions should be avoided. Patient should may be straight cathed and mckeon should be avoided.  Patient requested return to residence; He spoke with his   Informed  Christina and with  Hetal who knows patient pretty well from prior admissions; SW spoke with Group Home  who accepted patient back and agreed to take patient home.   Discussed with ACP Angelic; Continue home meds as before; no change in meds  Psych consult appreciated; d/w Dr. Dumas; advised to discharge patient back to facility.

## 2022-05-03 NOTE — H&P ADULT - ASSESSMENT
31 year old single, disabled male, non-caregiver, domiciled in at [facility], with past psychiatric history of moderate intellectual disability, autism spectrum disorder, personality disorders, Impulse control disorders, factitious disorder, HAVEN, mood disorders (MDD, Bipolar, DMDD, Adjsutment), PTSD and ADHD, multiple past psychiatric admissions, past medical history of asthma, dyslipidemia, enuresis, urinary retention, GERD, BPH, hypothyroidism and b/l myopia, who presented to the ED with attempt of Suicide and urinary retention.      PRIMARY TEAM TO CONFIRM HOME MEDS, AID INSTRUCTED TO FAX LIST TO ED OR TO BRING HARD COPY, CAN ALSO VERIFY WITH PHARMACY

## 2022-05-03 NOTE — ED BEHAVIORAL HEALTH ASSESSMENT NOTE - PAST PSYCHOTROPIC MEDICATION
Past Psychotropic Medication  zyprexa; divalproex ; haloperidol ; benztropine  docusate 100mg po TID, clonidine 0.1mg po TID; omeprazole 20mg po qAM; simvastatin 5mg po daily; hydroxy hcl 25mg po BID; levothyroxine 50mcg po daily; vitamin d3 1000IU po daily As per psyckes: Zyprexa,  Haloperidol, Clonidine, Topamax, Klonopin, Seroquel, Guanfacine, Thorazine, Abilify, Trazodone, Gabapentin, Tegetrol, Trileptal, Perphenazine, Prozac, Lithium, Zoloft and Hydroxyzine.

## 2022-05-03 NOTE — CHART NOTE - NSCHARTNOTEFT_GEN_A_CORE
KEKE consult for safe discharge to group home.      Pt is a 31 year old male with pmhx of intellectual disability, autism, mood disorder, frequent self injury including cutting and head banging . Pt was sent to the ED from [facility] after cutting himself. States he used butter knife to cut L hand and L thigh because he "wants to die". Pt also reports urinary retention prior to arrival . Pt denies drug or etoh use. January met with the Pt at bedside, he appeared alert and oriented x3. On 1:1 observation. Pt states he wants to go home because tomorrow is his birthday. January provided emotional support. Pt was seen by psych, he denied suicidal ideations or homicidal ideations and was cleared for discharge back to group home. Per physician, indwelling catheter was removed and Pt was able to void .January outreached [place] at Pt's residence at  ([phone]) re Pt's d/c. Cassy asked january to coordinate time and mode of  with Emily, Pt's respite, D/c coordinated with Emily via telephone at . Emily scheduled to pick Pt up from the ED with [employer]. Medical team updated.

## 2022-05-04 LAB
CULTURE RESULTS: NO GROWTH — SIGNIFICANT CHANGE UP
SPECIMEN SOURCE: SIGNIFICANT CHANGE UP

## 2022-05-05 ENCOUNTER — EMERGENCY (EMERGENCY)
Facility: HOSPITAL | Age: 32
LOS: 1 days | Discharge: HOME | End: 2022-05-05
Attending: STUDENT IN AN ORGANIZED HEALTH CARE EDUCATION/TRAINING PROGRAM
Payer: MEDICAID

## 2022-05-05 VITALS
WEIGHT: 257.06 LBS | HEIGHT: 71 IN | OXYGEN SATURATION: 98 % | HEART RATE: 100 BPM | SYSTOLIC BLOOD PRESSURE: 142 MMHG | DIASTOLIC BLOOD PRESSURE: 90 MMHG | RESPIRATION RATE: 18 BRPM | TEMPERATURE: 98 F

## 2022-05-05 PROCEDURE — 93010 ELECTROCARDIOGRAM REPORT: CPT

## 2022-05-05 PROCEDURE — 99284 EMERGENCY DEPT VISIT MOD MDM: CPT

## 2022-05-05 NOTE — ED ADULT TRIAGE NOTE - CHIEF COMPLAINT QUOTE
Chest pain, hyperglycemia. Pt cut left hand intentionally with kitchen knife. "Im stressed, so I cut myself". 4x 81mg ASA given by EMS on the field. Constant observation initiated. MD notified. Chest pain, hyperglycemia. Pt cut left hand intentionally with kitchen knife. "Im stressed, so I cut myself". 4x 81mg ASA given by EMS on the field. Constant observation initiated. MD Solorio notified.

## 2022-05-05 NOTE — ED ADULT NURSE NOTE - CHIEF COMPLAINT QUOTE
Chest pain, hyperglycemia. Pt cut left hand intentionally with kitchen knife. "Im stressed, so I cut myself". 4x 81mg ASA given by EMS on the field. Constant observation initiated. MD Solorio notified.

## 2022-05-05 NOTE — ED ADULT NURSE NOTE - CAS ELECT INFOMATION PROVIDED
Follow-up with primary care provider. Return to the ED for new or worsening symptoms./DC instructions

## 2022-05-05 NOTE — ED ADULT NURSE NOTE - OBJECTIVE STATEMENT
31 yo male lying on bed c/o wound on the left hand. As per patient, his left hand started feeling itchy and, so, he scratched it. Moist, pinkish wound noted. 31 yo male lying on bed c/o wound on the left hand. As per patient, his left hand started feeling itchy and, so, he scratched it. Moist, pinkish wound with mild swelling noted.

## 2022-05-05 NOTE — ED ADULT NURSE NOTE - NSIMPLEMENTINTERV_GEN_ALL_ED
Implemented All Universal Safety Interventions:  [place] to call system. Call bell, personal items and telephone within reach. Instruct patient to call for assistance. Room bathroom lighting operational. Non-slip footwear when patient is off stretcher. Physically safe environment: no spills, clutter or unnecessary equipment. Stretcher in lowest position, wheels locked, appropriate side rails in place.

## 2022-05-05 NOTE — ED ADULT NURSE NOTE - NSPATIENTFLAG_GEN_A_ER
Green (Altered Mental Status/Behavior)/Black X (History of Violence or Aggression)/Purple DH (Discharge Huddle; Vulnerable Patient)

## 2022-05-05 NOTE — ED ADULT NURSE NOTE - NS ED NOTE  TALK SOMEONE YN
[de-identified] : 21 yo male previously healthy with pilonidal abscess s/p drainage on 3/3/22, presenting for evaluation of healing I&D site. Reports resolution of pain, no discharge, no fevers/chills.  [de-identified] : Packing removed in office and fresh packing placed. Healing well. No

## 2022-05-05 NOTE — ED ADULT NURSE NOTE - IS THE PATIENT ABLE TO BE SCREENED?
Received call from Colt Urbina,  through [employer].  If any assistance is needed with discharge planning he can be reached at [phone].     Yes

## 2022-05-05 NOTE — ED ADULT TRIAGE NOTE - NSPATIENTFLAG_GEN_A_ER
Green (Altered Mental Status/Behavior)/Black X (History of Violence or Aggression) Green (Altered Mental Status/Behavior)/Black X (History of Violence or Aggression)/Purple DH (Discharge Huddle; Vulnerable Patient)

## 2022-05-06 VITALS
TEMPERATURE: 98 F | RESPIRATION RATE: 18 BRPM | SYSTOLIC BLOOD PRESSURE: 121 MMHG | DIASTOLIC BLOOD PRESSURE: 84 MMHG | HEART RATE: 92 BPM | OXYGEN SATURATION: 96 %

## 2022-05-06 PROCEDURE — 82962 GLUCOSE BLOOD TEST: CPT

## 2022-05-06 PROCEDURE — 99283 EMERGENCY DEPT VISIT LOW MDM: CPT

## 2022-05-06 PROCEDURE — 93005 ELECTROCARDIOGRAM TRACING: CPT

## 2022-05-06 RX ORDER — CEPHALEXIN 500 MG
1 CAPSULE ORAL
Qty: 28 | Refills: 0
Start: 2022-05-06 | End: 2022-05-12

## 2022-05-06 RX ORDER — CEPHALEXIN 500 MG
500 CAPSULE ORAL ONCE
Refills: 0 | Status: COMPLETED | OUTPATIENT
Start: 2022-05-06 | End: 2022-05-06

## 2022-05-06 RX ADMIN — Medication 500 MILLIGRAM(S): at 00:26

## 2022-05-06 NOTE — ED PROVIDER NOTE - OBJECTIVE STATEMENT
31 yo M domiciled in at [facility] group home, with past psychiatric history of moderate intellectual disability, autism spectrum disorder, personality disorders, factitious disorder, HAVEN, mood disorders, PTSD and ADHD, asthma, dyslipidemia, enuresis, urinary retention, GERD, BPH, hypothyroidism p/w chest pain and then denies chest pain. Then c/o "I stabbed myself in the hand" though abrasion on hand not consistent with stabbing and when questioned pt then states that he did not stab himself and instead incurred this abrasion a week ago from unknown mechanism and now believes that it's infected. Pt denies recent fever/ N/V/ diarrhea, dysuria or frequency/hesitancy, chest pain, SOB, focal numbness/weakness, syncope, other recent illness or hospitalizations.

## 2022-05-06 NOTE — ED PROVIDER NOTE - PHYSICAL EXAMINATION
Vital Signs Reviewed  GEN: Comfortable, NAD, AAOx3  HEENT: NCAT, MMM, Neck Supple  RESP: CTAB, No rales/rhonchi/wheezing  CV: RRR, S1S2, No murmurs  ABD: No TTP, Soft, ND, No masses, No CVA Tenderness  Extrem/Skin: L hand dorsum with 2x2cm superficial abrasion with mild surrounding erythema, No induration/pus, No lacs nor puncture wounds, Equal pulses bilat, No cyanosis/edema/rashes  Neuro: No focal deficits

## 2022-05-06 NOTE — ED PROVIDER NOTE - NSFOLLOWUPINSTRUCTIONS_ED_ALL_ED_FT
You were seen in the emergency room today for a possible infection on the hand. Please call your primary doctor to inform them of this ER visit and obtain the next available appointment within the next 5 days. As we discussed, return to the ER if you have any worsening symptoms.    Please  your prescription and take as directed. Immediately seek medical attention or return to the ER if you have signs or symptoms of an allergic reaction after taking the medication (including- rash or swelling, wheezing or shortness or breath, vomiting or belly pain).    We no longer feel that you need further emergency care or admission to the hospital at this time.    While we have determined that you are currently stable for discharge, we know that things can change. Please seek immediate medical attention or return to the ER if you experience any of the following:  Any worsening or persistent symptoms  Severe Pain  Chest Pain  Difficulty Breathing  Bleeding  Passing Out  Severe Rash  Inability to Eat or Drink  Persistent Fever    Please see a primary care doctor or specialist within 5 days to ensure that you are improving.    Please call the [employer] phone numbers on this document if you have any problems obtaining a follow up appointment.    I wish you well! -Dr Georges

## 2022-05-06 NOTE — ED PROVIDER NOTE - PATIENT PORTAL LINK FT
You can access the FollowMyHealth Patient Portal offered by [facility] by registering at the following website: [email or web address]. By joining [employer]’s FollowMyHealth portal, you will also be able to view your health information using other applications (apps) compatible with our system.

## 2022-05-06 NOTE — ED PROVIDER NOTE - CLINICAL SUMMARY MEDICAL DECISION MAKING FREE TEXT BOX
Pt p/w abrasion to dorsum of L hand with mild erythema. Pt initially states "I stabbed myself" and then states that he has an abrasion on the hand that he's concerned is infected and is uncertain how it occurred. Mild erythema, will tx as early cellulitis. Pt also c/o chest pain and then denies chest pain. EKG wnl. Pt is with HHA and denies SI/HI/hallucinations. Rec PMD f/u ASAP. Most likely mild cellulitis vs other non emergent etiology of symptoms- the details of the case, history, and exam make more emergent diagnoses much less likely. Discussed with pt my clinical impression and results, patient given strict return precautions if persistent or worsening of symptoms occurs, and need for close follow up. Pt expressed understanding and agrees with plan. Pt is well appearing with a reassuring exam. Discharge home with PMD or Specialist f/u within 5 days.

## 2022-05-08 ENCOUNTER — EMERGENCY (EMERGENCY)
Facility: HOSPITAL | Age: 32
LOS: 1 days | Discharge: HOME | End: 2022-05-08
Attending: STUDENT IN AN ORGANIZED HEALTH CARE EDUCATION/TRAINING PROGRAM
Payer: MEDICAID

## 2022-05-08 VITALS
DIASTOLIC BLOOD PRESSURE: 76 MMHG | HEART RATE: 90 BPM | SYSTOLIC BLOOD PRESSURE: 112 MMHG | RESPIRATION RATE: 19 BRPM | TEMPERATURE: 98 F | WEIGHT: 279.99 LBS | OXYGEN SATURATION: 97 % | HEIGHT: 71 IN

## 2022-05-08 PROCEDURE — 99282 EMERGENCY DEPT VISIT SF MDM: CPT

## 2022-05-08 PROCEDURE — 99284 EMERGENCY DEPT VISIT MOD MDM: CPT

## 2022-05-08 NOTE — ED PROVIDER NOTE - OBJECTIVE STATEMENT
32 y.o presenting with complaint of urinary retention. endorses that he hasn't urinated x 2 days. denies n, v, fever, cough. history of similar presentation in the past. patient state's "I have to get a mckeon and has to stay inpatient once he gets a mckeon placed"

## 2022-05-08 NOTE — ED PROVIDER NOTE - CARE PROVIDER_API CALL
Len Lutz)  Urology  [address] [place] [address]  Phone: [phone]  Fax: [phone]  Follow Up Time:

## 2022-05-08 NOTE — ED PROVIDER NOTE - PATIENT PORTAL LINK FT
You can access the FollowMyHealth Patient Portal offered by [facility] by registering at the following website: [email or web address]. By joining [employer]’s FollowMyHealth portal, you will also be able to view your health information using other applications (apps) compatible with our system.

## 2022-05-08 NOTE — ED PROVIDER NOTE - NSFOLLOWUPINSTRUCTIONS_ED_ALL_ED_FT
Urinary Retention in Men    WHAT YOU NEED TO KNOW:    What is urinary retention? Urinary retention is a condition that develops when your bladder does not empty completely when you urinate.  Male Reproductive System         What causes urinary retention?   •An enlarged prostate      •Blockages, such as a stone, growth, or narrowing of your urethra      •A weak bladder muscle      •Nerve damage from diabetes, stroke, or spinal cord injury      •Bladder diverticula, which are pockets of urine that form in your bladder and do not empty      •Certain medicines, such as narcotics, antihistamines, or antidepressants      What are the signs and symptoms of urinary retention?   •Frequent urination, or the urge to urinate right after you finish      •An urge to urinate, but your urine does not come out or dribbles out slowly and weakly      •Frequent urine leaks that happen during the day or while you sleep      •Pain or pressure when you urinate      •Pain or stiffness in your abdomen, lower back, hips, or upper thighs      •Blood in your urine      How is urinary retention diagnosed? Your healthcare provider will ask about your health history and the medicines you take. He will press or tap on your lower abdomen. You may need any of the following tests:   •A digital rectal exam is when healthcare providers carefully feel the size of your prostate.      •A post void residual test will show how much urine is left in your bladder after you urinate. You will be asked to urinate and then healthcare providers will use a small ultrasound machine to check how much urine is left in your bladder.      •Blood or urine tests may show infection or prostate specific antigen (PSA) levels. PSA may be elevated in prostate cancer.      •An ultrasound uses sound waves to show pictures on a monitor. An ultrasound may be done to show bladder stones, infection, or other problems.      •A CT scan, or CAT scan, is a type of x-ray that is taken of your prostate, kidneys, and bladder. The pictures may show what is causing your urinary retention. You may be given a dye before the pictures are taken to help healthcare providers see the pictures better. Tell the healthcare provider if you have ever had an allergic reaction to contrast dye.      How is urinary retention treated?   •A Sarabia catheter is a tube put into your bladder to drain urine into a bag. Keep the bag below your waist. This will prevent urine from flowing back into your bladder and causing an infection or other problems. Also, keep the tube free of kinks so the urine will drain properly. Do not pull on the catheter. This can cause pain and bleeding, and may cause the catheter to come out.       •Medicines can help decrease the size of your prostate, fight infection, and help you urinate more easily.      •Surgery may be needed to treat the condition that is causing your urinary retention.       When should I contact my healthcare provider?   •You have a fever.      •You have pain when you urinate.      •You have blood in your urine.      •You have problems with your catheter.      •You have questions or concerns about your condition or care.      When should I seek immediate care or call 911?   •You have severe abdominal pain.      •You are breathing faster than usual.      •Your heartbeat is faster than usual.      •Your face, hands, feet, or ankles are swollen.       CARE AGREEMENT:    You have the right to help plan your care. Learn about your health condition and how it may be treated. Discuss treatment options with your healthcare providers to decide what care you want to receive. You always have the right to refuse treatment.

## 2022-05-08 NOTE — ED ADULT NURSE NOTE - OBJECTIVE STATEMENT
Patient presents with c/o urinary retention sense this morning, reports lower abdominal pain, no N/V, fever noted.

## 2022-05-08 NOTE — ED PROVIDER NOTE - CLINICAL SUMMARY MEDICAL DECISION MAKING FREE TEXT BOX
Patient presenting for retention, history of similar complaint in the past. patient urinated spontaneously in the ED, no retention observed. well appearing. patient instructed to f.u outpatient urology, return precautions instructed

## 2022-05-21 ENCOUNTER — EMERGENCY (EMERGENCY)
Facility: HOSPITAL | Age: 32
LOS: 1 days | Discharge: HOME | End: 2022-05-21
Attending: STUDENT IN AN ORGANIZED HEALTH CARE EDUCATION/TRAINING PROGRAM
Payer: MEDICAID

## 2022-05-21 ENCOUNTER — EMERGENCY (EMERGENCY)
Facility: HOSPITAL | Age: 32
LOS: 1 days | Discharge: HOME | End: 2022-05-21
Attending: EMERGENCY MEDICINE
Payer: MEDICAID

## 2022-05-21 VITALS
TEMPERATURE: 98 F | RESPIRATION RATE: 18 BRPM | HEIGHT: 71 IN | DIASTOLIC BLOOD PRESSURE: 84 MMHG | OXYGEN SATURATION: 96 % | HEART RATE: 92 BPM | SYSTOLIC BLOOD PRESSURE: 126 MMHG

## 2022-05-21 VITALS
OXYGEN SATURATION: 97 % | HEART RATE: 76 BPM | WEIGHT: 289.03 LBS | DIASTOLIC BLOOD PRESSURE: 83 MMHG | RESPIRATION RATE: 17 BRPM | HEIGHT: 71 IN | SYSTOLIC BLOOD PRESSURE: 120 MMHG | TEMPERATURE: 98 F

## 2022-05-21 LAB
ALBUMIN SERPL ELPH-MCNC: 3 G/DL — LOW (ref 3.5–5)
ALP SERPL-CCNC: 87 U/L — SIGNIFICANT CHANGE UP (ref 40–120)
ALT FLD-CCNC: 51 U/L DA — SIGNIFICANT CHANGE UP (ref 10–60)
ANION GAP SERPL CALC-SCNC: 7 MMOL/L — SIGNIFICANT CHANGE UP (ref 5–17)
APPEARANCE UR: CLEAR — SIGNIFICANT CHANGE UP
AST SERPL-CCNC: 18 U/L — SIGNIFICANT CHANGE UP (ref 10–40)
BASOPHILS # BLD AUTO: 0.04 K/UL — SIGNIFICANT CHANGE UP (ref 0–0.2)
BASOPHILS NFR BLD AUTO: 0.6 % — SIGNIFICANT CHANGE UP (ref 0–2)
BILIRUB SERPL-MCNC: 0.2 MG/DL — SIGNIFICANT CHANGE UP (ref 0.2–1.2)
BILIRUB UR-MCNC: NEGATIVE — SIGNIFICANT CHANGE UP
BUN SERPL-MCNC: 15 MG/DL — SIGNIFICANT CHANGE UP (ref 7–18)
CALCIUM SERPL-MCNC: 9.1 MG/DL — SIGNIFICANT CHANGE UP (ref 8.4–10.5)
CHLORIDE SERPL-SCNC: 105 MMOL/L — SIGNIFICANT CHANGE UP (ref 96–108)
CO2 SERPL-SCNC: 28 MMOL/L — SIGNIFICANT CHANGE UP (ref 22–31)
COLOR SPEC: YELLOW — SIGNIFICANT CHANGE UP
CREAT SERPL-MCNC: 0.84 MG/DL — SIGNIFICANT CHANGE UP (ref 0.5–1.3)
DIFF PNL FLD: NEGATIVE — SIGNIFICANT CHANGE UP
EGFR: 118 ML/MIN/1.73M2 — SIGNIFICANT CHANGE UP
EOSINOPHIL # BLD AUTO: 0.52 K/UL — HIGH (ref 0–0.5)
EOSINOPHIL NFR BLD AUTO: 7.4 % — HIGH (ref 0–6)
GLUCOSE SERPL-MCNC: 119 MG/DL — HIGH (ref 70–99)
GLUCOSE UR QL: NEGATIVE — SIGNIFICANT CHANGE UP
HCT VFR BLD CALC: 41.5 % — SIGNIFICANT CHANGE UP (ref 39–50)
HGB BLD-MCNC: 13.1 G/DL — SIGNIFICANT CHANGE UP (ref 13–17)
IMM GRANULOCYTES NFR BLD AUTO: 1.1 % — SIGNIFICANT CHANGE UP (ref 0–1.5)
KETONES UR-MCNC: NEGATIVE — SIGNIFICANT CHANGE UP
LEUKOCYTE ESTERASE UR-ACNC: NEGATIVE — SIGNIFICANT CHANGE UP
LYMPHOCYTES # BLD AUTO: 2.56 K/UL — SIGNIFICANT CHANGE UP (ref 1–3.3)
LYMPHOCYTES # BLD AUTO: 36.3 % — SIGNIFICANT CHANGE UP (ref 13–44)
MAGNESIUM SERPL-MCNC: 2 MG/DL — SIGNIFICANT CHANGE UP (ref 1.6–2.6)
MCHC RBC-ENTMCNC: 24 PG — LOW (ref 27–34)
MCHC RBC-ENTMCNC: 31.6 GM/DL — LOW (ref 32–36)
MCV RBC AUTO: 76.1 FL — LOW (ref 80–100)
MONOCYTES # BLD AUTO: 0.63 K/UL — SIGNIFICANT CHANGE UP (ref 0–0.9)
MONOCYTES NFR BLD AUTO: 8.9 % — SIGNIFICANT CHANGE UP (ref 2–14)
NEUTROPHILS # BLD AUTO: 3.22 K/UL — SIGNIFICANT CHANGE UP (ref 1.8–7.4)
NEUTROPHILS NFR BLD AUTO: 45.7 % — SIGNIFICANT CHANGE UP (ref 43–77)
NITRITE UR-MCNC: NEGATIVE — SIGNIFICANT CHANGE UP
NRBC # BLD: 0 /100 WBCS — SIGNIFICANT CHANGE UP (ref 0–0)
PH UR: 7 — SIGNIFICANT CHANGE UP (ref 5–8)
PLATELET # BLD AUTO: 230 K/UL — SIGNIFICANT CHANGE UP (ref 150–400)
POTASSIUM SERPL-MCNC: 4.4 MMOL/L — SIGNIFICANT CHANGE UP (ref 3.5–5.3)
POTASSIUM SERPL-SCNC: 4.4 MMOL/L — SIGNIFICANT CHANGE UP (ref 3.5–5.3)
PROT SERPL-MCNC: 6.7 G/DL — SIGNIFICANT CHANGE UP (ref 6–8.3)
PROT UR-MCNC: NEGATIVE — SIGNIFICANT CHANGE UP
RBC # BLD: 5.45 M/UL — SIGNIFICANT CHANGE UP (ref 4.2–5.8)
RBC # FLD: 16.1 % — HIGH (ref 10.3–14.5)
SARS-COV-2 RNA SPEC QL NAA+PROBE: SIGNIFICANT CHANGE UP
SODIUM SERPL-SCNC: 140 MMOL/L — SIGNIFICANT CHANGE UP (ref 135–145)
SP GR SPEC: 1.01 — SIGNIFICANT CHANGE UP (ref 1.01–1.02)
UROBILINOGEN FLD QL: NEGATIVE — SIGNIFICANT CHANGE UP
WBC # BLD: 7.05 K/UL — SIGNIFICANT CHANGE UP (ref 3.8–10.5)
WBC # FLD AUTO: 7.05 K/UL — SIGNIFICANT CHANGE UP (ref 3.8–10.5)

## 2022-05-21 PROCEDURE — 80053 COMPREHEN METABOLIC PANEL: CPT

## 2022-05-21 PROCEDURE — 87086 URINE CULTURE/COLONY COUNT: CPT

## 2022-05-21 PROCEDURE — 99283 EMERGENCY DEPT VISIT LOW MDM: CPT

## 2022-05-21 PROCEDURE — 87635 SARS-COV-2 COVID-19 AMP PRB: CPT

## 2022-05-21 PROCEDURE — 83735 ASSAY OF MAGNESIUM: CPT

## 2022-05-21 PROCEDURE — 51702 INSERT TEMP BLADDER CATH: CPT

## 2022-05-21 PROCEDURE — 99284 EMERGENCY DEPT VISIT MOD MDM: CPT

## 2022-05-21 PROCEDURE — 99282 EMERGENCY DEPT VISIT SF MDM: CPT

## 2022-05-21 PROCEDURE — 85025 COMPLETE CBC W/AUTO DIFF WBC: CPT

## 2022-05-21 PROCEDURE — 81003 URINALYSIS AUTO W/O SCOPE: CPT

## 2022-05-21 PROCEDURE — 36415 COLL VENOUS BLD VENIPUNCTURE: CPT

## 2022-05-21 PROCEDURE — 99283 EMERGENCY DEPT VISIT LOW MDM: CPT | Mod: 25

## 2022-05-21 RX ORDER — SODIUM CHLORIDE 9 MG/ML
1000 INJECTION INTRAMUSCULAR; INTRAVENOUS; SUBCUTANEOUS ONCE
Refills: 0 | Status: COMPLETED | OUTPATIENT
Start: 2022-05-21 | End: 2022-05-21

## 2022-05-21 RX ADMIN — SODIUM CHLORIDE 1000 MILLILITER(S): 9 INJECTION INTRAMUSCULAR; INTRAVENOUS; SUBCUTANEOUS at 10:39

## 2022-05-21 NOTE — ED PROVIDER NOTE - CLINICAL SUMMARY MEDICAL DECISION MAKING FREE TEXT BOX
32-year-old male ambulates independently, from [facility] group home, PMHx of autism w/ moderate intellectual disability, asthma, hypothyroidism, obesity, pre DM, GERD, impulse control disorder, factitious disorder, urinary retention presenting with inability to urinate x 1 day. Will place Sarabia and send urine for analysis. Per patient, caretaker, and previous notes, pt's group home does not have ability to manage the urinary catheter, and patient needs to be admitted for this. 32-year-old male ambulates independently, from [facility] home, PMHx of autism w/ moderate intellectual disability, asthma, hypothyroidism, obesity, pre DM, GERD, impulse control disorder, factitious disorder, urinary retention presenting with inability to urinate x 1 day. Will place Sarabia and send urine for analysis. Per previous admission notes in pt's other chart, patient does not have true urinary retention (and here only had ~100cc urine on bladder scan), can be straight cathed and discharged back, confirmed with hospitalist that this is the case. Advised hydration and UOP monitoring.

## 2022-05-21 NOTE — ED PROVIDER NOTE - NSFOLLOWUPINSTRUCTIONS_ED_ALL_ED_FT
You were seen in the emergency department for: problems peeing  Your results report is attached - everything was normal.   Please stay hydrated and monitor your urination.     You may be contacted by the Emergency Department Referrals Coordinator to set up your follow-up appointment within 24-48 hours of your discharge, Monday to Friday. We recommend you follow up with: your primary care doctor.    Please return to the Emergency Department if you experience any of the following symptoms:   - Shortness of breath or trouble breathing  - Pressure, pain or tightness in the chest  - Face drooping, arm weakness or speech difficulty  - Persistence of severe vomiting  - Head injury or loss of consciousness  - Nonstop bleeding or an open wound    (1) Follow up with your primary care physician within the next 24-48 hours as discussed. In addition, we did not find evidence of a life threatening illness on your testing here today, but listed below are the specialists that will be necessary to see as an outpatient to continue the workup.  Please call the numbers listed below or [phone] to set up the necessary appointments.  (2) Take Tylenol (up to 1000mg or 1 g)  and/or Motrin (up to 600mg) up to every 6 hours as needed for pain.   (3) If you had an IV (intravenous) line placed, it was removed. Sometimes, after IV removal, that area can be tender for a few days; if it develops redness and swelling, those could be signs of infection; in which case, return to the Emergency Department for assessment.  (4) Please continue taking all of your home medications as directed.

## 2022-05-21 NOTE — ED PROVIDER NOTE - OBJECTIVE STATEMENT
32-year-old male ambulates independently, from [facility] home, PMHx of autism w/ moderate intellectual disability, asthma, hypothyroidism, obesity, pre DM, GERD, impulse control disorder, factitious disorder, urinary retention presenting with inability to urinate x 1 day. Has suprapubic pain, dizziness, no fevers/chills/flank pain, no other symptoms.

## 2022-05-21 NOTE — ED PROVIDER NOTE - PATIENT PORTAL LINK FT
You can access the FollowMyHealth Patient Portal offered by [facility] by registering at the following website: [email or web address]. By joining [employer]s FollowMyHealth portal, you will also be able to view your health information using other applications (apps) compatible with our system.

## 2022-05-21 NOTE — ED ADULT TRIAGE NOTE - CHIEF COMPLAINT QUOTE
He has difficulty urinating since this morning He has difficulty urinating since this morning.  Patient was evaluated and discharged this morning from this ED for same issue.

## 2022-05-22 ENCOUNTER — EMERGENCY (EMERGENCY)
Facility: HOSPITAL | Age: 32
LOS: 1 days | Discharge: HOME | End: 2022-05-22
Attending: EMERGENCY MEDICINE
Payer: MEDICAID

## 2022-05-22 VITALS
HEIGHT: 71 IN | OXYGEN SATURATION: 95 % | HEART RATE: 109 BPM | TEMPERATURE: 98 F | DIASTOLIC BLOOD PRESSURE: 83 MMHG | SYSTOLIC BLOOD PRESSURE: 122 MMHG | RESPIRATION RATE: 18 BRPM

## 2022-05-22 LAB
CULTURE RESULTS: NO GROWTH — SIGNIFICANT CHANGE UP
SPECIMEN SOURCE: SIGNIFICANT CHANGE UP

## 2022-05-22 PROCEDURE — 99283 EMERGENCY DEPT VISIT LOW MDM: CPT

## 2022-05-22 PROCEDURE — 73562 X-RAY EXAM OF KNEE 3: CPT

## 2022-05-22 PROCEDURE — 99283 EMERGENCY DEPT VISIT LOW MDM: CPT | Mod: 25

## 2022-05-22 PROCEDURE — 73562 X-RAY EXAM OF KNEE 3: CPT | Mod: 26,RT

## 2022-05-22 NOTE — ED ADULT NURSE NOTE - CHIEF COMPLAINT QUOTE
He has difficulty urinating since this morning.  Patient was evaluated and discharged this morning from this ED for same issue.

## 2022-05-22 NOTE — ED PROVIDER NOTE - NSFOLLOWUPINSTRUCTIONS_ED_ALL_ED_FT
Please follow up with your orthopedist doctor in 1-2 days.  Please use ibuprofen or acetaminophen as needed for pain.  Please keep the leg elevated, apply ice a few times a day.  Please return to the emergency department if you have worsening pain or swelling, numbness or weakness to your leg, fever, or any other symptoms.      Knee Pain    WHAT YOU NEED TO KNOW:    Knee pain may start suddenly, or it may be a long-term problem. You may have pain on the side, front, or back of your knee. You may have knee stiffness and swelling. You may hear popping sounds or feel like your knee is giving way or locking up as you walk. You may feel pain when you sit, stand, walk, or climb up and down stairs. Knee pain can be caused by conditions such as obesity, inflammation, or strains or tears in ligaments or tendons.     DISCHARGE INSTRUCTIONS:    Return to the emergency department if:   •Your pain is worse, even after treatment.   •You cannot bend or straighten your leg completely.   •The swelling around your knee does not go down even with treatment.  •Your knee is painful and hot to the touch.     Contact your healthcare provider if:   •You have questions or concerns about your condition or care.     Medicines: You may need any of the following:   •NSAIDs help decrease swelling and pain or fever. This medicine is available with or without a doctor's order. NSAIDs can cause stomach bleeding or kidney problems in certain people. If you take blood thinner medicine, always ask your healthcare provider if NSAIDs are safe for you. Always read the medicine label and follow directions.  •Acetaminophen decreases pain and fever. It is available without a doctor's order. Ask how much to take and how often to take it. Follow directions. Read the labels of all other medicines you are using to see if they also contain acetaminophen, or ask your doctor or pharmacist. Acetaminophen can cause liver damage if not taken correctly. Do not use more than 4 grams (4,000 milligrams) total of acetaminophen in one day.   •Prescription pain medicine may be given. Ask your healthcare provider how to take this medicine safely. Some prescription pain medicines contain acetaminophen. Do not take other medicines that contain acetaminophen without talking to your healthcare provider. Too much acetaminophen may cause liver damage. Prescription pain medicine may cause constipation. Ask your healthcare provider how to prevent or treat constipation.   •Take your medicine as directed. Contact your healthcare provider if you think your medicine is not helping or if you have side effects. Tell him or her if you are allergic to any medicine. Keep a list of the medicines, vitamins, and herbs you take. Include the amounts, and when and why you take them. Bring the list or the pill bottles to follow-up visits. Carry your medicine list with you in case of an emergency.    What you can do to manage your symptoms:   •Rest your knee so it can heal. Limit activities that increase your pain. Do low-impact exercises, such as walking or swimming.   •Apply ice to help reduce swelling and pain. Use an ice pack, or put crushed ice in a plastic bag. Cover it with a towel before you apply it to your knee. Apply ice for 15 to 20 minutes every hour, or as directed.  •Apply compression to help reduce swelling. Use a brace or bandage only as directed.  •Elevate your knee to help decrease pain and swelling. Elevate your knee while you are sitting or lying down. Prop your leg on pillows to keep your knee above the level of your heart.  •Prevent your knee from moving as directed. Your healthcare provider may put on a cast or splint. You may need to wear a leg brace to stabilize your knee. A leg brace can be adjusted to increase your range of motion as your knee heals.    What you can do to prevent knee pain:   •Maintain a healthy weight. Extra weight increases your risk for knee pain. Ask your healthcare provider how much you should weigh. He or she can help you create a safe weight loss plan if you need to lose weight.  •Exercise or train properly. Use the correct equipment for sports. Wear shoes that provide good support. Check your posture often as you exercise, play sports, or train for an event. This can help prevent stress and strain on your knees. Rest between sessions so you do not overwork your knees.    Follow up with your healthcare provider within 24 hours or as directed: You may need follow-up treatments, such as steroid injections to decrease pain. Write down your questions so you remember to ask them during your visits.

## 2022-05-22 NOTE — ED PROVIDER NOTE - DATE/TIME 1
Psychotherapy Provided: Individual Psychotherapy 45 minutes     Length of time in session: 45 minutes, follow up in 1 week    Goals addressed in session: Goal 1     Pain:      moderate to severe    5    Current suicide risk : Low     D- Bess stated that she has been busy with her grandchildren  She discussed upcoming family events and her anxiety regarding the events  Discussing ways to cope with being around people and ways to lower her stress level  Also discussing her ongoing frustration with her  and ways to communicate her feelings to him  Giving supportive therapy  A- Progress - Continuing to process her emotions  P-Continue treatment    [address]: Diagnosis and Treatment Plan explained to Swapna Rodas relates understanding diagnosis and is agreeable to Treatment Plan   Yes 22-May-2022 03:25

## 2022-05-22 NOTE — ED ADULT NURSE NOTE - OBJECTIVE STATEMENT
32yoM with h/o intellectual disability, autism spectrum, well known to the ED, presents with R knee pain. States he strained it while exercising 3 days ago. Has been ambulating independently. Denies all other acute symptoms.

## 2022-05-22 NOTE — ED PROVIDER NOTE - OBJECTIVE STATEMENT
32-year-old male ambulates independently, from [facility] home, PMHx of autism w/ moderate intellectual disability, asthma, hypothyroidism, obesity, pre DM, GERD, impulse control disorder, factitious disorder, urinary retention presenting with inability to urinate  again. pt was seen earlier today and dc

## 2022-05-22 NOTE — ED PROVIDER NOTE - PHYSICAL EXAMINATION
Afebrile, hemodynamically stable, saturating well  NAD, well appearing, sitting comfortably in bed, no WOB, speaking full sentences  Head NCAT  EOMI grossly, anicteric  MMM  Breathing comfortably on RA  AAO, CN's 3-12 grossly intact  SALAS spontaneously, full R knee ROM/flex/extension, nml independent gait, no leg cyanosis or edema, no swelling or discoloration  Skin warm, well perfused, no rashes or hives

## 2022-05-22 NOTE — ED PROVIDER NOTE - NSFOLLOWUPCLINICS_GEN_ALL_ED_FT
[facility]  Orthopedics  [address] [place] [address]  Phone: [phone]  Fax: [phone]  Follow Up Time: 1-3 Days

## 2022-05-22 NOTE — ED PROVIDER NOTE - OBJECTIVE STATEMENT
32yoM with h/o intellectual disability, autism spectrum, well known to the ED, presents with R knee pain. States he strained it while exercising 3 days ago. Has been ambulating independently. Denies all other acute symptoms. Complex Repair And Bilobe Flap Text: The defect edges were debeveled with a #15 scalpel blade.  The primary defect was closed partially with a complex linear closure.  Given the location of the remaining defect, shape of the defect and the proximity to free margins a bilobe flap was deemed most appropriate for complete closure of the defect.  Using a sterile surgical marker, an appropriate advancement flap was drawn incorporating the defect and placing the expected incisions within the relaxed skin tension lines where possible.    The area thus outlined was incised deep to adipose tissue with a #15 scalpel blade.  The skin margins were undermined to an appropriate distance in all directions utilizing iris scissors.

## 2022-05-22 NOTE — ED PROVIDER NOTE - PATIENT PORTAL LINK FT
You can access the FollowMyHealth Patient Portal offered by [facility] by registering at the following website: [email or web address]. By joining [employer]’s FollowMyHealth portal, you will also be able to view your health information using other applications (apps) compatible with our system.

## 2022-05-22 NOTE — ED ADULT NURSE NOTE - NS ED NOTE ABUSE RESPONSE YN
----- Message from Teresita Davila Blaire. Thanks!       Yes

## 2022-05-22 NOTE — ED PROVIDER NOTE - CLINICAL SUMMARY MEDICAL DECISION MAKING FREE TEXT BOX
No e/o direct trauma and ambulating independently, with low suspicion for fx, and Xray No e/o direct trauma and ambulating independently, with low suspicion for fx, and Xray negative. ACE wrapped. Patient is well appearing, NAD, afebrile, hemodynamically stable. Any available tests and studies were discussed with patient. Discharged with instructions in further symptomatic care, return precautions, and need for ortho f/u.

## 2022-05-22 NOTE — ED PROVIDER NOTE - CLINICAL SUMMARY MEDICAL DECISION MAKING FREE TEXT BOX
32-year-old male ambulates independently, from [facility] home, PMHx of autism w/ moderate intellectual disability, asthma, hypothyroidism, obesity, pre DM, GERD, impulse control disorder, factitious disorder, urinary retention presenting with inability to urinate  again. pt was seen earlier today and dc    bladder scan shows ~ 415ml. pt abd exam soft. no need to place mckeon. dc

## 2022-05-22 NOTE — ED PROVIDER NOTE - PATIENT PORTAL LINK FT
You can access the FollowMyHealth Patient Portal offered by [facility] by registering at the following website: [email or web address]. By joining [employer]’s FollowMyHealth portal, you will also be able to view your health information using other applications (apps) compatible with our system.

## 2022-05-26 NOTE — ED ADULT NURSE NOTE - NSFALLRSKASSESSDT_ED_ALL_ED
CRITICAL CARE ATTENDING - CTICU    MEDICATIONS  (STANDING):  aMIOdarone    Tablet   Oral   aMIOdarone    Tablet 200 milliGRAM(s) Oral daily  aMIOdarone Infusion 0.5 mG/Min (16.7 mL/Hr) IV Continuous <Continuous>  artificial tears (preservative free) Ophthalmic Solution 1 Drop(s) Both EYES every 3 hours  BACItracin   Ointment 1 Application(s) Topical two times a day  caspofungin IVPB 50 milliGRAM(s) IV Intermittent every 24 hours  cefepime   IVPB 1000 milliGRAM(s) IV Intermittent every 8 hours  chlorhexidine 0.12% Liquid 15 milliLiter(s) Oral Mucosa every 12 hours  chlorhexidine 2% Cloths 1 Application(s) Topical <User Schedule>  CRRT Treatment    <Continuous>  dexMEDEtomidine Infusion 0.7 MICROgram(s)/kG/Hr (20.8 mL/Hr) IV Continuous <Continuous>  DOBUTamine Infusion 5 MICROgram(s)/kG/Min (8.91 mL/Hr) IV Continuous <Continuous>  heparin  Infusion 1450 Unit(s)/Hr (14.5 mL/Hr) IV Continuous <Continuous>  heparin 50 Units/mL (IMPELLA VAD) Purge Solution  Unit(s) (10 mL/Hr) Ventricular Assist Device <Continuous>  hydrocortisone sodium succinate Injectable 25 milliGRAM(s) IV Push every 12 hours  insulin regular Infusion 9 Unit(s)/Hr (9 mL/Hr) IV Continuous <Continuous>  Nephro-vazquez 1 Tablet(s) Oral daily  pantoprazole  Injectable 40 milliGRAM(s) IV Push every 12 hours  petrolatum Ophthalmic Ointment 1 Application(s) Both EYES two times a day  Phoxillum Filtration BK 4 / 2.5 5000 milliLiter(s) (2000 mL/Hr) CRRT <Continuous>  polyethylene glycol 3350 17 Gram(s) Oral daily  PrismaSOL Filtration BGK 4 / 2.5 5000 milliLiter(s) (1200 mL/Hr) CRRT <Continuous>  PrismaSOL Filtration BGK 4 / 2.5 5000 milliLiter(s) (200 mL/Hr) CRRT <Continuous>  senna 2 Tablet(s) Oral at bedtime  sodium chloride 0.65% Nasal 1 Spray(s) Both Nostrils three times a day  vancomycin    Solution 125 milliGRAM(s) Oral every 12 hours  vancomycin  IVPB 500 milliGRAM(s) IV Intermittent every 12 hours  vasopressin Infusion 0.033 Unit(s)/Min (2 mL/Hr) IV Continuous <Continuous>                                    8.6    15.72 )-----------( 182      ( 26 May 2022 00:36 )             26.3           136  |  99  |  34<H>  ----------------------------<  128<H>  4.5   |  25  |  1.39<H>    Ca    8.7      26 May 2022 00:36  Phos  3.8       Mg     2.8         TPro  6.8  /  Alb  3.8  /  TBili  0.8  /  DBili  x   /  AST  22  /  ALT  32  /  AlkPhos  144<H>        PT/INR - ( 26 May 2022 00:36 )   PT: 14.4 sec;   INR: 1.24 ratio         PTT - ( 26 May 2022 00:36 )  PTT:55.8 sec    Mode: SIMV with PS  RR (machine): 12  FiO2: 40  PEEP: 8  PS: 10  ITime: 1  MAP: 11  PC: 15  PIP: 23      Daily     Daily Weight in k (26 May 2022 01:00)      24 @ 07:  -   @ 07:00  --------------------------------------------------------  IN: 4447.8 mL / OUT: 6605 mL / NET: -2157.2 mL     @ 07:  -   @ 06:19  --------------------------------------------------------  IN: 4397.6 mL / OUT: 5430 mL / NET: -1032.4 mL        Critically Ill patient  : [ ] preoperative ,   [x] post operative    Requires :  [x] Arterial Line   [x] Central Line  [x] PA catheter  [ ] IABP  [x] ECMO- VA  [x] Ventilator  [x] pacemaker- TPM [x] Impella.  [x] CVVHD                      [x ] ABG's     [ x] Pulse Oxymetry Monitoring  Bedside evaluation , monitoring , treatment of hemodynamics , fluids , IVP/ IVCD meds.        Diagnosis:      Tx from [facility] cardiogenic / septic shock, VA ECMO / Impella      Impella placement      failed ECMO wean     5/10 VA ECMO placed      -C3L/ MVR - post op bleeding / re exploration     Chest tube drainage     Requires chest PT, pulmonary toilet, ambu bagging, suctioning to maintain SaO2,  patent airway and treat atelectasis.     [place] Jorge catheter interpretation and therapeutic management of unstable hemodynamics     respiratory failure     Ventilator Management:  [x] PC / IMV -rest    [ ]CPAP-PS Wean    [ ]Trach Collar     [ ]Extubate    [ ] T-Piece  [X  I,]peep>5         +8    Difficult weaning process - multiple organ system involvement in critically ill patient     Sedated with IVCD Precedex for vent synchrony     Temporary pacemaker (TPM) interrogation and setting.     CHF- acute [ x]   chronic [ x]    systolic [ x]   diastolic [ ]          - Echo- EF -  20%           [ ] RV dysfunction          - Cxr-cardiomegally, edema          - Clinical-  [x]inotropes   [x] pressors   [ ]diuresis   [ ]IABP   [x]ECMO   [x]Impella   [x]Respiratory Failure.     Cardiogenic Shock / Septic shock    ECMO / Impella Circuit    IVCD anticoagulation with [x] Heparin  [ ] Argatroban for VA ECMO / Impella    Hemodynamic lability,  instability. Requires IVCD [x] vasopressors [x] inotropes  [ ] vasodilator  [x]IVSS fluid  to maintain MAP, perfusion, C.I.     Unstable AF - IVCD amiodarone     Renal Failure - Acute Kidney Injury     CVVHD - negative balance    IVCD Insulin    Hypotension     Hypovolemia     Tolerates NG / NJ feeds at [x] goal rate    [ ] trophic rate    [x] rate 45cc/hr     Obesity     Requires bedside physical therapy, mobilization and total jail care.             I, Juancho Rico, personally performed the services described in this documentation. All medical record entries made by the scribe were at my direction and in my presence. I have reviewed the chart and agree that the record reflects my personal performance and is accurate and complete.   Juancho Rico MD.       By signing my name below, I, Melodie Estrada, attest that this documentation has been prepared under the direction and in the presence of Juancho Rico MD.   Electronically Signed: Melodie Estrada Scribe 22 @ 06:19        Discussed with CT surgeon, Physician Assistant - Nurse Practitioner- Critical care medicine team.   Dicussed at  AM / PM rounds.   Chart, labs , films reviewed.    Cumulative Critical Care Time Given Today:  CRITICAL CARE ATTENDING - CTICU    MEDICATIONS  (STANDING):  aMIOdarone    Tablet   Oral   aMIOdarone    Tablet 200 milliGRAM(s) Oral daily  aMIOdarone Infusion 0.5 mG/Min (16.7 mL/Hr) IV Continuous <Continuous>  artificial tears (preservative free) Ophthalmic Solution 1 Drop(s) Both EYES every 3 hours  BACItracin   Ointment 1 Application(s) Topical two times a day  caspofungin IVPB 50 milliGRAM(s) IV Intermittent every 24 hours  cefepime   IVPB 1000 milliGRAM(s) IV Intermittent every 8 hours  chlorhexidine 0.12% Liquid 15 milliLiter(s) Oral Mucosa every 12 hours  chlorhexidine 2% Cloths 1 Application(s) Topical <User Schedule>  CRRT Treatment    <Continuous>  dexMEDEtomidine Infusion 0.7 MICROgram(s)/kG/Hr (20.8 mL/Hr) IV Continuous <Continuous>  DOBUTamine Infusion 5 MICROgram(s)/kG/Min (8.91 mL/Hr) IV Continuous <Continuous>  heparin  Infusion 1450 Unit(s)/Hr (14.5 mL/Hr) IV Continuous <Continuous>  heparin 50 Units/mL (IMPELLA VAD) Purge Solution  Unit(s) (10 mL/Hr) Ventricular Assist Device <Continuous>  hydrocortisone sodium succinate Injectable 25 milliGRAM(s) IV Push every 12 hours  insulin regular Infusion 9 Unit(s)/Hr (9 mL/Hr) IV Continuous <Continuous>  Nephro-vazquez 1 Tablet(s) Oral daily  pantoprazole  Injectable 40 milliGRAM(s) IV Push every 12 hours  petrolatum Ophthalmic Ointment 1 Application(s) Both EYES two times a day  Phoxillum Filtration BK 4 / 2.5 5000 milliLiter(s) (2000 mL/Hr) CRRT <Continuous>  polyethylene glycol 3350 17 Gram(s) Oral daily  PrismaSOL Filtration BGK 4 / 2.5 5000 milliLiter(s) (1200 mL/Hr) CRRT <Continuous>  PrismaSOL Filtration BGK 4 / 2.5 5000 milliLiter(s) (200 mL/Hr) CRRT <Continuous>  senna 2 Tablet(s) Oral at bedtime  sodium chloride 0.65% Nasal 1 Spray(s) Both Nostrils three times a day  vancomycin    Solution 125 milliGRAM(s) Oral every 12 hours  vancomycin  IVPB 500 milliGRAM(s) IV Intermittent every 12 hours  vasopressin Infusion 0.033 Unit(s)/Min (2 mL/Hr) IV Continuous <Continuous>                                    8.6    15.72 )-----------( 182      ( 26 May 2022 00:36 )             26.3           136  |  99  |  34<H>  ----------------------------<  128<H>  4.5   |  25  |  1.39<H>    Ca    8.7      26 May 2022 00:36  Phos  3.8       Mg     2.8         TPro  6.8  /  Alb  3.8  /  TBili  0.8  /  DBili  x   /  AST  22  /  ALT  32  /  AlkPhos  144<H>        PT/INR - ( 26 May 2022 00:36 )   PT: 14.4 sec;   INR: 1.24 ratio         PTT - ( 26 May 2022 00:36 )  PTT:55.8 sec    Mode: SIMV with PS  RR (machine): 12  FiO2: 40  PEEP: 8  PS: 10  ITime: 1  MAP: 11  PC: 15  PIP: 23      Daily     Daily Weight in k (26 May 2022 01:00)      24 @ 07:  -   @ 07:00  --------------------------------------------------------  IN: 4447.8 mL / OUT: 6605 mL / NET: -2157.2 mL     @ 07:  -   @ 06:19  --------------------------------------------------------  IN: 4397.6 mL / OUT: 5430 mL / NET: -1032.4 mL        Critically Ill patient  : [ ] preoperative ,   [x] post operative    Requires :  [x] Arterial Line   [x] Central Line  [x] PA catheter  [ ] IABP  [x] ECMO- VA  [x] Ventilator  [x] pacemaker- TPM [x] Impella.  [x] CVVHD                      [x ] ABG's     [ x] Pulse Oxymetry Monitoring  Bedside evaluation , monitoring , treatment of hemodynamics , fluids , IVP/ IVCD meds.        Diagnosis:      Tx from [facility] cardiogenic /  shock, VA ECMO / Impella      Impella placement      failed ECMO wean     5/10 VA ECMO placed      -C3L/ MVR - post op bleeding / re exploration     Chest tube drainage     Requires chest PT, pulmonary toilet, ambu bagging, suctioning to maintain SaO2,  patent airway and treat atelectasis.     [place] Jorge catheter interpretation and therapeutic management of unstable hemodynamics     respiratory failure     Ventilator Management:  [x] PC / IMV -rest    [ ]CPAP-PS Wean    [ ]Trach Collar     [ ]Extubate    [ ] T-Piece  [X  I,]peep>5         +8    Difficult weaning process - multiple organ system involvement in critically ill patient     Sedated with IVCD Precedex for vent synchrony     Temporary pacemaker (TPM) interrogation and setting.     CHF- acute [ x]   chronic [ x]    systolic [ x]   diastolic [ ]          - Echo- EF -  20%           [ ] RV dysfunction          - Cxr-cardiomegally, edema          - Clinical-  [x]inotropes   [x] pressors   [ ]diuresis   [ ]IABP   [x]ECMO   [x]Impella   [x]Respiratory Failure.     Cardiogenic Shock     ECMO / Impella Circuit    IVCD anticoagulation with [x] Heparin  [ ] Argatroban for VA ECMO / Impella    Hemodynamic lability,  instability. Requires IVCD [x] vasopressors [x] inotropes  [ ] vasodilator  [x]IVSS fluid  to maintain MAP, perfusion, C.I.     Unstable AF - IVCD amiodarone     Renal Failure - Acute Kidney Injury     CVVHD - negative balance    IVCD Insulin    Hypotension     Hypovolemia     Tolerates NG / NJ feeds at [x] goal rate    [ ] trophic rate    [x] rate 45cc/hr     Obesity     Requires bedside physical therapy, mobilization and total jail care.             I, Juancho Rico, personally performed the services described in this documentation. All medical record entries made by the scribe were at my direction and in my presence. I have reviewed the chart and agree that the record reflects my personal performance and is accurate and complete.   Juancho Rico MD.       By signing my name below, I, Melodie Estrada, attest that this documentation has been prepared under the direction and in the presence of Juancho Rico MD.   Electronically Signed: Melodie Estrada Scribe 22 @ 06:19        Discussed with CT surgeon, Physician Assistant - Nurse Practitioner- Critical care medicine team.   Dicussed at  AM / PM rounds.   Chart, labs , films reviewed.    Cumulative Critical Care Time Given Today:  90 min 12-May-2019 17:02

## 2022-05-30 PROCEDURE — 81003 URINALYSIS AUTO W/O SCOPE: CPT

## 2022-05-30 PROCEDURE — 93005 ELECTROCARDIOGRAM TRACING: CPT

## 2022-05-30 PROCEDURE — 36415 COLL VENOUS BLD VENIPUNCTURE: CPT

## 2022-05-30 PROCEDURE — 84443 ASSAY THYROID STIM HORMONE: CPT

## 2022-05-30 PROCEDURE — 87635 SARS-COV-2 COVID-19 AMP PRB: CPT

## 2022-05-30 PROCEDURE — 83735 ASSAY OF MAGNESIUM: CPT

## 2022-05-30 PROCEDURE — 80307 DRUG TEST PRSMV CHEM ANLYZR: CPT

## 2022-05-30 PROCEDURE — 80164 ASSAY DIPROPYLACETIC ACD TOT: CPT

## 2022-05-30 PROCEDURE — 87086 URINE CULTURE/COLONY COUNT: CPT

## 2022-05-30 PROCEDURE — 84100 ASSAY OF PHOSPHORUS: CPT

## 2022-05-30 PROCEDURE — 85027 COMPLETE CBC AUTOMATED: CPT

## 2022-05-30 PROCEDURE — 84484 ASSAY OF TROPONIN QUANT: CPT

## 2022-05-30 PROCEDURE — 99285 EMERGENCY DEPT VISIT HI MDM: CPT

## 2022-05-30 PROCEDURE — 80053 COMPREHEN METABOLIC PANEL: CPT

## 2022-05-30 PROCEDURE — 85025 COMPLETE CBC W/AUTO DIFF WBC: CPT

## 2022-05-30 PROCEDURE — 71045 X-RAY EXAM CHEST 1 VIEW: CPT

## 2022-05-31 ENCOUNTER — EMERGENCY (EMERGENCY)
Facility: HOSPITAL | Age: 32
LOS: 1 days | Discharge: HOME | End: 2022-05-31
Attending: EMERGENCY MEDICINE
Payer: MEDICAID

## 2022-05-31 VITALS
SYSTOLIC BLOOD PRESSURE: 118 MMHG | WEIGHT: 279.99 LBS | DIASTOLIC BLOOD PRESSURE: 78 MMHG | TEMPERATURE: 98 F | OXYGEN SATURATION: 98 % | HEART RATE: 86 BPM | RESPIRATION RATE: 17 BRPM | HEIGHT: 71 IN

## 2022-05-31 PROCEDURE — 99283 EMERGENCY DEPT VISIT LOW MDM: CPT

## 2022-05-31 PROCEDURE — 99284 EMERGENCY DEPT VISIT MOD MDM: CPT

## 2022-05-31 RX ORDER — IBUPROFEN 200 MG
600 TABLET ORAL ONCE
Refills: 0 | Status: COMPLETED | OUTPATIENT
Start: 2022-05-31 | End: 2022-05-31

## 2022-05-31 RX ORDER — KETOROLAC TROMETHAMINE 30 MG/ML
15 SYRINGE (ML) INJECTION ONCE
Refills: 0 | Status: DISCONTINUED | OUTPATIENT
Start: 2022-05-31 | End: 2022-05-31

## 2022-05-31 RX ADMIN — Medication 600 MILLIGRAM(S): at 21:27

## 2022-05-31 RX ADMIN — Medication 30 MILLILITER(S): at 21:27

## 2022-05-31 NOTE — ED PROVIDER NOTE - NS ED ROS FT
CONSTITUTIONAL: no fever  EYES: no eye pain   ENMT: no throat pain  CARDIOVASCULAR: no chest pain   RESPIRATORY: no shortness of breath   GASTROINTESTINAL: +abdominal pain   GENITOURINARY: no dysuria   SKIN: no rashes  NEUROLOGICAL: no headache

## 2022-05-31 NOTE — ED PROVIDER NOTE - PATIENT PORTAL LINK FT
You can access the FollowMyHealth Patient Portal offered by [facility] by registering at the following website: [email or web address]. By joining [employer]’s FollowMyHealth portal, you will also be able to view your health information using other applications (apps) compatible with our system.

## 2022-05-31 NOTE — ED PROVIDER NOTE - PHYSICAL EXAMINATION
GENERAL: well appearing, no acute distress   HEAD: atraumatic   EYES: EOMI   ENT: moist oral mucosa   CARDIAC: regular rate  RESPIRATORY: no increased work of breathing   ABDO: soft NTND  MUSCULOSKELETAL: no deformity   NEUROLOGICAL: alert, spontaneous movement of extremities   SKIN: no visible rash  PSYCHIATRIC: cooperative

## 2022-05-31 NOTE — ED ADULT NURSE NOTE - OBJECTIVE STATEMENT
31 yo M well known to ED c/o abdo pain. Per pt, he has hard stool and only had 1 BM today. Per staff at bedside, pt was complaining of diarrhea earlier. Denies other acute complaints.

## 2022-05-31 NOTE — ED PROVIDER NOTE - OBJECTIVE STATEMENT
33 yo M well known to ED c/o abdo pain. Per pt, he has hard stool and only had 1 BM today. Per staff at bedside, pt was complaining of diarrhea earlier. Denies other acute complaints.

## 2022-05-31 NOTE — ED PROVIDER NOTE - CLINICAL SUMMARY MEDICAL DECISION MAKING FREE TEXT BOX
31 yo M, frequent ED visits, c/o constipation to me. Apparently c/o diarrhea to group home staff. VS girma. Ranjan arellano. Will give meds and dc.

## 2022-06-04 ENCOUNTER — EMERGENCY (EMERGENCY)
Facility: HOSPITAL | Age: 32
LOS: 1 days | Discharge: HOME | End: 2022-06-04
Attending: STUDENT IN AN ORGANIZED HEALTH CARE EDUCATION/TRAINING PROGRAM
Payer: MEDICAID

## 2022-06-04 VITALS
HEART RATE: 97 BPM | DIASTOLIC BLOOD PRESSURE: 90 MMHG | HEIGHT: 71 IN | OXYGEN SATURATION: 95 % | RESPIRATION RATE: 18 BRPM | SYSTOLIC BLOOD PRESSURE: 118 MMHG | TEMPERATURE: 99 F

## 2022-06-04 PROCEDURE — 99283 EMERGENCY DEPT VISIT LOW MDM: CPT

## 2022-06-04 PROCEDURE — 99282 EMERGENCY DEPT VISIT SF MDM: CPT

## 2022-06-04 NOTE — ED PROVIDER NOTE - CLINICAL SUMMARY MEDICAL DECISION MAKING FREE TEXT BOX
Pt p/w pain in the L 5th digit after punching something in the group home. Pt has no bony TTP nor any signs of trauma. No indication for imaging. Pt then states that he wants to punch me in the face and that he "will find me on the street." Pt being removed by security. Pt p/w pain in the L 5th digit after punching something in the group home. Pt has no bony TTP nor any signs of trauma. No indication for imaging. After informing pt that his finger will not require any imaging then states that he wants to punch me in the face and stating "I'm going to find you on the street, I know where you work." Pt being removed by security.

## 2022-06-04 NOTE — ED PROVIDER NOTE - PATIENT PORTAL LINK FT
You can access the FollowMyHealth Patient Portal offered by [facility] by registering at the following website: [email or web address]. By joining [employer]’s FollowMyHealth portal, you will also be able to view your health information using other applications (apps) compatible with our system.

## 2022-06-04 NOTE — ED PROVIDER NOTE - OBJECTIVE STATEMENT
31 yo M h/o autism spectrum, behavioral issues p/w L 5th digit pain after "punching something at home." Pt felt well before the event and denies head trauma, LOC, vomiting, other pain/injuries, etoh/drug use, fever/infectious symptoms, severe headache or neck stiffness, focal numbness/weakness, other recent illness or hospitalizations. 19

## 2022-06-04 NOTE — ED ADULT NURSE NOTE - OBJECTIVE STATEMENT
the patient  is a 32 y male complaining that his left hand little finger get hurt ,s/p hitting the wall when he got angry . he is fighting with DR. GARDNER . calling names , using bad language

## 2022-06-04 NOTE — ED PROVIDER NOTE - PHYSICAL EXAMINATION
Vital Signs Reviewed  GEN: Comfortable, NAD, AAOx3  HEENT: NCAT, MMM, Neck Supple  RESP: CTAB, No rales/rhonchi/wheezing  CV: RRR, S1S2, No murmurs  ABD: No TTP, Soft, ND, No masses, No CVA Tenderness  Extrem/Skin: No TTP nor any acute signs of trauma of the L hand or L fingers, Equal pulses bilat, No cyanosis/edema/rashes  Neuro: No focal deficits

## 2022-06-14 ENCOUNTER — EMERGENCY (EMERGENCY)
Facility: HOSPITAL | Age: 32
LOS: 1 days | Discharge: HOME | End: 2022-06-14
Attending: EMERGENCY MEDICINE
Payer: MEDICAID

## 2022-06-14 VITALS
HEART RATE: 88 BPM | DIASTOLIC BLOOD PRESSURE: 92 MMHG | OXYGEN SATURATION: 98 % | HEIGHT: 71 IN | SYSTOLIC BLOOD PRESSURE: 159 MMHG | RESPIRATION RATE: 18 BRPM | TEMPERATURE: 98 F

## 2022-06-14 PROCEDURE — 99283 EMERGENCY DEPT VISIT LOW MDM: CPT

## 2022-06-14 PROCEDURE — 82962 GLUCOSE BLOOD TEST: CPT

## 2022-06-14 RX ORDER — CLOTRIMAZOLE AND BETAMETHASONE DIPROPIONATE 10; .5 MG/G; MG/G
1 CREAM TOPICAL
Qty: 2 | Refills: 0
Start: 2022-06-14 | End: 2022-06-23

## 2022-06-14 RX ORDER — CLOTRIMAZOLE AND BETAMETHASONE DIPROPIONATE 10; .5 MG/G; MG/G
1 CREAM TOPICAL ONCE
Refills: 0 | Status: COMPLETED | OUTPATIENT
Start: 2022-06-14 | End: 2022-06-14

## 2022-06-14 RX ADMIN — CLOTRIMAZOLE AND BETAMETHASONE DIPROPIONATE 1 APPLICATION(S): 10; .5 CREAM TOPICAL at 21:55

## 2022-06-14 NOTE — ED PROVIDER NOTE - PATIENT PORTAL LINK FT
You can access the FollowMyHealth Patient Portal offered by [facility] by registering at the following website: [email or web address]. By joining [employer]’s FollowMyHealth portal, you will also be able to view your health information using other applications (apps) compatible with our system.

## 2022-06-14 NOTE — ED PROVIDER NOTE - NSFOLLOWUPINSTRUCTIONS_ED_ALL_ED_FT
Jock Itch      Jock itch (tinea cruris) is an infection of the skin in the groin area that is caused by a fungus. Jock itch causes an itchy rash in the groin and upper thigh area. It usually goes away in 2–3 weeks with treatment.      What are the causes?    The fungus that causes jock itch may be spread by:  •Touching a fungal infection elsewhere on your body, such as athlete's foot, and then touching your groin area.      •Sharing towels or clothing, such as socks or shoes, with someone who has a fungal infection.        What increases the risk?    Jock itch is most common in men and adolescent boys. You are also more likely to develop the condition if you:  •Are in a hot, humid climate.      •Wear tight-fitting clothing or wet bathing suits for long periods of time.      •Play sports.      •Are overweight.      •Have diabetes.      •Have a weakened body defense system (immune system).      •Sweat a lot.        What are the signs or symptoms?  Outline of a male's lower body with a close-up view of a rash in the groin area.    Symptoms of jock itch may include:  •A red, pink, or brown rash in the groin area. Blisters may be present. The rash may spread to the thighs, the opening between the buttocks (anus), and the buttocks.      •Dry and scaly skin on or around the rash.      •Itchiness.        How is this diagnosed?    In most cases, your health care provider can make the diagnosis by looking at your rash. In some cases, a sample of infected skin may be scraped off. This sample may be examined under a microscope (biopsy) or by trying to grow the fungus from the sample (culture).      How is this treated?    Treatment for this condition may include:  •Antifungal medicine to kill the fungus. This may be a skin cream, ointment, or powder, or it may be a medicine that you take by mouth (orally).      •Skin cream or ointment to reduce itching.      •Lifestyle changes, such as wearing looser clothing and caring for your skin.        Follow these instructions at home:    Skin care     •Apply skin creams, ointments, or powders exactly as told by your health care provider.      •Wear loose-fitting clothing that does not rub against your groin area. Men should wear boxer shorts or loose-fitting underwear.    •Keep your groin area clean and dry.  •Change your underwear every day.      •Change out of wet bathing suits as soon as possible.      •After bathing, use a separate towel to gently dry your groin area thoroughly. Using a separate towel will help prevent spreading the infection to other parts of your body.        •Avoid hot baths and showers. Hot water can make itching worse.      • Do not scratch the affected area.      General instructions     •Take and apply over-the-counter and prescription medicines only as told by your health care provider.      • Do not share towels, clothing, or personal items with other people.      •Wash your hands often with soap and water for at least 20 seconds, especially after touching your groin area. If soap and water are not available, use hand .    •When at the gym:  •Always wear shoes, especially in the shower and around the swimming pool.      •Keep any cuts covered.      •Disinfect any mats or equipment before using them.      •Shower immediately after working out.        •Keep all follow-up visits. This is important.        Contact a health care provider if:  •Your rash:  •Gets worse or does not get better after 2 weeks of treatment.      •Spreads.      •Returns after treatment is finished.      •You have any of the following:  •A fever.      •New or worsening redness, swelling, or pain around your rash.      •Fluid, blood, or pus coming from your rash.          Summary    •Jock itch (tinea cruris) is a fungal infection of the skin in the groin area.      •The fungus can be spread by sharing clothing or by touching a fungus infection elsewhere on your body and then touching your groin area.      •Treatment may include antifungal medicine and lifestyle changes, such as keeping the area clean and dry.      This information is not intended to replace advice given to you by your health care provider. Make sure you discuss any questions you have with your health care provider.

## 2022-06-14 NOTE — ED PROVIDER NOTE - OBJECTIVE STATEMENT
32 year old male with PMHx of autism spectrum presents to the ED with complaints of 2 weeks of an itchy rash to perineal area. The patient stated that he scratched the area so much that he experiences occasional bleeding. The patient hasn't taken any medications, and denies any fevers, abdominal pain, swelling, or trauma. The patient is accompanied by his aid, Sofia, who stated that the patient often presents to the ED in an effort to get admitted.   Allergies: Haldol(dystonic reaction) and Zyprexa (dyskinesia)

## 2022-06-14 NOTE — ED ADULT TRIAGE NOTE - CHIEF COMPLAINT QUOTE
c/o rash with bleeding to b/l groin, perineal area ,buttock x 2 wks c/o rash with bleeding to b/l groin, perineal area ,buttocks x 2 wks c/o bleeding rash on b/l groin, perineal area ,buttocks x 2 wks

## 2022-06-14 NOTE — ED PROVIDER NOTE - GASTROINTESTINAL, MLM
Abdomen soft, non-tender, no guarding. Perirectal erythema with scaling and excoriations. No induration, no warmth Abdomen soft, non-tender, no guarding. Well demarcated perirectal erythema with scaling and excoriations. No blood or acute bleeding.  Rectal exam neg blood. No induration, no warmth

## 2022-06-14 NOTE — ED PROVIDER NOTE - CLINICAL SUMMARY MEDICAL DECISION MAKING FREE TEXT BOX
32 year old male with itchy rash to perineal area, suspected candidal infection. Will check fingerstick and prescribe antifungal cream. 32 year old male with itchy rash to perineal area, suspected fungal infection. Will check fingerstick and prescribe antifungal cream.

## 2022-06-16 ENCOUNTER — EMERGENCY (EMERGENCY)
Facility: HOSPITAL | Age: 32
LOS: 1 days | Discharge: HOME | End: 2022-06-16
Attending: EMERGENCY MEDICINE
Payer: MEDICAID

## 2022-06-16 ENCOUNTER — EMERGENCY (EMERGENCY)
Facility: HOSPITAL | Age: 32
LOS: 1 days | Discharge: HOME | End: 2022-06-16
Attending: EMERGENCY MEDICINE

## 2022-06-16 VITALS
DIASTOLIC BLOOD PRESSURE: 108 MMHG | OXYGEN SATURATION: 95 % | HEIGHT: 71 IN | HEART RATE: 105 BPM | RESPIRATION RATE: 20 BRPM | TEMPERATURE: 97 F | SYSTOLIC BLOOD PRESSURE: 143 MMHG

## 2022-06-16 VITALS
RESPIRATION RATE: 18 BRPM | TEMPERATURE: 98 F | DIASTOLIC BLOOD PRESSURE: 75 MMHG | OXYGEN SATURATION: 96 % | SYSTOLIC BLOOD PRESSURE: 112 MMHG | HEART RATE: 99 BPM

## 2022-06-16 VITALS
WEIGHT: 291.01 LBS | HEART RATE: 100 BPM | TEMPERATURE: 98 F | DIASTOLIC BLOOD PRESSURE: 78 MMHG | HEIGHT: 71 IN | SYSTOLIC BLOOD PRESSURE: 103 MMHG | OXYGEN SATURATION: 96 % | RESPIRATION RATE: 16 BRPM

## 2022-06-16 LAB
ALBUMIN SERPL ELPH-MCNC: 3.4 G/DL — LOW (ref 3.5–5)
ALP SERPL-CCNC: 86 U/L — SIGNIFICANT CHANGE UP (ref 40–120)
ALT FLD-CCNC: 86 U/L DA — HIGH (ref 10–60)
ANION GAP SERPL CALC-SCNC: 6 MMOL/L — SIGNIFICANT CHANGE UP (ref 5–17)
APTT BLD: 31.1 SEC — SIGNIFICANT CHANGE UP (ref 27.5–35.5)
AST SERPL-CCNC: 42 U/L — HIGH (ref 10–40)
BASOPHILS # BLD AUTO: 0.05 K/UL — SIGNIFICANT CHANGE UP (ref 0–0.2)
BASOPHILS NFR BLD AUTO: 0.7 % — SIGNIFICANT CHANGE UP (ref 0–2)
BILIRUB SERPL-MCNC: 0.2 MG/DL — SIGNIFICANT CHANGE UP (ref 0.2–1.2)
BUN SERPL-MCNC: 15 MG/DL — SIGNIFICANT CHANGE UP (ref 7–18)
CALCIUM SERPL-MCNC: 8.9 MG/DL — SIGNIFICANT CHANGE UP (ref 8.4–10.5)
CHLORIDE SERPL-SCNC: 105 MMOL/L — SIGNIFICANT CHANGE UP (ref 96–108)
CO2 SERPL-SCNC: 27 MMOL/L — SIGNIFICANT CHANGE UP (ref 22–31)
CREAT SERPL-MCNC: 0.92 MG/DL — SIGNIFICANT CHANGE UP (ref 0.5–1.3)
EGFR: 113 ML/MIN/1.73M2 — SIGNIFICANT CHANGE UP
EOSINOPHIL # BLD AUTO: 0.33 K/UL — SIGNIFICANT CHANGE UP (ref 0–0.5)
EOSINOPHIL NFR BLD AUTO: 4.4 % — SIGNIFICANT CHANGE UP (ref 0–6)
GLUCOSE SERPL-MCNC: 121 MG/DL — HIGH (ref 70–99)
HCT VFR BLD CALC: 45.2 % — SIGNIFICANT CHANGE UP (ref 39–50)
HGB BLD-MCNC: 14.3 G/DL — SIGNIFICANT CHANGE UP (ref 13–17)
IMM GRANULOCYTES NFR BLD AUTO: 0.5 % — SIGNIFICANT CHANGE UP (ref 0–1.5)
INR BLD: 0.94 RATIO — SIGNIFICANT CHANGE UP (ref 0.88–1.16)
LYMPHOCYTES # BLD AUTO: 2.13 K/UL — SIGNIFICANT CHANGE UP (ref 1–3.3)
LYMPHOCYTES # BLD AUTO: 28.6 % — SIGNIFICANT CHANGE UP (ref 13–44)
MCHC RBC-ENTMCNC: 24.5 PG — LOW (ref 27–34)
MCHC RBC-ENTMCNC: 31.6 GM/DL — LOW (ref 32–36)
MCV RBC AUTO: 77.4 FL — LOW (ref 80–100)
MONOCYTES # BLD AUTO: 0.57 K/UL — SIGNIFICANT CHANGE UP (ref 0–0.9)
MONOCYTES NFR BLD AUTO: 7.6 % — SIGNIFICANT CHANGE UP (ref 2–14)
NEUTROPHILS # BLD AUTO: 4.34 K/UL — SIGNIFICANT CHANGE UP (ref 1.8–7.4)
NEUTROPHILS NFR BLD AUTO: 58.2 % — SIGNIFICANT CHANGE UP (ref 43–77)
NRBC # BLD: 0 /100 WBCS — SIGNIFICANT CHANGE UP (ref 0–0)
PLATELET # BLD AUTO: 220 K/UL — SIGNIFICANT CHANGE UP (ref 150–400)
POTASSIUM SERPL-MCNC: 4.4 MMOL/L — SIGNIFICANT CHANGE UP (ref 3.5–5.3)
POTASSIUM SERPL-SCNC: 4.4 MMOL/L — SIGNIFICANT CHANGE UP (ref 3.5–5.3)
PROT SERPL-MCNC: 6.9 G/DL — SIGNIFICANT CHANGE UP (ref 6–8.3)
PROTHROM AB SERPL-ACNC: 11.2 SEC — SIGNIFICANT CHANGE UP (ref 10.5–13.4)
RBC # BLD: 5.84 M/UL — HIGH (ref 4.2–5.8)
RBC # FLD: 15.9 % — HIGH (ref 10.3–14.5)
SODIUM SERPL-SCNC: 138 MMOL/L — SIGNIFICANT CHANGE UP (ref 135–145)
WBC # BLD: 7.46 K/UL — SIGNIFICANT CHANGE UP (ref 3.8–10.5)
WBC # FLD AUTO: 7.46 K/UL — SIGNIFICANT CHANGE UP (ref 3.8–10.5)

## 2022-06-16 PROCEDURE — 99283 EMERGENCY DEPT VISIT LOW MDM: CPT

## 2022-06-16 PROCEDURE — 80053 COMPREHEN METABOLIC PANEL: CPT

## 2022-06-16 PROCEDURE — 99284 EMERGENCY DEPT VISIT MOD MDM: CPT

## 2022-06-16 PROCEDURE — 86850 RBC ANTIBODY SCREEN: CPT

## 2022-06-16 PROCEDURE — 85730 THROMBOPLASTIN TIME PARTIAL: CPT

## 2022-06-16 PROCEDURE — 86901 BLOOD TYPING SEROLOGIC RH(D): CPT

## 2022-06-16 PROCEDURE — 99282 EMERGENCY DEPT VISIT SF MDM: CPT

## 2022-06-16 PROCEDURE — 36415 COLL VENOUS BLD VENIPUNCTURE: CPT

## 2022-06-16 PROCEDURE — 85025 COMPLETE CBC W/AUTO DIFF WBC: CPT

## 2022-06-16 PROCEDURE — 86900 BLOOD TYPING SEROLOGIC ABO: CPT

## 2022-06-16 PROCEDURE — 85610 PROTHROMBIN TIME: CPT

## 2022-06-16 NOTE — ED PROVIDER NOTE - OBJECTIVE STATEMENT
32 year old male with PMHX of autism, dyslipidemia, enuresis, factitious disorder, GERD, BPH, HLD, and hypothyroidism presents to the ED with complaints of rectal bleeding, anal pain, and mild abdominal pain over the past few days. Patient denies any vomiting, diarrhea, or fevers. Patient notably has blood stains on his pants. Patient endorses that he does not have any history of similar symptoms in the past. Patient states that  he was recently seen for similar complaints.  Allergies: Zyprexa (dystonic reaction), haldol (dyskinesia)

## 2022-06-16 NOTE — ED PROVIDER NOTE - CLINICAL SUMMARY MEDICAL DECISION MAKING FREE TEXT BOX
Will check basic blood work, perform CT scan, and reassess. Will refer patient to a GI specialist if workup is negative. Discussed case with a staff member of patient's facility.

## 2022-06-16 NOTE — ED PROVIDER NOTE - GASTROINTESTINAL, MLM
Mild diffuse abdominal tenderness to palpation. Rectal exam performed revealing no blood, positive rectal tenderness with no masses or hemorrhoids. Pants notably stained with blood.

## 2022-06-16 NOTE — ED ADULT NURSE NOTE - NSSEPSISSUSPECTED_ED_A_ED
Clindamycin Counseling: I counseled the patient regarding use of clindamycin as an antibiotic for prophylactic and/or therapeutic purposes. Clindamycin is active against numerous classes of bacteria, including skin bacteria. Side effects may include nausea, diarrhea, gastrointestinal upset, rash, hives, yeast infections, and in rare cases, colitis. No

## 2022-06-16 NOTE — ED ADULT NURSE NOTE - OBJECTIVE STATEMENT
Patient BIB EMS from group home with c/o rectal bleeding, rectal pain for the past 2 days, denies abdominal pain, nausea, vomiting, diarrhea, constipation.

## 2022-06-16 NOTE — ED ADULT TRIAGE NOTE - CHIEF COMPLAINT QUOTE
Addended by: ARIC ROQUE on: 9/4/2020 03:08 PM     Modules accepted: Orders    
biba ambulatory from [facility] with c/o pain to lower abdomen

## 2022-06-16 NOTE — ED PROVIDER NOTE - PROGRESS NOTE DETAILS
(Sarabia) - s/o from Dr Miller to fu work up  Hgb stable  Abdo soft non tender. Asking for food. No further bleeding. CT not indicated  Dc to facility

## 2022-06-16 NOTE — ED ADULT NURSE NOTE - NS ED NURSE LEVEL OF CONSCIOUSNESS MENTAL STATUS
Has been treated for the last week for kidney infection.  Is not getting better, still having high fevers.  
Cooperative

## 2022-06-16 NOTE — ED PROVIDER NOTE - PATIENT PORTAL LINK FT
You can access the FollowMyHealth Patient Portal offered by [facility] by registering at the following website: [email or web address]. By joining [employer]’s FollowMyHealth portal, you will also be able to view your health information using other applications (apps) compatible with our system.

## 2022-06-16 NOTE — ED ADULT TRIAGE NOTE - ISOLATION TYPE:
Bedside and Verbal shift change report given to A Eleno RN (oncoming nurse) by Jud Rivera (offgoing nurse). Report included the following information SBAR, Kardex and ED Summary. 1113- trop sent. 1150- Repeat trop negative    1345- Orders received for discharge. Patient does not have ride home, case management notified.
None

## 2022-06-17 NOTE — ED PROVIDER NOTE - PATIENT PORTAL LINK FT
You can access the FollowMyHealth Patient Portal offered by [facility] by registering at the following website: [email or web address]. By joining [employer]’s FollowMyHealth portal, you will also be able to view your health information using other applications (apps) compatible with our system.

## 2022-06-17 NOTE — ED PROVIDER NOTE - NSFOLLOWUPINSTRUCTIONS_ED_ALL_ED_FT
[facility]                                                                                                    Acute Urinary Retention, Male       Acute urinary retention is a condition in which a person is unable to pass urine or can only pass a little urine. This condition can happen suddenly and last for a short time. If left untreated, it can become long-term (chronic) and result in kidney damage or other serious complications.      What are the causes?    This condition may be caused by:  •Obstruction or narrowing of the tube that drains the bladder (urethra). This may be caused by surgery, problems with nearby organs, or injury to the bladder or urethra.      •Problems with the nerves in the bladder.      •Tumors in the area of the pelvis, bladder, or urethra.      •Certain medicines.      •Bladder or urinary tract infection.      •Constipation.        What increases the risk?    This condition is more likely to develop in older men. As men age, their prostate may become larger and may start to press or squeeze on the bladder or the urethra. Other chronic health conditions can increase the risk of acute urinary retention. These include:  •Diseases such as multiple sclerosis.      •Spinal cord injuries.      •Diabetes.      •Degenerative cognitive conditions, such as delirium or dementia.      •Psychological conditions. A man may hold his urine due to trauma or because he does not want to use the bathroom.        What are the signs or symptoms?    Symptoms of this condition include:  •Trouble urinating.      •Pain in the lower abdomen.        How is this diagnosed?    This condition is diagnosed based on a physical exam and your medical history. You may also have other tests, including:  •An ultrasound of the bladder or kidneys or both.      •Blood tests.      •A urine analysis.      •Additional tests may be needed, such as a CT scan, MRI, and kidney or bladder function tests.        How is this treated?    Treatment for this condition may include:  •Medicines.      •Placing a thin, sterile tube (catheter) into the bladder to drain urine out of the body. This is called an indwelling urinary catheter. After it is inserted, the catheter is held in place with a small balloon that is filled with sterile water. Urine drains from the catheter into a collection bag outside of the body.      •Behavioral therapy.      •Treatment for other conditions.      If needed, you may be treated in the hospital for kidney function problems or to manage other complications.      Follow these instructions at home:    Medicines     •Take over-the-counter and prescription medicines only as told by your health care provider. Avoid certain medicines, such as decongestants, antihistamines, and some prescription medicines. Do not take any medicine unless your health care provider approves.      •If you were prescribed an antibiotic medicine, take it as told by your health care provider. Do not stop using the antibiotic even if you start to feel better.      General instructions     • Do not use any products that contain nicotine or tobacco. These products include cigarettes, chewing tobacco, and vaping devices, such as e-cigarettes. If you need help quitting, ask your health care provider.      •Drink enough fluid to keep your urine pale yellow.      •If you have an indwelling urinary catheter, follow the instructions from your health care provider.      •Monitor any changes in your symptoms. Tell your health care provider about any changes.      •If instructed, monitor your blood pressure at home. Report changes as told by your health care provider.      •Keep all follow-up visits. This is important.        Contact a health care provider if:    •You have uncomfortable bladder contractions that you cannot control (spasms).      •You leak urine with the spasms.        Get help right away if:    •You have chills or a fever.      •You have blood in your urine.    •You have a catheter and the following happens:  •Your catheter stops draining urine.      •Your catheter falls out.          Summary    •Acute urinary retention is a condition in which a person is unable to pass urine or can only pass a little urine. If left untreated, this condition can result in kidney damage or other serious complications.      •An enlarged prostate may cause this condition. As men age, their prostate gland may become larger and may press or squeeze on the bladder or the urethra.      •Treatment for this condition may include medicines and placement of an indwelling urinary catheter.      •Monitor any changes in your symptoms. Tell your health care provider about any changes.      This information is not intended to replace advice given to you by your health care provider. Make sure you discuss any questions you have with your health care provider.      Document Revised: 09/08/2021 Document Reviewed: 09/08/2021    Elsevier Patient Education © 2022 [employer]

## 2022-06-21 ENCOUNTER — EMERGENCY (EMERGENCY)
Facility: HOSPITAL | Age: 32
LOS: 1 days | Discharge: HOME | End: 2022-06-21
Attending: EMERGENCY MEDICINE
Payer: MEDICAID

## 2022-06-21 VITALS
SYSTOLIC BLOOD PRESSURE: 98 MMHG | HEART RATE: 97 BPM | WEIGHT: 293.21 LBS | HEIGHT: 71 IN | RESPIRATION RATE: 19 BRPM | DIASTOLIC BLOOD PRESSURE: 60 MMHG | TEMPERATURE: 100 F | OXYGEN SATURATION: 96 %

## 2022-06-21 VITALS
TEMPERATURE: 98 F | HEART RATE: 83 BPM | DIASTOLIC BLOOD PRESSURE: 98 MMHG | RESPIRATION RATE: 18 BRPM | OXYGEN SATURATION: 98 % | SYSTOLIC BLOOD PRESSURE: 137 MMHG

## 2022-06-21 PROCEDURE — 99282 EMERGENCY DEPT VISIT SF MDM: CPT

## 2022-06-21 PROCEDURE — 99284 EMERGENCY DEPT VISIT MOD MDM: CPT

## 2022-06-21 RX ORDER — TAMSULOSIN HYDROCHLORIDE 0.4 MG/1
0.4 CAPSULE ORAL ONCE
Refills: 0 | Status: DISCONTINUED | OUTPATIENT
Start: 2022-06-21 | End: 2022-06-25

## 2022-06-21 RX ORDER — TAMSULOSIN HYDROCHLORIDE 0.4 MG/1
1 CAPSULE ORAL
Qty: 7 | Refills: 0
Start: 2022-06-21 | End: 2022-07-20

## 2022-06-21 NOTE — ED PROVIDER NOTE - OBJECTIVE STATEMENT
32 year old male with PMHx of autism, factitious disorder, BPH is presenting to the ED with chief complaint of inability to urinate this morning. His aid (Sofia) states that he had medicine for BPH but it was discontinued by his urologist last week. She also states that patient holds in urine in order to get a catheter and to get admitted. No fevers, vomiting, or constipation. Allergies: Zyprexa - Dystonic     Haldol - other

## 2022-06-21 NOTE — ED PROVIDER NOTE - CLINICAL SUMMARY MEDICAL DECISION MAKING FREE TEXT BOX
32 year old male here for inability to urinate bladder. Scan showed 170cc in bladder. Will attempt to PO hydration and reassess.

## 2022-06-21 NOTE — ED PROVIDER NOTE - NSFOLLOWUPINSTRUCTIONS_ED_ALL_ED_FT
Benign Prostatic Hyperplasia       Benign prostatic hyperplasia (BPH) is an enlarged prostate gland that is caused by the normal aging process and not by cancer. The prostate is a walnut-sized gland that is involved in the production of semen. It is located in front of the rectum and below the bladder. The bladder stores urine and the urethra is the tube that carries the urine out of the body. The prostate may get bigger as a man gets older.    An enlarged prostate can press on the urethra. This can make it harder to pass urine. The build-up of urine in the bladder can cause infection. Back pressure and infection may progress to bladder damage and kidney (renal) failure.      What are the causes?    This condition is part of a normal aging process. However, not all men develop problems from this condition. If the prostate enlarges away from the urethra, urine flow will not be blocked. If it enlarges toward the urethra and compresses it, there will be problems passing urine.      What increases the risk?    This condition is more likely to develop in men over the age of 50 years.      What are the signs or symptoms?    Symptoms of this condition include:  •Getting up often during the night to urinate.      •Needing to urinate frequently during the day.      •Difficulty starting urine flow.      •Decrease in size and strength of your urine stream.      •Leaking (dribbling) after urinating.      •Inability to pass urine. This needs immediate treatment.      •Inability to completely empty your bladder.      •Pain when you pass urine. This is more common if there is also an infection.      •Urinary tract infection (UTI).        How is this diagnosed?    This condition is diagnosed based on your medical history, a physical exam, and your symptoms. Tests will also be done, such as:  •A post-void bladder scan. This measures any amount of urine that may remain in your bladder after you finish urinating.      •A digital rectal exam. In a rectal exam, your health care provider checks your prostate by putting a lubricated, gloved finger into your rectum to feel the back of your prostate gland. This exam detects the size of your gland and any abnormal lumps or growths.      •An exam of your urine (urinalysis).      •A prostate specific antigen (PSA) screening. This is a blood test used to screen for prostate cancer.      •An ultrasound. This test uses sound waves to electronically produce a picture of your prostate gland.      Your health care provider may refer you to a specialist in kidney and prostate diseases (urologist).      How is this treated?    Once symptoms begin, your health care provider will monitor your condition (active surveillance or watchful waiting). Treatment for this condition will depend on the severity of your condition. Treatment may include:  •Observation and yearly exams. This may be the only treatment needed if your condition and symptoms are mild.    •Medicines to relieve your symptoms, including:  •Medicines to shrink the prostate.      •Medicines to relax the muscle of the prostate.      •Surgery in severe cases. Surgery may include:  •Prostatectomy. In this procedure, the prostate tissue is removed completely through an open incision or with a laparoscope or robotics.      •Transurethral resection of the prostate (TURP). In this procedure, a tool is inserted through the opening at the tip of the penis (urethra). It is used to cut away tissue of the inner core of the prostate. The pieces are removed through the same opening of the penis. This removes the blockage.      •Transurethral incision (TUIP). In this procedure, small cuts are made in the prostate. This lessens the prostate's pressure on the urethra.      •Transurethral microwave thermotherapy (TUMT). This procedure uses microwaves to create heat. The heat destroys and removes a small amount of prostate tissue.      •Transurethral needle ablation (TUNA). This procedure uses radio frequencies to destroy and remove a small amount of prostate tissue.      •Interstitial laser coagulation (ILC). This procedure uses a laser to destroy and remove a small amount of prostate tissue.      •Transurethral electrovaporization (TUVP). This procedure uses electrodes to destroy and remove a small amount of prostate tissue.      •Prostatic urethral lift. This procedure inserts an implant to push the lobes of the prostate away from the urethra.          Follow these instructions at home:    •Take over-the-counter and prescription medicines only as told by your health care provider.      •Monitor your symptoms for any changes. Contact your health care provider with any changes.      •Avoid drinking large amounts of liquid before going to bed or out in public.      •Avoid or reduce how much caffeine or alcohol you drink.      •Give yourself time when you urinate.      •Keep all follow-up visits as told by your health care provider. This is important.        Contact a health care provider if:    •You have unexplained back pain.      •Your symptoms do not get better with treatment.      •You develop side effects from the medicine you are taking.      •Your urine becomes very dark or has a bad smell.      •Your lower abdomen becomes distended and you have trouble passing your urine.        Get help right away if:    •You have a fever or chills.      •You suddenly cannot urinate.      •You feel lightheaded, or very dizzy, or you faint.      •There are large amounts of blood or clots in the urine.      •Your urinary problems become hard to manage.      •You develop moderate to severe low back or flank pain. The flank is the side of your body between the ribs and the hip.      These symptoms may represent a serious problem that is an emergency. Do not wait to see if the symptoms will go away. Get medical help right away. Call your local emergency services (911 in the [place] Do not drive yourself to the hospital.       Summary    •Benign prostatic hyperplasia (BPH) is an enlarged prostate that is caused by the normal aging process and not by cancer.      •An enlarged prostate can press on the urethra. This can make it hard to pass urine.      •This condition is part of a normal aging process and is more likely to develop in men over the age of 50 years.      •Get help right away if you suddenly cannot urinate.      This information is not intended to replace advice given to you by your health care provider. Make sure you discuss any questions you have with your health care provider.

## 2022-06-21 NOTE — ED PROVIDER NOTE - PATIENT PORTAL LINK FT
You can access the FollowMyHealth Patient Portal offered by [facility] by registering at the following website: [email or web address]. By joining [employer]’s FollowMyHealth portal, you will also be able to view your health information using other applications (apps) compatible with our system.

## 2022-06-21 NOTE — ED PROVIDER NOTE - PROGRESS NOTE DETAILS
patient voided large amount spontaneously. Vani Diaz at bedside states pt holds in urine in effort to get catheter and admission. Pt now asking to go home.

## 2022-06-29 PROBLEM — F63.9 IMPULSE DISORDER, UNSPECIFIED: Chronic | Status: INACTIVE | Noted: 2018-12-21 | Resolved: 2021-08-12

## 2022-06-30 ENCOUNTER — EMERGENCY (EMERGENCY)
Facility: HOSPITAL | Age: 32
LOS: 1 days | Discharge: HOME | End: 2022-06-30
Attending: STUDENT IN AN ORGANIZED HEALTH CARE EDUCATION/TRAINING PROGRAM
Payer: MEDICAID

## 2022-06-30 VITALS
OXYGEN SATURATION: 96 % | TEMPERATURE: 99 F | DIASTOLIC BLOOD PRESSURE: 81 MMHG | HEART RATE: 95 BPM | RESPIRATION RATE: 16 BRPM | WEIGHT: 294.98 LBS | SYSTOLIC BLOOD PRESSURE: 117 MMHG | HEIGHT: 71 IN

## 2022-06-30 PROCEDURE — 99282 EMERGENCY DEPT VISIT SF MDM: CPT

## 2022-06-30 PROCEDURE — 99284 EMERGENCY DEPT VISIT MOD MDM: CPT

## 2022-06-30 NOTE — ED PROVIDER NOTE - PATIENT PORTAL LINK FT
You can access the FollowMyHealth Patient Portal offered by [facility] by registering at the following website: [email or web address]. By joining [employer]’s FollowMyHealth portal, you will also be able to view your health information using other applications (apps) compatible with our system.

## 2022-06-30 NOTE — ED PROVIDER NOTE - CLINICAL SUMMARY MEDICAL DECISION MAKING FREE TEXT BOX
Complaint of urinary retention, although patient has prior history of faking urinary retention to get catheter and admission. Beside ultrasound shows bladder volume of 90ccs. Abdomen benign. Will hydrate orally and reassess.

## 2022-06-30 NOTE — ED PROVIDER NOTE - OBJECTIVE STATEMENT
32 year old male with autism, factitious disorder, and BPH presents to ED complaining of inability to urinate for the past 3 hours. Patient has been here multiple times for the same complaint. Per prior charts and aide at bedside, patient sometimes states he cannot urinate in order to get a catheter and be admitted. He denies any other symptoms. Patient was just discharged yesterday from an outside hospital for cutting. He reportedly had a Sarabia during that admission, which was removed yesterday. NKDA.

## 2022-06-30 NOTE — ED PROVIDER NOTE - NSFOLLOWUPINSTRUCTIONS_ED_ALL_ED_FT
Follow up with Urologist and PCP.   Return to the ER if you develop any new or worsening symptoms such as chest pain, shortness of breath, numbness, weakness, abdominal pain, nausea, vomiting, or visual changes.

## 2022-06-30 NOTE — ED ADULT TRIAGE NOTE - CHIEF COMPLAINT QUOTE
BIBA pt c/o urinary retention since having urinary catheter removed yesterday, pt appears comfortable, not in distress

## 2022-06-30 NOTE — ED PROVIDER NOTE - NSICDXPASTMEDICALHX_GEN_ALL_CORE_FT
PAST MEDICAL HISTORY:  Asthma     Asthma     Autism     Dyslipidemia     Enuresis     Factitious disorder     GERD (gastroesophageal reflux disease)     GERD (gastroesophageal reflux disease)     History of BPH     HLD (hyperlipidemia)     Hypothyroid     Hypothyroidism     Intellectual disability     Mood disorder     Myopia of both eyes     Obesity     Urinary retention

## 2022-07-01 PROBLEM — E66.9 OBESITY, UNSPECIFIED: Chronic | Status: ACTIVE | Noted: 2019-01-28

## 2022-07-03 ENCOUNTER — EMERGENCY (EMERGENCY)
Facility: HOSPITAL | Age: 32
LOS: 1 days | Discharge: HOME | End: 2022-07-03
Attending: STUDENT IN AN ORGANIZED HEALTH CARE EDUCATION/TRAINING PROGRAM
Payer: MEDICAID

## 2022-07-03 VITALS
TEMPERATURE: 96 F | HEIGHT: 71 IN | HEART RATE: 95 BPM | DIASTOLIC BLOOD PRESSURE: 87 MMHG | SYSTOLIC BLOOD PRESSURE: 124 MMHG | WEIGHT: 266.76 LBS | RESPIRATION RATE: 16 BRPM | OXYGEN SATURATION: 96 %

## 2022-07-03 PROCEDURE — 99283 EMERGENCY DEPT VISIT LOW MDM: CPT

## 2022-07-03 RX ORDER — IBUPROFEN 200 MG
600 TABLET ORAL ONCE
Refills: 0 | Status: COMPLETED | OUTPATIENT
Start: 2022-07-03 | End: 2022-07-03

## 2022-07-03 RX ADMIN — Medication 600 MILLIGRAM(S): at 20:57

## 2022-07-03 NOTE — ED ADULT NURSE NOTE - CHIEF COMPLAINT
Pt arrives alert and oriented x4 from home for complaint of fall. Per EMS pt's home health nurse came to check on him today and noticed he has bruising on his face. Pt stated he fell late last night denies LOC.   The patient is a 32y Male complaining of wound check.

## 2022-07-03 NOTE — ED PROVIDER NOTE - OBJECTIVE STATEMENT
32 y.o presenting with wound to left arm, endorses that he is concern for infection. denies n, v, fever, cp, sob, weakness. endorses wound occurred yesterday.

## 2022-07-03 NOTE — ED PROVIDER NOTE - DISPOSITION TYPE
\"No precert required\" per automated system at [facility] for procedure (CPT 3118O) on 11/26/2018, reference #[id number].
DISCHARGE

## 2022-07-03 NOTE — ED PROVIDER NOTE - PATIENT PORTAL LINK FT
You can access the FollowMyHealth Patient Portal offered by [facility] by registering at the following website: [email or web address]. By joining [employer]’s FollowMyHealth portal, you will also be able to view your health information using other applications (apps) compatible with our system.

## 2022-07-03 NOTE — ED PROVIDER NOTE - CLINICAL SUMMARY MEDICAL DECISION MAKING FREE TEXT BOX
Patient presenting for left arm wound, no signs of infection, no laceration for repair. well appearing. will give return precaution and instruct to f.u pmd.
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
## 2022-07-04 ENCOUNTER — EMERGENCY (EMERGENCY)
Facility: HOSPITAL | Age: 32
LOS: 1 days | Discharge: HOME | End: 2022-07-04
Attending: EMERGENCY MEDICINE
Payer: MEDICAID

## 2022-07-04 VITALS
WEIGHT: 296.08 LBS | OXYGEN SATURATION: 97 % | HEART RATE: 80 BPM | TEMPERATURE: 98 F | RESPIRATION RATE: 15 BRPM | SYSTOLIC BLOOD PRESSURE: 121 MMHG | HEIGHT: 71 IN | DIASTOLIC BLOOD PRESSURE: 52 MMHG

## 2022-07-04 PROCEDURE — 73030 X-RAY EXAM OF SHOULDER: CPT

## 2022-07-04 PROCEDURE — 99283 EMERGENCY DEPT VISIT LOW MDM: CPT | Mod: 25

## 2022-07-04 PROCEDURE — 73030 X-RAY EXAM OF SHOULDER: CPT | Mod: 26,LT

## 2022-07-04 PROCEDURE — 99284 EMERGENCY DEPT VISIT MOD MDM: CPT

## 2022-07-04 NOTE — ED PROVIDER NOTE - PATIENT PORTAL LINK FT
You can access the FollowMyHealth Patient Portal offered by [facility] by registering at the following website: [email or web address]. By joining [employer]’s FollowMyHealth portal, you will also be able to view your health information using other applications (apps) compatible with our system.

## 2022-07-04 NOTE — ED PROVIDER NOTE - CLINICAL SUMMARY MEDICAL DECISION MAKING FREE TEXT BOX
Exam low suspicion for fx/dislocation and Xray negative. Full ROM and using arm spontaneously. Neurovascularly intact extrem. Patient is well appearing, NAD, afebrile, hemodynamically stable. Any available tests and studies were discussed with patient. Discharged with instructions in further symptomatic care, return precautions, and need for f/u.
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
## 2022-07-04 NOTE — ED PROVIDER NOTE - NSFOLLOWUPINSTRUCTIONS_ED_ALL_ED_FT
Please follow up with an orthopedist doctor in 1-2 days.  Please use ibuprofen or acetaminophen as needed for pain.  Please return to the emergency department if you have worsening pain, weakness or numbness, change in color to your arm, dizziness, fever, or any other symptoms.      Shoulder Pain    WHAT YOU NEED TO KNOW:    Shoulder pain is a common problem that can affect your daily activities. Pain can be caused by a problem within your shoulder, such as soreness of a tendon or bursa. A tendon is a cord of tough tissue that connects your muscles to your bones. The bursa is a fluid-filled sac that acts as a cushion between a bone and a tendon. Shoulder pain may also be caused by pain that spreads to your shoulder from another part of your body.    DISCHARGE INSTRUCTIONS:    Return to the emergency department if:   •You have severe pain.  •You cannot move your arm or shoulder.  •You have numbness or tingling in your shoulder or arm.    Contact your healthcare provider if:   •Your pain gets worse or does not go away with treatment.  •You have trouble moving your arm or shoulder.  •You have questions or concerns about your condition or care.    Medicines: You may need any of the following:   •Acetaminophen decreases pain and fever. It is available without a doctor's order. Ask how much to take and how often to take it. Follow directions. Read the labels of all other medicines you are using to see if they also contain acetaminophen, or ask your doctor or pharmacist. Acetaminophen can cause liver damage if not taken correctly. Do not use more than 4 grams (4,000 milligrams) total of acetaminophen in one day.   •NSAIDs, such as ibuprofen, help decrease swelling, pain, and fever. This medicine is available with or without a doctor's order. NSAIDs can cause stomach bleeding or kidney problems in certain people. If you take blood thinner medicine, always ask your healthcare provider if NSAIDs are safe for you. Always read the medicine label and follow directions.  •Take your medicine as directed. Contact your healthcare provider if you think your medicine is not helping or if you have side effects. Tell him of her if you are allergic to any medicine. Keep a list of the medicines, vitamins, and herbs you take. Include the amounts, and when and why you take them. Bring the list or the pill bottles to follow-up visits. Carry your medicine list with you in case of an emergency.    Manage your symptoms:   •Apply ice on your shoulder for 20 to 30 minutes every 2 hours or as directed. Use an ice pack, or put crushed ice in a plastic bag. Cover it with a towel before you apply it to your shoulder. Ice helps prevent tissue damage and decreases swelling and pain.    •Apply heat if ice does not help your symptoms. Apply heat on your shoulder for 20 to 30 minutes every 2 hours for as many days as directed. Heat helps decrease pain and muscle spasms.  •Limit activities as directed. Try to avoid repeated overhead movements.  •Go to physical or occupational therapy as directed. A physical therapist teaches you exercises to help improve movement and strength, and to decrease pain. An occupational therapist teaches you skills to help with your daily activities.     Prevent shoulder pain:   •Maintain a good range of motion in your shoulder. Ask your healthcare provider which exercises you should do on a regular basis after you have healed.   •Stretch and strengthen your shoulder. Use proper technique during exercises and sports.    Follow up with your healthcare provider or orthopedist as directed: Write down your questions so you remember to ask them during your visits.

## 2022-07-04 NOTE — ED PROVIDER NOTE - PHYSICAL EXAMINATION
Afebrile, hemodynamically stable, saturating well  NAD, well appearing, laying comfortably in bed, no WOB, speaking full sentences  Head NCAT  EOMI grossly  MMM  Breathing comfortably on RA  AAO, CN's 3-12 grossly intact  SALAS spontaneously, full shoulder ROM, yellowish discoloration to some spots at anterior shoulder without hematoma, no stepoff/deformity, nml warmth/color/sensation, 2+ radial pulse, <2 sec cap refill, full fingers to thumb/finger adduction/thumb extension  Skin warm, well perfused
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
## 2022-07-05 NOTE — ED ADULT NURSE NOTE - NSIMPLEMENTINTERV_GEN_ALL_ED
Implemented All Universal Safety Interventions:  [place] to call system. Call bell, personal items and telephone within reach. Instruct patient to call for assistance. Room bathroom lighting operational. Non-slip footwear when patient is off stretcher. Physically safe environment: no spills, clutter or unnecessary equipment. Stretcher in lowest position, wheels locked, appropriate side rails in place.

## 2022-07-05 NOTE — ED PROVIDER NOTE - NS_ATTENDINGSCRIBE_ED_ALL_ED
Patient was discharged from [facility] on 07/03/2022. Patient will need a discharge follow up call. I personally performed the service described in the documentation recorded by the scribe in my presence, and it accurately and completely records my words and actions.

## 2022-07-08 ENCOUNTER — EMERGENCY (EMERGENCY)
Facility: HOSPITAL | Age: 32
LOS: 1 days | Discharge: HOME | End: 2022-07-08
Attending: EMERGENCY MEDICINE
Payer: MEDICAID

## 2022-07-08 VITALS
SYSTOLIC BLOOD PRESSURE: 121 MMHG | OXYGEN SATURATION: 95 % | RESPIRATION RATE: 18 BRPM | HEIGHT: 71 IN | TEMPERATURE: 99 F | WEIGHT: 294.1 LBS | DIASTOLIC BLOOD PRESSURE: 80 MMHG | HEART RATE: 110 BPM

## 2022-07-08 VITALS
SYSTOLIC BLOOD PRESSURE: 133 MMHG | OXYGEN SATURATION: 95 % | DIASTOLIC BLOOD PRESSURE: 89 MMHG | RESPIRATION RATE: 20 BRPM | HEART RATE: 92 BPM | TEMPERATURE: 98 F

## 2022-07-08 LAB
ALBUMIN SERPL ELPH-MCNC: 3.2 G/DL — LOW (ref 3.5–5)
ALP SERPL-CCNC: 95 U/L — SIGNIFICANT CHANGE UP (ref 40–120)
ALT FLD-CCNC: 100 U/L DA — HIGH (ref 10–60)
ANION GAP SERPL CALC-SCNC: 9 MMOL/L — SIGNIFICANT CHANGE UP (ref 5–17)
AST SERPL-CCNC: 32 U/L — SIGNIFICANT CHANGE UP (ref 10–40)
BASOPHILS # BLD AUTO: 0.03 K/UL — SIGNIFICANT CHANGE UP (ref 0–0.2)
BASOPHILS NFR BLD AUTO: 0.5 % — SIGNIFICANT CHANGE UP (ref 0–2)
BILIRUB SERPL-MCNC: 0.1 MG/DL — LOW (ref 0.2–1.2)
BUN SERPL-MCNC: 20 MG/DL — HIGH (ref 7–18)
CALCIUM SERPL-MCNC: 9 MG/DL — SIGNIFICANT CHANGE UP (ref 8.4–10.5)
CHLORIDE SERPL-SCNC: 107 MMOL/L — SIGNIFICANT CHANGE UP (ref 96–108)
CO2 SERPL-SCNC: 26 MMOL/L — SIGNIFICANT CHANGE UP (ref 22–31)
CREAT SERPL-MCNC: 1.01 MG/DL — SIGNIFICANT CHANGE UP (ref 0.5–1.3)
EGFR: 101 ML/MIN/1.73M2 — SIGNIFICANT CHANGE UP
EOSINOPHIL # BLD AUTO: 0.22 K/UL — SIGNIFICANT CHANGE UP (ref 0–0.5)
EOSINOPHIL NFR BLD AUTO: 3.5 % — SIGNIFICANT CHANGE UP (ref 0–6)
GLUCOSE SERPL-MCNC: 152 MG/DL — HIGH (ref 70–99)
HCT VFR BLD CALC: 43.3 % — SIGNIFICANT CHANGE UP (ref 39–50)
HGB BLD-MCNC: 13.5 G/DL — SIGNIFICANT CHANGE UP (ref 13–17)
IMM GRANULOCYTES NFR BLD AUTO: 0.8 % — SIGNIFICANT CHANGE UP (ref 0–1.5)
LYMPHOCYTES # BLD AUTO: 2.19 K/UL — SIGNIFICANT CHANGE UP (ref 1–3.3)
LYMPHOCYTES # BLD AUTO: 34.6 % — SIGNIFICANT CHANGE UP (ref 13–44)
MCHC RBC-ENTMCNC: 24.6 PG — LOW (ref 27–34)
MCHC RBC-ENTMCNC: 31.2 GM/DL — LOW (ref 32–36)
MCV RBC AUTO: 79 FL — LOW (ref 80–100)
MONOCYTES # BLD AUTO: 0.54 K/UL — SIGNIFICANT CHANGE UP (ref 0–0.9)
MONOCYTES NFR BLD AUTO: 8.5 % — SIGNIFICANT CHANGE UP (ref 2–14)
NEUTROPHILS # BLD AUTO: 3.3 K/UL — SIGNIFICANT CHANGE UP (ref 1.8–7.4)
NEUTROPHILS NFR BLD AUTO: 52.1 % — SIGNIFICANT CHANGE UP (ref 43–77)
NRBC # BLD: 0 /100 WBCS — SIGNIFICANT CHANGE UP (ref 0–0)
PLATELET # BLD AUTO: 227 K/UL — SIGNIFICANT CHANGE UP (ref 150–400)
POTASSIUM SERPL-MCNC: 4.1 MMOL/L — SIGNIFICANT CHANGE UP (ref 3.5–5.3)
POTASSIUM SERPL-SCNC: 4.1 MMOL/L — SIGNIFICANT CHANGE UP (ref 3.5–5.3)
PROT SERPL-MCNC: 6.5 G/DL — SIGNIFICANT CHANGE UP (ref 6–8.3)
RBC # BLD: 5.48 M/UL — SIGNIFICANT CHANGE UP (ref 4.2–5.8)
RBC # FLD: 15.4 % — HIGH (ref 10.3–14.5)
SODIUM SERPL-SCNC: 142 MMOL/L — SIGNIFICANT CHANGE UP (ref 135–145)
TROPONIN I, HIGH SENSITIVITY RESULT: 6.7 NG/L — SIGNIFICANT CHANGE UP
WBC # BLD: 6.33 K/UL — SIGNIFICANT CHANGE UP (ref 3.8–10.5)
WBC # FLD AUTO: 6.33 K/UL — SIGNIFICANT CHANGE UP (ref 3.8–10.5)

## 2022-07-08 PROCEDURE — 84484 ASSAY OF TROPONIN QUANT: CPT

## 2022-07-08 PROCEDURE — 36415 COLL VENOUS BLD VENIPUNCTURE: CPT

## 2022-07-08 PROCEDURE — 80053 COMPREHEN METABOLIC PANEL: CPT

## 2022-07-08 PROCEDURE — 96374 THER/PROPH/DIAG INJ IV PUSH: CPT

## 2022-07-08 PROCEDURE — 93005 ELECTROCARDIOGRAM TRACING: CPT

## 2022-07-08 PROCEDURE — 71045 X-RAY EXAM CHEST 1 VIEW: CPT | Mod: 26

## 2022-07-08 PROCEDURE — 93010 ELECTROCARDIOGRAM REPORT: CPT

## 2022-07-08 PROCEDURE — 99284 EMERGENCY DEPT VISIT MOD MDM: CPT | Mod: 25

## 2022-07-08 PROCEDURE — 99285 EMERGENCY DEPT VISIT HI MDM: CPT

## 2022-07-08 PROCEDURE — 71045 X-RAY EXAM CHEST 1 VIEW: CPT

## 2022-07-08 PROCEDURE — 85025 COMPLETE CBC W/AUTO DIFF WBC: CPT

## 2022-07-08 RX ORDER — KETOROLAC TROMETHAMINE 30 MG/ML
30 SYRINGE (ML) INJECTION ONCE
Refills: 0 | Status: DISCONTINUED | OUTPATIENT
Start: 2022-07-08 | End: 2022-07-08

## 2022-07-08 RX ORDER — SODIUM CHLORIDE 9 MG/ML
1000 INJECTION INTRAMUSCULAR; INTRAVENOUS; SUBCUTANEOUS ONCE
Refills: 0 | Status: COMPLETED | OUTPATIENT
Start: 2022-07-08 | End: 2022-07-08

## 2022-07-08 RX ADMIN — SODIUM CHLORIDE 1000 MILLILITER(S): 9 INJECTION INTRAMUSCULAR; INTRAVENOUS; SUBCUTANEOUS at 03:46

## 2022-07-08 RX ADMIN — Medication 30 MILLIGRAM(S): at 03:46

## 2022-07-08 NOTE — ED PROVIDER NOTE - PATIENT PORTAL LINK FT
You can access the FollowMyHealth Patient Portal offered by [facility] by registering at the following website: [email or web address]. By joining [employer]’s FollowMyHealth portal, you will also be able to view your health information using other applications (apps) compatible with our system.

## 2022-07-08 NOTE — ED ADULT TRIAGE NOTE - CHIEF COMPLAINT QUOTE
patient reports left upper arm pain, swollen x 2 months ,left sided chest pain, headache ,blurry vision, productive cough with phlegm x today. patients stated " I was upset yesterday & today & I cut  my left forearm with knife. no active bleeding in triage. denies suicidal thoughts

## 2022-07-08 NOTE — ED PROVIDER NOTE - CARDIAC, MLM
Normal rate, regular rhythm.  Heart sounds S1, S2.  No murmurs, rubs or gallops. ttp left chest wall- no skin changes, no swelling.

## 2022-07-08 NOTE — ED PROVIDER NOTE - PROGRESS NOTE DETAILS
ecg sinus tachy, otherwise unremarkable. labs are unremarkable. cxr clear. likely msk. feels improved with toradol. will dc. /fu with PMD. return precautions discussed.

## 2022-07-08 NOTE — ED ADULT TRIAGE NOTE - CCCP TRG CHIEF CMPLNT
left sided,blurry vision, productive cough with phlegm,/chest pain left sided ,blurry vision, productive cough with phlegm,/chest pain

## 2022-07-08 NOTE — ED PROVIDER NOTE - NSFOLLOWUPINSTRUCTIONS_ED_ALL_ED_FT
[facility]                                                                                                                        Nonspecific Chest Pain, Adult      Chest pain is an uncomfortable, tight, or painful feeling in the chest. The pain can feel like a crushing, aching, or squeezing pressure. A person can feel a burning or tingling sensation. Chest pain can also be felt in your back, neck, jaw, shoulder, or arm. This pain can be worse when you move, sneeze, or take a deep breath.    Chest pain can be caused by a condition that is life-threatening. This must be treated right away. It can also be caused by something that is not life-threatening. If you have chest pain, it can be hard to know the difference, so it is important to get help right away to make sure that you do not have a serious condition.    Some life-threatening causes of chest pain include:  •Heart attack.      •A tear in the body's main blood vessel (aortic dissection).      •Inflammation around your heart (pericarditis).      •A problem in the lungs, such as a blood clot (pulmonary embolism) or a collapsed lung (pneumothorax).      Some non life-threatening causes of chest pain include:  •Heartburn.      •Anxiety or stress.      •Damage to the bones, muscles, and cartilage that make up your chest wall.      •Pneumonia or bronchitis.      •Shingles infection (varicella-zoster virus).      Your chest pain may come and go. It may also be constant. Your health care provider will do tests and other studies to find the cause of your pain. Treatment will depend on the cause of your chest pain.      Follow these instructions at home:    Medicines     •Take over-the-counter and prescription medicines only as told by your health care provider.      •If you were prescribed an antibiotic medicine, take it as told by your health care provider. Do not stop taking the antibiotic even if you start to feel better.      Activity     •Avoid any activities that cause chest pain.      • Do not lift anything that is heavier than 10 lb (4.5 kg), or the limit that you are told, until your health care provider says that it is safe.      •Rest as directed by your health care provider.       •Return to your normal activities only as told by your health care provider. Ask your health care provider what activities are safe for you.        Lifestyle       A plate along with examples of foods in a healthy diet.       Silhouette of a person sitting on the floor doing yoga.     • Do not use any products that contain nicotine or tobacco, such as cigarettes, e-cigarettes, and chewing tobacco. If you need help quitting, ask your health care provider.      • Do not drink alcohol.    •Make healthy lifestyle changes as recommended. These may include:  •Getting regular exercise. Ask your health care provider to suggest some exercises that are safe for you.      •Eating a heart-healthy diet. This includes plenty of fresh fruits and vegetables, whole grains, low-fat (lean) protein, and low-fat dairy products. A dietitian can help you find healthy eating options.      •Maintaining a healthy weight.      •Managing any other health conditions you may have, such as high blood pressure (hypertension) or diabetes.      •Reducing stress, such as with yoga or relaxation techniques.        General instructions     •Pay attention to any changes in your symptoms.      •It is up to you to get the results of any tests that were done. Ask your health care provider, or the department that is doing the tests, when your results will be ready.      •Keep all follow-up visits as told by your health care provider. This is important.       •You may be asked to go for further testing if your chest pain does not go away.        Contact a health care provider if:    •Your chest pain does not go away.      •You feel depressed.      •You have a fever.      •You notice changes in your symptoms or develop new symptoms.        Get help right away if:    •Your chest pain gets worse.      •You have a cough that gets worse, or you cough up blood.      •You have severe pain in your abdomen.      •You faint.      •You have sudden, unexplained chest discomfort.      •You have sudden, unexplained discomfort in your arms, back, neck, or jaw.      •You have shortness of breath at any time.      •You suddenly start to sweat, or your skin gets clammy.      •You feel nausea or you vomit.      •You suddenly feel lightheaded or dizzy.      •You have severe weakness, or unexplained weakness or fatigue.      •Your heart begins to beat quickly, or it feels like it is skipping beats.      These symptoms may represent a serious problem that is an emergency. Do not wait to see if the symptoms will go away. Get medical help right away. Call your local emergency services (911 in the [place] Do not drive yourself to the hospital.       Summary    •Chest pain can be caused by a condition that is serious and requires urgent treatment. It may also be caused by something that is not life-threatening.      •Your health care provider may do lab tests and other studies to find the cause of your pain.      •Follow your health care provider's instructions on taking medicines, making lifestyle changes, and getting emergency treatment if symptoms become worse.      •Keep all follow-up visits as told by your health care provider. This includes visits for any further testing if your chest pain does not go away.      This information is not intended to replace advice given to you by your health care provider. Make sure you discuss any questions you have with your health care provider.      Document Revised: 03/03/2022 Document Reviewed: 03/03/2022    Elsevier Patient Education © 2022 Elsevier Inc.

## 2022-07-08 NOTE — ED ADULT NURSE NOTE - OBJECTIVE STATEMENT
Lab Results   Component Value Date    TSH 0.081 (L) 05/24/2018       I tried calling pt no answer and not on pt portal   Pt notify her that she is taking too much levothyroxine  I recommend decreasing dose from 100mcg down to 88mcg 1 tab daily and will repeat labs in 3 months.   rx sent to [facility] in [place].    patient reports left upper arm pain, swollen x 2 months ,left sided chest pain, headache ,blurry vision, productive cough with phlegm x today. patients stated " I was upset yesterday & today & I cut  my left forearm with knife. no active bleeding in triage. denies suicidal thoughts

## 2022-07-08 NOTE — ED PROVIDER NOTE - OBJECTIVE STATEMENT
32 year old male presents with 24 hours of left sided chest pain and mild SOB. pt states hasn't taken any medication for his symptoms. denies fevers, chills, sweats, abd pains, N/V/D/C, LE swelling.

## 2022-07-08 NOTE — ED ADULT NURSE NOTE - NSIMPLEMENTINTERV_GEN_ALL_ED
Implemented All Universal Safety Interventions:  [place] to call system. Call bell, personal items and telephone within reach. Instruct patient to call for assistance. Room bathroom lighting operational. Non-slip footwear when patient is off stretcher. Physically safe environment: no spills, clutter or unnecessary equipment. Stretcher in lowest position, wheels locked, appropriate side rails in place.

## 2022-07-19 ENCOUNTER — EMERGENCY (EMERGENCY)
Facility: HOSPITAL | Age: 32
LOS: 1 days | Discharge: HOME | End: 2022-07-19
Attending: STUDENT IN AN ORGANIZED HEALTH CARE EDUCATION/TRAINING PROGRAM
Payer: MEDICAID

## 2022-07-19 VITALS
WEIGHT: 289.91 LBS | SYSTOLIC BLOOD PRESSURE: 134 MMHG | HEIGHT: 71 IN | OXYGEN SATURATION: 96 % | RESPIRATION RATE: 16 BRPM | TEMPERATURE: 99 F | HEART RATE: 101 BPM | DIASTOLIC BLOOD PRESSURE: 83 MMHG

## 2022-07-19 PROCEDURE — 99282 EMERGENCY DEPT VISIT SF MDM: CPT

## 2022-07-19 PROCEDURE — 99284 EMERGENCY DEPT VISIT MOD MDM: CPT

## 2022-07-19 NOTE — ED PROVIDER NOTE - CLINICAL SUMMARY MEDICAL DECISION MAKING FREE TEXT BOX
32-year-old male presenting for complaint of urinary retention.  Patient was well-known to this ED after presenting with similar complaints and requesting a Sarabia catheter.  Patient has been admitted, given catheter, and seen by urology multiple times..  When asked why he does not follow-up with urology as an outpatient the patient stated "I come here so you can help me".  Patient smiling and laughing softly on exam and during history.  Unlikely any acute pathology. When informed to be discharged patient became agitated stating he was called 911 and go to a different hospital to be reevaluated.

## 2022-07-19 NOTE — ED PROVIDER NOTE - NSFOLLOWUPINSTRUCTIONS_ED_ALL_ED_FT
Please follow-up with your primary care doctor in the next 24 to 48 hours.    Please follow-up with a neurologist in the next 1 to 2 weeks.    If you have any worsening symptoms, severe headache, chest pain, shortness of breath, or abdominal pain please return to the emergency department.

## 2022-07-19 NOTE — ED PROVIDER NOTE - PATIENT PORTAL LINK FT
You can access the FollowMyHealth Patient Portal offered by [facility] by registering at the following website: [email or web address]. By joining [employer]’s FollowMyHealth portal, you will also be able to view your health information using other applications (apps) compatible with our system.

## 2022-07-19 NOTE — ED PROVIDER NOTE - OBJECTIVE STATEMENT
32m, pmh of Asthma, Autism, hld,  Factitious disorder , BPH Hypothyroidism, Intellectual disability Obesity Urinary retention.   Patient reports she was seen at another hospital today and discharged.  Was given a Sarabia catheter but no longer has it.  Reports since then he has not been able to urinate.  Patient has not seen a urologist.  No other current complaints.

## 2022-07-19 NOTE — ED ADULT TRIAGE NOTE - CHIEF COMPLAINT QUOTE
brian as per ems he was just d/c from [facility]. from [facility] urinating  at 3 pm and since then he have the same problem

## 2022-07-19 NOTE — ED ADULT NURSE NOTE - CHIEF COMPLAINT QUOTE
brian as per ems he was just d/c from [facility]. from [facility] urinating  at 3 pm and since then he have the same problem

## 2022-07-20 ENCOUNTER — EMERGENCY (EMERGENCY)
Facility: HOSPITAL | Age: 32
LOS: 1 days | Discharge: HOME | End: 2022-07-20
Attending: STUDENT IN AN ORGANIZED HEALTH CARE EDUCATION/TRAINING PROGRAM
Payer: MEDICAID

## 2022-07-20 VITALS
SYSTOLIC BLOOD PRESSURE: 120 MMHG | OXYGEN SATURATION: 95 % | HEART RATE: 92 BPM | DIASTOLIC BLOOD PRESSURE: 87 MMHG | TEMPERATURE: 99 F | HEIGHT: 71 IN | RESPIRATION RATE: 16 BRPM

## 2022-07-20 PROCEDURE — 99283 EMERGENCY DEPT VISIT LOW MDM: CPT

## 2022-07-20 NOTE — ED ADULT TRIAGE NOTE - RESPIRATORY RATE (BREATHS/MIN)
Jared Covington  REASON FOR CALL Colon Screening  SENT IN [facility]   No past medical history on file.  No Known Allergies  No past surgical history on file.  Social History     Socioeconomic History   • Marital status:    Tobacco Use   • Smoking status: Never Smoker   • Smokeless tobacco: Never Used   Vaping Use   • Vaping Use: Never used   Substance and Sexual Activity   • Alcohol use: Not Currently   • Sexual activity: Defer     Family History   Problem Relation Age of Onset   • Colon cancer Neg Hx      No current outpatient medications on file.     15

## 2022-07-21 ENCOUNTER — EMERGENCY (EMERGENCY)
Facility: HOSPITAL | Age: 32
LOS: 1 days | Discharge: HOME | End: 2022-07-21
Attending: EMERGENCY MEDICINE
Payer: MEDICAID

## 2022-07-21 VITALS
SYSTOLIC BLOOD PRESSURE: 130 MMHG | DIASTOLIC BLOOD PRESSURE: 85 MMHG | OXYGEN SATURATION: 99 % | HEIGHT: 71 IN | WEIGHT: 294.1 LBS | HEART RATE: 100 BPM | TEMPERATURE: 98 F | RESPIRATION RATE: 18 BRPM

## 2022-07-21 PROCEDURE — 90471 IMMUNIZATION ADMIN: CPT

## 2022-07-21 PROCEDURE — 90715 TDAP VACCINE 7 YRS/> IM: CPT

## 2022-07-21 PROCEDURE — 99283 EMERGENCY DEPT VISIT LOW MDM: CPT | Mod: 25

## 2022-07-21 PROCEDURE — 99283 EMERGENCY DEPT VISIT LOW MDM: CPT

## 2022-07-21 RX ORDER — TETANUS TOXOID, REDUCED DIPHTHERIA TOXOID AND ACELLULAR PERTUSSIS VACCINE, ADSORBED 5; 2.5; 8; 8; 2.5 [IU]/.5ML; [IU]/.5ML; UG/.5ML; UG/.5ML; UG/.5ML
0.5 SUSPENSION INTRAMUSCULAR ONCE
Refills: 0 | Status: COMPLETED | OUTPATIENT
Start: 2022-07-21 | End: 2022-07-21

## 2022-07-21 RX ADMIN — TETANUS TOXOID, REDUCED DIPHTHERIA TOXOID AND ACELLULAR PERTUSSIS VACCINE, ADSORBED 0.5 MILLILITER(S): 5; 2.5; 8; 8; 2.5 SUSPENSION INTRAMUSCULAR at 01:28

## 2022-07-21 NOTE — ED PROVIDER NOTE - CLINICAL SUMMARY MEDICAL DECISION MAKING FREE TEXT BOX
32 yr old male with aide here for possible left forearm wound. pt fell onto an AC unit and caused injury? pt received tetanus and placed bacitracin in the er visit earlier today. +pruritis     skin ulcer from blunt injury- story not consistent- looks too well placed and circumferential. Please use bacitracin 2x/day, keep area dry and clean. look for pus, redness and severe swelling. do not scratch area. will form a scab in 1 week - placed kirstin and bacitracin

## 2022-07-21 NOTE — ED PROVIDER NOTE - CLINICAL SUMMARY MEDICAL DECISION MAKING FREE TEXT BOX
Pt p/w abrasion to the L forearm after falling against an AC unit. No other signs of trauma and superficial abrasion w/o lacs. Applied bacitracin and updated tetanus. Discussed with pt my clinical impression and results, patient given strict return precautions if persistent or worsening of symptoms occurs, and need for close follow up. Pt expressed understanding and agrees with plan. Pt is well appearing with a reassuring exam. Discharge home with PMD or Specialist f/u within 5 days.

## 2022-07-21 NOTE — ED ADULT NURSE NOTE - OBJECTIVE STATEMENT
Pt came to er with c/c of laceration/cut to left forearm, s/p accident "ran into his AC" per pt verbalization

## 2022-07-21 NOTE — ED ADULT NURSE NOTE - NSPATIENTFLAG_GEN_A_ER
Purple DH (Discharge Huddle; Vulnerable Patient) Quality 226: Preventive Care And Screening: Tobacco Use: Screening And Cessation Intervention: Patient screened for tobacco use and is an ex/non-smoker Quality 265: Biopsy Follow-Up: Biopsy results reviewed, communicated, tracked, and documented Detail Level: Detailed Quality 130: Documentation Of Current Medications In The Medical Record: Current Medications Documented Quality 431: Preventive Care And Screening: Unhealthy Alcohol Use - Screening: Patient screened for unhealthy alcohol use using a single question and scores less than 2 times per year

## 2022-07-21 NOTE — ED PROVIDER NOTE - PATIENT PORTAL LINK FT
You can access the FollowMyHealth Patient Portal offered by [facility] by registering at the following website: [email or web address]. By joining [employer]’s FollowMyHealth portal, you will also be able to view your health information using other applications (apps) compatible with our system.

## 2022-07-21 NOTE — ED PROVIDER NOTE - SKIN, MLM
left arm- opposite of volar side mid arm- jean claude circumferential skin ulcer with granulation tissue, surrounding erythema. no swelling.

## 2022-07-21 NOTE — ED PROVIDER NOTE - OBJECTIVE STATEMENT
33 y/o male with history of autism, p/w abrasion to left forearm. Patient states that he collided with an air conditioner unit a couple of hours ago and denies any other trauma or hitting head. Patient uncertain of last tetanus shot. Patient denies any other pain/injuries, chest pain, shortness of breath, syncope, or any other recent illnesses and hospitalizations.

## 2022-07-21 NOTE — ED PROVIDER NOTE - NSFOLLOWUPINSTRUCTIONS_ED_ALL_ED_FT
You were seen in the emergency room today. Please call your primary doctor to inform them of this ER visit and obtain the next available appointment within the next 5 days. As we discussed, return to the ER if you have any worsening symptoms.    We no longer feel that you need further emergency care or admission to the hospital at this time.    While we have determined that you are currently stable for discharge, we know that things can change. Please seek immediate medical attention or return to the ER if you experience any of the following:  Any worsening or persistent symptoms  Severe Pain  Chest Pain  Difficulty Breathing  Bleeding  Passing Out  Severe Rash  Inability to Eat or Drink  Persistent Fever    Please see a primary care doctor or specialist within 5 days to ensure that you are improving.    Please call the [facility] phone numbers on this document if you have any problems obtaining a follow up appointment.    I wish you well! -Dr Georges

## 2022-07-21 NOTE — ED PROVIDER NOTE - PATIENT PORTAL LINK FT
92
You can access the FollowMyHealth Patient Portal offered by [facility] by registering at the following website: [email or web address]. By joining [employer]’s FollowMyHealth portal, you will also be able to view your health information using other applications (apps) compatible with our system.

## 2022-07-21 NOTE — ED PROVIDER NOTE - PHYSICAL EXAMINATION
Vital Signs Reviewed  GEN: Comfortable, NAD, AAOx3  HEENT: NCAT, MMM, Neck Supple  RESP: CTAB, No rales/rhonchi/wheezing  CV: RRR, S1S2, No murmurs  ABD: No TTP, Soft, ND, No masses, No CVA Tenderness  Extrem/Skin: Approximately 2cm circular abrasion on posterior left forearm, no bony TTP, no other signs of trauma. Equal pulses bilat, No cyanosis/edema/rashes  Neuro: No focal deficits

## 2022-07-21 NOTE — ED PROVIDER NOTE - OBJECTIVE STATEMENT
32 yr old male with aide here for possible left forearm wound. pt fell onto an AC unit and caused injury? pt received tetanus and placed bacitracin in the er visit earlier today. +pruritis

## 2022-07-24 ENCOUNTER — EMERGENCY (EMERGENCY)
Facility: HOSPITAL | Age: 32
LOS: 1 days | Discharge: HOME | End: 2022-07-24
Attending: STUDENT IN AN ORGANIZED HEALTH CARE EDUCATION/TRAINING PROGRAM
Payer: MEDICAID

## 2022-07-24 VITALS
TEMPERATURE: 99 F | SYSTOLIC BLOOD PRESSURE: 114 MMHG | OXYGEN SATURATION: 97 % | HEART RATE: 105 BPM | RESPIRATION RATE: 18 BRPM | DIASTOLIC BLOOD PRESSURE: 79 MMHG

## 2022-07-24 VITALS
DIASTOLIC BLOOD PRESSURE: 72 MMHG | SYSTOLIC BLOOD PRESSURE: 117 MMHG | TEMPERATURE: 100 F | RESPIRATION RATE: 18 BRPM | OXYGEN SATURATION: 95 % | WEIGHT: 294.1 LBS | HEART RATE: 117 BPM | HEIGHT: 71 IN

## 2022-07-24 LAB
ANION GAP SERPL CALC-SCNC: 12 MMOL/L — SIGNIFICANT CHANGE UP (ref 5–17)
BASOPHILS # BLD AUTO: 0.02 K/UL — SIGNIFICANT CHANGE UP (ref 0–0.2)
BASOPHILS NFR BLD AUTO: 0.3 % — SIGNIFICANT CHANGE UP (ref 0–2)
BUN SERPL-MCNC: 18 MG/DL — SIGNIFICANT CHANGE UP (ref 7–18)
CALCIUM SERPL-MCNC: 8.8 MG/DL — SIGNIFICANT CHANGE UP (ref 8.4–10.5)
CHLORIDE SERPL-SCNC: 106 MMOL/L — SIGNIFICANT CHANGE UP (ref 96–108)
CO2 SERPL-SCNC: 23 MMOL/L — SIGNIFICANT CHANGE UP (ref 22–31)
CREAT SERPL-MCNC: 1.02 MG/DL — SIGNIFICANT CHANGE UP (ref 0.5–1.3)
EGFR: 100 ML/MIN/1.73M2 — SIGNIFICANT CHANGE UP
EOSINOPHIL # BLD AUTO: 0.18 K/UL — SIGNIFICANT CHANGE UP (ref 0–0.5)
EOSINOPHIL NFR BLD AUTO: 2.3 % — SIGNIFICANT CHANGE UP (ref 0–6)
GLUCOSE SERPL-MCNC: 168 MG/DL — HIGH (ref 70–99)
HCT VFR BLD CALC: 44 % — SIGNIFICANT CHANGE UP (ref 39–50)
HGB BLD-MCNC: 14.1 G/DL — SIGNIFICANT CHANGE UP (ref 13–17)
IMM GRANULOCYTES NFR BLD AUTO: 0.6 % — SIGNIFICANT CHANGE UP (ref 0–1.5)
LYMPHOCYTES # BLD AUTO: 1.77 K/UL — SIGNIFICANT CHANGE UP (ref 1–3.3)
LYMPHOCYTES # BLD AUTO: 22.6 % — SIGNIFICANT CHANGE UP (ref 13–44)
MCHC RBC-ENTMCNC: 24.9 PG — LOW (ref 27–34)
MCHC RBC-ENTMCNC: 32 GM/DL — SIGNIFICANT CHANGE UP (ref 32–36)
MCV RBC AUTO: 77.7 FL — LOW (ref 80–100)
MONOCYTES # BLD AUTO: 0.53 K/UL — SIGNIFICANT CHANGE UP (ref 0–0.9)
MONOCYTES NFR BLD AUTO: 6.8 % — SIGNIFICANT CHANGE UP (ref 2–14)
NEUTROPHILS # BLD AUTO: 5.28 K/UL — SIGNIFICANT CHANGE UP (ref 1.8–7.4)
NEUTROPHILS NFR BLD AUTO: 67.4 % — SIGNIFICANT CHANGE UP (ref 43–77)
NRBC # BLD: 0 /100 WBCS — SIGNIFICANT CHANGE UP (ref 0–0)
PLATELET # BLD AUTO: 241 K/UL — SIGNIFICANT CHANGE UP (ref 150–400)
POTASSIUM SERPL-MCNC: 4.1 MMOL/L — SIGNIFICANT CHANGE UP (ref 3.5–5.3)
POTASSIUM SERPL-SCNC: 4.1 MMOL/L — SIGNIFICANT CHANGE UP (ref 3.5–5.3)
RBC # BLD: 5.66 M/UL — SIGNIFICANT CHANGE UP (ref 4.2–5.8)
RBC # FLD: 15.3 % — HIGH (ref 10.3–14.5)
SODIUM SERPL-SCNC: 141 MMOL/L — SIGNIFICANT CHANGE UP (ref 135–145)
TROPONIN I, HIGH SENSITIVITY RESULT: 4.6 NG/L — SIGNIFICANT CHANGE UP
WBC # BLD: 7.83 K/UL — SIGNIFICANT CHANGE UP (ref 3.8–10.5)
WBC # FLD AUTO: 7.83 K/UL — SIGNIFICANT CHANGE UP (ref 3.8–10.5)

## 2022-07-24 PROCEDURE — 80048 BASIC METABOLIC PNL TOTAL CA: CPT

## 2022-07-24 PROCEDURE — 84484 ASSAY OF TROPONIN QUANT: CPT

## 2022-07-24 PROCEDURE — 85025 COMPLETE CBC W/AUTO DIFF WBC: CPT

## 2022-07-24 PROCEDURE — 36415 COLL VENOUS BLD VENIPUNCTURE: CPT

## 2022-07-24 PROCEDURE — 93005 ELECTROCARDIOGRAM TRACING: CPT

## 2022-07-24 PROCEDURE — 99283 EMERGENCY DEPT VISIT LOW MDM: CPT

## 2022-07-24 PROCEDURE — 99285 EMERGENCY DEPT VISIT HI MDM: CPT

## 2022-07-24 RX ORDER — ACETAMINOPHEN 500 MG
975 TABLET ORAL ONCE
Refills: 0 | Status: COMPLETED | OUTPATIENT
Start: 2022-07-24 | End: 2022-07-24

## 2022-07-24 RX ORDER — SODIUM CHLORIDE 9 MG/ML
1000 INJECTION INTRAMUSCULAR; INTRAVENOUS; SUBCUTANEOUS ONCE
Refills: 0 | Status: COMPLETED | OUTPATIENT
Start: 2022-07-24 | End: 2022-07-24

## 2022-07-24 RX ADMIN — SODIUM CHLORIDE 2000 MILLILITER(S): 9 INJECTION INTRAMUSCULAR; INTRAVENOUS; SUBCUTANEOUS at 19:57

## 2022-07-24 RX ADMIN — Medication 975 MILLIGRAM(S): at 19:56

## 2022-07-24 NOTE — ED PROVIDER NOTE - PATIENT PORTAL LINK FT
You can access the FollowMyHealth Patient Portal offered by [facility] by registering at the following website: [email or web address]. By joining [employer]’s FollowMyHealth portal, you will also be able to view your health information using other applications (apps) compatible with our system.

## 2022-07-24 NOTE — ED PROVIDER NOTE - PROGRESS NOTE DETAILS
Attending Madisyn: labs unremarkable. Pt reports feeling improved. Return precautions provided, pt verbalized understanding. Will DC. Aide at bedside to help arrange transport home.

## 2022-07-24 NOTE — ED PROVIDER NOTE - OBJECTIVE STATEMENT
33 y/o M w/ PMH of asthma, HLD, GERD, hypothyroid, mood disorder, autism spectrum. Seen w/ aide. Reports L sided chest pain today. Triage note stating SOB but pt denying that. Pain is sharp, non radiating, worse w/ touching chest. No specific alleviating factors. No meds prior to arrival. Endorses hitting into AC unit the other day on the L side of his body. Denies fevers, chills, headache, dizziness, blurred vision, cough, abdominal pain, n/v/d/c, urinary symptoms, MSK pain, rash.

## 2022-07-24 NOTE — ED PROVIDER NOTE - CLINICAL SUMMARY MEDICAL DECISION MAKING FREE TEXT BOX
33 y/o M w/ PMH as above presenting w/ L sided chest pain. Pt well appearing, no acute distress. Low suspicion for ACS. Suspect likely muscular origin of pain given he hit into AC unit a few days prior. EMS reported pt was discharged from psych facility but aide denies that, states pt was seen in other emergency department. Pt denying SI/HI/AH/VH. No acute psychiatric concern at this time. Plan for labs, meds, EKG. Expect will likely be able to DC. Will reassess the need for additional interventions as clinically warranted.

## 2022-07-24 NOTE — ED PROVIDER NOTE - NSFOLLOWUPINSTRUCTIONS_ED_ALL_ED_FT
1) Follow up with your doctor this week  2) Return to the ED immediately for new or worsening symptoms  3) Please continue to take any home medications as prescribed  4) Your test results from your ED visit were discussed with you prior to discharge  5) You were provided with a copy of your test results  6) Please take Tylenol 650 mg every 4 hours as needed for pain. Please do not exceed more than 4,000mg of Tylenol in a day     Chest Pain    Chest pain can be caused by many different conditions which may or may not be dangerous. Causes include heartburn, lung infections, heart attack, blood clot in lungs, skin infections, strain or damage to muscle, cartilage, or bones, etc. In addition to a history and physical examination, an electrocardiogram (ECG) or other lab tests may have been performed to determine the cause of your chest pain. Follow up with your primary care provider or with a cardiologist as instructed.     SEEK IMMEDIATE MEDICAL CARE IF YOU HAVE ANY OF THE FOLLOWING SYMPTOMS: worsening chest pain, coughing up blood, unexplained back/neck/jaw pain, severe abdominal pain, dizziness or lightheadedness, fainting, shortness of breath, sweaty or clammy skin, vomiting, or racing heart beat. These symptoms may represent a serious problem that is an emergency. Do not wait to see if the symptoms will go away. Get medical help right away. Call 911 and do not drive yourself to the hospital.

## 2022-07-24 NOTE — ED ADULT TRIAGE NOTE - CHIEF COMPLAINT QUOTE
pt is here c/o chest pain and sob , as per ems pt is coming form home with recent d/c from psych facility , pt denies any si or hi at present spoke with md at time of triage

## 2022-07-24 NOTE — ED PROVIDER NOTE - PHYSICAL EXAMINATION
Gen: NAD, AOx3, able to make needs known, non-toxic  Head: NCAT  HEENT: EOMI, oral mucosa moist, normal conjunctiva  Lung: CTAB, no respiratory distress, no wheezes/rhonchi/rales B/L, speaking in full sentences  CV: RRR, no murmurs  Abd: non distended, soft, nontender, no guarding, no CVA tenderness  MSK: no visible deformities. L upper chest wall tender to palpation w/o crepitus or deformity  Neuro: Appears non focal  Skin: Warm, well perfused  Psych: normal affect

## 2022-07-29 ENCOUNTER — EMERGENCY (EMERGENCY)
Facility: HOSPITAL | Age: 32
LOS: 1 days | Discharge: HOME | End: 2022-07-29
Attending: STUDENT IN AN ORGANIZED HEALTH CARE EDUCATION/TRAINING PROGRAM
Payer: MEDICAID

## 2022-07-29 VITALS
HEIGHT: 71 IN | HEART RATE: 97 BPM | DIASTOLIC BLOOD PRESSURE: 91 MMHG | TEMPERATURE: 98 F | OXYGEN SATURATION: 97 % | SYSTOLIC BLOOD PRESSURE: 145 MMHG | RESPIRATION RATE: 17 BRPM | WEIGHT: 297.62 LBS

## 2022-07-29 LAB
APPEARANCE UR: CLEAR — SIGNIFICANT CHANGE UP
BACTERIA # UR AUTO: ABNORMAL /HPF
BILIRUB UR-MCNC: NEGATIVE — SIGNIFICANT CHANGE UP
COLOR SPEC: YELLOW — SIGNIFICANT CHANGE UP
DIFF PNL FLD: ABNORMAL
EPI CELLS # UR: ABNORMAL /HPF
GLUCOSE UR QL: NEGATIVE — SIGNIFICANT CHANGE UP
KETONES UR-MCNC: ABNORMAL
LEUKOCYTE ESTERASE UR-ACNC: ABNORMAL
NITRITE UR-MCNC: NEGATIVE — SIGNIFICANT CHANGE UP
PH UR: 6 — SIGNIFICANT CHANGE UP (ref 5–8)
PROT UR-MCNC: 15
RBC CASTS # UR COMP ASSIST: ABNORMAL /HPF (ref 0–2)
SP GR SPEC: 1.02 — SIGNIFICANT CHANGE UP (ref 1.01–1.02)
UROBILINOGEN FLD QL: 1
WBC UR QL: SIGNIFICANT CHANGE UP /HPF (ref 0–5)

## 2022-07-29 PROCEDURE — 99283 EMERGENCY DEPT VISIT LOW MDM: CPT

## 2022-07-29 PROCEDURE — 99284 EMERGENCY DEPT VISIT MOD MDM: CPT

## 2022-07-29 PROCEDURE — 87186 SC STD MICRODIL/AGAR DIL: CPT

## 2022-07-29 PROCEDURE — 87086 URINE CULTURE/COLONY COUNT: CPT

## 2022-07-29 PROCEDURE — 81001 URINALYSIS AUTO W/SCOPE: CPT

## 2022-07-29 RX ORDER — ACETAMINOPHEN 500 MG
975 TABLET ORAL ONCE
Refills: 0 | Status: COMPLETED | OUTPATIENT
Start: 2022-07-29 | End: 2022-07-29

## 2022-07-29 RX ORDER — CEFUROXIME AXETIL 250 MG
1 TABLET ORAL
Qty: 14 | Refills: 0
Start: 2022-07-29 | End: 2022-08-04

## 2022-07-29 RX ORDER — CEFUROXIME AXETIL 250 MG
500 TABLET ORAL ONCE
Refills: 0 | Status: COMPLETED | OUTPATIENT
Start: 2022-07-29 | End: 2022-07-29

## 2022-07-29 RX ADMIN — Medication 975 MILLIGRAM(S): at 17:23

## 2022-07-29 RX ADMIN — Medication 500 MILLIGRAM(S): at 17:55

## 2022-07-29 RX ADMIN — Medication 975 MILLIGRAM(S): at 16:03

## 2022-07-29 NOTE — ED PROVIDER NOTE - PHYSICAL EXAMINATION
Gen: NAD, AOx3, able to make needs known, non-toxic  Head: NCAT  HEENT: EOMI, oral mucosa moist, normal conjunctiva  Lung: CTAB, no respiratory distress, no wheezes/rhonchi/rales B/L, speaking in full sentences  CV: RRR, no murmurs  Abd: non distended, soft, nontender, no guarding, no CVA tenderness  MSK: no visible deformities  Neuro: Appears non focal  Skin: Warm, well perfused  Psych: normal affect

## 2022-07-29 NOTE — ED PROVIDER NOTE - PROGRESS NOTE DETAILS
Attending Gracyo: UA w/ weak signs of UTI however given symptoms, will treat. Informed pt. Abx ordered and sent to pharmacy. Return precautions provided, pt verbalized understanding. Ready for DC.

## 2022-07-29 NOTE — ED PROVIDER NOTE - PATIENT PORTAL LINK FT
You can access the FollowMyHealth Patient Portal offered by [facility] by registering at the following website: [email or web address]. By joining [employer]’s FollowMyHealth portal, you will also be able to view your health information using other applications (apps) compatible with our system.

## 2022-07-29 NOTE — ED PROVIDER NOTE - NSFOLLOWUPINSTRUCTIONS_ED_ALL_ED_FT
1) Follow up with your doctor and urologist this week  2) Return to the ED immediately for new or worsening symptoms  3) Please continue to take any home medications as prescribed  4) Your test results from your ED visit were discussed with you prior to discharge  5) You were provided with a copy of your test results  6) Please  your antibiotics at the pharmacy. Please take as directed.    Urinary Tract Infection    A urinary tract infection (UTI) is an infection of any part of the urinary tract, which includes the kidneys, ureters, bladder, and urethra. Risk factors include ignoring your need to urinate, wiping back to front if female, being an uncircumcised male, and having diabetes or a weak immune system. Symptoms include frequent urination, pain or burning with urination, foul smelling urine, cloudy urine, pain in the lower abdomen, blood in the urine, and fever. If you were prescribed an antibiotic medicine, take it as told by your health care provider. Do not stop taking the antibiotic even if you start to feel better.    SEEK IMMEDIATE MEDICAL CARE IF YOU HAVE ANY OF THE FOLLOWING SYMPTOMS: severe back or abdominal pain, fever, inability to keep fluids or medicine down, dizziness/lightheadedness, or a change in mental status.

## 2022-07-29 NOTE — ED PROVIDER NOTE - CLINICAL SUMMARY MEDICAL DECISION MAKING FREE TEXT BOX
31 y/o M w/ PMH as above presenting w/ hematuria and dysuria. Pt well appearing, no acute distress. Abd benign on exam. Will eval for hemorrhagic cystitis. Doubtful kidney stone given no flank pain. Vitals stable. Plan for urine studies, tylenol. Will reassess the need for additional interventions as clinically warranted.

## 2022-08-01 ENCOUNTER — EMERGENCY (EMERGENCY)
Facility: HOSPITAL | Age: 32
LOS: 1 days | Discharge: HOME | End: 2022-08-01
Attending: EMERGENCY MEDICINE | Admitting: EMERGENCY MEDICINE
Payer: MEDICAID

## 2022-08-01 VITALS
RESPIRATION RATE: 16 BRPM | TEMPERATURE: 98 F | DIASTOLIC BLOOD PRESSURE: 86 MMHG | OXYGEN SATURATION: 95 % | HEIGHT: 71 IN | WEIGHT: 285.06 LBS | SYSTOLIC BLOOD PRESSURE: 125 MMHG | HEART RATE: 100 BPM

## 2022-08-01 VITALS — RESPIRATION RATE: 18 BRPM | HEART RATE: 82 BPM | OXYGEN SATURATION: 96 %

## 2022-08-01 PROCEDURE — 99283 EMERGENCY DEPT VISIT LOW MDM: CPT

## 2022-08-01 PROCEDURE — 99284 EMERGENCY DEPT VISIT MOD MDM: CPT

## 2022-08-01 PROCEDURE — 93005 ELECTROCARDIOGRAM TRACING: CPT

## 2022-08-01 RX ORDER — IBUPROFEN 200 MG
600 TABLET ORAL ONCE
Refills: 0 | Status: COMPLETED | OUTPATIENT
Start: 2022-08-01 | End: 2022-08-01

## 2022-08-01 RX ADMIN — Medication 600 MILLIGRAM(S): at 22:10

## 2022-08-01 NOTE — ED PROVIDER NOTE - CARE PROVIDER_API CALL
Len Lutz)  Urology  [address] [place] [address]  Phone: [phone]  Fax: [phone]  Follow Up Time: 1-3 Days

## 2022-08-01 NOTE — ED PROVIDER NOTE - OBJECTIVE STATEMENT
32yoM with h/o intellectual disability, autism spectrum, well known to the ED, presents with his aide from group home with hematuria and some pain to the penis when he urinates. Also reports intermittent L sided CP. Denies back pain, fever, vomiting, and all other acute symptoms.

## 2022-08-01 NOTE — ED ADULT TRIAGE NOTE - CHIEF COMPLAINT QUOTE
BIBA, ems states pt initially called for c/o hematuria then once in the ambulance also c/o chest pain, pt talking and breathing normally, no distress noted

## 2022-08-01 NOTE — ED PROVIDER NOTE - PHYSICAL EXAMINATION
Afebrile, hemodynamically stable, saturating well  NAD, well appearing, sitting comfortably in bed, no WOB, speaking full sentences  Head NCAT  EOMI grossly, anicteric  MMM  No JVD  RRR, nml S1/S2, no m/r/g  Lungs CTAB, no w/r/r  Abd soft, NT, ND, nml BS, no rebound or guarding, no CVAT   with chaperone: no blood at urethral meatus  AAO, CN's 3-12 grossly intact  SALAS spontaneously, no leg cyanosis or edema  Skin warm, well perfused, no rashes or hives

## 2022-08-01 NOTE — ED PROVIDER NOTE - PATIENT PORTAL LINK FT
You can access the FollowMyHealth Patient Portal offered by [facility] by registering at the following website: [email or web address]. By joining [employer]’s FollowMyHealth portal, you will also be able to view your health information using other applications (apps) compatible with our system.

## 2022-08-01 NOTE — ED PROVIDER NOTE - CLINICAL SUMMARY MEDICAL DECISION MAKING FREE TEXT BOX
Visualized urine here and is clear. Pt voided spontaneously. No abd distension and low suspicion for retention. No CVAT and low suspicion for kidney stone. UA here 2 days ago with no e/o UTI. Patient is well appearing, NAD, afebrile, hemodynamically stable. Given ibuprofen. Any available tests and studies were discussed with patient and aide. Discharged with instructions in further symptomatic care, return precautions, and need for urology f/u. Visualized urine here and is clear. Pt voided spontaneously. No abd distension and low suspicion for retention. No CVAT and low suspicion for kidney stone. UA here 2 days ago with no e/o UTI.   RE CP: Character low suspicion for dissection and no murmur or pulse asymmetry. Character low suspicion for PE and no hypoxia or e/o DVT or acute risk factors for this. Character low suspicion for ACS and ECG unremarkable. No e/o PNA, PTX, or fluid overload on exam. Patient is well appearing, NAD, afebrile, hemodynamically stable. Given ibuprofen. Any available tests and studies were discussed with patient and aide. Discharged with instructions in further symptomatic care, return precautions, and need for urology f/u.

## 2022-08-01 NOTE — ED PROVIDER NOTE - NSFOLLOWUPINSTRUCTIONS_ED_ALL_ED_FT
Please follow up with a urologist in 1-2 days.  Please use ibuprofen as needed for pain.  Please keep well hydrated.  Please return to the emergency department if you have worsening pain, shortness of breath, vomiting, fever, or any other symptoms.      Hematuria    WHAT YOU NEED TO KNOW:    Hematuria is blood in your urine. Your urine may be bright red to dark brown.    DISCHARGE INSTRUCTIONS:    Return to the emergency department if:   •You have blood in your urine after a new injury, such as a fall.  •You have severe back or side pain that does not go away with treatment.    Call your doctor if:   •You are urinating very small amounts or not at all.  •You feel like you cannot empty your bladder.  •You have a fever that gets worse or does not go away with treatment.  •You cannot keep liquids or medicines down.  •Your urine gets darker, even after you drink extra liquids.  •You have questions or concerns about your condition, treatment, or care.    Drink liquids as directed: You may need to drink extra liquids to help flush the blood from your body through your urine. Water is the best liquid to drink. Ask how much liquid to drink each day and which liquids are best for you.    Follow up with your doctor as directed: Write down your questions so you remember to ask them during your visits.

## 2022-08-02 NOTE — ED PROVIDER NOTE - IV ALTEPLASE ADMIN OUTSIDE HIDDEN
Operative Note    Patient: Chelly Winston 76 year old male    MRN: [id number]    Surgeon(s): Chadwick Morales MD  Phone Number: [phone]                       Surgeon(s) and Role:     * Chadwick Morales MD - Primary    Assistant(s):     Pre-Op Diagnosis: complex cataract right eye     Post-Op Diagnosis: same     Procedure: Procedure(s):  COMPLEX Femtosecond laser assisted cataract surgery right eye, phacoemulsification of cataract with insertion of intraocular lens implant right eye    Anesthesia Type: MAC                                   Complications: None    Description:       CATARACT SURGERY with Femtosecond laser assist, Right EYE      The patient was brought  OR of [facility]   The patient underwent femtosecond laser lens fragmentation, anterior capsulorrhexis, main wound creation and arcuate incisions. The patient was then brought to the OR.  The patient was prepped and draped in the usual fashion; I placed tape on the lashes and an eyelid speculum.        I made stab incisions from my right and  left sides and used the blunted keratome tool to open the femtosecond laser incision temporally.   I put shugarcaine anesthetic   Into the eye.    I then injected visco-elastic into the Anterior Chamber. Because the pupil began to constrict, precluding adequate surgical view, I decided to place a malyugin ring. a malyugin ring was inserted to optimize the surgical view of the lens.     Then I removed the capsule which had been nicely fashioned by the FEMTO laser.     Then I did a hydro-dissection, phaco-emulsification and irrigation  / aspiration of the lens contents.      Then I place a lens implant into the capsular bag and rotated into place.     I then removed the malyugin ring    I removed the viscoelastic, brought the eye under high pressure which it held so no suture was needed,     I released the pressure.      Put npd ointment and betadine onto the eye surface then I placed a patch and a shield.    The  patient tolerated the procedure well and left the OR in good condition.          Findings: complex cataract    Specimens Removed: No specimens collected     Estimated Blood Loss: No Value <1 mL     Assistant Tasks: None     Implants:   Implant Name Type Inv. Item Serial No.  Lot No. LRB No. Used Action   LENS IOL PC 0 D +19 CHRISTI MOD L BCNVX ACRYSOF IQ - [id number] Lens LENS IOL PC 0 D +19 CHRISTI MOD L BCNVX ACRYSOF IQ [id number] [facility]  Right 1 Implanted       I was present for the key portions of the procedure and was immediately available for the non-key portions           show

## 2022-08-02 NOTE — ED PROVIDER NOTE - CLINICAL SUMMARY MEDICAL DECISION MAKING FREE TEXT BOX
[Normal] : oriented to person, place and time, with appropriate affect [de-identified] : incision CDI 32 y/o man, h/o factitious disorder, intellectual disability, hypothyroidism, BIBA from [facility] urgent care c/o injury to left forearm from accidental fall at the bottom of staircase today, with no other injury.  He already had X-rays at urgent care of L wrist, forearm, and elbow which were normal, no fracture.  However staff member from his [facility], Emily, called and says that Pt has been threatening to kill other staff members and kill himself, and specifically threatened to "cut his hand off"--medical clearance workup and telepsych consult;  no further workup for left arm injury is indicated.

## 2022-08-05 NOTE — BH CONSULTATION LIAISON ASSESSMENT NOTE - NSBHMSEMOOD_PSY_A_CORE
"Medication Therapy Management (MTM) Encounter    ASSESSMENT:                            Medication Adherence/Access: No issues identified    ADHD: Stable. No issues identified    Allergies: Singulair does have FDA boxed warning for Serious neuropsychiatric events:  \"Serious neuropsychiatric (NP) events have been reported with the use of montelukast. The types of events reported were highly variable, and included, but were not limited to, agitation, aggression, depression, sleep disturbances, suicidal thoughts and behavior (including suicide). The mechanisms underlying NP events associated with montelukast use are currently not well understood.\"    We discussed that there isn't anything tested as part of pt's Genesight testing (scanned in 2016) that would be associated with increased risk of psychiatric side effects with Singulair. To my knowledge, there is no identified gene associated with these side effects. We reviewed the metabolism of Singulair (CYP2C9 and CY minor substrate). Pt has normal activity at both of these enzymes therefore normal dosing could be pursued if Singulair is started.       PLAN:                            1. Singular reviewed - no pharmacogenetic interactions.   2. Pt requested copy of [employer] Equipment[employer] results mailed to him - message sent to psychiatry intake for assistance with this.      Follow-up: as needed    SUBJECTIVE/OBJECTIVE:                          Isak Drummond is a 27 year old male contacted via secure video for an initial visit. He was referred to me from psychiatry - Dr. Sorenson. Patient was accompanied by his mother.     Referral Reason: ADHD& hx mood disorder. GeneSight testing  (see [employer]).  Prescribed Montelukast by another MD.  Family worried since it has Black Box warning for psych SEs.  Wants risk/benefits based on GeneSight    Allergies/ADRs: None  Past Medical History: Reviewed in chart  Tobacco: He reports that he has never smoked. He has never used " smokeless tobacco.  Alcohol: none    Medication Adherence/Access: no issues reported    ADHD:   Current medications:   Guanfacine ER 2mg daily    Pt is seen at [facility] by Mary Sorenson MS. Most recent visit was 8/1/22 at which time no medication changes were made. Pt has been stable on Intuniv for ADHD for some time, no issues or side effects noted. Pt and mom are interested in getting more information about montelukast (see allergies below).     Allergies: Singulair was recently recommended by Isak's allergist. Pt and mom have been hesitant to start it due to boxed warning for neuropyschiatric side effects. Mom is well-versed and researched articles about this. They also wonder if there is anything on pt's PGx Genesight report that would put patient at higher risk for side effects (similar to increased risk of rash with Lamictal, Tegretol).     ----------------      I spent 15 minutes with this patient today. A copy of the visit note was provided to the patient's provider(s).    The patient declined a summary of these recommendations.     Selina Stover, PharmD, BCPP  Medication Therapy Management Pharmacist  [facility]    Telemedicine Visit Details  Type of service:  Video Conference via [email or web address]  Start Time: 1:30 PM  End Time: 1:45 PM  Originating Location (patient location): Home  Distant Location (provider location):  [facility]     Medication Therapy Recommendations  No medication therapy recommendations to display        Normal

## 2022-08-16 ENCOUNTER — EMERGENCY (EMERGENCY)
Facility: HOSPITAL | Age: 32
LOS: 1 days | Discharge: HOME | End: 2022-08-16
Attending: EMERGENCY MEDICINE
Payer: MEDICAID

## 2022-08-16 ENCOUNTER — EMERGENCY (EMERGENCY)
Facility: HOSPITAL | Age: 32
LOS: 1 days | Discharge: HOME | End: 2022-08-16
Attending: EMERGENCY MEDICINE

## 2022-08-16 VITALS
SYSTOLIC BLOOD PRESSURE: 127 MMHG | RESPIRATION RATE: 18 BRPM | HEIGHT: 71 IN | DIASTOLIC BLOOD PRESSURE: 89 MMHG | OXYGEN SATURATION: 95 % | TEMPERATURE: 98 F | HEART RATE: 98 BPM

## 2022-08-16 VITALS
TEMPERATURE: 98 F | DIASTOLIC BLOOD PRESSURE: 96 MMHG | HEIGHT: 71 IN | WEIGHT: 289.91 LBS | HEART RATE: 97 BPM | SYSTOLIC BLOOD PRESSURE: 145 MMHG | OXYGEN SATURATION: 95 % | RESPIRATION RATE: 16 BRPM

## 2022-08-16 VITALS
TEMPERATURE: 98 F | SYSTOLIC BLOOD PRESSURE: 121 MMHG | OXYGEN SATURATION: 99 % | HEART RATE: 86 BPM | RESPIRATION RATE: 18 BRPM | DIASTOLIC BLOOD PRESSURE: 84 MMHG

## 2022-08-16 LAB
ANION GAP SERPL CALC-SCNC: 8 MMOL/L — SIGNIFICANT CHANGE UP (ref 5–17)
APPEARANCE UR: CLEAR — SIGNIFICANT CHANGE UP
BILIRUB UR-MCNC: NEGATIVE — SIGNIFICANT CHANGE UP
BUN SERPL-MCNC: 13 MG/DL — SIGNIFICANT CHANGE UP (ref 7–18)
CALCIUM SERPL-MCNC: 9.3 MG/DL — SIGNIFICANT CHANGE UP (ref 8.4–10.5)
CHLORIDE SERPL-SCNC: 105 MMOL/L — SIGNIFICANT CHANGE UP (ref 96–108)
CO2 SERPL-SCNC: 27 MMOL/L — SIGNIFICANT CHANGE UP (ref 22–31)
COLOR SPEC: YELLOW — SIGNIFICANT CHANGE UP
CREAT SERPL-MCNC: 0.79 MG/DL — SIGNIFICANT CHANGE UP (ref 0.5–1.3)
DIFF PNL FLD: NEGATIVE — SIGNIFICANT CHANGE UP
EGFR: 120 ML/MIN/1.73M2 — SIGNIFICANT CHANGE UP
GLUCOSE SERPL-MCNC: 102 MG/DL — HIGH (ref 70–99)
GLUCOSE UR QL: NEGATIVE — SIGNIFICANT CHANGE UP
KETONES UR-MCNC: NEGATIVE — SIGNIFICANT CHANGE UP
LEUKOCYTE ESTERASE UR-ACNC: NEGATIVE — SIGNIFICANT CHANGE UP
NITRITE UR-MCNC: NEGATIVE — SIGNIFICANT CHANGE UP
PH UR: 6 — SIGNIFICANT CHANGE UP (ref 5–8)
POTASSIUM SERPL-MCNC: 4.5 MMOL/L — SIGNIFICANT CHANGE UP (ref 3.5–5.3)
POTASSIUM SERPL-SCNC: 4.5 MMOL/L — SIGNIFICANT CHANGE UP (ref 3.5–5.3)
PROT UR-MCNC: NEGATIVE — SIGNIFICANT CHANGE UP
SODIUM SERPL-SCNC: 140 MMOL/L — SIGNIFICANT CHANGE UP (ref 135–145)
SP GR SPEC: 1.01 — SIGNIFICANT CHANGE UP (ref 1.01–1.02)
UROBILINOGEN FLD QL: NEGATIVE — SIGNIFICANT CHANGE UP

## 2022-08-16 PROCEDURE — 80048 BASIC METABOLIC PNL TOTAL CA: CPT

## 2022-08-16 PROCEDURE — 99282 EMERGENCY DEPT VISIT SF MDM: CPT

## 2022-08-16 PROCEDURE — 99284 EMERGENCY DEPT VISIT MOD MDM: CPT

## 2022-08-16 PROCEDURE — 81003 URINALYSIS AUTO W/O SCOPE: CPT

## 2022-08-16 PROCEDURE — 99283 EMERGENCY DEPT VISIT LOW MDM: CPT

## 2022-08-16 PROCEDURE — 87086 URINE CULTURE/COLONY COUNT: CPT

## 2022-08-16 PROCEDURE — 36415 COLL VENOUS BLD VENIPUNCTURE: CPT

## 2022-08-16 RX ORDER — DIVALPROEX SODIUM 500 MG/1
250 TABLET, DELAYED RELEASE ORAL ONCE
Refills: 0 | Status: COMPLETED | OUTPATIENT
Start: 2022-08-16 | End: 2022-08-16

## 2022-08-16 RX ORDER — RISPERIDONE 4 MG/1
4 TABLET ORAL ONCE
Refills: 0 | Status: COMPLETED | OUTPATIENT
Start: 2022-08-16 | End: 2022-08-16

## 2022-08-16 RX ORDER — SODIUM CHLORIDE 9 MG/ML
1000 INJECTION INTRAMUSCULAR; INTRAVENOUS; SUBCUTANEOUS
Refills: 0 | Status: DISCONTINUED | OUTPATIENT
Start: 2022-08-16 | End: 2022-08-19

## 2022-08-16 RX ADMIN — RISPERIDONE 4 MILLIGRAM(S): 4 TABLET ORAL at 09:55

## 2022-08-16 RX ADMIN — DIVALPROEX SODIUM 250 MILLIGRAM(S): 500 TABLET, DELAYED RELEASE ORAL at 09:56

## 2022-08-16 RX ADMIN — SODIUM CHLORIDE 1000 MILLILITER(S): 9 INJECTION INTRAMUSCULAR; INTRAVENOUS; SUBCUTANEOUS at 08:34

## 2022-08-16 RX ADMIN — SODIUM CHLORIDE 1000 MILLILITER(S): 9 INJECTION INTRAMUSCULAR; INTRAVENOUS; SUBCUTANEOUS at 09:34

## 2022-08-16 RX ADMIN — Medication 1 MILLIGRAM(S): at 09:56

## 2022-08-16 NOTE — ED PROVIDER NOTE - PROGRESS NOTE DETAILS
Pt with h/o chief complaint of difficulty urinating in the past, from previous aides pt has presented with complaints suggesting urinary retention in effort to get mckeon catheter. Pt aware that he will have to be admitted if needing mckeon as his group home does not manage catheters. Pt voided completely per pt but in toilet. Received 250cc of IV fluids so far. Will give additional fluids and send UA. Vani Gupta now at bedside who relates pt currently on abx for past 2 days based on his recent urine culture results done by PMD. Plan to send urine culture and d/c back to group home.

## 2022-08-16 NOTE — ED PROVIDER NOTE - CLINICAL SUMMARY MEDICAL DECISION MAKING FREE TEXT BOX
Pt with history of mood disorder presents after picking a scab on his abdomen with the top of a shampoo bottle.  + superficial abrasion.  Wound irrigated and bandaged in ED.  Vaccination up to date.  Will dc back to facility with chaperone.

## 2022-08-16 NOTE — ED PROVIDER NOTE - OBJECTIVE STATEMENT
33 y/o male sent to ED for self injurious behavior.  Patient with a past medical history of autism, mood disorder, HLD, GERD, asthma presents after self inflicted wound to abdomen.  Pt admits to being upset after being discharged from hosptial earlier.  Pt was seen for urinary retention.  pt was discharged after voiding and having a negative UA. While back at the facility the patient took the cap to the shampoo bottle and tried to pick a stab which was on his abdomen.  pt denies suicidal or homicidal ideation.

## 2022-08-16 NOTE — ED PROVIDER NOTE - OBJECTIVE STATEMENT
33 y/o M w/ PMH of asthma, HLD, GERD, hypothyroid, mood disorder, autism, BPH p/w difficulty urinating since last night. Pt reports no abd pressure, nausea, fever, chills or blood in urine previously. No aide currently at bedside.

## 2022-08-16 NOTE — ED PROVIDER NOTE - NSFOLLOWUPINSTRUCTIONS_ED_ALL_ED_FT
Enlarged Prostate (BPH)    WHAT YOU NEED TO KNOW:    What do I need to know about an enlarged prostate (BPH)? An enlarged prostate (BPH) is a common condition in older adults. BPH develops because the number of prostate cells increases (hyperplasia) or the cells get bigger (hypertrophy). The prostate wraps around the urethra. An enlarged prostate can press on the urethra. This may cause problems with storing urine or emptying your bladder completely.  Male Reproductive System         What are the signs and symptoms of BPH?   •Urinating 8 or more times each day      •A feeling of not fully emptying your bladder when you urinate      •An urgent need to urinate that you could not put off, or urinating again within 2 hours      •Being woken from sleep because you needed to urinate      •Trouble starting your urine flow, or a need to push or strain to get it to start      •Urine that stops and starts several times when you urinate      •A weak urine stream, or dribbling after you urinate      How is BPH diagnosed?   •A digital rectal exam is used to check the size of your prostate. Your healthcare provider will insert a gloved finger into your rectum. The provider will be able to feel your prostate. The size of your prostate may be checked with ultrasound pictures.      •Urine tests may show infection, or strength and amount of urine flow. You may need to record how often and how much you urinate for 24 hours.      •Blood tests may show kidney problems or your PSA level. PSA is a substance produced by your prostate. PSA levels increase when you have BPH.      •A postvoid residual volume test is used to measure the amount of urine left in your bladder after you urinate.      How is BPH treated?   •Watchful waiting means you do not receive treatment right away. Your signs and symptoms will be monitored over time to see if they get worse. You may be asked to keep a record and bring it to follow-up visits. This record may include when you urinate, how easy or difficult it was, and any changes in urination.      •Medicines may be used to relax the muscles in your prostate and bladder. This may help you urinate more easily. You may also need medicine that helps shrink, or slow the growth of your prostate.      •A procedure may be used to place a stent into your urethra to hold it open. A stent is a short, tiny mesh tube. A prostatic urethral lift, or UroLift, may be used to hold the prostate away from the urethra. This makes the urethra wider so urine can pass through more easily.      •Surgery may be used to relieve your symptoms if other treatments do not work. Extra tissue that is causing your symptoms may be destroyed. All or part of your prostate may be removed during another type of surgery.      What can I do to manage my symptoms?   •Urinate on a regular schedule. This will train your bladder to hold urine longer. A larger amount of urine may make it easier to urinate.      •Drink less liquid during the day. Do not have liquid for several hours before you go to bed at night. Do not drink large amounts of any liquid at one time.      •Limit alcohol and caffeine. These can irritate your bladder and make your symptoms worse.      •Eat less salt. Salt can cause fluid buildup and make it harder to urinate. Examples of salty foods are chips, cured meats, and canned soups. Do not use table salt.      •Elevate your legs if you have swelling. Elevate (raise) your legs above the level of your heart. This can relieve swelling caused by fluid buildup. You may not have to get up in the night to urinate.  Elevate Leg - Male           •Exercise regularly. Exercise can help improve your symptoms. Ask your healthcare provider what a healthy weight for you is. Aim to get at least 30 minutes of exercise on most days of the week.  Walking for Exercise           When should I call my doctor?   •You see blood in your urine.      •You are not able to urinate.      •Your bladder feels very full and painful.      •You have new or worsening symptoms.      •You have a fever.      •You have questions or concerns about your condition or care.      CARE AGREEMENT:    You have the right to help plan your care. Learn about your health condition and how it may be treated. Discuss treatment options with your healthcare providers to decide what care you want to receive. You always have the right to refuse treatment.

## 2022-08-16 NOTE — ED ADULT NURSE NOTE - ED STAT RN HANDOFF DETAILS
Endorsed to Rose BRIONES abdominal wound cleaned with saline, covered with gauze. BLadder scan showed 359cc urine, patient voided 350cc. Discharged by Rose BRIONES

## 2022-08-16 NOTE — ED ADULT NURSE NOTE - OBJECTIVE STATEMENT
presents to ED with c/o difficulty urinating and with self inflicted superficial wound to left side abdomen.

## 2022-08-16 NOTE — ED PROVIDER NOTE - PATIENT PORTAL LINK FT
You can access the FollowMyHealth Patient Portal offered by [facility] by registering at the following website: [email or web address]. By joining [employer]’s FollowMyHealth portal, you will also be able to view your health information using other applications (apps) compatible with our system.

## 2022-08-16 NOTE — ED PROVIDER NOTE - PATIENT PORTAL LINK FT
Continue current dose of warfarin as instructed on dosing calendar provided. Continue to monitor urine and stool for signs and symptoms of bleeding. Please notify the clinic of any medication changes. Please remember to bring all medications (both prescription and OTC) to your next visit. Kindly notify the clinic if you are unable to make to your next appointment.
You can access the FollowMyHealth Patient Portal offered by [facility] by registering at the following website: [email or web address]. By joining [employer]’s FollowMyHealth portal, you will also be able to view your health information using other applications (apps) compatible with our system.

## 2022-08-16 NOTE — ED ADULT NURSE NOTE - ED STAT RN HANDOFF DETAILS
Patient alert and oriented x 3. Not in any acute distress. Endorsed to ANALI Whitfield for continuity of care.

## 2022-08-16 NOTE — ED ADULT NURSE NOTE - NSFALLRSKPASTHIST_ED_ALL_ED
Next appt 8/31/2022   Last appt 7-15-22    Refill Requested / Last Refill Info:  lamoTRIgine (LaMICtal) 100 MG tablet 180 tablet 0 5/11/2022 8/9/2022    Sig - Route: TAKE 1 TABLET BY MOUTH 2 TIMES DAILY. - Oral      Refill completed per standing protocol     Thank you, Refill Center Staff    
no

## 2022-08-16 NOTE — ED PROVIDER NOTE - CLINICAL SUMMARY MEDICAL DECISION MAKING FREE TEXT BOX
31 yo M h/o BPH p/w difficulty urinating since last night. Abd soft. Bladder not distended on exam. Will check bladder scan, administer IV fluids and plan to check UA.

## 2022-08-17 ENCOUNTER — EMERGENCY (EMERGENCY)
Facility: HOSPITAL | Age: 32
LOS: 1 days | Discharge: HOME | End: 2022-08-17
Attending: EMERGENCY MEDICINE
Payer: MEDICAID

## 2022-08-17 VITALS
TEMPERATURE: 98 F | OXYGEN SATURATION: 95 % | RESPIRATION RATE: 18 BRPM | HEIGHT: 71 IN | SYSTOLIC BLOOD PRESSURE: 150 MMHG | HEART RATE: 101 BPM | DIASTOLIC BLOOD PRESSURE: 90 MMHG | WEIGHT: 304.02 LBS

## 2022-08-17 VITALS
SYSTOLIC BLOOD PRESSURE: 140 MMHG | HEART RATE: 99 BPM | TEMPERATURE: 98 F | DIASTOLIC BLOOD PRESSURE: 87 MMHG | OXYGEN SATURATION: 95 % | RESPIRATION RATE: 18 BRPM

## 2022-08-17 LAB
ALBUMIN SERPL ELPH-MCNC: 3.1 G/DL — LOW (ref 3.5–5)
ALP SERPL-CCNC: 96 U/L — SIGNIFICANT CHANGE UP (ref 40–120)
ALT FLD-CCNC: 106 U/L DA — HIGH (ref 10–60)
ANION GAP SERPL CALC-SCNC: 13 MMOL/L — SIGNIFICANT CHANGE UP (ref 5–17)
AST SERPL-CCNC: 47 U/L — HIGH (ref 10–40)
BASOPHILS # BLD AUTO: 0.03 K/UL — SIGNIFICANT CHANGE UP (ref 0–0.2)
BASOPHILS NFR BLD AUTO: 0.5 % — SIGNIFICANT CHANGE UP (ref 0–2)
BILIRUB SERPL-MCNC: 0.2 MG/DL — SIGNIFICANT CHANGE UP (ref 0.2–1.2)
BUN SERPL-MCNC: 14 MG/DL — SIGNIFICANT CHANGE UP (ref 7–18)
CALCIUM SERPL-MCNC: 8.6 MG/DL — SIGNIFICANT CHANGE UP (ref 8.4–10.5)
CHLORIDE SERPL-SCNC: 105 MMOL/L — SIGNIFICANT CHANGE UP (ref 96–108)
CO2 SERPL-SCNC: 21 MMOL/L — LOW (ref 22–31)
CREAT SERPL-MCNC: 1.04 MG/DL — SIGNIFICANT CHANGE UP (ref 0.5–1.3)
D DIMER BLD IA.RAPID-MCNC: <150 NG/ML DDU — SIGNIFICANT CHANGE UP
EGFR: 97 ML/MIN/1.73M2 — SIGNIFICANT CHANGE UP
EOSINOPHIL # BLD AUTO: 0.33 K/UL — SIGNIFICANT CHANGE UP (ref 0–0.5)
EOSINOPHIL NFR BLD AUTO: 5 % — SIGNIFICANT CHANGE UP (ref 0–6)
GLUCOSE SERPL-MCNC: 218 MG/DL — HIGH (ref 70–99)
HCT VFR BLD CALC: 42.4 % — SIGNIFICANT CHANGE UP (ref 39–50)
HGB BLD-MCNC: 13.7 G/DL — SIGNIFICANT CHANGE UP (ref 13–17)
IMM GRANULOCYTES NFR BLD AUTO: 1.1 % — SIGNIFICANT CHANGE UP (ref 0–1.5)
LYMPHOCYTES # BLD AUTO: 2.31 K/UL — SIGNIFICANT CHANGE UP (ref 1–3.3)
LYMPHOCYTES # BLD AUTO: 34.8 % — SIGNIFICANT CHANGE UP (ref 13–44)
MCHC RBC-ENTMCNC: 25.2 PG — LOW (ref 27–34)
MCHC RBC-ENTMCNC: 32.3 GM/DL — SIGNIFICANT CHANGE UP (ref 32–36)
MCV RBC AUTO: 77.9 FL — LOW (ref 80–100)
MONOCYTES # BLD AUTO: 0.62 K/UL — SIGNIFICANT CHANGE UP (ref 0–0.9)
MONOCYTES NFR BLD AUTO: 9.3 % — SIGNIFICANT CHANGE UP (ref 2–14)
NEUTROPHILS # BLD AUTO: 3.28 K/UL — SIGNIFICANT CHANGE UP (ref 1.8–7.4)
NEUTROPHILS NFR BLD AUTO: 49.3 % — SIGNIFICANT CHANGE UP (ref 43–77)
NRBC # BLD: 0 /100 WBCS — SIGNIFICANT CHANGE UP (ref 0–0)
PLATELET # BLD AUTO: 196 K/UL — SIGNIFICANT CHANGE UP (ref 150–400)
POTASSIUM SERPL-MCNC: 4 MMOL/L — SIGNIFICANT CHANGE UP (ref 3.5–5.3)
POTASSIUM SERPL-SCNC: 4 MMOL/L — SIGNIFICANT CHANGE UP (ref 3.5–5.3)
PROT SERPL-MCNC: 6.5 G/DL — SIGNIFICANT CHANGE UP (ref 6–8.3)
RBC # BLD: 5.44 M/UL — SIGNIFICANT CHANGE UP (ref 4.2–5.8)
RBC # FLD: 14.9 % — HIGH (ref 10.3–14.5)
SARS-COV-2 RNA SPEC QL NAA+PROBE: SIGNIFICANT CHANGE UP
SODIUM SERPL-SCNC: 139 MMOL/L — SIGNIFICANT CHANGE UP (ref 135–145)
TROPONIN I, HIGH SENSITIVITY RESULT: 5.4 NG/L — SIGNIFICANT CHANGE UP
WBC # BLD: 6.64 K/UL — SIGNIFICANT CHANGE UP (ref 3.8–10.5)
WBC # FLD AUTO: 6.64 K/UL — SIGNIFICANT CHANGE UP (ref 3.8–10.5)

## 2022-08-17 PROCEDURE — 99285 EMERGENCY DEPT VISIT HI MDM: CPT

## 2022-08-17 PROCEDURE — 71045 X-RAY EXAM CHEST 1 VIEW: CPT | Mod: 26

## 2022-08-17 PROCEDURE — 36415 COLL VENOUS BLD VENIPUNCTURE: CPT

## 2022-08-17 PROCEDURE — 85025 COMPLETE CBC W/AUTO DIFF WBC: CPT

## 2022-08-17 PROCEDURE — 93005 ELECTROCARDIOGRAM TRACING: CPT

## 2022-08-17 PROCEDURE — 99285 EMERGENCY DEPT VISIT HI MDM: CPT | Mod: 25

## 2022-08-17 PROCEDURE — 85379 FIBRIN DEGRADATION QUANT: CPT

## 2022-08-17 PROCEDURE — 84484 ASSAY OF TROPONIN QUANT: CPT

## 2022-08-17 PROCEDURE — 87635 SARS-COV-2 COVID-19 AMP PRB: CPT

## 2022-08-17 PROCEDURE — 71045 X-RAY EXAM CHEST 1 VIEW: CPT

## 2022-08-17 PROCEDURE — 80053 COMPREHEN METABOLIC PANEL: CPT

## 2022-08-17 RX ORDER — ACETAMINOPHEN 500 MG
650 TABLET ORAL ONCE
Refills: 0 | Status: COMPLETED | OUTPATIENT
Start: 2022-08-17 | End: 2022-08-17

## 2022-08-17 RX ADMIN — Medication 650 MILLIGRAM(S): at 03:27

## 2022-08-17 RX ADMIN — Medication 650 MILLIGRAM(S): at 03:28

## 2022-08-17 NOTE — ED PROVIDER NOTE - OBJECTIVE STATEMENT
32 year old male with a PMHx of asthma, HLD, GERD, hypothyroid, mood disorder, autism, BPH is presenting to the ED with chief complaint of chest pain since 8pm last night. On evaluation, patient is in no distress. Patient denies shortness of breath, cough, or vomiting.

## 2022-08-17 NOTE — ED PROVIDER NOTE - NSFOLLOWUPCLINICS_GEN_ALL_ED_FT
[facility]  Internal Medicine  [address] [place] [address]  Phone: [phone]  Fax: [phone]

## 2022-08-17 NOTE — ED PROVIDER NOTE - CONSTITUTIONAL, MLM
normal... No acute distress.  Well appearing, awake, alert, oriented to person, place, time/situation.

## 2022-08-17 NOTE — ED ADULT NURSE NOTE - PAIN RATING/NUMBER SCALE (0-10): ACTIVITY
Problem: Nutrition Deficit  Goal: *Optimize nutritional status  Nutrition F/U:  Assessment:  · Diet order: 12-23: Mechanical soft, honey thick with finely chopped meats and vegetables and extra gravy and sauces. No mixed consistencies. Based on SLP eval  · FT was not replaced after bronch on 12-21. As pt was to be NPO for leg wound debridement on 12-22 and then swallow evaluated 12-23 and po diet was able to be started. · Pt reports he is eating \"pretty good\" but is unable to quantify how much he eats. He says his appetite is a 9 out of 10. He does not like yogurt but is receptive to no sugar added honey thick shake. · K 3.1-receiving K supplementation today  · Phos 1.7 yesterday-receiving IV and oral phos supplementation  · POC Glucoses: 117-168 mg/dl for the 3 days. Has received 0-10 units SSI/d for the past 6 days. Receiving 20 units Lantus q hs. Hx of DM. Last 3 Recorded Weights in this Encounter    12/16/17 0606 12/17/17 0205 12/18/17 0133   Weight: 85.6 kg (188 lb 11.4 oz) 68.9 kg (151 lb 14.4 oz) 75.5 kg (166 lb 6.4 oz)   Macronutrient Needs:  Revised EER: 6524-7001 sunni/day (25-30 sunni/kg IBW d/t wide weight variations)   EPR: 101-117 gm pro/day (1.3-1.5 gm pro/kgIBW)   Intake/Comparative Standards: No intake data available. Unable to assess intake based on vague information stated by patient. Intervention:   Meals and Snacks: Add CCHO feature to current diet. Nutrition Supplement Therapy: Nutrition Support Orders/Electrolyte Management Replacement Protocols are active on the MAR. Add no sugar added honey thick shake tid. Discharge nutrition plan: Plan for transfer to [facility] in next few days.  Continue nutritional supplements based on po intake and need as per [facility], [facility].     Todd Carpenter, [address], [address] [place] [address] 4

## 2022-08-17 NOTE — ED PROVIDER NOTE - MUSCULOSKELETAL, MLM
No calf tenderness.  Spine appears normal, range of motion is not limited, no muscle or joint tenderness

## 2022-08-17 NOTE — ED PROVIDER NOTE - NSFOLLOWUPINSTRUCTIONS_ED_ALL_ED_FT
Return to ER immediately if your chest pain returns, if you have pain with radiation to arms, back or neck, if you feel short of breath, feel weak, feel fast or pounding heart beating, if you have any fever or vomiting.  If you need assistance with follow up appointments our Care Coordinator can be reached at [phone]. In addition the [facility] is located at [address] [place], [place] [address], tel: [phone].

## 2022-08-17 NOTE — ED PROVIDER NOTE - PATIENT PORTAL LINK FT
You can access the FollowMyHealth Patient Portal offered by [facility] by registering at the following website: [email or web address]. By joining [employer]’s FollowMyHealth portal, you will also be able to view your health information using other applications (apps) compatible with our system.

## 2022-08-17 NOTE — ED PROVIDER NOTE - CLINICAL SUMMARY MEDICAL DECISION MAKING FREE TEXT BOX
At 3pm patient remains well. High sensitivity troponin and D-dimer are negative. Patient is stable to transfer back to group home with staff member. At 3pm patient remains in no distress. High sensitivity troponin and D-dimer are negative. Patient is stable to transfer back to group home with staff member ([facility]).  HR 88 bpm.  Pt is well appearing, has no new complaints and able to walk with normal gait. Pt is stable for discharge and follow up with medical doctor. Pt educated on care and need for follow up. Discussed anticipatory guidance and return precautions. Questions answered. I had a detailed discussion with the patient and/or guardian regarding the historical points, exam findings, and any diagnostic results supporting the discharge diagnosis. At 3am patient remains in no distress. High sensitivity troponin and D-dimer are negative. Patient is stable to transfer back to group home with staff member ([facility]).  HR 88 bpm.  Pt is well appearing, has no new complaints and able to walk with normal gait. Pt is stable for discharge and follow up with medical doctor. Pt educated on care and need for follow up. Discussed anticipatory guidance and return precautions. Questions answered. I had a detailed discussion with the patient and/or guardian regarding the historical points, exam findings, and any diagnostic results supporting the discharge diagnosis.

## 2022-08-18 LAB
CULTURE RESULTS: SIGNIFICANT CHANGE UP
SPECIMEN SOURCE: SIGNIFICANT CHANGE UP

## 2022-08-18 NOTE — ED ADULT NURSE NOTE - EXTENSIONS OF SELF_ADULT
"Aditi"Libby Conway was seen and treated in our emergency department on 8/18/2022.     COVID-19 is present in our communities across the state. There is limited testing for COVID at this time, so not all patients can be tested. In this situation, your employee meets the following criteria:    Aditi Conway has met the criteria for COVID-19 testing and has a NEGATIVE result. The employee can return to work once they are asymptomatic for 24 hours without the use of fever reducing medications (Tylenol, Motrin, etc).     If the employee is not fully vaccinated and had a close contact:  · Retest at 5 to 7 days post-exposure  · If possible, it is recommended that they quarantine for 5 days from the time of contact regardless of their test status.  · A mask should be worn post quarantine for 5 days.    If you have any questions or concerns, or if I can be of further assistance, please do not hesitate to contact me.    Sincerely,             Onur Martinez, DO"
"Aditi"Libby Conway was seen and treated in our emergency department on 8/18/2022.     COVID-19 is present in our communities across the state. There is limited testing for COVID at this time, so not all patients can be tested. In this situation, your employee meets the following criteria:    Aditi Conway has met the criteria for COVID-19 testing and has a NEGATIVE result. The employee can return to work once they are asymptomatic for 24 hours without the use of fever reducing medications (Tylenol, Motrin, etc).     If the employee is not fully vaccinated and had a close contact:  · Retest at 5 to 7 days post-exposure  · If possible, it is recommended that they quarantine for 5 days from the time of contact regardless of their test status.  · A mask should be worn post quarantine for 5 days.    If you have any questions or concerns, or if I can be of further assistance, please do not hesitate to contact me.    Sincerely,             Onur Martinez, DO"
None

## 2022-09-19 ENCOUNTER — EMERGENCY (EMERGENCY)
Facility: HOSPITAL | Age: 32
LOS: 1 days | Discharge: HOME | End: 2022-09-19
Attending: STUDENT IN AN ORGANIZED HEALTH CARE EDUCATION/TRAINING PROGRAM
Payer: MEDICAID

## 2022-09-19 VITALS
HEIGHT: 71 IN | SYSTOLIC BLOOD PRESSURE: 111 MMHG | OXYGEN SATURATION: 97 % | WEIGHT: 303.8 LBS | TEMPERATURE: 99 F | DIASTOLIC BLOOD PRESSURE: 76 MMHG | RESPIRATION RATE: 18 BRPM | HEART RATE: 78 BPM

## 2022-09-19 PROCEDURE — 76870 US EXAM SCROTUM: CPT | Mod: 26

## 2022-09-19 PROCEDURE — 99284 EMERGENCY DEPT VISIT MOD MDM: CPT

## 2022-09-19 PROCEDURE — 99284 EMERGENCY DEPT VISIT MOD MDM: CPT | Mod: 25

## 2022-09-19 PROCEDURE — 76870 US EXAM SCROTUM: CPT

## 2022-09-19 NOTE — ED PROVIDER NOTE - CLINICAL SUMMARY MEDICAL DECISION MAKING FREE TEXT BOX
Patient presenting for testicular swelling x 2 weeks. will obtain ua, doppler, r.o torsion. ed obs and reassess

## 2022-09-19 NOTE — ED PROVIDER NOTE - NSFOLLOWUPCLINICS_GEN_ALL_ED_FT
[facility]  Urology  [address] [place] [address]  Phone: [phone]  Fax: [phone]

## 2022-09-19 NOTE — ED PROVIDER NOTE - NSFOLLOWUPINSTRUCTIONS_ED_ALL_ED_FT
Testicle Pain    WHAT YOU NEED TO KNOW:    Testicle pain may start in your scrotum and spread to your abdomen. You may have sharp, sudden pain or dull pain that happens over time. Your testicle pain may come and go, or it may last for a long time. The cause of your pain may be unknown. Testicle pain can be caused by infection, trauma, hernia, kidney stones, or sexually transmitted infections (STIs). You may have a painful lump in your scrotum. The lump may be caused by an enlarged vein or fluid that collects around one of your testicles. This lump also may be caused by a more serious medical condition. Part of your testicle may twist. This is a serious condition that needs treatment as soon as possible.    DISCHARGE INSTRUCTIONS:    Medicines:   •Antibiotics: This medicine helps fight or prevent infection. Take your antibiotics until they are gone, even if you feel better.      •Pain medicine: You may be given a prescription medicine to decrease pain. Do not wait until the pain is severe before you take this medicine.      •NSAIDs: These medicines decrease swelling, pain, and fever. NSAIDs are available without a doctor's order. Ask your healthcare provider which medicine is right for you. Ask how much to take and when to take it. Take as directed. NSAIDs can cause stomach bleeding and kidney problems if not taken correctly.      •Take your medicine as directed. Contact your healthcare provider if you think your medicine is not helping or if you have side effects. Tell your provider if you are allergic to any medicine. Keep a list of the medicines, vitamins, and herbs you take. Include the amounts, and when and why you take them. Bring the list or the pill bottles to follow-up visits. Carry your medicine list with you in case of an emergency.      Decrease discomfort: With treatment, your pain may improve within 1 to 3 days. Depending on the cause of your testicle pain, your condition may take up to 4 weeks to heal.   •Rest: Limit your activity until your pain decreases. Get more rest while you heal. Do not sit for long periods of time.       •Cold packs: Place cold packs on your testicles to help ease your pain. Use cold packs as directed.      •Elevation: Gently tuck a folded towel under your testicles to lift them as you sit in a chair or lie in bed. This will help ease your pain and decrease swelling.      Follow up with your healthcare provider or urologist in 3 to 7 days: Write down your questions so you remember to ask them during your visits.    Sexual activity: Avoid sexual activity until you have finished your antibiotics or until your healthcare provider tells you it is safe to have sex. Use condoms to lower your risk of STIs.    Contact your healthcare provider or urologist if:   •You feel that your medicine or treatment is not working.      •You feel more pain, tenderness, or swelling than before.      •You have nausea or a low fever.      •You have questions or concerns about your condition or care.      Return to the emergency department if:   •You have sudden or severe pain in your testicles or abdomen.      •You have pain in both testicles.      •You are vomiting.      •You have a high fever.      •Your pain increases when you elevate your testicles.      •Your scrotum turns blue. This could mean your testicle is not getting the blood flow it needs.

## 2022-09-19 NOTE — ED PROVIDER NOTE - PATIENT PORTAL LINK FT
You can access the FollowMyHealth Patient Portal offered by [facility] by registering at the following website: [email or web address]. By joining [employer]’s FollowMyHealth portal, you will also be able to view your health information using other applications (apps) compatible with our system.

## 2022-09-19 NOTE — ED PROVIDER NOTE - OBJECTIVE STATEMENT
32 y.o presenting with 2 weeks of left testicular swelling and pain. denies n, v, fever, cough, sob.

## 2022-09-19 NOTE — ED ADULT TRIAGE NOTE - DOMESTIC TRAVEL HIGH RISK QUESTION
What Type Of Note Output Would You Prefer (Optional)?: Standard Output Has Your Skin Lesion Been Treated?: not been treated Is This A New Presentation, Or A Follow-Up?: Skin Lesion No

## 2022-09-25 ENCOUNTER — INPATIENT (INPATIENT)
Facility: HOSPITAL | Age: 32
LOS: 2 days | Discharge: NURSING, REHAB OR LONG-TERM CARE | DRG: 711 | End: 2022-09-28
Attending: STUDENT IN AN ORGANIZED HEALTH CARE EDUCATION/TRAINING PROGRAM | Admitting: STUDENT IN AN ORGANIZED HEALTH CARE EDUCATION/TRAINING PROGRAM
Payer: MEDICAID

## 2022-09-25 VITALS
TEMPERATURE: 98 F | OXYGEN SATURATION: 98 % | WEIGHT: 300.93 LBS | DIASTOLIC BLOOD PRESSURE: 77 MMHG | HEIGHT: 71 IN | RESPIRATION RATE: 18 BRPM | HEART RATE: 81 BPM | SYSTOLIC BLOOD PRESSURE: 110 MMHG

## 2022-09-25 DIAGNOSIS — N40.0 BENIGN PROSTATIC HYPERPLASIA WITHOUT LOWER URINARY TRACT SYMPTOMS: ICD-10-CM

## 2022-09-25 DIAGNOSIS — K21.9 GASTRO-ESOPHAGEAL REFLUX DISEASE WITHOUT ESOPHAGITIS: ICD-10-CM

## 2022-09-25 DIAGNOSIS — N50.89 OTHER SPECIFIED DISORDERS OF THE MALE GENITAL ORGANS: ICD-10-CM

## 2022-09-25 DIAGNOSIS — E78.5 HYPERLIPIDEMIA, UNSPECIFIED: ICD-10-CM

## 2022-09-25 DIAGNOSIS — N50.812 LEFT TESTICULAR PAIN: ICD-10-CM

## 2022-09-25 DIAGNOSIS — E03.9 HYPOTHYROIDISM, UNSPECIFIED: ICD-10-CM

## 2022-09-25 DIAGNOSIS — R68.89 OTHER GENERAL SYMPTOMS AND SIGNS: ICD-10-CM

## 2022-09-25 DIAGNOSIS — E11.9 TYPE 2 DIABETES MELLITUS WITHOUT COMPLICATIONS: ICD-10-CM

## 2022-09-25 DIAGNOSIS — F39 UNSPECIFIED MOOD [AFFECTIVE] DISORDER: ICD-10-CM

## 2022-09-25 DIAGNOSIS — R19.7 DIARRHEA, UNSPECIFIED: ICD-10-CM

## 2022-09-25 DIAGNOSIS — R74.01 ELEVATION OF LEVELS OF LIVER TRANSAMINASE LEVELS: ICD-10-CM

## 2022-09-25 DIAGNOSIS — J45.909 UNSPECIFIED ASTHMA, UNCOMPLICATED: ICD-10-CM

## 2022-09-25 DIAGNOSIS — Z29.9 ENCOUNTER FOR PROPHYLACTIC MEASURES, UNSPECIFIED: ICD-10-CM

## 2022-09-25 LAB
AFP-TM SERPL-MCNC: 3.3 NG/ML — SIGNIFICANT CHANGE UP
ALBUMIN SERPL ELPH-MCNC: 3 G/DL — LOW (ref 3.5–5)
ALP SERPL-CCNC: 100 U/L — SIGNIFICANT CHANGE UP (ref 40–120)
ALT FLD-CCNC: 144 U/L DA — HIGH (ref 10–60)
ANION GAP SERPL CALC-SCNC: 5 MMOL/L — SIGNIFICANT CHANGE UP (ref 5–17)
APPEARANCE UR: CLEAR — SIGNIFICANT CHANGE UP
AST SERPL-CCNC: 61 U/L — HIGH (ref 10–40)
BASOPHILS # BLD AUTO: 0.03 K/UL — SIGNIFICANT CHANGE UP (ref 0–0.2)
BASOPHILS NFR BLD AUTO: 0.5 % — SIGNIFICANT CHANGE UP (ref 0–2)
BILIRUB SERPL-MCNC: 0.3 MG/DL — SIGNIFICANT CHANGE UP (ref 0.2–1.2)
BILIRUB UR-MCNC: NEGATIVE — SIGNIFICANT CHANGE UP
BUN SERPL-MCNC: 18 MG/DL — SIGNIFICANT CHANGE UP (ref 7–18)
CALCIUM SERPL-MCNC: 8.6 MG/DL — SIGNIFICANT CHANGE UP (ref 8.4–10.5)
CHLORIDE SERPL-SCNC: 108 MMOL/L — SIGNIFICANT CHANGE UP (ref 96–108)
CO2 SERPL-SCNC: 24 MMOL/L — SIGNIFICANT CHANGE UP (ref 22–31)
COLOR SPEC: YELLOW — SIGNIFICANT CHANGE UP
CREAT SERPL-MCNC: 0.92 MG/DL — SIGNIFICANT CHANGE UP (ref 0.5–1.3)
DIFF PNL FLD: NEGATIVE — SIGNIFICANT CHANGE UP
EGFR: 113 ML/MIN/1.73M2 — SIGNIFICANT CHANGE UP
EOSINOPHIL # BLD AUTO: 0.21 K/UL — SIGNIFICANT CHANGE UP (ref 0–0.5)
EOSINOPHIL NFR BLD AUTO: 3.5 % — SIGNIFICANT CHANGE UP (ref 0–6)
GLUCOSE BLDC GLUCOMTR-MCNC: 100 MG/DL — HIGH (ref 70–99)
GLUCOSE BLDC GLUCOMTR-MCNC: 102 MG/DL — HIGH (ref 70–99)
GLUCOSE BLDC GLUCOMTR-MCNC: 120 MG/DL — HIGH (ref 70–99)
GLUCOSE BLDC GLUCOMTR-MCNC: 130 MG/DL — HIGH (ref 70–99)
GLUCOSE SERPL-MCNC: 113 MG/DL — HIGH (ref 70–99)
GLUCOSE UR QL: NEGATIVE — SIGNIFICANT CHANGE UP
HCT VFR BLD CALC: 42.8 % — SIGNIFICANT CHANGE UP (ref 39–50)
HGB BLD-MCNC: 13.7 G/DL — SIGNIFICANT CHANGE UP (ref 13–17)
IMM GRANULOCYTES NFR BLD AUTO: 0.8 % — SIGNIFICANT CHANGE UP (ref 0–0.9)
KETONES UR-MCNC: ABNORMAL
LDH SERPL L TO P-CCNC: 308 U/L — HIGH (ref 120–225)
LEUKOCYTE ESTERASE UR-ACNC: NEGATIVE — SIGNIFICANT CHANGE UP
LYMPHOCYTES # BLD AUTO: 2.15 K/UL — SIGNIFICANT CHANGE UP (ref 1–3.3)
LYMPHOCYTES # BLD AUTO: 36.2 % — SIGNIFICANT CHANGE UP (ref 13–44)
MCHC RBC-ENTMCNC: 25.5 PG — LOW (ref 27–34)
MCHC RBC-ENTMCNC: 32 GM/DL — SIGNIFICANT CHANGE UP (ref 32–36)
MCV RBC AUTO: 79.7 FL — LOW (ref 80–100)
MONOCYTES # BLD AUTO: 0.64 K/UL — SIGNIFICANT CHANGE UP (ref 0–0.9)
MONOCYTES NFR BLD AUTO: 10.8 % — SIGNIFICANT CHANGE UP (ref 2–14)
NEUTROPHILS # BLD AUTO: 2.86 K/UL — SIGNIFICANT CHANGE UP (ref 1.8–7.4)
NEUTROPHILS NFR BLD AUTO: 48.2 % — SIGNIFICANT CHANGE UP (ref 43–77)
NITRITE UR-MCNC: NEGATIVE — SIGNIFICANT CHANGE UP
NRBC # BLD: 0 /100 WBCS — SIGNIFICANT CHANGE UP (ref 0–0)
PH UR: 5 — SIGNIFICANT CHANGE UP (ref 5–8)
PLATELET # BLD AUTO: 177 K/UL — SIGNIFICANT CHANGE UP (ref 150–400)
POTASSIUM SERPL-MCNC: 4.5 MMOL/L — SIGNIFICANT CHANGE UP (ref 3.5–5.3)
POTASSIUM SERPL-SCNC: 4.5 MMOL/L — SIGNIFICANT CHANGE UP (ref 3.5–5.3)
PROT SERPL-MCNC: 6.6 G/DL — SIGNIFICANT CHANGE UP (ref 6–8.3)
PROT UR-MCNC: NEGATIVE — SIGNIFICANT CHANGE UP
RBC # BLD: 5.37 M/UL — SIGNIFICANT CHANGE UP (ref 4.2–5.8)
RBC # FLD: 14.3 % — SIGNIFICANT CHANGE UP (ref 10.3–14.5)
SARS-COV-2 RNA SPEC QL NAA+PROBE: SIGNIFICANT CHANGE UP
SODIUM SERPL-SCNC: 137 MMOL/L — SIGNIFICANT CHANGE UP (ref 135–145)
SP GR SPEC: 1.02 — SIGNIFICANT CHANGE UP (ref 1.01–1.02)
UROBILINOGEN FLD QL: NEGATIVE — SIGNIFICANT CHANGE UP
WBC # BLD: 5.94 K/UL — SIGNIFICANT CHANGE UP (ref 3.8–10.5)
WBC # FLD AUTO: 5.94 K/UL — SIGNIFICANT CHANGE UP (ref 3.8–10.5)

## 2022-09-25 PROCEDURE — 99223 1ST HOSP IP/OBS HIGH 75: CPT

## 2022-09-25 PROCEDURE — 76870 US EXAM SCROTUM: CPT | Mod: 26

## 2022-09-25 PROCEDURE — 74177 CT ABD & PELVIS W/CONTRAST: CPT | Mod: 26

## 2022-09-25 PROCEDURE — 99285 EMERGENCY DEPT VISIT HI MDM: CPT

## 2022-09-25 RX ORDER — FERROUS SULFATE 325(65) MG
325 TABLET ORAL DAILY
Refills: 0 | Status: DISCONTINUED | OUTPATIENT
Start: 2022-09-25 | End: 2022-09-27

## 2022-09-25 RX ORDER — SODIUM CHLORIDE 9 MG/ML
1000 INJECTION INTRAMUSCULAR; INTRAVENOUS; SUBCUTANEOUS
Refills: 0 | Status: COMPLETED | OUTPATIENT
Start: 2022-09-25 | End: 2022-09-25

## 2022-09-25 RX ORDER — DIVALPROEX SODIUM 500 MG/1
2 TABLET, DELAYED RELEASE ORAL
Qty: 0 | Refills: 0 | DISCHARGE

## 2022-09-25 RX ORDER — ATORVASTATIN CALCIUM 80 MG/1
10 TABLET, FILM COATED ORAL AT BEDTIME
Refills: 0 | Status: DISCONTINUED | OUTPATIENT
Start: 2022-09-25 | End: 2022-09-27

## 2022-09-25 RX ORDER — TAMSULOSIN HYDROCHLORIDE 0.4 MG/1
0.4 CAPSULE ORAL AT BEDTIME
Refills: 0 | Status: DISCONTINUED | OUTPATIENT
Start: 2022-09-25 | End: 2022-09-27

## 2022-09-25 RX ORDER — LEVOTHYROXINE SODIUM 125 MCG
50 TABLET ORAL DAILY
Refills: 0 | Status: DISCONTINUED | OUTPATIENT
Start: 2022-09-25 | End: 2022-09-27

## 2022-09-25 RX ORDER — INSULIN LISPRO 100/ML
VIAL (ML) SUBCUTANEOUS
Refills: 0 | Status: DISCONTINUED | OUTPATIENT
Start: 2022-09-25 | End: 2022-09-27

## 2022-09-25 RX ORDER — PANTOPRAZOLE SODIUM 20 MG/1
40 TABLET, DELAYED RELEASE ORAL
Refills: 0 | Status: DISCONTINUED | OUTPATIENT
Start: 2022-09-25 | End: 2022-09-27

## 2022-09-25 RX ORDER — MIRTAZAPINE 45 MG/1
15 TABLET, ORALLY DISINTEGRATING ORAL AT BEDTIME
Refills: 0 | Status: DISCONTINUED | OUTPATIENT
Start: 2022-09-25 | End: 2022-09-27

## 2022-09-25 RX ORDER — ACETAMINOPHEN 500 MG
650 TABLET ORAL EVERY 6 HOURS
Refills: 0 | Status: DISCONTINUED | OUTPATIENT
Start: 2022-09-25 | End: 2022-09-27

## 2022-09-25 RX ORDER — SODIUM CHLORIDE 9 MG/ML
1000 INJECTION INTRAMUSCULAR; INTRAVENOUS; SUBCUTANEOUS ONCE
Refills: 0 | Status: COMPLETED | OUTPATIENT
Start: 2022-09-25 | End: 2022-09-25

## 2022-09-25 RX ORDER — HEPARIN SODIUM 5000 [USP'U]/ML
5000 INJECTION INTRAVENOUS; SUBCUTANEOUS EVERY 12 HOURS
Refills: 0 | Status: DISCONTINUED | OUTPATIENT
Start: 2022-09-25 | End: 2022-09-27

## 2022-09-25 RX ORDER — LEVOTHYROXINE SODIUM 125 MCG
50 TABLET ORAL DAILY
Refills: 0 | Status: DISCONTINUED | OUTPATIENT
Start: 2022-09-25 | End: 2022-09-25

## 2022-09-25 RX ORDER — DIVALPROEX SODIUM 500 MG/1
500 TABLET, DELAYED RELEASE ORAL
Refills: 0 | Status: DISCONTINUED | OUTPATIENT
Start: 2022-09-25 | End: 2022-09-27

## 2022-09-25 RX ORDER — RISPERIDONE 4 MG/1
4 TABLET ORAL
Refills: 0 | Status: DISCONTINUED | OUTPATIENT
Start: 2022-09-25 | End: 2022-09-27

## 2022-09-25 RX ORDER — DIVALPROEX SODIUM 500 MG/1
1 TABLET, DELAYED RELEASE ORAL
Qty: 0 | Refills: 0 | DISCHARGE

## 2022-09-25 RX ORDER — KETOROLAC TROMETHAMINE 30 MG/ML
15 SYRINGE (ML) INJECTION ONCE
Refills: 0 | Status: DISCONTINUED | OUTPATIENT
Start: 2022-09-25 | End: 2022-09-25

## 2022-09-25 RX ORDER — INSULIN LISPRO 100/ML
VIAL (ML) SUBCUTANEOUS AT BEDTIME
Refills: 0 | Status: DISCONTINUED | OUTPATIENT
Start: 2022-09-25 | End: 2022-09-27

## 2022-09-25 RX ORDER — ALBUTEROL 90 UG/1
2 AEROSOL, METERED ORAL EVERY 6 HOURS
Refills: 0 | Status: DISCONTINUED | OUTPATIENT
Start: 2022-09-25 | End: 2022-09-27

## 2022-09-25 RX ORDER — ONDANSETRON 8 MG/1
4 TABLET, FILM COATED ORAL EVERY 8 HOURS
Refills: 0 | Status: DISCONTINUED | OUTPATIENT
Start: 2022-09-25 | End: 2022-09-27

## 2022-09-25 RX ORDER — LANOLIN ALCOHOL/MO/W.PET/CERES
3 CREAM (GRAM) TOPICAL AT BEDTIME
Refills: 0 | Status: DISCONTINUED | OUTPATIENT
Start: 2022-09-25 | End: 2022-09-27

## 2022-09-25 RX ORDER — MORPHINE SULFATE 50 MG/1
2 CAPSULE, EXTENDED RELEASE ORAL ONCE
Refills: 0 | Status: DISCONTINUED | OUTPATIENT
Start: 2022-09-25 | End: 2022-09-25

## 2022-09-25 RX ORDER — PANTOPRAZOLE SODIUM 20 MG/1
1 TABLET, DELAYED RELEASE ORAL
Qty: 0 | Refills: 0 | DISCHARGE

## 2022-09-25 RX ADMIN — Medication 50 MICROGRAM(S): at 06:53

## 2022-09-25 RX ADMIN — Medication 15 MILLIGRAM(S): at 02:07

## 2022-09-25 RX ADMIN — SODIUM CHLORIDE 1000 MILLILITER(S): 9 INJECTION INTRAMUSCULAR; INTRAVENOUS; SUBCUTANEOUS at 04:03

## 2022-09-25 RX ADMIN — Medication 1 MILLIGRAM(S): at 21:08

## 2022-09-25 RX ADMIN — Medication 15 MILLIGRAM(S): at 01:38

## 2022-09-25 RX ADMIN — DIVALPROEX SODIUM 500 MILLIGRAM(S): 500 TABLET, DELAYED RELEASE ORAL at 17:01

## 2022-09-25 RX ADMIN — RISPERIDONE 4 MILLIGRAM(S): 4 TABLET ORAL at 17:01

## 2022-09-25 RX ADMIN — MORPHINE SULFATE 2 MILLIGRAM(S): 50 CAPSULE, EXTENDED RELEASE ORAL at 04:03

## 2022-09-25 RX ADMIN — Medication 325 MILLIGRAM(S): at 12:24

## 2022-09-25 RX ADMIN — MIRTAZAPINE 15 MILLIGRAM(S): 45 TABLET, ORALLY DISINTEGRATING ORAL at 21:08

## 2022-09-25 RX ADMIN — TAMSULOSIN HYDROCHLORIDE 0.4 MILLIGRAM(S): 0.4 CAPSULE ORAL at 21:08

## 2022-09-25 RX ADMIN — HEPARIN SODIUM 5000 UNIT(S): 5000 INJECTION INTRAVENOUS; SUBCUTANEOUS at 17:00

## 2022-09-25 RX ADMIN — SODIUM CHLORIDE 90 MILLILITER(S): 9 INJECTION INTRAMUSCULAR; INTRAVENOUS; SUBCUTANEOUS at 06:54

## 2022-09-25 RX ADMIN — ATORVASTATIN CALCIUM 10 MILLIGRAM(S): 80 TABLET, FILM COATED ORAL at 21:08

## 2022-09-25 RX ADMIN — Medication 15 MILLIGRAM(S): at 17:00

## 2022-09-25 RX ADMIN — PANTOPRAZOLE SODIUM 40 MILLIGRAM(S): 20 TABLET, DELAYED RELEASE ORAL at 08:11

## 2022-09-25 RX ADMIN — Medication 3 MILLIGRAM(S): at 21:08

## 2022-09-25 RX ADMIN — MORPHINE SULFATE 2 MILLIGRAM(S): 50 CAPSULE, EXTENDED RELEASE ORAL at 04:33

## 2022-09-25 NOTE — H&P ADULT - HISTORY OF PRESENT ILLNESS
Patient is a 32M, from [facility], with PMHx of Autism Spectrum Disorder, Asthma, HLD, GERD, Hypothyroid, Impulse control disorder, Sun sensitivity, and BPH, who comes in with 2 week history of increasing L testicular swelling and pain. He said pain gets worse whenever he strains to defecate. Also, whenever he walks while having the testicular pain, he gets lightheaded. Lastly, he has been having diarrhea a few times per day.  -He had dysuria, was diagnosed with UTI, and just finished 7 days of Cefalexin 500mg q6hrs.  -He was in the ED 5 days ago, was found to have suspicion for testicular cancer through US, and was recommended to follow up outpatient urology. However, patient came in today due to severe pain.

## 2022-09-25 NOTE — H&P ADULT - ASSESSMENT
Patient is a 32M, [facility], with PMHx of Autism Spectrum Disorder, Asthma, HLD, GERD, Hypothyroid, Impulse control disorder, Sun sensitivity, and BPH, who comes in with 2 week history of increasing L testicular swelling and pain. Admitted for testicular pain and swelling.

## 2022-09-25 NOTE — H&P ADULT - PROBLEM SELECTOR PLAN 5
- C/w home Tamsulosin - C/w home Buspirone, Valproic acid, Mirtazapine, Risperidone, Ativan  - Home Guanfacine 2mg daily will have to be ordered as freetext

## 2022-09-25 NOTE — H&P ADULT - NSHPREVIEWOFSYSTEMS_GEN_ALL_CORE
CONSTITUTIONAL: No fever, weight loss, or fatigue  EYES: No eye pain, visual disturbances, or discharge  ENT:  No difficulty hearing, tinnitus, vertigo; No sinus or throat pain  NECK: No pain or stiffness  RESPIRATORY: No cough, wheezing, chills or hemoptysis; No Shortness of Breath  CARDIOVASCULAR: No chest pain, palpitations, passing out, dizziness, or leg swelling  GASTROINTESTINAL: No abdominal or epigastric pain. No nausea, vomiting, or hematemesis; (+)Diarrhea. No constipation. No melena or hematochezia.  GENITOURINARY: No dysuria, frequency, hematuria, or incontinence. (+)L Testicular pain, swelling  NEUROLOGICAL: No headaches, memory loss, loss of strength, numbness, or tremors  SKIN: No itching, burning, rashes, or lesions   LYMPH Nodes: No enlarged glands  ENDOCRINE: No heat or cold intolerance; No hair loss  MUSCULOSKELETAL: No joint pain or swelling; No muscle, back, No extremity pain  PSYCHIATRIC: No depression, anxiety, mood swings, or difficulty sleeping  HEME/LYMPH: No easy bruising, or bleeding gums  ALLERGY AND IMMUNOLOGIC: No hives or eczema

## 2022-09-25 NOTE — PATIENT PROFILE ADULT - FUNCTIONAL ASSESSMENT - DAILY ACTIVITY SCORE.
21

## 2022-09-25 NOTE — H&P ADULT - PROBLEM SELECTOR PLAN 4
- C/w home Buspirone, Valproic acid, Mirtazapine, Risperidone, Ativan  - Home Guanfacine 2mg daily will have to be ordered as freetext - P/w AST 61;   - No abdominal tenderness  - Very slowly trending up in the past 2 months  - Continue to monitor

## 2022-09-25 NOTE — ED PROVIDER NOTE - CLINICAL SUMMARY MEDICAL DECISION MAKING FREE TEXT BOX
Ultrasound concerning for testicular malignancy patient in pain would like to be admitted.  Consulted surgery who recommends CT abdomen and pelvis to evaluate stage of cancer admission to work-up cancer to medical team.

## 2022-09-25 NOTE — CONSULT NOTE ADULT - ASSESSMENT
FULL NOTE TO COME  31 y/o male with PMHx of asthma, HLD, GERD, Hypothyroid, Mood disorder, Autism, BPH presents to the ED with testicular pain concerning for malignancy.  Testicular pain which started few weeks ago is mostly on the left , sharp and worse with movement.  He was seen here 5 days ago diagnosed with probable testicular cancer discharge for  follow-up for diagnostic work-up.     CT abd/pel with IV contrast to delineate anatomy/mass  Pain/nausea control   Obtain Tumor markers- AFP, HCG, LDH  Other care/mgmt per primary team   Urology will follow

## 2022-09-25 NOTE — H&P ADULT - ATTENDING COMMENTS
Pt seen and examined  Case discussed with MAR.  32 year old man - [facility] resident with hx of Autism with PMH of BPH, HLD, hypothyroidism and psych problems including PTSD, HAVEN, ADHD who was brought in on account of a left testicular swelling and pain. He is limited with his history - HE was here with the swelling a few days ago. He complains of pain intermittently when it rubs on the skin. No fever, chills, no penile discharge or swelling outside of the testis/scrotum.    Vital Signs Last 24 Hrs  T(C): 36.7 (25 Sep 2022 00:31), Max: 36.7 (25 Sep 2022 00:31)  T(F): 98 (25 Sep 2022 00:31), Max: 98 (25 Sep 2022 00:31)  HR: 81 (25 Sep 2022 00:31) (81 - 81)  BP: 110/77 (25 Sep 2022 00:31) (110/77 - 110/77)  RR: 18 (25 Sep 2022 00:31) (18 - 18)  SpO2: 98% (25 Sep 2022 00:31) (98% - 98%)  Parameters below as of 25 Sep 2022 00:31  Patient On (Oxygen Delivery Method): room air    Young man, obese, NAD AAO X 3  Full abdomen, NT ND BS +  pelvic area with no palpable lymph nodes  Left testis   palpably enlarged; much larger than the right testis  Tender to palpation, well circumscribed  with no irregular edges or surrounding mass  No significant erythema, no palpably enlarged epididymus     Labs                         13.7   5.94  )-----------( 177      ( 25 Sep 2022 04:01 )             42.8     09-25    137  |  108  |  18  ----------------------------<  113<H>  4.5   |  24  |  0.92    Ca    8.6      25 Sep 2022 04:01    TPro  6.6  /  Alb  3.0<L>  /  TBili  0.3  /  DBili  x   /  AST  61<H>  /  ALT  144<H>  /  AlkPhos  100  09-25    RIGHT:  Right testis: 3.9 cm x 2.0 cm x  3.0 cm. Normal echogenicity and   echotexture with no masses or areas of architectural distortion. Normal   arterial and venous blood flow pattern.  Right epididymis: Within normal limits.  Right hydrocele: Trace  Right varicocele: None.    LEFT:  Left testis: 5.1 cm x 3.1 cm x 4.5 cm. Grossly unchanged hypoechoic   heterogeneous and hypervascular masses seen within the left testicle   measuring 3 3.9 x 2.8 x 3 3.6 cm. Normal arterial and venous blood flow   pattern in the surrounding testicular parenchyma.  Left epididymis: Within normal limits.  Left hydrocele: Trace  Left varicocele: None.    UA - unremarkable     A/P   1) Large left intratesticular mass  Pt admitted for Urology service with planned work up  tumor markers ordered - bhcg, AFP   Imaging studies   Resume home meds  Further plans per urology team. Pt seen and examined  Case discussed with MAR.  32 year old man - a [facility] resident with hx of Autism with PMH of BPH, HLD, hypothyroidism and psych problems including PTSD, HAVEN, ADHD who was brought in on account of a left testicular swelling and pain. He is limited with his history - HE was here with the swelling a few days ago. He complains of pain intermittently when it rubs on the skin. No fever, chills, no penile discharge or swelling outside of the testis/scrotum.    Vital Signs Last 24 Hrs  T(C): 36.7 (25 Sep 2022 00:31), Max: 36.7 (25 Sep 2022 00:31)  T(F): 98 (25 Sep 2022 00:31), Max: 98 (25 Sep 2022 00:31)  HR: 81 (25 Sep 2022 00:31) (81 - 81)  BP: 110/77 (25 Sep 2022 00:31) (110/77 - 110/77)  RR: 18 (25 Sep 2022 00:31) (18 - 18)  SpO2: 98% (25 Sep 2022 00:31) (98% - 98%)  Parameters below as of 25 Sep 2022 00:31  Patient On (Oxygen Delivery Method): room air    Young man, obese, NAD AAO X 3  Full abdomen, NT ND BS +  pelvic area with no palpable lymph nodes  Left testis   palpably enlarged; much larger than the right testis  Tender to palpation, well circumscribed  with no irregular edges or surrounding mass  No significant erythema, no palpably enlarged epididymus     Labs                         13.7   5.94  )-----------( 177      ( 25 Sep 2022 04:01 )             42.8     09-25    137  |  108  |  18  ----------------------------<  113<H>  4.5   |  24  |  0.92    Ca    8.6      25 Sep 2022 04:01    TPro  6.6  /  Alb  3.0<L>  /  TBili  0.3  /  DBili  x   /  AST  61<H>  /  ALT  144<H>  /  AlkPhos  100  09-25    RIGHT:  Right testis: 3.9 cm x 2.0 cm x  3.0 cm. Normal echogenicity and   echotexture with no masses or areas of architectural distortion. Normal   arterial and venous blood flow pattern.  Right epididymis: Within normal limits.  Right hydrocele: Trace  Right varicocele: None.    LEFT:  Left testis: 5.1 cm x 3.1 cm x 4.5 cm. Grossly unchanged hypoechoic   heterogeneous and hypervascular masses seen within the left testicle   measuring 3 3.9 x 2.8 x 3 3.6 cm. Normal arterial and venous blood flow   pattern in the surrounding testicular parenchyma.  Left epididymis: Within normal limits.  Left hydrocele: Trace  Left varicocele: None.    UA - unremarkable     A/P   1) Large left intratesticular mass  Pt admitted for Urology service with planned work up  tumor markers ordered - beta hcg, AFP   Imaging studies   Resume home meds  Further plans per urology team.

## 2022-09-25 NOTE — ED PROVIDER NOTE - OBJECTIVE STATEMENT
32-year-old autistic male with testicular pain concerning for malignancy.  Patient developed testicular pain a few weeks ago he was seen here 5 days ago diagnosed with probable testicular cancer discharge for  follow-up for diagnostic work-up..  However patient states the pain is severe and would like to be admitted.  He denies fevers chills dysuria hematuria weight loss night sweats

## 2022-09-25 NOTE — H&P ADULT - NSHPPHYSICALEXAM_GEN_ALL_CORE
GENERAL: NAD, well-groomed, well-developed  HEAD:  Atraumatic, Normocephalic  EYES: EOMI, PERRLA, conjunctiva and sclera clear  ENMT: No tonsillar erythema, exudates, or enlargement; Moist mucous membranes, Good dentition, No lesions  NECK: Supple, normal appearance, No JVD; Normal thyroid; Trachea midline  NERVOUS SYSTEM:  Alert & Oriented X3, Motor Strength 5/5 B/L upper and lower extremities, sensation intact  CHEST/LUNG: Lungs clear to auscultation bilaterally, No rales, rhonchi, wheezing   HEART: Regular rate and rhythm; No murmurs, rubs, or gallops  ABDOMEN: Soft, Nontender, Nondistended; Bowel sounds present  : L testicle warm, tender, erythematous, swollen without visible ulcer or pus  EXTREMITIES:  2+ Peripheral Pulses, No clubbing, cyanosis, or edema  LYMPH: No lymphadenopathy noted  SKIN: No rashes or lesions;  Good capillary refill

## 2022-09-25 NOTE — PATIENT PROFILE ADULT - CONTRAINDICATIONS & PRECAUTIONS (SELECT ALL THAT APPLY)
Symptomatic with suspected or confirmed COVID-19. Defer administration of Influenza Vaccine at this time Patient/surrogate refused vaccine...

## 2022-09-25 NOTE — CONSULT NOTE ADULT - SUBJECTIVE AND OBJECTIVE BOX
UROLOGY CONSULT NOTE    chief complaint of swollen testes    HPI:   31 y/o male with PMHx of asthma, HLD, GERD, Hypothyroid, Mood disorder, Autism, BPH is presenting to the ED with left swollen testicles for more than a week with pain testicular pain, left testicular swelling    PAST MEDICAL & SURGICAL HISTORY:  Mood disorder  Intellectual disability  Asthma  Obesity  GERD (gastroesophageal reflux disease)  Asthma  GERD (gastroesophageal reflux disease)  Factitious disorder  Urinary retention  Enuresis  Myopia of both eyes  Autism  Hypothyroidism  BPH  Dyslipidemia  HLD (hyperlipidemia)      No significant past surgical history      No significant past surgical history          MEDICATIONS  (STANDING):    MEDICATIONS  (PRN):      Allergies    Bee stings (Unknown)  Haldol (Other)  Haldol (Rash)  Zyprexa (Dystonic RXN)  Zyprexa (Unknown)    Intolerances        Social History:      FAMILY HISTORY:  FH: diabetes mellitus (Mother)        Vital Signs Last 24 Hrs  T(C): 36.7 (25 Sep 2022 00:31), Max: 36.7 (25 Sep 2022 00:31)  T(F): 98 (25 Sep 2022 00:31), Max: 98 (25 Sep 2022 00:31)  HR: 81 (25 Sep 2022 00:31) (81 - 81)  BP: 110/77 (25 Sep 2022 00:31) (110/77 - 110/77)  BP(mean): --  RR: 18 (25 Sep 2022 00:31) (18 - 18)  SpO2: 98% (25 Sep 2022 00:31) (98% - 98%)    Parameters below as of 25 Sep 2022 00:31  Patient On (Oxygen Delivery Method): room air      General:  A&Ox3,Appears stated age, No acute distress,  Head: NC/AT  EENT: PERRLA. EOMI. Conjunctiva and sclera clear. Pharynx clear.  Neck: Supple. No JVD  Lungs: CTA B/l. Nonlabored Respirations  CV: +S1S2, RRR  Abdomen: Soft, Nondistended,  Nontender, no guarding, no rebound    Extremities: Warm and well perfused. 2+ peripheral pulses b/l. Calf soft, nontender b/l. No pedal edema.        LABS:                        13.7   5.94  )-----------( 177      ( 25 Sep 2022 04:01 )             42.8     09-25    137  |  108  |  18  ----------------------------<  113<H>  4.5   |  24  |  0.92    Ca    8.6      25 Sep 2022 04:01    TPro  6.6  /  Alb  3.0<L>  /  TBili  0.3  /  DBili  x   /  AST  61<H>  /  ALT  144<H>  /  AlkPhos  100  09-25    LIVER FUNCTIONS - ( 25 Sep 2022 04:01 )  Alb: 3.0 g/dL / Pro: 6.6 g/dL / ALK PHOS: 100 U/L / ALT: 144 U/L DA / AST: 61 U/L / GGT: x                 RADIOLOGY & ADDITIONAL STUDIES:  < from: US Testicles (09.25.22 @ 03:01) >  FINDINGS:    RIGHT:  Right testis: 3.9 cm x 2.0 cm x  3.0 cm. Normal echogenicity and   echotexture with no masses or areas of architectural distortion. Normal   arterial and venous blood flow pattern.  Right epididymis: Within normal limits.  Right hydrocele: Trace  Right varicocele: None.    LEFT:  Left testis: 5.1 cm x 3.1 cm x 4.5 cm. Grossly unchanged hypoechoic   heterogeneous and hypervascular masses seen within the left testicle   measuring 3 3.9 x 2.8 x 3 3.6 cm. Normal arterial and venous blood flow   pattern in the surrounding testicular parenchyma.  Left epididymis: Within normal limits.  Left hydrocele: Trace  Left varicocele: None.    IMPRESSION:  No evidence of testicular torsion at time ofexam.    Hypoechoic heterogeneous hypervascular left intratesticular mass not   significantly changed and concerning for malignancy. Follow-up with   urology is recommended.      < end of copied text >     UROLOGY CONSULT NOTE    chief complaint of swollen testes    HPI:   31 y/o male with PMHx of asthma, HLD, GERD, Hypothyroid, Mood disorder, Autism, BPH presents to the ED with testicular pain concerning for malignancy.  Testicular pain which started few weeks ago is mostly on the left , sharp and worse with movement.  He was seen here 5 days ago diagnosed with probable testicular cancer discharge for  follow-up for diagnostic work-up. However patient states pain is now severe and would like to be admitted.  Denies fevers, chills dysuria hematuria, weight loss , night sweats or any other complaints at this time.     PAST MEDICAL & SURGICAL HISTORY:  Mood disorder  Intellectual disability  Asthma  Obesity  GERD (gastroesophageal reflux disease)  Asthma  GERD (gastroesophageal reflux disease)  Factitious disorder  Urinary retention  Enuresis  Myopia of both eyes  Autism  Hypothyroidism  BPH  Dyslipidemia  HLD (hyperlipidemia)              MEDICATIONS  (STANDING):    MEDICATIONS  (PRN):      Allergies    Bee stings (Unknown)  Haldol (Other)  Haldol (Rash)  Zyprexa (Dystonic RXN)  Zyprexa (Unknown)    Intolerances        Social History:      FAMILY HISTORY:  FH: diabetes mellitus (Mother)        Vital Signs Last 24 Hrs  T(C): 36.7 (25 Sep 2022 00:31), Max: 36.7 (25 Sep 2022 00:31)  T(F): 98 (25 Sep 2022 00:31), Max: 98 (25 Sep 2022 00:31)  HR: 81 (25 Sep 2022 00:31) (81 - 81)  BP: 110/77 (25 Sep 2022 00:31) (110/77 - 110/77)  BP(mean): --  RR: 18 (25 Sep 2022 00:31) (18 - 18)  SpO2: 98% (25 Sep 2022 00:31) (98% - 98%)    Parameters below as of 25 Sep 2022 00:31  Patient On (Oxygen Delivery Method): room air      General:  A&Ox3,Appears stated age, No acute distress,  Head: NC/AT  EENT: PERRLA. EOMI. Conjunctiva and sclera clear. Pharynx clear.  Neck: Supple. No JVD  Lungs: CTA B/l. Nonlabored Respirations  CV: +S1S2, RRR  Abdomen: Soft, Nondistended,  Nontender, no guarding, no rebound   :  Extremities: Warm and well perfused. 2+ peripheral pulses b/l. Calf soft, nontender b/l. No pedal edema.        LABS:                        13.7   5.94  )-----------( 177      ( 25 Sep 2022 04:01 )             42.8     09-25    137  |  108  |  18  ----------------------------<  113<H>  4.5   |  24  |  0.92    Ca    8.6      25 Sep 2022 04:01    TPro  6.6  /  Alb  3.0<L>  /  TBili  0.3  /  DBili  x   /  AST  61<H>  /  ALT  144<H>  /  AlkPhos  100  09-25    LIVER FUNCTIONS - ( 25 Sep 2022 04:01 )  Alb: 3.0 g/dL / Pro: 6.6 g/dL / ALK PHOS: 100 U/L / ALT: 144 U/L DA / AST: 61 U/L / GGT: x                 RADIOLOGY & ADDITIONAL STUDIES:  < from: US Testicles (09.25.22 @ 03:01) >  FINDINGS:    RIGHT:  Right testis: 3.9 cm x 2.0 cm x  3.0 cm. Normal echogenicity and   echotexture with no masses or areas of architectural distortion. Normal   arterial and venous blood flow pattern.  Right epididymis: Within normal limits.  Right hydrocele: Trace  Right varicocele: None.    LEFT:  Left testis: 5.1 cm x 3.1 cm x 4.5 cm. Grossly unchanged hypoechoic   heterogeneous and hypervascular masses seen within the left testicle   measuring 3 3.9 x 2.8 x 3 3.6 cm. Normal arterial and venous blood flow   pattern in the surrounding testicular parenchyma.  Left epididymis: Within normal limits.  Left hydrocele: Trace  Left varicocele: None.    IMPRESSION:  No evidence of testicular torsion at time ofexam.    Hypoechoic heterogeneous hypervascular left intratesticular mass not   significantly changed and concerning for malignancy. Follow-up with   urology is recommended.      < end of copied text >

## 2022-09-25 NOTE — H&P ADULT - PROBLEM SELECTOR PLAN 3
- P/w AST 61;   - No abdominal tenderness  - Very slowly trending up in the past 2 months  - F/u hep panel - P/w AST 61;   - No abdominal tenderness  - Very slowly trending up in the past 2 months  - Continue to monitor - P/w few days of diarrhea few times per day  - Lightheadedness may be due to diarrhea  - WBC 5.94  - May be side effect of Cephalexin  - Discontinued home Senna for now  - Continue to monitor

## 2022-09-25 NOTE — H&P ADULT - PROBLEM SELECTOR PLAN 2
- P/w few days of diarrhea few times per day  - Lightheadedness may be due to diarrhea  - WBC 5.94  - May be side effect of Cephalexin  - Discontinued home Senna for now  - Continue to monitor - P/w 2 week history of increasing L testicular swelling and pain  - L testicle warm, tender, erythematous, swollen without visible ulcer or pus  - Just finished 7 days of Cephalexin for UTI  - UA negative  - US testes = No evidence of torsion. Hypoechoic heterogeneous hypervascular left intratesticular mass not significantly changed and concerning for malignancy  - Urology consulted = F/u CT abd/pel with IV contrast to delineate anatomy/mass. F/u AFP, HCG, LDH

## 2022-09-25 NOTE — H&P ADULT - PROBLEM SELECTOR PLAN 1
- P/w 2 week history of increasing L testicular swelling and pain  - L testicle warm, tender, erythematous, swollen without visible ulcer or pus  - Just finished 7 days of Cephalexin for UTI  - UA negative  - US testes = No evidence of torsion. Hypoechoic heterogeneous hypervascular left intratesticular mass not significantly changed and concerning for malignancy  - Urology consulted = F/u CT abd/pel with IV contrast to delineate anatomy/mass. F/u AFP, HCG, LDH

## 2022-09-25 NOTE — ED ADULT NURSE NOTE - NS ED NURSE DISCH DISPOSITION
[>50% of Time Spent on Counseling for ____] : Greater than 50% of the encounter time was spent on counseling for [unfilled] [Time Spent: ___ minutes] : I have spent [unfilled] minutes of face to face time with the patient Admitted

## 2022-09-26 LAB
ALBUMIN SERPL ELPH-MCNC: 3.3 G/DL — LOW (ref 3.5–5)
ALP SERPL-CCNC: 97 U/L — SIGNIFICANT CHANGE UP (ref 40–120)
ALT FLD-CCNC: 167 U/L DA — HIGH (ref 10–60)
ANION GAP SERPL CALC-SCNC: 6 MMOL/L — SIGNIFICANT CHANGE UP (ref 5–17)
AST SERPL-CCNC: 81 U/L — HIGH (ref 10–40)
BILIRUB SERPL-MCNC: 0.3 MG/DL — SIGNIFICANT CHANGE UP (ref 0.2–1.2)
BLD GP AB SCN SERPL QL: SIGNIFICANT CHANGE UP
BUN SERPL-MCNC: 13 MG/DL — SIGNIFICANT CHANGE UP (ref 7–18)
CALCIUM SERPL-MCNC: 9.1 MG/DL — SIGNIFICANT CHANGE UP (ref 8.4–10.5)
CHLORIDE SERPL-SCNC: 106 MMOL/L — SIGNIFICANT CHANGE UP (ref 96–108)
CO2 SERPL-SCNC: 28 MMOL/L — SIGNIFICANT CHANGE UP (ref 22–31)
CREAT SERPL-MCNC: 0.84 MG/DL — SIGNIFICANT CHANGE UP (ref 0.5–1.3)
CULTURE RESULTS: NO GROWTH — SIGNIFICANT CHANGE UP
EGFR: 118 ML/MIN/1.73M2 — SIGNIFICANT CHANGE UP
GLUCOSE BLDC GLUCOMTR-MCNC: 100 MG/DL — HIGH (ref 70–99)
GLUCOSE BLDC GLUCOMTR-MCNC: 140 MG/DL — HIGH (ref 70–99)
GLUCOSE BLDC GLUCOMTR-MCNC: 90 MG/DL — SIGNIFICANT CHANGE UP (ref 70–99)
GLUCOSE BLDC GLUCOMTR-MCNC: 93 MG/DL — SIGNIFICANT CHANGE UP (ref 70–99)
GLUCOSE SERPL-MCNC: 90 MG/DL — SIGNIFICANT CHANGE UP (ref 70–99)
HCG-TM SERPL-ACNC: <1 MIU/ML — SIGNIFICANT CHANGE UP
HCT VFR BLD CALC: 45.7 % — SIGNIFICANT CHANGE UP (ref 39–50)
HGB BLD-MCNC: 14.7 G/DL — SIGNIFICANT CHANGE UP (ref 13–17)
INR BLD: 0.98 RATIO — SIGNIFICANT CHANGE UP (ref 0.88–1.16)
MAGNESIUM SERPL-MCNC: 2.1 MG/DL — SIGNIFICANT CHANGE UP (ref 1.6–2.6)
MCHC RBC-ENTMCNC: 25.3 PG — LOW (ref 27–34)
MCHC RBC-ENTMCNC: 32.2 GM/DL — SIGNIFICANT CHANGE UP (ref 32–36)
MCV RBC AUTO: 78.8 FL — LOW (ref 80–100)
NRBC # BLD: 0 /100 WBCS — SIGNIFICANT CHANGE UP (ref 0–0)
PHOSPHATE SERPL-MCNC: 3.1 MG/DL — SIGNIFICANT CHANGE UP (ref 2.5–4.5)
PLATELET # BLD AUTO: 184 K/UL — SIGNIFICANT CHANGE UP (ref 150–400)
POTASSIUM SERPL-MCNC: 4.5 MMOL/L — SIGNIFICANT CHANGE UP (ref 3.5–5.3)
POTASSIUM SERPL-SCNC: 4.5 MMOL/L — SIGNIFICANT CHANGE UP (ref 3.5–5.3)
PROT SERPL-MCNC: 6.8 G/DL — SIGNIFICANT CHANGE UP (ref 6–8.3)
PROTHROM AB SERPL-ACNC: 11.7 SEC — SIGNIFICANT CHANGE UP (ref 10.5–13.4)
RBC # BLD: 5.8 M/UL — SIGNIFICANT CHANGE UP (ref 4.2–5.8)
RBC # FLD: 14 % — SIGNIFICANT CHANGE UP (ref 10.3–14.5)
SODIUM SERPL-SCNC: 140 MMOL/L — SIGNIFICANT CHANGE UP (ref 135–145)
SPECIMEN SOURCE: SIGNIFICANT CHANGE UP
WBC # BLD: 4.94 K/UL — SIGNIFICANT CHANGE UP (ref 3.8–10.5)
WBC # FLD AUTO: 4.94 K/UL — SIGNIFICANT CHANGE UP (ref 3.8–10.5)

## 2022-09-26 RX ORDER — SODIUM CHLORIDE 9 MG/ML
1000 INJECTION INTRAMUSCULAR; INTRAVENOUS; SUBCUTANEOUS
Refills: 0 | Status: COMPLETED | OUTPATIENT
Start: 2022-09-26 | End: 2022-09-26

## 2022-09-26 RX ORDER — ALBUTEROL 90 UG/1
2.5 AEROSOL, METERED ORAL ONCE
Refills: 0 | Status: COMPLETED | OUTPATIENT
Start: 2022-09-26 | End: 2022-09-26

## 2022-09-26 RX ADMIN — Medication 1 MILLIGRAM(S): at 21:39

## 2022-09-26 RX ADMIN — Medication 50 MICROGRAM(S): at 06:07

## 2022-09-26 RX ADMIN — Medication 0: at 12:04

## 2022-09-26 RX ADMIN — DIVALPROEX SODIUM 500 MILLIGRAM(S): 500 TABLET, DELAYED RELEASE ORAL at 18:57

## 2022-09-26 RX ADMIN — RISPERIDONE 4 MILLIGRAM(S): 4 TABLET ORAL at 18:57

## 2022-09-26 RX ADMIN — SODIUM CHLORIDE 95 MILLILITER(S): 9 INJECTION INTRAMUSCULAR; INTRAVENOUS; SUBCUTANEOUS at 18:57

## 2022-09-26 RX ADMIN — Medication 15 MILLIGRAM(S): at 06:06

## 2022-09-26 RX ADMIN — ATORVASTATIN CALCIUM 10 MILLIGRAM(S): 80 TABLET, FILM COATED ORAL at 21:39

## 2022-09-26 RX ADMIN — PANTOPRAZOLE SODIUM 40 MILLIGRAM(S): 20 TABLET, DELAYED RELEASE ORAL at 06:07

## 2022-09-26 RX ADMIN — MIRTAZAPINE 15 MILLIGRAM(S): 45 TABLET, ORALLY DISINTEGRATING ORAL at 21:40

## 2022-09-26 RX ADMIN — HEPARIN SODIUM 5000 UNIT(S): 5000 INJECTION INTRAVENOUS; SUBCUTANEOUS at 18:59

## 2022-09-26 RX ADMIN — SODIUM CHLORIDE 90 MILLILITER(S): 9 INJECTION INTRAMUSCULAR; INTRAVENOUS; SUBCUTANEOUS at 18:57

## 2022-09-26 RX ADMIN — Medication 15 MILLIGRAM(S): at 18:57

## 2022-09-26 RX ADMIN — Medication 650 MILLIGRAM(S): at 18:59

## 2022-09-26 RX ADMIN — TAMSULOSIN HYDROCHLORIDE 0.4 MILLIGRAM(S): 0.4 CAPSULE ORAL at 21:38

## 2022-09-26 RX ADMIN — HEPARIN SODIUM 5000 UNIT(S): 5000 INJECTION INTRAVENOUS; SUBCUTANEOUS at 06:07

## 2022-09-26 RX ADMIN — RISPERIDONE 4 MILLIGRAM(S): 4 TABLET ORAL at 06:06

## 2022-09-26 RX ADMIN — ALBUTEROL 2.5 MILLIGRAM(S): 90 AEROSOL, METERED ORAL at 12:03

## 2022-09-26 RX ADMIN — Medication 325 MILLIGRAM(S): at 15:37

## 2022-09-26 RX ADMIN — DIVALPROEX SODIUM 500 MILLIGRAM(S): 500 TABLET, DELAYED RELEASE ORAL at 06:06

## 2022-09-26 RX ADMIN — SODIUM CHLORIDE 95 MILLILITER(S): 9 INJECTION INTRAMUSCULAR; INTRAVENOUS; SUBCUTANEOUS at 12:05

## 2022-09-26 NOTE — CHART NOTE - NSCHARTNOTEFT_GEN_A_CORE
31yo M PMHx of Autism Spectrum Disorder, Hypothyroidism, BPH, Constipation, Asthma who presented with testicular mass causing pain and swelling. Testicular US concerning for malignancy. Patient able denies chest pain or shortness of breath. Patient is a low-risk for cardiovascular disease. Patient able to walk independently on level ground without chest pain or shortness of breath, has good functional capacity. Patient in no respiratory distress. Mild wheezing in MILA. Will give Albuterol treatment prior to OR to reduce bronchospasm. Patient medically optimized for procedure. Reasonable to proceed without further cardiac testing.    Full progress note to follow.

## 2022-09-26 NOTE — PROGRESS NOTE ADULT - ASSESSMENT
33 y/o male with PMHx of asthma, HLD, GERD, Hypothyroid, Mood disorder, Autism, BPH presents to the ED with testicular pain concerning for malignancy.  Testicular pain which started few weeks ago is mostly on the left , sharp and worse with movement.  He was seen here 5 days ago diagnosed with probable testicular cancer discharge for  follow-up for diagnostic work-up.     - CT abd/pel with IV contrast to delineate anatomy/mass--> limited as scrotum not on ultrasound, no retroperitoneal lymphadenopathy  - Pain/nausea control   - f/u Tumor markers- AFP wnl, HCG wnl, LDH--> elevated 300s  - ADDED ON FOR OR TODAY for left radical orchiectomy for left testicular mass.   - Keep NPO  - Consent to be done with mother, discussed over the phone, insists she is there to sign consent prior to OR.    - Document medical clearance for OR.  - Other care/mgmt per primary team   - Urology will follow  - Discussed with attending, Dr. Paul

## 2022-09-26 NOTE — PROGRESS NOTE ADULT - ASSESSMENT
32M, from group home, with PMHx of Autism Spectrum Disorder, Asthma, HLD, GERD, Hypothyroid, Impulse control disorder, Sun sensitivity, and BPH, who comes in with 2 week history of increasing L testicular swelling and pain. Admitted for testicular pain and swelling.  Us testes negative for torsion. urology consulted, plan for surgical intervention, orchiectomy.

## 2022-09-27 ENCOUNTER — TRANSCRIPTION ENCOUNTER (OUTPATIENT)
Age: 32
End: 2022-09-27

## 2022-09-27 ENCOUNTER — RESULT REVIEW (OUTPATIENT)
Age: 32
End: 2022-09-27

## 2022-09-27 LAB
ANION GAP SERPL CALC-SCNC: 6 MMOL/L — SIGNIFICANT CHANGE UP (ref 5–17)
BUN SERPL-MCNC: 15 MG/DL — SIGNIFICANT CHANGE UP (ref 7–18)
CALCIUM SERPL-MCNC: 8.7 MG/DL — SIGNIFICANT CHANGE UP (ref 8.4–10.5)
CHLORIDE SERPL-SCNC: 108 MMOL/L — SIGNIFICANT CHANGE UP (ref 96–108)
CO2 SERPL-SCNC: 25 MMOL/L — SIGNIFICANT CHANGE UP (ref 22–31)
CREAT SERPL-MCNC: 0.84 MG/DL — SIGNIFICANT CHANGE UP (ref 0.5–1.3)
EGFR: 118 ML/MIN/1.73M2 — SIGNIFICANT CHANGE UP
GLUCOSE BLDC GLUCOMTR-MCNC: 108 MG/DL — HIGH (ref 70–99)
GLUCOSE BLDC GLUCOMTR-MCNC: 125 MG/DL — HIGH (ref 70–99)
GLUCOSE BLDC GLUCOMTR-MCNC: 91 MG/DL — SIGNIFICANT CHANGE UP (ref 70–99)
GLUCOSE BLDC GLUCOMTR-MCNC: 99 MG/DL — SIGNIFICANT CHANGE UP (ref 70–99)
GLUCOSE SERPL-MCNC: 111 MG/DL — HIGH (ref 70–99)
HCT VFR BLD CALC: 44.1 % — SIGNIFICANT CHANGE UP (ref 39–50)
HGB BLD-MCNC: 13.9 G/DL — SIGNIFICANT CHANGE UP (ref 13–17)
INR BLD: 1.02 RATIO — SIGNIFICANT CHANGE UP (ref 0.88–1.16)
MCHC RBC-ENTMCNC: 25.1 PG — LOW (ref 27–34)
MCHC RBC-ENTMCNC: 31.5 GM/DL — LOW (ref 32–36)
MCV RBC AUTO: 79.6 FL — LOW (ref 80–100)
NRBC # BLD: 0 /100 WBCS — SIGNIFICANT CHANGE UP (ref 0–0)
PLATELET # BLD AUTO: 168 K/UL — SIGNIFICANT CHANGE UP (ref 150–400)
POTASSIUM SERPL-MCNC: 4.4 MMOL/L — SIGNIFICANT CHANGE UP (ref 3.5–5.3)
POTASSIUM SERPL-SCNC: 4.4 MMOL/L — SIGNIFICANT CHANGE UP (ref 3.5–5.3)
PROTHROM AB SERPL-ACNC: 12.1 SEC — SIGNIFICANT CHANGE UP (ref 10.5–13.4)
RBC # BLD: 5.54 M/UL — SIGNIFICANT CHANGE UP (ref 4.2–5.8)
RBC # FLD: 14.3 % — SIGNIFICANT CHANGE UP (ref 10.3–14.5)
SODIUM SERPL-SCNC: 139 MMOL/L — SIGNIFICANT CHANGE UP (ref 135–145)
WBC # BLD: 4.53 K/UL — SIGNIFICANT CHANGE UP (ref 3.8–10.5)
WBC # FLD AUTO: 4.53 K/UL — SIGNIFICANT CHANGE UP (ref 3.8–10.5)

## 2022-09-27 PROCEDURE — 88309 TISSUE EXAM BY PATHOLOGIST: CPT | Mod: 26

## 2022-09-27 PROCEDURE — 99232 SBSQ HOSP IP/OBS MODERATE 35: CPT

## 2022-09-27 RX ORDER — OXYCODONE AND ACETAMINOPHEN 5; 325 MG/1; MG/1
2 TABLET ORAL EVERY 6 HOURS
Refills: 0 | Status: DISCONTINUED | OUTPATIENT
Start: 2022-09-27 | End: 2022-09-28

## 2022-09-27 RX ORDER — FENTANYL CITRATE 50 UG/ML
50 INJECTION INTRAVENOUS ONCE
Refills: 0 | Status: DISCONTINUED | OUTPATIENT
Start: 2022-09-27 | End: 2022-09-27

## 2022-09-27 RX ORDER — KETOROLAC TROMETHAMINE 30 MG/ML
15 SYRINGE (ML) INJECTION
Refills: 0 | Status: DISCONTINUED | OUTPATIENT
Start: 2022-09-27 | End: 2022-09-27

## 2022-09-27 RX ORDER — KETOROLAC TROMETHAMINE 30 MG/ML
15 SYRINGE (ML) INJECTION ONCE
Refills: 0 | Status: DISCONTINUED | OUTPATIENT
Start: 2022-09-27 | End: 2022-09-27

## 2022-09-27 RX ORDER — OXYCODONE AND ACETAMINOPHEN 5; 325 MG/1; MG/1
2 TABLET ORAL EVERY 6 HOURS
Refills: 0 | Status: DISCONTINUED | OUTPATIENT
Start: 2022-09-27 | End: 2022-09-27

## 2022-09-27 RX ORDER — FENTANYL CITRATE 50 UG/ML
25 INJECTION INTRAVENOUS
Refills: 0 | Status: DISCONTINUED | OUTPATIENT
Start: 2022-09-27 | End: 2022-09-27

## 2022-09-27 RX ADMIN — Medication 15 MILLIGRAM(S): at 11:37

## 2022-09-27 RX ADMIN — Medication 15 MILLIGRAM(S): at 03:25

## 2022-09-27 RX ADMIN — OXYCODONE AND ACETAMINOPHEN 2 TABLET(S): 5; 325 TABLET ORAL at 18:14

## 2022-09-27 RX ADMIN — OXYCODONE AND ACETAMINOPHEN 2 TABLET(S): 5; 325 TABLET ORAL at 21:42

## 2022-09-27 RX ADMIN — Medication 15 MILLIGRAM(S): at 12:15

## 2022-09-27 RX ADMIN — OXYCODONE AND ACETAMINOPHEN 2 TABLET(S): 5; 325 TABLET ORAL at 21:12

## 2022-09-27 RX ADMIN — Medication 15 MILLIGRAM(S): at 03:55

## 2022-09-27 NOTE — PROGRESS NOTE ADULT - PROBLEM SELECTOR PLAN 11
C/w home Buspirone, Valproic acid, Mirtazapine, Risperidone, Ativan  - Home Guanfacine 2mg daily will have to be ordered as freetext.
heparin  PPI    Dispo: Pending surgery today  - f/u further recs for urology  - Pt is from [facility]

## 2022-09-27 NOTE — PROGRESS NOTE ADULT - ASSESSMENT
33 y/o male with PMHx of asthma, HLD, GERD, Hypothyroid, Mood disorder, Autism, BPH presents to the ED with testicular pain concerning for malignancy.  Testicular pain which started few weeks ago is mostly on the left , sharp and worse with movement.  He was seen here 5 days ago diagnosed with probable testicular cancer discharge for  follow-up for diagnostic work-up.     - CT abd/pel with IV contrast to delineate anatomy/mass--> limited as scrotum not on ultrasound, no retroperitoneal lymphadenopathy  - Pain/nausea control   - f/u Tumor markers- AFP wnl, HCG wnl, LDH--> elevated 300s  - ADDED ON FOR OR TODAY (9/27) for left radical orchiectomy for left testicular mass.   - Keep NPO  - Consent done. Medical clearance obtained.  - Other care/mgmt per primary team   - Urology will follow  - Discussed with attending, Dr. Paul  31 y/o male with PMHx of asthma, HLD, GERD, Hypothyroid, Mood disorder, Autism, BPH presents to the ED with testicular pain concerning for malignancy.  Testicular pain which started few weeks ago is mostly on the left , sharp and worse with movement.  He was seen here 5 days ago diagnosed with probable testicular cancer discharge for  follow-up for diagnostic work-up.     - CT abd/pel with IV contrast to delineate anatomy/mass--> limited as scrotum not on ultrasound, no retroperitoneal lymphadenopathy  - Pain/nausea control   - f/u Tumor markers- AFP wnl, HCG wnl, LDH--> elevated 300s  - ADDED ON FOR OR TODAY (9/27) for left radical orchiectomy for left testicular mass.   - Keep NPO  - Consent done. Medical clearance obtained.  - Other care/mgmt per primary team   - Urology will follow  - Discussed with attending, Dr. Paul    Addendum: Now s/p L orchiectomy with Dr. Paul, in recovery room. Surgery successful.  Plan  - From urological perspective, patient can be discharged home after he returns to the floor from recovery.  - Can start regular diet.  - f/u with Dr. Paul on discharge for further management of Left testicular tumor.    PLEASE INCLUDE THE FOLLOWING IN THE DISCHARGE PAPERWORK    Discharge Instructions for Penile Prosthesis    Follow up: Please call my office to schedule a post-operative appointment at the office 1-2 weeks after surgery.     Discomfort: You may take pain medication that is given to you if you feel it is needed. You may have some intermittent pain for up to six (6) weeks post operatively. Pain does not signify any problem unless associated with fever, chills, or inability to void.  If you experience any fevers or chills please call immediately as this may be signs of an infection. No need for antibiotics after procedure. Can take tylenol and ibuprofen over the counter for pain. You are likely to have a sensation of pulling and discomfort on one or both sides that may last as long as several months. This is a natural sensation of healing.    Stitches: The stitches in the incision will dissolve by themselves. Sometimes skin stitches may open, allowing a slight gaping of the incision. This is no problem if you keep the area clean.  There are steristrips over the incision that will fall off on their own as the incision heals.     Swelling, Lumps and Bumps: There may be discomfort in the scrotum. This is of no concern and with time they will soften and disappear.  Any “black and blue” bruising areas will also resolve.  Normally, there is also swelling of the scrotum post operatively. Sometimes the tissue fluid which causes the swelling migrates to the penile skin and can look alarming; with time, all the swelling will eventually subside but may take weeks.  You may apply an ice-pack for 15 minutes out of every hour for the first 24 -36 hours. This can help with swelling.    Showers/Baths: Do not take showers until 48 hours after surgery. No bathing or swimming until your urologist tells you this is safe.     Return to Work: You should be able to return to work within several days to one week after surgery. Please refrain from strenuous exercise or heavy lifting for at least 2 weeks.

## 2022-09-27 NOTE — PROGRESS NOTE ADULT - PROBLEM SELECTOR PLAN 1
P/w 2 week history of increasing L testicular swelling and pain  - UA negative  - US testes = No evidence of torsion. Hypoechoic heterogeneous hypervascular left intratesticular mass not significantly changed and concerning for malignancy  - Ct a/p- no lymphadenopathy.  - AFP, LDH, HCG noted  - plan for  orchiectomy 9/27/22  - Urology following
P/w 2 week history of increasing L testicular swelling and pain  - UA negative  - US testes = No evidence of torsion. Hypoechoic heterogeneous hypervascular left intratesticular mass not significantly changed and concerning for malignancy  - Ct a/p- no lymphadenopathy.  - AFP, LDH, HCG noted  - plan for  orchiectomy 9/26/22  - Urology following

## 2022-09-27 NOTE — PROGRESS NOTE ADULT - SUBJECTIVE AND OBJECTIVE BOX
Surgery Progress Note     Subjective/24hour Events:   Patient seen and examined.   No acute events overnight. Doing well.   Pain controlled.     Vital Signs:  Vital Signs Last 24 Hrs  T(C): 36.5 (26 Sep 2022 08:15), Max: 36.6 (25 Sep 2022 11:38)  T(F): 97.7 (26 Sep 2022 08:15), Max: 97.8 (25 Sep 2022 11:38)  HR: 68 (26 Sep 2022 08:15) (53 - 68)  BP: 109/74 (26 Sep 2022 08:15) (106/70 - 117/78)  BP(mean): --  RR: 20 (26 Sep 2022 08:15) (18 - 20)  SpO2: 99% (26 Sep 2022 05:53) (97% - 99%)    Parameters below as of 26 Sep 2022 05:53  Patient On (Oxygen Delivery Method): room air        CAPILLARY BLOOD GLUCOSE      POCT Blood Glucose.: 100 mg/dL (26 Sep 2022 07:55)  POCT Blood Glucose.: 120 mg/dL (25 Sep 2022 21:17)  POCT Blood Glucose.: 100 mg/dL (25 Sep 2022 16:50)  POCT Blood Glucose.: 130 mg/dL (25 Sep 2022 11:35)      I&O's Detail      MEDICATIONS  (STANDING):  atorvastatin 10 milliGRAM(s) Oral at bedtime  busPIRone 15 milliGRAM(s) Oral every 12 hours  diVALproex  milliGRAM(s) Oral <User Schedule>  ferrous    sulfate 325 milliGRAM(s) Oral daily  heparin   Injectable 5000 Unit(s) SubCutaneous every 12 hours  insulin lispro (ADMELOG) corrective regimen sliding scale   SubCutaneous three times a day before meals  insulin lispro (ADMELOG) corrective regimen sliding scale   SubCutaneous at bedtime  levothyroxine 50 MICROGram(s) Oral daily  LORazepam     Tablet 1 milliGRAM(s) Oral at bedtime  mirtazapine 15 milliGRAM(s) Oral at bedtime  pantoprazole    Tablet 40 milliGRAM(s) Oral before breakfast  risperiDONE   Tablet 4 milliGRAM(s) Oral two times a day  sodium chloride 0.9%. 1000 milliLiter(s) (90 mL/Hr) IV Continuous <Continuous>  tamsulosin 0.4 milliGRAM(s) Oral at bedtime    MEDICATIONS  (PRN):  acetaminophen     Tablet .. 650 milliGRAM(s) Oral every 6 hours PRN Temp greater or equal to 38C (100.4F), Mild Pain (1 - 3), Moderate Pain (4 - 6)  ALBUTerol    90 MICROgram(s) HFA Inhaler 2 Puff(s) Inhalation every 6 hours PRN Shortness of Breath and/or Wheezing  aluminum hydroxide/magnesium hydroxide/simethicone Suspension 30 milliLiter(s) Oral every 4 hours PRN Dyspepsia  melatonin 3 milliGRAM(s) Oral at bedtime PRN Insomnia  ondansetron Injectable 4 milliGRAM(s) IV Push every 8 hours PRN Nausea and/or Vomiting      Physical Exam:  Gen: NAD.  Lungs: Non labored breathing.   Ab: Soft, nontender, nondistended.   : R testicle soft, L testicle generalized tenderness, hard, no distinct mass palpated.    Labs:    09-25    137  |  108  |  18  ----------------------------<  113<H>  4.5   |  24  |  0.92    Ca    8.6      25 Sep 2022 04:01    TPro  6.6  /  Alb  3.0<L>  /  TBili  0.3  /  DBili  x   /  AST  61<H>  /  ALT  144<H>  /  AlkPhos  100  09-25    LIVER FUNCTIONS - ( 25 Sep 2022 04:01 )  Alb: 3.0 g/dL / Pro: 6.6 g/dL / ALK PHOS: 100 U/L / ALT: 144 U/L DA / AST: 61 U/L / GGT: x                                 13.7   5.94  )-----------( 177      ( 25 Sep 2022 04:01 )             42.8     < from: US Testicles (09.25.22 @ 03:01) >   US SCROTUM AND CONTENTS                          PROCEDURE DATE:  09/25/2022          INTERPRETATION:  CLINICAL INFORMATION: Left testicular pain.    COMPARISON: Scrotal sonogram 9/19/2022    TECHNIQUE: Testicular ultrasound utilizing color and spectral Doppler.    FINDINGS:    RIGHT:  Right testis: 3.9 cm x 2.0 cm x  3.0 cm. Normal echogenicity and   echotexture with no masses or areas of architectural distortion. Normal   arterial and venous blood flow pattern.  Right epididymis: Within normal limits.  Right hydrocele: Trace  Right varicocele: None.    LEFT:  Left testis: 5.1 cm x 3.1 cm x 4.5 cm. Grossly unchanged hypoechoic   heterogeneous and hypervascular masses seen within the left testicle   measuring 3 3.9 x 2.8 x 3 3.6 cm. Normal arterial and venous blood flow   pattern in the surrounding testicular parenchyma.  Left epididymis: Within normal limits.  Left hydrocele: Trace  Left varicocele: None.    IMPRESSION:  No evidence of testicular torsion at time ofexam.    Hypoechoic heterogeneous hypervascular left intratesticular mass not   significantly changed and concerning for malignancy. Follow-up with   urology is recommended.    --- End of Report ---            WENDY FUNEZ MD; Attending Radiologist  This document has been electronically signed. Sep 25 2022  3:47AM    < end of copied text >      
Surgery Progress Note     Subjective/24hour Events:   Patient seen and examined.   No acute events overnight. Doing well, no complaints, ready for surgery.  Pain controlled.     Vital Signs:  Vital Signs Last 24 Hrs  T(C): 36.4 (27 Sep 2022 05:09), Max: 36.8 (27 Sep 2022 02:51)  T(F): 97.5 (27 Sep 2022 05:09), Max: 98.2 (27 Sep 2022 02:51)  HR: 68 (27 Sep 2022 05:09) (61 - 72)  BP: 110/63 (27 Sep 2022 05:09) (97/62 - 110/75)  BP(mean): 74 (27 Sep 2022 05:09) (74 - 81)  RR: 18 (27 Sep 2022 05:09) (18 - 18)  SpO2: 98% (27 Sep 2022 05:09) (94% - 98%)    Parameters below as of 27 Sep 2022 05:09  Patient On (Oxygen Delivery Method): room air        CAPILLARY BLOOD GLUCOSE      POCT Blood Glucose.: 99 mg/dL (27 Sep 2022 07:44)  POCT Blood Glucose.: 140 mg/dL (26 Sep 2022 21:37)  POCT Blood Glucose.: 90 mg/dL (26 Sep 2022 16:41)  POCT Blood Glucose.: 93 mg/dL (26 Sep 2022 11:49)      I&O's Detail      MEDICATIONS  (STANDING):  atorvastatin 10 milliGRAM(s) Oral at bedtime  busPIRone 15 milliGRAM(s) Oral every 12 hours  diVALproex  milliGRAM(s) Oral <User Schedule>  ferrous    sulfate 325 milliGRAM(s) Oral daily  heparin   Injectable 5000 Unit(s) SubCutaneous every 12 hours  insulin lispro (ADMELOG) corrective regimen sliding scale   SubCutaneous three times a day before meals  insulin lispro (ADMELOG) corrective regimen sliding scale   SubCutaneous at bedtime  levothyroxine 50 MICROGram(s) Oral daily  LORazepam     Tablet 1 milliGRAM(s) Oral at bedtime  mirtazapine 15 milliGRAM(s) Oral at bedtime  pantoprazole    Tablet 40 milliGRAM(s) Oral before breakfast  risperiDONE   Tablet 4 milliGRAM(s) Oral two times a day  tamsulosin 0.4 milliGRAM(s) Oral at bedtime    MEDICATIONS  (PRN):  acetaminophen     Tablet .. 650 milliGRAM(s) Oral every 6 hours PRN Temp greater or equal to 38C (100.4F), Mild Pain (1 - 3), Moderate Pain (4 - 6)  ALBUTerol    90 MICROgram(s) HFA Inhaler 2 Puff(s) Inhalation every 6 hours PRN Shortness of Breath and/or Wheezing  aluminum hydroxide/magnesium hydroxide/simethicone Suspension 30 milliLiter(s) Oral every 4 hours PRN Dyspepsia  melatonin 3 milliGRAM(s) Oral at bedtime PRN Insomnia  ondansetron Injectable 4 milliGRAM(s) IV Push every 8 hours PRN Nausea and/or Vomiting      Physical Exam:  Gen: NAD.  Lungs: Non labored breathing.   Ab: Soft, nontender, nondistended.   : L testicle tender and hard, R testicle soft, wnl    Labs:    09-27    139  |  108  |  15  ----------------------------<  111<H>  4.4   |  25  |  0.84    Ca    8.7      27 Sep 2022 07:04  Phos  3.1     09-26  Mg     2.1     09-26    TPro  6.8  /  Alb  3.3<L>  /  TBili  0.3  /  DBili  x   /  AST  81<H>  /  ALT  167<H>  /  AlkPhos  97  09-26    LIVER FUNCTIONS - ( 26 Sep 2022 11:50 )  Alb: 3.3 g/dL / Pro: 6.8 g/dL / ALK PHOS: 97 U/L / ALT: 167 U/L DA / AST: 81 U/L / GGT: x                                 13.9   4.53  )-----------( 168      ( 27 Sep 2022 07:04 )             44.1     PT/INR - ( 27 Sep 2022 07:04 )   PT: 12.1 sec;   INR: 1.02 ratio             
  Patient is a 32y old  Male who presents with a chief complaint of Testicular swelling and pain (27 Sep 2022 09:32)      INTERVAL HPI/OVERNIGHT EVENTS: no acute events overnight    REVIEW OF SYSTEMS:  CONSTITUTIONAL: No fever, chills  ENMT:  No difficulty hearing, no change in vision  NECK: No pain or stiffness  RESPIRATORY: No cough, SOB  CARDIOVASCULAR: No chest pain, palpitations  GASTROINTESTINAL: No abdominal pain. No nausea, vomiting, or diarrhea  GENITOURINARY: No dysuria  NEUROLOGICAL: No HA  SKIN: No itching, burning, rashes, or lesions   LYMPH NODES: No enlarged glands  ENDOCRINE: No heat or cold intolerance; No hair loss  MUSCULOSKELETAL: No joint pain or swelling; No muscle, back, or extremity pain  PSYCHIATRIC: No depression, anxiety  HEME/LYMPH: No easy bruising, or bleeding gums    T(C): 36.6 (09-27-22 @ 12:45), Max: 36.8 (09-27-22 @ 02:51)  HR: 65 (09-27-22 @ 12:45) (61 - 72)  BP: 125/79 (09-27-22 @ 12:45) (97/62 - 125/79)  RR: 18 (09-27-22 @ 12:45) (18 - 18)  SpO2: 98% (09-27-22 @ 12:45) (94% - 98%)  Wt(kg): --Vital Signs Last 24 Hrs  T(C): 36.6 (27 Sep 2022 12:45), Max: 36.8 (27 Sep 2022 02:51)  T(F): 97.8 (27 Sep 2022 12:45), Max: 98.2 (27 Sep 2022 02:51)  HR: 65 (27 Sep 2022 12:45) (61 - 72)  BP: 125/79 (27 Sep 2022 12:45) (97/62 - 125/79)  BP(mean): 74 (27 Sep 2022 05:09) (74 - 74)  RR: 18 (27 Sep 2022 12:45) (18 - 18)  SpO2: 98% (27 Sep 2022 12:45) (94% - 98%)    Parameters below as of 27 Sep 2022 12:45  Patient On (Oxygen Delivery Method): room air    PHYSICAL EXAM:  GENERAL: NAD  EYES: clear conjunctiva; EOMI  ENMT: Moist mucous membranes  NECK: Supple, No JVD, Normal thyroid  CHEST/LUNG: Clear to auscultation bilaterally  HEART: S1, S2, Regular rate and rhythm  ABDOMEN: Soft, Nontender, Nondistended; Bowel sounds present  NEURO: Alert & awake  EXTREMITIES: No LE edema, no calf tenderness  LYMPH: No lymphadenopathy noted  SKIN: No rashes or lesions  : L testicular swelling    Consultant(s) Notes Reviewed:  [x ] YES  [ ] NO  Care Discussed with Consultants/Other Providers [ x] YES  [ ] NO    LABS:                        13.9   4.53  )-----------( 168      ( 27 Sep 2022 07:04 )             44.1     09-27    139  |  108  |  15  ----------------------------<  111<H>  4.4   |  25  |  0.84    Ca    8.7      27 Sep 2022 07:04  Phos  3.1     09-26  Mg     2.1     09-26    TPro  6.8  /  Alb  3.3<L>  /  TBili  0.3  /  DBili  x   /  AST  81<H>  /  ALT  167<H>  /  AlkPhos  97  09-26    PT/INR - ( 27 Sep 2022 07:04 )   PT: 12.1 sec;   INR: 1.02 ratio           CAPILLARY BLOOD GLUCOSE      POCT Blood Glucose.: 91 mg/dL (27 Sep 2022 11:59)  POCT Blood Glucose.: 99 mg/dL (27 Sep 2022 07:44)  POCT Blood Glucose.: 140 mg/dL (26 Sep 2022 21:37)  POCT Blood Glucose.: 90 mg/dL (26 Sep 2022 16:41)    RADIOLOGY & ADDITIONAL TESTS:    Imaging Personally Reviewed:  [ x] YES  [ ] NO  < from: CT Abdomen and Pelvis w/ IV Cont (09.25.22 @ 11:52) >  ACC: [id number] EXAM:  CT ABDOMEN AND PELVIS IC                          PROCEDURE DATE:  09/25/2022          INTERPRETATION:  CLINICAL INFORMATION: Left testicular mass.    COMPARISON: Scrotal ultrasound performed the same day.    CONTRAST/COMPLICATIONS:  IV Contrast: Omnipaque 350  90 cc administered   10 cc discarded  Oral Contrast: NONE  Complications: None reported at time of study completion    PROCEDURE:  CT of the Abdomen and Pelvis was performed.  Sagittal and coronal reformats were performed.    FINDINGS:  LOWER CHEST: Trace bilateral layering pleural fluid with minimal   bilateral lower lobe dependent compressive atelectasis.    LIVER: Moderate diffuse decreased attenuation of the liver, compatible   with steatosis. Otherwise, within normal limits.  BILE DUCTS: Normal caliber.  GALLBLADDER: Within normal limits.  SPLEEN: Within normal limits.  PANCREAS: Within normal limits.  ADRENALS: Within normal limits.  KIDNEYS/URETERS: Within normal limits.    BLADDER: Within normal limits.  REPRODUCTIVE ORGANS: The prostate is not enlarged.    BOWEL: No bowel obstruction. Appendix is normal.  PERITONEUM: No ascites.  VESSELS: Within normal limits.  RETROPERITONEUM/LYMPH NODES: No lymphadenopathy. There is no para-aortic   or pelvic lymphadenopathy.  ABDOMINAL WALL: Within normal limits.  BONES: Within normal limits.    IMPRESSION: No evidence of lymphadenopathy.    --- End of Report ---            KIMMIE ALFRED MD; Attending Radiologist  This document has been electronically signed. Sep 25 2022 11:57AM    < end of copied text >  < from: US Testicles (09.25.22 @ 03:01) >    ACC: [id number] EXAM:  US SCROTUM AND CONTENTS                          PROCEDURE DATE:  09/25/2022          INTERPRETATION:  CLINICAL INFORMATION: Left testicular pain.    COMPARISON: Scrotal sonogram 9/19/2022    TECHNIQUE: Testicular ultrasound utilizing color and spectral Doppler.    FINDINGS:    RIGHT:  Right testis: 3.9 cm x 2.0 cm x  3.0 cm. Normal echogenicity and   echotexture with no masses or areas of architectural distortion. Normal   arterial and venous blood flow pattern.  Right epididymis: Within normal limits.  Right hydrocele: Trace  Right varicocele: None.    LEFT:  Left testis: 5.1 cm x 3.1 cm x 4.5 cm. Grossly unchanged hypoechoic   heterogeneous and hypervascular masses seen within the left testicle   measuring 3 3.9 x 2.8 x 3 3.6 cm. Normal arterial and venous blood flow   pattern in the surrounding testicular parenchyma.  Left epididymis: Within normal limits.  Left hydrocele: Trace  Left varicocele: None.    IMPRESSION:  No evidence of testicular torsion at time ofexam.    Hypoechoic heterogeneous hypervascular left intratesticular mass not   significantly changed and concerning for malignancy. Follow-up with   urology is recommended.    --- End of Report ---            WENDY FUNEZ MD; Attending Radiologist  This document has been electronically signed. Sep 25 2022  3:47AM    < end of copied text >    
Patient is a 32y old  Male who presents with a chief complaint of Testicular swelling and pain (26 Sep 2022 09:18)      INTERVAL HPI/OVERNIGHT EVENTS: no overnight events    I&O's Summary    Vital Signs Last 24 Hrs  T(C): 36.5 (26 Sep 2022 08:15), Max: 36.6 (25 Sep 2022 11:38)  T(F): 97.7 (26 Sep 2022 08:15), Max: 97.8 (25 Sep 2022 11:38)  HR: 68 (26 Sep 2022 08:15) (53 - 68)  BP: 109/74 (26 Sep 2022 08:15) (106/70 - 117/78)  BP(mean): --  RR: 20 (26 Sep 2022 08:15) (18 - 20)  SpO2: 99% (26 Sep 2022 05:53) (97% - 99%)    Parameters below as of 26 Sep 2022 05:53  Patient On (Oxygen Delivery Method): room air      PAST MEDICAL & SURGICAL HISTORY:  Mood disorder      Intellectual disability      Asthma      Obesity      GERD (gastroesophageal reflux disease)      Asthma      GERD (gastroesophageal reflux disease)      Factitious disorder      Urinary retention      Enuresis      Myopia of both eyes      Autism      Hypothyroidism      Hypothyroid      History of BPH      Dyslipidemia      HLD (hyperlipidemia)      No significant past surgical history      No significant past surgical history          SOCIAL HISTORY  Alcohol:  Tobacco:  Illicit substance use:      FAMILY HISTORY:      LABS:                        13.7   5.94  )-----------( 177      ( 25 Sep 2022 04:01 )             42.8     09-25    137  |  108  |  18  ----------------------------<  113<H>  4.5   |  24  |  0.92    Ca    8.6      25 Sep 2022 04:01    TPro  6.6  /  Alb  3.0<L>  /  TBili  0.3  /  DBili  x   /  AST  61<H>  /  ALT  144<H>  /  AlkPhos  100  09-25      Urinalysis Basic - ( 25 Sep 2022 05:38 )    Color: Yellow / Appearance: Clear / S.020 / pH: x  Gluc: x / Ketone: Trace  / Bili: Negative / Urobili: Negative   Blood: x / Protein: Negative / Nitrite: Negative   Leuk Esterase: Negative / RBC: x / WBC x   Sq Epi: x / Non Sq Epi: x / Bacteria: x      CAPILLARY BLOOD GLUCOSE      POCT Blood Glucose.: 100 mg/dL (26 Sep 2022 07:55)  POCT Blood Glucose.: 120 mg/dL (25 Sep 2022 21:17)  POCT Blood Glucose.: 100 mg/dL (25 Sep 2022 16:50)  POCT Blood Glucose.: 130 mg/dL (25 Sep 2022 11:35)        Urinalysis Basic - ( 25 Sep 2022 05:38 )    Color: Yellow / Appearance: Clear / S.020 / pH: x  Gluc: x / Ketone: Trace  / Bili: Negative / Urobili: Negative   Blood: x / Protein: Negative / Nitrite: Negative   Leuk Esterase: Negative / RBC: x / WBC x   Sq Epi: x / Non Sq Epi: x / Bacteria: x        MEDICATIONS  (STANDING):  ALBUTerol    0.083%. 2.5 milliGRAM(s) Nebulizer once  atorvastatin 10 milliGRAM(s) Oral at bedtime  busPIRone 15 milliGRAM(s) Oral every 12 hours  diVALproex  milliGRAM(s) Oral <User Schedule>  ferrous    sulfate 325 milliGRAM(s) Oral daily  heparin   Injectable 5000 Unit(s) SubCutaneous every 12 hours  insulin lispro (ADMELOG) corrective regimen sliding scale   SubCutaneous three times a day before meals  insulin lispro (ADMELOG) corrective regimen sliding scale   SubCutaneous at bedtime  levothyroxine 50 MICROGram(s) Oral daily  LORazepam     Tablet 1 milliGRAM(s) Oral at bedtime  mirtazapine 15 milliGRAM(s) Oral at bedtime  pantoprazole    Tablet 40 milliGRAM(s) Oral before breakfast  risperiDONE   Tablet 4 milliGRAM(s) Oral two times a day  sodium chloride 0.9%. 1000 milliLiter(s) (90 mL/Hr) IV Continuous <Continuous>  sodium chloride 0.9%. 1000 milliLiter(s) (95 mL/Hr) IV Continuous <Continuous>  tamsulosin 0.4 milliGRAM(s) Oral at bedtime    MEDICATIONS  (PRN):  acetaminophen     Tablet .. 650 milliGRAM(s) Oral every 6 hours PRN Temp greater or equal to 38C (100.4F), Mild Pain (1 - 3), Moderate Pain (4 - 6)  ALBUTerol    90 MICROgram(s) HFA Inhaler 2 Puff(s) Inhalation every 6 hours PRN Shortness of Breath and/or Wheezing  aluminum hydroxide/magnesium hydroxide/simethicone Suspension 30 milliLiter(s) Oral every 4 hours PRN Dyspepsia  melatonin 3 milliGRAM(s) Oral at bedtime PRN Insomnia  ondansetron Injectable 4 milliGRAM(s) IV Push every 8 hours PRN Nausea and/or Vomiting      REVIEW OF SYSTEMS:  CONSTITUTIONAL: No fever  EYES: No eye pain, visual disturbances  ENMT:  No difficulty hearing; No sinus or throat pain  NECK: No pain   RESPIRATORY: No cough. +wheezing, chills; No shortness of breath  CARDIOVASCULAR: No chest pain, palpitations, dizziness, or leg swelling  GASTROINTESTINAL: No abdominal pain. No nausea, vomiting. + diarrhea   GENITOURINARY: No dysuria, frequency, hematuria  NEUROLOGICAL: No headaches  SKIN: red testicle  MUSCULOSKELETAL: No joint pain or swelling    RADIOLOGY & ADDITIONAL TESTS:< from: US Testicles (22 @ 03:01) >    ACC: [id number] EXAM:  US SCROTUM AND CONTENTS                          PROCEDURE DATE:  2022          INTERPRETATION:  CLINICAL INFORMATION: Left testicular pain.    COMPARISON: Scrotal sonogram 2022    TECHNIQUE: Testicular ultrasound utilizing color and spectral Doppler.    FINDINGS:    RIGHT:  Right testis: 3.9 cm x 2.0 cm x  3.0 cm. Normal echogenicity and   echotexture with no masses or areas of architectural distortion. Normal   arterial and venous blood flow pattern.  Right epididymis: Within normal limits.  Right hydrocele: Trace  Right varicocele: None.    LEFT:  Left testis: 5.1 cm x 3.1 cm x 4.5 cm. Grossly unchanged hypoechoic   heterogeneous and hypervascular masses seen within the left testicle   measuring 3 3.9 x 2.8 x 3 3.6 cm. Normal arterial and venous blood flow   pattern in the surrounding testicular parenchyma.  Left epididymis: Within normal limits.  Left hydrocele: Trace  Left varicocele: None.    IMPRESSION:  No evidence of testicular torsion at time ofexam.    Hypoechoic heterogeneous hypervascular left intratesticular mass not   significantly changed and concerning for malignancy. Follow-up with   urology is recommended.    --- End of Report ---            WENDY FUNEZ MD; Attending Radiologist  This document has been electronically signed. Sep 25 2022  3:47AM    < end of copied text >      ACC: [id number] EXAM:  CT ABDOMEN AND PELVIS IC                          PROCEDURE DATE:  2022          INTERPRETATION:  CLINICAL INFORMATION: Left testicular mass.    COMPARISON: Scrotal ultrasound performed the same day.    CONTRAST/COMPLICATIONS:  IV Contrast: Omnipaque 350  90 cc administered   10 cc discarded  Oral Contrast: NONE  Complications: None reported at time of study completion    PROCEDURE:  CT of the Abdomen and Pelvis was performed.  Sagittal and coronal reformats were performed.    FINDINGS:  LOWER CHEST: Trace bilateral layering pleural fluid with minimal   bilateral lower lobe dependent compressive atelectasis.    LIVER: Moderate diffuse decreased attenuation of the liver, compatible   with steatosis. Otherwise, within normal limits.  BILE DUCTS: Normal caliber.  GALLBLADDER: Within normal limits.  SPLEEN: Within normal limits.  PANCREAS: Within normal limits.  ADRENALS: Within normal limits.  KIDNEYS/URETERS: Within normal limits.    BLADDER: Within normal limits.  REPRODUCTIVE ORGANS: The prostate is not enlarged.    BOWEL: No bowel obstruction. Appendix is normal.  PERITONEUM: No ascites.  VESSELS: Within normal limits.  RETROPERITONEUM/LYMPH NODES: No lymphadenopathy. There is no para-aortic   or pelvic lymphadenopathy.  ABDOMINAL WALL: Within normal limits.  BONES: Within normal limits.    IMPRESSION: No evidence of lymphadenopathy.    --- End of Report ---      KIMMIE ALFRED MD; Attending Radiologist  This document has been electronically signed. Sep 25 2022 11:57AM    Imaging Personally Reviewed:  [x ] YES  [ ] NO    Consultant(s) Notes Reviewed:  [ x] YES  [ ] NO    PHYSICAL EXAM:  GENERAL: NAD  HEAD:  Atraumatic, Normocephalic  EYES: conjunctiva and sclera clear  ENMT:  Moist mucous membranes  NECK: Supple  NERVOUS SYSTEM:  Alert & Oriented X3  CHEST/LUNG: CTA; + expiratory wheezing  HEART: Regular rate and rhythm  ABDOMEN: Soft, Nontender, Nondistended; Bowel sounds present  EXTREMITIES:  2+ Peripheral Pulses, No clubbing, cyanosis, or edema  SKIN: testicular redness    Care Collaborated Discussed with Consultants/Other Providers [x ] YES  [ ] NO

## 2022-09-27 NOTE — DISCHARGE NOTE PROVIDER - HOSPITAL COURSE
32M, from group home, with PMHx of Autism Spectrum Disorder, Asthma, HLD, GERD, Hypothyroid, Impulse control disorder, Sun sensitivity, and BPH, who comes in with 2 week history of increasing L testicular swelling and pain. Admitted for testicular pain and swelling.  Us testes negative for torsion. 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 Urology followed and cleared pt for discharge. Pt will follow up Dr Newton in 1-2 weeks as outpatient.   Please note that this a brief summary of hospital course please refer to daily progress notes and consult notes for full course and events   32M, from group home, with PMHx of Autism Spectrum Disorder, Asthma, HLD, GERD, Hypothyroid, Impulse control disorder, Sun sensitivity, and BPH, who comes in with 2 week history of increasing L testicular swelling and pain. Admitted for testicular pain and swelling.  Us testes negative for torsion. 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 Urology followed and cleared pt for discharge. Pt will follow up Dr Newton in 1-2 weeks as outpatient.   Please note that this a brief summary of hospital course please refer to daily progress notes and consult notes for full course and events    [facility] and updated pt's sister who answered mom's phone. stated that mom is aware. all questions were addressed.

## 2022-09-27 NOTE — PROGRESS NOTE ADULT - REASON FOR ADMISSION
Testicular swelling and pain

## 2022-09-27 NOTE — DISCHARGE NOTE PROVIDER - NSDCMRMEDTOKEN_GEN_ALL_CORE_FT
albuterol 90 mcg/inh inhalation aerosol: 2 puff(s) inhaled every 6 hours, As needed, Shortness of Breath and/or Wheezing  busPIRone 15 mg oral tablet: 1 tab(s) orally every 12 hours  divalproex sodium 500 mg oral tablet, extended release: 1 tab(s) orally 2 times a day  ferrous sulfate 325 mg (65 mg elemental iron) oral tablet: 1 tab(s) orally once a day  guanFACINE 2 mg oral tablet: 1 tab(s) orally once a day  levothyroxine 50 mcg (0.05 mg) oral tablet: 1 tab(s) orally once a day in AM  loperamide 2 mg oral tablet, chewable: 1 tab(s) chewed 2 times a day, As Needed -for diarrhea   LORazepam 1 mg oral tablet: 1 tab(s) orally once a day (at bedtime)  MiraLax oral powder for reconstitution: 17 gram(s) orally once a day, As Needed -for congestion - for constipation   mirtazapine 15 mg oral tablet: 1 tab(s) orally once a day (at bedtime)  pantoprazole 20 mg oral delayed release tablet: 1 tab(s) orally once a day  pravastatin 20 mg oral tablet: 1 tab(s) orally once a day  risperiDONE 4 mg oral tablet: 1 tab(s) orally 2 times a day  senna oral tablet: 2 tab(s) orally once a day (at bedtime)   tamsulosin 0.4 mg oral capsule: 1 cap(s) orally once a day (at bedtime)   albuterol 90 mcg/inh inhalation aerosol: 2 puff(s) inhaled every 6 hours, As needed, Shortness of Breath and/or Wheezing  busPIRone 15 mg oral tablet: 1 tab(s) orally every 12 hours  divalproex sodium 500 mg oral tablet, extended release: 1 tab(s) orally 2 times a day  ferrous sulfate 325 mg (65 mg elemental iron) oral tablet: 1 tab(s) orally once a day  guanFACINE 2 mg oral tablet: 1 tab(s) orally once a day  levothyroxine 50 mcg (0.05 mg) oral tablet: 1 tab(s) orally once a day in AM  loperamide 2 mg oral tablet, chewable: 1 tab(s) chewed 2 times a day, As Needed -for diarrhea   LORazepam 1 mg oral tablet: 1 tab(s) orally once a day (at bedtime)  MiraLax oral powder for reconstitution: 17 gram(s) orally once a day, As Needed -for congestion - for constipation   mirtazapine 15 mg oral tablet: 1 tab(s) orally once a day (at bedtime)  oxycodone-acetaminophen 5 mg-325 mg oral tablet: 1 tab(s) orally every 6 hours, As Needed -Moderate Pain (4 - 6) MDD:4  pantoprazole 20 mg oral delayed release tablet: 1 tab(s) orally once a day  pravastatin 20 mg oral tablet: 1 tab(s) orally once a day  risperiDONE 4 mg oral tablet: 1 tab(s) orally 2 times a day  senna oral tablet: 2 tab(s) orally once a day (at bedtime)   tamsulosin 0.4 mg oral capsule: 1 cap(s) orally once a day (at bedtime)

## 2022-09-27 NOTE — PROGRESS NOTE ADULT - NS ATTEND AMEND GEN_ALL_CORE FT
32M PMH asthma, HLD, GERD, hypothyroid, atuism, BPH, p/w testicular pain, L testicular mass on US concerning for malignancy. Planning for radical orchiectomy today with Urology. DC pending return to adult living facility.    VSS, PE as above, labs reviewed    #L testicular mass, new  #testicular pain, acute  #HLD, stable  #hypothyroid, stable  #asthma, stable  #BPH  #autism  - orchiectomy today, f/u urology recs  - add toradol prn for pain, c/w tylenol  - rest of care as above
Spoke with patient and mother at bedside. Patient anxious to have surgery, left testes swollen. Patient is an addon case for orchiectomy today. Give albuterol nebulizer for mild wheezing. Patient has no respiratory distress. Keep NPO, but if case cancelled then can feed tonight and plan for surgery tomorrow. Patient has good functional capacity. No indication for further cardiac testing. Low-risk for a low-risk procedure. Give bronchodilator pre-operatively.

## 2022-09-27 NOTE — PROGRESS NOTE ADULT - ASSESSMENT
32M, from group home, with PMHx of Autism Spectrum Disorder, Asthma, HLD, GERD, Hypothyroid, Impulse control disorder, Sun sensitivity, and BPH, who comes in with 2 week history of increasing L testicular swelling and pain. Admitted for testicular pain and swelling.  Us testes negative for torsion. significant for left intratesticular mass, plan for left radical orchiectomy 9/27/2022. Urology following.

## 2022-09-27 NOTE — DISCHARGE NOTE PROVIDER - ATTENDING DISCHARGE PHYSICAL EXAMINATION:
GENERAL: NAD, obese to overweight  EYES: clear conjunctiva; EOMI  ENMT: Moist mucous membranes  NECK: Supple, No JVD, Normal thyroid  CHEST/LUNG: Clear to auscultation bilaterally  HEART: S1, S2, Regular rate and rhythm  ABDOMEN: Soft, Nontender, Nondistended; Bowel sounds present  NEURO: Alert & awake  EXTREMITIES: No LE edema, no calf tenderness  LYMPH: No lymphadenopathy noted  SKIN: No rashes or lesions

## 2022-09-27 NOTE — PROGRESS NOTE ADULT - PROBLEM SELECTOR PLAN 4
P/w AST 61;   - No abdominal tenderness  - monitor CMP
no exacerbation  on home albuterol  c/w home meds

## 2022-09-27 NOTE — DISCHARGE NOTE PROVIDER - CARE PROVIDER_API CALL
Marco Paul)  Urology  [address], [address] [place] [address]  Phone: [phone]  Fax: [phone]  Follow Up Time: 1 week

## 2022-09-27 NOTE — DISCHARGE NOTE PROVIDER - NSDCCPCAREPLAN_GEN_ALL_CORE_FT
PRINCIPAL DISCHARGE DIAGNOSIS  Diagnosis: Mass of left testis  Assessment and Plan of Treatment: You presented with L testicle  swelling and pain. You were found to have Left tetis mass.   YOu underwent Left  rdical orchiectomy for left tesucular mass on 9/27. and urologist has cleared you for discharge.  Discharge Instructions for Penile Prosthesis:  Follow up: Please call Dr Menendez office to schedule a post-operative appointment at the office 1-2 weeks after surgery.   Discomfort: You may take pain medication that is given to you if you feel it is needed. You may have some intermittent pain for up to six (6) weeks post operatively. Pain does not signify any problem unless associated with fever, chills, or inability to void.  If you experience any fevers or chills please call immediately as this may be signs of an infection. No need for antibiotics after procedure. Can take tylenol and ibuprofen over the counter for pain. You are likely to have a sensation of pulling and discomfort on one or both sides that may last as long as several months. This is a natural sensation of healing.  Stitches: The stitches in the incision will dissolve by themselves. Sometimes skin stitches may open, allowing a slight gaping of the incision. This is no problem if you keep the area clean.  There are steristrips over the incision that will fall off on their own as the incision heals.   Swelling, Lumps and Bumps: There may be discomfort in the scrotum. This is of no concern and with time they will soften and disappear.  Any “black and blue” bruising areas will also resolve.  Normally, there is also swelling of the scrotum post operatively. Sometimes the tissue fluid which causes the swelling migrates to the penile skin and can look alarming; with time, all the swelling will eventually subside but may take weeks.  You may apply an ice-pack for 15 minutes out of every hour for the first 24 -36 hours. This can help with swelling.  Showers/Baths: Do not take showers until 48 hours after surgery. No bathing or swimming until your urologist tells you this is safe.   Return to Work: You      SECONDARY DISCHARGE DIAGNOSES  Diagnosis: Asthma  Assessment and Plan of Treatment: Continue with albuteral and follow up with your PCP

## 2022-09-27 NOTE — PROGRESS NOTE ADULT - PROBLEM SELECTOR PLAN 10
C/w home Buspirone, Valproic acid, Mirtazapine, Risperidone, Ativan  - Home Guanfacine 2mg daily will have to be ordered as freetext.
Home med Metformin  - c/w Sliding scale  - FS achs.  - FS q 6 while NPO

## 2022-09-27 NOTE — PROGRESS NOTE ADULT - PROBLEM SELECTOR PLAN 9
C/w home Pravastatin as Atorvastatin.
Home med Metformin  - c/w Sliding scale  - FS achs.  - FS q 6 while NPO

## 2022-09-28 ENCOUNTER — TRANSCRIPTION ENCOUNTER (OUTPATIENT)
Age: 32
End: 2022-09-28

## 2022-09-28 VITALS
DIASTOLIC BLOOD PRESSURE: 98 MMHG | SYSTOLIC BLOOD PRESSURE: 124 MMHG | RESPIRATION RATE: 17 BRPM | TEMPERATURE: 100 F | HEART RATE: 103 BPM | OXYGEN SATURATION: 95 %

## 2022-09-28 LAB
ANION GAP SERPL CALC-SCNC: 8 MMOL/L — SIGNIFICANT CHANGE UP (ref 5–17)
BUN SERPL-MCNC: 12 MG/DL — SIGNIFICANT CHANGE UP (ref 7–18)
CALCIUM SERPL-MCNC: 9.6 MG/DL — SIGNIFICANT CHANGE UP (ref 8.4–10.5)
CHLORIDE SERPL-SCNC: 104 MMOL/L — SIGNIFICANT CHANGE UP (ref 96–108)
CO2 SERPL-SCNC: 26 MMOL/L — SIGNIFICANT CHANGE UP (ref 22–31)
CREAT SERPL-MCNC: 1.05 MG/DL — SIGNIFICANT CHANGE UP (ref 0.5–1.3)
EGFR: 96 ML/MIN/1.73M2 — SIGNIFICANT CHANGE UP
GLUCOSE BLDC GLUCOMTR-MCNC: 116 MG/DL — HIGH (ref 70–99)
GLUCOSE BLDC GLUCOMTR-MCNC: 130 MG/DL — HIGH (ref 70–99)
GLUCOSE SERPL-MCNC: 109 MG/DL — HIGH (ref 70–99)
HCT VFR BLD CALC: 47.4 % — SIGNIFICANT CHANGE UP (ref 39–50)
HGB BLD-MCNC: 14.7 G/DL — SIGNIFICANT CHANGE UP (ref 13–17)
MCHC RBC-ENTMCNC: 25.1 PG — LOW (ref 27–34)
MCHC RBC-ENTMCNC: 31 GM/DL — LOW (ref 32–36)
MCV RBC AUTO: 80.9 FL — SIGNIFICANT CHANGE UP (ref 80–100)
NRBC # BLD: 0 /100 WBCS — SIGNIFICANT CHANGE UP (ref 0–0)
PLATELET # BLD AUTO: 152 K/UL — SIGNIFICANT CHANGE UP (ref 150–400)
POTASSIUM SERPL-MCNC: 4.5 MMOL/L — SIGNIFICANT CHANGE UP (ref 3.5–5.3)
POTASSIUM SERPL-SCNC: 4.5 MMOL/L — SIGNIFICANT CHANGE UP (ref 3.5–5.3)
RBC # BLD: 5.86 M/UL — HIGH (ref 4.2–5.8)
RBC # FLD: 14.6 % — HIGH (ref 10.3–14.5)
SODIUM SERPL-SCNC: 138 MMOL/L — SIGNIFICANT CHANGE UP (ref 135–145)
WBC # BLD: 8.13 K/UL — SIGNIFICANT CHANGE UP (ref 3.8–10.5)
WBC # FLD AUTO: 8.13 K/UL — SIGNIFICANT CHANGE UP (ref 3.8–10.5)

## 2022-09-28 PROCEDURE — 86900 BLOOD TYPING SEROLOGIC ABO: CPT

## 2022-09-28 PROCEDURE — 81003 URINALYSIS AUTO W/O SCOPE: CPT

## 2022-09-28 PROCEDURE — 99239 HOSP IP/OBS DSCHRG MGMT >30: CPT

## 2022-09-28 PROCEDURE — 84704 HCG FREE BETACHAIN TEST: CPT

## 2022-09-28 PROCEDURE — 83735 ASSAY OF MAGNESIUM: CPT

## 2022-09-28 PROCEDURE — 83615 LACTATE (LD) (LDH) ENZYME: CPT

## 2022-09-28 PROCEDURE — 80053 COMPREHEN METABOLIC PANEL: CPT

## 2022-09-28 PROCEDURE — 86850 RBC ANTIBODY SCREEN: CPT

## 2022-09-28 PROCEDURE — 96374 THER/PROPH/DIAG INJ IV PUSH: CPT

## 2022-09-28 PROCEDURE — 96372 THER/PROPH/DIAG INJ SC/IM: CPT | Mod: XU

## 2022-09-28 PROCEDURE — 87635 SARS-COV-2 COVID-19 AMP PRB: CPT

## 2022-09-28 PROCEDURE — 85025 COMPLETE CBC W/AUTO DIFF WBC: CPT

## 2022-09-28 PROCEDURE — 80048 BASIC METABOLIC PNL TOTAL CA: CPT

## 2022-09-28 PROCEDURE — 88309 TISSUE EXAM BY PATHOLOGIST: CPT

## 2022-09-28 PROCEDURE — 99285 EMERGENCY DEPT VISIT HI MDM: CPT | Mod: 25

## 2022-09-28 PROCEDURE — 82105 ALPHA-FETOPROTEIN SERUM: CPT

## 2022-09-28 PROCEDURE — 85610 PROTHROMBIN TIME: CPT

## 2022-09-28 PROCEDURE — 94640 AIRWAY INHALATION TREATMENT: CPT

## 2022-09-28 PROCEDURE — 87086 URINE CULTURE/COLONY COUNT: CPT

## 2022-09-28 PROCEDURE — 86901 BLOOD TYPING SEROLOGIC RH(D): CPT

## 2022-09-28 PROCEDURE — 76870 US EXAM SCROTUM: CPT

## 2022-09-28 PROCEDURE — 85027 COMPLETE CBC AUTOMATED: CPT

## 2022-09-28 PROCEDURE — 36415 COLL VENOUS BLD VENIPUNCTURE: CPT

## 2022-09-28 PROCEDURE — 74177 CT ABD & PELVIS W/CONTRAST: CPT | Mod: MA

## 2022-09-28 PROCEDURE — 82962 GLUCOSE BLOOD TEST: CPT

## 2022-09-28 PROCEDURE — 84100 ASSAY OF PHOSPHORUS: CPT

## 2022-09-28 RX ORDER — TAMSULOSIN HYDROCHLORIDE 0.4 MG/1
0.4 CAPSULE ORAL AT BEDTIME
Refills: 0 | Status: DISCONTINUED | OUTPATIENT
Start: 2022-09-28 | End: 2022-09-28

## 2022-09-28 RX ORDER — FERROUS SULFATE 325(65) MG
325 TABLET ORAL DAILY
Refills: 0 | Status: DISCONTINUED | OUTPATIENT
Start: 2022-09-28 | End: 2022-09-28

## 2022-09-28 RX ORDER — DIVALPROEX SODIUM 500 MG/1
500 TABLET, DELAYED RELEASE ORAL
Refills: 0 | Status: DISCONTINUED | OUTPATIENT
Start: 2022-09-28 | End: 2022-09-28

## 2022-09-28 RX ORDER — MIRTAZAPINE 45 MG/1
15 TABLET, ORALLY DISINTEGRATING ORAL AT BEDTIME
Refills: 0 | Status: DISCONTINUED | OUTPATIENT
Start: 2022-09-28 | End: 2022-09-28

## 2022-09-28 RX ORDER — RISPERIDONE 4 MG/1
4 TABLET ORAL
Refills: 0 | Status: DISCONTINUED | OUTPATIENT
Start: 2022-09-28 | End: 2022-09-28

## 2022-09-28 RX ORDER — INSULIN LISPRO 100/ML
VIAL (ML) SUBCUTANEOUS
Refills: 0 | Status: DISCONTINUED | OUTPATIENT
Start: 2022-09-28 | End: 2022-09-28

## 2022-09-28 RX ORDER — ONDANSETRON 8 MG/1
4 TABLET, FILM COATED ORAL EVERY 8 HOURS
Refills: 0 | Status: DISCONTINUED | OUTPATIENT
Start: 2022-09-28 | End: 2022-09-28

## 2022-09-28 RX ORDER — OXYCODONE AND ACETAMINOPHEN 5; 325 MG/1; MG/1
1 TABLET ORAL
Qty: 12 | Refills: 0
Start: 2022-09-28 | End: 2022-09-30

## 2022-09-28 RX ORDER — PANTOPRAZOLE SODIUM 20 MG/1
40 TABLET, DELAYED RELEASE ORAL
Refills: 0 | Status: DISCONTINUED | OUTPATIENT
Start: 2022-09-28 | End: 2022-09-28

## 2022-09-28 RX ORDER — LEVOTHYROXINE SODIUM 125 MCG
50 TABLET ORAL DAILY
Refills: 0 | Status: DISCONTINUED | OUTPATIENT
Start: 2022-09-28 | End: 2022-09-28

## 2022-09-28 RX ORDER — ATORVASTATIN CALCIUM 80 MG/1
10 TABLET, FILM COATED ORAL AT BEDTIME
Refills: 0 | Status: DISCONTINUED | OUTPATIENT
Start: 2022-09-28 | End: 2022-09-28

## 2022-09-28 RX ADMIN — OXYCODONE AND ACETAMINOPHEN 2 TABLET(S): 5; 325 TABLET ORAL at 10:43

## 2022-09-28 RX ADMIN — Medication 50 MICROGRAM(S): at 07:49

## 2022-09-28 RX ADMIN — OXYCODONE AND ACETAMINOPHEN 2 TABLET(S): 5; 325 TABLET ORAL at 11:30

## 2022-09-28 RX ADMIN — Medication 325 MILLIGRAM(S): at 11:16

## 2022-09-28 RX ADMIN — PANTOPRAZOLE SODIUM 40 MILLIGRAM(S): 20 TABLET, DELAYED RELEASE ORAL at 07:49

## 2022-09-28 NOTE — DISCHARGE NOTE NURSING/CASE MANAGEMENT/SOCIAL WORK - PATIENT PORTAL LINK FT
You can access the FollowMyHealth Patient Portal offered by Health system by registering at the following website: [email or web address]. By joining [facility]’s FollowMyHealth portal, you will also be able to view your health information using other applications (apps) compatible with our system.

## 2022-09-28 NOTE — DISCHARGE NOTE NURSING/CASE MANAGEMENT/SOCIAL WORK - NSDCPEFALRISK_GEN_ALL_CORE
For information on Fall & Injury Prevention, visit: [email or web address] OR  [email or web address] OR  https://www.cdc.gov/steadi/patient.html

## 2022-09-28 NOTE — DISCHARGE NOTE NURSING/CASE MANAGEMENT/SOCIAL WORK - NSDCVIVACCINE_GEN_ALL_CORE_FT
Tdap; 20-Dec-2018 12:21; Lo Anaya (RN); [employer]; [id number] (Exp. Date: 02-Nov-2020); IntraMuscular; Deltoid Left.; 0.5 milliLiter(s); VIS (VIS Published: 09-May-2013, VIS Presented: 20-Dec-2018);   Tdap; 26-Mar-2019 18:59; Shavon Barrios (RN); [employer]; [id number] (Exp. Date: 26-Feb-2021); IntraMuscular; Deltoid Left.; 0.5 milliLiter(s); VIS (VIS Published: 09-May-2013, VIS Presented: 26-Mar-2019);   Tdap; 01-Dec-2021 09:35; Zamzam Coulter (RN); [employer]; [id number] (Exp. Date: 09-Sep-2023); IntraMuscular; Deltoid Left.; 0.5 milliLiter(s); VIS (VIS Published: 09-May-2013, VIS Presented: 01-Dec-2021);   Tdap; 21-Jul-2022 01:28; Rose Hancock (RN); [employer]; [id number] (Exp. Date: 14-Oct-2024); IntraMuscular; Deltoid Right.; 0.5 milliLiter(s); VIS (VIS Published: 09-May-2013, VIS Presented: 21-Jul-2022);

## 2022-09-29 ENCOUNTER — EMERGENCY (EMERGENCY)
Facility: HOSPITAL | Age: 32
LOS: 1 days | Discharge: HOME | End: 2022-09-29
Attending: EMERGENCY MEDICINE
Payer: MEDICAID

## 2022-09-29 VITALS
TEMPERATURE: 100 F | HEIGHT: 71 IN | RESPIRATION RATE: 17 BRPM | HEART RATE: 94 BPM | WEIGHT: 229.94 LBS | SYSTOLIC BLOOD PRESSURE: 109 MMHG | OXYGEN SATURATION: 95 % | DIASTOLIC BLOOD PRESSURE: 82 MMHG

## 2022-09-29 LAB
ALBUMIN SERPL ELPH-MCNC: 3.3 G/DL — LOW (ref 3.5–5)
ALP SERPL-CCNC: 95 U/L — SIGNIFICANT CHANGE UP (ref 40–120)
ALT FLD-CCNC: 162 U/L DA — HIGH (ref 10–60)
ANION GAP SERPL CALC-SCNC: 4 MMOL/L — LOW (ref 5–17)
APTT BLD: 29.8 SEC — SIGNIFICANT CHANGE UP (ref 27.5–35.5)
AST SERPL-CCNC: 80 U/L — HIGH (ref 10–40)
BASOPHILS # BLD AUTO: 0.02 K/UL — SIGNIFICANT CHANGE UP (ref 0–0.2)
BASOPHILS NFR BLD AUTO: 0.3 % — SIGNIFICANT CHANGE UP (ref 0–2)
BILIRUB SERPL-MCNC: 0.5 MG/DL — SIGNIFICANT CHANGE UP (ref 0.2–1.2)
BUN SERPL-MCNC: 13 MG/DL — SIGNIFICANT CHANGE UP (ref 7–18)
CALCIUM SERPL-MCNC: 8.7 MG/DL — SIGNIFICANT CHANGE UP (ref 8.4–10.5)
CHLORIDE SERPL-SCNC: 105 MMOL/L — SIGNIFICANT CHANGE UP (ref 96–108)
CO2 SERPL-SCNC: 29 MMOL/L — SIGNIFICANT CHANGE UP (ref 22–31)
CREAT SERPL-MCNC: 0.96 MG/DL — SIGNIFICANT CHANGE UP (ref 0.5–1.3)
EGFR: 107 ML/MIN/1.73M2 — SIGNIFICANT CHANGE UP
EOSINOPHIL # BLD AUTO: 0.12 K/UL — SIGNIFICANT CHANGE UP (ref 0–0.5)
EOSINOPHIL NFR BLD AUTO: 1.8 % — SIGNIFICANT CHANGE UP (ref 0–6)
GLUCOSE SERPL-MCNC: 117 MG/DL — HIGH (ref 70–99)
HCT VFR BLD CALC: 43.1 % — SIGNIFICANT CHANGE UP (ref 39–50)
HGB BLD-MCNC: 13.5 G/DL — SIGNIFICANT CHANGE UP (ref 13–17)
IMM GRANULOCYTES NFR BLD AUTO: 0.9 % — SIGNIFICANT CHANGE UP (ref 0–0.9)
INR BLD: 0.99 RATIO — SIGNIFICANT CHANGE UP (ref 0.88–1.16)
LACTATE SERPL-SCNC: 1.5 MMOL/L — SIGNIFICANT CHANGE UP (ref 0.7–2)
LYMPHOCYTES # BLD AUTO: 1.76 K/UL — SIGNIFICANT CHANGE UP (ref 1–3.3)
LYMPHOCYTES # BLD AUTO: 27 % — SIGNIFICANT CHANGE UP (ref 13–44)
MCHC RBC-ENTMCNC: 25.6 PG — LOW (ref 27–34)
MCHC RBC-ENTMCNC: 31.3 GM/DL — LOW (ref 32–36)
MCV RBC AUTO: 81.6 FL — SIGNIFICANT CHANGE UP (ref 80–100)
MONOCYTES # BLD AUTO: 0.62 K/UL — SIGNIFICANT CHANGE UP (ref 0–0.9)
MONOCYTES NFR BLD AUTO: 9.5 % — SIGNIFICANT CHANGE UP (ref 2–14)
NEUTROPHILS # BLD AUTO: 3.93 K/UL — SIGNIFICANT CHANGE UP (ref 1.8–7.4)
NEUTROPHILS NFR BLD AUTO: 60.5 % — SIGNIFICANT CHANGE UP (ref 43–77)
NRBC # BLD: 0 /100 WBCS — SIGNIFICANT CHANGE UP (ref 0–0)
PLATELET # BLD AUTO: 142 K/UL — LOW (ref 150–400)
POTASSIUM SERPL-MCNC: 3.9 MMOL/L — SIGNIFICANT CHANGE UP (ref 3.5–5.3)
POTASSIUM SERPL-SCNC: 3.9 MMOL/L — SIGNIFICANT CHANGE UP (ref 3.5–5.3)
PROT SERPL-MCNC: 7.4 G/DL — SIGNIFICANT CHANGE UP (ref 6–8.3)
PROTHROM AB SERPL-ACNC: 11.8 SEC — SIGNIFICANT CHANGE UP (ref 10.5–13.4)
RBC # BLD: 5.28 M/UL — SIGNIFICANT CHANGE UP (ref 4.2–5.8)
RBC # FLD: 14.4 % — SIGNIFICANT CHANGE UP (ref 10.3–14.5)
SARS-COV-2 RNA SPEC QL NAA+PROBE: SIGNIFICANT CHANGE UP
SODIUM SERPL-SCNC: 138 MMOL/L — SIGNIFICANT CHANGE UP (ref 135–145)
WBC # BLD: 6.51 K/UL — SIGNIFICANT CHANGE UP (ref 3.8–10.5)
WBC # FLD AUTO: 6.51 K/UL — SIGNIFICANT CHANGE UP (ref 3.8–10.5)

## 2022-09-29 PROCEDURE — 76870 US EXAM SCROTUM: CPT | Mod: 26

## 2022-09-29 PROCEDURE — 99284 EMERGENCY DEPT VISIT MOD MDM: CPT

## 2022-09-29 RX ORDER — PIPERACILLIN AND TAZOBACTAM 4; .5 G/20ML; G/20ML
3.38 INJECTION, POWDER, LYOPHILIZED, FOR SOLUTION INTRAVENOUS ONCE
Refills: 0 | Status: COMPLETED | OUTPATIENT
Start: 2022-09-29 | End: 2022-09-29

## 2022-09-29 RX ORDER — ACETAMINOPHEN 500 MG
650 TABLET ORAL ONCE
Refills: 0 | Status: COMPLETED | OUTPATIENT
Start: 2022-09-29 | End: 2022-09-29

## 2022-09-29 NOTE — ED PROVIDER NOTE - PATIENT PORTAL LINK FT
You can access the FollowMyHealth Patient Portal offered by [facility] by registering at the following website: [email or web address]. By joining [employer]’s FollowMyHealth portal, you will also be able to view your health information using other applications (apps) compatible with our system.

## 2022-09-29 NOTE — ED PROVIDER NOTE - CLINICAL SUMMARY MEDICAL DECISION MAKING FREE TEXT BOX
32M, from group home, with PMHx of Autism Spectrum Disorder, Asthma, HLD, GERD, Hypothyroid, Impulse control disorder, Sun sensitivity, and BPH presents to ed c/o left groin swelling, pain and redness since post op orchiotomy for a mass with dr sinha 9/27/22. no bm since 6 days ago. urinating but dark. no n/v, low grade temp.    scrotal/groin abscess- sepsis work up, ct, sono, uro eval

## 2022-09-29 NOTE — ED PROVIDER NOTE - OBJECTIVE STATEMENT
32M, from group home, with PMHx of Autism Spectrum Disorder, Asthma, HLD, GERD, Hypothyroid, Impulse control disorder, Sun sensitivity, and BPH presents to ed c/o left groin swelling, pain and redness since post op orchiotomy for a mass with dr sinha 9/27/22. no bm since 6 days ago. urinating but dark. no n/v, low grade temp.

## 2022-09-29 NOTE — ED PROVIDER NOTE - PROGRESS NOTE DETAILS
Mega: pending labs and ct- s/o to dr redmond Armstrong: labs are unremarkable. ct with post surgical changes, but no abscess. spoke with urology dr sinha who did surgery and states these are expected changes post surgery. will dc with pain control. advised to rest, ice to area. f/u with urology. return precautions discussed.

## 2022-09-30 VITALS
OXYGEN SATURATION: 96 % | DIASTOLIC BLOOD PRESSURE: 79 MMHG | HEART RATE: 70 BPM | SYSTOLIC BLOOD PRESSURE: 158 MMHG | TEMPERATURE: 98 F | RESPIRATION RATE: 17 BRPM

## 2022-09-30 LAB
APPEARANCE UR: CLEAR — SIGNIFICANT CHANGE UP
BACTERIA # UR AUTO: ABNORMAL /HPF
BILIRUB UR-MCNC: NEGATIVE — SIGNIFICANT CHANGE UP
COD CRY URNS QL: ABNORMAL /HPF
COLOR SPEC: YELLOW — SIGNIFICANT CHANGE UP
COMMENT - URINE: SIGNIFICANT CHANGE UP
DIFF PNL FLD: NEGATIVE — SIGNIFICANT CHANGE UP
EPI CELLS # UR: SIGNIFICANT CHANGE UP /HPF
GLUCOSE UR QL: NEGATIVE — SIGNIFICANT CHANGE UP
HYALINE CASTS # UR AUTO: ABNORMAL /LPF
KETONES UR-MCNC: ABNORMAL
LEUKOCYTE ESTERASE UR-ACNC: NEGATIVE — SIGNIFICANT CHANGE UP
NITRITE UR-MCNC: NEGATIVE — SIGNIFICANT CHANGE UP
PH UR: 6 — SIGNIFICANT CHANGE UP (ref 5–8)
PROT UR-MCNC: 15
RBC CASTS # UR COMP ASSIST: SIGNIFICANT CHANGE UP /HPF (ref 0–2)
SP GR SPEC: 1.01 — SIGNIFICANT CHANGE UP (ref 1.01–1.02)
UROBILINOGEN FLD QL: 1
WBC UR QL: SIGNIFICANT CHANGE UP /HPF (ref 0–5)

## 2022-09-30 PROCEDURE — 85025 COMPLETE CBC W/AUTO DIFF WBC: CPT

## 2022-09-30 PROCEDURE — 36415 COLL VENOUS BLD VENIPUNCTURE: CPT

## 2022-09-30 PROCEDURE — 74177 CT ABD & PELVIS W/CONTRAST: CPT | Mod: MA

## 2022-09-30 PROCEDURE — 87635 SARS-COV-2 COVID-19 AMP PRB: CPT

## 2022-09-30 PROCEDURE — 99285 EMERGENCY DEPT VISIT HI MDM: CPT | Mod: 25

## 2022-09-30 PROCEDURE — 83605 ASSAY OF LACTIC ACID: CPT

## 2022-09-30 PROCEDURE — 87040 BLOOD CULTURE FOR BACTERIA: CPT

## 2022-09-30 PROCEDURE — 87086 URINE CULTURE/COLONY COUNT: CPT

## 2022-09-30 PROCEDURE — 80053 COMPREHEN METABOLIC PANEL: CPT

## 2022-09-30 PROCEDURE — 85610 PROTHROMBIN TIME: CPT

## 2022-09-30 PROCEDURE — 85730 THROMBOPLASTIN TIME PARTIAL: CPT

## 2022-09-30 PROCEDURE — 81001 URINALYSIS AUTO W/SCOPE: CPT

## 2022-09-30 PROCEDURE — 76870 US EXAM SCROTUM: CPT

## 2022-09-30 PROCEDURE — 96374 THER/PROPH/DIAG INJ IV PUSH: CPT | Mod: XU

## 2022-09-30 PROCEDURE — 96375 TX/PRO/DX INJ NEW DRUG ADDON: CPT

## 2022-09-30 PROCEDURE — 74177 CT ABD & PELVIS W/CONTRAST: CPT | Mod: 26,MA

## 2022-09-30 RX ORDER — MORPHINE SULFATE 50 MG/1
4 CAPSULE, EXTENDED RELEASE ORAL ONCE
Refills: 0 | Status: DISCONTINUED | OUTPATIENT
Start: 2022-09-30 | End: 2022-09-30

## 2022-09-30 RX ORDER — IBUPROFEN 200 MG
1 TABLET ORAL
Qty: 20 | Refills: 0
Start: 2022-09-30 | End: 2022-10-04

## 2022-09-30 RX ORDER — OXYCODONE HYDROCHLORIDE 5 MG/1
1 TABLET ORAL
Qty: 12 | Refills: 0
Start: 2022-09-30 | End: 2022-10-02

## 2022-09-30 RX ADMIN — MORPHINE SULFATE 4 MILLIGRAM(S): 50 CAPSULE, EXTENDED RELEASE ORAL at 01:15

## 2022-09-30 RX ADMIN — PIPERACILLIN AND TAZOBACTAM 200 GRAM(S): 4; .5 INJECTION, POWDER, LYOPHILIZED, FOR SOLUTION INTRAVENOUS at 04:22

## 2022-09-30 RX ADMIN — Medication 650 MILLIGRAM(S): at 04:22

## 2022-09-30 RX ADMIN — MORPHINE SULFATE 4 MILLIGRAM(S): 50 CAPSULE, EXTENDED RELEASE ORAL at 04:22

## 2022-09-30 NOTE — ED ADULT NURSE NOTE - NSIMPLEMENTINTERV_GEN_ALL_ED
Implemented All Universal Safety Interventions:  [place] to call system. Call bell, personal items and telephone within reach. Instruct patient to call for assistance. Room bathroom lighting operational. Non-slip footwear when patient is off stretcher. Physically safe environment: no spills, clutter or unnecessary equipment. Stretcher in lowest position, wheels locked, appropriate side rails in place.

## 2022-10-01 LAB
CULTURE RESULTS: NO GROWTH — SIGNIFICANT CHANGE UP
SPECIMEN SOURCE: SIGNIFICANT CHANGE UP

## 2022-10-04 LAB — SURGICAL PATHOLOGY STUDY: SIGNIFICANT CHANGE UP

## 2022-10-05 ENCOUNTER — EMERGENCY (EMERGENCY)
Facility: HOSPITAL | Age: 32
LOS: 1 days | Discharge: HOME | End: 2022-10-05
Attending: EMERGENCY MEDICINE
Payer: MEDICAID

## 2022-10-05 VITALS
SYSTOLIC BLOOD PRESSURE: 127 MMHG | OXYGEN SATURATION: 97 % | HEART RATE: 81 BPM | WEIGHT: 307.1 LBS | HEIGHT: 71 IN | RESPIRATION RATE: 16 BRPM | DIASTOLIC BLOOD PRESSURE: 84 MMHG | TEMPERATURE: 98 F

## 2022-10-05 LAB
CULTURE RESULTS: SIGNIFICANT CHANGE UP
CULTURE RESULTS: SIGNIFICANT CHANGE UP
SARS-COV-2 RNA SPEC QL NAA+PROBE: SIGNIFICANT CHANGE UP
SPECIMEN SOURCE: SIGNIFICANT CHANGE UP
SPECIMEN SOURCE: SIGNIFICANT CHANGE UP

## 2022-10-05 PROCEDURE — 99284 EMERGENCY DEPT VISIT MOD MDM: CPT

## 2022-10-05 RX ORDER — MORPHINE SULFATE 50 MG/1
2 CAPSULE, EXTENDED RELEASE ORAL ONCE
Refills: 0 | Status: DISCONTINUED | OUTPATIENT
Start: 2022-10-05 | End: 2022-10-05

## 2022-10-05 RX ORDER — SODIUM CHLORIDE 9 MG/ML
1000 INJECTION INTRAMUSCULAR; INTRAVENOUS; SUBCUTANEOUS ONCE
Refills: 0 | Status: COMPLETED | OUTPATIENT
Start: 2022-10-05 | End: 2022-10-05

## 2022-10-05 RX ADMIN — SODIUM CHLORIDE 1000 MILLILITER(S): 9 INJECTION INTRAMUSCULAR; INTRAVENOUS; SUBCUTANEOUS at 23:20

## 2022-10-05 RX ADMIN — MORPHINE SULFATE 2 MILLIGRAM(S): 50 CAPSULE, EXTENDED RELEASE ORAL at 23:43

## 2022-10-05 NOTE — ED PROVIDER NOTE - HIV OFFER
Impression: SEVERE PDR  [id number]. [facility] ANALOGY Plan: Hx: Kalee Baptiste --LOST to f/u w PANDEMIC  -- KIND, INFORMED and [address] but in bad situation for BG w no care . MUST SEE PCP --  REFERRAL TO PCP IMMEDIATELY  gvien -- HI RISK RENAL FAILURE, MI, CVA, LOV, DEATH if NOT IMPRVED. Confirmed SEVERE DM retinopathy. Dz out of control. Non-comply to DM care for long time has lead to this point. Much dmg permnt. Pt will lose more VA / may have perm. blindness DESPITE best efforts, especially if not attention, compliance, improved care of the system. dz. LENGHTY d/w pt. Understood. RETINA will provide vigilant attention but prognosis guarded. [facility] ANALOGY.]]     TODAY reviewed w pt the imperative to control BG and work w PCP/DM care Previously Negative (within the last year)

## 2022-10-05 NOTE — ED PROVIDER NOTE - PHYSICAL EXAMINATION
General: Well appearing, no acute distress, appears stated age  HEENT: normocephalic, atraumatic   Respiratory: normal work of breathing  MSK: no swelling or tenderness of lower extremities, moving all extremities spontaneously   : tenderness and swelling at groin incision site, no blood or pus  Skin: warm, dry  Neuro: A&Ox3, cranial nerves II-XII intact, 5/5 strength in all extremities, no sensory deficits, normal gait   Psych: appropriate affect

## 2022-10-05 NOTE — ED ADULT NURSE NOTE - OBJECTIVE STATEMENT
Pt c/o pain in the L groin, pt is s/p left testical removal about a week ago. Mentioned pain today, took ibuprofen it didn't help. No swelling, redness or discharge noted to surgical site

## 2022-10-05 NOTE — ED ADULT NURSE NOTE - NSIMPLEMENTINTERV_GEN_ALL_ED
Implemented All Universal Safety Interventions:  [place] to call system. Call bell, personal items and telephone within reach. Instruct patient to call for assistance. Room bathroom lighting operational. Non-slip footwear when patient is off stretcher. Physically safe environment: no spills, clutter or unnecessary equipment. Stretcher in lowest position, wheels locked, appropriate side rails in place.

## 2022-10-05 NOTE — ED PROVIDER NOTE - OBJECTIVE STATEMENT
32M, from group home, with PMHx of Autism Spectrum Disorder, Asthma, HLD, GERD, Hypothyroid, Impulse control disorder, Sun sensitivity, Can presents with swelling and his incision site.  Patient had orchiectomy on September 27 because of a concern for malignancy.  He presented on September 29 for pain and swelling at his incision site.  And he is back again today.  He denies fevers chills vomiting.

## 2022-10-05 NOTE — ED PROVIDER NOTE - PATIENT PORTAL LINK FT
You can access the FollowMyHealth Patient Portal offered by [facility] by registering at the following website: [email or web address]. By joining [facility]’s FollowMyHealth portal, you will also be able to view your health information using other applications (apps) compatible with our system.

## 2022-10-05 NOTE — ED PROVIDER NOTE - PROGRESS NOTE DETAILS
Patricai: labs are unremarkable. ct improved from prior. surgical site is well appearing, minimally ttp, no discharge. pt already saw urology 2 days ago and was told area was healing well. has appt next week again. advised to continue taking home meds. return precautions discussed.

## 2022-10-06 ENCOUNTER — OUTPATIENT (OUTPATIENT)
Dept: OUTPATIENT SERVICES | Facility: HOSPITAL | Age: 32
LOS: 1 days | Discharge: HOME | End: 2022-10-06

## 2022-10-06 VITALS
HEART RATE: 67 BPM | TEMPERATURE: 98 F | OXYGEN SATURATION: 98 % | RESPIRATION RATE: 16 BRPM | SYSTOLIC BLOOD PRESSURE: 117 MMHG | DIASTOLIC BLOOD PRESSURE: 67 MMHG

## 2022-10-06 DIAGNOSIS — C62.90 MALIGNANT NEOPLASM OF UNSPECIFIED TESTIS, UNSPECIFIED WHETHER DESCENDED OR UNDESCENDED: ICD-10-CM

## 2022-10-06 LAB
ALBUMIN SERPL ELPH-MCNC: 3.2 G/DL — LOW (ref 3.5–5)
ALP SERPL-CCNC: 107 U/L — SIGNIFICANT CHANGE UP (ref 40–120)
ALT FLD-CCNC: 118 U/L DA — HIGH (ref 10–60)
ANION GAP SERPL CALC-SCNC: 6 MMOL/L — SIGNIFICANT CHANGE UP (ref 5–17)
AST SERPL-CCNC: 48 U/L — HIGH (ref 10–40)
BASOPHILS # BLD AUTO: 0.04 K/UL — SIGNIFICANT CHANGE UP (ref 0–0.2)
BASOPHILS NFR BLD AUTO: 0.6 % — SIGNIFICANT CHANGE UP (ref 0–2)
BILIRUB SERPL-MCNC: 0.3 MG/DL — SIGNIFICANT CHANGE UP (ref 0.2–1.2)
BUN SERPL-MCNC: 18 MG/DL — SIGNIFICANT CHANGE UP (ref 7–18)
CALCIUM SERPL-MCNC: 9 MG/DL — SIGNIFICANT CHANGE UP (ref 8.4–10.5)
CHLORIDE SERPL-SCNC: 109 MMOL/L — HIGH (ref 96–108)
CO2 SERPL-SCNC: 24 MMOL/L — SIGNIFICANT CHANGE UP (ref 22–31)
CREAT SERPL-MCNC: 0.98 MG/DL — SIGNIFICANT CHANGE UP (ref 0.5–1.3)
EGFR: 104 ML/MIN/1.73M2 — SIGNIFICANT CHANGE UP
EOSINOPHIL # BLD AUTO: 0.16 K/UL — SIGNIFICANT CHANGE UP (ref 0–0.5)
EOSINOPHIL NFR BLD AUTO: 2.6 % — SIGNIFICANT CHANGE UP (ref 0–6)
GLUCOSE SERPL-MCNC: 109 MG/DL — HIGH (ref 70–99)
HCT VFR BLD CALC: 42.1 % — SIGNIFICANT CHANGE UP (ref 39–50)
HGB BLD-MCNC: 13.2 G/DL — SIGNIFICANT CHANGE UP (ref 13–17)
IMM GRANULOCYTES NFR BLD AUTO: 0.8 % — SIGNIFICANT CHANGE UP (ref 0–0.9)
LACTATE SERPL-SCNC: 1.6 MMOL/L — SIGNIFICANT CHANGE UP (ref 0.7–2)
LIDOCAIN IGE QN: 231 U/L — SIGNIFICANT CHANGE UP (ref 73–393)
LYMPHOCYTES # BLD AUTO: 3.11 K/UL — SIGNIFICANT CHANGE UP (ref 1–3.3)
LYMPHOCYTES # BLD AUTO: 49.7 % — HIGH (ref 13–44)
MCHC RBC-ENTMCNC: 25.3 PG — LOW (ref 27–34)
MCHC RBC-ENTMCNC: 31.4 GM/DL — LOW (ref 32–36)
MCV RBC AUTO: 80.8 FL — SIGNIFICANT CHANGE UP (ref 80–100)
MONOCYTES # BLD AUTO: 0.54 K/UL — SIGNIFICANT CHANGE UP (ref 0–0.9)
MONOCYTES NFR BLD AUTO: 8.6 % — SIGNIFICANT CHANGE UP (ref 2–14)
NEUTROPHILS # BLD AUTO: 2.36 K/UL — SIGNIFICANT CHANGE UP (ref 1.8–7.4)
NEUTROPHILS NFR BLD AUTO: 37.7 % — LOW (ref 43–77)
NRBC # BLD: 0 /100 WBCS — SIGNIFICANT CHANGE UP (ref 0–0)
PLATELET # BLD AUTO: 202 K/UL — SIGNIFICANT CHANGE UP (ref 150–400)
POTASSIUM SERPL-MCNC: 4.9 MMOL/L — SIGNIFICANT CHANGE UP (ref 3.5–5.3)
POTASSIUM SERPL-SCNC: 4.9 MMOL/L — SIGNIFICANT CHANGE UP (ref 3.5–5.3)
PROT SERPL-MCNC: 6.6 G/DL — SIGNIFICANT CHANGE UP (ref 6–8.3)
RBC # BLD: 5.21 M/UL — SIGNIFICANT CHANGE UP (ref 4.2–5.8)
RBC # FLD: 13.9 % — SIGNIFICANT CHANGE UP (ref 10.3–14.5)
SODIUM SERPL-SCNC: 139 MMOL/L — SIGNIFICANT CHANGE UP (ref 135–145)
WBC # BLD: 6.26 K/UL — SIGNIFICANT CHANGE UP (ref 3.8–10.5)
WBC # FLD AUTO: 6.26 K/UL — SIGNIFICANT CHANGE UP (ref 3.8–10.5)

## 2022-10-06 PROCEDURE — 99284 EMERGENCY DEPT VISIT MOD MDM: CPT | Mod: 25

## 2022-10-06 PROCEDURE — 85025 COMPLETE CBC W/AUTO DIFF WBC: CPT

## 2022-10-06 PROCEDURE — 96372 THER/PROPH/DIAG INJ SC/IM: CPT

## 2022-10-06 PROCEDURE — 87635 SARS-COV-2 COVID-19 AMP PRB: CPT

## 2022-10-06 PROCEDURE — 83605 ASSAY OF LACTIC ACID: CPT

## 2022-10-06 PROCEDURE — 74177 CT ABD & PELVIS W/CONTRAST: CPT | Mod: 26,MA

## 2022-10-06 PROCEDURE — 83690 ASSAY OF LIPASE: CPT

## 2022-10-06 PROCEDURE — 74177 CT ABD & PELVIS W/CONTRAST: CPT | Mod: MA

## 2022-10-06 PROCEDURE — 80053 COMPREHEN METABOLIC PANEL: CPT

## 2022-10-06 PROCEDURE — 36415 COLL VENOUS BLD VENIPUNCTURE: CPT

## 2022-10-06 RX ORDER — LEVOTHYROXINE SODIUM 0.05 MG/1
50 TABLET ORAL DAILY
Refills: 0 | Status: ACTIVE | COMMUNITY

## 2022-10-06 RX ORDER — CHROMIUM 200 MCG
TABLET ORAL
Refills: 0 | Status: DISCONTINUED | COMMUNITY
End: 2022-10-06

## 2022-10-06 RX ORDER — MIRTAZAPINE 15 MG/1
15 TABLET, FILM COATED ORAL
Refills: 0 | Status: ACTIVE | COMMUNITY

## 2022-10-06 RX ORDER — METFORMIN HYDROCHLORIDE 500 MG/1
500 TABLET, COATED ORAL
Qty: 180 | Refills: 3 | Status: ACTIVE | COMMUNITY

## 2022-10-06 RX ORDER — BENZTROPINE MESYLATE 2 MG/1
TABLET ORAL
Refills: 0 | Status: DISCONTINUED | COMMUNITY
End: 2022-10-06

## 2022-10-06 RX ORDER — SENNOSIDES 8.6 MG TABLETS 8.6 MG/1
8.6 TABLET ORAL
Refills: 0 | Status: ACTIVE | COMMUNITY

## 2022-10-06 RX ORDER — FLUTICASONE PROPIONATE AND SALMETEROL 500; 50 UG/1; UG/1
POWDER RESPIRATORY (INHALATION)
Refills: 0 | Status: DISCONTINUED | COMMUNITY
End: 2022-10-06

## 2022-10-06 RX ORDER — SERTRALINE 25 MG/1
TABLET, FILM COATED ORAL
Refills: 0 | Status: DISCONTINUED | COMMUNITY
End: 2022-10-06

## 2022-10-06 RX ORDER — DIVALPROEX SODIUM 500 MG/1
TABLET, DELAYED RELEASE ORAL TWICE DAILY
Refills: 0 | Status: ACTIVE | COMMUNITY

## 2022-10-06 RX ORDER — HALOPERIDOL 5 MG/1
TABLET ORAL
Refills: 0 | Status: DISCONTINUED | COMMUNITY
End: 2022-10-06

## 2022-10-06 RX ORDER — LORAZEPAM 2 MG/1
TABLET ORAL
Refills: 0 | Status: DISCONTINUED | COMMUNITY
End: 2022-10-06

## 2022-10-06 RX ORDER — SULFAMETHOXAZOLE AND TRIMETHOPRIM 400; 80 MG/1; MG/1
TABLET ORAL
Refills: 0 | Status: DISCONTINUED | COMMUNITY
End: 2022-10-06

## 2022-10-06 RX ORDER — ALBUTEROL SULFATE 2.5 MG/.5ML
SOLUTION RESPIRATORY (INHALATION)
Refills: 0 | Status: DISCONTINUED | COMMUNITY
End: 2022-10-06

## 2022-10-06 RX ORDER — POLYETHYLENE GLYCOL 3350 17 G/17G
POWDER, FOR SOLUTION ORAL
Refills: 0 | Status: DISCONTINUED | COMMUNITY
End: 2022-10-06

## 2022-10-06 RX ORDER — CHLORHEXIDINE GLUCONATE 4 %
325 (65 FE) LIQUID (ML) TOPICAL
Refills: 0 | Status: ACTIVE | COMMUNITY

## 2022-10-06 RX ORDER — CLONIDINE HYDROCHLORIDE 0.3 MG/1
TABLET ORAL
Refills: 0 | Status: DISCONTINUED | COMMUNITY
End: 2022-10-06

## 2022-10-06 RX ORDER — WHITE PETROLATUM 1.75 OZ
OINTMENT TOPICAL
Refills: 0 | Status: DISCONTINUED | COMMUNITY
End: 2022-10-06

## 2022-10-06 RX ORDER — OMEPRAZOLE 20 MG/1
TABLET, DELAYED RELEASE ORAL
Refills: 0 | Status: ACTIVE | COMMUNITY

## 2022-10-06 RX ORDER — ALBUTEROL SULFATE 2.5 MG/.5ML
SOLUTION RESPIRATORY (INHALATION)
Refills: 0 | Status: ACTIVE | COMMUNITY

## 2022-10-06 RX ORDER — FLUOXETINE HYDROCHLORIDE 60 MG/1
TABLET ORAL
Refills: 0 | Status: DISCONTINUED | COMMUNITY
End: 2022-10-06

## 2022-10-06 RX ORDER — TAMSULOSIN HYDROCHLORIDE 0.4 MG/1
0.4 CAPSULE ORAL
Qty: 90 | Refills: 1 | Status: ACTIVE | COMMUNITY

## 2022-10-06 RX ORDER — GUANFACINE 2 MG/1
2 TABLET, EXTENDED RELEASE ORAL
Refills: 0 | Status: ACTIVE | COMMUNITY

## 2022-10-06 RX ORDER — TRIAMCINOLONE ACETONIDE 1 MG/G
0.1 CREAM TOPICAL
Refills: 0 | Status: DISCONTINUED | COMMUNITY
End: 2022-10-06

## 2022-10-06 RX ADMIN — MORPHINE SULFATE 2 MILLIGRAM(S): 50 CAPSULE, EXTENDED RELEASE ORAL at 01:18

## 2022-10-07 ENCOUNTER — RESULT REVIEW (OUTPATIENT)
Age: 32
End: 2022-10-07

## 2022-10-07 ENCOUNTER — APPOINTMENT (OUTPATIENT)
Dept: HEMATOLOGY ONCOLOGY | Facility: CLINIC | Age: 32
End: 2022-10-07

## 2022-10-07 VITALS
HEART RATE: 68 BPM | RESPIRATION RATE: 18 BRPM | DIASTOLIC BLOOD PRESSURE: 92 MMHG | SYSTOLIC BLOOD PRESSURE: 124 MMHG | HEIGHT: 70 IN | OXYGEN SATURATION: 96 % | BODY MASS INDEX: 42.92 KG/M2 | WEIGHT: 299.82 LBS | TEMPERATURE: 97.2 F

## 2022-10-07 DIAGNOSIS — Z86.39 PERSONAL HISTORY OF OTHER ENDOCRINE, NUTRITIONAL AND METABOLIC DISEASE: ICD-10-CM

## 2022-10-07 DIAGNOSIS — Z86.59 PERSONAL HISTORY OF OTHER MENTAL AND BEHAVIORAL DISORDERS: ICD-10-CM

## 2022-10-07 LAB
AFP-TM SERPL-MCNC: 3.6 NG/ML
BASOPHILS # BLD AUTO: 0.03 K/UL — SIGNIFICANT CHANGE UP (ref 0–0.2)
BASOPHILS NFR BLD AUTO: 0.5 % — SIGNIFICANT CHANGE UP (ref 0–2)
EOSINOPHIL # BLD AUTO: 0.16 K/UL — SIGNIFICANT CHANGE UP (ref 0–0.5)
EOSINOPHIL NFR BLD AUTO: 2.7 % — SIGNIFICANT CHANGE UP (ref 0–6)
HCT VFR BLD CALC: 43.1 % — SIGNIFICANT CHANGE UP (ref 39–50)
HGB BLD-MCNC: 13.6 G/DL — SIGNIFICANT CHANGE UP (ref 13–17)
IMM GRANULOCYTES NFR BLD AUTO: 1.8 % — HIGH (ref 0–0.9)
LYMPHOCYTES # BLD AUTO: 2.55 K/UL — SIGNIFICANT CHANGE UP (ref 1–3.3)
LYMPHOCYTES # BLD AUTO: 42.8 % — SIGNIFICANT CHANGE UP (ref 13–44)
MCHC RBC-ENTMCNC: 25.4 PG — LOW (ref 27–34)
MCHC RBC-ENTMCNC: 31.6 G/DL — LOW (ref 32–36)
MCV RBC AUTO: 80.6 FL — SIGNIFICANT CHANGE UP (ref 80–100)
MONOCYTES # BLD AUTO: 0.42 K/UL — SIGNIFICANT CHANGE UP (ref 0–0.9)
MONOCYTES NFR BLD AUTO: 7 % — SIGNIFICANT CHANGE UP (ref 2–14)
NEUTROPHILS # BLD AUTO: 2.69 K/UL — SIGNIFICANT CHANGE UP (ref 1.8–7.4)
NEUTROPHILS NFR BLD AUTO: 45.2 % — SIGNIFICANT CHANGE UP (ref 43–77)
NRBC # BLD: 0 /100 WBCS — SIGNIFICANT CHANGE UP (ref 0–0)
PLATELET # BLD AUTO: 258 K/UL — SIGNIFICANT CHANGE UP (ref 150–400)
RBC # BLD: 5.35 M/UL — SIGNIFICANT CHANGE UP (ref 4.2–5.8)
RBC # FLD: 14.1 % — SIGNIFICANT CHANGE UP (ref 10.3–14.5)
WBC # BLD: 5.96 K/UL — SIGNIFICANT CHANGE UP (ref 3.8–10.5)
WBC # FLD AUTO: 5.96 K/UL — SIGNIFICANT CHANGE UP (ref 3.8–10.5)

## 2022-10-07 PROCEDURE — 99205 OFFICE O/P NEW HI 60 MIN: CPT

## 2022-10-08 ENCOUNTER — EMERGENCY (EMERGENCY)
Facility: HOSPITAL | Age: 32
LOS: 1 days | Discharge: HOME | End: 2022-10-08
Attending: EMERGENCY MEDICINE
Payer: MEDICAID

## 2022-10-08 VITALS
HEIGHT: 71 IN | TEMPERATURE: 98 F | HEART RATE: 76 BPM | SYSTOLIC BLOOD PRESSURE: 100 MMHG | RESPIRATION RATE: 18 BRPM | DIASTOLIC BLOOD PRESSURE: 63 MMHG | OXYGEN SATURATION: 97 %

## 2022-10-08 PROCEDURE — 99284 EMERGENCY DEPT VISIT MOD MDM: CPT

## 2022-10-09 LAB
HCG SERPL-MCNC: <1 MIU/ML
LDH SERPL-CCNC: 325 U/L

## 2022-10-09 PROCEDURE — 99283 EMERGENCY DEPT VISIT LOW MDM: CPT

## 2022-10-09 RX ORDER — ACETAMINOPHEN 500 MG
650 TABLET ORAL ONCE
Refills: 0 | Status: DISCONTINUED | OUTPATIENT
Start: 2022-10-09 | End: 2022-10-09

## 2022-10-09 RX ORDER — IBUPROFEN 200 MG
600 TABLET ORAL ONCE
Refills: 0 | Status: COMPLETED | OUTPATIENT
Start: 2022-10-09 | End: 2022-10-09

## 2022-10-09 RX ADMIN — Medication 600 MILLIGRAM(S): at 02:40

## 2022-10-09 NOTE — ED PROVIDER NOTE - PHYSICAL EXAMINATION
GENERAL: well appearing, no acute distress   HEAD: atraumatic   EYES: EOMI   ENT: moist oral mucosa   CARDIAC: regular rate  RESPIRATORY: no increased work of breathing   MUSCULOSKELETAL: no deformity   NEUROLOGICAL: alert, spontaneous movement of extremities   SKIN: Left lower quadrant/inguinal surgical wound that is healing and without signs of infection or dehiscence, no drainage  PSYCHIATRIC: cooperative

## 2022-10-09 NOTE — ED PROVIDER NOTE - PATIENT PORTAL LINK FT
You can access the FollowMyHealth Patient Portal offered by [facility] by registering at the following website: [email or web address]. By joining [email or web address]’s FollowMyHealth portal, you will also be able to view your health information using other applications (apps) compatible with our system.

## 2022-10-09 NOTE — ED PROVIDER NOTE - CLINICAL SUMMARY MEDICAL DECISION MAKING FREE TEXT BOX
32-year-old male with pain at his surgical site prior to arrival, but currently without acute complaints.  Staff member at bedside states he is knowledge administer medications so is requesting patient's usual dose of Tylenol prior to leaving.  Discussed indication for patient return to ED.  Patient and staff member understood.

## 2022-10-09 NOTE — ED PROVIDER NOTE - OBJECTIVE STATEMENT
32-year-old male well-known to emergency department with recent left-sided orchiectomy presents for evaluation of surgical wound.  Patient apparently told group home staff that it was hurting him, but during my examination he says he feels fine and would like to leave.  Denies other acute complaints.

## 2022-10-12 NOTE — ED ADULT NURSE NOTE - OBJECTIVE STATEMENT
Plan: 3 Step peel in 6 weeks 30 year old male PMH asthma, factitious disorder, GERD, hyperthyroid, MR coming in with right thumb pain. pt states broke this thumb recently by punching a wall and had a cast placed this morning at [facility] but states they didn't send him home with any pain medication and he is still having pain in his thumb. states no swelling, no tingling, no numbness, no color change. also states earlier today had mild abd pain and 5 episodes of nonbloody diarrhea after eating chinese food but states no abdominal pain at this time. Detail Level: Zone Initiate Treatment: Hydrocortisone 2.5% BID after cleaning face for 5 days to avoid any PIH Continue Regimen: With ZO products

## 2022-10-13 NOTE — HISTORY OF PRESENT ILLNESS
[Disease: _____________________] : Disease: [unfilled] [T: ___] : T[unfilled] [N: ___] : N[unfilled] [M: ___] : M[unfilled] [AJCC Stage: ____] : AJCC Stage: [unfilled] [de-identified] : Markus Bazan 32 years old male with history of autism who initially presented left testicle pain and swelling on April 2022, US testicle showed a 2.7cm mass in the left testicle\par again on 9/26 on ED and admitted for left testicle pain and swelling \par \par US showed 4.0cm lesion\par CT abdomen and pelvis post orchiectomy showed postsurgical changes\par \par 9/25  hCG<1, AFP 3.3 and \par \par 9/27 left radical  orchiectomy, pathology showed seminoma, tumor invades rete testis, margins negative, LVI negative, pT1b tumor limited to the testis, pT1b\par \par Report burning sensation in suture site, otherwise feels fine.   [de-identified] : seminoma

## 2022-10-13 NOTE — CONSULT LETTER
[Dear  ___] : Dear  [unfilled], [Consult Letter:] : I had the pleasure of evaluating your patient, [unfilled]. [Please see my note below.] : Please see my note below. [Consult Closing:] : Thank you very much for allowing me to participate in the care of this patient.  If you have any questions, please do not hesitate to contact me. [Sincerely,] : Sincerely, [FreeTextEntry3] : Gabi Watson MD

## 2022-10-13 NOTE — HISTORY OF PRESENT ILLNESS
[Disease: _____________________] : Disease: [unfilled] [T: ___] : T[unfilled] [N: ___] : N[unfilled] [M: ___] : M[unfilled] [AJCC Stage: ____] : AJCC Stage: [unfilled] [de-identified] : Markus Bazan 32 years old male with history of autism who initially presented left testicle pain and swelling on April 2022, US testicle showed a 2.7cm mass in the left testicle\par again on 9/26 on ED and admitted for left testicle pain and swelling \par \par US showed 4.0cm lesion\par CT abdomen and pelvis post orchiectomy showed postsurgical changes\par \par 9/25  hCG<1, AFP 3.3 and \par \par 9/27 left radical  orchiectomy, pathology showed seminoma, tumor invades rete testis, margins negative, LVI negative, pT1b tumor limited to the testis, pT1b\par \par Report burning sensation in suture site, otherwise feels fine.   [de-identified] : seminoma

## 2022-10-13 NOTE — ASSESSMENT
[FreeTextEntry1] : Markus Romero Jr is a 32 years old male with history of autism who has newly diagnosed stage Ia seminoma \par \par \par I had a lengthy discussion with the patient and his mother regarding his testicular cancer status and further management.  He has stage Ia seminoma. Although his only LDH was 308, hCG and AFP were normal. LDH is nonspecific. NCCN also mentioned decisions regarding treatment should not be made on mildly elevated less than 3 upper limited of normal LDH alone. His LDH will be followed. He has stage Ia seminoma. The recurrence in 5 years is about 10% to 15%. If he is treated with radiation vs one or two doses of carboplatin auc 7, overtreatment is 85% to 90%.  He will be under surveillance given salvage treatment about 100%. He will have follow up in one month for LDH level, then every 3 months in the first year, every 6 months in the year 2 and 3, annually in the year 4 and 5. Surveillance images every 6 months in the first two years, annually in the year 3, 4 and 5. His and his mother questions were answered in full to their satisfaction.

## 2022-10-13 NOTE — REVIEW OF SYSTEMS
[Wheezing] : wheezing [Joint Pain] : joint pain [Negative] : Allergic/Immunologic [Shortness Of Breath] : no shortness of breath [SOB on Exertion] : no shortness of breath during exertion [Abdominal Pain] : no abdominal pain [Vomiting] : no vomiting [Constipation] : no constipation [Diarrhea] : no diarrhea [Joint Stiffness] : no joint stiffness [Muscle Pain] : no muscle pain [Muscle Weakness] : no muscle weakness

## 2022-10-15 ENCOUNTER — EMERGENCY (EMERGENCY)
Facility: HOSPITAL | Age: 32
LOS: 1 days | Discharge: HOME | End: 2022-10-15
Attending: EMERGENCY MEDICINE
Payer: MEDICAID

## 2022-10-15 VITALS
HEIGHT: 71 IN | RESPIRATION RATE: 17 BRPM | HEART RATE: 86 BPM | DIASTOLIC BLOOD PRESSURE: 76 MMHG | WEIGHT: 307.1 LBS | TEMPERATURE: 98 F | OXYGEN SATURATION: 98 % | SYSTOLIC BLOOD PRESSURE: 108 MMHG

## 2022-10-15 PROCEDURE — 93010 ELECTROCARDIOGRAM REPORT: CPT

## 2022-10-15 PROCEDURE — 96372 THER/PROPH/DIAG INJ SC/IM: CPT

## 2022-10-15 PROCEDURE — 99284 EMERGENCY DEPT VISIT MOD MDM: CPT

## 2022-10-15 PROCEDURE — 93005 ELECTROCARDIOGRAM TRACING: CPT

## 2022-10-15 PROCEDURE — 99283 EMERGENCY DEPT VISIT LOW MDM: CPT | Mod: 25

## 2022-10-15 RX ORDER — KETOROLAC TROMETHAMINE 30 MG/ML
15 SYRINGE (ML) INJECTION ONCE
Refills: 0 | Status: DISCONTINUED | OUTPATIENT
Start: 2022-10-15 | End: 2022-10-15

## 2022-10-15 RX ORDER — LIDOCAINE 4 G/100G
1 CREAM TOPICAL ONCE
Refills: 0 | Status: COMPLETED | OUTPATIENT
Start: 2022-10-15 | End: 2022-10-15

## 2022-10-15 RX ADMIN — Medication 15 MILLIGRAM(S): at 15:23

## 2022-10-15 RX ADMIN — LIDOCAINE 1 PATCH: 4 CREAM TOPICAL at 15:24

## 2022-10-15 NOTE — ED PROVIDER NOTE - NS ED ROS FT
Pt denies fevers, chills  nausea, vomiting,   chest pain, palpitations  shortness of breath, orthopnea  abdominal pain, melena,   dysuria, hematuria   numbness, weakness, saddle anesthesia  enlarged lymph nodes

## 2022-10-15 NOTE — ED ADULT NURSE NOTE - CAS ELECT INFOMATION PROVIDED
wound care  instruction  given and explained  by the  provider dc pt home  ambulating  w/  lena/CHELLE instructions

## 2022-10-15 NOTE — ED PROVIDER NOTE - DOMESTIC TRAVEL HIGH RISK QUESTION
Stable. A1c at goal control today in the office. Patient was encouraged to continue with healthy lifestyle efforts. We will continue to follow lab work over time.
No

## 2022-10-15 NOTE — ED PROVIDER NOTE - PHYSICAL EXAMINATION
General: Well appearing, no acute distress, appears stated age  HEENT: normocephalic, atraumatic   Respiratory: normal work of breathing  MSK: no swelling or tenderness of lower extremities, moving all extremities spontaneously   Skin: warm, dry, incision clean dry intact no purulent drainage   Neuro: A&Ox3, cranial nerves II-XII intact, 5/5 strength in all extremities, no sensory deficits, normal gait   Psych: appropriate affect

## 2022-10-15 NOTE — ED ADULT NURSE NOTE - OBJECTIVE STATEMENT
31 y/o male  c/o  on  left groin area  s/p  surgery of  hernia.  wound is  clean  dry and no signs of any  redness,  no drainage and  healing  well.

## 2022-10-15 NOTE — ED PROVIDER NOTE - CLINICAL SUMMARY MEDICAL DECISION MAKING FREE TEXT BOX
Patient encouraged to take all the pain medicine that he is prescribed and add lidocaine patches to his prescription.  Incision looks much better than he has in the past no need to reimage.

## 2022-10-15 NOTE — ED PROVIDER NOTE - PATIENT PORTAL LINK FT
You can access the FollowMyHealth Patient Portal offered by [facility] by registering at the following website: [email or web address]. By joining [facility]’s FollowMyHealth portal, you will also be able to view your health information using other applications (apps) compatible with our system.

## 2022-10-15 NOTE — ED PROVIDER NOTE - OBJECTIVE STATEMENT
32-year-old male presents with groin pain at the site where he had his orchiectomy.  This is the fourth time he has been in the ER since his orchiectomy for pain at the wound site.  His incision looks much more soft and nontender than he did last time he was here.  There is no purulent drainage, no erythema, no fluctuance.  Patient has not been taking pain medicines he is allowed to his home.

## 2022-10-15 NOTE — ED PROVIDER NOTE - NSFOLLOWUPINSTRUCTIONS_ED_ALL_ED_FT
for your pain  1. Take naproxen 500 mg in the morning and night  2. Take tylenol every 6-8 hours  3. Put lidocaine patches once a day    the pain will get better with time, your wound looks very good today

## 2022-10-20 NOTE — ED PROVIDER NOTE - CROS ED ROS STATEMENT
Reviewed Dr. Oneil's note and instructions with pt, she v/u.     all other ROS negative except as per HPI

## 2022-10-28 ENCOUNTER — EMERGENCY (EMERGENCY)
Facility: HOSPITAL | Age: 32
LOS: 1 days | Discharge: HOME | End: 2022-10-28
Attending: EMERGENCY MEDICINE
Payer: MEDICAID

## 2022-10-28 VITALS
TEMPERATURE: 98 F | RESPIRATION RATE: 18 BRPM | HEART RATE: 80 BPM | SYSTOLIC BLOOD PRESSURE: 105 MMHG | DIASTOLIC BLOOD PRESSURE: 65 MMHG | OXYGEN SATURATION: 98 %

## 2022-10-28 VITALS
OXYGEN SATURATION: 97 % | TEMPERATURE: 98 F | HEART RATE: 102 BPM | WEIGHT: 307.1 LBS | RESPIRATION RATE: 18 BRPM | HEIGHT: 71 IN | DIASTOLIC BLOOD PRESSURE: 85 MMHG | SYSTOLIC BLOOD PRESSURE: 133 MMHG

## 2022-10-28 LAB
ANION GAP SERPL CALC-SCNC: 6 MMOL/L — SIGNIFICANT CHANGE UP (ref 5–17)
BASOPHILS # BLD AUTO: 0.04 K/UL — SIGNIFICANT CHANGE UP (ref 0–0.2)
BASOPHILS NFR BLD AUTO: 0.5 % — SIGNIFICANT CHANGE UP (ref 0–2)
BUN SERPL-MCNC: 15 MG/DL — SIGNIFICANT CHANGE UP (ref 7–18)
CALCIUM SERPL-MCNC: 9.3 MG/DL — SIGNIFICANT CHANGE UP (ref 8.4–10.5)
CHLORIDE SERPL-SCNC: 104 MMOL/L — SIGNIFICANT CHANGE UP (ref 96–108)
CO2 SERPL-SCNC: 28 MMOL/L — SIGNIFICANT CHANGE UP (ref 22–31)
CREAT SERPL-MCNC: 0.82 MG/DL — SIGNIFICANT CHANGE UP (ref 0.5–1.3)
EGFR: 119 ML/MIN/1.73M2 — SIGNIFICANT CHANGE UP
EOSINOPHIL # BLD AUTO: 0.27 K/UL — SIGNIFICANT CHANGE UP (ref 0–0.5)
EOSINOPHIL NFR BLD AUTO: 3.3 % — SIGNIFICANT CHANGE UP (ref 0–6)
GLUCOSE SERPL-MCNC: 130 MG/DL — HIGH (ref 70–99)
HCT VFR BLD CALC: 43.1 % — SIGNIFICANT CHANGE UP (ref 39–50)
HGB BLD-MCNC: 13.7 G/DL — SIGNIFICANT CHANGE UP (ref 13–17)
IMM GRANULOCYTES NFR BLD AUTO: 0.6 % — SIGNIFICANT CHANGE UP (ref 0–0.9)
LYMPHOCYTES # BLD AUTO: 2.79 K/UL — SIGNIFICANT CHANGE UP (ref 1–3.3)
LYMPHOCYTES # BLD AUTO: 33.7 % — SIGNIFICANT CHANGE UP (ref 13–44)
MCHC RBC-ENTMCNC: 25.2 PG — LOW (ref 27–34)
MCHC RBC-ENTMCNC: 31.8 GM/DL — LOW (ref 32–36)
MCV RBC AUTO: 79.4 FL — LOW (ref 80–100)
MONOCYTES # BLD AUTO: 0.82 K/UL — SIGNIFICANT CHANGE UP (ref 0–0.9)
MONOCYTES NFR BLD AUTO: 9.9 % — SIGNIFICANT CHANGE UP (ref 2–14)
NEUTROPHILS # BLD AUTO: 4.31 K/UL — SIGNIFICANT CHANGE UP (ref 1.8–7.4)
NEUTROPHILS NFR BLD AUTO: 52 % — SIGNIFICANT CHANGE UP (ref 43–77)
NRBC # BLD: 0 /100 WBCS — SIGNIFICANT CHANGE UP (ref 0–0)
OB PNL STL: POSITIVE
PLATELET # BLD AUTO: 150 K/UL — SIGNIFICANT CHANGE UP (ref 150–400)
POTASSIUM SERPL-MCNC: 4.1 MMOL/L — SIGNIFICANT CHANGE UP (ref 3.5–5.3)
POTASSIUM SERPL-SCNC: 4.1 MMOL/L — SIGNIFICANT CHANGE UP (ref 3.5–5.3)
RBC # BLD: 5.43 M/UL — SIGNIFICANT CHANGE UP (ref 4.2–5.8)
RBC # FLD: 14.2 % — SIGNIFICANT CHANGE UP (ref 10.3–14.5)
SODIUM SERPL-SCNC: 138 MMOL/L — SIGNIFICANT CHANGE UP (ref 135–145)
WBC # BLD: 8.28 K/UL — SIGNIFICANT CHANGE UP (ref 3.8–10.5)
WBC # FLD AUTO: 8.28 K/UL — SIGNIFICANT CHANGE UP (ref 3.8–10.5)

## 2022-10-28 PROCEDURE — 36415 COLL VENOUS BLD VENIPUNCTURE: CPT

## 2022-10-28 PROCEDURE — 99283 EMERGENCY DEPT VISIT LOW MDM: CPT

## 2022-10-28 PROCEDURE — 82272 OCCULT BLD FECES 1-3 TESTS: CPT

## 2022-10-28 PROCEDURE — 80048 BASIC METABOLIC PNL TOTAL CA: CPT

## 2022-10-28 PROCEDURE — 85025 COMPLETE CBC W/AUTO DIFF WBC: CPT

## 2022-10-28 PROCEDURE — 99284 EMERGENCY DEPT VISIT MOD MDM: CPT

## 2022-10-28 RX ORDER — KETOCONAZOLE 20 MG/G
1 AEROSOL, FOAM TOPICAL
Qty: 1 | Refills: 0
Start: 2022-10-28 | End: 2022-11-10

## 2022-10-28 NOTE — ED PROVIDER NOTE - OBJECTIVE STATEMENT
32 yr old male c/o brown stain in underwear today with burning and itchy pain at perineal area. no nv, no ac use

## 2022-10-28 NOTE — ED ADULT NURSE NOTE - CHIEF COMPLAINT QUOTE
from [facility] c/o rectal bleeding , noted dark blood on his underwear c/o abd pain this morning no n/v

## 2022-10-28 NOTE — ED PROVIDER NOTE - NSFOLLOWUPINSTRUCTIONS_ED_ALL_ED_FT
Please see a gastroenterologist continues. keep groin area dry and clean. you have a fungal infection. do not scratch area. use the cream.

## 2022-10-28 NOTE — ED PROVIDER NOTE - PATIENT PORTAL LINK FT
You can access the FollowMyHealth Patient Portal offered by [facility] by registering at the following website: [email or web address]. By joining [employer]’s FollowMyHealth portal, you will also be able to view your health information using other applications (apps) compatible with our system.

## 2022-10-28 NOTE — ED ADULT NURSE NOTE - NS ED NURSE LEVEL OF CONSCIOUSNESS AFFECT
Post-Anesthesia Evaluation and Assessment    Patient: Josias Jones MRN: [id number]  SSN: [id number]    YOB: 1944  Age: 65 y.o. Sex: female      I have evaluated the patient and they are stable and ready for discharge from the PACU. Cardiovascular Function/Vital Signs  Visit Vitals  /65   Pulse 60   Temp 36.9 °C (98.4 °F)   Resp 17   SpO2 100%       Patient is status post General anesthesia for Procedure(s):  ROBOTIC RIGHT INGUINAL HERNIA REPAIR WITH MESH. Nausea/Vomiting: None    Postoperative hydration reviewed and adequate. Pain:  Pain Scale 1: Visual (pt. grimacing, coughing, indicates nausea) (02/18/22 1153)  Pain Intensity 1: 0 (02/18/22 0851)   Managed    Neurological Status:   Neuro (WDL): Exceptions to WDL (drowsy from anesthesia) (02/18/22 1153)  Neuro  LUE Motor Response: Purposeful (02/18/22 1153)  LLE Motor Response: Purposeful (02/18/22 1153)  RUE Motor Response: Purposeful (02/18/22 1153)  RLE Motor Response: Purposeful (02/18/22 1153)   At baseline    Mental Status, Level of Consciousness: Alert and  oriented to person, place, and time    Pulmonary Status:   O2 Device: Nasal cannula;Oral airway (02/18/22 1153)   Adequate oxygenation and airway patent    Complications related to anesthesia: None    Post-anesthesia assessment completed.  No concerns    Signed By: Karen Diggs MD     February 18, 2022 Appropriate

## 2022-10-28 NOTE — ED ADULT TRIAGE NOTE - CHIEF COMPLAINT QUOTE
from [facility] c/o rectal bleeding , noted dark blood on his underwear c/o abd pain this morning no n/v

## 2022-10-28 NOTE — ED ADULT NURSE NOTE - OBJECTIVE STATEMENT
Pt arrived from [facility] , accompanied by staff member, c/o noticed blood stain on his underwear  this morning, had mild abd pain this morning, denies pain now, denies nausea or vomiting.

## 2022-10-28 NOTE — ED PROVIDER NOTE - GASTROINTESTINAL, MLM
Abdomen soft, non-tender, no guarding. rectal- excoriated perineal, no fluctuance. no hemorrhoids. fingertip no blood, brown stool

## 2022-10-28 NOTE — ED PROVIDER NOTE - PROGRESS NOTE DETAILS
Ayoub: pt h/h stable. can see outpatient gastroenterologist if continues. BUN normal which makes upper gib less likely also without any upper gi sx.  can start on antifungal for jock itch.

## 2022-10-28 NOTE — ED PROVIDER NOTE - CLINICAL SUMMARY MEDICAL DECISION MAKING FREE TEXT BOX
32 yr old male c/o brown stain in underwear today with burning and itchy pain at perineal area. no nv, no ac use    likely tinea cruris of the perineal area.

## 2022-10-28 NOTE — ED ADULT NURSE NOTE - NSIMPLEMENTINTERV_GEN_ALL_ED
Implemented All Universal Safety Interventions:  [place] to call system. Call bell, personal items and telephone within reach. Instruct patient to call for assistance. Room bathroom lighting operational. Non-slip footwear when patient is off stretcher. Physically safe environment: no spills, clutter or unnecessary equipment. Stretcher in lowest position, wheels locked, appropriate side rails in place.

## 2022-11-03 ENCOUNTER — EMERGENCY (EMERGENCY)
Facility: HOSPITAL | Age: 32
LOS: 1 days | Discharge: HOME | End: 2022-11-03
Attending: STUDENT IN AN ORGANIZED HEALTH CARE EDUCATION/TRAINING PROGRAM
Payer: MEDICAID

## 2022-11-03 VITALS
DIASTOLIC BLOOD PRESSURE: 76 MMHG | HEIGHT: 71 IN | OXYGEN SATURATION: 97 % | HEART RATE: 86 BPM | RESPIRATION RATE: 16 BRPM | TEMPERATURE: 98 F | SYSTOLIC BLOOD PRESSURE: 116 MMHG

## 2022-11-03 LAB — SARS-COV-2 RNA SPEC QL NAA+PROBE: SIGNIFICANT CHANGE UP

## 2022-11-03 PROCEDURE — 87635 SARS-COV-2 COVID-19 AMP PRB: CPT

## 2022-11-03 PROCEDURE — 99284 EMERGENCY DEPT VISIT MOD MDM: CPT

## 2022-11-03 PROCEDURE — 93010 ELECTROCARDIOGRAM REPORT: CPT

## 2022-11-03 PROCEDURE — 93005 ELECTROCARDIOGRAM TRACING: CPT

## 2022-11-03 PROCEDURE — 99283 EMERGENCY DEPT VISIT LOW MDM: CPT

## 2022-11-03 NOTE — ED PROVIDER NOTE - NSFOLLOWUPINSTRUCTIONS_ED_ALL_ED_FT
You were seen in the emergency room today. Please call your primary doctor to inform them of this ER visit and obtain the next available appointment within the next 5 days. As we discussed, return to the ER if you have any worsening symptoms.    We no longer feel that you need further emergency care or admission to the hospital at this time.    While we have determined that you are currently stable for discharge, we know that things can change. Please seek immediate medical attention or return to the ER if you experience any of the following:  Any worsening or persistent symptoms  Severe Pain  Chest Pain  Difficulty Breathing  Bleeding  Passing Out  Severe Rash  Inability to Eat or Drink  Persistent Fever    Please see a primary care doctor or specialist within 5 days to ensure that you are improving.    Please call the [facility] phone numbers on this document if you have any problems obtaining a follow up appointment.    I wish you well! -Dr Georges

## 2022-11-03 NOTE — ED PROVIDER NOTE - PATIENT PORTAL LINK FT
You can access the FollowMyHealth Patient Portal offered by [facility] by registering at the following website: [email or web address]. By joining [email or web address]’s FollowMyHealth portal, you will also be able to view your health information using other applications (apps) compatible with our system.

## 2022-11-03 NOTE — ED PROVIDER NOTE - OBJECTIVE STATEMENT
31 yo M h/o autism spectrum disorder p/w episode of chest pain and requesting COVID swab. No h/o heart problems nor clots. Pt denies recent fever or infectious symptoms/ N/V/ diarrhea, dysuria or frequency/hesitancy, SOB, focal numbness/weakness, syncope, other recent illness or hospitalizations.

## 2022-11-03 NOTE — ED PROVIDER NOTE - NS ED ROS FT
Patient Reports No  Fever/Chills  Nausea/Vomiting/Diarrhea  Urinary Symptoms  Shortness of Breath  Syncope, Focal Numbness or Weakness  Other Recent Illness or Hospitalizations

## 2022-11-03 NOTE — ED PROVIDER NOTE - CLINICAL SUMMARY MEDICAL DECISION MAKING FREE TEXT BOX
Pt p/w chest pain with reassuring exam and nml VS. Pt with very frequent visits and no cardiac RFs. Pt wants COVID swab. Rec PMD f/u ASAP. Most likely non emergent etiology of symptoms- the details of the case, history, and exam make more emergent diagnoses much less likely. Discussed with pt my clinical impression and results, patient given strict return precautions if persistent or worsening of symptoms occurs, and need for close follow up. Pt expressed understanding and agrees with plan. Pt is well appearing with a reassuring exam. Discharge home with PMD or Specialist f/u within 5 days.

## 2022-11-04 ENCOUNTER — RESULT REVIEW (OUTPATIENT)
Age: 32
End: 2022-11-04

## 2022-11-04 ENCOUNTER — APPOINTMENT (OUTPATIENT)
Dept: HEMATOLOGY ONCOLOGY | Facility: CLINIC | Age: 32
End: 2022-11-04

## 2022-11-04 VITALS
WEIGHT: 299.82 LBS | BODY MASS INDEX: 43.02 KG/M2 | HEART RATE: 98 BPM | OXYGEN SATURATION: 96 % | RESPIRATION RATE: 19 BRPM | SYSTOLIC BLOOD PRESSURE: 127 MMHG | TEMPERATURE: 97.2 F | DIASTOLIC BLOOD PRESSURE: 92 MMHG

## 2022-11-04 LAB
BASOPHILS # BLD AUTO: 0.05 K/UL — SIGNIFICANT CHANGE UP (ref 0–0.2)
BASOPHILS NFR BLD AUTO: 0.6 % — SIGNIFICANT CHANGE UP (ref 0–2)
EOSINOPHIL # BLD AUTO: 0.29 K/UL — SIGNIFICANT CHANGE UP (ref 0–0.5)
EOSINOPHIL NFR BLD AUTO: 3.6 % — SIGNIFICANT CHANGE UP (ref 0–6)
HCT VFR BLD CALC: 43.1 % — SIGNIFICANT CHANGE UP (ref 39–50)
HGB BLD-MCNC: 14.2 G/DL — SIGNIFICANT CHANGE UP (ref 13–17)
IMM GRANULOCYTES NFR BLD AUTO: 1.8 % — HIGH (ref 0–0.9)
LYMPHOCYTES # BLD AUTO: 2.42 K/UL — SIGNIFICANT CHANGE UP (ref 1–3.3)
LYMPHOCYTES # BLD AUTO: 30.3 % — SIGNIFICANT CHANGE UP (ref 13–44)
MCHC RBC-ENTMCNC: 25.4 PG — LOW (ref 27–34)
MCHC RBC-ENTMCNC: 32.9 G/DL — SIGNIFICANT CHANGE UP (ref 32–36)
MCV RBC AUTO: 77.1 FL — LOW (ref 80–100)
MONOCYTES # BLD AUTO: 0.63 K/UL — SIGNIFICANT CHANGE UP (ref 0–0.9)
MONOCYTES NFR BLD AUTO: 7.9 % — SIGNIFICANT CHANGE UP (ref 2–14)
NEUTROPHILS # BLD AUTO: 4.46 K/UL — SIGNIFICANT CHANGE UP (ref 1.8–7.4)
NEUTROPHILS NFR BLD AUTO: 55.8 % — SIGNIFICANT CHANGE UP (ref 43–77)
NRBC # BLD: 0 /100 WBCS — SIGNIFICANT CHANGE UP (ref 0–0)
PLATELET # BLD AUTO: 182 K/UL — SIGNIFICANT CHANGE UP (ref 150–400)
RBC # BLD: 5.59 M/UL — SIGNIFICANT CHANGE UP (ref 4.2–5.8)
RBC # FLD: 14.4 % — SIGNIFICANT CHANGE UP (ref 10.3–14.5)
WBC # BLD: 7.99 K/UL — SIGNIFICANT CHANGE UP (ref 3.8–10.5)
WBC # FLD AUTO: 7.99 K/UL — SIGNIFICANT CHANGE UP (ref 3.8–10.5)

## 2022-11-04 PROCEDURE — 99213 OFFICE O/P EST LOW 20 MIN: CPT

## 2022-11-04 NOTE — ASSESSMENT
[FreeTextEntry1] : Markus Romero Jr is a 32 years old male with history of autism who has newly diagnosed stage Ia seminoma \par \par  He has stage Ia seminoma. Although his only LDH was 308, hCG and AFP were normal. LDH is nonspecific. NCCN also mentioned decisions regarding treatment should not be made on mildly elevated less than 3 upper limited of normal LDH alone. His LDH will be followed. He has stage Ia seminoma. The recurrence in 5 years is about 10% to 15%. If he is treated with radiation vs one or two doses of carboplatin auc 7, overtreatment is 85% to 90%.  He will be under surveillance given salvage treatment about 100%. He will have follow up in one month for LDH level, then every 3 months in the first year, every 6 months in the year 2 and 3, annually in the year 4 and 5. Surveillance images every 6 months in the first two years, annually in the year 3, 4 and 5. His LDH on Oct 27 was 325, however, hCG and AFP wnl. \par \par Plan\par check labs including LDH, hCG and AFP today\par try to avoid heavy lifting in next two weeks\par RTC one month\par \par

## 2022-11-04 NOTE — HISTORY OF PRESENT ILLNESS
[Disease: _____________________] : Disease: [unfilled] [T: ___] : T[unfilled] [N: ___] : N[unfilled] [M: ___] : M[unfilled] [AJCC Stage: ____] : AJCC Stage: [unfilled] [Date: ____________] : Patient's last distress assessment performed on [unfilled]. [3 - Distress Level] : Distress Level: 3 [de-identified] : Markus Bazan 32 years old male with history of autism who initially presented left testicle pain and swelling on April 2022, US testicle showed a 2.7cm mass in the left testicle\par again on 9/26 on ED and admitted for left testicle pain and swelling \par \par US showed 4.0cm lesion\par CT abdomen and pelvis post orchiectomy showed postsurgical changes\par \par 9/25  hCG<1, AFP 3.3 and \par \par 9/27 left radical  orchiectomy, pathology showed seminoma, tumor invades rete testis, margins negative, LVI negative, pT1b tumor limited to the testis, pT1b\par \par  [de-identified] : seminoma  [de-identified] : 11/4/22 report burning while urinating, frequency and

## 2022-11-05 ENCOUNTER — EMERGENCY (EMERGENCY)
Facility: HOSPITAL | Age: 32
LOS: 1 days | Discharge: HOME | End: 2022-11-05
Attending: EMERGENCY MEDICINE
Payer: MEDICAID

## 2022-11-05 VITALS
SYSTOLIC BLOOD PRESSURE: 115 MMHG | RESPIRATION RATE: 18 BRPM | HEART RATE: 70 BPM | DIASTOLIC BLOOD PRESSURE: 74 MMHG | TEMPERATURE: 98 F | OXYGEN SATURATION: 98 %

## 2022-11-05 VITALS
SYSTOLIC BLOOD PRESSURE: 117 MMHG | TEMPERATURE: 98 F | OXYGEN SATURATION: 98 % | WEIGHT: 289.91 LBS | DIASTOLIC BLOOD PRESSURE: 67 MMHG | HEART RATE: 89 BPM | HEIGHT: 71 IN | RESPIRATION RATE: 18 BRPM

## 2022-11-05 LAB
APPEARANCE UR: CLEAR — SIGNIFICANT CHANGE UP
BACTERIA # UR AUTO: ABNORMAL /HPF
BILIRUB UR-MCNC: NEGATIVE — SIGNIFICANT CHANGE UP
COLOR SPEC: YELLOW — SIGNIFICANT CHANGE UP
DIFF PNL FLD: NEGATIVE — SIGNIFICANT CHANGE UP
EPI CELLS # UR: SIGNIFICANT CHANGE UP /HPF
GLUCOSE UR QL: NEGATIVE — SIGNIFICANT CHANGE UP
KETONES UR-MCNC: ABNORMAL
LEUKOCYTE ESTERASE UR-ACNC: NEGATIVE — SIGNIFICANT CHANGE UP
NITRITE UR-MCNC: NEGATIVE — SIGNIFICANT CHANGE UP
PH UR: 5 — SIGNIFICANT CHANGE UP (ref 5–8)
PROT UR-MCNC: 30 MG/DL
RBC CASTS # UR COMP ASSIST: SIGNIFICANT CHANGE UP /HPF (ref 0–2)
SP GR SPEC: 1.02 — SIGNIFICANT CHANGE UP (ref 1.01–1.02)
UROBILINOGEN FLD QL: 1
WBC UR QL: SIGNIFICANT CHANGE UP /HPF (ref 0–5)

## 2022-11-05 PROCEDURE — 87086 URINE CULTURE/COLONY COUNT: CPT

## 2022-11-05 PROCEDURE — 81001 URINALYSIS AUTO W/SCOPE: CPT

## 2022-11-05 PROCEDURE — 99283 EMERGENCY DEPT VISIT LOW MDM: CPT

## 2022-11-05 PROCEDURE — 99284 EMERGENCY DEPT VISIT MOD MDM: CPT

## 2022-11-05 NOTE — ED PROVIDER NOTE - PROGRESS NOTE DETAILS
The patient is requesting to be admitted psychiatrically. I called x2 to his psychiatry nurse. The patient is requesting to be admitted psychiatrically. I called x2 to his psychiatry nurse. [phone]. no answer back. or call back from voicemail. I reviewed the 3 most recent behavioral and under similar circumstances of about to be discharged requests to be seen. pt has chronically elevated risk of self harm (has no suicide attempts but does have self harm injurys) but not any new or acute risk that an admission would be helpful to treat. [facility] and has good resources available. will be discharged w aide who will assist to ensure appropriate f/u w his private psychiatrist. is on chronic medication therapy. pt urinated freely.

## 2022-11-05 NOTE — ED PROVIDER NOTE - OBJECTIVE STATEMENT
32 year old Male well known among myself and the ER department presents to the ED with complaint of "urinary issues". Patient denies fevers, chills, dysuria, cough, abdominal pain, and states he has not been able to pee. Patient is accompanied by someone from [facility] to assist with his history. Patient is allergic to Haldol and Zyprexa.

## 2022-11-05 NOTE — ED PROVIDER NOTE - CLINICAL SUMMARY MEDICAL DECISION MAKING FREE TEXT BOX
Will do bladder scan and urine analysis. Patient has history of acting out, and requesting the nurses to do procedural handling his penis.

## 2022-11-05 NOTE — ED PROVIDER NOTE - NSFOLLOWUPINSTRUCTIONS_ED_ALL_ED_FT
IMPORTANT INSTRUCTIONS FROM Dr. SHAW:    Please follow up with your personal medical doctor in 24-48 hours. See your psychiatrist next week.  Bring results from today to your visit.    If you were advised to take any medications - be sure to review the package insert.    If your symptoms change, get worse or if you have any new symptoms, come to the ER right away.  If you have any questions, call the ER at the phone number on this page.

## 2022-11-05 NOTE — ED PROVIDER NOTE - PATIENT PORTAL LINK FT
You can access the FollowMyHealth Patient Portal offered by [facility] by registering at the following website: [email or web address]. By joining [email or web address]’s FollowMyHealth portal, you will also be able to view your health information using other applications (apps) compatible with our system.

## 2022-11-05 NOTE — ED ADULT NURSE NOTE - OBJECTIVE STATEMENT
C/o only able to urinate a little this morning. Bladder scanned with 1 ml urine noted in bladder. Urine sample requested. Pitcher of water given to pt to drink.

## 2022-11-06 LAB
CULTURE RESULTS: SIGNIFICANT CHANGE UP
SPECIMEN SOURCE: SIGNIFICANT CHANGE UP

## 2022-11-07 NOTE — H&P ADULT - NSICDXNOPASTSURGICALHX_GEN_ALL_CORE
Patient called in regards of message please return call back    <-- Click to add NO significant Past Surgical History

## 2022-11-14 NOTE — ED PROVIDER NOTE - CROS ED CONS ALL NEG
negative... Rifampin Counseling: I discussed with the patient the risks of rifampin including but not limited to liver damage, kidney damage, red-orange body fluids, nausea/vomiting and severe allergy.

## 2022-11-14 NOTE — ED PROVIDER NOTE - CONSTITUTIONAL NOURISHMENT, MLM
Detail Level: Detailed Number Of Curettages: 3 Size Of Lesion In Cm: 2 Size Of Lesion After Curettage: 2.4 Add Intralesional Injection: No Concentration (Mg/Ml Or Millions Of Plaque Forming Units/Cc): 0.01 Total Volume (Ccs): 1 Anesthesia Type: 0.5% lidocaine with 1:200,000 epinephrine Cautery Type: electrodesiccation What Was Performed First?: Curettage Final Size Statement: The size of the lesion after curettage was Additional Information: (Optional): The wound was cleaned, and a pressure dressing was applied.  The patient received detailed post-op instructions. Consent was obtained from the patient. The risks, benefits and alternatives to therapy were discussed in detail. Specifically, the risks of infection, scarring, bleeding, prolonged wound healing, nerve injury, incomplete removal, allergy to anesthesia and recurrence were addressed. Alternatives to ED&C, such as: surgical removal and XRT were also discussed.  Prior to the procedure, the treatment site was clearly identified and confirmed by the patient. All components of Universal Protocol/PAUSE Rule completed. Post-Care Instructions: I reviewed with the patient in detail post-care instructions. Patient is to keep the area dry for 48 hours, and not to engage in any swimming until the area is healed. Should the patient develop any fevers, chills, bleeding, severe pain patient will contact the office immediately. Bill As A Line Item Or As Units: Line Item Size Of Lesion In Cm: 1.8 Size Of Lesion After Curettage: 2.2 Additional Information: (Optional): The wound was cleaned, and a pressure dressing was applied.  The patient received detailed post-op instructions. Consent was obtained from the patient. The risks, benefits and alternatives to therapy were discussed in detail. Specifically, the risks of infection, scarring, bleeding, prolonged wound healing, nerve injury, incomplete removal, allergy to anesthesia and recurrence were addressed. Alternatives to ED&C, such as: surgical removal and XRT were also discussed.  Prior to the procedure, the treatment site was clearly identified and confirmed by the patient. All components of Universal Protocol/PAUSE Rule completed. Size Of Lesion In Cm: 0.5 Size Of Lesion After Curettage: 0.9 Size Of Lesion In Cm: 0.6 Size Of Lesion After Curettage: 2.8 Size Of Lesion In Cm: 0.7 Size Of Lesion After Curettage: 1.1 OBESE

## 2022-11-18 ENCOUNTER — RESULT REVIEW (OUTPATIENT)
Age: 32
End: 2022-11-18

## 2022-11-18 ENCOUNTER — APPOINTMENT (OUTPATIENT)
Dept: HEMATOLOGY ONCOLOGY | Facility: CLINIC | Age: 32
End: 2022-11-18

## 2022-11-18 LAB
BASOPHILS # BLD AUTO: 0.04 K/UL — SIGNIFICANT CHANGE UP (ref 0–0.2)
BASOPHILS NFR BLD AUTO: 0.6 % — SIGNIFICANT CHANGE UP (ref 0–2)
EOSINOPHIL # BLD AUTO: 0.27 K/UL — SIGNIFICANT CHANGE UP (ref 0–0.5)
EOSINOPHIL NFR BLD AUTO: 4.4 % — SIGNIFICANT CHANGE UP (ref 0–6)
HCT VFR BLD CALC: 44.7 % — SIGNIFICANT CHANGE UP (ref 39–50)
HGB BLD-MCNC: 14.6 G/DL — SIGNIFICANT CHANGE UP (ref 13–17)
IMM GRANULOCYTES NFR BLD AUTO: 1 % — HIGH (ref 0–0.9)
LYMPHOCYTES # BLD AUTO: 2.3 K/UL — SIGNIFICANT CHANGE UP (ref 1–3.3)
LYMPHOCYTES # BLD AUTO: 37.1 % — SIGNIFICANT CHANGE UP (ref 13–44)
MCHC RBC-ENTMCNC: 25.5 PG — LOW (ref 27–34)
MCHC RBC-ENTMCNC: 32.7 G/DL — SIGNIFICANT CHANGE UP (ref 32–36)
MCV RBC AUTO: 78 FL — LOW (ref 80–100)
MONOCYTES # BLD AUTO: 0.49 K/UL — SIGNIFICANT CHANGE UP (ref 0–0.9)
MONOCYTES NFR BLD AUTO: 7.9 % — SIGNIFICANT CHANGE UP (ref 2–14)
NEUTROPHILS # BLD AUTO: 3.04 K/UL — SIGNIFICANT CHANGE UP (ref 1.8–7.4)
NEUTROPHILS NFR BLD AUTO: 49 % — SIGNIFICANT CHANGE UP (ref 43–77)
NRBC # BLD: 0 /100 WBCS — SIGNIFICANT CHANGE UP (ref 0–0)
PLATELET # BLD AUTO: 195 K/UL — SIGNIFICANT CHANGE UP (ref 150–400)
RBC # BLD: 5.73 M/UL — SIGNIFICANT CHANGE UP (ref 4.2–5.8)
RBC # FLD: 14 % — SIGNIFICANT CHANGE UP (ref 10.3–14.5)
WBC # BLD: 6.2 K/UL — SIGNIFICANT CHANGE UP (ref 3.8–10.5)
WBC # FLD AUTO: 6.2 K/UL — SIGNIFICANT CHANGE UP (ref 3.8–10.5)

## 2022-11-20 ENCOUNTER — EMERGENCY (EMERGENCY)
Facility: HOSPITAL | Age: 32
LOS: 1 days | Discharge: HOME | End: 2022-11-20
Attending: EMERGENCY MEDICINE
Payer: MEDICAID

## 2022-11-20 VITALS
SYSTOLIC BLOOD PRESSURE: 112 MMHG | HEART RATE: 78 BPM | RESPIRATION RATE: 18 BRPM | DIASTOLIC BLOOD PRESSURE: 72 MMHG | HEIGHT: 71 IN | TEMPERATURE: 98 F | OXYGEN SATURATION: 100 %

## 2022-11-20 PROCEDURE — 99282 EMERGENCY DEPT VISIT SF MDM: CPT

## 2022-11-20 NOTE — ED ADULT TRIAGE NOTE - BP NONINVASIVE SYSTOLIC (MM HG)
Subjective    Patient: Amanda Cain   MRN: [id number]  : 1975    Reason For Visit  Patient presents today with Office Visit and Annual Exam      HPI  Pt here for for annual exam. Pt walks dogs. Getting a peloton bike this week. Pt had COVID booster 2 weeks ago in left arm and noticed intermittent right breast pain since. No today. Pt uses 4 advil frequently on days she works (2-3x/week). Pt is a hairdresser. Stand all day. Also having left knee pain after standing for several hours. Knee feels tight. [place] Menstrual migraines usually improve w/ excedrin migraine and occasional advil. Only needed excedrin prior and now has to add advil prn. Pt sees Dr [place] of OB. Due for mammo in March.    Review of Systems   Cardiovascular:        Some pm ankle swelling, improved by am   All other systems reviewed and are negative.      ALLERGIES:  No Known Allergies    No current outpatient medications on file.     No current facility-administered medications for this visit.       Patient Active Problem List   Diagnosis   • Basal cell carcinoma of face   • Vitamin B 12 deficiency   • Tinea corporis   • Annual physical exam   • Menstrual migraine   • Class 3 severe obesity due to excess calories without serious comorbidity with body mass index (BMI) of 40.0 to 44.9 in adult (CMS/HCC)       Past Medical History:   Diagnosis Date   • Menstrual migraine    • Vitamin B deficiency        Past Surgical History:   Procedure Laterality Date   •  section, low transverse       and    •  mohs surgery 1st stage  2020    Dr Dickerson, left cheek ,dr koffi Partida d/t basal cell cancer       Family History   Problem Relation Age of Onset   • Hypertension Mother    • Cancer Mother         breast and uterine cancer at 62 yo   • Hypertension Father    • Hypertension Brother        Social History     Tobacco Use   • Smoking status: Never Smoker   • Smokeless tobacco: Never Used   Substance Use Topics   •  Alcohol use: Yes     Comment: monthly   • Drug use: Never       Past medical history, problem list, family medical history, surgical history, and social history have all been reviewed    Objective    Visit Vitals  /80 (BP Location: LUE - Left upper extremity, Patient Position: Sitting, Cuff Size: Regular)   Pulse 76   Temp 98 °F (36.7 °C) (Temporal)   Resp 16   Ht 5' 6\" (1.676 m)   Wt 125.2 kg (276 lb)   LMP 01/11/2022 (Exact Date)   BMI 44.55 kg/m²       Physical Exam  Vitals and nursing note reviewed.   Constitutional:       General: She is not in acute distress.     Appearance: Normal appearance. She is well-developed. She is obese. She is not ill-appearing or diaphoretic.   HENT:      Head: Normocephalic and atraumatic.      Right Ear: Tympanic membrane, ear canal and external ear normal. There is no impacted cerumen.      Left Ear: Tympanic membrane, ear canal and external ear normal. There is no impacted cerumen.      Nose: Nose normal. No congestion or rhinorrhea.      Mouth/Throat:      Mouth: Mucous membranes are moist.      Pharynx: Oropharynx is clear. No oropharyngeal exudate or posterior oropharyngeal erythema.   Eyes:      General: No scleral icterus.        Right eye: No discharge.         Left eye: No discharge.      Conjunctiva/sclera: Conjunctivae normal.      Pupils: Pupils are equal, round, and reactive to light.   Neck:      Thyroid: No thyromegaly.      Vascular: No JVD.      Comments: No carotid bruits bilaterally.  No palpable thyromegaly  Cardiovascular:      Rate and Rhythm: Normal rate and regular rhythm.      Pulses: Normal pulses.      Heart sounds: Normal heart sounds. No murmur heard.  Pulmonary:      Effort: Pulmonary effort is normal. No respiratory distress.      Breath sounds: Normal breath sounds. No wheezing or rales.   Abdominal:      General: Bowel sounds are normal. There is no distension.      Palpations: Abdomen is soft. There is no mass.      Tenderness: There is no  abdominal tenderness. There is no guarding or rebound.      Hernia: No hernia is present.   Musculoskeletal:         General: No tenderness or deformity. Normal range of motion.      Cervical back: Normal range of motion and neck supple.      Right lower leg: No edema.      Left lower leg: No edema.   Lymphadenopathy:      Cervical: No cervical adenopathy.   Skin:     General: Skin is warm and dry.      Coloration: Skin is not pale.      Findings: No erythema or rash.   Neurological:      General: No focal deficit present.      Mental Status: She is alert and oriented to person, place, and time.      Cranial Nerves: No cranial nerve deficit.      Sensory: No sensory deficit.      Motor: No weakness.      Coordination: Coordination normal.      Gait: Gait normal.      Deep Tendon Reflexes: Reflexes normal.   Psychiatric:         Mood and Affect: Mood normal.         Behavior: Behavior normal.         Thought Content: Thought content normal.         Judgment: Judgment normal.           Assessment & Plan    Amanda was seen today for office visit and annual exam.    Diagnoses and all orders for this visit:    Annual physical exam  -     CBC WITH DIFFERENTIAL; Future  -     COMPREHENSIVE METABOLIC PANEL; Future  -     LIPID PANEL WITHOUT REFLEX; Future  -     THYROID STIMULATING HORMONE; Future  -     VITAMIN D -25 HYDROXY; Future  -     OCCULT BLOOD - FIT; Future    Menstrual migraine without status migrainosus, not intractable    Vitamin B 12 deficiency  -     VITAMIN B12; Future    Class 3 severe obesity due to excess calories without serious comorbidity with body mass index (BMI) of 40.0 to 44.9 in adult (CMS/[facility])      Age-appropriate screening test were reviewed with the patient.  Reviewed with the patient if her migraines worsen or persist she should call the office as we may want to try Maxalt.  Reviewed with the patient to avoid excessive Advil usage as this may cause GI upset and increased risk of ulcers and the  patient may also have rebound headaches from daily over-the-counter Tylenol or Advil usage.  We'll check a B12 level due to the patient's history of B12 deficiency.  Reviewed with the patient to try and increase her aerobic activity and try and lose weight.  Reviewed American Heart Association recommendations of 10,000 steps per day 150 minutes of aerobic activity weekly.  Reviewed with the patient that once she starts her Peloton bike she should work up gradually to American Heart Association recommendations.  Reviewed with the patient for every pound she loses she loses 4 pounds of pressure off her knees.  She is to wear good support shoes at work due to her knee pain.  All of her questions were answered.  Winifred Perez, DO     112

## 2022-11-20 NOTE — ED PROVIDER NOTE - OBJECTIVE STATEMENT
Patient is a 31 y/o male with no significant PMHx or PSHx presents to the ED to get his wound checked. Patient wants to get 2 abrasions check out on his left hand. He has been taking antibiotics and denies all other acute complaints.

## 2022-11-20 NOTE — ED PROVIDER NOTE - SKIN, MLM
Skin normal color for race, warm, dry and intact. No evidence of rash. Dry, no drainage, no erythema, no streaking.

## 2022-11-20 NOTE — ED PROVIDER NOTE - CROS ED ALLERGIC IMMUN ALL NEG
Patient Education     Viral Syndrome (Child)  A virus is the most common cause of illness among children. This may cause a number of different symptoms, depending on what part of the body is affected. If the virus settles in the nose, throat, and lungs, it causes cough, congestion, and sometimes headache. If it settles in the stomach and intestinal tract, it causes vomiting and diarrhea. Sometimes it causes vague symptoms of \"feeling bad all over,\" with fussiness, poor appetite, poor sleeping, and lots of crying. A light rash may also appear for the first few days, then fade away.  A viral illness usually lasts 1 to 2 weeks, but sometimes it lasts longer. Home measures are all that are needed to treat a viral illness. Antibiotics don't help. Occasionally, a more serious bacterial infection can look like a viral syndrome in the first few days of the illness.   Home care  Follow these guidelines to care for your child at home:  · Fluids. Fever increases water loss from the body. For infants under 1 year old, continue regular feedings (formula or breast). Between feedings give oral rehydration solution, which is available from groceries and drugstores without a prescription. For children older than 1 year, give plenty of fluids like water, juice, ginger ale, lemonade, fruit-based drinks, or popsicles.    · Food. If your child doesn't want to eat solid foods, it's OK for a few days, as long as he or she drinks lots of fluid. (If your child has been diagnosed with a kidney disease, ask your child’s doctor how much and what types of fluids your child should drink to prevent dehydration. If your child has kidney disease, drinking too much fluid can cause it build up in the body and be dangerous to your child’s health.)  · Activity. Keep children with a fever at home resting or playing quietly. Encourage frequent naps. Your child may return to day care or school when the fever is gone and he or she is eating well and  feeling better.  · Sleep. Periods of sleeplessness and irritability are common. A congested child will sleep best with his or her head and upper body propped up on pillows or with the head of the bed frame raised on a 6-inch block. An infant may sleep in a car seat placed in the crib or in a baby swing.  · Cough. Coughing is a normal part of this illness. A cool mist humidifier at the bedside may be helpful. Over-the-counter (OTC) cough and cold medicine has not been proved to be any more helpful than sweet syrup with no medicine in it. But these medicines can produce serious side effects, especially in infants younger than 2 years. Don’t give OTC cough and cold medicines to children under age 6 years unless your doctor has specifically advised you to do so. Also, don’t expose your child to cigarette smoke. It can make the cough worse.  · Nasal congestion. Suction the nose of infants with a rubber bulb syringe. You may put 2 to 3 drops of saltwater (saline) nose drops in each nostril before suctioning to help remove secretions. Saline nose drops are available without a prescription. You can make it by adding 1/4 teaspoon table salt in 1 cup of water.  · Fever. You may give your child acetaminophen or ibuprofen to control pain and fever, unless another medicine was prescribed for this. If your child has chronic liver or kidney disease or ever had a stomach ulcer or GI bleeding, talk with your doctor before using these medicines. Do not give aspirin to anyone younger than 18 years who is ill with a fever. It may cause severe disease or death liver damage.  · Prevention. Wash your hands before and after touching your sick child to help prevent giving a new illness to your child and to prevent spreading this viral illness to yourself and to other children.  Follow-up care  Follow up with your child's healthcare provider as advised.  When to seek medical advice  Unless your child's health care provider advises otherwise,  call the provider right away if:  · Your child is 3 months old or younger and has a fever of 100.4°F (38°C) or higher. (Get medical care right away. Fever in a young baby can be a sign of a dangerous infection.)  · Your child is younger than 2 years of age and has a fever of 100.4°F (38°C) that continues for more than 1 day.  · Your child is 2 years old or older and has a fever of 100.4°F (38°C) that continues for more than 3 days.  · Your child is of any age and has repeated fevers above 104°F (40°C).  · Fussiness or crying that cannot be soothed  Also call for:  · Earache, sinus pain, stiff or painful neck, or headache Increasing abdominal pain or pain that is not getting better after 8 hours  · Repeated diarrhea or vomiting  · Appearance of a new rash  · Signs of dehydration: No wet diapers for 8 hours in infants, little or no urine older children, very dark urine, sunken eyes  · Burning when urinating  Call 911  Seek emergency medical care if any of the following occur:  · Lips or skin that turn blue, purple, or gray  · Neck stiffness or rash with a fever  · Convulsion (seizure)  · Wheezing or trouble breathing  · Unusual fussiness or drowsiness  · Confusion  © 7551-9129 [employer]. [address] [place] [address], [place] [address]. All rights reserved. This information is not intended as a substitute for professional medical care. Always follow your healthcare professional's instructions.            negative...

## 2022-11-20 NOTE — ED PROVIDER NOTE - PATIENT PORTAL LINK FT
You can access the FollowMyHealth Patient Portal offered by [facility] by registering at the following website: [email or web address]. By joining [employer]’s FollowMyHealth portal, you will also be able to view your health information using other applications (apps) compatible with our system.

## 2022-11-20 NOTE — ED PROVIDER NOTE - CLINICAL SUMMARY MEDICAL DECISION MAKING FREE TEXT BOX
Patient is a 29 y/o male who came to the ED to get his 2 abrasions to his left hand checked out. Will provide reassurance, dressing, and DC home.

## 2022-11-21 NOTE — CHART NOTE - NSCHARTNOTEFT_GEN_A_CORE
Pt is a 32 year old male  who was brought to the ED by EMS  from [facility] with complaint of wound check and pain to left hand. Pt is well known  to this martha and the rest of the ED staff. He was seen at bedside, alert and oriented x3. He complained of severe pain to his left hand. Emotional support provided. Pt was treated and released. Martha coordinated d/c with Mariposa Bynum ( [phone] ) Pt's aide at [facility] and she would pick Pt up via Taxi.  Pt was socially cleared and Physician updated.

## 2022-11-22 NOTE — BH CONSULTATION LIAISON PROGRESS NOTE - NSICDXBHSECONDARYDX_PSY_ALL_CORE
[address]  [address]  [facility] [address]  Dept: [phone]  Loc: [phone]    Visit Date: 11/22/2022        HPI:     Jonathan Wills is a 79 y.o. female who presents today for:  Chief Complaint   Patient presents with    1 Year Follow Up     MRI prior for bilateral renal cysts        HPI  Pt seen in follow up for renal lesions. Pt has a hx of Bosniak 2 and 3 renal lesions and liver cysts followed with surveillance since at least 2017. Here today to review MRI results. Denies abdominal or back pain. Had pacemaker changed since last appt otherwise no new changes. Current Outpatient Medications   Medication Sig Dispense Refill    bumetanide (BUMEX) 1 MG tablet Take 1 tablet by mouth 2 times daily 180 tablet 3    lisinopril (PRINIVIL;ZESTRIL) 20 MG tablet Take 1 tablet by mouth daily 90 tablet 3    isosorbide mononitrate (IMDUR) 30 MG extended release tablet Take 1 tablet by mouth daily 90 tablet 3    rivaroxaban (XARELTO) 15 MG TABS tablet Take 1 tablet by mouth daily 90 tablet 3    atenolol (TENORMIN) 25 MG tablet Take 1 tablet by mouth daily 90 tablet 3    amLODIPine (NORVASC) 5 MG tablet Take 1 tablet by mouth daily 90 tablet 3    cloNIDine (CATAPRES) 0.1 MG tablet Take 1 tablet by mouth 2 times daily as needed for High Blood Pressure (for SBP >160) If over 160/100 90 tablet 3    potassium chloride (KLOR-CON M) 20 MEQ extended release tablet Take 1 tablet by mouth daily 90 tablet 3    fosfomycin tromethamine (MONUROL) 3 g PACK Take 1 packet by mouth every 3 days Take 1 pack on day 3 and 1 pack on day 5 2 each 0    loteprednol (LOTEMAX) 0.5 % ophthalmic suspension Place 1 drop into the right eye every other day      nitroGLYCERIN (NITROSTAT) 0.4 MG SL tablet up to max of 3 total doses.  If no relief after 1 dose, call 911. 25 tablet 3    Multiple Vitamin (MULTI-VITAMIN DAILY PO) Take by mouth daily
Glucosamine-Chondroitin (GLUCOSAMINE CHONDR COMPLEX PO) Take 2 tablets by mouth daily Move Free      Cholecalciferol (VITAMIN D-3 PO) Take 1,000 Units by mouth daily       Multiple Vitamins-Minerals (OCUVITE PO) Take 1 tablet by mouth daily       calcium carbonate 600 MG TABS tablet Take 1 tablet by mouth daily. No current facility-administered medications for this visit. Past Medical History  Jordan Amos  has a past medical history of Arthritis, Atrial fibrillation ([facility] [address] Cancer ([facility] [address] CHF (congestive heart failure) ([facility] [address] Chronic renal disease, stage III ([facility] [address] [894013], Gross hematuria, HTN (hypertension), Hyperlipidemia, Pneumonia, and Type 2 diabetes mellitus. Past Surgical History  The patient  has a past surgical history that includes Hysterectomy (1982); Foot surgery; knee surgery (2010); liver biopsy (08/23/2017); Colonoscopy; eye surgery; skin biopsy; and Carpal tunnel release (10/23/2018). Family History  This patient's family history includes Arthritis in her father; Cancer in her brother, mother, sister, and sister; Diabetes in her sister and sister; Early Death in her brother; Heart Attack in her father; Heart Disease in her father; High Blood Pressure in her father. Social History  Jordan Amos  reports that she has never smoked. She has never used smokeless tobacco. She reports that she does not drink alcohol and does not use drugs. Subjective:      Review of Systems   Constitutional:  Negative for activity change, appetite change, chills, diaphoresis, fatigue, fever and unexpected weight change. Gastrointestinal:  Negative for abdominal pain, nausea and vomiting. Genitourinary:  Negative for decreased urine volume, difficulty urinating, dysuria, flank pain, frequency, hematuria and urgency. Musculoskeletal:  Negative for back pain. Objective:   /82   Ht 5' 3\" (1.6 m)   Wt 145 lb (65.8 kg)   BMI 25.69 kg/m²     Physical Exam  Vitals reviewed.    Constitutional:
General: She is not in acute distress. Appearance: Normal appearance. She is well-developed. She is not ill-appearing or diaphoretic. HENT:      Head: Normocephalic and atraumatic. Right Ear: External ear normal.      Left Ear: External ear normal.      Nose: Nose normal.      Mouth/Throat:      Mouth: Mucous membranes are moist.   Eyes:      General: No scleral icterus. Right eye: No discharge. Left eye: No discharge. Neck:      Vascular: No JVD. Trachea: No tracheal deviation. Pulmonary:      Effort: Pulmonary effort is normal. No respiratory distress. Abdominal:      General: There is no distension. Tenderness: There is no abdominal tenderness. There is no right CVA tenderness or left CVA tenderness. Musculoskeletal:         General: No tenderness. Normal range of motion. Skin:     General: Skin is warm and dry. Neurological:      Mental Status: She is alert and oriented to person, place, and time. Mental status is at baseline. Psychiatric:         Mood and Affect: Mood normal.         Behavior: Behavior normal.         Thought Content: Thought content normal.       POC  No results found for this visit on 11/22/22. Patients recent PSA values are as follows  No results found for: PSA, PSADIA     Recent BUN/Creatinine:  Lab Results   Component Value Date/Time    BUN 26 09/08/2022 01:45 PM    CREATININE 0.9 09/08/2022 01:45 PM       Radiology  The patient has had a MRI abdomen without contrast 11/2/22 which I have independently reviewed along with its accompanying report. The study demonstrates stable renal lesions. PROCEDURE: MRI ABDOMEN WO CONTRAST       CLINICAL INFORMATION: Renal lesion       COMPARISON: MRI abdomen 11/16/2021, 10/29/2020       TECHNIQUE: Multisequence and multiplanar MRI of the abdomen was performed without contrast. T1 and T2 contrast information were emphasized.            FINDINGS:        The noncontrast MRI limits the evaluation
for solid organ pathology, vascular pathology, and lymphadenopathy. Measurements comparison are based on this observer's measurements of the current and prior examinations. LOWER THORAX: The heart is mildly enlarged. Small bilateral pleural effusions. HEPATOBILIARY:    1. The noncontrast evaluation limits the ability to visualize the heterogeneous enhancing region in the right hepatic lobe seen on the previous MRI. 2. In the right hepatic lobe, at the region of previously seen heterogeneous enhancement is an area of mildly T2 hyperintensity. 3.  Unremarkable hepatic surface morphology. 4. The gallbladder, and biliary tree are unremarkable. PANCREAS AND SPLEEN: Marked atrophy of the pancreas No peripancreatic abnormality. The spleen is not enlarged       RETROPERITONEUM:    1. There is a 2.2 x 2.1 cm lesion in the superior pole of the right kidney corresponding to the rim enhancing lesion seen on the prior exam where it measured 2 x 2 cm. No significant interval change in size. 2. A 1.4 x 1.2 cm lesion is seen in the midpole of the right kidney which corresponds to the nonenhancing lesion seen on the prior exam which previously measured 1.2 x 1.4 cm. 3. There are other subcentimeter T2 hyperintense lesions in the left kidney that are likely cysts given their appearance on the prior exam.   4. No hydronephrosis. 5. The adrenal glands are not enlarged       BOWEL AND PERITONEUM:  No bowel obstruction or acute inflammatory bowel process. VASCULAR: The abdominal aorta is not aneurysmal.       LYMPH NODES: No significantly enlarged lymph nodes are seen. BONES: Degenerative changes of the thoracolumbar spine. Scoliosis. OTHER: None. Impression   1. A stable 2.2 x 2.1 cm lesion in the superior pole of the right kidney is noted. This lesion previously showed enhancement on the prior examination and is worrisome for a malignancy.        2. A 1.4 x 1.2 cm
lesion in the midpole of the right kidney was without enhancement on the prior exam and is stable in size on this exam.       3. The heterogeneous area of enhancement in the right hepatic lobe cannot be evaluated on this study due to due to noncontrast nature of the examination. 4. Small bilateral pleural effusions. 5. Other findings as described above. Assessment:   Bilateral renal cysts    Plan:     Reviewed MRI results with Thelda Pencil and son present at appt today. No significant interval change. Renal lesions less than 3 cms in size. Pt is aware of the possibility of malignancy but prefers to hold off on any treatment. Lesions present since 2017 without significant change in size. Hold off on further imaging at this time. Return PRN.
Autism spectrum disorder   F84.0  HAVEN (generalized anxiety disorder)   F41.1  Mood disorder   F39  Chronic post-traumatic stress disorder (PTSD)   F43.12  Somatic symptom disorder   F45.1  ADHD   F90.9  
Autism spectrum disorder   F84.0  HAVEN (generalized anxiety disorder)   F41.1  Mood disorder   F39  Chronic post-traumatic stress disorder (PTSD)   F43.12  Somatic symptom disorder   F45.1  ADHD   F90.9

## 2022-11-23 NOTE — ED PROVIDER NOTE - CROS ED CONS ALL NEG
[Normal] : soft, non-tender, non-distended, no masses palpated, no HSM and normal bowel sounds negative...

## 2022-12-01 ENCOUNTER — EMERGENCY (EMERGENCY)
Facility: HOSPITAL | Age: 32
LOS: 1 days | Discharge: HOME | End: 2022-12-01
Attending: EMERGENCY MEDICINE | Admitting: EMERGENCY MEDICINE
Payer: MEDICAID

## 2022-12-01 VITALS
OXYGEN SATURATION: 98 % | RESPIRATION RATE: 17 BRPM | SYSTOLIC BLOOD PRESSURE: 100 MMHG | HEART RATE: 88 BPM | TEMPERATURE: 98 F | HEIGHT: 71 IN | DIASTOLIC BLOOD PRESSURE: 71 MMHG

## 2022-12-01 PROCEDURE — 99283 EMERGENCY DEPT VISIT LOW MDM: CPT

## 2022-12-01 PROCEDURE — 99282 EMERGENCY DEPT VISIT SF MDM: CPT

## 2022-12-01 NOTE — ED PROVIDER NOTE - PHYSICAL EXAMINATION
GENERAL: well appearing, no acute distress   HEAD: atraumatic   EYES: EOMI   ENT: moist oral mucosa   CARDIAC: regular rate  RESPIRATORY: no increased work of breathing   MUSCULOSKELETAL: no deformity   NEUROLOGICAL: alert, spontaneous movement of extremities   SKIN: dorsal L hand with 1 abrasion and 1 open wound w/ scant non pulsatile bleeding   PSYCHIATRIC: cooperative

## 2022-12-01 NOTE — ED PROVIDER NOTE - PATIENT PORTAL LINK FT
You can access the FollowMyHealth Patient Portal offered by [facility] by registering at the following website: [email or web address]. By joining [employer]’s FollowMyHealth portal, you will also be able to view your health information using other applications (apps) compatible with our system.

## 2022-12-01 NOTE — ED PROVIDER NOTE - CLINICAL SUMMARY MEDICAL DECISION MAKING FREE TEXT BOX
Wound check  Will place bandage and dc  Discussed indications for patient return to ED. Patient understood.
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
## 2022-12-01 NOTE — ED ADULT TRIAGE NOTE - CHIEF COMPLAINT QUOTE
She had an eye exam on 3/19/2021 without retinopathy by Dr. Evie Mcbride.  Refer back    Pt from group home., wound check. Pt noted open wound and dressing to left hand.

## 2022-12-02 ENCOUNTER — EMERGENCY (EMERGENCY)
Facility: HOSPITAL | Age: 32
LOS: 1 days | Discharge: HOME | End: 2022-12-02
Attending: STUDENT IN AN ORGANIZED HEALTH CARE EDUCATION/TRAINING PROGRAM
Payer: MEDICAID

## 2022-12-02 VITALS
TEMPERATURE: 98 F | DIASTOLIC BLOOD PRESSURE: 63 MMHG | OXYGEN SATURATION: 94 % | WEIGHT: 307.1 LBS | RESPIRATION RATE: 18 BRPM | HEIGHT: 71 IN | HEART RATE: 85 BPM | SYSTOLIC BLOOD PRESSURE: 106 MMHG

## 2022-12-02 PROCEDURE — 99283 EMERGENCY DEPT VISIT LOW MDM: CPT

## 2022-12-02 RX ORDER — DEXAMETHASONE 0.5 MG/5ML
10 ELIXIR ORAL ONCE
Refills: 0 | Status: COMPLETED | OUTPATIENT
Start: 2022-12-02 | End: 2022-12-02

## 2022-12-02 RX ADMIN — Medication 10 MILLIGRAM(S): at 20:09

## 2022-12-02 NOTE — ED PROVIDER NOTE - CLINICAL SUMMARY MEDICAL DECISION MAKING FREE TEXT BOX
Patient presenting for sob, endorses wheezing, no wheezing on exam. speaking full sentences. no airway compromise. clinically well. will give decadron, return precaution and pmd f.u instruction

## 2022-12-02 NOTE — ED ADULT NURSE NOTE - OBJECTIVE STATEMENT
came today due to asthma exacerbation pt comfortable sitting on chair, not in distress denies shortness of breath  seen by DR Newby.

## 2022-12-02 NOTE — ED PROVIDER NOTE - OBJECTIVE STATEMENT
33 y/o presenting for asthma exacerbation. given duoneb by ems. patient denies other symptoms. endorses feeling improved

## 2022-12-09 ENCOUNTER — OUTPATIENT (OUTPATIENT)
Dept: OUTPATIENT SERVICES | Facility: HOSPITAL | Age: 32
LOS: 1 days | Discharge: HOME | End: 2022-12-09

## 2022-12-09 DIAGNOSIS — C62.90 MALIGNANT NEOPLASM OF UNSPECIFIED TESTIS, UNSPECIFIED WHETHER DESCENDED OR UNDESCENDED: ICD-10-CM

## 2022-12-12 NOTE — ED ADULT TRIAGE NOTE - NS ED TRIAGE AVPU SCALE
Alert-The patient is alert, awake and responds to voice. The patient is oriented to time, place, and person. The triage nurse is able to obtain subjective information. Simponi Counseling:  I discussed with the patient the risks of golimumab including but not limited to myelosuppression, immunosuppression, autoimmune hepatitis, demyelinating diseases, lymphoma, and serious infections.  The patient understands that monitoring is required including a PPD at baseline and must alert us or the primary physician if symptoms of infection or other concerning signs are noted.

## 2022-12-15 ENCOUNTER — EMERGENCY (EMERGENCY)
Facility: HOSPITAL | Age: 32
LOS: 1 days | Discharge: HOME | End: 2022-12-15
Attending: EMERGENCY MEDICINE
Payer: MEDICAID

## 2022-12-15 VITALS
WEIGHT: 225.97 LBS | HEIGHT: 71 IN | SYSTOLIC BLOOD PRESSURE: 122 MMHG | RESPIRATION RATE: 17 BRPM | HEART RATE: 79 BPM | DIASTOLIC BLOOD PRESSURE: 80 MMHG | TEMPERATURE: 98 F | OXYGEN SATURATION: 98 %

## 2022-12-15 PROCEDURE — 99282 EMERGENCY DEPT VISIT SF MDM: CPT

## 2022-12-15 PROCEDURE — 99284 EMERGENCY DEPT VISIT MOD MDM: CPT

## 2022-12-15 NOTE — ED ADULT TRIAGE NOTE - NS ED TRIAGE AVPU SCALE
Pt was sick with viral illness the past days, now improving. She was tx with tamiflu, swab negative. Denies leaking, bleeding, or uterine contractions. Denies headache or epigastric pain at this time. Reports good fetal movements.   She reports she was getting headaches while she was sick, which has mostly resolved and went away on its own. She has been seeing spots of light the past 2 days, mostly at night time. Goes away after drinking water.   bp with /90 when pt walked in the room. I repeated it and it is 135/82. Pt denies headache or spots currently, never had epigastric pain.  Still taking labetalol 100 mg bid for her h/o chronic HTN, and asa 81. Baseline protein was 400 mg.   Will repeat cbc, cmp and uric acid. Also needs 1 hr gct today.   Will repeat 24 hr protein collection to look at significant increase. Will need to keep close eye on bp.  She also reports her bp with her machine at home is 150-170/100s.   Will need to have her bring the machine with her tomorrow so we can check it.  Will check bp tomorrow and again on Friday.  Strict preeclampsia precautions given.        Verbal - The patient responds to verbal stimuli by opening their eyes when someone speaks to them. The patient is not fully oriented to time, place, or person.

## 2022-12-15 NOTE — PROGRESS NOTE BEHAVIORAL HEALTH - BEHAVIOR
72F former smoker w/ hx of Afib on coumadin, HTN, HLD, DM, morbid obesity w/ prior lap band now removed, chronic LE venous stasis w/ recurrent cellulitis presenting with dyspnea on exertion/LE edema found to have severe AS and being evaluated for TAVR with pulmonary consulted to evaluate for possible pulmonary source     Dyspnea   likely multifactorial secondary to underlying obstruction/heart failure exacerbation in setting of AS/rule out parenchymal process   PFTs reviewed with mod-severe obstruction, possible restriction   pt does not appear to be in COPD exacerbation at this time, so will hold off on steroids   DuoNeb q6h   will repeat CT chest as prior demonstrated diffuse GGO but was possibly in the setting of heart failure   low suspicion for PE given pt was compliant with coumadin and INR supratherapeutic on admission   afebrile with no clinical signs of infection currently   incentive spirometry   wean FiO2 as tolerated for goal >90%  continue with diuresis   planned for possible cardiac cath tomorrow      Cooperative

## 2022-12-15 NOTE — ED PROVIDER NOTE - OBJECTIVE STATEMENT
33 y/o male comes in states he is in urinary retention and request mckeon    similar visits in the past to ED , bladder scan 50cc of urine

## 2022-12-15 NOTE — ED PROVIDER NOTE - PATIENT PORTAL LINK FT
You can access the FollowMyHealth Patient Portal offered by [facility] by registering at the following website: [email or web address]. By joining [employer]’s FollowMyHealth portal, you will also be able to view your health information using other applications (apps) compatible with our system.

## 2022-12-15 NOTE — ED PROVIDER NOTE - CLINICAL SUMMARY MEDICAL DECISION MAKING FREE TEXT BOX
pt states in urinary retention requesting mckeon    50 cc of urine in bladder   reassurance d/c home

## 2022-12-16 ENCOUNTER — RESULT REVIEW (OUTPATIENT)
Age: 32
End: 2022-12-16

## 2022-12-16 ENCOUNTER — APPOINTMENT (OUTPATIENT)
Dept: HEMATOLOGY ONCOLOGY | Facility: CLINIC | Age: 32
End: 2022-12-16

## 2022-12-16 VITALS
OXYGEN SATURATION: 98 % | DIASTOLIC BLOOD PRESSURE: 87 MMHG | BODY MASS INDEX: 42.92 KG/M2 | TEMPERATURE: 97.2 F | RESPIRATION RATE: 16 BRPM | HEIGHT: 70 IN | HEART RATE: 98 BPM | SYSTOLIC BLOOD PRESSURE: 128 MMHG | WEIGHT: 299.83 LBS

## 2022-12-16 LAB
AFP-TM SERPL-MCNC: 3.9 NG/ML
BASOPHILS # BLD AUTO: 0.03 K/UL — SIGNIFICANT CHANGE UP (ref 0–0.2)
BASOPHILS NFR BLD AUTO: 0.4 % — SIGNIFICANT CHANGE UP (ref 0–2)
EOSINOPHIL # BLD AUTO: 0.2 K/UL — SIGNIFICANT CHANGE UP (ref 0–0.5)
EOSINOPHIL NFR BLD AUTO: 2.7 % — SIGNIFICANT CHANGE UP (ref 0–6)
HCG SERPL-MCNC: <1 MIU/ML
HCT VFR BLD CALC: 43.9 % — SIGNIFICANT CHANGE UP (ref 39–50)
HGB BLD-MCNC: 14.1 G/DL — SIGNIFICANT CHANGE UP (ref 13–17)
IMM GRANULOCYTES NFR BLD AUTO: 0.9 % — SIGNIFICANT CHANGE UP (ref 0–0.9)
LDH SERPL-CCNC: 304 U/L
LYMPHOCYTES # BLD AUTO: 2.63 K/UL — SIGNIFICANT CHANGE UP (ref 1–3.3)
LYMPHOCYTES # BLD AUTO: 35 % — SIGNIFICANT CHANGE UP (ref 13–44)
MCHC RBC-ENTMCNC: 25.5 PG — LOW (ref 27–34)
MCHC RBC-ENTMCNC: 32.1 G/DL — SIGNIFICANT CHANGE UP (ref 32–36)
MCV RBC AUTO: 79.4 FL — LOW (ref 80–100)
MONOCYTES # BLD AUTO: 0.52 K/UL — SIGNIFICANT CHANGE UP (ref 0–0.9)
MONOCYTES NFR BLD AUTO: 6.9 % — SIGNIFICANT CHANGE UP (ref 2–14)
NEUTROPHILS # BLD AUTO: 4.06 K/UL — SIGNIFICANT CHANGE UP (ref 1.8–7.4)
NEUTROPHILS NFR BLD AUTO: 54.1 % — SIGNIFICANT CHANGE UP (ref 43–77)
NRBC # BLD: 0 /100 WBCS — SIGNIFICANT CHANGE UP (ref 0–0)
PLATELET # BLD AUTO: 168 K/UL — SIGNIFICANT CHANGE UP (ref 150–400)
RBC # BLD: 5.53 M/UL — SIGNIFICANT CHANGE UP (ref 4.2–5.8)
RBC # FLD: 13.9 % — SIGNIFICANT CHANGE UP (ref 10.3–14.5)
WBC # BLD: 7.51 K/UL — SIGNIFICANT CHANGE UP (ref 3.8–10.5)
WBC # FLD AUTO: 7.51 K/UL — SIGNIFICANT CHANGE UP (ref 3.8–10.5)

## 2022-12-16 PROCEDURE — 99213 OFFICE O/P EST LOW 20 MIN: CPT

## 2022-12-16 NOTE — ASSESSMENT
[FreeTextEntry1] : Markus Romero Jr is a 32 years old male with history of autism who has newly diagnosed stage Ia seminoma \par \par  He has stage Ia seminoma. Although his only LDH was 308, hCG and AFP were normal. LDH is nonspecific. NCCN also mentioned decisions regarding treatment should not be made on mildly elevated less than 3 upper limited of normal LDH alone. His LDH will be followed. He has stage Ia seminoma. The recurrence in 5 years is about 10% to 15%. If he is treated with radiation vs one or two doses of carboplatin auc 7, overtreatment is 85% to 90%.  He will be under surveillance given salvage treatment about 100%. He will have follow up in one month for LDH level, then every 3 months in the first year, every 6 months in the year 2 and 3, annually in the year 4 and 5. Surveillance images every 6 months in the first two years, annually in the year 3, 4 and 5. His LDH on Oct 27 was 325, however, hCG and AFP wnl. \par \par Plan\par check labs including LDH, hCG and AFP today\par will follow up with his labs including LDH\par RTC one month\par \par

## 2022-12-16 NOTE — HISTORY OF PRESENT ILLNESS
[Disease: _____________________] : Disease: [unfilled] [T: ___] : T[unfilled] [N: ___] : N[unfilled] [M: ___] : M[unfilled] [AJCC Stage: ____] : AJCC Stage: [unfilled] [Date: ____________] : Patient's last distress assessment performed on [unfilled]. [3 - Distress Level] : Distress Level: 3 [de-identified] : Markus Bazan 32 years old male with history of autism who initially presented left testicle pain and swelling on April 2022, US testicle showed a 2.7cm mass in the left testicle\par again on 9/26 on ED and admitted for left testicle pain and swelling \par \par US showed 4.0cm lesion\par CT abdomen and pelvis post orchiectomy showed postsurgical changes\par \par 9/25  hCG<1, AFP 3.3 and \par \par 9/27 left radical  orchiectomy, pathology showed seminoma, tumor invades rete testis, margins negative, LVI negative, pT1b tumor limited to the testis, pT1b\par \par  [de-identified] : seminoma  [de-identified] : 11/4/22 report burning while urinating, frequency \par \par 12/16/22 report burning while urinating and frequency. Denies abdominal pain. States sometimes difficulty urination, however, bedwetting occurs every other night including last night.

## 2022-12-20 LAB
ALBUMIN SERPL ELPH-MCNC: 4.2 G/DL
ALP BLD-CCNC: 115 U/L
ALT SERPL-CCNC: 115 U/L
ANION GAP SERPL CALC-SCNC: 15 MMOL/L
AST SERPL-CCNC: 51 U/L
BILIRUB SERPL-MCNC: 0.3 MG/DL
BUN SERPL-MCNC: 9 MG/DL
CALCIUM SERPL-MCNC: 9.8 MG/DL
CHLORIDE SERPL-SCNC: 97 MMOL/L
CO2 SERPL-SCNC: 24 MMOL/L
CREAT SERPL-MCNC: 0.98 MG/DL
EGFR: 104 ML/MIN/1.73M2
GLUCOSE SERPL-MCNC: 198 MG/DL
POTASSIUM SERPL-SCNC: 4.4 MMOL/L
PROT SERPL-MCNC: 6.2 G/DL
SODIUM SERPL-SCNC: 135 MMOL/L

## 2022-12-30 ENCOUNTER — EMERGENCY (EMERGENCY)
Facility: HOSPITAL | Age: 32
LOS: 1 days | Discharge: HOME | End: 2022-12-30
Attending: EMERGENCY MEDICINE
Payer: MEDICAID

## 2022-12-30 VITALS
HEART RATE: 90 BPM | DIASTOLIC BLOOD PRESSURE: 83 MMHG | HEIGHT: 71 IN | SYSTOLIC BLOOD PRESSURE: 115 MMHG | OXYGEN SATURATION: 96 % | TEMPERATURE: 98 F | WEIGHT: 307.1 LBS | RESPIRATION RATE: 19 BRPM

## 2022-12-30 PROCEDURE — 99283 EMERGENCY DEPT VISIT LOW MDM: CPT

## 2022-12-30 RX ORDER — MUPIROCIN 20 MG/G
1 OINTMENT TOPICAL ONCE
Refills: 0 | Status: COMPLETED | OUTPATIENT
Start: 2022-12-30 | End: 2022-12-30

## 2022-12-30 RX ADMIN — MUPIROCIN 1 APPLICATION(S): 20 OINTMENT TOPICAL at 20:51

## 2022-12-30 NOTE — ED PROVIDER NOTE - PATIENT PORTAL LINK FT
You can access the FollowMyHealth Patient Portal offered by [facility] by registering at the following website: [email or web address]. By joining [email or web address]’s FollowMyHealth portal, you will also be able to view your health information using other applications (apps) compatible with our system.

## 2022-12-30 NOTE — ED PROVIDER NOTE - OBJECTIVE STATEMENT
32 year old male with pmhx of intellectual disability and factitious disorder presents with evaluation of wound to left hand. States recently injured hand, however, discussion with garfield Diaz as well as EMS shows patient had same wound over 1 month. Continues pick at wound at group home. No fever, swelling or weakness.  Allergies: Hadol (rash)                Zyprexa (Dystonic RXN) 32 year old male with pmhx of Autism, intellectual disability and factitious disorder presents with evaluation of wound to left hand. States recently injured hand, however, discussion with aide Emily as well as EMR shows patient had same wound over 1 month. Continues pick at wound at group home per aide. No acute trauma. No fever, swelling or weakness.

## 2022-12-30 NOTE — ED PROVIDER NOTE - CLINICAL SUMMARY MEDICAL DECISION MAKING FREE TEXT BOX
32 year old male here for evaluation for chronic wound to hand. No sign of complication or infection. Give antibiotics ointment and discharge back to group home. 32 year old male here for evaluation for chronic wound to hand. No signs of infection or complication. Plan to apply antibiotic ointment and discharge back to group home.

## 2022-12-30 NOTE — ED PROVIDER NOTE - SKIN, MLM
Superficial ulceration of left hand to dorsal aspect overlaying first web space. No induration or discharge Superficial ulceration of left hand to dorsal aspect of first web space. No induration or discharge

## 2022-12-30 NOTE — ED PROVIDER NOTE - CROS ED ROS STATEMENT
Mother states pt will intermittently over the past 6 months c/o feeling like his heart is racing. Mother will put her hand on his chest and states you can feel it racing. More recently this is happening frequently. Mother called primary care who advised pt to come to ED. Currently pt is not experiencing this racing.   
all other ROS negative except as per HPI

## 2022-12-30 NOTE — ED PROVIDER NOTE - NSFOLLOWUPINSTRUCTIONS_ED_ALL_ED_FT
Wound Care, Adult      Taking care of your wound properly can help to prevent pain, infection, and scarring. It can also help your wound heal more quickly. Follow instructions from your health care provider about how to care for your wound.      Supplies needed:    •Soap and water.      •Wound cleanser, saline, or germ-free (sterile) water.      •Gauze.      •If needed, a clean bandage (dressing) or other type of wound dressing material to cover or place in the wound. Follow your health care provider's instructions about what dressing supplies to use.      •Cream or topical ointment to apply to the wound, if told by your health care provider.        How to care for your wound    Cleaning the wound     Ask your health care provider how to clean the wound. This may include:  •Using mild soap and water, a wound cleanser, saline, or sterile water.      •Using a clean gauze to pat the wound dry after cleaning it. Do not rub or scrub the wound.      Dressing care    •Wash your hands with soap and water for at least 20 seconds before and after you change the dressing. If soap and water are not available, use hand .    •Change your dressing as told by your health care provider. This may include:  •Cleaning or rinsing out (irrigating) the wound.      •Application of cream or topical ointment, if told by your health care provider.      •Placing a dressing over the wound or in the wound (packing).      •Covering the wound with an outer dressing.        •Leave stitches (sutures), staples, skin glue, or adhesive strips in place. These skin closures may need to stay in place for 2 weeks or longer. If adhesive strip edges start to loosen and curl up, you may trim the loose edges. Do not remove adhesive strips completely unless your health care provider tells you to do that.      •Ask your health care provider when you can leave the wound uncovered.        Checking for infection  Two stitched wounds. One is normal. The other is red with pus and infected.  Check your wound area every day for signs of infection. Check for:  •More redness, swelling, or pain.      •Fluid or blood.      •Warmth.      •Pus or a bad smell.        Follow these instructions at home    Medicines     •If you were prescribed an antibiotic medicine, cream, or ointment, take or apply it as told by your health care provider. Do not stop using the antibiotic even if your condition improves.      •If you were prescribed pain medicine, take it 30 minutes before you do any wound care or as told by your health care provider.      •Take over-the-counter and prescription medicines only as told by your health care provider.      Eating and drinking   •Eat a diet that includes protein, vitamin A, vitamin C, and other nutrient-rich foods to help the wound heal.  •Foods rich in protein include meat, fish, eggs, dairy, beans, and nuts.      •Foods rich in vitamin A include carrots and dark green, leafy vegetables.      •Foods rich in vitamin C include citrus fruits, tomatoes, broccoli, and peppers.        •Drink enough fluid to keep your urine pale yellow.      General instructions     • Do not take baths, swim, or use a hot tub until your health care provider approves. Ask your health care provider if you may take showers. You may only be allowed to take sponge baths.      • Do not scratch or pick at the wound. Keep it covered as told by your health care provider.      •Return to your normal activities as told by your health care provider. Ask your health care provider what activities are safe for you.      •Protect your wound from the sun when you are outside for the first 6 months, or for as long as told by your health care provider. Cover up the scar area or apply sunscreen that has an SPF of at least 30.      • Do not use any products that contain nicotine or tobacco. These products include cigarettes, chewing tobacco, and vaping devices, such as e-cigarettes. If you need help quitting, ask your health care provider.      •Keep all follow-up visits. This is important.        Contact a health care provider if:    •You received a tetanus shot and you have swelling, severe pain, redness, or bleeding at the injection site.      •Your pain is not controlled with medicine.    •You have any of these signs of infection:  •More redness, swelling, or pain around the wound.      •Fluid or blood coming from the wound.      •Warmth coming from the wound.      •A fever or chills.        •You are nauseous or you vomit.      •You are dizzy.      •You have a new rash or hardness around the wound.        Get help right away if:    •You have a red streak of skin near the area around your wound.      •Pus or a bad smell coming from the wound.      •Your wound has been closed with staples, sutures, skin glue, or adhesive strips and it begins to open up and separate.      •Your wound is bleeding, and the bleeding does not stop with gentle pressure.      These symptoms may represent a serious problem that is an emergency. Do not wait to see if the symptoms will go away. Get medical help right away. Call your local emergency services (911 in the [place] Do not drive yourself to the hospital.       Summary    •Always wash your hands with soap and water for at least 20 seconds before and after changing your dressing.      •Change your dressing as told by your health care provider.      •To help with healing, eat foods that are rich in protein, vitamin A, vitamin C, and other nutrients.      •Check your wound every day for signs of infection. Contact your health care provider if you think that your wound is infected.      This information is not intended to replace advice given to you by your health care provider. Make sure you discuss any questions you have with your health care provider.

## 2023-01-02 ENCOUNTER — EMERGENCY (EMERGENCY)
Facility: HOSPITAL | Age: 33
LOS: 1 days | Discharge: HOME | End: 2023-01-02
Attending: EMERGENCY MEDICINE
Payer: MEDICAID

## 2023-01-02 VITALS
HEIGHT: 71 IN | RESPIRATION RATE: 18 BRPM | HEART RATE: 88 BPM | DIASTOLIC BLOOD PRESSURE: 84 MMHG | TEMPERATURE: 98 F | SYSTOLIC BLOOD PRESSURE: 146 MMHG | OXYGEN SATURATION: 98 %

## 2023-01-02 VITALS
TEMPERATURE: 98 F | DIASTOLIC BLOOD PRESSURE: 76 MMHG | SYSTOLIC BLOOD PRESSURE: 118 MMHG | RESPIRATION RATE: 18 BRPM | HEART RATE: 85 BPM | OXYGEN SATURATION: 97 %

## 2023-01-02 LAB
ANION GAP SERPL CALC-SCNC: 6 MMOL/L — SIGNIFICANT CHANGE UP (ref 5–17)
BASOPHILS # BLD AUTO: 0.02 K/UL — SIGNIFICANT CHANGE UP (ref 0–0.2)
BASOPHILS NFR BLD AUTO: 0.3 % — SIGNIFICANT CHANGE UP (ref 0–2)
BUN SERPL-MCNC: 13 MG/DL — SIGNIFICANT CHANGE UP (ref 7–18)
CALCIUM SERPL-MCNC: 8.7 MG/DL — SIGNIFICANT CHANGE UP (ref 8.4–10.5)
CHLORIDE SERPL-SCNC: 103 MMOL/L — SIGNIFICANT CHANGE UP (ref 96–108)
CO2 SERPL-SCNC: 28 MMOL/L — SIGNIFICANT CHANGE UP (ref 22–31)
CREAT SERPL-MCNC: 0.88 MG/DL — SIGNIFICANT CHANGE UP (ref 0.5–1.3)
EGFR: 116 ML/MIN/1.73M2 — SIGNIFICANT CHANGE UP
EOSINOPHIL # BLD AUTO: 0.2 K/UL — SIGNIFICANT CHANGE UP (ref 0–0.5)
EOSINOPHIL NFR BLD AUTO: 3.4 % — SIGNIFICANT CHANGE UP (ref 0–6)
GLUCOSE SERPL-MCNC: 169 MG/DL — HIGH (ref 70–99)
HCT VFR BLD CALC: 40.9 % — SIGNIFICANT CHANGE UP (ref 39–50)
HGB BLD-MCNC: 13.1 G/DL — SIGNIFICANT CHANGE UP (ref 13–17)
IMM GRANULOCYTES NFR BLD AUTO: 0.9 % — SIGNIFICANT CHANGE UP (ref 0–0.9)
LYMPHOCYTES # BLD AUTO: 2.31 K/UL — SIGNIFICANT CHANGE UP (ref 1–3.3)
LYMPHOCYTES # BLD AUTO: 39.5 % — SIGNIFICANT CHANGE UP (ref 13–44)
MCHC RBC-ENTMCNC: 25.3 PG — LOW (ref 27–34)
MCHC RBC-ENTMCNC: 32 GM/DL — SIGNIFICANT CHANGE UP (ref 32–36)
MCV RBC AUTO: 79.1 FL — LOW (ref 80–100)
MONOCYTES # BLD AUTO: 0.54 K/UL — SIGNIFICANT CHANGE UP (ref 0–0.9)
MONOCYTES NFR BLD AUTO: 9.2 % — SIGNIFICANT CHANGE UP (ref 2–14)
NEUTROPHILS # BLD AUTO: 2.73 K/UL — SIGNIFICANT CHANGE UP (ref 1.8–7.4)
NEUTROPHILS NFR BLD AUTO: 46.7 % — SIGNIFICANT CHANGE UP (ref 43–77)
NRBC # BLD: 0 /100 WBCS — SIGNIFICANT CHANGE UP (ref 0–0)
PLATELET # BLD AUTO: 190 K/UL — SIGNIFICANT CHANGE UP (ref 150–400)
POTASSIUM SERPL-MCNC: 4.2 MMOL/L — SIGNIFICANT CHANGE UP (ref 3.5–5.3)
POTASSIUM SERPL-SCNC: 4.2 MMOL/L — SIGNIFICANT CHANGE UP (ref 3.5–5.3)
RBC # BLD: 5.17 M/UL — SIGNIFICANT CHANGE UP (ref 4.2–5.8)
RBC # FLD: 14.3 % — SIGNIFICANT CHANGE UP (ref 10.3–14.5)
SODIUM SERPL-SCNC: 137 MMOL/L — SIGNIFICANT CHANGE UP (ref 135–145)
WBC # BLD: 5.85 K/UL — SIGNIFICANT CHANGE UP (ref 3.8–10.5)
WBC # FLD AUTO: 5.85 K/UL — SIGNIFICANT CHANGE UP (ref 3.8–10.5)

## 2023-01-02 PROCEDURE — 85025 COMPLETE CBC W/AUTO DIFF WBC: CPT

## 2023-01-02 PROCEDURE — 73030 X-RAY EXAM OF SHOULDER: CPT | Mod: 26,LT

## 2023-01-02 PROCEDURE — 80048 BASIC METABOLIC PNL TOTAL CA: CPT

## 2023-01-02 PROCEDURE — 99284 EMERGENCY DEPT VISIT MOD MDM: CPT

## 2023-01-02 PROCEDURE — 36415 COLL VENOUS BLD VENIPUNCTURE: CPT

## 2023-01-02 PROCEDURE — 99283 EMERGENCY DEPT VISIT LOW MDM: CPT

## 2023-01-02 PROCEDURE — 73030 X-RAY EXAM OF SHOULDER: CPT

## 2023-01-02 NOTE — ED PROVIDER NOTE - HIV OFFER
Birth Control Pills Counseling: Birth Control Pill Counseling: I discussed with the patient the potential side effects of OCPs including but not limited to increased risk of stroke, heart attack, thrombophlebitis, deep venous thrombosis, hepatic adenomas, breast changes, GI upset, headaches, and depression.  The patient verbalized understanding of the proper use and possible adverse effects of OCPs. All of the patient's questions and concerns were addressed. Previously Negative (within the last year)

## 2023-01-02 NOTE — ED PROVIDER NOTE - PATIENT PORTAL LINK FT
You can access the FollowMyHealth Patient Portal offered by [facility] by registering at the following website: [email or web address]. By joining [employer]’s FollowMyHealth portal, you will also be able to view your health information using other applications (apps) compatible with our system.

## 2023-01-02 NOTE — ED PROVIDER NOTE - PHYSICAL EXAMINATION
General: Well appearing, no acute distress, appears stated age  HEENT: normocephalic, atraumatic   Respiratory: normal work of breathing  MSK: no swelling or tenderness of upper and lower extremities, moving all extremities spontaneously   Skin: warm, dry  Neuro: A&Ox3, cranial nerves II-XII intact, 5/5 strength in all extremities, no sensory deficits, normal gait   Psych: appropriate affect  ABD: soft, nontender, nondistended, no guarding, no rebound, no CVA tenderness

## 2023-01-02 NOTE — ED PROVIDER NOTE - NSFOLLOWUPINSTRUCTIONS_ED_ALL_ED_FT
Your blood level is normal so you are not having a worrisome bleed.  See a Gastroenterologist to further evaluate  Also your shoulder x-ray was normal.  Take tylenol if you have pain.    Thank you    Hemorrhoids    The colon, with 3 close-ups of the rectum. One is normal and the other 2 show external and internal hemorrhoids.   Hemorrhoids are swollen veins in and around the rectum or anus. There are two types of hemorrhoids:  •Internal hemorrhoids. These occur in the veins that are just inside the rectum. They may poke through to the outside and become irritated and painful.      •External hemorrhoids. These occur in the veins that are outside the anus and can be felt as a painful swelling or hard lump near the anus.      Most hemorrhoids do not cause serious problems, and they can be managed with home treatments such as diet and lifestyle changes. If home treatments do not help the symptoms, procedures can be done to shrink or remove the hemorrhoids.      What are the causes?    This condition is caused by increased pressure in the anal area. This pressure may result from various things, including:  •Constipation.      •Straining to have a bowel movement.      •Diarrhea.      •Pregnancy.      •Obesity.      •Sitting for long periods of time.      •Heavy lifting or other activity that causes you to strain.      •Anal sex.      •Riding a bike for a long period of time.        What are the signs or symptoms?    Symptoms of this condition include:  •Pain.      •Anal itching or irritation.      •Rectal bleeding.      •Leakage of stool (feces).      •Anal swelling.      •One or more lumps around the anus.        How is this diagnosed?    This condition can often be diagnosed through a visual exam. Other exams or tests may also be done, such as:  •An exam that involves feeling the rectal area with a gloved hand (digital rectal exam).      •An exam of the anal canal that is done using a small tube (anoscope).      •A blood test, if you have lost a significant amount of blood.      •A test to look inside the colon using a flexible tube with a camera on the end (sigmoidoscopy or colonoscopy).        How is this treated?    This condition can usually be treated at home. However, various procedures may be done if dietary changes, lifestyle changes, and other home treatments do not help your symptoms. These procedures can help make the hemorrhoids smaller or remove them completely. Some of these procedures involve surgery, and others do not. Common procedures include:  •Rubber band ligation. Rubber bands are placed at the base of the hemorrhoids to cut off their blood supply.      •Sclerotherapy. Medicine is injected into the hemorrhoids to shrink them.      •Infrared coagulation. A type of light energy is used to get rid of the hemorrhoids.      •Hemorrhoidectomy surgery. The hemorrhoids are surgically removed, and the veins that supply them are tied off.      •Stapled hemorrhoidopexy surgery. The surgeon staples the base of the hemorrhoid to the rectal wall.        Follow these instructions at home:      Eating and drinking   A sampling of vegetables and other plant-based foods.   •Eat foods that have a lot of fiber in them, such as whole grains, beans, nuts, fruits, and vegetables.      •Ask your health care provider about taking products that have added fiber (fiber supplements).      •Reduce the amount of fat in your diet. You can do this by eating low-fat dairy products, eating less red meat, and avoiding processed foods.      •Drink enough fluid to keep your urine pale yellow.        Managing pain and swelling   A bathtub partially filled with water.   •Take warm sitz baths for 20 minutes, 3–4 times a day to ease pain and discomfort. You may do this in a bathtub or using a portable sitz bath that fits over the toilet.    •If directed, apply ice to the affected area. Using ice packs between sitz baths may be helpful.  •Put ice in a plastic bag.      •Place a towel between your skin and the bag.      •Leave the ice on for 20 minutes, 2–3 times a day.        General instructions     •Take over-the-counter and prescription medicines only as told by your health care provider.      •Use medicated creams or suppositories as told.      •Get regular exercise. Ask your health care provider how much and what kind of exercise is best for you. In general, you should do moderate exercise for at least 30 minutes on most days of the week (150 minutes each week). This can include activities such as walking, biking, or yoga.      •Go to the bathroom when you have the urge to have a bowel movement. Do not wait.      •Avoid straining to have bowel movements.      •Keep the anal area dry and clean. Use wet toilet paper or moist towelettes after a bowel movement.      • Do not sit on the toilet for long periods of time. This increases blood pooling and pain.      •Keep all follow-up visits as told by your health care provider. This is important.        Contact a health care provider if you have:    •Increasing pain and swelling that are not controlled by treatment or medicine.      •Difficulty having a bowel movement, or you are unable to have a bowel movement.      •Pain or inflammation outside the area of the hemorrhoids.        Get help right away if you have:    •Uncontrolled bleeding from your rectum.        Summary    •Hemorrhoids are swollen veins in and around the rectum or anus.      •Most hemorrhoids can be managed with home treatments such as diet and lifestyle changes.      •Taking warm sitz baths can help ease pain and discomfort.      •In severe cases, procedures or surgery can be done to shrink or remove the hemorrhoids.      This information is not intended to replace advice given to you by your health care provider. Make sure you discuss any questions you have with your health care provider.

## 2023-01-02 NOTE — ED ADULT TRIAGE NOTE - CHIEF COMPLAINT QUOTE
Pt BIBA from group home complaining of rectal bleeding on toilet paper after wiping  also c/o LT shoulder pain from a previous incident with [facility]

## 2023-01-02 NOTE — ED PROVIDER NOTE - CARE PLAN
Principal Discharge DX:	BRBPR (bright red blood per rectum)  Secondary Diagnosis:	Left shoulder pain   DISPLAY PLAN FREE TEXT 1

## 2023-01-02 NOTE — ED PROVIDER NOTE - CLINICAL SUMMARY MEDICAL DECISION MAKING FREE TEXT BOX
patient without acute anemia, vitals normal, smiling inappropriately when rectal exam discussed, will defer rectal exam since vitals and labs normal  Pe of shoulder normal, FROM, no need for xray but will check for patietns anxiety

## 2023-01-03 ENCOUNTER — RESULT REVIEW (OUTPATIENT)
Age: 33
End: 2023-01-03

## 2023-01-03 ENCOUNTER — APPOINTMENT (OUTPATIENT)
Dept: HEMATOLOGY ONCOLOGY | Facility: CLINIC | Age: 33
End: 2023-01-03

## 2023-01-03 LAB
BASOPHILS # BLD AUTO: 0.03 K/UL — SIGNIFICANT CHANGE UP (ref 0–0.2)
BASOPHILS NFR BLD AUTO: 0.6 % — SIGNIFICANT CHANGE UP (ref 0–2)
EOSINOPHIL # BLD AUTO: 0.15 K/UL — SIGNIFICANT CHANGE UP (ref 0–0.5)
EOSINOPHIL NFR BLD AUTO: 2.8 % — SIGNIFICANT CHANGE UP (ref 0–6)
HCT VFR BLD CALC: 44.6 % — SIGNIFICANT CHANGE UP (ref 39–50)
HGB BLD-MCNC: 14.4 G/DL — SIGNIFICANT CHANGE UP (ref 13–17)
IMM GRANULOCYTES NFR BLD AUTO: 0.9 % — SIGNIFICANT CHANGE UP (ref 0–0.9)
LYMPHOCYTES # BLD AUTO: 2.12 K/UL — SIGNIFICANT CHANGE UP (ref 1–3.3)
LYMPHOCYTES # BLD AUTO: 39.4 % — SIGNIFICANT CHANGE UP (ref 13–44)
MCHC RBC-ENTMCNC: 25.7 PG — LOW (ref 27–34)
MCHC RBC-ENTMCNC: 32.3 G/DL — SIGNIFICANT CHANGE UP (ref 32–36)
MCV RBC AUTO: 79.5 FL — LOW (ref 80–100)
MONOCYTES # BLD AUTO: 0.47 K/UL — SIGNIFICANT CHANGE UP (ref 0–0.9)
MONOCYTES NFR BLD AUTO: 8.7 % — SIGNIFICANT CHANGE UP (ref 2–14)
NEUTROPHILS # BLD AUTO: 2.56 K/UL — SIGNIFICANT CHANGE UP (ref 1.8–7.4)
NEUTROPHILS NFR BLD AUTO: 47.6 % — SIGNIFICANT CHANGE UP (ref 43–77)
NRBC # BLD: 0 /100 WBCS — SIGNIFICANT CHANGE UP (ref 0–0)
PLATELET # BLD AUTO: 198 K/UL — SIGNIFICANT CHANGE UP (ref 150–400)
RBC # BLD: 5.61 M/UL — SIGNIFICANT CHANGE UP (ref 4.2–5.8)
RBC # FLD: 14.1 % — SIGNIFICANT CHANGE UP (ref 10.3–14.5)
WBC # BLD: 5.38 K/UL — SIGNIFICANT CHANGE UP (ref 3.8–10.5)
WBC # FLD AUTO: 5.38 K/UL — SIGNIFICANT CHANGE UP (ref 3.8–10.5)

## 2023-01-06 LAB
ALBUMIN SERPL ELPH-MCNC: 4.1 G/DL
ALP BLD-CCNC: 112 U/L
ALT SERPL-CCNC: 121 U/L
ANION GAP SERPL CALC-SCNC: 16 MMOL/L
AST SERPL-CCNC: 67 U/L
BILIRUB SERPL-MCNC: 0.3 MG/DL
BUN SERPL-MCNC: 13 MG/DL
CALCIUM SERPL-MCNC: 9.6 MG/DL
CHLORIDE SERPL-SCNC: 99 MMOL/L
CO2 SERPL-SCNC: 24 MMOL/L
CREAT SERPL-MCNC: 0.8 MG/DL
EGFR: 120 ML/MIN/1.73M2
GLUCOSE SERPL-MCNC: 178 MG/DL
LDH SERPL-CCNC: 299 U/L
POTASSIUM SERPL-SCNC: 4.3 MMOL/L
PROT SERPL-MCNC: 6.3 G/DL
SODIUM SERPL-SCNC: 138 MMOL/L

## 2023-01-06 NOTE — PROGRESS NOTE BEHAVIORAL HEALTH - RECENT MEMORY
Body Location Override (Optional): Right Crease of Helix
Which Doctor Performed Mohs? (Optional): 
Normal

## 2023-01-09 ENCOUNTER — APPOINTMENT (OUTPATIENT)
Dept: HEMATOLOGY ONCOLOGY | Facility: CLINIC | Age: 33
End: 2023-01-09
Payer: MEDICAID

## 2023-01-11 ENCOUNTER — APPOINTMENT (OUTPATIENT)
Dept: HEMATOLOGY ONCOLOGY | Facility: CLINIC | Age: 33
End: 2023-01-11
Payer: MEDICAID

## 2023-01-11 VITALS
HEIGHT: 70 IN | WEIGHT: 302.03 LBS | HEART RATE: 93 BPM | DIASTOLIC BLOOD PRESSURE: 84 MMHG | RESPIRATION RATE: 16 BRPM | OXYGEN SATURATION: 98 % | SYSTOLIC BLOOD PRESSURE: 125 MMHG | BODY MASS INDEX: 43.24 KG/M2 | TEMPERATURE: 98 F

## 2023-01-11 PROCEDURE — 99213 OFFICE O/P EST LOW 20 MIN: CPT

## 2023-01-11 NOTE — HISTORY OF PRESENT ILLNESS
[de-identified] : Markus Bazan 32 years old male with history of autism who initially presented left testicle pain and swelling on April 2022, US testicle showed a 2.7cm mass in the left testicle\par again on 9/26 on ED and admitted for left testicle pain and swelling \par \par US showed 4.0cm lesion\par CT abdomen and pelvis post orchiectomy showed postsurgical changes\par \par 9/25  hCG<1, AFP 3.3 and \par \par 9/27 left radical  orchiectomy, pathology showed seminoma, tumor invades rete testis, margins negative, LVI negative, pT1b tumor limited to the testis, pT1b\par \par  [de-identified] : seminoma  [de-identified] : 11/4/22 report burning while urinating, frequency \par \par 12/16/22 report burning while urinating and frequency. Denies abdominal pain. States sometimes difficulty urination, however, bedwetting occurs every other night including last night. \par \par 1/11/23 went to ED last night [facility] because of chest tightness and wheezing with history of asthma. Reports burning urination, pending UA at PCP, no fever. .

## 2023-01-11 NOTE — ASSESSMENT
[FreeTextEntry1] : Markus Romero Jr is a 32 years old male with history of autism who has newly diagnosed stage Ia seminoma \par \par  He has stage Ia seminoma. Although his only LDH was 308, hCG and AFP were normal. LDH is nonspecific. NCCN also mentioned decisions regarding treatment should not be made on mildly elevated less than 3 upper limited of normal LDH alone. His LDH will be followed. He has stage Ia seminoma. The recurrence in 5 years is about 10% to 15%. If he is treated with radiation vs one or two doses of carboplatin auc 7, overtreatment is 85% to 90%.  He will be under surveillance given salvage treatment about 100%. He will have follow up in one month for LDH level, then every 3 months in the first year, every 6 months in the year 2 and 3, annually in the year 4 and 5. Surveillance images every 6 months in the first two years, annually in the year 3, 4 and 5. His LDH on Oct 27 was 325, however, hCG and AFP wnl. Andrews 3, . \par \par Plan\par check labs including LDH, hCG and AFP today\par will have CT abdomen/pelvis w/con\par will follow up with his labs including LDH\par will continue to hold off lipitor given his mildly elevated LFT\par RTC one month\par \par Mom contact [phone]\par \par

## 2023-01-15 ENCOUNTER — EMERGENCY (EMERGENCY)
Facility: HOSPITAL | Age: 33
LOS: 1 days | Discharge: HOME | End: 2023-01-15
Attending: STUDENT IN AN ORGANIZED HEALTH CARE EDUCATION/TRAINING PROGRAM
Payer: MEDICAID

## 2023-01-15 VITALS
OXYGEN SATURATION: 98 % | SYSTOLIC BLOOD PRESSURE: 125 MMHG | WEIGHT: 298.95 LBS | RESPIRATION RATE: 18 BRPM | DIASTOLIC BLOOD PRESSURE: 86 MMHG | HEART RATE: 84 BPM | TEMPERATURE: 98 F | HEIGHT: 71 IN

## 2023-01-15 PROCEDURE — 99284 EMERGENCY DEPT VISIT MOD MDM: CPT

## 2023-01-15 PROCEDURE — 99283 EMERGENCY DEPT VISIT LOW MDM: CPT

## 2023-01-15 PROCEDURE — 73110 X-RAY EXAM OF WRIST: CPT | Mod: 26,LT

## 2023-01-15 PROCEDURE — 73110 X-RAY EXAM OF WRIST: CPT

## 2023-01-15 NOTE — ED PROVIDER NOTE - OBJECTIVE STATEMENT
32M, pmh of Autism, intellectual disability and factitious disorder, Presenting with left wrist pain.  Patient reports he bent his wrist or possibly slammed his wrist down and heard a pop and since then has had pain.  No numbness or tingling in his fingers.

## 2023-01-15 NOTE — ED PROVIDER NOTE - PHYSICAL EXAMINATION
General: well appearing male, no acute distress   HEENT: normocephalic, atraumatic   Respiratory: normal work of breathing  Cardiac: 2+ left radial pulse   MSK: left wrist tenderness to palpation, full ROM  Skin: warm, dry   Neuro: A&Ox3  Psych: appropriate affect

## 2023-01-15 NOTE — ED ADULT NURSE NOTE - OBJECTIVE STATEMENT
pt awake and oriented x 4. pt c/o wrist pain. side rails up for safety. bed locked to lowest position. nad noted.

## 2023-01-15 NOTE — ED PROVIDER NOTE - NSFOLLOWUPINSTRUCTIONS_ED_ALL_ED_FT
You were seen in the emergency department for wrist pain.    Please follow-up with your primary care doctor in the next 24 to 48 hours.    Please take Tylenol and ibuprofen as prescribed on the bottles for symptom control.    If you have any worsening symptoms, severe wrist pain, numbness or tingling of your hand, please return to the emergency department.

## 2023-01-15 NOTE — ED PROVIDER NOTE - CLINICAL SUMMARY MEDICAL DECISION MAKING FREE TEXT BOX
32-year-old male presenting with left wrist pain.  Patient reports tenderness on exam but then when demonstrating how he injured his hand is able to fully flex his wrist.  X-ray clear on my interpretation.  Will give Velcro splint as patient is symptomatic.  Patient declines pain medication at this time.  Stable for discharge with caretakers.

## 2023-01-15 NOTE — ED PROVIDER NOTE - PATIENT PORTAL LINK FT
You can access the FollowMyHealth Patient Portal offered by [facility] by registering at the following website: [email or web address]. By joining [employer]’s FollowMyHealth portal, you will also be able to view your health information using other applications (apps) compatible with our system.

## 2023-01-30 ENCOUNTER — APPOINTMENT (OUTPATIENT)
Dept: CT IMAGING | Facility: IMAGING CENTER | Age: 33
End: 2023-01-30
Payer: MEDICAID

## 2023-01-30 ENCOUNTER — OUTPATIENT (OUTPATIENT)
Dept: OUTPATIENT SERVICES | Facility: HOSPITAL | Age: 33
LOS: 1 days | End: 2023-01-30
Payer: MEDICAID

## 2023-01-30 ENCOUNTER — RESULT REVIEW (OUTPATIENT)
Age: 33
End: 2023-01-30

## 2023-01-30 DIAGNOSIS — C62.12 MALIGNANT NEOPLASM OF DESCENDED LEFT TESTIS: ICD-10-CM

## 2023-01-30 PROCEDURE — 76937 US GUIDE VASCULAR ACCESS: CPT | Mod: 26

## 2023-01-30 PROCEDURE — 74177 CT ABD & PELVIS W/CONTRAST: CPT | Mod: 26

## 2023-01-30 PROCEDURE — 36410 VNPNXR 3YR/> PHY/QHP DX/THER: CPT

## 2023-01-30 PROCEDURE — 76937 US GUIDE VASCULAR ACCESS: CPT

## 2023-01-30 PROCEDURE — 74177 CT ABD & PELVIS W/CONTRAST: CPT

## 2023-02-07 NOTE — ED ADULT NURSE NOTE - CHPI ED NUR SYMPTOMS POS
CHEST PAIN Propranolol Pregnancy And Lactation Text: This medication is Pregnancy Category C and it isn't known if it is safe during pregnancy. It is excreted in breast milk.

## 2023-02-08 ENCOUNTER — EMERGENCY (EMERGENCY)
Facility: HOSPITAL | Age: 33
LOS: 1 days | Discharge: HOME | End: 2023-02-08
Attending: EMERGENCY MEDICINE
Payer: MEDICAID

## 2023-02-08 VITALS
HEIGHT: 71 IN | TEMPERATURE: 98 F | OXYGEN SATURATION: 99 % | HEART RATE: 87 BPM | RESPIRATION RATE: 18 BRPM | DIASTOLIC BLOOD PRESSURE: 75 MMHG | SYSTOLIC BLOOD PRESSURE: 113 MMHG | WEIGHT: 298.95 LBS

## 2023-02-08 VITALS
TEMPERATURE: 98 F | SYSTOLIC BLOOD PRESSURE: 117 MMHG | OXYGEN SATURATION: 97 % | DIASTOLIC BLOOD PRESSURE: 84 MMHG | HEART RATE: 84 BPM | RESPIRATION RATE: 18 BRPM

## 2023-02-08 DIAGNOSIS — F84.0 AUTISTIC DISORDER: ICD-10-CM

## 2023-02-08 LAB
ALBUMIN SERPL ELPH-MCNC: 3.8 G/DL — SIGNIFICANT CHANGE UP (ref 3.5–5)
ALP SERPL-CCNC: 114 U/L — SIGNIFICANT CHANGE UP (ref 40–120)
ALT FLD-CCNC: 196 U/L DA — HIGH (ref 10–60)
ANION GAP SERPL CALC-SCNC: 8 MMOL/L — SIGNIFICANT CHANGE UP (ref 5–17)
AST SERPL-CCNC: 97 U/L — HIGH (ref 10–40)
BASOPHILS # BLD AUTO: 0.04 K/UL — SIGNIFICANT CHANGE UP (ref 0–0.2)
BASOPHILS NFR BLD AUTO: 0.7 % — SIGNIFICANT CHANGE UP (ref 0–2)
BILIRUB SERPL-MCNC: 0.3 MG/DL — SIGNIFICANT CHANGE UP (ref 0.2–1.2)
BUN SERPL-MCNC: 14 MG/DL — SIGNIFICANT CHANGE UP (ref 7–18)
CALCIUM SERPL-MCNC: 9.4 MG/DL — SIGNIFICANT CHANGE UP (ref 8.4–10.5)
CHLORIDE SERPL-SCNC: 101 MMOL/L — SIGNIFICANT CHANGE UP (ref 96–108)
CO2 SERPL-SCNC: 27 MMOL/L — SIGNIFICANT CHANGE UP (ref 22–31)
CREAT SERPL-MCNC: 1.05 MG/DL — SIGNIFICANT CHANGE UP (ref 0.5–1.3)
EGFR: 96 ML/MIN/1.73M2 — SIGNIFICANT CHANGE UP
EOSINOPHIL # BLD AUTO: 0.24 K/UL — SIGNIFICANT CHANGE UP (ref 0–0.5)
EOSINOPHIL NFR BLD AUTO: 4 % — SIGNIFICANT CHANGE UP (ref 0–6)
ETHANOL SERPL-MCNC: <3 MG/DL — SIGNIFICANT CHANGE UP (ref 0–10)
GLUCOSE SERPL-MCNC: 159 MG/DL — HIGH (ref 70–99)
HCT VFR BLD CALC: 49.8 % — SIGNIFICANT CHANGE UP (ref 39–50)
HGB BLD-MCNC: 15.6 G/DL — SIGNIFICANT CHANGE UP (ref 13–17)
IMM GRANULOCYTES NFR BLD AUTO: 1 % — HIGH (ref 0–0.9)
LIDOCAIN IGE QN: 182 U/L — SIGNIFICANT CHANGE UP (ref 73–393)
LYMPHOCYTES # BLD AUTO: 2.28 K/UL — SIGNIFICANT CHANGE UP (ref 1–3.3)
LYMPHOCYTES # BLD AUTO: 37.6 % — SIGNIFICANT CHANGE UP (ref 13–44)
MCHC RBC-ENTMCNC: 25.3 PG — LOW (ref 27–34)
MCHC RBC-ENTMCNC: 31.3 GM/DL — LOW (ref 32–36)
MCV RBC AUTO: 80.7 FL — SIGNIFICANT CHANGE UP (ref 80–100)
MONOCYTES # BLD AUTO: 0.5 K/UL — SIGNIFICANT CHANGE UP (ref 0–0.9)
MONOCYTES NFR BLD AUTO: 8.3 % — SIGNIFICANT CHANGE UP (ref 2–14)
NEUTROPHILS # BLD AUTO: 2.94 K/UL — SIGNIFICANT CHANGE UP (ref 1.8–7.4)
NEUTROPHILS NFR BLD AUTO: 48.4 % — SIGNIFICANT CHANGE UP (ref 43–77)
NRBC # BLD: 0 /100 WBCS — SIGNIFICANT CHANGE UP (ref 0–0)
PLATELET # BLD AUTO: 185 K/UL — SIGNIFICANT CHANGE UP (ref 150–400)
POTASSIUM SERPL-MCNC: 3.9 MMOL/L — SIGNIFICANT CHANGE UP (ref 3.5–5.3)
POTASSIUM SERPL-SCNC: 3.9 MMOL/L — SIGNIFICANT CHANGE UP (ref 3.5–5.3)
PROT SERPL-MCNC: 7.6 G/DL — SIGNIFICANT CHANGE UP (ref 6–8.3)
RBC # BLD: 6.17 M/UL — HIGH (ref 4.2–5.8)
RBC # FLD: 13.9 % — SIGNIFICANT CHANGE UP (ref 10.3–14.5)
SODIUM SERPL-SCNC: 136 MMOL/L — SIGNIFICANT CHANGE UP (ref 135–145)
WBC # BLD: 6.06 K/UL — SIGNIFICANT CHANGE UP (ref 3.8–10.5)
WBC # FLD AUTO: 6.06 K/UL — SIGNIFICANT CHANGE UP (ref 3.8–10.5)

## 2023-02-08 PROCEDURE — 90792 PSYCH DIAG EVAL W/MED SRVCS: CPT | Mod: 95

## 2023-02-08 PROCEDURE — 99285 EMERGENCY DEPT VISIT HI MDM: CPT | Mod: 25

## 2023-02-08 PROCEDURE — 80053 COMPREHEN METABOLIC PANEL: CPT

## 2023-02-08 PROCEDURE — 83690 ASSAY OF LIPASE: CPT

## 2023-02-08 PROCEDURE — 85025 COMPLETE CBC W/AUTO DIFF WBC: CPT

## 2023-02-08 PROCEDURE — 99285 EMERGENCY DEPT VISIT HI MDM: CPT

## 2023-02-08 PROCEDURE — 80307 DRUG TEST PRSMV CHEM ANLYZR: CPT

## 2023-02-08 PROCEDURE — 36415 COLL VENOUS BLD VENIPUNCTURE: CPT

## 2023-02-08 RX ORDER — SODIUM CHLORIDE 9 MG/ML
1000 INJECTION INTRAMUSCULAR; INTRAVENOUS; SUBCUTANEOUS ONCE
Refills: 0 | Status: DISCONTINUED | OUTPATIENT
Start: 2023-02-08 | End: 2023-02-08

## 2023-02-08 RX ORDER — METOCLOPRAMIDE HCL 10 MG
10 TABLET ORAL ONCE
Refills: 0 | Status: DISCONTINUED | OUTPATIENT
Start: 2023-02-08 | End: 2023-02-12

## 2023-02-08 RX ORDER — ONDANSETRON 8 MG/1
1 TABLET, FILM COATED ORAL
Qty: 10 | Refills: 0
Start: 2023-02-08

## 2023-02-08 RX ORDER — KETOROLAC TROMETHAMINE 30 MG/ML
30 SYRINGE (ML) INJECTION ONCE
Refills: 0 | Status: DISCONTINUED | OUTPATIENT
Start: 2023-02-08 | End: 2023-02-08

## 2023-02-08 NOTE — ED PROVIDER NOTE - PATIENT PORTAL LINK FT
You can access the FollowMyHealth Patient Portal offered by [facility] by registering at the following website: [email or web address]. By joining [employer]’s FollowMyHealth portal, you will also be able to view your health information using other applications (apps) compatible with our system.

## 2023-02-08 NOTE — ED BEHAVIORAL HEALTH ASSESSMENT NOTE - MEDICATIONS (PRESCRIPTIONS, DIRECTIONS)
Recommend pt continue with his current med regimen and discuss potential med changes with his outpt provider

## 2023-02-08 NOTE — ED BEHAVIORAL HEALTH ASSESSMENT NOTE - HPI (INCLUDE ILLNESS QUALITY, SEVERITY, DURATION, TIMING, CONTEXT, MODIFYING FACTORS, ASSOCIATED SIGNS AND SYMPTOMS)
Pt is a 31 yo M, single, disabled, non-caregiver, resides at a [facility], with psych h/o moderated ID, ASD, unspecified personality disorders, impulse control disorders, conduct disorder, factitious disorder, HAVEN, mood disorders, PTSD and ADHD, multiple past psych admissions (last known [facility] 12/19-12/23/21), numerous ED visits for both medical/psych complaints, longstanding h/o SIB (head banging, cutting), but no known SA, longstanding h/o endorsing SI and then retracting, h/o aggression toward staff at , [facility] significant for asthma, DM, urinary retention, GERD, BPH, hypothyroidism, HLD, HTN, and b/l myopia, Pt is a 31 yo M, single, disabled, non-caregiver, resides at a [facility], with psych h/o moderated ID, ASD, unspecified personality disorders, impulse control disorders, conduct disorder, factitious disorder, HAVEN, mood disorders, PTSD and ADHD, multiple past psych admissions (last known [facility] 12/19-12/23/21), numerous ED visits for both medical/psych complaints, longstanding h/o SIB (head banging, cutting), but no known SA, longstanding h/o endorsing SI and then retracting, h/o aggression toward staff at , [facility] significant for asthma, DM, urinary retention, GERD, BPH, hypothyroidism, HLD, HTN, and b/l myopia, on Depakote 500 mg BID, Guanfacine 2 mg, Risperdal 4 mg, and Remeron 15 mg, prescribed by his PCP, who presents to the ED for abdominal pain and while in the ED he also expressed SI/HI to staff.     On assessment, pt presents as concrete, perseverative, and oddly related, but is otherwise calm, euthymic, and future-oriented with no e/o internal preoccupation or agitation, and states "I want to do inpatient". When asked whether he means psychiatric vs medical, he replies "wherever you can put me" and reports he's chronically dissatisfied with his  since he doesn't get along with staff. He reports that in the setting of feeling stressed about his living situation and a recent break up with his fiance, he's felt stressed, often hurts himself to express his frustration(not to kill himself), and has been chronically suicidal, but denies method, plan, or intent, and denies any pSA. When this writer asked about other psychiatric symptoms, the pt pauses, and then repeats "I want to do inpatient". However, upon further probing, he denies current or recent HI, A/VH, or PI and on exam, he exhibits no neurovegetative symptoms evident of a mood or thought disorder. Pt gave his consent to telepsych team to speak with the aide who accompanied him.    This writer spoke with the pt's aide, Emily([phone]), and per his collateral: The pt has a history of frequently calling EMS to be taken to the hospital for various med/psych complaints and he has been recently functioning at baseline. He has a longstanding h/o expressing SI and then retracting, he has not made any known suicide attempts, and he has not exhibited signs or symptoms of A/VH or PI. He does not currently have a psychiatrist, but has been taking the above meds. Vani feels safe taking patient home and confirms she will return pt to the ED if there were any acute safety concerns.

## 2023-02-08 NOTE — ED BEHAVIORAL HEALTH NOTE - BEHAVIORAL HEALTH NOTE
==================             PRE-HOSPITAL COURSE             ===================            SOURCE:  Secondhand EMR documentation.             DETAILS:  Patient BIBMES; chief complaint of abd pain.         ===========      ED COURSE:            ===========             SOURCE:  RN and secondhand EMR documentation.              ARRIVAL:  Patient noted to be cooperative with ED protocol. Patient gowned, wanded, and placed in private room on 1:1 for consult. Patient presents with good hygiene/grooming.              BELONGINGS:  None notable.             BEHAVIOR: Blood provided for routine labs without noted incident. Patient endorsed SI/HI upon assessment, no AH/VH noted, no SI/HI/AH/VH endorsed to present RN. Patient is alert, oriented; speech of normal volume and rate accompanied by logical thought process. Patient has been in hospital bed while in ED; presents as calm and resting in bed.     TREATMENT: Patient received Reglan 10mg IV push.     Visitors: Patient presently accompanied by aide while in ED.

## 2023-02-08 NOTE — ED PROVIDER NOTE - NSFOLLOWUPINSTRUCTIONS_ED_ALL_ED_FT
1. For your stomach bug, drink a lot of fluids- gatorade, coconut water, vitamin water etc. 1-3 Liters a day.  2.  I sent zofran to your pharmacy to help with the nausea.  3. For your depression, please refer to the references sent over from the psychiatry department.        Depression    WHAT YOU NEED TO KNOW:    Depression is a medical condition that causes feelings of sadness or hopelessness that do not go away. Depression may cause you to lose interest in things you used to enjoy. These feelings may interfere with your daily life.    DISCHARGE INSTRUCTIONS:    Call your local emergency number (911 in the ) if:     You think about harming yourself or someone else.      You have done something on purpose to hurt yourself.    Call your therapist or doctor if:     Your symptoms do not improve.      You cannot make it to your next appointment.       You have new symptoms.      You have questions or concerns about your condition or care.    The following resources are available at any time to help you, if needed:     National Suicide Prevention Lifeline: 1-614.761.1702 (8-465-966-TALK)      Suicide Hotline: 1-237.877.8562 (8-237-AINQKRR)      For a list of international numbers: https://save.org/find-help/international-resources/    Medicines:     Antidepressants may be given to improve or balance your mood. You may need to take this medicine for several weeks before you begin to feel better.      Take your medicine as directed. Contact your healthcare provider if you think your medicine is not helping or if you have side effects. Tell him of her if you are allergic to any medicine. Keep a list of the medicines, vitamins, and herbs you take. Include the amounts, and when and why you take them. Bring the list or the pill bottles to follow-up visits. Carry your medicine list with you in case of an emergency.    Therapy is often used together with medicine to relieve depression. Therapy is a way for you to talk about your feelings and anything that may be causing depression. Therapy can be done alone or in a group. It may also be done with family members or a significant other.    Self-care:     Get regular physical activity. Try to be active for 30 minutes, 3 to 5 days a week. Physical activity can help relieve depression. Work with your healthcare provider to develop a plan that you enjoy. It may help to ask someone to be active with you.      Create a regular sleep schedule. A routine can help you relax before bed. Listen to music, read, or do yoga. Try to go to bed and wake up at the same time every day. Sleep is important for emotional health.      Eat a variety of healthy foods. Healthy foods include fruits, vegetables, whole-grain breads, low-fat dairy products, lean meats, fish, and cooked beans. A healthy meal plan is low in fat, salt, and added sugar.      Do not drink alcohol or use drugs. Alcohol and drugs can make depression worse. Talk to your therapist or doctor if you need help quitting.    Follow up with your healthcare provider as directed: Your healthcare provider will monitor your progress at follow-up visits. He or she will also monitor your medicine if you take antidepressants. Your healthcare provider will ask if the medicine is helping. Tell him or her about any side effects or problems you may have with your medicine. The type or amount of medicine may need to be changed. Write down your questions so you remember to ask them during your visits.      Viral Gastroenteritis, Adult    Viral gastroenteritis is also known as the stomach flu. This condition is caused by various viruses. These viruses can be passed from person to person very easily (are very contagious). It can cause sudden watery diarrhea, fever, and vomiting.    Diarrhea and vomiting can make you feel weak and cause you to become dehydrated. You may not be able to keep fluids down. Dehydration can make you tired and thirsty, cause you to have a dry mouth, and decrease how often you urinate. Older adults and people with other diseases or a weak immune system are at higher risk for dehydration.    It is important to replace the fluids that you lose from diarrhea and vomiting. If you become severely dehydrated, you may need to get fluids through an IV tube.    What are the causes?  Gastroenteritis is caused by various viruses, including rotavirus and norovirus. Norovirus is the most common cause in adults.  You can get sick by eating food, drinking water, or touching a surface contaminated with one of these viruses. You can also get sick from sharing utensils or other personal items with an infected person.    How is this diagnosed?  This condition is diagnosed with a medical history and physical exam. You may also have a stool test to check for viruses or other infections.    How is this treated?  This condition typically goes away on its own. The focus of treatment is to restore lost fluids (rehydration). Your health care provider may recommend that you take an oral rehydration solution (ORS) to replace important salts and minerals (electrolytes) in your body. Severe cases of this condition may require giving fluids through an IV tube.  Treatment may also include medicine to help with your symptoms.    Follow these instructions at home:    -Take an ORS- Oral Rehydration Supplement. This is a drink that is sold at pharmacies and retail stores.   - Drink clear fluids in small amounts as you are able. Clear fluids include water, ice chips, diluted fruit juice, and low-calorie sports drinks.   -Eat bland, easy-to-digest foods in small amounts as you are able. These foods include bananas, applesauce, rice, lean meats, toast, and crackers.   - Avoid fluids that contain a lot of sugar or caffeine, such as energy drinks, sports drinks, and soda.   - Avoid alcohol.   - Avoid spicy or fatty foods.    - Drink enough fluid to keep your urine clear or pale yellow.  -Wash your hands often. If soap and water are not available, use hand .  - Make sure that all people in your household wash their hands well and often.  - Take over-the-counter and prescription medicines only as told by your health care provider.  - Rest at home while you recover.  - Watch your condition for any changes.  - Take a warm bath to relieve any burning or pain from frequent diarrhea episodes.  - Keep all follow-up visits as told by your health care provider. This is important.     Contact a health care provider if:  - You cannot keep fluids down.  - Your symptoms get worse.  - You have new symptoms.   - You feel light-headed or dizzy.   - You have muscle cramps.     Get help right away if:  You have chest pain. You feel extremely weak or you faint. You see blood in your vomit .Your vomit looks like coffee grounds. You have bloody or black stools or stools that look like tar. You have a severe headache, a stiff neck, or both. You have a rash. You have severe pain, cramping, or bloating in your abdomen. You have trouble breathing or you are breathing very quickly. Your heart is beating very quickly. Your skin feels cold and clammy. You feel confused. You have pain when you urinate.   You have signs of dehydration, such as: Dark urine, very little urine, or no urine. Cracked lips. Dry mouth. Sunken eyes .Sleepiness. Weakness.   This information is not intended to replace advice given to you by your health care provider. Make sure you discuss any questions you have with your health care provider.

## 2023-02-08 NOTE — ED PROVIDER NOTE - CLINICAL SUMMARY MEDICAL DECISION MAKING FREE TEXT BOX
Patient with suicidal homicidal ideation got up this might be true psychiatric disorder given his recent break-up with delmy, he did attempt to cut his wrist 2 weeks ago however this may be fictitious disorder.  Patient did not tell the triage nurse about the symptoms.  His presenting symptoms were nausea abdominal pain.  Abdomen soft nontender this is likely viral gastroenteritis.  Will treat with antiemetics IV fluids consult telepsych reassess.

## 2023-02-08 NOTE — ED PROVIDER NOTE - PHYSICAL EXAMINATION
General: Well appearing, no acute distress, appears stated age  HEENT: normocephalic, atraumatic   Respiratory: normal work of breathing  MSK: no swelling or tenderness of lower extremities, moving all extremities spontaneously   Skin: warm, dry, laceration healed on left wrist, punch abrasaions healed on right wrist   Neuro: A&Ox3, cranial nerves II-XII intact, 5/5 strength in all extremities, no sensory deficits, normal gait   Psych: states he is suicidal and homicidal without a plan, has large smile on his face each time he is questioned about is ideation  ABD: soft, nontender, nondistended, no guarding, no rebound, no CVA tenderness

## 2023-02-08 NOTE — CHART NOTE - NSCHARTNOTEFT_GEN_A_CORE
Verbal referral received, Pt requesting to speak with martha.   Pt is a 31 year old  male who was brought to the ED from [facility] with the complaint of abdominal pain. Pt was visited at bedside re request, he appeared alert and oriented x3. Martha introduced self, role and purpose of visit explained. Pt reports suicidal and homicidal ideations but has no plan.  He states his trigger was that his fiancée broke up with him 2 months ago and has been  depressed constantly since that time. Pt was seen by tele psych and was treated and released. Emotional support provided. D/c was coordinated with Pt 's respite, Emily ( [phone] ) and Pt was d/cd to her. Emily would transport Pt back to the [facility] home. Pt was socially cleared and  Physician made aware.

## 2023-02-08 NOTE — ED BEHAVIORAL HEALTH ASSESSMENT NOTE - PAST PSYCHOTROPIC MEDICATION
Zyprexa,  Haloperidol, Clonidine, Topamax, Klonopin, Seroquel, Thorazine, Abilify, Trazodone, Gabapentin, Tegetrol, Trileptal, Perphenazine, Prozac, Lithium, Zoloft and Hydroxyzine.

## 2023-02-08 NOTE — ED PROVIDER NOTE - NS ED ROS FT
Pt denies fevers, chills  chest pain, palpitations  shortness of breath, orthopnea  melena,   dysuria, hematuria   numbness, weakness, saddle anesthesia  rash  enlarged lymph nodes

## 2023-02-08 NOTE — ED BEHAVIORAL HEALTH ASSESSMENT NOTE - SUMMARY
Pt is a 33 yo M, single, disabled, non-caregiver, resides at a [facility], with psych h/o moderate intellectual disability, ASD, unspecified personality disorders, impulse control disorders, conduct disorder, factitious disorder, HAVEN, mood disorders, PTSD and ADHD, multiple past psych admissions (last known [facility] 12/19-12/23/21), numerous ED visits for both medical/psych complaints, longstanding h/o SIB (head banging, cutting), but no known SA, longstanding h/o endorsing SI and then retracting, h/o aggression toward staff at , [facility] significant for asthma, DM, urinary retention, GERD, BPH, hypothyroidism, HLD, HTN, and b/l myopia, on Depakote 500 mg BID, Guanfacine 2 mg, Risperdal 4 mg, and Remeron 15 mg, prescribed by his PCP, who presents to the ED for abdominal pain and while in the ED he also expressed SI/HI to staff.    Per chart review, pt has a longstanding h/o frequently presenting to the ED with medical/psychiatric complaints with the goal of being admitted to an inpatient unit either for secondary gain to avoid returning to his  where he reportedly does not get along with staff or for primary gain to be in the sick role given his previous d/o factitious disorder. His current presentation is consistent with his documented baseline as evidenced by the fact that he provides inconsistent history(initially reported HI and then denies it to this writer), endorses vague complaints of SI, and perseverates on being admitted to either med/psych inpatient in the setting of feeling frustrated with his living situation. This is further evidenced by the fact that, despite his reported SI,= the patient's behavior and affect are not consistent with someone who has genuine suicidal intent, given he presents as future-oriented and is motivated towards making his needs known and met. In addition, collateral from his aide indicates that the pt has not recently expressed plan or intent to kill himself, he has no known suicide attempts, and has been functioning at baseline.     In light of the above, this writer considers the pt to be at his baseline and he does not currently evidence signs or symptoms of an acute psychiatric condition that would potentially benefit with inpatient admission. As such, psychiatric hospitalization is not warranted at this time. To mitigate his risk of harm while in the ED, he was provided resources for outpt psych services and both he and his aide were recommended to return to the ED for acute safety concerns. No acute contraindications to discharge from a psychiatric standpoint.

## 2023-02-08 NOTE — ED BEHAVIORAL HEALTH ASSESSMENT NOTE - DIFFERENTIAL
moderate intellectual disability, ASD, unspecified personality disorders, impulse control disorders, conduct disorder, factitious disorder, HAVEN, mood disorders, PTSD and ADHD

## 2023-02-08 NOTE — ED PROVIDER NOTE - OBJECTIVE STATEMENT
31-year-old male with intellectual disability and factitious disorder presents with abdominal pain nausea vomiting diarrhea.  While being cared for by the nurse he states he is suicidal and homicidal ideations but has no plan.  He states his trigger was that his fiancée broke up with him 2 months ago.  He has been depressed constantly since that time.

## 2023-02-08 NOTE — ED BEHAVIORAL HEALTH ASSESSMENT NOTE - DETAILS
Pt refused to complete a safety plan when told he would not be admitted See hpi Pt activated EMS himself Per chart has prior trauma(details unknown) Per chart has had a rash in response to Zyprexa

## 2023-02-08 NOTE — ED ADULT NURSE NOTE - OBJECTIVE STATEMENT
As per pt, c/o generalized ABD pain w/ diarrhea. Pt reports, he has SI/HI episodes, no plan. BL hand bruises noted. Pt denies all other symptoms.

## 2023-02-20 NOTE — ED ADULT NURSE NOTE - NSFALLRSKUNASSIST_ED_ALL_ED
You may try Tylenol or Motrin for headaches.  Call your primary care doctor to arrange follow-up.  Go to ER if symptoms worsen.    Your blood pressure is elevated and should be rechecked by your primary care doctor in the next week.    Please call now to arrange your follow-up. Your diagnosis was made based on you presentation today and the information available.  Symptoms often change over time. If you are not improving as expected, please see your doctor or return here sooner that we discussed. If you develop symptoms that concern you, please return right away or go to the Emergency Room promptly.     no

## 2023-02-21 NOTE — ED ADULT NURSE NOTE - NSFALLRSKHARMRISK_ED_ALL_ED
Total Number Of Aks Treated: 5
Duration Of Freeze Thaw-Cycle (Seconds): 0
Detail Level: Zone
Post-Care Instructions: I reviewed with the patient in detail post-care instructions. Patient is to wear sunprotection, and avoid picking at any of the treated lesions. Pt may apply Vaseline to crusted or scabbing areas.
Consent: The patient's consent was obtained including but not limited to risks of crusting, scabbing, blistering, scarring, darker or lighter pigmentary change, recurrence, incomplete removal and infection.
Render In Bullet Format When Appropriate: No
no

## 2023-02-24 ENCOUNTER — OUTPATIENT (OUTPATIENT)
Dept: OUTPATIENT SERVICES | Facility: HOSPITAL | Age: 33
LOS: 1 days | Discharge: HOME | End: 2023-02-24

## 2023-02-24 DIAGNOSIS — C62.90 MALIGNANT NEOPLASM OF UNSPECIFIED TESTIS, UNSPECIFIED WHETHER DESCENDED OR UNDESCENDED: ICD-10-CM

## 2023-02-25 ENCOUNTER — EMERGENCY (EMERGENCY)
Facility: HOSPITAL | Age: 33
LOS: 1 days | Discharge: HOME | End: 2023-02-25
Attending: EMERGENCY MEDICINE
Payer: MEDICAID

## 2023-02-25 VITALS
HEIGHT: 71 IN | OXYGEN SATURATION: 96 % | DIASTOLIC BLOOD PRESSURE: 92 MMHG | RESPIRATION RATE: 17 BRPM | WEIGHT: 298.95 LBS | TEMPERATURE: 98 F | SYSTOLIC BLOOD PRESSURE: 146 MMHG | HEART RATE: 94 BPM

## 2023-02-25 VITALS
TEMPERATURE: 99 F | SYSTOLIC BLOOD PRESSURE: 113 MMHG | RESPIRATION RATE: 18 BRPM | HEART RATE: 77 BPM | OXYGEN SATURATION: 98 % | DIASTOLIC BLOOD PRESSURE: 83 MMHG

## 2023-02-25 LAB
ALBUMIN SERPL ELPH-MCNC: 3.2 G/DL — LOW (ref 3.5–5)
ALP SERPL-CCNC: 95 U/L — SIGNIFICANT CHANGE UP (ref 40–120)
ALT FLD-CCNC: 125 U/L DA — HIGH (ref 10–60)
ANION GAP SERPL CALC-SCNC: 6 MMOL/L — SIGNIFICANT CHANGE UP (ref 5–17)
APPEARANCE UR: CLEAR — SIGNIFICANT CHANGE UP
AST SERPL-CCNC: 59 U/L — HIGH (ref 10–40)
BASOPHILS # BLD AUTO: 0.03 K/UL — SIGNIFICANT CHANGE UP (ref 0–0.2)
BASOPHILS NFR BLD AUTO: 0.5 % — SIGNIFICANT CHANGE UP (ref 0–2)
BILIRUB SERPL-MCNC: 0.3 MG/DL — SIGNIFICANT CHANGE UP (ref 0.2–1.2)
BILIRUB UR-MCNC: NEGATIVE — SIGNIFICANT CHANGE UP
BUN SERPL-MCNC: 10 MG/DL — SIGNIFICANT CHANGE UP (ref 7–18)
CALCIUM SERPL-MCNC: 9.2 MG/DL — SIGNIFICANT CHANGE UP (ref 8.4–10.5)
CHLORIDE SERPL-SCNC: 104 MMOL/L — SIGNIFICANT CHANGE UP (ref 96–108)
CO2 SERPL-SCNC: 26 MMOL/L — SIGNIFICANT CHANGE UP (ref 22–31)
COLOR SPEC: YELLOW — SIGNIFICANT CHANGE UP
CREAT SERPL-MCNC: 0.85 MG/DL — SIGNIFICANT CHANGE UP (ref 0.5–1.3)
DIFF PNL FLD: NEGATIVE — SIGNIFICANT CHANGE UP
EGFR: 118 ML/MIN/1.73M2 — SIGNIFICANT CHANGE UP
EOSINOPHIL # BLD AUTO: 0.25 K/UL — SIGNIFICANT CHANGE UP (ref 0–0.5)
EOSINOPHIL NFR BLD AUTO: 3.9 % — SIGNIFICANT CHANGE UP (ref 0–6)
ETHANOL SERPL-MCNC: <3 MG/DL — SIGNIFICANT CHANGE UP (ref 0–10)
GLUCOSE SERPL-MCNC: 147 MG/DL — HIGH (ref 70–99)
GLUCOSE UR QL: NEGATIVE — SIGNIFICANT CHANGE UP
HCT VFR BLD CALC: 42.7 % — SIGNIFICANT CHANGE UP (ref 39–50)
HGB BLD-MCNC: 13.7 G/DL — SIGNIFICANT CHANGE UP (ref 13–17)
IMM GRANULOCYTES NFR BLD AUTO: 0.6 % — SIGNIFICANT CHANGE UP (ref 0–0.9)
KETONES UR-MCNC: ABNORMAL
LEUKOCYTE ESTERASE UR-ACNC: ABNORMAL
LYMPHOCYTES # BLD AUTO: 2.46 K/UL — SIGNIFICANT CHANGE UP (ref 1–3.3)
LYMPHOCYTES # BLD AUTO: 38.1 % — SIGNIFICANT CHANGE UP (ref 13–44)
MCHC RBC-ENTMCNC: 25.6 PG — LOW (ref 27–34)
MCHC RBC-ENTMCNC: 32.1 GM/DL — SIGNIFICANT CHANGE UP (ref 32–36)
MCV RBC AUTO: 79.8 FL — LOW (ref 80–100)
MONOCYTES # BLD AUTO: 0.53 K/UL — SIGNIFICANT CHANGE UP (ref 0–0.9)
MONOCYTES NFR BLD AUTO: 8.2 % — SIGNIFICANT CHANGE UP (ref 2–14)
NEUTROPHILS # BLD AUTO: 3.14 K/UL — SIGNIFICANT CHANGE UP (ref 1.8–7.4)
NEUTROPHILS NFR BLD AUTO: 48.7 % — SIGNIFICANT CHANGE UP (ref 43–77)
NITRITE UR-MCNC: NEGATIVE — SIGNIFICANT CHANGE UP
NRBC # BLD: 0 /100 WBCS — SIGNIFICANT CHANGE UP (ref 0–0)
PCP SPEC-MCNC: SIGNIFICANT CHANGE UP
PH UR: 5 — SIGNIFICANT CHANGE UP (ref 5–8)
PLATELET # BLD AUTO: 186 K/UL — SIGNIFICANT CHANGE UP (ref 150–400)
POTASSIUM SERPL-MCNC: 4.4 MMOL/L — SIGNIFICANT CHANGE UP (ref 3.5–5.3)
POTASSIUM SERPL-SCNC: 4.4 MMOL/L — SIGNIFICANT CHANGE UP (ref 3.5–5.3)
PROT SERPL-MCNC: 6.7 G/DL — SIGNIFICANT CHANGE UP (ref 6–8.3)
PROT UR-MCNC: 30 MG/DL
RBC # BLD: 5.35 M/UL — SIGNIFICANT CHANGE UP (ref 4.2–5.8)
RBC # FLD: 13.8 % — SIGNIFICANT CHANGE UP (ref 10.3–14.5)
SODIUM SERPL-SCNC: 136 MMOL/L — SIGNIFICANT CHANGE UP (ref 135–145)
SP GR SPEC: 1.02 — SIGNIFICANT CHANGE UP (ref 1.01–1.02)
UROBILINOGEN FLD QL: 1
WBC # BLD: 6.45 K/UL — SIGNIFICANT CHANGE UP (ref 3.8–10.5)
WBC # FLD AUTO: 6.45 K/UL — SIGNIFICANT CHANGE UP (ref 3.8–10.5)

## 2023-02-25 PROCEDURE — 80307 DRUG TEST PRSMV CHEM ANLYZR: CPT

## 2023-02-25 PROCEDURE — 80053 COMPREHEN METABOLIC PANEL: CPT

## 2023-02-25 PROCEDURE — 81001 URINALYSIS AUTO W/SCOPE: CPT

## 2023-02-25 PROCEDURE — 93005 ELECTROCARDIOGRAM TRACING: CPT

## 2023-02-25 PROCEDURE — 99291 CRITICAL CARE FIRST HOUR: CPT

## 2023-02-25 PROCEDURE — 73110 X-RAY EXAM OF WRIST: CPT

## 2023-02-25 PROCEDURE — 87086 URINE CULTURE/COLONY COUNT: CPT

## 2023-02-25 PROCEDURE — 93010 ELECTROCARDIOGRAM REPORT: CPT

## 2023-02-25 PROCEDURE — 36415 COLL VENOUS BLD VENIPUNCTURE: CPT

## 2023-02-25 PROCEDURE — 73110 X-RAY EXAM OF WRIST: CPT | Mod: 26,LT

## 2023-02-25 PROCEDURE — 85025 COMPLETE CBC W/AUTO DIFF WBC: CPT

## 2023-02-25 NOTE — ED BEHAVIORAL HEALTH ASSESSMENT NOTE - HPI (INCLUDE ILLNESS QUALITY, SEVERITY, DURATION, TIMING, CONTEXT, MODIFYING FACTORS, ASSOCIATED SIGNS AND SYMPTOMS)
Pt is a 31 yo M, single, disabled, non-caregiver, resides at a [facility], with psych h/o moderate ID, ASD, impulse control disorder, factitious disorder, HAVEN, mood disorders, PTSD and ADHD, multiple past psych admissions (last known [facility] 12/19-12/23/21), numerous ED visits for both medical/psych complaints, longstanding h/o SIB (head banging, cutting), but no known SA, longstanding h/o endorsing SI and then retracting, h/o aggression toward staff at , [facility] significant for asthma, DM, urinary retention, GERD, BPH, hypothyroidism, HLD, HTN, and b/l myopia, on Depakote 500 mg BID, Guanfacine 2 mg, Risperdal 4 mg, and Remeron 15 mg, prescribed by his PCP, who presents to the ED for suicidal and homicidal thoughts to kill self and kill  staff.      On assessment, pt presents with concrete thought process, perseverative on goal of inpatient psychiatric admission, reporting that "he's happier there."  He states that he came to the ED because he doesn't like his group home and wants to be admitted to psych for this reason.  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  He endorses that he would kick staff members or would stab himself in the hand.  Patient denies access to sharp objects, lethal means, weapons, or other means of harming self or others.  Patient is unable to identify any specific trigger for why these thoughts have surfaced today and denies recent stressors, issues making him more depressed, angry, or unable to remain calm and the .      Patient ise calm, euthymic, and future-oriented with no internal preoccupation or agitation.  Though he endorses depressed mood, his affect is not congruent.  He denies issues with sleep, appetite, anhedonia, hopelessness, worthlessness.  Denies manic symptoms.   Denies auditory or visual hallucinations, denies paranoid ideations.  No delusion elicited on exam. Pt gave consent to telepsych team to speak with the aide who accompanied him.    Denies recent substance use.     Patient reports that he has a psychiatrist who comes to the group home, cannot recall their name, but plans ot followup with them soon, next appt is in March.       This writer spoke with the pt's aide, Emily([phone]), and per collateral:   The pt has a hx of frequently calling EMS to be taken to the hospital for various med/psych complaints and he has been recently functioning at baseline; she adds that at his baseline he will frequently call 911 or request to go to the ED when he is frustrated or "doesn't get his way" at the .  He has a longstanding h/o expressing SI and then retracting, he has not made any known suicide attempts, and he has not exhibited signs or symptoms of audiovisual hallucinations, paranoid ideations, or other delusions.  He has been compliant with his psychotropic medication and with doctors appointments.  Collateral denies the patient having any access to lethal means, knives, pills, etc., and there is 24/7 supervision at the  such that the patient is not an acute danger to self or others and they feel safe taking him home tonight.

## 2023-02-25 NOTE — ED ADULT NURSE NOTE - OBJECTIVE STATEMENT
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 constant observation initiated.

## 2023-02-25 NOTE — ED BEHAVIORAL HEALTH ASSESSMENT NOTE - DETAILS
spoke with group home staff endorses plan to stab his hand but has no access to knives.  denies other plans for suicide. see doc Per chart has prior trauma(details unknown) Per chart has had a rash in response to Zyprexa

## 2023-02-25 NOTE — ED PROVIDER NOTE - PATIENT PORTAL LINK FT
You can access the FollowMyHealth Patient Portal offered by [facility] by registering at the following website: [email or web address]. By joining [employer]’s FollowMyHealth portal, you will also be able to view your health information using other applications (apps) compatible with our system.

## 2023-02-25 NOTE — ED ADULT TRIAGE NOTE - CHIEF COMPLAINT QUOTE
left arm pain; Told EMS "his aid is getting him mad to point where he wants to murder her, punch her in th face"

## 2023-02-25 NOTE — ED BEHAVIORAL HEALTH ASSESSMENT NOTE - SUMMARY
Pt is a 31 yo M, single, disabled, non-caregiver, resides at a [facility], with psych h/o moderate ID, ASD, impulse control disorder, factitious disorder, HAVEN, mood disorders, PTSD and ADHD, multiple past psych admissions (last known [facility] 12/19-12/23/21), numerous ED visits for both medical/psych complaints, longstanding h/o SIB (head banging, cutting), but no known SA, longstanding h/o endorsing SI and then retracting, h/o aggression toward staff at , PMH significant for asthma, DM, urinary retention, GERD, BPH, hypothyroidism, HLD, HTN, and b/l myopia, on Depakote 500 mg BID, Guanfacine 2 mg, Risperdal 4 mg, and Remeron 15 mg, prescribed by his PCP, who presents to the ED for suicidal and homicidal thoughts to kill self and kill  staff.      Per chart review and collateral, pt has a longstanding h/o frequently presenting to the ED with medical/psychiatric complaints with the goal of being admitted to an inpatient unit either for secondary gain to avoid returning to his  where he reportedly does not get along with staff or for primary gain to be in the sick role given his previous d/o factitious disorder. His current presentation is consistent with his documented baseline as evidenced by the patinet requesting psychiatric admission for complaints that  staff report are unfounded (they deny concerns for recent suicidality or homicidality).  This is further evidenced by the fact that, despite his reported SI, the patient's behavior and affect are not consistent with someone who has genuine suicidal or homicidal intent, given he presents as future-oriented and is motivated towards making his needs known and met. In addition, collateral from his aide indicates that the patient has no access to lethal means or dangerous objects with which he could harm himself or others, and furthermore he has 24/7 supervision and staff denies any concern that he could actually become a danger to self or others upon returning home.  Additionally, the patient has strong care seeking behaviors and would be very likely to return to the ED or call 911 if he had genuine thoughts and plans to harm himself or others.    In light of the above, this writer considers the pt to be at his baseline and he does not currently evidence signs or symptoms of an acute psychiatric condition that would potentially benefit with inpatient admission. As such, psychiatric hospitalization is not warranted at this time. No acute contraindications to discharge from a psychiatric standpoint.

## 2023-02-25 NOTE — ED PROVIDER NOTE - PROGRESS NOTE DETAILS
ATTG: : Patient endorsed to me by Dr. Winter, awaiting lab results and psychiatric evaluation. ATTG: : Patient endorsed to me by Dr. Winter, awaiting lab results and psychiatric evaluation. [facility] with patient at bedside contact [phone]  Psychiatry consulted at 8:45 pm. awaiting full recommendations ATTG: : patient cooperative with me. no longer threatening his attendant. wants to go home. evaluated by psych, agree with plan for dc home to follow with pmd as ouitpt.

## 2023-02-25 NOTE — ED PROVIDER NOTE - CLINICAL SUMMARY MEDICAL DECISION MAKING FREE TEXT BOX
Patient with left wrist pain we will do x-ray.  Patient also complaining of homicidal ideation towards the staff at his facility and states he is not happy there.  Will place on one-to-one observation and consult telepsych.  Patient with similar presentation multiple times

## 2023-02-25 NOTE — ED PROVIDER NOTE - PHYSICAL EXAMINATION
Heart regular lungs clear awake alert not in any distress left wrist tenderness to palpation no obvious swelling or deformity 2+ radial pulse

## 2023-02-25 NOTE — ED PROVIDER NOTE - NSFOLLOWUPINSTRUCTIONS_ED_ALL_ED_FT
Your diagnosis this visit was: Wrist pain    From this ED visit you were prescribed: no new medications    You may be contacted by our Emergency Department Referrals Coordinator to set up your follow-up appointment within 24-48 hours of your discharge, Monday through Friday.   We recommend you follow up with: your medical doctor    Please return to the Emergency Department if you experience any of the following symptoms:  - Shortness of breath or trouble breathing  - Pressure, pain, or tightness in the chest  - Face drooping, arm weakness or speech difficulty,  - Persistent or severe vomiting  - Head injury or loss of consciousness  - Nonstop bleeding or an open wound

## 2023-02-25 NOTE — ED PROVIDER NOTE - OBJECTIVE STATEMENT
32-year-old male complaining of left wrist pain.  Patient punched a wall several weeks ago and hurt his left wrist he had a cast that was removed yesterday but he was told that he had no fracture and.  However now the pain is worse.  Furthermore patient is having thoughts about hurting the staff at his facility and he is not happy there.  Denies any other complaints

## 2023-02-26 LAB
CULTURE RESULTS: SIGNIFICANT CHANGE UP
SPECIMEN SOURCE: SIGNIFICANT CHANGE UP

## 2023-02-26 NOTE — CHART NOTE - NSCHARTNOTEFT_GEN_A_CORE
Pt is a 32 year old male who was sent to the ED from [facility] with complaint of homicidal thoughts. Pt frequents the ED and he is well known to this  martha /staff. Martha met with Pt at bedside, he appeared alert and oriented. Pt reports that a cast was removed from his wrist yesterday . The wrist still hurts and when he complained to Emily his aide (ph. [phone]), she dismissed his complaint. He stated he got angry and felt like killing her. Martha informed Pt him that he would have to be assessed by Tele psych. Pt reported feeling better and would not want to be seen by tele psych. Martha explained to him that Tele psych would have to evaluate him since he voiced wanting to kill his aide. He reports he does not want to return to the group home. Emotional support provided. Martha explored other possible housing options with him and, could not come up with any other appropriative housing for him at this time. Therefore, he was encouraged to return to his home and continue to comply with treatment plan goals. Pt voiced understanding.      Martha met with Emily, Pt's aide separately. Emily reports Pt's cast being removed from Pt's wrist yesterday and today Pt reported wrist pain and she offered him medication. Pt declined and requested to come to the ED. Emily shared that  Pt never expressed  any suicidal or homicidal ideation/ plan/ intent towards her and Pt always request to come to the ED whenever he does not get what he wants. Pt was seen by Tele psych, he was treated and released. Martha met with Pt re d/c and  Shahriar (ph. ) Pt's aide who took over from Emily was at bedside. Martha informed them that Pt was cleared to return home. Shahriar will be accompanying Pt home.      Pt is socially cleared and Physician updated. Pt is a 32 year old male who was sent to the ED from [facility] with complaint of homicidal thoughts. Pt frequents the ED and he is well known to this  martha /staff. Martha met with Pt at bedside, he appeared alert and oriented. Pt reports that a cast was removed from his wrist yesterday . The wrist still hurts and when he complained to Emily his aide (ph. [phone]), she dismissed his complaint. He stated he got angry and felt like killing her. Martha informed Pt him that he would have to be assessed by Tele psych. Pt reported feeling better and would not want to be seen by tele psych. Martha explained to him that Tele psych would have to evaluate him since he voiced the thought of killing his aide. He further reports that he does not want to return to the group home. Emotional support provided. Martha explored other possible housing options with him and could not come up with any other appropriative housing at this time. Therefore, he was encouraged to return to his home and continue to comply with his treatment plan goals. Pt voiced understanding.      Martha met with Emily, Pt's aide separately. Emily reports a cast was removed from Pt's wrist yesterday and today Pt reported wrist pain. She offered him medication but he declined and requested to come to the ED. Emily shared that  Pt never expressed  any suicidal or homicidal ideation/ plan/ intent towards her and Pt always request to come to the ED whenever he does not get what he wants.     Pt was seen by Tele psych, he was treated and released. Martha met with Pt re d/c and Shahriar (ph. ) Pt's aide who took over from Emily was at bedside. Martha informed them that Pt was cleared to return home. Shahriar will be accompanying Pt home.      Pt is socially cleared and Physician updated. Pt is a 32 year old male who was sent to the ED from [facility] with complaint of homicidal thoughts. Pt frequents the ED and he is well known to this  martha /staff. Martha met with Pt at bedside, he appeared alert and oriented. Pt reports that a cast was removed from his wrist yesterday . The wrist still hurts and when he complained to Emily his aide (ph. [phone]), she dismissed his complaint. He stated he got angry and felt like killing her. Martha informed Pt him that he would have to be assessed by Tele psych. Pt reported feeling better and would not want to be seen by tele psych. Martha explained to him that Tele psych would have to evaluate him since he voiced the thought of killing his aide. He further reports that he does not want to return to the group home. Emotional support provided. Martha explored other possible housing options with him and could not come up with any other appropriative housing at this time. Therefore, he was encouraged to return to his home and continue to comply with his treatment plan goals. Pt voiced understanding.      Martha met with Emily, Pt's aide separately. Emily reports a cast was removed from Pt's wrist yesterday and today Pt reported wrist pain. She offered him medication but he declined and requested to come to the ED. Emily shared that  Pt never expressed  any suicidal or homicidal ideation/ plan/ intent towards her and Pt always request to come to the ED whenever he does not get what he wants.     Pt was seen by Tele psych, he was treated and released. Martha met with Pt re d/c and Shahriar (ph. [phone]) Pt's aide who took over from Emily was at bedside. Martha informed them that Pt was cleared to return home. Shahriar will be accompanying Pt home. Pt was encouraged to continue to follow up with the psychiatrist at the [facility] [place] and he could call the Suicide Prevention Lifeline Phone: [phone] ([phone]).     Pt is socially cleared and Physician updated.

## 2023-02-26 NOTE — ED BEHAVIORAL HEALTH NOTE - BEHAVIORAL HEALTH NOTE
[facility] outreached Emily ([phone]), staff at patient's group home who was with patient throughout the day leading up the ED visit. Emily reported that patient reported wrist pain. When Emily offered him Ibuprofen, patient denied and reported he wanted to go to the hospital. Emily denied patient to expressed suicidal or homicidal ideation/ plan/ intent. Emily denied patient to present with aggression today and denied patient to have history of aggression towards others. Emily reported that patient has history of frequent ED visits, often when "he doesn't get what he wants.

## 2023-03-02 NOTE — ED ADULT NURSE NOTE - TEMPLATE
Colonoscopy screening--No personal history of polyps--No family history of polyps--Family history colon cancer ( GRANDFATHER) --No blood thinners--Medications:              estradiol (MINIVELLE, VIVELLE-DOT) 0.05 MG/24HR patch    levonorgestrel (Mirena, 52 MG,) 20 MCG/24HR IUD                  QUESTIONNAIRE SCREENING  SCAN IN  MEDIA & HAS BEEN SENT TO DOCTOR FOR REVIEW           
General

## 2023-03-03 ENCOUNTER — APPOINTMENT (OUTPATIENT)
Dept: HEMATOLOGY ONCOLOGY | Facility: CLINIC | Age: 33
End: 2023-03-03
Payer: MEDICAID

## 2023-03-03 ENCOUNTER — RESULT REVIEW (OUTPATIENT)
Age: 33
End: 2023-03-03

## 2023-03-03 VITALS
OXYGEN SATURATION: 78 % | SYSTOLIC BLOOD PRESSURE: 115 MMHG | TEMPERATURE: 97.9 F | BODY MASS INDEX: 42.83 KG/M2 | HEART RATE: 99 BPM | RESPIRATION RATE: 16 BRPM | WEIGHT: 298.51 LBS | DIASTOLIC BLOOD PRESSURE: 81 MMHG

## 2023-03-03 LAB
BASOPHILS # BLD AUTO: 0.02 K/UL — SIGNIFICANT CHANGE UP (ref 0–0.2)
BASOPHILS NFR BLD AUTO: 0.3 % — SIGNIFICANT CHANGE UP (ref 0–2)
EOSINOPHIL # BLD AUTO: 0.21 K/UL — SIGNIFICANT CHANGE UP (ref 0–0.5)
EOSINOPHIL NFR BLD AUTO: 3 % — SIGNIFICANT CHANGE UP (ref 0–6)
HCT VFR BLD CALC: 44.8 % — SIGNIFICANT CHANGE UP (ref 39–50)
HGB BLD-MCNC: 14.4 G/DL — SIGNIFICANT CHANGE UP (ref 13–17)
IMM GRANULOCYTES NFR BLD AUTO: 1.1 % — HIGH (ref 0–0.9)
LYMPHOCYTES # BLD AUTO: 2.32 K/UL — SIGNIFICANT CHANGE UP (ref 1–3.3)
LYMPHOCYTES # BLD AUTO: 32.7 % — SIGNIFICANT CHANGE UP (ref 13–44)
MCHC RBC-ENTMCNC: 25.5 PG — LOW (ref 27–34)
MCHC RBC-ENTMCNC: 32.1 G/DL — SIGNIFICANT CHANGE UP (ref 32–36)
MCV RBC AUTO: 79.4 FL — LOW (ref 80–100)
MONOCYTES # BLD AUTO: 0.61 K/UL — SIGNIFICANT CHANGE UP (ref 0–0.9)
MONOCYTES NFR BLD AUTO: 8.6 % — SIGNIFICANT CHANGE UP (ref 2–14)
NEUTROPHILS # BLD AUTO: 3.86 K/UL — SIGNIFICANT CHANGE UP (ref 1.8–7.4)
NEUTROPHILS NFR BLD AUTO: 54.3 % — SIGNIFICANT CHANGE UP (ref 43–77)
NRBC # BLD: 0 /100 WBCS — SIGNIFICANT CHANGE UP (ref 0–0)
PLATELET # BLD AUTO: 177 K/UL — SIGNIFICANT CHANGE UP (ref 150–400)
RBC # BLD: 5.64 M/UL — SIGNIFICANT CHANGE UP (ref 4.2–5.8)
RBC # FLD: 13.7 % — SIGNIFICANT CHANGE UP (ref 10.3–14.5)
WBC # BLD: 7.1 K/UL — SIGNIFICANT CHANGE UP (ref 3.8–10.5)
WBC # FLD AUTO: 7.1 K/UL — SIGNIFICANT CHANGE UP (ref 3.8–10.5)

## 2023-03-03 PROCEDURE — 99213 OFFICE O/P EST LOW 20 MIN: CPT

## 2023-03-03 NOTE — HISTORY OF PRESENT ILLNESS
[Disease: _____________________] : Disease: [unfilled] [T: ___] : T[unfilled] [N: ___] : N[unfilled] [M: ___] : M[unfilled] [AJCC Stage: ____] : AJCC Stage: [unfilled] [Date: ____________] : Patient's last distress assessment performed on [unfilled]. [3 - Distress Level] : Distress Level: 3 [de-identified] : Markus Bazan 32 years old male with history of autism who initially presented left testicle pain and swelling on April 2022, US testicle showed a 2.7cm mass in the left testicle\par again on 9/26 on ED and admitted for left testicle pain and swelling \par \par US showed 4.0cm lesion\par CT abdomen and pelvis post orchiectomy showed postsurgical changes\par \par 9/25  hCG<1, AFP 3.3 and \par \par 9/27 left radical  orchiectomy, pathology showed seminoma, tumor invades rete testis, margins negative, LVI negative, pT1b tumor limited to the testis, pT1b\par \par  [de-identified] : seminoma  [de-identified] : 11/4/22 report burning while urinating, frequency \par \par 12/16/22 report burning while urinating and frequency. Denies abdominal pain. States sometimes difficulty urination, however, bedwetting occurs every other night including last night. \par \par 1/11/23 went to ED last night [facility] because of chest tightness and wheezing with history of asthma. Reports burning urination, pending UA at PCP, no fever. . \par \par 1/30/23 CT abdomen and pelvis showed No evidence of lymphadenopathy. Moderate hepatic steatosis. Trace residual bilateral layering pleural effusions.\par \par 3/3/23 reports some lower abdominal skin pain by touching. however, no rash and blister.

## 2023-03-03 NOTE — ASSESSMENT
[FreeTextEntry1] : Markus Romero Jr is a 32 years old male with history of autism who has newly diagnosed stage Ia seminoma \par \par  He has stage Ia seminoma. Although his only LDH was 308, hCG and AFP were normal. LDH is nonspecific. NCCN also mentioned decisions regarding treatment should not be made on mildly elevated less than 3 upper limited of normal LDH alone. His LDH will be followed. He has stage Ia seminoma. The recurrence in 5 years is about 10% to 15%. If he is treated with radiation vs one or two doses of carboplatin auc 7, overtreatment is 85% to 90%.  He will be under surveillance given salvage treatment about 100%. He will have follow up in one month for LDH level, then every 3 months in the first year, every 6 months in the year 2 and 3, annually in the year 4 and 5. Surveillance images every 6 months in the first two years, annually in the year 3, 4 and 5. His LDH on Oct 27 was 325, however, hCG and AFP wnl. Andrews 3, . Jan 30, 2023 CT abd and pelvis showed showed KAIN. \par \par Plan\par check labs including LDH, hCG and AFP today\par will follow up with his labs including LDH\par will continue to hold off lipitor given his mildly elevated LFT\par RTC 3 months\par \par Mom contact [phone]\par \par

## 2023-03-03 NOTE — REVIEW OF SYSTEMS
[Wheezing] : wheezing [Joint Pain] : joint pain [Skin Wound] : skin wound [Negative] : Allergic/Immunologic [Shortness Of Breath] : no shortness of breath [SOB on Exertion] : no shortness of breath during exertion [Abdominal Pain] : no abdominal pain [Vomiting] : no vomiting [Constipation] : no constipation [Diarrhea] : no diarrhea [Joint Stiffness] : no joint stiffness [Muscle Pain] : no muscle pain [Muscle Weakness] : no muscle weakness [de-identified] : left hand wound

## 2023-03-04 ENCOUNTER — EMERGENCY (EMERGENCY)
Facility: HOSPITAL | Age: 33
LOS: 1 days | Discharge: HOME | End: 2023-03-04
Attending: EMERGENCY MEDICINE
Payer: MEDICAID

## 2023-03-04 VITALS
HEART RATE: 71 BPM | RESPIRATION RATE: 18 BRPM | DIASTOLIC BLOOD PRESSURE: 81 MMHG | SYSTOLIC BLOOD PRESSURE: 139 MMHG | OXYGEN SATURATION: 95 % | TEMPERATURE: 98 F

## 2023-03-04 VITALS
TEMPERATURE: 98 F | SYSTOLIC BLOOD PRESSURE: 138 MMHG | RESPIRATION RATE: 18 BRPM | WEIGHT: 303.58 LBS | HEART RATE: 107 BPM | HEIGHT: 71 IN | DIASTOLIC BLOOD PRESSURE: 95 MMHG | OXYGEN SATURATION: 95 %

## 2023-03-04 LAB
ALBUMIN SERPL ELPH-MCNC: 3.2 G/DL — LOW (ref 3.5–5)
ALP SERPL-CCNC: 103 U/L — SIGNIFICANT CHANGE UP (ref 40–120)
ALT FLD-CCNC: 119 U/L DA — HIGH (ref 10–60)
ANION GAP SERPL CALC-SCNC: 10 MMOL/L — SIGNIFICANT CHANGE UP (ref 5–17)
APPEARANCE UR: CLEAR — SIGNIFICANT CHANGE UP
AST SERPL-CCNC: 48 U/L — HIGH (ref 10–40)
BASOPHILS # BLD AUTO: 0.03 K/UL — SIGNIFICANT CHANGE UP (ref 0–0.2)
BASOPHILS NFR BLD AUTO: 0.5 % — SIGNIFICANT CHANGE UP (ref 0–2)
BILIRUB SERPL-MCNC: 0.2 MG/DL — SIGNIFICANT CHANGE UP (ref 0.2–1.2)
BILIRUB UR-MCNC: NEGATIVE — SIGNIFICANT CHANGE UP
BUN SERPL-MCNC: 12 MG/DL — SIGNIFICANT CHANGE UP (ref 7–18)
CALCIUM SERPL-MCNC: 9.2 MG/DL — SIGNIFICANT CHANGE UP (ref 8.4–10.5)
CHLORIDE SERPL-SCNC: 104 MMOL/L — SIGNIFICANT CHANGE UP (ref 96–108)
CO2 SERPL-SCNC: 25 MMOL/L — SIGNIFICANT CHANGE UP (ref 22–31)
COLOR SPEC: YELLOW — SIGNIFICANT CHANGE UP
CREAT SERPL-MCNC: 0.96 MG/DL — SIGNIFICANT CHANGE UP (ref 0.5–1.3)
DIFF PNL FLD: NEGATIVE — SIGNIFICANT CHANGE UP
EGFR: 107 ML/MIN/1.73M2 — SIGNIFICANT CHANGE UP
EOSINOPHIL # BLD AUTO: 0.23 K/UL — SIGNIFICANT CHANGE UP (ref 0–0.5)
EOSINOPHIL NFR BLD AUTO: 3.8 % — SIGNIFICANT CHANGE UP (ref 0–6)
GLUCOSE SERPL-MCNC: 180 MG/DL — HIGH (ref 70–99)
GLUCOSE UR QL: NEGATIVE — SIGNIFICANT CHANGE UP
HCT VFR BLD CALC: 42.9 % — SIGNIFICANT CHANGE UP (ref 39–50)
HGB BLD-MCNC: 13.7 G/DL — SIGNIFICANT CHANGE UP (ref 13–17)
IMM GRANULOCYTES NFR BLD AUTO: 0.8 % — SIGNIFICANT CHANGE UP (ref 0–0.9)
KETONES UR-MCNC: ABNORMAL
LEUKOCYTE ESTERASE UR-ACNC: NEGATIVE — SIGNIFICANT CHANGE UP
LYMPHOCYTES # BLD AUTO: 2.34 K/UL — SIGNIFICANT CHANGE UP (ref 1–3.3)
LYMPHOCYTES # BLD AUTO: 38.4 % — SIGNIFICANT CHANGE UP (ref 13–44)
MCHC RBC-ENTMCNC: 25.8 PG — LOW (ref 27–34)
MCHC RBC-ENTMCNC: 31.9 GM/DL — LOW (ref 32–36)
MCV RBC AUTO: 80.9 FL — SIGNIFICANT CHANGE UP (ref 80–100)
MONOCYTES # BLD AUTO: 0.52 K/UL — SIGNIFICANT CHANGE UP (ref 0–0.9)
MONOCYTES NFR BLD AUTO: 8.5 % — SIGNIFICANT CHANGE UP (ref 2–14)
NEUTROPHILS # BLD AUTO: 2.92 K/UL — SIGNIFICANT CHANGE UP (ref 1.8–7.4)
NEUTROPHILS NFR BLD AUTO: 48 % — SIGNIFICANT CHANGE UP (ref 43–77)
NITRITE UR-MCNC: NEGATIVE — SIGNIFICANT CHANGE UP
NRBC # BLD: 0 /100 WBCS — SIGNIFICANT CHANGE UP (ref 0–0)
PH UR: 5 — SIGNIFICANT CHANGE UP (ref 5–8)
PLATELET # BLD AUTO: 175 K/UL — SIGNIFICANT CHANGE UP (ref 150–400)
POTASSIUM SERPL-MCNC: 4.1 MMOL/L — SIGNIFICANT CHANGE UP (ref 3.5–5.3)
POTASSIUM SERPL-SCNC: 4.1 MMOL/L — SIGNIFICANT CHANGE UP (ref 3.5–5.3)
PROT SERPL-MCNC: 6.5 G/DL — SIGNIFICANT CHANGE UP (ref 6–8.3)
PROT UR-MCNC: 15
RBC # BLD: 5.3 M/UL — SIGNIFICANT CHANGE UP (ref 4.2–5.8)
RBC # FLD: 13.8 % — SIGNIFICANT CHANGE UP (ref 10.3–14.5)
SODIUM SERPL-SCNC: 139 MMOL/L — SIGNIFICANT CHANGE UP (ref 135–145)
SP GR SPEC: 1.03 — HIGH (ref 1.01–1.02)
UROBILINOGEN FLD QL: 1
WBC # BLD: 6.09 K/UL — SIGNIFICANT CHANGE UP (ref 3.8–10.5)
WBC # FLD AUTO: 6.09 K/UL — SIGNIFICANT CHANGE UP (ref 3.8–10.5)

## 2023-03-04 PROCEDURE — 74177 CT ABD & PELVIS W/CONTRAST: CPT | Mod: 26,MA

## 2023-03-04 PROCEDURE — 99284 EMERGENCY DEPT VISIT MOD MDM: CPT

## 2023-03-04 PROCEDURE — 85025 COMPLETE CBC W/AUTO DIFF WBC: CPT

## 2023-03-04 PROCEDURE — 96375 TX/PRO/DX INJ NEW DRUG ADDON: CPT

## 2023-03-04 PROCEDURE — 36415 COLL VENOUS BLD VENIPUNCTURE: CPT

## 2023-03-04 PROCEDURE — 99284 EMERGENCY DEPT VISIT MOD MDM: CPT | Mod: 25

## 2023-03-04 PROCEDURE — 74177 CT ABD & PELVIS W/CONTRAST: CPT | Mod: MA

## 2023-03-04 PROCEDURE — 96374 THER/PROPH/DIAG INJ IV PUSH: CPT | Mod: XU

## 2023-03-04 PROCEDURE — 81001 URINALYSIS AUTO W/SCOPE: CPT

## 2023-03-04 PROCEDURE — 87086 URINE CULTURE/COLONY COUNT: CPT

## 2023-03-04 PROCEDURE — 80053 COMPREHEN METABOLIC PANEL: CPT

## 2023-03-04 RX ORDER — CEFTRIAXONE 500 MG/1
1000 INJECTION, POWDER, FOR SOLUTION INTRAMUSCULAR; INTRAVENOUS ONCE
Refills: 0 | Status: COMPLETED | OUTPATIENT
Start: 2023-03-04 | End: 2023-03-04

## 2023-03-04 RX ORDER — IBUPROFEN 200 MG
1 TABLET ORAL
Qty: 28 | Refills: 0
Start: 2023-03-04 | End: 2023-03-10

## 2023-03-04 RX ORDER — KETOROLAC TROMETHAMINE 30 MG/ML
30 SYRINGE (ML) INJECTION ONCE
Refills: 0 | Status: DISCONTINUED | OUTPATIENT
Start: 2023-03-04 | End: 2023-03-04

## 2023-03-04 RX ORDER — CEPHALEXIN 500 MG
1 CAPSULE ORAL
Qty: 40 | Refills: 0
Start: 2023-03-04 | End: 2023-03-13

## 2023-03-04 RX ADMIN — Medication 30 MILLIGRAM(S): at 03:17

## 2023-03-04 RX ADMIN — CEFTRIAXONE 100 MILLIGRAM(S): 500 INJECTION, POWDER, FOR SOLUTION INTRAMUSCULAR; INTRAVENOUS at 05:11

## 2023-03-04 NOTE — ED PROVIDER NOTE - PATIENT PORTAL LINK FT
You can access the FollowMyHealth Patient Portal offered by [facility] by registering at the following website: [email or web address]. By joining [employer]’s FollowMyHealth portal, you will also be able to view your health information using other applications (apps) compatible with our system.

## 2023-03-04 NOTE — ED PROVIDER NOTE - IV ALTEPLASE DOOR HIDDEN
Patient: Kristie Cornejo    Procedure(s):  Biopsy left pubic ramis    Diagnosis: Pelvic mass [R19.00]  Diagnosis Additional Information: No value filed.    Anesthesia Type:   General     Note:    Oropharynx: oropharynx clear of all foreign objects  Level of Consciousness: awake  Oxygen Supplementation: face mask  Level of Supplemental Oxygen (L/min / FiO2): 6  Independent Airway: airway patency satisfactory and stable  Dentition: dentition unchanged  Vital Signs Stable: post-procedure vital signs reviewed and stable  Report to RN Given: handoff report given  Patient transferred to: PACU    Handoff Report: Identifed the Patient, Identified the Reponsible Provider, Reviewed the pertinent medical history, Discussed the surgical course, Reviewed Intra-OP anesthesia mangement and issues during anesthesia, Set expectations for post-procedure period and Allowed opportunity for questions and acknowledgement of understanding      Vitals:  Vitals Value Taken Time   /98 09/07/21 1055   Temp 36.8    Pulse 68 09/07/21 1059   Resp 23 09/07/21 1100   SpO2 97 % 09/07/21 1100   Vitals shown include unvalidated device data.    Electronically Signed By: DORON Lee CRNA  September 7, 2021  11:01 AM  
show

## 2023-03-04 NOTE — ED ADULT NURSE NOTE - NS TRANSFER PATIENT BELONGINGS

## 2023-03-04 NOTE — ED PROVIDER NOTE - PROGRESS NOTE DETAILS
pain improved. labs and ua are unremarkable. ct with subq inflammation vs mild cellulitis. given abx. rx for abx and motrin. f/u with PMD. return precautions discussed.

## 2023-03-04 NOTE — ED PROVIDER NOTE - OBJECTIVE STATEMENT
32 year old 32 year old male PMH autism, HLD, hypothyroid coming in with dysuria and pain to mons pubis area. pt had an orchiectomy for testicular cancer and states the area where they did surgery has been causing him pain for 2 weeks. states he went to the urologist and was told everything was ok. denies all other complaints.

## 2023-03-04 NOTE — ED PROVIDER NOTE - CLINICAL SUMMARY MEDICAL DECISION MAKING FREE TEXT BOX
32 year old male with dysuria and pain to mons pubis. PE as above.  labs, ua, ct abdomen, pain control, reassess

## 2023-03-04 NOTE — ED PROVIDER NOTE - NSFOLLOWUPINSTRUCTIONS_ED_ALL_ED_FT
[facility]                                                                                                                                                                                                              Cellulitis, Adult    A person's legs and feet. One leg is normal and the other leg is affected by cellulitis.   Cellulitis is a skin infection. The infected area is usually warm, red, swollen, and tender. This condition occurs most often in the arms and lower legs. The infection can travel to the muscles, blood, and underlying tissue and become serious. It is very important to get treated for this condition.      What are the causes?    Cellulitis is caused by bacteria. The bacteria enter through a break in the skin, such as a cut, burn, insect bite, open sore, or crack.      What increases the risk?    This condition is more likely to occur in people who:  •Have a weak body defense system (immune system).      •Have open wounds on the skin, such as cuts, burns, bites, and scrapes. Bacteria can enter the body through these open wounds.      •Are older than 60 years of age.      •Have diabetes.      •Have a type of long-lasting (chronic) liver disease (cirrhosis) or kidney disease.      •Are obese.    •Have a skin condition such as:  •Itchy rash (eczema).      •Slow movement of blood in the veins (venous stasis).      •Fluid buildup below the skin (edema).        •Have had radiation therapy.      •Use IV drugs.        What are the signs or symptoms?    Symptoms of this condition include:  •Redness, streaking, or spotting on the skin.      •Swollen area of the skin.      •Tenderness or pain when an area of the skin is touched.      •Warm skin.      •A fever.      •Chills.      •Blisters.        How is this diagnosed?    This condition is diagnosed based on a medical history and physical exam. You may also have tests, including:  •Blood tests.      •Imaging tests.        How is this treated?    Treatment for this condition may include:  •Medicines, such as antibiotic medicines or medicines to treat allergies (antihistamines).      •Supportive care, such as rest and application of cold or warm cloths (compresses) to the skin.      •Hospital care, if the condition is severe.      The infection usually starts to get better within 1–2 days of treatment.      Follow these instructions at home:  A comparison of three sample cups showing dark yellow, yellow, and pale yellow urine.   Medicines     •Take over-the-counter and prescription medicines only as told by your health care provider.      •If you were prescribed an antibiotic medicine, take it as told by your health care provider. Do not stop taking the antibiotic even if you start to feel better.      General instructions     •Drink enough fluid to keep your urine pale yellow.      • Do not touch or rub the infected area.      •Raise (elevate) the infected area above the level of your heart while you are sitting or lying down.      •Apply warm or cold compresses to the affected area as told by your health care provider.      •Keep all follow-up visits as told by your health care provider. This is important. These visits let your health care provider make sure a more serious infection is not developing.        Contact a health care provider if:    •You have a fever.      •Your symptoms do not begin to improve within 1–2 days of starting treatment.      •Your bone or joint underneath the infected area becomes painful after the skin has healed.      •Your infection returns in the same area or another area.      •You notice a swollen bump in the infected area.      •You develop new symptoms.      •You have a general ill feeling (malaise) with muscle aches and pains.        Get help right away if:    •Your symptoms get worse.      •You feel very sleepy.      •You develop vomiting or diarrhea that persists.      •You notice red streaks coming from the infected area.      •Your red area gets larger or turns dark in color.      These symptoms may represent a serious problem that is an emergency. Do not wait to see if the symptoms will go away. Get medical help right away. Call your local emergency services (911 in the [place] Do not drive yourself to the hospital.       Summary    •Cellulitis is a skin infection. This condition occurs most often in the arms and lower legs.      •Treatment for this condition may include medicines, such as antibiotic medicines or antihistamines.      •Take over-the-counter and prescription medicines only as told by your health care provider. If you were prescribed an antibiotic medicine, do not stop taking the antibiotic even if you start to feel better.      •Contact a health care provider if your symptoms do not begin to improve within 1–2 days of starting treatment or your symptoms get worse.      •Keep all follow-up visits as told by your health care provider. This is important. These visits let your health care provider make sure that a more serious infection is not developing.      This information is not intended to replace advice given to you by your health care provider. Make sure you discuss any questions you have with your health care provider.      Document Revised: 09/29/2022 Document Reviewed: 09/29/2022    Elsevier Patient Education © 2023 [employer]

## 2023-03-05 LAB
CULTURE RESULTS: SIGNIFICANT CHANGE UP
SPECIMEN SOURCE: SIGNIFICANT CHANGE UP

## 2023-03-07 LAB
AFP-TM SERPL-MCNC: 3.8 NG/ML
ALBUMIN SERPL ELPH-MCNC: 4.5 G/DL
ALP BLD-CCNC: 108 U/L
ALT SERPL-CCNC: 108 U/L
ANION GAP SERPL CALC-SCNC: 16 MMOL/L
AST SERPL-CCNC: 49 U/L
BILIRUB SERPL-MCNC: 0.3 MG/DL
BUN SERPL-MCNC: 11 MG/DL
CALCIUM SERPL-MCNC: 9.5 MG/DL
CHLORIDE SERPL-SCNC: 98 MMOL/L
CO2 SERPL-SCNC: 26 MMOL/L
CREAT SERPL-MCNC: 0.9 MG/DL
EGFR: 116 ML/MIN/1.73M2
GLUCOSE SERPL-MCNC: 106 MG/DL
HCG SERPL-MCNC: <1 MIU/ML
LDH SERPL-CCNC: 287 U/L
POTASSIUM SERPL-SCNC: 4.2 MMOL/L
PROT SERPL-MCNC: 6.7 G/DL
SODIUM SERPL-SCNC: 140 MMOL/L

## 2023-03-12 ENCOUNTER — EMERGENCY (EMERGENCY)
Facility: HOSPITAL | Age: 33
LOS: 1 days | Discharge: HOME | End: 2023-03-12
Attending: EMERGENCY MEDICINE
Payer: MEDICAID

## 2023-03-12 VITALS
WEIGHT: 298.95 LBS | HEART RATE: 90 BPM | OXYGEN SATURATION: 97 % | HEIGHT: 71 IN | DIASTOLIC BLOOD PRESSURE: 81 MMHG | SYSTOLIC BLOOD PRESSURE: 127 MMHG | RESPIRATION RATE: 18 BRPM | TEMPERATURE: 97 F

## 2023-03-12 LAB
ALBUMIN SERPL ELPH-MCNC: 3.7 G/DL — SIGNIFICANT CHANGE UP (ref 3.5–5)
ALP SERPL-CCNC: 103 U/L — SIGNIFICANT CHANGE UP (ref 40–120)
ALT FLD-CCNC: 187 U/L DA — HIGH (ref 10–60)
ANION GAP SERPL CALC-SCNC: 9 MMOL/L — SIGNIFICANT CHANGE UP (ref 5–17)
AST SERPL-CCNC: 89 U/L — HIGH (ref 10–40)
BASOPHILS # BLD AUTO: 0.03 K/UL — SIGNIFICANT CHANGE UP (ref 0–0.2)
BASOPHILS NFR BLD AUTO: 0.5 % — SIGNIFICANT CHANGE UP (ref 0–2)
BILIRUB SERPL-MCNC: 0.3 MG/DL — SIGNIFICANT CHANGE UP (ref 0.2–1.2)
BUN SERPL-MCNC: 11 MG/DL — SIGNIFICANT CHANGE UP (ref 7–18)
CALCIUM SERPL-MCNC: 8.8 MG/DL — SIGNIFICANT CHANGE UP (ref 8.4–10.5)
CHLORIDE SERPL-SCNC: 104 MMOL/L — SIGNIFICANT CHANGE UP (ref 96–108)
CO2 SERPL-SCNC: 24 MMOL/L — SIGNIFICANT CHANGE UP (ref 22–31)
CREAT SERPL-MCNC: 0.97 MG/DL — SIGNIFICANT CHANGE UP (ref 0.5–1.3)
EGFR: 106 ML/MIN/1.73M2 — SIGNIFICANT CHANGE UP
EOSINOPHIL # BLD AUTO: 0.22 K/UL — SIGNIFICANT CHANGE UP (ref 0–0.5)
EOSINOPHIL NFR BLD AUTO: 3.5 % — SIGNIFICANT CHANGE UP (ref 0–6)
GLUCOSE SERPL-MCNC: 130 MG/DL — HIGH (ref 70–99)
HCT VFR BLD CALC: 47.9 % — SIGNIFICANT CHANGE UP (ref 39–50)
HGB BLD-MCNC: 15.3 G/DL — SIGNIFICANT CHANGE UP (ref 13–17)
IMM GRANULOCYTES NFR BLD AUTO: 0.8 % — SIGNIFICANT CHANGE UP (ref 0–0.9)
LIDOCAIN IGE QN: 179 U/L — SIGNIFICANT CHANGE UP (ref 73–393)
LYMPHOCYTES # BLD AUTO: 2.39 K/UL — SIGNIFICANT CHANGE UP (ref 1–3.3)
LYMPHOCYTES # BLD AUTO: 37.8 % — SIGNIFICANT CHANGE UP (ref 13–44)
MCHC RBC-ENTMCNC: 25.7 PG — LOW (ref 27–34)
MCHC RBC-ENTMCNC: 31.9 GM/DL — LOW (ref 32–36)
MCV RBC AUTO: 80.5 FL — SIGNIFICANT CHANGE UP (ref 80–100)
MONOCYTES # BLD AUTO: 0.58 K/UL — SIGNIFICANT CHANGE UP (ref 0–0.9)
MONOCYTES NFR BLD AUTO: 9.2 % — SIGNIFICANT CHANGE UP (ref 2–14)
NEUTROPHILS # BLD AUTO: 3.05 K/UL — SIGNIFICANT CHANGE UP (ref 1.8–7.4)
NEUTROPHILS NFR BLD AUTO: 48.2 % — SIGNIFICANT CHANGE UP (ref 43–77)
NRBC # BLD: 0 /100 WBCS — SIGNIFICANT CHANGE UP (ref 0–0)
PLATELET # BLD AUTO: 185 K/UL — SIGNIFICANT CHANGE UP (ref 150–400)
POTASSIUM SERPL-MCNC: 3.8 MMOL/L — SIGNIFICANT CHANGE UP (ref 3.5–5.3)
POTASSIUM SERPL-SCNC: 3.8 MMOL/L — SIGNIFICANT CHANGE UP (ref 3.5–5.3)
PROT SERPL-MCNC: 7 G/DL — SIGNIFICANT CHANGE UP (ref 6–8.3)
RBC # BLD: 5.95 M/UL — HIGH (ref 4.2–5.8)
RBC # FLD: 14 % — SIGNIFICANT CHANGE UP (ref 10.3–14.5)
SODIUM SERPL-SCNC: 137 MMOL/L — SIGNIFICANT CHANGE UP (ref 135–145)
WBC # BLD: 6.32 K/UL — SIGNIFICANT CHANGE UP (ref 3.8–10.5)
WBC # FLD AUTO: 6.32 K/UL — SIGNIFICANT CHANGE UP (ref 3.8–10.5)

## 2023-03-12 PROCEDURE — 99284 EMERGENCY DEPT VISIT MOD MDM: CPT

## 2023-03-12 RX ORDER — ONDANSETRON 8 MG/1
4 TABLET, FILM COATED ORAL ONCE
Refills: 0 | Status: COMPLETED | OUTPATIENT
Start: 2023-03-12 | End: 2023-03-12

## 2023-03-12 RX ORDER — SODIUM CHLORIDE 9 MG/ML
1000 INJECTION INTRAMUSCULAR; INTRAVENOUS; SUBCUTANEOUS ONCE
Refills: 0 | Status: COMPLETED | OUTPATIENT
Start: 2023-03-12 | End: 2023-03-12

## 2023-03-12 RX ADMIN — ONDANSETRON 4 MILLIGRAM(S): 8 TABLET, FILM COATED ORAL at 22:59

## 2023-03-12 RX ADMIN — SODIUM CHLORIDE 1000 MILLILITER(S): 9 INJECTION INTRAMUSCULAR; INTRAVENOUS; SUBCUTANEOUS at 22:59

## 2023-03-12 NOTE — ED ADULT NURSE NOTE - CHIEF COMPLAINT QUOTE
[Negative] : Heme/Lymph
Patient states " I have stomach pain all over, it's swollen all day today, tight  "  Denies diarrhea/denies vomiting.  Claims had bowel movement yesterday .

## 2023-03-12 NOTE — ED ADULT NURSE NOTE - EXTENSIONS OF SELF_ADULT
Pt stated she is due for an appt. I informed the pt she has an appt on 6/5/19 which is exactly when she will be due for her 6 month f/u. Pt verbalized understanding.    None

## 2023-03-12 NOTE — ED ADULT TRIAGE NOTE - CHIEF COMPLAINT QUOTE
Patient states " I have stomach pain all over, it's swollen all day today, tight  "  Denies diarrhea/denies vomiting.  Claims had bowel movement yesterday .

## 2023-03-12 NOTE — ED ADULT NURSE NOTE - OBJECTIVE STATEMENT
33 yo male lying on bed c/o abdominal pain and swelling that started today. Patient denies nausea, vomiting, or diarrhea at this time. 33 yo male lying on bed c/o abdominal pain and swelling that started today. Patient denies vomiting or diarrhea at this time.

## 2023-03-13 PROCEDURE — 96374 THER/PROPH/DIAG INJ IV PUSH: CPT

## 2023-03-13 PROCEDURE — 81003 URINALYSIS AUTO W/O SCOPE: CPT

## 2023-03-13 PROCEDURE — 80053 COMPREHEN METABOLIC PANEL: CPT

## 2023-03-13 PROCEDURE — 83690 ASSAY OF LIPASE: CPT

## 2023-03-13 PROCEDURE — 99284 EMERGENCY DEPT VISIT MOD MDM: CPT | Mod: 25

## 2023-03-13 PROCEDURE — 85025 COMPLETE CBC W/AUTO DIFF WBC: CPT

## 2023-03-13 PROCEDURE — 36415 COLL VENOUS BLD VENIPUNCTURE: CPT

## 2023-03-13 NOTE — ED PROVIDER NOTE - OBJECTIVE STATEMENT
32-year-old male history of intellectual disability accompanied with staff member from [facility].  Patient states he had constipation prior to ED arrival.  On evaluation patient states he had 2 bowel movements in ED and now feels much better and is eager to be discharged.  No fever, no chest pain, no shortness of breath, no nausea, no vomiting. 32-year-old male history of intellectual disability accompanied with staff member (Shahriar) from [facility].  Patient states he had constipation prior to ED arrival.  On evaluation patient states he had 2 bowel movements in ED and now feels much better and is eager to be discharged.  No fever, no chest pain, no shortness of breath, no nausea, no vomiting.

## 2023-03-13 NOTE — ED PROVIDER NOTE - PATIENT PORTAL LINK FT
You can access the FollowMyHealth Patient Portal offered by [facility] by registering at the following website: [email or web address]. By joining [employer]’s FollowMyHealth portal, you will also be able to view your health information using other applications (apps) compatible with our system.

## 2023-03-13 NOTE — ED PROVIDER NOTE - NSFOLLOWUPINSTRUCTIONS_ED_ALL_ED_FT
Return to ER immediately if abdominal pain or any pain does not improve, worsens, or persists, fever, vomiting,  blood in stools or having black stools, unable to eat or drink, worsening/persistent diarrhea or constipation, any concerns. See your doctor as soon as possible (within 1-2 days).   If you need further assistance for appointments you can contact the [place] Care Coordinator at [phone]. In addition our outpatient [facility] is located at [address] [place] [address], tele: [phone].

## 2023-03-13 NOTE — ED PROVIDER NOTE - CLINICAL SUMMARY MEDICAL DECISION MAKING FREE TEXT BOX
Pt is well appearing, no guarding to repeat abdominal palpation, able to eat and drink without vomiting. Pt will f/u with GI-contact provided.   Pt has no new complaints and able to walk with normal gait. Pt is stable for discharge and follow up with medical doctor. Pt educated on care and need for follow up. Discussed anticipatory guidance and return precautions. Questions answered. I had a detailed discussion with the patient regarding the historical points, exam findings, and any diagnostic results supporting the discharge diagnosis.

## 2023-03-13 NOTE — ED PROVIDER NOTE - NSCAREINITIATED _GEN_ER
For information on Fall & Injury Prevention, visit: [email or web address] OR  [email or web address] OR  https://www.cdc.gov/steadi/patient.html
Len Shields(Attending)

## 2023-03-13 NOTE — ED PROVIDER NOTE - WET READ LAUNCH FT
How Severe Is It?: moderate
Is This A New Presentation, Or A Follow-Up?: Follow Up Isotretinoin
There are no Wet Read(s) to document.

## 2023-03-15 NOTE — ED ADULT NURSE NOTE - BRAND OF COVID-19 VACCINATION
Moderna dose 1 and 2 This is a  88 yo F from home, lives with , bedbound with 24hr HHA PMHx of Parkinson's disease, dementia, HTN, HLD presenting to the ED for worsening mental status.  and daughter at bedside providing collateral, state that patient's mental status appears to be worse and she hasn't been eating much,   noticed that today she was bringing up phlegm and she appeared to have difficulty swallowing. Family deny that she has had a cough. They deny any sick contacts, and she hasn't had any fevers or chills. Found to have rhino enterovirus on RVP. Admitted to medicine for  Sepsis with acute hypoxic respiratory failure. Pt w/ sepsis (HR 95, WBC 15), and AHRF on 3L NC. CXR and CTA showing no consolidations Enterovirus +. Started on Azithro and CTX eventually discontinued as BCx showed no growth. Strep PNA, and legionella negative. Pulm consulted Dr. Sotomayor. Started chest PT and hypertonic saline. Patient remained non-verbal, minimally responsive to verbal and tactile stimuli; and failed speech and swallow evaluation and was kept NPO. Electrolytes replaced with IVF. Patient had rhonci bilaterally and increased work of breathing with pursed lips. Palliative care was consulted who Met with the pt's spouse Gerardo and her daughter Kendra Jones  at the bedside, discussed her current clinical condition and goals of care. Explained that swallowing difficulties is common in advancing dementia. The person may have a weak swallow or lose the ability to swallow safely.  Pt with advanced dementia food and fluid intake tends to decrease slowly over time. Discussed the risk/ benefits of artificial nutrition.  PEG does not optimize pt's life but rather prolongs suffering.  Given her decline, they do not want her to suffer anymore and wish to focus on comfort measures only. Educated them about hospice philosophy, services , and locations provided.  They agreed for pt to be discharged home with [facility]CN, they already have 24/7 HHA in place to support pt. Discussed risks/benefits of LST such as CPR/ intubation, PEG in the context of advanced dementia and debility. Family aware these invasive measures will not optimize the pt's life but may cause more stress and pain. NATALIYA drafted DNR/DNI/no feeding tube/comfort measures only/DNH. Chaplaincy offered and deferred at the time. Palliative followed up the next day as patients clinical condition appeared to have worsened. Spoke with the pt's daughter Kendra via phone, explained that the pt condition has worsened with increased work of breathing requiring increased oxygen support.  She is appropriate for inpatient hospice for symptom management with IV medications for end of life care.  Kendra stated she needs to see the pt before making any decisions.  DNR/DNI on file. Later met with pt's daughter at the bedside, she stated she and her family are in agreement for inpatient hospice for symptom management with IV medications. Chaplaincy offered and accepted.

## 2023-03-27 ENCOUNTER — EMERGENCY (EMERGENCY)
Facility: HOSPITAL | Age: 33
LOS: 1 days | Discharge: HOME | End: 2023-03-27
Payer: MEDICAID

## 2023-03-27 VITALS — HEIGHT: 71 IN

## 2023-03-27 PROCEDURE — 99282 EMERGENCY DEPT VISIT SF MDM: CPT

## 2023-03-27 PROCEDURE — 99284 EMERGENCY DEPT VISIT MOD MDM: CPT

## 2023-03-27 NOTE — ED ADULT TRIAGE NOTE - CHIEF COMPLAINT QUOTE
ANALI Wyatt: I was not involved in the patient care and am only entering information to back log from downtime. please see paper chart as the mandatory fields may not be accurate.  sent by preopite for evaluation of surgical wound

## 2023-03-28 ENCOUNTER — EMERGENCY (EMERGENCY)
Facility: HOSPITAL | Age: 33
LOS: 1 days | Discharge: HOME | End: 2023-03-28
Attending: EMERGENCY MEDICINE
Payer: MEDICAID

## 2023-03-28 VITALS
HEIGHT: 71 IN | DIASTOLIC BLOOD PRESSURE: 77 MMHG | OXYGEN SATURATION: 96 % | RESPIRATION RATE: 18 BRPM | SYSTOLIC BLOOD PRESSURE: 134 MMHG | HEART RATE: 84 BPM | WEIGHT: 292.99 LBS | TEMPERATURE: 98 F

## 2023-03-28 LAB
BASOPHILS # BLD AUTO: 0.03 K/UL — SIGNIFICANT CHANGE UP (ref 0–0.2)
BASOPHILS NFR BLD AUTO: 0.4 % — SIGNIFICANT CHANGE UP (ref 0–2)
EOSINOPHIL # BLD AUTO: 0.24 K/UL — SIGNIFICANT CHANGE UP (ref 0–0.5)
EOSINOPHIL NFR BLD AUTO: 3.5 % — SIGNIFICANT CHANGE UP (ref 0–6)
HCT VFR BLD CALC: 44.2 % — SIGNIFICANT CHANGE UP (ref 39–50)
HGB BLD-MCNC: 14 G/DL — SIGNIFICANT CHANGE UP (ref 13–17)
IMM GRANULOCYTES NFR BLD AUTO: 0.7 % — SIGNIFICANT CHANGE UP (ref 0–0.9)
LYMPHOCYTES # BLD AUTO: 2.67 K/UL — SIGNIFICANT CHANGE UP (ref 1–3.3)
LYMPHOCYTES # BLD AUTO: 38.8 % — SIGNIFICANT CHANGE UP (ref 13–44)
MCHC RBC-ENTMCNC: 25.8 PG — LOW (ref 27–34)
MCHC RBC-ENTMCNC: 31.7 GM/DL — LOW (ref 32–36)
MCV RBC AUTO: 81.5 FL — SIGNIFICANT CHANGE UP (ref 80–100)
MONOCYTES # BLD AUTO: 0.64 K/UL — SIGNIFICANT CHANGE UP (ref 0–0.9)
MONOCYTES NFR BLD AUTO: 9.3 % — SIGNIFICANT CHANGE UP (ref 2–14)
NEUTROPHILS # BLD AUTO: 3.25 K/UL — SIGNIFICANT CHANGE UP (ref 1.8–7.4)
NEUTROPHILS NFR BLD AUTO: 47.3 % — SIGNIFICANT CHANGE UP (ref 43–77)
NRBC # BLD: 0 /100 WBCS — SIGNIFICANT CHANGE UP (ref 0–0)
PLATELET # BLD AUTO: 188 K/UL — SIGNIFICANT CHANGE UP (ref 150–400)
RBC # BLD: 5.42 M/UL — SIGNIFICANT CHANGE UP (ref 4.2–5.8)
RBC # FLD: 13.6 % — SIGNIFICANT CHANGE UP (ref 10.3–14.5)
WBC # BLD: 6.88 K/UL — SIGNIFICANT CHANGE UP (ref 3.8–10.5)
WBC # FLD AUTO: 6.88 K/UL — SIGNIFICANT CHANGE UP (ref 3.8–10.5)

## 2023-03-28 PROCEDURE — 99284 EMERGENCY DEPT VISIT MOD MDM: CPT

## 2023-03-28 PROCEDURE — 99283 EMERGENCY DEPT VISIT LOW MDM: CPT

## 2023-03-28 PROCEDURE — 83690 ASSAY OF LIPASE: CPT

## 2023-03-28 PROCEDURE — 36415 COLL VENOUS BLD VENIPUNCTURE: CPT

## 2023-03-28 PROCEDURE — 85025 COMPLETE CBC W/AUTO DIFF WBC: CPT

## 2023-03-28 PROCEDURE — 80053 COMPREHEN METABOLIC PANEL: CPT

## 2023-03-28 RX ORDER — ONDANSETRON 8 MG/1
4 TABLET, FILM COATED ORAL ONCE
Refills: 0 | Status: DISCONTINUED | OUTPATIENT
Start: 2023-03-28 | End: 2023-04-01

## 2023-03-28 RX ORDER — SODIUM CHLORIDE 9 MG/ML
1000 INJECTION INTRAMUSCULAR; INTRAVENOUS; SUBCUTANEOUS ONCE
Refills: 0 | Status: COMPLETED | OUTPATIENT
Start: 2023-03-28 | End: 2023-03-28

## 2023-03-28 RX ADMIN — SODIUM CHLORIDE 1000 MILLILITER(S): 9 INJECTION INTRAMUSCULAR; INTRAVENOUS; SUBCUTANEOUS at 23:43

## 2023-03-29 VITALS
DIASTOLIC BLOOD PRESSURE: 68 MMHG | HEART RATE: 71 BPM | SYSTOLIC BLOOD PRESSURE: 115 MMHG | OXYGEN SATURATION: 96 % | RESPIRATION RATE: 18 BRPM

## 2023-03-29 LAB
ALBUMIN SERPL ELPH-MCNC: 3.4 G/DL — LOW (ref 3.5–5)
ALP SERPL-CCNC: 95 U/L — SIGNIFICANT CHANGE UP (ref 40–120)
ALT FLD-CCNC: 127 U/L DA — HIGH (ref 10–60)
ANION GAP SERPL CALC-SCNC: 5 MMOL/L — SIGNIFICANT CHANGE UP (ref 5–17)
AST SERPL-CCNC: 56 U/L — HIGH (ref 10–40)
BILIRUB SERPL-MCNC: 0.3 MG/DL — SIGNIFICANT CHANGE UP (ref 0.2–1.2)
BUN SERPL-MCNC: 13 MG/DL — SIGNIFICANT CHANGE UP (ref 7–18)
CALCIUM SERPL-MCNC: 9.2 MG/DL — SIGNIFICANT CHANGE UP (ref 8.4–10.5)
CHLORIDE SERPL-SCNC: 105 MMOL/L — SIGNIFICANT CHANGE UP (ref 96–108)
CO2 SERPL-SCNC: 28 MMOL/L — SIGNIFICANT CHANGE UP (ref 22–31)
CREAT SERPL-MCNC: 0.86 MG/DL — SIGNIFICANT CHANGE UP (ref 0.5–1.3)
EGFR: 117 ML/MIN/1.73M2 — SIGNIFICANT CHANGE UP
GLUCOSE SERPL-MCNC: 123 MG/DL — HIGH (ref 70–99)
LIDOCAIN IGE QN: 189 U/L — SIGNIFICANT CHANGE UP (ref 73–393)
POTASSIUM SERPL-MCNC: 4 MMOL/L — SIGNIFICANT CHANGE UP (ref 3.5–5.3)
POTASSIUM SERPL-SCNC: 4 MMOL/L — SIGNIFICANT CHANGE UP (ref 3.5–5.3)
PROT SERPL-MCNC: 6.7 G/DL — SIGNIFICANT CHANGE UP (ref 6–8.3)
SODIUM SERPL-SCNC: 138 MMOL/L — SIGNIFICANT CHANGE UP (ref 135–145)

## 2023-03-29 NOTE — ED PROVIDER NOTE - OBJECTIVE STATEMENT
32-year-old male accompanied with staff member from group [facility].  Patient endorsed to triage that he had abdominal pain and diarrhea.  On my evaluation patient is resting in no acute distress and does not endorse any complaints.  Palpation of abdomen does not elicit any tenderness.  Patient's blood work is consistent with previous multiple visits.

## 2023-03-29 NOTE — ED PROVIDER NOTE - CONTACT TIME
By signing this assessment you are acknowledging and agree with the diagnosis/diagnoses assigned by the Registered Dietitian 29-Mar-2023 02:29

## 2023-03-29 NOTE — ED PROVIDER NOTE - PATIENT PORTAL LINK FT
You can access the FollowMyHealth Patient Portal offered by [facility] by registering at the following website: [email or web address]. By joining [employer]’s FollowMyHealth portal, you will also be able to view your health information using other applications (apps) compatible with our system.

## 2023-03-29 NOTE — ED PROVIDER NOTE - NSFOLLOWUPCLINICS_GEN_ALL_ED_FT
[facility]  Internal Medicine  [address] [place] [address]  Phone: [phone]  Fax: [phone]

## 2023-03-29 NOTE — ED PROVIDER NOTE - NSFOLLOWUPINSTRUCTIONS_ED_ALL_ED_FT
Return to ER immediately if abdominal pain or any pain does not improve, worsens, or persists, fever, vomiting,  blood in stools or having black stools, unable to eat or drink, worsening/persistent diarrhea or constipation, any concerns. See your doctor as soon as possible (within 1-2 days).   If you need further assistance for appointments you can contact the [place] Care Coordinator at [phone]. In addition our outpatient [facility] Clinic is located at [address] [place] [address], tele: [phone].

## 2023-03-29 NOTE — ED PROVIDER NOTE - CLINICAL SUMMARY MEDICAL DECISION MAKING FREE TEXT BOX
Pt is well appearing, no guarding to repeat abdominal palpation, able to eat and drink without vomiting. Pt will f/u with GI-contact provided. No ygrhjudi ain ED.  Pt is well appearing, has no new complaints and able to walk with normal gait. Pt is stable for discharge and follow up with medical doctor. Pt educated on care and need for follow up. Discussed anticipatory guidance and return precautions. Questions answered. I had a detailed discussion with the patient regarding the historical points, exam findings, and any diagnostic results supporting the discharge diagnosis.

## 2023-03-30 NOTE — ED PROVIDER NOTE - IV ALTEPLASE INCLUSION HIDDEN
[Coronary Artery Disease] : coronary artery disease [Dietary Modification] : dietary modification [Exercise Regimen] : an exercise regimen [Weight Reduction] : weight reduction [Hyperlipidemia] : hyperlipidemia [Stable] : stable [None] : There are no changes in medication management [Diet Modification] : diet modification [Exercise] : exercise [Weight Loss] : weight loss [Patient] : the patient [FreeTextEntry1] : ASHWINI  show

## 2023-03-31 NOTE — DOWNTIME INTERRUPTION NOTE - WHICH MANUAL FORMS INITIATED?
Geoffrey Colvin, PGY-3, MD: I was not involved in the care of this patient and am only entering information to back log for downtime. Regarding Emergency Department care during this visit/admission, please see paper chart for isolation and medical management details, as the mandatory fields in the disposition section may not be accurate.

## 2023-04-03 ENCOUNTER — APPOINTMENT (OUTPATIENT)
Dept: OTOLARYNGOLOGY | Facility: CLINIC | Age: 33
End: 2023-04-03

## 2023-04-03 NOTE — ED PROVIDER NOTE - NS ED MD DISPO SPECIAL CONSIDERATION1
[facility]    Received call from [facility] at [facility] with [employer] Complaint; Patient with Red Flag Complaint requesting to establish care with any office. Subjective: Caller states \"I have been on the depo shot for a year and I got dizzy last time. I did talk to the OBGYN about this and they said it is not from my depo shot\"     Current Symptoms: HA, lightheaded, dizzy, nausea    No triage needed. Spoke to OBGYN about symptoms. None

## 2023-04-10 NOTE — ED PROVIDER NOTE - CLINICAL SUMMARY MEDICAL DECISION MAKING FREE TEXT BOX
patient seen during downtime with paper chart, however, paper chart not found or missing information. will complete back log chart but please refer paper chart for most accurate information.     patient well known to this ED and presented with typical complaints. non-tender abdomen, no testicular pain or swelling. discharged back to group home.

## 2023-04-10 NOTE — ED PROVIDER NOTE - PHYSICAL EXAMINATION
General: well appearing male, no acute distress   HEENT: normocephalic, atraumatic   Respiratory: normal work of breathing  Abdomen: soft, non-tender, no guarding or rebound   MSK: no swelling or tenderness of lower extremities, moving all extremities spontaneously   Skin: warm, dry, well healed surgical incisions    Neuro: A&Ox3  Psych: appropriate affect

## 2023-04-10 NOTE — ED PROVIDER NOTE - PATIENT PORTAL LINK FT
You can access the FollowMyHealth Patient Portal offered by [facility] by registering at the following website: [email or web address]. By joining [employer]’s FollowMyHealth portal, you will also be able to view your health information using other applications (apps) compatible with our system.

## 2023-04-12 ENCOUNTER — EMERGENCY (EMERGENCY)
Facility: HOSPITAL | Age: 33
LOS: 1 days | Discharge: HOME | End: 2023-04-12
Attending: EMERGENCY MEDICINE
Payer: MEDICAID

## 2023-04-12 VITALS
DIASTOLIC BLOOD PRESSURE: 76 MMHG | SYSTOLIC BLOOD PRESSURE: 110 MMHG | TEMPERATURE: 98 F | RESPIRATION RATE: 17 BRPM | HEIGHT: 71 IN | OXYGEN SATURATION: 96 % | WEIGHT: 302.03 LBS | HEART RATE: 86 BPM

## 2023-04-12 LAB
ALBUMIN SERPL ELPH-MCNC: 3.3 G/DL — LOW (ref 3.5–5)
ALP SERPL-CCNC: 87 U/L — SIGNIFICANT CHANGE UP (ref 40–120)
ALT FLD-CCNC: 105 U/L DA — HIGH (ref 10–60)
ANION GAP SERPL CALC-SCNC: 2 MMOL/L — LOW (ref 5–17)
APPEARANCE UR: CLEAR — SIGNIFICANT CHANGE UP
APTT BLD: 31.5 SEC — SIGNIFICANT CHANGE UP (ref 27.5–35.5)
AST SERPL-CCNC: 46 U/L — HIGH (ref 10–40)
BACTERIA # UR AUTO: ABNORMAL /HPF
BASOPHILS # BLD AUTO: 0.04 K/UL — SIGNIFICANT CHANGE UP (ref 0–0.2)
BASOPHILS NFR BLD AUTO: 0.5 % — SIGNIFICANT CHANGE UP (ref 0–2)
BILIRUB SERPL-MCNC: 0.3 MG/DL — SIGNIFICANT CHANGE UP (ref 0.2–1.2)
BILIRUB UR-MCNC: NEGATIVE — SIGNIFICANT CHANGE UP
BUN SERPL-MCNC: 10 MG/DL — SIGNIFICANT CHANGE UP (ref 7–18)
CALCIUM SERPL-MCNC: 9.1 MG/DL — SIGNIFICANT CHANGE UP (ref 8.4–10.5)
CHLORIDE SERPL-SCNC: 108 MMOL/L — SIGNIFICANT CHANGE UP (ref 96–108)
CO2 SERPL-SCNC: 30 MMOL/L — SIGNIFICANT CHANGE UP (ref 22–31)
COLOR SPEC: YELLOW — SIGNIFICANT CHANGE UP
CREAT SERPL-MCNC: 0.87 MG/DL — SIGNIFICANT CHANGE UP (ref 0.5–1.3)
DIFF PNL FLD: NEGATIVE — SIGNIFICANT CHANGE UP
EGFR: 117 ML/MIN/1.73M2 — SIGNIFICANT CHANGE UP
EOSINOPHIL # BLD AUTO: 0.25 K/UL — SIGNIFICANT CHANGE UP (ref 0–0.5)
EOSINOPHIL NFR BLD AUTO: 3.4 % — SIGNIFICANT CHANGE UP (ref 0–6)
EPI CELLS # UR: ABNORMAL /HPF
GLUCOSE SERPL-MCNC: 117 MG/DL — HIGH (ref 70–99)
GLUCOSE UR QL: NEGATIVE — SIGNIFICANT CHANGE UP
HCT VFR BLD CALC: 41.7 % — SIGNIFICANT CHANGE UP (ref 39–50)
HGB BLD-MCNC: 13.2 G/DL — SIGNIFICANT CHANGE UP (ref 13–17)
IMM GRANULOCYTES NFR BLD AUTO: 1.1 % — HIGH (ref 0–0.9)
INR BLD: 0.99 RATIO — SIGNIFICANT CHANGE UP (ref 0.88–1.16)
KETONES UR-MCNC: ABNORMAL
LACTATE SERPL-SCNC: 1.7 MMOL/L — SIGNIFICANT CHANGE UP (ref 0.7–2)
LEUKOCYTE ESTERASE UR-ACNC: NEGATIVE — SIGNIFICANT CHANGE UP
LYMPHOCYTES # BLD AUTO: 2.55 K/UL — SIGNIFICANT CHANGE UP (ref 1–3.3)
LYMPHOCYTES # BLD AUTO: 35 % — SIGNIFICANT CHANGE UP (ref 13–44)
MCHC RBC-ENTMCNC: 25.9 PG — LOW (ref 27–34)
MCHC RBC-ENTMCNC: 31.7 GM/DL — LOW (ref 32–36)
MCV RBC AUTO: 81.8 FL — SIGNIFICANT CHANGE UP (ref 80–100)
MONOCYTES # BLD AUTO: 0.71 K/UL — SIGNIFICANT CHANGE UP (ref 0–0.9)
MONOCYTES NFR BLD AUTO: 9.7 % — SIGNIFICANT CHANGE UP (ref 2–14)
NEUTROPHILS # BLD AUTO: 3.66 K/UL — SIGNIFICANT CHANGE UP (ref 1.8–7.4)
NEUTROPHILS NFR BLD AUTO: 50.3 % — SIGNIFICANT CHANGE UP (ref 43–77)
NITRITE UR-MCNC: NEGATIVE — SIGNIFICANT CHANGE UP
NRBC # BLD: 0 /100 WBCS — SIGNIFICANT CHANGE UP (ref 0–0)
PH UR: 5 — SIGNIFICANT CHANGE UP (ref 5–8)
PLATELET # BLD AUTO: 203 K/UL — SIGNIFICANT CHANGE UP (ref 150–400)
POTASSIUM SERPL-MCNC: 4.2 MMOL/L — SIGNIFICANT CHANGE UP (ref 3.5–5.3)
POTASSIUM SERPL-SCNC: 4.2 MMOL/L — SIGNIFICANT CHANGE UP (ref 3.5–5.3)
PROT SERPL-MCNC: 6.4 G/DL — SIGNIFICANT CHANGE UP (ref 6–8.3)
PROT UR-MCNC: 30 MG/DL
PROTHROM AB SERPL-ACNC: 11.8 SEC — SIGNIFICANT CHANGE UP (ref 10.5–13.4)
RBC # BLD: 5.1 M/UL — SIGNIFICANT CHANGE UP (ref 4.2–5.8)
RBC # FLD: 13.7 % — SIGNIFICANT CHANGE UP (ref 10.3–14.5)
RBC CASTS # UR COMP ASSIST: SIGNIFICANT CHANGE UP /HPF (ref 0–2)
SARS-COV-2 RNA SPEC QL NAA+PROBE: SIGNIFICANT CHANGE UP
SODIUM SERPL-SCNC: 140 MMOL/L — SIGNIFICANT CHANGE UP (ref 135–145)
SP GR SPEC: 1.02 — SIGNIFICANT CHANGE UP (ref 1.01–1.02)
UROBILINOGEN FLD QL: 1 MG/DL
WBC # BLD: 7.29 K/UL — SIGNIFICANT CHANGE UP (ref 3.8–10.5)
WBC # FLD AUTO: 7.29 K/UL — SIGNIFICANT CHANGE UP (ref 3.8–10.5)
WBC UR QL: SIGNIFICANT CHANGE UP /HPF (ref 0–5)

## 2023-04-12 PROCEDURE — 83605 ASSAY OF LACTIC ACID: CPT

## 2023-04-12 PROCEDURE — 81001 URINALYSIS AUTO W/SCOPE: CPT

## 2023-04-12 PROCEDURE — 87635 SARS-COV-2 COVID-19 AMP PRB: CPT

## 2023-04-12 PROCEDURE — 80053 COMPREHEN METABOLIC PANEL: CPT

## 2023-04-12 PROCEDURE — 96374 THER/PROPH/DIAG INJ IV PUSH: CPT | Mod: XU

## 2023-04-12 PROCEDURE — 85025 COMPLETE CBC W/AUTO DIFF WBC: CPT

## 2023-04-12 PROCEDURE — 99284 EMERGENCY DEPT VISIT MOD MDM: CPT | Mod: 25

## 2023-04-12 PROCEDURE — 87040 BLOOD CULTURE FOR BACTERIA: CPT

## 2023-04-12 PROCEDURE — 85610 PROTHROMBIN TIME: CPT

## 2023-04-12 PROCEDURE — 99284 EMERGENCY DEPT VISIT MOD MDM: CPT

## 2023-04-12 PROCEDURE — 85730 THROMBOPLASTIN TIME PARTIAL: CPT

## 2023-04-12 PROCEDURE — 74177 CT ABD & PELVIS W/CONTRAST: CPT | Mod: MA

## 2023-04-12 PROCEDURE — 36415 COLL VENOUS BLD VENIPUNCTURE: CPT

## 2023-04-12 PROCEDURE — 74177 CT ABD & PELVIS W/CONTRAST: CPT | Mod: 26,MA

## 2023-04-12 RX ORDER — CEFTRIAXONE 500 MG/1
1000 INJECTION, POWDER, FOR SOLUTION INTRAMUSCULAR; INTRAVENOUS ONCE
Refills: 0 | Status: COMPLETED | OUTPATIENT
Start: 2023-04-12 | End: 2023-04-12

## 2023-04-12 RX ORDER — ACETAMINOPHEN 500 MG
650 TABLET ORAL ONCE
Refills: 0 | Status: COMPLETED | OUTPATIENT
Start: 2023-04-12 | End: 2023-04-12

## 2023-04-12 RX ADMIN — CEFTRIAXONE 100 MILLIGRAM(S): 500 INJECTION, POWDER, FOR SOLUTION INTRAMUSCULAR; INTRAVENOUS at 20:18

## 2023-04-12 RX ADMIN — Medication 650 MILLIGRAM(S): at 20:18

## 2023-04-12 NOTE — ED PROVIDER NOTE - NS ED ROS FT
Constitutional: no fever no chills  Eyes: no conjunctivitis  Ears: no ear pain no tinnitus  Nose: no nasal congestion, Mouth/Throat:  + throat pain, Neck: no stiffness  Cardiovascular: no chest pain  Respiratory: no shortness of breath no cough  Gastrointestinal: no abdominal pain, no vomiting no diarrhea  MSK: no joint pain  : no  dysuria  Skin: see hpi  Neuro: no LOC no seizures

## 2023-04-12 NOTE — ED PROVIDER NOTE - CLINICAL SUMMARY MEDICAL DECISION MAKING FREE TEXT BOX
ATTG: :  Assessment/plan: Sore throat we will check strep treat with pain medication reeval for dispo buttocks rash concerning for cellulitis we will check labs cultures start antibiotics and reeval for dispo

## 2023-04-12 NOTE — ED PROVIDER NOTE - OBJECTIVE STATEMENT
32-year-old male with PMHx of asthma, autism, developmental delay, dyslipidemia, GERD, hypothyroidism, HLD, presents from [facility] for 2 complaints.  States that he burned his throat yesterday drinking something hot and has discomfort in his throat as well as pain in his lower buttocks.  Patient states the pain has been headaches began 2 weeks ago and has a rash associated with it.  He said he was recently admitted for this as well and had improved but now returned.  Denies any fever or chills.  Had some bleeding associated with that initially but has improved.  There is no abscess.  There is no rectal bleeding currently.  There is no abdominal pain.

## 2023-04-12 NOTE — ED PROVIDER NOTE - NSFOLLOWUPINSTRUCTIONS_ED_ALL_ED_FT
Cellulitis, Adult  A person's legs and feet. One leg is normal and the other leg is affected by cellulitis.  Cellulitis is a skin infection. The infected area is usually warm, red, swollen, and tender. This condition occurs most often in the arms and lower legs. The infection can travel to the muscles, blood, and underlying tissue and become serious. It is very important to get treated for this condition.    What are the causes?  Cellulitis is caused by bacteria. The bacteria enter through a break in the skin, such as a cut, burn, insect bite, open sore, or crack.    What increases the risk?  This condition is more likely to occur in people who:  Have a weak body defense system (immune system).  Have open wounds on the skin, such as cuts, burns, bites, and scrapes. Bacteria can enter the body through these open wounds.  Are older than 60 years of age.  Have diabetes.  Have a type of long-lasting (chronic) liver disease (cirrhosis) or kidney disease.  Are obese.  Have a skin condition such as:  Itchy rash (eczema).  Slow movement of blood in the veins (venous stasis).  Fluid buildup below the skin (edema).  Have had radiation therapy.  Use IV drugs.  What are the signs or symptoms?  Symptoms of this condition include:  Redness, streaking, or spotting on the skin.  Swollen area of the skin.  Tenderness or pain when an area of the skin is touched.  Warm skin.  A fever.  Chills.  Blisters.  How is this diagnosed?  This condition is diagnosed based on a medical history and physical exam. You may also have tests, including:  Blood tests.  Imaging tests.  How is this treated?  Treatment for this condition may include:  Medicines, such as antibiotic medicines or medicines to treat allergies (antihistamines).  Supportive care, such as rest and application of cold or warm cloths (compresses) to the skin.  Hospital care, if the condition is severe.  The infection usually starts to get better within 1–2 days of treatment.    Follow these instructions at home:  A comparison of three sample cups showing dark yellow, yellow, and pale yellow urine.  Medicines    Take over-the-counter and prescription medicines only as told by your health care provider.  If you were prescribed an antibiotic medicine, take it as told by your health care provider. Do not stop taking the antibiotic even if you start to feel better.  General instructions    Drink enough fluid to keep your urine pale yellow.  Do not touch or rub the infected area.  Raise (elevate) the infected area above the level of your heart while you are sitting or lying down.  Apply warm or cold compresses to the affected area as told by your health care provider.  Keep all follow-up visits as told by your health care provider. This is important. These visits let your health care provider make sure a more serious infection is not developing.  Contact a health care provider if:  You have a fever.  Your symptoms do not begin to improve within 1–2 days of starting treatment.  Your bone or joint underneath the infected area becomes painful after the skin has healed.  Your infection returns in the same area or another area.  You notice a swollen bump in the infected area.  You develop new symptoms.  You have a general ill feeling (malaise) with muscle aches and pains.  Get help right away if:  Your symptoms get worse.  You feel very sleepy.  You develop vomiting or diarrhea that persists.  You notice red streaks coming from the infected area.  Your red area gets larger or turns dark in color.  These symptoms may represent a serious problem that is an emergency. Do not wait to see if the symptoms will go away. Get medical help right away. Call your local emergency services (911 in the [place] Do not drive yourself to the hospital.    Summary  Cellulitis is a skin infection. This condition occurs most often in the arms and lower legs.  Treatment for this condition may include medicines, such as antibiotic medicines or antihistamines.  Take over-the-counter and prescription medicines only as told by your health care provider. If you were prescribed an antibiotic medicine, do not stop taking the antibiotic even if you start to feel better.  Contact a health care provider if your symptoms do not begin to improve within 1–2 days of starting treatment or your symptoms get worse.  Keep all follow-up visits as told by your health care provider. This is important. These visits let your health care provider make sure that a more serious infection is not developing.  This information is not intended to replace advice given to you by your health care provider. Make sure you discuss any questions you have with your health care provider.    apply Bactroban to buttock 2 times a day  take Probiotics 3 hours after taking antibiotics  drink plenty of fluids  follow up with your medical doctor

## 2023-04-12 NOTE — ED PROVIDER NOTE - PATIENT PORTAL LINK FT
You can access the FollowMyHealth Patient Portal offered by [facility] by registering at the following website: [email or web address]. By joining "[employer]’s FollowMyHealth portal, you will also be able to view your health information using other applications (apps) compatible with our system.

## 2023-04-12 NOTE — ED PROVIDER NOTE - PROGRESS NOTE DETAILS
s.o. from Dr. Morejon  32 y.o. male c/o buttock rash for past 2 week, painful, itchy @ times, also with pain, fever last week, appetite good.  Pt noted blood when having BM  PE: obese, in no distress, Throat- no erythema, exudate, Heart-S1S2RR, Lung- clear,   Buttock-  Labs sent, will reassess pt coty buttock- erythematous, tenderness to palp, perineum, erythematous, tenderness to palp., no fluctuant.  Will get CT to r/o ly which is unlikely. s.o. from Dr. Morejon  32 y.o. male c/o buttock rash for past 2 week, painful, itchy @ times, also with pain, fever last week, appetite good.  Pt noted blood when having BM  PE: obese, in no distress, Throat- no erythema, exudate, Heart-S1S2RR, Lung- clear, Abd-soft, NT, ND  Buttock-  Labs sent, will reassess pt Labs/CT explained to DSP & pt  Pt with cellulitis, will d/c home on Augmentin, advised to f/u with PMD  Pt in no distress

## 2023-04-13 VITALS
DIASTOLIC BLOOD PRESSURE: 76 MMHG | TEMPERATURE: 98 F | RESPIRATION RATE: 18 BRPM | SYSTOLIC BLOOD PRESSURE: 125 MMHG | OXYGEN SATURATION: 96 % | HEART RATE: 72 BPM

## 2023-04-13 RX ORDER — MUPIROCIN 20 MG/G
1 OINTMENT TOPICAL
Qty: 80 | Refills: 0
Start: 2023-04-13 | End: 2023-04-22

## 2023-04-18 ENCOUNTER — EMERGENCY (EMERGENCY)
Facility: HOSPITAL | Age: 33
LOS: 1 days | Discharge: LEFT BEFORE DISCHARGE | End: 2023-04-18
Attending: EMERGENCY MEDICINE
Payer: MEDICAID

## 2023-04-18 VITALS
OXYGEN SATURATION: 97 % | HEART RATE: 90 BPM | RESPIRATION RATE: 17 BRPM | WEIGHT: 299.83 LBS | SYSTOLIC BLOOD PRESSURE: 160 MMHG | TEMPERATURE: 98 F | DIASTOLIC BLOOD PRESSURE: 70 MMHG | HEIGHT: 71 IN

## 2023-04-18 VITALS
DIASTOLIC BLOOD PRESSURE: 79 MMHG | SYSTOLIC BLOOD PRESSURE: 112 MMHG | TEMPERATURE: 98 F | OXYGEN SATURATION: 98 % | HEART RATE: 80 BPM | RESPIRATION RATE: 17 BRPM

## 2023-04-18 LAB
ALBUMIN SERPL ELPH-MCNC: 3.5 G/DL — SIGNIFICANT CHANGE UP (ref 3.5–5)
ALP SERPL-CCNC: 94 U/L — SIGNIFICANT CHANGE UP (ref 40–120)
ALT FLD-CCNC: 97 U/L DA — HIGH (ref 10–60)
ANION GAP SERPL CALC-SCNC: 6 MMOL/L — SIGNIFICANT CHANGE UP (ref 5–17)
AST SERPL-CCNC: 45 U/L — HIGH (ref 10–40)
BASOPHILS # BLD AUTO: 0.03 K/UL — SIGNIFICANT CHANGE UP (ref 0–0.2)
BASOPHILS NFR BLD AUTO: 0.5 % — SIGNIFICANT CHANGE UP (ref 0–2)
BILIRUB SERPL-MCNC: 0.3 MG/DL — SIGNIFICANT CHANGE UP (ref 0.2–1.2)
BUN SERPL-MCNC: 12 MG/DL — SIGNIFICANT CHANGE UP (ref 7–18)
CALCIUM SERPL-MCNC: 9.6 MG/DL — SIGNIFICANT CHANGE UP (ref 8.4–10.5)
CHLORIDE SERPL-SCNC: 103 MMOL/L — SIGNIFICANT CHANGE UP (ref 96–108)
CK SERPL-CCNC: 91 U/L — SIGNIFICANT CHANGE UP (ref 35–232)
CO2 SERPL-SCNC: 25 MMOL/L — SIGNIFICANT CHANGE UP (ref 22–31)
CREAT SERPL-MCNC: 0.96 MG/DL — SIGNIFICANT CHANGE UP (ref 0.5–1.3)
CULTURE RESULTS: SIGNIFICANT CHANGE UP
CULTURE RESULTS: SIGNIFICANT CHANGE UP
EGFR: 107 ML/MIN/1.73M2 — SIGNIFICANT CHANGE UP
EOSINOPHIL # BLD AUTO: 0.26 K/UL — SIGNIFICANT CHANGE UP (ref 0–0.5)
EOSINOPHIL NFR BLD AUTO: 3.9 % — SIGNIFICANT CHANGE UP (ref 0–6)
GLUCOSE SERPL-MCNC: 149 MG/DL — HIGH (ref 70–99)
HCT VFR BLD CALC: 44.7 % — SIGNIFICANT CHANGE UP (ref 39–50)
HGB BLD-MCNC: 14.5 G/DL — SIGNIFICANT CHANGE UP (ref 13–17)
HIV 1 & 2 AB SERPL IA.RAPID: SIGNIFICANT CHANGE UP
IMM GRANULOCYTES NFR BLD AUTO: 0.8 % — SIGNIFICANT CHANGE UP (ref 0–0.9)
LIDOCAIN IGE QN: 180 U/L — SIGNIFICANT CHANGE UP (ref 73–393)
LYMPHOCYTES # BLD AUTO: 2.32 K/UL — SIGNIFICANT CHANGE UP (ref 1–3.3)
LYMPHOCYTES # BLD AUTO: 34.8 % — SIGNIFICANT CHANGE UP (ref 13–44)
MAGNESIUM SERPL-MCNC: 1.9 MG/DL — SIGNIFICANT CHANGE UP (ref 1.6–2.6)
MCHC RBC-ENTMCNC: 25.7 PG — LOW (ref 27–34)
MCHC RBC-ENTMCNC: 32.4 GM/DL — SIGNIFICANT CHANGE UP (ref 32–36)
MCV RBC AUTO: 79.3 FL — LOW (ref 80–100)
MONOCYTES # BLD AUTO: 0.57 K/UL — SIGNIFICANT CHANGE UP (ref 0–0.9)
MONOCYTES NFR BLD AUTO: 8.6 % — SIGNIFICANT CHANGE UP (ref 2–14)
NEUTROPHILS # BLD AUTO: 3.43 K/UL — SIGNIFICANT CHANGE UP (ref 1.8–7.4)
NEUTROPHILS NFR BLD AUTO: 51.4 % — SIGNIFICANT CHANGE UP (ref 43–77)
NRBC # BLD: 0 /100 WBCS — SIGNIFICANT CHANGE UP (ref 0–0)
PLATELET # BLD AUTO: 188 K/UL — SIGNIFICANT CHANGE UP (ref 150–400)
POTASSIUM SERPL-MCNC: 4.8 MMOL/L — SIGNIFICANT CHANGE UP (ref 3.5–5.3)
POTASSIUM SERPL-SCNC: 4.8 MMOL/L — SIGNIFICANT CHANGE UP (ref 3.5–5.3)
PROT SERPL-MCNC: 7.1 G/DL — SIGNIFICANT CHANGE UP (ref 6–8.3)
RBC # BLD: 5.64 M/UL — SIGNIFICANT CHANGE UP (ref 4.2–5.8)
RBC # FLD: 13.8 % — SIGNIFICANT CHANGE UP (ref 10.3–14.5)
SODIUM SERPL-SCNC: 134 MMOL/L — LOW (ref 135–145)
SPECIMEN SOURCE: SIGNIFICANT CHANGE UP
SPECIMEN SOURCE: SIGNIFICANT CHANGE UP
TROPONIN I, HIGH SENSITIVITY RESULT: 6.4 NG/L — SIGNIFICANT CHANGE UP
WBC # BLD: 6.66 K/UL — SIGNIFICANT CHANGE UP (ref 3.8–10.5)
WBC # FLD AUTO: 6.66 K/UL — SIGNIFICANT CHANGE UP (ref 3.8–10.5)

## 2023-04-18 PROCEDURE — 80053 COMPREHEN METABOLIC PANEL: CPT

## 2023-04-18 PROCEDURE — 83735 ASSAY OF MAGNESIUM: CPT

## 2023-04-18 PROCEDURE — 71045 X-RAY EXAM CHEST 1 VIEW: CPT

## 2023-04-18 PROCEDURE — 84484 ASSAY OF TROPONIN QUANT: CPT

## 2023-04-18 PROCEDURE — 93005 ELECTROCARDIOGRAM TRACING: CPT

## 2023-04-18 PROCEDURE — 83690 ASSAY OF LIPASE: CPT

## 2023-04-18 PROCEDURE — 71045 X-RAY EXAM CHEST 1 VIEW: CPT | Mod: 26

## 2023-04-18 PROCEDURE — 82550 ASSAY OF CK (CPK): CPT

## 2023-04-18 PROCEDURE — 36415 COLL VENOUS BLD VENIPUNCTURE: CPT

## 2023-04-18 PROCEDURE — 85025 COMPLETE CBC W/AUTO DIFF WBC: CPT

## 2023-04-18 PROCEDURE — 86703 HIV-1/HIV-2 1 RESULT ANTBDY: CPT

## 2023-04-18 PROCEDURE — L9991: CPT

## 2023-04-18 NOTE — ED ADULT NURSE NOTE - OBJECTIVE STATEMENT
Pt AOx4, ambulatory, c/o palpitations since this morning. Pt accompanied by group home staff. Denies CP, SOB. PT has h/o SI, HI, Denies SI, HI at present time. EKG done, pt placed on cardiac monitor, no signs of distress noted.

## 2023-04-18 NOTE — ED ADULT NURSE NOTE - ED STAT RN HANDOFF DETAILS
Pt remains stable, AOX3, no s/s of any distress noted. IV line in place, no signs of infiltration noted. Tolerating diet. VS WNL. Call bell in reach, bed in lowest position. Safety maintained, hourly rounding performed. Endorsed to nurse Dewey.

## 2023-04-23 NOTE — ED PROVIDER NOTE - PRINCIPAL DIAGNOSIS
Subjective   History of Present Illness  56-year-old male presents with complaints of not feeling well for about a month.  He states he started having to wake up during the night urinating.  He states he is thirsty.  He states he sustained sunburn about a week ago he has had cramps he has felt hot and cold since then.  He has had some pain in his chest.  He has had no cough or shortness of breath no vomiting no diarrhea.  He does report increased urination.  He complains of fatigue with activity.  Review of Systems    No past medical history on file.  Negative  No Known Allergies    No past surgical history on file.    Family History   Problem Relation Age of Onset   • Heart failure Mother    • Heart attack Father        Social History     Socioeconomic History   • Marital status:    Tobacco Use   • Smoking status: Never   Substance and Sexual Activity   • Alcohol use: Yes     Alcohol/week: 7.0 - 10.0 standard drinks     Types: 7 - 10 Cans of beer per week     Baby aspirin      Objective   Physical Exam  56-year-old male awake alert.  Generally well-developed well-nourished.  Pupils equal round react light.  Oropharynx clear neck supple chest clear equal breath sounds.  Cardiovascular rhythm rate and rhythm abdomen soft nontender.  Neurologic exam hands without pronator drift or nose intact motor sensor intact to extremities without deficit.  Speech clear.  Skin without rash noted.  Procedures           ED Course      Results for orders placed or performed during the hospital encounter of 04/23/23   Comprehensive Metabolic Panel    Specimen: Arm, Left; Blood   Result Value Ref Range    Glucose 167 (H) 65 - 99 mg/dL    BUN 13 6 - 20 mg/dL    Creatinine 0.65 (L) 0.76 - 1.27 mg/dL    Sodium 137 136 - 145 mmol/L    Potassium 3.9 3.5 - 5.2 mmol/L    Chloride 101 98 - 107 mmol/L    CO2 25.0 22.0 - 29.0 mmol/L    Calcium 8.9 8.6 - 10.5 mg/dL    Total Protein 7.1 6.0 - 8.5 g/dL    Albumin 4.4 3.5 - 5.2 g/dL    ALT  "(SGPT) 27 1 - 41 U/L    AST (SGOT) 27 1 - 40 U/L    Alkaline Phosphatase 71 39 - 117 U/L    Total Bilirubin 0.6 0.0 - 1.2 mg/dL    Globulin 2.7 gm/dL    A/G Ratio 1.6 g/dL    BUN/Creatinine Ratio 20.0 7.0 - 25.0    Anion Gap 11.0 5.0 - 15.0 mmol/L    eGFR 109.9 >60.0 mL/min/1.73   Single High Sensitivity Troponin T    Specimen: Arm, Left; Blood   Result Value Ref Range    HS Troponin T 13 <15 ng/L   TSH    Specimen: Arm, Left; Blood   Result Value Ref Range    TSH 4.680 (H) 0.270 - 4.200 uIU/mL   Sedimentation Rate    Specimen: Arm, Left; Blood   Result Value Ref Range    Sed Rate 16 0 - 20 mm/hr   CBC Auto Differential    Specimen: Arm, Left; Blood   Result Value Ref Range    WBC 2.30 (L) 3.40 - 10.80 10*3/mm3    RBC 5.29 4.14 - 5.80 10*6/mm3    Hemoglobin 15.8 13.0 - 17.7 g/dL    Hematocrit 46.0 37.5 - 51.0 %    MCV 87.0 79.0 - 97.0 fL    MCH 29.9 26.6 - 33.0 pg    MCHC 34.4 31.5 - 35.7 g/dL    RDW 14.4 12.3 - 15.4 %    RDW-SD 46.4 37.0 - 54.0 fl    MPV 8.8 6.0 - 12.0 fL    Platelets 135 (L) 140 - 450 10*3/mm3    Neutrophil % 40.9 (L) 42.7 - 76.0 %    Lymphocyte % 40.2 19.6 - 45.3 %    Monocyte % 14.2 (H) 5.0 - 12.0 %    Eosinophil % 3.1 0.3 - 6.2 %    Basophil % 1.6 (H) 0.0 - 1.5 %    Neutrophils, Absolute 0.90 (L) 1.70 - 7.00 10*3/mm3    Lymphocytes, Absolute 0.90 0.70 - 3.10 10*3/mm3    Monocytes, Absolute 0.30 0.10 - 0.90 10*3/mm3    Eosinophils, Absolute 0.10 0.00 - 0.40 10*3/mm3    Basophils, Absolute 0.00 0.00 - 0.20 10*3/mm3    nRBC 0.2 0.0 - 0.2 /100 WBC   ECG 12 Lead Chest Pain   Result Value Ref Range    QT Interval 417 ms     XR Chest 1 View    Result Date: 4/23/2023  Impression: 1.No acute radiographic abnormality is identified. Electronically Signed: Sebas Lyle  4/23/2023 9:17 AM EDT  Workstation ID: [id number]    Medications - No data to display  BP (!) 150/106 (BP Location: Left arm, Patient Position: Sitting)   Pulse 81   Temp 97.9 °F (36.6 °C) (Oral)   Resp 16   Ht 190.5 cm (75\")   Wt 101 " kg (222 lb 10.6 oz)   SpO2 98%   BMI 27.83 kg/m²                                        [facility]  Chart review: Patient had office visit February this year for history of colon polyps.  No other information can be seen though.  He did have cardiology evaluation February 2021 for elevated calcium score and abnormal EKG he had stress test and echocardiogram ordered but he did not follow-up and had these performed.  Comorbidity: As per past history   Differential: Diabetes, thyroid abnormality, coronary artery disease.  My EKG interpretation: Right bundle branch block rate of 74.  He was noted to have right bundle branch block previously.  Lab: White count 2.3 with normal hemoglobin platelet count of 135 40 segs no bands.  Sed rate normal at 16 comprehensive metabolic panel glucose 167 creatinine low at 0.65 LFTs normal  My Radiology review and interpretation: Chest x-ray reveals normal heart size.  Normal pulmonary vasculature no pneumonia or mass  Discussion/treatment: Patient's findings were discussed with him.  He reports he just had water this morning making his blood work fasting.  We will check hemoglobin A1c we will also check T4.  The patient describes his headache as bandlike around his head suggesting tension headache Will check CT scan of head due.  We will also check lipid profile since this will be fasting.  Disposition was discussed with patient after discussion we will place in observation to rule out cardiac etiology for his exertional fatigue in light of abnormal cardiac calcium score from a couple of years ago.  We will also check echocardiogram.  It was discussed suspect he probably does have mild new onset diabetes with fasting blood sugar 167 but will check A1c.   Patient was evaluated using appropriate PPE      Final diagnoses:   Other fatigue   Hyperglycemia   Thrombocytopenia   Tension-type headache, not intractable, unspecified chronicity pattern       ED Disposition  ED Disposition     ED  Disposition   Decision to Admit    Condition   --    Comment   --             No follow-up provider specified.       Medication List      No changes were made to your prescriptions during this visit.          Lester Hahn MD  04/23/23 3101     Mood disorder

## 2023-05-07 ENCOUNTER — NON-APPOINTMENT (OUTPATIENT)
Age: 33
End: 2023-05-07

## 2023-05-08 ENCOUNTER — EMERGENCY (EMERGENCY)
Facility: HOSPITAL | Age: 33
LOS: 1 days | End: 2023-05-08
Payer: MEDICAID

## 2023-05-08 VITALS
SYSTOLIC BLOOD PRESSURE: 123 MMHG | DIASTOLIC BLOOD PRESSURE: 91 MMHG | WEIGHT: 294.98 LBS | OXYGEN SATURATION: 98 % | HEIGHT: 71 IN | TEMPERATURE: 99 F | HEART RATE: 88 BPM | RESPIRATION RATE: 19 BRPM

## 2023-05-08 PROCEDURE — L9991: CPT

## 2023-05-12 ENCOUNTER — NON-APPOINTMENT (OUTPATIENT)
Age: 33
End: 2023-05-12

## 2023-05-14 ENCOUNTER — EMERGENCY (EMERGENCY)
Facility: HOSPITAL | Age: 33
LOS: 1 days | Discharge: HOME | End: 2023-05-14
Attending: STUDENT IN AN ORGANIZED HEALTH CARE EDUCATION/TRAINING PROGRAM
Payer: MEDICAID

## 2023-05-14 VITALS
OXYGEN SATURATION: 95 % | HEART RATE: 81 BPM | SYSTOLIC BLOOD PRESSURE: 111 MMHG | RESPIRATION RATE: 17 BRPM | DIASTOLIC BLOOD PRESSURE: 76 MMHG | TEMPERATURE: 98 F

## 2023-05-14 VITALS
DIASTOLIC BLOOD PRESSURE: 73 MMHG | HEART RATE: 81 BPM | HEIGHT: 71 IN | RESPIRATION RATE: 17 BRPM | WEIGHT: 294.98 LBS | OXYGEN SATURATION: 95 % | TEMPERATURE: 99 F | SYSTOLIC BLOOD PRESSURE: 100 MMHG

## 2023-05-14 PROCEDURE — 99283 EMERGENCY DEPT VISIT LOW MDM: CPT | Mod: 25

## 2023-05-14 PROCEDURE — 71046 X-RAY EXAM CHEST 2 VIEWS: CPT | Mod: 26

## 2023-05-14 PROCEDURE — 71046 X-RAY EXAM CHEST 2 VIEWS: CPT

## 2023-05-14 PROCEDURE — 99284 EMERGENCY DEPT VISIT MOD MDM: CPT

## 2023-05-14 PROCEDURE — 99284 EMERGENCY DEPT VISIT MOD MDM: CPT | Mod: 25

## 2023-05-14 PROCEDURE — 94640 AIRWAY INHALATION TREATMENT: CPT

## 2023-05-14 RX ORDER — IPRATROPIUM/ALBUTEROL SULFATE 18-103MCG
3 AEROSOL WITH ADAPTER (GRAM) INHALATION ONCE
Refills: 0 | Status: COMPLETED | OUTPATIENT
Start: 2023-05-14 | End: 2023-05-14

## 2023-05-14 RX ADMIN — Medication 3 MILLILITER(S): at 14:17

## 2023-05-14 NOTE — ED PROVIDER NOTE - NSFOLLOWUPINSTRUCTIONS_ED_ALL_ED_FT
You were seen in the emergency department for a cough likely caused by an asthma exacerbation.     Please use your inhaler as prescribed.     If you have any worsening symptoms, severe headache, chest pain, trouble breathing, please return to the emergency department.

## 2023-05-14 NOTE — ED PROVIDER NOTE - PATIENT PORTAL LINK FT
You can access the FollowMyHealth Patient Portal offered by [facility] by registering at the following website: [email or web address]. By joining [employer]’s FollowMyHealth portal, you will also be able to view your health information using other applications (apps) compatible with our system.

## 2023-05-14 NOTE — ED PROVIDER NOTE - OBJECTIVE STATEMENT
33M, pmh of  asthma, autism, developmental delay, dyslipidemia, GERD, hypothyroidism, HLD, presenting with cough and shortness of breath.

## 2023-05-14 NOTE — ED ADULT NURSE NOTE - NS ED NURSE RECORD ANOTHER HT AND WT
Patient arrived to MPU 4 for recovery of a biliary tube exchange. Vs stable see assessment in computer.   Yes

## 2023-05-14 NOTE — ED PROVIDER NOTE - CLINICAL SUMMARY MEDICAL DECISION MAKING FREE TEXT BOX
33M presenting with cough. breathing comfortably on room air. cxr without focal consolidations or pneumothorax. likely asthma exacerbation. stable for outpatient followup.

## 2023-05-14 NOTE — ED ADULT NURSE NOTE - NSFALLUNIVINTERV_ED_ALL_ED
Bed/Stretcher in lowest position, wheels locked, appropriate side rails in place/Call bell, personal items and telephone in reach/Instruct patient to call for assistance before getting out of bed/chair/stretcher/Non-slip footwear applied when patient is off stretcher/[place] to call system/Physically safe environment - no spills, clutter or unnecessary equipment/Purposeful proactive rounding/Room/bathroom lighting operational, light cord in reach

## 2023-05-18 NOTE — ED ADULT NURSE NOTE - CAS TRG GEN SKIN CONDITION
Warm
Pt a/ox4, responds and plays with parents, guarded toward left head bump.  Parents reports head injury from fall at home. Gait stable, no other signs of acute distress noted.

## 2023-05-25 ENCOUNTER — OUTPATIENT (OUTPATIENT)
Dept: OUTPATIENT SERVICES | Facility: HOSPITAL | Age: 33
LOS: 1 days | Discharge: HOME | End: 2023-05-25

## 2023-05-25 DIAGNOSIS — C62.90 MALIGNANT NEOPLASM OF UNSPECIFIED TESTIS, UNSPECIFIED WHETHER DESCENDED OR UNDESCENDED: ICD-10-CM

## 2023-05-26 ENCOUNTER — INPATIENT (INPATIENT)
Facility: HOSPITAL | Age: 33
LOS: 10 days | Discharge: HOME | End: 2023-06-06
Attending: PSYCHIATRY & NEUROLOGY | Admitting: PSYCHIATRY & NEUROLOGY
Payer: MEDICAID

## 2023-05-26 VITALS
TEMPERATURE: 99 F | RESPIRATION RATE: 16 BRPM | HEIGHT: 71 IN | SYSTOLIC BLOOD PRESSURE: 118 MMHG | HEART RATE: 99 BPM | DIASTOLIC BLOOD PRESSURE: 101 MMHG | OXYGEN SATURATION: 100 %

## 2023-05-26 PROCEDURE — 99285 EMERGENCY DEPT VISIT HI MDM: CPT

## 2023-05-26 NOTE — ED PROVIDER NOTE - CLINICAL SUMMARY MEDICAL DECISION MAKING FREE TEXT BOX
34 y/o  M hx Asthma, DM, Bipolar D/O   Medical evaluation performed. There is no clinical evidence of intoxication or any acute medical problem requiring immediate intervention. Patient is awaiting psychiatric consultation. Final disposition will be determined by psychiatrist.

## 2023-05-26 NOTE — ED PROVIDER NOTE - OBJECTIVE STATEMENT
34 y/o  M hx Asthma, DM, Bipolar D/O  BIBA secondary to suicidal and homicidal ideations  . Admits to wanting to stab people in his Group Home.    Admits to medication compliance.  Denies AH/VH. Denies falling, punching or kicking any objects. Denies pain, SOB, fever, chills, chest/abdominal discomfort. Denies recent use of alcohol or illicit drugs.  No evidence of physical injuries, broken  skin or deformities.

## 2023-05-26 NOTE — ED ADULT NURSE NOTE - NSFALLUNIVINTERV_ED_ALL_ED
Bed/Stretcher in lowest position, wheels locked, appropriate side rails in place/Call bell, personal items and telephone in reach/Instruct patient to call for assistance before getting out of bed/chair/stretcher/Non-slip footwear applied when patient is off stretcher/[place] to call system/Physically safe environment - no spills, clutter or unnecessary equipment/Purposeful proactive rounding/Room/bathroom lighting operational, light cord in reach

## 2023-05-26 NOTE — ED ADULT NURSE NOTE - NS ED NURSE LEVEL OF CONSCIOUSNESS ORIENTATION
Oriented - self; Oriented - place; Oriented - time/Hallucination - audio/Ideation - homicidal/Ideation - suicidal

## 2023-05-26 NOTE — ED BEHAVIORAL HEALTH NOTE - BEHAVIORAL HEALTH NOTE
As per request of provider, writer contacted [employer] ([phone]) to obtain collateral information. Writer spoke [facility] tana who advised writer to contact patient’s residence at [phone].    Writer contacted patient’s residence [facility]  [phone] to obtain collateral information.  Writer spoke w/ [facility] overnight staff Fredo who provided the following information.     Patient is a 34 yo male domiciled at residence, hx of autism,  enrolled w/ [employer], bib EMS activated  by staff. Evening staff reports he gotten agitated and was walking around. When he gets angry/agitated he will punch the walls, has broke his arms in the past and has fought with the  and gets tased. Fredo unsure what exactly the patient did today. He does not believe he hurt anyone today. He is unsure of nay current si or hi but says patient has a hx of homicidal statements often and will say he wants to hurt the workers often. He is unsure of any prior suicide attempts. He reports patient lives in a constant crisis and gets agitated easily. He says patient has many ER vsiits and he is unsure about patient’s last psych hospitalization. He says the patient endorses hearing voices when he is not doing well and suspects he is currently hearing voices. He reports patient is not on a 1 to 1 at the residence. He says the patient can go from “0-100” often. Patient does not attend day program currently and says in the pst he would get into fights. He reports medical problems include diabetes. Patient is covid vaccinated and has no recent trave or exposure. He reports patient does not have access to fire arms or weapons. He reports when the patient is not in a good mood he will go to the room and punch a wall or grab a pen. He says the patient will say he needs to go to the hospital when angry and when staff are struggling to redirect him. Fredo reports that he will email over patient’s medication list and states he is compliant w/ medication. Writer agreed to keep him updated.

## 2023-05-26 NOTE — ED ADULT TRIAGE NOTE - CHIEF COMPLAINT QUOTE
Pt arrives to ED from home via EMS for S/I and H/I  Pt seen at [facility] yesterday for both psych and medical visit but was cleared and discharged medically by hospital and was not seen by a psychiatrist.  Pt feels that he may go home and "stab someone."  Bipolar, anxiety, ADHD, impulse control disorder, borderline, suicide attempts. DM, fs = 207 by EMS.

## 2023-05-26 NOTE — ED PROVIDER NOTE - PROGRESS NOTE DETAILS
Rec'd signout on this pt from SAUNDRA Claudine:  32yo M here for agitation/aggressive behavior pending psych eval, has since been evaluated by psychiatry and will be admitted to [facility].  -Dr. Mayer

## 2023-05-26 NOTE — ED ADULT NURSE NOTE - CHIEF COMPLAINT QUOTE
Pt arrives to ED from home via EMS for S/I and H/I  Pt seen at [facility] yesterday for both psych and medical visit but was cleared and discharged medically by hospital and was not seen by a psychiatrist.  Pt feels that he may go home and "stab someone."  Bipolar, anxiety, ADHD, impulse control disorder, borderline, suicide attempts. DM, fs = 207 by EMS.

## 2023-05-26 NOTE — ED ADULT NURSE NOTE - OBJECTIVE STATEMENT
Pt presents to , alert&orientedx4, ambulatory, hx of Intellectual disability, mood disorder, Autism, HLD coming to ED for homicidal thoughts and auditory hallucinations. Pt seen and cleared for psychiatric and medical evaluations at [facility] yesterday. On arrival, pt is calm and cooperative, able to follow verbal commands, fairly groomed and speech is normal. Pt also reports Si with no active plan at this time, hx of self-injurious behavior "I use to cut myself a long time ago". Safety measures maintained.

## 2023-05-27 DIAGNOSIS — F84.0 AUTISTIC DISORDER: ICD-10-CM

## 2023-05-27 DIAGNOSIS — F90.2 ATTENTION-DEFICIT HYPERACTIVITY DISORDER, COMBINED TYPE: ICD-10-CM

## 2023-05-27 DIAGNOSIS — F39 UNSPECIFIED MOOD [AFFECTIVE] DISORDER: ICD-10-CM

## 2023-05-27 DIAGNOSIS — F33.2 MAJOR DEPRESSIVE DISORDER, RECURRENT SEVERE WITHOUT PSYCHOTIC FEATURES: ICD-10-CM

## 2023-05-27 LAB
ALBUMIN SERPL ELPH-MCNC: 4.2 G/DL — SIGNIFICANT CHANGE UP (ref 3.3–5)
ALP SERPL-CCNC: 89 U/L — SIGNIFICANT CHANGE UP (ref 40–120)
ALT FLD-CCNC: 92 U/L — HIGH (ref 4–41)
AMPHET UR-MCNC: NEGATIVE — SIGNIFICANT CHANGE UP
ANION GAP SERPL CALC-SCNC: 16 MMOL/L — HIGH (ref 7–14)
APAP SERPL-MCNC: <10 UG/ML — LOW (ref 15–25)
APPEARANCE UR: CLEAR — SIGNIFICANT CHANGE UP
AST SERPL-CCNC: 47 U/L — HIGH (ref 4–40)
B PERT DNA SPEC QL NAA+PROBE: SIGNIFICANT CHANGE UP
B PERT+PARAPERT DNA PNL SPEC NAA+PROBE: SIGNIFICANT CHANGE UP
BACTERIA # UR AUTO: NEGATIVE — SIGNIFICANT CHANGE UP
BARBITURATES UR SCN-MCNC: NEGATIVE — SIGNIFICANT CHANGE UP
BASOPHILS # BLD AUTO: 0.04 K/UL — SIGNIFICANT CHANGE UP (ref 0–0.2)
BASOPHILS NFR BLD AUTO: 0.6 % — SIGNIFICANT CHANGE UP (ref 0–2)
BENZODIAZ UR-MCNC: NEGATIVE — SIGNIFICANT CHANGE UP
BILIRUB SERPL-MCNC: 0.3 MG/DL — SIGNIFICANT CHANGE UP (ref 0.2–1.2)
BILIRUB UR-MCNC: NEGATIVE — SIGNIFICANT CHANGE UP
BORDETELLA PARAPERTUSSIS (RAPRVP): SIGNIFICANT CHANGE UP
BUN SERPL-MCNC: 11 MG/DL — SIGNIFICANT CHANGE UP (ref 7–23)
C PNEUM DNA SPEC QL NAA+PROBE: SIGNIFICANT CHANGE UP
CALCIUM SERPL-MCNC: 9.3 MG/DL — SIGNIFICANT CHANGE UP (ref 8.4–10.5)
CHLORIDE SERPL-SCNC: 102 MMOL/L — SIGNIFICANT CHANGE UP (ref 98–107)
CO2 SERPL-SCNC: 19 MMOL/L — LOW (ref 22–31)
COCAINE METAB.OTHER UR-MCNC: NEGATIVE — SIGNIFICANT CHANGE UP
COLOR SPEC: YELLOW — SIGNIFICANT CHANGE UP
CREAT SERPL-MCNC: 0.73 MG/DL — SIGNIFICANT CHANGE UP (ref 0.5–1.3)
CREATININE URINE RESULT, DAU: 333 MG/DL — SIGNIFICANT CHANGE UP
DIFF PNL FLD: NEGATIVE — SIGNIFICANT CHANGE UP
EGFR: 122 ML/MIN/1.73M2 — SIGNIFICANT CHANGE UP
EOSINOPHIL # BLD AUTO: 0.29 K/UL — SIGNIFICANT CHANGE UP (ref 0–0.5)
EOSINOPHIL NFR BLD AUTO: 4.1 % — SIGNIFICANT CHANGE UP (ref 0–6)
EPI CELLS # UR: 3 /HPF — SIGNIFICANT CHANGE UP (ref 0–5)
ETHANOL SERPL-MCNC: <10 MG/DL — SIGNIFICANT CHANGE UP
FLUAV SUBTYP SPEC NAA+PROBE: SIGNIFICANT CHANGE UP
FLUBV RNA SPEC QL NAA+PROBE: SIGNIFICANT CHANGE UP
GLUCOSE SERPL-MCNC: 136 MG/DL — HIGH (ref 70–99)
GLUCOSE UR QL: NEGATIVE — SIGNIFICANT CHANGE UP
HADV DNA SPEC QL NAA+PROBE: SIGNIFICANT CHANGE UP
HCOV 229E RNA SPEC QL NAA+PROBE: SIGNIFICANT CHANGE UP
HCOV HKU1 RNA SPEC QL NAA+PROBE: SIGNIFICANT CHANGE UP
HCOV NL63 RNA SPEC QL NAA+PROBE: SIGNIFICANT CHANGE UP
HCOV OC43 RNA SPEC QL NAA+PROBE: SIGNIFICANT CHANGE UP
HCT VFR BLD CALC: 45.3 % — SIGNIFICANT CHANGE UP (ref 39–50)
HGB BLD-MCNC: 14.5 G/DL — SIGNIFICANT CHANGE UP (ref 13–17)
HMPV RNA SPEC QL NAA+PROBE: SIGNIFICANT CHANGE UP
HPIV1 RNA SPEC QL NAA+PROBE: SIGNIFICANT CHANGE UP
HPIV2 RNA SPEC QL NAA+PROBE: SIGNIFICANT CHANGE UP
HPIV3 RNA SPEC QL NAA+PROBE: SIGNIFICANT CHANGE UP
HPIV4 RNA SPEC QL NAA+PROBE: SIGNIFICANT CHANGE UP
HYALINE CASTS # UR AUTO: 2 /LPF — SIGNIFICANT CHANGE UP (ref 0–7)
IANC: 3.38 K/UL — SIGNIFICANT CHANGE UP (ref 1.8–7.4)
IMM GRANULOCYTES NFR BLD AUTO: 0.8 % — SIGNIFICANT CHANGE UP (ref 0–0.9)
KETONES UR-MCNC: ABNORMAL
LEUKOCYTE ESTERASE UR-ACNC: ABNORMAL
LYMPHOCYTES # BLD AUTO: 2.87 K/UL — SIGNIFICANT CHANGE UP (ref 1–3.3)
LYMPHOCYTES # BLD AUTO: 40.1 % — SIGNIFICANT CHANGE UP (ref 13–44)
M PNEUMO DNA SPEC QL NAA+PROBE: SIGNIFICANT CHANGE UP
MCHC RBC-ENTMCNC: 25.5 PG — LOW (ref 27–34)
MCHC RBC-ENTMCNC: 32 GM/DL — SIGNIFICANT CHANGE UP (ref 32–36)
MCV RBC AUTO: 79.8 FL — LOW (ref 80–100)
METHADONE UR-MCNC: NEGATIVE — SIGNIFICANT CHANGE UP
MONOCYTES # BLD AUTO: 0.52 K/UL — SIGNIFICANT CHANGE UP (ref 0–0.9)
MONOCYTES NFR BLD AUTO: 7.3 % — SIGNIFICANT CHANGE UP (ref 2–14)
NEUTROPHILS # BLD AUTO: 3.38 K/UL — SIGNIFICANT CHANGE UP (ref 1.8–7.4)
NEUTROPHILS NFR BLD AUTO: 47.1 % — SIGNIFICANT CHANGE UP (ref 43–77)
NITRITE UR-MCNC: NEGATIVE — SIGNIFICANT CHANGE UP
NRBC # BLD: 0 /100 WBCS — SIGNIFICANT CHANGE UP (ref 0–0)
NRBC # FLD: 0 K/UL — SIGNIFICANT CHANGE UP (ref 0–0)
OPIATES UR-MCNC: NEGATIVE — SIGNIFICANT CHANGE UP
OXYCODONE UR-MCNC: NEGATIVE — SIGNIFICANT CHANGE UP
PCP SPEC-MCNC: SIGNIFICANT CHANGE UP
PCP UR-MCNC: NEGATIVE — SIGNIFICANT CHANGE UP
PH UR: 6 — SIGNIFICANT CHANGE UP (ref 5–8)
PLATELET # BLD AUTO: 187 K/UL — SIGNIFICANT CHANGE UP (ref 150–400)
POTASSIUM SERPL-MCNC: 4.4 MMOL/L — SIGNIFICANT CHANGE UP (ref 3.5–5.3)
POTASSIUM SERPL-SCNC: 4.4 MMOL/L — SIGNIFICANT CHANGE UP (ref 3.5–5.3)
PROT SERPL-MCNC: 6.7 G/DL — SIGNIFICANT CHANGE UP (ref 6–8.3)
PROT UR-MCNC: ABNORMAL
RAPID RVP RESULT: SIGNIFICANT CHANGE UP
RBC # BLD: 5.68 M/UL — SIGNIFICANT CHANGE UP (ref 4.2–5.8)
RBC # FLD: 13.7 % — SIGNIFICANT CHANGE UP (ref 10.3–14.5)
RBC CASTS # UR COMP ASSIST: 5 /HPF — HIGH (ref 0–4)
RSV RNA SPEC QL NAA+PROBE: SIGNIFICANT CHANGE UP
RV+EV RNA SPEC QL NAA+PROBE: SIGNIFICANT CHANGE UP
SALICYLATES SERPL-MCNC: <0.3 MG/DL — LOW (ref 15–30)
SARS-COV-2 RNA SPEC QL NAA+PROBE: SIGNIFICANT CHANGE UP
SODIUM SERPL-SCNC: 137 MMOL/L — SIGNIFICANT CHANGE UP (ref 135–145)
SP GR SPEC: 1.03 — SIGNIFICANT CHANGE UP (ref 1.01–1.05)
THC UR QL: NEGATIVE — SIGNIFICANT CHANGE UP
TOXICOLOGY SCREEN, DRUGS OF ABUSE, SERUM RESULT: SIGNIFICANT CHANGE UP
TSH SERPL-MCNC: 4.58 UIU/ML — HIGH (ref 0.27–4.2)
UROBILINOGEN FLD QL: SIGNIFICANT CHANGE UP
WBC # BLD: 7.16 K/UL — SIGNIFICANT CHANGE UP (ref 3.8–10.5)
WBC # FLD AUTO: 7.16 K/UL — SIGNIFICANT CHANGE UP (ref 3.8–10.5)
WBC UR QL: 7 /HPF — HIGH (ref 0–5)

## 2023-05-27 PROCEDURE — 99285 EMERGENCY DEPT VISIT HI MDM: CPT

## 2023-05-27 PROCEDURE — 99221 1ST HOSP IP/OBS SF/LOW 40: CPT

## 2023-05-27 RX ORDER — DIVALPROEX SODIUM 500 MG/1
500 TABLET, DELAYED RELEASE ORAL
Refills: 0 | Status: DISCONTINUED | OUTPATIENT
Start: 2023-05-27 | End: 2023-06-06

## 2023-05-27 RX ORDER — CHLORPROMAZINE HCL 10 MG
50 TABLET ORAL ONCE
Refills: 0 | Status: DISCONTINUED | OUTPATIENT
Start: 2023-05-27 | End: 2023-05-31

## 2023-05-27 RX ORDER — PANTOPRAZOLE SODIUM 20 MG/1
20 TABLET, DELAYED RELEASE ORAL
Refills: 0 | Status: DISCONTINUED | OUTPATIENT
Start: 2023-05-27 | End: 2023-06-06

## 2023-05-27 RX ORDER — FERROUS SULFATE 325(65) MG
325 TABLET ORAL DAILY
Refills: 0 | Status: DISCONTINUED | OUTPATIENT
Start: 2023-05-27 | End: 2023-05-31

## 2023-05-27 RX ORDER — ATORVASTATIN CALCIUM 80 MG/1
10 TABLET, FILM COATED ORAL AT BEDTIME
Refills: 0 | Status: DISCONTINUED | OUTPATIENT
Start: 2023-05-27 | End: 2023-06-06

## 2023-05-27 RX ORDER — CHLORPROMAZINE HCL 10 MG
50 TABLET ORAL ONCE
Refills: 0 | Status: DISCONTINUED | OUTPATIENT
Start: 2023-05-27 | End: 2023-05-27

## 2023-05-27 RX ORDER — LORATADINE 10 MG/1
10 TABLET ORAL DAILY
Refills: 0 | Status: DISCONTINUED | OUTPATIENT
Start: 2023-05-27 | End: 2023-06-06

## 2023-05-27 RX ORDER — LANOLIN ALCOHOL/MO/W.PET/CERES
5 CREAM (GRAM) TOPICAL AT BEDTIME
Refills: 0 | Status: DISCONTINUED | OUTPATIENT
Start: 2023-05-27 | End: 2023-06-06

## 2023-05-27 RX ORDER — RISPERIDONE 4 MG/1
4 TABLET ORAL
Refills: 0 | Status: DISCONTINUED | OUTPATIENT
Start: 2023-05-27 | End: 2023-06-06

## 2023-05-27 RX ORDER — TAMSULOSIN HYDROCHLORIDE 0.4 MG/1
0.4 CAPSULE ORAL AT BEDTIME
Refills: 0 | Status: DISCONTINUED | OUTPATIENT
Start: 2023-05-27 | End: 2023-06-06

## 2023-05-27 RX ORDER — CHLORPROMAZINE HCL 10 MG
50 TABLET ORAL EVERY 6 HOURS
Refills: 0 | Status: DISCONTINUED | OUTPATIENT
Start: 2023-05-27 | End: 2023-05-31

## 2023-05-27 RX ORDER — SENNA PLUS 8.6 MG/1
2 TABLET ORAL AT BEDTIME
Refills: 0 | Status: DISCONTINUED | OUTPATIENT
Start: 2023-05-27 | End: 2023-06-06

## 2023-05-27 RX ORDER — ALBUTEROL 90 UG/1
2 AEROSOL, METERED ORAL EVERY 6 HOURS
Refills: 0 | Status: DISCONTINUED | OUTPATIENT
Start: 2023-05-27 | End: 2023-06-06

## 2023-05-27 RX ORDER — BACITRACIN ZINC 500 UNIT/G
1 OINTMENT IN PACKET (EA) TOPICAL THREE TIMES A DAY
Refills: 0 | Status: DISCONTINUED | OUTPATIENT
Start: 2023-05-27 | End: 2023-06-06

## 2023-05-27 RX ORDER — LEVOTHYROXINE SODIUM 125 MCG
50 TABLET ORAL DAILY
Refills: 0 | Status: DISCONTINUED | OUTPATIENT
Start: 2023-05-27 | End: 2023-06-06

## 2023-05-27 RX ORDER — METFORMIN HYDROCHLORIDE 850 MG/1
1000 TABLET ORAL
Refills: 0 | Status: DISCONTINUED | OUTPATIENT
Start: 2023-05-27 | End: 2023-06-06

## 2023-05-27 RX ORDER — MIRTAZAPINE 45 MG/1
15 TABLET, ORALLY DISINTEGRATING ORAL AT BEDTIME
Refills: 0 | Status: DISCONTINUED | OUTPATIENT
Start: 2023-05-27 | End: 2023-06-06

## 2023-05-27 RX ADMIN — Medication 50 MILLIGRAM(S): at 14:29

## 2023-05-27 RX ADMIN — SENNA PLUS 2 TABLET(S): 8.6 TABLET ORAL at 20:24

## 2023-05-27 RX ADMIN — LORATADINE 10 MILLIGRAM(S): 10 TABLET ORAL at 09:47

## 2023-05-27 RX ADMIN — MIRTAZAPINE 15 MILLIGRAM(S): 45 TABLET, ORALLY DISINTEGRATING ORAL at 20:24

## 2023-05-27 RX ADMIN — Medication 5 MILLIGRAM(S): at 20:24

## 2023-05-27 RX ADMIN — DIVALPROEX SODIUM 500 MILLIGRAM(S): 500 TABLET, DELAYED RELEASE ORAL at 20:24

## 2023-05-27 RX ADMIN — TAMSULOSIN HYDROCHLORIDE 0.4 MILLIGRAM(S): 0.4 CAPSULE ORAL at 20:23

## 2023-05-27 RX ADMIN — RISPERIDONE 4 MILLIGRAM(S): 4 TABLET ORAL at 09:47

## 2023-05-27 RX ADMIN — Medication 50 MICROGRAM(S): at 06:11

## 2023-05-27 RX ADMIN — ATORVASTATIN CALCIUM 10 MILLIGRAM(S): 80 TABLET, FILM COATED ORAL at 20:23

## 2023-05-27 RX ADMIN — RISPERIDONE 4 MILLIGRAM(S): 4 TABLET ORAL at 20:24

## 2023-05-27 RX ADMIN — Medication 325 MILLIGRAM(S): at 09:47

## 2023-05-27 RX ADMIN — Medication 15 MILLIGRAM(S): at 09:46

## 2023-05-27 RX ADMIN — METFORMIN HYDROCHLORIDE 1000 MILLIGRAM(S): 850 TABLET ORAL at 09:47

## 2023-05-27 RX ADMIN — METFORMIN HYDROCHLORIDE 1000 MILLIGRAM(S): 850 TABLET ORAL at 17:22

## 2023-05-27 RX ADMIN — DIVALPROEX SODIUM 500 MILLIGRAM(S): 500 TABLET, DELAYED RELEASE ORAL at 09:47

## 2023-05-27 NOTE — BH INPATIENT PSYCHIATRY ASSESSMENT NOTE - RISK ASSESSMENT
Patient is currently unable to engage in safety planning, he is endorsing active suicidal and homicidal thoughts, he is currently a danger to self and other and he requires psychiatric hospitalization for safety, stabilization and treatment.

## 2023-05-27 NOTE — ED BEHAVIORAL HEALTH ASSESSMENT NOTE - DIFFERENTIAL
MDD, moderate intellectual disability, ASD, unspecified personality disorders, impulse control disorders, conduct disorder, factitious disorder, HAVEN, mood disorders, PTSD and ADHD

## 2023-05-27 NOTE — ED BEHAVIORAL HEALTH ASSESSMENT NOTE - PATIENT'S CHIEF COMPLAINT
"something is wrong with me, my meds are not working, I want to end my life, I want to hurt the staff at my group home"

## 2023-05-27 NOTE — BH INPATIENT PSYCHIATRY ASSESSMENT NOTE - DETAILS
Reported plans to hurt staff in his group home Per chart has had a rash in response to Zyprexa Per chart has prior trauma(details unknown) endorses plan to stab himself, denies other plans for suicide.

## 2023-05-27 NOTE — BH INPATIENT PSYCHIATRY ASSESSMENT NOTE - NSBHATTESTAPPBILLTIME_PSY_A_CORE
I attest my time as MASHA is greater than 50% of the total combined time spent on qualifying patient care activities. I have reviewed and verified the documentation.

## 2023-05-27 NOTE — BH INPATIENT PSYCHIATRY ASSESSMENT NOTE - HPI (INCLUDE ILLNESS QUALITY, SEVERITY, DURATION, TIMING, CONTEXT, MODIFYING FACTORS, ASSOCIATED SIGNS AND SYMPTOMS)
As per [facility] ED Assessment:   "Pt is a 34 yo M, single, disabled, non-caregiver, resides at a [facility], with psych h/o moderate ID, ASD, impulse control disorder, factitious disorder, HAVEN, mood disorders, PTSD and ADHD, multiple past psych admissions (last known [facility] 12/19-12/23/21), numerous ED visits for both medical/psych complaints, longstanding h/o SIB (head banging, cutting), but no known SA, longstanding h/o endorsing SI, h/o aggression toward staff at , PMH significant for asthma, DM, urinary retention, GERD, BPH, hypothyroidism, HLD, HTN, and b/l myopia, on Depakote 500 mg BID, Guanfacine 2 mg, Risperdal 4 mg, and Remeron 15 mg, prescribed by his PCP, who presents to the ED for suicidal and homicidal thoughts to kill self and kill  staff.      On assessment, pt presents with concrete thought process.  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  He endorses that he would kick staff members or would stab himself.  Patient is unable to engage in safety planning.  Patient is unable to identify any specific trigger for why these thoughts have surfaced today. He reported that feel like his medications are not working. He endorses depressed mood, his affect is flat and nonreactive.  Denies manic symptoms.   Denies auditory or visual hallucinations, denies paranoid ideations. Denies alcohol/substance use.     KEKE contacted staff at his group home for collateral ([facility]  [phone]). KEKE spoke w/ [facility] overnight staff Fredo who provided the following information: "Patient is a 34 yo male domiciled at residence, hx of autism,  enrolled w/ [employer], bib EMS activated  by staff. Evening staff reports he gotten agitated and was walking around. When he gets angry/agitated he will punch the walls, has broke his arms in the past and has fought with the  and gets tased. Fredo unsure what exactly the patient did today. He does not believe he hurt anyone today. He is unsure of nay current si or hi but says patient has a hx of homicidal statements often and will say he wants to hurt the workers often. He is unsure of any prior suicide attempts. He reports patient lives in a constant crisis and gets agitated easily. He says patient has many ER vsiits and he is unsure about patient’s last psych hospitalization. He says the patient endorses hearing voices when he is not doing well and suspects he is currently hearing voices. He reports patient is not on a 1 to 1 at the residence. He says the patient can go from “0-100” often. Patient does not attend day program currently and says in the pst he would get into fights. He reports medical problems include diabetes. Patient is covid vaccinated and has no recent trave or exposure. He reports patient does not have access to fire arms or weapons. He reports when the patient is not in a good mood he will go to the room and punch a wall or grab a pen. He says the patient will say he needs to go to the hospital when angry and when staff are struggling to redirect him."    Upon assessment on the unit, patient is calm and cooperative but oddly related. He is smiling inappropriately while endorsing SI and HI towards his aide. Patient endorses +AH "sometimes" but is unable to further elaborate. He stated that he had SI with plan to stab himself. Patient also reported drinking beer and vaping but couldn't provide further details. Patient is child-like and is a poor historian. He is focused on going to a new group home and starting a diet while hospitalized. Patient is on CO and home medications continued.

## 2023-05-27 NOTE — ED BEHAVIORAL HEALTH ASSESSMENT NOTE - DETAILS
Per chart has prior trauma(details unknown) endorses plan to stab himself, denies other plans for suicide. Reported plans to hurt staff. Group home notified [facility] Per chart has had a rash in response to Zyprexa

## 2023-05-27 NOTE — ED BEHAVIORAL HEALTH ASSESSMENT NOTE - NSBHMSESPEECH_PSY_A_CORE
Continue Regimen: Differin gel qhs Plan: Cetaphil wash\\nCetaphil moisturizer Detail Level: Zone Render In Strict Bullet Format?: No Initiate Treatment: Aczone qd Normal volume, rate, productivity, spontaneity and articulation

## 2023-05-27 NOTE — BH PATIENT PROFILE - FALL HARM RISK - UNIVERSAL INTERVENTIONS
Bed in lowest position, wheels locked, appropriate side rails in place/Call bell, personal items and telephone in reach/Instruct patient to call for assistance before getting out of bed or chair/Non-slip footwear when patient is out of bed/[place] to call system/Physically safe environment - no spills, clutter or unnecessary equipment/Purposeful Proactive Rounding/Room/bathroom lighting operational, light cord in reach

## 2023-05-27 NOTE — BH INPATIENT PSYCHIATRY ASSESSMENT NOTE - CURRENT MEDICATION
MEDICATIONS  (STANDING):  atorvastatin 10 milliGRAM(s) Oral at bedtime  busPIRone 15 milliGRAM(s) Oral two times a day  divalproex  milliGRAM(s) Oral two times a day  ferrous    sulfate 325 milliGRAM(s) Oral daily  levothyroxine 50 MICROGram(s) Oral daily  loratadine 10 milliGRAM(s) Oral daily  metFORMIN 1000 milliGRAM(s) Oral two times a day with meals  mirtazapine 15 milliGRAM(s) Oral at bedtime  pantoprazole    Tablet 20 milliGRAM(s) Oral before breakfast  risperiDONE   Tablet 4 milliGRAM(s) Oral two times a day  senna 2 Tablet(s) Oral at bedtime  tamsulosin 0.4 milliGRAM(s) Oral at bedtime    MEDICATIONS  (PRN):  albuterol    90 MICROgram(s) HFA Inhaler 2 Puff(s) Inhalation every 6 hours PRN Shortness of Breath  chlorproMAZINE    Injectable 50 milliGRAM(s) IntraMuscular once PRN Agitation  chlorproMAZINE    Tablet 50 milliGRAM(s) Oral every 6 hours PRN Agitation

## 2023-05-27 NOTE — ED BEHAVIORAL HEALTH ASSESSMENT NOTE - RISK ASSESSMENT
Patient is currently unable to engage in safety planning, she is endorsing active suicidal and homicidal thoughts, he is currently a danger to self and other and he requires psychiatric hospitalization for safety, stabilization and treatment.

## 2023-05-27 NOTE — BH PATIENT PROFILE - HOME MEDICATIONS
mupirocin 2% topical cream , Apply topically to affected area 2 times a day  amoxicillin-clavulanate 875 mg-125 mg oral tablet , 1 tab(s) orally 2 times a day  ibuprofen 600 mg oral tablet , 1 tab(s) orally every 6 hours   cephalexin 500 mg oral tablet , 1 tab(s) orally 4 times a day   ondansetron 4 mg oral tablet, disintegrating , 1 tab(s) orally 3 times a day  as needed for nausea   ketoconazole 2% topical cream , Apply topically to affected area once a day   ibuprofen 600 mg oral tablet , 1 tab(s) orally every 6 hours   oxyCODONE 5 mg oral tablet , 1 tab(s) orally every 6 hours MDD:4 tabs  oxycodone-acetaminophen 5 mg-325 mg oral tablet , 1 tab(s) orally every 6 hours, As Needed -Moderate Pain (4 - 6) MDD:4  risperiDONE 4 mg oral tablet , 1 tab(s) orally 2 times a day  guanFACINE 2 mg oral tablet , 1 tab(s) orally once a day  divalproex sodium 500 mg oral tablet, extended release , 1 tab(s) orally 2 times a day  pantoprazole 20 mg oral delayed release tablet , 1 tab(s) orally once a day  LORazepam 1 mg oral tablet , 1 tab(s) orally once a day (at bedtime)  loperamide 2 mg oral tablet, chewable , 1 tab(s) chewed 2 times a day, As Needed -for diarrhea   MiraLax oral powder for reconstitution , 17 gram(s) orally once a day, As Needed -for congestion - for constipation   senna oral tablet , 2 tab(s) orally once a day (at bedtime)   pravastatin 20 mg oral tablet , 1 tab(s) orally once a day  busPIRone 15 mg oral tablet , 1 tab(s) orally every 12 hours  albuterol 90 mcg/inh inhalation aerosol , 2 puff(s) inhaled every 6 hours, As needed, Shortness of Breath and/or Wheezing  ferrous sulfate 325 mg (65 mg elemental iron) oral tablet , 1 tab(s) orally once a day  tamsulosin 0.4 mg oral capsule , 1 cap(s) orally once a day (at bedtime)  mirtazapine 15 mg oral tablet , 1 tab(s) orally once a day (at bedtime)  levothyroxine 50 mcg (0.05 mg) oral tablet , 1 tab(s) orally once a day in AM

## 2023-05-27 NOTE — ED ADULT NURSE NOTE - CAS EDN DISCHARGE ASSESSMENT
Recurrent issue  Sore throat over the past 2 weeks  Felt like her tonsils were swollen  Has been doing salt gargles       No sick contacts  Neg covid at home  Getting better     Her seasonal allergies are bad   Alert and oriented to person, place and time

## 2023-05-27 NOTE — BH INPATIENT PSYCHIATRY ASSESSMENT NOTE - NSICDXBHSECONDARYDX_PSY_ALL_CORE
Autism spectrum disorder   F84.0  Attention deficit hyperactivity disorder (ADHD), combined type   F90.2

## 2023-05-27 NOTE — BH INPATIENT PSYCHIATRY ASSESSMENT NOTE - NSBHMETABOLIC_PSY_ALL_CORE_FT
BMI: BMI (kg/m2): 38.4 (05-27-23 @ 03:18)  HbA1c:   Glucose: POCT Blood Glucose.: 130 mg/dL (09-28-22 @ 11:25)    BP: 143/92 (05-27-23 @ 00:20) (118/101 - 143/92)  Lipid Panel:

## 2023-05-27 NOTE — ED BEHAVIORAL HEALTH ASSESSMENT NOTE - CURRENT MEDICATION
Risperdal 4mg BID, Depakote 500mg BID, Remeron 15mg QHS, Buspar 15mg QHS, Guanfacine 2mg daily in AM, Pravastatin 20mg daily, Loratidine 10mg daily, Levothyroxine 50mcg daily, Metformin 1000mg BID. Pantoprazole 20mg daily, Tamsulosin 0.4mg QHS, Ferrous Sulfate 325mg daily, senna 8.6mg 2 tabs QHS.

## 2023-05-27 NOTE — BH INPATIENT PSYCHIATRY ASSESSMENT NOTE - NSBHCHARTREVIEWVS_PSY_A_CORE FT
Vital Signs Last 24 Hrs  T(C): 36.7 (05-27-23 @ 03:18), Max: 37.1 (05-26-23 @ 21:23)  T(F): 98 (05-27-23 @ 03:18), Max: 98.8 (05-26-23 @ 21:23)  HR: 88 (05-27-23 @ 00:20) (88 - 99)  BP: 143/92 (05-27-23 @ 00:20) (118/101 - 143/92)  BP(mean): --  RR: 17 (05-27-23 @ 03:18) (16 - 18)  SpO2: 100% (05-27-23 @ 00:20) (100% - 100%)    Orthostatic VS  05-27-23 @ 03:18  Lying BP: --/-- HR: --  Sitting BP: 134/96 HR: 90  Standing BP: 122/83 HR: 106  Site: --  Mode: --  Orthostatic VS  05-27-23 @ 03:06  Lying BP: --/-- HR: --  Sitting BP: 134/90 HR: 98  Standing BP: 127/89 HR: 90  Site: --  Mode: --

## 2023-05-27 NOTE — BH INPATIENT PSYCHIATRY ASSESSMENT NOTE - ATTENDING COMMENTS
Patient seen by me individually for initial inpatient interview.  I was physically present during the service to the patient and personally examined the patient and I was directly involved in the management plan and recommendations of the care provided to the patient. I have reviewed the admission record and reviewed the patient’s physical examination, review of systems and admission labs. I have discussed the case with the NP, and I have reviewed the NP’s admission assessment. I agree with the above admission progress notes’ contents and recommendations, and I have made changes as indicated.      32 yo M, resides at a [facility], with psych h/o moderate ID, ASD, impulse control disorder, factitious disorder, HAVEN, mood disorders, PTSD and ADHD, multiple past psych admissions (last known [facility] 12/19-12/23/21), numerous ED visits for both medical/psych complaints, longstanding h/o SIB (head banging, cutting), but no known SA, longstanding h/o endorsing SI, h/o aggression toward staff at , PMH significant for asthma, DM, urinary retention, GERD, BPH, hypothyroidism, HLD, HTN, and b/l myopia, on Depakote 500 mg BID, Guanfacine 2 mg, Risperdal 4 mg, and Remeron 15 mg, prescribed by his PCP, who presents to the ED for suicidal and homicidal thoughts to kill self and kill  staff.  On Unit is calm and feels safe and asks to "stay here a long time" and "find a new residence"  PLAN:   1. Admit to Low 6, 9.39  2. CO for safety  3. Continue Home medications:  Risperdal 4mg BID, Depakote 500mg BID, Remeron 15mg QHS, Buspar 15mg QHS, Guanfacine 2mg daily in AM, Pravastatin 20mg daily, Loratadine 10mg daily, Levothyroxine 50mcg daily, Metformin 1000mg BID, Pantoprazole 20mg daily, Tamsulosin 0.4mg QHS, Ferrous Sulfate 325mg daily, senna 8.6mg 2 tabs QHS.

## 2023-05-27 NOTE — ED BEHAVIORAL HEALTH ASSESSMENT NOTE - HPI (INCLUDE ILLNESS QUALITY, SEVERITY, DURATION, TIMING, CONTEXT, MODIFYING FACTORS, ASSOCIATED SIGNS AND SYMPTOMS)
Pt is a 32 yo M, single, disabled, non-caregiver, resides at a [facility], with psych h/o moderate ID, ASD, impulse control disorder, factitious disorder, HAVEN, mood disorders, PTSD and ADHD, multiple past psych admissions (last known [facility] 12/19-12/23/21), numerous ED visits for both medical/psych complaints, longstanding h/o SIB (head banging, cutting), but no known SA, longstanding h/o endorsing SI, h/o aggression toward staff at , PMH significant for asthma, DM, urinary retention, GERD, BPH, hypothyroidism, HLD, HTN, and b/l myopia, on Depakote 500 mg BID, Guanfacine 2 mg, Risperdal 4 mg, and Remeron 15 mg, prescribed by his PCP, who presents to the ED for suicidal and homicidal thoughts to kill self and kill  staff.      On assessment, pt presents with concrete thought process.  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  He endorses that he would kick staff members or would stab himself.  Patient is unable to engage in safety planning.  Patient is unable to identify any specific trigger for why these thoughts have surfaced today. He reported that feel like his medications are not working. He endorses depressed mood, his affect is flat and nonreactive.  Denies manic symptoms.   Denies auditory or visual hallucinations, denies paranoid ideations. Denies alcohol/substance use.     KEKE contacted staff at his group home for collateral ([facility]  [phone]). KEKE spoke w/ [facility] overnight staff Fredo who provided the following information: "Patient is a 32 yo male domiciled at residence, hx of autism,  enrolled w/ [employer], bib EMS activated  by staff. Evening staff reports he gotten agitated and was walking around. When he gets angry/agitated he will punch the walls, has broke his arms in the past and has fought with the  and gets tased. Fredo unsure what exactly the patient did today. He does not believe he hurt anyone today. He is unsure of nay current si or hi but says patient has a hx of homicidal statements often and will say he wants to hurt the workers often. He is unsure of any prior suicide attempts. He reports patient lives in a constant crisis and gets agitated easily. He says patient has many ER vsiits and he is unsure about patient’s last psych hospitalization. He says the patient endorses hearing voices when he is not doing well and suspects he is currently hearing voices. He reports patient is not on a 1 to 1 at the residence. He says the patient can go from “0-100” often. Patient does not attend day program currently and says in the pst he would get into fights. He reports medical problems include diabetes. Patient is covid vaccinated and has no recent trave or exposure. He reports patient does not have access to fire arms or weapons. He reports when the patient is not in a good mood he will go to the room and punch a wall or grab a pen. He says the patient will say he needs to go to the hospital when angry and when staff are struggling to redirect him."

## 2023-05-27 NOTE — PSYCHIATRIC REHAB INITIAL EVALUATION - NSBHPRRECOMMEND_PSY_ALL_CORE
Writer met with patient to introduce him to psychiatric rehabilitation staff and services. Patient was receptive to meeting with writer and his reports were congruent with ED note. However, patient's facial expressions were incongruent to his reported mood and reason for admission, and he was superficially engaged with circumstantial thought process.   Upon admission, patient reportedly self presented to the ED for worsening symptoms of depression and thoughts to kill himself and the staff at his group home. Per chart, patient has PPHx of moderate ID, ASD, impulse control disorder, factitious disorder, mood lability, PTSD, ADHD, and multiple past psychiatric admissions and frequent ED visits. As writer attempted to gather information regarding the events leading up to this admission, patient smiled elatedly as he reported that he is suicidal, hearing voices,  and does not want to live at his group home. Patient reported that the voices are telling him to kill himself and the staff at his group residence. Patient was unable to identify a trigger for these symptoms. Patient expressed that he is not having thoughts to hurt himself or anyone else while on the unit, but endorsed passive SI and auditory hallucinations.   Patient was able to collaborate with writer to establish an appropriate treatment goal for him to work on during this inpatient stay. Patient reported that he needs additional coping skills that assist him in regulating his emotions and replace self-harm tendencies. Psychiatric rehabilitation staff will provide continuous support and encourage the patient to participate in the therapeutic milieu in efforts to assist him in meeting his treatment goal.

## 2023-05-27 NOTE — ED BEHAVIORAL HEALTH ASSESSMENT NOTE - DESCRIPTION
See hpi Vital Signs Last 24 Hrs  T(C): 36.8 (27 May 2023 00:20), Max: 37.1 (26 May 2023 21:23)  T(F): 98.2 (27 May 2023 00:20), Max: 98.8 (26 May 2023 21:23)  HR: 88 (27 May 2023 00:20) (88 - 99)  BP: 143/92 (27 May 2023 00:20) (118/101 - 143/92)  BP(mean): --  RR: 18 (27 May 2023 00:20) (16 - 18)  SpO2: 100% (27 May 2023 00:20) (100% - 100%)    Parameters below as of 27 May 2023 00:20  Patient On (Oxygen Delivery Method): room air

## 2023-05-27 NOTE — ED BEHAVIORAL HEALTH ASSESSMENT NOTE - NS ED BHA REVIEW OF ED CHART VITAL SIGNS REVIEWED
Date of Surgery Update:  Christopher Alvarez was seen and examined. History and physical has been reviewed. The patient has been examined.  There have been no significant clinical changes since the completion of the originally dated History and Physical.    Signed By: Heath Sims MD     August 27, 2018 7:28 AM Yes

## 2023-05-27 NOTE — ED BEHAVIORAL HEALTH ASSESSMENT NOTE - SUMMARY
Pt is a 32 yo M, single, disabled, non-caregiver, resides at a [facility], with psych h/o moderate ID, ASD, impulse control disorder, factitious disorder, HAVEN, mood disorders, PTSD and ADHD, multiple past psych admissions (last known [facility] 12/19-12/23/21), numerous ED visits for both medical/psych complaints, longstanding h/o SIB (head banging, cutting), but no known SA, longstanding h/o endorsing SI, h/o aggression toward staff at , PMH significant for asthma, DM, urinary retention, GERD, BPH, hypothyroidism, HLD, HTN, and b/l myopia, on Depakote 500 mg BID, Guanfacine 2 mg, Risperdal 4 mg, and Remeron 15 mg, prescribed by his PCP, who presents to the ED for suicidal and homicidal thoughts to kill self and kill  staff.      On assessment, pt presents with concrete thought process.  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  He endorses that he would kick staff members or would stab himself.  Patient is unable to engage in safety planning.  Patient is unable to identify any specific trigger for why these thoughts have surfaced today. He reported that feel like his medications are not working. He endorses depressed mood, his affect is flat and nonreactive.  Denies manic symptoms.   Denies auditory or visual hallucinations, denies paranoid ideations. Denies alcohol/substance use.     Per collateral from group staff they have significant safety concerns, they are advocating for hospitalization, patient will be admitted involuntarily for safety, stabilization, and treatment.    PLAN:   Admit 9.39  Continue Home medications:  Risperdal 4mg BID,   Depakote 500mg BID,   Remeron 15mg QHS,   Buspar 15mg QHS,   Guanfacine 2mg daily in AM,   Pravastatin 20mg daily,   Loratadine 10mg daily,   Levothyroxine 50mcg daily,   Metformin 1000mg BID,   Pantoprazole 20mg daily,   Tamsulosin 0.4mg QHS,   Ferrous Sulfate 325mg daily,   senna 8.6mg 2 tabs QHS.    PRN medications:  - Haldol/Ativan for Agitation  - Albuterol for asthma Pt is a 34 yo M, single, disabled, non-caregiver, resides at a [facility], with psych h/o moderate ID, ASD, impulse control disorder, factitious disorder, HAVEN, mood disorders, PTSD and ADHD, multiple past psych admissions (last known [facility]-S 12/19-12/23/21), numerous ED visits for both medical/psych complaints, longstanding h/o SIB (head banging, cutting), but no known SA, longstanding h/o endorsing SI, h/o aggression toward staff at , PMH significant for asthma, DM, urinary retention, GERD, BPH, hypothyroidism, HLD, HTN, and b/l myopia, on Depakote 500 mg BID, Guanfacine 2 mg, Risperdal 4 mg, and Remeron 15 mg, prescribed by his PCP, who presents to the ED for suicidal and homicidal thoughts to kill self and kill  staff.      On assessment, pt presents with concrete thought process.  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  He endorses that he would kick staff members or would stab himself.  Patient is unable to engage in safety planning.  Patient is unable to identify any specific trigger for why these thoughts have surfaced today. He reported that feel like his medications are not working. He endorses depressed mood, his affect is flat and nonreactive.  Denies manic symptoms.   Denies auditory or visual hallucinations, denies paranoid ideations. Denies alcohol/substance use.     Per collateral from group staff they have significant safety concerns, they are advocating for hospitalization, patient will be admitted involuntarily for safety, stabilization, and treatment.    PLAN:   Admit 9.39  Continue Home medications:  Risperdal 4mg BID,   Depakote 500mg BID,   Remeron 15mg QHS,   Buspar 15mg QHS,   Guanfacine 2mg daily in AM,   Pravastatin 20mg daily,   Loratadine 10mg daily,   Levothyroxine 50mcg daily,   Metformin 1000mg BID,   Pantoprazole 20mg daily,   Tamsulosin 0.4mg QHS,   Ferrous Sulfate 325mg daily,   senna 8.6mg 2 tabs QHS.    PRN medications:  - Thorazine 50mg q6h PO/IM PRN for Agitation  - Albuterol for asthma

## 2023-05-28 PROCEDURE — 93010 ELECTROCARDIOGRAM REPORT: CPT

## 2023-05-28 PROCEDURE — 99231 SBSQ HOSP IP/OBS SF/LOW 25: CPT

## 2023-05-28 RX ADMIN — Medication 15 MILLIGRAM(S): at 21:02

## 2023-05-28 RX ADMIN — METFORMIN HYDROCHLORIDE 1000 MILLIGRAM(S): 850 TABLET ORAL at 16:33

## 2023-05-28 RX ADMIN — DIVALPROEX SODIUM 500 MILLIGRAM(S): 500 TABLET, DELAYED RELEASE ORAL at 20:59

## 2023-05-28 RX ADMIN — DIVALPROEX SODIUM 500 MILLIGRAM(S): 500 TABLET, DELAYED RELEASE ORAL at 09:02

## 2023-05-28 RX ADMIN — RISPERIDONE 4 MILLIGRAM(S): 4 TABLET ORAL at 20:59

## 2023-05-28 RX ADMIN — Medication 15 MILLIGRAM(S): at 08:58

## 2023-05-28 RX ADMIN — SENNA PLUS 2 TABLET(S): 8.6 TABLET ORAL at 20:59

## 2023-05-28 RX ADMIN — TAMSULOSIN HYDROCHLORIDE 0.4 MILLIGRAM(S): 0.4 CAPSULE ORAL at 20:58

## 2023-05-28 RX ADMIN — PANTOPRAZOLE SODIUM 20 MILLIGRAM(S): 20 TABLET, DELAYED RELEASE ORAL at 08:57

## 2023-05-28 RX ADMIN — METFORMIN HYDROCHLORIDE 1000 MILLIGRAM(S): 850 TABLET ORAL at 08:57

## 2023-05-28 RX ADMIN — MIRTAZAPINE 15 MILLIGRAM(S): 45 TABLET, ORALLY DISINTEGRATING ORAL at 20:59

## 2023-05-28 RX ADMIN — Medication 5 MILLIGRAM(S): at 20:59

## 2023-05-28 RX ADMIN — ATORVASTATIN CALCIUM 10 MILLIGRAM(S): 80 TABLET, FILM COATED ORAL at 20:59

## 2023-05-28 RX ADMIN — Medication 50 MILLIGRAM(S): at 18:42

## 2023-05-28 RX ADMIN — Medication 325 MILLIGRAM(S): at 08:57

## 2023-05-28 RX ADMIN — RISPERIDONE 4 MILLIGRAM(S): 4 TABLET ORAL at 08:57

## 2023-05-28 RX ADMIN — LORATADINE 10 MILLIGRAM(S): 10 TABLET ORAL at 08:57

## 2023-05-28 NOTE — BH INPATIENT PSYCHIATRY PROGRESS NOTE - NSBHCHARTREVIEWVS_PSY_A_CORE FT
Vital Signs Last 24 Hrs  T(C): 36.4 (05-28-23 @ 08:04), Max: 36.4 (05-28-23 @ 08:04)  T(F): 97.5 (05-28-23 @ 08:04), Max: 97.5 (05-28-23 @ 08:04)  HR: 91 (05-28-23 @ 08:04) (91 - 91)  BP: 125/91 (05-28-23 @ 08:04) (125/91 - 125/91)  BP(mean): --  RR: --  SpO2: --    Orthostatic VS  05-27-23 @ 20:17  Lying BP: --/-- HR: --  Sitting BP: 140/85 HR: 92  Standing BP: 133/68 HR: 105  Site: upper left arm  Mode: --  Orthostatic VS  05-27-23 @ 03:18  Lying BP: --/-- HR: --  Sitting BP: 134/96 HR: 90  Standing BP: 122/83 HR: 106  Site: --  Mode: --  Orthostatic VS  05-27-23 @ 03:06  Lying BP: --/-- HR: --  Sitting BP: 134/90 HR: 98  Standing BP: 127/89 HR: 90  Site: --  Mode: --

## 2023-05-28 NOTE — BH INPATIENT PSYCHIATRY PROGRESS NOTE - NSBHMETABOLIC_PSY_ALL_CORE_FT
BMI: BMI (kg/m2): 38.4 (05-27-23 @ 03:18)  HbA1c:   Glucose: POCT Blood Glucose.: 130 mg/dL (09-28-22 @ 11:25)    BP: 125/91 (05-28-23 @ 08:04) (118/101 - 143/92)  Lipid Panel:

## 2023-05-29 PROCEDURE — 99231 SBSQ HOSP IP/OBS SF/LOW 25: CPT

## 2023-05-29 RX ADMIN — LORATADINE 10 MILLIGRAM(S): 10 TABLET ORAL at 10:30

## 2023-05-29 RX ADMIN — Medication 15 MILLIGRAM(S): at 20:11

## 2023-05-29 RX ADMIN — Medication 15 MILLIGRAM(S): at 10:29

## 2023-05-29 RX ADMIN — Medication 5 MILLIGRAM(S): at 20:11

## 2023-05-29 RX ADMIN — DIVALPROEX SODIUM 500 MILLIGRAM(S): 500 TABLET, DELAYED RELEASE ORAL at 20:10

## 2023-05-29 RX ADMIN — SENNA PLUS 2 TABLET(S): 8.6 TABLET ORAL at 20:10

## 2023-05-29 RX ADMIN — RISPERIDONE 4 MILLIGRAM(S): 4 TABLET ORAL at 10:30

## 2023-05-29 RX ADMIN — TAMSULOSIN HYDROCHLORIDE 0.4 MILLIGRAM(S): 0.4 CAPSULE ORAL at 20:11

## 2023-05-29 RX ADMIN — RISPERIDONE 4 MILLIGRAM(S): 4 TABLET ORAL at 20:11

## 2023-05-29 RX ADMIN — DIVALPROEX SODIUM 500 MILLIGRAM(S): 500 TABLET, DELAYED RELEASE ORAL at 10:29

## 2023-05-29 RX ADMIN — MIRTAZAPINE 15 MILLIGRAM(S): 45 TABLET, ORALLY DISINTEGRATING ORAL at 20:11

## 2023-05-29 RX ADMIN — Medication 50 MICROGRAM(S): at 06:49

## 2023-05-29 RX ADMIN — METFORMIN HYDROCHLORIDE 1000 MILLIGRAM(S): 850 TABLET ORAL at 16:47

## 2023-05-29 RX ADMIN — METFORMIN HYDROCHLORIDE 1000 MILLIGRAM(S): 850 TABLET ORAL at 10:29

## 2023-05-29 RX ADMIN — Medication 50 MILLIGRAM(S): at 10:29

## 2023-05-29 RX ADMIN — Medication 325 MILLIGRAM(S): at 10:29

## 2023-05-29 RX ADMIN — PANTOPRAZOLE SODIUM 20 MILLIGRAM(S): 20 TABLET, DELAYED RELEASE ORAL at 06:49

## 2023-05-29 RX ADMIN — ATORVASTATIN CALCIUM 10 MILLIGRAM(S): 80 TABLET, FILM COATED ORAL at 20:31

## 2023-05-29 NOTE — BH INPATIENT PSYCHIATRY PROGRESS NOTE - NSBHMETABOLIC_PSY_ALL_CORE_FT
BMI: BMI (kg/m2): 38.4 (05-27-23 @ 03:18)  HbA1c:   Glucose: POCT Blood Glucose.: 130 mg/dL (09-28-22 @ 11:25)    BP: 119/74 (05-28-23 @ 21:17) (118/101 - 143/92)  Lipid Panel:

## 2023-05-29 NOTE — ED PROVIDER NOTE - IV ALTEPLASE INCLUSION HIDDEN
show
Pt with several falls over past month related to unstable gait. Denies head trauma or LOC.   -Likely related to alcohol intoxication vs nutritional deficiency.   -PT consult as below

## 2023-05-29 NOTE — BH INPATIENT PSYCHIATRY PROGRESS NOTE - NSBHCHARTREVIEWVS_PSY_A_CORE FT
Vital Signs Last 24 Hrs  T(C): 36.9 (05-29-23 @ 08:02), Max: 36.9 (05-29-23 @ 08:02)  T(F): 98.4 (05-29-23 @ 08:02), Max: 98.4 (05-29-23 @ 08:02)  HR: --  BP: 119/74 (05-28-23 @ 21:17) (119/74 - 119/74)  BP(mean): 108 (05-28-23 @ 21:17) (108 - 108)  RR: 18 (05-28-23 @ 21:17) (18 - 18)  SpO2: 98% (05-28-23 @ 21:17) (98% - 98%)    Orthostatic VS  05-27-23 @ 20:17  Lying BP: --/-- HR: --  Sitting BP: 140/85 HR: 92  Standing BP: 133/68 HR: 105  Site: upper left arm  Mode: --

## 2023-05-30 PROCEDURE — 99232 SBSQ HOSP IP/OBS MODERATE 35: CPT

## 2023-05-30 RX ORDER — GABAPENTIN 400 MG/1
300 CAPSULE ORAL
Refills: 0 | Status: DISCONTINUED | OUTPATIENT
Start: 2023-05-30 | End: 2023-06-03

## 2023-05-30 RX ADMIN — METFORMIN HYDROCHLORIDE 1000 MILLIGRAM(S): 850 TABLET ORAL at 08:32

## 2023-05-30 RX ADMIN — MIRTAZAPINE 15 MILLIGRAM(S): 45 TABLET, ORALLY DISINTEGRATING ORAL at 19:58

## 2023-05-30 RX ADMIN — Medication 15 MILLIGRAM(S): at 08:31

## 2023-05-30 RX ADMIN — SENNA PLUS 2 TABLET(S): 8.6 TABLET ORAL at 19:58

## 2023-05-30 RX ADMIN — DIVALPROEX SODIUM 500 MILLIGRAM(S): 500 TABLET, DELAYED RELEASE ORAL at 08:31

## 2023-05-30 RX ADMIN — Medication 325 MILLIGRAM(S): at 08:31

## 2023-05-30 RX ADMIN — LORATADINE 10 MILLIGRAM(S): 10 TABLET ORAL at 08:31

## 2023-05-30 RX ADMIN — Medication 10 MILLIGRAM(S): at 19:58

## 2023-05-30 RX ADMIN — DIVALPROEX SODIUM 500 MILLIGRAM(S): 500 TABLET, DELAYED RELEASE ORAL at 19:57

## 2023-05-30 RX ADMIN — Medication 5 MILLIGRAM(S): at 19:59

## 2023-05-30 RX ADMIN — GABAPENTIN 300 MILLIGRAM(S): 400 CAPSULE ORAL at 19:57

## 2023-05-30 RX ADMIN — RISPERIDONE 4 MILLIGRAM(S): 4 TABLET ORAL at 19:57

## 2023-05-30 RX ADMIN — RISPERIDONE 4 MILLIGRAM(S): 4 TABLET ORAL at 08:32

## 2023-05-30 RX ADMIN — ATORVASTATIN CALCIUM 10 MILLIGRAM(S): 80 TABLET, FILM COATED ORAL at 19:58

## 2023-05-30 RX ADMIN — TAMSULOSIN HYDROCHLORIDE 0.4 MILLIGRAM(S): 0.4 CAPSULE ORAL at 19:57

## 2023-05-30 RX ADMIN — METFORMIN HYDROCHLORIDE 1000 MILLIGRAM(S): 850 TABLET ORAL at 16:44

## 2023-05-30 RX ADMIN — PANTOPRAZOLE SODIUM 20 MILLIGRAM(S): 20 TABLET, DELAYED RELEASE ORAL at 07:31

## 2023-05-30 NOTE — BH SOCIAL WORK INITIAL PSYCHOSOCIAL EVALUATION - OTHER PAST PSYCHIATRIC HISTORY (INCLUDE DETAILS REGARDING ONSET, COURSE OF ILLNESS, INPATIENT/OUTPATIENT TREATMENT)
Patient is a 34 yo male domiciled at residence, hx of autism, enrolled w/ [employer]WDD, bib EMS activated by staff. Evening staff reports he gotten agitated and was walking around. When he gets angry/agitated he will punch the walls, has broke his arms in the past and has fought with the  and gets tased. Fredo unsure what exactly the patient did today. He does not believe he hurt anyone today. He is unsure of nay current si or hi but says patient has a hx of homicidal statements often and will say he wants to hurt the workers often. He is unsure of any prior suicide attempts. He reports patient lives in a constant crisis and gets agitated easily. He says patient has many ER vsiits and he is unsure about patient’s last psych hospitalization. He says the patient endorses hearing voices when he is not doing well and suspects he is currently hearing voices. He reports patient is not on a 1 to 1 at the residence. He says the patient can go from “0-100” often. Patient does not attend day program currently and says in the past he would get into fights. He reports medical problems include diabetes. Patient is covid vaccinated and has no recent trave or exposure. He reports patient does not have access to fire arms or weapons. He reports when the patient is not in a good mood he will go to the room and punch a wall or grab a pen. He says the patient will say he needs to go to the hospital when angry and when staff are struggling to redirect him. Fredo reports that he will email over patient’s medication list and states he is compliant w/ medication. Writer agreed to keep him update Patient is a 32 yo male domiciled at residence, hx of autism, enrolled w/ [employer]WDD, BIB EMS activated by staff. Evening staff reports he gotten agitated and was walking around. When he gets angry/agitated he will punch the walls, has broke his arms in the past and has fought with the  and gets tased. Staff unsure what exactly the patient did today. He does not believe he hurt anyone today, unsure of current SI/HI but says patient has a hx of homicidal statements often and will say he wants to hurt the workers. Staff is unsure of any prior suicide attempts patient lives in a constant crisis and gets agitated easily. Staff reported patient has many ER visits and is unsure about patient’s last psych hospitalization. Staff reports the patient endorses hearing voices when not well and suspects he is currently hearing voices. Staff reports patient can go from “0-100”. Patient does not attend day program currently and reported in the past he would get into fights, has medical problems include diabetes. Patient is covid vaccinated and has no recent exposure. Staff reports patient does not have access to fire arms or weapons. Staff reports when the patient is not in a good mood he will go to the room and punch a wall or grab a pen. staff reporters patient will say he needs to go to the hospital when angry or when staff are struggling to redirect him.

## 2023-05-30 NOTE — BH SOCIAL WORK INITIAL PSYCHOSOCIAL EVALUATION - LEGAL HELP
Problem: Mobility Impaired (Adult and Pediatric)  Goal: *Acute Goals and Plan of Care (Insert Text)  Physical Therapy Goals  Initiated 2/20/2018    1. Patient will move from supine to sit and sit to supine , scoot up and down and roll side to side in bed with moderate assistance x 2  within 7 days. 2. Patient will perform sit <> stand with minimal assist x 2  within 7 days. 3. Patient will ambulate with minimal assistance x 2 for 10 feet with RW and assist of 3rd pushing chair from behind within 7 days. 4. Patient will verbalize and demonstrate understanding of spinal precautions (No bending, lifting greater than 5 lbs, or twisting; log-roll technique; frequent repositioning as instructed) within 7 days. physical Therapy TREATMENT  Patient: Rashard Sy (44 y.o. female)  Date: 2/26/2018  Diagnosis: Intra-abdominal free air of unknown etiology [K66.8] Perforated chronic gastric ulcer ([address]  Procedure(s) (LRB):   NASOGASTRIC TUBE PLACEMENT (N/A) 8 Days Post-Op  Precautions: Aspiration, Back, Bed Alarm, Fall, Skin  Chart, physical therapy assessment, plan of care and goals were reviewed. ASSESSMENT:  Pt receive in bed, agreeable to participate with physical therapy. Reports 8/10 R sided pain. Pt very weak in BLE and BUE. Max A x2 for rolling bed mobility with increased time and verbal cues for sequencing. Max A for supine >sitting with HOB elevated. Tolerated sitting EOB x 4min with occasional support and verbal cue to maintain fwd lean. O2 sat 88-89 using 2L, required verbal cues for PLB, recovered to 93%. -120 during activity. Pull to stand using with Mod A x2. Side step toward [address] with Max verbal cues for sequencing. Pt returned to bed with Max A x2 for sit>supine. Encouraged IS and ankle pumps for BLE throughout the day. Three family members present throughout session.    Progression toward goals:  []    Improving appropriately and progressing toward goals  [x]    Improving slowly and progressing toward goals  []    Not making progress toward goals and plan of care will be adjusted     PLAN:  Patient continues to benefit from skilled intervention to address the above impairments. Continue treatment per established plan of care. Discharge Recommendations:  Rehab  Further Equipment Recommendations for Discharge:  TBD     SUBJECTIVE:   Patient stated I don't think I can.     OBJECTIVE DATA SUMMARY:   Critical Behavior:  Neurologic State: Alert, Eyes do not open to any stimulus  Orientation Level: Oriented to person, Disoriented to time, Disoriented to place, Disoriented to situation  Cognition: Appropriate decision making, Appropriate for age attention/concentration, Appropriate safety awareness     Functional Mobility Training:  Bed Mobility:     Supine to Sit: Maximum assistance; Additional time  Sit to Supine: Maximum assistance;Assist x2; Additional time           Transfers:  Sit to Stand: Moderate assistance;Assist x2  Stand to Sit: Moderate assistance;Assist x2                             Balance:  Sitting: High guard; With support; Intact  Standing: Impaired; With support  Standing - Static: Fair;Constant support  Standing - Dynamic : Fair  Ambulation/Gait Training:  Distance (ft): 3 Feet (ft)  Assistive Device: Walker, rolling;Gait belt  Ambulation - Level of Assistance: Moderate assistance;Assist x2        Gait Abnormalities: Antalgic;Decreased step clearance        Base of Support: Widened     Speed/Alejandra: Slow;Shuffled                       Stairs:            Functional Measure:    Neuro Re-Education:    Therapeutic Exercises:     Pain:                    Activity Tolerance:   Fair  Please refer to the flowsheet for vital signs taken during this treatment.   After treatment:   []    Patient left in no apparent distress sitting up in chair  [x]    Patient left in no apparent distress in bed  [x]    Call bell left within reach  [x]    Nursing notified  [x]    Caregiver present  []    Bed alarm activated    COMMUNICATION/COLLABORATION:   The patients plan of care was discussed with: Certified Occupational Therapy Assistant and Registered Nurse    Lit Victoria   Time Calculation: 32 mins no

## 2023-05-30 NOTE — BH INPATIENT PSYCHIATRY PROGRESS NOTE - NSBHMETABOLIC_PSY_ALL_CORE_FT
BMI: BMI (kg/m2): 38.4 (05-27-23 @ 03:18)  HbA1c:   Glucose: POCT Blood Glucose.: 130 mg/dL (09-28-22 @ 11:25)    BP: 144/68 (05-30-23 @ 08:13) (119/74 - 144/68)  Lipid Panel:

## 2023-05-30 NOTE — BH INPATIENT PSYCHIATRY PROGRESS NOTE - NSBHCHARTREVIEWVS_PSY_A_CORE FT
Vital Signs Last 24 Hrs  T(C): 36.6 (05-30-23 @ 08:13), Max: 36.6 (05-30-23 @ 08:13)  T(F): 97.8 (05-30-23 @ 08:13), Max: 97.8 (05-30-23 @ 08:13)  HR: 89 (05-30-23 @ 08:13) (89 - 89)  BP: 144/68 (05-30-23 @ 08:13) (144/68 - 144/68)  BP(mean): --  RR: --  SpO2: --

## 2023-05-30 NOTE — BH SOCIAL WORK INITIAL PSYCHOSOCIAL EVALUATION - NSHIGHRISKBEHFT_PSY_ALL_CORE
When he gets angry/agitated he will punch the walls, has broke his arms in the past and has fought with the  and gets tased.

## 2023-05-31 LAB
A1C WITH ESTIMATED AVERAGE GLUCOSE RESULT: 6.5 % — HIGH (ref 4–5.6)
CHOLEST SERPL-MCNC: 128 MG/DL — SIGNIFICANT CHANGE UP
ESTIMATED AVERAGE GLUCOSE: 140 — SIGNIFICANT CHANGE UP
HDLC SERPL-MCNC: 31 MG/DL — LOW
LIPID PNL WITH DIRECT LDL SERPL: 66 MG/DL — SIGNIFICANT CHANGE UP
NON HDL CHOLESTEROL: 97 MG/DL — SIGNIFICANT CHANGE UP
TRIGL SERPL-MCNC: 157 MG/DL — HIGH
VALPROATE SERPL-MCNC: 84 UG/ML — SIGNIFICANT CHANGE UP (ref 50–100)

## 2023-05-31 PROCEDURE — 99232 SBSQ HOSP IP/OBS MODERATE 35: CPT

## 2023-05-31 RX ORDER — CHLORPROMAZINE HCL 10 MG
75 TABLET ORAL ONCE
Refills: 0 | Status: DISCONTINUED | OUTPATIENT
Start: 2023-05-31 | End: 2023-06-06

## 2023-05-31 RX ORDER — FERROUS SULFATE 325(65) MG
325 TABLET ORAL AT BEDTIME
Refills: 0 | Status: DISCONTINUED | OUTPATIENT
Start: 2023-06-01 | End: 2023-06-06

## 2023-05-31 RX ORDER — CHLORPROMAZINE HCL 10 MG
50 TABLET ORAL EVERY 4 HOURS
Refills: 0 | Status: DISCONTINUED | OUTPATIENT
Start: 2023-05-31 | End: 2023-06-06

## 2023-05-31 RX ADMIN — PANTOPRAZOLE SODIUM 20 MILLIGRAM(S): 20 TABLET, DELAYED RELEASE ORAL at 07:39

## 2023-05-31 RX ADMIN — Medication 50 MILLIGRAM(S): at 18:25

## 2023-05-31 RX ADMIN — METFORMIN HYDROCHLORIDE 1000 MILLIGRAM(S): 850 TABLET ORAL at 17:19

## 2023-05-31 RX ADMIN — GABAPENTIN 300 MILLIGRAM(S): 400 CAPSULE ORAL at 20:24

## 2023-05-31 RX ADMIN — Medication 325 MILLIGRAM(S): at 09:03

## 2023-05-31 RX ADMIN — DIVALPROEX SODIUM 500 MILLIGRAM(S): 500 TABLET, DELAYED RELEASE ORAL at 09:03

## 2023-05-31 RX ADMIN — LORATADINE 10 MILLIGRAM(S): 10 TABLET ORAL at 09:02

## 2023-05-31 RX ADMIN — MIRTAZAPINE 15 MILLIGRAM(S): 45 TABLET, ORALLY DISINTEGRATING ORAL at 20:23

## 2023-05-31 RX ADMIN — Medication 50 MILLIGRAM(S): at 02:07

## 2023-05-31 RX ADMIN — ATORVASTATIN CALCIUM 10 MILLIGRAM(S): 80 TABLET, FILM COATED ORAL at 20:23

## 2023-05-31 RX ADMIN — DIVALPROEX SODIUM 500 MILLIGRAM(S): 500 TABLET, DELAYED RELEASE ORAL at 20:23

## 2023-05-31 RX ADMIN — TAMSULOSIN HYDROCHLORIDE 0.4 MILLIGRAM(S): 0.4 CAPSULE ORAL at 20:23

## 2023-05-31 RX ADMIN — RISPERIDONE 4 MILLIGRAM(S): 4 TABLET ORAL at 09:02

## 2023-05-31 RX ADMIN — RISPERIDONE 4 MILLIGRAM(S): 4 TABLET ORAL at 20:23

## 2023-05-31 RX ADMIN — Medication 10 MILLIGRAM(S): at 09:02

## 2023-05-31 RX ADMIN — SENNA PLUS 2 TABLET(S): 8.6 TABLET ORAL at 20:23

## 2023-05-31 RX ADMIN — GABAPENTIN 300 MILLIGRAM(S): 400 CAPSULE ORAL at 09:02

## 2023-05-31 RX ADMIN — Medication 50 MICROGRAM(S): at 06:32

## 2023-05-31 RX ADMIN — Medication 10 MILLIGRAM(S): at 20:23

## 2023-05-31 RX ADMIN — METFORMIN HYDROCHLORIDE 1000 MILLIGRAM(S): 850 TABLET ORAL at 08:02

## 2023-05-31 RX ADMIN — Medication 5 MILLIGRAM(S): at 20:23

## 2023-05-31 NOTE — BH INPATIENT PSYCHIATRY PROGRESS NOTE - NSBHCHARTREVIEWVS_PSY_A_CORE FT
Vital Signs Last 24 Hrs  T(C): 36.5 (05-30-23 @ 20:34), Max: 36.5 (05-30-23 @ 20:34)  T(F): 97.7 (05-30-23 @ 20:34), Max: 97.7 (05-30-23 @ 20:34)  HR: --  BP: --  BP(mean): --  RR: --  SpO2: --

## 2023-05-31 NOTE — DIETITIAN INITIAL EVALUATION ADULT - OTHER INFO
Pt is a 32 y/o male, resides in group home with h/o Moderate intellectual disability, ASD, Impulse control disorder, Factitious Disorder, HAVEN, Mood disorder, PTSD, ADHD, H/O Aggression toward staff at Group home. Medical history pertinent for Asthma, Type 2 diabetes, Urinary retention, GERD, BPH, Hyperlipidemia, HTN, Hypothyroidism.   Pt presents to ED for suicidal and homicidal thoughts to kill himself and kill [facility] staff.   Saw Pt in dinning area. Reports good appetite/po intake at present. No GI distress noted. Pt not allowed to use knife. Food preferences explored and implement them. Pt declines diet education at this time.

## 2023-05-31 NOTE — DIETITIAN INITIAL EVALUATION ADULT - CONTINUE CURRENT NUTRITION CARE PLAN
----- Message from SONIA Winters sent at 2/9/2023  7:32 AM CST -----  Normal amount of pancreatic enzymes in stool.  Has patient been able to start Xifaxan?   yes

## 2023-05-31 NOTE — DIETITIAN INITIAL EVALUATION ADULT - PERTINENT MEDS FT
MEDICATIONS  (STANDING):  atorvastatin 10 milliGRAM(s) Oral at bedtime  busPIRone 10 milliGRAM(s) Oral two times a day  divalproex  milliGRAM(s) Oral two times a day  ferrous    sulfate 325 milliGRAM(s) Oral daily  gabapentin 300 milliGRAM(s) Oral two times a day  levothyroxine 50 MICROGram(s) Oral daily  loratadine 10 milliGRAM(s) Oral daily  melatonin. 5 milliGRAM(s) Oral at bedtime  metFORMIN 1000 milliGRAM(s) Oral two times a day with meals  mirtazapine 15 milliGRAM(s) Oral at bedtime  pantoprazole    Tablet 20 milliGRAM(s) Oral before breakfast  risperiDONE   Tablet 4 milliGRAM(s) Oral two times a day  senna 2 Tablet(s) Oral at bedtime  tamsulosin 0.4 milliGRAM(s) Oral at bedtime    MEDICATIONS  (PRN):  albuterol    90 MICROgram(s) HFA Inhaler 2 Puff(s) Inhalation every 6 hours PRN Shortness of Breath  bacitracin   Ointment 1 Application(s) Topical three times a day PRN Rash  chlorproMAZINE    Injectable 75 milliGRAM(s) IntraMuscular once PRN agitation  chlorproMAZINE    Tablet 50 milliGRAM(s) Oral every 4 hours PRN agitation

## 2023-05-31 NOTE — BH INPATIENT PSYCHIATRY PROGRESS NOTE - NSBHMETABOLIC_PSY_ALL_CORE_FT
BMI: BMI (kg/m2): 38.4 (05-27-23 @ 03:18)  HbA1c: A1C with Estimated Average Glucose Result: 6.5 % (05-31-23 @ 09:26)    Glucose: POCT Blood Glucose.: 130 mg/dL (09-28-22 @ 11:25)    BP: 144/68 (05-30-23 @ 08:13) (119/74 - 144/68)  Lipid Panel: Date/Time: 05-31-23 @ 09:26  Cholesterol, Serum: 128  Direct LDL: --  HDL Cholesterol, Serum: 31  Total Cholesterol/HDL Ration Measurement: --  Triglycerides, Serum: 157

## 2023-06-01 ENCOUNTER — EMERGENCY (EMERGENCY)
Facility: HOSPITAL | Age: 33
LOS: 1 days | Discharge: HOME | End: 2023-06-01
Attending: STUDENT IN AN ORGANIZED HEALTH CARE EDUCATION/TRAINING PROGRAM | Admitting: STUDENT IN AN ORGANIZED HEALTH CARE EDUCATION/TRAINING PROGRAM
Payer: MEDICAID

## 2023-06-01 VITALS
RESPIRATION RATE: 18 BRPM | SYSTOLIC BLOOD PRESSURE: 125 MMHG | TEMPERATURE: 99 F | OXYGEN SATURATION: 94 % | DIASTOLIC BLOOD PRESSURE: 82 MMHG | HEART RATE: 117 BPM | HEIGHT: 71 IN

## 2023-06-01 PROCEDURE — 99284 EMERGENCY DEPT VISIT MOD MDM: CPT

## 2023-06-01 PROCEDURE — 99232 SBSQ HOSP IP/OBS MODERATE 35: CPT

## 2023-06-01 RX ADMIN — PANTOPRAZOLE SODIUM 20 MILLIGRAM(S): 20 TABLET, DELAYED RELEASE ORAL at 09:01

## 2023-06-01 RX ADMIN — ATORVASTATIN CALCIUM 10 MILLIGRAM(S): 80 TABLET, FILM COATED ORAL at 19:28

## 2023-06-01 RX ADMIN — GABAPENTIN 300 MILLIGRAM(S): 400 CAPSULE ORAL at 09:01

## 2023-06-01 RX ADMIN — RISPERIDONE 4 MILLIGRAM(S): 4 TABLET ORAL at 19:28

## 2023-06-01 RX ADMIN — Medication 5 MILLIGRAM(S): at 19:28

## 2023-06-01 RX ADMIN — METFORMIN HYDROCHLORIDE 1000 MILLIGRAM(S): 850 TABLET ORAL at 09:01

## 2023-06-01 RX ADMIN — RISPERIDONE 4 MILLIGRAM(S): 4 TABLET ORAL at 09:01

## 2023-06-01 RX ADMIN — Medication 50 MILLIGRAM(S): at 19:28

## 2023-06-01 RX ADMIN — Medication 10 MILLIGRAM(S): at 09:01

## 2023-06-01 RX ADMIN — SENNA PLUS 2 TABLET(S): 8.6 TABLET ORAL at 19:29

## 2023-06-01 RX ADMIN — TAMSULOSIN HYDROCHLORIDE 0.4 MILLIGRAM(S): 0.4 CAPSULE ORAL at 19:28

## 2023-06-01 RX ADMIN — DIVALPROEX SODIUM 500 MILLIGRAM(S): 500 TABLET, DELAYED RELEASE ORAL at 19:28

## 2023-06-01 RX ADMIN — MIRTAZAPINE 15 MILLIGRAM(S): 45 TABLET, ORALLY DISINTEGRATING ORAL at 19:28

## 2023-06-01 RX ADMIN — Medication 5 MILLIGRAM(S): at 20:40

## 2023-06-01 RX ADMIN — METFORMIN HYDROCHLORIDE 1000 MILLIGRAM(S): 850 TABLET ORAL at 17:35

## 2023-06-01 RX ADMIN — Medication 325 MILLIGRAM(S): at 20:40

## 2023-06-01 RX ADMIN — DIVALPROEX SODIUM 500 MILLIGRAM(S): 500 TABLET, DELAYED RELEASE ORAL at 09:01

## 2023-06-01 RX ADMIN — LORATADINE 10 MILLIGRAM(S): 10 TABLET ORAL at 09:01

## 2023-06-01 RX ADMIN — GABAPENTIN 300 MILLIGRAM(S): 400 CAPSULE ORAL at 19:29

## 2023-06-01 RX ADMIN — Medication 50 MICROGRAM(S): at 05:58

## 2023-06-01 NOTE — BH TREATMENT PLAN - NSBHPRIMARYDX_PSY_ALL_CORE
MDD (major depressive disorder), recurrent episode    
MDD (major depressive disorder), recurrent episode

## 2023-06-01 NOTE — BH INPATIENT PSYCHIATRY PROGRESS NOTE - NSBHMETABOLIC_PSY_ALL_CORE_FT
BMI: BMI (kg/m2): 38.4 (05-27-23 @ 03:18)  HbA1c: A1C with Estimated Average Glucose Result: 6.5 % (05-31-23 @ 09:26)    Glucose: POCT Blood Glucose.: 130 mg/dL (09-28-22 @ 11:25)    BP: 128/72 (06-01-23 @ 07:36) (128/72 - 144/68)  Lipid Panel: Date/Time: 05-31-23 @ 09:26  Cholesterol, Serum: 128  Direct LDL: --  HDL Cholesterol, Serum: 31  Total Cholesterol/HDL Ration Measurement: --  Triglycerides, Serum: 157

## 2023-06-01 NOTE — CHART NOTE - NSCHARTNOTEFT_GEN_A_CORE
[facility] Inpatient to ED Transfer Summary    Reason for Transfer/Medical Summary:  L Forearm swelling.    33M say he rolled out of bed onto the floor and sustained injury and pain to L forearm. Denies HA, dizziness, fall, LOC, SOB nausea, vomit, abdominal pain, chest pain.        PAST MEDICAL & SURGICAL HISTORY:  Mood disorder      Intellectual disability      Asthma      Obesity      GERD (gastroesophageal reflux disease)      Asthma      GERD (gastroesophageal reflux disease)      Factitious disorder      Urinary retention      Enuresis      Myopia of both eyes      Autism      Hypothyroidism      Hypothyroid      History of BPH      Dyslipidemia      HLD (hyperlipidemia)      No significant past surgical history      No significant past surgical history          Allergies    Bee stings (Unknown)  Haldol (Other)  Zyprexa (Dystonic RXN)  Haldol (Rash)  Zyprexa (Unknown)    Intolerances        MEDICATIONS  (STANDING):  atorvastatin 10 milliGRAM(s) Oral at bedtime  busPIRone 5 milliGRAM(s) Oral two times a day  divalproex  milliGRAM(s) Oral two times a day  ferrous    sulfate 325 milliGRAM(s) Oral at bedtime  gabapentin 300 milliGRAM(s) Oral two times a day  levothyroxine 50 MICROGram(s) Oral daily  loratadine 10 milliGRAM(s) Oral daily  melatonin. 5 milliGRAM(s) Oral at bedtime  metFORMIN 1000 milliGRAM(s) Oral two times a day with meals  mirtazapine 15 milliGRAM(s) Oral at bedtime  pantoprazole    Tablet 20 milliGRAM(s) Oral before breakfast  risperiDONE   Tablet 4 milliGRAM(s) Oral two times a day  senna 2 Tablet(s) Oral at bedtime  tamsulosin 0.4 milliGRAM(s) Oral at bedtime    MEDICATIONS  (PRN):  albuterol    90 MICROgram(s) HFA Inhaler 2 Puff(s) Inhalation every 6 hours PRN Shortness of Breath  bacitracin   Ointment 1 Application(s) Topical three times a day PRN Rash  chlorproMAZINE    Injectable 75 milliGRAM(s) IntraMuscular once PRN agitation  chlorproMAZINE    Tablet 50 milliGRAM(s) Oral every 4 hours PRN agitation      Vital Signs Last 24 Hrs  T(C): 36.2 (01 Jun 2023 07:36), Max: 36.2 (01 Jun 2023 07:36)  T(F): 97.2 (01 Jun 2023 07:36), Max: 97.2 (01 Jun 2023 07:36)  HR: 87 (01 Jun 2023 07:36) (87 - 87)  BP: 128/72 (01 Jun 2023 07:36) (128/72 - 128/72)  BP(mean): --  RR: --   SpO2: --      CAPILLARY BLOOD GLUCOSE            PHYSICAL EXAM:  GENERAL: NAD, well-developed  HEAD:  Atraumatic, Normocephalic  EYES: EOMI, PERRLA, conjunctiva and sclera clear  NECK: Supple, No JVD  CHEST/LUNG: Clear to auscultation bilaterally; No wheeze  HEART: Regular rate and rhythm; No murmurs, rubs, or gallops  ABDOMEN: Soft, Nontender, Nondistended; Bowel sounds present  EXTREMITIES:  L forearm with radial swelling, tenderness on palpation. strength and sensation intact,   PSYCH: Calm   NEUROLOGY: non-focal  SKIN: No rashes or lesions seen on exposed skin.     LABS:        89M admitted to [facility] for mood disorder with L Forearm swelling s/p rolling out of bed on to the L forearm. L Forearm with marked Radial swelling and tenderness. Pt being transferred to the Ed to assess for fracture. A. DAranza N, CAROL, Ed called, Dr Gill notified of incoming pt. Emergency contact Liz Rollins @ [phone] callee and aware of pt transfer.             Psychiatry Section:  Psychiatric Summary/[facility] admitting diagnosis:    Psychiatric Recommendations:    Observation status (check one):   (X) Constant Observation  ( ) Enhanced care  ( ) Routine checks    Risk Status (check all that apply if present):  ( ) at risk for suicide/self-injury  ( ) at risk for aggressive behavior  (X) at risk for elopement  ( ) other risk: [facility] Inpatient to ED Transfer Summary    Reason for Transfer/Medical Summary:  L Forearm swelling.    33M say he rolled out of bed onto the floor and sustained injury and pain to L forearm. Denies HA, dizziness, fall, LOC, SOB nausea, vomit, abdominal pain, chest pain.        PAST MEDICAL & SURGICAL HISTORY:  Mood disorder      Intellectual disability      Asthma      Obesity      GERD (gastroesophageal reflux disease)      Asthma      GERD (gastroesophageal reflux disease)      Factitious disorder      Urinary retention      Enuresis      Myopia of both eyes      Autism      Hypothyroidism      Hypothyroid      History of BPH      Dyslipidemia      HLD (hyperlipidemia)      No significant past surgical history      No significant past surgical history          Allergies    Bee stings (Unknown)  Haldol (Other)  Zyprexa (Dystonic RXN)  Haldol (Rash)  Zyprexa (Unknown)    Intolerances        MEDICATIONS  (STANDING):  atorvastatin 10 milliGRAM(s) Oral at bedtime  busPIRone 5 milliGRAM(s) Oral two times a day  divalproex  milliGRAM(s) Oral two times a day  ferrous    sulfate 325 milliGRAM(s) Oral at bedtime  gabapentin 300 milliGRAM(s) Oral two times a day  levothyroxine 50 MICROGram(s) Oral daily  loratadine 10 milliGRAM(s) Oral daily  melatonin. 5 milliGRAM(s) Oral at bedtime  metFORMIN 1000 milliGRAM(s) Oral two times a day with meals  mirtazapine 15 milliGRAM(s) Oral at bedtime  pantoprazole    Tablet 20 milliGRAM(s) Oral before breakfast  risperiDONE   Tablet 4 milliGRAM(s) Oral two times a day  senna 2 Tablet(s) Oral at bedtime  tamsulosin 0.4 milliGRAM(s) Oral at bedtime    MEDICATIONS  (PRN):  albuterol    90 MICROgram(s) HFA Inhaler 2 Puff(s) Inhalation every 6 hours PRN Shortness of Breath  bacitracin   Ointment 1 Application(s) Topical three times a day PRN Rash  chlorproMAZINE    Injectable 75 milliGRAM(s) IntraMuscular once PRN agitation  chlorproMAZINE    Tablet 50 milliGRAM(s) Oral every 4 hours PRN agitation      Vital Signs Last 24 Hrs  T(C): 36.2 (01 Jun 2023 07:36), Max: 36.2 (01 Jun 2023 07:36)  T(F): 97.2 (01 Jun 2023 07:36), Max: 97.2 (01 Jun 2023 07:36)  HR: 87 (01 Jun 2023 07:36) (87 - 87)  BP: 128/72 (01 Jun 2023 07:36) (128/72 - 128/72)  BP(mean): --  RR: --   SpO2: --      CAPILLARY BLOOD GLUCOSE            PHYSICAL EXAM:  GENERAL: NAD, well-developed  HEAD:  Atraumatic, Normocephalic  EYES: EOMI, PERRLA, conjunctiva and sclera clear  NECK: Supple, No JVD  CHEST/LUNG: Clear to auscultation bilaterally; No wheeze  HEART: Regular rate and rhythm; No murmurs, rubs, or gallops  ABDOMEN: Soft, Nontender, Nondistended; Bowel sounds present  EXTREMITIES:  L forearm with radial swelling, tenderness on palpation. strength and sensation intact,   PSYCH: Calm   NEUROLOGY: non-focal  SKIN: No rashes or lesions seen on exposed skin.     LABS:        33M admitted to [facility] for mood disorder with L Forearm swelling s/p rolling out of bed on to the L forearm. L Forearm with marked Radial swelling and tenderness. Pt being transferred to the Ed to assess for fracture. A. DAranza N, CAROL, Ed called, Dr Gill notified of incoming pt. Emergency contact Liz Rollins @ [phone] callee and aware of pt transfer.             Psychiatry Section:  Psychiatric Summary/[facility] admitting diagnosis:    Psychiatric Recommendations:    Observation status (check one):   (X) Constant Observation  ( ) Enhanced care  ( ) Routine checks    Risk Status (check all that apply if present):  ( ) at risk for suicide/self-injury  ( ) at risk for aggressive behavior  (X) at risk for elopement  ( ) other risk: [facility] Inpatient to ED Transfer Summary    Reason for Transfer/Medical Summary:  L Forearm swelling.    33M say he rolled out of bed onto the floor and sustained injury and pain to L forearm. Denies HA, dizziness, fall, LOC, SOB nausea, vomit, abdominal pain, chest pain.        PAST MEDICAL & SURGICAL HISTORY:  Mood disorder      Intellectual disability      Asthma      Obesity      GERD (gastroesophageal reflux disease)      Asthma      GERD (gastroesophageal reflux disease)      Factitious disorder      Urinary retention      Enuresis      Myopia of both eyes      Autism      Hypothyroidism      Hypothyroid      History of BPH      Dyslipidemia      HLD (hyperlipidemia)      No significant past surgical history      No significant past surgical history          Allergies    Bee stings (Unknown)  Haldol (Other)  Zyprexa (Dystonic RXN)  Haldol (Rash)  Zyprexa (Unknown)    Intolerances        MEDICATIONS  (STANDING):  atorvastatin 10 milliGRAM(s) Oral at bedtime  busPIRone 5 milliGRAM(s) Oral two times a day  divalproex  milliGRAM(s) Oral two times a day  ferrous    sulfate 325 milliGRAM(s) Oral at bedtime  gabapentin 300 milliGRAM(s) Oral two times a day  levothyroxine 50 MICROGram(s) Oral daily  loratadine 10 milliGRAM(s) Oral daily  melatonin. 5 milliGRAM(s) Oral at bedtime  metFORMIN 1000 milliGRAM(s) Oral two times a day with meals  mirtazapine 15 milliGRAM(s) Oral at bedtime  pantoprazole    Tablet 20 milliGRAM(s) Oral before breakfast  risperiDONE   Tablet 4 milliGRAM(s) Oral two times a day  senna 2 Tablet(s) Oral at bedtime  tamsulosin 0.4 milliGRAM(s) Oral at bedtime    MEDICATIONS  (PRN):  albuterol    90 MICROgram(s) HFA Inhaler 2 Puff(s) Inhalation every 6 hours PRN Shortness of Breath  bacitracin   Ointment 1 Application(s) Topical three times a day PRN Rash  chlorproMAZINE    Injectable 75 milliGRAM(s) IntraMuscular once PRN agitation  chlorproMAZINE    Tablet 50 milliGRAM(s) Oral every 4 hours PRN agitation      Vital Signs Last 24 Hrs  T(C): 36.2 (01 Jun 2023 07:36), Max: 36.2 (01 Jun 2023 07:36)  T(F): 97.2 (01 Jun 2023 07:36), Max: 97.2 (01 Jun 2023 07:36)  HR: 87 (01 Jun 2023 07:36) (87 - 87)  BP: 128/72 (01 Jun 2023 07:36) (128/72 - 128/72)  BP(mean): --  RR: --   SpO2: --      CAPILLARY BLOOD GLUCOSE            PHYSICAL EXAM:  GENERAL: NAD, well-developed  HEAD:  Atraumatic, Normocephalic  EYES: EOMI, PERRLA, conjunctiva and sclera clear  NECK: Supple, No JVD  CHEST/LUNG: Clear to auscultation bilaterally; No wheeze  HEART: Regular rate and rhythm; No murmurs, rubs, or gallops  ABDOMEN: Soft, Nontender, Nondistended; Bowel sounds present  EXTREMITIES:  L forearm with radial swelling, tenderness on palpation. strength and sensation intact,   PSYCH: Calm   NEUROLOGY: non-focal  SKIN: No rashes or lesions seen on exposed skin.     LABS:        33M admitted to [facility] for mood disorder with L Forearm swelling s/p rolling out of bed on to the L forearm. L Forearm with marked Radial swelling and tenderness. Pt being transferred to the Ed to assess for fracture. A. DAranza N, CAROL, Ed called, Dr Gill notified of incoming pt. Emergency contact Mrs Bazan Liz @ [phone] called and aware of pt transfer.             Psychiatry Section:  Psychiatric Summary/[facility] admitting diagnosis:    Psychiatric Recommendations:    Observation status (check one):   (X) Constant Observation  ( ) Enhanced care  ( ) Routine checks    Risk Status (check all that apply if present):  ( ) at risk for suicide/self-injury  ( ) at risk for aggressive behavior  (X) at risk for elopement  ( ) other risk:

## 2023-06-01 NOTE — BH TREATMENT PLAN - NSCMSPTSTRENGTHS_PSY_ALL_CORE
Able to adapt/Expressive of emotions/Strong support system/Supportive family
Able to adapt/Strong support system/Supportive family

## 2023-06-01 NOTE — ED ADULT TRIAGE NOTE - CHIEF COMPLAINT QUOTE
left wrist pain with arm in sling from mechanical fall suffered tonight at [facility] on [facility] [address]. No LOC or head trauma reported. Hx of autism, impulse control, DM, bi-polar, ADHD and seizure

## 2023-06-01 NOTE — BH TREATMENT PLAN - NSDCCRITERIA_PSY_ALL_CORE
symptom management, safety planning, care coordination
 symptom management, safety planning, care coordination

## 2023-06-01 NOTE — BH TREATMENT PLAN - NSTXSUICIDGOAL_PSY_ALL_CORE
Will verbalize a decrease in preoccupation with suicidal thoughts and / or intent to commit suicide to 2 on a 10-point scale
Be able to state 3 reasons for living

## 2023-06-01 NOTE — BH TREATMENT PLAN - NSTXPLANTHERAPYSESSIONSFT_PSY_ALL_CORE
05-27-23  Type of therapy: Psychoeducation, Relapse prevention  --  --  --  --  --    05-29-23  Type of therapy: Psychoeducation, Relapse prevention  --  --  --  --  --  
  05-27-23  Type of therapy: Psychoeducation, Relapse prevention  --  --  --  --  --    05-29-23  Type of therapy: Psychoeducation, Relapse prevention  --  --  --  --  --

## 2023-06-01 NOTE — BH INPATIENT PSYCHIATRY PROGRESS NOTE - NSBHCHARTREVIEWVS_PSY_A_CORE FT
Vital Signs Last 24 Hrs  T(C): 36.2 (06-01-23 @ 07:36), Max: 36.4 (05-31-23 @ 19:31)  T(F): 97.2 (06-01-23 @ 07:36), Max: 97.6 (05-31-23 @ 19:31)  HR: 87 (06-01-23 @ 07:36) (87 - 87)  BP: 128/72 (06-01-23 @ 07:36) (128/72 - 128/72)  BP(mean): --  RR: --  SpO2: --

## 2023-06-01 NOTE — BH TREATMENT PLAN - NSTXVIOLNTGOAL_PSY_ALL_CORE
Will decrease the number/duration/intensity of angry/aggressive outbursts
Will be able to express understanding of at least one trigger to their aggressive behavior

## 2023-06-01 NOTE — BH TREATMENT PLAN - NSTXCOPEINTERPR_PSY_ALL_CORE
Over the next 7 days, psychiatric rehabilitation staff recommend the patient engage in both individual and group counseling sessions in efforts to identify coping methods and facilitate progress towards his treatment goal.
Over the next 7 days, psychiatric rehabilitation staff recommend the patient engage in both individual and group counseling sessions in efforts to identify coping methods and facilitate progress towards his treatment goal.

## 2023-06-02 VITALS
RESPIRATION RATE: 18 BRPM | DIASTOLIC BLOOD PRESSURE: 67 MMHG | HEART RATE: 97 BPM | TEMPERATURE: 98 F | OXYGEN SATURATION: 96 % | SYSTOLIC BLOOD PRESSURE: 113 MMHG

## 2023-06-02 PROCEDURE — 73090 X-RAY EXAM OF FOREARM: CPT | Mod: 26,LT

## 2023-06-02 PROCEDURE — 99232 SBSQ HOSP IP/OBS MODERATE 35: CPT

## 2023-06-02 PROCEDURE — 73110 X-RAY EXAM OF WRIST: CPT | Mod: 26,LT

## 2023-06-02 RX ORDER — ACETAMINOPHEN 500 MG
975 TABLET ORAL ONCE
Refills: 0 | Status: COMPLETED | OUTPATIENT
Start: 2023-06-02 | End: 2023-06-02

## 2023-06-02 RX ORDER — IBUPROFEN 200 MG
400 TABLET ORAL EVERY 6 HOURS
Refills: 0 | Status: DISCONTINUED | OUTPATIENT
Start: 2023-06-02 | End: 2023-06-06

## 2023-06-02 RX ORDER — ACETAMINOPHEN 500 MG
650 TABLET ORAL EVERY 6 HOURS
Refills: 0 | Status: DISCONTINUED | OUTPATIENT
Start: 2023-06-02 | End: 2023-06-06

## 2023-06-02 RX ORDER — CHLORPROMAZINE HCL 10 MG
75 TABLET ORAL ONCE
Refills: 0 | Status: COMPLETED | OUTPATIENT
Start: 2023-06-02 | End: 2023-06-02

## 2023-06-02 RX ADMIN — TAMSULOSIN HYDROCHLORIDE 0.4 MILLIGRAM(S): 0.4 CAPSULE ORAL at 20:44

## 2023-06-02 RX ADMIN — SENNA PLUS 2 TABLET(S): 8.6 TABLET ORAL at 20:43

## 2023-06-02 RX ADMIN — Medication 50 MICROGRAM(S): at 05:51

## 2023-06-02 RX ADMIN — DIVALPROEX SODIUM 500 MILLIGRAM(S): 500 TABLET, DELAYED RELEASE ORAL at 19:56

## 2023-06-02 RX ADMIN — Medication 75 MILLIGRAM(S): at 20:15

## 2023-06-02 RX ADMIN — Medication 50 MILLIGRAM(S): at 17:52

## 2023-06-02 RX ADMIN — RISPERIDONE 4 MILLIGRAM(S): 4 TABLET ORAL at 08:48

## 2023-06-02 RX ADMIN — Medication 50 MILLIGRAM(S): at 10:14

## 2023-06-02 RX ADMIN — METFORMIN HYDROCHLORIDE 1000 MILLIGRAM(S): 850 TABLET ORAL at 17:52

## 2023-06-02 RX ADMIN — LORATADINE 10 MILLIGRAM(S): 10 TABLET ORAL at 08:48

## 2023-06-02 RX ADMIN — GABAPENTIN 300 MILLIGRAM(S): 400 CAPSULE ORAL at 20:43

## 2023-06-02 RX ADMIN — Medication 975 MILLIGRAM(S): at 01:28

## 2023-06-02 RX ADMIN — METFORMIN HYDROCHLORIDE 1000 MILLIGRAM(S): 850 TABLET ORAL at 08:48

## 2023-06-02 RX ADMIN — GABAPENTIN 300 MILLIGRAM(S): 400 CAPSULE ORAL at 08:48

## 2023-06-02 RX ADMIN — Medication 5 MILLIGRAM(S): at 20:08

## 2023-06-02 RX ADMIN — Medication 400 MILLIGRAM(S): at 10:14

## 2023-06-02 RX ADMIN — RISPERIDONE 4 MILLIGRAM(S): 4 TABLET ORAL at 20:43

## 2023-06-02 RX ADMIN — MIRTAZAPINE 15 MILLIGRAM(S): 45 TABLET, ORALLY DISINTEGRATING ORAL at 19:55

## 2023-06-02 RX ADMIN — DIVALPROEX SODIUM 500 MILLIGRAM(S): 500 TABLET, DELAYED RELEASE ORAL at 08:49

## 2023-06-02 RX ADMIN — Medication 5 MILLIGRAM(S): at 08:48

## 2023-06-02 RX ADMIN — Medication 5 MILLIGRAM(S): at 20:43

## 2023-06-02 RX ADMIN — ATORVASTATIN CALCIUM 10 MILLIGRAM(S): 80 TABLET, FILM COATED ORAL at 20:43

## 2023-06-02 RX ADMIN — Medication 400 MILLIGRAM(S): at 17:53

## 2023-06-02 RX ADMIN — Medication 400 MILLIGRAM(S): at 12:36

## 2023-06-02 NOTE — ED ADULT NURSE NOTE - OBJECTIVE STATEMENT
Mynor RN. PAtient received in spot 26a. A&O3. Past medical history of autism, bipolar, ADHD, DM, seizure. Patient came from [place] [address]. Aide at bedside. Respirations even and unlabored. Came into ED with complaints of Left hand pain. States falling and hitting his wrist. Left wrist is swollen. Denies SOB/CP, N/V/D. Denies LOC or head strike. Report to be given to primary RN. Comfort and safety maintained. Stretcher in lowest position.

## 2023-06-02 NOTE — PROVIDER CONTACT NOTE (OTHER) - BACKGROUND
As per MD, pt to return to [facility] [address]. MD provided with CAROL page # for handoff. Writer arranged EMS transport for pt with [facility] #[phone] with trip #110A. Writer spoke with Catarina. As per MD, pt to return to [facility] [address]. MD provided with CAROL page # for handoff. Writer arranged EMS transport for pt with [facility] #[phone] with trip #110A.

## 2023-06-02 NOTE — BH INPATIENT PSYCHIATRY PROGRESS NOTE - NSBHMETABOLIC_PSY_ALL_CORE_FT
BMI: BMI (kg/m2): 38.4 (06-01-23 @ 23:35)  HbA1c: A1C with Estimated Average Glucose Result: 6.5 % (05-31-23 @ 09:26)    Glucose: POCT Blood Glucose.: 136 mg/dL (06-02-23 @ 02:09)    BP: 132/70 (06-02-23 @ 04:45) (128/72 - 132/70)  Lipid Panel: Date/Time: 05-31-23 @ 09:26  Cholesterol, Serum: 128  Direct LDL: --  HDL Cholesterol, Serum: 31  Total Cholesterol/HDL Ration Measurement: --  Triglycerides, Serum: 157

## 2023-06-02 NOTE — ED PROVIDER NOTE - PROGRESS NOTE DETAILS
Desmond PGY2: Xray negative for any fracture. Patient's forearm was wrapped with ACE wrap, patient signed out to [facility] resident. Patient will be discharged.

## 2023-06-02 NOTE — ED PROVIDER NOTE - ATTENDING CONTRIBUTION TO CARE
33 y M, tx from [facility], for L forearm injury, swelling, pain after an unwitnessed fall from bed.  otherwise VSWNL on arrival. Denies head trauma, LOC. No chest pain, shortness of breath, abd pain, n/v. Otherwise in his baseline health. States he was rocking in his bed, fell to the ground and broke his fall with RT hand. mild L distal forearm swelling noted, tender to touch, no increased warmth noted. Mild erythema. pulses intact. equal  strength. sensation intact. FROM in wrist, fingers, elbow. No ttp of snuff box. Will get Xrays of L forearm, L wrist to r/o fx. XR negative. Pain controlled. Apply acewrap.

## 2023-06-02 NOTE — ED PROVIDER NOTE - PATIENT PORTAL LINK FT
You can access the FollowMyHealth Patient Portal offered by [facility] by registering at the following website: [email or web address]. By joining [facility]’s FollowMyHealth portal, you will also be able to view your health information using other applications (apps) compatible with our system.

## 2023-06-02 NOTE — ED PROVIDER NOTE - OBJECTIVE STATEMENT
33 y M, R-hand dominant, hx of autism, impulse control, DM, bi-polar, ADHD and seizure, transfer from [facility] for 1-day onset of L forearm swelling/pain after an unwitnessed fall. Per patient, he rolled out of his bed. Denies head injury, LOC, headache, chest pain, shortness of breath, abdominal pain. Reports pain 9/10.

## 2023-06-02 NOTE — ED PROVIDER NOTE - NS ED ROS FT
Constitutional:  (-) fever, (-) chills, (-) lethargy  Eyes:  (-) eye pain (-) visual changes  ENMT: (-) nasal discharge, (-) sore throat. (-) neck pain or stiffness  Cardiac: (-) chest pain (-) palpitations  Respiratory:  (-) cough (-) respiratory distress.   GI:  (-) nausea (-) vomiting (-) diarrhea (-) abdominal pain.  :  (-) dysuria (-) frequency (-) burning.  MS:  (-) back pain (+) joint pain - L forearm pain  Neuro:  (-) headache (-) numbness (-) tingling (-) focal weakness  Skin:  (-) rash  Except as documented in the HPI,  all other systems are negative

## 2023-06-02 NOTE — PHARMACOTHERAPY INTERVENTION NOTE - COMMENTS
In monitoring patients on levothyroxine for drug-drug interactions and timing of administration, identified patient with an order for ferrous sulfate scheduled for daily at 0900. Levothyroxine defaults to daily at 0600. Concurrent use of levothyroxine and iron-, aluminum-, calcium- or magnesium-containing products may result in decreased levothyroxine absorption and must be  from levothyroxine by at least 4 hours. Therefore, recommended moving iron supplementation to bedtime, starting tomorrow 6/1/23 as patient already received this morning’s dose, to avoid the interaction.    Christiana Rosa, PharmD, BCPP  
Concurrent administration of levothyroxine and other medications within 1 hour may result in decreased levothyroxine effectiveness.    Since levothyroxine administration time defaults to 0600 and pantoprazole administration time defaults to 0730, there is the potential for the medications to be administered together. Added administration instruction to pantoprazole order to "Administer at least 1 hour AFTER levothyroxine."    Christiana Rosa, GayatriD, BCPP

## 2023-06-02 NOTE — BH INPATIENT PSYCHIATRY PROGRESS NOTE - NSBHCHARTREVIEWVS_PSY_A_CORE FT
Vital Signs Last 24 Hrs  T(C): 36.7 (06-02-23 @ 04:45), Max: 37 (06-01-23 @ 23:35)  T(F): 98 (06-02-23 @ 04:45), Max: 98.6 (06-01-23 @ 23:35)  HR: 87 (06-02-23 @ 04:45) (87 - 117)  BP: 132/70 (06-02-23 @ 04:45) (113/67 - 132/70)  BP(mean): --  RR: 18 (06-02-23 @ 02:22) (18 - 18)  SpO2: 96% (06-02-23 @ 02:22) (94% - 96%)    Orthostatic VS  06-01-23 @ 20:25  Lying BP: --/-- HR: --  Sitting BP: 130/75 HR: 86  Standing BP: 127/73 HR: 88  Site: --  Mode: --

## 2023-06-02 NOTE — ED PROVIDER NOTE - NSFOLLOWUPINSTRUCTIONS_ED_ALL_ED_FT
You may take Tylenol 1000 mg every 8 hours (no more than 4000 mg per 24 hours) as needed for pain.   You may take Ibuprofen 400 mg every 6 hours as needed for pain.     Please follow up with your Primary Medical Doctor within the next 7 days to monitor your symptoms.     Please come back to the Emergency Room if your symptoms get worse. Your Xray was negative for any fracture.     You may take Tylenol 1000 mg every 8 hours (no more than 4000 mg per 24 hours) as needed for pain.   You may take Ibuprofen 400 mg every 6 hours as needed for pain.     Please follow up with your Primary Medical Doctor within the next 7 days to monitor your symptoms.     Please come back to the Emergency Room if your symptoms get worse.

## 2023-06-02 NOTE — ED PROVIDER NOTE - CLINICAL SUMMARY MEDICAL DECISION MAKING FREE TEXT BOX
33 y M, tx from [facility], for L forearm injury, swelling, pain after an unwitnessed fall from bed.  otherwise VSWNL on arrival. PE as noted above. L distal forearm swelling noted, tender to touch, mildly erythematous. Will get Xrays of L forearm, L wrist to r/o fx. Manage pain, reassess.

## 2023-06-02 NOTE — ED PROVIDER NOTE - PHYSICAL EXAMINATION
Gen: Patient is well-appearing, NAD, AAOx3, able to ambulate without assistance  HEENT: NCAT, EOMI, PERRLA, normal conjunctiva, tongue midline, oral mucosa moist  Lung: CTAB, no respiratory distress, no wheezes/rhonchi/rales B/L, speaking in full sentences  CV: RRR, no murmurs, rubs or gallops, distal pulses 2+ b/l  Abd: soft, NT, ND, no guarding, no rigidity, no rebound tenderness, no CVA tenderness   MSK: L distal forearm swelling noted, mildly erythematous, ROM normal in UE/LE, no back TTP  Neuro: No focal sensory or motor deficits  Skin: Warm, well perfused, L distal forearm swelling, no leg swelling  Psych: normal affect, calm

## 2023-06-02 NOTE — ED ADULT NURSE NOTE - CHIEF COMPLAINT QUOTE
left wrist pain with arm in sling from mechanical fall suffered tonight at [facility] on Low 6. No LOC or head trauma reported. Hx of autism, impulse control, DM, bi-polar, ADHD and seizure

## 2023-06-02 NOTE — BH CHART NOTE - NSEVENTNOTEFT_PSY_ALL_CORE
CAROL called for evaluation of skin rash. Pt reports itching in the right inner thigh. On evaluation, rash with excoriation and redness seen on right inner thigh. Patient reports that he first developed the rash prior to hospitalization. He reveals that he hasn't been showering. Pt denies chest pain, SOB, or edema. Denies headache, visual changes, confusion, or n/v.       T(C): 36.7 (05-27-23 @ 03:18), Max: 37.1 (05-26-23 @ 21:23)  HR: 88 (05-27-23 @ 00:20) (88 - 99)  BP: 143/92 (05-27-23 @ 00:20) (118/101 - 143/92)  RR: 17 (05-27-23 @ 03:18) (16 - 18)  SpO2: 100% (05-27-23 @ 00:20) (100% - 100%)    Physical Exam:  Gen: Excoriation and redness seen on right inner thigh.   HEENT: NC/AT,  EOMI.    Resp: CTA b/l. No wheezes, ronchi, or crackles.   CV: Radial pulses 2+ b/l, RRR, no murmurs.   Abd: soft, NTND, no guarding or rigidity. NABS.    Ext: ROM intact, no clubbing, edema, or cyanosis.   Neuro: awake, alert, grossly oriented.     Assessment:  CAROL called for evaluation of skin rash. Patient counseled on taking regular showers.     Plan:  1. PRN bacitracin  2. will continue to monitor routinely.   3. d/w RN staff 
CAROL called to evaluate patient for suicidality and self-harm. Of note, patient just arrived from [facility] ED and carries dx of Autism Spectrum D/O as well as moderate intellectual disability. Upon evaluation, patient appeared anxious with obvious psychomotor agitation. Patient endorsed feeling suicidal and strong self-harm urges. He stated that he did not feel safe currently being alone on the unit and felt like he would want to hurt himself. He denied any HI. Patient also endorsed AH, but did not wish to further disclose more information. Patient stated that he was also placed on 1:1 CO in his group home for about one month due to self-injurious behavior and request 1:1 CO at this time. 1:1 CO ordered for self-harm and suicidality. Primary Team to evaluate need for continued CO.
[facility] Inpatient to ED Transfer Summary    Reason for Transfer/Medical Summary: Pa      PAST MEDICAL & SURGICAL HISTORY:  Mood disorder  Intellectual disability  Asthma  Obesity  GERD (gastroesophageal reflux disease)  Asthma  GERD (gastroesophageal reflux disease)  Factitious disorder  Urinary retention  Enuresis  Myopia of both eyes  Autism  Hypothyroidism  Hypothyroid  History of BPH  Dyslipidemia  HLD (hyperlipidemia)    No significant past surgical history  No significant past surgical history    Allergies    Bee stings (Unknown)  Haldol (Other)  Zyprexa (Dystonic RXN)  Haldol (Rash)  Zyprexa (Unknown)    Intolerances    MEDICATIONS  (STANDING):  atorvastatin 10 milliGRAM(s) Oral at bedtime  busPIRone 5 milliGRAM(s) Oral two times a day  divalproex  milliGRAM(s) Oral two times a day  ferrous    sulfate 325 milliGRAM(s) Oral at bedtime  gabapentin 300 milliGRAM(s) Oral two times a day  levothyroxine 50 MICROGram(s) Oral daily  loratadine 10 milliGRAM(s) Oral daily  melatonin. 5 milliGRAM(s) Oral at bedtime  metFORMIN 1000 milliGRAM(s) Oral two times a day with meals  mirtazapine 15 milliGRAM(s) Oral at bedtime  pantoprazole    Tablet 20 milliGRAM(s) Oral before breakfast  risperiDONE   Tablet 4 milliGRAM(s) Oral two times a day  senna 2 Tablet(s) Oral at bedtime  tamsulosin 0.4 milliGRAM(s) Oral at bedtime    MEDICATIONS  (PRN):  albuterol    90 MICROgram(s) HFA Inhaler 2 Puff(s) Inhalation every 6 hours PRN Shortness of Breath  bacitracin   Ointment 1 Application(s) Topical three times a day PRN Rash  chlorproMAZINE    Injectable 75 milliGRAM(s) IntraMuscular once PRN agitation  chlorproMAZINE    Tablet 50 milliGRAM(s) Oral every 4 hours PRN agitation    Vital Signs Last 24 Hrs  T(C): 36.2 (01 Jun 2023 07:36), Max: 36.2 (01 Jun 2023 07:36)  T(F): 97.2 (01 Jun 2023 07:36), Max: 97.2 (01 Jun 2023 07:36)  HR: 87 (01 Jun 2023 07:36) (87 - 87)  BP: 128/72 (01 Jun 2023 07:36) (128/72 - 128/72)  BP(mean): --  RR: --  SpO2: --    CAPILLARY BLOOD GLUCOSE    PHYSICAL EXAM:  GENERAL: NAD, well-developed  HEAD:  Atraumatic, Normocephalic  EXTREMITIES:  2+ Peripheral Pulses, both hands warm to touch. Large swelling in distal aspect of L forearm, tender, bruised.   PSYCH: Alert, oriented to situation  NEUROLOGY: non-focal.  intact but limited by pain  SKIN: No rashes or lesions. + bruising over distal forearm    LABS:    Psychiatry Section:  Psychiatric Summary/[facility] admitting diagnosis:    Psychiatric Recommendations:    Observation status (check one):   (X) Constant Observation  ( ) Enhanced care  ( ) Routine checks    Risk Status (check all that apply if present):  ( ) at risk for suicide/self-injury  ( ) at risk for aggressive behavior  (X) at risk for elopement  ( ) other risk:   
CAROL called for activation of IM medication due to patient agitation. Psych emergency called. Per staff patient was hitting himself and attempt to attack staff.  He was unable to be redirected, refusing PO prn medications.Thorazine 75mg IM activated for threat to self and others. Patient seen after IM administered. Patient was aggressive during administration, voicing racial slurs and threatening to shoot staff with a gun. After IM administration, patient continued to make threatening statements, though remained seated and did not make further advances toward staff. Advised RN staff to notify CAROL if patient continues to be agitated. 
Pt transferred to ED after unwitnessed fall onto outstretched arm. Pt with swelling and pain at left distal forearm. Neurovascularly intact. Sent for imaging. Pt's mother was updated by CAROL.

## 2023-06-03 PROCEDURE — 99232 SBSQ HOSP IP/OBS MODERATE 35: CPT

## 2023-06-03 RX ORDER — GABAPENTIN 400 MG/1
400 CAPSULE ORAL
Refills: 0 | Status: DISCONTINUED | OUTPATIENT
Start: 2023-06-03 | End: 2023-06-06

## 2023-06-03 RX ADMIN — METFORMIN HYDROCHLORIDE 1000 MILLIGRAM(S): 850 TABLET ORAL at 10:35

## 2023-06-03 RX ADMIN — Medication 650 MILLIGRAM(S): at 21:40

## 2023-06-03 RX ADMIN — DIVALPROEX SODIUM 500 MILLIGRAM(S): 500 TABLET, DELAYED RELEASE ORAL at 20:13

## 2023-06-03 RX ADMIN — Medication 325 MILLIGRAM(S): at 20:13

## 2023-06-03 RX ADMIN — DIVALPROEX SODIUM 500 MILLIGRAM(S): 500 TABLET, DELAYED RELEASE ORAL at 10:36

## 2023-06-03 RX ADMIN — Medication 50 MILLIGRAM(S): at 10:37

## 2023-06-03 RX ADMIN — Medication 5 MILLIGRAM(S): at 10:35

## 2023-06-03 RX ADMIN — ATORVASTATIN CALCIUM 10 MILLIGRAM(S): 80 TABLET, FILM COATED ORAL at 20:14

## 2023-06-03 RX ADMIN — GABAPENTIN 300 MILLIGRAM(S): 400 CAPSULE ORAL at 10:36

## 2023-06-03 RX ADMIN — TAMSULOSIN HYDROCHLORIDE 0.4 MILLIGRAM(S): 0.4 CAPSULE ORAL at 20:12

## 2023-06-03 RX ADMIN — Medication 5 MILLIGRAM(S): at 20:13

## 2023-06-03 RX ADMIN — GABAPENTIN 400 MILLIGRAM(S): 400 CAPSULE ORAL at 20:13

## 2023-06-03 RX ADMIN — MIRTAZAPINE 15 MILLIGRAM(S): 45 TABLET, ORALLY DISINTEGRATING ORAL at 20:13

## 2023-06-03 RX ADMIN — RISPERIDONE 4 MILLIGRAM(S): 4 TABLET ORAL at 20:13

## 2023-06-03 RX ADMIN — SENNA PLUS 2 TABLET(S): 8.6 TABLET ORAL at 20:13

## 2023-06-03 RX ADMIN — Medication 650 MILLIGRAM(S): at 20:20

## 2023-06-03 RX ADMIN — RISPERIDONE 4 MILLIGRAM(S): 4 TABLET ORAL at 10:36

## 2023-06-03 NOTE — BH INPATIENT PSYCHIATRY PROGRESS NOTE - NSBHMETABOLIC_PSY_ALL_CORE_FT
BMI: BMI (kg/m2): 38.4 (06-01-23 @ 23:35)  HbA1c: A1C with Estimated Average Glucose Result: 6.5 % (05-31-23 @ 09:26)    Glucose: POCT Blood Glucose.: 136 mg/dL (06-02-23 @ 02:09)    BP: 100/70 (06-03-23 @ 06:44) (100/70 - 132/70)  Lipid Panel: Date/Time: 05-31-23 @ 09:26  Cholesterol, Serum: 128  Direct LDL: --  HDL Cholesterol, Serum: 31  Total Cholesterol/HDL Ration Measurement: --  Triglycerides, Serum: 157   BMI: BMI (kg/m2): 38.4 (06-01-23 @ 23:35)  HbA1c: A1C with Estimated Average Glucose Result: 6.5 % (05-31-23 @ 09:26)    Glucose: POCT Blood Glucose.: 136 mg/dL (06-02-23 @ 02:09)    BP: 115/76 (06-05-23 @ 06:36) (100/70 - 115/76)  Lipid Panel: Date/Time: 05-31-23 @ 09:26  Cholesterol, Serum: 128  Direct LDL: --  HDL Cholesterol, Serum: 31  Total Cholesterol/HDL Ration Measurement: --  Triglycerides, Serum: 157

## 2023-06-03 NOTE — BH INPATIENT PSYCHIATRY PROGRESS NOTE - NSBHCHARTREVIEWVS_PSY_A_CORE FT
Vital Signs Last 24 Hrs  T(C): 36 (06-03-23 @ 06:44), Max: 36.5 (06-02-23 @ 20:30)  T(F): 96.8 (06-03-23 @ 06:44), Max: 97.7 (06-02-23 @ 20:30)  HR: 90 (06-03-23 @ 06:44) (90 - 90)  BP: 100/70 (06-03-23 @ 06:44) (100/70 - 100/70)  BP(mean): --  RR: --  SpO2: --    Orthostatic VS  06-01-23 @ 20:25  Lying BP: --/-- HR: --  Sitting BP: 130/75 HR: 86  Standing BP: 127/73 HR: 88  Site: --  Mode: --   Vital Signs Last 24 Hrs  T(C): --  T(F): --  HR: 98 (06-05-23 @ 06:36) (98 - 98)  BP: 115/76 (06-05-23 @ 06:36) (115/76 - 115/76)  BP(mean): --  RR: --  SpO2: --

## 2023-06-04 PROCEDURE — 99232 SBSQ HOSP IP/OBS MODERATE 35: CPT

## 2023-06-04 RX ADMIN — GABAPENTIN 400 MILLIGRAM(S): 400 CAPSULE ORAL at 20:06

## 2023-06-04 RX ADMIN — Medication 325 MILLIGRAM(S): at 20:05

## 2023-06-04 RX ADMIN — Medication 50 MILLIGRAM(S): at 20:04

## 2023-06-04 RX ADMIN — RISPERIDONE 4 MILLIGRAM(S): 4 TABLET ORAL at 08:34

## 2023-06-04 RX ADMIN — PANTOPRAZOLE SODIUM 20 MILLIGRAM(S): 20 TABLET, DELAYED RELEASE ORAL at 08:35

## 2023-06-04 RX ADMIN — LORATADINE 10 MILLIGRAM(S): 10 TABLET ORAL at 09:24

## 2023-06-04 RX ADMIN — Medication 50 MICROGRAM(S): at 05:32

## 2023-06-04 RX ADMIN — ATORVASTATIN CALCIUM 10 MILLIGRAM(S): 80 TABLET, FILM COATED ORAL at 20:05

## 2023-06-04 RX ADMIN — RISPERIDONE 4 MILLIGRAM(S): 4 TABLET ORAL at 20:04

## 2023-06-04 RX ADMIN — TAMSULOSIN HYDROCHLORIDE 0.4 MILLIGRAM(S): 0.4 CAPSULE ORAL at 20:06

## 2023-06-04 RX ADMIN — DIVALPROEX SODIUM 500 MILLIGRAM(S): 500 TABLET, DELAYED RELEASE ORAL at 20:05

## 2023-06-04 RX ADMIN — Medication 650 MILLIGRAM(S): at 17:49

## 2023-06-04 RX ADMIN — Medication 650 MILLIGRAM(S): at 17:20

## 2023-06-04 RX ADMIN — MIRTAZAPINE 15 MILLIGRAM(S): 45 TABLET, ORALLY DISINTEGRATING ORAL at 20:04

## 2023-06-04 RX ADMIN — METFORMIN HYDROCHLORIDE 1000 MILLIGRAM(S): 850 TABLET ORAL at 08:34

## 2023-06-04 RX ADMIN — GABAPENTIN 400 MILLIGRAM(S): 400 CAPSULE ORAL at 08:35

## 2023-06-04 RX ADMIN — METFORMIN HYDROCHLORIDE 1000 MILLIGRAM(S): 850 TABLET ORAL at 16:58

## 2023-06-04 RX ADMIN — DIVALPROEX SODIUM 500 MILLIGRAM(S): 500 TABLET, DELAYED RELEASE ORAL at 08:35

## 2023-06-04 RX ADMIN — Medication 5 MILLIGRAM(S): at 20:04

## 2023-06-04 NOTE — BH INPATIENT PSYCHIATRY PROGRESS NOTE - NSBHCHARTREVIEWVS_PSY_A_CORE FT
Vital Signs Last 24 Hrs  T(C): --  T(F): --  HR: --  BP: --  BP(mean): --  RR: --  SpO2: --     Vital Signs Last 24 Hrs  T(C): --  T(F): --  HR: 98 (06-05-23 @ 06:36) (98 - 98)  BP: 115/76 (06-05-23 @ 06:36) (115/76 - 115/76)  BP(mean): --  RR: --  SpO2: --

## 2023-06-05 PROCEDURE — 99239 HOSP IP/OBS DSCHRG MGMT >30: CPT

## 2023-06-05 RX ORDER — RISPERIDONE 4 MG/1
1 TABLET ORAL
Qty: 0 | Refills: 0 | DISCHARGE

## 2023-06-05 RX ORDER — LORATADINE 10 MG/1
1 TABLET ORAL
Qty: 0 | Refills: 0 | DISCHARGE
Start: 2023-06-05

## 2023-06-05 RX ORDER — PANTOPRAZOLE SODIUM 20 MG/1
1 TABLET, DELAYED RELEASE ORAL
Qty: 0 | Refills: 0 | DISCHARGE

## 2023-06-05 RX ORDER — DIVALPROEX SODIUM 500 MG/1
1 TABLET, DELAYED RELEASE ORAL
Qty: 60 | Refills: 0
Start: 2023-06-05 | End: 2023-07-04

## 2023-06-05 RX ORDER — MIRTAZAPINE 45 MG/1
1 TABLET, ORALLY DISINTEGRATING ORAL
Qty: 30 | Refills: 0
Start: 2023-06-05 | End: 2023-07-04

## 2023-06-05 RX ORDER — DIVALPROEX SODIUM 500 MG/1
1 TABLET, DELAYED RELEASE ORAL
Qty: 0 | Refills: 0 | DISCHARGE

## 2023-06-05 RX ORDER — SENNA PLUS 8.6 MG/1
2 TABLET ORAL
Qty: 60 | Refills: 0
Start: 2023-06-05 | End: 2023-07-04

## 2023-06-05 RX ORDER — TAMSULOSIN HYDROCHLORIDE 0.4 MG/1
1 CAPSULE ORAL
Qty: 30 | Refills: 0
Start: 2023-06-05 | End: 2023-07-04

## 2023-06-05 RX ORDER — GUANFACINE 3 MG/1
1 TABLET, EXTENDED RELEASE ORAL
Qty: 0 | Refills: 0 | DISCHARGE

## 2023-06-05 RX ORDER — FERROUS SULFATE 325(65) MG
1 TABLET ORAL
Qty: 30 | Refills: 0
Start: 2023-06-05 | End: 2023-07-04

## 2023-06-05 RX ORDER — LEVOTHYROXINE SODIUM 125 MCG
1 TABLET ORAL
Qty: 0 | Refills: 0 | DISCHARGE

## 2023-06-05 RX ORDER — ATORVASTATIN CALCIUM 80 MG/1
1 TABLET, FILM COATED ORAL
Qty: 0 | Refills: 0 | DISCHARGE
Start: 2023-06-05

## 2023-06-05 RX ORDER — GABAPENTIN 400 MG/1
1 CAPSULE ORAL
Qty: 0 | Refills: 0 | DISCHARGE
Start: 2023-06-05

## 2023-06-05 RX ORDER — METFORMIN HYDROCHLORIDE 850 MG/1
1 TABLET ORAL
Qty: 0 | Refills: 0 | DISCHARGE
Start: 2023-06-05

## 2023-06-05 RX ORDER — RISPERIDONE 4 MG/1
1 TABLET ORAL
Qty: 60 | Refills: 0
Start: 2023-06-05 | End: 2023-07-04

## 2023-06-05 RX ORDER — MIRTAZAPINE 45 MG/1
1 TABLET, ORALLY DISINTEGRATING ORAL
Qty: 0 | Refills: 0 | DISCHARGE

## 2023-06-05 RX ORDER — ATORVASTATIN CALCIUM 80 MG/1
1 TABLET, FILM COATED ORAL
Qty: 30 | Refills: 0
Start: 2023-06-05 | End: 2023-07-04

## 2023-06-05 RX ORDER — GUANFACINE 3 MG/1
1 TABLET, EXTENDED RELEASE ORAL
Qty: 30 | Refills: 0
Start: 2023-06-05 | End: 2023-07-04

## 2023-06-05 RX ORDER — MIRTAZAPINE 45 MG/1
1 TABLET, ORALLY DISINTEGRATING ORAL
Qty: 0 | Refills: 0 | DISCHARGE
Start: 2023-06-05

## 2023-06-05 RX ORDER — LANOLIN ALCOHOL/MO/W.PET/CERES
1 CREAM (GRAM) TOPICAL
Qty: 30 | Refills: 0
Start: 2023-06-05 | End: 2023-07-04

## 2023-06-05 RX ORDER — GABAPENTIN 400 MG/1
1 CAPSULE ORAL
Qty: 60 | Refills: 0
Start: 2023-06-05 | End: 2023-07-04

## 2023-06-05 RX ORDER — PANTOPRAZOLE SODIUM 20 MG/1
1 TABLET, DELAYED RELEASE ORAL
Qty: 30 | Refills: 0
Start: 2023-06-05 | End: 2023-07-04

## 2023-06-05 RX ORDER — LORATADINE 10 MG/1
1 TABLET ORAL
Qty: 30 | Refills: 0
Start: 2023-06-05 | End: 2023-07-04

## 2023-06-05 RX ORDER — RISPERIDONE 4 MG/1
1 TABLET ORAL
Qty: 0 | Refills: 0 | DISCHARGE
Start: 2023-06-05

## 2023-06-05 RX ORDER — LEVOTHYROXINE SODIUM 125 MCG
1 TABLET ORAL
Qty: 30 | Refills: 0
Start: 2023-06-05 | End: 2023-07-04

## 2023-06-05 RX ORDER — TAMSULOSIN HYDROCHLORIDE 0.4 MG/1
1 CAPSULE ORAL
Qty: 0 | Refills: 0 | DISCHARGE
Start: 2023-06-05

## 2023-06-05 RX ORDER — DIVALPROEX SODIUM 500 MG/1
1 TABLET, DELAYED RELEASE ORAL
Qty: 0 | Refills: 0 | DISCHARGE
Start: 2023-06-05

## 2023-06-05 RX ORDER — METFORMIN HYDROCHLORIDE 850 MG/1
1 TABLET ORAL
Qty: 60 | Refills: 0
Start: 2023-06-05 | End: 2023-07-04

## 2023-06-05 RX ADMIN — Medication 650 MILLIGRAM(S): at 23:00

## 2023-06-05 RX ADMIN — Medication 50 MICROGRAM(S): at 06:14

## 2023-06-05 RX ADMIN — Medication 650 MILLIGRAM(S): at 20:17

## 2023-06-05 RX ADMIN — LORATADINE 10 MILLIGRAM(S): 10 TABLET ORAL at 08:43

## 2023-06-05 RX ADMIN — RISPERIDONE 4 MILLIGRAM(S): 4 TABLET ORAL at 20:16

## 2023-06-05 RX ADMIN — METFORMIN HYDROCHLORIDE 1000 MILLIGRAM(S): 850 TABLET ORAL at 08:23

## 2023-06-05 RX ADMIN — ATORVASTATIN CALCIUM 10 MILLIGRAM(S): 80 TABLET, FILM COATED ORAL at 20:16

## 2023-06-05 RX ADMIN — DIVALPROEX SODIUM 500 MILLIGRAM(S): 500 TABLET, DELAYED RELEASE ORAL at 08:43

## 2023-06-05 RX ADMIN — TAMSULOSIN HYDROCHLORIDE 0.4 MILLIGRAM(S): 0.4 CAPSULE ORAL at 20:15

## 2023-06-05 RX ADMIN — SENNA PLUS 2 TABLET(S): 8.6 TABLET ORAL at 20:15

## 2023-06-05 RX ADMIN — GABAPENTIN 400 MILLIGRAM(S): 400 CAPSULE ORAL at 08:43

## 2023-06-05 RX ADMIN — PANTOPRAZOLE SODIUM 20 MILLIGRAM(S): 20 TABLET, DELAYED RELEASE ORAL at 08:19

## 2023-06-05 RX ADMIN — METFORMIN HYDROCHLORIDE 1000 MILLIGRAM(S): 850 TABLET ORAL at 17:59

## 2023-06-05 RX ADMIN — DIVALPROEX SODIUM 500 MILLIGRAM(S): 500 TABLET, DELAYED RELEASE ORAL at 20:15

## 2023-06-05 RX ADMIN — GABAPENTIN 400 MILLIGRAM(S): 400 CAPSULE ORAL at 20:15

## 2023-06-05 RX ADMIN — RISPERIDONE 4 MILLIGRAM(S): 4 TABLET ORAL at 08:43

## 2023-06-05 RX ADMIN — Medication 325 MILLIGRAM(S): at 20:15

## 2023-06-05 RX ADMIN — Medication 5 MILLIGRAM(S): at 23:43

## 2023-06-05 RX ADMIN — MIRTAZAPINE 15 MILLIGRAM(S): 45 TABLET, ORALLY DISINTEGRATING ORAL at 20:16

## 2023-06-05 NOTE — BH INPATIENT PSYCHIATRY PROGRESS NOTE - NSTXCOPEDATETRGT_PSY_ALL_CORE
03-Jun-2023
10-Sudhakar-2023
06-Jun-2023
03-Jun-2023
03-Jun-2023
06-Jun-2023
10-Sudhakar-2023
10-Sudhakar-2023
06-Jun-2023

## 2023-06-05 NOTE — BH INPATIENT PSYCHIATRY DISCHARGE NOTE - NSBHFUPINTERVALCCFT_PSY_A_CORE
BH discharge progress note date and time token  Discharge Progress Note Date and Time: 06-06-23 @ 11:27  Patient seen for follow up for mood and psychosis

## 2023-06-05 NOTE — BH INPATIENT PSYCHIATRY PROGRESS NOTE - NSTXSUICIDPROGRES_PSY_ALL_CORE
PROGRESS NOTE      Chief Complaint   Patient presents with    Hypertension     Pt here for 6 weks f/u     Medication Refill        HPI  Hypertension  This is a chronic problem. The problem is unchanged. Associated symptoms include anxiety. Pertinent negatives include no chest pain, headaches, neck pain, palpitations or shortness of breath. Risk factors for coronary artery disease include stress, obesity and family history. Past Medical History, Past Surgical History, Family history, Social History, and Medications were all reviewed with the patient today and updated as necessary. Current Outpatient Medications   Medication Sig Dispense Refill    metoprolol succinate (TOPROL XL) 50 MG extended release tablet Take 0.5 tablets by mouth daily 45 tablet 1    lisinopril (PRINIVIL;ZESTRIL) 10 MG tablet Take 1 tablet by mouth daily 90 tablet 1    Meth-Hyo-M Bl-Na Phos-Ph Sal (METHENAMINE-NABISPHOSPHATE-PHENYL SALICYLATE-METHYLENE BLUE-HYOSCYAMINE) 81.6 MG TABS tablet Take 1 tablet by mouth 4 times daily as needed (bladder pain) 30 tablet 3    albuterol sulfate  (90 Base) MCG/ACT inhaler Inhale 2 puffs into the lungs every 4 hours as needed      albuterol (PROVENTIL) (2.5 MG/3ML) 0.083% nebulizer solution Inhale 2.5 mg into the lungs every 4 hours as needed      DULoxetine (CYMBALTA) 60 MG extended release capsule Take 60 mg by mouth 2 times daily       estradiol (ESTRACE) 2 MG tablet Take 2 mg by mouth daily       LORazepam (ATIVAN) 0.5 MG tablet Take 0.5 mg by mouth 2 times daily as needed.       traZODone (DESYREL) 50 MG tablet Take 50 mg by mouth nightly as needed       Ubrogepant 50 MG TABS Take 50 mg by mouth daily as needed      valACYclovir (VALTREX) 500 MG tablet Take 500 mg by mouth daily       Current Facility-Administered Medications   Medication Dose Route Frequency Provider Last Rate Last Admin    cefTRIAXone (ROCEPHIN) injection 1,000 mg  1,000 mg IntraMUSCular Q24H Davidson Castro
MD Jono   1,000 mg at 06/10/22 0920     Allergies   Allergen Reactions    Red Dye Anaphylaxis             ROS  Review of Systems   Constitutional: Negative for fatigue, fever and unexpected weight change. HENT: Negative for congestion, ear pain, hearing loss, sore throat, tinnitus and voice change. Eyes: Negative for pain and visual disturbance. Respiratory: Negative for cough, chest tightness, shortness of breath and wheezing. Cardiovascular: Negative for chest pain, palpitations and leg swelling. Gastrointestinal: Negative for abdominal pain, constipation, diarrhea, nausea and vomiting. Genitourinary: Negative for dysuria, frequency, hematuria and urgency. Musculoskeletal: Negative for arthralgias, gait problem, joint swelling and neck pain. Skin: Negative for color change and rash. Neurological: Negative for dizziness, syncope, numbness and headaches. Hematological: Negative for adenopathy. Psychiatric/Behavioral: Negative for decreased concentration, hallucinations, sleep disturbance and suicidal ideas. The patient is not nervous/anxious. OBJECTIVE:  BP (!) 140/80   Ht 5' 1\" (1.549 m)   Wt 171 lb (77.6 kg)   BMI 32.31 kg/m²      PHYSICAL EXAM  Physical Exam  Constitutional:       Appearance: Normal appearance. HENT:      Head: Normocephalic and atraumatic. Right Ear: External ear normal.      Left Ear: External ear normal.      Nose: Nose normal.   Eyes:      General:         Right eye: No discharge. Left eye: No discharge. Extraocular Movements: Extraocular movements intact. Conjunctiva/sclera: Conjunctivae normal.   Pulmonary:      Effort: Pulmonary effort is normal.   Musculoskeletal:         General: Normal range of motion. Cervical back: Normal range of motion and neck supple. Skin:     Findings: No erythema or rash. Neurological:      General: No focal deficit present.       Mental Status: She is alert and oriented to person, place,
Improving
and time. Psychiatric:         Mood and Affect: Mood normal.          Medical problems and test results were reviewed with the patient today.      Recent Results (from the past 672 hour(s))   AMB POC URINALYSIS DIP STICK AUTO W/O MICRO    Collection Time: 06/01/22  8:55 AM   Result Value Ref Range    Color (UA POC)      Clarity (UA POC)      Glucose, Urine, POC Negative Negative    Bilirubin, Urine, POC Negative Negative    KETONES, Urine, POC Negative Negative    Specific Gravity, Urine, POC 1.025 (A) 1.001 - 1.035    Blood (UA POC) Moderate Negative    pH, Urine, POC 6.0 4.6 - 8.0    Protein, Urine,   Negative    Urobilinogen, POC 0.2 mg/dL     Nitrite, Urine, POC Negative Negative    Leukocyte Esterase, Urine, POC Large Negative   Culture, Urine    Collection Time: 06/01/22  5:07 PM    Specimen: URINE-CLEAN CATCH   Result Value Ref Range    Special Requests NO SPECIAL REQUESTS      Culture >100,000 COLONIES/mL ESCHERICHIA COLI (A)      Culture <1,000 CFU/ML MIXED SKIN CLAYTON ISOLATED         Susceptibility    Escherichia coli - BACTERIAL SUSCEPTIBILITY PANEL FABIENNE     trimethoprim-sulfamethoxazole >2/38 Resistant ug/mL     nitrofurantoin <=16 Sensitive ug/mL     ampicillin >16 Resistant ug/mL     amikacin <=8 Sensitive ug/mL     gentamicin >8 Resistant ug/mL     tobramycin 8 Intermediate ug/mL     ampicillin-sulbactam 16/8 Intermediate ug/mL     ceFAZolin 2 Sensitive ug/mL     cefTRIAXone <=1 Sensitive ug/mL     cefepime <=1 Sensitive ug/mL     piperacillin-tazobactam <=2/4 Sensitive ug/mL     aztreonam <=2 Sensitive ug/mL   AMB POC URINALYSIS DIP STICK AUTO W/O MICRO    Collection Time: 06/10/22  8:59 AM   Result Value Ref Range    Color (UA POC)      Clarity (UA POC)      Glucose, Urine, POC Negative Negative    Bilirubin, Urine, POC Negative Negative    KETONES, Urine, POC Negative Negative    Specific Gravity, Urine, POC 1.020 1.001 - 1.035    Blood (UA POC) Negative Negative    pH, Urine, POC 6.5 4.6 -
8.0    Protein, Urine, POC Negative Negative    Urobilinogen, POC 0.2 mg/dL     Nitrite, Urine, POC Negative Negative    Leukocyte Esterase, Urine, POC Trace Negative         ASSESSMENT and PLAN  Elham Pearson was seen today for hypertension and medication refill. Diagnoses and all orders for this visit:    Hypertension due to endocrine disorder  Comments:  BP simillar at home, cont 25 toprol, add lisinopril 10 mg  Orders:  -     metoprolol succinate (TOPROL XL) 50 MG extended release tablet; Take 0.5 tablets by mouth daily  -     lisinopril (PRINIVIL;ZESTRIL) 10 MG tablet; Take 1 tablet by mouth daily    Reactive airways dysfunction syndrome ([address]    Anxiety  Comments:  pt has appt htis friday with psych With NP Carly Gordon, doing better today than 6 week, doing ok will cont care with psych    Depression, unspecified depression type  Comments:  seeing psych friday. On the basis of positive PHQ-9 screening (PHQ-9 Total Score: 18), the following plan was implemented: referral to Behavioral health provided.   Patient will follow-up in 3 day(s) with psychiatrist.    [facility]  Return in about 3 months (around 9/28/2022), or if symptoms worsen or fail to improve, for f/u on BP and follow from her psych referral.
Improving

## 2023-06-05 NOTE — BH INPATIENT PSYCHIATRY PROGRESS NOTE - NSBHFUPINTERVALCCFT_PSY_A_CORE
Patient seen for follow up for mood and psychosis.

## 2023-06-05 NOTE — BH INPATIENT PSYCHIATRY PROGRESS NOTE - NSTXSUICIDDATETRGT_PSY_ALL_CORE
06-Jun-2023
31-May-2023
06-Jun-2023
31-May-2023
31-May-2023
06-Jun-2023
06-Jun-2023

## 2023-06-05 NOTE — REVIEW OF SYSTEMS
[Shortness Of Breath] : no shortness of breath [Wheezing] : wheezing [SOB on Exertion] : no shortness of breath during exertion [Abdominal Pain] : no abdominal pain [Vomiting] : no vomiting [Constipation] : no constipation [Joint Pain] : joint pain [Joint Stiffness] : no joint stiffness [Muscle Pain] : no muscle pain [Muscle Weakness] : no muscle weakness [Skin Wound] : skin wound [Negative] : Allergic/Immunologic [de-identified] : left hand wound

## 2023-06-05 NOTE — BH INPATIENT PSYCHIATRY DISCHARGE NOTE - NSDCCPCAREPLAN_GEN_ALL_CORE_FT
PRINCIPAL DISCHARGE DIAGNOSIS  Diagnosis: Mood disorder  Assessment and Plan of Treatment:       SECONDARY DISCHARGE DIAGNOSES  Diagnosis: Autistic spectrum disorder  Assessment and Plan of Treatment:     Diagnosis: Moderate intellectual disability  Assessment and Plan of Treatment:     Diagnosis: Impulse control disorder  Assessment and Plan of Treatment:

## 2023-06-05 NOTE — BH INPATIENT PSYCHIATRY PROGRESS NOTE - PRN MEDS
MEDICATIONS  (PRN):  acetaminophen     Tablet .. 650 milliGRAM(s) Oral every 6 hours PRN Mild Pain (1 - 3), Moderate Pain (4 - 6)  albuterol    90 MICROgram(s) HFA Inhaler 2 Puff(s) Inhalation every 6 hours PRN Shortness of Breath  bacitracin   Ointment 1 Application(s) Topical three times a day PRN Rash  chlorproMAZINE    Injectable 75 milliGRAM(s) IntraMuscular once PRN agitation  chlorproMAZINE    Tablet 50 milliGRAM(s) Oral every 4 hours PRN agitation  ibuprofen  Tablet. 400 milliGRAM(s) Oral every 6 hours PRN Mild Pain (1 - 3), Moderate Pain (4 - 6)  
MEDICATIONS  (PRN):  albuterol    90 MICROgram(s) HFA Inhaler 2 Puff(s) Inhalation every 6 hours PRN Shortness of Breath  bacitracin   Ointment 1 Application(s) Topical three times a day PRN Rash  chlorproMAZINE    Injectable 75 milliGRAM(s) IntraMuscular once PRN agitation  chlorproMAZINE    Tablet 50 milliGRAM(s) Oral every 4 hours PRN agitation  
MEDICATIONS  (PRN):  albuterol    90 MICROgram(s) HFA Inhaler 2 Puff(s) Inhalation every 6 hours PRN Shortness of Breath  bacitracin   Ointment 1 Application(s) Topical three times a day PRN Rash  chlorproMAZINE    Injectable 50 milliGRAM(s) IntraMuscular once PRN Agitation  chlorproMAZINE    Tablet 50 milliGRAM(s) Oral every 6 hours PRN Agitation  
MEDICATIONS  (PRN):  albuterol    90 MICROgram(s) HFA Inhaler 2 Puff(s) Inhalation every 6 hours PRN Shortness of Breath  bacitracin   Ointment 1 Application(s) Topical three times a day PRN Rash  chlorproMAZINE    Injectable 75 milliGRAM(s) IntraMuscular once PRN agitation  chlorproMAZINE    Tablet 50 milliGRAM(s) Oral every 4 hours PRN agitation  
MEDICATIONS  (PRN):  albuterol    90 MICROgram(s) HFA Inhaler 2 Puff(s) Inhalation every 6 hours PRN Shortness of Breath  bacitracin   Ointment 1 Application(s) Topical three times a day PRN Rash  chlorproMAZINE    Injectable 50 milliGRAM(s) IntraMuscular once PRN Agitation  chlorproMAZINE    Tablet 50 milliGRAM(s) Oral every 6 hours PRN Agitation  
MEDICATIONS  (PRN):  acetaminophen     Tablet .. 650 milliGRAM(s) Oral every 6 hours PRN Mild Pain (1 - 3), Moderate Pain (4 - 6)  albuterol    90 MICROgram(s) HFA Inhaler 2 Puff(s) Inhalation every 6 hours PRN Shortness of Breath  bacitracin   Ointment 1 Application(s) Topical three times a day PRN Rash  chlorproMAZINE    Injectable 75 milliGRAM(s) IntraMuscular once PRN agitation  chlorproMAZINE    Tablet 50 milliGRAM(s) Oral every 4 hours PRN agitation  ibuprofen  Tablet. 400 milliGRAM(s) Oral every 6 hours PRN Mild Pain (1 - 3), Moderate Pain (4 - 6)  
MEDICATIONS  (PRN):  albuterol    90 MICROgram(s) HFA Inhaler 2 Puff(s) Inhalation every 6 hours PRN Shortness of Breath  bacitracin   Ointment 1 Application(s) Topical three times a day PRN Rash  chlorproMAZINE    Injectable 50 milliGRAM(s) IntraMuscular once PRN Agitation  chlorproMAZINE    Tablet 50 milliGRAM(s) Oral every 6 hours PRN Agitation  
MEDICATIONS  (PRN):  acetaminophen     Tablet .. 650 milliGRAM(s) Oral every 6 hours PRN Mild Pain (1 - 3), Moderate Pain (4 - 6)  albuterol    90 MICROgram(s) HFA Inhaler 2 Puff(s) Inhalation every 6 hours PRN Shortness of Breath  bacitracin   Ointment 1 Application(s) Topical three times a day PRN Rash  chlorproMAZINE    Injectable 75 milliGRAM(s) IntraMuscular once PRN agitation  chlorproMAZINE    Tablet 50 milliGRAM(s) Oral every 4 hours PRN agitation  ibuprofen  Tablet. 400 milliGRAM(s) Oral every 6 hours PRN Mild Pain (1 - 3), Moderate Pain (4 - 6)  
MEDICATIONS  (PRN):  acetaminophen     Tablet .. 650 milliGRAM(s) Oral every 6 hours PRN Mild Pain (1 - 3), Moderate Pain (4 - 6)  albuterol    90 MICROgram(s) HFA Inhaler 2 Puff(s) Inhalation every 6 hours PRN Shortness of Breath  bacitracin   Ointment 1 Application(s) Topical three times a day PRN Rash  chlorproMAZINE    Injectable 75 milliGRAM(s) IntraMuscular once PRN agitation  chlorproMAZINE    Tablet 50 milliGRAM(s) Oral every 4 hours PRN agitation  ibuprofen  Tablet. 400 milliGRAM(s) Oral every 6 hours PRN Mild Pain (1 - 3), Moderate Pain (4 - 6)

## 2023-06-05 NOTE — BH INPATIENT PSYCHIATRY PROGRESS NOTE - NSTXPROBVIOLNT_PSY_ALL_CORE
VIOLENT/AGGRESSIVE BEHAVIOR

## 2023-06-05 NOTE — BH INPATIENT PSYCHIATRY DISCHARGE NOTE - NSDCPROCEDURESFT_PSY_ALL_CORE
Pt fell off his bed during his hospitalization and landed on his left arm and c/o pain on 5/31.  Pt sent to [facility] ED by medical PA for left wrist and forearm x-ray which showed no sign of fracture

## 2023-06-05 NOTE — BH INPATIENT PSYCHIATRY PROGRESS NOTE - NSBHMSETHTPROC_PSY_A_CORE
Linear/Other
Linear/Perseverative/Other
Linear/Other
Linear/Other
Linear/Perseverative/Other
Linear/Other
Linear/Other
Linear/Perseverative/Other
Linear/Other

## 2023-06-05 NOTE — BH INPATIENT PSYCHIATRY PROGRESS NOTE - NSBHCHARTREVIEWVS_PSY_A_CORE FT
Vital Signs Last 24 Hrs  T(C): --  T(F): --  HR: 98 (06-05-23 @ 06:36) (98 - 98)  BP: 115/76 (06-05-23 @ 06:36) (115/76 - 115/76)  BP(mean): --  RR: --  SpO2: --

## 2023-06-05 NOTE — BH INPATIENT PSYCHIATRY PROGRESS NOTE - NSBHASSESSSUMMFT_PSY_ALL_CORE
Pt is a 32 yo M, single, disabled, non-caregiver, resides at a [facility], with psych h/o moderate ID, ASD, impulse control disorder, factitious disorder, HAVEN, mood disorders, PTSD and ADHD, multiple past psych admissions (last known [facility] 12/19-12/23/21), numerous ED visits for both medical/psych complaints, longstanding h/o SIB (head banging, cutting), but no known SA, longstanding h/o endorsing SI, h/o aggression toward staff at , [facility] significant for asthma, DM, urinary retention, GERD, BPH, hypothyroidism, HLD, HTN, and b/l myopia, on Depakote 500 mg BID, Guanfacine 2 mg, Risperdal 4 mg, and Remeron 15 mg, prescribed by his PCP, who presents to the ED for suicidal and homicidal thoughts to kill self and kill  staff.    On assessment, pt presents with concrete thought process.  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  He endorses that he would kick staff members or would stab himself.  Patient is unable to engage in safety planning.  Patient is unable to identify any specific trigger for why these thoughts have surfaced today. He reported that feel like his medications are not working. He endorses depressed mood, his affect is flat and nonreactive.  Denies manic symptoms.   Denies auditory or visual hallucinations, denies paranoid ideations. Denies alcohol/substance use.   Per collateral from group staff they have significant safety concerns, they are advocating for hospitalization, patient will be admitted involuntarily for safety, stabilization, and treatment.    Upon assessment, patient denies SI/HI/I/P or AH/VH or paranoia.        PLAN:   1. Admit to Low 6, 9.39  2. CO for safety due to SIB and aggression  3. Continue Home medications:  	Risperdal 4mg BID, Depakote 500mg BID, Remeron 15mg QHS, Buspar tapered to 5mg PO BID on 6/1 and discontinued after 2 days, add Neurontin and titrate to 400mg PO BID, Guanfacine 2mg daily in AM, Pravastatin 20mg daily, Loratadine 10mg daily, Levothyroxine 50mcg daily, Metformin 1000mg BID, Pantoprazole 20mg daily, Tamsulosin 0.4mg QHS, Ferrous Sulfate 325mg daily, senna 8.6mg 2 tabs QHS.  PRN medications:  - Thorazine 50mg q6h PO/IM PRN for Agitation  - Albuterol for asthma  4. Labs ordered for am  5. Dispo planning per  pending clinical improvement  symptom management, safety planning, care coordination  Pt is a 32 yo M, single, disabled, non-caregiver, resides at a [facility], with psych h/o moderate ID, ASD, impulse control disorder, factitious disorder, HAVEN, mood disorders, PTSD and ADHD, multiple past psych admissions (last known [facility] 12/19-12/23/21), numerous ED visits for both medical/psych complaints, longstanding h/o SIB (head banging, cutting), but no known SA, longstanding h/o endorsing SI, h/o aggression toward staff at , [facility] significant for asthma, DM, urinary retention, GERD, BPH, hypothyroidism, HLD, HTN, and b/l myopia, on Depakote 500 mg BID, Guanfacine 2 mg, Risperdal 4 mg, and Remeron 15 mg, prescribed by his PCP, who presents to the ED for suicidal and homicidal thoughts to kill self and kill  staff.    On assessment, pt presents with concrete thought process.  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  He endorses that he would kick staff members or would stab himself.  Patient is unable to engage in safety planning.  Patient is unable to identify any specific trigger for why these thoughts have surfaced today. He reported that feel like his medications are not working. He endorses depressed mood, his affect is flat and nonreactive.  Denies manic symptoms.   Denies auditory or visual hallucinations, denies paranoid ideations. Denies alcohol/substance use.   Per collateral from group staff they have significant safety concerns, they are advocating for hospitalization, patient will be admitted involuntarily for safety, stabilization, and treatment.    Upon assessment, patient endorses SI and HI towards staff at the group [place]. He is on CO for safety.    PLAN:   1. Admit to Low 6, 9.39  2. CO for safety  3. Continue Home medications:  	Risperdal 4mg BID, Depakote 500mg BID, Remeron 15mg QHS, Buspar 15mg QHS, Guanfacine 2mg daily in AM, Pravastatin 20mg daily, Loratadine 10mg daily, Levothyroxine 50mcg daily, Metformin 1000mg BID, Pantoprazole 20mg daily, Tamsulosin 0.4mg QHS, Ferrous Sulfate 325mg daily, senna 8.6mg 2 tabs QHS.  PRN medications:  - Thorazine 50mg q6h PO/IM PRN for Agitation  - Albuterol for asthma  4. Labs ordered for am  5. Dispo planning per  pending clinical improvement  
Pt is a 34 yo M, single, disabled, non-caregiver, resides at a [facility], with psych h/o moderate ID, ASD, impulse control disorder, factitious disorder, HAVEN, mood disorders, PTSD and ADHD, multiple past psych admissions (last known [facility] 12/19-12/23/21), numerous ED visits for both medical/psych complaints, longstanding h/o SIB (head banging, cutting), but no known SA, longstanding h/o endorsing SI, h/o aggression toward staff at , [facility] significant for asthma, DM, urinary retention, GERD, BPH, hypothyroidism, HLD, HTN, and b/l myopia, on Depakote 500 mg BID, Guanfacine 2 mg, Risperdal 4 mg, and Remeron 15 mg, prescribed by his PCP, who presents to the ED for suicidal and homicidal thoughts to kill self and kill  staff.    On assessment, pt presents with concrete thought process.  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  He endorses that he would kick staff members or would stab himself.  Patient is unable to engage in safety planning.  Patient is unable to identify any specific trigger for why these thoughts have surfaced today. He reported that feel like his medications are not working. He endorses depressed mood, his affect is flat and nonreactive.  Denies manic symptoms.   Denies auditory or visual hallucinations, denies paranoid ideations. Denies alcohol/substance use.   Per collateral from group staff they have significant safety concerns, they are advocating for hospitalization, patient will be admitted involuntarily for safety, stabilization, and treatment.    Upon assessment, patient reports feeling good.  Denies SI/HI/I/P or AH/VH or paranoia.  Pt eating and sleeping well.  Pt asks to come of CO 1:1 and contracts for safety, reports he will come to staff if he has thoughts to hurt himself or others    PLAN:   1. Admit to Low 6, 9.39  2. CO for safety discontinued on 5/31 and patient placed on routine checks  3. Continue Home medications:  	Risperdal 4mg BID, Depakote 500mg BID, Remeron 15mg QHS, Buspar tapered to 10mg PO BID, add Neurontin 300mg PO BID, Guanfacine 2mg daily in AM, Pravastatin 20mg daily, Loratadine 10mg daily, Levothyroxine 50mcg daily, Metformin 1000mg BID, Pantoprazole 20mg daily, Tamsulosin 0.4mg QHS, Ferrous Sulfate 325mg daily, senna 8.6mg 2 tabs QHS.  PRN medications:  - Thorazine 50mg q6h PO/IM PRN for Agitation  - Albuterol for asthma  4. Labs ordered for am  5. Dispo planning per  pending clinical improvement  symptom management, safety planning, care coordination  Pt is a 34 yo M, single, disabled, non-caregiver, resides at a [facility], with psych h/o moderate ID, ASD, impulse control disorder, factitious disorder, HAVEN, mood disorders, PTSD and ADHD, multiple past psych admissions (last known [facility] 12/19-12/23/21), numerous ED visits for both medical/psych complaints, longstanding h/o SIB (head banging, cutting), but no known SA, longstanding h/o endorsing SI, h/o aggression toward staff at , [facility] significant for asthma, DM, urinary retention, GERD, BPH, hypothyroidism, HLD, HTN, and b/l myopia, on Depakote 500 mg BID, Guanfacine 2 mg, Risperdal 4 mg, and Remeron 15 mg, prescribed by his PCP, who presents to the ED for suicidal and homicidal thoughts to kill self and kill  staff.    On assessment, pt presents with concrete thought process.  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  He endorses that he would kick staff members or would stab himself.  Patient is unable to engage in safety planning.  Patient is unable to identify any specific trigger for why these thoughts have surfaced today. He reported that feel like his medications are not working. He endorses depressed mood, his affect is flat and nonreactive.  Denies manic symptoms.   Denies auditory or visual hallucinations, denies paranoid ideations. Denies alcohol/substance use.   Per collateral from group staff they have significant safety concerns, they are advocating for hospitalization, patient will be admitted involuntarily for safety, stabilization, and treatment.    Upon assessment, patient endorses SI and HI towards staff at the group home. He is on CO for safety.    PLAN:   1. Admit to Low 6, 9.39  2. CO for safety  3. Continue Home medications:  	Risperdal 4mg BID, Depakote 500mg BID, Remeron 15mg QHS, Buspar 15mg QHS, Guanfacine 2mg daily in AM, Pravastatin 20mg daily, Loratadine 10mg daily, Levothyroxine 50mcg daily, Metformin 1000mg BID, Pantoprazole 20mg daily, Tamsulosin 0.4mg QHS, Ferrous Sulfate 325mg daily, senna 8.6mg 2 tabs QHS.  PRN medications:  - Thorazine 50mg q6h PO/IM PRN for Agitation  - Albuterol for asthma  4. Labs ordered for am  5. Dispo planning per  pending clinical improvement  
Pt is a 34 yo M, single, disabled, non-caregiver, resides at a [facility], with psych h/o moderate ID, ASD, impulse control disorder, factitious disorder, HAVEN, mood disorders, PTSD and ADHD, multiple past psych admissions (last known [facility] 12/19-12/23/21), numerous ED visits for both medical/psych complaints, longstanding h/o SIB (head banging, cutting), but no known SA, longstanding h/o endorsing SI, h/o aggression toward staff at , [facility] significant for asthma, DM, urinary retention, GERD, BPH, hypothyroidism, HLD, HTN, and b/l myopia, on Depakote 500 mg BID, Guanfacine 2 mg, Risperdal 4 mg, and Remeron 15 mg, prescribed by his PCP, who presents to the ED for suicidal and homicidal thoughts to kill self and kill  staff.    On assessment, pt presents with concrete thought process.  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  He endorses that he would kick staff members or would stab himself.  Patient is unable to engage in safety planning.  Patient is unable to identify any specific trigger for why these thoughts have surfaced today. He reported that feel like his medications are not working. He endorses depressed mood, his affect is flat and nonreactive.  Denies manic symptoms.   Denies auditory or visual hallucinations, denies paranoid ideations. Denies alcohol/substance use.   Per collateral from group staff they have significant safety concerns, they are advocating for hospitalization, patient will be admitted involuntarily for safety, stabilization, and treatment.    Upon assessment, patient denies SI/HI/I/P or AH/VH or paranoia.        PLAN:   1. Admit to Low 6, 9.39  2. CO for safety due to ligature risk from ace bandage  3. Continue Home medications:  	Risperdal 4mg BID, Depakote 500mg BID, Remeron 15mg QHS, Buspar tapered to 5mg PO BID on 6/1 and discontinued after 2 days, add Neurontin 300mg PO BID, Guanfacine 2mg daily in AM, Pravastatin 20mg daily, Loratadine 10mg daily, Levothyroxine 50mcg daily, Metformin 1000mg BID, Pantoprazole 20mg daily, Tamsulosin 0.4mg QHS, Ferrous Sulfate 325mg daily, senna 8.6mg 2 tabs QHS.  PRN medications:  - Thorazine 50mg q6h PO/IM PRN for Agitation  - Albuterol for asthma  4. Labs ordered for am  5. Dispo planning per  pending clinical improvement  symptom management, safety planning, care coordination  Pt is a 34 yo M, single, disabled, non-caregiver, resides at a [facility], with psych h/o moderate ID, ASD, impulse control disorder, factitious disorder, HAVEN, mood disorders, PTSD and ADHD, multiple past psych admissions (last known [facility] 12/19-12/23/21), numerous ED visits for both medical/psych complaints, longstanding h/o SIB (head banging, cutting), but no known SA, longstanding h/o endorsing SI, h/o aggression toward staff at , [facility] significant for asthma, DM, urinary retention, GERD, BPH, hypothyroidism, HLD, HTN, and b/l myopia, on Depakote 500 mg BID, Guanfacine 2 mg, Risperdal 4 mg, and Remeron 15 mg, prescribed by his PCP, who presents to the ED for suicidal and homicidal thoughts to kill self and kill  staff.    On assessment, pt presents with concrete thought process.  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  He endorses that he would kick staff members or would stab himself.  Patient is unable to engage in safety planning.  Patient is unable to identify any specific trigger for why these thoughts have surfaced today. He reported that feel like his medications are not working. He endorses depressed mood, his affect is flat and nonreactive.  Denies manic symptoms.   Denies auditory or visual hallucinations, denies paranoid ideations. Denies alcohol/substance use.   Per collateral from group staff they have significant safety concerns, they are advocating for hospitalization, patient will be admitted involuntarily for safety, stabilization, and treatment.    Upon assessment, patient endorses SI and HI towards staff at the group [place]. He is on CO for safety.    PLAN:   1. Admit to Low 6, 9.39  2. CO for safety  3. Continue Home medications:  	Risperdal 4mg BID, Depakote 500mg BID, Remeron 15mg QHS, Buspar 15mg QHS, Guanfacine 2mg daily in AM, Pravastatin 20mg daily, Loratadine 10mg daily, Levothyroxine 50mcg daily, Metformin 1000mg BID, Pantoprazole 20mg daily, Tamsulosin 0.4mg QHS, Ferrous Sulfate 325mg daily, senna 8.6mg 2 tabs QHS.  PRN medications:  - Thorazine 50mg q6h PO/IM PRN for Agitation  - Albuterol for asthma  4. Labs ordered for am  5. Dispo planning per  pending clinical improvement  
Pt is a 34 yo M, single, disabled, non-caregiver, resides at a [facility], with psych h/o moderate ID, ASD, impulse control disorder, factitious disorder, HAVEN, mood disorders, PTSD and ADHD, multiple past psych admissions (last known [facility] 12/19-12/23/21), numerous ED visits for both medical/psych complaints, longstanding h/o SIB (head banging, cutting), but no known SA, longstanding h/o endorsing SI, h/o aggression toward staff at , [facility] significant for asthma, DM, urinary retention, GERD, BPH, hypothyroidism, HLD, HTN, and b/l myopia, on Depakote 500 mg BID, Guanfacine 2 mg, Risperdal 4 mg, and Remeron 15 mg, prescribed by his PCP, who presents to the ED for suicidal and homicidal thoughts to kill self and kill  staff.    On assessment, pt presents with concrete thought process.  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  He endorses that he would kick staff members or would stab himself.  Patient is unable to engage in safety planning.  Patient is unable to identify any specific trigger for why these thoughts have surfaced today. He reported that feel like his medications are not working. He endorses depressed mood, his affect is flat and nonreactive.  Denies manic symptoms.   Denies auditory or visual hallucinations, denies paranoid ideations. Denies alcohol/substance use.   Per collateral from group staff they have significant safety concerns, they are advocating for hospitalization, patient will be admitted involuntarily for safety, stabilization, and treatment.    Upon assessment, patient is out on the unit, keeping to himself. He is on CO for safety.    PLAN:   1. Admit to Low 6, 9.39  2. CO for safety  3. Continue Home medications:  	Risperdal 4mg BID, Depakote 500mg BID, Remeron 15mg QHS, Buspar 15mg QHS, Guanfacine 2mg daily in AM, Pravastatin 20mg daily, Loratadine 10mg daily, Levothyroxine 50mcg daily, Metformin 1000mg BID, Pantoprazole 20mg daily, Tamsulosin 0.4mg QHS, Ferrous Sulfate 325mg daily, senna 8.6mg 2 tabs QHS.  PRN medications:  - Thorazine 50mg q6h PO/IM PRN for Agitation  - Albuterol for asthma  4. Labs ordered for am  5. Dispo planning per  pending clinical improvement  symptom management, safety planning, care coordination  Pt is a 34 yo M, single, disabled, non-caregiver, resides at a [facility], with psych h/o moderate ID, ASD, impulse control disorder, factitious disorder, HAVEN, mood disorders, PTSD and ADHD, multiple past psych admissions (last known [facility] 12/19-12/23/21), numerous ED visits for both medical/psych complaints, longstanding h/o SIB (head banging, cutting), but no known SA, longstanding h/o endorsing SI, h/o aggression toward staff at , [facility] significant for asthma, DM, urinary retention, GERD, BPH, hypothyroidism, HLD, HTN, and b/l myopia, on Depakote 500 mg BID, Guanfacine 2 mg, Risperdal 4 mg, and Remeron 15 mg, prescribed by his PCP, who presents to the ED for suicidal and homicidal thoughts to kill self and kill  staff.    On assessment, pt presents with concrete thought process.  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  He endorses that he would kick staff members or would stab himself.  Patient is unable to engage in safety planning.  Patient is unable to identify any specific trigger for why these thoughts have surfaced today. He reported that feel like his medications are not working. He endorses depressed mood, his affect is flat and nonreactive.  Denies manic symptoms.   Denies auditory or visual hallucinations, denies paranoid ideations. Denies alcohol/substance use.   Per collateral from group staff they have significant safety concerns, they are advocating for hospitalization, patient will be admitted involuntarily for safety, stabilization, and treatment.    Upon assessment, patient endorses SI and HI towards staff at the group [place]. He is on CO for safety.    PLAN:   1. Admit to Low 6, 9.39  2. CO for safety  3. Continue Home medications:  	Risperdal 4mg BID, Depakote 500mg BID, Remeron 15mg QHS, Buspar 15mg QHS, Guanfacine 2mg daily in AM, Pravastatin 20mg daily, Loratadine 10mg daily, Levothyroxine 50mcg daily, Metformin 1000mg BID, Pantoprazole 20mg daily, Tamsulosin 0.4mg QHS, Ferrous Sulfate 325mg daily, senna 8.6mg 2 tabs QHS.  PRN medications:  - Thorazine 50mg q6h PO/IM PRN for Agitation  - Albuterol for asthma  4. Labs ordered for am  5. Dispo planning per SW pending clinical improvement  
Pt is a 34 yo M, single, disabled, non-caregiver, resides at a [facility], with psych h/o moderate ID, ASD, impulse control disorder, factitious disorder, HAVEN, mood disorders, PTSD and ADHD, multiple past psych admissions (last known [facility] 12/19-12/23/21), numerous ED visits for both medical/psych complaints, longstanding h/o SIB (head banging, cutting), but no known SA, longstanding h/o endorsing SI, h/o aggression toward staff at , [facility] significant for asthma, DM, urinary retention, GERD, BPH, hypothyroidism, HLD, HTN, and b/l myopia, on Depakote 500 mg BID, Guanfacine 2 mg, Risperdal 4 mg, and Remeron 15 mg, prescribed by his PCP, who presents to the ED for suicidal and homicidal thoughts to kill self and kill  staff.    On assessment, pt presents with concrete thought process.  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  He endorses that he would kick staff members or would stab himself.  Patient is unable to engage in safety planning.  Patient is unable to identify any specific trigger for why these thoughts have surfaced today. He reported that feel like his medications are not working. He endorses depressed mood, his affect is flat and nonreactive.  Denies manic symptoms.   Denies auditory or visual hallucinations, denies paranoid ideations. Denies alcohol/substance use.   Per collateral from group staff they have significant safety concerns, they are advocating for hospitalization, patient will be admitted involuntarily for safety, stabilization, and treatment.    Upon assessment, patient denies SI/HI/I/P or AH/VH or paranoia.        PLAN:   1. Admit to Low 6, 9.39  2. CO for safety due to SIB and aggression  3. Continue Home medications:  	Risperdal 4mg BID, Depakote 500mg BID, Remeron 15mg QHS, Buspar tapered to 5mg PO BID on 6/1 and discontinued after 2 days, add Neurontin and titrate to 400mg PO BID, Guanfacine 2mg daily in AM, Pravastatin 20mg daily, Loratadine 10mg daily, Levothyroxine 50mcg daily, Metformin 1000mg BID, Pantoprazole 20mg daily, Tamsulosin 0.4mg QHS, Ferrous Sulfate 325mg daily, senna 8.6mg 2 tabs QHS.  PRN medications:  - Thorazine 50mg q6h PO/IM PRN for Agitation  - Albuterol for asthma  4. Labs ordered for am  5. Dispo planning per  pending clinical improvement  symptom management, safety planning, care coordination  Pt is a 34 yo M, single, disabled, non-caregiver, resides at a [facility], with psych h/o moderate ID, ASD, impulse control disorder, factitious disorder, HAVEN, mood disorders, PTSD and ADHD, multiple past psych admissions (last known [facility] 12/19-12/23/21), numerous ED visits for both medical/psych complaints, longstanding h/o SIB (head banging, cutting), but no known SA, longstanding h/o endorsing SI, h/o aggression toward staff at , [facility] significant for asthma, DM, urinary retention, GERD, BPH, hypothyroidism, HLD, HTN, and b/l myopia, on Depakote 500 mg BID, Guanfacine 2 mg, Risperdal 4 mg, and Remeron 15 mg, prescribed by his PCP, who presents to the ED for suicidal and homicidal thoughts to kill self and kill  staff.    On assessment, pt presents with concrete thought process.  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  He endorses that he would kick staff members or would stab himself.  Patient is unable to engage in safety planning.  Patient is unable to identify any specific trigger for why these thoughts have surfaced today. He reported that feel like his medications are not working. He endorses depressed mood, his affect is flat and nonreactive.  Denies manic symptoms.   Denies auditory or visual hallucinations, denies paranoid ideations. Denies alcohol/substance use.   Per collateral from group staff they have significant safety concerns, they are advocating for hospitalization, patient will be admitted involuntarily for safety, stabilization, and treatment.    Upon assessment, patient endorses SI and HI towards staff at the group [place]. He is on CO for safety.    PLAN:   1. Admit to Low 6, 9.39  2. CO for safety  3. Continue Home medications:  	Risperdal 4mg BID, Depakote 500mg BID, Remeron 15mg QHS, Buspar 15mg QHS, Guanfacine 2mg daily in AM, Pravastatin 20mg daily, Loratadine 10mg daily, Levothyroxine 50mcg daily, Metformin 1000mg BID, Pantoprazole 20mg daily, Tamsulosin 0.4mg QHS, Ferrous Sulfate 325mg daily, senna 8.6mg 2 tabs QHS.  PRN medications:  - Thorazine 50mg q6h PO/IM PRN for Agitation  - Albuterol for asthma  4. Labs ordered for am  5. Dispo planning per  pending clinical improvement  
Pt is a 34 yo M, single, disabled, non-caregiver, [facility] at a [facility], with psych h/o moderate ID, ASD, impulse control disorder, factitious disorder, HAVEN, mood disorders, PTSD and ADHD, multiple past psych admissions (last known [facility] 12/19-12/23/21), numerous ED visits for both medical/psych complaints, longstanding h/o SIB (head banging, cutting), but no known SA, longstanding h/o endorsing SI, h/o aggression toward staff at , [facility] significant for asthma, DM, urinary retention, GERD, BPH, hypothyroidism, HLD, HTN, and b/l myopia, on Depakote 500 mg BID, Guanfacine 2 mg, Risperdal 4 mg, and Remeron 15 mg, prescribed by his PCP, who presents to the ED for suicidal and homicidal thoughts to kill self and kill  staff.    On assessment, pt presents with concrete thought process.  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  He endorses that he would kick staff members or would stab himself.  Patient is unable to engage in safety planning.  Patient is unable to identify any specific trigger for why these thoughts have surfaced today. He reported that feel like his medications are not working. He endorses depressed mood, his affect is flat and nonreactive.  Denies manic symptoms.   Denies auditory or visual hallucinations, denies paranoid ideations. Denies alcohol/substance use.   Per collateral from group staff they have significant safety concerns, they are advocating for hospitalization, patient will be admitted involuntarily for safety, stabilization, and treatment.    Upon assessment, patient reports feeling anxious with homicidal and suicidal ideation no I/P. He is on CO for safety.    PLAN:   1. Admit to Low 6, 9.39  2. CO for safety  3. Continue Home medications:  	Risperdal 4mg BID, Depakote 500mg BID, Remeron 15mg QHS, Buspar tapered to 10mg PO BID, add Neurontin 300mg PO BID, Guanfacine 2mg daily in AM, Pravastatin 20mg daily, Loratadine 10mg daily, Levothyroxine 50mcg daily, Metformin 1000mg BID, Pantoprazole 20mg daily, Tamsulosin 0.4mg QHS, Ferrous Sulfate 325mg daily, senna 8.6mg 2 tabs QHS.  PRN medications:  - Thorazine 50mg q6h PO/IM PRN for Agitation  - Albuterol for asthma  4. Labs ordered for am  5. Dispo planning per  pending clinical improvement  symptom management, safety planning, care coordination  Pt is a 34 yo M, single, disabled, non-caregiver, resides at a [facility], with psych h/o moderate ID, ASD, impulse control disorder, factitious disorder, HAVEN, mood disorders, PTSD and ADHD, multiple past psych admissions (last known [facility] 12/19-12/23/21), numerous ED visits for both medical/psych complaints, longstanding h/o SIB (head banging, cutting), but no known SA, longstanding h/o endorsing SI, h/o aggression toward staff at , PMH significant for asthma, DM, urinary retention, GERD, BPH, hypothyroidism, HLD, HTN, and b/l myopia, on Depakote 500 mg BID, Guanfacine 2 mg, Risperdal 4 mg, and Remeron 15 mg, prescribed by his PCP, who presents to the ED for suicidal and homicidal thoughts to kill self and kill  staff.    On assessment, pt presents with concrete thought process.  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  He endorses that he would kick staff members or would stab himself.  Patient is unable to engage in safety planning.  Patient is unable to identify any specific trigger for why these thoughts have surfaced today. He reported that feel like his medications are not working. He endorses depressed mood, his affect is flat and nonreactive.  Denies manic symptoms.   Denies auditory or visual hallucinations, denies paranoid ideations. Denies alcohol/substance use.   Per collateral from group staff they have significant safety concerns, they are advocating for hospitalization, patient will be admitted involuntarily for safety, stabilization, and treatment.    Upon assessment, patient endorses SI and HI towards staff at the group home. He is on CO for safety.    PLAN:   1. Admit to Low 6, 9.39  2. CO for safety  3. Continue Home medications:  	Risperdal 4mg BID, Depakote 500mg BID, Remeron 15mg QHS, Buspar 15mg QHS, Guanfacine 2mg daily in AM, Pravastatin 20mg daily, Loratadine 10mg daily, Levothyroxine 50mcg daily, Metformin 1000mg BID, Pantoprazole 20mg daily, Tamsulosin 0.4mg QHS, Ferrous Sulfate 325mg daily, senna 8.6mg 2 tabs QHS.  PRN medications:  - Thorazine 50mg q6h PO/IM PRN for Agitation  - Albuterol for asthma  4. Labs ordered for am  5. Dispo planning per  pending clinical improvement  
Pt is a 32 yo M, single, disabled, non-caregiver, resides at a [facility], with psych h/o moderate ID, ASD, impulse control disorder, factitious disorder, HAVEN, mood disorders, PTSD and ADHD, multiple past psych admissions (last known [facility] 12/19-12/23/21), numerous ED visits for both medical/psych complaints, longstanding h/o SIB (head banging, cutting), but no known SA, longstanding h/o endorsing SI, h/o aggression toward staff at , PMH significant for asthma, DM, urinary retention, GERD, BPH, hypothyroidism, HLD, HTN, and b/l myopia, on Depakote 500 mg BID, Guanfacine 2 mg, Risperdal 4 mg, and Remeron 15 mg, prescribed by his PCP, who presents to the ED for suicidal and homicidal thoughts to kill self and kill  staff.    On assessment, pt presents with concrete thought process.  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  He endorses that he would kick staff members or would stab himself.  Patient is unable to engage in safety planning.  Patient is unable to identify any specific trigger for why these thoughts have surfaced today. He reported that feel like his medications are not working. He endorses depressed mood, his affect is flat and nonreactive.  Denies manic symptoms.   Denies auditory or visual hallucinations, denies paranoid ideations. Denies alcohol/substance use.   Per collateral from group staff they have significant safety concerns, they are advocating for hospitalization, patient will be admitted involuntarily for safety, stabilization, and treatment.    Upon assessment, patient was laying down and minimally cooperative. He is on CO for safety.    PLAN:   1. Admit to Low 6, 9.39  2. CO for safety  3. Continue Home medications:  	Risperdal 4mg BID, Depakote 500mg BID, Remeron 15mg QHS, Buspar 15mg QHS, Guanfacine 2mg daily in AM, Pravastatin 20mg daily, Loratadine 10mg daily, Levothyroxine 50mcg daily, Metformin 1000mg BID, Pantoprazole 20mg daily, Tamsulosin 0.4mg QHS, Ferrous Sulfate 325mg daily, senna 8.6mg 2 tabs QHS.  PRN medications:  - Thorazine 50mg q6h PO/IM PRN for Agitation  - Albuterol for asthma  4. Labs ordered for am  5. Dispo planning per  pending clinical improvement  symptom management, safety planning, care coordination  Pt is a 32 yo M, single, disabled, non-caregiver, resides at a [facility], with psych h/o moderate ID, ASD, impulse control disorder, factitious disorder, HAVEN, mood disorders, PTSD and ADHD, multiple past psych admissions (last known [facility] 12/19-12/23/21), numerous ED visits for both medical/psych complaints, longstanding h/o SIB (head banging, cutting), but no known SA, longstanding h/o endorsing SI, h/o aggression toward staff at , [facility] significant for asthma, DM, urinary retention, GERD, BPH, hypothyroidism, HLD, HTN, and b/l myopia, on Depakote 500 mg BID, Guanfacine 2 mg, Risperdal 4 mg, and Remeron 15 mg, prescribed by his PCP, who presents to the ED for suicidal and homicidal thoughts to kill self and kill  staff.    On assessment, pt presents with concrete thought process.  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  He endorses that he would kick staff members or would stab himself.  Patient is unable to engage in safety planning.  Patient is unable to identify any specific trigger for why these thoughts have surfaced today. He reported that feel like his medications are not working. He endorses depressed mood, his affect is flat and nonreactive.  Denies manic symptoms.   Denies auditory or visual hallucinations, denies paranoid ideations. Denies alcohol/substance use.   Per collateral from group staff they have significant safety concerns, they are advocating for hospitalization, patient will be admitted involuntarily for safety, stabilization, and treatment.    Upon assessment, patient endorses SI and HI towards staff at the group [place]. He is on CO for safety.    PLAN:   1. Admit to Low 6, 9.39  2. CO for safety  3. Continue Home medications:  	Risperdal 4mg BID, Depakote 500mg BID, Remeron 15mg QHS, Buspar 15mg QHS, Guanfacine 2mg daily in AM, Pravastatin 20mg daily, Loratadine 10mg daily, Levothyroxine 50mcg daily, Metformin 1000mg BID, Pantoprazole 20mg daily, Tamsulosin 0.4mg QHS, Ferrous Sulfate 325mg daily, senna 8.6mg 2 tabs QHS.  PRN medications:  - Thorazine 50mg q6h PO/IM PRN for Agitation  - Albuterol for asthma  4. Labs ordered for am  5. Dispo planning per SW pending clinical improvement  
Pt is a 32 yo M, single, disabled, non-caregiver, resides at a [facility], with psych h/o moderate ID, ASD, impulse control disorder, factitious disorder, HAVEN, mood disorders, PTSD and ADHD, multiple past psych admissions (last known [facility] 12/19-12/23/21), numerous ED visits for both medical/psych complaints, longstanding h/o SIB (head banging, cutting), but no known SA, longstanding h/o endorsing SI, h/o aggression toward staff at , PMH significant for asthma, DM, urinary retention, GERD, BPH, hypothyroidism, HLD, HTN, and b/l myopia, on Depakote 500 mg BID, Guanfacine 2 mg, Risperdal 4 mg, and Remeron 15 mg, prescribed by his PCP, who presents to the ED for suicidal and homicidal thoughts to kill self and kill  staff.    On assessment, pt presents with concrete thought process.  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  He endorses that he would kick staff members or would stab himself.  Patient is unable to engage in safety planning.  Patient is unable to identify any specific trigger for why these thoughts have surfaced today. He reported that feel like his medications are not working. He endorses depressed mood, his affect is flat and nonreactive.  Denies manic symptoms.   Denies auditory or visual hallucinations, denies paranoid ideations. Denies alcohol/substance use.   Per collateral from group staff they have significant safety concerns, they are advocating for hospitalization, patient will be admitted involuntarily for safety, stabilization, and treatment.    Upon assessment, patient irritable and denies SI/HI/I/P or AH/VH or paranoia.        PLAN:   1. Admit to Low 6, 9.39  2. CO for safety due to SIB and aggression  3. Continue Home medications:  	Risperdal 4mg BID, Depakote 500mg BID, Remeron 15mg QHS, Buspar tapered to 5mg PO BID on 6/1 and discontinued after 2 days, add Neurontin and titrate to 400mg PO BID, Guanfacine 2mg daily in AM, Pravastatin 20mg daily, Loratadine 10mg daily, Levothyroxine 50mcg daily, Metformin 1000mg BID, Pantoprazole 20mg daily, Tamsulosin 0.4mg QHS, Ferrous Sulfate 325mg daily, senna 8.6mg 2 tabs QHS.  PRN medications:  - Thorazine 50mg q6h PO/IM PRN for Agitation  - Albuterol for asthma  4. Labs ordered for am  5. Dispo planning per  pending clinical improvement  symptom management, safety planning, care coordination  Pt is a 32 yo M, single, disabled, non-caregiver, resides at a [facility], with psych h/o moderate ID, ASD, impulse control disorder, factitious disorder, HAVEN, mood disorders, PTSD and ADHD, multiple past psych admissions (last known [facility] 12/19-12/23/21), numerous ED visits for both medical/psych complaints, longstanding h/o SIB (head banging, cutting), but no known SA, longstanding h/o endorsing SI, h/o aggression toward staff at , [facility] significant for asthma, DM, urinary retention, GERD, BPH, hypothyroidism, HLD, HTN, and b/l myopia, on Depakote 500 mg BID, Guanfacine 2 mg, Risperdal 4 mg, and Remeron 15 mg, prescribed by his PCP, who presents to the ED for suicidal and homicidal thoughts to kill self and kill  staff.    On assessment, pt presents with concrete thought process.  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  He endorses that he would kick staff members or would stab himself.  Patient is unable to engage in safety planning.  Patient is unable to identify any specific trigger for why these thoughts have surfaced today. He reported that feel like his medications are not working. He endorses depressed mood, his affect is flat and nonreactive.  Denies manic symptoms.   Denies auditory or visual hallucinations, denies paranoid ideations. Denies alcohol/substance use.   Per collateral from group staff they have significant safety concerns, they are advocating for hospitalization, patient will be admitted involuntarily for safety, stabilization, and treatment.    Upon assessment, patient endorses SI and HI towards staff at the group [place]. He is on CO for safety.    PLAN:   1. Admit to Low 6, 9.39  2. CO for safety  3. Continue Home medications:  	Risperdal 4mg BID, Depakote 500mg BID, Remeron 15mg QHS, Buspar 15mg QHS, Guanfacine 2mg daily in AM, Pravastatin 20mg daily, Loratadine 10mg daily, Levothyroxine 50mcg daily, Metformin 1000mg BID, Pantoprazole 20mg daily, Tamsulosin 0.4mg QHS, Ferrous Sulfate 325mg daily, senna 8.6mg 2 tabs QHS.  PRN medications:  - Thorazine 50mg q6h PO/IM PRN for Agitation  - Albuterol for asthma  4. Labs ordered for am  5. Dispo planning per  pending clinical improvement  
Pt is a 34 yo M, single, disabled, non-caregiver, resides at a [facility], with psych h/o moderate ID, ASD, impulse control disorder, factitious disorder, HAVEN, mood disorders, PTSD and ADHD, multiple past psych admissions (last known [facility] 12/19-12/23/21), numerous ED visits for both medical/psych complaints, longstanding h/o SIB (head banging, cutting), but no known SA, longstanding h/o endorsing SI, h/o aggression toward staff at , PMH significant for asthma, DM, urinary retention, GERD, BPH, hypothyroidism, HLD, HTN, and b/l myopia, on Depakote 500 mg BID, Guanfacine 2 mg, Risperdal 4 mg, and Remeron 15 mg, prescribed by his PCP, who presents to the ED for suicidal and homicidal thoughts to kill self and kill  staff.    On assessment, pt presents with concrete thought process.  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  He endorses that he would kick staff members or would stab himself.  Patient is unable to engage in safety planning.  Patient is unable to identify any specific trigger for why these thoughts have surfaced today. He reported that feel like his medications are not working. He endorses depressed mood, his affect is flat and nonreactive.  Denies manic symptoms.   Denies auditory or visual hallucinations, denies paranoid ideations. Denies alcohol/substance use.   Per collateral from group staff they have significant safety concerns, they are advocating for hospitalization, patient will be admitted involuntarily for safety, stabilization, and treatment.    Upon assessment, patient denies SI/HI/I/P or AH/VH or paranoia.  Pt eating and sleeping well.      PLAN:   1. Admit to Low 6, 9.39  2. CO for safety discontinued on 5/31 and patient placed on routine checks  3. Continue Home medications:  	Risperdal 4mg BID, Depakote 500mg BID, Remeron 15mg QHS, Buspar tapered to 5mg PO BID on 6/1 and discontinued after 2 days, add Neurontin 300mg PO BID, Guanfacine 2mg daily in AM, Pravastatin 20mg daily, Loratadine 10mg daily, Levothyroxine 50mcg daily, Metformin 1000mg BID, Pantoprazole 20mg daily, Tamsulosin 0.4mg QHS, Ferrous Sulfate 325mg daily, senna 8.6mg 2 tabs QHS.  PRN medications:  - Thorazine 50mg q6h PO/IM PRN for Agitation  - Albuterol for asthma  4. Labs ordered for am  5. Dispo planning per  pending clinical improvement  symptom management, safety planning, care coordination  Pt is a 34 yo M, single, disabled, non-caregiver, resides at a [facility], with psych h/o moderate ID, ASD, impulse control disorder, factitious disorder, HAVEN, mood disorders, PTSD and ADHD, multiple past psych admissions (last known [facility] 12/19-12/23/21), numerous ED visits for both medical/psych complaints, longstanding h/o SIB (head banging, cutting), but no known SA, longstanding h/o endorsing SI, h/o aggression toward staff at , [facility] significant for asthma, DM, urinary retention, GERD, BPH, hypothyroidism, HLD, HTN, and b/l myopia, on Depakote 500 mg BID, Guanfacine 2 mg, Risperdal 4 mg, and Remeron 15 mg, prescribed by his PCP, who presents to the ED for suicidal and homicidal thoughts to kill self and kill  staff.    On assessment, pt presents with concrete thought process.  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  He endorses that he would kick staff members or would stab himself.  Patient is unable to engage in safety planning.  Patient is unable to identify any specific trigger for why these thoughts have surfaced today. He reported that feel like his medications are not working. He endorses depressed mood, his affect is flat and nonreactive.  Denies manic symptoms.   Denies auditory or visual hallucinations, denies paranoid ideations. Denies alcohol/substance use.   Per collateral from group staff they have significant safety concerns, they are advocating for hospitalization, patient will be admitted involuntarily for safety, stabilization, and treatment.    Upon assessment, patient endorses SI and HI towards staff at the group [place]. He is on CO for safety.    PLAN:   1. Admit to Low 6, 9.39  2. CO for safety  3. Continue Home medications:  	Risperdal 4mg BID, Depakote 500mg BID, Remeron 15mg QHS, Buspar 15mg QHS, Guanfacine 2mg daily in AM, Pravastatin 20mg daily, Loratadine 10mg daily, Levothyroxine 50mcg daily, Metformin 1000mg BID, Pantoprazole 20mg daily, Tamsulosin 0.4mg QHS, Ferrous Sulfate 325mg daily, senna 8.6mg 2 tabs QHS.  PRN medications:  - Thorazine 50mg q6h PO/IM PRN for Agitation  - Albuterol for asthma  4. Labs ordered for am  5. Dispo planning per  pending clinical improvement

## 2023-06-05 NOTE — BH INPATIENT PSYCHIATRY DISCHARGE NOTE - OTHER PAST PSYCHIATRIC HISTORY (INCLUDE DETAILS REGARDING ONSET, COURSE OF ILLNESS, INPATIENT/OUTPATIENT TREATMENT)
Patient is a 34 yo male domiciled at residence, hx of autism, enrolled w/ [facility], BIB EMS activated by staff. Evening staff reports he gotten agitated and was walking around. When he gets angry/agitated he will punch the walls, has broke his arms in the past and has fought with the  and gets tased. Staff unsure what exactly the patient did today. He does not believe he hurt anyone today, unsure of current SI/HI but says patient has a hx of homicidal statements often and will say he wants to hurt the workers. Staff is unsure of any prior suicide attempts patient lives in a constant crisis and gets agitated easily. Staff reported patient has many ER visits and is unsure about patient’s last psych hospitalization. Staff reports the patient endorses hearing voices when not well and suspects he is currently hearing voices. Staff reports patient can go from “0-100”. Patient does not attend day program currently and reported in the past he would get into fights, has medical problems include diabetes. Patient is covid vaccinated and has no recent exposure. Staff reports patient does not have access to fire arms or weapons. Staff reports when the patient is not in a good mood he will go to the room and punch a wall or grab a pen. staff reporters patient will say he needs to go to the hospital when angry or when staff are struggling to redirect him.

## 2023-06-05 NOTE — BH INPATIENT PSYCHIATRY DISCHARGE NOTE - HOSPITAL COURSE
On the unit, patient was on CO 1:1 for SIB and aggression.  Per collateral from his mother, patient is on 1:1 at his residence 24/7 and patient cannot be around other peers because he gets upset.  Pt endorsed SI and HI in the beginning of his hospitalization, but a few days later consistently denied it.  Pt was tapered off Buspar and Neurontin was added and titrated to 400mg PO BID.  On 6/2, patient was agitated due to another peer being mean to him and started hitting himself and threatening staff, so he required emergency IM medication.  After that incident, patient was in behavioral control on the unit and reported feeling ready to go home.      On the days leading to discharge, pt denied SI/HI/I/P or AH/VH or paranoia.  Pt eating and sleeping well.  Pt endorsed motivation to continue medication as prescribed and engage in outpatient treatment.  Safety planning done with patient and patient agreed to call 911 or go to the nearest ED if he finds himself a danger to himself or others.  Suicide hotline information give to patient with discharge documentation.    On the unit, patient was on CO 1:1 for SIB and aggression.  Per collateral from his mother, patient is on 1:1 at his residence 24/7 and patient cannot be around other peers because he gets upset.  Pt endorsed SI and HI in the beginning of his hospitalization, but a few days later consistently denied it and reported he never had those thoughts.  Pt was tapered off Buspar and Neurontin was added and titrated to 400mg PO BID.  On 6/2, patient was agitated due to another peer being mean to him and started hitting himself and threatening staff, so he required emergency IM medication.  During that incident, patient did not present with psychotic sx and his behavior was due to his poor frustration tolerance from his intellectual disability.  After that incident, patient was in behavioral control on the unit and reported feeling ready to go home.      On the days leading to discharge, pt denied SI/HI/I/P or AH/VH or paranoia.  Pt eating and sleeping well.  Pt endorsed motivation to continue medication as prescribed and engage in outpatient treatment.  Safety planning done with patient and patient agreed to call 911 or go to the nearest ED if he finds himself a danger to himself or others.  Suicide hotline information give to patient with discharge documentation.    On the unit, patient was on CO 1:1 for SIB and aggression.  Per collateral from his mother, patient is on 1:1 at his residence 24/7 and patient cannot be around other peers as it triggers him at times.  Pt endorsed SI and HI in the beginning of his hospitalization, but a few days later consistently denied it and reported he never had those thoughts.  Pt was tapered off Buspar and Neurontin was added and titrated to 400mg PO BID.  On 6/2, patient was agitated due to another peer being mean to him and started hitting himself and threatening staff, so he required emergency IM medication.  During that incident, patient did not present with psychotic sx and his behavior was due to his poor frustration tolerance from his intellectual disability.  After that incident, patient was in behavioral control on the unit, visible engaging appropriately with other peers, and reported feeling ready to go home.      On the days leading to discharge, pt denied SI/HI/I/P or AH/VH or paranoia.  Pt eating and sleeping well.  Pt endorsed motivation to continue medication as prescribed and engage in outpatient treatment.  Safety planning done with patient and patient agreed to call 911 or go to the nearest ED if he finds himself a danger to himself or others.  Suicide hotline information give to patient with discharge documentation.

## 2023-06-05 NOTE — BH INPATIENT PSYCHIATRY DISCHARGE NOTE - GENERAL APPEARANCE
Call to pt to see about setting up port placement per Dr Lehman in gastro dept. Pt states that she just received a new PICC line yesterday, and not sure what I am talking about for a port to be placed. Pt will call Dr Lehman's office to discuss with him further. Pt provided with my direct number to call if indeed a port needed.  
Developmentally delayed

## 2023-06-05 NOTE — BH INPATIENT PSYCHIATRY PROGRESS NOTE - NSBHMSEMUSCLE_PSY_A_CORE
Normal muscle tone/strength
Unable to assess
Unable to assess
Normal muscle tone/strength

## 2023-06-05 NOTE — BH INPATIENT PSYCHIATRY DISCHARGE NOTE - NSBHFUPINTERVALHXFT_PSY_A_CORE
Pt compliant with medication and tolerating it well.  Chart reviewed and case discussed with treatment team.  No events reported overnight.  Pt maintained on CO 1:1 for SIB.  Pt reports he feels ready to go home.  Pt denies SI/HI/I/P or AH/VH or paranoia.  Pt eating and sleeping well.  Pt endorses motivation to continue medication as prescribed and engage in outpatient care.  Pt reports his aide gives him his medication at home and he agrees to continue to allow them to do so.

## 2023-06-05 NOTE — BH INPATIENT PSYCHIATRY PROGRESS NOTE - NSBHCONSBHPROVCNTCTNOFT_PSY_A_CORE
defer to primary team

## 2023-06-05 NOTE — BH INPATIENT PSYCHIATRY PROGRESS NOTE - NSICDXBHPRIMARYDX_PSY_ALL_CORE
MDD (major depressive disorder), recurrent episode   F33.9  

## 2023-06-05 NOTE — HISTORY OF PRESENT ILLNESS
[Disease: _____________________] : Disease: [unfilled] [T: ___] : T[unfilled] [N: ___] : N[unfilled] [M: ___] : M[unfilled] [AJCC Stage: ____] : AJCC Stage: [unfilled] [de-identified] : Markus Bazan 32 years old male with history of autism who initially presented left testicle pain and swelling on April 2022, US testicle showed a 2.7cm mass in the left testicle\par again on 9/26 on ED and admitted for left testicle pain and swelling \par \par US showed 4.0cm lesion\par CT abdomen and pelvis post orchiectomy showed postsurgical changes\par \par 9/25  hCG<1, AFP 3.3 and \par \par 9/27 left radical  orchiectomy, pathology showed seminoma, tumor invades rete testis, margins negative, LVI negative, pT1b tumor limited to the testis, pT1b\par \par  [de-identified] : seminoma  [de-identified] : 11/4/22 report burning while urinating, frequency \par \par 12/16/22 report burning while urinating and frequency. Denies abdominal pain. States sometimes difficulty urination, however, bedwetting occurs every other night including last night. \par \par 1/11/23 went to ED last night [facility] because of chest tightness and wheezing with history of asthma. Reports burning urination, pending UA at PCP, no fever. . \par \par 1/30/23 CT abdomen and pelvis showed No evidence of lymphadenopathy. Moderate hepatic steatosis. Trace residual bilateral layering pleural effusions.\par \par 3/3/23 reports some lower abdominal skin pain by touching. however, no rash and blister.  [Date: ____________] : Patient's last distress assessment performed on [unfilled]. [3 - Distress Level] : Distress Level: 3

## 2023-06-05 NOTE — BH INPATIENT PSYCHIATRY PROGRESS NOTE - NSTXCOPEDATEEST_PSY_ALL_CORE
31-May-2023
27-May-2023
27-May-2023
31-May-2023
27-May-2023
31-May-2023

## 2023-06-05 NOTE — BH INPATIENT PSYCHIATRY PROGRESS NOTE - NSBHMSETHTCONTENT_PSY_A_CORE
Unremarkable
Suicidality/Homicidality
Suicidality/Homicidality

## 2023-06-05 NOTE — BH INPATIENT PSYCHIATRY DISCHARGE NOTE - NSDCMRMEDTOKEN_GEN_ALL_CORE_FT
albuterol 90 mcg/inh inhalation aerosol: 2 puff(s) inhaled every 6 hours, As needed, Shortness of Breath and/or Wheezing  amoxicillin-clavulanate 875 mg-125 mg oral tablet: 1 tab(s) orally 2 times a day  busPIRone 15 mg oral tablet: 1 tab(s) orally every 12 hours  cephalexin 500 mg oral tablet: 1 tab(s) orally 4 times a day   divalproex sodium 500 mg oral tablet, extended release: 1 tab(s) orally 2 times a day  ferrous sulfate 325 mg (65 mg elemental iron) oral tablet: 1 tab(s) orally once a day  guanFACINE 2 mg oral tablet: 1 tab(s) orally once a day  ibuprofen 600 mg oral tablet: 1 tab(s) orally every 6 hours   ibuprofen 600 mg oral tablet: 1 tab(s) orally every 6 hours   ketoconazole 2% topical cream: Apply topically to affected area once a day   levothyroxine 50 mcg (0.05 mg) oral tablet: 1 tab(s) orally once a day in AM  loperamide 2 mg oral tablet, chewable: 1 tab(s) chewed 2 times a day, As Needed -for diarrhea   LORazepam 1 mg oral tablet: 1 tab(s) orally once a day (at bedtime)  MiraLax oral powder for reconstitution: 17 gram(s) orally once a day, As Needed -for congestion - for constipation   mirtazapine 15 mg oral tablet: 1 tab(s) orally once a day (at bedtime)  mupirocin 2% topical cream: Apply topically to affected area 2 times a day  ondansetron 4 mg oral tablet, disintegratin tab(s) orally 3 times a day  as needed for nausea   oxyCODONE 5 mg oral tablet: 1 tab(s) orally every 6 hours MDD:4 tabs  oxycodone-acetaminophen 5 mg-325 mg oral tablet: 1 tab(s) orally every 6 hours, As Needed -Moderate Pain (4 - 6) MDD:4  pantoprazole 20 mg oral delayed release tablet: 1 tab(s) orally once a day  pravastatin 20 mg oral tablet: 1 tab(s) orally once a day  risperiDONE 4 mg oral tablet: 1 tab(s) orally 2 times a day  senna oral tablet: 2 tab(s) orally once a day (at bedtime)   tamsulosin 0.4 mg oral capsule: 1 cap(s) orally once a day (at bedtime)   atorvastatin 10 mg oral tablet: 1 tab(s) orally once a day (at bedtime)  divalproex sodium 500 mg oral delayed release tablet: 1 tab(s) orally 2 times a day  ferrous sulfate 325 mg (65 mg elemental iron) oral tablet: 1 tab(s) orally once a day (at bedtime)  gabapentin 400 mg oral capsule: 1 cap(s) orally 2 times a day  guanFACINE 2 mg oral tablet: 1 tab(s) orally once a day  levothyroxine 50 mcg (0.05 mg) oral tablet: 1 tab(s) orally once a day  loratadine 10 mg oral tablet: 1 tab(s) orally once a day  Melatonin 5 mg oral tablet: 1 tab(s) orally once a day (at bedtime)  metFORMIN 1000 mg oral tablet: 1 tab(s) orally 2 times a day (with meals)  mirtazapine 15 mg oral tablet: 1 tab(s) orally once a day (at bedtime)  pantoprazole 40 mg oral delayed release tablet: 1 tab(s) orally once a day  risperiDONE 4 mg oral tablet: 1 tab(s) orally 2 times a day  senna leaf extract oral tablet: 2 tab(s) orally once a day (at bedtime)  tamsulosin 0.4 mg oral capsule: 1 cap(s) orally once a day (at bedtime)

## 2023-06-05 NOTE — BH INPATIENT PSYCHIATRY PROGRESS NOTE - NSTXSUICIDDATEEST_PSY_ALL_CORE
31-May-2023
27-May-2023
31-May-2023
27-May-2023
31-May-2023
27-May-2023
31-May-2023

## 2023-06-05 NOTE — BH INPATIENT PSYCHIATRY DISCHARGE NOTE - NSBHDCMEDICALFT_PSY_A_CORE
Pt fell off his bed during his hospitalization and landed on his left arm and c/o pain on 5/31.  Pt sent to [facility] ED by medical PA for left wrist and forearm x-ray which showed no sign of fracture.  Pt sent back with ace bandage on left forearm and hand, but he took it off and refused to wear it.

## 2023-06-05 NOTE — BH INPATIENT PSYCHIATRY DISCHARGE NOTE - NSBHMETABOLIC_PSY_ALL_CORE_FT
BMI: BMI (kg/m2): 38.4 (06-01-23 @ 23:35)  HbA1c: A1C with Estimated Average Glucose Result: 6.5 % (05-31-23 @ 09:26)    Glucose: POCT Blood Glucose.: 136 mg/dL (06-02-23 @ 02:09)    BP: 115/76 (06-05-23 @ 06:36) (100/70 - 115/76)  Lipid Panel: Date/Time: 05-31-23 @ 09:26  Cholesterol, Serum: 128  Direct LDL: --  HDL Cholesterol, Serum: 31  Total Cholesterol/HDL Ration Measurement: --  Triglycerides, Serum: 157

## 2023-06-05 NOTE — BH INPATIENT PSYCHIATRY PROGRESS NOTE - NSTXSUICIDGOAL_PSY_ALL_CORE
Patient:   DESHAUN ISBELL            MRN: [id number]            FIN: [id number]              Age:   26 years     Sex:  FEMALE     :  [birth year]   Associated Diagnoses:   Hives   Author:   MATEO, CHRISTINA     Addendum     Teaching-Supervisory Addendum-Brief   I participated in the following activities of this patients care: the medical history, the physical exam, medical decision making.  I personally performed: supervision of the patient's care, the medical decision making.  The case was discussed with: the resident.   Procedures: I was present for key portions of the procedure and was immediately available for the non-key portions.  Notes: Pt is 25 y/o female w/ itchy rash 2 days, hives, no wheeizng, no fever,s no cough, no N/V/D, no other complaints.  ROS:  Gen:  no fevers  HEENT:  no nosebleed, no ear pain, no sore throat, no visual changes, no HA  Neck:  no LAD, no neck pain  CV:  no  CP, no SOB, no edema, no orthopnea  Resp:  no cough, no SOB  Abd:  no pain, no N/V/D  Back:  no pain, no flank pain  Ext: no pain, no weakness, no bruising  Skin:  + itching,+  rash, no burns  Neuro:  no HA, no focal weakness, no numbness, no tingling  Psych:  no SI, no HI, no AVH  Physical Exam:  Gen:  AAO, NAD  HEENT:  NC/AT/EOMI B/PERRLA B/MMs moist/throat clear/TMs clear B  Neck:  supple, NT, no JVD  CV:  S1, S2, no M/G/R  Resp:  CTA B  Abd:  S/NT/ND/NABS  Back:  nontender  Ext:  no C/C/E  Skin:  brisk cap refill, good turgor, + hives scattered  Neuro:  CN 2-12 normal B, strength 5/5 t/o  Impression:  25 y/o female w/ hives, will DC home.  Diagnosis:  Hives  In all patients with URI symptoms, fevers, chills, or any respiratory related complaints, with or without concern or known exposure to another person with known history or exposure to Covid-19, a mask with face shield, N95, and goggles, gown, and double gloves were worn as appropriate personal protective equipment. In any patient with concern for exposure, known 
exposure, or any symptoms and appropriate concern for symptoms of Covid-19, all  appropriate personal protective equipment was worn. I did this for all entries into this patient's room. With the current level of readiness and concern for Covid-19 I am wearing this equipment for all patients.  **This patient was seen during the worldwide COVID-19 pandemic.**  This care is provided during an unprecedented national emergency due to Novel Coronavirus (COVID-19).  COVID-19 infections and transmission risks place heavy strains on healthcare resources.  As this pandemic evolves, the hospital and providers strive to respond fluidly, to remain operational and to provide care relative to the available resources and information.  Outcomes therefore are unpredictable and treatments are without well-defined  guidelines.  Further, the impact on COVID-19 on all aspects of emergency care, including the impact to patients seeking care for reasons other than COVID-19, is unavoidable during this national emergency.  .     Review of Systems   Constitutional symptoms:  No fever,    Skin symptoms:  Rash, pruritus.    Eye symptoms:  No recent vision problems,    ENMT symptoms:  No sore throat, no nasal congestion.    Respiratory symptoms:  No shortness of breath, no cough.    Cardiovascular symptoms:  No chest pain,    Gastrointestinal symptoms:  No abdominal pain, no nausea, no vomiting.   Genitourinary symptoms:  No dysuria,    Musculoskeletal symptoms:  No back pain,    Neurologic symptoms:  No headache,    Psychiatric symptoms:  No anxiety,    Endocrine symptoms:  No polyuria,    Hematologic/Lymphatic symptoms:  Bleeding tendency negative,    Allergy/immunologic symptoms:  No seasonal allergies,              Additional review of systems information: All other systems reviewed and otherwise negative.     Physical Examination              Vital Signs   ED Vital Sign   08/26/20 03:21 CDT Temperature - VS 36.3 deg_C  Normal    Temperature 
Be able to state 3 reasons for living
Source - VS Temporal    Heart/Pulse Rate 69  Normal    Pulse Source Monitor    Respiration Rate 14 breaths/min  Normal    SpO2 99 %  Normal    NIBP Systolic 124  Normal    NIBP Diastolic 87  Normal    NIBP MAP 99  Normal   .   Height and Weight   08/26/20 04:21 CDT CLINICALHEIGHT 155 cm    08/26/20 04:14 CDT CLINICALHEIGHT In Error cm  Normal (In Error)   08/26/20 03:21 CDT CLINICALWEIGHT 53.3 kg     CLINICALHEIGHT 155 cm     Height Method Stated     BMI-Medical History 22.2 kg/m2    .     Medical Decision Making   Documents reviewed:  Emergency department nurses' notes.     Reexamination/ Reevaluation   Course: improving.   Assessment: exam improved.     Impression and Plan   Diagnosis   Hives (NEZ32-SE L50.9, Discharge, Emergency medicine, Medical)   Plan   Condition: Improved, Stable.    Disposition: Medically cleared, Discharged: Time  08/26/20 05:01:00, to home.   Prescriptions: Launch prescriptions   Pharmacy:  predniSONE oral 50 mg tablet (Prescribe): 50 mg = 1 tab, Oral, Daily, X 4 days, # 4 tab, 0 Refills, Maintenance.   Patient was given the following educational materials: Hives, Easy-to-Read, Hives, Easy-to-Read.   Follow up with: BASIL MONTANEZ Within 3 to 5 days unless otherwise instructed.   Counseled: Patient, Regarding diagnosis, Regarding diagnostic results, Regarding treatment plan, Regarding prescription, Patient indicated understanding of instructions.  
Will verbalize a decrease in preoccupation with suicidal thoughts and / or intent to commit suicide to 2 on a 10-point scale
Be able to state 3 reasons for living
Will verbalize a decrease in preoccupation with suicidal thoughts and / or intent to commit suicide to 2 on a 10-point scale
Will verbalize a decrease in preoccupation with suicidal thoughts and / or intent to commit suicide to 2 on a 10-point scale
Be able to state 3 reasons for living

## 2023-06-05 NOTE — BH INPATIENT PSYCHIATRY PROGRESS NOTE - NSBHFUPINTERVALHXFT_PSY_A_CORE
Pt compliant with medication and tolerating it well.  Chart reviewed and case discussed with treatment team.  No events reported overnight.  Pt maintained on CO 1:1 for SIB.  Pt reports he feels ready to go home and is happy that the treatment team is discharging him tomorrow.  Pt reports he got his check today, it is in his bank account and he is going to save up to buy something.  Pt without SI/HI/I/P or AH/VH or paranoia.  Pt eating and sleeping well.
Pt compliant with medication with encouragement.  Chart reviewed and case discussed with nursing.  Per report, patient received IM medication last night as he was agitated, hitting himself and threatening staff.  Pt also took of his ace bandage and refused to put it back on when writer recommended doing so.  CO 1:1 changed to aggressive and SIB.  Pt irritable today, reports he acted out because another peer was mean to him.  Pt denies SI/HI/I/P or AH/VH or paranoia.  Pt with poor sleep, but eating well.  Pt denies pain.
Pt compliant with medication and tolerating it well.  Chart reviewed and case discussed with treatment team.  No events reported overnight.  Pt maintained on CO 1:1 for suicidal and SIB.  Pt reports he has been having "suicidal ideation and homicidal."  Pt denies any I/P.   Pt reports he feels his medications are not helping and he only takes them sometimes.  Pt reports his "psych meds make me have an anxiety attack."  Pt reports he does not know the names of his medication.  Pt denies AH/VH or paranoia.  Pt reports poor sleep and reports he does not like the hospital food but is eating.
Pt compliant with medication and tolerating it well.  Chart reviewed and case discussed with treatment team.  No events reported overnight.  Pt denies SI/HI/I/P or AH/VH or paranoia.  Pt eating and sleeping well.  Pt reports he had an episode of nocturnal enuresis last night which is common for him.  He reports he got up and showered then went back to bed.  Pt encouraged to use the restroom before bed.  Pt today reports he wants to stay in the hospital longer, "3 weeks."
Pt compliant with medication and tolerating it well.  Chart reviewed and case discussed with treatment team.  No events reported overnight.  Pt on CO 1:1 for suicidal and SIB.  Pt reports he is in a good mood today.  Pt denies SI/HI/I/P, reports he never had those thoughts.  Pt denies AH/VH or paranoia.  Pt reports eating and sleeping well.  Pt reports he wants to go home.  Pt asks to come of CO 1:1 and contracts for safety, reports he will come to staff if he has thoughts to hurt himself or others.
Pt compliant with medication and tolerating it well.  Chart reviewed, no events reported overnight.  Pt maintained on CO 1:1 for SIB.  Pt denies SI/HI/I/P or AH/VH or paranoia.  Pt eating and sleeping well.
Chart reviewed and case discussed with treatment team. No events reported overnight. Sleep and appetite is fair. Patient was out on the unit and keeping to himself. He denies any current AVH or SI/HI. Patient is compliant with medications, no adverse effects reported.  
Chart reviewed and case discussed with treatment team. No events reported overnight. Sleep and appetite is fair. Patient was isolative to his room and sleeping this morning. Patient was minimally cooperative. Denies any AVH or SI/HI. Patient is compliant with medications, no adverse effects reported.  
Pt compliant with medication and tolerating it well.  Chart reviewed and case discussed with treatment team.  Per report, pt rolled out of bed onto the floor last night and sustained injury and pain to L forearm.  Pt was transferred to [facility] ED by medical PA, xrays did not show fracture.  Pt returned with ace bandage applied and Tylenol and Motrin PO PRN recommended for pain.  Pt placed on CO 1:1 by CAROL due to ligature risk from ace bandage.  On interview, patient denies pain, reports he fell out of bed onto his arm.  Pt asks why he is on CO 1:1 again and it was explained to him.  Pt agrees to keep ace bandage in place.  Pt denies SI/HI/I/P or AH/VH or paranoia.  Pt eating well, but had poor sleep due to being in the ED late.  Pt reports he wants to go home soon.

## 2023-06-05 NOTE — BH INPATIENT PSYCHIATRY PROGRESS NOTE - NSBHMSELANG_PSY_A_CORE
No abnormalities noted
No abnormalities noted
Silver Nitrate Text: The wound bed was treated with silver nitrate after the biopsy was performed.
No abnormalities noted

## 2023-06-05 NOTE — BH INPATIENT PSYCHIATRY DISCHARGE NOTE - HPI (INCLUDE ILLNESS QUALITY, SEVERITY, DURATION, TIMING, CONTEXT, MODIFYING FACTORS, ASSOCIATED SIGNS AND SYMPTOMS)
As per [facility] ED Assessment:   "Pt is a 32 yo M, single, disabled, non-caregiver, resides at a [facility], with psych h/o moderate ID, ASD, impulse control disorder, factitious disorder, HAVEN, mood disorders, PTSD and ADHD, multiple past psych admissions (last known [facility] 12/19-12/23/21), numerous ED visits for both medical/psych complaints, longstanding h/o SIB (head banging, cutting), but no known SA, longstanding h/o endorsing SI, h/o aggression toward staff at , PMH significant for asthma, DM, urinary retention, GERD, BPH, hypothyroidism, HLD, HTN, and b/l myopia, on Depakote 500 mg BID, Guanfacine 2 mg, Risperdal 4 mg, and Remeron 15 mg, prescribed by his PCP, who presents to the ED for suicidal and homicidal thoughts to kill self and kill  staff.      On assessment, pt presents with concrete thought process.  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  He endorses that he would kick staff members or would stab himself.  Patient is unable to engage in safety planning.  Patient is unable to identify any specific trigger for why these thoughts have surfaced today. He reported that feel like his medications are not working. He endorses depressed mood, his affect is flat and nonreactive.  Denies manic symptoms.   Denies auditory or visual hallucinations, denies paranoid ideations. Denies alcohol/substance use.     KEKE contacted staff at his group home for collateral ([facility]  [phone]). KEKE spoke w/ [facility] overnight staff Fredo who provided the following information: "Patient is a 32 yo male domiciled at residence, hx of autism,  enrolled w/ [employer], bib EMS activated  by staff. Evening staff reports he gotten agitated and was walking around. When he gets angry/agitated he will punch the walls, has broke his arms in the past and has fought with the  and gets tased. Fredo unsure what exactly the patient did today. He does not believe he hurt anyone today. He is unsure of nay current si or hi but says patient has a hx of homicidal statements often and will say he wants to hurt the workers often. He is unsure of any prior suicide attempts. He reports patient lives in a constant crisis and gets agitated easily. He says patient has many ER vsiits and he is unsure about patient’s last psych hospitalization. He says the patient endorses hearing voices when he is not doing well and suspects he is currently hearing voices. He reports patient is not on a 1 to 1 at the residence. He says the patient can go from “0-100” often. Patient does not attend day program currently and says in the pst he would get into fights. He reports medical problems include diabetes. Patient is covid vaccinated and has no recent trave or exposure. He reports patient does not have access to fire arms or weapons. He reports when the patient is not in a good mood he will go to the room and punch a wall or grab a pen. He says the patient will say he needs to go to the hospital when angry and when staff are struggling to redirect him."    Upon assessment on the unit, patient is calm and cooperative but oddly related. He is smiling inappropriately while endorsing SI and HI towards his aide. Patient endorses +AH "sometimes" but is unable to further elaborate. He stated that he had SI with plan to stab himself. Patient also reported drinking beer and vaping but couldn't provide further details. Patient is child-like and is a poor historian. He is focused on going to a new group home and starting a diet while hospitalized. Patient is on CO and home medications continued.

## 2023-06-05 NOTE — BH INPATIENT PSYCHIATRY PROGRESS NOTE - NSTXSLPPATGOAL_PSY_ALL_CORE
Be able to sleep for a minimum of six hours daily
Utilize relaxation techniques to aid in sleeping

## 2023-06-05 NOTE — BH INPATIENT PSYCHIATRY PROGRESS NOTE - NSBHMSEMOVE_PSY_A_CORE
No abnormal movements
Unable to assess
No abnormal movements

## 2023-06-05 NOTE — BH INPATIENT PSYCHIATRY PROGRESS NOTE - CURRENT MEDICATION
MEDICATIONS  (STANDING):  atorvastatin 10 milliGRAM(s) Oral at bedtime  busPIRone 5 milliGRAM(s) Oral two times a day  divalproex  milliGRAM(s) Oral two times a day  ferrous    sulfate 325 milliGRAM(s) Oral at bedtime  gabapentin 300 milliGRAM(s) Oral two times a day  levothyroxine 50 MICROGram(s) Oral daily  loratadine 10 milliGRAM(s) Oral daily  melatonin. 5 milliGRAM(s) Oral at bedtime  metFORMIN 1000 milliGRAM(s) Oral two times a day with meals  mirtazapine 15 milliGRAM(s) Oral at bedtime  pantoprazole    Tablet 20 milliGRAM(s) Oral before breakfast  risperiDONE   Tablet 4 milliGRAM(s) Oral two times a day  senna 2 Tablet(s) Oral at bedtime  tamsulosin 0.4 milliGRAM(s) Oral at bedtime    MEDICATIONS  (PRN):  albuterol    90 MICROgram(s) HFA Inhaler 2 Puff(s) Inhalation every 6 hours PRN Shortness of Breath  bacitracin   Ointment 1 Application(s) Topical three times a day PRN Rash  chlorproMAZINE    Injectable 75 milliGRAM(s) IntraMuscular once PRN agitation  chlorproMAZINE    Tablet 50 milliGRAM(s) Oral every 4 hours PRN agitation  
MEDICATIONS  (STANDING):  atorvastatin 10 milliGRAM(s) Oral at bedtime  busPIRone 10 milliGRAM(s) Oral two times a day  divalproex  milliGRAM(s) Oral two times a day  ferrous    sulfate 325 milliGRAM(s) Oral daily  gabapentin 300 milliGRAM(s) Oral two times a day  levothyroxine 50 MICROGram(s) Oral daily  loratadine 10 milliGRAM(s) Oral daily  melatonin. 5 milliGRAM(s) Oral at bedtime  metFORMIN 1000 milliGRAM(s) Oral two times a day with meals  mirtazapine 15 milliGRAM(s) Oral at bedtime  pantoprazole    Tablet 20 milliGRAM(s) Oral before breakfast  risperiDONE   Tablet 4 milliGRAM(s) Oral two times a day  senna 2 Tablet(s) Oral at bedtime  tamsulosin 0.4 milliGRAM(s) Oral at bedtime    MEDICATIONS  (PRN):  albuterol    90 MICROgram(s) HFA Inhaler 2 Puff(s) Inhalation every 6 hours PRN Shortness of Breath  bacitracin   Ointment 1 Application(s) Topical three times a day PRN Rash  chlorproMAZINE    Injectable 75 milliGRAM(s) IntraMuscular once PRN agitation  chlorproMAZINE    Tablet 50 milliGRAM(s) Oral every 4 hours PRN agitation  
MEDICATIONS  (STANDING):  atorvastatin 10 milliGRAM(s) Oral at bedtime  busPIRone 10 milliGRAM(s) Oral two times a day  divalproex  milliGRAM(s) Oral two times a day  ferrous    sulfate 325 milliGRAM(s) Oral daily  gabapentin 300 milliGRAM(s) Oral two times a day  levothyroxine 50 MICROGram(s) Oral daily  loratadine 10 milliGRAM(s) Oral daily  melatonin. 5 milliGRAM(s) Oral at bedtime  metFORMIN 1000 milliGRAM(s) Oral two times a day with meals  mirtazapine 15 milliGRAM(s) Oral at bedtime  pantoprazole    Tablet 20 milliGRAM(s) Oral before breakfast  risperiDONE   Tablet 4 milliGRAM(s) Oral two times a day  senna 2 Tablet(s) Oral at bedtime  tamsulosin 0.4 milliGRAM(s) Oral at bedtime    MEDICATIONS  (PRN):  albuterol    90 MICROgram(s) HFA Inhaler 2 Puff(s) Inhalation every 6 hours PRN Shortness of Breath  bacitracin   Ointment 1 Application(s) Topical three times a day PRN Rash  chlorproMAZINE    Injectable 50 milliGRAM(s) IntraMuscular once PRN Agitation  chlorproMAZINE    Tablet 50 milliGRAM(s) Oral every 6 hours PRN Agitation  
MEDICATIONS  (STANDING):  atorvastatin 10 milliGRAM(s) Oral at bedtime  busPIRone 15 milliGRAM(s) Oral two times a day  divalproex  milliGRAM(s) Oral two times a day  ferrous    sulfate 325 milliGRAM(s) Oral daily  levothyroxine 50 MICROGram(s) Oral daily  loratadine 10 milliGRAM(s) Oral daily  melatonin. 5 milliGRAM(s) Oral at bedtime  metFORMIN 1000 milliGRAM(s) Oral two times a day with meals  mirtazapine 15 milliGRAM(s) Oral at bedtime  pantoprazole    Tablet 20 milliGRAM(s) Oral before breakfast  risperiDONE   Tablet 4 milliGRAM(s) Oral two times a day  senna 2 Tablet(s) Oral at bedtime  tamsulosin 0.4 milliGRAM(s) Oral at bedtime    MEDICATIONS  (PRN):  albuterol    90 MICROgram(s) HFA Inhaler 2 Puff(s) Inhalation every 6 hours PRN Shortness of Breath  bacitracin   Ointment 1 Application(s) Topical three times a day PRN Rash  chlorproMAZINE    Injectable 50 milliGRAM(s) IntraMuscular once PRN Agitation  chlorproMAZINE    Tablet 50 milliGRAM(s) Oral every 6 hours PRN Agitation  
MEDICATIONS  (STANDING):  atorvastatin 10 milliGRAM(s) Oral at bedtime  busPIRone 15 milliGRAM(s) Oral two times a day  divalproex  milliGRAM(s) Oral two times a day  ferrous    sulfate 325 milliGRAM(s) Oral daily  levothyroxine 50 MICROGram(s) Oral daily  loratadine 10 milliGRAM(s) Oral daily  melatonin. 5 milliGRAM(s) Oral at bedtime  metFORMIN 1000 milliGRAM(s) Oral two times a day with meals  mirtazapine 15 milliGRAM(s) Oral at bedtime  pantoprazole    Tablet 20 milliGRAM(s) Oral before breakfast  risperiDONE   Tablet 4 milliGRAM(s) Oral two times a day  senna 2 Tablet(s) Oral at bedtime  tamsulosin 0.4 milliGRAM(s) Oral at bedtime    MEDICATIONS  (PRN):  albuterol    90 MICROgram(s) HFA Inhaler 2 Puff(s) Inhalation every 6 hours PRN Shortness of Breath  bacitracin   Ointment 1 Application(s) Topical three times a day PRN Rash  chlorproMAZINE    Injectable 50 milliGRAM(s) IntraMuscular once PRN Agitation  chlorproMAZINE    Tablet 50 milliGRAM(s) Oral every 6 hours PRN Agitation  
MEDICATIONS  (STANDING):  atorvastatin 10 milliGRAM(s) Oral at bedtime  divalproex  milliGRAM(s) Oral two times a day  ferrous    sulfate 325 milliGRAM(s) Oral at bedtime  gabapentin 400 milliGRAM(s) Oral two times a day  levothyroxine 50 MICROGram(s) Oral daily  loratadine 10 milliGRAM(s) Oral daily  melatonin. 5 milliGRAM(s) Oral at bedtime  metFORMIN 1000 milliGRAM(s) Oral two times a day with meals  mirtazapine 15 milliGRAM(s) Oral at bedtime  pantoprazole    Tablet 20 milliGRAM(s) Oral before breakfast  risperiDONE   Tablet 4 milliGRAM(s) Oral two times a day  senna 2 Tablet(s) Oral at bedtime  tamsulosin 0.4 milliGRAM(s) Oral at bedtime    MEDICATIONS  (PRN):  acetaminophen     Tablet .. 650 milliGRAM(s) Oral every 6 hours PRN Mild Pain (1 - 3), Moderate Pain (4 - 6)  albuterol    90 MICROgram(s) HFA Inhaler 2 Puff(s) Inhalation every 6 hours PRN Shortness of Breath  bacitracin   Ointment 1 Application(s) Topical three times a day PRN Rash  chlorproMAZINE    Injectable 75 milliGRAM(s) IntraMuscular once PRN agitation  chlorproMAZINE    Tablet 50 milliGRAM(s) Oral every 4 hours PRN agitation  ibuprofen  Tablet. 400 milliGRAM(s) Oral every 6 hours PRN Mild Pain (1 - 3), Moderate Pain (4 - 6)  
MEDICATIONS  (STANDING):  atorvastatin 10 milliGRAM(s) Oral at bedtime  busPIRone 5 milliGRAM(s) Oral two times a day  divalproex  milliGRAM(s) Oral two times a day  ferrous    sulfate 325 milliGRAM(s) Oral at bedtime  gabapentin 300 milliGRAM(s) Oral two times a day  levothyroxine 50 MICROGram(s) Oral daily  loratadine 10 milliGRAM(s) Oral daily  melatonin. 5 milliGRAM(s) Oral at bedtime  metFORMIN 1000 milliGRAM(s) Oral two times a day with meals  mirtazapine 15 milliGRAM(s) Oral at bedtime  pantoprazole    Tablet 20 milliGRAM(s) Oral before breakfast  risperiDONE   Tablet 4 milliGRAM(s) Oral two times a day  senna 2 Tablet(s) Oral at bedtime  tamsulosin 0.4 milliGRAM(s) Oral at bedtime    MEDICATIONS  (PRN):  acetaminophen     Tablet .. 650 milliGRAM(s) Oral every 6 hours PRN Mild Pain (1 - 3), Moderate Pain (4 - 6)  albuterol    90 MICROgram(s) HFA Inhaler 2 Puff(s) Inhalation every 6 hours PRN Shortness of Breath  bacitracin   Ointment 1 Application(s) Topical three times a day PRN Rash  chlorproMAZINE    Injectable 75 milliGRAM(s) IntraMuscular once PRN agitation  chlorproMAZINE    Tablet 50 milliGRAM(s) Oral every 4 hours PRN agitation  ibuprofen  Tablet. 400 milliGRAM(s) Oral every 6 hours PRN Mild Pain (1 - 3), Moderate Pain (4 - 6)  
MEDICATIONS  (STANDING):  atorvastatin 10 milliGRAM(s) Oral at bedtime  divalproex  milliGRAM(s) Oral two times a day  ferrous    sulfate 325 milliGRAM(s) Oral at bedtime  gabapentin 400 milliGRAM(s) Oral two times a day  levothyroxine 50 MICROGram(s) Oral daily  loratadine 10 milliGRAM(s) Oral daily  melatonin. 5 milliGRAM(s) Oral at bedtime  metFORMIN 1000 milliGRAM(s) Oral two times a day with meals  mirtazapine 15 milliGRAM(s) Oral at bedtime  pantoprazole    Tablet 20 milliGRAM(s) Oral before breakfast  risperiDONE   Tablet 4 milliGRAM(s) Oral two times a day  senna 2 Tablet(s) Oral at bedtime  tamsulosin 0.4 milliGRAM(s) Oral at bedtime    MEDICATIONS  (PRN):  acetaminophen     Tablet .. 650 milliGRAM(s) Oral every 6 hours PRN Mild Pain (1 - 3), Moderate Pain (4 - 6)  albuterol    90 MICROgram(s) HFA Inhaler 2 Puff(s) Inhalation every 6 hours PRN Shortness of Breath  bacitracin   Ointment 1 Application(s) Topical three times a day PRN Rash  chlorproMAZINE    Injectable 75 milliGRAM(s) IntraMuscular once PRN agitation  chlorproMAZINE    Tablet 50 milliGRAM(s) Oral every 4 hours PRN agitation  ibuprofen  Tablet. 400 milliGRAM(s) Oral every 6 hours PRN Mild Pain (1 - 3), Moderate Pain (4 - 6)  
MEDICATIONS  (STANDING):  atorvastatin 10 milliGRAM(s) Oral at bedtime  divalproex  milliGRAM(s) Oral two times a day  ferrous    sulfate 325 milliGRAM(s) Oral at bedtime  gabapentin 400 milliGRAM(s) Oral two times a day  levothyroxine 50 MICROGram(s) Oral daily  loratadine 10 milliGRAM(s) Oral daily  melatonin. 5 milliGRAM(s) Oral at bedtime  metFORMIN 1000 milliGRAM(s) Oral two times a day with meals  mirtazapine 15 milliGRAM(s) Oral at bedtime  pantoprazole    Tablet 20 milliGRAM(s) Oral before breakfast  risperiDONE   Tablet 4 milliGRAM(s) Oral two times a day  senna 2 Tablet(s) Oral at bedtime  tamsulosin 0.4 milliGRAM(s) Oral at bedtime    MEDICATIONS  (PRN):  acetaminophen     Tablet .. 650 milliGRAM(s) Oral every 6 hours PRN Mild Pain (1 - 3), Moderate Pain (4 - 6)  albuterol    90 MICROgram(s) HFA Inhaler 2 Puff(s) Inhalation every 6 hours PRN Shortness of Breath  bacitracin   Ointment 1 Application(s) Topical three times a day PRN Rash  chlorproMAZINE    Injectable 75 milliGRAM(s) IntraMuscular once PRN agitation  chlorproMAZINE    Tablet 50 milliGRAM(s) Oral every 4 hours PRN agitation  ibuprofen  Tablet. 400 milliGRAM(s) Oral every 6 hours PRN Mild Pain (1 - 3), Moderate Pain (4 - 6)

## 2023-06-05 NOTE — BH INPATIENT PSYCHIATRY DISCHARGE NOTE - NSBHASSESSSUMMFT_PSY_ALL_CORE
Pt is a 32 yo M, single, disabled, non-caregiver, resides at a [facility], with psych h/o moderate ID, ASD, impulse control disorder, factitious disorder, HAVEN, mood disorders, PTSD and ADHD, multiple past psych admissions (last known [facility] 12/19-12/23/21), numerous ED visits for both medical/psych complaints, longstanding h/o SIB (head banging, cutting), but no known SA, longstanding h/o endorsing SI, h/o aggression toward staff at , PMH significant for asthma, DM, urinary retention, GERD, BPH, hypothyroidism, HLD, HTN, and b/l myopia, on Depakote 500 mg BID, Guanfacine 2 mg, Risperdal 4 mg, and Remeron 15 mg, prescribed by his PCP, who presents to the ED for suicidal and homicidal thoughts to kill self and kill  staff.    On assessment, pt presents with concrete thought process.  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  He endorses that he would kick staff members or would stab himself.  Patient is unable to engage in safety planning.  Patient is unable to identify any specific trigger for why these thoughts have surfaced today. He reported that feel like his medications are not working. He endorses depressed mood, his affect is flat and nonreactive.  Denies manic symptoms.   Denies auditory or visual hallucinations, denies paranoid ideations. Denies alcohol/substance use.   Per collateral from group staff they have significant safety concerns, they are advocating for hospitalization, patient will be admitted involuntarily for safety, stabilization, and treatment.    Upon assessment, patient denies SI/HI/I/P or AH/VH or paranoia.        PLAN:   1. Admit to Low 6, 9.39  2. CO for safety due to SIB and aggression  3. Continue Home medications:  	Risperdal 4mg BID, Depakote 500mg BID, Remeron 15mg QHS, Buspar tapered to 5mg PO BID on 6/1 and discontinued after 2 days, add Neurontin and titrate to 400mg PO BID, Guanfacine 2mg daily in AM, Pravastatin 20mg daily, Loratadine 10mg daily, Levothyroxine 50mcg daily, Metformin 1000mg BID, Pantoprazole 20mg daily, Tamsulosin 0.4mg QHS, Ferrous Sulfate 325mg daily, senna 8.6mg 2 tabs QHS.  PRN medications:  - Thorazine 50mg q6h PO/IM PRN for Agitation  - Albuterol for asthma  4. Labs ordered for am  5. Dispo planning per  pending clinical improvement  symptom management, safety planning, care coordination  Pt is a 32 yo M, single, disabled, non-caregiver, resides at a [facility], with psych h/o moderate ID, ASD, impulse control disorder, factitious disorder, HAVEN, mood disorders, PTSD and ADHD, multiple past psych admissions (last known [facility] 12/19-12/23/21), numerous ED visits for both medical/psych complaints, longstanding h/o SIB (head banging, cutting), but no known SA, longstanding h/o endorsing SI, h/o aggression toward staff at , [facility] significant for asthma, DM, urinary retention, GERD, BPH, hypothyroidism, HLD, HTN, and b/l myopia, on Depakote 500 mg BID, Guanfacine 2 mg, Risperdal 4 mg, and Remeron 15 mg, prescribed by his PCP, who presents to the ED for suicidal and homicidal thoughts to kill self and kill  staff.    On assessment, pt presents with concrete thought process.  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  He endorses that he would kick staff members or would stab himself.  Patient is unable to engage in safety planning.  Patient is unable to identify any specific trigger for why these thoughts have surfaced today. He reported that feel like his medications are not working. He endorses depressed mood, his affect is flat and nonreactive.  Denies manic symptoms.   Denies auditory or visual hallucinations, denies paranoid ideations. Denies alcohol/substance use.   Per collateral from group staff they have significant safety concerns, they are advocating for hospitalization, patient will be admitted involuntarily for safety, stabilization, and treatment.    Upon assessment, patient endorses SI and HI towards staff at the group [facility]. He is on CO for safety.    PLAN:   1. Admit to Low 6, 9.39  2. CO for safety  3. Continue Home medications:  	Risperdal 4mg BID, Depakote 500mg BID, Remeron 15mg QHS, Buspar 15mg QHS, Guanfacine 2mg daily in AM, Pravastatin 20mg daily, Loratadine 10mg daily, Levothyroxine 50mcg daily, Metformin 1000mg BID, Pantoprazole 20mg daily, Tamsulosin 0.4mg QHS, Ferrous Sulfate 325mg daily, senna 8.6mg 2 tabs QHS.  PRN medications:  - Thorazine 50mg q6h PO/IM PRN for Agitation  - Albuterol for asthma  4. Labs ordered for am  5. Dispo planning per  pending clinical improvement

## 2023-06-05 NOTE — BH INPATIENT PSYCHIATRY PROGRESS NOTE - NSBHATTESTBILLING_PSY_A_CORE
02346-Wxjnfaodkh OBS or IP - moderate complexity OR 35-49 mins
09308-Cwyaqtwvek OBS or IP - low complexity OR 25-34 mins
00646-Vsyolmkkhe OBS or IP - low complexity OR 25-34 mins
32064-Vprdgovgva OBS or IP - moderate complexity OR 35-49 mins
93071-Owkjkfciuz OBS or IP - moderate complexity OR 35-49 mins
20790-Iqugikwdvy OBS or IP - moderate complexity OR 35-49 mins
32820-Enrlupdxge OBS or IP - moderate complexity OR 35-49 mins
19061-Cbyosahtxw OBS or IP - moderate complexity OR 35-49 mins
77642-Jwxoevghco OBS or IP - moderate complexity OR 35-49 mins

## 2023-06-05 NOTE — BH INPATIENT PSYCHIATRY PROGRESS NOTE - NSTXVIOLNTGOAL_PSY_ALL_CORE
Will be able to express understanding of at least one trigger to their aggressive behavior
Will decrease the number/duration/intensity of angry/aggressive outbursts
Will be able to express understanding of at least one trigger to their aggressive behavior

## 2023-06-05 NOTE — BH INPATIENT PSYCHIATRY PROGRESS NOTE - NSBHMSEAFFCONG_PSY_A_CORE
Congruent
Congruent
Non-congruent
Congruent
Non-congruent
Congruent
Congruent
Non-congruent
Non-congruent

## 2023-06-05 NOTE — BH INPATIENT PSYCHIATRY PROGRESS NOTE - GENERAL APPEARANCE
Developmentally delayed

## 2023-06-05 NOTE — BH INPATIENT PSYCHIATRY DISCHARGE NOTE - NSDCRECOMMENDMEDICALFT_PSY_ALL_CORE
Per [facility] ED, please follow-up with your primary care provider regarding left arm pain or go to the ED if any worsening pain.  Please also follow-up with your medical providers regarding your medical diagnoses

## 2023-06-05 NOTE — ASSESSMENT
[FreeTextEntry1] : Markus Romero Jr is a 32 years old male with history of autism who has newly diagnosed stage Ia seminoma \par \par  He has stage Ia seminoma. Although his only LDH was 308, hCG and AFP were normal. LDH is nonspecific. NCCN also mentioned decisions regarding treatment should not be made on mildly elevated less than 3 upper limited of normal LDH alone. His LDH will be followed. He has stage Ia seminoma. The recurrence in 5 years is about 10% to 15%. If he is treated with radiation vs one or two doses of carboplatin auc 7, overtreatment is 85% to 90%.  He will be under surveillance given salvage treatment about 100%. He will have follow up in one month for LDH level, then every 3 months in the first year, every 6 months in the year 2 and 3, annually in the year 4 and 5. Surveillance images every 6 months in the first two years, annually in the year 3, 4 and 5. His LDH on Oct 27 was 325, however, hCG and AFP wnl. Andrews 3, . Jan 30, 2023 CT abd and pelvis showed showed KAIN. \par \par Plan\par check labs including LDH, hCG and AFP today\par will follow up with his labs including LDH\par will continue to hold off lipitor given his mildly elevated LFT\par RTC 3 months\par \par Mom contact [phone]\par \par

## 2023-06-05 NOTE — BH INPATIENT PSYCHIATRY PROGRESS NOTE - NSDCCRITERIA_PSY_ALL_CORE
symptom management, safety planning, care coordination

## 2023-06-06 ENCOUNTER — EMERGENCY (EMERGENCY)
Facility: HOSPITAL | Age: 33
LOS: 1 days | Discharge: HOME | End: 2023-06-06
Admitting: EMERGENCY MEDICINE
Payer: MEDICAID

## 2023-06-06 VITALS
RESPIRATION RATE: 16 BRPM | OXYGEN SATURATION: 99 % | TEMPERATURE: 99 F | HEART RATE: 115 BPM | HEIGHT: 71 IN | DIASTOLIC BLOOD PRESSURE: 91 MMHG | SYSTOLIC BLOOD PRESSURE: 142 MMHG

## 2023-06-06 VITALS — TEMPERATURE: 97 F | SYSTOLIC BLOOD PRESSURE: 127 MMHG | DIASTOLIC BLOOD PRESSURE: 83 MMHG | HEART RATE: 108 BPM

## 2023-06-06 PROCEDURE — 99283 EMERGENCY DEPT VISIT LOW MDM: CPT

## 2023-06-06 RX ADMIN — Medication 50 MICROGRAM(S): at 05:59

## 2023-06-06 RX ADMIN — RISPERIDONE 4 MILLIGRAM(S): 4 TABLET ORAL at 09:11

## 2023-06-06 RX ADMIN — PANTOPRAZOLE SODIUM 20 MILLIGRAM(S): 20 TABLET, DELAYED RELEASE ORAL at 06:42

## 2023-06-06 RX ADMIN — DIVALPROEX SODIUM 500 MILLIGRAM(S): 500 TABLET, DELAYED RELEASE ORAL at 09:11

## 2023-06-06 RX ADMIN — GABAPENTIN 400 MILLIGRAM(S): 400 CAPSULE ORAL at 09:11

## 2023-06-06 RX ADMIN — METFORMIN HYDROCHLORIDE 1000 MILLIGRAM(S): 850 TABLET ORAL at 09:11

## 2023-06-06 RX ADMIN — LORATADINE 10 MILLIGRAM(S): 10 TABLET ORAL at 09:11

## 2023-06-06 NOTE — BH DISCHARGE NOTE NURSING/SOCIAL WORK/PSYCH REHAB - NSDCVIVACCINE_GEN_ALL_CORE_FT
Tdap; 20-Dec-2018 12:21; Lo Anaya (RN); [employer]; [id number] (Exp. Date: 02-Nov-2020); IntraMuscular; Deltoid Left.; 0.5 milliLiter(s); VIS (VIS Published: 09-May-2013, VIS Presented: 20-Dec-2018);   Tdap; 26-Mar-2019 18:59; Shavon Barrios (RN); [employer]; [id number] (Exp. Date: 26-Feb-2021); IntraMuscular; Deltoid Left.; 0.5 milliLiter(s); VIS (VIS Published: 09-May-2013, VIS Presented: 26-Mar-2019);   Tdap; 01-Dec-2021 09:35; Zamzam Coulter (RN); [employer]; [id number] (Exp. Date: 09-Sep-2023); IntraMuscular; Deltoid Left.; 0.5 milliLiter(s); VIS (VIS Published: 09-May-2013, VIS Presented: 01-Dec-2021);   Tdap; 21-Jul-2022 01:28; Rose Hancock (RN); [employer]; [id number] (Exp. Date: 14-Oct-2024); IntraMuscular; Deltoid Right.; 0.5 milliLiter(s); VIS (VIS Published: 09-May-2013, VIS Presented: 21-Jul-2022);

## 2023-06-06 NOTE — ED PROVIDER NOTE - OBJECTIVE STATEMENT
from [facility]- lives in a apartment with staff member 24/7  staff member Zain ( [phone])   ( [phone]) This is a 33 yr old M, pmh hld, obesity, Autism, intellectual disability impulse control, with c/o hallucination , intrusive thoughts. Arrived from [facility] home- lives in a apartment with staff member 24/7.  staff member Zain ( [phone]), who reports today he was discharged from [facility]. They had a good day went to the mall. When they arrived home he expressed he has thoughts to act out. Zain called his manager who told him to activate ems.   Upon arrival to , he is appropriate follows direction. Reports voices were telling him to punch the wall and hurt his hand. But he did not do it. He says would not hurt himself or kill himself because that would make his mother very sad.   ( [phone])

## 2023-06-06 NOTE — ED PROVIDER NOTE - PATIENT PORTAL LINK FT
You can access the FollowMyHealth Patient Portal offered by [facility] by registering at the following website: [email or web address]. By joining [employer]’s FollowMyHealth portal, you will also be able to view your health information using other applications (apps) compatible with our system.

## 2023-06-06 NOTE — ED PROVIDER NOTE - NSFOLLOWUPINSTRUCTIONS_ED_ALL_ED_FT
Follow up with your primary care physician and psychiatrist in 48-72 hours.  You may also see the psychiatrist at [facility]:    [address] [place] [address]  Phone: [phone]    [facility] on [facility] [place] Information:    -Walk-in hours: Monday to Friday, 9am to 3pm   -Almost all walk-in patients will be able to see a psych prescriber the same day   -Scheduled, non-urgent, evening remote/virtual appointments are available on a limited basis. Call our  to inquire about these: [phone]. A crisis center clinician screens these requests in the late afternoon and if appropriate it takes a few days to set-up.   -Visits take about 2 to 4 hours total   -Mornings are the best time for patients to arrive    For Telehealth options try:  [employer]: [employer] (to access psychiatrist or therapist)  [employer]: [employer] (to access psychiatrist or therapist)  Better Help: [email or web address] (Largest online therapy group)      SEEK IMMEDIATE MEDICAL CARE IF YOU HAVE ANY OF THE FOLLOWING SYMPTOMS: thoughts about hurting or killing yourself, thoughts about hurting or killing somebody else, hallucinations or any other worsening or persistent symptoms OR ANY NEW OR CONCERNING SYMPTOMS.

## 2023-06-06 NOTE — ED ADULT NURSE NOTE - CHIEF COMPLAINT QUOTE
Pt coming from [facility] accompanied by staff. Reports he was discharged from [facility] this morning, states he feels the same. States he hears voices stating to "punch the wall," and sees people "trying to punch." Endorsing suicidal ideation with no plan. Denies homicidal ideation. Hx: autism, mood disorder

## 2023-06-06 NOTE — ED ADULT NURSE NOTE - NSFALLUNIVINTERV_ED_ALL_ED
Bed/Stretcher in lowest position, wheels locked, appropriate side rails in place/Call bell, personal items and telephone in reach/Instruct patient to call for assistance before getting out of bed/chair/stretcher/Non-slip footwear applied when patient is off stretcher/[place] to call system/Physically safe environment - no spills, clutter or unnecessary equipment/Purposeful proactive rounding/Room/bathroom lighting operational, light cord in reach

## 2023-06-06 NOTE — ED ADULT TRIAGE NOTE - HEART RATE (BEATS/MIN)
115 Cheek Interpolation Flap Text: A decision was made to reconstruct the defect utilizing an interpolation axial flap and a staged reconstruction.  A telfa template was made of the defect.  This telfa template was then used to outline the Cheek Interpolation flap.  The donor area for the pedicle flap was then injected with anesthesia.  The flap was excised through the skin and subcutaneous tissue down to the layer of the underlying musculature.  The interpolation flap was carefully excised within this deep plane to maintain its blood supply.  The edges of the donor site were undermined.   The donor site was closed in a primary fashion.  The pedicle was then rotated into position and sutured.  Once the tube was sutured into place, adequate blood supply was confirmed with blanching and refill.  The pedicle was then wrapped with xeroform gauze and dressed appropriately with a telfa and gauze bandage to ensure continued blood supply and protect the attached pedicle.

## 2023-06-06 NOTE — BH DISCHARGE NOTE NURSING/SOCIAL WORK/PSYCH REHAB - NSDCPRGOAL_PSY_ALL_CORE
Patient has attended less than 5% of daily psychiatric groups and he identified medication compliance and talking to staff as effective coping methods to better meet his needs. Patient reported improved mood and sleep, feeling calmer, and taking better care of himself since admission.  Patient reported intent to maintain medication/treatment compliance in the community, and to maintain appropriate communication with treatment/care providers every day for self-care. Patient reported an adequate social support system, identified family relationships/communication as a need for improvement, and shared long-term goal of obtaining employment.  Patient disclosed plan to remind himself that he wants to be better to himself and others to maintain motivation towards personal goals. Patient denied suicidal ideation, homicidal ideation, psychotic symptoms, or mood symptoms and he completed patient safety plan.

## 2023-06-06 NOTE — BH DISCHARGE NOTE NURSING/SOCIAL WORK/PSYCH REHAB - DISCHARGE INSTRUCTIONS AFTERCARE APPOINTMENTS
In order to check the location, date, or time of your aftercare appointment, please refer to your Discharge Instructions Document given to you upon leaving the hospital.  If you have lost the instructions please call [phone]

## 2023-06-06 NOTE — ED ADULT NURSE NOTE - OBJECTIVE STATEMENT
Pt coming from [facility] accompanied by staff. Reports he was discharged from [facility] this morning, states he feels the same. States he hears voices stating to "punch the wall," and sees people "trying to punch." Endorsing suicidal ideation

## 2023-06-06 NOTE — ED PROVIDER NOTE - PROGRESS NOTE DETAILS
NP Bereczky- good behavioural during stay, no agitation, no acting out behaviour,  good  impulse control, denies si, hi, and hallucination at this time.

## 2023-06-06 NOTE — BH DISCHARGE NOTE NURSING/SOCIAL WORK/PSYCH REHAB - PATIENT PORTAL LINK FT
You can access the FollowMyHealth Patient Portal offered by [facility] by registering at the following website: [email or web address]. By joining [employer]’s FollowMyHealth portal, you will also be able to view your health information using other applications (apps) compatible with our system.

## 2023-06-06 NOTE — ED ADULT NURSE REASSESSMENT NOTE - NS ED NURSE REASSESS COMMENT FT1
Received pt at change of shift. Pt awake sitting in HW chair, calm and cooperative at baseline mental status. NAD, even unlabored respirations observed. VSS. MC for DC to group home with staff via private auto at this time.

## 2023-06-06 NOTE — BH DISCHARGE NOTE NURSING/SOCIAL WORK/PSYCH REHAB - NSDCPRRECOMMEND_PSY_ALL_CORE
Psychiatric rehabilitation recommends that patient demonstrate full compliance with outpatient treatment and that he expands his knowledge/practice of frustration tolerance methods to better meet his needs/avoid readmission.  Upon discharge, patient would benefit from outpatient psychiatric treatment for ongoing medication management, symptom management, and support.

## 2023-06-06 NOTE — ED BEHAVIORAL HEALTH NOTE - BEHAVIORAL HEALTH NOTE
Writer attempted to contact patient’s residence at [phone] to obtain collateral information. Writer left a  requesting a return call.  Writer contacted patient’s staff member guillermo [phone] to obtain collateral information. Writer left a  requesting a return call. Writer attempted to contact patient’s residence at [phone] to obtain collateral information. Writer left a  requesting a return call.  Writer contacted patient’s staff member guillermo [phone] to obtain collateral information. Writer left a  requesting a return call.      Writer contacted patient’s  [phone] chiquita  to obtain collateral information. She reports patient endorses hearing voices today and was sent to make sure he was stable. She says patient was discharged from [facility] today. She said that she is unsure if it is patient’s baseline. She reports patient has been at respite for the past year. Patient lives alone due to behavioral challenges and past aggression. She says patient can return and they wanted to make sure he is stable. Writer informed Chiquita patient would be cleared for discharge.  Per provider, SAUNDRA drake, patient is cleared and is able to return to their previous residence [facility] located at  [address] [place] [address].  has spoken to  Chiquita ([phone]),  confirmed that patients mode of transportation is [facility] and that patient travels w/ staff supervision Clinical provider is in agreement with [facility] back to group home. Verbal huddle regarding coordination of care completed with interdisciplinary team.     Transportation coordinated via [employer] scheduled by  Chiquita. Patient travelling w/ staff member ismael ([phone]). guillermo is nydia re.

## 2023-06-06 NOTE — ED PROVIDER NOTE - CLINICAL SUMMARY MEDICAL DECISION MAKING FREE TEXT BOX
This is a 33 yr old M, pmh hld, obesity, Autism, intellectual disability impulse control, with c/o hallucination , intrusive thoughts. Arrived from [facility] home- lives in a apartment with staff member 24/7.  staff member Zain ( [phone]), who reports today he was discharged from [facility]. They had a good day went to the mall. When they arrived home he expressed he has thoughts to act out. Zain called his manager who told him to activate ems.   Upon arrival to , he is appropriate follows direction. Reports voices were telling him to punch the wall and hurt his hand. But he did not do it. He says would not hurt himself or kill himself because that would make his mother very sad.  Zain said to provider/ writer he usually listens to him. He was not physically aggressive today, but just expressed the ideation to act out.    ( [phone])  Collateral info obtained by [facility], refer to the note. There is no clinical evidence of intoxication, or any acute medical problem requiring immediate intervention. At present time he is  not a harm to self or others and can be safely discharge to follow up with psychiatrist.

## 2023-06-06 NOTE — ED ADULT TRIAGE NOTE - CHIEF COMPLAINT QUOTE
Pt coming from [facility] accompanied by staff. Reports he was discharged from [facility] this morning, states he feels the same. States he hears voices stating to "punch the wall," and sees people "trying to punch." Endorsing suicidal ideation with no plan. Denies homicidal ideation. Hx: autism, mood disorder

## 2023-06-07 ENCOUNTER — APPOINTMENT (OUTPATIENT)
Dept: HEMATOLOGY ONCOLOGY | Facility: CLINIC | Age: 33
End: 2023-06-07

## 2023-06-09 ENCOUNTER — EMERGENCY (EMERGENCY)
Facility: HOSPITAL | Age: 33
LOS: 1 days | Discharge: HOME | End: 2023-06-09
Attending: EMERGENCY MEDICINE
Payer: MEDICAID

## 2023-06-09 VITALS
TEMPERATURE: 98 F | DIASTOLIC BLOOD PRESSURE: 67 MMHG | RESPIRATION RATE: 18 BRPM | HEART RATE: 84 BPM | SYSTOLIC BLOOD PRESSURE: 101 MMHG | OXYGEN SATURATION: 94 %

## 2023-06-09 VITALS
DIASTOLIC BLOOD PRESSURE: 80 MMHG | RESPIRATION RATE: 18 BRPM | WEIGHT: 300.05 LBS | SYSTOLIC BLOOD PRESSURE: 137 MMHG | HEIGHT: 71 IN | HEART RATE: 105 BPM | TEMPERATURE: 97 F | OXYGEN SATURATION: 95 %

## 2023-06-09 LAB
ANION GAP SERPL CALC-SCNC: 6 MMOL/L — SIGNIFICANT CHANGE UP (ref 5–17)
APAP SERPL-MCNC: <2 UG/ML — LOW (ref 10–30)
APPEARANCE UR: CLEAR — SIGNIFICANT CHANGE UP
BACTERIA # UR AUTO: ABNORMAL /HPF
BASOPHILS # BLD AUTO: 0.03 K/UL — SIGNIFICANT CHANGE UP (ref 0–0.2)
BASOPHILS NFR BLD AUTO: 0.4 % — SIGNIFICANT CHANGE UP (ref 0–2)
BILIRUB UR-MCNC: NEGATIVE — SIGNIFICANT CHANGE UP
BUN SERPL-MCNC: 14 MG/DL — SIGNIFICANT CHANGE UP (ref 7–18)
CALCIUM SERPL-MCNC: 9.4 MG/DL — SIGNIFICANT CHANGE UP (ref 8.4–10.5)
CHLORIDE SERPL-SCNC: 106 MMOL/L — SIGNIFICANT CHANGE UP (ref 96–108)
CO2 SERPL-SCNC: 27 MMOL/L — SIGNIFICANT CHANGE UP (ref 22–31)
COLOR SPEC: YELLOW — SIGNIFICANT CHANGE UP
CREAT SERPL-MCNC: 0.8 MG/DL — SIGNIFICANT CHANGE UP (ref 0.5–1.3)
DIFF PNL FLD: NEGATIVE — SIGNIFICANT CHANGE UP
EGFR: 119 ML/MIN/1.73M2 — SIGNIFICANT CHANGE UP
EOSINOPHIL # BLD AUTO: 0.35 K/UL — SIGNIFICANT CHANGE UP (ref 0–0.5)
EOSINOPHIL NFR BLD AUTO: 4.9 % — SIGNIFICANT CHANGE UP (ref 0–6)
EPI CELLS # UR: SIGNIFICANT CHANGE UP /HPF
ETHANOL SERPL-MCNC: 10 MG/DL — SIGNIFICANT CHANGE UP (ref 0–10)
GLUCOSE SERPL-MCNC: 123 MG/DL — HIGH (ref 70–99)
GLUCOSE UR QL: NEGATIVE — SIGNIFICANT CHANGE UP
HCT VFR BLD CALC: 42.2 % — SIGNIFICANT CHANGE UP (ref 39–50)
HGB BLD-MCNC: 13.3 G/DL — SIGNIFICANT CHANGE UP (ref 13–17)
IMM GRANULOCYTES NFR BLD AUTO: 0.6 % — SIGNIFICANT CHANGE UP (ref 0–0.9)
KETONES UR-MCNC: NEGATIVE — SIGNIFICANT CHANGE UP
LEUKOCYTE ESTERASE UR-ACNC: NEGATIVE — SIGNIFICANT CHANGE UP
LYMPHOCYTES # BLD AUTO: 2.56 K/UL — SIGNIFICANT CHANGE UP (ref 1–3.3)
LYMPHOCYTES # BLD AUTO: 35.8 % — SIGNIFICANT CHANGE UP (ref 13–44)
MCHC RBC-ENTMCNC: 25.2 PG — LOW (ref 27–34)
MCHC RBC-ENTMCNC: 31.5 GM/DL — LOW (ref 32–36)
MCV RBC AUTO: 80.1 FL — SIGNIFICANT CHANGE UP (ref 80–100)
MONOCYTES # BLD AUTO: 0.66 K/UL — SIGNIFICANT CHANGE UP (ref 0–0.9)
MONOCYTES NFR BLD AUTO: 9.2 % — SIGNIFICANT CHANGE UP (ref 2–14)
NEUTROPHILS # BLD AUTO: 3.52 K/UL — SIGNIFICANT CHANGE UP (ref 1.8–7.4)
NEUTROPHILS NFR BLD AUTO: 49.1 % — SIGNIFICANT CHANGE UP (ref 43–77)
NITRITE UR-MCNC: NEGATIVE — SIGNIFICANT CHANGE UP
NRBC # BLD: 0 /100 WBCS — SIGNIFICANT CHANGE UP (ref 0–0)
PH UR: 6.5 — SIGNIFICANT CHANGE UP (ref 5–8)
PLATELET # BLD AUTO: 208 K/UL — SIGNIFICANT CHANGE UP (ref 150–400)
POTASSIUM SERPL-MCNC: 4.1 MMOL/L — SIGNIFICANT CHANGE UP (ref 3.5–5.3)
POTASSIUM SERPL-SCNC: 4.1 MMOL/L — SIGNIFICANT CHANGE UP (ref 3.5–5.3)
PROT UR-MCNC: 15 MG/DL
RBC # BLD: 5.27 M/UL — SIGNIFICANT CHANGE UP (ref 4.2–5.8)
RBC # FLD: 13.9 % — SIGNIFICANT CHANGE UP (ref 10.3–14.5)
RBC CASTS # UR COMP ASSIST: SIGNIFICANT CHANGE UP /HPF (ref 0–2)
SALICYLATES SERPL-MCNC: <1.7 MG/DL — LOW (ref 2.8–20)
SARS-COV-2 RNA SPEC QL NAA+PROBE: SIGNIFICANT CHANGE UP
SODIUM SERPL-SCNC: 139 MMOL/L — SIGNIFICANT CHANGE UP (ref 135–145)
SP GR SPEC: 1.02 — SIGNIFICANT CHANGE UP (ref 1.01–1.02)
UROBILINOGEN FLD QL: 1 MG/DL
WBC # BLD: 7.16 K/UL — SIGNIFICANT CHANGE UP (ref 3.8–10.5)
WBC # FLD AUTO: 7.16 K/UL — SIGNIFICANT CHANGE UP (ref 3.8–10.5)
WBC UR QL: SIGNIFICANT CHANGE UP /HPF (ref 0–5)

## 2023-06-09 PROCEDURE — 81001 URINALYSIS AUTO W/SCOPE: CPT

## 2023-06-09 PROCEDURE — 73130 X-RAY EXAM OF HAND: CPT

## 2023-06-09 PROCEDURE — 73110 X-RAY EXAM OF WRIST: CPT | Mod: 26,LT

## 2023-06-09 PROCEDURE — 87635 SARS-COV-2 COVID-19 AMP PRB: CPT

## 2023-06-09 PROCEDURE — 73130 X-RAY EXAM OF HAND: CPT | Mod: 26,LT

## 2023-06-09 PROCEDURE — 80307 DRUG TEST PRSMV CHEM ANLYZR: CPT

## 2023-06-09 PROCEDURE — 80048 BASIC METABOLIC PNL TOTAL CA: CPT

## 2023-06-09 PROCEDURE — 93005 ELECTROCARDIOGRAM TRACING: CPT

## 2023-06-09 PROCEDURE — 85025 COMPLETE CBC W/AUTO DIFF WBC: CPT

## 2023-06-09 PROCEDURE — 99285 EMERGENCY DEPT VISIT HI MDM: CPT | Mod: 25

## 2023-06-09 PROCEDURE — 73110 X-RAY EXAM OF WRIST: CPT

## 2023-06-09 PROCEDURE — 36415 COLL VENOUS BLD VENIPUNCTURE: CPT

## 2023-06-09 PROCEDURE — 99284 EMERGENCY DEPT VISIT MOD MDM: CPT

## 2023-06-09 NOTE — ED PROVIDER NOTE - PHYSICAL EXAMINATION
Musculoskeletal: no swelling, ecchymosis, or deformity to left wrist; nonspecific tenderness to distal left ulna    Psych: + suicidal ideation

## 2023-06-09 NOTE — ED PROVIDER NOTE - OBJECTIVE STATEMENT
34 y/o male, history of hyperlipidemia, autism, impulse control disorder, presenting after reportedly punching wall w/ hand. Complains of pain to his left wrist. States he was angry. When asked about his wrist, he responds suicidal. Patient's aide, Sofia ([phone]), relates that she is doubtful that patient punched wall since she was outside his room the entire time before patient called EMS. Patient has been to several hospitals in the past few days, she suspects in an effort to get admitted. Patient was recently evaluated twice at [facility] until earlier this week. Then, was seen at [facility] and at [facility] and was evaluated and released each time. Aide states that he had medication changes done while at [facility] and then at [facility]. Denies hearing any voices currently. Patient is allergic to Haldol and Zyprexa.

## 2023-06-09 NOTE — ED PROVIDER NOTE - PATIENT PORTAL LINK FT
You can access the FollowMyHealth Patient Portal offered by [facility] by registering at the following website: [email or web address]. By joining [email or web address]’s FollowMyHealth portal, you will also be able to view your health information using other applications (apps) compatible with our system.

## 2023-06-09 NOTE — ED PROVIDER NOTE - PROGRESS NOTE DETAILS
Reassessed patient and now patient states that "I want to go home".  Denies being suicidal, denies having any plans to hurt himself.  Patient statements also witnessed by nurse Larkin.  Plan to discharge back home.  Patient has history of suicidality and retracting his suicidal statements.  Patient has been calm and composed throughout ED stay.  No evidence of drugs or alcohol intoxication.  Patient supervised while at [facility].

## 2023-06-09 NOTE — ED ADULT NURSE NOTE - OBJECTIVE STATEMENT
Patient BIB from [facility] stated "IM not happy, want to end my life", also c/o " left wrist pain after punching the wall" .

## 2023-06-09 NOTE — ED ADULT NURSE NOTE - CAS ELECT INFOMATION PROVIDED
d/c with group home staff/DC instructions d/c with group home staff,pt denies  SI/no HI/DC instructions

## 2023-06-09 NOTE — ED PROVIDER NOTE - NSFOLLOWUPINSTRUCTIONS_ED_ALL_ED_FT
Initially came here for evaluation of left wrist injury.  X-ray did not show any fracture.  Please follow-up with your psychiatrist at [facility].

## 2023-06-09 NOTE — ED PROVIDER NOTE - NSICDXPASTMEDICALHX_GEN_ALL_CORE_FT
PAST MEDICAL HISTORY:  Asthma     Asthma     Autism     Dyslipidemia     Enuresis     Factitious disorder     GERD (gastroesophageal reflux disease)     GERD (gastroesophageal reflux disease)     History of BPH     HLD (hyperlipidemia)     Hypothyroid     Hypothyroidism     Intellectual disability     Mood disorder     Myopia of both eyes     Obesity     Urinary retention       Impulse control disorder

## 2023-06-09 NOTE — ED ADULT NURSE NOTE - ABNORMAL MOVEMENTS
EXAM note      History    Racnho Robertson is a 25 year old male who is here for follow-up on ADD. Reports no side effects on his medications depression or anxiety. No insomnia.      medical history    Patient Active Problem List    Diagnosis Date Noted   • ADD (attention deficit disorder) without hyperactivity 07/08/2016     Priority: Low        social history    Rancho reports that he quit smoking about 10 months ago. His smoking use included Cigarettes. He has a 0.10 pack-year smoking history. He has never used smokeless tobacco.    mEDICATIONS    Current Outpatient Prescriptions   Medication Sig   • [START ON 2/10/2017] amphetamine-dextroamphetamine (ADDERALL XR) 30 MG 24 hr capsule Take 1 capsule by mouth daily.     No current facility-administered medications for this visit.         ALLERGIES:  No Known Allergies    Review of systems    Reviewed        Physical Exam    Visit Vitals   • /80 ([place] [facility] Position: Sitting, Cuff Size: Regular)   • Pulse 76   • Temp 97.7 °F (36.5 °C) (Tympanic)   • Resp 18   • Ht 5' 10.5\" (1.791 m)   • Wt 81.5 kg   • BMI 25.42 kg/m2     Constitutional:  Alert, no acute distress.  Integument:  Warm. Dry. No erythema. No rashes or jaundice.         Assessment & Plan    #1.  ADD:  Stable meds. Follow-up as indicated.      
Health Maintenance Summary     Topic Due On Due Status Completed On Postpone Until Reason    IMMUNIZATION - HPV  May 7, 2001 Overdue       Immunization - Td/Tdap Aug 12, 2020 Not Due Aug 12, 2010      Immunization-Influenza Sep 1, 2016 Postponed  Jan 12, 2017 Patient Refused          Patient is due for topics as listed above, he wishes to decline at this time .      
Tics

## 2023-06-09 NOTE — ED ADULT NURSE NOTE - NSFALLUNIVINTERV_ED_ALL_ED
Bed/Stretcher in lowest position, wheels locked, appropriate side rails in place/Call bell, personal items and telephone in reach/Instruct patient to call for assistance before getting out of bed/chair/stretcher/Non-slip footwear applied when patient is off stretcher/[place] to call system/Physically safe environment - no spills, clutter or unnecessary equipment/Purposeful proactive rounding/Room/bathroom lighting operational, light cord in reach

## 2023-06-09 NOTE — ED PROVIDER NOTE - CLINICAL SUMMARY MEDICAL DECISION MAKING FREE TEXT BOX
34 y/o male, w/ intellectual disability, autism, impulse control disorder, here for suicidal ideation. Patient is calm, reports punching wall, but has no obvious evidence of deformity that would suggest fracture. Plan: to perform X-ray, labs, and reassess. 34 y/o male, w/ intellectual disability, autism, impulse control disorder, here for suicidal ideation. Patient is calm, reports punching wall, but has no obvious evidence of injury or deformity that would suggest fracture. Plan: to perform X-ray, labs, and reassess.

## 2023-06-12 ENCOUNTER — INPATIENT (INPATIENT)
Facility: HOSPITAL | Age: 33
LOS: 10 days | Discharge: ANOTHER HOSPITAL | End: 2023-06-23
Attending: PSYCHIATRY & NEUROLOGY | Admitting: PSYCHIATRY & NEUROLOGY
Payer: MEDICAID

## 2023-06-12 VITALS
HEIGHT: 71 IN | RESPIRATION RATE: 16 BRPM | OXYGEN SATURATION: 97 % | TEMPERATURE: 99 F | SYSTOLIC BLOOD PRESSURE: 106 MMHG | HEART RATE: 80 BPM | DIASTOLIC BLOOD PRESSURE: 73 MMHG

## 2023-06-12 LAB
ALBUMIN SERPL ELPH-MCNC: 4.3 G/DL — SIGNIFICANT CHANGE UP (ref 3.3–5)
ALP SERPL-CCNC: 89 U/L — SIGNIFICANT CHANGE UP (ref 40–120)
ALT FLD-CCNC: 83 U/L — HIGH (ref 4–41)
AMPHET UR-MCNC: NEGATIVE — SIGNIFICANT CHANGE UP
ANION GAP SERPL CALC-SCNC: 13 MMOL/L — SIGNIFICANT CHANGE UP (ref 7–14)
APAP SERPL-MCNC: <10 UG/ML — LOW (ref 15–25)
APPEARANCE UR: CLEAR — SIGNIFICANT CHANGE UP
AST SERPL-CCNC: 42 U/L — HIGH (ref 4–40)
BACTERIA # UR AUTO: NEGATIVE — SIGNIFICANT CHANGE UP
BARBITURATES UR SCN-MCNC: NEGATIVE — SIGNIFICANT CHANGE UP
BASOPHILS # BLD AUTO: 0.06 K/UL — SIGNIFICANT CHANGE UP (ref 0–0.2)
BASOPHILS NFR BLD AUTO: 0.8 % — SIGNIFICANT CHANGE UP (ref 0–2)
BENZODIAZ UR-MCNC: NEGATIVE — SIGNIFICANT CHANGE UP
BILIRUB SERPL-MCNC: 0.3 MG/DL — SIGNIFICANT CHANGE UP (ref 0.2–1.2)
BILIRUB UR-MCNC: NEGATIVE — SIGNIFICANT CHANGE UP
BUN SERPL-MCNC: 13 MG/DL — SIGNIFICANT CHANGE UP (ref 7–23)
CALCIUM SERPL-MCNC: 8.8 MG/DL — SIGNIFICANT CHANGE UP (ref 8.4–10.5)
CHLORIDE SERPL-SCNC: 102 MMOL/L — SIGNIFICANT CHANGE UP (ref 98–107)
CO2 SERPL-SCNC: 24 MMOL/L — SIGNIFICANT CHANGE UP (ref 22–31)
COCAINE METAB.OTHER UR-MCNC: NEGATIVE — SIGNIFICANT CHANGE UP
COLOR SPEC: YELLOW — SIGNIFICANT CHANGE UP
CREAT SERPL-MCNC: 0.9 MG/DL — SIGNIFICANT CHANGE UP (ref 0.5–1.3)
CREATININE URINE RESULT, DAU: 371 MG/DL — SIGNIFICANT CHANGE UP
DIFF PNL FLD: NEGATIVE — SIGNIFICANT CHANGE UP
EGFR: 115 ML/MIN/1.73M2 — SIGNIFICANT CHANGE UP
EOSINOPHIL # BLD AUTO: 0.41 K/UL — SIGNIFICANT CHANGE UP (ref 0–0.5)
EOSINOPHIL NFR BLD AUTO: 5.7 % — SIGNIFICANT CHANGE UP (ref 0–6)
EPI CELLS # UR: 7 /HPF — HIGH (ref 0–5)
ETHANOL SERPL-MCNC: <10 MG/DL — SIGNIFICANT CHANGE UP
FLUAV AG NPH QL: SIGNIFICANT CHANGE UP
FLUBV AG NPH QL: SIGNIFICANT CHANGE UP
GLUCOSE SERPL-MCNC: 108 MG/DL — HIGH (ref 70–99)
GLUCOSE UR QL: NEGATIVE — SIGNIFICANT CHANGE UP
HCT VFR BLD CALC: 43.3 % — SIGNIFICANT CHANGE UP (ref 39–50)
HGB BLD-MCNC: 13.8 G/DL — SIGNIFICANT CHANGE UP (ref 13–17)
HYALINE CASTS # UR AUTO: 2 /LPF — SIGNIFICANT CHANGE UP (ref 0–7)
IANC: 3.37 K/UL — SIGNIFICANT CHANGE UP (ref 1.8–7.4)
IMM GRANULOCYTES NFR BLD AUTO: 0.8 % — SIGNIFICANT CHANGE UP (ref 0–0.9)
KETONES UR-MCNC: ABNORMAL
LEUKOCYTE ESTERASE UR-ACNC: NEGATIVE — SIGNIFICANT CHANGE UP
LYMPHOCYTES # BLD AUTO: 2.71 K/UL — SIGNIFICANT CHANGE UP (ref 1–3.3)
LYMPHOCYTES # BLD AUTO: 37.4 % — SIGNIFICANT CHANGE UP (ref 13–44)
MCHC RBC-ENTMCNC: 25.6 PG — LOW (ref 27–34)
MCHC RBC-ENTMCNC: 31.9 GM/DL — LOW (ref 32–36)
MCV RBC AUTO: 80.3 FL — SIGNIFICANT CHANGE UP (ref 80–100)
METHADONE UR-MCNC: NEGATIVE — SIGNIFICANT CHANGE UP
MONOCYTES # BLD AUTO: 0.64 K/UL — SIGNIFICANT CHANGE UP (ref 0–0.9)
MONOCYTES NFR BLD AUTO: 8.8 % — SIGNIFICANT CHANGE UP (ref 2–14)
NEUTROPHILS # BLD AUTO: 3.37 K/UL — SIGNIFICANT CHANGE UP (ref 1.8–7.4)
NEUTROPHILS NFR BLD AUTO: 46.5 % — SIGNIFICANT CHANGE UP (ref 43–77)
NITRITE UR-MCNC: NEGATIVE — SIGNIFICANT CHANGE UP
NRBC # BLD: 0 /100 WBCS — SIGNIFICANT CHANGE UP (ref 0–0)
NRBC # FLD: 0 K/UL — SIGNIFICANT CHANGE UP (ref 0–0)
OPIATES UR-MCNC: NEGATIVE — SIGNIFICANT CHANGE UP
OXYCODONE UR-MCNC: NEGATIVE — SIGNIFICANT CHANGE UP
PCP SPEC-MCNC: SIGNIFICANT CHANGE UP
PCP UR-MCNC: NEGATIVE — SIGNIFICANT CHANGE UP
PH UR: 6 — SIGNIFICANT CHANGE UP (ref 5–8)
PLATELET # BLD AUTO: 215 K/UL — SIGNIFICANT CHANGE UP (ref 150–400)
POTASSIUM SERPL-MCNC: 4.4 MMOL/L — SIGNIFICANT CHANGE UP (ref 3.5–5.3)
POTASSIUM SERPL-SCNC: 4.4 MMOL/L — SIGNIFICANT CHANGE UP (ref 3.5–5.3)
PROT SERPL-MCNC: 6.8 G/DL — SIGNIFICANT CHANGE UP (ref 6–8.3)
PROT UR-MCNC: ABNORMAL
RBC # BLD: 5.39 M/UL — SIGNIFICANT CHANGE UP (ref 4.2–5.8)
RBC # FLD: 13.7 % — SIGNIFICANT CHANGE UP (ref 10.3–14.5)
RBC CASTS # UR COMP ASSIST: 3 /HPF — SIGNIFICANT CHANGE UP (ref 0–4)
RSV RNA NPH QL NAA+NON-PROBE: SIGNIFICANT CHANGE UP
SALICYLATES SERPL-MCNC: <0.3 MG/DL — LOW (ref 15–30)
SARS-COV-2 RNA SPEC QL NAA+PROBE: SIGNIFICANT CHANGE UP
SODIUM SERPL-SCNC: 139 MMOL/L — SIGNIFICANT CHANGE UP (ref 135–145)
SP GR SPEC: 1.03 — SIGNIFICANT CHANGE UP (ref 1.01–1.05)
THC UR QL: NEGATIVE — SIGNIFICANT CHANGE UP
TOXICOLOGY SCREEN, DRUGS OF ABUSE, SERUM RESULT: SIGNIFICANT CHANGE UP
TSH SERPL-MCNC: 6.86 UIU/ML — HIGH (ref 0.27–4.2)
UROBILINOGEN FLD QL: ABNORMAL
VALPROATE SERPL-MCNC: 61.4 UG/ML — SIGNIFICANT CHANGE UP (ref 50–100)
WBC # BLD: 7.25 K/UL — SIGNIFICANT CHANGE UP (ref 3.8–10.5)
WBC # FLD AUTO: 7.25 K/UL — SIGNIFICANT CHANGE UP (ref 3.8–10.5)
WBC UR QL: 4 /HPF — SIGNIFICANT CHANGE UP (ref 0–5)

## 2023-06-12 PROCEDURE — 99285 EMERGENCY DEPT VISIT HI MDM: CPT

## 2023-06-12 RX ORDER — FERROUS SULFATE 325(65) MG
325 TABLET ORAL DAILY
Refills: 0 | Status: DISCONTINUED | OUTPATIENT
Start: 2023-06-13 | End: 2023-06-14

## 2023-06-12 RX ORDER — MIRTAZAPINE 45 MG/1
15 TABLET, ORALLY DISINTEGRATING ORAL AT BEDTIME
Refills: 0 | Status: DISCONTINUED | OUTPATIENT
Start: 2023-06-13 | End: 2023-06-16

## 2023-06-12 RX ORDER — MIRTAZAPINE 45 MG/1
15 TABLET, ORALLY DISINTEGRATING ORAL ONCE
Refills: 0 | Status: COMPLETED | OUTPATIENT
Start: 2023-06-12 | End: 2023-06-12

## 2023-06-12 RX ORDER — METFORMIN HYDROCHLORIDE 850 MG/1
1000 TABLET ORAL DAILY
Refills: 0 | Status: DISCONTINUED | OUTPATIENT
Start: 2023-06-13 | End: 2023-06-23

## 2023-06-12 RX ORDER — TAMSULOSIN HYDROCHLORIDE 0.4 MG/1
0.4 CAPSULE ORAL AT BEDTIME
Refills: 0 | Status: DISCONTINUED | OUTPATIENT
Start: 2023-06-12 | End: 2023-06-13

## 2023-06-12 RX ORDER — LEVOTHYROXINE SODIUM 125 MCG
50 TABLET ORAL DAILY
Refills: 0 | Status: DISCONTINUED | OUTPATIENT
Start: 2023-06-13 | End: 2023-06-13

## 2023-06-12 RX ORDER — TETANUS TOXOID, REDUCED DIPHTHERIA TOXOID AND ACELLULAR PERTUSSIS VACCINE, ADSORBED 5; 2.5; 8; 8; 2.5 [IU]/.5ML; [IU]/.5ML; UG/.5ML; UG/.5ML; UG/.5ML
0.5 SUSPENSION INTRAMUSCULAR ONCE
Refills: 0 | Status: COMPLETED | OUTPATIENT
Start: 2023-06-12 | End: 2023-06-12

## 2023-06-12 RX ORDER — SENNA PLUS 8.6 MG/1
2 TABLET ORAL AT BEDTIME
Refills: 0 | Status: DISCONTINUED | OUTPATIENT
Start: 2023-06-12 | End: 2023-06-13

## 2023-06-12 RX ORDER — PANTOPRAZOLE SODIUM 20 MG/1
40 TABLET, DELAYED RELEASE ORAL
Refills: 0 | Status: DISCONTINUED | OUTPATIENT
Start: 2023-06-13 | End: 2023-06-13

## 2023-06-12 RX ORDER — TAMSULOSIN HYDROCHLORIDE 0.4 MG/1
0.4 CAPSULE ORAL AT BEDTIME
Refills: 0 | Status: DISCONTINUED | OUTPATIENT
Start: 2023-06-13 | End: 2023-06-23

## 2023-06-12 RX ORDER — GUANFACINE 3 MG/1
2 TABLET, EXTENDED RELEASE ORAL AT BEDTIME
Refills: 0 | Status: DISCONTINUED | OUTPATIENT
Start: 2023-06-12 | End: 2023-06-13

## 2023-06-12 RX ORDER — LEVOTHYROXINE SODIUM 125 MCG
50 TABLET ORAL DAILY
Refills: 0 | Status: DISCONTINUED | OUTPATIENT
Start: 2023-06-13 | End: 2023-06-23

## 2023-06-12 RX ORDER — RISPERIDONE 4 MG/1
4 TABLET ORAL
Refills: 0 | Status: DISCONTINUED | OUTPATIENT
Start: 2023-06-13 | End: 2023-06-23

## 2023-06-12 RX ORDER — FERROUS SULFATE 325(65) MG
325 TABLET ORAL DAILY
Refills: 0 | Status: DISCONTINUED | OUTPATIENT
Start: 2023-06-13 | End: 2023-06-13

## 2023-06-12 RX ORDER — DIVALPROEX SODIUM 500 MG/1
500 TABLET, DELAYED RELEASE ORAL
Refills: 0 | Status: DISCONTINUED | OUTPATIENT
Start: 2023-06-13 | End: 2023-06-15

## 2023-06-12 RX ORDER — DIVALPROEX SODIUM 500 MG/1
500 TABLET, DELAYED RELEASE ORAL
Refills: 0 | Status: COMPLETED | OUTPATIENT
Start: 2023-06-12 | End: 2023-06-13

## 2023-06-12 RX ORDER — METFORMIN HYDROCHLORIDE 850 MG/1
1000 TABLET ORAL
Refills: 0 | Status: DISCONTINUED | OUTPATIENT
Start: 2023-06-12 | End: 2023-06-13

## 2023-06-12 RX ORDER — ATORVASTATIN CALCIUM 80 MG/1
10 TABLET, FILM COATED ORAL AT BEDTIME
Refills: 0 | Status: DISCONTINUED | OUTPATIENT
Start: 2023-06-13 | End: 2023-06-23

## 2023-06-12 RX ORDER — RISPERIDONE 4 MG/1
4 TABLET ORAL
Refills: 0 | Status: DISCONTINUED | OUTPATIENT
Start: 2023-06-12 | End: 2023-06-13

## 2023-06-12 RX ORDER — LORATADINE 10 MG/1
10 TABLET ORAL DAILY
Refills: 0 | Status: DISCONTINUED | OUTPATIENT
Start: 2023-06-13 | End: 2023-06-13

## 2023-06-12 RX ORDER — ATORVASTATIN CALCIUM 80 MG/1
10 TABLET, FILM COATED ORAL AT BEDTIME
Refills: 0 | Status: DISCONTINUED | OUTPATIENT
Start: 2023-06-12 | End: 2023-06-13

## 2023-06-12 RX ORDER — GABAPENTIN 400 MG/1
400 CAPSULE ORAL
Refills: 0 | Status: DISCONTINUED | OUTPATIENT
Start: 2023-06-13 | End: 2023-06-21

## 2023-06-12 RX ADMIN — METFORMIN HYDROCHLORIDE 1000 MILLIGRAM(S): 850 TABLET ORAL at 23:43

## 2023-06-12 RX ADMIN — SENNA PLUS 2 TABLET(S): 8.6 TABLET ORAL at 23:44

## 2023-06-12 RX ADMIN — TAMSULOSIN HYDROCHLORIDE 0.4 MILLIGRAM(S): 0.4 CAPSULE ORAL at 23:44

## 2023-06-12 RX ADMIN — DIVALPROEX SODIUM 500 MILLIGRAM(S): 500 TABLET, DELAYED RELEASE ORAL at 23:43

## 2023-06-12 RX ADMIN — TETANUS TOXOID, REDUCED DIPHTHERIA TOXOID AND ACELLULAR PERTUSSIS VACCINE, ADSORBED 0.5 MILLILITER(S): 5; 2.5; 8; 8; 2.5 SUSPENSION INTRAMUSCULAR at 21:01

## 2023-06-12 RX ADMIN — RISPERIDONE 4 MILLIGRAM(S): 4 TABLET ORAL at 23:44

## 2023-06-12 RX ADMIN — ATORVASTATIN CALCIUM 10 MILLIGRAM(S): 80 TABLET, FILM COATED ORAL at 23:45

## 2023-06-12 RX ADMIN — MIRTAZAPINE 15 MILLIGRAM(S): 45 TABLET, ORALLY DISINTEGRATING ORAL at 23:43

## 2023-06-12 NOTE — ED BEHAVIORAL HEALTH ASSESSMENT NOTE - RISK ASSESSMENT
Patient is currently unable to engage in safety planning, and though he denies current SI/HI, pt experiences these symptoms in the context of being at his current group home, also experiencing CAH to kill himself, has hx of impulsivity, hx of aggression, recent hospital discharge.

## 2023-06-12 NOTE — ED ADULT NURSE NOTE - OBJECTIVE STATEMENT
Pt. c/o self-inflicted laceration with glasses to abdomen today. Admits to hearing voices and seeing ghosts. Superficial laceration noted to abdomen. Denies trying to hurt himself but just trying to relieve stress. Denies H/I. Denies drug or alcohol use. hx schizophrenia, MR, ADHD.

## 2023-06-12 NOTE — ED BEHAVIORAL HEALTH ASSESSMENT NOTE - CURRENT MEDICATION
Risperdal 4mg BID, Depakote 500mg BID, Remeron 15mg QHS,  Guanfacine 2mg daily in AM, Atorvastatin 10mg hs, Loratidine 10mg daily, Levothyroxine 50mcg daily, Metformin 1000mg BID. Pantoprazole 20mg daily, Tamsulosin 0.4mg QHS, Ferrous Sulfate 325mg daily, senna 8.6mg 2 tabs QHS. Risperdal 4mg BID, Depakote 500mg BID, Remeron 15mg QHS,  Guanfacine 2mg daily in AM, Atorvastatin 10mg hs, Loratidine 10mg daily, Levothyroxine 50mcg daily, Metformin 1000mg BID. Pantoprazole 20mg daily, Tamsulosin 0.4mg QHS, Ferrous Sulfate 325mg daily, senna 8.6mg 2 tabs QHS. Gabapentin 400mg bid;

## 2023-06-12 NOTE — ED PROVIDER NOTE - INTERNATIONAL TRAVEL
Physical exam:    Vitals:   T(C): 36.8 (09-10-22 @ 07:17), Max: 36.9 (09-10-22 @ 00:55)  HR: 78 (09-10-22 @ 07:17) (78 - 98)  BP: 104/65 (09-10-22 @ 07:17) (104/65 - 126/78)  RR: 16 (09-10-22 @ 07:17) (16 - 18)  SpO2: 99% (09-10-22 @ 07:17) (98% - 99%)    General: AAOx3, NAD  Heart: RRR  Lungs: CTAB  Abd: Soft, tender on deep palpation RLQ, + mild tenderness LLQ and suprapubic, non distended; no rebound/guarding  Pelvic: deferred No

## 2023-06-12 NOTE — ED BEHAVIORAL HEALTH ASSESSMENT NOTE - NSBHATTESTCOMMENTATTENDFT_PSY_A_CORE
Pt seen, chart reviewed, case discussed with Resident. Pt is a 32 yo M, single, resides at a [facility], with psych h/o moderate ID, ASD, impulse control disorder, factitious disorder, HAVEN, mood disorders, PTSD and ADHD, multiple past psych admissions recently discharged from [facility], numerous ED visits for both medical/psych complaints, longstanding h/o SIB (head banging, cutting), but no known SA, longstanding h/o endorsing SI, h/o aggression toward staff at , PMH significant for asthma, DM, urinary retention, GERD, BPH, hypothyroidism, HLD, HTN, and b/l myopia, on who presents to the ED for self inflicted laceration to abdomen, endorsing CAH to hurt himself and staff at group home. Pt continues to endorse SI and explains that he cut himself secondary to voices. Laceration is superficial, no intervention required. He reports being uphappy at his residence, which has been a source of SIB in the past. Today's incident appears to be 2nd to psychosocial stressor with mother. Pt is unable to safety plan at this time, and due to chronicity of his acting out, and h/o SIB, SA, aggression, pt would benefit from further observation/admission. Labs reviewed, VPA level slightly lower today then on d/c from [facility], possible missing doses.

## 2023-06-12 NOTE — ED ADULT TRIAGE NOTE - CHIEF COMPLAINT QUOTE
Pt. c/o self-inflicted laceration with glasses to abdomen today. Admits to hearing voices and seeing ghosts. Superficial laceration noted to abdomen. Denies trying to hurt himself but just trying to relieve stress. Denies H/I. Denies drug or alcohol use. hx schizophrenia, MR, ADHD.  SAUNDRA Cardenas

## 2023-06-12 NOTE — ED BEHAVIORAL HEALTH ASSESSMENT NOTE - HPI (INCLUDE ILLNESS QUALITY, SEVERITY, DURATION, TIMING, CONTEXT, MODIFYING FACTORS, ASSOCIATED SIGNS AND SYMPTOMS)
Pt is a 34 yo M, single, disabled, non-caregiver, resides at a [facility], with psych h/o moderate ID, ASD, impulse control disorder, factitious disorder, HAVEN, mood disorders, PTSD and ADHD, multiple past psych admissions (last known [facility] 12/19-12/23/21), numerous ED visits for both medical/psych complaints, longstanding h/o SIB (head banging, cutting), but no known SA, longstanding h/o endorsing SI, h/o aggression toward staff at , [facility] significant for asthma, DM, urinary retention, GERD, BPH, hypothyroidism, HLD, HTN, and b/l myopia, on Depakote 500 mg BID, Guanfacine 2 mg, Risperdal 4 mg, and Remeron 15 mg, prescribed by his PCP, who presents to the ED for self inflicted laceration to abdomen, endorsing CAH to hurt himself and staff at group home.     On interview, pt presents with concrete thought process. Initially states he used a knife to inflict the lacerations, but later states he cannot remember what he used. (Per report from group home staff, pt used the metal screws on a pair of eye glasses to inflict the lacerations.) States he did this because he wanted to kill himself, also states he was experiencing CAH to kill himself at the time. States he has been experiencing AH since discharge from [facility] on 5/5/23 and that he doesn't like living at his group home because he doesn't like the staff and feels like they watch him too much. Pt requests to be admitted to [facility], cites the fact that he wants to find another group home as his reason for wanting admission at this time. Pt states he sometimes refuses medications at the group home and doesn't like taking medication, feels the medication cause him to experience AH, though he admits he was not hearing voices while admitted at [facility] and was taking medications. Pt denies other stressors and agrees that if he were to have another place to live, he does not believe he would continue to have SI/HI. Pt states if he were to return to current group home, he "feel[s] like something bad would happen." States that whenever he is at the group home, he experiences the CAH and that he would not be able to resist harming himself or acting out towards staff. Pt unable to safety plan at this time.     Per collateral from staff from prior ED presentation, pt has hx of becoming easily agitated and can go from "0-100 often", in the past got into fights with others, has punched walls in the past, has been tased by police in the context of agitation in the past.   See  note for collateral from staff member at group home, who was present during the incident that occurred today. Pt is a 34 yo M, single, disabled, non-caregiver, resides at a [facility], with psych h/o moderate ID, ASD, impulse control disorder, factitious disorder, HAVEN, mood disorders, PTSD and ADHD, multiple past psych admissions (last known [facility] 12/19-12/23/21), numerous ED visits for both medical/psych complaints, longstanding h/o SIB (head banging, cutting), but no known SA, longstanding h/o endorsing SI, h/o aggression toward staff at , [facility] significant for asthma, DM, urinary retention, GERD, BPH, hypothyroidism, HLD, HTN, and b/l myopia, on Depakote 500 mg BID, Guanfacine 2 mg, Risperdal 4 mg, and Remeron 15 mg, prescribed by his PCP, who presents to the ED for self inflicted laceration to abdomen, endorsing CAH to hurt himself and staff at group home.     On interview, pt presents with concrete thought process. Initially states he used a knife to inflict the lacerations, but later states he cannot remember what he used. (Per report from group home staff, pt used the metal screws on a pair of eye glasses to inflict the lacerations.) States he did this because he was experiencing CAH to kill himself at the time. States he has been experiencing AH since discharge from [facility] on 5/5/23 and that he doesn't like living at his group home because he doesn't like the staff and feels like they watch him too much. Pt requests to be admitted to [facility], wants medications to be changed in order to better treat AH, as he finds them very bothersome and difficult to ignore. Pt states he sometimes refuses medications at the group home and doesn't like taking medication, feels the medication cause him to experience AH, though he admits he was not hearing voices while admitted at [facility] and was taking medications. Pt denies other stressors and agrees that if he were to have another place to live, he does not believe he would continue to have SI/HI. Pt states if he were to return to current group home, he "feel[s] like something bad would happen." States that whenever he is at the group home, he experiences the CAH and that he would not be able to resist harming himself or acting out towards staff. Pt unable to safety plan at this time.     Per collateral from staff from prior ED presentation, pt has hx of becoming easily agitated and can go from "0-100 often", in the past got into fights with others, has punched walls in the past, has been tased by police in the context of agitation in the past.   See BH note for collateral from staff member at group home, who was present during the incident that occurred today. Pt is a 32 yo M, single, disabled, non-caregiver, [facility], with psych h/o moderate ID, ASD, impulse control disorder, factitious disorder, HAVEN, mood disorders, PTSD and ADHD, multiple past psych admissions (last known [facility] 5/26-6/6/2023), numerous ED visits for both medical/psych complaints, longstanding h/o SIB (head banging, cutting), but no known SA, longstanding h/o endorsing SI, h/o aggression toward staff at , [facility] significant for asthma, DM, urinary retention, GERD, BPH, hypothyroidism, HLD, HTN, and b/l myopia, on Depakote 500 mg BID, Guanfacine 2 mg, Risperdal 4 mg, and Remeron 15 mg, prescribed by his PCP, who presents to the ED for self inflicted laceration to abdomen, endorsing CAH to hurt himself and staff at group home.     On interview, pt presents with concrete thought process. Initially states he used a knife to inflict the lacerations, but later states he cannot remember what he used. (Per report from group home staff, pt used the metal screws on a pair of eye glasses to inflict the lacerations.) States he did this because he was experiencing CAH to kill himself at the time. States he has been experiencing AH since discharge from [facility] on 5/5/23 and that he doesn't like living at his group home because he doesn't like the staff and feels like they watch him too much. Pt requests to be admitted to [facility], wants medications to be changed in order to better treat AH, as he finds them very bothersome and difficult to ignore. Pt states he sometimes refuses medications at the group home and doesn't like taking medication, feels the medication cause him to experience AH, though he admits he was not hearing voices while admitted at [facility] and was taking medications. Pt denies other stressors and agrees that if he were to have another place to live, he does not believe he would continue to have SI/HI. Pt states if he were to return to current group home, he "feel[s] like something bad would happen." States that whenever he is at the group home, he experiences the CAH and that he would not be able to resist harming himself or acting out towards staff. Pt unable to safety plan at this time.     Per collateral from staff from prior ED presentation, pt has hx of becoming easily agitated and can go from "0-100 often", in the past got into fights with others, has punched walls in the past, has been tased by police in the context of agitation in the past.   See  note for collateral from staff member at group home, who was present during the incident that occurred today.

## 2023-06-12 NOTE — ED BEHAVIORAL HEALTH ASSESSMENT NOTE - DESCRIPTION
Vital Signs Last 24 Hrs  T(C): 37 (12 Jun 2023 18:57), Max: 37 (12 Jun 2023 18:57)  T(F): 98.6 (12 Jun 2023 18:57), Max: 98.6 (12 Jun 2023 18:57)  HR: 80 (12 Jun 2023 18:57) (80 - 80)  BP: 106/73 (12 Jun 2023 18:57) (106/73 - 106/73)  BP(mean): --  RR: 16 (12 Jun 2023 18:57) (16 - 16)  SpO2: 97% (12 Jun 2023 18:57) (97% - 97%)    Parameters below as of 12 Jun 2023 18:57  Patient On (Oxygen Delivery Method): room air See hpi

## 2023-06-12 NOTE — ED PROVIDER NOTE - PROGRESS NOTE DETAILS
SAUNDRA Lake- psych recommendation inpatient tx, at this time boarding, no available inpatient psych beds. Cornelius LEMONS: Pt signed out to me.  He is presenting after self-injurous behaviors at his group home.  Pt has been evaluated by psychiatry and is boarding pending bed availability for admission.

## 2023-06-12 NOTE — ED PROVIDER NOTE - CLINICAL SUMMARY MEDICAL DECISION MAKING FREE TEXT BOX
This is a 32 y/o male, history of hyperlipidemia, autism, impulse control disorder with self inflected injury. Pt arrived from a group home after getting a sharp object (his glass ) and cut himself on right later side on his abd.   LAbs, psych consult, adacel, cleaned wound no sutures placed at this time, bleeding control, monitor for s/s of infection of wound.

## 2023-06-12 NOTE — ED BEHAVIORAL HEALTH ASSESSMENT NOTE - DETAILS
Group home notified Per chart has prior trauma(details unknown) endorses plan to stab himself, denies other plans for suicide. CAROL Reported plans to hurt staff. Per chart has had a rash in response to Zyprexa as above after hours MIRELLA ML4-

## 2023-06-12 NOTE — ED BEHAVIORAL HEALTH ASSESSMENT NOTE - OTHER
Group Home Staff concrete "sad" denies current AH, but endorses CAH prior to presentation Group Home

## 2023-06-12 NOTE — ED BEHAVIORAL HEALTH NOTE - BEHAVIORAL HEALTH NOTE
Writer contacted pt's , Chiquita [phone] to obtain collateral information. Chiquita was not in the office today and was able to get another staff member on the phone, Giorgio:     Giorgio states pt was on the phone with his mother and phone got disconnected. Pt started cursing but shortly after this calmed down and started watching TV. Pt then went to room and Giorgio went to check on him a few minutes later to find pt on the phone again and assumed pt was on the phone with his mother again, pt told Giorgio at this time that nothing was wrong and that he was on the phone with his mother. Giorgio states that shortly after this, EMS arrived and pt disclosed that he had cut himself using the metal screws on the side of a pair of eyeglasses. . Pt told EMT  "I'm pissed off, voices are telling me to kill myself." , which prompted EMS to take him to the hospital.  States pt has never made threats to harm staff. States pt sometimes refuses medications but for the most part does accept them eventually and that pt takes medication most nights. Writer contacted pt's , Chiquita [phone] to obtain collateral information. Chiquita was not in the office today and was able to get another staff member on the phone, Giorgio:     Giorgio states pt was on the phone with his mother and phone got disconnected. Pt started cursing but shortly after this calmed down and started watching TV. Pt then went to room and Giorgio went to check on him a few minutes later to find pt on the phone again and assumed pt was on the phone with his mother again, pt told Giorgio at this time that nothing was wrong and that he was on the phone with his mother. Giorgio states that shortly after this, EMS arrived and pt disclosed that he had cut himself using the metal screws on the side of a pair of eyeglasses. . Pt told EMT  "I'm pissed off, voices are telling me to kill myself and I'm going to do it" , which prompted EMS to take him to the hospital.  Giorgio states that today, pt did not make any threats to harm staff, but that he has done so in the past. States pt sometimes refuses medications but for the most part does accept them eventually and that pt takes medication most nights. Takes HS meds typically at 7pm Writer contacted pt's , Chiquita [phone] to obtain collateral information. Chiquita was not in the office today and was able to get another staff member on the phone, Giorgio:     Giorgio states pt was on the phone with his mother and phone got disconnected. Pt started cursing but shortly after this calmed down and started watching TV. Pt then went to room and Giorgio went to check on him a few minutes later to find pt on the phone again and assumed pt was on the phone with his mother again, pt told Giorgio at this time that nothing was wrong and that he was on the phone with his mother. Giorgio states that shortly after this, EMS arrived and pt disclosed that he had cut himself using the metal screws on the side of a pair of eyeglasses. . Pt told EMT  "I'm pissed off, voices are telling me to kill myself and I'm going to do it" , which prompted EMS to take him to the hospital.  Giorgio states that today, pt did not make any threats to harm staff, but that he has done so in the past. States pt sometimes refuses medications but for the most part does accept them eventually and that pt takes medication most nights. Takes HS meds typically at 7pm.

## 2023-06-12 NOTE — ED BEHAVIORAL HEALTH ASSESSMENT NOTE - NS ED BHA PLAN MEDICAL ISSUES2 FT
Atorvastatin 10mg hs, Metformin 1000mg BID, Levothyroxine 50mcg, Tamsulosin 0.4mg hs, Ferrous sulfate 325mg hs

## 2023-06-12 NOTE — ED BEHAVIORAL HEALTH ASSESSMENT NOTE - NSBHATTESTBILLING_PSY_A_CORE
99284-Emergency Department visit - moderate complexitytion 99285-Emergency department visit - high complexity

## 2023-06-12 NOTE — ED BEHAVIORAL HEALTH ASSESSMENT NOTE - TELEPSYCHIATRY?
Smoking Cessation - topics covered   []  Health Risks  []  Benefits of Quitting   []  Smoking Cessation  []  Patient has no history of tobacco use  []  Patient is former smoker. []  No need for tobacco cessation education. []  Booklet given  [x]  Patient verbalizes understanding. [x]  Patient denies need for tobacco cessation education.   Virgen Smith  2:18 PM No
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
## 2023-06-12 NOTE — ED BEHAVIORAL HEALTH ASSESSMENT NOTE - NS ED BHA PLAN PSYCHIATRIC ISSUES2 FT
Continue home meds: Gabapentin 400mg BID, Risperidone 4mg BID, Depakote 500mg BID, Guanfacine 2mg daily

## 2023-06-12 NOTE — ED PROVIDER NOTE - NSICDXPASTMEDICALHX_GEN_ALL_CORE_FT
PAST MEDICAL HISTORY:  Asthma     Asthma     Autism     Dyslipidemia     Enuresis     Factitious disorder     GERD (gastroesophageal reflux disease)     GERD (gastroesophageal reflux disease)     History of BPH     HLD (hyperlipidemia)     Hypothyroid     Hypothyroidism     Impulse control disorder     Intellectual disability     Mood disorder     Myopia of both eyes     Obesity     Urinary retention

## 2023-06-12 NOTE — ED BEHAVIORAL HEALTH ASSESSMENT NOTE - PSYCHIATRIC ISSUES AND PLAN (INCLUDE STANDING AND PRN MEDICATION)
See plan above Continue home meds: Gabapentin 400mg BID, Risperidone 4mg BID, Depakote 500mg BID, Guanfacine 2mg daily

## 2023-06-12 NOTE — ED PROVIDER NOTE - RESPIRATORY, MLM
How Severe Are Your Spot(S)?: mild What Type Of Note Output Would You Prefer (Optional)?: Standard Output What Is The Reason For Today's Visit?: Full Body Skin Examination with No Concerns What Is The Reason For Today's Visit? (Being Monitored For X): concerning skin lesions on an annual basis Breath sounds clear and equal bilaterally.

## 2023-06-12 NOTE — ED PROVIDER NOTE - OBJECTIVE STATEMENT
This is a 34 y/o male, history of hyperlipidemia, autism, impulse control disorder with self inflected injury. This is a 34 y/o male, history of hyperlipidemia, autism, impulse control disorder with self inflected injury. Pt arrived from a group home after getting a sharp object (his glass ) and cut himself on right later side on his abd.

## 2023-06-12 NOTE — ED BEHAVIORAL HEALTH ASSESSMENT NOTE - MEDICAL ISSUES AND PLAN (INCLUDE STANDING AND PRN MEDICATION)
See plan above Atorvastatin 10mg hs, Metformin 1000mg BID, Levothyroxine 50mcg, Tamsulosin 0.4mg hs, Ferrous sulfate 325mg hs

## 2023-06-12 NOTE — ED ADULT NURSE NOTE - NSFALLUNIVINTERV_ED_ALL_ED
Bed/Stretcher in lowest position, wheels locked, appropriate side rails in place/Call bell, personal items and telephone in reach/Instruct patient to call for assistance before getting out of bed/chair/stretcher/Non-slip footwear applied when patient is off stretcher/[place] to call system/Physically safe environment - no spills, clutter or unnecessary equipment/Purposeful proactive rounding/Room/bathroom lighting operational, light cord in reach

## 2023-06-13 DIAGNOSIS — F41.1 GENERALIZED ANXIETY DISORDER: ICD-10-CM

## 2023-06-13 DIAGNOSIS — F84.0 AUTISTIC DISORDER: ICD-10-CM

## 2023-06-13 DIAGNOSIS — F43.10 POST-TRAUMATIC STRESS DISORDER, UNSPECIFIED: ICD-10-CM

## 2023-06-13 DIAGNOSIS — F33.2 MAJOR DEPRESSIVE DISORDER, RECURRENT SEVERE WITHOUT PSYCHOTIC FEATURES: ICD-10-CM

## 2023-06-13 DIAGNOSIS — F63.9 IMPULSE DISORDER, UNSPECIFIED: ICD-10-CM

## 2023-06-13 PROCEDURE — 99222 1ST HOSP IP/OBS MODERATE 55: CPT

## 2023-06-13 RX ORDER — CHLORPROMAZINE HCL 10 MG
50 TABLET ORAL ONCE
Refills: 0 | Status: DISCONTINUED | OUTPATIENT
Start: 2023-06-13 | End: 2023-06-23

## 2023-06-13 RX ORDER — CHLORPROMAZINE HCL 10 MG
50 TABLET ORAL EVERY 6 HOURS
Refills: 0 | Status: DISCONTINUED | OUTPATIENT
Start: 2023-06-13 | End: 2023-06-23

## 2023-06-13 RX ADMIN — METFORMIN HYDROCHLORIDE 1000 MILLIGRAM(S): 850 TABLET ORAL at 06:04

## 2023-06-13 RX ADMIN — MIRTAZAPINE 15 MILLIGRAM(S): 45 TABLET, ORALLY DISINTEGRATING ORAL at 20:22

## 2023-06-13 RX ADMIN — DIVALPROEX SODIUM 500 MILLIGRAM(S): 500 TABLET, DELAYED RELEASE ORAL at 06:03

## 2023-06-13 RX ADMIN — Medication 50 MILLIGRAM(S): at 20:22

## 2023-06-13 RX ADMIN — Medication 50 MICROGRAM(S): at 06:04

## 2023-06-13 RX ADMIN — GABAPENTIN 400 MILLIGRAM(S): 400 CAPSULE ORAL at 20:22

## 2023-06-13 RX ADMIN — ATORVASTATIN CALCIUM 10 MILLIGRAM(S): 80 TABLET, FILM COATED ORAL at 20:22

## 2023-06-13 RX ADMIN — RISPERIDONE 4 MILLIGRAM(S): 4 TABLET ORAL at 06:04

## 2023-06-13 RX ADMIN — RISPERIDONE 4 MILLIGRAM(S): 4 TABLET ORAL at 20:21

## 2023-06-13 RX ADMIN — TAMSULOSIN HYDROCHLORIDE 0.4 MILLIGRAM(S): 0.4 CAPSULE ORAL at 20:22

## 2023-06-13 RX ADMIN — PANTOPRAZOLE SODIUM 40 MILLIGRAM(S): 20 TABLET, DELAYED RELEASE ORAL at 09:48

## 2023-06-13 RX ADMIN — DIVALPROEX SODIUM 500 MILLIGRAM(S): 500 TABLET, DELAYED RELEASE ORAL at 20:22

## 2023-06-13 NOTE — BH INPATIENT PSYCHIATRY ASSESSMENT NOTE - NSICDXPASTMEDICALHX_GEN_ALL_CORE_FT
FAMILY HISTORY:  Mother  Still living? Unknown  FH: hypertension, Age at diagnosis: Age Unknown  FH: type 2 diabetes, Age at diagnosis: Age Unknown    
PAST MEDICAL HISTORY:  Asthma     Asthma     Autism     Dyslipidemia     Enuresis     Factitious disorder     GERD (gastroesophageal reflux disease)     GERD (gastroesophageal reflux disease)     History of BPH     HLD (hyperlipidemia)     Hypothyroid     Hypothyroidism     Impulse control disorder     Intellectual disability     Mood disorder     Myopia of both eyes     Obesity     Urinary retention

## 2023-06-13 NOTE — BH INPATIENT PSYCHIATRY ASSESSMENT NOTE - NSBHCHARTREVIEWVS_PSY_A_CORE FT
Vital Signs Last 24 Hrs  T(C): 36.7 (06-13-23 @ 11:15), Max: 37 (06-12-23 @ 18:57)  T(F): 98 (06-13-23 @ 11:15), Max: 98.6 (06-12-23 @ 18:57)  HR: 85 (06-13-23 @ 11:15) (73 - 85)  BP: 109/71 (06-13-23 @ 11:15) (106/73 - 118/79)  BP(mean): --  RR: 17 (06-13-23 @ 11:15) (15 - 17)  SpO2: 100% (06-13-23 @ 11:15) (97% - 100%)     Vital Signs Last 24 Hrs  T(C): 35.9 (06-15-23 @ 08:14), Max: 37 (06-14-23 @ 19:19)  T(F): 96.7 (06-15-23 @ 08:14), Max: 98.6 (06-14-23 @ 19:19)  HR: --  BP: --  BP(mean): --  RR: 18 (06-14-23 @ 20:19) (18 - 18)  SpO2: --    Orthostatic VS  06-15-23 @ 08:14  Lying BP: --/-- HR: --  Sitting BP: 105/78 HR: 71  Standing BP: 110/60 HR: 90  Site: --  Mode: electronic  Orthostatic VS  06-14-23 @ 19:19  Lying BP: --/-- HR: --  Sitting BP: 135/94 HR: 86  Standing BP: 130/92 HR: 108  Site: --  Mode: --  Orthostatic VS  06-14-23 @ 08:27  Lying BP: --/-- HR: --  Sitting BP: 90/62 HR: 73  Standing BP: 101/74 HR: 71  Site: --  Mode: --  Orthostatic VS  06-13-23 @ 19:05  Lying BP: --/-- HR: --  Sitting BP: 116/73 HR: 91  Standing BP: 119/66 HR: 106  Site: --  Mode: electronic  Orthostatic VS  06-13-23 @ 13:20  Lying BP: --/-- HR: --  Sitting BP: 115/59 HR: 88  Standing BP: 111/74 HR: 97  Site: --  Mode: --
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
## 2023-06-13 NOTE — ED ADULT NURSE REASSESSMENT NOTE - NS ED NURSE REASSESS COMMENT FT1
Received pt and report at change of shift. Pt is awake, calm at baseline mental status. Psych eval in progress. TDAP administered as ordered for lac to right side abdominal area; no active bleeding, denies pain. Pending MC and disposition. VSS, respirations are even and unlabored. Will continue to monitor for safety.
Report given to ARMANDO Holt for Pt. Security called for transport. Pt aware of plan. Pt willing and endorsing desire to go to [facility]. Pt cooperative, NAD.
group home phone number- [phone]    Mom- [phone]
Patient sleeping in bed. Respirations even and unlabored. NAD. Awaiting psychiatric bed assignment. Safety maintained. Will continue to monitor.
Patient awake and alert, calm, cooperative, respirations even and unlabored. Vitals stable. AM medications administered per orders. Patient tolerated well. FS completed. Awaiting psychiatric admission.

## 2023-06-13 NOTE — BH INPATIENT PSYCHIATRY ASSESSMENT NOTE - NSICDXBHSECONDARYDX_PSY_ALL_CORE
Autistic spectrum disorder   F84.0  HAVEN (generalized anxiety disorder)   F41.1  Post traumatic stress disorder (PTSD)   F43.10  Impulse control disorder   F63.9

## 2023-06-13 NOTE — BH INPATIENT PSYCHIATRY ASSESSMENT NOTE - DETAILS
Per chart has had a rash in response to Zyprexa Reported plans to hurt staff. Per chart has prior trauma(details unknown) endorses plan to stab himself, denies other plans for suicide.

## 2023-06-13 NOTE — BH PATIENT PROFILE - HOME MEDICATIONS
levothyroxine 50 mcg (0.05 mg) oral tablet , 1 tab(s) orally once a day  pantoprazole 40 mg oral delayed release tablet , 1 tab(s) orally once a day  Melatonin 5 mg oral tablet , 1 tab(s) orally once a day (at bedtime)  senna leaf extract oral tablet , 2 tab(s) orally once a day (at bedtime)  ferrous sulfate 325 mg (65 mg elemental iron) oral tablet , 1 tab(s) orally once a day (at bedtime)  risperiDONE 4 mg oral tablet , 1 tab(s) orally 2 times a day  atorvastatin 10 mg oral tablet , 1 tab(s) orally once a day (at bedtime)  loratadine 10 mg oral tablet , 1 tab(s) orally once a day  metFORMIN 1000 mg oral tablet , 1 tab(s) orally 2 times a day (with meals)  mirtazapine 15 mg oral tablet , 1 tab(s) orally once a day (at bedtime)  gabapentin 400 mg oral capsule , 1 cap(s) orally 2 times a day  divalproex sodium 500 mg oral delayed release tablet , 1 tab(s) orally 2 times a day  guanFACINE 2 mg oral tablet , 1 tab(s) orally once a day  tamsulosin 0.4 mg oral capsule , 1 cap(s) orally once a day (at bedtime)

## 2023-06-13 NOTE — BH INPATIENT PSYCHIATRY ASSESSMENT NOTE - NSBHMETABOLIC_PSY_ALL_CORE_FT
BMI: BMI (kg/m2): 41.9 (06-12-23 @ 18:57)  HbA1c: A1C with Estimated Average Glucose Result: 6.5 % (05-31-23 @ 09:26)    Glucose: POCT Blood Glucose.: 85 mg/dL (06-13-23 @ 06:02)    BP: 109/71 (06-13-23 @ 11:15) (106/73 - 118/79)  Lipid Panel: Date/Time: 05-31-23 @ 09:26  Cholesterol, Serum: 128  Direct LDL: --  HDL Cholesterol, Serum: 31  Total Cholesterol/HDL Ration Measurement: --  Triglycerides, Serum: 157   BMI: BMI (kg/m2): 41 (06-13-23 @ 13:20)  HbA1c: A1C with Estimated Average Glucose Result: 6.5 % (05-31-23 @ 09:26)    Glucose: POCT Blood Glucose.: 165 mg/dL (06-15-23 @ 08:26)    BP: 109/71 (06-13-23 @ 11:15) (106/73 - 118/79)  Lipid Panel: Date/Time: 05-31-23 @ 09:26  Cholesterol, Serum: 128  Direct LDL: --  HDL Cholesterol, Serum: 31  Total Cholesterol/HDL Ration Measurement: --  Triglycerides, Serum: 157

## 2023-06-13 NOTE — BH INPATIENT PSYCHIATRY ASSESSMENT NOTE - ATTENDING COMMENTS
Consistent with acute depressive episode with associated behavioral dysregulation in context of intellectual disability with impulse control disorder, concerning for self harm with broken glass prior to admission, recent hospitalization, emotional volatility, expression of CAH SI though more likely intrusive thoughts based on evaluation/patient's description, r/o provocative statements and although somewhat inconsistent report though significant enough concern given significant self harm, recent escalation, history of aggression and presently poor impulse control to require CO for risk of harm to self. Agree with plan to resume outpatient medication regimen as above.

## 2023-06-13 NOTE — ED BEHAVIORAL HEALTH NOTE - BEHAVIORAL HEALTH NOTE
COVID Exposure Screen- collateral (i.e. third-party, chart review, belongings, etc; include EMS and ED staff)     1.        *Has the patient had a COVID-19 test in the last 21 days?  (  x) Yes   (  ) No   (  ) Unknown- Reason: ______  IF YES PROCEED TO QUESTION #2. IF NO OR UNKNOWN THEN PLEASE SKIP TO QUESTION #3.  2.        Date of test: ________  3.        Do you know the result? ( x ) Negative   (  ) Positive   (  ) No result available  4.        *In the past 14 days, has the patient been around anyone with a positive COVID-19 test?*  (  ) Yes   (  ) No   (  x) Unknown- Reason (e.g. collateral uncertain, refusing to answer, etc.):  ______  IF YES PROCEED TO QUESTION #5. IF NO or UNKNOWN, PLEASE SKIP TO QUESTION #10  5.        Was the patient within 6 feet of them for at least 15 minutes? (  ) Yes   (  ) No   (  ) Unknown- Reason: ______   6.        Did the patient provide care for them? (  ) Yes   (  ) No   (  ) Unknown- Reason: ______   7.        Did the patient have direct physical contact with them (touched, hugged, or kissed them)? (  ) Yes   (  ) No    (  ) Unknown- Reason: ______   8.        Did the patient share eating or drinking utensils with them? (  ) Yes   (  ) No    (  ) Unknown- Reason: ______   9.        Have they sneezed, coughed, or somehow got respiratory droplets on the patient? (  ) Yes   (  ) No    (  ) Unknown- Reason: ______   10.     *Have you been out of [place] within the past 14 days?*  (  ) Yes   ( x ) No   (  ) Unknown- Reason (e.g. patient uncertain, sedated, refusing to answer, etc.): _______  IF YES PLEASE ANSWER THE FOLLOWING QUESTIONS:  11.     Which state/country have you been to? ______  12.     Were you there over 24 hours? (  ) Yes   (  ) No    (  ) Unknown- Reason: ______  13.     Date of return to [facility]: ______

## 2023-06-13 NOTE — BH INPATIENT PSYCHIATRY ASSESSMENT NOTE - NSBHASSESSSUMMFT_PSY_ALL_CORE
33 year old single male, domiciled at [facility]. Patient has past psychiatric history of moderate ID, ASD, impulse control disorder, factitious disorder, HAVEN, mood disorders, PTSD and ADHD.  Patient has extensive history of inpatient hospitalization and ED visits. Most recently patient was discharged from [facility] on 6/6/23.  Patient is re-hospitalized with a primary problem self inflicting laceration to abdomen, CAH to hurt self and others.  Patient admitted to [facility] on a 9.27 legal status.   Patient requires inpatient hospitalization due to symptoms of mental illness so severe that they significantly interfere with activities of daily living, and presents a potential danger to self as a result of acute decompensation with s/p  SA,  he is requiring 24-hour care at this time as a result, for psychiatric stabilization and safety.     Plan:  >Legal: 9.27  >Obs: Routine, no current SI. no need for CO, patient not expected to pose risk to self or others in controlled inpatient setting  >Psychiatric Meds: Restart outpatient medication regimen: Depakote 500mg BID, Guanfacine 2mg, Risoerdal 4mg BID, Remeron 15mg qhs. Observe for tolerability and efficacy. Patient had been poorly adherent prior to admission.   PRN medications:  Ativan 2mg oral Q6HR PRN for agitation and anxiety.  Haldol 5mg oral Q6HR PRN for agitation.   Benadryl 50mg oral Q6HR PRN for agitation.     >Labs: Admission labs reviewed, no acute findings. U-tox negative, VPA level 61.40.   Hold antipsychotics if QTc >500  >Medical: S/P abdomen lacerations.  No consultations needed at this time. No indication for CIWA. Patient with consistently stable VS, no visible physical symptoms of withdrawal.   During the course of treatment, will collaborate with medical team to manage medical issues.  >Diet: Regular  >Social: milieu/structured therapy  >Treatment Interventions: Groups and Individual Therapy/CBT, Motivational counseling for substance abuse related issues.   >Dispo: Collateral and dispo planning pending further symptom and medication optimization

## 2023-06-13 NOTE — BH INPATIENT PSYCHIATRY ASSESSMENT NOTE - CURRENT MEDICATION
MEDICATIONS  (STANDING):    MEDICATIONS  (PRN):   MEDICATIONS  (STANDING):  atorvastatin 10 milliGRAM(s) Oral at bedtime  bacitracin   Ointment 1 Application(s) Topical two times a day  dextrose 5%. 1000 milliLiter(s) (50 mL/Hr) IV Continuous <Continuous>  dextrose 5%. 1000 milliLiter(s) (100 mL/Hr) IV Continuous <Continuous>  dextrose 50% Injectable 12.5 Gram(s) IV Push once  dextrose 50% Injectable 25 Gram(s) IV Push once  divalproex  milliGRAM(s) Oral two times a day  ferrous    sulfate 325 milliGRAM(s) Oral at bedtime  gabapentin 400 milliGRAM(s) Oral two times a day  glucagon  Injectable 1 milliGRAM(s) IntraMuscular once  guanFACINE. 3 milliGRAM(s) Oral at bedtime  ibuprofen  Tablet. 600 milliGRAM(s) Oral three times a day  insulin lispro (ADMELOG) corrective regimen sliding scale   SubCutaneous three times a day before meals  levothyroxine 50 MICROGram(s) Oral daily  metFORMIN 1000 milliGRAM(s) Oral daily  mirtazapine 15 milliGRAM(s) Oral at bedtime  risperiDONE   Tablet 4 milliGRAM(s) Oral two times a day  tamsulosin 0.4 milliGRAM(s) Oral at bedtime    MEDICATIONS  (PRN):  chlorproMAZINE    Injectable 50 milliGRAM(s) IntraMuscular once PRN severe aggression  chlorproMAZINE    Tablet 50 milliGRAM(s) Oral every 6 hours PRN aggression  dextrose Oral Gel 15 Gram(s) Oral once PRN Blood Glucose LESS THAN 70 milliGRAM(s)/deciliter  LORazepam     Tablet 2 milliGRAM(s) Oral every 4 hours PRN aggression  LORazepam   Injectable 3 milliGRAM(s) IntraMuscular once PRN severe agitation

## 2023-06-13 NOTE — BH PATIENT PROFILE - FALL HARM RISK - UNIVERSAL INTERVENTIONS
Bed in lowest position, wheels locked, appropriate side rails in place/Call bell, personal items and telephone in reach/Instruct patient to call for assistance before getting out of bed or chair/Non-slip footwear when patient is out of bed/Universal to call system/Physically safe environment - no spills, clutter or unnecessary equipment/Purposeful Proactive Rounding/Room/bathroom lighting operational, light cord in reach

## 2023-06-14 PROCEDURE — 99239 HOSP IP/OBS DSCHRG MGMT >30: CPT

## 2023-06-14 RX ORDER — BACITRACIN ZINC 500 UNIT/G
1 OINTMENT IN PACKET (EA) TOPICAL
Refills: 0 | Status: DISCONTINUED | OUTPATIENT
Start: 2023-06-14 | End: 2023-06-23

## 2023-06-14 RX ORDER — IBUPROFEN 200 MG
600 TABLET ORAL THREE TIMES A DAY
Refills: 0 | Status: COMPLETED | OUTPATIENT
Start: 2023-06-14 | End: 2023-06-16

## 2023-06-14 RX ORDER — FERROUS SULFATE 325(65) MG
325 TABLET ORAL AT BEDTIME
Refills: 0 | Status: DISCONTINUED | OUTPATIENT
Start: 2023-06-15 | End: 2023-06-23

## 2023-06-14 RX ORDER — GUANFACINE 3 MG/1
3 TABLET, EXTENDED RELEASE ORAL AT BEDTIME
Refills: 0 | Status: DISCONTINUED | OUTPATIENT
Start: 2023-06-14 | End: 2023-06-16

## 2023-06-14 RX ADMIN — Medication 1 APPLICATION(S): at 20:30

## 2023-06-14 RX ADMIN — DIVALPROEX SODIUM 500 MILLIGRAM(S): 500 TABLET, DELAYED RELEASE ORAL at 08:28

## 2023-06-14 RX ADMIN — MIRTAZAPINE 15 MILLIGRAM(S): 45 TABLET, ORALLY DISINTEGRATING ORAL at 20:32

## 2023-06-14 RX ADMIN — RISPERIDONE 4 MILLIGRAM(S): 4 TABLET ORAL at 08:29

## 2023-06-14 RX ADMIN — GABAPENTIN 400 MILLIGRAM(S): 400 CAPSULE ORAL at 20:32

## 2023-06-14 RX ADMIN — ATORVASTATIN CALCIUM 10 MILLIGRAM(S): 80 TABLET, FILM COATED ORAL at 20:31

## 2023-06-14 RX ADMIN — METFORMIN HYDROCHLORIDE 1000 MILLIGRAM(S): 850 TABLET ORAL at 08:28

## 2023-06-14 RX ADMIN — GUANFACINE 3 MILLIGRAM(S): 3 TABLET, EXTENDED RELEASE ORAL at 20:31

## 2023-06-14 RX ADMIN — DIVALPROEX SODIUM 500 MILLIGRAM(S): 500 TABLET, DELAYED RELEASE ORAL at 20:32

## 2023-06-14 RX ADMIN — Medication 325 MILLIGRAM(S): at 08:28

## 2023-06-14 RX ADMIN — Medication 600 MILLIGRAM(S): at 20:31

## 2023-06-14 RX ADMIN — Medication 50 MICROGRAM(S): at 08:28

## 2023-06-14 RX ADMIN — RISPERIDONE 4 MILLIGRAM(S): 4 TABLET ORAL at 20:31

## 2023-06-14 RX ADMIN — TAMSULOSIN HYDROCHLORIDE 0.4 MILLIGRAM(S): 0.4 CAPSULE ORAL at 20:31

## 2023-06-14 RX ADMIN — Medication 600 MILLIGRAM(S): at 21:28

## 2023-06-14 RX ADMIN — GABAPENTIN 400 MILLIGRAM(S): 400 CAPSULE ORAL at 08:28

## 2023-06-14 NOTE — BH INPATIENT PSYCHIATRY PROGRESS NOTE - NSBHCHARTREVIEWVS_PSY_A_CORE FT
Vital Signs Last 24 Hrs  T(C): 36.6 (06-13-23 @ 19:05), Max: 36.7 (06-13-23 @ 11:15)  T(F): 97.9 (06-13-23 @ 19:05), Max: 98 (06-13-23 @ 11:15)  HR: 85 (06-13-23 @ 11:15) (85 - 85)  BP: 109/71 (06-13-23 @ 11:15) (109/71 - 109/71)  BP(mean): --  RR: 16 (06-13-23 @ 13:20) (16 - 17)  SpO2: 100% (06-13-23 @ 13:20) (100% - 100%)    Orthostatic VS  06-13-23 @ 19:05  Lying BP: --/-- HR: --  Sitting BP: 116/73 HR: 91  Standing BP: 119/66 HR: 106  Site: --  Mode: electronic  Orthostatic VS  06-13-23 @ 13:20  Lying BP: --/-- HR: --  Sitting BP: 115/59 HR: 88  Standing BP: 111/74 HR: 97  Site: --  Mode: --   Vital Signs Last 24 Hrs  T(C): 35.7 (06-14-23 @ 08:27), Max: 36.6 (06-13-23 @ 19:05)  T(F): 96.3 (06-14-23 @ 08:27), Max: 97.9 (06-13-23 @ 19:05)  HR: --  BP: --  BP(mean): --  RR: 16 (06-13-23 @ 13:20) (16 - 16)  SpO2: 100% (06-13-23 @ 13:20) (100% - 100%)    Orthostatic VS  06-14-23 @ 08:27  Lying BP: --/-- HR: --  Sitting BP: 90/62 HR: 73  Standing BP: 101/74 HR: 71  Site: --  Mode: --  Orthostatic VS  06-13-23 @ 19:05  Lying BP: --/-- HR: --  Sitting BP: 116/73 HR: 91  Standing BP: 119/66 HR: 106  Site: --  Mode: electronic  Orthostatic VS  06-13-23 @ 13:20  Lying BP: --/-- HR: --  Sitting BP: 115/59 HR: 88  Standing BP: 111/74 HR: 97  Site: --  Mode: --   Vital Signs Last 24 Hrs  T(C): 35.7 (06-14-23 @ 08:27), Max: 36.6 (06-13-23 @ 19:05)  T(F): 96.3 (06-14-23 @ 08:27), Max: 97.9 (06-13-23 @ 19:05)  HR: --  BP: --  BP(mean): --  RR: --  SpO2: --    Orthostatic VS  06-14-23 @ 08:27  Lying BP: --/-- HR: --  Sitting BP: 90/62 HR: 73  Standing BP: 101/74 HR: 71  Site: --  Mode: --  Orthostatic VS  06-13-23 @ 19:05  Lying BP: --/-- HR: --  Sitting BP: 116/73 HR: 91  Standing BP: 119/66 HR: 106  Site: --  Mode: electronic  Orthostatic VS  06-13-23 @ 13:20  Lying BP: --/-- HR: --  Sitting BP: 115/59 HR: 88  Standing BP: 111/74 HR: 97  Site: --  Mode: --   Vital Signs Last 24 Hrs  T(C): 36.4 (06-24-23 @ 06:20), Max: 36.9 (06-23-23 @ 12:00)  T(F): 97.5 (06-24-23 @ 06:20), Max: 98.4 (06-23-23 @ 12:00)  HR: 78 (06-24-23 @ 06:20) (62 - 88)  BP: 107/68 (06-24-23 @ 06:20) (96/72 - 142/90)  BP(mean): --  RR: 19 (06-24-23 @ 06:20) (17 - 20)  SpO2: 97% (06-24-23 @ 06:20) (97% - 100%)

## 2023-06-14 NOTE — BH INPATIENT PSYCHIATRY DISCHARGE NOTE - VIOLENCE RISK FACTORS:
Feeling of being under threat and being unable to control threat/Affective dysregulation/Impulsivity/Command hallucinations for violent behavior/History of being victimized/traumatized/Lack of insight into violence risk/need for treatment/Noncompliance with treatment/Community stressors that increase the risk of destabilization/Irritability

## 2023-06-14 NOTE — BH INPATIENT PSYCHIATRY DISCHARGE NOTE - NSBHMETABOLIC_PSY_ALL_CORE_FT
BMI: BMI (kg/m2): 41 (06-13-23 @ 13:20)  HbA1c: A1C with Estimated Average Glucose Result: 6.5 % (05-31-23 @ 09:26)    Glucose: POCT Blood Glucose.: 85 mg/dL (06-13-23 @ 06:02)    BP: 109/71 (06-13-23 @ 11:15) (106/73 - 118/79)  Lipid Panel: Date/Time: 05-31-23 @ 09:26  Cholesterol, Serum: 128  Direct LDL: --  HDL Cholesterol, Serum: 31  Total Cholesterol/HDL Ration Measurement: --  Triglycerides, Serum: 157

## 2023-06-14 NOTE — BH INPATIENT PSYCHIATRY DISCHARGE NOTE - NSDCMRMEDTOKEN_GEN_ALL_CORE_FT
atorvastatin 10 mg oral tablet: 1 tab(s) orally once a day (at bedtime)  divalproex sodium 500 mg oral delayed release tablet: 1 tab(s) orally 2 times a day  ferrous sulfate 325 mg (65 mg elemental iron) oral tablet: 1 tab(s) orally once a day (at bedtime)  gabapentin 400 mg oral capsule: 1 cap(s) orally 2 times a day  guanFACINE 2 mg oral tablet: 1 tab(s) orally once a day  levothyroxine 50 mcg (0.05 mg) oral tablet: 1 tab(s) orally once a day  loratadine 10 mg oral tablet: 1 tab(s) orally once a day  Melatonin 5 mg oral tablet: 1 tab(s) orally once a day (at bedtime)  metFORMIN 1000 mg oral tablet: 1 tab(s) orally 2 times a day (with meals)  mirtazapine 15 mg oral tablet: 1 tab(s) orally once a day (at bedtime)  pantoprazole 40 mg oral delayed release tablet: 1 tab(s) orally once a day  risperiDONE 4 mg oral tablet: 1 tab(s) orally 2 times a day  senna leaf extract oral tablet: 2 tab(s) orally once a day (at bedtime)  tamsulosin 0.4 mg oral capsule: 1 cap(s) orally once a day (at bedtime)

## 2023-06-14 NOTE — BH INPATIENT PSYCHIATRY PROGRESS NOTE - NSBHMETABOLIC_PSY_ALL_CORE_FT
BMI: BMI (kg/m2): 41 (06-13-23 @ 13:20)  HbA1c: A1C with Estimated Average Glucose Result: 6.5 % (05-31-23 @ 09:26)    Glucose: POCT Blood Glucose.: 85 mg/dL (06-13-23 @ 06:02)    BP: 109/71 (06-13-23 @ 11:15) (106/73 - 118/79)  Lipid Panel: Date/Time: 05-31-23 @ 09:26  Cholesterol, Serum: 128  Direct LDL: --  HDL Cholesterol, Serum: 31  Total Cholesterol/HDL Ration Measurement: --  Triglycerides, Serum: 157   BMI: BMI (kg/m2): 41.3 (06-24-23 @ 00:30)  HbA1c: A1C with Estimated Average Glucose Result: 6.7 % (06-24-23 @ 06:10)    Glucose: POCT Blood Glucose.: 106 mg/dL (06-24-23 @ 09:14)    BP: 98/70 (06-22-23 @ 19:29) (98/70 - 111/88)  Lipid Panel: Date/Time: 06-24-23 @ 06:10  Cholesterol, Serum: 118  Direct LDL: --  HDL Cholesterol, Serum: 25  Total Cholesterol/HDL Ration Measurement: --  Triglycerides, Serum: 155

## 2023-06-14 NOTE — PSYCHIATRIC REHAB INITIAL EVALUATION - NSBHPRRECOMMEND_PSY_ALL_CORE
Writer attempted to meet with patient several in order to orient patient to unit, however patient was asleep; therefore, the following information will be obtained from chart. Patient will be provided with a copy of unit schedule at a later time. Chart reports patient is currently admitted after presenting to ED with self-inflicted lacerations to abdomen in the context of CAH telling patient to harm self and others. Upon arrival to [facility], patient denies active SI/HI, however states that he will hurt self if he returns to group home. As patient has engaged in SIB, psych rehab staff will work with patient on identifying two to three positive coping skills for increased symptom management. Psychiatric rehabilitation staff will engage patient daily.

## 2023-06-14 NOTE — DIETITIAN INITIAL EVALUATION ADULT - PERTINENT MEDS FT
MEDICATIONS  (STANDING):  atorvastatin 10 milliGRAM(s) Oral at bedtime  divalproex  milliGRAM(s) Oral two times a day  gabapentin 400 milliGRAM(s) Oral two times a day  guanFACINE. 3 milliGRAM(s) Oral at bedtime  levothyroxine 50 MICROGram(s) Oral daily  metFORMIN 1000 milliGRAM(s) Oral daily  mirtazapine 15 milliGRAM(s) Oral at bedtime  risperiDONE   Tablet 4 milliGRAM(s) Oral two times a day  tamsulosin 0.4 milliGRAM(s) Oral at bedtime

## 2023-06-14 NOTE — BH INPATIENT PSYCHIATRY DISCHARGE NOTE - NSBHDCMEDICALFT_PSY_A_CORE
Patient is diagnosed with: asthma, DM, urinary retention, GERD, BPH, hypothyroidism, HLD, HTN, and b/l myopia,    At the time of this treatment summary, the patient has not had any acute medical changes during his hospitalization. There have been no medical consultations. Patient was discharge with COVID 19 negative results. No cough, SOB, CP, or fever at time of discharge. Vitals WNL.

## 2023-06-14 NOTE — PSYCHIATRIC REHAB INITIAL EVALUATION - NSBHPRTOOLBOX_PSY_ALL_CORE
Unable to assess at this time. Pt appears stated age, alert, awake, oriented and in no acute physical distress. Dressed in casual clothes, fair grooming and hygiene. No agitation/EPS/retardation noted. Cooperative, makes eye contact. Mood is tired, depressed, anxious, affect is dysphoric and anxious, congruent. Thoughts are linear, denies any SI/HI, no gross delusions expressed now. No hallucination elicited. Insight and judgment are impaired.

## 2023-06-14 NOTE — BH INPATIENT PSYCHIATRY DISCHARGE NOTE - NSBHASSESSSUMMFT_PSY_ALL_CORE
Pt is a 34 yo M, single, disabled, non-caregiver, resides at a [facility], with psych h/o moderate ID, ASD, impulse control disorder, factitious disorder, HAVEN, mood disorders, PTSD and ADHD, multiple past psych admissions (last known [facility] 12/19-12/23/21), numerous ED visits for both medical/psych complaints, longstanding h/o SIB (head banging, cutting), but no known SA, longstanding h/o endorsing SI, h/o aggression toward staff at , PMH significant for asthma, DM, urinary retention, GERD, BPH, hypothyroidism, HLD, HTN, and b/l myopia, on Depakote 500 mg BID, Guanfacine 2 mg, Risperdal 4 mg, and Remeron 15 mg, prescribed by his PCP, who presents to the ED for suicidal and homicidal thoughts to kill self and kill  staff.    On assessment, pt presents with concrete thought process.  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  He endorses that he would kick staff members or would stab himself.  Patient is unable to engage in safety planning.  Patient is unable to identify any specific trigger for why these thoughts have surfaced today. He reported that feel like his medications are not working. He endorses depressed mood, his affect is flat and nonreactive.  Denies manic symptoms.   Denies auditory or visual hallucinations, denies paranoid ideations. Denies alcohol/substance     On DC   33 year old single male, domiciled at [facility]. Patient has past psychiatric history of moderate ID, ASD, impulse control disorder, factitious disorder, HAVEN, mood disorders, PTSD and ADHD.  Patient has extensive history of inpatient hospitalization and ED visits. Most recently patient was discharged from [facility] on 6/6/23.  Patient is re-hospitalized with a primary problem self inflicting laceration to abdomen, CAH to hurt self and others.  Patient admitted to [facility] on a 9.27 legal status.   Patient requires inpatient hospitalization due to symptoms of mental illness so severe that they significantly interfere with activities of daily living, and presents a potential danger to self as a result of acute decompensation with s/p  SA,  he is requiring 24-hour care at this time as a result, for psychiatric stabilization and safety.     Plan:  >Legal: 9.27  >Obs: Continue CO for SIB  >Psychiatric Meds:   -Depakote 1500mg qhs   - Intuniv ER 4mg,   -Risperdal 4mg BID,   -Remeron 30mg qhs.   -begin to taper  klonopin 1mg qd from TID due to over sedation  -increase Gabapentin 400mgTID  Ativan 2mg oral Q6HR PRN for agitation and anxiety.  Haldol 5mg oral Q6HR PRN for agitation.   Benadryl 50mg oral Q6HR PRN for agitation.   >Labs:  labs reviewed, no acute findings. U-tox negative, VPA level 61.40. Xray to left shoulder and clavicle resulted unremarkable.  Hold antipsychotics if QTc >500  >Medical: S/P abdomen lacerations.  Medicine and wound care consultations needed.  Patient with consistently stable VS.  During the course of treatment, will collaborate with medical team to manage medical issues.  >Diet: Safety tray  >Consult: Wound consult and PT  >Social: milieu/structured therapy  >Treatment Interventions: Groups and Individual Therapy/CBT, Motivational counseling for substance abuse related issues.   >Dispo: Collateral and dispo planning pending further symptom and medication optimization

## 2023-06-14 NOTE — BH SOCIAL WORK INITIAL PSYCHOSOCIAL EVALUATION - NSBHABUSECOMMENTFT_PSY_ALL_CORE
Pt did not participate with interview and trauma assessment unable to be conducted. Pt has a documented hx of aggression and violence in the context of psychiatric decompensation.

## 2023-06-14 NOTE — PHARMACOTHERAPY INTERVENTION NOTE - COMMENTS
In monitoring patients on levothyroxine for drug-drug interactions and timing of administration, identified patient with an order for ferrous sulfate scheduled for daily at 0900. Levothyroxine defaults to daily at 0600. Concurrent use of levothyroxine and iron-, aluminum-, calcium- or magnesium-containing products may result in decreased levothyroxine absorption and must be  from levothyroxine by at least 4 hours.     Recommended adjusting dosing schedule for ferrous sulfate to with dinner or at bedtime to avoid the interaction, as clinically appropriate.    Christiana Rosa, PharmD, BCPP

## 2023-06-14 NOTE — PSYCHIATRIC REHAB INITIAL EVALUATION - NSBHALCSUBTREAT_PSY_ALL_CORE
Pt was recently discharged from [facility] on 05/05/2023 and is intermittently compliant with medication.

## 2023-06-14 NOTE — BH INPATIENT PSYCHIATRY PROGRESS NOTE - PRN MEDS
MEDICATIONS  (PRN):  chlorproMAZINE    Injectable 50 milliGRAM(s) IntraMuscular once PRN severe aggression  chlorproMAZINE    Tablet 50 milliGRAM(s) Oral every 6 hours PRN aggression  LORazepam     Tablet 2 milliGRAM(s) Oral every 4 hours PRN aggression  LORazepam   Injectable 3 milliGRAM(s) IntraMuscular once PRN severe agitation   MEDICATIONS  (PRN):

## 2023-06-14 NOTE — BH INPATIENT PSYCHIATRY PROGRESS NOTE - CURRENT MEDICATION
MEDICATIONS  (STANDING):  atorvastatin 10 milliGRAM(s) Oral at bedtime  divalproex  milliGRAM(s) Oral two times a day  ferrous    sulfate 325 milliGRAM(s) Oral daily  gabapentin 400 milliGRAM(s) Oral two times a day  levothyroxine 50 MICROGram(s) Oral daily  metFORMIN 1000 milliGRAM(s) Oral daily  mirtazapine 15 milliGRAM(s) Oral at bedtime  risperiDONE   Tablet 4 milliGRAM(s) Oral two times a day  tamsulosin 0.4 milliGRAM(s) Oral at bedtime    MEDICATIONS  (PRN):  chlorproMAZINE    Injectable 50 milliGRAM(s) IntraMuscular once PRN severe aggression  chlorproMAZINE    Tablet 50 milliGRAM(s) Oral every 6 hours PRN aggression  LORazepam     Tablet 2 milliGRAM(s) Oral every 4 hours PRN aggression  LORazepam   Injectable 3 milliGRAM(s) IntraMuscular once PRN severe agitation   MEDICATIONS  (STANDING):  atorvastatin 10 milliGRAM(s) Oral at bedtime  divalproex  milliGRAM(s) Oral two times a day  ferrous    sulfate 325 milliGRAM(s) Oral daily  gabapentin 400 milliGRAM(s) Oral two times a day  guanFACINE. 3 milliGRAM(s) Oral at bedtime  levothyroxine 50 MICROGram(s) Oral daily  metFORMIN 1000 milliGRAM(s) Oral daily  mirtazapine 15 milliGRAM(s) Oral at bedtime  risperiDONE   Tablet 4 milliGRAM(s) Oral two times a day  tamsulosin 0.4 milliGRAM(s) Oral at bedtime    MEDICATIONS  (PRN):  chlorproMAZINE    Injectable 50 milliGRAM(s) IntraMuscular once PRN severe aggression  chlorproMAZINE    Tablet 50 milliGRAM(s) Oral every 6 hours PRN aggression  LORazepam     Tablet 2 milliGRAM(s) Oral every 4 hours PRN aggression  LORazepam   Injectable 3 milliGRAM(s) IntraMuscular once PRN severe agitation   MEDICATIONS  (STANDING):  atorvastatin 10 milliGRAM(s) Oral at bedtime  divalproex  milliGRAM(s) Oral two times a day  gabapentin 400 milliGRAM(s) Oral two times a day  guanFACINE. 3 milliGRAM(s) Oral at bedtime  levothyroxine 50 MICROGram(s) Oral daily  metFORMIN 1000 milliGRAM(s) Oral daily  mirtazapine 15 milliGRAM(s) Oral at bedtime  risperiDONE   Tablet 4 milliGRAM(s) Oral two times a day  tamsulosin 0.4 milliGRAM(s) Oral at bedtime    MEDICATIONS  (PRN):  chlorproMAZINE    Injectable 50 milliGRAM(s) IntraMuscular once PRN severe aggression  chlorproMAZINE    Tablet 50 milliGRAM(s) Oral every 6 hours PRN aggression  LORazepam     Tablet 2 milliGRAM(s) Oral every 4 hours PRN aggression  LORazepam   Injectable 3 milliGRAM(s) IntraMuscular once PRN severe agitation   MEDICATIONS  (STANDING):    MEDICATIONS  (PRN):

## 2023-06-14 NOTE — DIETITIAN INITIAL EVALUATION ADULT - OTHER INFO
Pt is a 32 y/o male with PPHx of Intellectual disability, ASD, Impulse control disorder, Factitious disorder, HAVEN, Mood disorder, PTSD, ADHD. Medical history pertinent for GERD, Obesity, Hyperlipidemia, Hypothyroidism, Type 2 diabetes. Pt recently discharged from [facility] on 6/6/23. Pt admitted to [facility] with Primary problem of self inflicting laceration to abdomen and CAH. Saw Pt in his room. Reports good appetite/po intake at present. No GI distress noted. Pt verbalized food preferences, will implement. Pt declined diet education at this time. NKFA.

## 2023-06-14 NOTE — BH INPATIENT PSYCHIATRY DISCHARGE NOTE - NSBHFUPINTERVALHXFT_PSY_A_CORE
Patient is discussed in treatment  team.  Discussed with SW regarding dischargingc patient back to residence.  Per SW, residence was contacted yesterday, per residence they are requesting a dischargec meeting, pts mother wants to be present.  Meeting is scheduled for Monday 6/26  Per nursing CAROL was paged last night due to change in mental status, lethargy and minimal responsive to tactile stimulation with sternal rub. Pt unable to answerer ROS. P.E. no focal deficits. He was transfer to the ED for further evaluation.      Today writer called ED and spoke with attending in ED Dr. Retana who reported that patient has pneumonia.  He was started on IV antibiotics in ED and will be returning to unit, will continue po antibiotics.  Writer also spoke to hospitalist Dr. Jones who reported that he received report from ED and is aware that patient is returning to unit. Per Dr. Jones he will follow up with patient upon return.    Discussed with nursing team to increase ambulation with patient upon return to unit.

## 2023-06-14 NOTE — BH INPATIENT PSYCHIATRY DISCHARGE NOTE - NSBHDCHANDOFFFT_PSY_ALL_CORE
Medication list and discharge summary included discussion of hospital course, treatment, and medications, with phone number left for call back provided to outpatient Psychiatrist at …  Writer's contact information was provided for any further questions or concerns to contact Vida Salmeron NP at [phone].  Patient admitted  medically for pnuemonia
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
## 2023-06-14 NOTE — CHART NOTE - NSCHARTNOTEFT_GEN_A_CORE
Called by primary team in regards wound check.   s/p self inflicted glass cut on his abdomen 6/12/23, superficial laceration  Abdominal wound examined: uncovered superficial wound, healing, no discharge, minimal erythema.   -no signs of infection, no sutures needed  -patient needs better hygiene- clean area gently with water and soap  -provide local wound care Called by primary team in regards wound check.   s/p self inflicted glass cut on his abdomen 6/12/23, superficial abrasion/laceration  Abdominal wound examined: uncovered superficial wound, healing, no discharge, minimal erythema.   -no signs of infection, no sutures needed  -patient needs better hygiene- clean area gently with water and soap  -provide local wound care
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
## 2023-06-14 NOTE — BH INPATIENT PSYCHIATRY PROGRESS NOTE - NSBHASSESSSUMMFT_PSY_ALL_CORE
33 year old single male, domiciled at [facility]. Patient has past psychiatric history of moderate ID, ASD, impulse control disorder, factitious disorder, HAVEN, mood disorders, PTSD and ADHD.  Patient has extensive history of inpatient hospitalization and ED visits. Most recently patient was discharged from [facility] on 6/6/23.  Patient is re-hospitalized with a primary problem self inflicting laceration to abdomen, CAH to hurt self and others.  Patient admitted to [facility] on a 9.27 legal status.   Patient requires inpatient hospitalization due to symptoms of mental illness so severe that they significantly interfere with activities of daily living, and presents a potential danger to self as a result of acute decompensation with s/p  SA,  he is requiring 24-hour care at this time as a result, for psychiatric stabilization and safety.     Plan:  >Legal: 9.27  >Obs: Routine, no current SI. no need for CO, patient not expected to pose risk to self or others in controlled inpatient setting  >Psychiatric Meds: Restart outpatient medication regimen: Depakote 500mg BID, Guanfacine 2mg, Risoerdal 4mg BID, Remeron 15mg qhs. Observe for tolerability and efficacy. Patient had been poorly adherent prior to admission.   PRN medications:  Ativan 2mg oral Q6HR PRN for agitation and anxiety.  Haldol 5mg oral Q6HR PRN for agitation.   Benadryl 50mg oral Q6HR PRN for agitation.     >Labs: Admission labs reviewed, no acute findings. U-tox negative, VPA level 61.40.   Hold antipsychotics if QTc >500  >Medical: S/P abdomen lacerations.  No consultations needed at this time. No indication for CIWA. Patient with consistently stable VS, no visible physical symptoms of withdrawal.   During the course of treatment, will collaborate with medical team to manage medical issues.  >Diet: Regular  >Social: milieu/structured therapy  >Treatment Interventions: Groups and Individual Therapy/CBT, Motivational counseling for substance abuse related issues.   >Dispo: Collateral and dispo planning pending further symptom and medication optimization

## 2023-06-14 NOTE — BH INPATIENT PSYCHIATRY DISCHARGE NOTE - ATTENDING DISCHARGE PHYSICAL EXAMINATION:
Thu 6/22 late afternoon patient, appearing somewhat tired, VS wnl, sitting upright in chair in his room, alert and oriented, anxious, no evidence of response to internal stimuli or overt delusions, generally feeling calmer, trial off CO maintaining behavioral control.     CAROL was paged overnight due to change in mental status, lethargy and minimal responsive to tactile stimulation with sternal rub. Pt unable to answerer ROS. P.E. no focal deficits. He was transferred to the ED for further evaluation, admitted to [facility] for treatment of PNA and UTI.

## 2023-06-14 NOTE — BH INPATIENT PSYCHIATRY PROGRESS NOTE - NSBHFUPINTERVALHXFT_PSY_A_CORE
Patient was seen and evaluated, chart reviewed. Case discussed with nursing team.  On service for this 33 year old single male, domiciled at [facility]. Patient has past psychiatric history of moderate ID, ASD, impulse control disorder, factitious disorder, HAVEN, mood disorders, PTSD and ADHD.  Patient has extensive history of inpatient hospitalization and ED visits. Most recently patient was discharged from [facility] on 6/6/23.  Patient is re-hospitalized with a primary problem self inflicting laceration to abdomen, CAH to hurt self and others.    Patient is followed up for s/p suicide attempt via lacerations to abdomen.   Chart, medications and admission labs reviewed, with no acute findings.  Patient took all standing medications, no SE reported.   Patient is observed in his room.  He agrees to interview but not very engaging and is irritable. Pt demanding to be discharged and return to his residence. Pt reports that he is “pissed off “ upset that he is on finger foods (safety tray ordered).  He denies  SI/SIB/HI.  He denies active plan or intent on unit, and engages in safety planning.   Patient denies  CAH, paranoid thoughts, magical thinking, IOR, no VH, or delusions. Patient denies any symptoms suggestive of everett: (denied grandiosity/ racing thoughts/ increased goal directed activities or engaged in risk taking behavior/ no pressured speech/ no elevated mood/ denied any increased in energy level causing sleep disruption).   Writer spoke with medicine team (Dr. Gonzalez) regarding his lacerations.  Pt arrived with no sutures from ED.  Dr. Gonzalez will f/u.

## 2023-06-15 LAB
GLUCOSE BLDC GLUCOMTR-MCNC: 104 MG/DL — HIGH (ref 70–99)
GLUCOSE BLDC GLUCOMTR-MCNC: 106 MG/DL — HIGH (ref 70–99)
GLUCOSE BLDC GLUCOMTR-MCNC: 165 MG/DL — HIGH (ref 70–99)
GLUCOSE BLDC GLUCOMTR-MCNC: 209 MG/DL — HIGH (ref 70–99)

## 2023-06-15 PROCEDURE — 73030 X-RAY EXAM OF SHOULDER: CPT | Mod: 26,LT

## 2023-06-15 PROCEDURE — 73000 X-RAY EXAM OF COLLAR BONE: CPT | Mod: 26,LT

## 2023-06-15 PROCEDURE — 99232 SBSQ HOSP IP/OBS MODERATE 35: CPT

## 2023-06-15 RX ORDER — SODIUM CHLORIDE 9 MG/ML
1000 INJECTION, SOLUTION INTRAVENOUS
Refills: 0 | Status: DISCONTINUED | OUTPATIENT
Start: 2023-06-15 | End: 2023-06-20

## 2023-06-15 RX ORDER — DIVALPROEX SODIUM 500 MG/1
1000 TABLET, DELAYED RELEASE ORAL AT BEDTIME
Refills: 0 | Status: DISCONTINUED | OUTPATIENT
Start: 2023-06-15 | End: 2023-06-19

## 2023-06-15 RX ORDER — GLUCAGON INJECTION, SOLUTION 0.5 MG/.1ML
1 INJECTION, SOLUTION SUBCUTANEOUS ONCE
Refills: 0 | Status: DISCONTINUED | OUTPATIENT
Start: 2023-06-15 | End: 2023-06-23

## 2023-06-15 RX ORDER — DEXTROSE 50 % IN WATER 50 %
25 SYRINGE (ML) INTRAVENOUS ONCE
Refills: 0 | Status: DISCONTINUED | OUTPATIENT
Start: 2023-06-15 | End: 2023-06-15

## 2023-06-15 RX ORDER — DEXTROSE 50 % IN WATER 50 %
25 SYRINGE (ML) INTRAVENOUS ONCE
Refills: 0 | Status: DISCONTINUED | OUTPATIENT
Start: 2023-06-15 | End: 2023-06-20

## 2023-06-15 RX ORDER — INSULIN LISPRO 100/ML
VIAL (ML) SUBCUTANEOUS
Refills: 0 | Status: DISCONTINUED | OUTPATIENT
Start: 2023-06-15 | End: 2023-06-23

## 2023-06-15 RX ORDER — DIVALPROEX SODIUM 500 MG/1
500 TABLET, DELAYED RELEASE ORAL DAILY
Refills: 0 | Status: DISCONTINUED | OUTPATIENT
Start: 2023-06-16 | End: 2023-06-20

## 2023-06-15 RX ORDER — DEXTROSE 50 % IN WATER 50 %
15 SYRINGE (ML) INTRAVENOUS ONCE
Refills: 0 | Status: DISCONTINUED | OUTPATIENT
Start: 2023-06-15 | End: 2023-06-23

## 2023-06-15 RX ORDER — DEXTROSE 50 % IN WATER 50 %
12.5 SYRINGE (ML) INTRAVENOUS ONCE
Refills: 0 | Status: DISCONTINUED | OUTPATIENT
Start: 2023-06-15 | End: 2023-06-20

## 2023-06-15 RX ADMIN — Medication 325 MILLIGRAM(S): at 20:39

## 2023-06-15 RX ADMIN — DIVALPROEX SODIUM 500 MILLIGRAM(S): 500 TABLET, DELAYED RELEASE ORAL at 08:36

## 2023-06-15 RX ADMIN — GABAPENTIN 400 MILLIGRAM(S): 400 CAPSULE ORAL at 20:43

## 2023-06-15 RX ADMIN — Medication 50 MILLIGRAM(S): at 20:39

## 2023-06-15 RX ADMIN — TAMSULOSIN HYDROCHLORIDE 0.4 MILLIGRAM(S): 0.4 CAPSULE ORAL at 20:39

## 2023-06-15 RX ADMIN — RISPERIDONE 4 MILLIGRAM(S): 4 TABLET ORAL at 08:36

## 2023-06-15 RX ADMIN — Medication 1 APPLICATION(S): at 08:35

## 2023-06-15 RX ADMIN — Medication 50 MICROGRAM(S): at 07:24

## 2023-06-15 RX ADMIN — GUANFACINE 3 MILLIGRAM(S): 3 TABLET, EXTENDED RELEASE ORAL at 20:44

## 2023-06-15 RX ADMIN — GABAPENTIN 400 MILLIGRAM(S): 400 CAPSULE ORAL at 08:36

## 2023-06-15 RX ADMIN — DIVALPROEX SODIUM 1000 MILLIGRAM(S): 500 TABLET, DELAYED RELEASE ORAL at 20:38

## 2023-06-15 RX ADMIN — Medication 600 MILLIGRAM(S): at 09:15

## 2023-06-15 RX ADMIN — MIRTAZAPINE 15 MILLIGRAM(S): 45 TABLET, ORALLY DISINTEGRATING ORAL at 20:39

## 2023-06-15 RX ADMIN — Medication 600 MILLIGRAM(S): at 12:33

## 2023-06-15 RX ADMIN — Medication 1 APPLICATION(S): at 20:40

## 2023-06-15 RX ADMIN — METFORMIN HYDROCHLORIDE 1000 MILLIGRAM(S): 850 TABLET ORAL at 08:36

## 2023-06-15 RX ADMIN — Medication 600 MILLIGRAM(S): at 08:36

## 2023-06-15 RX ADMIN — ATORVASTATIN CALCIUM 10 MILLIGRAM(S): 80 TABLET, FILM COATED ORAL at 20:44

## 2023-06-15 RX ADMIN — RISPERIDONE 4 MILLIGRAM(S): 4 TABLET ORAL at 20:40

## 2023-06-15 NOTE — BH INPATIENT PSYCHIATRY PROGRESS NOTE - NSBHCHARTREVIEWVS_PSY_A_CORE FT
Vital Signs Last 24 Hrs  T(C): 37 (06-14-23 @ 19:19), Max: 37 (06-14-23 @ 19:19)  T(F): 98.6 (06-14-23 @ 19:19), Max: 98.6 (06-14-23 @ 19:19)  HR: --  BP: --  BP(mean): --  RR: 18 (06-14-23 @ 20:19) (18 - 18)  SpO2: --    Orthostatic VS  06-14-23 @ 19:19  Lying BP: --/-- HR: --  Sitting BP: 135/94 HR: 86  Standing BP: 130/92 HR: 108  Site: --  Mode: --  Orthostatic VS  06-14-23 @ 08:27  Lying BP: --/-- HR: --  Sitting BP: 90/62 HR: 73  Standing BP: 101/74 HR: 71  Site: --  Mode: --  Orthostatic VS  06-13-23 @ 19:05  Lying BP: --/-- HR: --  Sitting BP: 116/73 HR: 91  Standing BP: 119/66 HR: 106  Site: --  Mode: electronic  Orthostatic VS  06-13-23 @ 13:20  Lying BP: --/-- HR: --  Sitting BP: 115/59 HR: 88  Standing BP: 111/74 HR: 97  Site: --  Mode: --   Vital Signs Last 24 Hrs  T(C): 36.4 (06-24-23 @ 06:20), Max: 36.9 (06-23-23 @ 12:00)  T(F): 97.5 (06-24-23 @ 06:20), Max: 98.4 (06-23-23 @ 12:00)  HR: 78 (06-24-23 @ 06:20) (62 - 88)  BP: 107/68 (06-24-23 @ 06:20) (96/72 - 142/90)  BP(mean): --  RR: 19 (06-24-23 @ 06:20) (17 - 20)  SpO2: 97% (06-24-23 @ 06:20) (97% - 100%)

## 2023-06-15 NOTE — ADVANCED PRACTICE NURSE CONSULT - ASSESSMENT
Patient is 33 yrs old man with superficial laceration that he reported that he cut himself with a knife.   The wound is located on right side of the umbilicus no drainage, no pain erythema noted measuring 5 cm x 1.5 cm.  Patient was educated not to scratch area and keep area dry and clean.  Patient was also educated to drink water, eat fresh fruits instead of drinking  juice, to eat a balance meal.  Patient teach back he drinks orange juice every morning, two cups of soda daily and he does not like to drink water.      Patient is 33 yrs old man with superficial laceration that he reported that he cut himself with a knife.   The wound is located on right side of the umbilicus no drainage, no pain erythema noted measuring 5 cm x 1.5 cm x 0.01cm  Patient was educated not to scratch area and keep area dry and clean.  Patient was also educated to drink water, eat fresh fruits instead of drinking  juice, to eat a balance meal.  Patient teach back he drinks orange juice every morning, two cups of soda daily and he does not like to drink water.

## 2023-06-15 NOTE — BH INPATIENT PSYCHIATRY PROGRESS NOTE - CURRENT MEDICATION
MEDICATIONS  (STANDING):  atorvastatin 10 milliGRAM(s) Oral at bedtime  bacitracin   Ointment 1 Application(s) Topical two times a day  divalproex  milliGRAM(s) Oral two times a day  ferrous    sulfate 325 milliGRAM(s) Oral at bedtime  gabapentin 400 milliGRAM(s) Oral two times a day  guanFACINE. 3 milliGRAM(s) Oral at bedtime  ibuprofen  Tablet. 600 milliGRAM(s) Oral three times a day  levothyroxine 50 MICROGram(s) Oral daily  metFORMIN 1000 milliGRAM(s) Oral daily  mirtazapine 15 milliGRAM(s) Oral at bedtime  risperiDONE   Tablet 4 milliGRAM(s) Oral two times a day  tamsulosin 0.4 milliGRAM(s) Oral at bedtime    MEDICATIONS  (PRN):  chlorproMAZINE    Injectable 50 milliGRAM(s) IntraMuscular once PRN severe aggression  chlorproMAZINE    Tablet 50 milliGRAM(s) Oral every 6 hours PRN aggression  LORazepam     Tablet 2 milliGRAM(s) Oral every 4 hours PRN aggression  LORazepam   Injectable 3 milliGRAM(s) IntraMuscular once PRN severe agitation   MEDICATIONS  (STANDING):    MEDICATIONS  (PRN):

## 2023-06-15 NOTE — BH INPATIENT PSYCHIATRY PROGRESS NOTE - NSBHFUPINTERVALHXFT_PSY_A_CORE
Patient was seen and evaluated, chart, medications, and labs reviewed. Case discussed with treatment team.  No interval events. Patient  hospitalized for self inflicting laceration to abdomen, CAH to hurt self and others at this group home residence.  Patient remains compliant with all standing medications, no SE reported. Titrate Depakote 100mg BID, increase Guanfacine 3mg, continue Risperdal 4mg BID, Remeron 15mg qhs.  No po prns for aggression. Eating and sleeping well.  Patient is observed in his room.  He agrees to interview calmer and less irritable. Pt requested discharged and return to his residence. Pt  denies  depressed mood, denies anxiety, denies SI/SIB/HI.  He denies active plan or intent on unit, and engages in safety planning.  Patient was on utensil restriction on finger foods (safety tray) upon admission (treatment team had concerns that pt use utensils as a way to SIB on unit). Patient will be allowed a spoon.   Patient denies  CAH, paranoid thoughts, magical thinking, IOR, no VH, or delusions.   Writer spoke with medicine team (Dr. Gonzalez) yesterday regarding his lacerations.  Pt arrived with no sutures from ED.  Patient was seen by Dr. Gonzalez yesterday: per Dr. Rob's chart review: "Called by primary team in regards wound check. s/p self inflicted glass cut on his abdomen 6/12/23, superficial abrasion/laceration Abdominal wound examined: uncovered superficial wound, healing, no discharge, minimal erythema, no signs of infection, no sutures needed  -patient needs better hygiene- clean area gently with water and soap and provide local wound care".

## 2023-06-15 NOTE — BH INPATIENT PSYCHIATRY PROGRESS NOTE - NSBHASSESSSUMMFT_PSY_ALL_CORE
33 year old single male, domiciled at [facility]. Patient has past psychiatric history of moderate ID, ASD, impulse control disorder, factitious disorder, HAVEN, mood disorders, PTSD and ADHD.  Patient has extensive history of inpatient hospitalization and ED visits. Most recently patient was discharged from [facility] on 6/6/23.  Patient is re-hospitalized with a primary problem self inflicting laceration to abdomen, CAH to hurt self and others.  Patient admitted to [facility] on a 9.27 legal status.   Patient requires inpatient hospitalization due to symptoms of mental illness so severe that they significantly interfere with activities of daily living, and presents a potential danger to self as a result of acute decompensation with s/p  SA,  he is requiring 24-hour care at this time as a result, for psychiatric stabilization and safety.     Plan:  >Legal: 9.27  >Obs: Routine, no current SI. no need for CO, patient not expected to pose risk to self or others in controlled inpatient setting  >Psychiatric Meds: Increase Depakote 100mg BID, increase Guanfacine 3mg, Risperdal 4mg BID, Remeron 15mg qhs. Observe for tolerability and efficacy. Patient had been poorly adherent prior to admission.   PRN medications:  Ativan 2mg oral Q6HR PRN for agitation and anxiety.  Haldol 5mg oral Q6HR PRN for agitation.   Benadryl 50mg oral Q6HR PRN for agitation.     >Labs:  labs reviewed, no acute findings. U-tox negative, VPA level 61.40.   Hold antipsychotics if QTc >500  >Medical: S/P abdomen lacerations.  Medicine and wound care consultations needed.  Patient with consistently stable VS.  During the course of treatment, will collaborate with medical team to manage medical issues.  >Diet: Regular  >Social: milieu/structured therapy  >Treatment Interventions: Groups and Individual Therapy/CBT, Motivational counseling for substance abuse related issues.   >Dispo: Collateral and dispo planning pending further symptom and medication optimization

## 2023-06-16 LAB
GLUCOSE BLDC GLUCOMTR-MCNC: 112 MG/DL — HIGH (ref 70–99)
GLUCOSE BLDC GLUCOMTR-MCNC: 115 MG/DL — HIGH (ref 70–99)
GLUCOSE BLDC GLUCOMTR-MCNC: 117 MG/DL — HIGH (ref 70–99)
GLUCOSE BLDC GLUCOMTR-MCNC: 86 MG/DL — SIGNIFICANT CHANGE UP (ref 70–99)

## 2023-06-16 PROCEDURE — 99232 SBSQ HOSP IP/OBS MODERATE 35: CPT

## 2023-06-16 RX ORDER — GUANFACINE 3 MG/1
4 TABLET, EXTENDED RELEASE ORAL AT BEDTIME
Refills: 0 | Status: DISCONTINUED | OUTPATIENT
Start: 2023-06-16 | End: 2023-06-23

## 2023-06-16 RX ORDER — ALBUTEROL 90 UG/1
2 AEROSOL, METERED ORAL EVERY 6 HOURS
Refills: 0 | Status: DISCONTINUED | OUTPATIENT
Start: 2023-06-16 | End: 2023-06-23

## 2023-06-16 RX ORDER — MIRTAZAPINE 45 MG/1
30 TABLET, ORALLY DISINTEGRATING ORAL AT BEDTIME
Refills: 0 | Status: DISCONTINUED | OUTPATIENT
Start: 2023-06-16 | End: 2023-06-20

## 2023-06-16 RX ORDER — DIPHENHYDRAMINE HCL 50 MG
50 CAPSULE ORAL ONCE
Refills: 0 | Status: DISCONTINUED | OUTPATIENT
Start: 2023-06-16 | End: 2023-06-19

## 2023-06-16 RX ADMIN — GABAPENTIN 400 MILLIGRAM(S): 400 CAPSULE ORAL at 09:54

## 2023-06-16 RX ADMIN — Medication 325 MILLIGRAM(S): at 20:39

## 2023-06-16 RX ADMIN — MIRTAZAPINE 30 MILLIGRAM(S): 45 TABLET, ORALLY DISINTEGRATING ORAL at 20:38

## 2023-06-16 RX ADMIN — GABAPENTIN 400 MILLIGRAM(S): 400 CAPSULE ORAL at 20:38

## 2023-06-16 RX ADMIN — Medication 1 APPLICATION(S): at 10:01

## 2023-06-16 RX ADMIN — Medication 50 MICROGRAM(S): at 06:50

## 2023-06-16 RX ADMIN — ALBUTEROL 2 PUFF(S): 90 AEROSOL, METERED ORAL at 20:37

## 2023-06-16 RX ADMIN — Medication 2 MILLIGRAM(S): at 21:04

## 2023-06-16 RX ADMIN — TAMSULOSIN HYDROCHLORIDE 0.4 MILLIGRAM(S): 0.4 CAPSULE ORAL at 20:39

## 2023-06-16 RX ADMIN — Medication 50 MILLIGRAM(S): at 20:38

## 2023-06-16 RX ADMIN — DIVALPROEX SODIUM 1000 MILLIGRAM(S): 500 TABLET, DELAYED RELEASE ORAL at 20:39

## 2023-06-16 RX ADMIN — METFORMIN HYDROCHLORIDE 1000 MILLIGRAM(S): 850 TABLET ORAL at 09:54

## 2023-06-16 RX ADMIN — DIVALPROEX SODIUM 500 MILLIGRAM(S): 500 TABLET, DELAYED RELEASE ORAL at 09:59

## 2023-06-16 RX ADMIN — ATORVASTATIN CALCIUM 10 MILLIGRAM(S): 80 TABLET, FILM COATED ORAL at 20:39

## 2023-06-16 RX ADMIN — GUANFACINE 4 MILLIGRAM(S): 3 TABLET, EXTENDED RELEASE ORAL at 20:37

## 2023-06-16 RX ADMIN — RISPERIDONE 4 MILLIGRAM(S): 4 TABLET ORAL at 20:38

## 2023-06-16 RX ADMIN — Medication 1 APPLICATION(S): at 20:38

## 2023-06-16 RX ADMIN — RISPERIDONE 4 MILLIGRAM(S): 4 TABLET ORAL at 09:54

## 2023-06-16 NOTE — BH INPATIENT PSYCHIATRY PROGRESS NOTE - PRN MEDS
MEDICATIONS  (PRN):  chlorproMAZINE    Injectable 50 milliGRAM(s) IntraMuscular once PRN severe aggression  chlorproMAZINE    Tablet 50 milliGRAM(s) Oral every 6 hours PRN aggression  dextrose Oral Gel 15 Gram(s) Oral once PRN Blood Glucose LESS THAN 70 milliGRAM(s)/deciliter  LORazepam     Tablet 2 milliGRAM(s) Oral every 4 hours PRN aggression  LORazepam   Injectable 3 milliGRAM(s) IntraMuscular once PRN severe agitation   MEDICATIONS  (PRN):
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
## 2023-06-16 NOTE — BH TREATMENT PLAN - NSTXDCOTHRINTERSW_PSY_ALL_CORE
SW will provide support, insight, discharge planning, psycho-education, and maintain contact with identified supports.      Pt will return home and resume tx with his outpt providers.

## 2023-06-16 NOTE — BH CHART NOTE - NSEVENTNOTEFT_PSY_ALL_CORE
CAROL paged for chest discomfort for past 2 days. Upon clarification with nurse, patient states that he feels as though he is wheezing for past two days. Patient states that during wound check he was feeling this discomfort but team was focused on abdomen. Pt's vitals are all stable including O2 saturation. Ordered Benadryl PRN, ordered albuterol PRN. Signed out to PA.     CAROL paged for chest discomfort for past 2 days. Upon clarification with nurse, patient states that he feels as though he is wheezing for past two days. Patient states that during wound check he was feeling this discomfort but team was focused on abdomen. Pt's vitals are all stable including O2 saturation. Ordered Benadryl PRN, ordered albuterol PRN. Signed out to PA.      Vital Signs Last 24 Hrs  T(C): 35.7 (16 Jun 2023 08:24), Max: 36.6 (15 Sudhakar 2023 19:24)  T(F): 96.3 (16 Jun 2023 08:24), Max: 97.9 (15 Sudhakar 2023 19:24)  HR: 94 (16 Jun 2023 17:49) (94 - 94)  BP: 123/74 (16 Jun 2023 17:49) (123/74 - 123/74)  BP(mean): --  RR: 18 (16 Jun 2023 17:49) (18 - 18)  SpO2: 100% (16 Jun 2023 17:49) (100% - 100%)       CAROL paged for chest discomfort for past 2 days. Upon clarification with nurse, patient states that he feels as though he is wheezing for past two days. Patient states that during wound check he was feeling this discomfort but team was focused on abdomen. Pt's vitals are all stable including O2 saturation. Ordered Benadryl PRN, ordered albuterol PRN. Discussed with PA who is familiar with patient, states he is generally somatic and has been evaluated multiple times for respiratory concerns with no findings. Patient presently with stable vitals and 100% SPO2. Per RN not in respiratory distress. No intervention indicated at this time.      Vital Signs Last 24 Hrs  T(C): 35.7 (16 Jun 2023 08:24), Max: 36.6 (15 Sudhakar 2023 19:24)  T(F): 96.3 (16 Jun 2023 08:24), Max: 97.9 (15 Sudhakar 2023 19:24)  HR: 94 (16 Jun 2023 17:49) (94 - 94)  BP: 123/74 (16 Jun 2023 17:49) (123/74 - 123/74)  BP(mean): --  RR: 18 (16 Jun 2023 17:49) (18 - 18)  SpO2: 100% (16 Jun 2023 17:49) (100% - 100%)

## 2023-06-16 NOTE — BH INPATIENT PSYCHIATRY PROGRESS NOTE - NSBHASSESSSUMMFT_PSY_ALL_CORE
33 year old single male, domiciled at [facility]. Patient has past psychiatric history of moderate ID, ASD, impulse control disorder, factitious disorder, HAVEN, mood disorders, PTSD and ADHD.  Patient has extensive history of inpatient hospitalization and ED visits. Most recently patient was discharged from [facility] on 6/6/23.  Patient is re-hospitalized with a primary problem self inflicting laceration to abdomen, CAH to hurt self and others.  Patient admitted to [facility] on a 9.27 legal status.   Patient requires inpatient hospitalization due to symptoms of mental illness so severe that they significantly interfere with activities of daily living, and presents a potential danger to self as a result of acute decompensation with s/p  SA,  he is requiring 24-hour care at this time as a result, for psychiatric stabilization and safety.     Plan:  >Legal: 9.27  >Obs: Routine, no current SI. no need for CO, patient not expected to pose risk to self or others in controlled inpatient setting  >Psychiatric Meds: Increase Depakote 100mg BID, increase Guanfacine 4mg, Risperdal 4mg BID, Remeron 30mg qhs. Observe for tolerability and efficacy. Patient had been poorly adherent prior to admission.   PRN medications:  Ativan 2mg oral Q6HR PRN for agitation and anxiety.  Haldol 5mg oral Q6HR PRN for agitation.   Benadryl 50mg oral Q6HR PRN for agitation.   >Labs:  labs reviewed, no acute findings. U-tox negative, VPA level 61.40. Xray to left shoulder and clavicle resulted unremarkable.  Hold antipsychotics if QTc >500  >Medical: S/P abdomen lacerations.  Medicine and wound care consultations needed.  Patient with consistently stable VS.  During the course of treatment, will collaborate with medical team to manage medical issues.  >Diet: Regular  >Social: milieu/structured therapy  >Treatment Interventions: Groups and Individual Therapy/CBT, Motivational counseling for substance abuse related issues.   >Dispo: Collateral and dispo planning pending further symptom and medication optimization       33 year old single male, domiciled at [facility]. Patient has past psychiatric history of moderate ID, ASD, impulse control disorder, factitious disorder, HAVEN, mood disorders, PTSD and ADHD.  Patient has extensive history of inpatient hospitalization and ED visits. Most recently patient was discharged from [facility] on 6/6/23.  Patient is re-hospitalized with a primary problem self inflicting laceration to abdomen, CAH to hurt self and others.  Patient admitted to [facility] on a 9.27 legal status.   Patient requires inpatient hospitalization due to symptoms of mental illness so severe that they significantly interfere with activities of daily living, and presents a potential danger to self as a result of acute decompensation with s/p  SA,  he is requiring 24-hour care at this time as a result, for psychiatric stabilization and safety.     Plan:  >Legal: 9.27  >Obs: Routine, no current SI. no need for CO, patient not expected to pose risk to self or others in controlled inpatient setting  >Psychiatric Meds: Increase Depakote 1000mg qhs and 500mg daily, increase Intuniv ER 4mg, Risperdal 4mg BID, Remeron 30mg qhs. Observe for tolerability and efficacy. Patient had been poorly adherent prior to admission.   PRN medications:  Ativan 2mg oral Q6HR PRN for agitation and anxiety.  Haldol 5mg oral Q6HR PRN for agitation.   Benadryl 50mg oral Q6HR PRN for agitation.   >Labs:  labs reviewed, no acute findings. U-tox negative, VPA level 61.40. Xray to left shoulder and clavicle resulted unremarkable.  Hold antipsychotics if QTc >500  >Medical: S/P abdomen lacerations.  Medicine and wound care consultations needed.  Patient with consistently stable VS.  During the course of treatment, will collaborate with medical team to manage medical issues.  >Diet: Regular  >Social: milieu/structured therapy  >Treatment Interventions: Groups and Individual Therapy/CBT, Motivational counseling for substance abuse related issues.   >Dispo: Collateral and dispo planning pending further symptom and medication optimization

## 2023-06-16 NOTE — BH INPATIENT PSYCHIATRY PROGRESS NOTE - NSBHMETABOLIC_PSY_ALL_CORE_FT
BMI: BMI (kg/m2): 41 (06-13-23 @ 13:20)  HbA1c: A1C with Estimated Average Glucose Result: 6.5 % (05-31-23 @ 09:26)    Glucose: POCT Blood Glucose.: 209 mg/dL (06-15-23 @ 20:17)    BP: 109/71 (06-13-23 @ 11:15) (109/71 - 109/71)  Lipid Panel: Date/Time: 05-31-23 @ 09:26  Cholesterol, Serum: 128  Direct LDL: --  HDL Cholesterol, Serum: 31  Total Cholesterol/HDL Ration Measurement: --  Triglycerides, Serum: 157   BMI: BMI (kg/m2): 41 (06-13-23 @ 13:20)  HbA1c: A1C with Estimated Average Glucose Result: 6.5 % (05-31-23 @ 09:26)    Glucose: POCT Blood Glucose.: 117 mg/dL (06-16-23 @ 08:10)    BP: --  Lipid Panel: Date/Time: 05-31-23 @ 09:26  Cholesterol, Serum: 128  Direct LDL: --  HDL Cholesterol, Serum: 31  Total Cholesterol/HDL Ration Measurement: --  Triglycerides, Serum: 157   BMI: BMI (kg/m2): 41.3 (06-24-23 @ 00:30)  HbA1c: A1C with Estimated Average Glucose Result: 6.7 % (06-24-23 @ 06:10)    Glucose: POCT Blood Glucose.: 106 mg/dL (06-24-23 @ 09:14)    BP: 98/70 (06-22-23 @ 19:29) (98/70 - 111/88)  Lipid Panel: Date/Time: 06-24-23 @ 06:10  Cholesterol, Serum: 118  Direct LDL: --  HDL Cholesterol, Serum: 25  Total Cholesterol/HDL Ration Measurement: --  Triglycerides, Serum: 155

## 2023-06-16 NOTE — BH INPATIENT PSYCHIATRY PROGRESS NOTE - NSBHCHARTREVIEWVS_PSY_A_CORE FT
Vital Signs Last 24 Hrs  T(C): 36.6 (06-15-23 @ 19:24), Max: 36.6 (06-15-23 @ 19:24)  T(F): 97.9 (06-15-23 @ 19:24), Max: 97.9 (06-15-23 @ 19:24)  HR: --  BP: --  BP(mean): --  RR: --  SpO2: --    Orthostatic VS  06-15-23 @ 19:24  Lying BP: --/-- HR: --  Sitting BP: 124/77 HR: 77  Standing BP: 131/69 HR: 91  Site: --  Mode: --  Orthostatic VS  06-15-23 @ 08:14  Lying BP: --/-- HR: --  Sitting BP: 105/78 HR: 71  Standing BP: 110/60 HR: 90  Site: --  Mode: electronic  Orthostatic VS  06-14-23 @ 19:19  Lying BP: --/-- HR: --  Sitting BP: 135/94 HR: 86  Standing BP: 130/92 HR: 108  Site: --  Mode: --  Orthostatic VS  06-14-23 @ 08:27  Lying BP: --/-- HR: --  Sitting BP: 90/62 HR: 73  Standing BP: 101/74 HR: 71  Site: --  Mode: --   Vital Signs Last 24 Hrs  T(C): 35.7 (06-16-23 @ 08:24), Max: 36.6 (06-15-23 @ 19:24)  T(F): 96.3 (06-16-23 @ 08:24), Max: 97.9 (06-15-23 @ 19:24)  HR: --  BP: --  BP(mean): --  RR: --  SpO2: --    Orthostatic VS  06-16-23 @ 08:24  Lying BP: --/-- HR: --  Sitting BP: 106/68 HR: 68  Standing BP: --/-- HR: --  Site: --  Mode: --  Orthostatic VS  06-15-23 @ 19:24  Lying BP: --/-- HR: --  Sitting BP: 124/77 HR: 77  Standing BP: 131/69 HR: 91  Site: --  Mode: --  Orthostatic VS  06-15-23 @ 08:14  Lying BP: --/-- HR: --  Sitting BP: 105/78 HR: 71  Standing BP: 110/60 HR: 90  Site: --  Mode: electronic  Orthostatic VS  06-14-23 @ 19:19  Lying BP: --/-- HR: --  Sitting BP: 135/94 HR: 86  Standing BP: 130/92 HR: 108  Site: --  Mode: --   Vital Signs Last 24 Hrs  T(C): 36.4 (06-24-23 @ 06:20), Max: 36.9 (06-23-23 @ 12:00)  T(F): 97.5 (06-24-23 @ 06:20), Max: 98.4 (06-23-23 @ 12:00)  HR: 78 (06-24-23 @ 06:20) (62 - 88)  BP: 107/68 (06-24-23 @ 06:20) (96/72 - 142/90)  BP(mean): --  RR: 19 (06-24-23 @ 06:20) (17 - 20)  SpO2: 97% (06-24-23 @ 06:20) (97% - 100%)

## 2023-06-16 NOTE — BH INPATIENT PSYCHIATRY PROGRESS NOTE - CURRENT MEDICATION
MEDICATIONS  (STANDING):  atorvastatin 10 milliGRAM(s) Oral at bedtime  bacitracin   Ointment 1 Application(s) Topical two times a day  dextrose 5%. 1000 milliLiter(s) (100 mL/Hr) IV Continuous <Continuous>  dextrose 5%. 1000 milliLiter(s) (50 mL/Hr) IV Continuous <Continuous>  dextrose 50% Injectable 25 Gram(s) IV Push once  dextrose 50% Injectable 12.5 Gram(s) IV Push once  divalproex DR 1000 milliGRAM(s) Oral at bedtime  divalproex  milliGRAM(s) Oral daily  ferrous    sulfate 325 milliGRAM(s) Oral at bedtime  gabapentin 400 milliGRAM(s) Oral two times a day  glucagon  Injectable 1 milliGRAM(s) IntraMuscular once  guanFACINE. 3 milliGRAM(s) Oral at bedtime  ibuprofen  Tablet. 600 milliGRAM(s) Oral three times a day  insulin lispro (ADMELOG) corrective regimen sliding scale   SubCutaneous three times a day before meals  levothyroxine 50 MICROGram(s) Oral daily  metFORMIN 1000 milliGRAM(s) Oral daily  mirtazapine 15 milliGRAM(s) Oral at bedtime  risperiDONE   Tablet 4 milliGRAM(s) Oral two times a day  tamsulosin 0.4 milliGRAM(s) Oral at bedtime    MEDICATIONS  (PRN):  chlorproMAZINE    Injectable 50 milliGRAM(s) IntraMuscular once PRN severe aggression  chlorproMAZINE    Tablet 50 milliGRAM(s) Oral every 6 hours PRN aggression  dextrose Oral Gel 15 Gram(s) Oral once PRN Blood Glucose LESS THAN 70 milliGRAM(s)/deciliter  LORazepam     Tablet 2 milliGRAM(s) Oral every 4 hours PRN aggression  LORazepam   Injectable 3 milliGRAM(s) IntraMuscular once PRN severe agitation   MEDICATIONS  (STANDING):  atorvastatin 10 milliGRAM(s) Oral at bedtime  bacitracin   Ointment 1 Application(s) Topical two times a day  dextrose 5%. 1000 milliLiter(s) (50 mL/Hr) IV Continuous <Continuous>  dextrose 5%. 1000 milliLiter(s) (100 mL/Hr) IV Continuous <Continuous>  dextrose 50% Injectable 25 Gram(s) IV Push once  dextrose 50% Injectable 12.5 Gram(s) IV Push once  divalproex DR 1000 milliGRAM(s) Oral at bedtime  divalproex  milliGRAM(s) Oral daily  ferrous    sulfate 325 milliGRAM(s) Oral at bedtime  gabapentin 400 milliGRAM(s) Oral two times a day  glucagon  Injectable 1 milliGRAM(s) IntraMuscular once  ibuprofen  Tablet. 600 milliGRAM(s) Oral three times a day  insulin lispro (ADMELOG) corrective regimen sliding scale   SubCutaneous three times a day before meals  levothyroxine 50 MICROGram(s) Oral daily  metFORMIN 1000 milliGRAM(s) Oral daily  mirtazapine 30 milliGRAM(s) Oral at bedtime  risperiDONE   Tablet 4 milliGRAM(s) Oral two times a day  tamsulosin 0.4 milliGRAM(s) Oral at bedtime    MEDICATIONS  (PRN):  chlorproMAZINE    Injectable 50 milliGRAM(s) IntraMuscular once PRN severe aggression  chlorproMAZINE    Tablet 50 milliGRAM(s) Oral every 6 hours PRN aggression  dextrose Oral Gel 15 Gram(s) Oral once PRN Blood Glucose LESS THAN 70 milliGRAM(s)/deciliter  LORazepam     Tablet 2 milliGRAM(s) Oral every 4 hours PRN aggression  LORazepam   Injectable 3 milliGRAM(s) IntraMuscular once PRN severe agitation   MEDICATIONS  (STANDING):  atorvastatin 10 milliGRAM(s) Oral at bedtime  bacitracin   Ointment 1 Application(s) Topical two times a day  dextrose 5%. 1000 milliLiter(s) (100 mL/Hr) IV Continuous <Continuous>  dextrose 5%. 1000 milliLiter(s) (50 mL/Hr) IV Continuous <Continuous>  dextrose 50% Injectable 25 Gram(s) IV Push once  dextrose 50% Injectable 12.5 Gram(s) IV Push once  divalproex DR 1000 milliGRAM(s) Oral at bedtime  divalproex  milliGRAM(s) Oral daily  ferrous    sulfate 325 milliGRAM(s) Oral at bedtime  gabapentin 400 milliGRAM(s) Oral two times a day  glucagon  Injectable 1 milliGRAM(s) IntraMuscular once  guanFACINE ER 4 milliGRAM(s) Oral at bedtime  ibuprofen  Tablet. 600 milliGRAM(s) Oral three times a day  insulin lispro (ADMELOG) corrective regimen sliding scale   SubCutaneous three times a day before meals  levothyroxine 50 MICROGram(s) Oral daily  metFORMIN 1000 milliGRAM(s) Oral daily  mirtazapine 30 milliGRAM(s) Oral at bedtime  risperiDONE   Tablet 4 milliGRAM(s) Oral two times a day  tamsulosin 0.4 milliGRAM(s) Oral at bedtime    MEDICATIONS  (PRN):  chlorproMAZINE    Injectable 50 milliGRAM(s) IntraMuscular once PRN severe aggression  chlorproMAZINE    Tablet 50 milliGRAM(s) Oral every 6 hours PRN aggression  dextrose Oral Gel 15 Gram(s) Oral once PRN Blood Glucose LESS THAN 70 milliGRAM(s)/deciliter  LORazepam     Tablet 2 milliGRAM(s) Oral every 4 hours PRN aggression  LORazepam   Injectable 3 milliGRAM(s) IntraMuscular once PRN severe agitation   MEDICATIONS  (STANDING):    MEDICATIONS  (PRN):
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
## 2023-06-17 LAB
GLUCOSE BLDC GLUCOMTR-MCNC: 118 MG/DL — HIGH (ref 70–99)
GLUCOSE BLDC GLUCOMTR-MCNC: 125 MG/DL — HIGH (ref 70–99)
GLUCOSE BLDC GLUCOMTR-MCNC: 140 MG/DL — HIGH (ref 70–99)
GLUCOSE BLDC GLUCOMTR-MCNC: 153 MG/DL — HIGH (ref 70–99)

## 2023-06-17 PROCEDURE — 99232 SBSQ HOSP IP/OBS MODERATE 35: CPT

## 2023-06-17 RX ADMIN — Medication 2: at 11:52

## 2023-06-17 RX ADMIN — TAMSULOSIN HYDROCHLORIDE 0.4 MILLIGRAM(S): 0.4 CAPSULE ORAL at 20:29

## 2023-06-17 RX ADMIN — Medication 1 APPLICATION(S): at 20:28

## 2023-06-17 RX ADMIN — GABAPENTIN 400 MILLIGRAM(S): 400 CAPSULE ORAL at 20:29

## 2023-06-17 RX ADMIN — RISPERIDONE 4 MILLIGRAM(S): 4 TABLET ORAL at 09:17

## 2023-06-17 RX ADMIN — Medication 50 MICROGRAM(S): at 06:37

## 2023-06-17 RX ADMIN — GUANFACINE 4 MILLIGRAM(S): 3 TABLET, EXTENDED RELEASE ORAL at 20:29

## 2023-06-17 RX ADMIN — Medication 50 MILLIGRAM(S): at 20:29

## 2023-06-17 RX ADMIN — GABAPENTIN 400 MILLIGRAM(S): 400 CAPSULE ORAL at 09:17

## 2023-06-17 RX ADMIN — Medication 2 MILLIGRAM(S): at 20:28

## 2023-06-17 RX ADMIN — Medication 325 MILLIGRAM(S): at 20:29

## 2023-06-17 RX ADMIN — Medication 1 APPLICATION(S): at 09:19

## 2023-06-17 RX ADMIN — DIVALPROEX SODIUM 1000 MILLIGRAM(S): 500 TABLET, DELAYED RELEASE ORAL at 20:29

## 2023-06-17 RX ADMIN — RISPERIDONE 4 MILLIGRAM(S): 4 TABLET ORAL at 20:28

## 2023-06-17 RX ADMIN — ATORVASTATIN CALCIUM 10 MILLIGRAM(S): 80 TABLET, FILM COATED ORAL at 20:29

## 2023-06-17 RX ADMIN — MIRTAZAPINE 30 MILLIGRAM(S): 45 TABLET, ORALLY DISINTEGRATING ORAL at 20:29

## 2023-06-17 RX ADMIN — METFORMIN HYDROCHLORIDE 1000 MILLIGRAM(S): 850 TABLET ORAL at 09:17

## 2023-06-17 RX ADMIN — DIVALPROEX SODIUM 500 MILLIGRAM(S): 500 TABLET, DELAYED RELEASE ORAL at 09:17

## 2023-06-17 NOTE — BH INPATIENT PSYCHIATRY PROGRESS NOTE - NSBHASSESSSUMMFT_PSY_ALL_CORE
33 year old single male, domiciled at [facility]. Patient has past psychiatric history of moderate ID, ASD, impulse control disorder, factitious disorder, HAVEN, mood disorders, PTSD and ADHD.  Patient has extensive history of inpatient hospitalization and ED visits. Most recently patient was discharged from [facility] on 6/6/23.  Patient is re-hospitalized with a primary problem self inflicting laceration to abdomen, CAH to hurt self and others.  Patient admitted to [facility] on a 9.27 legal status.   Patient requires inpatient hospitalization due to symptoms of mental illness so severe that they significantly interfere with activities of daily living, and presents a potential danger to self as a result of acute decompensation with s/p  SA,  he is requiring 24-hour care at this time as a result, for psychiatric stabilization and safety.     Plan:  >Legal: 9.27  >Obs: Routine, no current SI. no need for CO, patient not expected to pose risk to self or others in controlled inpatient setting  >Psychiatric Meds: Increase Depakote 1000mg qhs and 500mg daily, increase Intuniv ER 4mg, Risperdal 4mg BID, Remeron 30mg qhs. Observe for tolerability and efficacy. Patient had been poorly adherent prior to admission.   PRN medications:  Ativan 2mg oral Q6HR PRN for agitation and anxiety.  Haldol 5mg oral Q6HR PRN for agitation.   Benadryl 50mg oral Q6HR PRN for agitation.   >Labs:  labs reviewed, no acute findings. U-tox negative, VPA level 61.40. Xray to left shoulder and clavicle resulted unremarkable.  Hold antipsychotics if QTc >500  >Medical: S/P abdomen lacerations.  Medicine and wound care consultations needed.  Patient with consistently stable VS.  During the course of treatment, will collaborate with medical team to manage medical issues.  >Diet: Regular  >Social: milieu/structured therapy  >Treatment Interventions: Groups and Individual Therapy/CBT, Motivational counseling for substance abuse related issues.   >Dispo: Collateral and dispo planning pending further symptom and medication optimization

## 2023-06-17 NOTE — PHYSICAL THERAPY INITIAL EVALUATION ADULT - PERTINENT HX OF CURRENT PROBLEM, REHAB EVAL
Patient is 33 year old single male, domiciled at [facility]. Patient has past psychiatric history of moderate ID, ASD, impulse control disorder, factitious disorder, HAVEN, mood disorders, PTSD and ADHD.  Patient has extensive history of inpatient hospitalization and ED visits. Most recently patient was discharged from [facility] on 6/6/23.  Patient is re-hospitalized with a primary problem self inflicting laceration to abdomen, CAH to hurt self and others.  Patient admitted to [facility] on a 9.27 legal status.  Pt reported pain to shoulder and decreased ROM (unable to lift arm over head).  XRAY resulted unremarkable. No fracture or dislocation. The acromioclavicular and glenohumeral joints are within normal limits.

## 2023-06-17 NOTE — BH INPATIENT PSYCHIATRY PROGRESS NOTE - NSBHCHARTREVIEWVS_PSY_A_CORE FT
Vital Signs Last 24 Hrs  T(C): 36.1 (06-16-23 @ 19:25), Max: 36.1 (06-16-23 @ 19:25)  T(F): 97 (06-16-23 @ 19:25), Max: 97 (06-16-23 @ 19:25)  HR: 94 (06-16-23 @ 17:49) (94 - 94)  BP: 123/74 (06-16-23 @ 17:49) (123/74 - 123/74)  BP(mean): --  RR: 18 (06-16-23 @ 20:40) (18 - 18)  SpO2: 100% (06-16-23 @ 20:40) (100% - 100%)    Orthostatic VS  06-16-23 @ 19:25  Lying BP: --/-- HR: --  Sitting BP: 122/75 HR: 83  Standing BP: 110/66 HR: 103  Site: --  Mode: --  Orthostatic VS  06-16-23 @ 08:24  Lying BP: --/-- HR: --  Sitting BP: 106/68 HR: 68  Standing BP: --/-- HR: --  Site: --  Mode: --  Orthostatic VS  06-15-23 @ 19:24  Lying BP: --/-- HR: --  Sitting BP: 124/77 HR: 77  Standing BP: 131/69 HR: 91  Site: --  Mode: --

## 2023-06-17 NOTE — BH INPATIENT PSYCHIATRY PROGRESS NOTE - NSBHMETABOLIC_PSY_ALL_CORE_FT
BMI: BMI (kg/m2): 41 (06-13-23 @ 13:20)  HbA1c: A1C with Estimated Average Glucose Result: 6.5 % (05-31-23 @ 09:26)    Glucose: POCT Blood Glucose.: 140 mg/dL (06-17-23 @ 07:18)    BP: 123/74 (06-16-23 @ 17:49) (123/74 - 123/74)  Lipid Panel: Date/Time: 05-31-23 @ 09:26  Cholesterol, Serum: 128  Direct LDL: --  HDL Cholesterol, Serum: 31  Total Cholesterol/HDL Ration Measurement: --  Triglycerides, Serum: 157

## 2023-06-17 NOTE — PHYSICAL THERAPY INITIAL EVALUATION ADULT - ACTIVE RANGE OF MOTION EXAMINATION, REHAB EVAL
left UE: patient is unable to do/Right LE Active ROM was WFL (within functional limits)/bilateral  lower extremity Active ROM was WFL (within functional limits)

## 2023-06-17 NOTE — BH INPATIENT PSYCHIATRY PROGRESS NOTE - CURRENT MEDICATION
MEDICATIONS  (STANDING):  atorvastatin 10 milliGRAM(s) Oral at bedtime  bacitracin   Ointment 1 Application(s) Topical two times a day  dextrose 5%. 1000 milliLiter(s) (100 mL/Hr) IV Continuous <Continuous>  dextrose 5%. 1000 milliLiter(s) (50 mL/Hr) IV Continuous <Continuous>  dextrose 50% Injectable 25 Gram(s) IV Push once  dextrose 50% Injectable 12.5 Gram(s) IV Push once  divalproex DR 1000 milliGRAM(s) Oral at bedtime  divalproex  milliGRAM(s) Oral daily  ferrous    sulfate 325 milliGRAM(s) Oral at bedtime  gabapentin 400 milliGRAM(s) Oral two times a day  glucagon  Injectable 1 milliGRAM(s) IntraMuscular once  guanFACINE ER 4 milliGRAM(s) Oral at bedtime  insulin lispro (ADMELOG) corrective regimen sliding scale   SubCutaneous three times a day before meals  levothyroxine 50 MICROGram(s) Oral daily  metFORMIN 1000 milliGRAM(s) Oral daily  mirtazapine 30 milliGRAM(s) Oral at bedtime  risperiDONE   Tablet 4 milliGRAM(s) Oral two times a day  tamsulosin 0.4 milliGRAM(s) Oral at bedtime    MEDICATIONS  (PRN):  albuterol    90 MICROgram(s) HFA Inhaler 2 Puff(s) Inhalation every 6 hours PRN wheezing  chlorproMAZINE    Injectable 50 milliGRAM(s) IntraMuscular once PRN severe aggression  chlorproMAZINE    Tablet 50 milliGRAM(s) Oral every 6 hours PRN aggression  dextrose Oral Gel 15 Gram(s) Oral once PRN Blood Glucose LESS THAN 70 milliGRAM(s)/deciliter  diphenhydrAMINE 50 milliGRAM(s) Oral once PRN wheezing  LORazepam     Tablet 2 milliGRAM(s) Oral every 4 hours PRN aggression  LORazepam   Injectable 3 milliGRAM(s) IntraMuscular once PRN severe agitation

## 2023-06-17 NOTE — BH INPATIENT PSYCHIATRY PROGRESS NOTE - NSBHFUPINTERVALHXFT_PSY_A_CORE
Patient is followed up for MDD and depressed mood, s/p suicide attempt. Patient is discussed with nursing team, no interval events.  Patient remains compliant with all standing medications, no SE noted. Patient is eating  and sleeping fairly well. Patient has been in fair behavioral control no prns for aggression.  Per nursing somewhat somatic last night given Ativan 2mg prn    Patient is observed in his room, he is calm, and cooperative.    Patient reports feeling “ok” denies SI/HI, no plan or intent.   No overt psychotic trend. No acute medical issues, wound care daily for lacerations to abdomen. No reports of pain, no drainage, or redness.  Xray to arm unremarkable, PT consent ordered.  POCT:140   VSS. Continue to monitor and provide therapeutic support.

## 2023-06-17 NOTE — BH INPATIENT PSYCHIATRY PROGRESS NOTE - PRN MEDS
MEDICATIONS  (PRN):  albuterol    90 MICROgram(s) HFA Inhaler 2 Puff(s) Inhalation every 6 hours PRN wheezing  chlorproMAZINE    Injectable 50 milliGRAM(s) IntraMuscular once PRN severe aggression  chlorproMAZINE    Tablet 50 milliGRAM(s) Oral every 6 hours PRN aggression  dextrose Oral Gel 15 Gram(s) Oral once PRN Blood Glucose LESS THAN 70 milliGRAM(s)/deciliter  diphenhydrAMINE 50 milliGRAM(s) Oral once PRN wheezing  LORazepam     Tablet 2 milliGRAM(s) Oral every 4 hours PRN aggression  LORazepam   Injectable 3 milliGRAM(s) IntraMuscular once PRN severe agitation

## 2023-06-18 LAB
GLUCOSE BLDC GLUCOMTR-MCNC: 125 MG/DL — HIGH (ref 70–99)
GLUCOSE BLDC GLUCOMTR-MCNC: 132 MG/DL — HIGH (ref 70–99)
GLUCOSE BLDC GLUCOMTR-MCNC: 138 MG/DL — HIGH (ref 70–99)
GLUCOSE BLDC GLUCOMTR-MCNC: 144 MG/DL — HIGH (ref 70–99)
SARS-COV-2 RNA SPEC QL NAA+PROBE: SIGNIFICANT CHANGE UP

## 2023-06-18 PROCEDURE — 99232 SBSQ HOSP IP/OBS MODERATE 35: CPT

## 2023-06-18 RX ORDER — CHLORPROMAZINE HCL 10 MG
50 TABLET ORAL ONCE
Refills: 0 | Status: DISCONTINUED | OUTPATIENT
Start: 2023-06-18 | End: 2023-06-19

## 2023-06-18 RX ORDER — DIPHENHYDRAMINE HCL 50 MG
50 CAPSULE ORAL ONCE
Refills: 0 | Status: DISCONTINUED | OUTPATIENT
Start: 2023-06-18 | End: 2023-06-18

## 2023-06-18 RX ADMIN — MIRTAZAPINE 30 MILLIGRAM(S): 45 TABLET, ORALLY DISINTEGRATING ORAL at 19:56

## 2023-06-18 RX ADMIN — TAMSULOSIN HYDROCHLORIDE 0.4 MILLIGRAM(S): 0.4 CAPSULE ORAL at 19:54

## 2023-06-18 RX ADMIN — RISPERIDONE 4 MILLIGRAM(S): 4 TABLET ORAL at 19:55

## 2023-06-18 RX ADMIN — DIVALPROEX SODIUM 1000 MILLIGRAM(S): 500 TABLET, DELAYED RELEASE ORAL at 19:54

## 2023-06-18 RX ADMIN — GUANFACINE 4 MILLIGRAM(S): 3 TABLET, EXTENDED RELEASE ORAL at 19:55

## 2023-06-18 RX ADMIN — GABAPENTIN 400 MILLIGRAM(S): 400 CAPSULE ORAL at 19:55

## 2023-06-18 RX ADMIN — Medication 2 MILLIGRAM(S): at 19:52

## 2023-06-18 RX ADMIN — Medication 1 APPLICATION(S): at 19:56

## 2023-06-18 RX ADMIN — Medication 325 MILLIGRAM(S): at 19:55

## 2023-06-18 RX ADMIN — ATORVASTATIN CALCIUM 10 MILLIGRAM(S): 80 TABLET, FILM COATED ORAL at 19:55

## 2023-06-18 NOTE — BH CHART NOTE - NSEVENTNOTEFT_PSY_ALL_CORE
Psych emergency called at 8:21pm. Staff was attempting to check patient's pockets and room due to safety concerns after patient reported self-harm. CAROL called for activation of IM medication due to patient agitation. Psych emergency called. Thorazine 50mg and Ativan 3mg IM activated for threat to self and others, but patient ultimately accepted his HS PO medications.. Patient seen after medications administered, noted to be resting comfortably in NAD. Advised RN staff to notify CAROL if patient continues to be agitated.

## 2023-06-18 NOTE — BH INPATIENT PSYCHIATRY PROGRESS NOTE - NSBHFUPINTERVALHXFT_PSY_A_CORE
Patient is followed up for MDD and depressed mood, s/p suicide attempt. Patient is discussed with nursing team, no interval events.  Patient remains compliant with all standing medications, no SE noted. Patient is eating  and sleeping fairly well. Patient offered wedge, refused.  Patient has been in fair behavioral control no prns for aggression.  Per nursing somewhat somatic and more irritable last night given Ativan 2mg prn    Patient is observed in his room, he is calm, and cooperative.   Offers no complaints,  denies SI/HI, no plan or intent.   No overt psychotic trend. No acute medical issues, wound care daily for lacerations to abdomen. No reports of pain, no drainage, or redness.  Xray to arm unremarkable, PT consent ordered.  POCT:125   VSS. Continue to monitor and provide therapeutic support.

## 2023-06-18 NOTE — BH INPATIENT PSYCHIATRY PROGRESS NOTE - NSBHCHARTREVIEWVS_PSY_A_CORE FT
Vital Signs Last 24 Hrs  T(C): 35.8 (06-18-23 @ 08:20), Max: 36.2 (06-17-23 @ 19:26)  T(F): 96.4 (06-18-23 @ 08:20), Max: 97.2 (06-17-23 @ 19:26)  HR: --  BP: --  BP(mean): --  RR: 17 (06-17-23 @ 20:02) (17 - 17)  SpO2: --    Orthostatic VS  06-18-23 @ 08:20  Lying BP: --/-- HR: --  Sitting BP: 119/62 HR: 61  Standing BP: --/-- HR: --  Site: --  Mode: --  Orthostatic VS  06-17-23 @ 19:26  Lying BP: --/-- HR: --  Sitting BP: 122/84 HR: 82  Standing BP: 106/81 HR: 97  Site: --  Mode: --  Orthostatic VS  06-16-23 @ 19:25  Lying BP: --/-- HR: --  Sitting BP: 122/75 HR: 83  Standing BP: 110/66 HR: 103  Site: --  Mode: --

## 2023-06-18 NOTE — BH INPATIENT PSYCHIATRY PROGRESS NOTE - NSBHMETABOLIC_PSY_ALL_CORE_FT
BMI: BMI (kg/m2): 41 (06-13-23 @ 13:20)  HbA1c: A1C with Estimated Average Glucose Result: 6.5 % (05-31-23 @ 09:26)    Glucose: POCT Blood Glucose.: 125 mg/dL (06-18-23 @ 07:33)    BP: 123/74 (06-16-23 @ 17:49) (123/74 - 123/74)  Lipid Panel: Date/Time: 05-31-23 @ 09:26  Cholesterol, Serum: 128  Direct LDL: --  HDL Cholesterol, Serum: 31  Total Cholesterol/HDL Ration Measurement: --  Triglycerides, Serum: 157

## 2023-06-18 NOTE — BH INPATIENT PSYCHIATRY PROGRESS NOTE - NSBHASSESSSUMMFT_PSY_ALL_CORE
33 year old single male, domiciled at [facility]. Patient has past psychiatric history of moderate ID, ASD, impulse control disorder, factitious disorder, HAVEN, mood disorders, PTSD and ADHD.  Patient has extensive history of inpatient hospitalization and ED visits. Most recently patient was discharged from [facility] on 6/6/23.  Patient is re-hospitalized with a primary problem self inflicting laceration to abdomen, CAH to hurt self and others.  Patient admitted to [facility] on a 9.27 legal status.   Patient requires inpatient hospitalization due to symptoms of mental illness so severe that they significantly interfere with activities of daily living, and presents a potential danger to self as a result of acute decompensation with s/p  SA,  he is requiring 24-hour care at this time as a result, for psychiatric stabilization and safety.     Plan:  >Legal: 9.27  >Obs: Routine, no current SI. no need for CO, patient not expected to pose risk to self or others in controlled inpatient setting  >Psychiatric Meds: Increase Depakote 1000mg qhs and 500mg daily, increase Intuniv ER 4mg, Risperdal 4mg BID, Remeron 30mg qhs. Observe for tolerability and efficacy. Patient had been poorly adherent prior to admission.   PRN medications:  Ativan 2mg oral Q6HR PRN for agitation and anxiety.  Haldol 5mg oral Q6HR PRN for agitation.   Benadryl 50mg oral Q6HR PRN for agitation.   >Labs:  labs reviewed, no acute findings. U-tox negative, VPA level 61.40. Xray to left shoulder and clavicle resulted unremarkable.  Hold antipsychotics if QTc >500  >Medical: S/P abdomen lacerations.  Medicine and wound care consultations needed.  Patient with consistently stable VS.  During the course of treatment, will collaborate with medical team to manage medical issues.  >Diet: Regular  >Social: milieu/structured therapy  >Treatment Interventions: Groups and Individual Therapy/CBT, Motivational counseling for substance abuse related issues.   >Dispo: Collateral and dispo planning pending further symptom and medication optimization
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
## 2023-06-19 LAB
GLUCOSE BLDC GLUCOMTR-MCNC: 105 MG/DL — HIGH (ref 70–99)
GLUCOSE BLDC GLUCOMTR-MCNC: 137 MG/DL — HIGH (ref 70–99)
GLUCOSE BLDC GLUCOMTR-MCNC: 210 MG/DL — HIGH (ref 70–99)

## 2023-06-19 PROCEDURE — 99232 SBSQ HOSP IP/OBS MODERATE 35: CPT

## 2023-06-19 RX ORDER — DIVALPROEX SODIUM 500 MG/1
1500 TABLET, DELAYED RELEASE ORAL AT BEDTIME
Refills: 0 | Status: DISCONTINUED | OUTPATIENT
Start: 2023-06-20 | End: 2023-06-23

## 2023-06-19 RX ORDER — DIVALPROEX SODIUM 500 MG/1
1000 TABLET, DELAYED RELEASE ORAL AT BEDTIME
Refills: 0 | Status: COMPLETED | OUTPATIENT
Start: 2023-06-19 | End: 2023-06-19

## 2023-06-19 RX ORDER — CLONAZEPAM 1 MG
0.5 TABLET ORAL THREE TIMES A DAY
Refills: 0 | Status: DISCONTINUED | OUTPATIENT
Start: 2023-06-19 | End: 2023-06-19

## 2023-06-19 RX ORDER — CLONAZEPAM 1 MG
1 TABLET ORAL THREE TIMES A DAY
Refills: 0 | Status: DISCONTINUED | OUTPATIENT
Start: 2023-06-19 | End: 2023-06-21

## 2023-06-19 RX ADMIN — ATORVASTATIN CALCIUM 10 MILLIGRAM(S): 80 TABLET, FILM COATED ORAL at 21:27

## 2023-06-19 RX ADMIN — Medication 1 MILLIGRAM(S): at 13:29

## 2023-06-19 RX ADMIN — Medication 1 MILLIGRAM(S): at 21:25

## 2023-06-19 RX ADMIN — RISPERIDONE 4 MILLIGRAM(S): 4 TABLET ORAL at 09:03

## 2023-06-19 RX ADMIN — DIVALPROEX SODIUM 500 MILLIGRAM(S): 500 TABLET, DELAYED RELEASE ORAL at 09:03

## 2023-06-19 RX ADMIN — DIVALPROEX SODIUM 1000 MILLIGRAM(S): 500 TABLET, DELAYED RELEASE ORAL at 21:26

## 2023-06-19 RX ADMIN — GABAPENTIN 400 MILLIGRAM(S): 400 CAPSULE ORAL at 21:26

## 2023-06-19 RX ADMIN — GUANFACINE 4 MILLIGRAM(S): 3 TABLET, EXTENDED RELEASE ORAL at 21:27

## 2023-06-19 RX ADMIN — Medication 50 MICROGRAM(S): at 05:25

## 2023-06-19 RX ADMIN — Medication 50 MILLIGRAM(S): at 21:26

## 2023-06-19 RX ADMIN — Medication 325 MILLIGRAM(S): at 21:26

## 2023-06-19 RX ADMIN — METFORMIN HYDROCHLORIDE 1000 MILLIGRAM(S): 850 TABLET ORAL at 09:03

## 2023-06-19 RX ADMIN — Medication 1 APPLICATION(S): at 21:26

## 2023-06-19 RX ADMIN — Medication 50 MILLIGRAM(S): at 13:29

## 2023-06-19 RX ADMIN — TAMSULOSIN HYDROCHLORIDE 0.4 MILLIGRAM(S): 0.4 CAPSULE ORAL at 21:26

## 2023-06-19 RX ADMIN — RISPERIDONE 4 MILLIGRAM(S): 4 TABLET ORAL at 21:27

## 2023-06-19 RX ADMIN — GABAPENTIN 400 MILLIGRAM(S): 400 CAPSULE ORAL at 09:03

## 2023-06-19 RX ADMIN — MIRTAZAPINE 30 MILLIGRAM(S): 45 TABLET, ORALLY DISINTEGRATING ORAL at 21:26
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
## 2023-06-19 NOTE — BH INPATIENT PSYCHIATRY PROGRESS NOTE - NSBHMETABOLIC_PSY_ALL_CORE_FT
BMI: BMI (kg/m2): 41 (06-13-23 @ 13:20)  HbA1c: A1C with Estimated Average Glucose Result: 6.5 % (05-31-23 @ 09:26)    Glucose: POCT Blood Glucose.: 138 mg/dL (06-18-23 @ 20:06)    BP: 123/74 (06-16-23 @ 17:49) (123/74 - 123/74)  Lipid Panel: Date/Time: 05-31-23 @ 09:26  Cholesterol, Serum: 128  Direct LDL: --  HDL Cholesterol, Serum: 31  Total Cholesterol/HDL Ration Measurement: --  Triglycerides, Serum: 157   BMI: BMI (kg/m2): 41 (06-13-23 @ 13:20)  HbA1c: A1C with Estimated Average Glucose Result: 6.5 % (05-31-23 @ 09:26)    Glucose: POCT Blood Glucose.: 132 mg/dL (06-20-23 @ 16:18)    BP: 101/70 (06-20-23 @ 16:49) (101/70 - 101/70)  Lipid Panel: Date/Time: 05-31-23 @ 09:26  Cholesterol, Serum: 128  Direct LDL: --  HDL Cholesterol, Serum: 31  Total Cholesterol/HDL Ration Measurement: --  Triglycerides, Serum: 157

## 2023-06-19 NOTE — BH INPATIENT PSYCHIATRY PROGRESS NOTE - NSBHCHARTREVIEWVS_PSY_A_CORE FT
Vital Signs Last 24 Hrs  T(C): 36.8 (06-18-23 @ 20:21), Max: 36.8 (06-18-23 @ 20:21)  T(F): 98.2 (06-18-23 @ 20:21), Max: 98.2 (06-18-23 @ 20:21)  HR: --  BP: --  BP(mean): --  RR: 18 (06-18-23 @ 19:56) (18 - 18)  SpO2: --    Orthostatic VS  06-18-23 @ 20:21  Lying BP: --/-- HR: --  Sitting BP: 101/62 HR: 85  Standing BP: 100/60 HR: 99  Site: --  Mode: --  Orthostatic VS  06-18-23 @ 08:20  Lying BP: --/-- HR: --  Sitting BP: 119/62 HR: 61  Standing BP: --/-- HR: --  Site: --  Mode: --  Orthostatic VS  06-17-23 @ 19:26  Lying BP: --/-- HR: --  Sitting BP: 122/84 HR: 82  Standing BP: 106/81 HR: 97  Site: --  Mode: --   Vital Signs Last 24 Hrs  T(C): 36.8 (06-18-23 @ 20:21), Max: 36.8 (06-18-23 @ 20:21)  T(F): 98.2 (06-18-23 @ 20:21), Max: 98.2 (06-18-23 @ 20:21)  HR: --  BP: --  BP(mean): --  RR: 16 (06-19-23 @ 09:09) (16 - 18)  SpO2: --    Orthostatic VS  06-18-23 @ 20:21  Lying BP: --/-- HR: --  Sitting BP: 101/62 HR: 85  Standing BP: 100/60 HR: 99  Site: --  Mode: --  Orthostatic VS  06-18-23 @ 08:20  Lying BP: --/-- HR: --  Sitting BP: 119/62 HR: 61  Standing BP: --/-- HR: --  Site: --  Mode: --  Orthostatic VS  06-17-23 @ 19:26  Lying BP: --/-- HR: --  Sitting BP: 122/84 HR: 82  Standing BP: 106/81 HR: 97  Site: --  Mode: --   Vital Signs Last 24 Hrs  T(C): 36.8 (06-19-23 @ 19:15), Max: 36.8 (06-19-23 @ 19:15)  T(F): 98.2 (06-19-23 @ 19:15), Max: 98.2 (06-19-23 @ 19:15)  HR: 98 (06-20-23 @ 16:49) (98 - 98)  BP: 101/70 (06-20-23 @ 16:49) (101/70 - 101/70)  BP(mean): --  RR: 16 (06-20-23 @ 16:55) (16 - 16)  SpO2: 96% (06-20-23 @ 16:55) (96% - 96%)    Orthostatic VS  06-20-23 @ 08:52  Lying BP: --/-- HR: --  Sitting BP: 114/79 HR: 77  Standing BP: --/-- HR: --  Site: --  Mode: --  Orthostatic VS  06-19-23 @ 19:15  Lying BP: --/-- HR: --  Sitting BP: 128/84 HR: 76  Standing BP: 118/74 HR: 76  Site: --  Mode: electronic  Orthostatic VS  06-18-23 @ 20:21  Lying BP: --/-- HR: --  Sitting BP: 101/62 HR: 85  Standing BP: 100/60 HR: 99  Site: --  Mode: --

## 2023-06-19 NOTE — ADVANCED PRACTICE NURSE CONSULT - RECOMMEDATIONS
Will evaluate when patient allow writer  Endorsed to primary team
Recommended to keep wound clean.  Cleanse with normal saline pat dry and apply bacitracin daily.  Monitor for signs and symptoms of infections  Recommended dietician consult for meal planning.  Will be available as needed

## 2023-06-19 NOTE — BH INPATIENT PSYCHIATRY PROGRESS NOTE - NSBHFUPINTERVALHXFT_PSY_A_CORE
Patient is followed up for MDD and depressed mood, s/p suicide attempt. Patient is discussed with nursing team. Covid exposure on unit over the weekend. Covid test performed for patient, results pending.  Pt denies any acute symptoms.  Per nursing patient very tenuous last evening.  Nursing reports [facility]  was called for patient reporting that he cut his abdomen with a knife. Patient stated to staff he cannot stop himself from self-harming again. He was unable to safety plan. He also endorsed CAH's directing him to stab himself.  Per CAROL note it was unclear based on exam if patient in fact cut himself again as wound appears to be healing, though there was fresh blood on the perimeter. Nursing staff raised concerns that patient is reporting self-harm for secondary gain. Due to patient's inability to safety plan, CAROL placed patient on CO lat night  for self-harm risk.  Also Psych emergency was called at 8:21pm. Staff was attempting to check patient's pockets and room check  due to safety concerns after patient reported self-harm. [facility] called for activation of IM medication due to patient agitation. Psych emergency called. Thorazine 50mg and Ativan 3mg IM activated for threat to self and others, but patient ultimately accepted his HS PO medications.   Patient remains compliant with all standing medications, no SE noted.   Patient is observed in his room, accompanied with CO.  He is calm, and cooperative.   Discussed events last night.  No overt psychotic trend. No acute medical issues, wound care daily for lacerations to abdomen. No reports of pain, no drainage, or redness.  Xray to arm unremarkable, PT consent ordered.  POCT:138  VSS. Continue to monitor and provide therapeutic support.    Patient is followed up for MDD and depressed mood, s/p suicide attempt. Patient is discussed with nursing team. Covid exposure on unit over the weekend. Covid test performed for patient, results pending.  Pt denies any acute symptoms.  Per nursing patient very tenuous last evening.  Nursing reports [facility]  was called for patient reporting that he cut his abdomen with a knife. Patient stated to staff he cannot stop himself from self-harming again. He was unable to safety plan. He also endorsed CAH's directing him to stab himself.  Per CAROL note it was unclear based on exam if patient in fact cut himself again as wound appears to be healing, though there was fresh blood on the perimeter. Nursing staff raised concerns that patient is reporting self-harm for secondary gain. Due to patient's inability to safety plan, CAROL placed patient on CO lat night  for self-harm risk.  Also Psych emergency was called at 8:21pm. Staff was attempting to check patient's pockets and room check  due to safety concerns after patient reported self-harm. [facility] called for activation of IM medication due to patient agitation. Psych emergency called. Thorazine 50mg and Ativan 3mg IM activated for threat to self and others, but patient ultimately accepted his HS PO medications.   Patient remains compliant with all standing medications, no SE noted.   Patient is observed in his room, accompanied with CO.  He is calm, and cooperative.   Discussed events last night. Patient reports that he became triggered from another peer because the peer was bothering him and instead of hurting him he hurt himself. Pt reports he went to dinning room and took a plastic knife from the table and started to scratch on his wound on his abdomen (it was discussed last week to try patient off safety tray and provide spoon, however upon further discussion writer decided to not give order for spoon, pt was on finger foods still receiving safety tray. Pt took plastic knife from the table in dayroom).  Pt will remain on finger food continue to receive safety tray and will eat in his room accompanied with CO.    No overt psychotic trend. No acute medical issues, reorder wound consult for  lacerations to abdomen. No reports of pain, no drainage, or redness.  Xray to arm unremarkable, PT consent ordered.  POCT:138  VSS. Continue to monitor and provide therapeutic support.

## 2023-06-19 NOTE — BH INPATIENT PSYCHIATRY PROGRESS NOTE - CURRENT MEDICATION
MEDICATIONS  (STANDING):  atorvastatin 10 milliGRAM(s) Oral at bedtime  bacitracin   Ointment 1 Application(s) Topical two times a day  dextrose 5%. 1000 milliLiter(s) (100 mL/Hr) IV Continuous <Continuous>  dextrose 5%. 1000 milliLiter(s) (50 mL/Hr) IV Continuous <Continuous>  dextrose 50% Injectable 25 Gram(s) IV Push once  dextrose 50% Injectable 12.5 Gram(s) IV Push once  divalproex DR 1000 milliGRAM(s) Oral at bedtime  divalproex  milliGRAM(s) Oral daily  ferrous    sulfate 325 milliGRAM(s) Oral at bedtime  gabapentin 400 milliGRAM(s) Oral two times a day  glucagon  Injectable 1 milliGRAM(s) IntraMuscular once  guanFACINE ER 4 milliGRAM(s) Oral at bedtime  insulin lispro (ADMELOG) corrective regimen sliding scale   SubCutaneous three times a day before meals  levothyroxine 50 MICROGram(s) Oral daily  metFORMIN 1000 milliGRAM(s) Oral daily  mirtazapine 30 milliGRAM(s) Oral at bedtime  risperiDONE   Tablet 4 milliGRAM(s) Oral two times a day  tamsulosin 0.4 milliGRAM(s) Oral at bedtime    MEDICATIONS  (PRN):  albuterol    90 MICROgram(s) HFA Inhaler 2 Puff(s) Inhalation every 6 hours PRN wheezing  chlorproMAZINE    Injectable 50 milliGRAM(s) IntraMuscular once PRN severe aggression  chlorproMAZINE    Injectable 50 milliGRAM(s) IntraMuscular once PRN agitation  chlorproMAZINE    Tablet 50 milliGRAM(s) Oral every 6 hours PRN aggression  dextrose Oral Gel 15 Gram(s) Oral once PRN Blood Glucose LESS THAN 70 milliGRAM(s)/deciliter  diphenhydrAMINE 50 milliGRAM(s) Oral once PRN wheezing  LORazepam     Tablet 2 milliGRAM(s) Oral every 4 hours PRN aggression  LORazepam   Injectable 3 milliGRAM(s) IntraMuscular once PRN severe agitation  LORazepam   Injectable 3 milliGRAM(s) IntraMuscular once PRN severe agitation   MEDICATIONS  (STANDING):  atorvastatin 10 milliGRAM(s) Oral at bedtime  bacitracin   Ointment 1 Application(s) Topical two times a day  clonazePAM  Tablet 0.5 milliGRAM(s) Oral three times a day  dextrose 5%. 1000 milliLiter(s) (100 mL/Hr) IV Continuous <Continuous>  dextrose 5%. 1000 milliLiter(s) (50 mL/Hr) IV Continuous <Continuous>  dextrose 50% Injectable 25 Gram(s) IV Push once  dextrose 50% Injectable 12.5 Gram(s) IV Push once  divalproex DR 1000 milliGRAM(s) Oral at bedtime  divalproex  milliGRAM(s) Oral daily  ferrous    sulfate 325 milliGRAM(s) Oral at bedtime  gabapentin 400 milliGRAM(s) Oral two times a day  glucagon  Injectable 1 milliGRAM(s) IntraMuscular once  guanFACINE ER 4 milliGRAM(s) Oral at bedtime  insulin lispro (ADMELOG) corrective regimen sliding scale   SubCutaneous three times a day before meals  levothyroxine 50 MICROGram(s) Oral daily  metFORMIN 1000 milliGRAM(s) Oral daily  mirtazapine 30 milliGRAM(s) Oral at bedtime  risperiDONE   Tablet 4 milliGRAM(s) Oral two times a day  tamsulosin 0.4 milliGRAM(s) Oral at bedtime    MEDICATIONS  (PRN):  albuterol    90 MICROgram(s) HFA Inhaler 2 Puff(s) Inhalation every 6 hours PRN wheezing  chlorproMAZINE    Injectable 50 milliGRAM(s) IntraMuscular once PRN agitation  chlorproMAZINE    Injectable 50 milliGRAM(s) IntraMuscular once PRN severe aggression  chlorproMAZINE    Tablet 50 milliGRAM(s) Oral every 6 hours PRN aggression  dextrose Oral Gel 15 Gram(s) Oral once PRN Blood Glucose LESS THAN 70 milliGRAM(s)/deciliter  diphenhydrAMINE 50 milliGRAM(s) Oral once PRN wheezing  LORazepam     Tablet 2 milliGRAM(s) Oral every 4 hours PRN aggression  LORazepam   Injectable 3 milliGRAM(s) IntraMuscular once PRN severe agitation  LORazepam   Injectable 3 milliGRAM(s) IntraMuscular once PRN severe agitation   MEDICATIONS  (STANDING):  atorvastatin 10 milliGRAM(s) Oral at bedtime  bacitracin   Ointment 1 Application(s) Topical two times a day  clonazePAM  Tablet 1 milliGRAM(s) Oral three times a day  divalproex ER 1500 milliGRAM(s) Oral at bedtime  ferrous    sulfate 325 milliGRAM(s) Oral at bedtime  gabapentin 400 milliGRAM(s) Oral two times a day  glucagon  Injectable 1 milliGRAM(s) IntraMuscular once  guanFACINE ER 4 milliGRAM(s) Oral at bedtime  insulin lispro (ADMELOG) corrective regimen sliding scale   SubCutaneous three times a day before meals  levothyroxine 50 MICROGram(s) Oral daily  metFORMIN 1000 milliGRAM(s) Oral daily  risperiDONE   Tablet 4 milliGRAM(s) Oral two times a day  tamsulosin 0.4 milliGRAM(s) Oral at bedtime    MEDICATIONS  (PRN):  albuterol    90 MICROgram(s) HFA Inhaler 2 Puff(s) Inhalation every 6 hours PRN wheezing  aluminum hydroxide/magnesium hydroxide/simethicone Suspension 30 milliLiter(s) Oral every 4 hours PRN Dyspepsia  chlorproMAZINE    Injectable 50 milliGRAM(s) IntraMuscular once PRN severe aggression  chlorproMAZINE    Tablet 50 milliGRAM(s) Oral every 6 hours PRN aggression  dextrose Oral Gel 15 Gram(s) Oral once PRN Blood Glucose LESS THAN 70 milliGRAM(s)/deciliter  LORazepam     Tablet 2 milliGRAM(s) Oral every 6 hours PRN Aggression  LORazepam   Injectable 3 milliGRAM(s) IntraMuscular once PRN severe aggression

## 2023-06-19 NOTE — BH INPATIENT PSYCHIATRY PROGRESS NOTE - PRN MEDS
MEDICATIONS  (PRN):  albuterol    90 MICROgram(s) HFA Inhaler 2 Puff(s) Inhalation every 6 hours PRN wheezing  chlorproMAZINE    Injectable 50 milliGRAM(s) IntraMuscular once PRN severe aggression  chlorproMAZINE    Injectable 50 milliGRAM(s) IntraMuscular once PRN agitation  chlorproMAZINE    Tablet 50 milliGRAM(s) Oral every 6 hours PRN aggression  dextrose Oral Gel 15 Gram(s) Oral once PRN Blood Glucose LESS THAN 70 milliGRAM(s)/deciliter  diphenhydrAMINE 50 milliGRAM(s) Oral once PRN wheezing  LORazepam     Tablet 2 milliGRAM(s) Oral every 4 hours PRN aggression  LORazepam   Injectable 3 milliGRAM(s) IntraMuscular once PRN severe agitation  LORazepam   Injectable 3 milliGRAM(s) IntraMuscular once PRN severe agitation   MEDICATIONS  (PRN):  albuterol    90 MICROgram(s) HFA Inhaler 2 Puff(s) Inhalation every 6 hours PRN wheezing  chlorproMAZINE    Injectable 50 milliGRAM(s) IntraMuscular once PRN agitation  chlorproMAZINE    Injectable 50 milliGRAM(s) IntraMuscular once PRN severe aggression  chlorproMAZINE    Tablet 50 milliGRAM(s) Oral every 6 hours PRN aggression  dextrose Oral Gel 15 Gram(s) Oral once PRN Blood Glucose LESS THAN 70 milliGRAM(s)/deciliter  diphenhydrAMINE 50 milliGRAM(s) Oral once PRN wheezing  LORazepam     Tablet 2 milliGRAM(s) Oral every 4 hours PRN aggression  LORazepam   Injectable 3 milliGRAM(s) IntraMuscular once PRN severe agitation  LORazepam   Injectable 3 milliGRAM(s) IntraMuscular once PRN severe agitation   MEDICATIONS  (PRN):  albuterol    90 MICROgram(s) HFA Inhaler 2 Puff(s) Inhalation every 6 hours PRN wheezing  aluminum hydroxide/magnesium hydroxide/simethicone Suspension 30 milliLiter(s) Oral every 4 hours PRN Dyspepsia  chlorproMAZINE    Injectable 50 milliGRAM(s) IntraMuscular once PRN severe aggression  chlorproMAZINE    Tablet 50 milliGRAM(s) Oral every 6 hours PRN aggression  dextrose Oral Gel 15 Gram(s) Oral once PRN Blood Glucose LESS THAN 70 milliGRAM(s)/deciliter  LORazepam     Tablet 2 milliGRAM(s) Oral every 6 hours PRN Aggression  LORazepam   Injectable 3 milliGRAM(s) IntraMuscular once PRN severe aggression

## 2023-06-19 NOTE — BH INPATIENT PSYCHIATRY PROGRESS NOTE - NSBHASSESSSUMMFT_PSY_ALL_CORE
33 year old single male, domiciled at [facility]. Patient has past psychiatric history of moderate ID, ASD, impulse control disorder, factitious disorder, HAVEN, mood disorders, PTSD and ADHD.  Patient has extensive history of inpatient hospitalization and ED visits. Most recently patient was discharged from [facility] on 6/6/23.  Patient is re-hospitalized with a primary problem self inflicting laceration to abdomen, CAH to hurt self and others.  Patient admitted to [facility] on a 9.27 legal status.   Patient requires inpatient hospitalization due to symptoms of mental illness so severe that they significantly interfere with activities of daily living, and presents a potential danger to self as a result of acute decompensation with s/p  SA,  he is requiring 24-hour care at this time as a result, for psychiatric stabilization and safety.     Plan:  >Legal: 9.27  >Obs: Continue CO for SIB  >Psychiatric Meds: Increase Depakote 1000mg qhs and 500mg daily, increase Intuniv ER 4mg, Risperdal 4mg BID, Remeron 30mg qhs. Observe for tolerability and efficacy. Patient had been poorly adherent prior to admission.   PRN medications:  Ativan 2mg oral Q6HR PRN for agitation and anxiety.  Haldol 5mg oral Q6HR PRN for agitation.   Benadryl 50mg oral Q6HR PRN for agitation.   >Labs:  labs reviewed, no acute findings. U-tox negative, VPA level 61.40. Xray to left shoulder and clavicle resulted unremarkable.  Hold antipsychotics if QTc >500  >Medical: S/P abdomen lacerations.  Medicine and wound care consultations needed.  Patient with consistently stable VS.  During the course of treatment, will collaborate with medical team to manage medical issues.  >Diet: Safety tray  >Social: milieu/structured therapy  >Treatment Interventions: Groups and Individual Therapy/CBT, Motivational counseling for substance abuse related issues.   >Dispo: Collateral and dispo planning pending further symptom and medication optimization       33 year old single male, domiciled at [facility]. Patient has past psychiatric history of moderate ID, ASD, impulse control disorder, factitious disorder, HAVEN, mood disorders, PTSD and ADHD.  Patient has extensive history of inpatient hospitalization and ED visits. Most recently patient was discharged from [facility] on 6/6/23.  Patient is re-hospitalized with a primary problem self inflicting laceration to abdomen, CAH to hurt self and others.  Patient admitted to [facility] on a 9.27 legal status.   Patient requires inpatient hospitalization due to symptoms of mental illness so severe that they significantly interfere with activities of daily living, and presents a potential danger to self as a result of acute decompensation with s/p  SA,  he is requiring 24-hour care at this time as a result, for psychiatric stabilization and safety.     Plan:  >Legal: 9.27  >Obs: Continue CO for SIB  >Psychiatric Meds:   -Depakote 1000mg qhs and 500mg daily,   - Intuniv ER 4mg,   -Risperdal 4mg BID,   -Remeron 30mg qhs.   -add klonopin 0.5mg TID  Ativan 2mg oral Q6HR PRN for agitation and anxiety.  Haldol 5mg oral Q6HR PRN for agitation.   Benadryl 50mg oral Q6HR PRN for agitation.   >Labs:  labs reviewed, no acute findings. U-tox negative, VPA level 61.40. Xray to left shoulder and clavicle resulted unremarkable.  Hold antipsychotics if QTc >500  >Medical: S/P abdomen lacerations.  Medicine and wound care consultations needed.  Patient with consistently stable VS.  During the course of treatment, will collaborate with medical team to manage medical issues.  >Diet: Safety tray  >Consult: Wound consult and PT  >Social: milieu/structured therapy  >Treatment Interventions: Groups and Individual Therapy/CBT, Motivational counseling for substance abuse related issues.   >Dispo: Collateral and dispo planning pending further symptom and medication optimization       33 year old single male, domiciled at [facility]. Patient has past psychiatric history of moderate ID, ASD, impulse control disorder, factitious disorder, HAVEN, mood disorders, PTSD and ADHD.  Patient has extensive history of inpatient hospitalization and ED visits. Most recently patient was discharged from [facility] on 6/6/23.  Patient is re-hospitalized with a primary problem self inflicting laceration to abdomen, CAH to hurt self and others.  Patient admitted to [facility] on a 9.27 legal status.   Patient requires inpatient hospitalization due to symptoms of mental illness so severe that they significantly interfere with activities of daily living, and presents a potential danger to self as a result of acute decompensation with s/p  SA,  he is requiring 24-hour care at this time as a result, for psychiatric stabilization and safety.     Plan:  >Legal: 9.27  >Obs: Continue CO for SIB  >Psychiatric Meds:   -Depakote 1000mg qhs and 500mg daily,   - Intuniv ER 4mg,   -Risperdal 4mg BID,   -Remeron 30mg qhs.   -Clonazepam 1mg TID  Ativan 2mg oral Q6HR PRN for agitation and anxiety.  Haldol 5mg oral Q6HR PRN for agitation.   Benadryl 50mg oral Q6HR PRN for agitation.   >Labs:  labs reviewed, no acute findings. U-tox negative, VPA level 61.40. Xray to left shoulder and clavicle resulted unremarkable.  Hold antipsychotics if QTc >500  >Medical: S/P abdomen lacerations.  Medicine and wound care consultations needed.  Patient with consistently stable VS.  During the course of treatment, will collaborate with medical team to manage medical issues.  >Diet: Safety tray  >Consult: Wound consult and PT  >Social: milieu/structured therapy  >Treatment Interventions: Groups and Individual Therapy/CBT, Motivational counseling for substance abuse related issues.   >Dispo: Collateral and dispo planning pending further symptom and medication optimization

## 2023-06-19 NOTE — BH INPATIENT PSYCHIATRY ASSESSMENT NOTE - ABUSE / TRAUMA HISTORY
Patient called to let Dr. Emerson Jasso know that he got a new mobile phone number, [phone]. Patient didn't want to miss a call from Dr. Emerson Jasso. Yes

## 2023-06-19 NOTE — ED ADULT NURSE NOTE - ISOLATION TYPE:
Assessment/Plan:    Carly Jorge was seen today for annual exam.    Diagnoses and all orders for this visit:    Encounter for well woman exam    Screening for colon cancer        No follow-up provider specified. CORNELL Souza expressed understanding of this plan. An AVS was printed and given to the patient.      ----------------------------------------------------------------------    Chief Complaint   Patient presents with    Annual Exam       History of Present Illness:  EWL annual well woman, last exam was around 10 years ago  She has no breast complaints or concerns  She has no pelvic complaints or concerns  (On exam, she has  small area of excoriation on her clitoral quiroz and she states that there is no pain or itching or hx of herpes). She will monitor for sxs  Her mom had ovarian cancer and  from it. Pt was advised to seek care if she were to develop bloating, early satiety, pelvic pain, urinary changes- as soon as she recognizes the changes  She has no pain with intercourse or hot flashes    She had menopause about 5 years ago  No hx of colon cancer screening in the past       Past Medical History:   Diagnosis Date    Endometriosis        No current outpatient medications on file. No current facility-administered medications for this visit.        Allergies   Allergen Reactions    Penicillins Hives       Social History     Tobacco Use    Smoking status: Former     Types: Cigarettes     Quit date: 10/12/2013     Years since quittin.6    Smokeless tobacco: Never   Substance Use Topics    Alcohol use: Yes    Drug use: No       Family History   Problem Relation Age of Onset    Diabetes Mother         cervical cancer    Cancer Mother         cervical       Physical Exam:     LMP 2018 (Approximate)     A&Ox3  WDWN NAD  Respirations normal and non labored  Breast exam- cristina neg for mass, tenderness, skin color changes, dimpling or retractions  Pelvic exam- ext neg for None

## 2023-06-20 LAB
GLUCOSE BLDC GLUCOMTR-MCNC: 132 MG/DL — HIGH (ref 70–99)
GLUCOSE BLDC GLUCOMTR-MCNC: 141 MG/DL — HIGH (ref 70–99)
GLUCOSE BLDC GLUCOMTR-MCNC: 155 MG/DL — HIGH (ref 70–99)
GLUCOSE BLDC GLUCOMTR-MCNC: 187 MG/DL — HIGH (ref 70–99)

## 2023-06-20 PROCEDURE — 93010 ELECTROCARDIOGRAM REPORT: CPT

## 2023-06-20 PROCEDURE — 99232 SBSQ HOSP IP/OBS MODERATE 35: CPT

## 2023-06-20 RX ORDER — CHLORPROMAZINE HCL 10 MG
50 TABLET ORAL ONCE
Refills: 0 | Status: COMPLETED | OUTPATIENT
Start: 2023-06-20 | End: 2023-06-20

## 2023-06-20 RX ADMIN — Medication 1 MILLIGRAM(S): at 10:18

## 2023-06-20 RX ADMIN — Medication 50 MILLIGRAM(S): at 09:16

## 2023-06-20 RX ADMIN — DIVALPROEX SODIUM 1500 MILLIGRAM(S): 500 TABLET, DELAYED RELEASE ORAL at 20:26

## 2023-06-20 RX ADMIN — GUANFACINE 4 MILLIGRAM(S): 3 TABLET, EXTENDED RELEASE ORAL at 20:27

## 2023-06-20 RX ADMIN — Medication 2 MILLIGRAM(S): at 20:27

## 2023-06-20 RX ADMIN — Medication 1 MILLIGRAM(S): at 20:26

## 2023-06-20 RX ADMIN — GABAPENTIN 400 MILLIGRAM(S): 400 CAPSULE ORAL at 10:20

## 2023-06-20 RX ADMIN — Medication 50 MICROGRAM(S): at 06:27

## 2023-06-20 RX ADMIN — Medication 50 MILLIGRAM(S): at 20:26

## 2023-06-20 RX ADMIN — ALBUTEROL 2 PUFF(S): 90 AEROSOL, METERED ORAL at 17:52

## 2023-06-20 RX ADMIN — Medication 325 MILLIGRAM(S): at 20:26

## 2023-06-20 RX ADMIN — METFORMIN HYDROCHLORIDE 1000 MILLIGRAM(S): 850 TABLET ORAL at 10:19

## 2023-06-20 RX ADMIN — TAMSULOSIN HYDROCHLORIDE 0.4 MILLIGRAM(S): 0.4 CAPSULE ORAL at 20:27

## 2023-06-20 RX ADMIN — Medication 3 MILLIGRAM(S): at 09:16

## 2023-06-20 RX ADMIN — RISPERIDONE 4 MILLIGRAM(S): 4 TABLET ORAL at 20:27

## 2023-06-20 RX ADMIN — Medication 1 APPLICATION(S): at 20:27

## 2023-06-20 RX ADMIN — RISPERIDONE 4 MILLIGRAM(S): 4 TABLET ORAL at 10:20

## 2023-06-20 RX ADMIN — ATORVASTATIN CALCIUM 10 MILLIGRAM(S): 80 TABLET, FILM COATED ORAL at 20:26

## 2023-06-20 RX ADMIN — GABAPENTIN 400 MILLIGRAM(S): 400 CAPSULE ORAL at 20:26

## 2023-06-20 RX ADMIN — Medication 30 MILLILITER(S): at 17:51

## 2023-06-20 NOTE — BH INPATIENT PSYCHIATRY PROGRESS NOTE - NSBHMETABOLIC_PSY_ALL_CORE_FT
BMI: BMI (kg/m2): 41 (06-13-23 @ 13:20)  HbA1c: A1C with Estimated Average Glucose Result: 6.5 % (05-31-23 @ 09:26)    Glucose: POCT Blood Glucose.: 210 mg/dL (06-19-23 @ 20:21)    BP: --  Lipid Panel: Date/Time: 05-31-23 @ 09:26  Cholesterol, Serum: 128  Direct LDL: --  HDL Cholesterol, Serum: 31  Total Cholesterol/HDL Ration Measurement: --  Triglycerides, Serum: 157   BMI: BMI (kg/m2): 41 (06-13-23 @ 13:20)  HbA1c: A1C with Estimated Average Glucose Result: 6.5 % (05-31-23 @ 09:26)    Glucose: POCT Blood Glucose.: 141 mg/dL (06-20-23 @ 07:43)    BP: --  Lipid Panel: Date/Time: 05-31-23 @ 09:26  Cholesterol, Serum: 128  Direct LDL: --  HDL Cholesterol, Serum: 31  Total Cholesterol/HDL Ration Measurement: --  Triglycerides, Serum: 157   BMI: BMI (kg/m2): 41.3 (06-24-23 @ 00:30)  HbA1c: A1C with Estimated Average Glucose Result: 6.7 % (06-24-23 @ 06:10)    Glucose: POCT Blood Glucose.: 106 mg/dL (06-24-23 @ 09:14)    BP: 98/70 (06-22-23 @ 19:29) (98/70 - 111/88)  Lipid Panel: Date/Time: 06-24-23 @ 06:10  Cholesterol, Serum: 118  Direct LDL: --  HDL Cholesterol, Serum: 25  Total Cholesterol/HDL Ration Measurement: --  Triglycerides, Serum: 155

## 2023-06-20 NOTE — BH INPATIENT PSYCHIATRY PROGRESS NOTE - PRN MEDS
MEDICATIONS  (PRN):  albuterol    90 MICROgram(s) HFA Inhaler 2 Puff(s) Inhalation every 6 hours PRN wheezing  chlorproMAZINE    Injectable 50 milliGRAM(s) IntraMuscular once PRN severe aggression  chlorproMAZINE    Tablet 50 milliGRAM(s) Oral every 6 hours PRN aggression  dextrose Oral Gel 15 Gram(s) Oral once PRN Blood Glucose LESS THAN 70 milliGRAM(s)/deciliter  LORazepam     Tablet 2 milliGRAM(s) Oral every 6 hours PRN Aggression  LORazepam   Injectable 2 milliGRAM(s) IntraMuscular once PRN Severe agitation   MEDICATIONS  (PRN):  albuterol    90 MICROgram(s) HFA Inhaler 2 Puff(s) Inhalation every 6 hours PRN wheezing  chlorproMAZINE    Injectable 50 milliGRAM(s) IntraMuscular once PRN severe aggression  chlorproMAZINE    Injectable 50 milliGRAM(s) IntraMuscular once PRN severe aggression  chlorproMAZINE    Tablet 50 milliGRAM(s) Oral every 6 hours PRN aggression  dextrose Oral Gel 15 Gram(s) Oral once PRN Blood Glucose LESS THAN 70 milliGRAM(s)/deciliter  LORazepam     Tablet 2 milliGRAM(s) Oral every 6 hours PRN Aggression  LORazepam   Injectable 3 milliGRAM(s) IntraMuscular once PRN severe aggression  LORazepam   Injectable 3 milliGRAM(s) IntraMuscular once PRN severe aggression   MEDICATIONS  (PRN):  albuterol    90 MICROgram(s) HFA Inhaler 2 Puff(s) Inhalation every 6 hours PRN wheezing  chlorproMAZINE    Injectable 50 milliGRAM(s) IntraMuscular once PRN severe aggression  chlorproMAZINE    Tablet 50 milliGRAM(s) Oral every 6 hours PRN aggression  dextrose Oral Gel 15 Gram(s) Oral once PRN Blood Glucose LESS THAN 70 milliGRAM(s)/deciliter  LORazepam     Tablet 2 milliGRAM(s) Oral every 6 hours PRN Aggression  LORazepam   Injectable 3 milliGRAM(s) IntraMuscular once PRN severe aggression   MEDICATIONS  (PRN):

## 2023-06-20 NOTE — BH INPATIENT PSYCHIATRY PROGRESS NOTE - NSBHASSESSSUMMFT_PSY_ALL_CORE
33 year old single male, domiciled at [facility]. Patient has past psychiatric history of moderate ID, ASD, impulse control disorder, factitious disorder, HAVEN, mood disorders, PTSD and ADHD.  Patient has extensive history of inpatient hospitalization and ED visits. Most recently patient was discharged from [facility] on 6/6/23.  Patient is re-hospitalized with a primary problem self inflicting laceration to abdomen, CAH to hurt self and others.  Patient admitted to [facility] on a 9.27 legal status.   Patient requires inpatient hospitalization due to symptoms of mental illness so severe that they significantly interfere with activities of daily living, and presents a potential danger to self as a result of acute decompensation with s/p  SA,  he is requiring 24-hour care at this time as a result, for psychiatric stabilization and safety.     Plan:  >Legal: 9.27  >Obs: Continue CO for SIB  >Psychiatric Meds:   -Depakote 1000mg qhs and 500mg daily,   - Intuniv ER 4mg,   -Risperdal 4mg BID,   -Remeron 30mg qhs.   -add klonopin 0.5mg TID  Ativan 2mg oral Q6HR PRN for agitation and anxiety.  Haldol 5mg oral Q6HR PRN for agitation.   Benadryl 50mg oral Q6HR PRN for agitation.   >Labs:  labs reviewed, no acute findings. U-tox negative, VPA level 61.40. Xray to left shoulder and clavicle resulted unremarkable.  Hold antipsychotics if QTc >500  >Medical: S/P abdomen lacerations.  Medicine and wound care consultations needed.  Patient with consistently stable VS.  During the course of treatment, will collaborate with medical team to manage medical issues.  >Diet: Safety tray  >Consult: Wound consult and PT  >Social: milieu/structured therapy  >Treatment Interventions: Groups and Individual Therapy/CBT, Motivational counseling for substance abuse related issues.   >Dispo: Collateral and dispo planning pending further symptom and medication optimization

## 2023-06-20 NOTE — BH INPATIENT PSYCHIATRY PROGRESS NOTE - NSBHCHARTREVIEWVS_PSY_A_CORE FT
Vital Signs Last 24 Hrs  T(C): 36.8 (06-19-23 @ 19:15), Max: 36.8 (06-19-23 @ 19:15)  T(F): 98.2 (06-19-23 @ 19:15), Max: 98.2 (06-19-23 @ 19:15)  HR: --  BP: --  BP(mean): --  RR: 16 (06-19-23 @ 09:09) (16 - 16)  SpO2: --    Orthostatic VS  06-19-23 @ 19:15  Lying BP: --/-- HR: --  Sitting BP: 128/84 HR: 76  Standing BP: 118/74 HR: 76  Site: --  Mode: electronic  Orthostatic VS  06-18-23 @ 20:21  Lying BP: --/-- HR: --  Sitting BP: 101/62 HR: 85  Standing BP: 100/60 HR: 99  Site: --  Mode: --  Orthostatic VS  06-18-23 @ 08:20  Lying BP: --/-- HR: --  Sitting BP: 119/62 HR: 61  Standing BP: --/-- HR: --  Site: --  Mode: --   Vital Signs Last 24 Hrs  T(C): 36.8 (06-19-23 @ 19:15), Max: 36.8 (06-19-23 @ 19:15)  T(F): 98.2 (06-19-23 @ 19:15), Max: 98.2 (06-19-23 @ 19:15)  HR: --  BP: --  BP(mean): --  RR: 16 (06-19-23 @ 09:09) (16 - 16)  SpO2: --    Orthostatic VS  06-20-23 @ 08:52  Lying BP: --/-- HR: --  Sitting BP: 114/79 HR: 77  Standing BP: --/-- HR: --  Site: --  Mode: --  Orthostatic VS  06-19-23 @ 19:15  Lying BP: --/-- HR: --  Sitting BP: 128/84 HR: 76  Standing BP: 118/74 HR: 76  Site: --  Mode: electronic  Orthostatic VS  06-18-23 @ 20:21  Lying BP: --/-- HR: --  Sitting BP: 101/62 HR: 85  Standing BP: 100/60 HR: 99  Site: --  Mode: --   Vital Signs Last 24 Hrs  T(C): 36.8 (06-19-23 @ 19:15), Max: 36.8 (06-19-23 @ 19:15)  T(F): 98.2 (06-19-23 @ 19:15), Max: 98.2 (06-19-23 @ 19:15)  HR: --  BP: --  BP(mean): --  RR: --  SpO2: --    Orthostatic VS  06-20-23 @ 08:52  Lying BP: --/-- HR: --  Sitting BP: 114/79 HR: 77  Standing BP: --/-- HR: --  Site: --  Mode: --  Orthostatic VS  06-19-23 @ 19:15  Lying BP: --/-- HR: --  Sitting BP: 128/84 HR: 76  Standing BP: 118/74 HR: 76  Site: --  Mode: electronic  Orthostatic VS  06-18-23 @ 20:21  Lying BP: --/-- HR: --  Sitting BP: 101/62 HR: 85  Standing BP: 100/60 HR: 99  Site: --  Mode: --   Vital Signs Last 24 Hrs  T(C): 36.4 (06-24-23 @ 06:20), Max: 36.9 (06-23-23 @ 12:00)  T(F): 97.5 (06-24-23 @ 06:20), Max: 98.4 (06-23-23 @ 12:00)  HR: 78 (06-24-23 @ 06:20) (62 - 88)  BP: 107/68 (06-24-23 @ 06:20) (96/72 - 142/90)  BP(mean): --  RR: 19 (06-24-23 @ 06:20) (17 - 20)  SpO2: 97% (06-24-23 @ 06:20) (97% - 100%)

## 2023-06-20 NOTE — BH INPATIENT PSYCHIATRY PROGRESS NOTE - CURRENT MEDICATION
MEDICATIONS  (STANDING):  atorvastatin 10 milliGRAM(s) Oral at bedtime  bacitracin   Ointment 1 Application(s) Topical two times a day  clonazePAM  Tablet 1 milliGRAM(s) Oral three times a day  dextrose 5%. 1000 milliLiter(s) (50 mL/Hr) IV Continuous <Continuous>  dextrose 5%. 1000 milliLiter(s) (100 mL/Hr) IV Continuous <Continuous>  dextrose 50% Injectable 25 Gram(s) IV Push once  dextrose 50% Injectable 12.5 Gram(s) IV Push once  divalproex ER 1500 milliGRAM(s) Oral at bedtime  ferrous    sulfate 325 milliGRAM(s) Oral at bedtime  gabapentin 400 milliGRAM(s) Oral two times a day  glucagon  Injectable 1 milliGRAM(s) IntraMuscular once  guanFACINE ER 4 milliGRAM(s) Oral at bedtime  insulin lispro (ADMELOG) corrective regimen sliding scale   SubCutaneous three times a day before meals  levothyroxine 50 MICROGram(s) Oral daily  metFORMIN 1000 milliGRAM(s) Oral daily  risperiDONE   Tablet 4 milliGRAM(s) Oral two times a day  tamsulosin 0.4 milliGRAM(s) Oral at bedtime    MEDICATIONS  (PRN):  albuterol    90 MICROgram(s) HFA Inhaler 2 Puff(s) Inhalation every 6 hours PRN wheezing  chlorproMAZINE    Injectable 50 milliGRAM(s) IntraMuscular once PRN severe aggression  chlorproMAZINE    Tablet 50 milliGRAM(s) Oral every 6 hours PRN aggression  dextrose Oral Gel 15 Gram(s) Oral once PRN Blood Glucose LESS THAN 70 milliGRAM(s)/deciliter  LORazepam     Tablet 2 milliGRAM(s) Oral every 6 hours PRN Aggression  LORazepam   Injectable 2 milliGRAM(s) IntraMuscular once PRN Severe agitation   MEDICATIONS  (STANDING):  atorvastatin 10 milliGRAM(s) Oral at bedtime  bacitracin   Ointment 1 Application(s) Topical two times a day  clonazePAM  Tablet 1 milliGRAM(s) Oral three times a day  dextrose 5%. 1000 milliLiter(s) (100 mL/Hr) IV Continuous <Continuous>  dextrose 5%. 1000 milliLiter(s) (50 mL/Hr) IV Continuous <Continuous>  dextrose 50% Injectable 12.5 Gram(s) IV Push once  dextrose 50% Injectable 25 Gram(s) IV Push once  divalproex ER 1500 milliGRAM(s) Oral at bedtime  ferrous    sulfate 325 milliGRAM(s) Oral at bedtime  gabapentin 400 milliGRAM(s) Oral two times a day  glucagon  Injectable 1 milliGRAM(s) IntraMuscular once  guanFACINE ER 4 milliGRAM(s) Oral at bedtime  insulin lispro (ADMELOG) corrective regimen sliding scale   SubCutaneous three times a day before meals  levothyroxine 50 MICROGram(s) Oral daily  metFORMIN 1000 milliGRAM(s) Oral daily  risperiDONE   Tablet 4 milliGRAM(s) Oral two times a day  tamsulosin 0.4 milliGRAM(s) Oral at bedtime    MEDICATIONS  (PRN):  albuterol    90 MICROgram(s) HFA Inhaler 2 Puff(s) Inhalation every 6 hours PRN wheezing  chlorproMAZINE    Injectable 50 milliGRAM(s) IntraMuscular once PRN severe aggression  chlorproMAZINE    Injectable 50 milliGRAM(s) IntraMuscular once PRN severe aggression  chlorproMAZINE    Tablet 50 milliGRAM(s) Oral every 6 hours PRN aggression  dextrose Oral Gel 15 Gram(s) Oral once PRN Blood Glucose LESS THAN 70 milliGRAM(s)/deciliter  LORazepam     Tablet 2 milliGRAM(s) Oral every 6 hours PRN Aggression  LORazepam   Injectable 3 milliGRAM(s) IntraMuscular once PRN severe aggression  LORazepam   Injectable 3 milliGRAM(s) IntraMuscular once PRN severe aggression   MEDICATIONS  (STANDING):  atorvastatin 10 milliGRAM(s) Oral at bedtime  bacitracin   Ointment 1 Application(s) Topical two times a day  clonazePAM  Tablet 1 milliGRAM(s) Oral three times a day  dextrose 5%. 1000 milliLiter(s) (50 mL/Hr) IV Continuous <Continuous>  dextrose 5%. 1000 milliLiter(s) (100 mL/Hr) IV Continuous <Continuous>  dextrose 50% Injectable 12.5 Gram(s) IV Push once  dextrose 50% Injectable 25 Gram(s) IV Push once  divalproex ER 1500 milliGRAM(s) Oral at bedtime  ferrous    sulfate 325 milliGRAM(s) Oral at bedtime  gabapentin 400 milliGRAM(s) Oral two times a day  glucagon  Injectable 1 milliGRAM(s) IntraMuscular once  guanFACINE ER 4 milliGRAM(s) Oral at bedtime  insulin lispro (ADMELOG) corrective regimen sliding scale   SubCutaneous three times a day before meals  levothyroxine 50 MICROGram(s) Oral daily  metFORMIN 1000 milliGRAM(s) Oral daily  risperiDONE   Tablet 4 milliGRAM(s) Oral two times a day  tamsulosin 0.4 milliGRAM(s) Oral at bedtime    MEDICATIONS  (PRN):  albuterol    90 MICROgram(s) HFA Inhaler 2 Puff(s) Inhalation every 6 hours PRN wheezing  chlorproMAZINE    Injectable 50 milliGRAM(s) IntraMuscular once PRN severe aggression  chlorproMAZINE    Tablet 50 milliGRAM(s) Oral every 6 hours PRN aggression  dextrose Oral Gel 15 Gram(s) Oral once PRN Blood Glucose LESS THAN 70 milliGRAM(s)/deciliter  LORazepam     Tablet 2 milliGRAM(s) Oral every 6 hours PRN Aggression  LORazepam   Injectable 3 milliGRAM(s) IntraMuscular once PRN severe aggression   MEDICATIONS  (STANDING):    MEDICATIONS  (PRN):

## 2023-06-20 NOTE — BH INPATIENT PSYCHIATRY PROGRESS NOTE - NSBHFUPINTERVALHXFT_PSY_A_CORE
Patient is followed up for MDD and depressed mood, s/p suicide attempt, and increasing aggression/violent behaviors. Patient is discussed with nursing team. Covid exposure on unit over the weekend. Covid test performed for patient, results negative.    Per nursing patient very tenuous this morning.   Nursing reports increasing aggression, verbal threats to harm staff, not responding to redirection.  Psych emergency was called at 9:00 am. writer called for activation of IM medication due to patient agitation. Thorazine 50mg and Ativan 3mg IM activated for threat to self and others.    Patient is later observed in his room, accompanied with CO.  Appeared to be asleep, in NAD.   No overt psychotic trend. No acute medical issues, reorder wound consult for  lacerations to abdomen. No reports of pain, no drainage, or redness.  Xray to arm unremarkable, PT consent ordered.  POCT:138  VSS. Continue to monitor and provide therapeutic support.    Patient is followed up for MDD and depressed mood, s/p suicide attempt, and increasing aggression/violent behaviors. Patient is discussed with nursing team. Covid exposure on unit over the weekend. Covid test performed for patient, results negative.    Per nursing patient very tenuous this morning.   Nursing reports increasing aggression, verbal threats to harm staff, and threathen property destruction, not responding to redirection. writer called for activation of IM medication due to patient agitation. Thorazine 50mg and Ativan 3mg IM activated for threat to self and others.  Patient is later observed in his room, accompanied with CO. Attempted to speak with patient, he continued to shout, curse, make demands for dc.  Interview very challenging due to intensity of aggression.  Patient later observed in his room, patient asleep, in NAD.   No overt psychotic trend. No acute medical issues, reorder wound consult for  lacerations to abdomen. No reports of pain, no drainage, or redness.  Xray to arm unremarkable, PT consent ordered.  POCT:138  VSS. Continue to monitor and provide therapeutic support.

## 2023-06-21 LAB
GLUCOSE BLDC GLUCOMTR-MCNC: 131 MG/DL — HIGH (ref 70–99)
GLUCOSE BLDC GLUCOMTR-MCNC: 210 MG/DL — HIGH (ref 70–99)
GLUCOSE BLDC GLUCOMTR-MCNC: 242 MG/DL — HIGH (ref 70–99)

## 2023-06-21 PROCEDURE — 99232 SBSQ HOSP IP/OBS MODERATE 35: CPT

## 2023-06-21 PROCEDURE — 93010 ELECTROCARDIOGRAM REPORT: CPT

## 2023-06-21 RX ORDER — GABAPENTIN 400 MG/1
400 CAPSULE ORAL THREE TIMES A DAY
Refills: 0 | Status: DISCONTINUED | OUTPATIENT
Start: 2023-06-21 | End: 2023-06-23

## 2023-06-21 RX ADMIN — Medication 1 MILLIGRAM(S): at 12:07

## 2023-06-21 RX ADMIN — GABAPENTIN 400 MILLIGRAM(S): 400 CAPSULE ORAL at 09:30

## 2023-06-21 RX ADMIN — Medication 325 MILLIGRAM(S): at 20:03

## 2023-06-21 RX ADMIN — RISPERIDONE 4 MILLIGRAM(S): 4 TABLET ORAL at 09:30

## 2023-06-21 RX ADMIN — Medication 1 MILLIGRAM(S): at 09:30

## 2023-06-21 RX ADMIN — TAMSULOSIN HYDROCHLORIDE 0.4 MILLIGRAM(S): 0.4 CAPSULE ORAL at 20:03

## 2023-06-21 RX ADMIN — Medication 2 MILLIGRAM(S): at 20:03

## 2023-06-21 RX ADMIN — GABAPENTIN 400 MILLIGRAM(S): 400 CAPSULE ORAL at 20:03

## 2023-06-21 RX ADMIN — Medication 1 APPLICATION(S): at 09:30

## 2023-06-21 RX ADMIN — DIVALPROEX SODIUM 1500 MILLIGRAM(S): 500 TABLET, DELAYED RELEASE ORAL at 20:03

## 2023-06-21 RX ADMIN — GABAPENTIN 400 MILLIGRAM(S): 400 CAPSULE ORAL at 12:07

## 2023-06-21 RX ADMIN — RISPERIDONE 4 MILLIGRAM(S): 4 TABLET ORAL at 20:03

## 2023-06-21 RX ADMIN — GUANFACINE 4 MILLIGRAM(S): 3 TABLET, EXTENDED RELEASE ORAL at 20:03

## 2023-06-21 RX ADMIN — Medication 4: at 12:20

## 2023-06-21 RX ADMIN — Medication 1 APPLICATION(S): at 20:05

## 2023-06-21 RX ADMIN — METFORMIN HYDROCHLORIDE 1000 MILLIGRAM(S): 850 TABLET ORAL at 09:30

## 2023-06-21 RX ADMIN — ATORVASTATIN CALCIUM 10 MILLIGRAM(S): 80 TABLET, FILM COATED ORAL at 20:03

## 2023-06-21 NOTE — BH INPATIENT PSYCHIATRY PROGRESS NOTE - NSBHCHARTREVIEWVS_PSY_A_CORE FT
Vital Signs Last 24 Hrs  T(C): --  T(F): --  HR: 98 (06-20-23 @ 16:49) (98 - 98)  BP: 101/70 (06-20-23 @ 16:49) (101/70 - 101/70)  BP(mean): --  RR: 16 (06-20-23 @ 16:55) (16 - 16)  SpO2: 96% (06-20-23 @ 16:55) (96% - 96%)    Orthostatic VS  06-20-23 @ 08:52  Lying BP: --/-- HR: --  Sitting BP: 114/79 HR: 77  Standing BP: --/-- HR: --  Site: --  Mode: --  Orthostatic VS  06-19-23 @ 19:15  Lying BP: --/-- HR: --  Sitting BP: 128/84 HR: 76  Standing BP: 118/74 HR: 76  Site: --  Mode: electronic   Vital Signs Last 24 Hrs  T(C): 36.4 (06-24-23 @ 06:20), Max: 36.9 (06-23-23 @ 12:00)  T(F): 97.5 (06-24-23 @ 06:20), Max: 98.4 (06-23-23 @ 12:00)  HR: 78 (06-24-23 @ 06:20) (62 - 88)  BP: 107/68 (06-24-23 @ 06:20) (96/72 - 142/90)  BP(mean): --  RR: 19 (06-24-23 @ 06:20) (17 - 20)  SpO2: 97% (06-24-23 @ 06:20) (97% - 100%)

## 2023-06-21 NOTE — BH INPATIENT PSYCHIATRY PROGRESS NOTE - NSBHFUPINTERVALHXFT_PSY_A_CORE
Patient is followed up for MDD and depressed mood, s/p suicide attempt, and increasing aggression/violent behaviors. Patient is discussed with nursing team.   Per nursing no interval events. Pt slept through out the night, po prn Thorazine 50mg and Ativan 2mg.   Patient is  observed in his room, accompanied with CO.  patient asleep, in NAD. Refusing to utilize wedge. refused vitals  No overt psychotic trend. No acute medical issues, reorder wound consult for  lacerations to abdomen. No reports of pain, no drainage, or redness.  Xray to arm unremarkable, PT consent ordered.  POCT:138  VSS. Continue to monitor and provide therapeutic support.

## 2023-06-21 NOTE — BH CHART NOTE - NSEVENTNOTEFT_PSY_ALL_CORE
CAROL called as pt complaining of general discomfort, fatigue, chest discomfort, dyspnea. On examination, pt somewhat lethargic, though appears to be fully alert at times as well. He reports that he has been feeling this way since admission. He feels like albuterol PRN hasn't helped, says he used today. Per documentation, pt did not receive Albuterol today. He also reports leg numbness and pain. ROS overall pan positive. Pt received ECG today after complaining of same symptoms to primary team, which was wnl. He repeatedly requests to go to ED.       T(C): --  HR: 89 (06-21-23 @ 18:34) (89 - 89)  BP: 111/88 (06-21-23 @ 18:34) (111/88 - 111/88)  RR: --  SpO2: 96% (06-21-23 @ 18:03) (96% - 96%)    Physical Exam:  Gen: Patient sitting on hospital bed, lethargic at times,   HEENT: NC/AT,  EOMI.    Resp: +mild wheezes at times, no ronchi, or crackles.   CV: Radial pulses 2+ b/l, RRR, no murmurs.   Abd: soft, NTND, no guarding or rigidity. NABS.    Ext: ROM intact, no clubbing, edema, or cyanosis.   Neuro: awake, alert, lethargic at times, grossly oriented.     Assessment:  CAROL called for general discomfort, fatigue, chest discomfort, dyspnea. Physical exam largely wnl, pt status same as admission per RN team. No acute interventions necessary.     Plan:  1. Albuterol inhaler PRN  2. no further medical intervention necessary at this time.   3. will continue to monitor routinely.   4. d/w RN staff

## 2023-06-21 NOTE — BH INPATIENT PSYCHIATRY PROGRESS NOTE - NSBHMETABOLIC_PSY_ALL_CORE_FT
BMI: BMI (kg/m2): 41 (06-13-23 @ 13:20)  HbA1c: A1C with Estimated Average Glucose Result: 6.5 % (05-31-23 @ 09:26)    Glucose: POCT Blood Glucose.: 187 mg/dL (06-20-23 @ 20:11)    BP: 101/70 (06-20-23 @ 16:49) (101/70 - 101/70)  Lipid Panel: Date/Time: 05-31-23 @ 09:26  Cholesterol, Serum: 128  Direct LDL: --  HDL Cholesterol, Serum: 31  Total Cholesterol/HDL Ration Measurement: --  Triglycerides, Serum: 157   BMI: BMI (kg/m2): 41 (06-13-23 @ 13:20)  HbA1c: A1C with Estimated Average Glucose Result: 6.5 % (05-31-23 @ 09:26)    Glucose: POCT Blood Glucose.: 210 mg/dL (06-21-23 @ 11:41)    BP: 101/70 (06-20-23 @ 16:49) (101/70 - 101/70)  Lipid Panel: Date/Time: 05-31-23 @ 09:26  Cholesterol, Serum: 128  Direct LDL: --  HDL Cholesterol, Serum: 31  Total Cholesterol/HDL Ration Measurement: --  Triglycerides, Serum: 157   BMI: BMI (kg/m2): 41.3 (06-24-23 @ 00:30)  HbA1c: A1C with Estimated Average Glucose Result: 6.7 % (06-24-23 @ 06:10)    Glucose: POCT Blood Glucose.: 106 mg/dL (06-24-23 @ 09:14)    BP: 98/70 (06-22-23 @ 19:29) (98/70 - 111/88)  Lipid Panel: Date/Time: 06-24-23 @ 06:10  Cholesterol, Serum: 118  Direct LDL: --  HDL Cholesterol, Serum: 25  Total Cholesterol/HDL Ration Measurement: --  Triglycerides, Serum: 155

## 2023-06-21 NOTE — BH INPATIENT PSYCHIATRY PROGRESS NOTE - PRN MEDS
Post-Care Instructions: Patient instructed to apply ice to reduce swelling. MEDICATIONS  (PRN):  albuterol    90 MICROgram(s) HFA Inhaler 2 Puff(s) Inhalation every 6 hours PRN wheezing  aluminum hydroxide/magnesium hydroxide/simethicone Suspension 30 milliLiter(s) Oral every 4 hours PRN Dyspepsia  chlorproMAZINE    Injectable 50 milliGRAM(s) IntraMuscular once PRN severe aggression  chlorproMAZINE    Tablet 50 milliGRAM(s) Oral every 6 hours PRN aggression  dextrose Oral Gel 15 Gram(s) Oral once PRN Blood Glucose LESS THAN 70 milliGRAM(s)/deciliter  LORazepam     Tablet 2 milliGRAM(s) Oral every 6 hours PRN Aggression  LORazepam   Injectable 3 milliGRAM(s) IntraMuscular once PRN severe aggression   MEDICATIONS  (PRN):

## 2023-06-21 NOTE — BH INPATIENT PSYCHIATRY PROGRESS NOTE - NSBHASSESSSUMMFT_PSY_ALL_CORE
33 year old single male, domiciled at [facility]. Patient has past psychiatric history of moderate ID, ASD, impulse control disorder, factitious disorder, HAVEN, mood disorders, PTSD and ADHD.  Patient has extensive history of inpatient hospitalization and ED visits. Most recently patient was discharged from [facility] on 6/6/23.  Patient is re-hospitalized with a primary problem self inflicting laceration to abdomen, CAH to hurt self and others.  Patient admitted to [facility] on a 9.27 legal status.   Patient requires inpatient hospitalization due to symptoms of mental illness so severe that they significantly interfere with activities of daily living, and presents a potential danger to self as a result of acute decompensation with s/p  SA,  he is requiring 24-hour care at this time as a result, for psychiatric stabilization and safety.     Plan:  >Legal: 9.27  >Obs: Continue CO for SIB  >Psychiatric Meds:   -Depakote 1000mg qhs and 500mg daily,   - Intuniv ER 4mg,   -Risperdal 4mg BID,   -Remeron 30mg qhs.   -add klonopin 0.5mg TID  Ativan 2mg oral Q6HR PRN for agitation and anxiety.  Haldol 5mg oral Q6HR PRN for agitation.   Benadryl 50mg oral Q6HR PRN for agitation.   >Labs:  labs reviewed, no acute findings. U-tox negative, VPA level 61.40. Xray to left shoulder and clavicle resulted unremarkable.  Hold antipsychotics if QTc >500  >Medical: S/P abdomen lacerations.  Medicine and wound care consultations needed.  Patient with consistently stable VS.  During the course of treatment, will collaborate with medical team to manage medical issues.  >Diet: Safety tray  >Consult: Wound consult and PT  >Social: milieu/structured therapy  >Treatment Interventions: Groups and Individual Therapy/CBT, Motivational counseling for substance abuse related issues.   >Dispo: Collateral and dispo planning pending further symptom and medication optimization       33 year old single male, domiciled at [facility]. Patient has past psychiatric history of moderate ID, ASD, impulse control disorder, factitious disorder, HAVEN, mood disorders, PTSD and ADHD.  Patient has extensive history of inpatient hospitalization and ED visits. Most recently patient was discharged from [facility] on 6/6/23.  Patient is re-hospitalized with a primary problem self inflicting laceration to abdomen, CAH to hurt self and others.  Patient admitted to [facility] on a 9.27 legal status.   Patient requires inpatient hospitalization due to symptoms of mental illness so severe that they significantly interfere with activities of daily living, and presents a potential danger to self as a result of acute decompensation with s/p  SA,  he is requiring 24-hour care at this time as a result, for psychiatric stabilization and safety.     Plan:  >Legal: 9.27  >Obs: Continue CO for SIB  >Psychiatric Meds:   -Depakote 1500mg qhs   - Intuniv ER 4mg,   -Risperdal 4mg BID,   -Remeron 30mg qhs.   -add klonopin 1mg TID  -increase Gabapentin 400mgTID  Ativan 2mg oral Q6HR PRN for agitation and anxiety.  Haldol 5mg oral Q6HR PRN for agitation.   Benadryl 50mg oral Q6HR PRN for agitation.   >Labs:  labs reviewed, no acute findings. U-tox negative, VPA level 61.40. Xray to left shoulder and clavicle resulted unremarkable.  Hold antipsychotics if QTc >500  >Medical: S/P abdomen lacerations.  Medicine and wound care consultations needed.  Patient with consistently stable VS.  During the course of treatment, will collaborate with medical team to manage medical issues.  >Diet: Safety tray  >Consult: Wound consult and PT  >Social: milieu/structured therapy  >Treatment Interventions: Groups and Individual Therapy/CBT, Motivational counseling for substance abuse related issues.   >Dispo: Collateral and dispo planning pending further symptom and medication optimization

## 2023-06-21 NOTE — BH INPATIENT PSYCHIATRY PROGRESS NOTE - CURRENT MEDICATION
MEDICATIONS  (STANDING):  atorvastatin 10 milliGRAM(s) Oral at bedtime  bacitracin   Ointment 1 Application(s) Topical two times a day  clonazePAM  Tablet 1 milliGRAM(s) Oral three times a day  divalproex ER 1500 milliGRAM(s) Oral at bedtime  ferrous    sulfate 325 milliGRAM(s) Oral at bedtime  gabapentin 400 milliGRAM(s) Oral three times a day  glucagon  Injectable 1 milliGRAM(s) IntraMuscular once  guanFACINE ER 4 milliGRAM(s) Oral at bedtime  insulin lispro (ADMELOG) corrective regimen sliding scale   SubCutaneous three times a day before meals  levothyroxine 50 MICROGram(s) Oral daily  metFORMIN 1000 milliGRAM(s) Oral daily  risperiDONE   Tablet 4 milliGRAM(s) Oral two times a day  tamsulosin 0.4 milliGRAM(s) Oral at bedtime    MEDICATIONS  (PRN):  albuterol    90 MICROgram(s) HFA Inhaler 2 Puff(s) Inhalation every 6 hours PRN wheezing  aluminum hydroxide/magnesium hydroxide/simethicone Suspension 30 milliLiter(s) Oral every 4 hours PRN Dyspepsia  chlorproMAZINE    Injectable 50 milliGRAM(s) IntraMuscular once PRN severe aggression  chlorproMAZINE    Tablet 50 milliGRAM(s) Oral every 6 hours PRN aggression  dextrose Oral Gel 15 Gram(s) Oral once PRN Blood Glucose LESS THAN 70 milliGRAM(s)/deciliter  LORazepam     Tablet 2 milliGRAM(s) Oral every 6 hours PRN Aggression  LORazepam   Injectable 3 milliGRAM(s) IntraMuscular once PRN severe aggression   MEDICATIONS  (STANDING):    MEDICATIONS  (PRN):

## 2023-06-22 ENCOUNTER — INPATIENT (INPATIENT)
Facility: HOSPITAL | Age: 33
LOS: 5 days | Discharge: ANOTHER HOSPITAL | End: 2023-06-28
Attending: INTERNAL MEDICINE | Admitting: INTERNAL MEDICINE
Payer: MEDICAID

## 2023-06-22 VITALS
DIASTOLIC BLOOD PRESSURE: 74 MMHG | OXYGEN SATURATION: 96 % | RESPIRATION RATE: 20 BRPM | SYSTOLIC BLOOD PRESSURE: 117 MMHG | HEART RATE: 96 BPM | TEMPERATURE: 98 F | HEIGHT: 71 IN

## 2023-06-22 VITALS
SYSTOLIC BLOOD PRESSURE: 98 MMHG | OXYGEN SATURATION: 94 % | DIASTOLIC BLOOD PRESSURE: 70 MMHG | RESPIRATION RATE: 16 BRPM | TEMPERATURE: 99 F | HEART RATE: 87 BPM

## 2023-06-22 LAB
AMMONIA BLD-MCNC: 39 UMOL/L — SIGNIFICANT CHANGE UP (ref 11–55)
GLUCOSE BLDC GLUCOMTR-MCNC: 122 MG/DL — HIGH (ref 70–99)
GLUCOSE BLDC GLUCOMTR-MCNC: 127 MG/DL — HIGH (ref 70–99)
GLUCOSE BLDC GLUCOMTR-MCNC: 138 MG/DL — HIGH (ref 70–99)
GLUCOSE BLDC GLUCOMTR-MCNC: 197 MG/DL — HIGH (ref 70–99)
SARS-COV-2 RNA SPEC QL NAA+PROBE: SIGNIFICANT CHANGE UP
VALPROATE SERPL-MCNC: 89 UG/ML — SIGNIFICANT CHANGE UP (ref 50–100)

## 2023-06-22 PROCEDURE — 99285 EMERGENCY DEPT VISIT HI MDM: CPT

## 2023-06-22 PROCEDURE — 99232 SBSQ HOSP IP/OBS MODERATE 35: CPT

## 2023-06-22 RX ORDER — CLONAZEPAM 1 MG
1 TABLET ORAL DAILY
Refills: 0 | Status: DISCONTINUED | OUTPATIENT
Start: 2023-06-22 | End: 2023-06-22

## 2023-06-22 RX ORDER — MIRTAZAPINE 45 MG/1
15 TABLET, ORALLY DISINTEGRATING ORAL AT BEDTIME
Refills: 0 | Status: DISCONTINUED | OUTPATIENT
Start: 2023-06-22 | End: 2023-06-23

## 2023-06-22 RX ADMIN — Medication 50 MICROGRAM(S): at 08:05

## 2023-06-22 RX ADMIN — Medication 1 MILLIGRAM(S): at 08:04

## 2023-06-22 RX ADMIN — Medication 2: at 16:55

## 2023-06-22 RX ADMIN — RISPERIDONE 4 MILLIGRAM(S): 4 TABLET ORAL at 08:04

## 2023-06-22 RX ADMIN — Medication 1 APPLICATION(S): at 08:12

## 2023-06-22 RX ADMIN — METFORMIN HYDROCHLORIDE 1000 MILLIGRAM(S): 850 TABLET ORAL at 08:04

## 2023-06-22 RX ADMIN — GABAPENTIN 400 MILLIGRAM(S): 400 CAPSULE ORAL at 08:05

## 2023-06-22 NOTE — CHART NOTE - NSCHARTNOTEFT_GEN_A_CORE
[facility] Inpatient to ED Transfer Summary    Reason for Transfer/Medical Summary:  33M with a change in mental status noted earlier in the day, onset unknown, Pt sitting in a chair minimally arousal able to tactile stimulation. Unable to obtain ROS due to pt lethargy.  Vital T = 99.1* oral, BP= 98/ 70. HR = 87b/ mi, RR= 16b/ min, FS= 135.       PAST MEDICAL & SURGICAL HISTORY:  Mood disorder      Intellectual disability      Asthma      Obesity      GERD (gastroesophageal reflux disease)      Asthma      GERD (gastroesophageal reflux disease)      Factitious disorder      Urinary retention      Enuresis      Myopia of both eyes      Autism      Hypothyroidism      Hypothyroid      History of BPH      Dyslipidemia      HLD (hyperlipidemia)      Impulse control disorder      No significant past surgical history      No significant past surgical history          Allergies    Bee stings (Unknown)  Zyprexa (Unknown)  Zyprexa (Dystonic RXN)  Haldol (Other)  Haldol (Rash)    Intolerances        MEDICATIONS  (STANDING):  atorvastatin 10 milliGRAM(s) Oral at bedtime  bacitracin   Ointment 1 Application(s) Topical two times a day  divalproex ER 1500 milliGRAM(s) Oral at bedtime  ferrous    sulfate 325 milliGRAM(s) Oral at bedtime  gabapentin 400 milliGRAM(s) Oral three times a day  glucagon  Injectable 1 milliGRAM(s) IntraMuscular once  guanFACINE ER 4 milliGRAM(s) Oral at bedtime  insulin lispro (ADMELOG) corrective regimen sliding scale   SubCutaneous three times a day before meals  levothyroxine 50 MICROGram(s) Oral daily  metFORMIN 1000 milliGRAM(s) Oral daily  mirtazapine 15 milliGRAM(s) Oral at bedtime  risperiDONE   Tablet 4 milliGRAM(s) Oral two times a day  tamsulosin 0.4 milliGRAM(s) Oral at bedtime    MEDICATIONS  (PRN):  albuterol    90 MICROgram(s) HFA Inhaler 2 Puff(s) Inhalation every 6 hours PRN wheezing  aluminum hydroxide/magnesium hydroxide/simethicone Suspension 30 milliLiter(s) Oral every 4 hours PRN Dyspepsia  chlorproMAZINE    Injectable 50 milliGRAM(s) IntraMuscular once PRN severe aggression  chlorproMAZINE    Tablet 50 milliGRAM(s) Oral every 6 hours PRN aggression  dextrose Oral Gel 15 Gram(s) Oral once PRN Blood Glucose LESS THAN 70 milliGRAM(s)/deciliter  LORazepam     Tablet 2 milliGRAM(s) Oral every 6 hours PRN Aggression  LORazepam   Injectable 3 milliGRAM(s) IntraMuscular once PRN severe aggression      Vital Signs Last 24 Hrs  T(C): 37.3 (22 Jun 2023 19:29), Max: 37.3 (22 Jun 2023 19:29)  T(F): 99.1 (22 Jun 2023 19:29), Max: 99.1 (22 Jun 2023 19:29)  HR: 87 (22 Jun 2023 19:29) (87 - 87)  BP: 98/70 (22 Jun 2023 19:29) (98/70 - 98/70)  BP(mean): --  RR: 16 (22 Jun 2023 19:29) (16 - 16)  SpO2: 94% (22 Jun 2023 19:29) (94% - 94%)      CAPILLARY BLOOD GLUCOSE      POCT Blood Glucose.: 197 mg/dL (22 Jun 2023 16:09)  POCT Blood Glucose.: 138 mg/dL (22 Jun 2023 11:40)  POCT Blood Glucose.: 127 mg/dL (22 Jun 2023 07:26)  POCT Blood Glucose.: 242 mg/dL (21 Jun 2023 20:11)        PHYSICAL EXAM:  GENERAL: Lethargic   HEAD:  Atraumatic, Normocephalic  EYES: EOMI,  conjunctiva and sclera clear  NECK: Supple, No JVD  CHEST/LUNG: Clear to auscultation bilaterally; No wheeze  HEART: Regular rate and rhythm; No murmurs, rubs, or gallops  ABDOMEN: Positive BS, Distended due to habitus, Soft, NT.   EXTREMITIES:  2+ Peripheral Pulses, No clubbing, cyanosis, or edema  PSYCH: Lethargic   NEUROLOGY: Minimally arousable. Non-focal  Skin: pale.       LABS:      A/P   33M admitted to [facility] for Severe episode of recurrent major depressive disorder,  PMHx of Asthma, DM, Autism, Bi polar, ADHD with a change in mental status, lethargy and minimal responsive to tactile stimulation with sternal rub. Pt unable to answerer ROS. P.E. no focal deficits. Told by staff pt has received any sedative mediations, VSS. Change in mental status of unclear etiology, being transfer to the ED for further evaluation and possible CT head. CAROL called and aware ED, A.CHRIS PATINO &  ED Dr Chaparro called and made aware of incoming pt, Emergency contact Mrs Reed Bazan @ [phone] called and made aware of the pt's transfer, she asks to be notified when results are obtained.                   Psychiatry Section:  Psychiatric Summary/[facility] admitting diagnosis:    Psychiatric Recommendations:    Observation status (check one):   (X) Constant Observation  ( ) Enhanced care  ( ) Routine checks    Risk Status (check all that apply if present):  ( ) at risk for suicide/self-injury  ( ) at risk for aggressive behavior  (X) at risk for elopement  ( ) other risk:

## 2023-06-22 NOTE — BH INPATIENT PSYCHIATRY PROGRESS NOTE - ATTENDING COMMENTS
"I independently performed the documented Medical Decision Making" is selected to indicate that I discussed the patient with the treatment team and reviewed and revised the medications and treatment plan with the MASHA based on the history and exam performed by MASHA.
"I independently performed the documented medical decision making" indicates that I discussed with the treatment team and reviewed and revised treatment plan including medications with MASHA based on history and exam performed by MASHA.

## 2023-06-22 NOTE — BH INPATIENT PSYCHIATRY PROGRESS NOTE - PRN MEDS
MEDICATIONS  (PRN):  albuterol    90 MICROgram(s) HFA Inhaler 2 Puff(s) Inhalation every 6 hours PRN wheezing  aluminum hydroxide/magnesium hydroxide/simethicone Suspension 30 milliLiter(s) Oral every 4 hours PRN Dyspepsia  chlorproMAZINE    Injectable 50 milliGRAM(s) IntraMuscular once PRN severe aggression  chlorproMAZINE    Tablet 50 milliGRAM(s) Oral every 6 hours PRN aggression  dextrose Oral Gel 15 Gram(s) Oral once PRN Blood Glucose LESS THAN 70 milliGRAM(s)/deciliter  LORazepam     Tablet 2 milliGRAM(s) Oral every 6 hours PRN Aggression  LORazepam   Injectable 3 milliGRAM(s) IntraMuscular once PRN severe aggression   MEDICATIONS  (PRN):

## 2023-06-22 NOTE — BH INPATIENT PSYCHIATRY PROGRESS NOTE - NSBHFUPINTERVALHXFT_PSY_A_CORE
Patient is followed up for MDD and depressed mood, s/p suicide attempt lacerations to the abdomen and increasing aggression/violent behaviors. Patient is discussed in treatment  team.  Discussed with SW regarding dischargingc patient back to residence.  Per SW, residence was contacted yesterday, per residence they are requesting a dischargec meeting, pts mother wants to be present.  Meeting is scheduled for Monday 6/26  Per nursing no interval events. Pt slept through out the night, no prn's last night, no behavioral issues.   Patient is  observed in his room, accompanied with CO.  Discussed discontinuing CO. Pt reports he does not have any desire to harm self, or others. Pt reports feeling calmer.  No overt psychotic trend. No acute medical issues. No reports of pain, no drainage, or redness.  POCT:127  VSS. Continue to monitor and provide therapeutic support.

## 2023-06-22 NOTE — ED PROVIDER NOTE - NSICDXPASTSURGICALHX_GEN_ALL_CORE_FT
PAST SURGICAL HISTORY:  No significant past surgical history     No significant past surgical history     
Attending

## 2023-06-22 NOTE — BH INPATIENT PSYCHIATRY PROGRESS NOTE - NSBHCHARTREVIEWVS_PSY_A_CORE FT
Vital Signs Last 24 Hrs  T(C): 37 (06-21-23 @ 19:50), Max: 37 (06-21-23 @ 19:50)  T(F): 98.6 (06-21-23 @ 19:50), Max: 98.6 (06-21-23 @ 19:50)  HR: 89 (06-21-23 @ 18:34) (89 - 89)  BP: 111/88 (06-21-23 @ 18:34) (111/88 - 111/88)  BP(mean): --  RR: 17 (06-21-23 @ 19:50) (17 - 17)  SpO2: 96% (06-21-23 @ 18:03) (96% - 96%)    Orthostatic VS  06-21-23 @ 19:50  Lying BP: 117/70 HR: 76  Sitting BP: 102/81 HR: 92  Standing BP: --/-- HR: --  Site: --  Mode: --  Orthostatic VS  06-21-23 @ 19:00  Lying BP: --/-- HR: --  Sitting BP: 111/88 HR: 89  Standing BP: --/-- HR: --  Site: --  Mode: --  Orthostatic VS  06-20-23 @ 08:52  Lying BP: --/-- HR: --  Sitting BP: 114/79 HR: 77  Standing BP: --/-- HR: --  Site: --  Mode: --   Vital Signs Last 24 Hrs  T(C): 36.8 (06-22-23 @ 08:58), Max: 37 (06-21-23 @ 19:50)  T(F): 98.2 (06-22-23 @ 08:58), Max: 98.6 (06-21-23 @ 19:50)  HR: 89 (06-21-23 @ 18:34) (89 - 89)  BP: 111/88 (06-21-23 @ 18:34) (111/88 - 111/88)  BP(mean): --  RR: 17 (06-21-23 @ 19:50) (17 - 17)  SpO2: 96% (06-21-23 @ 18:03) (96% - 96%)    Orthostatic VS  06-21-23 @ 19:50  Lying BP: 117/70 HR: 76  Sitting BP: 102/81 HR: 92  Standing BP: --/-- HR: --  Site: --  Mode: --  Orthostatic VS  06-21-23 @ 19:00  Lying BP: --/-- HR: --  Sitting BP: 111/88 HR: 89  Standing BP: --/-- HR: --  Site: --  Mode: --   Vital Signs Last 24 Hrs  T(C): 36.4 (06-24-23 @ 06:20), Max: 36.9 (06-23-23 @ 12:00)  T(F): 97.5 (06-24-23 @ 06:20), Max: 98.4 (06-23-23 @ 12:00)  HR: 78 (06-24-23 @ 06:20) (62 - 88)  BP: 107/68 (06-24-23 @ 06:20) (96/72 - 142/90)  BP(mean): --  RR: 19 (06-24-23 @ 06:20) (17 - 20)  SpO2: 97% (06-24-23 @ 06:20) (97% - 100%)

## 2023-06-22 NOTE — PHARMACOTHERAPY INTERVENTION NOTE - COMMENTS
Concurrent use of LEVOTHYROXINE, ANTACIDS (e.g., magnesium and aluminum), and SIMETHICONE may result in decreased levothyroxine effectiveness.    Added administration instruction to PRN order for aluminum hydroxide/magnesium hydroxide/simethicone suspension to "Do not administer within 4 hours of levothyroxine."    Christiana Rosa PharmD, BCPP

## 2023-06-22 NOTE — ED ADULT TRIAGE NOTE - CHIEF COMPLAINT QUOTE
Patient brought in by EMS from [facility] for lethargy. Per EMS, staff noticed pt. more lethargic since yesterday. Pt. endorses CP and SOB. Placed on 2L NC by EMS, 96%. PMH HLD, DM, autism, schizophrenia. Patient brought in by EMS from [facility] for lethargy. Per EMS, staff noticed pt. more lethargic since yesterday. Pt. endorses CP and SOB. Placed on 2L NC by EMS, 96%. PMH HLD, DM, autism, schizophrenia.    lauren shaw ([facility]) [phone]

## 2023-06-22 NOTE — BH INPATIENT PSYCHIATRY PROGRESS NOTE - NSBHASSESSSUMMFT_PSY_ALL_CORE
33 year old single male, domiciled at [facility]. Patient has past psychiatric history of moderate ID, ASD, impulse control disorder, factitious disorder, HAVEN, mood disorders, PTSD and ADHD.  Patient has extensive history of inpatient hospitalization and ED visits. Most recently patient was discharged from [facility] on 6/6/23.  Patient is re-hospitalized with a primary problem self inflicting laceration to abdomen, CAH to hurt self and others.  Patient admitted to [facility] on a 9.27 legal status.   Patient requires inpatient hospitalization due to symptoms of mental illness so severe that they significantly interfere with activities of daily living, and presents a potential danger to self as a result of acute decompensation with s/p  SA,  he is requiring 24-hour care at this time as a result, for psychiatric stabilization and safety.     Plan:  >Legal: 9.27  >Obs: Continue CO for SIB  >Psychiatric Meds:   -Depakote 1500mg qhs   - Intuniv ER 4mg,   -Risperdal 4mg BID,   -Remeron 30mg qhs.   -begin to taper  klonopin 1mg BID  -increase Gabapentin 400mgTID  Ativan 2mg oral Q6HR PRN for agitation and anxiety.  Haldol 5mg oral Q6HR PRN for agitation.   Benadryl 50mg oral Q6HR PRN for agitation.   >Labs:  labs reviewed, no acute findings. U-tox negative, VPA level 61.40. Xray to left shoulder and clavicle resulted unremarkable.  Hold antipsychotics if QTc >500  >Medical: S/P abdomen lacerations.  Medicine and wound care consultations needed.  Patient with consistently stable VS.  During the course of treatment, will collaborate with medical team to manage medical issues.  >Diet: Safety tray  >Consult: Wound consult and PT  >Social: milieu/structured therapy  >Treatment Interventions: Groups and Individual Therapy/CBT, Motivational counseling for substance abuse related issues.   >Dispo: Collateral and dispo planning pending further symptom and medication optimization       33 year old single male, domiciled at [facility]. Patient has past psychiatric history of moderate ID, ASD, impulse control disorder, factitious disorder, HAVEN, mood disorders, PTSD and ADHD.  Patient has extensive history of inpatient hospitalization and ED visits. Most recently patient was discharged from [facility] on 6/6/23.  Patient is re-hospitalized with a primary problem self inflicting laceration to abdomen, CAH to hurt self and others.  Patient admitted to [facility] on a 9.27 legal status.   Patient requires inpatient hospitalization due to symptoms of mental illness so severe that they significantly interfere with activities of daily living, and presents a potential danger to self as a result of acute decompensation with s/p  SA,  he is requiring 24-hour care at this time as a result, for psychiatric stabilization and safety.     Plan:  >Legal: 9.27  >Obs: Continue CO for SIB  >Psychiatric Meds:   -Depakote 1500mg qhs   - Intuniv ER 4mg,   -Risperdal 4mg BID,   -Remeron 30mg qhs.   -begin to taper  klonopin 1mg qd from TID due to over sedation  -increase Gabapentin 400mgTID  Ativan 2mg oral Q6HR PRN for agitation and anxiety.  Haldol 5mg oral Q6HR PRN for agitation.   Benadryl 50mg oral Q6HR PRN for agitation.   >Labs:  labs reviewed, no acute findings. U-tox negative, VPA level 61.40. Xray to left shoulder and clavicle resulted unremarkable.  Hold antipsychotics if QTc >500  >Medical: S/P abdomen lacerations.  Medicine and wound care consultations needed.  Patient with consistently stable VS.  During the course of treatment, will collaborate with medical team to manage medical issues.  >Diet: Safety tray  >Consult: Wound consult and PT  >Social: milieu/structured therapy  >Treatment Interventions: Groups and Individual Therapy/CBT, Motivational counseling for substance abuse related issues.   >Dispo: Collateral and dispo planning pending further symptom and medication optimization

## 2023-06-22 NOTE — BH INPATIENT PSYCHIATRY PROGRESS NOTE - CURRENT MEDICATION
MEDICATIONS  (STANDING):  atorvastatin 10 milliGRAM(s) Oral at bedtime  bacitracin   Ointment 1 Application(s) Topical two times a day  divalproex ER 1500 milliGRAM(s) Oral at bedtime  ferrous    sulfate 325 milliGRAM(s) Oral at bedtime  gabapentin 400 milliGRAM(s) Oral three times a day  glucagon  Injectable 1 milliGRAM(s) IntraMuscular once  guanFACINE ER 4 milliGRAM(s) Oral at bedtime  insulin lispro (ADMELOG) corrective regimen sliding scale   SubCutaneous three times a day before meals  levothyroxine 50 MICROGram(s) Oral daily  metFORMIN 1000 milliGRAM(s) Oral daily  risperiDONE   Tablet 4 milliGRAM(s) Oral two times a day  tamsulosin 0.4 milliGRAM(s) Oral at bedtime    MEDICATIONS  (PRN):  albuterol    90 MICROgram(s) HFA Inhaler 2 Puff(s) Inhalation every 6 hours PRN wheezing  aluminum hydroxide/magnesium hydroxide/simethicone Suspension 30 milliLiter(s) Oral every 4 hours PRN Dyspepsia  chlorproMAZINE    Injectable 50 milliGRAM(s) IntraMuscular once PRN severe aggression  chlorproMAZINE    Tablet 50 milliGRAM(s) Oral every 6 hours PRN aggression  dextrose Oral Gel 15 Gram(s) Oral once PRN Blood Glucose LESS THAN 70 milliGRAM(s)/deciliter  LORazepam     Tablet 2 milliGRAM(s) Oral every 6 hours PRN Aggression  LORazepam   Injectable 3 milliGRAM(s) IntraMuscular once PRN severe aggression   MEDICATIONS  (STANDING):  atorvastatin 10 milliGRAM(s) Oral at bedtime  bacitracin   Ointment 1 Application(s) Topical two times a day  clonazePAM  Tablet 1 milliGRAM(s) Oral daily  divalproex ER 1500 milliGRAM(s) Oral at bedtime  ferrous    sulfate 325 milliGRAM(s) Oral at bedtime  gabapentin 400 milliGRAM(s) Oral three times a day  glucagon  Injectable 1 milliGRAM(s) IntraMuscular once  guanFACINE ER 4 milliGRAM(s) Oral at bedtime  insulin lispro (ADMELOG) corrective regimen sliding scale   SubCutaneous three times a day before meals  levothyroxine 50 MICROGram(s) Oral daily  metFORMIN 1000 milliGRAM(s) Oral daily  risperiDONE   Tablet 4 milliGRAM(s) Oral two times a day  tamsulosin 0.4 milliGRAM(s) Oral at bedtime    MEDICATIONS  (PRN):  albuterol    90 MICROgram(s) HFA Inhaler 2 Puff(s) Inhalation every 6 hours PRN wheezing  aluminum hydroxide/magnesium hydroxide/simethicone Suspension 30 milliLiter(s) Oral every 4 hours PRN Dyspepsia  chlorproMAZINE    Injectable 50 milliGRAM(s) IntraMuscular once PRN severe aggression  chlorproMAZINE    Tablet 50 milliGRAM(s) Oral every 6 hours PRN aggression  dextrose Oral Gel 15 Gram(s) Oral once PRN Blood Glucose LESS THAN 70 milliGRAM(s)/deciliter  LORazepam     Tablet 2 milliGRAM(s) Oral every 6 hours PRN Aggression  LORazepam   Injectable 3 milliGRAM(s) IntraMuscular once PRN severe aggression   MEDICATIONS  (STANDING):    MEDICATIONS  (PRN):

## 2023-06-23 DIAGNOSIS — N39.0 URINARY TRACT INFECTION, SITE NOT SPECIFIED: ICD-10-CM

## 2023-06-23 DIAGNOSIS — E11.9 TYPE 2 DIABETES MELLITUS WITHOUT COMPLICATIONS: ICD-10-CM

## 2023-06-23 DIAGNOSIS — Z29.9 ENCOUNTER FOR PROPHYLACTIC MEASURES, UNSPECIFIED: ICD-10-CM

## 2023-06-23 DIAGNOSIS — F32.9 MAJOR DEPRESSIVE DISORDER, SINGLE EPISODE, UNSPECIFIED: ICD-10-CM

## 2023-06-23 DIAGNOSIS — J18.9 PNEUMONIA, UNSPECIFIED ORGANISM: ICD-10-CM

## 2023-06-23 LAB
ALBUMIN SERPL ELPH-MCNC: 3.8 G/DL — SIGNIFICANT CHANGE UP (ref 3.3–5)
ALP SERPL-CCNC: 92 U/L — SIGNIFICANT CHANGE UP (ref 40–120)
ALT FLD-CCNC: 56 U/L — HIGH (ref 4–41)
ANION GAP SERPL CALC-SCNC: 11 MMOL/L — SIGNIFICANT CHANGE UP (ref 7–14)
APAP SERPL-MCNC: <10 UG/ML — LOW (ref 15–25)
APPEARANCE UR: CLEAR — SIGNIFICANT CHANGE UP
AST SERPL-CCNC: 21 U/L — SIGNIFICANT CHANGE UP (ref 4–40)
BASE EXCESS BLDV CALC-SCNC: 2.7 MMOL/L — SIGNIFICANT CHANGE UP (ref -2–3)
BASOPHILS # BLD AUTO: 0.05 K/UL — SIGNIFICANT CHANGE UP (ref 0–0.2)
BASOPHILS NFR BLD AUTO: 0.4 % — SIGNIFICANT CHANGE UP (ref 0–2)
BILIRUB SERPL-MCNC: 0.4 MG/DL — SIGNIFICANT CHANGE UP (ref 0.2–1.2)
BILIRUB UR-MCNC: NEGATIVE — SIGNIFICANT CHANGE UP
BLD GP AB SCN SERPL QL: NEGATIVE — SIGNIFICANT CHANGE UP
BLOOD GAS VENOUS COMPREHENSIVE RESULT: SIGNIFICANT CHANGE UP
BUN SERPL-MCNC: 14 MG/DL — SIGNIFICANT CHANGE UP (ref 7–23)
CALCIUM SERPL-MCNC: 9 MG/DL — SIGNIFICANT CHANGE UP (ref 8.4–10.5)
CHLORIDE BLDV-SCNC: 98 MMOL/L — SIGNIFICANT CHANGE UP (ref 96–108)
CHLORIDE SERPL-SCNC: 96 MMOL/L — LOW (ref 98–107)
CO2 BLDV-SCNC: 31.8 MMOL/L — HIGH (ref 22–26)
CO2 SERPL-SCNC: 27 MMOL/L — SIGNIFICANT CHANGE UP (ref 22–31)
COLOR SPEC: SIGNIFICANT CHANGE UP
CREAT SERPL-MCNC: 1.06 MG/DL — SIGNIFICANT CHANGE UP (ref 0.5–1.3)
DIFF PNL FLD: NEGATIVE — SIGNIFICANT CHANGE UP
EGFR: 94 ML/MIN/1.73M2 — SIGNIFICANT CHANGE UP
EOSINOPHIL # BLD AUTO: 0.18 K/UL — SIGNIFICANT CHANGE UP (ref 0–0.5)
EOSINOPHIL NFR BLD AUTO: 1.4 % — SIGNIFICANT CHANGE UP (ref 0–6)
ERYTHROCYTE [SEDIMENTATION RATE] IN BLOOD: 17 MM/HR — HIGH (ref 1–15)
ETHANOL SERPL-MCNC: <10 MG/DL — SIGNIFICANT CHANGE UP
ETHANOL SERPL-MCNC: <10 MG/DL — SIGNIFICANT CHANGE UP
FLUAV AG NPH QL: SIGNIFICANT CHANGE UP
FLUBV AG NPH QL: SIGNIFICANT CHANGE UP
GAS PNL BLDV: 130 MMOL/L — LOW (ref 136–145)
GLUCOSE BLDC GLUCOMTR-MCNC: 146 MG/DL — HIGH (ref 70–99)
GLUCOSE BLDV-MCNC: 135 MG/DL — HIGH (ref 70–99)
GLUCOSE SERPL-MCNC: 133 MG/DL — HIGH (ref 70–99)
GLUCOSE UR QL: NEGATIVE — SIGNIFICANT CHANGE UP
HCO3 BLDV-SCNC: 30 MMOL/L — HIGH (ref 22–29)
HCT VFR BLD CALC: 43.6 % — SIGNIFICANT CHANGE UP (ref 39–50)
HCT VFR BLDA CALC: 43 % — SIGNIFICANT CHANGE UP (ref 39–51)
HGB BLD CALC-MCNC: 14.2 G/DL — SIGNIFICANT CHANGE UP (ref 12.6–17.4)
HGB BLD-MCNC: 13.7 G/DL — SIGNIFICANT CHANGE UP (ref 13–17)
IANC: 8.99 K/UL — HIGH (ref 1.8–7.4)
IMM GRANULOCYTES NFR BLD AUTO: 0.6 % — SIGNIFICANT CHANGE UP (ref 0–0.9)
KETONES UR-MCNC: NEGATIVE — SIGNIFICANT CHANGE UP
LACTATE BLDV-MCNC: 1.5 MMOL/L — SIGNIFICANT CHANGE UP (ref 0.5–2)
LEGIONELLA AG UR QL: NEGATIVE — SIGNIFICANT CHANGE UP
LEUKOCYTE ESTERASE UR-ACNC: NEGATIVE — SIGNIFICANT CHANGE UP
LIDOCAIN IGE QN: 13 U/L — SIGNIFICANT CHANGE UP (ref 7–60)
LYMPHOCYTES # BLD AUTO: 16.2 % — SIGNIFICANT CHANGE UP (ref 13–44)
LYMPHOCYTES # BLD AUTO: 2.05 K/UL — SIGNIFICANT CHANGE UP (ref 1–3.3)
MAGNESIUM SERPL-MCNC: 1.9 MG/DL — SIGNIFICANT CHANGE UP (ref 1.6–2.6)
MCHC RBC-ENTMCNC: 25.7 PG — LOW (ref 27–34)
MCHC RBC-ENTMCNC: 31.4 GM/DL — LOW (ref 32–36)
MCV RBC AUTO: 81.8 FL — SIGNIFICANT CHANGE UP (ref 80–100)
MONOCYTES # BLD AUTO: 1.35 K/UL — HIGH (ref 0–0.9)
MONOCYTES NFR BLD AUTO: 10.6 % — SIGNIFICANT CHANGE UP (ref 2–14)
NEUTROPHILS # BLD AUTO: 8.99 K/UL — HIGH (ref 1.8–7.4)
NEUTROPHILS NFR BLD AUTO: 70.8 % — SIGNIFICANT CHANGE UP (ref 43–77)
NITRITE UR-MCNC: NEGATIVE — SIGNIFICANT CHANGE UP
NRBC # BLD: 0 /100 WBCS — SIGNIFICANT CHANGE UP (ref 0–0)
NRBC # FLD: 0 K/UL — SIGNIFICANT CHANGE UP (ref 0–0)
PCO2 BLDV: 57 MMHG — HIGH (ref 42–55)
PH BLDV: 7.33 — SIGNIFICANT CHANGE UP (ref 7.32–7.43)
PH UR: 7 — SIGNIFICANT CHANGE UP (ref 5–8)
PLATELET # BLD AUTO: 179 K/UL — SIGNIFICANT CHANGE UP (ref 150–400)
PO2 BLDV: 59 MMHG — HIGH (ref 25–45)
POTASSIUM BLDV-SCNC: 5.1 MMOL/L — SIGNIFICANT CHANGE UP (ref 3.5–5.1)
POTASSIUM SERPL-MCNC: 4.9 MMOL/L — SIGNIFICANT CHANGE UP (ref 3.5–5.3)
POTASSIUM SERPL-SCNC: 4.9 MMOL/L — SIGNIFICANT CHANGE UP (ref 3.5–5.3)
PROCALCITONIN SERPL-MCNC: 0.19 NG/ML — HIGH (ref 0.02–0.1)
PROT SERPL-MCNC: 6.4 G/DL — SIGNIFICANT CHANGE UP (ref 6–8.3)
PROT UR-MCNC: NEGATIVE — SIGNIFICANT CHANGE UP
RBC # BLD: 5.33 M/UL — SIGNIFICANT CHANGE UP (ref 4.2–5.8)
RBC # FLD: 13.7 % — SIGNIFICANT CHANGE UP (ref 10.3–14.5)
RH IG SCN BLD-IMP: POSITIVE — SIGNIFICANT CHANGE UP
RSV RNA NPH QL NAA+NON-PROBE: SIGNIFICANT CHANGE UP
SALICYLATES SERPL-MCNC: <0.3 MG/DL — LOW (ref 15–30)
SAO2 % BLDV: 87.3 % — SIGNIFICANT CHANGE UP (ref 67–88)
SARS-COV-2 RNA SPEC QL NAA+PROBE: SIGNIFICANT CHANGE UP
SODIUM SERPL-SCNC: 134 MMOL/L — LOW (ref 135–145)
SP GR SPEC: 1.03 — SIGNIFICANT CHANGE UP (ref 1.01–1.05)
T3 SERPL-MCNC: 84 NG/DL — SIGNIFICANT CHANGE UP (ref 80–200)
T4 AB SER-ACNC: 5.4 UG/DL — SIGNIFICANT CHANGE UP (ref 5.1–13)
T4 FREE SERPL-MCNC: 0.8 NG/DL — LOW (ref 0.9–1.8)
TOXICOLOGY SCREEN, DRUGS OF ABUSE, SERUM RESULT: SIGNIFICANT CHANGE UP
TOXICOLOGY SCREEN, DRUGS OF ABUSE, SERUM RESULT: SIGNIFICANT CHANGE UP
TROPONIN T, HIGH SENSITIVITY RESULT: 9 NG/L — SIGNIFICANT CHANGE UP
TSH SERPL-MCNC: 3.32 UIU/ML — SIGNIFICANT CHANGE UP (ref 0.27–4.2)
UROBILINOGEN FLD QL: SIGNIFICANT CHANGE UP
WBC # BLD: 12.69 K/UL — HIGH (ref 3.8–10.5)
WBC # FLD AUTO: 12.69 K/UL — HIGH (ref 3.8–10.5)

## 2023-06-23 PROCEDURE — 70450 CT HEAD/BRAIN W/O DYE: CPT | Mod: 26,MA

## 2023-06-23 PROCEDURE — 74177 CT ABD & PELVIS W/CONTRAST: CPT | Mod: 26,MA

## 2023-06-23 PROCEDURE — 71045 X-RAY EXAM CHEST 1 VIEW: CPT | Mod: 26

## 2023-06-23 PROCEDURE — 99232 SBSQ HOSP IP/OBS MODERATE 35: CPT

## 2023-06-23 PROCEDURE — 99223 1ST HOSP IP/OBS HIGH 75: CPT

## 2023-06-23 RX ORDER — SODIUM CHLORIDE 9 MG/ML
1000 INJECTION INTRAMUSCULAR; INTRAVENOUS; SUBCUTANEOUS ONCE
Refills: 0 | Status: COMPLETED | OUTPATIENT
Start: 2023-06-23 | End: 2023-06-23

## 2023-06-23 RX ORDER — GLUCAGON INJECTION, SOLUTION 0.5 MG/.1ML
1 INJECTION, SOLUTION SUBCUTANEOUS ONCE
Refills: 0 | Status: DISCONTINUED | OUTPATIENT
Start: 2023-06-23 | End: 2023-06-28

## 2023-06-23 RX ORDER — ONDANSETRON 8 MG/1
4 TABLET, FILM COATED ORAL EVERY 8 HOURS
Refills: 0 | Status: DISCONTINUED | OUTPATIENT
Start: 2023-06-23 | End: 2023-06-28

## 2023-06-23 RX ORDER — ACETAMINOPHEN 500 MG
650 TABLET ORAL EVERY 6 HOURS
Refills: 0 | Status: DISCONTINUED | OUTPATIENT
Start: 2023-06-23 | End: 2023-06-28

## 2023-06-23 RX ORDER — SODIUM CHLORIDE 9 MG/ML
1000 INJECTION, SOLUTION INTRAVENOUS
Refills: 0 | Status: DISCONTINUED | OUTPATIENT
Start: 2023-06-23 | End: 2023-06-28

## 2023-06-23 RX ORDER — CLONAZEPAM 1 MG
1 TABLET ORAL DAILY
Refills: 0 | Status: DISCONTINUED | OUTPATIENT
Start: 2023-06-23 | End: 2023-06-28

## 2023-06-23 RX ORDER — ATORVASTATIN CALCIUM 80 MG/1
10 TABLET, FILM COATED ORAL AT BEDTIME
Refills: 0 | Status: DISCONTINUED | OUTPATIENT
Start: 2023-06-23 | End: 2023-06-28

## 2023-06-23 RX ORDER — ENOXAPARIN SODIUM 100 MG/ML
40 INJECTION SUBCUTANEOUS EVERY 24 HOURS
Refills: 0 | Status: DISCONTINUED | OUTPATIENT
Start: 2023-06-23 | End: 2023-06-23

## 2023-06-23 RX ORDER — CEFEPIME 1 G/1
1000 INJECTION, POWDER, FOR SOLUTION INTRAMUSCULAR; INTRAVENOUS ONCE
Refills: 0 | Status: COMPLETED | OUTPATIENT
Start: 2023-06-23 | End: 2023-06-23

## 2023-06-23 RX ORDER — DIVALPROEX SODIUM 500 MG/1
1500 TABLET, DELAYED RELEASE ORAL AT BEDTIME
Refills: 0 | Status: DISCONTINUED | OUTPATIENT
Start: 2023-06-23 | End: 2023-06-28

## 2023-06-23 RX ORDER — GABAPENTIN 400 MG/1
400 CAPSULE ORAL THREE TIMES A DAY
Refills: 0 | Status: DISCONTINUED | OUTPATIENT
Start: 2023-06-23 | End: 2023-06-28

## 2023-06-23 RX ORDER — SENNA PLUS 8.6 MG/1
2 TABLET ORAL AT BEDTIME
Refills: 0 | Status: DISCONTINUED | OUTPATIENT
Start: 2023-06-23 | End: 2023-06-28

## 2023-06-23 RX ORDER — DEXTROSE 50 % IN WATER 50 %
25 SYRINGE (ML) INTRAVENOUS ONCE
Refills: 0 | Status: DISCONTINUED | OUTPATIENT
Start: 2023-06-23 | End: 2023-06-28

## 2023-06-23 RX ORDER — LORATADINE 10 MG/1
10 TABLET ORAL DAILY
Refills: 0 | Status: DISCONTINUED | OUTPATIENT
Start: 2023-06-23 | End: 2023-06-28

## 2023-06-23 RX ORDER — RISPERIDONE 4 MG/1
4 TABLET ORAL
Refills: 0 | Status: DISCONTINUED | OUTPATIENT
Start: 2023-06-23 | End: 2023-06-28

## 2023-06-23 RX ORDER — CEFTRIAXONE 500 MG/1
1000 INJECTION, POWDER, FOR SOLUTION INTRAMUSCULAR; INTRAVENOUS EVERY 24 HOURS
Refills: 0 | Status: COMPLETED | OUTPATIENT
Start: 2023-06-23 | End: 2023-06-27

## 2023-06-23 RX ORDER — INSULIN LISPRO 100/ML
VIAL (ML) SUBCUTANEOUS
Refills: 0 | Status: DISCONTINUED | OUTPATIENT
Start: 2023-06-23 | End: 2023-06-28

## 2023-06-23 RX ORDER — DEXTROSE 50 % IN WATER 50 %
15 SYRINGE (ML) INTRAVENOUS ONCE
Refills: 0 | Status: DISCONTINUED | OUTPATIENT
Start: 2023-06-23 | End: 2023-06-28

## 2023-06-23 RX ORDER — AZITHROMYCIN 500 MG/1
500 TABLET, FILM COATED ORAL ONCE
Refills: 0 | Status: COMPLETED | OUTPATIENT
Start: 2023-06-23 | End: 2023-06-23

## 2023-06-23 RX ORDER — AZITHROMYCIN 500 MG/1
TABLET, FILM COATED ORAL
Refills: 0 | Status: DISCONTINUED | OUTPATIENT
Start: 2023-06-23 | End: 2023-06-24

## 2023-06-23 RX ORDER — DEXTROSE 50 % IN WATER 50 %
12.5 SYRINGE (ML) INTRAVENOUS ONCE
Refills: 0 | Status: DISCONTINUED | OUTPATIENT
Start: 2023-06-23 | End: 2023-06-28

## 2023-06-23 RX ORDER — INSULIN LISPRO 100/ML
VIAL (ML) SUBCUTANEOUS AT BEDTIME
Refills: 0 | Status: DISCONTINUED | OUTPATIENT
Start: 2023-06-23 | End: 2023-06-23

## 2023-06-23 RX ORDER — SODIUM CHLORIDE 9 MG/ML
1000 INJECTION, SOLUTION INTRAVENOUS ONCE
Refills: 0 | Status: COMPLETED | OUTPATIENT
Start: 2023-06-23 | End: 2023-06-23

## 2023-06-23 RX ORDER — INSULIN LISPRO 100/ML
VIAL (ML) SUBCUTANEOUS
Refills: 0 | Status: DISCONTINUED | OUTPATIENT
Start: 2023-06-23 | End: 2023-06-23

## 2023-06-23 RX ORDER — TAMSULOSIN HYDROCHLORIDE 0.4 MG/1
0.4 CAPSULE ORAL AT BEDTIME
Refills: 0 | Status: DISCONTINUED | OUTPATIENT
Start: 2023-06-23 | End: 2023-06-28

## 2023-06-23 RX ORDER — ENOXAPARIN SODIUM 100 MG/ML
40 INJECTION SUBCUTANEOUS EVERY 12 HOURS
Refills: 0 | Status: DISCONTINUED | OUTPATIENT
Start: 2023-06-23 | End: 2023-06-28

## 2023-06-23 RX ORDER — VANCOMYCIN HCL 1 G
1000 VIAL (EA) INTRAVENOUS ONCE
Refills: 0 | Status: COMPLETED | OUTPATIENT
Start: 2023-06-23 | End: 2023-06-23

## 2023-06-23 RX ORDER — HALOPERIDOL DECANOATE 100 MG/ML
5 INJECTION INTRAMUSCULAR EVERY 6 HOURS
Refills: 0 | Status: DISCONTINUED | OUTPATIENT
Start: 2023-06-23 | End: 2023-06-24

## 2023-06-23 RX ORDER — AZITHROMYCIN 500 MG/1
500 TABLET, FILM COATED ORAL EVERY 24 HOURS
Refills: 0 | Status: DISCONTINUED | OUTPATIENT
Start: 2023-06-24 | End: 2023-06-24

## 2023-06-23 RX ORDER — MIRTAZAPINE 45 MG/1
30 TABLET, ORALLY DISINTEGRATING ORAL AT BEDTIME
Refills: 0 | Status: DISCONTINUED | OUTPATIENT
Start: 2023-06-23 | End: 2023-06-28

## 2023-06-23 RX ORDER — LANOLIN ALCOHOL/MO/W.PET/CERES
3 CREAM (GRAM) TOPICAL AT BEDTIME
Refills: 0 | Status: DISCONTINUED | OUTPATIENT
Start: 2023-06-23 | End: 2023-06-28

## 2023-06-23 RX ORDER — LEVOTHYROXINE SODIUM 125 MCG
50 TABLET ORAL DAILY
Refills: 0 | Status: DISCONTINUED | OUTPATIENT
Start: 2023-06-23 | End: 2023-06-28

## 2023-06-23 RX ORDER — DIPHENHYDRAMINE HCL 50 MG
50 CAPSULE ORAL EVERY 6 HOURS
Refills: 0 | Status: DISCONTINUED | OUTPATIENT
Start: 2023-06-23 | End: 2023-06-24

## 2023-06-23 RX ORDER — PANTOPRAZOLE SODIUM 20 MG/1
40 TABLET, DELAYED RELEASE ORAL
Refills: 0 | Status: DISCONTINUED | OUTPATIENT
Start: 2023-06-23 | End: 2023-06-28

## 2023-06-23 RX ADMIN — MIRTAZAPINE 30 MILLIGRAM(S): 45 TABLET, ORALLY DISINTEGRATING ORAL at 22:13

## 2023-06-23 RX ADMIN — ATORVASTATIN CALCIUM 10 MILLIGRAM(S): 80 TABLET, FILM COATED ORAL at 22:12

## 2023-06-23 RX ADMIN — Medication 250 MILLIGRAM(S): at 10:36

## 2023-06-23 RX ADMIN — Medication 1 MILLIGRAM(S): at 22:12

## 2023-06-23 RX ADMIN — AZITHROMYCIN 255 MILLIGRAM(S): 500 TABLET, FILM COATED ORAL at 20:07

## 2023-06-23 RX ADMIN — SODIUM CHLORIDE 1000 MILLILITER(S): 9 INJECTION, SOLUTION INTRAVENOUS at 12:00

## 2023-06-23 RX ADMIN — SODIUM CHLORIDE 1000 MILLILITER(S): 9 INJECTION INTRAMUSCULAR; INTRAVENOUS; SUBCUTANEOUS at 04:51

## 2023-06-23 RX ADMIN — CEFEPIME 1000 MILLIGRAM(S): 1 INJECTION, POWDER, FOR SOLUTION INTRAMUSCULAR; INTRAVENOUS at 11:00

## 2023-06-23 RX ADMIN — SODIUM CHLORIDE 1000 MILLILITER(S): 9 INJECTION, SOLUTION INTRAVENOUS at 10:36

## 2023-06-23 RX ADMIN — DIVALPROEX SODIUM 1500 MILLIGRAM(S): 500 TABLET, DELAYED RELEASE ORAL at 22:12

## 2023-06-23 RX ADMIN — CEFEPIME 100 MILLIGRAM(S): 1 INJECTION, POWDER, FOR SOLUTION INTRAMUSCULAR; INTRAVENOUS at 10:36

## 2023-06-23 RX ADMIN — Medication 1000 MILLIGRAM(S): at 14:00

## 2023-06-23 NOTE — ED ADULT NURSE NOTE - NSFALLHARMRISKINTERV_ED_ALL_ED

## 2023-06-23 NOTE — BH INPATIENT PSYCHIATRY PROGRESS NOTE - NSICDXBHPRIMARYDX_PSY_ALL_CORE
Intellectual disability   F79  

## 2023-06-23 NOTE — H&P ADULT - PROBLEM SELECTOR PLAN 2
-  consult   - continue psych meds   - Depakote 1500mg qhs   - Intuniv ER 4mg,   - Risperdal 4mg BID,   - Remeron 30mg qhs.   - begin to taper  klonopin 1mg qd from TID due to over sedation  - Gabapentin 400mgTID  - Ativan 2mg oral Q6HR PRN for agitation and anxiety.  - Haldol 5mg oral Q6HR PRN for agitation.   - Benadryl 50mg oral Q6HR PRN for agitation.

## 2023-06-23 NOTE — ED PROVIDER NOTE - CARE PLAN
1 Principal Discharge DX:	Lethargy   Principal Discharge DX:	Urinary tract infection  Secondary Diagnosis:	ROSA (acute kidney injury)

## 2023-06-23 NOTE — H&P ADULT - NSHPREVIEWOFSYSTEMS_GEN_ALL_CORE
REVIEW OF SYSTEMS:    CONSTITUTIONAL: Does have weakness. No fevers or chills  EYES/ENT: No visual changes;  No vertigo or throat pain   NECK: No pain or stiffness  RESPIRATORY: Does have cough and shortness of breath  CARDIOVASCULAR: Has chest pain. No palpitations  GASTROINTESTINAL: Does have abdominal pain. No nausea, vomiting, or hematemesis; No diarrhea or constipation. No melena or hematochezia.   GENITOURINARY: No dysuria, frequency or hematuria  NEUROLOGICAL: No numbness or weakness  SKIN: No itching, rashes

## 2023-06-23 NOTE — BH INPATIENT PSYCHIATRY PROGRESS NOTE - NSTXSUICIDDATEEST_PSY_ALL_CORE
14-Jun-2023

## 2023-06-23 NOTE — ED PROVIDER NOTE - PHYSICAL EXAMINATION
GENERAL: Not in acute distress, non-toxic appearing  HEAD: normocephalic, atraumatic  HEENT: PERRLA 3mm bilat, EOMI, normal conjunctiva, oral mucosa moist, neck supple  CARDIAC: regular rate and rhythm, normal S1 and S2,  no appreciable murmurs  PULM: clear to ascultation bilaterally, no crackles, rales, rhonchi, or wheezing  GI: abdomen nondistended, soft, nontender, no guarding or rebound tenderness  NEURO: alert and oriented x 3, normal speech, no gross neurologic deficit  MSK: No visible deformities, no peripheral edema, calf tenderness/redness/swelling  SKIN: + superficial abrasion to the right No visible rashes, dry, well-perfused  PSYCH: appropriate mood and affect

## 2023-06-23 NOTE — BH INPATIENT PSYCHIATRY PROGRESS NOTE - NSBHMSETHTPROC_PSY_A_CORE
Linear/Perseverative/Other

## 2023-06-23 NOTE — BH INPATIENT PSYCHIATRY PROGRESS NOTE - PRN MEDS
MEDICATIONS  (PRN):  albuterol    90 MICROgram(s) HFA Inhaler 2 Puff(s) Inhalation every 6 hours PRN wheezing  aluminum hydroxide/magnesium hydroxide/simethicone Suspension 30 milliLiter(s) Oral every 4 hours PRN Dyspepsia  chlorproMAZINE    Injectable 50 milliGRAM(s) IntraMuscular once PRN severe aggression  chlorproMAZINE    Tablet 50 milliGRAM(s) Oral every 6 hours PRN aggression  dextrose Oral Gel 15 Gram(s) Oral once PRN Blood Glucose LESS THAN 70 milliGRAM(s)/deciliter  LORazepam     Tablet 2 milliGRAM(s) Oral every 6 hours PRN Aggression  LORazepam   Injectable 3 milliGRAM(s) IntraMuscular once PRN severe aggression

## 2023-06-23 NOTE — ED PROVIDER NOTE - CLINICAL SUMMARY MEDICAL DECISION MAKING FREE TEXT BOX
33 year old male comes into the ED from [facility] home [facility], for eval of increased lethargy for the last 2-3 days. Staff member at bedside states the Pt came to the facility ~1 week ago and was initially pretty active. Over the past few days, he has been eating and drinking less, less active, and more lethargic. Here in the ED he is complaining of some chest pain and some SOB. Exam shows a superficial wound on his abd but otherwise unremarkable. Will order ACS work up, as well as infectious work up, CT abd/pelvis, and CT head. Will reassess to determine dispo pending work up.

## 2023-06-23 NOTE — ED ADULT NURSE NOTE - CHIEF COMPLAINT QUOTE
Patient brought in by EMS from [facility] for lethargy. Per EMS, staff noticed pt. more lethargic since yesterday. Pt. endorses CP and SOB. Placed on 2L NC by EMS, 96%. PMH HLD, DM, autism, schizophrenia.    lauren shaw ([facility]) [phone]

## 2023-06-23 NOTE — BH INPATIENT PSYCHIATRY PROGRESS NOTE - GENERAL APPEARANCE
Developmentally delayed

## 2023-06-23 NOTE — ED PROVIDER NOTE - ATTENDING CONTRIBUTION TO CARE
Afebrile. Awake and Alert. Lungs CTA. Heart RRR. Abdomen soft, TTP LLQ and SP, ND. CN II-XII grossly intact. Moves all extremities without lateralization.
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
## 2023-06-23 NOTE — BH INPATIENT PSYCHIATRY PROGRESS NOTE - NSBHCONSBHPROVDETAILS_PSY_A_CORE  FT
Rich Robison [phone]
 Rich Robison [phone]
 Rich Robison [phone]
 Rich Robison [phone]
 Rich Robison [phone]
 Rich Robison [phone]
 Rich Robison [phone]
 Rich Robison [phone]
 Rich Robison [phone]
 Rich Robison [phone]

## 2023-06-23 NOTE — BH INPATIENT PSYCHIATRY PROGRESS NOTE - NSTXSUICIDPROGRES_PSY_ALL_CORE
No Change
No Change
Met - goal discontinued

## 2023-06-23 NOTE — BH INPATIENT PSYCHIATRY PROGRESS NOTE - NSBHATTESTBILLING_PSY_A_CORE
20112-Dqxwqrhtkd OBS or IP - moderate complexity OR 35-49 mins
36851-Vpgjcuizis OBS or IP - moderate complexity OR 35-49 mins
51958-Iqswbbunuz OBS or IP - moderate complexity OR 35-49 mins
52126-Mwmywmscsv OBS or IP - moderate complexity OR 35-49 mins
33658-Fuuauszoji OBS or IP - moderate complexity OR 35-49 mins
66359-Zthawbjiix OBS or IP - moderate complexity OR 35-49 mins
17633-Trhsfqqqgu OBS or IP - moderate complexity OR 35-49 mins
40585-Tzqejwmbzx OBS or IP - moderate complexity OR 35-49 mins
73876-Jkpvoqsbpf OBS or IP - moderate complexity OR 35-49 mins
92512-Sqqkbbpotp OBS or IP - moderate complexity OR 35-49 mins

## 2023-06-23 NOTE — BH DISCHARGE NOTE NURSING/SOCIAL WORK/PSYCH REHAB - PATIENT PORTAL LINK FT
You can access the FollowMyHealth Patient Portal offered by [facility] by registering at the following website: [email or web address]. By joining [employer]’s FollowMyHealth portal, you will also be able to view your health information using other applications (apps) compatible with our system.

## 2023-06-23 NOTE — BH INPATIENT PSYCHIATRY PROGRESS NOTE - CURRENT MEDICATION
MEDICATIONS  (STANDING):  atorvastatin 10 milliGRAM(s) Oral at bedtime  bacitracin   Ointment 1 Application(s) Topical two times a day  divalproex ER 1500 milliGRAM(s) Oral at bedtime  ferrous    sulfate 325 milliGRAM(s) Oral at bedtime  gabapentin 400 milliGRAM(s) Oral three times a day  glucagon  Injectable 1 milliGRAM(s) IntraMuscular once  guanFACINE ER 4 milliGRAM(s) Oral at bedtime  insulin lispro (ADMELOG) corrective regimen sliding scale   SubCutaneous three times a day before meals  levothyroxine 50 MICROGram(s) Oral daily  metFORMIN 1000 milliGRAM(s) Oral daily  mirtazapine 15 milliGRAM(s) Oral at bedtime  risperiDONE   Tablet 4 milliGRAM(s) Oral two times a day  tamsulosin 0.4 milliGRAM(s) Oral at bedtime    MEDICATIONS  (PRN):  albuterol    90 MICROgram(s) HFA Inhaler 2 Puff(s) Inhalation every 6 hours PRN wheezing  aluminum hydroxide/magnesium hydroxide/simethicone Suspension 30 milliLiter(s) Oral every 4 hours PRN Dyspepsia  chlorproMAZINE    Injectable 50 milliGRAM(s) IntraMuscular once PRN severe aggression  chlorproMAZINE    Tablet 50 milliGRAM(s) Oral every 6 hours PRN aggression  dextrose Oral Gel 15 Gram(s) Oral once PRN Blood Glucose LESS THAN 70 milliGRAM(s)/deciliter  LORazepam     Tablet 2 milliGRAM(s) Oral every 6 hours PRN Aggression  LORazepam   Injectable 3 milliGRAM(s) IntraMuscular once PRN severe aggression   MEDICATIONS  (STANDING):  atorvastatin 10 milliGRAM(s) Oral at bedtime  bacitracin   Ointment 1 Application(s) Topical two times a day  divalproex ER 1500 milliGRAM(s) Oral at bedtime  ferrous    sulfate 325 milliGRAM(s) Oral at bedtime  gabapentin 400 milliGRAM(s) Oral three times a day  glucagon  Injectable 1 milliGRAM(s) IntraMuscular once  guanFACINE ER 4 milliGRAM(s) Oral at bedtime  insulin lispro (ADMELOG) corrective regimen sliding scale   SubCutaneous three times a day before meals  insulin lispro (ADMELOG) corrective regimen sliding scale   SubCutaneous at bedtime  levothyroxine 50 MICROGram(s) Oral daily  metFORMIN 1000 milliGRAM(s) Oral daily  mirtazapine 15 milliGRAM(s) Oral at bedtime  risperiDONE   Tablet 4 milliGRAM(s) Oral two times a day  tamsulosin 0.4 milliGRAM(s) Oral at bedtime    MEDICATIONS  (PRN):  albuterol    90 MICROgram(s) HFA Inhaler 2 Puff(s) Inhalation every 6 hours PRN wheezing  aluminum hydroxide/magnesium hydroxide/simethicone Suspension 30 milliLiter(s) Oral every 4 hours PRN Dyspepsia  chlorproMAZINE    Injectable 50 milliGRAM(s) IntraMuscular once PRN severe aggression  chlorproMAZINE    Tablet 50 milliGRAM(s) Oral every 6 hours PRN aggression  dextrose Oral Gel 15 Gram(s) Oral once PRN Blood Glucose LESS THAN 70 milliGRAM(s)/deciliter  LORazepam     Tablet 2 milliGRAM(s) Oral every 6 hours PRN Aggression  LORazepam   Injectable 3 milliGRAM(s) IntraMuscular once PRN severe aggression

## 2023-06-23 NOTE — BH INPATIENT PSYCHIATRY PROGRESS NOTE - NSBHASSESSSUMMFT_PSY_ALL_CORE
33 year old single male, domiciled at [facility]. Patient has past psychiatric history of moderate ID, ASD, impulse control disorder, factitious disorder, HAVEN, mood disorders, PTSD and ADHD.  Patient has extensive history of inpatient hospitalization and ED visits. Most recently patient was discharged from [facility] on 6/6/23.  Patient is re-hospitalized with a primary problem self inflicting laceration to abdomen, CAH to hurt self and others.  Patient admitted to Health system on a 9.27 legal status.   Patient requires inpatient hospitalization due to symptoms of mental illness so severe that they significantly interfere with activities of daily living, and presents a potential danger to self as a result of acute decompensation with s/p  SA,  he is requiring 24-hour care at this time as a result, for psychiatric stabilization and safety.     Plan:  >Legal: 9.27  >Obs: Continue CO for SIB  >Psychiatric Meds:   -Depakote 1500mg qhs   - Intuniv ER 4mg,   -Risperdal 4mg BID,   -Remeron 30mg qhs.   -begin to taper  klonopin 1mg qd from TID due to over sedation  -increase Gabapentin 400mgTID  Ativan 2mg oral Q6HR PRN for agitation and anxiety.  Haldol 5mg oral Q6HR PRN for agitation.   Benadryl 50mg oral Q6HR PRN for agitation.   >Labs:  labs reviewed, no acute findings. U-tox negative, VPA level 61.40. Xray to left shoulder and clavicle resulted unremarkable.  Hold antipsychotics if QTc >500  >Medical: S/P abdomen lacerations.  Medicine and wound care consultations needed.  Patient with consistently stable VS.  During the course of treatment, will collaborate with medical team to manage medical issues.  >Diet: Safety tray  >Consult: Wound consult and PT  >Social: milieu/structured therapy  >Treatment Interventions: Groups and Individual Therapy/CBT, Motivational counseling for substance abuse related issues.   >Dispo: Collateral and dispo planning pending further symptom and medication optimization

## 2023-06-23 NOTE — ED PROCEDURE NOTE - ATTENDING CONTRIBUTION TO CARE
I was present in the department during the E/M service provided. I was in the department during the key portions of the service provided. LEVI.

## 2023-06-23 NOTE — BH INPATIENT PSYCHIATRY PROGRESS NOTE - NSTXSUICIDDATETRGT_PSY_ALL_CORE
21-Jun-2023
28-Jun-2023
21-Jun-2023
28-Jun-2023

## 2023-06-23 NOTE — BH INPATIENT PSYCHIATRY PROGRESS NOTE - NSTXPROBSUICID_PSY_ALL_CORE
SUICIDE/SELF-INJURIOUS BEHAVIOR
Initial (On Arrival)
SUICIDE/SELF-INJURIOUS BEHAVIOR

## 2023-06-23 NOTE — ED ADULT NURSE REASSESSMENT NOTE - NSFALLRISKINTERV_ED_ALL_ED

## 2023-06-23 NOTE — H&P ADULT - ASSESSMENT
33 year old male comes into the ED from [facility] (originally from [facility]) for lethargy ongoing for 2-3 days being treated for pneumonia.  33 year old male comes into the ED from [facility] (originally from [facility]) for lethargy ongoing for 2-3 days being treated for pneumonia.        Patient presented to [facility] about 1 week prior. His mother had found objects pt uses to cut himself. Patient has auditory hallucinations that tell him to do so. While at [facility], pt was active. However, past 2-3 days he has been eating and drinking less. In the ED, he is complaining of a cough with yellow sputum, sore throat, runny nose, generalized abd + chest pain. Chest pain is substernal, patient cannot give any further details.     In the ED: CT scan was completed, with findings of possible PNA. Pt was given 3 L of fluid. Given a dose of cefepime and vanc. Patient was admitted for further mgmt.      Trace bilateral effusions. Right lower lobe airspace opacity new since   the previous exam. This could represent infection. Correlate clinically.   Recommend follow-up to resolution. 33 year old male comes into the ED from [facility] (originally from [facility]) for lethargy ongoing for 2-3 days being treated for pneumonia.

## 2023-06-23 NOTE — H&P ADULT - NSHPPHYSICALEXAM_GEN_ALL_CORE
.  VITAL SIGNS:  T(C): 36.9 (06-23-23 @ 12:00), Max: 37.4 (06-23-23 @ 02:07)  T(F): 98.4 (06-23-23 @ 12:00), Max: 99.4 (06-23-23 @ 02:07)  HR: 62 (06-23-23 @ 12:00) (62 - 98)  BP: 96/76 (06-23-23 @ 12:00) (96/76 - 117/74)  BP(mean): --  RR: 20 (06-23-23 @ 12:00) (16 - 21)  SpO2: 100% (06-23-23 @ 12:00) (94% - 100%)  Wt(kg): --    PHYSICAL EXAM:    Constitutional: NAD, resting comfortably, disheveled   Head: NC/AT  Eyes: anicteric sclera  Respiratory: rales ar lung bases, No wheezing or rhonchi, no retractions   Cardiac: +S1/S2; RRR; no M/R/G  Gastrointestinal: abdomen soft, non-distended, no rebound or guarding; +BSx4  Extremities: no peripheral edema  Musculoskeletal: NROM x4; no joint swelling, tenderness or erythema  Vascular: 2+ radial, DP pulses B/L  Neurologic: AAOx3; no gross focal deficits  Psychiatric: tangential

## 2023-06-23 NOTE — BH INPATIENT PSYCHIATRY PROGRESS NOTE - NSTXDCOTHRGOAL_PSY_ALL_CORE
Pt will show a reduction of symptoms and engage meaningfully with writer to identify a safe discharge plan. Pt will comply with recommended tx plan and medications for 5 days, identify 2 benefits for adhering to tx.

## 2023-06-23 NOTE — ED ADULT NURSE NOTE - OBJECTIVE STATEMENT
Pt is alert and responsive. Pt is calm and cooperative. Pt was medicated this weekend for aggressive behavior and self harm. As per staff, pt has been lethargic for a few days. No acute distress observed. Able to make needs known.

## 2023-06-23 NOTE — BH INPATIENT PSYCHIATRY PROGRESS NOTE - NSBHMETABOLIC_PSY_ALL_CORE_FT
BMI: BMI (kg/m2): 41 (06-22-23 @ 20:46)  HbA1c: A1C with Estimated Average Glucose Result: 6.5 % (05-31-23 @ 09:26)    Glucose: POCT Blood Glucose.: 133 mg/dL (06-22-23 @ 20:53)    BP: 98/70 (06-22-23 @ 19:29) (98/70 - 111/88)  Lipid Panel: Date/Time: 05-31-23 @ 09:26  Cholesterol, Serum: 128  Direct LDL: --  HDL Cholesterol, Serum: 31  Total Cholesterol/HDL Ration Measurement: --  Triglycerides, Serum: 157

## 2023-06-23 NOTE — BH INPATIENT PSYCHIATRY PROGRESS NOTE - NSTXDCOTHRDATEEST_PSY_ALL_CORE
14-Jun-2023
21-Jun-2023
14-Jun-2023
21-Jun-2023
14-Jun-2023

## 2023-06-23 NOTE — BH INPATIENT PSYCHIATRY PROGRESS NOTE - NSBHCHARTREVIEWVS_PSY_A_CORE FT
Vital Signs Last 24 Hrs  T(C): 37.4 (06-23-23 @ 02:07), Max: 37.4 (06-23-23 @ 02:07)  T(F): 99.4 (06-23-23 @ 02:07), Max: 99.4 (06-23-23 @ 02:07)  HR: 69 (06-23-23 @ 07:08) (69 - 98)  BP: 107/73 (06-23-23 @ 07:08) (98/70 - 117/74)  BP(mean): --  RR: 19 (06-23-23 @ 07:08) (16 - 20)  SpO2: 99% (06-23-23 @ 07:08) (94% - 99%)    Orthostatic VS  06-21-23 @ 19:50  Lying BP: 117/70 HR: 76  Sitting BP: 102/81 HR: 92  Standing BP: --/-- HR: --  Site: --  Mode: --  Orthostatic VS  06-21-23 @ 19:00  Lying BP: --/-- HR: --  Sitting BP: 111/88 HR: 89  Standing BP: --/-- HR: --  Site: --  Mode: --   Vital Signs Last 24 Hrs  T(C): 37 (06-23-23 @ 09:50), Max: 37.4 (06-23-23 @ 02:07)  T(F): 98.6 (06-23-23 @ 09:50), Max: 99.4 (06-23-23 @ 02:07)  HR: 67 (06-23-23 @ 09:50) (67 - 98)  BP: 99/66 (06-23-23 @ 09:50) (98/70 - 117/74)  BP(mean): --  RR: 21 (06-23-23 @ 09:50) (16 - 21)  SpO2: 100% (06-23-23 @ 09:50) (94% - 100%)    Orthostatic VS  06-21-23 @ 19:50  Lying BP: 117/70 HR: 76  Sitting BP: 102/81 HR: 92  Standing BP: --/-- HR: --  Site: --  Mode: --  Orthostatic VS  06-21-23 @ 19:00  Lying BP: --/-- HR: --  Sitting BP: 111/88 HR: 89  Standing BP: --/-- HR: --  Site: --  Mode: --   Vital Signs Last 24 Hrs  T(C): 36.9 (06-23-23 @ 12:00), Max: 37.4 (06-23-23 @ 02:07)  T(F): 98.4 (06-23-23 @ 12:00), Max: 99.4 (06-23-23 @ 02:07)  HR: 62 (06-23-23 @ 12:00) (62 - 98)  BP: 96/76 (06-23-23 @ 12:00) (96/76 - 117/74)  BP(mean): --  RR: 20 (06-23-23 @ 12:00) (16 - 21)  SpO2: 100% (06-23-23 @ 12:00) (94% - 100%)    Orthostatic VS  06-21-23 @ 19:50  Lying BP: 117/70 HR: 76  Sitting BP: 102/81 HR: 92  Standing BP: --/-- HR: --  Site: --  Mode: --  Orthostatic VS  06-21-23 @ 19:00  Lying BP: --/-- HR: --  Sitting BP: 111/88 HR: 89  Standing BP: --/-- HR: --  Site: --  Mode: --

## 2023-06-23 NOTE — H&P ADULT - NSHPLABSRESULTS_GEN_ALL_CORE
.  LABS:                         13.7   12.69 )-----------( 179      ( 2023 01:12 )             43.6         134<L>  |  96<L>  |  14  ----------------------------<  133<H>  4.9   |  27  |  1.06    Ca    9.0      2023 01:12  Mg     1.90         TPro  6.4  /  Alb  3.8  /  TBili  0.4  /  DBili  x   /  AST  21  /  ALT  56<H>  /  AlkPhos  92        Urinalysis Basic - ( 2023 06:17 )    Color: Light Yellow / Appearance: Clear / S.032 / pH: x  Gluc: x / Ketone: Negative  / Bili: Negative / Urobili: <2 mg/dL   Blood: x / Protein: Negative / Nitrite: Negative   Leuk Esterase: Negative / RBC: x / WBC x   Sq Epi: x / Non Sq Epi: x / Bacteria: x                RADIOLOGY, EKG & ADDITIONAL TESTS:    EKG: personally reviewed. NSR, normal qtc   BMP/CBC: personally reviewed. Cr stable   CXR: personally reviewed.

## 2023-06-23 NOTE — BH INPATIENT PSYCHIATRY PROGRESS NOTE - NSBHFUPINTERVALCCFT_PSY_A_CORE
CAH, s/p SA,   ASD/Depression

## 2023-06-23 NOTE — BH INPATIENT PSYCHIATRY PROGRESS NOTE - NSBHFUPINTERVALHXFT_PSY_A_CORE
Patient is discussed in treatment  team.  Discussed with SW regarding dischargingc patient back to residence.  Per SW, residence was contacted yesterday, per residence they are requesting a dischargec meeting, pts mother wants to be present.  Meeting is scheduled for Monday 6/26  Per nursing CAROL was paged last night due to change in mental status, lethargy and minimal responsive to tactile stimulation with sternal rub. Pt unable to answerer ROS. P.E. no focal deficits. He was transfer to the ED for further evaluation.      Today writer called ED and spoke with attending in ED Dr. Retana who reported that patient has pneumonia.  He was started on antibiotics in ED and will be returning to unit.  Writer also spoke to hospitalist Dr. Jones who reported that he received report from ED and is aware that patient is returning to unit. Per Dr. Jones he will follow up with patient upon return.    Discussed with nursing team to increase ambulation with patient upon return to unit.    Patient is discussed in treatment  team.  Discussed with SW regarding dischargingc patient back to residence.  Per SW, residence was contacted yesterday, per residence they are requesting a dischargec meeting, pts mother wants to be present.  Meeting is scheduled for Monday 6/26  Per nursing CAROL was paged last night due to change in mental status, lethargy and minimal responsive to tactile stimulation with sternal rub. Pt unable to answerer ROS. P.E. no focal deficits. He was transfer to the ED for further evaluation.      Today writer called ED and spoke with attending in ED Dr. Retana who reported that patient has pneumonia.  He was started on IV antibiotics in ED and will be returning to unit, will continue po antibiotics.  Writer also spoke to hospitalist Dr. Jones who reported that he received report from ED and is aware that patient is returning to unit. Per Dr. Jones he will follow up with patient upon return.    Discussed with nursing team to increase ambulation with patient upon return to unit.

## 2023-06-23 NOTE — BH INPATIENT PSYCHIATRY PROGRESS NOTE - NSTXDCOTHRDATETRGT_PSY_ALL_CORE
21-Jun-2023
21-Jun-2023
28-Jun-2023
28-Jun-2023
21-Jun-2023

## 2023-06-23 NOTE — BH DISCHARGE NOTE NURSING/SOCIAL WORK/PSYCH REHAB - NSDCVIVACCINE_GEN_ALL_CORE_FT
Tdap; 20-Dec-2018 12:21; Lo Anaya (RN); [employer]; [id number] (Exp. Date: 02-Nov-2020); IntraMuscular; Deltoid Left.; 0.5 milliLiter(s); VIS (VIS Published: 09-May-2013, VIS Presented: 20-Dec-2018);   Tdap; 26-Mar-2019 18:59; Shavon Barrios (RN); [employer]; [id number] (Exp. Date: 26-Feb-2021); IntraMuscular; Deltoid Left.; 0.5 milliLiter(s); VIS (VIS Published: 09-May-2013, VIS Presented: 26-Mar-2019);   Tdap; 01-Dec-2021 09:35; Zamzam Coulter (RN); [employer]; [id number] (Exp. Date: 09-Sep-2023); IntraMuscular; Deltoid Left.; 0.5 milliLiter(s); VIS (VIS Published: 09-May-2013, VIS Presented: 01-Dec-2021);   Tdap; 21-Jul-2022 01:28; Rose Hancock (RN); [employer]; [id number] (Exp. Date: 14-Oct-2024); IntraMuscular; Deltoid Right.; 0.5 milliLiter(s); VIS (VIS Published: 09-May-2013, VIS Presented: 21-Jul-2022);   Tdap; 12-Jun-2023 21:01; Diann Paul (RN); [employer]; [id number] (Exp. Date: 08-Mar-2025); IntraMuscular; Deltoid Left.; 0.5 milliLiter(s); VIS (VIS Published: 09-May-2013, VIS Presented: 12-Jun-2023);

## 2023-06-23 NOTE — BH INPATIENT PSYCHIATRY PROGRESS NOTE - NSDCCRITERIA_PSY_ALL_CORE
Symptom Stabilization  Medically stable  CGI<=3  Outpatient services f/u  Consider [facility]CONNOR    
Symptom Stabilization  CGI<=3  Outpatient services f/u    
Symptom Stabilization  Medically stable  CGI<=3  Outpatient services f/u  Consider OCONNOR    
Symptom Stabilization  Medically stable  CGI<=3  Outpatient services f/u    
Symptom Stabilization  Medically stable  CGI<=3  Outpatient services f/u  Consider OCONNOR    
Symptom Stabilization  Medically stable  CGI<=3  Outpatient services f/u  Consider OCONNOR

## 2023-06-23 NOTE — BH INPATIENT PSYCHIATRY PROGRESS NOTE - NSCGISEVERILLNESS_PSY_ALL_CORE
7 = Among the most extremely ill patients – pathology drastically interferes in many life functions; may be hospitalized

## 2023-06-23 NOTE — H&P ADULT - PROBLEM SELECTOR PLAN 1
- pt with 2-3 days of cough with yellow sputum, sore throat, and runny nose   - procal elevated, leukocytosis noted   - CT with right lower lobe airspace opacity new since the previous exam. This could represent infection.   - check blood, sputum cultures   - start ceftriaxone and azithromycin

## 2023-06-23 NOTE — BH INPATIENT PSYCHIATRY PROGRESS NOTE - NSBHCHARTREVIEWLAB_PSY_A_CORE FT
Admission labs reviewed no acute findings  utox negative  BAL <10   
 labs reviewed no acute findings  utox negative  BAL <10   
Admission labs reviewed no acute findings  utox negative  BAL <10   
 labs reviewed no acute findings  utox negative  BAL <10   
Admission labs reviewed no acute findings  utox negative  BAL <10   

## 2023-06-23 NOTE — H&P ADULT - HISTORY OF PRESENT ILLNESS
33 year old male comes into the ED from [facility] (originally from [facility]) for lethargy ongoing for 2-3 days. Patient presented to [facility] about 1 week prior. His mother had found objects pt uses to cut himself. Patient has auditory hallucinations that tell him to do so. While at [facility], pt was active. However, past 2-3 days he has been eating and drinking less. In the ED, he is complaining of a cough with yellow sputum, sore throat, runny nose, generalized abd + chest pain. Chest pain is substernal, patient cannot give any further details.     In the ED: CT scan was completed, with findings of possible PNA. Pt was given 3 L of fluid. Given a dose of cefepime and vanc. Patient was admitted for further mgmt.

## 2023-06-23 NOTE — ED PROVIDER NOTE - PROGRESS NOTE DETAILS
Reached out to [facility]. Pt has been getting Depakote, gabapentin, Guanfacine, risperidone, Remeron, and PRN Thorazine and ativan. None of these medications have been new or changed from the last 3-4 days. Spoke with Edward Irizarry from [facility]. Discussed labs to date. If pt's CT is negative and he is not altered, they have a bed ready for him back at [facility]. Merary Retana M.D. (Resident Physician): Given cefepime and vanc. BPs soft s/p 3L fluids. Will admit to hospitalist. Irvin perez. Merary Retana M.D. (Resident Physician): 1:1 initiated at 14:41

## 2023-06-23 NOTE — BH DISCHARGE NOTE NURSING/SOCIAL WORK/PSYCH REHAB - NSDCPRGOAL_PSY_ALL_CORE
Patient was admitted to [facility] due to Pt being diagnosed with Pneumonia .    On the unit, patient was on CO 1:1 due to increased suicidality. Patient was compliant with medication.     Patient attended approximately 2 psychiatric rehabilitation groups during current hospitalization.

## 2023-06-24 ENCOUNTER — TRANSCRIPTION ENCOUNTER (OUTPATIENT)
Age: 33
End: 2023-06-24

## 2023-06-24 DIAGNOSIS — F84.0 AUTISTIC DISORDER: ICD-10-CM

## 2023-06-24 LAB
A1C WITH ESTIMATED AVERAGE GLUCOSE RESULT: 6.7 % — HIGH (ref 4–5.6)
ALBUMIN SERPL ELPH-MCNC: 3.2 G/DL — LOW (ref 3.3–5)
ALP SERPL-CCNC: 87 U/L — SIGNIFICANT CHANGE UP (ref 40–120)
ALT FLD-CCNC: 55 U/L — HIGH (ref 4–41)
ANION GAP SERPL CALC-SCNC: 12 MMOL/L — SIGNIFICANT CHANGE UP (ref 7–14)
AST SERPL-CCNC: 30 U/L — SIGNIFICANT CHANGE UP (ref 4–40)
BASOPHILS # BLD AUTO: 0.04 K/UL — SIGNIFICANT CHANGE UP (ref 0–0.2)
BASOPHILS NFR BLD AUTO: 0.5 % — SIGNIFICANT CHANGE UP (ref 0–2)
BILIRUB SERPL-MCNC: 0.2 MG/DL — SIGNIFICANT CHANGE UP (ref 0.2–1.2)
BUN SERPL-MCNC: 9 MG/DL — SIGNIFICANT CHANGE UP (ref 7–23)
CALCIUM SERPL-MCNC: 8.7 MG/DL — SIGNIFICANT CHANGE UP (ref 8.4–10.5)
CHLORIDE SERPL-SCNC: 104 MMOL/L — SIGNIFICANT CHANGE UP (ref 98–107)
CHOLEST SERPL-MCNC: 118 MG/DL — SIGNIFICANT CHANGE UP
CO2 SERPL-SCNC: 23 MMOL/L — SIGNIFICANT CHANGE UP (ref 22–31)
CREAT SERPL-MCNC: 0.83 MG/DL — SIGNIFICANT CHANGE UP (ref 0.5–1.3)
EGFR: 118 ML/MIN/1.73M2 — SIGNIFICANT CHANGE UP
EOSINOPHIL # BLD AUTO: 0.32 K/UL — SIGNIFICANT CHANGE UP (ref 0–0.5)
EOSINOPHIL NFR BLD AUTO: 3.9 % — SIGNIFICANT CHANGE UP (ref 0–6)
ESTIMATED AVERAGE GLUCOSE: 146 — SIGNIFICANT CHANGE UP
GLUCOSE BLDC GLUCOMTR-MCNC: 106 MG/DL — HIGH (ref 70–99)
GLUCOSE BLDC GLUCOMTR-MCNC: 111 MG/DL — HIGH (ref 70–99)
GLUCOSE BLDC GLUCOMTR-MCNC: 183 MG/DL — HIGH (ref 70–99)
GLUCOSE SERPL-MCNC: 162 MG/DL — HIGH (ref 70–99)
HCT VFR BLD CALC: 42.5 % — SIGNIFICANT CHANGE UP (ref 39–50)
HDLC SERPL-MCNC: 25 MG/DL — LOW
HGB BLD-MCNC: 13.4 G/DL — SIGNIFICANT CHANGE UP (ref 13–17)
IANC: 5.46 K/UL — SIGNIFICANT CHANGE UP (ref 1.8–7.4)
IMM GRANULOCYTES NFR BLD AUTO: 1.3 % — HIGH (ref 0–0.9)
LIPID PNL WITH DIRECT LDL SERPL: 62 MG/DL — SIGNIFICANT CHANGE UP
LYMPHOCYTES # BLD AUTO: 1.47 K/UL — SIGNIFICANT CHANGE UP (ref 1–3.3)
LYMPHOCYTES # BLD AUTO: 18 % — SIGNIFICANT CHANGE UP (ref 13–44)
MCHC RBC-ENTMCNC: 26.1 PG — LOW (ref 27–34)
MCHC RBC-ENTMCNC: 31.5 GM/DL — LOW (ref 32–36)
MCV RBC AUTO: 82.7 FL — SIGNIFICANT CHANGE UP (ref 80–100)
MONOCYTES # BLD AUTO: 0.77 K/UL — SIGNIFICANT CHANGE UP (ref 0–0.9)
MONOCYTES NFR BLD AUTO: 9.4 % — SIGNIFICANT CHANGE UP (ref 2–14)
NEUTROPHILS # BLD AUTO: 5.46 K/UL — SIGNIFICANT CHANGE UP (ref 1.8–7.4)
NEUTROPHILS NFR BLD AUTO: 66.9 % — SIGNIFICANT CHANGE UP (ref 43–77)
NON HDL CHOLESTEROL: 93 MG/DL — SIGNIFICANT CHANGE UP
NRBC # BLD: 0 /100 WBCS — SIGNIFICANT CHANGE UP (ref 0–0)
NRBC # FLD: 0 K/UL — SIGNIFICANT CHANGE UP (ref 0–0)
PLATELET # BLD AUTO: 174 K/UL — SIGNIFICANT CHANGE UP (ref 150–400)
POTASSIUM SERPL-MCNC: 4.2 MMOL/L — SIGNIFICANT CHANGE UP (ref 3.5–5.3)
POTASSIUM SERPL-SCNC: 4.2 MMOL/L — SIGNIFICANT CHANGE UP (ref 3.5–5.3)
PROT SERPL-MCNC: 6 G/DL — SIGNIFICANT CHANGE UP (ref 6–8.3)
RBC # BLD: 5.14 M/UL — SIGNIFICANT CHANGE UP (ref 4.2–5.8)
RBC # FLD: 13.8 % — SIGNIFICANT CHANGE UP (ref 10.3–14.5)
SODIUM SERPL-SCNC: 139 MMOL/L — SIGNIFICANT CHANGE UP (ref 135–145)
TRIGL SERPL-MCNC: 155 MG/DL — HIGH
WBC # BLD: 8.17 K/UL — SIGNIFICANT CHANGE UP (ref 3.8–10.5)
WBC # FLD AUTO: 8.17 K/UL — SIGNIFICANT CHANGE UP (ref 3.8–10.5)

## 2023-06-24 PROCEDURE — 90792 PSYCH DIAG EVAL W/MED SRVCS: CPT

## 2023-06-24 PROCEDURE — 99232 SBSQ HOSP IP/OBS MODERATE 35: CPT

## 2023-06-24 RX ORDER — CHLORPROMAZINE HCL 10 MG
50 TABLET ORAL EVERY 6 HOURS
Refills: 0 | Status: DISCONTINUED | OUTPATIENT
Start: 2023-06-24 | End: 2023-06-24

## 2023-06-24 RX ORDER — CHLORPROMAZINE HCL 10 MG
50 TABLET ORAL EVERY 6 HOURS
Refills: 0 | Status: DISCONTINUED | OUTPATIENT
Start: 2023-06-24 | End: 2023-06-28

## 2023-06-24 RX ORDER — BENZOCAINE AND MENTHOL 5; 1 G/100ML; G/100ML
1 LIQUID ORAL THREE TIMES A DAY
Refills: 0 | Status: DISCONTINUED | OUTPATIENT
Start: 2023-06-24 | End: 2023-06-28

## 2023-06-24 RX ADMIN — TAMSULOSIN HYDROCHLORIDE 0.4 MILLIGRAM(S): 0.4 CAPSULE ORAL at 22:38

## 2023-06-24 RX ADMIN — DIVALPROEX SODIUM 1500 MILLIGRAM(S): 500 TABLET, DELAYED RELEASE ORAL at 22:39

## 2023-06-24 RX ADMIN — ATORVASTATIN CALCIUM 10 MILLIGRAM(S): 80 TABLET, FILM COATED ORAL at 22:38

## 2023-06-24 RX ADMIN — Medication 1: at 17:46

## 2023-06-24 RX ADMIN — MIRTAZAPINE 30 MILLIGRAM(S): 45 TABLET, ORALLY DISINTEGRATING ORAL at 22:39

## 2023-06-24 RX ADMIN — RISPERIDONE 4 MILLIGRAM(S): 4 TABLET ORAL at 17:47

## 2023-06-24 RX ADMIN — ENOXAPARIN SODIUM 40 MILLIGRAM(S): 100 INJECTION SUBCUTANEOUS at 22:40

## 2023-06-24 RX ADMIN — PANTOPRAZOLE SODIUM 40 MILLIGRAM(S): 20 TABLET, DELAYED RELEASE ORAL at 09:44

## 2023-06-24 RX ADMIN — RISPERIDONE 4 MILLIGRAM(S): 4 TABLET ORAL at 10:21

## 2023-06-24 RX ADMIN — SODIUM CHLORIDE 100 MILLILITER(S): 9 INJECTION, SOLUTION INTRAVENOUS at 00:41

## 2023-06-24 RX ADMIN — TAMSULOSIN HYDROCHLORIDE 0.4 MILLIGRAM(S): 0.4 CAPSULE ORAL at 00:41

## 2023-06-24 RX ADMIN — GABAPENTIN 400 MILLIGRAM(S): 400 CAPSULE ORAL at 09:44

## 2023-06-24 RX ADMIN — GABAPENTIN 400 MILLIGRAM(S): 400 CAPSULE ORAL at 22:40

## 2023-06-24 RX ADMIN — Medication 50 MICROGRAM(S): at 09:36

## 2023-06-24 RX ADMIN — LORATADINE 10 MILLIGRAM(S): 10 TABLET ORAL at 13:25

## 2023-06-24 RX ADMIN — ENOXAPARIN SODIUM 40 MILLIGRAM(S): 100 INJECTION SUBCUTANEOUS at 09:12

## 2023-06-24 RX ADMIN — Medication 1 MILLIGRAM(S): at 13:25

## 2023-06-24 RX ADMIN — CEFTRIAXONE 100 MILLIGRAM(S): 500 INJECTION, POWDER, FOR SOLUTION INTRAMUSCULAR; INTRAVENOUS at 00:41

## 2023-06-24 RX ADMIN — GABAPENTIN 400 MILLIGRAM(S): 400 CAPSULE ORAL at 13:25

## 2023-06-24 NOTE — BH CONSULTATION LIAISON ASSESSMENT NOTE - DIFFERENTIAL
Acute depression   F32.A  Autistic spectrum disorder   F84.0  HAVEN (generalized anxiety disorder)   F41.1  Post traumatic stress disorder (PTSD)   F43.10  Impulse control disorder   F63.9

## 2023-06-24 NOTE — BH CONSULTATION LIAISON ASSESSMENT NOTE - HPI (INCLUDE ILLNESS QUALITY, SEVERITY, DURATION, TIMING, CONTEXT, MODIFYING FACTORS, ASSOCIATED SIGNS AND SYMPTOMS)
Pt is a 34 yo M, single, disabled, non-caregiver, resides at a [facility], with psych h/o moderate ID, ASD, impulse control disorder, factitious disorder, HAVEN, mood disorders, PTSD and ADHD, multiple past psych admissions (last known [facility] 6/13/23-present), numerous ED visits for both medical/psych complaints, longstanding h/o SIB (head banging, cutting), but no known SA, longstanding h/o endorsing SI, h/o aggression toward staff at , PMH significant for asthma, DM, urinary retention, GERD, BPH, hypothyroidism, HLD, HTN, and b/l myopia, on Depakote 500 mg BID, Guanfacine 2 mg, Risperdal 4 mg, and Remeron 15 mg, prescribed by his PCP, who was admitted to [facility] ML4  for self inflicted laceration to abdomen, endorsing CAH to hurt himself and staff at group home. Pt transferred to [facility] and admitted to medicine on 6/23/23 for lethargy, pneumonia. Psychiatry consulted for further management of SI, mood sx.     Pt seen this am at bedside with PCA. Per PCA, pt calm and cooperative, talkative, alert. Pt states that he has been feeling better and would like to return to [facility] so that he could be discharged to rehab. Pt notes that he is now at [facility] for bronchitis though states he has physically been feeling better. He reports that he was admitted to [facility] last week after a suicide attempt via self-inflicted laceration to the abdomen. Pt notes that he drank a few beers prior to that and then started developing CAH (one unidentifiable female voice). Pt notes that since he was admitted to [facility], the voices have resolved. He no longer has any SI, denies any HI or AVH. Pt states that protective factors include the fact that he is getting a check in the mail soon and his family who he says he is close to. He notes that he has a hx of alcohol use and vaping. He previously would drink approximately 6 beers though most recently drank 2 beers prior to his SA a few days ago. No withdrawal sx elicited. Pt interested in rehab for his alcohol use once he is able to be discharged from [facility]. Pt without any other concerns at this time. States his mood is "better." His sleep is fair, he denies any changes to his appetite.  Pt is a 34 yo M, single, disabled, non-caregiver, resides at a [facility], with psych h/o moderate ID, ASD, impulse control disorder, factitious disorder, HAVEN, mood disorders, PTSD and ADHD, multiple past psych admissions (last known [facility] 6/13/23-present), numerous ED visits for both medical/psych complaints, longstanding h/o SIB (head banging, cutting), but no known SA, longstanding h/o endorsing SI, h/o aggression toward staff at , PMH significant for asthma, DM, urinary retention, GERD, BPH, hypothyroidism, HLD, HTN, and b/l myopia,  who was admitted to [facility] ML4  for self inflicted laceration to abdomen, endorsing CAH to hurt himself and staff at group home. Pt transferred to [facility] and admitted to medicine on 6/23/23 for lethargy, pneumonia. Psychiatry consulted for further management of SI, mood sx.     Pt seen this am at bedside with PCA. Per PCA, pt calm and cooperative, talkative, alert. Pt states that he has been feeling better and would like to return to [facility] so that he could be discharged to rehab. Pt notes that he is now at [facility] for bronchitis though states he has physically been feeling better. He reports that he was admitted to [facility] last week after a suicide attempt via self-inflicted laceration to the abdomen. Pt notes that he drank a few beers prior to that and then started developing CAH (one unidentifiable female voice). Pt notes that since he was admitted to [facility], the voices have resolved. He no longer has any SI, denies any HI or AVH. Pt states that protective factors include the fact that he is getting a check in the mail soon and his family who he says he is close to. He notes that he has a hx of alcohol use and vaping. He previously would drink approximately 6 beers though most recently drank 2 beers prior to his SA a few days ago. No withdrawal sx elicited. Pt interested in rehab for his alcohol use once he is able to be discharged from [facility]. Pt without any other concerns at this time. States his mood is "better." His sleep is fair, he denies any changes to his appetite.
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
## 2023-06-24 NOTE — ED ADULT NURSE REASSESSMENT NOTE - NS ED NURSE REASSESS COMMENT FT1
Pt resting comfortably. Shows no signs of acute distress. [facility] staff at bedside. Continuous telemetry and SpO2 monitoring in place. Pending CT.
Received pt sitting in chair beside stretcher with 1:1 in motion, pt is alert and oriented x 4, states he prefers to sit in chair since he has been sitting all day. Patient admitted to medicine for pneumonia- remains on 1:1 for elopement risk and risk to self. Appears well on assessment, pending bed assignment.
Pt alert & awake, no s/s discomfort. Pt waiting for [facility] for . Daughter at bedside. No s/s acute distress, will cont' to monitor.
Pt received from off going Nurse. Pt AAOx3,from group home came with c/o weakness. no labored breathing, no s/s acute distress noted at this time. Group home aide at bedside. Will cont' to monitor.
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
## 2023-06-24 NOTE — BH CONSULTATION LIAISON ASSESSMENT NOTE - NSBHCHARTREVIEWLAB_PSY_A_CORE FT
13.4   8.17  )-----------( 174      ( 24 Jun 2023 06:10 )             42.5   06-24    139  |  104  |  9   ----------------------------<  162<H>  4.2   |  23  |  0.83    Ca    8.7      24 Jun 2023 06:10  Mg     1.90     06-23    TPro  6.0  /  Alb  3.2<L>  /  TBili  0.2  /  DBili  x   /  AST  30  /  ALT  55<H>  /  AlkPhos  87  06-24

## 2023-06-24 NOTE — BH CONSULTATION LIAISON ASSESSMENT NOTE - RISK ASSESSMENT
Pt is at moderate risk of suicide at this time. Static, nonmodifiable risk factors include hx of recent inpatient psych admission, prior SAs, multiple prior psych admissions, hx of substance use (per pt report), hx of mood disorder, male sex. Dynamic risk factors include recent CAH, impulsivity. At this time, pt is at chronically elevated risk of suicide and moderate acute risk of suicide. Pt requires inpt psych admission for further management of sx.

## 2023-06-24 NOTE — PROGRESS NOTE ADULT - PROBLEM SELECTOR PLAN 2
-  consult   - continue psych meds   - Depakote 1500mg qhs   - Intuniv ER 4mg,   - Risperdal 4mg BID,   - Remeron 30mg qhs.   - begin to taper  klonopin 1mg qd from TID due to over sedation  - Gabapentin 400mgTID  - Ativan 2mg oral Q6HR PRN for agitation and anxiety.  - Haldol 5mg oral Q6HR PRN for agitation.   - Benadryl 50mg oral Q6HR PRN for agitation. -  consult   - continue psych meds   - Depakote 1500mg qhs   - Intuniv ER 4mg,   - Risperdal 4mg BID,   - Remeron 30mg qhs.   - begin to taper  klonopin 1mg qd from TID due to over sedation  - Gabapentin 400mgTID  - ativan 2mg PO/IM/IV q6h PRN  - thorazine 50mg PO/IV q6h PRN

## 2023-06-24 NOTE — ED ADULT NURSE REASSESSMENT NOTE - NSFALLUNIVINTERV_ED_ALL_ED
Bed/Stretcher in lowest position, wheels locked, appropriate side rails in place/Call bell, personal items and telephone in reach/Instruct patient to call for assistance before getting out of bed/chair/stretcher/Non-slip footwear applied when patient is off stretcher/[place] to call system/Physically safe environment - no spills, clutter or unnecessary equipment/Purposeful proactive rounding/Room/bathroom lighting operational, light cord in reach
Bed/Stretcher in lowest position, wheels locked, appropriate side rails in place/Call bell, personal items and telephone in reach/Instruct patient to call for assistance before getting out of bed/chair/stretcher/Non-slip footwear applied when patient is off stretcher/[place] to call system/Physically safe environment - no spills, clutter or unnecessary equipment/Purposeful proactive rounding/Room/bathroom lighting operational, light cord in reach

## 2023-06-24 NOTE — BH CONSULTATION LIAISON ASSESSMENT NOTE - DETAILS
pt admitted to 4 s/p suicide attempt  per chart review, rash w/ zyprexa pt with recent self-inflicted laceration to the abdomen in the setting of CAH

## 2023-06-24 NOTE — DISCHARGE NOTE PROVIDER - NSDCMRMEDTOKEN_GEN_ALL_CORE_FT
atorvastatin 10 mg oral tablet: 1 tab(s) orally once a day (at bedtime)  divalproex sodium 500 mg oral delayed release tablet: 1 tab(s) orally 2 times a day  ferrous sulfate 325 mg (65 mg elemental iron) oral tablet: 1 tab(s) orally once a day (at bedtime)  gabapentin 400 mg oral capsule: 1 cap(s) orally 2 times a day  guanFACINE 2 mg oral tablet: 1 tab(s) orally once a day  levothyroxine 50 mcg (0.05 mg) oral tablet: 1 tab(s) orally once a day  loratadine 10 mg oral tablet: 1 tab(s) orally once a day  Melatonin 5 mg oral tablet: 1 tab(s) orally once a day (at bedtime)  metFORMIN 1000 mg oral tablet: 1 tab(s) orally 2 times a day (with meals)  mirtazapine 15 mg oral tablet: 1 tab(s) orally once a day (at bedtime)  pantoprazole 40 mg oral delayed release tablet: 1 tab(s) orally once a day  risperiDONE 4 mg oral tablet: 1 tab(s) orally 2 times a day  senna leaf extract oral tablet: 2 tab(s) orally once a day (at bedtime)  tamsulosin 0.4 mg oral capsule: 1 cap(s) orally once a day (at bedtime)   atorvastatin 10 mg oral tablet: 1 tab(s) orally once a day (at bedtime)  bacitracin 500 units/g topical ointment: 1 Apply topically to affected area 2 times a day  clonazePAM 1 mg oral tablet: 1 tab(s) orally once a day  divalproex sodium 500 mg oral delayed release tablet: 3 tab(s) orally once a day (at bedtime)  ferrous sulfate 325 mg (65 mg elemental iron) oral tablet: 1 tab(s) orally once a day (at bedtime)  gabapentin 400 mg oral capsule: 1 cap(s) orally 2 times a day  guaiFENesin 100 mg/5 mL oral liquid: 10 milliliter(s) orally every 6 hours As needed Cough  guanFACINE 2 mg oral tablet: 1 tab(s) orally once a day  levothyroxine 50 mcg (0.05 mg) oral tablet: 1 tab(s) orally once a day  loratadine 10 mg oral tablet: 1 tab(s) orally once a day  Melatonin 5 mg oral tablet: 1 tab(s) orally once a day (at bedtime)  metFORMIN 1000 mg oral tablet: 1 tab(s) orally 2 times a day (with meals)  mirtazapine 30 mg oral tablet: 1 tab(s) orally once a day (at bedtime)  pantoprazole 40 mg oral delayed release tablet: 1 tab(s) orally once a day  risperiDONE 4 mg oral tablet: 1 tab(s) orally 2 times a day  senna leaf extract oral tablet: 2 tab(s) orally once a day (at bedtime)  tamsulosin 0.4 mg oral capsule: 1 cap(s) orally once a day (at bedtime)
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
## 2023-06-24 NOTE — BH CONSULTATION LIAISON ASSESSMENT NOTE - OTHER PAST PSYCHIATRIC HISTORY (INCLUDE DETAILS REGARDING ONSET, COURSE OF ILLNESS, INPATIENT/OUTPATIENT TREATMENT)
Primary Dx MDD (major depressive disorder), recurrent severe, without psychosis F33.2. Secondary Dx 1 Autism spectrum disorder F84.0. Secondary Dx 2 Attention deficit hyperactivity disorder (ADHD), combined type F90.2.   hx of multiple inpatient psych admissions, currently on ML4

## 2023-06-24 NOTE — DISCHARGE NOTE PROVIDER - NSDCCPCAREPLAN_GEN_ALL_CORE_FT
PRINCIPAL DISCHARGE DIAGNOSIS  Diagnosis: Pneumonia  Assessment and Plan of Treatment: You were found to have pneumonia after imagin, also consistent with your cough and lethargy symptoms. We gave you antibiotics to treat your pneumonia.   If you develop nausea, vomiting, dizziness, weakness, SOB, chest pain please go to the ED.      SECONDARY DISCHARGE DIAGNOSES  Diagnosis: MDD (major depressive disorder)  Assessment and Plan of Treatment: You have Major Depressive Disorder, as diagnosed and treated at [facility]. You have an established medication regimen as prescribed by the Psychiatrists to help treat this condition.  If you develop sudden thoughts to harm yourself, hear random voices, and see things others do not see, please call your Psychiatrist or go to the ED.     PRINCIPAL DISCHARGE DIAGNOSIS  Diagnosis: Pneumonia  Assessment and Plan of Treatment: You were found to have pneumonia after imaging, also consistent with your cough and lethargy symptoms. We gave you antibiotics to treat your pneumonia.   If you start to develop issues with your breathing, cough, fevers or chills please go to the ED.      SECONDARY DISCHARGE DIAGNOSES  Diagnosis: MDD (major depressive disorder)  Assessment and Plan of Treatment: You have Major Depressive Disorder, as diagnosed and treated at [facility]. You have an established medication regimen as prescribed by the Psychiatrists to help treat this condition. Please continue to take all your medications as prescribed.   If you develop sudden thoughts to harm yourself, hear random voices, and see things others do not see, please call your Psychiatrist or go to the ED.     PRINCIPAL DISCHARGE DIAGNOSIS  Diagnosis: Pneumonia  Assessment and Plan of Treatment: You came into the hospital for lethagy. You also reported a productive cough. We did a CT of your chest that showed evidence of pneumonia, consistent wiht your cough and lethargy. We treated you with IV antibiotics and you had improvement in your condition. You did not require supplemental oxygen during your stay.   Please return to the hospital if you experience fever, chills, severe headache, dizziness, lightheadedness, loss of consciousness, visual disturbance, chest pain, palpitations, shortness of breath,  abdominal pain, nausea, vomiting, diarrhea, blood in your vomit/stool/urine, burning with urination or change in urinary pattern, numbness, weakness, tingling, or other alarming symptoms.  You were found to have pneumonia after imaging, also consistent with your cough and lethargy symptoms.         SECONDARY DISCHARGE DIAGNOSES  Diagnosis: MDD (major depressive disorder)  Assessment and Plan of Treatment: You have Major Depressive Disorder, as diagnosed and treated at [facility]. You have an established medication regimen as prescribed by the Psychiatrists to help treat this condition. Please continue to take all your medications as prescribed.   If you develop sudden thoughts to harm yourself, hear random voices, and see things others do not see, please call your Psychiatrist or go to the ED.

## 2023-06-24 NOTE — PROGRESS NOTE ADULT - PROBLEM SELECTOR PLAN 1
- pt with 2-3 days of cough with yellow sputum, sore throat, and runny nose   - procal elevated, leukocytosis noted   - CT with right lower lobe airspace opacity new since the previous exam. This could represent infection.   - check blood, sputum cultures   - start ceftriaxone and azithromycin - pt with 2-3 days of cough with yellow sputum, sore throat, and runny nose   - procal elevated, leukocytosis noted   - CT with right lower lobe airspace opacity new since the previous exam. This could represent infection.   - check blood, sputum cultures   - start ceftriaxone (6/23-)  - d/c azithromycin as ur legionella ag negative

## 2023-06-24 NOTE — BH CONSULTATION LIAISON ASSESSMENT NOTE - NSBHCHARTREVIEWVS_PSY_A_CORE FT
Vital Signs Last 24 Hrs  T(C): 36.4 (24 Jun 2023 06:20), Max: 36.9 (23 Jun 2023 12:00)  T(F): 97.5 (24 Jun 2023 06:20), Max: 98.4 (23 Jun 2023 12:00)  HR: 78 (24 Jun 2023 06:20) (62 - 88)  BP: 107/68 (24 Jun 2023 06:20) (96/72 - 142/90)  BP(mean): --  RR: 19 (24 Jun 2023 06:20) (17 - 20)  SpO2: 97% (24 Jun 2023 06:20) (97% - 100%)    Parameters below as of 24 Jun 2023 06:20  Patient On (Oxygen Delivery Method): room air

## 2023-06-24 NOTE — PATIENT PROFILE ADULT - FALL HARM RISK - RISK INTERVENTIONS

## 2023-06-24 NOTE — DISCHARGE NOTE PROVIDER - HOSPITAL COURSE
HPI:  33 year old male comes into the ED from [facility] (originally from [facility]) for lethargy ongoing for 2-3 days. Patient presented to [facility] about 1 week prior. In the ED, patient found to have CT findings concerning for PNA in the RLL.    Patient started on antibiotics for this pneumonia. He rapidly improved and remained on room air with intact mental status for the rest of his admission.    Patient was also seen by Psychiatry for his MDD with auditory hallucinations. His established medication regimen was started and continued.    On the day of discharge, the patient was in stable clinical and symptomatic state. HPI:  33 year old male comes into the ED from [facility] (originally from [facility]) for lethargy ongoing for 2-3 days. Patient presented to [facility] about 1 week prior. In the ED, patient found to have CT findings concerning for PNA in the RLL. Patient finished a 5-day course of IV Ceftriaxone. He rapidly improved and remained on room air with intact mental status for the rest of his admission.  Patient was also seen by Psychiatry for his MDD with auditory hallucinations. His established medication regimen was started and continued. On the day of discharge, the patient was in stable clinical and symptomatic state. 33 year old male  PMHx of Autism Spectrum Disorder, Asthma, HLD, GERD, Hypothyroid, Impulse control disorder, Sun sensitivity, and BPH, from [facility] (originally from [facility]) for lethargy ongoing for 2-3 days, here for management of pneumonia. Patient presented to [facility] about 1 week prior. In the ED, patient was found to have Ct findings concerning for pneumonia in the RLL. Patient was treated with a 5 day total course of antibiotics. He had rapid improvement and was on room air throughout his admission. Patient was seen by psychiatry for his MDD with psychotic features. His medication regimen was started and continued. Patient seen by psychiatry inpatient and recommended to return to [facility]. Patient is medically optimized for discharge to inpatient psychiatric facility.      33 year old male  PMHx of Autism Spectrum Disorder, Asthma, HLD, GERD, Hypothyroid, Impulse control disorder, Sun sensitivity, and BPH, [facility]) for lethargy ongoing for 2-3 days, here for management of pneumonia. Patient presented to Health system about 1 week prior. In the ED, patient was found to have Ct findings concerning for pneumonia in the RLL. Patient was treated with a 5 day total course of antibiotics. He had rapid improvement and was on room air throughout his admission. Patient was seen by psychiatry for his MDD with psychotic features. His medication regimen was started and continued. Patient seen by psychiatry inpatient and recommended to return to [facility]. Patient is medically optimized for discharge to inpatient psychiatric facility.     Adrianne Regan MD  Medicine Attending  Teams preferred/Pager: [phone]    I have personally seen and examined patient. I discussed the case with Dr. York and agree with findings and plan as detailed per note above. in brief this is a 32 y/o M with ASD, impulse control, asthma admitted for difficulty breathing, found to have pneumonia. Pt is now s/p course of antibiotics. Pt is medically stable for transfer back to Health system where he was admitted for self harm. With regards to the healing laceration on abdomen and right arm, ok to put bacitracin as needed to heal. stable for discharge.

## 2023-06-24 NOTE — PROGRESS NOTE ADULT - SUBJECTIVE AND OBJECTIVE BOX
INTERVAL HPI/OVERNIGHT EVENTS:    Patient was seen and examined at bedside. As per nurse and patient, no o/n events, patient resting comfortably. No complaints at this time. Patient denies: fever, chills, dizziness, weakness, HA, CP, palpitations, SOB, cough, N/V/D/C, dysuria, changes in bowel movements, LE edema.    ROS: as above    VITAL SIGNS:  T(F): 97.5 (23 @ 06:20)  HR: 78 (23 @ 06:20)  BP: 107/68 (23 @ 06:20)  RR: 19 (23 @ 06:20)  SpO2: 97% (23 @ 06:20)  Wt(kg): --    PHYSICAL EXAM:    Constitutional: resting confortably, in no acute distress  Eyes: PERRL, sclera non-icteric  Neck: supple, trachea midline, no masses, no JVD  Respiratory: CTA b/l, good air entry b/l, no wheezing, no rhonchi, no rales, without accessory muscle use and no intercostal retractions  Cardiovascular: RRR, normal S1S2, no M/R/G  Gastrointestinal: soft, NTND, no masses palpable, BS normal  Extremities: Warm, well perfused, pulses equal bilateral upper and lower extremities, no edema, no clubbing  Neurological: AAOx3, CN Grossly intact  Skin: Normal temperature, warm, dry    MEDICATIONS  (STANDING):  atorvastatin 10 milliGRAM(s) Oral at bedtime  azithromycin  IVPB 500 milliGRAM(s) IV Intermittent every 24 hours  azithromycin  IVPB      cefTRIAXone   IVPB 1000 milliGRAM(s) IV Intermittent every 24 hours  clonazePAM  Tablet 1 milliGRAM(s) Oral daily  dextrose 5%. 1000 milliLiter(s) (100 mL/Hr) IV Continuous <Continuous>  dextrose 5%. 1000 milliLiter(s) (50 mL/Hr) IV Continuous <Continuous>  dextrose 50% Injectable 25 Gram(s) IV Push once  dextrose 50% Injectable 25 Gram(s) IV Push once  dextrose 50% Injectable 12.5 Gram(s) IV Push once  divalproex DR 1500 milliGRAM(s) Oral at bedtime  enoxaparin Injectable 40 milliGRAM(s) SubCutaneous every 12 hours  gabapentin 400 milliGRAM(s) Oral three times a day  glucagon  Injectable 1 milliGRAM(s) IntraMuscular once  insulin lispro (ADMELOG) corrective regimen sliding scale   SubCutaneous three times a day before meals  lactated ringers. 1000 milliLiter(s) (100 mL/Hr) IV Continuous <Continuous>  levothyroxine 50 MICROGram(s) Oral daily  loratadine 10 milliGRAM(s) Oral daily  mirtazapine 30 milliGRAM(s) Oral at bedtime  pantoprazole    Tablet 40 milliGRAM(s) Oral before breakfast  risperiDONE   Tablet 4 milliGRAM(s) Oral two times a day  senna 2 Tablet(s) Oral at bedtime  tamsulosin 0.4 milliGRAM(s) Oral at bedtime    MEDICATIONS  (PRN):  acetaminophen     Tablet .. 650 milliGRAM(s) Oral every 6 hours PRN Temp greater or equal to 38C (100.4F), Mild Pain (1 - 3)  aluminum hydroxide/magnesium hydroxide/simethicone Suspension 30 milliLiter(s) Oral every 4 hours PRN Dyspepsia  benzocaine/menthol Lozenge 1 Lozenge Oral three times a day PRN Sore Throat  dextrose Oral Gel 15 Gram(s) Oral once PRN Blood Glucose LESS THAN 70 milliGRAM(s)/deciliter  diphenhydrAMINE 50 milliGRAM(s) Oral every 6 hours PRN Assaultive behavior  guaiFENesin Oral Liquid (Sugar-Free) 200 milliGRAM(s) Oral every 6 hours PRN Cough  haloperidol     Tablet 5 milliGRAM(s) Oral every 6 hours PRN agitation  LORazepam     Tablet 2 milliGRAM(s) Oral every 6 hours PRN Agitation  melatonin 3 milliGRAM(s) Oral at bedtime PRN Insomnia  ondansetron Injectable 4 milliGRAM(s) IV Push every 8 hours PRN Nausea and/or Vomiting      Allergies    Haldol (Rash)  Zyprexa (Dystonic RXN)  Zyprexa (Unknown)  Haldol (Other)  Bee stings (Unknown)    Intolerances        LABS:                        13.7   12.69 )-----------( 179      ( 2023 01:12 )             43.6     06-    134<L>  |  96<L>  |  14  ----------------------------<  133<H>  4.9   |  27  |  1.06    Ca    9.0      2023 01:12  Mg     1.90         TPro  6.4  /  Alb  3.8  /  TBili  0.4  /  DBili  x   /  AST  21  /  ALT  56<H>  /  AlkPhos  92        Urinalysis Basic - ( 2023 06:17 )    Color: Light Yellow / Appearance: Clear / S.032 / pH: x  Gluc: x / Ketone: Negative  / Bili: Negative / Urobili: <2 mg/dL   Blood: x / Protein: Negative / Nitrite: Negative   Leuk Esterase: Negative / RBC: x / WBC x   Sq Epi: x / Non Sq Epi: x / Bacteria: x        RADIOLOGY & ADDITIONAL TESTS:

## 2023-06-24 NOTE — CHART NOTE - NSCHARTNOTEFT_GEN_A_CORE
Notified by RN pt agitated and saying he wants to kill himself. Tried to rip out IV and scratching himself. Shortly after, pt reporting substernal chest pain. Was going to receive ativan however was able to be verbally redirected.    Pt seen and examined at bedside. Pt reports substernal CP, stabbing, 5/10, radiating to L side, started about 30 minutes ago. On arrival to room, pt in NAD, snoring, resting comfortably in bed. VSS. Cardiac exam unrevealing. Abdominal exam with 4 cm laceration to right side of abdomen. EKG ordered and reviewed, NSR with no ischemic changes. Will manage pain conservatively with tylenol and CTM for agitation, with ativan prn if unable to be verbally redirected.

## 2023-06-24 NOTE — BH CONSULTATION LIAISON ASSESSMENT NOTE - NSBHATTESTCOMMENTATTENDFT_PSY_A_CORE
Chart reviewed. I personally saw and examined this patient using video platform. Dr. Henderson was the presenter. Dr. Henderson saw and examined the patient in person. I agree with Dr. Henderson' history, mental status exam, and A/P with below additions.  Patient is alert, cooperative, knows he is in a hospital. Denies SI currently. No AH/VH elicited. Appears future oriented. Affect is mostly euthymic. His thought process is goal directed. Does not appear internally preoccupied at the moment. Has full range of motion in UE.    Agree with plan per above. guanfacine ER is likely non-formulary. Primary team to call pharmacy to assist in starting medication.  Call with questions- otherwise continue 1:1 through the weekend and Psych CL to formally follow back up on Monday.

## 2023-06-24 NOTE — PROGRESS NOTE ADULT - ASSESSMENT
33 year old male comes into the ED from [facility] (originally from [facility]) for lethargy ongoing for 2-3 days being treated for pneumonia.

## 2023-06-25 LAB
CULTURE RESULTS: SIGNIFICANT CHANGE UP
GLUCOSE BLDC GLUCOMTR-MCNC: 138 MG/DL — HIGH (ref 70–99)
GLUCOSE BLDC GLUCOMTR-MCNC: 171 MG/DL — HIGH (ref 70–99)
GLUCOSE BLDC GLUCOMTR-MCNC: 172 MG/DL — HIGH (ref 70–99)
GLUCOSE BLDC GLUCOMTR-MCNC: 194 MG/DL — HIGH (ref 70–99)
SPECIMEN SOURCE: SIGNIFICANT CHANGE UP

## 2023-06-25 PROCEDURE — 99232 SBSQ HOSP IP/OBS MODERATE 35: CPT

## 2023-06-25 RX ADMIN — RISPERIDONE 4 MILLIGRAM(S): 4 TABLET ORAL at 17:22

## 2023-06-25 RX ADMIN — MIRTAZAPINE 30 MILLIGRAM(S): 45 TABLET, ORALLY DISINTEGRATING ORAL at 21:39

## 2023-06-25 RX ADMIN — DIVALPROEX SODIUM 1500 MILLIGRAM(S): 500 TABLET, DELAYED RELEASE ORAL at 21:38

## 2023-06-25 RX ADMIN — Medication 1 MILLIGRAM(S): at 13:15

## 2023-06-25 RX ADMIN — GABAPENTIN 400 MILLIGRAM(S): 400 CAPSULE ORAL at 17:21

## 2023-06-25 RX ADMIN — TAMSULOSIN HYDROCHLORIDE 0.4 MILLIGRAM(S): 0.4 CAPSULE ORAL at 21:40

## 2023-06-25 RX ADMIN — ATORVASTATIN CALCIUM 10 MILLIGRAM(S): 80 TABLET, FILM COATED ORAL at 21:40

## 2023-06-25 RX ADMIN — ENOXAPARIN SODIUM 40 MILLIGRAM(S): 100 INJECTION SUBCUTANEOUS at 10:07

## 2023-06-25 RX ADMIN — CEFTRIAXONE 100 MILLIGRAM(S): 500 INJECTION, POWDER, FOR SOLUTION INTRAMUSCULAR; INTRAVENOUS at 00:59

## 2023-06-25 RX ADMIN — GABAPENTIN 400 MILLIGRAM(S): 400 CAPSULE ORAL at 21:38

## 2023-06-25 RX ADMIN — Medication 1: at 13:15

## 2023-06-25 RX ADMIN — LORATADINE 10 MILLIGRAM(S): 10 TABLET ORAL at 12:48

## 2023-06-25 RX ADMIN — ENOXAPARIN SODIUM 40 MILLIGRAM(S): 100 INJECTION SUBCUTANEOUS at 21:39

## 2023-06-25 NOTE — PROGRESS NOTE ADULT - SUBJECTIVE AND OBJECTIVE BOX
PROGRESS NOTE:   Authored by Rey York MD  Patient is a 33y old  Male who presents with a chief complaint of Lethargy (24 Jun 2023 16:59)      SUBJECTIVE / OVERNIGHT EVENTS:  No acute events overnight.     ADDITIONAL REVIEW OF SYSTEMS:    MEDICATIONS  (STANDING):  atorvastatin 10 milliGRAM(s) Oral at bedtime  cefTRIAXone   IVPB 1000 milliGRAM(s) IV Intermittent every 24 hours  clonazePAM  Tablet 1 milliGRAM(s) Oral daily  dextrose 5%. 1000 milliLiter(s) (100 mL/Hr) IV Continuous <Continuous>  dextrose 5%. 1000 milliLiter(s) (50 mL/Hr) IV Continuous <Continuous>  dextrose 50% Injectable 25 Gram(s) IV Push once  dextrose 50% Injectable 25 Gram(s) IV Push once  dextrose 50% Injectable 12.5 Gram(s) IV Push once  divalproex DR 1500 milliGRAM(s) Oral at bedtime  enoxaparin Injectable 40 milliGRAM(s) SubCutaneous every 12 hours  gabapentin 400 milliGRAM(s) Oral three times a day  glucagon  Injectable 1 milliGRAM(s) IntraMuscular once  insulin lispro (ADMELOG) corrective regimen sliding scale   SubCutaneous three times a day before meals  lactated ringers. 1000 milliLiter(s) (100 mL/Hr) IV Continuous <Continuous>  levothyroxine 50 MICROGram(s) Oral daily  loratadine 10 milliGRAM(s) Oral daily  mirtazapine 30 milliGRAM(s) Oral at bedtime  pantoprazole    Tablet 40 milliGRAM(s) Oral before breakfast  risperiDONE   Tablet 4 milliGRAM(s) Oral two times a day  senna 2 Tablet(s) Oral at bedtime  tamsulosin 0.4 milliGRAM(s) Oral at bedtime    MEDICATIONS  (PRN):  acetaminophen     Tablet .. 650 milliGRAM(s) Oral every 6 hours PRN Temp greater or equal to 38C (100.4F), Mild Pain (1 - 3)  aluminum hydroxide/magnesium hydroxide/simethicone Suspension 30 milliLiter(s) Oral every 4 hours PRN Dyspepsia  benzocaine/menthol Lozenge 1 Lozenge Oral three times a day PRN Sore Throat  chlorproMAZINE    Injectable 50 milliGRAM(s) IntraMuscular every 6 hours PRN if not tolerating PO  chlorproMAZINE    Tablet 50 milliGRAM(s) Oral every 6 hours PRN agitation  dextrose Oral Gel 15 Gram(s) Oral once PRN Blood Glucose LESS THAN 70 milliGRAM(s)/deciliter  guaiFENesin Oral Liquid (Sugar-Free) 200 milliGRAM(s) Oral every 6 hours PRN Cough  LORazepam     Tablet 2 milliGRAM(s) Oral every 6 hours PRN Agitation  LORazepam   Injectable 2 milliGRAM(s) IntraMuscular every 6 hours PRN if not tolerating PO  melatonin 3 milliGRAM(s) Oral at bedtime PRN Insomnia  ondansetron Injectable 4 milliGRAM(s) IV Push every 8 hours PRN Nausea and/or Vomiting      CAPILLARY BLOOD GLUCOSE      POCT Blood Glucose.: 183 mg/dL (24 Jun 2023 17:39)  POCT Blood Glucose.: 111 mg/dL (24 Jun 2023 13:11)  POCT Blood Glucose.: 106 mg/dL (24 Jun 2023 09:14)    I&O's Summary      PHYSICAL EXAM:  Vital Signs Last 24 Hrs  T(C): 37.1 (25 Jun 2023 06:20), Max: 37.1 (25 Jun 2023 06:20)  T(F): 98.7 (25 Jun 2023 06:20), Max: 98.7 (25 Jun 2023 06:20)  HR: 82 (25 Jun 2023 06:20) (82 - 99)  BP: 114/74 (25 Jun 2023 06:20) (114/74 - 131/82)  BP(mean): --  RR: 18 (25 Jun 2023 06:20) (18 - 19)  SpO2: 97% (25 Jun 2023 06:20) (97% - 100%)    Parameters below as of 25 Jun 2023 06:20  Patient On (Oxygen Delivery Method): room air        GENERAL: No apparent distress.   HEAD:  Atraumatic, Normocephalic  EYES: EOMI, PERRLA, conjunctiva and sclera clear  NECK: Supple, no lymphadenopathy, no elevated JVP  CHEST/LUNG: Clear to auscultation bilateral and symmetric; No wheezes, rales, or rhonchi  HEART: S1 and S2 normal. Regular rate and rhythm; No murmurs, rubs, or gallops  ABDOMEN: Soft, non-tender, non-distended; normal bowel sounds  EXTREMITIES:  2+ peripheral pulses b/l, No clubbing, cyanosis, or edema  NEUROLOGY: A&O x 3, no focal deficits  SKIN: No rashes or lesions    LABS:                        13.4   8.17  )-----------( 174      ( 24 Jun 2023 06:10 )             42.5     06-24    139  |  104  |  9   ----------------------------<  162<H>  4.2   |  23  |  0.83    Ca    8.7      24 Jun 2023 06:10    TPro  6.0  /  Alb  3.2<L>  /  TBili  0.2  /  DBili  x   /  AST  30  /  ALT  55<H>  /  AlkPhos  87  06-24          Urinalysis Basic - ( 24 Jun 2023 06:10 )    Color: x / Appearance: x / SG: x / pH: x  Gluc: 162 mg/dL / Ketone: x  / Bili: x / Urobili: x   Blood: x / Protein: x / Nitrite: x   Leuk Esterase: x / RBC: x / WBC x   Sq Epi: x / Non Sq Epi: x / Bacteria: x          RADIOLOGY & ADDITIONAL TESTS:  Lab Results Reviewed   Imaging Reviewed  Electrocardiogram Reviewed   PROGRESS NOTE:   Authored by Rey York MD  Patient is a 33y old  Male who presents with a chief complaint of Lethargy (24 Jun 2023 16:59)      SUBJECTIVE / OVERNIGHT EVENTS:  Patient was seen and examined at bedside. Overnight patient became restless and agitated, causing his to scratch himself and open a previous laceration. He also endorsed chest pain and an EKG did not demonstrate any signs of ischemic changes. This AM patient was seen resting comfortably in bed. He states that he is feeling better and denies any chest pain or dyspnea.     ADDITIONAL REVIEW OF SYSTEMS: Patient denies subjective fevers or chills, hematuria, dysuria, constipation or diarrhea.     MEDICATIONS  (STANDING):  atorvastatin 10 milliGRAM(s) Oral at bedtime  cefTRIAXone   IVPB 1000 milliGRAM(s) IV Intermittent every 24 hours  clonazePAM  Tablet 1 milliGRAM(s) Oral daily  dextrose 5%. 1000 milliLiter(s) (100 mL/Hr) IV Continuous <Continuous>  dextrose 5%. 1000 milliLiter(s) (50 mL/Hr) IV Continuous <Continuous>  dextrose 50% Injectable 25 Gram(s) IV Push once  dextrose 50% Injectable 25 Gram(s) IV Push once  dextrose 50% Injectable 12.5 Gram(s) IV Push once  divalproex DR 1500 milliGRAM(s) Oral at bedtime  enoxaparin Injectable 40 milliGRAM(s) SubCutaneous every 12 hours  gabapentin 400 milliGRAM(s) Oral three times a day  glucagon  Injectable 1 milliGRAM(s) IntraMuscular once  insulin lispro (ADMELOG) corrective regimen sliding scale   SubCutaneous three times a day before meals  lactated ringers. 1000 milliLiter(s) (100 mL/Hr) IV Continuous <Continuous>  levothyroxine 50 MICROGram(s) Oral daily  loratadine 10 milliGRAM(s) Oral daily  mirtazapine 30 milliGRAM(s) Oral at bedtime  pantoprazole    Tablet 40 milliGRAM(s) Oral before breakfast  risperiDONE   Tablet 4 milliGRAM(s) Oral two times a day  senna 2 Tablet(s) Oral at bedtime  tamsulosin 0.4 milliGRAM(s) Oral at bedtime    MEDICATIONS  (PRN):  acetaminophen     Tablet .. 650 milliGRAM(s) Oral every 6 hours PRN Temp greater or equal to 38C (100.4F), Mild Pain (1 - 3)  aluminum hydroxide/magnesium hydroxide/simethicone Suspension 30 milliLiter(s) Oral every 4 hours PRN Dyspepsia  benzocaine/menthol Lozenge 1 Lozenge Oral three times a day PRN Sore Throat  chlorproMAZINE    Injectable 50 milliGRAM(s) IntraMuscular every 6 hours PRN if not tolerating PO  chlorproMAZINE    Tablet 50 milliGRAM(s) Oral every 6 hours PRN agitation  dextrose Oral Gel 15 Gram(s) Oral once PRN Blood Glucose LESS THAN 70 milliGRAM(s)/deciliter  guaiFENesin Oral Liquid (Sugar-Free) 200 milliGRAM(s) Oral every 6 hours PRN Cough  LORazepam     Tablet 2 milliGRAM(s) Oral every 6 hours PRN Agitation  LORazepam   Injectable 2 milliGRAM(s) IntraMuscular every 6 hours PRN if not tolerating PO  melatonin 3 milliGRAM(s) Oral at bedtime PRN Insomnia  ondansetron Injectable 4 milliGRAM(s) IV Push every 8 hours PRN Nausea and/or Vomiting      CAPILLARY BLOOD GLUCOSE      POCT Blood Glucose.: 183 mg/dL (24 Jun 2023 17:39)  POCT Blood Glucose.: 111 mg/dL (24 Jun 2023 13:11)  POCT Blood Glucose.: 106 mg/dL (24 Jun 2023 09:14)    I&O's Summary      PHYSICAL EXAM:  Vital Signs Last 24 Hrs  T(C): 37.1 (25 Jun 2023 06:20), Max: 37.1 (25 Jun 2023 06:20)  T(F): 98.7 (25 Jun 2023 06:20), Max: 98.7 (25 Jun 2023 06:20)  HR: 82 (25 Jun 2023 06:20) (82 - 99)  BP: 114/74 (25 Jun 2023 06:20) (114/74 - 131/82)  BP(mean): --  RR: 18 (25 Jun 2023 06:20) (18 - 19)  SpO2: 97% (25 Jun 2023 06:20) (97% - 100%)    Parameters below as of 25 Jun 2023 06:20  Patient On (Oxygen Delivery Method): room air        GENERAL: No apparent distress.   HEAD:  Atraumatic, Normocephalic  EYES: EOMI, PERRLA, conjunctiva and sclera clear  NECK: Supple, no lymphadenopathy, no elevated JVP  CHEST/LUNG: Clear to auscultation bilateral and symmetric; No wheezes, rales, or rhonchi  HEART: S1 and S2 normal. Regular rate and rhythm; No murmurs, rubs, or gallops  ABDOMEN: Soft, non-tender, non-distended; normal bowel sounds. 4cm laceration on RUQ. Well circumscribed no signs of discharge or infection   EXTREMITIES:  2+ peripheral pulses b/l, No clubbing, cyanosis, or edema  NEUROLOGY: A&O x 3, no focal deficits  SKIN: No rashes or lesions    LABS:                        13.4   8.17  )-----------( 174      ( 24 Jun 2023 06:10 )             42.5     06-24    139  |  104  |  9   ----------------------------<  162<H>  4.2   |  23  |  0.83    Ca    8.7      24 Jun 2023 06:10    TPro  6.0  /  Alb  3.2<L>  /  TBili  0.2  /  DBili  x   /  AST  30  /  ALT  55<H>  /  AlkPhos  87  06-24          Urinalysis Basic - ( 24 Jun 2023 06:10 )    Color: x / Appearance: x / SG: x / pH: x  Gluc: 162 mg/dL / Ketone: x  / Bili: x / Urobili: x   Blood: x / Protein: x / Nitrite: x   Leuk Esterase: x / RBC: x / WBC x   Sq Epi: x / Non Sq Epi: x / Bacteria: x          RADIOLOGY & ADDITIONAL TESTS:  Lab Results Reviewed   Imaging Reviewed  Electrocardiogram Reviewed

## 2023-06-25 NOTE — PROGRESS NOTE ADULT - PROBLEM SELECTOR PLAN 1
- pt with 2-3 days of cough with yellow sputum, sore throat, and runny nose   - procal elevated, leukocytosis noted   - CT with right lower lobe airspace opacity new since the previous exam. This could represent infection.   - check blood, sputum cultures   - start ceftriaxone (6/23-)  - d/c azithromycin as ur legionella ag negative - pt with 2-3 days of cough with yellow sputum, sore throat, and runny nose   - procal elevated, leukocytosis noted   - CT with right lower lobe airspace opacity new since the previous exam. This could represent infection.   - Continue ceftriaxone (6/23-)  -Upon discharge, will transition to PO antibiotics.

## 2023-06-25 NOTE — PROGRESS NOTE ADULT - PROBLEM SELECTOR PLAN 2
-  consult   - continue psych meds   - Depakote 1500mg qhs   - Intuniv ER 4mg,   - Risperdal 4mg BID,   - Remeron 30mg qhs.   - begin to taper  klonopin 1mg qd from TID due to over sedation  - Gabapentin 400mgTID  - ativan 2mg PO/IM/IV q6h PRN  - thorazine 50mg PO/IV q6h PRN

## 2023-06-25 NOTE — PROVIDER CONTACT NOTE (OTHER) - ACTION/TREATMENT ORDERED:
MD Kade Rodriguez notified and made aware. EKG ordered and done as per md orders. MD Kade Rodriguez notified and made aware. MD came to assess patient at bedside. EKG ordered and done as per md orders.

## 2023-06-25 NOTE — PROGRESS NOTE ADULT - ASSESSMENT
33 year old male comes into the ED from [facility] (originally from [facility]) for lethargy ongoing for 2-3 days being treated for pneumonia.  33 year old male comes into the ED from [facility] (originally from [facility]) for lethargy ongoing for 2-3 days being treated for pneumonia. Patients clinical presentation continues to improve.

## 2023-06-25 NOTE — PROGRESS NOTE ADULT - PROBLEM SELECTOR PLAN 4
- DVT ppx: lovenox   - dispo per psych - DVT ppx: lovenox   - dispo: [facility] upon discharge  Diet: Consistent carbs

## 2023-06-26 LAB
GLUCOSE BLDC GLUCOMTR-MCNC: 156 MG/DL — HIGH (ref 70–99)
GLUCOSE BLDC GLUCOMTR-MCNC: 179 MG/DL — HIGH (ref 70–99)
GLUCOSE BLDC GLUCOMTR-MCNC: 182 MG/DL — HIGH (ref 70–99)
GLUCOSE BLDC GLUCOMTR-MCNC: 193 MG/DL — HIGH (ref 70–99)
SARS-COV-2 RNA SPEC QL NAA+PROBE: SIGNIFICANT CHANGE UP

## 2023-06-26 PROCEDURE — 99232 SBSQ HOSP IP/OBS MODERATE 35: CPT

## 2023-06-26 PROCEDURE — 99232 SBSQ HOSP IP/OBS MODERATE 35: CPT | Mod: GC

## 2023-06-26 RX ADMIN — LORATADINE 10 MILLIGRAM(S): 10 TABLET ORAL at 12:21

## 2023-06-26 RX ADMIN — Medication 1 MILLIGRAM(S): at 12:22

## 2023-06-26 RX ADMIN — Medication 200 MILLIGRAM(S): at 00:05

## 2023-06-26 RX ADMIN — ENOXAPARIN SODIUM 40 MILLIGRAM(S): 100 INJECTION SUBCUTANEOUS at 21:41

## 2023-06-26 RX ADMIN — CEFTRIAXONE 100 MILLIGRAM(S): 500 INJECTION, POWDER, FOR SOLUTION INTRAMUSCULAR; INTRAVENOUS at 00:05

## 2023-06-26 RX ADMIN — SENNA PLUS 2 TABLET(S): 8.6 TABLET ORAL at 21:40

## 2023-06-26 RX ADMIN — ATORVASTATIN CALCIUM 10 MILLIGRAM(S): 80 TABLET, FILM COATED ORAL at 21:40

## 2023-06-26 RX ADMIN — Medication 1: at 08:58

## 2023-06-26 RX ADMIN — Medication 1: at 18:08

## 2023-06-26 RX ADMIN — ENOXAPARIN SODIUM 40 MILLIGRAM(S): 100 INJECTION SUBCUTANEOUS at 09:02

## 2023-06-26 RX ADMIN — DIVALPROEX SODIUM 1500 MILLIGRAM(S): 500 TABLET, DELAYED RELEASE ORAL at 21:40

## 2023-06-26 RX ADMIN — GABAPENTIN 400 MILLIGRAM(S): 400 CAPSULE ORAL at 21:39

## 2023-06-26 RX ADMIN — Medication 1: at 12:21

## 2023-06-26 RX ADMIN — MIRTAZAPINE 30 MILLIGRAM(S): 45 TABLET, ORALLY DISINTEGRATING ORAL at 21:40

## 2023-06-26 RX ADMIN — Medication 200 MILLIGRAM(S): at 12:30

## 2023-06-26 RX ADMIN — RISPERIDONE 4 MILLIGRAM(S): 4 TABLET ORAL at 18:08

## 2023-06-26 RX ADMIN — GABAPENTIN 400 MILLIGRAM(S): 400 CAPSULE ORAL at 15:21

## 2023-06-26 RX ADMIN — TAMSULOSIN HYDROCHLORIDE 0.4 MILLIGRAM(S): 0.4 CAPSULE ORAL at 21:39

## 2023-06-26 NOTE — PROGRESS NOTE ADULT - PROBLEM SELECTOR PLAN 1
- pt with 2-3 days of cough with yellow sputum, sore throat, and runny nose   - procal elevated, leukocytosis noted   - CT with right lower lobe airspace opacity new since the previous exam. This could represent infection.   - Continue ceftriaxone (6/23-)  -Upon discharge, will transition to PO antibiotics. Improving   -At admission patient had 2-3 days of cough with yellow sputum, sore throat and runny nose. Procalcitonin and WBC elevated.   -CT demonstrated a RLL opacity.   -Patient started on Ceftriaxone(6/23-)  -no longer endorses any cough or sputum production. Saturating well on room air.   -Upon discharge, will transition to PO antibiotics.  -Patient is medically cleared and is pending discharge to [facility].

## 2023-06-26 NOTE — PROGRESS NOTE ADULT - PROBLEM SELECTOR PLAN 4
- DVT ppx: lovenox   - dispo: [facility] upon discharge  Diet: Consistent carbs - DVT ppx: lovenox   - dispo: [facility]   Diet: Consistent carbs

## 2023-06-26 NOTE — PROVIDER CONTACT NOTE (OTHER) - ASSESSMENT
Patient refusing all morning medications
Patient refusing morning meds and am blood draws
Patient experiencing chest pain

## 2023-06-26 NOTE — PROVIDER CONTACT NOTE (OTHER) - BACKGROUND
Patient admitted for uti
Patient admitted for urinary tract infection
Patient admitted for urinary tract infection

## 2023-06-26 NOTE — PROVIDER CONTACT NOTE (OTHER) - RECOMMENDATIONS
MD Len Meadows notified and made aware
MD Kade Rodriguez notified and made aware
MD Kade Rodriguez notified and made aware

## 2023-06-26 NOTE — PROVIDER CONTACT NOTE (OTHER) - REASON
Patient experiencing chest pain
Patient refusing all morning medications
Patient refusing morning meds and am blood draws

## 2023-06-26 NOTE — BH CONSULTATION LIAISON PROGRESS NOTE - NSBHATTESTCOMMENTATTENDFT_PSY_A_CORE
agree with above, patient seen as f/u from [facility] transfer, changes his mind about wanting to go back to [facility] frequently, poor insight, would keep 1:1 for SI and elopement risk, return to [facility] when medically cleared to do so. No sfx from meds noted at this times.

## 2023-06-26 NOTE — PROGRESS NOTE ADULT - ASSESSMENT
33 year old male comes into the ED from [facility] (originally from [facility]) for lethargy ongoing for 2-3 days being treated for pneumonia. Patients clinical presentation continues to improve.

## 2023-06-26 NOTE — PROGRESS NOTE ADULT - SUBJECTIVE AND OBJECTIVE BOX
PROGRESS NOTE:   Authored by Rey York MD  Patient is a 33y old  Male who presents with a chief complaint of Lethargy (25 Jun 2023 06:28)      SUBJECTIVE / OVERNIGHT EVENTS:  No acute events overnight.     ADDITIONAL REVIEW OF SYSTEMS:    MEDICATIONS  (STANDING):  atorvastatin 10 milliGRAM(s) Oral at bedtime  cefTRIAXone   IVPB 1000 milliGRAM(s) IV Intermittent every 24 hours  clonazePAM  Tablet 1 milliGRAM(s) Oral daily  dextrose 5%. 1000 milliLiter(s) (100 mL/Hr) IV Continuous <Continuous>  dextrose 5%. 1000 milliLiter(s) (50 mL/Hr) IV Continuous <Continuous>  dextrose 50% Injectable 25 Gram(s) IV Push once  dextrose 50% Injectable 25 Gram(s) IV Push once  dextrose 50% Injectable 12.5 Gram(s) IV Push once  divalproex DR 1500 milliGRAM(s) Oral at bedtime  enoxaparin Injectable 40 milliGRAM(s) SubCutaneous every 12 hours  gabapentin 400 milliGRAM(s) Oral three times a day  glucagon  Injectable 1 milliGRAM(s) IntraMuscular once  insulin lispro (ADMELOG) corrective regimen sliding scale   SubCutaneous three times a day before meals  lactated ringers. 1000 milliLiter(s) (100 mL/Hr) IV Continuous <Continuous>  levothyroxine 50 MICROGram(s) Oral daily  loratadine 10 milliGRAM(s) Oral daily  mirtazapine 30 milliGRAM(s) Oral at bedtime  pantoprazole    Tablet 40 milliGRAM(s) Oral before breakfast  risperiDONE   Tablet 4 milliGRAM(s) Oral two times a day  senna 2 Tablet(s) Oral at bedtime  tamsulosin 0.4 milliGRAM(s) Oral at bedtime    MEDICATIONS  (PRN):  acetaminophen     Tablet .. 650 milliGRAM(s) Oral every 6 hours PRN Temp greater or equal to 38C (100.4F), Mild Pain (1 - 3)  aluminum hydroxide/magnesium hydroxide/simethicone Suspension 30 milliLiter(s) Oral every 4 hours PRN Dyspepsia  benzocaine/menthol Lozenge 1 Lozenge Oral three times a day PRN Sore Throat  chlorproMAZINE    Injectable 50 milliGRAM(s) IntraMuscular every 6 hours PRN if not tolerating PO  chlorproMAZINE    Tablet 50 milliGRAM(s) Oral every 6 hours PRN agitation  dextrose Oral Gel 15 Gram(s) Oral once PRN Blood Glucose LESS THAN 70 milliGRAM(s)/deciliter  guaiFENesin Oral Liquid (Sugar-Free) 200 milliGRAM(s) Oral every 6 hours PRN Cough  LORazepam     Tablet 2 milliGRAM(s) Oral every 6 hours PRN Agitation  LORazepam   Injectable 2 milliGRAM(s) IntraMuscular every 6 hours PRN if not tolerating PO  melatonin 3 milliGRAM(s) Oral at bedtime PRN Insomnia  ondansetron Injectable 4 milliGRAM(s) IV Push every 8 hours PRN Nausea and/or Vomiting      CAPILLARY BLOOD GLUCOSE      POCT Blood Glucose.: 194 mg/dL (25 Jun 2023 21:27)  POCT Blood Glucose.: 172 mg/dL (25 Jun 2023 17:27)  POCT Blood Glucose.: 171 mg/dL (25 Jun 2023 12:25)  POCT Blood Glucose.: 138 mg/dL (25 Jun 2023 08:43)    I&O's Summary      PHYSICAL EXAM:  Vital Signs Last 24 Hrs  T(C): 36.7 (26 Jun 2023 05:27), Max: 36.9 (25 Jun 2023 21:46)  T(F): 98 (26 Jun 2023 05:27), Max: 98.4 (25 Jun 2023 21:46)  HR: 85 (26 Jun 2023 05:27) (85 - 91)  BP: 125/76 (26 Jun 2023 05:27) (125/76 - 138/90)  BP(mean): --  RR: 18 (26 Jun 2023 05:27) (18 - 19)  SpO2: 93% (26 Jun 2023 05:27) (93% - 100%)    Parameters below as of 26 Jun 2023 05:27  Patient On (Oxygen Delivery Method): room air        GENERAL: No apparent distress.   HEAD:  Atraumatic, Normocephalic  EYES: EOMI, PERRLA, conjunctiva and sclera clear  NECK: Supple, no lymphadenopathy, no elevated JVP  CHEST/LUNG: Clear to auscultation bilateral and symmetric; No wheezes, rales, or rhonchi  HEART: S1 and S2 normal. Regular rate and rhythm; No murmurs, rubs, or gallops  ABDOMEN: Soft, non-tender, non-distended; normal bowel sounds  EXTREMITIES:  2+ peripheral pulses b/l, No clubbing, cyanosis, or edema  NEUROLOGY: A&O x 3, no focal deficits  SKIN: No rashes or lesions    LABS:                      RADIOLOGY & ADDITIONAL TESTS:  Lab Results Reviewed   Imaging Reviewed  Electrocardiogram Reviewed   PROGRESS NOTE:   Authored by Rey York MD  Patient is a 33y old  Male who presents with a chief complaint of Lethargy (25 Jun 2023 06:28)      SUBJECTIVE / OVERNIGHT EVENTS:  Patient was seen and examined at bedside and did not appear to be in any acute distress. He was resting comfortably in bed and stated that he had no dyspnea or chest pain.     ADDITIONAL REVIEW OF SYSTEMS: He denies any nausea, vomiting, hematuria, dysuria, flank pain, fevers or chills.     MEDICATIONS  (STANDING):  atorvastatin 10 milliGRAM(s) Oral at bedtime  cefTRIAXone   IVPB 1000 milliGRAM(s) IV Intermittent every 24 hours  clonazePAM  Tablet 1 milliGRAM(s) Oral daily  dextrose 5%. 1000 milliLiter(s) (100 mL/Hr) IV Continuous <Continuous>  dextrose 5%. 1000 milliLiter(s) (50 mL/Hr) IV Continuous <Continuous>  dextrose 50% Injectable 25 Gram(s) IV Push once  dextrose 50% Injectable 25 Gram(s) IV Push once  dextrose 50% Injectable 12.5 Gram(s) IV Push once  divalproex DR 1500 milliGRAM(s) Oral at bedtime  enoxaparin Injectable 40 milliGRAM(s) SubCutaneous every 12 hours  gabapentin 400 milliGRAM(s) Oral three times a day  glucagon  Injectable 1 milliGRAM(s) IntraMuscular once  insulin lispro (ADMELOG) corrective regimen sliding scale   SubCutaneous three times a day before meals  lactated ringers. 1000 milliLiter(s) (100 mL/Hr) IV Continuous <Continuous>  levothyroxine 50 MICROGram(s) Oral daily  loratadine 10 milliGRAM(s) Oral daily  mirtazapine 30 milliGRAM(s) Oral at bedtime  pantoprazole    Tablet 40 milliGRAM(s) Oral before breakfast  risperiDONE   Tablet 4 milliGRAM(s) Oral two times a day  senna 2 Tablet(s) Oral at bedtime  tamsulosin 0.4 milliGRAM(s) Oral at bedtime    MEDICATIONS  (PRN):  acetaminophen     Tablet .. 650 milliGRAM(s) Oral every 6 hours PRN Temp greater or equal to 38C (100.4F), Mild Pain (1 - 3)  aluminum hydroxide/magnesium hydroxide/simethicone Suspension 30 milliLiter(s) Oral every 4 hours PRN Dyspepsia  benzocaine/menthol Lozenge 1 Lozenge Oral three times a day PRN Sore Throat  chlorproMAZINE    Injectable 50 milliGRAM(s) IntraMuscular every 6 hours PRN if not tolerating PO  chlorproMAZINE    Tablet 50 milliGRAM(s) Oral every 6 hours PRN agitation  dextrose Oral Gel 15 Gram(s) Oral once PRN Blood Glucose LESS THAN 70 milliGRAM(s)/deciliter  guaiFENesin Oral Liquid (Sugar-Free) 200 milliGRAM(s) Oral every 6 hours PRN Cough  LORazepam     Tablet 2 milliGRAM(s) Oral every 6 hours PRN Agitation  LORazepam   Injectable 2 milliGRAM(s) IntraMuscular every 6 hours PRN if not tolerating PO  melatonin 3 milliGRAM(s) Oral at bedtime PRN Insomnia  ondansetron Injectable 4 milliGRAM(s) IV Push every 8 hours PRN Nausea and/or Vomiting      CAPILLARY BLOOD GLUCOSE      POCT Blood Glucose.: 194 mg/dL (25 Jun 2023 21:27)  POCT Blood Glucose.: 172 mg/dL (25 Jun 2023 17:27)  POCT Blood Glucose.: 171 mg/dL (25 Jun 2023 12:25)  POCT Blood Glucose.: 138 mg/dL (25 Jun 2023 08:43)    I&O's Summary      PHYSICAL EXAM:  Vital Signs Last 24 Hrs  T(C): 36.7 (26 Jun 2023 05:27), Max: 36.9 (25 Jun 2023 21:46)  T(F): 98 (26 Jun 2023 05:27), Max: 98.4 (25 Jun 2023 21:46)  HR: 85 (26 Jun 2023 05:27) (85 - 91)  BP: 125/76 (26 Jun 2023 05:27) (125/76 - 138/90)  BP(mean): --  RR: 18 (26 Jun 2023 05:27) (18 - 19)  SpO2: 93% (26 Jun 2023 05:27) (93% - 100%)    Parameters below as of 26 Jun 2023 05:27  Patient On (Oxygen Delivery Method): room air        GENERAL: No apparent distress.   HEAD:  Atraumatic, Normocephalic  EYES: EOMI, PERRLA, conjunctiva and sclera clear  NECK: Supple, no lymphadenopathy, no elevated JVP  CHEST/LUNG: Clear to auscultation bilateral and symmetric; No wheezes, rales, or rhonchi  HEART: S1 and S2 normal. Regular rate and rhythm; No murmurs, rubs, or gallops  ABDOMEN: Soft, non-tender, non-distended; normal bowel sounds  EXTREMITIES:  2+ peripheral pulses b/l, No clubbing, cyanosis, or edema  NEUROLOGY: A&O x 3, no focal deficits  SKIN: No rashes or lesions    LABS:                      RADIOLOGY & ADDITIONAL TESTS:  Lab Results Reviewed   Imaging Reviewed  Electrocardiogram Reviewed

## 2023-06-26 NOTE — PROVIDER CONTACT NOTE (OTHER) - SITUATION
Patient refusing all morning medications
Patient experiencing chest pain
Patient refusing morning meds and am blood draws

## 2023-06-27 LAB
GLUCOSE BLDC GLUCOMTR-MCNC: 150 MG/DL — HIGH (ref 70–99)
GLUCOSE BLDC GLUCOMTR-MCNC: 167 MG/DL — HIGH (ref 70–99)
GLUCOSE BLDC GLUCOMTR-MCNC: 180 MG/DL — HIGH (ref 70–99)
GLUCOSE BLDC GLUCOMTR-MCNC: 204 MG/DL — HIGH (ref 70–99)

## 2023-06-27 PROCEDURE — 99232 SBSQ HOSP IP/OBS MODERATE 35: CPT

## 2023-06-27 RX ADMIN — ENOXAPARIN SODIUM 40 MILLIGRAM(S): 100 INJECTION SUBCUTANEOUS at 22:00

## 2023-06-27 RX ADMIN — SENNA PLUS 2 TABLET(S): 8.6 TABLET ORAL at 21:59

## 2023-06-27 RX ADMIN — GABAPENTIN 400 MILLIGRAM(S): 400 CAPSULE ORAL at 05:25

## 2023-06-27 RX ADMIN — Medication 650 MILLIGRAM(S): at 05:25

## 2023-06-27 RX ADMIN — LORATADINE 10 MILLIGRAM(S): 10 TABLET ORAL at 12:22

## 2023-06-27 RX ADMIN — RISPERIDONE 4 MILLIGRAM(S): 4 TABLET ORAL at 18:26

## 2023-06-27 RX ADMIN — Medication 1 MILLIGRAM(S): at 12:22

## 2023-06-27 RX ADMIN — Medication 50 MICROGRAM(S): at 05:26

## 2023-06-27 RX ADMIN — RISPERIDONE 4 MILLIGRAM(S): 4 TABLET ORAL at 05:26

## 2023-06-27 RX ADMIN — MIRTAZAPINE 30 MILLIGRAM(S): 45 TABLET, ORALLY DISINTEGRATING ORAL at 22:00

## 2023-06-27 RX ADMIN — CEFTRIAXONE 100 MILLIGRAM(S): 500 INJECTION, POWDER, FOR SOLUTION INTRAMUSCULAR; INTRAVENOUS at 00:20

## 2023-06-27 RX ADMIN — DIVALPROEX SODIUM 1500 MILLIGRAM(S): 500 TABLET, DELAYED RELEASE ORAL at 22:01

## 2023-06-27 RX ADMIN — GABAPENTIN 400 MILLIGRAM(S): 400 CAPSULE ORAL at 22:00

## 2023-06-27 RX ADMIN — Medication 650 MILLIGRAM(S): at 04:35

## 2023-06-27 RX ADMIN — ENOXAPARIN SODIUM 40 MILLIGRAM(S): 100 INJECTION SUBCUTANEOUS at 09:37

## 2023-06-27 RX ADMIN — Medication 3 MILLIGRAM(S): at 23:50

## 2023-06-27 RX ADMIN — PANTOPRAZOLE SODIUM 40 MILLIGRAM(S): 20 TABLET, DELAYED RELEASE ORAL at 05:28

## 2023-06-27 RX ADMIN — Medication 1: at 18:26

## 2023-06-27 RX ADMIN — ATORVASTATIN CALCIUM 10 MILLIGRAM(S): 80 TABLET, FILM COATED ORAL at 22:00

## 2023-06-27 RX ADMIN — Medication 1: at 12:21

## 2023-06-27 RX ADMIN — GABAPENTIN 400 MILLIGRAM(S): 400 CAPSULE ORAL at 12:22

## 2023-06-27 NOTE — PROGRESS NOTE ADULT - ASSESSMENT
33 year old male comes into the ED from [facility] (originally from [facility]) for lethargy ongoing for 2-3 days being treated for pneumonia. Patients clinical presentation continues to improve.

## 2023-06-27 NOTE — BH CONSULTATION LIAISON PROGRESS NOTE - NSBHATTESTCOMMENTATTENDFT_PSY_A_CORE
agree with above, condition unchanged, return to [facility] when bed available, 2PC complete, c/w 1:1 while here at [facility]  agree with above, condition unchanged, return to [facility] when bed available, 2PC complete, c/w 1:1 while here at [facility], needs readmission

## 2023-06-27 NOTE — PROGRESS NOTE ADULT - PROBLEM SELECTOR PLAN 1
Improving   -At admission patient had 2-3 days of cough with yellow sputum, sore throat and runny nose. Procalcitonin and WBC elevated.   -CT demonstrated a RLL opacity.   -Patient started on Ceftriaxone(6/23-)  -no longer endorses any cough or sputum production. Saturating well on room air.   -Upon discharge, will transition to PO antibiotics.  -Patient is medically cleared and is pending discharge to [facility].

## 2023-06-28 ENCOUNTER — INPATIENT (INPATIENT)
Facility: HOSPITAL | Age: 33
LOS: 0 days | Discharge: ANOTHER HOSPITAL | End: 2023-06-29
Attending: PSYCHIATRY & NEUROLOGY | Admitting: PSYCHIATRY & NEUROLOGY
Payer: MEDICAID

## 2023-06-28 ENCOUNTER — TRANSCRIPTION ENCOUNTER (OUTPATIENT)
Age: 33
End: 2023-06-28

## 2023-06-28 ENCOUNTER — INPATIENT (INPATIENT)
Facility: HOSPITAL | Age: 33
LOS: 14 days | Discharge: PSYCHIATRIC OR SUBSTANCE USE FACILITY | End: 2023-07-13
Attending: INTERNAL MEDICINE | Admitting: INTERNAL MEDICINE
Payer: MEDICAID

## 2023-06-28 VITALS
TEMPERATURE: 97 F | OXYGEN SATURATION: 100 % | RESPIRATION RATE: 18 BRPM | HEART RATE: 91 BPM | SYSTOLIC BLOOD PRESSURE: 141 MMHG | DIASTOLIC BLOOD PRESSURE: 66 MMHG

## 2023-06-28 VITALS
HEIGHT: 70 IN | TEMPERATURE: 99 F | RESPIRATION RATE: 16 BRPM | OXYGEN SATURATION: 98 % | SYSTOLIC BLOOD PRESSURE: 137 MMHG | DIASTOLIC BLOOD PRESSURE: 89 MMHG | HEART RATE: 120 BPM

## 2023-06-28 VITALS — TEMPERATURE: 97 F | WEIGHT: 287.92 LBS | OXYGEN SATURATION: 96 % | HEIGHT: 70 IN | RESPIRATION RATE: 16 BRPM

## 2023-06-28 VITALS — OXYGEN SATURATION: 96 % | RESPIRATION RATE: 18 BRPM | TEMPERATURE: 98 F

## 2023-06-28 DIAGNOSIS — F32.9 MAJOR DEPRESSIVE DISORDER, SINGLE EPISODE, UNSPECIFIED: ICD-10-CM

## 2023-06-28 LAB
BASE EXCESS BLDV CALC-SCNC: 2.2 MMOL/L — SIGNIFICANT CHANGE UP (ref -2–3)
BLOOD GAS VENOUS COMPREHENSIVE RESULT: SIGNIFICANT CHANGE UP
CHLORIDE BLDV-SCNC: 99 MMOL/L — SIGNIFICANT CHANGE UP (ref 96–108)
CO2 BLDV-SCNC: 29.2 MMOL/L — HIGH (ref 22–26)
GAS PNL BLDV: 130 MMOL/L — LOW (ref 136–145)
GAS PNL BLDV: SIGNIFICANT CHANGE UP
GLUCOSE BLDC GLUCOMTR-MCNC: 133 MG/DL — HIGH (ref 70–99)
GLUCOSE BLDC GLUCOMTR-MCNC: 198 MG/DL — HIGH (ref 70–99)
GLUCOSE BLDC GLUCOMTR-MCNC: 206 MG/DL — HIGH (ref 70–99)
GLUCOSE BLDV-MCNC: 244 MG/DL — HIGH (ref 70–99)
HCO3 BLDV-SCNC: 28 MMOL/L — SIGNIFICANT CHANGE UP (ref 22–29)
HCT VFR BLDA CALC: 42 % — SIGNIFICANT CHANGE UP (ref 39–51)
HGB BLD CALC-MCNC: 13.9 G/DL — SIGNIFICANT CHANGE UP (ref 12.6–17.4)
LACTATE BLDV-MCNC: 2.4 MMOL/L — HIGH (ref 0.5–2)
PCO2 BLDV: 46 MMHG — SIGNIFICANT CHANGE UP (ref 42–55)
PH BLDV: 7.39 — SIGNIFICANT CHANGE UP (ref 7.32–7.43)
PO2 BLDV: 117 MMHG — HIGH (ref 25–45)
POTASSIUM BLDV-SCNC: 6 MMOL/L — HIGH (ref 3.5–5.1)
SAO2 % BLDV: 98.9 % — HIGH (ref 67–88)

## 2023-06-28 PROCEDURE — 99223 1ST HOSP IP/OBS HIGH 75: CPT

## 2023-06-28 PROCEDURE — 99232 SBSQ HOSP IP/OBS MODERATE 35: CPT

## 2023-06-28 PROCEDURE — 99239 HOSP IP/OBS DSCHRG MGMT >30: CPT | Mod: GC

## 2023-06-28 PROCEDURE — 99285 EMERGENCY DEPT VISIT HI MDM: CPT

## 2023-06-28 RX ORDER — DIVALPROEX SODIUM 500 MG/1
3 TABLET, DELAYED RELEASE ORAL
Qty: 0 | Refills: 0 | DISCHARGE
Start: 2023-06-28

## 2023-06-28 RX ORDER — MIRTAZAPINE 45 MG/1
1 TABLET, ORALLY DISINTEGRATING ORAL
Qty: 0 | Refills: 0 | DISCHARGE
Start: 2023-06-28

## 2023-06-28 RX ORDER — BACITRACIN ZINC 500 UNIT/G
1 OINTMENT IN PACKET (EA) TOPICAL
Refills: 0 | Status: DISCONTINUED | OUTPATIENT
Start: 2023-06-28 | End: 2023-06-29

## 2023-06-28 RX ORDER — TAMSULOSIN HYDROCHLORIDE 0.4 MG/1
0.4 CAPSULE ORAL AT BEDTIME
Refills: 0 | Status: DISCONTINUED | OUTPATIENT
Start: 2023-06-28 | End: 2023-06-29

## 2023-06-28 RX ORDER — CHLORPROMAZINE HCL 10 MG
50 TABLET ORAL ONCE
Refills: 0 | Status: DISCONTINUED | OUTPATIENT
Start: 2023-06-28 | End: 2023-06-29

## 2023-06-28 RX ORDER — DIVALPROEX SODIUM 500 MG/1
1500 TABLET, DELAYED RELEASE ORAL AT BEDTIME
Refills: 0 | Status: DISCONTINUED | OUTPATIENT
Start: 2023-06-28 | End: 2023-06-29

## 2023-06-28 RX ORDER — DIPHENHYDRAMINE HCL 50 MG
50 CAPSULE ORAL ONCE
Refills: 0 | Status: DISCONTINUED | OUTPATIENT
Start: 2023-06-28 | End: 2023-06-28

## 2023-06-28 RX ORDER — GABAPENTIN 400 MG/1
400 CAPSULE ORAL THREE TIMES A DAY
Refills: 0 | Status: DISCONTINUED | OUTPATIENT
Start: 2023-06-28 | End: 2023-06-29

## 2023-06-28 RX ORDER — ATORVASTATIN CALCIUM 80 MG/1
10 TABLET, FILM COATED ORAL AT BEDTIME
Refills: 0 | Status: DISCONTINUED | OUTPATIENT
Start: 2023-06-28 | End: 2023-06-29

## 2023-06-28 RX ORDER — MIRTAZAPINE 45 MG/1
30 TABLET, ORALLY DISINTEGRATING ORAL AT BEDTIME
Refills: 0 | Status: DISCONTINUED | OUTPATIENT
Start: 2023-06-28 | End: 2023-06-29

## 2023-06-28 RX ORDER — LANOLIN ALCOHOL/MO/W.PET/CERES
3 CREAM (GRAM) TOPICAL AT BEDTIME
Refills: 0 | Status: DISCONTINUED | OUTPATIENT
Start: 2023-06-28 | End: 2023-06-29

## 2023-06-28 RX ORDER — DEXTROSE 50 % IN WATER 50 %
15 SYRINGE (ML) INTRAVENOUS ONCE
Refills: 0 | Status: DISCONTINUED | OUTPATIENT
Start: 2023-06-28 | End: 2023-06-29

## 2023-06-28 RX ORDER — INSULIN LISPRO 100/ML
VIAL (ML) SUBCUTANEOUS
Refills: 0 | Status: DISCONTINUED | OUTPATIENT
Start: 2023-06-28 | End: 2023-06-29

## 2023-06-28 RX ORDER — CLONAZEPAM 1 MG
1 TABLET ORAL
Qty: 0 | Refills: 0 | DISCHARGE
Start: 2023-06-28

## 2023-06-28 RX ORDER — RISPERIDONE 4 MG/1
4 TABLET ORAL
Refills: 0 | Status: DISCONTINUED | OUTPATIENT
Start: 2023-06-28 | End: 2023-06-29

## 2023-06-28 RX ORDER — CHLORPROMAZINE HCL 10 MG
50 TABLET ORAL ONCE
Refills: 0 | Status: DISCONTINUED | OUTPATIENT
Start: 2023-06-28 | End: 2023-06-28

## 2023-06-28 RX ORDER — BACITRACIN ZINC 500 UNIT/G
1 OINTMENT IN PACKET (EA) TOPICAL
Qty: 0 | Refills: 0 | DISCHARGE
Start: 2023-06-28

## 2023-06-28 RX ORDER — LORATADINE 10 MG/1
10 TABLET ORAL DAILY
Refills: 0 | Status: DISCONTINUED | OUTPATIENT
Start: 2023-06-28 | End: 2023-06-29

## 2023-06-28 RX ORDER — CHLORPROMAZINE HCL 10 MG
50 TABLET ORAL EVERY 6 HOURS
Refills: 0 | Status: DISCONTINUED | OUTPATIENT
Start: 2023-06-28 | End: 2023-06-28

## 2023-06-28 RX ORDER — PANTOPRAZOLE SODIUM 20 MG/1
40 TABLET, DELAYED RELEASE ORAL
Refills: 0 | Status: DISCONTINUED | OUTPATIENT
Start: 2023-06-28 | End: 2023-06-29

## 2023-06-28 RX ORDER — CLONAZEPAM 1 MG
1 TABLET ORAL DAILY
Refills: 0 | Status: DISCONTINUED | OUTPATIENT
Start: 2023-06-28 | End: 2023-06-29

## 2023-06-28 RX ORDER — GLUCAGON INJECTION, SOLUTION 0.5 MG/.1ML
1 INJECTION, SOLUTION SUBCUTANEOUS ONCE
Refills: 0 | Status: DISCONTINUED | OUTPATIENT
Start: 2023-06-28 | End: 2023-06-29

## 2023-06-28 RX ORDER — DIPHENHYDRAMINE HCL 50 MG
50 CAPSULE ORAL EVERY 6 HOURS
Refills: 0 | Status: DISCONTINUED | OUTPATIENT
Start: 2023-06-28 | End: 2023-06-28

## 2023-06-28 RX ORDER — BACITRACIN ZINC 500 UNIT/G
1 OINTMENT IN PACKET (EA) TOPICAL
Refills: 0 | Status: DISCONTINUED | OUTPATIENT
Start: 2023-06-28 | End: 2023-06-28

## 2023-06-28 RX ORDER — SENNA PLUS 8.6 MG/1
2 TABLET ORAL AT BEDTIME
Refills: 0 | Status: DISCONTINUED | OUTPATIENT
Start: 2023-06-28 | End: 2023-06-29

## 2023-06-28 RX ORDER — LEVOTHYROXINE SODIUM 125 MCG
50 TABLET ORAL DAILY
Refills: 0 | Status: DISCONTINUED | OUTPATIENT
Start: 2023-06-28 | End: 2023-06-29

## 2023-06-28 RX ADMIN — Medication 50 MICROGRAM(S): at 06:09

## 2023-06-28 RX ADMIN — ATORVASTATIN CALCIUM 10 MILLIGRAM(S): 80 TABLET, FILM COATED ORAL at 20:10

## 2023-06-28 RX ADMIN — Medication 1 MILLIGRAM(S): at 12:57

## 2023-06-28 RX ADMIN — GABAPENTIN 400 MILLIGRAM(S): 400 CAPSULE ORAL at 20:11

## 2023-06-28 RX ADMIN — MIRTAZAPINE 30 MILLIGRAM(S): 45 TABLET, ORALLY DISINTEGRATING ORAL at 20:09

## 2023-06-28 RX ADMIN — DIVALPROEX SODIUM 1500 MILLIGRAM(S): 500 TABLET, DELAYED RELEASE ORAL at 20:09

## 2023-06-28 RX ADMIN — Medication 2: at 13:00

## 2023-06-28 RX ADMIN — PANTOPRAZOLE SODIUM 40 MILLIGRAM(S): 20 TABLET, DELAYED RELEASE ORAL at 06:09

## 2023-06-28 RX ADMIN — Medication 50 MILLIGRAM(S): at 20:12

## 2023-06-28 RX ADMIN — RISPERIDONE 4 MILLIGRAM(S): 4 TABLET ORAL at 06:09

## 2023-06-28 RX ADMIN — RISPERIDONE 4 MILLIGRAM(S): 4 TABLET ORAL at 20:10

## 2023-06-28 RX ADMIN — Medication 1 APPLICATION(S): at 20:10

## 2023-06-28 RX ADMIN — GABAPENTIN 400 MILLIGRAM(S): 400 CAPSULE ORAL at 06:08

## 2023-06-28 RX ADMIN — LORATADINE 10 MILLIGRAM(S): 10 TABLET ORAL at 12:55

## 2023-06-28 RX ADMIN — Medication 3 MILLIGRAM(S): at 20:11

## 2023-06-28 RX ADMIN — GABAPENTIN 400 MILLIGRAM(S): 400 CAPSULE ORAL at 15:06

## 2023-06-28 RX ADMIN — ENOXAPARIN SODIUM 40 MILLIGRAM(S): 100 INJECTION SUBCUTANEOUS at 06:09

## 2023-06-28 RX ADMIN — TAMSULOSIN HYDROCHLORIDE 0.4 MILLIGRAM(S): 0.4 CAPSULE ORAL at 20:10

## 2023-06-28 NOTE — DISCHARGE NOTE NURSING/CASE MANAGEMENT/SOCIAL WORK - PATIENT PORTAL LINK FT
You can access the FollowMyHealth Patient Portal offered by [facility] by registering at the following website: [email or web address]. By joining [facility]’s FollowMyHealth portal, you will also be able to view your health information using other applications (apps) compatible with our system.

## 2023-06-28 NOTE — BH INPATIENT PSYCHIATRY ASSESSMENT NOTE - HPI (INCLUDE ILLNESS QUALITY, SEVERITY, DURATION, TIMING, CONTEXT, MODIFYING FACTORS, ASSOCIATED SIGNS AND SYMPTOMS)
Pt is a 32 yo M, single, disabled, non-caregiver, resides at a [facility], with psych h/o moderate ID, ASD, impulse control disorder, factitious disorder, HAVEN, mood disorders, PTSD and ADHD, multiple past psych admissions (last known [facility] 6/13/23-present), numerous ED visits for both medical/psych complaints, longstanding h/o SIB (head banging, cutting), but no known SA, longstanding h/o endorsing SI, h/o aggression toward staff at , PMH significant for asthma, DM, urinary retention, GERD, BPH, hypothyroidism, HLD, HTN, and b/l myopia,  who was admitted to [facility] ML4  for self inflicted laceration to abdomen, endorsing CAH to hurt himself and staff at group home. Pt transferred to [facility] and admitted to medicine on 6/23/23 for lethargy, pneumonia. Psychiatry consulted and now being re-admitted to [facility] for further management of SI, mood sx.     On [facility] CL service, patient was initially calm and cooperative. During the admission, he had an episode of agitation where he stated he wanted to kill himself, tried pulling out the IVs and scratching himself. On follow up assessments, he denied SI/HI/I/P but was minimally engaged with team.    On ML4, patient reports chest pain and dizziness on initial introduction and was requesting to go back to [facility]. Patient was found to be tachycardic but otherwise vitally stable. Patient stated that his mood was fine. Reported difficulty falling asleep but fair appetite. Denies AVH. Denied SI/HI/I/P and making suicidal statement while in [facility]. Denies substance use states last used alcohol and cigarettes two years ago.     On re-assessment, patient remained tachycardic but improved from initial check.

## 2023-06-28 NOTE — ED PROVIDER NOTE - PHYSICAL EXAMINATION
Vitals: I have reviewed the patients vital signs  General: nontoxic appearing  HEENT: Atraumatic, normocephalic, airway patent  Eyes: EOMI, tracking appropriately  Neck: no tracheal deviation  Chest/Lungs: no trauma, symmetric chest rise, speaking in complete sentences,  no resp distress  Heart: skin and extremities well perfused, tachycardic rate and regular rhythm  Neuro: A+Ox3, appears non focal  MSK: no deformities  Skin: no cyanosis, no jaundice, Healing laceration on the abdominal wall   Psych:  Normal mood and affect

## 2023-06-28 NOTE — ED ADULT NURSE NOTE - NSFALLUNIVINTERV_ED_ALL_ED
Bed/Stretcher in lowest position, wheels locked, appropriate side rails in place/Call bell, personal items and telephone in reach/Instruct patient to call for assistance before getting out of bed/chair/stretcher/Non-slip footwear applied when patient is off stretcher/[place] to call system/Physically safe environment - no spills, clutter or unnecessary equipment/Purposeful proactive rounding/Room/bathroom lighting operational, light cord in reach

## 2023-06-28 NOTE — ED PROVIDER NOTE - NSICDXNOPASTSURGICALHX_GEN_ALL_ED
<-- Click to add NO significant Past Surgical History
1st Trimester Sonogram/20 Week Level II Sonogram/BioPhysical Profile(s)/Fetal Non-Stress Test (NST)

## 2023-06-28 NOTE — PROGRESS NOTE ADULT - PROBLEM SELECTOR PLAN 1
Improving   -At admission patient had 2-3 days of cough with yellow sputum, sore throat and runny nose. Procalcitonin and WBC elevated.   -CT demonstrated a RLL opacity.   -Patient started on Ceftriaxone(6/23-)  -no longer endorses any cough or sputum production. Saturating well on room air.   -Upon discharge, will transition to PO antibiotics.  -Patient is medically cleared and is pending discharge to [facility].

## 2023-06-28 NOTE — CHART NOTE - NSCHARTNOTEFT_GEN_A_CORE
[facility] Inpatient to ED Transfer Summary    Reason for Transfer/Medical Summary: Tachycardia      PAST MEDICAL & SURGICAL HISTORY:  Mood disorder      Intellectual disability      Asthma      Obesity      GERD (gastroesophageal reflux disease)      Asthma      GERD (gastroesophageal reflux disease)      Factitious disorder      Urinary retention      Enuresis      Myopia of both eyes      Autism      Hypothyroidism      Hypothyroid      History of BPH      Dyslipidemia      HLD (hyperlipidemia)      Impulse control disorder      No significant past surgical history      No significant past surgical history          Allergies    Zyprexa (Unknown)  Haldol (Rash)  Zyprexa (Dystonic RXN)  Bee stings (Unknown)  Haldol (Other)    Intolerances        MEDICATIONS  (STANDING):  atorvastatin 10 milliGRAM(s) Oral at bedtime  bacitracin   Ointment 1 Application(s) Topical two times a day  clonazePAM  Tablet 1 milliGRAM(s) Oral daily  divalproex DR 1500 milliGRAM(s) Oral at bedtime  gabapentin 400 milliGRAM(s) Oral three times a day  glucagon  Injectable 1 milliGRAM(s) IntraMuscular once  insulin lispro (ADMELOG) corrective regimen sliding scale   SubCutaneous three times a day before meals  levothyroxine 50 MICROGram(s) Oral daily  loratadine 10 milliGRAM(s) Oral daily  melatonin. 3 milliGRAM(s) Oral at bedtime  mirtazapine 30 milliGRAM(s) Oral at bedtime  pantoprazole    Tablet 40 milliGRAM(s) Oral before breakfast  risperiDONE   Tablet 4 milliGRAM(s) Oral two times a day  tamsulosin 0.4 milliGRAM(s) Oral at bedtime    MEDICATIONS  (PRN):  chlorproMAZINE    Injectable 50 milliGRAM(s) IntraMuscular once PRN severe agitation  dextrose Oral Gel 15 Gram(s) Oral once PRN Blood Glucose LESS THAN 70 milliGRAM(s)/deciliter  guaiFENesin Oral Liquid (Sugar-Free) 200 milliGRAM(s) Oral every 6 hours PRN cough  LORazepam     Tablet 2 milliGRAM(s) Oral every 6 hours PRN anxiety/agitation  LORazepam   Injectable 2 milliGRAM(s) IntraMuscular once PRN severe anxiety/agitation  senna 2 Tablet(s) Oral at bedtime PRN constipation      Vital Signs Last 24 Hrs  T(C): 36 (28 Jun 2023 16:38), Max: 36.9 (28 Jun 2023 12:03)  T(F): 96.8 (28 Jun 2023 16:38), Max: 98.5 (28 Jun 2023 12:03)  HR: 119 (28 Jun 2023 20:04) (96 - 119)  BP: 131/85 (28 Jun 2023 12:03) (114/87 - 131/85)  BP(mean): --  RR: 16 (28 Jun 2023 16:38) (16 - 19)  SpO2: 97% (28 Jun 2023 20:04) (94% - 97%)      CAPILLARY BLOOD GLUCOSE      POCT Blood Glucose.: 198 mg/dL (28 Jun 2023 20:10)  POCT Blood Glucose.: 206 mg/dL (28 Jun 2023 12:19)  POCT Blood Glucose.: 133 mg/dL (28 Jun 2023 08:52)  POCT Blood Glucose.: 204 mg/dL (27 Jun 2023 21:37)        PHYSICAL EXAM:  GENERAL: NAD, well-developed  HEAD:  Atraumatic, Normocephalic  EYES: EOMI, PERRLA, conjunctiva and sclera clear  NECK: Supple, No JVD  CHEST/LUNG: Clear to auscultation bilaterally; No wheeze  HEART: Regular rate and rhythm; No murmurs, rubs, or gallops  ABDOMEN: Soft, Nontender, Nondistended; Bowel sounds present  EXTREMITIES:  2+ Peripheral Pulses, No clubbing, cyanosis, or edema  PSYCH: AAOx3  NEUROLOGY: non-focal  SKIN: No rashes or lesions    LABS:                    Psychiatry Section:  Psychiatric Summary/[facility] admitting diagnosis: 34 yo M, single, disabled, non-caregiver, resides at a [facility], with psych h/o moderate ID, ASD, impulse control disorder, factitious disorder, HAVEN, mood disorders, PTSD and ADHD, multiple past psych admissions (last known [facility] 6/13/23-present), longstanding h/o SIB (head banging, cutting), h/o endorsing SI, h/o aggression toward staff at , PMH significant for asthma, DM, urinary retention, GERD, BPH, hypothyroidism, HLD, HTN, and b/l myopia, who was admitted to [facility] ML4 for self inflicted laceration to abdomen, endorsing CAH to hurt himself and staff at group home. Pt transferred to [facility] and admitted to medicine on 6/23/23 for lethargy, pneumonia. Psychiatry consulted and now being re-admitted to [facility] for further management of SI, mood sx. On arrival back to [facility] this afternoon pt presenting with tachycardia in the 120s/130s and complaining of chest pain, which is non consistent with patient's recent presentation. Transferring to the ED for further workup.      Psychiatric Recommendations:    Observation status (check one):   (x) Constant Observation  ( ) Enhanced care  ( ) Routine checks    Risk Status (check all that apply if present):  ( x) at risk for suicide/self-injury  ( ) at risk for aggressive behavior  ( ) at risk for elopement  ( ) other risk:

## 2023-06-28 NOTE — BH CONSULTATION LIAISON PROGRESS NOTE - NSBHCONSULTMEDAGITATION_PSY_A_CORE FT
Thorazine 50 mg PO/IM q6h ; Ativan 2 mg PO/IM/IV q6h for agitation

## 2023-06-28 NOTE — DISCHARGE NOTE NURSING/CASE MANAGEMENT/SOCIAL WORK - NSDCVIVACCINE_GEN_ALL_CORE_FT
Tdap; 20-Dec-2018 12:21; Lo Anaya (RN); [employer]; [id number] (Exp. Date: 02-Nov-2020); IntraMuscular; Deltoid Left.; 0.5 milliLiter(s); VIS (VIS Published: 09-May-2013, VIS Presented: 20-Dec-2018);   Tdap; 26-Mar-2019 18:59; Shavon Barrios (RN); [employer]; [id number] (Exp. Date: 26-Feb-2021); IntraMuscular; Deltoid Left.; 0.5 milliLiter(s); VIS (VIS Published: 09-May-2013, VIS Presented: 26-Mar-2019);   Tdap; 01-Dec-2021 09:35; Zamzam Coulter (RN); [employer]; [id number] (Exp. Date: 09-Sep-2023); IntraMuscular; Deltoid Left.; 0.5 milliLiter(s); VIS (VIS Published: 09-May-2013, VIS Presented: 01-Dec-2021);   Tdap; 21-Jul-2022 01:28; Rose Hancock (RN); [employer]; [id number] (Exp. Date: 14-Oct-2024); IntraMuscular; Deltoid Right.; 0.5 milliLiter(s); VIS (VIS Published: 09-May-2013, VIS Presented: 21-Jul-2022);   Tdap; 12-Jun-2023 21:01; Diann Paul (RN); [employer]; [id number] (Exp. Date: 08-Mar-2025); IntraMuscular; Deltoid Left.; 0.5 milliLiter(s); VIS (VIS Published: 09-May-2013, VIS Presented: 12-Jun-2023);

## 2023-06-28 NOTE — ED ADULT NURSE NOTE - OBJECTIVE STATEMENT
Caregiver at bedside. Patient came in with the complaints of left sided chest pain with sob . Patient is placed on cardiac monitor with continues pulse ox. Awaiting for MD to see and further orders. Nursing care continues

## 2023-06-28 NOTE — BH INPATIENT PSYCHIATRY ASSESSMENT NOTE - CURRENT MEDICATION
MEDICATIONS  (STANDING):  atorvastatin 10 milliGRAM(s) Oral at bedtime  bacitracin   Ointment 1 Application(s) Topical two times a day  clonazePAM  Tablet 1 milliGRAM(s) Oral daily  divalproex DR 1500 milliGRAM(s) Oral at bedtime  gabapentin 400 milliGRAM(s) Oral three times a day  glucagon  Injectable 1 milliGRAM(s) IntraMuscular once  insulin lispro (ADMELOG) corrective regimen sliding scale   SubCutaneous three times a day before meals  levothyroxine 50 MICROGram(s) Oral daily  loratadine 10 milliGRAM(s) Oral daily  melatonin. 3 milliGRAM(s) Oral at bedtime  mirtazapine 30 milliGRAM(s) Oral at bedtime  pantoprazole    Tablet 40 milliGRAM(s) Oral before breakfast  risperiDONE   Tablet 4 milliGRAM(s) Oral two times a day  tamsulosin 0.4 milliGRAM(s) Oral at bedtime    MEDICATIONS  (PRN):  dextrose Oral Gel 15 Gram(s) Oral once PRN Blood Glucose LESS THAN 70 milliGRAM(s)/deciliter  guaiFENesin Oral Liquid (Sugar-Free) 200 milliGRAM(s) Oral every 6 hours PRN cough  senna 2 Tablet(s) Oral at bedtime PRN constipation   MEDICATIONS  (STANDING):  atorvastatin 10 milliGRAM(s) Oral at bedtime  bacitracin   Ointment 1 Application(s) Topical two times a day  clonazePAM  Tablet 1 milliGRAM(s) Oral daily  divalproex DR 1500 milliGRAM(s) Oral at bedtime  gabapentin 400 milliGRAM(s) Oral three times a day  glucagon  Injectable 1 milliGRAM(s) IntraMuscular once  insulin lispro (ADMELOG) corrective regimen sliding scale   SubCutaneous three times a day before meals  levothyroxine 50 MICROGram(s) Oral daily  loratadine 10 milliGRAM(s) Oral daily  melatonin. 3 milliGRAM(s) Oral at bedtime  mirtazapine 30 milliGRAM(s) Oral at bedtime  pantoprazole    Tablet 40 milliGRAM(s) Oral before breakfast  risperiDONE   Tablet 4 milliGRAM(s) Oral two times a day  tamsulosin 0.4 milliGRAM(s) Oral at bedtime    MEDICATIONS  (PRN):  chlorproMAZINE    Injectable 50 milliGRAM(s) IntraMuscular once PRN severe agitation  dextrose Oral Gel 15 Gram(s) Oral once PRN Blood Glucose LESS THAN 70 milliGRAM(s)/deciliter  guaiFENesin Oral Liquid (Sugar-Free) 200 milliGRAM(s) Oral every 6 hours PRN cough  LORazepam     Tablet 2 milliGRAM(s) Oral every 6 hours PRN anxiety/agitation  LORazepam   Injectable 2 milliGRAM(s) IntraMuscular once PRN severe anxiety/agitation  senna 2 Tablet(s) Oral at bedtime PRN constipation

## 2023-06-28 NOTE — BH INPATIENT PSYCHIATRY ASSESSMENT NOTE - NSBHMETABOLIC_PSY_ALL_CORE_FT
BMI: BMI (kg/m2): 41.3 (06-28-23 @ 16:38)  HbA1c: A1C with Estimated Average Glucose Result: 6.7 % (06-24-23 @ 06:10)    Glucose: POCT Blood Glucose.: 206 mg/dL (06-28-23 @ 12:19)    BP: --  Lipid Panel: Date/Time: 06-24-23 @ 06:10  Cholesterol, Serum: 118  Direct LDL: --  HDL Cholesterol, Serum: 25  Total Cholesterol/HDL Ration Measurement: --  Triglycerides, Serum: 155   BMI: BMI (kg/m2): 41.3 (06-28-23 @ 16:38)  HbA1c: A1C with Estimated Average Glucose Result: 6.7 % (06-24-23 @ 06:10)    Glucose: POCT Blood Glucose.: 198 mg/dL (06-28-23 @ 20:10)    BP: --  Lipid Panel: Date/Time: 06-24-23 @ 06:10  Cholesterol, Serum: 118  Direct LDL: --  HDL Cholesterol, Serum: 25  Total Cholesterol/HDL Ration Measurement: --  Triglycerides, Serum: 155

## 2023-06-28 NOTE — BH CONSULTATION LIAISON PROGRESS NOTE - NSBHATTESTBILLING_PSY_A_CORE
[id number] OBS or IP - moderate complexity OR 35-49 mins
[id number] OBS or IP - moderate complexity OR 35-49 mins

## 2023-06-28 NOTE — BH CONSULTATION LIAISON PROGRESS NOTE - CURRENT MEDICATION
MEDICATIONS  (STANDING):  atorvastatin 10 milliGRAM(s) Oral at bedtime  clonazePAM  Tablet 1 milliGRAM(s) Oral daily  dextrose 5%. 1000 milliLiter(s) (100 mL/Hr) IV Continuous <Continuous>  dextrose 5%. 1000 milliLiter(s) (50 mL/Hr) IV Continuous <Continuous>  dextrose 50% Injectable 25 Gram(s) IV Push once  dextrose 50% Injectable 12.5 Gram(s) IV Push once  dextrose 50% Injectable 25 Gram(s) IV Push once  divalproex DR 1500 milliGRAM(s) Oral at bedtime  enoxaparin Injectable 40 milliGRAM(s) SubCutaneous every 12 hours  gabapentin 400 milliGRAM(s) Oral three times a day  glucagon  Injectable 1 milliGRAM(s) IntraMuscular once  insulin lispro (ADMELOG) corrective regimen sliding scale   SubCutaneous three times a day before meals  lactated ringers. 1000 milliLiter(s) (100 mL/Hr) IV Continuous <Continuous>  levothyroxine 50 MICROGram(s) Oral daily  loratadine 10 milliGRAM(s) Oral daily  mirtazapine 30 milliGRAM(s) Oral at bedtime  pantoprazole    Tablet 40 milliGRAM(s) Oral before breakfast  risperiDONE   Tablet 4 milliGRAM(s) Oral two times a day  senna 2 Tablet(s) Oral at bedtime  tamsulosin 0.4 milliGRAM(s) Oral at bedtime    MEDICATIONS  (PRN):  acetaminophen     Tablet .. 650 milliGRAM(s) Oral every 6 hours PRN Temp greater or equal to 38C (100.4F), Mild Pain (1 - 3)  aluminum hydroxide/magnesium hydroxide/simethicone Suspension 30 milliLiter(s) Oral every 4 hours PRN Dyspepsia  benzocaine/menthol Lozenge 1 Lozenge Oral three times a day PRN Sore Throat  chlorproMAZINE    Injectable 50 milliGRAM(s) IntraMuscular every 6 hours PRN if not tolerating PO  chlorproMAZINE    Tablet 50 milliGRAM(s) Oral every 6 hours PRN agitation  dextrose Oral Gel 15 Gram(s) Oral once PRN Blood Glucose LESS THAN 70 milliGRAM(s)/deciliter  guaiFENesin Oral Liquid (Sugar-Free) 200 milliGRAM(s) Oral every 6 hours PRN Cough  LORazepam     Tablet 2 milliGRAM(s) Oral every 6 hours PRN Agitation  LORazepam   Injectable 2 milliGRAM(s) IntraMuscular every 6 hours PRN if not tolerating PO  melatonin 3 milliGRAM(s) Oral at bedtime PRN Insomnia  ondansetron Injectable 4 milliGRAM(s) IV Push every 8 hours PRN Nausea and/or Vomiting  
MEDICATIONS  (STANDING):  atorvastatin 10 milliGRAM(s) Oral at bedtime  cefTRIAXone   IVPB 1000 milliGRAM(s) IV Intermittent every 24 hours  clonazePAM  Tablet 1 milliGRAM(s) Oral daily  dextrose 5%. 1000 milliLiter(s) (100 mL/Hr) IV Continuous <Continuous>  dextrose 5%. 1000 milliLiter(s) (50 mL/Hr) IV Continuous <Continuous>  dextrose 50% Injectable 12.5 Gram(s) IV Push once  dextrose 50% Injectable 25 Gram(s) IV Push once  dextrose 50% Injectable 25 Gram(s) IV Push once  divalproex DR 1500 milliGRAM(s) Oral at bedtime  enoxaparin Injectable 40 milliGRAM(s) SubCutaneous every 12 hours  gabapentin 400 milliGRAM(s) Oral three times a day  glucagon  Injectable 1 milliGRAM(s) IntraMuscular once  insulin lispro (ADMELOG) corrective regimen sliding scale   SubCutaneous three times a day before meals  lactated ringers. 1000 milliLiter(s) (100 mL/Hr) IV Continuous <Continuous>  levothyroxine 50 MICROGram(s) Oral daily  loratadine 10 milliGRAM(s) Oral daily  mirtazapine 30 milliGRAM(s) Oral at bedtime  pantoprazole    Tablet 40 milliGRAM(s) Oral before breakfast  risperiDONE   Tablet 4 milliGRAM(s) Oral two times a day  senna 2 Tablet(s) Oral at bedtime  tamsulosin 0.4 milliGRAM(s) Oral at bedtime    MEDICATIONS  (PRN):  acetaminophen     Tablet .. 650 milliGRAM(s) Oral every 6 hours PRN Temp greater or equal to 38C (100.4F), Mild Pain (1 - 3)  aluminum hydroxide/magnesium hydroxide/simethicone Suspension 30 milliLiter(s) Oral every 4 hours PRN Dyspepsia  benzocaine/menthol Lozenge 1 Lozenge Oral three times a day PRN Sore Throat  chlorproMAZINE    Injectable 50 milliGRAM(s) IntraMuscular every 6 hours PRN if not tolerating PO  chlorproMAZINE    Tablet 50 milliGRAM(s) Oral every 6 hours PRN agitation  dextrose Oral Gel 15 Gram(s) Oral once PRN Blood Glucose LESS THAN 70 milliGRAM(s)/deciliter  guaiFENesin Oral Liquid (Sugar-Free) 200 milliGRAM(s) Oral every 6 hours PRN Cough  LORazepam     Tablet 2 milliGRAM(s) Oral every 6 hours PRN Agitation  LORazepam   Injectable 2 milliGRAM(s) IntraMuscular every 6 hours PRN if not tolerating PO  melatonin 3 milliGRAM(s) Oral at bedtime PRN Insomnia  ondansetron Injectable 4 milliGRAM(s) IV Push every 8 hours PRN Nausea and/or Vomiting  
MEDICATIONS  (STANDING):  atorvastatin 10 milliGRAM(s) Oral at bedtime  clonazePAM  Tablet 1 milliGRAM(s) Oral daily  dextrose 5%. 1000 milliLiter(s) (100 mL/Hr) IV Continuous <Continuous>  dextrose 5%. 1000 milliLiter(s) (50 mL/Hr) IV Continuous <Continuous>  dextrose 50% Injectable 25 Gram(s) IV Push once  dextrose 50% Injectable 25 Gram(s) IV Push once  dextrose 50% Injectable 12.5 Gram(s) IV Push once  divalproex DR 1500 milliGRAM(s) Oral at bedtime  enoxaparin Injectable 40 milliGRAM(s) SubCutaneous every 12 hours  gabapentin 400 milliGRAM(s) Oral three times a day  glucagon  Injectable 1 milliGRAM(s) IntraMuscular once  insulin lispro (ADMELOG) corrective regimen sliding scale   SubCutaneous three times a day before meals  lactated ringers. 1000 milliLiter(s) (100 mL/Hr) IV Continuous <Continuous>  levothyroxine 50 MICROGram(s) Oral daily  loratadine 10 milliGRAM(s) Oral daily  mirtazapine 30 milliGRAM(s) Oral at bedtime  pantoprazole    Tablet 40 milliGRAM(s) Oral before breakfast  risperiDONE   Tablet 4 milliGRAM(s) Oral two times a day  senna 2 Tablet(s) Oral at bedtime  tamsulosin 0.4 milliGRAM(s) Oral at bedtime    MEDICATIONS  (PRN):  acetaminophen     Tablet .. 650 milliGRAM(s) Oral every 6 hours PRN Temp greater or equal to 38C (100.4F), Mild Pain (1 - 3)  aluminum hydroxide/magnesium hydroxide/simethicone Suspension 30 milliLiter(s) Oral every 4 hours PRN Dyspepsia  benzocaine/menthol Lozenge 1 Lozenge Oral three times a day PRN Sore Throat  chlorproMAZINE    Injectable 50 milliGRAM(s) IntraMuscular every 6 hours PRN if not tolerating PO  chlorproMAZINE    Tablet 50 milliGRAM(s) Oral every 6 hours PRN agitation  dextrose Oral Gel 15 Gram(s) Oral once PRN Blood Glucose LESS THAN 70 milliGRAM(s)/deciliter  guaiFENesin Oral Liquid (Sugar-Free) 200 milliGRAM(s) Oral every 6 hours PRN Cough  LORazepam     Tablet 2 milliGRAM(s) Oral every 6 hours PRN Agitation  LORazepam   Injectable 2 milliGRAM(s) IntraMuscular every 6 hours PRN if not tolerating PO  melatonin 3 milliGRAM(s) Oral at bedtime PRN Insomnia  ondansetron Injectable 4 milliGRAM(s) IV Push every 8 hours PRN Nausea and/or Vomiting

## 2023-06-28 NOTE — BH CONSULTATION LIAISON PROGRESS NOTE - NSBHCHARTREVIEWVS_PSY_A_CORE FT
Vital Signs Last 24 Hrs  T(C): 36.9 (28 Jun 2023 12:03), Max: 36.9 (28 Jun 2023 12:03)  T(F): 98.5 (28 Jun 2023 12:03), Max: 98.5 (28 Jun 2023 12:03)  HR: 100 (28 Jun 2023 12:03) (86 - 100)  BP: 131/85 (28 Jun 2023 12:03) (114/87 - 138/86)  BP(mean): --  RR: 18 (28 Jun 2023 12:03) (18 - 19)  SpO2: 94% (28 Jun 2023 12:03) (94% - 96%)    Parameters below as of 28 Jun 2023 12:03  Patient On (Oxygen Delivery Method): room air    
Vital Signs Last 24 Hrs  T(C): 36.3 (26 Jun 2023 13:24), Max: 36.9 (25 Jun 2023 21:46)  T(F): 97.3 (26 Jun 2023 13:24), Max: 98.4 (25 Jun 2023 21:46)  HR: 87 (26 Jun 2023 13:24) (85 - 91)  BP: 128/76 (26 Jun 2023 13:24) (125/76 - 138/90)  BP(mean): --  RR: 19 (26 Jun 2023 13:24) (18 - 19)  SpO2: 94% (26 Jun 2023 13:24) (93% - 100%)    Parameters below as of 26 Jun 2023 13:24  Patient On (Oxygen Delivery Method): room air    
Vital Signs Last 24 Hrs  T(C): 36.6 (27 Jun 2023 04:31), Max: 36.8 (26 Jun 2023 21:11)  T(F): 97.8 (27 Jun 2023 04:31), Max: 98.3 (26 Jun 2023 21:11)  HR: 81 (27 Jun 2023 04:31) (81 - 89)  BP: 124/77 (27 Jun 2023 04:31) (109/89 - 128/76)  BP(mean): --  RR: 19 (27 Jun 2023 04:31) (19 - 19)  SpO2: 97% (27 Jun 2023 04:31) (94% - 100%)    Parameters below as of 27 Jun 2023 04:31  Patient On (Oxygen Delivery Method): room air

## 2023-06-28 NOTE — ED ADULT NURSE NOTE - CHIEF COMPLAINT QUOTE
[address]    BRIEF OPERATIVE NOTE    Date of Procedure: 8/1/2017   Preoperative Diagnosis: THORACIC AND LUMBAR STENOSIS  Postoperative Diagnosis: THORACIC AND LUMBAR STENOSIS    Procedure: Procedure(s):  T8-S1 THORACOLUMBAR  LAMINECTOMY WITH T6-L1 POSTERIOR SPINAL FUSION/ALLOGRAFT   Surgeon: Dr. Raya Kearney  Assistant(s): Kodak Mcgraw PA-C  Anesthesia: General   Estimated Blood Loss: 1200cc  Specimens: * No specimens in log *   Findings: Stenosis  Complications: None  Implants:   Implant Name Type Inv.  Item Serial No.  Lot No. LRB No. Used Action   GRAFT BNE PTTY 10ML -- OSTEOAMP - [id number]  GRAFT BNE PTTY 10ML -- OSTEOAMP JCB--0026 [employer] N/A N/A 1 Implanted   GRAFT BNE GRAN DBM 2-4MM 20ML -- OSTEOAMP - [id number]--[id number]  GRAFT BNE GRAN DBM 2-4MM 20ML -- OSTEOAMP TOU--[id number] [employer] N/A N/A 1 Implanted   GRAFT BNE PTTY 10ML -- OSTEOAMP - [id number]--0025  GRAFT BNE PTTY 10ML -- OSTEOAMP OPO--0025 [employer] N/A N/A 1 Implanted   GRAFT BNE GRAN DBM 2-4MM 20ML -- OSTEOAMP - [id number]--0049  GRAFT BNE GRAN DBM 2-4MM 20ML -- OSTEOAMP IPH--0049 [employer] N/A N/A 1 Implanted   SCR SPNE PRE-ASSBL 6.5X45MM -- CREO 5.5MM - SN/A  SCR SPNE PRE-ASSBL 6.5X45MM -- CREO 5.5MM N/A [employer] N/A N/A 6 Implanted   CAP SPNE LCK CREO 5.5MM --  - SN/A  CAP SPNE LCK CREO 5.5MM --  N/A [employer] N/A N/A 16 Implanted   GLOBUS 500MM JAI Jai  N/A [employer] N/A N/A 1 Implanted   GLOBUS 6.5X40MM SCREW Screw   N/A Kvng Duarte 74 N/A N/A 10 Implanted       Sourav Shepherd [place]  8/1/2017
c/o CP since d/c this afternoon- tachy to 120's- no additional complaints

## 2023-06-28 NOTE — PROGRESS NOTE ADULT - PROBLEM SELECTOR PLAN 3
- ss  - poc checks   - cc diet

## 2023-06-28 NOTE — BH INPATIENT PSYCHIATRY ASSESSMENT NOTE - NSBHATTESTCOMMENTATTENDFT_PSY_A_CORE
34 yo M, single, disabled, non-caregiver, resides at a [facility] , with psych h/o moderate ID, ASD, impulse control disorder, factitious disorder, HAVEN, mood disorders, PTSD and ADHD, multiple past psych admissions (last known [facility] 6/13/23-present), longstanding h/o SIB (head banging, cutting), h/o endorsing SI, h/o aggression toward staff at , PMH significant for asthma, DM, urinary retention, GERD, BPH, hypothyroidism, HLD, HTN, and b/l myopia, who was admitted to [facility] ML4 for self inflicted laceration to abdomen, endorsing CAH to hurt himself and staff at group home. Pt transferred to [facility] and admitted to medicine on 6/23/23 for lethargy, pneumonia. Psychiatry consulted and now being re-admitted to [facility] for further management of SI, mood sx. On arrival back to [facility] this afternoon pt presenting with tachycardia in the 120s/130s and complaining of chest pain, which is non consistent with patient's recent presentation. Transferring to the ED for further workup.

## 2023-06-28 NOTE — BH PATIENT PROFILE - BRADEN SCORE
-likely multifactorial: patient with right pleural effusion, severe pulmonary hypertension, HFpEF, VANITA, COPD  -s/p chest tube, CT surgery is following  -s/p intubation, extubated.  Supplemental oxygen as needed.  Per pulm, encourage outpt CPAP use.  -on symbicort, PRN nebs  -on lasix, followed by cardiology  -outpt pulm f/u Supplemental O2 in use  No evidence resp failure  Further care as per MICU team (border in CTICU) 23

## 2023-06-28 NOTE — PROGRESS NOTE ADULT - ASSESSMENT
33 year old male comes into the ED from [facility] (originally from [facility]) for lethargy ongoing for 2-3 days being treated for pneumonia. Patients clinical presentation continues to improve.

## 2023-06-28 NOTE — BH PATIENT PROFILE - HOME MEDICATIONS
clonazePAM 1 mg oral tablet , 1 tab(s) orally once a day  guaiFENesin 100 mg/5 mL oral liquid , 10 milliliter(s) orally every 6 hours As needed Cough  bacitracin 500 units/g topical ointment , 1 Apply topically to affected area 2 times a day  divalproex sodium 500 mg oral delayed release tablet , 3 tab(s) orally once a day (at bedtime)  mirtazapine 30 mg oral tablet , 1 tab(s) orally once a day (at bedtime)  levothyroxine 50 mcg (0.05 mg) oral tablet , 1 tab(s) orally once a day  pantoprazole 40 mg oral delayed release tablet , 1 tab(s) orally once a day  Melatonin 5 mg oral tablet , 1 tab(s) orally once a day (at bedtime)  senna leaf extract oral tablet , 2 tab(s) orally once a day (at bedtime)  ferrous sulfate 325 mg (65 mg elemental iron) oral tablet , 1 tab(s) orally once a day (at bedtime)  risperiDONE 4 mg oral tablet , 1 tab(s) orally 2 times a day  atorvastatin 10 mg oral tablet , 1 tab(s) orally once a day (at bedtime)  loratadine 10 mg oral tablet , 1 tab(s) orally once a day  metFORMIN 1000 mg oral tablet , 1 tab(s) orally 2 times a day (with meals)  gabapentin 400 mg oral capsule , 1 cap(s) orally 2 times a day  guanFACINE 2 mg oral tablet , 1 tab(s) orally once a day  tamsulosin 0.4 mg oral capsule , 1 cap(s) orally once a day (at bedtime)

## 2023-06-28 NOTE — ED PROVIDER NOTE - CLINICAL SUMMARY MEDICAL DECISION MAKING FREE TEXT BOX
33-year-old male past medical history of intellectual disability obesity asthma autism impulse control disorder hyperlipidemia presenting from [facility] after an admission for pneumonia.  Now complaining of chest pain and is tachycardic hypoxic and tachypneic.  Differential includes but is not limited to pulmonary realism, pneumothorax, pneumonia, pulmonary edema.  Patient will need laboratory studies and CT angio to help further differentiate.  If vital signs do not normalize patient will need to be readmitted as cannot go to [facility].

## 2023-06-28 NOTE — BH INPATIENT PSYCHIATRY ASSESSMENT NOTE - NSBHASSESSSUMMFT_PSY_ALL_CORE
Pt is a 34 yo M, single, disabled, non-caregiver, resides at a [facility] , with psych h/o moderate ID, ASD, impulse control disorder, factitious disorder, HAVEN, mood disorders, PTSD and ADHD, multiple past psych admissions (last known [facility] 6/13/23-present), longstanding h/o SIB (head banging, cutting), h/o endorsing SI, h/o aggression toward staff at , PMH significant for asthma, DM, urinary retention, GERD, BPH, hypothyroidism, HLD, HTN, and b/l myopia, who was admitted to [facility] ML4 for self inflicted laceration to abdomen, endorsing CAH to hurt himself and staff at group home. Pt transferred to [facility] and admitted to medicine on 6/23/23 for lethargy, pneumonia. Psychiatry consulted and now being re-admitted to [facility] for further management of SI, mood sx.     Patient presents with elevated mood and ruminating on somatic complaints. Patient was tachycardic but improving on reassessment and otherwise vitally stable. With his initial admission s/p SA and recent suicidal thoughts, patient requires inpatient hospitalization for safety and stabilization. He denies current SI/HI/I/P and does not require 1:1 CO. Patient's presentation is concerning for MDD vs factitious d/o vs behavioral dysregulation 2/2 ASD. Will continue current medication regimen as below.    Plan:  1. Legals: admit on 9.27 status  2. Safety: routine observation, denies SI/HI/I/P on the unit. Ativan 2 mg/ Thorazine 50 mg/Benadryl 50 mg PO q6h/IM PRN or agitation  3. Psychiatric: Risperdal 4 mg qHS  	         Depakote 1500 mg qHS  	         Mirtazapine 30 mg qHS                       Clonazepam 1 mg daily                        Gabapentin 400 mg tid                        Melatonin 3 mg qHS  4. Group, milieu, individual therapy as appropriate.  5. Medical: Atorvastatin 10 mg daily, Levothyroxine 50 mcg daily, Loratadine 10 mg daily, Pantoprazole 40 mg daily, Tamsulosin 0.4 mg qHS, senna 2 tab prn  6. Dispo: pending clinical improvement. Patient continues to require inpatient hospitalization for stabilization and safety.

## 2023-06-28 NOTE — ED PROVIDER NOTE - OBJECTIVE STATEMENT
33-year-old male past medical history of intellectual disability obesity asthma autism impulse control disorder and hyperlipidemia presenting as an inpatient from [facility].  The patient had been admitted there for self injures behavior where he had developed pneumonia.  He had hospitalized for a few days where he received antibiotic therapy and was managed by the inpatient psychiatry team.  He was discharged back to [facility] this afternoon where he developed sudden onset of sharp left-sided chest pain.  It is nonradiating but pleuritic in nature.  He does feel short of breath.  Has never had similar pains in the past.  Here patient is found to be tachycardic tachypneic and hypoxic.

## 2023-06-28 NOTE — BH CONSULTATION LIAISON PROGRESS NOTE - NSBHFUPINTERVALHXFT_PSY_A_CORE
no PRNs, patient compliant, knows he is awaiting H transfer, discussed his tattoos, poor insight into illness overall 
Chart reviewed. Adherent w/ medications. No PRNs overnight. In fair behavioral control per PCA. Patient asks if he is going to [place]. Says he wants to go home. Abdominal laceration discussed. Patient does not recognize severity and dangerousness of behavior. Reports fair mood. No SI, HI, AH.
Chart reviewed. Adherent w/ medications. No PRNs overnight. Patient found asleep in bed. No recent agitation or self-injurious behavior per PCA. Patient struggles to wake up. Says he feels okay but does not want to return to [facility]. Would prefer to go home. Denies SI, HI, AH.

## 2023-06-28 NOTE — BH CONSULTATION LIAISON PROGRESS NOTE - NSICDXBHSECONDARYDX_PSY_ALL_CORE
Pneumonia   J18.9  Autism   F84.0  

## 2023-06-28 NOTE — PROGRESS NOTE ADULT - ATTENDING COMMENTS
Briefly, this is a 33 year old male presenting from [facility] (originally from [facility]) with lethargy ongoing for 2-3 days currently being treated for pneumonia.     Patient seen and examined at bedside. Resting comfortably answering questions appropriately. eager to go back. denies any complaints    Currently being treated for pneumonia-pt is now s/p completion of antibiotics, afebrile on RA  Urine cultures negative,   Patient clinically improving  C/w psychiatric meds,  following    medically stable for discharge back to [facility] when bed available    Remainder of plan as outlined above.  Case and plan of care discussed with HS1 .    dispo planning-discharge to [facility] today    Adrianne Regan MD  Medicine Hospitalist
Briefly, this is a 33 year old male presenting from [facility] (originally from [facility]) with lethargy ongoing for 2-3 days currently being treated for pneumonia.     Patient seen and examined at bedside. Resting comfortably answering questions appropriately. eager to go back. denies any complaints    Currently being treated for pneumonia-pt is now s/p completion of antibiotics  Urine cultures negative, no sputum cultures obtain  Patient clinically improving  C/w psychiatric meds,  following    medically stable for discharge back to [facility] when bed available    Remainder of plan as outlined above.  Case and plan of care discussed with HS1 .    dispo planning    Adrianne Regan MD  Medicine Hospitalist
Briefly, this is a 33 year old male presenting from [facility] (originally from [facility]) with lethargy ongoing for 2-3 days currently being treated for pneumonia.     Patient seen and examined at bedside. Resting comfortably. Denies any acute complaints. Per PCA at bedside, patient has been calm and cooperative. VSS.    Currently being treated for pneumonia. C/w CTX (azithro d/c'd in setting of negative legionella).   F/u blood/sputum cultures  C/w psychiatric meds,  following    Remainder of plan as outlined above.  Case and plan of care discussed with HS1
Briefly, this is a 33 year old male presenting from [facility] (originally from [facility]) with lethargy ongoing for 2-3 days currently being treated for pneumonia.     Patient seen and examined at bedside. Resting comfortably but refusing to wake up. moving all extremities. on RA    Currently being treated for pneumonia. C/w CTX (azithro d/c'd in setting of negative legionella).   Urine cultures negative, no sputum cultures obtain  Patient clinically improving  Can switch to PO abx on discharge. would actually switch to vantin  C/w psychiatric meds,  following    Remainder of plan as outlined above.  Case and plan of care discussed with HS1 .    dispo planning    Adrianne Regan MD  Medicine Hospitalist
Briefly, this is a 33 year old male presenting from [facility] (originally from [facility]) with lethargy ongoing for 2-3 days currently being treated for pneumonia.     Patient seen and examined at bedside. Resting comfortably. Denies any acute complaints. VSS.    Currently being treated for pneumonia. C/w CTX (azithro d/c'd in setting of negative legionella).   Urine cultures negative, no sputum cultures obtain  Patient clinically improving  Can switch to PO abx on discharge. Levofloxacin (if QTc WNL) or Augmentin  C/w psychiatric meds,  following    Remainder of plan as outlined above.  Case and plan of care discussed with HS1 .

## 2023-06-28 NOTE — BH INPATIENT PSYCHIATRY ASSESSMENT NOTE - RISK ASSESSMENT
Patient is at elevated risk.  Risk factors include: single, male, recent suicide attempt and suicidal statements, active mood episode with impulsivity, difficulty expressing emotions, limited insight into symptoms. Chronic risk factors for suicide include: h/o prior psychiatric admissions, d/o ASD, HAVEN, MTSD and mood disorder, prior suicide attempts, h/o NSSIB.   Protective factors include: Young and residential stability.

## 2023-06-28 NOTE — PROGRESS NOTE ADULT - SUBJECTIVE AND OBJECTIVE BOX
PROGRESS NOTE:   Authored by Rey York MD  Patient is a 33y old  Male who presents with a chief complaint of Lethargy (27 Jun 2023 08:20)      SUBJECTIVE / OVERNIGHT EVENTS:  No acute events overnight.     ADDITIONAL REVIEW OF SYSTEMS:    MEDICATIONS  (STANDING):  atorvastatin 10 milliGRAM(s) Oral at bedtime  clonazePAM  Tablet 1 milliGRAM(s) Oral daily  dextrose 5%. 1000 milliLiter(s) (100 mL/Hr) IV Continuous <Continuous>  dextrose 5%. 1000 milliLiter(s) (50 mL/Hr) IV Continuous <Continuous>  dextrose 50% Injectable 25 Gram(s) IV Push once  dextrose 50% Injectable 12.5 Gram(s) IV Push once  dextrose 50% Injectable 25 Gram(s) IV Push once  divalproex DR 1500 milliGRAM(s) Oral at bedtime  enoxaparin Injectable 40 milliGRAM(s) SubCutaneous every 12 hours  gabapentin 400 milliGRAM(s) Oral three times a day  glucagon  Injectable 1 milliGRAM(s) IntraMuscular once  insulin lispro (ADMELOG) corrective regimen sliding scale   SubCutaneous three times a day before meals  lactated ringers. 1000 milliLiter(s) (100 mL/Hr) IV Continuous <Continuous>  levothyroxine 50 MICROGram(s) Oral daily  loratadine 10 milliGRAM(s) Oral daily  mirtazapine 30 milliGRAM(s) Oral at bedtime  pantoprazole    Tablet 40 milliGRAM(s) Oral before breakfast  risperiDONE   Tablet 4 milliGRAM(s) Oral two times a day  senna 2 Tablet(s) Oral at bedtime  tamsulosin 0.4 milliGRAM(s) Oral at bedtime    MEDICATIONS  (PRN):  acetaminophen     Tablet .. 650 milliGRAM(s) Oral every 6 hours PRN Temp greater or equal to 38C (100.4F), Mild Pain (1 - 3)  aluminum hydroxide/magnesium hydroxide/simethicone Suspension 30 milliLiter(s) Oral every 4 hours PRN Dyspepsia  benzocaine/menthol Lozenge 1 Lozenge Oral three times a day PRN Sore Throat  chlorproMAZINE    Injectable 50 milliGRAM(s) IntraMuscular every 6 hours PRN if not tolerating PO  chlorproMAZINE    Tablet 50 milliGRAM(s) Oral every 6 hours PRN agitation  dextrose Oral Gel 15 Gram(s) Oral once PRN Blood Glucose LESS THAN 70 milliGRAM(s)/deciliter  guaiFENesin Oral Liquid (Sugar-Free) 200 milliGRAM(s) Oral every 6 hours PRN Cough  LORazepam     Tablet 2 milliGRAM(s) Oral every 6 hours PRN Agitation  LORazepam   Injectable 2 milliGRAM(s) IntraMuscular every 6 hours PRN if not tolerating PO  melatonin 3 milliGRAM(s) Oral at bedtime PRN Insomnia  ondansetron Injectable 4 milliGRAM(s) IV Push every 8 hours PRN Nausea and/or Vomiting      CAPILLARY BLOOD GLUCOSE      POCT Blood Glucose.: 204 mg/dL (27 Jun 2023 21:37)  POCT Blood Glucose.: 167 mg/dL (27 Jun 2023 17:54)  POCT Blood Glucose.: 180 mg/dL (27 Jun 2023 12:05)  POCT Blood Glucose.: 150 mg/dL (27 Jun 2023 09:02)    I&O's Summary      PHYSICAL EXAM:  Vital Signs Last 24 Hrs  T(C): 36.2 (27 Jun 2023 21:07), Max: 36.6 (27 Jun 2023 13:52)  T(F): 97.2 (27 Jun 2023 21:07), Max: 97.9 (27 Jun 2023 13:52)  HR: 96 (27 Jun 2023 21:07) (86 - 96)  BP: 114/87 (27 Jun 2023 21:07) (114/87 - 138/86)  BP(mean): --  RR: 19 (27 Jun 2023 21:07) (19 - 19)  SpO2: 95% (27 Jun 2023 21:07) (95% - 96%)    Parameters below as of 27 Jun 2023 21:07  Patient On (Oxygen Delivery Method): room air        GENERAL: No apparent distress.   HEAD:  Atraumatic, Normocephalic  EYES: EOMI, PERRLA, conjunctiva and sclera clear  NECK: Supple, no lymphadenopathy, no elevated JVP  CHEST/LUNG: Clear to auscultation bilateral and symmetric; No wheezes, rales, or rhonchi  HEART: S1 and S2 normal. Regular rate and rhythm; No murmurs, rubs, or gallops  ABDOMEN: Soft, non-tender, non-distended; normal bowel sounds  EXTREMITIES:  2+ peripheral pulses b/l, No clubbing, cyanosis, or edema  NEUROLOGY: A&O x 3, no focal deficits  SKIN: No rashes or lesions    LABS:                      RADIOLOGY & ADDITIONAL TESTS:  Lab Results Reviewed   Imaging Reviewed  Electrocardiogram Reviewed

## 2023-06-28 NOTE — BH INPATIENT PSYCHIATRY ASSESSMENT NOTE - NSBHCHARTREVIEWVS_PSY_A_CORE FT
Vital Signs Last 24 Hrs  T(C): 36 (06-28-23 @ 16:38), Max: 36.9 (06-28-23 @ 12:03)  T(F): 96.8 (06-28-23 @ 16:38), Max: 98.5 (06-28-23 @ 12:03)  HR: 100 (06-28-23 @ 12:03) (96 - 100)  BP: 131/85 (06-28-23 @ 12:03) (114/87 - 131/85)  BP(mean): --  RR: 16 (06-28-23 @ 16:38) (16 - 19)  SpO2: 96% (06-28-23 @ 16:38) (94% - 96%)    Orthostatic VS  06-28-23 @ 16:38  Lying BP: --/-- HR: --  Sitting BP: 128/93 HR: 124  Standing BP: 132/82 HR: 125  Site: --  Mode: --   Vital Signs Last 24 Hrs  T(C): 36 (06-28-23 @ 16:38), Max: 36.9 (06-28-23 @ 12:03)  T(F): 96.8 (06-28-23 @ 16:38), Max: 98.5 (06-28-23 @ 12:03)  HR: 119 (06-28-23 @ 20:04) (96 - 119)  BP: 131/85 (06-28-23 @ 12:03) (114/87 - 131/85)  BP(mean): --  RR: 16 (06-28-23 @ 16:38) (16 - 19)  SpO2: 97% (06-28-23 @ 20:04) (94% - 97%)    Orthostatic VS  06-28-23 @ 16:38  Lying BP: --/-- HR: --  Sitting BP: 128/93 HR: 124  Standing BP: 132/82 HR: 125  Site: --  Mode: --

## 2023-06-29 DIAGNOSIS — F63.9 IMPULSE DISORDER, UNSPECIFIED: ICD-10-CM

## 2023-06-29 DIAGNOSIS — Z29.9 ENCOUNTER FOR PROPHYLACTIC MEASURES, UNSPECIFIED: ICD-10-CM

## 2023-06-29 DIAGNOSIS — E11.9 TYPE 2 DIABETES MELLITUS WITHOUT COMPLICATIONS: ICD-10-CM

## 2023-06-29 DIAGNOSIS — J18.9 PNEUMONIA, UNSPECIFIED ORGANISM: ICD-10-CM

## 2023-06-29 DIAGNOSIS — F84.0 AUTISTIC DISORDER: ICD-10-CM

## 2023-06-29 DIAGNOSIS — E03.9 HYPOTHYROIDISM, UNSPECIFIED: ICD-10-CM

## 2023-06-29 DIAGNOSIS — R07.9 CHEST PAIN, UNSPECIFIED: ICD-10-CM

## 2023-06-29 LAB
ALBUMIN SERPL ELPH-MCNC: 3.7 G/DL — SIGNIFICANT CHANGE UP (ref 3.3–5)
ALP SERPL-CCNC: 93 U/L — SIGNIFICANT CHANGE UP (ref 40–120)
ALT FLD-CCNC: 69 U/L — HIGH (ref 4–41)
ANION GAP SERPL CALC-SCNC: 12 MMOL/L — SIGNIFICANT CHANGE UP (ref 7–14)
ANISOCYTOSIS BLD QL: SLIGHT — SIGNIFICANT CHANGE UP
AST SERPL-CCNC: 45 U/L — HIGH (ref 4–40)
B PERT DNA SPEC QL NAA+PROBE: SIGNIFICANT CHANGE UP
B PERT+PARAPERT DNA PNL SPEC NAA+PROBE: SIGNIFICANT CHANGE UP
BASOPHILS # BLD AUTO: 0.08 K/UL — SIGNIFICANT CHANGE UP (ref 0–0.2)
BASOPHILS NFR BLD AUTO: 0.9 % — SIGNIFICANT CHANGE UP (ref 0–2)
BILIRUB SERPL-MCNC: <0.2 MG/DL — SIGNIFICANT CHANGE UP (ref 0.2–1.2)
BORDETELLA PARAPERTUSSIS (RAPRVP): SIGNIFICANT CHANGE UP
BUN SERPL-MCNC: 14 MG/DL — SIGNIFICANT CHANGE UP (ref 7–23)
C PNEUM DNA SPEC QL NAA+PROBE: SIGNIFICANT CHANGE UP
CALCIUM SERPL-MCNC: 9.4 MG/DL — SIGNIFICANT CHANGE UP (ref 8.4–10.5)
CHLORIDE SERPL-SCNC: 96 MMOL/L — LOW (ref 98–107)
CO2 SERPL-SCNC: 25 MMOL/L — SIGNIFICANT CHANGE UP (ref 22–31)
CREAT SERPL-MCNC: 0.9 MG/DL — SIGNIFICANT CHANGE UP (ref 0.5–1.3)
EGFR: 115 ML/MIN/1.73M2 — SIGNIFICANT CHANGE UP
EOSINOPHIL # BLD AUTO: 0.47 K/UL — SIGNIFICANT CHANGE UP (ref 0–0.5)
EOSINOPHIL NFR BLD AUTO: 5.2 % — SIGNIFICANT CHANGE UP (ref 0–6)
FLUAV SUBTYP SPEC NAA+PROBE: SIGNIFICANT CHANGE UP
FLUBV RNA SPEC QL NAA+PROBE: SIGNIFICANT CHANGE UP
GLUCOSE BLDC GLUCOMTR-MCNC: 222 MG/DL — HIGH (ref 70–99)
GLUCOSE SERPL-MCNC: 251 MG/DL — HIGH (ref 70–99)
HADV DNA SPEC QL NAA+PROBE: SIGNIFICANT CHANGE UP
HCOV 229E RNA SPEC QL NAA+PROBE: SIGNIFICANT CHANGE UP
HCOV HKU1 RNA SPEC QL NAA+PROBE: SIGNIFICANT CHANGE UP
HCOV NL63 RNA SPEC QL NAA+PROBE: SIGNIFICANT CHANGE UP
HCOV OC43 RNA SPEC QL NAA+PROBE: SIGNIFICANT CHANGE UP
HCT VFR BLD CALC: 43.7 % — SIGNIFICANT CHANGE UP (ref 39–50)
HGB BLD-MCNC: 14 G/DL — SIGNIFICANT CHANGE UP (ref 13–17)
HMPV RNA SPEC QL NAA+PROBE: SIGNIFICANT CHANGE UP
HPIV1 RNA SPEC QL NAA+PROBE: SIGNIFICANT CHANGE UP
HPIV2 RNA SPEC QL NAA+PROBE: SIGNIFICANT CHANGE UP
HPIV3 RNA SPEC QL NAA+PROBE: SIGNIFICANT CHANGE UP
HPIV4 RNA SPEC QL NAA+PROBE: SIGNIFICANT CHANGE UP
IANC: 4.75 K/UL — SIGNIFICANT CHANGE UP (ref 1.8–7.4)
LYMPHOCYTES # BLD AUTO: 2.2 K/UL — SIGNIFICANT CHANGE UP (ref 1–3.3)
LYMPHOCYTES # BLD AUTO: 24.4 % — SIGNIFICANT CHANGE UP (ref 13–44)
M PNEUMO DNA SPEC QL NAA+PROBE: SIGNIFICANT CHANGE UP
MANUAL SMEAR VERIFICATION: SIGNIFICANT CHANGE UP
MCHC RBC-ENTMCNC: 25.5 PG — LOW (ref 27–34)
MCHC RBC-ENTMCNC: 32 GM/DL — SIGNIFICANT CHANGE UP (ref 32–36)
MCV RBC AUTO: 79.5 FL — LOW (ref 80–100)
MICROCYTES BLD QL: SLIGHT — SIGNIFICANT CHANGE UP
MONOCYTES # BLD AUTO: 0.7 K/UL — SIGNIFICANT CHANGE UP (ref 0–0.9)
MONOCYTES NFR BLD AUTO: 7.8 % — SIGNIFICANT CHANGE UP (ref 2–14)
MYELOCYTES NFR BLD: 1.7 % — HIGH (ref 0–0)
NEUTROPHILS # BLD AUTO: 5.18 K/UL — SIGNIFICANT CHANGE UP (ref 1.8–7.4)
NEUTROPHILS NFR BLD AUTO: 57.4 % — SIGNIFICANT CHANGE UP (ref 43–77)
NT-PROBNP SERPL-SCNC: <36 PG/ML — SIGNIFICANT CHANGE UP
PLAT MORPH BLD: NORMAL — SIGNIFICANT CHANGE UP
PLATELET # BLD AUTO: 231 K/UL — SIGNIFICANT CHANGE UP (ref 150–400)
PLATELET COUNT - ESTIMATE: NORMAL — SIGNIFICANT CHANGE UP
POLYCHROMASIA BLD QL SMEAR: SLIGHT — SIGNIFICANT CHANGE UP
POTASSIUM SERPL-MCNC: 4.3 MMOL/L — SIGNIFICANT CHANGE UP (ref 3.5–5.3)
POTASSIUM SERPL-SCNC: 4.3 MMOL/L — SIGNIFICANT CHANGE UP (ref 3.5–5.3)
PROT SERPL-MCNC: 6.7 G/DL — SIGNIFICANT CHANGE UP (ref 6–8.3)
RAPID RVP RESULT: SIGNIFICANT CHANGE UP
RBC # BLD: 5.5 M/UL — SIGNIFICANT CHANGE UP (ref 4.2–5.8)
RBC # FLD: 13.6 % — SIGNIFICANT CHANGE UP (ref 10.3–14.5)
RBC BLD AUTO: ABNORMAL
RSV RNA SPEC QL NAA+PROBE: SIGNIFICANT CHANGE UP
RV+EV RNA SPEC QL NAA+PROBE: SIGNIFICANT CHANGE UP
SARS-COV-2 RNA SPEC QL NAA+PROBE: SIGNIFICANT CHANGE UP
SMUDGE CELLS # BLD: PRESENT — SIGNIFICANT CHANGE UP
SODIUM SERPL-SCNC: 133 MMOL/L — LOW (ref 135–145)
TROPONIN T, HIGH SENSITIVITY RESULT: 7 NG/L — SIGNIFICANT CHANGE UP
VARIANT LYMPHS # BLD: 2.6 % — SIGNIFICANT CHANGE UP (ref 0–6)
WBC # BLD: 9.02 K/UL — SIGNIFICANT CHANGE UP (ref 3.8–10.5)
WBC # FLD AUTO: 9.02 K/UL — SIGNIFICANT CHANGE UP (ref 3.8–10.5)

## 2023-06-29 PROCEDURE — 99223 1ST HOSP IP/OBS HIGH 75: CPT

## 2023-06-29 PROCEDURE — 99238 HOSP IP/OBS DSCHRG MGMT 30/<: CPT

## 2023-06-29 PROCEDURE — 71275 CT ANGIOGRAPHY CHEST: CPT | Mod: 26,MA

## 2023-06-29 RX ORDER — PIPERACILLIN AND TAZOBACTAM 4; .5 G/20ML; G/20ML
3.38 INJECTION, POWDER, LYOPHILIZED, FOR SOLUTION INTRAVENOUS EVERY 8 HOURS
Refills: 0 | Status: COMPLETED | OUTPATIENT
Start: 2023-06-29 | End: 2023-07-03

## 2023-06-29 RX ORDER — ENOXAPARIN SODIUM 100 MG/ML
40 INJECTION SUBCUTANEOUS EVERY 12 HOURS
Refills: 0 | Status: DISCONTINUED | OUTPATIENT
Start: 2023-06-29 | End: 2023-07-13

## 2023-06-29 RX ORDER — DEXTROSE 50 % IN WATER 50 %
12.5 SYRINGE (ML) INTRAVENOUS ONCE
Refills: 0 | Status: DISCONTINUED | OUTPATIENT
Start: 2023-06-29 | End: 2023-07-13

## 2023-06-29 RX ORDER — PIPERACILLIN AND TAZOBACTAM 4; .5 G/20ML; G/20ML
3.38 INJECTION, POWDER, LYOPHILIZED, FOR SOLUTION INTRAVENOUS ONCE
Refills: 0 | Status: COMPLETED | OUTPATIENT
Start: 2023-06-29 | End: 2023-06-29

## 2023-06-29 RX ORDER — IPRATROPIUM/ALBUTEROL SULFATE 18-103MCG
3 AEROSOL WITH ADAPTER (GRAM) INHALATION ONCE
Refills: 0 | Status: COMPLETED | OUTPATIENT
Start: 2023-06-29 | End: 2023-06-29

## 2023-06-29 RX ORDER — ACETAMINOPHEN 500 MG
650 TABLET ORAL EVERY 6 HOURS
Refills: 0 | Status: DISCONTINUED | OUTPATIENT
Start: 2023-06-29 | End: 2023-07-11

## 2023-06-29 RX ORDER — ONDANSETRON 8 MG/1
4 TABLET, FILM COATED ORAL EVERY 8 HOURS
Refills: 0 | Status: DISCONTINUED | OUTPATIENT
Start: 2023-06-29 | End: 2023-07-13

## 2023-06-29 RX ORDER — VANCOMYCIN HCL 1 G
1500 VIAL (EA) INTRAVENOUS EVERY 12 HOURS
Refills: 0 | Status: DISCONTINUED | OUTPATIENT
Start: 2023-06-29 | End: 2023-07-03

## 2023-06-29 RX ORDER — DEXTROSE 50 % IN WATER 50 %
25 SYRINGE (ML) INTRAVENOUS ONCE
Refills: 0 | Status: DISCONTINUED | OUTPATIENT
Start: 2023-06-29 | End: 2023-07-13

## 2023-06-29 RX ORDER — CHLORPROMAZINE HCL 10 MG
50 TABLET ORAL EVERY 6 HOURS
Refills: 0 | Status: DISCONTINUED | OUTPATIENT
Start: 2023-06-29 | End: 2023-07-13

## 2023-06-29 RX ORDER — CHLORPROMAZINE HCL 10 MG
50 TABLET ORAL ONCE
Refills: 0 | Status: COMPLETED | OUTPATIENT
Start: 2023-06-29 | End: 2023-06-29

## 2023-06-29 RX ORDER — VANCOMYCIN HCL 1 G
1000 VIAL (EA) INTRAVENOUS ONCE
Refills: 0 | Status: COMPLETED | OUTPATIENT
Start: 2023-06-29 | End: 2023-06-29

## 2023-06-29 RX ORDER — GABAPENTIN 400 MG/1
400 CAPSULE ORAL
Refills: 0 | Status: DISCONTINUED | OUTPATIENT
Start: 2023-06-29 | End: 2023-07-13

## 2023-06-29 RX ORDER — TAMSULOSIN HYDROCHLORIDE 0.4 MG/1
0.4 CAPSULE ORAL AT BEDTIME
Refills: 0 | Status: DISCONTINUED | OUTPATIENT
Start: 2023-06-29 | End: 2023-07-13

## 2023-06-29 RX ORDER — RISPERIDONE 4 MG/1
4 TABLET ORAL
Refills: 0 | Status: DISCONTINUED | OUTPATIENT
Start: 2023-06-29 | End: 2023-07-07

## 2023-06-29 RX ORDER — PANTOPRAZOLE SODIUM 20 MG/1
40 TABLET, DELAYED RELEASE ORAL
Refills: 0 | Status: DISCONTINUED | OUTPATIENT
Start: 2023-06-29 | End: 2023-07-13

## 2023-06-29 RX ORDER — MIRTAZAPINE 45 MG/1
30 TABLET, ORALLY DISINTEGRATING ORAL AT BEDTIME
Refills: 0 | Status: DISCONTINUED | OUTPATIENT
Start: 2023-06-29 | End: 2023-07-13

## 2023-06-29 RX ORDER — CLONAZEPAM 1 MG
1 TABLET ORAL DAILY
Refills: 0 | Status: DISCONTINUED | OUTPATIENT
Start: 2023-06-29 | End: 2023-07-06

## 2023-06-29 RX ORDER — FERROUS SULFATE 325(65) MG
325 TABLET ORAL AT BEDTIME
Refills: 0 | Status: DISCONTINUED | OUTPATIENT
Start: 2023-06-29 | End: 2023-07-13

## 2023-06-29 RX ORDER — LANOLIN ALCOHOL/MO/W.PET/CERES
5 CREAM (GRAM) TOPICAL AT BEDTIME
Refills: 0 | Status: DISCONTINUED | OUTPATIENT
Start: 2023-06-29 | End: 2023-06-29

## 2023-06-29 RX ORDER — LORATADINE 10 MG/1
10 TABLET ORAL DAILY
Refills: 0 | Status: DISCONTINUED | OUTPATIENT
Start: 2023-06-29 | End: 2023-07-13

## 2023-06-29 RX ORDER — SENNA PLUS 8.6 MG/1
2 TABLET ORAL AT BEDTIME
Refills: 0 | Status: DISCONTINUED | OUTPATIENT
Start: 2023-06-29 | End: 2023-07-13

## 2023-06-29 RX ORDER — ATORVASTATIN CALCIUM 80 MG/1
10 TABLET, FILM COATED ORAL AT BEDTIME
Refills: 0 | Status: DISCONTINUED | OUTPATIENT
Start: 2023-06-29 | End: 2023-07-13

## 2023-06-29 RX ORDER — SODIUM CHLORIDE 9 MG/ML
1000 INJECTION, SOLUTION INTRAVENOUS
Refills: 0 | Status: DISCONTINUED | OUTPATIENT
Start: 2023-06-29 | End: 2023-07-05

## 2023-06-29 RX ORDER — DEXTROSE 50 % IN WATER 50 %
15 SYRINGE (ML) INTRAVENOUS ONCE
Refills: 0 | Status: DISCONTINUED | OUTPATIENT
Start: 2023-06-29 | End: 2023-07-13

## 2023-06-29 RX ORDER — GLUCAGON INJECTION, SOLUTION 0.5 MG/.1ML
1 INJECTION, SOLUTION SUBCUTANEOUS ONCE
Refills: 0 | Status: DISCONTINUED | OUTPATIENT
Start: 2023-06-29 | End: 2023-07-13

## 2023-06-29 RX ORDER — DIVALPROEX SODIUM 500 MG/1
1500 TABLET, DELAYED RELEASE ORAL AT BEDTIME
Refills: 0 | Status: DISCONTINUED | OUTPATIENT
Start: 2023-06-29 | End: 2023-07-13

## 2023-06-29 RX ORDER — LEVOTHYROXINE SODIUM 125 MCG
50 TABLET ORAL DAILY
Refills: 0 | Status: DISCONTINUED | OUTPATIENT
Start: 2023-06-29 | End: 2023-07-13

## 2023-06-29 RX ORDER — LANOLIN ALCOHOL/MO/W.PET/CERES
3 CREAM (GRAM) TOPICAL AT BEDTIME
Refills: 0 | Status: DISCONTINUED | OUTPATIENT
Start: 2023-06-29 | End: 2023-07-13

## 2023-06-29 RX ADMIN — Medication 325 MILLIGRAM(S): at 21:14

## 2023-06-29 RX ADMIN — RISPERIDONE 4 MILLIGRAM(S): 4 TABLET ORAL at 19:30

## 2023-06-29 RX ADMIN — ATORVASTATIN CALCIUM 10 MILLIGRAM(S): 80 TABLET, FILM COATED ORAL at 21:15

## 2023-06-29 RX ADMIN — DIVALPROEX SODIUM 1500 MILLIGRAM(S): 500 TABLET, DELAYED RELEASE ORAL at 21:13

## 2023-06-29 RX ADMIN — PIPERACILLIN AND TAZOBACTAM 200 GRAM(S): 4; .5 INJECTION, POWDER, LYOPHILIZED, FOR SOLUTION INTRAVENOUS at 06:51

## 2023-06-29 RX ADMIN — Medication 250 MILLIGRAM(S): at 07:32

## 2023-06-29 RX ADMIN — Medication 100 MILLIGRAM(S): at 08:12

## 2023-06-29 RX ADMIN — Medication 1 MILLIGRAM(S): at 13:32

## 2023-06-29 RX ADMIN — Medication 50 MILLIGRAM(S): at 19:31

## 2023-06-29 RX ADMIN — Medication 3 MILLILITER(S): at 23:12

## 2023-06-29 RX ADMIN — Medication 3 MILLIGRAM(S): at 21:14

## 2023-06-29 RX ADMIN — GABAPENTIN 400 MILLIGRAM(S): 400 CAPSULE ORAL at 18:57

## 2023-06-29 RX ADMIN — Medication 100 MILLIGRAM(S): at 21:13

## 2023-06-29 RX ADMIN — Medication 300 MILLIGRAM(S): at 19:30

## 2023-06-29 RX ADMIN — TAMSULOSIN HYDROCHLORIDE 0.4 MILLIGRAM(S): 0.4 CAPSULE ORAL at 21:13

## 2023-06-29 RX ADMIN — SENNA PLUS 2 TABLET(S): 8.6 TABLET ORAL at 21:14

## 2023-06-29 RX ADMIN — PIPERACILLIN AND TAZOBACTAM 25 GRAM(S): 4; .5 INJECTION, POWDER, LYOPHILIZED, FOR SOLUTION INTRAVENOUS at 13:31

## 2023-06-29 RX ADMIN — MIRTAZAPINE 30 MILLIGRAM(S): 45 TABLET, ORALLY DISINTEGRATING ORAL at 21:13

## 2023-06-29 RX ADMIN — ENOXAPARIN SODIUM 40 MILLIGRAM(S): 100 INJECTION SUBCUTANEOUS at 18:56

## 2023-06-29 RX ADMIN — LORATADINE 10 MILLIGRAM(S): 10 TABLET ORAL at 13:32

## 2023-06-29 NOTE — H&P ADULT - ASSESSMENT
33-year-old male past medical history of intellectual disability, obesity, asthma, autism, impulse control disorder and hyperlipidemia with recent admission for PNA treated with ceftriaxone now returning for tachypnea and tachycardia and admitted for unresolved PNA

## 2023-06-29 NOTE — PATIENT PROFILE ADULT - FALL HARM RISK - HARM RISK INTERVENTIONS

## 2023-06-29 NOTE — ED ADULT NURSE REASSESSMENT NOTE - NS ED NURSE REASSESS COMMENT FT1
Break coverage RN: Pt resting on stretcher at this time. Respirations even and unlabored. Pt offers no complaints at this time. pending CT. bed in lowest position, side rails up, safety maintained, staff from facility at bedside.

## 2023-06-29 NOTE — SOCIAL WORK POST DISCHARGE FOLLOW UP NOTE - NSBHSWFOLLOWUP_PSY_ALL_CORE_FT
During admission process with CAROL pt c/o chest pain, on exam pt pulse ox 93, with unstable vitals, presented tachy, Pt sent back to ED and was admitted to medicine. Case closed.

## 2023-06-29 NOTE — BH INPATIENT PSYCHIATRY DISCHARGE NOTE - NSBHMETABOLIC_PSY_ALL_CORE_FT
BMI: BMI (kg/m2): 41.3 (06-28-23 @ 21:19)  HbA1c: A1C with Estimated Average Glucose Result: 6.7 % (06-24-23 @ 06:10)    Glucose: POCT Blood Glucose.: 198 mg/dL (06-28-23 @ 20:10)    BP: --  Lipid Panel: Date/Time: 06-24-23 @ 06:10  Cholesterol, Serum: 118  Direct LDL: --  HDL Cholesterol, Serum: 25  Total Cholesterol/HDL Ration Measurement: --  Triglycerides, Serum: 155

## 2023-06-29 NOTE — H&P ADULT - PROBLEM SELECTOR PLAN 3
-redirect as needed -psych consult  -continue medications:  Klonopin 1 daily  Depakote 1500qhs  austin 400BID  melatonin 5 qhs  Remeron 30 qhs  risperidone 4BID

## 2023-06-29 NOTE — BH INPATIENT PSYCHIATRY DISCHARGE NOTE - NSDCCPCAREPLAN_GEN_ALL_CORE_FT
PRINCIPAL DISCHARGE DIAGNOSIS  Diagnosis: MDD (major depressive disorder)  Assessment and Plan of Treatment:       SECONDARY DISCHARGE DIAGNOSES  Diagnosis: Pneumonia  Assessment and Plan of Treatment:

## 2023-06-29 NOTE — BH INPATIENT PSYCHIATRY DISCHARGE NOTE - REASON FOR ADMISSION
Pt is a 34 yo M, single, disabled, non-caregiver, resides at a [facility] to [facility], with psych h/o moderate ID, ASD, impulse control disorder, factitious disorder, HAVEN, mood disorders, PTSD and ADHD, multiple past psych admissions (last known [facility] 6/13/23-present), numerous ED visits for both medical/psych complaints, longstanding h/o SIB (head banging, cutting), but no known SA, longstanding h/o endorsing SI, h/o aggression toward staff at , PMH significant for asthma, DM, urinary retention, GERD, BPH, hypothyroidism, HLD, HTN, and b/l myopia,  who was admitted to [facility]  for self inflicted laceration to abdomen, endorsing CAH to hurt himself and staff at group home. Pt transferred to [facility] and admitted to medicine on 6/23/23 for lethargy, pneumonia. Psychiatry consulted and now being re-admitted to [facility] for further management of SI, mood sx.

## 2023-06-29 NOTE — BH INPATIENT PSYCHIATRY DISCHARGE NOTE - ADULT OR CHILD PROTECTIVE SERVICES INVOLVEMENT
----- Message from My Connor sent at 4/9/2020 12:21 PM CDT -----  Contact: Rm-friend   Would like to consult with Sayra regarding pt, will not give any additional information. Please give a call back at [phone].          Thanks,  My AYALA   Unable to assess

## 2023-06-29 NOTE — BH INPATIENT PSYCHIATRY DISCHARGE NOTE - OTHER PAST PSYCHIATRIC HISTORY (INCLUDE DETAILS REGARDING ONSET, COURSE OF ILLNESS, INPATIENT/OUTPATIENT TREATMENT)
Pt was readmitted to [facility] from [facility] medicine last night, however, during admission process with CAROL pt c/o chest pain, on exam pt pulse ox 93, with unstable vitals, presented tachy, Pt sent back to ED, and pt was readmitted to medicine. The following is from previous psychosocial from 2 weeks ago as writer was unable to meet with pt prior to transfer:    Pt is a 32YO single white man, domic[facility] [place] in the context of cutting his stomach with the metal screws from his eyeglasses, superficial lacerations were evident and no sutures provided while in medicine. PPH of moderate ID, ASD, impulse control disorder, factitious disorder, HAVEN, mood disorder, PTSD, and ADHD. Pt has had multiple hospitalizations, most recently discharged last week from [facility] and was a rapid readmit. Pt has a hx of self-injurious behaviors, such as head banging and cutting. Pt has hx of making suicidal statements but no hx of suicide attempts, hx of aggression and violence toward staff. Pt endorses CAH in the context of voices telling him to kill himself.   Pt has a medical hx of asthma, DM, urinary retention, GERD, Hypothyroidism, HLD, HTN, b/l myopia.   Pt does not have any legal hx.    Medicaid: [id number]

## 2023-06-29 NOTE — H&P ADULT - HISTORY OF PRESENT ILLNESS
33-year-old male past medical history of intellectual disability obesity asthma autism impulse control disorder and hyperlipidemia presenting as an inpatient from [facility].  The patient had been admitted there for self injures behavior where he had developed pneumonia.  He was transferred to [facility] for a few days where he received antibiotic therapy and was managed by the inpatient psychiatry team.  He was discharged back to [facility] on 6/28 where he developed sudden onset of sharp left-sided chest pain and SOB.  Has never had similar pains in the past.  He was found to be tachypneic and tachycardic and patient was re-admitted for further management.     Vital Signs Last 24 Hrs  T(C): 36.3 (29 Jun 2023 08:55), Max: 37.2 (28 Jun 2023 21:19)  T(F): 97.4 (29 Jun 2023 08:55), Max: 99 (28 Jun 2023 21:19)  HR: 97 (29 Jun 2023 08:55) (91 - 122)  BP: 139/68 (29 Jun 2023 08:55) (121/98 - 148/90)  BP(mean): 108 (29 Jun 2023 05:52) (106 - 108)  RR: 20 (29 Jun 2023 08:55) (16 - 20)  SpO2: 95% (29 Jun 2023 08:55) (95% - 100%)    Parameters below as of 29 Jun 2023 08:55  Patient On (Oxygen Delivery Method): room air     33-year-old male past medical history of intellectual disability obesity asthma autism impulse control disorder and hyperlipidemia presenting as an inpatient from [facility].  The patient had been admitted there for self injures behavior where he had developed pneumonia.  He was transferred to [facility] for a few days where he received antibiotic therapy and was managed by the inpatient psychiatry team (6/23-6/28).  He was discharged back to [facility] on 6/28 where he developed sudden onset of sharp left-sided chest pain and SOB.  Has never had similar pains in the past.  He was found to be tachypneic and tachycardic and patient was re-admitted for further management. Patient continues to endorse left sided chest pain, SOB and palpitations. He also reports occasional cough after meals. Still has cough but denies any fevers or chills. During evaluation patient noted to have HR of 94 and pulse 0x on RA 95%.     Collateral obtained from Mother [facility]- she also reports occasional episodes of choking but not a constant issue or concern.     Vital Signs Last 24 Hrs  T(C): 36.3 (29 Jun 2023 08:55), Max: 37.2 (28 Jun 2023 21:19)  T(F): 97.4 (29 Jun 2023 08:55), Max: 99 (28 Jun 2023 21:19)  HR: 97 (29 Jun 2023 08:55) (91 - 122)  BP: 139/68 (29 Jun 2023 08:55) (121/98 - 148/90)  BP(mean): 108 (29 Jun 2023 05:52) (106 - 108)  RR: 20 (29 Jun 2023 08:55) (16 - 20)  SpO2: 95% (29 Jun 2023 08:55) (95% - 100%)    Parameters below as of 29 Jun 2023 08:55  Patient On (Oxygen Delivery Method): room air

## 2023-06-29 NOTE — BH SOCIAL WORK INITIAL PSYCHOSOCIAL EVALUATION - OTHER PAST PSYCHIATRIC HISTORY (INCLUDE DETAILS REGARDING ONSET, COURSE OF ILLNESS, INPATIENT/OUTPATIENT TREATMENT)
Pt was readmitted to [facility] from [facility] medicine last night, however, during admission process with CAROL pt c/o chest pain, on exam pt pulse ox 93, with unstable vitals, presented tachy, Pt sent back to ED, and pt was readmitted to medicine. The following is from previous psychosocial from 2 weeks ago as writer was unable to meet with pt prior to transfer:    Pt is a 32YO single white man, domic[facility] in [facility] [place] in the context of cutting his stomach with the metal screws from his eyeglasses, superficial lacerations were evident and no sutures provided while in medicine. PPH of moderate ID, ASD, impulse control disorder, factitious disorder, HAVEN, mood disorder, PTSD, and ADHD. Pt has had multiple hospitalizations, most recently discharged last week from [facility] and was a rapid readmit. Pt has a hx of self-injurious behaviors, such as head banging and cutting. Pt has hx of making suicidal statements but no hx of suicide attempts, hx of aggression and violence toward staff. Pt endorses CAH in the context of voices telling him to kill himself.   Pt has a medical hx of asthma, DM, urinary retention, GERD, Hypothyroidism, HLD, HTN, b/l myopia.   Pt does not have any legal hx.    Medicaid: [id number]

## 2023-06-29 NOTE — BH INPATIENT PSYCHIATRY DISCHARGE NOTE - NSDCMRMEDTOKEN_GEN_ALL_CORE_FT
atorvastatin 10 mg oral tablet: 1 tab(s) orally once a day (at bedtime)  bacitracin 500 units/g topical ointment: 1 Apply topically to affected area 2 times a day  clonazePAM 1 mg oral tablet: 1 tab(s) orally once a day  divalproex sodium 500 mg oral delayed release tablet: 3 tab(s) orally once a day (at bedtime)  ferrous sulfate 325 mg (65 mg elemental iron) oral tablet: 1 tab(s) orally once a day (at bedtime)  gabapentin 400 mg oral capsule: 1 cap(s) orally 2 times a day  guaiFENesin 100 mg/5 mL oral liquid: 10 milliliter(s) orally every 6 hours As needed Cough  guanFACINE 2 mg oral tablet: 1 tab(s) orally once a day  levothyroxine 50 mcg (0.05 mg) oral tablet: 1 tab(s) orally once a day  loratadine 10 mg oral tablet: 1 tab(s) orally once a day  Melatonin 5 mg oral tablet: 1 tab(s) orally once a day (at bedtime)  metFORMIN 1000 mg oral tablet: 1 tab(s) orally 2 times a day (with meals)  mirtazapine 30 mg oral tablet: 1 tab(s) orally once a day (at bedtime)  pantoprazole 40 mg oral delayed release tablet: 1 tab(s) orally once a day  risperiDONE 4 mg oral tablet: 1 tab(s) orally 2 times a day  senna leaf extract oral tablet: 2 tab(s) orally once a day (at bedtime)  tamsulosin 0.4 mg oral capsule: 1 cap(s) orally once a day (at bedtime)   atorvastatin 10 mg oral tablet: 1 tab(s) orally once a day (at bedtime)  bacitracin 500 units/g topical ointment: 1 Apply topically to affected area 2 times a day  clonazePAM 1 mg oral tablet: 1 tab(s) orally once a day  divalproex sodium 500 mg oral delayed release tablet: 3 tab(s) orally once a day (at bedtime)  ferrous sulfate 325 mg (65 mg elemental iron) oral tablet: 1 tab(s) orally once a day (at bedtime)  gabapentin 400 mg oral capsule: 1 cap(s) orally 2 times a day  guaiFENesin 100 mg/5 mL oral liquid: 10 milliliter(s) orally every 6 hours As needed Cough  levothyroxine 50 mcg (0.05 mg) oral tablet: 1 tab(s) orally once a day  loratadine 10 mg oral tablet: 1 tab(s) orally once a day  Melatonin 5 mg oral tablet: 1 tab(s) orally once a day (at bedtime)  metFORMIN 1000 mg oral tablet: 1 tab(s) orally 2 times a day (with meals)  mirtazapine 30 mg oral tablet: 1 tab(s) orally once a day (at bedtime)  pantoprazole 40 mg oral delayed release tablet: 1 tab(s) orally once a day  risperiDONE 4 mg oral tablet: 1 tab(s) orally 2 times a day  senna leaf extract oral tablet: 2 tab(s) orally once a day (at bedtime)  tamsulosin 0.4 mg oral capsule: 1 cap(s) orally once a day (at bedtime)   acetaminophen 325 mg oral tablet: 2 tab(s) orally every 6 hours As needed Temp greater or equal to 38C (100.4F), Mild Pain (1 - 3)  atorvastatin 10 mg oral tablet: 1 tab(s) orally once a day (at bedtime)  bacitracin 500 units/g topical ointment: 1 Apply topically to affected area 2 times a day  clonazePAM 1 mg oral tablet: 1 tab(s) orally once a day  divalproex sodium 500 mg oral delayed release tablet: 3 tab(s) orally once a day (at bedtime)  ferrous sulfate 325 mg (65 mg elemental iron) oral tablet: 1 tab(s) orally once a day (at bedtime)  gabapentin 400 mg oral capsule: 1 cap(s) orally 2 times a day  guaiFENesin 100 mg/5 mL oral liquid: 10 milliliter(s) orally every 6 hours As needed Cough  guanFACINE 1 mg oral tablet: 1 tab(s) orally once a day (at bedtime)  levothyroxine 50 mcg (0.05 mg) oral tablet: 1 tab(s) orally once a day  loratadine 10 mg oral tablet: 1 tab(s) orally once a day  melatonin 3 mg oral tablet: 1 tab(s) orally once a day (at bedtime)  metFORMIN 1000 mg oral tablet: 1 tab(s) orally 2 times a day (with meals)  mirtazapine 30 mg oral tablet: 1 tab(s) orally once a day (at bedtime)  pantoprazole 40 mg oral delayed release tablet: 1 tab(s) orally once a day  risperiDONE 4 mg oral tablet: 1 tab(s) orally 2 times a day  senna leaf extract oral tablet: 2 tab(s) orally once a day (at bedtime)  tamsulosin 0.4 mg oral capsule: 1 cap(s) orally once a day (at bedtime)   acetaminophen 325 mg oral tablet: 2 tab(s) orally every 6 hours As needed Temp greater or equal to 38C (100.4F), Mild Pain (1 - 3)  atorvastatin 10 mg oral tablet: 1 tab(s) orally once a day (at bedtime)  bacitracin 500 units/g topical ointment: 1 Apply topically to affected area 2 times a day  clonazePAM 1 mg oral tablet: 1 tab(s) orally once a day  divalproex sodium 500 mg oral delayed release tablet: 3 tab(s) orally once a day (at bedtime)  ferrous sulfate 325 mg (65 mg elemental iron) oral tablet: 1 tab(s) orally once a day (at bedtime)  gabapentin 400 mg oral capsule: 1 cap(s) orally 2 times a day  guaiFENesin 100 mg/5 mL oral liquid: 10 milliliter(s) orally every 6 hours As needed Cough  guanFACINE 1 mg oral tablet: 1 tab(s) orally once a day (at bedtime)  insulin glargine 100 units/mL subcutaneous solution: 15 unit(s) subcutaneous once a day (at bedtime)  insulin lispro 100 units/mL injectable solution: 5 unit(s) injectable 3 times a day (with meals)  levothyroxine 50 mcg (0.05 mg) oral tablet: 1 tab(s) orally once a day  loratadine 10 mg oral tablet: 1 tab(s) orally once a day  melatonin 3 mg oral tablet: 1 tab(s) orally once a day (at bedtime)  metFORMIN 1000 mg oral tablet: 1 tab(s) orally 2 times a day (with meals)  mirtazapine 30 mg oral tablet: 1 tab(s) orally once a day (at bedtime)  nystatin 100,000 units/g topical powder: Apply topically to affected area 2 times a day to b/l  groin &amp; scrotum  pantoprazole 40 mg oral delayed release tablet: 1 tab(s) orally once a day  risperiDONE 3 mg oral tablet: 1 tab(s) orally once a day  risperiDONE 4 mg oral tablet: 1 tab(s) orally once a day (at bedtime)  senna leaf extract oral tablet: 2 tab(s) orally once a day (at bedtime)  tamsulosin 0.4 mg oral capsule: 1 cap(s) orally once a day (at bedtime)

## 2023-06-29 NOTE — ED ADULT NURSE REASSESSMENT NOTE - NS ED NURSE REASSESS COMMENT FT1
Received patient in bed aox4 in no acute distress.  History of mild autism, coming from [facility].  Came in for SOB and coughing. Admitted for PNA. Vanco is infusing. Sating @97% via NC @2LPM Will continue to monitor for any changes. Received patient in bed aox4 in no acute distress.  History of mild autism, coming from [facility].  Came in for SOB left side chest pain and coughing. Admitted for PNA. Vanco is infusing. Sating @97% via NC @2LPM Will continue to monitor for any changes. 1:1 in progress. Patient is safe and comfortable. Will continue to monitor for any changes.

## 2023-06-29 NOTE — BH INPATIENT PSYCHIATRY DISCHARGE NOTE - OTHER
Patient was admitted to medicine at [facility] Patient was admitted to medicine at [facility] Patient was admitted to medicine at [facility] Patient was admitted to medicine at [facility] Patient was admitted to medicine at [facility] Patient was admitted to medicine at [facility] Patient was admitted to medicine at [facility] Patient was admitted to medicine at [facility] Patient was admitted to medicine at [facility] Patient was admitted to medicine at [facility] Patient was admitted [facility] medicine at [facility] Patient was admitted to medicine at [facility] Patient was admitted to medicine at [facility] Patient was admitted to medicine at [facility] Patient was admitted to medicine at [facility] Patient was admitted to medicine at [facility] Patient was admitted to medicine at [facility] Patient was admitted to medicine at [facility]

## 2023-06-29 NOTE — BH INPATIENT PSYCHIATRY DISCHARGE NOTE - HPI (INCLUDE ILLNESS QUALITY, SEVERITY, DURATION, TIMING, CONTEXT, MODIFYING FACTORS, ASSOCIATED SIGNS AND SYMPTOMS)
Pt is a 32 yo M, single, disabled, non-caregiver, [facility], with psych h/o moderate ID, ASD, impulse control disorder, factitious disorder, HAVEN, mood disorders, PTSD and ADHD, multiple past psych admissions (last known [facility] 6/13/23-present), numerous ED visits for both medical/psych complaints, longstanding h/o SIB (head banging, cutting), but no known SA, longstanding h/o endorsing SI, h/o aggression toward staff at , PMH significant for asthma, DM, urinary retention, GERD, BPH, hypothyroidism, HLD, HTN, and b/l myopia,  who was admitted to [facility] ML4  for self inflicted laceration to abdomen, endorsing CAH to hurt himself and staff at group home. Pt transferred to [facility] and admitted to medicine on 6/23/23 for lethargy, pneumonia. Psychiatry consulted and now being re-admitted to [facility] for further management of SI, mood sx.     On [facility] CL service, patient was initially calm and cooperative. During the admission, he had an episode of agitation where he stated he wanted to kill himself, tried pulling out the IVs and scratching himself. On follow up assessments, he denied SI/HI/I/P but was minimally engaged with team.    On ML4, patient reports chest pain and dizziness on initial introduction and was requesting to go back to [facility]. Patient was found to be tachycardic but otherwise vitally stable. Patient stated that his mood was fine. Reported difficulty falling asleep but fair appetite. Denies AVH. Denied SI/HI/I/P and making suicidal statement while in [facility]. Denies substance use states last used alcohol and cigarettes two years ago.     On re-assessment, patient remained tachycardic but improved from initial check.

## 2023-06-29 NOTE — H&P ADULT - PROBLEM SELECTOR PLAN 2
-psych consult  -continue medications:  Klonopin 1 daily  Depakote 1500qhs  austin 400BID  melatonin 5 qhs  Remeron 30 qhs  risperidone 4BID -Cardiac enzyme 7, ekg non ischemic  -CTA negative for PE  -Sinus tach likely driven by agitation, anxiety, underlying psychiatric condition??  -TSH WNL  -no need for tele

## 2023-06-29 NOTE — BH INPATIENT PSYCHIATRY DISCHARGE NOTE - HOSPITAL COURSE
Pt is a 32 yo M, single, disabled, non-caregiver, resides at a [facility], with psych h/o moderate ID, ASD, impulse control disorder, factitious disorder, HAVEN, mood disorders, PTSD and ADHD, multiple past psych admissions (last known [facility] 6/13/23-present), numerous ED visits for both medical/psych complaints, longstanding h/o SIB (head banging, cutting), but no known SA, longstanding h/o endorsing SI, h/o aggression toward staff at , PMH significant for asthma, DM, urinary retention, GERD, BPH, hypothyroidism, HLD, HTN, and b/l myopia,  who was admitted to [facility] ML4  for self inflicted laceration to abdomen, endorsing CAH to hurt himself and staff at group home. Pt transferred to [facility] and admitted to medicine on 6/23/23 for lethargy, pneumonia.    Patient was cleared medically and re-admitted to [facility] for further management of SI, mood sx.     On ML4, patient reported chest pain and dizziness on initial examination,   and was requesting to go back to [facility]. Patient was found to be tachycardic, reported difficulty breathing, Pulse Ox 93%. Patient was sent back to ED for furher work up.  Patient subsequently readmitted to medicine at [facility]

## 2023-06-29 NOTE — PATIENT PROFILE ADULT - FALL HARM RISK - ATTEMPT OOB
Patient presents for a postpartum visit with complaint of recent change in incision- localized irritation right side incision. Denies drainage, swelling pain. No fevers/chills. She is 2 weeks postpartum following a low cervical transverse  section.       The following portions of the patient's history were reviewed and updated as appropriate: allergies, current medications, past family history, past medical history, past social history, past surgical history and problem list.    Review of Systems  Pertinent items are noted in HPI.        Vitals:    21 1411   BP: 108/62       General:  alert, appears stated age and cooperative    Breasts:  deferred   Lungs: clear to auscultation bilaterally   Heart:  regular rate and rhythm, S1, S2 normal, no murmur, click, rub or gallop   Abdomen: soft, non-tender; bowel sounds normal; no masses,  no organomegaly and low transverse incision CDI. no s/s infection. small area of contact dermatitis right side incision.                                  2 week postpartum exam.      Reassurance provided regarding incision.  Comfort measures discussed and wound care discussed in detail  Infection warnings reinforced  Call if symptoms persist or fail to improve    Keep next scheduled pp visit  Stella Becker APRN     No

## 2023-06-29 NOTE — BH INPATIENT PSYCHIATRY DISCHARGE NOTE - NSBHASSESSSUMMFT_PSY_ALL_CORE
Pt is a 32 yo M, single, disabled, non-caregiver, resides at a [facility], with psych h/o moderate ID, ASD, impulse control disorder, factitious disorder, HAVEN, mood disorders, PTSD and ADHD, multiple past psych admissions (last known [facility] 6/13/23-present), numerous ED visits for both medical/psych complaints, longstanding h/o SIB (head banging, cutting), but no known SA, longstanding h/o endorsing SI, h/o aggression toward staff at , PMH significant for asthma, DM, urinary retention, GERD, BPH, hypothyroidism, HLD, HTN, and b/l myopia,  who was admitted to [facility] ML4  for self inflicted laceration to abdomen, endorsing CAH to hurt himself and staff at group home. Pt transferred to [facility] and admitted to medicine on 6/23/23 for lethargy, pneumonia.    Patient was cleared medically and re-admitted to [facility] for further management of SI, mood sx.     On ML4, patient reported chest pain and dizziness on initial examination,   and was requesting to go back to [facility]. Patient was found to be tachycardic, reported difficulty breathing, Pulse Ox 93%. Patient was sent back to ED for furher work up.  Patient subsequently readmitted to medicine at [facility]

## 2023-06-29 NOTE — H&P ADULT - PROBLEM SELECTOR PLAN 1
-recent admission 6/23-6/28 for PNA treated with Ceftriaxone  -urine legionella negative last admission and azithromycin was discontinued  -now with sinus tach and tachypnea at [facility] resulting in readmission  -CTA with multilobar right sided PNA  -broaden antibiotics to zosyn and vancomycin given concern for resistant infection. Check MRSA, if negative, can stop vanco  -repeat urine legionella and speech and swallow eval to assess for aspiration -recent admission 6/23-6/28 for PNA treated with Ceftriaxone  -urine legionella negative last admission and azithromycin was discontinued  -now with sinus tach and tachypnea at [facility] resulting in readmission  -patient continues to endorse chest pain, SOB and cough  -CTA with multilobar right sided PNA  -broaden antibiotics to zosyn and vancomycin given concern for resistant infection. Check MRSA, if negative, can stop vanco  -repeat urine legionella   -reported cough after meals. Concern for aspiration. Speech and swallow evaluation. Dysphagia diet for now

## 2023-06-30 LAB
ALBUMIN SERPL ELPH-MCNC: 3.8 G/DL — SIGNIFICANT CHANGE UP (ref 3.3–5)
ALP SERPL-CCNC: 96 U/L — SIGNIFICANT CHANGE UP (ref 40–120)
ALT FLD-CCNC: 86 U/L — HIGH (ref 4–41)
ANION GAP SERPL CALC-SCNC: 12 MMOL/L — SIGNIFICANT CHANGE UP (ref 7–14)
AST SERPL-CCNC: 51 U/L — HIGH (ref 4–40)
BILIRUB SERPL-MCNC: <0.2 MG/DL — SIGNIFICANT CHANGE UP (ref 0.2–1.2)
BUN SERPL-MCNC: 11 MG/DL — SIGNIFICANT CHANGE UP (ref 7–23)
CALCIUM SERPL-MCNC: 9 MG/DL — SIGNIFICANT CHANGE UP (ref 8.4–10.5)
CHLORIDE SERPL-SCNC: 99 MMOL/L — SIGNIFICANT CHANGE UP (ref 98–107)
CO2 SERPL-SCNC: 25 MMOL/L — SIGNIFICANT CHANGE UP (ref 22–31)
CREAT SERPL-MCNC: 0.79 MG/DL — SIGNIFICANT CHANGE UP (ref 0.5–1.3)
EGFR: 120 ML/MIN/1.73M2 — SIGNIFICANT CHANGE UP
GLUCOSE BLDC GLUCOMTR-MCNC: 125 MG/DL — HIGH (ref 70–99)
GLUCOSE BLDC GLUCOMTR-MCNC: 167 MG/DL — HIGH (ref 70–99)
GLUCOSE BLDC GLUCOMTR-MCNC: 247 MG/DL — HIGH (ref 70–99)
GLUCOSE BLDC GLUCOMTR-MCNC: 303 MG/DL — HIGH (ref 70–99)
GLUCOSE SERPL-MCNC: 182 MG/DL — HIGH (ref 70–99)
HCT VFR BLD CALC: 44.6 % — SIGNIFICANT CHANGE UP (ref 39–50)
HGB BLD-MCNC: 13.9 G/DL — SIGNIFICANT CHANGE UP (ref 13–17)
MCHC RBC-ENTMCNC: 25.8 PG — LOW (ref 27–34)
MCHC RBC-ENTMCNC: 31.2 GM/DL — LOW (ref 32–36)
MCV RBC AUTO: 82.9 FL — SIGNIFICANT CHANGE UP (ref 80–100)
NRBC # BLD: 0 /100 WBCS — SIGNIFICANT CHANGE UP (ref 0–0)
NRBC # FLD: 0 K/UL — SIGNIFICANT CHANGE UP (ref 0–0)
PLATELET # BLD AUTO: 251 K/UL — SIGNIFICANT CHANGE UP (ref 150–400)
POTASSIUM SERPL-MCNC: 4.3 MMOL/L — SIGNIFICANT CHANGE UP (ref 3.5–5.3)
POTASSIUM SERPL-SCNC: 4.3 MMOL/L — SIGNIFICANT CHANGE UP (ref 3.5–5.3)
PROCALCITONIN SERPL-MCNC: 0.09 NG/ML — SIGNIFICANT CHANGE UP (ref 0.02–0.1)
PROT SERPL-MCNC: 6.6 G/DL — SIGNIFICANT CHANGE UP (ref 6–8.3)
RBC # BLD: 5.38 M/UL — SIGNIFICANT CHANGE UP (ref 4.2–5.8)
RBC # FLD: 13.9 % — SIGNIFICANT CHANGE UP (ref 10.3–14.5)
SODIUM SERPL-SCNC: 136 MMOL/L — SIGNIFICANT CHANGE UP (ref 135–145)
WBC # BLD: 7.26 K/UL — SIGNIFICANT CHANGE UP (ref 3.8–10.5)
WBC # FLD AUTO: 7.26 K/UL — SIGNIFICANT CHANGE UP (ref 3.8–10.5)

## 2023-06-30 PROCEDURE — 99233 SBSQ HOSP IP/OBS HIGH 50: CPT

## 2023-06-30 PROCEDURE — 93010 ELECTROCARDIOGRAM REPORT: CPT

## 2023-06-30 PROCEDURE — 99232 SBSQ HOSP IP/OBS MODERATE 35: CPT

## 2023-06-30 RX ORDER — INSULIN LISPRO 100/ML
VIAL (ML) SUBCUTANEOUS
Refills: 0 | Status: DISCONTINUED | OUTPATIENT
Start: 2023-06-30 | End: 2023-07-13

## 2023-06-30 RX ORDER — CHLORPROMAZINE HCL 10 MG
50 TABLET ORAL EVERY 6 HOURS
Refills: 0 | Status: DISCONTINUED | OUTPATIENT
Start: 2023-06-30 | End: 2023-07-13

## 2023-06-30 RX ORDER — INSULIN LISPRO 100/ML
VIAL (ML) SUBCUTANEOUS AT BEDTIME
Refills: 0 | Status: DISCONTINUED | OUTPATIENT
Start: 2023-06-30 | End: 2023-07-13

## 2023-06-30 RX ORDER — CHLORPROMAZINE HCL 10 MG
50 TABLET ORAL EVERY 6 HOURS
Refills: 0 | Status: DISCONTINUED | OUTPATIENT
Start: 2023-06-30 | End: 2023-06-30

## 2023-06-30 RX ORDER — GUANFACINE 3 MG/1
1 TABLET, EXTENDED RELEASE ORAL AT BEDTIME
Refills: 0 | Status: DISCONTINUED | OUTPATIENT
Start: 2023-06-30 | End: 2023-07-13

## 2023-06-30 RX ADMIN — RISPERIDONE 4 MILLIGRAM(S): 4 TABLET ORAL at 22:17

## 2023-06-30 RX ADMIN — LORATADINE 10 MILLIGRAM(S): 10 TABLET ORAL at 12:53

## 2023-06-30 RX ADMIN — GABAPENTIN 400 MILLIGRAM(S): 400 CAPSULE ORAL at 18:06

## 2023-06-30 RX ADMIN — DIVALPROEX SODIUM 1500 MILLIGRAM(S): 500 TABLET, DELAYED RELEASE ORAL at 22:20

## 2023-06-30 RX ADMIN — Medication 100 MILLIGRAM(S): at 07:20

## 2023-06-30 RX ADMIN — Medication 325 MILLIGRAM(S): at 22:19

## 2023-06-30 RX ADMIN — PANTOPRAZOLE SODIUM 40 MILLIGRAM(S): 20 TABLET, DELAYED RELEASE ORAL at 07:20

## 2023-06-30 RX ADMIN — Medication 100 MILLIGRAM(S): at 15:16

## 2023-06-30 RX ADMIN — Medication 3 MILLIGRAM(S): at 22:19

## 2023-06-30 RX ADMIN — PIPERACILLIN AND TAZOBACTAM 25 GRAM(S): 4; .5 INJECTION, POWDER, LYOPHILIZED, FOR SOLUTION INTRAVENOUS at 15:16

## 2023-06-30 RX ADMIN — ENOXAPARIN SODIUM 40 MILLIGRAM(S): 100 INJECTION SUBCUTANEOUS at 18:06

## 2023-06-30 RX ADMIN — ATORVASTATIN CALCIUM 10 MILLIGRAM(S): 80 TABLET, FILM COATED ORAL at 22:19

## 2023-06-30 RX ADMIN — Medication 300 MILLIGRAM(S): at 22:16

## 2023-06-30 RX ADMIN — GABAPENTIN 400 MILLIGRAM(S): 400 CAPSULE ORAL at 07:20

## 2023-06-30 RX ADMIN — Medication 50 MICROGRAM(S): at 07:20

## 2023-06-30 RX ADMIN — Medication 1 MILLIGRAM(S): at 12:52

## 2023-06-30 RX ADMIN — Medication 50 MILLIGRAM(S): at 12:53

## 2023-06-30 RX ADMIN — TAMSULOSIN HYDROCHLORIDE 0.4 MILLIGRAM(S): 0.4 CAPSULE ORAL at 22:19

## 2023-06-30 RX ADMIN — Medication 0: at 22:01

## 2023-06-30 RX ADMIN — RISPERIDONE 4 MILLIGRAM(S): 4 TABLET ORAL at 07:19

## 2023-06-30 RX ADMIN — GUANFACINE 1 MILLIGRAM(S): 3 TABLET, EXTENDED RELEASE ORAL at 23:21

## 2023-06-30 RX ADMIN — MIRTAZAPINE 30 MILLIGRAM(S): 45 TABLET, ORALLY DISINTEGRATING ORAL at 22:19

## 2023-06-30 RX ADMIN — Medication 100 MILLIGRAM(S): at 22:19

## 2023-06-30 RX ADMIN — ENOXAPARIN SODIUM 40 MILLIGRAM(S): 100 INJECTION SUBCUTANEOUS at 07:23

## 2023-06-30 NOTE — DIETITIAN NUTRITION RISK NOTIFICATION - ADDITIONAL COMMENTS/DIETITIAN RECOMMENDATIONS
Please see Dietitian Initial Assessment for complete recommendations.  Salena Lozada, TISH, CDN #[id number]  Also available on [employer] Teams

## 2023-06-30 NOTE — DIETITIAN INITIAL EVALUATION ADULT - ADD RECOMMEND
Recommend continue consistent carbohydrate diet to assist with glycemic management.   Diet texture/consistency per SLP recommendations/medical discretion.  Obtained food preferences, will honor as able.

## 2023-06-30 NOTE — DIETITIAN INITIAL EVALUATION ADULT - OTHER CALCULATIONS
Dosing weight (6/29) 288.8 lbs / 131 kg.  Weight history, per HIE: (5/8) 295 lbs, (2/8) 298 lbs.  Ideal Body Weight: 172 lbs / 78.2 kg +/-10%

## 2023-06-30 NOTE — DIETITIAN INITIAL EVALUATION ADULT - OTHER INFO
Per chart, pt is 33 year old male PMH intellectual disability, obesity, asthma, autism, impulse control disorder, HLD, recent PNA returning for unresolved PNA.    Pt was admitted to [facility] for self injurious behavior (stabbing with screw from eyeglasses), transferred to [facility] (6/23-6/28/23) for PNA, then returned to [facility]. Pt confirms NKFA. Pt denies chewing/swallowing difficulties however per chart pt's mother reported coughing after meals/occasional choking. Concern for aspiration, currently on texture modified diet per MD pending SLP evaluation. Of note, pt was ordered for a regular texture diet during [facility] stay. History of type 2 DM, recent HbA1c (6/24) 6.7%.     Pt reports good appetite, dislikes current diet texture. Food preferences vocalized: no fish, no roasted chicken, no veggie burger; wants "chicken strips", ginger ale and rice. No current GI distress noted. No BM thus far in house. Ordered for senna 2 tablets qHS.

## 2023-06-30 NOTE — BH CONSULTATION LIAISON ASSESSMENT NOTE - CURRENT MEDICATION
MEDICATIONS  (STANDING):  atorvastatin 10 milliGRAM(s) Oral at bedtime  benzonatate 100 milliGRAM(s) Oral every 8 hours  clonazePAM  Tablet 1 milliGRAM(s) Oral daily  dextrose 5%. 1000 milliLiter(s) (100 mL/Hr) IV Continuous <Continuous>  dextrose 5%. 1000 milliLiter(s) (50 mL/Hr) IV Continuous <Continuous>  dextrose 50% Injectable 25 Gram(s) IV Push once  dextrose 50% Injectable 12.5 Gram(s) IV Push once  dextrose 50% Injectable 25 Gram(s) IV Push once  divalproex DR 1500 milliGRAM(s) Oral at bedtime  enoxaparin Injectable 40 milliGRAM(s) SubCutaneous every 12 hours  ferrous    sulfate 325 milliGRAM(s) Oral at bedtime  gabapentin 400 milliGRAM(s) Oral two times a day  glucagon  Injectable 1 milliGRAM(s) IntraMuscular once  levothyroxine 50 MICROGram(s) Oral daily  loratadine 10 milliGRAM(s) Oral daily  melatonin 3 milliGRAM(s) Oral at bedtime  mirtazapine 30 milliGRAM(s) Oral at bedtime  pantoprazole    Tablet 40 milliGRAM(s) Oral before breakfast  piperacillin/tazobactam IVPB.. 3.375 Gram(s) IV Intermittent every 8 hours  risperiDONE   Tablet 4 milliGRAM(s) Oral two times a day  senna 2 Tablet(s) Oral at bedtime  tamsulosin 0.4 milliGRAM(s) Oral at bedtime  vancomycin  IVPB 1500 milliGRAM(s) IV Intermittent every 12 hours    MEDICATIONS  (PRN):  acetaminophen     Tablet .. 650 milliGRAM(s) Oral every 6 hours PRN Temp greater or equal to 38C (100.4F), Mild Pain (1 - 3)  chlorproMAZINE    Tablet 50 milliGRAM(s) Oral every 6 hours PRN Agitation  dextrose Oral Gel 15 Gram(s) Oral once PRN Blood Glucose LESS THAN 70 milliGRAM(s)/deciliter  guaiFENesin Oral Liquid (Sugar-Free) 100 milliGRAM(s) Oral every 6 hours PRN Cough  LORazepam     Tablet 2 milliGRAM(s) Oral every 6 hours PRN Agitation/Anxiety  ondansetron Injectable 4 milliGRAM(s) IV Push every 8 hours PRN Nausea and/or Vomiting

## 2023-06-30 NOTE — DIETITIAN INITIAL EVALUATION ADULT - PERTINENT LABORATORY DATA
(6/30) Na 136, BUN 11, Cr 0.79, <H>, K+ 4.3, Alk Phos 96, ALT/SGPT 86<H>, AST/SGOT 51<H>  POCT: (6/30) 167, (6/29) 222

## 2023-06-30 NOTE — BH CONSULTATION LIAISON ASSESSMENT NOTE - SUMMARY
Pt is a 34 yo M, single, disabled, non-caregiver, resides at a [facility], with psych h/o moderate ID, ASD, impulse control disorder, factitious disorder, HAVEN, mood disorders, PTSD and ADHD, multiple past psych admissions (last known [facility] 6/13/23-present), numerous ED visits for both medical/psych complaints, longstanding h/o SIB (head banging, cutting), but no known SA, longstanding h/o endorsing SI, h/o aggression toward staff at , PMH significant for asthma, DM, urinary retention, GERD, BPH, hypothyroidism, HLD, HTN, and b/l myopia,  who was admitted to [place] [id number]  for self inflicted laceration to abdomen, endorsing CAH to hurt himself and staff at group home. Pt transferred to [facility] and admitted to medicine on 6/23/23 for lethargy, pneumonia. Patient transferred back to [facility] on 6/28 however immediately came back to [facility] for worsening cough, hypoxia, tachycardia and chest pain.  Now being treated for unresolved PNA.    Patient likely close to baseline. Please repeat EKG as QTC > 500, but was tachycardic.  If QTC > 500 will need to hold risperidone and not use thorazine as PRN. PLEASE CALL PSYCH CL IF THAT IS THE CASE    Recommendations-  --> Continue 1:1 at bedside due to recent SI  -->Continue current standing medications as ordered on [place] [id number]:   	Depakote 1500mg qhs,   	Intuniv ER 4mg qhs (may need to be ordered as non-formulary),   	risperidone 4 mg BID,   	Remeron 30 mg qhs, klonopin 1 mg daily, Gabapentin 400 mg TID.  --> PRNs: Thorazine 50 mg PO/IM q6h, Ativan 2 mg PO/IM/IV q6h for severe agitation (monitor qtc < 500). Pt with documented allergy on [place] to Haldol and Zyprexa   --> psych will continue to follow

## 2023-06-30 NOTE — PROGRESS NOTE ADULT - PROBLEM SELECTOR PLAN 1
-recent admission 6/23-6/28 for PNA treated with Ceftriaxone  -urine legionella negative last admission and azithromycin was discontinued  -now with sinus tach and tachypnea at [facility] resulting in readmission  -patient continues to endorse chest pain, SOB and cough  -CTA with multilobar right sided PNA  -broaden antibiotics to zosyn and vancomycin given concern for resistant infection. Check MRSA, if negative, can stop vanco  -repeat urine legionella   -reported cough after meals. Concern for aspiration. Speech and swallow evaluation. Dysphagia diet for now -recent admission 6/23-6/28 for PNA treated with Ceftriaxone  -urine legionella negative last admission and azithromycin was discontinued  -now with sinus tach and tachypnea at [facility] resulting in readmission  -patient continues to endorse chest pain, SOB and cough  -CTA with multilobar right sided PNA  -broaden antibiotics to zosyn and vancomycin given concern for resistant infection. Check MRSA, if negative, can stop vanco  -repeat urine legionella   -reported cough after meals. Concern for aspiration. Speech and swallow evaluation. Dysphagia diet for now, pt refused S&S eval

## 2023-06-30 NOTE — DIETITIAN INITIAL EVALUATION ADULT - PERTINENT MEDS FT
atorvastatin   ferrous sulfate   levothyroxine  mirtazapine   pantoprazole Tablet  piperacillin/tazobactam IV  senna  vancomycin IV  ondansetron IV PRN

## 2023-06-30 NOTE — PROGRESS NOTE ADULT - PROBLEM SELECTOR PLAN 3
-psych consult  -continue medications:  Klonopin 1 daily  Depakote 1500qhs  austin 400BID  melatonin 5 qhs  Remeron 30 qhs  risperidone 4BID -psych consult appreciated   -continue medications:  Klonopin 1 daily  Depakote 1500qhs  austin 400BID  melatonin 5 qhs  Remeron 30 qhs  risperidone 4BID   per psych rec PRNs: Thorazine 50 mg PO/IM q6h, Ativan 2 mg PO/IM/IV q6h for severe agitation (monitor qtc < 500).   will repeat EKG for QTC

## 2023-06-30 NOTE — BH CONSULTATION LIAISON ASSESSMENT NOTE - MSE UNSTRUCTURED FT
Mental Status Exam:  Lucien: well groomed, fair hygiene     Behavior: calm, cooperative, no psychomotor retardation/agitation  Motor: no tremors, EPS, or rigidity  Gait: did not assess, pt in bed  Speech: speech impediment, otherwise, normal rate, rhythm, prosody and volume   Mood: "okay"  Affect: euthymic, congruent  Thought process: clear, goal directed   Thought Content: denies paranoia, delusions   Perception: denies AH/VH  SI: denies  HI: denies  Insight: limited  Judgment: fair at this moment    Cognitive Exam:  Orientation: AOx3  Recall: intact  Attention: intact

## 2023-06-30 NOTE — BH CONSULTATION LIAISON ASSESSMENT NOTE - NSBHCHARTREVIEWVS_PSY_A_CORE FT
Vital Signs Last 24 Hrs  T(C): 36.7 (30 Jun 2023 09:30), Max: 36.7 (30 Jun 2023 05:51)  T(F): 98 (30 Jun 2023 09:30), Max: 98.1 (30 Jun 2023 05:51)  HR: 102 (30 Jun 2023 09:30) (92 - 102)  BP: 121/72 (30 Jun 2023 09:30) (121/72 - 138/70)  BP(mean): 93 (29 Jun 2023 12:46) (93 - 93)  RR: 19 (30 Jun 2023 09:30) (19 - 20)  SpO2: 95% (30 Jun 2023 09:30) (95% - 98%)    Parameters below as of 30 Jun 2023 09:30  Patient On (Oxygen Delivery Method): room air

## 2023-06-30 NOTE — BH CONSULTATION LIAISON ASSESSMENT NOTE - HPI (INCLUDE ILLNESS QUALITY, SEVERITY, DURATION, TIMING, CONTEXT, MODIFYING FACTORS, ASSOCIATED SIGNS AND SYMPTOMS)
Pt is a 32 yo M, single, disabled, non-caregiver, resides at a [facility], with psych h/o moderate ID, ASD, impulse control disorder, factitious disorder, HAVEN, mood disorders, PTSD and ADHD, multiple past psych admissions (last known [facility] 6/13/23-present), numerous ED visits for both medical/psych complaints, longstanding h/o SIB (head banging, cutting), but no known SA, longstanding h/o endorsing SI, h/o aggression toward staff at , PMH significant for asthma, DM, urinary retention, GERD, BPH, hypothyroidism, HLD, HTN, and b/l myopia,  who was admitted to [facility]  for self inflicted laceration to abdomen, endorsing CAH to hurt himself and staff at group home. Pt transferred to [facility] and admitted to medicine on 6/23/23 for lethargy, pneumonia. Patient transferred back to [facility] on 6/28 however immediately came back to [facility] for worsening cough, hypoxia, tachycardia and chest pain.  Now being treated for unresolved PNA.    Psych CL consulted for continuity of care.    Patient seen this AM. He is tired. He states that he continues to cough, have chest pain. Does not want to go back to [facility]. Denies SI/HI. Denies AH/VH. States that he wants to go back to his group home. Explained that he will be here through the weekend for PNA. He understood.

## 2023-06-30 NOTE — PROGRESS NOTE ADULT - SUBJECTIVE AND OBJECTIVE BOX
Patient is a 33y old  Male who presents with a chief complaint of Pneumonia due to infectious organism     (30 Jun 2023 09:23)      SUBJECTIVE / OVERNIGHT EVENTS:    MEDICATIONS  (STANDING):  atorvastatin 10 milliGRAM(s) Oral at bedtime  benzonatate 100 milliGRAM(s) Oral every 8 hours  clonazePAM  Tablet 1 milliGRAM(s) Oral daily  dextrose 5%. 1000 milliLiter(s) (50 mL/Hr) IV Continuous <Continuous>  dextrose 5%. 1000 milliLiter(s) (100 mL/Hr) IV Continuous <Continuous>  dextrose 50% Injectable 25 Gram(s) IV Push once  dextrose 50% Injectable 12.5 Gram(s) IV Push once  dextrose 50% Injectable 25 Gram(s) IV Push once  divalproex DR 1500 milliGRAM(s) Oral at bedtime  enoxaparin Injectable 40 milliGRAM(s) SubCutaneous every 12 hours  ferrous    sulfate 325 milliGRAM(s) Oral at bedtime  gabapentin 400 milliGRAM(s) Oral two times a day  glucagon  Injectable 1 milliGRAM(s) IntraMuscular once  levothyroxine 50 MICROGram(s) Oral daily  loratadine 10 milliGRAM(s) Oral daily  melatonin 3 milliGRAM(s) Oral at bedtime  mirtazapine 30 milliGRAM(s) Oral at bedtime  pantoprazole    Tablet 40 milliGRAM(s) Oral before breakfast  piperacillin/tazobactam IVPB.. 3.375 Gram(s) IV Intermittent every 8 hours  risperiDONE   Tablet 4 milliGRAM(s) Oral two times a day  senna 2 Tablet(s) Oral at bedtime  tamsulosin 0.4 milliGRAM(s) Oral at bedtime  vancomycin  IVPB 1500 milliGRAM(s) IV Intermittent every 12 hours    MEDICATIONS  (PRN):  acetaminophen     Tablet .. 650 milliGRAM(s) Oral every 6 hours PRN Temp greater or equal to 38C (100.4F), Mild Pain (1 - 3)  chlorproMAZINE    Tablet 50 milliGRAM(s) Oral every 6 hours PRN Agitation  dextrose Oral Gel 15 Gram(s) Oral once PRN Blood Glucose LESS THAN 70 milliGRAM(s)/deciliter  guaiFENesin Oral Liquid (Sugar-Free) 100 milliGRAM(s) Oral every 6 hours PRN Cough  LORazepam     Tablet 2 milliGRAM(s) Oral every 6 hours PRN Agitation/Anxiety  ondansetron Injectable 4 milliGRAM(s) IV Push every 8 hours PRN Nausea and/or Vomiting      Vital Signs Last 24 Hrs  T(C): 36.7 (30 Jun 2023 09:30), Max: 36.7 (30 Jun 2023 05:51)  T(F): 98 (30 Jun 2023 09:30), Max: 98.1 (30 Jun 2023 05:51)  HR: 102 (30 Jun 2023 09:30) (92 - 102)  BP: 121/72 (30 Jun 2023 09:30) (121/72 - 138/70)  BP(mean): 93 (29 Jun 2023 12:46) (93 - 93)  RR: 19 (30 Jun 2023 09:30) (19 - 20)  SpO2: 95% (30 Jun 2023 09:30) (95% - 98%)    Parameters below as of 30 Jun 2023 09:30  Patient On (Oxygen Delivery Method): room air      CAPILLARY BLOOD GLUCOSE      POCT Blood Glucose.: 167 mg/dL (30 Jun 2023 07:34)  POCT Blood Glucose.: 222 mg/dL (29 Jun 2023 21:25)    I&O's Summary      PHYSICAL EXAM:  GENERAL: NAD, well-developed  HEAD:  Atraumatic, Normocephalic  EYES: EOMI, PERRLA, conjunctiva and sclera clear  NECK: Supple, No JVD  CHEST/LUNG: Clear to auscultation bilaterally; No wheeze  HEART: Regular rate and rhythm; No murmurs, rubs, or gallops  ABDOMEN: Soft, Nontender, Nondistended; Bowel sounds present  EXTREMITIES:  2+ Peripheral Pulses, No clubbing, cyanosis, or edema  PSYCH: AAOx3  NEUROLOGY: non-focal  SKIN: No rashes or lesions    LABS:                        13.9   7.26  )-----------( 251      ( 30 Jun 2023 07:37 )             44.6     06-30    136  |  99  |  11  ----------------------------<  182<H>  4.3   |  25  |  0.79    Ca    9.0      30 Jun 2023 07:37    TPro  6.6  /  Alb  3.8  /  TBili  <0.2  /  DBili  x   /  AST  51<H>  /  ALT  86<H>  /  AlkPhos  96  06-30          Urinalysis Basic - ( 30 Jun 2023 07:37 )    Color: x / Appearance: x / SG: x / pH: x  Gluc: 182 mg/dL / Ketone: x  / Bili: x / Urobili: x   Blood: x / Protein: x / Nitrite: x   Leuk Esterase: x / RBC: x / WBC x   Sq Epi: x / Non Sq Epi: x / Bacteria: x        RADIOLOGY & ADDITIONAL TESTS:    Imaging Personally Reviewed:    Consultant(s) Notes Reviewed:      Care Discussed with Consultants/Other Providers:   Patient is a 33y old  Male who presents with a chief complaint of Pneumonia due to infectious organism     (30 Jun 2023 09:23)      SUBJECTIVE / OVERNIGHT EVENTS: patient seen and examined by bedside, sitting up, afebrile, denies headache, dizziness, SOB, CP, Palpitations , N/V/D, abdominal pain  c/o about the pureed food but refused S&S  eval     MEDICATIONS  (STANDING):  atorvastatin 10 milliGRAM(s) Oral at bedtime  benzonatate 100 milliGRAM(s) Oral every 8 hours  clonazePAM  Tablet 1 milliGRAM(s) Oral daily  dextrose 5%. 1000 milliLiter(s) (50 mL/Hr) IV Continuous <Continuous>  dextrose 5%. 1000 milliLiter(s) (100 mL/Hr) IV Continuous <Continuous>  dextrose 50% Injectable 25 Gram(s) IV Push once  dextrose 50% Injectable 12.5 Gram(s) IV Push once  dextrose 50% Injectable 25 Gram(s) IV Push once  divalproex DR 1500 milliGRAM(s) Oral at bedtime  enoxaparin Injectable 40 milliGRAM(s) SubCutaneous every 12 hours  ferrous    sulfate 325 milliGRAM(s) Oral at bedtime  gabapentin 400 milliGRAM(s) Oral two times a day  glucagon  Injectable 1 milliGRAM(s) IntraMuscular once  levothyroxine 50 MICROGram(s) Oral daily  loratadine 10 milliGRAM(s) Oral daily  melatonin 3 milliGRAM(s) Oral at bedtime  mirtazapine 30 milliGRAM(s) Oral at bedtime  pantoprazole    Tablet 40 milliGRAM(s) Oral before breakfast  piperacillin/tazobactam IVPB.. 3.375 Gram(s) IV Intermittent every 8 hours  risperiDONE   Tablet 4 milliGRAM(s) Oral two times a day  senna 2 Tablet(s) Oral at bedtime  tamsulosin 0.4 milliGRAM(s) Oral at bedtime  vancomycin  IVPB 1500 milliGRAM(s) IV Intermittent every 12 hours    MEDICATIONS  (PRN):  acetaminophen     Tablet .. 650 milliGRAM(s) Oral every 6 hours PRN Temp greater or equal to 38C (100.4F), Mild Pain (1 - 3)  chlorproMAZINE    Tablet 50 milliGRAM(s) Oral every 6 hours PRN Agitation  dextrose Oral Gel 15 Gram(s) Oral once PRN Blood Glucose LESS THAN 70 milliGRAM(s)/deciliter  guaiFENesin Oral Liquid (Sugar-Free) 100 milliGRAM(s) Oral every 6 hours PRN Cough  LORazepam     Tablet 2 milliGRAM(s) Oral every 6 hours PRN Agitation/Anxiety  ondansetron Injectable 4 milliGRAM(s) IV Push every 8 hours PRN Nausea and/or Vomiting      Vital Signs Last 24 Hrs  T(C): 36.7 (30 Jun 2023 09:30), Max: 36.7 (30 Jun 2023 05:51)  T(F): 98 (30 Jun 2023 09:30), Max: 98.1 (30 Jun 2023 05:51)  HR: 102 (30 Jun 2023 09:30) (92 - 102)  BP: 121/72 (30 Jun 2023 09:30) (121/72 - 138/70)  BP(mean): 93 (29 Jun 2023 12:46) (93 - 93)  RR: 19 (30 Jun 2023 09:30) (19 - 20)  SpO2: 95% (30 Jun 2023 09:30) (95% - 98%)    Parameters below as of 30 Jun 2023 09:30  Patient On (Oxygen Delivery Method): room air      CAPILLARY BLOOD GLUCOSE      POCT Blood Glucose.: 167 mg/dL (30 Jun 2023 07:34)  POCT Blood Glucose.: 222 mg/dL (29 Jun 2023 21:25)    I&O's Summary      PHYSICAL EXAM:  GENERAL: NAD, well-developed  HEAD:  Atraumatic, Normocephalic  EYES: EOMI, PERRLA, conjunctiva and sclera clear  CHEST/LUNG: crackles in right base ; No wheeze  HEART: Regular rate and rhythm; No murmurs, rubs, or gallops  ABDOMEN: Soft, Nontender, Nondistended; Bowel sounds present  EXTREMITIES:  2+ Peripheral Pulses, No clubbing, cyanosis, or edema  PSYCH: AAOx3, appears annoyed   NEUROLOGY: non-focal  SKIN: No rashes or lesions    LABS:                        13.9   7.26  )-----------( 251      ( 30 Jun 2023 07:37 )             44.6     06-30    136  |  99  |  11  ----------------------------<  182<H>  4.3   |  25  |  0.79    Ca    9.0      30 Jun 2023 07:37    TPro  6.6  /  Alb  3.8  /  TBili  <0.2  /  DBili  x   /  AST  51<H>  /  ALT  86<H>  /  AlkPhos  96  06-30          Urinalysis Basic - ( 30 Jun 2023 07:37 )    Color: x / Appearance: x / SG: x / pH: x  Gluc: 182 mg/dL / Ketone: x  / Bili: x / Urobili: x   Blood: x / Protein: x / Nitrite: x   Leuk Esterase: x / RBC: x / WBC x   Sq Epi: x / Non Sq Epi: x / Bacteria: x        RADIOLOGY & ADDITIONAL TESTS:    Imaging Personally Reviewed:    Consultant(s) Notes Reviewed:  psychiatry     Care Discussed with Consultants/Other Providers:

## 2023-06-30 NOTE — BH CONSULTATION LIAISON ASSESSMENT NOTE - NSBHCHARTREVIEWLAB_PSY_A_CORE FT
13.9   7.26  )-----------( 251      ( 30 Jun 2023 07:37 )             44.6   06-30    136  |  99  |  11  ----------------------------<  182<H>  4.3   |  25  |  0.79    Ca    9.0      30 Jun 2023 07:37    TPro  6.6  /  Alb  3.8  /  TBili  <0.2  /  DBili  x   /  AST  51<H>  /  ALT  86<H>  /  AlkPhos  96  06-30

## 2023-07-01 LAB
ALBUMIN SERPL ELPH-MCNC: 3.7 G/DL — SIGNIFICANT CHANGE UP (ref 3.3–5)
ALP SERPL-CCNC: 90 U/L — SIGNIFICANT CHANGE UP (ref 40–120)
ALT FLD-CCNC: 66 U/L — HIGH (ref 4–41)
ANION GAP SERPL CALC-SCNC: 10 MMOL/L — SIGNIFICANT CHANGE UP (ref 7–14)
AST SERPL-CCNC: 25 U/L — SIGNIFICANT CHANGE UP (ref 4–40)
BILIRUB SERPL-MCNC: 0.2 MG/DL — SIGNIFICANT CHANGE UP (ref 0.2–1.2)
BUN SERPL-MCNC: 12 MG/DL — SIGNIFICANT CHANGE UP (ref 7–23)
CALCIUM SERPL-MCNC: 8.9 MG/DL — SIGNIFICANT CHANGE UP (ref 8.4–10.5)
CHLORIDE SERPL-SCNC: 101 MMOL/L — SIGNIFICANT CHANGE UP (ref 98–107)
CO2 SERPL-SCNC: 29 MMOL/L — SIGNIFICANT CHANGE UP (ref 22–31)
CREAT SERPL-MCNC: 0.81 MG/DL — SIGNIFICANT CHANGE UP (ref 0.5–1.3)
EGFR: 119 ML/MIN/1.73M2 — SIGNIFICANT CHANGE UP
GLUCOSE BLDC GLUCOMTR-MCNC: 176 MG/DL — HIGH (ref 70–99)
GLUCOSE BLDC GLUCOMTR-MCNC: 177 MG/DL — HIGH (ref 70–99)
GLUCOSE BLDC GLUCOMTR-MCNC: 224 MG/DL — HIGH (ref 70–99)
GLUCOSE BLDC GLUCOMTR-MCNC: 269 MG/DL — HIGH (ref 70–99)
GLUCOSE SERPL-MCNC: 173 MG/DL — HIGH (ref 70–99)
HCT VFR BLD CALC: 40.7 % — SIGNIFICANT CHANGE UP (ref 39–50)
HGB BLD-MCNC: 12.6 G/DL — LOW (ref 13–17)
MAGNESIUM SERPL-MCNC: 1.9 MG/DL — SIGNIFICANT CHANGE UP (ref 1.6–2.6)
MCHC RBC-ENTMCNC: 25.5 PG — LOW (ref 27–34)
MCHC RBC-ENTMCNC: 31 GM/DL — LOW (ref 32–36)
MCV RBC AUTO: 82.4 FL — SIGNIFICANT CHANGE UP (ref 80–100)
NRBC # BLD: 0 /100 WBCS — SIGNIFICANT CHANGE UP (ref 0–0)
NRBC # FLD: 0 K/UL — SIGNIFICANT CHANGE UP (ref 0–0)
PHOSPHATE SERPL-MCNC: 3.3 MG/DL — SIGNIFICANT CHANGE UP (ref 2.5–4.5)
PLATELET # BLD AUTO: 223 K/UL — SIGNIFICANT CHANGE UP (ref 150–400)
POTASSIUM SERPL-MCNC: 4.3 MMOL/L — SIGNIFICANT CHANGE UP (ref 3.5–5.3)
POTASSIUM SERPL-SCNC: 4.3 MMOL/L — SIGNIFICANT CHANGE UP (ref 3.5–5.3)
PROT SERPL-MCNC: 6 G/DL — SIGNIFICANT CHANGE UP (ref 6–8.3)
RBC # BLD: 4.94 M/UL — SIGNIFICANT CHANGE UP (ref 4.2–5.8)
RBC # FLD: 13.7 % — SIGNIFICANT CHANGE UP (ref 10.3–14.5)
SODIUM SERPL-SCNC: 140 MMOL/L — SIGNIFICANT CHANGE UP (ref 135–145)
VANCOMYCIN TROUGH SERPL-MCNC: 15.4 UG/ML — SIGNIFICANT CHANGE UP (ref 10–20)
WBC # BLD: 7.07 K/UL — SIGNIFICANT CHANGE UP (ref 3.8–10.5)
WBC # FLD AUTO: 7.07 K/UL — SIGNIFICANT CHANGE UP (ref 3.8–10.5)

## 2023-07-01 PROCEDURE — 99232 SBSQ HOSP IP/OBS MODERATE 35: CPT

## 2023-07-01 RX ADMIN — GABAPENTIN 400 MILLIGRAM(S): 400 CAPSULE ORAL at 17:23

## 2023-07-01 RX ADMIN — Medication 3 MILLIGRAM(S): at 23:20

## 2023-07-01 RX ADMIN — Medication 100 MILLIGRAM(S): at 17:26

## 2023-07-01 RX ADMIN — Medication 300 MILLIGRAM(S): at 18:11

## 2023-07-01 RX ADMIN — ENOXAPARIN SODIUM 40 MILLIGRAM(S): 100 INJECTION SUBCUTANEOUS at 06:14

## 2023-07-01 RX ADMIN — DIVALPROEX SODIUM 1500 MILLIGRAM(S): 500 TABLET, DELAYED RELEASE ORAL at 23:21

## 2023-07-01 RX ADMIN — Medication 50 MILLIGRAM(S): at 22:54

## 2023-07-01 RX ADMIN — PIPERACILLIN AND TAZOBACTAM 25 GRAM(S): 4; .5 INJECTION, POWDER, LYOPHILIZED, FOR SOLUTION INTRAVENOUS at 00:09

## 2023-07-01 RX ADMIN — TAMSULOSIN HYDROCHLORIDE 0.4 MILLIGRAM(S): 0.4 CAPSULE ORAL at 23:20

## 2023-07-01 RX ADMIN — Medication 300 MILLIGRAM(S): at 06:05

## 2023-07-01 RX ADMIN — MIRTAZAPINE 30 MILLIGRAM(S): 45 TABLET, ORALLY DISINTEGRATING ORAL at 23:20

## 2023-07-01 RX ADMIN — ENOXAPARIN SODIUM 40 MILLIGRAM(S): 100 INJECTION SUBCUTANEOUS at 17:26

## 2023-07-01 RX ADMIN — ATORVASTATIN CALCIUM 10 MILLIGRAM(S): 80 TABLET, FILM COATED ORAL at 23:20

## 2023-07-01 RX ADMIN — GUANFACINE 1 MILLIGRAM(S): 3 TABLET, EXTENDED RELEASE ORAL at 23:21

## 2023-07-01 RX ADMIN — Medication 50 MICROGRAM(S): at 08:42

## 2023-07-01 RX ADMIN — GABAPENTIN 400 MILLIGRAM(S): 400 CAPSULE ORAL at 06:04

## 2023-07-01 RX ADMIN — Medication 100 MILLIGRAM(S): at 08:02

## 2023-07-01 RX ADMIN — Medication 1: at 17:28

## 2023-07-01 RX ADMIN — Medication 1 MILLIGRAM(S): at 11:37

## 2023-07-01 RX ADMIN — Medication 1: at 11:52

## 2023-07-01 RX ADMIN — RISPERIDONE 4 MILLIGRAM(S): 4 TABLET ORAL at 08:41

## 2023-07-01 RX ADMIN — LORATADINE 10 MILLIGRAM(S): 10 TABLET ORAL at 11:37

## 2023-07-01 RX ADMIN — PIPERACILLIN AND TAZOBACTAM 25 GRAM(S): 4; .5 INJECTION, POWDER, LYOPHILIZED, FOR SOLUTION INTRAVENOUS at 10:26

## 2023-07-01 RX ADMIN — SENNA PLUS 2 TABLET(S): 8.6 TABLET ORAL at 23:20

## 2023-07-01 RX ADMIN — Medication 100 MILLIGRAM(S): at 23:21

## 2023-07-01 RX ADMIN — Medication 325 MILLIGRAM(S): at 23:20

## 2023-07-01 RX ADMIN — PIPERACILLIN AND TAZOBACTAM 25 GRAM(S): 4; .5 INJECTION, POWDER, LYOPHILIZED, FOR SOLUTION INTRAVENOUS at 17:27

## 2023-07-01 RX ADMIN — RISPERIDONE 4 MILLIGRAM(S): 4 TABLET ORAL at 17:24

## 2023-07-01 NOTE — PROGRESS NOTE ADULT - PROBLEM SELECTOR PLAN 7
-lovenox -lovenox  Dispo : back to [facility] likely after medically cleared   plan of care d/w PCA and ACP

## 2023-07-01 NOTE — PROGRESS NOTE ADULT - PROBLEM SELECTOR PLAN 3
-psych consult appreciated   -continue medications:  Klonopin 1 daily  Depakote 1500qhs  austin 400BID  melatonin 5 qhs  Remeron 30 qhs  risperidone 4BID   per psych rec PRNs: Thorazine 50 mg PO/IM q6h, Ativan 2 mg PO/IM/IV q6h for severe agitation (monitor qtc < 500).   will repeat EKG for QTC

## 2023-07-01 NOTE — PROGRESS NOTE ADULT - PROBLEM SELECTOR PLAN 1
-recent admission 6/23-6/28 for PNA treated with Ceftriaxone  -urine legionella negative last admission and azithromycin was discontinued  -now with sinus tach and tachypnea at [facility] resulting in readmission  -patient continues to endorse chest pain, SOB and cough  -CTA with multilobar right sided PNA  -broaden antibiotics to zosyn and vancomycin given concern for resistant infection. Check MRSA, if negative, can stop vanco  -repeat urine legionella   -reported cough after meals. Concern for aspiration. Speech and swallow evaluation. Dysphagia diet for now, pt refused S&S eval

## 2023-07-01 NOTE — PROGRESS NOTE ADULT - SUBJECTIVE AND OBJECTIVE BOX
Patient is a 33y old  Male who presents with a chief complaint of tachycardia (30 Jun 2023 10:52)      SUBJECTIVE / OVERNIGHT EVENTS:    MEDICATIONS  (STANDING):  atorvastatin 10 milliGRAM(s) Oral at bedtime  benzonatate 100 milliGRAM(s) Oral every 8 hours  clonazePAM  Tablet 1 milliGRAM(s) Oral daily  dextrose 5%. 1000 milliLiter(s) (50 mL/Hr) IV Continuous <Continuous>  dextrose 5%. 1000 milliLiter(s) (100 mL/Hr) IV Continuous <Continuous>  dextrose 50% Injectable 25 Gram(s) IV Push once  dextrose 50% Injectable 12.5 Gram(s) IV Push once  dextrose 50% Injectable 25 Gram(s) IV Push once  divalproex DR 1500 milliGRAM(s) Oral at bedtime  enoxaparin Injectable 40 milliGRAM(s) SubCutaneous every 12 hours  ferrous    sulfate 325 milliGRAM(s) Oral at bedtime  gabapentin 400 milliGRAM(s) Oral two times a day  glucagon  Injectable 1 milliGRAM(s) IntraMuscular once  guanFACINE 1 milliGRAM(s) Oral at bedtime  insulin lispro (ADMELOG) corrective regimen sliding scale   SubCutaneous three times a day before meals  insulin lispro (ADMELOG) corrective regimen sliding scale   SubCutaneous at bedtime  levothyroxine 50 MICROGram(s) Oral daily  loratadine 10 milliGRAM(s) Oral daily  melatonin 3 milliGRAM(s) Oral at bedtime  mirtazapine 30 milliGRAM(s) Oral at bedtime  pantoprazole    Tablet 40 milliGRAM(s) Oral before breakfast  piperacillin/tazobactam IVPB.. 3.375 Gram(s) IV Intermittent every 8 hours  risperiDONE   Tablet 4 milliGRAM(s) Oral two times a day  senna 2 Tablet(s) Oral at bedtime  tamsulosin 0.4 milliGRAM(s) Oral at bedtime  vancomycin  IVPB 1500 milliGRAM(s) IV Intermittent every 12 hours    MEDICATIONS  (PRN):  acetaminophen     Tablet .. 650 milliGRAM(s) Oral every 6 hours PRN Temp greater or equal to 38C (100.4F), Mild Pain (1 - 3)  chlorproMAZINE    Injectable 50 milliGRAM(s) IntraMuscular every 6 hours PRN agitation  chlorproMAZINE    Tablet 50 milliGRAM(s) Oral every 6 hours PRN Agitation  dextrose Oral Gel 15 Gram(s) Oral once PRN Blood Glucose LESS THAN 70 milliGRAM(s)/deciliter  guaiFENesin Oral Liquid (Sugar-Free) 100 milliGRAM(s) Oral every 6 hours PRN Cough  LORazepam     Tablet 2 milliGRAM(s) Oral every 6 hours PRN Agitation/Anxiety  LORazepam   Injectable 2 milliGRAM(s) IntraMuscular every 6 hours PRN Agitation  ondansetron Injectable 4 milliGRAM(s) IV Push every 8 hours PRN Nausea and/or Vomiting      Vital Signs Last 24 Hrs  T(C): 36.8 (01 Jul 2023 07:42), Max: 36.8 (30 Jun 2023 22:19)  T(F): 98.3 (01 Jul 2023 07:42), Max: 98.3 (30 Jun 2023 22:19)  HR: 103 (01 Jul 2023 07:42) (97 - 103)  BP: 126/72 (01 Jul 2023 07:42) (121/72 - 141/85)  BP(mean): --  RR: 18 (01 Jul 2023 07:42) (18 - 19)  SpO2: 95% (01 Jul 2023 07:42) (95% - 98%)    Parameters below as of 01 Jul 2023 07:42  Patient On (Oxygen Delivery Method): room air      CAPILLARY BLOOD GLUCOSE      POCT Blood Glucose.: 269 mg/dL (01 Jul 2023 08:19)  POCT Blood Glucose.: 247 mg/dL (30 Jun 2023 21:05)  POCT Blood Glucose.: 303 mg/dL (30 Jun 2023 16:52)  POCT Blood Glucose.: 125 mg/dL (30 Jun 2023 11:44)    I&O's Summary      PHYSICAL EXAM:  GENERAL: NAD, well-developed  HEAD:  Atraumatic, Normocephalic  EYES: EOMI, PERRLA, conjunctiva and sclera clear  NECK: Supple, No JVD  CHEST/LUNG: Clear to auscultation bilaterally; No wheeze  HEART: Regular rate and rhythm; No murmurs, rubs, or gallops  ABDOMEN: Soft, Nontender, Nondistended; Bowel sounds present  EXTREMITIES:  2+ Peripheral Pulses, No clubbing, cyanosis, or edema  PSYCH: AAOx3  NEUROLOGY: non-focal  SKIN: No rashes or lesions    LABS:                        13.9   7.26  )-----------( 251      ( 30 Jun 2023 07:37 )             44.6     06-30    136  |  99  |  11  ----------------------------<  182<H>  4.3   |  25  |  0.79    Ca    9.0      30 Jun 2023 07:37    TPro  6.6  /  Alb  3.8  /  TBili  <0.2  /  DBili  x   /  AST  51<H>  /  ALT  86<H>  /  AlkPhos  96  06-30          Urinalysis Basic - ( 30 Jun 2023 07:37 )    Color: x / Appearance: x / SG: x / pH: x  Gluc: 182 mg/dL / Ketone: x  / Bili: x / Urobili: x   Blood: x / Protein: x / Nitrite: x   Leuk Esterase: x / RBC: x / WBC x   Sq Epi: x / Non Sq Epi: x / Bacteria: x        RADIOLOGY & ADDITIONAL TESTS:    Imaging Personally Reviewed:    Consultant(s) Notes Reviewed:      Care Discussed with Consultants/Other Providers:   Patient is a 33y old  Male who presents with a chief complaint of tachycardia (30 Jun 2023 10:52)      SUBJECTIVE / OVERNIGHT EVENTS: patient seen and examined by bedside, pt calm at the time , was agitated before per PCA   pt did not receive any PRNs overnight   MEDICATIONS  (STANDING):  atorvastatin 10 milliGRAM(s) Oral at bedtime  benzonatate 100 milliGRAM(s) Oral every 8 hours  clonazePAM  Tablet 1 milliGRAM(s) Oral daily  dextrose 5%. 1000 milliLiter(s) (50 mL/Hr) IV Continuous <Continuous>  dextrose 5%. 1000 milliLiter(s) (100 mL/Hr) IV Continuous <Continuous>  dextrose 50% Injectable 25 Gram(s) IV Push once  dextrose 50% Injectable 12.5 Gram(s) IV Push once  dextrose 50% Injectable 25 Gram(s) IV Push once  divalproex DR 1500 milliGRAM(s) Oral at bedtime  enoxaparin Injectable 40 milliGRAM(s) SubCutaneous every 12 hours  ferrous    sulfate 325 milliGRAM(s) Oral at bedtime  gabapentin 400 milliGRAM(s) Oral two times a day  glucagon  Injectable 1 milliGRAM(s) IntraMuscular once  guanFACINE 1 milliGRAM(s) Oral at bedtime  insulin lispro (ADMELOG) corrective regimen sliding scale   SubCutaneous three times a day before meals  insulin lispro (ADMELOG) corrective regimen sliding scale   SubCutaneous at bedtime  levothyroxine 50 MICROGram(s) Oral daily  loratadine 10 milliGRAM(s) Oral daily  melatonin 3 milliGRAM(s) Oral at bedtime  mirtazapine 30 milliGRAM(s) Oral at bedtime  pantoprazole    Tablet 40 milliGRAM(s) Oral before breakfast  piperacillin/tazobactam IVPB.. 3.375 Gram(s) IV Intermittent every 8 hours  risperiDONE   Tablet 4 milliGRAM(s) Oral two times a day  senna 2 Tablet(s) Oral at bedtime  tamsulosin 0.4 milliGRAM(s) Oral at bedtime  vancomycin  IVPB 1500 milliGRAM(s) IV Intermittent every 12 hours    MEDICATIONS  (PRN):  acetaminophen     Tablet .. 650 milliGRAM(s) Oral every 6 hours PRN Temp greater or equal to 38C (100.4F), Mild Pain (1 - 3)  chlorproMAZINE    Injectable 50 milliGRAM(s) IntraMuscular every 6 hours PRN agitation  chlorproMAZINE    Tablet 50 milliGRAM(s) Oral every 6 hours PRN Agitation  dextrose Oral Gel 15 Gram(s) Oral once PRN Blood Glucose LESS THAN 70 milliGRAM(s)/deciliter  guaiFENesin Oral Liquid (Sugar-Free) 100 milliGRAM(s) Oral every 6 hours PRN Cough  LORazepam     Tablet 2 milliGRAM(s) Oral every 6 hours PRN Agitation/Anxiety  LORazepam   Injectable 2 milliGRAM(s) IntraMuscular every 6 hours PRN Agitation  ondansetron Injectable 4 milliGRAM(s) IV Push every 8 hours PRN Nausea and/or Vomiting      Vital Signs Last 24 Hrs  T(C): 36.8 (01 Jul 2023 07:42), Max: 36.8 (30 Jun 2023 22:19)  T(F): 98.3 (01 Jul 2023 07:42), Max: 98.3 (30 Jun 2023 22:19)  HR: 103 (01 Jul 2023 07:42) (97 - 103)  BP: 126/72 (01 Jul 2023 07:42) (121/72 - 141/85)  BP(mean): --  RR: 18 (01 Jul 2023 07:42) (18 - 19)  SpO2: 95% (01 Jul 2023 07:42) (95% - 98%)    Parameters below as of 01 Jul 2023 07:42  Patient On (Oxygen Delivery Method): room air      CAPILLARY BLOOD GLUCOSE      POCT Blood Glucose.: 269 mg/dL (01 Jul 2023 08:19)  POCT Blood Glucose.: 247 mg/dL (30 Jun 2023 21:05)  POCT Blood Glucose.: 303 mg/dL (30 Jun 2023 16:52)  POCT Blood Glucose.: 125 mg/dL (30 Jun 2023 11:44)    I&O's Summary    PHYSICAL EXAM:  GENERAL: NAD, well-developed  HEAD:  Atraumatic, Normocephalic  EYES: EOMI, PERRLA, conjunctiva and sclera clear  CHEST/LUNG: crackles in right base ; No wheeze  HEART: Regular rate and rhythm; No murmurs, rubs, or gallops  ABDOMEN: Soft, Nontender, Nondistended; Bowel sounds present  EXTREMITIES:  2+ Peripheral Pulses, No clubbing, cyanosis, or edema  PSYCH: AAOx3, calm    NEUROLOGY: non-focal  SKIN: No rashes or lesions        LABS:                        13.9   7.26  )-----------( 251      ( 30 Jun 2023 07:37 )             44.6     06-30    136  |  99  |  11  ----------------------------<  182<H>  4.3   |  25  |  0.79    Ca    9.0      30 Jun 2023 07:37    TPro  6.6  /  Alb  3.8  /  TBili  <0.2  /  DBili  x   /  AST  51<H>  /  ALT  86<H>  /  AlkPhos  96  06-30          Urinalysis Basic - ( 30 Jun 2023 07:37 )    Color: x / Appearance: x / SG: x / pH: x  Gluc: 182 mg/dL / Ketone: x  / Bili: x / Urobili: x   Blood: x / Protein: x / Nitrite: x   Leuk Esterase: x / RBC: x / WBC x   Sq Epi: x / Non Sq Epi: x / Bacteria: x        RADIOLOGY & ADDITIONAL TESTS:    Imaging Personally Reviewed:    Consultant(s) Notes Reviewed:      Care Discussed with Consultants/Other Providers:

## 2023-07-02 LAB
GLUCOSE BLDC GLUCOMTR-MCNC: 225 MG/DL — HIGH (ref 70–99)
GLUCOSE BLDC GLUCOMTR-MCNC: 283 MG/DL — HIGH (ref 70–99)
GLUCOSE BLDC GLUCOMTR-MCNC: 299 MG/DL — HIGH (ref 70–99)
GLUCOSE BLDC GLUCOMTR-MCNC: 391 MG/DL — HIGH (ref 70–99)
HCT VFR BLD CALC: 46.1 % — SIGNIFICANT CHANGE UP (ref 39–50)
HGB BLD-MCNC: 14 G/DL — SIGNIFICANT CHANGE UP (ref 13–17)
MCHC RBC-ENTMCNC: 25.6 PG — LOW (ref 27–34)
MCHC RBC-ENTMCNC: 30.4 GM/DL — LOW (ref 32–36)
MCV RBC AUTO: 84.4 FL — SIGNIFICANT CHANGE UP (ref 80–100)
MRSA PCR RESULT.: SIGNIFICANT CHANGE UP
NRBC # BLD: 0 /100 WBCS — SIGNIFICANT CHANGE UP (ref 0–0)
NRBC # FLD: 0 K/UL — SIGNIFICANT CHANGE UP (ref 0–0)
PLATELET # BLD AUTO: 238 K/UL — SIGNIFICANT CHANGE UP (ref 150–400)
RBC # BLD: 5.46 M/UL — SIGNIFICANT CHANGE UP (ref 4.2–5.8)
RBC # FLD: 13.8 % — SIGNIFICANT CHANGE UP (ref 10.3–14.5)
S AUREUS DNA NOSE QL NAA+PROBE: SIGNIFICANT CHANGE UP
WBC # BLD: 7.88 K/UL — SIGNIFICANT CHANGE UP (ref 3.8–10.5)
WBC # FLD AUTO: 7.88 K/UL — SIGNIFICANT CHANGE UP (ref 3.8–10.5)

## 2023-07-02 PROCEDURE — 99232 SBSQ HOSP IP/OBS MODERATE 35: CPT

## 2023-07-02 RX ORDER — BACITRACIN ZINC 500 UNIT/G
1 OINTMENT IN PACKET (EA) TOPICAL DAILY
Refills: 0 | Status: DISCONTINUED | OUTPATIENT
Start: 2023-07-02 | End: 2023-07-13

## 2023-07-02 RX ADMIN — Medication 3: at 07:56

## 2023-07-02 RX ADMIN — Medication 1 APPLICATION(S): at 11:44

## 2023-07-02 RX ADMIN — ENOXAPARIN SODIUM 40 MILLIGRAM(S): 100 INJECTION SUBCUTANEOUS at 17:06

## 2023-07-02 RX ADMIN — PIPERACILLIN AND TAZOBACTAM 25 GRAM(S): 4; .5 INJECTION, POWDER, LYOPHILIZED, FOR SOLUTION INTRAVENOUS at 13:02

## 2023-07-02 RX ADMIN — RISPERIDONE 4 MILLIGRAM(S): 4 TABLET ORAL at 07:48

## 2023-07-02 RX ADMIN — Medication 1 MILLIGRAM(S): at 13:04

## 2023-07-02 RX ADMIN — MIRTAZAPINE 30 MILLIGRAM(S): 45 TABLET, ORALLY DISINTEGRATING ORAL at 21:16

## 2023-07-02 RX ADMIN — RISPERIDONE 4 MILLIGRAM(S): 4 TABLET ORAL at 17:07

## 2023-07-02 RX ADMIN — GABAPENTIN 400 MILLIGRAM(S): 400 CAPSULE ORAL at 07:53

## 2023-07-02 RX ADMIN — Medication 3: at 21:33

## 2023-07-02 RX ADMIN — Medication 325 MILLIGRAM(S): at 21:15

## 2023-07-02 RX ADMIN — Medication 3 MILLIGRAM(S): at 21:15

## 2023-07-02 RX ADMIN — PANTOPRAZOLE SODIUM 40 MILLIGRAM(S): 20 TABLET, DELAYED RELEASE ORAL at 07:54

## 2023-07-02 RX ADMIN — Medication 300 MILLIGRAM(S): at 17:08

## 2023-07-02 RX ADMIN — PIPERACILLIN AND TAZOBACTAM 25 GRAM(S): 4; .5 INJECTION, POWDER, LYOPHILIZED, FOR SOLUTION INTRAVENOUS at 01:13

## 2023-07-02 RX ADMIN — DIVALPROEX SODIUM 1500 MILLIGRAM(S): 500 TABLET, DELAYED RELEASE ORAL at 21:16

## 2023-07-02 RX ADMIN — PIPERACILLIN AND TAZOBACTAM 25 GRAM(S): 4; .5 INJECTION, POWDER, LYOPHILIZED, FOR SOLUTION INTRAVENOUS at 23:15

## 2023-07-02 RX ADMIN — Medication 50 MICROGRAM(S): at 07:54

## 2023-07-02 RX ADMIN — ATORVASTATIN CALCIUM 10 MILLIGRAM(S): 80 TABLET, FILM COATED ORAL at 21:15

## 2023-07-02 RX ADMIN — GUANFACINE 1 MILLIGRAM(S): 3 TABLET, EXTENDED RELEASE ORAL at 21:15

## 2023-07-02 RX ADMIN — Medication 3: at 11:46

## 2023-07-02 RX ADMIN — GABAPENTIN 400 MILLIGRAM(S): 400 CAPSULE ORAL at 17:07

## 2023-07-02 RX ADMIN — SENNA PLUS 2 TABLET(S): 8.6 TABLET ORAL at 21:15

## 2023-07-02 RX ADMIN — LORATADINE 10 MILLIGRAM(S): 10 TABLET ORAL at 13:02

## 2023-07-02 RX ADMIN — ENOXAPARIN SODIUM 40 MILLIGRAM(S): 100 INJECTION SUBCUTANEOUS at 07:49

## 2023-07-02 RX ADMIN — Medication 2: at 17:06

## 2023-07-02 RX ADMIN — TAMSULOSIN HYDROCHLORIDE 0.4 MILLIGRAM(S): 0.4 CAPSULE ORAL at 21:15

## 2023-07-02 NOTE — PROGRESS NOTE ADULT - PROBLEM SELECTOR PLAN 1
-recent admission 6/23-6/28 for PNA treated with Ceftriaxone  -urine legionella negative last admission and azithromycin was discontinued  -now with sinus tach and tachypnea at [facility] resulting in readmission  -patient continues to endorse chest pain, SOB and cough  -CTA with multilobar right sided PNA  -broaden antibiotics to zosyn and vancomycin given concern for resistant infection. Check MRSA, if negative, can stop vanco  -repeat urine legionella   -reported cough after meals. Concern for aspiration. Speech and swallow evaluation. Dysphagia diet for now, pt refused S&S eval -recent admission 6/23-6/28 for PNA treated with Ceftriaxone  -urine legionella negative last admission and azithromycin was discontinued  -now with sinus tach and tachypnea at [facility] resulting in readmission  -patient continues to endorse chest pain, SOB and cough  -CTA with multilobar right sided PNA  -broaden antibiotics to zosyn and vancomycin given concern for resistant infection. Check MRSA, if negative, can stop vanco, will treat for 5 day course   -repeat urine legionella   -reported cough after meals. Concern for aspiration. Speech and swallow evaluation. Dysphagia diet for now, pt refused S&S eval

## 2023-07-02 NOTE — PROGRESS NOTE ADULT - SUBJECTIVE AND OBJECTIVE BOX
Patient is a 33y old  Male who presents with a chief complaint of tachycardia (01 Jul 2023 09:12)      SUBJECTIVE / OVERNIGHT EVENTS: pt receive Thorazine overnight PRN     MEDICATIONS  (STANDING):  atorvastatin 10 milliGRAM(s) Oral at bedtime  clonazePAM  Tablet 1 milliGRAM(s) Oral daily  dextrose 5%. 1000 milliLiter(s) (100 mL/Hr) IV Continuous <Continuous>  dextrose 5%. 1000 milliLiter(s) (50 mL/Hr) IV Continuous <Continuous>  dextrose 50% Injectable 25 Gram(s) IV Push once  dextrose 50% Injectable 12.5 Gram(s) IV Push once  dextrose 50% Injectable 25 Gram(s) IV Push once  divalproex DR 1500 milliGRAM(s) Oral at bedtime  enoxaparin Injectable 40 milliGRAM(s) SubCutaneous every 12 hours  ferrous    sulfate 325 milliGRAM(s) Oral at bedtime  gabapentin 400 milliGRAM(s) Oral two times a day  glucagon  Injectable 1 milliGRAM(s) IntraMuscular once  guanFACINE 1 milliGRAM(s) Oral at bedtime  insulin lispro (ADMELOG) corrective regimen sliding scale   SubCutaneous at bedtime  insulin lispro (ADMELOG) corrective regimen sliding scale   SubCutaneous three times a day before meals  levothyroxine 50 MICROGram(s) Oral daily  loratadine 10 milliGRAM(s) Oral daily  melatonin 3 milliGRAM(s) Oral at bedtime  mirtazapine 30 milliGRAM(s) Oral at bedtime  pantoprazole    Tablet 40 milliGRAM(s) Oral before breakfast  piperacillin/tazobactam IVPB.. 3.375 Gram(s) IV Intermittent every 8 hours  risperiDONE   Tablet 4 milliGRAM(s) Oral two times a day  senna 2 Tablet(s) Oral at bedtime  tamsulosin 0.4 milliGRAM(s) Oral at bedtime  vancomycin  IVPB 1500 milliGRAM(s) IV Intermittent every 12 hours    MEDICATIONS  (PRN):  acetaminophen     Tablet .. 650 milliGRAM(s) Oral every 6 hours PRN Temp greater or equal to 38C (100.4F), Mild Pain (1 - 3)  chlorproMAZINE    Injectable 50 milliGRAM(s) IntraMuscular every 6 hours PRN agitation  chlorproMAZINE    Tablet 50 milliGRAM(s) Oral every 6 hours PRN Agitation  dextrose Oral Gel 15 Gram(s) Oral once PRN Blood Glucose LESS THAN 70 milliGRAM(s)/deciliter  guaiFENesin Oral Liquid (Sugar-Free) 100 milliGRAM(s) Oral every 6 hours PRN Cough  LORazepam     Tablet 2 milliGRAM(s) Oral every 6 hours PRN Agitation/Anxiety  LORazepam   Injectable 2 milliGRAM(s) IntraMuscular every 6 hours PRN Agitation  ondansetron Injectable 4 milliGRAM(s) IV Push every 8 hours PRN Nausea and/or Vomiting      Vital Signs Last 24 Hrs  T(C): 37.2 (01 Jul 2023 21:16), Max: 37.2 (01 Jul 2023 21:16)  T(F): 98.9 (01 Jul 2023 21:16), Max: 98.9 (01 Jul 2023 21:16)  HR: 113 (01 Jul 2023 21:16) (90 - 113)  BP: 149/102 (01 Jul 2023 21:16) (101/61 - 149/102)  BP(mean): --  RR: 18 (01 Jul 2023 21:16) (18 - 18)  SpO2: 96% (01 Jul 2023 21:16) (93% - 97%)    Parameters below as of 01 Jul 2023 21:16  Patient On (Oxygen Delivery Method): room air      CAPILLARY BLOOD GLUCOSE      POCT Blood Glucose.: 299 mg/dL (02 Jul 2023 07:50)  POCT Blood Glucose.: 224 mg/dL (01 Jul 2023 21:08)  POCT Blood Glucose.: 176 mg/dL (01 Jul 2023 16:44)  POCT Blood Glucose.: 177 mg/dL (01 Jul 2023 11:43)    I&O's Summary      PHYSICAL EXAM:  GENERAL: NAD, well-developed  HEAD:  Atraumatic, Normocephalic  EYES: EOMI, PERRLA, conjunctiva and sclera clear  NECK: Supple, No JVD  CHEST/LUNG: Clear to auscultation bilaterally; No wheeze  HEART: Regular rate and rhythm; No murmurs, rubs, or gallops  ABDOMEN: Soft, Nontender, Nondistended; Bowel sounds present  EXTREMITIES:  2+ Peripheral Pulses, No clubbing, cyanosis, or edema  PSYCH: AAOx3  NEUROLOGY: non-focal  SKIN: No rashes or lesions    LABS:                        12.6   7.07  )-----------( 223      ( 01 Jul 2023 11:50 )             40.7     07-01    140  |  101  |  12  ----------------------------<  173<H>  4.3   |  29  |  0.81    Ca    8.9      01 Jul 2023 11:50  Phos  3.3     07-01  Mg     1.90     07-01    TPro  6.0  /  Alb  3.7  /  TBili  0.2  /  DBili  x   /  AST  25  /  ALT  66<H>  /  AlkPhos  90  07-01          Urinalysis Basic - ( 01 Jul 2023 11:50 )    Color: x / Appearance: x / SG: x / pH: x  Gluc: 173 mg/dL / Ketone: x  / Bili: x / Urobili: x   Blood: x / Protein: x / Nitrite: x   Leuk Esterase: x / RBC: x / WBC x   Sq Epi: x / Non Sq Epi: x / Bacteria: x        RADIOLOGY & ADDITIONAL TESTS:    Imaging Personally Reviewed:    Consultant(s) Notes Reviewed:      Care Discussed with Consultants/Other Providers:   Patient is a 33y old  Male who presents with a chief complaint of tachycardia (01 Jul 2023 09:12)      SUBJECTIVE / OVERNIGHT EVENTS: patient seen and examined by bedside , pt sitting up in chair, appears calm, reports cough, requesting for repeat CXR     pt receive Thorazine overnight PRN     MEDICATIONS  (STANDING):  atorvastatin 10 milliGRAM(s) Oral at bedtime  clonazePAM  Tablet 1 milliGRAM(s) Oral daily  dextrose 5%. 1000 milliLiter(s) (100 mL/Hr) IV Continuous <Continuous>  dextrose 5%. 1000 milliLiter(s) (50 mL/Hr) IV Continuous <Continuous>  dextrose 50% Injectable 25 Gram(s) IV Push once  dextrose 50% Injectable 12.5 Gram(s) IV Push once  dextrose 50% Injectable 25 Gram(s) IV Push once  divalproex DR 1500 milliGRAM(s) Oral at bedtime  enoxaparin Injectable 40 milliGRAM(s) SubCutaneous every 12 hours  ferrous    sulfate 325 milliGRAM(s) Oral at bedtime  gabapentin 400 milliGRAM(s) Oral two times a day  glucagon  Injectable 1 milliGRAM(s) IntraMuscular once  guanFACINE 1 milliGRAM(s) Oral at bedtime  insulin lispro (ADMELOG) corrective regimen sliding scale   SubCutaneous at bedtime  insulin lispro (ADMELOG) corrective regimen sliding scale   SubCutaneous three times a day before meals  levothyroxine 50 MICROGram(s) Oral daily  loratadine 10 milliGRAM(s) Oral daily  melatonin 3 milliGRAM(s) Oral at bedtime  mirtazapine 30 milliGRAM(s) Oral at bedtime  pantoprazole    Tablet 40 milliGRAM(s) Oral before breakfast  piperacillin/tazobactam IVPB.. 3.375 Gram(s) IV Intermittent every 8 hours  risperiDONE   Tablet 4 milliGRAM(s) Oral two times a day  senna 2 Tablet(s) Oral at bedtime  tamsulosin 0.4 milliGRAM(s) Oral at bedtime  vancomycin  IVPB 1500 milliGRAM(s) IV Intermittent every 12 hours    MEDICATIONS  (PRN):  acetaminophen     Tablet .. 650 milliGRAM(s) Oral every 6 hours PRN Temp greater or equal to 38C (100.4F), Mild Pain (1 - 3)  chlorproMAZINE    Injectable 50 milliGRAM(s) IntraMuscular every 6 hours PRN agitation  chlorproMAZINE    Tablet 50 milliGRAM(s) Oral every 6 hours PRN Agitation  dextrose Oral Gel 15 Gram(s) Oral once PRN Blood Glucose LESS THAN 70 milliGRAM(s)/deciliter  guaiFENesin Oral Liquid (Sugar-Free) 100 milliGRAM(s) Oral every 6 hours PRN Cough  LORazepam     Tablet 2 milliGRAM(s) Oral every 6 hours PRN Agitation/Anxiety  LORazepam   Injectable 2 milliGRAM(s) IntraMuscular every 6 hours PRN Agitation  ondansetron Injectable 4 milliGRAM(s) IV Push every 8 hours PRN Nausea and/or Vomiting      Vital Signs Last 24 Hrs  T(C): 37.2 (01 Jul 2023 21:16), Max: 37.2 (01 Jul 2023 21:16)  T(F): 98.9 (01 Jul 2023 21:16), Max: 98.9 (01 Jul 2023 21:16)  HR: 113 (01 Jul 2023 21:16) (90 - 113)  BP: 149/102 (01 Jul 2023 21:16) (101/61 - 149/102)  BP(mean): --  RR: 18 (01 Jul 2023 21:16) (18 - 18)  SpO2: 96% (01 Jul 2023 21:16) (93% - 97%)    Parameters below as of 01 Jul 2023 21:16  Patient On (Oxygen Delivery Method): room air      CAPILLARY BLOOD GLUCOSE      POCT Blood Glucose.: 299 mg/dL (02 Jul 2023 07:50)  POCT Blood Glucose.: 224 mg/dL (01 Jul 2023 21:08)  POCT Blood Glucose.: 176 mg/dL (01 Jul 2023 16:44)  POCT Blood Glucose.: 177 mg/dL (01 Jul 2023 11:43)    I&O's Summary      PHYSICAL EXAM:  GENERAL: NAD, well-developed  HEAD:  Atraumatic, Normocephalic  EYES: EOMI, PERRLA, conjunctiva and sclera clear  CHEST/LUNG: crackles in right base ; No wheeze  HEART: Regular rate and rhythm; No murmurs, rubs, or gallops  ABDOMEN: Soft, Nontender, Nondistended; Bowel sounds present  EXTREMITIES:  2+ Peripheral Pulses, No clubbing, cyanosis, or edema  PSYCH: AAOx3, calm    NEUROLOGY: non-focal  SKIN: No rashes or lesions        LABS:                        12.6   7.07  )-----------( 223      ( 01 Jul 2023 11:50 )             40.7     07-01    140  |  101  |  12  ----------------------------<  173<H>  4.3   |  29  |  0.81    Ca    8.9      01 Jul 2023 11:50  Phos  3.3     07-01  Mg     1.90     07-01    TPro  6.0  /  Alb  3.7  /  TBili  0.2  /  DBili  x   /  AST  25  /  ALT  66<H>  /  AlkPhos  90  07-01          Urinalysis Basic - ( 01 Jul 2023 11:50 )    Color: x / Appearance: x / SG: x / pH: x  Gluc: 173 mg/dL / Ketone: x  / Bili: x / Urobili: x   Blood: x / Protein: x / Nitrite: x   Leuk Esterase: x / RBC: x / WBC x   Sq Epi: x / Non Sq Epi: x / Bacteria: x        RADIOLOGY & ADDITIONAL TESTS:    Imaging Personally Reviewed:    Consultant(s) Notes Reviewed:      Care Discussed with Consultants/Other Providers:

## 2023-07-02 NOTE — PROGRESS NOTE ADULT - PROBLEM SELECTOR PLAN 7
-lovenox  Dispo : back to [facility] likely after medically cleared   plan of care d/w PCA and ACP -lovenox  Dispo : back to [facility] likely after medically cleared   plan of care d/w PCA and ACP  plan of care discussed with Mom on the phone, updates provided, all questions answered

## 2023-07-03 ENCOUNTER — TRANSCRIPTION ENCOUNTER (OUTPATIENT)
Age: 33
End: 2023-07-03

## 2023-07-03 LAB
ALBUMIN SERPL ELPH-MCNC: 3.6 G/DL — SIGNIFICANT CHANGE UP (ref 3.3–5)
ALP SERPL-CCNC: 96 U/L — SIGNIFICANT CHANGE UP (ref 40–120)
ALT FLD-CCNC: 49 U/L — HIGH (ref 4–41)
ANION GAP SERPL CALC-SCNC: 12 MMOL/L — SIGNIFICANT CHANGE UP (ref 7–14)
AST SERPL-CCNC: 22 U/L — SIGNIFICANT CHANGE UP (ref 4–40)
BILIRUB SERPL-MCNC: <0.2 MG/DL — SIGNIFICANT CHANGE UP (ref 0.2–1.2)
BUN SERPL-MCNC: 7 MG/DL — SIGNIFICANT CHANGE UP (ref 7–23)
CALCIUM SERPL-MCNC: 8.6 MG/DL — SIGNIFICANT CHANGE UP (ref 8.4–10.5)
CHLORIDE SERPL-SCNC: 99 MMOL/L — SIGNIFICANT CHANGE UP (ref 98–107)
CO2 SERPL-SCNC: 25 MMOL/L — SIGNIFICANT CHANGE UP (ref 22–31)
CREAT SERPL-MCNC: 0.76 MG/DL — SIGNIFICANT CHANGE UP (ref 0.5–1.3)
EGFR: 121 ML/MIN/1.73M2 — SIGNIFICANT CHANGE UP
GLUCOSE BLDC GLUCOMTR-MCNC: 130 MG/DL — HIGH (ref 70–99)
GLUCOSE BLDC GLUCOMTR-MCNC: 166 MG/DL — HIGH (ref 70–99)
GLUCOSE BLDC GLUCOMTR-MCNC: 204 MG/DL — HIGH (ref 70–99)
GLUCOSE BLDC GLUCOMTR-MCNC: 350 MG/DL — HIGH (ref 70–99)
GLUCOSE SERPL-MCNC: 227 MG/DL — HIGH (ref 70–99)
HCT VFR BLD CALC: 41.3 % — SIGNIFICANT CHANGE UP (ref 39–50)
HGB BLD-MCNC: 13.2 G/DL — SIGNIFICANT CHANGE UP (ref 13–17)
MAGNESIUM SERPL-MCNC: 1.9 MG/DL — SIGNIFICANT CHANGE UP (ref 1.6–2.6)
MCHC RBC-ENTMCNC: 26 PG — LOW (ref 27–34)
MCHC RBC-ENTMCNC: 32 GM/DL — SIGNIFICANT CHANGE UP (ref 32–36)
MCV RBC AUTO: 81.3 FL — SIGNIFICANT CHANGE UP (ref 80–100)
NRBC # BLD: 0 /100 WBCS — SIGNIFICANT CHANGE UP (ref 0–0)
NRBC # FLD: 0 K/UL — SIGNIFICANT CHANGE UP (ref 0–0)
PHOSPHATE SERPL-MCNC: 3.7 MG/DL — SIGNIFICANT CHANGE UP (ref 2.5–4.5)
PLATELET # BLD AUTO: 203 K/UL — SIGNIFICANT CHANGE UP (ref 150–400)
POTASSIUM SERPL-MCNC: 4.2 MMOL/L — SIGNIFICANT CHANGE UP (ref 3.5–5.3)
POTASSIUM SERPL-SCNC: 4.2 MMOL/L — SIGNIFICANT CHANGE UP (ref 3.5–5.3)
PROT SERPL-MCNC: 6 G/DL — SIGNIFICANT CHANGE UP (ref 6–8.3)
RBC # BLD: 5.08 M/UL — SIGNIFICANT CHANGE UP (ref 4.2–5.8)
RBC # FLD: 13.3 % — SIGNIFICANT CHANGE UP (ref 10.3–14.5)
SODIUM SERPL-SCNC: 136 MMOL/L — SIGNIFICANT CHANGE UP (ref 135–145)
WBC # BLD: 7.71 K/UL — SIGNIFICANT CHANGE UP (ref 3.8–10.5)
WBC # FLD AUTO: 7.71 K/UL — SIGNIFICANT CHANGE UP (ref 3.8–10.5)

## 2023-07-03 PROCEDURE — 99232 SBSQ HOSP IP/OBS MODERATE 35: CPT

## 2023-07-03 RX ADMIN — Medication 2: at 22:22

## 2023-07-03 RX ADMIN — GUANFACINE 1 MILLIGRAM(S): 3 TABLET, EXTENDED RELEASE ORAL at 22:14

## 2023-07-03 RX ADMIN — MIRTAZAPINE 30 MILLIGRAM(S): 45 TABLET, ORALLY DISINTEGRATING ORAL at 22:15

## 2023-07-03 RX ADMIN — GABAPENTIN 400 MILLIGRAM(S): 400 CAPSULE ORAL at 17:42

## 2023-07-03 RX ADMIN — RISPERIDONE 4 MILLIGRAM(S): 4 TABLET ORAL at 17:43

## 2023-07-03 RX ADMIN — Medication 325 MILLIGRAM(S): at 22:16

## 2023-07-03 RX ADMIN — SENNA PLUS 2 TABLET(S): 8.6 TABLET ORAL at 22:14

## 2023-07-03 RX ADMIN — Medication 50 MICROGRAM(S): at 11:41

## 2023-07-03 RX ADMIN — Medication 1 APPLICATION(S): at 11:40

## 2023-07-03 RX ADMIN — LORATADINE 10 MILLIGRAM(S): 10 TABLET ORAL at 11:41

## 2023-07-03 RX ADMIN — ENOXAPARIN SODIUM 40 MILLIGRAM(S): 100 INJECTION SUBCUTANEOUS at 17:43

## 2023-07-03 RX ADMIN — PIPERACILLIN AND TAZOBACTAM 25 GRAM(S): 4; .5 INJECTION, POWDER, LYOPHILIZED, FOR SOLUTION INTRAVENOUS at 22:19

## 2023-07-03 RX ADMIN — Medication 1 MILLIGRAM(S): at 11:41

## 2023-07-03 RX ADMIN — ATORVASTATIN CALCIUM 10 MILLIGRAM(S): 80 TABLET, FILM COATED ORAL at 22:15

## 2023-07-03 RX ADMIN — PIPERACILLIN AND TAZOBACTAM 25 GRAM(S): 4; .5 INJECTION, POWDER, LYOPHILIZED, FOR SOLUTION INTRAVENOUS at 13:30

## 2023-07-03 RX ADMIN — Medication 2: at 17:38

## 2023-07-03 RX ADMIN — TAMSULOSIN HYDROCHLORIDE 0.4 MILLIGRAM(S): 0.4 CAPSULE ORAL at 22:14

## 2023-07-03 RX ADMIN — DIVALPROEX SODIUM 1500 MILLIGRAM(S): 500 TABLET, DELAYED RELEASE ORAL at 22:16

## 2023-07-03 RX ADMIN — Medication 3 MILLIGRAM(S): at 22:15

## 2023-07-03 NOTE — BH CONSULTATION LIAISON PROGRESS NOTE - NSBHASSESSMENTFT_PSY_ALL_CORE
32 yo M, single, disabled, non-caregiver, resides at a [facility], with psych h/o moderate ID, ASD, impulse control disorder, factitious disorder, HAVEN, mood disorders, PTSD and ADHD, multiple past psych admissions (last known [facility] 6/13/23-present), numerous ED visits for both medical/psych complaints, longstanding h/o SIB (head banging, cutting), but no known SA, longstanding h/o endorsing SI, h/o aggression toward staff at , PMH significant for asthma, DM, urinary retention, GERD, BPH, hypothyroidism, HLD, HTN, and b/l myopia,  who was admitted to [facility] ML4  for self inflicted laceration to abdomen, endorsing CAH to hurt himself and staff at group home. Pt transferred to [facility] and admitted to medicine on 6/23/23 for lethargy, pneumonia. Patient transferred back to [facility] on 6/28 however immediately came back to [facility] for worsening cough, hypoxia, tachycardia and chest pain.  Now being treated for unresolved PNA.    Patient likely close to baseline. Please repeat EKG as QTC > 500, but was tachycardic.  If QTC > 500 will need to hold risperidone and not use thorazine as PRN. PLEASE CALL PSYCH CL IF THAT IS THE CASE    7/3: pt seen, a&o, calm, cooperative, perseverating on wanting to go back to group home, denies si/hi, denies ah/vh, collateral obtained from patient's mother, who has safety concerns and wants patient to return to [facility] to complete treatment    Recommendations-  --> Continue 1:1 at bedside due to recent SI  -->Continue current standing medications as ordered on [facility] ML4:   	Depakote 1500mg qhs,   	Intuniv ER 4mg qhs (may need to be ordered as non-formulary),   	risperidone 4 mg BID,   	Remeron 30 mg qhs, klonopin 1 mg daily, Gabapentin 400 mg TID.  --> PRNs: Thorazine 50 mg PO/IM q6h, Ativan 2 mg PO/IM/IV q6h for severe agitation (monitor qtc < 500). Pt with documented allergy on [place] to Haldol and Zyprexa   Dispo: return to [facility] when medically stable

## 2023-07-03 NOTE — PROGRESS NOTE ADULT - SUBJECTIVE AND OBJECTIVE BOX
[facility] Division of Hospital Medicine  Bertrand MOSQUEDA ArmandPierce) MD Tae  Pager [phone]    SUBJECTIVE:  Chief complaint: PNA.    Pt seen and evaluated at bedside. States he wants to go - does not want to return to [facility]. States he was cleared by . He has no SOB. Tolerating PO. Aware he had SLP eval pending, but did not like what they were doing, hence refusal for eval. He continues to maintain that stance on refusing eval. Otherwise, tolerating current diet.       ROS: All systems negative except as noted.      Vital Signs Last 24 Hrs  T(C): 36.3 (03 Jul 2023 06:30), Max: 36.8 (02 Jul 2023 21:00)  T(F): 97.4 (03 Jul 2023 06:30), Max: 98.2 (02 Jul 2023 21:00)  HR: 82 (03 Jul 2023 06:30) (82 - 102)  BP: 143/85 (03 Jul 2023 06:30) (127/87 - 144/86)  BP(mean): --  RR: 16 (03 Jul 2023 10:00) (16 - 18)  SpO2: 96% (03 Jul 2023 10:00) (96% - 100%)    Parameters below as of 03 Jul 2023 10:00  Patient On (Oxygen Delivery Method): room air      PHYSICAL EXAM:  Gen- In bed, comfortable, NAD  Resp- CTAB, good effort.   CVS- RRR, S1S2, no g/r/m.  GI- Soft abd, NT, ND, +BSx4  Ext- No C/C.        MEDICATION:  MEDICATIONS  (STANDING):  atorvastatin 10 milliGRAM(s) Oral at bedtime  bacitracin   Ointment 1 Application(s) Topical daily  clonazePAM  Tablet 1 milliGRAM(s) Oral daily  dextrose 5%. 1000 milliLiter(s) (100 mL/Hr) IV Continuous <Continuous>  dextrose 5%. 1000 milliLiter(s) (50 mL/Hr) IV Continuous <Continuous>  dextrose 50% Injectable 25 Gram(s) IV Push once  dextrose 50% Injectable 12.5 Gram(s) IV Push once  dextrose 50% Injectable 25 Gram(s) IV Push once  divalproex DR 1500 milliGRAM(s) Oral at bedtime  enoxaparin Injectable 40 milliGRAM(s) SubCutaneous every 12 hours  ferrous    sulfate 325 milliGRAM(s) Oral at bedtime  gabapentin 400 milliGRAM(s) Oral two times a day  glucagon  Injectable 1 milliGRAM(s) IntraMuscular once  guanFACINE 1 milliGRAM(s) Oral at bedtime  insulin lispro (ADMELOG) corrective regimen sliding scale   SubCutaneous at bedtime  insulin lispro (ADMELOG) corrective regimen sliding scale   SubCutaneous three times a day before meals  levothyroxine 50 MICROGram(s) Oral daily  loratadine 10 milliGRAM(s) Oral daily  melatonin 3 milliGRAM(s) Oral at bedtime  mirtazapine 30 milliGRAM(s) Oral at bedtime  pantoprazole    Tablet 40 milliGRAM(s) Oral before breakfast  piperacillin/tazobactam IVPB.. 3.375 Gram(s) IV Intermittent every 8 hours  risperiDONE   Tablet 4 milliGRAM(s) Oral two times a day  senna 2 Tablet(s) Oral at bedtime  tamsulosin 0.4 milliGRAM(s) Oral at bedtime    MEDICATIONS  (PRN):  acetaminophen     Tablet .. 650 milliGRAM(s) Oral every 6 hours PRN Temp greater or equal to 38C (100.4F), Mild Pain (1 - 3)  chlorproMAZINE    Injectable 50 milliGRAM(s) IntraMuscular every 6 hours PRN agitation  chlorproMAZINE    Tablet 50 milliGRAM(s) Oral every 6 hours PRN Agitation  dextrose Oral Gel 15 Gram(s) Oral once PRN Blood Glucose LESS THAN 70 milliGRAM(s)/deciliter  guaiFENesin Oral Liquid (Sugar-Free) 100 milliGRAM(s) Oral every 6 hours PRN Cough  LORazepam     Tablet 2 milliGRAM(s) Oral every 6 hours PRN Agitation/Anxiety  LORazepam   Injectable 2 milliGRAM(s) IntraMuscular every 6 hours PRN Agitation  ondansetron Injectable 4 milliGRAM(s) IV Push every 8 hours PRN Nausea and/or Vomiting            LABORATORY:                          13.2   7.71  )-----------( 203 ( 03 Jul 2023 06:15 )             41.3     07-03    136  |  99  |  7   ----------------------------<  227<H>  4.2   |  25  |  0.76    Ca    8.6      03 Jul 2023 06:15  Phos  3.7     07-03  Mg     1.90     07-03    TPro  6.0  /  Alb  3.6  /  TBili  <0.2  /  DBili  x   /  AST  22  /  ALT  49<H>  /  AlkPhos  96  07-03      Urinalysis Basic - ( 03 Jul 2023 06:15 )    Color: x / Appearance: x / SG: x / pH: x  Gluc: 227 mg/dL / Ketone: x  / Bili: x / Urobili: x   Blood: x / Protein: x / Nitrite: x   Leuk Esterase: x / RBC: x / WBC x   Sq Epi: x / Non Sq Epi: x / Bacteria: x            SARS-CoV-2: NotDetec (29 Jun 2023 09:31)  COVID-19 PCR: NotDetec (26 Jun 2023 09:29)  COVID-19 PCR: NotDetec (22 Jun 2023 13:18)  COVID-19 PCR: NotDetec (18 Jun 2023 17:16)  COVID-19 PCR: NotDetec (09 Jun 2023 19:59)  SARS-CoV-2: NotDetec (27 May 2023 01:09)  COVID-19 PCR: NotDetec (12 Apr 2023 18:15)

## 2023-07-03 NOTE — DISCHARGE NOTE PROVIDER - HOSPITAL COURSE
33-year-old male past medical history of intellectual disability, obesity, asthma, autism, impulse control disorder and hyperlipidemia with recent admission for PNA treated with ceftriaxone now returning for tachypnea and tachycardia and admitted for unresolved PNA    Unresolved pneumonia.    -recent admission 6/23-6/28 for PNA treated with Ceftriaxone  -urine legionella negative last admission and azithromycin was discontinued  -now with sinus tach and tachypnea at [facility] resulting in readmission  -patient continues to endorse chest pain, SOB and cough  -CTA with multilobar right sided PNA  -broaden antibiotics to zosyn and vancomycin given concern for resistant infection. Check MRSA, if negative, can stop vanco, will treat for 5 day course   -repeat urine legionella   -reported cough after meals. Concern for aspiration. Speech and swallow evaluation. Dysphagia diet for now, pt refused S&S eval.  : Chest pain.   ·  Plan: -Cardiac enzyme 7, ekg non ischemic  -CTA negative for PE  -Sinus tach likely driven by agitation, anxiety, underlying psychiatric condition??  -TSH WNL  -no need for tele.   Impulse control disorder.   ·  Plan: -psych consult appreciated   -continue medications:  Klonopin 1 daily  Depakote 1500qhs  [place] 400BID  melatonin 5 qhs  Remeron 30 qhs  risperidone 4BID   per psych rec PRNs: Thorazine 50 mg PO/IM q6h, Ativan 2 mg PO/IM/IV q6h for severe agitation (monitor qtc < 500).   will repeat EKG for QTC.     Autism.   ·  Plan: -redirect as needed.     Hypothyroid.   ·  Plan: -c/w home dose synthroid  -TSH 3.32.       DM2 (diabetes mellitus, type 2).   ·  Plan: -sliding scale while in patient   -monitor FS  -c/w statin.     33M intellectual disability, obesity, asthma, autism, impulse control disorder and hyperlipidemia with recent admission for PNA treated with ceftriaxone now returning for tachypnea and tachycardia and admitted for unresolved PNA      Unresolved pneumonia.   -CTA with multilobar right sided PNA  - s/p zosyn  - S&S regular diet    Chest pain. Cardiac enzyme 7, ekg non ischemic  - CTPA negative for PE  - Sinus tach likely driven by agitation, anxiety, underlying psychiatric condition??  -TSH WNL     Impulse control disorder.   Klonopin 1 daily, Depakote 1500qhs, [place] 400BID, melatonin 5 qhs, Remeron 30 qhs, risperidone 4BID     Hypothyroid.  -c/w home dose synthroid TSH 3.32.     DM2 (diabetes mellitus, type 2).  resume metformin     [facility] 33M intellectual disability, obesity, asthma, autism, impulse control disorder and testicular CA s/p orchiectomy with recent admission for PNA treated with ceftriaxone now returning for tachypnea and tachycardia and admitted for unresolved PNA. Patient admitted to medicine, completed course of Zosyn with resolution of symptoms.     Endorsing testicular pain, US showing "3 mm hypoechoic focus is seen in the region of the right mediastinum testis possibly present on the prior study from 9/29/2022. Close interval follow-up is recommended to exclude malignancy." --> Discussed with urology, recommend close outpatient follow-up for repeat US. No inpatient intervention. Per patient's mother, follows with urology at [place]. Discussed need for repeat US after discharge.     BH followed patient throughout admission. Patient medically optimized for discharge to [facility] for further management.

## 2023-07-03 NOTE — PROGRESS NOTE ADULT - PROBLEM SELECTOR PLAN 7
-lovenox  Dispo : back to [facility] after completing ABx. Will f/u psych SW for DC. Needs COVID swab.

## 2023-07-03 NOTE — DISCHARGE NOTE PROVIDER - NSDCMRMEDTOKEN_GEN_ALL_CORE_FT
atorvastatin 10 mg oral tablet: 1 tab(s) orally once a day (at bedtime)  bacitracin 500 units/g topical ointment: 1 Apply topically to affected area 2 times a day  clonazePAM 1 mg oral tablet: 1 tab(s) orally once a day  divalproex sodium 500 mg oral delayed release tablet: 3 tab(s) orally once a day (at bedtime)  ferrous sulfate 325 mg (65 mg elemental iron) oral tablet: 1 tab(s) orally once a day (at bedtime)  gabapentin 400 mg oral capsule: 1 cap(s) orally 2 times a day  guaiFENesin 100 mg/5 mL oral liquid: 10 milliliter(s) orally every 6 hours As needed Cough  levothyroxine 50 mcg (0.05 mg) oral tablet: 1 tab(s) orally once a day  loratadine 10 mg oral tablet: 1 tab(s) orally once a day  Melatonin 5 mg oral tablet: 1 tab(s) orally once a day (at bedtime)  metFORMIN 1000 mg oral tablet: 1 tab(s) orally 2 times a day (with meals)  mirtazapine 30 mg oral tablet: 1 tab(s) orally once a day (at bedtime)  pantoprazole 40 mg oral delayed release tablet: 1 tab(s) orally once a day  risperiDONE 4 mg oral tablet: 1 tab(s) orally 2 times a day  senna leaf extract oral tablet: 2 tab(s) orally once a day (at bedtime)  tamsulosin 0.4 mg oral capsule: 1 cap(s) orally once a day (at bedtime)   acetaminophen 325 mg oral tablet: 2 tab(s) orally every 6 hours As needed Temp greater or equal to 38C (100.4F), Mild Pain (1 - 3)  atorvastatin 10 mg oral tablet: 1 tab(s) orally once a day (at bedtime)  bacitracin 500 units/g topical ointment: 1 Apply topically to affected area 2 times a day  clonazePAM 1 mg oral tablet: 1 tab(s) orally once a day  divalproex sodium 500 mg oral delayed release tablet: 3 tab(s) orally once a day (at bedtime)  ferrous sulfate 325 mg (65 mg elemental iron) oral tablet: 1 tab(s) orally once a day (at bedtime)  gabapentin 400 mg oral capsule: 1 cap(s) orally 2 times a day  guaiFENesin 100 mg/5 mL oral liquid: 10 milliliter(s) orally every 6 hours As needed Cough  guanFACINE 1 mg oral tablet: 1 tab(s) orally once a day (at bedtime)  levothyroxine 50 mcg (0.05 mg) oral tablet: 1 tab(s) orally once a day  loratadine 10 mg oral tablet: 1 tab(s) orally once a day  melatonin 3 mg oral tablet: 1 tab(s) orally once a day (at bedtime)  metFORMIN 1000 mg oral tablet: 1 tab(s) orally 2 times a day (with meals)  mirtazapine 30 mg oral tablet: 1 tab(s) orally once a day (at bedtime)  pantoprazole 40 mg oral delayed release tablet: 1 tab(s) orally once a day  risperiDONE 4 mg oral tablet: 1 tab(s) orally 2 times a day  senna leaf extract oral tablet: 2 tab(s) orally once a day (at bedtime)  tamsulosin 0.4 mg oral capsule: 1 cap(s) orally once a day (at bedtime)   acetaminophen 325 mg oral tablet: 2 tab(s) orally every 6 hours As needed Temp greater or equal to 38C (100.4F), Mild Pain (1 - 3)  atorvastatin 10 mg oral tablet: 1 tab(s) orally once a day (at bedtime)  bacitracin 500 units/g topical ointment: 1 Apply topically to affected area 2 times a day  clonazePAM 1 mg oral tablet: 1 tab(s) orally once a day  divalproex sodium 500 mg oral delayed release tablet: 3 tab(s) orally once a day (at bedtime)  ferrous sulfate 325 mg (65 mg elemental iron) oral tablet: 1 tab(s) orally once a day (at bedtime)  gabapentin 400 mg oral capsule: 1 cap(s) orally 2 times a day  guaiFENesin 100 mg/5 mL oral liquid: 10 milliliter(s) orally every 6 hours As needed Cough  guanFACINE 1 mg oral tablet: 1 tab(s) orally once a day (at bedtime)  insulin glargine 100 units/mL subcutaneous solution: 15 unit(s) subcutaneous once a day (at bedtime)  insulin lispro 100 units/mL injectable solution: 5 unit(s) injectable 3 times a day (with meals)  levothyroxine 50 mcg (0.05 mg) oral tablet: 1 tab(s) orally once a day  loratadine 10 mg oral tablet: 1 tab(s) orally once a day  melatonin 3 mg oral tablet: 1 tab(s) orally once a day (at bedtime)  metFORMIN 1000 mg oral tablet: 1 tab(s) orally 2 times a day (with meals)  mirtazapine 30 mg oral tablet: 1 tab(s) orally once a day (at bedtime)  pantoprazole 40 mg oral delayed release tablet: 1 tab(s) orally once a day  risperiDONE 3 mg oral tablet: 1 tab(s) orally once a day  risperiDONE 4 mg oral tablet: 1 tab(s) orally once a day (at bedtime)  senna leaf extract oral tablet: 2 tab(s) orally once a day (at bedtime)  tamsulosin 0.4 mg oral capsule: 1 cap(s) orally once a day (at bedtime)   acetaminophen 325 mg oral tablet: 2 tab(s) orally every 6 hours As needed Temp greater or equal to 38C (100.4F), Mild Pain (1 - 3)  atorvastatin 10 mg oral tablet: 1 tab(s) orally once a day (at bedtime)  bacitracin 500 units/g topical ointment: 1 Apply topically to affected area 2 times a day  clonazePAM 1 mg oral tablet: 1 tab(s) orally once a day  divalproex sodium 500 mg oral delayed release tablet: 3 tab(s) orally once a day (at bedtime)  ferrous sulfate 325 mg (65 mg elemental iron) oral tablet: 1 tab(s) orally once a day (at bedtime)  gabapentin 400 mg oral capsule: 1 cap(s) orally 2 times a day  guaiFENesin 100 mg/5 mL oral liquid: 10 milliliter(s) orally every 6 hours As needed Cough  guanFACINE 1 mg oral tablet: 1 tab(s) orally once a day (at bedtime)  insulin glargine 100 units/mL subcutaneous solution: 15 unit(s) subcutaneous once a day (at bedtime)  insulin lispro 100 units/mL injectable solution: 5 unit(s) injectable 3 times a day (with meals)  levothyroxine 50 mcg (0.05 mg) oral tablet: 1 tab(s) orally once a day  loratadine 10 mg oral tablet: 1 tab(s) orally once a day  melatonin 3 mg oral tablet: 1 tab(s) orally once a day (at bedtime)  metFORMIN 1000 mg oral tablet: 1 tab(s) orally 2 times a day (with meals)  mirtazapine 30 mg oral tablet: 1 tab(s) orally once a day (at bedtime)  nystatin 100,000 units/g topical powder: Apply topically to affected area 2 times a day to b/l  groin &amp; scrotum  pantoprazole 40 mg oral delayed release tablet: 1 tab(s) orally once a day  risperiDONE 3 mg oral tablet: 1 tab(s) orally once a day  risperiDONE 4 mg oral tablet: 1 tab(s) orally once a day (at bedtime)  senna leaf extract oral tablet: 2 tab(s) orally once a day (at bedtime)  tamsulosin 0.4 mg oral capsule: 1 cap(s) orally once a day (at bedtime)

## 2023-07-03 NOTE — DISCHARGE NOTE PROVIDER - NSFOLLOWUPCLINICS_GEN_ALL_ED_FT
[facility]  Psychiatry  [address] [place] [address]  Phone: [phone]  Fax:   Established Patient

## 2023-07-03 NOTE — DISCHARGE NOTE PROVIDER - NSDCCPCAREPLAN_GEN_ALL_CORE_FT
PRINCIPAL DISCHARGE DIAGNOSIS  Diagnosis: Unresolved pneumonia  Assessment and Plan of Treatment: You were treated with antibiotics with improvement      SECONDARY DISCHARGE DIAGNOSES  Diagnosis: DM2 (diabetes mellitus, type 2)  Assessment and Plan of Treatment: Continue consistent carbohydrate diet.  Monitor blood glucose levels throughout the day before meals and at bedtime.  Record blood sugars and bring to outpatient providers appointment in order to be reviewed by your doctor for management modifications.  Be aware of diabetes management symptoms including feeling cool and clammy, which may be related to low glucose levels.  Feeling hot and dry may indicate high glucose levels.  If  you feel these symptoms, check your blood sugar.  Make regular podiatry appointments in order to have feet checked for wounds and toe nails cut by a doctor to prevent infections.      Diagnosis: Hypothyroid  Assessment and Plan of Treatment: please continue you synthyroid    Diagnosis: Impulse control disorder  Assessment and Plan of Treatment: continue your medications, Follow up with [facility] Psychiatrist     PRINCIPAL DISCHARGE DIAGNOSIS  Diagnosis: Unresolved pneumonia  Assessment and Plan of Treatment: You were treated with a course of IV antibiotics with improvement in your symptoms. If you experience fevers, cough or SOB, please return to the ER.      SECONDARY DISCHARGE DIAGNOSES  Diagnosis: History of testicular cancer  Assessment and Plan of Treatment: You had an ultrasound that showed a "3 mm hypoechoic focus is seen in the region of the right mediastinum testis possibly present on the prior study from 9/29/2022." Close interval follow-up is recommended to exclude malignancy. Please follow-up closely with your urologist and oncologist. You should have a repeat testicular ultrasound in about 1 month.    Diagnosis: Impulse control disorder  Assessment and Plan of Treatment: Continue your medications. Follow up with your Psychiatrist    Diagnosis: DM2 (diabetes mellitus, type 2)  Assessment and Plan of Treatment: Continue consistent carbohydrate diet.  Monitor blood glucose levels throughout the day before meals and at bedtime.  Record blood sugars and bring to outpatient providers appointment in order to be reviewed by your doctor for management modifications.  Be aware of diabetes management symptoms including feeling cool and clammy, which may be related to low glucose levels.  Feeling hot and dry may indicate high glucose levels.  If  you feel these symptoms, check your blood sugar.  Make regular podiatry appointments in order to have feet checked for wounds and toe nails cut by a doctor to prevent infections.

## 2023-07-03 NOTE — PROGRESS NOTE ADULT - PROBLEM SELECTOR PLAN 1
-recent admission 6/23-6/28 for PNA treated with Ceftriaxone  -urine legionella negative last admission and azithromycin was discontinued  -now with sinus tach and tachypnea at [facility] resulting in readmission  -patient continues to endorse chest pain, SOB and cough  -CTA with multilobar right sided PNA  -MRSA neg - vanco DC-ed.  -Planning for Zosyn x5 days - today is day 5.  -repeat urine legionella   -reported cough after meals. Concern for aspiration. Speech and swallow evaluation. Dysphagia diet for now, pt still reufsing S&S eval. At this time - he no longer is reporting coughing w/ PO intake. -recent admission 6/23-6/28 for PNA treated with Ceftriaxone  -urine legionella negative last admission and azithromycin was discontinued  -now with sinus tach and tachypnea at [facility] resulting in readmission  -patient continues to endorse chest pain, SOB and cough  -CTA with multilobar right sided PNA  -MRSA neg - vanco DC-ed.  -reported cough after meals. Concern for aspiration. Speech and swallow evaluation. Dysphagia diet for now, pt still reufsing S&S eval. At this time - he no longer is reporting coughing w/ PO intake.  -->Planning for Zosyn x5 days - today is day 5.  -Ambulatory sats normal.  -Can be DC back to [facility] once bed available after completing abx 7/3

## 2023-07-03 NOTE — DISCHARGE NOTE PROVIDER - ATTENDING DISCHARGE PHYSICAL EXAMINATION:
CONSTITUTIONAL: obese male in NAD, well-developed, well-groomed  EYES: Conjunctiva and sclera clear  ENMT: Moist oral mucosa  RESPIRATORY: Normal respiratory effort  MUSCULOSKELETAL:  No clubbing or cyanosis of digits; No joint swelling or tenderness to palpation  PSYCH: easily agitated    Patient seen and examined, PNA resolved. Medically stable for discharge to [facility] when bed is available.   CONSTITUTIONAL: obese male in NAD, well-developed, well-groomed  EYES: Conjunctiva and sclera clear  ENMT: Moist oral mucosa  RESPIRATORY: Normal respiratory effort  MUSCULOSKELETAL:  No clubbing or cyanosis of digits; No joint swelling or tenderness to palpation  PSYCH: easily agitated    Patient seen and examined, PNA resolved. Medically stable for discharge to [facility] when bed is available.  Spoke w/ patient's mother Liz ([phone]) and updated.

## 2023-07-03 NOTE — DISCHARGE NOTE PROVIDER - CARE PROVIDER_API CALL
Followup with PCP,   Phone: (   )    -  Fax: (   )    -  Follow Up Time:    Gabi Watson  Hematology  [address] [place] [id number]  Phone: [phone]  Fax: [phone]  Follow Up Time:

## 2023-07-03 NOTE — BH CONSULTATION LIAISON PROGRESS NOTE - NSBHCHARTREVIEWLAB_PSY_A_CORE FT
CBC Full  -  ( 03 Jul 2023 06:15 )  WBC Count : 7.71 K/uL  RBC Count : 5.08 M/uL  Hemoglobin : 13.2 g/dL  Hematocrit : 41.3 %  Platelet Count - Automated : 203 K/uL  Mean Cell Volume : 81.3 fL  Mean Cell Hemoglobin : 26.0 pg  Mean Cell Hemoglobin Concentration : 32.0 gm/dL  Auto Neutrophil # : x  Auto Lymphocyte # : x  Auto Monocyte # : x  Auto Eosinophil # : x  Auto Basophil # : x  Auto Neutrophil % : x  Auto Lymphocyte % : x  Auto Monocyte % : x  Auto Eosinophil % : x  Auto Basophil % : x  07-03    136  |  99  |  7   ----------------------------<  227<H>  4.2   |  25  |  0.76    Ca    8.6      03 Jul 2023 06:15  Phos  3.7     07-03  Mg     1.90     07-03    TPro  6.0  /  Alb  3.6  /  TBili  <0.2  /  DBili  x   /  AST  22  /  ALT  49<H>  /  AlkPhos  96  07-03

## 2023-07-04 LAB
B PERT DNA SPEC QL NAA+PROBE: SIGNIFICANT CHANGE UP
B PERT+PARAPERT DNA PNL SPEC NAA+PROBE: SIGNIFICANT CHANGE UP
BORDETELLA PARAPERTUSSIS (RAPRVP): SIGNIFICANT CHANGE UP
C PNEUM DNA SPEC QL NAA+PROBE: SIGNIFICANT CHANGE UP
CULTURE RESULTS: SIGNIFICANT CHANGE UP
CULTURE RESULTS: SIGNIFICANT CHANGE UP
FLUAV SUBTYP SPEC NAA+PROBE: SIGNIFICANT CHANGE UP
FLUBV RNA SPEC QL NAA+PROBE: SIGNIFICANT CHANGE UP
GLUCOSE BLDC GLUCOMTR-MCNC: 193 MG/DL — HIGH (ref 70–99)
GLUCOSE BLDC GLUCOMTR-MCNC: 218 MG/DL — HIGH (ref 70–99)
GLUCOSE BLDC GLUCOMTR-MCNC: 219 MG/DL — HIGH (ref 70–99)
GLUCOSE BLDC GLUCOMTR-MCNC: 275 MG/DL — HIGH (ref 70–99)
HADV DNA SPEC QL NAA+PROBE: SIGNIFICANT CHANGE UP
HCOV 229E RNA SPEC QL NAA+PROBE: SIGNIFICANT CHANGE UP
HCOV HKU1 RNA SPEC QL NAA+PROBE: SIGNIFICANT CHANGE UP
HCOV NL63 RNA SPEC QL NAA+PROBE: SIGNIFICANT CHANGE UP
HCOV OC43 RNA SPEC QL NAA+PROBE: SIGNIFICANT CHANGE UP
HMPV RNA SPEC QL NAA+PROBE: SIGNIFICANT CHANGE UP
HPIV1 RNA SPEC QL NAA+PROBE: SIGNIFICANT CHANGE UP
HPIV2 RNA SPEC QL NAA+PROBE: SIGNIFICANT CHANGE UP
HPIV3 RNA SPEC QL NAA+PROBE: SIGNIFICANT CHANGE UP
HPIV4 RNA SPEC QL NAA+PROBE: SIGNIFICANT CHANGE UP
M PNEUMO DNA SPEC QL NAA+PROBE: SIGNIFICANT CHANGE UP
RAPID RVP RESULT: SIGNIFICANT CHANGE UP
RSV RNA SPEC QL NAA+PROBE: SIGNIFICANT CHANGE UP
RV+EV RNA SPEC QL NAA+PROBE: SIGNIFICANT CHANGE UP
SARS-COV-2 RNA SPEC QL NAA+PROBE: SIGNIFICANT CHANGE UP
SPECIMEN SOURCE: SIGNIFICANT CHANGE UP
SPECIMEN SOURCE: SIGNIFICANT CHANGE UP

## 2023-07-04 PROCEDURE — 93010 ELECTROCARDIOGRAM REPORT: CPT

## 2023-07-04 PROCEDURE — 99232 SBSQ HOSP IP/OBS MODERATE 35: CPT

## 2023-07-04 RX ADMIN — ENOXAPARIN SODIUM 40 MILLIGRAM(S): 100 INJECTION SUBCUTANEOUS at 17:13

## 2023-07-04 RX ADMIN — GABAPENTIN 400 MILLIGRAM(S): 400 CAPSULE ORAL at 17:13

## 2023-07-04 RX ADMIN — Medication 2: at 07:53

## 2023-07-04 RX ADMIN — RISPERIDONE 4 MILLIGRAM(S): 4 TABLET ORAL at 06:08

## 2023-07-04 RX ADMIN — Medication 3: at 17:13

## 2023-07-04 RX ADMIN — ATORVASTATIN CALCIUM 10 MILLIGRAM(S): 80 TABLET, FILM COATED ORAL at 22:17

## 2023-07-04 RX ADMIN — TAMSULOSIN HYDROCHLORIDE 0.4 MILLIGRAM(S): 0.4 CAPSULE ORAL at 22:17

## 2023-07-04 RX ADMIN — SENNA PLUS 2 TABLET(S): 8.6 TABLET ORAL at 22:17

## 2023-07-04 RX ADMIN — Medication 50 MICROGRAM(S): at 06:08

## 2023-07-04 RX ADMIN — Medication 1 APPLICATION(S): at 11:42

## 2023-07-04 RX ADMIN — GABAPENTIN 400 MILLIGRAM(S): 400 CAPSULE ORAL at 06:09

## 2023-07-04 RX ADMIN — LORATADINE 10 MILLIGRAM(S): 10 TABLET ORAL at 11:44

## 2023-07-04 RX ADMIN — MIRTAZAPINE 30 MILLIGRAM(S): 45 TABLET, ORALLY DISINTEGRATING ORAL at 22:17

## 2023-07-04 RX ADMIN — Medication 1: at 11:43

## 2023-07-04 RX ADMIN — Medication 1 MILLIGRAM(S): at 11:46

## 2023-07-04 RX ADMIN — Medication 325 MILLIGRAM(S): at 22:17

## 2023-07-04 RX ADMIN — GUANFACINE 1 MILLIGRAM(S): 3 TABLET, EXTENDED RELEASE ORAL at 22:17

## 2023-07-04 RX ADMIN — DIVALPROEX SODIUM 1500 MILLIGRAM(S): 500 TABLET, DELAYED RELEASE ORAL at 22:18

## 2023-07-04 RX ADMIN — RISPERIDONE 4 MILLIGRAM(S): 4 TABLET ORAL at 17:13

## 2023-07-04 RX ADMIN — Medication 3 MILLIGRAM(S): at 22:18

## 2023-07-04 RX ADMIN — PANTOPRAZOLE SODIUM 40 MILLIGRAM(S): 20 TABLET, DELAYED RELEASE ORAL at 06:08

## 2023-07-04 NOTE — PROGRESS NOTE ADULT - PROBLEM SELECTOR PLAN 7
-lovenox  Dispo: F/u psych SW in AM - dispo to [facility].. COVID neg. Amb sat normal. Can f/u SLP in AM to advance diet.

## 2023-07-04 NOTE — PROGRESS NOTE ADULT - PROBLEM SELECTOR PLAN 3
-psych consult appreciated   -continue medications:  Klonopin 1 daily  Depakote 1500qhs  austin 400BID  melatonin 5 qhs  Remeron 30 qhs  risperidone 4BID  Per psych rec PRNs: Thorazine 50 mg PO/IM q6h, Ativan 2 mg PO/IM/IV q6h for severe agitation (monitor qtc < 500).   F/u EKG.

## 2023-07-04 NOTE — PROGRESS NOTE ADULT - SUBJECTIVE AND OBJECTIVE BOX
[facility] Division of Hospital Medicine  Bertrand MOSQUEDA ArmandPierce) MD Tae  Pager [phone]    SUBJECTIVE:  Chief complaint: PNA.    Pt seen and evaluated at bedside this AM. No o/n events. Denies any SOB/CP/NV. Tolerating diet. Now in agreement to be re-eval by SLP. Spoke w/ sitter -- pt doing ok. No issues at this time.      ROS: All systems negative except as noted.      Vital Signs Last 24 Hrs  T(C): 36.7 (04 Jul 2023 06:00), Max: 36.9 (03 Jul 2023 22:15)  T(F): 98.1 (04 Jul 2023 06:00), Max: 98.4 (03 Jul 2023 22:15)  HR: 87 (04 Jul 2023 06:00) (85 - 90)  BP: 104/82 (04 Jul 2023 06:00) (104/82 - 161/95)  BP(mean): --  RR: 19 (04 Jul 2023 06:00) (18 - 19)  SpO2: 96% (04 Jul 2023 06:00) (96% - 98%)    Parameters below as of 04 Jul 2023 06:00  Patient On (Oxygen Delivery Method): room air      PHYSICAL EXAM:  Gen- In bed, comfortable, NAD  Resp- CTAB, good effort.   CVS- RRR, S1S2, no g/r/m.  GI- Soft abd, NT, ND, +BSx4  Ext- No C/C.      MEDICATION:  MEDICATIONS  (STANDING):  atorvastatin 10 milliGRAM(s) Oral at bedtime  bacitracin   Ointment 1 Application(s) Topical daily  clonazePAM  Tablet 1 milliGRAM(s) Oral daily  dextrose 5%. 1000 milliLiter(s) (50 mL/Hr) IV Continuous <Continuous>  dextrose 5%. 1000 milliLiter(s) (100 mL/Hr) IV Continuous <Continuous>  dextrose 50% Injectable 25 Gram(s) IV Push once  dextrose 50% Injectable 12.5 Gram(s) IV Push once  dextrose 50% Injectable 25 Gram(s) IV Push once  divalproex DR 1500 milliGRAM(s) Oral at bedtime  enoxaparin Injectable 40 milliGRAM(s) SubCutaneous every 12 hours  ferrous    sulfate 325 milliGRAM(s) Oral at bedtime  gabapentin 400 milliGRAM(s) Oral two times a day  glucagon  Injectable 1 milliGRAM(s) IntraMuscular once  guanFACINE 1 milliGRAM(s) Oral at bedtime  insulin lispro (ADMELOG) corrective regimen sliding scale   SubCutaneous at bedtime  insulin lispro (ADMELOG) corrective regimen sliding scale   SubCutaneous three times a day before meals  levothyroxine 50 MICROGram(s) Oral daily  loratadine 10 milliGRAM(s) Oral daily  melatonin 3 milliGRAM(s) Oral at bedtime  mirtazapine 30 milliGRAM(s) Oral at bedtime  pantoprazole    Tablet 40 milliGRAM(s) Oral before breakfast  risperiDONE   Tablet 4 milliGRAM(s) Oral two times a day  senna 2 Tablet(s) Oral at bedtime  tamsulosin 0.4 milliGRAM(s) Oral at bedtime    MEDICATIONS  (PRN):  acetaminophen     Tablet .. 650 milliGRAM(s) Oral every 6 hours PRN Temp greater or equal to 38C (100.4F), Mild Pain (1 - 3)  chlorproMAZINE    Injectable 50 milliGRAM(s) IntraMuscular every 6 hours PRN agitation  chlorproMAZINE    Tablet 50 milliGRAM(s) Oral every 6 hours PRN Agitation  dextrose Oral Gel 15 Gram(s) Oral once PRN Blood Glucose LESS THAN 70 milliGRAM(s)/deciliter  guaiFENesin Oral Liquid (Sugar-Free) 100 milliGRAM(s) Oral every 6 hours PRN Cough  LORazepam     Tablet 2 milliGRAM(s) Oral every 6 hours PRN Agitation/Anxiety  LORazepam   Injectable 2 milliGRAM(s) IntraMuscular every 6 hours PRN Agitation  ondansetron Injectable 4 milliGRAM(s) IV Push every 8 hours PRN Nausea and/or Vomiting        LABORATORY:                          13.2   7.71  )-----------( 203 ( 03 Jul 2023 06:15 )             41.3     07-03    136  |  99  |  7   ----------------------------<  227<H>  4.2   |  25  |  0.76    Ca    8.6      03 Jul 2023 06:15  Phos  3.7     07-03  Mg     1.90     07-03    TPro  6.0  /  Alb  3.6  /  TBili  <0.2  /  DBili  x   /  AST  22  /  ALT  49<H>  /  AlkPhos  96  07-03      Urinalysis Basic - ( 03 Jul 2023 06:15 )    Color: x / Appearance: x / SG: x / pH: x  Gluc: 227 mg/dL / Ketone: x  / Bili: x / Urobili: x   Blood: x / Protein: x / Nitrite: x   Leuk Esterase: x / RBC: x / WBC x   Sq Epi: x / Non Sq Epi: x / Bacteria: x            SARS-CoV-2: NotDetec (04 Jul 2023 06:41)  SARS-CoV-2: NotDetec (29 Jun 2023 09:31)  COVID-19 PCR: NotDetec (26 Jun 2023 09:29)  COVID-19 PCR: NotDetec (22 Jun 2023 13:18)  COVID-19 PCR: NotDetec (18 Jun 2023 17:16)  COVID-19 PCR: NotDetec (09 Jun 2023 19:59)  SARS-CoV-2: NotDetec (27 May 2023 01:09)  COVID-19 PCR: NotDetec (12 Apr 2023 18:15)

## 2023-07-04 NOTE — PROGRESS NOTE ADULT - PROBLEM SELECTOR PLAN 1
-recent admission 6/23-6/28 for PNA treated with Ceftriaxone  -urine legionella negative last admission and azithromycin was discontinued  -now with sinus tach and tachypnea at [facility] resulting in readmission  -patient continues to endorse chest pain, SOB and cough  -CTA with multilobar right sided PNA  -MRSA neg - vanco DC-ed.  -reported cough after meals. Concern for aspiration. Speech and swallow evaluation. Dysphagia diet for now, pt still reufsing S&S eval. At this time - he no longer is reporting coughing w/ PO intake.  -->Completed 5d of Zosyn.  -Ambulatory sats normal.  -Can return to [facility] -recent admission 6/23-6/28 for PNA treated with Ceftriaxone  -urine legionella negative last admission and azithromycin was discontinued  -now with sinus tach and tachypnea at [facility] resulting in readmission  -patient continues to endorse chest pain, SOB and cough  -CTA with multilobar right sided PNA  -MRSA neg - vanco DC-ed. Bcx negative to date.  -reported cough after meals. Concern for aspiration. Speech and swallow evaluation. Dysphagia diet for now, pt still reufsing S&S eval. At this time - he no longer is reporting coughing w/ PO intake.  -->Completed 5d of Zosyn.  -Ambulatory sats normal.  -Can return to [facility]

## 2023-07-04 NOTE — PROGRESS NOTE ADULT - PROBLEM SELECTOR PLAN 6
-sliding scale while in patient   -monitor FS  -c/w statin -sliding scale while in patient   -monitor FS  -c/w statin  -resume usual PO med on DC.

## 2023-07-05 DIAGNOSIS — K76.0 FATTY (CHANGE OF) LIVER, NOT ELSEWHERE CLASSIFIED: ICD-10-CM

## 2023-07-05 LAB
GLUCOSE BLDC GLUCOMTR-MCNC: 116 MG/DL — HIGH (ref 70–99)
GLUCOSE BLDC GLUCOMTR-MCNC: 133 MG/DL — HIGH (ref 70–99)
GLUCOSE BLDC GLUCOMTR-MCNC: 274 MG/DL — HIGH (ref 70–99)
GLUCOSE BLDC GLUCOMTR-MCNC: 288 MG/DL — HIGH (ref 70–99)
GLUCOSE BLDC GLUCOMTR-MCNC: 340 MG/DL — HIGH (ref 70–99)

## 2023-07-05 PROCEDURE — 99232 SBSQ HOSP IP/OBS MODERATE 35: CPT

## 2023-07-05 RX ORDER — INSULIN GLARGINE 100 [IU]/ML
15 INJECTION, SOLUTION SUBCUTANEOUS AT BEDTIME
Refills: 0 | Status: DISCONTINUED | OUTPATIENT
Start: 2023-07-05 | End: 2023-07-13

## 2023-07-05 RX ORDER — INSULIN LISPRO 100/ML
5 VIAL (ML) SUBCUTANEOUS
Refills: 0 | Status: DISCONTINUED | OUTPATIENT
Start: 2023-07-05 | End: 2023-07-13

## 2023-07-05 RX ORDER — GUANFACINE 3 MG/1
1 TABLET, EXTENDED RELEASE ORAL
Qty: 0 | Refills: 0 | DISCHARGE
Start: 2023-07-05

## 2023-07-05 RX ORDER — LANOLIN ALCOHOL/MO/W.PET/CERES
1 CREAM (GRAM) TOPICAL
Qty: 0 | Refills: 0 | DISCHARGE
Start: 2023-07-05

## 2023-07-05 RX ORDER — ACETAMINOPHEN 500 MG
2 TABLET ORAL
Qty: 0 | Refills: 0 | DISCHARGE
Start: 2023-07-05

## 2023-07-05 RX ADMIN — DIVALPROEX SODIUM 1500 MILLIGRAM(S): 500 TABLET, DELAYED RELEASE ORAL at 22:04

## 2023-07-05 RX ADMIN — PANTOPRAZOLE SODIUM 40 MILLIGRAM(S): 20 TABLET, DELAYED RELEASE ORAL at 05:03

## 2023-07-05 RX ADMIN — TAMSULOSIN HYDROCHLORIDE 0.4 MILLIGRAM(S): 0.4 CAPSULE ORAL at 22:05

## 2023-07-05 RX ADMIN — GABAPENTIN 400 MILLIGRAM(S): 400 CAPSULE ORAL at 19:01

## 2023-07-05 RX ADMIN — LORATADINE 10 MILLIGRAM(S): 10 TABLET ORAL at 11:34

## 2023-07-05 RX ADMIN — GUANFACINE 1 MILLIGRAM(S): 3 TABLET, EXTENDED RELEASE ORAL at 22:04

## 2023-07-05 RX ADMIN — ATORVASTATIN CALCIUM 10 MILLIGRAM(S): 80 TABLET, FILM COATED ORAL at 22:04

## 2023-07-05 RX ADMIN — Medication 3: at 12:16

## 2023-07-05 RX ADMIN — Medication 50 MICROGRAM(S): at 05:03

## 2023-07-05 RX ADMIN — Medication 1 APPLICATION(S): at 11:38

## 2023-07-05 RX ADMIN — Medication 325 MILLIGRAM(S): at 22:04

## 2023-07-05 RX ADMIN — RISPERIDONE 4 MILLIGRAM(S): 4 TABLET ORAL at 05:04

## 2023-07-05 RX ADMIN — MIRTAZAPINE 30 MILLIGRAM(S): 45 TABLET, ORALLY DISINTEGRATING ORAL at 22:05

## 2023-07-05 RX ADMIN — Medication 1: at 22:06

## 2023-07-05 RX ADMIN — SENNA PLUS 2 TABLET(S): 8.6 TABLET ORAL at 22:05

## 2023-07-05 RX ADMIN — INSULIN GLARGINE 15 UNIT(S): 100 INJECTION, SOLUTION SUBCUTANEOUS at 22:05

## 2023-07-05 RX ADMIN — RISPERIDONE 4 MILLIGRAM(S): 4 TABLET ORAL at 19:01

## 2023-07-05 RX ADMIN — GABAPENTIN 400 MILLIGRAM(S): 400 CAPSULE ORAL at 05:04

## 2023-07-05 RX ADMIN — Medication 1 MILLIGRAM(S): at 11:39

## 2023-07-05 RX ADMIN — Medication 3 MILLIGRAM(S): at 22:04

## 2023-07-05 NOTE — PROGRESS NOTE ADULT - PROBLEM SELECTOR PLAN 1
Recent admission 6/23-6/28 for PNA treated with Ceftriaxone; no WBC elevation on arrival RVP neg  - CTPA: no PE, Tree-in-bud and nodular infiltrates scattered throughout the right upper and lower lobes.  - blood cultures NG x 5 days  - s/p Zosyn 5 days  - S&S on 7/5, regular diet   - RA and amb sat 7/5 wnl

## 2023-07-05 NOTE — BH CONSULTATION LIAISON PROGRESS NOTE - NSBHASSESSMENTFT_PSY_ALL_CORE
34 yo M, single, disabled, non-caregiver, resides at a [facility], with psych h/o moderate ID, ASD, impulse control disorder, factitious disorder, HAVEN, mood disorders, PTSD and ADHD, multiple past psych admissions (last known [facility] 6/13/23-present), numerous ED visits for both medical/psych complaints, longstanding h/o SIB (head banging, cutting), but no known SA, longstanding h/o endorsing SI, h/o aggression toward staff at , PMH significant for asthma, DM, urinary retention, GERD, BPH, hypothyroidism, HLD, HTN, and b/l myopia,  who was admitted to [facility]  for self inflicted laceration to abdomen, endorsing CAH to hurt himself and staff at group home. Pt transferred to [facility] and admitted to medicine on 6/23/23 for lethargy, pneumonia. Patient transferred back to [facility] on 6/28 however immediately came back to [facility] for worsening cough, hypoxia, tachycardia and chest pain.  Now being treated for unresolved PNA.    While patient denies SI,  he also has had a 1:1 watching him the entire time in a room with a curtain. His mother's collateral that he will say the right thing when his money comes into his account at the beginning of the month and then return to  and self injury when he runs out of money is concerning. While he is entitled to buy what he wants to, he is demonstrating poor impulse control already by buying food at a hospital that he has been getting food served to him.  Therefore he warrants returning to [facility] as the least restrictive means where further observation, evaluation and treatment can be done safely.    Of note, he has NOT been on intuniv here as it is not on formulary. Should restart at 1mg when he goes to [facility]    Recommendations-  --> Continue 1:1 at bedside due to recent SI  -->Continue current standing medications as ordered on [facility]:   	Depakote 1500mg qhs,   	risperidone 4 mg BID,   	Remeron 30 mg qhs, klonopin 1 mg daily, Gabapentin 400 mg TID.  Of note, he has NOT been on intuniv here as it is not on formulary. Should restart at 1mg when he goes to [facility]    --> PRNs: Thorazine 50 mg PO/IM q6h, Ativan 2 mg PO/IM/IV q6h for severe agitation (monitor qtc < 500). Pt with documented allergy on [place] to Haldol and Zyprexa   Dispo: return to [facility] when medically stable

## 2023-07-05 NOTE — PROGRESS NOTE ADULT - SUBJECTIVE AND OBJECTIVE BOX
Patient is a 33y old  Male who presents with a chief complaint of tachycardia (04 Jul 2023 14:38)    SUBJECTIVE / OVERNIGHT EVENTS:    States he is fine, no CP, SOB, f/c/n/v  States he wants to go home not to [facility]  states he stabbed himself while sad  no other complaints, states got paid today, buying egg sandwiches on [employer]     MEDICATIONS  (STANDING):  atorvastatin 10 milliGRAM(s) Oral at bedtime  bacitracin   Ointment 1 Application(s) Topical daily  clonazePAM  Tablet 1 milliGRAM(s) Oral daily  divalproex DR 1500 milliGRAM(s) Oral at bedtime  enoxaparin Injectable 40 milliGRAM(s) SubCutaneous every 12 hours  ferrous    sulfate 325 milliGRAM(s) Oral at bedtime  gabapentin 400 milliGRAM(s) Oral two times a day  glucagon  Injectable 1 milliGRAM(s) IntraMuscular once  guanFACINE 1 milliGRAM(s) Oral at bedtime  insulin lispro (ADMELOG) corrective regimen sliding scale   SubCutaneous at bedtime  insulin lispro (ADMELOG) corrective regimen sliding scale   SubCutaneous three times a day before meals  levothyroxine 50 MICROGram(s) Oral daily  loratadine 10 milliGRAM(s) Oral daily  melatonin 3 milliGRAM(s) Oral at bedtime  mirtazapine 30 milliGRAM(s) Oral at bedtime  pantoprazole    Tablet 40 milliGRAM(s) Oral before breakfast  risperiDONE   Tablet 4 milliGRAM(s) Oral two times a day  senna 2 Tablet(s) Oral at bedtime  tamsulosin 0.4 milliGRAM(s) Oral at bedtime    MEDICATIONS  (PRN):  acetaminophen     Tablet .. 650 milliGRAM(s) Oral every 6 hours PRN Temp greater or equal to 38C (100.4F), Mild Pain (1 - 3)  chlorproMAZINE    Injectable 50 milliGRAM(s) IntraMuscular every 6 hours PRN agitation  chlorproMAZINE    Tablet 50 milliGRAM(s) Oral every 6 hours PRN Agitation  dextrose Oral Gel 15 Gram(s) Oral once PRN Blood Glucose LESS THAN 70 milliGRAM(s)/deciliter  guaiFENesin Oral Liquid (Sugar-Free) 100 milliGRAM(s) Oral every 6 hours PRN Cough  LORazepam     Tablet 2 milliGRAM(s) Oral every 6 hours PRN Agitation/Anxiety  LORazepam   Injectable 2 milliGRAM(s) IntraMuscular every 6 hours PRN Agitation  ondansetron Injectable 4 milliGRAM(s) IV Push every 8 hours PRN Nausea and/or Vomiting    T(C): 36.7 (07-05-23 @ 09:05), Max: 36.8 (07-05-23 @ 05:00)  HR: 92 (07-05-23 @ 09:05) (81 - 97)  BP: 136/80 (07-05-23 @ 05:00) (118/57 - 136/80)  RR: 20 (07-05-23 @ 09:05) (20 - 20)  SpO2: 95% (07-05-23 @ 09:05) (94% - 97%)    CAPILLARY BLOOD GLUCOSE  POCT Blood Glucose.: 288 mg/dL (05 Jul 2023 11:19)  POCT Blood Glucose.: 133 mg/dL (05 Jul 2023 07:49)  POCT Blood Glucose.: 219 mg/dL (04 Jul 2023 22:13)  POCT Blood Glucose.: 275 mg/dL (04 Jul 2023 16:26)    PHYSICAL EXAM:  GENERAL: NAD, obese, on RA, normal WOB  CHEST/LUNG: Clear to auscultation bilaterally; No wheeze  HEART: Regular rate and rhythm; No murmurs, rubs, or gallops  ABDOMEN: Soft, Nontender, Nondistended; Bowel sounds present  EXTREMITIES:   warm and well perfused, No clubbing, cyanosis, or edema  PSYCH: AAOx3  NEUROLOGY: non-focal  SKIN: small ulceration on anterior abdominal wall no s/s of infection, dressing c/d/i    LABS: no new lab    Microbiology: Culture Results:   No growth at 5 days (06-29 @ 06:50)  Culture Results:       Care Discussed with Consultants/Other Providers:   Patient is a 33y old  Male who presents with a chief complaint of tachycardia (04 Jul 2023 14:38)    SUBJECTIVE / OVERNIGHT EVENTS:    States he is fine, no CP, SOB, f/c/n/v  States he wants to go home not to [facility]  states he stabbed himself while sad  no other complaints, states got paid today, buying egg sandwiches on [employer]     MEDICATIONS  (STANDING):  atorvastatin 10 milliGRAM(s) Oral at bedtime  bacitracin   Ointment 1 Application(s) Topical daily  clonazePAM  Tablet 1 milliGRAM(s) Oral daily  divalproex DR 1500 milliGRAM(s) Oral at bedtime  enoxaparin Injectable 40 milliGRAM(s) SubCutaneous every 12 hours  ferrous    sulfate 325 milliGRAM(s) Oral at bedtime  gabapentin 400 milliGRAM(s) Oral two times a day  glucagon  Injectable 1 milliGRAM(s) IntraMuscular once  guanFACINE 1 milliGRAM(s) Oral at bedtime  insulin lispro (ADMELOG) corrective regimen sliding scale   SubCutaneous at bedtime  insulin lispro (ADMELOG) corrective regimen sliding scale   SubCutaneous three times a day before meals  levothyroxine 50 MICROGram(s) Oral daily  loratadine 10 milliGRAM(s) Oral daily  melatonin 3 milliGRAM(s) Oral at bedtime  mirtazapine 30 milliGRAM(s) Oral at bedtime  pantoprazole    Tablet 40 milliGRAM(s) Oral before breakfast  risperiDONE   Tablet 4 milliGRAM(s) Oral two times a day  senna 2 Tablet(s) Oral at bedtime  tamsulosin 0.4 milliGRAM(s) Oral at bedtime    MEDICATIONS  (PRN):  acetaminophen     Tablet .. 650 milliGRAM(s) Oral every 6 hours PRN Temp greater or equal to 38C (100.4F), Mild Pain (1 - 3)  chlorproMAZINE    Injectable 50 milliGRAM(s) IntraMuscular every 6 hours PRN agitation  chlorproMAZINE    Tablet 50 milliGRAM(s) Oral every 6 hours PRN Agitation  dextrose Oral Gel 15 Gram(s) Oral once PRN Blood Glucose LESS THAN 70 milliGRAM(s)/deciliter  guaiFENesin Oral Liquid (Sugar-Free) 100 milliGRAM(s) Oral every 6 hours PRN Cough  LORazepam     Tablet 2 milliGRAM(s) Oral every 6 hours PRN Agitation/Anxiety  LORazepam   Injectable 2 milliGRAM(s) IntraMuscular every 6 hours PRN Agitation  ondansetron Injectable 4 milliGRAM(s) IV Push every 8 hours PRN Nausea and/or Vomiting    T(C): 36.7 (07-05-23 @ 09:05), Max: 36.8 (07-05-23 @ 05:00)  HR: 92 (07-05-23 @ 09:05) (81 - 97)  BP: 136/80 (07-05-23 @ 05:00) (118/57 - 136/80)  RR: 20 (07-05-23 @ 09:05) (20 - 20)  SpO2: 95% (07-05-23 @ 09:05) (94% - 97%)    CAPILLARY BLOOD GLUCOSE  POCT Blood Glucose.: 288 mg/dL (05 Jul 2023 11:19)  POCT Blood Glucose.: 133 mg/dL (05 Jul 2023 07:49)  POCT Blood Glucose.: 219 mg/dL (04 Jul 2023 22:13)  POCT Blood Glucose.: 275 mg/dL (04 Jul 2023 16:26)    PHYSICAL EXAM:  GENERAL: NAD, obese, on RA, normal WOB  CHEST/LUNG: Clear to auscultation bilaterally; No wheeze  HEART: Regular rate and rhythm; No murmurs, rubs, or gallops  ABDOMEN: Soft, Nontender, Nondistended; Bowel sounds present  EXTREMITIES:   warm and well perfused, No clubbing, cyanosis, or edema  PSYCH: AAOx3  NEUROLOGY: non-focal  SKIN: small ulceration on anterior abdominal wall no s/s of infection, dressing c/d/i    LABS: no new lab    Microbiology: Culture Results:   No growth at 5 days (06-29 @ 06:50)  Culture Results:     Care Discussed with Consultants/Other Providers: Dr. Lopez psych

## 2023-07-05 NOTE — PROGRESS NOTE ADULT - PROBLEM SELECTOR PLAN 3
From [facility], at baseline at , mother concerned re: safety impulsivity   - c/w Klonopin 1 daily, Depakote 1500 qhs, gabapentin 400 BID, melatonin 5 qhs, Remeron 30 qhs, risperidone 4 mg BID  - for agitation Thorazine 50 mg PO/IM q6h, Ativan 2 mg PO/IM/IV q6h for severe agitation (monitor qtc < 500)  - QTc 452 on 7/4/23

## 2023-07-05 NOTE — PROGRESS NOTE ADULT - PROBLEM SELECTOR PLAN 6
- c/w DM diet and SINA  - resume metformin 1000 mg BID on dc  - pt advised to eat healthy and exercise to encourage weight loss

## 2023-07-05 NOTE — SWALLOW BEDSIDE ASSESSMENT ADULT - ADDITIONAL RECOMMENDATIONS
Reconsult this service when patient becomes medically optimized/ more accepting of PO trials.
This department to follow up as schedule permits to assess for diet tolerance. Medical team further advised to reconsult if there is a change in medical status and/or patient's tolerance of recommended PO diet.

## 2023-07-05 NOTE — CHART NOTE - NSCHARTNOTEFT_GEN_A_CORE
Briefly this is a 34 Y/O M with intellectual disability, obesity, asthma, autism, impulse control disorder and hyperlipidemia with admission for tachypnea & tachycardia 2/2 unresolved PNA now s/p abx course. CODE [facility]E called as pt was agitated and aggressive with staff with threatening behavior. Upon my arrival, patient was surrounded by security, yelling that he was promised he would leave for [facility] today however was frustrated that he was still here. Explained to patient that there were no [facility] beds, pt wanted to remain standing up; plan was set in place by primary team to give PRN order for agitation. Pt was escorted to his chair by the security team and safety maintained.

## 2023-07-05 NOTE — SWALLOW BEDSIDE ASSESSMENT ADULT - SWALLOW EVAL: DIAGNOSIS
Patient received sleeping in bed, arouable to multimodality cues (verbal, tactile and thermal via ice chip) patient did not follow basic directives however able to make basic wants/ needs known. Once aroused patient shouting "No" and refusing PO trials despite encouragement from clinician and PCA who was present at bedside. Oral/ Pharyngeal swallow could not be adequately assessed given patient refusal.
1- Functional Oral Stage for regular solids and thin liquids marked by adequate oral containment, adequate mastication, adequate anterior to posterior transfer, and adequate oral clearance. 2- Functional Pharyngeal Stage for regular solids and thin liquids marked by initiation of the pharyngeal swallow with hyolaryngeal excursion upon palpation without evidence of impaired airway protection.

## 2023-07-05 NOTE — SWALLOW BEDSIDE ASSESSMENT ADULT - ASR SWALLOW RECOMMEND DIAG
Objective testing not warranted at this time given clinical presentation/ patient refusal during clinical swallow assessment
Consider Cinesophagram to rule out if cough is dysphagia related at MD's discretion/VFSS/MBS

## 2023-07-05 NOTE — SWALLOW BEDSIDE ASSESSMENT ADULT - SWALLOW EVAL: RECOMMENDED FEEDING/EATING TECHNIQUES
Swallowing Guidelines: Seated Upright during Mealtimes, Slow Pacing, Chew Solids Well, Single/Small Sips for Thin Liquids, Allow for Swallow Prior to Next Presentation

## 2023-07-06 LAB
GLUCOSE BLDC GLUCOMTR-MCNC: 151 MG/DL — HIGH (ref 70–99)
GLUCOSE BLDC GLUCOMTR-MCNC: 236 MG/DL — HIGH (ref 70–99)
GLUCOSE BLDC GLUCOMTR-MCNC: 318 MG/DL — HIGH (ref 70–99)
GLUCOSE BLDC GLUCOMTR-MCNC: 330 MG/DL — HIGH (ref 70–99)

## 2023-07-06 PROCEDURE — 99232 SBSQ HOSP IP/OBS MODERATE 35: CPT

## 2023-07-06 PROCEDURE — 99231 SBSQ HOSP IP/OBS SF/LOW 25: CPT

## 2023-07-06 RX ORDER — CLONAZEPAM 1 MG
1 TABLET ORAL DAILY
Refills: 0 | Status: DISCONTINUED | OUTPATIENT
Start: 2023-07-06 | End: 2023-07-13

## 2023-07-06 RX ADMIN — Medication 1 MILLIGRAM(S): at 11:45

## 2023-07-06 RX ADMIN — SENNA PLUS 2 TABLET(S): 8.6 TABLET ORAL at 21:08

## 2023-07-06 RX ADMIN — TAMSULOSIN HYDROCHLORIDE 0.4 MILLIGRAM(S): 0.4 CAPSULE ORAL at 21:07

## 2023-07-06 RX ADMIN — Medication 325 MILLIGRAM(S): at 21:07

## 2023-07-06 RX ADMIN — Medication 1: at 11:56

## 2023-07-06 RX ADMIN — INSULIN GLARGINE 15 UNIT(S): 100 INJECTION, SOLUTION SUBCUTANEOUS at 23:12

## 2023-07-06 RX ADMIN — ENOXAPARIN SODIUM 40 MILLIGRAM(S): 100 INJECTION SUBCUTANEOUS at 17:07

## 2023-07-06 RX ADMIN — DIVALPROEX SODIUM 1500 MILLIGRAM(S): 500 TABLET, DELAYED RELEASE ORAL at 21:07

## 2023-07-06 RX ADMIN — GUANFACINE 1 MILLIGRAM(S): 3 TABLET, EXTENDED RELEASE ORAL at 21:08

## 2023-07-06 RX ADMIN — GABAPENTIN 400 MILLIGRAM(S): 400 CAPSULE ORAL at 17:07

## 2023-07-06 RX ADMIN — Medication 3 MILLIGRAM(S): at 21:07

## 2023-07-06 RX ADMIN — LORATADINE 10 MILLIGRAM(S): 10 TABLET ORAL at 11:44

## 2023-07-06 RX ADMIN — RISPERIDONE 4 MILLIGRAM(S): 4 TABLET ORAL at 17:07

## 2023-07-06 RX ADMIN — Medication 5 UNIT(S): at 17:15

## 2023-07-06 RX ADMIN — Medication 2: at 16:54

## 2023-07-06 RX ADMIN — Medication 1 APPLICATION(S): at 11:48

## 2023-07-06 RX ADMIN — Medication 2: at 23:12

## 2023-07-06 RX ADMIN — ATORVASTATIN CALCIUM 10 MILLIGRAM(S): 80 TABLET, FILM COATED ORAL at 21:07

## 2023-07-06 RX ADMIN — Medication 5 UNIT(S): at 11:57

## 2023-07-06 RX ADMIN — MIRTAZAPINE 30 MILLIGRAM(S): 45 TABLET, ORALLY DISINTEGRATING ORAL at 21:10

## 2023-07-06 NOTE — BH CONSULTATION LIAISON PROGRESS NOTE - NSBHASSESSMENTFT_PSY_ALL_CORE
34 yo M, single, disabled, non-caregiver, resides at a [facility], with psych h/o moderate ID, ASD, impulse control disorder, factitious disorder, HAVEN, mood disorders, PTSD and ADHD, multiple past psych admissions (last known [facility] 6/13/23-present), numerous ED visits for both medical/psych complaints, longstanding h/o SIB (head banging, cutting), but no known SA, longstanding h/o endorsing SI, h/o aggression toward staff at , PMH significant for asthma, DM, urinary retention, GERD, BPH, hypothyroidism, HLD, HTN, and b/l myopia,  who was admitted to [place] [address]  for self inflicted laceration to abdomen, endorsing CAH to hurt himself and staff at group home. Pt transferred to [facility] and admitted to medicine on 6/23/23 for lethargy, pneumonia. Patient transferred back to [facility] on 6/28 however immediately came back to [facility] for worsening cough, hypoxia, tachycardia and chest pain.  Now being treated for unresolved PNA.    While patient denies SI,  he also has had a 1:1 watching him the entire time in a room with a curtain. His mother's collateral that he will say the right thing when his money comes into his account at the beginning of the month and then return to  and self injury when he runs out of money is concerning. While he is entitled to buy what he wants to, he is demonstrating poor impulse control already by buying food at a hospital that he has been getting food served to him.  Therefore he warrants returning to [facility] as the least restrictive means where further observation, evaluation and treatment can be done safely.    Of note, he has NOT been on intuniv here as it is not on formulary. Should restart at 1mg when he goes to [facility]    7/6: alert, angry, irritable, refused to engage with writerMoises called last night d/t agitation-redirectable, no prn's, awaiting bed at [facility]    Recommendations-  --> Continue 1:1 at bedside due to recent SI  -->Continue current standing medications as ordered on [facility]:   	Depakote 1500mg qhs,   	risperidone 4 mg BID,   	Remeron 30 mg qhs, klonopin 1 mg daily, Gabapentin 400 mg TID.  Of note, he has NOT been on intuniv here as it is not on formulary. Should restart at 1mg when he goes to [facility]    --> PRNs: Thorazine 50 mg PO/IM q6h, Ativan 2 mg PO/IM/IV q6h for severe agitation (monitor qtc < 500). Pt with documented allergy on [place] to Haldol and Zyprexa   Dispo: return to [facility] when medically stable 32 yo M, single, disabled, non-caregiver, resides at a [facility], with psych h/o moderate ID, ASD, impulse control disorder, factitious disorder, HAVEN, mood disorders, PTSD and ADHD, multiple past psych admissions (last known [facility] 6/13/23-present), numerous ED visits for both medical/psych complaints, longstanding h/o SIB (head banging, cutting), but no known SA, longstanding h/o endorsing SI, h/o aggression toward staff at , PMH significant for asthma, DM, urinary retention, GERD, BPH, hypothyroidism, HLD, HTN, and b/l myopia,  who was admitted to [place] [address]  for self inflicted laceration to abdomen, endorsing CAH to hurt himself and staff at group home. Pt transferred to [facility] and admitted to medicine on 6/23/23 for lethargy, pneumonia. Patient transferred back to [facility] on 6/28 however immediately came back to [facility] for worsening cough, hypoxia, tachycardia and chest pain.  Now being treated for unresolved PNA.    While patient denies SI,  he also has had a 1:1 watching him the entire time in a room with a curtain. His mother's collateral that he will say the right thing when his money comes into his account at the beginning of the month and then return to  and self injury when he runs out of money is concerning. While he is entitled to buy what he wants to, he is demonstrating poor impulse control already by buying food at a hospital that he has been getting food served to him.  Therefore he warrants returning to [facility] as the least restrictive means where further observation, evaluation and treatment can be done safely.    Of note, he has NOT been on intuniv here as it is not on formulary. Should restart at 1mg when he goes to [facility]    7/6: alert, angry, irritable, refused to engage with writer, Moises Maradiaga called last night d/t agitation-redirectable, no prn's, awaiting bed at [facility]    Recommendations-  --> Continue 1:1 at bedside due to recent SI-low threshold for prn's, security  -->Continue current standing medications as ordered on [facility]:   	Depakote 1500mg qhs,   	risperidone 4 mg BID,   	Remeron 30 mg qhs, klonopin 1 mg daily, Gabapentin 400 mg TID.  Of note, he has NOT been on intuniv here as it is not on formulary. Should restart at 1mg when he goes to [facility]    --> PRNs: Thorazine 50 mg PO/IM q6h, Ativan 2 mg PO/IM/IV q6h for severe agitation (monitor qtc < 500). Pt with documented allergy on [place] to Haldol and Zyprexa   Dispo: return to [facility] when medically stable

## 2023-07-06 NOTE — PROGRESS NOTE ADULT - PROBLEM SELECTOR PLAN 6
- c/w DM diet and SINA  - added basal/bolus  - ordering outside food on [employer]   - resume metformin 1000 mg BID on dc  - pt advised to eat healthy and exercise to encourage weight loss

## 2023-07-06 NOTE — PROGRESS NOTE ADULT - PROBLEM SELECTOR PLAN 3
From [facility], at baseline at , mother concerned re: safety impulsivity   - c/w Klonopin 1 daily, Depakote 1500 qhs, gabapentin 400 BID, melatonin 5 qhs, Remeron 30 qhs, risperidone 4 mg BID  - for agitation Thorazine 50 mg PO/IM q6h, Ativan 2 mg PO/IM/IV q6h for severe agitation (monitor qtc < 500)  - QTc 452 on 7/4/23

## 2023-07-06 NOTE — PROGRESS NOTE ADULT - SUBJECTIVE AND OBJECTIVE BOX
Patient is a 33y old  Male who presents with a chief complaint of tachycardia (04 Jul 2023 14:38)    SUBJECTIVE / OVERNIGHT EVENTS:    No complaints, no CP/SOB/f/c/n/v    MEDICATIONS  (STANDING):  atorvastatin 10 milliGRAM(s) Oral at bedtime  bacitracin   Ointment 1 Application(s) Topical daily  divalproex DR 1500 milliGRAM(s) Oral at bedtime  enoxaparin Injectable 40 milliGRAM(s) SubCutaneous every 12 hours  ferrous    sulfate 325 milliGRAM(s) Oral at bedtime  gabapentin 400 milliGRAM(s) Oral two times a day  glucagon  Injectable 1 milliGRAM(s) IntraMuscular once  guanFACINE 1 milliGRAM(s) Oral at bedtime  insulin glargine Injectable (LANTUS) 15 Unit(s) SubCutaneous at bedtime  insulin lispro (ADMELOG) corrective regimen sliding scale   SubCutaneous three times a day before meals  insulin lispro (ADMELOG) corrective regimen sliding scale   SubCutaneous at bedtime  insulin lispro Injectable (ADMELOG) 5 Unit(s) SubCutaneous three times a day before meals  levothyroxine 50 MICROGram(s) Oral daily  loratadine 10 milliGRAM(s) Oral daily  melatonin 3 milliGRAM(s) Oral at bedtime  mirtazapine 30 milliGRAM(s) Oral at bedtime  pantoprazole    Tablet 40 milliGRAM(s) Oral before breakfast  risperiDONE   Tablet 4 milliGRAM(s) Oral two times a day  senna 2 Tablet(s) Oral at bedtime  tamsulosin 0.4 milliGRAM(s) Oral at bedtime    MEDICATIONS  (PRN):  acetaminophen     Tablet .. 650 milliGRAM(s) Oral every 6 hours PRN Temp greater or equal to 38C (100.4F), Mild Pain (1 - 3)  chlorproMAZINE    Injectable 50 milliGRAM(s) IntraMuscular every 6 hours PRN agitation  chlorproMAZINE    Tablet 50 milliGRAM(s) Oral every 6 hours PRN Agitation  dextrose Oral Gel 15 Gram(s) Oral once PRN Blood Glucose LESS THAN 70 milliGRAM(s)/deciliter  guaiFENesin Oral Liquid (Sugar-Free) 100 milliGRAM(s) Oral every 6 hours PRN Cough  LORazepam     Tablet 2 milliGRAM(s) Oral every 6 hours PRN Agitation/Anxiety  LORazepam   Injectable 2 milliGRAM(s) IntraMuscular every 6 hours PRN Agitation  ondansetron Injectable 4 milliGRAM(s) IV Push every 8 hours PRN Nausea and/or Vomiting    T(C): 37.2 (07-05-23 @ 22:04), Max: 37.2 (07-05-23 @ 18:27)  HR: 93 (07-05-23 @ 22:04) (93 - 99)  BP: 126/89 (07-05-23 @ 22:04) (114/63 - 126/89)  RR: 19 (07-05-23 @ 22:04) (18 - 19)  SpO2: 96% (07-05-23 @ 22:04) (96% - 96%)    CAPILLARY BLOOD GLUCOSE  POCT Blood Glucose.: 151 mg/dL (06 Jul 2023 11:55)  POCT Blood Glucose.: 274 mg/dL (05 Jul 2023 21:33)  POCT Blood Glucose.: 340 mg/dL (05 Jul 2023 16:33)    PHYSICAL EXAM:  GENERAL: NAD, obese, on RA, normal WOB  CHEST/LUNG: Clear to auscultation bilaterally; No wheeze  HEART: Regular rate and rhythm; No murmurs, rubs, or gallops  ABDOMEN: Soft, Nontender, Nondistended; Bowel sounds present  EXTREMITIES:   warm and well perfused, No clubbing, cyanosis, or edema  PSYCH: AAOx3  NEUROLOGY: non-focal  SKIN: small ulceration on anterior abdominal wall no s/s of infection, dressing c/d/i    LABS: no new labs    Care Discussed with Consultants/Other Providers:

## 2023-07-07 LAB
APPEARANCE UR: CLEAR — SIGNIFICANT CHANGE UP
BILIRUB UR-MCNC: NEGATIVE — SIGNIFICANT CHANGE UP
COLOR SPEC: YELLOW — SIGNIFICANT CHANGE UP
DIFF PNL FLD: NEGATIVE — SIGNIFICANT CHANGE UP
GLUCOSE BLDC GLUCOMTR-MCNC: 139 MG/DL — HIGH (ref 70–99)
GLUCOSE BLDC GLUCOMTR-MCNC: 188 MG/DL — HIGH (ref 70–99)
GLUCOSE BLDC GLUCOMTR-MCNC: 256 MG/DL — HIGH (ref 70–99)
GLUCOSE UR QL: 250 MG/DL
KETONES UR-MCNC: NEGATIVE MG/DL — SIGNIFICANT CHANGE UP
LEUKOCYTE ESTERASE UR-ACNC: NEGATIVE — SIGNIFICANT CHANGE UP
NITRITE UR-MCNC: NEGATIVE — SIGNIFICANT CHANGE UP
PH UR: 6 — SIGNIFICANT CHANGE UP (ref 5–8)
PROT UR-MCNC: NEGATIVE MG/DL — SIGNIFICANT CHANGE UP
SARS-COV-2 RNA SPEC QL NAA+PROBE: SIGNIFICANT CHANGE UP
SP GR SPEC: 1.01 — SIGNIFICANT CHANGE UP (ref 1–1.03)
UROBILINOGEN FLD QL: 0.2 MG/DL — SIGNIFICANT CHANGE UP (ref 0.2–1)

## 2023-07-07 PROCEDURE — 99232 SBSQ HOSP IP/OBS MODERATE 35: CPT

## 2023-07-07 PROCEDURE — 99231 SBSQ HOSP IP/OBS SF/LOW 25: CPT

## 2023-07-07 RX ORDER — RISPERIDONE 4 MG/1
2 TABLET ORAL
Refills: 0 | Status: DISCONTINUED | OUTPATIENT
Start: 2023-07-07 | End: 2023-07-11

## 2023-07-07 RX ORDER — NYSTATIN CREAM 100000 [USP'U]/G
1 CREAM TOPICAL
Refills: 0 | Status: DISCONTINUED | OUTPATIENT
Start: 2023-07-07 | End: 2023-07-13

## 2023-07-07 RX ORDER — RISPERIDONE 4 MG/1
4 TABLET ORAL
Refills: 0 | Status: DISCONTINUED | OUTPATIENT
Start: 2023-07-07 | End: 2023-07-13

## 2023-07-07 RX ADMIN — TAMSULOSIN HYDROCHLORIDE 0.4 MILLIGRAM(S): 0.4 CAPSULE ORAL at 21:49

## 2023-07-07 RX ADMIN — Medication 5 UNIT(S): at 11:42

## 2023-07-07 RX ADMIN — Medication 3 MILLIGRAM(S): at 21:49

## 2023-07-07 RX ADMIN — RISPERIDONE 4 MILLIGRAM(S): 4 TABLET ORAL at 21:49

## 2023-07-07 RX ADMIN — GABAPENTIN 400 MILLIGRAM(S): 400 CAPSULE ORAL at 17:22

## 2023-07-07 RX ADMIN — Medication 5 UNIT(S): at 17:21

## 2023-07-07 RX ADMIN — GUANFACINE 1 MILLIGRAM(S): 3 TABLET, EXTENDED RELEASE ORAL at 21:49

## 2023-07-07 RX ADMIN — Medication 1 MILLIGRAM(S): at 11:45

## 2023-07-07 RX ADMIN — DIVALPROEX SODIUM 1500 MILLIGRAM(S): 500 TABLET, DELAYED RELEASE ORAL at 21:50

## 2023-07-07 RX ADMIN — Medication 1 APPLICATION(S): at 11:45

## 2023-07-07 RX ADMIN — Medication 650 MILLIGRAM(S): at 19:01

## 2023-07-07 RX ADMIN — ATORVASTATIN CALCIUM 10 MILLIGRAM(S): 80 TABLET, FILM COATED ORAL at 21:49

## 2023-07-07 RX ADMIN — Medication 325 MILLIGRAM(S): at 21:49

## 2023-07-07 RX ADMIN — ENOXAPARIN SODIUM 40 MILLIGRAM(S): 100 INJECTION SUBCUTANEOUS at 17:22

## 2023-07-07 RX ADMIN — Medication 1: at 21:50

## 2023-07-07 RX ADMIN — INSULIN GLARGINE 15 UNIT(S): 100 INJECTION, SOLUTION SUBCUTANEOUS at 21:50

## 2023-07-07 RX ADMIN — SENNA PLUS 2 TABLET(S): 8.6 TABLET ORAL at 21:50

## 2023-07-07 RX ADMIN — LORATADINE 10 MILLIGRAM(S): 10 TABLET ORAL at 11:40

## 2023-07-07 RX ADMIN — MIRTAZAPINE 30 MILLIGRAM(S): 45 TABLET, ORALLY DISINTEGRATING ORAL at 21:49

## 2023-07-07 RX ADMIN — Medication 650 MILLIGRAM(S): at 19:49

## 2023-07-07 RX ADMIN — Medication 1: at 17:20

## 2023-07-07 NOTE — CHART NOTE - NSCHARTNOTEFT_GEN_A_CORE
Notified by patient's primary RN that patient reported that self harm. Patient assessed on the unit. Seen standing by nursing station at time of arrival. Patient reports that he used a plastic knife to stab himself and reopen the wound he had on his abdomen and b/l upper arms. Of note patient has been under 24H constant observation. He reports feeling frustrated and that his meds aren't working for him. Explained that we are awaiting open bed at [facility], which he acknowledged. Utensils since removed from patient's area and safety tray ordered. Will c/w CO order. When asked whether he had further thoughts of self harm, patient reports "not today." When asked to elaborate he states he hears voices at times that tell him to stab himself. When asked about homicidal ideation, patient reported "I didn't say that."    D/w Psych team. Will c/w CO and current med regimen for now. Notified by patient's primary RN that patient reported that self harm. Patient assessed on the unit. Seen standing by nursing station at time of arrival. Patient reports that he used a plastic knife to stab himself and reopen the wound he had on his abdomen and b/l upper arms. Patient's skin examined, and noted with superficial abrasions to abdomen (lateral to umbilicus) with 3 smaller abrasions to medial aspect of b/l upper arms. No active bleeding or deeper wound on inspection.     Of note patient has been under 24H constant observation. He reports feeling frustrated and that his meds aren't working for him. Explained that we are awaiting open bed at [facility], which he acknowledged. Utensils since removed from patient's area and safety tray ordered. Will c/w CO order. When asked whether he had further thoughts of self harm, patient reports "not today." When asked to elaborate he states he hears voices at times that tell him to stab himself. When asked about homicidal ideation, patient reported "I didn't say that."    D/w Psych team. Will c/w CO and current med regimen for now. Notified by patient's primary RN that patient reported that self harm. Patient assessed on the unit. Seen standing by nursing station at time of arrival. Patient reports that he used a plastic knife to stab himself and reopen the wound he had on his abdomen and b/l upper arms. Patient's skin examined, and noted with superficial abrasions to abdomen (lateral to umbilicus) with 3 smaller abrasions to medial aspect of b/l upper arms. No active bleeding or deeper wound on inspection.     Of note patient has been under 24H constant observation. He reports feeling frustrated and that his meds aren't working for him. Explained that we are awaiting open bed at [facility], which he acknowledged. Utensils since removed from patient's area and safety tray ordered. Will c/w CO order. When asked whether he had further thoughts of self harm, patient reports "not today." When asked to elaborate he states he hears voices at times that tell him to stab himself. When asked about homicidal ideation, patient reported "I didn't say that."    D/w Psych team- will redose Risperidone (2mg QAM, 4mg QPM). C/w CO.

## 2023-07-07 NOTE — BH CONSULTATION LIAISON PROGRESS NOTE - NSBHASSESSMENTFT_PSY_ALL_CORE
32 yo M, single, disabled, non-caregiver, resides at a [facility], with psych h/o moderate ID, ASD, impulse control disorder, factitious disorder, HAVEN, mood disorders, PTSD and ADHD, multiple past psych admissions (last known [facility] 6/13/23-present), numerous ED visits for both medical/psych complaints, longstanding h/o SIB (head banging, cutting), but no known SA, longstanding h/o endorsing SI, h/o aggression toward staff at , PMH significant for asthma, DM, urinary retention, GERD, BPH, hypothyroidism, HLD, HTN, and b/l myopia,  who was admitted to [place] [address]  for self inflicted laceration to abdomen, endorsing CAH to hurt himself and staff at group home. [facility] and [facility] to medicine on 6/23/23 for lethargy, pneumonia. Patient transferred back to [facility] on 6/28 however immediately came back to [facility] for worsening cough, hypoxia, tachycardia and chest pain.  Now being treated for unresolved PNA.    While patient denies SI,  he also has had a 1:1 watching him the entire time in a room with a curtain. His mother's collateral that he will say the right thing when his money comes into his account at the beginning of the month and then return to  and self injury when he runs out of money is concerning. While he is entitled to buy what he wants to, he is demonstrating poor impulse control already by buying food at a hospital that he has been getting food served to him.  Therefore he warrants returning to [facility] as the least restrictive means where further observation, evaluation and treatment can be done safely.    Of note, he has NOT been on intuniv here as it is not on formulary. Should restart at 1mg when he goes to [facility]    7/6: alert, angry, irritable, refused to engage with writer, Moises Maradiaga called last night d/t agitation-redirectable, no prn's, awaiting bed at [facility]  7/7: calm during interview, remains unpredictable as per staff. pending bed. Patient has been refusing am risperidone, accepting night doses     Recommendations-  --> Continue 1:1 at bedside due to recent SI-low threshold for prn's, security  -->Continue current standing medications as ordered on [facility]:   	Depakote 1500mg qhs,   	risperidone 4 mg BID, * patient has been refusing am doses **  	Remeron 30 mg qhs, klonopin 1 mg daily, Gabapentin 400 mg TID.  Of note, he has NOT been on intuniv here as it is not on formulary. Should restart at 1mg when he goes to [facility]    --> PRNs: Thorazine 50 mg PO/IM q6h, Ativan 2 mg PO/IM/IV q6h for severe agitation (monitor qtc < 500). Pt with documented allergy on [place] to Haldol and Zyprexa   Dispo: return to [facility] when medically stable 34 yo M, single, disabled, non-caregiver, resides at a [facility], with psych h/o moderate ID, ASD, impulse control disorder, factitious disorder, HAVEN, mood disorders, PTSD and ADHD, multiple past psych admissions (last known [facility] 6/13/23-present), numerous ED visits for both medical/psych complaints, longstanding h/o SIB (head banging, cutting), but no known SA, longstanding h/o endorsing SI, h/o aggression toward staff at , PMH significant for asthma, DM, urinary retention, GERD, BPH, hypothyroidism, HLD, HTN, and b/l myopia,  who was admitted to [facility]  for self inflicted laceration to abdomen, endorsing CAH to hurt himself and staff at group home. Pt transferred to [facility] and admitted to medicine on 6/23/23 for lethargy, pneumonia. Patient transferred back to [facility] on 6/28 however immediately came back to [facility] for worsening cough, hypoxia, tachycardia and chest pain.  Now being treated for unresolved PNA.    While patient denies SI,  he also has had a 1:1 watching him the entire time in a room with a curtain. His mother's collateral that he will say the right thing when his money comes into his account at the beginning of the month and then return to  and self injury when he runs out of money is concerning. While he is entitled to buy what he wants to, he is demonstrating poor impulse control already by buying food at a hospital that he has been getting food served to him.  Therefore he warrants returning to [facility] as the least restrictive means where further observation, evaluation and treatment can be done safely.    Of note, he has NOT been on intuniv here as it is not on formulary. Should restart at 1mg when he goes to [facility]    7/6: alert, angry, irritable, refused to engage with writer, Code Hiren called last night d/t agitation-redirectable, no prn's, awaiting bed at [facility]  7/7: calm during interview, remains unpredictable as per staff. pending bed. Patient has been refusing am risperidone, accepting night doses x 3 days     Recommendations-  --> Continue 1:1 at bedside due to recent SI-low threshold for prn's, security  ** called by team patient used knife on his old wound.  please order safety tray - no sharps or utensils near or around patient **  -->Continue current standing medications as ordered on [facility] ML4:   	Depakote 1500mg qhs,   	risperidone  * patient has been refusing am doses ** please order as 2mg qam and 4mg qhs ( Was 4mg BID) and monitor patient compliance   	Remeron 30 mg qhs, klonopin 1 mg daily, Gabapentin 400 mg TID.  	Tenex 1mg daily ( no intuniv available)     --> PRNs: Thorazine 50 mg PO/IM q6h, Ativan 2 mg PO/IM/IV q6h for severe agitation (monitor qtc < 500). Pt with documented allergy on [place] to Haldol and Zyprexa   Dispo: return to [facility] when medically stable

## 2023-07-07 NOTE — PROGRESS NOTE ADULT - PROBLEM SELECTOR PLAN 3
From [facility], at baseline at , mother concerned re: safety impulsivity   - c/w Klonopin 1 daily, Depakote 1500 qhs, gabapentin 400 BID, melatonin 5 qhs, Remeron 30 qhs, risperidone 4 mg BID; agitation Thorazine 50 mg PO/IM q6h, Ativan 2 mg PO/IM/IV q6h for severe agitation (monitor qtc < 500)  - QTc 452 on 7/4/23

## 2023-07-07 NOTE — PROGRESS NOTE ADULT - SUBJECTIVE AND OBJECTIVE BOX
Patient is a 33y old  Male who presents with a chief complaint of tachycardia (04 Jul 2023 14:38)    SUBJECTIVE / OVERNIGHT EVENTS:    No complaints, still sleeping when attempted to evaluate, did not want to get up  shakes his head to questions, denies pain, states he feeels fine, no CP, SOB, f/c/n/v    MEDICATIONS  (STANDING):  atorvastatin 10 milliGRAM(s) Oral at bedtime  bacitracin   Ointment 1 Application(s) Topical daily  clonazePAM  Tablet 1 milliGRAM(s) Oral daily  divalproex DR 1500 milliGRAM(s) Oral at bedtime  enoxaparin Injectable 40 milliGRAM(s) SubCutaneous every 12 hours  ferrous    sulfate 325 milliGRAM(s) Oral at bedtime  gabapentin 400 milliGRAM(s) Oral two times a day  glucagon  Injectable 1 milliGRAM(s) IntraMuscular once  guanFACINE 1 milliGRAM(s) Oral at bedtime  insulin glargine Injectable (LANTUS) 15 Unit(s) SubCutaneous at bedtime  insulin lispro (ADMELOG) corrective regimen sliding scale   SubCutaneous three times a day before meals  insulin lispro (ADMELOG) corrective regimen sliding scale   SubCutaneous at bedtime  insulin lispro Injectable (ADMELOG) 5 Unit(s) SubCutaneous three times a day before meals  levothyroxine 50 MICROGram(s) Oral daily  loratadine 10 milliGRAM(s) Oral daily  melatonin 3 milliGRAM(s) Oral at bedtime  mirtazapine 30 milliGRAM(s) Oral at bedtime  pantoprazole    Tablet 40 milliGRAM(s) Oral before breakfast  risperiDONE   Tablet 4 milliGRAM(s) Oral two times a day  senna 2 Tablet(s) Oral at bedtime  tamsulosin 0.4 milliGRAM(s) Oral at bedtime    MEDICATIONS  (PRN):  acetaminophen     Tablet .. 650 milliGRAM(s) Oral every 6 hours PRN Temp greater or equal to 38C (100.4F), Mild Pain (1 - 3)  chlorproMAZINE    Injectable 50 milliGRAM(s) IntraMuscular every 6 hours PRN agitation  chlorproMAZINE    Tablet 50 milliGRAM(s) Oral every 6 hours PRN Agitation  dextrose Oral Gel 15 Gram(s) Oral once PRN Blood Glucose LESS THAN 70 milliGRAM(s)/deciliter  guaiFENesin Oral Liquid (Sugar-Free) 100 milliGRAM(s) Oral every 6 hours PRN Cough  LORazepam     Tablet 2 milliGRAM(s) Oral every 6 hours PRN Agitation/Anxiety  LORazepam   Injectable 2 milliGRAM(s) IntraMuscular every 6 hours PRN Agitation  ondansetron Injectable 4 milliGRAM(s) IV Push every 8 hours PRN Nausea and/or Vomiting    T(C): 36.9 (07-06-23 @ 21:07), Max: 36.9 (07-06-23 @ 21:07)  HR: 91 (07-06-23 @ 21:07) (91 - 91)  BP: 124/89 (07-06-23 @ 21:07) (124/89 - 124/89)  RR: 17 (07-06-23 @ 21:07) (17 - 17)  SpO2: 99% (07-06-23 @ 21:07) (99% - 99%)    CAPILLARY BLOOD GLUCOSE  POCT Blood Glucose.: 139 mg/dL (07 Jul 2023 10:52)  POCT Blood Glucose.: 330 mg/dL (06 Jul 2023 23:11)  POCT Blood Glucose.: 318 mg/dL (06 Jul 2023 22:05)  POCT Blood Glucose.: 236 mg/dL (06 Jul 2023 16:21)    PHYSICAL EXAM:  GENERAL: NAD, obese, on RA, normal WOB  CHEST/LUNG: Clear to auscultation bilaterally; No wheeze  HEART: Regular rate and rhythm; No murmurs, rubs, or gallops  ABDOMEN: Soft, Nontender, Nondistended; Bowel sounds present  EXTREMITIES:   warm and well perfused, No clubbing, cyanosis, or edema  PSYCH: AAOx3  NEUROLOGY: non-focal  SKIN: small ulceration on anterior abdominal wall no s/s of infection, dressing c/d/i    LABS: no new labs    Care Discussed with Consultants/Other Providers:

## 2023-07-07 NOTE — PROGRESS NOTE ADULT - PROBLEM SELECTOR PLAN 6
- c/w DM diet and SINA  - added basal/bolus 15 unit LA and 5 units AC  - ordering outside food on [employer]   - resume metformin 1000 mg BID on dc  - pt advised to eat healthy and exercise to encourage weight loss

## 2023-07-07 NOTE — CHART NOTE - NSCHARTNOTEFT_GEN_A_CORE
Notified by RN that patient complains of testicular pain. Patient seen at bedside by provider. Patient in NAD. States he has pain in testicle and hurts to sit or walk around. Rates at 3/10. Patient also endorses incomplete urinary emptying and blood noted in urine. Per patient has history of testicular cancer w/ recent surgery and BPH. Mom Liz Bazan called for collateral w/ patients permission. Per mom -patient Dx w/ testicular cancer and had his left testicle removed within the last 6 months, unsure of when. Follows w/ [facility] Dr Nicolas, just doing monitoring, no chemo.     On exam - patient w/ mild redness and white exudate on scrotum and in BL groin. No swelling or redness to scrotum. Right testicle normal size and nontender. No drainage from urethra. No sores noted. No suprapubic tenderness.    Plan:  -UA  -Tylenol PRN  -Nystatin powder and encourage hygiene   -consider testicular US if not resolved Notified by RN that patient complains of testicular pain. Patient seen at bedside by provider. Patient in NAD. States he has pain in testicle and hurts to sit or walk around. Rates at 3/10. Patient also endorses incomplete urinary emptying and blood noted in urine. Per patient has history of testicular cancer w/ recent surgery and BPH. Akanksha Bazan [phone] called for collateral w/ patients permission. Per mom -patient Dx w/ testicular cancer and had his left testicle removed within the last 6 months, unsure of when. Follows w/ [facility] Dr Nicolas, just doing monitoring, no chemo.     On exam - patient w/ mild redness and white exudate on scrotum and in BL groin. No swelling or redness to scrotum. Right testicle normal size and nontender. No drainage from urethra. No sores noted. No suprapubic tenderness.    Plan:  -UA  -Tylenol PRN  -Nystatin powder and encourage hygiene   -consider testicular US if not resolved

## 2023-07-08 DIAGNOSIS — N50.819 TESTICULAR PAIN, UNSPECIFIED: ICD-10-CM

## 2023-07-08 LAB
GLUCOSE BLDC GLUCOMTR-MCNC: 137 MG/DL — HIGH (ref 70–99)
GLUCOSE BLDC GLUCOMTR-MCNC: 170 MG/DL — HIGH (ref 70–99)
GLUCOSE BLDC GLUCOMTR-MCNC: 202 MG/DL — HIGH (ref 70–99)
GLUCOSE BLDC GLUCOMTR-MCNC: 281 MG/DL — HIGH (ref 70–99)

## 2023-07-08 PROCEDURE — 93010 ELECTROCARDIOGRAM REPORT: CPT

## 2023-07-08 PROCEDURE — 99232 SBSQ HOSP IP/OBS MODERATE 35: CPT

## 2023-07-08 RX ADMIN — Medication 325 MILLIGRAM(S): at 21:34

## 2023-07-08 RX ADMIN — Medication 1 APPLICATION(S): at 13:04

## 2023-07-08 RX ADMIN — LORATADINE 10 MILLIGRAM(S): 10 TABLET ORAL at 13:04

## 2023-07-08 RX ADMIN — Medication 5 UNIT(S): at 17:31

## 2023-07-08 RX ADMIN — NYSTATIN CREAM 1 APPLICATION(S): 100000 CREAM TOPICAL at 17:38

## 2023-07-08 RX ADMIN — Medication 1 MILLIGRAM(S): at 13:04

## 2023-07-08 RX ADMIN — Medication 1: at 17:31

## 2023-07-08 RX ADMIN — ENOXAPARIN SODIUM 40 MILLIGRAM(S): 100 INJECTION SUBCUTANEOUS at 17:33

## 2023-07-08 RX ADMIN — MIRTAZAPINE 30 MILLIGRAM(S): 45 TABLET, ORALLY DISINTEGRATING ORAL at 21:35

## 2023-07-08 RX ADMIN — PANTOPRAZOLE SODIUM 40 MILLIGRAM(S): 20 TABLET, DELAYED RELEASE ORAL at 08:08

## 2023-07-08 RX ADMIN — DIVALPROEX SODIUM 1500 MILLIGRAM(S): 500 TABLET, DELAYED RELEASE ORAL at 21:35

## 2023-07-08 RX ADMIN — RISPERIDONE 2 MILLIGRAM(S): 4 TABLET ORAL at 08:40

## 2023-07-08 RX ADMIN — Medication 50 MICROGRAM(S): at 08:08

## 2023-07-08 RX ADMIN — NYSTATIN CREAM 1 APPLICATION(S): 100000 CREAM TOPICAL at 08:00

## 2023-07-08 RX ADMIN — INSULIN GLARGINE 15 UNIT(S): 100 INJECTION, SOLUTION SUBCUTANEOUS at 21:36

## 2023-07-08 RX ADMIN — Medication 3 MILLIGRAM(S): at 21:35

## 2023-07-08 RX ADMIN — SENNA PLUS 2 TABLET(S): 8.6 TABLET ORAL at 21:35

## 2023-07-08 RX ADMIN — Medication 2: at 08:30

## 2023-07-08 RX ADMIN — Medication 5 UNIT(S): at 13:06

## 2023-07-08 RX ADMIN — GABAPENTIN 400 MILLIGRAM(S): 400 CAPSULE ORAL at 17:38

## 2023-07-08 RX ADMIN — RISPERIDONE 4 MILLIGRAM(S): 4 TABLET ORAL at 21:34

## 2023-07-08 RX ADMIN — ATORVASTATIN CALCIUM 10 MILLIGRAM(S): 80 TABLET, FILM COATED ORAL at 21:35

## 2023-07-08 RX ADMIN — Medication 1: at 21:36

## 2023-07-08 RX ADMIN — GUANFACINE 1 MILLIGRAM(S): 3 TABLET, EXTENDED RELEASE ORAL at 21:35

## 2023-07-08 RX ADMIN — GABAPENTIN 400 MILLIGRAM(S): 400 CAPSULE ORAL at 08:08

## 2023-07-08 RX ADMIN — TAMSULOSIN HYDROCHLORIDE 0.4 MILLIGRAM(S): 0.4 CAPSULE ORAL at 21:35

## 2023-07-08 RX ADMIN — Medication 5 UNIT(S): at 08:35

## 2023-07-08 NOTE — PROGRESS NOTE ADULT - SUBJECTIVE AND OBJECTIVE BOX
Patient is a 33y old  Male who presents with a chief complaint of tachycardia (04 Jul 2023 14:38)    SUBJECTIVE / OVERNIGHT EVENTS:    No CP, SOB, f/c/n/v  states testicle has hurt since surgery, did not want to say    MEDICATIONS  (STANDING):  atorvastatin 10 milliGRAM(s) Oral at bedtime  bacitracin   Ointment 1 Application(s) Topical daily  clonazePAM  Tablet 1 milliGRAM(s) Oral daily  divalproex DR 1500 milliGRAM(s) Oral at bedtime  enoxaparin Injectable 40 milliGRAM(s) SubCutaneous every 12 hours  ferrous    sulfate 325 milliGRAM(s) Oral at bedtime  gabapentin 400 milliGRAM(s) Oral two times a day  glucagon  Injectable 1 milliGRAM(s) IntraMuscular once  guanFACINE 1 milliGRAM(s) Oral at bedtime  insulin glargine Injectable (LANTUS) 15 Unit(s) SubCutaneous at bedtime  insulin lispro (ADMELOG) corrective regimen sliding scale   SubCutaneous three times a day before meals  insulin lispro (ADMELOG) corrective regimen sliding scale   SubCutaneous at bedtime  insulin lispro Injectable (ADMELOG) 5 Unit(s) SubCutaneous three times a day before meals  levothyroxine 50 MICROGram(s) Oral daily  loratadine 10 milliGRAM(s) Oral daily  melatonin 3 milliGRAM(s) Oral at bedtime  mirtazapine 30 milliGRAM(s) Oral at bedtime  nystatin Powder 1 Application(s) Topical two times a day  pantoprazole    Tablet 40 milliGRAM(s) Oral before breakfast  risperiDONE   Tablet 2 milliGRAM(s) Oral <User Schedule>  risperiDONE   Tablet 4 milliGRAM(s) Oral <User Schedule>  senna 2 Tablet(s) Oral at bedtime  tamsulosin 0.4 milliGRAM(s) Oral at bedtime    MEDICATIONS  (PRN):  acetaminophen     Tablet .. 650 milliGRAM(s) Oral every 6 hours PRN Temp greater or equal to 38C (100.4F), Mild Pain (1 - 3)  chlorproMAZINE    Injectable 50 milliGRAM(s) IntraMuscular every 6 hours PRN agitation  chlorproMAZINE    Tablet 50 milliGRAM(s) Oral every 6 hours PRN Agitation  dextrose Oral Gel 15 Gram(s) Oral once PRN Blood Glucose LESS THAN 70 milliGRAM(s)/deciliter  guaiFENesin Oral Liquid (Sugar-Free) 100 milliGRAM(s) Oral every 6 hours PRN Cough  LORazepam     Tablet 2 milliGRAM(s) Oral every 6 hours PRN Agitation/Anxiety  LORazepam   Injectable 2 milliGRAM(s) IntraMuscular every 6 hours PRN Agitation  ondansetron Injectable 4 milliGRAM(s) IV Push every 8 hours PRN Nausea and/or Vomiting    T(C): 36.1 (07-08-23 @ 09:30), Max: 36.6 (07-07-23 @ 17:24)  HR: 88 (07-08-23 @ 09:30) (80 - 98)  BP: 105/60 (07-08-23 @ 09:30) (105/60 - 143/92)  RR: 18 (07-08-23 @ 09:30) (17 - 18)  SpO2: 95% (07-08-23 @ 09:30) (95% - 100%)    CAPILLARY BLOOD GLUCOSE  POCT Blood Glucose.: 137 mg/dL (08 Jul 2023 12:41)  POCT Blood Glucose.: 202 mg/dL (08 Jul 2023 08:26)  POCT Blood Glucose.: 256 mg/dL (07 Jul 2023 21:43)  POCT Blood Glucose.: 188 mg/dL (07 Jul 2023 16:24)    PHYSICAL EXAM:  GENERAL: NAD, obese, on RA, normal WOB  CHEST/LUNG: Clear to auscultation bilaterally; No wheeze  HEART: Regular rate and rhythm; No murmurs, rubs, or gallops  ABDOMEN: Soft, Nontender, Nondistended; Bowel sounds present  EXTREMITIES:   warm and well perfused, No clubbing, cyanosis, or edema  PSYCH: AAOx3  NEUROLOGY: non-focal  SKIN: small ulceration on anterior abdominal wall no s/s of infection, dressing c/d/i    LABS: no new labs      Care Discussed with Consultants/Other Providers:

## 2023-07-08 NOTE — PROGRESS NOTE ADULT - PROBLEM SELECTOR PLAN 9
Lovenox ppx  no PT needs  dc [facility], signed out to [facility] 7/5, no beds again today  COVID ND on 7/7

## 2023-07-08 NOTE — PROGRESS NOTE ADULT - PROBLEM SELECTOR PLAN 3
Likely from PNA   - HsT 7, ekg non ischemic  - CTA negative for PE  - no recurrence   - Tylenol or NSAIDS prn   - repeat EKG prn new pain

## 2023-07-08 NOTE — PROGRESS NOTE ADULT - PROBLEM SELECTOR PLAN 1
Seems frictional with poor hygiene with skin superficial rubbing/breakdown, also states pain has been since surgery; evaluated with chaperone ANALI Pérez as did not want 1:1 present  - shower  - apply barrier cream and nystatin  - check US given prior testicular cancer

## 2023-07-09 LAB
ANION GAP SERPL CALC-SCNC: 11 MMOL/L — SIGNIFICANT CHANGE UP (ref 7–14)
BASOPHILS # BLD AUTO: 0.05 K/UL — SIGNIFICANT CHANGE UP (ref 0–0.2)
BASOPHILS NFR BLD AUTO: 0.7 % — SIGNIFICANT CHANGE UP (ref 0–2)
BUN SERPL-MCNC: 11 MG/DL — SIGNIFICANT CHANGE UP (ref 7–23)
CALCIUM SERPL-MCNC: 9.1 MG/DL — SIGNIFICANT CHANGE UP (ref 8.4–10.5)
CHLORIDE SERPL-SCNC: 101 MMOL/L — SIGNIFICANT CHANGE UP (ref 98–107)
CO2 SERPL-SCNC: 24 MMOL/L — SIGNIFICANT CHANGE UP (ref 22–31)
CREAT SERPL-MCNC: 0.74 MG/DL — SIGNIFICANT CHANGE UP (ref 0.5–1.3)
EGFR: 122 ML/MIN/1.73M2 — SIGNIFICANT CHANGE UP
EOSINOPHIL # BLD AUTO: 0.45 K/UL — SIGNIFICANT CHANGE UP (ref 0–0.5)
EOSINOPHIL NFR BLD AUTO: 6.6 % — HIGH (ref 0–6)
GLUCOSE BLDC GLUCOMTR-MCNC: 158 MG/DL — HIGH (ref 70–99)
GLUCOSE BLDC GLUCOMTR-MCNC: 203 MG/DL — HIGH (ref 70–99)
GLUCOSE BLDC GLUCOMTR-MCNC: 236 MG/DL — HIGH (ref 70–99)
GLUCOSE BLDC GLUCOMTR-MCNC: 243 MG/DL — HIGH (ref 70–99)
GLUCOSE SERPL-MCNC: 154 MG/DL — HIGH (ref 70–99)
HCT VFR BLD CALC: 39.7 % — SIGNIFICANT CHANGE UP (ref 39–50)
HGB BLD-MCNC: 12.5 G/DL — LOW (ref 13–17)
IANC: 2.84 K/UL — SIGNIFICANT CHANGE UP (ref 1.8–7.4)
IMM GRANULOCYTES NFR BLD AUTO: 1.2 % — HIGH (ref 0–0.9)
LYMPHOCYTES # BLD AUTO: 2.8 K/UL — SIGNIFICANT CHANGE UP (ref 1–3.3)
LYMPHOCYTES # BLD AUTO: 40.8 % — SIGNIFICANT CHANGE UP (ref 13–44)
MAGNESIUM SERPL-MCNC: 1.8 MG/DL — SIGNIFICANT CHANGE UP (ref 1.6–2.6)
MCHC RBC-ENTMCNC: 25.9 PG — LOW (ref 27–34)
MCHC RBC-ENTMCNC: 31.5 GM/DL — LOW (ref 32–36)
MCV RBC AUTO: 82.2 FL — SIGNIFICANT CHANGE UP (ref 80–100)
MONOCYTES # BLD AUTO: 0.65 K/UL — SIGNIFICANT CHANGE UP (ref 0–0.9)
MONOCYTES NFR BLD AUTO: 9.5 % — SIGNIFICANT CHANGE UP (ref 2–14)
NEUTROPHILS # BLD AUTO: 2.84 K/UL — SIGNIFICANT CHANGE UP (ref 1.8–7.4)
NEUTROPHILS NFR BLD AUTO: 41.2 % — LOW (ref 43–77)
NRBC # BLD: 0 /100 WBCS — SIGNIFICANT CHANGE UP (ref 0–0)
NRBC # FLD: 0 K/UL — SIGNIFICANT CHANGE UP (ref 0–0)
PHOSPHATE SERPL-MCNC: 4.1 MG/DL — SIGNIFICANT CHANGE UP (ref 2.5–4.5)
PLATELET # BLD AUTO: 235 K/UL — SIGNIFICANT CHANGE UP (ref 150–400)
POTASSIUM SERPL-MCNC: 4.3 MMOL/L — SIGNIFICANT CHANGE UP (ref 3.5–5.3)
POTASSIUM SERPL-SCNC: 4.3 MMOL/L — SIGNIFICANT CHANGE UP (ref 3.5–5.3)
RBC # BLD: 4.83 M/UL — SIGNIFICANT CHANGE UP (ref 4.2–5.8)
RBC # FLD: 13.8 % — SIGNIFICANT CHANGE UP (ref 10.3–14.5)
SODIUM SERPL-SCNC: 136 MMOL/L — SIGNIFICANT CHANGE UP (ref 135–145)
WBC # BLD: 6.87 K/UL — SIGNIFICANT CHANGE UP (ref 3.8–10.5)
WBC # FLD AUTO: 6.87 K/UL — SIGNIFICANT CHANGE UP (ref 3.8–10.5)

## 2023-07-09 PROCEDURE — 76870 US EXAM SCROTUM: CPT | Mod: 26

## 2023-07-09 PROCEDURE — 99232 SBSQ HOSP IP/OBS MODERATE 35: CPT

## 2023-07-09 RX ADMIN — Medication 2: at 07:59

## 2023-07-09 RX ADMIN — GABAPENTIN 400 MILLIGRAM(S): 400 CAPSULE ORAL at 17:32

## 2023-07-09 RX ADMIN — PANTOPRAZOLE SODIUM 40 MILLIGRAM(S): 20 TABLET, DELAYED RELEASE ORAL at 06:20

## 2023-07-09 RX ADMIN — Medication 5 UNIT(S): at 12:15

## 2023-07-09 RX ADMIN — DIVALPROEX SODIUM 1500 MILLIGRAM(S): 500 TABLET, DELAYED RELEASE ORAL at 21:07

## 2023-07-09 RX ADMIN — Medication 325 MILLIGRAM(S): at 21:05

## 2023-07-09 RX ADMIN — INSULIN GLARGINE 15 UNIT(S): 100 INJECTION, SOLUTION SUBCUTANEOUS at 21:07

## 2023-07-09 RX ADMIN — NYSTATIN CREAM 1 APPLICATION(S): 100000 CREAM TOPICAL at 06:20

## 2023-07-09 RX ADMIN — Medication 1 MILLIGRAM(S): at 12:19

## 2023-07-09 RX ADMIN — Medication 1 APPLICATION(S): at 12:14

## 2023-07-09 RX ADMIN — TAMSULOSIN HYDROCHLORIDE 0.4 MILLIGRAM(S): 0.4 CAPSULE ORAL at 21:05

## 2023-07-09 RX ADMIN — RISPERIDONE 2 MILLIGRAM(S): 4 TABLET ORAL at 08:03

## 2023-07-09 RX ADMIN — ATORVASTATIN CALCIUM 10 MILLIGRAM(S): 80 TABLET, FILM COATED ORAL at 21:05

## 2023-07-09 RX ADMIN — GABAPENTIN 400 MILLIGRAM(S): 400 CAPSULE ORAL at 06:20

## 2023-07-09 RX ADMIN — Medication 650 MILLIGRAM(S): at 15:29

## 2023-07-09 RX ADMIN — GUANFACINE 1 MILLIGRAM(S): 3 TABLET, EXTENDED RELEASE ORAL at 21:06

## 2023-07-09 RX ADMIN — Medication 650 MILLIGRAM(S): at 14:59

## 2023-07-09 RX ADMIN — LORATADINE 10 MILLIGRAM(S): 10 TABLET ORAL at 12:14

## 2023-07-09 RX ADMIN — SENNA PLUS 2 TABLET(S): 8.6 TABLET ORAL at 21:06

## 2023-07-09 RX ADMIN — RISPERIDONE 4 MILLIGRAM(S): 4 TABLET ORAL at 19:12

## 2023-07-09 RX ADMIN — Medication 2: at 16:29

## 2023-07-09 RX ADMIN — Medication 1: at 12:14

## 2023-07-09 RX ADMIN — Medication 5 UNIT(S): at 08:00

## 2023-07-09 RX ADMIN — Medication 3 MILLIGRAM(S): at 21:06

## 2023-07-09 RX ADMIN — Medication 50 MICROGRAM(S): at 06:20

## 2023-07-09 RX ADMIN — ENOXAPARIN SODIUM 40 MILLIGRAM(S): 100 INJECTION SUBCUTANEOUS at 17:32

## 2023-07-09 RX ADMIN — Medication 5 UNIT(S): at 16:29

## 2023-07-09 RX ADMIN — MIRTAZAPINE 30 MILLIGRAM(S): 45 TABLET, ORALLY DISINTEGRATING ORAL at 21:05

## 2023-07-09 RX ADMIN — NYSTATIN CREAM 1 APPLICATION(S): 100000 CREAM TOPICAL at 17:32

## 2023-07-09 NOTE — PROGRESS NOTE ADULT - SUBJECTIVE AND OBJECTIVE BOX
Patient is a 33y old  Male who presents with a chief complaint of tachycardia (04 Jul 2023 14:38)    SUBJECTIVE / OVERNIGHT EVENTS:    no CP, SOB, f/c/n/v, reports still has pain     MEDICATIONS  (STANDING):  atorvastatin 10 milliGRAM(s) Oral at bedtime  bacitracin   Ointment 1 Application(s) Topical daily  clonazePAM  Tablet 1 milliGRAM(s) Oral daily  divalproex DR 1500 milliGRAM(s) Oral at bedtime  enoxaparin Injectable 40 milliGRAM(s) SubCutaneous every 12 hours  ferrous    sulfate 325 milliGRAM(s) Oral at bedtime  gabapentin 400 milliGRAM(s) Oral two times a day  glucagon  Injectable 1 milliGRAM(s) IntraMuscular once  guanFACINE 1 milliGRAM(s) Oral at bedtime  insulin glargine Injectable (LANTUS) 15 Unit(s) SubCutaneous at bedtime  insulin lispro (ADMELOG) corrective regimen sliding scale   SubCutaneous three times a day before meals  insulin lispro (ADMELOG) corrective regimen sliding scale   SubCutaneous at bedtime  insulin lispro Injectable (ADMELOG) 5 Unit(s) SubCutaneous three times a day before meals  levothyroxine 50 MICROGram(s) Oral daily  loratadine 10 milliGRAM(s) Oral daily  melatonin 3 milliGRAM(s) Oral at bedtime  mirtazapine 30 milliGRAM(s) Oral at bedtime  nystatin Powder 1 Application(s) Topical two times a day  pantoprazole    Tablet 40 milliGRAM(s) Oral before breakfast  risperiDONE   Tablet 2 milliGRAM(s) Oral <User Schedule>  risperiDONE   Tablet 4 milliGRAM(s) Oral <User Schedule>  senna 2 Tablet(s) Oral at bedtime  tamsulosin 0.4 milliGRAM(s) Oral at bedtime    MEDICATIONS  (PRN):  acetaminophen     Tablet .. 650 milliGRAM(s) Oral every 6 hours PRN Temp greater or equal to 38C (100.4F), Mild Pain (1 - 3)  chlorproMAZINE    Injectable 50 milliGRAM(s) IntraMuscular every 6 hours PRN agitation  chlorproMAZINE    Tablet 50 milliGRAM(s) Oral every 6 hours PRN Agitation  dextrose Oral Gel 15 Gram(s) Oral once PRN Blood Glucose LESS THAN 70 milliGRAM(s)/deciliter  guaiFENesin Oral Liquid (Sugar-Free) 100 milliGRAM(s) Oral every 6 hours PRN Cough  LORazepam     Tablet 2 milliGRAM(s) Oral every 6 hours PRN Agitation/Anxiety  LORazepam   Injectable 2 milliGRAM(s) IntraMuscular every 6 hours PRN Agitation  ondansetron Injectable 4 milliGRAM(s) IV Push every 8 hours PRN Nausea and/or Vomiting    T(C): 37.3 (07-09-23 @ 11:42), Max: 37.3 (07-09-23 @ 11:42)  HR: 79 (07-09-23 @ 11:42) (71 - 80)  BP: 121/80 (07-09-23 @ 11:42) (107/65 - 132/69)  RR: 18 (07-09-23 @ 11:42) (18 - 19)  SpO2: 62% (07-09-23 @ 11:42) (62% - 96%)    CAPILLARY BLOOD GLUCOSE  POCT Blood Glucose.: 158 mg/dL (09 Jul 2023 11:28)  POCT Blood Glucose.: 243 mg/dL (09 Jul 2023 07:36)  POCT Blood Glucose.: 281 mg/dL (08 Jul 2023 21:28)  POCT Blood Glucose.: 170 mg/dL (08 Jul 2023 16:52)  POCT Blood Glucose.: 137 mg/dL (08 Jul 2023 12:41)    PHYSICAL EXAM:  GENERAL: NAD, obese, on RA, normal WOB  CHEST/LUNG: Clear to auscultation bilaterally; No wheeze  HEART: Regular rate and rhythm; No murmurs, rubs, or gallops  ABDOMEN: Soft, Nontender, Nondistended; Bowel sounds present  EXTREMITIES:   warm and well perfused, No clubbing, cyanosis, or edema  PSYCH: AAOx3  NEUROLOGY: non-focal  SKIN: small ulceration on anterior abdominal wall no s/s of infection, dressing c/d/i    LABS:                        12.5   6.87  )-----------( 235      ( 09 Jul 2023 06:30 )             39.7     07-09    136  |  101  |  11  ----------------------------<  154<H>  4.3   |  24  |  0.74    Ca    9.1      09 Jul 2023 06:30  Phos  4.1     07-09  Mg     1.80     07-09    Urinalysis Basic - ( 09 Jul 2023 06:30 )  Color: x / Appearance: x / SG: x / pH: x  Gluc: 154 mg/dL / Ketone: x  / Bili: x / Urobili: x   Blood: x / Protein: x / Nitrite: x   Leuk Esterase: x / RBC: x / WBC x   Sq Epi: x / Non Sq Epi: x / Bacteria: x    Care Discussed with Consultants/Other Providers:

## 2023-07-09 NOTE — PROGRESS NOTE ADULT - PROBLEM SELECTOR PLAN 1
Seems frictional with poor hygiene with skin superficial rubbing/breakdown causing chaffing, also states pain has been since surgery; evaluated with chaperone ANALI Pérez as did not want 1:1 present  - shower  - apply barrier cream and nystatin  - check US given prior testicular cancer

## 2023-07-10 LAB
ANION GAP SERPL CALC-SCNC: 15 MMOL/L — HIGH (ref 7–14)
BUN SERPL-MCNC: 11 MG/DL — SIGNIFICANT CHANGE UP (ref 7–23)
CALCIUM SERPL-MCNC: 9.6 MG/DL — SIGNIFICANT CHANGE UP (ref 8.4–10.5)
CHLORIDE SERPL-SCNC: 99 MMOL/L — SIGNIFICANT CHANGE UP (ref 98–107)
CO2 SERPL-SCNC: 23 MMOL/L — SIGNIFICANT CHANGE UP (ref 22–31)
CREAT SERPL-MCNC: 0.72 MG/DL — SIGNIFICANT CHANGE UP (ref 0.5–1.3)
EGFR: 123 ML/MIN/1.73M2 — SIGNIFICANT CHANGE UP
GLUCOSE BLDC GLUCOMTR-MCNC: 127 MG/DL — HIGH (ref 70–99)
GLUCOSE BLDC GLUCOMTR-MCNC: 151 MG/DL — HIGH (ref 70–99)
GLUCOSE BLDC GLUCOMTR-MCNC: 158 MG/DL — HIGH (ref 70–99)
GLUCOSE BLDC GLUCOMTR-MCNC: 214 MG/DL — HIGH (ref 70–99)
GLUCOSE SERPL-MCNC: 142 MG/DL — HIGH (ref 70–99)
HCT VFR BLD CALC: 43.7 % — SIGNIFICANT CHANGE UP (ref 39–50)
HGB BLD-MCNC: 13.7 G/DL — SIGNIFICANT CHANGE UP (ref 13–17)
MAGNESIUM SERPL-MCNC: 1.7 MG/DL — SIGNIFICANT CHANGE UP (ref 1.6–2.6)
MCHC RBC-ENTMCNC: 25.8 PG — LOW (ref 27–34)
MCHC RBC-ENTMCNC: 31.4 GM/DL — LOW (ref 32–36)
MCV RBC AUTO: 82.1 FL — SIGNIFICANT CHANGE UP (ref 80–100)
NRBC # BLD: 0 /100 WBCS — SIGNIFICANT CHANGE UP (ref 0–0)
NRBC # FLD: 0 K/UL — SIGNIFICANT CHANGE UP (ref 0–0)
PHOSPHATE SERPL-MCNC: 4.8 MG/DL — HIGH (ref 2.5–4.5)
PLATELET # BLD AUTO: 260 K/UL — SIGNIFICANT CHANGE UP (ref 150–400)
POTASSIUM SERPL-MCNC: 4.2 MMOL/L — SIGNIFICANT CHANGE UP (ref 3.5–5.3)
POTASSIUM SERPL-SCNC: 4.2 MMOL/L — SIGNIFICANT CHANGE UP (ref 3.5–5.3)
RBC # BLD: 5.32 M/UL — SIGNIFICANT CHANGE UP (ref 4.2–5.8)
RBC # FLD: 13.7 % — SIGNIFICANT CHANGE UP (ref 10.3–14.5)
SARS-COV-2 RNA SPEC QL NAA+PROBE: SIGNIFICANT CHANGE UP
SODIUM SERPL-SCNC: 137 MMOL/L — SIGNIFICANT CHANGE UP (ref 135–145)
WBC # BLD: 6.6 K/UL — SIGNIFICANT CHANGE UP (ref 3.8–10.5)
WBC # FLD AUTO: 6.6 K/UL — SIGNIFICANT CHANGE UP (ref 3.8–10.5)

## 2023-07-10 PROCEDURE — 99233 SBSQ HOSP IP/OBS HIGH 50: CPT

## 2023-07-10 PROCEDURE — 99231 SBSQ HOSP IP/OBS SF/LOW 25: CPT

## 2023-07-10 RX ORDER — ACETAMINOPHEN 500 MG
650 TABLET ORAL ONCE
Refills: 0 | Status: COMPLETED | OUTPATIENT
Start: 2023-07-10 | End: 2023-07-10

## 2023-07-10 RX ORDER — IBUPROFEN 200 MG
400 TABLET ORAL ONCE
Refills: 0 | Status: COMPLETED | OUTPATIENT
Start: 2023-07-10 | End: 2023-07-10

## 2023-07-10 RX ADMIN — NYSTATIN CREAM 1 APPLICATION(S): 100000 CREAM TOPICAL at 17:09

## 2023-07-10 RX ADMIN — DIVALPROEX SODIUM 1500 MILLIGRAM(S): 500 TABLET, DELAYED RELEASE ORAL at 21:24

## 2023-07-10 RX ADMIN — Medication 5 UNIT(S): at 17:08

## 2023-07-10 RX ADMIN — Medication 50 MICROGRAM(S): at 05:40

## 2023-07-10 RX ADMIN — RISPERIDONE 2 MILLIGRAM(S): 4 TABLET ORAL at 08:11

## 2023-07-10 RX ADMIN — MIRTAZAPINE 30 MILLIGRAM(S): 45 TABLET, ORALLY DISINTEGRATING ORAL at 21:25

## 2023-07-10 RX ADMIN — LORATADINE 10 MILLIGRAM(S): 10 TABLET ORAL at 11:54

## 2023-07-10 RX ADMIN — Medication 1 APPLICATION(S): at 11:54

## 2023-07-10 RX ADMIN — GUANFACINE 1 MILLIGRAM(S): 3 TABLET, EXTENDED RELEASE ORAL at 21:26

## 2023-07-10 RX ADMIN — GABAPENTIN 400 MILLIGRAM(S): 400 CAPSULE ORAL at 17:11

## 2023-07-10 RX ADMIN — INSULIN GLARGINE 15 UNIT(S): 100 INJECTION, SOLUTION SUBCUTANEOUS at 21:32

## 2023-07-10 RX ADMIN — Medication 400 MILLIGRAM(S): at 17:46

## 2023-07-10 RX ADMIN — ATORVASTATIN CALCIUM 10 MILLIGRAM(S): 80 TABLET, FILM COATED ORAL at 21:25

## 2023-07-10 RX ADMIN — SENNA PLUS 2 TABLET(S): 8.6 TABLET ORAL at 21:24

## 2023-07-10 RX ADMIN — Medication 325 MILLIGRAM(S): at 21:25

## 2023-07-10 RX ADMIN — NYSTATIN CREAM 1 APPLICATION(S): 100000 CREAM TOPICAL at 05:44

## 2023-07-10 RX ADMIN — ENOXAPARIN SODIUM 40 MILLIGRAM(S): 100 INJECTION SUBCUTANEOUS at 17:09

## 2023-07-10 RX ADMIN — Medication 5 UNIT(S): at 08:10

## 2023-07-10 RX ADMIN — GABAPENTIN 400 MILLIGRAM(S): 400 CAPSULE ORAL at 05:41

## 2023-07-10 RX ADMIN — Medication 400 MILLIGRAM(S): at 17:10

## 2023-07-10 RX ADMIN — TAMSULOSIN HYDROCHLORIDE 0.4 MILLIGRAM(S): 0.4 CAPSULE ORAL at 21:24

## 2023-07-10 RX ADMIN — Medication 650 MILLIGRAM(S): at 18:54

## 2023-07-10 RX ADMIN — Medication 5 UNIT(S): at 11:57

## 2023-07-10 RX ADMIN — Medication 1: at 11:57

## 2023-07-10 RX ADMIN — Medication 1 MILLIGRAM(S): at 11:54

## 2023-07-10 RX ADMIN — PANTOPRAZOLE SODIUM 40 MILLIGRAM(S): 20 TABLET, DELAYED RELEASE ORAL at 05:40

## 2023-07-10 RX ADMIN — Medication 3 MILLIGRAM(S): at 21:24

## 2023-07-10 RX ADMIN — Medication 1: at 17:07

## 2023-07-10 RX ADMIN — Medication 650 MILLIGRAM(S): at 18:35

## 2023-07-10 RX ADMIN — RISPERIDONE 4 MILLIGRAM(S): 4 TABLET ORAL at 19:51

## 2023-07-10 NOTE — BH CONSULTATION LIAISON PROGRESS NOTE - NSBHASSESSMENTFT_PSY_ALL_CORE
32 yo M, single, disabled, non-caregiver, resides at a [facility], with psych h/o moderate ID, ASD, impulse control disorder, factitious disorder, HAVEN, mood disorders, PTSD and ADHD, multiple past psych admissions (last known [facility] 6/13/23-present), numerous ED visits for both medical/psych complaints, longstanding h/o SIB (head banging, cutting), but no known SA, longstanding h/o endorsing SI, h/o aggression toward staff at , [facility] significant for asthma, DM, urinary retention, GERD, BPH, hypothyroidism, HLD, HTN, and b/l myopia,  who was admitted to [facility] ML[facility]  for self inflicted laceration to abdomen, endorsing CAH to hurt himself and staff at group home. Pt transferred to [facility] and admitted to medicine on 6/23/23 for lethargy, pneumonia. Patient transferred back to [facility] on 6/28 however immediately came back to [facility] for worsening cough, hypoxia, tachycardia and chest pain.  Now being treated for unresolved PNA.    While patient denies SI,  he also has had a 1:1 watching him the entire time in a room with a curtain. His mother's collateral that he will say the right thing when his money comes into his account at the beginning of the month and then return to  and self injury when he runs out of money is concerning. While he is entitled to buy what he wants to, he is demonstrating poor impulse control already by buying food at a hospital that he has been getting food served to him.  Therefore he warrants returning to [facility] as the least restrictive means where further observation, evaluation and treatment can be done safely.    Of note, he has NOT been on intuniv here as it is not on formulary. Should restart at 1mg when he goes to [facility]    7/6: alert, angry, irritable, refused to engage with writer, Moises Maradiaga called last night d/t agitation-redirectable, no prn's, awaiting bed at [facility]  7/7: calm during interview, remains unpredictable as per staff. pending bed. Patient has been refusing am risperidone, accepting night doses x 3 days   7/10: used plastic knife on previous wound on 7/7 - now removing dressing by himself. no PRNs required and behavior has been in control as per staff. no SI or HI. pending bed at [facility]    Recommendations-  --> Continue 1:1 at bedside due to recent SI-low threshold for prn's, security  **please order safety tray - no sharps or utensils near or around patient **  -->Continue current standing medications as ordered on [facility] ML4:   	Depakote 1500mg qhs,   	risperidone  2mg qam and 4mg qhs ( Was 4mg BID)- patient has now been compliant with this dose. will consider increasing if needed  	Remeron 30 mg qhs, klonopin 1 mg daily, Gabapentin 400 mg TID.  	Tenex 1mg daily ( no intuniv available)     --> PRNs: Thorazine 50 mg PO/IM q6h, Ativan 2 mg PO/IM/IV q6h for severe agitation (monitor qtc < 500). Pt with documented allergy on [place] to Haldol and Zyprexa   Dispo: return to [facility] when medically stable

## 2023-07-10 NOTE — PROGRESS NOTE ADULT - SUBJECTIVE AND OBJECTIVE BOX
[facility] Division of Hospital Medicine  Milagros Bashir MD  Available on [employer] TEAMS    SUBJECTIVE / OVERNIGHT EVENTS: Patient seen and examined. Discussed results of testicular US with patient and mother, patient became very upset and anxious about results. Discussed they are stable from Sept 2022.       MEDICATIONS  (STANDING):  atorvastatin 10 milliGRAM(s) Oral at bedtime  bacitracin   Ointment 1 Application(s) Topical daily  clonazePAM  Tablet 1 milliGRAM(s) Oral daily  dextrose 50% Injectable 25 Gram(s) IV Push once  dextrose 50% Injectable 12.5 Gram(s) IV Push once  dextrose 50% Injectable 25 Gram(s) IV Push once  divalproex DR 1500 milliGRAM(s) Oral at bedtime  enoxaparin Injectable 40 milliGRAM(s) SubCutaneous every 12 hours  ferrous    sulfate 325 milliGRAM(s) Oral at bedtime  gabapentin 400 milliGRAM(s) Oral two times a day  glucagon  Injectable 1 milliGRAM(s) IntraMuscular once  guanFACINE 1 milliGRAM(s) Oral at bedtime  ibuprofen  Tablet. 400 milliGRAM(s) Oral once  insulin glargine Injectable (LANTUS) 15 Unit(s) SubCutaneous at bedtime  insulin lispro (ADMELOG) corrective regimen sliding scale   SubCutaneous three times a day before meals  insulin lispro (ADMELOG) corrective regimen sliding scale   SubCutaneous at bedtime  insulin lispro Injectable (ADMELOG) 5 Unit(s) SubCutaneous three times a day before meals  levothyroxine 50 MICROGram(s) Oral daily  loratadine 10 milliGRAM(s) Oral daily  melatonin 3 milliGRAM(s) Oral at bedtime  mirtazapine 30 milliGRAM(s) Oral at bedtime  nystatin Powder 1 Application(s) Topical two times a day  pantoprazole    Tablet 40 milliGRAM(s) Oral before breakfast  risperiDONE   Tablet 4 milliGRAM(s) Oral <User Schedule>  risperiDONE   Tablet 2 milliGRAM(s) Oral <User Schedule>  senna 2 Tablet(s) Oral at bedtime  tamsulosin 0.4 milliGRAM(s) Oral at bedtime    MEDICATIONS  (PRN):  acetaminophen     Tablet .. 650 milliGRAM(s) Oral every 6 hours PRN Temp greater or equal to 38C (100.4F), Mild Pain (1 - 3)  chlorproMAZINE    Injectable 50 milliGRAM(s) IntraMuscular every 6 hours PRN agitation  chlorproMAZINE    Tablet 50 milliGRAM(s) Oral every 6 hours PRN Agitation  dextrose Oral Gel 15 Gram(s) Oral once PRN Blood Glucose LESS THAN 70 milliGRAM(s)/deciliter  guaiFENesin Oral Liquid (Sugar-Free) 100 milliGRAM(s) Oral every 6 hours PRN Cough  LORazepam     Tablet 2 milliGRAM(s) Oral every 6 hours PRN Agitation/Anxiety  LORazepam   Injectable 2 milliGRAM(s) IntraMuscular every 6 hours PRN Agitation  ondansetron Injectable 4 milliGRAM(s) IV Push every 8 hours PRN Nausea and/or Vomiting      I&O's Summary      PHYSICAL EXAM:  Vital Signs Last 24 Hrs  T(C): 36.4 (10 Jul 2023 05:41), Max: 36.6 (09 Jul 2023 18:44)  T(F): 97.5 (10 Jul 2023 05:41), Max: 97.8 (09 Jul 2023 18:44)  HR: 89 (10 Jul 2023 05:41) (83 - 95)  BP: 112/74 (10 Jul 2023 05:41) (112/74 - 130/79)  BP(mean): --  RR: 18 (10 Jul 2023 05:41) (18 - 19)  SpO2: 97% (10 Jul 2023 05:41) (97% - 100%)    Parameters below as of 10 Jul 2023 05:41  Patient On (Oxygen Delivery Method): room air      CONSTITUTIONAL: obese male in NAD, well-developed, well-groomed  EYES: Conjunctiva and sclera clear  ENMT: Moist oral mucosa  RESPIRATORY: Normal respiratory effort  MUSCULOSKELETAL:  No clubbing or cyanosis of digits; No joint swelling or tenderness to palpation  PSYCH: AAOx3 (oriented to person, place, and time); affect irritable and easily agitated     LABS:                        13.7   6.60  )-----------( 260      ( 10 Jul 2023 06:01 )             43.7     07-10    137  |  99  |  11  ----------------------------<  142<H>  4.2   |  23  |  0.72    Ca    9.6      10 Jul 2023 06:01  Phos  4.8     07-10  Mg     1.70     07-10            Urinalysis Basic - ( 10 Jul 2023 06:01 )    Color: x / Appearance: x / SG: x / pH: x  Gluc: 142 mg/dL / Ketone: x  / Bili: x / Urobili: x   Blood: x / Protein: x / Nitrite: x   Leuk Esterase: x / RBC: x / WBC x   Sq Epi: x / Non Sq Epi: x / Bacteria: x        COVID-19 PCR: NotDetec (10 Jul 2023 08:07)  COVID-19 PCR: NotDetec (07 Jul 2023 10:50)  SARS-CoV-2: NotDetec (04 Jul 2023 06:41)  SARS-CoV-2: NotDetec (29 Jun 2023 09:31)  COVID-19 PCR: NotDetec (26 Jun 2023 09:29)  COVID-19 PCR: NotDetec (22 Jun 2023 13:18)  COVID-19 PCR: NotDetec (18 Jun 2023 17:16)  COVID-19 PCR: NotDetec (09 Jun 2023 19:59)  SARS-CoV-2: NotDetec (27 May 2023 01:09)  COVID-19 PCR: NotDetec (12 Apr 2023 18:15)      RADIOLOGY & ADDITIONAL TESTS:  New Results Reviewed Today:   New Imaging Personally Reviewed Today:  New Electrocardiogram Personally Reviewed Today:  Prior or Outpatient Records Reviewed Today:    COMMUNICATION:  Care Discussed with Consultants/Other Providers and Details of Discussion:  Discussions with Patient/Family:  PCP Communication:

## 2023-07-10 NOTE — PROGRESS NOTE ADULT - PROBLEM SELECTOR PLAN 9
DVT ppx: Lovenox ppx  no PT needs  Dispo: dc pending to [facility] once bed is available    7/10: Spoke w/ patient's mother Liz ([phone]) and updated regarding testicular US results

## 2023-07-10 NOTE — PROGRESS NOTE ADULT - PROBLEM SELECTOR PLAN 1
Has hx of seminoma testicular CA s/p L orchiectomy.   - Reports pain, seems frictional with poor hygiene with skin superficial rubbing/breakdown causing chaffing, also states pain has been since surgery  - shower  - apply barrier cream and nystatin  - Testicular US showing "3 mm hypoechoic focus is seen in the region of the right mediastinum testis possibly present on the prior study from 9/29/2022. Close interval follow-up is recommended to exclude malignancy." --> Discussed with urology, recommend close outpatient follow-up for repeat US. No inpatient intervention. Per patient's mother, follows with urology at [place]. Discussed need for repeat US after discharge   - had CT A/P on 6/23/23 w/ no mention of masses  - Check HCG, Alpha-fetoprotein and LDH in AM  - Follows w/ Dr. Watson at [facility]

## 2023-07-11 LAB
GLUCOSE BLDC GLUCOMTR-MCNC: 129 MG/DL — HIGH (ref 70–99)
GLUCOSE BLDC GLUCOMTR-MCNC: 145 MG/DL — HIGH (ref 70–99)
GLUCOSE BLDC GLUCOMTR-MCNC: 213 MG/DL — HIGH (ref 70–99)
GLUCOSE BLDC GLUCOMTR-MCNC: 218 MG/DL — HIGH (ref 70–99)
LDH SERPL L TO P-CCNC: 244 U/L — HIGH (ref 135–225)

## 2023-07-11 PROCEDURE — 99232 SBSQ HOSP IP/OBS MODERATE 35: CPT

## 2023-07-11 PROCEDURE — 99231 SBSQ HOSP IP/OBS SF/LOW 25: CPT

## 2023-07-11 RX ORDER — ACETAMINOPHEN 500 MG
650 TABLET ORAL EVERY 6 HOURS
Refills: 0 | Status: DISCONTINUED | OUTPATIENT
Start: 2023-07-11 | End: 2023-07-13

## 2023-07-11 RX ORDER — IBUPROFEN 200 MG
600 TABLET ORAL EVERY 8 HOURS
Refills: 0 | Status: DISCONTINUED | OUTPATIENT
Start: 2023-07-11 | End: 2023-07-13

## 2023-07-11 RX ORDER — RISPERIDONE 4 MG/1
3 TABLET ORAL
Refills: 0 | Status: DISCONTINUED | OUTPATIENT
Start: 2023-07-11 | End: 2023-07-13

## 2023-07-11 RX ADMIN — GABAPENTIN 400 MILLIGRAM(S): 400 CAPSULE ORAL at 07:04

## 2023-07-11 RX ADMIN — GUANFACINE 1 MILLIGRAM(S): 3 TABLET, EXTENDED RELEASE ORAL at 21:54

## 2023-07-11 RX ADMIN — NYSTATIN CREAM 1 APPLICATION(S): 100000 CREAM TOPICAL at 17:21

## 2023-07-11 RX ADMIN — Medication 5 UNIT(S): at 12:33

## 2023-07-11 RX ADMIN — DIVALPROEX SODIUM 1500 MILLIGRAM(S): 500 TABLET, DELAYED RELEASE ORAL at 21:53

## 2023-07-11 RX ADMIN — Medication 325 MILLIGRAM(S): at 21:53

## 2023-07-11 RX ADMIN — Medication 1 MILLIGRAM(S): at 12:32

## 2023-07-11 RX ADMIN — Medication 2: at 17:20

## 2023-07-11 RX ADMIN — INSULIN GLARGINE 15 UNIT(S): 100 INJECTION, SOLUTION SUBCUTANEOUS at 21:53

## 2023-07-11 RX ADMIN — ENOXAPARIN SODIUM 40 MILLIGRAM(S): 100 INJECTION SUBCUTANEOUS at 17:21

## 2023-07-11 RX ADMIN — TAMSULOSIN HYDROCHLORIDE 0.4 MILLIGRAM(S): 0.4 CAPSULE ORAL at 21:52

## 2023-07-11 RX ADMIN — GABAPENTIN 400 MILLIGRAM(S): 400 CAPSULE ORAL at 17:22

## 2023-07-11 RX ADMIN — PANTOPRAZOLE SODIUM 40 MILLIGRAM(S): 20 TABLET, DELAYED RELEASE ORAL at 07:05

## 2023-07-11 RX ADMIN — LORATADINE 10 MILLIGRAM(S): 10 TABLET ORAL at 12:32

## 2023-07-11 RX ADMIN — RISPERIDONE 4 MILLIGRAM(S): 4 TABLET ORAL at 19:10

## 2023-07-11 RX ADMIN — Medication 5 UNIT(S): at 07:46

## 2023-07-11 RX ADMIN — NYSTATIN CREAM 1 APPLICATION(S): 100000 CREAM TOPICAL at 07:05

## 2023-07-11 RX ADMIN — RISPERIDONE 2 MILLIGRAM(S): 4 TABLET ORAL at 07:04

## 2023-07-11 RX ADMIN — MIRTAZAPINE 30 MILLIGRAM(S): 45 TABLET, ORALLY DISINTEGRATING ORAL at 21:53

## 2023-07-11 RX ADMIN — ENOXAPARIN SODIUM 40 MILLIGRAM(S): 100 INJECTION SUBCUTANEOUS at 07:04

## 2023-07-11 RX ADMIN — Medication 5 UNIT(S): at 17:20

## 2023-07-11 RX ADMIN — Medication 50 MICROGRAM(S): at 07:04

## 2023-07-11 RX ADMIN — SENNA PLUS 2 TABLET(S): 8.6 TABLET ORAL at 21:52

## 2023-07-11 RX ADMIN — Medication 3 MILLIGRAM(S): at 21:53

## 2023-07-11 RX ADMIN — Medication 600 MILLIGRAM(S): at 18:45

## 2023-07-11 RX ADMIN — Medication 600 MILLIGRAM(S): at 19:15

## 2023-07-11 RX ADMIN — ATORVASTATIN CALCIUM 10 MILLIGRAM(S): 80 TABLET, FILM COATED ORAL at 21:53

## 2023-07-11 RX ADMIN — Medication 1 APPLICATION(S): at 12:33

## 2023-07-11 NOTE — PROGRESS NOTE ADULT - PROBLEM SELECTOR PLAN 1
Has hx of seminoma testicular CA s/p L orchiectomy.   - Reports pain, seems frictional with poor hygiene with skin superficial rubbing/breakdown causing chaffing, also states pain has been since surgery  - shower  - apply barrier cream and nystatin  - Testicular US showing "3 mm hypoechoic focus is seen in the region of the right mediastinum testis possibly present on the prior study from 9/29/2022. Close interval follow-up is recommended to exclude malignancy." --> Discussed with urology, recommend close outpatient follow-up for repeat US. No inpatient intervention. Per patient's mother, follows with urology at [place]. Discussed need for repeat US after discharge   - had CT A/P on 6/23/23 w/ no mention of masses  - Check HCG, Alpha-fetoprotein and LDH in AM - patient refused labs this AM   - Follows w/ Dr. Watson at [facility] Has hx of seminoma testicular CA s/p L orchiectomy.   - Reports pain, seems frictional with poor hygiene with skin superficial rubbing/breakdown causing chaffing, also states pain has been since surgery  - shower  - apply barrier cream and nystatin  - Testicular US showing "3 mm hypoechoic focus is seen in the region of the right mediastinum testis possibly present on the prior study from 9/29/2022. Close interval follow-up is recommended to exclude malignancy." --> Discussed with urology, recommend close outpatient follow-up for repeat US. No inpatient intervention. Per patient's mother, follows with urology at [place]. Discussed need for repeat US after discharge   - had CT A/P on 6/23/23 w/ no mention of masses  - Check HCG, Alpha-fetoprotein and LDH in AM - patient refused labs this AM, will reattempt this afternoon   - Follows w/ Dr. Watson at [facility]

## 2023-07-11 NOTE — BH CONSULTATION LIAISON PROGRESS NOTE - NSBHASSESSMENTFT_PSY_ALL_CORE
34 yo M, single, disabled, non-caregiver, resides at a [facility], with psych h/o moderate ID, ASD, impulse control disorder, factitious disorder, HAVEN, mood disorders, PTSD and ADHD, multiple past psych admissions (last known [facility] 6/13/23-present), numerous ED visits for both medical/psych complaints, longstanding h/o SIB (head banging, cutting), but no known SA, longstanding h/o endorsing SI, h/o aggression toward staff at , [facility] significant for asthma, DM, urinary retention, GERD, BPH, hypothyroidism, HLD, HTN, and b/l myopia,  who was admitted to [facility] ML4  for self inflicted laceration to abdomen, endorsing CAH to hurt himself and staff at group home. Pt transferred to [facility] and admitted to medicine on 6/23/23 for lethargy, pneumonia. Patient transferred back to [facility] on 6/28 however immediately came back to [facility] for worsening cough, hypoxia, tachycardia and chest pain.  Now being treated for unresolved PNA.    While patient denies SI,  he also has had a 1:1 watching him the entire time in a room with a curtain. His mother's collateral that he will say the right thing when his money comes into his account at the beginning of the month and then return to  and self injury when he runs out of money is concerning. While he is entitled to buy what he wants to, he is demonstrating poor impulse control already by buying food at a hospital that he has been getting food served to him.  Therefore he warrants returning to [facility] as the least restrictive means where further observation, evaluation and treatment can be done safely.    Of note, he has NOT been on intuniv here as it is not on formulary. Should restart at 1mg when he goes to [facility]    7/6: alert, angry, irritable, refused to engage with writer, Moises Maradiaga called last night d/t agitation-redirectable, no prn's, awaiting bed at [facility]  7/7: calm during interview, remains unpredictable as per staff. pending bed. Patient has been refusing am risperidone, accepting night doses x 3 days   7/10: used plastic knife on previous wound on 7/7 - now removing dressing by himself. no PRNs required and behavior has been in control as per staff. no SI or HI. pending bed at [facility]  7/11: no changes     Recommendations-  --> Continue 1:1 at bedside due to recent SI-low threshold for prn's, security  **please order safety tray - no sharps or utensils near or around patient **  -->Continue current standing medications as ordered on [facility]4:   	Depakote 1500mg qhs,   	INCREASE risperidone to 3 mg qam and 4mg qhs ( Was 4mg BID but lacking compliance)- . will continue to increase as needed  	Remeron 30 mg qhs, klonopin 1 mg daily, Gabapentin 400 mg TID.  	Tenex 1mg daily ( no intuniv available)     --> PRNs: Thorazine 50 mg PO/IM q6h, Ativan 2 mg PO/IM/IV q6h for severe agitation (monitor qtc < 500). Pt with documented allergy on [place] to Haldol and Zyprexa   Dispo: return to [facility] when medically stable

## 2023-07-11 NOTE — PROGRESS NOTE ADULT - SUBJECTIVE AND OBJECTIVE BOX
[facility] Division of Hospital Medicine  Milagros Bashir MD  Available on [employer] TEAMS    SUBJECTIVE / OVERNIGHT EVENTS: Patient seen and examined. Asking if there is a [facility] bed today, explained still no bed and waiting for an update.       MEDICATIONS  (STANDING):  atorvastatin 10 milliGRAM(s) Oral at bedtime  bacitracin   Ointment 1 Application(s) Topical daily  clonazePAM  Tablet 1 milliGRAM(s) Oral daily  dextrose 50% Injectable 25 Gram(s) IV Push once  dextrose 50% Injectable 12.5 Gram(s) IV Push once  dextrose 50% Injectable 25 Gram(s) IV Push once  divalproex DR 1500 milliGRAM(s) Oral at bedtime  enoxaparin Injectable 40 milliGRAM(s) SubCutaneous every 12 hours  ferrous    sulfate 325 milliGRAM(s) Oral at bedtime  gabapentin 400 milliGRAM(s) Oral two times a day  glucagon  Injectable 1 milliGRAM(s) IntraMuscular once  guanFACINE 1 milliGRAM(s) Oral at bedtime  insulin glargine Injectable (LANTUS) 15 Unit(s) SubCutaneous at bedtime  insulin lispro (ADMELOG) corrective regimen sliding scale   SubCutaneous at bedtime  insulin lispro (ADMELOG) corrective regimen sliding scale   SubCutaneous three times a day before meals  insulin lispro Injectable (ADMELOG) 5 Unit(s) SubCutaneous three times a day before meals  levothyroxine 50 MICROGram(s) Oral daily  loratadine 10 milliGRAM(s) Oral daily  melatonin 3 milliGRAM(s) Oral at bedtime  mirtazapine 30 milliGRAM(s) Oral at bedtime  nystatin Powder 1 Application(s) Topical two times a day  pantoprazole    Tablet 40 milliGRAM(s) Oral before breakfast  risperiDONE   Tablet 3 milliGRAM(s) Oral <User Schedule>  risperiDONE   Tablet 4 milliGRAM(s) Oral <User Schedule>  senna 2 Tablet(s) Oral at bedtime  tamsulosin 0.4 milliGRAM(s) Oral at bedtime    MEDICATIONS  (PRN):  acetaminophen     Tablet .. 650 milliGRAM(s) Oral every 6 hours PRN Temp greater or equal to 38C (100.4F), Mild Pain (1 - 3)  chlorproMAZINE    Injectable 50 milliGRAM(s) IntraMuscular every 6 hours PRN agitation  chlorproMAZINE    Tablet 50 milliGRAM(s) Oral every 6 hours PRN Agitation  dextrose Oral Gel 15 Gram(s) Oral once PRN Blood Glucose LESS THAN 70 milliGRAM(s)/deciliter  guaiFENesin Oral Liquid (Sugar-Free) 100 milliGRAM(s) Oral every 6 hours PRN Cough  LORazepam     Tablet 2 milliGRAM(s) Oral every 6 hours PRN Agitation/Anxiety  LORazepam   Injectable 2 milliGRAM(s) IntraMuscular every 6 hours PRN Agitation  ondansetron Injectable 4 milliGRAM(s) IV Push every 8 hours PRN Nausea and/or Vomiting      I&O's Summary      PHYSICAL EXAM:  Vital Signs Last 24 Hrs  T(C): 37 (11 Jul 2023 08:30), Max: 37 (11 Jul 2023 08:30)  T(F): 98.6 (11 Jul 2023 08:30), Max: 98.6 (11 Jul 2023 08:30)  HR: 86 (11 Jul 2023 08:30) (76 - 90)  BP: 122/83 (11 Jul 2023 08:30) (109/72 - 129/96)  BP(mean): --  RR: 20 (11 Jul 2023 08:30) (18 - 20)  SpO2: 100% (11 Jul 2023 08:30) (96% - 100%)    Parameters below as of 11 Jul 2023 08:30  Patient On (Oxygen Delivery Method): room air      CONSTITUTIONAL: obese male in NAD, well-developed, well-groomed  EYES: Conjunctiva and sclera clear  ENMT: Moist oral mucosa  RESPIRATORY: Normal respiratory effort  MUSCULOSKELETAL:  No clubbing or cyanosis of digits; No joint swelling or tenderness to palpation  PSYCH: calm today but appears anxious       LABS:                        13.7   6.60  )-----------( 260      ( 10 Jul 2023 06:01 )             43.7     07-10    137  |  99  |  11  ----------------------------<  142<H>  4.2   |  23  |  0.72    Ca    9.6      10 Jul 2023 06:01  Phos  4.8     07-10  Mg     1.70     07-10            Urinalysis Basic - ( 10 Jul 2023 06:01 )    Color: x / Appearance: x / SG: x / pH: x  Gluc: 142 mg/dL / Ketone: x  / Bili: x / Urobili: x   Blood: x / Protein: x / Nitrite: x   Leuk Esterase: x / RBC: x / WBC x   Sq Epi: x / Non Sq Epi: x / Bacteria: x        COVID-19 PCR: NotDetec (10 Jul 2023 08:07)  COVID-19 PCR: NotDetec (07 Jul 2023 10:50)  SARS-CoV-2: NotDetec (04 Jul 2023 06:41)  SARS-CoV-2: NotDetec (29 Jun 2023 09:31)  COVID-19 PCR: NotDetec (26 Jun 2023 09:29)  COVID-19 PCR: NotDetec (22 Jun 2023 13:18)  COVID-19 PCR: NotDetec (18 Jun 2023 17:16)  COVID-19 PCR: NotDetec (09 Jun 2023 19:59)  SARS-CoV-2: NotDetec (27 May 2023 01:09)  COVID-19 PCR: NotDetec (12 Apr 2023 18:15)      RADIOLOGY & ADDITIONAL TESTS:  New Results Reviewed Today:   New Imaging Personally Reviewed Today:  New Electrocardiogram Personally Reviewed Today:  Prior or Outpatient Records Reviewed Today:    COMMUNICATION:  Care Discussed with Consultants/Other Providers and Details of Discussion:  Discussions with Patient/Family:  PCP Communication:

## 2023-07-11 NOTE — PROGRESS NOTE ADULT - PROBLEM SELECTOR PLAN 9
DVT ppx: Lovenox ppx  no PT needs  Dispo: dc pending to [facility] once bed is available    7/10: Spoke w/ patient's mother Liz ([phone]) and updated regarding testicular US results DVT ppx: Lovenox ppx  no PT needs  Dispo: dc pending to [facility] once bed is available    7/11: Spoke w/ patient's mother Liz ([phone]) and updated.

## 2023-07-12 LAB
AFP-TM SERPL-MCNC: 4.2 NG/ML — SIGNIFICANT CHANGE UP
ANION GAP SERPL CALC-SCNC: 11 MMOL/L — SIGNIFICANT CHANGE UP (ref 7–14)
BUN SERPL-MCNC: 11 MG/DL — SIGNIFICANT CHANGE UP (ref 7–23)
CALCIUM SERPL-MCNC: 9.2 MG/DL — SIGNIFICANT CHANGE UP (ref 8.4–10.5)
CHLORIDE SERPL-SCNC: 102 MMOL/L — SIGNIFICANT CHANGE UP (ref 98–107)
CO2 SERPL-SCNC: 25 MMOL/L — SIGNIFICANT CHANGE UP (ref 22–31)
CREAT SERPL-MCNC: 0.74 MG/DL — SIGNIFICANT CHANGE UP (ref 0.5–1.3)
EGFR: 122 ML/MIN/1.73M2 — SIGNIFICANT CHANGE UP
GLUCOSE BLDC GLUCOMTR-MCNC: 122 MG/DL — HIGH (ref 70–99)
GLUCOSE BLDC GLUCOMTR-MCNC: 125 MG/DL — HIGH (ref 70–99)
GLUCOSE BLDC GLUCOMTR-MCNC: 172 MG/DL — HIGH (ref 70–99)
GLUCOSE BLDC GLUCOMTR-MCNC: 290 MG/DL — HIGH (ref 70–99)
GLUCOSE SERPL-MCNC: 134 MG/DL — HIGH (ref 70–99)
HCG-TM SERPL-ACNC: <1 MIU/ML — SIGNIFICANT CHANGE UP
HCT VFR BLD CALC: 42.9 % — SIGNIFICANT CHANGE UP (ref 39–50)
HGB BLD-MCNC: 13.6 G/DL — SIGNIFICANT CHANGE UP (ref 13–17)
MAGNESIUM SERPL-MCNC: 1.9 MG/DL — SIGNIFICANT CHANGE UP (ref 1.6–2.6)
MCHC RBC-ENTMCNC: 26 PG — LOW (ref 27–34)
MCHC RBC-ENTMCNC: 31.7 GM/DL — LOW (ref 32–36)
MCV RBC AUTO: 81.9 FL — SIGNIFICANT CHANGE UP (ref 80–100)
NRBC # BLD: 0 /100 WBCS — SIGNIFICANT CHANGE UP (ref 0–0)
NRBC # FLD: 0 K/UL — SIGNIFICANT CHANGE UP (ref 0–0)
PHOSPHATE SERPL-MCNC: 4 MG/DL — SIGNIFICANT CHANGE UP (ref 2.5–4.5)
PLATELET # BLD AUTO: 248 K/UL — SIGNIFICANT CHANGE UP (ref 150–400)
POTASSIUM SERPL-MCNC: 4.6 MMOL/L — SIGNIFICANT CHANGE UP (ref 3.5–5.3)
POTASSIUM SERPL-SCNC: 4.6 MMOL/L — SIGNIFICANT CHANGE UP (ref 3.5–5.3)
RBC # BLD: 5.24 M/UL — SIGNIFICANT CHANGE UP (ref 4.2–5.8)
RBC # FLD: 13.7 % — SIGNIFICANT CHANGE UP (ref 10.3–14.5)
SODIUM SERPL-SCNC: 138 MMOL/L — SIGNIFICANT CHANGE UP (ref 135–145)
WBC # BLD: 6.42 K/UL — SIGNIFICANT CHANGE UP (ref 3.8–10.5)
WBC # FLD AUTO: 6.42 K/UL — SIGNIFICANT CHANGE UP (ref 3.8–10.5)

## 2023-07-12 PROCEDURE — 99232 SBSQ HOSP IP/OBS MODERATE 35: CPT

## 2023-07-12 RX ADMIN — SENNA PLUS 2 TABLET(S): 8.6 TABLET ORAL at 21:16

## 2023-07-12 RX ADMIN — TAMSULOSIN HYDROCHLORIDE 0.4 MILLIGRAM(S): 0.4 CAPSULE ORAL at 21:16

## 2023-07-12 RX ADMIN — INSULIN GLARGINE 15 UNIT(S): 100 INJECTION, SOLUTION SUBCUTANEOUS at 21:22

## 2023-07-12 RX ADMIN — ENOXAPARIN SODIUM 40 MILLIGRAM(S): 100 INJECTION SUBCUTANEOUS at 07:25

## 2023-07-12 RX ADMIN — GUANFACINE 1 MILLIGRAM(S): 3 TABLET, EXTENDED RELEASE ORAL at 21:19

## 2023-07-12 RX ADMIN — GABAPENTIN 400 MILLIGRAM(S): 400 CAPSULE ORAL at 17:10

## 2023-07-12 RX ADMIN — Medication 5 UNIT(S): at 17:08

## 2023-07-12 RX ADMIN — Medication 3: at 17:07

## 2023-07-12 RX ADMIN — NYSTATIN CREAM 1 APPLICATION(S): 100000 CREAM TOPICAL at 07:22

## 2023-07-12 RX ADMIN — ATORVASTATIN CALCIUM 10 MILLIGRAM(S): 80 TABLET, FILM COATED ORAL at 21:16

## 2023-07-12 RX ADMIN — ENOXAPARIN SODIUM 40 MILLIGRAM(S): 100 INJECTION SUBCUTANEOUS at 17:10

## 2023-07-12 RX ADMIN — Medication 325 MILLIGRAM(S): at 21:16

## 2023-07-12 RX ADMIN — MIRTAZAPINE 30 MILLIGRAM(S): 45 TABLET, ORALLY DISINTEGRATING ORAL at 21:16

## 2023-07-12 RX ADMIN — RISPERIDONE 4 MILLIGRAM(S): 4 TABLET ORAL at 21:17

## 2023-07-12 RX ADMIN — GABAPENTIN 400 MILLIGRAM(S): 400 CAPSULE ORAL at 07:21

## 2023-07-12 RX ADMIN — Medication 50 MICROGRAM(S): at 07:20

## 2023-07-12 RX ADMIN — Medication 3 MILLIGRAM(S): at 21:16

## 2023-07-12 RX ADMIN — Medication 5 UNIT(S): at 08:01

## 2023-07-12 RX ADMIN — LORATADINE 10 MILLIGRAM(S): 10 TABLET ORAL at 12:30

## 2023-07-12 RX ADMIN — DIVALPROEX SODIUM 1500 MILLIGRAM(S): 500 TABLET, DELAYED RELEASE ORAL at 21:16

## 2023-07-12 RX ADMIN — Medication 1 MILLIGRAM(S): at 12:30

## 2023-07-12 RX ADMIN — PANTOPRAZOLE SODIUM 40 MILLIGRAM(S): 20 TABLET, DELAYED RELEASE ORAL at 07:21

## 2023-07-12 RX ADMIN — RISPERIDONE 3 MILLIGRAM(S): 4 TABLET ORAL at 07:21

## 2023-07-12 NOTE — PROGRESS NOTE ADULT - PROBLEM SELECTOR PROBLEM 7
Hepatic steatosis
DM2 (diabetes mellitus, type 2)
Prophylactic measure
Hepatic steatosis
DM2 (diabetes mellitus, type 2)
Prophylactic measure
Prophylactic measure
DM2 (diabetes mellitus, type 2)
Hepatic steatosis
Prophylactic measure
Prophylactic measure
DM2 (diabetes mellitus, type 2)
DM2 (diabetes mellitus, type 2)

## 2023-07-12 NOTE — PROGRESS NOTE ADULT - PROBLEM SELECTOR PROBLEM 4
Autism
Autism
Impulse control disorder
Impulse control disorder
Autism
Impulse control disorder
Autism
Autism

## 2023-07-12 NOTE — PROGRESS NOTE ADULT - PROBLEM SELECTOR PROBLEM 3
Chest pain
Impulse control disorder
Chest pain
Impulse control disorder

## 2023-07-12 NOTE — BH CONSULTATION LIAISON PROGRESS NOTE - NSBHASSESSMENTFT_PSY_ALL_CORE
34 yo M, single, disabled, non-caregiver, resides at a [facility], with psych h/o moderate ID, ASD, impulse control disorder, factitious disorder, HAVEN, mood disorders, PTSD and ADHD, multiple past psych admissions (last known [facility] 6/13/23-present), numerous ED visits for both medical/psych complaints, longstanding h/o SIB (head banging, cutting), but no known SA, longstanding h/o endorsing SI, h/o aggression toward staff at , [facility] significant for asthma, DM, urinary retention, GERD, BPH, hypothyroidism, HLD, HTN, and b/l myopia,  who was admitted to [facility] ML4  for self inflicted laceration to abdomen, endorsing CAH to hurt himself and staff at group home. Pt transferred to [facility] and admitted to medicine on 6/23/23 for lethargy, pneumonia. Patient transferred back to [facility] on 6/28 however immediately came back to [facility] for worsening cough, hypoxia, tachycardia and chest pain.  Now being treated for unresolved PNA.    While patient denies SI,  he also has had a 1:1 watching him the entire time in a room with a curtain. His mother's collateral that he will say the right thing when his money comes into his account at the beginning of the month and then return to  and self injury when he runs out of money is concerning. While he is entitled to buy what he wants to, he is demonstrating poor impulse control already by buying food at a hospital that he has been getting food served to him.  Therefore he warrants returning to [facility] as the least restrictive means where further observation, evaluation and treatment can be done safely.    Of note, he has NOT been on intuniv here as it is not on formulary. Should restart at 1mg when he goes to [facility]    7/6: alert, angry, irritable, refused to engage with writer, Moises Maradiaga called last night d/t agitation-redirectable, no prn's, awaiting bed at [facility]  7/7: calm during interview, remains unpredictable as per staff. pending bed. Patient has been refusing am risperidone, accepting night doses x 3 days   7/10: used plastic knife on previous wound on 7/7 - now removing dressing by himself. no PRNs required and behavior has been in control as per staff. no SI or HI. pending bed at [facility]  7/11: no changes     7/12: Remains minimizing situation. Awaiting bed. Will not make changes today. Appreciate some rocking in the chair today- will monitor for akithisia.    Recommendations-  --> Continue 1:1 at bedside due to recent SI-low threshold for prn's, security  **please order safety tray - no sharps or utensils near or around patient **  -->Continue current standing medications as ordered on [facility] ML4:   	Depakote 1500mg qhs,   	risperidone to 3 mg qam and 4mg qhs ( Was 4mg BID but lacking compliance)- . will continue to increase as needed  	Remeron 30 mg qhs, klonopin 1 mg daily, Gabapentin 400 mg TID.  	Tenex 1mg daily ( no intuniv available)     --> PRNs: Thorazine 50 mg PO/IM q6h, Ativan 2 mg PO/IM/IV q6h for severe agitation (monitor qtc < 500). Pt with documented allergy on [place] to Haldol and Zyprexa   Dispo: return to [facility] when medically stable

## 2023-07-12 NOTE — PROGRESS NOTE ADULT - SUBJECTIVE AND OBJECTIVE BOX
[facility] Division of Hospital Medicine  Milagros Bashir MD  Available on [employer] TEAMS    SUBJECTIVE / OVERNIGHT EVENTS: Patient seen and examined. Reports that he has no scrotal pain today. Discussed that labs look ok and will update his mom.       MEDICATIONS  (STANDING):  atorvastatin 10 milliGRAM(s) Oral at bedtime  bacitracin   Ointment 1 Application(s) Topical daily  clonazePAM  Tablet 1 milliGRAM(s) Oral daily  dextrose 50% Injectable 25 Gram(s) IV Push once  dextrose 50% Injectable 12.5 Gram(s) IV Push once  dextrose 50% Injectable 25 Gram(s) IV Push once  divalproex DR 1500 milliGRAM(s) Oral at bedtime  enoxaparin Injectable 40 milliGRAM(s) SubCutaneous every 12 hours  ferrous    sulfate 325 milliGRAM(s) Oral at bedtime  gabapentin 400 milliGRAM(s) Oral two times a day  glucagon  Injectable 1 milliGRAM(s) IntraMuscular once  guanFACINE 1 milliGRAM(s) Oral at bedtime  insulin glargine Injectable (LANTUS) 15 Unit(s) SubCutaneous at bedtime  insulin lispro (ADMELOG) corrective regimen sliding scale   SubCutaneous three times a day before meals  insulin lispro (ADMELOG) corrective regimen sliding scale   SubCutaneous at bedtime  insulin lispro Injectable (ADMELOG) 5 Unit(s) SubCutaneous three times a day before meals  levothyroxine 50 MICROGram(s) Oral daily  loratadine 10 milliGRAM(s) Oral daily  melatonin 3 milliGRAM(s) Oral at bedtime  mirtazapine 30 milliGRAM(s) Oral at bedtime  nystatin Powder 1 Application(s) Topical two times a day  pantoprazole    Tablet 40 milliGRAM(s) Oral before breakfast  risperiDONE   Tablet 3 milliGRAM(s) Oral <User Schedule>  risperiDONE   Tablet 4 milliGRAM(s) Oral <User Schedule>  senna 2 Tablet(s) Oral at bedtime  tamsulosin 0.4 milliGRAM(s) Oral at bedtime    MEDICATIONS  (PRN):  acetaminophen     Tablet .. 650 milliGRAM(s) Oral every 6 hours PRN Mild Pain (1 - 3), Moderate Pain (4 - 6)  chlorproMAZINE    Injectable 50 milliGRAM(s) IntraMuscular every 6 hours PRN agitation  chlorproMAZINE    Tablet 50 milliGRAM(s) Oral every 6 hours PRN Agitation  dextrose Oral Gel 15 Gram(s) Oral once PRN Blood Glucose LESS THAN 70 milliGRAM(s)/deciliter  guaiFENesin Oral Liquid (Sugar-Free) 100 milliGRAM(s) Oral every 6 hours PRN Cough  ibuprofen  Tablet. 600 milliGRAM(s) Oral every 8 hours PRN Severe Pain (7 - 10)  LORazepam     Tablet 2 milliGRAM(s) Oral every 6 hours PRN Agitation/Anxiety  LORazepam   Injectable 2 milliGRAM(s) IntraMuscular every 6 hours PRN Agitation  ondansetron Injectable 4 milliGRAM(s) IV Push every 8 hours PRN Nausea and/or Vomiting      I&O's Summary    11 Jul 2023 07:01  -  12 Jul 2023 07:00  --------------------------------------------------------  IN: 0 mL / OUT: 250 mL / NET: -250 mL        PHYSICAL EXAM:  Vital Signs Last 24 Hrs  T(C): 36.4 (12 Jul 2023 06:20), Max: 36.9 (11 Jul 2023 18:10)  T(F): 97.5 (12 Jul 2023 06:20), Max: 98.4 (11 Jul 2023 18:10)  HR: 81 (12 Jul 2023 06:20) (80 - 85)  BP: 128/87 (12 Jul 2023 06:20) (111/77 - 128/87)  BP(mean): --  RR: 18 (12 Jul 2023 06:20) (18 - 18)  SpO2: 96% (12 Jul 2023 06:20) (96% - 100%)    Parameters below as of 12 Jul 2023 06:20  Patient On (Oxygen Delivery Method): room air      CONSTITUTIONAL: obese male in NAD, well-developed, well-groomed  EYES: Conjunctiva and sclera clear  ENMT: Moist oral mucosa  RESPIRATORY: Normal respiratory effort  MUSCULOSKELETAL:  No clubbing or cyanosis of digits; No joint swelling or tenderness to palpation  PSYCH: calm today         LABS:                        13.6   6.42  )-----------( 248      ( 12 Jul 2023 07:40 )             42.9     07-12    138  |  102  |  11  ----------------------------<  134<H>  4.6   |  25  |  0.74    Ca    9.2      12 Jul 2023 07:40  Phos  4.0     07-12  Mg     1.90     07-12            Urinalysis Basic - ( 12 Jul 2023 07:40 )    Color: x / Appearance: x / SG: x / pH: x  Gluc: 134 mg/dL / Ketone: x  / Bili: x / Urobili: x   Blood: x / Protein: x / Nitrite: x   Leuk Esterase: x / RBC: x / WBC x   Sq Epi: x / Non Sq Epi: x / Bacteria: x        COVID-19 PCR: NotDetec (10 Jul 2023 08:07)  COVID-19 PCR: NotDetec (07 Jul 2023 10:50)  SARS-CoV-2: NotDetec (04 Jul 2023 06:41)  SARS-CoV-2: NotDetec (29 Jun 2023 09:31)  COVID-19 PCR: NotDetec (26 Jun 2023 09:29)  COVID-19 PCR: NotDetec (22 Jun 2023 13:18)  COVID-19 PCR: NotDetec (18 Jun 2023 17:16)  COVID-19 PCR: NotDetec (09 Jun 2023 19:59)  SARS-CoV-2: NotDetec (27 May 2023 01:09)  COVID-19 PCR: NotDetec (12 Apr 2023 18:15)      RADIOLOGY & ADDITIONAL TESTS:  New Results Reviewed Today:   New Imaging Personally Reviewed Today:  New Electrocardiogram Personally Reviewed Today:  Prior or Outpatient Records Reviewed Today:    COMMUNICATION:  Care Discussed with Consultants/Other Providers and Details of Discussion:  Discussions with Patient/Family:  PCP Communication:

## 2023-07-12 NOTE — PROGRESS NOTE ADULT - PROBLEM SELECTOR PROBLEM 5
Autism
Hypothyroid
Autism
Hypothyroid
Autism
Hypothyroid

## 2023-07-12 NOTE — PROGRESS NOTE ADULT - PROBLEM SELECTOR PLAN 9
DVT ppx: Lovenox ppx  no PT needs  Dispo: medically stable for dc to [facility] once bed is available    7/12: Spoke w/ patient's mother Liz ([phone]) and updated regarding lab results

## 2023-07-12 NOTE — PROGRESS NOTE ADULT - PROBLEM SELECTOR PROBLEM 1
Unresolved pneumonia
Testicular pain
Unresolved pneumonia
Testicular pain
Unresolved pneumonia
Testicular pain
Unresolved pneumonia

## 2023-07-12 NOTE — PROGRESS NOTE ADULT - PROBLEM SELECTOR PROBLEM 2
Chest pain
Unresolved pneumonia
Unresolved pneumonia
Chest pain
Unresolved pneumonia
Chest pain
Unresolved pneumonia
Chest pain
Unresolved pneumonia

## 2023-07-12 NOTE — PROGRESS NOTE ADULT - PROBLEM SELECTOR PROBLEM 8
Prophylactic measure
Prophylactic measure
Hepatic steatosis
Prophylactic measure
Hepatic steatosis
Hepatic steatosis

## 2023-07-12 NOTE — PROGRESS NOTE ADULT - PROBLEM SELECTOR PROBLEM 6
DM2 (diabetes mellitus, type 2)
DM2 (diabetes mellitus, type 2)
Hypothyroid
DM2 (diabetes mellitus, type 2)
DM2 (diabetes mellitus, type 2)
Hypothyroid
DM2 (diabetes mellitus, type 2)
DM2 (diabetes mellitus, type 2)
Hypothyroid
DM2 (diabetes mellitus, type 2)
DM2 (diabetes mellitus, type 2)
Hypothyroid
Hypothyroid

## 2023-07-12 NOTE — PROGRESS NOTE ADULT - PROBLEM/PLAN-9
FUTURE VISIT INFORMATION      FUTURE VISIT INFORMATION:    Date: 9/17/19    Time: 8:00am    Location: Droopy eye lids  REFERRAL INFORMATION:    Referring provider:  Delmar Sim    Referring providers clinic:  [facility]    Reason for visit/diagnosis  left eye ptosis    RECORDS REQUESTED FROM:       Clinic name Comments Records Status Imaging Status   [facility] Discharge note/referral 8/12/19 EPIC        
DISPLAY PLAN FREE TEXT

## 2023-07-12 NOTE — PROGRESS NOTE ADULT - PROBLEM SELECTOR PLAN 8
Noted on CT  - encourage weight loss  - OP GI f/u
Lovenox ppx  no PT needs  dc [facility], signed out to [facility] 7/5, no beds   COVID ND on 7/4
Noted on CT  - encourage weight loss  - OP GI f/u
Lovenox ppx  no PT needs  dc time 35 min, dc [facility], signed out to [facility]  COVID NG on 7/4
Lovenox ppx  no PT needs  dc [facility], signed out to [facility] 7/5, no beds again today  COVID ND on 7/7

## 2023-07-12 NOTE — PROGRESS NOTE ADULT - PROBLEM SELECTOR PLAN 4
-redirect as needed
From [facility], at baseline at , mother concerned re: safety impulsivity   - BH following recs appreciated for medication management   - QTc 452 on 7/4/23
-redirect as needed
From [facility], at baseline at , mother concerned re: safety impulsivity   - c/w Klonopin 1 daily, Depakote 1500 qhs, gabapentin 400 BID, melatonin 5 qhs, Remeron 30 qhs, risperidone 4 mg BID; agitation Thorazine 50 mg PO/IM q6h, Ativan 2 mg PO/IM/IV q6h for severe agitation (monitor qtc < 500)  - QTc 452 on 7/4/23
From [facility], at baseline at , mother concerned re: safety impulsivity   - c/w Klonopin 1 daily, Depakote 1500 qhs, gabapentin 400 BID, melatonin 5 qhs, Remeron 30 qhs, risperidone 4 mg BID; agitation Thorazine 50 mg PO/IM q6h, Ativan 2 mg PO/IM/IV q6h for severe agitation (monitor qtc < 500)  - QTc 452 on 7/4/23
- meds as above
-redirect as needed
From [facility], at baseline at , mother concerned re: safety impulsivity   - c/w Klonopin 1 daily, Depakote 1500 qhs, gabapentin 400 BID, melatonin 5 qhs, Remeron 30 qhs, risperidone 4 mg BID; agitation Thorazine 50 mg PO/IM q6h, Ativan 2 mg PO/IM/IV q6h for severe agitation (monitor qtc < 500)  - QTc 452 on 7/4/23
From [facility], at baseline at , mother concerned re: safety impulsivity   - c/w Klonopin 1 daily, Depakote 1500 qhs, gabapentin 400 BID, melatonin 5 qhs, Remeron 30 qhs, risperidone 4 mg BID; agitation Thorazine 50 mg PO/IM q6h, Ativan 2 mg PO/IM/IV q6h for severe agitation (monitor qtc < 500)  - QTc 452 on 7/4/23

## 2023-07-12 NOTE — PROGRESS NOTE ADULT - ASSESSMENT
33 intellectual disability, obesity, asthma, autism, impulse control disorder and hyperlipidemia with recent admission for PNA treated with ceftriaxone now returning for tachypnea and tachycardia and admitted for unresolved PNA now s/p treatment. 
33-year-old male past medical history of intellectual disability, obesity, asthma, autism, impulse control disorder and hyperlipidemia with recent admission for PNA treated with ceftriaxone now returning for tachypnea and tachycardia and admitted for unresolved PNA 
33-year-old male past medical history of intellectual disability, obesity, asthma, autism, impulse control disorder and hyperlipidemia with recent admission for PNA treated with ceftriaxone now returning for tachypnea and tachycardia and admitted for unresolved PNA 
33 intellectual disability, obesity, asthma, autism, impulse control disorder and testicular CA s/p orchiectomy with recent admission for PNA treated with ceftriaxone now returning for tachypnea and tachycardia and admitted for unresolved PNA now s/p treatment. Awaiting  bed at [facility]. 
33-year-old male past medical history of intellectual disability, obesity, asthma, autism, impulse control disorder and hyperlipidemia with recent admission for PNA treated with ceftriaxone now returning for tachypnea and tachycardia and admitted for unresolved PNA 
33 intellectual disability, obesity, asthma, autism, impulse control disorder and hyperlipidemia with recent admission for PNA treated with ceftriaxone now returning for tachypnea and tachycardia and admitted for unresolved PNA now s/p treatment. 
33 intellectual disability, obesity, asthma, autism, impulse control disorder and testicular CA s/p orchiectomy with recent admission for PNA treated with ceftriaxone now returning for tachypnea and tachycardia and admitted for unresolved PNA now s/p treatment. Awaiting  bed at [facility]. 
33 intellectual disability, obesity, asthma, autism, impulse control disorder and testicular CA s/p orchiectomy with recent admission for PNA treated with ceftriaxone now returning for tachypnea and tachycardia and admitted for unresolved PNA now s/p treatment. Awaiting  bed at [facility]. 
33 intellectual disability, obesity, asthma, autism, impulse control disorder and hyperlipidemia with recent admission for PNA treated with ceftriaxone now returning for tachypnea and tachycardia and admitted for unresolved PNA now s/p treatment. 
33-year-old male past medical history of intellectual disability, obesity, asthma, autism, impulse control disorder and hyperlipidemia with recent admission for PNA treated with ceftriaxone now returning for tachypnea and tachycardia and admitted for unresolved PNA 
33-year-old male past medical history of intellectual disability, obesity, asthma, autism, impulse control disorder and hyperlipidemia with recent admission for PNA treated with ceftriaxone now returning for tachypnea and tachycardia and admitted for unresolved PNA 
33 intellectual disability, obesity, asthma, autism, impulse control disorder and hyperlipidemia with recent admission for PNA treated with ceftriaxone now returning for tachypnea and tachycardia and admitted for unresolved PNA now s/p treatment. 
33 intellectual disability, obesity, asthma, autism, impulse control disorder and hyperlipidemia with recent admission for PNA treated with ceftriaxone now returning for tachypnea and tachycardia and admitted for unresolved PNA now s/p treatment.

## 2023-07-12 NOTE — PROGRESS NOTE ADULT - PROBLEM SELECTOR PLAN 5
- meds as above
- meds as above
-c/w home dose synthroid  -TSH 3.32
- meds as above
-c/w home dose synthroid  -TSH 3.32
-c/w home dose synthroid  -TSH 3.32
TSH 3.32  - c/w levothyroxine
- meds as above
TSH 3.32  - c/w levothyroxine
TSH 3.32  - c/w levothyroxine
- meds as above

## 2023-07-12 NOTE — PROGRESS NOTE ADULT - PROBLEM SELECTOR PLAN 1
Has hx of seminoma testicular CA s/p L orchiectomy.   - Reports pain, seems frictional with poor hygiene with skin superficial rubbing/breakdown causing chaffing, also states pain has been since surgery  - shower  - apply barrier cream and nystatin  - Testicular US showing "3 mm hypoechoic focus is seen in the region of the right mediastinum testis possibly present on the prior study from 9/29/2022. Close interval follow-up is recommended to exclude malignancy." --> Discussed with urology, recommend close outpatient follow-up for repeat US. No inpatient intervention. Per patient's mother, follows with urology at [place]. Discussed need for repeat US after discharge   - had CT A/P on 6/23/23 w/ no mention of masses  - HCG, Alpha-fetoprotein wnl and  (287 in March) - appears stable   - Follows w/ Dr. Watson at [facility] q3 months

## 2023-07-12 NOTE — PROGRESS NOTE ADULT - NUTRITIONAL ASSESSMENT
This patient has been assessed with a concern for Malnutrition and has been determined to have a diagnosis/diagnoses of Morbid obesity (BMI > 40).    This patient is being managed with:   Diet Soft and Bite Sized-  Consistent Carbohydrate {Evening Snack} (CSTCHOSN)  Mildly Thick Liquids (MILDTHICKLIQS)  Entered: Jun 29 2023  1:48PM  
This patient has been assessed with a concern for Malnutrition and has been determined to have a diagnosis/diagnoses of Morbid obesity (BMI > 40).    This patient is being managed with:   Diet Regular-  Consistent Carbohydrate {No Snacks} (CSTCHO)  Entered: Jul 7 2023 12:54PM    Safety Tray-    Time/Priority:  Routine  Entered: Jul 7 2023 12:53PM  
This patient has been assessed with a concern for Malnutrition and has been determined to have a diagnosis/diagnoses of Morbid obesity (BMI > 40).    This patient is being managed with:   Diet Regular-  Consistent Carbohydrate {No Snacks} (CSTCHO)  Entered: Jul 7 2023 12:54PM    Safety Tray-    Time/Priority:  Routine  Entered: Jul 7 2023 12:53PM  
This patient has been assessed with a concern for Malnutrition and has been determined to have a diagnosis/diagnoses of Morbid obesity (BMI > 40).    This patient is being managed with:   Diet Soft and Bite Sized-  Consistent Carbohydrate {Evening Snack} (CSTCHOSN)  Mildly Thick Liquids (MILDTHICKLIQS)  Entered: Jun 29 2023  1:48PM  
This patient has been assessed with a concern for Malnutrition and has been determined to have a diagnosis/diagnoses of Morbid obesity (BMI > 40).    This patient is being managed with:   Safety Tray-    Time/Priority:  Routine  Entered: Jun 29 2023  4:10PM    Diet Soft and Bite Sized-  Consistent Carbohydrate {Evening Snack} (CSTCHOSN)  Mildly Thick Liquids (MILDTHICKLIQS)  Entered: Jun 29 2023  1:48PM  
This patient has been assessed with a concern for Malnutrition and has been determined to have a diagnosis/diagnoses of Morbid obesity (BMI > 40).    This patient is being managed with:   Diet Regular-  Consistent Carbohydrate {No Snacks} (CSTCHO)  Entered: Jul 7 2023 12:54PM    Safety Tray-    Time/Priority:  Routine  Entered: Jul 7 2023 12:53PM

## 2023-07-12 NOTE — PROGRESS NOTE ADULT - PROBLEM SELECTOR PLAN 2
Likely from PNA   - HsT 7, ekg non ischemic  - CTA negative for PE  - no recurrence   - tylenol or NSAIDS prn   - repeat EKG prn new pain
Recent admission 6/23-6/28 for PNA treated with Ceftriaxone; no WBC elevation on arrival RVP neg  - CTPA: no PE, Tree-in-bud and nodular infiltrates scattered throughout the right upper and lower lobes.  - blood cultures NG x 5 days  - s/p Zosyn 5 days  - S&S on 7/5, regular diet   - RA and amb sat 7/5 wnl
-Cardiac enzyme 7, ekg non ischemic  -CTA negative for PE  -Sinus tach likely driven by agitation, anxiety, underlying psychiatric condition??  -TSH WNL  -no need for tele
Recent admission 6/23-6/28 for PNA treated with Ceftriaxone; no WBC elevation on arrival RVP neg  - CTPA: no PE, Tree-in-bud and nodular infiltrates scattered throughout the right upper and lower lobes.  - blood cultures NG x 5 days  - s/p Zosyn 5 days  - S&S on 7/5, regular diet   - RA and amb sat 7/5 wnl
-Cardiac enzyme 7, ekg non ischemic  -CTA negative for PE  -Sinus tach likely driven by agitation, anxiety, underlying psychiatric condition??  -TSH WNL  -no need for tele
-Cardiac enzyme 7, ekg non ischemic  -CTA negative for PE  -Sinus tach likely driven by agitation, anxiety, underlying psychiatric condition??  -TSH WNL  -no need for tele
Likely from PNA   - HsT 7, ekg non ischemic  - CTA negative for PE  - no recurrence   - tylenol or NSAIDS prn   - repeat EKG prn new pain
Recent admission 6/23-6/28 for PNA treated with Ceftriaxone; no WBC elevation on arrival RVP neg  - CTPA: no PE, Tree-in-bud and nodular infiltrates scattered throughout the right upper and lower lobes.  - blood cultures NG x 5 days  - s/p Zosyn 5 days  - S&S on 7/5, regular diet   - RA and amb sat 7/5 wnl
-Cardiac enzyme 7, ekg non ischemic  -CTA negative for PE  -Sinus tach likely driven by agitation, anxiety, underlying psychiatric condition??  -TSH WNL  -no need for tele
-Cardiac enzyme 7, ekg non ischemic  -CTA negative for PE  -Sinus tach likely driven by agitation, anxiety, underlying psychiatric condition??  -TSH WNL  -no need for tele
Likely from PNA   - HsT 7, ekg non ischemic  - CTA negative for PE  - no recurrence   - Tylenol or NSAIDS prn   - repeat EKG prn new pain

## 2023-07-13 ENCOUNTER — TRANSCRIPTION ENCOUNTER (OUTPATIENT)
Age: 33
End: 2023-07-13

## 2023-07-13 ENCOUNTER — INPATIENT (INPATIENT)
Facility: HOSPITAL | Age: 33
LOS: 7 days | Discharge: HOME | End: 2023-07-21
Attending: PSYCHIATRY & NEUROLOGY | Admitting: PSYCHIATRY & NEUROLOGY
Payer: MEDICAID

## 2023-07-13 VITALS
SYSTOLIC BLOOD PRESSURE: 127 MMHG | HEART RATE: 95 BPM | OXYGEN SATURATION: 97 % | RESPIRATION RATE: 18 BRPM | TEMPERATURE: 98 F | DIASTOLIC BLOOD PRESSURE: 95 MMHG

## 2023-07-13 VITALS — WEIGHT: 298.95 LBS | RESPIRATION RATE: 18 BRPM | TEMPERATURE: 97 F | HEIGHT: 71 IN

## 2023-07-13 DIAGNOSIS — F39 UNSPECIFIED MOOD [AFFECTIVE] DISORDER: ICD-10-CM

## 2023-07-13 DIAGNOSIS — Z86.59 PERSONAL HISTORY OF OTHER MENTAL AND BEHAVIORAL DISORDERS: ICD-10-CM

## 2023-07-13 DIAGNOSIS — F43.10 POST-TRAUMATIC STRESS DISORDER, UNSPECIFIED: ICD-10-CM

## 2023-07-13 LAB — SARS-COV-2 RNA SPEC QL NAA+PROBE: SIGNIFICANT CHANGE UP

## 2023-07-13 PROCEDURE — 99231 SBSQ HOSP IP/OBS SF/LOW 25: CPT

## 2023-07-13 PROCEDURE — 99239 HOSP IP/OBS DSCHRG MGMT >30: CPT

## 2023-07-13 PROCEDURE — 93010 ELECTROCARDIOGRAM REPORT: CPT

## 2023-07-13 RX ORDER — RISPERIDONE 4 MG/1
3 TABLET ORAL DAILY
Refills: 0 | Status: DISCONTINUED | OUTPATIENT
Start: 2023-07-14 | End: 2023-07-17

## 2023-07-13 RX ORDER — RISPERIDONE 4 MG/1
1 TABLET ORAL
Qty: 0 | Refills: 0 | DISCHARGE
Start: 2023-07-13

## 2023-07-13 RX ORDER — CHLORPROMAZINE HCL 10 MG
50 TABLET ORAL EVERY 6 HOURS
Refills: 0 | Status: DISCONTINUED | OUTPATIENT
Start: 2023-07-13 | End: 2023-07-21

## 2023-07-13 RX ORDER — DIVALPROEX SODIUM 500 MG/1
1000 TABLET, DELAYED RELEASE ORAL AT BEDTIME
Refills: 0 | Status: DISCONTINUED | OUTPATIENT
Start: 2023-07-13 | End: 2023-07-21

## 2023-07-13 RX ORDER — CHLORPROMAZINE HCL 10 MG
50 TABLET ORAL ONCE
Refills: 0 | Status: DISCONTINUED | OUTPATIENT
Start: 2023-07-13 | End: 2023-07-21

## 2023-07-13 RX ORDER — DIPHENHYDRAMINE HCL 50 MG
50 CAPSULE ORAL EVERY 6 HOURS
Refills: 0 | Status: DISCONTINUED | OUTPATIENT
Start: 2023-07-13 | End: 2023-07-21

## 2023-07-13 RX ORDER — GABAPENTIN 400 MG/1
400 CAPSULE ORAL THREE TIMES A DAY
Refills: 0 | Status: DISCONTINUED | OUTPATIENT
Start: 2023-07-13 | End: 2023-07-21

## 2023-07-13 RX ORDER — INSULIN GLARGINE 100 [IU]/ML
15 INJECTION, SOLUTION SUBCUTANEOUS
Qty: 0 | Refills: 0 | DISCHARGE
Start: 2023-07-13

## 2023-07-13 RX ORDER — MIRTAZAPINE 45 MG/1
30 TABLET, ORALLY DISINTEGRATING ORAL AT BEDTIME
Refills: 0 | Status: DISCONTINUED | OUTPATIENT
Start: 2023-07-13 | End: 2023-07-21

## 2023-07-13 RX ORDER — INSULIN LISPRO 100/ML
5 VIAL (ML) SUBCUTANEOUS
Qty: 0 | Refills: 0 | DISCHARGE
Start: 2023-07-13

## 2023-07-13 RX ORDER — GUANFACINE 3 MG/1
1 TABLET, EXTENDED RELEASE ORAL AT BEDTIME
Refills: 0 | Status: DISCONTINUED | OUTPATIENT
Start: 2023-07-13 | End: 2023-07-17

## 2023-07-13 RX ORDER — NYSTATIN CREAM 100000 [USP'U]/G
1 CREAM TOPICAL
Qty: 0 | Refills: 0 | DISCHARGE
Start: 2023-07-13

## 2023-07-13 RX ORDER — DIVALPROEX SODIUM 500 MG/1
500 TABLET, DELAYED RELEASE ORAL DAILY
Refills: 0 | Status: DISCONTINUED | OUTPATIENT
Start: 2023-07-14 | End: 2023-07-21

## 2023-07-13 RX ORDER — DIPHENHYDRAMINE HCL 50 MG
50 CAPSULE ORAL ONCE
Refills: 0 | Status: DISCONTINUED | OUTPATIENT
Start: 2023-07-13 | End: 2023-07-21

## 2023-07-13 RX ORDER — CLONAZEPAM 1 MG
0.5 TABLET ORAL
Refills: 0 | Status: DISCONTINUED | OUTPATIENT
Start: 2023-07-13 | End: 2023-07-20

## 2023-07-13 RX ORDER — RISPERIDONE 4 MG/1
4 TABLET ORAL AT BEDTIME
Refills: 0 | Status: DISCONTINUED | OUTPATIENT
Start: 2023-07-13 | End: 2023-07-17

## 2023-07-13 RX ADMIN — DIVALPROEX SODIUM 1000 MILLIGRAM(S): 500 TABLET, DELAYED RELEASE ORAL at 20:08

## 2023-07-13 RX ADMIN — Medication 1 APPLICATION(S): at 14:10

## 2023-07-13 RX ADMIN — GUANFACINE 1 MILLIGRAM(S): 3 TABLET, EXTENDED RELEASE ORAL at 20:08

## 2023-07-13 RX ADMIN — LORATADINE 10 MILLIGRAM(S): 10 TABLET ORAL at 13:15

## 2023-07-13 RX ADMIN — GABAPENTIN 400 MILLIGRAM(S): 400 CAPSULE ORAL at 20:08

## 2023-07-13 RX ADMIN — RISPERIDONE 4 MILLIGRAM(S): 4 TABLET ORAL at 20:08

## 2023-07-13 RX ADMIN — MIRTAZAPINE 30 MILLIGRAM(S): 45 TABLET, ORALLY DISINTEGRATING ORAL at 20:08

## 2023-07-13 RX ADMIN — Medication 0.5 MILLIGRAM(S): at 20:08

## 2023-07-13 RX ADMIN — Medication 1 MILLIGRAM(S): at 13:15

## 2023-07-13 NOTE — BH CONSULTATION LIAISON PROGRESS NOTE - NSICDXBHSECONDARYDX_PSY_ALL_CORE
Pneumonia   J18.9  Autism   F84.0  

## 2023-07-13 NOTE — BH INPATIENT PSYCHIATRY ASSESSMENT NOTE - HPI (INCLUDE ILLNESS QUALITY, SEVERITY, DURATION, TIMING, CONTEXT, MODIFYING FACTORS, ASSOCIATED SIGNS AND SYMPTOMS)
Patient is a 33 year old  male, single, domiciled at [facility]. Patient has PPH of moderate ID, ASD, impulse control disorder, factitious disorder, HAVEN, mood disorders, PTSD and ADHD. Patient has extensive history of inpatient hospitalization and ED visits for both medical/psych complaints, most recently was discharged from [facility] on 6/6/23, longstanding h/o SIB (head banging, cutting), but no known SA, longstanding h/o endorsing SI, h/o aggression toward staff at , PMHx significant for asthma, DM, urinary retention, GERD, BPH, hypothyroidism, HLD, HTN, and b/l myopia. Patient was admitted to [facility]4 for self inflicted laceration to abdomen, endorsing CAH to hurt himself and staff at group home. He was transferred to ED for sinus tachy and tachypnea and eventually admitted for unresolved PNA. On [facility] admission 6/23-6/28 for PNA treated with Ceftriaxone; no WBC elevation on readmit, RVP neg - CTPA: no PE, Tree-in-bud and nodular infiltrates scattered throughout the right upper and lower lobes, blood cultures NG x 5 days, s/p Zosyn 5 days. Patient medically cleared and now transferred back to [facility].      As per  psychiatry notes:  7/6: alert, angry, irritable, refused to engage with writer, Moises Maradiaga called last night d/t agitation-redirectable, no prn's, awaiting bed at [facility]  7/7: calm during interview, remains unpredictable as per staff. pending bed. Patient has been refusing am risperidone, accepting night doses x 3 days   7/10: used plastic knife on previous wound on 7/7 - now removing dressing by himself. no PRNs required and behavior has been in control as per staff. no SI or HI. pending bed at [facility]  7/11: No changes   7/12: Remains minimizing situation. Awaiting bed. Will not make changes today. Appreciate some rocking in the chair today- will monitor for akithisia.  7/13: transfer to [facility] 4 - report given to NP Abhishek - patient expressed desire to return to [facility] due to passive thoughts of self harm - NP made aware patient has been on CO for impulsivity and has hx of harming self here in [facility] with plastic knife.       On unit:  Patient was seen and is evaluated in the interview room. He presents with bright affect and is able to engage appropriately with writer. He states that he had initially presented to the hospital after he has stabbed himself in the abdomen, proceeds to show writer this wound and verbalizes hurting himself due to CAH. He reports feeling better physically after being discharged from [facility], somewhat upset with returning to [facility] as patient was ordering food for himself using [employer] at [facility] and is no longer allowed to do so here. He does report trying to use a plastic knife to open his abdominal wound during hospitalization, verbalizes that he did this after he had an argument with his mother. When discussing this argument he states that his mother wanted him to return to [facility] for further treatment, while patient did not want to return. When asked why patient had started to refuse his AM dose of Risperidone, he states that he was upset at a nurse who had woken him up to take medications. He describes his mood as "good" and smiles while saying this. He reports having good appetite, does express some difficulty with falling asleep.      Patient is a 33 year old  male, single, domiciled at [facility]. Patient has PPH of moderate ID, ASD, impulse control disorder, factitious disorder, HAVEN, mood disorders, PTSD and ADHD. Patient has extensive history of inpatient hospitalization and ED visits for both medical/psych complaints, most recently was discharged from [facility] on 6/6/23, longstanding h/o SIB (head banging, cutting), but no known SA, longstanding h/o endorsing SI, h/o aggression toward staff at , PMHx significant for asthma, DM, urinary retention, GERD, BPH, hypothyroidism, HLD, HTN, and b/l myopia. Patient was admitted to [facility] ML4 for self inflicted laceration to abdomen, endorsing CAH to hurt himself and staff at group home. He was transferred to ED for sinus tachy and tachypnea and eventually admitted for unresolved PNA. On [facility] admission 6/23-6/28 for PNA treated with Ceftriaxone; no WBC elevation on readmit, RVP neg - CTPA: no PE, Tree-in-bud and nodular infiltrates scattered throughout the right upper and lower lobes, blood cultures NG x 5 days, s/p Zosyn 5 days. Patient medically cleared and now transferred back to [facility].      As per  psychiatry notes:  7/6: alert, angry, irritable, refused to engage with writer, Moises Maradiaga called last night d/t agitation-redirectable, no prn's, awaiting bed at [facility]  7/7: calm during interview, remains unpredictable as per staff. pending bed. Patient has been refusing am risperidone, accepting night doses x 3 days   7/10: used plastic knife on previous wound on 7/7 - now removing dressing by himself. no PRNs required and behavior has been in control as per staff. no SI or HI. pending bed at [facility]  7/11: No changes   7/12: Remains minimizing situation. Awaiting bed. Will not make changes today. Appreciate some rocking in the chair today- will monitor for akithisia.  7/13: transfer to [facility] 4 - report given to NP Abhishek - patient expressed desire to return to [facility] due to passive thoughts of self harm - NP made aware patient has been on CO for impulsivity and has hx of harming self here in [facility] with plastic knife.       On unit:  Patient was seen and is evaluated in the interview room. He presents with bright affect and is able to engage appropriately with writer. He states that he had initially presented to the hospital after he has stabbed himself in the abdomen, proceeds to show writer this wound and verbalizes hurting himself due to CAH. He reports feeling better physically after being discharged from [facility], somewhat upset with returning to [facility] as patient was ordering food for himself using [employer] at [facility] and is no longer allowed to do so here. He does report trying to use a plastic knife to open his abdominal wound during hospitalization, verbalizes that he did this after he had an argument with his mother. When discussing this argument he states that his mother wanted him to return to [facility] for further treatment, while patient did not want to return. When asked why patient had started to refuse his AM dose of Risperidone, he states that he was upset at a nurse who had woken him up to take medications. He describes his mood as "good" and smiles while saying this. He reports having good appetite, does express some difficulty with sleep initiation.     Patient is a 33 year old  male, single, domiciled at [facility]. Patient has PPH of moderate ID, ASD, impulse control disorder, factitious disorder, HAVEN, mood disorders, PTSD and ADHD. Patient has extensive history of inpatient hospitalization and ED visits for both medical/psych complaints, most recently was discharged from [facility] on 6/6/23, longstanding h/o SIB (head banging, cutting), but no known SA, longstanding h/o endorsing SI, h/o aggression toward staff at , PMHx significant for asthma, DM, urinary retention, GERD, BPH, hypothyroidism, HLD, HTN, and b/l myopia. Patient was admitted to [facility]4 for self inflicted laceration to abdomen, endorsing CAH to hurt himself and staff at group home. He was transferred to ED for sinus tachy and tachypnea and eventually admitted for unresolved PNA. On [facility] admission 6/23-6/28 for PNA treated with Ceftriaxone; no WBC elevation on readmit, RVP neg - CTPA: no PE, Tree-in-bud and nodular infiltrates scattered throughout the right upper and lower lobes, blood cultures NG x 5 days, s/p Zosyn 5 days. Patient medically cleared and now transferred back to [facility].      As per  psychiatry notes:  7/6: alert, angry, irritable, refused to engage with writer, Moises Maradiaga called last night d/t agitation-redirectable, no prn's, awaiting bed at [facility]  7/7: calm during interview, remains unpredictable as per staff. pending bed. Patient has been refusing am risperidone, accepting night doses x 3 days   7/10: used plastic knife on previous wound on 7/7 - now removing dressing by himself. no PRNs required and behavior has been in control as per staff. no SI or HI. pending bed at [facility]  7/11: No changes   7/12: Remains minimizing situation. Awaiting bed. Will not make changes today. Appreciate some rocking in the chair today- will monitor for akithisia.  7/13: transfer to [facility] 4 - report given to NP Abhishek - patient expressed desire to return to [facility] due to passive thoughts of self harm - NP made aware patient has been on CO for impulsivity and has hx of harming self here in [facility] with plastic knife.       On unit:  Patient was seen and is evaluated in the interview room. He presents with bright affect and is able to engage appropriately with writer. He states that he had initially presented to the hospital after he has stabbed himself in the abdomen, proceeds to show writer this wound and verbalizes hurting himself due to CAH. He reports feeling better physically after being discharged from [facility], somewhat upset with returning to [facility] as patient was ordering food for himself using [employer] at [facility] and is no longer allowed to do so here. He does report trying to use a plastic knife to open his abdominal wound during hospitalization, verbalizes that he did this after he had an argument with his mother. When discussing this argument he states that his mother wanted him to return to [facility] for further treatment, while patient did not want to return. When asked why patient had started to refuse his AM dose of Risperidone, he states that he was upset at a nurse who had awoke him to take medications., was restarted on AM Risperidone and has been compliant. He describes his mood as "good" and smiles while saying this. He reports having good appetite, does express some difficulty with sleep initiation. He denies currently having any SIB/SI plan or intent, no prior hx of SA, does have hx of NSSIB. We discussed continuing patient on CO for self-injurious behavior due to significant hx of impulsivity, SIB, and attempts to reopen his abdominal wound during hospitalization at [facility]. He verbalizes that the feeling of wanting to harm himself is often impulsive and a response to changes in mood/stress. He denies any HIIP, does have hx of aggressive behavior and has previously been tazed by police due to severity of aggression. He states that he would like to stay in the hospital longer at he wants to be placed at another group home, verbalizes being on a waitlist for a group home in [place], [place]. Goes on to report that he does not feel like he is being treated appropriately at the group home and is ridiculed at times over his weight. Patient denies A/V hallucinations, delusional thoughts, magical thinking, thought broadcasting/insertion, ideas of reference. He denies symptoms suggestive of everett such as insomnia with increase in energy/goal directed activities, grandiosity, racing thoughts, impulsive or risk-taking behaviors, hypersexuality. He denies symptoms associated with depression such as dysphoric mood, disturbances in appetite, impaired concentration, anergia, anhedonia, feelings of hopelessness/worthlessness/helplessness, suicidal ideations, crying spells. Does report hx of PTSD, endorses occasional nightmares. He denies any illicit substance use, does report using a nicotine vape.

## 2023-07-13 NOTE — BH CONSULTATION LIAISON PROGRESS NOTE - NSBHATTESTBILLING_PSY_A_CORE
Billing in another system
[id number] OBS or IP - moderate complexity OR 35-49 mins
Billing in another system
Billing in another system
43906-Ezziegarqd OBS or IP - moderate complexity OR 35-49 mins

## 2023-07-13 NOTE — BH CONSULTATION LIAISON PROGRESS NOTE - NSBHPTASSESSDT_PSY_A_CORE
06-Jul-2023 12:11
11-Jul-2023 13:35
10-Jul-2023 11:31
05-Jul-2023 12:58
12-Jul-2023 13:14
03-Jul-2023 13:57
07-Jul-2023 14:56
13-Jul-2023 12:24

## 2023-07-13 NOTE — BH CONSULTATION LIAISON PROGRESS NOTE - NSBHASSESSMENTFT_PSY_ALL_CORE
34 yo M, single, disabled, non-caregiver, resides at a [facility], with psych h/o moderate ID, ASD, impulse control disorder, factitious disorder, HAVEN, mood disorders, PTSD and ADHD, multiple past psych admissions (last known [facility] 6/13/23-present), numerous ED visits for both medical/psych complaints, longstanding h/o SIB (head banging, cutting), but no known SA, longstanding h/o endorsing SI, h/o aggression toward staff at , [facility] significant for asthma, DM, urinary retention, GERD, BPH, hypothyroidism, HLD, HTN, and b/l myopia,  who was admitted to [facility] ML4  for self inflicted laceration to abdomen, endorsing CAH to hurt himself and staff at group home. Pt transferred to [facility] and admitted to medicine on 6/23/23 for lethargy, pneumonia. Patient transferred back to [facility] on 6/28 however immediately came back to [facility] for worsening cough, hypoxia, tachycardia and chest pain.  Now being treated for unresolved PNA.    While patient denies SI,  he also has had a 1:1 watching him the entire time in a room with a curtain. His mother's collateral that he will say the right thing when his money comes into his account at the beginning of the month and then return to  and self injury when he runs out of money is concerning. While he is entitled to buy what he wants to, he is demonstrating poor impulse control already by buying food at a hospital that he has been getting food served to him.  Therefore he warrants returning to [facility] as the least restrictive means where further observation, evaluation and treatment can be done safely.    Of note, he has NOT been on intuniv here as it is not on formulary. Should restart at 1mg when he goes to [facility]    7/6: alert, angry, irritable, refused to engage with writer, Moises Maradiaga called last night d/t agitation-redirectable, no prn's, awaiting bed at [facility]  7/7: calm during interview, remains unpredictable as per staff. pending bed. Patient has been refusing am risperidone, accepting night doses x 3 days   7/10: used plastic knife on previous wound on 7/7 - now removing dressing by himself. no PRNs required and behavior has been in control as per staff. no SI or HI. pending bed at [facility]  7/11: no changes     7/12: Remains minimizing situation. Awaiting bed. Will not make changes today. Appreciate some rocking in the chair today- will monitor for akithisia.  7/13: transfer to  4 - report given to NP Abhishek - patient expressed desire to return to [facility] due to passive thoughts of self harm - NP made aware patient has been on CO for impulsivity and has hx of harming self here in [facility] with plastic knife.     Recommendations-  --> Continue 1:1 at bedside due to recent SI-low threshold for prn's, security  **please order safety tray - no sharps or utensils near or around patient **  -->Continue current standing medications as ordered on [facility] ML4:   	Depakote 1500mg qhs,   	risperidone to 3 mg qam and 4mg qhs ( Was 4mg BID but lacking compliance)- ( please note patient refused am meds 7/13)  	Remeron 30 mg qhs, klonopin 1 mg daily, Gabapentin 400 mg TID.  	Tenex 1mg daily ( no intuniv available)     --> PRNs: Thorazine 50 mg PO/IM q6h, Ativan 2 mg PO/IM/IV q6h for severe agitation (monitor qtc < 500). Pt with documented allergy on [place] to Haldol and Zyprexa   Dispo: return to [facility] 4 - sign out given

## 2023-07-13 NOTE — BH INPATIENT PSYCHIATRY ASSESSMENT NOTE - VIOLENCE RISK FACTORS:
Feeling of being under threat and being unable to control threat/Impulsivity/History of being victimized/traumatized/Irritability

## 2023-07-13 NOTE — BH CONSULTATION LIAISON PROGRESS NOTE - CURRENT MEDICATION
MEDICATIONS  (STANDING):  atorvastatin 10 milliGRAM(s) Oral at bedtime  bacitracin   Ointment 1 Application(s) Topical daily  clonazePAM  Tablet 1 milliGRAM(s) Oral daily  dextrose 50% Injectable 25 Gram(s) IV Push once  dextrose 50% Injectable 12.5 Gram(s) IV Push once  dextrose 50% Injectable 25 Gram(s) IV Push once  divalproex DR 1500 milliGRAM(s) Oral at bedtime  enoxaparin Injectable 40 milliGRAM(s) SubCutaneous every 12 hours  ferrous    sulfate 325 milliGRAM(s) Oral at bedtime  gabapentin 400 milliGRAM(s) Oral two times a day  glucagon  Injectable 1 milliGRAM(s) IntraMuscular once  guanFACINE 1 milliGRAM(s) Oral at bedtime  insulin glargine Injectable (LANTUS) 15 Unit(s) SubCutaneous at bedtime  insulin lispro (ADMELOG) corrective regimen sliding scale   SubCutaneous at bedtime  insulin lispro (ADMELOG) corrective regimen sliding scale   SubCutaneous three times a day before meals  insulin lispro Injectable (ADMELOG) 5 Unit(s) SubCutaneous three times a day before meals  levothyroxine 50 MICROGram(s) Oral daily  loratadine 10 milliGRAM(s) Oral daily  melatonin 3 milliGRAM(s) Oral at bedtime  mirtazapine 30 milliGRAM(s) Oral at bedtime  nystatin Powder 1 Application(s) Topical two times a day  pantoprazole    Tablet 40 milliGRAM(s) Oral before breakfast  risperiDONE   Tablet 2 milliGRAM(s) Oral <User Schedule>  risperiDONE   Tablet 4 milliGRAM(s) Oral <User Schedule>  senna 2 Tablet(s) Oral at bedtime  tamsulosin 0.4 milliGRAM(s) Oral at bedtime    MEDICATIONS  (PRN):  acetaminophen     Tablet .. 650 milliGRAM(s) Oral every 6 hours PRN Temp greater or equal to 38C (100.4F), Mild Pain (1 - 3)  chlorproMAZINE    Injectable 50 milliGRAM(s) IntraMuscular every 6 hours PRN agitation  chlorproMAZINE    Tablet 50 milliGRAM(s) Oral every 6 hours PRN Agitation  dextrose Oral Gel 15 Gram(s) Oral once PRN Blood Glucose LESS THAN 70 milliGRAM(s)/deciliter  guaiFENesin Oral Liquid (Sugar-Free) 100 milliGRAM(s) Oral every 6 hours PRN Cough  LORazepam     Tablet 2 milliGRAM(s) Oral every 6 hours PRN Agitation/Anxiety  LORazepam   Injectable 2 milliGRAM(s) IntraMuscular every 6 hours PRN Agitation  ondansetron Injectable 4 milliGRAM(s) IV Push every 8 hours PRN Nausea and/or Vomiting  
MEDICATIONS  (STANDING):  atorvastatin 10 milliGRAM(s) Oral at bedtime  bacitracin   Ointment 1 Application(s) Topical daily  clonazePAM  Tablet 1 milliGRAM(s) Oral daily  dextrose 50% Injectable 25 Gram(s) IV Push once  dextrose 50% Injectable 12.5 Gram(s) IV Push once  dextrose 50% Injectable 25 Gram(s) IV Push once  divalproex DR 1500 milliGRAM(s) Oral at bedtime  enoxaparin Injectable 40 milliGRAM(s) SubCutaneous every 12 hours  ferrous    sulfate 325 milliGRAM(s) Oral at bedtime  gabapentin 400 milliGRAM(s) Oral two times a day  glucagon  Injectable 1 milliGRAM(s) IntraMuscular once  guanFACINE 1 milliGRAM(s) Oral at bedtime  insulin glargine Injectable (LANTUS) 15 Unit(s) SubCutaneous at bedtime  insulin lispro (ADMELOG) corrective regimen sliding scale   SubCutaneous three times a day before meals  insulin lispro (ADMELOG) corrective regimen sliding scale   SubCutaneous at bedtime  insulin lispro Injectable (ADMELOG) 5 Unit(s) SubCutaneous three times a day before meals  levothyroxine 50 MICROGram(s) Oral daily  loratadine 10 milliGRAM(s) Oral daily  melatonin 3 milliGRAM(s) Oral at bedtime  mirtazapine 30 milliGRAM(s) Oral at bedtime  pantoprazole    Tablet 40 milliGRAM(s) Oral before breakfast  risperiDONE   Tablet 4 milliGRAM(s) Oral two times a day  senna 2 Tablet(s) Oral at bedtime  tamsulosin 0.4 milliGRAM(s) Oral at bedtime    MEDICATIONS  (PRN):  acetaminophen     Tablet .. 650 milliGRAM(s) Oral every 6 hours PRN Temp greater or equal to 38C (100.4F), Mild Pain (1 - 3)  chlorproMAZINE    Injectable 50 milliGRAM(s) IntraMuscular every 6 hours PRN agitation  chlorproMAZINE    Tablet 50 milliGRAM(s) Oral every 6 hours PRN Agitation  dextrose Oral Gel 15 Gram(s) Oral once PRN Blood Glucose LESS THAN 70 milliGRAM(s)/deciliter  guaiFENesin Oral Liquid (Sugar-Free) 100 milliGRAM(s) Oral every 6 hours PRN Cough  LORazepam     Tablet 2 milliGRAM(s) Oral every 6 hours PRN Agitation/Anxiety  LORazepam   Injectable 2 milliGRAM(s) IntraMuscular every 6 hours PRN Agitation  ondansetron Injectable 4 milliGRAM(s) IV Push every 8 hours PRN Nausea and/or Vomiting  
MEDICATIONS  (STANDING):  atorvastatin 10 milliGRAM(s) Oral at bedtime  bacitracin   Ointment 1 Application(s) Topical daily  clonazePAM  Tablet 1 milliGRAM(s) Oral daily  dextrose 5%. 1000 milliLiter(s) (100 mL/Hr) IV Continuous <Continuous>  dextrose 5%. 1000 milliLiter(s) (50 mL/Hr) IV Continuous <Continuous>  dextrose 50% Injectable 25 Gram(s) IV Push once  dextrose 50% Injectable 12.5 Gram(s) IV Push once  dextrose 50% Injectable 25 Gram(s) IV Push once  divalproex DR 1500 milliGRAM(s) Oral at bedtime  enoxaparin Injectable 40 milliGRAM(s) SubCutaneous every 12 hours  ferrous    sulfate 325 milliGRAM(s) Oral at bedtime  gabapentin 400 milliGRAM(s) Oral two times a day  glucagon  Injectable 1 milliGRAM(s) IntraMuscular once  guanFACINE 1 milliGRAM(s) Oral at bedtime  insulin lispro (ADMELOG) corrective regimen sliding scale   SubCutaneous at bedtime  insulin lispro (ADMELOG) corrective regimen sliding scale   SubCutaneous three times a day before meals  levothyroxine 50 MICROGram(s) Oral daily  loratadine 10 milliGRAM(s) Oral daily  melatonin 3 milliGRAM(s) Oral at bedtime  mirtazapine 30 milliGRAM(s) Oral at bedtime  pantoprazole    Tablet 40 milliGRAM(s) Oral before breakfast  risperiDONE   Tablet 4 milliGRAM(s) Oral two times a day  senna 2 Tablet(s) Oral at bedtime  tamsulosin 0.4 milliGRAM(s) Oral at bedtime    MEDICATIONS  (PRN):  acetaminophen     Tablet .. 650 milliGRAM(s) Oral every 6 hours PRN Temp greater or equal to 38C (100.4F), Mild Pain (1 - 3)  chlorproMAZINE    Injectable 50 milliGRAM(s) IntraMuscular every 6 hours PRN agitation  chlorproMAZINE    Tablet 50 milliGRAM(s) Oral every 6 hours PRN Agitation  dextrose Oral Gel 15 Gram(s) Oral once PRN Blood Glucose LESS THAN 70 milliGRAM(s)/deciliter  guaiFENesin Oral Liquid (Sugar-Free) 100 milliGRAM(s) Oral every 6 hours PRN Cough  LORazepam     Tablet 2 milliGRAM(s) Oral every 6 hours PRN Agitation/Anxiety  LORazepam   Injectable 2 milliGRAM(s) IntraMuscular every 6 hours PRN Agitation  ondansetron Injectable 4 milliGRAM(s) IV Push every 8 hours PRN Nausea and/or Vomiting  
MEDICATIONS  (STANDING):  atorvastatin 10 milliGRAM(s) Oral at bedtime  bacitracin   Ointment 1 Application(s) Topical daily  clonazePAM  Tablet 1 milliGRAM(s) Oral daily  dextrose 50% Injectable 25 Gram(s) IV Push once  dextrose 50% Injectable 12.5 Gram(s) IV Push once  dextrose 50% Injectable 25 Gram(s) IV Push once  divalproex DR 1500 milliGRAM(s) Oral at bedtime  enoxaparin Injectable 40 milliGRAM(s) SubCutaneous every 12 hours  ferrous    sulfate 325 milliGRAM(s) Oral at bedtime  gabapentin 400 milliGRAM(s) Oral two times a day  glucagon  Injectable 1 milliGRAM(s) IntraMuscular once  guanFACINE 1 milliGRAM(s) Oral at bedtime  insulin glargine Injectable (LANTUS) 15 Unit(s) SubCutaneous at bedtime  insulin lispro (ADMELOG) corrective regimen sliding scale   SubCutaneous three times a day before meals  insulin lispro (ADMELOG) corrective regimen sliding scale   SubCutaneous at bedtime  insulin lispro Injectable (ADMELOG) 5 Unit(s) SubCutaneous three times a day before meals  levothyroxine 50 MICROGram(s) Oral daily  loratadine 10 milliGRAM(s) Oral daily  melatonin 3 milliGRAM(s) Oral at bedtime  mirtazapine 30 milliGRAM(s) Oral at bedtime  nystatin Powder 1 Application(s) Topical two times a day  pantoprazole    Tablet 40 milliGRAM(s) Oral before breakfast  risperiDONE   Tablet 3 milliGRAM(s) Oral <User Schedule>  risperiDONE   Tablet 4 milliGRAM(s) Oral <User Schedule>  senna 2 Tablet(s) Oral at bedtime  tamsulosin 0.4 milliGRAM(s) Oral at bedtime    MEDICATIONS  (PRN):  acetaminophen     Tablet .. 650 milliGRAM(s) Oral every 6 hours PRN Mild Pain (1 - 3), Moderate Pain (4 - 6)  chlorproMAZINE    Injectable 50 milliGRAM(s) IntraMuscular every 6 hours PRN agitation  chlorproMAZINE    Tablet 50 milliGRAM(s) Oral every 6 hours PRN Agitation  dextrose Oral Gel 15 Gram(s) Oral once PRN Blood Glucose LESS THAN 70 milliGRAM(s)/deciliter  guaiFENesin Oral Liquid (Sugar-Free) 100 milliGRAM(s) Oral every 6 hours PRN Cough  ibuprofen  Tablet. 600 milliGRAM(s) Oral every 8 hours PRN Severe Pain (7 - 10)  LORazepam     Tablet 2 milliGRAM(s) Oral every 6 hours PRN Agitation/Anxiety  LORazepam   Injectable 2 milliGRAM(s) IntraMuscular every 6 hours PRN Agitation  ondansetron Injectable 4 milliGRAM(s) IV Push every 8 hours PRN Nausea and/or Vomiting  
MEDICATIONS  (STANDING):  atorvastatin 10 milliGRAM(s) Oral at bedtime  bacitracin   Ointment 1 Application(s) Topical daily  clonazePAM  Tablet 1 milliGRAM(s) Oral daily  dextrose 50% Injectable 25 Gram(s) IV Push once  dextrose 50% Injectable 12.5 Gram(s) IV Push once  dextrose 50% Injectable 25 Gram(s) IV Push once  divalproex DR 1500 milliGRAM(s) Oral at bedtime  enoxaparin Injectable 40 milliGRAM(s) SubCutaneous every 12 hours  ferrous    sulfate 325 milliGRAM(s) Oral at bedtime  gabapentin 400 milliGRAM(s) Oral two times a day  glucagon  Injectable 1 milliGRAM(s) IntraMuscular once  guanFACINE 1 milliGRAM(s) Oral at bedtime  insulin glargine Injectable (LANTUS) 15 Unit(s) SubCutaneous at bedtime  insulin lispro (ADMELOG) corrective regimen sliding scale   SubCutaneous three times a day before meals  insulin lispro (ADMELOG) corrective regimen sliding scale   SubCutaneous at bedtime  insulin lispro Injectable (ADMELOG) 5 Unit(s) SubCutaneous three times a day before meals  levothyroxine 50 MICROGram(s) Oral daily  loratadine 10 milliGRAM(s) Oral daily  melatonin 3 milliGRAM(s) Oral at bedtime  mirtazapine 30 milliGRAM(s) Oral at bedtime  nystatin Powder 1 Application(s) Topical two times a day  pantoprazole    Tablet 40 milliGRAM(s) Oral before breakfast  risperiDONE   Tablet 3 milliGRAM(s) Oral <User Schedule>  risperiDONE   Tablet 4 milliGRAM(s) Oral <User Schedule>  senna 2 Tablet(s) Oral at bedtime  tamsulosin 0.4 milliGRAM(s) Oral at bedtime    MEDICATIONS  (PRN):  acetaminophen     Tablet .. 650 milliGRAM(s) Oral every 6 hours PRN Mild Pain (1 - 3), Moderate Pain (4 - 6)  chlorproMAZINE    Injectable 50 milliGRAM(s) IntraMuscular every 6 hours PRN agitation  chlorproMAZINE    Tablet 50 milliGRAM(s) Oral every 6 hours PRN Agitation  dextrose Oral Gel 15 Gram(s) Oral once PRN Blood Glucose LESS THAN 70 milliGRAM(s)/deciliter  guaiFENesin Oral Liquid (Sugar-Free) 100 milliGRAM(s) Oral every 6 hours PRN Cough  ibuprofen  Tablet. 600 milliGRAM(s) Oral every 8 hours PRN Severe Pain (7 - 10)  LORazepam     Tablet 2 milliGRAM(s) Oral every 6 hours PRN Agitation/Anxiety  LORazepam   Injectable 2 milliGRAM(s) IntraMuscular every 6 hours PRN Agitation  ondansetron Injectable 4 milliGRAM(s) IV Push every 8 hours PRN Nausea and/or Vomiting  
MEDICATIONS  (STANDING):  atorvastatin 10 milliGRAM(s) Oral at bedtime  bacitracin   Ointment 1 Application(s) Topical daily  clonazePAM  Tablet 1 milliGRAM(s) Oral daily  dextrose 50% Injectable 25 Gram(s) IV Push once  dextrose 50% Injectable 12.5 Gram(s) IV Push once  dextrose 50% Injectable 25 Gram(s) IV Push once  divalproex DR 1500 milliGRAM(s) Oral at bedtime  enoxaparin Injectable 40 milliGRAM(s) SubCutaneous every 12 hours  ferrous    sulfate 325 milliGRAM(s) Oral at bedtime  gabapentin 400 milliGRAM(s) Oral two times a day  glucagon  Injectable 1 milliGRAM(s) IntraMuscular once  guanFACINE 1 milliGRAM(s) Oral at bedtime  insulin glargine Injectable (LANTUS) 15 Unit(s) SubCutaneous at bedtime  insulin lispro (ADMELOG) corrective regimen sliding scale   SubCutaneous three times a day before meals  insulin lispro (ADMELOG) corrective regimen sliding scale   SubCutaneous at bedtime  insulin lispro Injectable (ADMELOG) 5 Unit(s) SubCutaneous three times a day before meals  levothyroxine 50 MICROGram(s) Oral daily  loratadine 10 milliGRAM(s) Oral daily  melatonin 3 milliGRAM(s) Oral at bedtime  mirtazapine 30 milliGRAM(s) Oral at bedtime  nystatin Powder 1 Application(s) Topical two times a day  pantoprazole    Tablet 40 milliGRAM(s) Oral before breakfast  risperiDONE   Tablet 2 milliGRAM(s) Oral <User Schedule>  risperiDONE   Tablet 4 milliGRAM(s) Oral <User Schedule>  senna 2 Tablet(s) Oral at bedtime  tamsulosin 0.4 milliGRAM(s) Oral at bedtime    MEDICATIONS  (PRN):  acetaminophen     Tablet .. 650 milliGRAM(s) Oral every 6 hours PRN Temp greater or equal to 38C (100.4F), Mild Pain (1 - 3)  chlorproMAZINE    Injectable 50 milliGRAM(s) IntraMuscular every 6 hours PRN agitation  chlorproMAZINE    Tablet 50 milliGRAM(s) Oral every 6 hours PRN Agitation  dextrose Oral Gel 15 Gram(s) Oral once PRN Blood Glucose LESS THAN 70 milliGRAM(s)/deciliter  guaiFENesin Oral Liquid (Sugar-Free) 100 milliGRAM(s) Oral every 6 hours PRN Cough  LORazepam     Tablet 2 milliGRAM(s) Oral every 6 hours PRN Agitation/Anxiety  LORazepam   Injectable 2 milliGRAM(s) IntraMuscular every 6 hours PRN Agitation  ondansetron Injectable 4 milliGRAM(s) IV Push every 8 hours PRN Nausea and/or Vomiting  
MEDICATIONS  (STANDING):  atorvastatin 10 milliGRAM(s) Oral at bedtime  bacitracin   Ointment 1 Application(s) Topical daily  clonazePAM  Tablet 1 milliGRAM(s) Oral daily  dextrose 5%. 1000 milliLiter(s) (100 mL/Hr) IV Continuous <Continuous>  dextrose 5%. 1000 milliLiter(s) (50 mL/Hr) IV Continuous <Continuous>  dextrose 50% Injectable 25 Gram(s) IV Push once  dextrose 50% Injectable 12.5 Gram(s) IV Push once  dextrose 50% Injectable 25 Gram(s) IV Push once  divalproex DR 1500 milliGRAM(s) Oral at bedtime  enoxaparin Injectable 40 milliGRAM(s) SubCutaneous every 12 hours  ferrous    sulfate 325 milliGRAM(s) Oral at bedtime  gabapentin 400 milliGRAM(s) Oral two times a day  glucagon  Injectable 1 milliGRAM(s) IntraMuscular once  guanFACINE 1 milliGRAM(s) Oral at bedtime  insulin lispro (ADMELOG) corrective regimen sliding scale   SubCutaneous at bedtime  insulin lispro (ADMELOG) corrective regimen sliding scale   SubCutaneous three times a day before meals  levothyroxine 50 MICROGram(s) Oral daily  loratadine 10 milliGRAM(s) Oral daily  melatonin 3 milliGRAM(s) Oral at bedtime  mirtazapine 30 milliGRAM(s) Oral at bedtime  pantoprazole    Tablet 40 milliGRAM(s) Oral before breakfast  piperacillin/tazobactam IVPB.. 3.375 Gram(s) IV Intermittent every 8 hours  risperiDONE   Tablet 4 milliGRAM(s) Oral two times a day  senna 2 Tablet(s) Oral at bedtime  tamsulosin 0.4 milliGRAM(s) Oral at bedtime    MEDICATIONS  (PRN):  acetaminophen     Tablet .. 650 milliGRAM(s) Oral every 6 hours PRN Temp greater or equal to 38C (100.4F), Mild Pain (1 - 3)  chlorproMAZINE    Injectable 50 milliGRAM(s) IntraMuscular every 6 hours PRN agitation  chlorproMAZINE    Tablet 50 milliGRAM(s) Oral every 6 hours PRN Agitation  dextrose Oral Gel 15 Gram(s) Oral once PRN Blood Glucose LESS THAN 70 milliGRAM(s)/deciliter  guaiFENesin Oral Liquid (Sugar-Free) 100 milliGRAM(s) Oral every 6 hours PRN Cough  LORazepam     Tablet 2 milliGRAM(s) Oral every 6 hours PRN Agitation/Anxiety  LORazepam   Injectable 2 milliGRAM(s) IntraMuscular every 6 hours PRN Agitation  ondansetron Injectable 4 milliGRAM(s) IV Push every 8 hours PRN Nausea and/or Vomiting  
MEDICATIONS  (STANDING):  atorvastatin 10 milliGRAM(s) Oral at bedtime  bacitracin   Ointment 1 Application(s) Topical daily  dextrose 50% Injectable 25 Gram(s) IV Push once  dextrose 50% Injectable 12.5 Gram(s) IV Push once  dextrose 50% Injectable 25 Gram(s) IV Push once  divalproex DR 1500 milliGRAM(s) Oral at bedtime  enoxaparin Injectable 40 milliGRAM(s) SubCutaneous every 12 hours  ferrous    sulfate 325 milliGRAM(s) Oral at bedtime  gabapentin 400 milliGRAM(s) Oral two times a day  glucagon  Injectable 1 milliGRAM(s) IntraMuscular once  guanFACINE 1 milliGRAM(s) Oral at bedtime  insulin glargine Injectable (LANTUS) 15 Unit(s) SubCutaneous at bedtime  insulin lispro (ADMELOG) corrective regimen sliding scale   SubCutaneous three times a day before meals  insulin lispro (ADMELOG) corrective regimen sliding scale   SubCutaneous at bedtime  insulin lispro Injectable (ADMELOG) 5 Unit(s) SubCutaneous three times a day before meals  levothyroxine 50 MICROGram(s) Oral daily  loratadine 10 milliGRAM(s) Oral daily  melatonin 3 milliGRAM(s) Oral at bedtime  mirtazapine 30 milliGRAM(s) Oral at bedtime  pantoprazole    Tablet 40 milliGRAM(s) Oral before breakfast  risperiDONE   Tablet 4 milliGRAM(s) Oral two times a day  senna 2 Tablet(s) Oral at bedtime  tamsulosin 0.4 milliGRAM(s) Oral at bedtime    MEDICATIONS  (PRN):  acetaminophen     Tablet .. 650 milliGRAM(s) Oral every 6 hours PRN Temp greater or equal to 38C (100.4F), Mild Pain (1 - 3)  chlorproMAZINE    Injectable 50 milliGRAM(s) IntraMuscular every 6 hours PRN agitation  chlorproMAZINE    Tablet 50 milliGRAM(s) Oral every 6 hours PRN Agitation  dextrose Oral Gel 15 Gram(s) Oral once PRN Blood Glucose LESS THAN 70 milliGRAM(s)/deciliter  guaiFENesin Oral Liquid (Sugar-Free) 100 milliGRAM(s) Oral every 6 hours PRN Cough  LORazepam     Tablet 2 milliGRAM(s) Oral every 6 hours PRN Agitation/Anxiety  LORazepam   Injectable 2 milliGRAM(s) IntraMuscular every 6 hours PRN Agitation  ondansetron Injectable 4 milliGRAM(s) IV Push every 8 hours PRN Nausea and/or Vomiting

## 2023-07-13 NOTE — PROVIDER CONTACT NOTE (OTHER) - BACKGROUND
33yr old male admitted with PNA. Hx of autism, asthma, obesity, GERD, HLD, BPH, Hypothyroid.
33yr old male admitted with PNA. Hx of autism, asthma, obesity, GERD, HLD, BPH, Hypothyroid.
Admitted for pneumonia.
Autism, Intellectual disability
admitted for pneumonia
33yr old male admitted with PNA. Hx of autism, asthma, obesity, GERD, HLD, BPH, Hypothyroid.
33M w/ PMHx of intellectual disability, obesity, asthma, autism, impulse control disorder presented from [facility] complaining of chest pain and tachycardia admitted with pneumonia.
Autism, Pneumonia
admitted for pneumonia
33M w/ PMHx of intellectual disability, autism, impulse control disorder, HLD presenting from [facility] for chest pain/tachycardia admitted for Pneumonia
33yr old male admitted with PNA. Hx of impulse control disorder, asthma, obesity, hypothyroid, HTN.
33yr old male admitted with PNA. Hx of autism, asthma, obesity, GERD, HLD, BPH, Hypothyroid.
Autism, Pneumonia
33yr old male admitted with PNA. Hx of autism, asthma, obesity, GERD, HLD, BPH, Hypothyroid.
Admitted for pneumonia.

## 2023-07-13 NOTE — BH CONSULTATION LIAISON PROGRESS NOTE - NSBHFUPINTERVALHXFT_PSY_A_CORE
Patient was seen and assessed at beside. Patient noted to be sleeping on arrival, wakes to voice. patient is calm, no PRNs required. Patient states he has no SI or HI and smiles when he respond to this question. Patient also denies AH and VH.  PCA at bedside reports patient removed wound cover himself this am without medical instruction, otherwise no events.  Patient requests transfer to [facility], aware waiting for open bed. 
Chart reviewed. Patient seen this AM. Found to be buying food on his computer.  Continues to deny SI/AH and does not want to go back to [facility]. However collateral from mother is extremely concerning that patient will say the right thing in the setting of getting his money (which he got today and is already using). 
Patient was seen and assessed at beside. Patient is resting, calm, cooperative, limited engagement after patient told of no bed availability today. Disinterested in speaking with provider.   Patient without need for PRNS  Denies AH and VH  no SI or HI reported
Chart reviewed, refused am standing Risperidone, Code Hiren called last night d/t agitation, no prn's given patient redirectable.  Patient seen, sitting on side of bed staring at his un-touched food tray complaining he did not like the food, refused any intervention by staff, patient irritable today with poor eye contact, frequently stating he does not want to go to [facility], spewing expletives about his mother, refused to engage with writer including refusing to answer any questions.    No beds available at present per Ninfa.
Chart reviewed. Patient taking AM risperidone. Says he feels ok. Denies AH at this time. Minimizes recent AH and recent self injurious gesture that he had late last week. Denies SI currently. Otherwise no complaints.
Patient was seen and assessed at beside. Today patient is presenting as calm and cooperative, talking about his lunch and his excitement over his order. patient accepting his transfer back to [facility] at this time, aware transfer may occur today or in next few days. Patient denies SI and HI. Denies AH and VH. As per staff patient is unpredictable, can be irritable and loud. 
Chart reviewed. No prn's given. Patient seen, a&o, sitting in bedside chair, calm, cooperative, states, "I am not suicidal, I don't want to go back to [facility]," denies auditory hallucinations, states, "I haven't heard them since I have been hear.     Collateral obtained from patient's mother, Sharri, who endorses safety concerns, she states patient should return to [facility] to complete treatment including medication management, she states patient is being manipulative, "he wants to go back to the group home to get his money to go shopping, he knows his money comes in on the 5th, he is very obsessed with money and will spend it all," she further states after he gets his money, "he will go back to hearing voices and is always trying to hurt himself." 
Chart reviewed. Patient was seen and assessed at bedside. Refused am medication today.   Patient told of his transfer - when he was told he states "I want to be at [facility] I might hurt myself again" and points to his abdomen. However, when asked about thoughts of SI/HI patient denies.   Denies ah and vh on exam. no rigidity or restlessness noted today.   Childlike.

## 2023-07-13 NOTE — CHART NOTE - NSCHARTNOTEFT_GEN_A_CORE
Patient seen and examined. Please see discharge note for full physical examination and discharge details. Patient is stable for discharge today.     Milagros Bashir MD  Hospitalist  Pager #: [phone]

## 2023-07-13 NOTE — PROVIDER CONTACT NOTE (OTHER) - ASSESSMENT
A&X3 refusing labs, vitals and meds and morning FS. Pt becomes frustrated and agitated when educating on importance of compliance. ANM educated patient with no success. Pt uncooperative at this time.
A&X3 refusing lovenox
Patient A&Ox3, calm and pacing in hallway with 1:1 by arms length. Patient verbalizing concerns and is upset regarding returning to his residence and that's why patient has been hurting himself. Patients belongings were checked by RN and supervisor and belongings removed from patients bedside for safety.
Pt pulled out IV stating he is going home and refused vitals
VSS, pt SAM.OX4, RA.
pt getting agitated, says they put old iv via ultrasound
A&OX3 no s/s of acute distress
VSS, was aggressive when asked to wake up to take his morning meds at 0600. Refused to wake up from deep sleep.
A&X3 refusing labs, vitals and meds. Pt continues to become agitated following multiple attempts to reeducate on importance of glucose monitoring
Pt complaints of testicle pain 8/10. Motrin given with no relief.
VSS, pt lying in bed with PCA at bedside
A&X3 refusing labs, vitals and meds. Pt became agitated following multiple requests and states "I have the right to refuse". Pt has no s/s of distress on RA.
Patient was educated multiple times regarding the importance of following medication regimen and importance of FS. Patient abusing the staff and states "not to bother him and leave him alone".
Patient A&Ox3-4, patient calm and cooperative and providing history of his testicle cancer. Patient also complaining of urinary discomfort/blood in his urine when he urinates. Tylenol administered for pain however was not quite effective as per patient.
Patient AXO3-4. No s/s of distress noticed.

## 2023-07-13 NOTE — PROVIDER CONTACT NOTE (OTHER) - ACTION/TREATMENT ORDERED:
EAMON Mosqueda aware and notified. Ordered to reschedule Zosyn, Risperidone, and Levothyroxine for the next shift. Safety maintained.
Renée Keen notified. Will continue to educate the pt regarding the importance of morning labs. Safety maintained.
EAMON Purvis notified, came to assess patient and ordered UA.
Provider notified
Young Alexander notified. Will continue to reeducate the pt regarding the importance of following medication regimen and importance of FS. Day RN notified. Safety maintained.
ACP Abdirashid notified, will consult with psych team for reconsultation
Provider notified
provider notified
ACP Ilena notified and aware. Told to try again later when less agitated. Safety maintained.
acp notified. IV abx not given.
Provider notified, no new orders at this time.
acp notified
Provider notified
Provider notified, attempt again in one hour.
Provider notified, no new orders at this time. Attempt later during day.

## 2023-07-13 NOTE — BH INPATIENT PSYCHIATRY ASSESSMENT NOTE - NSBHMETABOLIC_PSY_ALL_CORE_FT
BMI: BMI (kg/m2): 40.3 (07-13-23 @ 15:45)  HbA1c: A1C with Estimated Average Glucose Result: 6.7 % (06-24-23 @ 06:10)    Glucose: POCT Blood Glucose.: 172 mg/dL (07-12-23 @ 21:17)    BP: --  Lipid Panel: Date/Time: 06-24-23 @ 06:10  Cholesterol, Serum: 118  Direct LDL: --  HDL Cholesterol, Serum: 25  Total Cholesterol/HDL Ration Measurement: --  Triglycerides, Serum: 155

## 2023-07-13 NOTE — BH PATIENT PROFILE - FALL HARM RISK - UNIVERSAL INTERVENTIONS
yesterday
Bed in lowest position, wheels locked, appropriate side rails in place/Call bell, personal items and telephone in reach/Instruct patient to call for assistance before getting out of bed or chair/Non-slip footwear when patient is out of bed/[place] to call system/Physically safe environment - no spills, clutter or unnecessary equipment/Purposeful Proactive Rounding/Room/bathroom lighting operational, light cord in reach

## 2023-07-13 NOTE — PROVIDER CONTACT NOTE (OTHER) - RECOMMENDATIONS
Provider notified
Psych consult, continue 1:1
acp notified
ACP notified and aware
Assess urine output and clarity of urine
Renée Keen notified. No recommendations made at this time.
Provider notified
notify provider
Provider notified
Young Alexander notified and came to the floor.
acp notified

## 2023-07-13 NOTE — BH CONSULTATION LIAISON PROGRESS NOTE - NSBHCONSULTMEDAGITATION_PSY_A_CORE FT
Thorazine 50 mg PO/IM q6h ; Ativan 2 mg PO/IM/IV q6h for agitation

## 2023-07-13 NOTE — PROVIDER CONTACT NOTE (OTHER) - REASON
Pain
Pt continues refusing AM labs, vitals and meds at this time following multiple attempts to reeducate
EKG performed this morning QTC less than 500. risperidone administered
No IV acess
Patient refusing morning labs.
patient refused lovenox
Pt refusing AM VS, medication and FS at this time
patient complaining of pain to his testicle/urinary discomfort
IV & Vitals
Patient refused all morning meds and fingerstick. Pt scheduled for 5 units of standing insulin.
Patient verbalizing self harm
Pt refused morning labs
Pt refused morning meds
Pt refused AM FS
unable to get IV after proper chain of command

## 2023-07-13 NOTE — DISCHARGE NOTE NURSING/CASE MANAGEMENT/SOCIAL WORK - NSDCVIVACCINE_GEN_ALL_CORE_FT
Tdap; 20-Dec-2018 12:21; Lo Anaya (RN); [employer]; [id number] (Exp. Date: 02-Nov-2020); IntraMuscular; Deltoid Left.; 0.5 milliLiter(s); VIS (VIS Published: 09-May-2013, VIS Presented: 20-Dec-2018);   Tdap; 26-Mar-2019 18:59; Shavon Barrios (RN); [employer]; [id number] (Exp. Date: 26-Feb-2021); IntraMuscular; Deltoid Left.; 0.5 milliLiter(s); VIS (VIS Published: 09-May-2013, VIS Presented: 26-Mar-2019);   Tdap; 01-Dec-2021 09:35; Zamzam Coulter (RN); [employer]; [id number] (Exp. Date: 09-Sep-2023); IntraMuscular; Deltoid Left.; 0.5 milliLiter(s); VIS (VIS Published: 09-May-2013, VIS Presented: 01-Dec-2021);   Tdap; 21-Jul-2022 01:28; Rose Hancock (RN); [employer]; [id number] (Exp. Date: 14-Oct-2024); IntraMuscular; Deltoid Right.; 0.5 milliLiter(s); VIS (VIS Published: 09-May-2013, VIS Presented: 21-Jul-2022);   Tdap; 12-Jun-2023 21:01; Diann Paul (RN); [employer]; [id number] (Exp. Date: 08-Mar-2025); IntraMuscular; Deltoid Left.; 0.5 milliLiter(s); VIS (VIS Published: 09-May-2013, VIS Presented: 12-Jun-2023);

## 2023-07-13 NOTE — BH INPATIENT PSYCHIATRY ASSESSMENT NOTE - PAST PSYCHOTROPIC MEDICATION
Zyprexa, Haldol, Seroquel, Trilafon, Abilify  Lithium, Tegretol, Trileptal, Topamax  Prozac, Zoloft, Trazodone

## 2023-07-13 NOTE — BH CONSULTATION LIAISON PROGRESS NOTE - NSBHCHARTREVIEWVS_PSY_A_CORE FT
Vital Signs Last 24 Hrs  T(C): 37.2 (05 Jul 2023 22:04), Max: 37.2 (05 Jul 2023 18:27)  T(F): 98.9 (05 Jul 2023 22:04), Max: 98.9 (05 Jul 2023 18:27)  HR: 93 (05 Jul 2023 22:04) (93 - 99)  BP: 126/89 (05 Jul 2023 22:04) (114/63 - 126/89)  BP(mean): --  RR: 19 (05 Jul 2023 22:04) (18 - 19)  SpO2: 96% (05 Jul 2023 22:04) (96% - 96%)    Parameters below as of 05 Jul 2023 22:04  Patient On (Oxygen Delivery Method): room air    
Vital Signs Last 24 Hrs  T(C): 36.3 (03 Jul 2023 06:30), Max: 36.8 (02 Jul 2023 21:00)  T(F): 97.4 (03 Jul 2023 06:30), Max: 98.2 (02 Jul 2023 21:00)  HR: 82 (03 Jul 2023 06:30) (82 - 102)  BP: 143/85 (03 Jul 2023 06:30) (127/87 - 144/86)  BP(mean): --  RR: 16 (03 Jul 2023 10:00) (16 - 18)  SpO2: 96% (03 Jul 2023 10:00) (96% - 100%)    Parameters below as of 03 Jul 2023 10:00  Patient On (Oxygen Delivery Method): room air    
Vital Signs Last 24 Hrs  T(C): 36.4 (12 Jul 2023 06:20), Max: 36.9 (11 Jul 2023 18:10)  T(F): 97.5 (12 Jul 2023 06:20), Max: 98.4 (11 Jul 2023 18:10)  HR: 81 (12 Jul 2023 06:20) (80 - 85)  BP: 128/87 (12 Jul 2023 06:20) (111/77 - 128/87)  BP(mean): --  RR: 18 (12 Jul 2023 06:20) (18 - 18)  SpO2: 96% (12 Jul 2023 06:20) (96% - 100%)    Parameters below as of 12 Jul 2023 06:20  Patient On (Oxygen Delivery Method): room air    
Vital Signs Last 24 Hrs  T(C): 36.9 (06 Jul 2023 21:07), Max: 36.9 (06 Jul 2023 21:07)  T(F): 98.5 (06 Jul 2023 21:07), Max: 98.5 (06 Jul 2023 21:07)  HR: 91 (06 Jul 2023 21:07) (91 - 91)  BP: 124/89 (06 Jul 2023 21:07) (124/89 - 124/89)  BP(mean): --  RR: 17 (06 Jul 2023 21:07) (17 - 17)  SpO2: 99% (06 Jul 2023 21:07) (99% - 99%)    Parameters below as of 06 Jul 2023 21:07  Patient On (Oxygen Delivery Method): room air    
Vital Signs Last 24 Hrs  T(C): 36.7 (05 Jul 2023 09:05), Max: 36.8 (05 Jul 2023 05:00)  T(F): 98.1 (05 Jul 2023 09:05), Max: 98.2 (05 Jul 2023 05:00)  HR: 92 (05 Jul 2023 09:05) (81 - 97)  BP: 136/80 (05 Jul 2023 05:00) (118/57 - 136/80)  BP(mean): --  RR: 20 (05 Jul 2023 09:05) (20 - 20)  SpO2: 95% (05 Jul 2023 09:05) (94% - 97%)    Parameters below as of 05 Jul 2023 09:05  Patient On (Oxygen Delivery Method): room air    
Vital Signs Last 24 Hrs  T(C): 36.4 (10 Jul 2023 05:41), Max: 37.3 (09 Jul 2023 11:42)  T(F): 97.5 (10 Jul 2023 05:41), Max: 99.2 (09 Jul 2023 11:42)  HR: 89 (10 Jul 2023 05:41) (79 - 95)  BP: 112/74 (10 Jul 2023 05:41) (112/74 - 130/79)  BP(mean): --  RR: 18 (10 Jul 2023 05:41) (18 - 19)  SpO2: 97% (10 Jul 2023 05:41) (96% - 100%)    Parameters below as of 10 Jul 2023 05:41  Patient On (Oxygen Delivery Method): room air    
Vital Signs Last 24 Hrs  T(C): 36.4 (13 Jul 2023 06:10), Max: 36.6 (12 Jul 2023 21:15)  T(F): 97.6 (13 Jul 2023 06:10), Max: 97.8 (12 Jul 2023 21:15)  HR: 75 (13 Jul 2023 06:10) (71 - 75)  BP: 107/69 (13 Jul 2023 06:10) (107/69 - 118/83)  BP(mean): --  RR: 18 (13 Jul 2023 06:10) (18 - 18)  SpO2: 96% (13 Jul 2023 06:10) (95% - 96%)    Parameters below as of 13 Jul 2023 06:10  Patient On (Oxygen Delivery Method): room air    
Vital Signs Last 24 Hrs  T(C): 37 (11 Jul 2023 08:30), Max: 37 (11 Jul 2023 08:30)  T(F): 98.6 (11 Jul 2023 08:30), Max: 98.6 (11 Jul 2023 08:30)  HR: 86 (11 Jul 2023 08:30) (76 - 90)  BP: 122/83 (11 Jul 2023 08:30) (109/72 - 129/96)  BP(mean): --  RR: 20 (11 Jul 2023 08:30) (18 - 20)  SpO2: 100% (11 Jul 2023 08:30) (96% - 100%)    Parameters below as of 11 Jul 2023 08:30  Patient On (Oxygen Delivery Method): room air

## 2023-07-13 NOTE — BH CONSULTATION LIAISON PROGRESS NOTE - NSBHFUPINTERVALCCFT_PSY_A_CORE
Patient states, "I don't want to go back to [facility]."
resting, calm, no PRNs
Patient states, "I want to leave."
I am ok
Patient states, "I don't want to go back to [facility]."
resting, calm, no PRNs
patient calm, talking about lunch
"I need clothes before I go- I want to be at [facility] I might hurt myself again"
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
## 2023-07-13 NOTE — BH INPATIENT PSYCHIATRY ASSESSMENT NOTE - NSBHCHARTREVIEWVS_PSY_A_CORE FT
Vital Signs Last 24 Hrs  T(C): 36.1 (07-13-23 @ 15:45), Max: 36.6 (07-12-23 @ 21:15)  T(F): 97 (07-13-23 @ 15:45), Max: 97.8 (07-12-23 @ 21:15)  HR: 95 (07-13-23 @ 13:35) (71 - 95)  BP: 127/95 (07-13-23 @ 13:35) (107/69 - 127/95)  BP(mean): --  RR: 18 (07-13-23 @ 15:45) (18 - 18)  SpO2: 97% (07-13-23 @ 13:35) (95% - 97%)    Orthostatic VS  07-13-23 @ 15:45  Lying BP: --/-- HR: --  Sitting BP: 127/57 HR: 87  Standing BP: 128/72 HR: 98  Site: upper right arm  Mode: electronic   Vital Signs Last 24 Hrs  T(C): 36 (07-13-23 @ 19:27), Max: 36.6 (07-12-23 @ 21:15)  T(F): 96.8 (07-13-23 @ 19:27), Max: 97.8 (07-12-23 @ 21:15)  HR: 95 (07-13-23 @ 13:35) (71 - 95)  BP: 127/95 (07-13-23 @ 13:35) (107/69 - 127/95)  BP(mean): --  RR: 18 (07-13-23 @ 15:45) (18 - 18)  SpO2: 97% (07-13-23 @ 13:35) (95% - 97%)    Orthostatic VS  07-13-23 @ 19:27  Lying BP: --/-- HR: --  Sitting BP: 138/85 HR: 99  Standing BP: 103/55 HR: 112  Site: --  Mode: --  Orthostatic VS  07-13-23 @ 15:45  Lying BP: --/-- HR: --  Sitting BP: 127/57 HR: 87  Standing BP: 128/72 HR: 98  Site: upper right arm  Mode: electronic   Vital Signs Last 24 Hrs  T(C): 36.4 (07-14-23 @ 08:16), Max: 36.5 (07-13-23 @ 13:35)  T(F): 97.5 (07-14-23 @ 08:16), Max: 97.7 (07-13-23 @ 13:35)  HR: 95 (07-13-23 @ 13:35) (95 - 95)  BP: 127/95 (07-13-23 @ 13:35) (127/95 - 127/95)  BP(mean): --  RR: 17 (07-13-23 @ 21:32) (17 - 18)  SpO2: 97% (07-13-23 @ 13:35) (97% - 97%)    Orthostatic VS  07-14-23 @ 08:16  Lying BP: --/-- HR: --  Sitting BP: 85/55 HR: 72  Standing BP: 98/60 HR: 99  Site: --  Mode: electronic  Orthostatic VS  07-13-23 @ 19:27  Lying BP: --/-- HR: --  Sitting BP: 138/85 HR: 99  Standing BP: 103/55 HR: 112  Site: --  Mode: --  Orthostatic VS  07-13-23 @ 15:45  Lying BP: --/-- HR: --  Sitting BP: 127/57 HR: 87  Standing BP: 128/72 HR: 98  Site: upper right arm  Mode: electronic   Vital Signs Last 24 Hrs  T(C): 36.4 (07-14-23 @ 08:16), Max: 36.4 (07-14-23 @ 08:16)  T(F): 97.5 (07-14-23 @ 08:16), Max: 97.5 (07-14-23 @ 08:16)  HR: --  BP: --  BP(mean): --  RR: 17 (07-13-23 @ 21:32) (17 - 18)  SpO2: --    Orthostatic VS  07-14-23 @ 08:16  Lying BP: --/-- HR: --  Sitting BP: 85/55 HR: 72  Standing BP: 98/60 HR: 99  Site: --  Mode: electronic  Orthostatic VS  07-13-23 @ 19:27  Lying BP: --/-- HR: --  Sitting BP: 138/85 HR: 99  Standing BP: 103/55 HR: 112  Site: --  Mode: --  Orthostatic VS  07-13-23 @ 15:45  Lying BP: --/-- HR: --  Sitting BP: 127/57 HR: 87  Standing BP: 128/72 HR: 98  Site: upper right arm  Mode: electronic

## 2023-07-13 NOTE — BH INPATIENT PSYCHIATRY ASSESSMENT NOTE - CURRENT MEDICATION
MEDICATIONS  (STANDING):    MEDICATIONS  (PRN):   MEDICATIONS  (STANDING):  clonazePAM  Tablet 0.5 milliGRAM(s) Oral two times a day  divalproex DR 1000 milliGRAM(s) Oral at bedtime  gabapentin 400 milliGRAM(s) Oral three times a day  guanFACINE. 1 milliGRAM(s) Oral at bedtime  mirtazapine 30 milliGRAM(s) Oral at bedtime  risperiDONE   Tablet 4 milliGRAM(s) Oral at bedtime    MEDICATIONS  (PRN):  chlorproMAZINE    Injectable 50 milliGRAM(s) IntraMuscular once PRN severe agitation  chlorproMAZINE    Tablet 50 milliGRAM(s) Oral every 6 hours PRN agitation  diphenhydrAMINE 50 milliGRAM(s) Oral every 6 hours PRN EPS ppx.  diphenhydrAMINE Injectable 50 milliGRAM(s) IntraMuscular once PRN EPS ppx.   MEDICATIONS  (STANDING):  clonazePAM  Tablet 0.5 milliGRAM(s) Oral two times a day  divalproex DR 1000 milliGRAM(s) Oral at bedtime  gabapentin 400 milliGRAM(s) Oral three times a day  guanFACINE. 1 milliGRAM(s) Oral at bedtime  mirtazapine 30 milliGRAM(s) Oral at bedtime  risperiDONE   Tablet 4 milliGRAM(s) Oral at bedtime    MEDICATIONS  (PRN):  chlorproMAZINE    Injectable 50 milliGRAM(s) IntraMuscular once PRN severe agitation  chlorproMAZINE    Tablet 50 milliGRAM(s) Oral every 6 hours PRN agitation  diphenhydrAMINE 50 milliGRAM(s) Oral every 6 hours PRN EPS ppx.  diphenhydrAMINE Injectable 50 milliGRAM(s) IntraMuscular once PRN EPS ppx.  LORazepam     Tablet 1 milliGRAM(s) Oral every 6 hours PRN anxiety   MEDICATIONS  (STANDING):  clonazePAM  Tablet 0.5 milliGRAM(s) Oral two times a day  divalproex  milliGRAM(s) Oral daily  divalproex DR 1000 milliGRAM(s) Oral at bedtime  gabapentin 400 milliGRAM(s) Oral three times a day  guanFACINE. 1 milliGRAM(s) Oral at bedtime  mirtazapine 30 milliGRAM(s) Oral at bedtime  risperiDONE   Tablet 3 milliGRAM(s) Oral daily  risperiDONE   Tablet 4 milliGRAM(s) Oral at bedtime    MEDICATIONS  (PRN):  chlorproMAZINE    Injectable 50 milliGRAM(s) IntraMuscular once PRN severe agitation  chlorproMAZINE    Tablet 50 milliGRAM(s) Oral every 6 hours PRN agitation  diphenhydrAMINE 50 milliGRAM(s) Oral every 6 hours PRN EPS ppx.  diphenhydrAMINE Injectable 50 milliGRAM(s) IntraMuscular once PRN EPS ppx.  LORazepam     Tablet 1 milliGRAM(s) Oral every 6 hours PRN anxiety   MEDICATIONS  (STANDING):  atorvastatin 10 milliGRAM(s) Oral at bedtime  clonazePAM  Tablet 0.5 milliGRAM(s) Oral two times a day  divalproex  milliGRAM(s) Oral daily  divalproex DR 1000 milliGRAM(s) Oral at bedtime  gabapentin 400 milliGRAM(s) Oral three times a day  guanFACINE. 1 milliGRAM(s) Oral at bedtime  insulin glargine Injectable (LANTUS) 15 Unit(s) SubCutaneous at bedtime  insulin lispro (ADMELOG) corrective regimen sliding scale   SubCutaneous at bedtime  insulin lispro (ADMELOG) corrective regimen sliding scale   SubCutaneous three times a day before meals  levothyroxine 50 MICROGram(s) Oral daily  loratadine 10 milliGRAM(s) Oral daily  metFORMIN 1000 milliGRAM(s) Oral two times a day with meals  mirtazapine 30 milliGRAM(s) Oral at bedtime  pantoprazole    Tablet 40 milliGRAM(s) Oral before breakfast  risperiDONE   Tablet 3 milliGRAM(s) Oral daily  risperiDONE   Tablet 4 milliGRAM(s) Oral at bedtime  senna 2 Tablet(s) Oral at bedtime  tamsulosin 0.4 milliGRAM(s) Oral at bedtime    MEDICATIONS  (PRN):  chlorproMAZINE    Injectable 50 milliGRAM(s) IntraMuscular once PRN severe agitation  chlorproMAZINE    Tablet 50 milliGRAM(s) Oral every 6 hours PRN agitation  diphenhydrAMINE 50 milliGRAM(s) Oral every 6 hours PRN EPS ppx.  diphenhydrAMINE Injectable 50 milliGRAM(s) IntraMuscular once PRN EPS ppx.  LORazepam     Tablet 1 milliGRAM(s) Oral every 6 hours PRN anxiety

## 2023-07-13 NOTE — BH CONSULTATION LIAISON PROGRESS NOTE - NSBHATTESTTYPEVISIT_PSY_A_CORE
MASHA without on-site Attending supervision
Attending Only
Attending Only

## 2023-07-13 NOTE — BH CONSULTATION LIAISON PROGRESS NOTE - NSBHHPIREASONCLOTHER_PSY_A_CORE FT
regine transfer

## 2023-07-13 NOTE — PROVIDER CONTACT NOTE (OTHER) - SITUATION
unable to get IV access after 2 RNs tries, previously removed as pt was compaining of pain and site.
Pt complaints of testicle pain 8/10. Motrin given with no relief.
Pt continues refusing AM labs, vitals and meds at this time following multiple attempts to reeducate
Pt refused to take morning meds on time
Patient AXO3-4. Patient refusing morning labs. Multiple attempts made to educate the pt regarding the importance of morning labs. However pt still refusing.
Patient agitated and abusive to staff. Refused all morning meds and fingerstick.
Pt refused morning labs, was agitated and did not want blood drawn
Pt refused AM FS
Patient complaining of pain to his testicle; pain rated 4-5 out of 10. Patient states he has blood when he urinates and states he has had testicular cancer in the past.
Pt refusing AM VS, medication and FS at this time
after following proper chain of command, called Nurse educator at night for IV, unable to get access.  also called  at night, no ANM on unit.
patient refused lovenox
EKG performed this morning QTC less than 500. risperidone administered
Patient verbalized self harm, stated "I am hurting myself because I get upset" Patient showed red marks on his B/L arms and right lower abdomen; pink foam were placed over them.
Pt pulled out IV stating he is going home and refused vitals

## 2023-07-13 NOTE — BH CONSULTATION LIAISON PROGRESS NOTE - MSE UNSTRUCTURED FT
Mental Status Exam:  Lucien: well groomed, fair hygiene     Behavior: calm during interview, can vary   Psychomotor: no tremors noted;no restlessness  today   Gait: WNL  Speech: speech impediment, otherwise WNL   Mood: easily irritable, calm at this time   Affect: constricted  Thought process: limited  Thought Content: limited  Perception: none  SI: none  HI: none  Insight: limited  Judgment: fair at this moment  Impulsivity: impaired- spending money on sending sandwiches to the hospital    Cognitive Exam:  Orientation: alert  Recall: intact  Attention: intact  
Mental Status Exam:  Lucien: well groomed, fair hygiene     Behavior: calm during interview, can vary   Psychomotor: no tremors noted  Gait: WNL  Speech: speech impediment, otherwise WNL   Mood: easily irritable, calm at this time - sleeping   Affect: constricted  Thought process: limited  Thought Content: limited  Perception: none  SI: none  HI: none  Insight: limited  Judgment: fair at this moment  Impulsivity: impaired- spending money on sending sandwiches to the hospital    Cognitive Exam:  Orientation: alert  Recall: intact  Attention: intact  
Mental Status Exam:  Lucien: well groomed, fair hygiene     Behavior: calm, cooperative, no psychomotor retardation/agitation  Motor: no tremors, EPS  Gait: did not assess, sitting in bedside chair  Speech: speech impediment, otherwise, normal rate, rhythm, prosody and volume   Mood: "good"  Affect: euthymic, congruent  Thought process: perseverating on going back to group home  Thought Content: denies paranoia, delusions   Perception: denies AH/VH  SI: denies  HI: denies  Insight: limited  Judgment: fair at this moment    Cognitive Exam:  Orientation: AOx3  Recall: intact  Attention: intact  
Mental Status Exam:  Lucien: well groomed, fair hygiene     Behavior: calm, superfically cooperative, no psychomotor retardation/agitation  Motor: no tremors, EPS  Gait: did not assess, sitting in bedside chair  Speech: speech impediment, otherwise, normal rate, rhythm, prosody and volume   Mood: "good"  Affect: euthymic, congruent  Thought process: perseverating on going back to group home  Thought Content: denies paranoia, delusions   Perception: denies AH/VH  SI: denies  HI: denies  Insight: limited  Judgment: fair at this moment  Impulsivity: impaired- spending money on sending sandwiches to the hospital    Cognitive Exam:  Orientation: AOx3  Recall: intact  Attention: intact  
Mental Status Exam:  Lucien: well groomed, fair hygiene     Behavior: defiant, refused to engage  Psychomotor: no tremors noted  Gait: did not assess, sitting on side of bed  Speech: speech impediment, angry, loud tone of voice  Mood: irritable, angry  Affect: constricted  Thought process: perseverating on going back to group home  Thought Content: focused on going back to group home  Perception: refused  SI: refused  HI: refused  Insight: limited  Judgment: fair at this moment  Impulsivity: impaired- spending money on sending sandwiches to the hospital    Cognitive Exam:  Orientation: alert  Recall: intact  Attention: intact  
Mental Status Exam:  Lucien: well groomed, fair hygiene     Behavior: calm during interview, can vary   Psychomotor: no tremors noted  Gait: WNL  Speech: speech impediment, otherwise WNL   Mood: easily irritable, calm at this time - sleeping   Affect: constricted  Thought process: limited  Thought Content: limited  Perception: none  SI: none  HI: none  Insight: limited  Judgment: fair at this moment  Impulsivity: impaired- spending money on sending sandwiches to the hospital    Cognitive Exam:  Orientation: alert  Recall: intact  Attention: intact  
Mental Status Exam:  Lucien: well groomed, fair hygiene     Behavior: calm during interview, can vary   Psychomotor: no tremors noted  Gait: WNL  Speech: speech impediment, otherwise WNL today  Mood: easily irritable, calm at this time   Affect: constricted  Thought process: limited  Thought Content: limited  Perception: none  SI: none  HI: none  Insight: limited  Judgment: fair at this moment  Impulsivity: impaired- spending money on sending sandwiches to the hospital    Cognitive Exam:  Orientation: alert  Recall: intact  Attention: intact  
Mental Status Exam:  Lucien: well groomed, fair hygiene     Behavior: calm during interview, can vary   Psychomotor: no tremors noted; some rocking in the chair  Gait: WNL  Speech: speech impediment, otherwise WNL   Mood: easily irritable, calm at this time - sleeping   Affect: constricted  Thought process: limited  Thought Content: limited  Perception: none  SI: none  HI: none  Insight: limited  Judgment: fair at this moment  Impulsivity: impaired- spending money on sending sandwiches to the hospital    Cognitive Exam:  Orientation: alert  Recall: intact  Attention: intact

## 2023-07-13 NOTE — BH PATIENT PROFILE - HOME MEDICATIONS
nystatin 100,000 units/g topical powder , Apply topically to affected area 2 times a day to b/l  groin & scrotum  insulin glargine 100 units/mL subcutaneous solution , 15 unit(s) subcutaneous once a day (at bedtime)  insulin lispro 100 units/mL injectable solution , 5 unit(s) injectable 3 times a day (with meals)  risperiDONE 4 mg oral tablet , 1 tab(s) orally once a day (at bedtime)  risperiDONE 3 mg oral tablet , 1 tab(s) orally once a day  melatonin 3 mg oral tablet , 1 tab(s) orally once a day (at bedtime)  guanFACINE 1 mg oral tablet , 1 tab(s) orally once a day (at bedtime)  acetaminophen 325 mg oral tablet , 2 tab(s) orally every 6 hours As needed Temp greater or equal to 38C (100.4F), Mild Pain (1 - 3)  clonazePAM 1 mg oral tablet , 1 tab(s) orally once a day  guaiFENesin 100 mg/5 mL oral liquid , 10 milliliter(s) orally every 6 hours As needed Cough  bacitracin 500 units/g topical ointment , 1 Apply topically to affected area 2 times a day  divalproex sodium 500 mg oral delayed release tablet , 3 tab(s) orally once a day (at bedtime)  mirtazapine 30 mg oral tablet , 1 tab(s) orally once a day (at bedtime)  levothyroxine 50 mcg (0.05 mg) oral tablet , 1 tab(s) orally once a day  pantoprazole 40 mg oral delayed release tablet , 1 tab(s) orally once a day  senna leaf extract oral tablet , 2 tab(s) orally once a day (at bedtime)  ferrous sulfate 325 mg (65 mg elemental iron) oral tablet , 1 tab(s) orally once a day (at bedtime)  atorvastatin 10 mg oral tablet , 1 tab(s) orally once a day (at bedtime)  loratadine 10 mg oral tablet , 1 tab(s) orally once a day  metFORMIN 1000 mg oral tablet , 1 tab(s) orally 2 times a day (with meals)  gabapentin 400 mg oral capsule , 1 cap(s) orally 2 times a day  tamsulosin 0.4 mg oral capsule , 1 cap(s) orally once a day (at bedtime)

## 2023-07-13 NOTE — DISCHARGE NOTE NURSING/CASE MANAGEMENT/SOCIAL WORK - PATIENT PORTAL LINK FT
You can access the FollowMyHealth Patient Portal offered by [facility] by registering at the following website: [email or web address]. By joining [facility]’s FollowMyHealth portal, you will also be able to view your health information using other applications (apps) compatible with our system.

## 2023-07-13 NOTE — DISCHARGE NOTE NURSING/CASE MANAGEMENT/SOCIAL WORK - NSDCPEFALRISK_GEN_ALL_CORE
For information on Fall & Injury Prevention, visit: [email or web address] OR  [email or web address] OR  https://www.cdc.gov/steadi/patient.html

## 2023-07-14 DIAGNOSIS — Z90.79 ACQUIRED ABSENCE OF OTHER GENITAL ORGAN(S): Chronic | ICD-10-CM

## 2023-07-14 LAB
GLUCOSE BLDC GLUCOMTR-MCNC: 157 MG/DL — HIGH (ref 70–99)
GLUCOSE BLDC GLUCOMTR-MCNC: 176 MG/DL — HIGH (ref 70–99)

## 2023-07-14 PROCEDURE — 99232 SBSQ HOSP IP/OBS MODERATE 35: CPT

## 2023-07-14 PROCEDURE — 90853 GROUP PSYCHOTHERAPY: CPT

## 2023-07-14 PROCEDURE — 99223 1ST HOSP IP/OBS HIGH 75: CPT

## 2023-07-14 RX ORDER — LORATADINE 10 MG/1
10 TABLET ORAL DAILY
Refills: 0 | Status: DISCONTINUED | OUTPATIENT
Start: 2023-07-14 | End: 2023-07-21

## 2023-07-14 RX ORDER — INSULIN GLARGINE 100 [IU]/ML
15 INJECTION, SOLUTION SUBCUTANEOUS AT BEDTIME
Refills: 0 | Status: DISCONTINUED | OUTPATIENT
Start: 2023-07-14 | End: 2023-07-19

## 2023-07-14 RX ORDER — INSULIN LISPRO 100/ML
VIAL (ML) SUBCUTANEOUS AT BEDTIME
Refills: 0 | Status: DISCONTINUED | OUTPATIENT
Start: 2023-07-14 | End: 2023-07-21

## 2023-07-14 RX ORDER — METFORMIN HYDROCHLORIDE 850 MG/1
1000 TABLET ORAL
Refills: 0 | Status: DISCONTINUED | OUTPATIENT
Start: 2023-07-14 | End: 2023-07-21

## 2023-07-14 RX ORDER — LEVOTHYROXINE SODIUM 125 MCG
50 TABLET ORAL DAILY
Refills: 0 | Status: DISCONTINUED | OUTPATIENT
Start: 2023-07-14 | End: 2023-07-21

## 2023-07-14 RX ORDER — ATORVASTATIN CALCIUM 80 MG/1
10 TABLET, FILM COATED ORAL AT BEDTIME
Refills: 0 | Status: DISCONTINUED | OUTPATIENT
Start: 2023-07-14 | End: 2023-07-21

## 2023-07-14 RX ORDER — PANTOPRAZOLE SODIUM 20 MG/1
40 TABLET, DELAYED RELEASE ORAL
Refills: 0 | Status: DISCONTINUED | OUTPATIENT
Start: 2023-07-14 | End: 2023-07-21

## 2023-07-14 RX ORDER — INSULIN LISPRO 100/ML
VIAL (ML) SUBCUTANEOUS
Refills: 0 | Status: DISCONTINUED | OUTPATIENT
Start: 2023-07-14 | End: 2023-07-21

## 2023-07-14 RX ORDER — TAMSULOSIN HYDROCHLORIDE 0.4 MG/1
0.4 CAPSULE ORAL AT BEDTIME
Refills: 0 | Status: DISCONTINUED | OUTPATIENT
Start: 2023-07-14 | End: 2023-07-14

## 2023-07-14 RX ORDER — TAMSULOSIN HYDROCHLORIDE 0.4 MG/1
0.4 CAPSULE ORAL AT BEDTIME
Refills: 0 | Status: DISCONTINUED | OUTPATIENT
Start: 2023-07-14 | End: 2023-07-21

## 2023-07-14 RX ORDER — BACITRACIN ZINC 500 UNIT/G
1 OINTMENT IN PACKET (EA) TOPICAL THREE TIMES A DAY
Refills: 0 | Status: DISCONTINUED | OUTPATIENT
Start: 2023-07-14 | End: 2023-07-21

## 2023-07-14 RX ORDER — SENNA PLUS 8.6 MG/1
2 TABLET ORAL AT BEDTIME
Refills: 0 | Status: DISCONTINUED | OUTPATIENT
Start: 2023-07-14 | End: 2023-07-21

## 2023-07-14 RX ADMIN — ATORVASTATIN CALCIUM 10 MILLIGRAM(S): 80 TABLET, FILM COATED ORAL at 20:27

## 2023-07-14 RX ADMIN — DIVALPROEX SODIUM 500 MILLIGRAM(S): 500 TABLET, DELAYED RELEASE ORAL at 08:33

## 2023-07-14 RX ADMIN — Medication 0.5 MILLIGRAM(S): at 20:26

## 2023-07-14 RX ADMIN — GABAPENTIN 400 MILLIGRAM(S): 400 CAPSULE ORAL at 12:44

## 2023-07-14 RX ADMIN — Medication 0.5 MILLIGRAM(S): at 08:33

## 2023-07-14 RX ADMIN — MIRTAZAPINE 30 MILLIGRAM(S): 45 TABLET, ORALLY DISINTEGRATING ORAL at 20:27

## 2023-07-14 RX ADMIN — Medication 1: at 16:39

## 2023-07-14 RX ADMIN — SENNA PLUS 2 TABLET(S): 8.6 TABLET ORAL at 20:27

## 2023-07-14 RX ADMIN — DIVALPROEX SODIUM 1000 MILLIGRAM(S): 500 TABLET, DELAYED RELEASE ORAL at 20:26

## 2023-07-14 RX ADMIN — INSULIN GLARGINE 15 UNIT(S): 100 INJECTION, SOLUTION SUBCUTANEOUS at 21:14

## 2023-07-14 RX ADMIN — METFORMIN HYDROCHLORIDE 1000 MILLIGRAM(S): 850 TABLET ORAL at 16:36

## 2023-07-14 RX ADMIN — GABAPENTIN 400 MILLIGRAM(S): 400 CAPSULE ORAL at 08:33

## 2023-07-14 RX ADMIN — GUANFACINE 1 MILLIGRAM(S): 3 TABLET, EXTENDED RELEASE ORAL at 20:26

## 2023-07-14 RX ADMIN — Medication 50 MILLIGRAM(S): at 15:10

## 2023-07-14 RX ADMIN — RISPERIDONE 3 MILLIGRAM(S): 4 TABLET ORAL at 08:34

## 2023-07-14 RX ADMIN — GABAPENTIN 400 MILLIGRAM(S): 400 CAPSULE ORAL at 20:27

## 2023-07-14 RX ADMIN — TAMSULOSIN HYDROCHLORIDE 0.4 MILLIGRAM(S): 0.4 CAPSULE ORAL at 20:26

## 2023-07-14 RX ADMIN — RISPERIDONE 4 MILLIGRAM(S): 4 TABLET ORAL at 20:27

## 2023-07-14 NOTE — BH INPATIENT PSYCHIATRY PROGRESS NOTE - PRN MEDS
MEDICATIONS  (PRN):  bacitracin   Ointment 1 Application(s) Topical three times a day PRN wound  chlorproMAZINE    Injectable 50 milliGRAM(s) IntraMuscular once PRN severe agitation  chlorproMAZINE    Tablet 50 milliGRAM(s) Oral every 6 hours PRN agitation  diphenhydrAMINE 50 milliGRAM(s) Oral every 6 hours PRN EPS ppx.  diphenhydrAMINE Injectable 50 milliGRAM(s) IntraMuscular once PRN EPS ppx.  LORazepam     Tablet 1 milliGRAM(s) Oral every 6 hours PRN anxiety

## 2023-07-14 NOTE — CONSULT NOTE ADULT - ASSESSMENT
33M DM2, hypothyroidism, intellectual disability, obesity, asthma, autism, impulse control disorder and hx testicular CA treated for pneumonia at [facility], returns to [facility] for behavior management    Plan:   DM2: Continue lantus 15 units qhs, metformin 1000mg bid.  Statin for ASCVD prevention.     Hypothyroidism: Continue synthroid 75 daily    GERD: Continue protonix    Hx testicular cancer: Will need close follow up with urology after discharge for repeat testicular US.  Follows at [facility] urology.    Abdominal wound, self-inflicted: Healing, does not require treatment    Behavior management per primary team

## 2023-07-14 NOTE — BH INPATIENT PSYCHIATRY PROGRESS NOTE - NSBHCHARTREVIEWVS_PSY_A_CORE FT
Vital Signs Last 24 Hrs  T(C): 36.4 (07-14-23 @ 08:16), Max: 36.4 (07-14-23 @ 08:16)  T(F): 97.5 (07-14-23 @ 08:16), Max: 97.5 (07-14-23 @ 08:16)  HR: --  BP: --  BP(mean): --  RR: 17 (07-13-23 @ 21:32) (17 - 17)  SpO2: --    Orthostatic VS  07-14-23 @ 08:16  Lying BP: --/-- HR: --  Sitting BP: 85/55 HR: 72  Standing BP: 98/60 HR: 99  Site: --  Mode: electronic  Orthostatic VS  07-13-23 @ 19:27  Lying BP: --/-- HR: --  Sitting BP: 138/85 HR: 99  Standing BP: 103/55 HR: 112  Site: --  Mode: --  Orthostatic VS  07-13-23 @ 15:45  Lying BP: --/-- HR: --  Sitting BP: 127/57 HR: 87  Standing BP: 128/72 HR: 98  Site: upper right arm  Mode: electronic

## 2023-07-14 NOTE — CONSULT NOTE ADULT - SUBJECTIVE AND OBJECTIVE BOX
HPI: 33M intellectual disability, obesity, asthma, autism, impulse control disorder and hx testicular CA s/p orchiectomy admitted to [facility] 6/13/23, transferred to [facility] 6/23/23 when he had altered mental status and was found to have pneumonia.  He was treated and transferred back to [facility] 6/28/23.  That day patient complained of shortness of breath and was sent back to [facility] ED, and he was readmitted to [facility] where IV antibiotics were continued in case the pneumonis was contributing to the patient's symptoms.      Endorsing testicular pain, US showing "3 mm hypoechoic focus is seen in the region of the right mediastinum testis possibly present on the prior study from 9/29/2022. Close interval follow-up is recommended to exclude malignancy." --> Discussed with urology, recommend close outpatient follow-up for repeat US. No inpatient intervention. Per patient's mother, follows with urology at [place].     The patient was transferred back to [facility] 7/13/23.  He complained of chest pain last night and EKG was done, showed no changes.      PAST MEDICAL & SURGICAL HISTORY:    Intellectual disability    Obesity      GERD (gastroesophageal reflux disease)      Asthma      Factitious disorder      Urinary retention      Enuresis      Myopia of both eyes      Autism      Hypothyroidism      History of BPH      Impulse control disorder      DM2    Hx testicular cancer    Hx orchiectomy          Review of Systems:   CONSTITUTIONAL: No fever, weight loss, or fatigue  EYES: No eye pain, visual disturbances, or discharge  ENMT:  No difficulty hearing, tinnitus, vertigo; No sinus or throat pain  NECK: No pain or stiffness  RESPIRATORY: No cough, wheezing, chills or hemoptysis; No shortness of breath  CARDIOVASCULAR: No chest pain, palpitations, dizziness, or leg swelling  GASTROINTESTINAL: No abdominal or epigastric pain. No nausea, vomiting, or hematemesis; No diarrhea or constipation. No melena or hematochezia.  GENITOURINARY: No dysuria, frequency, hematuria, or incontinence  NEUROLOGICAL: No headaches, memory loss, loss of strength, numbness, or tremors  SKIN: No itching, burning, rashes, or lesions   LYMPH NODES: No enlarged glands  ENDOCRINE: No heat or cold intolerance; No hair loss  MUSCULOSKELETAL: No joint pain or swelling; No muscle, back, or extremity pain  HEME/LYMPH: No easy bruising, or bleeding gums  ALLERY AND IMMUNOLOGIC: No hives or eczema    Allergies    Zyprexa (Dystonic RXN)  Haldol (Rash)  Zyprexa (Unknown)  Bee stings (Unknown)  Haldol (Other)    Intolerances        Social History: no etoh tob idu    FAMILY HISTORY:  FH: diabetes mellitus (Mother)        MEDICATIONS  (STANDING):  clonazePAM  Tablet 0.5 milliGRAM(s) Oral two times a day  divalproex  milliGRAM(s) Oral daily  divalproex DR 1000 milliGRAM(s) Oral at bedtime  gabapentin 400 milliGRAM(s) Oral three times a day  guanFACINE. 1 milliGRAM(s) Oral at bedtime  mirtazapine 30 milliGRAM(s) Oral at bedtime  risperiDONE   Tablet 3 milliGRAM(s) Oral daily  risperiDONE   Tablet 4 milliGRAM(s) Oral at bedtime    MEDICATIONS  (PRN):  chlorproMAZINE    Injectable 50 milliGRAM(s) IntraMuscular once PRN severe agitation  chlorproMAZINE    Tablet 50 milliGRAM(s) Oral every 6 hours PRN agitation  diphenhydrAMINE 50 milliGRAM(s) Oral every 6 hours PRN EPS ppx.  diphenhydrAMINE Injectable 50 milliGRAM(s) IntraMuscular once PRN EPS ppx.  LORazepam     Tablet 1 milliGRAM(s) Oral every 6 hours PRN anxiety      Vital Signs Last 24 Hrs  T(C): 36.4 (14 Jul 2023 08:16), Max: 36.5 (13 Jul 2023 13:35)  T(F): 97.5 (14 Jul 2023 08:16), Max: 97.7 (13 Jul 2023 13:35)  HR: 95 (13 Jul 2023 13:35) (95 - 95)  BP: 127/95 (13 Jul 2023 13:35) (127/95 - 127/95)  BP(mean): --  RR: 17 (13 Jul 2023 21:32) (17 - 18)  SpO2: 97% (13 Jul 2023 13:35) (97% - 97%)    Parameters below as of 13 Jul 2023 15:45  Patient On (Oxygen Delivery Method): room air      CAPILLARY BLOOD GLUCOSE            PHYSICAL EXAM:  GENERAL: NAD  HEAD:  Atraumatic, Normocephalic  EYES: EOMI, conjunctiva and sclera clear  NECK: Supple, No JVD  CHEST/LUNG: Clear to auscultation bilaterally; No wheeze  HEART: Regular rate and rhythm; No murmurs, rubs, or gallops  ABDOMEN: Soft, Nontender, Nondistended; Bowel sounds present  EXTREMITIES:  2+ Peripheral Pulses, No clubbing, cyanosis, or edema  NEUROLOGY: non-focal  SKIN: No rashes or lesions    LABS:                    EKG(personally reviewed):    RADIOLOGY & ADDITIONAL TESTS:    Imaging Personally Reviewed:    Consultant(s) Notes Reviewed:      Care Discussed with Consultants/Other Providers:   HPI: 33M intellectual disability, obesity, asthma, autism, impulse control disorder and hx testicular CA s/p orchiectomy admitted to [facility] 6/13/23, transferred to [facility] 6/23/23 when he had altered mental status and was found to have pneumonia.  He was treated and transferred back to [facility] 6/28/23.  That day patient complained of shortness of breath and was sent back to [facility] ED, and he was readmitted to [facility] where IV antibiotics were continued in case the pneumonis was contributing to the patient's symptoms.      Endorsing testicular pain, US showing "3 mm hypoechoic focus is seen in the region of the right mediastinum testis possibly present on the prior study from 9/29/2022. Close interval follow-up is recommended to exclude malignancy." --> Discussed with urology, recommend close outpatient follow-up for repeat US. No inpatient intervention. Per patient's mother, follows with urology at [place].     The patient was transferred back to [facility] 7/13/23.  He complained of chest pain last night and EKG was done, showed no changes.  Denies complaints at present      PAST MEDICAL & SURGICAL HISTORY:    Intellectual disability    Obesity      GERD (gastroesophageal reflux disease)      Asthma      Factitious disorder      Urinary retention      Enuresis      Myopia of both eyes      Autism      Hypothyroidism      History of BPH      Impulse control disorder      DM2    Hx testicular cancer    Hx orchiectomy          Review of Systems:   CONSTITUTIONAL: No fever, weight loss, or fatigue  EYES: No eye pain, visual disturbances, or discharge  ENMT:  No difficulty hearing, tinnitus, vertigo; No sinus or throat pain  NECK: No pain or stiffness  RESPIRATORY: No cough, wheezing, chills or hemoptysis; No shortness of breath  CARDIOVASCULAR: No chest pain, palpitations, dizziness, or leg swelling  GASTROINTESTINAL: No abdominal or epigastric pain. No nausea, vomiting, or hematemesis; No diarrhea or constipation. No melena or hematochezia.  GENITOURINARY: No dysuria, frequency, hematuria, or incontinence  NEUROLOGICAL: No headaches, memory loss, loss of strength, numbness, or tremors  SKIN: No itching, burning, rashes, or lesions   LYMPH NODES: No enlarged glands  ENDOCRINE: No heat or cold intolerance; No hair loss  MUSCULOSKELETAL: No joint pain or swelling; No muscle, back, or extremity pain  HEME/LYMPH: No easy bruising, or bleeding gums  ALLERY AND IMMUNOLOGIC: No hives or eczema    Allergies    Zyprexa (Dystonic RXN)  Haldol (Rash)  Zyprexa (Unknown)  Bee stings (Unknown)  Haldol (Other)    Intolerances        Social History: no etoh tob idu    FAMILY HISTORY:  FH: diabetes mellitus (Mother)        MEDICATIONS  (STANDING):  clonazePAM  Tablet 0.5 milliGRAM(s) Oral two times a day  divalproex  milliGRAM(s) Oral daily  divalproex DR 1000 milliGRAM(s) Oral at bedtime  gabapentin 400 milliGRAM(s) Oral three times a day  guanFACINE. 1 milliGRAM(s) Oral at bedtime  mirtazapine 30 milliGRAM(s) Oral at bedtime  risperiDONE   Tablet 3 milliGRAM(s) Oral daily  risperiDONE   Tablet 4 milliGRAM(s) Oral at bedtime    MEDICATIONS  (PRN):  chlorproMAZINE    Injectable 50 milliGRAM(s) IntraMuscular once PRN severe agitation  chlorproMAZINE    Tablet 50 milliGRAM(s) Oral every 6 hours PRN agitation  diphenhydrAMINE 50 milliGRAM(s) Oral every 6 hours PRN EPS ppx.  diphenhydrAMINE Injectable 50 milliGRAM(s) IntraMuscular once PRN EPS ppx.  LORazepam     Tablet 1 milliGRAM(s) Oral every 6 hours PRN anxiety      Vital Signs Last 24 Hrs  T(C): 36.4 (14 Jul 2023 08:16), Max: 36.5 (13 Jul 2023 13:35)  T(F): 97.5 (14 Jul 2023 08:16), Max: 97.7 (13 Jul 2023 13:35)  HR: 95 (13 Jul 2023 13:35) (95 - 95)  BP: 127/95 (13 Jul 2023 13:35) (127/95 - 127/95)  BP(mean): --  RR: 17 (13 Jul 2023 21:32) (17 - 18)  SpO2: 97% (13 Jul 2023 13:35) (97% - 97%)    Parameters below as of 13 Jul 2023 15:45  Patient On (Oxygen Delivery Method): room air      CAPILLARY BLOOD GLUCOSE            PHYSICAL EXAM:  GENERAL: NAD, obese  HEAD:  Atraumatic, Normocephalic  EYES: EOMI, conjunctiva and sclera clear  NECK: Supple, No JVD  CHEST/LUNG: Clear to auscultation bilaterally; No wheeze  HEART: Regular rate and rhythm; No murmurs, rubs, or gallops  ABDOMEN: Soft, Nontender, Nondistended; Bowel sounds present  EXTREMITIES:  2+ Peripheral Pulses, No clubbing, cyanosis, or edema  NEUROLOGY: non-focal  SKIN: healing 1cm wound on right abdomen, eschar, clean no drainage    LABS:        Reviewed in sunrise

## 2023-07-14 NOTE — BH INPATIENT PSYCHIATRY PROGRESS NOTE - CURRENT MEDICATION
MEDICATIONS  (STANDING):  atorvastatin 10 milliGRAM(s) Oral at bedtime  clonazePAM  Tablet 0.5 milliGRAM(s) Oral two times a day  divalproex  milliGRAM(s) Oral daily  divalproex DR 1000 milliGRAM(s) Oral at bedtime  gabapentin 400 milliGRAM(s) Oral three times a day  guanFACINE. 1 milliGRAM(s) Oral at bedtime  insulin glargine Injectable (LANTUS) 15 Unit(s) SubCutaneous at bedtime  insulin lispro (ADMELOG) corrective regimen sliding scale   SubCutaneous at bedtime  insulin lispro (ADMELOG) corrective regimen sliding scale   SubCutaneous three times a day before meals  levothyroxine 50 MICROGram(s) Oral daily  loratadine 10 milliGRAM(s) Oral daily  metFORMIN 1000 milliGRAM(s) Oral two times a day with meals  mirtazapine 30 milliGRAM(s) Oral at bedtime  pantoprazole    Tablet 40 milliGRAM(s) Oral before breakfast  risperiDONE   Tablet 3 milliGRAM(s) Oral daily  risperiDONE   Tablet 4 milliGRAM(s) Oral at bedtime  senna 2 Tablet(s) Oral at bedtime  tamsulosin 0.4 milliGRAM(s) Oral at bedtime    MEDICATIONS  (PRN):  bacitracin   Ointment 1 Application(s) Topical three times a day PRN wound  chlorproMAZINE    Injectable 50 milliGRAM(s) IntraMuscular once PRN severe agitation  chlorproMAZINE    Tablet 50 milliGRAM(s) Oral every 6 hours PRN agitation  diphenhydrAMINE 50 milliGRAM(s) Oral every 6 hours PRN EPS ppx.  diphenhydrAMINE Injectable 50 milliGRAM(s) IntraMuscular once PRN EPS ppx.  LORazepam     Tablet 1 milliGRAM(s) Oral every 6 hours PRN anxiety

## 2023-07-14 NOTE — BH PSYCHOLOGY - GROUP THERAPY NOTE - NSBHPSYCHOLRESPCOMMENT_PSY_A_CORE FT
The patient appeared adequately groomed and casually dressed. Pt appeared fairly engaged in the group as evidenced by his willingness to verbally communicate during the check-in with prompting. Pt shared that he would title his book something related to Lord of the Rings as this is his favorite series; showing group member a tattoo he has representing several characters. Pt was quiet during much of the discussion. Speech WNL. PT was oriented X3. Pt was appropriate with peers.

## 2023-07-14 NOTE — BH SOCIAL WORK INITIAL PSYCHOSOCIAL EVALUATION - OTHER PAST PSYCHIATRIC HISTORY (INCLUDE DETAILS REGARDING ONSET, COURSE OF ILLNESS, INPATIENT/OUTPATIENT TREATMENT)
Pt was admitted medically for pneumonia and was readmitted to [facility]. From [facility], pt was readmitted to medicine as he was not medically stable. Pt is now readmitted to [facility] and the following is from this writer's psychosocial assessment one month ago.     Pt is a 34YO single white man, domiciled in [facility] in [place], BIBEMS activated by self in the context of cutting his stomach with the metal screws from his eyeglasses, superficial lacerations were evident and no sutures provided while in medicine. PPH of moderate ID, ASD, impulse control disorder, factitious disorder, HAVEN, mood disorder, PTSD, and ADHD. Pt has had multiple hospitalizations, most recently discharged last week from [facility] and was a rapid readmit. Pt has a hx of self-injurious behaviors, such as head banging and cutting. Pt has hx of making suicidal statements but no hx of suicide attempts, hx of aggression and violence toward staff. Pt endorses CAH in the context of voices telling him to kill himself.   Pt has a medical hx of asthma, DM, urinary retention, GERD, Hypothyroidism, HLD, HTN, b/l myopia.   Pt does not have any legal hx.    Medicaid: [id number]

## 2023-07-14 NOTE — BH PSYCHOLOGY - GROUP THERAPY NOTE - TOKEN PULL-DIAGNOSIS
Primary Diagnosis:  Autism spectrum disorder [F84.0]        Problem Dx:   Post traumatic stress disorder (PTSD) [F43.10]      History of impulse control disorder [Z86.59]

## 2023-07-14 NOTE — BH PSYCHOLOGY - GROUP THERAPY NOTE - NSPSYCHOLGRPCOGPT_PSY_A_CORE FT
Patient attended Cognitive Behavioral Therapy Group incorporating ACT-based concepts. The group started with a brief check-in asking Pts to share what they would title a book if they were to write one. Members then reacted to a metaphor/exercise about acceptance and the impacts of suppressing intrusive or uncomfortable thoughts. Lastly, group focused on engaging in a discussion about rethinking the labels placed on certain emotions, the impacts of labeling emotions as “good or bad”, how we react to “negative” emotions vs. “positive” ones, and the notion of accepting the full spectrum of human emotions. Group facilitator explained concepts, reinforced participation, and engaged patients in the discussion.

## 2023-07-14 NOTE — CHART NOTE - NSCHARTNOTEFT_GEN_A_CORE
Pt with complaint of [id number] urine output today 7/14.   Bladder scan reveals : 450ml of urine in bladder.  A/P   33M admitted to [facility] for Bipolar disorder, returned from [facility] 7/12 started on Flomax 0.4 mg for urinary retention. Currently still retaining urine.  Urinary retention: Flomax 0.4 mg po X 1 given, monitor for urine out put and repeat bladder scan ordered for 7/15. Pt with complaint of [id number] urine output today 7/14.   Bladder scan reveals : 450ml of urine in bladder.  A/P   33M admitted to [facility] for Bipolar disorder, returned from [facility] 7/12 started on Flomax 0.4 mg for urinary retention. Currently still retaining urine.   Urinary retention: Flomax 0.4 mg po X 1 given, monitor for urine out put and repeat bladder scan ordered for 7/15.    ****Addendum: PT refused Flomax, but stated he had to urinate. Urine out put > 100ml. Pt went to bad and currently sleeping. Repeat bladder scan ordered for 7/15 AM Pt with complaint of [id number] urine output today 7/14.   Bladder scan reveals : 450ml of urine in bladder.  A/P   33M admitted to [facility] for Bipolar disorder, returned from [facility] 7/12 started on Flomax 0.4 mg for urinary retention. Currently still retaining urine.   Urinary retention: Flomax 0.4 mg po X 1 given, monitor for urine output and repeat bladder scan ordered for 7/15.    ****Addendum: PT refused Flomax, but stated he had to urinate. Urine output > 100ml. Pt went to bed and currently sleeping. Repeat bladder scan ordered for 7/15 AM

## 2023-07-14 NOTE — PSYCHIATRIC REHAB INITIAL EVALUATION - NSBHPRRECOMMEND_PSY_ALL_CORE
Writer met with Pt to orient him to Psych Rehab department and its functions. Pt was receptive. Pt was fully engaged, calm and cooperative during the session, Pt identified his primary reason of hospitalization as self-inflicted laceration to abdomen due to CAH. Pt denies SI/HI/AH/VH. Currently, the pt is on CO 1:1 for safety. Pt reports that he does not get along with staff of his residence and he is in process of changing residence. Pt shares a good relationship with his parents who lives in [place] and sister in [place]. Pt visit them during holidays.   Per the chart Pt is with PPH of moderate ID, ASD, impulse control disorder, factitious disorder, HAVEN, mood disorders, PTSD and ADHD. Patient has extensive history of inpatient hospitalization and ED visits for both medical/psych complaints, most recently was discharged from [facility] on 6/6/23, longstanding h/o SIB (head banging, cutting), but no known SA, longstanding h/o endorsing SI, h/o aggression toward staff at [facility].  Writer was able to establish a collaborative treatment goal to work on over the next 7 days. Pt was receptive. Writer provided the Pt a copy of unit’s schedule and encouraged the Pt to attend groups. Pt was receptive. Over the next seven days, Psychiatric Rehabilitation staff will continue to provide encouragement, support, psychotherapy, and psychoeducation to assist the Pt in the progression of his treatment goal and engagement with activities.

## 2023-07-14 NOTE — PSYCHIATRIC REHAB INITIAL EVALUATION - NSBHSIGPRIORMED_PSY_ALL_CORE
PMHx significant for asthma, DM, urinary retention, GERD, BPH, hypothyroidism, HLD, HTN, and b/l myopia./Yes (Specify)

## 2023-07-15 LAB
GLUCOSE BLDC GLUCOMTR-MCNC: 186 MG/DL — HIGH (ref 70–99)
GLUCOSE BLDC GLUCOMTR-MCNC: 263 MG/DL — HIGH (ref 70–99)

## 2023-07-15 PROCEDURE — 99231 SBSQ HOSP IP/OBS SF/LOW 25: CPT

## 2023-07-15 RX ADMIN — DIVALPROEX SODIUM 1000 MILLIGRAM(S): 500 TABLET, DELAYED RELEASE ORAL at 20:06

## 2023-07-15 RX ADMIN — Medication 1: at 12:12

## 2023-07-15 RX ADMIN — DIVALPROEX SODIUM 500 MILLIGRAM(S): 500 TABLET, DELAYED RELEASE ORAL at 09:19

## 2023-07-15 RX ADMIN — LORATADINE 10 MILLIGRAM(S): 10 TABLET ORAL at 09:18

## 2023-07-15 RX ADMIN — GABAPENTIN 400 MILLIGRAM(S): 400 CAPSULE ORAL at 20:06

## 2023-07-15 RX ADMIN — Medication 0.5 MILLIGRAM(S): at 09:19

## 2023-07-15 RX ADMIN — RISPERIDONE 4 MILLIGRAM(S): 4 TABLET ORAL at 20:06

## 2023-07-15 RX ADMIN — SENNA PLUS 2 TABLET(S): 8.6 TABLET ORAL at 20:05

## 2023-07-15 RX ADMIN — ATORVASTATIN CALCIUM 10 MILLIGRAM(S): 80 TABLET, FILM COATED ORAL at 20:06

## 2023-07-15 RX ADMIN — GABAPENTIN 400 MILLIGRAM(S): 400 CAPSULE ORAL at 12:22

## 2023-07-15 RX ADMIN — INSULIN GLARGINE 15 UNIT(S): 100 INJECTION, SOLUTION SUBCUTANEOUS at 21:10

## 2023-07-15 RX ADMIN — GUANFACINE 1 MILLIGRAM(S): 3 TABLET, EXTENDED RELEASE ORAL at 20:05

## 2023-07-15 RX ADMIN — MIRTAZAPINE 30 MILLIGRAM(S): 45 TABLET, ORALLY DISINTEGRATING ORAL at 20:06

## 2023-07-15 RX ADMIN — GABAPENTIN 400 MILLIGRAM(S): 400 CAPSULE ORAL at 09:18

## 2023-07-15 RX ADMIN — Medication 0.5 MILLIGRAM(S): at 20:06

## 2023-07-15 RX ADMIN — TAMSULOSIN HYDROCHLORIDE 0.4 MILLIGRAM(S): 0.4 CAPSULE ORAL at 20:06

## 2023-07-15 RX ADMIN — RISPERIDONE 3 MILLIGRAM(S): 4 TABLET ORAL at 09:19

## 2023-07-15 RX ADMIN — Medication 1: at 21:10

## 2023-07-15 NOTE — BH INPATIENT PSYCHIATRY PROGRESS NOTE - NSBHMETABOLIC_PSY_ALL_CORE_FT
BMI: BMI (kg/m2): 40.3 (07-13-23 @ 15:45)  HbA1c: A1C with Estimated Average Glucose Result: 6.7 % (06-24-23 @ 06:10)    Glucose: POCT Blood Glucose.: 186 mg/dL (07-15-23 @ 11:27)    BP: --  Lipid Panel: Date/Time: 06-24-23 @ 06:10  Cholesterol, Serum: 118  Direct LDL: --  HDL Cholesterol, Serum: 25  Total Cholesterol/HDL Ration Measurement: --  Triglycerides, Serum: 155

## 2023-07-15 NOTE — BH INPATIENT PSYCHIATRY PROGRESS NOTE - NSBHCHARTREVIEWVS_PSY_A_CORE FT
Vital Signs Last 24 Hrs  T(C): 36.7 (07-15-23 @ 09:29), Max: 37.1 (07-14-23 @ 19:08)  T(F): 98.1 (07-15-23 @ 09:29), Max: 98.7 (07-14-23 @ 19:08)  HR: --  BP: --  BP(mean): --  RR: 16 (07-15-23 @ 09:29) (16 - 17)  SpO2: 98% (07-15-23 @ 09:29) (98% - 98%)    Orthostatic VS  07-15-23 @ 09:29  Lying BP: --/-- HR: --  Sitting BP: 114/74 HR: 84  Standing BP: --/-- HR: --  Site: --  Mode: --  Orthostatic VS  07-14-23 @ 19:08  Lying BP: --/-- HR: --  Sitting BP: 110/62 HR: 81  Standing BP: --/-- HR: --  Site: --  Mode: --  Orthostatic VS  07-14-23 @ 08:16  Lying BP: --/-- HR: --  Sitting BP: 85/55 HR: 72  Standing BP: 98/60 HR: 99  Site: --  Mode: electronic  Orthostatic VS  07-13-23 @ 19:27  Lying BP: --/-- HR: --  Sitting BP: 138/85 HR: 99  Standing BP: 103/55 HR: 112  Site: --  Mode: --  Orthostatic VS  07-13-23 @ 15:45  Lying BP: --/-- HR: --  Sitting BP: 127/57 HR: 87  Standing BP: 128/72 HR: 98  Site: upper right arm  Mode: electronic

## 2023-07-16 LAB
GLUCOSE BLDC GLUCOMTR-MCNC: 135 MG/DL — HIGH (ref 70–99)
GLUCOSE BLDC GLUCOMTR-MCNC: 142 MG/DL — HIGH (ref 70–99)

## 2023-07-16 PROCEDURE — 99231 SBSQ HOSP IP/OBS SF/LOW 25: CPT

## 2023-07-16 RX ADMIN — LORATADINE 10 MILLIGRAM(S): 10 TABLET ORAL at 09:08

## 2023-07-16 RX ADMIN — Medication 0.5 MILLIGRAM(S): at 20:27

## 2023-07-16 RX ADMIN — METFORMIN HYDROCHLORIDE 1000 MILLIGRAM(S): 850 TABLET ORAL at 09:08

## 2023-07-16 RX ADMIN — METFORMIN HYDROCHLORIDE 1000 MILLIGRAM(S): 850 TABLET ORAL at 16:38

## 2023-07-16 RX ADMIN — ATORVASTATIN CALCIUM 10 MILLIGRAM(S): 80 TABLET, FILM COATED ORAL at 20:26

## 2023-07-16 RX ADMIN — MIRTAZAPINE 30 MILLIGRAM(S): 45 TABLET, ORALLY DISINTEGRATING ORAL at 20:26

## 2023-07-16 RX ADMIN — GUANFACINE 1 MILLIGRAM(S): 3 TABLET, EXTENDED RELEASE ORAL at 20:27

## 2023-07-16 RX ADMIN — RISPERIDONE 3 MILLIGRAM(S): 4 TABLET ORAL at 09:08

## 2023-07-16 RX ADMIN — PANTOPRAZOLE SODIUM 40 MILLIGRAM(S): 20 TABLET, DELAYED RELEASE ORAL at 09:08

## 2023-07-16 RX ADMIN — DIVALPROEX SODIUM 500 MILLIGRAM(S): 500 TABLET, DELAYED RELEASE ORAL at 09:15

## 2023-07-16 RX ADMIN — GABAPENTIN 400 MILLIGRAM(S): 400 CAPSULE ORAL at 20:27

## 2023-07-16 RX ADMIN — INSULIN GLARGINE 15 UNIT(S): 100 INJECTION, SOLUTION SUBCUTANEOUS at 20:27

## 2023-07-16 RX ADMIN — Medication 0.5 MILLIGRAM(S): at 09:08

## 2023-07-16 RX ADMIN — SENNA PLUS 2 TABLET(S): 8.6 TABLET ORAL at 20:27

## 2023-07-16 RX ADMIN — Medication 1 MILLIGRAM(S): at 14:50

## 2023-07-16 RX ADMIN — RISPERIDONE 4 MILLIGRAM(S): 4 TABLET ORAL at 20:27

## 2023-07-16 RX ADMIN — GABAPENTIN 400 MILLIGRAM(S): 400 CAPSULE ORAL at 09:08

## 2023-07-16 RX ADMIN — DIVALPROEX SODIUM 1000 MILLIGRAM(S): 500 TABLET, DELAYED RELEASE ORAL at 20:26

## 2023-07-16 RX ADMIN — GABAPENTIN 400 MILLIGRAM(S): 400 CAPSULE ORAL at 13:40

## 2023-07-16 RX ADMIN — TAMSULOSIN HYDROCHLORIDE 0.4 MILLIGRAM(S): 0.4 CAPSULE ORAL at 20:27

## 2023-07-16 RX ADMIN — Medication 50 MILLIGRAM(S): at 14:50

## 2023-07-16 NOTE — BH INPATIENT PSYCHIATRY PROGRESS NOTE - NSBHMETABOLIC_PSY_ALL_CORE_FT
BMI: BMI (kg/m2): 40.3 (07-13-23 @ 15:45)  HbA1c: A1C with Estimated Average Glucose Result: 6.7 % (06-24-23 @ 06:10)    Glucose: POCT Blood Glucose.: 263 mg/dL (07-15-23 @ 20:17)    BP: 104/64 (07-16-23 @ 08:45) (104/64 - 104/64)  Lipid Panel: Date/Time: 06-24-23 @ 06:10  Cholesterol, Serum: 118  Direct LDL: --  HDL Cholesterol, Serum: 25  Total Cholesterol/HDL Ration Measurement: --  Triglycerides, Serum: 155

## 2023-07-16 NOTE — ED PROVIDER NOTE - INTERNATIONAL TRAVEL
O2 saturation decreased to 87%. O2 sat monitor checked for placement, MD called to bedside. MD changed O2 to 45%, pt O2 saturation improved to 98%. pt is A&Ox3, placed on monitors, comfortable at this time, RT placed patient on BIPAP, Fio2 30% will continue to monitor Pt resting in bed. CT resulted, no PE. Respiratory at bedside drawing ABG. Report received from overnight nurse. Pt on BiPap, O2 saturation at 95%. Pt resting in bed. No

## 2023-07-16 NOTE — BH INPATIENT PSYCHIATRY PROGRESS NOTE - NSBHCHARTREVIEWVS_PSY_A_CORE FT
Vital Signs Last 24 Hrs  T(C): 36.7 (07-16-23 @ 08:45), Max: 36.7 (07-16-23 @ 08:45)  T(F): 98.1 (07-16-23 @ 08:45), Max: 98.1 (07-16-23 @ 08:45)  HR: 67 (07-16-23 @ 08:45) (67 - 67)  BP: 104/64 (07-16-23 @ 08:45) (104/64 - 104/64)  BP(mean): --  RR: 16 (07-16-23 @ 08:45) (16 - 16)  SpO2: 98% (07-16-23 @ 08:45) (98% - 98%)    Orthostatic VS  07-15-23 @ 19:13  Lying BP: --/-- HR: --  Sitting BP: 123/81 HR: 102  Standing BP: 125/88 HR: 113  Site: --  Mode: --  Orthostatic VS  07-15-23 @ 09:29  Lying BP: --/-- HR: --  Sitting BP: 114/74 HR: 84  Standing BP: --/-- HR: --  Site: --  Mode: --  Orthostatic VS  07-14-23 @ 19:08  Lying BP: --/-- HR: --  Sitting BP: 110/62 HR: 81  Standing BP: --/-- HR: --  Site: --  Mode: --

## 2023-07-16 NOTE — BH CHART NOTE - NSEVENTNOTEFT_PSY_ALL_CORE
CAROL called about patient having bloody stools for one day. Nursing unable to see as toilets automatically flush. Pt VS are WNL and stable. Denies other sx such as lightheadedness/dizzinesss. Discussed case with hospitalist. Ordered CBC for tomorrow morning. Discussed with nursing to also put hat on toilet to examine before it flushes, and that if vitals worsen or pt becomes symptomatic to consider sending pt to ED.

## 2023-07-17 LAB
GLUCOSE BLDC GLUCOMTR-MCNC: 106 MG/DL — HIGH (ref 70–99)
GLUCOSE BLDC GLUCOMTR-MCNC: 107 MG/DL — HIGH (ref 70–99)
GLUCOSE BLDC GLUCOMTR-MCNC: 115 MG/DL — HIGH (ref 70–99)
GLUCOSE BLDC GLUCOMTR-MCNC: 115 MG/DL — HIGH (ref 70–99)

## 2023-07-17 PROCEDURE — 99231 SBSQ HOSP IP/OBS SF/LOW 25: CPT

## 2023-07-17 RX ORDER — GUANFACINE 3 MG/1
1 TABLET, EXTENDED RELEASE ORAL
Refills: 0 | Status: DISCONTINUED | OUTPATIENT
Start: 2023-07-17 | End: 2023-07-20

## 2023-07-17 RX ORDER — RISPERIDONE 4 MG/1
3 TABLET ORAL
Refills: 0 | Status: DISCONTINUED | OUTPATIENT
Start: 2023-07-17 | End: 2023-07-21

## 2023-07-17 RX ADMIN — RISPERIDONE 3 MILLIGRAM(S): 4 TABLET ORAL at 08:24

## 2023-07-17 RX ADMIN — GUANFACINE 1 MILLIGRAM(S): 3 TABLET, EXTENDED RELEASE ORAL at 20:22

## 2023-07-17 RX ADMIN — GABAPENTIN 400 MILLIGRAM(S): 400 CAPSULE ORAL at 08:24

## 2023-07-17 RX ADMIN — INSULIN GLARGINE 15 UNIT(S): 100 INJECTION, SOLUTION SUBCUTANEOUS at 21:12

## 2023-07-17 RX ADMIN — RISPERIDONE 3 MILLIGRAM(S): 4 TABLET ORAL at 20:22

## 2023-07-17 RX ADMIN — LORATADINE 10 MILLIGRAM(S): 10 TABLET ORAL at 08:25

## 2023-07-17 RX ADMIN — GABAPENTIN 400 MILLIGRAM(S): 400 CAPSULE ORAL at 20:23

## 2023-07-17 RX ADMIN — TAMSULOSIN HYDROCHLORIDE 0.4 MILLIGRAM(S): 0.4 CAPSULE ORAL at 20:22

## 2023-07-17 RX ADMIN — GABAPENTIN 400 MILLIGRAM(S): 400 CAPSULE ORAL at 13:32

## 2023-07-17 RX ADMIN — PANTOPRAZOLE SODIUM 40 MILLIGRAM(S): 20 TABLET, DELAYED RELEASE ORAL at 08:24

## 2023-07-17 RX ADMIN — Medication 50 MILLIGRAM(S): at 20:23

## 2023-07-17 RX ADMIN — Medication 0.5 MILLIGRAM(S): at 08:25

## 2023-07-17 RX ADMIN — DIVALPROEX SODIUM 500 MILLIGRAM(S): 500 TABLET, DELAYED RELEASE ORAL at 08:25

## 2023-07-17 RX ADMIN — SENNA PLUS 2 TABLET(S): 8.6 TABLET ORAL at 20:23

## 2023-07-17 RX ADMIN — Medication 0.5 MILLIGRAM(S): at 20:23

## 2023-07-17 RX ADMIN — DIVALPROEX SODIUM 1000 MILLIGRAM(S): 500 TABLET, DELAYED RELEASE ORAL at 20:22

## 2023-07-17 RX ADMIN — MIRTAZAPINE 30 MILLIGRAM(S): 45 TABLET, ORALLY DISINTEGRATING ORAL at 20:23

## 2023-07-17 RX ADMIN — METFORMIN HYDROCHLORIDE 1000 MILLIGRAM(S): 850 TABLET ORAL at 16:21

## 2023-07-17 RX ADMIN — METFORMIN HYDROCHLORIDE 1000 MILLIGRAM(S): 850 TABLET ORAL at 08:25

## 2023-07-17 RX ADMIN — ATORVASTATIN CALCIUM 10 MILLIGRAM(S): 80 TABLET, FILM COATED ORAL at 20:23

## 2023-07-17 NOTE — BH INPATIENT PSYCHIATRY PROGRESS NOTE - NSBHMETABOLIC_PSY_ALL_CORE_FT
BMI: BMI (kg/m2): 40.3 (07-13-23 @ 15:45)  HbA1c: A1C with Estimated Average Glucose Result: 6.7 % (06-24-23 @ 06:10)    Glucose: POCT Blood Glucose.: 115 mg/dL (07-17-23 @ 11:33)    BP: 104/64 (07-16-23 @ 08:45) (104/64 - 104/64)  Lipid Panel: Date/Time: 06-24-23 @ 06:10  Cholesterol, Serum: 118  Direct LDL: --  HDL Cholesterol, Serum: 25  Total Cholesterol/HDL Ration Measurement: --  Triglycerides, Serum: 155

## 2023-07-17 NOTE — BH INPATIENT PSYCHIATRY PROGRESS NOTE - CURRENT MEDICATION
MEDICATIONS  (STANDING):  atorvastatin 10 milliGRAM(s) Oral at bedtime  clonazePAM  Tablet 0.5 milliGRAM(s) Oral two times a day  divalproex  milliGRAM(s) Oral daily  divalproex DR 1000 milliGRAM(s) Oral at bedtime  gabapentin 400 milliGRAM(s) Oral three times a day  guanFACINE. 1 milliGRAM(s) Oral at bedtime  insulin glargine Injectable (LANTUS) 15 Unit(s) SubCutaneous at bedtime  insulin lispro (ADMELOG) corrective regimen sliding scale   SubCutaneous at bedtime  insulin lispro (ADMELOG) corrective regimen sliding scale   SubCutaneous three times a day before meals  levothyroxine 50 MICROGram(s) Oral daily  loratadine 10 milliGRAM(s) Oral daily  metFORMIN 1000 milliGRAM(s) Oral two times a day with meals  mirtazapine 30 milliGRAM(s) Oral at bedtime  pantoprazole    Tablet 40 milliGRAM(s) Oral before breakfast  risperiDONE   Tablet 4 milliGRAM(s) Oral at bedtime  risperiDONE   Tablet 3 milliGRAM(s) Oral daily  senna 2 Tablet(s) Oral at bedtime  tamsulosin 0.4 milliGRAM(s) Oral at bedtime    MEDICATIONS  (PRN):  bacitracin   Ointment 1 Application(s) Topical three times a day PRN wound  chlorproMAZINE    Injectable 50 milliGRAM(s) IntraMuscular once PRN severe agitation  chlorproMAZINE    Tablet 50 milliGRAM(s) Oral every 6 hours PRN agitation  diphenhydrAMINE 50 milliGRAM(s) Oral every 6 hours PRN EPS ppx.  diphenhydrAMINE Injectable 50 milliGRAM(s) IntraMuscular once PRN EPS ppx.  LORazepam     Tablet 1 milliGRAM(s) Oral every 6 hours PRN anxiety

## 2023-07-17 NOTE — BH INPATIENT PSYCHIATRY PROGRESS NOTE - NSBHCHARTREVIEWVS_PSY_A_CORE FT
Vital Signs Last 24 Hrs  T(C): 36.4 (07-17-23 @ 08:20), Max: 36.4 (07-17-23 @ 08:20)  T(F): 97.5 (07-17-23 @ 08:20), Max: 97.5 (07-17-23 @ 08:20)  HR: --  BP: --  BP(mean): --  RR: 16 (07-16-23 @ 19:39) (16 - 16)  SpO2: --    Orthostatic VS  07-17-23 @ 08:20  Lying BP: --/-- HR: --  Sitting BP: 116/75 HR: 89  Standing BP: --/-- HR: --  Site: --  Mode: electronic  Orthostatic VS  07-16-23 @ 19:39  Lying BP: --/-- HR: --  Sitting BP: 95/66 HR: 94  Standing BP: 118/73 HR: 103  Site: --  Mode: --  Orthostatic VS  07-15-23 @ 19:13  Lying BP: --/-- HR: --  Sitting BP: 123/81 HR: 102  Standing BP: 125/88 HR: 113  Site: --  Mode: --

## 2023-07-18 LAB
APPEARANCE UR: CLEAR — SIGNIFICANT CHANGE UP
BILIRUB UR-MCNC: NEGATIVE — SIGNIFICANT CHANGE UP
COLOR SPEC: SIGNIFICANT CHANGE UP
DIFF PNL FLD: NEGATIVE — SIGNIFICANT CHANGE UP
GLUCOSE BLDC GLUCOMTR-MCNC: 104 MG/DL — HIGH (ref 70–99)
GLUCOSE BLDC GLUCOMTR-MCNC: 122 MG/DL — HIGH (ref 70–99)
GLUCOSE BLDC GLUCOMTR-MCNC: 130 MG/DL — HIGH (ref 70–99)
GLUCOSE BLDC GLUCOMTR-MCNC: 150 MG/DL — HIGH (ref 70–99)
GLUCOSE UR QL: NEGATIVE MG/DL — SIGNIFICANT CHANGE UP
KETONES UR-MCNC: ABNORMAL MG/DL
LEUKOCYTE ESTERASE UR-ACNC: NEGATIVE — SIGNIFICANT CHANGE UP
NITRITE UR-MCNC: NEGATIVE — SIGNIFICANT CHANGE UP
PH UR: 5.5 — SIGNIFICANT CHANGE UP (ref 5–8)
PROT UR-MCNC: SIGNIFICANT CHANGE UP MG/DL
SP GR SPEC: 1.03 — HIGH (ref 1–1.03)
UROBILINOGEN FLD QL: 1 MG/DL — SIGNIFICANT CHANGE UP (ref 0.2–1)

## 2023-07-18 PROCEDURE — 99232 SBSQ HOSP IP/OBS MODERATE 35: CPT

## 2023-07-18 RX ORDER — IBUPROFEN 200 MG
400 TABLET ORAL EVERY 6 HOURS
Refills: 0 | Status: DISCONTINUED | OUTPATIENT
Start: 2023-07-18 | End: 2023-07-21

## 2023-07-18 RX ADMIN — SENNA PLUS 2 TABLET(S): 8.6 TABLET ORAL at 19:37

## 2023-07-18 RX ADMIN — METFORMIN HYDROCHLORIDE 1000 MILLIGRAM(S): 850 TABLET ORAL at 15:34

## 2023-07-18 RX ADMIN — INSULIN GLARGINE 15 UNIT(S): 100 INJECTION, SOLUTION SUBCUTANEOUS at 20:23

## 2023-07-18 RX ADMIN — MIRTAZAPINE 30 MILLIGRAM(S): 45 TABLET, ORALLY DISINTEGRATING ORAL at 19:37

## 2023-07-18 RX ADMIN — Medication 50 MICROGRAM(S): at 08:12

## 2023-07-18 RX ADMIN — RISPERIDONE 3 MILLIGRAM(S): 4 TABLET ORAL at 08:14

## 2023-07-18 RX ADMIN — GABAPENTIN 400 MILLIGRAM(S): 400 CAPSULE ORAL at 19:37

## 2023-07-18 RX ADMIN — Medication 0.5 MILLIGRAM(S): at 08:15

## 2023-07-18 RX ADMIN — Medication 50 MILLIGRAM(S): at 19:38

## 2023-07-18 RX ADMIN — TAMSULOSIN HYDROCHLORIDE 0.4 MILLIGRAM(S): 0.4 CAPSULE ORAL at 19:36

## 2023-07-18 RX ADMIN — METFORMIN HYDROCHLORIDE 1000 MILLIGRAM(S): 850 TABLET ORAL at 08:12

## 2023-07-18 RX ADMIN — LORATADINE 10 MILLIGRAM(S): 10 TABLET ORAL at 08:13

## 2023-07-18 RX ADMIN — RISPERIDONE 3 MILLIGRAM(S): 4 TABLET ORAL at 19:36

## 2023-07-18 RX ADMIN — DIVALPROEX SODIUM 500 MILLIGRAM(S): 500 TABLET, DELAYED RELEASE ORAL at 08:14

## 2023-07-18 RX ADMIN — GUANFACINE 1 MILLIGRAM(S): 3 TABLET, EXTENDED RELEASE ORAL at 19:37

## 2023-07-18 RX ADMIN — GABAPENTIN 400 MILLIGRAM(S): 400 CAPSULE ORAL at 08:14

## 2023-07-18 RX ADMIN — Medication 400 MILLIGRAM(S): at 19:36

## 2023-07-18 RX ADMIN — ATORVASTATIN CALCIUM 10 MILLIGRAM(S): 80 TABLET, FILM COATED ORAL at 19:37

## 2023-07-18 RX ADMIN — DIVALPROEX SODIUM 1000 MILLIGRAM(S): 500 TABLET, DELAYED RELEASE ORAL at 19:36

## 2023-07-18 RX ADMIN — Medication 0.5 MILLIGRAM(S): at 19:36

## 2023-07-18 RX ADMIN — PANTOPRAZOLE SODIUM 40 MILLIGRAM(S): 20 TABLET, DELAYED RELEASE ORAL at 08:14

## 2023-07-18 NOTE — BH INPATIENT PSYCHIATRY PROGRESS NOTE - PRN MEDS
MEDICATIONS  (PRN):  bacitracin   Ointment 1 Application(s) Topical three times a day PRN wound  chlorproMAZINE    Injectable 50 milliGRAM(s) IntraMuscular once PRN severe agitation  chlorproMAZINE    Tablet 50 milliGRAM(s) Oral every 6 hours PRN agitation  diphenhydrAMINE 50 milliGRAM(s) Oral every 6 hours PRN EPS ppx.  diphenhydrAMINE Injectable 50 milliGRAM(s) IntraMuscular once PRN EPS ppx.  LORazepam     Tablet 1 milliGRAM(s) Oral every 6 hours PRN anxiety   MEDICATIONS  (PRN):  bacitracin   Ointment 1 Application(s) Topical three times a day PRN wound  chlorproMAZINE    Injectable 50 milliGRAM(s) IntraMuscular once PRN severe agitation  chlorproMAZINE    Tablet 50 milliGRAM(s) Oral every 6 hours PRN agitation  diphenhydrAMINE 50 milliGRAM(s) Oral every 6 hours PRN EPS ppx.  diphenhydrAMINE Injectable 50 milliGRAM(s) IntraMuscular once PRN EPS ppx.  ibuprofen  Tablet. 400 milliGRAM(s) Oral every 6 hours PRN Mild Pain (1 - 3), Moderate Pain (4 - 6)  LORazepam     Tablet 1 milliGRAM(s) Oral every 6 hours PRN anxiety

## 2023-07-18 NOTE — BH INPATIENT PSYCHIATRY PROGRESS NOTE - CURRENT MEDICATION
MEDICATIONS  (STANDING):  atorvastatin 10 milliGRAM(s) Oral at bedtime  clonazePAM  Tablet 0.5 milliGRAM(s) Oral two times a day  divalproex  milliGRAM(s) Oral daily  divalproex DR 1000 milliGRAM(s) Oral at bedtime  gabapentin 400 milliGRAM(s) Oral three times a day  guanFACINE. 1 milliGRAM(s) Oral <User Schedule>  insulin glargine Injectable (LANTUS) 15 Unit(s) SubCutaneous at bedtime  insulin lispro (ADMELOG) corrective regimen sliding scale   SubCutaneous at bedtime  insulin lispro (ADMELOG) corrective regimen sliding scale   SubCutaneous three times a day before meals  levothyroxine 50 MICROGram(s) Oral daily  loratadine 10 milliGRAM(s) Oral daily  metFORMIN 1000 milliGRAM(s) Oral two times a day with meals  mirtazapine 30 milliGRAM(s) Oral at bedtime  pantoprazole    Tablet 40 milliGRAM(s) Oral before breakfast  risperiDONE   Tablet 3 milliGRAM(s) Oral two times a day  senna 2 Tablet(s) Oral at bedtime  tamsulosin 0.4 milliGRAM(s) Oral at bedtime    MEDICATIONS  (PRN):  bacitracin   Ointment 1 Application(s) Topical three times a day PRN wound  chlorproMAZINE    Injectable 50 milliGRAM(s) IntraMuscular once PRN severe agitation  chlorproMAZINE    Tablet 50 milliGRAM(s) Oral every 6 hours PRN agitation  diphenhydrAMINE 50 milliGRAM(s) Oral every 6 hours PRN EPS ppx.  diphenhydrAMINE Injectable 50 milliGRAM(s) IntraMuscular once PRN EPS ppx.  LORazepam     Tablet 1 milliGRAM(s) Oral every 6 hours PRN anxiety   MEDICATIONS  (STANDING):  atorvastatin 10 milliGRAM(s) Oral at bedtime  clonazePAM  Tablet 0.5 milliGRAM(s) Oral two times a day  divalproex  milliGRAM(s) Oral daily  divalproex DR 1000 milliGRAM(s) Oral at bedtime  gabapentin 400 milliGRAM(s) Oral three times a day  guanFACINE. 1 milliGRAM(s) Oral <User Schedule>  insulin lispro (ADMELOG) corrective regimen sliding scale   SubCutaneous at bedtime  insulin lispro (ADMELOG) corrective regimen sliding scale   SubCutaneous three times a day before meals  levothyroxine 50 MICROGram(s) Oral daily  loratadine 10 milliGRAM(s) Oral daily  metFORMIN 1000 milliGRAM(s) Oral two times a day with meals  mirtazapine 30 milliGRAM(s) Oral at bedtime  pantoprazole    Tablet 40 milliGRAM(s) Oral before breakfast  risperiDONE   Tablet 3 milliGRAM(s) Oral two times a day  senna 2 Tablet(s) Oral at bedtime  tamsulosin 0.4 milliGRAM(s) Oral at bedtime    MEDICATIONS  (PRN):  bacitracin   Ointment 1 Application(s) Topical three times a day PRN wound  chlorproMAZINE    Injectable 50 milliGRAM(s) IntraMuscular once PRN severe agitation  chlorproMAZINE    Tablet 50 milliGRAM(s) Oral every 6 hours PRN agitation  diphenhydrAMINE 50 milliGRAM(s) Oral every 6 hours PRN EPS ppx.  diphenhydrAMINE Injectable 50 milliGRAM(s) IntraMuscular once PRN EPS ppx.  ibuprofen  Tablet. 400 milliGRAM(s) Oral every 6 hours PRN Mild Pain (1 - 3), Moderate Pain (4 - 6)  LORazepam     Tablet 1 milliGRAM(s) Oral every 6 hours PRN anxiety

## 2023-07-18 NOTE — BH CHART NOTE - NSEVENTNOTEFT_PSY_ALL_CORE
Interval History:  CAROL paged at 17:27 for patient with complaint of testicular pain. On arrival, patient is found standing at nursing station in [facility]. Patient interviewed and examined in treatment room. Patient reports testicular pain that worsens when he sits or lies down and improves with standing. Patient reports dysuria and blood in his urine this AM, which he reports not telling nursing staff about. States that he wants to go to the ED because he had an US before and wants a repeat US.    T: 98.3 F  HR: 106   BP: 118/77  RR: -  SpO2: --    Physical Exam:  Gen: Obese patient in [facility]   HEENT: NC/AT,  EOMI.    Resp: CTA b/l. No wheezes, rhonchi, or crackles.   CV: Radial pulses 2+ b/l, no murmurs.   Abd: distended, NTND, no guarding or rigidity, no suprapubic tenderness  Neuro: alert, grossly oriented  : TTP, non-erythematous, non-edematous, non-high riding R testicle, s/p L orchiectomy  Psych: euthymic/anxious affect with odd smirk    Assessment:  CAROL called for testicular pain. Pt mildly tachycardic but otherwise vitally stable. Patient reports pain to palpation but no other concerning signs evident on exam. Chart review shows testicular US on 7/9/23 with a 3 mm hypoechoic focus is seen in the region of the right mediastinum testis with recommended outpatient follow up.     Plan:  1. Ibuprofen 600 mg q6h ordered for pain  2. PRN for anxiety offered  3. Patient informed to let staff know if pain does not improve or worsens  4. d/w RN staff    Interval History:  CAROL paged at 17:27 for patient with complaint of testicular pain. On arrival, patient is found standing at nursing station in [facility]. Patient interviewed and examined in treatment room. Patient reports testicular pain that worsens when he sits or lies down and improves with standing. Patient reports dysuria and blood in his urine this AM, which he reports not telling nursing staff about. States that he wants to go to the ED because he had an US before and wants a repeat US.    T: 98.3 F  HR: 106   BP: 118/77  RR: -  SpO2: --    Physical Exam:  Gen: Obese patient in [facility]   HEENT: NC/AT, EOMI.    Resp: CTA b/l. No wheezes, rhonchi, or crackles.   CV: Radial pulses 2+ b/l, no murmurs.   Abd: distended, NTND, no guarding or rigidity, no suprapubic tenderness  Neuro: alert, grossly oriented  : TTP, non-erythematous, non-edematous, non-high riding R testicle, s/p L orchiectomy  Psych: euthymic/anxious affect with odd smirk    Assessment:  CAROL called for testicular pain. Pt mildly tachycardic but otherwise vitally stable. Patient reports pain to palpation but no other concerning signs evident on exam. Chart review shows testicular US on 7/9/23 with a 3 mm hypoechoic focus is seen in the region of the right mediastinum testis with recommended outpatient follow up.     Plan:  1. Ibuprofen 600 mg q6h ordered for pain  2. PRN for anxiety offered  3. Urinalysis ordered  3. Patient informed to let staff know if pain does not improve or worsens  4. d/w RN staff

## 2023-07-18 NOTE — ED ADULT NURSE NOTE - CINV DISCH TEACH PARTICIP
No care due was identified.  [facility] Embedded Care Due Messages. Reference number: [id number].   7/17/2023 5:07:41 PM CDT  
Please see the attached refill request.  
HHA/Caregiver

## 2023-07-18 NOTE — BH INPATIENT PSYCHIATRY PROGRESS NOTE - NSBHCHARTREVIEWVS_PSY_A_CORE FT
Vital Signs Last 24 Hrs  T(C): 36.7 (07-18-23 @ 07:15), Max: 36.7 (07-18-23 @ 07:15)  T(F): 98 (07-18-23 @ 07:15), Max: 98 (07-18-23 @ 07:15)  HR: --  BP: --  BP(mean): --  RR: 16 (07-17-23 @ 21:13) (16 - 16)  SpO2: --    Orthostatic VS  07-18-23 @ 07:15  Lying BP: --/-- HR: --  Sitting BP: 110/75 HR: 86  Standing BP: 124/80 HR: 90  Site: --  Mode: electronic  Orthostatic VS  07-17-23 @ 08:20  Lying BP: --/-- HR: --  Sitting BP: 116/75 HR: 89  Standing BP: --/-- HR: --  Site: --  Mode: electronic  Orthostatic VS  07-16-23 @ 19:39  Lying BP: --/-- HR: --  Sitting BP: 95/66 HR: 94  Standing BP: 118/73 HR: 103  Site: --  Mode: --   Vital Signs Last 24 Hrs  T(C): 36.7 (07-19-23 @ 11:46), Max: 36.8 (07-19-23 @ 08:40)  T(F): 98.1 (07-19-23 @ 11:46), Max: 98.2 (07-19-23 @ 08:40)  HR: 88 (07-19-23 @ 11:46) (88 - 97)  BP: 116/76 (07-19-23 @ 11:46) (116/76 - 124/70)  BP(mean): --  RR: 16 (07-19-23 @ 11:46) (16 - 16)  SpO2: 99% (07-19-23 @ 11:46) (99% - 99%)    Orthostatic VS  07-18-23 @ 07:15  Lying BP: --/-- HR: --  Sitting BP: 110/75 HR: 86  Standing BP: 124/80 HR: 90  Site: --  Mode: electronic

## 2023-07-18 NOTE — BH INPATIENT PSYCHIATRY PROGRESS NOTE - NSBHMETABOLIC_PSY_ALL_CORE_FT
BMI: BMI (kg/m2): 40.3 (07-13-23 @ 15:45)  HbA1c: A1C with Estimated Average Glucose Result: 6.7 % (06-24-23 @ 06:10)    Glucose: POCT Blood Glucose.: 150 mg/dL (07-18-23 @ 16:27)    BP: 104/64 (07-16-23 @ 08:45) (104/64 - 104/64)  Lipid Panel: Date/Time: 06-24-23 @ 06:10  Cholesterol, Serum: 118  Direct LDL: --  HDL Cholesterol, Serum: 25  Total Cholesterol/HDL Ration Measurement: --  Triglycerides, Serum: 155   BMI: BMI (kg/m2): 40.3 (07-19-23 @ 11:46)  HbA1c: A1C with Estimated Average Glucose Result: 6.7 % (06-24-23 @ 06:10)    Glucose: POCT Blood Glucose.: 146 mg/dL (07-19-23 @ 07:45)    BP: 124/70 (07-19-23 @ 08:40) (124/70 - 124/70)  Lipid Panel: Date/Time: 06-24-23 @ 06:10  Cholesterol, Serum: 118  Direct LDL: --  HDL Cholesterol, Serum: 25  Total Cholesterol/HDL Ration Measurement: --  Triglycerides, Serum: 155

## 2023-07-19 ENCOUNTER — EMERGENCY (EMERGENCY)
Facility: HOSPITAL | Age: 33
LOS: 1 days | Discharge: HOME | End: 2023-07-19
Attending: EMERGENCY MEDICINE | Admitting: EMERGENCY MEDICINE
Payer: MEDICAID

## 2023-07-19 VITALS
TEMPERATURE: 98 F | SYSTOLIC BLOOD PRESSURE: 116 MMHG | HEART RATE: 88 BPM | RESPIRATION RATE: 16 BRPM | OXYGEN SATURATION: 99 % | HEIGHT: 71 IN | DIASTOLIC BLOOD PRESSURE: 76 MMHG

## 2023-07-19 VITALS
TEMPERATURE: 98 F | OXYGEN SATURATION: 99 % | SYSTOLIC BLOOD PRESSURE: 132 MMHG | RESPIRATION RATE: 16 BRPM | HEART RATE: 72 BPM | DIASTOLIC BLOOD PRESSURE: 86 MMHG

## 2023-07-19 DIAGNOSIS — Z90.79 ACQUIRED ABSENCE OF OTHER GENITAL ORGAN(S): Chronic | ICD-10-CM

## 2023-07-19 PROBLEM — E11.9 TYPE 2 DIABETES MELLITUS WITHOUT COMPLICATIONS: Chronic | Status: ACTIVE | Noted: 2023-07-14

## 2023-07-19 PROBLEM — C62.90 MALIGNANT NEOPLASM OF UNSPECIFIED TESTIS, UNSPECIFIED WHETHER DESCENDED OR UNDESCENDED: Chronic | Status: ACTIVE | Noted: 2023-07-14

## 2023-07-19 LAB
APPEARANCE UR: CLEAR — SIGNIFICANT CHANGE UP
BILIRUB UR-MCNC: NEGATIVE — SIGNIFICANT CHANGE UP
COLOR SPEC: YELLOW — SIGNIFICANT CHANGE UP
DIFF PNL FLD: NEGATIVE — SIGNIFICANT CHANGE UP
GLUCOSE BLDC GLUCOMTR-MCNC: 146 MG/DL — HIGH (ref 70–99)
GLUCOSE BLDC GLUCOMTR-MCNC: 201 MG/DL — HIGH (ref 70–99)
GLUCOSE UR QL: NEGATIVE MG/DL — SIGNIFICANT CHANGE UP
KETONES UR-MCNC: ABNORMAL MG/DL
LEUKOCYTE ESTERASE UR-ACNC: NEGATIVE — SIGNIFICANT CHANGE UP
NITRITE UR-MCNC: NEGATIVE — SIGNIFICANT CHANGE UP
PH UR: 6 — SIGNIFICANT CHANGE UP (ref 5–8)
PROT UR-MCNC: NEGATIVE MG/DL — SIGNIFICANT CHANGE UP
SP GR SPEC: 1.02 — SIGNIFICANT CHANGE UP (ref 1–1.03)
UROBILINOGEN FLD QL: 0.2 MG/DL — SIGNIFICANT CHANGE UP (ref 0.2–1)

## 2023-07-19 PROCEDURE — 99232 SBSQ HOSP IP/OBS MODERATE 35: CPT

## 2023-07-19 PROCEDURE — 76870 US EXAM SCROTUM: CPT | Mod: 26

## 2023-07-19 PROCEDURE — 99284 EMERGENCY DEPT VISIT MOD MDM: CPT

## 2023-07-19 RX ORDER — KETOROLAC TROMETHAMINE 30 MG/ML
30 SYRINGE (ML) INJECTION ONCE
Refills: 0 | Status: DISCONTINUED | OUTPATIENT
Start: 2023-07-19 | End: 2023-07-19

## 2023-07-19 RX ADMIN — DIVALPROEX SODIUM 1000 MILLIGRAM(S): 500 TABLET, DELAYED RELEASE ORAL at 20:31

## 2023-07-19 RX ADMIN — Medication 30 MILLIGRAM(S): at 15:52

## 2023-07-19 RX ADMIN — RISPERIDONE 3 MILLIGRAM(S): 4 TABLET ORAL at 20:31

## 2023-07-19 RX ADMIN — TAMSULOSIN HYDROCHLORIDE 0.4 MILLIGRAM(S): 0.4 CAPSULE ORAL at 20:31

## 2023-07-19 RX ADMIN — Medication 50 MILLIGRAM(S): at 20:31

## 2023-07-19 RX ADMIN — GABAPENTIN 400 MILLIGRAM(S): 400 CAPSULE ORAL at 08:17

## 2023-07-19 RX ADMIN — GUANFACINE 1 MILLIGRAM(S): 3 TABLET, EXTENDED RELEASE ORAL at 08:17

## 2023-07-19 RX ADMIN — DIVALPROEX SODIUM 500 MILLIGRAM(S): 500 TABLET, DELAYED RELEASE ORAL at 08:17

## 2023-07-19 RX ADMIN — GABAPENTIN 400 MILLIGRAM(S): 400 CAPSULE ORAL at 20:31

## 2023-07-19 RX ADMIN — Medication 0.5 MILLIGRAM(S): at 08:18

## 2023-07-19 RX ADMIN — MIRTAZAPINE 30 MILLIGRAM(S): 45 TABLET, ORALLY DISINTEGRATING ORAL at 20:31

## 2023-07-19 RX ADMIN — PANTOPRAZOLE SODIUM 40 MILLIGRAM(S): 20 TABLET, DELAYED RELEASE ORAL at 08:17

## 2023-07-19 RX ADMIN — Medication 50 MICROGRAM(S): at 05:50

## 2023-07-19 RX ADMIN — METFORMIN HYDROCHLORIDE 1000 MILLIGRAM(S): 850 TABLET ORAL at 08:18

## 2023-07-19 RX ADMIN — GUANFACINE 1 MILLIGRAM(S): 3 TABLET, EXTENDED RELEASE ORAL at 20:44

## 2023-07-19 RX ADMIN — Medication 400 MILLIGRAM(S): at 06:06

## 2023-07-19 RX ADMIN — SENNA PLUS 2 TABLET(S): 8.6 TABLET ORAL at 20:31

## 2023-07-19 RX ADMIN — Medication 0.5 MILLIGRAM(S): at 20:32

## 2023-07-19 RX ADMIN — ATORVASTATIN CALCIUM 10 MILLIGRAM(S): 80 TABLET, FILM COATED ORAL at 20:31

## 2023-07-19 RX ADMIN — RISPERIDONE 3 MILLIGRAM(S): 4 TABLET ORAL at 08:18

## 2023-07-19 RX ADMIN — LORATADINE 10 MILLIGRAM(S): 10 TABLET ORAL at 08:17

## 2023-07-19 NOTE — BH INPATIENT PSYCHIATRY PROGRESS NOTE - NSBHMETABOLIC_PSY_ALL_CORE_FT
BMI: BMI (kg/m2): 40.3 (07-19-23 @ 11:46)  HbA1c: A1C with Estimated Average Glucose Result: 6.7 % (06-24-23 @ 06:10)    Glucose: POCT Blood Glucose.: 146 mg/dL (07-19-23 @ 07:45)    BP: 124/70 (07-19-23 @ 08:40) (124/70 - 124/70)  Lipid Panel: Date/Time: 06-24-23 @ 06:10  Cholesterol, Serum: 118  Direct LDL: --  HDL Cholesterol, Serum: 25  Total Cholesterol/HDL Ration Measurement: --  Triglycerides, Serum: 155

## 2023-07-19 NOTE — BH INPATIENT PSYCHIATRY PROGRESS NOTE - CURRENT MEDICATION
MEDICATIONS  (STANDING):  atorvastatin 10 milliGRAM(s) Oral at bedtime  clonazePAM  Tablet 0.5 milliGRAM(s) Oral two times a day  divalproex  milliGRAM(s) Oral daily  divalproex DR 1000 milliGRAM(s) Oral at bedtime  gabapentin 400 milliGRAM(s) Oral three times a day  guanFACINE. 1 milliGRAM(s) Oral <User Schedule>  insulin lispro (ADMELOG) corrective regimen sliding scale   SubCutaneous at bedtime  insulin lispro (ADMELOG) corrective regimen sliding scale   SubCutaneous three times a day before meals  levothyroxine 50 MICROGram(s) Oral daily  loratadine 10 milliGRAM(s) Oral daily  metFORMIN 1000 milliGRAM(s) Oral two times a day with meals  mirtazapine 30 milliGRAM(s) Oral at bedtime  pantoprazole    Tablet 40 milliGRAM(s) Oral before breakfast  risperiDONE   Tablet 3 milliGRAM(s) Oral two times a day  senna 2 Tablet(s) Oral at bedtime  tamsulosin 0.4 milliGRAM(s) Oral at bedtime    MEDICATIONS  (PRN):  bacitracin   Ointment 1 Application(s) Topical three times a day PRN wound  chlorproMAZINE    Injectable 50 milliGRAM(s) IntraMuscular once PRN severe agitation  chlorproMAZINE    Tablet 50 milliGRAM(s) Oral every 6 hours PRN agitation  diphenhydrAMINE 50 milliGRAM(s) Oral every 6 hours PRN EPS ppx.  diphenhydrAMINE Injectable 50 milliGRAM(s) IntraMuscular once PRN EPS ppx.  ibuprofen  Tablet. 400 milliGRAM(s) Oral every 6 hours PRN Mild Pain (1 - 3), Moderate Pain (4 - 6)  LORazepam     Tablet 1 milliGRAM(s) Oral every 6 hours PRN anxiety

## 2023-07-19 NOTE — ED ADULT NURSE NOTE - CHIEF COMPLAINT QUOTE
pt coming from [facility], has hx of testicular CA with removal of left testicle, c/o left testicular pain x 2 days. pt c/o dysuria.

## 2023-07-19 NOTE — ED ADULT TRIAGE NOTE - NS ED NURSE AMBULANCES
1. Have you been to the ER, urgent care clinic since your last visit? Hospitalized since your last visit? No    2. Have you seen or consulted any other health care providers outside of the [address] [place] [address] since your last visit? Include any pap smears or colon screening.  No    Chief Complaint   Patient presents with    Hypogonadism    Diabetes     Visit Vitals  /77 (BP 1 Location: Left upper arm, BP Patient Position: Sitting, BP Cuff Size: Large adult)   Pulse 80   Temp 98 °F (36.7 °C) (Temporal)   Ht 6' 3\" (1.905 m)   Wt 323 lb 14.4 oz (146.9 kg)   SpO2 96%   BMI 40.48 kg/m² [facility]

## 2023-07-19 NOTE — ED PROVIDER NOTE - ATTENDING APP SHARED VISIT CONTRIBUTION OF CARE
DR. TRINIDAD, ATTENDING MD-  I personally saw the patient with the PA and performed a substantive portion of the visit including all aspects of the medical decision making.    32 y/o male h/o testicular cancer at left with orchiectomy sent from [facility] for c/o r testicle pain and dysuria.  On exam, chaperone PA Shahbaz, pt has induration and tenderness over epididymus.  Will evaluate for epididymitis, orchitis, uti.  Obtain ua ucx u/s testicle give pain med reassess.

## 2023-07-19 NOTE — ED PROVIDER NOTE - PHYSICAL EXAMINATION
Well appearing, obese, awake, alert, oriented to person, place, time/situation and in no apparent distress. here with [facility] staff     Airway patent    Eyes without scleral injection. No jaundice.    Strong pulse.    Respirations unlabored.    Abdomen soft, suprapubic TTP, right testicular TTP with epididymal induration.     Spine appears normal, range of motion is not limited, no muscle or joint tenderness.    Alert and oriented, no gross motor or sensory deficits    Skin normal color for race, warm, dry and intact. No evidence of rash.

## 2023-07-19 NOTE — ED ADULT NURSE NOTE - NSFALLUNIVINTERV_ED_ALL_ED
Bed/Stretcher in lowest position, wheels locked, appropriate side rails in place/Call bell, personal items and telephone in reach/Instruct patient to call for assistance before getting out of bed/chair/stretcher/Non-slip footwear applied when patient is off stretcher/[place] to call system/Physically safe environment - no spills, clutter or unnecessary equipment/Purposeful proactive rounding/Room/bathroom lighting operational, light cord in reach

## 2023-07-19 NOTE — ED ADULT NURSE REASSESSMENT NOTE - NS ED NURSE REASSESS COMMENT FT1
Patient to go back to [facility] at this time, left with security to go back to previous unit he was transferred from. DC paperwork provided by MD. Charge RN aware.
Patient resting in stretcher, no acute distress noted at this time. Patient updated on plan of care, will continue to monitor.
Break Relief RN: Patient to be discharged at this time. Patient awaiting discharge paperwork by MD. Bach4, ambulatory with steady gait, accompanied by [facility] staff. No IV was placed in patient. RR equal and unlabored. Care plan continued. Comfort measures provided.  Safety maintained.

## 2023-07-19 NOTE — ED ADULT TRIAGE NOTE - CHIEF COMPLAINT QUOTE
pt coming from [facility], has hx of testicular CA with removal of left testicle, c/o left testicular pain x 2 days. pt c/o dysuria.

## 2023-07-19 NOTE — ED PROVIDER NOTE - OBJECTIVE STATEMENT
33-year-old male with past medical history of insulin-dependent diabetes, GERD, intellectual disability, autism, impulse control disorder presents to the ER from inpatient [facility], admitted there for self injures behavior, here in the ER today for worsening right testicular pain x3 days reports of dysuria x1 month.  Patient was seen here approximately 10 days ago where ultrasound was performed patient notes that he was told there was a mass on his right testicle and that it needed to be removed.  Patient has been taking NSAIDs without relief of this pain, sent in from [facility] for further evaluation.  Denies fevers, retention, hematuria, vomiting, chest pain, shortness of breath.

## 2023-07-19 NOTE — BH INPATIENT PSYCHIATRY PROGRESS NOTE - NSBHCHARTREVIEWVS_PSY_A_CORE FT
Vital Signs Last 24 Hrs  T(C): 36.7 (07-19-23 @ 11:46), Max: 36.8 (07-19-23 @ 08:40)  T(F): 98.1 (07-19-23 @ 11:46), Max: 98.2 (07-19-23 @ 08:40)  HR: 88 (07-19-23 @ 11:46) (88 - 97)  BP: 116/76 (07-19-23 @ 11:46) (116/76 - 124/70)  BP(mean): --  RR: 16 (07-19-23 @ 11:46) (16 - 16)  SpO2: 99% (07-19-23 @ 11:46) (99% - 99%)    Orthostatic VS  07-18-23 @ 07:15  Lying BP: --/-- HR: --  Sitting BP: 110/75 HR: 86  Standing BP: 124/80 HR: 90  Site: --  Mode: electronic

## 2023-07-19 NOTE — ED PROVIDER NOTE - PATIENT PORTAL LINK FT
You can access the FollowMyHealth Patient Portal offered by [facility] by registering at the following website: [email or web address]. By joining [facility]’s FollowMyHealth portal, you will also be able to view your health information using other applications (apps) compatible with our system.

## 2023-07-19 NOTE — ED PROVIDER NOTE - NSFOLLOWUPCLINICS_GEN_ALL_ED_FT
[facility] [place] [facility] [place]  Urology  [address] [place] [address], [place] [address]  Phone: [phone]  Fax:

## 2023-07-19 NOTE — ED PROVIDER NOTE - CLINICAL SUMMARY MEDICAL DECISION MAKING FREE TEXT BOX
33-year-old male with past medical history of insulin-dependent diabetes, GERD, intellectual disability, autism, impulse control disorder presents to the ER from inpatient [facility], admitted there for self injures behavior, here in the ER today for worsening right testicular pain x3 days reports of dysuria x1 month.  Patient was seen here approximately 10 days ago where ultrasound was performed patient notes that he was told there was a mass on his right testicle and that it needed to be removed.  Patient has been taking NSAIDs without relief of this pain, sent in from [facility] for further evaluation.    labs, UA, US testicles, pain control

## 2023-07-19 NOTE — ED PROVIDER NOTE - PROGRESS NOTE DETAILS
Discussed ultrasound testicle results with urology who reviewed findings, given patient's history would recommend follow-up with them in office soonest available appointment however no other acute management or intervention. US impression: Subcentimeter hypoechoic mediastinal lesion in the right testis, likely   tubular ectasia of the rete testis or mediastinal cyst. Short-term   sonographic follow-up is recommended to assess for stability.  Small right epididymal cyst.    Patient feels improved. Asking to get dressed.
Fall Risk

## 2023-07-19 NOTE — ED ADULT NURSE NOTE - OBJECTIVE STATEMENT
Patient A&o X3, from [facility], received in intake, with complaints of right testicular pain. Patient states, "I have testicular cancer and I had to get the left one removed and now the right one is hurting". Patient denies any psych issues at this time. Patient complains of right testicular pain rating 5 out of 10 currently, unsure if he received pain medication pta. Patient able to speak in clear sentences, respirations equal and unlabored. Lung sounds clear b/l, equal chest rise and fall noted. Denies CP/SOB, fever, chills, nausea, vomiting and diarrhea at this time. Skin warm and dry. Provider at bedside for eval, pending further orders.

## 2023-07-19 NOTE — ED ADULT TRIAGE NOTE - SOURCE OF INFORMATION
Problem: Patient Care Overview  Goal: Plan of Care Review  Outcome: Ongoing (interventions implemented as appropriate)  POC discussed w/patient and spouse, verbalized understanding. NSR on monitor. VSS. Voids per brief. Pt turned q 2. No c/o pain.  . Fall precautions in place, bed alarm on. Patient denies needs at this time.        Patient

## 2023-07-19 NOTE — BH CHART NOTE - NSEVENTNOTEFT_PSY_ALL_CORE
[facility] Inpatient to ED Transfer Summary    Reason for Transfer/Medical Summary: Patient is a 33 year old male, with PMHx significant for asthma, DM, urinary retention, GERD, BPH, hypothyroidism, HLD, HTN, b/l myopia, and testicular cancer, left orchiectomy, recent admission for PNA. Patient is endorsing worsening testicular pain, states that he is unable to sit or lie down, unrelieved with oral PRN analgesics. He also self reports dysuria and blood tinged urine yesterday. Patient requires transfer to ED for further workup of testicular pain.      PAST MEDICAL & SURGICAL HISTORY:  Intellectual disability    Obesity    Asthma    GERD (gastroesophageal reflux disease)    Factitious disorder    Urinary retention    Enuresis    Myopia of both eyes    Autism    Hypothyroid    History of BPH    HLD (hyperlipidemia)    Impulse control disorder    Type 2 diabetes mellitus    Testicular cancer    History of orchiectomy          Allergies    Zyprexa (Dystonic RXN)  Haldol (Rash)  Zyprexa (Unknown)  Bee stings (Unknown)  Haldol (Other)    Intolerances  None known      MEDICATIONS  (STANDING):  atorvastatin 10 milliGRAM(s) Oral at bedtime  clonazePAM  Tablet 0.5 milliGRAM(s) Oral two times a day  divalproex  milliGRAM(s) Oral daily  divalproex DR 1000 milliGRAM(s) Oral at bedtime  gabapentin 400 milliGRAM(s) Oral three times a day  guanFACINE. 1 milliGRAM(s) Oral <User Schedule>  insulin glargine Injectable (LANTUS) 15 Unit(s) SubCutaneous at bedtime  insulin lispro (ADMELOG) corrective regimen sliding scale   SubCutaneous at bedtime  insulin lispro (ADMELOG) corrective regimen sliding scale   SubCutaneous three times a day before meals  levothyroxine 50 MICROGram(s) Oral daily  loratadine 10 milliGRAM(s) Oral daily  metFORMIN 1000 milliGRAM(s) Oral two times a day with meals  mirtazapine 30 milliGRAM(s) Oral at bedtime  pantoprazole    Tablet 40 milliGRAM(s) Oral before breakfast  risperiDONE   Tablet 3 milliGRAM(s) Oral two times a day  senna 2 Tablet(s) Oral at bedtime  tamsulosin 0.4 milliGRAM(s) Oral at bedtime    MEDICATIONS  (PRN):  bacitracin   Ointment 1 Application(s) Topical three times a day PRN wound  chlorproMAZINE    Injectable 50 milliGRAM(s) IntraMuscular once PRN severe agitation  chlorproMAZINE    Tablet 50 milliGRAM(s) Oral every 6 hours PRN agitation  diphenhydrAMINE 50 milliGRAM(s) Oral every 6 hours PRN EPS ppx.  diphenhydrAMINE Injectable 50 milliGRAM(s) IntraMuscular once PRN EPS ppx.  ibuprofen  Tablet. 400 milliGRAM(s) Oral every 6 hours PRN Mild Pain (1 - 3), Moderate Pain (4 - 6)  LORazepam     Tablet 1 milliGRAM(s) Oral every 6 hours PRN anxiety      Vital Signs Last 24 Hrs  T(C): 36.8 (2023 08:40), Max: 36.8 (2023 08:40)  T(F): 98.2 (2023 08:40), Max: 98.2 (2023 08:40)  HR: 97 (2023 08:40) (97 - 97)  BP: 124/70 (2023 08:40) (124/70 - 124/70)  BP(mean): --  RR: --  SpO2: --      CAPILLARY BLOOD GLUCOSE      POCT Blood Glucose.: 146 mg/dL (2023 07:45)  POCT Blood Glucose.: 130 mg/dL (2023 19:46)  POCT Blood Glucose.: 150 mg/dL (2023 16:27)  POCT Blood Glucose.: 104 mg/dL (2023 11:40)        PHYSICAL EXAM:  GENERAL: NAD, well-developed  HEAD:  Atraumatic, Normocephalic  EYES: EOMI, PERRLA, conjunctiva and sclera clear  NECK: Supple, No JVD  CHEST/LUNG: Clear to auscultation bilaterally; No wheeze  HEART: Regular rate and rhythm; No murmurs, rubs, or gallops  ABDOMEN: Soft, Nontender, Nondistended; Bowel sounds present  EXTREMITIES:  2+ Peripheral Pulses, No clubbing, cyanosis, or edema  PSYCH: AAOx3  NEUROLOGY: non-focal  SKIN: No rashes or lesions    LABS:                Urinalysis Basic - ( 2023 20:30 )    Color: Dark Yellow / Appearance: Clear / S.033 / pH: x  Gluc: x / Ketone: Trace mg/dL  / Bili: Negative / Urobili: 1.0 mg/dL   Blood: x / Protein: Trace mg/dL / Nitrite: Negative   Leuk Esterase: Negative / RBC: x / WBC x   Sq Epi: x / Non Sq Epi: x / Bacteria: x        Psychiatry Section:  Psychiatric Summary/[facility] admitting diagnosis:    Psychiatric Recommendations:    Observation status (check one):   (X) Constant Observation  ( ) Enhanced care  ( ) Routine checks    Risk Status (check all that apply if present):  (X) at risk for suicide/self-injury  ( ) at risk for aggressive behavior  (X) at risk for elopement  ( ) other risk:

## 2023-07-19 NOTE — BH INPATIENT PSYCHIATRY PROGRESS NOTE - PRN MEDS
MEDICATIONS  (PRN):  bacitracin   Ointment 1 Application(s) Topical three times a day PRN wound  chlorproMAZINE    Injectable 50 milliGRAM(s) IntraMuscular once PRN severe agitation  chlorproMAZINE    Tablet 50 milliGRAM(s) Oral every 6 hours PRN agitation  diphenhydrAMINE 50 milliGRAM(s) Oral every 6 hours PRN EPS ppx.  diphenhydrAMINE Injectable 50 milliGRAM(s) IntraMuscular once PRN EPS ppx.  ibuprofen  Tablet. 400 milliGRAM(s) Oral every 6 hours PRN Mild Pain (1 - 3), Moderate Pain (4 - 6)  LORazepam     Tablet 1 milliGRAM(s) Oral every 6 hours PRN anxiety

## 2023-07-19 NOTE — ED PROVIDER NOTE - NSICDXPASTMEDICALHX_GEN_ALL_CORE_FT
PAST MEDICAL HISTORY:  Asthma     Autism     Enuresis     Factitious disorder     GERD (gastroesophageal reflux disease)     History of BPH     HLD (hyperlipidemia)     Hypothyroid     Impulse control disorder     Intellectual disability     Myopia of both eyes     Obesity     Testicular cancer     Type 2 diabetes mellitus     Urinary retention

## 2023-07-19 NOTE — ED PROVIDER NOTE - NSFOLLOWUPINSTRUCTIONS_ED_ALL_ED_FT
Take Motrin 600mg every 8hrs with food for pain.   Take Tylenol 650mg 6 hours as needed for pain.    Please call Urology, [phone] to make an appointment for soonest available Physician for follow up.      Return to ER for any worsening pain, swelling, fevers.

## 2023-07-20 LAB
CULTURE RESULTS: NO GROWTH — SIGNIFICANT CHANGE UP
GLUCOSE BLDC GLUCOMTR-MCNC: 114 MG/DL — HIGH (ref 70–99)
GLUCOSE BLDC GLUCOMTR-MCNC: 123 MG/DL — HIGH (ref 70–99)
GLUCOSE BLDC GLUCOMTR-MCNC: 156 MG/DL — HIGH (ref 70–99)
GLUCOSE BLDC GLUCOMTR-MCNC: 174 MG/DL — HIGH (ref 70–99)
SPECIMEN SOURCE: SIGNIFICANT CHANGE UP

## 2023-07-20 PROCEDURE — 99232 SBSQ HOSP IP/OBS MODERATE 35: CPT

## 2023-07-20 RX ORDER — GUANFACINE 3 MG/1
2 TABLET, EXTENDED RELEASE ORAL AT BEDTIME
Refills: 0 | Status: DISCONTINUED | OUTPATIENT
Start: 2023-07-20 | End: 2023-07-21

## 2023-07-20 RX ORDER — GUANFACINE 3 MG/1
1 TABLET, EXTENDED RELEASE ORAL
Qty: 28 | Refills: 0
Start: 2023-07-20 | End: 2023-08-02

## 2023-07-20 RX ORDER — SENNA PLUS 8.6 MG/1
2 TABLET ORAL
Qty: 60 | Refills: 0
Start: 2023-07-20 | End: 2023-08-18

## 2023-07-20 RX ORDER — HYDROXYZINE HCL 10 MG
1 TABLET ORAL
Qty: 56 | Refills: 0
Start: 2023-07-20 | End: 2023-08-02

## 2023-07-20 RX ORDER — DIVALPROEX SODIUM 500 MG/1
1 TABLET, DELAYED RELEASE ORAL
Qty: 42 | Refills: 0
Start: 2023-07-20 | End: 2023-08-02

## 2023-07-20 RX ORDER — METFORMIN HYDROCHLORIDE 850 MG/1
1 TABLET ORAL
Qty: 28 | Refills: 0
Start: 2023-07-20 | End: 2023-08-02

## 2023-07-20 RX ORDER — DIVALPROEX SODIUM 500 MG/1
2 TABLET, DELAYED RELEASE ORAL
Qty: 0 | Refills: 0 | DISCHARGE
Start: 2023-07-20

## 2023-07-20 RX ORDER — LORATADINE 10 MG/1
1 TABLET ORAL
Qty: 14 | Refills: 0
Start: 2023-07-20 | End: 2023-08-02

## 2023-07-20 RX ORDER — TAMSULOSIN HYDROCHLORIDE 0.4 MG/1
1 CAPSULE ORAL
Qty: 14 | Refills: 0
Start: 2023-07-20 | End: 2023-08-02

## 2023-07-20 RX ORDER — GABAPENTIN 400 MG/1
1 CAPSULE ORAL
Qty: 42 | Refills: 0
Start: 2023-07-20 | End: 2023-08-02

## 2023-07-20 RX ORDER — PANTOPRAZOLE SODIUM 20 MG/1
1 TABLET, DELAYED RELEASE ORAL
Qty: 14 | Refills: 0
Start: 2023-07-20 | End: 2023-08-02

## 2023-07-20 RX ORDER — LEVOTHYROXINE SODIUM 125 MCG
1 TABLET ORAL
Qty: 14 | Refills: 0
Start: 2023-07-20 | End: 2023-08-02

## 2023-07-20 RX ORDER — LORATADINE 10 MG/1
1 CAPSULE, LIQUID FILLED ORAL
Qty: 14 | Refills: 0 | DISCHARGE
Start: 2023-07-20 | End: 2023-08-02

## 2023-07-20 RX ORDER — ATORVASTATIN CALCIUM 80 MG/1
1 TABLET, FILM COATED ORAL
Qty: 14 | Refills: 0
Start: 2023-07-20 | End: 2023-08-02

## 2023-07-20 RX ORDER — HYDROXYZINE HCL 10 MG
50 TABLET ORAL EVERY 6 HOURS
Refills: 0 | Status: DISCONTINUED | OUTPATIENT
Start: 2023-07-20 | End: 2023-07-21

## 2023-07-20 RX ORDER — DIPHENHYDRAMINE HCL 50 MG
1 CAPSULE ORAL
Qty: 14 | Refills: 0
Start: 2023-07-20 | End: 2023-08-02

## 2023-07-20 RX ORDER — MIRTAZAPINE 45 MG/1
1 TABLET, ORALLY DISINTEGRATING ORAL
Qty: 14 | Refills: 0
Start: 2023-07-20 | End: 2023-08-02

## 2023-07-20 RX ORDER — RISPERIDONE 4 MG/1
1 TABLET ORAL
Qty: 28 | Refills: 0
Start: 2023-07-20 | End: 2023-08-02

## 2023-07-20 RX ORDER — ATORVASTATIN CALCIUM 80 MG/1
1 TABLET, FILM COATED ORAL
Qty: 14 | Refills: 0 | DISCHARGE
Start: 2023-07-20 | End: 2023-08-02

## 2023-07-20 RX ORDER — LEVOTHYROXINE SODIUM 300 MCG
1.5 TABLET ORAL
Qty: 14 | Refills: 0 | DISCHARGE
Start: 2023-07-20 | End: 2023-08-02

## 2023-07-20 RX ADMIN — GABAPENTIN 400 MILLIGRAM(S): 400 CAPSULE ORAL at 19:34

## 2023-07-20 RX ADMIN — GABAPENTIN 400 MILLIGRAM(S): 400 CAPSULE ORAL at 08:39

## 2023-07-20 RX ADMIN — ATORVASTATIN CALCIUM 10 MILLIGRAM(S): 80 TABLET, FILM COATED ORAL at 19:35

## 2023-07-20 RX ADMIN — METFORMIN HYDROCHLORIDE 1000 MILLIGRAM(S): 850 TABLET ORAL at 08:39

## 2023-07-20 RX ADMIN — Medication 400 MILLIGRAM(S): at 18:52

## 2023-07-20 RX ADMIN — TAMSULOSIN HYDROCHLORIDE 0.4 MILLIGRAM(S): 0.4 CAPSULE ORAL at 19:35

## 2023-07-20 RX ADMIN — LORATADINE 10 MILLIGRAM(S): 10 TABLET ORAL at 08:39

## 2023-07-20 RX ADMIN — Medication 1 MILLIGRAM(S): at 18:32

## 2023-07-20 RX ADMIN — METFORMIN HYDROCHLORIDE 1000 MILLIGRAM(S): 850 TABLET ORAL at 17:03

## 2023-07-20 RX ADMIN — Medication 1: at 17:04

## 2023-07-20 RX ADMIN — DIVALPROEX SODIUM 1000 MILLIGRAM(S): 500 TABLET, DELAYED RELEASE ORAL at 19:34

## 2023-07-20 RX ADMIN — Medication 0.5 MILLIGRAM(S): at 08:39

## 2023-07-20 RX ADMIN — GABAPENTIN 400 MILLIGRAM(S): 400 CAPSULE ORAL at 12:49

## 2023-07-20 RX ADMIN — MIRTAZAPINE 30 MILLIGRAM(S): 45 TABLET, ORALLY DISINTEGRATING ORAL at 19:35

## 2023-07-20 RX ADMIN — GUANFACINE 2 MILLIGRAM(S): 3 TABLET, EXTENDED RELEASE ORAL at 19:33

## 2023-07-20 RX ADMIN — GUANFACINE 1 MILLIGRAM(S): 3 TABLET, EXTENDED RELEASE ORAL at 08:39

## 2023-07-20 RX ADMIN — RISPERIDONE 3 MILLIGRAM(S): 4 TABLET ORAL at 08:39

## 2023-07-20 RX ADMIN — Medication 50 MILLIGRAM(S): at 18:32

## 2023-07-20 RX ADMIN — PANTOPRAZOLE SODIUM 40 MILLIGRAM(S): 20 TABLET, DELAYED RELEASE ORAL at 08:39

## 2023-07-20 RX ADMIN — SENNA PLUS 2 TABLET(S): 8.6 TABLET ORAL at 19:35

## 2023-07-20 RX ADMIN — Medication 50 MILLIGRAM(S): at 19:35

## 2023-07-20 RX ADMIN — RISPERIDONE 3 MILLIGRAM(S): 4 TABLET ORAL at 19:34

## 2023-07-20 RX ADMIN — Medication 50 MILLIGRAM(S): at 19:34

## 2023-07-20 RX ADMIN — DIVALPROEX SODIUM 500 MILLIGRAM(S): 500 TABLET, DELAYED RELEASE ORAL at 08:39

## 2023-07-20 NOTE — BH INPATIENT PSYCHIATRY PROGRESS NOTE - NSBHMETABOLIC_PSY_ALL_CORE_FT
BMI: BMI (kg/m2): 40.3 (07-19-23 @ 11:46)  HbA1c: A1C with Estimated Average Glucose Result: 6.7 % (06-24-23 @ 06:10)    Glucose: POCT Blood Glucose.: 114 mg/dL (07-20-23 @ 11:30)    BP: 124/70 (07-19-23 @ 08:40) (124/70 - 124/70)  Lipid Panel: Date/Time: 06-24-23 @ 06:10  Cholesterol, Serum: 118  Direct LDL: --  HDL Cholesterol, Serum: 25  Total Cholesterol/HDL Ration Measurement: --  Triglycerides, Serum: 155

## 2023-07-20 NOTE — BH INPATIENT PSYCHIATRY PROGRESS NOTE - NSBHCHARTREVIEWVS_PSY_A_CORE FT
Vital Signs Last 24 Hrs  T(C): 36.2 (07-20-23 @ 08:12), Max: 36.7 (07-19-23 @ 22:35)  T(F): 97.1 (07-20-23 @ 08:12), Max: 98 (07-19-23 @ 22:35)  HR: 72 (07-19-23 @ 17:45) (65 - 72)  BP: 132/86 (07-19-23 @ 17:45) (117/80 - 132/86)  BP(mean): --  RR: 18 (07-20-23 @ 13:02) (16 - 20)  SpO2: 99% (07-19-23 @ 17:45) (99% - 100%)    Orthostatic VS  07-20-23 @ 08:12  Lying BP: --/-- HR: --  Sitting BP: 102/74 HR: 96  Standing BP: --/-- HR: --  Site: --  Mode: --  Orthostatic VS  07-19-23 @ 22:35  Lying BP: --/-- HR: --  Sitting BP: 114/70 HR: 98  Standing BP: 126/81 HR: 97  Site: --  Mode: --

## 2023-07-20 NOTE — BH DISCHARGE NOTE NURSING/SOCIAL WORK/PSYCH REHAB - PATIENT PORTAL LINK FT
You can access the FollowMyHealth Patient Portal offered by [facility] by registering at the following website: [email or web address]. By joining [facility]’s FollowMyHealth portal, you will also be able to view your health information using other applications (apps) compatible with our system.

## 2023-07-20 NOTE — BH INPATIENT PSYCHIATRY PROGRESS NOTE - NSBHMSEBEHAV_PSY_A_CORE
Cooperative/Other
Cooperative/Other
Cooperative
Cooperative/Other
Cooperative
Cooperative/Other
Cooperative/Other

## 2023-07-20 NOTE — BH INPATIENT PSYCHIATRY PROGRESS NOTE - NSDCCRITERIA_PSY_ALL_CORE
symptom improvement

## 2023-07-20 NOTE — BH INPATIENT PSYCHIATRY PROGRESS NOTE - NSTXDCOTHRGOAL_PSY_ALL_CORE
Pt will show a reduction of disorganization and engage meaningfully with writer to identify a safe discharge plan. Pt will comply with recommended tx plan and medications for 5 days, identify 2 benefits for adhering to tx.

## 2023-07-20 NOTE — BH CHART NOTE - NSEVENTNOTEFT_PSY_ALL_CORE
Interval History:  CAROL called to evaluate for self-injurious statements. Per staff, patient making statements regarding wanting CO again and having urge to hurt self with vague references to objects in his room/bathroom; patient did harm himself. Patient denies making statements regarding urge to harm himself or others. Patient being provided PRNs ativan 1mg and benadryl 50mg as CAROL arrived to unit. Patient says he was recently on CO earlier today, and does not want to return to CO as plans are for him to be discharged tomorrow. Patient denies SI, denies current urges to self-injure, denies HI or aggressive thoughts. Patient requests additional PO PRNs, but is in agreement to wait for further medications as he recently received PRNs; will notify staff if he continues to feel need for additional PRNs. Patient agrees to notify staff if ideation occurs again.    T(C): 36.2 (07-20-23 @ 08:12), Max: 36.7 (07-19-23 @ 22:35)  HR: --  BP: --  RR: 18 (07-20-23 @ 13:02) (16 - 18)  SpO2: --    Physical Exam:  Gen: Patient sitting calmly in chair, NAD    Skin: No warmth, regional adenopathy, sign of infection, pus, scabbing, papules, vesicles, scaling, bulla, fissure, drainage, or other skin disruption noted.     HEENT: NC. EOMI, full ROM.    CV: Radial pulses 2+ b/l, RRR, no murmurs.     Ext: ROM intact, no clubbing/cyanosis/edema    Neuro: awake, alert, grossly oriented. CN II-XII grossly intact.    Assessment:  CAROL called to evaluate self-injurious behavior. Physical exam unremarkable. Patient denies NSSIB/SI/HI. Patient able to contract for safety. Provided with PO PRNs.    Plan:  1. No indication for 1:1 CO at this time given no recent NSSIB and patient denying urges to harm self or others. Able to contract for safety.  2. d/w RN staff who agree with plan.

## 2023-07-20 NOTE — BH INPATIENT PSYCHIATRY PROGRESS NOTE - NSBHFUPINTERVALHXFT_PSY_A_CORE
Chart reviewed and case discussed with treatment team. No events reported overnight. Sleep and appetite is fair. Patient is out on the unit and denies any SI/HI. He is focused on discharge. Patient is compliant with medications, no adverse effects reported.  
Patient is followed up for mood and SIB. Chart, medications and labs reviewed. Patient is discussed during morning brief, no events reported overnight, in good behavioral control. He remains on CO for self-injurious behavior.  Patient was seen and is evaluated on the unit, CO staff nearby. He reports having good sleep and appetite yesterday. He states that his mood is "awesome" and denies having any thoughts or urges to harm himself. Reports that he has difficulty controlling his emotions at times, which can lead to SIB/aggression, although does not feel this way now. He tells writer that he has been picking at several wounds on his arms due to itch and is worried about developing infection, order placed for Bacitracin ointment. Patient denies any AVH or delusional thoughts. Denies SIB/SI plan or intent. He denies depressive symptoms or anxiety. No acute medical concerns, VSS. Continue to monitor and provide therapeutic support.
Patient is followed up for mood and SIB. Chart, medications and labs reviewed. Patient is discussed during morning brief, no events reported overnight, in good behavioral control. He is on CO for self-injurious behavior.  Patient was seen and is evaluated at bedside, CO staff nearby. He is observed to be awake, lying down in bed. States that he feel tired and is resting after having lunch. He reports that he became upset over the weekend due to a female peer that disturbed him, verbalizes having his room changed. Patient denies any AVH or delusional thoughts. He denies SIB/SI plan or intent. Denies depressive symptoms or anxiety. No acute medical concerns, VSS. Continue to monitor and provide therapeutic support.
Patient is followed up for mood and SIB. Chart, medications and labs reviewed. Patient is discussed during morning brief, no events reported overnight, in good behavioral control. He is on CO for self-injurious behavior.  Patient was seen and is evaluated on the unit, CO staff nearby. He presents with bright affect and engages appropriately in interview. States that he would like to leave the hospital before the end of the week as he is feeling better. States that his mother would be able to come see him from [place] after he is discharged. Patient denies any AVH or delusional thoughts. He denies SIB/SI plan or intent. Denies depressive symptoms or anxiety. No acute medical concerns, VSS. Continue to monitor and provide therapeutic support.
Patient is followed up for mood and SIB. Chart, medications and labs reviewed. Patient is discussed during morning brief, overnight reported testicular pain, dysuria and blood in urine; he was assessed by CAROL and UA is unremarkable. He is on CO for self-injurious behavior.  Patient was seen and is evaluated on the unit, CO staff nearby. He states that he is having worsening testicular pain, requested PRN Motrin 400mg x2 and reports no improvement in pain. He states that he has difficulty sitting or lying down. Case discussed with hospitalist, advised for patient to be sent to the ED for urology workup & r/o of torsion. Patient sent out to the ED at 11:28AM. 
Patient is followed up for mood and SIB. Chart, medications and labs reviewed. 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 Patient to follow up with outpatient urology. He is on CO for self-injurious behavior.  Patient was seen and is evaluated on the unit, CO staff nearby. He denies testicular pain this morning. He reports having good sleep and appetite. Treatment team able to hold meeting with patient's outpatient team, housing, and mother. Discussed factors contributing to this hospitalization, he verbalized feeling frustrated after staff was not able to take him to the mall, reacting impulsively by breaking his glasses and stabbing himself with a screw. He states that he has been working on coping skills such as deep breathing, would like to go for walks when feeling anxious/upset at the group home. He expresses goal of being committed to his program and saving up money to buy himself a PS5. Patient has been compliant with medications on the unit. He denies SIB/SI plan or intent. He reports hearing voices, which worsen in the context of increased anxiety. Patient has been attending groups and is visible on the unit. Continue to monitor and provider therapeutic support. 
Chart reviewed and case discussed with treatment team. No events reported overnight. Sleep and appetite is fair. Patient reportedly cut himself yesterday due to increased anxiety. Denies any current urges to self harm but endorses passive SI. CO for safety. Patient is compliant with medications, no adverse effects reported.

## 2023-07-20 NOTE — BH INPATIENT PSYCHIATRY PROGRESS NOTE - NSBHMSESPEECH_PSY_A_CORE
Abnormal as indicated, otherwise normal...
Normal volume, rate, productivity, spontaneity and articulation
Normal volume, rate, productivity, spontaneity and articulation
Abnormal as indicated, otherwise normal...

## 2023-07-20 NOTE — BH INPATIENT PSYCHIATRY PROGRESS NOTE - ATTENDING COMMENTS
Discussed in team meeting

## 2023-07-20 NOTE — BH INPATIENT PSYCHIATRY PROGRESS NOTE - NSTXPROBCONDUC_PSY_ALL_CORE
CONDUCT PROBLEM

## 2023-07-20 NOTE — BH DISCHARGE NOTE NURSING/SOCIAL WORK/PSYCH REHAB - DISCHARGE INSTRUCTIONS AFTERCARE APPOINTMENTS
In order to check the location, date, or time of your aftercare appointment, please refer to your Discharge Instructions Document given to you upon leaving the hospital.  If you have lost the instructions please call [phone]

## 2023-07-20 NOTE — BH INPATIENT PSYCHIATRY PROGRESS NOTE - LEVEL OF CONSCIOUSNESS
Lethargic, arousable to verbal stimulus
Lethargic, arousable to verbal stimulus
Alert
Lethargic, arousable to verbal stimulus
Alert
Lethargic, arousable to verbal stimulus
Lethargic, arousable to verbal stimulus

## 2023-07-20 NOTE — BH DISCHARGE NOTE NURSING/SOCIAL WORK/PSYCH REHAB - NSDCADDINFO1FT_PSY_ALL_CORE
You and your  will work together today to call and set up your aftercare appointments as discussed in discharge planning meeting.

## 2023-07-20 NOTE — BH INPATIENT PSYCHIATRY PROGRESS NOTE - NSBHATTESTBILLING_PSY_A_CORE
50159-Vyhiozfena OBS or IP - moderate complexity OR 35-49 mins
22896-Mamsbnckss OBS or IP - moderate complexity OR 35-49 mins
37555-Mbgxjegosm OBS or IP - low complexity OR 25-34 mins
02395-Bfwaiaeuqf OBS or IP - low complexity OR 25-34 mins
39597-Kkydhqlfew OBS or IP - moderate complexity OR 35-49 mins
80007-Akugaaihec OBS or IP - moderate complexity OR 35-49 mins
15620-Wqhcpdeocb OBS or IP - moderate complexity OR 35-49 mins

## 2023-07-20 NOTE — BH INPATIENT PSYCHIATRY PROGRESS NOTE - NSICDXBHPRIMARYDX_PSY_ALL_CORE
Autism spectrum disorder   F84.0  

## 2023-07-20 NOTE — BH INPATIENT PSYCHIATRY PROGRESS NOTE - NSICDXBHSECONDARYDX_PSY_ALL_CORE
Post traumatic stress disorder (PTSD)   F43.10  History of impulse control disorder   Z86.59  

## 2023-07-20 NOTE — BH DISCHARGE NOTE NURSING/SOCIAL WORK/PSYCH REHAB - NSDCPRGOAL_PSY_ALL_CORE
Writer met with patient in order to discuss progress towards goal upon discharge. Pt has completed current goal, as patient has identified his mother, friends, and sister as reasons to keep living. Writer provided support.    On the unit, patient was visible, interacting with selected peers. Patient was initially on CO 1:1, however upon discharge is no longer under constant observation. During session, patient stated he is looking forward to discharge and plans on following up with treatment. Writer provided support. Patient and writer completed safety planning form. Patient denies SI and is currently able to contract for safety.     Patient attended approximately one psychiatric rehabilitation group during current hospitalization despite daily prompting and encouragement from staff. Patient was unable to tolerate session structure.

## 2023-07-20 NOTE — BH INPATIENT PSYCHIATRY PROGRESS NOTE - NSBHASSESSSUMMFT_PSY_ALL_CORE
Patient is a 33 year old  male, single, domiciled at [facility]. Patient has PPH of moderate ID, ASD, impulse control disorder, factitious disorder, HAVEN, mood disorders, PTSD and ADHD. Patient has extensive history of inpatient hospitalization and ED visits for both medical/psych complaints, most recently was discharged from [facility] on 6/6/23, longstanding h/o SIB (head banging, cutting), but no known SA, longstanding h/o endorsing SI, h/o aggression toward staff at , PMHx significant for asthma, DM, urinary retention, GERD, BPH, hypothyroidism, HLD, HTN, and b/l myopia. Patient was admitted to [facility] ML4 for self inflicted laceration to abdomen, endorsing CAH to hurt himself and staff at group home. He was transferred to ED for sinus tachy and tachypnea and eventually admitted for unresolved PNA. On [facility] admission 6/23-6/28 for PNA treated with Ceftriaxone; no WBC elevation on readmit, RVP neg - CTPA: no PE, Tree-in-bud and nodular infiltrates scattered throughout the right upper and lower lobes, blood cultures NG x 5 days, s/p Zosyn 5 days. Patient medically cleared and now transferred back to [facility].    Working Diagnoses:  ASD  Impulse control disorder  PTSD    On assessment, patient reports feeling "good" and denies any thoughts to self harm.    Plan:  >Legal: 9.27  >Obs: Routine observation, denies SIB/SI plan or intent    >Psychiatric Meds:  Continue Risperidone 3mg AM, 4mg HS for psychosis/mood  Continue Depakote DR 500mg AM, 1000mg HS for mood  Continue Guanfacine 1mg, HS for impulsivity/aggression  Continue Gabapentin 400mg, TID for anxiety  Continue Mirtazapine 30mg, HS for depression  Continue Clonazepam 0.5mg, BID for anxiety    PRN medications:  Thorazine 50 mg PO/IM, Q6H PRN for psychotic agitation  Lorazepam 2mg IM, PRN for severe agitation  Benadryl 50mg PO/IM, Q6H PRN for EPS ppx.    >Labs: Admission labs reviewed, no acute findings. Hold antipsychotics if QTc >500  >Medical: No acute concerns. No indication for CIWA. Patient with stable VS, no visible physical symptoms of withdrawal. During the course of treatment, will collaborate with medical team to manage medical issues.  >Diet: Soft-Bite Sized  >Social: Milieu/structured therapy  >Treatment Interventions: Groups and Individual Therapy/CBT.  >Dispo: Collateral and dispo planning pending further symptom and medication optimization
Patient is a 33 year old  male, single, [facility] Patient has PPH of moderate ID, ASD, impulse control disorder, factitious disorder, HAVEN, mood disorders, PTSD and ADHD. Patient has extensive history of inpatient hospitalization and ED visits for both medical/psych complaints, most recently was discharged from [facility] on 6/6/23, longstanding h/o SIB (head banging, cutting), but no known SA, longstanding h/o endorsing SI, h/o aggression toward staff at , PMHx significant for asthma, DM, urinary retention, GERD, BPH, hypothyroidism, HLD, HTN, and b/l myopia. Patient was admitted to [facility]4 for self inflicted laceration to abdomen, endorsing CAH to hurt himself and staff at group home. He was transferred to ED for sinus tachy and tachypnea and eventually admitted for unresolved PNA. On [facility] admission 6/23-6/28 for PNA treated with Ceftriaxone; no WBC elevation on readmit, RVP neg - CTPA: no PE, Tree-in-bud and nodular infiltrates scattered throughout the right upper and lower lobes, blood cultures NG x 5 days, s/p Zosyn 5 days. Patient medically cleared and now transferred back to [facility].    7/14: On assessment, patient reports improved mood and denies any urges of wanting to harm himself. He has not exhibited any SIB for past 1 week (including medical hospitalization at [facility]). He denies SIIP.     Working Diagnoses:  ASD  Impulse control disorder  PTSD      Plan:  >Legal: 9.27  >Obs: Routine observation, denies SIB/SI plan or intent    >Psychiatric Meds:  Continue Risperidone 3mg AM, 4mg HS for psychosis/mood  Continue Depakote DR 500mg AM, 1000mg HS for mood  Continue Guanfacine 1mg, HS for impulsivity/aggression  Continue Gabapentin 400mg, TID for anxiety  Continue Mirtazapine 30mg, HS for depression  Continue Clonazepam 0.5mg, BID for anxiety    PRN medications:  Thorazine 50 mg PO/IM, Q6H PRN for psychotic agitation  Lorazepam 2mg IM, PRN for severe agitation  Benadryl 50mg PO/IM, Q6H PRN for EPS ppx.    >Labs: Admission labs reviewed, no acute findings. Hold antipsychotics if QTc >500  >Medical: No acute concerns. No indication for CIWA. Patient with stable VS, no visible physical symptoms of withdrawal. During the course of treatment, will collaborate with medical team to manage medical issues.  >Diet: Soft-Bite Sized  >Social: Milieu/structured therapy  >Treatment Interventions: Groups and Individual Therapy/CBT.  >Dispo: Collateral and dispo planning pending further symptom and medication optimization
Patient is a 33 year old  male, single, domiciled at [facility]. Patient has PPH of moderate ID, ASD, impulse control disorder, factitious disorder, HAVEN, mood disorders, PTSD and ADHD. Patient has extensive history of inpatient hospitalization and ED visits for both medical/psych complaints, most recently was discharged from [facility] on 6/6/23, longstanding h/o SIB (head banging, cutting), but no known SA, longstanding h/o endorsing SI, h/o aggression toward staff at , PMHx significant for asthma, DM, urinary retention, GERD, BPH, hypothyroidism, HLD, HTN, and b/l myopia. Patient was admitted to [facility] ML4 for self inflicted laceration to abdomen, endorsing CAH to hurt himself and staff at group home. He was transferred to ED for sinus tachy and tachypnea and eventually admitted for unresolved PNA. On [facility] admission 6/23-6/28 for PNA treated with Ceftriaxone; no WBC elevation on readmit, RVP neg - CTPA: no PE, Tree-in-bud and nodular infiltrates scattered throughout the right upper and lower lobes, blood cultures NG x 5 days, s/p Zosyn 5 days. Patient medically cleared and now transferred back to [facility].    Working Diagnoses:  ASD  Impulse control disorder  PTSD    On assessment, patient endorses passive SI and is on CO for recent NSSIB.    Plan:  >Legal: 9.27  >Obs: CO    >Psychiatric Meds:  Continue Risperidone 3mg AM, 4mg HS for psychosis/mood  Continue Depakote DR 500mg AM, 1000mg HS for mood  Continue Guanfacine 1mg, HS for impulsivity/aggression  Continue Gabapentin 400mg, TID for anxiety  Continue Mirtazapine 30mg, HS for depression  Continue Clonazepam 0.5mg, BID for anxiety    PRN medications:  Thorazine 50 mg PO/IM, Q6H PRN for psychotic agitation  Lorazepam 2mg IM, PRN for severe agitation  Benadryl 50mg PO/IM, Q6H PRN for EPS ppx.    >Labs: Admission labs reviewed, no acute findings. Hold antipsychotics if QTc >500  >Medical: No acute concerns. No indication for CIWA. Patient with stable VS, no visible physical symptoms of withdrawal. During the course of treatment, will collaborate with medical team to manage medical issues.  >Diet: Soft-Bite Sized  >Social: Milieu/structured therapy  >Treatment Interventions: Groups and Individual Therapy/CBT.  >Dispo: Collateral and dispo planning pending further symptom and medication optimization
Patient is a 33 year old  male, single, domiciled at [facility]. Patient has PPH of moderate ID, ASD, impulse control disorder, factitious disorder, HAVEN, mood disorders, PTSD and ADHD. Patient has extensive history of inpatient hospitalization and ED visits for both medical/psych complaints, most recently was discharged from [facility] on 6/6/23, longstanding h/o SIB (head banging, cutting), but no known SA, longstanding h/o endorsing SI, h/o aggression toward staff at , PMHx significant for asthma, DM, urinary retention, GERD, BPH, hypothyroidism, HLD, HTN, and b/l myopia. Patient was admitted to [facility]4 for self inflicted laceration to abdomen, endorsing CAH to hurt himself and staff at group home. He was transferred to ED for sinus tachy and tachypnea and eventually admitted for unresolved PNA. On [facility] admission 6/23-6/28 for PNA treated with Ceftriaxone; no WBC elevation on readmit, RVP neg - CTPA: no PE, Tree-in-bud and nodular infiltrates scattered throughout the right upper and lower lobes, blood cultures NG x 5 days, s/p Zosyn 5 days. Patient medically cleared and now transferred back to [facility].    On assessment, patient reports improved mood and denies any urges of wanting to harm himself. He states that he would like to be discharged so that his mother would be able to visit him at the group home. He is continued on CO for SIB, currently denies SIB/SI plan or intent, although has recent hx of impulsive self-injurious behavior.    Working Diagnoses:  ASD  Impulse control disorder  PTSD      Plan:  >Legal: 9.27  >Obs: Constant observation, denies SIB, although remains impulsive and exhibits attention-seeking behaviors    >Psychiatric Meds:  Decrease Risperidone 3mg AM, BID for psychosis/mood  Continue Depakote DR 500mg AM, 1000mg HS for mood  Increase Guanfacine 1mg, BID for impulsivity/aggression  Continue Gabapentin 400mg, TID for anxiety  Continue Mirtazapine 30mg, HS for depression  Continue Clonazepam 0.5mg, BID for anxiety    PRN medications:  Thorazine 50 mg PO/IM, Q6H PRN for psychotic agitation  Lorazepam 2mg IM, PRN for severe agitation  Benadryl 50mg PO/IM, Q6H PRN for EPS ppx.    >Labs: Admission labs reviewed, no acute findings. Hold antipsychotics if QTc >500  >Medical: No acute concerns. No indication for CIWA. Patient with stable VS, no visible physical symptoms of withdrawal. During the course of treatment, will collaborate with medical team to manage medical issues.  >Diet: Soft-Bite Sized  >Social: Milieu/structured therapy  >Treatment Interventions: Groups and Individual Therapy/CBT.  >Dispo: Collateral and dispo planning pending further symptom and medication optimization
Patient is a 33 year old  male, single, domiciled at [facility]. Patient has PPH of moderate ID, ASD, impulse control disorder, factitious disorder, HAVEN, mood disorders, PTSD and ADHD. Patient has extensive history of inpatient hospitalization and ED visits for both medical/psych complaints, most recently was discharged from [facility] on 6/6/23, longstanding h/o SIB (head banging, cutting), but no known SA, longstanding h/o endorsing SI, h/o aggression toward staff at , PMHx significant for asthma, DM, urinary retention, GERD, BPH, hypothyroidism, HLD, HTN, and b/l myopia. Patient was admitted to [facility] ML4 for self inflicted laceration to abdomen, endorsing CAH to hurt himself and staff at group home. He was transferred to ED for sinus tachy and tachypnea and eventually admitted for unresolved PNA. On [facility] admission 6/23-6/28 for PNA treated with Ceftriaxone; no WBC elevation on readmit, RVP neg - CTPA: no PE, Tree-in-bud and nodular infiltrates scattered throughout the right upper and lower lobes, blood cultures NG x 5 days, s/p Zosyn 5 days. Patient medically cleared and now transferred back to [facility].        Working Diagnoses:  ASD  Impulse control disorder  PTSD      Plan:  >Legal: 9.27  >Obs: Constant observation, denies SIB, although remains impulsive and exhibits attention-seeking behaviors    >Psychiatric Meds:  Continue Risperidone 3mg AM, BID for psychosis/mood  Continue Depakote DR 500mg AM, 1000mg HS for mood  Continue Guanfacine 1mg, BID for impulsivity/aggression  Continue Gabapentin 400mg, TID for anxiety  Continue Mirtazapine 30mg, HS for depression  Continue Clonazepam 0.5mg, BID for anxiety    PRN medications:  Thorazine 50 mg PO/IM, Q6H PRN for psychotic agitation  Lorazepam 2mg IM, PRN for severe agitation  Benadryl 50mg PO/IM, Q6H PRN for EPS ppx.    >Labs: Admission labs reviewed, no acute findings. Hold antipsychotics if QTc >500  >Medical: Transferred to ED for dysuria and testicular pain.  >Diet: Soft-Bite Sized  >Social: Milieu/structured therapy  >Treatment Interventions: Groups and Individual Therapy/CBT.  >Dispo: Collateral and dispo planning pending further symptom and medication optimization
Patient is a 33 year old  male, single, domiciled at [facility]. Patient has PPH of moderate ID, ASD, impulse control disorder, factitious disorder, HAVEN, mood disorders, PTSD and ADHD. Patient has extensive history of inpatient hospitalization and ED visits for both medical/psych complaints, most recently was discharged from [facility] on 6/6/23, longstanding h/o SIB (head banging, cutting), but no known SA, longstanding h/o endorsing SI, h/o aggression toward staff at , PMHx significant for asthma, DM, urinary retention, GERD, BPH, hypothyroidism, HLD, HTN, and b/l myopia. Patient was admitted to [facility] ML4 for self inflicted laceration to abdomen, endorsing CAH to hurt himself and staff at group home. He was transferred to ED for sinus tachy and tachypnea and eventually admitted for unresolved PNA. On [facility] admission 6/23-6/28 for PNA treated with Ceftriaxone; no WBC elevation on readmit, RVP neg - CTPA: no PE, Tree-in-bud and nodular infiltrates scattered throughout the right upper and lower lobes, blood cultures NG x 5 days, s/p Zosyn 5 days. Patient medically cleared and now transferred back to [facility].        Working Diagnoses:  ASD  Impulse control disorder  PTSD      Plan:  >Legal: 9.27  >Obs: Routine observation, denies SIB/SI plan or intent    >Psychiatric Meds:  Continue Risperidone 3mg AM, BID for psychosis/mood  Continue Depakote DR 500mg AM, 1000mg HS for mood  Continue Guanfacine 1mg, BID for impulsivity/aggression  Continue Gabapentin 400mg, TID for anxiety  Continue Mirtazapine 30mg, HS for depression      PRN medications:  Thorazine 50 mg PO/IM, Q6H PRN for psychotic agitation  Lorazepam 2mg IM, PRN for severe agitation  Benadryl 50mg PO/IM, Q6H PRN for EPS ppx.    >Labs: Admission labs reviewed, no acute findings. 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 Patient to follow up with outpatient urology.  >Diet: Soft-Bite Sized  >Social: Milieu/structured therapy  >Treatment Interventions: Groups and Individual Therapy/CBT.  >Dispo: Collateral and dispo planning pending further symptom and medication optimization
Patient is a 33 year old  male, single, domiciled at [facility]. Patient has PPH of moderate ID, ASD, impulse control disorder, factitious disorder, HAVEN, mood disorders, PTSD and ADHD. Patient has extensive history of inpatient hospitalization and ED visits for both medical/psych complaints, most recently was discharged from [facility] on 6/6/23, longstanding h/o SIB (head banging, cutting), but no known SA, longstanding h/o endorsing SI, h/o aggression toward staff at , PMHx significant for asthma, DM, urinary retention, GERD, BPH, hypothyroidism, HLD, HTN, and b/l myopia. Patient was admitted to [facility] ML4 for self inflicted laceration to abdomen, endorsing CAH to hurt himself and staff at group home. He was transferred to ED for sinus tachy and tachypnea and eventually admitted for unresolved PNA. On [facility] admission 6/23-6/28 for PNA treated with Ceftriaxone; no WBC elevation on readmit, RVP neg - CTPA: no PE, Tree-in-bud and nodular infiltrates scattered throughout the right upper and lower lobes, blood cultures NG x 5 days, s/p Zosyn 5 days. Patient medically cleared and now transferred back to [facility].    On assessment, patient reports improved mood and denies any urges of wanting to harm himself. He reports getting into a verbal argument with a female peer on the unit over the weekend. He was placed on CO for SIB, currently denies SIB/SI plan or intent.    Working Diagnoses:  ASD  Impulse control disorder  PTSD      Plan:  >Legal: 9.27  >Obs: Constant observation, denies SIB, although remains impulsive and exhibits attention-seeking behaviors    >Psychiatric Meds:  Continue Risperidone 3mg AM, 4mg HS for psychosis/mood  Continue Depakote DR 500mg AM, 1000mg HS for mood  Continue Guanfacine 1mg, HS for impulsivity/aggression  Continue Gabapentin 400mg, TID for anxiety  Continue Mirtazapine 30mg, HS for depression  Continue Clonazepam 0.5mg, BID for anxiety    PRN medications:  Thorazine 50 mg PO/IM, Q6H PRN for psychotic agitation  Lorazepam 2mg IM, PRN for severe agitation  Benadryl 50mg PO/IM, Q6H PRN for EPS ppx.    >Labs: Admission labs reviewed, no acute findings. Hold antipsychotics if QTc >500  >Medical: No acute concerns. No indication for CIWA. Patient with stable VS, no visible physical symptoms of withdrawal. During the course of treatment, will collaborate with medical team to manage medical issues.  >Diet: Soft-Bite Sized  >Social: Milieu/structured therapy  >Treatment Interventions: Groups and Individual Therapy/CBT.  >Dispo: Collateral and dispo planning pending further symptom and medication optimization

## 2023-07-20 NOTE — BH INPATIENT PSYCHIATRY PROGRESS NOTE - NSBHATTESTTYPEVISIT_PSY_A_CORE
MASHA without on-site Attending supervision
On-site Attending supervising MASHA (99XXX codes)
MASHA without on-site Attending supervision
On-site Attending supervising MASHA (99XXX codes)
On-site Attending supervising MASHA (99XXX codes)

## 2023-07-20 NOTE — BH INPATIENT PSYCHIATRY PROGRESS NOTE - NSBHFUPINTERVALCCFT_PSY_A_CORE
follow up for mood/SIB
follow up for mood/SIB
Patient seen for follow up for mood/SIB.
Patient seen for follow up for mood/SIB.
follow up for mood/SIB

## 2023-07-20 NOTE — BH INPATIENT PSYCHIATRY PROGRESS NOTE - NSTXCONDUCGOAL_PSY_ALL_CORE
Will develop more adaptive coping strategies to manage stress

## 2023-07-20 NOTE — BH INPATIENT PSYCHIATRY PROGRESS NOTE - CURRENT MEDICATION
MEDICATIONS  (STANDING):  atorvastatin 10 milliGRAM(s) Oral at bedtime  divalproex  milliGRAM(s) Oral daily  divalproex DR 1000 milliGRAM(s) Oral at bedtime  gabapentin 400 milliGRAM(s) Oral three times a day  guanFACINE. 1 milliGRAM(s) Oral <User Schedule>  insulin lispro (ADMELOG) corrective regimen sliding scale   SubCutaneous at bedtime  insulin lispro (ADMELOG) corrective regimen sliding scale   SubCutaneous three times a day before meals  levothyroxine 50 MICROGram(s) Oral daily  loratadine 10 milliGRAM(s) Oral daily  metFORMIN 1000 milliGRAM(s) Oral two times a day with meals  mirtazapine 30 milliGRAM(s) Oral at bedtime  pantoprazole    Tablet 40 milliGRAM(s) Oral before breakfast  risperiDONE   Tablet 3 milliGRAM(s) Oral two times a day  senna 2 Tablet(s) Oral at bedtime  tamsulosin 0.4 milliGRAM(s) Oral at bedtime    MEDICATIONS  (PRN):  bacitracin   Ointment 1 Application(s) Topical three times a day PRN wound  chlorproMAZINE    Injectable 50 milliGRAM(s) IntraMuscular once PRN severe agitation  chlorproMAZINE    Tablet 50 milliGRAM(s) Oral every 6 hours PRN agitation  diphenhydrAMINE 50 milliGRAM(s) Oral every 6 hours PRN EPS ppx.  diphenhydrAMINE Injectable 50 milliGRAM(s) IntraMuscular once PRN EPS ppx.  hydrOXYzine hydrochloride 50 milliGRAM(s) Oral every 6 hours PRN anxiety  ibuprofen  Tablet. 400 milliGRAM(s) Oral every 6 hours PRN Mild Pain (1 - 3), Moderate Pain (4 - 6)  LORazepam     Tablet 1 milliGRAM(s) Oral every 6 hours PRN anxiety

## 2023-07-20 NOTE — BH DISCHARGE NOTE NURSING/SOCIAL WORK/PSYCH REHAB - NSDCVIVACCINE_GEN_ALL_CORE_FT
Tdap; 20-Dec-2018 12:21; Lo Anaya (RN); [employer]; [id number] (Exp. Date: 02-Nov-2020); IntraMuscular; Deltoid Left.; 0.5 milliLiter(s); VIS (VIS Published: 09-May-2013, VIS Presented: 20-Dec-2018);   Tdap; 26-Mar-2019 18:59; Shavon Barrios (RN); [employer]; [id number] (Exp. Date: 26-Feb-2021); IntraMuscular; Deltoid Left.; 0.5 milliLiter(s); VIS (VIS Published: 09-May-2013, VIS Presented: 26-Mar-2019);   Tdap; 01-Dec-2021 09:35; Zamzam Coulter (RN); [employer]; [id number] (Exp. Date: 09-Sep-2023); IntraMuscular; Deltoid Left.; 0.5 milliLiter(s); VIS (VIS Published: 09-May-2013, VIS Presented: 01-Dec-2021);   Tdap; 21-Jul-2022 01:28; Rose Hancock (RN); [employer]; [id number] (Exp. Date: 14-Oct-2024); IntraMuscular; Deltoid Right.; 0.5 milliLiter(s); VIS (VIS Published: 09-May-2013, VIS Presented: 21-Jul-2022);   Tdap; 12-Jun-2023 21:01; Diann Paul (RN); [employer]; [id number] (Exp. Date: 08-Mar-2025); IntraMuscular; Deltoid Left.; 0.5 milliLiter(s); VIS (VIS Published: 09-May-2013, VIS Presented: 12-Jun-2023);

## 2023-07-20 NOTE — BH INPATIENT PSYCHIATRY PROGRESS NOTE - NSBHMSETHTCONTENT_PSY_A_CORE
Suicidality/Other
Suicidality/Other
Unremarkable
Unremarkable/Other
Suicidality/Other

## 2023-07-20 NOTE — BH INPATIENT PSYCHIATRY PROGRESS NOTE - PRN MEDS
MEDICATIONS  (PRN):  bacitracin   Ointment 1 Application(s) Topical three times a day PRN wound  chlorproMAZINE    Injectable 50 milliGRAM(s) IntraMuscular once PRN severe agitation  chlorproMAZINE    Tablet 50 milliGRAM(s) Oral every 6 hours PRN agitation  diphenhydrAMINE 50 milliGRAM(s) Oral every 6 hours PRN EPS ppx.  diphenhydrAMINE Injectable 50 milliGRAM(s) IntraMuscular once PRN EPS ppx.  hydrOXYzine hydrochloride 50 milliGRAM(s) Oral every 6 hours PRN anxiety  ibuprofen  Tablet. 400 milliGRAM(s) Oral every 6 hours PRN Mild Pain (1 - 3), Moderate Pain (4 - 6)  LORazepam     Tablet 1 milliGRAM(s) Oral every 6 hours PRN anxiety

## 2023-07-20 NOTE — BH INPATIENT PSYCHIATRY PROGRESS NOTE - NSBHMSEAFFQUAL_PSY_A_CORE
Euthymic
Euthymic
Irritable/Anxious

## 2023-07-21 VITALS — TEMPERATURE: 96 F

## 2023-07-21 LAB — GLUCOSE BLDC GLUCOMTR-MCNC: 192 MG/DL — HIGH (ref 70–99)

## 2023-07-21 RX ORDER — GUANFACINE 3 MG/1
2 TABLET, EXTENDED RELEASE ORAL
Qty: 28 | Refills: 0
Start: 2023-07-21 | End: 2023-08-03

## 2023-07-21 RX ADMIN — Medication 1: at 08:26

## 2023-07-21 RX ADMIN — DIVALPROEX SODIUM 500 MILLIGRAM(S): 500 TABLET, DELAYED RELEASE ORAL at 08:19

## 2023-07-21 RX ADMIN — GABAPENTIN 400 MILLIGRAM(S): 400 CAPSULE ORAL at 08:19

## 2023-07-21 RX ADMIN — Medication 50 MILLIGRAM(S): at 06:11

## 2023-07-21 RX ADMIN — Medication 50 MICROGRAM(S): at 06:11

## 2023-07-21 RX ADMIN — RISPERIDONE 3 MILLIGRAM(S): 4 TABLET ORAL at 08:19

## 2023-07-21 RX ADMIN — METFORMIN HYDROCHLORIDE 1000 MILLIGRAM(S): 850 TABLET ORAL at 08:21

## 2023-07-21 RX ADMIN — Medication 50 MILLIGRAM(S): at 06:10

## 2023-07-21 RX ADMIN — LORATADINE 10 MILLIGRAM(S): 10 TABLET ORAL at 08:19

## 2023-07-21 RX ADMIN — Medication 1 MILLIGRAM(S): at 06:11

## 2023-07-21 RX ADMIN — PANTOPRAZOLE SODIUM 40 MILLIGRAM(S): 20 TABLET, DELAYED RELEASE ORAL at 08:19

## 2023-07-21 NOTE — BH INPATIENT PSYCHIATRY DISCHARGE NOTE - NSBHDCMEDICALFT_PSY_A_CORE
right testis mass - does not require intervention at this point, patient to follow up with outpatient urology  DM - metformin  hypothyroid - synthroid

## 2023-07-21 NOTE — BH INPATIENT PSYCHIATRY DISCHARGE NOTE - HPI (INCLUDE ILLNESS QUALITY, SEVERITY, DURATION, TIMING, CONTEXT, MODIFYING FACTORS, ASSOCIATED SIGNS AND SYMPTOMS)
Patient is a 33 year old  male, single, domiciled at [facility]. Patient has PPH of moderate ID, ASD, impulse control disorder, factitious disorder, HAVEN, mood disorders, PTSD and ADHD. Patient has extensive history of inpatient hospitalization and ED visits for both medical/psych complaints, most recently was discharged from [facility] on 6/6/23, longstanding h/o SIB (head banging, cutting), but no known SA, longstanding h/o endorsing SI, h/o aggression toward staff at , PMHx significant for asthma, DM, urinary retention, GERD, BPH, hypothyroidism, HLD, HTN, and b/l myopia. Patient was admitted to [facility] for self inflicted laceration to abdomen, endorsing CAH to hurt himself and staff at group home. He was transferred to ED for sinus tachy and tachypnea and eventually admitted for unresolved PNA. On [facility] admission 6/23-6/28 for PNA treated with Ceftriaxone; no WBC elevation on readmit, RVP neg - CTPA: no PE, Tree-in-bud and nodular infiltrates scattered throughout the right upper and lower lobes, blood cultures NG x 5 days, s/p Zosyn 5 days. Patient medically cleared and now transferred back to [facility].      As per  psychiatry notes:  7/6: alert, angry, irritable, refused to engage with writer, Moises Maradiaga called last night d/t agitation-redirectable, no prn's, awaiting bed at [facility]  7/7: calm during interview, remains unpredictable as per staff. pending bed. Patient has been refusing am risperidone, accepting night doses x 3 days   7/10: used plastic knife on previous wound on 7/7 - now removing dressing by himself. no PRNs required and behavior has been in control as per staff. no SI or HI. pending bed at [facility]  7/11: No changes   7/12: Remains minimizing situation. Awaiting bed. Will not make changes today. Appreciate some rocking in the chair today- will monitor for akithisia.  7/13: transfer to [facility] - report given to NP Abhishek - patient expressed desire to return to [facility] due to passive thoughts of self harm - NP made aware patient has been on CO for impulsivity and has hx of harming self here in [facility] with plastic knife.       On unit:  Patient was seen and is evaluated in the interview room. He presents with bright affect and is able to engage appropriately with writer. He states that he had initially presented to the hospital after he has stabbed himself in the abdomen, proceeds to show writer this wound and verbalizes hurting himself due to CAH. He reports feeling better physically after being discharged from [facility], somewhat upset with returning to [facility] as patient was ordering food for himself using [employer] at [facility] and is no longer allowed to do so here. He does report trying to use a plastic knife to open his abdominal wound during hospitalization, verbalizes that he did this after he had an argument with his mother. When discussing this argument he states that his mother wanted him to return to [facility] for further treatment, while patient did not want to return. When asked why patient had started to refuse his AM dose of Risperidone, he states that he was upset at a nurse who had awoke him to take medications., was restarted on AM Risperidone and has been compliant. He describes his mood as "good" and smiles while saying this. He reports having good appetite, does express some difficulty with sleep initiation. He denies currently having any SIB/SI plan or intent, no prior hx of SA, does have hx of NSSIB. We discussed continuing patient on CO for self-injurious behavior due to significant hx of impulsivity, SIB, and attempts to reopen his abdominal wound during hospitalization at [facility]. He verbalizes that the feeling of wanting to harm himself is often impulsive and a response to changes in mood/stress. He denies any HIIP, does have hx of aggressive behavior and has previously been tazed by police due to severity of aggression. He states that he would like to stay in the hospital longer at he wants to be placed at another group home, verbalizes being on a waitlist for a group home in [place], [place]. Goes on to report that he does not feel like he is being treated appropriately at the group home and is ridiculed at times over his weight. Patient denies A/V hallucinations, delusional thoughts, magical thinking, thought broadcasting/insertion, ideas of reference. He denies symptoms suggestive of everett such as insomnia with increase in energy/goal directed activities, grandiosity, racing thoughts, impulsive or risk-taking behaviors, hypersexuality. He denies symptoms associated with depression such as dysphoric mood, disturbances in appetite, impaired concentration, anergia, anhedonia, feelings of hopelessness/worthlessness/helplessness, suicidal ideations, crying spells. Does report hx of PTSD, endorses occasional nightmares. He denies any illicit substance use, does report using a nicotine vape.

## 2023-07-21 NOTE — BH INPATIENT PSYCHIATRY DISCHARGE NOTE - HOSPITAL COURSE
Patient is a 33 year old  male, single, domiciled at [facility]. Patient has PPH of moderate ID, ASD, impulse control disorder, factitious disorder, HAVEN, mood disorders, PTSD and ADHD. Patient has extensive history of inpatient hospitalization and ED visits for both medical/psych complaints, most recently was discharged from [facility] on 6/6/23, longstanding h/o SIB (head banging, cutting), but no known SA, longstanding h/o endorsing SI, h/o aggression toward staff at , PMHx significant for asthma, DM, urinary retention, GERD, BPH, hypothyroidism, HLD, HTN, and b/l myopia. Patient was admitted to [facility] for self inflicted laceration to abdomen, endorsing CAH to hurt himself and staff at group home. He was transferred to ED for sinus tachy and tachypnea and eventually admitted for unresolved PNA. On [facility] admission 6/23-6/28 for PNA treated with Ceftriaxone; no WBC elevation on readmit, RVP neg - CTPA: no PE, Tree-in-bud and nodular infiltrates scattered throughout the right upper and lower lobes, blood cultures NG x 5 days, s/p Zosyn 5 days. Patient medically cleared and now transferred back to [facility]. On initial evaluation, patient presents with bright affect, states that he originally presented to the hospital as he was upset towards staff members at the group home. He verbalizes that he had wanted to go to the mall, however staff were not able to take him there. Goes on to express that this made him feel frustrated and he then started hearing voices to hurt himself/others. He states that he broke his eyeglasses and stabbed himself in the stomach with a screw. He reports that voices tell him to hurt himself at times in conjunction with his mood/anxiety. He states that he tried to stab himself with a plastic utensil at [facility], as he was upset at nurses approaching him for labs, vital signs, and medications while he was sleeping. He denies SIB/SI plan or intent on admission, although placed on CO for SIB due to hx of impulsive behavior and recent SIB. Patient was continued on med regimen from [facility], Risperidone 3mg BID, Guanfacine 1mg HS, Remeron 30mg HS, Depakote DR 500mg AM & 1000mg HS, and Gabapentin 400mg TID. He was started on Atarax q6h PRN for anxiety, dosage of Guanfacine titrated to 1mg BID and switched to Intuniv 2mg HS. Patient has been compliant with his medications during this hospitalization, has been tolerating well and denies SE. On 7/19, patient reported worsening testicular pain from overnight, was given oral analgesics but reported no improvement in pain, he was transferred to the ED for urology workup. 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 Patient is to follow up with outpatient urology clinic. While on the unit he has maintained good behavioral control, has good appetite and sleep. He has not engaged in any SIB for the past 2 weeks, although has been provacative at times while on the unit. Patient at this time no longer meets criteria for continued inpatient hospitalization and can safely be discharged to the community. On day of discharge, patient adamantly denies any SIB/SI/HI plan or intent. He denies AVH or delusional thoughts. Denies feeling depressed or anxious, states that he is working on being committed to his program and will work on saving money to buy a PS5. Upon discharge patient is provided with 2 weeks supply of medications and is given outpatient follow up. Patient was provided with extensive psychoeducation on treatment options and motivational counseling targeting healthy lifestyle. Patient was instructed on actions for crisis situations, understood and agreed to follow instructions for handling crisis, including coming to ER or calling 911 should the patient or their family feel that they are in danger of hurting self or others. Patient also was given Suicide Prevention Lifeline number 1-889.554.8568 and provided with instructions on its use.

## 2023-07-21 NOTE — BH INPATIENT PSYCHIATRY DISCHARGE NOTE - NSDCMRMEDTOKEN_GEN_ALL_CORE_FT
atorvastatin 10 mg oral tablet: 1 tab(s) orally once a day (at bedtime)  diphenhydrAMINE 50 mg oral capsule: 1 cap(s) orally once a day (at bedtime) as needed for  insomnia  divalproex sodium 500 mg oral delayed release tablet: 1 tab(s) orally 2 times a day Take 1 tablet orally in the morning, take 2 tablets orally at bedtime  divalproex sodium 500 mg oral delayed release tablet: 2 tab(s) orally once a day (at bedtime)  gabapentin 400 mg oral capsule: 1 cap(s) orally 3 times a day  guanFACINE 1 mg oral tablet, extended release: 2 tab(s) orally once a day (at bedtime)  hydrOXYzine hydrochloride 50 mg oral tablet: 1 tab(s) orally every 6 hours as needed for  anxiety  insulin lispro 100 units/mL injectable solution: 5 unit(s) injectable 3 times a day (with meals)  levothyroxine 50 mcg (0.05 mg) oral tablet: 1 tab(s) orally once a day  loratadine 10 mg oral tablet: 1 tab(s) orally once a day  metFORMIN 1000 mg oral tablet: 1 tab(s) orally 2 times a day (with meals)  mirtazapine 30 mg oral tablet: 1 tab(s) orally once a day (at bedtime)  nystatin 100,000 units/g topical powder: Apply topically to affected area 2 times a day to b/l  groin &amp; scrotum  pantoprazole 40 mg oral delayed release tablet: 1 tab(s) orally once a day  risperiDONE 3 mg oral tablet: 1 tab(s) orally 2 times a day  senna leaf extract oral tablet: 2 tab(s) orally once a day (at bedtime)  tamsulosin 0.4 mg oral capsule: 1 cap(s) orally once a day (at bedtime)

## 2023-07-21 NOTE — BH INPATIENT PSYCHIATRY DISCHARGE NOTE - NSBHASSESSSUMMFT_PSY_ALL_CORE
see progress note Patient is a 33 year old  male, single, domiciled at [facility]. Patient has PPH of moderate ID, ASD, impulse control disorder, factitious disorder, HAVEN, mood disorders, PTSD and ADHD. Patient has extensive history of inpatient hospitalization and ED visits for both medical/psych complaints, most recently was discharged from [facility] on 6/6/23, longstanding h/o SIB (head banging, cutting), but no known SA, longstanding h/o endorsing SI, h/o aggression toward staff at , PMHx significant for asthma, DM, urinary retention, GERD, BPH, hypothyroidism, HLD, HTN, and b/l myopia. Patient was admitted to [facility] for self inflicted laceration to abdomen, endorsing CAH to hurt himself and staff at group home. He was transferred to ED for sinus tachy and tachypnea and eventually admitted for unresolved PNA. On [facility] admission [facility]/23-6/[facility] for PNA treated with Ceftriaxone; no WBC elevation on readmit, RVP neg - CTPA: no PE, Tree-in-bud and nodular infiltrates scattered throughout the right upper and lower lobes, blood cultures NG x 5 days, s/p Zosyn 5 days. Patient medically cleared and now transferred back to [facility].      Working Diagnoses:  ASD  Impulse control disorder  PTSD      Plan:  >Discharge patient    >Psychiatric Meds:  Continue Risperidone 3mg AM, BID for psychosis/mood  Continue Depakote DR 500mg AM, 1000mg HS for mood  Continue Intuniv 2mg, HS for impulsivity/aggression  Continue Gabapentin 400mg, TID for anxiety  Continue Mirtazapine 30mg, HS for depression      PRN medications:  Atarax 50mg, Q6H PRN for anxiety    >Dispo: [facility]

## 2023-07-21 NOTE — BH INPATIENT PSYCHIATRY DISCHARGE NOTE - ATTENDING DISCHARGE PHYSICAL EXAMINATION:
On day of discharge, patient is calm and cooperative, reports improved mood and anxiety, affect more appropriate range, denies SI/HI, appropriate safety planning, denies AH/VH, no evidence of response to internal stimuli, no overt delusions, baseline speech and organization, taking medication and reporting benefit, denying significant SEs and expressing intention to continue taking medication and engaging in treatment upon discharge, baseline somewhat limited insight and judgment, continued limited distress tolerance though partially improved, continued somatic preoccupation though more able to tolerate anxiety around health. At time of discharge, patient reported left hip soreness since playing basketball yesterday evening, ambulates without difficulty, no gross deformity, refused physical exam or medical hospitalist consult, preference to go back to residence and follow-up if needed, discussed potential risks, warning symptoms/signs to watch for, discussed with staff from residence who came to pick patient up, will go to emergency room if needed. Although continued chronically elevated risk acute risk is sufficiently reduced to be appropriate for discharge and in context of risk of exacerbation posed by further extended hospitalization

## 2023-07-21 NOTE — BH INPATIENT PSYCHIATRY DISCHARGE NOTE - OTHER PAST PSYCHIATRIC HISTORY (INCLUDE DETAILS REGARDING ONSET, COURSE OF ILLNESS, INPATIENT/OUTPATIENT TREATMENT)
Pt was admitted medically for pneumonia and was readmitted to [facility]. From [facility], pt was readmitted to medicine as he was not medically stable. Pt is now readmitted to [facility] and the following is from this writer's psychosocial assessment one month ago.     Pt is a 32YO single white man, domiciled in [facility] in [place], BIBEMS activated by self in the context of cutting his stomach with the metal screws from his eyeglasses, superficial lacerations were evident and no sutures provided while in medicine. PPH of moderate ID, ASD, impulse control disorder, factitious disorder, HAVEN, mood disorder, PTSD, and ADHD. Pt has had multiple hospitalizations, most recently discharged last week from [facility] and was a rapid readmit. Pt has a hx of self-injurious behaviors, such as head banging and cutting. Pt has hx of making suicidal statements but no hx of suicide attempts, hx of aggression and violence toward staff. Pt endorses CAH in the context of voices telling him to kill himself.   Pt has a medical hx of asthma, DM, urinary retention, GERD, Hypothyroidism, HLD, HTN, b/l myopia.   Pt does not have any legal hx.    Medicaid: [id number]

## 2023-07-21 NOTE — BH INPATIENT PSYCHIATRY DISCHARGE NOTE - NSBHFUPINTERVALCCFT_PSY_A_CORE
Discharge Progress Note Date and Time: 07-21-23 @ 08:46  Discharge Progress Note Date and Time: 07-21-23 @ 08:46    follow up for mood/SIB

## 2023-07-21 NOTE — BH INPATIENT PSYCHIATRY DISCHARGE NOTE - NSBHMETABOLIC_PSY_ALL_CORE_FT
BMI: BMI (kg/m2): 40.3 (07-19-23 @ 11:46)  HbA1c: A1C with Estimated Average Glucose Result: 6.7 % (06-24-23 @ 06:10)    Glucose: POCT Blood Glucose.: 192 mg/dL (07-21-23 @ 07:19)    BP: 124/70 (07-19-23 @ 08:40) (124/70 - 124/70)  Lipid Panel: Date/Time: 06-24-23 @ 06:10  Cholesterol, Serum: 118  Direct LDL: --  HDL Cholesterol, Serum: 25  Total Cholesterol/HDL Ration Measurement: --  Triglycerides, Serum: 155

## 2023-07-21 NOTE — BH INPATIENT PSYCHIATRY DISCHARGE NOTE - NSDCCPCAREPLAN_GEN_ALL_CORE_FT
PRINCIPAL DISCHARGE DIAGNOSIS  Diagnosis: Autism spectrum disorder  Assessment and Plan of Treatment:       SECONDARY DISCHARGE DIAGNOSES  Diagnosis: Post traumatic stress disorder (PTSD)  Assessment and Plan of Treatment:     Diagnosis: History of impulse control disorder  Assessment and Plan of Treatment:

## 2023-07-21 NOTE — BH INPATIENT PSYCHIATRY DISCHARGE NOTE - NSBHFUPINTERVALHXFT_PSY_A_CORE
see progress note Patient is followed up for mood and SIB. Chart, medications and labs reviewed. Patient is discussed during morning brief, no events reported overnight, in fair behavioral control.  Patient was seen and is evaluated on the unit. He reports having good sleep and appetite yesterday. He states that he is excited about being discharged, is committed to go to his program and plans on saving up money to buy himself an iPhone 13 and PS5. Patient has been compliant with medications on the unit. He denies SIB/SI plan or intent, when asked about having thoughts to hurt himself he states "hell no". He states that he will play games, practice deep breathing exercises, and go for walks when feeling frustrated. Patient has been attending groups and is visible on the unit. He reports having pain in his hip after throwing a football yesterday evening, does verbalize that pain has improved from yesterday w/o use of analgesics. No visualized impairment in gait or difficulty with ambulation. He is offered evaluation by hospitalist, although refuses at this time. Patient is encouraged to rest and limit physical activity to avoid worsening of pain, informed about utilization of ibuprofen as needed. Writer spoke and discussed this with patient's , Candy. She is advised to seek medical attention for patient if no improvement or worsening of hip pain, discussed urology follow up.

## 2023-07-22 ENCOUNTER — EMERGENCY (EMERGENCY)
Facility: HOSPITAL | Age: 33
LOS: 1 days | Discharge: HOME | End: 2023-07-22
Attending: EMERGENCY MEDICINE
Payer: MEDICAID

## 2023-07-22 VITALS — WEIGHT: 300.05 LBS | HEIGHT: 71 IN

## 2023-07-22 VITALS
TEMPERATURE: 98 F | DIASTOLIC BLOOD PRESSURE: 88 MMHG | SYSTOLIC BLOOD PRESSURE: 124 MMHG | RESPIRATION RATE: 20 BRPM | OXYGEN SATURATION: 97 % | HEART RATE: 81 BPM

## 2023-07-22 DIAGNOSIS — Z90.79 ACQUIRED ABSENCE OF OTHER GENITAL ORGAN(S): Chronic | ICD-10-CM

## 2023-07-22 PROCEDURE — 71045 X-RAY EXAM CHEST 1 VIEW: CPT

## 2023-07-22 PROCEDURE — 96374 THER/PROPH/DIAG INJ IV PUSH: CPT

## 2023-07-22 PROCEDURE — 73502 X-RAY EXAM HIP UNI 2-3 VIEWS: CPT | Mod: 26,LT

## 2023-07-22 PROCEDURE — 71045 X-RAY EXAM CHEST 1 VIEW: CPT | Mod: 26

## 2023-07-22 PROCEDURE — 73502 X-RAY EXAM HIP UNI 2-3 VIEWS: CPT

## 2023-07-22 PROCEDURE — 99284 EMERGENCY DEPT VISIT MOD MDM: CPT

## 2023-07-22 PROCEDURE — 99284 EMERGENCY DEPT VISIT MOD MDM: CPT | Mod: 25

## 2023-07-22 PROCEDURE — 93005 ELECTROCARDIOGRAM TRACING: CPT

## 2023-07-22 PROCEDURE — 93010 ELECTROCARDIOGRAM REPORT: CPT

## 2023-07-22 RX ORDER — KETOROLAC TROMETHAMINE 30 MG/ML
15 SYRINGE (ML) INJECTION ONCE
Refills: 0 | Status: DISCONTINUED | OUTPATIENT
Start: 2023-07-22 | End: 2023-07-22

## 2023-07-22 RX ADMIN — Medication 15 MILLIGRAM(S): at 21:23

## 2023-07-22 NOTE — ED PROVIDER NOTE - PHYSICAL EXAMINATION
General: Well appearing, no acute distress, appears stated age  HEENT: normocephalic, atraumatic   Respiratory: normal work of breathing  MSK: no swelling, mild tenderness at left hip, moving all extremities spontaneously   Skin: warm, dry  Neuro: A&Ox3, cranial nerves II-XII intact, 5/5 strength in all extremities, no sensory deficits, normal gait   Psych: appropriate affect

## 2023-07-22 NOTE — ED PROVIDER NOTE - NSFOLLOWUPINSTRUCTIONS_ED_ALL_ED_FT
Sprain    WHAT YOU NEED TO KNOW:    Sprain  R.I.C.E. + NSAIDS (Iburpofen or Naproxen) : Pain and swelling management    Rest  ICE 20 minutes every time you have swelling   Compression with an ACE BANDAGE  Elevate on a pillow  Ibuprofen (Motrin)  600 mg every 6 hours or Naproxen (Aleeve) 500 mg every 12 hours     See a orthopedic surgeon or your PMD for an MRI     A sprain happens when a ligament is stretched or torn. Ligaments are tough tissues that connect bones. Ligaments support your joints and keep your bones in place. They allow you to lift, lower, or rotate your arms and legs. A sprain may involve one or more ligaments.     DISCHARGE INSTRUCTIONS:    Return to the emergency department if:     You have numbness or tingling below the injury, such as in your fingers or toes.      The skin over your sprained area is blue or pale.       Your pain has increased or returned, even after you take pain medicine.    Contact your healthcare provider if:     Your symptoms do not better.      Your swelling has increased or returned.      Your joint becomes more weak or unstable.      You have questions or concerns about your condition or care.    Medicines:     Prescription pain medicine may be given. Ask how to take this medicine safely.      Acetaminophen decreases pain and fever. It is available without a doctor's order. Ask how much to take and how often to take it. Follow directions. Acetaminophen can cause liver damage if not taken correctly.      NSAIDs, such as ibuprofen, help decrease swelling, pain, and fever. This medicine is available with or without a doctor's order. NSAIDs can cause stomach bleeding or kidney problems in certain people. If you take blood thinner medicine, always ask your healthcare provider if NSAIDs are safe for you. Always read the medicine label and follow directions.      Take your medicine as directed. Contact your healthcare provider if you think your medicine is not helping or if you have side effects. Tell him or her if you are allergic to any medicine. Keep a list of the medicines, vitamins, and herbs you take. Include the amounts, and when and why you take them. Bring the list or the pill bottles to follow-up visits. Carry your medicine list with you in case of an emergency.    Support devices: Support services, such as an elastic bandage, splint, brace, or cast may be needed. These devices limit your movement and protect your joint. You may need to use crutches if the sprain is in your leg. This will help decrease your pain as you move around.     Self-care:     Rest your joint so that it can heal. Return to normal activities as directed.      Apply ice on your injury for 15 to 20 minutes every hour or as directed. Use an ice pack, or put crushed ice in a plastic bag. Cover it with a towel. Ice helps prevent tissue damage and decreases swelling and pain.      Compress the injured area as directed. Ask your healthcare provider if you should wrap an elastic bandage around your injured ligament. An elastic bandage provides support and helps decrease swelling and movement so your joint can heal.       Elevate the injured area above the level of your heart as often as you can. This will help decrease swelling and pain. Prop the injured area on pillows or blankets to keep it elevated comfortably.     Physical therapy: A physical therapist teaches you exercises to help improve movement and strength, and to decrease pain.     Prevent another sprain: Regular exercise can strengthen your muscles and help prevent another injury. Do the following before you begin or return to regular exercise or sports training:     Ask your healthcare provider about the activities you can do. Find out how long your ligament needs to heal. Do not do any physical activity until your healthcare provider says it is okay. If you start activity too soon, you may develop a more serious injury.       Always warm up and stretch before your regular exercise, sport, or physical activity.       Take it slow. Slowly increase how often and how long you exercise or train. Sudden increases in how often you train may cause you to overstretch or tear your ligament.       Use the right equipment. Always wear shoes that fit well and are made for the activity that you are doing. You may also use ankle supports, elbow and knee pads, or braces.     Follow up with your healthcare provider as directed: Write down your questions so you remember to ask them during your visits.
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
## 2023-07-22 NOTE — ED PROVIDER NOTE - PATIENT PORTAL LINK FT
You can access the FollowMyHealth Patient Portal offered by [facility] by registering at the following website: [email or web address]. By joining [employer]’s FollowMyHealth portal, you will also be able to view your health information using other applications (apps) compatible with our system.

## 2023-07-23 NOTE — ED ADULT NURSE NOTE - OBJECTIVE STATEMENT
No answer, left message ?     yes      / patient called back at [phone]                  Unable to leave message ? When were you told to arrive at hospital ?  0600    Do you have a  ? yes    Are you on any blood thinners ? no                If yes when did you stop taking ? Do you have your prep Rx filled and instruction ? N/a  Nothing to eat the day before , only clear liquids. N/a  Are you experiencing any covid symptoms ?   no  Do you have any infections or rash we should be aware of ?  none    Do you have the Hibiclens soap to use the night before and the morning of surgery ? N/a    Nothing to eat or drink after midnight, only a sip of water to take any medication instructed to take the night before. Wear comfortable clothing, leave any valuables at home, remove any jewelry and body piercing .    advised
cognitvely delayed. stated he wants to kill himself, attempting to stab himself in the abd. a shallow wound was noted in the RLQ that is slightly bleeding. pt denies he has chest pain but stated he is suicidal. MD  made aware. caregiver at bedside. flat affect.

## 2023-07-23 NOTE — ED ADULT NURSE NOTE - NSFALLUNIVINTERV_ED_ALL_ED
Bed/Stretcher in lowest position, wheels locked, appropriate side rails in place/Call bell, personal items and telephone in reach/Instruct patient to call for assistance before getting out of bed/chair/stretcher/Non-slip footwear applied when patient is off stretcher/[place] to call system/Physically safe environment - no spills, clutter or unnecessary equipment/Purposeful proactive rounding/Room/bathroom lighting operational, light cord in reach

## 2023-07-24 ENCOUNTER — EMERGENCY (EMERGENCY)
Facility: HOSPITAL | Age: 33
LOS: 1 days | Discharge: HOME | End: 2023-07-24
Admitting: EMERGENCY MEDICINE
Payer: MEDICAID

## 2023-07-24 VITALS
TEMPERATURE: 98 F | SYSTOLIC BLOOD PRESSURE: 134 MMHG | HEART RATE: 100 BPM | OXYGEN SATURATION: 97 % | RESPIRATION RATE: 18 BRPM | DIASTOLIC BLOOD PRESSURE: 90 MMHG | HEIGHT: 71 IN

## 2023-07-24 DIAGNOSIS — Z90.79 ACQUIRED ABSENCE OF OTHER GENITAL ORGAN(S): Chronic | ICD-10-CM

## 2023-07-24 LAB
APPEARANCE UR: CLEAR — SIGNIFICANT CHANGE UP
BILIRUB UR-MCNC: NEGATIVE — SIGNIFICANT CHANGE UP
COLOR SPEC: SIGNIFICANT CHANGE UP
DIFF PNL FLD: NEGATIVE — SIGNIFICANT CHANGE UP
GLUCOSE UR QL: NEGATIVE MG/DL — SIGNIFICANT CHANGE UP
KETONES UR-MCNC: ABNORMAL MG/DL
LEUKOCYTE ESTERASE UR-ACNC: NEGATIVE — SIGNIFICANT CHANGE UP
NITRITE UR-MCNC: NEGATIVE — SIGNIFICANT CHANGE UP
PH UR: 5.5 — SIGNIFICANT CHANGE UP (ref 5–8)
PROT UR-MCNC: SIGNIFICANT CHANGE UP MG/DL
SP GR SPEC: 1.03 — HIGH (ref 1–1.03)
UROBILINOGEN FLD QL: 1 MG/DL — SIGNIFICANT CHANGE UP (ref 0.2–1)

## 2023-07-24 PROCEDURE — 90792 PSYCH DIAG EVAL W/MED SRVCS: CPT | Mod: GC

## 2023-07-24 PROCEDURE — 76870 US EXAM SCROTUM: CPT | Mod: 26

## 2023-07-24 PROCEDURE — 99285 EMERGENCY DEPT VISIT HI MDM: CPT

## 2023-07-24 RX ORDER — TETANUS TOXOID, REDUCED DIPHTHERIA TOXOID AND ACELLULAR PERTUSSIS VACCINE, ADSORBED 5; 2.5; 8; 8; 2.5 [IU]/.5ML; [IU]/.5ML; UG/.5ML; UG/.5ML; UG/.5ML
0.5 SUSPENSION INTRAMUSCULAR ONCE
Refills: 0 | Status: COMPLETED | OUTPATIENT
Start: 2023-07-24 | End: 2023-07-24

## 2023-07-24 RX ADMIN — TETANUS TOXOID, REDUCED DIPHTHERIA TOXOID AND ACELLULAR PERTUSSIS VACCINE, ADSORBED 0.5 MILLILITER(S): 5; 2.5; 8; 8; 2.5 SUSPENSION INTRAMUSCULAR at 23:01

## 2023-07-24 NOTE — ED ADULT NURSE NOTE - NSFALLUNIVINTERV_ED_ALL_ED
Bed/Stretcher in lowest position, wheels locked, appropriate side rails in place/Call bell, personal items and telephone in reach/Instruct patient to call for assistance before getting out of bed/chair/stretcher/Non-slip footwear applied when patient is off stretcher/[place] to call system/Physically safe environment - no spills, clutter or unnecessary equipment/Purposeful proactive rounding/Room/bathroom lighting operational, light cord in reach

## 2023-07-24 NOTE — ED BEHAVIORAL HEALTH ASSESSMENT NOTE - DESCRIPTION
See hpi Vital Signs Last 24 Hrs  T(C): 36.9 (24 Jul 2023 18:52), Max: 36.9 (24 Jul 2023 18:52)  T(F): 98.5 (24 Jul 2023 18:52), Max: 98.5 (24 Jul 2023 18:52)  HR: 100 (24 Jul 2023 18:52) (100 - 100)  BP: 134/90 (24 Jul 2023 18:52) (134/90 - 134/90)  BP(mean): --  RR: 18 (24 Jul 2023 18:52) (18 - 18)  SpO2: 97% (24 Jul 2023 18:52) (97% - 97%)    Parameters below as of 24 Jul 2023 18:52  Patient On (Oxygen Delivery Method): room air

## 2023-07-24 NOTE — ED PROVIDER NOTE - PATIENT PORTAL LINK FT
You can access the FollowMyHealth Patient Portal offered by [facility] by registering at the following website: [email or web address]. By joining [employer]’s FollowMyHealth portal, you will also be able to view your health information using other applications (apps) compatible with our system.
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
## 2023-07-24 NOTE — ED BEHAVIORAL HEALTH ASSESSMENT NOTE - OTHER
not observed reported SI during incident but now denies Group Home concrete chronically poor reported SI during incident but now denies. CURRENTLY NO PASSIVE OR ACTIVE SUICIDAL OR HOMICIDAL THOUGHTS ELICITED easily distracted
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
## 2023-07-24 NOTE — ED PROVIDER NOTE - OBJECTIVE STATEMENT
This is a 33 yo M, single, disabled, non-caregiver, resides at a [facility], with psych h/o moderate ID, ASD, impulse control disorder, factitious disorder, HAVEN, mood disorders, PTSD and ADHD, multiple past psych admissions, with acting out self injures behaviour. Arrived with ems after breaking his eyeglasses and tried to stab himself in the stomach.   Upon arrival inappropriate affect, reports he was mad at his caregiver because he did not take him to the bank to deposit his money. He wanted This is a 33 yo M, single, disabled, non-caregiver, resides at a [facility], with psych h/o moderate ID, ASD, impulse control disorder, factitious disorder, HAVEN, mood disorders, PTSD and ADHD, multiple past psych admissions, with acting out self injures behaviour. Arrived with ems after breaking his eyeglasses and tried to stab himself in the stomach. ( small non bleeding abrasion to abd)  Upon arrival inappropriate affect, reports he was mad at his caregiver because he did not take him to the bank to deposit his money. He wanted to go and they ignored him. c/o left hip pain, states he fell while playing football in a while back.

## 2023-07-24 NOTE — ED BEHAVIORAL HEALTH NOTE - BEHAVIORAL HEALTH NOTE
Writer contacted  bianca [phone] to obtain collateral information. the following information is per bianca.    patient is a 34 Yo male domiciled at [facility], hx of autism, bib EMS activated by staff due ot self harm. She says patient endorsed hearing voices which told him to harm himself. She reports pt called 911 himself. She says when the police arrived, pt was dysregulated, running around the place and having a behavioral outburst. She reports the patient did cut his stomach after breaking his glasses. She says pt does not hear voices at baseline. She says patient has a hx of self injurious behavior but has no attempts to end his life. In the past he has broke a wrist and cut his skins. She reports patient is compliant w/ medication w/ encouragement. She says patient was discharged from [facility] after 1 month on Friday 07/21/23. She says patient went to [facility] for hearing voices and [facility] for chets and hip pain over the weekend. She reports the patient was between [facility] and [facility] medical unit for the past month due to psych admission and pneumonia. Patient is enrolled w/ [facility] and day program but does not attend program. She reports medical problems include testicle issues and potentially cancer. She says they have been doing screening and he saw a urologist today. She reports the patient got a testicle removed this year.  She reports patient was also recently diagnosed with diabetes. She says patient’s sleep has bene poor, she is unsure of hygiene and appetite. She says if patient is discharged staff would have to come pick the patient up. She is unsure what the patient needs at this time. Writer contacted  bianca [phone] to obtain collateral information. the following information is per bianca.    patient is a 32 Yo male domiciled at [facility], hx of autism, bib EMS activated by staff due ot self harm. She says patient endorsed hearing voices which told him to harm himself. She reports pt called 911 himself. She says when the police arrived, pt was dysregulated, running around the place and having a behavioral outburst. She reports the patient did cut his stomach after breaking his glasses. She says pt does not hear voices at baseline. She says patient has a hx of self injurious behavior but has no attempts to end his life. In the past he has broke a wrist and cut his skins. She reports patient is compliant w/ medication w/ encouragement. She says patient was discharged from [facility] after 1 month on Friday 07/21/23. She says patient went to [facility] for hearing voices and [facility] for chets and hip pain over the weekend. She reports the patient was between [facility] and [facility] medical unit for the past month due to psych admission and pneumonia. Patient is enrolled w/ [facility] day program but does not attend program. She reports medical problems include testicle issues and potentially cancer. She says they have been doing screening and he saw a urologist today. She reports the patient got a testicle removed this year.  She reports patient was also recently diagnosed with diabetes. She says patient’s sleep has bene poor, she is unsure of hygiene and appetite. She says if patient is discharged staff would have to come pick the patient up. She is unsure what the patient needs at this time. phone number for the residence is .  patient's residence [facility] program address is [address] [place] [address]. Writer contacted  bianca [phone] to obtain collateral information. the following information is per bianca.    patient is a 32 Yo male domiciled at [facility], hx of autism, bib EMS activated by staff due ot self harm. She says patient endorsed hearing voices which told him to harm himself. She reports pt called 911 himself. She says when the police arrived, pt was dysregulated, running around the place and having a behavioral outburst. She reports the patient did cut his stomach after breaking his glasses. She says pt does not hear voices at baseline. She says patient has a hx of self injurious behavior but has no attempts to end his life. In the past he has broke a wrist and cut his skins. She reports patient is compliant w/ medication w/ encouragement. She says patient was discharged from [facility] after 1 month on Friday 07/21/23. She says patient went to [facility] for hearing voices and [facility] for chets and hip pain over the weekend. She reports the patient was between [facility] and [facility] medical unit for the past month due to psych admission and pneumonia. Patient is enrolled w/ [facility] day program but does not attend program. She reports medical problems include testicle issues and potentially cancer. She says they have been doing screening and he saw a urologist today. She reports the patient got a testicle removed this year.  She reports patient was also recently diagnosed with diabetes. She says patient’s sleep has bene poor, she is unsure of hygiene and appetite. She says if patient is discharged staff would have to come pick the patient up. She is unsure what the patient needs at this time. phone number for the residence is .  patient's [facility] address is [address] [place] [address].    Writer contacted  bianca ([phone]) to inquire about last tetanus shot and [facility]. She agreed to find out this information and call writer back. Writer contacted  bianca [phone] to obtain collateral information. the following information is per bianca.    patient is a 34 Yo male domiciled at [facility], hx of autism, bib EMS activated by staff due ot self harm. She says patient endorsed hearing voices which told him to harm himself. She reports pt called 911 himself. She says when the police arrived, pt was dysregulated, running around the place and having a behavioral outburst. She reports the patient did cut his stomach after breaking his glasses. She says pt does not hear voices at baseline. She says patient has a hx of self injurious behavior but has no attempts to end his life. In the past he has broke a wrist and cut his skins. She reports patient is compliant w/ medication w/ encouragement. She says patient was discharged from [facility] after 1 month on Friday 07/21/23. She says patient went to [facility] for hearing voices and [facility] for chets and hip pain over the weekend. She reports the patient was between [facility] and [facility] medical unit for the past month due to psych admission and pneumonia. Patient is enrolled w/ [facility] day program but does not attend program. She reports medical problems include testicle issues and potentially cancer. She says they have been doing screening and he saw a urologist today. She reports the patient got a testicle removed this year.  She reports patient was also recently diagnosed with diabetes. She says patient’s sleep has bene poor, she is unsure of hygiene and appetite. She says if patient is discharged staff would have to come pick the patient up. She is unsure what the patient needs at this time. phone number for the residence is .  [facility]'s [facility] address is [address] [place] [address].    Writer contacted  bianca ([phone]) to inquire about last tetanus shot and what was done at urologist apt today. She reports the tetanus shot did not have a date on the paper. patient had a urologist apt today w/ dr kim sinha  ([phone]) for an assessment due to growth on the testicle. She says the doctor plans to schedule appointments for further testing. Writer contacted  bianca [phone] to obtain collateral information. the following information is per bianca.    patient is a 32 Yo male domiciled at [facility], hx of autism, bib EMS activated by staff due ot self harm. She says patient endorsed hearing voices which told him to harm himself. She reports pt called 911 himself. She says when the police arrived, pt was dysregulated, running around the place and having a behavioral outburst. She reports the patient did cut his stomach after breaking his glasses. She says pt does not hear voices at baseline. She says patient has a hx of self injurious behavior but has no attempts to end his life. In the past he has broke a wrist and cut his skins. She reports patient is compliant w/ medication w/ encouragement. She says patient was discharged from [facility] after 1 month on Friday 07/21/23. She says patient went to [facility] for hearing voices and [facility] for chets and hip pain over the weekend. She reports the patient was between [facility] and [facility] medical unit for the past month due to psych admission and pneumonia. Patient is enrolled w/ [facility] program but does not attend program. She reports medical problems include testicle issues and potentially cancer. She says they have been doing screening and he saw a urologist today. She reports the patient got a testicle removed this year.  She reports patient was also recently diagnosed with diabetes. She says patient’s sleep has bene poor, she is unsure of hygiene and appetite. She says if patient is discharged staff would have to come pick the patient up. She is unsure what the patient needs at this time. phone number for the residence is .  patient's [facility] address is [address] [place] [address].    Writer contacted  bianca ([phone]) to inquire about last tetanus shot and what was done at urologist apt today. She reports the tetanus shot did not have a date on the paper. patient had a urologist apt today w/ dr kim sinha  ([phone]) for an assessment due to growth on the testicle. She says the doctor plans to schedule appointments for further testing.    Per provider, SAUNDRA Lake, patient is cleared and is able to return to their previous residence, [facility].  has spoken to  bianca ([phone])at [facility] located at [address] [place] [address],  confirmed that patients mode of transportation is Staff  transportation and that patient travels WITH staff SUPERVISION. Clinical provider is in agreement with Staff transportation back to group home. Verbal huddle regarding coordination of care completed with interdisciplinary team.   Transportation coordinated via  bianca ([phone]) Writer contacted  bianca [phone] to obtain collateral information. the following information is per bianca.    patient is a 32 Yo male domiciled at [facility], hx of autism, bib EMS activated by staff due ot self harm. She says patient endorsed hearing voices which told him to harm himself. She reports pt called 911 himself. She says when the police arrived, pt was dysregulated, running around the place and having a behavioral outburst. She reports the patient did cut his stomach after breaking his glasses. She says pt does not hear voices at baseline. She says patient has a hx of self injurious behavior but has no attempts to end his life. In the past he has broke a wrist and cut his skins. She reports patient is compliant w/ medication w/ encouragement. She says patient was discharged from [facility] after 1 month on Friday 07/21/23. She says patient went to [facility] [place] [facility] for hearing voices and [facility] for chets and hip pain over the weekend. She reports the patient was between [facility] and [facility] medical unit for the past month due to psych admission and pneumonia. Patient is enrolled w/ [facility] day program but does not attend program. She reports medical problems include testicle issues and potentially cancer. She says they have been doing screening and he saw a urologist today. She reports the patient got a testicle removed this year.  She reports patient was also recently diagnosed with diabetes. She says patient’s sleep has bene poor, she is unsure of hygiene and appetite. She says if patient is discharged staff would have to come pick the patient up. She is unsure what the patient needs at this time. phone number for the residence is .  patient's residence [facility] address is [address] [place] [address].    Writer contacted  bianca ([phone]) to inquire about last tetanus shot and what was done at urologist apt today. She reports the tetanus shot did not have a date on the paper. patient had a urologist apt today w/ dr kim sinha  ([phone]) for an assessment due to growth on the testicle. She says the doctor plans to schedule appointments for further testing.    Per provider, SAUNDRA Lake, patient is cleared and is able to return to their previous residence, [facility].  has spoken to  bianca ([phone])at [facility] located at [address] [place] [address],  confirmed that patients mode of transportation is Staff  transportation and that patient travels WITH staff SUPERVISION. Clinical provider is in agreement with Staff transportation back to group home. Verbal huddle regarding coordination of care completed with interdisciplinary team.   Transportation coordinated via  bianca ([phone])    Staff member Nash ([phone]) p/u 1AM.

## 2023-07-24 NOTE — ED BEHAVIORAL HEALTH ASSESSMENT NOTE - CURRENT MEDICATION
Risperdal 3mg BID, Depakote 500mg qAM 1000mg qPM, Remeron 30mg QHS,  Guanfacine 2mg qhs, Atorvastatin 10mg hs, Loratidine 10mg daily, Levothyroxine 50mcg daily, Metformin 1000mg BID. Pantoprazole 20mg daily, Tamsulosin 0.4mg QHS, Ferrous Sulfate 325mg daily, senna 8.6mg 2 tabs QHS. Gabapentin 400mg tid. Hydroxyzine hcl 50mg q6h PRN anxiety.

## 2023-07-24 NOTE — ED BEHAVIORAL HEALTH ASSESSMENT NOTE - DETAILS
denies SI reports L hip pain see  Chart Note by SW after hours see Safety Plan in chart [facility]- 7/13-21/2023 Per chart has had a rash in response to Zyprexa Per chart has prior trauma(details unknown)

## 2023-07-24 NOTE — ED ADULT NURSE REASSESSMENT NOTE - NS ED NURSE REASSESS COMMENT FT1
Pt returned from US testing via wheelchair transport. Pt remains awake, calm at baseline mental status. VSS, respirations are even and unlabored, denies current pain. US Results pending. Will continue to monitor for safety.

## 2023-07-24 NOTE — ED ADULT TRIAGE NOTE - CHIEF COMPLAINT QUOTE
Pt c/o suicidal ideation X 1 month, pt tried to cut himself with his glasses earlier today. Small wound noted to abdomen. Denies HI, hallucinations, drugs/alcohol use. Hx of depression, bi-polar, autism. .

## 2023-07-24 NOTE — ED PROVIDER NOTE - NSFOLLOWUPINSTRUCTIONS_ED_ALL_ED_FT
DURING YOUR VISIT ULTRA SOUND OF YOUR TESTICLES WERE DONE, RESULTED NO FURTHER ACUTE WORK UP, BUT RECOMMENDING FURTHER TESTING WITH YOUR OUT PATIENT UROLOGIST.         Impulse control disorders are a group of mental health disorders in which a person is unable to control his or her sudden desire to do something (impulse). People who are unable to control impulses repeatedly act without planning or thinking about consequences. A person with an impulse control disorder may:  Have outbursts of anger and argue with authority figures.Be physically or verbally aggressive toward others.Regularly break rules or laws. This may include harming people, animals, or property.Steal, start fires, quiñonez, or engage in other risky behaviors.Impulse control disorders typically start in the late teenage to early adult years. People with impulse control disorders often have emotional disorders or other forms of mental illness, such as drug abuse or other addiction problems. Over time, impulse disorders may cause problems in relationships and may result in legal problems.  What are the causes?  The cause of these conditions is not known.  What increases the risk?  The following factors may make you more likely to develop this condition:  Experiencing abuse or neglect during childhood.Experiencing physical or emotional trauma during childhood.Having a parent or sibling with an impulse control disorder.Having another mental health condition, such as ADHD (attention deficit hyperactivity disorder) or a substance abuse disorder.What are the signs or symptoms?  Unlike people with other mental health, substance abuse, or general medical conditions, people with impulse control disorders cannot keep from acting on their impulses. They may:  Have trouble controlling emotions, especially anger.Feel like they must act on an impulse when they experience strong emotions or stress.Have specific impulsive behaviors that relieve tension, such as setting a fire or stealing.Have anxiety, tension, or excitement before or during the impulsive behavior.Feel relief or pleasure while acting on the impulse, or after acting on it.Act without regard for the safety of themselves or others.Act without planning ahead.Feel regret or guilt after acting on an impulse.Symptoms of impulse control disorders differ based on the specific disorder.  How is this diagnosed?  These disorders are diagnosed through an assessment by your health care provider. Your health care provider will ask questions about:  Your impulsive behavior.Your moods.Your thoughts.Past and recent life events.Your medical history.Your use of alcohol or drugs, including prescription medicines.Certain medical conditions, other mental illnesses, and certain substances can cause symptoms similar to impulse control disorders. You may be referred to a mental health specialist.  How is this treated?  Impulse control disorders may be treated with a combination of the following treatments:  Cognitive behavioral therapy (CBT). This is a form of talk therapy. It focuses on reducing negative beliefs and thoughts related to impulsive behavior and replacing them with healthier thoughts and behaviors. This may be done individually or in a group setting.Medicines.Family intervention. This is a program that educates family members about your disorder. It teaches healthy communication and problem-solving skills, and it helps family members support you.Follow these instructions at home:  Image   Take over-the-counter and prescription medicines only as told by your health care provider. Check with your health care provider before starting any new prescription or over-the-counter medicines.Keep all follow-up visits as told by your health care providers or therapists. This is important. This includes going to therapy or family intervention as directed.Find a support group to talk with peers about managing stress and impulses.Find healthy ways to recognize emotions and manage stress, such as:  Journaling.Exercise.Deep breathing, yoga, or meditation.Contact a health care provider if:  You are not able to take your medicines as prescribed.Your symptoms get worse.Get help right away if:  You think about hurting yourself or others.If you ever feel like you may hurt yourself or others, or have thoughts about taking your own life, get help right away. You can go to your nearest emergency department or call:   Your local emergency services (911 in the [place] A suicide crisis helpline, such as the National Suicide Prevention Lifeline at 1-997.711.1559. This is open 24 hours a day. Summary  Impulse control disorders are a group of mental health disorders in which a person is unable to control his or her sudden desire to do something (impulse).People with these disorders act impulsively through certain behaviors in order to feel relief from stress or emotional tension.Treatment may include cognitive behavioral therapy (CBT), medicines, family intervention, or a combination of these.This information is not intended to replace advice given to you by your health care provider. Make sure you discuss any questions you have with your health care provider.

## 2023-07-24 NOTE — ED BEHAVIORAL HEALTH ASSESSMENT NOTE - SUMMARY
Pt is a 34 yo M, single, disabled, non-caregiver, resides at a [facility], with psych h/o moderate ID, ASD, impulse control disorder, factitious disorder, HAVEN, mood disorders, PTSD and ADHD, multiple past psych admissions (last known [facility] 7/13-21/2023), numerous ED visits for both medical/psych complaints, longstanding h/o SIB (head banging, cutting), but no known SA, longstanding h/o endorsing SI, h/o aggression toward staff at , PMH significant for asthma, DM, urinary retention, GERD, BPH, hypothyroidism, HLD, HTN, and b/l myopia, on Depakote 500 mg qAM and 1000mg qPM, Guanfacine 2 mg, Risperdal 3 mg BID, and Remeron 30 mg, prescribed by his PCP, who presents to the ED for self inflicted laceration to abdomen.     Pt presents with superficial laceration to abdomen perpetrated due to frustration with staff. He denies any suicidal ideation at this time and states that he reported SI at his residence for the purpose of coming to the hospital. Reviewed coping skills and formal safety planning performed. Although pt presents with episode of NSSIB, given pt's absence of current passive or active SI, future-orientation, affect not congruent with suicidality, h/o suicidal gestures for secondary gain, and residence in supportive home, he is considered to be at low acute risk of harm to self and appropriate for discharge home.    PLAN:   Treat and release  PRN medications in ED:  - Thorazine 50mg q6h PO/IM PRN for Agitation

## 2023-07-24 NOTE — ED PROVIDER NOTE - CLINICAL SUMMARY MEDICAL DECISION MAKING FREE TEXT BOX
This is a 31 yo M, single, disabled, non-caregiver, resides at a [facility], with psych h/o moderate ID, ASD, impulse control disorder, factitious disorder, HAVEN, mood disorders, PTSD and ADHD, multiple past psych admissions, with acting out self injures behaviour. Arrived with ems after breaking his eyeglasses and tried to stab himself in the stomach. ( small non bleeding abrasion to abd)  Upon arrival inappropriate affect, reports he was mad at his caregiver because he did not take him to the bank to deposit his money. He wanted to go and they ignored him. c/o left hip pain, states he fell while playing football in a while back.  ua, psych, wound cleaned and bacitracin applied,

## 2023-07-24 NOTE — ED BEHAVIORAL HEALTH ASSESSMENT NOTE - ADDITIONAL DETAILS / COMMENTS
on examination (along with med NP), no testicular mass, no penile discharges noted; no tenderness noted on inguinal region; no mass over inguinal region  abrasion over abdomen - no active bleeding seen

## 2023-07-24 NOTE — ED PROVIDER NOTE - CARE PLAN
1 Principal Discharge DX:	Impulse control disorder  Secondary Diagnosis:	At risk for self-inflicted injury

## 2023-07-24 NOTE — ED ADULT NURSE NOTE - OBJECTIVE STATEMENT
Pt arrives via ems from group home. Pt c/o suicidal ideation X 1 month, pt tried to cut himself with his glasses earlier today. Small wound noted to abdomen. Denies HI, hallucinations, drugs/alcohol use. Hx of depression, bi-polar, autism. .

## 2023-07-24 NOTE — ED PROVIDER NOTE - PROGRESS NOTE DETAILS
NP Bereczky- psych consul cleared pt at this time no need for inpatient psychiatric hospitalization, ua unremarkable, pt c/o painful urination and upon genitourinary exam c/o discomfort to right inguinal area, further testing us of testes ordered, denies abd pain n,v dizziness, blood in the urine or stool, NP Bereczky - us testes- LEFT: Post orchiectomy. IMPRESSION:  No sonographic evidence of testicular torsion or epididymoorchitis.  Unchanged avascular hypoechoic rounded structure in the right testicle   which may represent tubular ectasia of the rete testes versus mediastinal   cyst.  Pt endorsed had unprotected sex 1 month ago with a female, urine sti testing ordered.

## 2023-07-25 ENCOUNTER — EMERGENCY (EMERGENCY)
Facility: HOSPITAL | Age: 33
LOS: 1 days | Discharge: HOME | End: 2023-07-25
Attending: EMERGENCY MEDICINE
Payer: MEDICAID

## 2023-07-25 VITALS
HEART RATE: 86 BPM | RESPIRATION RATE: 18 BRPM | TEMPERATURE: 98 F | DIASTOLIC BLOOD PRESSURE: 90 MMHG | SYSTOLIC BLOOD PRESSURE: 130 MMHG | OXYGEN SATURATION: 98 %

## 2023-07-25 VITALS
WEIGHT: 309.97 LBS | OXYGEN SATURATION: 98 % | HEIGHT: 71 IN | SYSTOLIC BLOOD PRESSURE: 120 MMHG | RESPIRATION RATE: 19 BRPM | DIASTOLIC BLOOD PRESSURE: 90 MMHG | HEART RATE: 79 BPM | TEMPERATURE: 98 F

## 2023-07-25 DIAGNOSIS — Z90.79 ACQUIRED ABSENCE OF OTHER GENITAL ORGAN(S): Chronic | ICD-10-CM

## 2023-07-25 PROCEDURE — 99284 EMERGENCY DEPT VISIT MOD MDM: CPT

## 2023-07-25 PROCEDURE — 99283 EMERGENCY DEPT VISIT LOW MDM: CPT

## 2023-07-25 RX ORDER — IBUPROFEN 200 MG
600 TABLET ORAL ONCE
Refills: 0 | Status: COMPLETED | OUTPATIENT
Start: 2023-07-25 | End: 2023-07-25

## 2023-07-25 RX ORDER — METHOCARBAMOL 500 MG/1
1500 TABLET, FILM COATED ORAL ONCE
Refills: 0 | Status: COMPLETED | OUTPATIENT
Start: 2023-07-25 | End: 2023-07-25

## 2023-07-25 RX ORDER — IBUPROFEN 200 MG
1 TABLET ORAL
Qty: 30 | Refills: 0
Start: 2023-07-25

## 2023-07-25 RX ADMIN — METHOCARBAMOL 1500 MILLIGRAM(S): 500 TABLET, FILM COATED ORAL at 04:05

## 2023-07-25 RX ADMIN — Medication 600 MILLIGRAM(S): at 04:06

## 2023-07-25 NOTE — ED PROVIDER NOTE - TOBACCO USE
Natasha Ileana presents today for a reading of her Mantoux Tuberculin Skin Test.    See lab tab for result.  0 mm induration = negative    Signed: Kristin Figueroa, HARVEY    
Never smoker

## 2023-07-25 NOTE — ED PROVIDER NOTE - CONSTITUTIONAL, MLM
Well appearing, awake, alert, oriented to person, place, time and in no apparent distress. normal...

## 2023-07-25 NOTE — ED PROVIDER NOTE - PHYSICAL EXAMINATION
B/L hip/LE:  no bony deformities, no leg length discrepancy, femoral and pedal pulses intact, cap refill  < 2 secs.

## 2023-07-25 NOTE — ED PROVIDER NOTE - NSFOLLOWUPCLINICS_GEN_ALL_ED_FT
[facility]  Orthopedics  [address] [place] [address]  Phone: [phone]  Fax: [phone]

## 2023-07-25 NOTE — ED PROVIDER NOTE - NSFOLLOWUPINSTRUCTIONS_ED_ALL_ED_FT
Return immediately if you have pain, swelling, numbness, weakness any concerns. Avoid bearing weight. Follow up with orthopedics/podiatry/primary doctor as instructed. If you need assistance with any follow up appointment call our Care Coordinator at [phone].

## 2023-07-25 NOTE — ED PROVIDER NOTE - OBJECTIVE STATEMENT
Chief complaint of left hip tenderness x 3 weeks.  Denies trauma, no fever.  Left hip x-ray reviewed from 7/22/2023 no reported fracture.  Patient walking in ED with normal gait.  Patient eager to be discharged and is in company with staff member from group home.

## 2023-07-25 NOTE — ED PROVIDER NOTE - CLINICAL SUMMARY MEDICAL DECISION MAKING FREE TEXT BOX
Left hip x-ray reviewed from 7/22/2023 no reported fracture.  Patient walking in ED with normal gait.  Patient eager to be discharged and is in company with staff member from group home.

## 2023-07-25 NOTE — ED PROVIDER NOTE - PATIENT PORTAL LINK FT
You can access the FollowMyHealth Patient Portal offered by [facility] by registering at the following website: [email or web address]. By joining [employer]’s FollowMyHealth portal, you will also be able to view your health information using other applications (apps) compatible with our system.

## 2023-07-26 ENCOUNTER — OUTPATIENT (OUTPATIENT)
Dept: OUTPATIENT SERVICES | Facility: HOSPITAL | Age: 33
LOS: 1 days | Discharge: HOME | End: 2023-07-26

## 2023-07-26 DIAGNOSIS — Z90.79 ACQUIRED ABSENCE OF OTHER GENITAL ORGAN(S): Chronic | ICD-10-CM

## 2023-07-26 DIAGNOSIS — C62.90 MALIGNANT NEOPLASM OF UNSPECIFIED TESTIS, UNSPECIFIED WHETHER DESCENDED OR UNDESCENDED: ICD-10-CM

## 2023-07-26 LAB
C TRACH RRNA SPEC QL NAA+PROBE: SIGNIFICANT CHANGE UP
N GONORRHOEA RRNA SPEC QL NAA+PROBE: SIGNIFICANT CHANGE UP

## 2023-07-27 NOTE — PATIENT PROFILE ADULT - HAVE YOU EXPERIENCED VIOLENCE OR A TRAUMATIC EVENT?
Occupational Therapy    Visit Type: initial evaluation  Co-treat with: Physical therapist  SUBJECTIVE  Patient agreed to participate in therapy this date.  Patient in chair. Notes shortness of breath greatly improved from yesterday. Patient agreeable to participate    PLOF: patient lives with adult son in single level home with 3 steps and 1 rail to enter. Has 2ww to use at home for gait. Has walk in shower with built in seat, grab bars and handheld shower head; notes showers about 1x/week. Has a tall toilet with grab bars. Has HEP for BUE and BLEs.  Has and uses sock aid, reacher, long shoe horn for dressing. Notes his son work but when home he is able to assist patient.   Patient / Family Goal: return home    Pain     At onset of session (out of 10): 0    OBJECTIVE     Cognitive Status   Orientation    - Oriented to: person, place, time and situation    Patient Activity Tolerance: no rest required      Range of Motion (ROM)   (degrees unless noted; active unless noted; norms in ( ); negative=lacking to 0, positive=beyond 0)  WFL: LUE, RUE    Strength  (out of 5 unless noted, standard test position unless noted)   WFL: LUE, RUE      Sitting Balance  (REY = base of support)  Static      - Trial 1 details: independent  Dynamic      - Trial 1 details: independent    Standing Balance  (REY = base of support)  Firm Surface: Double Leg      - Static, Eyes Open       - Trial 1 details: modified independent     - Dynamic, Eyes Open       - Trial 1 details: modified independent       Transfers  Assistive devices: 2-wheeled walker  - Sit to stand: modified independent  - Stand to sit: modified independent      Functional Ambulation  - Assistance: modified independent  - Assistive device: 2-wheeled walker  - Distance (ft):60  - Surface: even  Activities of Daily Living (ADLs)  Grooming/Oral Hygiene:   - Grooming assist: independent       - Oral hygiene assist: independent  - Position: standing at sink  Patient denies concerns  for ADLs as notes has used bathroom already without issue. Has all durable medical equipment and long handled adaptive equipment as needed   Interventions    Home Exercise Program/Education Materials: Notes has HEP for BUE and BLE and does not need review or updated copy         Education:   - Present and ready to learn: patient  Education provided during session:  - Results of above outlined education: Verbalizes understanding and Demonstrates understanding    ASSESSMENT   Patient will benefit from inpatient skilled therapy to address current assessed functional limitations and impairments.    Summary of function and discharge needs based on today's assessment:  - Current level of function: at baseline level of function  - Therapy needs at discharge: does not require ongoing therapy  AM-PAC  - Prior Level of Function:         Key: MOD A=moderate assistance, IND/MOD I=independent/modified independent  - Generalized Current Level of Function     - Current Self-Cares: 24       Scoring Key= >21 Modified Independent; 20-21 Supervision; 18-19 Minimal assist; 13-18 Moderate assist; 9-12 Max assist; <9 Total assist       • Clinical decision making: Low - Patient has few limitations (1-3), comorbidities and/or complexities, as noted in problem focused assessment noted above, that impact their occupational profile.  Resulting in few treatment options and no task modification consistent with low clinical decision making complexity.    Pain at End of Session:   Pain: 0/10    PLAN  Suggestions for next session as indicated:   OT Frequency: DC OT         Interventions: patient education  Agreement to plan and goals: patient agrees with goals and treatment plan      Documented in the chart in the following areas: Assessment/Plan.    Patient at End of Session:   Location: in chair  Safety measures: alarm system in place/re-engaged and lines intact  Patient handoff to: phlobotomist.      Therapy procedure time and total treatment  time can be found documented on the Time Entry flowsheet   no

## 2023-07-29 ENCOUNTER — EMERGENCY (EMERGENCY)
Facility: HOSPITAL | Age: 33
LOS: 1 days | Discharge: HOME | End: 2023-07-29
Attending: STUDENT IN AN ORGANIZED HEALTH CARE EDUCATION/TRAINING PROGRAM
Payer: MEDICAID

## 2023-07-29 VITALS
SYSTOLIC BLOOD PRESSURE: 118 MMHG | HEART RATE: 97 BPM | HEIGHT: 71 IN | OXYGEN SATURATION: 97 % | RESPIRATION RATE: 17 BRPM | WEIGHT: 309.97 LBS | DIASTOLIC BLOOD PRESSURE: 82 MMHG | TEMPERATURE: 98 F

## 2023-07-29 VITALS
SYSTOLIC BLOOD PRESSURE: 113 MMHG | DIASTOLIC BLOOD PRESSURE: 79 MMHG | HEART RATE: 89 BPM | TEMPERATURE: 99 F | RESPIRATION RATE: 18 BRPM | OXYGEN SATURATION: 95 %

## 2023-07-29 DIAGNOSIS — Z90.79 ACQUIRED ABSENCE OF OTHER GENITAL ORGAN(S): Chronic | ICD-10-CM

## 2023-07-29 PROCEDURE — 99283 EMERGENCY DEPT VISIT LOW MDM: CPT

## 2023-07-29 PROCEDURE — 99283 EMERGENCY DEPT VISIT LOW MDM: CPT | Mod: 25

## 2023-07-29 PROCEDURE — 96372 THER/PROPH/DIAG INJ SC/IM: CPT

## 2023-07-29 RX ORDER — IBUPROFEN 200 MG
1 TABLET ORAL
Qty: 40 | Refills: 0
Start: 2023-07-29 | End: 2023-08-07

## 2023-07-29 RX ORDER — KETOROLAC TROMETHAMINE 30 MG/ML
30 SYRINGE (ML) INJECTION ONCE
Refills: 0 | Status: DISCONTINUED | OUTPATIENT
Start: 2023-07-29 | End: 2023-07-29

## 2023-07-29 RX ADMIN — Medication 30 MILLIGRAM(S): at 21:13

## 2023-07-29 RX ADMIN — Medication 30 MILLIGRAM(S): at 20:43

## 2023-07-29 NOTE — ED PROVIDER NOTE - CLINICAL SUMMARY MEDICAL DECISION MAKING FREE TEXT BOX
33-year-old male presenting with testicular pain.  Patient well-known to this ED with similar complaints and often when staff to look at his genitals.  Patient requesting pain medications will provide analgesia.

## 2023-07-29 NOTE — ED PROVIDER NOTE - PHYSICAL EXAMINATION
General: well appearing male, no acute distress   HEENT: normocephalic, atraumatic   Respiratory: normal work of breathing   MSK: no swelling or tenderness of lower extremities, moving all extremities spontaneously   Skin: warm, dry   Neuro: A&Ox3  Psych: appropriate affect

## 2023-07-29 NOTE — ED PROVIDER NOTE - OBJECTIVE STATEMENT
33M, pmh of psych h/o moderate ID, ASD, impulse control disorder, factitious disorder, HAVEN, mood disorders, PTSD and ADHD, multiple past psych admissions, with acting out self injures behaviour, Presenting with testicular pain.  Patient reports he has testicular cancer again and has pain whenever he sits down.  Reports he took Tylenol at home without improvement.

## 2023-07-29 NOTE — ED PROVIDER NOTE - NSFOLLOWUPINSTRUCTIONS_ED_ALL_ED_FT
You were seen in the emergency department for testicular pain.    Please follow-up with your urologist.    If you have any worsening symptoms, severe testicular pain, abdominal pain, chest pain, trouble breathing, please return to the emergency department.

## 2023-07-29 NOTE — ED PROVIDER NOTE - PATIENT PORTAL LINK FT
You can access the FollowMyHealth Patient Portal offered by [facility] by registering at the following website: [email or web address]. By joining [employer]’s FollowMyHealth portal, you will also be able to view your health information using other applications (apps) compatible with our system.

## 2023-07-31 NOTE — ED PROVIDER NOTE - HIV OFFER
Previously Negative (within the last year) Mirvaso Counseling: Mirvaso is a topical medication which can decrease superficial blood flow where applied. Side effects are uncommon and include stinging, redness and allergic reactions.

## 2023-08-04 ENCOUNTER — APPOINTMENT (OUTPATIENT)
Dept: HEMATOLOGY ONCOLOGY | Facility: CLINIC | Age: 33
End: 2023-08-04
Payer: MEDICAID

## 2023-08-04 VITALS
WEIGHT: 300.03 LBS | HEART RATE: 88 BPM | BODY MASS INDEX: 43.05 KG/M2 | TEMPERATURE: 96.6 F | SYSTOLIC BLOOD PRESSURE: 119 MMHG | DIASTOLIC BLOOD PRESSURE: 82 MMHG | RESPIRATION RATE: 16 BRPM | OXYGEN SATURATION: 95 %

## 2023-08-04 PROCEDURE — 99213 OFFICE O/P EST LOW 20 MIN: CPT
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
## 2023-08-19 ENCOUNTER — EMERGENCY (EMERGENCY)
Facility: HOSPITAL | Age: 33
LOS: 1 days | Discharge: HOME | End: 2023-08-19
Attending: STUDENT IN AN ORGANIZED HEALTH CARE EDUCATION/TRAINING PROGRAM
Payer: MEDICAID

## 2023-08-19 ENCOUNTER — EMERGENCY (EMERGENCY)
Facility: HOSPITAL | Age: 33
LOS: 1 days | Discharge: HOME | End: 2023-08-19
Attending: EMERGENCY MEDICINE
Payer: MEDICAID

## 2023-08-19 VITALS
RESPIRATION RATE: 17 BRPM | DIASTOLIC BLOOD PRESSURE: 74 MMHG | OXYGEN SATURATION: 96 % | TEMPERATURE: 99 F | HEIGHT: 71 IN | HEART RATE: 80 BPM | WEIGHT: 291.01 LBS | SYSTOLIC BLOOD PRESSURE: 127 MMHG

## 2023-08-19 VITALS
WEIGHT: 291.01 LBS | OXYGEN SATURATION: 96 % | RESPIRATION RATE: 19 BRPM | HEIGHT: 71 IN | SYSTOLIC BLOOD PRESSURE: 110 MMHG | HEART RATE: 89 BPM | DIASTOLIC BLOOD PRESSURE: 78 MMHG | TEMPERATURE: 99 F

## 2023-08-19 DIAGNOSIS — Z90.79 ACQUIRED ABSENCE OF OTHER GENITAL ORGAN(S): Chronic | ICD-10-CM

## 2023-08-19 PROCEDURE — 99285 EMERGENCY DEPT VISIT HI MDM: CPT

## 2023-08-19 PROCEDURE — 99283 EMERGENCY DEPT VISIT LOW MDM: CPT

## 2023-08-19 PROCEDURE — 99284 EMERGENCY DEPT VISIT MOD MDM: CPT

## 2023-08-19 NOTE — ED ADULT TRIAGE NOTE - INTERNATIONAL TRAVEL
Outpatient Physical Therapy Peds Progress Note       Patient Name: Manuela Graves  : 2022  MRN: [id number]  Today's Date: 3/30/2022       Visit Date: 2022     There is no problem list on file for this patient.    No past medical history on file.  No past surgical history on file.    Visit Dx:    ICD-10-CM ICD-9-CM   1. Down syndrome  Q90.9 758.0   2. Hypotonia  M62.89 728.9   3. Gross motor delay  F82 315.4                                     OP Exercises     Row Name 22 1200             Subjective Comments    Subjective Comments Pt arrives with mother who states she has started to attempt to crawl on belly a little more  -AT              Total Minutes    80186 - PT Therapeutic Exercise Minutes 12  -AT      03520 -  PT Neuromuscular Reeducation Minutes 15  -AT      79731 - PT Therapeutic Activity Minutes 20  -AT              Exercise 1    Exercise Name 1 therex:  pull to sit, sit swiss ball with supine to sit, sitting to improve trunk control.  -AT              Exercise 2    Exercise Name 2 ther act:  prone, prone on elbows, assisted rolling, assisted prone with extended elbows, sitting balance activities, weight bearing activities, tall kneeling activities  -AT              Exercise 3    Exercise Name 3 neuro:  swiss ball activities sitting and prone to improve balance reactions and trunk control  -AT            User Key  (r) = Recorded By, (t) = Taken By, (c) = Cosigned By    Initials Name Provider Type    AT Nichelle Shipman, PT Physical Therapist                              PT OP Goals     Row Name 22 1200          PT Short Term Goals    STG 1 Pt's mother will be educated in gross motor skills play for strengthening and HEP  -AT     STG 1 Progress Met  -AT     STG 1 Progress Comments met 22  -AT     STG 2 Pt will be able to sit with trunk off legs x 5 seconds consistently  -AT     STG 2 Progress Ongoing  -AT     STG 2 Progress Comments improving, inconsistent and cont  hypotonia and hyperflexibility noted  -AT     STG 3 Pt will be able to accept weight on LE's consistently  -AT     STG 3 Progress Ongoing  -AT     STG 3 Progress Comments much improved, able to do briefly however flexes trunk or luís knees, however is not recoiling them as frequently and received stander at home  -AT     STG 4 Pt will initiate prone press ups on extended elbows with min assist  -AT     STG 4 Progress Met  -AT     STG 4 Progress Comments met 3/2/22  -AT            Long Term Goals    LTG 1 Mother will be independent with HEP for gross motor skills and play  -AT     LTG 1 Progress Ongoing  -AT     LTG 2 Pt will be able to sit unsupported  -AT     LTG 2 Progress Ongoing  -AT     LTG 2 Progress Comments cont to req assist, able in tripod briefly however inconsistent and cont to throw self posteriorly  -AT     LTG 3 Pt will be able to perform prone with extended elbows without assist and WB on palms for 5 seconds  -AT     LTG 3 Progress Met  -AT     LTG 3 Progress Comments met 3/30/22  -AT     LTG 4 Pt will demo improved protective reactions laterally  -AT     LTG 4 Progress Ongoing  -AT     LTG 4 Progress Comments cont delay  -AT     LTG 5 Pt will be able to initiate crawling on belly x 5 feet consistently for locomotion  -AT     LTG 5 Progress New  -AT            Time Calculation    PT Goal Re-Cert Due Date 04/29/22  -AT           User Key  (r) = Recorded By, (t) = Taken By, (c) = Cosigned By    Initials Name Provider Type    AT Nichelle Shipman, CARLENE Physical Therapist               PT Assessment/Plan     Row Name 03/30/22 1200          PT Assessment    Functional Limitations --  delayed gross motor skills  -AT     Impairments Balance;Coordination;Endurance;Gait;Impaired neuromotor development;Impaired muscle power;Locomotion;Muscle strength;Motor function;Posture  -AT     Assessment Comments Pt is a 12 month old child referred for gross motor delay due to downs syndrome as well as recent  heart surgery and hypotonia. She presents with overall hypotonia and decreased protective reactions as well as decreased trunk control, balance, coordination, inability to WB on LE's and delayed overall gross motor skills and hyperflexibility. Manuela will benefit from skilled PT services to address limitaitons and reach max functional level. Pt has met 2/4 STG and 1/5 LTGs.  She has made progress with sitting activities however inconsistent.  -AT     Rehab Potential Good  -AT     Patient/caregiver participated in establishment of treatment plan and goals Yes  -AT     Patient would benefit from skilled therapy intervention Yes  -AT            PT Plan    PT Frequency 1x/week  -AT     Predicted Duration of Therapy Intervention (PT) 12 weeks  -AT     Planned CPT's? PT EVAL MOD COMPLELITY: 09256;PT THER PROC EA 15 MIN: 77231;PT THER ACT EA 15 MIN: 67062;PT MANUAL THERAPY EA 15 MIN: 38412;PT NEUROMUSC RE-EDUCATION EA 15 MIN: 82175;PT GAIT TRAINING EA 15 MIN: 88165  -AT     Physical Therapy Interventions (Optional Details) balance training;bed mobility training;gait training;gross motor skills;home exercise program;motor coordination training;neuromuscular re-education;patient/family education;postural re-education;ROM (Range of Motion);stair training;manual therapy techniques;strengthening;stretching;swiss ball techniques;transfer training;taping  -AT     PT Plan Comments Pt will benefit from skilled PT services to improve gross motor skills, reach max f unctional level and to improve trunk strength and sitting balance.  -AT           User Key  (r) = Recorded By, (t) = Taken By, (c) = Cosigned By    Initials Name Provider Type    AT Nichelle Shipman, PT Physical Therapist                       Time Calculation:   Timed Charges  76387 - PT Therapeutic Exercise Minutes: 12  94990 -  PT Neuromuscular Reeducation Minutes: 15  49056 - PT Therapeutic Activity Minutes: 20  Total Minutes  Timed Charges Total Minutes:  47   Total Minutes: 47       Leela Sorenson MD  NPI: [id number]      Nichelle Shipman, PT   License number:  [id number]    Electronically signed by:             Nichelle Shipman, PT  3/30/2022      No

## 2023-08-19 NOTE — ED PROVIDER NOTE - NSFOLLOWUPINSTRUCTIONS_ED_ALL_ED_FT
You were seen in the emergency department for: chest pain  We recommend you follow up with: your primary care doctor.    Please return to the Emergency Department if you experience any of the following symptoms:   - Shortness of breath or trouble breathing  - Pressure, pain or tightness in the chest  - Face drooping, arm weakness or speech difficulty  - Persistence of severe vomiting  - Head injury or loss of consciousness  - Nonstop bleeding or an open wound    (1) Follow up with your primary care physician within the next 24-48 hours as discussed. In addition, we did not find evidence of a life threatening illness on your testing here today, but listed below are the specialists that will be necessary to see as an outpatient to continue the workup.  Please call the numbers listed below or [phone] to set up the necessary appointments.  (2) Take Tylenol (up to 1000mg or 1 g)  and/or Motrin (up to 600mg) up to every 6 hours as needed for pain.   (3) If you had an IV (intravenous) line placed, it was removed. Sometimes, after IV removal, that area can be tender for a few days; if it develops redness and swelling, those could be signs of infection; in which case, return to the Emergency Department for assessment.  (4) Please continue taking all of your home medications as directed.

## 2023-08-19 NOTE — ED PROVIDER NOTE - PATIENT PORTAL LINK FT
You can access the FollowMyHealth Patient Portal offered by [facility] by registering at the following website: [email or web address]. By joining [employer]’s FollowMyHealth portal, you will also be able to view your health information using other applications (apps) compatible with our system.

## 2023-08-19 NOTE — ED ADULT NURSE NOTE - OBJECTIVE STATEMENT
Pt presents to ED AAOx4, patient reports chest pain today. Denies radiation of pain. Denies N/V/D, fever, chills, chest pain or headache.

## 2023-08-19 NOTE — ED PROVIDER NOTE - OBJECTIVE STATEMENT
33-year-old male  hx of moderate ID, ASD, impulse control disorder, factitious disorder, HAVEN, mood disorders, PTSD and ADHD, multiple past psych admissions, self cutting behavior, frequent ER visits for chest pain/testicular pain, here again for same. Patient is comfortable. Says that this pain is chronic and ongoing, has not changed. Denies any new symptoms,

## 2023-08-19 NOTE — ED PROVIDER NOTE - CHIEF COMPLAINT
Detail Level: Zone Samples Given: Eucerin spot treatment and Eucerin Roughness Relief Treatment twice daily (OTC), Amlactin and gold bond rough and bumpy The patient is a 33y Male complaining of chest pain.

## 2023-08-19 NOTE — ED ADULT NURSE NOTE - NSFALLUNIVINTERV_ED_ALL_ED
Bed/Stretcher in lowest position, wheels locked, appropriate side rails in place/Call bell, personal items and telephone in reach/Instruct patient to call for assistance before getting out of bed/chair/stretcher/Non-slip footwear applied when patient is off stretcher/[place] to call system/Physically safe environment - no spills, clutter or unnecessary equipment/Purposeful proactive rounding/Room/bathroom lighting operational, light cord in reach

## 2023-08-19 NOTE — ED PROVIDER NOTE - CLINICAL SUMMARY MEDICAL DECISION MAKING FREE TEXT BOX
33-year-old male  hx of moderate ID, ASD, impulse control disorder, factitious disorder, HAVEN, mood disorders, PTSD and ADHD, multiple past psych admissions, self cutting behavior, frequent ER visits for chest pain/testicular pain, here again for same. ECG nonischemic. Patient comfortable, exam and vitals normal. No indication for further workup, will discharge.

## 2023-08-19 NOTE — ED PROVIDER NOTE - EKG #1 DATE/TIME
[address] [facility] Note     Patient Name: Aristeo Franz  Encounter Date: 04/20/22       Have you been cared for by a [facility] Dermatologist in the last 3 years and, if so, which one? No    · Have you traveled outside of the [address] [place] [address] in the past 3 months or outside of the [facility] area in the last 2 weeks? No     May we call your Preferred Phone number to discuss your specific medical information? Yes     May we leave a detailed message that includes your specific medical information? Yes      Today's Chief Concerns:   Concern #1:  Scalp itchy    Concern #2:      Past Medical History:  Have you personally ever had or currently have any of the following? · Skin cancer (such as Melanoma, Basal Cell Carcinoma, Squamous Cell Carcinoma? (If Yes, please provide more detail)- No  · Eczema: No  · Psoriasis: No  · HIV/AIDS: No  · Hepatitis B or C: No  · Tuberculosis: No  · Systemic Immunosuppression such as Diabetes, Biologic or Immunotherapy, Chemotherapy, Organ Transplantation, Bone Marrow Transplantation (If YES, please provide more detail): No  · Radiation Treatment (If YES, please provide more detail): No  · Any other major medical conditions/concerns? 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 Outdoor, soccer     Family History:  Have any of your "first degree relatives" (parent, brother, sister, or child) had any of the following       · Skin cancer such as Melanoma or Merkel Cell Carcinoma or Pancreatic Cancer? No  · Eczema, Asthma, Hay Fever or Seasonal Allergies: No  · Psoriasis or Psoriatic Arthritis: No  · Do any other medical conditions seem to run in your family? If Yes, what condition and which relatives?   No    Current Medications:         Current Outpatient Medications:     benzonatate (TESSALON PERLES) 100 mg capsule, Take 1 capsule (100 mg total) by mouth 3 (three) times a day as needed for cough, Disp: 20 capsule, Rfl: 0    desonide (DESOWEN) 0 05 % cream, APPLY TOPICALLY TO THE AFFECTED AREA EVERY OTHER DAY, Disp: 30 g, Rfl: 0    dextromethorphan-guaifenesin (MUCINEX DM)  MG per 12 hr tablet, Take 1 tablet by mouth every 12 (twelve) hours, Disp: 60 tablet, Rfl: 0    fluticasone (FLONASE) 50 mcg/act nasal spray, 1 spray into each nostril daily, Disp: 18 2 mL, Rfl: 0    loratadine (CLARITIN) 10 mg tablet, Take 1 tablet (10 mg total) by mouth daily, Disp: 40 tablet, Rfl: 0      Review of Systems:  Have you recently had or currently have any of the following? If YES, what are you doing for the problem? · Fever, chills or unintended weight loss: No  · Sudden loss or change in your vision: No  · Nausea, vomiting or blood in your stool: No  · Painful or swollen joints: No  · Wheezing or cough: No  · Changing mole or non-healing wound: No  · Nosebleeds: No  · Excessive sweating: No  · Easy or prolonged bleeding? No  · Over the last 2 weeks, how often have you been bothered by the following problems? · Taking little interest or pleasure in doing things: 1 - Not at All  · Feeling down, depressed, or hopeless: 1 - Not at All  · Rapid heartbeat with epinephrine:  No    · FEMALES ONLY:    · Are you pregnant or planning to become pregnant? N/A  · Are you currently or planning to be nursing or breast feeding? N/A    · Any known allergies? Allergies   Allergen Reactions    Other      Seasonal-spring/summer"   ·       Physical Exam:     Was a chaperone (Derm Clinical Assistant) present throughout the entire Physical Exam? Yes     Did the Dermatology Team specifically  the patient on the importance of a Full Skin Exam to be sure that nothing is missed clinically?  Yes}  o Did the patient ultimately request or accept a Full Skin Exam?  NO      CONSTITUTIONAL:   Vitals:    04/20/22 0803   Weight: 86 6 kg (191 lb)       PSYCH: Normal mood and affect  EYES: Normal conjunctiva  ENT: Normal lips and oral mucosa  CARDIOVASCULAR: No edema  RESPIRATORY: Normal respirations  HEME/LYMPH/IMMUNO:  No regional lymphadenopathy except as noted below in "ASSESSMENT AND PLAN BY DIAGNOSIS"    SKIN:  FULL ORGAN SYSTEM EXAM   Hair, Scalp, Ears, Face Normal except as noted below in Assessment        Assessment and Plan by Diagnosis:    History of Present Condition:     Duration:  How long has this been an issue for you?    o  2 years   Location Affected:  Where on the body is this affecting you? o  scalp   Quality:  Is there any bleeding, pain, itch, burning/irritation, or redness associated with the skin lesion? o  itching, irritation, redness   Severity:  Describe any bleeding, pain, itch, burning/irritation, or redness on a scale of 1 to 10 (with 10 being the worst)  o  6   Timing:  Does this condition seem to be there pretty constantly or do you notice it more at specific times throughout the day? o  comes and goes   Context:  Have you ever noticed that this condition seems to be associated with specific activities you do?    o  unknown   Modifying Factors:    o Anything that seems to make the condition worse?    -  letting hair grow  o What have you tried to do to make the condition better? -  Desonide    Associated Signs and Symptoms:  Does this skin lesion seem to be associated with any of the following:  o  SL AMB DERM SIGNS AND SYMPTOMS: Redness and Itching and Scratching     PSEUDOFOLLICULITIS BARBAE    Physical Exam:   Anatomic Location Affected:  Vertex scalp   Morphological Description:  Small pink papules   Pertinent Positives:   Pertinent Negatives: Additional History of Present Condition:  Patient reports the same spot becomes itchy sometimes  Denies rash anywhere else  He does have some beard involvement but that does not bother him      Assessment and Plan:  Based on a thorough discussion of this condition and the management approach to it (including a comprehensive discussion of the known risks, side effects and potential benefits of treatment), the patient (family) agrees to implement the following specific plan:   Start applying clindamycin 1% lotion twice a day to scalp and to beard area   Injected intralesional kenalog today   Continue applying desonide twice a day   Follow up in 4-6 weeks for repeat injection if needed    PROCEDURE:  INTRALESIONAL STEROID INJECTION (KENALOG INJECTION)    Purpose: Triamcinolone is a synthetic glucocorticoid corticosteroid that has marked anti-inflammatory action  It is prepared in sterile aqueous suspension suitable for injecting directly into a lesion on or immediately below the skin to treat a dermal inflammatory process  Indications: It is indicated for alopecia areata; inflammatory acne cysts; discoid lupus erythematosus; keloids and hypertrophic scars; inflammatory lesions of granuloma annulare, lichen planus, lichen simplex chronicus (neurodermatitis), psoriatic plaques, and other localized inflammatory skin conditions  Potential Side Effects: I understand that triamcinolone injection can potentially cause early and/or delayed adverse effects such as:    Pain    Impaired wound healing    Increased hair growth    Bleeding    White or brown marks    Steroid acne    Infection    Telangiectasia    Skin thinning    Cutaneous and subcutaneous lipoatrophy (most common) appearing as skin indentations or dimples around the injection sites a few weeks after treatment     PROCEDURE NOTE:  After verbal and written consent were obtained, the to-be-treated area was wiped and cleaned with rubbing alcohol 70%  Then, a total of 0 2 mL of Kenalog CONCENTRATION:  10 mg/mL   (Lot# [id number]; Expiration March 2023, NDC#: 7274-6500-94) was injected intralesionally into a total of 1 lesion/s on the following anatomic areas:  Vertex scalp using a 1-mL syringe and a 30 5-gauge needle  There was less than 1 mL of blood loss and little to no discomfort  The area was bandaged with a Band-aid  The patient tolerated the procedure well and remained in the office for observation  With no signs of an adverse reaction, the patient was eventually discharged from clinic  19-Aug-2023 18:46

## 2023-08-20 VITALS
TEMPERATURE: 98 F | OXYGEN SATURATION: 97 % | DIASTOLIC BLOOD PRESSURE: 75 MMHG | SYSTOLIC BLOOD PRESSURE: 115 MMHG | RESPIRATION RATE: 20 BRPM | HEART RATE: 88 BPM

## 2023-08-20 LAB
ALBUMIN SERPL ELPH-MCNC: 3.3 G/DL — LOW (ref 3.5–5)
ALP SERPL-CCNC: 105 U/L — SIGNIFICANT CHANGE UP (ref 40–120)
ALT FLD-CCNC: 64 U/L DA — HIGH (ref 10–60)
AMPHET UR-MCNC: NEGATIVE — SIGNIFICANT CHANGE UP
ANION GAP SERPL CALC-SCNC: 8 MMOL/L — SIGNIFICANT CHANGE UP (ref 5–17)
APAP SERPL-MCNC: <2 UG/ML — LOW (ref 10–30)
AST SERPL-CCNC: 19 U/L — SIGNIFICANT CHANGE UP (ref 10–40)
BARBITURATES UR SCN-MCNC: NEGATIVE — SIGNIFICANT CHANGE UP
BASOPHILS # BLD AUTO: 0.03 K/UL — SIGNIFICANT CHANGE UP (ref 0–0.2)
BASOPHILS NFR BLD AUTO: 0.3 % — SIGNIFICANT CHANGE UP (ref 0–2)
BENZODIAZ UR-MCNC: NEGATIVE — SIGNIFICANT CHANGE UP
BILIRUB SERPL-MCNC: 0.2 MG/DL — SIGNIFICANT CHANGE UP (ref 0.2–1.2)
BUN SERPL-MCNC: 13 MG/DL — SIGNIFICANT CHANGE UP (ref 7–18)
CALCIUM SERPL-MCNC: 8.7 MG/DL — SIGNIFICANT CHANGE UP (ref 8.4–10.5)
CHLORIDE SERPL-SCNC: 107 MMOL/L — SIGNIFICANT CHANGE UP (ref 96–108)
CO2 SERPL-SCNC: 25 MMOL/L — SIGNIFICANT CHANGE UP (ref 22–31)
COCAINE METAB.OTHER UR-MCNC: NEGATIVE — SIGNIFICANT CHANGE UP
CREAT SERPL-MCNC: 0.9 MG/DL — SIGNIFICANT CHANGE UP (ref 0.5–1.3)
EGFR: 115 ML/MIN/1.73M2 — SIGNIFICANT CHANGE UP
EOSINOPHIL # BLD AUTO: 0.4 K/UL — SIGNIFICANT CHANGE UP (ref 0–0.5)
EOSINOPHIL NFR BLD AUTO: 4.5 % — SIGNIFICANT CHANGE UP (ref 0–6)
ETHANOL SERPL-MCNC: 7 MG/DL — SIGNIFICANT CHANGE UP (ref 0–10)
GLUCOSE SERPL-MCNC: 124 MG/DL — HIGH (ref 70–99)
HCT VFR BLD CALC: 40 % — SIGNIFICANT CHANGE UP (ref 39–50)
HGB BLD-MCNC: 12.7 G/DL — LOW (ref 13–17)
IMM GRANULOCYTES NFR BLD AUTO: 0.8 % — SIGNIFICANT CHANGE UP (ref 0–0.9)
LYMPHOCYTES # BLD AUTO: 3.21 K/UL — SIGNIFICANT CHANGE UP (ref 1–3.3)
LYMPHOCYTES # BLD AUTO: 36.4 % — SIGNIFICANT CHANGE UP (ref 13–44)
MCHC RBC-ENTMCNC: 25.4 PG — LOW (ref 27–34)
MCHC RBC-ENTMCNC: 31.8 GM/DL — LOW (ref 32–36)
MCV RBC AUTO: 80 FL — SIGNIFICANT CHANGE UP (ref 80–100)
METHADONE UR-MCNC: NEGATIVE — SIGNIFICANT CHANGE UP
MONOCYTES # BLD AUTO: 0.56 K/UL — SIGNIFICANT CHANGE UP (ref 0–0.9)
MONOCYTES NFR BLD AUTO: 6.3 % — SIGNIFICANT CHANGE UP (ref 2–14)
NEUTROPHILS # BLD AUTO: 4.56 K/UL — SIGNIFICANT CHANGE UP (ref 1.8–7.4)
NEUTROPHILS NFR BLD AUTO: 51.7 % — SIGNIFICANT CHANGE UP (ref 43–77)
NRBC # BLD: 0 /100 WBCS — SIGNIFICANT CHANGE UP (ref 0–0)
OPIATES UR-MCNC: NEGATIVE — SIGNIFICANT CHANGE UP
PCP SPEC-MCNC: SIGNIFICANT CHANGE UP
PCP UR-MCNC: NEGATIVE — SIGNIFICANT CHANGE UP
PLATELET # BLD AUTO: 221 K/UL — SIGNIFICANT CHANGE UP (ref 150–400)
POTASSIUM SERPL-MCNC: 4.3 MMOL/L — SIGNIFICANT CHANGE UP (ref 3.5–5.3)
POTASSIUM SERPL-SCNC: 4.3 MMOL/L — SIGNIFICANT CHANGE UP (ref 3.5–5.3)
PROT SERPL-MCNC: 6.5 G/DL — SIGNIFICANT CHANGE UP (ref 6–8.3)
RBC # BLD: 5 M/UL — SIGNIFICANT CHANGE UP (ref 4.2–5.8)
RBC # FLD: 13.5 % — SIGNIFICANT CHANGE UP (ref 10.3–14.5)
SALICYLATES SERPL-MCNC: <1.7 MG/DL — LOW (ref 2.8–20)
SARS-COV-2 RNA SPEC QL NAA+PROBE: SIGNIFICANT CHANGE UP
SODIUM SERPL-SCNC: 140 MMOL/L — SIGNIFICANT CHANGE UP (ref 135–145)
THC UR QL: NEGATIVE — SIGNIFICANT CHANGE UP
TROPONIN I, HIGH SENSITIVITY RESULT: 4.4 NG/L — SIGNIFICANT CHANGE UP
WBC # BLD: 8.83 K/UL — SIGNIFICANT CHANGE UP (ref 3.8–10.5)
WBC # FLD AUTO: 8.83 K/UL — SIGNIFICANT CHANGE UP (ref 3.8–10.5)

## 2023-08-20 PROCEDURE — 84484 ASSAY OF TROPONIN QUANT: CPT

## 2023-08-20 PROCEDURE — 90792 PSYCH DIAG EVAL W/MED SRVCS: CPT | Mod: 95

## 2023-08-20 PROCEDURE — 80307 DRUG TEST PRSMV CHEM ANLYZR: CPT

## 2023-08-20 PROCEDURE — 93005 ELECTROCARDIOGRAM TRACING: CPT

## 2023-08-20 PROCEDURE — 36415 COLL VENOUS BLD VENIPUNCTURE: CPT

## 2023-08-20 PROCEDURE — 87635 SARS-COV-2 COVID-19 AMP PRB: CPT

## 2023-08-20 PROCEDURE — 80053 COMPREHEN METABOLIC PANEL: CPT

## 2023-08-20 PROCEDURE — 99285 EMERGENCY DEPT VISIT HI MDM: CPT

## 2023-08-20 PROCEDURE — 85025 COMPLETE CBC W/AUTO DIFF WBC: CPT

## 2023-08-20 NOTE — ED PROVIDER NOTE - OBJECTIVE STATEMENT
33 male with hx of ASD, impulse control disorder, factitious disorder, generalized anxiety disorder, PTSD, ADHD, & moderate intellectual disability.  Multiple prior psych admissions..   Pt presenting to the ED reporting self-harm thoughts from group home.  Patient reports stabbing himself in abdomen with a "piece of plastic".  Aide at bedside reports that he has a chronic wound to his abdomen and that he just picks at it.

## 2023-08-20 NOTE — ED BEHAVIORAL HEALTH ASSESSMENT NOTE - DETAILS
see  Chart Note by SW Per chart has had a rash in response to Zyprexa see HPI after hours [facility]- 7/13-21/2023 Per chart has prior trauma(details unknown) see Safety Plan in chart

## 2023-08-20 NOTE — ED BEHAVIORAL HEALTH ASSESSMENT NOTE - NSBHMSESPEECH_PSY_A_CORE
elevated biomarkers a cardiomyopathy
weakness
Normal volume, rate, productivity, spontaneity and articulation

## 2023-08-20 NOTE — ED BEHAVIORAL HEALTH ASSESSMENT NOTE - OTHER
chronically poor concrete Group Home easily distracted reported SI upon arrival to ED, currently denies SI [employer]

## 2023-08-20 NOTE — ED BEHAVIORAL HEALTH ASSESSMENT NOTE - DESCRIPTION
See hpi calm and cooperative    Vital Signs Last 24 Hrs  T(C): 37 (19 Aug 2023 23:36), Max: 37 (19 Aug 2023 18:35)  T(F): 98.6 (19 Aug 2023 23:36), Max: 98.6 (19 Aug 2023 18:35)  HR: 89 (19 Aug 2023 23:36) (80 - 89)  BP: 110/78 (19 Aug 2023 23:36) (110/78 - 127/74)  BP(mean): --  RR: 19 (19 Aug 2023 23:36) (17 - 19)  SpO2: 96% (19 Aug 2023 23:36) (96% - 96%)    Parameters below as of 19 Aug 2023 23:36  Patient On (Oxygen Delivery Method): room air

## 2023-08-20 NOTE — ED PROVIDER NOTE - NSFOLLOWUPCLINICS_GEN_ALL_ED_FT
[facility]  Internal Medicine  [address] [place] [address]  Phone: [phone]  Fax: [phone]  Follow Up Time: 1-3 Days    A Psychiatrist  Psychiatry  .  [place]   Phone:   Fax:   Follow Up Time: 1-3 Days    [facility]  Psychiatry  [address] [place] [address]  Phone: [phone]  Fax:   Follow Up Time: 1-3 Days

## 2023-08-20 NOTE — ED BEHAVIORAL HEALTH ASSESSMENT NOTE - HPI (INCLUDE ILLNESS QUALITY, SEVERITY, DURATION, TIMING, CONTEXT, MODIFYING FACTORS, ASSOCIATED SIGNS AND SYMPTOMS)
33 year old male, single, disabled, non-caregiver, resides at a [facility], with PMHx of asthma, DM, urinary retention, GERD, BPH, hypothyroidism, HLD, HTN, and b/l myopia, and PPHx of moderate ID, ASD, impulse control disorder, factitious disorder, HAVEN, mood disorders, PTSD and ADHD, hx of multiple past psych admissions (last known [facility] 7/13-21/2023), numerous ED visits for both medical/psych complaints, longstanding h/o SIB (head banging, cutting), but no known SA, longstanding h/o endorsing SI, h/o aggression toward staff at , on Depakote 500 mg qAM & 1000mg qPM, Guanfacine 2 mg, Risperdal 3 mg BID, and Remeron 30 mg, prescribed by his PCP, who presents to the ED for SI and reporting that he stabbed himself in the abdomen with a piece of plastic (of note patient presented with a similar presentation in 7/24/23).    Of note, patient has a concrete thought process. Patient reports that he had SI and stabbed himself in the abdomen with a piece of plastic; no wounds were seen/noted by ED provider. Patient reports that he had SI and "stabbed himself" because "I'm just not happy." He reports he is not happy because he doesn't like where he lives. When asked what he does not like about his current residence, patient states "I don't know. I just don't like it." Patient reports that his overall mood before today is good. He reports good sleep, appetite, and energy. When asked if patient is still having SI and thoughts of hurting himself, patient said "No" Patient denies any SI/I/P. When asked about coping skills and what patient can do to make himself feel better when he is upset, patient stated listening to music and sometimes taking walks. When asked what patient would do if discharged back to group home, patient states "I don't know," and when asked if he would go to sleep as he looked tired, patient stated "yeah, probably." Patient denied any hypomanic/manic or psychotic symptoms. Patient reports some "bladder pain" in lower abdomen due to not urinating (of note, spoke with ED provider later who reported that patient had urinated in ED). Patient denies any pain anywhere else. Patient denies any safety concerns in being discharged. Patient reports he feels comfortable going to  staff if he has thoughts of SI or hurting himself again.    LMSW Jessica Phillips spoke with [facility], who confirmed that this is patient's baseline and that he often will go to ED with similar complaints; denies any safety concerns with patient being discharged back to [facility]. 33 year old male, single, disabled, non-caregiver, resides at a [facility], with PMHx of asthma, DM, urinary retention, GERD, BPH, hypothyroidism, HLD, HTN, and b/l myopia, and PPHx of moderate ID, ASD, impulse control disorder, factitious disorder, HAVEN, mood disorders, PTSD and ADHD, hx of multiple past psych admissions (last known [facility] 7/13-21/2023), numerous ED visits for both medical/psych complaints, longstanding h/o SIB (head banging, cutting), but no known SA, longstanding h/o endorsing SI, h/o aggression toward staff at , on Depakote 500 mg qAM & 1000mg qPM, Guanfacine 2 mg, Risperdal 3 mg BID, and Remeron 30 mg, prescribed by his PCP, who presents to the ED for SI and reporting that he stabbed himself in the abdomen with a piece of plastic (of note patient presented with a similar presentation in 7/24/23).    Of note, patient has a concrete thought process. Patient reports that he had SI and stabbed himself in the abdomen with a piece of plastic; no wounds were seen/noted by ED provider. Patient reports that he had SI and "stabbed himself" because "I'm just not happy." He reports he is not happy because he doesn't like where he lives. When asked what he does not like about his current residence, patient states "I don't know. I just don't like it." Patient reports that his overall mood before today is good. He reports good sleep, appetite, and energy. When asked if patient is still having SI and thoughts of hurting himself, patient said "No" Patient denies any SI/I/P. When asked about coping skills and what patient can do to make himself feel better when he is upset, patient stated listening to music and sometimes taking walks. When asked what patient would do if discharged back to group home, patient states "I don't know," and when asked if he would go to sleep as he looked tired, patient stated "yeah, probably." Patient denied any hypomanic/manic or psychotic symptoms. Patient reports some "bladder pain" in lower abdomen due to not urinating (of note, spoke with ED provider later who reported that patient had urinated in ED). Patient denies any pain anywhere else. Patient denies any safety concerns in being discharged. Patient reports he feels comfortable going to  staff if he has thoughts of SI or hurting himself again.    SW Jessica Phillips spoke with [facility]: "Patient is a 33M domiciled at [facility], single without children/non-caregiver, PPHx/o ASD, ID, HAVEN, mood d/o, impulse control d/o, and ADHD, has had multiple IPP admissions (most recently in July 2023), PMHx/o asthma, DM, GERD, urinary retention, HLD, HTN, no substance/Etoh use hx, no access to firearms, no known legal hx.     Chiquita stated she was not present at time of decompensation and is unaware of the events that took place that led to the patient coming to the ED. Chiquita stated patient has come to the ED mutliple times for psych eval for superficial cutting, states patient usually cuts with anything that he can obtain in the facility. Chiquita states patient has been at baseline, denies patient has endorsed SI/HI/VH recently, states patient has not been combative or aggressive. Chiquita states patient may return to the facility if psychiatrically cleared."

## 2023-08-20 NOTE — ED BEHAVIORAL HEALTH NOTE - BEHAVIORAL HEALTH NOTE
==================  PRE-HOSPITAL COURSE  ===================  SOURCE:  RN and secondhand ED documentation  DETAILS:  Per chart, patient was BIB group home aide for stating that he had tried to stab himself, and endorses SI.      ============  ED COURSE  =========  SOURCE:  RN and secondhand ED documentation.  ARRIVAL:  Per chart and RN, patient arrived as a walk-in.  BELONGINGS: No belongings of note. All belongings were provided to hospital security, and patient currently in a gown with a 1:1 staff member.  BEHAVIOR: RN described patient to be currently calm and cooperative, presenting with linear thought process, is fully AAOx3, presenting with stable mood and appropriate affect, remains in good behavioral and impulse control, is not currently violent/aggressive. RN stated that the patient is not actively endorsing. RN stated that there are some skin pickings on patient's skin that are superficial, denies that the patient presents with any stab wounds. RN stated that the patient appears to be fairly-groomed.  TREATMENT:  Per chart and RN, patient did not require  VISITORS:  Per RN, patient's group home aide is at bedside, remains appropriate in the ED.

## 2023-08-20 NOTE — ED BEHAVIORAL HEALTH NOTE - BEHAVIORAL HEALTH NOTE
==================  PRE-HOSPITAL COURSE  ===================  SOURCE:  RN  and secondhand ED documentation  DETAILS:  Per chart, patient states patient was brought in for evaluation for attempting to self-harm with a plastic knife.      ============  ED COURSE  =========  SOURCE:  RN and secondhand ED documentation.  ARRIVAL:  Per chart and RN, patient arrived via group home escort, was noted to be calm upon arrival.   BELONGINGS: No belongings of note. All belongings were provided to hospital security, and patient currently in a gown with a 1:1 staff member.  BEHAVIOR: RN described patient to be currently calm and cooperative, presenting with linear thought process, is fully AAOx3, presenting with stable mood and appropriate affect, remains in good behavioral and impulse control, is good violent/aggressive. RN stated that the patient is denying current SI/HI/A/VH. RN stated that there are skin pickings that are superficial noticed on the patient. RN stated that the patient appears to be fairly-groomed.  TREATMENT:  Per chart and RN, patient (did/did not) PRN medications.   VISITORS:  Per RN, patient had a [facility] staff member at bedside earlier however they are no longer there. RN states patient is currently accompanied by 1:1.       ================  FOR EACH COLLATERAL   ================  NAME: [facility]   NUMBER: [phone]  COMMENTS: [facility] attempted to contact the patient's residence however they were unavailable. [facility] left a v/m requesting a call back.     ================  FOR EACH COLLATERAL   ================  NAME: Chiquita   NUMBER: [phone]  RELATIONSHIP: [facility]  RELIABILITY: Limited but reliable  Patient gives permission to obtain collateral from group home:  ( x ) Yes  (  )  No  Rationale for overriding objection            (  ) Lack of capacity. Details: ________            (  ) Assessing risk of danger to self/others. Details: ________  Rationale for selecting specific collateral source            (  ) Potential to impact risk of danger to self/others and no alternative equivalent. Details:  Collateral has requested that the information provided remain confidential: Yes [  ] No [ x ]  Collateral has provided information that patient is/may be unaware of: Yes [  ] No [ x ]    Patient is a 33M domiciled at [facility], single without children/non-caregiver, PPHx/o ASD, ID, HAVEN, mood d/o, impulse control d/o, and ADHD, has had multiple IPP admissions (most recently in July 2023), PMHx/o asthma, DM, GERD, urinary retention, HLD, HTN, no substance/Etoh use hx, no access to firearms, no known legal hx.     Chiquita stated she was not present at time of decompensation and is unaware of the events that took place that led to the patient coming to the ED. Chiquita stated patient has come to the ED mutliple times for psych eval for superficial cutting, states patient usually cuts with anything that he can obtain in the facility. Chiquita states patient has been at baseline, denies patient has endorsed SI/HI/VH recently, states patient has not been combative or aggressive. Chiquita states patient may return to the facility if psychiatrically cleared.

## 2023-08-20 NOTE — ED PROVIDER NOTE - CLINICAL SUMMARY MEDICAL DECISION MAKING FREE TEXT BOX
33 male with impulse control disorder, factitious disorder, moderate/disability, & prior psychiatric admissions. Pt presenting to the ED with reports of self-harm attempt & thoughts.  Wound noted to abdomen appears to be picked at skin lesion.  No actual laceration or stab wound.  Group home aide at bedside reports that patient does not have access to any items to hurt himself.    Vitals stable.    Nontoxic appearing, n/v intact.  Airway intact, no respiratory distress, no hypoxia.  No abdominal or CVA tenderness.  Non-focal neuro    Plan to obtain:    -Labs, 1: 1 observation for safety, sedation as needed for safety, psychiatry consult, observe/reassess    Prior ED note reviewed.  Patient seen in ED 1 hour prior reporting chest pain. 33 male with impulse control disorder, factitious disorder, moderate/disability, & prior psychiatric admissions. Pt presenting to the ED with reports of self-harm attempt & thoughts.  Wound noted to abdomen appears to be picked at skin lesion.  No actual laceration or stab wound.  Group home aide at bedside reports that patient does not have access to any items to hurt himself.    Vitals stable.    Nontoxic appearing, n/v intact.  Airway intact, no respiratory distress, no hypoxia.  No abdominal or CVA tenderness.  Non-focal neuro    Plan to obtain:    -Labs, 1: 1 observation for safety, sedation as needed for safety, psychiatry consult, observe/reassess    Prior ED note reviewed.  Patient seen in ED 1 hour prior reporting chest pain.    Lab values demonstrate no acute/emergent pathology    Care d/w Psych and no acute interventions at this time.  No indication for inpatient psychiatric admission at this time.    Patient stable for further care in outpatient setting. No indication for inpatient admission at this time. Patient advised regarding symptomatic & supportive care and symptoms to prompt ED return.

## 2023-08-20 NOTE — ED ADULT NURSE REASSESSMENT NOTE - NS ED NURSE REASSESS COMMENT FT1
Patient re-evaluated by Dr. Bess with health teaching and instructions rendered. Patient is discharge ins table condition and accompanied with the group home staff.

## 2023-08-20 NOTE — ED PROVIDER NOTE - PROGRESS NOTE DETAILS
Dr Leos Care discussed with Dr Leos psychiatry. No indication for acute inpatient psychiatric admission at this time.  Recommending close outpatient follow-up.    Results reviewed.   Pt reports feeling better.   Tolerating PO intake.  Patient able to urinate in ED without difficulty.  Pt advised regarding symptomatic/supportive care, importance of PMD/psychiatry follow up, and symptoms to prompt ED return.

## 2023-08-20 NOTE — ED PROVIDER NOTE - PATIENT PORTAL LINK FT
You can access the FollowMyHealth Patient Portal offered by [facility] by registering at the following website: [email or web address]. By joining [facility]’s FollowMyHealth portal, you will also be able to view your health information using other applications (apps) compatible with our system.

## 2023-08-20 NOTE — ED BEHAVIORAL HEALTH ASSESSMENT NOTE - SUMMARY
33 year old male, single, disabled, non-caregiver, resides at a [facility], with PMHx of asthma, DM, urinary retention, GERD, BPH, hypothyroidism, HLD, HTN, and b/l myopia, and PPHx of moderate ID, ASD, impulse control disorder, factitious disorder, HAVEN, mood disorders, PTSD and ADHD, hx of multiple past psych admissions (last known [facility] 7/13-21/2023), numerous ED visits for both medical/psych complaints, longstanding h/o SIB (head banging, cutting), but no known SA, longstanding h/o endorsing SI, h/o aggression toward staff at , on Depakote 500 mg qAM & 1000mg qPM, Guanfacine 2 mg, Risperdal 3 mg BID, and Remeron 30 mg, prescribed by his PCP, who presents to the ED for SI and reporting that he stabbed himself in the abdomen with a piece of plastic (of note patient presented with a similar presentation in 7/24/23).    Patient reports stabbing self in abdomen, no wound seen by ED provider during physical examination. Patient reports experiencing SI and "stabbing" self due to not being happy living in group home; could not provide any further details as to why he was not happy in group home. He denies any suicidal ideation at time of psychiatric interview and denies any persistent depressive symptoms. Patient has  hx of similar ED presentations. Reviewed coping skills and formal safety planning performed. LMSW spoke with , who confirms that this is patient's baseline and that they do not have any safety concerns in patient being discharged back to long term. 33 year old male, single, disabled, non-caregiver, resides at a [facility], with PMHx of asthma, DM, urinary retention, GERD, BPH, hypothyroidism, HLD, HTN, and b/l myopia, and PPHx of moderate ID, ASD, impulse control disorder, factitious disorder, HAVEN, mood disorders, PTSD and ADHD, hx of multiple past psych admissions (last known [facility] 7/13-21/2023), numerous ED visits for both medical/psych complaints, longstanding h/o SIB (head banging, cutting), but no known SA, longstanding h/o endorsing SI, h/o aggression toward staff at , on Depakote 500 mg qAM & 1000mg qPM, Guanfacine 2 mg, Risperdal 3 mg BID, and Remeron 30 mg, prescribed by his PCP, who presents to the ED for SI and reporting that he stabbed himself in the abdomen with a piece of plastic (of note patient presented with a similar presentation in 7/24/23).    Patient reports stabbing self in abdomen, no wound seen by ED provider during physical examination. Patient reports experiencing SI and "stabbing" self due to not being happy living in group home; could not provide any further details as to why he was not happy in group home. He denies any suicidal ideation at time of psychiatric interview and denies any persistent depressive symptoms. Patient has  hx of similar ED presentations. Reviewed coping skills and formal safety planning performed. LMSW spoke with , who confirms that this is patient's baseline and has hx of similar ED presentations; they do not have any safety concerns in patient returning to group home.

## 2023-08-20 NOTE — ED PROVIDER NOTE - PHYSICAL EXAMINATION
Gen:  Awake, alert, NAD, WDWN, NCAT, non-toxic appearing.  Eyes:  PERRL, EOMI, no icterus, normal lids/lashes, normal conjunctivae.  ENT:  External inspection normal, pink/moist membranes.   CV:  S1S2, regular rate and rhythm, no murmur/gallops/rubs, no JVD, 2+ pulses b/l, no edema/cords/homans, warm/well-perfused.  Resp:  Normal respiratory rate/effort, no respiratory distress, normal voice, speaking full sentences, lungs clear to auscultation b/l, no wheezing/rales/rhonchi, no retractions, no stridor.  Abd:  Soft abdomen, no tender/distended/guarding/rebound, no pulsatile mass, no CVA tender.   Musculoskeletal:  N/V intact, FROM all 4 extremities, normal motor tone, stable gait.   Neck:  FROM neck, supple, trachea midline, no meningismus.   Skin:  Color normal for race, warm and dry, no rash. Scan of abdomen with 2 superficial wounds to level of dermis tissue.  No active bleeding.  No surrounding erythema, induration, fluctuance, crepitus, purulent discharge, or lymphangitic streaking.  Wound explored to base and very superficial.  Neuro:  Oriented x3, CN 2-12 intact (grossly), normal motor (grossly), normal sensory (grossly), normal gait. GCS 15  Psych:  Attention normal. Affect normal. Behavior normal. Judgment normal. (+) Self harm thoughts

## 2023-08-20 NOTE — ED BEHAVIORAL HEALTH ASSESSMENT NOTE - RESIDENCE
Sharri Eng M.D.:   patient awake alert well appearing NAD .   LUNGS CTAB no wheeze no crackle.   CARD tachycardic no m/r/g.    Abdomen soft NT ND no rebound no guarding no CVA tenderness.   EXT WWP no edema no calf tenderness CV 2+DP/PT bilaterally.   neuro A&Ox3 gait normal.    skin warm and dry no rash  HEENT: dry mucous membranes, PERRL, EOMI
[employer] WORK[employer]

## 2023-08-20 NOTE — ED BEHAVIORAL HEALTH ASSESSMENT NOTE - NSBHMSEHYG_PSY_A_CORE
Chief complaint:   Chief Complaint   Patient presents with   • Rash     Pt has hives to abdomen, back, and legs that started last night. Pt started new medications in the last month for h pylori infection.        Vitals:  Visit Vitals   • /72   • Pulse 64   • Temp 97.8 °F (36.6 °C) (Temporal Artery)   • Resp 16   • Wt 54.6 kg   • LMP 05/30/2017   • SpO2 98%   • Breastfeeding Yes   • BMI 20.66 kg/m2       HISTORY OF PRESENT ILLNESS     HPI Comments: Patient is a 43 year old female presenting for a rash to her trunk & scalp that started yesterday.  She reports she has been on Flagyl & Amoxicillin x11 days. The rash is red and pruritic.  She states her  has been washing their clothing with a Dawny additive in the past 2 weeks.  No SOB, difficulty swallowing, or facial edema.  She took Claritin with significant relief. No fever, body aches, or chills.  She is breastfeeding.     Rash   This is a new problem. The current episode started yesterday. The problem has been gradually worsening since onset. The affected locations include the abdomen, back and groin. The rash is characterized by itchiness, redness and swelling. She was exposed to a new detergent/soap. Pertinent negatives include no facial edema, fatigue, fever, shortness of breath or sore throat. Treatments tried: Claritin. The treatment provided significant relief.       Other significant problems:  Patient Active Problem List    Diagnosis Date Noted   • History of constipation 03/12/2017     Priority: Low   • Hypothyroidism complicating pregnancy 02/22/2016     Priority: Low   • Osteopenia 02/17/2014     Priority: Low   • Herpes infection 10/30/2013     Priority: Low     Needs to have prophylaxis at 36 weeks gestation     • Panic attacks      Priority: Low   • Seasonal allergies      Priority: Low   • Hypercholesterolemia 02/21/2013     Priority: Low       PAST MEDICAL, FAMILY AND SOCIAL HISTORY     Medications:  Current Outpatient Prescriptions    Medication   • ALPRAZolam (XANAX) 0.25 MG tablet   • metroNIDAZOLE (FLAGYL) 500 MG tablet   • pantoprazole (PROTONIX) 40 MG tablet   • Psyllium (METAMUCIL FIBER PO)   • Prenatal Vit-DSS-Fe Fum-FA (SE- 19) 29-1 MG tablet   • Cholecalciferol (VITAMIN D) 2000 UNITS tablet   • levothyroxine (SYNTHROID, LEVOTHROID) 25 MCG tablet   • predniSONE (DELTASONE) 50 MG tablet   • benzonatate (TESSALON PERLES) 100 MG capsule   • ranitidine (ZANTAC) 150 MG tablet   • citalopram (CELEXA) 10 MG tablet     No current facility-administered medications for this visit.        Allergies:  ALLERGIES:   Allergen Reactions   • Seasonal PRURITUS       Past Medical  History/Surgeries:  Past Medical History:   Diagnosis Date   • Anxiety    • Depression    • Osteopenia    • Other and unspecified hyperlipidemia    • Palpitations    • Panic attack    • Panic attacks    • Personal history of traumatic fracture      shin hairline fx left lower leg   • Pneumonia    • Seasonal allergies    • Unspecified otitis media    • Unspecified sinusitis (chronic)    • Urinary tract infection        Past Surgical History:   Procedure Laterality Date   • ABDOMEN SURGERY      endometriosis removed abd wall and hernia repair rt side   •  PROCEDURES     • ANTERIOR CRUCIATE LIGAMENT REPAIR      rt knee   • ESOPHAGOGASTRODUODENOSCOPY     • GYNECOLOGIC CRYOSURGERY      laparoscopy w removal of ovarian cyst      • INGUINAL HERNIA REPAIR       bilat of ovary  arlen incision   • KNEE SCOPE,DIAGNOSTIC      Knee Arthroscopy   • KNEE SURGERY      rt  several scopes   • KNEE SURGERY      rt knee hardware removal     • REMOVAL ADENOIDS,PRIMARY,<13 Y/O      age 4   • TYMPANOSTOMY      PE Tubes       Family History:  Family History   Problem Relation Age of Onset   • Arthritis Mother    • High blood pressure Father    • High cholesterol Father    • NEGATIVE FAMILY HX OF Brother    • NEGATIVE FAMILY HX OF Daughter    • NEGATIVE FAMILY HX OF Son    •  Depression Maternal Uncle    • Mental illness Maternal Uncle    • Diabetes Maternal Grandmother    • Depression Maternal Grandfather    • Early death Maternal Grandfather 50     MI   • Heart disease Maternal Grandfather    • Early death Paternal Grandfather 50      liver ca   • Cancer Paternal Grandfather    • NEGATIVE FAMILY HX OF Daughter        Social History:  Social History   Substance Use Topics   • Smoking status: Never Smoker   • Smokeless tobacco: Never Used   • Alcohol use No       REVIEW OF SYSTEMS     Review of Systems   Constitutional: Negative for fatigue and fever.   HENT: Negative for sore throat.    Respiratory: Negative for shortness of breath.    Skin: Positive for rash.       PHYSICAL EXAM     Physical Exam   Constitutional: She is oriented to person, place, and time. Vital signs are normal. She appears well-developed and well-nourished. She is cooperative. She does not appear ill. No distress.   HENT:   Nose: Nose normal.   Mouth/Throat: Uvula is midline, oropharynx is clear and moist and mucous membranes are normal. Mucous membranes are not dry. No oral lesions. No oropharyngeal exudate or posterior oropharyngeal erythema.   Eyes: Conjunctivae, EOM and lids are normal. Pupils are equal, round, and reactive to light.   Neck: Normal range of motion. Neck supple.   Cardiovascular: Normal rate, regular rhythm, normal heart sounds and intact distal pulses.    Pulses:       Radial pulses are 2+ on the right side   Pulmonary/Chest: Effort normal and breath sounds normal. No accessory muscle usage. No respiratory distress.   Musculoskeletal: Normal range of motion. She exhibits no tenderness.   Neurological: She is alert and oriented to person, place, and time.   Skin: Skin is warm and dry. Rash noted. Rash is urticarial. She is not diaphoretic. No pallor.   Nursing note and vitals reviewed.      ASSESSMENT/PLAN     Cathi was seen today for rash.    Diagnoses and all orders for this  visit:    Rash  -     CBC & Auto Differential  -     Lyme IgG & IgM AB Screen  -     predniSONE (DELTASONE) 50 MG tablet; Take 1 tablet by mouth daily for 3 days. First dose due on 6/6/17 with food, take before 5pm.      Plan:  1. Consulted with Dr. Moore for plan of care.  He suggested CBC & Lyme testing & discontinuing Flagyl & Amoxicillin. Advised patient antihistamines will decrease milk supply while breastfeeding & she should use topical anti itch creams. Patient to follow up with Dr. Moore for lab results.  Dr. Saab also assessed the patient.      See the After Visit Summary for additional instructions, follow-up plans and/or emergency room precautions discussed with the patient.    Patient (or parent/guardian if applicable) was in agreement with treatment and discharge plan, appropriately stable at time of discharge from urgent care clinic.     Fair

## 2023-08-20 NOTE — ED PROVIDER NOTE - NS ED ROS FT
Constitutional: (-) fever, (-) chills  HENT: (-) congestion, (-) rhinorrhea, (-) sore throat  Eyes: (-) pain, (-) redness  Respiratory: (-) cough, (-) shortness of breath, (-) wheezing, (-) stridor    Cardiovascular: (-) chest pain, (-) palpitations, (-) leg swelling.   Gastrointestinal: (-) abdominal pain, (-) blood in stool (no melena/hematochezia), (-) diarrhea, (-) vomiting  Genitourinary: (-) dysuria, (-) hematuria  Musculoskeletal: (-) gait problem, (-) joint swelling, (-) myalgias  Skin: (-) color change, (-) rash (+) wound  Neurological: (-) weakness, (-) numbness, (-) headaches  Psychiatric/Behavioral: (-) confusion (+) suicidal thoughts

## 2023-08-20 NOTE — ED ADULT NURSE REASSESSMENT NOTE - NS ED NURSE REASSESS COMMENT FT1
Patient is awake, bladder scan done with 522ml urine noted. Encouraged patient to urinate with 700ml output, urine sample sent to laboratory with request and label. Dr. Bess made aware.

## 2023-09-01 ENCOUNTER — EMERGENCY (EMERGENCY)
Facility: HOSPITAL | Age: 33
LOS: 1 days | Discharge: HOME | End: 2023-09-01
Attending: EMERGENCY MEDICINE
Payer: MEDICAID

## 2023-09-01 VITALS
TEMPERATURE: 98 F | HEART RATE: 105 BPM | SYSTOLIC BLOOD PRESSURE: 111 MMHG | DIASTOLIC BLOOD PRESSURE: 69 MMHG | OXYGEN SATURATION: 95 % | HEIGHT: 71 IN | RESPIRATION RATE: 16 BRPM

## 2023-09-01 VITALS
TEMPERATURE: 98 F | DIASTOLIC BLOOD PRESSURE: 91 MMHG | RESPIRATION RATE: 16 BRPM | SYSTOLIC BLOOD PRESSURE: 128 MMHG | OXYGEN SATURATION: 94 % | HEART RATE: 74 BPM

## 2023-09-01 DIAGNOSIS — Z90.79 ACQUIRED ABSENCE OF OTHER GENITAL ORGAN(S): Chronic | ICD-10-CM

## 2023-09-01 LAB
ALBUMIN SERPL ELPH-MCNC: 3.7 G/DL — SIGNIFICANT CHANGE UP (ref 3.5–5)
ALP SERPL-CCNC: 128 U/L — HIGH (ref 40–120)
ALT FLD-CCNC: 90 U/L DA — HIGH (ref 10–60)
ANION GAP SERPL CALC-SCNC: 8 MMOL/L — SIGNIFICANT CHANGE UP (ref 5–17)
APPEARANCE UR: CLEAR — SIGNIFICANT CHANGE UP
AST SERPL-CCNC: 33 U/L — SIGNIFICANT CHANGE UP (ref 10–40)
BASOPHILS # BLD AUTO: 0.03 K/UL — SIGNIFICANT CHANGE UP (ref 0–0.2)
BASOPHILS NFR BLD AUTO: 0.4 % — SIGNIFICANT CHANGE UP (ref 0–2)
BILIRUB SERPL-MCNC: 0.2 MG/DL — SIGNIFICANT CHANGE UP (ref 0.2–1.2)
BILIRUB UR-MCNC: NEGATIVE — SIGNIFICANT CHANGE UP
BUN SERPL-MCNC: 15 MG/DL — SIGNIFICANT CHANGE UP (ref 7–18)
CALCIUM SERPL-MCNC: 9.1 MG/DL — SIGNIFICANT CHANGE UP (ref 8.4–10.5)
CHLORIDE SERPL-SCNC: 104 MMOL/L — SIGNIFICANT CHANGE UP (ref 96–108)
CO2 SERPL-SCNC: 27 MMOL/L — SIGNIFICANT CHANGE UP (ref 22–31)
COLOR SPEC: YELLOW — SIGNIFICANT CHANGE UP
CREAT SERPL-MCNC: 0.96 MG/DL — SIGNIFICANT CHANGE UP (ref 0.5–1.3)
DIFF PNL FLD: NEGATIVE — SIGNIFICANT CHANGE UP
EGFR: 106 ML/MIN/1.73M2 — SIGNIFICANT CHANGE UP
EOSINOPHIL # BLD AUTO: 0.3 K/UL — SIGNIFICANT CHANGE UP (ref 0–0.5)
EOSINOPHIL NFR BLD AUTO: 3.6 % — SIGNIFICANT CHANGE UP (ref 0–6)
GLUCOSE SERPL-MCNC: 128 MG/DL — HIGH (ref 70–99)
GLUCOSE UR QL: NEGATIVE MG/DL — SIGNIFICANT CHANGE UP
HCT VFR BLD CALC: 46.5 % — SIGNIFICANT CHANGE UP (ref 39–50)
HGB BLD-MCNC: 14.5 G/DL — SIGNIFICANT CHANGE UP (ref 13–17)
IMM GRANULOCYTES NFR BLD AUTO: 0.7 % — SIGNIFICANT CHANGE UP (ref 0–0.9)
KETONES UR-MCNC: 15 MG/DL
LEUKOCYTE ESTERASE UR-ACNC: NEGATIVE — SIGNIFICANT CHANGE UP
LIDOCAIN IGE QN: 61 U/L — SIGNIFICANT CHANGE UP (ref 13–75)
LYMPHOCYTES # BLD AUTO: 2.92 K/UL — SIGNIFICANT CHANGE UP (ref 1–3.3)
LYMPHOCYTES # BLD AUTO: 35.1 % — SIGNIFICANT CHANGE UP (ref 13–44)
MCHC RBC-ENTMCNC: 25 PG — LOW (ref 27–34)
MCHC RBC-ENTMCNC: 31.2 GM/DL — LOW (ref 32–36)
MCV RBC AUTO: 80.3 FL — SIGNIFICANT CHANGE UP (ref 80–100)
MONOCYTES # BLD AUTO: 0.58 K/UL — SIGNIFICANT CHANGE UP (ref 0–0.9)
MONOCYTES NFR BLD AUTO: 7 % — SIGNIFICANT CHANGE UP (ref 2–14)
NEUTROPHILS # BLD AUTO: 4.44 K/UL — SIGNIFICANT CHANGE UP (ref 1.8–7.4)
NEUTROPHILS NFR BLD AUTO: 53.2 % — SIGNIFICANT CHANGE UP (ref 43–77)
NITRITE UR-MCNC: NEGATIVE — SIGNIFICANT CHANGE UP
NRBC # BLD: 0 /100 WBCS — SIGNIFICANT CHANGE UP (ref 0–0)
PH UR: 5.5 — SIGNIFICANT CHANGE UP (ref 5–8)
PLATELET # BLD AUTO: 254 K/UL — SIGNIFICANT CHANGE UP (ref 150–400)
POTASSIUM SERPL-MCNC: 4 MMOL/L — SIGNIFICANT CHANGE UP (ref 3.5–5.3)
POTASSIUM SERPL-SCNC: 4 MMOL/L — SIGNIFICANT CHANGE UP (ref 3.5–5.3)
PROT SERPL-MCNC: 7.6 G/DL — SIGNIFICANT CHANGE UP (ref 6–8.3)
PROT UR-MCNC: NEGATIVE MG/DL — SIGNIFICANT CHANGE UP
RBC # BLD: 5.79 M/UL — SIGNIFICANT CHANGE UP (ref 4.2–5.8)
RBC # FLD: 13.7 % — SIGNIFICANT CHANGE UP (ref 10.3–14.5)
SODIUM SERPL-SCNC: 139 MMOL/L — SIGNIFICANT CHANGE UP (ref 135–145)
SP GR SPEC: 1.02 — SIGNIFICANT CHANGE UP (ref 1–1.03)
UROBILINOGEN FLD QL: 0.2 MG/DL — SIGNIFICANT CHANGE UP (ref 0.2–1)
WBC # BLD: 8.33 K/UL — SIGNIFICANT CHANGE UP (ref 3.8–10.5)
WBC # FLD AUTO: 8.33 K/UL — SIGNIFICANT CHANGE UP (ref 3.8–10.5)

## 2023-09-01 PROCEDURE — 73110 X-RAY EXAM OF WRIST: CPT

## 2023-09-01 PROCEDURE — 81003 URINALYSIS AUTO W/O SCOPE: CPT

## 2023-09-01 PROCEDURE — 85025 COMPLETE CBC W/AUTO DIFF WBC: CPT

## 2023-09-01 PROCEDURE — 99285 EMERGENCY DEPT VISIT HI MDM: CPT

## 2023-09-01 PROCEDURE — 83690 ASSAY OF LIPASE: CPT

## 2023-09-01 PROCEDURE — 80053 COMPREHEN METABOLIC PANEL: CPT

## 2023-09-01 PROCEDURE — 73110 X-RAY EXAM OF WRIST: CPT | Mod: 26,LT

## 2023-09-01 PROCEDURE — 74177 CT ABD & PELVIS W/CONTRAST: CPT | Mod: 26,MA

## 2023-09-01 PROCEDURE — 87086 URINE CULTURE/COLONY COUNT: CPT

## 2023-09-01 PROCEDURE — 74177 CT ABD & PELVIS W/CONTRAST: CPT | Mod: MA

## 2023-09-01 PROCEDURE — 36415 COLL VENOUS BLD VENIPUNCTURE: CPT

## 2023-09-01 PROCEDURE — 99284 EMERGENCY DEPT VISIT MOD MDM: CPT | Mod: 25

## 2023-09-01 RX ORDER — ACETAMINOPHEN 500 MG
650 TABLET ORAL ONCE
Refills: 0 | Status: DISCONTINUED | OUTPATIENT
Start: 2023-09-01 | End: 2023-09-01

## 2023-09-01 NOTE — ED PROVIDER NOTE - PHYSICAL EXAMINATION
Heart regular lungs clear abdomen soft with mid abdominal tenderness to palpation.  Patient ambulatory with steady gait in the ER.  Left wrist swelling and tenderness to palpation.  2+ radial pulse.

## 2023-09-01 NOTE — ED PROVIDER NOTE - PATIENT PORTAL LINK FT
You can access the FollowMyHealth Patient Portal offered by [facility] by registering at the following website: [email or web address]. By joining [employer]’s FollowMyHealth portal, you will also be able to view your health information using other applications (apps) compatible with our system.

## 2023-09-01 NOTE — ED ADULT NURSE NOTE - OBJECTIVE STATEMENT
As per patient c/o abdominal pain x1 day. Patient also reports nausea and vomiting x1 day, but denies any diarrhea. Patient denies any chest pain, SOB, or dyspnea on exertion. No apparent distress noted. Pt denies all other signs and symptoms

## 2023-09-01 NOTE — ED PROVIDER NOTE - OBJECTIVE STATEMENT
33-year-old presents without abdominal pain vomiting and diarrhea for 1 day.  Furthermore he reports left wrist pain after punching the wall yesterday. forehead/LACERATION

## 2023-09-02 LAB
CULTURE RESULTS: NO GROWTH — SIGNIFICANT CHANGE UP
SPECIMEN SOURCE: SIGNIFICANT CHANGE UP

## 2023-09-07 ENCOUNTER — EMERGENCY (EMERGENCY)
Facility: HOSPITAL | Age: 33
LOS: 1 days | Discharge: HOME | End: 2023-09-07
Attending: EMERGENCY MEDICINE
Payer: MEDICAID

## 2023-09-07 VITALS
OXYGEN SATURATION: 95 % | RESPIRATION RATE: 20 BRPM | HEART RATE: 74 BPM | DIASTOLIC BLOOD PRESSURE: 79 MMHG | TEMPERATURE: 98 F | SYSTOLIC BLOOD PRESSURE: 112 MMHG | WEIGHT: 298.95 LBS | HEIGHT: 71 IN

## 2023-09-07 DIAGNOSIS — Z90.79 ACQUIRED ABSENCE OF OTHER GENITAL ORGAN(S): Chronic | ICD-10-CM

## 2023-09-07 LAB
APPEARANCE UR: CLEAR — SIGNIFICANT CHANGE UP
BILIRUB UR-MCNC: NEGATIVE — SIGNIFICANT CHANGE UP
COLOR SPEC: SIGNIFICANT CHANGE UP
DIFF PNL FLD: NEGATIVE — SIGNIFICANT CHANGE UP
GLUCOSE UR QL: NEGATIVE MG/DL — SIGNIFICANT CHANGE UP
KETONES UR-MCNC: 15 MG/DL
LEUKOCYTE ESTERASE UR-ACNC: NEGATIVE — SIGNIFICANT CHANGE UP
NITRITE UR-MCNC: NEGATIVE — SIGNIFICANT CHANGE UP
PH UR: 6.5 — SIGNIFICANT CHANGE UP (ref 5–8)
PROT UR-MCNC: ABNORMAL MG/DL
SP GR SPEC: 1.03 — HIGH (ref 1–1.03)
UROBILINOGEN FLD QL: 1 MG/DL — SIGNIFICANT CHANGE UP (ref 0.2–1)

## 2023-09-07 PROCEDURE — 87086 URINE CULTURE/COLONY COUNT: CPT

## 2023-09-07 PROCEDURE — 76870 US EXAM SCROTUM: CPT

## 2023-09-07 PROCEDURE — 99284 EMERGENCY DEPT VISIT MOD MDM: CPT | Mod: 25

## 2023-09-07 PROCEDURE — 76870 US EXAM SCROTUM: CPT | Mod: 26

## 2023-09-07 PROCEDURE — 87591 N.GONORRHOEAE DNA AMP PROB: CPT

## 2023-09-07 PROCEDURE — 81001 URINALYSIS AUTO W/SCOPE: CPT

## 2023-09-07 PROCEDURE — 99284 EMERGENCY DEPT VISIT MOD MDM: CPT

## 2023-09-07 PROCEDURE — 87491 CHLMYD TRACH DNA AMP PROBE: CPT

## 2023-09-07 RX ORDER — ACETAMINOPHEN 500 MG
2 TABLET ORAL
Qty: 30 | Refills: 0
Start: 2023-09-07

## 2023-09-07 NOTE — ED ADULT NURSE NOTE - OBJECTIVE STATEMENT
Patient is alert, awake. Resides in group home. 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

## 2023-09-07 NOTE — ED PROVIDER NOTE - OBJECTIVE STATEMENT
33-year-old male history of diabetes, hyperlipidemia, BPH, hypothyroid, GERD, autism presents with testicular pain for the past 2 months.  Patient follows with Dr. Porras of urology.  Patient saw Dr. Porras in the clinic yesterday and outpatient MRI was scheduled patient had known left testicle mass and had a left orchiectomy.  Discussed with Dr. Porras patient does not need ultrasound or lab work done here and work-up for testicular mass to be done as outpatient with MRI already scheduled

## 2023-09-07 NOTE — ED PROVIDER NOTE - NSFOLLOWUPINSTRUCTIONS_ED_ALL_ED_FT
Testicle Pain    WHAT YOU NEED TO KNOW:    Testicle pain may start in your scrotum and spread to your abdomen. You may have sharp, sudden pain or dull pain that happens over time. Your testicle pain may come and go, or it may last for a long time. The cause of your pain may be unknown. Testicle pain can be caused by infection, trauma, hernia, kidney stones, or sexually transmitted infections (STIs). You may have a painful lump in your scrotum. The lump may be caused by an enlarged vein or fluid that collects around one of your testicles. This lump also may be caused by a more serious medical condition. Part of your testicle may twist. This is a serious condition that needs treatment as soon as possible.    DISCHARGE INSTRUCTIONS:    Medicines:    Antibiotics: This medicine helps fight or prevent infection. Take your antibiotics until they are gone, even if you feel better.    Pain medicine: You may be given a prescription medicine to decrease pain. Do not wait until the pain is severe before you take this medicine.    NSAIDs: These medicines decrease swelling, pain, and fever. NSAIDs are available without a doctor's order. Ask your healthcare provider which medicine is right for you. Ask how much to take and when to take it. Take as directed. NSAIDs can cause stomach bleeding and kidney problems if not taken correctly.    Take your medicine as directed. Contact your healthcare provider if you think your medicine is not helping or if you have side effects. Tell your provider if you are allergic to any medicine. Keep a list of the medicines, vitamins, and herbs you take. Include the amounts, and when and why you take them. Bring the list or the pill bottles to follow-up visits. Carry your medicine list with you in case of an emergency.  Decrease discomfort: With treatment, your pain may improve within 1 to 3 days. Depending on the cause of your testicle pain, your condition may take up to 4 weeks to heal.    Rest: Limit your activity until your pain decreases. Get more rest while you heal. Do not sit for long periods of time.    Cold packs: Place cold packs on your testicles to help ease your pain. Use cold packs as directed.    Elevation: Gently tuck a folded towel under your testicles to lift them as you sit in a chair or lie in bed. This will help ease your pain and decrease swelling.  Follow up with your healthcare provider or urologist in 3 to 7 days: Write down your questions so you remember to ask them during your visits.    Sexual activity: Avoid sexual activity until you have finished your antibiotics or until your healthcare provider tells you it is safe to have sex. Use condoms to lower your risk of STIs.    Contact your healthcare provider or urologist if:    You feel that your medicine or treatment is not working.    You feel more pain, tenderness, or swelling than before.    You have nausea or a low fever.    You have questions or concerns about your condition or care.  Return to the emergency department if:    You have sudden or severe pain in your testicles or abdomen.    You have pain in both testicles.    You are vomiting.    You have a high fever.    Your pain increases when you elevate your testicles.    Your scrotum turns blue. This could mean your testicle is not getting the blood flow it needs.  © [employer] 1973, 2023    	  back to top            © [employer] 1973, 2023

## 2023-09-07 NOTE — ED PROVIDER NOTE - CLINICAL SUMMARY MEDICAL DECISION MAKING FREE TEXT BOX
33-year-old male presents to ED with right testicular pain and mass for the past 2 months.  Ultrasound already done as outpatient and MRI scheduled.  Patient came to ED in hopes of expediting MRI however in discussion with patient's urologist we will keep scheduled outpatient MRI.  Will check urinalysis and give analgesia discharge with outpatient follow-up

## 2023-09-07 NOTE — ED PROVIDER NOTE - PATIENT PORTAL LINK FT
You can access the FollowMyHealth Patient Portal offered by [facility] by registering at the following website: [email or web address]. By joining [employer]’s FollowMyHealth portal, you will also be able to view your health information using other applications (apps) compatible with our system.

## 2023-09-07 NOTE — ED ADULT NURSE NOTE - NSFALLUNIVINTERV_ED_ALL_ED
Bed/Stretcher in lowest position, wheels locked, appropriate side rails in place/Call bell, personal items and telephone in reach/Instruct patient to call for assistance before getting out of bed/chair/stretcher/Non-slip footwear applied when patient is off stretcher/[place] to call system/Physically safe environment - no spills, clutter or unnecessary equipment/Purposeful proactive rounding/Room/bathroom lighting operational, light cord in reach

## 2023-09-07 NOTE — ED PROVIDER NOTE - NS ED MD DISPO DISCHARGE CCDA
Problem: Patient Centered Care  Goal: Patient preferences are identified and integrated in the patient's plan of care  Description  Interventions:  - What would you like us to know as we care for you?   - Provide timely, complete, and accurate informatio nausea/vomiting on shift. Tolerating diet well. IVF DC'd per md's order. VSS. No c/o pain or discomfort at this time. Patient/Caregiver provided printed discharge information.

## 2023-09-09 LAB
CULTURE RESULTS: SIGNIFICANT CHANGE UP
SPECIMEN SOURCE: SIGNIFICANT CHANGE UP

## 2023-09-13 NOTE — CONSULT NOTE ADULT - I WAS PHYSICALLY PRESENT FOR THE KEY PORTIONS OF THE EVALUATION AND MANAGEMENT (E/M) SERVICE PROVIDED.  I AGREE WITH THE ABOVE HISTORY, PHYSICAL, AND PLAN WHICH I HAVE REVIEWED AND EDITED WHERE APPROPRIATE
63 year old female with a past medical history of afib, and sciatica, who presented to [facility] for shortness of breath. She had gone to [facility] where she had CXR which showed bilateral lower lobe infiltrates so was sent to ER. She had been having exertional dyspnea and hypoxemia (on home pulse ox to 82%). She had been having dyspnea for several months and had a workup in [place] with a CT scan (which was negative for PE). She also states that she was seen by a pulmonologist and rheumatology, where they ruled out lupus and other immunological disorders.    [facility]  Found to be hypoxic and have interstitial lung disease appearance on CT, admitted 8/26 for AHRF, further ILD workup and management. TTE 8/26 with normal LVEF. Pt was started on prednisone on admission with gradually improving O2 requirement and has been stable on 2-3LNC since 9/3. Started steroid taper on 9/6 and fully transitioned to PO 9/8 overnight. Started on Mycophenolate mofetil (cellcept) 9/8 evening but pt reported subjective side effects with weakness. Episode of AF w RVR with HR up to 140s on 9/11 am, decreased to HR 10-110s with IV lopressor 10. CTA negative for PE and showed interval improvements in GGO but possible lingular bleb and RML bronchiectasis. Patient received 2L of but before more fluids and beta-blocker pt developed heart palpitations with EKG HR 170s with SVT. BPs 105/70s. Did not respond to vagal maneuvers. Pt given oral lopressor 12.5 and IV lopressor 5, with improvement of HR to 130s. SBP briefly dropped to 60-70s then recovered. Patient on NRB offered HFNC/BiPAP but refused. Started on empiric vancomycin and zosyn. BCx w/ NGTD. Per pulm increased solumedrol to 40mg qd. Patient acceded to HFNC in which she desatted and presented with increased wob for which she was transitioned to BiPAP. Patient with anxiety while on BiPAP presenting tachypnea and desatting to the 80s despite verbal redirection for which she was administered ativan 1mg IVP x1. Patient distress improved with sats in the low 90s but had desaturation to 70s. STAT CXR showed increased pulmonary congestion for which patient was administered lasix without improvement. Intubated and started on mechanical ventilation but required very high PEEP (18) and 100% FiO2 and still had low saturations. [facility] ECMO team consulted and accepted to [facility] center. Per signout patient had RV enlargement and dysfunction on POCUS with concern for either new PE vs high pulmonary pressures due to PEEP 18 and lung dysfunction. Patient went for cannulation at [facility] with flouro showing no acute PE. Cannulated with 25 F drainage cannula in R Fem V and 23F “arterial” cannula in RIJ.   
Statement Selected

## 2023-09-14 ENCOUNTER — EMERGENCY (EMERGENCY)
Facility: HOSPITAL | Age: 33
LOS: 1 days | Discharge: HOME | End: 2023-09-14
Attending: STUDENT IN AN ORGANIZED HEALTH CARE EDUCATION/TRAINING PROGRAM
Payer: MEDICAID

## 2023-09-14 VITALS
RESPIRATION RATE: 18 BRPM | TEMPERATURE: 98 F | HEIGHT: 71 IN | HEART RATE: 82 BPM | DIASTOLIC BLOOD PRESSURE: 81 MMHG | SYSTOLIC BLOOD PRESSURE: 112 MMHG | OXYGEN SATURATION: 95 % | WEIGHT: 298.95 LBS

## 2023-09-14 VITALS
OXYGEN SATURATION: 96 % | RESPIRATION RATE: 18 BRPM | DIASTOLIC BLOOD PRESSURE: 75 MMHG | TEMPERATURE: 98 F | HEART RATE: 65 BPM | SYSTOLIC BLOOD PRESSURE: 118 MMHG

## 2023-09-14 DIAGNOSIS — F79 UNSPECIFIED INTELLECTUAL DISABILITIES: ICD-10-CM

## 2023-09-14 DIAGNOSIS — F84.0 AUTISTIC DISORDER: ICD-10-CM

## 2023-09-14 DIAGNOSIS — Z90.79 ACQUIRED ABSENCE OF OTHER GENITAL ORGAN(S): Chronic | ICD-10-CM

## 2023-09-14 DIAGNOSIS — Z76.5 MALINGERER [CONSCIOUS SIMULATION]: ICD-10-CM

## 2023-09-14 LAB
ALBUMIN SERPL ELPH-MCNC: 3.5 G/DL — SIGNIFICANT CHANGE UP (ref 3.5–5)
ALP SERPL-CCNC: 94 U/L — SIGNIFICANT CHANGE UP (ref 40–120)
ALT FLD-CCNC: 82 U/L DA — HIGH (ref 10–60)
ANION GAP SERPL CALC-SCNC: 5 MMOL/L — SIGNIFICANT CHANGE UP (ref 5–17)
APAP SERPL-MCNC: <2 UG/ML — LOW (ref 10–30)
AST SERPL-CCNC: 31 U/L — SIGNIFICANT CHANGE UP (ref 10–40)
BASOPHILS # BLD AUTO: 0.04 K/UL — SIGNIFICANT CHANGE UP (ref 0–0.2)
BASOPHILS NFR BLD AUTO: 0.6 % — SIGNIFICANT CHANGE UP (ref 0–2)
BILIRUB SERPL-MCNC: 0.2 MG/DL — SIGNIFICANT CHANGE UP (ref 0.2–1.2)
BUN SERPL-MCNC: 14 MG/DL — SIGNIFICANT CHANGE UP (ref 7–18)
CALCIUM SERPL-MCNC: 8.7 MG/DL — SIGNIFICANT CHANGE UP (ref 8.4–10.5)
CHLORIDE SERPL-SCNC: 107 MMOL/L — SIGNIFICANT CHANGE UP (ref 96–108)
CO2 SERPL-SCNC: 27 MMOL/L — SIGNIFICANT CHANGE UP (ref 22–31)
CREAT SERPL-MCNC: 1.02 MG/DL — SIGNIFICANT CHANGE UP (ref 0.5–1.3)
EGFR: 99 ML/MIN/1.73M2 — SIGNIFICANT CHANGE UP
EOSINOPHIL # BLD AUTO: 0.38 K/UL — SIGNIFICANT CHANGE UP (ref 0–0.5)
EOSINOPHIL NFR BLD AUTO: 5.5 % — SIGNIFICANT CHANGE UP (ref 0–6)
ETHANOL SERPL-MCNC: 6 MG/DL — SIGNIFICANT CHANGE UP (ref 0–10)
GLUCOSE SERPL-MCNC: 126 MG/DL — HIGH (ref 70–99)
HCT VFR BLD CALC: 44.7 % — SIGNIFICANT CHANGE UP (ref 39–50)
HGB BLD-MCNC: 14 G/DL — SIGNIFICANT CHANGE UP (ref 13–17)
IMM GRANULOCYTES NFR BLD AUTO: 0.7 % — SIGNIFICANT CHANGE UP (ref 0–0.9)
LYMPHOCYTES # BLD AUTO: 2.79 K/UL — SIGNIFICANT CHANGE UP (ref 1–3.3)
LYMPHOCYTES # BLD AUTO: 40.1 % — SIGNIFICANT CHANGE UP (ref 13–44)
MCHC RBC-ENTMCNC: 25 PG — LOW (ref 27–34)
MCHC RBC-ENTMCNC: 31.3 GM/DL — LOW (ref 32–36)
MCV RBC AUTO: 79.8 FL — LOW (ref 80–100)
MONOCYTES # BLD AUTO: 0.43 K/UL — SIGNIFICANT CHANGE UP (ref 0–0.9)
MONOCYTES NFR BLD AUTO: 6.2 % — SIGNIFICANT CHANGE UP (ref 2–14)
NEUTROPHILS # BLD AUTO: 3.27 K/UL — SIGNIFICANT CHANGE UP (ref 1.8–7.4)
NEUTROPHILS NFR BLD AUTO: 46.9 % — SIGNIFICANT CHANGE UP (ref 43–77)
NRBC # BLD: 0 /100 WBCS — SIGNIFICANT CHANGE UP (ref 0–0)
PLATELET # BLD AUTO: 200 K/UL — SIGNIFICANT CHANGE UP (ref 150–400)
POTASSIUM SERPL-MCNC: 3.9 MMOL/L — SIGNIFICANT CHANGE UP (ref 3.5–5.3)
POTASSIUM SERPL-SCNC: 3.9 MMOL/L — SIGNIFICANT CHANGE UP (ref 3.5–5.3)
PROT SERPL-MCNC: 6.9 G/DL — SIGNIFICANT CHANGE UP (ref 6–8.3)
RBC # BLD: 5.6 M/UL — SIGNIFICANT CHANGE UP (ref 4.2–5.8)
RBC # FLD: 13.5 % — SIGNIFICANT CHANGE UP (ref 10.3–14.5)
SALICYLATES SERPL-MCNC: <1.7 MG/DL — LOW (ref 2.8–20)
SODIUM SERPL-SCNC: 139 MMOL/L — SIGNIFICANT CHANGE UP (ref 135–145)
WBC # BLD: 6.96 K/UL — SIGNIFICANT CHANGE UP (ref 3.8–10.5)
WBC # FLD AUTO: 6.96 K/UL — SIGNIFICANT CHANGE UP (ref 3.8–10.5)

## 2023-09-14 PROCEDURE — 99285 EMERGENCY DEPT VISIT HI MDM: CPT

## 2023-09-14 PROCEDURE — 80053 COMPREHEN METABOLIC PANEL: CPT

## 2023-09-14 PROCEDURE — 93005 ELECTROCARDIOGRAM TRACING: CPT

## 2023-09-14 PROCEDURE — 80307 DRUG TEST PRSMV CHEM ANLYZR: CPT

## 2023-09-14 PROCEDURE — 85025 COMPLETE CBC W/AUTO DIFF WBC: CPT

## 2023-09-14 PROCEDURE — 36415 COLL VENOUS BLD VENIPUNCTURE: CPT

## 2023-09-14 PROCEDURE — 99285 EMERGENCY DEPT VISIT HI MDM: CPT | Mod: 25

## 2023-09-14 PROCEDURE — 93010 ELECTROCARDIOGRAM REPORT: CPT

## 2023-09-14 PROCEDURE — 90792 PSYCH DIAG EVAL W/MED SRVCS: CPT | Mod: 95

## 2023-09-14 RX ORDER — GABAPENTIN 400 MG/1
400 CAPSULE ORAL ONCE
Refills: 0 | Status: COMPLETED | OUTPATIENT
Start: 2023-09-14 | End: 2023-09-14

## 2023-09-14 RX ORDER — DIVALPROEX SODIUM 500 MG/1
500 TABLET, DELAYED RELEASE ORAL ONCE
Refills: 0 | Status: COMPLETED | OUTPATIENT
Start: 2023-09-14 | End: 2023-09-14

## 2023-09-14 RX ORDER — RISPERIDONE 4 MG/1
3 TABLET ORAL ONCE
Refills: 0 | Status: COMPLETED | OUTPATIENT
Start: 2023-09-14 | End: 2023-09-14

## 2023-09-14 RX ADMIN — RISPERIDONE 3 MILLIGRAM(S): 4 TABLET ORAL at 09:51

## 2023-09-14 RX ADMIN — DIVALPROEX SODIUM 500 MILLIGRAM(S): 500 TABLET, DELAYED RELEASE ORAL at 09:51

## 2023-09-14 RX ADMIN — GABAPENTIN 400 MILLIGRAM(S): 400 CAPSULE ORAL at 09:46

## 2023-09-14 NOTE — ED BEHAVIORAL HEALTH ASSESSMENT NOTE - SAFETY PLAN ADDT'L DETAILS
Safety plan discussed with.../Education provided regarding environmental safety / lethal means restriction/Provision of National Suicide Prevention Lifeline 6-819-324-JSXF (6750)

## 2023-09-14 NOTE — ED BEHAVIORAL HEALTH ASSESSMENT NOTE - SUMMARY
Markus is a 33 year-old male, single, disabled, non-caregiver, living at a [facility], with PMHx of asthma, DM, urinary retention, GERD, BPH, hypothyroidism, HLD, HTN, and b/l myopia, with PPHx of moderate ID, ASD, impulse control disorder, factitious disorder, HAVEN, mood disorders, PTSD and ADHD, hx of multiple past psych admissions (last at [facility] 7/13-21/2023), numerous ED visits for both medical/psych complaints, well-known to telepsychiatry for frequent similar presentations, longstanding h/o SIB (head banging, cutting), no known SA, longstanding h/o endorsing SI, h/o aggression toward staff at , on Depakote 500 mg qAM & 1000mg qPM, Guanfacine 2 mg, Risperdal 3 mg BID, and Remeron 30 mg, prescribed by his PCP, who presents to the ED BIB EMS initially with complaint of urinary retention, and then upon arrival changing his complaint to SI "to go to psychiatry". Patient's presentation today is consistent with multiple other similar ED presentations. He is clear with this MD that his intention by coming to the ED today was to get admitted "to psychiatry" because he is unhappy with his current residence and wants "to move". This morning he now denies SI and in the ED has been calm, cooperating and not evidencing or endorsing any acutely dangerous psychiatric symptoms or any symptoms consistent with an acute mood or psychotic disorder that would be amenable to inpatient treatment. Patient is also not exhibiting any acutely dangerous behaviors. Collateral obtained from patient's group home is reassuring. Patient is agreeable to returning to residence this morning and is able to engage readily in safety planning. Presentation is most consistent with diagnoses of moderate intellectual disability, ASD, and malingering.

## 2023-09-14 NOTE — ED PROVIDER NOTE - PATIENT PORTAL LINK FT
You can access the FollowMyHealth Patient Portal offered by [facility] by registering at the following website: [email or web address]. By joining [employer]’s FollowMyHealth portal, you will also be able to view your health information using other applications (apps) compatible with our system.

## 2023-09-14 NOTE — ED BEHAVIORAL HEALTH ASSESSMENT NOTE - OTHER
with peers - [facility], [facility], , [facility] , [facility], [facility], [facility] CL, [facility], [facility] CL, [facility] Inpatient Psychiatry, [facility] Outpatient Medical, [facility], jaylin, google search, [facility] Inpatient Psychiatry, Raquel, [facility] ED ELVER WORKMAN supervisor chronically limited concrete lives in supervised setting; connected to care

## 2023-09-14 NOTE — ED BEHAVIORAL HEALTH ASSESSMENT NOTE - DIFFERENTIAL
moderate intellectual disability, ASD, malingering, unspecified personality disorders, impulse control disorders, conduct disorder, factitious disorder, HAVEN, mood disorders, PTSD and ADHD

## 2023-09-14 NOTE — ED PROVIDER NOTE - PROGRESS NOTE DETAILS
patient endorsed from Dr. Miller. Pending Tele Psych evaluation. Patient seen by Tele-psych. Case d/w Dr. Burnett, psychiatry attending, who saw the patient. Cleared by psych for discharge back to his group home. Will order his morning meds - Depakote 500mg, Risperidone 3mg, Gabapentin 400mg. Group home aide at bedside will arrange for transportation home.
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
## 2023-09-14 NOTE — ED ADULT NURSE NOTE - NSFALLUNIVINTERV_ED_ALL_ED
Bed/Stretcher in lowest position, wheels locked, appropriate side rails in place/Call bell, personal items and telephone in reach/Instruct patient to call for assistance before getting out of bed/chair/stretcher/Non-slip footwear applied when patient is off stretcher/[place] to call system/Physically safe environment - no spills, clutter or unnecessary equipment/Purposeful proactive rounding/Room/bathroom lighting operational, light cord in reach
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
## 2023-09-14 NOTE — ED PROVIDER NOTE - NSFOLLOWUPINSTRUCTIONS_ED_ALL_ED_FT
1. TAKE ALL OF YOUR PRESCRIBED OR OVER THE COUNTER MEDICATIONS AS DIRECTED.    2. FOR PAIN OR FEVER YOU CAN TAKE IBUPROFEN (MOTRIN, ADVIL), NAPROXEN (ALEVE) OR ACETAMINOPHEN (TYLENOL) AS NEEDED, AS DIRECTED ON PACKAGING.  3. FOLLOW UP WITH YOUR PRIMARY DOCTOR WITHIN 5 DAYS, OR SOONER IF DIRECTED.  4. IF YOU HAD LABS OR IMAGING DONE, YOU WERE GIVEN COPIES OF ALL LABS AND/OR IMAGING RESULTS FROM YOUR ER VISIT--PLEASE TAKE THEM WITH YOU TO YOUR FOLLOW UP APPOINTMENTS.  5. IF NEEDED, CALL PATIENT ACCESS SERVICES AT [phone] TO FIND A PRIMARY CARE PHYSICIAN.  OR CALL [phone] TO MAKE AN APPOINTMENT WITH THE CLINIC.  6. YOU CAN FIND PHYSICIANS OF ALL SPECIALITIES BY VISITING [facility] AND CLICKING ON "FIND A DOCTOR".  7. RETURN TO THE ER FOR ANY WORSENING SYMPTOMS OR CONCERNS.    THANK YOU FOR COMING TO [facility].  HAVE A NICE DAY.

## 2023-09-14 NOTE — ED BEHAVIORAL HEALTH NOTE - BEHAVIORAL HEALTH NOTE
Collateral (Chiquita [facility] Supervisor) has requested that the information provided remain confidential: Yes [X] No []  Collateral (Chiquita, [facility]) has provided information that patient is/may be unaware of: Yes [X] No []    Patient gives permission to obtain collateral from _____:  (  ) Yes  (X)  No    Rationale for overriding objection            (  ) Lack of capacity. Details: ________            (X) Assessing risk of danger to self/others. Details: ________    Rationale for selecting specific collateral source            (X) Potential to impact risk of danger to self/others and no alternative equivalent. Details: _____”    ========================  FOR EACH COLLATERAL:  ========================  NAME: Chiquita   NUMBER: [phone]  RELATIONSHIP:  [facility]  ========================  PATIENT DEMOGRAPHICS:  ========================    HPI:    This is a 33-year-old, English speaking male, single, domiciled in [facility], PPhx of Autism, Intellectual Disability, Anxiety, mood Disorder, impulse control Disorder, and ADHD, PMhx of Diabetes, HLD, BPH, Hypothyroidism, GERD, hx of inpatient psychiatric hospitalizations (most recently in July 2023), hx of multiple ER visits in the setting of malingers, hx of self-injurious behavior and cutting, BIBEMS from [facility] for urinary retention, and then endorsed suicidal ideations.      Chiquita reports that patient has a history of self-injurious behavior, however he is on a 1 to 1 with staff at the [facility], and sharp objects are locked away from pt. Chiquita reports that she doesn't know of any recent self-injurious behavior. Chiquita reports that he will "try to hurt himself to go to the hospital" because "he likes the hospital setting." Chiquita reports no recent suicidal ideations, and "nothing triggers him." Chiquita reports that patient has been complaining of urinary problems, and has an MRI appointment today, 9/14/23, for this problem.    Chiquita reports that Keon (#: [phone]), staff at [facility], is currently in the ED with patient and can be reached by calling [facility] ED. Writer contacts Keon in the ED, who states that patient is calm and cooperative in ED now as well as overnight, and came to the ED originally stating, "I can't pee," then endorsed suicidal ideations.

## 2023-09-14 NOTE — ED ADULT NURSE NOTE - COVID-19 RESULT DATE/TIME
Neuro/Psych ROS              GI/Hepatic/Renal: Neg GI/Hepatic/Renal ROS       (-) hiatal hernia and GERD       Endo/Other: Negative Endo/Other ROS                    Abdominal:           Vascular:                                   NPO > MN    SR    Pre-Operative Diagnosis: MEDIAL MENISCUS TEAR,LATERAL MENISCUS TEAR,AND OSTEOARTHRITIS RIGHT KNEE    61 y.o.   BMI:  Body mass index is 31.71 kg/m². Vitals:    08/21/18 1550 08/28/18 0655   BP:  (!) 151/87   Pulse:  100   Resp:  16   Temp:  97.7 °F (36.5 °C)   TempSrc:  Temporal   SpO2:  97%   Weight: 183 lb (83 kg) 179 lb (81.2 kg)   Height: 5' 3\" (1.6 m) 5' 3\" (1.6 m)       Allergies   Allergen Reactions    Codeine Nausea And Vomiting       Social History   Substance Use Topics    Smoking status: Never Smoker    Smokeless tobacco: Never Used    Alcohol use Yes      Comment: 2-3 drinks per week       LABS:    CBC  No results found for: WBC, HGB, HCT, PLT  RENAL  No results found for: NA, K, CL, CO2, BUN, CREATININE, GLUCOSE  COAGS  No results found for: PROTIME, INR, APTT         Anesthesia Plan      general     ASA 2     (I discussed with the patient the risks and benefits of PIV, general anesthesia, IV Narcotics, PACU. All questions were answered the patient agrees with the plan)  Induction: intravenous. Anesthetic plan and risks discussed with patient and spouse. Plan discussed with CRNA.                 Avelina Olivier MD   8/27/2018 20-Aug-2023 03:01
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
## 2023-09-14 NOTE — ED BEHAVIORAL HEALTH ASSESSMENT NOTE - GENERAL APPEARANCE
does not appear to be internally preoccupied or to be responding to internal stimuli/Developmentally delayed

## 2023-09-14 NOTE — ED ADULT NURSE NOTE - ED STAT RN HANDOFF DETAILS
report given to Mikie Vera for cont. care, pt well rested , with PCA at bedside for constant observation. Await for psych consult.

## 2023-09-14 NOTE — ED BEHAVIORAL HEALTH ASSESSMENT NOTE - DESCRIPTION
DM, HLD, BPH, hypothyroidism, GERD as per HPI Per EMS runsheet, patient initially had chief complaint of urinary retention, saying he had not been able to pee since yesterday. Upon arrival, he told the ED that he was able to urinate and instead wanted to go to psychiatry. He was medically cleared and telepsychiatry consulted. Throughout his stay in the ED, he has been calm and cooperative in no acute distress.

## 2023-09-14 NOTE — ED ADULT NURSE NOTE - OBJECTIVE STATEMENT
states unable to urinate well since afternoon, has frequent urge to urinate but can't urinate, bladder scan shows 131 ml, accompanied by HHA at bedside.
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
## 2023-09-14 NOTE — ED PROVIDER NOTE - CLINICAL SUMMARY MEDICAL DECISION MAKING FREE TEXT BOX
No
33-year-old male hx of DM, HLD, BPH, hypothyroidism, GERD, autism, frequent ER visits in the setting of malingering, complaining today of desire to harm self. Suspect malingering as usual but will check psych clearance labs, constant observation, Psychiatry consult.

## 2023-09-15 NOTE — ED ADULT NURSE NOTE - CAS TRG GENERAL AIRWAY, MLM
Get your Prescription filled at [facility]!    [facility] uses the same medical record your doctor uses. More accurate and timely information for your doctor and your pharmacy means more effective and safer health care for you and your family.  [facility] accepts all of the major insurance plans which means you pay the same copay wherever you go.  [facility] Pharmacists take the time to explain your medication regimen to you.  [facility] will mail your medications to you free of postage and handling charges. We love kath.       At [facility], one important tool we use to improve our patient services is our Patient Survey.  Following your visit you may receive our survey in the mail.    Please take the time to complete the survey.    If your visit with us was great, we want to hear about it.    If we can improve, please let us know how.         
Patent

## 2023-09-16 ENCOUNTER — TRANSCRIPTION ENCOUNTER (OUTPATIENT)
Age: 33
End: 2023-09-16

## 2023-09-17 ENCOUNTER — EMERGENCY (EMERGENCY)
Facility: HOSPITAL | Age: 33
LOS: 1 days | Discharge: HOME | End: 2023-09-17
Attending: STUDENT IN AN ORGANIZED HEALTH CARE EDUCATION/TRAINING PROGRAM
Payer: MEDICAID

## 2023-09-17 ENCOUNTER — TRANSCRIPTION ENCOUNTER (OUTPATIENT)
Age: 33
End: 2023-09-17

## 2023-09-17 VITALS
DIASTOLIC BLOOD PRESSURE: 77 MMHG | SYSTOLIC BLOOD PRESSURE: 108 MMHG | WEIGHT: 294.98 LBS | HEIGHT: 71 IN | HEART RATE: 93 BPM | RESPIRATION RATE: 18 BRPM | TEMPERATURE: 98 F | OXYGEN SATURATION: 97 %

## 2023-09-17 DIAGNOSIS — Z90.79 ACQUIRED ABSENCE OF OTHER GENITAL ORGAN(S): Chronic | ICD-10-CM

## 2023-09-17 LAB
ALBUMIN SERPL ELPH-MCNC: 3.2 G/DL — LOW (ref 3.5–5)
ALP SERPL-CCNC: 91 U/L — SIGNIFICANT CHANGE UP (ref 40–120)
ALT FLD-CCNC: 63 U/L DA — HIGH (ref 10–60)
ANION GAP SERPL CALC-SCNC: 8 MMOL/L — SIGNIFICANT CHANGE UP (ref 5–17)
AST SERPL-CCNC: 26 U/L — SIGNIFICANT CHANGE UP (ref 10–40)
BASOPHILS # BLD AUTO: 0.04 K/UL — SIGNIFICANT CHANGE UP (ref 0–0.2)
BASOPHILS NFR BLD AUTO: 0.5 % — SIGNIFICANT CHANGE UP (ref 0–2)
BILIRUB SERPL-MCNC: 0.3 MG/DL — SIGNIFICANT CHANGE UP (ref 0.2–1.2)
BUN SERPL-MCNC: 11 MG/DL — SIGNIFICANT CHANGE UP (ref 7–18)
CALCIUM SERPL-MCNC: 8.5 MG/DL — SIGNIFICANT CHANGE UP (ref 8.4–10.5)
CHLORIDE SERPL-SCNC: 107 MMOL/L — SIGNIFICANT CHANGE UP (ref 96–108)
CK SERPL-CCNC: 109 U/L — SIGNIFICANT CHANGE UP (ref 35–232)
CO2 SERPL-SCNC: 23 MMOL/L — SIGNIFICANT CHANGE UP (ref 22–31)
CREAT SERPL-MCNC: 1.06 MG/DL — SIGNIFICANT CHANGE UP (ref 0.5–1.3)
EGFR: 94 ML/MIN/1.73M2 — SIGNIFICANT CHANGE UP
EOSINOPHIL # BLD AUTO: 0.33 K/UL — SIGNIFICANT CHANGE UP (ref 0–0.5)
EOSINOPHIL NFR BLD AUTO: 4.1 % — SIGNIFICANT CHANGE UP (ref 0–6)
FLUAV AG NPH QL: SIGNIFICANT CHANGE UP
FLUBV AG NPH QL: SIGNIFICANT CHANGE UP
GLUCOSE SERPL-MCNC: 152 MG/DL — HIGH (ref 70–99)
HCT VFR BLD CALC: 41.4 % — SIGNIFICANT CHANGE UP (ref 39–50)
HGB BLD-MCNC: 13.2 G/DL — SIGNIFICANT CHANGE UP (ref 13–17)
IMM GRANULOCYTES NFR BLD AUTO: 0.6 % — SIGNIFICANT CHANGE UP (ref 0–0.9)
LYMPHOCYTES # BLD AUTO: 1.81 K/UL — SIGNIFICANT CHANGE UP (ref 1–3.3)
LYMPHOCYTES # BLD AUTO: 22.5 % — SIGNIFICANT CHANGE UP (ref 13–44)
MCHC RBC-ENTMCNC: 25 PG — LOW (ref 27–34)
MCHC RBC-ENTMCNC: 31.9 GM/DL — LOW (ref 32–36)
MCV RBC AUTO: 78.6 FL — LOW (ref 80–100)
MONOCYTES # BLD AUTO: 0.85 K/UL — SIGNIFICANT CHANGE UP (ref 0–0.9)
MONOCYTES NFR BLD AUTO: 10.6 % — SIGNIFICANT CHANGE UP (ref 2–14)
NEUTROPHILS # BLD AUTO: 4.95 K/UL — SIGNIFICANT CHANGE UP (ref 1.8–7.4)
NEUTROPHILS NFR BLD AUTO: 61.7 % — SIGNIFICANT CHANGE UP (ref 43–77)
NRBC # BLD: 0 /100 WBCS — SIGNIFICANT CHANGE UP (ref 0–0)
PLATELET # BLD AUTO: 190 K/UL — SIGNIFICANT CHANGE UP (ref 150–400)
POTASSIUM SERPL-MCNC: 4.4 MMOL/L — SIGNIFICANT CHANGE UP (ref 3.5–5.3)
POTASSIUM SERPL-SCNC: 4.4 MMOL/L — SIGNIFICANT CHANGE UP (ref 3.5–5.3)
PROT SERPL-MCNC: 6.5 G/DL — SIGNIFICANT CHANGE UP (ref 6–8.3)
RBC # BLD: 5.27 M/UL — SIGNIFICANT CHANGE UP (ref 4.2–5.8)
RBC # FLD: 13.8 % — SIGNIFICANT CHANGE UP (ref 10.3–14.5)
SARS-COV-2 RNA SPEC QL NAA+PROBE: SIGNIFICANT CHANGE UP
SODIUM SERPL-SCNC: 138 MMOL/L — SIGNIFICANT CHANGE UP (ref 135–145)
WBC # BLD: 8.03 K/UL — SIGNIFICANT CHANGE UP (ref 3.8–10.5)
WBC # FLD AUTO: 8.03 K/UL — SIGNIFICANT CHANGE UP (ref 3.8–10.5)

## 2023-09-17 PROCEDURE — 85025 COMPLETE CBC W/AUTO DIFF WBC: CPT

## 2023-09-17 PROCEDURE — 82550 ASSAY OF CK (CPK): CPT

## 2023-09-17 PROCEDURE — 87637 SARSCOV2&INF A&B&RSV AMP PRB: CPT

## 2023-09-17 PROCEDURE — 99283 EMERGENCY DEPT VISIT LOW MDM: CPT

## 2023-09-17 PROCEDURE — 99284 EMERGENCY DEPT VISIT MOD MDM: CPT

## 2023-09-17 PROCEDURE — 36415 COLL VENOUS BLD VENIPUNCTURE: CPT

## 2023-09-17 PROCEDURE — 80053 COMPREHEN METABOLIC PANEL: CPT

## 2023-09-17 RX ORDER — ACETAMINOPHEN 500 MG
975 TABLET ORAL ONCE
Refills: 0 | Status: COMPLETED | OUTPATIENT
Start: 2023-09-17 | End: 2023-09-17

## 2023-09-17 RX ADMIN — Medication 975 MILLIGRAM(S): at 19:38

## 2023-09-17 NOTE — ED PROVIDER NOTE - PHYSICAL EXAMINATION
Physical Exam  GEN: Alert and oriented x 3, in no acute distress, speaking full clear sentences  HEENT: NC/AT, PERRL, EOMI, normal oropharynx  NECK: Supple, nontender, FROM  CV: RRR, no m/r/g  PULM: CTA bilat, no wheezing/rales/rhonchi  ABD: Soft, nontender, nondistended. No organomegaly  EXTR: FROM to all extremities, nontender, no edema  SKIN: Warm, dry, no rash  NEURO: AOx3, speaking full clear sentences, SALAS 5/5 strength, ambulating with stable gait

## 2023-09-17 NOTE — ED PROVIDER NOTE - OBJECTIVE STATEMENT
34 yo M w/ PMH mild autism, asthma, HLD, hypothryoidism, high utilizer of this ED, presents for complaints of diffuse muscle aches, fevers, malaise x 6 days. Patient was in [place] visiting his mother and returned today. Pt endorses abd pain w/ vomiting and diarrhea. Reports sore throat. Denies cough

## 2023-09-17 NOTE — ED ADULT NURSE NOTE - OBJECTIVE STATEMENT
Pt presented to ED c/o generalized malaise , body aches ,x 1 week   Today has sore throat and coughing, just returned from [place]

## 2023-09-17 NOTE — ED PROVIDER NOTE - NSFOLLOWUPINSTRUCTIONS_ED_ALL_ED_FT
1. TAKE ALL OF YOUR PRESCRIBED OR OVER THE COUNTER MEDICATIONS AS DIRECTED.    2. FOR PAIN OR FEVER YOU CAN TAKE IBUPROFEN (MOTRIN, ADVIL), NAPROXEN (ALEVE) OR ACETAMINOPHEN (TYLENOL) AS NEEDED, AS DIRECTED ON PACKAGING.  3. FOLLOW UP WITH YOUR PRIMARY DOCTOR WITHIN 5 DAYS, OR SOONER IF DIRECTED.  4. IF YOU HAD LABS OR IMAGING DONE, YOU WERE GIVEN COPIES OF ALL LABS AND/OR IMAGING RESULTS FROM YOUR ER VISIT--PLEASE TAKE THEM WITH YOU TO YOUR FOLLOW UP APPOINTMENTS.  5. IF NEEDED, CALL PATIENT ACCESS SERVICES AT [phone] TO FIND A PRIMARY CARE PHYSICIAN.  OR CALL [phone] TO MAKE AN APPOINTMENT WITH THE CLINIC.  6. YOU CAN FIND PHYSICIANS OF ALL SPECIALITIES BY VISITING [facility] AND CLICKING ON "FIND A DOCTOR".  7. RETURN TO THE ER FOR ANY WORSENING SYMPTOMS OR CONCERNS.    THANK YOU FOR COMING TO [facility].  HAVE A NICE DAY.

## 2023-09-17 NOTE — ED PROVIDER NOTE - CLINICAL SUMMARY MEDICAL DECISION MAKING FREE TEXT BOX
DDx inlcudes COVID vs Flu vs rhabdomyolysis vs. anemia vs. renal failure. Patient is very well-appearing, labs wnl, lungs clear, Abd soft and nontender. Anticipate dc home.

## 2023-09-17 NOTE — ED PROVIDER NOTE - PATIENT PORTAL LINK FT
You can access the FollowMyHealth Patient Portal offered by [facility] by registering at the following website: [email or web address]. By joining [employer]’s FollowMyHealth portal, you will also be able to view your health information using other applications (apps) compatible with our system.

## 2023-09-18 ENCOUNTER — TRANSCRIPTION ENCOUNTER (OUTPATIENT)
Age: 33
End: 2023-09-18

## 2023-09-18 ENCOUNTER — EMERGENCY (EMERGENCY)
Facility: HOSPITAL | Age: 33
LOS: 1 days | Discharge: HOME | End: 2023-09-18
Attending: EMERGENCY MEDICINE
Payer: MEDICAID

## 2023-09-18 VITALS
WEIGHT: 296.08 LBS | OXYGEN SATURATION: 100 % | SYSTOLIC BLOOD PRESSURE: 106 MMHG | TEMPERATURE: 98 F | RESPIRATION RATE: 18 BRPM | HEIGHT: 71 IN | DIASTOLIC BLOOD PRESSURE: 56 MMHG | HEART RATE: 70 BPM

## 2023-09-18 DIAGNOSIS — Z90.79 ACQUIRED ABSENCE OF OTHER GENITAL ORGAN(S): Chronic | ICD-10-CM

## 2023-09-18 PROCEDURE — L9991: CPT

## 2023-09-18 NOTE — ED ADULT TRIAGE NOTE - HEART RATE (BEATS/MIN)
6:24 PM  Change of shift. Care of patient taken over from Dr. Delong Space; H&P reviewed, bedside handoff complete. Awaiting labs. 7:12 PM  Recheck potassium 2.9. Prescribed 2 days of potassium repletion. Advised to follow-up closely with a primary care doctor for recheck. Given return precautions.
Patient (s) and daughter given copy of dc instructions and 1 script(s). Patient (s) and daughter verbalized understanding of instructions and script (s). Patient given a current medication reconciliation form and verbalized understanding of their medications. Patient (s) verbalized understanding of the importance of discussing medications with  his or her physician or clinic they will be following up with. Patient alert and oriented and in no acute distress. Patient escorted to waiting room in wheelchair with daughter.
69

## 2023-09-21 ENCOUNTER — EMERGENCY (EMERGENCY)
Facility: HOSPITAL | Age: 33
LOS: 1 days | Discharge: HOME | End: 2023-09-21
Attending: STUDENT IN AN ORGANIZED HEALTH CARE EDUCATION/TRAINING PROGRAM
Payer: MEDICAID

## 2023-09-21 ENCOUNTER — APPOINTMENT (OUTPATIENT)
Dept: MRI IMAGING | Facility: CLINIC | Age: 33
End: 2023-09-21

## 2023-09-21 VITALS
RESPIRATION RATE: 18 BRPM | OXYGEN SATURATION: 97 % | HEART RATE: 98 BPM | TEMPERATURE: 98 F | SYSTOLIC BLOOD PRESSURE: 127 MMHG | HEIGHT: 71 IN | DIASTOLIC BLOOD PRESSURE: 79 MMHG

## 2023-09-21 DIAGNOSIS — Z90.79 ACQUIRED ABSENCE OF OTHER GENITAL ORGAN(S): Chronic | ICD-10-CM

## 2023-09-21 PROCEDURE — 93010 ELECTROCARDIOGRAM REPORT: CPT

## 2023-09-21 PROCEDURE — 71046 X-RAY EXAM CHEST 2 VIEWS: CPT

## 2023-09-21 PROCEDURE — 99284 EMERGENCY DEPT VISIT MOD MDM: CPT | Mod: 25

## 2023-09-21 PROCEDURE — 73564 X-RAY EXAM KNEE 4 OR MORE: CPT

## 2023-09-21 PROCEDURE — 93005 ELECTROCARDIOGRAM TRACING: CPT

## 2023-09-21 PROCEDURE — 99284 EMERGENCY DEPT VISIT MOD MDM: CPT

## 2023-09-21 RX ORDER — IPRATROPIUM/ALBUTEROL SULFATE 18-103MCG
3 AEROSOL WITH ADAPTER (GRAM) INHALATION ONCE
Refills: 0 | Status: COMPLETED | OUTPATIENT
Start: 2023-09-21 | End: 2023-09-21

## 2023-09-21 NOTE — ED ADULT NURSE NOTE - NSFALLUNIVINTERV_ED_ALL_ED
Bed/Stretcher in lowest position, wheels locked, appropriate side rails in place/Call bell, personal items and telephone in reach/Instruct patient to call for assistance before getting out of bed/chair/stretcher/Non-slip footwear applied when patient is off stretcher/[place] to call system/Physically safe environment - no spills, clutter or unnecessary equipment/Purposeful proactive rounding/Room/bathroom lighting operational, light cord in reach
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
## 2023-09-22 ENCOUNTER — APPOINTMENT (OUTPATIENT)
Dept: HEMATOLOGY ONCOLOGY | Facility: CLINIC | Age: 33
End: 2023-09-22
Payer: MEDICAID

## 2023-09-22 ENCOUNTER — TRANSCRIPTION ENCOUNTER (OUTPATIENT)
Age: 33
End: 2023-09-22

## 2023-09-22 ENCOUNTER — RESULT REVIEW (OUTPATIENT)
Age: 33
End: 2023-09-22

## 2023-09-22 VITALS
BODY MASS INDEX: 42.58 KG/M2 | RESPIRATION RATE: 16 BRPM | TEMPERATURE: 97 F | SYSTOLIC BLOOD PRESSURE: 124 MMHG | DIASTOLIC BLOOD PRESSURE: 88 MMHG | OXYGEN SATURATION: 98 % | WEIGHT: 296.74 LBS | HEART RATE: 85 BPM

## 2023-09-22 LAB
BASOPHILS # BLD AUTO: 0.06 K/UL — SIGNIFICANT CHANGE UP (ref 0–0.2)
BASOPHILS NFR BLD AUTO: 0.7 % — SIGNIFICANT CHANGE UP (ref 0–2)
EOSINOPHIL # BLD AUTO: 0.17 K/UL — SIGNIFICANT CHANGE UP (ref 0–0.5)
EOSINOPHIL NFR BLD AUTO: 2 % — SIGNIFICANT CHANGE UP (ref 0–6)
HCT VFR BLD CALC: 43.9 % — SIGNIFICANT CHANGE UP (ref 39–50)
HGB BLD-MCNC: 13.5 G/DL — SIGNIFICANT CHANGE UP (ref 13–17)
IMM GRANULOCYTES NFR BLD AUTO: 2.2 % — HIGH (ref 0–0.9)
LYMPHOCYTES # BLD AUTO: 3.94 K/UL — HIGH (ref 1–3.3)
LYMPHOCYTES # BLD AUTO: 46.4 % — HIGH (ref 13–44)
MCHC RBC-ENTMCNC: 24.7 PG — LOW (ref 27–34)
MCHC RBC-ENTMCNC: 30.8 G/DL — LOW (ref 32–36)
MCV RBC AUTO: 80.4 FL — SIGNIFICANT CHANGE UP (ref 80–100)
MONOCYTES # BLD AUTO: 0.68 K/UL — SIGNIFICANT CHANGE UP (ref 0–0.9)
MONOCYTES NFR BLD AUTO: 8 % — SIGNIFICANT CHANGE UP (ref 2–14)
NEUTROPHILS # BLD AUTO: 3.46 K/UL — SIGNIFICANT CHANGE UP (ref 1.8–7.4)
NEUTROPHILS NFR BLD AUTO: 40.7 % — LOW (ref 43–77)
NRBC # BLD: 0 /100 WBCS — SIGNIFICANT CHANGE UP (ref 0–0)
PLATELET # BLD AUTO: 222 K/UL — SIGNIFICANT CHANGE UP (ref 150–400)
RBC # BLD: 5.46 M/UL — SIGNIFICANT CHANGE UP (ref 4.2–5.8)
RBC # FLD: 14 % — SIGNIFICANT CHANGE UP (ref 10.3–14.5)
WBC # BLD: 8.5 K/UL — SIGNIFICANT CHANGE UP (ref 3.8–10.5)
WBC # FLD AUTO: 8.5 K/UL — SIGNIFICANT CHANGE UP (ref 3.8–10.5)

## 2023-09-22 PROCEDURE — 99213 OFFICE O/P EST LOW 20 MIN: CPT

## 2023-09-22 PROCEDURE — 73564 X-RAY EXAM KNEE 4 OR MORE: CPT | Mod: 26,RT

## 2023-09-22 PROCEDURE — 71046 X-RAY EXAM CHEST 2 VIEWS: CPT | Mod: 26
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
## 2023-09-22 NOTE — ED PROVIDER NOTE - PATIENT PORTAL LINK FT
You can access the FollowMyHealth Patient Portal offered by [facility] by registering at the following website: [email or web address]. By joining [employer]’s FollowMyHealth portal, you will also be able to view your health information using other applications (apps) compatible with our system.

## 2023-09-22 NOTE — ED PROVIDER NOTE - MDM PATIENT STATEMENT FOR ADDL TREATMENT
Caller: Juju Daly    Relationship: Self    Best call back number: [phone]     Requested Prescriptions:   Requested Prescriptions     Pending Prescriptions Disp Refills    ondansetron ODT (Zofran ODT) 4 MG disintegrating tablet 30 tablet 0     Sig: Place 1 tablet on the tongue Every 8 (Eight) Hours As Needed for Nausea or Vomiting.        Pharmacy where request should be sent: [id number] [address] [facility] [address] [facility] [phone] [facility] [phone] FX     Last office visit with prescribing clinician: 8/1/2023   Last telemedicine visit with prescribing clinician: Visit date not found   Next office visit with prescribing clinician: 11/3/2023     Additional details provided by patient: PATIENT IS OUT    Does the patient have less than a 3 day supply:  [x] Yes  [] No    Would you like a call back once the refill request has been completed: [] Yes [x] No    If the office needs to give you a call back, can they leave a voicemail: [] Yes [x] No    Ronal Dc Rep   09/22/23 09:47 EDT          Patient with one or more new problems requiring additional work-up/treatment.
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
## 2023-09-22 NOTE — ED PROVIDER NOTE - NSFOLLOWUPINSTRUCTIONS_ED_ALL_ED_FT
You were seen in the emergency department for chest and knee pain.    Please follow-up with your primary care doctor within the next 48 hours.     Please take ibuprofen and Tylenol as prescribed on the bottles for pain control.    If you have any worsening symptoms, severe chest pain, trouble breathing, please return to the emergency department.

## 2023-09-22 NOTE — ED PROVIDER NOTE - PHYSICAL EXAMINATION
General: well appearing male, no acute distress   HEENT: normocephalic, atraumatic   Respiratory: normal work of breathing  MSK: right knee ecchymosis, tenderness to palpation, full ROM    Skin: warm, dry, large area of ecchymosis along right lateral chest/axillae    Neuro: A&Ox3  Psych: appropriate affect

## 2023-09-24 LAB
AFP-TM SERPL-MCNC: 3.4 NG/ML
ALBUMIN SERPL ELPH-MCNC: 4.4 G/DL
ALP BLD-CCNC: 107 U/L
ALT SERPL-CCNC: 77 U/L
ANION GAP SERPL CALC-SCNC: 15 MMOL/L
AST SERPL-CCNC: 37 U/L
BILIRUB SERPL-MCNC: 0.2 MG/DL
BUN SERPL-MCNC: 16 MG/DL
CALCIUM SERPL-MCNC: 9.2 MG/DL
CHLORIDE SERPL-SCNC: 103 MMOL/L
CO2 SERPL-SCNC: 26 MMOL/L
CREAT SERPL-MCNC: 1.03 MG/DL
EGFR: 98 ML/MIN/1.73M2
GLUCOSE SERPL-MCNC: 111 MG/DL
HCG SERPL-MCNC: <1 MIU/ML
LDH SERPL-CCNC: 267 U/L
POTASSIUM SERPL-SCNC: 4.4 MMOL/L
PROT SERPL-MCNC: 6.6 G/DL
SODIUM SERPL-SCNC: 144 MMOL/L

## 2023-09-27 ENCOUNTER — EMERGENCY (EMERGENCY)
Facility: HOSPITAL | Age: 33
LOS: 1 days | Discharge: HOME | End: 2023-09-27
Attending: STUDENT IN AN ORGANIZED HEALTH CARE EDUCATION/TRAINING PROGRAM
Payer: MEDICAID

## 2023-09-27 VITALS
TEMPERATURE: 98 F | WEIGHT: 287.92 LBS | OXYGEN SATURATION: 97 % | RESPIRATION RATE: 18 BRPM | SYSTOLIC BLOOD PRESSURE: 115 MMHG | DIASTOLIC BLOOD PRESSURE: 83 MMHG | HEART RATE: 89 BPM | HEIGHT: 71 IN

## 2023-09-27 VITALS
TEMPERATURE: 98 F | SYSTOLIC BLOOD PRESSURE: 127 MMHG | RESPIRATION RATE: 18 BRPM | DIASTOLIC BLOOD PRESSURE: 86 MMHG | OXYGEN SATURATION: 96 % | HEART RATE: 78 BPM

## 2023-09-27 DIAGNOSIS — Z90.79 ACQUIRED ABSENCE OF OTHER GENITAL ORGAN(S): Chronic | ICD-10-CM

## 2023-09-27 DIAGNOSIS — Z76.5 MALINGERER [CONSCIOUS SIMULATION]: ICD-10-CM

## 2023-09-27 DIAGNOSIS — F79 UNSPECIFIED INTELLECTUAL DISABILITIES: ICD-10-CM

## 2023-09-27 DIAGNOSIS — F84.0 AUTISTIC DISORDER: ICD-10-CM

## 2023-09-27 LAB
ALBUMIN SERPL ELPH-MCNC: 3.7 G/DL — SIGNIFICANT CHANGE UP (ref 3.5–5)
ALP SERPL-CCNC: 88 U/L — SIGNIFICANT CHANGE UP (ref 40–120)
ALT FLD-CCNC: 68 U/L DA — HIGH (ref 10–60)
AMPHET UR-MCNC: NEGATIVE — SIGNIFICANT CHANGE UP
ANION GAP SERPL CALC-SCNC: 7 MMOL/L — SIGNIFICANT CHANGE UP (ref 5–17)
APAP SERPL-MCNC: <2 UG/ML — LOW (ref 10–30)
APPEARANCE UR: CLEAR — SIGNIFICANT CHANGE UP
AST SERPL-CCNC: 20 U/L — SIGNIFICANT CHANGE UP (ref 10–40)
BACTERIA # UR AUTO: ABNORMAL /HPF
BARBITURATES UR SCN-MCNC: NEGATIVE — SIGNIFICANT CHANGE UP
BASOPHILS # BLD AUTO: 0.04 K/UL — SIGNIFICANT CHANGE UP (ref 0–0.2)
BASOPHILS NFR BLD AUTO: 0.4 % — SIGNIFICANT CHANGE UP (ref 0–2)
BENZODIAZ UR-MCNC: NEGATIVE — SIGNIFICANT CHANGE UP
BILIRUB SERPL-MCNC: 0.3 MG/DL — SIGNIFICANT CHANGE UP (ref 0.2–1.2)
BILIRUB UR-MCNC: NEGATIVE — SIGNIFICANT CHANGE UP
BUN SERPL-MCNC: 14 MG/DL — SIGNIFICANT CHANGE UP (ref 7–18)
CALCIUM SERPL-MCNC: 9.5 MG/DL — SIGNIFICANT CHANGE UP (ref 8.4–10.5)
CHLORIDE SERPL-SCNC: 103 MMOL/L — SIGNIFICANT CHANGE UP (ref 96–108)
CO2 SERPL-SCNC: 28 MMOL/L — SIGNIFICANT CHANGE UP (ref 22–31)
COCAINE METAB.OTHER UR-MCNC: NEGATIVE — SIGNIFICANT CHANGE UP
COLOR SPEC: YELLOW — SIGNIFICANT CHANGE UP
CREAT SERPL-MCNC: 0.96 MG/DL — SIGNIFICANT CHANGE UP (ref 0.5–1.3)
DIFF PNL FLD: NEGATIVE — SIGNIFICANT CHANGE UP
EGFR: 106 ML/MIN/1.73M2 — SIGNIFICANT CHANGE UP
EOSINOPHIL # BLD AUTO: 0.39 K/UL — SIGNIFICANT CHANGE UP (ref 0–0.5)
EOSINOPHIL NFR BLD AUTO: 4.4 % — SIGNIFICANT CHANGE UP (ref 0–6)
EPI CELLS # UR: PRESENT
ETHANOL SERPL-MCNC: <3 MG/DL — SIGNIFICANT CHANGE UP (ref 0–10)
GLUCOSE SERPL-MCNC: 124 MG/DL — HIGH (ref 70–99)
GLUCOSE UR QL: NEGATIVE MG/DL — SIGNIFICANT CHANGE UP
HCT VFR BLD CALC: 44.3 % — SIGNIFICANT CHANGE UP (ref 39–50)
HGB BLD-MCNC: 13.8 G/DL — SIGNIFICANT CHANGE UP (ref 13–17)
IMM GRANULOCYTES NFR BLD AUTO: 1.9 % — HIGH (ref 0–0.9)
KETONES UR-MCNC: ABNORMAL MG/DL
LEUKOCYTE ESTERASE UR-ACNC: NEGATIVE — SIGNIFICANT CHANGE UP
LYMPHOCYTES # BLD AUTO: 3.28 K/UL — SIGNIFICANT CHANGE UP (ref 1–3.3)
LYMPHOCYTES # BLD AUTO: 36.8 % — SIGNIFICANT CHANGE UP (ref 13–44)
MCHC RBC-ENTMCNC: 24.8 PG — LOW (ref 27–34)
MCHC RBC-ENTMCNC: 31.2 GM/DL — LOW (ref 32–36)
MCV RBC AUTO: 79.7 FL — LOW (ref 80–100)
METHADONE UR-MCNC: NEGATIVE — SIGNIFICANT CHANGE UP
MONOCYTES # BLD AUTO: 0.65 K/UL — SIGNIFICANT CHANGE UP (ref 0–0.9)
MONOCYTES NFR BLD AUTO: 7.3 % — SIGNIFICANT CHANGE UP (ref 2–14)
NEUTROPHILS # BLD AUTO: 4.38 K/UL — SIGNIFICANT CHANGE UP (ref 1.8–7.4)
NEUTROPHILS NFR BLD AUTO: 49.2 % — SIGNIFICANT CHANGE UP (ref 43–77)
NITRITE UR-MCNC: NEGATIVE — SIGNIFICANT CHANGE UP
NRBC # BLD: 0 /100 WBCS — SIGNIFICANT CHANGE UP (ref 0–0)
OPIATES UR-MCNC: NEGATIVE — SIGNIFICANT CHANGE UP
PCP SPEC-MCNC: SIGNIFICANT CHANGE UP
PCP UR-MCNC: NEGATIVE — SIGNIFICANT CHANGE UP
PH UR: 5.5 — SIGNIFICANT CHANGE UP (ref 5–8)
PLATELET # BLD AUTO: 236 K/UL — SIGNIFICANT CHANGE UP (ref 150–400)
POTASSIUM SERPL-MCNC: 4.2 MMOL/L — SIGNIFICANT CHANGE UP (ref 3.5–5.3)
POTASSIUM SERPL-SCNC: 4.2 MMOL/L — SIGNIFICANT CHANGE UP (ref 3.5–5.3)
PROT SERPL-MCNC: 6.7 G/DL — SIGNIFICANT CHANGE UP (ref 6–8.3)
PROT UR-MCNC: NEGATIVE MG/DL — SIGNIFICANT CHANGE UP
RBC # BLD: 5.56 M/UL — SIGNIFICANT CHANGE UP (ref 4.2–5.8)
RBC # FLD: 13.7 % — SIGNIFICANT CHANGE UP (ref 10.3–14.5)
RBC CASTS # UR COMP ASSIST: 2 /HPF — SIGNIFICANT CHANGE UP (ref 0–4)
SALICYLATES SERPL-MCNC: <1.7 MG/DL — LOW (ref 2.8–20)
SODIUM SERPL-SCNC: 138 MMOL/L — SIGNIFICANT CHANGE UP (ref 135–145)
SP GR SPEC: 1.01 — SIGNIFICANT CHANGE UP (ref 1–1.03)
THC UR QL: NEGATIVE — SIGNIFICANT CHANGE UP
UROBILINOGEN FLD QL: 0.2 MG/DL — SIGNIFICANT CHANGE UP (ref 0.2–1)
VALPROATE SERPL-MCNC: 52 UG/ML — SIGNIFICANT CHANGE UP (ref 50–100)
WBC # BLD: 8.91 K/UL — SIGNIFICANT CHANGE UP (ref 3.8–10.5)
WBC # FLD AUTO: 8.91 K/UL — SIGNIFICANT CHANGE UP (ref 3.8–10.5)
WBC UR QL: 2 /HPF — SIGNIFICANT CHANGE UP (ref 0–5)

## 2023-09-27 PROCEDURE — 90792 PSYCH DIAG EVAL W/MED SRVCS: CPT | Mod: 95,GC

## 2023-09-27 PROCEDURE — 99285 EMERGENCY DEPT VISIT HI MDM: CPT

## 2023-09-27 PROCEDURE — 80307 DRUG TEST PRSMV CHEM ANLYZR: CPT

## 2023-09-27 PROCEDURE — 80053 COMPREHEN METABOLIC PANEL: CPT

## 2023-09-27 PROCEDURE — 80164 ASSAY DIPROPYLACETIC ACD TOT: CPT

## 2023-09-27 PROCEDURE — 36415 COLL VENOUS BLD VENIPUNCTURE: CPT

## 2023-09-27 PROCEDURE — 81001 URINALYSIS AUTO W/SCOPE: CPT

## 2023-09-27 PROCEDURE — 85025 COMPLETE CBC W/AUTO DIFF WBC: CPT

## 2023-09-27 PROCEDURE — 99284 EMERGENCY DEPT VISIT MOD MDM: CPT

## 2023-09-27 RX ORDER — NITROFURANTOIN MACROCRYSTAL 50 MG
1 CAPSULE ORAL
Refills: 0 | DISCHARGE
Start: 2023-09-27 | End: 2023-10-04

## 2023-09-27 NOTE — ED PROVIDER NOTE - PATIENT PORTAL LINK FT
You can access the FollowMyHealth Patient Portal offered by [facility] by registering at the following website: [email or web address]. By joining [employer]’s FollowMyHealth portal, you will also be able to view your health information using other applications (apps) compatible with our system.

## 2023-09-27 NOTE — ED ADULT NURSE NOTE - NS PRO AD PATIENT TYPE ON CHART
Normal vision: sees adequately in most situations; can see medication labels, newsprint Health Care Proxy (HCP)

## 2023-09-27 NOTE — ED ADULT NURSE NOTE - OBJECTIVE STATEMENT
pt here with a counselor from [facility]. pt denies HI. states "a little bit of SI thoughts sometimes" denies any plans at this time.

## 2023-09-27 NOTE — ED BEHAVIORAL HEALTH NOTE - BEHAVIORAL HEALTH NOTE
===================     PRE-HOSPITAL COURSE     ===================     SOURCE: RN and secondhand ED documentation      DETAILS: BIB EMS     ============     ED COURSE     ============     SOURCE: RN and secondhand ED documentation      ARRIVAL: [facility]     BELONGINGS: No belongings of note. All belongings were provided to hospital security, and patient currently in a gown with a 1:1 staff member.     BEHAVIOR: Per RN pt has been calm, cooperative and in behavioral control. RN reports pt is presenting due to having ‘little thoughts” of SI, no plan or intent. RN reports pt is AOx3, linear thoughts, clear speech, good eye contact and good grooming/hygiene.      TREATMENT: No medications given or required      VISITORS: [facility] staff member at bedside

## 2023-09-27 NOTE — ED PROVIDER NOTE - CLINICAL SUMMARY MEDICAL DECISION MAKING FREE TEXT BOX
Nirali: 33-year-old male with past medical history asthma, diabetes, GERD, BPH, hypothyroidism, hypertension, hyperlipidemia, autism spectrum disorder, impulse control disorder, factitious disorder, generalized anxiety disorder, mood disorder, PTSD, ADHD, multiple past psychiatric admissions presents with suicidal ideations.  Patient reports thoughts of hurting himself, states he wants to cut his own with a knife.  Patient states he was hearing voices telling him to hurt himself.  Denies any substance use.  Denies any fevers, chest pain, shortness of breath, vomiting, numbness, weakness, rash.  Denies any recent injury or trauma.  Patient states he is not taking his medications.  Physical exam per above. Will obtain labs, telepsych consult/recs with dispo pending workup.

## 2023-09-27 NOTE — ED PROVIDER NOTE - NSFOLLOWUPINSTRUCTIONS_ED_ALL_ED_FT
You have been given information necessary to follow up with the  [facility] ([facility]) Crisis center & other outpatient  psychiatric clinics within your community    • [facility] walk in Crisis centre  [address] [place] [address] [phone] [email or web address]-[email or web address]-health-crisis-center  Hours of operation:  Day	                                        Hours  Sunday                                  Closed  Monday                                9am - 3pm  Tuesday                                9am - 3pm  Wednesday                          9am - 3pm  Thursday                               9am - 3pm  Friday                                    9am - 3pm  Saturday                                Closed    .....additionally if your current problem is associated with drug or alcohol abuse further information can be obtained at the Drug Abuse Evaluation Health Referral Servce (DAEHRS)    • [facility] clinic [address], [place] [address] [phone] [email or web address].[facility]/behavioral-health/programs-services/drug-abuse-evaluation-health-referral-service    Additionally if more support and information and help is needed in the area of suicide prevention pleas3 feel free to contact :   • Su[place] [address]  Phone: 5-630-479-MVHK (0116)  Web Address: http://www.suicidepreventionlifeline.org  • Suicide Awareness Voices of Education  [address] [place] [place], [place] [address]  Phone: 1-366.683.5383  Web Address: http://www.[employer].org    Se le ha proporcionado la información necesaria para realizar un seguimiento con el centro de crisis del [facility] ([facility]) y otras clínicas psiquiátricas ambulatorias dentro de hayward comunidad.    • [facility] camina en el centro de crisis [address] [place] [address] [phone] https://[email or web address]/behavioral-health/programs-services/adult-[email or web address]-health-crisis-center  Horas de operación:  Horas del día  Toney cerrado  Lunes 9 am - 3 pm  Zakia 9am - 3pm  Miércoles 9 am - 3 pm  Jueves 9 am - 3 pm  Viernes 9am - 3pm  Sábado cerrado    ..... además, si hayward problema actual está asociado con el abuso de drogas o alcohol, se puede obtener más información en el Servicio de Referencia de [place] de Evaluación de Abuso de Drogas (DAEHRS)    • Clínica D[facility] [address] [place] [address] [phone] https://www.[facility]/behavioral-health/programs-services/drug-abuse-evaluation-health-referral-service    Además, si se necesita más apoyo, información y ayuda en el área de la prevención del suicidio, por favor, no dude en comunicarse con:  • Línea directa para la prevención del [place] [address]  Teléfono: [phone]  Dirección web: http://www.suicidepreventionlifeline.org  • Voces de educación de concienciación sobre el suicidio  [address]  [place], [place] [address]  Teléfono: [phone]  Dirección web: http://www.save.org

## 2023-09-27 NOTE — ED BEHAVIORAL HEALTH ASSESSMENT NOTE - HPI (INCLUDE ILLNESS QUALITY, SEVERITY, DURATION, TIMING, CONTEXT, MODIFYING FACTORS, ASSOCIATED SIGNS AND SYMPTOMS)
Markus is a 33 year-old male, single, disabled, non-caregiver, living at a [facility], with PMHx of asthma, DM, urinary retention, GERD, BPH, hypothyroidism, HLD, HTN, and b/l myopia, with PPHx of moderate ID, ASD, impulse control disorder, factitious disorder, HAVEN, mood disorders, PTSD and ADHD, hx of multiple past psych admissions (last at [facility] 7/13-21/2023), numerous ED visits for both medical/psych complaints, well-known to telepsychiatry for frequent similar presentations, longstanding h/o SIB (head banging, cutting), no known SA, longstanding h/o endorsing SI, h/o aggression toward staff at , on Depakote 500 mg qAM & 1000mg qPM, Guanfacine 2 mg, Risperdal 3 mg BID, and Remeron 30 mg, prescribed by his PCP, who presents to the ED BIB EMS initially with complaint of urinary retention, and then upon arrival changing his complaint to SI "to go to psychiatry". Notably, patient has multiple similar presentations, including most recently on 8/20/23 and 7/24/23. On exam, patient is notably child-like and concrete. He says "I want to go to psychiatry". When asked why he wants to be admitted, he says "I want to move". He goes on to say he does not like his residence. This morning he is denying suicidal ideation, intent or plan. When asked about his report that he's been "stabbing" himself, be points to an old wound on his abdomen. Denies any recent self-injury. He is denying any homicidal/aggressive ideation. Reports being dissatisfied wit his current housing. He denies anhedonia. Talks about how he enjoys music, particularly the artist [employer]. Denies significant neurovegetative symptoms of depression. Denies changes in sleep, appetite, or energy level. Notably, patient ate and has been resting comfortably in the ED. Denies any symptoms concerning for everett/hypomania. Denies AH/VH/paranoid ideation. Denies any other perceptual disturbances. No delusions elicited on exam. Denies significant anxiety symptoms. Denies panic attacks. Denies symptoms consistent with OCD. Denies trauma history or any symptoms consistent with PTSD. Denies nicotine use. Denies marijuana use. Denies any other illicit substance use. Denies EtOH use.    Collateral obtained from .  Reports that pt called for an ambulance after experiencing anxiety, nervousness and stating he wasn't feel well. Reports that patient has been to different hospitals daily this week ([facility]) to get admitted for psych or medical reasons, recently reporting hearing voices and then stating that he had urinary retention. Supervisor has no acute safety concerns at this time and reports patient has a history of similar frequent presentations, making complaints with the intention of getting admitted to psychiatry or medical floors. Supervisor also confirms that at alf setting patient is on a 1:1 at all times and is medication adherent. Supervisor confirms that patient is welcome to return home when cleared. Markus is a 33 year-old male, single, disabled, non-caregiver, living at a [facility], with PMHx of asthma, DM, urinary retention, GERD, BPH, hypothyroidism, HLD, HTN, and b/l myopia, with PPHx of moderate ID, ASD, impulse control disorder, factitious disorder, HAVEN, mood disorders, PTSD and ADHD, hx of multiple past psych admissions (last at [facility] 7/13-21/2023), numerous ED visits for both medical/psych complaints, well-known to telepsychiatry for frequent similar presentations, longstanding h/o SIB (head banging, cutting), no known SA, longstanding h/o endorsing SI, h/o aggression toward staff at , on Depakote 500 mg qAM & 1000mg qPM, Guanfacine 2 mg, Risperdal 3 mg BID, and Remeron 30 mg, prescribed by his PCP, who presents to the ED BIB EMS initially with complaint of urinary retention, and then upon arrival changing his complaint to SI "to go to psychiatry". Notably, patient has multiple similar presentations, including most recently on 8/20/23 and 7/24/23. On exam, patient is notably child-like and concrete. He says "I want to go to psychiatry".     When asked why he wants to be admitted, he says "I want to move". He goes on to say he does not like his residence. This morning he is denying suicidal ideation, intent or plan. When asked about his report that he's been "stabbing" himself, be points to an old wound on his abdomen. Denies any recent self-injury. He is denying any homicidal/aggressive ideation. Reports being dissatisfied wit his current housing. He denies anhedonia. Talks about how he enjoys music, particularly the artist [employer]. Denies significant neurovegetative symptoms of depression. Denies changes in sleep, appetite, or energy level. Notably, patient ate and has been resting comfortably in the ED. Denies any symptoms concerning for everett/hypomania. Denies AH/VH/paranoid ideation. Denies any other perceptual disturbances. No delusions elicited on exam. Denies significant anxiety symptoms. Denies panic attacks. Denies symptoms consistent with OCD. Denies trauma history or any symptoms consistent with PTSD. Denies nicotine use. Denies marijuana use. Denies any other illicit substance use. Denies EtOH use.      states that today he heard a voice telling him to self harm. put the sharp object up to my stomach and wiped it over his abdomen, did not push it in.    states he does not take his medication daily,     I want to go to psych because my medications aren't working     denies NSSIB, suicidal ideation, homicidal ideation, hurting others > knows consequences  no longer hearing the voice  feels safe    will move down the fall to 4J.   things are going well.     Collateral obtained from .  Reports that pt called for an ambulance after experiencing anxiety, nervousness and stating he wasn't feel well. Reports that patient has been to different hospitals daily this week ([facility]) to get admitted for psych or medical reasons, recently reporting hearing voices and then stating that he had urinary retention. Supervisor has no acute safety concerns at this time and reports patient has a history of similar frequent presentations, making complaints with the intention of getting admitted to psychiatry or medical floors. Supervisor also confirms that at [facility] setting patient is on a 1:1 at all times and is medication adherent. Supervisor confirms that patient is welcome to return home when cleared. Markus is a 33 year-old male, single, disabled, non-caregiver, living at a [facility], with PMHx of asthma, DM, urinary retention, GERD, BPH, hypothyroidism, HLD, HTN, and b/l myopia, with PPHx of moderate ID, ASD, impulse control disorder, factitious disorder, HAVEN, mood disorders, PTSD and ADHD, hx of multiple past psych admissions (last at [facility] 7/13-21/2023), numerous ED visits for both medical/psych complaints, well-known to telepsychiatry for frequent similar presentations, longstanding h/o SIB (head banging, cutting), no known SA, longstanding h/o endorsing SI, h/o aggression toward staff at , on Depakote 500 mg qAM & 1000mg qPM, Guanfacine 2 mg, Risperdal 3 mg BID, and Remeron 30 mg, prescribed by his PCP, who presents to the ED BIB EMS after calling for ambulance due to feeling anxious, and then upon arrival changing his complaint to SI "to go to psychiatry". Notably, patient has multiple similar presentations, including most recently on 9/14/23, 8/20/23 and 7/24/23. On exam, patient is notably child-like and concrete. He says "I want to go to psychiatry" for hearing voices.     Patient presents alert and oriented to person, place and time. States he came to the ED due to hearing a voice today that told him to hurt himself. Patient states he took an object from his room which he has been sharpening and swiped it against his stomach. Patient denied pushing the sharp object in to his belly and did not make any marks or cuts. States that at the time of the interview, he was not hearing any voices. Denies thoughts of hurting himself, hurting others, killing self and killing others.     Patient also stated "I want to go to psych" due to not taking his medications daily. States that he does not think his medications are working and therefore has not been taking them. Patient states he has spoken with his psychiatrist about this.     Patient able to recall previous evaluation at [facility]. Denies changes to mental health states since that evaluation, including changes in sleep, appetite, or energy level. Denies any symptoms concerning for everett/hypomania. Denies AVH, paranoid ideation. Denies significant anxiety symptoms. Denies panic attacks. Denies symptoms consistent with OCD. Denies trauma history or any symptoms consistent with PTSD. Denies nicotine use. Denies marijuana use. Denies any other illicit substance use. Denies EtOH use. Reports feeling safe and looking forward to moving to  at the [facility].     Collateral obtained from .  Reports that pt called for an ambulance after experiencing anxiety, nervousness and stating he wasn't feel well. Reports that patient has been to different hospitals daily this week ([facility]) to get admitted for psych or medical reasons, recently reporting hearing voices and then stating that he had urinary retention. Supervisor has no acute safety concerns at this time and reports patient has a history of similar frequent presentations, making complaints with the intention of getting admitted to psychiatry or medical floors. Supervisor also confirms that at [facility] setting patient is on a 1:1 at all times and is medication adherent. Supervisor confirms that patient is welcome to return home when cleared. Markus is a 33 year-old male, single, disabled, non-caregiver, living at a [facility], with PMHx of asthma, DM, urinary retention, GERD, BPH, hypothyroidism, HLD, HTN, and b/l myopia, with PPHx of moderate ID, ASD, impulse control disorder, factitious disorder, HAVEN, mood disorders, PTSD and ADHD, hx of multiple past psych admissions (last at [facility] 7/13-21/2023), numerous ED visits for both medical/psych complaints, well-known to telepsychiatry for frequent similar presentations, longstanding h/o SIB (head banging, cutting), no known SA, longstanding h/o endorsing SI, h/o aggression toward staff at , on Depakote 500 mg qAM & 1000mg qPM, Guanfacine 2 mg, Risperdal 3 mg BID, and Remeron 30 mg, prescribed by his PCP, who presents to the ED BIB EMS after calling for ambulance due to feeling anxious, and then upon arrival changing his complaint to SI "to go to psychiatry". Notably, patient has multiple similar presentations, including most recently on 9/14/23, 8/20/23 and 7/24/23. On exam, patient is notably child-like and concrete. He says "I want to go to psychiatry" for hearing voices.     Patient presents alert and oriented to person, place and time. States he came to the ED due to hearing a voice today that told him to hurt himself. Patient states he took an object from his room which he has been sharpening and swiped it against his stomach. Patient denied pushing the sharp object in to his belly and did not make any marks or cuts. States that at the time of the interview, he was not hearing any voices. Denies thoughts of hurting himself, hurting others, killing self and killing others.     Patient also stated "I want to go to psych" due to not taking his medications daily. States that he does not think his medications are working and therefore has not been taking them. Patient states he has spoken with his psychiatrist about this.     Patient able to recall previous evaluation at [place]. Denies changes to mental health states since that evaluation, including changes in sleep, appetite, or energy level. Denies any symptoms concerning for everett/hypomania. Denies AVH, paranoid ideation. Denies significant anxiety symptoms. Denies panic attacks. Denies symptoms consistent with OCD. Denies trauma history or any symptoms consistent with PTSD. Denies nicotine use. Denies marijuana use. Denies any other illicit substance use. Denies EtOH use. Reports feeling safe and looking forward to moving to  at the [facility].     Pt was evaluated by attending psychiatrist afterward, at which time he denied any current passive or active SI, reports looking forward to moving into his new residence next month, and confirms he will reach out to others for help or return to the ED if he has any acute safety concerns in the future.     Collateral obtained from .  Reports that pt called for an ambulance after experiencing anxiety, nervousness and stating he wasn't feel well. Reports that patient has been to different hospitals daily this week ([facility]) to get admitted for psych or medical reasons, recently reporting hearing voices and then stating that he had urinary retention. Supervisor has no acute safety concerns at this time and reports patient has a history of similar frequent presentations, making complaints with the intention of getting admitted to psychiatry or medical floors. Supervisor also confirms that at [facility] setting patient is on a 1:1 at all times and is medication adherent. Supervisor confirms that patient is welcome to return home when cleared and will send pt to ED in the future for any safety concerns.

## 2023-09-27 NOTE — ED ADULT NURSE NOTE - NSSEPSISSUSPECTED_ED_A_ED
Bedside shift change report given to DEN Batista (oncoming nurse) by Bridgette Dobson RN (offgoing nurse). Report included the following information SBAR.  
 
1000-Pt discharged home with family. Discharge instructions and medication times reviewed. Pt verbalized understanding. Signature obtained on paper and placed on baby's chart. No

## 2023-09-27 NOTE — ED BEHAVIORAL HEALTH ASSESSMENT NOTE - SUMMARY
Markus is a 33 year-old male, single, disabled, non-caregiver, living at a [facility], with PMHx of asthma, DM, urinary retention, GERD, BPH, hypothyroidism, HLD, HTN, and b/l myopia, with PPHx of moderate ID, ASD, impulse control disorder, factitious disorder, HAVEN, mood disorders, PTSD and ADHD, hx of multiple past psych admissions (last at [facility] 7/13-21/2023), numerous ED visits for both medical/psych complaints, well-known to telepsychiatry for frequent similar presentations, longstanding h/o SIB (head banging, cutting), no known SA, longstanding h/o endorsing SI, h/o aggression toward staff at , on Depakote 500 mg qAM & 1000mg qPM, Guanfacine 2 mg, Risperdal 3 mg BID, and Remeron 30 mg, prescribed by his PCP, who presents to the ED BIB EMS Markus is a 33 year-old male, single, disabled, non-caregiver, living at a [facility], with PMHx of asthma, DM, urinary retention, GERD, BPH, hypothyroidism, HLD, HTN, and b/l myopia, with PPHx of moderate ID, ASD, impulse control disorder, factitious disorder, HAVEN, mood disorders, PTSD and ADHD, hx of multiple past psych admissions (last at [facility] 7/13-21/2023), numerous ED visits for both medical/psych complaints, well-known to telepsychiatry for frequent similar presentations, longstanding h/o SIB (head banging, cutting), no known SA, longstanding h/o endorsing SI, h/o aggression toward staff at , on Depakote 500 mg qAM & 1000mg qPM, Guanfacine 2 mg, Risperdal 3 mg BID, and Remeron 30 mg, prescribed by his PCP, who presents to the ED BIB EMS after calling for ambulance due to feeling anxious, and then upon arrival changing his complaint to SI "to go to psychiatry". Notably, patient has multiple similar presentations, including most recently on 9/14/23, 8/20/23 and 7/24/23.    Patient's presentation today is consistent with multiple other similar ED presentations. Patient presents child-like and concrete. Reported hearing voice to self harm but did not inflict any pain or wounds to self. He was unable to state why he did not listen to the voice but at time of interview, he denied feeling anxious, hearing voices and having thoughts of self harm or to hurt others, suicidal ideation, homicidal ideation.    While in ED has been calm, cooperating and not evidencing or endorsing any acutely dangerous psychiatric symptoms or any symptoms consistent with an acute mood or psychotic disorder that would be amenable to inpatient treatment. Patient is also not exhibiting any acutely dangerous behaviors. Collateral obtained from patient's group home is reassuring. Patient is agreeable to returning to residence where he feels safe and was able to review his safety plan form his previous presentation. Presentation is most consistent with diagnoses of moderate intellectual disability, ASD, and malingering.    Plan:  -treat and release

## 2023-09-27 NOTE — ED PROVIDER NOTE - PROGRESS NOTE DETAILS
Nirali: pt seen and re-evaluated at bedside.  Pt states their symptoms have improved.  Pt comfortable in NAD.  Patient cleared for discharge by telepsych. Will DC with outpatient follow up/return precautions.

## 2023-09-27 NOTE — ED BEHAVIORAL HEALTH ASSESSMENT NOTE - NSBHATTESTCOMMENTATTENDFT_PSY_A_CORE
Pt is a 32 yo M, single, disabled, non-caregiver, resides at a [facility], PMH significant for asthma, DM, urinary retention, GERD, BPH, hypothyroidism, HLD, HTN, and b/l myopia, with psych h/o moderate intellectual disability, ASD, unspecified personality disorders, impulse control disorders, conduct disorder, factitious disorder, HAVEN, mood disorders, PTSD and ADHD, multiple past psych admissions (last known [facility] in July 2023), numerous ED visits for both medical/psych complaints, longstanding h/o SIB (head banging, cutting), but no known SA, longstanding h/o endorsing SI and then retracting, h/o aggression toward staff at , on Depakote 500 mg qAM & 1000mg qPM, Guanfacine 2 mg, Risperdal 3 mg BID, and Remeron 30 mg, prescribed by his PCP, who presents to the ED for initally for anxiety and then in the ED expressed SI with the request to be admitted to psychiatry.     The pt's current presentation is consistent with his well documented h/o frequently presenting to the ED with medical/psychiatric complaints with the goal of being admitted to an inpatient psychiatric unit either for secondary gain to avoid returning to his  where he reportedly does not get along with staff or for primary gain to be in the sick role given his previous d/o factitious disorder. His current presentation is consistent with his documented baseline as evidenced by the fact that he provides an inconsistent history, initially endorses vague complaints of SI(which he subsequently retracts), and perseverates on being admitted to an inpatient unit in the setting of feeling frustrated with his living situation. This is further evidenced by the fact that his behavior and affect are inconsistent with his reported SI given he presents as future-oriented and is motivated towards making his needs known and met. In addition, collateral from his  indicates that the pt has not recently expressed plan or intent to kill himself, he has no known suicide attempts, and he has overall been functioning at his baseline. As such, this writer considers the pt to be at his baseline, he does not currently evidence signs or symptoms of an acute psychiatric condition that would potentially benefit with an inpatient admission, and psychiatric hospitalization is not warranted at this time. To mitigate his risk of harm while in the ED, he was engaged in safety planning, was provided resources for outpt psych services, and he was instructed to return to the ED for acute safety concerns in the future. No acute psychiatric contraindications to discharge.

## 2023-09-27 NOTE — ED PROVIDER NOTE - OBJECTIVE STATEMENT
33-year-old male with past medical history asthma, diabetes, GERD, BPH, hypothyroidism, hypertension, hyperlipidemia, autism spectrum disorder, impulse control disorder, factitious disorder, generalized anxiety disorder, mood disorder, PTSD, ADHD, multiple past psychiatric admissions presents with suicidal ideations.  Patient reports thoughts of hurting himself, states he wants to cut his own with a knife.  Patient states he was hearing voices telling him to hurt himself.  Denies any substance use.  Denies any fevers, chest pain, shortness of breath, vomiting, numbness, weakness, rash.  Denies any recent injury or trauma.  Patient states he is not taking his medications.  Denies additional complaints.

## 2023-09-27 NOTE — ED PROVIDER NOTE - PHYSICAL EXAMINATION
CONSTITUTIONAL: non-toxic, well appearing  SKIN: no rash, no petechiae.  EYES: pink conjunctiva, anicteric  NECK: Supple; no meningismus, no JVD  CARD: RRR, no murmurs, equal radial pulses bilaterally 2+  RESP: CTAB, no respiratory distress  ABD: Soft, non-tender, non-distended, no peritoneal signs, no CVA tenderness  EXT: Normal ROM x4. No edema.   NEURO: Alert, oriented. Neuro exam nonfocal  PSYCH: Restricted affect, no signs of internal preoccupation.

## 2023-09-27 NOTE — ED BEHAVIORAL HEALTH ASSESSMENT NOTE - DETAILS
Per chart has prior trauma(details unknown) Per chart has had a rash in response to Zyprexa Per chart has prior trauma (details unknown) ed aware Denies suicidal ideation. completed

## 2023-09-27 NOTE — ED BEHAVIORAL HEALTH ASSESSMENT NOTE - OTHER
Evan  Psychiatry, [facility], , [facility] , [facility], [facility], [facility] CL, [facility], [facility], [facility] Inpatient Psychiatry, [facility] Outpatient Medical, [facility]s, jaylin, google search, [facility] Inpatient Psychiatry, Raquel, [facility] ED with peers - Group Home lives in supervised setting; connected to care ELVER WORKMAN supervisor aid not internally preoccupied limited (chronic) concrete

## 2023-10-01 ENCOUNTER — INPATIENT (INPATIENT)
Facility: HOSPITAL | Age: 33
LOS: 2 days | Discharge: NURSING, REHAB OR LONG-TERM CARE | DRG: 178 | End: 2023-10-04
Attending: STUDENT IN AN ORGANIZED HEALTH CARE EDUCATION/TRAINING PROGRAM | Admitting: STUDENT IN AN ORGANIZED HEALTH CARE EDUCATION/TRAINING PROGRAM
Payer: MEDICAID

## 2023-10-01 VITALS
RESPIRATION RATE: 18 BRPM | HEIGHT: 71 IN | OXYGEN SATURATION: 97 % | DIASTOLIC BLOOD PRESSURE: 90 MMHG | WEIGHT: 289.91 LBS | HEART RATE: 100 BPM | SYSTOLIC BLOOD PRESSURE: 131 MMHG | TEMPERATURE: 99 F

## 2023-10-01 DIAGNOSIS — Z90.79 ACQUIRED ABSENCE OF OTHER GENITAL ORGAN(S): Chronic | ICD-10-CM

## 2023-10-01 LAB — SARS-COV-2 RNA SPEC QL NAA+PROBE: DETECTED

## 2023-10-01 PROCEDURE — 99285 EMERGENCY DEPT VISIT HI MDM: CPT

## 2023-10-01 RX ORDER — ACETAMINOPHEN 500 MG
650 TABLET ORAL ONCE
Refills: 0 | Status: COMPLETED | OUTPATIENT
Start: 2023-10-01 | End: 2023-10-01

## 2023-10-01 RX ADMIN — Medication 650 MILLIGRAM(S): at 23:11

## 2023-10-01 RX ADMIN — Medication 650 MILLIGRAM(S): at 23:45

## 2023-10-02 DIAGNOSIS — E11.9 TYPE 2 DIABETES MELLITUS WITHOUT COMPLICATIONS: ICD-10-CM

## 2023-10-02 DIAGNOSIS — E03.9 HYPOTHYROIDISM, UNSPECIFIED: ICD-10-CM

## 2023-10-02 DIAGNOSIS — U07.1 COVID-19: ICD-10-CM

## 2023-10-02 DIAGNOSIS — F99 MENTAL DISORDER, NOT OTHERWISE SPECIFIED: ICD-10-CM

## 2023-10-02 DIAGNOSIS — C62.90 MALIGNANT NEOPLASM OF UNSPECIFIED TESTIS, UNSPECIFIED WHETHER DESCENDED OR UNDESCENDED: ICD-10-CM

## 2023-10-02 DIAGNOSIS — Z29.9 ENCOUNTER FOR PROPHYLACTIC MEASURES, UNSPECIFIED: ICD-10-CM

## 2023-10-02 LAB
ALBUMIN SERPL ELPH-MCNC: 3.4 G/DL — LOW (ref 3.5–5)
ALP SERPL-CCNC: 87 U/L — SIGNIFICANT CHANGE UP (ref 40–120)
ALT FLD-CCNC: 74 U/L DA — HIGH (ref 10–60)
ANION GAP SERPL CALC-SCNC: 4 MMOL/L — LOW (ref 5–17)
ANION GAP SERPL CALC-SCNC: 8 MMOL/L — SIGNIFICANT CHANGE UP (ref 5–17)
APPEARANCE UR: CLEAR — SIGNIFICANT CHANGE UP
AST SERPL-CCNC: 26 U/L — SIGNIFICANT CHANGE UP (ref 10–40)
BASOPHILS # BLD AUTO: 0.03 K/UL — SIGNIFICANT CHANGE UP (ref 0–0.2)
BASOPHILS NFR BLD AUTO: 0.5 % — SIGNIFICANT CHANGE UP (ref 0–2)
BILIRUB SERPL-MCNC: 0.3 MG/DL — SIGNIFICANT CHANGE UP (ref 0.2–1.2)
BILIRUB UR-MCNC: NEGATIVE — SIGNIFICANT CHANGE UP
BUN SERPL-MCNC: 10 MG/DL — SIGNIFICANT CHANGE UP (ref 7–18)
BUN SERPL-MCNC: 11 MG/DL — SIGNIFICANT CHANGE UP (ref 7–18)
CALCIUM SERPL-MCNC: 8.4 MG/DL — SIGNIFICANT CHANGE UP (ref 8.4–10.5)
CALCIUM SERPL-MCNC: 9 MG/DL — SIGNIFICANT CHANGE UP (ref 8.4–10.5)
CHLORIDE SERPL-SCNC: 104 MMOL/L — SIGNIFICANT CHANGE UP (ref 96–108)
CHLORIDE SERPL-SCNC: 104 MMOL/L — SIGNIFICANT CHANGE UP (ref 96–108)
CO2 SERPL-SCNC: 25 MMOL/L — SIGNIFICANT CHANGE UP (ref 22–31)
CO2 SERPL-SCNC: 29 MMOL/L — SIGNIFICANT CHANGE UP (ref 22–31)
COLOR SPEC: YELLOW — SIGNIFICANT CHANGE UP
CREAT SERPL-MCNC: 0.88 MG/DL — SIGNIFICANT CHANGE UP (ref 0.5–1.3)
CREAT SERPL-MCNC: 0.93 MG/DL — SIGNIFICANT CHANGE UP (ref 0.5–1.3)
DIFF PNL FLD: NEGATIVE — SIGNIFICANT CHANGE UP
EGFR: 110 ML/MIN/1.73M2 — SIGNIFICANT CHANGE UP
EGFR: 116 ML/MIN/1.73M2 — SIGNIFICANT CHANGE UP
EOSINOPHIL # BLD AUTO: 0.38 K/UL — SIGNIFICANT CHANGE UP (ref 0–0.5)
EOSINOPHIL NFR BLD AUTO: 5.7 % — SIGNIFICANT CHANGE UP (ref 0–6)
EPI CELLS # UR: PRESENT
GLUCOSE SERPL-MCNC: 125 MG/DL — HIGH (ref 70–99)
GLUCOSE SERPL-MCNC: 189 MG/DL — HIGH (ref 70–99)
GLUCOSE UR QL: NEGATIVE MG/DL — SIGNIFICANT CHANGE UP
HCT VFR BLD CALC: 40.7 % — SIGNIFICANT CHANGE UP (ref 39–50)
HCT VFR BLD CALC: 42.2 % — SIGNIFICANT CHANGE UP (ref 39–50)
HGB BLD-MCNC: 12.6 G/DL — LOW (ref 13–17)
HGB BLD-MCNC: 13.1 G/DL — SIGNIFICANT CHANGE UP (ref 13–17)
IMM GRANULOCYTES NFR BLD AUTO: 1.4 % — HIGH (ref 0–0.9)
KETONES UR-MCNC: NEGATIVE MG/DL — SIGNIFICANT CHANGE UP
LEUKOCYTE ESTERASE UR-ACNC: NEGATIVE — SIGNIFICANT CHANGE UP
LIDOCAIN IGE QN: 38 U/L — SIGNIFICANT CHANGE UP (ref 13–75)
LYMPHOCYTES # BLD AUTO: 2.1 K/UL — SIGNIFICANT CHANGE UP (ref 1–3.3)
LYMPHOCYTES # BLD AUTO: 31.6 % — SIGNIFICANT CHANGE UP (ref 13–44)
MAGNESIUM SERPL-MCNC: 1.7 MG/DL — SIGNIFICANT CHANGE UP (ref 1.6–2.6)
MCHC RBC-ENTMCNC: 24.7 PG — LOW (ref 27–34)
MCHC RBC-ENTMCNC: 24.8 PG — LOW (ref 27–34)
MCHC RBC-ENTMCNC: 31 GM/DL — LOW (ref 32–36)
MCHC RBC-ENTMCNC: 31 GM/DL — LOW (ref 32–36)
MCV RBC AUTO: 79.8 FL — LOW (ref 80–100)
MCV RBC AUTO: 79.9 FL — LOW (ref 80–100)
MONOCYTES # BLD AUTO: 0.67 K/UL — SIGNIFICANT CHANGE UP (ref 0–0.9)
MONOCYTES NFR BLD AUTO: 10.1 % — SIGNIFICANT CHANGE UP (ref 2–14)
NEUTROPHILS # BLD AUTO: 3.37 K/UL — SIGNIFICANT CHANGE UP (ref 1.8–7.4)
NEUTROPHILS NFR BLD AUTO: 50.7 % — SIGNIFICANT CHANGE UP (ref 43–77)
NITRITE UR-MCNC: NEGATIVE — SIGNIFICANT CHANGE UP
NRBC # BLD: 0 /100 WBCS — SIGNIFICANT CHANGE UP (ref 0–0)
NRBC # BLD: 0 /100 WBCS — SIGNIFICANT CHANGE UP (ref 0–0)
PH UR: 7.5 — SIGNIFICANT CHANGE UP (ref 5–8)
PHOSPHATE SERPL-MCNC: 3.2 MG/DL — SIGNIFICANT CHANGE UP (ref 2.5–4.5)
PLATELET # BLD AUTO: 136 K/UL — LOW (ref 150–400)
PLATELET # BLD AUTO: 196 K/UL — SIGNIFICANT CHANGE UP (ref 150–400)
POTASSIUM SERPL-MCNC: 4.2 MMOL/L — SIGNIFICANT CHANGE UP (ref 3.5–5.3)
POTASSIUM SERPL-MCNC: 4.6 MMOL/L — SIGNIFICANT CHANGE UP (ref 3.5–5.3)
POTASSIUM SERPL-SCNC: 4.2 MMOL/L — SIGNIFICANT CHANGE UP (ref 3.5–5.3)
POTASSIUM SERPL-SCNC: 4.6 MMOL/L — SIGNIFICANT CHANGE UP (ref 3.5–5.3)
PROT SERPL-MCNC: 6.5 G/DL — SIGNIFICANT CHANGE UP (ref 6–8.3)
PROT UR-MCNC: NEGATIVE MG/DL — SIGNIFICANT CHANGE UP
RBC # BLD: 5.1 M/UL — SIGNIFICANT CHANGE UP (ref 4.2–5.8)
RBC # BLD: 5.28 M/UL — SIGNIFICANT CHANGE UP (ref 4.2–5.8)
RBC # FLD: 13.8 % — SIGNIFICANT CHANGE UP (ref 10.3–14.5)
RBC # FLD: 13.9 % — SIGNIFICANT CHANGE UP (ref 10.3–14.5)
RBC CASTS # UR COMP ASSIST: 2 /HPF — SIGNIFICANT CHANGE UP (ref 0–4)
SODIUM SERPL-SCNC: 137 MMOL/L — SIGNIFICANT CHANGE UP (ref 135–145)
SODIUM SERPL-SCNC: 137 MMOL/L — SIGNIFICANT CHANGE UP (ref 135–145)
SP GR SPEC: 1.01 — SIGNIFICANT CHANGE UP (ref 1–1.03)
UROBILINOGEN FLD QL: 0.2 MG/DL — SIGNIFICANT CHANGE UP (ref 0.2–1)
WBC # BLD: 6.19 K/UL — SIGNIFICANT CHANGE UP (ref 3.8–10.5)
WBC # BLD: 6.64 K/UL — SIGNIFICANT CHANGE UP (ref 3.8–10.5)
WBC # FLD AUTO: 6.19 K/UL — SIGNIFICANT CHANGE UP (ref 3.8–10.5)
WBC # FLD AUTO: 6.64 K/UL — SIGNIFICANT CHANGE UP (ref 3.8–10.5)
WBC UR QL: 2 /HPF — SIGNIFICANT CHANGE UP (ref 0–5)

## 2023-10-02 PROCEDURE — 99222 1ST HOSP IP/OBS MODERATE 55: CPT

## 2023-10-02 RX ORDER — ENOXAPARIN SODIUM 100 MG/ML
40 INJECTION SUBCUTANEOUS EVERY 12 HOURS
Refills: 0 | Status: DISCONTINUED | OUTPATIENT
Start: 2023-10-02 | End: 2023-10-04

## 2023-10-02 RX ORDER — ACETAMINOPHEN 500 MG
650 TABLET ORAL EVERY 6 HOURS
Refills: 0 | Status: DISCONTINUED | OUTPATIENT
Start: 2023-10-02 | End: 2023-10-04

## 2023-10-02 RX ORDER — LEVOTHYROXINE SODIUM 125 MCG
50 TABLET ORAL DAILY
Refills: 0 | Status: DISCONTINUED | OUTPATIENT
Start: 2023-10-02 | End: 2023-10-04

## 2023-10-02 RX ORDER — TAMSULOSIN HYDROCHLORIDE 0.4 MG/1
0.4 CAPSULE ORAL AT BEDTIME
Refills: 0 | Status: DISCONTINUED | OUTPATIENT
Start: 2023-10-02 | End: 2023-10-04

## 2023-10-02 RX ORDER — MIRTAZAPINE 45 MG/1
30 TABLET, ORALLY DISINTEGRATING ORAL AT BEDTIME
Refills: 0 | Status: DISCONTINUED | OUTPATIENT
Start: 2023-10-02 | End: 2023-10-03

## 2023-10-02 RX ORDER — RISPERIDONE 4 MG/1
3 TABLET ORAL
Refills: 0 | Status: DISCONTINUED | OUTPATIENT
Start: 2023-10-02 | End: 2023-10-03

## 2023-10-02 RX ORDER — GABAPENTIN 400 MG/1
400 CAPSULE ORAL THREE TIMES A DAY
Refills: 0 | Status: DISCONTINUED | OUTPATIENT
Start: 2023-10-02 | End: 2023-10-03

## 2023-10-02 RX ORDER — PANTOPRAZOLE SODIUM 20 MG/1
40 TABLET, DELAYED RELEASE ORAL
Refills: 0 | Status: DISCONTINUED | OUTPATIENT
Start: 2023-10-02 | End: 2023-10-04

## 2023-10-02 RX ORDER — DIVALPROEX SODIUM 500 MG/1
500 TABLET, DELAYED RELEASE ORAL
Refills: 0 | Status: DISCONTINUED | OUTPATIENT
Start: 2023-10-02 | End: 2023-10-04

## 2023-10-02 RX ORDER — LORATADINE 10 MG/1
10 TABLET ORAL DAILY
Refills: 0 | Status: DISCONTINUED | OUTPATIENT
Start: 2023-10-02 | End: 2023-10-04

## 2023-10-02 RX ORDER — ATORVASTATIN CALCIUM 80 MG/1
10 TABLET, FILM COATED ORAL AT BEDTIME
Refills: 0 | Status: DISCONTINUED | OUTPATIENT
Start: 2023-10-02 | End: 2023-10-04

## 2023-10-02 RX ORDER — HYDROXYZINE HCL 10 MG
50 TABLET ORAL EVERY 6 HOURS
Refills: 0 | Status: DISCONTINUED | OUTPATIENT
Start: 2023-10-02 | End: 2023-10-04

## 2023-10-02 RX ADMIN — DIVALPROEX SODIUM 500 MILLIGRAM(S): 500 TABLET, DELAYED RELEASE ORAL at 07:36

## 2023-10-02 RX ADMIN — TAMSULOSIN HYDROCHLORIDE 0.4 MILLIGRAM(S): 0.4 CAPSULE ORAL at 21:23

## 2023-10-02 RX ADMIN — DIVALPROEX SODIUM 500 MILLIGRAM(S): 500 TABLET, DELAYED RELEASE ORAL at 17:42

## 2023-10-02 RX ADMIN — ENOXAPARIN SODIUM 40 MILLIGRAM(S): 100 INJECTION SUBCUTANEOUS at 17:42

## 2023-10-02 RX ADMIN — PANTOPRAZOLE SODIUM 40 MILLIGRAM(S): 20 TABLET, DELAYED RELEASE ORAL at 07:35

## 2023-10-02 RX ADMIN — GABAPENTIN 400 MILLIGRAM(S): 400 CAPSULE ORAL at 07:34

## 2023-10-02 RX ADMIN — LORATADINE 10 MILLIGRAM(S): 10 TABLET ORAL at 17:35

## 2023-10-02 RX ADMIN — RISPERIDONE 3 MILLIGRAM(S): 4 TABLET ORAL at 17:42

## 2023-10-02 RX ADMIN — ATORVASTATIN CALCIUM 10 MILLIGRAM(S): 80 TABLET, FILM COATED ORAL at 21:22

## 2023-10-02 RX ADMIN — GABAPENTIN 400 MILLIGRAM(S): 400 CAPSULE ORAL at 17:36

## 2023-10-02 RX ADMIN — ENOXAPARIN SODIUM 40 MILLIGRAM(S): 100 INJECTION SUBCUTANEOUS at 07:34

## 2023-10-02 RX ADMIN — GABAPENTIN 400 MILLIGRAM(S): 400 CAPSULE ORAL at 21:22

## 2023-10-02 RX ADMIN — MIRTAZAPINE 30 MILLIGRAM(S): 45 TABLET, ORALLY DISINTEGRATING ORAL at 21:23

## 2023-10-02 RX ADMIN — Medication 50 MICROGRAM(S): at 07:34

## 2023-10-02 RX ADMIN — RISPERIDONE 3 MILLIGRAM(S): 4 TABLET ORAL at 07:35

## 2023-10-02 NOTE — ED ADULT NURSE NOTE - NSFALLUNIVINTERV_ED_ALL_ED
Bed/Stretcher in lowest position, wheels locked, appropriate side rails in place/Call bell, personal items and telephone in reach/Instruct patient to call for assistance before getting out of bed/chair/stretcher/Non-slip footwear applied when patient is off stretcher/[place] to call system/Physically safe environment - no spills, clutter or unnecessary equipment/Purposeful proactive rounding/Room/bathroom lighting operational, light cord in reach

## 2023-10-02 NOTE — ED PROVIDER NOTE - CPE EDP SKIN NORM
Placed call to patient in order to schedule consult with previous [facility] patient for chronic tachycardia; patient advised that his is waiting for some test results and will call back to schedule if/when he needs to. normal...

## 2023-10-02 NOTE — H&P ADULT - PROBLEM SELECTOR PLAN 2
- Patient with history of Autism, Bipolar disorder, intellectual disability, impulse control disorder  - Patient on mutiple psych meds at group home, unable to confirm med rec from patient or group home staff overnight.   - resumed prior medications   - Primary team to please Kindly confirm med rec with patients group home - Patient with history of Autism, Bipolar disorder, intellectual disability, impulse control disorder  - Patient on multiple psych meds at group home, unable to confirm med rec from patient or group home staff overnight.   - resumed prior medications   - Primary team to please Kindly confirm med rec with patients group home

## 2023-10-02 NOTE — ED PROVIDER NOTE - CLINICAL SUMMARY MEDICAL DECISION MAKING FREE TEXT BOX
34-year-old male resident of group home presenting with body aches, sore throat and nonproductive cough.  Symptoms may suggest COVID.  Plan to perform COVID swab and reassess.

## 2023-10-02 NOTE — H&P ADULT - PROBLEM SELECTOR PLAN 5
- Patient with history of Hypothyroidism  - unable to confirm med rec from patient or group home staff overnight.   - resumed prior medications   - Primary team to please Kindly confirm med rec with patients group home  - Resumed Levothyroxine 75mcg

## 2023-10-02 NOTE — ED PROVIDER NOTE - PROGRESS NOTE DETAILS
Discussed with [facility] staff member at bedside states that policy at Psychiatric is that patient COVID-positive cannot return.  This is due to patient's noncompliance with mask policy and risk for infectious disease spread.  We will send labs and consider admission.

## 2023-10-02 NOTE — H&P ADULT - NSHPSOCIALHISTORY_GEN_ALL_CORE
33M from group home.  ambulates independently.  multiple psychataxic diagnosis.  Denies alcohol or tobacco use.  Does state that for ~2 years he vapped however he does not anymore

## 2023-10-02 NOTE — H&P ADULT - PROBLEM SELECTOR PLAN 4
- Patient with history of testicular cancer in b/l testicles   - Patient s/p left testicular removal   - Patient pending right testicular removal on 10/9 at [facility]

## 2023-10-02 NOTE — ED ADULT NURSE NOTE - OBJECTIVE STATEMENT
As per patient c/o flu like symptoms. Pt reports flu like symptoms x2 days. Pt denies any SOB, dyspnea on exertion. No apparent distress noted.

## 2023-10-02 NOTE — H&P ADULT - PROBLEM SELECTOR PLAN 3
- Patient with history of DM  - Patient on insluin at home  - unable to confirm med rec from patient or group home staff overnight.   - resumed prior medications   - Primary team to please Kindly confirm med rec with patients group home  - will start insulin sliding scale  - Finger sticks ACHS  - Diabetic diet - Patient with history of DM  - Patient on insulin at home  - unable to confirm med rec from patient or group home staff overnight.   - resumed prior medications   - Primary team to please Kindly confirm med rec with patients group home  - will start insulin sliding scale  - Finger sticks ACHS  - Diabetic diet

## 2023-10-02 NOTE — PHARMACOTHERAPY INTERVENTION NOTE - COMMENTS
Medication reconciliation done with patient's medication list/MAR sent by ELVER Gutierrez; outpatient medication review updated. See paper chart.

## 2023-10-02 NOTE — ED ADULT TRIAGE NOTE - HOSPITALS/PSYCHIATRIC FACILITIES
Can someone look in my inbox and advise what is needed for this RX?  It is generic so it should be covered       Mary Alice Colón MBA, MS, PA-C  [facility]     [facility] decreased sensation/decreased strength

## 2023-10-02 NOTE — CHART NOTE - NSCHARTNOTEFT_GEN_A_CORE
OBJECTIVE:  Vital Signs Last 24 Hrs  T(C): 36.3 (02 Oct 2023 11:38), Max: 37.1 (01 Oct 2023 21:17)  T(F): 97.4 (02 Oct 2023 11:38), Max: 98.8 (02 Oct 2023 07:31)  HR: 73 (02 Oct 2023 11:38) (73 - 100)  BP: 136/62 (02 Oct 2023 11:38) (131/90 - 136/93)  BP(mean): --  RR: 18 (02 Oct 2023 11:38) (17 - 18)  SpO2: 98% (02 Oct 2023 11:38) (96% - 98%)    Parameters below as of 02 Oct 2023 11:38  Patient On (Oxygen Delivery Method): room air        LABS:                        12.6   6.19  )-----------( 136      ( 02 Oct 2023 10:13 )             40.7     10-02    137  |  104  |  10  ----------------------------<  189<H>  4.2   |  25  |  0.93    Ca    8.4      02 Oct 2023 10:13  Phos  3.2     10-02  Mg     1.7     10-02    TPro  6.5  /  Alb  3.4<L>  /  TBili  0.3  /  DBili  x   /  AST  26  /  ALT  74<H>  /  AlkPhos  87  10-02      EKG:   IMAGING:    ASSESSMENT:  HPI:  Patient is a 33 year old male from [facility] ([phone]) with PMH of DM, BPH, GERD, B/L Testicular cancer (s/p left testicular removal, right testicular removal scheduled for 10/9/23 at [facility]), Hypothyroidism, Autism, Bipolar disorder, and Asthma who prestend to the ED with 2 day history of a cough.  patient states he started coughing yesterday and it continued today and is bothering him.  patient states the cough is non productive and does not cause chest pain or difficulty breathing.  Patient also complains of subjective fever and chills starting this afternoon at the [facility].  Patient denies sick contact.  patient with unknown vaccination status.  patient with multiple past psychiatric admissions / visits.      Of Note:  Patient is being admitted due to Group home not taking him back due to COVID-19.  Spoke with Shahriar Horan, one of the staff from the [facility], who stated he is unsure of the policy at the facility however he belives in about 3 days they should accept him home again.  He provided the address [id number] [id number].  He also advised reaching out to Chiquita ([phone]), one of the [facility] directors, for more information regarding when the patient will be accepted back. (02 Oct 2023 05:32)        PROBLEM: patient resting comfortably, NAD,  verbalized mild nasal congestion. pulse ox 97% on Room air.     PLAN:   Spoke with Chiquita, Director of [facility] who states that patient is noncompliant with mask or staying in room and does have a tendency to elope from home, so their protocol is that he would need to be in the hospital for at least 3 days. Per Chiquita, patient is able to return back to home on Wednesday.

## 2023-10-02 NOTE — H&P ADULT - HISTORY OF PRESENT ILLNESS
Patient is a 33 year old male from [facility] ([phone]) with PMH of DM, BPH, GERD, B/L Testicular cancer (s/p left testicular removal, right testicular removal scheduled for 10/9/23 at [facility]), Hypothyroidism, Autism, Bipolar disorder, and Asthma who prestend to the ED with 2 day history of a cough.  patient states he started coughing yesterday and it continued today and is bothering him.  patient states the cough is non productive and does not cause chest pain or difficulty breathing.  Patient also complains of subjective fever and chills starting this afternoon at the [facility].  Patient denies sick contact.  patient with unknown vaccination status.  patient with multiple past psychiatric admissions / visits.      Of Note:  Patient is being admitted due to Group home not taking him back due to COVID-19.  Spoke with Shahriar Horan, one of the staff from the [facility], who stated he is unsure of the policy at the facility however he belives in about 3 days they should accept him home again.  He provided the address [address] [place] [address].  He also adviced reaching out to Chiquita ([phone]), one of the [facility] directors, for more information regarding when the patient will be accepted back.

## 2023-10-02 NOTE — H&P ADULT - NSHPPHYSICALEXAM_GEN_ALL_CORE
GENERAL: NAD; ambulating during exam; Obese; wearing N95 mask  HEAD:  Atraumatic, Normocephalic  EYES: EOMI, PERRLA,   ENMT: No tonsillar erythema, exudates, or enlargement;   NECK: Supple, normal appearance, No JVD;   NERVOUS SYSTEM:  Alert & Oriented X3,  Motor Strength 5/5 B/L upper and lower extremities, sensation intact  CHEST/LUNG: Lungs clear to auscultation bilaterally, No rales, rhonchi, wheezing; tattoo over anterior right check  HEART: Regular rate and rhythm; No murmurs, rubs, or gallops  ABDOMEN: Soft, Nontender, Nondistended; Bowel sounds present  EXTREMITIES:  2+ Peripheral Pulses, No clubbing, cyanosis, or edema  SKIN: No rashes or lesions;  Good capillary refill

## 2023-10-02 NOTE — ED PROVIDER NOTE - OBJECTIVE STATEMENT
34-year-old male history of asthma, diabetes, GERD, BPH, hypothyroidism, hypertension, hyperlipidemia, autism spectrum disorder, impulse control disorder, factitious disorder, generalized anxiety disorder, mood disorder, PTSD, ADHD, presenting with 2 days of generalized body aches, sore throat and nonproductive cough.  Patient lives at [facility].Sent for further evaluation.  Denies any chest pain or shortness of breath.  Denies any dizziness.

## 2023-10-02 NOTE — H&P ADULT - ATTENDING COMMENTS
Pt seen in the ED  Vital Signs Last 24 Hrs  T(C): 36.5 (01 Oct 2023 23:40), Max: 37.1 (01 Oct 2023 21:17)  T(F): 97.7 (01 Oct 2023 23:40), Max: 98.7 (01 Oct 2023 21:17)  HR: 89 (01 Oct 2023 23:40) (89 - 100)  BP: 136/93 (01 Oct 2023 23:40) (131/90 - 136/93)  RR: 18 (01 Oct 2023 23:40) (18 - 18)  SpO2: 98% (01 Oct 2023 23:40) (97% - 98%)  Parameters below as of 01 Oct 2023 23:40  Patient On (Oxygen Delivery Method): room air     Labs  wbc 6.6 with some left shift  H- 13  MCV - 80  Chem - unremarkable     +ve CoVID 19 PCR    Impression Pt seen in the ED  Vital Signs Last 24 Hrs  T(C): 36.5 (01 Oct 2023 23:40), Max: 37.1 (01 Oct 2023 21:17)  T(F): 97.7 (01 Oct 2023 23:40), Max: 98.7 (01 Oct 2023 21:17)  HR: 89 (01 Oct 2023 23:40) (89 - 100)  BP: 136/93 (01 Oct 2023 23:40) (131/90 - 136/93)  RR: 18 (01 Oct 2023 23:40) (18 - 18)  SpO2: 98% (01 Oct 2023 23:40) (97% - 98%)  Parameters below as of 01 Oct 2023 23:40  Patient On (Oxygen Delivery Method): room air     Labs  wbc 6.6 with some left shift  H- 13  MCV - 80  Chem - unremarkable     +ve CoVID 19 PCR  No CXR       Impression  33 year old man with mental restriction - lives in a group Home with medical hx including DM, BPH, GERD Pt seen in the ED  Vital Signs Last 24 Hrs  T(C): 36.5 (01 Oct 2023 23:40), Max: 37.1 (01 Oct 2023 21:17)  T(F): 97.7 (01 Oct 2023 23:40), Max: 98.7 (01 Oct 2023 21:17)  HR: 89 (01 Oct 2023 23:40) (89 - 100)  BP: 136/93 (01 Oct 2023 23:40) (131/90 - 136/93)  RR: 18 (01 Oct 2023 23:40) (18 - 18)  SpO2: 98% (01 Oct 2023 23:40) (97% - 98%)  Parameters below as of 01 Oct 2023 23:40  Patient On (Oxygen Delivery Method): room air     Labs  wbc 6.6 with some left shift  H- 13  MCV - 80  Chem - unremarkable     +ve CoVID 19 PCR  No CXR       Impression  33 year old man with mental restriction - lives in a group Home with medical hx including DM, BPH, GERD, hypothyroidism, Bipolar affective disorder who is admitted on account of being unable to return to Group Home after he was diagnosed with acute covid 19 infection.   He has no pneumonia or active clinical issues requiring inpatient management .      Plan   - Admit patient  with isolation precaution   - Supportive care   - Social work consult to help with discharge planning   - Medication reconciliation  - Other plans as above

## 2023-10-02 NOTE — ED ADULT NURSE NOTE - ED STAT RN HANDOFF DETAILS
Patient is alert and oriented x3. No sign of acute distress noted. Safety and isolation precautions maintained. Constant observation maintained. Patient is transferred to  in stable condition.

## 2023-10-02 NOTE — H&P ADULT - ASSESSMENT
Patient is a 33 year old male from [facility] ([phone]) with PMH of DM, BPH, GERD, B/L Testicular cancer (s/p left testicular removal, right testicular removal scheduled for 10/9/23 at [facility]), Hypothyroidism, Autism, Bipolar disorder, and Asthma who prestend to the ED with 2 day history of a cough.  Patient was found to be COVID-19 positive in the ED.  Due to his [facility] policy and patient being non complaint with mask wearing while at the [facility] patient is being admitted for COVID-19 infection.

## 2023-10-02 NOTE — H&P ADULT - PROBLEM SELECTOR PLAN 1
- Patient with cough and subjective fever at group home  - Patient hemodynamically stable and afebrile in ED  - Patient tested positive for COVID-19 in ED  - patient with 2 episodes of COVID-19 infections in the past  - Patient saturating well on RA  - No indication for remdesiver or dexamethasone at this time  - Place patient on isolation  - Contact Group home director Chiquita ([phone]) for information regarding discharge planning  - supportive care

## 2023-10-03 ENCOUNTER — TRANSCRIPTION ENCOUNTER (OUTPATIENT)
Age: 33
End: 2023-10-03

## 2023-10-03 DIAGNOSIS — Z02.9 ENCOUNTER FOR ADMINISTRATIVE EXAMINATIONS, UNSPECIFIED: ICD-10-CM

## 2023-10-03 LAB
ANION GAP SERPL CALC-SCNC: 9 MMOL/L — SIGNIFICANT CHANGE UP (ref 5–17)
BUN SERPL-MCNC: 11 MG/DL — SIGNIFICANT CHANGE UP (ref 7–18)
CALCIUM SERPL-MCNC: 9.2 MG/DL — SIGNIFICANT CHANGE UP (ref 8.4–10.5)
CHLORIDE SERPL-SCNC: 105 MMOL/L — SIGNIFICANT CHANGE UP (ref 96–108)
CO2 SERPL-SCNC: 24 MMOL/L — SIGNIFICANT CHANGE UP (ref 22–31)
CREAT SERPL-MCNC: 0.89 MG/DL — SIGNIFICANT CHANGE UP (ref 0.5–1.3)
EGFR: 115 ML/MIN/1.73M2 — SIGNIFICANT CHANGE UP
GLUCOSE BLDC GLUCOMTR-MCNC: 123 MG/DL — HIGH (ref 70–99)
GLUCOSE BLDC GLUCOMTR-MCNC: 139 MG/DL — HIGH (ref 70–99)
GLUCOSE BLDC GLUCOMTR-MCNC: 208 MG/DL — HIGH (ref 70–99)
GLUCOSE BLDC GLUCOMTR-MCNC: 94 MG/DL — SIGNIFICANT CHANGE UP (ref 70–99)
GLUCOSE SERPL-MCNC: 180 MG/DL — HIGH (ref 70–99)
HCT VFR BLD CALC: 41.9 % — SIGNIFICANT CHANGE UP (ref 39–50)
HGB BLD-MCNC: 13.2 G/DL — SIGNIFICANT CHANGE UP (ref 13–17)
MCHC RBC-ENTMCNC: 24.9 PG — LOW (ref 27–34)
MCHC RBC-ENTMCNC: 31.5 GM/DL — LOW (ref 32–36)
MCV RBC AUTO: 79.1 FL — LOW (ref 80–100)
MRSA PCR RESULT.: SIGNIFICANT CHANGE UP
NRBC # BLD: 0 /100 WBCS — SIGNIFICANT CHANGE UP (ref 0–0)
PLATELET # BLD AUTO: 199 K/UL — SIGNIFICANT CHANGE UP (ref 150–400)
POTASSIUM SERPL-MCNC: 4.1 MMOL/L — SIGNIFICANT CHANGE UP (ref 3.5–5.3)
POTASSIUM SERPL-SCNC: 4.1 MMOL/L — SIGNIFICANT CHANGE UP (ref 3.5–5.3)
RBC # BLD: 5.3 M/UL — SIGNIFICANT CHANGE UP (ref 4.2–5.8)
RBC # FLD: 14 % — SIGNIFICANT CHANGE UP (ref 10.3–14.5)
S AUREUS DNA NOSE QL NAA+PROBE: SIGNIFICANT CHANGE UP
SODIUM SERPL-SCNC: 138 MMOL/L — SIGNIFICANT CHANGE UP (ref 135–145)
WBC # BLD: 5.55 K/UL — SIGNIFICANT CHANGE UP (ref 3.8–10.5)
WBC # FLD AUTO: 5.55 K/UL — SIGNIFICANT CHANGE UP (ref 3.8–10.5)

## 2023-10-03 PROCEDURE — 99232 SBSQ HOSP IP/OBS MODERATE 35: CPT

## 2023-10-03 RX ORDER — MIRTAZAPINE 45 MG/1
15 TABLET, ORALLY DISINTEGRATING ORAL AT BEDTIME
Refills: 0 | Status: DISCONTINUED | OUTPATIENT
Start: 2023-10-03 | End: 2023-10-04

## 2023-10-03 RX ORDER — INSULIN LISPRO 100/ML
VIAL (ML) SUBCUTANEOUS
Refills: 0 | Status: DISCONTINUED | OUTPATIENT
Start: 2023-10-03 | End: 2023-10-04

## 2023-10-03 RX ORDER — DEXTROSE 50 % IN WATER 50 %
25 SYRINGE (ML) INTRAVENOUS ONCE
Refills: 0 | Status: DISCONTINUED | OUTPATIENT
Start: 2023-10-03 | End: 2023-10-04

## 2023-10-03 RX ORDER — RISPERIDONE 4 MG/1
4 TABLET ORAL
Refills: 0 | Status: DISCONTINUED | OUTPATIENT
Start: 2023-10-03 | End: 2023-10-04

## 2023-10-03 RX ORDER — INSULIN LISPRO 100/ML
VIAL (ML) SUBCUTANEOUS AT BEDTIME
Refills: 0 | Status: DISCONTINUED | OUTPATIENT
Start: 2023-10-03 | End: 2023-10-04

## 2023-10-03 RX ADMIN — MIRTAZAPINE 15 MILLIGRAM(S): 45 TABLET, ORALLY DISINTEGRATING ORAL at 22:33

## 2023-10-03 RX ADMIN — Medication 50 MICROGRAM(S): at 06:55

## 2023-10-03 RX ADMIN — RISPERIDONE 3 MILLIGRAM(S): 4 TABLET ORAL at 06:57

## 2023-10-03 RX ADMIN — PANTOPRAZOLE SODIUM 40 MILLIGRAM(S): 20 TABLET, DELAYED RELEASE ORAL at 06:55

## 2023-10-03 RX ADMIN — DIVALPROEX SODIUM 500 MILLIGRAM(S): 500 TABLET, DELAYED RELEASE ORAL at 07:39

## 2023-10-03 RX ADMIN — ATORVASTATIN CALCIUM 10 MILLIGRAM(S): 80 TABLET, FILM COATED ORAL at 22:33

## 2023-10-03 RX ADMIN — ENOXAPARIN SODIUM 40 MILLIGRAM(S): 100 INJECTION SUBCUTANEOUS at 17:14

## 2023-10-03 RX ADMIN — GABAPENTIN 400 MILLIGRAM(S): 400 CAPSULE ORAL at 06:55

## 2023-10-03 RX ADMIN — RISPERIDONE 4 MILLIGRAM(S): 4 TABLET ORAL at 17:14

## 2023-10-03 RX ADMIN — LORATADINE 10 MILLIGRAM(S): 10 TABLET ORAL at 12:20

## 2023-10-03 RX ADMIN — ENOXAPARIN SODIUM 40 MILLIGRAM(S): 100 INJECTION SUBCUTANEOUS at 06:54

## 2023-10-03 RX ADMIN — Medication 15 MILLIGRAM(S): at 22:34

## 2023-10-03 RX ADMIN — TAMSULOSIN HYDROCHLORIDE 0.4 MILLIGRAM(S): 0.4 CAPSULE ORAL at 22:33

## 2023-10-03 RX ADMIN — DIVALPROEX SODIUM 500 MILLIGRAM(S): 500 TABLET, DELAYED RELEASE ORAL at 17:14

## 2023-10-03 NOTE — DISCHARGE NOTE PROVIDER - CARE PROVIDER_API CALL
Brie Bui  Internal Medicine  [address] [place] [address]  Phone: [phone]  Fax: [phone]  Established Patient  Follow Up Time:

## 2023-10-03 NOTE — PROGRESS NOTE ADULT - PROBLEM SELECTOR PLAN 3
- Patient with history of DM  - Patient on insulin at home  - unable to confirm med rec from patient or group home staff overnight.   - resumed prior medications   - Primary team to please Kindly confirm med rec with patients group home  - will start insulin sliding scale  - Finger sticks ACHS  - Diabetic diet - insulin sliding scale  - Finger sticks ACHS  - Diabetic diet

## 2023-10-03 NOTE — PROGRESS NOTE ADULT - PROBLEM SELECTOR PLAN 1
- Patient with cough and subjective fever at group home  - Patient hemodynamically stable and afebrile in ED  - Patient tested positive for COVID-19 in ED  - patient with 2 episodes of COVID-19 infections in the past  - Patient saturating well on RA  - No indication for remdesiver or dexamethasone at this time  - Place patient on isolation  - Contact Group home director Chiquita ([phone]) for information regarding discharge planning  - supportive care

## 2023-10-03 NOTE — DISCHARGE NOTE PROVIDER - NSDCFUSCHEDAPPT_GEN_ALL_CORE_FT
[facility]  Scheduled Appointment: 10/05/2023    [facility]  Scheduled Appointment: 10/09/2023    [facility]  Scheduled Appointment: 11/15/2023    [facility]  Scheduled Appointment: 12/13/2023

## 2023-10-03 NOTE — PROGRESS NOTE ADULT - PROBLEM SELECTOR PLAN 5
- Patient with history of Hypothyroidism  - unable to confirm med rec from patient or group home staff overnight.   - resumed prior medications   - Primary team to please Kindly confirm med rec with patients group home  - Resumed Levothyroxine 75mcg continue Levothyroxine 75mcg

## 2023-10-03 NOTE — PATIENT PROFILE ADULT - FALL HARM RISK - HARM RISK INTERVENTIONS

## 2023-10-03 NOTE — DISCHARGE NOTE PROVIDER - NSDCMRMEDTOKEN_GEN_ALL_CORE_FT
atorvastatin 10 mg oral tablet: 1 tab(s) orally once a day (in the evening)  busPIRone 15 mg oral tablet: 1 tab(s) orally once a day (in the evening)  divalproex sodium 500 mg oral tablet, extended release: 1 tab(s) orally 2 times a day at 7 AM and 7 PM  guanFACINE 2 mg oral tablet: 1 tab(s) orally once a day (in the morning)  levothyroxine 50 mcg (0.05 mg) oral tablet: 1 tab(s) orally once a day  loratadine 10 mg oral tablet: 1 tab(s) orally once a day  metFORMIN 1000 mg oral tablet: 1 tab(s) orally 2 times a day (with meals)  mirtazapine 15 mg oral tablet: 1 tab(s) orally once a day (at bedtime)  nitrofurantoin macrocrystals-monohydrate 100 mg oral capsule: 1 cap(s) orally 2 times a day for 7 days  pantoprazole 20 mg oral delayed release tablet: 1 tab(s) orally once a day  risperiDONE 4 mg oral tablet: 1 tab(s) orally 2 times a day at 7 AM and 7 PM  senna leaf extract oral tablet: 2 tab(s) orally once a day (at bedtime)  tamsulosin 0.4 mg oral capsule: 1 cap(s) orally once a day (at bedtime)

## 2023-10-03 NOTE — PROGRESS NOTE ADULT - ASSESSMENT
Patient is a 33 year old male from [facility] ([phone]) with PMH of DM, BPH, GERD, B/L Testicular cancer (s/p left testicular removal, right testicular removal scheduled for 10/9/23 at [facility]), Hypothyroidism, Autism, Bipolar disorder, and Asthma who prestend to the ED with 2 day history of a cough.  Patient was found to be COVID-19 positive in the ED.  Due to his [facility] policy and patient being non complaint with mask wearing while at the [facility] patient is being admitted for COVID-19 infection. Patient is a 33 year old male from [facility] ([phone]) with PMH of DM, BPH, GERD, B/L Testicular cancer (s/p left testicular removal, right testicular removal scheduled for 10/9/23 at [facility]placed by [facility]), Hypothyroidism, Autism, Bipolar disorder, and Asthma who prestend to the ED with 2 day history of a cough.  Patient was found to be COVID-19 positive in the ED.  Due to his [facility] policy and patient being non complaint with mask wearing while at the [facility] patient is being admitted for COVID-19 infection.

## 2023-10-03 NOTE — DISCHARGE NOTE PROVIDER - HOSPITAL COURSE
Patient is a 33 year old male from [facility] ([phone]) with PMH of DM, BPH, GERD, B/L Testicular cancer (s/p left testicular removal, right testicular removal scheduled for 10/9/23 at [facility]), Hypothyroidism, Autism, Bipolar disorder, and Asthma who presented to the ED with 2 day history of a cough.  Patient was found to be COVID-19 positive in the ED.  Due to his [facility] policy and patient being non complaint with mask wearing while at the [facility] patient is being admitted for COVID-19 infection.    Patient to discharged tomorrow back to [facility]. Patient is a 33 year old male from [facility] ([phone]) with PMH of DM, BPH, GERD, B/L Testicular cancer (s/p left testicular removal, right testicular removal scheduled for 10/9/23 at [facility] sale to [facility]), Hypothyroidism, Autism, Bipolar disorder, and Asthma who presented to the ED with 2 day history of a cough.  Patient was found to be COVID-19 positive in the ED.  Due to his [facility] policy and patient being non complaint with mask wearing while at the [facility] patient is being admitted for COVID-19 infection.  Patient remains free of respiratory distress, on room air.   Patient is deemed stable to be discharged back to [facility]. Patient is a 33 year old male from [facility] ([phone]) with PMH of DM, BPH, GERD, B/L Testicular cancer (s/p left testicular removal, right testicular removal scheduled for 10/9/23 at [facility]), Hypothyroidism, Autism, Bipolar disorder, and Asthma who presented to the ED with 2 day history of a cough.  Patient was found to be COVID-19 positive in the ED.  Due to his [facility] policy and patient being non complaint with mask wearing while at the [facility] patient is being admitted for COVID-19 infection.  Patient remains free of respiratory distress, on room air. Patient is deemed stable to be discharged back to [facility].

## 2023-10-03 NOTE — PROGRESS NOTE ADULT - NS ATTEND AMEND GEN_ALL_CORE FT
33 year old man with Autism - lives in [facility] group Home with medical hx of DM, BPH, GERD, hypothyroidism, Bipolar affective disorder, asthma, hx of testicular cancer (s/p L orchiectomy), who is admitted on account of being unable to return to Group Home after he was diagnosed with acute covid 19 infection, currently on room air, no s/s pneumonia. Patient denies sick contacts, with unknown vaccination status.  He has hx of multiple past psychiatric admissions / visits.       Pt was seen walking in the room earlier on, and when asked how he felt states he felt better. When asked about trouble breathing, chest pain, he states no. He asks if he can go back to his group home, but I informed pt that he is quaranting for covid for total of 3 days but likely tomorrow. Otherwise remains afebrile and hemodynamically stable.     #COVID 19+   #Multiple psychiatric disorders   #DM   #Hypothyroidism     -supportive care   -monitor sats on room air  -SW helping with discharge planning   -isolation precautions   -1:1 for elopement risk   -FS/ISS   -c/w levothyroxine, psych medications

## 2023-10-03 NOTE — PROGRESS NOTE ADULT - SUBJECTIVE AND OBJECTIVE BOX
NP Note discussed with  Primary Attending    Patient is a 33y old  Male who presents with a chief complaint of COVID-19 infection (02 Oct 2023 05:32)      INTERVAL HPI/OVERNIGHT EVENTS: no new complaints    MEDICATIONS  (STANDING):  atorvastatin 10 milliGRAM(s) Oral at bedtime  divalproex  milliGRAM(s) Oral two times a day  enoxaparin Injectable 40 milliGRAM(s) SubCutaneous every 12 hours  gabapentin 400 milliGRAM(s) Oral three times a day  levothyroxine 50 MICROGram(s) Oral daily  loratadine 10 milliGRAM(s) Oral daily  mirtazapine 30 milliGRAM(s) Oral at bedtime  pantoprazole    Tablet 40 milliGRAM(s) Oral before breakfast  risperiDONE   Tablet 3 milliGRAM(s) Oral two times a day  tamsulosin 0.4 milliGRAM(s) Oral at bedtime    MEDICATIONS  (PRN):  acetaminophen     Tablet .. 650 milliGRAM(s) Oral every 6 hours PRN Temp greater or equal to 38C (100.4F), Mild Pain (1 - 3)  hydrOXYzine hydrochloride 50 milliGRAM(s) Oral every 6 hours PRN Anxiety      __________________________________________________  REVIEW OF SYSTEMS:    CONSTITUTIONAL: No fever,   EYES: no acute visual disturbances  NECK: No pain or stiffness  RESPIRATORY: No cough; No shortness of breath  CARDIOVASCULAR: No chest pain, no palpitations  GASTROINTESTINAL: No pain. No nausea or vomiting; No diarrhea   NEUROLOGICAL: No headache or numbness, no tremors  MUSCULOSKELETAL: No joint pain, no muscle pain  GENITOURINARY: no dysuria, no frequency, no hesitancy  PSYCHIATRY: no depression , no anxiety  ALL OTHER  ROS negative        Vital Signs Last 24 Hrs  T(C): 36.9 (03 Oct 2023 12:50), Max: 37.1 (03 Oct 2023 05:23)  T(F): 98.4 (03 Oct 2023 12:50), Max: 98.7 (03 Oct 2023 05:23)  HR: 81 (03 Oct 2023 12:50) (75 - 91)  BP: 119/75 (03 Oct 2023 12:50) (108/69 - 125/84)  BP(mean): 94 (03 Oct 2023 03:20) (94 - 94)  RR: 14 (03 Oct 2023 12:50) (14 - 18)  SpO2: 94% (03 Oct 2023 12:50) (94% - 97%)    Parameters below as of 03 Oct 2023 05:23  Patient On (Oxygen Delivery Method): room air        ________________________________________________  PHYSICAL EXAM:  GENERAL: NAD  HEENT: Normocephalic;  conjunctivae and sclerae clear; moist mucous membranes;   NECK : supple  CHEST/LUNG: Clear to auscultation bilaterally with good air entry   HEART: S1 S2  regular; no murmurs, gallops or rubs  ABDOMEN: Soft, Nontender, Nondistended; Bowel sounds present  EXTREMITIES: no cyanosis; no edema; no calf tenderness  SKIN: warm and dry; no rash  NERVOUS SYSTEM:  Awake and alert; Oriented  to place, person and time ; no new deficits    _________________________________________________  LABS:                        13.2   5.55  )-----------( 199      ( 03 Oct 2023 06:49 )             41.9     10-03    138  |  105  |  11  ----------------------------<  180<H>  4.1   |  24  |  0.89    Ca    9.2      03 Oct 2023 06:49  Phos  3.2     10-02  Mg     1.7     10-02    TPro  6.5  /  Alb  3.4<L>  /  TBili  0.3  /  DBili  x   /  AST  26  /  ALT  74<H>  /  AlkPhos  87  10-02      Urinalysis Basic - ( 03 Oct 2023 06:49 )    Color: x / Appearance: x / SG: x / pH: x  Gluc: 180 mg/dL / Ketone: x  / Bili: x / Urobili: x   Blood: x / Protein: x / Nitrite: x   Leuk Esterase: x / RBC: x / WBC x   Sq Epi: x / Non Sq Epi: x / Bacteria: x      CAPILLARY BLOOD GLUCOSE      POCT Blood Glucose.: 139 mg/dL (03 Oct 2023 11:30)  POCT Blood Glucose.: 208 mg/dL (03 Oct 2023 08:14)        RADIOLOGY & ADDITIONAL TESTS:    Imaging  Reviewed:  YES/NO    Consultant(s) Notes Reviewed:   YES/ No      Plan of care was discussed with patient and /or primary care giver; all questions and concerns were addressed

## 2023-10-03 NOTE — DISCHARGE NOTE PROVIDER - NSDCCPCAREPLAN_GEN_ALL_CORE_FT
PRINCIPAL DISCHARGE DIAGNOSIS  Diagnosis: Acute COVID-19  Assessment and Plan of Treatment: You do not have symptoms of COVID. Please follow CDC guidelines regarding COVID infection.  Please continue taking your medication as prescribed. If you have any questions or concerns about your medication please direct them to your prescribing Healthcare Provider.       PRINCIPAL DISCHARGE DIAGNOSIS  Diagnosis: Acute COVID-19  Assessment and Plan of Treatment: - You presented with a two day cough, & found to have Covid.  - You did not require oxygen use while you were in the hospital  - You do not have symptoms of COVID. Please follow CDC guidelines regarding COVID infection.  CORONAVIRUS INSTRUCTIONS:   Based on your current clinical status and stability, it has been determined that you no longer need hospitalization and can recover while remaining in self-quarantine at home. You should follow the prevention steps below until a healthcare provider or local or state health department says you can return to your normal activities.   1. You should restrict activities outside your home, except for getting medical care.   3. Avoid using public transportation, ride-sharing, or taxis.   4. Separate yourself from other people and animals in your home as much as possible.  When you are around other people (e.g., sharing a room or vehicle) you should wear a facemask.  5. Wash your hands often with soap and water for at least 20 seconds, especially after blowing your nose, coughing, or sneezing; going to the bathroom; and before eating or preparing food.  6. Cover your mouth and nose with a tissue when you cough or sneeze. Throw used tissues in a lined trash can. Immediately wash your hands with soap and water for at least 20 seconds  7. High touch surfaces include counters, tabletops, doorknobs, bathroom fixtures, toilets, phones, keyboards, tablets, and bedside tables.  8. Avoid sharing dishes, drinking glasses, cups, eating utensils, towels, or bedding with other people or pets in your home. After using these items, they should be washed thoroughly with soap and water.  You are strongly advised to seek prompt medical attention if your illness worsens or you develop new symptoms like fever or difficulty breathing.

## 2023-10-03 NOTE — PROGRESS NOTE ADULT - PROBLEM SELECTOR PLAN 2
- Patient with history of Autism, Bipolar disorder, intellectual disability, impulse control disorder  - Patient on multiple psych meds at group home, unable to confirm med rec from patient or group home staff overnight.   - resumed prior medications   - Primary team to please Kindly confirm med rec with patients group home - Patient with history of Autism, Bipolar disorder, intellectual disability, impulse control disorder  - Patient on multiple psych meds at group home, unable to confirm med rec from patient or group home staff overnight.   - resumed prior medications   - continue constant observation as patient 1:1 in community

## 2023-10-04 ENCOUNTER — EMERGENCY (EMERGENCY)
Facility: HOSPITAL | Age: 33
LOS: 1 days | Discharge: LEFT BEFORE DISCHARGE | End: 2023-10-04
Attending: STUDENT IN AN ORGANIZED HEALTH CARE EDUCATION/TRAINING PROGRAM
Payer: MEDICAID

## 2023-10-04 ENCOUNTER — TRANSCRIPTION ENCOUNTER (OUTPATIENT)
Age: 33
End: 2023-10-04

## 2023-10-04 VITALS
WEIGHT: 289.91 LBS | SYSTOLIC BLOOD PRESSURE: 127 MMHG | RESPIRATION RATE: 20 BRPM | DIASTOLIC BLOOD PRESSURE: 94 MMHG | TEMPERATURE: 98 F | OXYGEN SATURATION: 95 % | HEART RATE: 106 BPM | HEIGHT: 71 IN

## 2023-10-04 VITALS
DIASTOLIC BLOOD PRESSURE: 97 MMHG | SYSTOLIC BLOOD PRESSURE: 139 MMHG | HEART RATE: 90 BPM | RESPIRATION RATE: 18 BRPM | TEMPERATURE: 98 F | OXYGEN SATURATION: 97 %

## 2023-10-04 DIAGNOSIS — Z90.79 ACQUIRED ABSENCE OF OTHER GENITAL ORGAN(S): Chronic | ICD-10-CM

## 2023-10-04 LAB
GLUCOSE BLDC GLUCOMTR-MCNC: 106 MG/DL — HIGH (ref 70–99)
GLUCOSE BLDC GLUCOMTR-MCNC: 133 MG/DL — HIGH (ref 70–99)

## 2023-10-04 PROCEDURE — 93005 ELECTROCARDIOGRAM TRACING: CPT

## 2023-10-04 PROCEDURE — 36415 COLL VENOUS BLD VENIPUNCTURE: CPT

## 2023-10-04 PROCEDURE — 82962 GLUCOSE BLOOD TEST: CPT

## 2023-10-04 PROCEDURE — 87641 MR-STAPH DNA AMP PROBE: CPT

## 2023-10-04 PROCEDURE — 83690 ASSAY OF LIPASE: CPT

## 2023-10-04 PROCEDURE — 99284 EMERGENCY DEPT VISIT MOD MDM: CPT

## 2023-10-04 PROCEDURE — 83735 ASSAY OF MAGNESIUM: CPT

## 2023-10-04 PROCEDURE — 99285 EMERGENCY DEPT VISIT HI MDM: CPT

## 2023-10-04 PROCEDURE — 99239 HOSP IP/OBS DSCHRG MGMT >30: CPT

## 2023-10-04 PROCEDURE — 84100 ASSAY OF PHOSPHORUS: CPT

## 2023-10-04 PROCEDURE — 71045 X-RAY EXAM CHEST 1 VIEW: CPT | Mod: 26

## 2023-10-04 PROCEDURE — 71045 X-RAY EXAM CHEST 1 VIEW: CPT

## 2023-10-04 PROCEDURE — 87640 STAPH A DNA AMP PROBE: CPT

## 2023-10-04 PROCEDURE — 85027 COMPLETE CBC AUTOMATED: CPT

## 2023-10-04 PROCEDURE — 80048 BASIC METABOLIC PNL TOTAL CA: CPT

## 2023-10-04 PROCEDURE — 81001 URINALYSIS AUTO W/SCOPE: CPT

## 2023-10-04 PROCEDURE — 80053 COMPREHEN METABOLIC PANEL: CPT

## 2023-10-04 PROCEDURE — 87635 SARS-COV-2 COVID-19 AMP PRB: CPT

## 2023-10-04 PROCEDURE — 85025 COMPLETE CBC W/AUTO DIFF WBC: CPT

## 2023-10-04 RX ADMIN — PANTOPRAZOLE SODIUM 40 MILLIGRAM(S): 20 TABLET, DELAYED RELEASE ORAL at 06:43

## 2023-10-04 RX ADMIN — DIVALPROEX SODIUM 500 MILLIGRAM(S): 500 TABLET, DELAYED RELEASE ORAL at 05:35

## 2023-10-04 RX ADMIN — ENOXAPARIN SODIUM 40 MILLIGRAM(S): 100 INJECTION SUBCUTANEOUS at 05:34

## 2023-10-04 RX ADMIN — LORATADINE 10 MILLIGRAM(S): 10 TABLET ORAL at 12:43

## 2023-10-04 RX ADMIN — RISPERIDONE 4 MILLIGRAM(S): 4 TABLET ORAL at 05:34

## 2023-10-04 RX ADMIN — Medication 50 MICROGRAM(S): at 05:35

## 2023-10-04 NOTE — DISCHARGE NOTE NURSING/CASE MANAGEMENT/SOCIAL WORK - NSDCVIVACCINE_GEN_ALL_CORE_FT
Tdap; 20-Dec-2018 12:21; Lo Anaya (RN); [employer]; [id number] (Exp. Date: 02-Nov-2020); IntraMuscular; Deltoid Left.; 0.5 milliLiter(s); VIS (VIS Published: 09-May-2013, VIS Presented: 20-Dec-2018);   Tdap; 26-Mar-2019 18:59; Shavon Barrios (RN); [employer]; [id number] (Exp. Date: 26-Feb-2021); IntraMuscular; Deltoid Left.; 0.5 milliLiter(s); VIS (VIS Published: 09-May-2013, VIS Presented: 26-Mar-2019);   Tdap; 01-Dec-2021 09:35; Zamzam Coulter (RN); [employer]; [id number] (Exp. Date: 09-Sep-2023); IntraMuscular; Deltoid Left.; 0.5 milliLiter(s); VIS (VIS Published: 09-May-2013, VIS Presented: 01-Dec-2021);   Tdap; 21-Jul-2022 01:28; Rose Hancock (RN); [employer]; [id number] (Exp. Date: 14-Oct-2024); IntraMuscular; Deltoid Right.; 0.5 milliLiter(s); VIS (VIS Published: 09-May-2013, VIS Presented: 21-Jul-2022);   Tdap; 12-Jun-2023 21:01; Diann Paul (RN); [employer]; [id number] (Exp. Date: 08-Mar-2025); IntraMuscular; Deltoid Left.; 0.5 milliLiter(s); VIS (VIS Published: 09-May-2013, VIS Presented: 12-Jun-2023);   Tdap; 24-Jul-2023 23:01; Diann Paul (RN); [employer]; [id number] (Exp. Date: 18-Aug-2025); IntraMuscular; Deltoid Right.; 0.5 milliLiter(s); VIS (VIS Published: 09-May-2013, VIS Presented: 24-Jul-2023);

## 2023-10-04 NOTE — ED ADULT TRIAGE NOTE - IDEAL BODY WEIGHT(KG)
LOV: 9/28/18 w/ TB  FOV: 12/21/18   Last labs: 9/13/18 A1C,CMP,VIT B12  Last Refill: Historical    Per protocol sent to provider
74

## 2023-10-04 NOTE — ED ADULT NURSE NOTE - NSFALLUNIVINTERV_ED_ALL_ED
Bed/Stretcher in lowest position, wheels locked, appropriate side rails in place/Call bell, personal items and telephone in reach/Instruct patient to call for assistance before getting out of bed/chair/stretcher/Non-slip footwear applied when patient is off stretcher/[place] to call system/Physically safe environment - no spills, clutter or unnecessary equipment/Purposeful proactive rounding/Room/bathroom lighting operational, light cord in reach

## 2023-10-04 NOTE — DISCHARGE NOTE NURSING/CASE MANAGEMENT/SOCIAL WORK - PATIENT PORTAL LINK FT
You can access the FollowMyHealth Patient Portal offered by [facility] by registering at the following website: [email or web address]. By joining [employer]’s FollowMyHealth portal, you will also be able to view your health information using other applications (apps) compatible with our system.

## 2023-10-04 NOTE — ED ADULT TRIAGE NOTE - NS ED NURSE BANDS TYPE
Allergy; [place] OTOLOGY NEUROTOLOGY        Follow Up TelephoneVisit Note  Feli Espinoza PA-C    On 05/01/2020 at 1:09pm a 24 minute telephone visit was performed.  The patient verbally consented to undergo telephone visit today.     Kimbre understands that we are limiting office visits due to the coronavirus pandemic and she consents to a video visit with charges submitted to her  insurance.     SUBJECTIVE:  This 59 year old woman is seen as a new patient with a chief complaint of hearing loss. She has a history of bilateral otosclerosis and underwent stapedectomy in the right ear years ago with Dr. Moore and underwent left stapedectomy on 7/15/2019 with Dr. Hernandez.  Following surgery the patient demonstrated near complete closure of her air bone gap in her left ear following surgery, but her discrimination score.  Due to this the patient has continued to wear her hearing aids and also wears a pocket talker for hearing assistance.    The patient reports she has not had any worsening of her hearing and in fact feels she may have had some improvement in her hearing since surgery.  In particularly she feels she can understand [place] accents better since her surgery.    The patient works as a teacher and reports she is having hearing difficulty with certain environments and requesting a letter to be sent to her employer noting her impairments and restrictions/accomodations.    The following are the patient's reported difficulties:  The patient can not easily assist with children's musical programs without assistance and also has difficulty using a microphone during the musical programs.  She is requesting accomodation to have assistance with an aid when doing a children's musical program.    The patient has been able to handle a classroom with fewer than 21 students due to the manageable noise level with a classroom of that size.  She has significant difficulties hearing in a classroom with more than 22 students in the  classroom.    The patient has significant difficulty hearing students when they are doing loud group activities.  The patient is requesting accomodation to have assistance with an aid when children are doing loud group activities.    The patient can not put headphones on her head as she can not use headphones with her hearing aids.  One example of this the patient can not check the sound level on the student's computer via the student's headphones when they are doing a computer program.    With phone communication, the patient relies predominantly on her smart phone due to its modern technology including speech to text, transcription, and speaker phone.  The patient is unable to use the school provided landline phone or the answering machine with a landline phone because it can not provide the technology her smartphone gives her.      The patient also uses her smartphone to virtually adjust her hearing aids.  The patient also uses a pocket talker in addition to her hearing aids for assistance with her hearing.    The patient relies a lot of her hearing on lip reading.  She will likely have difficulty hearing others wearing masks because she would be unable to lip read.  Furthermore the patient has difficulty hearing others who are speaking to her from behind.    The patient has difficulty hearing students who are soft spoken, speak with an accent, or have poor annunciation.  Because of this, the patient has found it easier to understand the speech of younger students such as the first grade level and has found it much more difficult to understand the speech of older students such as the fourth grade level and above.  The patient is also able to understand a paraprofessional who communicates well and speaks at an appropriate volume.      The patient has difficulties hearing with a lot of ambient noise, which includes situations where many people are talking at one time.  One example of this occurring is during school  staff meetings.      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  See letter for more information.               ROS:  Reviewed ROS in the nursing note     MEDICATIONS:  Current Outpatient Medications   Medication   • sertraline (ZOLOFT) 50 MG tablet   • atorvastatin (LIPITOR) 10 MG tablet   • ALPRAZolam (XANAX) 0.25 MG tablet   • Magnesium 100 MG Cap   • Multiple Vitamins-Minerals (EYE VITAMINS) Cap   • CALCIUM CARBONATE PO   • albuterol 108 (90 BASE) MCG/ACT inhaler   • aspirin 81 MG tablet   • Naproxen Sodium (ALEVE) 220 MG capsule   • MULTIPLE VITAMINS PO   • Ferrous Sulfate (IRON) 28 MG Tab   • Misc Natural Products (GLUCOSAMINE CHOND COMPLEX/MSM PO)     No current facility-administered medications for this visit.      Allergies   Allergen Reactions   • Penicillins Other (See Comments)     As child      Past Medical History:   Diagnosis Date   • Anxiety    • [place] (hard of hearing)    • Hyperlipidemia    • Negative History of CA, HTN, DM, CAD, CVA, DVT, Asthma    • PMH of     hearing aids   • Wears glasses    • Wears hearing aid in both ears      Past Surgical History:   Procedure Laterality Date   • PAST SURGICAL HISTORY  04/15/11    right ear artificial stapes   • REMOVE TONSILS/ADENOIDS,<10 Y/O      T & A as a child   • US GUIDE BREAST BIOPSY 2 LESIONS RIGHT Right 7/27/2015   • VAGINAL DELIVERY  1994     Family History   Problem Relation Age of Onset   • Diabetes Father    • Hearing Loss Father    • High blood pressure Father    • Diabetes Brother    • Suicide Brother        PERTINENT IMAGING:    No results found for this or any previous visit.       AUDIOLOGY:   Results for orders placed or performed in visit on  08/20/19   HEARING TEST,COMPREHENSIVE    Impression    Aida Alonzo, AUD  8/20/2019 10:52 AM  Sign when Signing Visit  SUBJECTIVE:  Kimber Huerta is seen today at the request of Feli Espinoza PA-C.  Patient's primary complaint is change in hearing after surgery.    Please see the physician's notes for history and results.    OBJECTIVE:     Otoscopy: clear ear canals bilaterally    AUDIOMETRIC RESULTS    Acoustic Immittance:          Right ear: Restricted eardrum mobility consistent with outer/middle   ear involvement         Left ear: Restricted eardrum mobility consistent with outer/middle   ear involvement    Audiogram: See scanned report.  Right ear: moderate-severe  sensorineural hearing loss.  Left ear: moderate-severe   sensorineural hearing loss.    Speech Audiometry:  Speech Reception Thresholds:  60 dB right ear and 55 dB left ear.  Word Recognition Test Scores:  80 % right ear when presented at 90 dB HL   and 56 % left ear when presented at 75 dB HL.      RECOMMENDATIONS:  These results will be communicated by Troy Hernandez M.D.              Prior Laboratory Testing:  No recent labs found.    Feli Espinoza PA-C  A total of 15 minutes was spent creating my note.  A total of 10 minutes was spent reviewing the patient's chart prior to the appointment.  A total of 33  minutes was spent on the telephone encounter.  The total encounter was 58 minutes long.

## 2023-10-04 NOTE — ED ADULT TRIAGE NOTE - CHIEF COMPLAINT QUOTE
PT COVID POSITIVE 10/1/23. REPORTS DIZZINESS AND GENERALIZED WEAKNESS SINCE 4 PM. PT WAS D/C TODAY FROM HOSPITAL

## 2023-10-04 NOTE — ED ADULT NURSE REASSESSMENT NOTE - NS ED NURSE REASSESS COMMENT FT1
[facility] called regarding patient elopement. As per director, pt feeling better and is back in [facility] group home safely with supervised  Elroy. Supervisor Brock made aware and charge nurse Bradford.

## 2023-10-09 ENCOUNTER — EMERGENCY (EMERGENCY)
Facility: HOSPITAL | Age: 33
LOS: 1 days | Discharge: HOME | End: 2023-10-09
Attending: STUDENT IN AN ORGANIZED HEALTH CARE EDUCATION/TRAINING PROGRAM
Payer: MEDICAID

## 2023-10-09 VITALS
TEMPERATURE: 98 F | SYSTOLIC BLOOD PRESSURE: 114 MMHG | DIASTOLIC BLOOD PRESSURE: 77 MMHG | WEIGHT: 201.06 LBS | RESPIRATION RATE: 18 BRPM | OXYGEN SATURATION: 95 % | HEART RATE: 106 BPM | HEIGHT: 71 IN

## 2023-10-09 VITALS — HEART RATE: 87 BPM

## 2023-10-09 DIAGNOSIS — Z90.79 ACQUIRED ABSENCE OF OTHER GENITAL ORGAN(S): Chronic | ICD-10-CM

## 2023-10-09 LAB
ALBUMIN SERPL ELPH-MCNC: 3.3 G/DL — LOW (ref 3.5–5)
ALP SERPL-CCNC: 91 U/L — SIGNIFICANT CHANGE UP (ref 40–120)
ALT FLD-CCNC: 70 U/L DA — HIGH (ref 10–60)
ANION GAP SERPL CALC-SCNC: 5 MMOL/L — SIGNIFICANT CHANGE UP (ref 5–17)
AST SERPL-CCNC: 21 U/L — SIGNIFICANT CHANGE UP (ref 10–40)
BASOPHILS # BLD AUTO: 0 K/UL — SIGNIFICANT CHANGE UP (ref 0–0.2)
BASOPHILS NFR BLD AUTO: 0 % — SIGNIFICANT CHANGE UP (ref 0–2)
BILIRUB SERPL-MCNC: 0.2 MG/DL — SIGNIFICANT CHANGE UP (ref 0.2–1.2)
BUN SERPL-MCNC: 14 MG/DL — SIGNIFICANT CHANGE UP (ref 7–18)
CALCIUM SERPL-MCNC: 9.3 MG/DL — SIGNIFICANT CHANGE UP (ref 8.4–10.5)
CHLORIDE SERPL-SCNC: 104 MMOL/L — SIGNIFICANT CHANGE UP (ref 96–108)
CO2 SERPL-SCNC: 28 MMOL/L — SIGNIFICANT CHANGE UP (ref 22–31)
CREAT SERPL-MCNC: 0.96 MG/DL — SIGNIFICANT CHANGE UP (ref 0.5–1.3)
D DIMER BLD IA.RAPID-MCNC: <150 NG/ML DDU — SIGNIFICANT CHANGE UP
EGFR: 106 ML/MIN/1.73M2 — SIGNIFICANT CHANGE UP
EOSINOPHIL # BLD AUTO: 0.39 K/UL — SIGNIFICANT CHANGE UP (ref 0–0.5)
EOSINOPHIL NFR BLD AUTO: 5 % — SIGNIFICANT CHANGE UP (ref 0–6)
GLUCOSE SERPL-MCNC: 128 MG/DL — HIGH (ref 70–99)
HCT VFR BLD CALC: 42.3 % — SIGNIFICANT CHANGE UP (ref 39–50)
HGB BLD-MCNC: 13.5 G/DL — SIGNIFICANT CHANGE UP (ref 13–17)
LYMPHOCYTES # BLD AUTO: 2.75 K/UL — SIGNIFICANT CHANGE UP (ref 1–3.3)
LYMPHOCYTES # BLD AUTO: 35 % — SIGNIFICANT CHANGE UP (ref 13–44)
MANUAL SMEAR VERIFICATION: SIGNIFICANT CHANGE UP
MCHC RBC-ENTMCNC: 25.1 PG — LOW (ref 27–34)
MCHC RBC-ENTMCNC: 31.9 GM/DL — LOW (ref 32–36)
MCV RBC AUTO: 78.6 FL — LOW (ref 80–100)
METAMYELOCYTES # FLD: 1 % — HIGH (ref 0–0)
MONOCYTES # BLD AUTO: 0.31 K/UL — SIGNIFICANT CHANGE UP (ref 0–0.9)
MONOCYTES NFR BLD AUTO: 4 % — SIGNIFICANT CHANGE UP (ref 2–14)
NEUTROPHILS # BLD AUTO: 4.32 K/UL — SIGNIFICANT CHANGE UP (ref 1.8–7.4)
NEUTROPHILS NFR BLD AUTO: 54 % — SIGNIFICANT CHANGE UP (ref 43–77)
NEUTS BAND # BLD: 1 % — SIGNIFICANT CHANGE UP (ref 0–8)
NRBC # BLD: 0 /100 — SIGNIFICANT CHANGE UP (ref 0–0)
PLAT MORPH BLD: NORMAL — SIGNIFICANT CHANGE UP
PLATELET # BLD AUTO: 226 K/UL — SIGNIFICANT CHANGE UP (ref 150–400)
POTASSIUM SERPL-MCNC: 4.7 MMOL/L — SIGNIFICANT CHANGE UP (ref 3.5–5.3)
POTASSIUM SERPL-SCNC: 4.7 MMOL/L — SIGNIFICANT CHANGE UP (ref 3.5–5.3)
PROT SERPL-MCNC: 6.6 G/DL — SIGNIFICANT CHANGE UP (ref 6–8.3)
RBC # BLD: 5.38 M/UL — SIGNIFICANT CHANGE UP (ref 4.2–5.8)
RBC # FLD: 14 % — SIGNIFICANT CHANGE UP (ref 10.3–14.5)
RBC BLD AUTO: NORMAL — SIGNIFICANT CHANGE UP
SODIUM SERPL-SCNC: 137 MMOL/L — SIGNIFICANT CHANGE UP (ref 135–145)
TROPONIN I, HIGH SENSITIVITY RESULT: 7.7 NG/L — SIGNIFICANT CHANGE UP
WBC # BLD: 7.86 K/UL — SIGNIFICANT CHANGE UP (ref 3.8–10.5)
WBC # FLD AUTO: 7.86 K/UL — SIGNIFICANT CHANGE UP (ref 3.8–10.5)

## 2023-10-09 PROCEDURE — 71046 X-RAY EXAM CHEST 2 VIEWS: CPT

## 2023-10-09 PROCEDURE — 85025 COMPLETE CBC W/AUTO DIFF WBC: CPT

## 2023-10-09 PROCEDURE — 36415 COLL VENOUS BLD VENIPUNCTURE: CPT

## 2023-10-09 PROCEDURE — 71046 X-RAY EXAM CHEST 2 VIEWS: CPT | Mod: 26

## 2023-10-09 PROCEDURE — 84484 ASSAY OF TROPONIN QUANT: CPT

## 2023-10-09 PROCEDURE — 99285 EMERGENCY DEPT VISIT HI MDM: CPT | Mod: 25

## 2023-10-09 PROCEDURE — 93005 ELECTROCARDIOGRAM TRACING: CPT

## 2023-10-09 PROCEDURE — 80053 COMPREHEN METABOLIC PANEL: CPT

## 2023-10-09 PROCEDURE — 85379 FIBRIN DEGRADATION QUANT: CPT

## 2023-10-09 PROCEDURE — 99285 EMERGENCY DEPT VISIT HI MDM: CPT

## 2023-10-09 RX ORDER — ACETAMINOPHEN 500 MG
975 TABLET ORAL ONCE
Refills: 0 | Status: DISCONTINUED | OUTPATIENT
Start: 2023-10-09 | End: 2023-10-13

## 2023-10-09 RX ORDER — IBUPROFEN 200 MG
600 TABLET ORAL ONCE
Refills: 0 | Status: DISCONTINUED | OUTPATIENT
Start: 2023-10-09 | End: 2023-10-13

## 2023-10-09 RX ORDER — DEXAMETHASONE 0.5 MG/5ML
4 ELIXIR ORAL ONCE
Refills: 0 | Status: DISCONTINUED | OUTPATIENT
Start: 2023-10-09 | End: 2023-10-13

## 2023-10-09 NOTE — ED ADULT NURSE NOTE - AGENT'S NAME
Liz Wakefield Finasteride Pregnancy And Lactation Text: This medication is absolutely contraindicated during pregnancy. It is unknown if it is excreted in breast milk.

## 2023-10-09 NOTE — ED ADULT NURSE NOTE - NSFALLUNIVINTERV_ED_ALL_ED
Bed/Stretcher in lowest position, wheels locked, appropriate side rails in place/Call bell, personal items and telephone in reach/Instruct patient to call for assistance before getting out of bed/chair/stretcher/Non-slip footwear applied when patient is off stretcher/[place] to call system/Physically safe environment - no spills, clutter or unnecessary equipment/Purposeful proactive rounding/Room/bathroom lighting operational, light cord in reach

## 2023-10-09 NOTE — ED PROVIDER NOTE - NSFOLLOWUPINSTRUCTIONS_ED_ALL_ED_FT
You were seen in the emergency department for chest pain and shortness of breath.     Please follow-up with your primary care doctor.     If you have any worsening symptoms, severe chest pain, trouble breathing, please return to the emergency department.

## 2023-10-09 NOTE — ED PROVIDER NOTE - OBJECTIVE STATEMENT
33M, pmh of [facility] ([phone]) with PMH of DM, BPH, GERD, B/L Testicular cancer (s/p left testicular removal, right testicular removal scheduled for 10/9/23 at [facility]), Hypothyroidism, Autism, Bipolar disorder, and Asthma, presenting with chest pain and trouble breathing. patient reports he was recently discharged after being treated for Covid-19. reports his symptoms are getting worse and has chest pain and trouble breathing that is worse when he walks.

## 2023-10-09 NOTE — ED PROVIDER NOTE - PATIENT PORTAL LINK FT
You can access the FollowMyHealth Patient Portal offered by [facility] by registering at the following website: [email or web address]. By joining [employer]’s FollowMyHealth portal, you will also be able to view your health information using other applications (apps) compatible with our system.

## 2023-10-09 NOTE — ED PROVIDER NOTE - CLINICAL SUMMARY MEDICAL DECISION MAKING FREE TEXT BOX
33M presenting with chest pain and trouble breathing. patient breathing comfortably on room air. low concern for PE as patient is well known to this ED and is presenting with his typical complaints, however, cannot PERC out because he is tachycardic. will get labs, cxr, ekg. will reassess.

## 2023-10-09 NOTE — ED PROVIDER NOTE - EKG ADDITIONAL INFORMATION FREE TEXT
Nephrology Focus Note:     ESRD on PD    Discussed with ID about PD cell count and negative cultures so far.     No current signs of PD peritonitis    Plan:   Can stop Abx for peritonitis   Continue PD 2.5%, 2,5L x4    Amaury Griffiths MD  Nephrology    sinus tachycardia, rate 103, , QRS 76, QT/QTc 322/421. no acute ischemic changes.

## 2023-10-13 ENCOUNTER — EMERGENCY (EMERGENCY)
Facility: HOSPITAL | Age: 33
LOS: 1 days | Discharge: LEFT BEFORE DISCHARGE | End: 2023-10-13
Payer: MEDICAID

## 2023-10-13 VITALS
SYSTOLIC BLOOD PRESSURE: 134 MMHG | TEMPERATURE: 98 F | RESPIRATION RATE: 18 BRPM | OXYGEN SATURATION: 97 % | HEART RATE: 96 BPM | HEIGHT: 71 IN | DIASTOLIC BLOOD PRESSURE: 89 MMHG | WEIGHT: 289.91 LBS

## 2023-10-13 DIAGNOSIS — Z90.79 ACQUIRED ABSENCE OF OTHER GENITAL ORGAN(S): Chronic | ICD-10-CM

## 2023-10-13 PROCEDURE — L9991: CPT

## 2023-10-13 NOTE — ED PROVIDER NOTE - COVID-19 ORDERING FACILITY
[facility]
Normal vision: sees adequately in most situations; can see medication labels, newsprint

## 2023-10-25 NOTE — ED ADULT NURSE NOTE - NSPATIENTFLAG_GEN_A_ER
Activity as tolerated, in moderation so no shortness of breath.  
Purple DH (Discharge Huddle; Vulnerable Patient)

## 2023-10-26 NOTE — ED BEHAVIORAL HEALTH ASSESSMENT NOTE - AXIS III
n/a
acute; urinary retention (can not rule out volitional withholding by pt to provoke inpt med admission)  chronic; asthma, HTN, dyslipidemia, DM, enuresis, GERD, BPH, hypothyroidism and b/l myopia

## 2023-11-04 ENCOUNTER — EMERGENCY (EMERGENCY)
Facility: HOSPITAL | Age: 33
LOS: 1 days | Discharge: HOME | End: 2023-11-04
Attending: EMERGENCY MEDICINE
Payer: MEDICAID

## 2023-11-04 VITALS
OXYGEN SATURATION: 96 % | DIASTOLIC BLOOD PRESSURE: 74 MMHG | TEMPERATURE: 98 F | HEART RATE: 86 BPM | SYSTOLIC BLOOD PRESSURE: 110 MMHG | RESPIRATION RATE: 16 BRPM | HEIGHT: 71 IN

## 2023-11-04 DIAGNOSIS — Z90.79 ACQUIRED ABSENCE OF OTHER GENITAL ORGAN(S): Chronic | ICD-10-CM

## 2023-11-04 LAB
ANION GAP SERPL CALC-SCNC: 3 MMOL/L — LOW (ref 5–17)
ANION GAP SERPL CALC-SCNC: 3 MMOL/L — LOW (ref 5–17)
APAP SERPL-MCNC: <2 UG/ML — LOW (ref 10–30)
APAP SERPL-MCNC: <2 UG/ML — LOW (ref 10–30)
BASOPHILS # BLD AUTO: 0.03 K/UL — SIGNIFICANT CHANGE UP (ref 0–0.2)
BASOPHILS # BLD AUTO: 0.03 K/UL — SIGNIFICANT CHANGE UP (ref 0–0.2)
BASOPHILS NFR BLD AUTO: 0.4 % — SIGNIFICANT CHANGE UP (ref 0–2)
BASOPHILS NFR BLD AUTO: 0.4 % — SIGNIFICANT CHANGE UP (ref 0–2)
BUN SERPL-MCNC: 13 MG/DL — SIGNIFICANT CHANGE UP (ref 7–18)
BUN SERPL-MCNC: 13 MG/DL — SIGNIFICANT CHANGE UP (ref 7–18)
CALCIUM SERPL-MCNC: 8.2 MG/DL — LOW (ref 8.4–10.5)
CALCIUM SERPL-MCNC: 8.2 MG/DL — LOW (ref 8.4–10.5)
CHLORIDE SERPL-SCNC: 107 MMOL/L — SIGNIFICANT CHANGE UP (ref 96–108)
CHLORIDE SERPL-SCNC: 107 MMOL/L — SIGNIFICANT CHANGE UP (ref 96–108)
CO2 SERPL-SCNC: 31 MMOL/L — SIGNIFICANT CHANGE UP (ref 22–31)
CO2 SERPL-SCNC: 31 MMOL/L — SIGNIFICANT CHANGE UP (ref 22–31)
CREAT SERPL-MCNC: 0.86 MG/DL — SIGNIFICANT CHANGE UP (ref 0.5–1.3)
CREAT SERPL-MCNC: 0.86 MG/DL — SIGNIFICANT CHANGE UP (ref 0.5–1.3)
EGFR: 117 ML/MIN/1.73M2 — SIGNIFICANT CHANGE UP
EGFR: 117 ML/MIN/1.73M2 — SIGNIFICANT CHANGE UP
EOSINOPHIL # BLD AUTO: 0.28 K/UL — SIGNIFICANT CHANGE UP (ref 0–0.5)
EOSINOPHIL # BLD AUTO: 0.28 K/UL — SIGNIFICANT CHANGE UP (ref 0–0.5)
EOSINOPHIL NFR BLD AUTO: 3.7 % — SIGNIFICANT CHANGE UP (ref 0–6)
EOSINOPHIL NFR BLD AUTO: 3.7 % — SIGNIFICANT CHANGE UP (ref 0–6)
ETHANOL SERPL-MCNC: <3 MG/DL — SIGNIFICANT CHANGE UP (ref 0–10)
ETHANOL SERPL-MCNC: <3 MG/DL — SIGNIFICANT CHANGE UP (ref 0–10)
GLUCOSE SERPL-MCNC: 150 MG/DL — HIGH (ref 70–99)
GLUCOSE SERPL-MCNC: 150 MG/DL — HIGH (ref 70–99)
HCT VFR BLD CALC: 38.2 % — LOW (ref 39–50)
HCT VFR BLD CALC: 38.2 % — LOW (ref 39–50)
HGB BLD-MCNC: 11.8 G/DL — LOW (ref 13–17)
HGB BLD-MCNC: 11.8 G/DL — LOW (ref 13–17)
IMM GRANULOCYTES NFR BLD AUTO: 1.2 % — HIGH (ref 0–0.9)
IMM GRANULOCYTES NFR BLD AUTO: 1.2 % — HIGH (ref 0–0.9)
LYMPHOCYTES # BLD AUTO: 2.81 K/UL — SIGNIFICANT CHANGE UP (ref 1–3.3)
LYMPHOCYTES # BLD AUTO: 2.81 K/UL — SIGNIFICANT CHANGE UP (ref 1–3.3)
LYMPHOCYTES # BLD AUTO: 37.2 % — SIGNIFICANT CHANGE UP (ref 13–44)
LYMPHOCYTES # BLD AUTO: 37.2 % — SIGNIFICANT CHANGE UP (ref 13–44)
MCHC RBC-ENTMCNC: 24.7 PG — LOW (ref 27–34)
MCHC RBC-ENTMCNC: 24.7 PG — LOW (ref 27–34)
MCHC RBC-ENTMCNC: 30.9 GM/DL — LOW (ref 32–36)
MCHC RBC-ENTMCNC: 30.9 GM/DL — LOW (ref 32–36)
MCV RBC AUTO: 79.9 FL — LOW (ref 80–100)
MCV RBC AUTO: 79.9 FL — LOW (ref 80–100)
MONOCYTES # BLD AUTO: 0.65 K/UL — SIGNIFICANT CHANGE UP (ref 0–0.9)
MONOCYTES # BLD AUTO: 0.65 K/UL — SIGNIFICANT CHANGE UP (ref 0–0.9)
MONOCYTES NFR BLD AUTO: 8.6 % — SIGNIFICANT CHANGE UP (ref 2–14)
MONOCYTES NFR BLD AUTO: 8.6 % — SIGNIFICANT CHANGE UP (ref 2–14)
NEUTROPHILS # BLD AUTO: 3.69 K/UL — SIGNIFICANT CHANGE UP (ref 1.8–7.4)
NEUTROPHILS # BLD AUTO: 3.69 K/UL — SIGNIFICANT CHANGE UP (ref 1.8–7.4)
NEUTROPHILS NFR BLD AUTO: 48.9 % — SIGNIFICANT CHANGE UP (ref 43–77)
NEUTROPHILS NFR BLD AUTO: 48.9 % — SIGNIFICANT CHANGE UP (ref 43–77)
NRBC # BLD: 0 /100 WBCS — SIGNIFICANT CHANGE UP (ref 0–0)
NRBC # BLD: 0 /100 WBCS — SIGNIFICANT CHANGE UP (ref 0–0)
PLATELET # BLD AUTO: 217 K/UL — SIGNIFICANT CHANGE UP (ref 150–400)
PLATELET # BLD AUTO: 217 K/UL — SIGNIFICANT CHANGE UP (ref 150–400)
POTASSIUM SERPL-MCNC: 4.5 MMOL/L — SIGNIFICANT CHANGE UP (ref 3.5–5.3)
POTASSIUM SERPL-MCNC: 4.5 MMOL/L — SIGNIFICANT CHANGE UP (ref 3.5–5.3)
POTASSIUM SERPL-SCNC: 4.5 MMOL/L — SIGNIFICANT CHANGE UP (ref 3.5–5.3)
POTASSIUM SERPL-SCNC: 4.5 MMOL/L — SIGNIFICANT CHANGE UP (ref 3.5–5.3)
RBC # BLD: 4.78 M/UL — SIGNIFICANT CHANGE UP (ref 4.2–5.8)
RBC # BLD: 4.78 M/UL — SIGNIFICANT CHANGE UP (ref 4.2–5.8)
RBC # FLD: 14.2 % — SIGNIFICANT CHANGE UP (ref 10.3–14.5)
RBC # FLD: 14.2 % — SIGNIFICANT CHANGE UP (ref 10.3–14.5)
SALICYLATES SERPL-MCNC: <1.7 MG/DL — LOW (ref 2.8–20)
SALICYLATES SERPL-MCNC: <1.7 MG/DL — LOW (ref 2.8–20)
SARS-COV-2 RNA SPEC QL NAA+PROBE: SIGNIFICANT CHANGE UP
SARS-COV-2 RNA SPEC QL NAA+PROBE: SIGNIFICANT CHANGE UP
SODIUM SERPL-SCNC: 141 MMOL/L — SIGNIFICANT CHANGE UP (ref 135–145)
SODIUM SERPL-SCNC: 141 MMOL/L — SIGNIFICANT CHANGE UP (ref 135–145)
WBC # BLD: 7.55 K/UL — SIGNIFICANT CHANGE UP (ref 3.8–10.5)
WBC # BLD: 7.55 K/UL — SIGNIFICANT CHANGE UP (ref 3.8–10.5)
WBC # FLD AUTO: 7.55 K/UL — SIGNIFICANT CHANGE UP (ref 3.8–10.5)
WBC # FLD AUTO: 7.55 K/UL — SIGNIFICANT CHANGE UP (ref 3.8–10.5)

## 2023-11-04 PROCEDURE — 93005 ELECTROCARDIOGRAM TRACING: CPT

## 2023-11-04 PROCEDURE — 99285 EMERGENCY DEPT VISIT HI MDM: CPT | Mod: 25

## 2023-11-04 PROCEDURE — 99285 EMERGENCY DEPT VISIT HI MDM: CPT

## 2023-11-04 PROCEDURE — 73110 X-RAY EXAM OF WRIST: CPT | Mod: 26,RT

## 2023-11-04 PROCEDURE — 73110 X-RAY EXAM OF WRIST: CPT

## 2023-11-04 PROCEDURE — 36415 COLL VENOUS BLD VENIPUNCTURE: CPT

## 2023-11-04 PROCEDURE — 80048 BASIC METABOLIC PNL TOTAL CA: CPT

## 2023-11-04 PROCEDURE — 87635 SARS-COV-2 COVID-19 AMP PRB: CPT

## 2023-11-04 PROCEDURE — 85025 COMPLETE CBC W/AUTO DIFF WBC: CPT

## 2023-11-04 PROCEDURE — 80307 DRUG TEST PRSMV CHEM ANLYZR: CPT

## 2023-11-04 RX ORDER — RISPERIDONE 4 MG/1
4 TABLET ORAL ONCE
Refills: 0 | Status: COMPLETED | OUTPATIENT
Start: 2023-11-04 | End: 2023-11-04

## 2023-11-04 RX ORDER — ATORVASTATIN CALCIUM 80 MG/1
10 TABLET, FILM COATED ORAL AT BEDTIME
Refills: 0 | Status: DISCONTINUED | OUTPATIENT
Start: 2023-11-04 | End: 2023-11-08

## 2023-11-04 RX ORDER — MIRTAZAPINE 45 MG/1
15 TABLET, ORALLY DISINTEGRATING ORAL ONCE
Refills: 0 | Status: COMPLETED | OUTPATIENT
Start: 2023-11-04 | End: 2023-11-04

## 2023-11-04 RX ORDER — DIVALPROEX SODIUM 500 MG/1
500 TABLET, DELAYED RELEASE ORAL ONCE
Refills: 0 | Status: COMPLETED | OUTPATIENT
Start: 2023-11-04 | End: 2023-11-04

## 2023-11-04 RX ORDER — IBUPROFEN 200 MG
600 TABLET ORAL ONCE
Refills: 0 | Status: COMPLETED | OUTPATIENT
Start: 2023-11-04 | End: 2023-11-04

## 2023-11-04 RX ORDER — MIRTAZAPINE 45 MG/1
7.5 TABLET, ORALLY DISINTEGRATING ORAL ONCE
Refills: 0 | Status: COMPLETED | OUTPATIENT
Start: 2023-11-04 | End: 2023-11-04

## 2023-11-04 RX ADMIN — DIVALPROEX SODIUM 500 MILLIGRAM(S): 500 TABLET, DELAYED RELEASE ORAL at 22:11

## 2023-11-04 RX ADMIN — Medication 600 MILLIGRAM(S): at 22:12

## 2023-11-04 RX ADMIN — RISPERIDONE 4 MILLIGRAM(S): 4 TABLET ORAL at 22:12

## 2023-11-04 RX ADMIN — Medication 15 MILLIGRAM(S): at 23:11

## 2023-11-04 RX ADMIN — MIRTAZAPINE 15 MILLIGRAM(S): 45 TABLET, ORALLY DISINTEGRATING ORAL at 22:12

## 2023-11-04 RX ADMIN — ATORVASTATIN CALCIUM 10 MILLIGRAM(S): 80 TABLET, FILM COATED ORAL at 22:12

## 2023-11-04 RX ADMIN — MIRTAZAPINE 7.5 MILLIGRAM(S): 45 TABLET, ORALLY DISINTEGRATING ORAL at 22:12

## 2023-11-04 RX ADMIN — Medication 600 MILLIGRAM(S): at 22:13

## 2023-11-04 NOTE — ED ADULT NURSE NOTE - NSFALLUNIVINTERV_ED_ALL_ED
Bed/Stretcher in lowest position, wheels locked, appropriate side rails in place/Call bell, personal items and telephone in reach/Instruct patient to call for assistance before getting out of bed/chair/stretcher/Non-slip footwear applied when patient is off stretcher/[place] to call system/Physically safe environment - no spills, clutter or unnecessary equipment/Purposeful proactive rounding/Room/bathroom lighting operational, light cord in reach

## 2023-11-04 NOTE — ED PROVIDER NOTE - PATIENT PORTAL LINK FT
You can access the FollowMyHealth Patient Portal offered by [facility] by registering at the following website: [email or web address]. By joining [employer]’s FollowMyHealth portal, you will also be able to view your health information using other applications (apps) compatible with our system.

## 2023-11-04 NOTE — ED PROVIDER NOTE - CLINICAL SUMMARY MEDICAL DECISION MAKING FREE TEXT BOX
Patient presenting for wrist injury as long with psychiatric complaints.  Will obtain x-ray of the wrist for screen for underlying fracture.  We will also obtain psychiatric screening labs and placed on one-to-one pending psychiatry evaluation.

## 2023-11-04 NOTE — ED PROVIDER NOTE - PHYSICAL EXAMINATION
Exam:  General: Patient well appearing, vital signs within normal limits  HEENT: airway patent with moist mucous membranes  Cardiac: RRR S1/S2 with strong peripheral pulses  Respiratory: lungs clear without respiratory distress  MSK: swelling over ulnar aspect of wrist without deformity able to fully range

## 2023-11-04 NOTE — ED PROVIDER NOTE - PROGRESS NOTE DETAILS
Miladks-DO: pt received at sign-out, seen and evaluated at bedside.  Pt sitting comfortably in NAD. Patient cleared by telepsych. Patient declining UA, bladder scan without evidence of retention. Velcro splint applied to wrist. Charge RN discussed with , will pick pt up from ED.

## 2023-11-04 NOTE — ED ADULT NURSE NOTE - OBJECTIVE STATEMENT
Patient c/o worsening R wrist pain after self-injury today. Patient states "I wanted to hurt myself". Patient also reports pain at recent surgical site reports having right testicle removed on Monday.

## 2023-11-04 NOTE — ED PROVIDER NOTE - OBJECTIVE STATEMENT
33-year-old male with past medical history as above, frequent ED presentations reporting psychiatric complaints today reporting pain in his right wrist after he "popped it.  Also reporting worse than usual thoughts of self-injurious behaviors.

## 2023-11-05 VITALS
HEART RATE: 79 BPM | RESPIRATION RATE: 17 BRPM | OXYGEN SATURATION: 97 % | SYSTOLIC BLOOD PRESSURE: 114 MMHG | TEMPERATURE: 98 F | DIASTOLIC BLOOD PRESSURE: 84 MMHG

## 2023-11-05 PROCEDURE — 90792 PSYCH DIAG EVAL W/MED SRVCS: CPT | Mod: 95

## 2023-11-05 NOTE — ED BEHAVIORAL HEALTH ASSESSMENT NOTE - HPI (INCLUDE ILLNESS QUALITY, SEVERITY, DURATION, TIMING, CONTEXT, MODIFYING FACTORS, ASSOCIATED SIGNS AND SYMPTOMS)
Markus is a 33 year-old male, single, disabled, non-caregiver, living at a [facility], with PMHx of asthma, DM, urinary retention, GERD, BPH, hypothyroidism, HLD, HTN, and b/l myopia, with PPHx of moderate ID, ASD, impulse control disorder, factitious disorder, HAVEN, mood disorders, PTSD and ADHD, hx of multiple past psych admissions (last at [facility] 7/13-21/2023), numerous ED visits for both medical/psych complaints, well-known to telepsychiatry for frequent similar presentations, longstanding h/o SIB (head banging, cutting), no known SA, longstanding h/o endorsing SI, h/o aggression toward staff at , on Depakote 500 mg qAM & 1000mg qPM, Guanfacine 2 mg, Risperdal 3 mg BID, and Remeron 30 mg, prescribed by his PCP, who presents to the ED BIB EMS for wirst pain, and then upon medical examination, disclosing thoughts of harming self. Notably, patient has multiple similar presentations, including most recently on 9/27/23, 9/14/23, 8/20/23 and 7/24/23.     On exam, patient is notably child-like and concrete. He states that he broke his hand by "snapping it in half". States he did this on purpose because the voices told him too. Patient states that he probably needs surgery - but does not appear in any acute distress. Patient reports that he is moving on Monday and that he is "excited". Reports that his staff are also going to be transferring and states that they are good to him. During our conversation, patient spontaneously asks "do you need my staff's number so that they can pick me up". Patient reports that he feels safe going home. States that at the time of the interview, he was not hearing any voices. Denies thoughts of hurting himself, hurting others, killing self and killing others.     See [facility] note for collateral.

## 2023-11-05 NOTE — ED BEHAVIORAL HEALTH ASSESSMENT NOTE - SUMMARY
Azalea Ott MD Azalea Ott MD Azalea Ott MD English Markus is a 33 year-old male, single, disabled, non-caregiver, living at a [facility], with PMHx of asthma, DM, urinary retention, GERD, BPH, hypothyroidism, HLD, HTN, and b/l myopia, with PPHx of moderate ID, ASD, impulse control disorder, factitious disorder, HAVEN, mood disorders, PTSD and ADHD, hx of multiple past psych admissions (last at [facility] 7/13-21/2023), numerous ED visits for both medical/psych complaints, well-known to telepsychiatry for frequent similar presentations, longstanding h/o SIB (head banging, cutting), no known SA, longstanding h/o endorsing SI, h/o aggression toward staff at , on Depakote 500 mg qAM & 1000mg qPM, Guanfacine 2 mg, Risperdal 3 mg BID, and Remeron 30 mg, prescribed by his PCP, who presents to the ED BIB EMS for wirst pain, and then upon medical examination, disclosing thoughts of harming self. Notably, patient has multiple similar presentations, including most recently on 9/27/23, 9/14/23, 8/20/23 and 7/24/23.     Patient's presentation today is consistent with multiple other similar ED presentations. Patient presents child-like and concrete. Reported hearing voice to self harm and reportedly hurt his wrist. While in ED, pt has been calm, cooperating and not evidencing or endorsing any acutely dangerous psychiatric symptoms or any symptoms consistent with an acute mood or psychotic disorder that would be amenable to inpatient treatment. Patient is also not exhibiting any acutely dangerous behaviors. His current presentation is consistent with his documented baseline as evidenced by the fact that he provides an inconsistent history, initially endorses vague complaints of SI(which he subsequently retracts), and perseverates on being admitted to an inpatient unit in the setting of feeling frustrated with his living situation. Collateral obtained from patient's group home is reassuring. Patient is agreeable to returning to residence where he feels safe and was able to review his safety plan form his previous presentation. Presentation is most consistent with diagnoses of moderate intellectual disability, ASD, and malingering.

## 2023-11-05 NOTE — ED BEHAVIORAL HEALTH ASSESSMENT NOTE - SAFETY PLAN ADDT'L DETAILS
Safety plan discussed with.../Education provided regarding environmental safety / lethal means restriction/Provision of National Suicide Prevention Lifeline 2-148-331-VYSV (1407)

## 2023-11-05 NOTE — ED BEHAVIORAL HEALTH ASSESSMENT NOTE - OTHER
concrete not internally preoccupied with peers - [facility] supervisor limited (chronic) Evan  Psychiatry, [facility], , [facility] , [facility], [facility], [facility], [facility], [facility], [facility] Inpatient Psychiatry, [facility] Outpatient Medical, [employer], jaylin, google search, [facility] Inpatient Psychiatry, Raquel, [facility] ED staff lives in supervised setting; connected to care

## 2023-11-05 NOTE — ED BEHAVIORAL HEALTH ASSESSMENT NOTE - DETAILS
Denies suicidal ideation. Per chart has prior trauma (details unknown) agreed Per chart has had a rash in response to Zyprexa discussed, responses similar to last documented one in September 2023

## 2023-11-05 NOTE — ED BEHAVIORAL HEALTH ASSESSMENT NOTE - DESCRIPTION
Patient BIBEMS, with aid bedside. Calm and cooperative.     Vital Signs Last 24 Hrs  T(C): 36.6 (11-04-23 @ 19:07), Max: 36.6 (11-04-23 @ 19:07)  T(F): 97.8 (11-04-23 @ 19:07), Max: 97.8 (11-04-23 @ 19:07)  HR: 86 (11-04-23 @ 19:07) (86 - 86)  BP: 110/74 (11-04-23 @ 19:07) (110/74 - 110/74)  BP(mean): --  RR: 16 (11-04-23 @ 19:07) (16 - 16)  SpO2: 96% (11-04-23 @ 19:07) (96% - 96%) DM, HLD, BPH, hypothyroidism, GERD as per HPI

## 2023-11-05 NOTE — ED BEHAVIORAL HEALTH NOTE - BEHAVIORAL HEALTH NOTE
==================             PRE-HOSPITAL COURSE             ===================            SOURCE:  Secondhand EMR documentation.             DETAILS:  Patient Boo to ED: chief complaint of wrist pain.           ===========      ED COURSE:            ===========             SOURCE:  RN and secondhand EMR documentation.              ARRIVAL:  Patient noted to be cooperative with ED protocol. Patient gowned, wanded, and placed in private room on 1:1 for consult. Patient presents with good hygiene/grooming. RN notes cut jamin on stomach, patient didn't endorse how it happened.             BELONGINGS:  None notable.             BEHAVIOR: Blood/urine provided for routine labs without noted incident. No SI/HI/AH/VH elicited per RN. Patient is alert, orientedx4, and makes eye contact; speech of normal volume and rate accompanied by logical thought process. Patient has been in hospital bed while in ED; presently observed to be resting.     TREATMENT: Patient remains in behavioral control for duration of ED stay, no PRN's administered, per charting received PM medications.      Visitors: Patient presently accompanied by GH aide while in ED; remains appropriate with patient while in ED.

## 2023-11-05 NOTE — ED BEHAVIORAL HEALTH NOTE - BEHAVIORAL HEALTH NOTE
========================     COLLATERAL     ========================     NAME: Shahriar          NUMBER: [phone]          RELATIONSHIP:  [facility] staff          RELIABILITY: good          COMMENTS:           Patient gives permission to obtain collateral from _____:     ( x ) Yes     (  )  No     Rationale for overriding objection               (  ) Lack of capacity. Details: ________               ( ) Assessing risk of danger to self/others. Details: ________            Rationale for selecting specific collateral source               (  ) Potential to impact risk of danger to self/others and no alternative equivalent. Details: _____               ========================     HPI     ========================          BASELINE FUNCTIONING: Per staff member is frequently disruptive at group home, often tries to leave, frequent SI/AH at baseline.           DATE HPI STARTED: Today          DECOMPENSATION: Per staff patient was in “one of her moods” that she frequently gets in earlier, patient was agitated during the day but no physical violence. No reported SI, staff states patient did mention some AH but can’t provide specifics. Patient came in initially for wrist pain. No recent acute changes noted, staff state patient can return to home if cleared.           SUICIDALITY: None reported to staff          VIOLENCE: None          SUBSTANCE: None          HISTORY: Frequent ER stays/admissions, see chart.           COVID Exposure Screen- collateral (i.e. third-party, chart review, belongings, etc; include EMS and ED staff)     *Has the patient had a COVID-19 test in the last 90 days?? (? ) Yes?? (? ) No?? (x? ) Unknown- Reason: _____     IF YES PROCEED TO QUESTION #2. IF NO OR UNKNOWN, PLEASE SKIP TO QUESTION #3.     Date of test(s) and result(s): ________     *Has the patient tested positive for COVID-19 antibodies? (? ) Yes?? (? ) No?? (? x) Unknown- Reason: _____     IF YES PROCEED TO QUESTION #4. IF NO or UNKNOWN, PLEASE SKIP TO QUESTION #5.     Date of positive antibody test: ________     *Has the patient received 2 doses of the COVID-19 vaccine? (? ) Yes?? (? ) No?? (x? ) Unknown- Reason: _____     IF YES PROCEED TO QUESTION #6. IF NO or UNKNOWN, PLEASE SKIP TO QUESTION #7.     ?Date of second dose: ________     *In the past 10 days, has the patient been around anyone with a positive COVID-19 test?* (? ) Yes?? (? ) No?? (x? ) Unknown- Reason: __     IF YES PROCEED TO QUESTION #8. IF NO or UNKNOWN, PLEASE SKIP TO QUESTION #13.     Was the patient within 6 feet of them for at least 15 minutes? (? ) Yes?? (? ) No?? (?x ) Unknown- Reason: _____     Did the patient provide care for them? (? ) Yes?? (? ) No?? (? x) Unknown- Reason: ______     Did the patient have direct physical contact with them (touched, hugged, or kissed them)? (? ) Yes?? (? ) No??? (?x ) Unknown- Reason: __     Did the patient share eating or drinking utensils with them? (? ) Yes?? (? ) No??? (? ) Unknown- Reason: ____     Did they sneeze, cough, or somehow get respiratory droplets on the patient? (? ) Yes?? (? ) No??? (?x ) Unknown- Reason: ______     *Has the patient been out of [place] within the past 10 days?* (? ) Yes?? (? ) No?? (?x ) Unknown- Reason: _____     IF YES PLEASE ANSWER THE FOLLOWING QUESTIONS:     Which state/country did they go to? ______     Were they there over 24 hours? (? ) Yes?? (? ) No??? (? ) Unknown- Reason: ______     Date of return to [facility]: ______

## 2023-11-06 ENCOUNTER — OUTPATIENT (OUTPATIENT)
Dept: OUTPATIENT SERVICES | Facility: HOSPITAL | Age: 33
LOS: 1 days | Discharge: HOME | End: 2023-11-06

## 2023-11-06 DIAGNOSIS — Z90.79 ACQUIRED ABSENCE OF OTHER GENITAL ORGAN(S): Chronic | ICD-10-CM

## 2023-11-06 DIAGNOSIS — C62.90 MALIGNANT NEOPLASM OF UNSPECIFIED TESTIS, UNSPECIFIED WHETHER DESCENDED OR UNDESCENDED: ICD-10-CM

## 2023-11-09 ENCOUNTER — TRANSCRIPTION ENCOUNTER (OUTPATIENT)
Age: 33
End: 2023-11-09

## 2023-11-12 NOTE — ED PROVIDER NOTE - CONSTITUTIONAL NEGATIVE STATEMENT, MLM
Pt to ed from home with swelling noted to left hand. Pt reports woke up this morning with swelling. Denies any known injury. Pt hand swollen and hot to touch.    no fever and no chills.

## 2023-11-15 ENCOUNTER — RESULT REVIEW (OUTPATIENT)
Age: 33
End: 2023-11-15

## 2023-11-15 ENCOUNTER — APPOINTMENT (OUTPATIENT)
Dept: HEMATOLOGY ONCOLOGY | Facility: CLINIC | Age: 33
End: 2023-11-15
Payer: MEDICAID

## 2023-11-15 VITALS
TEMPERATURE: 97.4 F | DIASTOLIC BLOOD PRESSURE: 82 MMHG | SYSTOLIC BLOOD PRESSURE: 117 MMHG | BODY MASS INDEX: 41.37 KG/M2 | WEIGHT: 289 LBS | OXYGEN SATURATION: 9 % | RESPIRATION RATE: 16 BRPM | HEART RATE: 94 BPM | HEIGHT: 70 IN

## 2023-11-15 LAB
AFP-TM SERPL-MCNC: 4.1 NG/ML
ALBUMIN SERPL ELPH-MCNC: 4.7 G/DL
ALP BLD-CCNC: 105 U/L
ALT SERPL-CCNC: 74 U/L
ANION GAP SERPL CALC-SCNC: 14 MMOL/L
AST SERPL-CCNC: 47 U/L
BASOPHILS # BLD AUTO: 0.03 K/UL — SIGNIFICANT CHANGE UP (ref 0–0.2)
BASOPHILS # BLD AUTO: 0.03 K/UL — SIGNIFICANT CHANGE UP (ref 0–0.2)
BASOPHILS NFR BLD AUTO: 0.4 % — SIGNIFICANT CHANGE UP (ref 0–2)
BASOPHILS NFR BLD AUTO: 0.4 % — SIGNIFICANT CHANGE UP (ref 0–2)
BILIRUB SERPL-MCNC: 0.3 MG/DL
BUN SERPL-MCNC: 14 MG/DL
CALCIUM SERPL-MCNC: 10 MG/DL
CHLORIDE SERPL-SCNC: 99 MMOL/L
CO2 SERPL-SCNC: 25 MMOL/L
CREAT SERPL-MCNC: 0.87 MG/DL
EGFR: 116 ML/MIN/1.73M2
EOSINOPHIL # BLD AUTO: 0.33 K/UL — SIGNIFICANT CHANGE UP (ref 0–0.5)
EOSINOPHIL # BLD AUTO: 0.33 K/UL — SIGNIFICANT CHANGE UP (ref 0–0.5)
EOSINOPHIL NFR BLD AUTO: 4.7 % — SIGNIFICANT CHANGE UP (ref 0–6)
EOSINOPHIL NFR BLD AUTO: 4.7 % — SIGNIFICANT CHANGE UP (ref 0–6)
GLUCOSE SERPL-MCNC: 138 MG/DL
HCG SERPL-MCNC: <1 MIU/ML
HCT VFR BLD CALC: 40.7 % — SIGNIFICANT CHANGE UP (ref 39–50)
HCT VFR BLD CALC: 40.7 % — SIGNIFICANT CHANGE UP (ref 39–50)
HGB BLD-MCNC: 13.2 G/DL — SIGNIFICANT CHANGE UP (ref 13–17)
HGB BLD-MCNC: 13.2 G/DL — SIGNIFICANT CHANGE UP (ref 13–17)
IMM GRANULOCYTES NFR BLD AUTO: 0.6 % — SIGNIFICANT CHANGE UP (ref 0–0.9)
IMM GRANULOCYTES NFR BLD AUTO: 0.6 % — SIGNIFICANT CHANGE UP (ref 0–0.9)
LDH SERPL-CCNC: 306 U/L
LYMPHOCYTES # BLD AUTO: 1.92 K/UL — SIGNIFICANT CHANGE UP (ref 1–3.3)
LYMPHOCYTES # BLD AUTO: 1.92 K/UL — SIGNIFICANT CHANGE UP (ref 1–3.3)
LYMPHOCYTES # BLD AUTO: 27.1 % — SIGNIFICANT CHANGE UP (ref 13–44)
LYMPHOCYTES # BLD AUTO: 27.1 % — SIGNIFICANT CHANGE UP (ref 13–44)
MCHC RBC-ENTMCNC: 25.3 PG — LOW (ref 27–34)
MCHC RBC-ENTMCNC: 25.3 PG — LOW (ref 27–34)
MCHC RBC-ENTMCNC: 32.4 G/DL — SIGNIFICANT CHANGE UP (ref 32–36)
MCHC RBC-ENTMCNC: 32.4 G/DL — SIGNIFICANT CHANGE UP (ref 32–36)
MCV RBC AUTO: 78.1 FL — LOW (ref 80–100)
MCV RBC AUTO: 78.1 FL — LOW (ref 80–100)
MONOCYTES # BLD AUTO: 0.42 K/UL — SIGNIFICANT CHANGE UP (ref 0–0.9)
MONOCYTES # BLD AUTO: 0.42 K/UL — SIGNIFICANT CHANGE UP (ref 0–0.9)
MONOCYTES NFR BLD AUTO: 5.9 % — SIGNIFICANT CHANGE UP (ref 2–14)
MONOCYTES NFR BLD AUTO: 5.9 % — SIGNIFICANT CHANGE UP (ref 2–14)
NEUTROPHILS # BLD AUTO: 4.34 K/UL — SIGNIFICANT CHANGE UP (ref 1.8–7.4)
NEUTROPHILS # BLD AUTO: 4.34 K/UL — SIGNIFICANT CHANGE UP (ref 1.8–7.4)
NEUTROPHILS NFR BLD AUTO: 61.3 % — SIGNIFICANT CHANGE UP (ref 43–77)
NEUTROPHILS NFR BLD AUTO: 61.3 % — SIGNIFICANT CHANGE UP (ref 43–77)
NRBC # BLD: 0 /100 WBCS — SIGNIFICANT CHANGE UP (ref 0–0)
NRBC # BLD: 0 /100 WBCS — SIGNIFICANT CHANGE UP (ref 0–0)
PLATELET # BLD AUTO: 225 K/UL — SIGNIFICANT CHANGE UP (ref 150–400)
PLATELET # BLD AUTO: 225 K/UL — SIGNIFICANT CHANGE UP (ref 150–400)
POTASSIUM SERPL-SCNC: 4.6 MMOL/L
PROT SERPL-MCNC: 7 G/DL
RBC # BLD: 5.21 M/UL — SIGNIFICANT CHANGE UP (ref 4.2–5.8)
RBC # BLD: 5.21 M/UL — SIGNIFICANT CHANGE UP (ref 4.2–5.8)
RBC # FLD: 14.6 % — HIGH (ref 10.3–14.5)
RBC # FLD: 14.6 % — HIGH (ref 10.3–14.5)
SODIUM SERPL-SCNC: 139 MMOL/L
WBC # BLD: 7.08 K/UL — SIGNIFICANT CHANGE UP (ref 3.8–10.5)
WBC # BLD: 7.08 K/UL — SIGNIFICANT CHANGE UP (ref 3.8–10.5)
WBC # FLD AUTO: 7.08 K/UL — SIGNIFICANT CHANGE UP (ref 3.8–10.5)
WBC # FLD AUTO: 7.08 K/UL — SIGNIFICANT CHANGE UP (ref 3.8–10.5)

## 2023-11-15 PROCEDURE — 99214 OFFICE O/P EST MOD 30 MIN: CPT

## 2023-11-15 NOTE — ED PROVIDER NOTE - NS ED MD DISPO DISCHARGE
Search Terms: kell daigle, 1962Search Date: 11/15/2023 19:29:22 PM  The Drug Utilization Report below displays all of the controlled substance prescriptions, if any, that your patient has filled in the last twelve months. The information displayed on this report is compiled from pharmacy submissions to the Department, and accurately reflects the information as submitted by the pharmacies.    This  Reference #: [id number]    You have not added a PITA number. Keeping your PITA number(s) up to date on the My PITA # tab will enable the separation of your prescriptions from others in the search results.    Practitioner Count: 1  Pharmacy Count: 1  Current Opioid Prescriptions: 1  Current Benzodiazepine Prescriptions: 0  Current Stimulant Prescriptions: 0      Patient Demographic Information (PDI)       PDI	First Name	Last Name	Birth Date	Gender	Street Address	[facility]	Zip Code  A	Kell Daigle	1962	Female	[address]	[address]    Prescription Information      PDI Filter:    PDI	Current Rx	Drug Type	Rx Written	Rx Dispensed	Drug	Quantity	Days Supply	Prescriber Name	Prescriber PITA #	Payment Method	Dispenser  A	Y	O	10/11/2023	10/23/2023	buprenorphine-naloxone 8-2 mg sl tablet	84	28	Radha Ornelas T	[id number]	Medicaid	[id number]  A	N	O	09/15/2023	09/28/2023	buprenorphine-naloxone 8-2 mg sl tablet	84	28	Radha Ornelas T	[id number]	Medicaid	[id number]  A	N	O	08/21/2023	08/31/2023	buprenorphine-naloxone 8-2 mg sl tablet	84	28	Radha Ornelas T	[id number]	Medicaid	[id number]  A	N	O	07/24/2023	08/03/2023	buprenorphine-naloxone 8-2 mg sl tablet	84	28	Radha Ornelas T	[id number]	Medicaid	[id number]  A	N	O	06/21/2023	07/05/2023	buprenorphine-naloxone 8-2 mg sl tablet	84	28	Radha Ornelas T	[id number]	Medicaid	[id number]  A	N	O	05/24/2023	06/04/2023	buprenorphine-naloxone 8-2 mg sl tablet	84	28	Radha Ornelas T	[id number]	Medicaid	[id number]  A	N	O	04/26/2023	05/07/2023	buprenorphine-naloxone 8-2 mg sl tablet	84	28	Radha Ornelas T	[id number]	Medicaid	[id number]  A	N	O	03/30/2023	04/09/2023	buprenorphine-naloxone 8-2 mg sl tablet	84	28	Radha Ornelas T	[id number]	Medicaid	[id number]  A	N	O	03/06/2023	03/13/2023	buprenorphine-naloxone 8-2 mg sl tablet	84	28	Radha Ornelas T	[id number]	Medicaid	[id number]  A	N	O	02/08/2023	02/14/2023	buprenorphine-naloxone 8-2 mg sl tablet	84	28	Radha Ornelas T	[id number]	Medicaid	[id number]  A	N	O	01/11/2023	01/17/2023	buprenorphine-naloxone 8-2 mg sl tablet	84	28	Radha Ornelas T	[id number]	Medicaid	[id number]  A	N	O	12/14/2022	12/20/2022	buprenorphine-naloxone 8-2 mg sl tablet	84	28	Radha Ornelas T	[id number]	Medicaid	[id number]  A	N	O	11/16/2022	11/22/2022	buprenorphine-naloxone 8-2 mg sl tablet	84	28	Radha Ornelas T	[id number]	Medicaid	[id number]    * - Details of Drug Type : O = Opioid, B = Benzodiazepine, S = Stimulant Home

## 2023-11-15 NOTE — ED BEHAVIORAL HEALTH ASSESSMENT NOTE - NS ED BHA HOMICIDALITY PRESENT CURRENT INTENT
Assessment and Plan:  INNA (generalized anxiety disorder)  Anxiety - started lexapro 10/18  and states that it is working well to keep mood stable.  Would like to continue on the current dose.  Denies phobias or panic attacks. Feels medication does relieve symptoms.  Patient takes pleasure in usual activities.  Does note some fatigue, does have an irregular sleep cycle that she is working on, doesn't want to stop the med. Denies change in appetite, weight change, change in concentration, psychomotor retardation or agitation, thoughts of suicide.    Moderate major depression (CMD)  Depression - started lexapro 10/18 and states that it is working well to keep mood stable.  Would like to continue on the current dose.  Denies suicidal ideations and has no thoughts of harming others. Feels medication does relieve symptoms.  Patient is experiencing less depressed mood or loss of interest and takes pleasure in usual activities. Does note some fatigue, does have an irregular sleep cycle that she is working on, doesn't want to stop the med.  Denies change in appetite, weight change, change in concentration, psychomotor retardation or agitation, thoughts of suicide.       Insomnia  Insomnia - Notes trouble falling and staying asleep. Notes change in concentration, fatigue, agitation. Does have therapist.  Has tried trazadone, hydroxyzine, and sertraline without relief. States CBD helps and ZZZ, instructed she can take with the new lexapro Rx. Denies change in appetite, weight change, thoughts of suicide. Patient is avoiding screen time and caffeine before bedtime. Has referral for psych already.     Other hemorrhoids  Resolved with using wet wipes as TP    Onychomycosis of toenail  Notes slight improvement. Feels like nail is \"getting stuck\" Discussed getting a pedicure        Please refer to patient education for plan details    Return in about 3 months (around 2/17/2024) for anxiety, depression - virtual.        SUBJECTIVE:  History of Present Illness:  Namita Thompson is a 32 year old female connecting for a virtual tele visit for   Chief Complaint   Patient presents with   • Video Visit     anxiety   .    Depression - started lexapro 10/18 and states that it is working well to keep mood stable.  Would like to continue on the current dose.  Denies suicidal ideations and has no thoughts of harming others. Feels medication does relieve symptoms.  Patient is experiencing less depressed mood or loss of interest and takes pleasure in usual activities. Does note some fatigue, does have an irregular sleep cycle that she is working on, doesn't want to stop the med.  Denies change in appetite, weight change, change in concentration, psychomotor retardation or agitation, thoughts of suicide.        Anxiety - started lexapro 10/18  and states that it is working well to keep mood stable.  Would like to continue on the current dose.  Denies phobias or panic attacks. Feels medication does relieve symptoms.  Patient takes pleasure in usual activities.  Does note some fatigue, does have an irregular sleep cycle that she is working on, doesn't want to stop the med. Denies change in appetite, weight change, change in concentration, psychomotor retardation or agitation, thoughts of suicide.      Insomnia - Notes trouble falling and staying asleep. Notes change in concentration, fatigue, agitation. Does have therapist.  Has tried trazadone, hydroxyzine, and sertraline without relief. States CBD helps and ZZZ, instructed she can take with the new lexapro Rx. Denies change in appetite, weight change, thoughts of suicide. Patient is avoiding screen time and caffeine before bedtime. Has referral for psych already.             Verbal consent for initiation of telemedicine visit was obtained. The patient agreed to the following conditions: they may be charged a co-pay; they are in a private area during the videoconferencing encounter; and  they agree that [facility] is not responsible for data charges.    Patient location: Home  Provider location: Office  Additional persons participating in encounter:  medical assistant    Review of Systems:  Negative unless listed above or in HPI    Current Outpatient Medications   Medication Sig Dispense Refill   • docusate sodium (Colace) 100 MG capsule Take 1 capsule by mouth 2 times daily as needed for Constipation. 30 capsule 1   • hydroCORTisone (ANUSOL-HC) 25 MG suppository Place 1 suppository rectally in the morning and 1 suppository in the evening. 30 suppository 0   • escitalopram (LEXAPRO) 5 MG tablet Take 1 tablet by mouth daily. 90 tablet 0   • medroxyPROGESTERone Acetate (DEPO-PROVERA IM)        No current facility-administered medications for this visit.       FAMILY HISTORY:  Family History   Problem Relation Age of Onset   • Patient is unaware of any medical problems Mother    • Patient is unaware of any medical problems Father    • Patient is unaware of any medical problems Brother    • Patient is unaware of any medical problems Brother    • Leukemia Maternal Grandmother    • Patient is unaware of any medical problems Maternal Grandfather    • Patient is unaware of any medical problems Paternal Grandmother    • Patient is unaware of any medical problems Paternal Grandfather    • Cervical cancer Neg Hx    • Cancer, Colon Neg Hx      SOCIAL HISTORY:  Social History     Tobacco Use   • Smoking status: Never   • Smokeless tobacco: Never   Vaping Use   • Vaping Use: Former   Substance Use Topics   • Alcohol use: Yes     Comment: socially   • Drug use: Not Currently     Comment: cbd         OBJECTIVE:   Physical Exam:  No vitals taken this encounter  Constitutional:       Appearance: Normal appearance. No acute distress.  Pulmonary:      Effort: Pulmonary effort is normal.    Neurological:      Mental Status: She is alert and oriented to person, place, and time. Mental status is at baseline.    Psychiatric:         Mood and Affect: Mood normal.         Behavior: Behavior normal.         Thought Content: Thought content normal.         Judgment: Judgment normal.           Refer to orders.  Medical compliance with plan discussed and risks of non-compliance reviewed.  Patient education completed on disease process, etiology & prognosis.  Proper usage and side effects of medications reviewed & discussed.  Return to clinic as clinically indicated as discussed with the patient who verbalized understanding of the plan and is in agreement with the plan.   None known

## 2023-11-16 ENCOUNTER — TRANSCRIPTION ENCOUNTER (OUTPATIENT)
Age: 33
End: 2023-11-16

## 2023-11-16 NOTE — H&P ADULT - NSHPRISKHIVSCREEN_GEN_ALL_CORE
"Inpatient Palliative Care     Location: [place] [address]     HPI:   Patient is seen lying in bed.  He states he is getting PICC line today.  He has PPN running at this time.  Family is at bedside.  He states Dr. Pantoja has just seen him and states that he can have Keytruda outpatient only.  He continues with NG tube in place.  Pain remains poorly controlled.     Summary:  Patient's wife, sister, daughter, and mother at bedside.  He gives update on Dr. Pantoja's visit.  Explored ongoing prognosis given nature of high-grade bowel obstruction.  Patient reports he has had a bowel movement today and feels encouraged by this.  He does state that he is feeling weaker.  Proposed starting steroids to assist with pain management, patient is in agreement.  Introduced subject of CODE STATUS.  Per previous conversations Pat and Angelo have never discussed a situation like this 1 previously.  Explained likely outcome if patient were to arrest and be resuscitated would be associated with poor prognosis given clinical condition metastatic cancer.  He verbalizes understanding of this and wishes to remain full CODE STATUS at this time.    Spent time discussing future care options including necessity for ongoing TPN if needed.  Additionally discussed NG tube/prognosis related to bowel obstruction.  Patient remains hopeful but does admit he knows it is a \"long shot\".     Active listening, reflection, reminiscing, validation & normalization, and empathic support utilized throughout this encounter.  All questions answered and contact information provided, encouraged to call with any questions or concerns.      Plan:     1) continue to address goals of care conversation as clinical picture continues to unfold  2) readdress CODE STATUS with same  3) initiation of dexamethasone 4 mg in a.m. and afternoon to decrease insomnia symptoms x7 days and reassess     Thank you for allowing me the opportunity to participate in the care of  Angelo.    I spent a " total of 17 minutes discussing advance care planning/CODE STATUS independent of time as indicated below.    I spent a total of 37 minutes reviewing medical records, direct face-to-face time with the patient and/or family, coordination of care, and documentation. This is separate from the time spent on advance care planning, which is documented above.     Glo Villagomez, MSN, APRN, ACNPC-AG.  Palliative Care Nurse Practitioner  [phone]     Unable to offer due to clinical condition

## 2023-11-20 ENCOUNTER — EMERGENCY (EMERGENCY)
Facility: HOSPITAL | Age: 33
LOS: 1 days | Discharge: HOME | End: 2023-11-20
Attending: EMERGENCY MEDICINE
Payer: MEDICAID

## 2023-11-20 ENCOUNTER — NON-APPOINTMENT (OUTPATIENT)
Age: 33
End: 2023-11-20

## 2023-11-20 VITALS
OXYGEN SATURATION: 96 % | TEMPERATURE: 98 F | RESPIRATION RATE: 16 BRPM | HEIGHT: 71 IN | DIASTOLIC BLOOD PRESSURE: 84 MMHG | SYSTOLIC BLOOD PRESSURE: 112 MMHG | HEART RATE: 87 BPM | WEIGHT: 292.99 LBS

## 2023-11-20 DIAGNOSIS — Z90.79 ACQUIRED ABSENCE OF OTHER GENITAL ORGAN(S): Chronic | ICD-10-CM

## 2023-11-20 LAB
ALBUMIN SERPL ELPH-MCNC: 3.3 G/DL — LOW (ref 3.5–5)
ALBUMIN SERPL ELPH-MCNC: 3.3 G/DL — LOW (ref 3.5–5)
ALP SERPL-CCNC: 103 U/L — SIGNIFICANT CHANGE UP (ref 40–120)
ALP SERPL-CCNC: 103 U/L — SIGNIFICANT CHANGE UP (ref 40–120)
ALT FLD-CCNC: 81 U/L DA — HIGH (ref 10–60)
ALT FLD-CCNC: 81 U/L DA — HIGH (ref 10–60)
ANION GAP SERPL CALC-SCNC: 5 MMOL/L — SIGNIFICANT CHANGE UP (ref 5–17)
ANION GAP SERPL CALC-SCNC: 5 MMOL/L — SIGNIFICANT CHANGE UP (ref 5–17)
AST SERPL-CCNC: 42 U/L — HIGH (ref 10–40)
AST SERPL-CCNC: 42 U/L — HIGH (ref 10–40)
BASOPHILS # BLD AUTO: 0.05 K/UL — SIGNIFICANT CHANGE UP (ref 0–0.2)
BASOPHILS # BLD AUTO: 0.05 K/UL — SIGNIFICANT CHANGE UP (ref 0–0.2)
BASOPHILS NFR BLD AUTO: 0.7 % — SIGNIFICANT CHANGE UP (ref 0–2)
BASOPHILS NFR BLD AUTO: 0.7 % — SIGNIFICANT CHANGE UP (ref 0–2)
BILIRUB SERPL-MCNC: 0.3 MG/DL — SIGNIFICANT CHANGE UP (ref 0.2–1.2)
BILIRUB SERPL-MCNC: 0.3 MG/DL — SIGNIFICANT CHANGE UP (ref 0.2–1.2)
BUN SERPL-MCNC: 13 MG/DL — SIGNIFICANT CHANGE UP (ref 7–18)
BUN SERPL-MCNC: 13 MG/DL — SIGNIFICANT CHANGE UP (ref 7–18)
CALCIUM SERPL-MCNC: 9 MG/DL — SIGNIFICANT CHANGE UP (ref 8.4–10.5)
CALCIUM SERPL-MCNC: 9 MG/DL — SIGNIFICANT CHANGE UP (ref 8.4–10.5)
CHLORIDE SERPL-SCNC: 107 MMOL/L — SIGNIFICANT CHANGE UP (ref 96–108)
CHLORIDE SERPL-SCNC: 107 MMOL/L — SIGNIFICANT CHANGE UP (ref 96–108)
CO2 SERPL-SCNC: 25 MMOL/L — SIGNIFICANT CHANGE UP (ref 22–31)
CO2 SERPL-SCNC: 25 MMOL/L — SIGNIFICANT CHANGE UP (ref 22–31)
CREAT SERPL-MCNC: 0.94 MG/DL — SIGNIFICANT CHANGE UP (ref 0.5–1.3)
CREAT SERPL-MCNC: 0.94 MG/DL — SIGNIFICANT CHANGE UP (ref 0.5–1.3)
EGFR: 109 ML/MIN/1.73M2 — SIGNIFICANT CHANGE UP
EGFR: 109 ML/MIN/1.73M2 — SIGNIFICANT CHANGE UP
EOSINOPHIL # BLD AUTO: 0.26 K/UL — SIGNIFICANT CHANGE UP (ref 0–0.5)
EOSINOPHIL # BLD AUTO: 0.26 K/UL — SIGNIFICANT CHANGE UP (ref 0–0.5)
EOSINOPHIL NFR BLD AUTO: 3.7 % — SIGNIFICANT CHANGE UP (ref 0–6)
EOSINOPHIL NFR BLD AUTO: 3.7 % — SIGNIFICANT CHANGE UP (ref 0–6)
ETHANOL SERPL-MCNC: <3 MG/DL — SIGNIFICANT CHANGE UP (ref 0–10)
ETHANOL SERPL-MCNC: <3 MG/DL — SIGNIFICANT CHANGE UP (ref 0–10)
GLUCOSE SERPL-MCNC: 128 MG/DL — HIGH (ref 70–99)
GLUCOSE SERPL-MCNC: 128 MG/DL — HIGH (ref 70–99)
HCT VFR BLD CALC: 40.6 % — SIGNIFICANT CHANGE UP (ref 39–50)
HCT VFR BLD CALC: 40.6 % — SIGNIFICANT CHANGE UP (ref 39–50)
HGB BLD-MCNC: 12.7 G/DL — LOW (ref 13–17)
HGB BLD-MCNC: 12.7 G/DL — LOW (ref 13–17)
IMM GRANULOCYTES NFR BLD AUTO: 0.6 % — SIGNIFICANT CHANGE UP (ref 0–0.9)
IMM GRANULOCYTES NFR BLD AUTO: 0.6 % — SIGNIFICANT CHANGE UP (ref 0–0.9)
LYMPHOCYTES # BLD AUTO: 3.07 K/UL — SIGNIFICANT CHANGE UP (ref 1–3.3)
LYMPHOCYTES # BLD AUTO: 3.07 K/UL — SIGNIFICANT CHANGE UP (ref 1–3.3)
LYMPHOCYTES # BLD AUTO: 43.5 % — SIGNIFICANT CHANGE UP (ref 13–44)
LYMPHOCYTES # BLD AUTO: 43.5 % — SIGNIFICANT CHANGE UP (ref 13–44)
MCHC RBC-ENTMCNC: 25.1 PG — LOW (ref 27–34)
MCHC RBC-ENTMCNC: 25.1 PG — LOW (ref 27–34)
MCHC RBC-ENTMCNC: 31.3 GM/DL — LOW (ref 32–36)
MCHC RBC-ENTMCNC: 31.3 GM/DL — LOW (ref 32–36)
MCV RBC AUTO: 80.4 FL — SIGNIFICANT CHANGE UP (ref 80–100)
MCV RBC AUTO: 80.4 FL — SIGNIFICANT CHANGE UP (ref 80–100)
MONOCYTES # BLD AUTO: 0.58 K/UL — SIGNIFICANT CHANGE UP (ref 0–0.9)
MONOCYTES # BLD AUTO: 0.58 K/UL — SIGNIFICANT CHANGE UP (ref 0–0.9)
MONOCYTES NFR BLD AUTO: 8.2 % — SIGNIFICANT CHANGE UP (ref 2–14)
MONOCYTES NFR BLD AUTO: 8.2 % — SIGNIFICANT CHANGE UP (ref 2–14)
NEUTROPHILS # BLD AUTO: 3.05 K/UL — SIGNIFICANT CHANGE UP (ref 1.8–7.4)
NEUTROPHILS # BLD AUTO: 3.05 K/UL — SIGNIFICANT CHANGE UP (ref 1.8–7.4)
NEUTROPHILS NFR BLD AUTO: 43.3 % — SIGNIFICANT CHANGE UP (ref 43–77)
NEUTROPHILS NFR BLD AUTO: 43.3 % — SIGNIFICANT CHANGE UP (ref 43–77)
NRBC # BLD: 0 /100 WBCS — SIGNIFICANT CHANGE UP (ref 0–0)
NRBC # BLD: 0 /100 WBCS — SIGNIFICANT CHANGE UP (ref 0–0)
PLATELET # BLD AUTO: 255 K/UL — SIGNIFICANT CHANGE UP (ref 150–400)
PLATELET # BLD AUTO: 255 K/UL — SIGNIFICANT CHANGE UP (ref 150–400)
POTASSIUM SERPL-MCNC: 4.6 MMOL/L — SIGNIFICANT CHANGE UP (ref 3.5–5.3)
POTASSIUM SERPL-MCNC: 4.6 MMOL/L — SIGNIFICANT CHANGE UP (ref 3.5–5.3)
POTASSIUM SERPL-SCNC: 4.6 MMOL/L — SIGNIFICANT CHANGE UP (ref 3.5–5.3)
POTASSIUM SERPL-SCNC: 4.6 MMOL/L — SIGNIFICANT CHANGE UP (ref 3.5–5.3)
PROT SERPL-MCNC: 6.8 G/DL — SIGNIFICANT CHANGE UP (ref 6–8.3)
PROT SERPL-MCNC: 6.8 G/DL — SIGNIFICANT CHANGE UP (ref 6–8.3)
RBC # BLD: 5.05 M/UL — SIGNIFICANT CHANGE UP (ref 4.2–5.8)
RBC # BLD: 5.05 M/UL — SIGNIFICANT CHANGE UP (ref 4.2–5.8)
RBC # FLD: 14.3 % — SIGNIFICANT CHANGE UP (ref 10.3–14.5)
RBC # FLD: 14.3 % — SIGNIFICANT CHANGE UP (ref 10.3–14.5)
SODIUM SERPL-SCNC: 137 MMOL/L — SIGNIFICANT CHANGE UP (ref 135–145)
SODIUM SERPL-SCNC: 137 MMOL/L — SIGNIFICANT CHANGE UP (ref 135–145)
WBC # BLD: 7.05 K/UL — SIGNIFICANT CHANGE UP (ref 3.8–10.5)
WBC # BLD: 7.05 K/UL — SIGNIFICANT CHANGE UP (ref 3.8–10.5)
WBC # FLD AUTO: 7.05 K/UL — SIGNIFICANT CHANGE UP (ref 3.8–10.5)
WBC # FLD AUTO: 7.05 K/UL — SIGNIFICANT CHANGE UP (ref 3.8–10.5)

## 2023-11-20 PROCEDURE — 99285 EMERGENCY DEPT VISIT HI MDM: CPT

## 2023-11-20 PROCEDURE — 74177 CT ABD & PELVIS W/CONTRAST: CPT | Mod: 26,MA

## 2023-11-20 RX ORDER — SODIUM CHLORIDE 9 MG/ML
1000 INJECTION INTRAMUSCULAR; INTRAVENOUS; SUBCUTANEOUS ONCE
Refills: 0 | Status: COMPLETED | OUTPATIENT
Start: 2023-11-20 | End: 2023-11-20

## 2023-11-20 RX ADMIN — SODIUM CHLORIDE 1000 MILLILITER(S): 9 INJECTION INTRAMUSCULAR; INTRAVENOUS; SUBCUTANEOUS at 22:54

## 2023-11-20 RX ADMIN — SODIUM CHLORIDE 1000 MILLILITER(S): 9 INJECTION INTRAMUSCULAR; INTRAVENOUS; SUBCUTANEOUS at 23:54

## 2023-11-20 NOTE — ED PROVIDER NOTE - CLINICAL SUMMARY MEDICAL DECISION MAKING FREE TEXT BOX
33 yr old male with hx group home ([phone]) with PMH of DM, BPH, GERD, B/L Testicular cancer (s/p left testicular removal, right testicular removal), Hypothyroidism, Autism, Bipolar disorder, and Asthma presents to ed c/o constipation and inability to urinate since testicular removal. no vomiting, no fever, no penile discharge.    constipation possibly causing issues with urinating vs urinary retention vs anne-marie- labs, ct, 33 yr old male with hx group home ([phone]) with PMH of DM, BPH, GERD, B/L Testicular cancer (s/p left testicular removal, right testicular removal), Hypothyroidism, Autism, Bipolar disorder, and Asthma presents to ed c/o constipation and inability to urinate since testicular removal. no vomiting, no fever, no penile discharge. pt also cut his own abd with si thoughts today    constipation possibly causing issues with urinating vs urinary retention vs anne-marie- labs, ct, will get psych since pt did try to hurt self and verbalize si

## 2023-11-20 NOTE — ED PROVIDER NOTE - PROGRESS NOTE DETAILS
Ayoub: no ua. ct shows no acute findings with bladder minimally distended. pt resting comfortably. had mckeno removed recently and asking to have it placed again - s/o to dr mckeon amrik: psych called and will see pt- [phone] [place] Patricia: pending psych recs. psych waiting to speak from someone from group home prior to dispo. Pam Pérez DO psych cleared the patient. Pt does not have any complains at this time. Group home staff Isaac is here in the ed - states she will take the patient back in uber.

## 2023-11-20 NOTE — ED PROVIDER NOTE - OBJECTIVE STATEMENT
Critical lab   Platelets 9, primary nurse and erp aware   33 yr old male with hx group home ([phone]) with PMH of DM, BPH, GERD, B/L Testicular cancer (s/p left testicular removal, right testicular removal), Hypothyroidism, Autism, Bipolar disorder, and Asthma presents to ed c/o constipation and inability to urinate since testicular removal. no vomiting, no fever, no penile discharge. 33 yr old male with hx group home ([phone]) with PMH of DM, BPH, GERD, B/L Testicular cancer (s/p left testicular removal, right testicular removal), Hypothyroidism, Autism, Bipolar disorder, and Asthma presents to ed c/o constipation and inability to urinate since testicular removal. no vomiting, no fever, no penile discharge. pt also cut his own abd with si thoughts today

## 2023-11-20 NOTE — ED PROVIDER NOTE - PATIENT PORTAL LINK FT
You can access the FollowMyHealth Patient Portal offered by [facility] by registering at the following website: [email or web address]. By joining [facility]’s FollowMyHealth portal, you will also be able to view your health information using other applications (apps) compatible with our system.

## 2023-11-20 NOTE — ED ADULT NURSE NOTE - NS ED NURSE LEVEL OF CONSCIOUSNESS MENTAL STATUS
"     Patient ID: Pearl Elizalde is a 63 y.o. female.    Chief Complaint:    Chief Complaint   Patient presents with   • Left Shoulder - Initial Evaluation, Pain     Pain 3        HPI:  This is a 63-year-old female here with about a month of left shoulder pain after feeling a pop adjusting herself in bed.  She developed pain deep in the shoulder problems lifting her arm.  With rest ice and oral medication she has improved about 50 to 75%  Past Medical History:   Diagnosis Date   • Asthma 6/3/2019   • Body mass index (BMI) of 30.0-30.9 in adult 2019   • Chronic obstructive pulmonary disease (HCC) 6/3/2019   • Diabetic peripheral neuropathy (HCC) 2019   • Gastroesophageal reflux disease 6/3/2019   • Hyperlipidemia    • Hypertension    • Low back pain    • Obesity        Past Surgical History:   Procedure Laterality Date   • ADENOIDECTOMY     • CHOLECYSTECTOMY     • COLONOSCOPY     • ENDOSCOPY     • INNER EAR SURGERY     • REPLACEMENT TOTAL KNEE Left    • REPLACEMENT TOTAL KNEE Right    • TONSILLECTOMY         Family History   Adopted: Yes          Social History     Occupational History   • Not on file   Tobacco Use   • Smoking status: Former Smoker     Packs/day: 0.20     Years: 0.50     Pack years: 0.10     Types: Cigarettes     Quit date:      Years since quittin.4   • Smokeless tobacco: Never Used   Vaping Use   • Vaping Use: Never used   Substance and Sexual Activity   • Alcohol use: Yes     Comment: Occassional   • Drug use: No   • Sexual activity: Yes     Partners: Male     Birth control/protection: None      Review of Systems   Cardiovascular: Negative for chest pain.   Musculoskeletal: Positive for arthralgias.       Objective:    /73   Pulse 90   Ht 167.6 cm (66\")   Wt 89.4 kg (197 lb)   LMP  (LMP Unknown)   BMI 31.80 kg/m²     Physical Examination:  Left shoulder demonstrates intact skin mild pain in the impingement area passive elevation 170 abduction 140 external tension 40 " internal rotation L5.  He has mild weakness but no pain on Speed Edwards supraspinatus testing.  Belly press and liftoff are 4/5.  Sensory and motor exam are intact all distributions. Radial pulse is palpable and capillary refill is less than two seconds to all digits.    Imaging:  Previous x-rays reviewed demonstrate moderate reduction of the acromiohumeral distance    Assessment:  Left shoulder pain likely chronic cuff tear    Plan:  Discussed natural history of her cuff tear and this likely exacerbation.  Would continue conservative treatment with stretching ice and medication as needed.  If symptoms do not improve adequately MRI      Procedures         Disclaimer: Part of this note may be an electronic transcription/translation of spoken language to printed text using the Dragon Dictation System   Awake/Alert/Cooperative

## 2023-11-20 NOTE — ED ADULT NURSE NOTE - OBJECTIVE STATEMENT
Patient reported of abdominal pain, s/p b/l testicle removal for testicular cancer on 10/30. Pt denies NVD. Pt report constipation x1 week with no BMs.

## 2023-11-20 NOTE — ED ADULT TRIAGE NOTE - CHIEF COMPLAINT QUOTE
C/o abdominal pain s/p b/l testicle removal for testicular cancer on 10/30. Pt denies NVD. Pt report constipation x1 week with no BMs.

## 2023-11-20 NOTE — ED PROVIDER NOTE - NSFOLLOWUPINSTRUCTIONS_ED_ALL_ED_FT
Suicidal Feelings: How to Help Yourself  Suicide is when you end your own life. Suicidal ideation includes expressing thoughts about, or a preoccupation with, ending your own life. There are many things you can do to help yourself feel better when struggling with these feelings. Many services and people are available to support you and others who struggle with similar feelings.    If you ever feel like you may hurt yourself or others, or have thoughts about taking your own life, get help right away. To get help:  Go to your nearest emergency department.  Call your local emergency services (811 in the [place]  Call the [facility] and [facility] services helpline (623 in the [place]  Call or text a suicide hotline to speak with a trained counselor. The following suicide hotlines are available in the [place]:  5-770-021-TALK (1-607.749.1139 or 791 in the [place]  4-197-SISEVPB (1-634.474.9758).  Text 819681. This is the Crisis Text Line in the [place]  1-708.415.7399. This is a hotline for Bengali speakers.  1-824.184.7991. This is a hotline for TTY users.  0-438-7-U-DELROY (1-779.669.7944). This is a hotline for lesbian, jones, bisexual, transgender, or questioning youth.  For a list of hotlines in [place], visit suicide.org/hotlines/international/rylgng-kekmywy-ochrdspm.html  Contact a crisis center or a local suicide prevention center. To find a crisis center or suicide prevention center:  Call your local hospital, clinic, community service organization, mental health center, social service provider, or health department. Ask for help with connecting to a crisis center.  For a list of crisis centers in the [place], visit: suicidepreventionlifeline.org  For a list of crisis centers in [place], visit: suicideprevention.ca  How to help yourself feel better  A teen talking with an adult.  Promise yourself that you will not do anything bad or extreme when you have suicidal feelings. Remember the times you have felt hopeful.  Many people have gotten through suicidal thoughts and feelings, and you can too.  If you have had these feelings before, remind yourself that you can get through them again.  Let family, friends, teachers, or counselors know how you are feeling. Do not separate yourself from those who care about you and want to help you.  Talk with someone every day, even if you do not feel like talking to anyone or being with other people.  Face-to-face conversation is best to help them understand your feelings.  Contact a mental health care provider and work with this person regularly.  Make a safety plan that you can follow during a crisis.  Include phone numbers of suicide prevention hotlines, mental health professionals, and trusted friends and family members you can call during an emergency.  Save these numbers on your phone.  If you are thinking of taking a lot of medicine, give your medicine to someone who can give it to you as prescribed.  If you are on antidepressants and are concerned you will overdose, tell your health care provider so that he or she can give you safer medicines.  Try to stick to your routines and follow a schedule every day. Make self-care a priority.  Make a list of realistic goals, and cross them off when you achieve them. Accomplishments can give you a sense of worth.  Wait until you are feeling better before doing things that you find difficult or unpleasant.  Do things that you have always enjoyed to take your mind off your feelings.  Try reading a book, or listening to or playing music.  Spending time outside, in nature, may help you feel better.  Follow these instructions at home:  A sign asking the reader not to drink beer, wine, or hard liquor.   Visit your primary health care provider every year for a physical and a mental health checkup.  Take over-the-counter and prescription medicines only as told by your health care provider.  Ask your health care provider about the possible side effects of any medicines you are taking.  Ask your health care provider about whether suicidal ideation is a possible side effect of any of your medicines.  Learn about suicidal ideation and what increases the risk for the development of suicidal thoughts.  Eat a well-balanced diet, and eat regular meals.  Get plenty of rest.  Exercise if you are able. Just 30 minutes of exercise each day can help you feel better.  Keep your living space well lit.  Do not use alcohol or drugs. Remove these substances from your home.  General recommendations  Remove weapons, poisons, knives, and other deadly items from your home.  Work with a mental health care provider as needed.  When you are feeling well, write yourself a letter with tips and support that you can read when you are not feeling well.  Remember that life's difficulties can be sorted out with help. Conditions can be treated, and you can learn behaviors and ways of thinking that will help you.  Work with your health care provider or counselor to learn ways of coping with your thoughts and feelings.  Where to find more information  National Suicide Prevention Lifeline: www.suicidepreventionlifeline.org  Hopeline: www.hopeline.[place]  American Foundation for Suicide Prevention: www.afsp.org  The [place] Project (for lesbian, jones, bisexual, transgender, or questioning youth): www.thetrevorproject.org  National [place] of Mental Health: www.nimh.nih.gov/health/topics/suicide-prevention  Suicide Prevention Resources: afsp.org/suicide-prevention-resources  Contact a health care provider if:  You feel as though you are a burden to others.  You feel agitated, angry, vengeful, or have extreme mood swings.  You have withdrawn from family and friends.  You are frequently using drugs or alcohol.  Get help right away if:  You are talking about suicide or wishing to die.  You start making plans for how to commit suicide.  You feel that you have no reason to live.  You start making plans for putting your affairs in order, saying goodbye, or giving your possessions away.  You feel guilt, shame, or unbearable pain, and it seems like there is no way out.  You are engaging in risky behaviors that could lead to death.  If you have any of these thoughts or symptoms, get help right away:  Go to your nearest emergency department or crisis center.  Call emergency services (911 in the [place]  Call or text a suicide crisis helpline.  Summary  Suicide is when you take your own life. Suicidal feelings are thoughts about ending your own life.  Promise yourself that you will not do anything bad or extreme when you have suicidal feelings.  Let family, friends, teachers, or counselors know how you are feeling.  Get help right away if you start making plans for how to commit suicide.

## 2023-11-21 ENCOUNTER — TRANSCRIPTION ENCOUNTER (OUTPATIENT)
Age: 33
End: 2023-11-21

## 2023-11-21 VITALS
RESPIRATION RATE: 17 BRPM | HEART RATE: 95 BPM | TEMPERATURE: 98 F | DIASTOLIC BLOOD PRESSURE: 81 MMHG | SYSTOLIC BLOOD PRESSURE: 120 MMHG | OXYGEN SATURATION: 100 %

## 2023-11-21 LAB
APPEARANCE UR: CLEAR — SIGNIFICANT CHANGE UP
APPEARANCE UR: CLEAR — SIGNIFICANT CHANGE UP
BILIRUB UR-MCNC: NEGATIVE — SIGNIFICANT CHANGE UP
BILIRUB UR-MCNC: NEGATIVE — SIGNIFICANT CHANGE UP
COLOR SPEC: YELLOW — SIGNIFICANT CHANGE UP
COLOR SPEC: YELLOW — SIGNIFICANT CHANGE UP
DIFF PNL FLD: NEGATIVE — SIGNIFICANT CHANGE UP
DIFF PNL FLD: NEGATIVE — SIGNIFICANT CHANGE UP
GLUCOSE UR QL: NEGATIVE MG/DL — SIGNIFICANT CHANGE UP
GLUCOSE UR QL: NEGATIVE MG/DL — SIGNIFICANT CHANGE UP
KETONES UR-MCNC: ABNORMAL MG/DL
KETONES UR-MCNC: ABNORMAL MG/DL
LEUKOCYTE ESTERASE UR-ACNC: NEGATIVE — SIGNIFICANT CHANGE UP
LEUKOCYTE ESTERASE UR-ACNC: NEGATIVE — SIGNIFICANT CHANGE UP
NITRITE UR-MCNC: NEGATIVE — SIGNIFICANT CHANGE UP
NITRITE UR-MCNC: NEGATIVE — SIGNIFICANT CHANGE UP
PH UR: 6 — SIGNIFICANT CHANGE UP (ref 5–8)
PH UR: 6 — SIGNIFICANT CHANGE UP (ref 5–8)
PROT UR-MCNC: NEGATIVE MG/DL — SIGNIFICANT CHANGE UP
PROT UR-MCNC: NEGATIVE MG/DL — SIGNIFICANT CHANGE UP
SP GR SPEC: 1.04 — HIGH (ref 1–1.03)
SP GR SPEC: 1.04 — HIGH (ref 1–1.03)
UROBILINOGEN FLD QL: 0.2 MG/DL — SIGNIFICANT CHANGE UP (ref 0.2–1)
UROBILINOGEN FLD QL: 0.2 MG/DL — SIGNIFICANT CHANGE UP (ref 0.2–1)

## 2023-11-21 PROCEDURE — 80307 DRUG TEST PRSMV CHEM ANLYZR: CPT

## 2023-11-21 PROCEDURE — 85025 COMPLETE CBC W/AUTO DIFF WBC: CPT

## 2023-11-21 PROCEDURE — 74177 CT ABD & PELVIS W/CONTRAST: CPT | Mod: MA

## 2023-11-21 PROCEDURE — 36415 COLL VENOUS BLD VENIPUNCTURE: CPT

## 2023-11-21 PROCEDURE — 80053 COMPREHEN METABOLIC PANEL: CPT

## 2023-11-21 PROCEDURE — 90792 PSYCH DIAG EVAL W/MED SRVCS: CPT | Mod: 95

## 2023-11-21 PROCEDURE — 99285 EMERGENCY DEPT VISIT HI MDM: CPT | Mod: 25

## 2023-11-21 PROCEDURE — 96360 HYDRATION IV INFUSION INIT: CPT | Mod: XU

## 2023-11-21 PROCEDURE — 81003 URINALYSIS AUTO W/O SCOPE: CPT

## 2023-11-21 NOTE — ED PROVIDER NOTE - CARE PLAN
Principal Discharge DX:	Masturbation Skyrizi Counseling: I discussed with the patient the risks of risankizumab-rzaa including but not limited to immunosuppression, and serious infections.  The patient understands that monitoring is required including a PPD at baseline and must alert us or the primary physician if symptoms of infection or other concerning signs are noted.

## 2023-11-21 NOTE — ED ADULT NURSE REASSESSMENT NOTE - NS ED NURSE REASSESS COMMENT FT1
Patient refused EKG, bladder scan, and vital signs. MD Gomez made aware. No distress noted, patient in stable condition. Patient able to urinate clear yellow urine with no difficulty.
34 y/o male received awake, alert and oriented x3, in no acute distress. Presented to ED for abdominal pain/ constipation. SI attempt, patient slashed abdomen with unknown object. 3 inch excoriation noted to abdomen. No bleeding noted. Vitals presently stable Patient is calm, and cooperative at present moment. Pending Psych eval.

## 2023-11-21 NOTE — ED BEHAVIORAL HEALTH ASSESSMENT NOTE - SUMMARY
34 yo male with PPHx of IDD and Bipolar DO residing in a group home came to ED for constipation and inability to urinate. He endorsed SI and reported NSSIB of cutting his abdomen.     Patient presented to ED for medical problems of abdominal pain, but endorsed SI with NSSIB. He did not engage in meaningful evaluation which was limited due to him being sleepy and partly by his IDD. He initially refused SI and NSSIB, but later contradicted himself admitting to them and asking for an inpatient admission for unclear reasons. he did not engage to provide insight into his SI or NSSIB. Further collaterals from him group home and reassessment will aide in making a treatment recommendation. Will hold him in ED till then.

## 2023-11-21 NOTE — ED BEHAVIORAL HEALTH NOTE - BEHAVIORAL HEALTH NOTE
==================             PRE-HOSPITAL COURSE             ===================            SOURCE:  Secondhand EMR documentation.             DETAILS:  Patient BIBEMS; chief complaint of difficulty urinating/constipation.         ===========      ED COURSE:            ===========             SOURCE:  RN and secondhand EMR documentation.              ARRIVAL:  Patient noted to be cooperative with ED protocol. Patient gowned, wanded, and placed in private room on 1:1 for consult. Patient presents with good hygiene/grooming. Per ER attending handoff patient has superficial cut to abdomen on right side.             BELONGINGS:  None notable.             BEHAVIOR: Blood provided for routine labs without noted incident; urine not provided as of yet. Patient endorses SI to provider, no plan stated. No SI/HI/AH/VH elicited per RN. Patient is alert, orientedx4, and makes eye contact; speech of normal volume and rate accompanied by logical thought process. Patient has been in hospital bed while in ED; presents as calm and interacting appropriately with ED staff.         TREATMENT: Patient has not required medication intervention while in ED; remains in behavioral control.      Visitors: Patient presently unaccompanied by social supports in ED.

## 2023-11-21 NOTE — ED ADULT NURSE REASSESSMENT NOTE - NSFALLHARMRISKINTERV_ED_ALL_ED

## 2023-11-21 NOTE — ED BEHAVIORAL HEALTH ASSESSMENT NOTE - DESCRIPTION
Pt was limitedly cooperative Lives in a group home. [place]. Family in [place] Testicular CA, GERD, DM, Hypothyroidism, Asthma

## 2023-11-28 NOTE — ED PROVIDER NOTE - PROGRESS NOTE
gastric pull-up redemonstrated, better evaluated on prior CT chest. Stable trace left pleural effusion along with some mild likely scarring versus atelectasis to the left lung base. Lungs otherwise appear to be clear. No pulmonary edema or pneumothoraces. Solid pulmonary nodule to right upper lobe redemonstrated, better appreciated on prior CT chest. Cardiac and mediastinal silhouettes are stable. No acute bony abnormality. Chronic postsurgical change to posterior right 6th rib redemonstrated. No new or worsening acute cardiopulmonary process as compared to prior CT chest 10/04/2023. Stable small left pleural effusion along with mild left basilar likely scarring or atelectasis. Stable pulmonary nodule to right upper lobe, better evaluated on prior CT chest.  Reference to that study can be made for additional information.          Signed:    Hardy Alvarez MD   11/28/2023
Stable.

## 2023-12-08 NOTE — BH CONSULTATION LIAISON ASSESSMENT NOTE - SUBSTANCE USE
Medication Question or Refill    Contacts         Type Contact Phone/Fax    12/08/2023 11:47 AM CST Phone (Incoming) Anne Ferreira (Self) [phone] (M)            What medication are you calling about (include dose and sig)?: dexmethylphenidate (FOCALIN) 10 MG table     Preferred Pharmacy:Needs this rx sent to [facility] in [facility] as her previous pharmacy did not have this in stock.  Pt going out of town today, would like it called in today if possible.        Could we send this information to you in [facility] or would you prefer to receive a phone call?:   Patient would prefer a phone call   Okay to leave a detailed message?: Yes at Cell number on file:    Telephone Information:   Mobile [phone]       Courtney Devlin on 12/8/2023 at 11:52 AM     Yes

## 2023-12-13 ENCOUNTER — APPOINTMENT (OUTPATIENT)
Dept: HEMATOLOGY ONCOLOGY | Facility: CLINIC | Age: 33
End: 2023-12-13

## 2023-12-15 NOTE — BH INPATIENT PSYCHIATRY ASSESSMENT NOTE - NSCURPASTPSYDX_PSY_ALL_CORE
Mood disorder MEDICATIONS  (STANDING):  albuterol/ipratropium for Nebulization 3 milliLiter(s) Nebulizer every 6 hours  ceFAZolin   IVPB      ceFAZolin   IVPB 2000 milliGRAM(s) IV Intermittent every 8 hours  divalproex Sprinkle 125 milliGRAM(s) Oral two times a day  enoxaparin Injectable 40 milliGRAM(s) SubCutaneous every 24 hours  influenza  Vaccine (HIGH DOSE) 0.7 milliLiter(s) IntraMuscular once  lactulose Syrup 20 Gram(s) Oral two times a day  melatonin 3 milliGRAM(s) Oral at bedtime  midodrine 10 milliGRAM(s) Oral every 8 hours  QUEtiapine 225 milliGRAM(s) Oral two times a day  tamsulosin 0.4 milliGRAM(s) Oral at bedtime    MEDICATIONS  (PRN):  acetaminophen     Tablet .. 650 milliGRAM(s) Oral every 6 hours PRN Temp greater or equal to 38C (100.4F), Mild Pain (1 - 3)  OLANZapine Injectable 2.5 milliGRAM(s) IntraMuscular every 6 hours PRN severe agitation  senna 2 Tablet(s) Oral at bedtime PRN Constipation

## 2023-12-18 NOTE — ED ADULT NURSE NOTE - NSIMPLEMENTINTERV_GEN_ALL_ED
Implemented All Universal Safety Interventions:  [place] to call system. Call bell, personal items and telephone within reach. Instruct patient to call for assistance. Room bathroom lighting operational. Non-slip footwear when patient is off stretcher. Physically safe environment: no spills, clutter or unnecessary equipment. Stretcher in lowest position, wheels locked, appropriate side rails in place.
never used

## 2023-12-28 ENCOUNTER — EMERGENCY (EMERGENCY)
Facility: HOSPITAL | Age: 33
LOS: 1 days | Discharge: HOME | End: 2023-12-28
Attending: EMERGENCY MEDICINE
Payer: MEDICAID

## 2023-12-28 VITALS
RESPIRATION RATE: 17 BRPM | OXYGEN SATURATION: 95 % | TEMPERATURE: 98 F | HEART RATE: 92 BPM | SYSTOLIC BLOOD PRESSURE: 129 MMHG | DIASTOLIC BLOOD PRESSURE: 69 MMHG | HEIGHT: 71 IN

## 2023-12-28 VITALS
DIASTOLIC BLOOD PRESSURE: 74 MMHG | OXYGEN SATURATION: 95 % | SYSTOLIC BLOOD PRESSURE: 109 MMHG | HEART RATE: 79 BPM | TEMPERATURE: 98 F | RESPIRATION RATE: 18 BRPM

## 2023-12-28 DIAGNOSIS — Z90.79 ACQUIRED ABSENCE OF OTHER GENITAL ORGAN(S): Chronic | ICD-10-CM

## 2023-12-28 LAB
ALBUMIN SERPL ELPH-MCNC: 3.6 G/DL — SIGNIFICANT CHANGE UP (ref 3.5–5)
ALBUMIN SERPL ELPH-MCNC: 3.6 G/DL — SIGNIFICANT CHANGE UP (ref 3.5–5)
ALP SERPL-CCNC: 98 U/L — SIGNIFICANT CHANGE UP (ref 40–120)
ALP SERPL-CCNC: 98 U/L — SIGNIFICANT CHANGE UP (ref 40–120)
ALT FLD-CCNC: 71 U/L DA — HIGH (ref 10–60)
ALT FLD-CCNC: 71 U/L DA — HIGH (ref 10–60)
AMPHET UR-MCNC: NEGATIVE — SIGNIFICANT CHANGE UP
AMPHET UR-MCNC: NEGATIVE — SIGNIFICANT CHANGE UP
ANION GAP SERPL CALC-SCNC: 10 MMOL/L — SIGNIFICANT CHANGE UP (ref 5–17)
ANION GAP SERPL CALC-SCNC: 10 MMOL/L — SIGNIFICANT CHANGE UP (ref 5–17)
APAP SERPL-MCNC: <2 UG/ML — LOW (ref 10–30)
APAP SERPL-MCNC: <2 UG/ML — LOW (ref 10–30)
APPEARANCE UR: CLEAR — SIGNIFICANT CHANGE UP
APPEARANCE UR: CLEAR — SIGNIFICANT CHANGE UP
AST SERPL-CCNC: 26 U/L — SIGNIFICANT CHANGE UP (ref 10–40)
AST SERPL-CCNC: 26 U/L — SIGNIFICANT CHANGE UP (ref 10–40)
BACTERIA # UR AUTO: ABNORMAL /HPF
BACTERIA # UR AUTO: ABNORMAL /HPF
BARBITURATES UR SCN-MCNC: NEGATIVE — SIGNIFICANT CHANGE UP
BARBITURATES UR SCN-MCNC: NEGATIVE — SIGNIFICANT CHANGE UP
BASOPHILS # BLD AUTO: 0.04 K/UL — SIGNIFICANT CHANGE UP (ref 0–0.2)
BASOPHILS # BLD AUTO: 0.04 K/UL — SIGNIFICANT CHANGE UP (ref 0–0.2)
BASOPHILS NFR BLD AUTO: 0.5 % — SIGNIFICANT CHANGE UP (ref 0–2)
BASOPHILS NFR BLD AUTO: 0.5 % — SIGNIFICANT CHANGE UP (ref 0–2)
BENZODIAZ UR-MCNC: NEGATIVE — SIGNIFICANT CHANGE UP
BENZODIAZ UR-MCNC: NEGATIVE — SIGNIFICANT CHANGE UP
BILIRUB SERPL-MCNC: 0.2 MG/DL — SIGNIFICANT CHANGE UP (ref 0.2–1.2)
BILIRUB SERPL-MCNC: 0.2 MG/DL — SIGNIFICANT CHANGE UP (ref 0.2–1.2)
BILIRUB UR-MCNC: NEGATIVE — SIGNIFICANT CHANGE UP
BILIRUB UR-MCNC: NEGATIVE — SIGNIFICANT CHANGE UP
BUN SERPL-MCNC: 14 MG/DL — SIGNIFICANT CHANGE UP (ref 7–18)
BUN SERPL-MCNC: 14 MG/DL — SIGNIFICANT CHANGE UP (ref 7–18)
CALCIUM SERPL-MCNC: 8.9 MG/DL — SIGNIFICANT CHANGE UP (ref 8.4–10.5)
CALCIUM SERPL-MCNC: 8.9 MG/DL — SIGNIFICANT CHANGE UP (ref 8.4–10.5)
CHLORIDE SERPL-SCNC: 102 MMOL/L — SIGNIFICANT CHANGE UP (ref 96–108)
CHLORIDE SERPL-SCNC: 102 MMOL/L — SIGNIFICANT CHANGE UP (ref 96–108)
CO2 SERPL-SCNC: 27 MMOL/L — SIGNIFICANT CHANGE UP (ref 22–31)
CO2 SERPL-SCNC: 27 MMOL/L — SIGNIFICANT CHANGE UP (ref 22–31)
COCAINE METAB.OTHER UR-MCNC: NEGATIVE — SIGNIFICANT CHANGE UP
COCAINE METAB.OTHER UR-MCNC: NEGATIVE — SIGNIFICANT CHANGE UP
COLOR SPEC: SIGNIFICANT CHANGE UP
COLOR SPEC: SIGNIFICANT CHANGE UP
CREAT SERPL-MCNC: 1.21 MG/DL — SIGNIFICANT CHANGE UP (ref 0.5–1.3)
CREAT SERPL-MCNC: 1.21 MG/DL — SIGNIFICANT CHANGE UP (ref 0.5–1.3)
DIFF PNL FLD: NEGATIVE — SIGNIFICANT CHANGE UP
DIFF PNL FLD: NEGATIVE — SIGNIFICANT CHANGE UP
EGFR: 81 ML/MIN/1.73M2 — SIGNIFICANT CHANGE UP
EGFR: 81 ML/MIN/1.73M2 — SIGNIFICANT CHANGE UP
EOSINOPHIL # BLD AUTO: 0.57 K/UL — HIGH (ref 0–0.5)
EOSINOPHIL # BLD AUTO: 0.57 K/UL — HIGH (ref 0–0.5)
EOSINOPHIL NFR BLD AUTO: 7.3 % — HIGH (ref 0–6)
EOSINOPHIL NFR BLD AUTO: 7.3 % — HIGH (ref 0–6)
ETHANOL SERPL-MCNC: 11 MG/DL — HIGH (ref 0–10)
ETHANOL SERPL-MCNC: 11 MG/DL — HIGH (ref 0–10)
GLUCOSE SERPL-MCNC: 129 MG/DL — HIGH (ref 70–99)
GLUCOSE SERPL-MCNC: 129 MG/DL — HIGH (ref 70–99)
GLUCOSE UR QL: NEGATIVE MG/DL — SIGNIFICANT CHANGE UP
GLUCOSE UR QL: NEGATIVE MG/DL — SIGNIFICANT CHANGE UP
HCT VFR BLD CALC: 41.7 % — SIGNIFICANT CHANGE UP (ref 39–50)
HCT VFR BLD CALC: 41.7 % — SIGNIFICANT CHANGE UP (ref 39–50)
HGB BLD-MCNC: 13.2 G/DL — SIGNIFICANT CHANGE UP (ref 13–17)
HGB BLD-MCNC: 13.2 G/DL — SIGNIFICANT CHANGE UP (ref 13–17)
IMM GRANULOCYTES NFR BLD AUTO: 1.3 % — HIGH (ref 0–0.9)
IMM GRANULOCYTES NFR BLD AUTO: 1.3 % — HIGH (ref 0–0.9)
KETONES UR-MCNC: ABNORMAL MG/DL
KETONES UR-MCNC: ABNORMAL MG/DL
LEUKOCYTE ESTERASE UR-ACNC: NEGATIVE — SIGNIFICANT CHANGE UP
LEUKOCYTE ESTERASE UR-ACNC: NEGATIVE — SIGNIFICANT CHANGE UP
LYMPHOCYTES # BLD AUTO: 2.65 K/UL — SIGNIFICANT CHANGE UP (ref 1–3.3)
LYMPHOCYTES # BLD AUTO: 2.65 K/UL — SIGNIFICANT CHANGE UP (ref 1–3.3)
LYMPHOCYTES # BLD AUTO: 33.8 % — SIGNIFICANT CHANGE UP (ref 13–44)
LYMPHOCYTES # BLD AUTO: 33.8 % — SIGNIFICANT CHANGE UP (ref 13–44)
MCHC RBC-ENTMCNC: 24.4 PG — LOW (ref 27–34)
MCHC RBC-ENTMCNC: 24.4 PG — LOW (ref 27–34)
MCHC RBC-ENTMCNC: 31.7 GM/DL — LOW (ref 32–36)
MCHC RBC-ENTMCNC: 31.7 GM/DL — LOW (ref 32–36)
MCV RBC AUTO: 77.2 FL — LOW (ref 80–100)
MCV RBC AUTO: 77.2 FL — LOW (ref 80–100)
METHADONE UR-MCNC: NEGATIVE — SIGNIFICANT CHANGE UP
METHADONE UR-MCNC: NEGATIVE — SIGNIFICANT CHANGE UP
MONOCYTES # BLD AUTO: 0.5 K/UL — SIGNIFICANT CHANGE UP (ref 0–0.9)
MONOCYTES # BLD AUTO: 0.5 K/UL — SIGNIFICANT CHANGE UP (ref 0–0.9)
MONOCYTES NFR BLD AUTO: 6.4 % — SIGNIFICANT CHANGE UP (ref 2–14)
MONOCYTES NFR BLD AUTO: 6.4 % — SIGNIFICANT CHANGE UP (ref 2–14)
NEUTROPHILS # BLD AUTO: 3.99 K/UL — SIGNIFICANT CHANGE UP (ref 1.8–7.4)
NEUTROPHILS # BLD AUTO: 3.99 K/UL — SIGNIFICANT CHANGE UP (ref 1.8–7.4)
NEUTROPHILS NFR BLD AUTO: 50.7 % — SIGNIFICANT CHANGE UP (ref 43–77)
NEUTROPHILS NFR BLD AUTO: 50.7 % — SIGNIFICANT CHANGE UP (ref 43–77)
NITRITE UR-MCNC: NEGATIVE — SIGNIFICANT CHANGE UP
NITRITE UR-MCNC: NEGATIVE — SIGNIFICANT CHANGE UP
NRBC # BLD: 0 /100 WBCS — SIGNIFICANT CHANGE UP (ref 0–0)
NRBC # BLD: 0 /100 WBCS — SIGNIFICANT CHANGE UP (ref 0–0)
OPIATES UR-MCNC: NEGATIVE — SIGNIFICANT CHANGE UP
OPIATES UR-MCNC: NEGATIVE — SIGNIFICANT CHANGE UP
PCP SPEC-MCNC: SIGNIFICANT CHANGE UP
PCP SPEC-MCNC: SIGNIFICANT CHANGE UP
PCP UR-MCNC: NEGATIVE — SIGNIFICANT CHANGE UP
PCP UR-MCNC: NEGATIVE — SIGNIFICANT CHANGE UP
PH UR: 6 — SIGNIFICANT CHANGE UP (ref 5–8)
PH UR: 6 — SIGNIFICANT CHANGE UP (ref 5–8)
PLATELET # BLD AUTO: 238 K/UL — SIGNIFICANT CHANGE UP (ref 150–400)
PLATELET # BLD AUTO: 238 K/UL — SIGNIFICANT CHANGE UP (ref 150–400)
POTASSIUM SERPL-MCNC: 4.1 MMOL/L — SIGNIFICANT CHANGE UP (ref 3.5–5.3)
POTASSIUM SERPL-MCNC: 4.1 MMOL/L — SIGNIFICANT CHANGE UP (ref 3.5–5.3)
POTASSIUM SERPL-SCNC: 4.1 MMOL/L — SIGNIFICANT CHANGE UP (ref 3.5–5.3)
POTASSIUM SERPL-SCNC: 4.1 MMOL/L — SIGNIFICANT CHANGE UP (ref 3.5–5.3)
PROT SERPL-MCNC: 7.2 G/DL — SIGNIFICANT CHANGE UP (ref 6–8.3)
PROT SERPL-MCNC: 7.2 G/DL — SIGNIFICANT CHANGE UP (ref 6–8.3)
PROT UR-MCNC: ABNORMAL MG/DL
PROT UR-MCNC: ABNORMAL MG/DL
RBC # BLD: 5.4 M/UL — SIGNIFICANT CHANGE UP (ref 4.2–5.8)
RBC # BLD: 5.4 M/UL — SIGNIFICANT CHANGE UP (ref 4.2–5.8)
RBC # FLD: 14.7 % — HIGH (ref 10.3–14.5)
RBC # FLD: 14.7 % — HIGH (ref 10.3–14.5)
RBC CASTS # UR COMP ASSIST: 2 /HPF — SIGNIFICANT CHANGE UP (ref 0–4)
RBC CASTS # UR COMP ASSIST: 2 /HPF — SIGNIFICANT CHANGE UP (ref 0–4)
SALICYLATES SERPL-MCNC: <1.7 MG/DL — LOW (ref 2.8–20)
SALICYLATES SERPL-MCNC: <1.7 MG/DL — LOW (ref 2.8–20)
SARS-COV-2 RNA SPEC QL NAA+PROBE: SIGNIFICANT CHANGE UP
SARS-COV-2 RNA SPEC QL NAA+PROBE: SIGNIFICANT CHANGE UP
SODIUM SERPL-SCNC: 139 MMOL/L — SIGNIFICANT CHANGE UP (ref 135–145)
SODIUM SERPL-SCNC: 139 MMOL/L — SIGNIFICANT CHANGE UP (ref 135–145)
SP GR SPEC: 1.03 — HIGH (ref 1–1.03)
SP GR SPEC: 1.03 — HIGH (ref 1–1.03)
THC UR QL: NEGATIVE — SIGNIFICANT CHANGE UP
THC UR QL: NEGATIVE — SIGNIFICANT CHANGE UP
UROBILINOGEN FLD QL: 1 MG/DL — SIGNIFICANT CHANGE UP (ref 0.2–1)
UROBILINOGEN FLD QL: 1 MG/DL — SIGNIFICANT CHANGE UP (ref 0.2–1)
WBC # BLD: 7.85 K/UL — SIGNIFICANT CHANGE UP (ref 3.8–10.5)
WBC # BLD: 7.85 K/UL — SIGNIFICANT CHANGE UP (ref 3.8–10.5)
WBC # FLD AUTO: 7.85 K/UL — SIGNIFICANT CHANGE UP (ref 3.8–10.5)
WBC # FLD AUTO: 7.85 K/UL — SIGNIFICANT CHANGE UP (ref 3.8–10.5)
WBC UR QL: 4 /HPF — SIGNIFICANT CHANGE UP (ref 0–5)
WBC UR QL: 4 /HPF — SIGNIFICANT CHANGE UP (ref 0–5)

## 2023-12-28 PROCEDURE — 36415 COLL VENOUS BLD VENIPUNCTURE: CPT

## 2023-12-28 PROCEDURE — 99285 EMERGENCY DEPT VISIT HI MDM: CPT

## 2023-12-28 PROCEDURE — 87635 SARS-COV-2 COVID-19 AMP PRB: CPT

## 2023-12-28 PROCEDURE — 80053 COMPREHEN METABOLIC PANEL: CPT

## 2023-12-28 PROCEDURE — 81001 URINALYSIS AUTO W/SCOPE: CPT

## 2023-12-28 PROCEDURE — 85025 COMPLETE CBC W/AUTO DIFF WBC: CPT

## 2023-12-28 PROCEDURE — 80307 DRUG TEST PRSMV CHEM ANLYZR: CPT

## 2023-12-28 PROCEDURE — 93005 ELECTROCARDIOGRAM TRACING: CPT

## 2023-12-28 RX ORDER — ACETAMINOPHEN 500 MG
650 TABLET ORAL ONCE
Refills: 0 | Status: COMPLETED | OUTPATIENT
Start: 2023-12-28 | End: 2023-12-28

## 2023-12-28 RX ADMIN — Medication 650 MILLIGRAM(S): at 18:04

## 2023-12-28 NOTE — ED ADULT NURSE NOTE - OBJECTIVE STATEMENT
Pt presents to the ED  c/o SI, Pt currently denies having a plan, Pt denies HI. Pt reports biting skin "earlier today." Wounds to B/L hands noted. Pt was put on a one to one constant observation for safety. Pt presents to the ED  c/o self harming thoughts that started "this morning" as per Pt. , Pt currently denies SI in the ED upon RN assessment. Pt denies having a plan, Pt denies HI. Pt reports biting skin "earlier today." Wounds to B/L hands noted. Pt was put on a one to one constant observation for safety. Pt presents to the ED  c/o self harming thoughts that started "this morning" as per Pt. , Pt currently denies SI in the ED upon RN assessment. Pt denies having a plan, Pt denies HI. Pt states "I cut my hands with a knife earlier today." Wounds to top of B/L hands noted. Pt was put on a one to one constant observation for safety.

## 2023-12-28 NOTE — ED PROVIDER NOTE - NSFOLLOWUPINSTRUCTIONS_ED_ALL_ED_FT
Please follow up with your primary care doctor and surgeon in 1-2 days.  Please return to the emergency department if you have worsening pain, vomiting, fever, black or bloody stool, or any other symptoms.      Depression    WHAT YOU NEED TO KNOW:    What is depression? Depression is a mood disorder that causes feelings of sadness or hopelessness that do not go away. Depression may cause you to lose interest in things you used to enjoy. These feelings may interfere with your daily life.    What causes or increases my risk for depression? Depression may be caused by changes in brain chemicals that affect your mood. Your risk for depression may be higher if you have any of the following:    Stressful events such as the death of a loved one, unemployment, or divorce    A family history of depression    A chronic medical condition, such as diabetes, heart disease, or cancer    Drug or alcohol use  What are the signs and symptoms of depression?    Appetite changes, or weight gain or loss    Trouble falling or staying asleep, or sleeping too much    Fatigue (being mentally and physically tired) or lack of energy    Feeling restless, irritable, or withdrawn    Feeling worthless, hopeless, discouraged, or guilty    Trouble concentrating, remembering things, doing daily tasks, or making decisions    Thoughts about hurting or killing yourself  How is depression diagnosed? Your healthcare provider will ask about your symptoms and how long you have had them. Your provider will ask if you have any family members with depression. Tell your provider about any stressful events in your life. Your provider may ask about any other health conditions or medicines you take.    How is depression treated? Treatment depends on how severe your symptoms are. You may need any of the following:    Cognitive behavioral therapy (CBT) teaches you how to identify and change negative thought patterns.    Antidepressant medicine may be given to decrease or manage symptoms. You may need to take this medicine for several weeks before they start working. Tell your provider about any side effects or problems you have with your medicine. The type or amount of medicine may need to be changed.  What can I do to manage depression?    Talk to someone about your depression. Your healthcare provider may suggest counseling. You might feel more comfortable talking with a friend or family member about your depression. Choose someone you know will be supportive and encouraging.    Get regular physical activity. Physical activity can lower your stress, improve your mood, and help you sleep better. Work with your healthcare provider to develop a plan that you enjoy.   FAMILY WALKING FOR EXERCISE      Create a regular sleep schedule. A routine can help you relax before bed. Listen to music, read, or do yoga. Try to go to bed and wake up at the same time every day. Sleep is important for emotional health.    Eat a variety of healthy foods. Healthy foods include fruits, vegetables, whole-grain breads, low-fat dairy products, lean meats, fish, and cooked beans. A healthy meal plan is low in fat, salt, and added sugar.  Healthy Foods      Do not use alcohol, drugs, or nicotine products. These can worsen depression or make it hard to manage. Talk to your therapist or healthcare provider if you use any of these products and need help to quit.  The following resources are available at any time to help you, if needed:    Contact a suicide prevention organization:  For the [facility] Suicide and Crisis Lifeline:  Call or text [facility]    Send a chat on https://BookingNest.org/chat    Call 1-833.297.1069 (1-800-273-TALK)    For the Suicide Hotline, call 1-105.738.7422 (3-362-FACKWKS)    For a list of international numbers: https://save.org/find-help/international-resources/  Where can I find more information or support?    [facility] [place] [facility]ness  [address]  [place], [place]  Phone: 1-972.756.1954  Phone: 1-671.344.7032  Web Address: http://www.shefali.Tred  [facility] Suicide and Crisis Lifeline  [address]  [place], [place]  Phone: 5-943-867  Web Address: http://www.suicidepreventionlifeline.org OR https://Yanado/chat/  Call your local emergency number (911 in the [place]) if:    You think about hurting yourself or someone else.    You have done something on purpose to hurt yourself.  When should I call my therapist or doctor?    Your symptoms get worse or do not get better with treatment.    Your depression keeps you from doing your regular daily activities.    You have new symptoms since your last visit.    You have questions or concerns about your condition or care.  CARE AGREEMENT:    You have the right to help plan your care. Learn about your health condition and how it may be treated. Discuss treatment options with your healthcare providers to decide what care you want to receive. You always have the right to refuse treatment. Please follow up with your primary care doctor and surgeon in 1-2 days.  Please return to the emergency department if you have worsening pain, vomiting, fever, black or bloody stool, or any other symptoms.      Depression    WHAT YOU NEED TO KNOW:    What is depression? Depression is a mood disorder that causes feelings of sadness or hopelessness that do not go away. Depression may cause you to lose interest in things you used to enjoy. These feelings may interfere with your daily life.    What causes or increases my risk for depression? Depression may be caused by changes in brain chemicals that affect your mood. Your risk for depression may be higher if you have any of the following:    Stressful events such as the death of a loved one, unemployment, or divorce    A family history of depression    A chronic medical condition, such as diabetes, heart disease, or cancer    Drug or alcohol use  What are the signs and symptoms of depression?    Appetite changes, or weight gain or loss    Trouble falling or staying asleep, or sleeping too much    Fatigue (being mentally and physically tired) or lack of energy    Feeling restless, irritable, or withdrawn    Feeling worthless, hopeless, discouraged, or guilty    Trouble concentrating, remembering things, doing daily tasks, or making decisions    Thoughts about hurting or killing yourself  How is depression diagnosed? Your healthcare provider will ask about your symptoms and how long you have had them. Your provider will ask if you have any family members with depression. Tell your provider about any stressful events in your life. Your provider may ask about any other health conditions or medicines you take.    How is depression treated? Treatment depends on how severe your symptoms are. You may need any of the following:    Cognitive behavioral therapy (CBT) teaches you how to identify and change negative thought patterns.    Antidepressant medicine may be given to decrease or manage symptoms. You may need to take this medicine for several weeks before they start working. Tell your provider about any side effects or problems you have with your medicine. The type or amount of medicine may need to be changed.  What can I do to manage depression?    Talk to someone about your depression. Your healthcare provider may suggest counseling. You might feel more comfortable talking with a friend or family member about your depression. Choose someone you know will be supportive and encouraging.    Get regular physical activity. Physical activity can lower your stress, improve your mood, and help you sleep better. Work with your healthcare provider to develop a plan that you enjoy.   FAMILY WALKING FOR EXERCISE      Create a regular sleep schedule. A routine can help you relax before bed. Listen to music, read, or do yoga. Try to go to bed and wake up at the same time every day. Sleep is important for emotional health.    Eat a variety of healthy foods. Healthy foods include fruits, vegetables, whole-grain breads, low-fat dairy products, lean meats, fish, and cooked beans. A healthy meal plan is low in fat, salt, and added sugar.  Healthy Foods      Do not use alcohol, drugs, or nicotine products. These can worsen depression or make it hard to manage. Talk to your therapist or healthcare provider if you use any of these products and need help to quit.  The following resources are available at any time to help you, if needed:    Contact a suicide prevention organization:  For the [facility] Suicide and Crisis Lifeline:  Call or text [facility]    Send a chat on https://[employer].org/chat    Call 1-252.569.3494 (1-800-273-TALK)    For the Suicide Hotline, call 1-721.796.9959 (8-139-ZQPZCDR)    For a list of international numbers: https://save.org/find-help/international-resources/  Where can I find more information or support?    National [place] on Mental Illness  [address]  [place], [place]  Phone: 1-259.693.2414  Phone: 1-573.106.6956  Web Address: http://www.shefali.[employer]  [facility] Suicide and Crisis Lifeline  [address]  [place], [place]  Phone: 7-951-037  Web Address: http://www.suicidepreventionlifeline.org OR https://PERORA/chat/  Call your local emergency number (911 in the [place]) if:    You think about hurting yourself or someone else.    You have done something on purpose to hurt yourself.  When should I call my therapist or doctor?    Your symptoms get worse or do not get better with treatment.    Your depression keeps you from doing your regular daily activities.    You have new symptoms since your last visit.    You have questions or concerns about your condition or care.  CARE AGREEMENT:    You have the right to help plan your care. Learn about your health condition and how it may be treated. Discuss treatment options with your healthcare providers to decide what care you want to receive. You always have the right to refuse treatment.

## 2023-12-28 NOTE — ED BEHAVIORAL HEALTH NOTE - BEHAVIORAL HEALTH NOTE
Consult requested by Dr. Camacho at 19:42. Telepsychiatry attempted consult at 20:50 but unable to initiate due to inability to reach staff. ED staff reached at 21:22 and unable to initiate telepsychiatry device due to no private room available. ED RN educated to notify Telepsychiatry once patient able to be seen in a private room.

## 2023-12-28 NOTE — ED PROVIDER NOTE - PATIENT PORTAL LINK FT
You can access the FollowMyHealth Patient Portal offered by [facility] by registering at the following website: [email or web address]. By joining [facility]’s FollowMyHealth portal, you will also be able to view your health information using other applications (apps) compatible with our system. You can access the FollowMyHealth Patient Portal offered by [facility] by registering at the following website: [email or web address]. By joining [facility]’s FollowMyHealth portal, you will also be able to view your health information using other applications (apps) compatible with our system.

## 2023-12-28 NOTE — ED BEHAVIORAL HEALTH NOTE - BEHAVIORAL HEALTH NOTE
===================    PRE-HOSPITAL COURSE    ==================    SOURCE:  ED provider and ED documentation     DETAILS:  Pt bibEMS for SI from [facility].      ============    ED COURSE    ===========    SOURCE: ED provider and secondhand ED documentation.    ARRIVAL: Per ED documentation patient bibEMS to ED. Patient cooperative with triage process.     BELONGINGS: Unknown   BEHAVIOR: ED provider described patient to be calm, remains in behavioral and impulse control, and is not in restraints. Pt currently has 1:1 sitter.  Pt is not displaying aggression towards staff or others and was described as cooperative. Per provider, pt presenting with normal affect and mood is congruent with affect. Pt has a linear thought process and able to answer questions appropriately. Provider stated that the patient is endorsing current SI. No mention of HI. Per provider pt endorsing CAHKS. No VH.  There are visible marks on the body --  L hand and R forearm. Provider reports that the patient appears to have fair hygiene.   TREATMENT: No medication administered. No restraints.     VISITORS: None     -----------------------------------------------   COVID Exposure Screen- collateral (i.e. third-party, chart review, belongings, etc; include EMS and ED staff)   ---------------------------------------------------   1. Has the patient had a COVID-19 test in the last 90 days? Unknown.   2. Has the patient tested positive for COVID-19 antibodies? Unknown.   3.Has the patient received 2 doses of the COVID-19 vaccine?  Unknown.   4. In the past 10 days, has the patient been around anyone with a positive COVID-19 test?* Unknown.   5.Has the patient been out of [place] within the past 10 days? Unknown.

## 2023-12-28 NOTE — ED PROVIDER NOTE - PHYSICAL EXAMINATION
Afebrile, hemodynamically stable, saturating well on room air  NAD, well appearing, laying comfortably in bed, no WOB, speaking full sentences  Head NCAT  EOMI grossly, anicteric  MMM  RRR, nml S1/S2, no m/r/g  Lungs CTAB, no w/r/r  Abd soft, NT, ND, nml BS, no rebound or guarding, noted right groin incision, C/D/I, no swelling/induration/discoloration, nontender  AAO, CN's 3-12 grossly intact  SALAS spontaneously, no leg cyanosis or edema  Skin warm, well perfused, noted healing abrasions to left hand and right forearm, no open laceration or surrounding discoloration Afebrile, hemodynamically stable, saturating well on room air  NAD, well appearing, laying comfortably in bed, no WOB, speaking full sentences  Head NCAT  EOMI grossly, anicteric  MMM  RRR, nml S1/S2, no m/r/g  Lungs CTAB, no w/r/r  Abd soft, NT, ND, nml BS, no rebound or guarding, noted right groin incision, C/D/I, no swelling/induration/discoloration, nontender (PCA Nighat as chaperone)  AAO, CN's 3-12 grossly intact  SALAS spontaneously, no leg cyanosis or edema  Skin warm, well perfused, noted healing abrasions to left hand and right forearm, no open laceration or surrounding discoloration

## 2023-12-28 NOTE — ED BEHAVIORAL HEALTH NOTE - BEHAVIORAL HEALTH NOTE
“Patient gives permission to obtain collateral from _[facility] group [facility]____:    (  ) Yes    (x  )  No    Rationale for overriding objection              (  ) Lack of capacity. Details: ________              ( x ) Assessing risk of danger to self/others. Details: ________          Rationale for selecting specific collateral source              (  ) Potential to impact risk of danger to self/others and no alternative equivalent. Details: _____”        [facility] ([phone]). There was no answer and [facility] was unable to leave a message at this time. “Patient gives permission to obtain collateral from _[facility] group [facility]____:    (  ) Yes    (x  )  No    Rationale for overriding objection              (  ) Lack of capacity. Details: ________              ( x ) Assessing risk of danger to self/others. Details: ________          Rationale for selecting specific collateral source              (  ) Potential to impact risk of danger to self/others and no alternative equivalent. Details: _____”        [facility] ([phone]). There was no answer and [facility] a message at this time.

## 2023-12-28 NOTE — ED ADULT TRIAGE NOTE - CHIEF COMPLAINT QUOTE
c/o pain to testicles   s/p Lt testicle removed 10/30   c/o hurt himself this morning , reopened a cut to Lt hand  wants to be transferred to psych

## 2023-12-28 NOTE — ED ADULT NURSE NOTE - NSFALLUNIVINTERV_ED_ALL_ED
Bed/Stretcher in lowest position, wheels locked, appropriate side rails in place/Call bell, personal items and telephone in reach/Instruct patient to call for assistance before getting out of bed/chair/stretcher/Non-slip footwear applied when patient is off stretcher/[place] to call system/Physically safe environment - no spills, clutter or unnecessary equipment/Purposeful proactive rounding/Room/bathroom lighting operational, light cord in reach Bed/Stretcher in lowest position, wheels locked, appropriate side rails in place/Call bell, personal items and telephone in reach/Instruct patient to call for assistance before getting out of bed/chair/stretcher/Non-slip footwear applied when patient is off stretcher/[place] to call system/Physically safe environment - no spills, clutter or unnecessary equipment/Purposeful proactive rounding/Room/bathroom lighting operational, light cord in reach

## 2023-12-28 NOTE — ED PROVIDER NOTE - OBJECTIVE STATEMENT
33-year-old male with history of intellectual disability, bipolar disorder, with frequent ED presentations, presents from group home with "SI." When asked what this means to him, he states he is hearing voices that tell him to do something to himself, and he stabbed his left hand and right forearm with a plastic object last week and today. He states he is also here for right groin pain that has been present ever since testicular removal surgery 2 months ago at [facility]. Has not taken analgesia today. Denies vomiting, diarrhea, constipation, black or bloody stool, urinary changes, fever, chills, and all other symptoms. 33-year-old male with history of intellectual disability, bipolar disorder, with frequent ED presentations, presents from group home with "SI." When asked what this means to him, he states he is hearing voices that tell him to do something to himself, and he stabbed his left hand and right forearm with a plastic object last week and today. He states he is also here for right groin pain that has been present ever since testicular removal surgery 2 months ago at [facility]. Has not taken analgesia today. Denies vomiting, diarrhea, constipation, black or bloody stool, urinary changes, fever, chills, and all other symptoms.

## 2023-12-28 NOTE — ED PROVIDER NOTE - CLINICAL SUMMARY MEDICAL DECISION MAKING FREE TEXT BOX
Abdomen entirely benign, with low suspicion for acute intra-abdominal process to warrant imaging. No evidence of infection to area. Abrasions to extremities also with no evidence of open laceration or infection to warrant repair or antibiotics at this time. Patient with report of SI and uses a lots of standard verbiage to describe why he is here. Appropriate mood and affect and patient is forward-looking and denies specific plan and low suspicion for acute suicidality at this time. Psychiatry consulted and Abdomen entirely benign, with low suspicion for acute intra-abdominal process to warrant imaging. No evidence of infection to area. Abrasions to extremities also with no evidence of open laceration or infection to warrant repair or antibiotics at this time. Patient with report of SI and uses a lots of standard verbiage to describe why he is here. Appropriate mood and affect and patient is forward-looking and denies specific plan and low suspicion for acute suicidality at this time. Psychiatry consulted and low suspicion for acute suicidality as noted above progress note. Patient is well appearing, NAD, afebrile, hemodynamically stable. Any available tests and studies were discussed with patient. Discharged with instructions in further symptomatic care, return precautions, and need for PMD and surg f/u.

## 2023-12-28 NOTE — ED PROVIDER NOTE - PROGRESS NOTE DETAILS
D/w Jerome of telepsych, will assess patient. D/w Dr. Garcia telepsychiatry and states this appears to be patient's baseline behavior and low suspicion for acute suicidality. Patient is well appearing, NAD, afebrile, hemodynamically stable. Any available tests and studies were discussed with patient. Discharged with instructions in further symptomatic care, return precautions, and need for PMD and surg f/u.

## 2023-12-29 ENCOUNTER — EMERGENCY (EMERGENCY)
Facility: HOSPITAL | Age: 33
LOS: 1 days | Discharge: HOME | End: 2023-12-29
Attending: EMERGENCY MEDICINE | Admitting: EMERGENCY MEDICINE
Payer: MEDICAID

## 2023-12-29 VITALS
SYSTOLIC BLOOD PRESSURE: 123 MMHG | TEMPERATURE: 98 F | DIASTOLIC BLOOD PRESSURE: 84 MMHG | HEART RATE: 89 BPM | OXYGEN SATURATION: 100 % | RESPIRATION RATE: 16 BRPM

## 2023-12-29 VITALS
DIASTOLIC BLOOD PRESSURE: 81 MMHG | OXYGEN SATURATION: 96 % | TEMPERATURE: 98 F | HEIGHT: 71 IN | SYSTOLIC BLOOD PRESSURE: 112 MMHG | RESPIRATION RATE: 15 BRPM | HEART RATE: 91 BPM

## 2023-12-29 DIAGNOSIS — Z90.79 ACQUIRED ABSENCE OF OTHER GENITAL ORGAN(S): Chronic | ICD-10-CM

## 2023-12-29 PROCEDURE — 99284 EMERGENCY DEPT VISIT MOD MDM: CPT

## 2023-12-29 PROCEDURE — 90792 PSYCH DIAG EVAL W/MED SRVCS: CPT | Mod: 95

## 2023-12-29 RX ORDER — DIVALPROEX SODIUM 500 MG/1
500 TABLET, DELAYED RELEASE ORAL ONCE
Refills: 0 | Status: COMPLETED | OUTPATIENT
Start: 2023-12-29 | End: 2023-12-29

## 2023-12-29 RX ORDER — HYDROXYZINE HCL 10 MG
25 TABLET ORAL ONCE
Refills: 0 | Status: COMPLETED | OUTPATIENT
Start: 2023-12-29 | End: 2023-12-29

## 2023-12-29 RX ORDER — RISPERIDONE 4 MG/1
3 TABLET ORAL ONCE
Refills: 0 | Status: COMPLETED | OUTPATIENT
Start: 2023-12-29 | End: 2023-12-29

## 2023-12-29 RX ORDER — GABAPENTIN 400 MG/1
400 CAPSULE ORAL ONCE
Refills: 0 | Status: COMPLETED | OUTPATIENT
Start: 2023-12-29 | End: 2023-12-29

## 2023-12-29 RX ADMIN — Medication 25 MILLIGRAM(S): at 05:52

## 2023-12-29 RX ADMIN — GABAPENTIN 400 MILLIGRAM(S): 400 CAPSULE ORAL at 05:54

## 2023-12-29 RX ADMIN — DIVALPROEX SODIUM 500 MILLIGRAM(S): 500 TABLET, DELAYED RELEASE ORAL at 06:41

## 2023-12-29 RX ADMIN — RISPERIDONE 3 MILLIGRAM(S): 4 TABLET ORAL at 06:41

## 2023-12-29 NOTE — ED BEHAVIORAL HEALTH ASSESSMENT NOTE - NSBHATTESTBILLING_PSY_A_CORE
[id number] diagnostic evaluation with medical services [id number] diagnostic evaluation with medical services

## 2023-12-29 NOTE — ED BEHAVIORAL HEALTH ASSESSMENT NOTE - DETAILS
see hpi Per chart has had a rash in response to Zyprexa Per chart has prior trauma (details unknown) agreed discussed, responses similar to last documented one in September 2023

## 2023-12-29 NOTE — ED ADULT TRIAGE NOTE - CHIEF COMPLAINT QUOTE
pt c/o suicidal ideation x1 month. Today pt cut right forearm, superficial lac observed, no active bleeding. Endorsing auditory hallucinations. Pt calm and cooperative on arrival. Hx. Anxiety. ADHD. MR. Borderline personality disorder. Impulse control disorder. Testicular CA in remission. DM2. Pt denies drug use or alochol use. MD Luther called for evaluation.

## 2023-12-29 NOTE — ED BEHAVIORAL HEALTH NOTE - BEHAVIORAL HEALTH NOTE
Writer outreached pt's mother, Liz Bazan, for collateral. Pt's mother provided the following information:     Pt with mental health hx. Pt domiciled at [facility] ([phone]). Pt seen earlier in day at [facility] and discharged. Mother reports pt to have hurt himself. Pt with hx of self harming behavior when stressed. Pt had been on home visit with mother from 12/24 to 12/27. Mother reports pt does not do well with change/adjustment and started to express feeling stressed towards the end of his visit. Pt was anxious and wanted to go back. Mother reports she was then notified that pt was aggressive and cut himself with soda can. Mother does confirm pt having a hx of hurting self. Mother says when voices tell him to hurt someone else pt will become overwhelmed leading to self harm. Writer outreached pt's mother, Liz Bazan, for collateral. Pt's mother provided the following information:     Pt with mental health hx. Pt domiciled at [facility] ([phone]). Pt seen earlier in day at [facility] and discharged. Mother reports pt to have hurt himself. Pt with hx of self harming behavior when stressed. Pt had been on home visit with mother from 12/24 to 12/27. Mother reports pt does not do well with change/adjustment and started to express feeling stressed towards the end of his visit. Pt was anxious and wanted to go back. Mother reports she was then notified that pt was aggressive and cut himself with soda can. Mother does confirm pt having a hx of hurting self. Mother says when voices tell him to hurt someone else pt will become overwhelmed leading to self harm. Writer outreached pt's mother, Liz Bazan, for collateral. Pt's mother provided the following information:     Pt with mental health hx. Pt domiciled at [facility] ([phone]). Pt seen earlier in day at [facility] and discharged. Mother reports pt to have hurt himself. Pt with hx of self harming behavior when stressed. Pt had been on home visit with mother from 12/24 to 12/27. Mother reports pt does not do well with change/adjustment and started to express feeling stressed towards the end of his visit. Pt was anxious and wanted to go back. Mother reports she was then notified that pt was aggressive and cut himself with soda can. Mother does confirm pt having a hx of hurting self. Mother says when voices tell him to hurt someone else pt will become overwhelmed leading to self harm.    Writer contacted [facility] #[phone]. Writer spoke with [facility] Supervisor, Hillary, who reported the following:     Pt with hx of Schizophrenia. No recent inpatient admissions. Pt was said to have asked to go back to the hospital. Pt then said to have put finger in soda can cutting himself. Other sharp objects said to be kept away from pt. Pt originally went to [facility] for pain in stomach. Supervisor feels pt wanted a pain killer but wasn't given anything while at hospital. Pt then wanted to go back. Pt was said to have been mad that they did not give him a pain killer. Pt has reported hearing voices to hurt himself, both male and female voices for a year. No SI/HI intent or plan reported. No access to firearms.     Pt otherwise said to have been fine upon returning home from mothers. No acute safety concerns reported. Supervisor reports this to be a pattern. Pt likes to talk about himself and make everything about him? Pt said to be intermittently compliant with medications. Pt said to have hx of elopement.     [facility] address is [address], [address] Writer outreached pt's mother, Liz Bazan, for collateral. Pt's mother provided the following information:     Pt with mental health hx. Pt domiciled at [facility] ([phone]). Pt seen earlier in day at [facility] and discharged. Mother reports pt to have hurt himself. Pt with hx of self harming behavior when stressed. Pt had been on home visit with mother from 12/24 to 12/27. Mother reports pt does not do well with change/adjustment and started to express feeling stressed towards the end of his visit. Pt was anxious and wanted to go back. Mother reports she was then notified that pt was aggressive and cut himself with soda can. Mother does confirm pt having a hx of hurting self. Mother says when voices tell him to hurt someone else pt will become overwhelmed leading to self harm.    Writer contacted [facility] #[phone]. Writer spoke with [facility] Supervisor, Hillary, who reported the following:     Pt with hx of Schizophrenia. No recent inpatient admissions. Pt was said to have asked to go back to the hospital. Pt then said to have put finger in soda can cutting himself. Other sharp objects said to be kept away from pt. Pt originally went to [facility] for pain in stomach. Supervisor feels pt wanted a pain killer but wasn't given anything while at hospital. Pt then wanted to go back. Pt was said to have been mad that they did not give him a pain killer. Pt has reported hearing voices to hurt himself, both male and female voices for a year. No SI/HI intent or plan reported. No access to firearms.     Pt otherwise said to have been fine upon returning home from mothers. No acute safety concerns reported. Supervisor reports this to be a pattern. Pt likes to talk about himself and make everything about him? Pt said to be intermittently compliant with medications. Pt said to have hx of elopement.     [facility] address is [address], [address]. Writer outreached pt's mother, Liz Bazan, for collateral. Pt's mother provided the following information:     Pt with mental health hx. Pt domiciled at [facility] ([phone]). Pt seen earlier in day at [facility] and discharged. Mother reports pt to have hurt himself. Pt with hx of self harming behavior when stressed. Pt had been on home visit with mother from 12/24 to 12/27. Mother reports pt does not do well with change/adjustment and started to express feeling stressed towards the end of his visit. Pt was anxious and wanted to go back. Mother reports she was then notified that pt was aggressive and cut himself with soda can. Mother does confirm pt having a hx of hurting self. Mother says when voices tell him to hurt someone else pt will become overwhelmed leading to self harm.    Writer contacted [facility] #[phone]. Writer spoke with [facility] Supervisor, Hillary, who reported the following:     Pt with hx of Schizophrenia. No recent inpatient admissions. Pt was said to have asked to go back to the hospital. Pt then said to have put finger in soda can cutting himself. Other sharp objects said to be kept away from pt. Pt originally went to [facility] for pain in stomach. Supervisor feels pt wanted a pain killer but wasn't given anything while at hospital. Pt then wanted to go back. Pt was said to have been mad that they did not give him a pain killer. Pt has reported hearing voices to hurt himself, both male and female voices for a year. No SI/HI intent or plan reported. No access to firearms.     Pt otherwise said to have been fine upon returning home from mothers. No acute safety concerns reported. Supervisor reports this to be a pattern. Pt likes to talk about himself and make everything about him? Pt said to be intermittently compliant with medications. Pt said to have hx of elopement.     [facility] address is [address] [place] [address], [address].     Pt cleared for discharge.  aware of pt's return. VM left with mother. RN arranged EMS transport for pt. Verbal huddle occurred with interdisciplinary team. Writer outreached pt's mother, Liz Bazan, for collateral. Pt's mother provided the following information:     Pt with mental health hx. Pt domiciled at [facility] ([phone]). Pt seen earlier in day at [facility] and discharged. Mother reports pt to have hurt himself. Pt with hx of self harming behavior when stressed. Pt had been on home visit with mother from 12/24 to 12/27. Mother reports pt does not do well with change/adjustment and started to express feeling stressed towards the end of his visit. Pt was anxious and wanted to go back. Mother reports she was then notified that pt was aggressive and cut himself with soda can. Mother does confirm pt having a hx of hurting self. Mother says when voices tell him to hurt someone else pt will become overwhelmed leading to self harm.    Writer contacted [facility] #[phone]. Writer spoke with Psychiatric Supervisor, Hillary, who reported the following:     Pt with hx of Schizophrenia. No recent inpatient admissions. Pt was said to have asked to go back to the hospital. Pt then said to have put finger in soda can cutting himself. Other sharp objects said to be kept away from pt. Pt originally went to [facility] for pain in stomach. Supervisor feels pt wanted a pain killer but wasn't given anything while at hospital. Pt then wanted to go back. Pt was said to have been mad that they did not give him a pain killer. Pt has reported hearing voices to hurt himself, both male and female voices for a year. No SI/HI intent or plan reported. No access to firearms.     Pt otherwise said to have been fine upon returning home from mothers. No acute safety concerns reported. Supervisor reports this to be a pattern. Pt likes to talk about himself and make everything about him? Pt said to be intermittently compliant with medications. Pt said to have hx of elopement.     [facility] address is [address] [place] [address], [address].     Pt cleared for discharge.  aware of pt's return. VM left with mother. RN arranged EMS transport for pt. Verbal huddle occurred with interdisciplinary team. Writer outreached pt's mother, Liz Bazan, for collateral. Pt's mother provided the following information:     Pt with mental health hx. Pt domiciled at [facility] ([phone]). Pt seen earlier in day at [facility] and discharged. Mother reports pt to have hurt himself. Pt with hx of self harming behavior when stressed. Pt had been on home visit with mother from 12/24 to 12/27. Mother reports pt does not do well with change/adjustment and started to express feeling stressed towards the end of his visit. Pt was anxious and wanted to go back. Mother reports she was then notified that pt was aggressive and cut himself with soda can. Mother does confirm pt having a hx of hurting self. Mother says when voices tell him to hurt someone else pt will become overwhelmed leading to self harm.    Writer contacted [facility] #[phone]. Writer spoke with [facility] Supervisor, Hillary, who reported the following:     Pt with hx of Schizophrenia. No recent inpatient admissions. Pt was said to have asked to go back to the hospital. Pt then said to have put finger in soda can cutting himself. Other sharp objects said to be kept away from pt. Pt originally went to [facility] for pain in stomach. Supervisor feels pt wanted a pain killer but wasn't given anything while at hospital. Pt then wanted to go back. Pt was said to have been mad that they did not give him a pain killer. Pt has reported hearing voices to hurt himself, both male and female voices for a year. No SI/HI intent or plan reported. No access to firearms.     Pt otherwise said to have been fine upon returning home from mothers. No acute safety concerns reported. Supervisor reports this to be a pattern. Pt likes to talk about himself and make everything about him? Pt said to be intermittently compliant with medications. Pt said to have hx of elopement.     [facility] address is [address] [place] [address], [address].     Pt cleared for discharge.  aware of pt's return. VM left with mother. RN arranged EMS transport for pt. Verbal huddle occurred with interdisciplinary team. Prior booking scheduled on [facility] portal with [facility] with invoice #[id number]. Writer outreached pt's mother, Liz Bazan, for collateral. Pt's mother provided the following information:     Pt with mental health hx. Pt domiciled at [facility] ([phone]). Pt seen earlier in day at [facility] and discharged. Mother reports pt to have hurt himself. Pt with hx of self harming behavior when stressed. Pt had been on home visit with mother from 12/24 to 12/27. Mother reports pt does not do well with change/adjustment and started to express feeling stressed towards the end of his visit. Pt was anxious and wanted to go back. Mother reports she was then notified that pt was aggressive and cut himself with soda can. Mother does confirm pt having a hx of hurting self. Mother says when voices tell him to hurt someone else pt will become overwhelmed leading to self harm.    Writer contacted [facility] #[phone]. Writer spoke with [facility] Supervisor, Hillary, who reported the following:     Pt with hx of Schizophrenia. No recent inpatient admissions. Pt was said to have asked to go back to the hospital. Pt then said to have put finger in soda can cutting himself. Other sharp objects said to be kept away from pt. Pt originally went to [facility] for pain in stomach. Supervisor feels pt wanted a pain killer but wasn't given anything while at hospital. Pt then wanted to go back. Pt was said to have been mad that they did not give him a pain killer. Pt has reported hearing voices to hurt himself, both male and female voices for a year. No SI/HI intent or plan reported. No access to firearms.     Pt otherwise said to have been fine upon returning home from mothers. No acute safety concerns reported. Supervisor reports this to be a pattern. Pt likes to talk about himself and make everything about him? Pt said to be intermittently compliant with medications. Pt said to have hx of elopement.     [facility] address is [address] [place] [address], [address].     Pt cleared for discharge.  aware of pt's return. VM left with mother. RN arranged EMS transport for pt. Verbal huddle occurred with interdisciplinary team. Prior booking scheduled on [facility] portal with [facility] with invoice #[id number].

## 2023-12-29 NOTE — ED BEHAVIORAL HEALTH ASSESSMENT NOTE - DIFFERENTIAL
asthma, DM, urinary retention, GERD, BPH, hypothyroidism, HLD, HTN, and b/l myopia Intellectual disability, ASD

## 2023-12-29 NOTE — ED BEHAVIORAL HEALTH ASSESSMENT NOTE - DESCRIPTION
DM, HLD, BPH, hypothyroidism, GERD upon initial evaluation he is rocking on the chair  appears anxious. when reassessed 1 hour later found in the room sleeping , calm 9this was after his was given his home meds).  Vital Signs Last 24 Hrs  T(C): 36.6 (29 Dec 2023 03:11), Max: 36.7 (28 Dec 2023 14:19)  T(F): 97.9 (29 Dec 2023 03:11), Max: 98.1 (28 Dec 2023 14:19)  HR: 91 (29 Dec 2023 03:11) (79 - 92)  BP: 112/81 (29 Dec 2023 03:11) (109/74 - 129/69)  BP(mean): --  RR: 15 (29 Dec 2023 03:11) (15 - 18)  SpO2: 96% (29 Dec 2023 03:11) (95% - 96%)    Parameters below as of 29 Dec 2023 03:11  Patient On (Oxygen Delivery Method): room air as per HPI

## 2023-12-29 NOTE — ED BEHAVIORAL HEALTH ASSESSMENT NOTE - NSBHMSELANG_PSY_A_CORE
"Chief Complaint   Patient presents with     Derm Problem     /80 (Cuff Size: Adult Large)   Pulse 70   Temp 99.3  F (37.4  C) (Tympanic)   Resp 18   Ht 1.626 m (5' 4\")   Wt 108.9 kg (240 lb)   LMP 08/07/2020   Breastfeeding No   BMI 41.20 kg/m   Estimated body mass index is 41.2 kg/m  as calculated from the following:    Height as of this encounter: 1.626 m (5' 4\").    Weight as of this encounter: 108.9 kg (240 lb).  Patient presents to the clinic using No DME      Health Maintenance that is potentially due pending provider review:    Health Maintenance Due   Topic Date Due     DIABETIC FOOT EXAM  1989     HIV SCREENING  11/21/2004     PNEUMOCOCCAL IMMUNIZATION 19-64 MEDIUM RISK (1 of 1 - PPSV23) 11/21/2008     DTAP/TDAP/TD IMMUNIZATION (6 - Td) 08/19/2012     EYE EXAM  03/01/2020     A1C  06/13/2020     PREVENTIVE CARE VISIT  07/23/2020     LIPID  07/23/2020     MICROALBUMIN  07/23/2020     INFLUENZA VACCINE (1) 09/01/2020     PAP FOLLOW-UP  10/01/2020     HPV FOLLOW-UP  10/01/2020                "
No abnormalities noted

## 2023-12-29 NOTE — ED ADULT TRIAGE NOTE - [facility]

## 2023-12-29 NOTE — ED BEHAVIORAL HEALTH ASSESSMENT NOTE - HPI (INCLUDE ILLNESS QUALITY, SEVERITY, DURATION, TIMING, CONTEXT, MODIFYING FACTORS, ASSOCIATED SIGNS AND SYMPTOMS)
Called to reschedule her appointment that was scheduled today at 3:15pm  She can reschedule next Monday 3/23 if that is convenient  Markus is a 33 year-old male, single, disabled, non-caregiver, living at a [facility], with PMHx of asthma, DM, urinary retention, GERD, BPH, hypothyroidism, HLD, HTN, and b/l myopia, with PPHx of moderate ID, ASD, impulse control disorder, factitious disorder, HAVEN, mood disorders, PTSD and ADHD, hx of multiple past psych admissions (last at [facility] 7/13-21/2023), numerous ED visits for both medical/psych complaints, well-known to telepsychiatry for frequent similar presentations, longstanding h/o SIB (head banging, cutting), no known SA, longstanding h/o endorsing SI, h/o aggression toward staff at , on Depakote 500 mg qAM & 1000mg qPM, Guanfacine 2 mg, Risperdal 3 mg BID, and Remeron 30 mg, prescribed by his PCP, who presents to  [facility] ED on 12/28 for groin pain and while in ED reportedhe has si , subsequently seen by telepsychiatry , assessed and was subsequently discharged. Notably, patient has multiple similar presentations, including most recently on 11/21/23, 11/05/23, 09/27/23, 9/14/23, 8/20/23 and 7/24/23. He returned to this ED shortly after he was discharged from [place] ED , after taking plastic object and making superifical cuts to the forearm and then notifying the staff at the group home who activated 911 call.     as per 11/29  assessment at [facility] : "On exam, patient is notably regressed and concrete during evaluation. Patient described the voices as coming from inside his head, and could not elaborate on AH. Patient requested to be admitted, when asked why he said he wants to be admitted so he can talk to someone. Patient said he has "SI" when asked what he means by that he could not elaborate, and when asked about a plan he had none. He said his psychiatrist is currently on vacation. Denied any substance use. No other affective or psychotic symptoms reported.AYDIN Thornton,  supervisor, [phone] She reported patient was complaining about testicular pain, because they were removed recently because of malignancy, and that is why he came to the hospital. She denied any change in his behavior, and reports he is at his psychiatric baseline. She has no acute safety concerns. She stated he likes to go to the hospital every day. States he has never had a SA. He has a 1 to 1 at home when he is in crisis. There only concerns were medical concerns regarding his pain. He is for the most part compliant with his medications, but sometimes will take a trip to the hospital just to take his medications. He likes to come to the hospital to socialize or when he is looking for attention. States he can return to the ."    Patient on initial assessment , found rocking on the chair back and forth, upon approach he asks "Am I going to be admitted?, I am willing to wait for a bed". Patient states he cuts himself because the voices told him to do so and then states "I don't want to go back to the group home ". Patient says if he returns  he will cut himself again. When asked about his recent presentation to the [facility] he says , "they let me go back but I don't want to go back , I don't like it there" referring to the group home .  Pt initially says he is having cah to hurt himself. He denies any other sx including vh , feeling depressed or having hi/i/p. He stated he sleep and eats well.   Patient received his morning home medications and went to the room to sleep, about 1 hour after his initial assessment pt was seen again. At this point pt appeared calmer, no longer anxious, no longer reported cah , or any ah . he denies having si/hi/i/p. Patient is now saying he is ok to return to the group home . Markus is a 33 year-old male, single, disabled, non-caregiver, living at a [facility], with PMHx of asthma, DM, urinary retention, GERD, BPH, hypothyroidism, HLD, HTN, and b/l myopia, with PPHx of moderate ID, ASD, impulse control disorder, factitious disorder, HAVEN, mood disorders, PTSD and ADHD, hx of multiple past psych admissions (last at [facility] 7/13-21/2023), numerous ED visits for both medical/psych complaints, well-known to telepsychiatry for frequent similar presentations, longstanding h/o SIB (head banging, cutting), no known SA, longstanding h/o endorsing SI, h/o aggression toward staff at , on Depakote 500 mg qAM & 1000mg qPM, Guanfacine 2 mg, Risperdal 3 mg BID, and Remeron 30 mg, prescribed by his PCP, who presents to  [facility] ED on 12/28 for groin pain and while in ED reportedhe has si , subsequently seen by telepsychiatry , assessed and was subsequently discharged. Notably, patient has multiple similar presentations, including most recently on 11/21/23, 11/05/23, 09/27/23, 9/14/23, 8/20/23 and 7/24/23. He returned to this ED shortly after he was discharged from [place] [facility] , after taking plastic object and making superifical cuts to the forearm and then notifying the staff at the group home who activated 911 call.     as per 11/29  assessment at [facility] : "On exam, patient is notably regressed and concrete during evaluation. Patient described the voices as coming from inside his head, and could not elaborate on AH. Patient requested to be admitted, when asked why he said he wants to be admitted so he can talk to someone. Patient said he has "SI" when asked what he means by that he could not elaborate, and when asked about a plan he had none. He said his psychiatrist is currently on vacation. Denied any substance use. No other affective or psychotic symptoms reported.AYDIN Thornton,  supervisor, [phone] She reported patient was complaining about testicular pain, because they were removed recently because of malignancy, and that is why he came to the hospital. She denied any change in his behavior, and reports he is at his psychiatric baseline. She has no acute safety concerns. She stated he likes to go to the hospital every day. States he has never had a SA. He has a 1 to 1 at home when he is in crisis. There only concerns were medical concerns regarding his pain. He is for the most part compliant with his medications, but sometimes will take a trip to the hospital just to take his medications. He likes to come to the hospital to socialize or when he is looking for attention. States he can return to the ."    Patient on initial assessment , found rocking on the chair back and forth, upon approach he asks "Am I going to be admitted?, I am willing to wait for a bed". Patient states he cuts himself because the voices told him to do so and then states "I don't want to go back to the group home ". Patient says if he returns  he will cut himself again. When asked about his recent presentation to the [facility] he says , "they let me go back but I don't want to go back , I don't like it there" referring to the group home .  Pt initially says he is having cah to hurt himself. He denies any other sx including vh , feeling depressed or having hi/i/p. He stated he sleep and eats well.   Patient received his morning home medications and went to the room to sleep, about 1 hour after his initial assessment pt was seen again. At this point pt appeared calmer, no longer anxious, no longer reported cah , or any ah . he denies having si/hi/i/p. Patient is now saying he is ok to return to the group home .

## 2023-12-29 NOTE — ED BEHAVIORAL HEALTH ASSESSMENT NOTE - OTHER
concrete [facility]  Psychiatry, Alpha tab, , HIE Outpatient , CV Outpatient Psychiatry, [facility] E[facility]A Psychiatry, [facility] CL, [facility] In[facility], [facility] CL, [facility] Inpatient Psychiatry, [facility] Outpatient Medical, webcrims, jaylin, google search, [facility] Inpatient Psychiatry, Raquel, [facility] ED not internally preoccupied with peers - Group Home limited (chronic) [employer] supervisor Evan  Psychiatry, Alpha tab, , HIE Outpatient , CV Outpatient Psychiatry, [facility] Psychiatry, [facility] CL, [facility] Inpatient, [facility] CL, [facility] Inpatient Psychiatry, [facility] Outpatient Medical, webcrims, jaylin, google search, [facility] Inpatient Psychiatry, Raquel, [facility] ED staff lives in supervised setting; connected to care

## 2023-12-29 NOTE — ED BEHAVIORAL HEALTH ASSESSMENT NOTE - DETAILS
see hpi discussed, responses similar to last documented one in September 2023 Per chart has prior trauma (details unknown) agreed Per chart has had a rash in response to Zyprexa

## 2023-12-29 NOTE — BH SAFETY PLAN - SUICIDE PREVENTION LIFELINE PHONES
Suicide Prevention Lifeline Phone: [phone]- TALK (9022) Suicide Prevention Lifeline Phone: [phone]- TALK ([phone])

## 2023-12-29 NOTE — ED BEHAVIORAL HEALTH ASSESSMENT NOTE - NSBHATTESTBILLING_PSY_A_CORE
[id number] diagnostic evaluation with medical services [id number] diagnostic evaluation with medical services

## 2023-12-29 NOTE — ED BEHAVIORAL HEALTH ASSESSMENT NOTE - HPI (INCLUDE ILLNESS QUALITY, SEVERITY, DURATION, TIMING, CONTEXT, MODIFYING FACTORS, ASSOCIATED SIGNS AND SYMPTOMS)
Markus is a 33 year-old male, single, disabled, non-caregiver, living at a [facility], with PMHx of asthma, DM, urinary retention, GERD, BPH, hypothyroidism, HLD, HTN, and b/l myopia, with PPHx of moderate ID, ASD, impulse control disorder, factitious disorder, HAVEN, mood disorders, PTSD and ADHD, hx of multiple past psych admissions (last at [facility] 7/13-21/2023), numerous ED visits for both medical/psych complaints, well-known to telepsychiatry for frequent similar presentations, longstanding h/o SIB (head banging, cutting), no known SA, longstanding h/o endorsing SI, h/o aggression toward staff at , on Depakote 500 mg qAM & 1000mg qPM, Guanfacine 2 mg, Risperdal 3 mg BID, and Remeron 30 mg, prescribed by his PCP, who presents to the ED BIB EMS for wirst pain, and then upon medical examination, disclosing thoughts of harming self. Notably, patient has multiple similar presentations, including most recently on 11/21/23, 11/05/23, 09/27/23, 9/14/23, 8/20/23 and 7/24/23.     On exam, patient is notably regressed and concrete during evaluation. Patient described the voices as coming from inside his head, and could not elaborate on AH. Patient requested to be admitted, when asked why he said he wants to be admitted so he can talk to someone. Patient said he has "SI" when asked what he means by that he could not elaborate, and when asked about a plan he had none. He said his psychiatrist is currently on vacation. Denied any substance use. No other affective or psychotic symptoms reported.    AYDIN Thornton,  supervisor, [phone]   She reported patient was complaining about testicular pain, because they were removed recently because of malignancy, and that is why he came to the hospital. She denied any change in his behavior, and reports he is at his psychiatric baseline. She has no acute safety concerns. She stated he likes to go to the hospital every day. States he has never had a SA. He has a 1 to 1 at home when he is in crisis. There only concerns were medical concerns regarding his pain. He is for the most part compliant with his medications, but sometimes will take a trip to the hospital just to take his medications. He likes to come to the hospital to socialize or when he is looking for attention. States he can return to the . Markus is a 33 year-old male, single, disabled, non-caregiver, living at a [facility], with PMHx of asthma, DM, urinary retention, GERD, BPH, hypothyroidism, HLD, HTN, and b/l myopia, with PPHx of moderate ID, ASD, impulse control disorder, factitious disorder, HAVEN, mood disorders, PTSD and ADHD, hx of multiple past psych admissions (last at [facility] 7/13-21/2023), numerous ED visits for both medical/psych complaints, well-known to telepsychiatry for frequent similar presentations, longstanding h/o SIB (head banging, cutting), no known SA, longstanding h/o endorsing SI, h/o aggression toward staff at , on Depakote 500 mg qAM & 1000mg qPM, Guanfacine 2 mg, Risperdal 3 mg BID, and Remeron 30 mg, prescribed by his PCP, who presents to the ED BIB EMS for wirst pain, and then upon medical examination, disclosing thoughts of harming self. Notably, patient has multiple similar presentations, including most recently on 11/21/23, 11/05/23, 09/27/23, 9/14/23, 8/20/23 and 7/24/23.     On exam, patient is notably regressed and concrete during evaluation. Patient described the voices as coming from inside his head, and could not elaborate on AH. Patient requested to be admitted, when asked why he said he wants to be admitted so he can talk to someone. Patient said he has "SI" when asked what he means by that he could not elaborate, and when asked about a plan he had none. He said his psychiatrist is currently on vacation. Denied any substance use. No other affective or psychotic symptoms reported.    AYDIN Thornton,  supervisor, [phone]   She reported patient was complaining about testicular pain, because they were removed recently because of malignancy, and that is why he came to the hospital. She denied any change in his behavior, and reports he is at his psychiatric baseline. She has no acute safety concerns. She stated he likes to go to the hospital every day. States he has never had a SA. He has a 1 to 1 at home when he is in crisis. There only concerns were medical concerns regarding his pain. He is for the most part compliant with his medications, but sometimes will take a trip to the hospital just to take his medications. He likes to come to the hospital to socialize or when he is looking for attention. States he can return to the .

## 2023-12-29 NOTE — ED ADULT NURSE REASSESSMENT NOTE - NS ED NURSE REASSESS COMMENT FT1
Received pt in  pt calm requesting to be admitted pt denies si/hi presently, emotional support provided eval on going. Received pt in  pt calm requesting to be admitted pt denies si/hi presently, emotional support pt cleared & d/c by provider transported home via EMS.

## 2023-12-29 NOTE — ED PROVIDER NOTE - OBJECTIVE STATEMENT
33-year-old male with history of borderline personality disorder, autism, bipolar disorder, diabetes, asthma, presenting to ER from group home for auditory hallucinations, command in nature telling him to hurt himself, also cut right forearm with plastic object to hurt himself.  Of note, patient was seen at [place] emergency room tonight for suicidal thoughts as well as 2 months of right groin pain after bilateral orchiectomy for testicular cancer, patient had labs, seen by psychiatry and cleared for discharge home.  After patient got home, he cut himself.  States he does not want to kill himself right now but he wants to "hurt himself" and plans to "hurt anyone that gets in my way".  No dysuria, fevers, genital redness or skin changes 33-year-old male with history of borderline personality disorder, autism, bipolar disorder, diabetes, asthma, presenting to ER from group home for auditory hallucinations, command in nature telling him to hurt himself, also cut right forearm with plastic object to hurt himself.  Of note, patient was seen at [place] emergency room tonight for suicidal thoughts as well as 2 months of right groin pain after bilateral orchiectomy for testicular cancer, patient had labs, seen by psychiatry and cleared for discharge home.  After patient got home, he cut himself.  States he does not want to kill himself right now but he wants to "hurt himself" and plans to "hurt anyone that gets in my way".  No dysuria, fevers, genital redness or skin changes Abdomen soft, non-tender, no guarding.

## 2023-12-29 NOTE — ED PROVIDER NOTE - PROGRESS NOTE DETAILS
Dr. Eugenio Luther DO (ED ATTENDING):  safety planning conducted by psych team, patient given home meds po  psych team deems pt stable for discharge home   SW contacted to assist in transportation back to [facility] Dr. Eugenio Luther DO (ED ATTENDING):  safety planning conducted by psych team, patient given home meds po  psych team deems pt stable for discharge home   SW contacted to assist in transportation back to [facility]

## 2023-12-29 NOTE — ED BEHAVIORAL HEALTH ASSESSMENT NOTE - OTHER
[facility]  Psychiatry, Alpha tab, , HIE Outpatient , CV Outpatient Psychiatry, [facility] Psychiatry, [facility] CL, [facility] Inpatient, [facility] CL, [facility] Inpatient Psychiatry, [facility] Outpatient Medical, webcrims, jaylin, google search, [facility] Inpatient Psychiatry, Raquel, [facility] ED lives in supervised setting; connected to care concrete with peers - Group Home limited (chronic) not internally preoccupied [facility]  Psychiatry, Alpha tab, , HIE Outpatient , CV Outpatient Psychiatry, [facility] Psychiatry, [facility] CL, [facility] Inpatient, [facility] CL, [facility] Inpatient Psychiatry, [facility] Outpatient Medical, webcrims, jaylin, google search, [facility] Inpatient Psychiatry, Raquel, [facility] ED [employer] supervisor staff

## 2023-12-29 NOTE — ED BEHAVIORAL HEALTH ASSESSMENT NOTE - SAFETY PLAN ADDT'L DETAILS
Safety plan discussed with.../Education provided regarding environmental safety / lethal means restriction/Provision of National Suicide Prevention Lifeline 1-591-880-UEPV (6121) Safety plan discussed with.../Education provided regarding environmental safety / lethal means restriction/Provision of National Suicide Prevention Lifeline 7-853-642-KFQM (7879)

## 2023-12-29 NOTE — ED PROVIDER NOTE - PHYSICAL EXAMINATION
General: Patient alert in no apparent distress  Skin: +left flexural surface of forearm with ~3cm superficial skin avulsion/laceration with no active bleeding  HEENT: Head atraumatic. Oral mucosa moist.   Eyes: Conjunctiva normal  Cardiac: Regular rhythm and rate. No pretibial edema b/l  Respiratory: Lungs clear b/l and symmetric. No respiratory distress. Able to speak in complete sentences.  Gastrointestinal: Abdomen soft, nondistended, nontender  : minimally tender at right groin/inguinal postop incision site. Scrotal sac empty without redness or tenderness or swelling noted. no inguinal hernias.  Musculoskeletal: Moves all extremities spontaneously  Neurological: alert and oriented to person, place, and time  Psychiatric: Calm and cooperative

## 2023-12-29 NOTE — ED BEHAVIORAL HEALTH ASSESSMENT NOTE - NSSUICPROTFACTOTHER_PSY_ALL_CORE
lives in supervised setting with linkage to services provided through [facility] lives in supervised setting with linkage to services provided through [facility]

## 2023-12-29 NOTE — ED ADULT NURSE NOTE - OBJECTIVE STATEMENT
Pt received to  accompanied by EMS. Pt presents calm and cooperative; and states hx of ADHD, MR, Bipolar, Autism, hypothyroid and testicular CA. Pt states he is experiencing AH with voices telling him to hurt himself. Pt endorses being seen for same yesterday evening at [facility] and was cleared for dc by psychiatry which angered him because he "wants to get admitted" and that upon returning back to his group home residence he cut his R wrist with a plastic knife. Per attending MD, cut is superficial and was dressed by triage RN upon arrival. Per EMS, pt stated he was no longer experiencing SI en route to ER but stated to writer "I am again and I want to be admitted". Pts belongings secured for safety. Pt awaiting psychiatric evaluation. Pt received to  accompanied by EMS. Pt presents calm and cooperative; and states hx of ADHD, MR, Bipolar, Autism, hypothyroid and testicular CA. Pt states he is experiencing AH with voices telling him to hurt himself. Pt endorses being seen for same yesterday evening at [facility] and was cleared for dc by psychiatry which angered him because he "wants to get admitted" and that upon returning back to his group home residence he cut his R wrist with a plastic knife. Per attending MD, cut is superficial and was dressed by triage RN upon arrival. Per EMS, pt stated he was no longer experiencing SI en route to ER but stated to writer "I am again and I want to be admitted". Pts belongings secured for safety. Pt awaiting psychiatric evaluation.

## 2023-12-29 NOTE — ED ADULT TRIAGE NOTE - NS ED NURSE AMBULANCES
[facility]

## 2023-12-29 NOTE — ED PROVIDER NOTE - NSFOLLOWUPINSTRUCTIONS_ED_ALL_ED_FT
You were seen in the Emergency Room for suicidal thoughts and also abdominal pain for months after your surgery    Your surgery site did not show signs of infection.  Please follow up with your surgeon within 1-2 weeks for your pain if it persists    The psychiatry team was able to develop a safety plan with you.     After our evaluation, we have determined you can be discharged home.    Please return to the Emergency Room if your symptoms change or worsen.    Follow up with your outpatient mental health team routinely

## 2023-12-29 NOTE — ED BEHAVIORAL HEALTH ASSESSMENT NOTE - NSBHMSEPERCEPT_PSY_A_CORE
No abnormalities initially reported he was having cah , after reassessment denied any ah/No abnormalities

## 2023-12-29 NOTE — ED BEHAVIORAL HEALTH ASSESSMENT NOTE - NSBHMSEBEHAV_PSY_A_CORE
----- Message from Oanh Alexandra MA sent at 10/23/2019 10:08 AM CDT -----  Contact: Sami (pt's son) [phone]  Good morning,     This was sent to us, you may have it also they have a note preference [facility]  ----- Message -----  From: Stacey Leyva MA  Sent: 10/23/2019   9:20 AM CDT  To: Yessy Allen Staff    Type:  Patient Requesting Referral    Who Called:  Pt's sonSami    Does the patient already have the specialty appointment scheduled?: No    If yes, what is the date of that appointment?: n/a    Referral to What Specialty: Opthalmology     Reason for Referral:  H05.232 (ICD-10-CM) - Hemorrhage of left orbit/emergency room follow up    Does the patient want the referral with a specific physician?: No    [facility] the [facility]          Would the patient rather a call back or a [facility]?  Call back     Best Call Back Number:  Sami (pt's son) [phone]           
Cooperative

## 2023-12-29 NOTE — ED PROVIDER NOTE - CLINICAL SUMMARY MEDICAL DECISION MAKING FREE TEXT BOX
33-year-old male with history of borderline personality disorder, autism, bipolar disorder, diabetes, asthma, presenting to ER from group home for auditory hallucinations, command in nature telling him to hurt himself, also cut right forearm with plastic object to hurt himself.  Of note, patient was seen at [place] emergency room tonight for suicidal thoughts as well as 2 months of right groin pain after bilateral orchiectomy for testicular cancer, patient had labs, seen by psychiatry and cleared for discharge home.  After patient got home, he cut himself.  States he does not want to kill himself right now but he wants to "hurt himself" and plans to "hurt anyone that gets in my way".  No dysuria, fevers, genital redness or skin changes    Exam  Abdomen soft, nondistended, minimally tender at right groin/inguinal postop incision site  left flexural surface of forearm with ~3cm superficial skin avulsion/laceration with no active bleeding  Incision intact with no drainage or redness or fluctuance palpated  Scrotal sac empty without redness or tenderness or swelling noted    Assessment/plan  Fleeting SI  Chronic groin pain, clinically not infected  Will call psychiatry for further disposition planning 33-year-old male with history of borderline personality disorder, autism, bipolar disorder, diabetes, asthma, presenting to ER from group home for auditory hallucinations, command in nature telling him to hurt himself, also cut right forearm with plastic object to hurt himself.  Of note, patient was seen at [place] emergency room tonight for suicidal thoughts as well as 2 months of right groin pain after bilateral orchiectomy for testicular cancer, patient had labs, seen by psychiatry and cleared for discharge home.  After patient got home, he cut himself.  States he does not want to kill himself right now but he wants to "hurt himself" and plans to "hurt anyone that gets in my way".  No dysuria, fevers, genital redness or skin changes    Exam  Abdomen soft, nondistended, minimally tender at right groin/inguinal postop incision site  left flexural surface of forearm with ~3cm superficial skin avulsion/laceration with no active bleeding  Incision intact with no drainage or redness or fluctuance palpated  Scrotal sac empty without redness or tenderness or swelling noted    Assessment/plan  Fleeting SI  Chronic groin pain, clinically not infected  Will call psychiatry for further disposition planning

## 2023-12-29 NOTE — ED BEHAVIORAL HEALTH ASSESSMENT NOTE - NSSUICPROTFACTOTHER_PSY_ALL_CORE
lives in supervised setting with linkage to services provided through [facility] lives in supervised setting with linkage to services provided through [facility]

## 2023-12-29 NOTE — ED BEHAVIORAL HEALTH ASSESSMENT NOTE - SUMMARY
Markus is a 33 year-old male, single, disabled, non-caregiver, living at a [facility], with PMHx of asthma, DM, urinary retention, GERD, BPH, hypothyroidism, HLD, HTN, and b/l myopia, with PPHx of moderate ID, ASD, impulse control disorder, factitious disorder, HAVEN, mood disorders, PTSD and ADHD, hx of multiple past psych admissions (last at [facility] 7/13-21/2023), numerous ED visits for both medical/psych complaints, well-known to telepsychiatry for frequent similar presentations, longstanding h/o SIB (head banging, cutting), no known SA, longstanding h/o endorsing SI, h/o aggression toward staff at , on Depakote 500 mg qAM & 1000mg qPM, Guanfacine 2 mg, Risperdal 3 mg BID, and Remeron 30 mg, prescribed by his PCP, who presents to  [facility] ED on 12/28 for groin pain and while in ED reportedhe has si , subsequently seen by telepsychiatry , assessed and was subsequently discharged. Notably, patient has multiple similar presentations, including most recently on 11/21/23, 11/05/23, 09/27/23, 9/14/23, 8/20/23 and 7/24/23. He returned to this ED shortly after he was discharged from [place] ED , after taking plastic object and making superifical cuts to the forearm and then notifying the staff at the group home who activated 911 call.     Patient's presentation today is consistent with multiple other similar ED presentations. Patient presents regressed and concrete. Reported hearing voice to self harm which is consistent with past presentations, but can not elaborate on AH and states AH are from inside his head. While in ED, pt has been calm, cooperating and not evidencing any acutely dangerous psychiatric symptoms or any symptoms consistent with an acute mood or psychotic disorder that would be amenable to inpatient treatment. Patient is also not exhibiting any acutely dangerous behaviors. His current presentation is consistent with his documented baseline as evidenced by the fact that he provides an inconsistent history. It appears patient uses ER as maladaptive coping mechanism when feeling lonely. Patient is in a safe and supervised setting in his group home and group home has no acute safety concerns. Patient is psychiatrically cleared. Markus is a 33 year-old male, single, disabled, non-caregiver, living at a [facility], with PMHx of asthma, DM, urinary retention, GERD, BPH, hypothyroidism, HLD, HTN, and b/l myopia, with PPHx of moderate ID, ASD, impulse control disorder, factitious disorder, HAVEN, mood disorders, PTSD and ADHD, hx of multiple past psych admissions (last at [facility] 7/13-21/2023), numerous ED visits for both medical/psych complaints, well-known to telepsychiatry for frequent similar presentations, longstanding h/o SIB (head banging, cutting), no known SA, longstanding h/o endorsing SI, h/o aggression toward staff at , on Depakote 500 mg qAM & 1000mg qPM, Guanfacine 2 mg, Risperdal 3 mg BID, and Remeron 30 mg, prescribed by his PCP, who presents to  [facility] ED on 12/28 for groin pain and while in ED reportedhe has si , subsequently seen by telepsychiatry , assessed and was subsequently discharged. Notably, patient has multiple similar presentations, including most recently on 11/21/23, 11/05/23, 09/27/23, 9/14/23, 8/20/23 and 7/24/23. He returned to this ED shortly after he was discharged from [facility] , after taking plastic object and making superifical cuts to the forearm and then notifying the staff at the group home who activated 911 call.     Patient's presentation today is consistent with multiple other similar ED presentations. Patient presents regressed and concrete. Reported hearing voice to self harm which is consistent with past presentations, but can not elaborate on AH and states AH are from inside his head. While in ED, pt has been calm, cooperating and not evidencing any acutely dangerous psychiatric symptoms or any symptoms consistent with an acute mood or psychotic disorder that would be amenable to inpatient treatment. Patient is also not exhibiting any acutely dangerous behaviors. His current presentation is consistent with his documented baseline as evidenced by the fact that he provides an inconsistent history. It appears patient uses ER as maladaptive coping mechanism when feeling lonely. Patient is in a safe and supervised setting in his group home and group home has no acute safety concerns. Patient is psychiatrically cleared. Markus is a 33 year-old male, single, disabled, non-caregiver, living at a [facility], with PMHx of asthma, DM, urinary retention, GERD, BPH, hypothyroidism, HLD, HTN, and b/l myopia, with PPHx of moderate ID, ASD, impulse control disorder, factitious disorder, HAVEN, mood disorders, PTSD and ADHD, hx of multiple past psych admissions (last at [facility] 7/13-21/2023), numerous ED visits for both medical/psych complaints, well-known to telepsychiatry for frequent similar presentations, longstanding h/o SIB (head banging, cutting), no known SA, longstanding h/o endorsing SI, h/o aggression toward staff at , on Depakote 500 mg qAM & 1000mg qPM, Guanfacine 2 mg, Risperdal 3 mg BID, and Remeron 30 mg, prescribed by his PCP, who presents to  [facility] ED on 12/28 for groin pain and while in ED reportedhe has si , subsequently seen by telepsychiatry , assessed and was subsequently discharged. Notably, patient has multiple similar presentations, including most recently on 11/21/23, 11/05/23, 09/27/23, 9/14/23, 8/20/23 and 7/24/23. He returned to this ED shortly after he was discharged from [place] ED , after taking plastic object and making superficial cuts to the forearm and then notifying the staff at the group [place] who activated 911 call.     Patient's presentation today is consistent with multiple other similar ED presentations. and this is his second evaluation to ED for si and ah.  Reported hearing voice to self harm which is consistent with past presentations,  and does not appear to be internally preoccupied or responding to internal stimuli. While in ED, pt has been calm, cooperating and not evidencing any acutely dangerous psychiatric symptoms or any symptoms consistent with an acute mood or psychotic disorder that would be amenable to inpatient treatment. Patient is also not exhibiting any acutely dangerous behaviors. His current presentation is consistent with his documented baseline as evidenced by the fact that he provides an inconsistent history. It appears patient uses ER as maladaptive coping mechanism when feeling lonely and when he does not want to be in the group home . As per group home collateral no safety concerns .  Patient is in a safe and supervised setting in his group home and group home has no acute safety concerns. Patient is psychiatrically cleared. Markus is a 33 year-old male, single, disabled, non-caregiver, living at a [facility], with PMHx of asthma, DM, urinary retention, GERD, BPH, hypothyroidism, HLD, HTN, and b/l myopia, with PPHx of moderate ID, ASD, impulse control disorder, factitious disorder, HAVEN, mood disorders, PTSD and ADHD, hx of multiple past psych admissions (last at [facility] 7/13-21/2023), numerous ED visits for both medical/psych complaints, well-known to telepsychiatry for frequent similar presentations, longstanding h/o SIB (head banging, cutting), no known SA, longstanding h/o endorsing SI, h/o aggression toward staff at , on Depakote 500 mg qAM & 1000mg qPM, Guanfacine 2 mg, Risperdal 3 mg BID, and Remeron 30 mg, prescribed by his PCP, who presents to  [facility] ED on 12/28 for groin pain and while in ED reportedhe has si , subsequently seen by telepsychiatry , assessed and was subsequently discharged. Notably, patient has multiple similar presentations, including most recently on 11/21/23, 11/05/23, 09/27/23, 9/14/23, 8/20/23 and 7/24/23. He returned to this ED shortly after he was discharged from [place] ED , after taking plastic object and making superficial cuts to the forearm and then notifying the staff at the group [place] who activated 911 call.     Patient's presentation today is consistent with multiple other similar ED presentations. and this is his second evaluation to ED for si and ah.  Reported hearing voice to self harm which is consistent with past presentations,  and does not appear to be internally preoccupied or responding to internal stimuli. While in ED, pt has been calm, cooperating and not evidencing any acutely dangerous psychiatric symptoms or any symptoms consistent with an acute mood or psychotic disorder that would be amenable to inpatient treatment. Patient is also not exhibiting any acutely dangerous behaviors. His current presentation is consistent with his documented baseline as evidenced by the fact that he provides an inconsistent history. It appears patient uses ER as maladaptive coping mechanism when feeling lonely and when he does not want to be in the group home . As per group home collateral no safety concerns .  Patient is in a safe and supervised setting in his group home and group home has no acute safety concerns. Patient is psychiatrically cleared.

## 2023-12-29 NOTE — ED BEHAVIORAL HEALTH ASSESSMENT NOTE - SUMMARY
Markus is a 33 year-old male, single, disabled, non-caregiver, living at a [facility], with PMHx of asthma, DM, urinary retention, GERD, BPH, hypothyroidism, HLD, HTN, and b/l myopia, with PPHx of moderate ID, ASD, impulse control disorder, factitious disorder, HAVEN, mood disorders, PTSD and ADHD, hx of multiple past psych admissions (last at [facility] 7/13-21/2023), numerous ED visits for both medical/psych complaints, well-known to telepsychiatry for frequent similar presentations, longstanding h/o SIB (head banging, cutting), no known SA, longstanding h/o endorsing SI, h/o aggression toward staff at , on Depakote 500 mg qAM & 1000mg qPM, Guanfacine 2 mg, Risperdal 3 mg BID, and Remeron 30 mg, prescribed by his PCP, who presents to the ED BIB EMS for wirst pain, and then upon medical examination, disclosing thoughts of harming self. Notably, patient has multiple similar presentations, including most recently on 11/21/23, 11/05/23, 09/27/23, 9/14/23, 8/20/23 and 7/24/23.     Patient's presentation today is consistent with multiple other similar ED presentations. Patient presents child-like and concrete. Reported hearing voice to self harm and reportedly hurt his wrist. While in ED, pt has been calm, cooperating and not evidencing or endorsing any acutely dangerous psychiatric symptoms or any symptoms consistent with an acute mood or psychotic disorder that would be amenable to inpatient treatment. Patient is also not exhibiting any acutely dangerous behaviors. His current presentation is consistent with his documented baseline as evidenced by the fact that he provides an inconsistent history, initially endorses vague complaints of SI(which he subsequently retracts), and perseverates on being admitted to an inpatient unit in the setting of feeling frustrated with his living situation. Collateral obtained from patient's group home is reassuring. Patient is agreeable to returning to residence where he feels safe and was able to review his safety plan form his previous presentation. Presentation is most consistent with diagnoses of moderate intellectual disability, ASD, and malingering. Markus is a 33 year-old male, single, disabled, non-caregiver, living at a [facility], with PMHx of asthma, DM, urinary retention, GERD, BPH, hypothyroidism, HLD, HTN, and b/l myopia, with PPHx of moderate ID, ASD, impulse control disorder, factitious disorder, HAVEN, mood disorders, PTSD and ADHD, hx of multiple past psych admissions (last at [facility] 7/13-21/2023), numerous ED visits for both medical/psych complaints, well-known to telepsychiatry for frequent similar presentations, longstanding h/o SIB (head banging, cutting), no known SA, longstanding h/o endorsing SI, h/o aggression toward staff at , on Depakote 500 mg qAM & 1000mg qPM, Guanfacine 2 mg, Risperdal 3 mg BID, and Remeron 30 mg, prescribed by his PCP, who presents to the ED BIB EMS for wirst pain, and then upon medical examination, disclosing thoughts of harming self. Notably, patient has multiple similar presentations, including most recently on 11/21/23, 11/05/23, 09/27/23, 9/14/23, 8/20/23 and 7/24/23.     Patient's presentation today is consistent with multiple other similar ED presentations. Patient presents child-like and concrete. Reported hearing voice to self harm and reportedly hurt his wrist. While in ED, pt has been calm, cooperating and not evidencing or endorsing any acutely dangerous psychiatric symptoms or any symptoms consistent with an acute mood or psychotic disorder that would be amenable to inpatient treatment. Patient is also not exhibiting any acutely dangerous behaviors. His current presentation is consistent with his documented baseline as evidenced by the fact that he provides an inconsistent history, initially endorses vague complaints of SI(which he subsequently retracts), and perseverates on being admitted to an inpatient unit in the setting of feeling frustrated with his living situation. Collateral obtained from patient's group home is reassuring. Patient is agreeable to returning to residence where he feels safe and was able to review his safety plan form his previous presentation. Presentation is most consistent with diagnoses of moderate intellectual disability, ASD, and malingering. Markus is a 33 year-old male, single, disabled, non-caregiver, living at a [facility], with PMHx of asthma, DM, urinary retention, GERD, BPH, hypothyroidism, HLD, HTN, and b/l myopia, with PPHx of moderate ID, ASD, impulse control disorder, factitious disorder, HAVEN, mood disorders, PTSD and ADHD, hx of multiple past psych admissions (last at [facility] 7/13-21/2023), numerous ED visits for both medical/psych complaints, well-known to telepsychiatry for frequent similar presentations, longstanding h/o SIB (head banging, cutting), no known SA, longstanding h/o endorsing SI, h/o aggression toward staff at , on Depakote 500 mg qAM & 1000mg qPM, Guanfacine 2 mg, Risperdal 3 mg BID, and Remeron 30 mg, prescribed by his PCP, who presents to the ED BIB EMS for wirst pain, and then upon medical examination, disclosing thoughts of harming self. Notably, patient has multiple similar presentations, including most recently on 11/21/23, 11/05/23, 09/27/23, 9/14/23, 8/20/23 and 7/24/23.     Patient's presentation today is consistent with multiple other similar ED presentations. Patient presents regressed and concrete. Reported hearing voice to self harm which is consistent with past presentations, but can not elaborate on AH and states AH are from inside his head. While in ED, pt has been calm, cooperating and not evidencing any acutely dangerous psychiatric symptoms or any symptoms consistent with an acute mood or psychotic disorder that would be amenable to inpatient treatment. Patient is also not exhibiting any acutely dangerous behaviors. His current presentation is consistent with his documented baseline as evidenced by the fact that he provides an inconsistent history. It appears patient uses ER as maladaptive coping mechanism when feeling lonely. Patient is in a safe and supervised setting in his group home and group home has no acute safety concerns. Patient is psychiatrically cleared. Markus is a 33 year-old male, single, disabled, non-caregiver, living at a [facility], with PMHx of asthma, DM, urinary retention, GERD, BPH, hypothyroidism, HLD, HTN, and b/l myopia, with PPHx of moderate ID, ASD, impulse control disorder, factitious disorder, HAVEN, mood disorders, PTSD and ADHD, hx of multiple past psych admissions (last at [facility] 7/13-21/2023), numerous ED visits for both medical/psych complaints, well-known to telepsychiatry for frequent similar presentations, longstanding h/o SIB (head banging, cutting), no known SA, longstanding h/o endorsing SI, h/o aggression toward staff at , on Depakote 500 mg qAM & 1000mg qPM, Guanfacine 2 mg, Risperdal 3 mg BID, and Remeron 30 mg, prescribed by his PCP, who presents to the ED BIB EMS for wirst pain, and then upon medical examination, disclosing thoughts of harming self. Notably, patient has multiple similar presentations, including most recently on 11/21/23, 11/05/23, 09/27/23, 9/14/23, 8/20/23 and 7/24/23.     Patient's presentation today is consistent with multiple other similar ED presentations. Patient presents regressed and concrete. Reported hearing voice to self harm which is consistent with past presentations, but can not elaborate on AH and states AH are from inside his head. While in ED, pt has been calm, cooperating and not evidencing any acutely dangerous psychiatric symptoms or any symptoms consistent with an acute mood or psychotic disorder that would be amenable to inpatient treatment. Patient is also not exhibiting any acutely dangerous behaviors. His current presentation is consistent with his documented baseline as evidenced by the fact that he provides an inconsistent history. It appears patient uses ER as maladaptive coping mechanism when feeling lonely. Patient is in a safe and supervised setting in his group home and group home has no acute safety concerns. Patient is psychiatrically cleared.

## 2023-12-29 NOTE — ED BEHAVIORAL HEALTH ASSESSMENT NOTE - SAFETY PLAN ADDT'L DETAILS
Safety plan discussed with.../Education provided regarding environmental safety / lethal means restriction/Provision of National Suicide Prevention Lifeline 2-170-796-EFFB (9356) Safety plan discussed with.../Education provided regarding environmental safety / lethal means restriction/Provision of National Suicide Prevention Lifeline 9-008-216-LXSH (4569)

## 2023-12-29 NOTE — ED PROVIDER NOTE - PATIENT PORTAL LINK FT
You can access the FollowMyHealth Patient Portal offered by [facility] by registering at the following website: [email or web address]. By joining [employer]’s FollowMyHealth portal, you will also be able to view your health information using other applications (apps) compatible with our system. You can access the FollowMyHealth Patient Portal offered by [facility] by registering at the following website: [email or web address]. By joining [facility] [facility]’s FollowMyHealth portal, you will also be able to view your health information using other applications (apps) compatible with our system.

## 2024-01-01 NOTE — BH PATIENT PROFILE - NSDASASCORE_PSY_ALL_CORE
Boy Deena Bower is a 3 wk.o. male     Admit Date: 2024   LOS: 25 days     At Birth Gestational Age: 25w6d  Corrected Gestational Age 29w 3d  Chronological Age: 3 wk.o.     SYNOPSIS OF NICU COURSE:    21 Y/O , PTL, vag del, hx. Chlamydia, Apgar 6,8,9    : UAC, PRBC  -: SIMV  : PICC line  : Echo small PFO and PDA  -: NIPPV  : Feeds started  : CUS no hemorrhage, ? PVL, repeat in 1 week ()  : PRBC  : D/C UAC, PRBC transfusion,  : Reintubated, SIMV  : DART PROTOCOL  -CUS #2-normal no hemorrhage  7/3-2D echo done # 2 tiny PDA small PFO, L>R shunt, no pulm HTN  - (abd. distention) NPO, CRP, BC, urine culture   Feeds restarted, extubated to CPAP +7   @18;00- Off from TPN   PICC out, full feeds, CPAP +6   Frequent A's and B's, placed on NIPPV, completed DART  7/10 Diuretics   Septic workup, no antibiotics, 14 mL PRBC,   : DART restarted, Lasix x1   Continuous feeds started, Lasix x 1  : Increase Diuril dose to 20 mg/K per dose, chest x-ray better      Pending:  Repeat labs-   Repeat [place] Screening test @ 28DOL and off TPN  Eye exam at 31 weeks     SUBJECTIVE:     Scheduled Meds:   acetaminophen  15 mg/kg Oral Q6H    budesonide  0.25 mg Nebulization Q12H    caffeine citrate  10 mg/kg/day Per OG tube Daily    chlorothiazide  20 mg/kg (Order-Specific) Per OG tube BID    dexAMETHasone  0.025 mg/kg (Order-Specific) Intravenous Q12H    Followed by    [START ON 2024] dexAMETHasone  0.01 mg/kg (Order-Specific) Intravenous Q12H    ergocalciferol  400 Units Oral Daily    ferrous sulfate  2 mg/kg of Fe Per OG tube BID    levalbuterol  0.25 mg Nebulization Q12H     Continuous Infusions:  PRN Meds:  Current Facility-Administered Medications:     zinc oxide-cod liver oil, , Topical (Top), PRN      PHYSICAL EXAM:        Temp:  [98.4 °F (36.9 °C)-99.1 °F (37.3 °C)]   Pulse:  [132-197]   Resp:  [40-56]   BP: (54-83)/(31-55)    SpO2:  [66 %-98 %]   ~Today's Weight: Weight: 900 g (1 lb 15.8 oz) (per night shift)  ~Weight Change Since Birth:13%    General:  Baby is comfortable, in stable condition in isolette.    Head:  Anterior fontanelle is open and flat.    Eyes:  No changes    Chest:  Equal breath sounds bilaterally.  Good air entry bilaterally.  Mild retractions present.    Heart:  S1 and S2 is normal, no murmur heard.    Abdomen:  Full but soft.  No hepatosplenomegaly.    Genitourinary:  No changes    Musculoskeletal/Extremities:  No changes    Neurologic:  No changes      LABS: reviewed    ----------------------------PROGRESS IN NICU-----------------------------------    - 2024     Progress over the last 24 hr was reviewed.    Baby was examined by me.    Discussed plan of care with baby's nurse and nurse practitioner.    NNP notes from previous day reviewed.    Bed Type:  [facility]    Respiratory:   Baby is on noninvasive positive-pressure ventilation, rate of 45, pressures of 26/8 and FiO2 of 25-32%.  Baby had 3 episodes of apnea and bradycardia in the last 24 hours, which is a remarkable improvement from previous day.  Baby was given 1 dose of Lasix yesterday on 07/13, and baby is already on Diuril 15 milligram/kilogram per dose.  Today is day 3. Of Tylenol for PDA.  Baby is on shortened dart protocol of 5 days.  Baby is also getting Xopenex and budesonide q.12 hours.  Baby's CBG repeated this morning was good.  Chest x-ray reviewed.  Chest x-ray shows resolution of the right upper lobe atelectasis and good air entry bilaterally.  There is increased bowel gas present.    Plan:  Plan to increase the diuretic dose to 20 milligram/kilogram per dose q.12h.  Monitor blood gases q.12h.    FEN:     Transpyloric feedings was attempted, baby's feeding tube was noted to be in the duodenal region in the 1st KUB.  Repeat positioning of the transpyloric tube was not successful.  Plan is to continue as NG tube feeding with bolus feedings  over 1 hours.  Total intake in the last 24 hours was 152 cc/kilos per day, 110 calories/kilos per day, urine output of 2.8 cc/kilos per hours and 4 stools.  Baby's tolerating the feeds well.    Plan:  Continue feeding as NG tube with bolus feeding of 16 mL q.3 hours.  Baby is getting expressed breast milk 24 calorie formula.    CVS:   No heart murmur noted.  Today's 3rd day of the Tylenol course for PDA.    ID:   CBC is benign. Blood culture and urine culture from 2024 is negative.    Misc:   Parents are being updated.                                                     0

## 2024-01-01 NOTE — ED PROVIDER NOTE - PRO INTERPRETER NEED 2
English
Global muscle tone and symmetry normal; joint contractures absent; periods of alertness noted; grossly responds to touch, light and sound stimuli; gag reflex present; normal suck-swallow patterns for age; cry with normal variation of amplitude and frequency; tongue motility size, and shape normal without atrophy or fasciculations;  deep tendon knee reflexes normal pattern for age; fay, and grasp reflexes acceptable.

## 2024-01-15 NOTE — ED PROVIDER NOTE - DATE/TIME 2
MT    Caller: Lencho Parker     Topic/issue: Pt states in appt today he was told his fluid level increased over the holidays and he would like to know what he can do to make that better or what he is doing wrong. He would like a call back     Callback Number: [phone] (home)      Thank you  Elena TONY    28-Dec-2023 23:20

## 2024-01-24 ENCOUNTER — OUTPATIENT (OUTPATIENT)
Dept: OUTPATIENT SERVICES | Facility: HOSPITAL | Age: 34
LOS: 1 days | Discharge: HOME | End: 2024-01-24

## 2024-01-24 DIAGNOSIS — Z90.79 ACQUIRED ABSENCE OF OTHER GENITAL ORGAN(S): Chronic | ICD-10-CM

## 2024-01-24 DIAGNOSIS — C62.90 MALIGNANT NEOPLASM OF UNSPECIFIED TESTIS, UNSPECIFIED WHETHER DESCENDED OR UNDESCENDED: ICD-10-CM

## 2024-01-29 ENCOUNTER — EMERGENCY (EMERGENCY)
Facility: HOSPITAL | Age: 34
LOS: 1 days | End: 2024-01-29
Attending: STUDENT IN AN ORGANIZED HEALTH CARE EDUCATION/TRAINING PROGRAM | Admitting: STUDENT IN AN ORGANIZED HEALTH CARE EDUCATION/TRAINING PROGRAM
Payer: MEDICAID

## 2024-01-29 VITALS
WEIGHT: 190.04 LBS | RESPIRATION RATE: 18 BRPM | HEART RATE: 76 BPM | OXYGEN SATURATION: 98 % | HEIGHT: 71 IN | DIASTOLIC BLOOD PRESSURE: 86 MMHG | SYSTOLIC BLOOD PRESSURE: 132 MMHG

## 2024-01-29 DIAGNOSIS — Z90.79 ACQUIRED ABSENCE OF OTHER GENITAL ORGAN(S): Chronic | ICD-10-CM

## 2024-01-29 LAB
ALBUMIN SERPL ELPH-MCNC: 4.4 G/DL — SIGNIFICANT CHANGE UP (ref 3.3–5)
ALP SERPL-CCNC: 116 U/L — SIGNIFICANT CHANGE UP (ref 40–120)
ALT FLD-CCNC: 57 U/L — HIGH (ref 10–45)
ANION GAP SERPL CALC-SCNC: 16 MMOL/L — SIGNIFICANT CHANGE UP (ref 5–17)
APPEARANCE UR: CLEAR — SIGNIFICANT CHANGE UP
AST SERPL-CCNC: 28 U/L — SIGNIFICANT CHANGE UP (ref 10–40)
BACTERIA # UR AUTO: NEGATIVE /HPF — SIGNIFICANT CHANGE UP
BASOPHILS # BLD AUTO: 0.04 K/UL — SIGNIFICANT CHANGE UP (ref 0–0.2)
BASOPHILS NFR BLD AUTO: 0.5 % — SIGNIFICANT CHANGE UP (ref 0–2)
BILIRUB SERPL-MCNC: 0.2 MG/DL — SIGNIFICANT CHANGE UP (ref 0.2–1.2)
BILIRUB UR-MCNC: NEGATIVE — SIGNIFICANT CHANGE UP
BUN SERPL-MCNC: 11 MG/DL — SIGNIFICANT CHANGE UP (ref 7–23)
CALCIUM SERPL-MCNC: 9.6 MG/DL — SIGNIFICANT CHANGE UP (ref 8.4–10.5)
CAST: 0 /LPF — SIGNIFICANT CHANGE UP (ref 0–4)
CHLORIDE SERPL-SCNC: 102 MMOL/L — SIGNIFICANT CHANGE UP (ref 96–108)
CO2 SERPL-SCNC: 21 MMOL/L — LOW (ref 22–31)
COLOR SPEC: YELLOW — SIGNIFICANT CHANGE UP
CREAT SERPL-MCNC: 0.79 MG/DL — SIGNIFICANT CHANGE UP (ref 0.5–1.3)
DIFF PNL FLD: NEGATIVE — SIGNIFICANT CHANGE UP
EGFR: 120 ML/MIN/1.73M2 — SIGNIFICANT CHANGE UP
EOSINOPHIL # BLD AUTO: 0.3 K/UL — SIGNIFICANT CHANGE UP (ref 0–0.5)
EOSINOPHIL NFR BLD AUTO: 3.6 % — SIGNIFICANT CHANGE UP (ref 0–6)
GLUCOSE SERPL-MCNC: 109 MG/DL — HIGH (ref 70–99)
GLUCOSE UR QL: NEGATIVE MG/DL — SIGNIFICANT CHANGE UP
HCT VFR BLD CALC: 44.7 % — SIGNIFICANT CHANGE UP (ref 39–50)
HGB BLD-MCNC: 14 G/DL — SIGNIFICANT CHANGE UP (ref 13–17)
IMM GRANULOCYTES NFR BLD AUTO: 0.8 % — SIGNIFICANT CHANGE UP (ref 0–0.9)
KETONES UR-MCNC: NEGATIVE MG/DL — SIGNIFICANT CHANGE UP
LEUKOCYTE ESTERASE UR-ACNC: NEGATIVE — SIGNIFICANT CHANGE UP
LIDOCAIN IGE QN: 39 U/L — SIGNIFICANT CHANGE UP (ref 7–60)
LYMPHOCYTES # BLD AUTO: 3.34 K/UL — HIGH (ref 1–3.3)
LYMPHOCYTES # BLD AUTO: 40.3 % — SIGNIFICANT CHANGE UP (ref 13–44)
MCHC RBC-ENTMCNC: 24.6 PG — LOW (ref 27–34)
MCHC RBC-ENTMCNC: 31.3 GM/DL — LOW (ref 32–36)
MCV RBC AUTO: 78.6 FL — LOW (ref 80–100)
MONOCYTES # BLD AUTO: 0.63 K/UL — SIGNIFICANT CHANGE UP (ref 0–0.9)
MONOCYTES NFR BLD AUTO: 7.6 % — SIGNIFICANT CHANGE UP (ref 2–14)
NEUTROPHILS # BLD AUTO: 3.91 K/UL — SIGNIFICANT CHANGE UP (ref 1.8–7.4)
NEUTROPHILS NFR BLD AUTO: 47.2 % — SIGNIFICANT CHANGE UP (ref 43–77)
NITRITE UR-MCNC: NEGATIVE — SIGNIFICANT CHANGE UP
NRBC # BLD: 0 /100 WBCS — SIGNIFICANT CHANGE UP (ref 0–0)
PH UR: 6 — SIGNIFICANT CHANGE UP (ref 5–8)
PLATELET # BLD AUTO: 206 K/UL — SIGNIFICANT CHANGE UP (ref 150–400)
POTASSIUM SERPL-MCNC: 4.1 MMOL/L — SIGNIFICANT CHANGE UP (ref 3.5–5.3)
POTASSIUM SERPL-SCNC: 4.1 MMOL/L — SIGNIFICANT CHANGE UP (ref 3.5–5.3)
PROT SERPL-MCNC: 7.4 G/DL — SIGNIFICANT CHANGE UP (ref 6–8.3)
PROT UR-MCNC: NEGATIVE MG/DL — SIGNIFICANT CHANGE UP
RBC # BLD: 5.69 M/UL — SIGNIFICANT CHANGE UP (ref 4.2–5.8)
RBC # FLD: 14.6 % — HIGH (ref 10.3–14.5)
RBC CASTS # UR COMP ASSIST: 0 /HPF — SIGNIFICANT CHANGE UP (ref 0–4)
SODIUM SERPL-SCNC: 139 MMOL/L — SIGNIFICANT CHANGE UP (ref 135–145)
SP GR SPEC: 1.01 — SIGNIFICANT CHANGE UP (ref 1–1.03)
SQUAMOUS # UR AUTO: 0 /HPF — SIGNIFICANT CHANGE UP (ref 0–5)
UROBILINOGEN FLD QL: 0.2 MG/DL — SIGNIFICANT CHANGE UP (ref 0.2–1)
WBC # BLD: 8.29 K/UL — SIGNIFICANT CHANGE UP (ref 3.8–10.5)
WBC # FLD AUTO: 8.29 K/UL — SIGNIFICANT CHANGE UP (ref 3.8–10.5)
WBC UR QL: 0 /HPF — SIGNIFICANT CHANGE UP (ref 0–5)

## 2024-01-29 NOTE — ED PROVIDER NOTE - ATTENDING CONTRIBUTION TO CARE
33M w/ hx of borderline personality disorder, autism, bipolar disorder, diabetes, asthma, presenting to ER from group home for diffculty urinating he states he last urinated 1 day ago, he has had painful urination yesterday, pt w/ also "cardiac" history for which he has had to wear a monitor, denies prior heart sx, no catheterization, no stents, pt w/ no sob, no nausea no vomiting, no dizziness, no arm/leg weakness numbness. pt w/ no lightheadedness, no dizziness. Pt w/ no fevers, no chills.   pt w/ known enlarged prostate, has required catheterizations in the past  pt w/ no ah/vh/si/hi.   pt denies any recent self harm  I have reviewed the triage vital signs. Const: no acute distress, Well-developed, Eyes: no conjunctival injection and no scleral icterus ENMT: Moist mucus membranes, CVS: +S1/S2, radial/DP pulse 2+ bilaterally  RESP: Unlabored respiratory effort, Clear to auscultation bilaterally GI: Nontender/Nondistended soft abdomen, no CVA tenderness MSK: Extremities w/o deformity or ttp, Psych: Awake, Alert, & Orientedx3;  Appropriate mood and affect, cooperative  Plan for labs bladder scan may require a catheter for further management of symptoms.

## 2024-01-29 NOTE — ED PROVIDER NOTE - OBJECTIVE STATEMENT
33y M PMHx from a group home, DM, BPH, testicular cancer (s/p removal), bipolar disorder, and asthma, p/w difficulty urinating. States this has happened before and needed mckeon placement and admission. Patient states he has some pain in his bladder. Patient also has some palpitations intermittently. Denies fevers, back pain, hematuria.

## 2024-01-29 NOTE — ED PROVIDER NOTE - CLINICAL SUMMARY MEDICAL DECISION MAKING FREE TEXT BOX
Spoke with patient mother who states patient has needed admission in the past due to the BPH. Patient with 450mL in bladder. Will place mckeon, get labs, UA. Patient likely to need admission since he can't take care of the mckeon at the group home.

## 2024-01-29 NOTE — ED PROVIDER NOTE - PHYSICAL EXAMINATION
GENERAL: no acute distress, non-toxic appearing  HEAD: normocephalic, atraumatic  HEENT: PERRLA, EOMI, normal conjunctiva, oral mucosa moist  CARDIAC: regular rate and rhythm, no appreciable murmurs  PULM: clear to ascultation bilaterally, no crackles, rales, rhonchi, or wheezing  GI: abdomen nondistended, soft, nontender  : no CVA tenderness, +suprapubic tenderness  NEURO: alert and oriented x 3, normal speech, no gross neurologic deficit  MSK: no visible deformities  SKIN: no visible rashes, dry, well-perfused  PSYCH: appropriate mood and affect

## 2024-01-29 NOTE — ED ADULT NURSE NOTE - OBJECTIVE STATEMENT
34 y/o male presents to the ED endorsing urinary retention and chest pain x1 day. A/Ox4. Ambulatory without assistive devices at baseline. PMH: Bipolar, Asthma, Testicular CA, DM and BPH. Patient endorses last urination to be on 1/28/24. Patient endorses previous episodes of urinary retention x3 years ago requiring a mckeon catheter placement. Patient denies dyspnea, fever, cough, chills, nausea and vomiting. Patient denies SI/HI. Patient on CM, NSR. Safety and comfort provided.

## 2024-01-29 NOTE — ED PROVIDER NOTE - CHIEF COMPLAINT
The patient is a 33y Male complaining of  The patient is a 33y Male complaining of difficulty urinating

## 2024-01-30 ENCOUNTER — TRANSCRIPTION ENCOUNTER (OUTPATIENT)
Age: 34
End: 2024-01-30

## 2024-01-30 VITALS
SYSTOLIC BLOOD PRESSURE: 119 MMHG | TEMPERATURE: 98 F | RESPIRATION RATE: 18 BRPM | OXYGEN SATURATION: 96 % | DIASTOLIC BLOOD PRESSURE: 83 MMHG | HEART RATE: 84 BPM

## 2024-01-30 DIAGNOSIS — Z29.9 ENCOUNTER FOR PROPHYLACTIC MEASURES, UNSPECIFIED: ICD-10-CM

## 2024-01-30 DIAGNOSIS — E11.9 TYPE 2 DIABETES MELLITUS WITHOUT COMPLICATIONS: ICD-10-CM

## 2024-01-30 DIAGNOSIS — R33.9 RETENTION OF URINE, UNSPECIFIED: ICD-10-CM

## 2024-01-30 DIAGNOSIS — F31.9 BIPOLAR DISORDER, UNSPECIFIED: ICD-10-CM

## 2024-01-30 PROCEDURE — 83690 ASSAY OF LIPASE: CPT

## 2024-01-30 PROCEDURE — 99285 EMERGENCY DEPT VISIT HI MDM: CPT | Mod: 25

## 2024-01-30 PROCEDURE — 80053 COMPREHEN METABOLIC PANEL: CPT

## 2024-01-30 PROCEDURE — 93005 ELECTROCARDIOGRAM TRACING: CPT

## 2024-01-30 PROCEDURE — 81001 URINALYSIS AUTO W/SCOPE: CPT

## 2024-01-30 PROCEDURE — 85025 COMPLETE CBC W/AUTO DIFF WBC: CPT

## 2024-01-30 PROCEDURE — 36415 COLL VENOUS BLD VENIPUNCTURE: CPT

## 2024-01-30 PROCEDURE — 87086 URINE CULTURE/COLONY COUNT: CPT

## 2024-01-30 RX ORDER — LORATADINE 10 MG/1
10 TABLET ORAL DAILY
Refills: 0 | Status: DISCONTINUED | OUTPATIENT
Start: 2024-01-30 | End: 2024-01-30

## 2024-01-30 RX ORDER — RISPERIDONE 4 MG/1
4 TABLET ORAL
Refills: 0 | Status: DISCONTINUED | OUTPATIENT
Start: 2024-01-30 | End: 2024-01-30

## 2024-01-30 RX ORDER — FINASTERIDE 5 MG/1
5 TABLET, FILM COATED ORAL DAILY
Refills: 0 | Status: DISCONTINUED | OUTPATIENT
Start: 2024-01-30 | End: 2024-01-30

## 2024-01-30 RX ORDER — PANTOPRAZOLE SODIUM 20 MG/1
40 TABLET, DELAYED RELEASE ORAL
Refills: 0 | Status: DISCONTINUED | OUTPATIENT
Start: 2024-01-30 | End: 2024-01-30

## 2024-01-30 RX ORDER — MIRTAZAPINE 45 MG/1
7.5 TABLET, ORALLY DISINTEGRATING ORAL AT BEDTIME
Refills: 0 | Status: DISCONTINUED | OUTPATIENT
Start: 2024-01-30 | End: 2024-01-30

## 2024-01-30 RX ORDER — LACTULOSE 10 G/15ML
10 SOLUTION ORAL EVERY 8 HOURS
Refills: 0 | Status: DISCONTINUED | OUTPATIENT
Start: 2024-01-30 | End: 2024-01-30

## 2024-01-30 RX ORDER — SENNA PLUS 8.6 MG/1
2 TABLET ORAL AT BEDTIME
Refills: 0 | Status: DISCONTINUED | OUTPATIENT
Start: 2024-01-30 | End: 2024-01-30

## 2024-01-30 RX ORDER — MIRTAZAPINE 45 MG/1
1 TABLET, ORALLY DISINTEGRATING ORAL
Refills: 0 | DISCHARGE

## 2024-01-30 RX ORDER — DIVALPROEX SODIUM 500 MG/1
500 TABLET, DELAYED RELEASE ORAL
Refills: 0 | Status: DISCONTINUED | OUTPATIENT
Start: 2024-01-30 | End: 2024-01-30

## 2024-01-30 RX ORDER — ACETAMINOPHEN 500 MG
650 TABLET ORAL EVERY 6 HOURS
Refills: 0 | Status: DISCONTINUED | OUTPATIENT
Start: 2024-01-30 | End: 2024-01-30

## 2024-01-30 RX ORDER — LANOLIN ALCOHOL/MO/W.PET/CERES
3 CREAM (GRAM) TOPICAL AT BEDTIME
Refills: 0 | Status: DISCONTINUED | OUTPATIENT
Start: 2024-01-30 | End: 2024-01-30

## 2024-01-30 RX ORDER — TAMSULOSIN HYDROCHLORIDE 0.4 MG/1
0.4 CAPSULE ORAL AT BEDTIME
Refills: 0 | Status: DISCONTINUED | OUTPATIENT
Start: 2024-01-30 | End: 2024-01-30

## 2024-01-30 RX ORDER — FINASTERIDE 5 MG/1
1 TABLET, FILM COATED ORAL
Qty: 30 | Refills: 0
Start: 2024-01-30 | End: 2024-02-28

## 2024-01-30 RX ORDER — ACETAMINOPHEN 500 MG
2 TABLET ORAL
Qty: 0 | Refills: 0 | DISCHARGE
Start: 2024-01-30

## 2024-01-30 RX ORDER — LEVOTHYROXINE SODIUM 125 MCG
50 TABLET ORAL DAILY
Refills: 0 | Status: DISCONTINUED | OUTPATIENT
Start: 2024-01-30 | End: 2024-01-30

## 2024-01-30 RX ORDER — ATORVASTATIN CALCIUM 80 MG/1
10 TABLET, FILM COATED ORAL AT BEDTIME
Refills: 0 | Status: DISCONTINUED | OUTPATIENT
Start: 2024-01-30 | End: 2024-01-30

## 2024-01-30 RX ADMIN — LORATADINE 10 MILLIGRAM(S): 10 TABLET ORAL at 12:48

## 2024-01-30 RX ADMIN — PANTOPRAZOLE SODIUM 40 MILLIGRAM(S): 20 TABLET, DELAYED RELEASE ORAL at 12:48

## 2024-01-30 RX ADMIN — LACTULOSE 10 GRAM(S): 10 SOLUTION ORAL at 14:40

## 2024-01-30 RX ADMIN — DIVALPROEX SODIUM 500 MILLIGRAM(S): 500 TABLET, DELAYED RELEASE ORAL at 12:48

## 2024-01-30 NOTE — H&P ADULT - ASSESSMENT
34 yo M from [facility] with PMH DM2, BPH with hx of urine retention, bipolar disorder, asthma, testicular cancer s/p resection p/w urinary burning adm with urine retention s/p mckeon.

## 2024-01-30 NOTE — DISCHARGE NOTE PROVIDER - NSDCMRMEDTOKEN_GEN_ALL_CORE_FT
atorvastatin 10 mg oral tablet: 1 tab(s) orally once a day (in the evening)  busPIRone 15 mg oral tablet: 1 tab(s) orally once a day (in the evening)  divalproex sodium 500 mg oral tablet, extended release: 1 tab(s) orally 2 times a day at 7 AM and 7 PM  docusate sodium 100 mg oral tablet: 1 tab(s) orally 3 times a day  guanFACINE 2 mg oral tablet: 1 tab(s) orally once a day (in the morning)  levothyroxine 50 mcg (0.05 mg) oral tablet: 1 tab(s) orally once a day  loratadine 10 mg oral tablet: 1 tab(s) orally once a day  metFORMIN 1000 mg oral tablet: 1 tab(s) orally 2 times a day (with meals)  mirtazapine 15 mg oral tablet: 1.5 tab(s) orally once a day (at bedtime)  pantoprazole 20 mg oral delayed release tablet: 1 tab(s) orally once a day  risperiDONE 4 mg oral tablet: 1 tab(s) orally 2 times a day at 7 AM and 7 PM  senna leaf extract oral tablet: 2 tab(s) orally once a day (at bedtime)  tamsulosin 0.4 mg oral capsule: 1 cap(s) orally once a day (at bedtime)   acetaminophen 325 mg oral tablet: 2 tab(s) orally every 6 hours As needed Temp greater or equal to 38C (100.4F), Mild Pain (1 - 3)  atorvastatin 10 mg oral tablet: 1 tab(s) orally once a day (in the evening)  busPIRone 15 mg oral tablet: 1 tab(s) orally once a day (in the evening)  divalproex sodium 500 mg oral tablet, extended release: 1 tab(s) orally 2 times a day at 7 AM and 7 PM  docusate sodium 100 mg oral tablet: 1 tab(s) orally 3 times a day  guanFACINE 2 mg oral tablet: 1 tab(s) orally once a day (in the morning)  levothyroxine 50 mcg (0.05 mg) oral tablet: 1 tab(s) orally once a day  loratadine 10 mg oral tablet: 1 tab(s) orally once a day  metFORMIN 1000 mg oral tablet: 1 tab(s) orally 2 times a day (with meals)  mirtazapine 15 mg oral tablet: 1.5 tab(s) orally once a day (at bedtime)  pantoprazole 20 mg oral delayed release tablet: 1 tab(s) orally once a day  risperiDONE 4 mg oral tablet: 1 tab(s) orally 2 times a day at 7 AM and 7 PM  senna leaf extract oral tablet: 2 tab(s) orally once a day (at bedtime)  tamsulosin 0.4 mg oral capsule: 1 cap(s) orally once a day (at bedtime)   acetaminophen 325 mg oral tablet: 2 tab(s) orally every 6 hours As needed Temp greater or equal to 38C (100.4F), Mild Pain (1 - 3)  atorvastatin 10 mg oral tablet: 1 tab(s) orally once a day (in the evening)  busPIRone 15 mg oral tablet: 1 tab(s) orally once a day (in the evening)  divalproex sodium 500 mg oral tablet, extended release: 1 tab(s) orally 2 times a day at 7 AM and 7 PM  docusate sodium 100 mg oral tablet: 1 tab(s) orally 3 times a day  finasteride 5 mg oral tablet: 1 tab(s) orally once a day  guanFACINE 2 mg oral tablet: 1 tab(s) orally once a day (in the morning)  levothyroxine 50 mcg (0.05 mg) oral tablet: 1 tab(s) orally once a day  loratadine 10 mg oral tablet: 1 tab(s) orally once a day  metFORMIN 1000 mg oral tablet: 1 tab(s) orally 2 times a day (with meals)  mirtazapine 15 mg oral tablet: 1.5 tab(s) orally once a day (at bedtime)  pantoprazole 20 mg oral delayed release tablet: 1 tab(s) orally once a day  risperiDONE 4 mg oral tablet: 1 tab(s) orally 2 times a day at 7 AM and 7 PM  senna leaf extract oral tablet: 2 tab(s) orally once a day (at bedtime)  tamsulosin 0.4 mg oral capsule: 1 cap(s) orally once a day (at bedtime)

## 2024-01-30 NOTE — DISCHARGE NOTE PROVIDER - NSDCCPCAREPLAN_GEN_ALL_CORE_FT
PRINCIPAL DISCHARGE DIAGNOSIS  Diagnosis: Urinary retention  Assessment and Plan of Treatment: finasteride added  please follow up with you urologist  you do not have a UTI  please make sure you are having normal bowel movements everyday. if you are constipated, take miralax and senna.

## 2024-01-30 NOTE — H&P ADULT - PROBLEM SELECTOR PLAN 1
s/p mckeon placement with 500 cc urine  UA neg for UTI  - will dc mckeon and perform TOV today  - c/w flomax  - would D/C 1:1 constant observation as patient is not suicidal, climbing out of bed, and is calm on exam

## 2024-01-30 NOTE — H&P ADULT - HISTORY OF PRESENT ILLNESS
34 yo M from [facility] with PMH DM2, BPH with hx of urine retention, bipolar disorder, asthma, testicular cancer s/p resection p/w urinary burning. Pt states he started experiencing lower abdominal discomfort and burning on urination, similar to prior episodes where he was diagnosed with urine retention and required a mckeon. Per ED documentation, pt's group home cannot take him back with a mckeon and therefore patient needed an admission. Pt seen at bedside and denies any complaints, n/v/f/chills, cp, sob, abd pain. Feels well. Denies SI or thoughts of hurting others.    In the ED, VSS  s/p mckeon placement with 500 cc urine

## 2024-01-30 NOTE — H&P ADULT - PROBLEM SELECTOR PLAN 4
dvt ppx: low risk, not needed. c/w ambulation.  Dispo: DC back to group home today if passes TO. If he does not, will consult urology for evaluation.  d/w group [place] [facility] [phone], all ques answered

## 2024-01-30 NOTE — DISCHARGE NOTE PROVIDER - NSFOLLOWUPCLINICS_GEN_ALL_ED_FT
[facility] [place] [facility]  [address] [place] [address]  Phone: [phone]  Fax:   Follow Up Time: 2 weeks

## 2024-01-30 NOTE — H&P ADULT - NSHPREVIEWOFSYSTEMS_GEN_ALL_CORE
Review of Systems:   CONSTITUTIONAL: No fever, weight loss  EYES: No eye pain, visual disturbances, or discharge  ENMT:  No difficulty hearing, tinnitus, vertigo; No sinus or throat pain  RESPIRATORY: No SOB. No cough, wheezing, chills or hemoptysis  CARDIOVASCULAR: No chest pain, palpitations, dizziness, or leg swelling  GASTROINTESTINAL: No abdominal or epigastric pain. No nausea, vomiting, or hematemesis; No diarrhea or constipation. No melena or hematochezia.  GENITOURINARY: +urinary burning  NEUROLOGICAL: No headaches, memory loss, loss of strength, numbness, or tremors  SKIN: No itching, burning, rashes, or lesions   ENDOCRINE: No heat or cold intolerance; No hair loss  MUSCULOSKELETAL: No joint pain or swelling; No muscle, back pain  PSYCHIATRIC: No depression, anxiety, mood swings, or difficulty sleeping  HEME/LYMPH: No easy bruising, or bleeding gums

## 2024-01-30 NOTE — H&P ADULT - NSHPLABSRESULTS_GEN_ALL_CORE
LABS:                         14.0   8.29  )-----------( 206      ( 2024 23:14 )             44.7         139  |  102  |  11  ----------------------------<  109<H>  4.1   |  21<L>  |  0.79    Ca    9.6      2024 23:14    TPro  7.4  /  Alb  4.4  /  TBili  0.2  /  DBili  x   /  AST  28  /  ALT  57<H>  /  AlkPhos  116        Urinalysis Basic - ( 2024 23:44 )    Color: Yellow / Appearance: Clear / S.009 / pH: x  Gluc: x / Ketone: Negative mg/dL  / Bili: Negative / Urobili: 0.2 mg/dL   Blood: x / Protein: Negative mg/dL / Nitrite: Negative   Leuk Esterase: Negative / RBC: 0 /HPF / WBC 0 /HPF   Sq Epi: x / Non Sq Epi: 0 /HPF / Bacteria: Negative /HPF

## 2024-01-30 NOTE — H&P ADULT - NSHPPHYSICALEXAM_GEN_ALL_CORE
Vital Signs Last 24 Hrs  T(C): 36.6 (30 Jan 2024 08:04), Max: 36.8 (30 Jan 2024 04:50)  T(F): 97.9 (30 Jan 2024 08:04), Max: 98.2 (30 Jan 2024 04:50)  HR: 72 (30 Jan 2024 08:04) (72 - 92)  BP: 105/74 (30 Jan 2024 08:04) (104/70 - 132/86)  BP(mean): 85 (30 Jan 2024 08:04) (81 - 85)  RR: 16 (30 Jan 2024 08:04) (16 - 18)  SpO2: 96% (30 Jan 2024 08:04) (95% - 99%)    Parameters below as of 30 Jan 2024 08:04  Patient On (Oxygen Delivery Method): room air        PHYSICAL EXAM:  GENERAL:  Well appearing, in NAD  HEAD:  NCAT  EYES: conjunctiva clear  NECK: Supple, No JVD  CHEST/LUNG: CTA B/L. No w/r/r.  HEART: Reg rate. Normal S1, S2. No m/r/g.   ABDOMEN: SNTND  mckeon in place w/ clear yellow urine  EXTREMITIES:  2+ Peripheral Pulses, No clubbing, cyanosis, edema.  PSYCH: pleasant affect  SKIN: multiple tattoos

## 2024-01-30 NOTE — DISCHARGE NOTE NURSING/CASE MANAGEMENT/SOCIAL WORK - NSDCVIVACCINE_GEN_ALL_CORE_FT
Tdap; 20-Dec-2018 12:21; Lo Anaya (RN); [employer]; [id number] (Exp. Date: 02-Nov-2020); IntraMuscular; Deltoid Left.; 0.5 milliLiter(s); VIS (VIS Published: 09-May-2013, VIS Presented: 20-Dec-2018);   Tdap; 26-Mar-2019 18:59; Shavon Barrios (RN); [employer]; [id number] (Exp. Date: 26-Feb-2021); IntraMuscular; Deltoid Left.; 0.5 milliLiter(s); VIS (VIS Published: 09-May-2013, VIS Presented: 26-Mar-2019);   Tdap; 01-Dec-2021 09:35; Zamzam Coulter (RN); [employer]; [id number] (Exp. Date: 09-Sep-2023); IntraMuscular; Deltoid Left.; 0.5 milliLiter(s); VIS (VIS Published: 09-May-2013, VIS Presented: 01-Dec-2021);   Tdap; 21-Jul-2022 01:28; Rsoe Hancock (RN); [employer]; [id number] (Exp. Date: 14-Oct-2024); IntraMuscular; Deltoid Right.; 0.5 milliLiter(s); VIS (VIS Published: 09-May-2013, VIS Presented: 21-Jul-2022);   Tdap; 12-Jun-2023 21:01; Diann Paul (RN); [employer]; [id number] (Exp. Date: 08-Mar-2025); IntraMuscular; Deltoid Left.; 0.5 milliLiter(s); VIS (VIS Published: 09-May-2013, VIS Presented: 12-Jun-2023);   Tdap; 24-Jul-2023 23:01; Diann Paul (RN); [employer]; [id number] (Exp. Date: 18-Aug-2025); IntraMuscular; Deltoid Right.; 0.5 milliLiter(s); VIS (VIS Published: 09-May-2013, VIS Presented: 24-Jul-2023);

## 2024-01-30 NOTE — CHART NOTE - NSCHARTNOTEFT_GEN_A_CORE
Notified by RN that patient retaining 544   Discussed with medical attending Dr. Reis; patient started on bowel regimen and proscar added  Consulted urology; recommended replacing mckeon rather than straight cath     Josselyn Fitch PA-C  Dept of Medicine

## 2024-01-30 NOTE — ED ADULT NURSE REASSESSMENT NOTE - NS ED NURSE REASSESS COMMENT FT1
2 RNs unable to obtain IV access. MD Nieto made aware. MD to place US line.
Bladder scan noted to be 418mL. MD Hutchison made aware. RN to place indwelling mckeon catheter.
MD Nieto made aware x2, patient requires US guided IV. No new RN interventions at this time.
Sarabia catheter inserted using sterile technique, draining by gravity, secured with StatLock. Second RN present to confirm sterility. 500mL of clear, yellow urine obtained. Safety and comfort provided.
bladder scan performed. pt found to have 544 ml of urine in bladder, admitting provider notified
contacted admitting team regarding pt concerns. pt is stating feels the urge to go after removal of Sarabia but has not been able to go. per provider Josselyn Fitch would like a bladder scan on pt
pt was able to void independently approximately 500ml of urine. admitting provider aware
repeated bladder scan on pt, currently has 488 ml. admitting provider dudley perez
Received report from Era Gómez. pt is A&Ox3 able to follow all commands. Pulse, motor, sensation present and equal in all 4 extremities. pt Breathing spontaneous and unlabored on, Skin warm and dry and of color appropriate for ethnicity , moves all extremities, speech clear. pending admission bed. no further nurse intervention needed at this time.

## 2024-01-30 NOTE — H&P ADULT - PROBLEM SELECTOR PLAN 2
controlled  - will hold off on ISS as FS controlled controlled o metformin  - will hold off on ISS as FS controlled

## 2024-01-30 NOTE — ED ADULT NURSE REASSESSMENT NOTE - NEURO BEHAVIOR
Telephone Encounter  11/21/2023    [facility] placed a call to patient, following up on referral for housing instability. [facility] reached patient voicemail. [facility] left name, contact information and reason for the call.      Next Outreach  11/28/2023
calm

## 2024-01-30 NOTE — DISCHARGE NOTE NURSING/CASE MANAGEMENT/SOCIAL WORK - PATIENT PORTAL LINK FT
You can access the FollowMyHealth Patient Portal offered by [facility] by registering at the following website: [email or web address]. By joining [facility]’s FollowMyHealth portal, you will also be able to view your health information using other applications (apps) compatible with our system.
[id number] Detailed

## 2024-01-30 NOTE — DISCHARGE NOTE PROVIDER - HOSPITAL COURSE
HPI:  34 yo M from [facility] with PMH DM2, BPH with hx of urine retention, bipolar disorder, asthma, testicular cancer s/p resection p/w urinary burning. Pt states he started experiencing lower abdominal discomfort and burning on urination, similar to prior episodes where he was diagnosed with urine retention and required a mckeon. Per ED documentation, pt's group home cannot take him back with a mckeon and therefore patient needed an admission. Pt seen at bedside and denies any complaints, n/v/f/chills, cp, sob, abd pain. Feels well. Denies SI or thoughts of hurting others.    In the ED, VSS  s/p mckeon placement with 500 cc urine (30 Jan 2024 08:39)    Hospital Course:  Patient admitted for urinary retention. A mckeon was placed in the ED on 1/29 with 500 cc urine; UA neg for UTI. Mckeon discontinued on 1/30 for trial of void and patient to continue flomax    Important Medication Changes and Reason:  >N/A    Active or Pending Issues Requiring Follow-up:  >PCP follow up within one week for further outpatient management     Advanced Directives:   [X] Full code  [ ] DNR  [ ] Hospice    Discharge Diagnoses:  >Urinary retention     Discharge/dispo/med rec discussed with medical attending Dr. Reis. Patient is medically cleared for discharge with outpatient follow up     HPI:  34 yo M from [facility] with PMH DM2, BPH with hx of urine retention, bipolar disorder, asthma, testicular cancer s/p resection p/w urinary burning. Pt states he started experiencing lower abdominal discomfort and burning on urination, similar to prior episodes where he was diagnosed with urine retention and required a mckeon. Per ED documentation, pt's group home cannot take him back with a mckeon and therefore patient needed an admission. Pt seen at bedside and denies any complaints, n/v/f/chills, cp, sob, abd pain. Feels well. Denies SI or thoughts of hurting others.    In the ED, VSS  s/p mckeon placement with 500 cc urine (30 Jan 2024 08:39)    Hospital Course:  Patient admitted for urinary retention. A mckeon was placed in the ED on 1/29 with 500 cc urine; UA neg for UTI. Mckeon discontinued on 1/30 for trial of void and patient to continue flomax. Pt eventually voided on his own, finasteride was added. Pt to follow up with  urology outpatient.    Important Medication Changes and Reason:  finasteride 5 mg daily    Active or Pending Issues Requiring Follow-up:  >PCP follow up within one week for further outpatient management     Advanced Directives:   [X] Full code  [ ] DNR  [ ] Hospice    Discharge Diagnoses:  >acute Urinary retention     Discharge/dispo/med rec discussed with medical attending Dr. Reis. Patient is medically cleared for discharge with outpatient follow up     HPI:  32 [facility], BPH with hx of urine retention, bipolar disorder, asthma, testicular cancer s/p resection p/w urinary burning. Pt states he started experiencing lower abdominal discomfort and burning on urination, similar to prior episodes where he was diagnosed with urine retention and required a mckeon. Per ED documentation, pt's group home cannot take him back with a mckeon and therefore patient needed an admission. Pt seen at bedside and denies any complaints, n/v/f/chills, cp, sob, abd pain. Feels well. Denies SI or thoughts of hurting others.    In the ED, VSS  s/p mckeon placement with 500 cc urine (30 Jan 2024 08:39)    Hospital Course:  Patient admitted for urinary retention. A mckeon was placed in the ED on 1/29 with 500 cc urine; UA neg for UTI. Mckeon discontinued on 1/30 for trial of void and patient to continue flomax. Pt eventually voided on his own, finasteride was added. Pt to follow up with  urology outpatient.    Important Medication Changes and Reason:  finasteride 5 mg daily    Active or Pending Issues Requiring Follow-up:  >PCP follow up within one week for further outpatient management     Advanced Directives:   [X] Full code  [ ] DNR  [ ] Hospice    Discharge Diagnoses:  >acute urinary retention     Discharge/dispo/med rec discussed with medical attending Dr. Reis. Patient is medically cleared for discharge with outpatient follow up

## 2024-01-30 NOTE — H&P ADULT - TIME BILLING
- Reviewing, and interpreting labs and testing.  - Independently obtaining a review of systems and performing a physical exam  - Counselling and educating patient and family regarding interpretation of aforementioned items and plan of care.

## 2024-01-31 LAB
CULTURE RESULTS: NO GROWTH — SIGNIFICANT CHANGE UP
SPECIMEN SOURCE: SIGNIFICANT CHANGE UP

## 2024-02-02 NOTE — CHART NOTE - NSCHARTNOTEFT_GEN_A_CORE
Patient was outreached but did not answer. A voicemail was left for the patient to return our call. Left vm on [phone]. Called patients mother number on  [phone] who advised us the best contact is [phone]. I left a voicemail there as well.

## 2024-02-05 ENCOUNTER — APPOINTMENT (OUTPATIENT)
Dept: HEMATOLOGY ONCOLOGY | Facility: CLINIC | Age: 34
End: 2024-02-05
Payer: MEDICAID

## 2024-02-05 ENCOUNTER — RESULT REVIEW (OUTPATIENT)
Age: 34
End: 2024-02-05

## 2024-02-05 ENCOUNTER — EMERGENCY (EMERGENCY)
Facility: HOSPITAL | Age: 34
LOS: 1 days | Discharge: HOME | End: 2024-02-05
Attending: EMERGENCY MEDICINE
Payer: MEDICAID

## 2024-02-05 VITALS
HEIGHT: 71 IN | WEIGHT: 284.4 LBS | RESPIRATION RATE: 18 BRPM | TEMPERATURE: 98 F | HEART RATE: 90 BPM | DIASTOLIC BLOOD PRESSURE: 63 MMHG | SYSTOLIC BLOOD PRESSURE: 145 MMHG | OXYGEN SATURATION: 97 %

## 2024-02-05 VITALS
RESPIRATION RATE: 16 BRPM | WEIGHT: 284.39 LBS | TEMPERATURE: 98.5 F | OXYGEN SATURATION: 96 % | HEIGHT: 70 IN | BODY MASS INDEX: 40.71 KG/M2 | DIASTOLIC BLOOD PRESSURE: 92 MMHG | SYSTOLIC BLOOD PRESSURE: 138 MMHG | HEART RATE: 81 BPM

## 2024-02-05 DIAGNOSIS — Z90.79 ACQUIRED ABSENCE OF OTHER GENITAL ORGAN(S): Chronic | ICD-10-CM

## 2024-02-05 LAB
AFP-TM SERPL-MCNC: 4.2 NG/ML
ALBUMIN SERPL ELPH-MCNC: 4.5 G/DL
ALP BLD-CCNC: 107 U/L
ALT SERPL-CCNC: 46 U/L
ANION GAP SERPL CALC-SCNC: 17 MMOL/L
AST SERPL-CCNC: 24 U/L
BASOPHILS # BLD AUTO: 0.03 K/UL — SIGNIFICANT CHANGE UP (ref 0–0.2)
BASOPHILS NFR BLD AUTO: 0.5 % — SIGNIFICANT CHANGE UP (ref 0–2)
BILIRUB SERPL-MCNC: 0.2 MG/DL
BUN SERPL-MCNC: 12 MG/DL
CALCIUM SERPL-MCNC: 9.5 MG/DL
CHLORIDE SERPL-SCNC: 100 MMOL/L
CO2 SERPL-SCNC: 25 MMOL/L
CREAT SERPL-MCNC: 0.82 MG/DL
EGFR: 118 ML/MIN/1.73M2
EOSINOPHIL # BLD AUTO: 0.43 K/UL — SIGNIFICANT CHANGE UP (ref 0–0.5)
EOSINOPHIL NFR BLD AUTO: 6.6 % — HIGH (ref 0–6)
GLUCOSE SERPL-MCNC: 97 MG/DL
HCG SERPL-MCNC: <1 MIU/ML
HCT VFR BLD CALC: 43.7 % — SIGNIFICANT CHANGE UP (ref 39–50)
HGB BLD-MCNC: 13.7 G/DL — SIGNIFICANT CHANGE UP (ref 13–17)
IMM GRANULOCYTES NFR BLD AUTO: 0.8 % — SIGNIFICANT CHANGE UP (ref 0–0.9)
LDH SERPL-CCNC: 253 U/L
LYMPHOCYTES # BLD AUTO: 2.58 K/UL — SIGNIFICANT CHANGE UP (ref 1–3.3)
LYMPHOCYTES # BLD AUTO: 39.6 % — SIGNIFICANT CHANGE UP (ref 13–44)
MCHC RBC-ENTMCNC: 25 PG — LOW (ref 27–34)
MCHC RBC-ENTMCNC: 31.4 G/DL — LOW (ref 32–36)
MCV RBC AUTO: 79.6 FL — LOW (ref 80–100)
MONOCYTES # BLD AUTO: 0.47 K/UL — SIGNIFICANT CHANGE UP (ref 0–0.9)
MONOCYTES NFR BLD AUTO: 7.2 % — SIGNIFICANT CHANGE UP (ref 2–14)
NEUTROPHILS # BLD AUTO: 2.96 K/UL — SIGNIFICANT CHANGE UP (ref 1.8–7.4)
NEUTROPHILS NFR BLD AUTO: 45.3 % — SIGNIFICANT CHANGE UP (ref 43–77)
NRBC # BLD: 0 /100 WBCS — SIGNIFICANT CHANGE UP (ref 0–0)
PLATELET # BLD AUTO: 210 K/UL — SIGNIFICANT CHANGE UP (ref 150–400)
POTASSIUM SERPL-SCNC: 4.6 MMOL/L
PROT SERPL-MCNC: 6.8 G/DL
RBC # BLD: 5.49 M/UL — SIGNIFICANT CHANGE UP (ref 4.2–5.8)
RBC # FLD: 14.5 % — SIGNIFICANT CHANGE UP (ref 10.3–14.5)
SODIUM SERPL-SCNC: 142 MMOL/L
WBC # BLD: 6.52 K/UL — SIGNIFICANT CHANGE UP (ref 3.8–10.5)
WBC # FLD AUTO: 6.52 K/UL — SIGNIFICANT CHANGE UP (ref 3.8–10.5)

## 2024-02-05 PROCEDURE — 99282 EMERGENCY DEPT VISIT SF MDM: CPT

## 2024-02-05 PROCEDURE — 99213 OFFICE O/P EST LOW 20 MIN: CPT

## 2024-02-05 PROCEDURE — 99284 EMERGENCY DEPT VISIT MOD MDM: CPT

## 2024-02-05 RX ORDER — KETOROLAC TROMETHAMINE 30 MG/ML
30 SYRINGE (ML) INJECTION ONCE
Refills: 0 | Status: DISCONTINUED | OUTPATIENT
Start: 2024-02-05 | End: 2024-02-05

## 2024-02-05 RX ORDER — SODIUM CHLORIDE 9 MG/ML
1000 INJECTION INTRAMUSCULAR; INTRAVENOUS; SUBCUTANEOUS ONCE
Refills: 0 | Status: DISCONTINUED | OUTPATIENT
Start: 2024-02-05 | End: 2024-02-05

## 2024-02-05 RX ORDER — FAMOTIDINE 10 MG/ML
20 INJECTION INTRAVENOUS ONCE
Refills: 0 | Status: DISCONTINUED | OUTPATIENT
Start: 2024-02-05 | End: 2024-02-05
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
## 2024-02-05 NOTE — ED PROVIDER NOTE - NSFOLLOWUPINSTRUCTIONS_ED_ALL_ED_FT
Diarrhea, Adult  Diarrhea is frequent loose and sometimes watery bowel movements. Diarrhea can make you feel weak and cause you to become dehydrated. Dehydration is a condition in which there is not enough water or other fluids in the body. Dehydration can make you tired and thirsty, cause you to have a dry mouth, and decrease how often you urinate.    Diarrhea typically lasts 2–3 days. However, it can last longer if it is a sign of something more serious. It is important to treat your diarrhea as told by your health care provider.    Follow these instructions at home:  Eating and drinking    A bottle of clear fruit juice and glass of water.   Bread, rice, and cereal from the grain group.  Follow these recommendations as told by your health care provider:  Take an oral rehydration solution (ORS). This is an over-the-counter medicine that helps return your body to its normal balance of nutrients and water. It is found at pharmacies and retail stores.  Drink enough fluid to keep your urine pale yellow.  Drink fluids such as water, diluted fruit juice, and low-calorie sports drinks. You can drink milk also, if desired. Sucking on ice chips is another way to get fluids.  Avoid drinking fluids that contain a lot of sugar or caffeine, such as soda, energy drinks, and regular sports drinks.  Avoid alcohol.  Eat bland, easy-to-digest foods in small amounts as you are able. These foods include bananas, applesauce, rice, lean meats, toast, and crackers.  Avoid spicy or fatty foods.  Medicines    Take over-the-counter and prescription medicines only as told by your health care provider.  If you were prescribed antibiotics, take them as told by your health care provider. Do not stop using the antibiotic even if you start to feel better.  General instructions    Washing hands with soap and water.  Wash your hands often using soap and water for at least 20 seconds. If soap and water are not available, use hand . Others in the household should wash their hands as well. Hands should be washed:  After using the toilet or changing a diaper.  Before preparing, cooking, or serving food.  While caring for a sick person or while visiting someone in a hospital.  Rest at home while you recover.  Take a warm bath to relieve any burning or pain from frequent diarrhea episodes.  Watch your condition for any changes.  Contact a health care provider if:  You have a fever.  Your diarrhea gets worse.  You have new symptoms.  You vomit every time you eat or drink.  You feel light-headed, dizzy, or have a headache.  You have muscle cramps.  You have signs of dehydration, such as:  Dark urine, very little urine, or no urine.  Cracked lips.  Dry mouth.  Sunken eyes.  Sleepiness.  Weakness.  You have bloody or black stools or stools that look like tar.  You have severe pain, cramping, or bloating in your abdomen.  Your skin feels cold and clammy.  You feel confused.  Get help right away if:  You have chest pain or your heart is beating very quickly.  You have trouble breathing or you are breathing very quickly.  You feel extremely weak or you faint.  These symptoms may be an emergency. Get help right away. Call 911.  Do not wait to see if the symptoms will go away.  Do not drive yourself to the hospital.  This information is not intended to replace advice given to you by your health care provider. Make sure you discuss any questions you have with your health care provider.    Document Revised: 06/06/2023 Document Reviewed: 06/06/2023  [employer] Patient Education © 2023 [employer]  [employer] logo  Terms and Conditions  Privacy Policy  Editorial Policy  All content on this site: Copyright © 2024 Elsevier, its licensors, and contributors. All rights are reserved, including those for text and data mining, AI training, and similar technologies. For all open access content, the Creative Commons licensing terms apply.  Cookies are used by this site. To decline or learn more, visit our Cookies page.

## 2024-02-05 NOTE — REVIEW OF SYSTEMS
[Diarrhea: Grade 0] : Diarrhea: Grade 0 [Negative] : Allergic/Immunologic [FreeTextEntry8] : right side wound with tenderness

## 2024-02-05 NOTE — ED PROVIDER NOTE - PROGRESS NOTE DETAILS
pt states pain has resolved and he would like to go home. states he doesn't want workup or meds. advised to maintain POintake. f/u with PMD. return precautions discussed.

## 2024-02-05 NOTE — ED PROVIDER NOTE - PATIENT PORTAL LINK FT
You can access the FollowMyHealth Patient Portal offered by [facility] by registering at the following website: [email or web address]. By joining [employer]’s FollowMyHealth portal, you will also be able to view your health information using other applications (apps) compatible with our system.
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
## 2024-02-05 NOTE — ED PROVIDER NOTE - OBJECTIVE STATEMENT
33 year old male PMH DM, BPH, bipolar, asthma, testicular cancer in remission coming in for abd pain and diarrhea starting today. denies all other complaints.

## 2024-02-06 ENCOUNTER — EMERGENCY (EMERGENCY)
Facility: HOSPITAL | Age: 34
LOS: 1 days | Discharge: HOME | End: 2024-02-06
Attending: EMERGENCY MEDICINE | Admitting: EMERGENCY MEDICINE
Payer: MEDICAID

## 2024-02-06 VITALS
HEART RATE: 75 BPM | SYSTOLIC BLOOD PRESSURE: 115 MMHG | DIASTOLIC BLOOD PRESSURE: 78 MMHG | RESPIRATION RATE: 17 BRPM | HEIGHT: 71 IN | TEMPERATURE: 99 F

## 2024-02-06 DIAGNOSIS — Z90.79 ACQUIRED ABSENCE OF OTHER GENITAL ORGAN(S): Chronic | ICD-10-CM

## 2024-02-06 PROCEDURE — 99284 EMERGENCY DEPT VISIT MOD MDM: CPT

## 2024-02-06 PROCEDURE — 90792 PSYCH DIAG EVAL W/MED SRVCS: CPT | Mod: GC

## 2024-02-06 NOTE — ED BEHAVIORAL HEALTH ASSESSMENT NOTE - DESCRIPTION
DM, HLD, BPH, hypothyroidism, GERD as per HPI Patient calm and cooperative in ED, no IM PRN medications or restraints.
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
## 2024-02-06 NOTE — ED BEHAVIORAL HEALTH ASSESSMENT NOTE - NSBHPSYCHOLCOGABN_PSY_A_CORE
think the month is January and that we are at [facility] (not [facility])/disoriented to time/disoriented to place
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
## 2024-02-06 NOTE — ED BEHAVIORAL HEALTH ASSESSMENT NOTE - OTHER
group home ([facility]) group home staff member Emily ([phone]) staff not internally preoccupied, rocking back and forth continuously limited (chronic) concrete lives in supervised setting; connected to care group home

## 2024-02-06 NOTE — ED BEHAVIORAL HEALTH ASSESSMENT NOTE - CURRENT MEDICATION
Home Medications:  acetaminophen 325 mg oral tablet: 2 tab(s) orally every 6 hours As needed Temp greater or equal to 38C (100.4F), Mild Pain (1 - 3) (30 Jan 2024 14:36)  atorvastatin 10 mg oral tablet: 1 tab(s) orally once a day (in the evening) (30 Jan 2024 09:17)  busPIRone 15 mg oral tablet: 1 tab(s) orally once a day (in the evening) (30 Jan 2024 09:17)  divalproex sodium 500 mg oral tablet, extended release: 1 tab(s) orally 2 times a day at 7 AM and 7 PM (30 Jan 2024 09:17)  docusate sodium 100 mg oral tablet: 1 tab(s) orally 3 times a day (30 Jan 2024 09:16)  guanFACINE 2 mg oral tablet: 1 tab(s) orally once a day (in the morning) (30 Jan 2024 09:17)  mirtazapine 15 mg oral tablet: 1.5 tab(s) orally once a day (at bedtime) (30 Jan 2024 09:17)  pantoprazole 20 mg oral delayed release tablet: 1 tab(s) orally once a day (30 Jan 2024 09:17)  risperiDONE 4 mg oral tablet: 1 tab(s) orally 2 times a day at 7 AM and 7 PM (30 Jan 2024 09:17)

## 2024-02-06 NOTE — ED BEHAVIORAL HEALTH ASSESSMENT NOTE - NSBHROSSYSTEMS_PSY_ALL_CORE
Psychiatric Pt currently denies experiencing any headaches; has no dizziness, no blurring of vision; no cough/ colds, no sore throat; no fever. not complaining of any chest pains, no SOB, no palpitations; no abdominal/ flank pains, no nausea/ vomiting or diarrhea/ constipation; no dysuria, no hesitancy, no polyuria, no hematuria; no pruritus/ no rashes nor any edema. denied any muscle/ joint pains/Psychiatric

## 2024-02-06 NOTE — ED ADULT NURSE NOTE - CHIEF COMPLAINT QUOTE
pt from [facility] c/o Suicidal thoughts, without plan. Denies homicidal ideation, auditory/visual hallucinations. Calm and cooperative.

## 2024-02-06 NOTE — ED BEHAVIORAL HEALTH ASSESSMENT NOTE - NSBHMSESPEECH_PSY_A_CORE
Normal volume, rate, productivity, spontaneity and articulation no disorganization/Normal volume, rate, productivity, spontaneity and articulation
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
## 2024-02-06 NOTE — ED BEHAVIORAL HEALTH ASSESSMENT NOTE - NSBHMSEATTEN_PSY_A_CORE
Addended by: KURTIS NORWOOD on: 10/14/2019 01:30 PM     Modules accepted: Orders     Normal Impaired

## 2024-02-06 NOTE — ED ADULT TRIAGE NOTE - CHIEF COMPLAINT QUOTE
pt from [facility] home c/o Suicidal thoughts, without plan. Denies homicidal ideation, auditory/visual hallucinations. Calm and cooperative.

## 2024-02-06 NOTE — ED PROVIDER NOTE - PHYSICAL EXAMINATION
Healing wound on dorsal surface of proximal wrist.  Patient is asked very childlike and not appropriate for severity of situation.  Patient is smiling during entire history including on commenting on suicidal ideation.

## 2024-02-06 NOTE — ED BEHAVIORAL HEALTH ASSESSMENT NOTE - VIOLENCE PROTECTIVE FACTORS:
Residential stability/Sobriety/Engagement in treatment/Insight into violence risk and need for management/treatment/Good treatment response/compliance/Other

## 2024-02-06 NOTE — ED PROVIDER NOTE - PATIENT PORTAL LINK FT
You can access the FollowMyHealth Patient Portal offered by [facility] by registering at the following website: [email or web address]. By joining [employer]’s FollowMyHealth portal, you will also be able to view your health information using other applications (apps) compatible with our system.
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
## 2024-02-06 NOTE — ED BEHAVIORAL HEALTH ASSESSMENT NOTE - NSBHINFOSOURCE_PSY_ALL_CORE
[As Noted in HPI] : as noted in HPI [Recent Weight Gain (___ Lbs)] : recent [unfilled] ~Ulb weight gain [Joint Pain] : joint pain [Negative] : Psychiatric Patient/Collateral...

## 2024-02-06 NOTE — ED BEHAVIORAL HEALTH ASSESSMENT NOTE - MEDICATIONS (PRESCRIPTIONS, DIRECTIONS)
Recommend giving patient missed doses of his evening Depakote 500mg, Risperdal 4mg, Buspar 15mg, and Mirtazapine 15mg prior to arranging transportation back to .
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
## 2024-02-06 NOTE — ED PROVIDER NOTE - NSFOLLOWUPINSTRUCTIONS_ED_ALL_ED_FT
Please continue your current psychiatric medication regimen.  Please follow-up with your primary psychiatrist in the next 1 week.  Please return to the ED if you have any thoughts of hurting yourself or others.
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
## 2024-02-06 NOTE — ED BEHAVIORAL HEALTH ASSESSMENT NOTE - SAFETY PLAN ADDT'L DETAILS
Safety plan discussed with.../Education provided regarding environmental safety / lethal means restriction/Provision of National Suicide Prevention Lifeline 4-193-802-AHQV (1256)

## 2024-02-06 NOTE — ED PROVIDER NOTE - CLINICAL SUMMARY MEDICAL DECISION MAKING FREE TEXT BOX
33-year-old male with history of psychiatric disease not taking meds for 3 days and now with suicidal ideation.  Will get collateral from group home and have psych eval.

## 2024-02-06 NOTE — ED PROVIDER NOTE - ENMT, MLM
Airway patent, Nasal mucosa clear. Mouth with normal mucosa. Throat has no vesicles, no oropharyngeal exudates and uvula is midline. 100

## 2024-02-06 NOTE — ED ADULT NURSE NOTE - NSFALLUNIVINTERV_ED_ALL_ED
Bed/Stretcher in lowest position, wheels locked, appropriate side rails in place/Call bell, personal items and telephone in reach/Instruct patient to call for assistance before getting out of bed/chair/stretcher/Non-slip footwear applied when patient is off stretcher/[place] to call system/Physically safe environment - no spills, clutter or unnecessary equipment/Purposeful proactive rounding/Room/bathroom lighting operational, light cord in reach
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
## 2024-02-07 DIAGNOSIS — F79 UNSPECIFIED INTELLECTUAL DISABILITIES: ICD-10-CM

## 2024-02-07 DIAGNOSIS — F84.0 AUTISTIC DISORDER: ICD-10-CM

## 2024-02-07 RX ORDER — RISPERIDONE 4 MG/1
4 TABLET ORAL ONCE
Refills: 0 | Status: COMPLETED | OUTPATIENT
Start: 2024-02-07 | End: 2024-02-07

## 2024-02-07 RX ORDER — MIRTAZAPINE 45 MG/1
15 TABLET, ORALLY DISINTEGRATING ORAL ONCE
Refills: 0 | Status: COMPLETED | OUTPATIENT
Start: 2024-02-07 | End: 2024-02-07

## 2024-02-07 RX ORDER — DIVALPROEX SODIUM 500 MG/1
500 TABLET, DELAYED RELEASE ORAL ONCE
Refills: 0 | Status: COMPLETED | OUTPATIENT
Start: 2024-02-07 | End: 2024-02-07

## 2024-02-07 RX ADMIN — DIVALPROEX SODIUM 500 MILLIGRAM(S): 500 TABLET, DELAYED RELEASE ORAL at 01:02

## 2024-02-07 RX ADMIN — MIRTAZAPINE 15 MILLIGRAM(S): 45 TABLET, ORALLY DISINTEGRATING ORAL at 01:02

## 2024-02-07 RX ADMIN — Medication 15 MILLIGRAM(S): at 01:02

## 2024-02-07 RX ADMIN — RISPERIDONE 4 MILLIGRAM(S): 4 TABLET ORAL at 01:02

## 2024-02-07 NOTE — ED ADULT NURSE NOTE - CADM POA URETHRAL CATHETER
Call placed to the patient per [facility] referral.    Voice message left for patient to call back. Phone number and hours of business provided.     This is the 1st attempt to reach the patient.  Will defer per protocol.     No

## 2024-02-07 NOTE — ED ADULT NURSE REASSESSMENT NOTE - NS ED NURSE REASSESS COMMENT FT1
pt awakened, a&ox3, calm and cooperative. Denies current S/I H/I I/P. MC for DC to group home w/ residence staff. DC instructions provided to residence staff.

## 2024-02-07 NOTE — ED BEHAVIORAL HEALTH NOTE - BEHAVIORAL HEALTH NOTE
KEKE informed by psych MD that pt will be discharged; pt resides in a [facility] group home residence.  SW called residence to inform them of pt's pending discharge.  Per staff member, Jemal, pt is a resident and can return.  There is a staff member in the waiting room, Mr. Borrero, [phone], who can transport pt back to the residence.  KEKE spoke with Mr. Borrero, who reports that he will drive pt back home when he is ready for discharge.  Provider aware and in agreement with the plan.

## 2024-02-08 NOTE — ED BEHAVIORAL HEALTH NOTE - BEHAVIORAL HEALTH NOTE
HIGH RISK DISCHARGE: Writer called [facility] , patient went to [facility] following ED visit at [facility].

## 2024-02-21 ENCOUNTER — EMERGENCY (EMERGENCY)
Facility: HOSPITAL | Age: 34
LOS: 1 days | Discharge: HOME | End: 2024-02-21
Attending: EMERGENCY MEDICINE
Payer: MEDICAID

## 2024-02-21 VITALS
DIASTOLIC BLOOD PRESSURE: 64 MMHG | WEIGHT: 289.03 LBS | HEART RATE: 88 BPM | TEMPERATURE: 99 F | RESPIRATION RATE: 18 BRPM | HEIGHT: 71 IN | OXYGEN SATURATION: 96 % | SYSTOLIC BLOOD PRESSURE: 107 MMHG

## 2024-02-21 DIAGNOSIS — Z90.79 ACQUIRED ABSENCE OF OTHER GENITAL ORGAN(S): Chronic | ICD-10-CM

## 2024-02-21 LAB
FLUAV AG NPH QL: SIGNIFICANT CHANGE UP
FLUBV AG NPH QL: SIGNIFICANT CHANGE UP
SARS-COV-2 RNA SPEC QL NAA+PROBE: SIGNIFICANT CHANGE UP

## 2024-02-21 PROCEDURE — 99285 EMERGENCY DEPT VISIT HI MDM: CPT

## 2024-02-21 RX ORDER — SODIUM CHLORIDE 9 MG/ML
1000 INJECTION INTRAMUSCULAR; INTRAVENOUS; SUBCUTANEOUS ONCE
Refills: 0 | Status: COMPLETED | OUTPATIENT
Start: 2024-02-21 | End: 2024-02-21

## 2024-02-21 RX ORDER — LOPERAMIDE HCL 2 MG
2 TABLET ORAL ONCE
Refills: 0 | Status: COMPLETED | OUTPATIENT
Start: 2024-02-21 | End: 2024-02-21

## 2024-02-21 NOTE — ED PROVIDER NOTE - NS ED MD DISPO DISCHARGE
[FreeTextEntry1] : at baseline\par has rhinitis\par no sig sputum\par smoke free\par denies any sig dyspnea\par  no fever, chills, chest pain\par has prn ventolin, feels no difference\par post RT r lung, repeat CT chest planned\par \par 
Home

## 2024-02-21 NOTE — ED PROVIDER NOTE - NSFOLLOWUPINSTRUCTIONS_ED_ALL_ED_FT
You were seen in the emergency department for: diarrhea  Your diagnosis for this visit was: likely viral illness  Make sure you stay hydrated. Take Tylenol/Motrin for pain.  We recommend you follow up with: your primary care doctor.    Please return to the Emergency Department if you experience any of the following symptoms:   - Shortness of breath or trouble breathing  - Pressure, pain or tightness in the chest  - Face drooping, arm weakness or speech difficulty  - Persistence of severe vomiting  - Head injury or loss of consciousness  - Nonstop bleeding or an open wound    (1) Follow up with your primary care physician within the next 24-48 hours as discussed. In addition, we did not find evidence of a life threatening illness on your testing here today, but listed below are the specialists that will be necessary to see as an outpatient to continue the workup.  Please call the numbers listed below or [phone] to set up the necessary appointments.  (2) Take Tylenol (up to 1000mg or 1 g)  and/or Motrin (up to 600mg) up to every 6 hours as needed for pain.   (3) If you had an IV (intravenous) line placed, it was removed. Sometimes, after IV removal, that area can be tender for a few days; if it develops redness and swelling, those could be signs of infection; in which case, return to the Emergency Department for assessment.  (4) Please continue taking all of your home medications as directed.

## 2024-02-21 NOTE — ED PROVIDER NOTE - PROGRESS NOTE DETAILS
Labs ok.   CTAP: 1.  Hepatomegaly with diffuse steatosis.  2.  Trace bilateral pleural effusions.    Medically cleared, likely viral  Psych cleared patient as well. Will d/c
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
## 2024-02-21 NOTE — ED PROVIDER NOTE - SKIN, MLM
fresh superficial laceration to right forearm measuring about 3 cm, and superficial skin avulsion measuring about 1 cm diameter to left forearm

## 2024-02-21 NOTE — ED ADULT TRIAGE NOTE - CHIEF COMPLAINT QUOTE
coming group home with flu like symptoms headache , dizziness nausea diarrhea, neiece might have covid after returning today from family house

## 2024-02-21 NOTE — ED PROVIDER NOTE - PATIENT PORTAL LINK FT
You can access the FollowMyHealth Patient Portal offered by [facility] by registering at the following website: [email or web address]. By joining [facility]’s FollowMyHealth portal, you will also be able to view your health information using other applications (apps) compatible with our system.

## 2024-02-22 VITALS
HEART RATE: 61 BPM | OXYGEN SATURATION: 96 % | RESPIRATION RATE: 18 BRPM | SYSTOLIC BLOOD PRESSURE: 114 MMHG | DIASTOLIC BLOOD PRESSURE: 71 MMHG | TEMPERATURE: 98 F

## 2024-02-22 DIAGNOSIS — F79 UNSPECIFIED INTELLECTUAL DISABILITIES: ICD-10-CM

## 2024-02-22 LAB
ALBUMIN SERPL ELPH-MCNC: 3.1 G/DL — LOW (ref 3.5–5)
ALP SERPL-CCNC: 98 U/L — SIGNIFICANT CHANGE UP (ref 40–120)
ALT FLD-CCNC: 45 U/L DA — SIGNIFICANT CHANGE UP (ref 10–60)
ANION GAP SERPL CALC-SCNC: 6 MMOL/L — SIGNIFICANT CHANGE UP (ref 5–17)
APAP SERPL-MCNC: <2 UG/ML — LOW (ref 10–30)
APPEARANCE UR: CLEAR — SIGNIFICANT CHANGE UP
AST SERPL-CCNC: 16 U/L — SIGNIFICANT CHANGE UP (ref 10–40)
BASOPHILS # BLD AUTO: 0.04 K/UL — SIGNIFICANT CHANGE UP (ref 0–0.2)
BASOPHILS NFR BLD AUTO: 0.6 % — SIGNIFICANT CHANGE UP (ref 0–2)
BILIRUB SERPL-MCNC: 0.1 MG/DL — LOW (ref 0.2–1.2)
BILIRUB UR-MCNC: NEGATIVE — SIGNIFICANT CHANGE UP
BUN SERPL-MCNC: 14 MG/DL — SIGNIFICANT CHANGE UP (ref 7–18)
CALCIUM SERPL-MCNC: 9 MG/DL — SIGNIFICANT CHANGE UP (ref 8.4–10.5)
CHLORIDE SERPL-SCNC: 103 MMOL/L — SIGNIFICANT CHANGE UP (ref 96–108)
CO2 SERPL-SCNC: 26 MMOL/L — SIGNIFICANT CHANGE UP (ref 22–31)
COLOR SPEC: YELLOW — SIGNIFICANT CHANGE UP
CREAT SERPL-MCNC: 0.84 MG/DL — SIGNIFICANT CHANGE UP (ref 0.5–1.3)
DIFF PNL FLD: NEGATIVE — SIGNIFICANT CHANGE UP
EGFR: 117 ML/MIN/1.73M2 — SIGNIFICANT CHANGE UP
EOSINOPHIL # BLD AUTO: 0.44 K/UL — SIGNIFICANT CHANGE UP (ref 0–0.5)
EOSINOPHIL NFR BLD AUTO: 6.6 % — HIGH (ref 0–6)
ETHANOL SERPL-MCNC: 17 MG/DL — HIGH (ref 0–10)
GLUCOSE SERPL-MCNC: 177 MG/DL — HIGH (ref 70–99)
GLUCOSE UR QL: NEGATIVE MG/DL — SIGNIFICANT CHANGE UP
HCT VFR BLD CALC: 38.6 % — LOW (ref 39–50)
HGB BLD-MCNC: 12.1 G/DL — LOW (ref 13–17)
IMM GRANULOCYTES NFR BLD AUTO: 1.5 % — HIGH (ref 0–0.9)
KETONES UR-MCNC: ABNORMAL MG/DL
LEUKOCYTE ESTERASE UR-ACNC: NEGATIVE — SIGNIFICANT CHANGE UP
LIDOCAIN IGE QN: 40 U/L — SIGNIFICANT CHANGE UP (ref 13–75)
LYMPHOCYTES # BLD AUTO: 2.41 K/UL — SIGNIFICANT CHANGE UP (ref 1–3.3)
LYMPHOCYTES # BLD AUTO: 36.3 % — SIGNIFICANT CHANGE UP (ref 13–44)
MCHC RBC-ENTMCNC: 24.5 PG — LOW (ref 27–34)
MCHC RBC-ENTMCNC: 31.3 GM/DL — LOW (ref 32–36)
MCV RBC AUTO: 78.1 FL — LOW (ref 80–100)
MONOCYTES # BLD AUTO: 0.54 K/UL — SIGNIFICANT CHANGE UP (ref 0–0.9)
MONOCYTES NFR BLD AUTO: 8.1 % — SIGNIFICANT CHANGE UP (ref 2–14)
NEUTROPHILS # BLD AUTO: 3.11 K/UL — SIGNIFICANT CHANGE UP (ref 1.8–7.4)
NEUTROPHILS NFR BLD AUTO: 46.9 % — SIGNIFICANT CHANGE UP (ref 43–77)
NITRITE UR-MCNC: NEGATIVE — SIGNIFICANT CHANGE UP
NRBC # BLD: 0 /100 WBCS — SIGNIFICANT CHANGE UP (ref 0–0)
PCP SPEC-MCNC: SIGNIFICANT CHANGE UP
PH UR: 6 — SIGNIFICANT CHANGE UP (ref 5–8)
PLATELET # BLD AUTO: 204 K/UL — SIGNIFICANT CHANGE UP (ref 150–400)
POTASSIUM SERPL-MCNC: 4 MMOL/L — SIGNIFICANT CHANGE UP (ref 3.5–5.3)
POTASSIUM SERPL-SCNC: 4 MMOL/L — SIGNIFICANT CHANGE UP (ref 3.5–5.3)
PROT SERPL-MCNC: 6.2 G/DL — SIGNIFICANT CHANGE UP (ref 6–8.3)
PROT UR-MCNC: NEGATIVE MG/DL — SIGNIFICANT CHANGE UP
RBC # BLD: 4.94 M/UL — SIGNIFICANT CHANGE UP (ref 4.2–5.8)
RBC # FLD: 14.5 % — SIGNIFICANT CHANGE UP (ref 10.3–14.5)
SALICYLATES SERPL-MCNC: <1.7 MG/DL — LOW (ref 2.8–20)
SODIUM SERPL-SCNC: 135 MMOL/L — SIGNIFICANT CHANGE UP (ref 135–145)
SP GR SPEC: 1.02 — SIGNIFICANT CHANGE UP (ref 1–1.03)
UROBILINOGEN FLD QL: 1 MG/DL — SIGNIFICANT CHANGE UP (ref 0.2–1)
WBC # BLD: 6.64 K/UL — SIGNIFICANT CHANGE UP (ref 3.8–10.5)
WBC # FLD AUTO: 6.64 K/UL — SIGNIFICANT CHANGE UP (ref 3.8–10.5)

## 2024-02-22 PROCEDURE — 80053 COMPREHEN METABOLIC PANEL: CPT

## 2024-02-22 PROCEDURE — 81003 URINALYSIS AUTO W/O SCOPE: CPT

## 2024-02-22 PROCEDURE — 74177 CT ABD & PELVIS W/CONTRAST: CPT | Mod: 26,MA

## 2024-02-22 PROCEDURE — 87086 URINE CULTURE/COLONY COUNT: CPT

## 2024-02-22 PROCEDURE — 99285 EMERGENCY DEPT VISIT HI MDM: CPT | Mod: 25

## 2024-02-22 PROCEDURE — 85025 COMPLETE CBC W/AUTO DIFF WBC: CPT

## 2024-02-22 PROCEDURE — 80307 DRUG TEST PRSMV CHEM ANLYZR: CPT

## 2024-02-22 PROCEDURE — 90792 PSYCH DIAG EVAL W/MED SRVCS: CPT | Mod: 95

## 2024-02-22 PROCEDURE — 74177 CT ABD & PELVIS W/CONTRAST: CPT | Mod: MA

## 2024-02-22 PROCEDURE — 83690 ASSAY OF LIPASE: CPT

## 2024-02-22 PROCEDURE — 87637 SARSCOV2&INF A&B&RSV AMP PRB: CPT

## 2024-02-22 PROCEDURE — 36415 COLL VENOUS BLD VENIPUNCTURE: CPT

## 2024-02-22 RX ORDER — ACETAMINOPHEN 500 MG
650 TABLET ORAL ONCE
Refills: 0 | Status: COMPLETED | OUTPATIENT
Start: 2024-02-22 | End: 2024-02-22

## 2024-02-22 RX ADMIN — Medication 2 MILLIGRAM(S): at 05:58

## 2024-02-22 RX ADMIN — SODIUM CHLORIDE 1000 MILLILITER(S): 9 INJECTION INTRAMUSCULAR; INTRAVENOUS; SUBCUTANEOUS at 05:59

## 2024-02-22 RX ADMIN — Medication 650 MILLIGRAM(S): at 07:19

## 2024-02-22 NOTE — ED BEHAVIORAL HEALTH NOTE - BEHAVIORAL HEALTH NOTE
==================  PRE-HOSPITAL COURSE  ===================  SOURCE:  [facility]id and secondhand ED documentation  DETAILS: BIB EMS from  for flu sx  =========  ED COURSE  =========  SOURCE:  ANALI Del Cid and secondhand ED documentation.  ARRIVAL:  Per chart and RN, patient arrived via EMS. Per RN, patient was calm upon arrival, and cooperative with triage process.  BELONGINGS:  Per RN, patient arrived with belongings. All belongings were provided to hospital security, and patient currently in a gown with a 1:1 staff member.  BEHAVIOR: RN described patient to be calm and cooperative, presenting with linear thought process, AAOx3, presenting with normal mood and congruent affect, remains in behavioral and impulse control, is not violent/aggressive. RN stated that the patient is not endorsing SI/HI/A/VH. RN stated that there are no visible marks, bruises, or lacerations on the body. RN stated that the patient appears to be well-groomed, maintains good hygiene.  TREATMENT:  Per chart and RN, patient did not receive PRN medications.   VISITORS:  Per RN, GH aide at bedside.         COVID Exposure Screen- collateral (i.e. third-party, chart review, belongings, etc; include EMS and ED staff)   ---------------------------------------------------  1. Has the patient had a COVID-19 test in the last 90 days? Unknown.   2. Has the patient tested positive for COVID-19 antibodies? Unknown.   3. Has the patient received 2 doses of the COVID-19 vaccine? Unknown  4. In the past 10 days, has the patient been around anyone with a positive COVID-19 test?* Unknown.   5. Has the patient been out of [place] within the past 10 days? Unknown      Patient gives permission to obtain collateral from [facility] [place]:  (  ) Yes  ( x )  No  Rationale for overriding objection            (  ) Lack of capacity. Details: ________            ( x ) Assessing risk of danger to self/others. Details: Pt endorsing self harming behavior, need to assess for safety.  Rationale for selecting specific collateral source            (  ) Potential to impact risk of danger to self/others and no alternative equivalent. Details: _____       FOR EACH PERSON  •	NAME: Rubén  •	NUMBER: [phone] (personal cell) / : [phone]  •	RELATIONSHIP:  aide  •	RELIABILITY: Reliable but limited   •	COMMENTS:  aide reports he is not aware of the events that brought pt to the ED.  reports he was asked by his supervisor to come to the ED to escort pt.  aide reports pt frequently comes to ED for similar complaints of cutting self.  aide reports pt is on a 1:1 in the  and doesn’t have access to sharp objects.  aide reports pt often states he wants to go to the hospital/psych because he is bored.  aide reports pt can return to  if pt is cleared for discharge.

## 2024-02-22 NOTE — ED ADULT NURSE NOTE - OBJECTIVE STATEMENT
coming group home with flu like symptoms headache , dizziness nausea diarrhea, denies pain and all other symptoms

## 2024-02-22 NOTE — ED BEHAVIORAL HEALTH ASSESSMENT NOTE - NSBHMSESPEECH_PSY_A_CORE
no disorganization/Normal volume, rate, productivity, spontaneity and articulation Normal volume, rate, productivity, spontaneity and articulation

## 2024-02-22 NOTE — BH SAFETY PLAN - LOCAL URGENT CARE ADDRESS
[facility] (M-F 9a-7p)  [phone]  [phone] [place] [id number]

## 2024-02-22 NOTE — ED BEHAVIORAL HEALTH ASSESSMENT NOTE - NSBHPSYCHOLCOGABN_PSY_A_CORE
think the month is January and that we are at [facility] (not [facility])/disoriented to time/disoriented to place

## 2024-02-22 NOTE — ED BEHAVIORAL HEALTH ASSESSMENT NOTE - VIOLENCE PROTECTIVE FACTORS:
Residential stability/Sobriety/Engagement in treatment/Insight into violence risk and need for management/treatment/Good treatment response/compliance/Other Residential stability/Sobriety/Engagement in treatment/Insight into violence risk and need for management/treatment/Good treatment response/compliance
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
## 2024-02-22 NOTE — ED BEHAVIORAL HEALTH ASSESSMENT NOTE - NSBHROSSYSTEMS_PSY_ALL_CORE
Pt currently denies experiencing any headaches; has no dizziness, no blurring of vision; no cough/ colds, no sore throat; no fever. not complaining of any chest pains, no SOB, no palpitations; no abdominal/ flank pains, no nausea/ vomiting or diarrhea/ constipation; no dysuria, no hesitancy, no polyuria, no hematuria; no pruritus/ no rashes nor any edema. denied any muscle/ joint pains/Psychiatric

## 2024-02-22 NOTE — ED BEHAVIORAL HEALTH ASSESSMENT NOTE - SAFETY PLAN ADDT'L DETAILS
Safety plan discussed with.../Education provided regarding environmental safety / lethal means restriction/Provision of National Suicide Prevention Lifeline 3-150-018-DKVA (7771)

## 2024-02-22 NOTE — ED BEHAVIORAL HEALTH ASSESSMENT NOTE - NS ED BHA PLAN
Normal vision: sees adequately in most situations; can see medication labels, newsprint
Treat and Release
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
## 2024-02-22 NOTE — ED BEHAVIORAL HEALTH ASSESSMENT NOTE - CURRENT MEDICATION
Home Medications:  acetaminophen 325 mg oral tablet: 2 tab(s) orally every 6 hours As needed Temp greater or equal to 38C (100.4F), Mild Pain (1 - 3) (30 Jan 2024 14:36)  atorvastatin 10 mg oral tablet: 1 tab(s) orally once a day (in the evening) (30 Jan 2024 09:17)  busPIRone 15 mg oral tablet: 1 tab(s) orally once a day (in the evening) (30 Jan 2024 09:17)  divalproex sodium 500 mg oral tablet, extended release: 1 tab(s) orally 2 times a day at 7 AM and 7 PM (30 Jan 2024 09:17)  docusate sodium 100 mg oral tablet: 1 tab(s) orally 3 times a day (30 Jan 2024 09:16)  guanFACINE 2 mg oral tablet: 1 tab(s) orally once a day (in the morning) (30 Jan 2024 09:17)  mirtazapine 15 mg oral tablet: 1.5 tab(s) orally once a day (at bedtime) (30 Jan 2024 09:17)  pantoprazole 20 mg oral delayed release tablet: 1 tab(s) orally once a day (30 Jan 2024 09:17)  risperiDONE 4 mg oral tablet: 1 tab(s) orally 2 times a day at 7 AM and 7 PM (30 Jan 2024 09:17) As per hpi

## 2024-02-22 NOTE — ED BEHAVIORAL HEALTH ASSESSMENT NOTE - OTHER
[facility] group home ([facility]) not internally preoccupied, rocking back and forth continuously lives in supervised setting; connected to care limited (chronic) peers  staff member

## 2024-02-22 NOTE — ED ADULT NURSE NOTE - NSFALLUNIVINTERV_ED_ALL_ED
Bed/Stretcher in lowest position, wheels locked, appropriate side rails in place/Call bell, personal items and telephone in reach/Instruct patient to call for assistance before getting out of bed/chair/stretcher/Non-slip footwear applied when patient is off stretcher/[place] to call system/Physically safe environment - no spills, clutter or unnecessary equipment/Purposeful proactive rounding/Room/bathroom lighting operational, light cord in reach
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
## 2024-02-22 NOTE — ED BEHAVIORAL HEALTH ASSESSMENT NOTE - NSSUICPROTFACT_PSY_ALL_CORE
Supportive social network of family or friends/Positive therapeutic relationships/Other Responsibility to children, family, or others/Identifies reasons for living/Supportive social network of family or friends/Positive therapeutic relationships

## 2024-02-22 NOTE — ED BEHAVIORAL HEALTH ASSESSMENT NOTE - NSACTIVEVENT_PSY_ALL_CORE
Current or pending social isolation/Chronic pain or other acute medical condition Triggering events leading to humiliation, shame, and/or despair (e.g., Loss of relationship, financial or health status) (real or anticipated)/Current or pending social isolation/Chronic pain or other acute medical condition
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
## 2024-02-23 LAB
CULTURE RESULTS: SIGNIFICANT CHANGE UP
SPECIMEN SOURCE: SIGNIFICANT CHANGE UP

## 2024-02-26 ENCOUNTER — EMERGENCY (EMERGENCY)
Facility: HOSPITAL | Age: 34
LOS: 1 days | Discharge: HOME | End: 2024-02-26
Attending: STUDENT IN AN ORGANIZED HEALTH CARE EDUCATION/TRAINING PROGRAM | Admitting: STUDENT IN AN ORGANIZED HEALTH CARE EDUCATION/TRAINING PROGRAM
Payer: MEDICAID

## 2024-02-26 VITALS
HEIGHT: 71 IN | OXYGEN SATURATION: 97 % | DIASTOLIC BLOOD PRESSURE: 67 MMHG | SYSTOLIC BLOOD PRESSURE: 133 MMHG | TEMPERATURE: 98 F | HEART RATE: 103 BPM | RESPIRATION RATE: 18 BRPM

## 2024-02-26 DIAGNOSIS — Z90.79 ACQUIRED ABSENCE OF OTHER GENITAL ORGAN(S): Chronic | ICD-10-CM

## 2024-02-26 LAB
ANION GAP SERPL CALC-SCNC: 13 MMOL/L — SIGNIFICANT CHANGE UP (ref 7–14)
APAP SERPL-MCNC: <10 UG/ML — LOW (ref 15–25)
APPEARANCE UR: CLEAR — SIGNIFICANT CHANGE UP
BACTERIA # UR AUTO: NEGATIVE /HPF — SIGNIFICANT CHANGE UP
BASOPHILS # BLD AUTO: 0.03 K/UL — SIGNIFICANT CHANGE UP (ref 0–0.2)
BASOPHILS NFR BLD AUTO: 0.4 % — SIGNIFICANT CHANGE UP (ref 0–2)
BILIRUB UR-MCNC: NEGATIVE — SIGNIFICANT CHANGE UP
BUN SERPL-MCNC: 14 MG/DL — SIGNIFICANT CHANGE UP (ref 7–23)
CALCIUM SERPL-MCNC: 9.2 MG/DL — SIGNIFICANT CHANGE UP (ref 8.4–10.5)
CAST: 0 /LPF — SIGNIFICANT CHANGE UP (ref 0–4)
CHLORIDE SERPL-SCNC: 103 MMOL/L — SIGNIFICANT CHANGE UP (ref 98–107)
CO2 SERPL-SCNC: 22 MMOL/L — SIGNIFICANT CHANGE UP (ref 22–31)
COLOR SPEC: YELLOW — SIGNIFICANT CHANGE UP
CREAT SERPL-MCNC: 0.7 MG/DL — SIGNIFICANT CHANGE UP (ref 0.5–1.3)
DIFF PNL FLD: NEGATIVE — SIGNIFICANT CHANGE UP
EGFR: 124 ML/MIN/1.73M2 — SIGNIFICANT CHANGE UP
EOSINOPHIL # BLD AUTO: 0.37 K/UL — SIGNIFICANT CHANGE UP (ref 0–0.5)
EOSINOPHIL NFR BLD AUTO: 5.4 % — SIGNIFICANT CHANGE UP (ref 0–6)
ETHANOL SERPL-MCNC: <10 MG/DL — SIGNIFICANT CHANGE UP
GLUCOSE SERPL-MCNC: 112 MG/DL — HIGH (ref 70–99)
GLUCOSE UR QL: NEGATIVE MG/DL — SIGNIFICANT CHANGE UP
HCT VFR BLD CALC: 39.1 % — SIGNIFICANT CHANGE UP (ref 39–50)
HGB BLD-MCNC: 12.4 G/DL — LOW (ref 13–17)
IANC: 3.32 K/UL — SIGNIFICANT CHANGE UP (ref 1.8–7.4)
IMM GRANULOCYTES NFR BLD AUTO: 0.7 % — SIGNIFICANT CHANGE UP (ref 0–0.9)
KETONES UR-MCNC: ABNORMAL MG/DL
LEUKOCYTE ESTERASE UR-ACNC: NEGATIVE — SIGNIFICANT CHANGE UP
LYMPHOCYTES # BLD AUTO: 2.63 K/UL — SIGNIFICANT CHANGE UP (ref 1–3.3)
LYMPHOCYTES # BLD AUTO: 38.5 % — SIGNIFICANT CHANGE UP (ref 13–44)
MCHC RBC-ENTMCNC: 24.8 PG — LOW (ref 27–34)
MCHC RBC-ENTMCNC: 31.7 GM/DL — LOW (ref 32–36)
MCV RBC AUTO: 78.4 FL — LOW (ref 80–100)
MONOCYTES # BLD AUTO: 0.44 K/UL — SIGNIFICANT CHANGE UP (ref 0–0.9)
MONOCYTES NFR BLD AUTO: 6.4 % — SIGNIFICANT CHANGE UP (ref 2–14)
NEUTROPHILS # BLD AUTO: 3.32 K/UL — SIGNIFICANT CHANGE UP (ref 1.8–7.4)
NEUTROPHILS NFR BLD AUTO: 48.6 % — SIGNIFICANT CHANGE UP (ref 43–77)
NITRITE UR-MCNC: NEGATIVE — SIGNIFICANT CHANGE UP
NRBC # BLD: 0 /100 WBCS — SIGNIFICANT CHANGE UP (ref 0–0)
NRBC # FLD: 0 K/UL — SIGNIFICANT CHANGE UP (ref 0–0)
PH UR: 6.5 — SIGNIFICANT CHANGE UP (ref 5–8)
PLATELET # BLD AUTO: 215 K/UL — SIGNIFICANT CHANGE UP (ref 150–400)
POTASSIUM SERPL-MCNC: 4.2 MMOL/L — SIGNIFICANT CHANGE UP (ref 3.5–5.3)
POTASSIUM SERPL-SCNC: 4.2 MMOL/L — SIGNIFICANT CHANGE UP (ref 3.5–5.3)
PROT UR-MCNC: NEGATIVE MG/DL — SIGNIFICANT CHANGE UP
RBC # BLD: 4.99 M/UL — SIGNIFICANT CHANGE UP (ref 4.2–5.8)
RBC # FLD: 14.5 % — SIGNIFICANT CHANGE UP (ref 10.3–14.5)
RBC CASTS # UR COMP ASSIST: 0 /HPF — SIGNIFICANT CHANGE UP (ref 0–4)
SALICYLATES SERPL-MCNC: <0.3 MG/DL — LOW (ref 15–30)
SODIUM SERPL-SCNC: 138 MMOL/L — SIGNIFICANT CHANGE UP (ref 135–145)
SP GR SPEC: 1.02 — SIGNIFICANT CHANGE UP (ref 1–1.03)
SQUAMOUS # UR AUTO: 1 /HPF — SIGNIFICANT CHANGE UP (ref 0–5)
UROBILINOGEN FLD QL: 1 MG/DL — SIGNIFICANT CHANGE UP (ref 0.2–1)
WBC # BLD: 6.84 K/UL — SIGNIFICANT CHANGE UP (ref 3.8–10.5)
WBC # FLD AUTO: 6.84 K/UL — SIGNIFICANT CHANGE UP (ref 3.8–10.5)
WBC UR QL: 0 /HPF — SIGNIFICANT CHANGE UP (ref 0–5)

## 2024-02-26 PROCEDURE — 99285 EMERGENCY DEPT VISIT HI MDM: CPT

## 2024-02-26 RX ORDER — SODIUM CHLORIDE 9 MG/ML
2000 INJECTION INTRAMUSCULAR; INTRAVENOUS; SUBCUTANEOUS ONCE
Refills: 0 | Status: COMPLETED | OUTPATIENT
Start: 2024-02-26 | End: 2024-02-26

## 2024-02-26 NOTE — ED ADULT NURSE REASSESSMENT NOTE - NS ED NURSE REASSESS COMMENT FT1
Patient A&Ox4 and ambulatory, breathing even and unlabored on room air. Sarabia catheter placed as per MD order and labs drawn and sent. Patient to be placed on constant observation for suicidal ideation without a plan. Denies ideations at this time. Belongings secured and placed across from room 21. Valuables secured with security. Staff from group home at bedside. Pt denies any complaints at this time.

## 2024-02-26 NOTE — ED PROVIDER NOTE - NSFOLLOWUPINSTRUCTIONS_ED_ALL_ED_FT
You were seen in the Emergency Department for urinary retention and thoughts of harming/killing yourself. You underwent blood work which was unremarkable. You also were able to urinate while here, including after the mckeon catheter was taken out. You were asked if you were still having thoughts of harming yourself and you denied. You were seen by our psych department who cleared you for discharge.    1) Continue all previously prescribed medications as directed.    2) Follow up with your primary care physician - take copies of your results.    3) Return to the Emergency Department for worsening or persistent symptoms, and/or ANY NEW OR CONCERNING SYMPTOMS.

## 2024-02-26 NOTE — ED PROVIDER NOTE - CLINICAL SUMMARY MEDICAL DECISION MAKING FREE TEXT BOX
33-year-old male with past medical history of bipolar disorder, presenting after reporting that he wanted to harm himself, having passive suicidal ideation, and urinary retention.     VS show HTN and tachycardia. Otherwise WNL. PE shows pt breathing comfortably on RA. Lungs CTA. Cardiac auscultation shows no murmurs rubs of gallops. Excoriation on R forearm. Appears to be in no distress. Mild suprapubic TTP. No CVA ttp. Will assess for UTI. Plan: psych clearance labs, UA/UC, psych consult

## 2024-02-26 NOTE — ED ADULT NURSE NOTE - OBJECTIVE STATEMENT
Received this 33 year old male from a group home, care staff at bedside. alert and oriented x 4. Complaining of suprapubic pain, due to urinary retention. last void was yesterday at 1800 hrs as per patient. On palpation suprapubic area is soft,, patient appeared uncomfortable, bowel sounds x 4.  Chest is clear bilaterally, , Patient showed RN his skin on bilateral lower arms, he peeled off his skin with a knife, with intention of harming self. No other injuries noted. Good eye contact and cooperative. States history of cancer," both testicles removed long time ago", bipolar, thyroid problems.

## 2024-02-26 NOTE — ED PROVIDER NOTE - PATIENT PORTAL LINK FT
You can access the FollowMyHealth Patient Portal offered by [facility] by registering at the following website: [email or web address]. By joining [employer]’s FollowMyHealth portal, you will also be able to view your health information using other applications (apps) compatible with our system.

## 2024-02-26 NOTE — ED PROVIDER NOTE - PHYSICAL EXAMINATION
GENERAL: NAD, lying in bed comfortably  HEAD:  Atraumatic, Normocephalic  EYES: EOMI, conjunctiva and sclera clear  ENT: Moist mucous membranes  NECK: Supple  CHEST/LUNG: Unlabored respirations. Clear to auscultation bilaterally. No rales, rhonchi, wheezing, or rubs  HEART: Regular rate and rhythm. No murmurs, rubs, or gallops  ABDOMEN: Soft, nondistended. Mild ttp in lower abdomen  EXTREMITIES:  No cyanosis or edema. Brisk capillary refill  NERVOUS SYSTEM:  No focal deficits. A&Ox3  SKIN: excoriation on L forearm

## 2024-02-26 NOTE — ED PROVIDER NOTE - ATTENDING CONTRIBUTION TO CARE
Robbie Liz DO:  patient seen and evaluated with the resident.  I was present for key portions of the History & Physical, and I agree with the Impression & Plan. 32 yo m pmh asthma, GERD, hyperlipidemia, obesity, hypothyroidism, BPH, bipolar, pw urinary retention. Patient reports he has been unable to pee for 1 day.  Denies recent dysuria, back pain, N/B,/V, flank pain.  Reports that when this happens he requires Sarabia.  Patient also endorsing SI, HI, with harm himself and others with a knife.  States this is because of his chronic bladder issues after having testicular cancer.  Patient is HDS well-appearing, neurovasc intact, PE as noted.  Will place Sarabia, check for elevated creatinine, UTI.  Will also require psych consult for SI. Robbie Liz DO:  patient seen and evaluated with the resident.  I was present for key portions of the History & Physical, and I agree with the Impression & Plan. 32 yo m pmh asthma, GERD, hyperlipidemia, obesity, hypothyroidism, BPH, bipolar, pw urinary retention. Patient reports he has been unable to pee for 1 day.  Denies recent dysuria, back pain, N/B,/V, flank pain.  Reports that when this happens he requires Sarabia.  Patient also endorsing SI, HI, with harm himself and others with a knife.  States this is because of his chronic bladder issues after having testicular cancer.  Patient is HDS well-appearing, neurovasc intact, PE as noted.  Will place Sarabia, check for elevated creatinine, UTI.  Will also require psych consult for SI..

## 2024-02-26 NOTE — ED ADULT TRIAGE NOTE - SOURCE OF INFORMATION
Subjective:      Patient ID: Lia Burden is a 57 y.o. female    HPI  57year old female with a known history of superficial bladder cancer s/p BCG therapy in . The patient presents to schedule a follow up appointment for cystoscopy. She denies hematuria or any other urological complaints. Past Medical History:   Diagnosis Date    Anemia     she sees hematology    Anxiety     Back pain with right-sided radiculopathy     Bladder CA in situ     Chronic left shoulder pain     Depression     Fibromyalgia     Hepatitis     Hyperlipidemia     Hypertension     Osteoarthritis     SHOULDER    Restless legs syndrome     Type II or unspecified type diabetes mellitus without mention of complication, not stated as uncontrolled      Past Surgical History:   Procedure Laterality Date    APPENDECTOMY  2022    [address] -     BLADDER SURGERY Right 2022    CYSTOSCOPY RIGHT  RETROGRADE PYELOGRAM RIGHT DOUBLE J STENT performed by Cristo Powers MD at [address] [place] [address] Right 2022    TRANSURETHRAL RESECTION OF  BLADDER TUMOR G.E.T. WITH MUSCLE RELAXATION performed by Crsito Powers MD at [facility] ([address] [place] [address])      [address] [place] [address]  2011    ROTATOR CUFF REPAIR Left 10/2021    TONSILLECTOMY AND ADENOIDECTOMY       Social History     Socioeconomic History    Marital status:      Spouse name: None    Number of children: None    Years of education: None    Highest education level: None   Tobacco Use    Smoking status: Former     Packs/day: 0.05     Years: 20.00     Pack years: 1.00     Types: Cigarettes     Quit date: 11/3/2018     Years since quittin.7    Smokeless tobacco: Never   Substance and Sexual Activity    Alcohol use:  Yes     Alcohol/week: 0.0 standard drinks     Comment: social    Drug use: No     Social Determinants of Health     Financial Resource Strain: Low Risk     Difficulty of Paying Living Expenses: Not hard at all
Patient/EMS
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
## 2024-02-26 NOTE — ED ADULT TRIAGE NOTE - MEANS OF ARRIVAL
Daily Note     Today's date: 1/15/2019  Patient name: Edmond Mcgee  : 1937  MRN: [id number]  Referring provider: Maranda Dakin, MD  Dx:   Encounter Diagnosis     ICD-10-CM    1  Left shoulder pain, unspecified chronicity M25 [id number]                   Subjective: Pt states that she is stiff and sore today  Objective: See treatment diary below  Precautions: recent dislocation, blood thinner, fall risk, hx of stroke, cardiovascular disease, DM2      Daily Treatment Diary      Manual  12/6 12/10  12/13  12/18  12/20  12/27  12/31  1/3  1/8 1/10 1/15   Shoulder PROM Lk [address]   rhythmic stabilization Anell [address]   SASKAI                                                                               Exercise Diary  12/6 12/10  12/13  12/18  12/20  12/27  12/31  1/3 1/8 1/10 1/15   Pulley 6' 6'  6'  6'  6'  5'  6'  6' 6' 6' 6'   Shoulder isometrcis 2x10  10" [id number]  2x10x5"    2x10x5"  2x10x5"  2x10x5"   2x10x5" 2x10x5" 2x10x5"   AAROM with cane 2x10 2x10  np-time  2x10 each  2x10 each  2x10 each  2x10 each  2x10 10 2x10 2x10   serratus punches with cane 2x10 2x10  np time  10  10  10      2x10 2x10   t-band rows OTB  x10 OTB 2x10  OTB 2x10  OTB 2x10  OTB 2x10  OTB 2x10  OTB 2x15  OTB OTB 2x10 OTB 2x10 OTB 2x10   table slides 2x10   ea 2x10  2x10 each  2x10 each  2x10 each  2x10 each     2x10 each 2x10     serratus ABC's        1x        1x       press with cane       10      2x10  2x10 10 2x10 2x10                                                                                                                                                                                                                     Modalities                        cold pack 5'                                                                               Assessment: Tolerated treatment well  Patient demonstrated fatigue post treatment and would benefit from continued PT    Pt demonstrated fatigue throughout TE today  Reported pain in her arm that remained unchanged throughout the session  Plan: Continue per plan of care  ambulatory

## 2024-02-26 NOTE — ED PROVIDER NOTE - PROGRESS NOTE DETAILS
Carlos PGY2: Patient passed TOV and bladder scan confirmed no longer retaining. Denies any current thoughts of suicide, self harm, or homicidal ideation. Psych consulted on case and will come evaluate patient. Carlos PGY2: Patient cleared by psych for discharge. Pt was reassessed and is doing well. Results, including any incidental findings, were discussed. Follow up and return precautions were discussed. Patient verbalized understanding Charlie, Attending  Delayed note. Patient signed out to me. 32 y/o with bipolar, factitious disorder, testicular cancer here for SI and decreased urine output, initially had 800cc; mckeon placed and removed mckeon at 6am and patient passed TOV confirmed with POCUS; medically cleared. Psych consulted and saw patient who cleared patient for discharge. No SI at this time on reassessment. SW to see patient for transport back to [facility] Robbie Liz, DO: collateral obtained from group home. Patient has history of going to multiple emergency departments, has gone to several in the last month.  Goes there to leave group home.  Also reports that he intentionally refrains from pain in order to obtain a Sarabia so that he can be admitted.  Patient reported that he is last urinated the day prior however staff states they witnessed him urinate that same day.  Will remove Sarabia and do trial to void.  If able to void, psych consult and reassess.

## 2024-02-26 NOTE — ED ADULT TRIAGE NOTE - CHIEF COMPLAINT QUOTE
Called EMS with a plan to kill himself with a knife, hearing voices telling him to hurt himself and others. Reports inability to urinate and abdominal pain. Phx DM, testicular cancer, impulse control disorder, BPH

## 2024-02-27 ENCOUNTER — EMERGENCY (EMERGENCY)
Facility: HOSPITAL | Age: 34
LOS: 1 days | Discharge: HOME | End: 2024-02-27
Attending: EMERGENCY MEDICINE | Admitting: EMERGENCY MEDICINE
Payer: MEDICAID

## 2024-02-27 VITALS
OXYGEN SATURATION: 98 % | SYSTOLIC BLOOD PRESSURE: 131 MMHG | RESPIRATION RATE: 18 BRPM | TEMPERATURE: 98 F | HEART RATE: 78 BPM | DIASTOLIC BLOOD PRESSURE: 89 MMHG

## 2024-02-27 VITALS
OXYGEN SATURATION: 98 % | TEMPERATURE: 98 F | HEIGHT: 71 IN | SYSTOLIC BLOOD PRESSURE: 116 MMHG | DIASTOLIC BLOOD PRESSURE: 83 MMHG | RESPIRATION RATE: 18 BRPM | HEART RATE: 96 BPM

## 2024-02-27 DIAGNOSIS — F84.0 AUTISTIC DISORDER: ICD-10-CM

## 2024-02-27 DIAGNOSIS — Z90.79 ACQUIRED ABSENCE OF OTHER GENITAL ORGAN(S): Chronic | ICD-10-CM

## 2024-02-27 DIAGNOSIS — F79 UNSPECIFIED INTELLECTUAL DISABILITIES: ICD-10-CM

## 2024-02-27 LAB
AMPHET UR-MCNC: NEGATIVE — SIGNIFICANT CHANGE UP
BARBITURATES UR SCN-MCNC: NEGATIVE — SIGNIFICANT CHANGE UP
BENZODIAZ UR-MCNC: NEGATIVE — SIGNIFICANT CHANGE UP
COCAINE METAB.OTHER UR-MCNC: NEGATIVE — SIGNIFICANT CHANGE UP
CREATININE URINE RESULT, DAU: 52 MG/DL — SIGNIFICANT CHANGE UP
METHADONE UR-MCNC: NEGATIVE — SIGNIFICANT CHANGE UP
OPIATES UR-MCNC: NEGATIVE — SIGNIFICANT CHANGE UP
OXYCODONE UR-MCNC: NEGATIVE — SIGNIFICANT CHANGE UP
PCP SPEC-MCNC: SIGNIFICANT CHANGE UP
PCP UR-MCNC: NEGATIVE — SIGNIFICANT CHANGE UP
THC UR QL: NEGATIVE — SIGNIFICANT CHANGE UP

## 2024-02-27 PROCEDURE — 99284 EMERGENCY DEPT VISIT MOD MDM: CPT

## 2024-02-27 PROCEDURE — 93010 ELECTROCARDIOGRAM REPORT: CPT

## 2024-02-27 RX ADMIN — SODIUM CHLORIDE 1000 MILLILITER(S): 9 INJECTION INTRAMUSCULAR; INTRAVENOUS; SUBCUTANEOUS at 03:22

## 2024-02-27 NOTE — ED BEHAVIORAL HEALTH ASSESSMENT NOTE - VIOLENCE RISK FACTORS:
Violent ideation/threat/speech/Impulsivity/History of being victimized/traumatized/Noncompliance with treatment Impulsivity/Noncompliance with treatment
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
## 2024-02-27 NOTE — ED ADULT NURSE REASSESSMENT NOTE - NS ED NURSE REASSESS COMMENT FT1
Sarabia catheter removed as per MD orders. Pt tolerated well. PCA remains at bedside for 1:1 observation. Safety measures in place, awaiting dispo status Statement Selected

## 2024-02-27 NOTE — ED BEHAVIORAL HEALTH ASSESSMENT NOTE - NSBHROSSYSTEMS_PSY_ALL_CORE
Think this was already taken care of right?   Psychiatric + superficial laceration over left forearm (distal 3rd). no active bleeding currently/Psychiatric

## 2024-02-27 NOTE — ED BEHAVIORAL HEALTH ASSESSMENT NOTE - DIFFERENTIAL
ID, ASD, impulse control disorder, factitious disorder Intellectual disability, Autism Spectrum Disorder, impulse control disorder, factitious disorder
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
## 2024-02-27 NOTE — ED BEHAVIORAL HEALTH ASSESSMENT NOTE - ADDITIONAL DETAILS ALL
patient with hx of reported self cutting on 2/22/24 as per chart review ; denies self harm since then. with hx of reported self cutting on 2/22/24 as per chart review ; tonight, had engaged in cutting left forearm using a plastic knife
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
## 2024-02-27 NOTE — ED BEHAVIORAL HEALTH ASSESSMENT NOTE - DESCRIPTION
as per HPI DM, HLD, BPH, hypothyroidism, GERD See BH note During course of ED visit patient was calm and cooperative. Patient was not aggressive or violent and did not require PRN medications.    ICU Vital Signs Last 24 Hrs  T(C): 36.7 (27 Feb 2024 06:05), Max: 36.9 (26 Feb 2024 17:26)  T(F): 98.1 (27 Feb 2024 06:05), Max: 98.5 (26 Feb 2024 17:26)  HR: 81 (27 Feb 2024 06:05) (73 - 103)  BP: 120/80 (27 Feb 2024 06:05) (120/80 - 147/75)  BP(mean): --  ABP: --  ABP(mean): --  RR: 18 (27 Feb 2024 06:05) (16 - 18)  SpO2: 100% (27 Feb 2024 06:05) (97% - 100%)    O2 Parameters below as of 27 Feb 2024 06:05  Patient On (Oxygen Delivery Method): room air

## 2024-02-27 NOTE — ED BEHAVIORAL HEALTH ASSESSMENT NOTE - MEDICAL RECORD REVIEWED
[Normal Growth] : growth [Normal Development] : development  [No Elimination Concerns] : elimination [Continue Regimen] : feeding [No Skin Concerns] : skin [Normal Sleep Pattern] : sleep [None] : no medical problems [Anticipatory Guidance Given] : Anticipatory guidance addressed as per the history of present illness section [Physical Growth and Development] : physical growth and development [Social and Academic Competence] : social and academic competence [Emotional Well-Being] : emotional well-being [Risk Reduction] : risk reduction [Violence and Injury Prevention] : violence and injury prevention [No Vaccines] : no vaccines needed [No Medications] : ~He/She~ is not on any medications [Patient] : patient [Parent/Guardian] : Parent/Guardian [Full Activity without restrictions including Physical Education & Athletics] : Full Activity without restrictions including Physical Education & Athletics [] : The components of the vaccine(s) to be administered today are listed in the plan of care. The disease(s) for which the vaccine(s) are intended to prevent and the risks have been discussed with the caretaker.  The risks are also included in the appropriate vaccination information statements which have been provided to the patient's caregiver.  The caregiver has given consent to vaccinate. [FreeTextEntry1] : Continue balanced diet with all food groups. Brush teeth twice a day with toothbrush. Recommend visit to dentist. Help child to maintain consistent daily routines and sleep schedule. School discussed. Ensure home is safe. Teach child about personal safety. Use consistent, positive discipline. Limit screen time to no more than 2 hours per day. Encourage physical activity. Child needs to ride in a belt-positioning booster seat until  4 feet 9 inches has been reached and are between 8 and 12 years of age. \par \par Return 1 year for routine well child check.\par Cardiac questionnaire reviewed, NO issues.\par 5-2-1-0 questionnaire reviewed,  concerns discussed\par Discussed in the preferred language of English\par Diet and exercise discussed\par Obesity labs Yes

## 2024-02-27 NOTE — ED PROVIDER NOTE - PROGRESS NOTE DETAILS
LUIS ENRIQUE: Pt was signed out to me pending reassessment in the morning, likely discharge if cleared by psych. No complaints, will continue to monitor pending final recs. SAUNDRA Valadez: Patient signed out to me. Pending pickup by Group home staff. Patient calm and cooperative. No complaints at this time.

## 2024-02-27 NOTE — ED ADULT NURSE REASSESSMENT NOTE - NS ED NURSE REASSESS COMMENT FT1
BREAK RN: Pt at baseline mental status, resting comfortably in stretcher. RR even and unlabored, NAD noted. Sarabia remains draining clear yellow urine. PCA and group home staff at bedside for 1:1 observation. Safety measures in place

## 2024-02-27 NOTE — ED BEHAVIORAL HEALTH ASSESSMENT NOTE - NSCURPASTPSYDX_PSY_ALL_CORE
Intellectual Disability; ASD/Mood disorder/ADHD/Conduct problems Intellectual Disability; hx of autism spectrum disorder/Mood disorder/ADHD/Conduct problems has Intellectual Disability; hx of autism spectrum disorder/Mood disorder/ADHD/Conduct problems

## 2024-02-27 NOTE — BH SAFETY PLAN - ENVIRONMENT SAFETY 1:
come to the ED  [place] Pregnancy And Lactation Text: This medication is not safe during pregnancy. It is unknown if this medication is excreted in breast milk.
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
## 2024-02-27 NOTE — ED ADULT NURSE REASSESSMENT NOTE - NS ED NURSE REASSESS COMMENT FT1
Received patient in 10B. Patient resting in stretcher, no distress noted. Patient denies any pain/discomfort at this time. Patient is on 1:1 observation as per order, PCA at bedside for observation. Patient is A&OX4, airway patent, breathing unlabored and even, radial pulses palpable. Patient is calm and cooperative at this time. Side rails up and safety maintained.

## 2024-02-27 NOTE — ED PROVIDER NOTE - NSFOLLOWUPINSTRUCTIONS_ED_ALL_ED_FT
Follow up with your PMD within 48-72 hrs. Show copies of your reports given to you.   Worsening, continued or new concerning symptoms return to the emergency department.    You have been given information necessary to follow up with the  [facility] ([facility]) Crisis center & other outpatient  psychiatric clinics within your community    • [facility] walk in Crisis centre  [address] [place] [address]  [phone] [email or web address]  Hours of operation:  Day	                                        Hours  Sunday                                  Closed  Monday                                9am - 3pm  Tuesday                                9am - 3pm  Wednesday                          9am - 3pm  Thursday                               9am - 3pm  Friday                                    9am - 3pm  Saturday                                Closed

## 2024-02-27 NOTE — ED BEHAVIORAL HEALTH ASSESSMENT NOTE - NSBHMSEBEHAV_PSY_A_CORE
Cooperative not guarded, not suspicious; overtly enthusiastic to be here (at the ED)/Cooperative childish, not guarded, not suspicious; overtly enthusiastic to be here (at the ED)/Cooperative
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
## 2024-02-27 NOTE — ED ADULT NURSE REASSESSMENT NOTE - NS ED NURSE REASSESS COMMENT FT1
Patient A&Ox4 and ambulatory, resting comfortably in stretcher. Breathing even and unlabored on room air. Sarabia catheter in place with good drainage. Patient denies acute complaints at this time. Pending psych eval.
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
## 2024-02-27 NOTE — ED BEHAVIORAL HEALTH ASSESSMENT NOTE - NS ED BHA PLAN TR BH CONTACTED FT
After hours.  staff will inform treatment team left VM for provider and advised group home staff to ensure follow up for patient.

## 2024-02-27 NOTE — ED BEHAVIORAL HEALTH ASSESSMENT NOTE - PAST PSYCHOTROPIC MEDICATION
Zyprexa,  Haloperidol, Clonidine, Topamax, Klonopin, Seroquel, Thorazine, Abilify, Trazodone, Gabapentin, Tegetrol, Trileptal, Perphenazine, Prozac, Lithium, Zoloft and Hydroxyzine. as per chart review - Zyprexa,  Haloperidol, Clonidine, Topamax, Klonopin, Seroquel, Thorazine, Abilify, Trazodone, Gabapentin, Tegetrol, Trileptal, Perphenazine, Prozac, Lithium, Zoloft and Hydroxyzine.

## 2024-02-27 NOTE — ED BEHAVIORAL HEALTH ASSESSMENT NOTE - SAFETY PLAN ADDT'L DETAILS
Safety plan discussed with.../Education provided regarding environmental safety / lethal means restriction/Provision of National Suicide Prevention Lifeline 8-537-179-NURQ (8270)

## 2024-02-27 NOTE — ED BEHAVIORAL HEALTH ASSESSMENT NOTE - RISK ASSESSMENT
See above Given the pt's history, he is at a chronically elevated risk of harm due to his static risk factors(prior psych history, chronic medical conditions, hx of SIB and aggression) and modifiable risk factors(impulsivity, poor coping skills). However, his risk is mitigated by the fact he currently denies SI or urges to harm himself, presents as future-oriented, is able to safety plan, help seeking, has support from family and  staff, is on 1:1 at his , potentially dangerous items are secured in his residence, he denies access to firearms, has no known suicide attempts, has remained in good control in the ED, and currently does not report or manifest symptoms of acute psychosis, everett, or depression. In light of these mitigating factors, patient does not appear to be at imminent risk for harm to self / others and does not meet criteria for involuntary admission at this time.

## 2024-02-27 NOTE — ED BEHAVIORAL HEALTH ASSESSMENT NOTE - HPI (INCLUDE ILLNESS QUALITY, SEVERITY, DURATION, TIMING, CONTEXT, MODIFYING FACTORS, ASSOCIATED SIGNS AND SYMPTOMS)
33 yr old male, single (claiming to be  as he used to be ), domiciled and unemployed (though claiming to "work for the group home").  background hx of autism complicated by moderate Intellectual disability; other diagnoses includes impulse control disorder, HAVEN, mood disorders NOS, PTSD, ADHD and factitious disorder. with past psych admissions (including last [facility]: 7/2023); high utilizer of ED/ Psych ED services involving both medical/psych complaints; seen yesterday due to abdominal pain with difficulty urinating. this morning,  ED service conducted evaluation for this Pt due to SI, "hearing voices telling him to hurt himself and others".   both these encounters led to Pt being discharged.  Pt last evaluated by this writer back in 2/6/2024 due to SI and cAH.  Pt has no documented hx of SA but does engage in series of NSSIB via cutting or head banging. has longstanding hx of endorsing SI (situational: mostly stemming from discontent of living environment).  there is hx of aggression toward group home staff.  pertinent medical issues include: asthma, DM, BPH, urinary retention, GERD, hypothyroidism, HLD, HTN, and myopia.  2nd ED presentation tonight (within the day) due to SI, engaged in self injurious behavior via cutting left forearm using a plastic knife in the context of "multiple psychosocial stressors: predominantly, resentment from his ex-fiance whom he claimed seeing her kissing another man; conflictual relationships with co residents + group home staff.  Pt also claims having "cAH: telling him to hurt self".      On evaluation, patient is alert, calm and cooperative, euthymic (but claims feeling down - does not appear dysphoric), concrete.  Reports that today, he was feeling down due to multiple psychosocial stressors - he decided to get a plastic knife in the kitchen. then started to initially cut superficially, later on, cutting more - leading to bleeding (NOT PROFUSE though)    he activated 911 prior to presenting to the ED due to having thoughts of hurting himself by using a knife.  Reports that he did not have thoughts of killing himself but cutting himself with a knife to "feel better." Denies intent or plan of hurting himself and wanted help and that's was the reason he called 911.  Denies engaging in self harming behavior prior to arrival to the ED. Denies suicidal / self harming ideation, intent or plan at time of evaluation, as well.  Reports losing his job recently and break up with dannielle 2 years ago whom he still misses as possible stressors.   Reports spending time in the hospital was helpful to him and that at present time he is no longer having thoughts of hurting himself or ending his life.  Reports having heard voices telling him to hurt himself by "doing something," most recently last night.  Denies hearing voices at time of evaluation and is not able to provide details around the auditory hallucinations.  He denies thoughts of hurting others at this time or that voices were telling him to hurt others.  Reports experiencing voices and self harming / SI for multiple years and denies increase in frequency, intensity or inability to distract self from them.  Reports that going for a walk, listening to music, boxing are helpful in distracting from these thoughts / voices.  Reports if thoughts / voices were to return he would activate 911 again or tell group home staff.  Reports that at this time he feels safe to return to the group home.  Reports he has been taking medications more consistently this weekend but can't remember prior to that and that he does not know his list and indication for the medications.  Denies access to weapons or firearms at group home; reports that he is on a 1:1 at group home. Reports his mom, a future relationship and employment as reasons to live.     Patient denies sx of acute depressive episode, including persistently depressed, sad or tearful mood, reports sometimes sad mood "comes and goes" but mood right now is "good", denies decrease in formerly pleasurable activities and still enjoying boxing and watching TV, denies guilt/worthlessness, concentration / appetite / sleep changes, suicidal or functional impairment.  Reports last showering yesterday.      Denies sx of acute everett, including euphoria / irritability, decreased need for sleep, increase in risk taking behavior / productivity or pressured speech.     Other than AH, Denies sx of acute psychosis, including VH, ideas of reference, thought insertion / broadcasting, paranoia. Denies HI.     Denies acute anxiety or PTSD Sx.     Collateral obtained by SW, see  note; reviewed. Patient w/o change in baseline sx at this time as per group home staff. 33 yr old male, single (claiming to be  as he used to be ), domiciled and unemployed (though claiming to "work for the group home").  background hx of autism complicated by moderate Intellectual disability; other diagnoses includes impulse control disorder, HAVEN, mood disorders NOS, PTSD, ADHD and factitious disorder. with past psych admissions (including last [facility]: 7/2023); high utilizer of ED/ Psych ED services involving both medical/psych complaints; seen yesterday due to abdominal pain with difficulty urinating. this morning,  ED service conducted evaluation for this Pt due to SI, "hearing voices telling him to hurt himself and others".   both these encounters led to Pt being discharged.  Pt last evaluated by this writer back in 2/6/2024 due to SI and cAH.  Pt has no documented hx of SA but does engage in series of NSSIB via cutting or head banging. has longstanding hx of endorsing SI (situational: mostly stemming from discontent of living environment).  there is hx of aggression toward group home staff.  pertinent medical issues include: asthma, DM, BPH, urinary retention, GERD, hypothyroidism, HLD, HTN, and myopia.  2nd ED presentation tonight (within the day) due to SI, engaged in self injurious behavior via cutting left forearm using a plastic knife in the context of "multiple psychosocial stressors: predominantly, resentment from his ex-fiance whom he claimed seeing her kissing another man; conflictual relationships with co residents + group home staff.  Pt also claims having "cAH: telling him to hurt self".      On evaluation, patient is alert, calm and cooperative, euthymic (but claims feeling down - does not appear dysphoric), concrete.  Reports that today, he was feeling down due to multiple psychosocial stressors - he decided to get a plastic knife in the kitchen. then started to initially cut superficially, later on, cutting more - leading to bleeding (NOT PROFUSE though). once he saw that he had been bleeding, stopped. Pt then called 911.     described mood as "sad". claims not sleeping well and not eating well (is overweight).  denied currently, that he is harboring any active SI or HI. "I am not having thoughts to hurt myself right now", he tells writer.  denied any other plans/ no researches conducted re: consummating on the SI. along with the depression, also claims feeling anxious which he described as "feeling nervous". however, denied experiencing any specific anxiety disorder symptoms. no signs/ symptoms suggestive of everett (denied grandiosity/ racing thoughts/ increased goal directed activities or engaged in risk taking behavior/ no pressured speech/ no elevated mood/ denied any increased in energy level causing sleep disruption).  is not feeling paranoid/ no referential ideations. reports experiencing AH (DOES NOT APPEAR ACTIVELY INTERNALLY PREOCCUPIED CURRENTLY) - telling him to "hurt self". currently, "I will not hurt myself", he tells writer.  denied any other perceptual disturbances. no thought insertion/ withdrawal nor broadcasting. he reports not taking his psych meds for the past 13 days    per earlier  ED encounter with Psych ED attending: Pt reported that he activated 911 as he was having thoughts of hurting himself by using a knife.  Reports that he did not have thoughts of killing himself but cutting himself with a knife to "feel better." Denies intent or plan of hurting himself and wanted help and that's was the reason he called 911.  Denies engaging in self harming behavior prior to arrival to the ED. Denies suicidal / self harming ideation, intent or plan at time of evaluation, as well.  Reports losing his job recently and break up with dannielle 2 years ago whom he still misses as possible stressors.     ** see separate [facility] notes for collateral information obtained from group home staff ** 33 yr old male, single (claiming to be  as he used to be ), domiciled and unemployed (though claiming to "work for the group home").  background hx of autism complicated by moderate Intellectual disability; other diagnoses includes impulse control disorder, HAVEN, mood disorders NOS, PTSD, ADHD and factitious disorder. with past psych admissions (including last [facility]: 7/2023); high utilizer of ED/ Psych ED services involving both medical/psych complaints; seen yesterday due to abdominal pain with difficulty urinating. this morning,  ED service conducted evaluation for this Pt due to SI, "hearing voices telling him to hurt himself and others".   both these encounters led to Pt being discharged.  Pt last evaluated by this writer back in 2/6/2024 due to SI and cAH.  Pt has no documented hx of SA but does engage in series of NSSIB via cutting or head banging. has longstanding hx of endorsing SI (situational: mostly stemming from discontent of living environment).  there is hx of aggression toward group home staff.  pertinent medical issues include: asthma, DM, BPH, urinary retention, GERD, hypothyroidism, HLD, HTN, and myopia.  2nd ED presentation tonight (within the day) due to SI, engaged in self injurious behavior via cutting left forearm using a plastic knife in the context of "multiple psychosocial stressors: predominantly, resentment from his ex-fiance whom he claimed seeing her kissing another man; conflictual relationships with co residents + group home staff.  Pt also claims having "cAH: telling him to hurt self".      On evaluation, patient is alert, calm and cooperative, euthymic (but claims feeling down - does not appear dysphoric), concrete.  Reports that today, he was feeling down, lonely, frustrated due to multiple psychosocial stressors:  no GF, does not like his group home - he decided to get a plastic knife in the kitchen. then started to initially cut superficially, later on, cutting more - leading to bleeding (NOT PROFUSE though). once he saw that he had been bleeding, stopped. Pt then called 911.     described mood as "sad". claims not sleeping well and not eating well (is overweight).  denied currently, that he is harboring any active SI or HI. "I am not having thoughts to hurt myself right now", he tells writer.  denied any other plans/ no researches conducted re: consummating on the SI. along with the depression, also claims feeling anxious which he described as "feeling nervous". however, denied experiencing any specific anxiety disorder symptoms. no signs/ symptoms suggestive of everett (denied grandiosity/ racing thoughts/ increased goal directed activities or engaged in risk taking behavior/ no pressured speech/ no elevated mood/ denied any increased in energy level causing sleep disruption).  is not feeling paranoid/ no referential ideations. reports experiencing AH (DOES NOT APPEAR ACTIVELY INTERNALLY PREOCCUPIED CURRENTLY) - telling him to "hurt self". currently, "I will not hurt myself", he tells writer.  denied any other perceptual disturbances. no thought insertion/ withdrawal nor broadcasting. he reports not taking his psych meds for the past 13 days    per earlier  ED encounter with Psych ED attending: Pt reported that he activated 911 as he was having thoughts of hurting himself by using a knife.  Reports that he did not have thoughts of killing himself but cutting himself with a knife to "feel better." Denies intent or plan of hurting himself and wanted help and that's was the reason he called 911.  Denies engaging in self harming behavior prior to arrival to the ED. Denies suicidal / self harming ideation, intent or plan at time of evaluation, as well.  Reports losing his job recently and break up with fiancee 2 years ago whom he still misses as possible stressors.     ** see separate [facility] notes for collateral information obtained from group home staff ** 33 yr old male, single (claiming to be  as he used to be ), domiciled and unemployed (though claiming to "work for the group home").  background hx of autism complicated by moderate Intellectual disability; other diagnoses includes impulse control disorder, HAVEN, mood disorders NOS, PTSD, ADHD and factitious disorder. with past psych admissions (including last [facility]: 7/2023); high utilizer of ED/ Psych ED services involving both medical/psych complaints; seen yesterday due to abdominal pain with difficulty urinating. this morning,  ED service conducted evaluation for this Pt due to SI, "hearing voices telling him to hurt himself and others".   both these encounters led to Pt being discharged.  Pt last evaluated by this writer back in 2/6/2024 due to SI and cAH.  Pt has no documented hx of SA but does engage in series of NSSIB via cutting or head banging. has longstanding hx of endorsing SI (situational: mostly stemming from discontent of living environment).  there is hx of aggression toward group home staff.  pertinent medical issues include: asthma, DM, BPH, urinary retention, GERD, hypothyroidism, HLD, HTN, and myopia.  2nd ED presentation tonight (within the day) due to SI, engaged in self injurious behavior via cutting left forearm using a plastic knife in the context of "multiple psychosocial stressors: predominantly, resentment from his ex-fiance whom he claimed seeing her kissing another man; conflictual relationships with co residents + group home staff.  Pt also claims having "cAH: telling him to hurt self".      On evaluation, patient is alert, calm and cooperative, euthymic (but claims feeling down - does not appear dysphoric), concrete.  Reports that today, he was feeling down, lonely, frustrated due to multiple psychosocial stressors:  no GF, does not like his group home - he decided to get a plastic knife in the kitchen. then started to initially cut superficially, later on, cutting more - leading to bleeding (NOT PROFUSE though). once he saw that he had been bleeding, stopped. Pt then called 911.     described mood as "sad". claims not sleeping well and not eating well (Pt is overweight).  denied currently, that he is harboring any active SI or HI. "I am not having thoughts to hurt myself right now", he tells writer.  denied any other plans/ no researches conducted re: consummating on the SI. along with the depression, also claims feeling anxious which he described as "feeling nervous". however, denied experiencing any specific anxiety disorder symptoms. no signs/ symptoms suggestive of everett (denied grandiosity/ racing thoughts/ increased goal directed activities or engaged in risk taking behavior/ no pressured speech/ no elevated mood/ denied any increased in energy level causing sleep disruption).  is not feeling paranoid/ no referential ideations. reports experiencing AH (DOES NOT APPEAR ACTIVELY INTERNALLY PREOCCUPIED CURRENTLY) - telling him to "hurt self". currently, "I will not hurt myself", he tells writer.  denied any other perceptual disturbances. no thought insertion/ withdrawal nor broadcasting. he reports not taking his psych meds for the past 13 days    per earlier  ED encounter with Psych ED attending: Pt reported that he activated 911 as he was having thoughts of hurting himself by using a knife.  Reports that he did not have thoughts of killing himself but cutting himself with a knife to "feel better." Denies intent or plan of hurting himself and wanted help and that's was the reason he called 911.  Denies engaging in self harming behavior prior to arrival to the ED. Denies suicidal / self harming ideation, intent or plan at time of evaluation, as well.  Reports losing his job recently and break up with fiancee 2 years ago whom he still misses as possible stressors.     ** see separate [facility] notes for collateral information obtained from group home staff ** 33 yr old male, single (claiming to be ), domiciled and unemployed (though claiming to "work for the group home").  background hx of autism complicated by moderate Intellectual disability; other diagnoses includes impulse control disorder, HAVEN, mood disorders NOS, PTSD, ADHD and factitious disorder. with past psych admissions (including last [facility]: 7/2023); high utilizer of ED/ Psych ED services involving both medical/psych complaints; seen yesterday due to abdominal pain with difficulty urinating. this morning,  ED service conducted evaluation for this Pt due to SI, "hearing voices telling him to hurt himself and others".   both these encounters led to Pt being discharged.  Pt last evaluated by this writer back in 2/6/2024 due to SI and cAH.  Pt has no documented hx of SA but does engage in series of NSSIB via cutting or head banging. has longstanding hx of endorsing SI (situational: mostly stemming from discontent of living environment).  there is hx of aggression toward group home staff.  pertinent medical issues include: asthma, DM, BPH, urinary retention, GERD, hypothyroidism, HLD, HTN, and myopia.  2nd ED presentation tonight (within the day) due to SI, engaged in self injurious behavior via cutting left forearm using a plastic knife in the context of "multiple psychosocial stressors: predominantly, resentment from his ex-fiance whom he claimed seeing her kissing another man; conflictual relationships with co residents + group home staff.  Pt also claims having "cAH: telling him to hurt self".      On evaluation, patient is alert, calm and cooperative, euthymic (but claims feeling down - does not appear dysphoric), concrete.  Reports that today, he was feeling down, lonely, frustrated due to multiple psychosocial stressors:  no GF, does not like his group home - he decided to get a plastic knife in the kitchen. then started to initially cut superficially, later on, cutting more - leading to bleeding (NOT PROFUSE though). once he saw that he had been bleeding, stopped. Pt then called 911.     described mood as "sad". claims not sleeping well and not eating well (Pt is overweight).  denied currently, that he is harboring any active SI or HI. "I am not having thoughts to hurt myself right now", he tells writer.  denied any other plans/ no researches conducted re: consummating on the SI. along with the depression, also claims feeling anxious which he described as "feeling nervous". however, denied experiencing any specific anxiety disorder symptoms. no signs/ symptoms suggestive of everett (denied grandiosity/ racing thoughts/ increased goal directed activities or engaged in risk taking behavior/ no pressured speech/ no elevated mood/ denied any increased in energy level causing sleep disruption).  is not feeling paranoid/ no referential ideations. reports experiencing AH (DOES NOT APPEAR ACTIVELY INTERNALLY PREOCCUPIED CURRENTLY) - telling him to "hurt self". currently, "I will not hurt myself", he tells writer.  denied any other perceptual disturbances. no thought insertion/ withdrawal nor broadcasting. he reports not taking his psych meds for the past 13 days    per earlier  ED encounter with Psych ED attending: Pt reported that he activated 911 as he was having thoughts of hurting himself by using a knife.  Reports that he did not have thoughts of killing himself but cutting himself with a knife to "feel better." Denies intent or plan of hurting himself and wanted help and that's was the reason he called 911.  Denies engaging in self harming behavior prior to arrival to the ED. Denies suicidal / self harming ideation, intent or plan at time of evaluation, as well.  Reports losing his job recently and break up with titae 2 years ago whom he still misses as possible stressors.     ** see separate [facility] notes for collateral information obtained from group home staff ** 33 yr old male, single (claiming to be ), domiciled and unemployed (though claiming to "work for the group home").  background hx of autism complicated by moderate Intellectual disability; other diagnoses includes impulse control disorder, HAVEN, mood disorders NOS, PTSD, ADHD and factitious disorder. with past psych admissions (including last H: 7/2023); high utilizer of ED/ Psych ED services involving both medical/psych complaints; seen yesterday due to abdominal pain with difficulty urinating. this morning,  ED service conducted evaluation for this Pt due to SI, "hearing voices telling him to hurt himself and others".   both these encounters led to Pt being discharged.  Pt last evaluated by this writer back in 2/6/2024 due to SI and cAH.  Pt has no documented hx of SA but does engage in series of NSSIB via cutting or head banging. has longstanding hx of endorsing SI (situational: mostly stemming from discontent of living environment).  there is hx of aggression toward group home staff.  pertinent medical issues include: asthma, DM, BPH, urinary retention, GERD, hypothyroidism, HLD, HTN, and myopia.  2nd ED presentation tonight (within the day) due to SI, engaged in self injurious behavior via cutting left forearm using a plastic knife in the context of "multiple psychosocial stressors: predominantly, resentment from his ex-fiance whom he claimed seeing her kissing another man; conflictual relationships with co residents + group home staff.  Pt also claims having "cAH: telling him to hurt self"; not others    On evaluation, patient is alert, calm and cooperative, euthymic (but claims feeling down - does not appear dysphoric), concrete.  Reports that today, he was feeling down, lonely, frustrated due to multiple psychosocial stressors:  no GF, does not like his group home - he decided to get a plastic knife in the kitchen. then started to initially cut superficially, later on, cutting more - leading to bleeding (NOT PROFUSE though). once he saw that he had been bleeding, stopped. Pt then called 911.     described mood as "sad". claims not sleeping well and not eating well (Pt is overweight).  denied currently, that he is harboring any active SI or HI. "I am not having thoughts to hurt myself right now", he tells writer.  denied any other plans/ no researches conducted re: consummating on the SI. along with the depression, also claims feeling anxious which he described as "feeling nervous". however, denied experiencing any specific anxiety disorder symptoms. no signs/ symptoms suggestive of everett (denied grandiosity/ racing thoughts/ increased goal directed activities or engaged in risk taking behavior/ no pressured speech/ no elevated mood/ denied any increased in energy level causing sleep disruption).  is not feeling paranoid/ no referential ideations. reports experiencing AH (DOES NOT APPEAR ACTIVELY INTERNALLY PREOCCUPIED CURRENTLY) - telling him to "hurt self". currently, "I will not hurt myself", he tells writer.  denied any other perceptual disturbances. no thought insertion/ withdrawal nor broadcasting. he reports not taking his psych meds for the past 13 days    per earlier  ED encounter with Psych ED attending: Pt reported that he activated 911 as he was having thoughts of hurting himself by using a knife.  Reports that he did not have thoughts of killing himself but cutting himself with a knife to "feel better." Denies intent or plan of hurting himself and wanted help and that's was the reason he called 911.  Denies engaging in self harming behavior prior to arrival to the ED. Denies suicidal / self harming ideation, intent or plan at time of evaluation, as well.  Reports losing his job recently and break up with titae 2 years ago whom he still misses as possible stressors.     ** see separate [facility] notes for collateral information obtained from group home staff **

## 2024-02-27 NOTE — ED BEHAVIORAL HEALTH ASSESSMENT NOTE - RISK ASSESSMENT
Given the pt's history, he is at a chronically elevated risk of harm due to his static risk factors(prior psych history, chronic medical conditions, hx of SIB and aggression) and modifiable risk factors(impulsivity, poor coping skills). However, his risk is mitigated by the fact he currently denies SI or urges to harm himself, presents as future-oriented, is able to safety plan, help seeking, has support from family and  staff, is on 1:1 at his , potentially dangerous items are secured in his residence, he denies access to firearms, has no known suicide attempts, has remained in good control in the ED, and currently does not report or manifest symptoms of acute psychosis, everett, or depression. In light of these mitigating factors, patient does not appear to be at imminent risk for harm to self / others and does not meet criteria for involuntary admission at this time.

## 2024-02-27 NOTE — ED ADULT NURSE NOTE - OBJECTIVE STATEMENT
pt. received to  from ambulance bay triage brought in by EMS from a group home p/w S/I.  pt. endorses voices telling him to split open a healing wound in attempts to harm himself. laceration wrapped w/ gauze via EMS. laceration noted on the abdomen, no active bleeding. pt. maintains eye contact upon interview. pt. calm, cooperative and rediretable. Denies HI, VH, ETOH/Drug use. Pt. belongings secured. pt. wanded for safety. pt. independently changed into  Clothing. eval on going at this time.

## 2024-02-27 NOTE — ED BEHAVIORAL HEALTH ASSESSMENT NOTE - OTHER
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

## 2024-02-27 NOTE — ED BEHAVIORAL HEALTH ASSESSMENT NOTE - OTHER PAST PSYCHIATRIC HISTORY (INCLUDE DETAILS REGARDING ONSET, COURSE OF ILLNESS, INPATIENT/OUTPATIENT TREATMENT)
Per chart review, pt has had multiple psych ED visits for medical and psychiatric complaints, was recently evaluated in ED by psych for similar complaints on 2/22/24, 2/6/24, 12/28/23, 11/21/23, 11/05/23, 09/27/23, 9/14/23, 8/20/23, 7/24/23, 2/6/2024, and 2/27/204.  Patient initially unable to urine and require Sarabia for UA.  Sarabia was removed and patient subsequently able to void on his own and psychiatry consulted for above presenting sx. Per chart review, with multiple psych ED visits for medical and psychiatric complaints, was recently evaluated in ED by psych for similar complaints on 2/22/24, 2/6/24, 12/28/23, 11/21/23, 11/05/23, 09/27/23, 9/14/23, 8/20/23, 7/24/23, 2/6/2024, and 2/27/2024  - med ED evaluation noted that Pt was initially unable to urine and required Sarabia cath;   Sarabia was removed and Pt subsequently able to void on his own     has multiple psych admissions + multiple psych ED visits  no reported hx of SA but does have multiple episodes of NSSIB  admits non adherence to meds

## 2024-02-27 NOTE — ED BEHAVIORAL HEALTH ASSESSMENT NOTE - SUMMARY
Pt is a 32 yo M, single, non-caregiver, resides at a [facility], non-caregiver, with PMHx of asthma, DM, urinary retention, GERD, BPH, hypothyroidism, HLD, HTN, and b/l myopia, with psych hx of moderate ID, ASD, impulse control disorder, factitious disorder, HAVEN, mood disorders, PTSD and ADHD, multiple past psych admissions, was last at [facility] 7/13-7/21/2023, numerous ED visits for both medical/psych complaints, well-known to telepsychiatry for frequent similar presentations, with longstanding h/o SIB (head banging, cutting), no known SA, longstanding h/o endorsing SI, remote h/o aggression toward staff at group home, on Depakote 500 mg qAM & 1000mg qPM, Guanfacine 2 mg, Risperdal 3 mg BID, and Remeron 30 mg, who presents to ED c/o SI, hearing voices telling him to hurt himself and others.     On evaluation, patient reports resolution of self-harming ideation that he presented to the ED with.  He denies that he had intent or plan to self harm or ideations to end his life at that time and that he activated 911 due to the self harming ideation alone and utilization of coming to the ED as a safety plan.  Patient also denies HI at time of evaluation as well as prior, and denies AVH at time of assessment, as well, and reports that AH are chronic and is able to provide coping mechanisms/ safety plan of how to distract self when AH are present.   At this time, given pt denies SI/HI, sx of acute everett, psychosis, depression, impairment in functioning patient does not meet criteria for involuntary inpatient admission at this time and is able to engage in safety planning and feels safe returning to his group home, which has no imminent safety concerns at this time.     Recommendations:   -no acute psychiatric intervention indicated at this time; treat and release  -continue follow up with outpatient psychiatrist   -continue home medications - regular compliance encouraged by writer   -return to the ED if sx return / worsen 33/M with reported hx of Autism spectrum disorder, impulse control disorder, factitious disorder, HAVEN, mood disorder, PTSD, ADHD and moderate ID.  has multiple psych admissions including H admissions; has multiple ED visits for both medical/psych complaints. no documented hx of SA but does have chronic hx of engaging in self injurious behaviors namely: head banging and cutting.  there is past hx of aggression toward staff at his group home.  pertinent med issues include: asthma, DM, urinary retention, GERD, BPH, hypothyroidism, HLD, HTN, and myopia.  Of note, pt has had multiple psych ED visits for medical and psychiatric complaints, was recently evaluated in ED by psych for similar complaints on 12/28/23, 11/21/23, 11/05/23, 09/27/23, 9/14/23, 8/20/23, 7/24/23, 2/6/2024 and 2/27/2024 (for SI + cAH).   tonight, once again presented to the ED BIB EMS activated by self due to SIB via cutting left forearm using a knife as a means to vent frustration over multiple psychosocial stressors.  psych was consulted to assess for the NSSIB + SI + CAH (hearing voices telling him to harm self).     at this time, said SI, cutting behavior and cAH are NOT CONSEQUENTIAL of an uncontrolled or poorly controlled primary or psychotic disorder.  rather, said presentation is consequential of Pt's underlying axis II pathology (in this case, his intellectual disability) compounded by autism spectrum disorder causing him to chronically exhibit poor frustration tolerance often leading to inability to delay gratification as well as extremely limited coping skills.      currently, there is no evidence of any formal thought disorder, internal preoccupation or response to internal stimuli that would be concerning for acute psychosis.  he is not suicidal or homicidal.      Given Pt's clinical hx of Intellectual Disability compounded by other affective disorders, these subset of Pt would not benefit from in-pt hospitalization as there are no acute off-baseline symptoms to target.  For future management for this Pt, it is important to note that as per studies, "In people with significant developmental delays, agitated and aggressive behavior may be a means of expressing frustration, a learned problem behavior, an expression of physical pain or acute medical problem, a means of communication, or a signal of an acute psychiatric problem (Mary et al., manuscript in preparation; Inocente, 2000; Altaf, Ellen Brown, & Markus, 1989; Ki & Abigail, 1997; Binh & Logan, 1986). It is common for persons with intellectual disability who have been stable and well adjusted to exhibit regression in situations of stress, pain, changes in routine, or novelty."      currently, the Pt is not manic, not psychotic, not severely depressed, not severely anxious.  he is currently not harboring any passive or active suicidal or homicidal ideations/ intent/ plans.  THERE IS NOT CURRENT JUSTIFICATION TO PURSUE PSYCH ADMISSION FOR THIS PATIENT.  he can be discharged tomorrow AM and continue to be seen in the community. 33/M with reported hx of Autism spectrum disorder, impulse control disorder, factitious disorder, HAVEN, mood disorder, PTSD, ADHD and moderate ID.  has multiple psych admissions including H admissions; has multiple ED visits for both medical/psych complaints. no documented hx of SA but does have chronic hx of engaging in self injurious behaviors namely: head banging and cutting.  there is past hx of aggression toward staff at his group home.  pertinent med issues include: asthma, DM, urinary retention, GERD, BPH, hypothyroidism, HLD, HTN, and myopia.  Of note, pt has had multiple psych ED visits for medical and psychiatric complaints, was recently evaluated in ED by psych for similar complaints on 12/28/23, 11/21/23, 11/05/23, 09/27/23, 9/14/23, 8/20/23, 7/24/23, 2/6/2024 and 2/27/2024 (for SI + cAH).   tonight, once again presented to the ED BIB EMS activated by self due to SIB via cutting left forearm using a knife as a means to vent frustration over multiple psychosocial stressors.  psych was consulted to assess for the NSSIB + SI + CAH (hearing voices telling him to harm self).     at this time, said SI, cutting behavior and cAH are NOT CONSEQUENTIAL of an uncontrolled or poorly controlled primary or psychotic disorder.  rather, said presentation is consequential of Pt's underlying axis II pathology (in this case, his intellectual disability) compounded by autism spectrum disorder causing him to chronically exhibit poor frustration tolerance often leading to inability to delay gratification as well as extremely limited coping skills.      currently, there is no evidence of any formal thought disorder, internal preoccupation or response to internal stimuli that would be concerning for acute psychosis.  he is not suicidal or homicidal.      Given Pt's clinical hx of Intellectual Disability compounded by other affective disorders, these subset of Pt would not benefit from in-pt hospitalization as there are no acute off-baseline symptoms to target.  For future management for this Pt, it is important to note that as per studies, "In people with significant developmental delays, agitated and aggressive behavior may be a means of expressing frustration, a learned problem behavior, an expression of physical pain or acute medical problem, a means of communication, or a signal of an acute psychiatric problem (Mary et al., manuscript in preparation; Inocente, 2000; Altaf, Ellen Brown, & Markus, 1989; Ki & Abigail, 1997; Binh & Logan, 1986). It is common for persons with intellectual disability who have been stable and well adjusted to exhibit regression in situations of stress, pain, changes in routine, or novelty."      currently, the Pt is not manic, not psychotic, not severely depressed, not severely anxious.  he is currently not harboring any passive or active suicidal or homicidal ideations/ intent/ plans.  THERE IS NOT CURRENT JUSTIFICATION TO PURSUE PSYCH ADMISSION FOR THIS PATIENT.  he can be discharged tomorrow AM and continue to be seen in the community.      RECOMMENDATIONS:   1. Psychoeducation provided.  Encouraged follow up with OP psych services.  No indication for emergent psych meds changes at this time. encouraged to be more compliant with meds for better control of symptoms   2. Emergency protocol reviewed.  Pt and staff were adviced to call 911 or come to the nearest ED should symptoms worsen; have increasing bouts of agitation/aggressive behavior; having SI/HI; or call [phone]    3. follow up with [facility] as scheduled  4. no new psych meds prescribed 33/M with reported hx of Autism spectrum disorder, impulse control disorder, factitious disorder, HAVEN, mood disorder, PTSD, ADHD and moderate ID.  has multiple psych admissions including H admissions; has multiple ED visits for both medical/psych complaints. no documented hx of SA but does have chronic hx of engaging in self injurious behaviors namely: head banging and cutting.  there is past hx of aggression toward staff at his group home.  pertinent med issues include: asthma, DM, urinary retention, GERD, BPH, hypothyroidism, HLD, HTN, and myopia.  Of note, pt has had multiple psych ED visits for medical and psychiatric complaints, was recently evaluated in ED by psych for similar complaints on 12/28/23, 11/21/23, 11/05/23, 09/27/23, 9/14/23, 8/20/23, 7/24/23, 2/6/2024 and 2/27/2024 (for SI + cAH).   tonight, once again presented to the ED BIB EMS activated by self due to SIB via cutting left forearm using a knife as a means to vent frustration over multiple psychosocial stressors.  psych was consulted to assess for the NSSIB + SI + CAH (hearing voices telling him to harm self).     at this time, said SI, cutting behavior and cAH are NOT CONSEQUENTIAL of an uncontrolled or poorly controlled primary or psychotic disorder.  rather, said presentation is consequential of Pt's underlying axis II pathology (in this case, his intellectual disability) compounded by autism spectrum disorder causing him to chronically exhibit poor frustration tolerance often leading to inability to delay gratification as well as extremely limited coping skills.      currently, there is no evidence of any formal thought disorder, internal preoccupation or response to internal stimuli that would be concerning for acute psychosis.  he is not suicidal or homicidal.      Given Pt's clinical hx of Intellectual Disability compounded by other affective disorders, these subset of Pt would not benefit from in-pt hospitalization as there are no acute off-baseline symptoms to target.  For future management for this Pt, it is important to note that as per studies, "In people with significant developmental delays, agitated and aggressive behavior may be a means of expressing frustration, a learned problem behavior, an expression of physical pain or acute medical problem, a means of communication, or a signal of an acute psychiatric problem (Mary et al., manuscript in preparation; Inocente, 2000; Altaf, Ellen Brown, & Markus, 1989; Ki & Abigail, 1997; Binh & Logan, 1986). It is common for persons with intellectual disability who have been stable and well adjusted to exhibit regression in situations of stress, pain, changes in routine, or novelty."      currently, the Pt is not manic, not psychotic, not severely depressed, not severely anxious.  he is currently not harboring any passive or active suicidal or homicidal ideations/ intent/ plans.  THERE IS NOT CURRENT JUSTIFICATION TO PURSUE PSYCH ADMISSION FOR THIS PATIENT as presentation is purely behavioral in nature.  he can be discharged tomorrow AM and continue to be seen in the community.      RECOMMENDATIONS:   1. Psychoeducation provided.  Encouraged follow up with OP psych services.  No indication for emergent psych meds changes at this time. encouraged to be more compliant with meds for better control of symptoms   2. Emergency protocol reviewed.  Pt and staff were adviced to call 911 or come to the nearest ED should symptoms worsen; have increasing bouts of agitation/aggressive behavior; having SI/HI; or call [phone]    3. follow up with [facility] as scheduled  4. no new psych meds prescribed

## 2024-02-27 NOTE — ED BEHAVIORAL HEALTH ASSESSMENT NOTE - NSBHROSSYSTEMS_PSY_ALL_CORE
Pt currently denies experiencing any headaches; has no dizziness, no blurring of vision; no cough/ colds, no sore throat; no fever. not complaining of any chest pains, no SOB, no palpitations; no abdominal/ flank pains, no nausea/ vomiting or diarrhea/ constipation; no dysuria, no hesitancy, no polyuria, no hematuria; no pruritus/ no rashes nor any edema. denied any muscle/ joint pains/Psychiatric Psychiatric

## 2024-02-27 NOTE — ED BEHAVIORAL HEALTH ASSESSMENT NOTE - ADDITIONAL DETAILS ALL
as per HPI patient with hx of reported self cutting on 2/22/24 as per chart review ; denies self harm since then.

## 2024-02-27 NOTE — ED BEHAVIORAL HEALTH NOTE - BEHAVIORAL HEALTH NOTE
Search Terms: markus bazan, 1990Search Date: 02/27/2024 11:49:03 AM  Searching on behalf of: Myself  The Drug Utilization Report below displays all of the controlled substance prescriptions, if any, that your patient has filled in the last twelve months. The information displayed on this report is compiled from pharmacy submissions to the Department, and accurately reflects the information as submitted by the pharmacies.    This report was requested by: Kandy Kennedy | Reference #: [id number]    Practitioner Count: 0  Pharmacy Count: 0  Current Opioid Prescriptions: 0  Current Benzodiazepine Prescriptions: 0  Current Stimulant Prescriptions: 0      Patient Demographic Information (PDI)       PDI	First Name	Last Name	Birth Date	Gender	Street Address	[facility] Code  A	Markus Bazan	1990	Male	[address]	[place]	[address]  B	Markus Bazan	1990	Male	[address]	[place]	[id number]    Prescription Information      PDI Filter:    PDI	My Rx	Current Rx	Drug Type	Rx Written	Rx Dispensed	Drug	Quantity	Days Supply	Prescriber Name	Prescriber LUIS #	Payment Method	Dispenser  A	N	N	O	10/30/2023	10/30/2023	oxycodone hcl (ir) 5 mg tablet	9	3	[facility]	[id number]	Insurance	[facility]  B	N	N	O	10/25/2023	10/25/2023	oxycodone-acetaminophen 5-325 mg tablet	10	2	Sarai Osei S, (DO)	[id number]	Medicaid	[facility]    * - Details of Drug Type : O = Opioid, B = Benzodiazepine, S = Stimulant    * - Drugs marked with an asterisk are compound drugs. If the compound drug is made up of more than one controlled substance, then each controlled substance will be a separate row in the table.

## 2024-02-27 NOTE — ED BEHAVIORAL HEALTH ASSESSMENT NOTE - DESCRIPTION
as per HPI asthma, DM, BPH, urinary retention, GERD, hypothyroidism, HLD, HTN, and myopia. single, claimed "used to be  to now ex-fiance of 2 yrs". likes boxing, watching TV, listening to music. practicing Cheondoism. also claims to be "part Yarsani". no reported access to guns. Since Pt's  ED arrival, Pt has been calm and cooperative.  There has been no occurrence of agitation/aggressive behavior.  No current verbalization of active/ passive SI/HI.   There are no signs/symptoms of severe MDD/ not psychomotorically retarded, not severely anxious, not acutely manic or floridly psychotic. Pt is not showing any signs/symptoms of intoxication or withdrawal.  Pt is not delirious.. has not tested limits.. Has maintained appropriate boundaries. Pt has been easily redirected.  NO PRN meds given.  Overall, there has been no management issues.      Vital Signs Last 24 Hrs  T(C): 36.8 (27 Feb 2024 20:33), Max: 36.9 (27 Feb 2024 12:15)  T(F): 98.2 (27 Feb 2024 20:33), Max: 98.4 (27 Feb 2024 12:15)  HR: 96 (27 Feb 2024 20:33) (73 - 96)  BP: 116/83 (27 Feb 2024 20:33) (116/83 - 135/86)  BP(mean): --  RR: 18 (27 Feb 2024 20:33) (16 - 18)  SpO2: 98% (27 Feb 2024 20:33) (98% - 100%)    Parameters below as of 27 Feb 2024 20:33  Patient On (Oxygen Delivery Method): room air

## 2024-02-27 NOTE — ED BEHAVIORAL HEALTH NOTE - BEHAVIORAL HEALTH NOTE
As per request of provider, writer contacted the group home assistant supervisor michela ([phone]) to obtain collateral information. She advised writer to contact supervisor armin  [phone]. The following information is per the supervisor.  [facility] 32 yo [facility], hx of schizophrenia, anxiety, IDD, bib ems.    Reason for ed visit: pt called EMS and said he had cut himself. Staff did not witness this and he did not want to show staff the cut. Staff reports pt appeared happy when he got home from hospital earlier today. Staff planned to take him out tomorrow to take him grocery shopping. Pt went out with staff today.  Staff says this is part of his behavior.    Symptoms/hx:  she reports pt likes to stay busy. She says pt did not endorse si but has a hx of si. She says pt has a hx of hearing voices. They do not know about any past suicide attempts. Staff reports a hx of cutting. She says the pt’s home is safeguarded but pt will hide things in his pocket. Pt has cut himself with plastic pieces and hangers. Pt usually tries to go to the hospital to avoid work training which they have been exploring with the tp. She reports pt’s hygiene is poor, sleep is okay and appetite is okay.    Baseline: threats of cutting is part of his behavior. Pt comes to hospital to socialize.     Stressors: exploring work with family.    Drugs/alcohol use: none reported.    Treatment team: on and off with counseling, pt has a psychiatrist dr moreland through [facility].    Medication:  inconsistent with medication. Medication list sent with pt.    Medical problems: removed his testicles in October 2023.    Family hx: unknown.    Violence/aggression: slapped staff 2 weeks ago.     Dispo: supervisor armin asked for an update upon discharge ([phone]).

## 2024-02-27 NOTE — ED ADULT NURSE REASSESSMENT NOTE - NS ED NURSE REASSESS COMMENT FT1
Report given to ANALI Albright from  for continuity of care. Report given to ANALI Albright from  for continuity of care. IV removed, clean, dry dressing applied.

## 2024-02-27 NOTE — ED BEHAVIORAL HEALTH ASSESSMENT NOTE - PAST PSYCHOTROPIC MEDICATION
as per chart review - Zyprexa,  Haloperidol, Clonidine, Topamax, Klonopin, Seroquel, Thorazine, Abilify, Trazodone, Gabapentin, Tegetrol, Trileptal, Perphenazine, Prozac, Lithium, Zoloft and Hydroxyzine. as per chart review - multiple meds trial to include Zyprexa,  Haldol, Clonidine, Topamax, Klonopin, Seroquel, Thorazine, Abilify, Trazodone, Gabapentin, Tegretol, Trileptal, Perphenazine, Prozac, Lithium, Zoloft and atarax

## 2024-02-27 NOTE — ED BEHAVIORAL HEALTH ASSESSMENT NOTE - SAFETY PLAN DISCUSSED WITH:
Recommendations from today's MTM visit:                                                       1. Give me a call with the name of the dermatology place you are thinking of and we can put in a Dermatology referral.     2. Start duloxetine 30mg daily    3. Lidocaine ointment for foot ordered to the pharmacy.     4. Okay to keep other medication the same for now, but in 1-2 weeks would like to try to reduce to 3 or less Hydrocodone per day.       Follow-up: Return in 4 weeks (on 6/9/2021) for Phone call with MTM.       It was great to speak with you today.  I value your experience and would be very thankful for your time with providing feedback on our clinic survey. You may receive a survey via email or text message in the next few days.       My Clinical Pharmacist's contact information:                                                      Please feel free to contact me with any questions or concerns you have.      Lisa Schafer, Pharm.D, [facility]  Medication Therapy Management Pharmacist  [phone]       Patient Patient/Other

## 2024-02-27 NOTE — ED PROVIDER NOTE - PATIENT PORTAL LINK FT
You can access the FollowMyHealth Patient Portal offered by [facility] by registering at the following website: [email or web address]. By joining [facility]’s FollowMyHealth portal, you will also be able to view your health information using other applications (apps) compatible with our system.

## 2024-02-27 NOTE — ED BEHAVIORAL HEALTH ASSESSMENT NOTE - SAFETY PLAN ADDT'L DETAILS
Safety plan discussed with.../Education provided regarding environmental safety / lethal means restriction/Provision of National Suicide Prevention Lifeline 8-775-362-NYXB (0767)

## 2024-02-27 NOTE — ED BEHAVIORAL HEALTH NOTE - BEHAVIORAL HEALTH NOTE
Per provider, Kandy, patient is cleared and is able to return back to their previous residence, [facility] ([address] [place] [address]. KEKE contacted the group [place] ([phone]) and spoke with Josselyn. SW confirmed that patient's mode of transportation is uber and that patient travels with supervision. Patient is here at the ED with aid who will arrange transportation back to home. Clinical provider is in agreement with plan. No further SW needs. Per provider, Kandy, patient is cleared and is able to return back to their previous residence, [facility] ([address] [place] [address]. KEKE contacted the group [place] ([phone]) and spoke with Josselyn. Per Josselyn, no report of self harm by patient. However, patient was presenting at baseline and "rocking back and forth." No other concerns reported. SW confirmed that patient's mode of transportation is uber and that patient travels with supervision. Patient is here at the ED with aid. SW met with Aid who noted that patient used to live in the home by himself where he tended to get more attention. Now that patient is living with multiple residents he gets upset when he isn't getting attention and wants to come to the ED. The aid confirmed that she will arrange transportation to take patient back to group [place]. Clinical provider is in agreement with plan. No further SW needs.

## 2024-02-27 NOTE — ED BEHAVIORAL HEALTH ASSESSMENT NOTE - MEDICATIONS (PRESCRIPTIONS, DIRECTIONS)
Continue with current med regimen and discuss possible adjustments with outpt provider if needed encouraged to be more compliant with current med regimen; staff made aware to have discussion over possible adjustments with out-Pt provider if needed

## 2024-02-27 NOTE — ED PROVIDER NOTE - CLINICAL SUMMARY MEDICAL DECISION MAKING FREE TEXT BOX
Patient presents emergency department with recurrent visits for Patient presents emergency department with recurrent visits for self harm. Endorses AH.  Laceration to arm  Minor controlled with bleeding  Psych consult  SW collateral  Dispo Home

## 2024-02-28 VITALS
SYSTOLIC BLOOD PRESSURE: 107 MMHG | RESPIRATION RATE: 17 BRPM | DIASTOLIC BLOOD PRESSURE: 73 MMHG | HEART RATE: 79 BPM | OXYGEN SATURATION: 98 % | TEMPERATURE: 98 F

## 2024-02-28 LAB
CULTURE RESULTS: SIGNIFICANT CHANGE UP
SPECIMEN SOURCE: SIGNIFICANT CHANGE UP

## 2024-02-28 NOTE — ED BEHAVIORAL HEALTH NOTE - BEHAVIORAL HEALTH NOTE
Per provider, Britney, patient is cleared and is able to return to their previous residence, [facility] ([address] [place] [address]. SW contacted the group home ([phone]) and spoke with Health Care Specialist, Jerry. SW confirmed that staff member, Alee is on route to the hospital to bring patient back to the group home via uber. Clinical provider is in agreement with plan. No further SW needs.

## 2024-02-28 NOTE — ED ADULT NURSE REASSESSMENT NOTE - NS ED NURSE REASSESS COMMENT FT1
Received pt from RN break coverage. Pt is sleeping in bed in no acute distress with even unlabored respirations observed. VSS. Ongoing monitoring for safety.

## 2024-02-28 NOTE — ED ADULT NURSE REASSESSMENT NOTE - NS ED NURSE REASSESS COMMENT FT1
Break coverage RN- Pt sleeping in  room 3.  Respirations even and unlabored. Will continue to monitor.

## 2024-02-28 NOTE — ED ADULT NURSE REASSESSMENT NOTE - NS ED NURSE REASSESS COMMENT FT1
Pt is sleeping in bed, no acute distress with even unlabored respirations observed. VSS. MC and Psychiatrically cleared for DC in AM to residence. Ongoing monitoring for safety.

## 2024-03-01 NOTE — ED BEHAVIORAL HEALTH NOTE - BEHAVIORAL HEALTH NOTE
03/01/24 writer contacted pt at  ([phone]). Writer left a vm requesting a return call. NATHANIEL High risk f/u:  writer contacted pt at  ([phone]). Writer left a vm requesting a return call. NATHANIEL

## 2024-03-03 ENCOUNTER — NON-APPOINTMENT (OUTPATIENT)
Age: 34
End: 2024-03-03

## 2024-03-04 ENCOUNTER — EMERGENCY (EMERGENCY)
Facility: HOSPITAL | Age: 34
LOS: 1 days | Discharge: HOME | End: 2024-03-04
Attending: STUDENT IN AN ORGANIZED HEALTH CARE EDUCATION/TRAINING PROGRAM
Payer: MEDICAID

## 2024-03-04 VITALS
RESPIRATION RATE: 22 BRPM | HEIGHT: 71 IN | TEMPERATURE: 99 F | DIASTOLIC BLOOD PRESSURE: 100 MMHG | HEART RATE: 112 BPM | SYSTOLIC BLOOD PRESSURE: 176 MMHG | OXYGEN SATURATION: 99 %

## 2024-03-04 VITALS
DIASTOLIC BLOOD PRESSURE: 65 MMHG | TEMPERATURE: 101 F | OXYGEN SATURATION: 95 % | HEART RATE: 120 BPM | SYSTOLIC BLOOD PRESSURE: 100 MMHG | RESPIRATION RATE: 18 BRPM

## 2024-03-04 DIAGNOSIS — Z90.79 ACQUIRED ABSENCE OF OTHER GENITAL ORGAN(S): Chronic | ICD-10-CM

## 2024-03-04 PROCEDURE — 99284 EMERGENCY DEPT VISIT MOD MDM: CPT

## 2024-03-04 PROCEDURE — 93005 ELECTROCARDIOGRAM TRACING: CPT

## 2024-03-04 PROCEDURE — 94640 AIRWAY INHALATION TREATMENT: CPT

## 2024-03-04 PROCEDURE — 99283 EMERGENCY DEPT VISIT LOW MDM: CPT | Mod: 25

## 2024-03-04 RX ORDER — IBUPROFEN 200 MG
600 TABLET ORAL ONCE
Refills: 0 | Status: COMPLETED | OUTPATIENT
Start: 2024-03-04 | End: 2024-03-04

## 2024-03-04 RX ORDER — ALBUTEROL 90 UG/1
2.5 AEROSOL, METERED ORAL ONCE
Refills: 0 | Status: COMPLETED | OUTPATIENT
Start: 2024-03-04 | End: 2024-03-04

## 2024-03-04 RX ORDER — DEXAMETHASONE 0.5 MG/5ML
4 ELIXIR ORAL ONCE
Refills: 0 | Status: COMPLETED | OUTPATIENT
Start: 2024-03-04 | End: 2024-03-04

## 2024-03-04 RX ORDER — ACETAMINOPHEN 500 MG
975 TABLET ORAL ONCE
Refills: 0 | Status: COMPLETED | OUTPATIENT
Start: 2024-03-04 | End: 2024-03-04

## 2024-03-04 RX ADMIN — Medication 600 MILLIGRAM(S): at 17:49

## 2024-03-04 RX ADMIN — ALBUTEROL 2.5 MILLIGRAM(S): 90 AEROSOL, METERED ORAL at 17:20

## 2024-03-04 RX ADMIN — Medication 975 MILLIGRAM(S): at 17:49

## 2024-03-04 RX ADMIN — Medication 4 MILLIGRAM(S): at 17:20

## 2024-03-04 RX ADMIN — Medication 975 MILLIGRAM(S): at 17:19

## 2024-03-04 RX ADMIN — Medication 600 MILLIGRAM(S): at 17:19

## 2024-03-04 RX ADMIN — Medication 100 MILLIGRAM(S): at 17:20

## 2024-03-04 RX ADMIN — ALBUTEROL 2.5 MILLIGRAM(S): 90 AEROSOL, METERED ORAL at 17:25

## 2024-03-04 NOTE — ED PROVIDER NOTE - OBJECTIVE STATEMENT
33-year-old male, PMH of intellectual disability, DM, asthma, autism, urinary tension, presenting with 1 day of cough and chest pain.  Symptoms began today and patient went to urgent care and was found to be flu positive.  Only has chest pain with coughing.

## 2024-03-04 NOTE — ED PROVIDER NOTE - PATIENT PORTAL LINK FT
You can access the FollowMyHealth Patient Portal offered by [facility] by registering at the following website: [email or web address]. By joining [employer]’s FollowMyHealth portal, you will also be able to view your health information using other applications (apps) compatible with our system.

## 2024-03-04 NOTE — ED PROVIDER NOTE - CLINICAL SUMMARY MEDICAL DECISION MAKING FREE TEXT BOX
33-year-old male presenting with fever, cough and chest pain after being diagnosed with the flu.  Patient breathing company on room air.  Low concern for pneumonia at this time given patient is flu positive.  low concern for ACSWill treat symptoms.  Patient given Tamiflu at urgent care.

## 2024-03-04 NOTE — ED ADULT NURSE NOTE - NSFALLUNIVINTERV_ED_ALL_ED
Bed/Stretcher in lowest position, wheels locked, appropriate side rails in place/Call bell, personal items and telephone in reach/Instruct patient to call for assistance before getting out of bed/chair/stretcher/Non-slip footwear applied when patient is off stretcher/[place] to call system/Physically safe environment - no spills, clutter or unnecessary equipment/Purposeful proactive rounding/Room/bathroom lighting operational, light cord in reach

## 2024-03-04 NOTE — ED PROVIDER NOTE - NSFOLLOWUPINSTRUCTIONS_ED_ALL_ED_FT
You were seen in the emergency department for cough likely caused by the flu.     Please follow-up with your primary care doctor.    Please take Ibuprofen and Tylenol as prescribed on the bottles for symptom control.     If you have any worsening symptoms, severe headache, chest pain, trouble breathing, please return to the emergency department.

## 2024-03-04 NOTE — ED PROVIDER NOTE - ST/T WAVE
Tahira eats every 3 hours around the clock at 3am, 6am, 9am, 12pm, 3pm, 6pm, 9pm, 12am.   She usually takes about 35-60mls (1.5-2oz) of breast milk or Neosure 22cal.    She needs 3 feeding of Neosure 22cal a day @ 3am, 12pm, and 6pm.    no elevations or depressions

## 2024-03-04 NOTE — ED PROVIDER NOTE - PHYSICAL EXAMINATION
General: well appearing male, no acute distress   HEENT: normocephalic, atraumatic   Respiratory: normal work of breathing, lungs clear to auscultation bilaterally   Cardiac: regular rate and rhythm   Skin: warm, dry   Neuro: A&Ox3  Psych: appropriate affect

## 2024-03-09 ENCOUNTER — EMERGENCY (EMERGENCY)
Facility: HOSPITAL | Age: 34
LOS: 1 days | Discharge: HOME | End: 2024-03-09
Attending: STUDENT IN AN ORGANIZED HEALTH CARE EDUCATION/TRAINING PROGRAM | Admitting: STUDENT IN AN ORGANIZED HEALTH CARE EDUCATION/TRAINING PROGRAM
Payer: MEDICAID

## 2024-03-09 VITALS
HEART RATE: 88 BPM | HEIGHT: 71 IN | WEIGHT: 289.91 LBS | SYSTOLIC BLOOD PRESSURE: 105 MMHG | RESPIRATION RATE: 16 BRPM | TEMPERATURE: 98 F | DIASTOLIC BLOOD PRESSURE: 68 MMHG | OXYGEN SATURATION: 98 %

## 2024-03-09 DIAGNOSIS — Z90.79 ACQUIRED ABSENCE OF OTHER GENITAL ORGAN(S): Chronic | ICD-10-CM

## 2024-03-09 PROCEDURE — 71046 X-RAY EXAM CHEST 2 VIEWS: CPT

## 2024-03-09 PROCEDURE — 99284 EMERGENCY DEPT VISIT MOD MDM: CPT

## 2024-03-09 PROCEDURE — 71046 X-RAY EXAM CHEST 2 VIEWS: CPT | Mod: 26

## 2024-03-09 PROCEDURE — 99283 EMERGENCY DEPT VISIT LOW MDM: CPT | Mod: 25

## 2024-03-09 PROCEDURE — 94640 AIRWAY INHALATION TREATMENT: CPT

## 2024-03-09 RX ORDER — ALBUTEROL 90 UG/1
2 AEROSOL, METERED ORAL ONCE
Refills: 0 | Status: COMPLETED | OUTPATIENT
Start: 2024-03-09 | End: 2024-03-09

## 2024-03-09 RX ORDER — ACETAMINOPHEN 500 MG
650 TABLET ORAL ONCE
Refills: 0 | Status: COMPLETED | OUTPATIENT
Start: 2024-03-09 | End: 2024-03-09

## 2024-03-09 RX ADMIN — Medication 650 MILLIGRAM(S): at 18:16

## 2024-03-09 RX ADMIN — Medication 100 MILLIGRAM(S): at 18:08

## 2024-03-09 RX ADMIN — ALBUTEROL 2 PUFF(S): 90 AEROSOL, METERED ORAL at 18:09

## 2024-03-09 NOTE — ED PROVIDER NOTE - NSFOLLOWUPINSTRUCTIONS_ED_ALL_ED_FT
You were seen for cough.     No signs of emergency medical condition on today's workup.  Your results are attached with your discharge instructions, please review them with your primary care physician. If there is a result pending, you will receive a call if test is positive.    A presumptive diagnosis is made today, but further evaluation may be required by your primary care doctor and/or specialist for a definitive diagnosis. Therefore, follow up as directed and if symptoms change/worsen or any emergency conditions, please return to the ER.    For pain or fever you can ibuprofen (motrin, advil) or tylenol as needed, as directed on packaging.  You can use 500-1000mg Tylenol every 6 hours for pain - as needed.  This is an over-the-counter medications - please respect the warnings on the label. This medication come with certain risks and side effects that you need to discuss with your doctor, especially if you are taking it for a prolonged period.    You can use 400-600mg Ibuprofen (such as motrin or advil) every 6 to 8 hours as needed for pain control.  Take ibuprofen with food or milk to lessen stomach upset.  This is an over-the-counter medication please respect the warnings on the label. All medications come with certain risks and side effects that you need to discuss with your doctor, especially if you are taking them for a prolonged period.      If needed, call patient access services at [phone] to find a primary care doctor, or call at [phone] to make an appointment at the clinic.

## 2024-03-09 NOTE — ED PROVIDER NOTE - CLINICAL SUMMARY MEDICAL DECISION MAKING FREE TEXT BOX
33-year-old male past medical history of intellectual disability, diabetes, asthma, autism coming in with 1 week of cough.  Patient tested positive for flu a week ago.  States the group home had a lives and has multiple people with the flu.  As per the chart from his last visit on March 4 patient was on Tamiflu.  No abdominal pain, vomiting.  Patient is well-appearing.  No distress.  Clear to auscultation bilaterally with very mild expiratory wheeze sporadically.  Abdomen is soft nontender.  Differential diagnosis include but not limited to pneumonia, viral illness. 33-year-old male past medical history of intellectual disability, diabetes, asthma, autism coming in with 1 week of cough.  Patient tested positive for flu a week ago.  States the group home had a lives and has multiple people with the flu.  As per the chart from his last visit on March 4 patient was on Tamiflu.  No abdominal pain, vomiting.  Patient is well-appearing.  No distress.  Clear to auscultation bilaterally with very mild expiratory wheeze sporadically.  Abdomen is soft nontender.  Differential diagnosis include but not limited to pneumonia, viral illness.  PT is here for group home staff member

## 2024-03-09 NOTE — ED PROVIDER NOTE - PATIENT PORTAL LINK FT
You can access the FollowMyHealth Patient Portal offered by [facility] by registering at the following website: [email or web address]. By joining [employer]’s FollowMyHealth portal, you will also be able to view your health information using other applications (apps) compatible with our system.

## 2024-03-09 NOTE — ED ADULT NURSE NOTE - NSFALLUNIVINTERV_ED_ALL_ED
Bed/Stretcher in lowest position, wheels locked, appropriate side rails in place/Call bell, personal items and telephone in reach/Instruct patient to call for assistance before getting out of bed/chair/stretcher/Non-slip footwear applied when patient is off stretcher/[place] to call system/Physically safe environment - no spills, clutter or unnecessary equipment/Purposeful proactive rounding/Room/bathroom lighting operational, light cord in reach

## 2024-03-18 ENCOUNTER — EMERGENCY (EMERGENCY)
Facility: HOSPITAL | Age: 34
LOS: 1 days | Discharge: HOME | End: 2024-03-18
Attending: EMERGENCY MEDICINE
Payer: MEDICAID

## 2024-03-18 VITALS
SYSTOLIC BLOOD PRESSURE: 134 MMHG | HEART RATE: 92 BPM | RESPIRATION RATE: 18 BRPM | DIASTOLIC BLOOD PRESSURE: 89 MMHG | OXYGEN SATURATION: 99 % | HEIGHT: 71 IN | TEMPERATURE: 98 F | WEIGHT: 289.91 LBS

## 2024-03-18 DIAGNOSIS — Z90.79 ACQUIRED ABSENCE OF OTHER GENITAL ORGAN(S): Chronic | ICD-10-CM

## 2024-03-18 LAB
BASE EXCESS BLDV CALC-SCNC: 1.9 MMOL/L — SIGNIFICANT CHANGE UP
BASOPHILS # BLD AUTO: 0.03 K/UL — SIGNIFICANT CHANGE UP (ref 0–0.2)
BASOPHILS NFR BLD AUTO: 0.4 % — SIGNIFICANT CHANGE UP (ref 0–2)
BLOOD GAS COMMENTS, VENOUS: SIGNIFICANT CHANGE UP
CA-I SERPL-SCNC: SIGNIFICANT CHANGE UP MMOL/L (ref 1.15–1.33)
EOSINOPHIL # BLD AUTO: 0.23 K/UL — SIGNIFICANT CHANGE UP (ref 0–0.5)
EOSINOPHIL NFR BLD AUTO: 3 % — SIGNIFICANT CHANGE UP (ref 0–6)
GAS PNL BLDV: 136 MMOL/L — SIGNIFICANT CHANGE UP (ref 136–145)
GAS PNL BLDV: SIGNIFICANT CHANGE UP
HCO3 BLDV-SCNC: 28 MMOL/L — SIGNIFICANT CHANGE UP (ref 22–29)
HCT VFR BLD CALC: 41.2 % — SIGNIFICANT CHANGE UP (ref 39–50)
HGB BLD-MCNC: 13 G/DL — SIGNIFICANT CHANGE UP (ref 13–17)
IMM GRANULOCYTES NFR BLD AUTO: 0.7 % — SIGNIFICANT CHANGE UP (ref 0–0.9)
LACTATE BLDV-MCNC: 3.7 MMOL/L — HIGH (ref 0.5–2)
LYMPHOCYTES # BLD AUTO: 3.07 K/UL — SIGNIFICANT CHANGE UP (ref 1–3.3)
LYMPHOCYTES # BLD AUTO: 40.5 % — SIGNIFICANT CHANGE UP (ref 13–44)
MCHC RBC-ENTMCNC: 25.1 PG — LOW (ref 27–34)
MCHC RBC-ENTMCNC: 31.6 GM/DL — LOW (ref 32–36)
MCV RBC AUTO: 79.5 FL — LOW (ref 80–100)
MONOCYTES # BLD AUTO: 0.52 K/UL — SIGNIFICANT CHANGE UP (ref 0–0.9)
MONOCYTES NFR BLD AUTO: 6.9 % — SIGNIFICANT CHANGE UP (ref 2–14)
NEUTROPHILS # BLD AUTO: 3.68 K/UL — SIGNIFICANT CHANGE UP (ref 1.8–7.4)
NEUTROPHILS NFR BLD AUTO: 48.5 % — SIGNIFICANT CHANGE UP (ref 43–77)
NRBC # BLD: 0 /100 WBCS — SIGNIFICANT CHANGE UP (ref 0–0)
PCO2 BLDV: 50 MMHG — SIGNIFICANT CHANGE UP (ref 42–55)
PH BLDV: 7.36 — SIGNIFICANT CHANGE UP (ref 7.32–7.43)
PLATELET # BLD AUTO: 261 K/UL — SIGNIFICANT CHANGE UP (ref 150–400)
PO2 BLDV: 38 MMHG — SIGNIFICANT CHANGE UP
POTASSIUM BLDV-SCNC: 4.1 MMOL/L — SIGNIFICANT CHANGE UP (ref 3.5–5.1)
RBC # BLD: 5.18 M/UL — SIGNIFICANT CHANGE UP (ref 4.2–5.8)
RBC # FLD: 14.4 % — SIGNIFICANT CHANGE UP (ref 10.3–14.5)
SAO2 % BLDV: 63.6 % — SIGNIFICANT CHANGE UP
WBC # BLD: 7.58 K/UL — SIGNIFICANT CHANGE UP (ref 3.8–10.5)
WBC # FLD AUTO: 7.58 K/UL — SIGNIFICANT CHANGE UP (ref 3.8–10.5)

## 2024-03-18 PROCEDURE — 99285 EMERGENCY DEPT VISIT HI MDM: CPT

## 2024-03-18 RX ORDER — SODIUM CHLORIDE 9 MG/ML
1000 INJECTION INTRAMUSCULAR; INTRAVENOUS; SUBCUTANEOUS ONCE
Refills: 0 | Status: COMPLETED | OUTPATIENT
Start: 2024-03-18 | End: 2024-03-18

## 2024-03-18 NOTE — ED ADULT TRIAGE NOTE - CHIEF COMPLAINT QUOTE
Patient reports headache since yesterday and high blood sugar today. Patient has hx of hyperglycemia, takes metformin, did not take his med this morning. Accucheck was 247.

## 2024-03-19 VITALS
DIASTOLIC BLOOD PRESSURE: 74 MMHG | TEMPERATURE: 98 F | SYSTOLIC BLOOD PRESSURE: 109 MMHG | RESPIRATION RATE: 18 BRPM | HEART RATE: 93 BPM | OXYGEN SATURATION: 97 %

## 2024-03-19 LAB
ACETONE SERPL-MCNC: NEGATIVE — SIGNIFICANT CHANGE UP
ALBUMIN SERPL ELPH-MCNC: 3.5 G/DL — SIGNIFICANT CHANGE UP (ref 3.5–5)
ALP SERPL-CCNC: 111 U/L — SIGNIFICANT CHANGE UP (ref 40–120)
ALT FLD-CCNC: 58 U/L DA — SIGNIFICANT CHANGE UP (ref 10–60)
AMPHET UR-MCNC: NEGATIVE — SIGNIFICANT CHANGE UP
ANION GAP SERPL CALC-SCNC: 5 MMOL/L — SIGNIFICANT CHANGE UP (ref 5–17)
APAP SERPL-MCNC: <2 UG/ML — LOW (ref 10–30)
APPEARANCE UR: CLEAR — SIGNIFICANT CHANGE UP
AST SERPL-CCNC: 19 U/L — SIGNIFICANT CHANGE UP (ref 10–40)
B-OH-BUTYR SERPL-SCNC: 0.2 MMOL/L — SIGNIFICANT CHANGE UP
BARBITURATES UR SCN-MCNC: NEGATIVE — SIGNIFICANT CHANGE UP
BENZODIAZ UR-MCNC: NEGATIVE — SIGNIFICANT CHANGE UP
BILIRUB SERPL-MCNC: 0.3 MG/DL — SIGNIFICANT CHANGE UP (ref 0.2–1.2)
BILIRUB UR-MCNC: NEGATIVE — SIGNIFICANT CHANGE UP
BUN SERPL-MCNC: 10 MG/DL — SIGNIFICANT CHANGE UP (ref 7–18)
CALCIUM SERPL-MCNC: 9.5 MG/DL — SIGNIFICANT CHANGE UP (ref 8.4–10.5)
CHLORIDE SERPL-SCNC: 103 MMOL/L — SIGNIFICANT CHANGE UP (ref 96–108)
CO2 SERPL-SCNC: 29 MMOL/L — SIGNIFICANT CHANGE UP (ref 22–31)
COCAINE METAB.OTHER UR-MCNC: NEGATIVE — SIGNIFICANT CHANGE UP
COLOR SPEC: YELLOW — SIGNIFICANT CHANGE UP
CREAT SERPL-MCNC: 0.85 MG/DL — SIGNIFICANT CHANGE UP (ref 0.5–1.3)
DIFF PNL FLD: NEGATIVE — SIGNIFICANT CHANGE UP
EGFR: 117 ML/MIN/1.73M2 — SIGNIFICANT CHANGE UP
ETHANOL SERPL-MCNC: 4 MG/DL — SIGNIFICANT CHANGE UP (ref 0–10)
FLUAV AG NPH QL: SIGNIFICANT CHANGE UP
FLUBV AG NPH QL: SIGNIFICANT CHANGE UP
GLUCOSE SERPL-MCNC: 139 MG/DL — HIGH (ref 70–99)
GLUCOSE UR QL: NEGATIVE MG/DL — SIGNIFICANT CHANGE UP
HIV 1+2 AB+HIV1 P24 AG SERPL QL IA: SIGNIFICANT CHANGE UP
KETONES UR-MCNC: ABNORMAL MG/DL
LACTATE SERPL-SCNC: 3.6 MMOL/L — HIGH (ref 0.7–2)
LACTATE SERPL-SCNC: 4.4 MMOL/L — CRITICAL HIGH (ref 0.7–2)
LEUKOCYTE ESTERASE UR-ACNC: NEGATIVE — SIGNIFICANT CHANGE UP
MAGNESIUM SERPL-MCNC: 1.4 MG/DL — LOW (ref 1.6–2.6)
METHADONE UR-MCNC: NEGATIVE — SIGNIFICANT CHANGE UP
NITRITE UR-MCNC: NEGATIVE — SIGNIFICANT CHANGE UP
OPIATES UR-MCNC: NEGATIVE — SIGNIFICANT CHANGE UP
PCP SPEC-MCNC: SIGNIFICANT CHANGE UP
PCP UR-MCNC: NEGATIVE — SIGNIFICANT CHANGE UP
PH UR: 6 — SIGNIFICANT CHANGE UP (ref 5–8)
PHOSPHATE SERPL-MCNC: 4.5 MG/DL — SIGNIFICANT CHANGE UP (ref 2.5–4.5)
POTASSIUM SERPL-MCNC: 3.8 MMOL/L — SIGNIFICANT CHANGE UP (ref 3.5–5.3)
POTASSIUM SERPL-SCNC: 3.8 MMOL/L — SIGNIFICANT CHANGE UP (ref 3.5–5.3)
PROT SERPL-MCNC: 7.3 G/DL — SIGNIFICANT CHANGE UP (ref 6–8.3)
PROT UR-MCNC: NEGATIVE MG/DL — SIGNIFICANT CHANGE UP
SALICYLATES SERPL-MCNC: <1.7 MG/DL — LOW (ref 2.8–20)
SARS-COV-2 RNA SPEC QL NAA+PROBE: SIGNIFICANT CHANGE UP
SODIUM SERPL-SCNC: 137 MMOL/L — SIGNIFICANT CHANGE UP (ref 135–145)
SP GR SPEC: 1.01 — SIGNIFICANT CHANGE UP (ref 1–1.03)
THC UR QL: NEGATIVE — SIGNIFICANT CHANGE UP
UROBILINOGEN FLD QL: 0.2 MG/DL — SIGNIFICANT CHANGE UP (ref 0.2–1)

## 2024-03-19 PROCEDURE — 82803 BLOOD GASES ANY COMBINATION: CPT

## 2024-03-19 PROCEDURE — 87637 SARSCOV2&INF A&B&RSV AMP PRB: CPT

## 2024-03-19 PROCEDURE — 36415 COLL VENOUS BLD VENIPUNCTURE: CPT

## 2024-03-19 PROCEDURE — 80307 DRUG TEST PRSMV CHEM ANLYZR: CPT

## 2024-03-19 PROCEDURE — 82330 ASSAY OF CALCIUM: CPT

## 2024-03-19 PROCEDURE — 84132 ASSAY OF SERUM POTASSIUM: CPT

## 2024-03-19 PROCEDURE — 90792 PSYCH DIAG EVAL W/MED SRVCS: CPT | Mod: 95

## 2024-03-19 PROCEDURE — 82962 GLUCOSE BLOOD TEST: CPT

## 2024-03-19 PROCEDURE — 82009 KETONE BODYS QUAL: CPT

## 2024-03-19 PROCEDURE — 87389 HIV-1 AG W/HIV-1&-2 AB AG IA: CPT

## 2024-03-19 PROCEDURE — 81003 URINALYSIS AUTO W/O SCOPE: CPT

## 2024-03-19 PROCEDURE — 80053 COMPREHEN METABOLIC PANEL: CPT

## 2024-03-19 PROCEDURE — 83605 ASSAY OF LACTIC ACID: CPT

## 2024-03-19 PROCEDURE — 83735 ASSAY OF MAGNESIUM: CPT

## 2024-03-19 PROCEDURE — 85025 COMPLETE CBC W/AUTO DIFF WBC: CPT

## 2024-03-19 PROCEDURE — 99284 EMERGENCY DEPT VISIT MOD MDM: CPT

## 2024-03-19 PROCEDURE — 84100 ASSAY OF PHOSPHORUS: CPT

## 2024-03-19 PROCEDURE — 84295 ASSAY OF SERUM SODIUM: CPT

## 2024-03-19 PROCEDURE — 82010 KETONE BODYS QUAN: CPT

## 2024-03-19 RX ORDER — SODIUM CHLORIDE 9 MG/ML
500 INJECTION INTRAMUSCULAR; INTRAVENOUS; SUBCUTANEOUS ONCE
Refills: 0 | Status: COMPLETED | OUTPATIENT
Start: 2024-03-19 | End: 2024-03-19

## 2024-03-19 RX ORDER — MAGNESIUM OXIDE 400 MG ORAL TABLET 241.3 MG
400 TABLET ORAL ONCE
Refills: 0 | Status: COMPLETED | OUTPATIENT
Start: 2024-03-19 | End: 2024-03-19

## 2024-03-19 RX ADMIN — SODIUM CHLORIDE 1000 MILLILITER(S): 9 INJECTION INTRAMUSCULAR; INTRAVENOUS; SUBCUTANEOUS at 01:52

## 2024-03-19 RX ADMIN — SODIUM CHLORIDE 2000 MILLILITER(S): 9 INJECTION INTRAMUSCULAR; INTRAVENOUS; SUBCUTANEOUS at 01:51

## 2024-03-19 NOTE — ED PROVIDER NOTE - NS ED ROS FT
Constitutional: (-) fever (-) chills  HENT: (-) congestion (-) rhinorrhea (-) sore throat  Eyes: (-) pain (-) redness  Respiratory: (-) cough (-) shortness of breath (-) wheezing (-) stridor    Cardiovascular: (-) chest pain (-) palpitations (-) leg swelling  Gastrointestinal: (-) abdominal pain (-) blood in stool (no melena/hematochezia) (-) diarrhea (-) vomiting  Genitourinary: (-) dysuria (-) hematuria  Musculoskeletal: (-) gait problem (-) joint swelling (-) myalgias  Skin: (-) color change (-) rash  Neurological: (-) weakness (-) numbness (-) headaches  Psychiatric/Behavioral: (-) confusion (+) SI (+) Auditory hallucinations

## 2024-03-19 NOTE — ED PROVIDER NOTE - CLINICAL SUMMARY MEDICAL DECISION MAKING FREE TEXT BOX
33 male with hx of testicular cancer s/p orchiectomy, DM, HTN, HLD, BPH, GERD, asthma, autism, intellectual disability, HAVEN, ADHD, mood disorder NOS, multiple prior psychiatric admissions.   Pt presenting to the ED reporting multiple complaints.  1) patient reporting elevated blood glucose 250 in a group home earlier today  2) patient reporting self-harm thoughts and hearing voices telling him to hurt himself without specific plan.    Vitals stable.  Nontoxic appearing, n/v intact.  Airway intact, no respiratory distress, no hypoxia.  No abdominal or CVA tenderness.    Plan to obtain:    -Labs R/O DKA, IV fluids, sedation as needed for safety, enhanced observation, telepsych consult, observe/reassess    Lab values with elevated lactate on blood gas.  pH okay.  Glucose 139.  AG 5.  No evidence of DKA at this time.  Will repeat lactate after IV fluids.    Telepsych consulted.  IV fluids being given.    Repeat lactate & completion of telepsych consult pending at time of signout.

## 2024-03-19 NOTE — ED PROVIDER NOTE - PROGRESS NOTE DETAILS
TelePsych consult aware Thomas LEMONS: Patient was signed out to me by prior team pending telepsych recommendations.  I do spoke with telepsych attending, who states that patient is cleared for discharge back to group home.  Patient not candidate for inpatient psychiatric admission at this time. Thomas LEMONS: Patient was signed out to me by prior team pending telepsych recommendations.  I do spoke with telepsych attending, who states that patient is cleared for discharge back to group home.  Patient not candidate for inpatient psychiatric admission at this time. Lactate, will give IVF and send rpt prior to reassessment/dc Thomas LEMONS: pt waiting for urine to be sent as well as repeat lactate. Asked nursing staff to send again Thomas LEMONS:  Downtrending lactate, will discharge.  Patient reassessed at bedside, feeling well overall, vital signs stable.

## 2024-03-19 NOTE — ED PROVIDER NOTE - OBJECTIVE STATEMENT
33 male with hx of testicular cancer s/p orchiectomy, DM, HTN, HLD, BPH, GERD, asthma, autism, intellectual disability, HAVEN, ADHD, mood disorder NOS, multiple prior psychiatric admissions.   Pt presenting to the ED reporting multiple complaints.  1) patient reporting elevated blood glucose 250 in a group home earlier today  2) patient reporting self-harm thoughts and hearing voices telling him to hurt himself without specific plan.

## 2024-03-19 NOTE — ED PROVIDER NOTE - COVID-19 ORDERING FACILITY
[facility]  [facility]

## 2024-03-19 NOTE — ED PROVIDER NOTE - PATIENT PORTAL LINK FT
You can access the FollowMyHealth Patient Portal offered by [facility] by registering at the following website: [email or web address]. By joining [employer]’s FollowMyHealth portal, you will also be able to view your health information using other applications (apps) compatible with our system.

## 2024-03-19 NOTE — ED BEHAVIORAL HEALTH NOTE - BEHAVIORAL HEALTH NOTE
LMSW spoke to group home DSP for collateral via [phone]. Collateral reports that he works alongside pt 5 days per week. Per collateral, pt's presentation today is consistent with baseline functioning and is described to be chronic. Collateral states that pt frequently self-activates 911 reporting that he is hearing voices. Collateral reports that pt has been adherent to medications however beodya not know the list offhand. Collateral states he believes that pt is safe to be discharged as pt will be assigned to a 1:1 and pt's chronic risk is elevated given ASD and ID. Collateral denies reports of SI/HI. Obtained collateral d/w attending.

## 2024-03-19 NOTE — ED BEHAVIORAL HEALTH ASSESSMENT NOTE - HPI (INCLUDE ILLNESS QUALITY, SEVERITY, DURATION, TIMING, CONTEXT, MODIFYING FACTORS, ASSOCIATED SIGNS AND SYMPTOMS)
Pt is a 34 yo male with PPhx of ASD, ID, psychosis/bipolar who was BIB EMS for SI and AH.     Pt was a very limited historian. He was very rigid in his conversation. He stated he came from group home and called EMS because he was "suicidal and hearing voices." He was adamant in his interest of wanting to get admitted on a psych unit. He stated he is not happy because they "call my names and staff is bothering me." He reported he is also hearing voices to hurt self. He stated his SI and AH are going on from a month. When asked why did he wait for a month to seek help, he did not answer and asked to admit him. He reported he will wait in ED if there are no bed available. He stated he refused his medications from 3 days and added that he will accept the meds on inpatient unit. His behavior was child like and rigid without offering more information about his symptoms. He kept insisting on admission without offering much information.

## 2024-03-19 NOTE — ED BEHAVIORAL HEALTH ASSESSMENT NOTE - SUMMARY
32 yo male with PPhx of ASD, ID, psychosis/bipolar who was BIB EMS for SI and AH.     Patient's presentation is consistent with neurodevelopmental DO and ID given his rigid thoughts with lack of rationale thinking abilities. He was adamant about wanting to be admitted and did not offer much insight into the questions about his complains of AH and SI. He reported some issues with staff at [facility] that led to him being upset and could be a motivation for admission. Further collateral from [facility] revealed that patient has been to ED with similar complains before and here are no acute concerns with him at . Hence he will be discharged.

## 2024-03-19 NOTE — ED PROVIDER NOTE - NSICDXPASTMEDICALHX_GEN_ALL_CORE_FT
PAST MEDICAL HISTORY:  Asthma     Autism     Enuresis     Factitious disorder     GERD (gastroesophageal reflux disease)     History of BPH     HLD (hyperlipidemia)     Hypothyroid     Impulse control disorder     Intellectual disability     Myopia of both eyes     Obesity     Testicular cancer     Type 2 diabetes mellitus     Urinary retention     
Yes

## 2024-03-19 NOTE — ED PROVIDER NOTE - NSFOLLOWUPINSTRUCTIONS_ED_ALL_ED_FT
– Return for any worsening or concerning symptoms (see below).  – Follow up with primary care doctor in the next 5 to 7 days.   – Stay hydrated, drink plenty of fluids as well as Gatorade and Pedialyte.   Take your diabetes medication as prescribed.  – Please return for thoughts of wanting to harm or kill yourself or anyone else.  – Please return for hallucinations or hearing voices that make you feel unsafe or tell you do hurt yourself or anyone else.  –  Please return for fever, confusion, not feeling like yourself.

## 2024-03-19 NOTE — ED BEHAVIORAL HEALTH ASSESSMENT NOTE - NICOTINE
None known CT: 1.8 cm hypoattenuating right hepatic lesion measuring IV than fluid density and additional low-attenuation lesions too small to accurately characterize.  Follow-up MR Abdomen w/ and w/o contrast    DVT Ppx: Hold pharmacologic prophylaxis due to anemia

## 2024-03-19 NOTE — ED PROVIDER NOTE - PHYSICAL EXAMINATION
Gen:  Awake, alert, NAD, WDWN, NCAT, non-toxic appearing.  Eyes:  PERRL, EOMI, no icterus, normal lids/lashes, normal conjunctivae.  ENT:  External inspection normal, pink/moist membranes.   CV:  S1S2, regular rate and rhythm, no murmur/gallops/rubs, no JVD, 2+ pulses b/l, no edema/cords/homans, warm/well-perfused.  Resp:  Normal respiratory rate/effort, no respiratory distress, normal voice, speaking full sentences, lungs clear to auscultation b/l, no wheezing/rales/rhonchi, no retractions, no stridor.  Abd:  Soft abdomen, no tender/distended/guarding/rebound, no pulsatile mass, no CVA tender.   Musculoskeletal:  N/V intact, FROM all 4 extremities, normal motor tone  Neck:  FROM neck, supple, trachea midline, no meningismus.   Skin:  Color normal for race, warm and dry, no rash.  Neuro:  Oriented x3, CN 2-12 intact (grossly), normal motor (grossly), normal sensory (grossly), normal gait. GCS 15  Psych:  Attention normal. Affect normal. Behavior normal. Judgment normal. +Auditory hallucinations +Self-harm thoughts

## 2024-03-19 NOTE — ED ADULT NURSE NOTE - ED STAT RN HANDOFF DETAILS
Patient discharged back to facility escorted by staff member stable in no acute distress. Patient discharged home as per MD order, IV access removed no redness, swelling or bleeding noted at site. All discharge instructions and F/U visits provided to patient. Patient verbalizes understanding leaving ambulatory in no acute distress.

## 2024-03-21 NOTE — ED ADULT TRIAGE NOTE - ESI TRIAGE ACUITY LEVEL, MLM
4
MEDICATIONS  (STANDING):  aspirin enteric coated 81 milliGRAM(s) Oral daily  atorvastatin 80 milliGRAM(s) Oral at bedtime  clopidogrel Tablet 75 milliGRAM(s) Oral daily  enoxaparin Injectable 40 milliGRAM(s) SubCutaneous every 24 hours  influenza  Vaccine (HIGH DOSE) 0.7 milliLiter(s) IntraMuscular once  lactated ringers. 1000 milliLiter(s) (30 mL/Hr) IV Continuous <Continuous>  lidocaine   4% Patch 1 Patch Transdermal every 24 hours  melatonin 5 milliGRAM(s) Oral at bedtime  nicotine -  14 mG/24Hr(s) Patch 1 Patch Transdermal daily    MEDICATIONS  (PRN):  artificial  tears Solution 1 Drop(s) Both EYES every 2 hours PRN Dry Eyes  hydrALAZINE Injectable 10 milliGRAM(s) IV Push every 4 hours PRN SBP>180  senna 2 Tablet(s) Oral at bedtime PRN Constipation

## 2024-03-25 NOTE — BH CONSULTATION LIAISON ASSESSMENT NOTE - CURRENT ACTIVE IDEATION
The recording was initiated after a verbal consent was obtained from the patient to record this visit for documentation in their clinical record.   Yes

## 2024-03-28 ENCOUNTER — EMERGENCY (EMERGENCY)
Facility: HOSPITAL | Age: 34
LOS: 1 days | Discharge: HOME | End: 2024-03-28
Attending: STUDENT IN AN ORGANIZED HEALTH CARE EDUCATION/TRAINING PROGRAM
Payer: MEDICAID

## 2024-03-28 VITALS
DIASTOLIC BLOOD PRESSURE: 77 MMHG | TEMPERATURE: 98 F | HEART RATE: 92 BPM | OXYGEN SATURATION: 98 % | SYSTOLIC BLOOD PRESSURE: 111 MMHG | RESPIRATION RATE: 19 BRPM

## 2024-03-28 VITALS
HEART RATE: 97 BPM | WEIGHT: 289.03 LBS | DIASTOLIC BLOOD PRESSURE: 78 MMHG | SYSTOLIC BLOOD PRESSURE: 107 MMHG | HEIGHT: 71 IN | TEMPERATURE: 99 F | RESPIRATION RATE: 18 BRPM | OXYGEN SATURATION: 96 %

## 2024-03-28 DIAGNOSIS — Z90.79 ACQUIRED ABSENCE OF OTHER GENITAL ORGAN(S): Chronic | ICD-10-CM

## 2024-03-28 LAB
ALBUMIN SERPL ELPH-MCNC: 3.3 G/DL — LOW (ref 3.5–5)
ALP SERPL-CCNC: 100 U/L — SIGNIFICANT CHANGE UP (ref 40–120)
ALT FLD-CCNC: 99 U/L DA — HIGH (ref 10–60)
ANION GAP SERPL CALC-SCNC: 5 MMOL/L — SIGNIFICANT CHANGE UP (ref 5–17)
AST SERPL-CCNC: 45 U/L — HIGH (ref 10–40)
BASOPHILS # BLD AUTO: 0.02 K/UL — SIGNIFICANT CHANGE UP (ref 0–0.2)
BASOPHILS NFR BLD AUTO: 0.3 % — SIGNIFICANT CHANGE UP (ref 0–2)
BILIRUB SERPL-MCNC: 0.2 MG/DL — SIGNIFICANT CHANGE UP (ref 0.2–1.2)
BUN SERPL-MCNC: 15 MG/DL — SIGNIFICANT CHANGE UP (ref 7–18)
CALCIUM SERPL-MCNC: 9.3 MG/DL — SIGNIFICANT CHANGE UP (ref 8.4–10.5)
CHLORIDE SERPL-SCNC: 106 MMOL/L — SIGNIFICANT CHANGE UP (ref 96–108)
CO2 SERPL-SCNC: 28 MMOL/L — SIGNIFICANT CHANGE UP (ref 22–31)
CREAT SERPL-MCNC: 0.76 MG/DL — SIGNIFICANT CHANGE UP (ref 0.5–1.3)
EGFR: 121 ML/MIN/1.73M2 — SIGNIFICANT CHANGE UP
EOSINOPHIL # BLD AUTO: 0.2 K/UL — SIGNIFICANT CHANGE UP (ref 0–0.5)
EOSINOPHIL NFR BLD AUTO: 3.4 % — SIGNIFICANT CHANGE UP (ref 0–6)
GLUCOSE SERPL-MCNC: 174 MG/DL — HIGH (ref 70–99)
HCT VFR BLD CALC: 38.2 % — LOW (ref 39–50)
HGB BLD-MCNC: 12.2 G/DL — LOW (ref 13–17)
IMM GRANULOCYTES NFR BLD AUTO: 0.5 % — SIGNIFICANT CHANGE UP (ref 0–0.9)
LIDOCAIN IGE QN: 40 U/L — SIGNIFICANT CHANGE UP (ref 13–75)
LYMPHOCYTES # BLD AUTO: 2.41 K/UL — SIGNIFICANT CHANGE UP (ref 1–3.3)
LYMPHOCYTES # BLD AUTO: 41 % — SIGNIFICANT CHANGE UP (ref 13–44)
MCHC RBC-ENTMCNC: 24.9 PG — LOW (ref 27–34)
MCHC RBC-ENTMCNC: 31.9 GM/DL — LOW (ref 32–36)
MCV RBC AUTO: 78.1 FL — LOW (ref 80–100)
MONOCYTES # BLD AUTO: 0.42 K/UL — SIGNIFICANT CHANGE UP (ref 0–0.9)
MONOCYTES NFR BLD AUTO: 7.1 % — SIGNIFICANT CHANGE UP (ref 2–14)
NEUTROPHILS # BLD AUTO: 2.8 K/UL — SIGNIFICANT CHANGE UP (ref 1.8–7.4)
NEUTROPHILS NFR BLD AUTO: 47.7 % — SIGNIFICANT CHANGE UP (ref 43–77)
NRBC # BLD: 0 /100 WBCS — SIGNIFICANT CHANGE UP (ref 0–0)
PLATELET # BLD AUTO: 217 K/UL — SIGNIFICANT CHANGE UP (ref 150–400)
POTASSIUM SERPL-MCNC: 4.5 MMOL/L — SIGNIFICANT CHANGE UP (ref 3.5–5.3)
POTASSIUM SERPL-SCNC: 4.5 MMOL/L — SIGNIFICANT CHANGE UP (ref 3.5–5.3)
PROT SERPL-MCNC: 6.7 G/DL — SIGNIFICANT CHANGE UP (ref 6–8.3)
RBC # BLD: 4.89 M/UL — SIGNIFICANT CHANGE UP (ref 4.2–5.8)
RBC # FLD: 14.6 % — HIGH (ref 10.3–14.5)
SODIUM SERPL-SCNC: 139 MMOL/L — SIGNIFICANT CHANGE UP (ref 135–145)
WBC # BLD: 5.88 K/UL — SIGNIFICANT CHANGE UP (ref 3.8–10.5)
WBC # FLD AUTO: 5.88 K/UL — SIGNIFICANT CHANGE UP (ref 3.8–10.5)

## 2024-03-28 PROCEDURE — 99283 EMERGENCY DEPT VISIT LOW MDM: CPT

## 2024-03-28 PROCEDURE — 36415 COLL VENOUS BLD VENIPUNCTURE: CPT

## 2024-03-28 PROCEDURE — 83690 ASSAY OF LIPASE: CPT

## 2024-03-28 PROCEDURE — 80053 COMPREHEN METABOLIC PANEL: CPT

## 2024-03-28 PROCEDURE — 99284 EMERGENCY DEPT VISIT MOD MDM: CPT

## 2024-03-28 PROCEDURE — 85025 COMPLETE CBC W/AUTO DIFF WBC: CPT

## 2024-03-28 RX ORDER — FAMOTIDINE 10 MG/ML
20 INJECTION INTRAVENOUS ONCE
Refills: 0 | Status: COMPLETED | OUTPATIENT
Start: 2024-03-28 | End: 2024-03-28

## 2024-03-28 RX ORDER — ACETAMINOPHEN 500 MG
975 TABLET ORAL ONCE
Refills: 0 | Status: COMPLETED | OUTPATIENT
Start: 2024-03-28 | End: 2024-03-28

## 2024-03-28 RX ORDER — SODIUM CHLORIDE 9 MG/ML
1000 INJECTION INTRAMUSCULAR; INTRAVENOUS; SUBCUTANEOUS ONCE
Refills: 0 | Status: COMPLETED | OUTPATIENT
Start: 2024-03-28 | End: 2024-03-28

## 2024-03-28 RX ADMIN — FAMOTIDINE 20 MILLIGRAM(S): 10 INJECTION INTRAVENOUS at 15:55

## 2024-03-28 RX ADMIN — Medication 975 MILLIGRAM(S): at 15:55

## 2024-03-28 RX ADMIN — SODIUM CHLORIDE 1000 MILLILITER(S): 9 INJECTION INTRAMUSCULAR; INTRAVENOUS; SUBCUTANEOUS at 15:55

## 2024-03-28 NOTE — ED PROVIDER NOTE - PROGRESS NOTE DETAILS
improved sxs of abd pain. pt requesting DC. UA ordered, although pt requesting to leave prior to complete evaluation. given lower suspicion for UTI (intermittent sxs, none currently), will dc and recommend f/u if recurrent sxs.

## 2024-03-28 NOTE — ED ADULT NURSE NOTE - NSFALLUNIVINTERV_ED_ALL_ED
Bed/Stretcher in lowest position, wheels locked, appropriate side rails in place/Call bell, personal items and telephone in reach/Instruct patient to call for assistance before getting out of bed/chair/stretcher/Non-slip footwear applied when patient is off stretcher/[place] to call system/Physically safe environment - no spills, clutter or unnecessary equipment/Purposeful proactive rounding/Room/bathroom lighting operational, light cord in reach

## 2024-03-28 NOTE — ED PROVIDER NOTE - NSFOLLOWUPINSTRUCTIONS_ED_ALL_ED_FT
--take tylenol or motrin as needed for pain. Take tylenol 1000mg every 6 hours alternating with motrin 600 mg every 6 hours   --today, the lab tests we did include basic blood tests, lipase. results significant for mildly elevated glucose. we have included these test results in your paperwork. please take them to your follow-up appointment  --given that you were in the ED today, we recommend a followup visit with your general doctor (primary care doctor) within 7 days.   --your diagnosis is: abdominal pain.   --return to the ED if burning with urination, if fevers.

## 2024-03-28 NOTE — ED PROVIDER NOTE - PHYSICAL EXAMINATION
Gen: WDWN, NAD  HEENT: EOMI, no nasal discharge, mucous membranes moist  CV: RRR, 2+ radial pulse R  Resp: no accessory muscle use, no increased work of breathing  GI: Abdomen soft non-distended, NTTP  MSK: No open wounds, no bruising, no LE edema  Neuro: following commands, moving all four extremities spontaneously  Psych: appropriate mood

## 2024-03-28 NOTE — ED PROVIDER NOTE - PRO INTERPRETER NEED 2
[facility]        OUTPATIENT PHYSICAL THERAPY FUNCTIONAL EVALUATION  PLAN OF TREATMENT FOR OUTPATIENT REHABILITATION  (COMPLETE FOR INITIAL CLAIMS ONLY)  Patient's Last Name, First Name, M.I.  YOB: 1946  Marilia Bean     Provider's Name   [facility]   Medical Record No.  [id number]     Start of Care Date:  06/15/21   Onset Date:  05/25/21   Type:     _X__PT   ____OT  ____SLP Medical Diagnosis:  Malignant neoplasm of ascending colon (H) C18.2  - Primary  Ductal carcinoma in situ (DCIS) of left breast D05.12      PT Diagnosis:  mild decrease in balance, planned Cancer surgeries.  Visits from SOC:  1                              __________________________________________________________________________________  Plan of Treatment/Functional Goals:  balance training, neuromuscular re-education, strengthening           GOALS  Education  Pt to verbalize understanding of PT role and cancer rehab. Pt will also verbalize and demo understaning of HEP for balance training.   06/15/21                                                                                 Therapy Frequency:  other (see comments)   Predicted Duration of Therapy Intervention:  Evaluation and 1 treatment completed today. Pt will call after surgery if function rosa López, PT                                    I CERTIFY THE NEED FOR THESE SERVICES FURNISHED UNDER        THIS PLAN OF TREATMENT AND WHILE UNDER MY CARE     (Physician co-signature of this document indicates review and certification of the therapy plan).                Certification Date From:  06/15/21   Certification Date To:  09/13/21    Referring Provider:  Alicia Bennett MD    Initial Assessment  See Epic Evaluation- Start of Care Date: 06/15/21            English

## 2024-03-28 NOTE — ED ADULT TRIAGE NOTE - INTERNATIONAL TRAVEL
No
I have personally seen and examined this patient.  I have fully participated in the care of this patient. I have reviewed all pertinent clinical information, including history, physical exam, plan and the Resident’s note and agree except as noted.

## 2024-03-28 NOTE — ED PROVIDER NOTE - PATIENT PORTAL LINK FT
You can access the FollowMyHealth Patient Portal offered by [facility] by registering at the following website: [email or web address]. By joining [facility]’s FollowMyHealth portal, you will also be able to view your health information using other applications (apps) compatible with our system.

## 2024-03-28 NOTE — ED ADULT NURSE NOTE - OBJECTIVE STATEMENT
Patient present to ED with  with c/o  abdominal pain. pain periumbilical. onset 1day prior,  diarrhea. endorses intermittent dyruria for days, although resolved today

## 2024-03-28 NOTE — ED ADULT NURSE NOTE - NS ED NURSE RECORD ANOTHER HT AND WT
Physician Documentation                                                                           

 [facility]                                                                 

Name: Leonila Castaneda                                                                                

Age: 10 yrs                                                                                       

Sex: Female                                                                                       

: 2011                                                                                   

MRN: [id number]                                                                                   

Arrival Date: 2021                                                                          

Time: 15:45                                                                                       

Account#: [id number]                                                                            

Bed Treatment                                                                                     

Private MD:                                                                                       

ED Physician Kannan Howard                                                                      

HPI:                                                                                              

                                                                                             

20:56 This 10 yrs old  Female presents to ER via Ambulatory with complaints of Fever, cp  

      Sore Throat.                                                                                

20:56 The parent or caregiver reports fever, with an emergency department temperature of      cp  

      100.6 degrees Fahrenheit. Onset: The symptoms/episode began/occurred yesterday.             

      Associated signs and symptoms: Pertinent positives: cough, sore throat, Pertinent           

      negatives: diarrhea, vomiting. Mother reports known exposure to COVID-19 by classmate.      

                                                                                                  

Historical:                                                                                       

- Allergies:                                                                                      

16:35 PENICILLINS;                                                                            aa5 

                                                                                                  

                                                                                                  

                                                                                                  

ROS:                                                                                              

20:57 Eyes: Negative for injury, pain, redness, and discharge.                                cp  

20:57 Constitutional: Positive for fever, Negative for poor PO intake.                            

20:57 ENT: Positive for sore throat, Negative for drainage from ear(s), ear pain, difficulty      

      swallowing, difficulty handling secretions.                                                 

20:57 Respiratory: Positive for cough, Negative for shortness of breath, wheezing.                

20:57 Abdomen/GI: Negative for abdominal pain, vomiting, diarrhea, constipation.                  

20:57 Skin: Negative for rash.                                                                    

20:57 Neuro: Positive for headache, Negative for altered mental status, weakness.                 

20:57 All other systems are negative.                                                             

                                                                                                  

Exam:                                                                                             

20:58 Head/Face:  Normocephalic, atraumatic.                                                  cp  

20:58 Constitutional: The patient appears in no acute distress, alert, awake, non-toxic, well     

      developed, well nourished, febrile.                                                         

20:58 Eyes: Periorbital structures: appear normal, Conjunctiva: normal, no exudate, no            

      injection, Lids and lashes: appear normal, bilaterally.                                     

20:58 ENT: External ear(s): are unremarkable, Nose: is normal, Mouth: Lips: moist, Oral           

      mucosa: moist, Posterior pharynx: Airway: no evidence of obstruction, patent, Tonsils:      

      with erythema, no enlargement, no exudate, erythema, that is moderate, exudate, is not      

      appreciated.                                                                                

20:58 Neck: ROM/movement: is normal, is supple, no meningismus, no nuchal rigidity.               

20:58 Chest/axilla: Inspection: normal.                                                           

20:58 Cardiovascular: Rate: tachycardic, Rhythm: regular.                                         

20:58 Respiratory: the patient does not display signs of respiratory distress,  Respirations:     

      normal, no use of accessory muscles, no retractions, labored breathing, is not present,     

      Breath sounds: are clear throughout, no decreased breath sounds, no stridor, no             

      wheezing.                                                                                   

20:58 Abdomen/GI: Exam negative for discomfort, distension, guarding, Inspection: abdomen         

      appears normal.                                                                             

                                                                                                  

Vital Signs:                                                                                      

16:33  / 99; Pulse 130; Resp 22 S; Temp 100.6(O); Pulse Ox 99% on R/A; Weight 38.73 kg  aa5 

      (M);                                                                                        

21:09 Pulse 106; Resp 20 S; Temp 98.4(O); Pulse Ox 98% on R/A;                                bb  

                                                                                                  

MDM:                                                                                              

21:00 Differential diagnosis: viral Infection, bacterial infection, bronchitis, pneumonia.    cp  

      Data reviewed: vital signs, nurses notes, lab test result(s).                               

21:01 Patient medically screened.                                                               

                                                                                                  

                                                                                             

16:33 Order name: Strep; Complete Time: 20:47                                                 aa5 

                                                                                             

17:27 Order name: Throat Culture                                                              EDMS

                                                                                             

20:33 Order name: SARS-COV-2 RT PCR; Complete Time: 20:47                                     EDMS

                                                                                             

21:13 Interpretation: SARSCOV2 RT PCR POSITIVE.                                                 

/                                                                                             

20:47 Order name: Vital Signs: please update to include temp; Complete Time: 21:12            cp  

                                                                                                  

Administered Medications:                                                                         

16:35 Drug: Motrin (ibuprofen) Suspension 10 mg/kg Route: PO;                                 aa5 

21:16 Follow up: Response: Temperature is decreased                                           bb  

                                                                                                  

                                                                                                  

Disposition:                                                                                      

                                                                                             

06:51 Co-signature as Attending Physician, Kannan Howard MD.                                 mh7 

                                                                                                  

Disposition Summary:                                                                              

21 21:12                                                                                    

Discharge Ordered                                                                                 

      Location: Home                                                                          cp  

      Problem: new                                                                            cp  

      Symptoms: have improved                                                                 cp  

      Condition: Stable                                                                       cp  

      Diagnosis                                                                                   

        - SARS-associated coronavirus as the cause of diseases classified elsewhere           cp  

      Followup:                                                                               cp  

        - With: Private Physician                                                                  

        - When: 2 - 3 days                                                                         

        - Reason: Worsening of condition                                                           

      Discharge Instructions:                                                                     

        - Discharge Summary Sheet                                                             cp  

        - Form - Excuse from Work, School, or Physical Activity                               cp  

        - COVID-19                                                                            cp  

        - Things to Know about the COVID-19 Pandemic - [facility]                                    cp  

        - 10 Things You Can Do to Manage Your COVID-19 Symptoms at Home - [facility]                 cp  

        - COVID-19: Quarantine vs. Isolation - [facility]                                            cp  

        - Prevent the Spread of COVID-19 if You Are Sick - [facility]                                cp  

      Forms:                                                                                      

        - Medication Reconciliation Form                                                      cp  

        - Thank You Letter                                                                    cp  

        - Antibiotic Education                                                                cp  

        - Prescription Opioid Use                                                             cp  

        - School release form                                                                 bb  

Signatures:                                                                                       

Dispatcher [employer]                           EDMS                                                 

Milagros Lacy RN                     RN   aa5                                                  

Kan Vasquez PA                         PA   cp                                                   

Kannan Howard MD MD   mh7                                                  

Adriana Arce RN   bb                                                   

                                                                                                  

Corrections: (The following items were deleted from the chart)                                    

                                                                                             

17:54 16:33 CORONAVIRUS+MR.LAB.BRZ ordered. EDMS                                              EDMS

                                                                                                  

**************************************************************************************************
Yes

## 2024-03-28 NOTE — ED ADULT NURSE NOTE - ED STAT RN HANDOFF DETAILS
Patient discharged back to facility as per MD order, IV access removed no redness, swelling or bleeding noted at site. All discharge instructions and F/U visits provided to patient. Patient verbalizes understanding leaving ambulatory in no acute distress provided with [employer]

## 2024-03-28 NOTE — ED PROVIDER NOTE - CLINICAL SUMMARY MEDICAL DECISION MAKING FREE TEXT BOX
33 male with hx of testicular cancer s/p orchiectomy, DM, HTN, HLD, BPH, GERD, asthma, autism, intellectual disability, HAVEN, ADHD, mood disorder NOS, multiple prior psychiatric admissions, pres w/ abd pain and diarrhea. vss, pe nontender abdomen. suspect gastroenteritis, consider UTI although low suspicion given intermittent sxs. plan for basic labs, lipase. update: labs normal, mild hyperglycemia, no concerns for DKA. improved sxs s/p GI cocktail, fluids. labs w/o concern for dehydration. will dc.

## 2024-03-28 NOTE — ED PROVIDER NOTE - OBJECTIVE STATEMENT
33 male with hx of testicular cancer s/p orchiectomy, DM, HTN, HLD, BPH, GERD, asthma, autism, intellectual disability, HAVEN, ADHD, mood disorder NOS, multiple prior psychiatric admissions, pres w/ abd pain. pain periumbilical. onset 1d prior, a/w diarrhea. endorses intermittent dyruria for days, although resolved today. denies fevers, cp, cough, sob, flank pain.

## 2024-03-29 NOTE — ED PROVIDER NOTE - NSICDXPASTMEDICALHX_GEN_ALL_CORE_FT
COPD is worsening.    Plan:  Patient will restart Symbicort and was given an albuterol inhaler for rescue. Although I don't feel she is necessarily in exacerbation today, her respiratory status is certainly worsening and will continue with continued tobacco abuse Rx Prednisone taper.    Discussed medication dosage, use, side effects, and goals of treatment in detail.    Discussed monitoring symptoms and use of quick-relief medications and maintenance medication..    Patient Treatment Goals:   symptom prevention, minimizing limitation in activity, prevention of exacerbations and use of ER/inpatient care, maintenance of optimal pulmonary function, and minimization of adverse effects of treatment    Followup at the next regular appointment.     PAST MEDICAL HISTORY:  Asthma     Autism     Enuresis     Factitious disorder     GERD (gastroesophageal reflux disease)     Hypothyroidism     Intellectual disability     Mood disorder     Myopia of both eyes     Urinary retention

## 2024-04-08 ENCOUNTER — EMERGENCY (EMERGENCY)
Facility: HOSPITAL | Age: 34
LOS: 1 days | Discharge: HOME | End: 2024-04-08
Attending: EMERGENCY MEDICINE
Payer: MEDICAID

## 2024-04-08 VITALS
HEART RATE: 91 BPM | TEMPERATURE: 98 F | SYSTOLIC BLOOD PRESSURE: 138 MMHG | DIASTOLIC BLOOD PRESSURE: 86 MMHG | OXYGEN SATURATION: 98 % | HEIGHT: 71 IN | WEIGHT: 292.99 LBS | RESPIRATION RATE: 18 BRPM

## 2024-04-08 DIAGNOSIS — Z90.79 ACQUIRED ABSENCE OF OTHER GENITAL ORGAN(S): Chronic | ICD-10-CM

## 2024-04-08 LAB
ALBUMIN SERPL ELPH-MCNC: 3.2 G/DL — LOW (ref 3.5–5)
ALP SERPL-CCNC: 106 U/L — SIGNIFICANT CHANGE UP (ref 40–120)
ALT FLD-CCNC: 60 U/L DA — SIGNIFICANT CHANGE UP (ref 10–60)
ANION GAP SERPL CALC-SCNC: 3 MMOL/L — LOW (ref 5–17)
APPEARANCE UR: CLEAR — SIGNIFICANT CHANGE UP
AST SERPL-CCNC: 19 U/L — SIGNIFICANT CHANGE UP (ref 10–40)
BASOPHILS # BLD AUTO: 0.04 K/UL — SIGNIFICANT CHANGE UP (ref 0–0.2)
BASOPHILS NFR BLD AUTO: 0.5 % — SIGNIFICANT CHANGE UP (ref 0–2)
BILIRUB SERPL-MCNC: 0.2 MG/DL — SIGNIFICANT CHANGE UP (ref 0.2–1.2)
BILIRUB UR-MCNC: NEGATIVE — SIGNIFICANT CHANGE UP
BUN SERPL-MCNC: 16 MG/DL — SIGNIFICANT CHANGE UP (ref 7–18)
CALCIUM SERPL-MCNC: 9.1 MG/DL — SIGNIFICANT CHANGE UP (ref 8.4–10.5)
CHLORIDE SERPL-SCNC: 105 MMOL/L — SIGNIFICANT CHANGE UP (ref 96–108)
CO2 SERPL-SCNC: 29 MMOL/L — SIGNIFICANT CHANGE UP (ref 22–31)
COLOR SPEC: YELLOW — SIGNIFICANT CHANGE UP
CREAT SERPL-MCNC: 0.91 MG/DL — SIGNIFICANT CHANGE UP (ref 0.5–1.3)
DIFF PNL FLD: NEGATIVE — SIGNIFICANT CHANGE UP
EGFR: 113 ML/MIN/1.73M2 — SIGNIFICANT CHANGE UP
EOSINOPHIL # BLD AUTO: 0.3 K/UL — SIGNIFICANT CHANGE UP (ref 0–0.5)
EOSINOPHIL NFR BLD AUTO: 3.7 % — SIGNIFICANT CHANGE UP (ref 0–6)
ETHANOL SERPL-MCNC: <3 MG/DL — SIGNIFICANT CHANGE UP (ref 0–10)
GLUCOSE SERPL-MCNC: 195 MG/DL — HIGH (ref 70–99)
GLUCOSE UR QL: NEGATIVE MG/DL — SIGNIFICANT CHANGE UP
HCT VFR BLD CALC: 37.3 % — LOW (ref 39–50)
HGB BLD-MCNC: 11.7 G/DL — LOW (ref 13–17)
IMM GRANULOCYTES NFR BLD AUTO: 0.7 % — SIGNIFICANT CHANGE UP (ref 0–0.9)
KETONES UR-MCNC: 15 MG/DL
LEUKOCYTE ESTERASE UR-ACNC: NEGATIVE — SIGNIFICANT CHANGE UP
LYMPHOCYTES # BLD AUTO: 2.84 K/UL — SIGNIFICANT CHANGE UP (ref 1–3.3)
LYMPHOCYTES # BLD AUTO: 35.4 % — SIGNIFICANT CHANGE UP (ref 13–44)
MCHC RBC-ENTMCNC: 25.1 PG — LOW (ref 27–34)
MCHC RBC-ENTMCNC: 31.4 GM/DL — LOW (ref 32–36)
MCV RBC AUTO: 80 FL — SIGNIFICANT CHANGE UP (ref 80–100)
MONOCYTES # BLD AUTO: 0.55 K/UL — SIGNIFICANT CHANGE UP (ref 0–0.9)
MONOCYTES NFR BLD AUTO: 6.8 % — SIGNIFICANT CHANGE UP (ref 2–14)
NEUTROPHILS # BLD AUTO: 4.24 K/UL — SIGNIFICANT CHANGE UP (ref 1.8–7.4)
NEUTROPHILS NFR BLD AUTO: 52.9 % — SIGNIFICANT CHANGE UP (ref 43–77)
NITRITE UR-MCNC: NEGATIVE — SIGNIFICANT CHANGE UP
NRBC # BLD: 0 /100 WBCS — SIGNIFICANT CHANGE UP (ref 0–0)
PH UR: 6 — SIGNIFICANT CHANGE UP (ref 5–8)
PLATELET # BLD AUTO: 206 K/UL — SIGNIFICANT CHANGE UP (ref 150–400)
POTASSIUM SERPL-MCNC: 4.4 MMOL/L — SIGNIFICANT CHANGE UP (ref 3.5–5.3)
POTASSIUM SERPL-SCNC: 4.4 MMOL/L — SIGNIFICANT CHANGE UP (ref 3.5–5.3)
PROT SERPL-MCNC: 6.3 G/DL — SIGNIFICANT CHANGE UP (ref 6–8.3)
PROT UR-MCNC: NEGATIVE MG/DL — SIGNIFICANT CHANGE UP
RBC # BLD: 4.66 M/UL — SIGNIFICANT CHANGE UP (ref 4.2–5.8)
RBC # FLD: 14.6 % — HIGH (ref 10.3–14.5)
SODIUM SERPL-SCNC: 137 MMOL/L — SIGNIFICANT CHANGE UP (ref 135–145)
SP GR SPEC: 1.02 — SIGNIFICANT CHANGE UP (ref 1–1.03)
UROBILINOGEN FLD QL: 1 MG/DL — SIGNIFICANT CHANGE UP (ref 0.2–1)
WBC # BLD: 8.03 K/UL — SIGNIFICANT CHANGE UP (ref 3.8–10.5)
WBC # FLD AUTO: 8.03 K/UL — SIGNIFICANT CHANGE UP (ref 3.8–10.5)

## 2024-04-08 PROCEDURE — 85025 COMPLETE CBC W/AUTO DIFF WBC: CPT

## 2024-04-08 PROCEDURE — 99285 EMERGENCY DEPT VISIT HI MDM: CPT | Mod: 25

## 2024-04-08 PROCEDURE — 80307 DRUG TEST PRSMV CHEM ANLYZR: CPT

## 2024-04-08 PROCEDURE — 36415 COLL VENOUS BLD VENIPUNCTURE: CPT

## 2024-04-08 PROCEDURE — 81003 URINALYSIS AUTO W/O SCOPE: CPT

## 2024-04-08 PROCEDURE — 80053 COMPREHEN METABOLIC PANEL: CPT

## 2024-04-08 PROCEDURE — 99284 EMERGENCY DEPT VISIT MOD MDM: CPT

## 2024-04-08 PROCEDURE — 87086 URINE CULTURE/COLONY COUNT: CPT

## 2024-04-08 NOTE — ED PROVIDER NOTE - CLINICAL SUMMARY MEDICAL DECISION MAKING FREE TEXT BOX
Patient with complaint of pain in the groin area.  Exam unremarkable.  Patient initially placed on one-to-one until interview with me.  Patient denies suicidal ideation or any thoughts of self-harm or hurting others or hearing voices etc. one-to-one was discontinued patient does not need acute psychiatric evaluation at this time.  Will release back to his facility.

## 2024-04-08 NOTE — ED ADULT NURSE NOTE - NSFALLRISKASMTTYPE_ED_ALL_ED
Anesthesia Pre Eval Note    Anesthesia ROS/Med Hx    Overall Review:  EKG was reviewed and Echo was reviewed     Anesthetic Complication History:  Patient does not have a history of anesthetic complications      Pulmonary Review:  Patient does not have a pulmonary history      Neuro/Psych Review:    Positive for TIA  Positive for CVA (20 years ago)    Cardiovascular Review:  Exercise tolerance: good (>4 METS)AICD (device check 12/3/19) /Pacemaker implanted.  Device Type: Pacemaker Only    Positive for dysrhythmias (tachy-prasanth syndrome) - Chronic A-fib  Positive for hypertension  Positive for hyperlipidemia    GI/HEPATIC/RENAL Review:    Positive for renal disease    End/Other Review:  Patient does not have an endo/other history        Relevant Problems   No relevant active problems       Physical Exam     Airway   Mallampati: III  TM Distance: >3 FB  Neck ROM: Full  TMJ Mobility: Good    Cardiovascular  Cardiovascular exam normal    Head Assessment  Head assessment: Normocephalic and Atraumatic    General Assessment  General Assessment: Alert and oriented and No acute distress    Dental Exam  Dental exam normal  Dental Note: Upper partial    Pulmonary Exam  Pulmonary exam normal    Abdominal Exam  Abdominal exam normal      Anesthesia Plan  No phone call attempted due to:  ASA Status: 3    Anesthesia Type: MAC    Premedication: None      Risks Discussed: Aspiration and Intra-operative Awareness  Checklist  Reviewed: NPO Status, Past Med History, Problem list, Medications and Allergies    Informed Consent  The proposed anesthetic plan, including its risks and benefits, have been discussed with the Patient  - along with the risks and benefits of alternatives.  Questions were encouraged and answered and the patient and/or representative understands and agrees to proceed.        Initial (On Arrival)

## 2024-04-08 NOTE — ED PROVIDER NOTE - PHYSICAL EXAMINATION
Heart regular lungs clear abdomen soft nontender no erythema tenderness to palpation.  Scrotum is empty skin is clear and intact.  No tenderness in the groin area.

## 2024-04-08 NOTE — ED PROVIDER NOTE - NSFOLLOWUPINSTRUCTIONS_ED_ALL_ED_FT
Groin Pain    WHAT YOU NEED TO KNOW:    Groin pain may be felt only in your groin, or it may spread to your buttocks, thigh, or knee. An injury to your hip joint, pelvic area, lower back, or thighs can cause groin pain.    DISCHARGE INSTRUCTIONS:    Medicines: You may need any of the following:    NSAIDs, such as ibuprofen, help decrease swelling, pain, and fever. This medicine is available with or without a doctor's order. NSAIDs can cause stomach bleeding or kidney problems in certain people. If you take blood thinner medicine, always ask if NSAIDs are safe for you. Always read the medicine label and follow directions. Do not give these medicines to children younger than 6 months without direction from a healthcare provider.    Acetaminophen decreases pain. It is available without a doctor's order. Ask how much to take and how often to take it. Follow directions. Acetaminophen can cause liver damage if not taken correctly.    Take your medicine as directed. Contact your healthcare provider if you think your medicine is not helping or if you have side effects. Tell your provider if you are allergic to any medicine. Keep a list of the medicines, vitamins, and herbs you take. Include the amounts, and when and why you take them. Bring the list or the pill bottles to follow-up visits. Carry your medicine list with you in case of an emergency.  Follow up with your healthcare provider as directed: You may need to return for more tests. Write down your questions so you remember to ask them during your visits.    Self-care:    Rest as much as possible. Avoid activities that cause or increase your pain.    Apply ice on your groin for 15 to 20 minutes every hour or as directed. Use an ice pack, or put crushed ice in a plastic bag. Cover it with a towel. Ice helps prevent tissue damage and decreases swelling and pain.    Apply heat on your groin for 20 to 30 minutes every 2 hours for as many days as directed. Heat helps decrease pain and muscle spasms.  Contact your healthcare provider if:    You have a fever.    You have questions or concerns about your condition or care.  Return to the emergency department if:    You have severe pain even after you take medicine.    You have pain or burning when you urinate.    You have pain on your side that spreads to your groin.

## 2024-04-08 NOTE — ED PROVIDER NOTE - PATIENT PORTAL LINK FT
You can access the FollowMyHealth Patient Portal offered by [facility] by registering at the following website: [email or web address]. By joining [facility]’s FollowMyHealth portal, you will also be able to view your health information using other applications (apps) compatible with our system.

## 2024-04-08 NOTE — ED PROVIDER NOTE - OBJECTIVE STATEMENT
33-year-old male from group home presents with complaint of groin pain.  Patient states before he was feeling some transient suicidal ideation because he was frustrated that he can get his paycheck but states he is not feeling suicidal does not feel like he needs to see psychiatrist at this time.  Patient states he is feeling ready to go home denies any nausea vomiting diarrhea dysuria.

## 2024-04-08 NOTE — ED ADULT NURSE NOTE - OBJECTIVE STATEMENT
Pt reports Suicidal Ideation. Reports attempting to hurt himself using keys. Reports refusing psych med x 10 days. C/o testicular pain x 1 month.

## 2024-04-08 NOTE — ED ADULT NURSE NOTE - ED STAT RN HANDOFF DETAILS
Pt cleared for discharge. Denies any suicidal ideation. Left ED accompanied by staff from [facility].

## 2024-04-09 ENCOUNTER — EMERGENCY (EMERGENCY)
Facility: HOSPITAL | Age: 34
LOS: 1 days | Discharge: HOME | End: 2024-04-09
Attending: STUDENT IN AN ORGANIZED HEALTH CARE EDUCATION/TRAINING PROGRAM
Payer: MEDICAID

## 2024-04-09 VITALS
RESPIRATION RATE: 19 BRPM | HEIGHT: 71 IN | SYSTOLIC BLOOD PRESSURE: 116 MMHG | HEART RATE: 98 BPM | DIASTOLIC BLOOD PRESSURE: 81 MMHG | TEMPERATURE: 98 F | OXYGEN SATURATION: 96 %

## 2024-04-09 DIAGNOSIS — Z90.79 ACQUIRED ABSENCE OF OTHER GENITAL ORGAN(S): Chronic | ICD-10-CM

## 2024-04-09 PROCEDURE — 73610 X-RAY EXAM OF ANKLE: CPT

## 2024-04-09 PROCEDURE — 73610 X-RAY EXAM OF ANKLE: CPT | Mod: 26,RT

## 2024-04-09 PROCEDURE — 73110 X-RAY EXAM OF WRIST: CPT

## 2024-04-09 PROCEDURE — 99284 EMERGENCY DEPT VISIT MOD MDM: CPT

## 2024-04-09 PROCEDURE — 73630 X-RAY EXAM OF FOOT: CPT | Mod: 26,RT

## 2024-04-09 PROCEDURE — 73110 X-RAY EXAM OF WRIST: CPT | Mod: 26,RT

## 2024-04-09 PROCEDURE — 73630 X-RAY EXAM OF FOOT: CPT

## 2024-04-09 PROCEDURE — 99284 EMERGENCY DEPT VISIT MOD MDM: CPT | Mod: 25

## 2024-04-09 RX ORDER — ACETAMINOPHEN 500 MG
650 TABLET ORAL ONCE
Refills: 0 | Status: COMPLETED | OUTPATIENT
Start: 2024-04-09 | End: 2024-04-09

## 2024-04-09 RX ORDER — IBUPROFEN 200 MG
600 TABLET ORAL ONCE
Refills: 0 | Status: COMPLETED | OUTPATIENT
Start: 2024-04-09 | End: 2024-04-09

## 2024-04-09 RX ADMIN — Medication 600 MILLIGRAM(S): at 20:34

## 2024-04-09 RX ADMIN — Medication 600 MILLIGRAM(S): at 23:12

## 2024-04-09 RX ADMIN — Medication 650 MILLIGRAM(S): at 20:34

## 2024-04-09 RX ADMIN — Medication 650 MILLIGRAM(S): at 23:12

## 2024-04-09 NOTE — ED PROVIDER NOTE - WR INTERPRETATION DATE TIME  2
spoke with patient about 6/1 appt being canceled due to 1971 J[address] Dorantes no longer being at facility and rescheduled patiet for 6/2 @740 instead.
09-Apr-2024 22:45

## 2024-04-09 NOTE — ED PROVIDER NOTE - PATIENT PORTAL LINK FT
You can access the FollowMyHealth Patient Portal offered by [facility] by registering at the following website: [email or web address]. By joining [employer]’s FollowMyHealth portal, you will also be able to view your health information using other applications (apps) compatible with our system.

## 2024-04-09 NOTE — ED PROVIDER NOTE - PHYSICAL EXAMINATION
Mild tenderness to right lateral ankle and swelling cap refill of distal extremity under 2 seconds sensation and range of motion intact ambulatory

## 2024-04-09 NOTE — ED PROVIDER NOTE - OBJECTIVE STATEMENT
33-year-old presenting status post mechanical fall states pain to right wrist and right ankle denies other areas of pain or injury endorses feeling otherwise well

## 2024-04-09 NOTE — ED ADULT NURSE NOTE - OBJECTIVE STATEMENT
Pt came in complain of fall, denies hitting his head. Denies LOC. Pt denies being blood thinners Denies SOB, chest pain, weakness. R ankle swollen, pain in R wrisrt, ankle.

## 2024-04-09 NOTE — ED ADULT NURSE NOTE - NSFALLRISKINTERV_ED_ALL_ED

## 2024-04-09 NOTE — ED PROVIDER NOTE - CLINICAL SUMMARY MEDICAL DECISION MAKING FREE TEXT BOX
Patient presenting mechanical injury neuro intact vascular intact x-ray performed no fracture noted  Return precaution instructed follow-up PMD  Stable on discharge well-appearing

## 2024-04-10 ENCOUNTER — EMERGENCY (EMERGENCY)
Facility: HOSPITAL | Age: 34
LOS: 1 days | Discharge: LEFT BEFORE DISCHARGE | End: 2024-04-10
Payer: MEDICAID

## 2024-04-10 VITALS
HEIGHT: 71 IN | WEIGHT: 279.99 LBS | SYSTOLIC BLOOD PRESSURE: 103 MMHG | HEART RATE: 91 BPM | OXYGEN SATURATION: 95 % | RESPIRATION RATE: 18 BRPM | TEMPERATURE: 98 F | DIASTOLIC BLOOD PRESSURE: 79 MMHG

## 2024-04-10 DIAGNOSIS — Z90.79 ACQUIRED ABSENCE OF OTHER GENITAL ORGAN(S): Chronic | ICD-10-CM

## 2024-04-10 LAB
CULTURE RESULTS: SIGNIFICANT CHANGE UP
SPECIMEN SOURCE: SIGNIFICANT CHANGE UP

## 2024-04-10 PROCEDURE — L9992: CPT
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
## 2024-04-16 NOTE — ED ADULT NURSE NOTE - NSSEPSISSUSPECTED_ED_A_ED
[facility]    2024 1:19 PM    Assessment/Plan:    Patient presents today with subtherapeutic  INR.    Assessment of patient findings and chart review: Patient reports taking a lower weekly dose of coumadin than prescribed. The importance of compliance with recommended dosing schedules will be emphasized.     Recommendation for patient's warfarin regimen: Increase maintenance dose    Recommend repeat INR in 1 week  _________________________________________________________________    Coby Atkinson (78 y.o.) is followed by the [facility].    Anticoagulation Summary  As of 2024      INR goal:  2.0-3.0   TTR:  31.5% (6.3 y)   INR used for dosin.7 (2024)   Warfarin maintenance plan:  1 mg (1 mg x 1) every Mon; 0.5 mg (1 mg x 0.5) all other days   Weekly warfarin total:  4 mg   Plan last modified:  Josefina De Leon, PharmD (2024)   Next INR check:     Target end date:      Indications    History of pulmonary embolus (PE)--2017 [Z86.711]  Chronic anticoagulation [Z79.01]  Chronic deep vein thrombosis (DVT) of left popliteal vein [I82.532]  Chronic pulmonary embolism without acute cor pulmonale [I27.82]                 Anticoagulation Episode Summary       INR check location:  Clinic Lab    Preferred lab:      Send INR reminders to:  [facility] COUMADIN MONITORING POOL    Comments:  [facility] // pt to write down dosing instructions & repeat back          Anticoagulation Care Providers       Provider Role Specialty Phone number    Mundo Hudson, [facility] [phone]            Patient Findings       Negatives:  Signs/symptoms of thrombosis, Signs/symptoms of bleeding, Laboratory test error suspected, Change in health, Change in alcohol use, Change in activity, Upcoming invasive procedure, Emergency department visit, Upcoming dental procedure, Missed  doses, Extra doses, Change in medications, Change in diet/appetite, Hospital admission, Bruising, Other complaints    Comments:  0.5mg everyday                           No

## 2024-04-16 NOTE — BH CONSULTATION LIAISON PROGRESS NOTE - NSBHMSETHTPROC_PSY_A_CORE
Medical co-management
Circumstantial/Tangential/Perseverative
Circumstantial/Tangential/Perseverative

## 2024-04-19 ENCOUNTER — APPOINTMENT (OUTPATIENT)
Dept: CT IMAGING | Facility: CLINIC | Age: 34
End: 2024-04-19
Payer: MEDICAID

## 2024-04-19 PROCEDURE — 74177 CT ABD & PELVIS W/CONTRAST: CPT

## 2024-05-01 NOTE — ED ADULT NURSE NOTE - SUICIDE SCREENING QUESTION 2
Subjective:   Patient ID: Mikayla Mondragon is a 64 year old female.    HPI  Patient presents today requesting physical exam.       Diet: well balanced  Water intake is near gallon/day  Exercise: regular activity, weight training twice weekly  Tobacco use: denies  Drug use: denies  Alcohol use: denies  Sexually active: Not currently  LMP: Menopause  Marital status: Single     Health maintenance:  Pap/Hpv: 2/18/2022 normal  Mammogram:  10/12/23 with us done - stable/benign  Performs SBEs: Yes, family history of breast cancer in her sister at age 60.  Dexa scan: 2/2019 normal, not currently on calcium or vitamin D supplements  Colonoscopy:  3/2021, recall 10 years  Discussed age appropriate vaccines    Having lingering cough since January  Mild  Nonproductive the last couple weeks  No breathing difficulty  She gets seasonal allergies  Not taking otc medications  Rare acid reflux/heartburn symptoms    Some mornings she wakes up with cramps in her thigh, calf or feet  Gets out of bed and gets beds  Wakes her from sleep  Ongoing for one year  Happens intermittently  Sometimes there are changes in her activity, she was doing weight training and has stopped that      History/Other:   Review of Systems   Constitutional:  Negative for chills, fatigue and fever.   HENT:  Negative for congestion, ear pain, rhinorrhea and sore throat.    Eyes:  Negative for visual disturbance.   Respiratory:  Positive for cough. Negative for shortness of breath and wheezing.    Cardiovascular:  Negative for chest pain, palpitations and leg swelling.   Gastrointestinal:  Negative for abdominal pain, diarrhea, nausea and vomiting.   Genitourinary:  Negative for dysuria, frequency and hematuria.   Musculoskeletal:  Positive for myalgias (legs). Negative for arthralgias and gait problem.   Skin:  Negative for rash.   Neurological:  Negative for weakness, light-headedness and headaches.   Hematological:  Negative for adenopathy.   Psychiatric/Behavioral:   Negative for dysphoric mood. The patient is not nervous/anxious.      No current outpatient medications on file.     Allergies:No Known Allergies    Objective:   Physical Exam  Vitals and nursing note reviewed.   Constitutional:       General: She is not in acute distress.     Appearance: Normal appearance. She is well-developed.   HENT:      Head: Normocephalic and atraumatic.      Right Ear: Ear canal and external ear normal. A middle ear effusion is present.      Left Ear: Ear canal and external ear normal. A middle ear effusion is present.      Nose: Nose normal.      Mouth/Throat:      Mouth: Mucous membranes are moist.      Pharynx: Oropharynx is clear.      Comments: Post nasal drip  Eyes:      Extraocular Movements: Extraocular movements intact.      Conjunctiva/sclera: Conjunctivae normal.      Pupils: Pupils are equal, round, and reactive to light.   Neck:      Thyroid: No thyromegaly.   Cardiovascular:      Rate and Rhythm: Normal rate and regular rhythm.      Pulses: Normal pulses.      Heart sounds: Normal heart sounds.   Pulmonary:      Effort: Pulmonary effort is normal.      Breath sounds: Normal breath sounds. No wheezing or rales.   Abdominal:      General: Bowel sounds are normal. There is no distension.      Palpations: Abdomen is soft. There is no mass.      Tenderness: There is no abdominal tenderness.   Musculoskeletal:         General: No tenderness. Normal range of motion.      Cervical back: Normal range of motion and neck supple.   Lymphadenopathy:      Cervical: No cervical adenopathy.   Skin:     General: Skin is warm and dry.      Findings: No rash.   Neurological:      General: No focal deficit present.      Mental Status: She is alert and oriented to person, place, and time.   Psychiatric:         Mood and Affect: Mood normal.         Behavior: Behavior normal.         Assessment & Plan:   1. Routine general medical examination at a health care facility  Patient is generally healthy.   Physical exam is unremarkable.  Check fasting labs.  Health maintenance issues discussed. Encouraged routine vaccines.  Advised healthy diet and regular exercise.  Annual physicals.  - CBC With Differential With Platelet; Future  - Comp Metabolic Panel (14); Future  - Lipid Panel; Future  - Vitamin B12; Future  - Vitamin D; Future  - TSH and Free T4; Future  - Magnesium [E]; Future    2. Encounter for osteoporosis screening in asymptomatic postmenopausal patient  Recheck scan and treat accordingly.   - XR DEXA BONE DENSITOMETRY (CPT=77080); Future    3. Dense breasts  - [facility] ADRIEL 2D+3D DIAGNOSTIC AASHISH  BILAT (CPT=77066/94816); Future  - US BREAST BILATERAL COMPLETE (CPT=76641-50); Future    4. Family history of breast cancer in sister  - [facility] ADRIEL 2D+3D DIAGNOSTIC AASHISH  BILAT (CPT=77066/35670); Future  - US BREAST BILATERAL COMPLETE (CPT=76641-50); Future    5. Leg cramps  Check labs. Follow up pending results and treat accordingly. Discussed potential causes including dehydration, changes in physical activity, electrolyte derrangement, nocturnal leg cramps, RLS.  - Comp Metabolic Panel (14); Future  - TSH and Free T4; Future  - Magnesium [E]; Future    6. Persistent cough  Likely secondary to allergies/PND versus bronchitis. Recommend otc flonase and/or antihistamine regimen for 2-4 weeks and then reassess.          No

## 2024-05-03 NOTE — ED ADULT NURSE NOTE - PAIN RATING/NUMBER SCALE (0-10): REST
Subjective   Patient ID: Rosita Darby is a 60 y.o. female who presents for No chief complaint on file..  HPI    Review of Systems    Objective   Physical Exam    Assessment/Plan              .VS  
0

## 2024-05-03 NOTE — ED ADULT NURSE NOTE - DOES PATIENT HAVE ADVANCE DIRECTIVE
Onesimo Snowden - Surgery ([facility])  Brief Operative Note    Surgery Date: 5/3/2024     Surgeons and Role:     * Abelardo Morataya Jr., MD - Primary     * Keon Chan Yes

## 2024-05-13 ENCOUNTER — OUTPATIENT (OUTPATIENT)
Dept: OUTPATIENT SERVICES | Facility: HOSPITAL | Age: 34
LOS: 1 days | Discharge: HOME | End: 2024-05-13

## 2024-05-13 DIAGNOSIS — C62.90 MALIGNANT NEOPLASM OF UNSPECIFIED TESTIS, UNSPECIFIED WHETHER DESCENDED OR UNDESCENDED: ICD-10-CM

## 2024-05-13 DIAGNOSIS — Z90.79 ACQUIRED ABSENCE OF OTHER GENITAL ORGAN(S): Chronic | ICD-10-CM

## 2024-05-17 NOTE — ED PROVIDER NOTE - IV ALTEPLASE EXCL REL HIDDEN
Patient Call    Who are you speaking with? Patient    If it is not the patient, are they listed on an active communication consent form? N/A   What is the reason for this call? Patient is calling in stating that both legs are swollen and weeping.  She would like to speak to someone in the office.   Does this require a call back? Yes   If a call back is required, please list best call back number [phone]    If a call back is required, advise that a message will be forwarded to their care team and someone will return their call as soon as possible.   Did you relay this information to the patient? Yes      show

## 2024-05-21 NOTE — BH INPATIENT PSYCHIATRY PROGRESS NOTE - NSCGIIMPROVESX_PSY_ALL_CORE
Morphology Per Location (Optional): Dr Chavira about early 2006
Detail Level: Detailed
Size Of Lesion In Cm (Optional): 0
4 = No change - symptoms remain essentially unchanged
3 = Minimally improved - slightly better with little or no clinically meaningful reduction of symptoms.  Represents very little change in basic clinical status, level of care, or functional capacity.
3 = Minimally improved - slightly better with little or no clinically meaningful reduction of symptoms.  Represents very little change in basic clinical status, level of care, or functional capacity.
4 = No change - symptoms remain essentially unchanged

## 2024-05-24 ENCOUNTER — APPOINTMENT (OUTPATIENT)
Dept: HEMATOLOGY ONCOLOGY | Facility: CLINIC | Age: 34
End: 2024-05-24
Payer: MEDICAID

## 2024-05-24 ENCOUNTER — RESULT REVIEW (OUTPATIENT)
Age: 34
End: 2024-05-24

## 2024-05-24 VITALS
WEIGHT: 294.1 LBS | SYSTOLIC BLOOD PRESSURE: 97 MMHG | HEART RATE: 67 BPM | RESPIRATION RATE: 16 BRPM | OXYGEN SATURATION: 99 % | DIASTOLIC BLOOD PRESSURE: 66 MMHG | BODY MASS INDEX: 42.2 KG/M2 | TEMPERATURE: 96.8 F

## 2024-05-24 DIAGNOSIS — C62.12 MALIGNANT NEOPLASM OF DESCENDED LEFT TESTIS: ICD-10-CM

## 2024-05-24 DIAGNOSIS — C62.91 MALIGNANT NEOPLASM OF RIGHT TESTIS, UNSPECIFIED WHETHER DESCENDED OR UNDESCENDED: ICD-10-CM

## 2024-05-24 LAB
BASOPHILS # BLD AUTO: 0.03 K/UL — SIGNIFICANT CHANGE UP (ref 0–0.2)
BASOPHILS NFR BLD AUTO: 0.5 % — SIGNIFICANT CHANGE UP (ref 0–2)
EOSINOPHIL # BLD AUTO: 0.31 K/UL — SIGNIFICANT CHANGE UP (ref 0–0.5)
EOSINOPHIL NFR BLD AUTO: 4.7 % — SIGNIFICANT CHANGE UP (ref 0–6)
HCT VFR BLD CALC: 37.6 % — LOW (ref 39–50)
HGB BLD-MCNC: 11.8 G/DL — LOW (ref 13–17)
IMM GRANULOCYTES NFR BLD AUTO: 0.6 % — SIGNIFICANT CHANGE UP (ref 0–0.9)
LYMPHOCYTES # BLD AUTO: 2.41 K/UL — SIGNIFICANT CHANGE UP (ref 1–3.3)
LYMPHOCYTES # BLD AUTO: 36.9 % — SIGNIFICANT CHANGE UP (ref 13–44)
MCHC RBC-ENTMCNC: 25.4 PG — LOW (ref 27–34)
MCHC RBC-ENTMCNC: 31.4 G/DL — LOW (ref 32–36)
MCV RBC AUTO: 80.9 FL — SIGNIFICANT CHANGE UP (ref 80–100)
MONOCYTES # BLD AUTO: 0.46 K/UL — SIGNIFICANT CHANGE UP (ref 0–0.9)
MONOCYTES NFR BLD AUTO: 7 % — SIGNIFICANT CHANGE UP (ref 2–14)
NEUTROPHILS # BLD AUTO: 3.29 K/UL — SIGNIFICANT CHANGE UP (ref 1.8–7.4)
NEUTROPHILS NFR BLD AUTO: 50.3 % — SIGNIFICANT CHANGE UP (ref 43–77)
NRBC # BLD: 0 /100 WBCS — SIGNIFICANT CHANGE UP (ref 0–0)
NRBC BLD-RTO: 0 /100 WBCS — SIGNIFICANT CHANGE UP (ref 0–0)
PLATELET # BLD AUTO: 200 K/UL — SIGNIFICANT CHANGE UP (ref 150–400)
RBC # BLD: 4.65 M/UL — SIGNIFICANT CHANGE UP (ref 4.2–5.8)
RBC # FLD: 14.2 % — SIGNIFICANT CHANGE UP (ref 10.3–14.5)
WBC # BLD: 6.54 K/UL — SIGNIFICANT CHANGE UP (ref 3.8–10.5)
WBC # FLD AUTO: 6.54 K/UL — SIGNIFICANT CHANGE UP (ref 3.8–10.5)

## 2024-05-24 PROCEDURE — 99214 OFFICE O/P EST MOD 30 MIN: CPT

## 2024-05-24 PROCEDURE — G2211 COMPLEX E/M VISIT ADD ON: CPT | Mod: NC,1L

## 2024-05-24 RX ORDER — CLONIDINE HYDROCHLORIDE 0.1 MG/1
0.1 TABLET ORAL
Refills: 0 | Status: ACTIVE | COMMUNITY
Start: 2024-05-24

## 2024-05-24 RX ORDER — PRAVASTATIN SODIUM 40 MG/1
40 TABLET ORAL
Refills: 0 | Status: DISCONTINUED | COMMUNITY
End: 2024-05-24

## 2024-05-24 RX ORDER — NAPROXEN 500 MG/1
500 TABLET ORAL
Qty: 30 | Refills: 2 | Status: DISCONTINUED | COMMUNITY
End: 2024-05-24

## 2024-05-24 RX ORDER — ATORVASTATIN CALCIUM 40 MG/1
40 TABLET, FILM COATED ORAL
Refills: 0 | Status: ACTIVE | COMMUNITY
Start: 2024-05-24

## 2024-05-24 NOTE — ASSESSMENT
[FreeTextEntry1] : 34 year old male with history of autism who has stage Ia seminoma. Initial med onc consultation on 10/7/2022: He has stage Ia seminoma. Although his only LDH was 308, hCG and AFP were normal. LDH is nonspecific. NCCN also mentioned decisions regarding treatment should not be made on mildly elevated less than 3 upper limited of normal LDH alone. His LDH will be followed. He has stage Ia seminoma. The recurrence in 5 years is about 10% to 15%. If he is treated with radiation vs one or two doses of carboplatin auc 7, overtreatment is 85% to 90%. He will be under surveillance given salvage treatment about 100%. He will have follow up in one month for LDH level, then every 3 months in the first year, every 6 months in the year 2 and 3, annually in the year 4 and 5. Surveillance images every 6 months in the first two years, annually in the year 3, 4 and 5. His LDH on Oct 27 was 325, however, hCG and AFP wnl. Andrews 3, . Jan 30, 2023 CT abd and pelvis showed showed KAIN. s/p right orchiectomy, T1a on 11/15/23   Plan - s/p left orchiectomy, 2022  - s/p right orchiectomy, T1a on 11/15/23  -11/20/23 CT abdomen/pelvis w/con showed no small bowel obstruction or active bowel inflammation. Interval resolution of previously visualized mesenteric panniculitis.  Mildly prominent central mesenteric lymph nodes. He has right testicle stage I seminoma, will be on surveillance scans without any further treatment - 4/19/24 CT AP: No evidence of retrocrural or retroperitoneal adenopathy. 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 Fly Duffy   Pt's Mother: [phone] Dr. Paul, Urologist: [phone]  Instructed to contact our office with any new/worsening symptoms. Patient educated regarding plan of care, all questions/concerns addressed to the best of my abilities and patient's apparent satisfaction. RTC 3 months  [Future Reassessment of Pain Scale] : Future reassessment of pain scale

## 2024-05-24 NOTE — ED ADULT NURSE NOTE - NSFALLRSKOUTCOME_ED_ALL_ED
Please schedule the following orders:  4w cmp and direct bilirubin           Dear MikalHector MOYA Gonzalo       I have reviewed your recent blood work:       - Your complete blood count is normal     - Your metabolic panel which shows your electrolytes, glucose, kidney function, and liver function : mildly elevated potassium and bilirubin. Repeat 4w    A1c (3 month average of blood glucose) is elevated to 5.7 = PREDIABETES.  prediabetes (A1c 5.7-6.4); diabetes = A1c >6.5.  Please limit sugary drinks (sodas, juices, sweet teas); please participate in regular daily exercise 30min 3-5 days weekly.   We will repeat in 6 months       - Your PSA (prostate cancer screen) is normal            Please do not hesitate to call or message with any additional questions or concerns.  Mayte Mccracken MD [employer]

## 2024-05-24 NOTE — REVIEW OF SYSTEMS
[Fever] : no fever [Fatigue] : no fatigue [Chest Pain] : no chest pain [Palpitations] : no palpitations [Lower Ext Edema] : no lower extremity edema [Shortness Of Breath] : no shortness of breath [Cough] : no cough [Abdominal Pain] : no abdominal pain [Vomiting] : no vomiting [Constipation] : no constipation [Diarrhea: Grade 1 - Increase of <4 stools per day over baseline; mild increase in ostomy output compared to baseline] : Diarrhea: Grade 1 - Increase of <4 stools per day over baseline; mild increase in ostomy output compared to baseline [Dysuria] : dysuria [Incontinence] : incontinence [Joint Pain] : joint pain [Muscle Pain] : muscle pain [Skin Rash] : no skin rash

## 2024-05-24 NOTE — PHYSICAL EXAM
[Fully active, able to carry on all pre-disease performance without restriction] : Status 0 - Fully active, able to carry on all pre-disease performance without restriction [Obese] : obese [Normal] : affect appropriate [de-identified] : anicteric  [de-identified] : no edema  [de-identified] : b/l orchiectomy. no masses appreciated in scrotal sac  [de-identified] : R arm/hand in soft cast
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
## 2024-05-26 ENCOUNTER — EMERGENCY (EMERGENCY)
Facility: HOSPITAL | Age: 34
LOS: 1 days | Discharge: HOME | End: 2024-05-26
Attending: EMERGENCY MEDICINE
Payer: MEDICAID

## 2024-05-26 ENCOUNTER — NON-APPOINTMENT (OUTPATIENT)
Age: 34
End: 2024-05-26

## 2024-05-26 VITALS
WEIGHT: 300.05 LBS | OXYGEN SATURATION: 95 % | SYSTOLIC BLOOD PRESSURE: 112 MMHG | HEIGHT: 71 IN | RESPIRATION RATE: 18 BRPM | DIASTOLIC BLOOD PRESSURE: 77 MMHG | HEART RATE: 68 BPM | TEMPERATURE: 98 F

## 2024-05-26 DIAGNOSIS — Z90.79 ACQUIRED ABSENCE OF OTHER GENITAL ORGAN(S): Chronic | ICD-10-CM

## 2024-05-26 PROCEDURE — 99284 EMERGENCY DEPT VISIT MOD MDM: CPT

## 2024-05-26 NOTE — ED ADULT TRIAGE NOTE - CHIEF COMPLAINT QUOTE
[facility]IBA EMS report that pt has chest painx1 day  , lower abdominal pain and difficulty urinating for 2 hours

## 2024-05-27 LAB
AFP-TM SERPL-MCNC: 3.2 NG/ML
ALBUMIN SERPL ELPH-MCNC: 4.2 G/DL
ALP BLD-CCNC: 99 U/L
ALT SERPL-CCNC: 53 U/L
ANION GAP SERPL CALC-SCNC: 13 MMOL/L
APPEARANCE UR: CLEAR — SIGNIFICANT CHANGE UP
AST SERPL-CCNC: 27 U/L
BILIRUB SERPL-MCNC: 0.2 MG/DL
BILIRUB UR-MCNC: NEGATIVE — SIGNIFICANT CHANGE UP
BUN SERPL-MCNC: 16 MG/DL
CALCIUM SERPL-MCNC: 9.2 MG/DL
CHLORIDE SERPL-SCNC: 101 MMOL/L
CO2 SERPL-SCNC: 24 MMOL/L
COLOR SPEC: YELLOW — SIGNIFICANT CHANGE UP
CREAT SERPL-MCNC: 0.74 MG/DL
DIFF PNL FLD: NEGATIVE — SIGNIFICANT CHANGE UP
EGFR: 121 ML/MIN/1.73M2
GLUCOSE SERPL-MCNC: 141 MG/DL
GLUCOSE UR QL: NEGATIVE MG/DL — SIGNIFICANT CHANGE UP
HCG-TM SERPL-MCNC: <1 MIU/ML
KETONES UR-MCNC: NEGATIVE MG/DL — SIGNIFICANT CHANGE UP
LDH SERPL-CCNC: 265 U/L
LEUKOCYTE ESTERASE UR-ACNC: NEGATIVE — SIGNIFICANT CHANGE UP
NITRITE UR-MCNC: NEGATIVE — SIGNIFICANT CHANGE UP
PH UR: 6 — SIGNIFICANT CHANGE UP (ref 5–8)
POTASSIUM SERPL-SCNC: 4.1 MMOL/L
PROT SERPL-MCNC: 6.2 G/DL
PROT UR-MCNC: NEGATIVE MG/DL — SIGNIFICANT CHANGE UP
SODIUM SERPL-SCNC: 138 MMOL/L
SP GR SPEC: 1.01 — SIGNIFICANT CHANGE UP (ref 1–1.03)
UROBILINOGEN FLD QL: 0.2 MG/DL — SIGNIFICANT CHANGE UP (ref 0.2–1)

## 2024-05-27 PROCEDURE — 93005 ELECTROCARDIOGRAM TRACING: CPT

## 2024-05-27 PROCEDURE — 99283 EMERGENCY DEPT VISIT LOW MDM: CPT | Mod: 25

## 2024-05-27 PROCEDURE — 81003 URINALYSIS AUTO W/O SCOPE: CPT

## 2024-05-27 NOTE — ED PROVIDER NOTE - NSFOLLOWUPINSTRUCTIONS_ED_ALL_ED_FT
Return to ER immediately if your chest pain returns, if you have pain with radiation to arms, back or neck, if you feel short of breath, feel weak, feel fast or pounding heart beating, if you have any fever or vomiting.  If you need assistance with follow up appointments our Care Coordinator can be reached at [phone]. In addition the [facility] is located at [address] [place], [place] [id number], tel: [phone].

## 2024-05-27 NOTE — ED PROVIDER NOTE - NSFOLLOWUPCLINICS_GEN_ALL_ED_FT
[facility]  Internal Medicine  [address] [place] [address]  Phone: [phone]  Fax: [phone]

## 2024-05-27 NOTE — ED PROVIDER NOTE - CLINICAL SUMMARY MEDICAL DECISION MAKING FREE TEXT BOX
EKG shows no evidence of STEMI or ischemic changes.  UA shows no evidence of UTI, patient has no suprapubic tenderness no guarding to abdominal palpation.  Patient stable for discharge back to his group home with staff member Daniel.  Pt is well appearing, has no new complaints and able to walk with normal gait. Pt is stable for discharge and follow up with medical doctor. Pt educated on care and need for follow up. Discussed anticipatory guidance and return precautions. Questions answered. I had a detailed discussion with the patient regarding the historical points, exam findings, and any diagnostic results supporting the discharge diagnosis.
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
## 2024-05-27 NOTE — ED PROVIDER NOTE - OBJECTIVE STATEMENT
34-year-old male history of autism/intellectual disability.  Patient accompanied with staff member Daniel from [facility].   patient endorsed to triage he had chest pain and difficulty urinating.  On my evaluation patient has no complaints.  Patient's EKG is normal sinus rhythm at 66 bpm unchanged from previous EKGs and UA is not consistent with UTI.  Patient has no suprapubic distention and no guarding to full palpation of abdomen.

## 2024-05-27 NOTE — ED ADULT NURSE NOTE - OBJECTIVE STATEMENT
Patient presents to ED Patient presents to ED c/o urinary retention x1 day. Pt denies any sob, dizziness, fever/chills, n/v/d.

## 2024-05-27 NOTE — ED PROVIDER NOTE - PATIENT PORTAL LINK FT
You can access the FollowMyHealth Patient Portal offered by [facility] by registering at the following website: [email or web address]. By joining [employer]’s FollowMyHealth portal, you will also be able to view your health information using other applications (apps) compatible with our system.

## 2024-05-28 DIAGNOSIS — N39.0 URINARY TRACT INFECTION, SITE NOT SPECIFIED: ICD-10-CM

## 2024-05-28 LAB
APPEARANCE: CLEAR
BACTERIA UR CULT: ABNORMAL
BACTERIA: ABNORMAL /HPF
BILIRUBIN URINE: NEGATIVE
BLOOD URINE: NEGATIVE
CAST: 0 /LPF
COLOR: YELLOW
EPITHELIAL CELLS: 2 /HPF
GLUCOSE QUALITATIVE U: NEGATIVE MG/DL
KETONES URINE: NEGATIVE MG/DL
LEUKOCYTE ESTERASE URINE: NEGATIVE
MICROSCOPIC-UA: NORMAL
NITRITE URINE: NEGATIVE
PH URINE: 7.5
PROTEIN URINE: NEGATIVE MG/DL
RED BLOOD CELLS URINE: 0 /HPF
SPECIFIC GRAVITY URINE: 1.02
UROBILINOGEN URINE: 1 MG/DL
WHITE BLOOD CELLS URINE: 1 /HPF

## 2024-05-28 RX ORDER — LEVOFLOXACIN 750 MG/1
750 TABLET, FILM COATED ORAL DAILY
Qty: 7 | Refills: 0 | Status: ACTIVE | COMMUNITY
Start: 2024-05-28 | End: 1900-01-01

## 2024-06-01 NOTE — ED ADULT NURSE NOTE - PAIN RATING/NUMBER SCALE (0-10): ACTIVITY
[facility]                        [address] [address]    PATIENT NAME:      JOSLYN MEAD   YOB: 1962  CSN:               [id number]  MRN:               [id number]  ADMIT DATE:        05/29/2024 11:36:00  PHYSICIAN:         Junito Hamm MD                          OPERATIVE REPORT      DATE OF SURGERY:    06/01/2024 00:00:00    SURGEON:  Junito Hamm MD    PROCEDURE:  Laparoscopic cholecystectomy and intraoperative cholangiogram.    PREOPERATIVE DIAGNOSES:  Elevated LFTs and cholelithiasis.    POSTOPERATIVE DIAGNOSES:  Elevated LFTs and cholelithiasis.    ANESTHESIA:  General endotracheal anesthesia with local to sites.    COMPLICATIONS:  None.    ESTIMATED BLOOD LOSS:  100.    CONDITION:  Good.    SPECIMEN:  Gallbladder.    ASSISTANT:  Jonathan Dueñas    INDICATION FOR PROCEDURE:  This is a 61-year-old female who presents with right   upper quadrant pain and elevated LFTs.  MRI was unavailable.  GI was on the   case.  Her LFTs were coming down.  It was felt that cholecystectomy with   cholangiogram was the next step.    FINDINGS:  Some small friable stones in the cystic duct and likely in the   biliary tree.  Everything flushed through at the end of the case without   difficulty.    PROCEDURE IN DETAIL:  The patient was brought to the operating room.  She was   brought under general endotracheal anesthesia.  She was prepped and draped in   sterile fashion.  Antibiotics were given.  Heparin was given and a time-out was   called.  The left upper quadrant Veress access to the abdomen was performed with   water drop test.  There was no evidence of bowel or trocar injury.  The abdomen   was insufflated 15 mmHg.  Right upper quadrant Optiview trocar was placed.  The   patient had a previous lower midline incision and we avoided that area.  Upon   entrance of the abdomen, there was no evidence of bowel or trocar  injury.  There   were some lower midline adhesions and these were avoided.  Three 5 mm ports and   a subxiphoid 11 mm port was placed.  There were signs of Xzfv-Cqzd-Qriujx   adhesions and the liver was stuck to the abdominal wall.  We decided to take   these down in order to retract the gallbladder more successfully.  The   gallbladder was also decompressed in order to more easily manipulate the   gallbladder.  The triangle of Calot was dissected out.  There was an obvious   cystic duct and cystic artery.  The cystic duct was long and tortuous and there   were small stones noted in the cystic duct even before cholangiogram.  The   cystic artery was clipped twice proximally, once distally, and transected.  The   cystic duct was clipped once distally, and a ductotomy was performed.  The small   stones were milked out of the cystic duct and a cholangiogram was performed   through the cystic duct.  There was good flow through the biliary tree with   possibly a sulcus sign initially at the ampulla, but upon further administration   of contrast and saline flush, the contrast passed easily into duodenum without   any other evidence of filling defects.  The cholangiogram was run again and the   same findings were noted with good flow through the biliary tree.  The   cholangiogram was completed.  The proximal cystic duct was clipped 3 times, the   duct was transected.  The gallbladder was removed from the gallbladder bed with   Bovie cautery.  The gallbladder was slightly intrahepatic.  There was some mild   bleeding.  Surgiflo and Surgicel powder were placed in that area with good   hemostasis.  We also placed Surgicel powder over the top of the liver where the   Npoz-Fses-Bitbmg adhesions were taken down.  The abdomen was inspected.  There   was no evidence of other pathology.  There was no evidence of bowel or trocar   injury.  The abdomen was deflated.  The port sites removed.  The port sites were   closed with 4-0  Vicryl.  The subxiphoid port was closed with 0 Vicryl in the   fascia.  The patient tolerated procedure well, was awoken without difficulty and   brought to the recovery room without further event.        ______________________________  MD JAMAL Moreira/DIGNA  DD:  06/01/2024  Time:  10:22AM  DT:  06/01/2024  Time:  11:20AM  Job #:  [id number]      OPERATIVE REPORT       0 (no pain/absence of nonverbal indicators of pain)

## 2024-06-03 NOTE — ED ADULT NURSE NOTE - CHPI ED NUR SYMPTOMS POS
DYSURIA

## 2024-06-03 NOTE — ED PROVIDER NOTE - NSFOLLOWUPINSTRUCTIONS_ED_ALL_ED_FT
Please see previous notes from this SW for continuity.    __________________________________________________  [facility]I [facility] for an update regarding pt's referral. KARI spoke with clinic manager Jaimee who informed she is waiting for medical director approval. Jaimee requested recent progress note is faxed to [facility]. Jaimee informed she will reach out to the medical director for an update.    KARI faxed recent progress note to [facility].    Kati Loredo updated via secure chat.    KARI to follow with updates.    Dafne Mccord, GAYLA  [facility]  X [phone]     33 male with impulse control disorder, factitious disorder, moderate/disability, & prior psychiatric admissions. Pt presenting to the ED with reports of self-harm attempt & thoughts.  Wound noted to abdomen appears to be picked at skin lesion.  No actual laceration or stab wound.  Group home aide at bedside reports that patient does not have access to any items to hurt himself.    Vitals stable.    Nontoxic appearing, n/v intact.  Airway intact, no respiratory distress, no hypoxia.  No abdominal or CVA tenderness.  Non-focal neuro    Plan to obtain:    -Labs, 1: 1 observation for safety, sedation as needed for safety, psychiatry consult, observe/reassess    Prior ED note reviewed.  Patient seen in ED 1 hour prior reporting chest pain. Please follow up with your PMD or Medicine Clinic in 2-3 days   Follow up with Psychiatry this week  Return to the ER for worsening or concerning symptoms.   Keep wound clean and dry   Apply antibiotic ointment twice a day

## 2024-06-04 ENCOUNTER — APPOINTMENT (OUTPATIENT)
Dept: RADIOLOGY | Facility: IMAGING CENTER | Age: 34
End: 2024-06-04
Payer: MEDICAID

## 2024-06-04 ENCOUNTER — OUTPATIENT (OUTPATIENT)
Dept: OUTPATIENT SERVICES | Facility: HOSPITAL | Age: 34
LOS: 1 days | End: 2024-06-04
Payer: MEDICAID

## 2024-06-04 DIAGNOSIS — C62.12 MALIGNANT NEOPLASM OF DESCENDED LEFT TESTIS: ICD-10-CM

## 2024-06-04 DIAGNOSIS — Z90.79 ACQUIRED ABSENCE OF OTHER GENITAL ORGAN(S): Chronic | ICD-10-CM

## 2024-06-04 PROCEDURE — 71046 X-RAY EXAM CHEST 2 VIEWS: CPT | Mod: 26

## 2024-06-04 PROCEDURE — 71046 X-RAY EXAM CHEST 2 VIEWS: CPT

## 2024-06-09 ENCOUNTER — EMERGENCY (EMERGENCY)
Facility: HOSPITAL | Age: 34
LOS: 1 days | Discharge: HOME | End: 2024-06-09
Admitting: STUDENT IN AN ORGANIZED HEALTH CARE EDUCATION/TRAINING PROGRAM
Payer: MEDICAID

## 2024-06-09 VITALS
SYSTOLIC BLOOD PRESSURE: 105 MMHG | HEIGHT: 71 IN | OXYGEN SATURATION: 95 % | RESPIRATION RATE: 16 BRPM | TEMPERATURE: 98 F | HEART RATE: 90 BPM | DIASTOLIC BLOOD PRESSURE: 50 MMHG | WEIGHT: 289.91 LBS

## 2024-06-09 DIAGNOSIS — F39 UNSPECIFIED MOOD [AFFECTIVE] DISORDER: ICD-10-CM

## 2024-06-09 DIAGNOSIS — Z90.79 ACQUIRED ABSENCE OF OTHER GENITAL ORGAN(S): Chronic | ICD-10-CM

## 2024-06-09 DIAGNOSIS — F84.0 AUTISTIC DISORDER: ICD-10-CM

## 2024-06-09 DIAGNOSIS — F79 UNSPECIFIED INTELLECTUAL DISABILITIES: ICD-10-CM

## 2024-06-09 LAB
ALBUMIN SERPL ELPH-MCNC: 4.2 G/DL — SIGNIFICANT CHANGE UP (ref 3.3–5)
ALP SERPL-CCNC: 104 U/L — SIGNIFICANT CHANGE UP (ref 40–120)
ALT FLD-CCNC: 37 U/L — SIGNIFICANT CHANGE UP (ref 4–41)
AMPHET UR-MCNC: NEGATIVE — SIGNIFICANT CHANGE UP
ANION GAP SERPL CALC-SCNC: 14 MMOL/L — SIGNIFICANT CHANGE UP (ref 7–14)
APAP SERPL-MCNC: <10 UG/ML — LOW (ref 15–25)
APPEARANCE UR: CLEAR — SIGNIFICANT CHANGE UP
AST SERPL-CCNC: 21 U/L — SIGNIFICANT CHANGE UP (ref 4–40)
BARBITURATES UR SCN-MCNC: NEGATIVE — SIGNIFICANT CHANGE UP
BASOPHILS # BLD AUTO: 0.03 K/UL — SIGNIFICANT CHANGE UP (ref 0–0.2)
BASOPHILS NFR BLD AUTO: 0.5 % — SIGNIFICANT CHANGE UP (ref 0–2)
BENZODIAZ UR-MCNC: NEGATIVE — SIGNIFICANT CHANGE UP
BILIRUB SERPL-MCNC: <0.2 MG/DL — SIGNIFICANT CHANGE UP (ref 0.2–1.2)
BILIRUB UR-MCNC: NEGATIVE — SIGNIFICANT CHANGE UP
BUN SERPL-MCNC: 16 MG/DL — SIGNIFICANT CHANGE UP (ref 7–23)
CALCIUM SERPL-MCNC: 9.5 MG/DL — SIGNIFICANT CHANGE UP (ref 8.4–10.5)
CHLORIDE SERPL-SCNC: 100 MMOL/L — SIGNIFICANT CHANGE UP (ref 98–107)
CO2 SERPL-SCNC: 22 MMOL/L — SIGNIFICANT CHANGE UP (ref 22–31)
COCAINE METAB.OTHER UR-MCNC: NEGATIVE — SIGNIFICANT CHANGE UP
COLOR SPEC: YELLOW — SIGNIFICANT CHANGE UP
CREAT SERPL-MCNC: 0.65 MG/DL — SIGNIFICANT CHANGE UP (ref 0.5–1.3)
CREATININE URINE RESULT, DAU: 153 MG/DL — SIGNIFICANT CHANGE UP
DIFF PNL FLD: NEGATIVE — SIGNIFICANT CHANGE UP
EGFR: 126 ML/MIN/1.73M2 — SIGNIFICANT CHANGE UP
EOSINOPHIL # BLD AUTO: 0.2 K/UL — SIGNIFICANT CHANGE UP (ref 0–0.5)
EOSINOPHIL NFR BLD AUTO: 3 % — SIGNIFICANT CHANGE UP (ref 0–6)
ETHANOL SERPL-MCNC: <10 MG/DL — SIGNIFICANT CHANGE UP
FENTANYL UR QL SCN: NEGATIVE — SIGNIFICANT CHANGE UP
GLUCOSE SERPL-MCNC: 172 MG/DL — HIGH (ref 70–99)
GLUCOSE UR QL: NEGATIVE MG/DL — SIGNIFICANT CHANGE UP
HCT VFR BLD CALC: 40.8 % — SIGNIFICANT CHANGE UP (ref 39–50)
HGB BLD-MCNC: 12.8 G/DL — LOW (ref 13–17)
IANC: 3.37 K/UL — SIGNIFICANT CHANGE UP (ref 1.8–7.4)
IMM GRANULOCYTES NFR BLD AUTO: 1.1 % — HIGH (ref 0–0.9)
KETONES UR-MCNC: ABNORMAL MG/DL
LEUKOCYTE ESTERASE UR-ACNC: NEGATIVE — SIGNIFICANT CHANGE UP
LYMPHOCYTES # BLD AUTO: 2.52 K/UL — SIGNIFICANT CHANGE UP (ref 1–3.3)
LYMPHOCYTES # BLD AUTO: 38 % — SIGNIFICANT CHANGE UP (ref 13–44)
MCHC RBC-ENTMCNC: 24.4 PG — LOW (ref 27–34)
MCHC RBC-ENTMCNC: 31.4 GM/DL — LOW (ref 32–36)
MCV RBC AUTO: 77.7 FL — LOW (ref 80–100)
METHADONE UR-MCNC: NEGATIVE — SIGNIFICANT CHANGE UP
MONOCYTES # BLD AUTO: 0.44 K/UL — SIGNIFICANT CHANGE UP (ref 0–0.9)
MONOCYTES NFR BLD AUTO: 6.6 % — SIGNIFICANT CHANGE UP (ref 2–14)
NEUTROPHILS # BLD AUTO: 3.37 K/UL — SIGNIFICANT CHANGE UP (ref 1.8–7.4)
NEUTROPHILS NFR BLD AUTO: 50.8 % — SIGNIFICANT CHANGE UP (ref 43–77)
NITRITE UR-MCNC: NEGATIVE — SIGNIFICANT CHANGE UP
NRBC # BLD: 0 /100 WBCS — SIGNIFICANT CHANGE UP (ref 0–0)
NRBC # FLD: 0 K/UL — SIGNIFICANT CHANGE UP (ref 0–0)
OPIATES UR-MCNC: NEGATIVE — SIGNIFICANT CHANGE UP
OXYCODONE UR-MCNC: NEGATIVE — SIGNIFICANT CHANGE UP
PCP SPEC-MCNC: SIGNIFICANT CHANGE UP
PCP UR-MCNC: NEGATIVE — SIGNIFICANT CHANGE UP
PH UR: 6 — SIGNIFICANT CHANGE UP (ref 5–8)
PLATELET # BLD AUTO: 244 K/UL — SIGNIFICANT CHANGE UP (ref 150–400)
POTASSIUM SERPL-MCNC: 4.7 MMOL/L — SIGNIFICANT CHANGE UP (ref 3.5–5.3)
POTASSIUM SERPL-SCNC: 4.7 MMOL/L — SIGNIFICANT CHANGE UP (ref 3.5–5.3)
PROT SERPL-MCNC: 6.9 G/DL — SIGNIFICANT CHANGE UP (ref 6–8.3)
PROT UR-MCNC: NEGATIVE MG/DL — SIGNIFICANT CHANGE UP
RBC # BLD: 5.25 M/UL — SIGNIFICANT CHANGE UP (ref 4.2–5.8)
RBC # FLD: 14.2 % — SIGNIFICANT CHANGE UP (ref 10.3–14.5)
SALICYLATES SERPL-MCNC: <0.3 MG/DL — LOW (ref 15–30)
SODIUM SERPL-SCNC: 136 MMOL/L — SIGNIFICANT CHANGE UP (ref 135–145)
SP GR SPEC: 1.02 — SIGNIFICANT CHANGE UP (ref 1–1.03)
THC UR QL: NEGATIVE — SIGNIFICANT CHANGE UP
TOXICOLOGY SCREEN, DRUGS OF ABUSE, SERUM RESULT: SIGNIFICANT CHANGE UP
TSH SERPL-MCNC: 5.51 UIU/ML — HIGH (ref 0.27–4.2)
UROBILINOGEN FLD QL: 1 MG/DL — SIGNIFICANT CHANGE UP (ref 0.2–1)
WBC # BLD: 6.63 K/UL — SIGNIFICANT CHANGE UP (ref 3.8–10.5)
WBC # FLD AUTO: 6.63 K/UL — SIGNIFICANT CHANGE UP (ref 3.8–10.5)

## 2024-06-09 PROCEDURE — 73100 X-RAY EXAM OF WRIST: CPT | Mod: 26,RT

## 2024-06-09 PROCEDURE — 93010 ELECTROCARDIOGRAM REPORT: CPT

## 2024-06-09 PROCEDURE — 99285 EMERGENCY DEPT VISIT HI MDM: CPT | Mod: GC

## 2024-06-09 PROCEDURE — 99285 EMERGENCY DEPT VISIT HI MDM: CPT

## 2024-06-09 RX ORDER — LORATADINE 10 MG/1
10 TABLET ORAL DAILY
Refills: 0 | Status: DISCONTINUED | OUTPATIENT
Start: 2024-06-09 | End: 2024-06-13

## 2024-06-09 RX ORDER — RISPERIDONE 4 MG/1
4 TABLET ORAL
Refills: 0 | Status: DISCONTINUED | OUTPATIENT
Start: 2024-06-09 | End: 2024-06-13

## 2024-06-09 RX ORDER — DIVALPROEX SODIUM 500 MG/1
1000 TABLET, DELAYED RELEASE ORAL AT BEDTIME
Refills: 0 | Status: DISCONTINUED | OUTPATIENT
Start: 2024-06-09 | End: 2024-06-13

## 2024-06-09 RX ORDER — TETANUS TOXOID, REDUCED DIPHTHERIA TOXOID AND ACELLULAR PERTUSSIS VACCINE, ADSORBED 5; 2.5; 8; 8; 2.5 [IU]/.5ML; [IU]/.5ML; UG/.5ML; UG/.5ML; UG/.5ML
0.5 SUSPENSION INTRAMUSCULAR ONCE
Refills: 0 | Status: COMPLETED | OUTPATIENT
Start: 2024-06-09 | End: 2024-06-09

## 2024-06-09 RX ORDER — MIRTAZAPINE 45 MG/1
22.5 TABLET, ORALLY DISINTEGRATING ORAL AT BEDTIME
Refills: 0 | Status: DISCONTINUED | OUTPATIENT
Start: 2024-06-09 | End: 2024-06-13

## 2024-06-09 RX ORDER — LEVOTHYROXINE SODIUM 125 MCG
50 TABLET ORAL DAILY
Refills: 0 | Status: DISCONTINUED | OUTPATIENT
Start: 2024-06-09 | End: 2024-06-13

## 2024-06-09 RX ORDER — PANTOPRAZOLE SODIUM 20 MG/1
40 TABLET, DELAYED RELEASE ORAL
Refills: 0 | Status: DISCONTINUED | OUTPATIENT
Start: 2024-06-09 | End: 2024-06-13

## 2024-06-09 RX ORDER — CHLORPROMAZINE HCL 10 MG
50 TABLET ORAL ONCE
Refills: 0 | Status: COMPLETED | OUTPATIENT
Start: 2024-06-09 | End: 2024-06-09

## 2024-06-09 RX ORDER — BACITRACIN ZINC 500 UNIT/G
1 OINTMENT IN PACKET (EA) TOPICAL ONCE
Refills: 0 | Status: COMPLETED | OUTPATIENT
Start: 2024-06-09 | End: 2024-06-09

## 2024-06-09 RX ORDER — METFORMIN HYDROCHLORIDE 850 MG/1
1000 TABLET ORAL
Refills: 0 | Status: DISCONTINUED | OUTPATIENT
Start: 2024-06-09 | End: 2024-06-13

## 2024-06-09 RX ORDER — DIVALPROEX SODIUM 500 MG/1
500 TABLET, DELAYED RELEASE ORAL DAILY
Refills: 0 | Status: DISCONTINUED | OUTPATIENT
Start: 2024-06-09 | End: 2024-06-13

## 2024-06-09 RX ORDER — ATORVASTATIN CALCIUM 80 MG/1
10 TABLET, FILM COATED ORAL AT BEDTIME
Refills: 0 | Status: DISCONTINUED | OUTPATIENT
Start: 2024-06-09 | End: 2024-06-13

## 2024-06-09 RX ORDER — TAMSULOSIN HYDROCHLORIDE 0.4 MG/1
0.4 CAPSULE ORAL AT BEDTIME
Refills: 0 | Status: DISCONTINUED | OUTPATIENT
Start: 2024-06-09 | End: 2024-06-13

## 2024-06-09 RX ADMIN — Medication 50 MILLIGRAM(S): at 19:18

## 2024-06-09 RX ADMIN — TAMSULOSIN HYDROCHLORIDE 0.4 MILLIGRAM(S): 0.4 CAPSULE ORAL at 23:21

## 2024-06-09 RX ADMIN — ATORVASTATIN CALCIUM 10 MILLIGRAM(S): 80 TABLET, FILM COATED ORAL at 23:22

## 2024-06-09 RX ADMIN — DIVALPROEX SODIUM 1000 MILLIGRAM(S): 500 TABLET, DELAYED RELEASE ORAL at 23:21

## 2024-06-09 RX ADMIN — RISPERIDONE 4 MILLIGRAM(S): 4 TABLET ORAL at 23:21

## 2024-06-09 RX ADMIN — Medication 1 APPLICATION(S): at 22:21

## 2024-06-09 RX ADMIN — MIRTAZAPINE 22.5 MILLIGRAM(S): 45 TABLET, ORALLY DISINTEGRATING ORAL at 23:21

## 2024-06-09 RX ADMIN — TETANUS TOXOID, REDUCED DIPHTHERIA TOXOID AND ACELLULAR PERTUSSIS VACCINE, ADSORBED 0.5 MILLILITER(S): 5; 2.5; 8; 8; 2.5 SUSPENSION INTRAMUSCULAR at 22:20

## 2024-06-09 NOTE — ED BEHAVIORAL HEALTH ASSESSMENT NOTE - CURRENT MEDICATION
VPA 500mg AM, VPA 1000mg PM, Guanfacine 2mg daily, Risperdal 3mg BID, and Remeron 30mg HS VPA 500mg qNoon and 1000mg qHS, Risperdal 4mg BID, Buspar 15mg qHS, Remeron 22.5mg qHS

## 2024-06-09 NOTE — ED ADULT TRIAGE NOTE - CHIEF COMPLAINT QUOTE
from [facility].  states  " I want to hurt myself and everyone  else" states  " I feel that way for the past month"  cast r arm- fx wrist.  states " I was cutting self l wrist area 2 wks ago "  wound to l wrist area with redness noted.   finger stick 154
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
## 2024-06-09 NOTE — ED BEHAVIORAL HEALTH ASSESSMENT NOTE - VIOLENCE RISK FACTORS:
Impulsivity/Noncompliance with treatment Impulsivity/Community stressors that increase the risk of destabilization
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
## 2024-06-09 NOTE — ED BEHAVIORAL HEALTH ASSESSMENT NOTE - DIFFERENTIAL
Intellectual disability, Autism Spectrum Disorder, impulse control disorder, factitious disorder Intellectual disability, Autism Spectrum Disorder, mood disorder NOS

## 2024-06-09 NOTE — ED BEHAVIORAL HEALTH ASSESSMENT NOTE - NSBHROSSYSTEMS_PSY_ALL_CORE
+ superficial laceration over left forearm (distal 3rd). no active bleeding currently/Psychiatric L wrist bandaged. R wrist w/ bruising./Psychiatric L wrist bandaged. R wrist w/ bruising; OTHERWISE, Pt currently denies experiencing any headaches; has no dizziness, no blurring of vision; no cough/ colds, no sore throat; no fever. not complaining of any chest pains, no SOB, no palpitations; no abdominal/ flank pains, no nausea/ vomiting or diarrhea/ constipation; no dysuria, no hesitancy, no polyuria, no hematuria; no pruritus/ no rashes nor any edema. denied any muscle/ joint pains/Psychiatric
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
## 2024-06-09 NOTE — ED PROVIDER NOTE - PATIENT PORTAL LINK FT
You can access the FollowMyHealth Patient Portal offered by [facility] by registering at the following website: [email or web address]. By joining [employer]’s FollowMyHealth portal, you will also be able to view your health information using other applications (apps) compatible with our system.

## 2024-06-09 NOTE — ED BEHAVIORAL HEALTH ASSESSMENT NOTE - NSBHMSEBEHAV_PSY_A_CORE
childish, not guarded, not suspicious; overtly enthusiastic to be here (at the ED)/Cooperative childish, not guarded, not suspicious/Cooperative
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
## 2024-06-09 NOTE — ED BEHAVIORAL HEALTH ASSESSMENT NOTE - NSSUICPROTFACT_PSY_ALL_CORE
Responsibility to children, family, or others/Identifies reasons for living/Supportive social network of family or friends/Positive therapeutic relationships Identifies reasons for living/Positive therapeutic relationships

## 2024-06-09 NOTE — ED ADULT NURSE NOTE - CHIEF COMPLAINT QUOTE
from [facility].  states  " I want to hurt myself and everyone  else" states  " I feel that way for the past month"  cast r arm- fx wrist.  states " I was cutting self l wrist area 2 wks ago "  wound to l wrist area with redness noted.   finger stick 154

## 2024-06-09 NOTE — ED PROVIDER NOTE - NSFOLLOWUPINSTRUCTIONS_ED_ALL_ED_FT
Follow up with your PMD within 48-72 hrs. Show copies of your reports given to you.   Worsening, continued or new concerning symptoms return to the emergency department.    You have been given information necessary to follow up with the  [facility] ([facility]) Crisis center & other outpatient  psychiatric clinics within your community    • [facility] walk in Crisis centre  [phone] [place] [address]  [phone] [email or web address]  Hours of operation:  Day	                                        Hours  Sunday                                  Closed  Monday                                9am - 3pm  Tuesday                                9am - 3pm  Wednesday                          9am - 3pm  Thursday                               9am - 3pm  Friday                                    9am - 3pm  Saturday                                Closed

## 2024-06-09 NOTE — ED BEHAVIORAL HEALTH ASSESSMENT NOTE - DESCRIPTION
Since Pt's  ED arrival, Pt has been calm and cooperative.  There has been no occurrence of agitation/aggressive behavior.  No current verbalization of active/ passive SI/HI.   There are no signs/symptoms of severe MDD/ not psychomotorically retarded, not severely anxious, not acutely manic or floridly psychotic. Pt is not showing any signs/symptoms of intoxication or withdrawal.  Pt is not delirious.. has not tested limits.. Has maintained appropriate boundaries. Pt has been easily redirected.  NO PRN meds given.  Overall, there has been no management issues.      Vital Signs Last 24 Hrs  T(C): 36.8 (27 Feb 2024 20:33), Max: 36.9 (27 Feb 2024 12:15)  T(F): 98.2 (27 Feb 2024 20:33), Max: 98.4 (27 Feb 2024 12:15)  HR: 96 (27 Feb 2024 20:33) (73 - 96)  BP: 116/83 (27 Feb 2024 20:33) (116/83 - 135/86)  BP(mean): --  RR: 18 (27 Feb 2024 20:33) (16 - 18)  SpO2: 98% (27 Feb 2024 20:33) (98% - 100%)    Parameters below as of 27 Feb 2024 20:33  Patient On (Oxygen Delivery Method): room air asthma, DM, BPH, urinary retention, GERD, hypothyroidism, HLD, HTN, and myopia. single, claimed "used to be  to now ex-fiance of 2 yrs". likes boxing, watching TV, listening to music. practicing Bahai. also claims to be "part Buddhism". no reported access to guns. Patient has been calm and cooperative in ED.    Vital Signs Last 24 Hrs  T(C): 36.6 (09 Jun 2024 18:16), Max: 36.6 (09 Jun 2024 18:16)  T(F): 97.9 (09 Jun 2024 18:16), Max: 97.9 (09 Jun 2024 18:16)  HR: 90 (09 Jun 2024 18:16) (90 - 90)  BP: 105/50 (09 Jun 2024 18:16) (105/50 - 105/50)  BP(mean): --  RR: 16 (09 Jun 2024 18:16) (16 - 16)  SpO2: 95% (09 Jun 2024 18:16) (95% - 95%)    Parameters below as of 09 Jun 2024 18:16  Patient On (Oxygen Delivery Method): room air single, domiciled at [facility]. likes boxing, watching TV, listening to music. practicing Sikhism. also claims to be "part Judaism". no reported access to guns.
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
## 2024-06-09 NOTE — ED BEHAVIORAL HEALTH ASSESSMENT NOTE - PSYCHIATRIC ISSUES AND PLAN (INCLUDE STANDING AND PRN MEDICATION)
Depakote ER 500mg qAM and 1000mg qHS, Risperdal 4mg BID, Remeron 22.5mg qHS, Buspar 15mg qHS; PRNs: Thorazine 50mg q6hr PRN & Benadryl 50mg q6hr PRN Depakote ER 500mg qAM and 1000mg qHS, Risperdal 4mg BID, Remeron 22.5mg qHS, Buspar 15mg qHS, Clonidine 0.2mg TID; PRNs: Thorazine 50mg q6hr PRN & Benadryl 50mg q6hr PRN

## 2024-06-09 NOTE — ED BEHAVIORAL HEALTH ASSESSMENT NOTE - MEDICAL ISSUES AND PLAN (INCLUDE STANDING AND PRN MEDICATION)
follow-up with group home staff Levofloxacin 750mg qD, Tamsulosin 0.4mg qD, Testosterone gel, Atorvastatin 10mg qD, Colace, Levothyroxine 50mcg qD, Loratadine 10mg qD, Metformin 1000mg BID, Pantoprazole 20mg qD
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
## 2024-06-09 NOTE — ED PROVIDER NOTE - PROGRESS NOTE DETAILS
SAUNDRA Valadez: Patient complaining of sharp pelvic pain. Reports he is having urine retention. Reports history of retention requiring mckeon catheter at times. Patient admits to urinating 30 minutes ago. Bladder bedside Ultrasound shows 214cc. SAUNDRA Lake- pt reevaluated by psych recommending out patient follow up . 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

## 2024-06-09 NOTE — ED BEHAVIORAL HEALTH ASSESSMENT NOTE - GENERAL APPEARANCE
no chest pain/no palpitations not internally stimulated; + tattoo both forearms/No deformities present
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
## 2024-06-09 NOTE — ED BEHAVIORAL HEALTH ASSESSMENT NOTE - OTHER PAST PSYCHIATRIC HISTORY (INCLUDE DETAILS REGARDING ONSET, COURSE OF ILLNESS, INPATIENT/OUTPATIENT TREATMENT)
Per chart review, with multiple psych ED visits for medical and psychiatric complaints, was recently evaluated in ED by psych for similar complaints on 2/22/24, 2/6/24, 12/28/23, 11/21/23, 11/05/23, 09/27/23, 9/14/23, 8/20/23, 7/24/23, 2/6/2024, and 2/27/2024  - med ED evaluation noted that Pt was initially unable to urine and required Sarabia cath;   Sarabia was removed and Pt subsequently able to void on his own     has multiple psych admissions + multiple psych ED visits  no reported hx of SA but does have multiple episodes of NSSIB  admits non adherence to meds Per chart review, with multiple psych ED visits for medical and psychiatric complaints and multiple prior hospitalizations (last at [facility] in 07/2023). no reported hx of SA but does have multiple episodes of NSSIB, prior non adherence to meds but reports current compliance.

## 2024-06-09 NOTE — ED BEHAVIORAL HEALTH ASSESSMENT NOTE - PAST PSYCHOTROPIC MEDICATION
as per chart review - multiple meds trial to include Zyprexa,  Haldol, Clonidine, Topamax, Klonopin, Seroquel, Thorazine, Abilify, Trazodone, Gabapentin, Tegretol, Trileptal, Perphenazine, Prozac, Lithium, Zoloft and atarax as per chart review - multiple meds trial to include Zyprexa,  Haldol, Clonidine, Topamax, Klonopin, Seroquel, Thorazine, Abilify, Trazodone, Gabapentin, Tegretol, Trileptal, Perphenazine, Prozac, Lithium, Zoloft, Guanfacine

## 2024-06-09 NOTE — ED BEHAVIORAL HEALTH ASSESSMENT NOTE - ADDITIONAL DETAILS ALL
with hx of reported self cutting on 2/22/24 as per chart review ; tonight, had engaged in cutting left forearm using a plastic knife Patient reports SI as a result of CAH instructing him to do so. No reported hx of SA, endorses recent SIB via cutting.

## 2024-06-10 ENCOUNTER — NON-APPOINTMENT (OUTPATIENT)
Age: 34
End: 2024-06-10

## 2024-06-10 VITALS
TEMPERATURE: 98 F | HEART RATE: 88 BPM | OXYGEN SATURATION: 100 % | SYSTOLIC BLOOD PRESSURE: 122 MMHG | RESPIRATION RATE: 18 BRPM | DIASTOLIC BLOOD PRESSURE: 80 MMHG

## 2024-06-10 LAB
ADD ON TEST-SPECIMEN IN LAB: SIGNIFICANT CHANGE UP
SARS-COV-2 RNA SPEC QL NAA+PROBE: SIGNIFICANT CHANGE UP

## 2024-06-10 PROCEDURE — 99215 OFFICE O/P EST HI 40 MIN: CPT

## 2024-06-10 RX ADMIN — PANTOPRAZOLE SODIUM 40 MILLIGRAM(S): 20 TABLET, DELAYED RELEASE ORAL at 06:11

## 2024-06-10 RX ADMIN — Medication 0.2 MILLIGRAM(S): at 16:05

## 2024-06-10 RX ADMIN — Medication 0.2 MILLIGRAM(S): at 07:38

## 2024-06-10 RX ADMIN — RISPERIDONE 4 MILLIGRAM(S): 4 TABLET ORAL at 07:37

## 2024-06-10 RX ADMIN — Medication 50 MICROGRAM(S): at 06:10

## 2024-06-10 RX ADMIN — DIVALPROEX SODIUM 500 MILLIGRAM(S): 500 TABLET, DELAYED RELEASE ORAL at 13:54

## 2024-06-10 RX ADMIN — METFORMIN HYDROCHLORIDE 1000 MILLIGRAM(S): 850 TABLET ORAL at 07:38

## 2024-06-10 RX ADMIN — LORATADINE 10 MILLIGRAM(S): 10 TABLET ORAL at 13:54

## 2024-06-10 NOTE — ED BEHAVIORAL HEALTH PROGRESS NOTE - STANDING MEDS RECEIVED IN ED DETAILS FREE TEXT
lipitor, thorazine, clonidine, depakote, synthroid, metformin, remeron, protonix, risperdal, flomax
lipitor, thorazine, clonidine, depakote, synthroid, metformin, remeron, protonix, risperdal, flomax

## 2024-06-10 NOTE — ED BEHAVIORAL HEALTH NOTE - BEHAVIORAL HEALTH NOTE
No beds at [facility], [facility], [facility], , [facility].

## 2024-06-10 NOTE — ED BEHAVIORAL HEALTH PROGRESS NOTE - GENERAL APPEARANCE
not internally stimulated; + tattoo both forearms/No deformities present
not internally stimulated; + tattoo both forearms/No deformities present
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
## 2024-06-10 NOTE — ED BEHAVIORAL HEALTH PROGRESS NOTE - DETAILS
Discussed with group home staff boarding reports CAH instructing him to kill himself and harm others

## 2024-06-10 NOTE — ED ADULT NURSE REASSESSMENT NOTE - NS ED NURSE REASSESS COMMENT FT1
Pt sleeping comfortably in bed, respirations are even and unlabored. VSS. NAD. Pt awaiting psych bed assignment

## 2024-06-10 NOTE — ED BEHAVIORAL HEALTH NOTE - BEHAVIORAL HEALTH NOTE
Per provider, SAUNDRA Lake, patient is cleared and is able to return to their previous residence, [facility] [address] at [address] [address].  has spoken to EMILIE Braydon ([phone]),  confirmed that patients mode of transportation is AMBULANCE and that patient travels WITH SUPERVISION. Clinical provider is in agreement with AMBULANCE back to [facility]. Verbal huddle regarding coordination of care completed with interdisciplinary team.   Transportation coordinated via nursing.  Braydon was unsure if staff is with pt or not.    Pt’s address is  [facility] [address].    Braydon unsure of pt's next appointment but agreed to encourage staff to reach out to pt's provider to f/u this week. Per provider, SAUNDRA aLke, patient is cleared and is able to return to their previous residence, [facility] located at [address] [address].  has spoken to EMILIE Braydon ([phone]),  confirmed that patients mode of transportation is [employer] and that patient travels WITH staff SUPERVISION. Clinical provider is in agreement with [employer] back to group home. Verbal huddle regarding coordination of care completed with interdisciplinary team.   Transportation coordinated via [facility] staff.    Pt’s address is  [address] [address].    Braydon unsure of pt's next appointment but agreed to encourage staff to reach out to pt's provider to f/u this week.

## 2024-06-10 NOTE — ED BEHAVIORAL HEALTH PROGRESS NOTE - BILLING CODES
17290-[id number] hospital care - low complexity 20-29 minutes
[id number]-Wpzdywfsro hospital care - high complexity 40-54 minutes
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
## 2024-06-10 NOTE — ED BEHAVIORAL HEALTH PROGRESS NOTE - SAFETY PLAN ADDT'L DETAILS
Safety plan discussed with.../Education provided regarding environmental safety / lethal means restriction/Provision of National Suicide Prevention Lifeline 1-909-137-INRO (9499)

## 2024-06-10 NOTE — ED BEHAVIORAL HEALTH PROGRESS NOTE - RISK MITIGATION STRATEGIES IMPLEMENTED DETAILS FREE TEXT
Testicular cancer s/p left orchiectomy on 12/2022  Repeat CT scan in January 2023 with enlarging lymph node.  Began Chemo in March 2023: Etoposide and Cisplatin with plan for 4 cycles, 5 days every 21 days  Did not complete chemo treatment due to adverse effects  After 3rd cycle reported double vision, labile affect and right sided weakness and therefore chemotherapy was stopped on 5/26/23  Outpatient follow up with Heme/Onc   
enhanced supervision and patient engagement
enhanced supervision and patient engagement. Patient completed safety plan. See file.

## 2024-06-10 NOTE — ED BEHAVIORAL HEALTH PROGRESS NOTE - PSYCHIATRIC ISSUES AND PLAN (INCLUDE STANDING AND PRN MEDICATION)
Depakote ER 500mg qAM and 1000mg qHS, Risperdal 4mg BID, Remeron 22.5mg qHS, Buspar 15mg qHS, Clonidine 0.2mg TID; PRNs: Thorazine 50mg q6hr PRN & Benadryl 50mg q6hr PRN

## 2024-06-10 NOTE — ED ADULT NURSE REASSESSMENT NOTE - NS ED NURSE REASSESS COMMENT FT1
Break coverage RN: Pt resting comfortably in bed, no signs of distress at this time. Respirations are even and unlabored, no signs of respiratory distress.

## 2024-06-10 NOTE — ED BEHAVIORAL HEALTH PROGRESS NOTE - MEDICAL ISSUES AND PLAN (INCLUDE STANDING AND PRN MEDICATION)
Levofloxacin 750mg qD, Tamsulosin 0.4mg qD, Testosterone gel, Atorvastatin 10mg qD, Colace, Levothyroxine 50mcg qD, Loratadine 10mg qD, Metformin 1000mg BID, Pantoprazole 20mg qD

## 2024-06-10 NOTE — ED BEHAVIORAL HEALTH PROGRESS NOTE - TRANSFER ATTEMPTS TO INPATIENT BH SINCE LAST ASSESSMENT
[facility].../[facility].../[facility]...
[facility].../[facility].../[facility]...

## 2024-06-10 NOTE — ED BEHAVIORAL HEALTH PROGRESS NOTE - AXIS III
asthma, DM, BPH, urinary retention, GERD, hypothyroidism, HLD, HTN, and myopia.
asthma, DM, BPH, urinary retention, GERD, hypothyroidism, HLD, HTN, and myopia.

## 2024-06-11 ENCOUNTER — INPATIENT (INPATIENT)
Facility: HOSPITAL | Age: 34
LOS: 1 days | Discharge: HOME | End: 2024-06-13
Attending: INTERNAL MEDICINE | Admitting: INTERNAL MEDICINE
Payer: MEDICAID

## 2024-06-11 VITALS
RESPIRATION RATE: 15 BRPM | WEIGHT: 240.08 LBS | TEMPERATURE: 98 F | HEIGHT: 71 IN | SYSTOLIC BLOOD PRESSURE: 135 MMHG | DIASTOLIC BLOOD PRESSURE: 91 MMHG | HEART RATE: 77 BPM | OXYGEN SATURATION: 100 %

## 2024-06-11 DIAGNOSIS — Z90.79 ACQUIRED ABSENCE OF OTHER GENITAL ORGAN(S): Chronic | ICD-10-CM

## 2024-06-11 PROCEDURE — 99285 EMERGENCY DEPT VISIT HI MDM: CPT

## 2024-06-11 NOTE — ED BEHAVIORAL HEALTH NOTE - BEHAVIORAL HEALTH NOTE
high risk log:  worker called patient's group home and spoke with support staff Alee who gave phone to patient. worker encouraged patient with his safety plan. worker then spoke to Alee who was encouraged that patient should follow up with his outpatient psychiatrist.
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
## 2024-06-11 NOTE — ED PROVIDER NOTE - SECONDARY DIAGNOSIS.
[facility] - [place] [id number] [place] [address]    ASSESSMENT/PLAN: Megan Layton is a 57 y.o.  who presents for annual gynecologic exam.    Encounter for routine gynecologic examination  - Routine well woman exam completed today.  - Cervical Cancer Screening: Current ASCCP Guidelines reviewed. Last Pap: Not on file . History of abnormal: No.  - STI screening offered including HIV testing: offered, pt declined  - Breast Cancer Screening: Last Mammogram 2024, Normal  - Colorectal cancer screening was not ordered.  - Osteoporosis screening:   The patient is at increased  risk of osteoporosis based on history of tobacco use.   - The following were reviewed in today's visit: breast self exam, mammography screening ordered, menopause, osteoporosis, adequate intake of calcium and vitamin D, exercise, healthy diet, tobacco cessation, and DEXA ordered    Additional problems addressed during this visit:  1. Encounter for screening mammogram for malignant neoplasm of breast  -     Mammo screening bilateral w 3d & cad; Future  -     IGP, Aptima HPV, Rfx 16/18,45  2. Screening for cervical cancer  -     Ambulatory Referral to Obstetrics / Gynecology  -     IGP, Aptima HPV, Rfx 16/18,45  3. Osteoporosis screening  -     DXA bone density spine hip and pelvis; Future  4. Vaginal dryness, menopausal  -     estradiol (ESTRACE VAGINAL) 0.1 mg/g vaginal cream; Insert 1 g into the vagina daily for 14 days Then 1 g twice weekly  5. Routine gynecological examination  6. Vulvar itching  -     clobetasol (TEMOVATE) 0.05 % cream; Apply topically 2 (two) times a day Use as as needed up to twice a day for itching, for up to 14 days    Normal gyn exam  Return visit 1 year and prn    CC:  Annual Gynecologic Examination    HPI: Megan Layton is a 57 y.o.  who presents for annual gynecologic examination. C/o vaginal pain and dryness with intercourse. Menopausal x 7-8 years. Also having some chronic  itching near vulva. Sees PCP for routine care. Has Cologuard ordered. Recently had mammogram, normal. Same partner x 20 years, feels safe. Healthy diet, exercises. Sees specialist for hyperthyroid, due for labs now, has slip. Works in restaurant.  No children.     ROS: Negative except as noted in HPI    No LMP recorded. Patient is postmenopausal.       She  reports being sexually active. She reports using the following method of birth control/protection: Post-menopausal.       The following portions of the patient's history were reviewed and updated as appropriate:  Past Medical History:   Diagnosis Date    Disease of thyroid gland     IBS (irritable bowel syndrome)     last assessed 01/16/2015     Past Surgical History:   Procedure Laterality Date    TONSILLECTOMY AND ADENOIDECTOMY      WISDOM TOOTH EXTRACTION       Family History   Problem Relation Age of Onset    Hypertension Mother     Lung cancer Father         unknown age of onset    No Known Problems Maternal Grandmother     No Known Problems Maternal Grandfather     No Known Problems Paternal Grandmother     No Known Problems Paternal Grandfather     No Known Problems Half-Sister     No Known Problems Maternal Aunt     No Known Problems Maternal Aunt     No Known Problems Maternal Aunt     No Known Problems Paternal Aunt     No Known Problems Paternal Aunt      Social History     Socioeconomic History    Marital status: Single     Spouse name: None    Number of children: None    Years of education: None    Highest education level: None   Occupational History    None   Tobacco Use    Smoking status: Some Days     Current packs/day: 0.50     Average packs/day: 0.5 packs/day for 35.0 years (17.5 ttl pk-yrs)     Types: Cigarettes    Smokeless tobacco: Never   Vaping Use    Vaping status: Never Used   Substance and Sexual Activity    Alcohol use: Yes     Alcohol/week: 2.0 standard drinks of alcohol     Types: 2 Cans of beer per week    Drug use: Not Currently      "Comment: occasionally smoking to stimulate her appetite.      Sexual activity: Yes     Birth control/protection: Post-menopausal   Other Topics Concern    None   Social History Narrative    None     Social Determinants of Health     Financial Resource Strain: Not on file   Food Insecurity: Not on file   Transportation Needs: Not on file   Physical Activity: Not on file   Stress: Not on file   Social Connections: Not on file   Intimate Partner Violence: Not on file   Housing Stability: Not on file     Outpatient Medications Marked as Taking for the 6/11/24 encounter (Office Visit) with JYOTSNA Cano   Medication    clobetasol (TEMOVATE) 0.05 % cream    estradiol (ESTRACE VAGINAL) 0.1 mg/g vaginal cream     No Known Allergies        Objective:  /62 (BP Location: Left arm, Patient Position: Sitting, Cuff Size: Standard)   Ht 5' 5.75\" (1.67 m)   Wt 59.9 kg (132 lb)   BMI 21.47 kg/m²        Chaperone present? Declines.    General Appearance: alert and oriented, in no acute distress.   Neck/Thyroid: No thyromegaly, no thyroid nodules.  Respiratory: Unlabored breathing.  Cardiovascular: No peripheral edema.  Abdomen: Soft, non-tender, non-distended, no masses, no rebound or guarding.  Breast Exam: No dimpling, nipple retraction or discharge. No lumps or masses. No axillary or supraclavicular nodes.   Pelvic:       External genitalia: Normal appearance, no abnormal pigmentation, no lesions or masses. Normal [place]'s and [place]'s. Area of concern noted, left side of perineum/buttock, where pt states is always itching, scratch marks noted but no rashes or lesions.      Urinary system: Urethral meatus normal, bladder non-tender.      Vaginal: normal mucosa without prolapse or lesions, atrophic.      Cervix: Normal-appearing, well-epithelialized, no gross lesions or masses. No cervical motion tenderness.      Adnexa: No adnexal masses or tenderness noted.      Uterus: Normal-sized, regular contour, midline, " mobile, no uterine tenderness.  Rectovaginal: External hemorrhoids noted, internal exam deferred.   Extremities: Normal range of motion. Warm, well-perfused, non-tender.   Skin: normal, no rash or abnormalities  Neurologic: alert, oriented x3  Psychiatric: Appropriate affect, mood stable, cooperative with exam.    JYOTSNA Cano  6/12/2024 10:35 AM   Rt groin pain

## 2024-06-12 DIAGNOSIS — Z29.9 ENCOUNTER FOR PROPHYLACTIC MEASURES, UNSPECIFIED: ICD-10-CM

## 2024-06-12 DIAGNOSIS — E11.9 TYPE 2 DIABETES MELLITUS WITHOUT COMPLICATIONS: ICD-10-CM

## 2024-06-12 DIAGNOSIS — E03.9 HYPOTHYROIDISM, UNSPECIFIED: ICD-10-CM

## 2024-06-12 DIAGNOSIS — Z87.438 PERSONAL HISTORY OF OTHER DISEASES OF MALE GENITAL ORGANS: ICD-10-CM

## 2024-06-12 DIAGNOSIS — F31.9 BIPOLAR DISORDER, UNSPECIFIED: ICD-10-CM

## 2024-06-12 DIAGNOSIS — M25.571 PAIN IN RIGHT ANKLE AND JOINTS OF RIGHT FOOT: ICD-10-CM

## 2024-06-12 DIAGNOSIS — F41.1 GENERALIZED ANXIETY DISORDER: ICD-10-CM
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
## 2024-06-12 LAB
ALBUMIN SERPL ELPH-MCNC: 3.6 G/DL — SIGNIFICANT CHANGE UP (ref 3.3–5)
ALBUMIN SERPL ELPH-MCNC: 3.6 G/DL — SIGNIFICANT CHANGE UP (ref 3.3–5)
ALP SERPL-CCNC: 95 U/L — SIGNIFICANT CHANGE UP (ref 40–120)
ALP SERPL-CCNC: 99 U/L — SIGNIFICANT CHANGE UP (ref 40–120)
ALT FLD-CCNC: 34 U/L — SIGNIFICANT CHANGE UP (ref 4–41)
ALT FLD-CCNC: 52 U/L — HIGH (ref 4–41)
ANION GAP SERPL CALC-SCNC: 12 MMOL/L — SIGNIFICANT CHANGE UP (ref 7–14)
ANION GAP SERPL CALC-SCNC: 9 MMOL/L — SIGNIFICANT CHANGE UP (ref 7–14)
AST SERPL-CCNC: 20 U/L — SIGNIFICANT CHANGE UP (ref 4–40)
AST SERPL-CCNC: 40 U/L — SIGNIFICANT CHANGE UP (ref 4–40)
BASOPHILS # BLD AUTO: 0.02 K/UL — SIGNIFICANT CHANGE UP (ref 0–0.2)
BASOPHILS # BLD AUTO: 0.03 K/UL — SIGNIFICANT CHANGE UP (ref 0–0.2)
BASOPHILS NFR BLD AUTO: 0.4 % — SIGNIFICANT CHANGE UP (ref 0–2)
BASOPHILS NFR BLD AUTO: 0.5 % — SIGNIFICANT CHANGE UP (ref 0–2)
BILIRUB SERPL-MCNC: <0.2 MG/DL — SIGNIFICANT CHANGE UP (ref 0.2–1.2)
BILIRUB SERPL-MCNC: <0.2 MG/DL — SIGNIFICANT CHANGE UP (ref 0.2–1.2)
BLD GP AB SCN SERPL QL: NEGATIVE — SIGNIFICANT CHANGE UP
BUN SERPL-MCNC: 10 MG/DL — SIGNIFICANT CHANGE UP (ref 7–23)
BUN SERPL-MCNC: 11 MG/DL — SIGNIFICANT CHANGE UP (ref 7–23)
CALCIUM SERPL-MCNC: 9.1 MG/DL — SIGNIFICANT CHANGE UP (ref 8.4–10.5)
CALCIUM SERPL-MCNC: 9.3 MG/DL — SIGNIFICANT CHANGE UP (ref 8.4–10.5)
CHLORIDE SERPL-SCNC: 101 MMOL/L — SIGNIFICANT CHANGE UP (ref 98–107)
CHLORIDE SERPL-SCNC: 103 MMOL/L — SIGNIFICANT CHANGE UP (ref 98–107)
CO2 SERPL-SCNC: 26 MMOL/L — SIGNIFICANT CHANGE UP (ref 22–31)
CO2 SERPL-SCNC: 26 MMOL/L — SIGNIFICANT CHANGE UP (ref 22–31)
CREAT SERPL-MCNC: 0.66 MG/DL — SIGNIFICANT CHANGE UP (ref 0.5–1.3)
CREAT SERPL-MCNC: 0.67 MG/DL — SIGNIFICANT CHANGE UP (ref 0.5–1.3)
EGFR: 125 ML/MIN/1.73M2 — SIGNIFICANT CHANGE UP
EGFR: 125 ML/MIN/1.73M2 — SIGNIFICANT CHANGE UP
EOSINOPHIL # BLD AUTO: 0.31 K/UL — SIGNIFICANT CHANGE UP (ref 0–0.5)
EOSINOPHIL # BLD AUTO: 0.34 K/UL — SIGNIFICANT CHANGE UP (ref 0–0.5)
EOSINOPHIL NFR BLD AUTO: 5.8 % — SIGNIFICANT CHANGE UP (ref 0–6)
EOSINOPHIL NFR BLD AUTO: 5.8 % — SIGNIFICANT CHANGE UP (ref 0–6)
GLUCOSE BLDC GLUCOMTR-MCNC: 147 MG/DL — HIGH (ref 70–99)
GLUCOSE BLDC GLUCOMTR-MCNC: 152 MG/DL — HIGH (ref 70–99)
GLUCOSE SERPL-MCNC: 121 MG/DL — HIGH (ref 70–99)
GLUCOSE SERPL-MCNC: 133 MG/DL — HIGH (ref 70–99)
HCT VFR BLD CALC: 37.7 % — LOW (ref 39–50)
HCT VFR BLD CALC: 39.3 % — SIGNIFICANT CHANGE UP (ref 39–50)
HGB BLD-MCNC: 12.1 G/DL — LOW (ref 13–17)
HGB BLD-MCNC: 12.7 G/DL — LOW (ref 13–17)
IANC: 2.37 K/UL — SIGNIFICANT CHANGE UP (ref 1.8–7.4)
IANC: 2.44 K/UL — SIGNIFICANT CHANGE UP (ref 1.8–7.4)
IMM GRANULOCYTES NFR BLD AUTO: 0.9 % — SIGNIFICANT CHANGE UP (ref 0–0.9)
IMM GRANULOCYTES NFR BLD AUTO: 1.2 % — HIGH (ref 0–0.9)
LYMPHOCYTES # BLD AUTO: 2.11 K/UL — SIGNIFICANT CHANGE UP (ref 1–3.3)
LYMPHOCYTES # BLD AUTO: 2.4 K/UL — SIGNIFICANT CHANGE UP (ref 1–3.3)
LYMPHOCYTES # BLD AUTO: 39.4 % — SIGNIFICANT CHANGE UP (ref 13–44)
LYMPHOCYTES # BLD AUTO: 41.1 % — SIGNIFICANT CHANGE UP (ref 13–44)
MCHC RBC-ENTMCNC: 24.9 PG — LOW (ref 27–34)
MCHC RBC-ENTMCNC: 25 PG — LOW (ref 27–34)
MCHC RBC-ENTMCNC: 32.1 GM/DL — SIGNIFICANT CHANGE UP (ref 32–36)
MCHC RBC-ENTMCNC: 32.3 GM/DL — SIGNIFICANT CHANGE UP (ref 32–36)
MCV RBC AUTO: 77.4 FL — LOW (ref 80–100)
MCV RBC AUTO: 77.7 FL — LOW (ref 80–100)
MONOCYTES # BLD AUTO: 0.5 K/UL — SIGNIFICANT CHANGE UP (ref 0–0.9)
MONOCYTES # BLD AUTO: 0.56 K/UL — SIGNIFICANT CHANGE UP (ref 0–0.9)
MONOCYTES NFR BLD AUTO: 9.3 % — SIGNIFICANT CHANGE UP (ref 2–14)
MONOCYTES NFR BLD AUTO: 9.6 % — SIGNIFICANT CHANGE UP (ref 2–14)
MRSA PCR RESULT.: SIGNIFICANT CHANGE UP
NEUTROPHILS # BLD AUTO: 2.37 K/UL — SIGNIFICANT CHANGE UP (ref 1.8–7.4)
NEUTROPHILS # BLD AUTO: 2.44 K/UL — SIGNIFICANT CHANGE UP (ref 1.8–7.4)
NEUTROPHILS NFR BLD AUTO: 41.8 % — LOW (ref 43–77)
NEUTROPHILS NFR BLD AUTO: 44.2 % — SIGNIFICANT CHANGE UP (ref 43–77)
NRBC # BLD: 0 /100 WBCS — SIGNIFICANT CHANGE UP (ref 0–0)
NRBC # BLD: 0 /100 WBCS — SIGNIFICANT CHANGE UP (ref 0–0)
NRBC # FLD: 0 K/UL — SIGNIFICANT CHANGE UP (ref 0–0)
NRBC # FLD: 0 K/UL — SIGNIFICANT CHANGE UP (ref 0–0)
PLATELET # BLD AUTO: 211 K/UL — SIGNIFICANT CHANGE UP (ref 150–400)
PLATELET # BLD AUTO: 226 K/UL — SIGNIFICANT CHANGE UP (ref 150–400)
POTASSIUM SERPL-MCNC: 4.2 MMOL/L — SIGNIFICANT CHANGE UP (ref 3.5–5.3)
POTASSIUM SERPL-MCNC: 4.5 MMOL/L — SIGNIFICANT CHANGE UP (ref 3.5–5.3)
POTASSIUM SERPL-SCNC: 4.2 MMOL/L — SIGNIFICANT CHANGE UP (ref 3.5–5.3)
POTASSIUM SERPL-SCNC: 4.5 MMOL/L — SIGNIFICANT CHANGE UP (ref 3.5–5.3)
PROT SERPL-MCNC: 6.2 G/DL — SIGNIFICANT CHANGE UP (ref 6–8.3)
PROT SERPL-MCNC: 6.3 G/DL — SIGNIFICANT CHANGE UP (ref 6–8.3)
RBC # BLD: 4.85 M/UL — SIGNIFICANT CHANGE UP (ref 4.2–5.8)
RBC # BLD: 5.08 M/UL — SIGNIFICANT CHANGE UP (ref 4.2–5.8)
RBC # FLD: 14.1 % — SIGNIFICANT CHANGE UP (ref 10.3–14.5)
RBC # FLD: 14.4 % — SIGNIFICANT CHANGE UP (ref 10.3–14.5)
RH IG SCN BLD-IMP: POSITIVE — SIGNIFICANT CHANGE UP
S AUREUS DNA NOSE QL NAA+PROBE: SIGNIFICANT CHANGE UP
SODIUM SERPL-SCNC: 138 MMOL/L — SIGNIFICANT CHANGE UP (ref 135–145)
SODIUM SERPL-SCNC: 139 MMOL/L — SIGNIFICANT CHANGE UP (ref 135–145)
WBC # BLD: 5.36 K/UL — SIGNIFICANT CHANGE UP (ref 3.8–10.5)
WBC # BLD: 5.84 K/UL — SIGNIFICANT CHANGE UP (ref 3.8–10.5)
WBC # FLD AUTO: 5.36 K/UL — SIGNIFICANT CHANGE UP (ref 3.8–10.5)
WBC # FLD AUTO: 5.84 K/UL — SIGNIFICANT CHANGE UP (ref 3.8–10.5)

## 2024-06-12 PROCEDURE — 73120 X-RAY EXAM OF HAND: CPT | Mod: 26,RT

## 2024-06-12 PROCEDURE — 73700 CT LOWER EXTREMITY W/O DYE: CPT | Mod: 26,RT

## 2024-06-12 PROCEDURE — 73610 X-RAY EXAM OF ANKLE: CPT | Mod: 26,RT

## 2024-06-12 PROCEDURE — 99223 1ST HOSP IP/OBS HIGH 75: CPT | Mod: GC

## 2024-06-12 PROCEDURE — 73100 X-RAY EXAM OF WRIST: CPT | Mod: 26,RT

## 2024-06-12 RX ORDER — LORATADINE 10 MG/1
10 TABLET ORAL DAILY
Refills: 0 | Status: DISCONTINUED | OUTPATIENT
Start: 2024-06-12 | End: 2024-06-13

## 2024-06-12 RX ORDER — CHLORHEXIDINE GLUCONATE 213 G/1000ML
1 SOLUTION TOPICAL DAILY
Refills: 0 | Status: DISCONTINUED | OUTPATIENT
Start: 2024-06-12 | End: 2024-06-13

## 2024-06-12 RX ORDER — ACETAMINOPHEN 500 MG
650 TABLET ORAL EVERY 6 HOURS
Refills: 0 | Status: DISCONTINUED | OUTPATIENT
Start: 2024-06-12 | End: 2024-06-13

## 2024-06-12 RX ORDER — PANTOPRAZOLE SODIUM 20 MG/1
1 TABLET, DELAYED RELEASE ORAL
Refills: 0 | DISCHARGE

## 2024-06-12 RX ORDER — DEXTROSE 50 % IN WATER 50 %
15 SYRINGE (ML) INTRAVENOUS ONCE
Refills: 0 | Status: DISCONTINUED | OUTPATIENT
Start: 2024-06-12 | End: 2024-06-13

## 2024-06-12 RX ORDER — RISPERIDONE 4 MG/1
4 TABLET ORAL EVERY 12 HOURS
Refills: 0 | Status: DISCONTINUED | OUTPATIENT
Start: 2024-06-12 | End: 2024-06-13

## 2024-06-12 RX ORDER — LEVOTHYROXINE SODIUM 125 MCG
50 TABLET ORAL DAILY
Refills: 0 | Status: DISCONTINUED | OUTPATIENT
Start: 2024-06-12 | End: 2024-06-13

## 2024-06-12 RX ORDER — GLUCAGON INJECTION, SOLUTION 0.5 MG/.1ML
1 INJECTION, SOLUTION SUBCUTANEOUS ONCE
Refills: 0 | Status: DISCONTINUED | OUTPATIENT
Start: 2024-06-12 | End: 2024-06-13

## 2024-06-12 RX ORDER — DEXTROSE 50 % IN WATER 50 %
12.5 SYRINGE (ML) INTRAVENOUS ONCE
Refills: 0 | Status: DISCONTINUED | OUTPATIENT
Start: 2024-06-12 | End: 2024-06-13

## 2024-06-12 RX ORDER — DEXTROSE 50 % IN WATER 50 %
25 SYRINGE (ML) INTRAVENOUS ONCE
Refills: 0 | Status: DISCONTINUED | OUTPATIENT
Start: 2024-06-12 | End: 2024-06-13

## 2024-06-12 RX ORDER — INSULIN LISPRO 100/ML
VIAL (ML) SUBCUTANEOUS AT BEDTIME
Refills: 0 | Status: DISCONTINUED | OUTPATIENT
Start: 2024-06-12 | End: 2024-06-13

## 2024-06-12 RX ORDER — DOCUSATE SODIUM 100 MG
1 CAPSULE ORAL
Refills: 0 | DISCHARGE

## 2024-06-12 RX ORDER — DIVALPROEX SODIUM 500 MG/1
1 TABLET, DELAYED RELEASE ORAL
Refills: 0 | DISCHARGE

## 2024-06-12 RX ORDER — MIRTAZAPINE 45 MG/1
1.5 TABLET, ORALLY DISINTEGRATING ORAL
Refills: 0 | DISCHARGE

## 2024-06-12 RX ORDER — PANTOPRAZOLE SODIUM 20 MG/1
40 TABLET, DELAYED RELEASE ORAL
Refills: 0 | Status: DISCONTINUED | OUTPATIENT
Start: 2024-06-12 | End: 2024-06-13

## 2024-06-12 RX ORDER — DEXTROSE 10 % IN WATER 10 %
125 INTRAVENOUS SOLUTION INTRAVENOUS ONCE
Refills: 0 | Status: DISCONTINUED | OUTPATIENT
Start: 2024-06-12 | End: 2024-06-13

## 2024-06-12 RX ORDER — TAMSULOSIN HYDROCHLORIDE 0.4 MG/1
0.4 CAPSULE ORAL AT BEDTIME
Refills: 0 | Status: DISCONTINUED | OUTPATIENT
Start: 2024-06-12 | End: 2024-06-13

## 2024-06-12 RX ORDER — GUANFACINE 3 MG/1
1 TABLET, EXTENDED RELEASE ORAL
Refills: 0 | DISCHARGE

## 2024-06-12 RX ORDER — DIVALPROEX SODIUM 500 MG/1
1000 TABLET, DELAYED RELEASE ORAL DAILY
Refills: 0 | Status: DISCONTINUED | OUTPATIENT
Start: 2024-06-12 | End: 2024-06-13

## 2024-06-12 RX ORDER — ENOXAPARIN SODIUM 100 MG/ML
40 INJECTION SUBCUTANEOUS EVERY 12 HOURS
Refills: 0 | Status: DISCONTINUED | OUTPATIENT
Start: 2024-06-12 | End: 2024-06-13

## 2024-06-12 RX ORDER — INSULIN LISPRO 100/ML
VIAL (ML) SUBCUTANEOUS
Refills: 0 | Status: DISCONTINUED | OUTPATIENT
Start: 2024-06-12 | End: 2024-06-13

## 2024-06-12 RX ORDER — SODIUM CHLORIDE 9 MG/ML
1000 INJECTION, SOLUTION INTRAVENOUS
Refills: 0 | Status: DISCONTINUED | OUTPATIENT
Start: 2024-06-12 | End: 2024-06-13

## 2024-06-12 RX ORDER — MIRTAZAPINE 45 MG/1
22.5 TABLET, ORALLY DISINTEGRATING ORAL AT BEDTIME
Refills: 0 | Status: DISCONTINUED | OUTPATIENT
Start: 2024-06-12 | End: 2024-06-13

## 2024-06-12 RX ORDER — RISPERIDONE 4 MG/1
1 TABLET ORAL
Refills: 0 | DISCHARGE

## 2024-06-12 RX ORDER — DIVALPROEX SODIUM 500 MG/1
500 TABLET, DELAYED RELEASE ORAL DAILY
Refills: 0 | Status: DISCONTINUED | OUTPATIENT
Start: 2024-06-12 | End: 2024-06-13

## 2024-06-12 RX ORDER — ATORVASTATIN CALCIUM 80 MG/1
10 TABLET, FILM COATED ORAL AT BEDTIME
Refills: 0 | Status: DISCONTINUED | OUTPATIENT
Start: 2024-06-12 | End: 2024-06-13

## 2024-06-12 RX ADMIN — TAMSULOSIN HYDROCHLORIDE 0.4 MILLIGRAM(S): 0.4 CAPSULE ORAL at 21:42

## 2024-06-12 RX ADMIN — LORATADINE 10 MILLIGRAM(S): 10 TABLET ORAL at 11:14

## 2024-06-12 RX ADMIN — ATORVASTATIN CALCIUM 10 MILLIGRAM(S): 80 TABLET, FILM COATED ORAL at 21:38

## 2024-06-12 RX ADMIN — Medication 0.2 MILLIGRAM(S): at 13:09

## 2024-06-12 RX ADMIN — DIVALPROEX SODIUM 1000 MILLIGRAM(S): 500 TABLET, DELAYED RELEASE ORAL at 17:34

## 2024-06-12 RX ADMIN — DIVALPROEX SODIUM 500 MILLIGRAM(S): 500 TABLET, DELAYED RELEASE ORAL at 11:14

## 2024-06-12 RX ADMIN — MIRTAZAPINE 22.5 MILLIGRAM(S): 45 TABLET, ORALLY DISINTEGRATING ORAL at 21:35

## 2024-06-12 RX ADMIN — Medication 15 MILLIGRAM(S): at 17:34

## 2024-06-12 RX ADMIN — Medication 50 MICROGRAM(S): at 11:14

## 2024-06-12 RX ADMIN — Medication 1: at 18:09

## 2024-06-12 RX ADMIN — ENOXAPARIN SODIUM 40 MILLIGRAM(S): 100 INJECTION SUBCUTANEOUS at 17:33

## 2024-06-12 RX ADMIN — RISPERIDONE 4 MILLIGRAM(S): 4 TABLET ORAL at 17:34

## 2024-06-12 NOTE — ED ADULT NURSE NOTE - OBJECTIVE STATEMENT
Break RN note- Patient arrives to the ED from his group home for a swollen and painful right hand and right foot after kicking and punching the ground. Patient calm and cooperative. Patient breathing even and nonlabored. No acute distress. Labs obtained. Awaiting xray. Safety maintained. Group Home staff member at bedside. Safety maintained. Patient stable upon exiting the room.

## 2024-06-12 NOTE — ED BEHAVIORAL HEALTH NOTE - BEHAVIORAL HEALTH NOTE
KEKE called pt's group home residence, [place], at [phone] for collateral information.  Call rang and voicemail came on-KEKE left a message requesting a return phone call.

## 2024-06-12 NOTE — PATIENT PROFILE ADULT - NS PRO AD NO ADVANCE DIRECTIVE
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 Trop is mildly elevated with minimal trend up and down. No chest pain or ECG changes. Seems likely related to stress of medical illness. Had normal stress echo just last month. Pt and I discussed that risk of procedure likely outweighs potential benefit without a clear cardiac issue.    No
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
## 2024-06-12 NOTE — ED ADULT NURSE NOTE - NSFALLRISKINTERV_ED_ALL_ED
Assistance OOB with selected safe patient handling equipment if applicable/Assistance with ambulation/Communicate fall risk and risk factors to all staff, patient, and family/Monitor gait and stability/Provide visual cue: yellow wristband, yellow gown, etc/Reinforce activity limits and safety measures with patient and family/Call bell, personal items and telephone in reach/Instruct patient to call for assistance before getting out of bed/chair/stretcher/Non-slip footwear applied when patient is off stretcher/[place] to call system/Physically safe environment - no spills, clutter or unnecessary equipment/Purposeful Proactive Rounding/Room/bathroom lighting operational, light cord in reach

## 2024-06-12 NOTE — ED ADULT NURSE NOTE - NS_SISCREENINGSR_GEN_ALL_ED
How Severe Is Your Skin Lesion?: moderate Have Your Skin Lesions Been Treated?: not been treated Is This A New Presentation, Or A Follow-Up?: Skin Lesions Negative

## 2024-06-12 NOTE — H&P ADULT - NSHPPHYSICALEXAM_GEN_ALL_CORE
T(C): 36.3 (06-12-24 @ 07:03), Max: 36.6 (06-11-24 @ 20:34)  HR: 76 (06-12-24 @ 07:03) (76 - 83)  BP: 108/77 (06-12-24 @ 07:03) (102/73 - 135/91)  RR: 19 (06-12-24 @ 07:03) (15 - 19)  SpO2: 98% (06-12-24 @ 07:03) (98% - 100%)    CONSTITUTIONAL: Well groomed, no apparent distress  EYES: EOMI, No conjunctival or scleral injection, non-icteric  ENMT: Oral mucosa with moist membranes.  RESP: No respiratory distress, no use of accessory muscles; CTA b/l, no WRR  CV: RRR, +S1S2, no peripheral edema  GI: Soft, NT, ND, no rebound, no guarding;   MSK: no gross deformities. R wrist limited active and passive ROM dorsiflexion and plantarflexion, able to form a fist. Pulses 2+. No skin changes, no erythema or warm. TTP of wrist with mild swelling. R ankle TTP of lateral malleolus, no gross deformities, skin changes or erythema or warmth. Limited acitve and passive dorsiflexion and plantarflexion, pulses 2+. No skin changes,  SKIN: No rashes or ulcers noted  NEURO: no focal neuro deficits,  PSYCH: Appropriate insight/judgment; A+O3, denies SI/HI

## 2024-06-12 NOTE — PATIENT PROFILE ADULT - FUNCTIONAL ASSESSMENT - BASIC MOBILITY ASSESSMENT TYPE
[facility]  Hematology/Medical Oncology Clinic       PATIENT: Juan Gillespie  MRN: [id number]  DATE: 7/13/2023    Reason for referral: Extensive stage small cell lung ca    Initial History: Juan Gillespie is a 58 year old man with extensive stage SCLC who presents to clinic for evaluation prior to treatment  of topotecan.          Oncological History:  -Started Carbo/Etop/Atez 4/5/22  -Maintenance atezolizumab 7/5/22  -Consolidative thoracic radiation 8/4/22-8/18/22  -topotecan started 10/24/23-3/10/22(missed Nov tx)-mixed resposne  -Lurbinectadin started 4/05/23 to 6/2023  -Awaiting clinical trial single agent docetaxel started    Interval History:     Pt presents for MD and ffeeling much worse than last visit with generalized fatigue and pain.  LFTS elevated and WBC low.  Fluids given today and prophylactic levaquin given.  LFTs and low WBC most likely from cancer progression an chemo.        His wife accompanies him at this visit.  Past Medical History:   Past Medical History:   Diagnosis Date    Cancer     Small Cell -Stage 4 Lung Cancer    Diabetes mellitus, type 2     Hypertension        Past Surgical HIstory:   Past Surgical History:   Procedure Laterality Date    ENDOBRONCHIAL ULTRASOUND N/A 3/22/2022    Procedure: ENDOBRONCHIAL ULTRASOUND (EBUS);  Surgeon: Kristin Samuels MD;  Location: [facility] OR [address] [place] [address];  Service: Endoscopy;  Laterality: N/A;    KNEE SURGERY         Family History:   Family History   Problem Relation Age of Onset    Diabetes Mellitus Mother     Pacemaker/defibrilator Father     Lung cancer Father        Social History:  reports that he has quit smoking. His smoking use included cigarettes. He has quit using smokeless tobacco. He reports that he does not drink alcohol and does not use drugs.    Allergies:  Review of patient's allergies indicates:  No Known Allergies    Medications:  Current Outpatient Medications   Medication Sig Dispense Refill    (Magic mouthwash) 1:1:1  diphenhydrAMINE(Benadryl) 12.5mg/5ml liq, aluminum & magnesium hydroxide-simethicone (Maalox), LIDOcaine viscous 2% Swish and spit 5 mLs every 4 (four) hours as needed. for mouth sores 450 mL 1    albuterol (ACCUNEB) 1.25 mg/3 mL Nebu Use 1 vial (1.25 mg total) by nebulization 3 (three) times daily as needed (shortness of breath). Rescue 90 mL 2    allopurinoL (ZYLOPRIM) 300 MG tablet Take 1 tablet (300 mg total) by mouth once daily. 60 tablet 3    apixaban (ELIQUIS) 5 mg Tab Take 1 tablet (5 mg total) by mouth 2 (two) times daily. 60 tablet 5    benzonatate (TESSALON) 100 MG capsule Take 1 capsule (100 mg total) by mouth 3 (three) times daily as needed for Cough. 90 capsule 3    cetirizine (ZYRTEC) 10 MG tablet Take 1 tablet by mouth as needed.      cyproheptadine (PERIACTIN) 4 mg tablet Take 1 tablet (4 mg total) by mouth 3 (three) times daily as needed (appetite). 60 tablet 1    dapagliflozin (FARXIGA) 10 mg tablet Take 1 tablet by mouth once daily.      dexAMETHasone (DECADRON) 4 MG Tab Take 1 tablet (4 mg total) by mouth 2 (two) times daily. 60 tablet 2    HYDROmorphone (DILAUDID) 2 MG tablet Take 1 tablet (2 mg total) by mouth every 6 (six) hours as needed for Pain. 42 tablet 0    insulin aspart U-100 (NOVOLOG) 100 unit/mL (3 mL) InPn pen Inject 1 Units into the skin as needed. Inject into skin if over 200.      losartan (COZAAR) 100 MG tablet Take 1 tablet (100 mg total) by mouth once daily. 30 tablet 2    morphine (MS CONTIN) 30 MG 12 hr tablet Take 1 tablet (30 mg total) by mouth every 12 (twelve) hours. 60 tablet 0    naloxone (NARCAN) 4 mg/actuation Spry 4mg by nasal route as needed for opioid overdose; may repeat every 2-3 minutes in alternating nostrils until medical help arrives. Call 911 1 each 11    ondansetron (ZOFRAN) 8 MG tablet Take 1 tablet (8 mg total) by mouth every 8 (eight) hours as needed for Nausea. 90 tablet 3    ondansetron (ZOFRAN) 8 MG tablet Take 1 tablet (8 mg total) by mouth every  12 (twelve) hours as needed for Nausea. 30 tablet 2    semaglutide (OZEMPIC) 0.25 mg or 0.5 mg(2 mg/1.5 mL) pen injector Inject 0.25 mg into the skin once a week.      senna-docusate 8.6-50 mg (PERICOLACE) 8.6-50 mg per tablet Take 1 tablet by mouth daily as needed for Constipation. 30 tablet 2    SYMBICORT 160-4.5 mcg/actuation HFAA Inhale into the lungs.      TRUE METRIX GLUCOSE TEST STRIP Strp       TRUEPLUS LANCETS 33 gauge Misc       valACYclovir (VALTREX) 1000 MG tablet Take 1 tablet (1,000 mg total) by mouth 3 (three) times daily. for seven days 21 tablet 0     No current facility-administered medications for this visit.       Review of Systems   Constitutional:  Positive for fatigue. Negative for chills and fever.        Rash   HENT:  Negative for congestion, sinus pressure and trouble swallowing.    Respiratory:  Negative for cough, chest tightness and shortness of breath.    Cardiovascular:  Negative for chest pain.   Gastrointestinal:  Negative for abdominal pain and blood in stool.   Endocrine: Negative for cold intolerance and heat intolerance.   Genitourinary:  Negative for hematuria.   Musculoskeletal:  Positive for arthralgias. Negative for back pain and myalgias.   Skin:  Negative for rash.   Neurological:  Negative for weakness.   Hematological:  Negative for adenopathy. Does not bruise/bleed easily.   Psychiatric/Behavioral:  Negative for dysphoric mood. The patient is not nervous/anxious.    ECOG Performance Status:   ECOG SCORE    1 - Restricted in strenuous activity-ambulatory and able to carry out work of a light nature         Objective:      Vitals:   There were no vitals filed for this visit.        telemedicine    Physical Exam  Constitutional:       General: He is not in acute distress.     Appearance: Normal appearance. He is not ill-appearing.   HENT:      Head: Normocephalic and atraumatic.      Nose: Nose normal.      Mouth/Throat:      Mouth: Mucous membranes are moist.      Pharynx:  Oropharynx is clear. No oropharyngeal exudate or posterior oropharyngeal erythema.   Eyes:      General: No scleral icterus.     Extraocular Movements: Extraocular movements intact.      Conjunctiva/sclera: Conjunctivae normal.      Pupils: Pupils are equal, round, and reactive to light.   Pulmonary:      Effort: Pulmonary effort is normal. No respiratory distress.   Abdominal:      General: Abdomen is flat.      Palpations: Abdomen is soft.   Musculoskeletal:         General: No swelling. Normal range of motion.      Cervical back: Normal range of motion.      Right lower leg: No edema.      Left lower leg: No edema.   Skin:     General: Skin is warm and dry.      Coloration: Skin is not jaundiced.   Neurological:      General: No focal deficit present.      Mental Status: He is alert.   Psychiatric:         Mood and Affect: Mood normal.         Behavior: Behavior normal.         Thought Content: Thought content normal.         Judgment: Judgment normal.     Laboratory Data:  Recent Results (from the past 168 hour(s))   CBC W/ AUTO DIFFERENTIAL    Collection Time: 07/13/23  7:07 AM   Result Value Ref Range    WBC 1.04 (LL) 3.90 - 12.70 K/uL    RBC 4.74 4.60 - 6.20 M/uL    Hemoglobin 13.5 (L) 14.0 - 18.0 g/dL    Hematocrit 42.7 40.0 - 54.0 %    MCV 90 82 - 98 fL    MCH 28.5 27.0 - 31.0 pg    MCHC 31.6 (L) 32.0 - 36.0 g/dL    RDW 17.1 (H) 11.5 - 14.5 %    Platelets 174 150 - 450 K/uL    MPV 10.1 9.2 - 12.9 fL   COMPREHENSIVE METABOLIC PANEL    Collection Time: 07/13/23  7:07 AM   Result Value Ref Range    Sodium 136 136 - 145 mmol/L    Potassium 4.4 3.5 - 5.1 mmol/L    Chloride 100 95 - 110 mmol/L    CO2 24 23 - 29 mmol/L    Glucose 150 (H) 70 - 110 mg/dL    BUN 18 6 - 20 mg/dL    Creatinine 1.0 0.5 - 1.4 mg/dL    Calcium 9.0 8.7 - 10.5 mg/dL    Total Protein 6.7 6.0 - 8.4 g/dL    Albumin 3.2 (L) 3.5 - 5.2 g/dL    Total Bilirubin 1.3 (H) 0.1 - 1.0 mg/dL    Alkaline Phosphatase 593 (H) 55 - 135 U/L     (H) 10 -  40 U/L     (H) 10 - 44 U/L    eGFR >60 >60 mL/min/1.73 m^2    Anion Gap 12 8 - 16 mmol/L   Magnesium    Collection Time: 07/13/23  7:07 AM   Result Value Ref Range    Magnesium 2.2 1.6 - 2.6 mg/dL           Imaging:   MRI Brain W WO Contrast    Result Date: 10/11/2022  EXAMINATION: MRI BRAIN W WO CONTRAST CLINICAL HISTORY: Small cell lung cancer, brain re-staging; Malignant neoplasm of unspecified part of unspecified bronchus or lung TECHNIQUE: Sagittal and axial T1, axial T2, axial FLAIR, axial gradient, axial diffusion imaging of the whole brain pre-contrast with postcontrast axial T1 and axial spoiled gradient imaging reformatted in the coronal and sagittal planes.. Ten ml of Gadavist injected intravenously. COMPARISON: 07/12/2022 FINDINGS: Interval 1.2 cm enhancing lesion within the left anterior inferior frontal lobe which prominently the ring-enhancing measuring 1.2 x 0.9 x 0.9 cm in AP by TV by CC dimensions respectively in light of history concerning for parenchymal metastases the with question trace susceptibility associated with this lesion. Previously identified heterogeneous enhancing sellar lesion is again seen allowing for routine brain technique with lesion measuring approximately 1.3 cm craniocaudal this may represent pituitary adenoma though indeterminate.  Previous intraluminal filling defect within the right jugular foramen is no longer seen.  There is however diffusion signal abnormality with ill-defined T1 hypointensity within the right occipital condyle concerning for possible osseous metastases the clinical correlation and further evaluation with nuclear medicine imaging advised. There is continued slight prominent leptomeningeal enhancement along the left cerebellum which raises concern for possible underlying vascular malformation such as a dural AV fistula with collateral vascular engorgement.  There is no restricted diffusion to suggest acute infarction.  Ventricles stable without  hydrocephalus.  There is mild edema signal surrounding the left frontal enhancing lesion with continued few scattered punctate size regions of T2 FLAIR signal hyperintensity elsewhere supratentorial white matter which are nonspecific and may represent mild chronic ischemic change. This report was flagged in Epic as abnormal.     Interval development of a small ring-enhancing lesion left anterior inferior frontal lobe concerning for parenchymal metastases in light of history.  Clinical correlation and follow-up advised There is continue though relatively stable heterogeneous enhancement and enlargement of the material within the sella which may represent pituitary adenoma though indeterminate. Previous right internal jugular vein thrombus no longer seen. Abnormal signal along the right occipital condyle concerning for possible osseous metastases clinical correlation and further evaluation with nuclear medicine imaging advised Continued prominent vascularity left cerebellar folia raising concern for possible underlying vascular malformation unchanged.  Further evaluation with noncontrast MRA head may be of further diagnostic value. Electronically signed by: Jonel Lawrence DO Date:    10/11/2022 Time:    10:05    CT Chest Abdomen Pelvis With Contrast (xpd)    Result Date: 10/18/2022  EXAMINATION: CT CHEST ABDOMEN PELVIS WITH CONTRAST (XPD) CLINICAL HISTORY: metastatic small cell lung cancer on maintenance immunotherapy, eval response; Malignant neoplasm of unspecified part of unspecified bronchus or lung TECHNIQUE: Low dose axial images, sagittal and coronal reformations were obtained from the thoracic inlet to the pubic symphysis following the IV administration of 100 mL of Omnipaque 350 COMPARISON: 07/26/2022 FINDINGS: Right IJ venous shunt tip superior aspect right atrium, absence of clot in included upper right IJ. Fatty infiltration of liver, additional diminished attenuation space-occupying lesions of liver,  largest central right hepatic lobe 3.6 cm image 103 series 3.  Stable.  Spleen, adrenal glands, pancreas, gallbladder and biliary tree normal. Urinary bladder normal.  Prostate gland very small 4 cm versus postop prostatectomy.  No free fluid or retroperitoneal adenopathy.  GI track normal. Tiny nonobstructive right lower and left upper renal pole stones. New lymph node left retro manubrial 3.4 cm image 28 series 3 appearing in the interim.  Paratracheal conglomerate adenopathy 2.1 x 2.5 cm, was 1.6 x 3 cm.  Subcarinal adenopathy stable.  No new hilar or mediastinal adenopathy. Degenerative disc spondylosis cervicothoracic junction., focal osteoblastic opacities involve humeral heads both clavicle superimposed on erosive DJD more prominent on right.  Additional focal osteoblastic opacities sternum, ribs, dorsal lumbar sacral spine pelvis and both femoral head and trochanteric regions.  Moderate anterior spondylosis dorsal spine and degenerative disc spondylosis facet joint arthropathy lumbosacral junction with DJD SI joints.  Fracture defects left lower anterior chest wall stable. Scattered calcified plaque great vessels aorta iliac femoral arteries. Dominant medial segment right middle lobe mass 1.1 x 2.7 cm image 69 series 3, was 1.6 x 3.4 cm.  Additional patchy nodular lesions right lower lobe posterior basilar segments, largest 1.4 cm image 66 series 3 suspicious for metastatic disease and/or superimposed inflammatory and infectious process.  Additional patchy infiltrates medial posterior segment right upper lobe and right lower lobe particularly medial basilar segment image 91 series 3.  No new pleural reaction.  Tracheobronchial tree normal. .     1. New presumed metastatic node anterior superior left mediastinum, paratracheal adenopathy otherwise stable. 2. Decreased size in right middle lobe mass with new evidence of metastatic disease right lower lobe versus superimposed inflammatory infectious process  discussed above. 3. New developing metastatic lesions liver. 4. Bony universal metastatic disease stable. 5. Recist summary: 6. Compare with previous CT chest abdomen pelvis 07/26/2022 7. Lesion 1: Right middle lobe mass 1.1 x 2.7 cm was 1.6 x 13.4 cm. 8. Lesion 2: Right paratracheal conned Willett a adenopathy 2.1 x 2.5 cm, was 1.6 x 3 cm. 9. Lesion 3: Dome 1.9 cm stable.  (New progressive liver metastatic disease noted elsewhere) 10. Lesion 4: Caudal aspect right hepatic lobe 1 cm, stable 11.  This report was flagged in Epic as abnormal. Electronically signed by: Dewayne Hutchinson MD Date:    10/18/2022 Time:    09:28       Assessment and Plan          ECOG 0      Extensive stage small cell lung cancer with brain mets  -Initially diagnosed with extensive stage small cell lung cancer in 03/2022 who was treated with carbo/etop/atezolizumab from 04/05/2022 - 06/14/22 with partial response. He subsequently completed consolidation thoracic RT 8/4/22 - 8/18/22, and he was on maintenance atezolizumab.   -10/18/22 showing progression of disease in lungs and liver.  Asymptomatic.  -Dr. Sosa discussed with him plan for topotecan and pt was consented  -Pt tolerated cycle 1 well but delayed C2 due to being out of town  -Scans in December 2022 largely stable with exception of  Right middle lobe lung mass.  3.4 cm, still demonstrated widespread disease in bones  -Admitted for pain control and palliative XRT on 12/07/22  -Pt had been off of tx in Nov 2022 due to going on vacation, as symptoms initially improved drastically after restarting tx  Plan 07/13/23  -Give 1L fluid today with B12  -Follow up with palliative care  -Morphine 30 q12H and course of levaquin for pain and neutropenia given   -Will monitor with LFTs with labs but may need to hold chemo  -If labs and symptoms not improving may need to discontinue docetaxel and await clinical trial opening or go to hospice  -RTC in 1 week for MD labs and tx      Cancer related  pain/palliative care by specialist  -Pain well controlled and is not using pain meds since restarting tx and palliative XRT      DM2  -Controlled, following with PCP      Hypercalcemia/Mets to bone and spine  -Pt still has not gotten dental clearance and is aware of risk of jaw osteonecrosis but would like to start zometa today due to metastatic disease  -Continue calcium supplements while receiving zometa      PE  -on 5/31/23  -Most likely from his cancer  -Continue lifelong anticoagulation  -Eliquis refilled previous visit      Chemo related neutropenia  -secondary to cancer and docetaxel  -Prophylactic levaquin given  -Continue to monitor      Time spent with pt: 40 minutes      Problem List Items Addressed This Visit          Oncology    Small cell lung cancer - Primary (Chronic)    Overview     Initially diagnosed with extensive stage small cell lung cancer in 03/2022 who was treated with carbo/etop/atezolizumab from 04/05/2022 - 06/14/22 with partial response. He subsequently completed consolidation thoracic RT 8/4/22 - 8/18/22, and he was on maintenance atezolizumab.           Other Visit Diagnoses       Follow-up exam                        Soledad Irving MD  Hematology Oncology                                   Admission
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
## 2024-06-12 NOTE — ED PROVIDER NOTE - CLINICAL SUMMARY MEDICAL DECISION MAKING FREE TEXT BOX
Pt is a 35 YO M with PMH testicular cancer s/p orchiectomy, DM, HTN, HLD, BPH, GERD, Asthma, Autism, Intellectual disability, HAVEN, ADHD, mood disorder who presented to ED from his group home [facility] with right wrist and ankle pain. Pt reports he is unable to ambulate 2/2 pain and cannot return to his group home as they are unable to control his pain. Pt also has paperwork with script for CT right ankle w/ out contrast. Pt reports he is unable to get scan as outpatient. Discussed with SW for collateral however unable to contact pt group home at this time. Will obtain XR imaging and likely require admission for pain control, PT.

## 2024-06-12 NOTE — H&P ADULT - TIME BILLING
Reviewed lab data, radiology results, consultants' recommendations, documentation in [place], discussed with family, ACP, interdisciplinary staff and/or intervention were performed.

## 2024-06-12 NOTE — PATIENT PROFILE ADULT - FALL HARM RISK - RISK INTERVENTIONS
Assistance OOB with selected safe patient handling equipment/Assistance with ambulation/Communicate Fall Risk and Risk Factors to all staff, patient, and family/Discuss with provider need for PT consult/Monitor gait and stability/Reinforce activity limits and safety measures with patient and family/Reorient to person, place and time as needed/Review medications for side effects contributing to fall risk/Toileting schedule using arm’s reach rule for commode and bathroom/Use of alarms - bed, chair and/or voice tab/Visual Cue: Yellow wristband/Bed in lowest position, wheels locked, appropriate side rails in place/Call bell, personal items and telephone in reach/Instruct patient to call for assistance before getting out of bed or chair/Non-slip footwear when patient is out of bed/[place] to call system/Physically safe environment - no spills, clutter or unnecessary equipment/Purposeful Proactive Rounding/Room/bathroom lighting operational, light cord in reach

## 2024-06-12 NOTE — ED PROVIDER NOTE - PHYSICAL EXAMINATION
MSK: + tenderness to palpation right wrist, + mild swelling, limited ROM  + pain to palpation right ankle, decreased ROM  sensation and pulses intact, compartments soft   + ulcerations to LUE

## 2024-06-12 NOTE — H&P ADULT - NSHPLABSRESULTS_GEN_ALL_CORE
Personally reviewed labs, imaging, ekg                           12.7   5.84  )-----------( 211      ( 12 Jun 2024 01:33 )             39.3       06-12    139  |  101  |  11  ----------------------------<  121<H>  4.2   |  26  |  0.67    Ca    9.3      12 Jun 2024 01:33    TPro  6.3  /  Alb  3.6  /  TBili  <0.2  /  DBili  x   /  AST  20  /  ALT  34  /  AlkPhos  95  06-12              Urinalysis Basic - ( 12 Jun 2024 01:33 )    Color: x / Appearance: x / SG: x / pH: x  Gluc: 121 mg/dL / Ketone: x  / Bili: x / Urobili: x   Blood: x / Protein: x / Nitrite: x   Leuk Esterase: x / RBC: x / WBC x   Sq Epi: x / Non Sq Epi: x / Bacteria: x            Lactate Trend            CAPILLARY BLOOD GLUCOSE      POCT Blood Glucose.: 159 mg/dL (11 Jun 2024 20:37)            EKG:

## 2024-06-12 NOTE — H&P ADULT - PROBLEM SELECTOR PLAN 8
Diet: CC  DVT: lovenox  Dispo: pending PT eval Diet: CC  DVT: lovenox  Dispo: pending PT umairal  Attempted to call [phone] for further collateral without answer. Will attempt again

## 2024-06-12 NOTE — H&P ADULT - NSHPREVIEWOFSYSTEMS_GEN_ALL_CORE
REVIEW OF SYSTEMS:    CONSTITUTIONAL: No weakness, fevers or chills  EYES: No visual changes; no sclera icterics, no pain or drainage  ENT:  No vertigo or throat pain   NECK: No pain or stiffness  RESPIRATORY: No cough, wheezing, hemoptysis; No shortness of breath  CARDIOVASCULAR: No chest pain or palpitations  GASTROINTESTINAL: No abdominal or epigastric pain. No nausea, vomiting, or hematemesis; No diarrhea or constipation. No melena or hematochezia.  GENITOURINARY: No dysuria, frequency or hematuria  MSK: R ankle and r wrist pain.  NEUROLOGICAL: No numbness or weakness. No headache  SKIN: No itching, rashes  Psych: No anxiety or depression

## 2024-06-12 NOTE — PROVIDER CONTACT NOTE (OTHER) - SITUATION
patient family and beside aide reporting that patient is elopement risk and attempts to self harm at times
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
## 2024-06-12 NOTE — H&P ADULT - ASSESSMENT
34 year old M presents with R ankle and R wrist pain after fall and admitted for inability to ambulate.

## 2024-06-12 NOTE — H&P ADULT - PROBLEM SELECTOR PLAN 1
s/p fall prior to admission, without trauma to head  -Xray of R wrist and R ankle negative for fractures, does show ligament sprain  -Tylenol prn for pain  -PT consulted   -fall precautions s/p fall prior to admission, without trauma to head  -Xray of R wrist and R ankle negative for fractures, does show ligament sprain  -Tylenol prn for pain  -PT consulted   -fall precautions  -Pending CT R wrist and CT R ankle.

## 2024-06-12 NOTE — ED PROVIDER NOTE - PROGRESS NOTE DETAILS
SHAMAR Orozco: pt xray imaging with out acute fractures  CT scan ordered  pt reports still unable to bear weight  discussed with hospitalist for admission  pt has staff member at bedside, staff member reports there will be staff members present with pt during admission

## 2024-06-13 ENCOUNTER — TRANSCRIPTION ENCOUNTER (OUTPATIENT)
Age: 34
End: 2024-06-13

## 2024-06-13 VITALS
RESPIRATION RATE: 17 BRPM | HEART RATE: 82 BPM | TEMPERATURE: 98 F | DIASTOLIC BLOOD PRESSURE: 86 MMHG | OXYGEN SATURATION: 99 % | SYSTOLIC BLOOD PRESSURE: 122 MMHG

## 2024-06-13 LAB
A1C WITH ESTIMATED AVERAGE GLUCOSE RESULT: 6.8 % — HIGH (ref 4–5.6)
ESTIMATED AVERAGE GLUCOSE: 148 — SIGNIFICANT CHANGE UP
GLUCOSE BLDC GLUCOMTR-MCNC: 112 MG/DL — HIGH (ref 70–99)
MAGNESIUM SERPL-MCNC: 1.8 MG/DL — SIGNIFICANT CHANGE UP (ref 1.6–2.6)
PHOSPHATE SERPL-MCNC: 3.3 MG/DL — SIGNIFICANT CHANGE UP (ref 2.5–4.5)

## 2024-06-13 PROCEDURE — 99239 HOSP IP/OBS DSCHRG MGMT >30: CPT

## 2024-06-13 RX ADMIN — Medication 0.2 MILLIGRAM(S): at 05:56

## 2024-06-13 RX ADMIN — LORATADINE 10 MILLIGRAM(S): 10 TABLET ORAL at 14:10

## 2024-06-13 RX ADMIN — PANTOPRAZOLE SODIUM 40 MILLIGRAM(S): 20 TABLET, DELAYED RELEASE ORAL at 06:59

## 2024-06-13 RX ADMIN — RISPERIDONE 4 MILLIGRAM(S): 4 TABLET ORAL at 05:51

## 2024-06-13 RX ADMIN — Medication 0.2 MILLIGRAM(S): at 14:12

## 2024-06-13 RX ADMIN — Medication 50 MICROGRAM(S): at 05:38

## 2024-06-13 RX ADMIN — DIVALPROEX SODIUM 1000 MILLIGRAM(S): 500 TABLET, DELAYED RELEASE ORAL at 14:10

## 2024-06-13 RX ADMIN — ENOXAPARIN SODIUM 40 MILLIGRAM(S): 100 INJECTION SUBCUTANEOUS at 05:45

## 2024-06-13 NOTE — PROGRESS NOTE ADULT - PROBLEM SELECTOR PLAN 1
s/p fall prior to admission, without trauma to head  -Xray of R wrist and R ankle negative for fractures, does show ligament sprain  -Tylenol prn for pain  -PT consulted   -fall precautions  -Pending CT R wrist and CT R ankle. s/p fall prior to admission, without trauma to head  -Xray of R wrist and R ankle negative for fractures, does show ligament sprain  -CT R ankle with no acute fractures, chronic nonunion fracture of calcaneous  -Tylenol prn for pain  -PT consulted, states no PT needs  -fall precautions

## 2024-06-13 NOTE — PHYSICAL THERAPY INITIAL EVALUATION ADULT - ADDITIONAL COMMENTS
Pt states he lives in a group home on the 4th floor with elevator access and no steps to negotiate. Prior to admission pt reports being independent in ADLs and ambulation without device.     Following evaluation, pt was left sitting in chair in no distress, PCA present.

## 2024-06-13 NOTE — PROGRESS NOTE ADULT - SUBJECTIVE AND OBJECTIVE BOX
PROGRESS NOTE:     Patient is a 34y old  Male who presents with a chief complaint of ankle pain with inability to ambulate (12 Jun 2024 08:09)      SUBJECTIVE / OVERNIGHT EVENTS:  No acute events overnight. Patient examined at bedside with no acute complaints.       MEDICATIONS  (STANDING):  atorvastatin 10 milliGRAM(s) Oral at bedtime  busPIRone 15 milliGRAM(s) Oral <User Schedule>  chlorhexidine 2% Cloths 1 Application(s) Topical daily  cloNIDine 0.2 milliGRAM(s) Oral three times a day  dextrose 10% Bolus 125 milliLiter(s) IV Bolus once  dextrose 5%. 1000 milliLiter(s) (100 mL/Hr) IV Continuous <Continuous>  dextrose 5%. 1000 milliLiter(s) (50 mL/Hr) IV Continuous <Continuous>  dextrose 50% Injectable 25 Gram(s) IV Push once  dextrose 50% Injectable 12.5 Gram(s) IV Push once  divalproex  milliGRAM(s) Oral daily  divalproex ER 1000 milliGRAM(s) Oral daily  enoxaparin Injectable 40 milliGRAM(s) SubCutaneous every 12 hours  glucagon  Injectable 1 milliGRAM(s) IntraMuscular once  insulin lispro (ADMELOG) corrective regimen sliding scale   SubCutaneous three times a day before meals  insulin lispro (ADMELOG) corrective regimen sliding scale   SubCutaneous at bedtime  levothyroxine 50 MICROGram(s) Oral daily  loratadine 10 milliGRAM(s) Oral daily  mirtazapine 22.5 milliGRAM(s) Oral at bedtime  pantoprazole    Tablet 40 milliGRAM(s) Oral before breakfast  risperiDONE   Tablet 4 milliGRAM(s) Oral every 12 hours  tamsulosin 0.4 milliGRAM(s) Oral at bedtime    MEDICATIONS  (PRN):  acetaminophen     Tablet .. 650 milliGRAM(s) Oral every 6 hours PRN Mild Pain (1 - 3), Moderate Pain (4 - 6)  dextrose Oral Gel 15 Gram(s) Oral once PRN Blood Glucose LESS THAN 70 milliGRAM(s)/deciliter      CAPILLARY BLOOD GLUCOSE      POCT Blood Glucose.: 152 mg/dL (12 Jun 2024 17:48)  POCT Blood Glucose.: 147 mg/dL (12 Jun 2024 12:41)    I&O's Summary    12 Jun 2024 07:01  -  13 Jun 2024 06:34  --------------------------------------------------------  IN: 0 mL / OUT: 850 mL / NET: -850 mL        VITALS:   T(C): 36.5 (06-12-24 @ 13:05), Max: 36.5 (06-12-24 @ 13:05)  HR: 74 (06-12-24 @ 13:05) (74 - 76)  BP: 118/71 (06-12-24 @ 13:05) (108/77 - 118/71)  RR: 17 (06-12-24 @ 13:05) (17 - 19)  SpO2: 98% (06-12-24 @ 13:05) (98% - 98%)    GENERAL: NAD, lying in bed comfortably  HEAD:  Atraumatic, normocephalic  EYES: EOMI, PERRLA, conjunctiva and sclera clear  ENT: Moist mucous membranes  NECK: Supple, no JVD  HEART: Regular rate and rhythm, no murmurs, rubs, or gallops  LUNGS: Unlabored respirations.  Clear to auscultation bilaterally, no crackles, wheezing, or rhonchi  ABDOMEN: Soft, nontender, nondistended, +BS  EXTREMITIES: 2+ peripheral pulses bilaterally. No clubbing, cyanosis, or edema  NERVOUS SYSTEM:  A&Ox3, no focal deficits   SKIN: No rashes or lesions    LABS:                        12.1   5.36  )-----------( 226      ( 12 Jun 2024 19:24 )             37.7     06-12    138  |  103  |  10  ----------------------------<  133<H>  4.5   |  26  |  0.66    Ca    9.1      12 Jun 2024 19:15    TPro  6.2  /  Alb  3.6  /  TBili  <0.2  /  DBili  x   /  AST  40  /  ALT  52<H>  /  AlkPhos  99  06-12          Urinalysis Basic - ( 12 Jun 2024 19:15 )    Color: x / Appearance: x / SG: x / pH: x  Gluc: 133 mg/dL / Ketone: x  / Bili: x / Urobili: x   Blood: x / Protein: x / Nitrite: x   Leuk Esterase: x / RBC: x / WBC x   Sq Epi: x / Non Sq Epi: x / Bacteria: x         PROGRESS NOTE:     Patient is a 34y old  Male who presents with a chief complaint of ankle pain with inability to ambulate (12 Jun 2024 08:09)      SUBJECTIVE / OVERNIGHT EVENTS:  No acute events overnight. Patient examined at bedside. States he has pain in R wrist and ankle, states he is unsure of duration of pain or any inciting factors (e.g. injuries).      MEDICATIONS  (STANDING):  atorvastatin 10 milliGRAM(s) Oral at bedtime  busPIRone 15 milliGRAM(s) Oral <User Schedule>  chlorhexidine 2% Cloths 1 Application(s) Topical daily  cloNIDine 0.2 milliGRAM(s) Oral three times a day  dextrose 10% Bolus 125 milliLiter(s) IV Bolus once  dextrose 5%. 1000 milliLiter(s) (100 mL/Hr) IV Continuous <Continuous>  dextrose 5%. 1000 milliLiter(s) (50 mL/Hr) IV Continuous <Continuous>  dextrose 50% Injectable 25 Gram(s) IV Push once  dextrose 50% Injectable 12.5 Gram(s) IV Push once  divalproex  milliGRAM(s) Oral daily  divalproex ER 1000 milliGRAM(s) Oral daily  enoxaparin Injectable 40 milliGRAM(s) SubCutaneous every 12 hours  glucagon  Injectable 1 milliGRAM(s) IntraMuscular once  insulin lispro (ADMELOG) corrective regimen sliding scale   SubCutaneous three times a day before meals  insulin lispro (ADMELOG) corrective regimen sliding scale   SubCutaneous at bedtime  levothyroxine 50 MICROGram(s) Oral daily  loratadine 10 milliGRAM(s) Oral daily  mirtazapine 22.5 milliGRAM(s) Oral at bedtime  pantoprazole    Tablet 40 milliGRAM(s) Oral before breakfast  risperiDONE   Tablet 4 milliGRAM(s) Oral every 12 hours  tamsulosin 0.4 milliGRAM(s) Oral at bedtime    MEDICATIONS  (PRN):  acetaminophen     Tablet .. 650 milliGRAM(s) Oral every 6 hours PRN Mild Pain (1 - 3), Moderate Pain (4 - 6)  dextrose Oral Gel 15 Gram(s) Oral once PRN Blood Glucose LESS THAN 70 milliGRAM(s)/deciliter      CAPILLARY BLOOD GLUCOSE      POCT Blood Glucose.: 152 mg/dL (12 Jun 2024 17:48)  POCT Blood Glucose.: 147 mg/dL (12 Jun 2024 12:41)    I&O's Summary    12 Jun 2024 07:01  -  13 Jun 2024 06:34  --------------------------------------------------------  IN: 0 mL / OUT: 850 mL / NET: -850 mL        VITALS:   T(C): 36.5 (06-12-24 @ 13:05), Max: 36.5 (06-12-24 @ 13:05)  HR: 74 (06-12-24 @ 13:05) (74 - 76)  BP: 118/71 (06-12-24 @ 13:05) (108/77 - 118/71)  RR: 17 (06-12-24 @ 13:05) (17 - 19)  SpO2: 98% (06-12-24 @ 13:05) (98% - 98%)    GENERAL: NAD, lying in bed comfortably  HEAD: Atraumatic, normocephalic  EYES: EOMI, conjunctiva and sclera clear  HEART: Regular rate and rhythm, no murmurs, rubs, or gallops  LUNGS: Unlabored respirations. Difficult to auscultate posteriorly given patient habitus, but appears clear anteriorly at apices.  ABDOMEN: Soft, nontender, nondistended, +BS  EXTREMITIES: TTP R wrist, R ankle  NERVOUS SYSTEM: A&Ox3    LABS:                        12.1   5.36  )-----------( 226      ( 12 Jun 2024 19:24 )             37.7     06-12    138  |  103  |  10  ----------------------------<  133<H>  4.5   |  26  |  0.66    Ca    9.1      12 Jun 2024 19:15    TPro  6.2  /  Alb  3.6  /  TBili  <0.2  /  DBili  x   /  AST  40  /  ALT  52<H>  /  AlkPhos  99  06-12          Urinalysis Basic - ( 12 Jun 2024 19:15 )    Color: x / Appearance: x / SG: x / pH: x  Gluc: 133 mg/dL / Ketone: x  / Bili: x / Urobili: x   Blood: x / Protein: x / Nitrite: x   Leuk Esterase: x / RBC: x / WBC x   Sq Epi: x / Non Sq Epi: x / Bacteria: x

## 2024-06-13 NOTE — PROVIDER CONTACT NOTE (OTHER) - SITUATION
the patient's aide from [facility] came up to desk and said she was leaving and no one was coming to relieve her. Director of [facility] said patient needs to be on 1:1 for elopement risk.

## 2024-06-13 NOTE — PHYSICAL THERAPY INITIAL EVALUATION ADULT - PERTINENT HX OF CURRENT PROBLEM, REHAB EVAL
34 year old male presents with right ankle and right wrist pain after fall and admitted for inability to ambulate. X-ray right ankle, foot, wrist, hand - No acute fracture or dislocation.

## 2024-06-13 NOTE — PROGRESS NOTE ADULT - TIME BILLING
Reviewed lab data, radiology results, consultants' recommendations, documentation in [place], discussed with family, ACP, interdisciplinary staff and/or intervention were performed.

## 2024-06-13 NOTE — DISCHARGE NOTE PROVIDER - NSDCMRMEDTOKEN_GEN_ALL_CORE_FT
acetaminophen 325 mg oral tablet: 2 tab(s) orally every 6 hours As needed Temp greater or equal to 38C (100.4F), Mild Pain (1 - 3)  atorvastatin 10 mg oral tablet: 1 tab(s) orally once a day (in the evening)  busPIRone 15 mg oral tablet: 1 tab(s) orally once a day (in the evening)  cloNIDine 0.2 mg oral tablet: 1 tab(s) orally 3 times a day  divalproex sodium 500 mg oral tablet, extended release: 1 tab(s) orally 2 times a day 1 at 12PM and 2 tabs at 7PM  docusate sodium 100 mg oral tablet: 1 tab(s) orally 3 times a day  levothyroxine 50 mcg (0.05 mg) oral tablet: 1 tab(s) orally once a day  loratadine 10 mg oral tablet: 1 tab(s) orally once a day  metFORMIN 1000 mg oral tablet: 1 tab(s) orally 2 times a day (with meals)  mirtazapine 15 mg oral tablet: 1.5 tab(s) orally once a day (at bedtime)  pantoprazole 20 mg oral delayed release tablet: 1 tab(s) orally once a day  risperiDONE 4 mg oral tablet: 1 tab(s) orally 2 times a day at 7 AM and 7 PM  tamsulosin 0.4 mg oral capsule: 1 cap(s) orally once a day (at bedtime)

## 2024-06-13 NOTE — DISCHARGE NOTE PROVIDER - NSFOLLOWUPCLINICS_GEN_ALL_ED_FT
[facility] General Internal Medicine  General Internal Medicine  [address] [place] [address]  Phone: [phone]  Fax:

## 2024-06-13 NOTE — DISCHARGE NOTE PROVIDER - NSDCCPTREATMENT_GEN_ALL_CORE_FT
PRINCIPAL PROCEDURE  Procedure: CT ankle RT  Findings and Treatment: IMPRESSION:  1.  No acute fracture or dislocation.  2.  Soft tissue swelling over the lateral malleolus suggesting possible underlying ligamentous sprain.  3.  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      SECONDARY PROCEDURE  Procedure: XR ankle right, 2 views  Findings and Treatment: IMPRESSION:  No acute fracture or dislocation.    Procedure: XR hand right limited  Findings and Treatment: IMPRESSION: Limited study  No acute fracture or dislocation. If pain persists, repeat radiographs with scaphoid view or CT can be performed.
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
## 2024-06-13 NOTE — PHYSICAL THERAPY INITIAL EVALUATION ADULT - ASR WT BEARING STATUS EVAL
maintained non weight bearing of right UE as pt pending CT of right hand and wrist to rule out fracture./no weight-bearing restrictions

## 2024-06-13 NOTE — DISCHARGE NOTE PROVIDER - NSDCCPCAREPLAN_GEN_ALL_CORE_FT
PRINCIPAL DISCHARGE DIAGNOSIS  Diagnosis: Right ankle pain  Assessment and Plan of Treatment: You were seen in the hospital for right ankle and wrist pain, and associated inability walking. Your XRs were negative for fractures or dislocation in the ankle and wrist. CT of your ankle demonstrated a chronic nonuion fracture of the heel, which does not require acute surgical intervention. Please follow up with your primary care doctor and orthopedics if your pain worsens.

## 2024-06-13 NOTE — DISCHARGE NOTE NURSING/CASE MANAGEMENT/SOCIAL WORK - NSDCVIVACCINE_GEN_ALL_CORE_FT
Tdap; 20-Dec-2018 12:21; Lo Anaya (RN); [employer]; [id number] (Exp. Date: 02-Nov-2020); IntraMuscular; Deltoid Left.; 0.5 milliLiter(s); VIS (VIS Published: 09-May-2013, VIS Presented: 20-Dec-2018);   Tdap; 26-Mar-2019 18:59; Shavon Barrios (RN); [employer]; [id number] (Exp. Date: 26-Feb-2021); IntraMuscular; Deltoid Left.; 0.5 milliLiter(s); VIS (VIS Published: 09-May-2013, VIS Presented: 26-Mar-2019);   Tdap; 01-Dec-2021 09:35; Zamzam Coulter (RN); [employer]; [id number] (Exp. Date: 09-Sep-2023); IntraMuscular; Deltoid Left.; 0.5 milliLiter(s); VIS (VIS Published: 09-May-2013, VIS Presented: 01-Dec-2021);   Tdap; 21-Jul-2022 01:28; Rose Hancock (RN); [employer]; [id number] (Exp. Date: 14-Oct-2024); IntraMuscular; Deltoid Right.; 0.5 milliLiter(s); VIS (VIS Published: 09-May-2013, VIS Presented: 21-Jul-2022);   Tdap; 12-Jun-2023 21:01; Diann Paul (RN); [employer]; [id number] (Exp. Date: 08-Mar-2025); IntraMuscular; Deltoid Left.; 0.5 milliLiter(s); VIS (VIS Published: 09-May-2013, VIS Presented: 12-Jun-2023);   Tdap; 24-Jul-2023 23:01; Diann Paul (RN); [employer]; [id number] (Exp. Date: 18-Aug-2025); IntraMuscular; Deltoid Right.; 0.5 milliLiter(s); VIS (VIS Published: 09-May-2013, VIS Presented: 24-Jul-2023);   Tdap; 09-Jun-2024 22:20; Dyllan Arevalo (RN); [employer]; [id number] (Exp. Date: 21-Nov-2024); IntraMuscular; Deltoid Right.; 0.5 milliLiter(s); VIS (VIS Published: 09-May-2013, VIS Presented: 09-Jun-2024);

## 2024-06-13 NOTE — PROVIDER CONTACT NOTE (OTHER) - ASSESSMENT
patient calm and stable
Patient remains calm in bed, cooperative, and is not asking to leave. No attempts at self harm have been made. according to MD patient denies SI and HI and wants to stay and go to rehab. Bed alarm is on.

## 2024-06-13 NOTE — DISCHARGE NOTE NURSING/CASE MANAGEMENT/SOCIAL WORK - PATIENT PORTAL LINK FT
You can access the FollowMyHealth Patient Portal offered by [facility] by registering at the following website: [email or web address]. By joining [employer]’s FollowMyHealth portal, you will also be able to view your health information using other applications (apps) compatible with our system.

## 2024-06-13 NOTE — DISCHARGE NOTE NURSING/CASE MANAGEMENT/SOCIAL WORK - NSDCPNINST_GEN_ALL_CORE
Patient A&O3, no c/o pain, pt discharged to group home this afternoon to be accompanied by staff from group [facility], no distress noted.
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
## 2024-06-13 NOTE — PHYSICAL THERAPY INITIAL EVALUATION ADULT - FOLLOWS COMMANDS/ANSWERS QUESTIONS, REHAB EVAL
Provider Comments  Provider Comments:   Pt was a no-show for today's 3pm apt  L/M to call us and r/s        Signatures   Electronically signed by : Roderick Hernandez, ; May  2 2017  3:30PM EST                       (Administrative) 100% of the time

## 2024-06-13 NOTE — PHYSICAL THERAPY INITIAL EVALUATION ADULT - WEIGHT-BEARING RESTRICTIONS: STAND/SIT, REHAB EVAL
maintained non weight bearing of right UE as pt pending CT of right hand and wrist to rule out fracture.

## 2024-06-13 NOTE — PROGRESS NOTE ADULT - PROBLEM SELECTOR PLAN 8
Diet: CC  DVT: lovenox  Dispo: pending PT umairal  Attempted to call [phone] for further collateral without answer. Will attempt again Diet: CC  DVT: lovenox  Dispo: back to group home

## 2024-06-13 NOTE — PROVIDER CONTACT NOTE (OTHER) - REASON
patient to be a 1:1 for elopement
patient family and beside aide reporting that patient is elopement risk

## 2024-06-16 ENCOUNTER — EMERGENCY (EMERGENCY)
Facility: HOSPITAL | Age: 34
LOS: 1 days | Discharge: HOME | End: 2024-06-16
Attending: EMERGENCY MEDICINE
Payer: MEDICAID

## 2024-06-16 VITALS
HEIGHT: 71 IN | OXYGEN SATURATION: 94 % | RESPIRATION RATE: 18 BRPM | DIASTOLIC BLOOD PRESSURE: 78 MMHG | WEIGHT: 300.05 LBS | TEMPERATURE: 98 F | HEART RATE: 94 BPM | SYSTOLIC BLOOD PRESSURE: 107 MMHG

## 2024-06-16 VITALS
SYSTOLIC BLOOD PRESSURE: 118 MMHG | RESPIRATION RATE: 18 BRPM | DIASTOLIC BLOOD PRESSURE: 80 MMHG | HEART RATE: 85 BPM | TEMPERATURE: 99 F | OXYGEN SATURATION: 96 %

## 2024-06-16 DIAGNOSIS — Z90.79 ACQUIRED ABSENCE OF OTHER GENITAL ORGAN(S): Chronic | ICD-10-CM

## 2024-06-16 LAB
ANION GAP SERPL CALC-SCNC: 5 MMOL/L — SIGNIFICANT CHANGE UP (ref 5–17)
BUN SERPL-MCNC: 16 MG/DL — SIGNIFICANT CHANGE UP (ref 7–18)
CALCIUM SERPL-MCNC: 9.2 MG/DL — SIGNIFICANT CHANGE UP (ref 8.4–10.5)
CHLORIDE SERPL-SCNC: 105 MMOL/L — SIGNIFICANT CHANGE UP (ref 96–108)
CO2 SERPL-SCNC: 28 MMOL/L — SIGNIFICANT CHANGE UP (ref 22–31)
CREAT SERPL-MCNC: 0.76 MG/DL — SIGNIFICANT CHANGE UP (ref 0.5–1.3)
EGFR: 120 ML/MIN/1.73M2 — SIGNIFICANT CHANGE UP
FLUAV AG NPH QL: SIGNIFICANT CHANGE UP
FLUBV AG NPH QL: SIGNIFICANT CHANGE UP
GLUCOSE SERPL-MCNC: 112 MG/DL — HIGH (ref 70–99)
HCT VFR BLD CALC: 39.5 % — SIGNIFICANT CHANGE UP (ref 39–50)
HGB BLD-MCNC: 12.7 G/DL — LOW (ref 13–17)
MCHC RBC-ENTMCNC: 25.2 PG — LOW (ref 27–34)
MCHC RBC-ENTMCNC: 32.2 GM/DL — SIGNIFICANT CHANGE UP (ref 32–36)
MCV RBC AUTO: 78.4 FL — LOW (ref 80–100)
NRBC # BLD: 0 /100 WBCS — SIGNIFICANT CHANGE UP (ref 0–0)
PLATELET # BLD AUTO: 222 K/UL — SIGNIFICANT CHANGE UP (ref 150–400)
POTASSIUM SERPL-MCNC: 4.5 MMOL/L — SIGNIFICANT CHANGE UP (ref 3.5–5.3)
POTASSIUM SERPL-SCNC: 4.5 MMOL/L — SIGNIFICANT CHANGE UP (ref 3.5–5.3)
RBC # BLD: 5.04 M/UL — SIGNIFICANT CHANGE UP (ref 4.2–5.8)
RBC # FLD: 14.4 % — SIGNIFICANT CHANGE UP (ref 10.3–14.5)
SARS-COV-2 RNA SPEC QL NAA+PROBE: SIGNIFICANT CHANGE UP
SODIUM SERPL-SCNC: 138 MMOL/L — SIGNIFICANT CHANGE UP (ref 135–145)
WBC # BLD: 8.5 K/UL — SIGNIFICANT CHANGE UP (ref 3.8–10.5)
WBC # FLD AUTO: 8.5 K/UL — SIGNIFICANT CHANGE UP (ref 3.8–10.5)

## 2024-06-16 PROCEDURE — 80048 BASIC METABOLIC PNL TOTAL CA: CPT

## 2024-06-16 PROCEDURE — 99283 EMERGENCY DEPT VISIT LOW MDM: CPT | Mod: 25

## 2024-06-16 PROCEDURE — 99284 EMERGENCY DEPT VISIT MOD MDM: CPT

## 2024-06-16 PROCEDURE — 71045 X-RAY EXAM CHEST 1 VIEW: CPT

## 2024-06-16 PROCEDURE — 71045 X-RAY EXAM CHEST 1 VIEW: CPT | Mod: 26

## 2024-06-16 PROCEDURE — 36415 COLL VENOUS BLD VENIPUNCTURE: CPT

## 2024-06-16 PROCEDURE — 85027 COMPLETE CBC AUTOMATED: CPT

## 2024-06-16 PROCEDURE — 87637 SARSCOV2&INF A&B&RSV AMP PRB: CPT

## 2024-06-16 RX ORDER — ACETAMINOPHEN 500 MG
975 TABLET ORAL ONCE
Refills: 0 | Status: COMPLETED | OUTPATIENT
Start: 2024-06-16 | End: 2024-06-16

## 2024-06-16 RX ORDER — SODIUM CHLORIDE 9 MG/ML
1000 INJECTION INTRAMUSCULAR; INTRAVENOUS; SUBCUTANEOUS ONCE
Refills: 0 | Status: COMPLETED | OUTPATIENT
Start: 2024-06-16 | End: 2024-06-16

## 2024-06-16 RX ADMIN — SODIUM CHLORIDE 1000 MILLILITER(S): 9 INJECTION INTRAMUSCULAR; INTRAVENOUS; SUBCUTANEOUS at 20:26

## 2024-06-16 RX ADMIN — Medication 975 MILLIGRAM(S): at 19:56

## 2024-06-16 NOTE — ED ADULT TRIAGE NOTE - HEIGHT IN FEET
Pt admitted and oriented to 306 via w/c from LD. Pt allergies and birthdate verified; bedside information reviewed w/ pt; pt ref offered flu and tdap vaccines; VIS given; pt denies any complaints or needs at present; states she just wants to rest; inst to call for assist OOB/ to BR; call light in reach. 5

## 2024-06-16 NOTE — ED PROVIDER NOTE - OBJECTIVE STATEMENT
33 yo m hx of developmental delay, asthma, GERD, hyperthyroidism, here for flu-like symptoms. pt reports he was diagnosed w/ influenza 1 week ago but still doesn't feel better. C/o bodyaches, dizziness, malaise, generalized weakness. No n/v/d, CP, dyspnea or any other complaints.

## 2024-06-16 NOTE — ED PROVIDER NOTE - PATIENT PORTAL LINK FT
You can access the FollowMyHealth Patient Portal offered by [facility] by registering at the following website: [email or web address]. By joining [employer]’s FollowMyHealth portal, you will also be able to view your health information using other applications (apps) compatible with our system.

## 2024-06-16 NOTE — ED PROVIDER NOTE - PHYSICAL EXAMINATION
General: pt lying in stretcher, appears stated age and is not in distress  HEENT: AT/NC, pink conjunctiva, anicteric sclerae, EOMI, PERRLA, TMs smooth, grey, intact, with normal landmarks, nasal mucosa pink, no discharge, turbinates not enlarged; moist mucus membranes, tongue well-papillated, good dentition; posterior pharynx shows no erythema or exudates;   Neck: supple, full ROM, trachea midline, no JVD, no cervical LAD, no midline ttp or stepoffs  Lungs: symmetric excursion, b/l clear vesicular breath sounds with no wheezes, crackles, or rhonchi  Heart: rrr, S1, S2 normal; no S3 or S4; no murmurs or rubs  Abd: normal bowel sounds; soft, nontender; negative McBurney's point tenderness, negative Childs's sign, no rebound, no guarding, spleen non-palpable; no hepatomegaly, no masses  Back: no midline spinal tenderness or stepoffs, no costovertebral angle tenderness  Extremities: no clubbing, cyanosis, or edema; no palpable deformities or fractures  Skin: good turgor; no rashes, petechiae, ecchymoses, or jaundice  Pulses: radial, posterior tibialis (PT), dorsalis pedis (DP) all 2+ & symmetric  Neuro: awake, alert, responsive; oriented to person, place and time; cranial nerves intact, EOMI, intact jaw movement, intact facial sensation, no facial asymmetry, hearing intact; no nystagmus, tongue midline; Motor: Normal tone in upper and lower extremities bilaterally strength 5/5; Sensory: intact to pinprick and light touch; Cerebellar: finger-to-nose intact; normal steady gait; negative Romberg’s sign; DTR: biceps, triceps, patellar, 2+, no pronator drift

## 2024-06-16 NOTE — ED PROVIDER NOTE - CARE PROVIDER_API CALL
Gus Birmingham [address], [facility] [address]  [place] [address]  Phone: [phone]  Fax: [phone]  Follow Up Time: 1-3 Days
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
## 2024-06-16 NOTE — ED ADULT NURSE REASSESSMENT NOTE - NSFALLHARMRISKINTERV_ED_ALL_ED
Assistance OOB with selected safe patient handling equipment if applicable/Communicate risk of Fall with Harm to all staff, patient, and family/Provide visual cue: red socks, yellow wristband, yellow gown, etc/Reinforce activity limits and safety measures with patient and family/Bed in lowest position, wheels locked, appropriate side rails in place/Call bell, personal items and telephone in reach/Instruct patient to call for assistance before getting out of bed/chair/stretcher/Non-slip footwear applied when patient is off stretcher/[place] to call system/Physically safe environment - no spills, clutter or unnecessary equipment/Purposeful Proactive Rounding/Room/bathroom lighting operational, light cord in reach

## 2024-06-16 NOTE — ED ADULT TRIAGE NOTE - BIRTH SEX
Problem: Adult Inpatient Plan of Care  Goal: Plan of Care Review  Outcome: Progressing  Goal: Patient-Specific Goal (Individualized)  Outcome: Progressing  Goal: Absence of Hospital-Acquired Illness or Injury  Outcome: Progressing  Goal: Optimal Comfort and Wellbeing  Outcome: Progressing  Goal: Readiness for Transition of Care  Outcome: Progressing     Problem: Infection  Goal: Absence of Infection Signs and Symptoms  Outcome: Progressing     Problem: Hemodialysis  Goal: Safe, Effective Therapy Delivery  Outcome: Progressing  Goal: Effective Tissue Perfusion  Outcome: Progressing  Goal: Absence of Infection Signs and Symptoms  Outcome: Progressing      Male

## 2024-06-17 NOTE — ED ADULT TRIAGE NOTE - SOURCE OF INFORMATION
EMS Well appearing, awake, alert, oriented to person, place, time/situation and in no apparent distress. normal...

## 2024-06-17 NOTE — ED ADULT NURSE NOTE - NSFALLUNIVINTERV_ED_ALL_ED
Bed/Stretcher in lowest position, wheels locked, appropriate side rails in place/Call bell, personal items and telephone in reach/Instruct patient to call for assistance before getting out of bed/chair/stretcher/Non-slip footwear applied when patient is off stretcher/[place] to call system/Physically safe environment - no spills, clutter or unnecessary equipment/Purposeful proactive rounding/Room/bathroom lighting operational, light cord in reach

## 2024-06-19 ENCOUNTER — NON-APPOINTMENT (OUTPATIENT)
Age: 34
End: 2024-06-19

## 2024-06-22 ENCOUNTER — EMERGENCY (EMERGENCY)
Facility: HOSPITAL | Age: 34
LOS: 1 days | Discharge: HOME | End: 2024-06-22
Attending: EMERGENCY MEDICINE
Payer: MEDICAID

## 2024-06-22 VITALS
HEART RATE: 92 BPM | OXYGEN SATURATION: 96 % | DIASTOLIC BLOOD PRESSURE: 73 MMHG | RESPIRATION RATE: 16 BRPM | SYSTOLIC BLOOD PRESSURE: 107 MMHG | HEIGHT: 71 IN | TEMPERATURE: 99 F

## 2024-06-22 VITALS
DIASTOLIC BLOOD PRESSURE: 77 MMHG | SYSTOLIC BLOOD PRESSURE: 116 MMHG | HEART RATE: 68 BPM | TEMPERATURE: 97 F | RESPIRATION RATE: 17 BRPM | OXYGEN SATURATION: 95 %

## 2024-06-22 DIAGNOSIS — Z90.79 ACQUIRED ABSENCE OF OTHER GENITAL ORGAN(S): Chronic | ICD-10-CM

## 2024-06-22 LAB
ACETONE SERPL-MCNC: NEGATIVE — SIGNIFICANT CHANGE UP
ALBUMIN SERPL ELPH-MCNC: 3.4 G/DL — LOW (ref 3.5–5)
ALP SERPL-CCNC: 101 U/L — SIGNIFICANT CHANGE UP (ref 40–120)
ALT FLD-CCNC: 51 U/L DA — SIGNIFICANT CHANGE UP (ref 10–60)
ANION GAP SERPL CALC-SCNC: 6 MMOL/L — SIGNIFICANT CHANGE UP (ref 5–17)
AST SERPL-CCNC: 21 U/L — SIGNIFICANT CHANGE UP (ref 10–40)
BASOPHILS # BLD AUTO: 0.01 K/UL — SIGNIFICANT CHANGE UP (ref 0–0.2)
BASOPHILS NFR BLD AUTO: 0.1 % — SIGNIFICANT CHANGE UP (ref 0–2)
BILIRUB SERPL-MCNC: 0.2 MG/DL — SIGNIFICANT CHANGE UP (ref 0.2–1.2)
BUN SERPL-MCNC: 16 MG/DL — SIGNIFICANT CHANGE UP (ref 7–18)
CALCIUM SERPL-MCNC: 8.9 MG/DL — SIGNIFICANT CHANGE UP (ref 8.4–10.5)
CHLORIDE SERPL-SCNC: 105 MMOL/L — SIGNIFICANT CHANGE UP (ref 96–108)
CO2 SERPL-SCNC: 24 MMOL/L — SIGNIFICANT CHANGE UP (ref 22–31)
CREAT SERPL-MCNC: 1.09 MG/DL — SIGNIFICANT CHANGE UP (ref 0.5–1.3)
EGFR: 91 ML/MIN/1.73M2 — SIGNIFICANT CHANGE UP
EOSINOPHIL # BLD AUTO: 0 K/UL — SIGNIFICANT CHANGE UP (ref 0–0.5)
EOSINOPHIL NFR BLD AUTO: 0 % — SIGNIFICANT CHANGE UP (ref 0–6)
GLUCOSE SERPL-MCNC: 335 MG/DL — HIGH (ref 70–99)
HCT VFR BLD CALC: 38.7 % — LOW (ref 39–50)
HGB BLD-MCNC: 12.4 G/DL — LOW (ref 13–17)
IMM GRANULOCYTES NFR BLD AUTO: 1.8 % — HIGH (ref 0–0.9)
LYMPHOCYTES # BLD AUTO: 1.2 K/UL — SIGNIFICANT CHANGE UP (ref 1–3.3)
LYMPHOCYTES # BLD AUTO: 12.1 % — LOW (ref 13–44)
MCHC RBC-ENTMCNC: 25.1 PG — LOW (ref 27–34)
MCHC RBC-ENTMCNC: 32 GM/DL — SIGNIFICANT CHANGE UP (ref 32–36)
MCV RBC AUTO: 78.2 FL — LOW (ref 80–100)
MONOCYTES # BLD AUTO: 0.81 K/UL — SIGNIFICANT CHANGE UP (ref 0–0.9)
MONOCYTES NFR BLD AUTO: 8.2 % — SIGNIFICANT CHANGE UP (ref 2–14)
NEUTROPHILS # BLD AUTO: 7.69 K/UL — HIGH (ref 1.8–7.4)
NEUTROPHILS NFR BLD AUTO: 77.8 % — HIGH (ref 43–77)
NRBC # BLD: 0 /100 WBCS — SIGNIFICANT CHANGE UP (ref 0–0)
PLATELET # BLD AUTO: 268 K/UL — SIGNIFICANT CHANGE UP (ref 150–400)
POTASSIUM SERPL-MCNC: 5.2 MMOL/L — SIGNIFICANT CHANGE UP (ref 3.5–5.3)
POTASSIUM SERPL-SCNC: 5.2 MMOL/L — SIGNIFICANT CHANGE UP (ref 3.5–5.3)
PROT SERPL-MCNC: 7.1 G/DL — SIGNIFICANT CHANGE UP (ref 6–8.3)
RBC # BLD: 4.95 M/UL — SIGNIFICANT CHANGE UP (ref 4.2–5.8)
RBC # FLD: 14.2 % — SIGNIFICANT CHANGE UP (ref 10.3–14.5)
SODIUM SERPL-SCNC: 135 MMOL/L — SIGNIFICANT CHANGE UP (ref 135–145)
WBC # BLD: 9.89 K/UL — SIGNIFICANT CHANGE UP (ref 3.8–10.5)
WBC # FLD AUTO: 9.89 K/UL — SIGNIFICANT CHANGE UP (ref 3.8–10.5)

## 2024-06-22 PROCEDURE — 85025 COMPLETE CBC W/AUTO DIFF WBC: CPT

## 2024-06-22 PROCEDURE — 96360 HYDRATION IV INFUSION INIT: CPT

## 2024-06-22 PROCEDURE — 71045 X-RAY EXAM CHEST 1 VIEW: CPT | Mod: 26

## 2024-06-22 PROCEDURE — 80053 COMPREHEN METABOLIC PANEL: CPT

## 2024-06-22 PROCEDURE — 93005 ELECTROCARDIOGRAM TRACING: CPT

## 2024-06-22 PROCEDURE — 82962 GLUCOSE BLOOD TEST: CPT

## 2024-06-22 PROCEDURE — 36415 COLL VENOUS BLD VENIPUNCTURE: CPT

## 2024-06-22 PROCEDURE — 99283 EMERGENCY DEPT VISIT LOW MDM: CPT | Mod: 25

## 2024-06-22 PROCEDURE — 99284 EMERGENCY DEPT VISIT MOD MDM: CPT

## 2024-06-22 PROCEDURE — 82009 KETONE BODYS QUAL: CPT

## 2024-06-22 PROCEDURE — 71045 X-RAY EXAM CHEST 1 VIEW: CPT

## 2024-06-22 RX ORDER — SODIUM CHLORIDE 9 MG/ML
1000 INJECTION INTRAMUSCULAR; INTRAVENOUS; SUBCUTANEOUS ONCE
Refills: 0 | Status: COMPLETED | OUTPATIENT
Start: 2024-06-22 | End: 2024-06-22

## 2024-06-22 RX ADMIN — SODIUM CHLORIDE 1000 MILLILITER(S): 9 INJECTION INTRAMUSCULAR; INTRAVENOUS; SUBCUTANEOUS at 20:25

## 2024-06-22 RX ADMIN — SODIUM CHLORIDE 1000 MILLILITER(S): 9 INJECTION INTRAMUSCULAR; INTRAVENOUS; SUBCUTANEOUS at 21:25

## 2024-06-22 NOTE — ED PROVIDER NOTE - PATIENT PORTAL LINK FT
You can access the FollowMyHealth Patient Portal offered by [facility] by registering at the following website: [email or web address]. By joining [email or web address]’s FollowMyHealth portal, you will also be able to view your health information using other applications (apps) compatible with our system.

## 2024-06-22 NOTE — ED ADULT NURSE NOTE - OBJECTIVE STATEMENT
Patient BIB EMS c/o chest pain w no radiation, SOB, nausea and vomiting x yesterday. Recently dx with RSV. Denies difficulty breathing at this time, no SOB noted. SpO2 98% on room air. Denies syncopal episodes, headache, dizziness, diarrhea, or abdominal pain.

## 2024-06-22 NOTE — ED PROVIDER NOTE - OBJECTIVE STATEMENT
Patient is a 34-year-old gentleman with developmental delay, asthma, hypothyroidism, non-insulin-dependent diabetes who presents to the ED because of elevated glucose.  Patient was recently diagnosed with RSV at outside hospital.  Patient has been having persistent cough, nonproductive.  Has been having some nausea.  Found his glucose was elevated and was told to come to the ED from his group home.  Denies any dysuria, or shortness of breath.

## 2024-06-22 NOTE — ED ADULT NURSE NOTE - NSFALLUNIVINTERV_ED_ALL_ED
Bed/Stretcher in lowest position, wheels locked, appropriate side rails in place/Call bell, personal items and telephone in reach/Instruct patient to call for assistance before getting out of bed/chair/stretcher/Non-slip footwear applied when patient is off stretcher/[place] to call system/Physically safe environment - no spills, clutter or unnecessary equipment/Purposeful proactive rounding/Room/bathroom lighting operational, light cord in reach

## 2024-06-26 ENCOUNTER — NON-APPOINTMENT (OUTPATIENT)
Age: 34
End: 2024-06-26

## 2024-07-02 ENCOUNTER — NON-APPOINTMENT (OUTPATIENT)
Age: 34
End: 2024-07-02

## 2024-07-05 ENCOUNTER — EMERGENCY (EMERGENCY)
Facility: HOSPITAL | Age: 34
LOS: 1 days | Discharge: HOME | End: 2024-07-05
Attending: EMERGENCY MEDICINE | Admitting: EMERGENCY MEDICINE
Payer: MEDICAID

## 2024-07-05 VITALS
RESPIRATION RATE: 18 BRPM | TEMPERATURE: 99 F | OXYGEN SATURATION: 97 % | DIASTOLIC BLOOD PRESSURE: 78 MMHG | HEART RATE: 93 BPM | HEIGHT: 71 IN | SYSTOLIC BLOOD PRESSURE: 168 MMHG

## 2024-07-05 DIAGNOSIS — Z90.79 ACQUIRED ABSENCE OF OTHER GENITAL ORGAN(S): Chronic | ICD-10-CM

## 2024-07-05 PROCEDURE — 99285 EMERGENCY DEPT VISIT HI MDM: CPT

## 2024-07-06 VITALS
HEART RATE: 77 BPM | TEMPERATURE: 98 F | SYSTOLIC BLOOD PRESSURE: 104 MMHG | OXYGEN SATURATION: 96 % | RESPIRATION RATE: 18 BRPM | DIASTOLIC BLOOD PRESSURE: 63 MMHG

## 2024-07-06 DIAGNOSIS — F84.0 AUTISTIC DISORDER: ICD-10-CM

## 2024-07-06 DIAGNOSIS — F79 UNSPECIFIED INTELLECTUAL DISABILITIES: ICD-10-CM

## 2024-07-06 LAB
ALBUMIN SERPL ELPH-MCNC: 4 G/DL — SIGNIFICANT CHANGE UP (ref 3.3–5)
ALP SERPL-CCNC: 97 U/L — SIGNIFICANT CHANGE UP (ref 40–120)
ALT FLD-CCNC: 50 U/L — HIGH (ref 4–41)
ANION GAP SERPL CALC-SCNC: 15 MMOL/L — HIGH (ref 7–14)
APAP SERPL-MCNC: <10 UG/ML — LOW (ref 15–25)
APPEARANCE UR: CLEAR — SIGNIFICANT CHANGE UP
AST SERPL-CCNC: 27 U/L — SIGNIFICANT CHANGE UP (ref 4–40)
BASOPHILS # BLD AUTO: 0.04 K/UL — SIGNIFICANT CHANGE UP (ref 0–0.2)
BASOPHILS NFR BLD AUTO: 0.5 % — SIGNIFICANT CHANGE UP (ref 0–2)
BILIRUB SERPL-MCNC: 0.2 MG/DL — SIGNIFICANT CHANGE UP (ref 0.2–1.2)
BILIRUB UR-MCNC: NEGATIVE — SIGNIFICANT CHANGE UP
BUN SERPL-MCNC: 12 MG/DL — SIGNIFICANT CHANGE UP (ref 7–23)
CALCIUM SERPL-MCNC: 9.5 MG/DL — SIGNIFICANT CHANGE UP (ref 8.4–10.5)
CHLORIDE SERPL-SCNC: 100 MMOL/L — SIGNIFICANT CHANGE UP (ref 98–107)
CO2 SERPL-SCNC: 23 MMOL/L — SIGNIFICANT CHANGE UP (ref 22–31)
COLOR SPEC: YELLOW — SIGNIFICANT CHANGE UP
CREAT SERPL-MCNC: 0.8 MG/DL — SIGNIFICANT CHANGE UP (ref 0.5–1.3)
DIFF PNL FLD: NEGATIVE — SIGNIFICANT CHANGE UP
EGFR: 118 ML/MIN/1.73M2 — SIGNIFICANT CHANGE UP
EOSINOPHIL # BLD AUTO: 0.43 K/UL — SIGNIFICANT CHANGE UP (ref 0–0.5)
EOSINOPHIL NFR BLD AUTO: 5.2 % — SIGNIFICANT CHANGE UP (ref 0–6)
ETHANOL SERPL-MCNC: <10 MG/DL — SIGNIFICANT CHANGE UP
FLUAV AG NPH QL: SIGNIFICANT CHANGE UP
FLUBV AG NPH QL: SIGNIFICANT CHANGE UP
GLUCOSE SERPL-MCNC: 121 MG/DL — HIGH (ref 70–99)
GLUCOSE UR QL: NEGATIVE MG/DL — SIGNIFICANT CHANGE UP
HCT VFR BLD CALC: 39 % — SIGNIFICANT CHANGE UP (ref 39–50)
HGB BLD-MCNC: 12.6 G/DL — LOW (ref 13–17)
IANC: 4.09 K/UL — SIGNIFICANT CHANGE UP (ref 1.8–7.4)
IMM GRANULOCYTES NFR BLD AUTO: 0.6 % — SIGNIFICANT CHANGE UP (ref 0–0.9)
KETONES UR-MCNC: ABNORMAL MG/DL
LEUKOCYTE ESTERASE UR-ACNC: NEGATIVE — SIGNIFICANT CHANGE UP
LYMPHOCYTES # BLD AUTO: 3.19 K/UL — SIGNIFICANT CHANGE UP (ref 1–3.3)
LYMPHOCYTES # BLD AUTO: 38.5 % — SIGNIFICANT CHANGE UP (ref 13–44)
MCHC RBC-ENTMCNC: 25 PG — LOW (ref 27–34)
MCHC RBC-ENTMCNC: 32.3 GM/DL — SIGNIFICANT CHANGE UP (ref 32–36)
MCV RBC AUTO: 77.5 FL — LOW (ref 80–100)
MONOCYTES # BLD AUTO: 0.49 K/UL — SIGNIFICANT CHANGE UP (ref 0–0.9)
MONOCYTES NFR BLD AUTO: 5.9 % — SIGNIFICANT CHANGE UP (ref 2–14)
NEUTROPHILS # BLD AUTO: 4.09 K/UL — SIGNIFICANT CHANGE UP (ref 1.8–7.4)
NEUTROPHILS NFR BLD AUTO: 49.3 % — SIGNIFICANT CHANGE UP (ref 43–77)
NITRITE UR-MCNC: NEGATIVE — SIGNIFICANT CHANGE UP
NRBC # BLD: 0 /100 WBCS — SIGNIFICANT CHANGE UP (ref 0–0)
NRBC # FLD: 0 K/UL — SIGNIFICANT CHANGE UP (ref 0–0)
PCP SPEC-MCNC: SIGNIFICANT CHANGE UP
PH UR: 6 — SIGNIFICANT CHANGE UP (ref 5–8)
PLATELET # BLD AUTO: 229 K/UL — SIGNIFICANT CHANGE UP (ref 150–400)
POTASSIUM SERPL-MCNC: 4.7 MMOL/L — SIGNIFICANT CHANGE UP (ref 3.5–5.3)
POTASSIUM SERPL-SCNC: 4.7 MMOL/L — SIGNIFICANT CHANGE UP (ref 3.5–5.3)
PROT SERPL-MCNC: 6.1 G/DL — SIGNIFICANT CHANGE UP (ref 6–8.3)
PROT UR-MCNC: NEGATIVE MG/DL — SIGNIFICANT CHANGE UP
RBC # BLD: 5.03 M/UL — SIGNIFICANT CHANGE UP (ref 4.2–5.8)
RBC # FLD: 14.6 % — HIGH (ref 10.3–14.5)
RSV RNA NPH QL NAA+NON-PROBE: SIGNIFICANT CHANGE UP
SALICYLATES SERPL-MCNC: <0.3 MG/DL — LOW (ref 15–30)
SARS-COV-2 RNA SPEC QL NAA+PROBE: SIGNIFICANT CHANGE UP
SODIUM SERPL-SCNC: 138 MMOL/L — SIGNIFICANT CHANGE UP (ref 135–145)
SP GR SPEC: 1.02 — SIGNIFICANT CHANGE UP (ref 1–1.03)
TOXICOLOGY SCREEN, DRUGS OF ABUSE, SERUM RESULT: SIGNIFICANT CHANGE UP
UROBILINOGEN FLD QL: 0.2 MG/DL — SIGNIFICANT CHANGE UP (ref 0.2–1)
WBC # BLD: 8.29 K/UL — SIGNIFICANT CHANGE UP (ref 3.8–10.5)
WBC # FLD AUTO: 8.29 K/UL — SIGNIFICANT CHANGE UP (ref 3.8–10.5)

## 2024-07-06 PROCEDURE — 90792 PSYCH DIAG EVAL W/MED SRVCS: CPT

## 2024-07-06 PROCEDURE — 71045 X-RAY EXAM CHEST 1 VIEW: CPT | Mod: 26

## 2024-07-06 PROCEDURE — 73610 X-RAY EXAM OF ANKLE: CPT | Mod: 26,RT

## 2024-07-06 PROCEDURE — 93010 ELECTROCARDIOGRAM REPORT: CPT

## 2024-07-06 RX ORDER — ACETAMINOPHEN 325 MG
975 TABLET ORAL ONCE
Refills: 0 | Status: COMPLETED | OUTPATIENT
Start: 2024-07-06 | End: 2024-07-06

## 2024-07-10 ENCOUNTER — EMERGENCY (EMERGENCY)
Facility: HOSPITAL | Age: 34
LOS: 1 days | Discharge: HOME | End: 2024-07-10
Attending: STUDENT IN AN ORGANIZED HEALTH CARE EDUCATION/TRAINING PROGRAM | Admitting: STUDENT IN AN ORGANIZED HEALTH CARE EDUCATION/TRAINING PROGRAM
Payer: MEDICAID

## 2024-07-10 VITALS
TEMPERATURE: 98 F | RESPIRATION RATE: 17 BRPM | OXYGEN SATURATION: 100 % | SYSTOLIC BLOOD PRESSURE: 103 MMHG | HEART RATE: 97 BPM | DIASTOLIC BLOOD PRESSURE: 81 MMHG | HEIGHT: 71 IN

## 2024-07-10 DIAGNOSIS — Z90.79 ACQUIRED ABSENCE OF OTHER GENITAL ORGAN(S): Chronic | ICD-10-CM

## 2024-07-10 PROCEDURE — 73130 X-RAY EXAM OF HAND: CPT | Mod: 26,RT

## 2024-07-10 PROCEDURE — 99284 EMERGENCY DEPT VISIT MOD MDM: CPT

## 2024-07-11 ENCOUNTER — EMERGENCY (EMERGENCY)
Facility: HOSPITAL | Age: 34
LOS: 1 days | Discharge: HOME | End: 2024-07-11
Admitting: EMERGENCY MEDICINE
Payer: MEDICAID

## 2024-07-11 VITALS
HEART RATE: 63 BPM | OXYGEN SATURATION: 100 % | SYSTOLIC BLOOD PRESSURE: 117 MMHG | RESPIRATION RATE: 18 BRPM | DIASTOLIC BLOOD PRESSURE: 63 MMHG

## 2024-07-11 VITALS
OXYGEN SATURATION: 100 % | TEMPERATURE: 98 F | HEART RATE: 83 BPM | RESPIRATION RATE: 16 BRPM | DIASTOLIC BLOOD PRESSURE: 82 MMHG | SYSTOLIC BLOOD PRESSURE: 116 MMHG | HEIGHT: 71 IN

## 2024-07-11 DIAGNOSIS — Z90.79 ACQUIRED ABSENCE OF OTHER GENITAL ORGAN(S): Chronic | ICD-10-CM

## 2024-07-11 LAB
APPEARANCE UR: CLEAR — SIGNIFICANT CHANGE UP
BACTERIA # UR AUTO: NEGATIVE /HPF — SIGNIFICANT CHANGE UP
BILIRUB UR-MCNC: NEGATIVE — SIGNIFICANT CHANGE UP
CAST: 0 /LPF — SIGNIFICANT CHANGE UP (ref 0–4)
COLOR SPEC: YELLOW — SIGNIFICANT CHANGE UP
DIFF PNL FLD: NEGATIVE — SIGNIFICANT CHANGE UP
GLUCOSE UR QL: NEGATIVE MG/DL — SIGNIFICANT CHANGE UP
KETONES UR-MCNC: ABNORMAL MG/DL
LEUKOCYTE ESTERASE UR-ACNC: ABNORMAL
NITRITE UR-MCNC: NEGATIVE — SIGNIFICANT CHANGE UP
PH UR: 7 — SIGNIFICANT CHANGE UP (ref 5–8)
PROT UR-MCNC: NEGATIVE MG/DL — SIGNIFICANT CHANGE UP
RBC CASTS # UR COMP ASSIST: 0 /HPF — SIGNIFICANT CHANGE UP (ref 0–4)
SP GR SPEC: 1.02 — SIGNIFICANT CHANGE UP (ref 1–1.03)
SQUAMOUS # UR AUTO: 1 /HPF — SIGNIFICANT CHANGE UP (ref 0–5)
UROBILINOGEN FLD QL: 1 MG/DL — SIGNIFICANT CHANGE UP (ref 0.2–1)
WBC UR QL: 0 /HPF — SIGNIFICANT CHANGE UP (ref 0–5)

## 2024-07-11 PROCEDURE — 99285 EMERGENCY DEPT VISIT HI MDM: CPT

## 2024-07-11 PROCEDURE — 73610 X-RAY EXAM OF ANKLE: CPT | Mod: 26,RT

## 2024-07-11 PROCEDURE — 73110 X-RAY EXAM OF WRIST: CPT | Mod: 26,RT

## 2024-07-11 PROCEDURE — 73630 X-RAY EXAM OF FOOT: CPT | Mod: 26,RT

## 2024-07-11 RX ORDER — CHLORPROMAZINE HYDROCHLORIDE 200 MG/1
25 TABLET, FILM COATED ORAL ONCE
Refills: 0 | Status: COMPLETED | OUTPATIENT
Start: 2024-07-11 | End: 2024-07-11

## 2024-07-11 RX ORDER — LORAZEPAM 0.5 MG
2 TABLET ORAL ONCE
Refills: 0 | Status: DISCONTINUED | OUTPATIENT
Start: 2024-07-11 | End: 2024-07-11

## 2024-07-11 RX ADMIN — Medication 2 MILLIGRAM(S): at 23:27

## 2024-07-11 RX ADMIN — CHLORPROMAZINE HYDROCHLORIDE 25 MILLIGRAM(S): 200 TABLET, FILM COATED ORAL at 23:27

## 2024-07-12 VITALS
SYSTOLIC BLOOD PRESSURE: 116 MMHG | HEART RATE: 78 BPM | TEMPERATURE: 98 F | RESPIRATION RATE: 17 BRPM | DIASTOLIC BLOOD PRESSURE: 84 MMHG | OXYGEN SATURATION: 98 %

## 2024-07-12 LAB
CULTURE RESULTS: SIGNIFICANT CHANGE UP
SPECIMEN SOURCE: SIGNIFICANT CHANGE UP

## 2024-07-17 PROCEDURE — L9997: CPT

## 2024-07-19 NOTE — ED BEHAVIORAL HEALTH ASSESSMENT NOTE - SUICIDALITY
Patient Seen in: [facility]      History     Chief Complaint   Patient presents with    Fall     Stated Complaint: Fall    Subjective:   HPI  Patient is a 53-year-old female who presents to the immediate care center with concern for pain to the right knee, right lower leg.  Yesterday, she slipped in the shower.  She did not fall to the ground, but in the process of slipping, she felt acute pain to the posterior knee and proximal lower leg.  She then, stepped off a curb today, provoking the injury.  She is able to ambulate without difficulty.  She has had no motor or sensory deficit.          Objective:   Past Medical History:    Hepatic lesion    multiple, FNH/hemangioma/adenoma? repeat MRI in Dec 2020    History of blood transfusion    Screen for colon cancer    repeat CLN in 10 yrs    Sleep apnea              Past Surgical History:   Procedure Laterality Date    Total abdom hysterectomy  04/03/2023                No pertinent social history.            Review of Systems   Constitutional: Negative.    Musculoskeletal:  Positive for arthralgias.   Skin:  Negative for wound.   Neurological:  Negative for weakness and numbness.       Positive for stated Chief Complaint: Fall    Other systems are as noted in HPI.  Constitutional and vital signs reviewed.      All other systems reviewed and negative except as noted above.    Physical Exam     ED Triage Vitals [07/18/24 1920]   /64   Pulse 71   Resp 18   Temp 98.6 °F (37 °C)   Temp src Temporal   SpO2 100 %   O2 Device None (Room air)       Current Vitals:   Vital Signs  BP: 151/64  Pulse: 71  Resp: 18  Temp: 98.6 °F (37 °C)  Temp src: Temporal    Oxygen Therapy  SpO2: 100 %  O2 Device: None (Room air)            Physical Exam  Vitals and nursing note reviewed.   Constitutional:       General: She is not in acute distress.     Appearance: She is not ill-appearing.   Cardiovascular:      Pulses: Normal pulses.   Musculoskeletal:      Right knee: No  swelling or deformity. No tenderness.      Right lower leg: Tenderness present.      Right ankle: No swelling or deformity. No tenderness.        Legs:    Skin:     General: Skin is warm and dry.   Neurological:      Mental Status: She is alert and oriented to person, place, and time.   Psychiatric:         Behavior: Behavior normal.               ED Course   Labs Reviewed - No data to display  XR TIBIA + FIBULA (2 VIEWS), RIGHT (CPT=73590)   Final Result   PROCEDURE: XR TIBIA + FIBULA (2 VIEWS), RIGHT (CPT=73590)       COMPARISON: None.       INDICATIONS: Right lateral leg pain post fall yesterday and again today.       TECHNIQUE: 2 views were obtained.         FINDINGS:    BONES: No significant arthropathy, fracture or acute abnormality.   SOFT TISSUES: Mild inflammation in the posterior and lateral aspect of the    right calf at the fat-muscle interface.   EFFUSION: None visible.    OTHER: Negative.                    =====   CONCLUSION: No acute osseous abnormality of the right tibia or fibula.     Soft tissue inflammation in the right lateral and posterior calf region    suggesting soft tissue injury.         Dictated by (CST): Jake Vences MD on 7/18/2024 at 7:49 PM        Finalized by (CST): Jake Vences MD on 7/18/2024 at 7:50 PM                 Patient was offered a cane or crutch to support her gait.  She declined stating she did not feel she needed it.                 MDM      Reviewed results of xray; discussed that despite no obvious fx/dislocation, there is potential for serious ligamentous injury that could possibly require intervention. Pt was advised that they MUST f/u 5-7 days if pain persists for further evaluation and treatment.                                      Medical Decision Making  Differential diagnoses considered today include, but are not exclusive of: fracture, dislocation, strain, sprain, vascular compromise, and nerve impingement syndrome.      Problems Addressed:  Arthralgia of  right lower leg: self-limited or minor problem    Amount and/or Complexity of Data Reviewed  Radiology:  Decision-making details documented in ED Course.    Risk  OTC drugs.  Prescription drug management.        Disposition and Plan     Clinical Impression:  1. Arthralgia of right lower leg         Disposition:  Discharge  7/18/2024  8:04 pm    Follow-up:  Leighton Reyes, DO  [address] [place] [id number]  [phone]    Schedule an appointment as soon as possible for a visit in 1 week  As needed          Medications Prescribed:  Current Discharge Medication List        START taking these medications    Details   naproxen 500 MG Oral Tab Take 1 tablet (500 mg total) by mouth 2 (two) times daily as needed.  Qty: 20 tablet, Refills: 0                                             No

## 2024-07-21 ENCOUNTER — NON-APPOINTMENT (OUTPATIENT)
Age: 34
End: 2024-07-21

## 2024-07-27 ENCOUNTER — EMERGENCY (EMERGENCY)
Facility: HOSPITAL | Age: 34
LOS: 1 days | Discharge: HOME | End: 2024-07-27
Attending: STUDENT IN AN ORGANIZED HEALTH CARE EDUCATION/TRAINING PROGRAM
Payer: MEDICAID

## 2024-07-27 VITALS
RESPIRATION RATE: 20 BRPM | SYSTOLIC BLOOD PRESSURE: 108 MMHG | TEMPERATURE: 98 F | OXYGEN SATURATION: 95 % | HEART RATE: 80 BPM | DIASTOLIC BLOOD PRESSURE: 77 MMHG

## 2024-07-27 VITALS
OXYGEN SATURATION: 97 % | HEIGHT: 71 IN | TEMPERATURE: 98 F | WEIGHT: 294.98 LBS | DIASTOLIC BLOOD PRESSURE: 77 MMHG | HEART RATE: 85 BPM | SYSTOLIC BLOOD PRESSURE: 111 MMHG | RESPIRATION RATE: 18 BRPM

## 2024-07-27 DIAGNOSIS — Z90.79 ACQUIRED ABSENCE OF OTHER GENITAL ORGAN(S): Chronic | ICD-10-CM

## 2024-07-27 PROCEDURE — 73610 X-RAY EXAM OF ANKLE: CPT

## 2024-07-27 PROCEDURE — 73610 X-RAY EXAM OF ANKLE: CPT | Mod: 26,RT

## 2024-07-27 PROCEDURE — 99284 EMERGENCY DEPT VISIT MOD MDM: CPT

## 2024-07-27 PROCEDURE — 99284 EMERGENCY DEPT VISIT MOD MDM: CPT | Mod: 25

## 2024-07-27 PROCEDURE — 73110 X-RAY EXAM OF WRIST: CPT | Mod: 26,RT

## 2024-07-27 PROCEDURE — 73110 X-RAY EXAM OF WRIST: CPT

## 2024-07-27 RX ORDER — ACETAMINOPHEN 325 MG
975 TABLET ORAL ONCE
Refills: 0 | Status: COMPLETED | OUTPATIENT
Start: 2024-07-27 | End: 2024-07-27

## 2024-07-27 RX ADMIN — Medication 600 MILLIGRAM(S): at 15:37

## 2024-07-27 RX ADMIN — Medication 975 MILLIGRAM(S): at 16:37

## 2024-07-27 RX ADMIN — Medication 600 MILLIGRAM(S): at 16:37

## 2024-07-27 RX ADMIN — Medication 975 MILLIGRAM(S): at 15:37

## 2024-07-27 NOTE — ED ADULT TRIAGE NOTE - AS PAIN REST
7 (severe pain) Fluconazole Pregnancy And Lactation Text: This medication is Pregnancy Category C and it isn't know if it is safe during pregnancy. It is also excreted in breast milk.

## 2024-07-27 NOTE — ED PROVIDER NOTE - CLINICAL SUMMARY MEDICAL DECISION MAKING FREE TEXT BOX
34-year-old male presenting with right wrist and ankle pain.  Patient ambulatory in the emergency department without assistance and has full range of motion of joints in question.  No fracture visualized on x-ray.  Patient stable for outpatient follow-up.

## 2024-07-27 NOTE — ED ADULT NURSE NOTE - NSFALLRISKINTERV_ED_ALL_ED

## 2024-07-27 NOTE — ED PROVIDER NOTE - PHYSICAL EXAMINATION
General: well appearing male, no acute distress   HEENT: normocephalic, atraumatic   Respiratory: normal work of breathing  Cardiac: regular rate and rhythm   Abdomen: soft, non-tender, no guarding or rebound   MSK: full wrist and ankle ROM, right medial malleolus tenderness to palpation   Skin: warm, dry   Neuro: A&Ox3  Psych: appropriate affect

## 2024-07-27 NOTE — ED PROVIDER NOTE - NSFOLLOWUPINSTRUCTIONS_ED_ALL_ED_FT
You were seen in the emergency department for right ankle and wrist pain.     Please follow-up with your primary care doctor within the next 24-48 hours.    Please take Ibuprofen and Tylenol as prescribed on the bottles for pain control.     If you have any worsening symptoms, severe arm or leg pain, swelling, numbness or tingling, please return to the emergency department.

## 2024-07-27 NOTE — ED ADULT NURSE NOTE - CAS ELECT INFOMATION PROVIDED
DC instructions patient D/C by DR Mccann left facility with steady gait, accompanied by aide/DC instructions

## 2024-07-27 NOTE — ED ADULT TRIAGE NOTE - CHIEF COMPLAINT QUOTE
Patient reports right foot pain and right wrist pain for a month. Patient reports he fell a month ago.

## 2024-07-27 NOTE — ED PROVIDER NOTE - OBJECTIVE STATEMENT
34-year-old male presenting with right wrist and foot pain.  Patient reports he recently broke his right foot and hand after punching things as well as falling down.  States he still has pain and swelling.

## 2024-07-27 NOTE — ED PROVIDER NOTE - PATIENT PORTAL LINK FT
You can access the FollowMyHealth Patient Portal offered by [facility] by registering at the following website: [email or web address]. By joining [employer]’s FollowMyHealth portal, you will also be able to view your health information using other applications (apps) compatible with our system.

## 2024-07-30 NOTE — ED ADULT NURSE NOTE - DOES PATIENT HAVE ADVANCE DIRECTIVE
"Ernst syndrome. However, men with Ernst Ernst Syndrome    Ernst Syndrome International -  [email or web address].[email or web address]   Ernst [employer] - [email or web address]   [facility] Arlin Madrid MS, [facility]  Licensed, Certified Genetic Counselor  [facility]  [email or web address]      " No

## 2024-08-11 NOTE — ED PROVIDER NOTE - NSTIMEPROVIDERCAREINITIATE_GEN_ER
[___ Weeks Post Op] : [unfilled] weeks post op [de-identified] : 7/22/2024 - IM nail for left femur pathologic fracture secondary to B-cell lymphoma 7/16/2024 - biopsy of left femur with short revisional ORIF [de-identified] : Patient is feeling generally well overall since his recent surgeries. He can walk with a walker and can stand without assistance. He complains of some stiffness of his left knee. He is seeing another hematology/oncology specialist at [facility] that will be starting R-CHOP regimen starting next week. He will start this for several months until completing radiation to the thigh later. [de-identified] : On exam he still has some swelling in his left thigh but calves are soft and nontender and he is able to move distally. His Lachmann screw incisions at the knee still have not healed completely but do not appear to be infected. Left knee ROM is from 0-80 degrees, can be pushed a little bit farther. 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 I will write him for PT and he can start weightbearing as tolerated. [de-identified] : Follow up in 4-6 weeks for radiographic and clinical surveillance. He is being treated medically at [facility] for medical oncology.  If imaging or pathology/biopsy was ordered, the patient was told to make an appointment to review findings right after all imaging is completed.   We discussed risks, benefits and alternatives. Rationale of care was reviewed and all questions were answered. Patient (and family) had all questions answered to an agreeable level of satisfaction. Patient (and family) expressed understanding and interest in proceeding with the plan as outlined. 13-Oct-2023 01:18

## 2024-08-14 ENCOUNTER — EMERGENCY (EMERGENCY)
Facility: HOSPITAL | Age: 34
LOS: 1 days | End: 2024-08-14
Attending: STUDENT IN AN ORGANIZED HEALTH CARE EDUCATION/TRAINING PROGRAM
Payer: MEDICAID

## 2024-08-14 VITALS
DIASTOLIC BLOOD PRESSURE: 88 MMHG | RESPIRATION RATE: 17 BRPM | WEIGHT: 186.07 LBS | TEMPERATURE: 96 F | SYSTOLIC BLOOD PRESSURE: 120 MMHG | OXYGEN SATURATION: 98 % | HEART RATE: 70 BPM | HEIGHT: 69 IN

## 2024-08-14 DIAGNOSIS — Z90.79 ACQUIRED ABSENCE OF OTHER GENITAL ORGAN(S): Chronic | ICD-10-CM

## 2024-08-14 PROCEDURE — L9991: CPT

## 2024-08-16 ENCOUNTER — EMERGENCY (EMERGENCY)
Facility: HOSPITAL | Age: 34
LOS: 1 days | Discharge: HOME | End: 2024-08-16
Attending: INTERNAL MEDICINE | Admitting: INTERNAL MEDICINE
Payer: MEDICAID

## 2024-08-16 PROCEDURE — 99284 EMERGENCY DEPT VISIT MOD MDM: CPT

## 2024-08-17 VITALS
HEIGHT: 69 IN | TEMPERATURE: 98 F | WEIGHT: 186.07 LBS | HEART RATE: 79 BPM | RESPIRATION RATE: 16 BRPM | OXYGEN SATURATION: 96 % | SYSTOLIC BLOOD PRESSURE: 120 MMHG | DIASTOLIC BLOOD PRESSURE: 82 MMHG

## 2024-08-17 LAB
ALBUMIN SERPL ELPH-MCNC: 3.3 G/DL — SIGNIFICANT CHANGE UP (ref 3.3–5)
ALP SERPL-CCNC: 93 U/L — SIGNIFICANT CHANGE UP (ref 40–120)
ALT FLD-CCNC: 50 U/L — SIGNIFICANT CHANGE UP (ref 12–78)
ANION GAP SERPL CALC-SCNC: 7 MMOL/L — SIGNIFICANT CHANGE UP (ref 5–17)
APPEARANCE UR: CLEAR — SIGNIFICANT CHANGE UP
AST SERPL-CCNC: 23 U/L — SIGNIFICANT CHANGE UP (ref 15–37)
BILIRUB SERPL-MCNC: 0.2 MG/DL — SIGNIFICANT CHANGE UP (ref 0.2–1.2)
BILIRUB UR-MCNC: NEGATIVE — SIGNIFICANT CHANGE UP
BUN SERPL-MCNC: 11 MG/DL — SIGNIFICANT CHANGE UP (ref 7–23)
CALCIUM SERPL-MCNC: 9.3 MG/DL — SIGNIFICANT CHANGE UP (ref 8.5–10.1)
CHLORIDE SERPL-SCNC: 104 MMOL/L — SIGNIFICANT CHANGE UP (ref 96–108)
CO2 SERPL-SCNC: 27 MMOL/L — SIGNIFICANT CHANGE UP (ref 22–31)
COLOR SPEC: YELLOW — SIGNIFICANT CHANGE UP
CREAT SERPL-MCNC: 0.86 MG/DL — SIGNIFICANT CHANGE UP (ref 0.5–1.3)
DIFF PNL FLD: NEGATIVE — SIGNIFICANT CHANGE UP
EGFR: 116 ML/MIN/1.73M2 — SIGNIFICANT CHANGE UP
GLUCOSE SERPL-MCNC: 180 MG/DL — HIGH (ref 70–99)
GLUCOSE UR QL: NEGATIVE MG/DL — SIGNIFICANT CHANGE UP
HCT VFR BLD CALC: 39.1 % — SIGNIFICANT CHANGE UP (ref 39–50)
HGB BLD-MCNC: 11.9 G/DL — LOW (ref 13–17)
KETONES UR-MCNC: NEGATIVE MG/DL — SIGNIFICANT CHANGE UP
LEUKOCYTE ESTERASE UR-ACNC: NEGATIVE — SIGNIFICANT CHANGE UP
MCHC RBC-ENTMCNC: 23.8 PG — LOW (ref 27–34)
MCHC RBC-ENTMCNC: 30.4 GM/DL — LOW (ref 32–36)
MCV RBC AUTO: 78.2 FL — LOW (ref 80–100)
NITRITE UR-MCNC: NEGATIVE — SIGNIFICANT CHANGE UP
NRBC # BLD: 0 /100 WBCS — SIGNIFICANT CHANGE UP (ref 0–0)
PH UR: 6 — SIGNIFICANT CHANGE UP (ref 5–8)
PLATELET # BLD AUTO: 219 K/UL — SIGNIFICANT CHANGE UP (ref 150–400)
POTASSIUM SERPL-MCNC: 3.9 MMOL/L — SIGNIFICANT CHANGE UP (ref 3.5–5.3)
POTASSIUM SERPL-SCNC: 3.9 MMOL/L — SIGNIFICANT CHANGE UP (ref 3.5–5.3)
PROT SERPL-MCNC: 6.3 G/DL — SIGNIFICANT CHANGE UP (ref 6–8.3)
PROT UR-MCNC: NEGATIVE MG/DL — SIGNIFICANT CHANGE UP
RBC # BLD: 5 M/UL — SIGNIFICANT CHANGE UP (ref 4.2–5.8)
RBC # FLD: 14.6 % — HIGH (ref 10.3–14.5)
SODIUM SERPL-SCNC: 138 MMOL/L — SIGNIFICANT CHANGE UP (ref 135–145)
SP GR SPEC: 1 — SIGNIFICANT CHANGE UP (ref 1–1.03)
UROBILINOGEN FLD QL: 0.2 MG/DL — SIGNIFICANT CHANGE UP (ref 0.2–1)
WBC # BLD: 6.03 K/UL — SIGNIFICANT CHANGE UP (ref 3.8–10.5)
WBC # FLD AUTO: 6.03 K/UL — SIGNIFICANT CHANGE UP (ref 3.8–10.5)

## 2024-08-17 PROCEDURE — 99284 EMERGENCY DEPT VISIT MOD MDM: CPT | Mod: 25

## 2024-08-17 PROCEDURE — 80053 COMPREHEN METABOLIC PANEL: CPT

## 2024-08-17 PROCEDURE — 74176 CT ABD & PELVIS W/O CONTRAST: CPT | Mod: MC

## 2024-08-17 PROCEDURE — 81003 URINALYSIS AUTO W/O SCOPE: CPT

## 2024-08-17 PROCEDURE — 36415 COLL VENOUS BLD VENIPUNCTURE: CPT

## 2024-08-17 PROCEDURE — 74176 CT ABD & PELVIS W/O CONTRAST: CPT | Mod: 26,MC

## 2024-08-17 PROCEDURE — 85027 COMPLETE CBC AUTOMATED: CPT

## 2024-08-17 RX ORDER — PHENAZOPYRIDINE HYDROCHLORIDE 200 MG/1
200 TABLET ORAL ONCE
Refills: 0 | Status: DISCONTINUED | OUTPATIENT
Start: 2024-08-17 | End: 2024-08-20

## 2024-08-17 RX ORDER — BACTERIOSTATIC SODIUM CHLORIDE 0.9 %
1000 VIAL (ML) INJECTION ONCE
Refills: 0 | Status: COMPLETED | OUTPATIENT
Start: 2024-08-17 | End: 2024-08-17

## 2024-08-17 RX ADMIN — Medication 1000 MILLILITER(S): at 00:40

## 2024-08-17 NOTE — ED ADULT NURSE NOTE - OBJECTIVE STATEMENT
patient is a&ox3, ambulatory no scute distress, presents with urinary issues. accompanied by care giver from group facility. -labs sent, meds given . pending urine , cup provided for sample. patient is a&ox3, ambulatory no scute distress, presents with urinary issues blood tinged urine. accompanied by care giver from group facility. -labs sent, meds given . pending urine , cup provided for sample. patient currently prescribed abx for UTI

## 2024-08-17 NOTE — ED PROVIDER NOTE - CARE PROVIDER_API CALL
Zafar Keita  Urology  [address], [address]  [place] [address]  Phone: [phone]  Fax: [phone]  Follow Up Time: 1-3 Days   Tranexamic Acid Pregnancy And Lactation Text: It is unknown if this medication is safe during pregnancy or breast feeding.

## 2024-08-17 NOTE — ED PROVIDER NOTE - PATIENT PORTAL LINK FT
You can access the FollowMyHealth Patient Portal offered by [facility] by registering at the following website: [email or web address]. By joining [facility]’s FollowMyHealth portal, you will also be able to view your health information using other applications (apps) compatible with our system.

## 2024-08-17 NOTE — ED PROVIDER NOTE - CLINICAL SUMMARY MEDICAL DECISION MAKING FREE TEXT BOX
33 y/o male with UTI symptoms and was started of Macrobid  prescribed by his PMD,  c/o of dysuria and mild back pain on exam. no headache, no fever, no chills, no toxemia,  no chest pain, no SOB, no abdominal pain, no palpitations, no n/v/d,  no neuro changes. VSS , exam stable , Labs U/A and CT are normal, Rx pyridium given for the patients symptoms in addition to Macrobid, follow up out patient

## 2024-08-17 NOTE — ED PROVIDER NOTE - SIGNIFICANT NEGATIVE FINDINGS
no headache, no fever, no chills, no toxemia,  no chest pain, no SOB, no abdominal pain, no palpitations, no n/v/d,  no neuro changes.

## 2024-08-17 NOTE — ED ADULT NURSE NOTE - NSHOSCREENINGQ1_ED_ALL_ED
Problem: PAIN - ADULT  Goal: Verbalizes/displays adequate comfort level or baseline comfort level  Description: Interventions:  - Encourage patient to monitor pain and request assistance  - Assess pain using appropriate pain scale  - Administer analgesics based on type and severity of pain and evaluate response  - Implement non-pharmacological measures as appropriate and evaluate response  - Consider cultural and social influences on pain and pain management  - Notify physician/advanced practitioner if interventions unsuccessful or patient reports new pain  Outcome: Progressing     Problem: INFECTION - ADULT  Goal: Absence or prevention of progression during hospitalization  Description: INTERVENTIONS:  - Assess and monitor for signs and symptoms of infection  - Monitor lab/diagnostic results  - Monitor all insertion sites, i e  indwelling lines, tubes, and drains  - Monitor endotracheal if appropriate and nasal secretions for changes in amount and color  - [place] appropriate cooling/warming therapies per order  - Administer medications as ordered  - Instruct and encourage patient and family to use good hand hygiene technique  - Identify and instruct in appropriate isolation precautions for identified infection/condition  Outcome: Progressing  Goal: Absence of fever/infection during neutropenic period  Description: INTERVENTIONS:  - Monitor WBC    Outcome: Progressing     Problem: SAFETY ADULT  Goal: Patient will remain free of falls  Description: INTERVENTIONS:  - Educate patient/family on patient safety including physical limitations  - Instruct patient to call for assistance with activity   - Consult OT/PT to assist with strengthening/mobility   - Keep Call bell within reach  - Keep bed low and locked with side rails adjusted as appropriate  - Keep care items and personal belongings within reach  - Initiate and maintain comfort rounds  - Make Fall Risk Sign visible to staff  - Offer Toileting every  Hours, in advance of need  - Initiate/Maintain alarm  - Obtain necessary fall risk management equipment:   - Apply yellow socks and bracelet for high fall risk patients  - Consider moving patient to room near nurses station  Outcome: Progressing  Goal: Maintain or return to baseline ADL function  Description: INTERVENTIONS:  -  Assess patient's ability to carry out ADLs; assess patient's baseline for ADL function and identify physical deficits which impact ability to perform ADLs (bathing, care of mouth/teeth, toileting, grooming, dressing, etc )  - Assess/evaluate cause of self-care deficits   - Assess range of motion  - Assess patient's mobility; develop plan if impaired  - Assess patient's need for assistive devices and provide as appropriate  - Encourage maximum independence but intervene and supervise when necessary  - Involve family in performance of ADLs  - Assess for home care needs following discharge   - Consider OT consult to assist with ADL evaluation and planning for discharge  - Provide patient education as appropriate  Outcome: Progressing  Goal: Maintains/Returns to pre admission functional level  Description: INTERVENTIONS:  - Perform BMAT or MOVE assessment daily    - Set and communicate daily mobility goal to care team and patient/family/caregiver  - Collaborate with rehabilitation services on mobility goals if consulted  - Perform Range of Motion  times a day  - Reposition patient every  hours    - Dangle patient  times a day  - Stand patient  times a day  - Ambulate patient  times a day  - Out of bed to chair  times a day   - Out of bed for meals  times a day  - Out of bed for toileting  - Record patient progress and toleration of activity level   Outcome: Progressing     Problem: DISCHARGE PLANNING  Goal: Discharge to home or other facility with appropriate resources  Description: INTERVENTIONS:  - Identify barriers to discharge w/patient and caregiver  - Arrange for needed discharge resources and transportation as appropriate  - Identify discharge learning needs (meds, wound care, etc )  - Arrange for interpretive services to assist at discharge as needed  - Refer to Case Management Department for coordinating discharge planning if the patient needs post-hospital services based on physician/advanced practitioner order or complex needs related to functional status, cognitive ability, or social support system  Outcome: Progressing     Problem: Knowledge Deficit  Goal: Patient/family/caregiver demonstrates understanding of disease process, treatment plan, medications, and discharge instructions  Description: Complete learning assessment and assess knowledge base  Interventions:  - Provide teaching at level of understanding  - Provide teaching via preferred learning methods  Outcome: Progressing     Problem: Potential for Falls  Goal: Patient will remain free of falls  Description: INTERVENTIONS:  - Educate patient/family on patient safety including physical limitations  - Instruct patient to call for assistance with activity   - Consult OT/PT to assist with strengthening/mobility   - Keep Call bell within reach  - Keep bed low and locked with side rails adjusted as appropriate  - Keep care items and personal belongings within reach  - Initiate and maintain comfort rounds  - Make Fall Risk Sign visible to staff  - Offer Toileting every  Hours, in advance of need  - Initiate/Maintain alarm  - Obtain necessary fall risk management equipment  - Apply yellow socks and bracelet for high fall risk patients  - Consider moving patient to room near nurses station  Outcome: Progressing     Problem: Nutrition/Hydration-ADULT  Goal: Nutrient/Hydration intake appropriate for improving, restoring or maintaining nutritional needs  Description: Monitor and assess patient's nutrition/hydration status for malnutrition  Collaborate with interdisciplinary team and initiate plan and interventions as ordered    Monitor patient's weight and dietary intake as ordered or per policy  Utilize nutrition screening tool and intervene as necessary  Determine patient's food preferences and provide high-protein, high-caloric foods as appropriate       INTERVENTIONS:  - Monitor oral intake, urinary output, labs, and treatment plans  - Assess nutrition and hydration status and recommend course of action  - Evaluate amount of meals eaten  - Assist patient with eating if necessary   - Allow adequate time for meals  - Recommend/ encourage appropriate diets, oral nutritional supplements, and vitamin/mineral supplements  - Order, calculate, and assess calorie counts as needed  - Recommend, monitor, and adjust tube feedings and TPN/PPN based on assessed needs  - Assess need for intravenous fluids  - Provide specific nutrition/hydration education as appropriate  - Include patient/family/caregiver in decisions related to nutrition  Outcome: Progressing No

## 2024-08-17 NOTE — ED PROVIDER NOTE - NSFOLLOWUPINSTRUCTIONS_ED_ALL_ED_FT
Recommend continuing taking the antibiotic prescribe to you. Follow up with your primary care doctor or the urology referral

## 2024-08-17 NOTE — ED BEHAVIORAL HEALTH ASSESSMENT NOTE - REASON FOR REFERRAL
Need for Home Care Service was communicated by Care Team.       Spoke with patient spouse regarding need for home care. Patient is agreeable to home care services. The patient will benefit from home care services of PT,OT (disciplines, skilled need)    Homebound criteria was discussed in detail and Carlie verbalizes understanding.   Patient meets homebound criteria because weakness which limits endurance.     Information provided to spouse along with [facility] contact information. Patient/caregiver understands that home care is an intermittent level of care and visits may not occur daily. Patient/caregiver is teachable and willing to learn yes.  Teach back demonstrated by patient.     Address, phone number, email, and insurance verified with spouse as per Epic records. Best contact number is [phone] .    Patient does have special communication needs. Those special communication needs are needs [facility]  .     Patient does not have active [employer] Portal.  Patient does/does not have video visit capability (tablet, smartphone, or computer with a video camera).    Patient's support system is Carlie (name of support system)      Active with a skilled home health agency prior to admission? no (Yes/No).     Does the patient have any unskilled agencies in the home, such as caregiver, cleaning services, meal assistance, private nurse? no (Yes/No)    Verified with team that following provider is Mitchell Melton and willing to follow for home care.    Any scheduling requests: no    Presence of bedbugs and/or infestation of any bugs in the home: no    Provided patient with the following 1st visit reminders:   Have insurance card, medications, and vitamins and OTCs available to review with    Any discharge paperwork for the  to review available   Please write down any questions you have, to review with nurse/therapist during the first visit   For safety, we ask that any  pets or firearms are secured in the home prior to nurse/therapist visiting.    Liaison will continue to follow until discharge.   Anticipated dc date is TBD.      Clare Guillaume RN  Home Care Liaison          Suicidal ideation

## 2024-08-17 NOTE — ED PROVIDER NOTE - OBJECTIVE STATEMENT
began taking unknown antibiotic today for UTI, c/o painful urination, pink tinged urine  urinary/bladder trouble 33 y/o male with UTI symptoms and was started of Macrobid  prescribed by his PMD,  c/o of dysuria and mild back pain on exam. no headache, no fever, no chills, no toxemia,  no chest pain, no SOB, no abdominal pain, no palpitations, no n/v/d,  no neuro changes.

## 2024-08-17 NOTE — ED ADULT TRIAGE NOTE - SPO2 (%)
Patient examined, record reviewed, agree with findings and recommendations as documented above.   96

## 2024-08-18 ENCOUNTER — EMERGENCY (EMERGENCY)
Facility: HOSPITAL | Age: 34
LOS: 1 days | Discharge: HOME | End: 2024-08-18
Attending: STUDENT IN AN ORGANIZED HEALTH CARE EDUCATION/TRAINING PROGRAM | Admitting: STUDENT IN AN ORGANIZED HEALTH CARE EDUCATION/TRAINING PROGRAM
Payer: MEDICAID

## 2024-08-18 ENCOUNTER — OUTPATIENT (OUTPATIENT)
Dept: OUTPATIENT SERVICES | Facility: HOSPITAL | Age: 34
LOS: 1 days | Discharge: HOME | End: 2024-08-18

## 2024-08-18 VITALS
RESPIRATION RATE: 16 BRPM | HEART RATE: 74 BPM | TEMPERATURE: 98 F | DIASTOLIC BLOOD PRESSURE: 75 MMHG | SYSTOLIC BLOOD PRESSURE: 111 MMHG | OXYGEN SATURATION: 99 %

## 2024-08-18 VITALS
RESPIRATION RATE: 17 BRPM | SYSTOLIC BLOOD PRESSURE: 104 MMHG | TEMPERATURE: 98 F | HEART RATE: 91 BPM | DIASTOLIC BLOOD PRESSURE: 62 MMHG | OXYGEN SATURATION: 96 % | WEIGHT: 315 LBS | HEIGHT: 69 IN

## 2024-08-18 DIAGNOSIS — C62.90 MALIGNANT NEOPLASM OF UNSPECIFIED TESTIS, UNSPECIFIED WHETHER DESCENDED OR UNDESCENDED: ICD-10-CM

## 2024-08-18 DIAGNOSIS — Z90.79 ACQUIRED ABSENCE OF OTHER GENITAL ORGAN(S): Chronic | ICD-10-CM

## 2024-08-18 LAB
ALBUMIN SERPL ELPH-MCNC: 3.6 G/DL — SIGNIFICANT CHANGE UP (ref 3.3–5)
ALP SERPL-CCNC: 98 U/L — SIGNIFICANT CHANGE UP (ref 40–120)
ALT FLD-CCNC: 57 U/L — SIGNIFICANT CHANGE UP (ref 12–78)
ANION GAP SERPL CALC-SCNC: 6 MMOL/L — SIGNIFICANT CHANGE UP (ref 5–17)
APPEARANCE UR: CLEAR — SIGNIFICANT CHANGE UP
AST SERPL-CCNC: 29 U/L — SIGNIFICANT CHANGE UP (ref 15–37)
BASOPHILS # BLD AUTO: 0.03 K/UL — SIGNIFICANT CHANGE UP (ref 0–0.2)
BASOPHILS NFR BLD AUTO: 0.4 % — SIGNIFICANT CHANGE UP (ref 0–2)
BILIRUB SERPL-MCNC: 0.2 MG/DL — SIGNIFICANT CHANGE UP (ref 0.2–1.2)
BILIRUB UR-MCNC: NEGATIVE — SIGNIFICANT CHANGE UP
BUN SERPL-MCNC: 14 MG/DL — SIGNIFICANT CHANGE UP (ref 7–23)
CALCIUM SERPL-MCNC: 9.4 MG/DL — SIGNIFICANT CHANGE UP (ref 8.5–10.1)
CHLORIDE SERPL-SCNC: 104 MMOL/L — SIGNIFICANT CHANGE UP (ref 96–108)
CK MB CFR SERPL CALC: 2.2 NG/ML — SIGNIFICANT CHANGE UP (ref 0–3.6)
CO2 SERPL-SCNC: 28 MMOL/L — SIGNIFICANT CHANGE UP (ref 22–31)
COLOR SPEC: YELLOW — SIGNIFICANT CHANGE UP
CREAT SERPL-MCNC: 0.9 MG/DL — SIGNIFICANT CHANGE UP (ref 0.5–1.3)
DIFF PNL FLD: NEGATIVE — SIGNIFICANT CHANGE UP
EGFR: 114 ML/MIN/1.73M2 — SIGNIFICANT CHANGE UP
EOSINOPHIL # BLD AUTO: 0.39 K/UL — SIGNIFICANT CHANGE UP (ref 0–0.5)
EOSINOPHIL NFR BLD AUTO: 5.2 % — SIGNIFICANT CHANGE UP (ref 0–6)
GLUCOSE SERPL-MCNC: 130 MG/DL — HIGH (ref 70–99)
GLUCOSE UR QL: NEGATIVE MG/DL — SIGNIFICANT CHANGE UP
HCT VFR BLD CALC: 42.2 % — SIGNIFICANT CHANGE UP (ref 39–50)
HGB BLD-MCNC: 13.1 G/DL — SIGNIFICANT CHANGE UP (ref 13–17)
IMM GRANULOCYTES NFR BLD AUTO: 0.8 % — SIGNIFICANT CHANGE UP (ref 0–0.9)
KETONES UR-MCNC: ABNORMAL MG/DL
LEUKOCYTE ESTERASE UR-ACNC: NEGATIVE — SIGNIFICANT CHANGE UP
LYMPHOCYTES # BLD AUTO: 2.82 K/UL — SIGNIFICANT CHANGE UP (ref 1–3.3)
LYMPHOCYTES # BLD AUTO: 37.3 % — SIGNIFICANT CHANGE UP (ref 13–44)
MCHC RBC-ENTMCNC: 24.2 PG — LOW (ref 27–34)
MCHC RBC-ENTMCNC: 31 GM/DL — LOW (ref 32–36)
MCV RBC AUTO: 77.9 FL — LOW (ref 80–100)
MONOCYTES # BLD AUTO: 0.54 K/UL — SIGNIFICANT CHANGE UP (ref 0–0.9)
MONOCYTES NFR BLD AUTO: 7.1 % — SIGNIFICANT CHANGE UP (ref 2–14)
NEUTROPHILS # BLD AUTO: 3.73 K/UL — SIGNIFICANT CHANGE UP (ref 1.8–7.4)
NEUTROPHILS NFR BLD AUTO: 49.2 % — SIGNIFICANT CHANGE UP (ref 43–77)
NITRITE UR-MCNC: NEGATIVE — SIGNIFICANT CHANGE UP
NRBC # BLD: 0 /100 WBCS — SIGNIFICANT CHANGE UP (ref 0–0)
PH UR: 5.5 — SIGNIFICANT CHANGE UP (ref 5–8)
PLATELET # BLD AUTO: 234 K/UL — SIGNIFICANT CHANGE UP (ref 150–400)
POTASSIUM SERPL-MCNC: 4.1 MMOL/L — SIGNIFICANT CHANGE UP (ref 3.5–5.3)
POTASSIUM SERPL-SCNC: 4.1 MMOL/L — SIGNIFICANT CHANGE UP (ref 3.5–5.3)
PROT SERPL-MCNC: 7 G/DL — SIGNIFICANT CHANGE UP (ref 6–8.3)
PROT UR-MCNC: NEGATIVE MG/DL — SIGNIFICANT CHANGE UP
RBC # BLD: 5.42 M/UL — SIGNIFICANT CHANGE UP (ref 4.2–5.8)
RBC # FLD: 14.7 % — HIGH (ref 10.3–14.5)
SODIUM SERPL-SCNC: 138 MMOL/L — SIGNIFICANT CHANGE UP (ref 135–145)
SP GR SPEC: 1.02 — SIGNIFICANT CHANGE UP (ref 1–1.03)
TROPONIN I, HIGH SENSITIVITY RESULT: 3.5 NG/L — SIGNIFICANT CHANGE UP
UROBILINOGEN FLD QL: 1 MG/DL — SIGNIFICANT CHANGE UP (ref 0.2–1)
WBC # BLD: 7.57 K/UL — SIGNIFICANT CHANGE UP (ref 3.8–10.5)
WBC # FLD AUTO: 7.57 K/UL — SIGNIFICANT CHANGE UP (ref 3.8–10.5)

## 2024-08-18 PROCEDURE — 82553 CREATINE MB FRACTION: CPT

## 2024-08-18 PROCEDURE — 84484 ASSAY OF TROPONIN QUANT: CPT

## 2024-08-18 PROCEDURE — 93005 ELECTROCARDIOGRAM TRACING: CPT

## 2024-08-18 PROCEDURE — 71045 X-RAY EXAM CHEST 1 VIEW: CPT

## 2024-08-18 PROCEDURE — 71045 X-RAY EXAM CHEST 1 VIEW: CPT | Mod: 26

## 2024-08-18 PROCEDURE — 80053 COMPREHEN METABOLIC PANEL: CPT

## 2024-08-18 PROCEDURE — 85025 COMPLETE CBC W/AUTO DIFF WBC: CPT

## 2024-08-18 PROCEDURE — 99285 EMERGENCY DEPT VISIT HI MDM: CPT

## 2024-08-18 PROCEDURE — 36415 COLL VENOUS BLD VENIPUNCTURE: CPT

## 2024-08-18 PROCEDURE — 93010 ELECTROCARDIOGRAM REPORT: CPT

## 2024-08-18 PROCEDURE — 81003 URINALYSIS AUTO W/O SCOPE: CPT

## 2024-08-18 NOTE — ED ADULT NURSE NOTE - NSFALLUNIVINTERV_ED_ALL_ED
Bed/Stretcher in lowest position, wheels locked, appropriate side rails in place/Call bell, personal items and telephone in reach/Instruct patient to call for assistance before getting out of bed/chair/stretcher/Non-slip footwear applied when patient is off stretcher/[place] to call system/Physically safe environment - no spills, clutter or unnecessary equipment/Purposeful proactive rounding/Room/bathroom lighting operational, light cord in reach

## 2024-08-18 NOTE — ED PROVIDER NOTE - ATTENDING APP SHARED VISIT CONTRIBUTION OF CARE
I, Enrique Silva, DO personally saw the patient with MASHA.  I have personally performed a face to face diagnostic evaluation on this patient.  I have reviewed the MASHA note and agree with the history, exam, and plan of care, except as noted.  I personally saw the patient and performed a substantive portion of the visit including all aspects of the medical decision making.

## 2024-08-18 NOTE — ED PROVIDER NOTE - CLINICAL SUMMARY MEDICAL DECISION MAKING FREE TEXT BOX
Adult male occasionally smokes vape intellectual disability and autism presents to the ER with palpitations and shortness of breath for 1 day.  Also hematuria.  Well-appearing on exam.  No distress.  Normal sinus rhythm on the monitor with no arrhythmias or ectopy.  Normal heart rate.  Lungs and heart are clear abdomen is benign.  No suprapubic tenderness palpation.  Plan twelve-lead troponin labs electrolytes check UA anticipate discharge with outpatient follow-up.

## 2024-08-18 NOTE — ED PROVIDER NOTE - PATIENT PORTAL LINK FT
You can access the FollowMyHealth Patient Portal offered by [facility] by registering at the following website: [email or web address]. By joining [facility]’s FollowMyHealth portal, you will also be able to view your health information using other applications (apps) compatible with our system.

## 2024-08-18 NOTE — ED PROVIDER NOTE - OBJECTIVE STATEMENT
34-year-old male history of autism on the spectrum intellectual disability, presents the ED complaining of palpitations.  It has been there for day.  No chest pain or shortness of breath.  He is also endorsing hematuria.  No other complaint such as fevers chills cough.  No nausea vomiting.  Good appetite.  No abdominal pain.  Of note  reports that patient has history of claiming to have medical complaints and and attention seeking fashion.

## 2024-08-18 NOTE — ED PROVIDER NOTE - NSFOLLOWUPINSTRUCTIONS_ED_ALL_ED_FT
Heart Palpitations    WHAT YOU NEED TO KNOW:  Heart palpitations are feelings that your heart races, jumps, throbs, or flutters. You may feel extra beats, no beats for a short time, or skipped beats. You may have these feelings in your chest, throat, or neck. They may happen when you are sitting, standing, or lying. Heart palpitations may be frightening, but are usually not caused by a serious problem.   DISCHARGE INSTRUCTIONS:  Call 911 or have someone else call for any of the following:   •You have any of the following signs of a heart attack: ?Squeezing, pressure, or pain in your chest  ?You may also have any of the following: ?Discomfort or pain in your back, neck, jaw, stomach, or arm  ?Shortness of breath  ?Nausea or vomiting  ?Lightheadedness or a sudden cold sweat  •You have any of the following signs of a stroke: ?Numbness or drooping on one side of your face   ?Weakness in an arm or leg  ?Confusion or difficulty speaking  ?Dizziness, a severe headache, or vision loss  •You faint or lose consciousness.     Seek care immediately if:   •Your palpitations happen more often or last longer than usual.   •You have palpitations and shortness of breath, nausea, sweating, or dizziness.     Contact your healthcare provider if:   •You have questions or concerns about your condition or care.  Follow up with your healthcare provider as directed: You may need to follow up with a cardiologist. You may need tests to check for heart problems that cause palpitations. Write down your questions so you remember to ask them during your visits.   Keep a record: Write down when your palpitations start and stop, what you were doing when they started, and your symptoms. Keep track of what you ate or drank within a few hours of your palpitations. Include anything that seemed to help your symptoms, such as lying down or holding your breath. This record will help you and your healthcare provider learn what triggers your palpitations. Bring this record with you to your follow up visits.  Help prevent heart palpitations:   •Manage stress and anxiety. Find ways to relax such as listening to music, meditating, or doing yoga. Exercise can also help decrease stress and anxiety. Talk to someone you trust about your stress or anxiety. You can also talk to a therapist.   •Get plenty of sleep every night. Ask your healthcare provider how much sleep you need each night.   •Do not drink caffeine or alcohol. Caffeine and alcohol can make your palpitations worse. Caffeine is found in soda, coffee, tea, chocolate, and drinks that increase your energy.   •Do not smoke. Nicotine and other chemicals in cigarettes and cigars may damage your heart and blood vessels. Ask your healthcare provider for information if you currently smoke and need help to quit. E-cigarettes or smokeless tobacco still contain nicotine. Talk to your healthcare provider before you use these products.   •Do not use illegal drugs. Talk to your healthcare provider if you use illegal drugs and want help to quit.     Palpitaciones cardíacas  LO QUE NECESITA SABER:  Las palpitaciones cardíacas son sensaciones que se perciben tracy aceleraciones, pallavi, latidos o aleteos del corazón. También podría sentir latidos adicionales, que hayward corazón no late por un corto periodo de tiempo o que se saltean los latidos. Puede percibir estas sensaciones en el pecho, la garganta o el meena. Pueden presentarse cuando está sentado, de pie o acostado. Las palpitaciones cardíacas pueden causar temor; sin embargo, generalmente no se deben a un problema importante.  INSTRUCCIONES SOBRE EL CHUY HOSPITALARIA:  Llame al 911 o pídale a alguien más que llame en cualquiera de los siguientes casos:  •Tiene alguno de los siguientes signos de un ataque cardíaco: ?Estrujamiento, presión o tensión en hayward pecho  ?Usted también podría presentar alguno de los siguientes: ?Malestar o dolor en hayward espalda, meena, mandíbula, abdomen, o brazo  ?Falta de aliento  ?Náuseas o vómitos  ?Desvanecimiento o sudor frío repentino    •Usted tiene alguno de los siguientes signos de derrame cerebral: ?Adormecimiento o caída de un lado de hayward ray  ?Debilidad en un brazo o jas pierna  ?Confusión o debilidad para hablar  ?Mareos o dolor de adelia intenso, o pérdida de la visión.  •Usted se desmaya o pierde el conocimiento.  Busque atención médica de inmediato si:  •Howard palpitaciones ocurren con más frecuencia o belcher más de lo habitual.  •Tiene palpitaciones y le falta el aliento, tiene sudoración, náuseas o mareos.  Comuníquese con hayward médico si:  •Usted tiene preguntas o inquietudes acerca de hayward condición o cuidado.  Acuda a howard consultas de control con hayward médico según le indicaron.Es posible que necesite un seguimiento con un cardiólogo. Sergio vez deba hacerse exámenes para determinar si sufre problemas cardíacos que le causan las palpitaciones. Anote howard preguntas para que se acuerde de hacerlas servando howard visitas.  Mantenga un registro:Escriba cuándo howard palpitaciones comienzan y terminan, qué estaba usted haciendo cuando comenzaron y howard síntomas. Mantenga un registro de lo que usted comió o tomó servando las horas antes de howard palpitaciones. Incluya todo lo que aparentemente le ayudó a que howard síntomas mejoraran tracy acostarse o contener hayward respiración. Jayna registro le ayudará a usted y a hayward médico para saber qué provoca howard palpitaciones. Lleve el registro con usted a howard citas de seguimiento.  Cómo ayudar a prevenir las palpitaciones cardíacas:  •Controlar el estrés y la ansiedad.Encuentre formas de relajarse, tracy escuchar música, meditar o hacer yoga. El ejercicio también puede ayudar a disminuir el estrés y la ansiedad. Hablar con alguien de confianza acerca de hayward estrés o ansiedad. También puede hablar con un psicoterapeuta.  •Duerma lo suficiente cada la noche.Pregunte a hayward médico cuánto debería dormir usted cada noche.  •No tome bebidas con cafeína o alcohol.La cafeína y el alcohol pueden hacer que howard palpitaciones empeoren. La cafeína se encuentra en refrescos, café, té, chocolate y bebidas que aumentan hayward energía.  •No fume.La nicotina y otros químicos en los cigarrillos y cigarros podrían provocar daño a hayward corazón y a howard vasos sanguíneos. Pida información a hayward médico si usted actualmente fuma y necesita ayuda para dejar de fumar. Los cigarrillos electrónicos o el tabaco sin humo igualmente contienen nicotina. Consulte con hayward médico antes de utilizar estos productos.  •No use drogas ilegales.Hable con hayward médico si consume drogas ilegales y quiere dejarlas. Follow up with pcp  return to er for any worsening symptoms     Heart Palpitations    WHAT YOU NEED TO KNOW:  Heart palpitations are feelings that your heart races, jumps, throbs, or flutters. You may feel extra beats, no beats for a short time, or skipped beats. You may have these feelings in your chest, throat, or neck. They may happen when you are sitting, standing, or lying. Heart palpitations may be frightening, but are usually not caused by a serious problem.   DISCHARGE INSTRUCTIONS:  Call 911 or have someone else call for any of the following:   •You have any of the following signs of a heart attack: ?Squeezing, pressure, or pain in your chest  ?You may also have any of the following: ?Discomfort or pain in your back, neck, jaw, stomach, or arm  ?Shortness of breath  ?Nausea or vomiting  ?Lightheadedness or a sudden cold sweat  •You have any of the following signs of a stroke: ?Numbness or drooping on one side of your face   ?Weakness in an arm or leg  ?Confusion or difficulty speaking  ?Dizziness, a severe headache, or vision loss  •You faint or lose consciousness.     Seek care immediately if:   •Your palpitations happen more often or last longer than usual.   •You have palpitations and shortness of breath, nausea, sweating, or dizziness.     Contact your healthcare provider if:   •You have questions or concerns about your condition or care.  Follow up with your healthcare provider as directed: You may need to follow up with a cardiologist. You may need tests to check for heart problems that cause palpitations. Write down your questions so you remember to ask them during your visits.   Keep a record: Write down when your palpitations start and stop, what you were doing when they started, and your symptoms. Keep track of what you ate or drank within a few hours of your palpitations. Include anything that seemed to help your symptoms, such as lying down or holding your breath. This record will help you and your healthcare provider learn what triggers your palpitations. Bring this record with you to your follow up visits.  Help prevent heart palpitations:   •Manage stress and anxiety. Find ways to relax such as listening to music, meditating, or doing yoga. Exercise can also help decrease stress and anxiety. Talk to someone you trust about your stress or anxiety. You can also talk to a therapist.   •Get plenty of sleep every night. Ask your healthcare provider how much sleep you need each night.   •Do not drink caffeine or alcohol. Caffeine and alcohol can make your palpitations worse. Caffeine is found in soda, coffee, tea, chocolate, and drinks that increase your energy.   •Do not smoke. Nicotine and other chemicals in cigarettes and cigars may damage your heart and blood vessels. Ask your healthcare provider for information if you currently smoke and need help to quit. E-cigarettes or smokeless tobacco still contain nicotine. Talk to your healthcare provider before you use these products.   •Do not use illegal drugs. Talk to your healthcare provider if you use illegal drugs and want help to quit.     Palpitaciones cardíacas  LO QUE NECESITA SABER:  Las palpitaciones cardíacas son sensaciones que se perciben tracy aceleraciones, pallavi, latidos o aleteos del corazón. También podría sentir latidos adicionales, que hayward corazón no late por un corto periodo de tiempo o que se saltean los latidos. Puede percibir estas sensaciones en el pecho, la garganta o el meena. Pueden presentarse cuando está sentado, de pie o acostado. Las palpitaciones cardíacas pueden causar temor; sin embargo, generalmente no se deben a un problema importante.  INSTRUCCIONES SOBRE EL CHUY HOSPITALARIA:  Llame al 911 o pídale a alguien más que llame en cualquiera de los siguientes casos:  •Tiene alguno de los siguientes signos de un ataque cardíaco: ?Estrujamiento, presión o tensión en hayward pecho  ?Usted también podría presentar alguno de los siguientes: ?Malestar o dolor en hayward espalda, meena, mandíbula, abdomen, o brazo  ?Falta de aliento  ?Náuseas o vómitos  ?Desvanecimiento o sudor frío repentino    •Usted tiene alguno de los siguientes signos de derrame cerebral: ?Adormecimiento o caída de un lado de hayward ray  ?Debilidad en un brazo o jas pierna  ?Confusión o debilidad para hablar  ?Mareos o dolor de adelia intenso, o pérdida de la visión.  •Usted se desmaya o pierde el conocimiento.  Busque atención médica de inmediato si:  •Howard palpitaciones ocurren con más frecuencia o belcher más de lo habitual.  •Tiene palpitaciones y le falta el aliento, tiene sudoración, náuseas o mareos.  Comuníquese con hayward médico si:  •Usted tiene preguntas o inquietudes acerca de hayward condición o cuidado.  Acuda a howard consultas de control con hayward médico según le indicaron.Es posible que necesite un seguimiento con un cardiólogo. Sergio vez deba hacerse exámenes para determinar si sufre problemas cardíacos que le causan las palpitaciones. Anote howard preguntas para que se acuerde de hacerlas servando howard visitas.  Mantenga un registro:Escriba cuándo howard palpitaciones comienzan y terminan, qué estaba usted haciendo cuando comenzaron y howard síntomas. Mantenga un registro de lo que usted comió o tomó servando las horas antes de howard palpitaciones. Incluya todo lo que aparentemente le ayudó a que howard síntomas mejoraran tracy acostarse o contener hayward respiración. Jayna registro le ayudará a usted y a hayward médico para saber qué provoca howard palpitaciones. Lleve el registro con usted a howard citas de seguimiento.  Cómo ayudar a prevenir las palpitaciones cardíacas:  •Controlar el estrés y la ansiedad.Encuentre formas de relajarse, tracy escuchar música, meditar o hacer yoga. El ejercicio también puede ayudar a disminuir el estrés y la ansiedad. Hablar con alguien de confianza acerca de hayward estrés o ansiedad. También puede hablar con un psicoterapeuta.  •Duerma lo suficiente cada la noche.Pregunte a hayward médico cuánto debería dormir usted cada noche.  •No tome bebidas con cafeína o alcohol.La cafeína y el alcohol pueden hacer que howard palpitaciones empeoren. La cafeína se encuentra en refrescos, café, té, chocolate y bebidas que aumentan hayward energía.  •No fume.La nicotina y otros químicos en los cigarrillos y cigarros podrían provocar daño a hayward corazón y a howard vasos sanguíneos. Pida información a hayward médico si usted actualmente fuma y necesita ayuda para dejar de fumar. Los cigarrillos electrónicos o el tabaco sin humo igualmente contienen nicotina. Consulte con hayward médico antes de utilizar estos productos.  •No use drogas ilegales.Hable con hayward médico si consume drogas ilegales y quiere dejarlas.

## 2024-08-18 NOTE — ED ADULT NURSE NOTE - OBJECTIVE STATEMENT
Pt received in 15A, aide from group home is present at bedside. Pt is a&ox4, complaining of palpitations since last night. Pt denies pain at this time, reports that this has been happening intermittently. EMS that brought patient reports that the home said the pt went to [facility] at [place] twice this week, but that they didn't medically clear him so he is requesting medical evaluation. Pt also endorsing having a uti but is on antibiotics - reports that he has had blood in his urine, urine is dark but yellow. Urine sent per orders. 20G IV placed in R wrist, labs drawn and sent per order. Pt is well appearing, ambulatory self at baseline, he is aware of the plan. Awaiting results at this time. Will maintain safety and continue to monitor.

## 2024-08-19 NOTE — BH INPATIENT PSYCHIATRY ASSESSMENT NOTE - NSBHMSEIMPULSE_PSY_A_CORE
Received paperwork back from [employer] support. Scanned into media.     To BN: Both ICD-10 codes are not valid. Please advise if another ICD-10 code can be provided. ~thank you   
Normal

## 2024-08-23 NOTE — ED BEHAVIORAL HEALTH ASSESSMENT NOTE - NS ED BHA AXIS I SECONDARY1 CODE FT
Pt is asking for a call back from the nurse. He has some questions about when he needs his next appt and should it be in cardiology or at the CHF clinic.   F84.0

## 2024-08-27 ENCOUNTER — APPOINTMENT (OUTPATIENT)
Dept: ORTHOPEDIC SURGERY | Facility: CLINIC | Age: 34
End: 2024-08-27
Payer: MEDICAID

## 2024-08-27 VITALS
HEART RATE: 75 BPM | HEIGHT: 70 IN | SYSTOLIC BLOOD PRESSURE: 98 MMHG | WEIGHT: 294 LBS | BODY MASS INDEX: 42.09 KG/M2 | DIASTOLIC BLOOD PRESSURE: 75 MMHG

## 2024-08-27 DIAGNOSIS — S63.501A UNSPECIFIED SPRAIN OF RIGHT WRIST, INITIAL ENCOUNTER: ICD-10-CM

## 2024-08-27 PROBLEM — F84.0 AUTISTIC DISORDER: Chronic | Status: ACTIVE | Noted: 2024-08-22

## 2024-08-27 PROCEDURE — 99213 OFFICE O/P EST LOW 20 MIN: CPT

## 2024-08-27 NOTE — ED ADULT NURSE NOTE - NS ED NURSE LEVEL OF CONSCIOUSNESS ORIENTATION
1. The severity of signs/symptoms.(See ED/admit documents)
Oriented - self; Oriented - place; Oriented - time

## 2024-08-28 ENCOUNTER — APPOINTMENT (OUTPATIENT)
Dept: HEMATOLOGY ONCOLOGY | Facility: CLINIC | Age: 34
End: 2024-08-28
Payer: MEDICAID

## 2024-08-28 ENCOUNTER — RESULT REVIEW (OUTPATIENT)
Age: 34
End: 2024-08-28

## 2024-08-28 VITALS
TEMPERATURE: 97.2 F | DIASTOLIC BLOOD PRESSURE: 85 MMHG | WEIGHT: 301.15 LBS | BODY MASS INDEX: 43.21 KG/M2 | RESPIRATION RATE: 16 BRPM | OXYGEN SATURATION: 94 % | HEART RATE: 73 BPM | SYSTOLIC BLOOD PRESSURE: 127 MMHG

## 2024-08-28 DIAGNOSIS — C62.12 MALIGNANT NEOPLASM OF DESCENDED LEFT TESTIS: ICD-10-CM

## 2024-08-28 DIAGNOSIS — C62.91 MALIGNANT NEOPLASM OF RIGHT TESTIS, UNSPECIFIED WHETHER DESCENDED OR UNDESCENDED: ICD-10-CM

## 2024-08-28 DIAGNOSIS — N50.82 SCROTAL PAIN: ICD-10-CM

## 2024-08-28 LAB
BASOPHILS # BLD AUTO: 0.03 K/UL — SIGNIFICANT CHANGE UP (ref 0–0.2)
BASOPHILS NFR BLD AUTO: 0.5 % — SIGNIFICANT CHANGE UP (ref 0–2)
EOSINOPHIL # BLD AUTO: 0.31 K/UL — SIGNIFICANT CHANGE UP (ref 0–0.5)
EOSINOPHIL NFR BLD AUTO: 5.2 % — SIGNIFICANT CHANGE UP (ref 0–6)
HCT VFR BLD CALC: 42.8 % — SIGNIFICANT CHANGE UP (ref 39–50)
HGB BLD-MCNC: 13.4 G/DL — SIGNIFICANT CHANGE UP (ref 13–17)
IMM GRANULOCYTES NFR BLD AUTO: 1.2 % — HIGH (ref 0–0.9)
LYMPHOCYTES # BLD AUTO: 2.19 K/UL — SIGNIFICANT CHANGE UP (ref 1–3.3)
LYMPHOCYTES # BLD AUTO: 36.6 % — SIGNIFICANT CHANGE UP (ref 13–44)
MCHC RBC-ENTMCNC: 24.4 PG — LOW (ref 27–34)
MCHC RBC-ENTMCNC: 31.3 G/DL — LOW (ref 32–36)
MCV RBC AUTO: 77.8 FL — LOW (ref 80–100)
MONOCYTES # BLD AUTO: 0.41 K/UL — SIGNIFICANT CHANGE UP (ref 0–0.9)
MONOCYTES NFR BLD AUTO: 6.9 % — SIGNIFICANT CHANGE UP (ref 2–14)
NEUTROPHILS # BLD AUTO: 2.97 K/UL — SIGNIFICANT CHANGE UP (ref 1.8–7.4)
NEUTROPHILS NFR BLD AUTO: 49.6 % — SIGNIFICANT CHANGE UP (ref 43–77)
NRBC # BLD: 0 /100 WBCS — SIGNIFICANT CHANGE UP (ref 0–0)
PLATELET # BLD AUTO: 214 K/UL — SIGNIFICANT CHANGE UP (ref 150–400)
RBC # BLD: 5.5 M/UL — SIGNIFICANT CHANGE UP (ref 4.2–5.8)
RBC # FLD: 14.8 % — HIGH (ref 10.3–14.5)
WBC # BLD: 5.98 K/UL — SIGNIFICANT CHANGE UP (ref 3.8–10.5)
WBC # FLD AUTO: 5.98 K/UL — SIGNIFICANT CHANGE UP (ref 3.8–10.5)

## 2024-08-28 PROCEDURE — G2211 COMPLEX E/M VISIT ADD ON: CPT | Mod: NC

## 2024-08-28 PROCEDURE — 99214 OFFICE O/P EST MOD 30 MIN: CPT

## 2024-08-28 RX ORDER — TAMSULOSIN HYDROCHLORIDE 0.4 MG/1
0.4 CAPSULE ORAL
Qty: 30 | Refills: 2 | Status: ACTIVE | COMMUNITY
Start: 2024-08-28

## 2024-08-28 RX ORDER — TESTOSTERONE 50 MG/5G
50 MG/5GM GEL TRANSDERMAL
Refills: 0 | Status: ACTIVE | COMMUNITY
Start: 2024-08-28

## 2024-08-28 NOTE — REVIEW OF SYSTEMS
[Diarrhea: Grade 0] : Diarrhea: Grade 0 [Dizziness] : dizziness [Fever] : no fever [Chills] : no chills [Night Sweats] : no night sweats [Fatigue] : no fatigue [Chest Pain] : no chest pain [Palpitations] : no palpitations [Lower Ext Edema] : no lower extremity edema [Shortness Of Breath] : no shortness of breath [Cough] : no cough [Abdominal Pain] : no abdominal pain [Vomiting] : no vomiting [Constipation] : no constipation [Dysuria] : no dysuria [Incontinence] : no incontinence [Joint Pain] : no joint pain [Joint Stiffness] : no joint stiffness [Muscle Pain] : no muscle pain [Muscle Weakness] : no muscle weakness [Easy Bleeding] : no tendency for easy bleeding [Easy Bruising] : no tendency for easy bruising

## 2024-08-28 NOTE — ADDENDUM
[FreeTextEntry1] : This note was written by Nadya Birch on 08/28/2024 acting as scribe for Lisset Rowe OTR/MEIR, PA.

## 2024-08-28 NOTE — PHYSICAL EXAM
[Capable of only limited self care, confined to bed or chair more than 50% of waking hours] : Status 3- Capable of only limited self care, confined to bed or chair more than 50% of waking hours [Normal] : affect appropriate [de-identified] : anicteric  [de-identified] : no LE edema  [de-identified] : CHRISTIANO Fernandes chaperoned exam: s/p bilateral orchiectomy, no abnormal findings in the scrotum
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
## 2024-08-28 NOTE — DISCUSSION/SUMMARY
[de-identified] : The patient presents with a right wrist sprain.  The patient was splinted.  He was advised to keep the splint on, no lifting, pulling or pushing anything heavy, and return to the office in a period of three weeks.

## 2024-08-28 NOTE — HISTORY OF PRESENT ILLNESS
[7] : a current pain level of 7/10 [de-identified] : Markus comes in today with complaints of right wrist pain.  He states that he was punching something.  He has a history of autism.  He resides at a home.  The patient states the pain is constant.  The patient states moving makes the symptoms worse.

## 2024-08-28 NOTE — PHYSICAL EXAM
[Capable of only limited self care, confined to bed or chair more than 50% of waking hours] : Status 3- Capable of only limited self care, confined to bed or chair more than 50% of waking hours [Normal] : affect appropriate [de-identified] : anicteric  [de-identified] : no LE edema  [de-identified] : CHRISTIANO Fernandes chaperoned exam: s/p bilateral orchiectomy, no abnormal findings in the scrotum

## 2024-08-28 NOTE — PHYSICAL EXAM
[Capable of only limited self care, confined to bed or chair more than 50% of waking hours] : Status 3- Capable of only limited self care, confined to bed or chair more than 50% of waking hours [Normal] : affect appropriate [de-identified] : anicteric  [de-identified] : no LE edema  [de-identified] : CHRISTIANO Fernandes chaperoned exam: s/p bilateral orchiectomy, no abnormal findings in the scrotum

## 2024-08-28 NOTE — ASSESSMENT
[Future Reassessment of Pain Scale] : Future reassessment of pain scale    [FreeTextEntry1] : 34 year old male with history of autism who has stage Ia seminoma. Initial med onc consultation on 10/7/2022: He has stage Ia seminoma. Although his only LDH was 308, hCG and AFP were normal. LDH is nonspecific. NCCN also mentioned decisions regarding treatment should not be made on mildly elevated less than 3 upper limited of normal LDH alone. His LDH will be followed. He has stage Ia seminoma. The recurrence in 5 years is about 10% to 15%. If he is treated with radiation vs one or two doses of carboplatin auc 7, overtreatment is 85% to 90%. He will be under surveillance given salvage treatment about 100%. He will have follow up in one month for LDH level, then every 3 months in the first year, every 6 months in the year 2 and 3, annually in the year 4 and 5. Surveillance images every 6 months in the first two years, annually in the year 3, 4 and 5. His LDH on Oct 27 was 325, however, hCG and AFP wnl. Andrews 3, . Jan 30, 2023 CT abd and pelvis showed showed KAIN. s/p right orchiectomy, T1a on 11/15/23.  Plan - s/p left orchiectomy, 2022  - s/p right orchiectomy, T1a on 11/15/23  - 4/19/24 CT AP: No evidence of retrocrural or retroperitoneal adenopathy. - Reports scrotal pain since his orchiectomy in 11/2023 however pain has worsened over the past month, worse with sitting or lying down in the fetal position. He went to [place] [facility] ED earlier this week for pain and reportedly had CT scans done. 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  - I have asked our  to obtain the discharge note, CT reports, and CT images on disc so the scans may be uploaded to our system and compared to his April 2024 imaging. His May 2024 AFP and HCG were normal, LDH has been slightly elevated for the past 2+ years and overall stable. Next imaging was due in October however we will await the reports from [place] [facility] first before deciding whether additional imaging is needed at this time.  - In the meantime, pt was instructed to f/u with urology asap for further eval of persistent scrotal pains.   Hypogonadism 2/2 b/l orchiectiomy  - Continue testosterone as per urology   Pt's Mother Liz: [phone] Dr. Paul, Urologist: [phone]  Instructed to contact our office with any new/worsening symptoms. Pt and group home staff regarding plan of care, all questions/concerns addressed to the best of my abilities and their apparent satisfaction. F/u in 2mo.

## 2024-08-28 NOTE — ASSESSMENT
[Future Reassessment of Pain Scale] : Future reassessment of pain scale    [FreeTextEntry1] : 34 year old male with history of autism who has stage Ia seminoma. Initial med onc consultation on 10/7/2022: He has stage Ia seminoma. Although his only LDH was 308, hCG and AFP were normal. LDH is nonspecific. NCCN also mentioned decisions regarding treatment should not be made on mildly elevated less than 3 upper limited of normal LDH alone. His LDH will be followed. He has stage Ia seminoma. The recurrence in 5 years is about 10% to 15%. If he is treated with radiation vs one or two doses of carboplatin auc 7, overtreatment is 85% to 90%. He will be under surveillance given salvage treatment about 100%. He will have follow up in one month for LDH level, then every 3 months in the first year, every 6 months in the year 2 and 3, annually in the year 4 and 5. Surveillance images every 6 months in the first two years, annually in the year 3, 4 and 5. His LDH on Oct 27 was 325, however, hCG and AFP wnl. Andrews 3, . Jan 30, 2023 CT abd and pelvis showed showed KAIN. s/p right orchiectomy, T1a on 11/15/23.  Plan - s/p left orchiectomy, 2022  - s/p right orchiectomy, T1a on 11/15/23  - 4/19/24 CT AP: No evidence of retrocrural or retroperitoneal adenopathy. - Reports scrotal pain since his orchiectomy in 11/2023 however pain has worsened over the past month, worse with sitting or lying down in the fetal position. He went to [place] ED earlier this week for pain and reportedly had CT scans done. 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  - I have asked our  to obtain the discharge note, CT reports, and CT images on disc so the scans may be uploaded to our system and compared to his April 2024 imaging. His May 2024 AFP and HCG were normal, LDH has been slightly elevated for the past 2+ years and overall stable. Next imaging was due in October however we will await the reports from [place] first before deciding whether additional imaging is needed at this time.  - In the meantime, pt was instructed to f/u with urology asap for further eval of persistent scrotal pains.   Hypogonadism 2/2 b/l orchiectiomy  - Continue testosterone as per urology   Pt's Mother Liz: [phone] Dr. Paul, Urologist: [phone]  Instructed to contact our office with any new/worsening symptoms. Pt and group home staff regarding plan of care, all questions/concerns addressed to the best of my abilities and their apparent satisfaction. F/u in 2mo.

## 2024-08-28 NOTE — ASSESSMENT
[Future Reassessment of Pain Scale] : Future reassessment of pain scale    [FreeTextEntry1] : 34 year old male with history of autism who has stage Ia seminoma. Initial med onc consultation on 10/7/2022: He has stage Ia seminoma. Although his only LDH was 308, hCG and AFP were normal. LDH is nonspecific. NCCN also mentioned decisions regarding treatment should not be made on mildly elevated less than 3 upper limited of normal LDH alone. His LDH will be followed. He has stage Ia seminoma. The recurrence in 5 years is about 10% to 15%. If he is treated with radiation vs one or two doses of carboplatin auc 7, overtreatment is 85% to 90%. He will be under surveillance given salvage treatment about 100%. He will have follow up in one month for LDH level, then every 3 months in the first year, every 6 months in the year 2 and 3, annually in the year 4 and 5. Surveillance images every 6 months in the first two years, annually in the year 3, 4 and 5. His LDH on Oct 27 was 325, however, hCG and AFP wnl. Andrews 3, . Jan 30, 2023 CT abd and pelvis showed showed KAIN. s/p right orchiectomy, T1a on 11/15/23.  Plan - s/p left orchiectomy, 2022  - s/p right orchiectomy, T1a on 11/15/23  - 4/19/24 CT AP: No evidence of retrocrural or retroperitoneal adenopathy. - Reports scrotal pain since his orchiectomy in 11/2023 however pain has worsened over the past month, worse with sitting or lying down in the fetal position. He went to [place] ED earlier this week for pain and reportedly had CT scans done. 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  - I have asked our  to obtain the discharge note, CT reports, and CT images on disc so the scans may be uploaded to our system and compared to his April 2024 imaging. His May 2024 AFP and HCG were normal, LDH has been slightly elevated for the past 2+ years and overall stable. Next imaging was due in October however we will await the reports from [place] first before deciding whether additional imaging is needed at this time.  - In the meantime, pt was instructed to f/u with urology asap for further eval of persistent scrotal pains.   Hypogonadism 2/2 b/l orchiectiomy  - Continue testosterone as per urology   Pt's Mother Liz: [phone] Dr. Paul, Urologist: [phone]  Instructed to contact our office with any new/worsening symptoms. Pt and group home staff regarding plan of care, all questions/concerns addressed to the best of my abilities and their apparent satisfaction. F/u in 2mo.
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
## 2024-08-28 NOTE — PHYSICAL EXAM
[de-identified] : Right wrist: Wrist: Range of Motion in Degrees: 	                                                Claimant:	                Normal:	 Dorsiflexion: (Active)               	90-degrees	90-degrees	 Dorsiflexion: (Passive)	                90-degrees	90-degrees	 Palmar flexion: (Active)              	90-degrees	90-degrees	 Palmar flexion: (Passive)              	90-degrees	90-degrees	 Radial & ulnar deviation(Active)	30-degrees	30-degrees	 Radial & ulnar deviation(Passive)	30-degrees	30-degrees	 Pronation/Supination:(Active)    	0-180 degrees	0-180 degrees	 Pronation/Supination:(Passive)          	0-180 degrees	0-180 degrees	  No instability.  There is tenderness over the radial and ulnar collateral ligaments.  Negative Tinel's.  Negative Phalen's.   No tenderness at the TFCC.  No tenderness with ulnar deviation.  No tenderness of the first dorsal compartment.  Negative Finkelstein's.  No tenderness at the level of the basal joint.  Negative grind.  No muscular atrophy.  No tenderness with flexion and extension of the wrist.   No motor or sensory deficits.   2+ radial and ulnar pulses.  Skin is intact.  No rashes, scars or lesions.        Left wrist: Wrist: Range of Motion in Degrees: 	                                                Claimant:	                Normal:	 Dorsiflexion: (Active)               	90-degrees	90-degrees	 Dorsiflexion: (Passive)	                90-degrees	90-degrees	 Palmar flexion: (Active)              	90-degrees	90-degrees	 Palmar flexion: (Passive)              	90-degrees	90-degrees	 Radial & ulnar deviation(Active)	30-degrees	30-degrees	 Radial & ulnar deviation(Passive)	30-degrees	30-degrees	 Pronation/Supination:(Active)    	0-180 degrees	0-180 degrees	 Pronation/Supination:(Passive)          	0-180 degrees	0-180 degrees	  No instability.  No volar, radial or ulnar tenderness.  No dorsal, radial or ulnar tenderness.  Negative Tinels.  Negative Phalens.   No tenderness at the TFCC.  No tenderness with ulnar deviation.  No tenderness of the first dorsal compartment.  Negative Finkelsteins.  No tenderness at the level of the basal joint.  Negative grind.  No muscular atrophy.  No tenderness with flexion and extension of the wrist.  No motor or sensory deficits.  2+ radial and ulnar pulses.  Skin is intact.  No rashes, scars or lesions.    [de-identified] : Gait: Normal    Station: Normal  [de-identified] : Appearance: Well-developed, well-nourished male  in no acute distress.  [de-identified] : Review of outside radiographs reveals an old fracture of the distal radius, but he does not have any new acute fractures or dislocations.

## 2024-08-29 ENCOUNTER — EMERGENCY (EMERGENCY)
Facility: HOSPITAL | Age: 34
LOS: 1 days | Discharge: HOME | End: 2024-08-29
Attending: STUDENT IN AN ORGANIZED HEALTH CARE EDUCATION/TRAINING PROGRAM | Admitting: STUDENT IN AN ORGANIZED HEALTH CARE EDUCATION/TRAINING PROGRAM
Payer: MEDICAID

## 2024-08-29 VITALS
RESPIRATION RATE: 18 BRPM | OXYGEN SATURATION: 95 % | SYSTOLIC BLOOD PRESSURE: 120 MMHG | HEART RATE: 93 BPM | WEIGHT: 300.93 LBS | TEMPERATURE: 98 F | DIASTOLIC BLOOD PRESSURE: 91 MMHG | HEIGHT: 69 IN

## 2024-08-29 VITALS
RESPIRATION RATE: 18 BRPM | OXYGEN SATURATION: 96 % | HEART RATE: 88 BPM | SYSTOLIC BLOOD PRESSURE: 122 MMHG | TEMPERATURE: 98 F | DIASTOLIC BLOOD PRESSURE: 84 MMHG

## 2024-08-29 DIAGNOSIS — Z90.79 ACQUIRED ABSENCE OF OTHER GENITAL ORGAN(S): Chronic | ICD-10-CM

## 2024-08-29 LAB
AFP-TM SERPL-MCNC: 4 NG/ML
ALBUMIN SERPL ELPH-MCNC: 4.2 G/DL
ALP BLD-CCNC: 91 U/L
ALT SERPL-CCNC: 30 U/L
ANION GAP SERPL CALC-SCNC: 16 MMOL/L
AST SERPL-CCNC: 21 U/L
BILIRUB SERPL-MCNC: 0.2 MG/DL
BUN SERPL-MCNC: 13 MG/DL
CALCIUM SERPL-MCNC: 9.3 MG/DL
CHLORIDE SERPL-SCNC: 101 MMOL/L
CO2 SERPL-SCNC: 23 MMOL/L
CREAT SERPL-MCNC: 0.82 MG/DL
EGFR: 117 ML/MIN/1.73M2
GLUCOSE SERPL-MCNC: 195 MG/DL
HCG-TM SERPL-MCNC: <1 MIU/ML
LDH SERPL-CCNC: 262 U/L
POTASSIUM SERPL-SCNC: 4.8 MMOL/L
PROT SERPL-MCNC: 6.3 G/DL
SODIUM SERPL-SCNC: 141 MMOL/L
TESTOST SERPL-MCNC: 74 NG/DL

## 2024-08-29 PROCEDURE — 99282 EMERGENCY DEPT VISIT SF MDM: CPT

## 2024-08-29 PROCEDURE — 99283 EMERGENCY DEPT VISIT LOW MDM: CPT

## 2024-08-29 RX ORDER — MAGNESIUM, ALUMINUM HYDROXIDE 200-200 MG
30 TABLET,CHEWABLE ORAL ONCE
Refills: 0 | Status: COMPLETED | OUTPATIENT
Start: 2024-08-29 | End: 2024-08-29

## 2024-08-29 RX ADMIN — Medication 30 MILLILITER(S): at 02:27

## 2024-08-29 RX ADMIN — Medication 20 MILLIGRAM(S): at 02:26

## 2024-08-30 ENCOUNTER — EMERGENCY (EMERGENCY)
Facility: HOSPITAL | Age: 34
LOS: 1 days | Discharge: HOME | End: 2024-08-30
Attending: EMERGENCY MEDICINE | Admitting: EMERGENCY MEDICINE
Payer: MEDICAID

## 2024-08-30 VITALS
HEIGHT: 69 IN | OXYGEN SATURATION: 95 % | WEIGHT: 296.96 LBS | DIASTOLIC BLOOD PRESSURE: 88 MMHG | TEMPERATURE: 98 F | RESPIRATION RATE: 18 BRPM | HEART RATE: 86 BPM | SYSTOLIC BLOOD PRESSURE: 125 MMHG

## 2024-08-30 DIAGNOSIS — Z90.79 ACQUIRED ABSENCE OF OTHER GENITAL ORGAN(S): Chronic | ICD-10-CM

## 2024-08-30 PROCEDURE — 73130 X-RAY EXAM OF HAND: CPT | Mod: 26,LT

## 2024-08-30 PROCEDURE — 93010 ELECTROCARDIOGRAM REPORT: CPT

## 2024-08-30 PROCEDURE — 99285 EMERGENCY DEPT VISIT HI MDM: CPT

## 2024-08-30 RX ORDER — ACETAMINOPHEN 325 MG/1
650 TABLET ORAL ONCE
Refills: 0 | Status: COMPLETED | OUTPATIENT
Start: 2024-08-30 | End: 2024-08-30

## 2024-08-30 RX ADMIN — ACETAMINOPHEN 650 MILLIGRAM(S): 325 TABLET ORAL at 23:16
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
## 2024-08-30 NOTE — ED PROVIDER NOTE - CLINICAL SUMMARY MEDICAL DECISION MAKING FREE TEXT BOX
35 yo M with medical hx as mentioned above brought in for psych evaluation for self injurious behavior and suicidal ideation. Plan to get labs, EKG, L hand XR to r/o fracture, Telepsych consult, maintain 1:1 observation.

## 2024-08-30 NOTE — ED PROVIDER NOTE - NSICDXPASTMEDICALHX_GEN_ALL_CORE_FT
PAST MEDICAL HISTORY:  Asthma     Autism     Autism     Enuresis     Factitious disorder     GERD (gastroesophageal reflux disease)     History of BPH     HLD (hyperlipidemia)     Hypothyroid     Impulse control disorder     Intellectual disability     Myopia of both eyes     Obesity     Testicular cancer     Type 2 diabetes mellitus     Urinary retention      Has The Growth Been Previously Biopsied?: has been previously biopsied

## 2024-08-30 NOTE — ED PROVIDER NOTE - OBJECTIVE STATEMENT
33 yo M with hx of DM, HTN, BPH, Autism, ADHD (clonidine), Depression (mirtazipine), Bipolar (Depakote), Agitation (risperdal) BIB by staff from group home with reports of self-injurious behavior. Pt repeatedly punching walls at the group home. Pt states he is here for "SI". To me he states he does not have a plan. States he has had prior attempts and states he's tried to stab himself. Staff reported pt broke plastic and said he was going to cut his throat. Pt c/o L hand pain but appears to have full range of motion.

## 2024-08-30 NOTE — ED PROVIDER NOTE - PATIENT PORTAL LINK FT
You can access the FollowMyHealth Patient Portal offered by [facility] by registering at the following website: [email or web address]. By joining [employer]’s FollowMyHealth portal, you will also be able to view your health information using other applications (apps) compatible with our system.

## 2024-08-30 NOTE — ED PROVIDER NOTE - TOBACCO USE
Patient Education    BRIGHT FUTURES HANDOUT- PARENT  4 MONTH VISIT  Here are some suggestions from [employer] experts that may be of value to your family.     HOW YOUR FAMILY IS DOING  Learn if your home or drinking water has lead and take steps to get rid of it. Lead is toxic for everyone.  Take time for yourself and with your partner. Spend time with family and friends.  Choose a mature, trained, and responsible  or caregiver.  You can talk with us about your  choices.    FEEDING YOUR BABY  For babies at 4 months of age, breast milk or iron-fortified formula remains the best food. Solid foods are discouraged until about 6 months of age.  Avoid feeding your baby too much by following the baby s signs of fullness, such as  Leaning back  Turning away  If Breastfeeding  Providing only breast milk for your baby for about the first 6 months after birth provides ideal nutrition. It supports the best possible growth and development.  Be proud of yourself if you are still breastfeeding. Continue as long as you and your baby want.  Know that babies this age go through growth spurts. They may want to breastfeed more often and that is normal.  If you pump, be sure to store your milk properly so it stays safe for your baby. We can give you more information.  Give your baby vitamin D drops (400 IU a day).  Tell us if you are taking any medications, supplements, or herbal preparations.  If Formula Feeding  Make sure to prepare, heat, and store the formula safely.  Feed on demand. Expect him to eat about 30 to 32 oz daily.  Hold your baby so you can look at each other when you feed him.  Always hold the bottle. Never prop it.  Don t give your baby a bottle while he is in a crib.    YOUR CHANGING BABY  Create routines for feeding, nap time, and bedtime.  Calm your baby with soothing and gentle touches when she is fussy.  Make time for quiet play.  Hold your baby and talk with her.  Read to your baby  often.  Encourage active play.  Offer floor gyms and colorful toys to hold.  Put your baby on her tummy for playtime. Don t leave her alone during tummy time or allow her to sleep on her tummy.  Don t have a TV on in the background or use a TV or other digital media to calm your baby.    HEALTHY TEETH  Go to your own dentist twice yearly. It is important to keep your teeth healthy so you don t pass bacteria that cause cavities on to your baby.  Don t share spoons with your baby or use your mouth to clean the baby s pacifier.  Use a cold teething ring if your baby s gums are sore from teething.  Don t put your baby in a crib with a bottle.  Clean your baby s gums and teeth (as soon as you see the first tooth) 2 times per day with a soft cloth or soft toothbrush and a small smear of fluoride toothpaste (no more than a grain of rice).    SAFETY  Use a rear-facing-only car safety seat in the back seat of all vehicles.  Never put your baby in the front seat of a vehicle that has a passenger airbag.  Your baby s safety depends on you. Always wear your lap and shoulder seat belt. Never drive after drinking alcohol or using drugs. Never text or use a cell phone while driving.  Always put your baby to sleep on her back in her own crib, not in your bed.  Your baby should sleep in your room until she is at least 6 months of age.  Make sure your baby s crib or sleep surface meets the most recent safety guidelines.  Don t put soft objects and loose bedding such as blankets, pillows, bumper pads, and toys in the crib.  Drop-side cribs should not be used.  Lower the crib mattress.  If you choose to use a mesh playpen, get one made after February 28, 2013.  Prevent tap water burns. Set the water heater so the temperature at the faucet is at or below 120 F /49 C.  Prevent scalds or burns. Don t drink hot drinks when holding your baby.  Keep a hand on your baby on any surface from which she might fall and get hurt, such as a changing  table, couch, or bed.  Never leave your baby alone in bathwater, even in a bath seat or ring.  Keep small objects, small toys, and latex balloons away from your baby.  Don t use a baby walker.    WHAT TO EXPECT AT YOUR BABY S 6 MONTH VISIT  We will talk about  Caring for your baby, your family, and yourself  Teaching and playing with your baby  Brushing your baby s teeth  Introducing solid food  Keeping your baby safe at home, outside, and in the car        Helpful Resources:  Information About Car Safety Seats: www.safercar.gov/parents  Toll-free Auto Safety Hotline: 511.333.8926  Consistent with Bright Futures: Guidelines for Health Supervision of Infants, Children, and Adolescents, 4th Edition  For more information, go to https://brightfutures.aap.org.             Learning About Safe Sleep for Babies  Following safe sleep guidelines can help prevent sudden infant death syndrome (SIDS). SIDS is the death of a baby younger than 1 year with no known cause. Talk about safe sleep with anyone who spends time with your baby. Explain in detail what you expect the person to do.    Always put your baby to sleep on their back.   Place your baby on a firm, flat surface to sleep. The safest place for a baby is in a crib, cradle, or bassinet that meets safety standards.     Put your baby to sleep alone in the crib.   Keep soft items (like blankets, stuffed animals, and pillows) and loose bedding out of the crib. They could block your baby's mouth or trap your baby.     Don't use sleep positioners, bumper pads, or other products that attach to the crib. They could block your baby's mouth or trap your baby.   Do not place your baby in a car seat, sling, swing, bouncer, or stroller to sleep.     Have your baby sleep in the same room as you (in their own separate sleep space) for at least the first 6 months--and for the first year, if you can.   Keep the room at a comfortable temperature so that your baby can sleep in lightweight  "clothes without a blanket.   Follow-up care is a key part of your child's treatment and safety. Be sure to make and go to all appointments, and call your doctor if your child is having problems. It's also a good idea to know your child's test results and keep a list of the medicines your child takes.  Where can you learn more?  Go to https://www.[employer].net/patiented  Enter E820 in the search box to learn more about \"Learning About Safe Sleep for Babies.\"  Current as of: March 1, 2023               Content Version: 13.7    4196-0689 [employer].   Care instructions adapted under license by your healthcare professional. If you have questions about a medical condition or this instruction, always ask your healthcare professional. [employer] disclaims any warranty or liability for your use of this information.      Why Your Baby Needs Tummy Time  Experts advise that parents place babies on their backs for sleeping. This reduces sudden infant death syndrome (SIDS). But to develop motor skills, it is important for your baby to spend time on his or her tummy as well.   During waking hours, tummy time will help your baby develop neck, arm and trunk muscles. These muscles help your baby turn her or his head, reach, roll, sit and crawl.   How do I give my baby tummy time?  Some babies may not like to lie on their tummies at first. With help, your baby will begin to enjoy tummy time. Give your baby tummy time for a few minutes, four times per day.   Always be there to watch your child. As your child gets older and stronger, give more tummy time with less support.  Place your baby on your chest while you are lying on your back or sitting back. Place your baby's arms under the baby's chest and urge him or her to look at you.  Put a towel roll under your baby's chest with the arms in front. Help your baby push into the floor.  Place your hand on your baby's bottom to get him or her to lift the " head.  Lay your baby over your leg and urge her or him to reach for a toy.  Carry your baby with the tummy toward the floor. Urge your baby to look up and around at things in the room.       What happens when a baby lies only on his or her back?   If babies always lie on their backs, they can develop problems. If they tend to turn their heads to the same side, their heads may become flat (plagiocephaly). Or the neck muscles may become tight on one side (torticollis). This could lead to problems with:  Using both sides of the body  Looking to one side  Reaching with one arm  Balancing  Learning how to roll, sit or walk at the same time as other children of the same age.  How do I reduce the risk of these problems?  Tummy time will help prevent these problems. Here are some other things you can do.  Vary which end of the bed you place your baby's head. This will get her or him to turn the head to both sides.  Regularly change the side where you place toys for your baby. This will get him or her to turn the head to both the right and left sides.  Change sides during each feeding (breast or bottle).     Change your baby's position while she or he is awake. Place your child on the floor lying on the back, stomach or side (place child on both sides).  Limit your baby's time in car seats, swings, bouncy seats and exercise saucers. These tend to press on the back of the head.  How can I help my baby develop motor skills?  As often as you can, hold your baby or watch him or her play on the floor. If you give your baby chances to move, he or she should develop the skills listed below. This is a general guide. A baby with normal development may learn some skills earlier or later.  A  will make faces when seeing, hearing, touching or tasting something. When placed on the tummy, a  can lift his or her head high enough to breathe.  A 1-month-old can reach either hand to the mouth. When placed on the tummy, he or she  can turn the head to both sides.  A 2-month-old can push up on the elbows and lift her or his head to look at a toy.  A 3-month-old can lift the head and chest from the floor and begin to roll.  A 5-ai-1-month-old can hold arms and legs off the floor when lying on the back. On the tummy, the baby can straighten the arms and support her or his weight through the hands.  A 6-month-old can roll over to the right or left. He or she is starting to sit up without support.  If you have any concerns, please call your baby's doctor or physical therapist.   Therapist: _____________________________  Phone: _______________________________  For more info, go to: [email or web address]  For informational purposes only. Not to replace the advice of your health care provider. opyright   2006 [facility]. All rights reserved. Clinically reviewed by Florecita Mayes MA, OTR/L. [employer] [id number] - REV 01/21.    Give Cricket 10 mcg of vitamin D every day to help with healthy bone growth.       Unknown if ever smoked

## 2024-08-30 NOTE — ED PROVIDER NOTE - NSFOLLOWUPINSTRUCTIONS_ED_ALL_ED_FT
Follow up with your psychiatry appointment as scheduled for next week    Mood Disorders    WHAT YOU NEED TO KNOW:    What is a mood disorder? A mood disorder is a medical condition that makes it hard for you to control your mood or emotions. Your mood can affect your personality, behavior, and outlook on life. A mood disorder is also called an affective disorder.    What increases my risk for a mood disorder?    A major change in your life    Trauma or stress    Chemical changes in your body    Certain medicines such as hormones or steroids    A family history of mood disorder    Alcohol or substance use disorder  What are the signs and symptoms of a mood disorder?    Changes in your eating habits, energy level, weight, or sleeping patterns    Low self-esteem    Feeling hopeless, anxious, or sad    Loss of interest in daily or enjoyable activities    Irritability or frequent mood swings    Low sex drive    Trouble concentrating or making decisions  How is a mood disorder diagnosed? Your healthcare provider will ask about your medical and social history. He or she will ask if you have ever wanted to hurt yourself or others. Tell him or her if you have people in your life who support you. Tell him or her about your behaviors, feelings, and relationships with others. Your answers will help determine which treatment is best for you.    How is a mood disorder treated? Treatment will depend on the cause of your mood disorder and how severe your symptoms are. You may need any of the following:    Medicines can help control your moods.    Talk therapy can help identify stressors in your life and how to deal with them. Talk therapy can be with a therapist, counselor, or psychiatrist. Sessions may be one-on-one or with family.  How can I manage my symptoms?    Try to get 6 to 8 hours of sleep each night. Contact your healthcare provider if you have trouble sleeping.    Manage stress. Learn new ways to relax, such as deep breathing or meditation.    Talk to someone about how you feel. Join a support group. Talk to your healthcare provider, family, or friends about your feelings.    Exercise regularly. Ask about the best exercise plan for you. Most healthcare providers recommend 30 minutes each day, 5 days a week. Exercise helps to lower stress and manage moods.  [employer] Walking for Exercise  Call your local emergency number (911 in the [place]) if:    You feel like you want to hurt yourself or others.    When should I call my doctor?    You feel more depressed or anxious than usual.    Your medicine causes you to feel drowsy, keeps you awake, or affects how much you eat.    You have questions or concerns about your condition or care.  CARE AGREEMENT:    You have the right to help plan your care. Learn about your health condition and how it may be treated. Discuss treatment options with your healthcare providers to decide what care you want to receive. You always have the right to refuse treatment.

## 2024-08-31 VITALS
TEMPERATURE: 98 F | RESPIRATION RATE: 18 BRPM | OXYGEN SATURATION: 94 % | DIASTOLIC BLOOD PRESSURE: 85 MMHG | SYSTOLIC BLOOD PRESSURE: 131 MMHG | HEART RATE: 85 BPM

## 2024-08-31 DIAGNOSIS — F79 UNSPECIFIED INTELLECTUAL DISABILITIES: ICD-10-CM

## 2024-08-31 DIAGNOSIS — F84.0 AUTISTIC DISORDER: ICD-10-CM

## 2024-08-31 DIAGNOSIS — F63.9 IMPULSE DISORDER, UNSPECIFIED: ICD-10-CM

## 2024-08-31 LAB
ALBUMIN SERPL ELPH-MCNC: 3.3 G/DL — SIGNIFICANT CHANGE UP (ref 3.3–5)
ALP SERPL-CCNC: 85 U/L — SIGNIFICANT CHANGE UP (ref 40–120)
ALT FLD-CCNC: 39 U/L — SIGNIFICANT CHANGE UP (ref 12–78)
ANION GAP SERPL CALC-SCNC: 5 MMOL/L — SIGNIFICANT CHANGE UP (ref 5–17)
APAP SERPL-MCNC: <2 UG/ML — LOW (ref 10–30)
AST SERPL-CCNC: 20 U/L — SIGNIFICANT CHANGE UP (ref 15–37)
BASOPHILS # BLD AUTO: 0.03 K/UL — SIGNIFICANT CHANGE UP (ref 0–0.2)
BASOPHILS NFR BLD AUTO: 0.4 % — SIGNIFICANT CHANGE UP (ref 0–2)
BILIRUB SERPL-MCNC: 0.3 MG/DL — SIGNIFICANT CHANGE UP (ref 0.2–1.2)
BUN SERPL-MCNC: 12 MG/DL — SIGNIFICANT CHANGE UP (ref 7–23)
CALCIUM SERPL-MCNC: 9.6 MG/DL — SIGNIFICANT CHANGE UP (ref 8.5–10.1)
CHLORIDE SERPL-SCNC: 105 MMOL/L — SIGNIFICANT CHANGE UP (ref 96–108)
CO2 SERPL-SCNC: 28 MMOL/L — SIGNIFICANT CHANGE UP (ref 22–31)
CREAT SERPL-MCNC: 0.84 MG/DL — SIGNIFICANT CHANGE UP (ref 0.5–1.3)
EGFR: 117 ML/MIN/1.73M2 — SIGNIFICANT CHANGE UP
EOSINOPHIL # BLD AUTO: 0.13 K/UL — SIGNIFICANT CHANGE UP (ref 0–0.5)
EOSINOPHIL NFR BLD AUTO: 1.7 % — SIGNIFICANT CHANGE UP (ref 0–6)
ETHANOL SERPL-MCNC: 13 MG/DL — HIGH (ref 0–10)
GLUCOSE SERPL-MCNC: 176 MG/DL — HIGH (ref 70–99)
HCT VFR BLD CALC: 40.3 % — SIGNIFICANT CHANGE UP (ref 39–50)
HGB BLD-MCNC: 12.4 G/DL — LOW (ref 13–17)
IMM GRANULOCYTES NFR BLD AUTO: 0.6 % — SIGNIFICANT CHANGE UP (ref 0–0.9)
LYMPHOCYTES # BLD AUTO: 2.34 K/UL — SIGNIFICANT CHANGE UP (ref 1–3.3)
LYMPHOCYTES # BLD AUTO: 29.9 % — SIGNIFICANT CHANGE UP (ref 13–44)
MCHC RBC-ENTMCNC: 23.9 PG — LOW (ref 27–34)
MCHC RBC-ENTMCNC: 30.8 GM/DL — LOW (ref 32–36)
MCV RBC AUTO: 77.8 FL — LOW (ref 80–100)
MONOCYTES # BLD AUTO: 0.88 K/UL — SIGNIFICANT CHANGE UP (ref 0–0.9)
MONOCYTES NFR BLD AUTO: 11.3 % — SIGNIFICANT CHANGE UP (ref 2–14)
NEUTROPHILS # BLD AUTO: 4.39 K/UL — SIGNIFICANT CHANGE UP (ref 1.8–7.4)
NEUTROPHILS NFR BLD AUTO: 56.1 % — SIGNIFICANT CHANGE UP (ref 43–77)
NRBC # BLD: 0 /100 WBCS — SIGNIFICANT CHANGE UP (ref 0–0)
PLATELET # BLD AUTO: 220 K/UL — SIGNIFICANT CHANGE UP (ref 150–400)
POTASSIUM SERPL-MCNC: 4.6 MMOL/L — SIGNIFICANT CHANGE UP (ref 3.5–5.3)
POTASSIUM SERPL-SCNC: 4.6 MMOL/L — SIGNIFICANT CHANGE UP (ref 3.5–5.3)
PROT SERPL-MCNC: 6.8 G/DL — SIGNIFICANT CHANGE UP (ref 6–8.3)
RBC # BLD: 5.18 M/UL — SIGNIFICANT CHANGE UP (ref 4.2–5.8)
RBC # FLD: 15 % — HIGH (ref 10.3–14.5)
SALICYLATES SERPL-MCNC: <1.7 MG/DL — LOW (ref 2.8–20)
SARS-COV-2 RNA SPEC QL NAA+PROBE: SIGNIFICANT CHANGE UP
SODIUM SERPL-SCNC: 138 MMOL/L — SIGNIFICANT CHANGE UP (ref 135–145)
WBC # BLD: 7.82 K/UL — SIGNIFICANT CHANGE UP (ref 3.8–10.5)
WBC # FLD AUTO: 7.82 K/UL — SIGNIFICANT CHANGE UP (ref 3.8–10.5)

## 2024-08-31 PROCEDURE — 93005 ELECTROCARDIOGRAM TRACING: CPT

## 2024-08-31 PROCEDURE — 80307 DRUG TEST PRSMV CHEM ANLYZR: CPT

## 2024-08-31 PROCEDURE — 85025 COMPLETE CBC W/AUTO DIFF WBC: CPT

## 2024-08-31 PROCEDURE — 73130 X-RAY EXAM OF HAND: CPT

## 2024-08-31 PROCEDURE — 99285 EMERGENCY DEPT VISIT HI MDM: CPT | Mod: 25

## 2024-08-31 PROCEDURE — 87635 SARS-COV-2 COVID-19 AMP PRB: CPT

## 2024-08-31 PROCEDURE — 80053 COMPREHEN METABOLIC PANEL: CPT

## 2024-08-31 PROCEDURE — 90792 PSYCH DIAG EVAL W/MED SRVCS: CPT | Mod: 95

## 2024-08-31 PROCEDURE — 36415 COLL VENOUS BLD VENIPUNCTURE: CPT

## 2024-08-31 NOTE — ED BEHAVIORAL HEALTH ASSESSMENT NOTE - CURRENT MEDICATION
Buspirone 15 mg nightly  VPA  mg at noon  VPA  mg 2 tabs nightly  Mirtazapine 15 mg 1 tab nightly  Risperidone 4 mg bid  Clonidine 0.2 mg TID

## 2024-08-31 NOTE — ED BEHAVIORAL HEALTH ASSESSMENT NOTE - RISK ASSESSMENT
Risk: prior admissions, self harming behaviors, intellectual disability  Protective: secure housing, compliance with treatment, help-seeking behavior

## 2024-08-31 NOTE — ED ADULT NURSE NOTE - NSICDXPASTMEDICALHX_GEN_ALL_CORE_FT
PAST MEDICAL HISTORY:  Asthma     Autism     Autism     Enuresis     Factitious disorder     GERD (gastroesophageal reflux disease)     History of BPH     HLD (hyperlipidemia)     Hypothyroid     Impulse control disorder     Intellectual disability     Myopia of both eyes     Obesity     Testicular cancer     Type 2 diabetes mellitus     Urinary retention

## 2024-08-31 NOTE — ED BEHAVIORAL HEALTH ASSESSMENT NOTE - DIFFERENTIAL
autism spectrum disorder, moderate Intellectual disability; other diagnoses includes bipolar disorder, impulse control disorder, HAVEN, mood disorder NOS, PTSD, ADHD and factitious disorder

## 2024-08-31 NOTE — ED ADULT NURSE NOTE - ED STAT RN HANDOFF DETAILS
Patient and report received at 0720, EMS arrived shortly thereafter to take patient back to group home.  When report was received, I spoke to Dr. Le who stated patient no longer needed 1:1 because he was cleared for discharge.

## 2024-08-31 NOTE — ED BEHAVIORAL HEALTH ASSESSMENT NOTE - SUMMARY
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 with multiple past inpatient psychiatric admissions (as per [employer] staff, most recently 4/2024, hx of cutting himself, charted longstanding hx of endorsing SI (situational: mostly stemming from discontent of living environment) as well as hx of aggression toward group home staff ; BIBEMS self-activated for SI.     The patient presents with chronic suicidal ideation and plan in the context of attention seeking behavior. He has a history of constantly calling EMS for either somatic complaints or psychiatric complaints of AH or SI. He currently lives in a group home with 1:1 staff, protocols in place to ensure patient’s safety. His group home staff members are well aware of his desire for constant medical attention and are equipped to manage his chronic suicidality. Given that his clinical presentation of pleasant and jovial demeanor does not match reported suicidality as well as the safeguards that are in place, the patient would not benefit from inpatient psychiatric admission as this would likely not change outcome and only reinforce behavior in seeking unnecessary treatment. The patient is best served in ongoing treatment in his group home. There are no psychiatric barriers to discharge.     Plan:   -- treat and release   -- patient has outpatient psychiatrist appointment on 6/7  -- continue home medications

## 2024-08-31 NOTE — ED BEHAVIORAL HEALTH ASSESSMENT NOTE - OTHER PAST PSYCHIATRIC HISTORY (INCLUDE DETAILS REGARDING ONSET, COURSE OF ILLNESS, INPATIENT/OUTPATIENT TREATMENT)
Inpatient: last in 4/2024 for self harm   Outpatient: Dr. Duffy, psychiatrist ([phone]), with upcoming appointment on 6/7

## 2024-08-31 NOTE — ED BEHAVIORAL HEALTH ASSESSMENT NOTE - DESCRIPTION
asthma, DM, BPH, urinary retention, GERD, hypothyroidism, HLD, HTN, and myopia The patient is calm, cooperative, not needing PRNs. lives at [facility]

## 2024-08-31 NOTE — ED BEHAVIORAL HEALTH ASSESSMENT NOTE - PAST PSYCHOTROPIC MEDICATION
as per chart review - multiple meds trial to include Zyprexa,  Haldol, Clonidine, Topamax, Klonopin, Seroquel, Thorazine, Abilify, Trazodone, Gabapentin, Tegretol, Trileptal, Perphenazine, Prozac, Lithium, Zoloft, Guanfacine

## 2024-08-31 NOTE — ED ADULT NURSE NOTE - CHIEF COMPLAINT QUOTE
From group home in [place]  with staff, pt reports suicidal ideation with a plan to cut his neck with a broken piece of plastic. Hx SI in the past and self-harm tendencies. Reports aggression, punching walls and banging head against wall at group home. Called 911 multiple times at group home. Reports hearing voices "sometimes".

## 2024-08-31 NOTE — ED ADULT NURSE NOTE - NSFALLUNIVINTERV_ED_ALL_ED
Bed/Stretcher in lowest position, wheels locked, appropriate side rails in place/Call bell, personal items and telephone in reach/Instruct patient to call for assistance before getting out of bed/chair/stretcher/Non-slip footwear applied when patient is off stretcher/[place] to call system/Physically safe environment - no spills, clutter or unnecessary equipment/Purposeful proactive rounding/Room/bathroom lighting operational, light cord in reach

## 2024-08-31 NOTE — ED BEHAVIORAL HEALTH ASSESSMENT NOTE - HPI (INCLUDE ILLNESS QUALITY, SEVERITY, DURATION, TIMING, CONTEXT, MODIFYING FACTORS, ASSOCIATED SIGNS AND SYMPTOMS)
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 with multiple past inpatient psychiatric admissions (as per [employer] staff, most recently 4/2024, hx of cutting himself, charted longstanding hx of endorsing SI (situational: mostly stemming from discontent of living environment) as well as hx of aggression toward group home staff ; BIBEMS self-activated for SI.     The patient presents concrete, calm, cooperative, smiling with hospital staff, answering questions appropriately.     The patient states that he is having ongoing command hallucinations to harm himself that has been for about a month. He says that he has thoughts of taking plastic to cut himself and has done numerous things in the past. He says that he doesn’t think his medications are working though he is compliant with all of them. He says that he enjoys his group home in general and that he gets along with the staff members, feels safe, enjoys his roommate. He denies HI.     He gave consent to speak to [facility] staff.     Collateral from Mae, staff member at [facility], [phone]:     She was told that the patient called 911 more than 10 times and something the staff learned is that he’ll call the police multiple times to get the attention of going to the hospital. The patient has been in the group home for about a month. He does fine and has paranoia at baseline where he’ll say something small like his stomach hurts and will self-diagnose and would immediately want to go to the hospital. The group home tries to redirect him. He has a 1:1 at the group [place] due to self-harming.      Collateral from Darryl, supervisor at [facility], [phone]:     The patient had a doctor’s appointment and was mad about it and when he went home, he got upset with staff and called 911 and saying he was going to commit suicide. He started punching the walls so that he could hurt himself to go to the hospital. He comes to the hospital 3-4 times a week. He takes his medications. She knows that he will come up with anything to go to the hospital because he enjoys the attention he gets. She doesn’t feel that he is an acute danger to himself and has a 1:1. There are no barriers to him returning to the group home. He has an appointment with the psychiatrist on 6/7.

## 2024-08-31 NOTE — ED BEHAVIORAL HEALTH ASSESSMENT NOTE - DETAILS
spoke to [facility] supervisor patient refusing to cooperate due to seeking admission patient expressing chronic SI with current plan to use sharp plastic to cut neck reports CAH to harm self (chronic) Per chart has had a rash in response to Zyprexa and reports dystonic reaction to haldol

## 2024-08-31 NOTE — ED ADULT NURSE REASSESSMENT NOTE - DESCRIPTION
Pt resting comfortably in bed, rails up and wheels locked in place. States he does have suicide ideation, wants to stab himself with something sharp. 1:1 continued.

## 2024-08-31 NOTE — ED ADULT NURSE NOTE - OBJECTIVE STATEMENT
received pt from group home for suicidal and agression pt in Ed pleasant and cooperative pt evaluated ekg done and reviewed awaiting blood result pt placed on one to one observation as per protocol

## 2024-09-06 ENCOUNTER — NON-APPOINTMENT (OUTPATIENT)
Age: 34
End: 2024-09-06

## 2024-09-08 ENCOUNTER — EMERGENCY (EMERGENCY)
Facility: HOSPITAL | Age: 34
LOS: 1 days | Discharge: HOME | End: 2024-09-10
Attending: EMERGENCY MEDICINE
Payer: MEDICAID

## 2024-09-08 VITALS
HEART RATE: 88 BPM | OXYGEN SATURATION: 97 % | RESPIRATION RATE: 18 BRPM | TEMPERATURE: 98 F | DIASTOLIC BLOOD PRESSURE: 80 MMHG | WEIGHT: 299.83 LBS | SYSTOLIC BLOOD PRESSURE: 127 MMHG | HEIGHT: 69 IN

## 2024-09-08 DIAGNOSIS — Z88.8 ALLERGY STATUS TO OTHER DRUGS, MEDICAMENTS AND BIOLOGICAL SUBSTANCES: ICD-10-CM

## 2024-09-08 DIAGNOSIS — F68.10 FACTITIOUS DISORDER IMPOSED ON SELF, UNSPECIFIED: ICD-10-CM

## 2024-09-08 DIAGNOSIS — F84.0 AUTISTIC DISORDER: ICD-10-CM

## 2024-09-08 DIAGNOSIS — F90.9 ATTENTION-DEFICIT HYPERACTIVITY DISORDER, UNSPECIFIED TYPE: ICD-10-CM

## 2024-09-08 DIAGNOSIS — R00.2 PALPITATIONS: ICD-10-CM

## 2024-09-08 DIAGNOSIS — R07.89 OTHER CHEST PAIN: ICD-10-CM

## 2024-09-08 DIAGNOSIS — F63.9 IMPULSE DISORDER, UNSPECIFIED: ICD-10-CM

## 2024-09-08 DIAGNOSIS — F25.1 SCHIZOAFFECTIVE DISORDER, DEPRESSIVE TYPE: ICD-10-CM

## 2024-09-08 DIAGNOSIS — F43.10 POST-TRAUMATIC STRESS DISORDER, UNSPECIFIED: ICD-10-CM

## 2024-09-08 DIAGNOSIS — Z90.79 ACQUIRED ABSENCE OF OTHER GENITAL ORGAN(S): Chronic | ICD-10-CM

## 2024-09-08 DIAGNOSIS — R10.9 UNSPECIFIED ABDOMINAL PAIN: ICD-10-CM

## 2024-09-08 LAB
ALBUMIN SERPL ELPH-MCNC: 3.3 G/DL — SIGNIFICANT CHANGE UP (ref 3.3–5)
ALP SERPL-CCNC: 89 U/L — SIGNIFICANT CHANGE UP (ref 40–120)
ALT FLD-CCNC: 43 U/L — SIGNIFICANT CHANGE UP (ref 12–78)
AMPHET UR-MCNC: NEGATIVE — SIGNIFICANT CHANGE UP
ANION GAP SERPL CALC-SCNC: 6 MMOL/L — SIGNIFICANT CHANGE UP (ref 5–17)
APAP SERPL-MCNC: <2 UG/ML — LOW (ref 10–30)
APPEARANCE UR: CLEAR — SIGNIFICANT CHANGE UP
APTT BLD: 32 SEC — SIGNIFICANT CHANGE UP (ref 24.5–35.6)
AST SERPL-CCNC: 24 U/L — SIGNIFICANT CHANGE UP (ref 15–37)
BARBITURATES UR SCN-MCNC: NEGATIVE — SIGNIFICANT CHANGE UP
BASOPHILS # BLD AUTO: 0.03 K/UL — SIGNIFICANT CHANGE UP (ref 0–0.2)
BASOPHILS NFR BLD AUTO: 0.4 % — SIGNIFICANT CHANGE UP (ref 0–2)
BENZODIAZ UR-MCNC: NEGATIVE — SIGNIFICANT CHANGE UP
BILIRUB SERPL-MCNC: 0.2 MG/DL — SIGNIFICANT CHANGE UP (ref 0.2–1.2)
BILIRUB UR-MCNC: NEGATIVE — SIGNIFICANT CHANGE UP
BLD GP AB SCN SERPL QL: SIGNIFICANT CHANGE UP
BUN SERPL-MCNC: 16 MG/DL — SIGNIFICANT CHANGE UP (ref 7–23)
CALCIUM SERPL-MCNC: 8.7 MG/DL — SIGNIFICANT CHANGE UP (ref 8.5–10.1)
CHLORIDE SERPL-SCNC: 105 MMOL/L — SIGNIFICANT CHANGE UP (ref 96–108)
CO2 SERPL-SCNC: 28 MMOL/L — SIGNIFICANT CHANGE UP (ref 22–31)
COCAINE METAB.OTHER UR-MCNC: NEGATIVE — SIGNIFICANT CHANGE UP
COLOR SPEC: YELLOW — SIGNIFICANT CHANGE UP
CREAT SERPL-MCNC: 0.96 MG/DL — SIGNIFICANT CHANGE UP (ref 0.5–1.3)
DIFF PNL FLD: NEGATIVE — SIGNIFICANT CHANGE UP
EGFR: 106 ML/MIN/1.73M2 — SIGNIFICANT CHANGE UP
EOSINOPHIL # BLD AUTO: 0.28 K/UL — SIGNIFICANT CHANGE UP (ref 0–0.5)
EOSINOPHIL NFR BLD AUTO: 4.2 % — SIGNIFICANT CHANGE UP (ref 0–6)
ETHANOL SERPL-MCNC: <10 MG/DL — SIGNIFICANT CHANGE UP (ref 0–10)
FENTANYL UR QL SCN: NEGATIVE — SIGNIFICANT CHANGE UP
FLUAV AG NPH QL: SIGNIFICANT CHANGE UP
FLUBV AG NPH QL: SIGNIFICANT CHANGE UP
GLUCOSE BLDC GLUCOMTR-MCNC: 156 MG/DL — HIGH (ref 70–99)
GLUCOSE BLDC GLUCOMTR-MCNC: 160 MG/DL — HIGH (ref 70–99)
GLUCOSE SERPL-MCNC: 165 MG/DL — HIGH (ref 70–99)
GLUCOSE UR QL: NEGATIVE MG/DL — SIGNIFICANT CHANGE UP
HCT VFR BLD CALC: 42 % — SIGNIFICANT CHANGE UP (ref 39–50)
HGB BLD-MCNC: 13 G/DL — SIGNIFICANT CHANGE UP (ref 13–17)
IMM GRANULOCYTES NFR BLD AUTO: 1.3 % — HIGH (ref 0–0.9)
INR BLD: 0.86 RATIO — SIGNIFICANT CHANGE UP (ref 0.85–1.18)
KETONES UR-MCNC: NEGATIVE MG/DL — SIGNIFICANT CHANGE UP
LEUKOCYTE ESTERASE UR-ACNC: NEGATIVE — SIGNIFICANT CHANGE UP
LYMPHOCYTES # BLD AUTO: 2.34 K/UL — SIGNIFICANT CHANGE UP (ref 1–3.3)
LYMPHOCYTES # BLD AUTO: 34.7 % — SIGNIFICANT CHANGE UP (ref 13–44)
MCHC RBC-ENTMCNC: 24.3 PG — LOW (ref 27–34)
MCHC RBC-ENTMCNC: 31 GM/DL — LOW (ref 32–36)
MCV RBC AUTO: 78.4 FL — LOW (ref 80–100)
METHADONE UR-MCNC: NEGATIVE — SIGNIFICANT CHANGE UP
MONOCYTES # BLD AUTO: 0.56 K/UL — SIGNIFICANT CHANGE UP (ref 0–0.9)
MONOCYTES NFR BLD AUTO: 8.3 % — SIGNIFICANT CHANGE UP (ref 2–14)
NEUTROPHILS # BLD AUTO: 3.44 K/UL — SIGNIFICANT CHANGE UP (ref 1.8–7.4)
NEUTROPHILS NFR BLD AUTO: 51.1 % — SIGNIFICANT CHANGE UP (ref 43–77)
NITRITE UR-MCNC: NEGATIVE — SIGNIFICANT CHANGE UP
OPIATES UR-MCNC: NEGATIVE — SIGNIFICANT CHANGE UP
PCP SPEC-MCNC: SIGNIFICANT CHANGE UP
PCP UR-MCNC: NEGATIVE — SIGNIFICANT CHANGE UP
PH UR: 7 — SIGNIFICANT CHANGE UP (ref 5–8)
PLATELET # BLD AUTO: 225 K/UL — SIGNIFICANT CHANGE UP (ref 150–400)
POTASSIUM SERPL-MCNC: 4.3 MMOL/L — SIGNIFICANT CHANGE UP (ref 3.5–5.3)
POTASSIUM SERPL-SCNC: 4.3 MMOL/L — SIGNIFICANT CHANGE UP (ref 3.5–5.3)
PROT SERPL-MCNC: 6.7 GM/DL — SIGNIFICANT CHANGE UP (ref 6–8.3)
PROT UR-MCNC: NEGATIVE MG/DL — SIGNIFICANT CHANGE UP
PROTHROM AB SERPL-ACNC: 9.8 SEC — SIGNIFICANT CHANGE UP (ref 9.5–13)
RBC # BLD: 5.36 M/UL — SIGNIFICANT CHANGE UP (ref 4.2–5.8)
RBC # FLD: 15 % — HIGH (ref 10.3–14.5)
RSV RNA NPH QL NAA+NON-PROBE: SIGNIFICANT CHANGE UP
SALICYLATES SERPL-MCNC: <1.7 MG/DL — LOW (ref 2.8–20)
SARS-COV-2 RNA SPEC QL NAA+PROBE: SIGNIFICANT CHANGE UP
SODIUM SERPL-SCNC: 139 MMOL/L — SIGNIFICANT CHANGE UP (ref 135–145)
SP GR SPEC: 1.03 — SIGNIFICANT CHANGE UP (ref 1–1.03)
THC UR QL: NEGATIVE — SIGNIFICANT CHANGE UP
TROPONIN I, HIGH SENSITIVITY RESULT: 5.08 NG/L — SIGNIFICANT CHANGE UP
UROBILINOGEN FLD QL: 1 MG/DL — SIGNIFICANT CHANGE UP (ref 0.2–1)
WBC # BLD: 6.74 K/UL — SIGNIFICANT CHANGE UP (ref 3.8–10.5)
WBC # FLD AUTO: 6.74 K/UL — SIGNIFICANT CHANGE UP (ref 3.8–10.5)

## 2024-09-08 PROCEDURE — 81003 URINALYSIS AUTO W/O SCOPE: CPT

## 2024-09-08 PROCEDURE — 86850 RBC ANTIBODY SCREEN: CPT

## 2024-09-08 PROCEDURE — 80053 COMPREHEN METABOLIC PANEL: CPT

## 2024-09-08 PROCEDURE — 99285 EMERGENCY DEPT VISIT HI MDM: CPT

## 2024-09-08 PROCEDURE — 84484 ASSAY OF TROPONIN QUANT: CPT

## 2024-09-08 PROCEDURE — 85730 THROMBOPLASTIN TIME PARTIAL: CPT

## 2024-09-08 PROCEDURE — 74177 CT ABD & PELVIS W/CONTRAST: CPT | Mod: 26,MC

## 2024-09-08 PROCEDURE — 71260 CT THORAX DX C+: CPT | Mod: MC

## 2024-09-08 PROCEDURE — 99285 EMERGENCY DEPT VISIT HI MDM: CPT | Mod: 25

## 2024-09-08 PROCEDURE — 74177 CT ABD & PELVIS W/CONTRAST: CPT | Mod: MC

## 2024-09-08 PROCEDURE — 36415 COLL VENOUS BLD VENIPUNCTURE: CPT

## 2024-09-08 PROCEDURE — 0241U: CPT

## 2024-09-08 PROCEDURE — 71260 CT THORAX DX C+: CPT | Mod: 26,MC

## 2024-09-08 PROCEDURE — 85610 PROTHROMBIN TIME: CPT

## 2024-09-08 PROCEDURE — 96372 THER/PROPH/DIAG INJ SC/IM: CPT

## 2024-09-08 PROCEDURE — 86901 BLOOD TYPING SEROLOGIC RH(D): CPT

## 2024-09-08 PROCEDURE — 70450 CT HEAD/BRAIN W/O DYE: CPT | Mod: 26,MC

## 2024-09-08 PROCEDURE — 80307 DRUG TEST PRSMV CHEM ANLYZR: CPT

## 2024-09-08 PROCEDURE — 93005 ELECTROCARDIOGRAM TRACING: CPT

## 2024-09-08 PROCEDURE — 82962 GLUCOSE BLOOD TEST: CPT

## 2024-09-08 PROCEDURE — 85025 COMPLETE CBC W/AUTO DIFF WBC: CPT

## 2024-09-08 PROCEDURE — 70450 CT HEAD/BRAIN W/O DYE: CPT | Mod: MC

## 2024-09-08 PROCEDURE — 93010 ELECTROCARDIOGRAM REPORT: CPT

## 2024-09-08 PROCEDURE — 86900 BLOOD TYPING SEROLOGIC ABO: CPT

## 2024-09-08 PROCEDURE — 80164 ASSAY DIPROPYLACETIC ACD TOT: CPT

## 2024-09-08 RX ORDER — RISPERIDONE 0.25 MG/1
4 TABLET, FILM COATED ORAL ONCE
Refills: 0 | Status: COMPLETED | OUTPATIENT
Start: 2024-09-08 | End: 2024-09-08

## 2024-09-08 RX ORDER — BUSPIRONE HYDROCHLORIDE 30 MG/1
15 TABLET ORAL
Refills: 0 | Status: COMPLETED | OUTPATIENT
Start: 2024-09-08 | End: 2024-09-09

## 2024-09-08 RX ORDER — CYCLOBENZAPRINE HCL 10 MG
5 TABLET ORAL ONCE
Refills: 0 | Status: COMPLETED | OUTPATIENT
Start: 2024-09-08 | End: 2024-09-08

## 2024-09-08 RX ORDER — CEPHALEXIN 500 MG
500 CAPSULE ORAL ONCE
Refills: 0 | Status: COMPLETED | OUTPATIENT
Start: 2024-09-08 | End: 2024-09-08

## 2024-09-08 RX ORDER — DIVALPROEX SODIUM 125 MG/1
500 CAPSULE, DELAYED RELEASE ORAL ONCE
Refills: 0 | Status: COMPLETED | OUTPATIENT
Start: 2024-09-08 | End: 2024-09-08

## 2024-09-08 RX ORDER — CEPHALEXIN 500 MG
1 CAPSULE ORAL
Qty: 10 | Refills: 0
Start: 2024-09-08 | End: 2024-09-12

## 2024-09-08 RX ORDER — MIRTAZAPINE 30 MG
15 TABLET ORAL ONCE
Refills: 0 | Status: COMPLETED | OUTPATIENT
Start: 2024-09-08 | End: 2024-09-08

## 2024-09-08 RX ORDER — DIVALPROEX SODIUM 125 MG/1
1000 CAPSULE, DELAYED RELEASE ORAL AT BEDTIME
Refills: 0 | Status: DISCONTINUED | OUTPATIENT
Start: 2024-09-08 | End: 2024-09-10

## 2024-09-08 RX ORDER — CLONIDINE HCL 0.2 MG
0.2 TABLET ORAL THREE TIMES A DAY
Refills: 0 | Status: COMPLETED | OUTPATIENT
Start: 2024-09-08 | End: 2024-09-09

## 2024-09-08 RX ORDER — SODIUM CHLORIDE 9 MG/ML
500 INJECTION INTRAMUSCULAR; INTRAVENOUS; SUBCUTANEOUS ONCE
Refills: 0 | Status: COMPLETED | OUTPATIENT
Start: 2024-09-08 | End: 2024-09-08

## 2024-09-08 RX ADMIN — SODIUM CHLORIDE 500 MILLILITER(S): 9 INJECTION INTRAMUSCULAR; INTRAVENOUS; SUBCUTANEOUS at 02:52

## 2024-09-08 RX ADMIN — DIVALPROEX SODIUM 500 MILLIGRAM(S): 125 CAPSULE, DELAYED RELEASE ORAL at 14:30

## 2024-09-08 RX ADMIN — Medication 0.2 MILLIGRAM(S): at 23:22

## 2024-09-08 RX ADMIN — Medication 5 MILLIGRAM(S): at 08:06

## 2024-09-08 RX ADMIN — Medication 15 MILLIGRAM(S): at 20:22

## 2024-09-08 RX ADMIN — Medication 0.2 MILLIGRAM(S): at 14:29

## 2024-09-08 RX ADMIN — Medication 500 MILLIGRAM(S): at 08:06

## 2024-09-08 RX ADMIN — DIVALPROEX SODIUM 1000 MILLIGRAM(S): 125 CAPSULE, DELAYED RELEASE ORAL at 20:22

## 2024-09-08 RX ADMIN — RISPERIDONE 4 MILLIGRAM(S): 0.25 TABLET, FILM COATED ORAL at 14:29

## 2024-09-08 NOTE — ED ADULT NURSE REASSESSMENT NOTE - NS ED NURSE REASSESS COMMENT FT1
Pt independently ambulated to the bathroom with a steady gait. 1:1 nursing assistant accompanied the patient. The nursing assistant discarded the urine sample so urine was unable to be sent to the lab. MD Dumont made aware. No further orders at this time.

## 2024-09-08 NOTE — ED PROVIDER NOTE - OBJECTIVE STATEMENT
34 year old male with PMH of MR, autism, here for palpitation, and diffuse aches in chest and abdomen. No trauma. No visual or focal neurological complaints. Ambulatory. Tolerating PO. 34 year old male with PMH of MR, marcelo, here for palpitation, and diffuse aches in chest and abdomen. No trauma. No visual or focal neurological complaints. Ambulatory. Tolerating PO. no fever or chills. No weakness in arms or legs. No recent trauma. No skin rash. No neck pain or stiffness. No paresthesias. States had hx of testicular dx with surgery, but in remission. No melena or hematochezia. No nausea, vomiting or diarrhea. No other concerns.

## 2024-09-08 NOTE — ED PROVIDER NOTE - PROGRESS NOTE DETAILS
Tim LEMONS: Nontoxic appearing, vital signs stable, tolerating PO, no change in behavior as per staff, groin exam with chaperone showed no evidence of any acute pathology (ANALI Hernandez chaperoned). Pt's labs and imaging reviewed. Outpatient follow up with primary and strict return precautions provided for pt. Tim LEMONS: Pt now states SI, will consult psych. so from Dr. Dumont at 0800 pt pending psych evaluation B Delia GARIBAY spoke with  psych awaiting call to attending for potential attending B Delia GARIBAY pt tba to psych, no beds, pt involuntary, spoke with Psych NP Chiquita pt is medically cleared B Delia GARIBAY Vladimir Colunga DO (Attending): Spoke with psych attending, patient on reassessment and collateral from outpatient psychiatrist patient is at baseline and no longer is endorsing SI/HI/AVH.  Patient has follow-up with psych tomorrow outpatient.  Patient is cleared for discharge from psychiatry.  He will return to the group home.

## 2024-09-08 NOTE — ED PROVIDER NOTE - NSFOLLOWUPINSTRUCTIONS_ED_ALL_ED_FT
Please note that I have provided you with the results of your labs and imaging. As discussed there is no evidence of any acute pathology on the CAT scan imaging. Please follow up with your primary care provider. Please take meds for pain (tylenol and motrin) as needed. If possible follow up with a cardiologist and a gastroenterologist.     While CAT scan or CT scan imaging is an ideal imaging in the emergent setting, at times it is not the most sensitive in diagnosis of all disease process. Please note that you have been provided written copies of the imaging and that you can access real time reports using the patient portal link. You can also have your primary care provider or your specialist, pull up the real time reports or review images. All CT type images are read by our radiology specialist and ER defers to their expertise on findings. As stated there are certain pathologies that can be missed even with the best attention to detail, due to the inherent limitation of the CAT scan imaging such as ulcers, small masses or tumors or even certain cancers. Also note that most if not all CAT scan imaging can find incidental findings. Incidental findings are reported findings that are not the main goal behind doing the CAT scan but are details that the radiologist sees that he feels the patient should know about. Generally most incidental findings are benign but NOT always. So please make sure you report all incidental findings to your primary care provider and your specialist, as they might need further imaging or testing to rule out certain conditions. You can potentially need other imaging such as MRI (magnetic resonance imaging), gastric endoscopy, colonoscopy, etc. Sometimes nothing needs to be done, but this is a decision to be made by the primary or specialist so please inform them of the findings.     For all other health concerns or if you have any emergencies please return to us immediately. Please follow-up with your psychiatrist tomorrow as scheduled.    Please return to the ER if develop thoughts of hurting yourself, others, have auditory or visual hallucinations.  Please call 911 and return to the ER.    Please note that I have provided you with the results of your labs and imaging. As discussed there is no evidence of any acute pathology on the CAT scan imaging. Please follow up with your primary care provider. Please take meds for pain (tylenol and motrin) as needed. If possible follow up with a cardiologist and a gastroenterologist.     While CAT scan or CT scan imaging is an ideal imaging in the emergent setting, at times it is not the most sensitive in diagnosis of all disease process. Please note that you have been provided written copies of the imaging and that you can access real time reports using the patient portal link. You can also have your primary care provider or your specialist, pull up the real time reports or review images. All CT type images are read by our radiology specialist and ER defers to their expertise on findings. As stated there are certain pathologies that can be missed even with the best attention to detail, due to the inherent limitation of the CAT scan imaging such as ulcers, small masses or tumors or even certain cancers. Also note that most if not all CAT scan imaging can find incidental findings. Incidental findings are reported findings that are not the main goal behind doing the CAT scan but are details that the radiologist sees that he feels the patient should know about. Generally most incidental findings are benign but NOT always. So please make sure you report all incidental findings to your primary care provider and your specialist, as they might need further imaging or testing to rule out certain conditions. You can potentially need other imaging such as MRI (magnetic resonance imaging), gastric endoscopy, colonoscopy, etc. Sometimes nothing needs to be done, but this is a decision to be made by the primary or specialist so please inform them of the findings.     For all other health concerns or if you have any emergencies please return to us immediately.

## 2024-09-08 NOTE — ED PROVIDER NOTE - DIFFERENTIAL DIAGNOSIS
Differential Diagnosis palpitation, anxiety, also diffuse pain, labs, CT. Pt also endorsed SI, signed out the care of the patient pending psych eval and reassess.
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
## 2024-09-08 NOTE — ED PROVIDER NOTE - CARE PLAN
Principal Discharge DX:	Abdominal pain  Secondary Diagnosis:	Palpitation   1 Principal Discharge DX:	Suicidal ideation  Secondary Diagnosis:	Palpitation

## 2024-09-08 NOTE — ED PROVIDER NOTE - CLINICAL SUMMARY MEDICAL DECISION MAKING FREE TEXT BOX
palpitation, anxiety, also diffuse pain, labs, CT. Pt also endorsed SI, signed out the care of the patient pending psych eval,

## 2024-09-08 NOTE — ED PROVIDER NOTE - CARE PROVIDERS DIRECT ADDRESSES
[email or web address]

## 2024-09-08 NOTE — ED BEHAVIORAL HEALTH ASSESSMENT NOTE - VIOLENCE PROTECTIVE FACTORS:
Residential stability/Relationship stability/Sobriety/Insight into violence risk and need for management/treatment/Good treatment response/compliance

## 2024-09-08 NOTE — ED PROVIDER NOTE - PATIENT PORTAL LINK FT
You can access the FollowMyHealth Patient Portal offered by [facility] by registering at the following website: [email or web address]. By joining [employer]’s FollowMyHealth portal, you will also be able to view your health information using other applications (apps) compatible with our system. You can access the FollowMyHealth Patient Portal offered by [facility] by registering at the following website: [email or web address]. By joining [employer]’s FollowMyHealth portal, you will also be able to view your health information using other applications (apps) compatible with our system.

## 2024-09-08 NOTE — ED BEHAVIORAL HEALTH ASSESSMENT NOTE - RISK ASSESSMENT
Risk factors: +current SIIP/HIIP, +CAH telling him to harm self and others, h/o SA/SIB, h/o psych admissions, +fam h/o, unable to care for self 2/2 psychiatric illness    Protective factors: no access to weapons, no active substance abuse, good physical health, domiciled,  social supports, positive therapeutic relationship, engaged in treatment, compliant with treatment, help-seeking behaviors

## 2024-09-08 NOTE — ED PROVIDER NOTE - NSICDXPASTMEDICALHX_GEN_ALL_CORE_FT
No
PAST MEDICAL HISTORY:  Asthma     Autism     Autism     Enuresis     Factitious disorder     GERD (gastroesophageal reflux disease)     History of BPH     HLD (hyperlipidemia)     Hypothyroid     Impulse control disorder     Intellectual disability     Myopia of both eyes     Obesity     Testicular cancer     Type 2 diabetes mellitus     Urinary retention

## 2024-09-08 NOTE — ED ADULT NURSE NOTE - NSFALLRISKINTERV_ED_ALL_ED

## 2024-09-08 NOTE — ED BEHAVIORAL HEALTH NOTE - BEHAVIORAL HEALTH NOTE
Psych Sw made aware Pt will need inpt  admission, [address] [place] census at 21 no beds available today. Psych Sw called surrounding facilities at this time there are no Adult/Male beds available. Pt, Psych Tx Team and ED team made aware. Pt will remain in ED until bed available.

## 2024-09-08 NOTE — ED PROVIDER NOTE - CARE PROVIDER_API CALL
Brock Gomez  Interventional Cardiology  [address] [place] [address], [place] [address]  Phone: [phone]  Fax: [phone]  Follow Up Time:     Enrique Perez  Gastroenterology  [address] [place] [id number]  Phone: [phone]  Fax: [phone]  Follow Up Time:

## 2024-09-08 NOTE — ED BEHAVIORAL HEALTH ASSESSMENT NOTE - DETAILS
maternal uncle with bipolar see above in hpi documented allergy to Haldol and Zyprexa see hpi ED attending  made aware of plan and to follow up with regarding need for Abx course Psych CL/telepsych to follow pt Psych CL/ telepsych to follow pt

## 2024-09-08 NOTE — ED ADULT NURSE REASSESSMENT NOTE - NS ED NURSE REASSESS COMMENT FT1
Report given by day RN. pt resting in stretcher, respirations even and unlabored. no other complaints at this time. safety and comfort measures maintained Report given by day RN. pt resting in stretcher, respirations even and unlabored. no other complaints at this time. safety and comfort measures maintained. 1:1 at bedside

## 2024-09-08 NOTE — ED BEHAVIORAL HEALTH ASSESSMENT NOTE - SUMMARY
34 year old  male, single, domiciled at [facility]. Patient has PPH of moderate ID, ASD, impulse control disorder, factitious disorder, HAVEN, mood disorders, PTSD and ADHD. Patient has extensive history of inpatient hospitalization and ED visits for both medical/psych complaints, most recently was discharged from [facility] in May 2024, longstanding h/o SIB (head banging, cutting), h/o SA via self inflected lacerations to his abdomen which required hospitalization, longstanding h/o endorsing SI, h/o aggression toward staff at , PMHx significant for testicular cancer which is currently stable (had orchectomy in 2023) asthma, DM, urinary retention, GERD, BPH, hypothyroidism, HLD, HTN, b/l myopia was BIB EMS from [facility] home co chest palpitations s/p drinking 3 monster energies because "the voices inside my head told me to do so". Pt states "the voices are telling me to punch". As per Aide and pt, pt has hx of endorsing SI and endorsing SI at this time. Pt's aide at bedside.  Psychiatry was consulted to assess for SI. 34 year old  male, single, domiciled at [facility]. Patient has PPH of moderate ID, ASD, impulse control disorder, factitious disorder, HAVEN, mood disorders, PTSD and ADHD. Patient has extensive history of inpatient hospitalization and ED visits for both medical/psych complaints, most recently was discharged from [facility] in May 2024, longstanding h/o SIB (head banging, cutting), h/o SA via self inflected lacerations to his abdomen which required hospitalization, longstanding h/o endorsing SI, h/o aggression toward staff at , PMHx significant for testicular cancer which is currently stable (had orchectomy in 2023) asthma, DM, urinary retention, GERD, BPH, hypothyroidism, HLD, HTN, b/l myopia was BIB EMS from [facility] home co chest palpitations s/p drinking 3 monster energies because "the voices inside my head told me to do so". Pt states "the voices are telling me to punch". As per Aide and pt, pt has hx of endorsing SI and endorsing SI at this time. Pt's aide at bedside.  Psychiatry was consulted to assess for SI.  Patient seen and evaluated today, awake and alert, reports has been having +CAH telling him to harm himself and others.  Reports despite being on a 1:1 at his group home, manages to get contraband, was trying to take and break a glass perfume bottle in order to take glass to cut himself.  He also reports he has been aggressive towards staff at home.  He currently endorses +SI with plan to cut himself, he does not feel safe.  At this time, given previous h/o patient making significant lacerations to his abdomen and arms which led to medical/psychiatric hospitalization, is high risk given worsening hallucinations.  Patient reporting feeling unsafe that he may act on these hallucinations.  Patient is a risk of harm to self and others and meets criteria for psychiatric hospitalization on an involuntary status.

## 2024-09-08 NOTE — ED ADULT NURSE NOTE - CHIEF COMPLAINT
Health Maintenance Summary     Topic Due On Due Status Completed On Postpone Until Reason    Immunization-Zoster  Completed Jul 16, 2013      Immunization - Pneumococcal  Completed Sep 14, 2015      Medicare Wellness Visit Sep 15, 2017 Due Soon Sep 15, 2016      IMMUNIZATION - DTaP/Tdap/Td Jan 25, 1956 Postponed  Jul 28, 2017 Patient Refused    Immunization-Influenza Sep 1, 2017 Not Due Sep 2, 2016            Patient is due for topics as listed above, he wishes to decline at this time .       The patient is a 34y Male complaining of psychiatric evaluation.

## 2024-09-08 NOTE — ED ADULT NURSE NOTE - NSFALLRISK_ED_ALL_ED
No Patient tolerated xolair injections without difficulty AND DC'D with no S/S of reaction. Instructed to call her prescribing MD for any concerns prior to next appt. Discharged per ambulation.

## 2024-09-08 NOTE — ED ADULT NURSE REASSESSMENT NOTE - NS ED NURSE REASSESS COMMENT FT1
Pt received from Bebo BRIONES. Pt endorses SI and wants to see psych. Pt remains on 1:1. MD Dumont aware, psych consult to be called. Pt receiving ABX as per MD Dumont for possible stomach infection.

## 2024-09-08 NOTE — PHARMACOTHERAPY INTERVENTION NOTE - COMMENTS
Medication history complete. Medications and allergies reviewed with list provided by [facility] and confirmed with .

## 2024-09-08 NOTE — ED BEHAVIORAL HEALTH ASSESSMENT NOTE - OTHER PAST PSYCHIATRIC HISTORY (INCLUDE DETAILS REGARDING ONSET, COURSE OF ILLNESS, INPATIENT/OUTPATIENT TREATMENT)
moderate ID, ASD, impulse control disorder, factitious disorder, HAVEN, mood disorders, PTSD and ADHD, schizoaffective d/o. Patient has extensive history of inpatient hospitalization and ED visits for both medical/psych complaints, most recently was discharged from [facility] in May 2024, longstanding h/o SIB (head banging, cutting), h/o SA via self inflected lacerations to his abdomen which required hospitalization, longstanding h/o endorsing SI, h/o aggression toward staff at

## 2024-09-08 NOTE — ED BEHAVIORAL HEALTH ASSESSMENT NOTE - DESCRIPTION
calm, in control, endorsing +CAH to harm self and others  ICU Vital Signs Last 24 Hrs  T(C): 36.6 (08 Sep 2024 09:09), Max: 36.8 (08 Sep 2024 05:40)  T(F): 97.9 (08 Sep 2024 09:09), Max: 98.2 (08 Sep 2024 05:40)  HR: 68 (08 Sep 2024 09:09) (68 - 88)  BP: 113/65 (08 Sep 2024 09:09) (90/67 - 127/80)  BP(mean): 75 (08 Sep 2024 09:09) (75 - 75)  ABP: --  ABP(mean): --  RR: 16 (08 Sep 2024 09:09) (16 - 18)  SpO2: 99% (08 Sep 2024 09:09) (97% - 99%)    O2 Parameters below as of 08 Sep 2024 09:09  Patient On (Oxygen Delivery Method): room air    Rec'ed Keflex and Flexeril x 1 dose at 0800 r testicular cancer which is currently stable (had orchectomy in 2023) asthma, DM, urinary retention, GERD, BPH, hypothyroidism, HLD, HTN, b/l myopia single, disabled, 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

## 2024-09-08 NOTE — ED ADULT NURSE NOTE - CHIEF COMPLAINT QUOTE
Pt BIBEMS from [facility] group home co chest palpitations s/p drinking 3 monster energies because "the voices inside my head told me to do so". Pt states "the voices are telling me to punch". As per Aide and pt, pt has hx of endorsing SI and endorsing SI at this time. Pt's aide at bedside. As per EMS, pt "has been in and out of HH and pt is stating he's always been DC'd". Denies SOB, N/V, fever. PMHx autism, SI, AOx4, +allergies. 1:1 initiated. STAT EKG in progress, pt placed on TX in triage.

## 2024-09-08 NOTE — ED BEHAVIORAL HEALTH ASSESSMENT NOTE - CURRENT MEDICATION
Buspar 15mg qHS, Depakote 500mg daily, 1000mg qHS, Risperdal 4mg daily, Clonidine 0.2mg TID, Remeron 15mg qHS

## 2024-09-08 NOTE — ED BEHAVIORAL HEALTH ASSESSMENT NOTE - PSYCHIATRIC ISSUES AND PLAN (INCLUDE STANDING AND PRN MEDICATION)
continue with his home medications for now: Buspar 15mg qHS, Depakote 500mg daily, 1000mg qHS, Risperdal 4mg daily, Clonidine 0.2mg TID, Remeron 15mg qHS. Check VPA level in AM. PRN: Thorazine 50mg/im prn q6 for agitation (monitor qtc <500ms on ekg)

## 2024-09-08 NOTE — ED ADULT NURSE NOTE - OBJECTIVE STATEMENT
Pt BIBEMS from [facility] w c/o chest palpitations and SI. Pt is A&Ox4 and states "I've been having palpitations all day after drinking three red bulls. I've been hearing voices and the voices in my head told me to drink the red bulls and punch something". 1:1 Constant observation is maintained. EKG was completed and pt was placed on a cardiac monitor upon arrival. -Hx of autism Pt BIBEMS from [facility] w c/o chest palpitations and SI. Pt is A&Ox4 and states "I've been having palpitations all day after drinking three red bulls. I've been hearing voices and the voices in my head told me to drink the red bulls and punch something". 1:1 Constant observation is maintained. EKG was completed and pt was placed on a cardiac monitor upon arrival. Old scars noted to b/l arms. Pt states "I have scars because I cut myself with broken glass". -Hx of autism

## 2024-09-08 NOTE — ED ADULT TRIAGE NOTE - CHIEF COMPLAINT QUOTE
Pt BIBEMS from [facility] group home co chest palpitations s/p drinking 3 monster energies because "the voices inside my head told me to do so". Pt states "the voices are telling me to punch". As per Aide and pt, pt has hx of endorsing SI and endorsing SI at this time. Pt's aide at bedside. As per EMS, pt "has been in and out of HH and pt is stating he's always been DC'd". AOx4, +allergies. 1:1 initiated. STAT EKG in progress. Pt BIBEMS from [facility] group home co chest palpitations s/p drinking 3 monster energies because "the voices inside my head told me to do so". Pt states "the voices are telling me to punch". As per Aide and pt, pt has hx of endorsing SI and endorsing SI at this time. Pt's aide at bedside. As per EMS, pt "has been in and out of HH and pt is stating he's always been DC'd". Denies SOB, N/V, fever. PMHx autism, SI, AOx4, +allergies. 1:1 initiated. STAT EKG in progress, pt placed on TX in triage.

## 2024-09-09 LAB
GLUCOSE BLDC GLUCOMTR-MCNC: 149 MG/DL — HIGH (ref 70–99)
VALPROATE SERPL-MCNC: 86 UG/ML — SIGNIFICANT CHANGE UP (ref 50–100)

## 2024-09-09 PROCEDURE — 99214 OFFICE O/P EST MOD 30 MIN: CPT

## 2024-09-09 RX ORDER — LORAZEPAM 4 MG/ML
1 INJECTION INTRAMUSCULAR; INTRAVENOUS ONCE
Refills: 0 | Status: DISCONTINUED | OUTPATIENT
Start: 2024-09-09 | End: 2024-09-09

## 2024-09-09 RX ORDER — LORAZEPAM 4 MG/ML
2 INJECTION INTRAMUSCULAR; INTRAVENOUS ONCE
Refills: 0 | Status: DISCONTINUED | OUTPATIENT
Start: 2024-09-09 | End: 2024-09-09

## 2024-09-09 RX ORDER — RISPERIDONE 0.25 MG/1
2 TABLET, FILM COATED ORAL
Refills: 0 | Status: DISCONTINUED | OUTPATIENT
Start: 2024-09-09 | End: 2024-09-10

## 2024-09-09 RX ORDER — CHLORPROMAZINE HCL 50 MG
50 TABLET ORAL EVERY 4 HOURS
Refills: 0 | Status: DISCONTINUED | OUTPATIENT
Start: 2024-09-09 | End: 2024-09-10

## 2024-09-09 RX ADMIN — Medication 0.2 MILLIGRAM(S): at 06:32

## 2024-09-09 RX ADMIN — LORAZEPAM 2 MILLIGRAM(S): 4 INJECTION INTRAMUSCULAR; INTRAVENOUS at 11:17

## 2024-09-09 NOTE — ED ADULT NURSE REASSESSMENT NOTE - NS ED NURSE REASSESS COMMENT FT1
pt care assumed from previous RNChinyere. 1-1 at bedside, safety maintained. group home staff at bedside as well. Pt has no complaints at this time. Resting comfortably in bed. VS stable.

## 2024-09-09 NOTE — ED ADULT NURSE REASSESSMENT NOTE - NS ED NURSE REASSESS COMMENT FT1
Report received from ANALI Peck. Patient resting in stretcher with no complaints of pain or discomfort at this time. Patient awaiting an inpatient psychiatric bed. Patient voiced that he wanted to go home because he has been waiting 3 days for a bed. 1:1 in place for safety.

## 2024-09-09 NOTE — ED ADULT NURSE REASSESSMENT NOTE - NS ED NURSE REASSESS COMMENT FT1
Pt agitated and threatening staff. Code grey called and prescribed Ativan administered. 1:1 continued along with pt aide at bedside. Safety measures maintained.

## 2024-09-09 NOTE — ED ADULT NURSE REASSESSMENT NOTE - NS ED NURSE REASSESS COMMENT FT1
Sat with patient while he voiced concerns about not wanting to stay and that he wanted to go home. Patient was able to be verbally de-escalated. Patient provided with a hospital bed and blankets. Patient resting comfortably at this time. 1:1 in place for safety.

## 2024-09-09 NOTE — ED ADULT NURSE REASSESSMENT NOTE - NS ED NURSE REASSESS COMMENT FT1
pt stated to Staff at bedside, "if he is woken up again, he will start swinging". pt is snoring, no acute distress noted. 1-1 at bedside.  MD Hayes made aware, medication not given. Saftey maintained.

## 2024-09-10 VITALS
DIASTOLIC BLOOD PRESSURE: 74 MMHG | HEART RATE: 86 BPM | OXYGEN SATURATION: 96 % | TEMPERATURE: 99 F | RESPIRATION RATE: 18 BRPM | SYSTOLIC BLOOD PRESSURE: 117 MMHG

## 2024-09-10 PROCEDURE — 99214 OFFICE O/P EST MOD 30 MIN: CPT

## 2024-09-10 RX ORDER — LORAZEPAM 4 MG/ML
2 INJECTION INTRAMUSCULAR; INTRAVENOUS EVERY 4 HOURS
Refills: 0 | Status: DISCONTINUED | OUTPATIENT
Start: 2024-09-10 | End: 2024-09-10

## 2024-09-10 RX ADMIN — RISPERIDONE 2 MILLIGRAM(S): 0.25 TABLET, FILM COATED ORAL at 09:46

## 2024-09-10 NOTE — ED ADULT NURSE REASSESSMENT NOTE - NS ED NURSE REASSESS COMMENT FT1
Obtained report from ANALI Delgadillo. Pt resting comfortably in bed. VS obtained and stable. 1:1 status continued. Safety measures maintained.

## 2024-09-10 NOTE — ED BEHAVIORAL HEALTH PROGRESS NOTE - BILLING CODES
99109-Pfmrwazhtp hospital care - moderate complexity 30-39 minutes
17010-Uxdcjuuhgk hospital care - moderate complexity 30-39 minutes

## 2024-09-10 NOTE — ED ADULT NURSE REASSESSMENT NOTE - NS ED NURSE REASSESS COMMENT FT1
1-1 at bedside. Pt allowed RN to take vital signs. Safety maintained. Pt urinating in urinal. Given snacks while waiting for breakfast. Pt aware he is still waiting for bed placement.

## 2024-09-10 NOTE — ED BEHAVIORAL HEALTH PROGRESS NOTE - NSICDXBHSECONDARYDX_PSY_ALL_CORE
Autism   F84.0  Schizoaffective disorder, depressive type   F25.1  
Autism   F84.0  Schizoaffective disorder, depressive type   F25.1

## 2024-09-10 NOTE — ED BEHAVIORAL HEALTH PROGRESS NOTE - RISK ASSESSMENT
Risk factors: +current SIIP/HIIP, +CAH telling him to harm self and others, h/o SA/SIB, h/o psych admissions, +fam h/o, unable to care for self 2/2 psychiatric illness    Protective factors: no access to weapons, no active substance abuse, good physical health, domiciled,  social supports, positive therapeutic relationship, engaged in treatment, compliant with treatment, help-seeking behaviors
Risk factors: +current SIIP/HIIP, +CAH telling him to harm self and others, h/o SA/SIB, h/o psych admissions, +fam h/o, unable to care for self 2/2 psychiatric illness    Protective factors: no access to weapons, no active substance abuse, good physical health, domiciled,  social supports, positive therapeutic relationship, engaged in treatment, compliant with treatment, help-seeking behaviors

## 2024-09-10 NOTE — ED BEHAVIORAL HEALTH PROGRESS NOTE - SUMMARY
34 year old  male, single, domiciled at [facility]. Patient has PPH of moderate ID, ASD, impulse control disorder, factitious disorder, HAVEN, mood disorders, PTSD and ADHD. Patient has extensive history of inpatient hospitalization and ED visits for both medical/psych complaints, most recently was discharged from [facility] in May 2024, longstanding h/o SIB (head banging, cutting), h/o SA via self inflected lacerations to his abdomen which required hospitalization, longstanding h/o endorsing SI, h/o aggression toward staff at , PMHx significant for testicular cancer which is currently stable (had orchectomy in 2023) asthma, DM, urinary retention, GERD, BPH, hypothyroidism, HLD, HTN, b/l myopia was BIB EMS from [facility] home co chest palpitations s/p drinking [employer] because "the voices inside my head told me to do so". Pt states "the voices are telling me to punch". As per Aide and pt, pt has hx of endorsing SI and endorsing SI at this time. Pt's aide at bedside.  Psychiatry was consulted to assess for SI.  Patient seen and evaluated today, awake and alert, reports has been having +CAH telling him to harm himself and others.  Reports despite being on a 1:1 at his group home, manages to get contraband, was trying to take and break a glass perfume bottle in order to take glass to cut himself.  He also reports he has been aggressive towards staff at home.  He currently endorses +SI with plan to cut himself, he does not feel safe.  At this time, given previous h/o patient making significant lacerations to his abdomen and arms which led to medical/psychiatric hospitalization, is high risk given worsening hallucinations.  Patient reporting feeling unsafe that he may act on these hallucinations.  Patient is a risk of harm to self and others and meets criteria for psychiatric hospitalization on an involuntary status.
34 year old  male, single, domiciled at [facility]. Patient has PPH of moderate ID, ASD, impulse control disorder, factitious disorder, HAVEN, mood disorders, PTSD and ADHD. Patient has extensive history of inpatient hospitalization and ED visits for both medical/psych complaints, most recently was discharged from [facility] in May 2024, longstanding h/o SIB (head banging, cutting), h/o SA via self inflected lacerations to his abdomen which required hospitalization, longstanding h/o endorsing SI, h/o aggression toward staff at , PMHx significant for testicular cancer which is currently stable (had orchectomy in 2023) asthma, DM, urinary retention, GERD, BPH, hypothyroidism, HLD, HTN, b/l myopia was BIB EMS from [facility] home co chest palpitations s/p drinking 3 [employer] because "the voices inside my head told me to do so". Pt states "the voices are telling me to punch". As per Aide and pt, pt has hx of endorsing SI and endorsing SI at this time. Pt's aide at bedside.  Psychiatry was consulted to assess for SI.  Patient seen and evaluated today, awake and alert, reports has been having +CAH telling him to harm himself and others.  Reports despite being on a 1:1 at his group home, manages to get contraband, was trying to take and break a glass perfume bottle in order to take glass to cut himself.  He also reports he has been aggressive towards staff at home.  He currently endorses +SI with plan to cut himself, he does not feel safe.  At this time, given previous h/o patient making significant lacerations to his abdomen and arms which led to medical/psychiatric hospitalization, is high risk given worsening hallucinations.  Patient reporting feeling unsafe that he may act on these hallucinations.  Patient is a risk of harm to self and others and meets criteria for psychiatric hospitalization on an involuntary status.

## 2024-09-10 NOTE — ED BEHAVIORAL HEALTH PROGRESS NOTE - NSBHMSEGAIT_PSY_A_CORE
Normal gait / station
Normal gait / station
no dermatitis, no environmental allergies, no food allergies, no immunosuppressive disorder, and no pruritus.

## 2024-09-10 NOTE — ED BEHAVIORAL HEALTH PROGRESS NOTE - CASE SUMMARY/FORMULATION (CLEARLY DOCUMENT RATIONALE FOR DISPOSITION CHANGE)
Today pt is agitated, requesting to be d/c-ed.   he personated psychotic with CAH and needs inpatient psych admission.    CSW team looking for a bed to transfer.  
Today pt is agitated, requesting to be d/c-ed.   he personated psychotic with CAH and needs inpatient psych admission.    9/10/24:  collaterals form psychiatrist. This is pt baseline and he bedoya t need to be admitted. F/U li is tomorrow. See CSW note.   PT can be d/yasmin to group home with plan to f/u with his psychiatrist on 9/11/24.

## 2024-09-10 NOTE — ED BEHAVIORAL HEALTH NOTE - BEHAVIORAL HEALTH NOTE
Spoke with psychiatrist who asked for assistance obtaining collaterals to make disposition decision.  This writer contacted Dr. Placido Moran, [phone].  Psychiatrist stated that this is baseline behavior for patient and he does not feel the patient requires admission.  He reports he will see patient tomorrow at 10am.  Discussed with Dr. Garcia who will discharge patient back to group home.

## 2024-09-10 NOTE — ED ADULT NURSE REASSESSMENT NOTE - NS ED NURSE REASSESS COMMENT FT1
pt remains asleep and snoring. Refusing vital signs to be taken at this time. 1-1 in effect. No acute distress noted. Safety maintained and staff at bedside.

## 2024-09-10 NOTE — ED BEHAVIORAL HEALTH PROGRESS NOTE - DETAILS:
Patient is feeling better this morning.  He is calm and pleasant.  He says he would like his medication to be changed because there is is not working.    Currently he does not have thoughts about harming himself and does not hear voices.    As per his psychiatrist this is patient's baseline.  He goes from ED to ED and he has good change medication.  He said his psychiatrist does not think patient needs to go to inpatient psychiatry and does not think patient would benefit from that.    Psychiatrist will see patient tomorrow.    
This morning pt ia agitated want to leave.   However decision to admit over the weekend due to psychosis with CAH telling him to kill himself  ,

## 2024-09-15 ENCOUNTER — EMERGENCY (EMERGENCY)
Facility: HOSPITAL | Age: 34
LOS: 1 days | Discharge: HOME | End: 2024-09-15
Attending: EMERGENCY MEDICINE | Admitting: EMERGENCY MEDICINE
Payer: MEDICAID

## 2024-09-15 VITALS
DIASTOLIC BLOOD PRESSURE: 75 MMHG | HEIGHT: 69 IN | HEART RATE: 85 BPM | WEIGHT: 300.05 LBS | OXYGEN SATURATION: 98 % | RESPIRATION RATE: 18 BRPM | TEMPERATURE: 99 F | SYSTOLIC BLOOD PRESSURE: 108 MMHG

## 2024-09-15 DIAGNOSIS — Z90.79 ACQUIRED ABSENCE OF OTHER GENITAL ORGAN(S): Chronic | ICD-10-CM

## 2024-09-15 PROCEDURE — 99283 EMERGENCY DEPT VISIT LOW MDM: CPT | Mod: 25

## 2024-09-15 PROCEDURE — 99283 EMERGENCY DEPT VISIT LOW MDM: CPT

## 2024-09-15 PROCEDURE — 73130 X-RAY EXAM OF HAND: CPT | Mod: 26,RT

## 2024-09-15 PROCEDURE — 73130 X-RAY EXAM OF HAND: CPT

## 2024-09-15 NOTE — ED ADULT TRIAGE NOTE - CHIEF COMPLAINT QUOTE
c/o R hand/arm injury. pt heard voices which told him to punch the wall. Pt does have psych hx depression, bipolar, ADHA, Autism, . Non-compliant with taking psych medication. Pt denies SI. pt resides at [facility].

## 2024-09-15 NOTE — ED PROVIDER NOTE - PATIENT PORTAL LINK FT
You can access the FollowMyHealth Patient Portal offered by [facility] by registering at the following website: [email or web address]. By joining [employer]’s FollowMyHealth portal, you will also be able to view your health information using other applications (apps) compatible with our system.

## 2024-09-15 NOTE — ED PROVIDER NOTE - CLINICAL SUMMARY MEDICAL DECISION MAKING FREE TEXT BOX
34-year-old male with right hand pain after punching a wall.  X-ray rule out fracture dislocation.  Pain control as needed.

## 2024-09-15 NOTE — ED ADULT NURSE NOTE - OBJECTIVE STATEMENT
34 yr old male arrives to ED c/o right hand after punching wall at facility, Edema noted area red no opining to skin, pt able to move hand. Pt hx of psych admits to SI unsure of plan/ no HI, Pt placed on observation ED tech at bedside. Pt placed in hospital gown, clothing placed in hospital bag, pt denies fever chills, chest pain or sob at this time. Chas BRIONES

## 2024-09-15 NOTE — ED PROVIDER NOTE - CARE PROVIDERS DIRECT ADDRESSES
[email or web address]

## 2024-09-15 NOTE — ED ADULT NURSE REASSESSMENT NOTE - NS ED NURSE REASSESS COMMENT FT1
pt reavaluated  and xray done and negative pt able to holf drink with no pain dc back with caregiver  to follow up with ortho
received pt stable awake with no c/o at present awaiting to be seen by MD bailey with aide at bedsidefrom facility
pt reavaluated awaiting to be seen by MD bailey with aide at bedside

## 2024-09-15 NOTE — ED PROVIDER NOTE - NSFOLLOWUPINSTRUCTIONS_ED_ALL_ED_FT
Hand Contusion  A hand contusion is a deep bruise to the hand. Deep bruises can happen when an injury causes bleeding under the skin. The skin over the bruise may be red and then turn blue, purple, or yellow.    A minor injury may cause a deep bruise that is painless. A very bad injury may cause a deep bruise that stays painful and swollen for a few weeks.    What are the causes?  A hard hit or direct force to your hand, such as having a heavy object fall on your hand.  What are the signs or symptoms?  A swollen hand.  Pain and tenderness in your hand.  Discoloration of your hand. The area may have redness. Then, it may turn:  Blue.  Purple.  Yellow.  How is this treated?  Doing RICE therapy. This includes:  Rest.  Ice.  Pressure (compression).  Raising (elevating) the injured area.  Using an elastic wrap to support your hand.  Taking over-the-counter medicines to control pain.  Follow these instructions at home:  RICE therapy    Rest the injured area.  If told, put ice on the injured area.  Put ice in a plastic bag.  Place a towel between your skin and the bag.  Leave the ice on for 20 minutes, 2–3 times a day.  If told, put light pressure on the injured area using an elastic wrap.  Make sure the wrap is not too tight.  Take the wrap off and put it back on more loosely if your fingers:  Get numb.  Turn cold.  Turn blue.  Remove and put the wrap back on as told by your doctor.  A bag of ice on a towel on the skin.Raise (elevate) the injured area above the level of your heart while you are sitting or lying down.  General instructions    Take over-the-counter and prescription medicines only as told by your doctor.  Protect your hand from getting more injured.  Keep all follow-up visits as told by your doctor. This is important.  Contact a health care provider if:  Your symptoms do not get better after several days of treatment.  Your hand or fingers have more:  Redness.  Swelling.  Pain.  You have trouble moving the injured area.  Medicine does not help your swelling or pain.  Get help right away if:  You have very bad pain.  Your hand or fingers are numb.  Your hand or fingers turn:  Very light (pale).  Blue.  Cold.  You cannot move your hand or wrist.  Your hand feels warm when you touch it.  Summary  A hand contusion is a deep bruise to the hand.  Deep bruises can happen when an injury causes bleeding under the skin.  This injury is treated with rest, ice, pressure (compression), and elevation.  This information is not intended to replace advice given to you by your health care provider. Make sure you discuss any questions you have with your health care provider.    Document Revised: 11/20/2023 Document Reviewed: 11/20/2023  Elsevier Patient Education © 2024 [employer] Inc.

## 2024-09-15 NOTE — ED ADULT NURSE NOTE - CHIEF COMPLAINT QUOTE
c/o R hand/arm injury. pt heard voices which told him to punch the wall. Pt does have psych hx depression, bipolar, ADHA, Autism, . Non-compliant with taking psych medication. Pt denies SI. pt resides at [facility].

## 2024-09-15 NOTE — ED ADULT TRIAGE NOTE - WEIGHT IN KG
59 yo male ex smoker w/ PMHx HTN, HL here for chest pain was noted to be in VT/ torsades cardiac arrest now s/p resuscitation and intubation, on mechanical ventilation in the ICU.     HD stable improving.     Post arrest, pt ECG was positive for STEMI and cardio was consulted who deferred cath for now given resolution of the findings.   Pt is on hep and amio gtt, w/ antiplatelets added for now.   Awaiting ECHO in am.     CTA chest:  - 1.  No evidence of pulmonary thromboembolism through the level of the proximal segmental arteries.  2.  Bilateral predominantly dependent patchy and consolidative opacities, most pronounced at the lung bases.  This may relate to atelectasis, aspiration, and/or infectious process.  3.  Cardiomegaly.  Scattered coronary artery calcification.  4.  Minimally displaced fracture of the right anterior 5th rib.    CXR:  - There is a right-sided PICC line in place with tip projecting over the expected location of the distal SVC.  Endotracheal tube and esophagogastric tube project in similar radiographic position.  Cardiac silhouette is stable in size.  Lung volumes are diminished with resultant bronchovascular crowding.  There are mild subsegmental opacities at the lung bases.  No definite evidence of pneumothorax.    CT head:  - No CT evidence of acute intracranial abnormality. Clinical correlation and further evaluation as warranted.    Pt noted to responding to stimulus, no cooling done. Avoid fevers.     Detailed H&P noted in the chart.   Cardio and neuro evalve in am.     Please call for further assistance.     ABG now is 7.42/36/129 on 500/+5  LA 6.1 > 2.9 now  COVID negative  Trop 32, but post arrest, will need f/up.   Pepcid for SUP and Hep gtt for VTE prophy.   Please watch for s/s bleeding.              136.1

## 2024-09-15 NOTE — ED PROVIDER NOTE - OBJECTIVE STATEMENT
34-year-old male with significant past medical history for autism, diabetes, hyperlipidemia, asthma presents to the ED with complaints of right hand pain status post punching a wall prior to arrival.  Patient was seen in the ED 1 week ago for similar symptoms.  Patient states he was seen by orthopedics and was given a hand splint that he does not she is to wear.

## 2024-09-17 NOTE — ED ADULT NURSE NOTE - NSSISCREENINGQ1_ED_A_ED
No
unable to perform secondary to fatigue/SOB after ambulating on level with rolling walker at this time

## 2024-09-19 ENCOUNTER — INPATIENT (INPATIENT)
Facility: HOSPITAL | Age: 34
LOS: 1 days | Discharge: HOME | DRG: 696 | End: 2024-09-21
Attending: STUDENT IN AN ORGANIZED HEALTH CARE EDUCATION/TRAINING PROGRAM | Admitting: INTERNAL MEDICINE
Payer: MEDICAID

## 2024-09-19 ENCOUNTER — EMERGENCY (EMERGENCY)
Facility: HOSPITAL | Age: 34
LOS: 1 days | Discharge: HOME | End: 2024-09-19
Attending: EMERGENCY MEDICINE | Admitting: EMERGENCY MEDICINE
Payer: MEDICAID

## 2024-09-19 VITALS
TEMPERATURE: 98 F | SYSTOLIC BLOOD PRESSURE: 132 MMHG | HEIGHT: 69 IN | HEART RATE: 86 BPM | OXYGEN SATURATION: 98 % | DIASTOLIC BLOOD PRESSURE: 93 MMHG | RESPIRATION RATE: 17 BRPM | WEIGHT: 315 LBS

## 2024-09-19 VITALS
SYSTOLIC BLOOD PRESSURE: 134 MMHG | HEART RATE: 99 BPM | DIASTOLIC BLOOD PRESSURE: 92 MMHG | TEMPERATURE: 98 F | RESPIRATION RATE: 18 BRPM | OXYGEN SATURATION: 98 %

## 2024-09-19 VITALS
RESPIRATION RATE: 18 BRPM | TEMPERATURE: 98 F | HEART RATE: 86 BPM | DIASTOLIC BLOOD PRESSURE: 77 MMHG | OXYGEN SATURATION: 100 % | HEIGHT: 69 IN | SYSTOLIC BLOOD PRESSURE: 160 MMHG

## 2024-09-19 DIAGNOSIS — Z29.9 ENCOUNTER FOR PROPHYLACTIC MEASURES, UNSPECIFIED: ICD-10-CM

## 2024-09-19 DIAGNOSIS — E11.9 TYPE 2 DIABETES MELLITUS WITHOUT COMPLICATIONS: ICD-10-CM

## 2024-09-19 DIAGNOSIS — R33.9 RETENTION OF URINE, UNSPECIFIED: ICD-10-CM

## 2024-09-19 DIAGNOSIS — I10 ESSENTIAL (PRIMARY) HYPERTENSION: ICD-10-CM

## 2024-09-19 DIAGNOSIS — Z85.47 PERSONAL HISTORY OF MALIGNANT NEOPLASM OF TESTIS: ICD-10-CM

## 2024-09-19 DIAGNOSIS — Z90.79 ACQUIRED ABSENCE OF OTHER GENITAL ORGAN(S): Chronic | ICD-10-CM

## 2024-09-19 DIAGNOSIS — Z86.59 PERSONAL HISTORY OF OTHER MENTAL AND BEHAVIORAL DISORDERS: ICD-10-CM

## 2024-09-19 DIAGNOSIS — E03.9 HYPOTHYROIDISM, UNSPECIFIED: ICD-10-CM

## 2024-09-19 DIAGNOSIS — E78.5 HYPERLIPIDEMIA, UNSPECIFIED: ICD-10-CM

## 2024-09-19 DIAGNOSIS — G40.909 EPILEPSY, UNSPECIFIED, NOT INTRACTABLE, WITHOUT STATUS EPILEPTICUS: ICD-10-CM

## 2024-09-19 LAB
ALBUMIN SERPL ELPH-MCNC: 3.7 G/DL — SIGNIFICANT CHANGE UP (ref 3.3–5)
ALP SERPL-CCNC: 91 U/L — SIGNIFICANT CHANGE UP (ref 40–120)
ALT FLD-CCNC: 73 U/L — SIGNIFICANT CHANGE UP (ref 12–78)
ANION GAP SERPL CALC-SCNC: 7 MMOL/L — SIGNIFICANT CHANGE UP (ref 5–17)
ANION GAP SERPL CALC-SCNC: 8 MMOL/L — SIGNIFICANT CHANGE UP (ref 5–17)
APPEARANCE UR: CLEAR — SIGNIFICANT CHANGE UP
AST SERPL-CCNC: 38 U/L — HIGH (ref 15–37)
BASOPHILS # BLD AUTO: 0.04 K/UL — SIGNIFICANT CHANGE UP (ref 0–0.2)
BASOPHILS NFR BLD AUTO: 0.6 % — SIGNIFICANT CHANGE UP (ref 0–2)
BILIRUB SERPL-MCNC: 0.4 MG/DL — SIGNIFICANT CHANGE UP (ref 0.2–1.2)
BILIRUB UR-MCNC: NEGATIVE — SIGNIFICANT CHANGE UP
BUN SERPL-MCNC: 19 MG/DL — SIGNIFICANT CHANGE UP (ref 7–23)
BUN SERPL-MCNC: 21 MG/DL — SIGNIFICANT CHANGE UP (ref 7–23)
CALCIUM SERPL-MCNC: 10.1 MG/DL — SIGNIFICANT CHANGE UP (ref 8.5–10.1)
CALCIUM SERPL-MCNC: 9.9 MG/DL — SIGNIFICANT CHANGE UP (ref 8.5–10.1)
CHLORIDE SERPL-SCNC: 104 MMOL/L — SIGNIFICANT CHANGE UP (ref 96–108)
CHLORIDE SERPL-SCNC: 104 MMOL/L — SIGNIFICANT CHANGE UP (ref 96–108)
CO2 SERPL-SCNC: 23 MMOL/L — SIGNIFICANT CHANGE UP (ref 22–31)
CO2 SERPL-SCNC: 27 MMOL/L — SIGNIFICANT CHANGE UP (ref 22–31)
COLOR SPEC: YELLOW — SIGNIFICANT CHANGE UP
CREAT SERPL-MCNC: 0.93 MG/DL — SIGNIFICANT CHANGE UP (ref 0.5–1.3)
CREAT SERPL-MCNC: 1 MG/DL — SIGNIFICANT CHANGE UP (ref 0.5–1.3)
DIFF PNL FLD: NEGATIVE — SIGNIFICANT CHANGE UP
EGFR: 101 ML/MIN/1.73M2 — SIGNIFICANT CHANGE UP
EGFR: 110 ML/MIN/1.73M2 — SIGNIFICANT CHANGE UP
EGFR: 110 ML/MIN/1.73M2 — SIGNIFICANT CHANGE UP
EOSINOPHIL # BLD AUTO: 0.14 K/UL — SIGNIFICANT CHANGE UP (ref 0–0.5)
EOSINOPHIL NFR BLD AUTO: 2.1 % — SIGNIFICANT CHANGE UP (ref 0–6)
GLUCOSE SERPL-MCNC: 128 MG/DL — HIGH (ref 70–99)
GLUCOSE SERPL-MCNC: 134 MG/DL — HIGH (ref 70–99)
GLUCOSE UR QL: NEGATIVE MG/DL — SIGNIFICANT CHANGE UP
HCT VFR BLD CALC: 41.2 % — SIGNIFICANT CHANGE UP (ref 39–50)
HGB BLD-MCNC: 13.3 G/DL — SIGNIFICANT CHANGE UP (ref 13–17)
IMM GRANULOCYTES NFR BLD AUTO: 0.4 % — SIGNIFICANT CHANGE UP (ref 0–0.9)
KETONES UR-MCNC: ABNORMAL MG/DL
LEUKOCYTE ESTERASE UR-ACNC: NEGATIVE — SIGNIFICANT CHANGE UP
LYMPHOCYTES # BLD AUTO: 1.92 K/UL — SIGNIFICANT CHANGE UP (ref 1–3.3)
LYMPHOCYTES # BLD AUTO: 28.3 % — SIGNIFICANT CHANGE UP (ref 13–44)
MCHC RBC-ENTMCNC: 24.7 PG — LOW (ref 27–34)
MCHC RBC-ENTMCNC: 32.3 GM/DL — SIGNIFICANT CHANGE UP (ref 32–36)
MCV RBC AUTO: 76.6 FL — LOW (ref 80–100)
MONOCYTES # BLD AUTO: 0.53 K/UL — SIGNIFICANT CHANGE UP (ref 0–0.9)
MONOCYTES NFR BLD AUTO: 7.8 % — SIGNIFICANT CHANGE UP (ref 2–14)
NEUTROPHILS # BLD AUTO: 4.12 K/UL — SIGNIFICANT CHANGE UP (ref 1.8–7.4)
NEUTROPHILS NFR BLD AUTO: 60.8 % — SIGNIFICANT CHANGE UP (ref 43–77)
NITRITE UR-MCNC: NEGATIVE — SIGNIFICANT CHANGE UP
NRBC # BLD: 0 /100 WBCS — SIGNIFICANT CHANGE UP (ref 0–0)
PH UR: 6 — SIGNIFICANT CHANGE UP (ref 5–8)
PLATELET # BLD AUTO: 232 K/UL — SIGNIFICANT CHANGE UP (ref 150–400)
POTASSIUM SERPL-MCNC: 4.2 MMOL/L — SIGNIFICANT CHANGE UP (ref 3.5–5.3)
POTASSIUM SERPL-MCNC: 4.6 MMOL/L — SIGNIFICANT CHANGE UP (ref 3.5–5.3)
POTASSIUM SERPL-SCNC: 4.2 MMOL/L — SIGNIFICANT CHANGE UP (ref 3.5–5.3)
POTASSIUM SERPL-SCNC: 4.6 MMOL/L — SIGNIFICANT CHANGE UP (ref 3.5–5.3)
PROT SERPL-MCNC: 7.3 G/DL — SIGNIFICANT CHANGE UP (ref 6–8.3)
PROT UR-MCNC: NEGATIVE MG/DL — SIGNIFICANT CHANGE UP
RBC # BLD: 5.38 M/UL — SIGNIFICANT CHANGE UP (ref 4.2–5.8)
RBC # FLD: 15.1 % — HIGH (ref 10.3–14.5)
SODIUM SERPL-SCNC: 135 MMOL/L — SIGNIFICANT CHANGE UP (ref 135–145)
SODIUM SERPL-SCNC: 138 MMOL/L — SIGNIFICANT CHANGE UP (ref 135–145)
SP GR SPEC: 1.02 — SIGNIFICANT CHANGE UP (ref 1–1.03)
UROBILINOGEN FLD QL: 1 MG/DL — SIGNIFICANT CHANGE UP (ref 0.2–1)
WBC # BLD: 6.78 K/UL — SIGNIFICANT CHANGE UP (ref 3.8–10.5)
WBC # FLD AUTO: 6.78 K/UL — SIGNIFICANT CHANGE UP (ref 3.8–10.5)

## 2024-09-19 PROCEDURE — 99223 1ST HOSP IP/OBS HIGH 75: CPT | Mod: GC

## 2024-09-19 PROCEDURE — 99284 EMERGENCY DEPT VISIT MOD MDM: CPT

## 2024-09-19 PROCEDURE — 99285 EMERGENCY DEPT VISIT HI MDM: CPT

## 2024-09-19 RX ORDER — DEXTROSE 15 G/33 G
12.5 GEL IN PACKET (GRAM) ORAL ONCE
Refills: 0 | Status: DISCONTINUED | OUTPATIENT
Start: 2024-09-19 | End: 2024-09-21

## 2024-09-19 RX ORDER — TESTOSTERONE CYPIONATE 200 MG/ML
1 VIAL (ML) INTRAMUSCULAR
Refills: 0 | DISCHARGE

## 2024-09-19 RX ORDER — TESTOSTERONE CYPIONATE 200 MG/ML
50 VIAL (ML) INTRAMUSCULAR DAILY
Refills: 0 | Status: DISCONTINUED | OUTPATIENT
Start: 2024-09-19 | End: 2024-09-21

## 2024-09-19 RX ORDER — DIVALPROEX SODIUM 125 MG/1
500 CAPSULE, DELAYED RELEASE ORAL DAILY
Refills: 0 | Status: DISCONTINUED | OUTPATIENT
Start: 2024-09-19 | End: 2024-09-21

## 2024-09-19 RX ORDER — LEVOTHYROXINE SODIUM 100 MCG
50 TABLET ORAL DAILY
Refills: 0 | Status: DISCONTINUED | OUTPATIENT
Start: 2024-09-19 | End: 2024-09-21

## 2024-09-19 RX ORDER — CHLORHEXIDINE GLUCONATE 40 MG/ML
15 SOLUTION TOPICAL
Refills: 0 | Status: DISCONTINUED | OUTPATIENT
Start: 2024-09-19 | End: 2024-09-21

## 2024-09-19 RX ORDER — SENNA 187 MG
2 TABLET ORAL AT BEDTIME
Refills: 0 | Status: DISCONTINUED | OUTPATIENT
Start: 2024-09-19 | End: 2024-09-21

## 2024-09-19 RX ORDER — DEXTROSE 15 G/33 G
25 GEL IN PACKET (GRAM) ORAL ONCE
Refills: 0 | Status: DISCONTINUED | OUTPATIENT
Start: 2024-09-19 | End: 2024-09-21

## 2024-09-19 RX ORDER — CLONIDINE HCL 0.2 MG
0.3 TABLET ORAL THREE TIMES A DAY
Refills: 0 | Status: DISCONTINUED | OUTPATIENT
Start: 2024-09-19 | End: 2024-09-21

## 2024-09-19 RX ORDER — DEXTROSE 15 G/33 G
15 GEL IN PACKET (GRAM) ORAL ONCE
Refills: 0 | Status: DISCONTINUED | OUTPATIENT
Start: 2024-09-19 | End: 2024-09-21

## 2024-09-19 RX ORDER — FLU VACCINE TS 2012-2013(5YR+) 45MCG/.5ML
0.5 VIAL (ML) INTRAMUSCULAR ONCE
Refills: 0 | Status: DISCONTINUED | OUTPATIENT
Start: 2024-09-19 | End: 2024-09-21

## 2024-09-19 RX ORDER — BUSPIRONE HYDROCHLORIDE 30 MG/1
15 TABLET ORAL
Refills: 0 | Status: DISCONTINUED | OUTPATIENT
Start: 2024-09-19 | End: 2024-09-21

## 2024-09-19 RX ORDER — PANTOPRAZOLE SODIUM 40 MG
40 TABLET, DELAYED RELEASE (ENTERIC COATED) ORAL
Refills: 0 | Status: DISCONTINUED | OUTPATIENT
Start: 2024-09-19 | End: 2024-09-21

## 2024-09-19 RX ORDER — DIVALPROEX SODIUM 125 MG/1
1000 CAPSULE, DELAYED RELEASE ORAL AT BEDTIME
Refills: 0 | Status: DISCONTINUED | OUTPATIENT
Start: 2024-09-19 | End: 2024-09-21

## 2024-09-19 RX ORDER — GLUCAGON INJECTION, SOLUTION 1 MG/.2ML
1 INJECTION, SOLUTION SUBCUTANEOUS ONCE
Refills: 0 | Status: DISCONTINUED | OUTPATIENT
Start: 2024-09-19 | End: 2024-09-21

## 2024-09-19 RX ORDER — MIRTAZAPINE 30 MG
22.5 TABLET ORAL AT BEDTIME
Refills: 0 | Status: DISCONTINUED | OUTPATIENT
Start: 2024-09-19 | End: 2024-09-21

## 2024-09-19 RX ORDER — DIVALPROEX SODIUM 125 MG/1
2 CAPSULE, DELAYED RELEASE ORAL
Refills: 0 | DISCHARGE

## 2024-09-19 RX ORDER — ACETAMINOPHEN 325 MG/1
650 TABLET ORAL EVERY 6 HOURS
Refills: 0 | Status: DISCONTINUED | OUTPATIENT
Start: 2024-09-19 | End: 2024-09-21

## 2024-09-19 RX ORDER — RISPERIDONE 0.25 MG/1
4 TABLET, FILM COATED ORAL
Refills: 0 | Status: DISCONTINUED | OUTPATIENT
Start: 2024-09-19 | End: 2024-09-21

## 2024-09-19 RX ORDER — TAMSULOSIN HYDROCHLORIDE 0.4 MG/1
0.4 CAPSULE ORAL AT BEDTIME
Refills: 0 | Status: DISCONTINUED | OUTPATIENT
Start: 2024-09-19 | End: 2024-09-21

## 2024-09-19 RX ORDER — CHLORHEXIDINE GLUCONATE 40 MG/ML
15 SOLUTION TOPICAL
Refills: 0 | DISCHARGE

## 2024-09-19 RX ADMIN — Medication 2 TABLET(S): at 21:39

## 2024-09-19 RX ADMIN — RISPERIDONE 4 MILLIGRAM(S): 0.25 TABLET, FILM COATED ORAL at 21:41

## 2024-09-19 RX ADMIN — Medication 0.3 MILLIGRAM(S): at 17:37

## 2024-09-19 RX ADMIN — BUSPIRONE HYDROCHLORIDE 15 MILLIGRAM(S): 30 TABLET ORAL at 21:42

## 2024-09-19 RX ADMIN — CHLORHEXIDINE GLUCONATE 15 MILLILITER(S): 40 SOLUTION TOPICAL at 21:40

## 2024-09-19 RX ADMIN — Medication 22.5 MILLIGRAM(S): at 21:42

## 2024-09-19 RX ADMIN — Medication 10 MILLIGRAM(S): at 21:39

## 2024-09-19 RX ADMIN — DIVALPROEX SODIUM 1000 MILLIGRAM(S): 125 CAPSULE, DELAYED RELEASE ORAL at 21:39

## 2024-09-19 RX ADMIN — TAMSULOSIN HYDROCHLORIDE 0.4 MILLIGRAM(S): 0.4 CAPSULE ORAL at 21:38

## 2024-09-19 RX ADMIN — DIVALPROEX SODIUM 500 MILLIGRAM(S): 125 CAPSULE, DELAYED RELEASE ORAL at 14:52

## 2024-09-19 RX ADMIN — Medication 1: at 17:36

## 2024-09-19 RX ADMIN — RISPERIDONE 4 MILLIGRAM(S): 0.25 TABLET, FILM COATED ORAL at 17:37

## 2024-09-19 NOTE — ED ADULT TRIAGE NOTE - CHIEF COMPLAINT QUOTE
pt ambulatory to triage to ED says "I need to be admitted because my group home won't accept me with a mckeon catheter." also on 1:1 at facility due to self arm.

## 2024-09-19 NOTE — H&P ADULT - ATTENDING COMMENTS
Pt is a 33 y/o M with PMHx DM2, HTN, HLD, autism, behavioral disorder requiring inpatient psychiatric admissions and constant 1:1 observation, BPH with urinary retention, hx testicular cancer s/p b/l orchiectomy who presents to the ED with urinary retention, admitted with urinary retention.    HPI as above    T(C): 36.8 (09-19-24 @ 08:28), Max: 36.8 (09-19-24 @ 08:28)  HR: 86 (09-19-24 @ 08:28) (86 - 99)  BP: 160/77 (09-19-24 @ 08:28) (132/93 - 160/77)  RR: 18 (09-19-24 @ 08:28) (17 - 18)  SpO2: 100% (09-19-24 @ 08:28) (98% - 100%)  Wt(kg): --    Physical Exam:   GENERAL: well-groomed, well-developed, NAD  HEENT: head NC/AT; conjunctiva & sclera clear; hearing grossly intact, moist mucous membranes  NECK: supple, no JVD  RESPIRATORY: CTA B/L, no wheezing, rales, rhonchi or rubs  CARDIOVASCULAR: S1&S2, RRR, no murmurs or gallops  ABDOMEN: soft, non-tender, non-distended, + Bowel sounds x4 quadrants, no guarding, rebound or rigidity  MUSCULOSKELETAL:  no clubbing, cyanosis or edema of all 4 extremities  LYMPH: no cervical lymphadenopathy  VASCULAR: Radial pulses 2+ bilaterally, no varicose veins   SKIN: warm and dry, color normal  NEUROLOGIC: AA&O X3    Plan:  continue mckeon catheter, will attempt trial of void  -UA without evidence of UTI  -remainder as above.

## 2024-09-19 NOTE — CARE COORDINATION ASSESSMENT. - OTHER PERTINENT REFERRAL INFORMATION
Sw saw pt and bedside and spoke with caregiver. Pt with intellectual disability and extensive mental health HX.  Pt lives at the [facility] in [place] ([phone]). Pt is mainly indep at the home with his ADL"s but is on a 24hr 1:1 due to high elopement and self harm risk. Pt Has Psycharitris Placido Iglesias [phone] that he sees every 2-3 months. PT admitted due to placement of mckeon that can not be managed at the group home and the patient has been admitted for this before. PT can return to group home after trail and void and mckeon is taken out. Group home will need 24 hr notice and can transport the patient back.

## 2024-09-19 NOTE — ED PROVIDER NOTE - CLINICAL SUMMARY MEDICAL DECISION MAKING FREE TEXT BOX
34-year-old male with history of autism, resides at a group home is presenting with mckeon catheter. pt was seen in ED overnight for urinary retention which has been an intermittent issue for him due to BPH and is on tamsulosin. was dcd back to facility and sent back being told that they cant manage a mckeon in their facility. spoek with pts mother who states that hes been hospitalized for same in the past   will dw case management/social work re dispo   no acute medical complaints

## 2024-09-19 NOTE — ED PROVIDER NOTE - ABNORMAL RHYTHM
Hx of asthma on montelukast 10mg qd, advair, albuterol PRN   on 2L PRN but has been on 2L since d/c on 8/16 (was admitted for asthma exacerbation iso of Influenza)  c/w home meds
sinus tachycardia

## 2024-09-19 NOTE — H&P ADULT - PROBLEM SELECTOR PLAN 8
Hx testicular cancer s/p b/l orchiectomy  - continue home testosterone gel SCD's SCD's - IMPROVE score 0

## 2024-09-19 NOTE — H&P ADULT - PROBLEM SELECTOR PLAN 4
Chronic  - takes metformin at home  - hold home medications  - start SINA with FS qac/qhs  - hypoglycemia protocol  - [facility] diet  - f/u AM A1c Chronic  - continue home statin  - f/u lipid panel

## 2024-09-19 NOTE — H&P ADULT - PROBLEM SELECTOR PLAN 3
Pt with history of self-injurious behavior, bipolar disorder, HAVEN, inpatient psychiatric admissions for SI  - requires constant observation  - takes risperidone, mirtazapine, buspar at home  - continue home medication regimen Chronic  - takes metformin at home  - hold home medications  - start SINA with FS qac/qhs  - hypoglycemia protocol  - [facility] diet  - f/u AM A1c

## 2024-09-19 NOTE — CARE COORDINATION ASSESSMENT. - NSPASTMEDSURGHISTORY_GEN_ALL_CORE_FT
PAST MEDICAL & SURGICAL HISTORY:  Intellectual disability      Obesity      Factitious disorder      GERD (gastroesophageal reflux disease)      Asthma      Urinary retention      Myopia of both eyes      Enuresis      Autism      History of BPH      Hypothyroid      HLD (hyperlipidemia)      Impulse control disorder      Testicular cancer      Type 2 diabetes mellitus      History of orchiectomy      Autism

## 2024-09-19 NOTE — H&P ADULT - NSHPSOCIALHISTORY_GEN_ALL_CORE
Lives: group home  ADLs: independent  Diet: no restrictions  Alcohol Use: denies  Tobacco Use: denies  Recreational Drug Use: denies

## 2024-09-19 NOTE — PATIENT PROFILE ADULT - FUNCTIONAL ASSESSMENT - DAILY ACTIVITY 4.
Does patient have a Guardian or POA: No  Procedure: Colonoscopy  Diagnosis: Blood in stools K92.1, Family history colonic polyps Z83.719  Prep: Miralax    Previous scope: 10 years ago [place]  Allergies:  ALLERGIES:  Patient has no known allergies.    Medication Review:   Anticoagulants: This patient does not have an active medication from one of the medication groupers.   NSAIDS: No   Diabetic Medications: None  Phentermine: No   Antihypertensives: No   Iron Supplement:  No   Fish Oil:  No     Medical History Review:  Cardiac Stent Placement: No  Pacemaker NO  Defibrillator: No  Spinal Cord Stimulator: No   BMI over 40: No  Estimated body mass index is 25.71 kg/m² as calculated from the following:    Height as of 5/23/23: 5' 3.25\" (1.607 m).    Weight as of an earlier encounter on 11/3/23: 66.4 kg (146 lb 4.8 oz).   Sleep Apnea: No     Family History: Colon Cancer  No    Colonoscopy scheduled per protocol and instructions reviewed, patient verbalized understanding. The patient has been instructed to hold certain medications prior to this procedure and to check with prescribing provider to make sure it would be ok to hold as stated in instructions listed in letter tab.    Procedure Date: 12/14/2023  Procedure Time: 1030  Arrival Time: 930  Delivery Instructions: Via Many[facility] Harry and Via Mail             3 = A little assistance

## 2024-09-19 NOTE — H&P ADULT - NSHPPHYSICALEXAM_GEN_ALL_CORE
T(C): 36.8 (09-19-24 @ 08:28), Max: 36.8 (09-19-24 @ 08:28)  HR: 86 (09-19-24 @ 08:28) (86 - 99)  BP: 160/77 (09-19-24 @ 08:28) (132/93 - 160/77)  RR: 18 (09-19-24 @ 08:28) (17 - 18)  SpO2: 100% (09-19-24 @ 08:28) (98% - 100%)    GENERAL: patient appears well, no acute distress, appropriately interactive  HEENT: NCAT, EOMI, sclera clear  LUNGS: good air entry bilaterally, clear to auscultation, symmetric breath sounds, no wheezing or rhonchi appreciated  HEART: S1/S2, regular rate and rhythm, no murmurs noted, trace pitting edema b/l  GASTROINTESTINAL: +suprapubic tenderness, abdomen otherwise nontender, nondistended, normoactive bowel sounds  INTEGUMENT: warm skin, appears well perfused  MUSCULOSKELETAL: no clubbing or cyanosis, no obvious deformity  NEUROLOGIC: answers questions and follows commands appropriately  HEME/LYMPH: no obvious ecchymosis or petechiae

## 2024-09-19 NOTE — ED ADULT NURSE NOTE - CAS EDP DISCH DISPOSITION ADMI
[facility] Follow Up Office Note     Encounter Date:20  Patient:Della Vines  :1947  MRN:[id number]      Chief Complaint:   Chief Complaint   Patient presents with   • Coronary Artery Disease     1 month f/u    • Hypertension         History of Presenting Illness:      Ms. Vines is a 72-year-old woman with past medical history notable for coronary artery disease status post percutaneous intervention, TIAs, hypertension, mixed hyperlipidemia, and chronic lung disease who presents to my office for follow-up after recent cardiac catheterization and percutaneous intervention.  Patient was initially seen by me back at the end of last year for an initial evaluation regarding labile blood pressure and chest discomfort.  She underwent further testing and had an abnormal stress test finding and underwent further evaluation with a cardiac catheterization.  This demonstrated significant disease in the LAD and underwent percutaneous intervention.  Since that time she has been doing quite well denies any recurrent chest pain symptoms.  Overall she is feeling much better.  Her blood pressure is more stable.  She does have baseline bradycardia but this is unchanged from before.  She is working in cardiac rehab and tolerating this well and also doing some exercise outside of cardiac rehab.  Overall she is feeling much better.  She is tolerating her dual antiplatelet therapy without any significant bleeding.      Review of Systems:  Review of Systems   Constitution: Positive for malaise/fatigue.   HENT: Negative.    Eyes: Negative.    Cardiovascular: Negative.    Respiratory: Negative.    Endocrine: Negative.    Hematologic/Lymphatic: Negative.    Skin: Negative.    Musculoskeletal: Negative.    Gastrointestinal: Negative.    Genitourinary: Negative.    Neurological: Negative.    Psychiatric/Behavioral: Negative.    Allergic/Immunologic: Negative.        Current Outpatient Medications on File Prior to Visit    Medication Sig Dispense Refill   • amLODIPine (NORVASC) 5 MG tablet Take 1 tablet by mouth Daily. 90 tablet 3   • aspirin 81 MG EC tablet Take 1 tablet by mouth Daily. 90 tablet 3   • atorvastatin (LIPITOR) 40 MG tablet Take 1 tablet by mouth Daily. 30 tablet 11   • calcium carbonate (OS-JERRELL) 1250 (500 Ca) MG tablet Take 1 tablet by mouth.     • Cholecalciferol 2000 units tablet Take 4,000 Int'l Units by mouth.     • clopidogrel (PLAVIX) 75 MG tablet Take 1 tablet by mouth Daily. 90 tablet 3   • metoprolol succinate XL (TOPROL-XL) 50 MG 24 hr tablet TAKE 1 TABLET BY MOUTH EVERY DAY 90 tablet 3   • nitroglycerin (NITROSTAT) 0.4 MG SL tablet Place 1 tablet under the tongue Every 5 (Five) Minutes As Needed for Chest Pain. Place one tablet under tongue as needed for chest pain. 25 tablet 3   • pantoprazole (PROTONIX) 40 MG EC tablet TAKE 1 TABLET BY MOUTH EVERY MORNING 90 tablet 3   • topiramate (TOPAMAX) 25 MG tablet TK 1 T PO BID  3     No current facility-administered medications on file prior to visit.        Allergies   Allergen Reactions   • Codeine    • Erythromycin Diarrhea     Side effect    • Iodine Hives   • Lisinopril Other (See Comments)     COUGH   • Morphine Itching and Delirium   • Moxifloxacin      Elevated liver enzymes   • Sulfa Antibiotics    • Pneumococcal Vaccines Swelling and Rash       Past Medical History:   Diagnosis Date   • Abnormal stress test 1/13/2020    Added automatically from request for surgery [id number]   • Acid reflux disease    • Bell's palsy    • Cerebellar infarction (CMS/HCC)    • Chest pain    • Hyperlipidemia    • Hypertension    • Lung disease    • Osteoporosis    • PAF (paroxysmal atrial fibrillation) (CMS/HCC)    • Stroke (CMS/HCC)    • TIA (transient ischemic attack)    • Urinary tract infection        Past Surgical History:   Procedure Laterality Date   • ADENOIDECTOMY     • APPENDECTOMY     • BREAST FIBROADENOMA SURGERY     • CARDIAC CATHETERIZATION N/A 1/17/2020     Procedure: Left Heart Cath;  Surgeon: Kingsley Zimmerman MD;  Location:  [facility];  Service: Cardiovascular   • CARDIAC CATHETERIZATION N/A 2020    Procedure: Coronary angiography;  Surgeon: Kingsley Zimmerman MD;  Location:  [facility];  Service: Cardiovascular   • CARDIAC CATHETERIZATION N/A 2020    Procedure: Left ventriculography;  Surgeon: Kingsley Zimmerman MD;  Location:  [facility];  Service: Cardiovascular   • CARDIAC CATHETERIZATION N/A 2020    Procedure: Percutaneous Coronary Intervention;  Surgeon: Kingsley Zimmerman MD;  Location:  [facility];  Service: Cardiovascular   • CARDIAC CATHETERIZATION N/A 2020    Procedure: Stent GERA coronary;  Surgeon: Kingsley Zimmerman MD;  Location:  [facility];  Service: Cardiovascular   • HYSTERECTOMY     • MASTOID SURGERY     • TONSILLECTOMY         Social History     Socioeconomic History   • Marital status:      Spouse name: Tyrone Vines   • Number of children: 1   • Years of education: 14   • Highest education level: Not on file   Occupational History   • Occupation: retired chemical dependency counselor   Tobacco Use   • Smoking status: Former Smoker     Packs/day: 1.50     Years: 30.00     Pack years: 45.00     Start date:      Last attempt to quit:      Years since quittin.   • Smokeless tobacco: Never Used   • Tobacco comment: caffeine use: 1 CUP COFFEE DAILY    Substance and Sexual Activity   • Alcohol use: No   • Drug use: No   • Sexual activity: Defer       Family History   Problem Relation Age of Onset   • Heart failure Father    • Hypertension Father    • Heart disease Father    • Breast cancer Mother    • Aneurysm Daughter        The following portions of the patient's history were reviewed and updated as appropriate: allergies, current medications, past family history, past medical history, past social history, past surgical  "history and problem list.       Objective:       Vitals:    02/26/20 1433   BP: 112/68   BP Location: Right arm   Patient Position: Sitting   Pulse: 52   Weight: 52.2 kg (115 lb)   Height: 160 cm (63\")         Physical Exam:  Constitutional: Well appearing, well developed, no acute distress   HENT: Oropharynx clear and membrane moist  Eyes: Normal conjunctiva, no sclera icterus.  Neck: Supple, no carotid bruit bilaterally.  Cardiovascular: Bradycardic rate and regular rhythm, no murmur, no lower extremity edema.  Pulmonary: Normal respiratory effort, no lung sounds, no wheezing.  Abdominal: Soft, nontender, no hepatosplenomegaly, liver is non-pulsatile.  Neurological: Alert and orient x 3.   Skin: Warm, dry, no ecchymosis, no rash.  Psych: Appropriate mood and affect. Normal judgment and insight.       Lab Results   Component Value Date    GLUCOSE 87 01/18/2020    BUN 18 02/13/2020    CREATININE 0.7 02/13/2020    EGFRIFNONA 100 01/18/2020    BCR 27.0 (H) 02/13/2020    K 4.4 02/13/2020    CO2 23 02/13/2020    CALCIUM 10.2 02/13/2020    ALBUMIN 4.2 02/13/2020    LABIL2 1.4 02/13/2020    AST 23 02/13/2020    ALT 17 02/13/2020       Lab Results   Component Value Date    WBC 6.70 02/13/2020    HGB 14.0 02/13/2020    HCT 44.0 02/13/2020    MCV 93.0 02/13/2020     02/13/2020       Lab Results   Component Value Date    CHLPL 120 02/13/2020    CHLPL 138 11/07/2019    CHLPL 191 04/25/2019     Lab Results   Component Value Date    TRIG 100 02/13/2020    TRIG 121 11/07/2019    TRIG 185 (H) 04/25/2019     Lab Results   Component Value Date    HDL 41 (L) 02/13/2020    HDL 39 (L) 11/07/2019    HDL 40 (L) 04/25/2019     Lab Results   Component Value Date    LDL 59 02/13/2020    LDL 75 11/07/2019     (H) 04/25/2019           ECG 12 Lead  Date/Time: 2/26/2020 2:57 PM  Performed by: Kingsley Zimmerman MD  Authorized by: Knigsley Zimmerman MD   Comparison: compared with previous ECG from 1/27/2020  Similar to previous " "ECG  Rhythm: sinus rhythm    Clinical impression: normal ECG        Cardiac Catheterization 1/17/2020:  · Severe single-vessel coronary artery disease with a long stenoses involving the proximal to mid LAD at 90%.  There is 40% ostial circumflex stenoses and scattered 20% stenoses in the RCA.  · Normal left ventricular function at 60% with a normal left ventricular filling pressure 11 mmHg.  · Successful revascularization of proximal to mid LAD with placement of overlapping 2.25 x 33 mm and 2.25 x 8 mm Xience drug-eluting stents with the mid and proximal segments postdilated with a noncompliant 2.5 mm balloon to 16 roland.    Mobile Telemetry 1/16/2020:  · A normal monitor study.  · Underlying heart rhythm was sinus with an average rate of 86 bpm no significant arrhythmias noted during her study    Stress MPI 1/7/2020:  · Patient complained of 8/10 chest pressure and \"burning sensation\" with exertion  · Myocardial perfusion imaging indicates a normal myocardial perfusion study with no evidence of ischemia.  · Left ventricular ejection fraction is severely reduced (Calculated EF = 22%).  · The gating does not appear to accurate on this study. Would recommend correlation with echocardiogram.    Carotid Duplex 1/7/2020:  · Distal right internal carotid artery mild stenosis.  · Mid left internal carotid artery mild stenosis.     Assessment:          Diagnosis Plan   1. Coronary artery disease involving native coronary artery of native heart without angina pectoris     2. Essential hypertension     3. Mixed hyperlipidemia            Plan:       Ms. Vines is a 72-year-old woman with past medical history notable for coronary artery disease status post percutaneous intervention, TIAs, hypertension, mixed hyperlipidemia, and chronic lung disease who presents to my office for follow-up after recent cardiac catheterization and percutaneous intervention.  Overall she is doing quite well and recovering nicely.  No changes were " needed with her medical regimen.  I would plan on seeing her back in 6 months.  She does have a slight murmur on exam which may be more of a flow murmur or aortic sclerosis is no significant gradient was noted on her cardiac catheterization.  At some point a baseline echocardiogram would be reasonable would like to see her recover a little bit further before repeating an echocardiogram.      Coronary artery disease without angina:  · Continue dual antiplatelet therapy  · Continue statin  · Continue beta-blocker      Hypertension:  · Blood pressure is much better controlled after optimizing regimen and percutaneous intervention.       Mixed hyperlipidemia:  · Patient currently on statin therapy     History of CVA:  · Continue aspirin and statin      Follow-up:  6 months      Kingsley Zimmerman MD  [facility]  02/26/20  2:53 PM      [facility]

## 2024-09-19 NOTE — H&P ADULT - PROBLEM SELECTOR PLAN 1
Pt presenting with urinary retention, had mckeon placed in ED earlier this AM and unable to be managed at his group home  - VS stable on arrival  - Labs grossly WNL  - UA negative  - admit to medicine for urinary retention requiring mckeon placement  - pt with 1 year + of intermittent retention, follows with urology Dr. Raymond  - continue home tamsulosin  - plan for TOV tomorrow  - monitor I&Os, renal function - Cr currently at baseline per chart review

## 2024-09-19 NOTE — CARE COORDINATION ASSESSMENT. - NSCAREPROVIDERS_GEN_ALL_CORE_FT
(1) More than 48 hours/None
CARE PROVIDERS:  Accepting Physician: Steve Grimes  Administration: Sisi Magdaleno  Administration: Bhavana Babcock  Admitting: Steve Grimes  Attending: Steve Grimes  ED Attending: Radha Mc  ED Nurse: Patrizia Ellsworth  Nurse: Pretty Melgar  Oncology: Tracey Johnston  Outpatient Provider: Marco Paul  PCA/Nursing Assistant: Shaista Tanner  PCA/Nursing Assistant: Tanvir Gill  Primary Team: Radha Mc  Primary Team: Adonay Mcneill  Primary Team: Swati Holliday  : Jackie Steven  UR// Supp. Assoc.: Meryl Fuentes  UR// Supp. Assoc.: Ngoc Worthington

## 2024-09-19 NOTE — H&P ADULT - ASSESSMENT
Pt is a 33 y/o M with PMHx DM2, HTN, HLD, autism, behavioral disorder requiring inpatient psychiatric admissions and constant 1:1 observation, BPH with urinary retention, hx testicular cancer s/p b/l orchiectomy who presents to the ED with urinary retention, admitted with urinary retention.

## 2024-09-19 NOTE — ED PROVIDER NOTE - OBJECTIVE STATEMENT
34-year-old male with history of autism, resides at a group home is presenting with mckeon catheter. pt was seen in ED overnight for urinary retention which has been an intermittent issue for him due to BPH and is on tamsulosin. was dcd back to facility and sent back being told that they cant manage a mckeon in their facility. spoek with pts mother who states that hes been hospitalized for same in the past

## 2024-09-19 NOTE — H&P ADULT - PROBLEM SELECTOR PLAN 6
Chronic  - takes clonidine at home  - continue home clonidine with hold parameters Chronic  - continue levothyroxine  - f/u AM TSH

## 2024-09-19 NOTE — H&P ADULT - PROBLEM SELECTOR PLAN 7
Chronic  - continue levothyroxine  - f/u AM TSH Hx testicular cancer s/p b/l orchiectomy  - continue home testosterone gel

## 2024-09-19 NOTE — H&P ADULT - NSHPREVIEWOFSYSTEMS_GEN_ALL_CORE
CONSTITUTIONAL: denies fever, chills, fatigue, weakness  HEENT: denies blurred vision, sore throat  CARDIOVASCULAR: denies chest pain, chest pressure, palpitations  RESPIRATORY: denies shortness of breath, sputum production  GASTROINTESTINAL: denies nausea, vomiting, diarrhea, abdominal pain  GENITOURINARY: +urinary retention +dysuria  NEUROLOGICAL: denies numbness, headache, focal weakness  MUSCULOSKELETAL: denies new joint pain, muscle aches  HEMATOLOGIC: denies gross bleeding, bruising

## 2024-09-19 NOTE — ED PROVIDER NOTE - CARE PLAN
Principal Discharge DX:	Urinary retention   1 Doxepin Counseling:  Patient advised that the medication is sedating and not to drive a car after taking this medication. Patient informed of potential adverse effects including but not limited to dry mouth, urinary retention, and blurry vision.  The patient verbalized understanding of the proper use and possible adverse effects of doxepin.  All of the patient's questions and concerns were addressed.

## 2024-09-19 NOTE — H&P ADULT - PROBLEM SELECTOR PLAN 2
Pt with hx ?seizure disorder  - takes depakote 1000mg at bedtime and 500mg at 2pm  - levels WNL last week, will check levels in AM  - continue with home medication regimen Patient is not sure why he takes depakote, he is unsure if he has a history of seizures.     - takes depakote 1000mg at bedtime and 500mg at 2pm  - levels WNL last week, will check levels in AM  - continue with home medication regimen Pt with history of self-injurious behavior, bipolar disorder, HAVEN, inpatient psychiatric admissions for SI  - requires constant observation  - takes risperidone, mirtazapine, buspar, depakote at home  - continue home medication regimen  - continue 1:1

## 2024-09-19 NOTE — H&P ADULT - PROBLEM SELECTOR PLAN 5
Chronic  - continue home statin  - f/u lipid panel Chronic  - takes clonidine at home  - continue home clonidine with hold parameters

## 2024-09-19 NOTE — H&P ADULT - HISTORY OF PRESENT ILLNESS
Pt is a 35 y/o M with PMHx DM2, HTN, HLD, autism, behavioral disorder requiring inpatient psychiatric admissions and constant 1:1 observation, BPH with urinary retention, hx testicular cancer s/p b/l orchiectomy who presents to the ED with urinary retention. Pt presented to the ED earlier this morning and had a mckeon placed. He was discharged to his group home however they were unable to manage foleys at the group home so he was sent back in to be admitted. Pt notes he has been having issues with urinary retention, difficulty urinating, and dysuria for the past year. He's c/o some minor lower abdominal pain occasionally which is worse with movement. He denies any hematuria, fever, chills, fatigue, lightheadedness, nausea, vomiting, diarrhea, LE edema.    ED Course:   Vitals: BP: 160/77, HR: 86, Temp: 98.2, RR: 18, SpO2: 100% on RA  Labs:  Glucose 134  UA: negative  Received in the ED: NA   Pt is a 35 y/o M with PMHx DM2, HTN, HLD, autism, behavioral disorder requiring inpatient psychiatric admissions and constant 1:1 observation, BPH with urinary retention, hx testicular cancer s/p b/l orchiectomy who presents to the ED with urinary retention. Pt presented to the ED earlier this morning and had a mckeon placed. He was discharged to his group home however they were unable to manage foleys at the group home so he was sent back in to be admitted. Pt notes he has been having issues with urinary retention, difficulty urinating, and dysuria for the past year. He's c/o some minor lower abdominal pain occasionally which is worse with movement. He denies any hematuria, fever, chills, fatigue, lightheadedness, nausea, vomiting, diarrhea, LE edema. He has been seen by urologist Dr. Sarah in the past.     ED Course:   Vitals: BP: 160/77, HR: 86, Temp: 98.2, RR: 18, SpO2: 100% on RA  Labs:  Glucose 134  UA: negative  Received in the ED: NA

## 2024-09-20 LAB
A1C WITH ESTIMATED AVERAGE GLUCOSE RESULT: 7.3 % — HIGH (ref 4–5.6)
ANION GAP SERPL CALC-SCNC: 5 MMOL/L — SIGNIFICANT CHANGE UP (ref 5–17)
BUN SERPL-MCNC: 19 MG/DL — SIGNIFICANT CHANGE UP (ref 7–23)
CALCIUM SERPL-MCNC: 9.5 MG/DL — SIGNIFICANT CHANGE UP (ref 8.5–10.1)
CHLORIDE SERPL-SCNC: 107 MMOL/L — SIGNIFICANT CHANGE UP (ref 96–108)
CHOLEST SERPL-MCNC: 197 MG/DL — SIGNIFICANT CHANGE UP
CO2 SERPL-SCNC: 27 MMOL/L — SIGNIFICANT CHANGE UP (ref 22–31)
CREAT SERPL-MCNC: 0.96 MG/DL — SIGNIFICANT CHANGE UP (ref 0.5–1.3)
EGFR: 106 ML/MIN/1.73M2 — SIGNIFICANT CHANGE UP
ESTIMATED AVERAGE GLUCOSE: 163 MG/DL — HIGH (ref 68–114)
GLUCOSE SERPL-MCNC: 127 MG/DL — HIGH (ref 70–99)
HCT VFR BLD CALC: 43.2 % — SIGNIFICANT CHANGE UP (ref 39–50)
HDLC SERPL-MCNC: 32 MG/DL — LOW
HGB BLD-MCNC: 14.1 G/DL — SIGNIFICANT CHANGE UP (ref 13–17)
LIPID PNL WITH DIRECT LDL SERPL: 137 MG/DL — HIGH
MCHC RBC-ENTMCNC: 25 PG — LOW (ref 27–34)
MCHC RBC-ENTMCNC: 32.6 GM/DL — SIGNIFICANT CHANGE UP (ref 32–36)
MCV RBC AUTO: 76.5 FL — LOW (ref 80–100)
NON HDL CHOLESTEROL: 165 MG/DL — HIGH
NRBC # BLD: 0 /100 WBCS — SIGNIFICANT CHANGE UP (ref 0–0)
PLATELET # BLD AUTO: 229 K/UL — SIGNIFICANT CHANGE UP (ref 150–400)
POTASSIUM SERPL-MCNC: 4.4 MMOL/L — SIGNIFICANT CHANGE UP (ref 3.5–5.3)
POTASSIUM SERPL-SCNC: 4.4 MMOL/L — SIGNIFICANT CHANGE UP (ref 3.5–5.3)
RBC # BLD: 5.65 M/UL — SIGNIFICANT CHANGE UP (ref 4.2–5.8)
RBC # FLD: 14.8 % — HIGH (ref 10.3–14.5)
SODIUM SERPL-SCNC: 139 MMOL/L — SIGNIFICANT CHANGE UP (ref 135–145)
TRIGL SERPL-MCNC: 156 MG/DL — HIGH
TSH SERPL-MCNC: 3.59 UIU/ML — SIGNIFICANT CHANGE UP (ref 0.36–3.74)
VALPROATE SERPL-MCNC: 60 UG/ML — SIGNIFICANT CHANGE UP (ref 50–100)
WBC # BLD: 6.28 K/UL — SIGNIFICANT CHANGE UP (ref 3.8–10.5)
WBC # FLD AUTO: 6.28 K/UL — SIGNIFICANT CHANGE UP (ref 3.8–10.5)

## 2024-09-20 PROCEDURE — 99233 SBSQ HOSP IP/OBS HIGH 50: CPT

## 2024-09-20 RX ADMIN — Medication 10 MILLIGRAM(S): at 21:27

## 2024-09-20 RX ADMIN — Medication 40 MILLIGRAM(S): at 06:26

## 2024-09-20 RX ADMIN — CHLORHEXIDINE GLUCONATE 15 MILLILITER(S): 40 SOLUTION TOPICAL at 06:25

## 2024-09-20 RX ADMIN — Medication 1: at 12:02

## 2024-09-20 RX ADMIN — Medication 2 TABLET(S): at 21:26

## 2024-09-20 RX ADMIN — BUSPIRONE HYDROCHLORIDE 15 MILLIGRAM(S): 30 TABLET ORAL at 21:27

## 2024-09-20 RX ADMIN — RISPERIDONE 4 MILLIGRAM(S): 0.25 TABLET, FILM COATED ORAL at 13:36

## 2024-09-20 RX ADMIN — RISPERIDONE 4 MILLIGRAM(S): 0.25 TABLET, FILM COATED ORAL at 21:26

## 2024-09-20 RX ADMIN — DIVALPROEX SODIUM 1000 MILLIGRAM(S): 125 CAPSULE, DELAYED RELEASE ORAL at 21:26

## 2024-09-20 RX ADMIN — DIVALPROEX SODIUM 500 MILLIGRAM(S): 125 CAPSULE, DELAYED RELEASE ORAL at 13:36

## 2024-09-20 RX ADMIN — Medication 50 MICROGRAM(S): at 06:26

## 2024-09-20 RX ADMIN — TAMSULOSIN HYDROCHLORIDE 0.4 MILLIGRAM(S): 0.4 CAPSULE ORAL at 21:26

## 2024-09-20 RX ADMIN — Medication 22.5 MILLIGRAM(S): at 21:26

## 2024-09-20 RX ADMIN — Medication 50 MILLIGRAM(S): at 11:52

## 2024-09-20 RX ADMIN — Medication 0.3 MILLIGRAM(S): at 13:36

## 2024-09-20 RX ADMIN — Medication 10 MILLIGRAM(S): at 11:45

## 2024-09-20 NOTE — SOCIAL WORK PROGRESS NOTE - NSSWPROGRESSNOTE_GEN_ALL_CORE
Lyn called [facility] [phone] to notify them of Patients discharge for monday and to ask for Fax number so that DC summary could be sent. NO one answered the phone and a message was left with LYN call back number. Lyn will cont to follow.

## 2024-09-21 ENCOUNTER — TRANSCRIPTION ENCOUNTER (OUTPATIENT)
Age: 34
End: 2024-09-21

## 2024-09-21 VITALS
OXYGEN SATURATION: 94 % | RESPIRATION RATE: 18 BRPM | DIASTOLIC BLOOD PRESSURE: 88 MMHG | WEIGHT: 302.47 LBS | HEART RATE: 80 BPM | TEMPERATURE: 99 F | SYSTOLIC BLOOD PRESSURE: 119 MMHG

## 2024-09-21 PROCEDURE — 85027 COMPLETE CBC AUTOMATED: CPT

## 2024-09-21 PROCEDURE — 81003 URINALYSIS AUTO W/O SCOPE: CPT

## 2024-09-21 PROCEDURE — 84443 ASSAY THYROID STIM HORMONE: CPT

## 2024-09-21 PROCEDURE — 51702 INSERT TEMP BLADDER CATH: CPT

## 2024-09-21 PROCEDURE — 99285 EMERGENCY DEPT VISIT HI MDM: CPT | Mod: 25

## 2024-09-21 PROCEDURE — 85025 COMPLETE CBC W/AUTO DIFF WBC: CPT

## 2024-09-21 PROCEDURE — 99239 HOSP IP/OBS DSCHRG MGMT >30: CPT

## 2024-09-21 PROCEDURE — 36415 COLL VENOUS BLD VENIPUNCTURE: CPT

## 2024-09-21 PROCEDURE — 83036 HEMOGLOBIN GLYCOSYLATED A1C: CPT

## 2024-09-21 PROCEDURE — 80164 ASSAY DIPROPYLACETIC ACD TOT: CPT

## 2024-09-21 PROCEDURE — 99283 EMERGENCY DEPT VISIT LOW MDM: CPT | Mod: 25

## 2024-09-21 PROCEDURE — 80061 LIPID PANEL: CPT

## 2024-09-21 PROCEDURE — 80048 BASIC METABOLIC PNL TOTAL CA: CPT

## 2024-09-21 PROCEDURE — 80053 COMPREHEN METABOLIC PANEL: CPT

## 2024-09-21 PROCEDURE — 82962 GLUCOSE BLOOD TEST: CPT

## 2024-09-21 RX ADMIN — Medication 50 MILLIGRAM(S): at 11:17

## 2024-09-21 RX ADMIN — Medication 10 MILLIGRAM(S): at 11:17

## 2024-09-21 RX ADMIN — Medication 50 MICROGRAM(S): at 05:58

## 2024-09-21 RX ADMIN — Medication 0.3 MILLIGRAM(S): at 06:01

## 2024-09-21 RX ADMIN — Medication 40 MILLIGRAM(S): at 05:58

## 2024-09-21 NOTE — DISCHARGE NOTE PROVIDER - NSDCMRMEDTOKEN_GEN_ALL_CORE_FT
atorvastatin 10 mg oral tablet: 1 tab(s) orally once a day (in the evening)  busPIRone 15 mg oral tablet: 1 tab(s) orally once a day (in the evening)  chlorhexidine 0.12% mucous membrane liquid: 15 milliliter(s) orally 2 times a day  cloNIDine 0.3 mg oral tablet: 1 tab(s) orally 3 times a day  divalproex sodium 500 mg oral tablet, extended release: 2 tab(s) orally once a day (at bedtime)  divalproex sodium 500 mg oral tablet, extended release: 1 tab(s) orally once a day (in the afternoon) at 2pm  levothyroxine 50 mcg (0.05 mg) oral tablet: 1 tab(s) orally once a day  loratadine 10 mg oral tablet: 1 tab(s) orally once a day (in the morning)  metFORMIN 1000 mg oral tablet: 1 tab(s) orally 2 times a day (with meals)  mirtazapine 15 mg oral tablet: 1.5 tab(s) orally once a day (at bedtime)  Protonix 20 mg oral delayed release tablet: 1 tab(s) orally once a day  risperiDONE 4 mg oral tablet: 1 tab(s) orally 2 times a day at 2pm &amp; 8pm  senna (sennosides) 8.6 mg oral tablet: 2 tab(s) orally once a day (at bedtime)  tamsulosin 0.4 mg oral capsule: 1 cap(s) orally once a day (at bedtime)  testosterone 50 mg/5 g (1%) transdermal gel: 1 packet(s) transdermally once a day

## 2024-09-21 NOTE — SOCIAL WORK PROGRESS NOTE - NSSWPROGRESSNOTE_GEN_ALL_CORE
PT ready for DC as per MD. KEKE confirmed with Luis at pts group home that thye can accept back today, they will  pt this afternoon. KEKE asked if DC summary needs to be faxed anywhere and as per Luis the group home staff at bedside will accept dc ppwk. SW remains available.

## 2024-09-21 NOTE — DISCHARGE NOTE PROVIDER - HOSPITAL COURSE
35 y/o M with PMHx DM2, HTN, HLD, autism, behavioral disorder requiring inpatient psychiatric admissions and constant 1:1 observation, BPH with urinary retention, hx testicular cancer s/p b/l orchiectomy who presents to the ED with urinary retention. Pt presented to the ED earlier this morning and had a mckeon placed. He was discharged to his group home however they were unable to manage foleys at the group home so he was sent back in to be admitted. Pt notes he has been having issues with urinary retention, difficulty urinating, and dysuria for the past year. He's c/o some minor lower abdominal pain occasionally which is worse with movement. He denies any hematuria, fever, chills, fatigue, lightheadedness, nausea, vomiting, diarrhea, LE edema. He has been seen by urologist Dr. Sarah in the past.     ED Course:   Vitals: BP: 160/77, HR: 86, Temp: 98.2, RR: 18, SpO2: 100% on RA  Labs:  Glucose 134  UA: negative  Received in the ED: NA   (19 Sep 2024 12:13)      Mckeon removed on 9/20. passed TOV. pt has been urinating. Pt hemodynamically stable and deemed stable for discharge to group home. Out pt follow up with urologist

## 2024-09-21 NOTE — DISCHARGE NOTE NURSING/CASE MANAGEMENT/SOCIAL WORK - PATIENT PORTAL LINK FT
You can access the FollowMyHealth Patient Portal offered by [facility] by registering at the following website: [email or web address]. By joining [facility]’s FollowMyHealth portal, you will also be able to view your health information using other applications (apps) compatible with our system.

## 2024-09-21 NOTE — DISCHARGE NOTE PROVIDER - DISCHARGE DATE
----- Message from Darío Cummins MD sent at 2/17/2018  9:04 AM CST -----  Please follow up with patient concerning recent visit for pharyngitis. Notify him (or his parent) that his urine culture returned positive for a bacteria but showed sensitivity to the antibiotics started. Advised patient to complete antibiotics as started.  
21-Sep-2024

## 2024-09-21 NOTE — DISCHARGE NOTE PROVIDER - NSDCFUSCHEDAPPT_GEN_ALL_CORE_FT
[facility]  GASTRO [address]  Scheduled Appointment: 09/24/2024    [facility]  ORTHOSURG [address]  Scheduled Appointment: 09/27/2024    Santiago Das  [facility]  [facility]  Scheduled Appointment: 10/11/2024    Mary Jane Peacock  [facility]  OTOLARYNG  [facility]  Scheduled Appointment: 10/21/2024

## 2024-09-21 NOTE — PROGRESS NOTE ADULT - ASSESSMENT
33 y/o M with PMHx DM2, HTN, HLD, autism, behavioral disorder requiring inpatient psychiatric admissions and constant 1:1 observation, BPH with urinary retention, hx testicular cancer s/p b/l orchiectomy who presents to the ED with urinary retention, admitted with urinary retention.    Urinary retention   BPH    - on flomax. per mother has been on flomax 0.8mg in the past but was decreased.     - outpt urologist is Dr Marco Paul. called made out to patient's primary urologist.     - mckeon removed, passed TOV. urinating well. denies abdominal pain, dysuria     type 2 DM    - cont LDISS    Autism     - cont buspar, depakote, remeron, risperdal     hypothyroidism     - cont synthroid     DVT proph: continue ambulation   cont ambulation and hydration. UA neg.     DISPO: group home
33 y/o M with PMHx DM2, HTN, HLD, autism, behavioral disorder requiring inpatient psychiatric admissions and constant 1:1 observation, BPH with urinary retention, hx testicular cancer s/p b/l orchiectomy who presents to the ED with urinary retention, admitted with urinary retention.    Urinary retention   BPH    - on flomax. per mother has been on flomax 0.8mg in the past but was decreased.     - outpt urologist is Dr Marco Paul. called made out to patient's primary urologist.     - for TOV today. encouraged ambulation and po intake.     type 2 DM    - cont LDISS    Autism     - cont buspar, depakote, remeron, risperdal     hypothyroidism     - cont synthroid     DVT proph: continue ambulation   updated mother over the phone.   TOV today   cont ambulation and hydration. UA neg.

## 2024-09-21 NOTE — DISCHARGE NOTE NURSING/CASE MANAGEMENT/SOCIAL WORK - NSDCPEFALRISK_GEN_ALL_CORE
For information on Fall & Injury Prevention, visit: [email or web address] OR  [email or web address] OR  https://www.cdc.gov/steadi/patient.html

## 2024-09-21 NOTE — DISCHARGE NOTE NURSING/CASE MANAGEMENT/SOCIAL WORK - NSDCVIVACCINE_GEN_ALL_CORE_FT
Tdap; 20-Dec-2018 12:21; Lo Anaya (RN); [employer]; [id number] (Exp. Date: 02-Nov-2020); IntraMuscular; Deltoid Left.; 0.5 milliLiter(s); VIS (VIS Published: 09-May-2013, VIS Presented: 20-Dec-2018);   Tdap; 26-Mar-2019 18:59; Shavon Barrios (RN); [employer]; [id number] (Exp. Date: 26-Feb-2021); IntraMuscular; Deltoid Left.; 0.5 milliLiter(s); VIS (VIS Published: 09-May-2013, VIS Presented: 26-Mar-2019);   Tdap; 01-Dec-2021 09:35; Zamzam Coulter (RN); [employer]; [id number] (Exp. Date: 09-Sep-2023); IntraMuscular; Deltoid Left.; 0.5 milliLiter(s); VIS (VIS Published: 09-May-2013, VIS Presented: 01-Dec-2021);   Tdap; 21-Jul-2022 01:28; Rose Hancock (RN); [employer]; [id number] (Exp. Date: 14-Oct-2024); IntraMuscular; Deltoid Right.; 0.5 milliLiter(s); VIS (VIS Published: 09-May-2013, VIS Presented: 21-Jul-2022);   Tdap; 12-Jun-2023 21:01; Diann Paul (RN); [employer]; [id number] (Exp. Date: 08-Mar-2025); IntraMuscular; Deltoid Left.; 0.5 milliLiter(s); VIS (VIS Published: 09-May-2013, VIS Presented: 12-Jun-2023);   Tdap; 24-Jul-2023 23:01; Diann Paul (RN); [employer]; [id number] (Exp. Date: 18-Aug-2025); IntraMuscular; Deltoid Right.; 0.5 milliLiter(s); VIS (VIS Published: 09-May-2013, VIS Presented: 24-Jul-2023);   Tdap; 09-Jun-2024 22:20; Dyllan Arevalo (RN); [employer]; [id number] (Exp. Date: 21-Nov-2024); IntraMuscular; Deltoid Right.; 0.5 milliLiter(s); VIS (VIS Published: 09-May-2013, VIS Presented: 09-Jun-2024);

## 2024-09-21 NOTE — DISCHARGE NOTE PROVIDER - CARE PROVIDER_API CALL
Marco Paul  Urology  [address], [address]  [place] [address]  Phone: [phone]  Fax: [phone]  Follow Up Time: 1 week

## 2024-09-21 NOTE — PROGRESS NOTE ADULT - SUBJECTIVE AND OBJECTIVE BOX
Patient is a 34y old  Male who presents with a chief complaint of Urinary Retention (19 Sep 2024 12:13)    INTERVAL HPI/OVERNIGHT EVENTS: Patient was seen and examined. Reports feeling ok. eating well. afebrile. + mckeon inserted yesterday secondary to retention. bladder scan 556 cc prior to mckeon insertion. Patient did not want straight cath. Group home unable to take care of patient with mckeon.     I&O's Summary    19 Sep 2024 07:01  -  20 Sep 2024 07:00  --------------------------------------------------------  IN: 0 mL / OUT: 950 mL / NET: -950 mL    20 Sep 2024 07:01  -  20 Sep 2024 09:17  --------------------------------------------------------  IN: 0 mL / OUT: 2000 mL / NET: -2000 mL        LABS:                        14.1   6.28  )-----------( 229      ( 20 Sep 2024 07:48 )             43.2     09-19    138  |  104  |  21  ----------------------------<  134[H]  4.2   |  27  |  1.00    Ca    9.9      19 Sep 2024 09:59    TPro  7.3  /  Alb  3.7  /  TBili  0.4  /  DBili  x   /  AST  38[H]  /  ALT  73  /  AlkPhos  91  09-19      Urinalysis Basic - ( 19 Sep 2024 09:59 )    Color: x / Appearance: x / SG: x / pH: x  Gluc: 134 mg/dL / Ketone: x  / Bili: x / Urobili: x   Blood: x / Protein: x / Nitrite: x   Leuk Esterase: x / RBC: x / WBC x   Sq Epi: x / Non Sq Epi: x / Bacteria: x      CAPILLARY BLOOD GLUCOSE      POCT Blood Glucose.: 126 mg/dL (20 Sep 2024 07:39)  POCT Blood Glucose.: 135 mg/dL (19 Sep 2024 21:50)  POCT Blood Glucose.: 153 mg/dL (19 Sep 2024 16:33)        Urinalysis Basic - ( 19 Sep 2024 09:59 )    Color: x / Appearance: x / SG: x / pH: x  Gluc: 134 mg/dL / Ketone: x  / Bili: x / Urobili: x   Blood: x / Protein: x / Nitrite: x   Leuk Esterase: x / RBC: x / WBC x   Sq Epi: x / Non Sq Epi: x / Bacteria: x        MEDICATIONS  (STANDING):  atorvastatin 10 milliGRAM(s) Oral at bedtime  busPIRone 15 milliGRAM(s) Oral <User Schedule>  cetirizine 10 milliGRAM(s) Oral daily  chlorhexidine 0.12% Liquid 15 milliLiter(s) Oral Mucosa two times a day  cloNIDine 0.3 milliGRAM(s) Oral three times a day  dextrose 5%. 1000 milliLiter(s) (50 mL/Hr) IV Continuous <Continuous>  dextrose 5%. 1000 milliLiter(s) (100 mL/Hr) IV Continuous <Continuous>  dextrose 50% Injectable 25 Gram(s) IV Push once  dextrose 50% Injectable 25 Gram(s) IV Push once  dextrose 50% Injectable 12.5 Gram(s) IV Push once  divalproex  milliGRAM(s) Oral daily  divalproex ER 1000 milliGRAM(s) Oral at bedtime  glucagon  Injectable 1 milliGRAM(s) IntraMuscular once  influenza   Vaccine 0.5 milliLiter(s) IntraMuscular once  insulin lispro (ADMELOG) corrective regimen sliding scale   SubCutaneous three times a day before meals  insulin lispro (ADMELOG) corrective regimen sliding scale   SubCutaneous at bedtime  levothyroxine 50 MICROGram(s) Oral daily  mirtazapine 22.5 milliGRAM(s) Oral at bedtime  pantoprazole    Tablet 40 milliGRAM(s) Oral before breakfast  risperiDONE   Tablet 4 milliGRAM(s) Oral <User Schedule>  senna 2 Tablet(s) Oral at bedtime  tamsulosin 0.4 milliGRAM(s) Oral at bedtime  testosterone 1% Gel 50 milliGRAM(s) Topical daily    MEDICATIONS  (PRN):  acetaminophen     Tablet .. 650 milliGRAM(s) Oral every 6 hours PRN Mild Pain (1 - 3)  dextrose Oral Gel 15 Gram(s) Oral once PRN Blood Glucose LESS THAN 70 milliGRAM(s)/deciliter  melatonin 3 milliGRAM(s) Oral at bedtime PRN Insomnia      REVIEW OF SYSTEMS:  CONSTITUTIONAL: No fever or chills  HEENT:  No headache  RESPIRATORY: No cough or shortness of breath  CARDIOVASCULAR: No chest pain  GASTROINTESTINAL: No abdominal pain, nausea, vomiting, or diarrhea  GENITOURINARY: + dysuria  NEUROLOGICAL: no focal weakness  MUSCULOSKELETAL: no myalgias  PSYCH: no recent changes in mood    RADIOLOGY & ADDITIONAL TESTS:    Imaging Personally Reviewed:  [x] YES  [ ] NO    Consultant(s) Notes Reviewed:  [x] YES  [ ] NO    PHYSICAL EXAM:  T(C): 36.7 (09-20-24 @ 06:15), Max: 36.7 (09-19-24 @ 15:55)  HR: 77 (09-20-24 @ 06:15) (63 - 77)  BP: 100/62 (09-20-24 @ 06:15) (100/62 - 130/92)  RR: 19 (09-20-24 @ 06:15) (18 - 19)  SpO2: 94% (09-20-24 @ 06:15) (94% - 98%)    GENERAL: well-developed, well-groomed  HEENT:  anicteric, moist mucous membranes  CHEST/LUNG:  CTA b/l, no rales, wheezes, or rhonchi  HEART:  RRR, S1, S2  ABDOMEN:  BS+, soft, nontender, nondistended, + mckeon   EXTREMITIES: no edema  NERVOUS SYSTEM: answers questions and follows commands appropriately  PSYCH: normal affect    Care Discussed with Consultants/Other Providers [x] YES  [ ] NO
PROGRESS NOTE:     Patient is a 34y old  Male who presents with a chief complaint of Urinary Retention (20 Sep 2024 09:17)          SUBJECTIVE & OBJECTIVE:   Pt seen and examined at bedside in AM. urinating since removal of mckeon   no overnight events.   no new complaints    REVIEW OF SYSTEMS: remaining ROS negative     PHYSICAL EXAM:  VITALS:  Vital Signs Last 24 Hrs  T(C): 37.1 (21 Sep 2024 05:45), Max: 37.1 (21 Sep 2024 05:45)  T(F): 98.8 (21 Sep 2024 05:45), Max: 98.8 (21 Sep 2024 05:45)  HR: 80 (21 Sep 2024 05:45) (76 - 80)  BP: 119/88 (21 Sep 2024 05:45) (95/65 - 119/88)  BP(mean): --  RR: 18 (21 Sep 2024 05:45) (18 - 18)  SpO2: 94% (21 Sep 2024 05:45) (93% - 94%)    Parameters below as of 21 Sep 2024 05:45  Patient On (Oxygen Delivery Method): room air          GENERAL: NAD,  no increased WOB  HEAD:  Atraumatic, Normocephalic  EYES: EOMI, conjunctiva and sclera clear  ENMT: Moist mucous membranes  NECK: Supple, No JVD  NERVOUS SYSTEM:  Alert   CHEST/LUNG: Clear to auscultation bilaterally; No rales, rhonchi, wheezing  HEART: Regular rate and rhythm  ABDOMEN: Soft, Nontender, Nondistended; Bowel sounds present  EXTREMITIES:   No clubbing, cyanosis, calf tenderness or edema b/l      MEDICATIONS  (STANDING):  atorvastatin 10 milliGRAM(s) Oral at bedtime  busPIRone 15 milliGRAM(s) Oral <User Schedule>  cetirizine 10 milliGRAM(s) Oral daily  chlorhexidine 0.12% Liquid 15 milliLiter(s) Oral Mucosa two times a day  cloNIDine 0.3 milliGRAM(s) Oral three times a day  dextrose 5%. 1000 milliLiter(s) (50 mL/Hr) IV Continuous <Continuous>  dextrose 5%. 1000 milliLiter(s) (100 mL/Hr) IV Continuous <Continuous>  dextrose 50% Injectable 25 Gram(s) IV Push once  dextrose 50% Injectable 25 Gram(s) IV Push once  dextrose 50% Injectable 12.5 Gram(s) IV Push once  divalproex  milliGRAM(s) Oral daily  divalproex ER 1000 milliGRAM(s) Oral at bedtime  glucagon  Injectable 1 milliGRAM(s) IntraMuscular once  influenza   Vaccine 0.5 milliLiter(s) IntraMuscular once  insulin lispro (ADMELOG) corrective regimen sliding scale   SubCutaneous three times a day before meals  insulin lispro (ADMELOG) corrective regimen sliding scale   SubCutaneous at bedtime  levothyroxine 50 MICROGram(s) Oral daily  mirtazapine 22.5 milliGRAM(s) Oral at bedtime  pantoprazole    Tablet 40 milliGRAM(s) Oral before breakfast  risperiDONE   Tablet 4 milliGRAM(s) Oral <User Schedule>  senna 2 Tablet(s) Oral at bedtime  tamsulosin 0.4 milliGRAM(s) Oral at bedtime  testosterone 1% Gel 50 milliGRAM(s) Topical daily    MEDICATIONS  (PRN):  acetaminophen     Tablet .. 650 milliGRAM(s) Oral every 6 hours PRN Mild Pain (1 - 3)  dextrose Oral Gel 15 Gram(s) Oral once PRN Blood Glucose LESS THAN 70 milliGRAM(s)/deciliter  melatonin 3 milliGRAM(s) Oral at bedtime PRN Insomnia      Allergies    Haldol (Other)  Celebrex (Short breath)  Haldol (Rash)  Bee stings (Unknown)  Zyprexa (Dystonic RXN)  Zyprexa (Unknown)    Intolerances              LABS:                           14.1   6.28  )-----------( 229      ( 20 Sep 2024 07:48 )             43.2     09-20    139  |  107  |  19  ----------------------------<  127[H]  4.4   |  27  |  0.96    Ca    9.5      20 Sep 2024 07:48        Urinalysis Basic - ( 20 Sep 2024 07:48 )    Color: x / Appearance: x / SG: x / pH: x  Gluc: 127 mg/dL / Ketone: x  / Bili: x / Urobili: x   Blood: x / Protein: x / Nitrite: x   Leuk Esterase: x / RBC: x / WBC x   Sq Epi: x / Non Sq Epi: x / Bacteria: x      CAPILLARY BLOOD GLUCOSE      POCT Blood Glucose.: 128 mg/dL (21 Sep 2024 07:50)  POCT Blood Glucose.: 124 mg/dL (20 Sep 2024 21:12)  POCT Blood Glucose.: 138 mg/dL (20 Sep 2024 16:39)  POCT Blood Glucose.: 154 mg/dL (20 Sep 2024 11:46)      Urinalysis with Rflx Culture (collected 19 Sep 2024 02:30)                RECENT CULTURES:          RADIOLOGY & ADDITIONAL TESTS:

## 2024-09-23 ENCOUNTER — EMERGENCY (EMERGENCY)
Facility: HOSPITAL | Age: 34
LOS: 0 days | Discharge: HOME | End: 2024-09-23
Attending: STUDENT IN AN ORGANIZED HEALTH CARE EDUCATION/TRAINING PROGRAM
Payer: MEDICAID

## 2024-09-23 VITALS
TEMPERATURE: 98 F | OXYGEN SATURATION: 95 % | RESPIRATION RATE: 18 BRPM | SYSTOLIC BLOOD PRESSURE: 100 MMHG | HEART RATE: 60 BPM | DIASTOLIC BLOOD PRESSURE: 50 MMHG

## 2024-09-23 VITALS
DIASTOLIC BLOOD PRESSURE: 54 MMHG | HEIGHT: 69 IN | OXYGEN SATURATION: 99 % | RESPIRATION RATE: 18 BRPM | HEART RATE: 90 BPM | TEMPERATURE: 98 F | SYSTOLIC BLOOD PRESSURE: 105 MMHG

## 2024-09-23 DIAGNOSIS — N40.0 BENIGN PROSTATIC HYPERPLASIA WITHOUT LOWER URINARY TRACT SYMPTOMS: ICD-10-CM

## 2024-09-23 DIAGNOSIS — K21.9 GASTRO-ESOPHAGEAL REFLUX DISEASE WITHOUT ESOPHAGITIS: ICD-10-CM

## 2024-09-23 DIAGNOSIS — E11.9 TYPE 2 DIABETES MELLITUS WITHOUT COMPLICATIONS: ICD-10-CM

## 2024-09-23 DIAGNOSIS — S50.811A ABRASION OF RIGHT FOREARM, INITIAL ENCOUNTER: ICD-10-CM

## 2024-09-23 DIAGNOSIS — H52.10 MYOPIA, UNSPECIFIED EYE: ICD-10-CM

## 2024-09-23 DIAGNOSIS — F79 UNSPECIFIED INTELLECTUAL DISABILITIES: ICD-10-CM

## 2024-09-23 DIAGNOSIS — Z91.030 BEE ALLERGY STATUS: ICD-10-CM

## 2024-09-23 DIAGNOSIS — I10 ESSENTIAL (PRIMARY) HYPERTENSION: ICD-10-CM

## 2024-09-23 DIAGNOSIS — F63.9 IMPULSE DISORDER, UNSPECIFIED: ICD-10-CM

## 2024-09-23 DIAGNOSIS — J45.909 UNSPECIFIED ASTHMA, UNCOMPLICATED: ICD-10-CM
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
## 2024-09-23 DIAGNOSIS — E78.5 HYPERLIPIDEMIA, UNSPECIFIED: ICD-10-CM

## 2024-09-23 DIAGNOSIS — Z90.79 ACQUIRED ABSENCE OF OTHER GENITAL ORGAN(S): Chronic | ICD-10-CM

## 2024-09-23 DIAGNOSIS — Z88.8 ALLERGY STATUS TO OTHER DRUGS, MEDICAMENTS AND BIOLOGICAL SUBSTANCES: ICD-10-CM
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
## 2024-09-23 DIAGNOSIS — F84.0 AUTISTIC DISORDER: ICD-10-CM

## 2024-09-23 DIAGNOSIS — E03.9 HYPOTHYROIDISM, UNSPECIFIED: ICD-10-CM

## 2024-09-23 LAB
ANION GAP SERPL CALC-SCNC: 5 MMOL/L — SIGNIFICANT CHANGE UP (ref 5–17)
APAP SERPL-MCNC: <2 UG/ML — LOW (ref 10–30)
BASOPHILS # BLD AUTO: 0.04 K/UL — SIGNIFICANT CHANGE UP (ref 0–0.2)
BASOPHILS NFR BLD AUTO: 0.5 % — SIGNIFICANT CHANGE UP (ref 0–2)
BUN SERPL-MCNC: 19 MG/DL — SIGNIFICANT CHANGE UP (ref 7–23)
CALCIUM SERPL-MCNC: 9.4 MG/DL — SIGNIFICANT CHANGE UP (ref 8.5–10.1)
CHLORIDE SERPL-SCNC: 105 MMOL/L — SIGNIFICANT CHANGE UP (ref 96–108)
CO2 SERPL-SCNC: 28 MMOL/L — SIGNIFICANT CHANGE UP (ref 22–31)
CREAT SERPL-MCNC: 1.01 MG/DL — SIGNIFICANT CHANGE UP (ref 0.5–1.3)
EGFR: 99 ML/MIN/1.73M2 — SIGNIFICANT CHANGE UP
EOSINOPHIL # BLD AUTO: 0.2 K/UL — SIGNIFICANT CHANGE UP (ref 0–0.5)
EOSINOPHIL NFR BLD AUTO: 2.6 % — SIGNIFICANT CHANGE UP (ref 0–6)
ETHANOL SERPL-MCNC: <10 MG/DL — SIGNIFICANT CHANGE UP (ref 0–10)
FLUAV AG NPH QL: SIGNIFICANT CHANGE UP
FLUBV AG NPH QL: SIGNIFICANT CHANGE UP
GLUCOSE SERPL-MCNC: 153 MG/DL — HIGH (ref 70–99)
HCT VFR BLD CALC: 42.3 % — SIGNIFICANT CHANGE UP (ref 39–50)
HGB BLD-MCNC: 13.3 G/DL — SIGNIFICANT CHANGE UP (ref 13–17)
IMM GRANULOCYTES NFR BLD AUTO: 0.6 % — SIGNIFICANT CHANGE UP (ref 0–0.9)
LYMPHOCYTES # BLD AUTO: 2.52 K/UL — SIGNIFICANT CHANGE UP (ref 1–3.3)
LYMPHOCYTES # BLD AUTO: 32.5 % — SIGNIFICANT CHANGE UP (ref 13–44)
MCHC RBC-ENTMCNC: 24.5 PG — LOW (ref 27–34)
MCHC RBC-ENTMCNC: 31.4 GM/DL — LOW (ref 32–36)
MCV RBC AUTO: 78 FL — LOW (ref 80–100)
MONOCYTES # BLD AUTO: 0.52 K/UL — SIGNIFICANT CHANGE UP (ref 0–0.9)
MONOCYTES NFR BLD AUTO: 6.7 % — SIGNIFICANT CHANGE UP (ref 2–14)
NEUTROPHILS # BLD AUTO: 4.42 K/UL — SIGNIFICANT CHANGE UP (ref 1.8–7.4)
NEUTROPHILS NFR BLD AUTO: 57.1 % — SIGNIFICANT CHANGE UP (ref 43–77)
PLATELET # BLD AUTO: 246 K/UL — SIGNIFICANT CHANGE UP (ref 150–400)
POTASSIUM SERPL-MCNC: 4.3 MMOL/L — SIGNIFICANT CHANGE UP (ref 3.5–5.3)
POTASSIUM SERPL-SCNC: 4.3 MMOL/L — SIGNIFICANT CHANGE UP (ref 3.5–5.3)
RBC # BLD: 5.42 M/UL — SIGNIFICANT CHANGE UP (ref 4.2–5.8)
RBC # FLD: 15.1 % — HIGH (ref 10.3–14.5)
RSV RNA NPH QL NAA+NON-PROBE: SIGNIFICANT CHANGE UP
SALICYLATES SERPL-MCNC: <1.7 MG/DL — LOW (ref 2.8–20)
SARS-COV-2 RNA SPEC QL NAA+PROBE: SIGNIFICANT CHANGE UP
SODIUM SERPL-SCNC: 138 MMOL/L — SIGNIFICANT CHANGE UP (ref 135–145)
WBC # BLD: 7.75 K/UL — SIGNIFICANT CHANGE UP (ref 3.8–10.5)
WBC # FLD AUTO: 7.75 K/UL — SIGNIFICANT CHANGE UP (ref 3.8–10.5)

## 2024-09-23 PROCEDURE — 80048 BASIC METABOLIC PNL TOTAL CA: CPT

## 2024-09-23 PROCEDURE — 80307 DRUG TEST PRSMV CHEM ANLYZR: CPT

## 2024-09-23 PROCEDURE — 85025 COMPLETE CBC W/AUTO DIFF WBC: CPT

## 2024-09-23 PROCEDURE — 99285 EMERGENCY DEPT VISIT HI MDM: CPT

## 2024-09-23 PROCEDURE — 0241U: CPT

## 2024-09-23 PROCEDURE — 99285 EMERGENCY DEPT VISIT HI MDM: CPT | Mod: 25

## 2024-09-23 PROCEDURE — 93010 ELECTROCARDIOGRAM REPORT: CPT

## 2024-09-23 PROCEDURE — 93005 ELECTROCARDIOGRAM TRACING: CPT

## 2024-09-23 PROCEDURE — 99284 EMERGENCY DEPT VISIT MOD MDM: CPT

## 2024-09-23 PROCEDURE — 36415 COLL VENOUS BLD VENIPUNCTURE: CPT
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
## 2024-09-23 NOTE — ED PROVIDER NOTE - CLINICAL SUMMARY MEDICAL DECISION MAKING FREE TEXT BOX
Vladimir Colunga DO (Attending): 34-year-old male past medical history of autism, diabetes, impulse control disorder, hyperlipidemia presents ED complaining of SI, hallucinations voices telling him to kill himself, right superficial abrasion to the right anterior forearms that was self-inflicted at the group home.  Patient arrives in PD custody.  Patient is calm and cooperative but does have history of significant agitation and violent behavior with staff.  Patient is up-to-date with tetanus.  Patient has been admitted previously psychiatrically. Patient calm and cooperative, left linear approximately 3 to 4 cm abrasion to the right anterior forearm, bleeding controlled, nothing to suture, no other signs of trauma and patient is up-to-date with tetanus.  Plan for psych clearance labs, psych consult.  Reassess

## 2024-09-23 NOTE — ED PROVIDER NOTE - NSFOLLOWUPINSTRUCTIONS_ED_ALL_ED_FT
1.  Please follow-up with your psychiatrist as scheduled.  2.  Please return to the ER or call 911 if you develop suicidal thoughts thoughts or hurting other people.  3.  Please continue taking all your medications as prescribed.

## 2024-09-23 NOTE — ED ADULT TRIAGE NOTE - CHIEF COMPLAINT QUOTE
brought  in by EMS accompanied by PD #[id number] from [facility] s/p self-inflicted wound to R forearm. patient with superficial cut with hard piece of plastic. no active bleeding at this time. site is covered with dressing. patient endorsing suicidal ideation, denies homicidal ideation. 1:1 initiated from triage.

## 2024-09-23 NOTE — ED BEHAVIORAL HEALTH NOTE - BEHAVIORAL HEALTH NOTE
===================   PRE-HOSPITAL COURSE   ==================   SOURCE: Mikie Delaney   DETAILS: From group home, command auditory hallucination reported stopped once in HH.    ============   ED COURSE   ============     SOURCE: Rn Elaina   ARRIVAL: BIB PD without incident.    BELONGINGS: Patient gave all belongings to security without incident.    BEHAVIOR: Before arriving, patient reportedly attempted to cut self in the group home. Cooperative, calm in ED. Aox3. Hygiene reported to good. Patient reported to be in a good mood and compliant with all labs. Patient on 1:1 for precaution.    TREATMENT: Basic wrap on arm.    VISITORS: Group home staff bedside.

## 2024-09-23 NOTE — ED BEHAVIORAL HEALTH ASSESSMENT NOTE - DETAILS
pt refuses self reports CAH to harm self (chronic) pt unable to engage in detailed inquiry Per chart has had a rash in response to Zyprexa and reports dystonic reaction to haldol

## 2024-09-23 NOTE — ED PROVIDER NOTE - PATIENT PORTAL LINK FT
Markus Lizama presents to Urgent Care Patient arriving with: alone with complaint of fell down nestor 2 weeks ago, landed on taibone   Felt better for a while and now its getting worse .      Patient and visitor wearing mask? Yes    Myself mask    Can leave detailed message on   mobile phone:    Mobile [phone]        You can access the FollowMyHealth Patient Portal offered by [facility] by registering at the following website: [email or web address]. By joining [facility]’s FollowMyHealth portal, you will also be able to view your health information using other applications (apps) compatible with our system.

## 2024-09-23 NOTE — ED ADULT NURSE REASSESSMENT NOTE - NS ED NURSE REASSESS COMMENT FT1
Patient prefers to be called Flaco. Patient assisted to the bathroom with [employer], patient undressed and placed in gown and hospital scrub pants. Belongings secured, patient with glasses on, security called to wand patient. Patient states he slit his wrist with unknown object, superficial laceration noted, no bleeding at this time, MD Colunga aware at bedside, bacitracin applied, tefla and gauze wrap applied. Constant observation maintained. Patient brought into room as he states "I don't want to be with all of those people listening to what's going on." Patient states he hears voices to kill himself, and sometimes to hurt other people. MD Colunga made aware.

## 2024-09-23 NOTE — ED ADULT NURSE NOTE - OBJECTIVE STATEMENT
pt presents to ER, from group home, for SI and HI. pt reports he cut his R wrist with a sharp piece of plastic. superficial lac noted to R forearm. states "for the last couple weeks the voices have been telling my to hurt myself, and the staff". endorsing auditory hallucinations. denies any other medical complaints.
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
## 2024-09-23 NOTE — ED PROVIDER NOTE - PHYSICAL EXAMINATION
GEN: Patient awake alert NAD.   HEENT: normocephalic, atraumatic, EOMI, no scleral icterus, moist MM  CARDIAC: RRR, S1, S2, no murmur.   PULM: CTA B/L no wheeze, rhonchi, rales.   ABD: soft NT, ND, no rebound no guarding,   MSK: Moving all extremities, no edema.   NEURO: A&Ox3, gait normal, no focal neurological deficits,   SKIN: Approximate 3 to 4 cm linear abrasion to the right forearm  PSYCH: calm, cooperative

## 2024-09-23 NOTE — ED ADULT TRIAGE NOTE - TEMPERATURE IN CELSIUS (DEGREES C)
Discharge Instructions    The hospital and staff members extend their best wishes to you as you are discharged. Because we are most concerned with your health, we suggest you carefully read the following instructions.    Activity Instructions  Ambulate.  No strenuous Exercise   May shower with soap and water.  Do not let shower hit incision directly.  Dry well.       Restrictions:   Sternal precautions.  No heavy lifting more than 5lbs for 6 weeks.  No driving for 4 weeks.     Diet:   Low Salt/Chol/Fat diet    Wound Care Instruction  Keep incision/wound dry-no tub baths.  Clean with soap and water daily. Do not apply ointments or powder to incision sites.   Remove old chest tube dressing tomorrow and leave open to air unless drainage noted then change daily.      When to call the doctors:  For any obvious bleeding, Fever over 101 degrees, Redness or swelling around the incision, Drainage(pus) from the wound, Unusual problems or difficulties, Persistent pain not relieved by the pain medication.    Call [phone] to have the operators page the doctor on call for Cardiothoracic surgery after hours with questions or concerns      Current pain management regimen    IF PAIN INTENSIFIES OR PAIN IS UNRELIEVED WITH MEDICATIONS AS ORDERED, CALL YOUR DOCTOR    36.4

## 2024-09-23 NOTE — ED BEHAVIORAL HEALTH ASSESSMENT NOTE - HPI (INCLUDE ILLNESS QUALITY, SEVERITY, DURATION, TIMING, CONTEXT, MODIFYING FACTORS, ASSOCIATED SIGNS AND SYMPTOMS)
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 with multiple past inpatient psychiatric admissions (as per [facility] staff, most recently 4/2024, hx of cutting himself, charted longstanding hx of endorsing SI (situational: mostly stemming from discontent of living environment) as well as hx of aggression toward [employer] staff ; consult called to evaluate SI. Has several recent ED visits for same--per past notes frequenting EDs reporting SI, requesting admission is consistent w pt's baseline.   The patient presents concrete, calm, cooperative, smiling with hospital staff, answering questions appropriately. He reported that he used plastic broken off from a remote control to cut himself, he reports he wanted to kill himself, and has felt this way for 2 months and doesn't know why. He reported he was admitted here for 3 days last week and wants to go back (of note was admitted Sept 19-21 for urinary retention as [employer] could manage mckeon). He reports he does not go to see a psychiatrist or go to a program, does not want to give history including what group home he lives in. Doesn't know his medical problems. He reports he wants to 'get admitted to psych' for 'psych treatment' to 'fix my meds, change the dose'.

## 2024-09-23 NOTE — ED BEHAVIORAL HEALTH ASSESSMENT NOTE - CURRENT MEDICATION
per last ED visit on Aug 21 2024:   Buspirone 15 mg nightly  VPA  mg at noon  VPA  mg 2 tabs nightly  Mirtazapine 15 mg 1 tab nightly  Risperidone 4 mg bid  Clonidine 0.2 mg TID

## 2024-09-23 NOTE — ED ADULT NURSE NOTE - CHIEF COMPLAINT QUOTE
brought  in by EMS accompanied by PD #[id number] from [facility] s/p self-inflicted wound to R forearm. patient with superficial cut with hard piece of plastic. no active bleeding at this time. site is covered with dressing. patient endorsing suicidal ideation, denies homicidal ideation. 1:1 initiated from triage.

## 2024-09-23 NOTE — ED ADULT NURSE NOTE - NSFALLUNIVINTERV_ED_ALL_ED
Bed/Stretcher in lowest position, wheels locked, appropriate side rails in place/Call bell, personal items and telephone in reach/Instruct patient to call for assistance before getting out of bed/chair/stretcher/Non-slip footwear applied when patient is off stretcher/[place] to call system/Physically safe environment - no spills, clutter or unnecessary equipment/Purposeful proactive rounding/Room/bathroom lighting operational, light cord in reach

## 2024-09-23 NOTE — ED PROVIDER NOTE - OBJECTIVE STATEMENT
34-year-old male past medical history of autism, diabetes, impulse control disorder, hyperlipidemia presents ED complaining of SI, hallucinations voices telling him to kill himself, right superficial abrasion to the right anterior forearms that was self-inflicted at the group home.  Patient arrives in PD custody.  Patient is calm and cooperative but does have history of significant agitation and violent behavior with staff.  Patient is up-to-date with tetanus.  Patient has been admitted previously psychiatrically.

## 2024-09-23 NOTE — ED PROVIDER NOTE - PROGRESS NOTE DETAILS
Vladimir oClunga DO (Attending): Patient's labs are nonactionable, EKG is normal.  Patient is medically cleared.  Spoke with telepsych who will see patient. Vladimir Colunga DO (Attending): Spoke with telepsych, patient is very low suicide risk as he is resides in a group home that is a locked unit with a 24-hour one-to-one.  Patient exhibits the symptoms together being in the group home most of the time.  Will discharge patient to follow-up with psychiatrist as an outpatient.

## 2024-09-23 NOTE — ED BEHAVIORAL HEALTH ASSESSMENT NOTE - DESCRIPTION
hpi lives at [facility] The patient is calm, cooperative, not needing PRNs.

## 2024-09-24 ENCOUNTER — EMERGENCY (EMERGENCY)
Facility: HOSPITAL | Age: 34
LOS: 0 days | Discharge: HOME | End: 2024-09-24
Attending: STUDENT IN AN ORGANIZED HEALTH CARE EDUCATION/TRAINING PROGRAM
Payer: MEDICAID

## 2024-09-24 VITALS
DIASTOLIC BLOOD PRESSURE: 96 MMHG | SYSTOLIC BLOOD PRESSURE: 120 MMHG | TEMPERATURE: 98 F | RESPIRATION RATE: 20 BRPM | OXYGEN SATURATION: 97 % | HEART RATE: 80 BPM

## 2024-09-24 VITALS
SYSTOLIC BLOOD PRESSURE: 121 MMHG | RESPIRATION RATE: 20 BRPM | HEART RATE: 75 BPM | TEMPERATURE: 98 F | WEIGHT: 214.95 LBS | DIASTOLIC BLOOD PRESSURE: 74 MMHG | OXYGEN SATURATION: 100 % | HEIGHT: 69 IN

## 2024-09-24 DIAGNOSIS — J45.909 UNSPECIFIED ASTHMA, UNCOMPLICATED: ICD-10-CM
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
## 2024-09-24 DIAGNOSIS — Z23 ENCOUNTER FOR IMMUNIZATION: ICD-10-CM

## 2024-09-24 DIAGNOSIS — K21.9 GASTRO-ESOPHAGEAL REFLUX DISEASE WITHOUT ESOPHAGITIS: ICD-10-CM

## 2024-09-24 DIAGNOSIS — H52.10 MYOPIA, UNSPECIFIED EYE: ICD-10-CM
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
## 2024-09-24 DIAGNOSIS — E78.5 HYPERLIPIDEMIA, UNSPECIFIED: ICD-10-CM

## 2024-09-24 DIAGNOSIS — Z88.8 ALLERGY STATUS TO OTHER DRUGS, MEDICAMENTS AND BIOLOGICAL SUBSTANCES: ICD-10-CM

## 2024-09-24 DIAGNOSIS — F84.0 AUTISTIC DISORDER: ICD-10-CM

## 2024-09-24 DIAGNOSIS — Z88.6 ALLERGY STATUS TO ANALGESIC AGENT: ICD-10-CM

## 2024-09-24 DIAGNOSIS — N40.1 BENIGN PROSTATIC HYPERPLASIA WITH LOWER URINARY TRACT SYMPTOMS: ICD-10-CM

## 2024-09-24 DIAGNOSIS — F90.9 ATTENTION-DEFICIT HYPERACTIVITY DISORDER, UNSPECIFIED TYPE: ICD-10-CM

## 2024-09-24 DIAGNOSIS — F41.1 GENERALIZED ANXIETY DISORDER: ICD-10-CM

## 2024-09-24 DIAGNOSIS — F63.9 IMPULSE DISORDER, UNSPECIFIED: ICD-10-CM

## 2024-09-24 DIAGNOSIS — S50.811A ABRASION OF RIGHT FOREARM, INITIAL ENCOUNTER: ICD-10-CM

## 2024-09-24 DIAGNOSIS — F79 UNSPECIFIED INTELLECTUAL DISABILITIES: ICD-10-CM

## 2024-09-24 DIAGNOSIS — T14.91XA SUICIDE ATTEMPT, INITIAL ENCOUNTER: ICD-10-CM

## 2024-09-24 DIAGNOSIS — I10 ESSENTIAL (PRIMARY) HYPERTENSION: ICD-10-CM

## 2024-09-24 DIAGNOSIS — R33.8 OTHER RETENTION OF URINE: ICD-10-CM

## 2024-09-24 DIAGNOSIS — E11.9 TYPE 2 DIABETES MELLITUS WITHOUT COMPLICATIONS: ICD-10-CM

## 2024-09-24 DIAGNOSIS — E03.9 HYPOTHYROIDISM, UNSPECIFIED: ICD-10-CM

## 2024-09-24 LAB
ALBUMIN SERPL ELPH-MCNC: 3.8 G/DL — SIGNIFICANT CHANGE UP (ref 3.3–5)
ALP SERPL-CCNC: 101 U/L — SIGNIFICANT CHANGE UP (ref 40–120)
ALT FLD-CCNC: 72 U/L — SIGNIFICANT CHANGE UP (ref 12–78)
AMPHET UR-MCNC: NEGATIVE — SIGNIFICANT CHANGE UP
ANION GAP SERPL CALC-SCNC: 7 MMOL/L — SIGNIFICANT CHANGE UP (ref 5–17)
APAP SERPL-MCNC: <2 UG/ML — LOW (ref 10–30)
AST SERPL-CCNC: 35 U/L — SIGNIFICANT CHANGE UP (ref 15–37)
BARBITURATES UR SCN-MCNC: NEGATIVE — SIGNIFICANT CHANGE UP
BASOPHILS # BLD AUTO: 0.03 K/UL — SIGNIFICANT CHANGE UP (ref 0–0.2)
BASOPHILS NFR BLD AUTO: 0.3 % — SIGNIFICANT CHANGE UP (ref 0–2)
BENZODIAZ UR-MCNC: NEGATIVE — SIGNIFICANT CHANGE UP
BILIRUB SERPL-MCNC: 0.3 MG/DL — SIGNIFICANT CHANGE UP (ref 0.2–1.2)
BUN SERPL-MCNC: 17 MG/DL — SIGNIFICANT CHANGE UP (ref 7–23)
CALCIUM SERPL-MCNC: 9.7 MG/DL — SIGNIFICANT CHANGE UP (ref 8.5–10.1)
CHLORIDE SERPL-SCNC: 101 MMOL/L — SIGNIFICANT CHANGE UP (ref 96–108)
CO2 SERPL-SCNC: 27 MMOL/L — SIGNIFICANT CHANGE UP (ref 22–31)
COCAINE METAB.OTHER UR-MCNC: NEGATIVE — SIGNIFICANT CHANGE UP
CREAT SERPL-MCNC: 1.05 MG/DL — SIGNIFICANT CHANGE UP (ref 0.5–1.3)
EGFR: 95 ML/MIN/1.73M2 — SIGNIFICANT CHANGE UP
EOSINOPHIL # BLD AUTO: 0.18 K/UL — SIGNIFICANT CHANGE UP (ref 0–0.5)
EOSINOPHIL NFR BLD AUTO: 2 % — SIGNIFICANT CHANGE UP (ref 0–6)
ETHANOL SERPL-MCNC: <10 MG/DL — SIGNIFICANT CHANGE UP (ref 0–10)
FENTANYL UR QL SCN: NEGATIVE — SIGNIFICANT CHANGE UP
GLUCOSE SERPL-MCNC: 157 MG/DL — HIGH (ref 70–99)
HCT VFR BLD CALC: 41.6 % — SIGNIFICANT CHANGE UP (ref 39–50)
HGB BLD-MCNC: 13.1 G/DL — SIGNIFICANT CHANGE UP (ref 13–17)
IMM GRANULOCYTES NFR BLD AUTO: 0.3 % — SIGNIFICANT CHANGE UP (ref 0–0.9)
LYMPHOCYTES # BLD AUTO: 3.08 K/UL — SIGNIFICANT CHANGE UP (ref 1–3.3)
LYMPHOCYTES # BLD AUTO: 34.5 % — SIGNIFICANT CHANGE UP (ref 13–44)
MCHC RBC-ENTMCNC: 24.7 PG — LOW (ref 27–34)
MCHC RBC-ENTMCNC: 31.5 GM/DL — LOW (ref 32–36)
MCV RBC AUTO: 78.5 FL — LOW (ref 80–100)
METHADONE UR-MCNC: NEGATIVE — SIGNIFICANT CHANGE UP
MONOCYTES # BLD AUTO: 0.5 K/UL — SIGNIFICANT CHANGE UP (ref 0–0.9)
MONOCYTES NFR BLD AUTO: 5.6 % — SIGNIFICANT CHANGE UP (ref 2–14)
NEUTROPHILS # BLD AUTO: 5.11 K/UL — SIGNIFICANT CHANGE UP (ref 1.8–7.4)
NEUTROPHILS NFR BLD AUTO: 57.3 % — SIGNIFICANT CHANGE UP (ref 43–77)
OPIATES UR-MCNC: NEGATIVE — SIGNIFICANT CHANGE UP
PCP SPEC-MCNC: SIGNIFICANT CHANGE UP
PCP UR-MCNC: NEGATIVE — SIGNIFICANT CHANGE UP
PLATELET # BLD AUTO: 241 K/UL — SIGNIFICANT CHANGE UP (ref 150–400)
POTASSIUM SERPL-MCNC: 3.7 MMOL/L — SIGNIFICANT CHANGE UP (ref 3.5–5.3)
POTASSIUM SERPL-SCNC: 3.7 MMOL/L — SIGNIFICANT CHANGE UP (ref 3.5–5.3)
PROT SERPL-MCNC: 7.4 GM/DL — SIGNIFICANT CHANGE UP (ref 6–8.3)
RBC # BLD: 5.3 M/UL — SIGNIFICANT CHANGE UP (ref 4.2–5.8)
RBC # FLD: 15.2 % — HIGH (ref 10.3–14.5)
SALICYLATES SERPL-MCNC: <1.7 MG/DL — LOW (ref 2.8–20)
SODIUM SERPL-SCNC: 135 MMOL/L — SIGNIFICANT CHANGE UP (ref 135–145)
THC UR QL: NEGATIVE — SIGNIFICANT CHANGE UP
WBC # BLD: 8.93 K/UL — SIGNIFICANT CHANGE UP (ref 3.8–10.5)
WBC # FLD AUTO: 8.93 K/UL — SIGNIFICANT CHANGE UP (ref 3.8–10.5)

## 2024-09-24 PROCEDURE — 80307 DRUG TEST PRSMV CHEM ANLYZR: CPT

## 2024-09-24 PROCEDURE — 99285 EMERGENCY DEPT VISIT HI MDM: CPT | Mod: 25

## 2024-09-24 PROCEDURE — 36415 COLL VENOUS BLD VENIPUNCTURE: CPT

## 2024-09-24 PROCEDURE — 93005 ELECTROCARDIOGRAM TRACING: CPT

## 2024-09-24 PROCEDURE — 99285 EMERGENCY DEPT VISIT HI MDM: CPT

## 2024-09-24 PROCEDURE — 85025 COMPLETE CBC W/AUTO DIFF WBC: CPT

## 2024-09-24 PROCEDURE — 90471 IMMUNIZATION ADMIN: CPT

## 2024-09-24 PROCEDURE — 80053 COMPREHEN METABOLIC PANEL: CPT

## 2024-09-24 PROCEDURE — 93010 ELECTROCARDIOGRAM REPORT: CPT

## 2024-09-24 PROCEDURE — 90715 TDAP VACCINE 7 YRS/> IM: CPT

## 2024-09-24 RX ORDER — TETANUS TOXOID, REDUCED DIPHTHERIA TOXOID AND ACELLULAR PERTUSSIS VACCINE, ADSORBED 5; 2.5; 8; 8; 2.5 [IU]/.5ML; [IU]/.5ML; UG/.5ML; UG/.5ML; UG/.5ML
0.5 SUSPENSION INTRAMUSCULAR ONCE
Refills: 0 | Status: COMPLETED | OUTPATIENT
Start: 2024-09-24 | End: 2024-09-24

## 2024-09-24 RX ADMIN — TETANUS TOXOID, REDUCED DIPHTHERIA TOXOID AND ACELLULAR PERTUSSIS VACCINE, ADSORBED 0.5 MILLILITER(S): 5; 2.5; 8; 8; 2.5 SUSPENSION INTRAMUSCULAR at 18:28

## 2024-09-24 NOTE — ED ADULT TRIAGE NOTE - CHIEF COMPLAINT QUOTE
patient brought in by EMS and [employer] badge 0022  from group home c/o self inflicted wound to right wrist.  broke license in half and sliced wrist with sharp part.  seen in ED yesterday for same symptoms.  + SI.  1:1 initiated in triage.

## 2024-09-24 NOTE — ED PROVIDER NOTE - OBJECTIVE STATEMENT
33yo m with past medical history of autism, diabetes, impulse control disorder, hyperlipidemia presents ED complaining of Suicidal attempt. pt attempted to cut his wrist with credit card. reports he hear voices, telling him to kill himself,  Patient arrives in PD custody.  Patient is calm and cooperative in ER and answering questions, state she want to get admitted to psych.  Patient is up-to-date with tetanus.  He has previous admission for  psychiatrically. denies any other complaints at present.

## 2024-09-24 NOTE — ED PROVIDER NOTE - NSFOLLOWUPINSTRUCTIONS_ED_ALL_ED_FT
Patient advised to  follow up with psychiatrist for follow up in 1-2 days.  patient understands and agrees with plan.  Return to ER if symptoms worsens or develop new symptoms.     Suicidal Feelings: How to Help Yourself  Suicide is when you end your own life. Suicidal ideation includes expressing thoughts about, or a preoccupation with, ending your own life. There are many things you can do to help yourself feel better when struggling with these feelings. Many services and people are available to support you and others who struggle with similar feelings.    If you ever feel like you may hurt yourself or others, or have thoughts about taking your own life, get help right away. To get help:  Go to your nearest emergency department.  Call your local emergency services (911 in the [place]  Call the [facility] and human services helpline (211 in the [place]  Call or text a suicide hotline to speak with a trained counselor. The following suicide hotlines are available in the [place]:  0-236-775-TALK (1-567.500.7594 or 105 in the [place]  7-389-FWJDYPC (1-663.301.2510).  Text 198703. This is the Crisis Text Line in the [place]  1-823.295.7519. This is a hotline for Slovenian speakers.  1-731.607.7764. This is a hotline for TTY users.  3-793-7-U-DELROY (1-442.920.8841). This is a hotline for lesbian, jones, bisexual, transgender, or questioning youth.  For a list of hotlines in [place], visit suicide.org/hotlines/international/pkdpus-zzxlipa-tddhcnph.html  Contact a crisis center or a local suicide prevention center. To find a crisis center or suicide prevention center:  Call your local hospital, clinic, community service organization, mental health center, social service provider, or health department. Ask for help with connecting to a crisis center.  For a list of crisis centers in the [place], visit: suicidepreventionlifeline.org  For a list of crisis centers in [place], visit: suicideprevention.ca  How to help yourself feel better  A teen talking with an adult.  Promise yourself that you will not do anything bad or extreme when you have suicidal feelings. Remember the times you have felt hopeful.  Many people have gotten through suicidal thoughts and feelings, and you can too.  If you have had these feelings before, remind yourself that you can get through them again.  Let family, friends, teachers, or counselors know how you are feeling. Do not separate yourself from those who care about you and want to help you.  Talk with someone every day, even if you do not feel like talking to anyone or being with other people.  Face-to-face conversation is best to help them understand your feelings.  Contact a mental health care provider and work with this person regularly.  Make a safety plan that you can follow during a crisis.  Include phone numbers of suicide prevention hotlines, mental health professionals, and trusted friends and family members you can call during an emergency.  Save these numbers on your phone.  If you are thinking of taking a lot of medicine, give your medicine to someone who can give it to you as prescribed.  If you are on antidepressants and are concerned you will overdose, tell your health care provider so that he or she can give you safer medicines.  Try to stick to your routines and follow a schedule every day. Make self-care a priority.  Make a list of realistic goals, and cross them off when you achieve them. Accomplishments can give you a sense of worth.  Wait until you are feeling better before doing things that you find difficult or unpleasant.  Do things that you have always enjoyed to take your mind off your feelings.  Try reading a book, or listening to or playing music.  Spending time outside, in nature, may help you feel better.  Follow these instructions at home:  A sign asking the reader not to drink beer, wine, or hard liquor.   Visit your primary health care provider every year for a physical and a mental health checkup.  Take over-the-counter and prescription medicines only as told by your health care provider.  Ask your health care provider about the possible side effects of any medicines you are taking.  Ask your health care provider about whether suicidal ideation is a possible side effect of any of your medicines.  Learn about suicidal ideation and what increases the risk for the development of suicidal thoughts.  Eat a well-balanced diet, and eat regular meals.  Get plenty of rest.  Exercise if you are able. Just 30 minutes of exercise each day can help you feel better.  Keep your living space well lit.  Do not use alcohol or drugs. Remove these substances from your home.  General recommendations  Remove weapons, poisons, knives, and other deadly items from your home.  Work with a mental health care provider as needed.  When you are feeling well, write yourself a letter with tips and support that you can read when you are not feeling well.  Remember that life's difficulties can be sorted out with help. Conditions can be treated, and you can learn behaviors and ways of thinking that will help you.  Work with your health care provider or counselor to learn ways of coping with your thoughts and feelings.  Where to find more information  National Suicide Prevention Lifeline: www.suicidepreventionlifeline.org  Hopeline: www.hopeline.Keoghs  American Foundation for Suicide Prevention: www.afsp.org  The [place] Project (for lesbian, jones, bisexual, transgender, or questioning youth): www.thetrevorproject.org  National Christopher of Mental Health: www.nimh.nih.gov/health/topics/suicide-prevention  Suicide Prevention Resources: afsp.org/suicide-prevention-resources  Contact a health care provider if:  You feel as though you are a burden to others.  You feel agitated, angry, vengeful, or have extreme mood swings.  You have withdrawn from family and friends.  You are frequently using drugs or alcohol.  Get help right away if:  You are talking about suicide or wishing to die.  You start making plans for how to commit suicide.  You feel that you have no reason to live.  You start making plans for putting your affairs in order, saying goodbye, or giving your possessions away.  You feel guilt, shame, or unbearable pain, and it seems like there is no way out.  You are engaging in risky behaviors that could lead to death.  If you have any of these thoughts or symptoms, get help right away:  Go to your nearest emergency department or crisis center.  Call emergency services (911 in the [place]  Call or text a suicide crisis helpline.  Summary  Suicide is when you take your own life. Suicidal feelings are thoughts about ending your own life.  Promise yourself that you will not do anything bad or extreme when you have suicidal feelings.  Let family, friends, teachers, or counselors know how you are feeling.  Get help right away if you start making plans for how to commit suicide.  This information is not intended to replace advice given to you by your health care provider. Make sure you discuss any questions you have with your health care provider.

## 2024-09-24 NOTE — ED BEHAVIORAL HEALTH ASSESSMENT NOTE - NSPRESENTSXS_PSY_ALL_CORE
GASTROENTEROLOGY CONSULTATION      ------------------------------------------------------------------------------------------------------------------       Reason for Consultation:   Coffee Ground Emesis         ASSESSMENT AND RECOMMENDATIONS:   Assessment:  68 year old male with a history of SCC of the vallecula and esophageal squamous cell carcinoma s/p chemoradiation and Feliberto-Elbert esophagectomy (4/19/24), GOO s/p J tube placement and LAMs across pyloric stricture, CAD s/p PCI w/KAYDEN (03/27/23), HLD, and HTN who presented to the hospital with concerns for coffee ground emesis over the past two days.     GI consulted for evaluation of coffee ground emesis.      #Coffee Ground Emesis   #Esophageal SCC s/p Feliberto-Elbert esophagectomy  #GOO s/p LAMs placement across pyloric stenosis   #J tube feeding     The patient had a stent revision on 8/8/24 however in the days following the procedure started having pain, nausea, and symptoms of regurgitation. Patient was instructed to hold PO intake on 8/10 and just continue with J tube feeding with plan for stent revision on 8/15/24. Patient over the past 24-36 hours reports that he has had about 12 episodes of nausea with initial coffee ground emesis but by the end was noting small amounts of bright red blood.     The patient is clinically and hemodynamically stable at the time of evaluation. Hb is stable. Suspect UGIB source. Differential includes erosion/trauma 2/2 metallic stent across pylorus, possible mucosal injury along the esophago-gastric anastomosis, Estella-Hathaway tear, PUD, among others.  No indication for emergent endoscopic intervention at this time.     Plan:   - No indication for emergent endoscopic intervention at this time  - Plan for EGD with stent revision on 8/15/24 with Dr. Monahan. Discussed with the patient that during this upper endoscopy the rest of the UGI tract will also be evaluated not just the pyloric stent.   - Okay for sips of clear liquids today.  "Okay to start J Tube feeding.   - Hold TF and NPO at [facility] tomorrow for procedure on 8/15  - Okay to switch to PPI IV BID  - Hb goal > 7   - Maintain two large bore IVs    - Please do not hesitate to reach out to the on call GI service if there are any signs of hemodynamically significant bleeding noted.     Above was discussed in detail with patient, spouse, and primary team who agree with above assessment and plan.    Discussed with attending GI physician Dr Kurtz    Gastroenterology follow up recommendations: Pending clinical course     Sandip Quinonez MD  Gastroenterology Fellow, PGY-4           History of Present Illness:     Lopez Hogue is a 68 year old male with a PMH significant for SCC of the vallecula and esophageal squamous cell carcinoma s/p chemoradiation and Feliberto-Elbert esophagectomy (4/19/24), GOO s/p J tube placement and LAMs across pyloric stricture, CAD s/p PCI w/KAYDEN (03/27/23), HLD, and HTN who presented to the ED for coffee ground emesis.     The patient states that he got his stent revised on 8/8/24 and was doing well post procedure but after going home started having worsening epigastric discomfort, regurgitation, and nausea. The called the GI clinic and was instructed to stop oral intake and increase J tube feeding with plan for stent revision on 8/15. The patient states that on Sunday evening he started to having increased nausea and has had about 12 episodes of dark 'coffee ground' like emesis over the past one day. He states that the last few episodes had streaks of bright red blood thus prompting him to present to the ED. The patient reports some mild chest discomfort and reports that it feels like \"things are feeling stuck a bit higher\". He states that it does not feel exactly like it did before his initial stent revision. The patient does not endorse any fevers, chills, melena, hematochezia, shortness of breath, or lightheadedness. The patient does not take any " anticoagulation. The patient has no further acute complaints or concerns.     Previous Gastroenterology Procedures:  EGD 8/8/24  Findings:       Fluoroscopy utilized throughout procedure.  film showed stent in        place across the pylorus. Contrast still present in the colon from        recent esophagram.        An esophago-gastric anastomosis was found in the upper third of the        esophagus. This was widely patent.        Excessive fluid/food debris was found in the entire examined stomach and        suctioned out.        A metal Axios stent was found at the pylorus however the distal end of        the stent was obstructed as it was up against the duodenal bulb wall.        Stent removal was accomplished with a rat-toothed forceps.        A benign-appearing, intrinsic mild stenosis was found at the pylorus.        Improved form before. This was traversed. This was stented with 20 mm x        10 mm Axios stent under fluoroscopic guidance. Estimated blood loss was        minimal. New stent now does not appear obstructed.        The examined duodenum was normal.     EGD 5/15/24  Findings:       NG tube removed.        A healthy esophago-gastric anastomosis was found in the upper third of        the esophagus.        Normal stomach conduit. Pylorus was stenotic. This was stented with 20        mm x 10 mm lumen apposing stent (Axios) under fluoroscopic and        endoscopic guidance.        The examined duodenum was normal.             Past Medical History:   Reviewed and edited as appropriate  Past Medical History:   Diagnosis Date    Anxiety     Aortic stenosis     CAD (coronary artery disease)     ETOH dependence     Quit drinking 10 years    Heart attack (H)     History of blood transfusion     HLD (hyperlipidemia)     Hypertension     Malignant neoplasm of middle third of esophagus (H)     Nonrheumatic aortic (valve) stenosis     Sweat gland carcinoma             Past Surgical History:   Reviewed and  edited as appropriate   Past Surgical History:   Procedure Laterality Date    BIOPSY  June 29015    Left gluteal and groin lymphnodes    BRONCHOSCOPY FLEXIBLE AND RIGID N/A 04/19/2024    Procedure: Bronchoscopy flexible and rigid;  Surgeon: Devin Hill MD;  Location: [facility]    BRONCHOSCOPY FLEXIBLE AND RIGID N/A 04/28/2024    Procedure: Bronchoscopy flexible and rigid;  Surgeon: Jessie Kim MD;  Location: [facility]    CARDIAC SURGERY  March 2023    2 stents placed    COLONOSCOPY 2012    CV CORONARY ANGIOGRAM N/A 03/27/2023    Procedure: Coronary Angiogram;  Surgeon: Miki Etienne MD;  Location: [facility]    CV CORONARY ANGIOGRAM N/A 05/09/2023    Procedure: Coronary Angiogram;  Surgeon: Miki Etienne MD;  Location: [facility] CV    CV LEFT HEART CATH N/A 03/27/2023    Procedure: Left Heart Catheterization;  Surgeon: Miki Etienne MD;  Location: [facility] CV    CV LEFT HEART CATH N/A 05/09/2023    Procedure: Left Heart Catheterization;  Surgeon: Miki Etienne MD;  Location: [facility] CV    CV PCI N/A 03/27/2023    Procedure: Percutaneous Coronary Intervention;  Surgeon: Miki Etienne MD;  Location: [facility] CV    ENDOSCOPIC ULTRASOUND UPPER GASTROINTESTINAL TRACT (GI) N/A 11/28/2023    Procedure: Endoscopic ultrasound upper gastrointestinal tract (GI);  Surgeon: Shahriar Giraldo MD;  Location: [facility]    ESOPHAGOGASTRODUODENOSCOPY, WITH BOTULINUM TOXIN INJECTION N/A 04/29/2024    Procedure: Esophagogastroduodenoscopy, With Botulinum Toxin Injection;  Surgeon: Jessie Kim MD;  Location: [facility]    ESOPHAGOSCOPY, GASTROSCOPY, DUODENOSCOPY (EGD), COMBINED N/A 11/08/2023    Procedure: ESOPHAGOGASTRODUODENOSCOPY, WITH BIOPSY;  Surgeon: Shahriar Giraldo MD;  Location: [facility]    ESOPHAGOSCOPY, GASTROSCOPY, DUODENOSCOPY (EGD), COMBINED N/A 04/19/2024    Procedure: Esophagoscopy, gastroscopy, duodenoscopy (EGD), combined;  Surgeon:  Devin Hill MD;  Location: [facility]    ESOPHAGOSCOPY, GASTROSCOPY, DUODENOSCOPY (EGD), COMBINED N/A 2024    Procedure: Esophagoscopy, gastroscopy, duodenoscopy (EGD), combined;  Surgeon: Jessie Kim MD;  Location: [facility]    ESOPHAGOSCOPY, GASTROSCOPY, DUODENOSCOPY (EGD), COMBINED N/A 2024    Procedure: Esophagoscopy, gastroscopy, duodenoscopy (EGD), combined-under fluoro & dilation, nasogastric tube placement, bronchoscopy;  Surgeon: Kevin Calderon MD;  Location: [facility]    ESOPHAGOSCOPY, GASTROSCOPY, DUODENOSCOPY (EGD), COMBINED N/A 2024    Procedure: ESOPHAGOGASTRODUODENOSCOPY with fluoroscopy and stent placement;  Surgeon: Livan Monahan MD;  Location: [facility]    IR JEJUNOSTOMY TUBE CHANGE  2024    LAPAROSCOPIC ASSISTED INSERTION TUBE JEJUNOSTOMY N/A 2024    Procedure: Laparoscopic Jejunostomy Tube Insertion and Esophagogastroduodenoscopy;  Surgeon: Kevin Calderon MD;  Location: [facility]    LARYNGOSCOPY WITH BIOPSY(IES) N/A 10/12/2023    Procedure: LARYNGOSCOPY, WITH BIOPSY;  Surgeon: Edi Felix MD;  Location: [facility]    OTHER SURGICAL HISTORY  2015    WIDE EXCISION OF LEFT GLUTEAL MASSTNM: [id number], stage: II hidradenocarcinoma Grade II, margins 30 mm, sentinel lymph node biopsy negative     SOFT TISSUE SURGERY  2023    left gluteal and lymphnodes    THORACOSCOPIC, LAPAROSCOPIC ESOPHAGECTOMY, COMBINED N/A 2024    Procedure: Laparoscopic and right thoracoscopic esophagogastrectomy, right AND left chest tube placement;  Surgeon: Devin Hill MD;  Location: [facility]    VASCULAR SURGERY  2023    Cath 2 stents              Social History:     Social History     Tobacco Use    Smoking status: Former     Current packs/day: 0.00     Average packs/day: 2.0 packs/day for 41.0 years (82.0 ttl pk-yrs)     Types: Cigarettes     Start date: 1972     Quit date: 2013     Years since quittin.1     Passive exposure: Past     Smokeless tobacco: Never    Tobacco comments:     Quit 10 years ago   Vaping Use    Vaping status: Never Used   Substance Use Topics    Alcohol use: Not Currently     Comment: Stopped 11 years ago    Drug use: Not Currently     Types: Cocaine, Marijuana              Family History:   Reviewed and edited as appropriate  Family History   Problem Relation Age of Onset    Dementia Mother     Mental Illness Mother         Dementia    Substance Abuse Son         Alcohol    Substance Abuse Sister         Alcohol    Substance Abuse Brother         Alcohol    Anesthesia Reaction No family hx of     Thrombocytopenia No family hx of     Cancer No family hx of     Thrombosis No family hx of              Allergies:   Reviewed and edited as appropriate     Allergies   Allergen Reactions    Coconut Flavor Anaphylaxis     Raw coconut            Medications:   (Not in a hospital admission)            Review of Systems:     A complete 10 pt review of systems was performed and is negative except as noted in the HPI           Physical Exam:   Temp: 98.4  F (36.9  C) Temp src: Oral BP: 118/66 Pulse: 104   Resp: 16 SpO2: 96 % O2 Device: None (Room air)    Wt:   Wt Readings from Last 2 Encounters:   08/12/24 63.5 kg (140 lb)   08/08/24 63.8 kg (140 lb 11.2 oz)        General: male in NAD.  Answers appropriately.  Wife at bedside.   HEENT: Head is AT/NC. No conjunctival injection.  Oropharynx is clear, moist and w/o exudate or lesions.  Neck: Full ROM, no cervical lymphadenopathy  Lungs: No respiratory distress   Heart: RRR  Abdomen:  Soft, non-tender, non-distended.  No hepatosplenomegaly. No rebound or peritoneal signs  Extremities: No pedal edema.     MSK: Moving all extremities spontaneously   Skin: No jaundice, rash, purpura or petichiae  Neurologic: Grossly non-focal.            Data:   Labs and imaging below were independently reviewed and interpreted    LAB WORK:    Results for orders placed or performed during the hospital  encounter of 08/13/24   XR Chest 2 Views     Status: None    Narrative    EXAM: XR CHEST 2 VIEWS  LOCATION: [facility]  DATE: 8/13/2024    INDICATION: Hematemesis.  COMPARISON: 5/20/2024.    FINDINGS: The heart size is normal. The thoracic aorta is calcified and tortuous. The lungs are clear. No pneumothorax or pleural effusion. Multiple surgical clips in the mediastinum. Old right rib fracture. Stent at the GE junction.      Impression    IMPRESSION: No acute abnormality.   INR     Status: Normal   Result Value Ref Range    INR 0.92 0.85 - 1.15   Partial thromboplastin time     Status: Normal   Result Value Ref Range    aPTT 30 22 - 38 Seconds   Comprehensive metabolic panel     Status: Abnormal   Result Value Ref Range    Sodium 139 135 - 145 mmol/L    Potassium 3.6 3.4 - 5.3 mmol/L    Carbon Dioxide (CO2) 28 22 - 29 mmol/L    Anion Gap 12 7 - 15 mmol/L    Urea Nitrogen 20.8 8.0 - 23.0 mg/dL    Creatinine 0.60 (L) 0.67 - 1.17 mg/dL    GFR Estimate >90 >60 mL/min/1.73m2    Calcium 9.6 8.8 - 10.4 mg/dL    Chloride 99 98 - 107 mmol/L    Glucose 110 (H) 70 - 99 mg/dL    Alkaline Phosphatase 153 (H) 40 - 150 U/L    AST 73 (H) 0 - 45 U/L    ALT 83 (H) 0 - 70 U/L    Protein Total 7.6 6.4 - 8.3 g/dL    Albumin 4.3 3.5 - 5.2 g/dL    Bilirubin Total 1.0 <=1.2 mg/dL   Lipase     Status: Normal   Result Value Ref Range    Lipase 43 13 - 60 U/L   CBC with platelets and differential     Status: Abnormal   Result Value Ref Range    WBC Count 9.2 4.0 - 11.0 10e3/uL    RBC Count 4.66 4.40 - 5.90 10e6/uL    Hemoglobin 14.6 13.3 - 17.7 g/dL    Hematocrit 43.6 40.0 - 53.0 %    MCV 94 78 - 100 fL    MCH 31.3 26.5 - 33.0 pg    MCHC 33.5 31.5 - 36.5 g/dL    RDW 14.3 10.0 - 15.0 %    Platelet Count 173 150 - 450 10e3/uL    % Neutrophils 86 %    % Lymphocytes 7 %    % Monocytes 6 %    % Eosinophils 0 %    % Basophils 0 %    % Immature Granulocytes 1 %    NRBCs per 100 WBC 0 <1 /100    Absolute  Neutrophils 7.9 1.6 - 8.3 10e3/uL    Absolute Lymphocytes 0.6 (L) 0.8 - 5.3 10e3/uL    Absolute Monocytes 0.6 0.0 - 1.3 10e3/uL    Absolute Eosinophils 0.0 0.0 - 0.7 10e3/uL    Absolute Basophils 0.0 0.0 - 0.2 10e3/uL    Absolute Immature Granulocytes 0.1 <=0.4 10e3/uL    Absolute NRBCs 0.0 10e3/uL   Comprehensive metabolic panel     Status: Abnormal   Result Value Ref Range    Sodium 141 135 - 145 mmol/L    Potassium 3.7 3.4 - 5.3 mmol/L    Carbon Dioxide (CO2) 29 22 - 29 mmol/L    Anion Gap 10 7 - 15 mmol/L    Urea Nitrogen 18.9 8.0 - 23.0 mg/dL    Creatinine 0.55 (L) 0.67 - 1.17 mg/dL    GFR Estimate >90 >60 mL/min/1.73m2    Calcium 9.5 8.8 - 10.4 mg/dL    Chloride 102 98 - 107 mmol/L    Glucose 118 (H) 70 - 99 mg/dL    Alkaline Phosphatase 123 40 - 150 U/L    AST 52 (H) 0 - 45 U/L    ALT 68 0 - 70 U/L    Protein Total 7.0 6.4 - 8.3 g/dL    Albumin 4.0 3.5 - 5.2 g/dL    Bilirubin Total 1.3 (H) <=1.2 mg/dL   CBC with platelets     Status: Abnormal   Result Value Ref Range    WBC Count 7.5 4.0 - 11.0 10e3/uL    RBC Count 4.02 (L) 4.40 - 5.90 10e6/uL    Hemoglobin 12.8 (L) 13.3 - 17.7 g/dL    Hematocrit 37.6 (L) 40.0 - 53.0 %    MCV 94 78 - 100 fL    MCH 31.8 26.5 - 33.0 pg    MCHC 34.0 31.5 - 36.5 g/dL    RDW 14.2 10.0 - 15.0 %    Platelet Count 149 (L) 150 - 450 10e3/uL   Adult Type and Screen     Status: None   Result Value Ref Range    ABO/RH(D) O POS     Antibody Screen Negative Negative    SPECIMEN EXPIRATION DATE [id number]    CBC with platelets differential     Status: Abnormal    Narrative    The following orders were created for panel order CBC with platelets differential.  Procedure                               Abnormality         Status                     ---------                               -----------         ------                     CBC with platelets and d...[[id number]]  Abnormal            Final result                 Please view results for these tests on the individual orders.   ABO/Rh  type and screen     Status: None    Narrative    The following orders were created for panel order ABO/Rh type and screen.  Procedure                               Abnormality         Status                     ---------                               -----------         ------                     Adult Type and Screen[[id number]]                            Final result                 Please view results for these tests on the individual orders.         IMAGING:    CXR 8/13/24  FINDINGS: The heart size is normal. The thoracic aorta is calcified and tortuous. The lungs are clear. No pneumothorax or pleural effusion. Multiple surgical clips in the mediastinum. Old right rib fracture. Stent at the GE junction.       =======================================================================     Impulsivity

## 2024-09-24 NOTE — ED PROVIDER NOTE - DISCHARGE DATE
24-Sep-2024 Elidel Counseling: Patient may experience a mild burning sensation during topical application. Elidel is not approved in children less than 2 years of age. There have been case reports of hematologic and skin malignancies in patients using topical calcineurin inhibitors although causality is questionable.

## 2024-09-24 NOTE — ED BEHAVIORAL HEALTH ASSESSMENT NOTE - CURRENT MEDICATION
As per staff:   Buspirone 15 mg nightly  VPA  mg at noon  VPA  mg 2 tabs nightly  Mirtazapine 22.5  mg 1 nightly  Risperidone 4 mg bid  Clonidine 0.3 mg BID  Senna 2 tabs at night  Metformin 1000 mg BID

## 2024-09-24 NOTE — ED ADULT NURSE NOTE - HISTORY OF COVID-19 VACCINATION
Vaccine status unknown Detail Level: Detailed Size Of Lesion: 1.1 cm Size Of Lesion: 9mm Size Of Lesion: 1cm Size Of Lesion: 3cm Size Of Lesion: 4.3cm

## 2024-09-24 NOTE — ED BEHAVIORAL HEALTH ASSESSMENT NOTE - NSBHMSEMOOD_PSY_A_CORE
Impression: Dry eye syndrome of bilateral lacrimal glands: H04.123. Bilateral. Plan: Pt ed re: condition. Use AT's BID-QID OU, Omega-3 fatty acids, humidifier. Consider Restasis, Alrex if condition not improved at next exam.  RTC 3-4 weeks if conditions persist; otherwise, RTC 1 year x CEE.
Impression: Regular astigmatism, bilateral: H52.223. Bilateral. Plan: Refractive error accounts for symptoms. Release SRX. RTC 1 year x CEE.
Depressed

## 2024-09-24 NOTE — ED BEHAVIORAL HEALTH ASSESSMENT NOTE - DETAILS
pt refuses self Per chart has had a rash in response to Zyprexa and reports dystonic reaction to haldol Patient did not engage in exploration, At first he was stating that he was suicidal, reports CAH to harm self (chronic)

## 2024-09-24 NOTE — ED ADULT NURSE NOTE - NSFALLUNIVINTERV_ED_ALL_ED
Bed/Stretcher in lowest position, wheels locked, appropriate side rails in place/Call bell, personal items and telephone in reach/Instruct patient to call for assistance before getting out of bed/chair/stretcher/Non-slip footwear applied when patient is off stretcher/[place] to call system/Physically safe environment - no spills, clutter or unnecessary equipment/Purposeful proactive rounding/Room/bathroom lighting operational, light cord in reach

## 2024-09-24 NOTE — ED BEHAVIORAL HEALTH ASSESSMENT NOTE - NS ED BHA PLAN TR BH CONTACTED FT
not available at this time, reviewed prior documentation and discussed with staff from home that was present with patient.

## 2024-09-24 NOTE — ED PROVIDER NOTE - CLINICAL SUMMARY MEDICAL DECISION MAKING FREE TEXT BOX
35yo m with past medical history of autism, diabetes, impulse control disorder, hyperlipidemia presents ED complaining of Suicidal attempt. will r/o organic pathology. Plan to do labs, and psych eval.

## 2024-09-24 NOTE — ED PROVIDER NOTE - PATIENT PORTAL LINK FT
You can access the FollowMyHealth Patient Portal offered by [facility] by registering at the following website: [email or web address]. By joining [employer] [facility]’s FollowMyHealth portal, you will also be able to view your health information using other applications (apps) compatible with our system.

## 2024-09-24 NOTE — ED ADULT NURSE NOTE - CHIEF COMPLAINT QUOTE
patient brought in by EMS and [employer] badge [id number]  from group home c/o self inflicted wound to right wrist.  broke license in half and sliced wrist with sharp part.  seen in ED yesterday for same symptoms.  + SI.  1:1 initiated in triage.

## 2024-09-24 NOTE — ED PROVIDER NOTE - PHYSICAL EXAMINATION
General: Patient in no acute distress, AAOX3.   HENMT: NC/AT, no nasal congestion, MMM  Neck: supple  CVS: regular rate and rhythm, no murmur  Resp: Good air entry bilaterally, No wheeze/rhonchi.  Abd: Soft non tender, non distended, +bowel sounds, No guarding, rebound tenderness   Ext: + right forearm with linear abrasion, no active bleeding, FROM in all ext, 2+ pulses throughout, cap refill<2 sec.  BACK: no midline tenderness, no stepoffs  NEURO: no focal deficit, gross motor and sensory intact throughout, gait stable.

## 2024-09-24 NOTE — ED ADULT NURSE NOTE - OBJECTIVE STATEMENT
Pt BIBEMS from group home c/o SI. Pt has self inflicted wound on his right wrist. Pt attempted to cut his wrist with credit card. Also reports he hears voices that tell him to harm himself.  Patient arrives in PD custody.  Patient is calm and cooperative in ER, States he wants to be admitted to psych. Pt was here for same symptoms. Denies any other complaints. Pt is awake and alert, a&ox4, no s/s of acute distress noted at this time. 1:1 in place.

## 2024-09-24 NOTE — ED BEHAVIORAL HEALTH ASSESSMENT NOTE - DESCRIPTION
hpi The patient is calm, cooperative, not needing PRNs. He did not appear to be responding to interna stimuli. He was not in any physical distress.     ICU Vital Signs Last 24 Hrs  T(C): 36.9 (24 Sep 2024 16:17), Max: 36.9 (24 Sep 2024 16:17)  T(F): 98.4 (24 Sep 2024 16:17), Max: 98.4 (24 Sep 2024 16:17)  HR: 75 (24 Sep 2024 16:17) (75 - 75)  BP: 121/74 (24 Sep 2024 16:17) (121/74 - 121/74)  BP(mean): --  ABP: --  ABP(mean): --  RR: 20 (24 Sep 2024 16:17) (20 - 20)  SpO2: 100% (24 Sep 2024 16:17) (100% - 100%)    O2 Parameters below as of 24 Sep 2024 16:17  Patient On (Oxygen Delivery Method): room air lives at [facility]

## 2024-09-24 NOTE — ED BEHAVIORAL HEALTH ASSESSMENT NOTE - HPI (INCLUDE ILLNESS QUALITY, SEVERITY, DURATION, TIMING, CONTEXT, MODIFYING FACTORS, ASSOCIATED SIGNS AND SYMPTOMS)
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 with multiple past inpatient psychiatric admissions (as per [facility] staff, most recently 4/2024, hx of cutting himself, charted longstanding hx of endorsing SI (situational: mostly stemming from discontent of living environment) as well as hx of aggression toward [facility] staff ; consult called to evaluate possible  SI. Has several recent ED visits for same--per past notes frequenting EDs reporting SI, requesting admission is consistent w pt's baseline.  Patient presented yesterday for same reason.      On interview, patient presents concrete, calm, cooperative, smiling with hospital staff, answering questions appropriately.   He did not appear to be responding to interna stimuli.  He reportedly cut his arm superficially with credit card.    Patient was focused on being admitted to the hospital.  He gestured at his arm and stated "look what happened because you didn't admit me....I need to go tho the hospital".   He stated that he is hearing voices and wanted to kill himself.  He reports that he has felt this way for two months.  He reports that he has been taking his medications.   He was focused on being admitted to change his medications.       Patient stated that he got along with everyone at home.  He reported that he was looking forward to working.  He reports stress related to his fiance who broke up with him 3 years ago after she kissed someone else.  He got into an animated discussion about his favorite movie Lord of the Rings.  He stated he liked eating sushi.  He reported that he then wanted to go home. He stated that staff had to stay with him at his room and stated that he cut himself because staff did not stay with him in his room. Patient did not exhibit any pressured speech, elated mood, decreased need for sleep or any common symptoms of everett.  He reported that he was depressed but this was not congruent with his affect.  He stated that he was looking forward to working.        Spoke with patient and  (Catarina)  together. She noted that patient engaged in suicidal ideation.   She was in agreement with  patient returning home  (as was patient ) and will call with an earlier appointment with psychiatrist.  . As per Catarina patient last saw psychiatrist earlier in the month and Clonidine was changed from 0..2 TID to 0.3 BID.  She noted that she would call to get an earlier appointment with psychiatrist.

## 2024-09-24 NOTE — ED PROVIDER NOTE - PROGRESS NOTE DETAILS
Patient cleared by psychiatry for outpatient follow-up. Discussed the  results with the patient.  patient agrees to follow-up with psychiatry outpatient.  States he feels fine.  Denies any suicidal ideations at present.   Will DC back to the facility with aid.  Red flags discussed.  Return precautions given. labs with no acute pathology. patient pending psychiatry evaluation.  right forearm wound repaired with surgical glue. no complications.

## 2024-09-24 NOTE — ED BEHAVIORAL HEALTH ASSESSMENT NOTE - SUMMARY
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 with multiple past inpatient psychiatric admissions (as per long-term staff, most recently 4/2024, hx of cutting himself, charted longstanding hx of endorsing SI (situational: mostly stemming from discontent of living environment) as well as hx of aggression toward long-term staff ; consult called to evaluate SI. Patient presented yesterday for same reason.    Has several recent ED visits for same--per past notes frequenting EDs reporting SI, requesting admission is consistent w pt's baseline.     The assesment remains the same as prior presentations.  The patient presents with chronic suicidal ideation and plan in the context of attention seeking behavior. He has a history of constantly calling EMS for either somatic complaints or psychiatric complaints of AH or SI. He currently lives in a group home with 1:1 staff, protocols in place to ensure patient’s safety. His group home staff members are well aware of his desire for constant medical attention and are equipped to manage his chronic suicidality. Given that his clinical presentation w/ full pleasant affect is not congruent with  reported suicidality. He also does not appear to be responding to internal stimuli.  Patient responded well to support and remained future oriented and upon further exploration wanted to return home.  He is currently in appropriate level of care and presentation is not consistent w/ exacerbation of psychosis.  Patient does have good support at home, and would not would not benefit from inpatient psychiatric admission as this would likely not change outcome and only reinforce behavior in seeking unnecessary treatment. The patient is best served in ongoing treatment in his group home.  Patient responded well to support.  There are no psychiatric barriers to discharge.     Plan:   Clear for outpatient follow up  Continue current medications  Team to seek earlier appointment with psychiatrist   continue 1:1 at home

## 2024-09-24 NOTE — ED ADULT TRIAGE NOTE - NS_BH TRG QUESTION1_ED_ALL_ED
Pre-application: Motor, sensory, and vascular responses intact in the injured extremity./Post-application: Motor, sensory, and vascular responses intact in the injured extremity./The patient/caregiver verbalized understanding of how to care for the injured extremity with splint
No

## 2024-09-25 NOTE — ED PROVIDER NOTE - PROGRESS NOTE DETAILS
1/12/24
Patient pain controlled. well appearing. no signs of distress. given uro f.u, return precaution and instructed to f.u pmd. us negative for infection and torsion.

## 2024-09-27 ENCOUNTER — NON-APPOINTMENT (OUTPATIENT)
Age: 34
End: 2024-09-27

## 2024-09-28 ENCOUNTER — EMERGENCY (EMERGENCY)
Facility: HOSPITAL | Age: 34
LOS: 1 days | Discharge: HOME | End: 2024-09-28
Attending: EMERGENCY MEDICINE | Admitting: EMERGENCY MEDICINE
Payer: MEDICAID

## 2024-09-28 VITALS
TEMPERATURE: 98 F | OXYGEN SATURATION: 96 % | SYSTOLIC BLOOD PRESSURE: 115 MMHG | HEIGHT: 69 IN | HEART RATE: 90 BPM | DIASTOLIC BLOOD PRESSURE: 75 MMHG | WEIGHT: 300.93 LBS | RESPIRATION RATE: 16 BRPM

## 2024-09-28 VITALS
DIASTOLIC BLOOD PRESSURE: 75 MMHG | RESPIRATION RATE: 16 BRPM | OXYGEN SATURATION: 97 % | HEART RATE: 87 BPM | SYSTOLIC BLOOD PRESSURE: 128 MMHG | TEMPERATURE: 98 F

## 2024-09-28 VITALS
SYSTOLIC BLOOD PRESSURE: 139 MMHG | HEART RATE: 93 BPM | DIASTOLIC BLOOD PRESSURE: 79 MMHG | RESPIRATION RATE: 16 BRPM | OXYGEN SATURATION: 96 % | HEIGHT: 69 IN | TEMPERATURE: 98 F

## 2024-09-28 DIAGNOSIS — Z90.79 ACQUIRED ABSENCE OF OTHER GENITAL ORGAN(S): Chronic | ICD-10-CM

## 2024-09-28 LAB
ALBUMIN SERPL ELPH-MCNC: 3.1 G/DL — LOW (ref 3.3–5)
ALP SERPL-CCNC: 97 U/L — SIGNIFICANT CHANGE UP (ref 40–120)
ALT FLD-CCNC: 48 U/L — SIGNIFICANT CHANGE UP (ref 12–78)
ANION GAP SERPL CALC-SCNC: 7 MMOL/L — SIGNIFICANT CHANGE UP (ref 5–17)
APAP SERPL-MCNC: <2 UG/ML — LOW (ref 10–30)
AST SERPL-CCNC: 21 U/L — SIGNIFICANT CHANGE UP (ref 15–37)
BASOPHILS # BLD AUTO: 0.04 K/UL — SIGNIFICANT CHANGE UP (ref 0–0.2)
BASOPHILS NFR BLD AUTO: 0.6 % — SIGNIFICANT CHANGE UP (ref 0–2)
BILIRUB SERPL-MCNC: 0.2 MG/DL — SIGNIFICANT CHANGE UP (ref 0.2–1.2)
BUN SERPL-MCNC: 14 MG/DL — SIGNIFICANT CHANGE UP (ref 7–23)
CALCIUM SERPL-MCNC: 8.5 MG/DL — SIGNIFICANT CHANGE UP (ref 8.5–10.1)
CHLORIDE SERPL-SCNC: 106 MMOL/L — SIGNIFICANT CHANGE UP (ref 96–108)
CO2 SERPL-SCNC: 27 MMOL/L — SIGNIFICANT CHANGE UP (ref 22–31)
CREAT SERPL-MCNC: 0.86 MG/DL — SIGNIFICANT CHANGE UP (ref 0.5–1.3)
EGFR: 116 ML/MIN/1.73M2 — SIGNIFICANT CHANGE UP
EOSINOPHIL # BLD AUTO: 0.29 K/UL — SIGNIFICANT CHANGE UP (ref 0–0.5)
EOSINOPHIL NFR BLD AUTO: 4.1 % — SIGNIFICANT CHANGE UP (ref 0–6)
ETHANOL SERPL-MCNC: <10 MG/DL — SIGNIFICANT CHANGE UP (ref 0–10)
GLUCOSE SERPL-MCNC: 172 MG/DL — HIGH (ref 70–99)
HCT VFR BLD CALC: 38 % — LOW (ref 39–50)
HGB BLD-MCNC: 11.6 G/DL — LOW (ref 13–17)
IMM GRANULOCYTES NFR BLD AUTO: 0.7 % — SIGNIFICANT CHANGE UP (ref 0–0.9)
LYMPHOCYTES # BLD AUTO: 2.66 K/UL — SIGNIFICANT CHANGE UP (ref 1–3.3)
LYMPHOCYTES # BLD AUTO: 37.7 % — SIGNIFICANT CHANGE UP (ref 13–44)
MCHC RBC-ENTMCNC: 23.8 PG — LOW (ref 27–34)
MCHC RBC-ENTMCNC: 30.5 GM/DL — LOW (ref 32–36)
MCV RBC AUTO: 77.9 FL — LOW (ref 80–100)
MONOCYTES # BLD AUTO: 0.38 K/UL — SIGNIFICANT CHANGE UP (ref 0–0.9)
MONOCYTES NFR BLD AUTO: 5.4 % — SIGNIFICANT CHANGE UP (ref 2–14)
NEUTROPHILS # BLD AUTO: 3.63 K/UL — SIGNIFICANT CHANGE UP (ref 1.8–7.4)
NEUTROPHILS NFR BLD AUTO: 51.5 % — SIGNIFICANT CHANGE UP (ref 43–77)
NRBC # BLD: 0 /100 WBCS — SIGNIFICANT CHANGE UP (ref 0–0)
PLATELET # BLD AUTO: 215 K/UL — SIGNIFICANT CHANGE UP (ref 150–400)
POTASSIUM SERPL-MCNC: 4.2 MMOL/L — SIGNIFICANT CHANGE UP (ref 3.5–5.3)
POTASSIUM SERPL-SCNC: 4.2 MMOL/L — SIGNIFICANT CHANGE UP (ref 3.5–5.3)
PROT SERPL-MCNC: 6.5 G/DL — SIGNIFICANT CHANGE UP (ref 6–8.3)
RBC # BLD: 4.88 M/UL — SIGNIFICANT CHANGE UP (ref 4.2–5.8)
RBC # FLD: 15.2 % — HIGH (ref 10.3–14.5)
SALICYLATES SERPL-MCNC: <1.7 MG/DL — LOW (ref 2.8–20)
SODIUM SERPL-SCNC: 140 MMOL/L — SIGNIFICANT CHANGE UP (ref 135–145)
WBC # BLD: 7.05 K/UL — SIGNIFICANT CHANGE UP (ref 3.8–10.5)
WBC # FLD AUTO: 7.05 K/UL — SIGNIFICANT CHANGE UP (ref 3.8–10.5)

## 2024-09-28 PROCEDURE — 80307 DRUG TEST PRSMV CHEM ANLYZR: CPT

## 2024-09-28 PROCEDURE — 93010 ELECTROCARDIOGRAM REPORT: CPT

## 2024-09-28 PROCEDURE — 99285 EMERGENCY DEPT VISIT HI MDM: CPT

## 2024-09-28 PROCEDURE — 36415 COLL VENOUS BLD VENIPUNCTURE: CPT

## 2024-09-28 PROCEDURE — 99285 EMERGENCY DEPT VISIT HI MDM: CPT | Mod: 25

## 2024-09-28 PROCEDURE — 85025 COMPLETE CBC W/AUTO DIFF WBC: CPT

## 2024-09-28 PROCEDURE — 99284 EMERGENCY DEPT VISIT MOD MDM: CPT

## 2024-09-28 PROCEDURE — 80053 COMPREHEN METABOLIC PANEL: CPT

## 2024-09-28 PROCEDURE — 93005 ELECTROCARDIOGRAM TRACING: CPT

## 2024-09-28 NOTE — ED BEHAVIORAL HEALTH ASSESSMENT NOTE - NSSUICPROTFACT_PSY_ALL_CORE
RR IOL; , male infant Supportive social network of family or friends/Positive therapeutic relationships

## 2024-09-28 NOTE — ED PROVIDER NOTE - NSFOLLOWUPINSTRUCTIONS_ED_ALL_ED_FT
Mood Disorders    WHAT YOU NEED TO KNOW:    What is a mood disorder? A mood disorder is a medical condition that makes it hard for you to control your mood or emotions. Your mood can affect your personality, behavior, and outlook on life. A mood disorder is also called an affective disorder.    What increases my risk for a mood disorder?    A major change in your life    Trauma or stress    Chemical changes in your body    Certain medicines such as hormones or steroids    A family history of mood disorder    Alcohol or substance use disorder  What are the signs and symptoms of a mood disorder?    Changes in your eating habits, energy level, weight, or sleeping patterns    Low self-esteem    Feeling hopeless, anxious, or sad    Loss of interest in daily or enjoyable activities    Irritability or frequent mood swings    Low sex drive    Trouble concentrating or making decisions  How is a mood disorder diagnosed? Your healthcare provider will ask about your medical and social history. He or she will ask if you have ever wanted to hurt yourself or others. Tell him or her if you have people in your life who support you. Tell him or her about your behaviors, feelings, and relationships with others. Your answers will help determine which treatment is best for you.    How is a mood disorder treated? Treatment will depend on the cause of your mood disorder and how severe your symptoms are. You may need any of the following:    Medicines can help control your moods.    Talk therapy can help identify stressors in your life and how to deal with them. Talk therapy can be with a therapist, counselor, or psychiatrist. Sessions may be one-on-one or with family.  How can I manage my symptoms?    Try to get 6 to 8 hours of sleep each night. Contact your healthcare provider if you have trouble sleeping.    Manage stress. Learn new ways to relax, such as deep breathing or meditation.    Talk to someone about how you feel. Join a support group. Talk to your healthcare provider, family, or friends about your feelings.    Exercise regularly. Ask about the best exercise plan for you. Most healthcare providers recommend 30 minutes each day, 5 days a week. Exercise helps to lower stress and manage moods.  [employer] Walking for Exercise  Call your local emergency number (751 in the [place]) if:    You feel like you want to hurt yourself or others.    When should I call my doctor?    You feel more depressed or anxious than usual.    Your medicine causes you to feel drowsy, keeps you awake, or affects how much you eat.    You have questions or concerns about your condition or care.  CARE AGREEMENT:    You have the right to help plan your care. Learn about your health condition and how it may be treated. Discuss treatment options with your healthcare providers to decide what care you want to receive. You always have the right to refuse treatment.

## 2024-09-28 NOTE — ED ADULT NURSE NOTE - OBJECTIVE STATEMENT
pt presents to the ED c/o suicidal ideation. pt from group home cut his R wrist with a sharp plastic tool. no active bleeding noted. as per staff member, pt has a lot of attention seeking behavior. upon arrival to ED, pt states he wants to harm himself. denies HI. pt appears calm and cooperative. safety precautions maintained. pt placed in a gown. aox4, maex4. 1:1 constant observation in place. pending MD [place].

## 2024-09-28 NOTE — ED PROVIDER NOTE - PROGRESS NOTE DETAILS
Case d/w Telepsych for consult Pt psychiatrically cleared for discharge  Pt's Aide is at the bedside to take pt back to his group home

## 2024-09-28 NOTE — ED ADULT NURSE NOTE - NSFALLUNIVINTERV_ED_ALL_ED
Bed/Stretcher in lowest position, wheels locked, appropriate side rails in place/Call bell, personal items and telephone in reach/Instruct patient to call for assistance before getting out of bed/chair/stretcher/Non-slip footwear applied when patient is off stretcher/[place] to call system/Physically safe environment - no spills, clutter or unnecessary equipment/Purposeful proactive rounding/Room/bathroom lighting operational, light cord in reach

## 2024-09-28 NOTE — ED BEHAVIORAL HEALTH ASSESSMENT NOTE - SUMMARY
Pt is a 35 y/o male, single, domiciled at [facility], with PMH of asthma, DM, BPH, urinary retention, h/o testicular cancer s/p orchiectomy,  GERD, hypothyroidism, HLD, HTN, and myopia; PPHx autism spectrum disorder, moderate Intellectual disability; also bipolar disorder, impulse control disorder, HAVEN, mood disorder NOS, PTSD, ADHD and factitious disorder. with multiple past inpatient psychiatric admissions (as per chart review, most recently 4/2024, hx of cutting himself, charted longstanding hx of endorsing SI (situational: mostly stemming from discontent of living environment) as well as hx of aggression toward group home staff brought in from [facility] after picking on his scab. Older healing wound visualized in eval, per ER attending no acute wound care required.    On evaluation pt is irritable, refuses to speak initially because he wants to sleep, linear in thinking, able to answer questions and with supportive interventions becomes more amenable. He says that he cut his arm with a plastic card in his room because the staff irritates him, said they call him names and insult him. When asked about medications he said, "I refuse and it is my right!" He says he needs to be admitted and re remains focused on this during the interview, demanding admission but refusing to provide answers to questions in detail. Admits to feeling depressed but does not want to talk about it. Denies any changes in his emotions or suicidality since the last ER visit. He says "it is the same thing for the past 2 months" with regards to his suicidality, reporting chronic active SI without method/plan. He also affirms AH when asked about it but does not appear to be experiencing hallucinations and refuses to give more details. Denies any changes in the reported AH. No VH/HI elicited. Presentation appears consistent with ER visits in the past, lastly seen 9/24, all with the same presentation including pt's demand of admission.    Called [facility] and obtained collateral from on call staff, he reported that staff was assisting the pt and pt got angry and went to his room, was seen picking on his scab, no new cut was made by pt. Staff reported this is pt's typical presentation and denied any acutely elevated safety concerns regarding pt's d/c back  to the group home.    Presentation consistent with past ER visits and appears at baseline. Pt has good supportive system at group home and is not likely to benefit from psychiatric hospitalization at this time, in fact decompensation risk of hospitalization outweighs potential benefits at this time. Pt is psychiatrically cleared for d/c back to [facility] group [place].

## 2024-09-28 NOTE — ED ADULT TRIAGE NOTE - CHIEF COMPLAINT QUOTE
Came in ambulatory in triage accompanied by an aide from group home patient reports to kill himself by slashing himself  on his right wrist with a sharp plastic tonight. Came in ambulatory in triage accompanied by an aide from group home patient reports he wanted to kill himself by slashing himself  on his right wrist with a sharp plastic tonight. Came in ambulatory in triage accompanied by an aide from group home patient reports he wanted to kill himself by slashing himself  on his right wrist with a sharp plastic tonight. Noted laceration on the right wrist. Came in ambulatory in triage accompanied by an aide from group home patient reports he wanted to kill himself by slashing himself  with a sharp plastic tonight. Noted laceration on the right wrist.

## 2024-09-28 NOTE — ED PROVIDER NOTE - PATIENT PORTAL LINK FT
You can access the FollowMyHealth Patient Portal offered by [facility] by registering at the following website: [email or web address]. By joining [employer]’s FollowMyHealth portal, you will also be able to view your health information using other applications (apps) compatible with our system.

## 2024-09-28 NOTE — ED ADULT NURSE NOTE - CHIEF COMPLAINT QUOTE
Came in ambulatory in triage accompanied by an aide from group home patient reports he wanted to kill himself by slashing himself  with a sharp plastic tonight. Noted laceration on the right wrist.

## 2024-09-28 NOTE — ED ADULT NURSE REASSESSMENT NOTE - NS ED NURSE REASSESS COMMENT FT1
iv lock 22g placed to L wrist, patent and flushing. no s/s redness, swelling or infiltration. labs collected and sent. pending telepsych.

## 2024-09-28 NOTE — ED BEHAVIORAL HEALTH ASSESSMENT NOTE - NICOTINE
How Severe Is Your Rash?: mild Is This A New Presentation, Or A Follow-Up?: Rash Additional History: Pt believes she got sun poison which made a bumpy area on her arm. None known

## 2024-09-28 NOTE — ED BEHAVIORAL HEALTH ASSESSMENT NOTE - HPI (INCLUDE ILLNESS QUALITY, SEVERITY, DURATION, TIMING, CONTEXT, MODIFYING FACTORS, ASSOCIATED SIGNS AND SYMPTOMS)
Pt is a 35 y/o male, single, domiciled at [facility], with PMH of asthma, DM, BPH, urinary retention, h/o testicular cancer s/p orchiectomy,  GERD, hypothyroidism, HLD, HTN, and myopia; PPHx autism spectrum disorder, moderate Intellectual disability; also bipolar disorder, impulse control disorder, HAVEN, mood disorder NOS, PTSD, ADHD and factitious disorder. with multiple past inpatient psychiatric admissions (as per chart review, most recently 4/2024, hx of cutting himself, charted longstanding hx of endorsing SI (situational: mostly stemming from discontent of living environment) as well as hx of aggression toward group home staff brought in from [facility] after picking on his scab. Older healing wound visualized in eval, per ER attending no acute wound care required.    On evaluation pt is irritable, refuses to speak initially because he wants to sleep, linear in thinking, able to answer questions and with supportive interventions becomes more amenable. He says that he cut his arm with a plastic card in his room because the staff irritates him, said they call him names and insult him. When asked about medications he said, "I refuse and it is my right!" He says he needs to be admitted and re remains focused on this during the interview, demanding admission but refusing to provide answers to questions in detail. Admits to feeling depressed but does not want to talk about it. Denies any changes in his emotions or suicidality since the last ER visit. He says "it is the same thing for the past 2 months" with regards to his suicidality, reporting chronic active SI without method/plan. He also affirms AH when asked about it but does not appear to be experiencing hallucinations and refuses to give more details. Denies any changes in the reported AH. No VH/HI elicited. Presentation appears consistent with ER visits in the past, lastly seen 9/24, all with the same presentation including pt's demand of admission.    Called [facility] and obtained collateral from on call staff, he reported that staff was assisting the pt and pt got angry and went to his room, was seen picking on his scab, no new cut was made by pt. Staff reported this is pt's typical presentation and denied any acutely elevated safety concerns regarding pt's d/c back  to the group home.    Presentation consistent with past ER visits and appears at baseline. Pt has good supportive system at group home and is not likely to benefit from psychiatric hospitalization at this time, in fact decompensation risk of hospitalization outweighs potential benefits at this time. Pt is psychiatrically cleared for d/c back to [facility] group [place].

## 2024-09-28 NOTE — ED BEHAVIORAL HEALTH ASSESSMENT NOTE - SAFETY PLAN ADDT'L DETAILS
Safety plan discussed with.../Education provided regarding environmental safety / lethal means restriction/Provision of National Suicide Prevention Lifeline 2-127-981-ZQGI (8803)

## 2024-09-28 NOTE — ED BEHAVIORAL HEALTH ASSESSMENT NOTE - NSBHATTESTBILLING_PSY_A_CORE
[id number] diagnostic evaluation with medical services Patient with sore throat. Will send antibiotics.

## 2024-09-28 NOTE — ED BEHAVIORAL HEALTH ASSESSMENT NOTE - CURRENT MEDICATION
As per chart review  Buspirone 15 mg nightly  VPA  mg at noon  VPA  mg 2 tabs nightly  Mirtazapine 22.5  mg 1 nightly  Risperidone 4 mg bid  Clonidine 0.3 mg BID  Senna 2 tabs at night  Metformin 1000 mg BID

## 2024-09-28 NOTE — ED PROVIDER NOTE - OBJECTIVE STATEMENT
33 yo M , group home resident, intellectual disability, presents with self injurious behavior. Pt states he was stressed so he went to the kitchen and found a knife and cut himself at 5pm today. Pt denies he did it because he was stressed. Also admits to suicidal ideation.

## 2024-09-28 NOTE — ED BEHAVIORAL HEALTH ASSESSMENT NOTE - DESCRIPTION
hpi The patient is calm, superficially cooperative, not needing PRNs. He did not appear to be responding to interna stimuli. He was not in any physical distress.   Vital Signs Last 24 Hrs  T(C): 36.5 (09-28-24 @ 03:06), Max: 36.5 (09-28-24 @ 03:06)  T(F): 97.7 (09-28-24 @ 03:06), Max: 97.7 (09-28-24 @ 03:06)  HR: 93 (09-28-24 @ 03:06) (93 - 93)  BP: 139/79 (09-28-24 @ 03:06) (139/79 - 139/79)  BP(mean): --  RR: 16 (09-28-24 @ 03:06) (16 - 16)  SpO2: 96% (09-28-24 @ 03:06) (96% - 96%) lives at [facility]

## 2024-09-28 NOTE — ED PROVIDER NOTE - CLINICAL SUMMARY MEDICAL DECISION MAKING FREE TEXT BOX
35 yo M with self injurious behavior of cutting himself on the R forearm and suicidal ideation  Will obtain psych labs  Maintain 1:1 observation  Wound care, no sutures required  Telepsych consult

## 2024-09-29 PROCEDURE — 99283 EMERGENCY DEPT VISIT LOW MDM: CPT

## 2024-09-29 RX ORDER — CEPHALEXIN 500 MG
500 CAPSULE ORAL ONCE
Refills: 0 | Status: COMPLETED | OUTPATIENT
Start: 2024-09-29 | End: 2024-09-29

## 2024-09-29 RX ORDER — CEPHALEXIN 500 MG
1 CAPSULE ORAL
Qty: 21 | Refills: 0
Start: 2024-09-29 | End: 2024-10-05

## 2024-09-29 RX ORDER — BACITRACIN 500 UNIT/G
1 OINTMENT (GRAM) TOPICAL
Qty: 15 | Refills: 0
Start: 2024-09-29 | End: 2024-10-05

## 2024-09-29 RX ORDER — BACITRACIN 500 UNIT/G
1 OINTMENT (GRAM) TOPICAL ONCE
Refills: 0 | Status: COMPLETED | OUTPATIENT
Start: 2024-09-29 | End: 2024-09-29

## 2024-09-29 RX ADMIN — Medication 500 MILLIGRAM(S): at 02:30

## 2024-09-29 RX ADMIN — Medication 1 APPLICATION(S): at 02:30

## 2024-09-29 NOTE — ED PROVIDER NOTE - PATIENT PORTAL LINK FT
You can access the FollowMyHealth Patient Portal offered by [facility] by registering at the following website: [email or web address]. By joining [employer]’s FollowMyHealth portal, you will also be able to view your health information using other applications (apps) compatible with our system.

## 2024-09-29 NOTE — ED PROVIDER NOTE - SKIN WOUND TYPE
Linear shallow wound to the right upper extremity volar aspect of the forearm.  Mild surrounding erythema.  No purulent discharge.

## 2024-09-29 NOTE — ED PROVIDER NOTE - OBJECTIVE STATEMENT
34-year-old male, history of intellectual disability resides at a group home is brought in by staff for evaluation of a right upper extremity wound.  Patient was seen by me the previous night in the ED for self-inflicted injury and was subsequently psychiatrically cleared for discharge.  Staff is bringing him in today for evaluation of possible infection.  Patient was seen in urgent care was told to go to the ED and did not prescribe antibiotics.  No new wounds or injuries today.

## 2024-09-29 NOTE — ED PROVIDER NOTE - NSFOLLOWUPINSTRUCTIONS_ED_ALL_ED_FT
Please have the patient apply the bacitracin cream twice daily for 7 days.  Keep the wound covered with gauze until wound develops a scab.  Encourage patient not to peel or pick at the scab wound.  Keflex should be taken 3 times a day for 7 days.  Should any redness increase or patient has drainage from the wound or fever please return to the emergency room.

## 2024-09-29 NOTE — ED ADULT NURSE REASSESSMENT NOTE - NS ED NURSE REASSESS COMMENT FT1
Bacitracin and dressing applied to left wrist laceration, pt given 1st dose of keflex in ED.  Pt to dc home and f/u out pt with PMD if needed, instructed on wound care and to continue abx as prescribed.  Pt and DSP expressed understanding of dc instructions.

## 2024-09-29 NOTE — ED ADULT NURSE NOTE - NSFALLHARMRISKINTERV_ED_ALL_ED

## 2024-09-29 NOTE — ED PROVIDER NOTE - CLINICAL SUMMARY MEDICAL DECISION MAKING FREE TEXT BOX
34-year-old male with a right forearm wound presenting for evaluation for possible cellulitis.  Wound evaluated, minimal redness surrounding the wound, no active drainage.  Will place on topical bacitracin and oral Keflex and advised to have wound evaluated in 3 to 4 days by his primary care doctor.

## 2024-09-29 NOTE — ED ADULT NURSE NOTE - OBJECTIVE STATEMENT
Pt presented to ED from  c/o possible cellulitis to left wrist laceration.  Pt was seen in ED yesterday.  Pt is from group home, DSP at bedside.  Afebrile, denies chills.  Area surrounding wound noted to be pinkish, does not feel hot to touch.  No chest pain or SOB.  No n/v/d.  Maintain comfort.

## 2024-10-01 NOTE — ED PROVIDER NOTE - NS ED MD DISPO DISCHARGE CCDA
Fluids: none   Diet: DASH diet   DVT ppx: Lovenox    GoC: full code  Dispo: Likely home
Patient/Caregiver provided printed discharge information.
Fluids: none   Diet: DASH diet   DVT ppx: Lovenox    GoC: full code  Dispo: Likely home 1/2
Fluids: none   Diet: DASH diet   DVT ppx: Lovenox    GoC: full code  Dispo: Likely home
Fluids: none   Diet: DASH diet   DVT ppx: Lovenox    GoC: full code  Dispo: Likely home 1/2
23

## 2024-10-03 NOTE — BH CONSULTATION LIAISON ASSESSMENT NOTE - NSICDXPASTMEDICALHX_GEN_ALL_CORE_FT
[facility]- Outpatient Rehabilitation and Therapy [address], [place] [address] office: [phone] fax: [phone]         Physical Therapy: TREATMENT/PROGRESS NOTE   Patient: Julianna Yen (58 y.o. female)   Examination Date: 10/03/2024   :  1966 MRN: [id number]   Visit #:  (10/1-)  Insurance Allowable Auth Needed   20  AUTH [x]Yes    []No    Insurance: Payor: BCBS / Plan: BCBS [place] PPO / Product Type: *No Product type* /   Insurance ID: [id number] - ([place] [employer])  Secondary Insurance (if applicable):    Treatment Diagnosis:     ICD-10-CM    1. Acute right-sided low back pain without sciatica  M54.50          Medical Diagnosis:  Strain of lumbar region, initial encounter [S39.012A]  Acute right-sided low back pain without sciatica [M54.50]   Referring Physician: Tigre Gatica MD  PCP: Tigre Gatica MD     Plan of care signed (Y/N): Y    Date of Patient follow up with Physician:      Plan of Care Report: NO  POC update due: (10 visits /OR AUTH LIMITS, whichever is less)  10/10/2024                                             Medical History:  Comorbidities:  Osteoporosis/Osteopenia  Depression  COVID-19  Relevant Medical History:                                          Precautions/ Contra-indications:           Latex allergy:  NO  Pacemaker:    NO  Contraindications for Manipulation: None  Date of Surgery:   Other:    Red Flags:  None    Suicide Screening:   The patient did not verbalize a primary behavioral concern, suicidal ideation, suicidal intent, or demonstrate suicidal behaviors.    Preferred Language for Healthcare:   [x] English       [] other:    SUBJECTIVE EXAMINATION   5 visits approved -    Patient stated complaint: No new issues to report today.  10/3       Test used Initial score  9/10/24 10/03/2024   Pain Summary VAS 3-4/10 0.5/10   Functional questionnaire [place] Back Pain Disability Scale 14% deficit  23% deficit   Other:    PAST MEDICAL HISTORY:  Asthma     Autism     Enuresis     Factitious disorder     GERD (gastroesophageal reflux disease)     Hypothyroidism     Intellectual disability     Mood disorder     Myopia of both eyes     Urinary retention

## 2024-10-07 ENCOUNTER — INPATIENT (INPATIENT)
Facility: HOSPITAL | Age: 34
LOS: 2 days | Discharge: HOME | DRG: 726 | End: 2024-10-10
Attending: FAMILY MEDICINE | Admitting: STUDENT IN AN ORGANIZED HEALTH CARE EDUCATION/TRAINING PROGRAM
Payer: MEDICAID

## 2024-10-07 VITALS
TEMPERATURE: 99 F | HEIGHT: 69 IN | WEIGHT: 294.98 LBS | HEART RATE: 96 BPM | RESPIRATION RATE: 18 BRPM | OXYGEN SATURATION: 96 % | DIASTOLIC BLOOD PRESSURE: 91 MMHG | SYSTOLIC BLOOD PRESSURE: 130 MMHG

## 2024-10-07 DIAGNOSIS — R33.9 RETENTION OF URINE, UNSPECIFIED: ICD-10-CM

## 2024-10-07 DIAGNOSIS — Z90.79 ACQUIRED ABSENCE OF OTHER GENITAL ORGAN(S): Chronic | ICD-10-CM

## 2024-10-07 LAB
ANION GAP SERPL CALC-SCNC: 7 MMOL/L — SIGNIFICANT CHANGE UP (ref 5–17)
BASOPHILS # BLD AUTO: 0.03 K/UL — SIGNIFICANT CHANGE UP (ref 0–0.2)
BASOPHILS NFR BLD AUTO: 0.4 % — SIGNIFICANT CHANGE UP (ref 0–2)
BUN SERPL-MCNC: 7 MG/DL — SIGNIFICANT CHANGE UP (ref 7–23)
CALCIUM SERPL-MCNC: 8.8 MG/DL — SIGNIFICANT CHANGE UP (ref 8.5–10.1)
CHLORIDE SERPL-SCNC: 107 MMOL/L — SIGNIFICANT CHANGE UP (ref 96–108)
CO2 SERPL-SCNC: 26 MMOL/L — SIGNIFICANT CHANGE UP (ref 22–31)
CREAT SERPL-MCNC: 0.94 MG/DL — SIGNIFICANT CHANGE UP (ref 0.5–1.3)
EGFR: 108 ML/MIN/1.73M2 — SIGNIFICANT CHANGE UP
EOSINOPHIL # BLD AUTO: 0.21 K/UL — SIGNIFICANT CHANGE UP (ref 0–0.5)
EOSINOPHIL NFR BLD AUTO: 2.9 % — SIGNIFICANT CHANGE UP (ref 0–6)
GLUCOSE SERPL-MCNC: 179 MG/DL — HIGH (ref 70–99)
HCT VFR BLD CALC: 39.9 % — SIGNIFICANT CHANGE UP (ref 39–50)
HGB BLD-MCNC: 12.1 G/DL — LOW (ref 13–17)
IMM GRANULOCYTES NFR BLD AUTO: 0.7 % — SIGNIFICANT CHANGE UP (ref 0–0.9)
LYMPHOCYTES # BLD AUTO: 2.62 K/UL — SIGNIFICANT CHANGE UP (ref 1–3.3)
LYMPHOCYTES # BLD AUTO: 36.1 % — SIGNIFICANT CHANGE UP (ref 13–44)
MCHC RBC-ENTMCNC: 23.5 PG — LOW (ref 27–34)
MCHC RBC-ENTMCNC: 30.3 GM/DL — LOW (ref 32–36)
MCV RBC AUTO: 77.5 FL — LOW (ref 80–100)
MONOCYTES # BLD AUTO: 0.61 K/UL — SIGNIFICANT CHANGE UP (ref 0–0.9)
MONOCYTES NFR BLD AUTO: 8.4 % — SIGNIFICANT CHANGE UP (ref 2–14)
NEUTROPHILS # BLD AUTO: 3.73 K/UL — SIGNIFICANT CHANGE UP (ref 1.8–7.4)
NEUTROPHILS NFR BLD AUTO: 51.5 % — SIGNIFICANT CHANGE UP (ref 43–77)
NRBC # BLD: 0 /100 WBCS — SIGNIFICANT CHANGE UP (ref 0–0)
PLATELET # BLD AUTO: 242 K/UL — SIGNIFICANT CHANGE UP (ref 150–400)
POTASSIUM SERPL-MCNC: 4.2 MMOL/L — SIGNIFICANT CHANGE UP (ref 3.5–5.3)
POTASSIUM SERPL-SCNC: 4.2 MMOL/L — SIGNIFICANT CHANGE UP (ref 3.5–5.3)
RBC # BLD: 5.15 M/UL — SIGNIFICANT CHANGE UP (ref 4.2–5.8)
RBC # FLD: 15 % — HIGH (ref 10.3–14.5)
SODIUM SERPL-SCNC: 140 MMOL/L — SIGNIFICANT CHANGE UP (ref 135–145)
WBC # BLD: 7.25 K/UL — SIGNIFICANT CHANGE UP (ref 3.8–10.5)
WBC # FLD AUTO: 7.25 K/UL — SIGNIFICANT CHANGE UP (ref 3.8–10.5)

## 2024-10-07 PROCEDURE — 99285 EMERGENCY DEPT VISIT HI MDM: CPT

## 2024-10-07 PROCEDURE — 99223 1ST HOSP IP/OBS HIGH 75: CPT

## 2024-10-07 RX ORDER — SODIUM CHLORIDE IRRIG SOLUTION 0.9 %
1000 SOLUTION, IRRIGATION IRRIGATION ONCE
Refills: 0 | Status: COMPLETED | OUTPATIENT
Start: 2024-10-07 | End: 2024-10-07

## 2024-10-07 RX ADMIN — Medication 1000 MILLILITER(S): at 22:40

## 2024-10-07 NOTE — ED ADULT NURSE NOTE - NSFALLUNIVINTERV_ED_ALL_ED
Bed/Stretcher in lowest position, wheels locked, appropriate side rails in place/Call bell, personal items and telephone in reach/Instruct patient to call for assistance before getting out of bed/chair/stretcher/Non-slip footwear applied when patient is off stretcher/[place] to call system/Physically safe environment - no spills, clutter or unnecessary equipment/Purposeful proactive rounding/Room/bathroom lighting operational, light cord in reach

## 2024-10-07 NOTE — ED PROVIDER NOTE - CLINICAL SUMMARY MEDICAL DECISION MAKING FREE TEXT BOX
34M PMH DM2, HTN, HLD, autism, behavioral disorder requiring inpatient psychiatric admissions and constant 1:1 observation, BPH with urinary retention, hx testicular cancer s/p b/l orchiectomy BIBEMS from [facility] young adults home for urinary retention. VS and exam as above  DDx includes but not limited to: UTI vs BPH induced retention. Will also eval for anne-marie, lyte derangements, dehydration  Plan: labs, ua/ucx, reassess symptoms    See Progress Notes for further updates on ED Course

## 2024-10-07 NOTE — ED PROVIDER NOTE - PROGRESS NOTE DETAILS
I spoke to José griffin [facility] who states he will speak to on call nurse to figure out whether or not pt can be at the facility with a catheter José from [facility] spoke to his manager who contacted the RN on call for the facility--she informed him that they cannot manage a mckeon catheter at the facility and he would need to be admitted. bladder scan had close to 400cc urine I spoke to hospitalist dr farrell who accepted admission. Pt's PCP is Dr. glen frankel. I called luc [phone] and updated her

## 2024-10-07 NOTE — ED PROVIDER NOTE - NS_EDPROVIDERDISPOUSERTYPE_ED_A_ED
----- Message from Cynthia Reyer sent at 1/3/2017 11:40 AM EST -----  Pt is asking to speak with you regarding her FMLA & when she will return to work. She asked me to please make sure you get the message because she has called several other times & you haven't got the message and/or returned her call. I explained you don't normally deal with FMLA but she said you told her to call about this so you can speak with Luis to make sure she knows what is going on. # [phone]   Attending Attestation (For Attendings USE Only)...

## 2024-10-07 NOTE — ED PROVIDER NOTE - OBJECTIVE STATEMENT
34M PMH DM2, HTN, HLD, autism, behavioral disorder requiring inpatient psychiatric admissions and constant 1:1 observation, BPH with urinary retention, hx testicular cancer s/p b/l orchiectomy BIBEMS from [facility] young adults home for urinary retention; last urination was this AM with dysuria. States he normally has these symptoms requiring a catheter. He also cannot go back to the adult home with the catheter. He has an old RUE scar from prior self injurious behavior. No current SI/HI/hallucinations

## 2024-10-07 NOTE — ED PROVIDER NOTE - NS ED ROS FT
CONST: no fevers, no chills  ENT: no sore throat  CV: no chest pain, no leg swelling  RESP: no shortness of breath, no cough  ABD: no abdominal pain, no nausea, no vomiting, no diarrhea  : +dysuria, no flank pain, no hematuria, +urinary retention  MSK: no back pain, no extremity pain  SKIN:  no rash

## 2024-10-07 NOTE — ED ADULT TRIAGE NOTE - TEMP AT ED ARRIVAL (C)
82M with a history of prostate CA, CAD, A fib on coumadin, CHF, HTN, GERD, CKD (baseline 1.35 on 2/24), s/p MVR, TV repair, recently treated for PNA, seen by PMD for SOB, was prescribed with Advair (no history of COPD) and Azithromycin.  Symptoms got worse and came to ED.  Found to be in extreme hypothermia to 90, bradycardia to 20s.  Now rewarmed, Temp 96.8, HR 58 in possible 1st degree AVB. 37

## 2024-10-07 NOTE — ED ADULT NURSE NOTE - OBJECTIVE STATEMENT
pt presents to the ED c/o difficulty urinating. pt reports the last time he urinated was in the AM. bladder scan done, pt is retaining urine >348 cc. no complaints of pain/discomfort. 16F mckeon placed as per MD fournier. + clear yellow urine output. pt tolerated well. denies any urinary symptoms. pt was seen at [facility] ed for the same issue recently. mckeon provided relief. iv lock 18g placed to R upper arm by MD fournier. patent and flushing. no s/s redness, swelling or infiltration. labs collected and sent. pending results. no acute distress @ this time.

## 2024-10-07 NOTE — ED PROVIDER NOTE - PHYSICAL EXAMINATION
Gen: NAD, non-toxic appearing, awake alert   HEENT:  normal conjunctiva, oral mucosa moist  Lung: CTAB, no respiratory distress, no wheezes/rhonchi/rales B/L, speaking in full sentences  CV: RRR  Abd: soft, suprapubic TTP, ND, no guarding, no rigidity, no rebound tenderness, no CVA tenderness   MSK: no visible deformities  Skin: Warm, well perfused, old linear superficial lac to RUE  Psych: normal affect, calm Gen: NAD, non-toxic appearing, awake alert   HEENT:  normal conjunctiva, oral mucosa moist  Lung: CTAB, no respiratory distress, no wheezes/rhonchi/rales B/L, speaking in full sentences  CV: RRR  Abd: soft, suprapubic TTP, ND, no guarding, no rigidity, no rebound tenderness, no CVA tenderness   MSK: no visible deformities  Skin: Warm, well perfused, old healing linear superficial lac to RUE  Psych: normal affect, calm

## 2024-10-07 NOTE — ED ADULT NURSE NOTE - ED STAT RN HANDOFF DETAILS
to ANALI Lauren to RN Enrique Lauren  Report given to ANALI Medel on med surg. VS stable. Called pharmacy to send 8PM meds to ED. IP RN aware will send meds w/ pt for admin on unit. 1:1 maintained. Pt will be taken by ED tech and 1:1 observer to IP room where obs will continue.

## 2024-10-08 DIAGNOSIS — F84.0 AUTISTIC DISORDER: ICD-10-CM

## 2024-10-08 DIAGNOSIS — E03.9 HYPOTHYROIDISM, UNSPECIFIED: ICD-10-CM

## 2024-10-08 DIAGNOSIS — J45.909 UNSPECIFIED ASTHMA, UNCOMPLICATED: ICD-10-CM

## 2024-10-08 DIAGNOSIS — Z85.47 PERSONAL HISTORY OF MALIGNANT NEOPLASM OF TESTIS: ICD-10-CM

## 2024-10-08 DIAGNOSIS — R33.9 RETENTION OF URINE, UNSPECIFIED: ICD-10-CM

## 2024-10-08 DIAGNOSIS — F79 UNSPECIFIED INTELLECTUAL DISABILITIES: ICD-10-CM

## 2024-10-08 DIAGNOSIS — Z29.9 ENCOUNTER FOR PROPHYLACTIC MEASURES, UNSPECIFIED: ICD-10-CM

## 2024-10-08 LAB
ALBUMIN SERPL ELPH-MCNC: 3.1 G/DL — LOW (ref 3.3–5)
ALP SERPL-CCNC: 85 U/L — SIGNIFICANT CHANGE UP (ref 40–120)
ALT FLD-CCNC: 42 U/L — SIGNIFICANT CHANGE UP (ref 12–78)
ANION GAP SERPL CALC-SCNC: 6 MMOL/L — SIGNIFICANT CHANGE UP (ref 5–17)
APPEARANCE UR: CLEAR — SIGNIFICANT CHANGE UP
AST SERPL-CCNC: 34 U/L — SIGNIFICANT CHANGE UP (ref 15–37)
BACTERIA # UR AUTO: ABNORMAL /HPF
BILIRUB SERPL-MCNC: 0.2 MG/DL — SIGNIFICANT CHANGE UP (ref 0.2–1.2)
BILIRUB UR-MCNC: NEGATIVE — SIGNIFICANT CHANGE UP
BUN SERPL-MCNC: 7 MG/DL — SIGNIFICANT CHANGE UP (ref 7–23)
CALCIUM SERPL-MCNC: 9.3 MG/DL — SIGNIFICANT CHANGE UP (ref 8.5–10.1)
CHLORIDE SERPL-SCNC: 107 MMOL/L — SIGNIFICANT CHANGE UP (ref 96–108)
CO2 SERPL-SCNC: 27 MMOL/L — SIGNIFICANT CHANGE UP (ref 22–31)
COLOR SPEC: YELLOW — SIGNIFICANT CHANGE UP
CREAT SERPL-MCNC: 0.88 MG/DL — SIGNIFICANT CHANGE UP (ref 0.5–1.3)
DIFF PNL FLD: NEGATIVE — SIGNIFICANT CHANGE UP
EGFR: 115 ML/MIN/1.73M2 — SIGNIFICANT CHANGE UP
EPI CELLS # UR: PRESENT
GLUCOSE SERPL-MCNC: 125 MG/DL — HIGH (ref 70–99)
GLUCOSE UR QL: NEGATIVE MG/DL — SIGNIFICANT CHANGE UP
HCT VFR BLD CALC: 39.9 % — SIGNIFICANT CHANGE UP (ref 39–50)
HGB BLD-MCNC: 12.7 G/DL — LOW (ref 13–17)
KETONES UR-MCNC: NEGATIVE MG/DL — SIGNIFICANT CHANGE UP
LEUKOCYTE ESTERASE UR-ACNC: NEGATIVE — SIGNIFICANT CHANGE UP
MCHC RBC-ENTMCNC: 24.5 PG — LOW (ref 27–34)
MCHC RBC-ENTMCNC: 31.8 GM/DL — LOW (ref 32–36)
MCV RBC AUTO: 77 FL — LOW (ref 80–100)
NITRITE UR-MCNC: NEGATIVE — SIGNIFICANT CHANGE UP
NRBC # BLD: 0 /100 WBCS — SIGNIFICANT CHANGE UP (ref 0–0)
PH UR: 6 — SIGNIFICANT CHANGE UP (ref 5–8)
PLATELET # BLD AUTO: 240 K/UL — SIGNIFICANT CHANGE UP (ref 150–400)
POTASSIUM SERPL-MCNC: 4.1 MMOL/L — SIGNIFICANT CHANGE UP (ref 3.5–5.3)
POTASSIUM SERPL-SCNC: 4.1 MMOL/L — SIGNIFICANT CHANGE UP (ref 3.5–5.3)
PROT SERPL-MCNC: 6.5 G/DL — SIGNIFICANT CHANGE UP (ref 6–8.3)
PROT UR-MCNC: NEGATIVE MG/DL — SIGNIFICANT CHANGE UP
RBC # BLD: 5.18 M/UL — SIGNIFICANT CHANGE UP (ref 4.2–5.8)
RBC # FLD: 14.9 % — HIGH (ref 10.3–14.5)
RBC CASTS # UR COMP ASSIST: 0 /HPF — SIGNIFICANT CHANGE UP (ref 0–4)
SODIUM SERPL-SCNC: 140 MMOL/L — SIGNIFICANT CHANGE UP (ref 135–145)
SP GR SPEC: 1 — SIGNIFICANT CHANGE UP (ref 1–1.03)
UROBILINOGEN FLD QL: 0.2 MG/DL — SIGNIFICANT CHANGE UP (ref 0.2–1)
WBC # BLD: 7.02 K/UL — SIGNIFICANT CHANGE UP (ref 3.8–10.5)
WBC # FLD AUTO: 7.02 K/UL — SIGNIFICANT CHANGE UP (ref 3.8–10.5)
WBC UR QL: 0 /HPF — SIGNIFICANT CHANGE UP (ref 0–5)

## 2024-10-08 PROCEDURE — 99233 SBSQ HOSP IP/OBS HIGH 50: CPT

## 2024-10-08 RX ORDER — BUSPIRONE HCL 5 MG
15 TABLET ORAL ONCE
Refills: 0 | Status: DISCONTINUED | OUTPATIENT
Start: 2024-10-08 | End: 2024-10-08

## 2024-10-08 RX ORDER — SODIUM CHLORIDE IRRIG SOLUTION 0.9 %
1000 SOLUTION, IRRIGATION IRRIGATION
Refills: 0 | Status: DISCONTINUED | OUTPATIENT
Start: 2024-10-08 | End: 2024-10-10

## 2024-10-08 RX ORDER — ASTEMIZOLE 10 MG
500 TABLET ORAL
Refills: 0 | Status: DISCONTINUED | OUTPATIENT
Start: 2024-10-08 | End: 2024-10-10

## 2024-10-08 RX ORDER — ALCOHOL ANTISEPTIC PADS
25 PADS, MEDICATED (EA) TOPICAL ONCE
Refills: 0 | Status: DISCONTINUED | OUTPATIENT
Start: 2024-10-08 | End: 2024-10-10

## 2024-10-08 RX ORDER — CLONIDINE HCL 0.2 MG
1 TABLET ORAL
Refills: 0 | DISCHARGE

## 2024-10-08 RX ORDER — GLUCAGON INJECTION, SOLUTION 0.5 MG/.1ML
1 INJECTION, SOLUTION SUBCUTANEOUS ONCE
Refills: 0 | Status: DISCONTINUED | OUTPATIENT
Start: 2024-10-08 | End: 2024-10-10

## 2024-10-08 RX ORDER — BUSPIRONE HCL 5 MG
15 TABLET ORAL
Refills: 0 | Status: DISCONTINUED | OUTPATIENT
Start: 2024-10-08 | End: 2024-10-10

## 2024-10-08 RX ORDER — CLONIDINE HYDROCHLORIDE 0.2 MG/1
0.3 TABLET ORAL EVERY 12 HOURS
Refills: 0 | Status: DISCONTINUED | OUTPATIENT
Start: 2024-10-08 | End: 2024-10-10

## 2024-10-08 RX ORDER — TESTOSTERONE 16.2 MG/G
50 GEL TRANSDERMAL DAILY
Refills: 0 | Status: DISCONTINUED | OUTPATIENT
Start: 2024-10-08 | End: 2024-10-10

## 2024-10-08 RX ORDER — MIRTAZAPINE 30 MG/1
22.5 TABLET, FILM COATED ORAL AT BEDTIME
Refills: 0 | Status: DISCONTINUED | OUTPATIENT
Start: 2024-10-08 | End: 2024-10-10

## 2024-10-08 RX ORDER — PANTOPRAZOLE SODIUM 40 MG/1
40 TABLET, DELAYED RELEASE ORAL
Refills: 0 | Status: DISCONTINUED | OUTPATIENT
Start: 2024-10-08 | End: 2024-10-10

## 2024-10-08 RX ORDER — INFLUENZA VIRUS VACCINE 15; 15; 15; 15 UG/.5ML; UG/.5ML; UG/.5ML; UG/.5ML
0.5 SUSPENSION INTRAMUSCULAR ONCE
Refills: 0 | Status: DISCONTINUED | OUTPATIENT
Start: 2024-10-08 | End: 2024-10-10

## 2024-10-08 RX ORDER — INSULIN LISPRO 100/ML
VIAL (ML) SUBCUTANEOUS
Refills: 0 | Status: DISCONTINUED | OUTPATIENT
Start: 2024-10-08 | End: 2024-10-10

## 2024-10-08 RX ORDER — INSULIN LISPRO 100/ML
VIAL (ML) SUBCUTANEOUS AT BEDTIME
Refills: 0 | Status: DISCONTINUED | OUTPATIENT
Start: 2024-10-08 | End: 2024-10-10

## 2024-10-08 RX ORDER — ATORVASTATIN CALCIUM 10 MG/1
10 TABLET, FILM COATED ORAL AT BEDTIME
Refills: 0 | Status: DISCONTINUED | OUTPATIENT
Start: 2024-10-08 | End: 2024-10-10

## 2024-10-08 RX ORDER — MIRTAZAPINE 30 MG/1
30 TABLET, FILM COATED ORAL AT BEDTIME
Refills: 0 | Status: DISCONTINUED | OUTPATIENT
Start: 2024-10-08 | End: 2024-10-08

## 2024-10-08 RX ORDER — SENNA 187 MG
2 TABLET ORAL
Refills: 0 | DISCHARGE

## 2024-10-08 RX ORDER — TAMSULOSIN HCL 0.4 MG
0.4 CAPSULE ORAL AT BEDTIME
Refills: 0 | Status: DISCONTINUED | OUTPATIENT
Start: 2024-10-08 | End: 2024-10-09

## 2024-10-08 RX ORDER — ENOXAPARIN SODIUM 150 MG/ML
40 INJECTION SUBCUTANEOUS EVERY 12 HOURS
Refills: 0 | Status: DISCONTINUED | OUTPATIENT
Start: 2024-10-08 | End: 2024-10-10

## 2024-10-08 RX ORDER — PANTOPRAZOLE SODIUM 40 MG
1 TABLET, DELAYED RELEASE (ENTERIC COATED) ORAL
Refills: 0 | DISCHARGE

## 2024-10-08 RX ORDER — ACETAMINOPHEN 325 MG
650 TABLET ORAL EVERY 6 HOURS
Refills: 0 | Status: DISCONTINUED | OUTPATIENT
Start: 2024-10-08 | End: 2024-10-10

## 2024-10-08 RX ORDER — ASTEMIZOLE 10 MG
1000 TABLET ORAL
Refills: 0 | Status: DISCONTINUED | OUTPATIENT
Start: 2024-10-08 | End: 2024-10-10

## 2024-10-08 RX ORDER — MIRTAZAPINE 30 MG/1
7.5 TABLET, FILM COATED ORAL AT BEDTIME
Refills: 0 | Status: DISCONTINUED | OUTPATIENT
Start: 2024-10-08 | End: 2024-10-08

## 2024-10-08 RX ORDER — ALCOHOL ANTISEPTIC PADS
15 PADS, MEDICATED (EA) TOPICAL ONCE
Refills: 0 | Status: DISCONTINUED | OUTPATIENT
Start: 2024-10-08 | End: 2024-10-10

## 2024-10-08 RX ORDER — DIVALPROEX SODIUM 125 MG/1
2 CAPSULE, DELAYED RELEASE ORAL
Refills: 0 | DISCHARGE

## 2024-10-08 RX ORDER — SENNOSIDES 8.6 MG
2 TABLET ORAL AT BEDTIME
Refills: 0 | Status: DISCONTINUED | OUTPATIENT
Start: 2024-10-08 | End: 2024-10-10

## 2024-10-08 RX ORDER — ALCOHOL ANTISEPTIC PADS
12.5 PADS, MEDICATED (EA) TOPICAL ONCE
Refills: 0 | Status: DISCONTINUED | OUTPATIENT
Start: 2024-10-08 | End: 2024-10-10

## 2024-10-08 RX ADMIN — Medication 4 MILLIGRAM(S): at 01:30

## 2024-10-08 RX ADMIN — Medication 2 TABLET(S): at 21:05

## 2024-10-08 RX ADMIN — TESTOSTERONE 50 MILLIGRAM(S): 16.2 GEL TRANSDERMAL at 12:32

## 2024-10-08 RX ADMIN — Medication 1000 MILLIGRAM(S): at 21:04

## 2024-10-08 RX ADMIN — Medication 500 MILLIGRAM(S): at 14:21

## 2024-10-08 RX ADMIN — ENOXAPARIN SODIUM 40 MILLIGRAM(S): 150 INJECTION SUBCUTANEOUS at 18:22

## 2024-10-08 RX ADMIN — MIRTAZAPINE 30 MILLIGRAM(S): 30 TABLET, FILM COATED ORAL at 01:30

## 2024-10-08 RX ADMIN — ATORVASTATIN CALCIUM 10 MILLIGRAM(S): 10 TABLET, FILM COATED ORAL at 21:04

## 2024-10-08 RX ADMIN — Medication 4 MILLIGRAM(S): at 21:05

## 2024-10-08 RX ADMIN — CLONIDINE HYDROCHLORIDE 0.3 MILLIGRAM(S): 0.2 TABLET ORAL at 18:21

## 2024-10-08 RX ADMIN — ENOXAPARIN SODIUM 40 MILLIGRAM(S): 150 INJECTION SUBCUTANEOUS at 06:59

## 2024-10-08 RX ADMIN — Medication 15 MILLIGRAM(S): at 21:04

## 2024-10-08 RX ADMIN — MIRTAZAPINE 22.5 MILLIGRAM(S): 30 TABLET, FILM COATED ORAL at 21:05

## 2024-10-08 RX ADMIN — Medication 1000 MILLIGRAM(S): at 01:30

## 2024-10-08 RX ADMIN — Medication 50 MICROGRAM(S): at 06:59

## 2024-10-08 RX ADMIN — Medication 0.4 MILLIGRAM(S): at 21:04

## 2024-10-08 NOTE — H&P ADULT - PROBLEM SELECTOR PLAN 1
bladder scan: around 400 cc/hr   hx of urinary retention likely 2/2 BPH bladder scan: around 400 cc/hr   hx of urinary retention likely 2/2 BPH  continue tamsulosin 0.4 mg oral    plan  mckeon cath placed-> resolution of retention  Day team to consult Urology, as this is a problem that occurs intermittently  likely dispo to a different living facility upon discharge as patient's facility doesn't have capability to manage mckeon cath

## 2024-10-08 NOTE — H&P ADULT - PROBLEM SELECTOR PLAN 2
no current suicidal ideations, homicidal ideations.  tried hurting himself this morning, and refused taking his meds in AM  1:1 Observation, Nursing supervisor notified that 1:1 is needed  Psych AM consult by day team  restart home meds-> buspirone, clonidine, depakote, mirtazipine, risperidone

## 2024-10-08 NOTE — H&P ADULT - HISTORY OF PRESENT ILLNESS
34M PMH of Bipolar Disorder, BPH, ADHD, MR, Hypothyroidism, Asthma, DM2, HTN, HLD, autism, behavioral disorder requiring previous inpatient psychiatric admissions and constant 1:1 observation, BPH with urinary retention, hx testicular cancer s/p b/l orchiectomy BIBEMS from [facility] resides at [facility] in [place] comes in  for urinary retention; last urination was this AM with dysuria. States he normally has these symptoms requiring a catheter. ED provider spoke with adult home supervisor KAYLIE ([phone]), and she states the group home does not have the resources to take care and manage the mckeon cath. In addition, patient has an old RUE scar from prior self injurious behavior from last week. Patient states he had thoughts of hurting himself this AM; however currently does not have any SI/HI/hallucinations.    In ED:    Vitals T 98.1  HR 70  /64  RR 17 O2S96%    Meds/fluids administered-  1L LR bolus,    Significant Labs:  Glucose of 179    Imaging Done:   Bladder Scan: >400 cc of retained urine,

## 2024-10-08 NOTE — H&P ADULT - ASSESSMENT
34M PMH of Bipolar Disorder, BPH, ADHD, MR, Hypothyroidism, Asthma, DM2, HTN, HLD, autism, behavioral disorder requiring previous inpatient psychiatric admissions and constant 1:1 observation, BPH with urinary retention, hx testicular cancer s/p b/l orchiectomy BIBEMS admitted for urinary retention requiring mckeon placment which cannot be managed at [facility] in [place] by [facility].

## 2024-10-08 NOTE — ED ADULT NURSE REASSESSMENT NOTE - NS ED NURSE REASSESS COMMENT FT1
Report rec'd for pt in ED awaiting report for IP bed. Pt A/O at baseline, 1:1 maintained. NAD at this time. Pt reports rash by his neck and c/o itchiness. Will advise RN and provider when giving report. Noted slight irritation to area. Bed in lowest position, safety maintained, nursing care ongoing.

## 2024-10-08 NOTE — PATIENT PROFILE ADULT - FALL HARM RISK - UNIVERSAL INTERVENTIONS
Bed in lowest position, wheels locked, appropriate side rails in place/Call bell, personal items and telephone in reach/Instruct patient to call for assistance before getting out of bed or chair/Non-slip footwear when patient is out of bed/[place] to call system/Physically safe environment - no spills, clutter or unnecessary equipment/Purposeful Proactive Rounding/Room/bathroom lighting operational, light cord in reach

## 2024-10-08 NOTE — ED ADULT NURSE REASSESSMENT NOTE - NS ED NURSE REASSESS COMMENT FT1
pt aide states he brought a list of home meds that the hospitalist was waiting for. informed MD Debbie Begum who states they will be down to receive them shortly

## 2024-10-08 NOTE — CARE COORDINATION ASSESSMENT. - OTHER PERTINENT DISCHARGE PLANNING INFORMATION:
FRAN spoke to group home staff member KAYLIE via p#[phone]. FRAN completed CC on the phone with KAYLIE. KAYLIE stated that when pt is ready for d/c, to call the group home as the pt is not allowed to leave by himself. KAYLIE provided pt's pharmacy: [facility] in [place]. [facility] to follow and remain available for any needs

## 2024-10-08 NOTE — PATIENT PROFILE ADULT - FUNCTIONAL ASSESSMENT - BASIC MOBILITY 6.
3-calculated by average/Not able to assess (calculate score using [facility] averaging method)

## 2024-10-08 NOTE — H&P ADULT - NSHPREVIEWOFSYSTEMS_GEN_ALL_CORE
CONSTITUTIONAL: denies fever, chills, fatigue, weakness  HEENT: denies blurred vision, sore throat  CARDIOVASCULAR: denies current chest pain, chest pressure, palpitations  RESPIRATORY: denies shortness of breath, sputum production  GASTROINTESTINAL: denies nausea, vomiting, diarrhea, abdominal pain  GENITOURINARY: denies dysuria, discharge  NEUROLOGICAL: denies numbness, headache, focal weakness  MUSCULOSKELETAL: denies new joint pain, muscle aches  PSYCHIATRIC: states he has hx of suicidal ideations, in AM he had thoughts of hurting himself and tried opening up an old RUE wrist wound; however currently no plan of suicidal/ homicidal ideations.

## 2024-10-08 NOTE — ED ADULT NURSE REASSESSMENT NOTE - NS ED NURSE REASSESS COMMENT FT1
Report given to ANALI Medel on med surg. VS stable. Called pharmacy to send 8PM meds to ED. IP RN aware will send meds w/ pt for admin on unit. 1:1 maintained. Pt will be taken by ED tech and 1:1 observer to IP room where obs will continue.

## 2024-10-08 NOTE — ED ADULT NURSE REASSESSMENT NOTE - NS ED NURSE REASSESS COMMENT FT1
pt states he has suicidal ideation, placed on 1:1 constant observation w/ ed tech JO-ANN. no acute distress @ this time. will continue to monitor.

## 2024-10-08 NOTE — CARE COORDINATION ASSESSMENT. - NSCAREPROVIDERS_GEN_ALL_CORE_FT
CARE PROVIDERS:  Accepting Physician: Cameron Rodriguez  Administration: Selene Gallagher  Administration: Sisi Magdaleno  Administration: Andreas Hodge  Administration: Kisha Arredondo  Administration: Rosemary Simpson  Admitting: Cameron Rodriguez  Attending: Debbie Begum Team: Balbina Sanford  ED Attending: Hermann Herrera  ED Nurse: Jeannette Siddiqui  Intern: Swati Cortez  Nurse: Chiquita Flower  Nurse: Bryon Benitez  Oncology: Tracey Johnston  Ordered: Doctor, Unknown  Outpatient Provider: Marco Paul  PCA/Nursing Assistant: Casandra Nix  PCA/Nursing Assistant: Bety Lynch  PCA/Nursing Assistant: Vania Colbert  Primary Team: Cameron Rodriguez  : Jackie Steven  Team: [facility], Team  UR// Supp. Assoc.: Meryl Fuentes   CARE PROVIDERS:  Accepting Physician: Cameron Rodriguez  Administration: Rosemary Simpson  Administration: Selene Gallagher  Administration: Sisi Magdaleno  Administration: Andreas Hodge  Administration: Kisha Arredondo  Admitting: Cameron Rodriguez  Attending: Debbie Begum Team: Balbina Sanford  ED Attending: Hermann Herrera  ED Nurse: Jeannette Siddiqui  Intern: Swati Cortez  Nurse: Chiquita Flower  Nurse: Bryon Benitez  Oncology: Tracey Johnston  Ordered: Doctor, Unknown  Outpatient Provider: Marco Paul  PCA/Nursing Assistant: Casandra Nix  PCA/Nursing Assistant: Jesse Wetzel  PCA/Nursing Assistant: Bety Lynch  PCA/Nursing Assistant: Vania Colbert  Primary Team: Cameron Rodriguez  : Mariposa White  : Jackie Steven  Team: [facility], Team  UR// Supp. Assoc.: Meryl Fuentes

## 2024-10-08 NOTE — H&P ADULT - PROBLEM SELECTOR PLAN 8
s/p b/l orchiectomy.  on home dose testosterone 1% gel-> ordered home med of testosterone 1% gel-> hospital doesn't have gel in stock  will follow up with pharmacy

## 2024-10-09 LAB
ALBUMIN SERPL ELPH-MCNC: 3.3 G/DL — SIGNIFICANT CHANGE UP (ref 3.3–5)
ALP SERPL-CCNC: 93 U/L — SIGNIFICANT CHANGE UP (ref 40–120)
ALT FLD-CCNC: 49 U/L — SIGNIFICANT CHANGE UP (ref 12–78)
ANION GAP SERPL CALC-SCNC: 11 MMOL/L — SIGNIFICANT CHANGE UP (ref 5–17)
AST SERPL-CCNC: 31 U/L — SIGNIFICANT CHANGE UP (ref 15–37)
BILIRUB SERPL-MCNC: 0.4 MG/DL — SIGNIFICANT CHANGE UP (ref 0.2–1.2)
BUN SERPL-MCNC: 13 MG/DL — SIGNIFICANT CHANGE UP (ref 7–23)
CALCIUM SERPL-MCNC: 9.5 MG/DL — SIGNIFICANT CHANGE UP (ref 8.5–10.1)
CHLORIDE SERPL-SCNC: 101 MMOL/L — SIGNIFICANT CHANGE UP (ref 96–108)
CO2 SERPL-SCNC: 24 MMOL/L — SIGNIFICANT CHANGE UP (ref 22–31)
CREAT SERPL-MCNC: 0.92 MG/DL — SIGNIFICANT CHANGE UP (ref 0.5–1.3)
EGFR: 111 ML/MIN/1.73M2 — SIGNIFICANT CHANGE UP
GLUCOSE SERPL-MCNC: 142 MG/DL — HIGH (ref 70–99)
HCT VFR BLD CALC: 43.7 % — SIGNIFICANT CHANGE UP (ref 39–50)
HGB BLD-MCNC: 13.3 G/DL — SIGNIFICANT CHANGE UP (ref 13–17)
MCHC RBC-ENTMCNC: 23.5 PG — LOW (ref 27–34)
MCHC RBC-ENTMCNC: 30.4 GM/DL — LOW (ref 32–36)
MCV RBC AUTO: 77.1 FL — LOW (ref 80–100)
NRBC # BLD: 0 /100 WBCS — SIGNIFICANT CHANGE UP (ref 0–0)
PLATELET # BLD AUTO: 256 K/UL — SIGNIFICANT CHANGE UP (ref 150–400)
POTASSIUM SERPL-MCNC: 4.7 MMOL/L — SIGNIFICANT CHANGE UP (ref 3.5–5.3)
POTASSIUM SERPL-SCNC: 4.7 MMOL/L — SIGNIFICANT CHANGE UP (ref 3.5–5.3)
PROT SERPL-MCNC: 7.2 G/DL — SIGNIFICANT CHANGE UP (ref 6–8.3)
RBC # BLD: 5.67 M/UL — SIGNIFICANT CHANGE UP (ref 4.2–5.8)
RBC # FLD: 15.1 % — HIGH (ref 10.3–14.5)
SODIUM SERPL-SCNC: 136 MMOL/L — SIGNIFICANT CHANGE UP (ref 135–145)
WBC # BLD: 6.68 K/UL — SIGNIFICANT CHANGE UP (ref 3.8–10.5)
WBC # FLD AUTO: 6.68 K/UL — SIGNIFICANT CHANGE UP (ref 3.8–10.5)

## 2024-10-09 PROCEDURE — 99221 1ST HOSP IP/OBS SF/LOW 40: CPT

## 2024-10-09 PROCEDURE — 99233 SBSQ HOSP IP/OBS HIGH 50: CPT

## 2024-10-09 RX ORDER — TAMSULOSIN HCL 0.4 MG
0.8 CAPSULE ORAL AT BEDTIME
Refills: 0 | Status: DISCONTINUED | OUTPATIENT
Start: 2024-10-09 | End: 2024-10-10

## 2024-10-09 RX ADMIN — ATORVASTATIN CALCIUM 10 MILLIGRAM(S): 10 TABLET, FILM COATED ORAL at 21:27

## 2024-10-09 RX ADMIN — ENOXAPARIN SODIUM 40 MILLIGRAM(S): 150 INJECTION SUBCUTANEOUS at 05:34

## 2024-10-09 RX ADMIN — Medication 4 MILLIGRAM(S): at 13:40

## 2024-10-09 RX ADMIN — Medication 15 MILLIGRAM(S): at 19:33

## 2024-10-09 RX ADMIN — Medication 4 MILLIGRAM(S): at 21:26

## 2024-10-09 RX ADMIN — CLONIDINE HYDROCHLORIDE 0.3 MILLIGRAM(S): 0.2 TABLET ORAL at 17:27

## 2024-10-09 RX ADMIN — MIRTAZAPINE 22.5 MILLIGRAM(S): 30 TABLET, FILM COATED ORAL at 21:26

## 2024-10-09 RX ADMIN — Medication 500 MILLIGRAM(S): at 13:40

## 2024-10-09 RX ADMIN — TESTOSTERONE 50 MILLIGRAM(S): 16.2 GEL TRANSDERMAL at 13:40

## 2024-10-09 RX ADMIN — Medication 2 TABLET(S): at 21:26

## 2024-10-09 RX ADMIN — PANTOPRAZOLE SODIUM 40 MILLIGRAM(S): 40 TABLET, DELAYED RELEASE ORAL at 05:33

## 2024-10-09 RX ADMIN — Medication 50 MICROGRAM(S): at 05:33

## 2024-10-09 RX ADMIN — CLONIDINE HYDROCHLORIDE 0.3 MILLIGRAM(S): 0.2 TABLET ORAL at 05:33

## 2024-10-09 RX ADMIN — Medication 1000 MILLIGRAM(S): at 19:33

## 2024-10-09 RX ADMIN — Medication 0.8 MILLIGRAM(S): at 21:27

## 2024-10-09 RX ADMIN — ENOXAPARIN SODIUM 40 MILLIGRAM(S): 150 INJECTION SUBCUTANEOUS at 17:27

## 2024-10-09 NOTE — PROGRESS NOTE ADULT - SUBJECTIVE AND OBJECTIVE BOX
Patient is a 34y old  Male who presents with a chief complaint of unable to urinate (08 Oct 2024 02:06)    INTERVAL HPI/OVERNIGHT EVENTS: Patient was seen and examined. Mckeon inserted secondary to retention. Denies any pain. afebrile.     I&O's Summary      LABS:                        12.7   7.02  )-----------( 240      ( 08 Oct 2024 04:59 )             39.9     10-08    140  |  107  |  7   ----------------------------<  125[H]  4.1   |  27  |  0.88    Ca    9.3      08 Oct 2024 04:59    TPro  6.5  /  Alb  3.1[L]  /  TBili  0.2  /  DBili  x   /  AST  34  /  ALT  42  /  AlkPhos  85  10-08      Urinalysis Basic - ( 08 Oct 2024 04:59 )    Color: x / Appearance: x / SG: x / pH: x  Gluc: 125 mg/dL / Ketone: x  / Bili: x / Urobili: x   Blood: x / Protein: x / Nitrite: x   Leuk Esterase: x / RBC: x / WBC x   Sq Epi: x / Non Sq Epi: x / Bacteria: x      CAPILLARY BLOOD GLUCOSE      POCT Blood Glucose.: 134 mg/dL (08 Oct 2024 12:20)  POCT Blood Glucose.: 116 mg/dL (08 Oct 2024 07:29)        Urinalysis Basic - ( 08 Oct 2024 04:59 )    Color: x / Appearance: x / SG: x / pH: x  Gluc: 125 mg/dL / Ketone: x  / Bili: x / Urobili: x   Blood: x / Protein: x / Nitrite: x   Leuk Esterase: x / RBC: x / WBC x   Sq Epi: x / Non Sq Epi: x / Bacteria: x        MEDICATIONS  (STANDING):  atorvastatin 10 milliGRAM(s) Oral at bedtime  busPIRone 15 milliGRAM(s) Oral <User Schedule>  cloNIDine 0.3 milliGRAM(s) Oral every 12 hours  dextrose 5%. 1000 milliLiter(s) (100 mL/Hr) IV Continuous <Continuous>  dextrose 5%. 1000 milliLiter(s) (50 mL/Hr) IV Continuous <Continuous>  dextrose 50% Injectable 25 Gram(s) IV Push once  dextrose 50% Injectable 12.5 Gram(s) IV Push once  dextrose 50% Injectable 25 Gram(s) IV Push once  divalproex ER 1000 milliGRAM(s) Oral <User Schedule>  divalproex  milliGRAM(s) Oral <User Schedule>  enoxaparin Injectable 40 milliGRAM(s) SubCutaneous every 12 hours  glucagon  Injectable 1 milliGRAM(s) IntraMuscular once  insulin lispro (ADMELOG) corrective regimen sliding scale   SubCutaneous three times a day before meals  insulin lispro (ADMELOG) corrective regimen sliding scale   SubCutaneous at bedtime  levothyroxine 50 MICROGram(s) Oral daily  mirtazapine 7.5 milliGRAM(s) Oral at bedtime  mirtazapine 15 milliGRAM(s) Oral at bedtime  pantoprazole    Tablet 40 milliGRAM(s) Oral before breakfast  risperiDONE   Tablet 4 milliGRAM(s) Oral <User Schedule>  senna 2 Tablet(s) Oral at bedtime  tamsulosin 0.4 milliGRAM(s) Oral at bedtime  testosterone 1% Gel 50 milliGRAM(s) Topical daily    MEDICATIONS  (PRN):  acetaminophen     Tablet .. 650 milliGRAM(s) Oral every 6 hours PRN Temp greater or equal to 38C (100.4F), Mild Pain (1 - 3)  dextrose Oral Gel 15 Gram(s) Oral once PRN Blood Glucose LESS THAN 70 milliGRAM(s)/deciliter      REVIEW OF SYSTEMS:  CONSTITUTIONAL: No fever or chills  HEENT:  No headache  RESPIRATORY: No cough or shortness of breath  CARDIOVASCULAR: No chest pain  GASTROINTESTINAL: No abdominal pain, nausea, vomiting, or diarrhea  GENITOURINARY: No dysuria, + retention   NEUROLOGICAL: no focal weakness   MUSCULOSKELETAL: no myalgias  PSYCH: no recent changes in mood    RADIOLOGY & ADDITIONAL TESTS:    Imaging Personally Reviewed:  [x] YES  [ ] NO    Consultant(s) Notes Reviewed:  [x] YES  [ ] NO    PHYSICAL EXAM:  T(C): 36.9 (10-08-24 @ 13:48), Max: 37 (10-07-24 @ 20:49)  HR: 85 (10-08-24 @ 13:48) (68 - 96)  BP: 117/68 (10-08-24 @ 13:48) (107/71 - 138/86)  RR: 17 (10-08-24 @ 13:48) (17 - 18)  SpO2: 99% (10-08-24 @ 13:48) (96% - 99%)    GENERAL: awake, oriented  HEENT:  anicteric, moist mucous membranes  CHEST/LUNG:  CTA b/l, no rales, wheezes, or rhonchi  HEART:  RRR, S1, S2  ABDOMEN:  BS+, soft, nontender, nondistended, + mckeon   EXTREMITIES: no edema  NERVOUS SYSTEM: answers questions and follows commands appropriately  PSYCH: normal affect  healing right wrist scar.     Care Discussed with Consultants/Other Providers [x] YES  [ ] NO
INTERVAL HPI/OVERNIGHT EVENTS:  Patient seen and examined at bedside. States he is feeling well, mckeon removed this AM and patient voided. Patient denies any dysuria/hematuria. Patient currently denies any suicidal/homicidal ideations. Denies any auditory/visual hallucinations at this time, but states he was experiencing auditory hallucinations at home. Patient also noted to have laceration on R forearm- states he injured himself on purpose due to "stress". Denies any chest pain, SOB, abd pain at this time.    ROS: All other review of systems is negative unless indicated above.    MEDICATIONS  (STANDING):  atorvastatin 10 milliGRAM(s) Oral at bedtime  busPIRone 15 milliGRAM(s) Oral <User Schedule>  cloNIDine 0.3 milliGRAM(s) Oral every 12 hours  dextrose 5%. 1000 milliLiter(s) (100 mL/Hr) IV Continuous <Continuous>  dextrose 5%. 1000 milliLiter(s) (50 mL/Hr) IV Continuous <Continuous>  dextrose 50% Injectable 25 Gram(s) IV Push once  dextrose 50% Injectable 25 Gram(s) IV Push once  dextrose 50% Injectable 12.5 Gram(s) IV Push once  divalproex ER 1000 milliGRAM(s) Oral <User Schedule>  divalproex  milliGRAM(s) Oral <User Schedule>  enoxaparin Injectable 40 milliGRAM(s) SubCutaneous every 12 hours  glucagon  Injectable 1 milliGRAM(s) IntraMuscular once  influenza   Vaccine 0.5 milliLiter(s) IntraMuscular once  insulin lispro (ADMELOG) corrective regimen sliding scale   SubCutaneous three times a day before meals  insulin lispro (ADMELOG) corrective regimen sliding scale   SubCutaneous at bedtime  levothyroxine 50 MICROGram(s) Oral daily  mirtazapine 22.5 milliGRAM(s) Oral at bedtime  pantoprazole    Tablet 40 milliGRAM(s) Oral before breakfast  risperiDONE   Tablet 4 milliGRAM(s) Oral <User Schedule>  senna 2 Tablet(s) Oral at bedtime  tamsulosin 0.8 milliGRAM(s) Oral at bedtime  testosterone 1% Gel 50 milliGRAM(s) Topical daily    MEDICATIONS  (PRN):  acetaminophen     Tablet .. 650 milliGRAM(s) Oral every 6 hours PRN Temp greater or equal to 38C (100.4F), Mild Pain (1 - 3)  dextrose Oral Gel 15 Gram(s) Oral once PRN Blood Glucose LESS THAN 70 milliGRAM(s)/deciliter      Allergies    Zyprexa (Dystonic RXN)  Zyprexa (Unknown)  Celebrex (Short breath)  Haldol (Rash)  Bee stings (Unknown)  Haldol (Other)    Intolerances            Vital Signs Last 24 Hrs  T(C): 36.6 (09 Oct 2024 20:25), Max: 36.9 (09 Oct 2024 05:29)  T(F): 97.8 (09 Oct 2024 20:25), Max: 98.5 (09 Oct 2024 05:29)  HR: 89 (09 Oct 2024 20:25) (62 - 89)  BP: 96/68 (09 Oct 2024 20:25) (96/68 - 123/86)  BP(mean): --  RR: 18 (09 Oct 2024 20:25) (18 - 18)  SpO2: 91% (09 Oct 2024 20:25) (91% - 94%)    Parameters below as of 09 Oct 2024 20:25  Patient On (Oxygen Delivery Method): room air        10-08 @ 07:01  -  10-09 @ 07:00  --------------------------------------------------------  IN: 0 mL / OUT: 300 mL / NET: -300 mL    10-09 @ 07:01  -  10-09 @ 21:05  --------------------------------------------------------  IN: 0 mL / OUT: 1670 mL / NET: -1670 mL      Physical Exam:  General: laying comfortably in bed, NAD  Neurology: A&Ox3, nonfocal, SALAS x 4  Respiratory: CTA B/L, no w/r/r  CV: RRR, S1S2, no murmurs, rubs or gallops  Abdominal: Soft, NT, ND +BS  Extremities: +healing laceration RUE- no erythema/swelling, + peripheral pulses      LABS:                        13.3   6.68  )-----------( 256      ( 09 Oct 2024 07:29 )             43.7     10-09    136  |  101  |  13  ----------------------------<  142[H]  4.7   |  24  |  0.92    Ca    9.5      09 Oct 2024 07:29    TPro  7.2  /  Alb  3.3  /  TBili  0.4  /  DBili  x   /  AST  31  /  ALT  49  /  AlkPhos  93  10-09      Urinalysis Basic - ( 09 Oct 2024 07:29 )    Color: x / Appearance: x / SG: x / pH: x  Gluc: 142 mg/dL / Ketone: x  / Bili: x / Urobili: x   Blood: x / Protein: x / Nitrite: x   Leuk Esterase: x / RBC: x / WBC x   Sq Epi: x / Non Sq Epi: x / Bacteria: x        RADIOLOGY & ADDITIONAL TESTS:

## 2024-10-09 NOTE — BH CONSULTATION LIAISON ASSESSMENT NOTE - NSBHCHARTREVIEWVS_PSY_A_CORE FT
Vital Signs Last 24 Hrs  T(C): 36.4 (09 Oct 2024 12:15), Max: 36.9 (09 Oct 2024 05:29)  T(F): 97.5 (09 Oct 2024 12:15), Max: 98.5 (09 Oct 2024 05:29)  HR: 81 (09 Oct 2024 12:15) (62 - 86)  BP: 123/86 (09 Oct 2024 12:15) (100/69 - 123/86)  BP(mean): --  RR: 18 (09 Oct 2024 12:15) (17 - 18)  SpO2: 93% (09 Oct 2024 12:15) (93% - 95%)    Parameters below as of 09 Oct 2024 12:15  Patient On (Oxygen Delivery Method): room air

## 2024-10-09 NOTE — BH CONSULTATION LIAISON ASSESSMENT NOTE - CURRENT MEDICATION
MEDICATIONS  (STANDING):  atorvastatin 10 milliGRAM(s) Oral at bedtime  busPIRone 15 milliGRAM(s) Oral <User Schedule>  cloNIDine 0.3 milliGRAM(s) Oral every 12 hours  dextrose 5%. 1000 milliLiter(s) (100 mL/Hr) IV Continuous <Continuous>  dextrose 5%. 1000 milliLiter(s) (50 mL/Hr) IV Continuous <Continuous>  dextrose 50% Injectable 25 Gram(s) IV Push once  dextrose 50% Injectable 12.5 Gram(s) IV Push once  dextrose 50% Injectable 25 Gram(s) IV Push once  divalproex  milliGRAM(s) Oral <User Schedule>  divalproex ER 1000 milliGRAM(s) Oral <User Schedule>  enoxaparin Injectable 40 milliGRAM(s) SubCutaneous every 12 hours  glucagon  Injectable 1 milliGRAM(s) IntraMuscular once  influenza   Vaccine 0.5 milliLiter(s) IntraMuscular once  insulin lispro (ADMELOG) corrective regimen sliding scale   SubCutaneous three times a day before meals  insulin lispro (ADMELOG) corrective regimen sliding scale   SubCutaneous at bedtime  levothyroxine 50 MICROGram(s) Oral daily  mirtazapine 22.5 milliGRAM(s) Oral at bedtime  pantoprazole    Tablet 40 milliGRAM(s) Oral before breakfast  risperiDONE   Tablet 4 milliGRAM(s) Oral <User Schedule>  senna 2 Tablet(s) Oral at bedtime  tamsulosin 0.8 milliGRAM(s) Oral at bedtime  testosterone 1% Gel 50 milliGRAM(s) Topical daily    MEDICATIONS  (PRN):  acetaminophen     Tablet .. 650 milliGRAM(s) Oral every 6 hours PRN Temp greater or equal to 38C (100.4F), Mild Pain (1 - 3)  dextrose Oral Gel 15 Gram(s) Oral once PRN Blood Glucose LESS THAN 70 milliGRAM(s)/deciliter

## 2024-10-09 NOTE — PROGRESS NOTE ADULT - TIME BILLING
Reviewing chart notes and data, face to face time counseling the patient, coordinating care with SW/CM at [facility].

## 2024-10-09 NOTE — BH CONSULTATION LIAISON ASSESSMENT NOTE - NSBHCHARTREVIEWINVESTIGATE_PSY_A_CORE FT
< from: 12 Lead ECG (09.28.24 @ 04:31) >    Ventricular Rate 75 BPM    Atrial Rate 75 BPM    P-R Interval 148 ms    QRS Duration 96 ms    Q-T Interval 388 ms    QTC Calculation(Bazett) 433 ms    P Axis 23 degrees    R Axis 42 degrees    T Axis 9 degrees    Diagnosis Line Normal sinus rhythm  Confirmed by Fanny Miller ([id number]) on 9/28/2024 12:01:04 PM    < end of copied text >

## 2024-10-09 NOTE — BH CONSULTATION LIAISON ASSESSMENT NOTE - NSBHCHARTREVIEWLAB_PSY_A_CORE FT
13.3   6.68  )-----------( 256      ( 09 Oct 2024 07:29 )             43.7   10-09    136  |  101  |  13  ----------------------------<  142[H]  4.7   |  24  |  0.92    Ca    9.5      09 Oct 2024 07:29    TPro  7.2  /  Alb  3.3  /  TBili  0.4  /  DBili  x   /  AST  31  /  ALT  49  /  AlkPhos  93  10-09

## 2024-10-09 NOTE — BH CONSULTATION LIAISON ASSESSMENT NOTE - HPI (INCLUDE ILLNESS QUALITY, SEVERITY, DURATION, TIMING, CONTEXT, MODIFYING FACTORS, ASSOCIATED SIGNS AND SYMPTOMS)
Patient seen, evaluated and chart reviewed. Patient is a 35 y/o SWM PMH of Bipolar Disorder, BPH, ADHD, MR, Hypothyroidism, Asthma, DM2, HTN, HLD, autism, behavioral disorder requiring previous inpatient psychiatric admissions and constant 1:1 observation, BPH with urinary retention, hx testicular cancer s/p b/l orchiectomy BIBEMS from [facility] young adults home resides at [facility] in [place] comes in  for urinary retention; last urination was this AM with dysuria. States he normally has these symptoms requiring a catheter. ED provider spoke with adult home supervisor KAYLIE ([phone]), and she states the group home does not have the resources to take care and manage the mckeon cath. In addition, patient has an old RUE scar from prior self injurious behavior from last week. Patient states he had thoughts of hurting himself this AM; however currently does not have any SI/HI/hallucinations. Patient at this time appears to be calm although requests inpatient psychiatric admission for unclear reasons.

## 2024-10-09 NOTE — PROGRESS NOTE ADULT - ASSESSMENT
33 y/o M with PMHx DM2, HTN, HLD, autism, behavioral disorder requiring inpatient psychiatric admissions and constant 1:1 observation, BPH with urinary retention, hx testicular cancer s/p b/l orchiectomy who presents to the ED with urinary retention,. mckeon inserted    Urinary retention   BPH    - on flomax.     - outpt urologist is Dr Marco Paul.     - encouraged ambulation and po intake.     - ongoing issue. will keep mckeon today. plan for TOV in AM.     type 2 DM    - cont LDISS    Autism     - cont buspar, depakote, remeron, risperdal     hypothyroidism     - cont synthroid     DVT proph: continue ambulation   TOV in AM  cont ambulation and hydration. UA neg  patient is on constant 1:1 due to behavior/impulse.   
33 y/o M with PMHx DM2, HTN, HLD, autism, behavioral disorder requiring inpatient psychiatric admissions and constant 1:1 observation, BPH with urinary retention, hx testicular cancer s/p b/l orchiectomy who presents to the ED with urinary retention,. mckeon inserted    Urinary retention   BPH    - on flomax- increase dose to 0.8mg QHS    - outpt urologist is Dr Marco Paul.     - encouraged ambulation and po intake.     - ongoing issue. Passed TOV this AM    type 2 DM    - cont LDISS    Autism     - cont buspar, depakote, remeron, risperdal    - Psych consult for recent auditory hallucinations and self injurious behavior    hypothyroidism     - cont synthroid     DVT proph: continue ambulation   cont ambulation and hydration. UA neg  patient is on constant 1:1 due to behavior/impulse.
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
## 2024-10-09 NOTE — BH CONSULTATION LIAISON ASSESSMENT NOTE - NS ED BHA HOMICIDALITY PRESENT CURRENT IDEATION
Pt verbalizes understanding that a urine sample is requested for analysis. Urinal at bedside.   None known

## 2024-10-10 ENCOUNTER — TRANSCRIPTION ENCOUNTER (OUTPATIENT)
Age: 34
End: 2024-10-10

## 2024-10-10 VITALS
TEMPERATURE: 98 F | HEART RATE: 78 BPM | OXYGEN SATURATION: 92 % | RESPIRATION RATE: 18 BRPM | DIASTOLIC BLOOD PRESSURE: 71 MMHG | SYSTOLIC BLOOD PRESSURE: 107 MMHG

## 2024-10-10 LAB
ANION GAP SERPL CALC-SCNC: 10 MMOL/L — SIGNIFICANT CHANGE UP (ref 5–17)
BUN SERPL-MCNC: 18 MG/DL — SIGNIFICANT CHANGE UP (ref 7–23)
CALCIUM SERPL-MCNC: 8.9 MG/DL — SIGNIFICANT CHANGE UP (ref 8.5–10.1)
CHLORIDE SERPL-SCNC: 101 MMOL/L — SIGNIFICANT CHANGE UP (ref 96–108)
CO2 SERPL-SCNC: 27 MMOL/L — SIGNIFICANT CHANGE UP (ref 22–31)
CREAT SERPL-MCNC: 0.85 MG/DL — SIGNIFICANT CHANGE UP (ref 0.5–1.3)
EGFR: 116 ML/MIN/1.73M2 — SIGNIFICANT CHANGE UP
GLUCOSE SERPL-MCNC: 118 MG/DL — HIGH (ref 70–99)
HCT VFR BLD CALC: 40.3 % — SIGNIFICANT CHANGE UP (ref 39–50)
HGB BLD-MCNC: 12.9 G/DL — LOW (ref 13–17)
MCHC RBC-ENTMCNC: 24.4 PG — LOW (ref 27–34)
MCHC RBC-ENTMCNC: 32 GM/DL — SIGNIFICANT CHANGE UP (ref 32–36)
MCV RBC AUTO: 76.2 FL — LOW (ref 80–100)
NRBC # BLD: 0 /100 WBCS — SIGNIFICANT CHANGE UP (ref 0–0)
PLATELET # BLD AUTO: 243 K/UL — SIGNIFICANT CHANGE UP (ref 150–400)
POTASSIUM SERPL-MCNC: 4.3 MMOL/L — SIGNIFICANT CHANGE UP (ref 3.5–5.3)
POTASSIUM SERPL-SCNC: 4.3 MMOL/L — SIGNIFICANT CHANGE UP (ref 3.5–5.3)
RBC # BLD: 5.29 M/UL — SIGNIFICANT CHANGE UP (ref 4.2–5.8)
RBC # FLD: 14.7 % — HIGH (ref 10.3–14.5)
SODIUM SERPL-SCNC: 138 MMOL/L — SIGNIFICANT CHANGE UP (ref 135–145)
VALPROATE SERPL-MCNC: 118 UG/ML — HIGH (ref 50–100)
WBC # BLD: 7.51 K/UL — SIGNIFICANT CHANGE UP (ref 3.8–10.5)
WBC # FLD AUTO: 7.51 K/UL — SIGNIFICANT CHANGE UP (ref 3.8–10.5)

## 2024-10-10 PROCEDURE — 99231 SBSQ HOSP IP/OBS SF/LOW 25: CPT

## 2024-10-10 PROCEDURE — 80048 BASIC METABOLIC PNL TOTAL CA: CPT

## 2024-10-10 PROCEDURE — 80053 COMPREHEN METABOLIC PANEL: CPT

## 2024-10-10 PROCEDURE — 85025 COMPLETE CBC W/AUTO DIFF WBC: CPT

## 2024-10-10 PROCEDURE — 36415 COLL VENOUS BLD VENIPUNCTURE: CPT

## 2024-10-10 PROCEDURE — 99285 EMERGENCY DEPT VISIT HI MDM: CPT | Mod: 25

## 2024-10-10 PROCEDURE — 99239 HOSP IP/OBS DSCHRG MGMT >30: CPT

## 2024-10-10 PROCEDURE — 80164 ASSAY DIPROPYLACETIC ACD TOT: CPT

## 2024-10-10 PROCEDURE — 82962 GLUCOSE BLOOD TEST: CPT

## 2024-10-10 PROCEDURE — 85027 COMPLETE CBC AUTOMATED: CPT

## 2024-10-10 PROCEDURE — 81001 URINALYSIS AUTO W/SCOPE: CPT

## 2024-10-10 RX ORDER — TAMSULOSIN HCL 0.4 MG
2 CAPSULE ORAL
Qty: 60 | Refills: 0
Start: 2024-10-10 | End: 2024-11-08

## 2024-10-10 RX ADMIN — Medication 2: at 12:17

## 2024-10-10 NOTE — DISCHARGE NOTE NURSING/CASE MANAGEMENT/SOCIAL WORK - NSDCPEFALRISK_GEN_ALL_CORE
For information on Fall & Injury Prevention, visit: [email or web address] OR  [email or web address] OR  https://www.cdc.gov/steadi/patient.html

## 2024-10-10 NOTE — BH CONSULTATION LIAISON PROGRESS NOTE - NSBHFUPINTERVALHXFT_PSY_A_CORE
Patient seen, evaluated and chart reviewed. Patient has reportedly had his urinary retention resolved and he is now scheduled for discharge. Patient reports feeling better and in good mood. No evidence of depression, everett or psychosis.

## 2024-10-10 NOTE — BH CONSULTATION LIAISON PROGRESS NOTE - NSBHCHARTREVIEWVS_PSY_A_CORE FT
Patient has blisters on back of left ankle.  
Vital Signs Last 24 Hrs  T(C): 36.7 (10 Oct 2024 12:12), Max: 36.7 (10 Oct 2024 12:12)  T(F): 98 (10 Oct 2024 12:12), Max: 98 (10 Oct 2024 12:12)  HR: 78 (10 Oct 2024 12:12) (69 - 89)  BP: 107/71 (10 Oct 2024 12:12) (92/60 - 107/71)  BP(mean): --  RR: 18 (10 Oct 2024 12:12) (18 - 18)  SpO2: 92% (10 Oct 2024 12:12) (91% - 92%)    Parameters below as of 10 Oct 2024 12:12  Patient On (Oxygen Delivery Method): room air

## 2024-10-10 NOTE — SOCIAL WORK PROGRESS NOTE - NSSWPROGRESSNOTE_GEN_ALL_CORE
Patient to be discharged via staff back to group home. Copy of chart to follow . All in agreement.  Patient to be discharged via staff back to group home. Copy of chart to follow . All in agreement.  Spoke with LEONORA Soto patients mother and housing staff.

## 2024-10-10 NOTE — BH CONSULTATION LIAISON PROGRESS NOTE - NSBHCHARTREVIEWLAB_PSY_A_CORE FT
12.9   7.51  )-----------( 243      ( 10 Oct 2024 05:45 )             40.3   10-10    138  |  101  |  18  ----------------------------<  118[H]  4.3   |  27  |  0.85    Ca    8.9      10 Oct 2024 05:45    TPro  7.2  /  Alb  3.3  /  TBili  0.4  /  DBili  x   /  AST  31  /  ALT  49  /  AlkPhos  93  10-09

## 2024-10-10 NOTE — DISCHARGE NOTE NURSING/CASE MANAGEMENT/SOCIAL WORK - NSDCVIVACCINE_GEN_ALL_CORE_FT
Tdap; 20-Dec-2018 12:21; Lo Anaya (RN); [employer]; [id number] (Exp. Date: 02-Nov-2020); IntraMuscular; Deltoid Left.; 0.5 milliLiter(s); VIS (VIS Published: 09-May-2013, VIS Presented: 20-Dec-2018);   Tdap; 26-Mar-2019 18:59; Shavon Barrios (RN); [employer]; [id number] (Exp. Date: 26-Feb-2021); IntraMuscular; Deltoid Left.; 0.5 milliLiter(s); VIS (VIS Published: 09-May-2013, VIS Presented: 26-Mar-2019);   Tdap; 01-Dec-2021 09:35; Zamzam Coulter (RN); [employer]; [id number] (Exp. Date: 09-Sep-2023); IntraMuscular; Deltoid Left.; 0.5 milliLiter(s); VIS (VIS Published: 09-May-2013, VIS Presented: 01-Dec-2021);   Tdap; 21-Jul-2022 01:28; Rose Hancock (RN); [employer]; [id number] (Exp. Date: 14-Oct-2024); IntraMuscular; Deltoid Right.; 0.5 milliLiter(s); VIS (VIS Published: 09-May-2013, VIS Presented: 21-Jul-2022);   Tdap; 12-Jun-2023 21:01; Diann Paul (RN); [employer]; [id number] (Exp. Date: 08-Mar-2025); IntraMuscular; Deltoid Left.; 0.5 milliLiter(s); VIS (VIS Published: 09-May-2013, VIS Presented: 12-Jun-2023);   Tdap; 24-Jul-2023 23:01; Diann Paul (RN); [employer]; [id number] (Exp. Date: 18-Aug-2025); IntraMuscular; Deltoid Right.; 0.5 milliLiter(s); VIS (VIS Published: 09-May-2013, VIS Presented: 24-Jul-2023);   Tdap; 09-Jun-2024 22:20; Dyllan Arevalo (RN); [employer]; [id number] (Exp. Date: 21-Nov-2024); IntraMuscular; Deltoid Right.; 0.5 milliLiter(s); VIS (VIS Published: 09-May-2013, VIS Presented: 09-Jun-2024);   Tdap; 24-Sep-2024 18:28; Luba Crocker (RN); [employer]; [id number] (Exp. Date: 31-May-2026); IntraMuscular; Deltoid Left.; 0.5 milliLiter(s); VIS (VIS Published: 09-May-2013, VIS Presented: 24-Sep-2024);

## 2024-10-10 NOTE — DISCHARGE NOTE PROVIDER - NSDCFUSCHEDAPPT_GEN_ALL_CORE_FT
Santiago Das  [facility]  [facility]  Scheduled Appointment: 10/11/2024    [facility]  ORTHOSURG [address]  Scheduled Appointment: 10/11/2024    [facility]  GASTRO [address]  Scheduled Appointment: 10/18/2024    Mary Jane Peacock  [facility]  OTOLARYNG  [facility]  Scheduled Appointment: 10/21/2024

## 2024-10-10 NOTE — DISCHARGE NOTE PROVIDER - NSDCMRMEDTOKEN_GEN_ALL_CORE_FT
atorvastatin 10 mg oral tablet: 1 tab(s) orally once a day (in the evening)  busPIRone 15 mg oral tablet: 1 tab(s) orally once a day (in the evening)  cloNIDine 0.3 mg oral tablet: 1 tab(s) orally every 12 hours  divalproex sodium 500 mg oral tablet, extended release: 2 tab(s) orally once a day (at bedtime)  divalproex sodium 500 mg oral tablet, extended release: 1 tab(s) orally once a day (in the afternoon) at 2pm  levothyroxine 50 mcg (0.05 mg) oral tablet: 1 tab(s) orally once a day  metFORMIN 1000 mg oral tablet: 1 tab(s) orally 2 times a day (with meals)  mirtazapine 15 mg oral tablet: 1.5 tab(s) orally once a day (at bedtime)  Protonix 20 mg oral delayed release tablet: 1 tab(s) orally once a day  risperiDONE 4 mg oral tablet: 1 tab(s) orally 2 times a day at 2pm &amp; 8pm  senna (sennosides) 8.6 mg oral tablet: 2 tab(s) orally once a day (at bedtime)  tamsulosin 0.4 mg oral capsule: 2 cap(s) orally once a day (at bedtime)  testosterone 50 mg/5 g (1%) transdermal gel: 1 packet(s) transdermally once a day

## 2024-10-10 NOTE — DISCHARGE NOTE PROVIDER - CARE PROVIDER_API CALL
Santiago Das  Medical Oncology  [address] [place] [id number]  Phone: [phone]  Fax: [phone]  Follow Up Time:     Rafy Omalley  Psychiatry  [address] [place] [id number]  Phone: [phone]  Fax: [phone]  Follow Up Time:

## 2024-10-10 NOTE — DISCHARGE NOTE PROVIDER - NSDCDCMDCOMP_GEN_ALL_CORE
Wear the FreeStyle Oj and see if you like it and we can send a prescription.    You can try a nasal spray Flonase in the skin site for the pod to decrease the skin reaction.    Basal Rate  12A: 0.9 U/h  8A:   0.85 U/h  5P:   1.1 U/h    Carb Ratio  12A:  1:10  5A:    1:8  8P:    1:10    ISF  12A: 1:50    Target: 115/115    IAT: 3.5h   This document is complete and the patient is ready for discharge.

## 2024-10-10 NOTE — DISCHARGE NOTE PROVIDER - NSDCCPCAREPLAN_GEN_ALL_CORE_FT
PRINCIPAL DISCHARGE DIAGNOSIS  Diagnosis: Urinary retention  Assessment and Plan of Treatment: Resolved. Avoid antihistamine medications as it could increase risk for urinary retention. Increase flomax from 0.4mg at bedtime to 0.8mg at bedtime. FOllow up with Urology within 2 weeks of discharge.      SECONDARY DISCHARGE DIAGNOSES  Diagnosis: Bipolar disorder  Assessment and Plan of Treatment: Continue medications as prescribed. Follow up with Psychiatry within 1 week of discharge.

## 2024-10-10 NOTE — BH CONSULTATION LIAISON PROGRESS NOTE - CURRENT MEDICATION
MEDICATIONS  (STANDING):  atorvastatin 10 milliGRAM(s) Oral at bedtime  busPIRone 15 milliGRAM(s) Oral <User Schedule>  cloNIDine 0.3 milliGRAM(s) Oral every 12 hours  dextrose 5%. 1000 milliLiter(s) (50 mL/Hr) IV Continuous <Continuous>  dextrose 5%. 1000 milliLiter(s) (100 mL/Hr) IV Continuous <Continuous>  dextrose 50% Injectable 25 Gram(s) IV Push once  dextrose 50% Injectable 25 Gram(s) IV Push once  dextrose 50% Injectable 12.5 Gram(s) IV Push once  divalproex  milliGRAM(s) Oral <User Schedule>  divalproex ER 1000 milliGRAM(s) Oral <User Schedule>  enoxaparin Injectable 40 milliGRAM(s) SubCutaneous every 12 hours  glucagon  Injectable 1 milliGRAM(s) IntraMuscular once  influenza   Vaccine 0.5 milliLiter(s) IntraMuscular once  insulin lispro (ADMELOG) corrective regimen sliding scale   SubCutaneous at bedtime  insulin lispro (ADMELOG) corrective regimen sliding scale   SubCutaneous three times a day before meals  levothyroxine 50 MICROGram(s) Oral daily  mirtazapine 22.5 milliGRAM(s) Oral at bedtime  pantoprazole    Tablet 40 milliGRAM(s) Oral before breakfast  risperiDONE   Tablet 4 milliGRAM(s) Oral <User Schedule>  senna 2 Tablet(s) Oral at bedtime  tamsulosin 0.8 milliGRAM(s) Oral at bedtime  testosterone 1% Gel 50 milliGRAM(s) Topical daily    MEDICATIONS  (PRN):  acetaminophen     Tablet .. 650 milliGRAM(s) Oral every 6 hours PRN Temp greater or equal to 38C (100.4F), Mild Pain (1 - 3)  dextrose Oral Gel 15 Gram(s) Oral once PRN Blood Glucose LESS THAN 70 milliGRAM(s)/deciliter

## 2024-10-10 NOTE — DISCHARGE NOTE PROVIDER - HOSPITAL COURSE
ADMISSION DATE:  10-07-24    ---  FROM ADMISSION H+P:   HPI:  34M PMH of Bipolar Disorder, BPH, ADHD, MR, Hypothyroidism, Asthma, DM2, HTN, HLD, autism, behavioral disorder requiring previous inpatient psychiatric admissions and constant 1:1 observation, BPH with urinary retention, hx testicular cancer s/p b/l orchiectomy BIBEMS from [facility] resides at [facility] in [place] comes in  for urinary retention; last urination was this AM with dysuria. States he normally has these symptoms requiring a catheter. ED provider spoke with adult home supervisor KAYLIE ([phone]), and she states the group home does not have the resources to take care and manage the mckeon cath. In addition, patient has an old RUE scar from prior self injurious behavior from last week. Patient states he had thoughts of hurting himself this AM; however currently does not have any SI/HI/hallucinations.    In ED:    Vitals T 98.1  HR 70  /64  RR 17 O2S96%    Meds/fluids administered-  1L LR bolus,    Significant Labs:  Glucose of 179    Imaging Done:   Bladder Scan: >400 cc of retained urine,    (08 Oct 2024 02:06)      ---  HOSPITAL COURSE/PERTINENT LABS/PROCEDURES PERFORMED/PENDING TESTS:  Patient admitted to medicine for urinary retention. Patient was on 1:1 observation during hospital stay for history of self injurious behavior. Patient had mckeon catheter placed. Patient given TOV and passed. Psych evaluated patient and cleared for discharge. Patient eager to go home.    Patient seen and examined at bedside. States he is feeling well, urinating without any complaints. Patient denies any auditory/visual hallucinations. Denies any homicidal/suicidal ideations. Patient clinically improved through hospital stay and medically stable for discharge.    ---  PATIENT CONDITION:  - stable    ---  PHYSICAL EXAM ON DAY OF DISCHARGE:  General: laying comfortably in bed, NAD  Neurology: A&Ox3, nonfocal, SALAS x 4  Respiratory: CTA B/L, no w/r/r  CV: RRR, S1S2, no murmurs, rubs or gallops  Abdominal: Soft, NT, ND +BS  Extremities: +healing laceration RUE- no erythema/swelling, + peripheral pulses    ---  CONSULTANTS:   Dr. Omalley (Psych)    ---  TIME SPENT:  I, the attending physician, was physically present for the key portions of the evaluation and management (E/M) service provided. The total amount of time spent reviewing the hospital notes, laboratory values, imaging findings, assessing/counseling the patient, discussing with consultant physicians, social work, nursing staff was 39 minutes

## 2024-10-10 NOTE — DISCHARGE NOTE NURSING/CASE MANAGEMENT/SOCIAL WORK - PATIENT PORTAL LINK FT
You can access the FollowMyHealth Patient Portal offered by [facility] by registering at the following website: [email or web address]. By joining [employer]’s FollowMyHealth portal, you will also be able to view your health information using other applications (apps) compatible with our system.

## 2024-10-11 ENCOUNTER — APPOINTMENT (OUTPATIENT)
Dept: ORTHOPEDIC SURGERY | Facility: CLINIC | Age: 34
End: 2024-10-11
Payer: MEDICAID

## 2024-10-11 DIAGNOSIS — S63.501A UNSPECIFIED SPRAIN OF RIGHT WRIST, INITIAL ENCOUNTER: ICD-10-CM

## 2024-10-11 PROCEDURE — 99213 OFFICE O/P EST LOW 20 MIN: CPT

## 2024-10-15 VITALS — HEIGHT: 70 IN | BODY MASS INDEX: 43.09 KG/M2 | WEIGHT: 301 LBS

## 2024-10-15 NOTE — ED ADULT NURSE NOTE - NSFALLUNIVINTERV_ED_ALL_ED
Assessment & Plan      Orders given again for mammogram with the following codes     1.-[id number]   2. [id number]   Bed/Stretcher in lowest position, wheels locked, appropriate side rails in place/Call bell, personal items and telephone in reach/Instruct patient to call for assistance before getting out of bed/chair/stretcher/Non-slip footwear applied when patient is off stretcher/[place] to call system/Physically safe environment - no spills, clutter or unnecessary equipment/Purposeful proactive rounding/Room/bathroom lighting operational, light cord in reach

## 2024-10-15 NOTE — ED PROVIDER NOTE - NS ED MD EM SELECTION
Suspect a component of patient's discomfort is related to scar tissue from previous  sites that interacts with pressure from distended bladder prior to micturition and causes component of neuropathic pain. This may explain why Mirabegron has been helping her. She has urgency and frequency without loss of urine and without sxs consistent with stress incontinence.    Orders:    Ambulatory Referral to Urogynecology; Future    Mirabegron ER 50 MG TB24; Take 1 tablet (50 mg total) by mouth in the morning     [id number] Detailed

## 2024-10-17 NOTE — ED ADULT NURSE NOTE - HISTORY OF COVID-19 VACCINATION
Dr. Oviedo at bedside seeing pt, states pt needs shelter placement and prescription delivery to bedside for discharge today.   No

## 2024-10-21 ENCOUNTER — OUTPATIENT (OUTPATIENT)
Dept: OUTPATIENT SERVICES | Facility: HOSPITAL | Age: 34
LOS: 1 days | Discharge: HOME | End: 2024-10-21

## 2024-10-21 ENCOUNTER — APPOINTMENT (OUTPATIENT)
Dept: OTOLARYNGOLOGY | Facility: CLINIC | Age: 34
End: 2024-10-21
Payer: MEDICAID

## 2024-10-21 DIAGNOSIS — H93.293 OTHER ABNORMAL AUDITORY PERCEPTIONS, BILATERAL: ICD-10-CM

## 2024-10-21 DIAGNOSIS — Z90.79 ACQUIRED ABSENCE OF OTHER GENITAL ORGAN(S): Chronic | ICD-10-CM

## 2024-10-21 DIAGNOSIS — H61.23 IMPACTED CERUMEN, BILATERAL: ICD-10-CM

## 2024-10-21 DIAGNOSIS — H90.3 SENSORINEURAL HEARING LOSS, BILATERAL: ICD-10-CM

## 2024-10-21 DIAGNOSIS — R06.83 SNORING: ICD-10-CM

## 2024-10-21 PROCEDURE — 92557 COMPREHENSIVE HEARING TEST: CPT

## 2024-10-21 PROCEDURE — 92567 TYMPANOMETRY: CPT

## 2024-10-21 PROCEDURE — 99204 OFFICE O/P NEW MOD 45 MIN: CPT | Mod: 25

## 2024-10-21 PROCEDURE — G0268 REMOVAL OF IMPACTED WAX MD: CPT

## 2024-10-21 RX ORDER — FLUPHENAZINE DECANOATE 25 MG/ML
INJECTION, SOLUTION INTRAMUSCULAR; SUBCUTANEOUS
Refills: 0 | Status: ACTIVE | COMMUNITY

## 2024-10-22 NOTE — ED BEHAVIORAL HEALTH ASSESSMENT NOTE - CURRENT PLAN:
Diagnosis:   Acute right-sided back pain, unspecified back location (M54.9)        Referring Provider: Stephen Modi  Date of Evaluation:    7/17/2024    Precautions:   ASHD Next MD visit:   none scheduled  Date of Surgery: n/a     Insurance Primary/Secondary: MEDICARE / [employer]BS [place] INDEMNITY     # Auth Visits: 12 per POC  (12/24/2024)          Subjective: Pt reports no pain today. He reports increased participation in ambulation without back pain.    Pain: 0/10 R lumbar       Objective:   Rx. See flow chart:  --------------------------  LE   Hip flexion (L2): R 4/5; L 4/5  Hip abduction: R 3-/5; L 3-/5  Hip Extension: R 4/*5; L 4/5            Hip ER: R 4+/5; L 5/5  Hip IR: R 4+/5; L 5/5  Knee Flexion: R 5/5; L 5/5            Knee extension (L3): R 4/5; L 5/5            DF (L4): R 5/5; L 5/5  PF (S1): R 5/5; L 5/5   --------------------  9/25/2024:  Lumbar AROM: (* denotes performed with pain)  Flexion: 45  Extension: 10  Sidebending: R 5 L 8  Rotation: R 80% wnl; L 80% wnl    Severe hip flexor restrictions bilaterally    Observation/Posture:   Kyphotic, forward head, rounded shoulders, hip hinge    Accessory Motion:   Thoracic hypomobility  -------------------------------------  8/8/2024:  Lumbar AROM: (* denotes performed with pain)  Flexion: 50  Extension: 10  Sidebending: R 15; L 10  Rotation: R 80% wnl; L 80% wnl     -----------------------------  At IE:   Observation/Posture:   Kyphotic, forward head, rounded shoulders  Sits with a posterior pelvic tilt     Neuro Screen: Light touch intact. Denies BLE N/T     Lumbar AROM: (* denotes performed with pain)  Flexion: 50  Extension: 15  Sidebending: R 15; L 10  Rotation: R 60% wnl; L 60% wnl     Accessory motion:   Thoracic hypomobility PA  Upper Lumbar hypomobility PA  Lower Lumbar - WNL - PA     Palpation:   Denies TTP B: PS, QL, Lat, RTC,      Strength: (* denotes performed with pain)  UE/Scapular LE   Shoulder Flex: R 4+/5, L 5/5  Shoulder ABD (C5): R 5/5, L  5/5  Shoulder ER: R 5/5, L 4+/5  Shoulder IR: R 5/5, L 5/5     Rhomboids: R 4/5, L 4/5  Mid trap: R 4/5; L 4/5  Lats: R 4/5* pain across back, L 4/5  Low trap: R 3/5; L 3/5    Hip flexion (L2): R 5/5; L 5/5  Hip abduction: R 4/5; L 4/5  Hip Extension: R 4/5; L 4/5            Hip ER: R 5/5; L 5/5  Hip IR: R 5/5; L 5/5  Knee Flexion: R 5/5; L 5/5            Knee extension (L3): R 5/5; L 5/5            DF (L4): R 5/5; L 5/5  PF (S1): R 5/5; L 5/5      Flexibility:   LE   Hip Flexor: R Severe restrictions, L Severe restrictions  Hamstrings: R Severe restrictions; L Severe restrictions  Piriformis: R WNL; L WNL  Quads: R Min restrictions; L Min restrictions  Gastroc-soleus: R Min restrictions; L Min restrictions         Gait: pt ambulates on level ground with antalgia, stooped posture/forward lean, trendelenburg/waddle, and slowed charlie .  Balance: SLS R 30 sec, L 30 sec    Assessment: Discussion of POC today as pt has two more scheduled visits, but 1 more in POC. Pt reports he would like to continue with skilled PT services for 1-3 more visits, to address ROM and strength deficits to improve participation in ADL's. Will complete PN next session.       Goals:    (to be met in 12 visits)   Pt will improve transversus abdominis recruitment to perform proper isometric contraction without requiring verbal or tactile cuing to promote advancement of therex MET  Pt will demonstrate good understanding of proper posture and body mechanics to decrease pain and improve spinal safety MET  Pt will improve lumbar spine AROM flexion to >55 deg to allow increase ease with bending forward to don shoes Ongoing  Pt will have decreased paraspinal mm tension to tolerate standing >45 minutes for work and home activities   Pt will demonstrate improved core strength to be able to perform sitting with <2/10 pain MET  Pt will be independent and compliant with comprehensive HEP to maintain progress achieved in PT MET for current program  Pt will  improve hip ABD and ER strength to 4+/5 to increase ease with standing and walking progressing (pt reports pain-free with standing and walking)     Plan: Patient will be seen for 1-2 x/week or a total of 12 visits over a 90 day period. Treatment will include: Gait training, Manual Therapy, Neuromuscular Re-education, Therapeutic Activities, Therapeutic Exercise, and Home Exercise Program instruction     Certification From: 9/25/2024  To:12/24/2024     Date: 7/19/2024  TX#: 2/10 Date:7/22/2024                 TX#: 3/10 Date: 7/25/2024           TX#: 4/10 Date:8/1/2024                 TX#: 5/10 Date: 8/8/2024  Tx#: 6/10 Date: 9/25/2024  Tx#: 7/12  PN Date: 10/2/2024  Tx#: 8/12 Date: 10/7/2024  Tx#: 9/12 Date: 10/15/2024  Tx#: 10/12 Date: 10/22/2024  Tx#: 11/12   TE: 30'   Nu-Step lvl 1 - 5 min  SKTC 5 sec x 10 B  SB roll outs fwd & lat 5 sec x 10 each  HEP review TE: 33'   Nu-Step lvl 2 - 5 min  SB roll outs fwd & lat 5 sec x 10 each  Prone Press up to tolerance x12   Seated thoracic extension, x15   Bridging, 2x10  SB DKTC, 2x10  Standing rows, GTB B 2x10 TE: 20'   SB roll outs fwd & lat 5 sec x 10 each  Prone Press up (unable) - performed prone on elbows to tolerance x 10   Seated thoracic extension arms behind head - chest open 2 x10, (reports of \"feels good\")  SB DKTC, 2x10 (relieves pain in upper back) TE: 10'   Prone on elbows x 10   DKTC w/ towel assist 5 sec x 10   Seated thoracic extension/lumbar with arms behind head x 15   TE: 10'   Bridge with abd iso x 15   Clamshells x 15 B TE: 30'  Reassessment  Hip flexor stretch w/ PT over pressure 30 sec x 2 B  Clamshell x15 B TE: 12'  Hip flexor stretch w/ PT over pressure 30 sec x 3 B  SBDKTC x 10   Slider hip abd x 10 B   TE: 12'  Hip flexor stretch w/ PT over pressure 30 sec x 3 B  SBDKTC x 10   Slider hip abd x 10 B TE: 10'  Hip flexor stretch w/ PT over pressure 30 sec x 3 B  Hamstring stretch 15 sec x 3 B TE: 15'  Hip flexor stretch w/ PT over pressure 30 sec  x 3 B  Hamstring stretch 15 sec x 3 B  POC discussion   MT: 15'   STM - PS, Lat, QL R   Thoracic CPA grade II  Upper lower CPA grade II MT: 12'  STM - PS, Lat, QL R   Thoracic CPA grade II  Upper lower CPA grade II MT: 15'  STM - PS, Lat, QL R   Thoracic CPA grade II  Upper lumbar CPA grade II MT: 15'  STM - PS, Lat, QL B   Thoracic CPA grade II  Upper lumbar CPA grade II MT: 15'  STM - PS, Lat, QL B   Thoracic CPA grade II  Upper lumbar CPA grade II MT: 10'   R PS - STM  Thoracic CPA grade II   MT: 15'   R PS - STM  Thoracic CPA grade II MT: 15'   R PS - STM  Thoracic CPA grade II-III MT: 15'   R PS - STM  Thoracic CPA grade II-III MT: 15'   R PS - STM  Thoracic CPA grade II-III   NRE: 10'  PPT 5 sec x 12   TA recruit w/ abd iso w/ belt 3 sec x 10  NRE: 10'  Rows GTB with TA recruitment x 20 (cueing to look up)  TA recruitment with bridges x 15 NRE: 20'  Rows GTB with TA recruitment x 20 (cueing to look up)  TA recruitment with bridges x 15  Extensions cueing to look up and keep core engaged YTB x 15  Pallof press YTB x 5 B (cueing to keep head up) NRE: 15'  TA recruit with supine march x 10   Extensions RTB x 20 (cueing needed to keep head up & limit downward gaze)  Pallof press YTB x 5 B    NRE: 15'  Prone shoulder extension x 15 B  Prone ball squeezes 3 sec x 10  Extensions RTB TA act x 15  Rows RTB TA act x 15  Hip abd iso 5 sec x 10    NRE: 18'  Prone shoulder extension x 15 B  Prone ball squeezes 3 sec x 10  Extensions RTB TA act x 15  Rows RTB TA act x 15  Hip abd iso 5 sec x 10   Prone flys x 15 NRE: 18'  Prone shoulder extension x 10 B #1  Prone ball squeezes 5 sec x 8  Extensions GTB TA act x 10  Rows GTB TA act x 10  Prone flys x 10 #1 NRE: 15'  Prone shoulder extension x 10 B #1  Prone ball squeezes 10 sec x 5  Prone flys x 10 #1  Supine HABD RTB x 10   Supine JAZZY RTB x 10               HEP: Access Code: [email or web address]  URL: [email or web address]  Prepared by: Oleg Cueva  Standing Quadratus Lumborum Stretch with Doorway  - 1 x daily - 7 x weekly - 1 sets - 10 reps - 15 sec hold  - Standing Hip Flexor Stretch  - 1 x daily - 7 x weekly - 5 reps - 20 - 30 sec hold  - Supine Lower Trunk Rotation  - 1 x daily - 7 x weekly - 2 sets - 10 reps - 5 sec hold  - Sidelying Thoracic Rotation with Open Book  - 1 x daily - 7 x weekly - 1-2 sets - 10 reps - 5 sec hold  - Seated Thoracic Extension with Hands Behind Neck  - 1 x daily - 7 x weekly - 2 sets - 10 reps  - Standing Row with Anchored Resistance with TA activation- 1 x daily - 7 x weekly - 2 sets - 10 reps  ------------------------------------------------------------------------  - Clamshell  - 1 x daily - 7 x weekly - 2 sets - 10 reps  - Hip Flexor Stretch at Edge of Bed  - 1 x daily - 7 x weekly - 3-5 reps - 20-30sec hold    Charges: 1 TE; 1 MT 1 NRE  Total Timed Treatment: 45 min  Total Treatment Time: 45 min   None known

## 2024-10-24 NOTE — H&P ADULT - NSHPPOADEEPVENOUSTHROMB_GEN_A_CORE
Patient was offered choice of vendor and chose [facility].  Patient Romelia Tsang was set up at [place]  AIDEN Abbott    no

## 2024-10-25 ENCOUNTER — APPOINTMENT (OUTPATIENT)
Dept: GASTROENTEROLOGY | Facility: CLINIC | Age: 34
End: 2024-10-25
Payer: MEDICAID

## 2024-10-25 DIAGNOSIS — K58.2 MIXED IRRITABLE BOWEL SYNDROME: ICD-10-CM

## 2024-10-25 DIAGNOSIS — K76.0 FATTY (CHANGE OF) LIVER, NOT ELSEWHERE CLASSIFIED: ICD-10-CM

## 2024-10-25 LAB
ALBUMIN SERPL ELPH-MCNC: 4.5 G/DL
ALP BLD-CCNC: 92 U/L
ALT SERPL-CCNC: 41 U/L
ANION GAP SERPL CALC-SCNC: 16 MMOL/L
AST SERPL-CCNC: 22 U/L
BASOPHILS # BLD AUTO: 0.05 K/UL
BASOPHILS NFR BLD AUTO: 0.6 %
BILIRUB SERPL-MCNC: 0.2 MG/DL
BUN SERPL-MCNC: 13 MG/DL
CALCIUM SERPL-MCNC: 9.6 MG/DL
CERULOPLASMIN SERPL-MCNC: 23 MG/DL
CHLORIDE SERPL-SCNC: 100 MMOL/L
CO2 SERPL-SCNC: 23 MMOL/L
CREAT SERPL-MCNC: 0.87 MG/DL
EGFR: 115 ML/MIN/1.73M2
EOSINOPHIL # BLD AUTO: 0.21 K/UL
EOSINOPHIL NFR BLD AUTO: 2.4 %
FERRITIN SERPL-MCNC: 28 NG/ML
GLUCOSE SERPL-MCNC: 123 MG/DL
HCT VFR BLD CALC: 45.1 %
HGB BLD-MCNC: 13.6 G/DL
IMM GRANULOCYTES NFR BLD AUTO: 0.3 %
INR PPP: 0.91 RATIO
IRON SATN MFR SERPL: 14 %
IRON SERPL-MCNC: 53 UG/DL
LYMPHOCYTES # BLD AUTO: 3.19 K/UL
LYMPHOCYTES NFR BLD AUTO: 36.4 %
MAN DIFF?: NORMAL
MCHC RBC-ENTMCNC: 23.8 PG
MCHC RBC-ENTMCNC: 30.2 GM/DL
MCV RBC AUTO: 78.8 FL
MONOCYTES # BLD AUTO: 0.54 K/UL
MONOCYTES NFR BLD AUTO: 6.2 %
NEUTROPHILS # BLD AUTO: 4.74 K/UL
NEUTROPHILS NFR BLD AUTO: 54.1 %
PLATELET # BLD AUTO: 262 K/UL
POTASSIUM SERPL-SCNC: 5 MMOL/L
PROT SERPL-MCNC: 6.8 G/DL
PT BLD: 10.8 SEC
RBC # BLD: 5.72 M/UL
RBC # FLD: 15.7 %
SODIUM SERPL-SCNC: 138 MMOL/L
TIBC SERPL-MCNC: 391 UG/DL
UIBC SERPL-MCNC: 338 UG/DL
WBC # FLD AUTO: 8.76 K/UL

## 2024-10-25 PROCEDURE — 99203 OFFICE O/P NEW LOW 30 MIN: CPT

## 2024-10-26 LAB
ANA SER QL IA: NEGATIVE
CENTROMERE IGG SER-ACNC: <0.2 AL
CHROMATIN AB SERPL-ACNC: <0.2 AL
DSDNA AB SER-ACNC: <1 IU/ML
ENA JO1 AB SER IA-ACNC: <0.2 AL
ENA RNP AB SER IA-ACNC: <0.2 AL
ENA SCL70 IGG SER IA-ACNC: <0.2 AL
ENA SM AB SER IA-ACNC: <0.2 AL
ENA SS-A AB SER IA-ACNC: <0.2 AL
ENA SS-B AB SER IA-ACNC: <0.2 AL
HAV IGM SER QL: NONREACTIVE
HBV CORE IGM SER QL: NONREACTIVE
HBV SURFACE AG SER QL: NONREACTIVE
HCV AB SER QL: NONREACTIVE
HCV S/CO RATIO: 0.09 S/CO
RIBOSOMAL P AB SER IA-ACNC: <0.2 AL

## 2024-10-27 LAB — SMOOTH MUSCLE AB SER QL IF: ABNORMAL

## 2024-10-28 PROBLEM — K58.2 IRRITABLE BOWEL SYNDROME WITH ALTERNATING BOWEL HABITS: Status: ACTIVE | Noted: 2024-10-28

## 2024-10-28 LAB — MITOCHONDRIAL (M2) AB, SERUM: <20 UNITS

## 2024-11-02 ENCOUNTER — NON-APPOINTMENT (OUTPATIENT)
Age: 34
End: 2024-11-02

## 2024-11-02 ENCOUNTER — EMERGENCY (EMERGENCY)
Facility: HOSPITAL | Age: 34
LOS: 1 days | Discharge: HOME | End: 2024-11-02
Attending: EMERGENCY MEDICINE | Admitting: EMERGENCY MEDICINE
Payer: MEDICAID

## 2024-11-02 VITALS
OXYGEN SATURATION: 98 % | RESPIRATION RATE: 18 BRPM | SYSTOLIC BLOOD PRESSURE: 138 MMHG | DIASTOLIC BLOOD PRESSURE: 88 MMHG | HEART RATE: 84 BPM

## 2024-11-02 VITALS
RESPIRATION RATE: 18 BRPM | WEIGHT: 294.1 LBS | TEMPERATURE: 97 F | DIASTOLIC BLOOD PRESSURE: 94 MMHG | OXYGEN SATURATION: 98 % | HEIGHT: 69 IN | SYSTOLIC BLOOD PRESSURE: 157 MMHG | HEART RATE: 93 BPM

## 2024-11-02 DIAGNOSIS — Z90.79 ACQUIRED ABSENCE OF OTHER GENITAL ORGAN(S): Chronic | ICD-10-CM

## 2024-11-02 LAB
ALBUMIN SERPL ELPH-MCNC: 3.2 G/DL — LOW (ref 3.3–5)
ALP SERPL-CCNC: 78 U/L — SIGNIFICANT CHANGE UP (ref 40–120)
ALT FLD-CCNC: 47 U/L — SIGNIFICANT CHANGE UP (ref 12–78)
ANION GAP SERPL CALC-SCNC: 6 MMOL/L — SIGNIFICANT CHANGE UP (ref 5–17)
APTT BLD: 32.2 SEC — SIGNIFICANT CHANGE UP (ref 24.5–35.6)
AST SERPL-CCNC: 27 U/L — SIGNIFICANT CHANGE UP (ref 15–37)
BASOPHILS # BLD AUTO: 0.04 K/UL — SIGNIFICANT CHANGE UP (ref 0–0.2)
BASOPHILS NFR BLD AUTO: 0.5 % — SIGNIFICANT CHANGE UP (ref 0–2)
BILIRUB SERPL-MCNC: 0.2 MG/DL — SIGNIFICANT CHANGE UP (ref 0.2–1.2)
BUN SERPL-MCNC: 14 MG/DL — SIGNIFICANT CHANGE UP (ref 7–23)
CALCIUM SERPL-MCNC: 8.9 MG/DL — SIGNIFICANT CHANGE UP (ref 8.5–10.1)
CHLORIDE SERPL-SCNC: 105 MMOL/L — SIGNIFICANT CHANGE UP (ref 96–108)
CO2 SERPL-SCNC: 26 MMOL/L — SIGNIFICANT CHANGE UP (ref 22–31)
CREAT SERPL-MCNC: 0.86 MG/DL — SIGNIFICANT CHANGE UP (ref 0.5–1.3)
D DIMER BLD IA.RAPID-MCNC: <150 NG/ML DDU — SIGNIFICANT CHANGE UP
EGFR: 116 ML/MIN/1.73M2 — SIGNIFICANT CHANGE UP
EOSINOPHIL # BLD AUTO: 0.39 K/UL — SIGNIFICANT CHANGE UP (ref 0–0.5)
EOSINOPHIL NFR BLD AUTO: 5.2 % — SIGNIFICANT CHANGE UP (ref 0–6)
GLUCOSE SERPL-MCNC: 160 MG/DL — HIGH (ref 70–99)
HCT VFR BLD CALC: 43 % — SIGNIFICANT CHANGE UP (ref 39–50)
HGB BLD-MCNC: 13.6 G/DL — SIGNIFICANT CHANGE UP (ref 13–17)
IMM GRANULOCYTES NFR BLD AUTO: 0.9 % — SIGNIFICANT CHANGE UP (ref 0–0.9)
INR BLD: 0.94 RATIO — SIGNIFICANT CHANGE UP (ref 0.85–1.16)
LYMPHOCYTES # BLD AUTO: 2.64 K/UL — SIGNIFICANT CHANGE UP (ref 1–3.3)
LYMPHOCYTES # BLD AUTO: 35.3 % — SIGNIFICANT CHANGE UP (ref 13–44)
MCHC RBC-ENTMCNC: 24.2 PG — LOW (ref 27–34)
MCHC RBC-ENTMCNC: 31.6 G/DL — LOW (ref 32–36)
MCV RBC AUTO: 76.5 FL — LOW (ref 80–100)
MONOCYTES # BLD AUTO: 0.49 K/UL — SIGNIFICANT CHANGE UP (ref 0–0.9)
MONOCYTES NFR BLD AUTO: 6.6 % — SIGNIFICANT CHANGE UP (ref 2–14)
NEUTROPHILS # BLD AUTO: 3.84 K/UL — SIGNIFICANT CHANGE UP (ref 1.8–7.4)
NEUTROPHILS NFR BLD AUTO: 51.5 % — SIGNIFICANT CHANGE UP (ref 43–77)
NRBC # BLD: 0 /100 WBCS — SIGNIFICANT CHANGE UP (ref 0–0)
PLATELET # BLD AUTO: 217 K/UL — SIGNIFICANT CHANGE UP (ref 150–400)
POTASSIUM SERPL-MCNC: 3.9 MMOL/L — SIGNIFICANT CHANGE UP (ref 3.5–5.3)
POTASSIUM SERPL-SCNC: 3.9 MMOL/L — SIGNIFICANT CHANGE UP (ref 3.5–5.3)
PROT SERPL-MCNC: 6.4 G/DL — SIGNIFICANT CHANGE UP (ref 6–8.3)
PROTHROM AB SERPL-ACNC: 11.1 SEC — SIGNIFICANT CHANGE UP (ref 9.9–13.4)
RBC # BLD: 5.62 M/UL — SIGNIFICANT CHANGE UP (ref 4.2–5.8)
RBC # FLD: 15.1 % — HIGH (ref 10.3–14.5)
SODIUM SERPL-SCNC: 137 MMOL/L — SIGNIFICANT CHANGE UP (ref 135–145)
TROPONIN I, HIGH SENSITIVITY RESULT: 9.2 NG/L — SIGNIFICANT CHANGE UP
WBC # BLD: 7.47 K/UL — SIGNIFICANT CHANGE UP (ref 3.8–10.5)
WBC # FLD AUTO: 7.47 K/UL — SIGNIFICANT CHANGE UP (ref 3.8–10.5)

## 2024-11-02 PROCEDURE — 93005 ELECTROCARDIOGRAM TRACING: CPT

## 2024-11-02 PROCEDURE — 85730 THROMBOPLASTIN TIME PARTIAL: CPT

## 2024-11-02 PROCEDURE — 71045 X-RAY EXAM CHEST 1 VIEW: CPT | Mod: 26

## 2024-11-02 PROCEDURE — 85610 PROTHROMBIN TIME: CPT

## 2024-11-02 PROCEDURE — 99285 EMERGENCY DEPT VISIT HI MDM: CPT

## 2024-11-02 PROCEDURE — 84484 ASSAY OF TROPONIN QUANT: CPT

## 2024-11-02 PROCEDURE — 36415 COLL VENOUS BLD VENIPUNCTURE: CPT

## 2024-11-02 PROCEDURE — 85025 COMPLETE CBC W/AUTO DIFF WBC: CPT

## 2024-11-02 PROCEDURE — 71045 X-RAY EXAM CHEST 1 VIEW: CPT

## 2024-11-02 PROCEDURE — 85379 FIBRIN DEGRADATION QUANT: CPT

## 2024-11-02 PROCEDURE — 93010 ELECTROCARDIOGRAM REPORT: CPT

## 2024-11-02 PROCEDURE — 80053 COMPREHEN METABOLIC PANEL: CPT

## 2024-11-02 NOTE — ED PROVIDER NOTE - CARE PROVIDER_API CALL
Valdo Nick  Cardiology  [address] [place] [address]  Phone: [phone]  Fax: [phone]  Follow Up Time: 1-3 Days

## 2024-11-02 NOTE — ED PROVIDER NOTE - NSICDXPASTSURGICALHX_GEN_ALL_CORE_FT
Subjective   Piotr is a 8 week old male who is accompanied by:mother     Well Child Assessment:  Piotr lives with his mother, father and brother.   Safety  Home is child-proofed? yes. There is no smoking in the home. Home has working smoke alarms? yes. Home has working carbon monoxide alarms? yes. There is an appropriate car seat in use.     Interval hx:   - music therapy- monthly right now- using transition songs  - saw heme 11/1 to follow up NBS results for possible hgb C trait.  HCT not usually not associated with any hematologic abnormalities, and patients with HCT usually have normal CBCs/hemoglobin levels       HPI     DIET:  Breastmilk or formula: nursing and pumped milk  He feeds q 2-3 hrs  Overnight he feeds a bit less, sleeps 5-8 hrs stretch at night    Vitamin D: yes    Elimination:   - Problems with diarrhea: no  - Problems with constipation: no  Usually stools qd or qod    Sleep:  - location of sleep: basinette on his back  - sleep issues: no  - do you have a sleep routine for infant:  music, feed   Read with brother at 6pm  But then feeds again at 9pm and then he has his longer stretch    /  - Who watches your child during the day: mom on 3 mo leave; started some part time- a few cases for now. One time per week  - Does your child receive any therapies: had KARY kohli  Started ST today and feeding/nutri each one time per month. So far is on track  Will get PT weekly (has a left side preference) , and OT every other week  It all started this week    2 month developmental questions:  Has a social smile : yes  Simple cooing: yes  Lifts head and chest when supine: yes  Opens and shuts hands: yes    Additional concerns today: dry skin   Coconut oil not working  Tried dove soap and lotion  Moisturize 3x per day  Tried honest brand    He does spit up more when he nurses vs the bottle and she can pace him more.  Intermittent noisy breathing that is positional and not constant  (laryngomalacia)      Review of Systems   Gastrointestinal:        Spit up   Skin:        Dry skin   All other systems reviewed and are negative.      Patient's medications, allergies, past medical, surgical, social and family histories were reviewed and updated as appropriate.    Objective   Visit Vitals  Temp 98.7 °F (37.1 °C) (Temporal)   Ht 22.36\" (56.8 cm)   Wt 5.57 kg (12 lb 4.5 oz)   HC 40.7 cm (16.02\")   BMI 17.26 kg/m²     50 %ile (Z= 0.00) based on WHO (Boys, 0-2 years) weight-for-age data using vitals from 2023.    Physical Exam  Vitals and nursing note reviewed.   Constitutional:       General: He is active. He has a strong cry.      Appearance: Normal appearance. He is well-developed.   HENT:      Head: Normocephalic. Anterior fontanelle is flat.      Right Ear: Tympanic membrane, ear canal and external ear normal.      Left Ear: Tympanic membrane, ear canal and external ear normal.      Nose: Nose normal.      Mouth/Throat:      Mouth: Mucous membranes are moist.      Pharynx: Oropharynx is clear.      Neck: Normal range of motion and neck supple.   Eyes:      Extraocular Movements: Extraocular movements intact.      Conjunctiva/sclera: Conjunctivae normal.      Pupils: Pupils are equal, round, and reactive to light.   Cardiovascular:      Rate and Rhythm: Normal rate and regular rhythm.      Pulses: Normal pulses. Pulses are strong.      Heart sounds: Normal heart sounds, S1 normal and S2 normal. No murmur heard.  Pulmonary:      Effort: Pulmonary effort is normal.      Breath sounds: Normal breath sounds.   Abdominal:      General: Bowel sounds are normal. There is no distension.      Palpations: Abdomen is soft. There is no mass.      Tenderness: There is no abdominal tenderness.      Hernia: No hernia is present.   Genitourinary:     Penis: Normal and circumcised.       Testes: Normal.      Rectum: Normal.   Musculoskeletal:         General: Normal range of motion.      Right hip: Negative  right Ortolani and negative right Doll.      Left hip: Negative left Ortolani and negative left Doll.   Lymphadenopathy:      Cervical: No cervical adenopathy.   Skin:     General: Skin is warm and dry.      Capillary Refill: Capillary refill takes less than 2 seconds.      Turgor: Normal.      Findings: No rash.      Comments: Skin overall looks good today, minimal dryness   Neurological:      General: No focal deficit present.      Mental Status: He is alert.      Motor: No abnormal muscle tone.         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 Mom very proactive with getting him therapies    Next visit at 4 months of age    ECZEMA /DRYSKIN CARE:  for soap use: dove, cerave, eucerin, cetaphil, or aveeno, aquaphor. NOT JOHNSONS or BABY MAGIC  for emollient use one of the following 2x per day ALL THE TIME: vaseline, aquaphor, cerave, cetaphil, eucerin. Some might mix the greasy aquaphor or vaseline with one of the lotions (cetaphil, eucerin, cerave) as it helps it absorb better into the skin  Apply emollient immediately after bath; do not rub with towel after bath- pat dry.    TUBBY TODARLINE- have had patients with good success with this. Can get off AMAZON. An emollient you can use 2-3x per day every day    Screening tests  Developmental milestones were reviewed and were: normal based on age.    Immunizations today: per orders.  History of previous adverse  reactions to immunizations? no  Immunizations given today and vaccine counseling including benefits, risks, and adverse reactions were provided by myself during the visit.    Follow-up for the 4 month well child visit, or sooner as needed.   PAST SURGICAL HISTORY:  History of orchiectomy

## 2024-11-02 NOTE — ED ADULT NURSE NOTE - NSFALLUNIVINTERV_ED_ALL_ED
Bed/Stretcher in lowest position, wheels locked, appropriate side rails in place/Call bell, personal items and telephone in reach/Instruct patient to call for assistance before getting out of bed/chair/stretcher/Non-slip footwear applied when patient is off stretcher/[place] to call system/Physically safe environment - no spills, clutter or unnecessary equipment/Purposeful proactive rounding/Room/bathroom lighting operational, light cord in reach

## 2024-11-02 NOTE — ED PROVIDER NOTE - OBJECTIVE STATEMENT
34-year-old male with past medical history of factitious disorder, autism, hyperlipidemia, diabetes, intellectual disability presents today due to chest pain for 1 week.  Patient describes pain as aching, nonradiating, and currently 5 out of 10.  Patient does admit to some shortness of breath with the symptoms.  Patient denies syncope, hemoptysis, abdominal pain, fever, cough, vomiting, or any other complaints.

## 2024-11-02 NOTE — ED ADULT NURSE NOTE - OBJECTIVE STATEMENT
Patient received complaining of chest pain with some dizziness for a week, from [facility] facility with aide at bedside. Patient is AOx3, EKG done, safety precautions in place, awaiting evaluation

## 2024-11-02 NOTE — ED PROVIDER NOTE - PATIENT PORTAL LINK FT
You can access the FollowMyHealth Patient Portal offered by [facility] by registering at the following website: [email or web address]. By joining [facility]’s FollowMyHealth portal, you will also be able to view your health information using other applications (apps) compatible with our system.

## 2024-11-02 NOTE — ED PROVIDER NOTE - CLINICAL SUMMARY MEDICAL DECISION MAKING FREE TEXT BOX
34 male c/o chest pain, f/u EKG, cardiac enzymes, chest xray, patient awake, no acute distress, heart and lungs clear

## 2024-11-02 NOTE — ED PROVIDER NOTE - NSFOLLOWUPINSTRUCTIONS_ED_ALL_ED_FT
Follow up with your primary care doctor and cardiology. Return for chest pain, shortness of breath, dizziness, worsening condition    Chest Pain    AMBULATORY CARE:    Chest pain can be caused by a range of conditions, from not serious to life-threatening. It is important to follow up with your healthcare provider to find the cause of your chest pain.    Common symptoms you may have with chest pain:    Fever or sweating    Nausea or vomiting    Shortness of breath    Discomfort or pressure that spreads from your chest to your back, jaw, or arm    A racing or slow heartbeat    Feeling weak, tired, or faint  Call your local emergency number (911 in the [place]) or have someone call if:    You have any of the following signs of a heart attack:  Squeezing, pressure, or pain in your chest    You may also have any of the following:  Discomfort or pain in your back, neck, jaw, stomach, or arm    Shortness of breath    Nausea or vomiting    Lightheadedness or a sudden cold sweat    Seek care immediately if:    You have chest discomfort that gets worse, even with medicine.    You cough or vomit blood.    Your bowel movements are black or bloody.    You cannot stop vomiting, or it hurts to swallow.  Call your doctor if:    You have questions or concerns about your condition or care.    Treatment for chest pain may include medicine to treat your symptoms while your healthcare provider finds the cause of your chest pain.    Medicines may be given to treat the cause of your chest pain. Examples include pain medicine, anxiety medicine, or medicines to increase blood flow to your heart.    Do not take certain medicines without asking your healthcare provider first. These include NSAIDs, herbal or vitamin supplements, and hormones, such as estrogen or progestin.    One or more stents may need to be placed in your heart if pain was caused by blockage. A stent is a wire mesh tube that helps hold your artery open.  Healthy living tips: If the cause of your chest pain is known, your healthcare provider will give you specific guidelines to follow. The following are general healthy guidelines:    Do not smoke. Nicotine and other chemicals in cigarettes and cigars can cause lung and heart damage. Ask your healthcare provider for information if you currently smoke and need help to quit. E-cigarettes or smokeless tobacco still contain nicotine. Talk to your healthcare provider before you use these products.    Choose a variety of healthy foods as often as possible. Include fresh, frozen, or canned fruits and vegetables. Also include low-fat dairy products, fish, chicken (without skin), and lean meats. Your healthcare provider or a dietitian can help you create meal plans. You may need to avoid certain foods or drinks if your pain is caused by a digestion problem.  Healthy Foods      Lower your sodium (salt) intake. Limit foods that are high in sodium, such as canned foods, salty snacks, and cold cuts. If you add salt when you cook food, do not add more at the table. Choose low-sodium canned foods as much as possible.        Drink plenty of water every day. Water helps your body to control your temperature and blood pressure. Ask your healthcare provider how much water you should drink every day.    Ask about activity. Your healthcare provider will tell you which activities to limit or avoid. Ask when you can drive, return to work, and have sex. Ask about the best exercise plan for you.    Maintain a healthy weight. Ask your healthcare provider what a healthy weight is for you. Ask him or her to help you create a safe weight loss plan if you are overweight.    Ask about vaccines you may need. Your healthcare provider can tell you which vaccines you need, and when to get them. The following vaccines help prevent diseases that can become serious for a person with a heart condition:  The influenza (flu) vaccine is given each year. Get a flu vaccine as soon as recommended, usually in September or October.    The pneumonia vaccine is usually given every 5 years. Your healthcare provider may recommend the pneumonia vaccine if you are 65 or older.    COVID-19 vaccines are given to adults as a shot. At least 1 dose of an updated vaccine is recommended for all adults. COVID-19 vaccines are updated throughout the year. Adults 65 or older need a second dose of updated vaccine at least 4 months after the first dose. Your healthcare provider can help you schedule all needed doses as updated vaccines become available.  Prevent Heart Disease   Follow up with your doctor within 72 hours, or as directed: You may need to return for more tests to find the cause of your chest pain. You may be referred to a specialist, such as a cardiologist or gastroenterologist. Write down your questions so you remember to ask them during your visits.

## 2024-11-02 NOTE — ED ADULT TRIAGE NOTE - NSSEPSISSUSPECTED_ED_A_ED
"Chief Complaint:  Infertility    HPI:    Mr. Sy is a 24 y.o.  male who has been  to his wife for the past 3 years. They have been trying to achieve a pregnancy for the past 1 years but without success. 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 He denies a history of erectile dysfunction and ejaculatory problems.    He has achieved 0 pregnancies in the past.    SA 23 (EVAN)--4.6 cc/8.55 million per cc/60%/4%  SA 23 (EVAN)--6.6 cc/7.2 million per cc/41%/4%    Sammie Sy is 23 years old. ( 99) Her menses are regular. She has not undergone prior hysterosalpingogram. She has achieved 0 prior pregnancies.  She sees Dr. Bailey.    The couple has not undergone prior intrauterine insemination procedures.    The couple has not undergone prior in-vitro fertilization procedures.    Zhao Sy denies a history of exposure to harmful chemicals, toxins, and radiation.    No history of recent fevers greater than 101.5 degrees Farenheit.    No history of recent exposure to "wet heat."    No history of urological trauma or testicular torsion.    No history of prostatitis, epididymitis, and orchitis.    No history of post-pubertal mumps.    There is no known family history of fertility problems.    REVIEW OF SYSTEMS:     He denies headache, blurred vision, fever, nausea, vomiting, chills, abdominal pain, chest pain, sore throat, bleeding per rectum, cough, SOB, recent loss of consciousness, recent mental status changes, seizures, dizziness, or upper or lower extremity weakness.    PHYSICAL EXAM:     The patient generally appears in good health, is appropriately interactive, and is in no apparent distress.     Eyes: anicteric sclerae, moist conjunctivae; no lid-lag; PERRLA     HENT: Atraumatic; oropharynx clear with moist mucous membranes and no mucosal ulcerations;normal hard and soft palate.  No evidence of " "lymphadenopathy.    Neck: Trachea midline.  No thyromegaly.    Skin: No lesions.    Mental: Cooperative with normal affect.  Is oriented to time, place, and person.    Neuro: Grossly intact.    Chest: Normal inspiratory effort.   No accessory muscles.  No audible wheezes.  Respirations symmetric on inspiration and expiration.    Heart: Regular rhythm.      Abdomen:  Soft, non-tender. No masses or organomegaly. Bladder is not palpable. No evidence of flank discomfort. No evidence of inguinal hernia.    Genitourinary: Penis is normal with no evidence of plaques or induration. Urethral meatus is normal. Scrotum is normal. Testes are descended bilaterally with no evidence of abnormal masses or tenderness. Epididymis, vas deferens, and cord structures are normal bilaterally.  Testicular volume is approximately 18 cc on the R and 17 cc on the L.  He a L grade II varicocele.    Extremities: No cyanosis, clubbing, or edema.    IMPRESSION & PLAN:    Mr. Sy is a 24 y.o.  male who has been  to his wife for the past 3 years. They have been trying to achieve a pregnancy for the past 1 years but without success. 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 He denies a history of erectile dysfunction and ejaculatory problems.    He has achieved 0 pregnancies in the past.    SA 7/20/23 (EVAN)--4.6 cc/8.55 million per cc/60%/4%  SA 9/12/23 (EVAN)--6.6 cc/7.2 million per cc/41%/4%    He a L grade II varicocele.    1.  FSH, LH, testosterone, prolactin, and estradiol serum levels today.  Will phone review.  2.  Independently interpreted his outside SA's today.   3.  Discussed varicocele's potential impact on fertility.  Recommend correction.  4.  Recommend avoiding "wet heat."  5.  Recommend taking a multivitamin and 500 mg of vitamin c daily in addition to the multivitamin.  6.  Please send a copy of the note to Dr. Bailey.  Thank you for the " consultation.  7. We discussed the risks and benefits of varicocele repair.  We specifically addressed the chance of failure to improve semen parameters, bleeding, infection, pain, loss of testicle, damage to the vas.  We discussed this amongst other risks and discussed alternatives.  He was given the chance to ask questions.  Will schedule for elective varicocele repair.      CC: Lynn       No

## 2024-11-02 NOTE — ED PROVIDER NOTE - NSICDXFAMILYHX_GEN_ALL_CORE_FT
[8] : 8 [0] : 0 [Localized] : localized [Sharp] : sharp [Standing] : standing [Walking] : walking [Stairs] : stairs [Student] : Work status: student [de-identified] : 3-15-23- She states dancing this past sunday developed pain and swelling left 1st mtp joint has been ambulating with a limp  [] : Post Surgical Visit: no [FreeTextEntry1] : Lt foot [FreeTextEntry5] : Patient complains of pain in her left foot after her dance competion on 3/12/23 FAMILY HISTORY:  Mother  Still living? Unknown  FH: diabetes mellitus, Age at diagnosis: Age Unknown

## 2024-11-03 ENCOUNTER — EMERGENCY (EMERGENCY)
Facility: HOSPITAL | Age: 34
LOS: 0 days | Discharge: HOME | End: 2024-11-04
Attending: EMERGENCY MEDICINE
Payer: MEDICAID

## 2024-11-03 VITALS
RESPIRATION RATE: 16 BRPM | SYSTOLIC BLOOD PRESSURE: 128 MMHG | DIASTOLIC BLOOD PRESSURE: 87 MMHG | HEART RATE: 90 BPM | HEIGHT: 69 IN | OXYGEN SATURATION: 100 % | TEMPERATURE: 98 F

## 2024-11-03 DIAGNOSIS — N39.0 URINARY TRACT INFECTION, SITE NOT SPECIFIED: ICD-10-CM

## 2024-11-03 DIAGNOSIS — J45.909 UNSPECIFIED ASTHMA, UNCOMPLICATED: ICD-10-CM

## 2024-11-03 DIAGNOSIS — E11.9 TYPE 2 DIABETES MELLITUS WITHOUT COMPLICATIONS: ICD-10-CM

## 2024-11-03 DIAGNOSIS — K21.9 GASTRO-ESOPHAGEAL REFLUX DISEASE WITHOUT ESOPHAGITIS: ICD-10-CM

## 2024-11-03 DIAGNOSIS — F84.0 AUTISTIC DISORDER: ICD-10-CM

## 2024-11-03 DIAGNOSIS — N40.0 BENIGN PROSTATIC HYPERPLASIA WITHOUT LOWER URINARY TRACT SYMPTOMS: ICD-10-CM

## 2024-11-03 DIAGNOSIS — R45.851 SUICIDAL IDEATIONS: ICD-10-CM

## 2024-11-03 DIAGNOSIS — R31.9 HEMATURIA, UNSPECIFIED: ICD-10-CM

## 2024-11-03 DIAGNOSIS — I10 ESSENTIAL (PRIMARY) HYPERTENSION: ICD-10-CM

## 2024-11-03 DIAGNOSIS — Z88.8 ALLERGY STATUS TO OTHER DRUGS, MEDICAMENTS AND BIOLOGICAL SUBSTANCES: ICD-10-CM

## 2024-11-03 DIAGNOSIS — F79 UNSPECIFIED INTELLECTUAL DISABILITIES: ICD-10-CM

## 2024-11-03 DIAGNOSIS — E78.5 HYPERLIPIDEMIA, UNSPECIFIED: ICD-10-CM

## 2024-11-03 DIAGNOSIS — E03.9 HYPOTHYROIDISM, UNSPECIFIED: ICD-10-CM

## 2024-11-03 LAB
ALBUMIN SERPL ELPH-MCNC: 3.3 G/DL — SIGNIFICANT CHANGE UP (ref 3.3–5)
ALP SERPL-CCNC: 78 U/L — SIGNIFICANT CHANGE UP (ref 40–120)
ALT FLD-CCNC: 43 U/L — SIGNIFICANT CHANGE UP (ref 12–78)
ANION GAP SERPL CALC-SCNC: 5 MMOL/L — SIGNIFICANT CHANGE UP (ref 5–17)
APAP SERPL-MCNC: <2 UG/ML — LOW (ref 10–30)
APPEARANCE UR: ABNORMAL
AST SERPL-CCNC: 17 U/L — SIGNIFICANT CHANGE UP (ref 15–37)
BASOPHILS # BLD AUTO: 0.04 K/UL — SIGNIFICANT CHANGE UP (ref 0–0.2)
BASOPHILS NFR BLD AUTO: 0.6 % — SIGNIFICANT CHANGE UP (ref 0–2)
BILIRUB SERPL-MCNC: 0.2 MG/DL — SIGNIFICANT CHANGE UP (ref 0.2–1.2)
BILIRUB UR-MCNC: SIGNIFICANT CHANGE UP
BUN SERPL-MCNC: 12 MG/DL — SIGNIFICANT CHANGE UP (ref 7–23)
CALCIUM SERPL-MCNC: 8.7 MG/DL — SIGNIFICANT CHANGE UP (ref 8.5–10.1)
CHLORIDE SERPL-SCNC: 106 MMOL/L — SIGNIFICANT CHANGE UP (ref 96–108)
CO2 SERPL-SCNC: 28 MMOL/L — SIGNIFICANT CHANGE UP (ref 22–31)
COLOR SPEC: ABNORMAL
CREAT SERPL-MCNC: 1.06 MG/DL — SIGNIFICANT CHANGE UP (ref 0.5–1.3)
DIFF PNL FLD: SIGNIFICANT CHANGE UP
EGFR: 94 ML/MIN/1.73M2 — SIGNIFICANT CHANGE UP
EOSINOPHIL # BLD AUTO: 0.33 K/UL — SIGNIFICANT CHANGE UP (ref 0–0.5)
EOSINOPHIL NFR BLD AUTO: 4.7 % — SIGNIFICANT CHANGE UP (ref 0–6)
ETHANOL SERPL-MCNC: <10 MG/DL — SIGNIFICANT CHANGE UP (ref 0–10)
GLUCOSE SERPL-MCNC: 219 MG/DL — HIGH (ref 70–99)
GLUCOSE UR QL: SIGNIFICANT CHANGE UP
HCT VFR BLD CALC: 43.3 % — SIGNIFICANT CHANGE UP (ref 39–50)
HGB BLD-MCNC: 13.4 G/DL — SIGNIFICANT CHANGE UP (ref 13–17)
IMM GRANULOCYTES NFR BLD AUTO: 0.8 % — SIGNIFICANT CHANGE UP (ref 0–0.9)
KETONES UR-MCNC: SIGNIFICANT CHANGE UP
LEUKOCYTE ESTERASE UR-ACNC: SIGNIFICANT CHANGE UP
LYMPHOCYTES # BLD AUTO: 2.67 K/UL — SIGNIFICANT CHANGE UP (ref 1–3.3)
LYMPHOCYTES # BLD AUTO: 37.8 % — SIGNIFICANT CHANGE UP (ref 13–44)
MCHC RBC-ENTMCNC: 24.3 PG — LOW (ref 27–34)
MCHC RBC-ENTMCNC: 30.9 G/DL — LOW (ref 32–36)
MCV RBC AUTO: 78.4 FL — LOW (ref 80–100)
MONOCYTES # BLD AUTO: 0.45 K/UL — SIGNIFICANT CHANGE UP (ref 0–0.9)
MONOCYTES NFR BLD AUTO: 6.4 % — SIGNIFICANT CHANGE UP (ref 2–14)
NEUTROPHILS # BLD AUTO: 3.51 K/UL — SIGNIFICANT CHANGE UP (ref 1.8–7.4)
NEUTROPHILS NFR BLD AUTO: 49.7 % — SIGNIFICANT CHANGE UP (ref 43–77)
NITRITE UR-MCNC: SIGNIFICANT CHANGE UP
PCP SPEC-MCNC: SIGNIFICANT CHANGE UP
PH UR: 6 — SIGNIFICANT CHANGE UP (ref 5–8)
PLATELET # BLD AUTO: 215 K/UL — SIGNIFICANT CHANGE UP (ref 150–400)
POTASSIUM SERPL-MCNC: 3.6 MMOL/L — SIGNIFICANT CHANGE UP (ref 3.5–5.3)
POTASSIUM SERPL-SCNC: 3.6 MMOL/L — SIGNIFICANT CHANGE UP (ref 3.5–5.3)
PROT SERPL-MCNC: 6.7 GM/DL — SIGNIFICANT CHANGE UP (ref 6–8.3)
PROT UR-MCNC: SIGNIFICANT CHANGE UP
RBC # BLD: 5.52 M/UL — SIGNIFICANT CHANGE UP (ref 4.2–5.8)
RBC # FLD: 14.9 % — HIGH (ref 10.3–14.5)
SALICYLATES SERPL-MCNC: <1.7 MG/DL — LOW (ref 2.8–20)
SODIUM SERPL-SCNC: 139 MMOL/L — SIGNIFICANT CHANGE UP (ref 135–145)
SP GR SPEC: SIGNIFICANT CHANGE UP (ref 1–1.03)
UROBILINOGEN FLD QL: SIGNIFICANT CHANGE UP (ref 0.2–1)
WBC # BLD: 7.06 K/UL — SIGNIFICANT CHANGE UP (ref 3.8–10.5)
WBC # FLD AUTO: 7.06 K/UL — SIGNIFICANT CHANGE UP (ref 3.8–10.5)

## 2024-11-03 PROCEDURE — 85025 COMPLETE CBC W/AUTO DIFF WBC: CPT

## 2024-11-03 PROCEDURE — 93005 ELECTROCARDIOGRAM TRACING: CPT

## 2024-11-03 PROCEDURE — 81001 URINALYSIS AUTO W/SCOPE: CPT

## 2024-11-03 PROCEDURE — 99284 EMERGENCY DEPT VISIT MOD MDM: CPT | Mod: 25

## 2024-11-03 PROCEDURE — 36415 COLL VENOUS BLD VENIPUNCTURE: CPT

## 2024-11-03 PROCEDURE — 93010 ELECTROCARDIOGRAM REPORT: CPT

## 2024-11-03 PROCEDURE — 80307 DRUG TEST PRSMV CHEM ANLYZR: CPT

## 2024-11-03 PROCEDURE — 99285 EMERGENCY DEPT VISIT HI MDM: CPT

## 2024-11-03 PROCEDURE — 80053 COMPREHEN METABOLIC PANEL: CPT

## 2024-11-03 RX ORDER — ACETAMINOPHEN 500 MG
650 TABLET ORAL ONCE
Refills: 0 | Status: COMPLETED | OUTPATIENT
Start: 2024-11-03 | End: 2024-11-03

## 2024-11-03 RX ADMIN — Medication 650 MILLIGRAM(S): at 23:32

## 2024-11-03 NOTE — ED BEHAVIORAL HEALTH ASSESSMENT NOTE - CURRENT MEDICATION
per chart review 10/9/2024:  atorvastatin 10 milliGRAM(s) Oral at bedtime  busPIRone 15 milliGRAM(s) Oral <User Schedule>  cloNIDine 0.3 milliGRAM(s) Oral every 12 hours  dextrose 5%. 1000 milliLiter(s) (100 mL/Hr) IV Continuous <Continuous>  dextrose 5%. 1000 milliLiter(s) (50 mL/Hr) IV Continuous <Continuous>  dextrose 50% Injectable 25 Gram(s) IV Push once  dextrose 50% Injectable 12.5 Gram(s) IV Push once  dextrose 50% Injectable 25 Gram(s) IV Push once  divalproex  milliGRAM(s) Oral <User Schedule>  divalproex ER 1000 milliGRAM(s) Oral <User Schedule>  enoxaparin Injectable 40 milliGRAM(s) SubCutaneous every 12 hours  glucagon  Injectable 1 milliGRAM(s) IntraMuscular once  influenza   Vaccine 0.5 milliLiter(s) IntraMuscular once  insulin lispro (ADMELOG) corrective regimen sliding scale   SubCutaneous three times a day before meals  insulin lispro (ADMELOG) corrective regimen sliding scale   SubCutaneous at bedtime  levothyroxine 50 MICROGram(s) Oral daily  mirtazapine 22.5 milliGRAM(s) Oral at bedtime  pantoprazole    Tablet 40 milliGRAM(s) Oral before breakfast  risperiDONE   Tablet 4 milliGRAM(s) Oral <User Schedule>  senna 2 Tablet(s) Oral at bedtime  tamsulosin 0.8 milliGRAM(s) Oral at bedtime  testosterone 1% Gel 50 milliGRAM(s) Topical daily    MEDICATIONS  (PRN):  acetaminophen     Tablet .. 650 milliGRAM(s) Oral every 6 hours PRN Temp greater or equal to 38C (100.4F), Mild Pain (1 - 3)  dextrose Oral Gel 15 Gram(s) Oral once PRN Blood Glucose LESS THAN 70 milliGRAM(s)/deciliter

## 2024-11-03 NOTE — ED ADULT NURSE NOTE - NSFALLUNIVINTERV_ED_ALL_ED
Bed/Stretcher in lowest position, wheels locked, appropriate side rails in place/Call bell, personal items and telephone in reach/Instruct patient to call for assistance before getting out of bed/chair/stretcher/Non-slip footwear applied when patient is off stretcher/[place] to call system/Physically safe environment - no spills, clutter or unnecessary equipment/Purposeful proactive rounding/Room/bathroom lighting operational, light cord in reach

## 2024-11-03 NOTE — ED PROVIDER NOTE - OBJECTIVE STATEMENT
35 y/o M with PMH of BPH, autism, HLD, asthma, DM2 presents with suicidal ideation. Pt states he's been experiencing symptoms intermittently over the past month, most recently today. Denies having plan on how to hurt himself. Denies AH/VH, HI. Denies ETOH use, illicit drug use. Of note, patient currently being treated for UTI with cipro and pyridium.

## 2024-11-03 NOTE — ED ADULT TRIAGE NOTE - CHIEF COMPLAINT QUOTE
pt presents to ED due to hematuria from a group home en route to ED pt stated to EMS SI statements pt calm and cooperative

## 2024-11-03 NOTE — ED PROVIDER NOTE - NSICDXPASTMEDICALHX_GEN_ALL_CORE_FT
"History and Physical -  [facility]  Duncan Sánchez 70 y.o. male MRN: [id number]          HPI: Duncan Sánchez is a 70 y.o. year old male who presents for open access screening colonoscopy. He reports having a normal colonoscopy 15 years ago.    No family history of colon cancer.      REVIEW OF SYSTEMS: Per the HPI, and otherwise unremarkable.    Historical Information   History reviewed. No pertinent past medical history.  History reviewed. No pertinent surgical history.  Social History   Social History     Substance and Sexual Activity   Alcohol Use Yes    Comment: socially     Social History     Substance and Sexual Activity   Drug Use Never     Social History     Tobacco Use   Smoking Status Never   Smokeless Tobacco Never     History reviewed. No pertinent family history.    Meds/Allergies       Current Outpatient Medications:     acetaminophen (TYLENOL) 325 mg tablet    gabapentin (Neurontin) 300 mg capsule    losartan (COZAAR) 50 mg tablet    metFORMIN (GLUCOPHAGE) 1000 MG tablet    simvastatin (ZOCOR) 40 mg tablet    sitaGLIPtin (Januvia) 100 mg tablet    tamsulosin (FLOMAX) 0.4 mg    cyclobenzaprine (FLEXERIL) 10 mg tablet    Current Facility-Administered Medications:     lactated ringers infusion, 125 mL/hr, Intravenous, Continuous, 125 mL/hr at 10/04/24 0732    No Known Allergies    Objective     /77   Pulse 79   Temp (!) 96.5 °F (35.8 °C) (Temporal)   Resp 14   Ht 5' 1.81\" (1.57 m)   Wt 68 kg (150 lb)   SpO2 99%   BMI 27.60 kg/m²       PHYSICAL EXAM    GEN: NAD  CARDIO: RRR  PULM: CTA bilaterally  ABD: soft, non-tender, non-distended  EXT: no lower extremity edema  NEURO: AAOx3      ASSESSMENT/PLAN:  70 y.o. year old male here for colonoscopy; he is stable and optimized for his procedure.        " PAST MEDICAL HISTORY:  Asthma     Autism     Autism     Enuresis     Factitious disorder     GERD (gastroesophageal reflux disease)     History of BPH     HLD (hyperlipidemia)     Hypothyroid     Impulse control disorder     Intellectual disability     Myopia of both eyes     Obesity     Testicular cancer     Type 2 diabetes mellitus     Urinary retention
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
## 2024-11-03 NOTE — ED BEHAVIORAL HEALTH ASSESSMENT NOTE - SUMMARY
34 year old male, single, domiciled at [facility] (with 1:1), employed as assistant reader at [employer], with PMH asthma, DM, BPH, urinary retention, hx testicular cancer s/p orchidectomy, GERD, hypothyroidism, HTN, HLD, myopia, PPH autism spectrum disorder, moderate intellectual disability; other diagnoses includes bipolar disorder, impulse control disorder, HAVEN, mood disorder NOS, PTSD, ADHD and factitious disorder. with multiple past inpatient psychiatric admissions (as per [employer] staff, most recently 4/2024, hx of cutting himself, charted longstanding hx of endorsing SI (situational: mostly stemming from discontent of living environment) as well as hx of aggression toward [employer] staff; consult called to evaluate possible  SI.     Current presentation similar to multiple prior, with patient requesting inpatient psychiatric admission for medication adjustments, appears to struggle with poor impulse control, frequent ED presentations (incl 11/2) for various medical and psychiatric complaints. Psychiatric residence denies any safety concerns, ongoing medication adherence, patient is additionally on 24 hr 1:1 observation. Despite reports of ongoing suicidal thoughts, pt is notably help-seeking, describes suicidal thoughts as ego-dystonic, motivated to engage in treatment. Discussed patient's concerns regarding medications, facility corroborates plan to arrange earlier appointment with outpatient psychiatrist. No psychiatric indication for hospitalization at this time.

## 2024-11-03 NOTE — ED BEHAVIORAL HEALTH ASSESSMENT NOTE - HPI (INCLUDE ILLNESS QUALITY, SEVERITY, DURATION, TIMING, CONTEXT, MODIFYING FACTORS, ASSOCIATED SIGNS AND SYMPTOMS)
Patient is a 34 year old male, single, domiciled at [facility] (with 1:1), employed as assistant reader at [employer], with PMH asthma, DM, BPH, urinary retention, hx testicular cancer s/p orchidectomy, GERD, hypothyroidism, HTN, HLD, myopia, PPH autism spectrum disorder, moderate intellectual disability; other diagnoses includes bipolar disorder, impulse control disorder, HAVEN, mood disorder NOS, PTSD, ADHD and factitious disorder. with multiple past inpatient psychiatric admissions (as per [employer] [employer] staff, most recently 4/2024, hx of cutting himself, charted longstanding hx of endorsing SI (situational: mostly stemming from discontent of living environment) as well as hx of aggression toward [employer] staff; consult called to evaluate possible  SI.     On eval, patient with bright affect, smiling at staff, concrete thought process- preoccupied with admission, answering questions appropriately. States that he has been going to multiple hospitals requesting inpatient admission to psychiatry, as his outpatient provider will not adjust his medication regimen. Denies any particular trigger. States that he has felt more anxious over the past two months, with fluctuating suicidal thoughts, which he describes as thoughts of cutting himself with pieces of plastic, or of 'acting out' towards staff. States that he otherwise gets along with staff, his 1:1, and his one other peer at his [facility] home, enjoys going to work every Tuesday (works as an assistant reader), speaking with his mother over the phone, and playing video games. He denies any sleep issues or appetite change, AVH. When discussing suicidal thoughts, denies any specific plan or intent to harm self, describing suicidal thoughts as ego dystonic. Pt describes thoughts of harming himself if he were to be discharge - when discussing this with presence of 1:1, staff member confirms that pt is under constant observation and has no access to means of harm.     Collateral:  KAYLIE (, [facility], [phone]): States that pt is well known to [employer], frequently calls EMS to be brought to the hospital, presentation has been at baseline. Pt similarly went to the ED c/o pain. States that symptoms are often facetious and attention-seeking, despite boundary setting by staff. Pt has a history of cutting himself with blunt objects (last cut himself 1 mo ago). Continues to see outpatient provider at [facility]. Denies any safety concerns and agrees with plan to discharge back to group home.     PSYCKES: Not available  ISTOP: Not available  Chart reviewed: Has several recent ED visits for same--per past notes frequenting EDs reporting SI, requesting admission is consistent w pt's baseline.                   Patient is a 35 y/o SWM PMH of Bipolar Disorder, BPH, ADHD, MR, Hypothyroidism, Asthma, DM2, HTN, HLD, autism, behavioral disorder requiring previous inpatient psychiatric admissions and constant 1:1 observation, BPH with urinary retention, hx testicular cancer s/p b/l orchiectomy BIBEMS from [facility] young adults home resides at [facility] in [place] comes in  for urinary retention; last urination was this AM with dysuria. States he normally has these symptoms requiring a catheter. ED provider spoke with adult home supervisor KAYLIE ([phone]), and she states the group home does not have the resources to take care and manage the mckeon cath. In addition, patient has an old RUE scar from prior self injurious behavior from last week. Patient states he had thoughts of hurting himself this AM; however currently does not have any SI/HI/hallucinations. Patient at this time appears to be calm although requests inpatient psychiatric admission for unclear reasons.

## 2024-11-03 NOTE — ED PROVIDER NOTE - PHYSICAL EXAMINATION
Gen: Well appearing.  HEENT: NC/AT. Dentition in poor repair.   Cardiac: s1s2, RRR.  Lungs: CTAB,  Abdomen: NBS x4, soft, nontender.  MSK: No obvious deformity to extremities.  PV: No LE edema or calf tenderness. Distal pulses 2+ in all extremities.

## 2024-11-03 NOTE — ED PROVIDER NOTE - PATIENT PORTAL LINK FT
You can access the FollowMyHealth Patient Portal offered by [facility] by registering at the following website: [email or web address]. By joining [facility]’s FollowMyHealth portal, you will also be able to view your health information using other applications (apps) compatible with our system.

## 2024-11-03 NOTE — ED BEHAVIORAL HEALTH ASSESSMENT NOTE - AXIS III
asthma, DM, BPH, urinary retention, hx testicular cancer s/p orchidectomy, GERD, hypothyroidism, HTN, HLD, myopia

## 2024-11-03 NOTE — ED PROVIDER NOTE - PROGRESS NOTE DETAILS
telepsych called for consult. - Santiago Harris PA-C Ramírez: Spoke with telepscy who clears patient psychiatrically for dc. labs nonactionable. patient had complained of difficulty urinating however produced sample in ED. and Was able to void again after wright.s patient is comfortable appearing, No suprapubic tenderness. Ambulating independently in ED. Tolerating PO. Stable for dc. Patient and group home staff verbalizes understanding of return precautions and f/u.

## 2024-11-03 NOTE — ED PROVIDER NOTE - NS ED ROS FT
GEN: Denies fever, chills, sick contacts, recent travel  HEENT: Denies dizziness, blurry vision, HA  Cardiac: Denies CP, SOB, palpitations  Lungs: Denies cough, wheezing  PV: Denies LE swelling  GI: Denies nausea, vomiting, diarrhea, constipation, flank pain  : + hematuria, dysuria Denies frequency, urgency  Skin: Denies rash, redness, open wound  MSK: Denies pain, decreased ROM  Neuro: Denies dizziness, difficulty speaking, difficulty swallowing, numbness/tingling in extremities, loss of bowel/bladder control, weakness in extremities

## 2024-11-03 NOTE — ED BEHAVIORAL HEALTH ASSESSMENT NOTE - SAFETY PLAN ADDT'L DETAILS
Education provided regarding environmental safety / lethal means restriction/Provision of National Suicide Prevention Lifeline 4-640-417-TALK (1292)

## 2024-11-03 NOTE — ED BEHAVIORAL HEALTH ASSESSMENT NOTE - PAST PSYCHOTROPIC MEDICATION
Zyprexa, Haldol, Seroquel, Trilafon, Abilify, Lithium, Tegretol, Trileptal, Topamax, Prozac, Zoloft, Trazodone

## 2024-11-03 NOTE — ED BEHAVIORAL HEALTH ASSESSMENT NOTE - OTHER PAST PSYCHIATRIC HISTORY (INCLUDE DETAILS REGARDING ONSET, COURSE OF ILLNESS, INPATIENT/OUTPATIENT TREATMENT)
multiple past inpatient psychiatric admissions (as per [facility] staff, most recently 4/2024, hx of cutting himself, charted longstanding hx of endorsing SI, no known prior suicide attempts or significant violence; in outpatient treatment at [place]

## 2024-11-03 NOTE — ED PROVIDER NOTE - ATTENDING APP SHARED VISIT CONTRIBUTION OF CARE
Dr. Lin: I performed a face to face bedside interview with patient regarding history of present illness, review of symptoms and past medical history. I completed an independent physical exam.  I have discussed patient's plan of care with PA.   I agree with note as stated above, having amended the EMR as needed to reflect my findings.   This includes HISTORY OF PRESENT ILLNESS, HIV, PAST MEDICAL/SURGICAL/FAMILY/SOCIAL HISTORY, ALLERGIES AND HOME MEDICATIONS, REVIEW OF SYSTEMS, PHYSICAL EXAM, and any PROGRESS NOTES during the time I functioned as the attending physician for this patient.  CANDELARIO Lin DO

## 2024-11-03 NOTE — ED ADULT NURSE NOTE - OBJECTIVE STATEMENT
Pt presents to the ED from group home accompanied by caregiver c/o SI. Pt reports "I've been hearing voices for the past month telling me to kill myself by slashing my throat. The voices became worse today." Pt also reports having burning with urination since Monday, denies fevers or chills, no back pain. Pt is calm and cooperative, 1:1 initiated, belongings locked with security. Denies HI.

## 2024-11-03 NOTE — ED PROVIDER NOTE - CLINICAL SUMMARY MEDICAL DECISION MAKING FREE TEXT BOX
pt here to er with SI pt will get labs and if medically cleared will speak with psych for evaluation
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
## 2024-11-03 NOTE — ED BEHAVIORAL HEALTH ASSESSMENT NOTE - DETAILS
pt requested to be brought to ED pt reports chronic hx fluctuating suicidal thoughts hx aggression towards staff bullying pt declines Zyprexa - rash

## 2024-11-03 NOTE — ED BEHAVIORAL HEALTH ASSESSMENT NOTE - OTHER
limited staff member at [facility] peer chronically impaired by hx discussed indications to return to ED domiciled in supervised setting  at [facility]

## 2024-11-03 NOTE — ED BEHAVIORAL HEALTH ASSESSMENT NOTE - DESCRIPTION
domiciled at [facility] since 9/2024, previously lived in group home in [place], pt reports being transferred d/t bullying asthma, DM, BPH, urinary retention, hx testicular cancer s/p orchidectomy, GERD, hypothyroidism, HTN, HLD, myopia Pt in good behavioral control in ED    Vital Signs Last 24 Hrs  T(C): 36.3 (03 Nov 2024 23:05), Max: 36.7 (03 Nov 2024 18:16)  T(F): 97.3 (03 Nov 2024 23:05), Max: 98 (03 Nov 2024 18:16)  HR: 88 (03 Nov 2024 23:05) (88 - 90)  BP: 107/79 (03 Nov 2024 23:05) (107/79 - 128/87)  BP(mean): 89 (03 Nov 2024 23:05) (89 - 89)  RR: 18 (03 Nov 2024 23:05) (16 - 18)  SpO2: 98% (03 Nov 2024 23:05) (98% - 100%)    Parameters below as of 03 Nov 2024 23:05  Patient On (Oxygen Delivery Method): room air

## 2024-11-04 VITALS
TEMPERATURE: 97 F | SYSTOLIC BLOOD PRESSURE: 101 MMHG | OXYGEN SATURATION: 98 % | DIASTOLIC BLOOD PRESSURE: 79 MMHG | HEART RATE: 86 BPM | RESPIRATION RATE: 18 BRPM

## 2024-11-04 DIAGNOSIS — F79 UNSPECIFIED INTELLECTUAL DISABILITIES: ICD-10-CM

## 2024-11-04 DIAGNOSIS — E61.1 IRON DEFICIENCY: ICD-10-CM

## 2024-11-04 DIAGNOSIS — F84.0 AUTISTIC DISORDER: ICD-10-CM

## 2024-11-04 RX ORDER — FERROUS SULFATE 324(65)MG
324 TABLET, DELAYED RELEASE (ENTERIC COATED) ORAL
Qty: 90 | Refills: 2 | Status: ACTIVE | COMMUNITY
Start: 2024-11-04 | End: 1900-01-01

## 2024-11-05 ENCOUNTER — OUTPATIENT (OUTPATIENT)
Dept: OUTPATIENT SERVICES | Facility: HOSPITAL | Age: 34
LOS: 1 days | End: 2024-11-05

## 2024-11-05 DIAGNOSIS — G47.33 OBSTRUCTIVE SLEEP APNEA (ADULT) (PEDIATRIC): ICD-10-CM

## 2024-11-05 DIAGNOSIS — Z90.79 ACQUIRED ABSENCE OF OTHER GENITAL ORGAN(S): Chronic | ICD-10-CM

## 2024-11-05 PROCEDURE — 95810 POLYSOM 6/> YRS 4/> PARAM: CPT

## 2024-11-08 ENCOUNTER — APPOINTMENT (OUTPATIENT)
Dept: MRI IMAGING | Facility: CLINIC | Age: 34
End: 2024-11-08

## 2024-11-08 ENCOUNTER — INPATIENT (INPATIENT)
Facility: HOSPITAL | Age: 34
LOS: 3 days | Discharge: NURSING, REHAB OR LONG-TERM CARE | DRG: 696 | End: 2024-11-12
Attending: FAMILY MEDICINE | Admitting: INTERNAL MEDICINE
Payer: MEDICAID

## 2024-11-08 ENCOUNTER — OUTPATIENT (OUTPATIENT)
Dept: OUTPATIENT SERVICES | Facility: HOSPITAL | Age: 34
LOS: 1 days | End: 2024-11-08
Payer: MEDICAID

## 2024-11-08 VITALS
HEART RATE: 81 BPM | WEIGHT: 250 LBS | DIASTOLIC BLOOD PRESSURE: 88 MMHG | TEMPERATURE: 98 F | HEIGHT: 69 IN | OXYGEN SATURATION: 98 % | SYSTOLIC BLOOD PRESSURE: 131 MMHG | RESPIRATION RATE: 16 BRPM

## 2024-11-08 DIAGNOSIS — Z90.79 ACQUIRED ABSENCE OF OTHER GENITAL ORGAN(S): Chronic | ICD-10-CM

## 2024-11-08 DIAGNOSIS — H90.3 SENSORINEURAL HEARING LOSS, BILATERAL: ICD-10-CM

## 2024-11-08 PROCEDURE — 70553 MRI BRAIN STEM W/O & W/DYE: CPT

## 2024-11-08 PROCEDURE — 70553 MRI BRAIN STEM W/O & W/DYE: CPT | Mod: 26

## 2024-11-08 PROCEDURE — 99285 EMERGENCY DEPT VISIT HI MDM: CPT

## 2024-11-08 PROCEDURE — A9585: CPT

## 2024-11-08 NOTE — ED ADULT TRIAGE NOTE - CHIEF COMPLAINT QUOTE
Patient brought by ambulance from [facility] as reported having urinary retention fro 5 hours now

## 2024-11-09 DIAGNOSIS — R33.9 RETENTION OF URINE, UNSPECIFIED: ICD-10-CM

## 2024-11-09 DIAGNOSIS — F32.9 MAJOR DEPRESSIVE DISORDER, SINGLE EPISODE, UNSPECIFIED: ICD-10-CM

## 2024-11-09 DIAGNOSIS — N40.0 BENIGN PROSTATIC HYPERPLASIA WITHOUT LOWER URINARY TRACT SYMPTOMS: ICD-10-CM

## 2024-11-09 DIAGNOSIS — I10 ESSENTIAL (PRIMARY) HYPERTENSION: ICD-10-CM

## 2024-11-09 DIAGNOSIS — Z85.47 PERSONAL HISTORY OF MALIGNANT NEOPLASM OF TESTIS: ICD-10-CM

## 2024-11-09 DIAGNOSIS — E11.9 TYPE 2 DIABETES MELLITUS WITHOUT COMPLICATIONS: ICD-10-CM

## 2024-11-09 DIAGNOSIS — F90.9 ATTENTION-DEFICIT HYPERACTIVITY DISORDER, UNSPECIFIED TYPE: ICD-10-CM

## 2024-11-09 DIAGNOSIS — E03.9 HYPOTHYROIDISM, UNSPECIFIED: ICD-10-CM

## 2024-11-09 DIAGNOSIS — Z29.9 ENCOUNTER FOR PROPHYLACTIC MEASURES, UNSPECIFIED: ICD-10-CM

## 2024-11-09 DIAGNOSIS — K21.9 GASTRO-ESOPHAGEAL REFLUX DISEASE WITHOUT ESOPHAGITIS: ICD-10-CM

## 2024-11-09 DIAGNOSIS — E78.5 HYPERLIPIDEMIA, UNSPECIFIED: ICD-10-CM

## 2024-11-09 DIAGNOSIS — F31.9 BIPOLAR DISORDER, UNSPECIFIED: ICD-10-CM

## 2024-11-09 LAB
ALBUMIN SERPL ELPH-MCNC: 3.2 G/DL — LOW (ref 3.3–5)
ALBUMIN SERPL ELPH-MCNC: 3.7 G/DL — SIGNIFICANT CHANGE UP (ref 3.3–5)
ALP SERPL-CCNC: 76 U/L — SIGNIFICANT CHANGE UP (ref 40–120)
ALP SERPL-CCNC: 83 U/L — SIGNIFICANT CHANGE UP (ref 40–120)
ALT FLD-CCNC: 41 U/L — SIGNIFICANT CHANGE UP (ref 12–78)
ALT FLD-CCNC: 49 U/L — SIGNIFICANT CHANGE UP (ref 12–78)
ANION GAP SERPL CALC-SCNC: 6 MMOL/L — SIGNIFICANT CHANGE UP (ref 5–17)
ANION GAP SERPL CALC-SCNC: 6 MMOL/L — SIGNIFICANT CHANGE UP (ref 5–17)
APPEARANCE UR: CLEAR — SIGNIFICANT CHANGE UP
AST SERPL-CCNC: 18 U/L — SIGNIFICANT CHANGE UP (ref 15–37)
AST SERPL-CCNC: 21 U/L — SIGNIFICANT CHANGE UP (ref 15–37)
BASOPHILS # BLD AUTO: 0.03 K/UL — SIGNIFICANT CHANGE UP (ref 0–0.2)
BASOPHILS # BLD AUTO: 0.03 K/UL — SIGNIFICANT CHANGE UP (ref 0–0.2)
BASOPHILS NFR BLD AUTO: 0.4 % — SIGNIFICANT CHANGE UP (ref 0–2)
BASOPHILS NFR BLD AUTO: 0.4 % — SIGNIFICANT CHANGE UP (ref 0–2)
BILIRUB SERPL-MCNC: 0.2 MG/DL — SIGNIFICANT CHANGE UP (ref 0.2–1.2)
BILIRUB SERPL-MCNC: 0.3 MG/DL — SIGNIFICANT CHANGE UP (ref 0.2–1.2)
BILIRUB UR-MCNC: NEGATIVE — SIGNIFICANT CHANGE UP
BUN SERPL-MCNC: 13 MG/DL — SIGNIFICANT CHANGE UP (ref 7–23)
BUN SERPL-MCNC: 9 MG/DL — SIGNIFICANT CHANGE UP (ref 7–23)
CALCIUM SERPL-MCNC: 8.5 MG/DL — SIGNIFICANT CHANGE UP (ref 8.5–10.1)
CALCIUM SERPL-MCNC: 9 MG/DL — SIGNIFICANT CHANGE UP (ref 8.5–10.1)
CHLORIDE SERPL-SCNC: 107 MMOL/L — SIGNIFICANT CHANGE UP (ref 96–108)
CHLORIDE SERPL-SCNC: 108 MMOL/L — SIGNIFICANT CHANGE UP (ref 96–108)
CO2 SERPL-SCNC: 27 MMOL/L — SIGNIFICANT CHANGE UP (ref 22–31)
CO2 SERPL-SCNC: 27 MMOL/L — SIGNIFICANT CHANGE UP (ref 22–31)
COLOR SPEC: YELLOW — SIGNIFICANT CHANGE UP
CREAT SERPL-MCNC: 0.86 MG/DL — SIGNIFICANT CHANGE UP (ref 0.5–1.3)
CREAT SERPL-MCNC: 1.1 MG/DL — SIGNIFICANT CHANGE UP (ref 0.5–1.3)
DIFF PNL FLD: NEGATIVE — SIGNIFICANT CHANGE UP
EGFR: 116 ML/MIN/1.73M2 — SIGNIFICANT CHANGE UP
EGFR: 90 ML/MIN/1.73M2 — SIGNIFICANT CHANGE UP
EOSINOPHIL # BLD AUTO: 0.24 K/UL — SIGNIFICANT CHANGE UP (ref 0–0.5)
EOSINOPHIL # BLD AUTO: 0.26 K/UL — SIGNIFICANT CHANGE UP (ref 0–0.5)
EOSINOPHIL NFR BLD AUTO: 2.9 % — SIGNIFICANT CHANGE UP (ref 0–6)
EOSINOPHIL NFR BLD AUTO: 3.4 % — SIGNIFICANT CHANGE UP (ref 0–6)
GLUCOSE SERPL-MCNC: 110 MG/DL — HIGH (ref 70–99)
GLUCOSE SERPL-MCNC: 127 MG/DL — HIGH (ref 70–99)
GLUCOSE UR QL: NEGATIVE MG/DL — SIGNIFICANT CHANGE UP
HCT VFR BLD CALC: 40.3 % — SIGNIFICANT CHANGE UP (ref 39–50)
HCT VFR BLD CALC: 43.3 % — SIGNIFICANT CHANGE UP (ref 39–50)
HGB BLD-MCNC: 12.9 G/DL — LOW (ref 13–17)
HGB BLD-MCNC: 13.5 G/DL — SIGNIFICANT CHANGE UP (ref 13–17)
IMM GRANULOCYTES NFR BLD AUTO: 0.5 % — SIGNIFICANT CHANGE UP (ref 0–0.9)
IMM GRANULOCYTES NFR BLD AUTO: 0.7 % — SIGNIFICANT CHANGE UP (ref 0–0.9)
KETONES UR-MCNC: ABNORMAL MG/DL
LEUKOCYTE ESTERASE UR-ACNC: NEGATIVE — SIGNIFICANT CHANGE UP
LYMPHOCYTES # BLD AUTO: 2.78 K/UL — SIGNIFICANT CHANGE UP (ref 1–3.3)
LYMPHOCYTES # BLD AUTO: 2.91 K/UL — SIGNIFICANT CHANGE UP (ref 1–3.3)
LYMPHOCYTES # BLD AUTO: 35.2 % — SIGNIFICANT CHANGE UP (ref 13–44)
LYMPHOCYTES # BLD AUTO: 36.2 % — SIGNIFICANT CHANGE UP (ref 13–44)
MCHC RBC-ENTMCNC: 24.1 PG — LOW (ref 27–34)
MCHC RBC-ENTMCNC: 24.5 PG — LOW (ref 27–34)
MCHC RBC-ENTMCNC: 31.2 G/DL — LOW (ref 32–36)
MCHC RBC-ENTMCNC: 32 G/DL — SIGNIFICANT CHANGE UP (ref 32–36)
MCV RBC AUTO: 76.6 FL — LOW (ref 80–100)
MCV RBC AUTO: 77.2 FL — LOW (ref 80–100)
MONOCYTES # BLD AUTO: 0.61 K/UL — SIGNIFICANT CHANGE UP (ref 0–0.9)
MONOCYTES # BLD AUTO: 0.65 K/UL — SIGNIFICANT CHANGE UP (ref 0–0.9)
MONOCYTES NFR BLD AUTO: 7.4 % — SIGNIFICANT CHANGE UP (ref 2–14)
MONOCYTES NFR BLD AUTO: 8.5 % — SIGNIFICANT CHANGE UP (ref 2–14)
NEUTROPHILS # BLD AUTO: 3.91 K/UL — SIGNIFICANT CHANGE UP (ref 1.8–7.4)
NEUTROPHILS # BLD AUTO: 4.42 K/UL — SIGNIFICANT CHANGE UP (ref 1.8–7.4)
NEUTROPHILS NFR BLD AUTO: 51 % — SIGNIFICANT CHANGE UP (ref 43–77)
NEUTROPHILS NFR BLD AUTO: 53.4 % — SIGNIFICANT CHANGE UP (ref 43–77)
NITRITE UR-MCNC: NEGATIVE — SIGNIFICANT CHANGE UP
NRBC # BLD: 0 /100 WBCS — SIGNIFICANT CHANGE UP (ref 0–0)
NRBC # BLD: 0 /100 WBCS — SIGNIFICANT CHANGE UP (ref 0–0)
PH UR: 5.5 — SIGNIFICANT CHANGE UP (ref 5–8)
PLATELET # BLD AUTO: 216 K/UL — SIGNIFICANT CHANGE UP (ref 150–400)
PLATELET # BLD AUTO: 245 K/UL — SIGNIFICANT CHANGE UP (ref 150–400)
POTASSIUM SERPL-MCNC: 3.8 MMOL/L — SIGNIFICANT CHANGE UP (ref 3.5–5.3)
POTASSIUM SERPL-MCNC: 3.9 MMOL/L — SIGNIFICANT CHANGE UP (ref 3.5–5.3)
POTASSIUM SERPL-SCNC: 3.8 MMOL/L — SIGNIFICANT CHANGE UP (ref 3.5–5.3)
POTASSIUM SERPL-SCNC: 3.9 MMOL/L — SIGNIFICANT CHANGE UP (ref 3.5–5.3)
PROT SERPL-MCNC: 6.4 G/DL — SIGNIFICANT CHANGE UP (ref 6–8.3)
PROT SERPL-MCNC: 7.3 G/DL — SIGNIFICANT CHANGE UP (ref 6–8.3)
PROT UR-MCNC: NEGATIVE MG/DL — SIGNIFICANT CHANGE UP
RBC # BLD: 5.26 M/UL — SIGNIFICANT CHANGE UP (ref 4.2–5.8)
RBC # BLD: 5.61 M/UL — SIGNIFICANT CHANGE UP (ref 4.2–5.8)
RBC # FLD: 15 % — HIGH (ref 10.3–14.5)
RBC # FLD: 15.1 % — HIGH (ref 10.3–14.5)
SODIUM SERPL-SCNC: 140 MMOL/L — SIGNIFICANT CHANGE UP (ref 135–145)
SODIUM SERPL-SCNC: 141 MMOL/L — SIGNIFICANT CHANGE UP (ref 135–145)
SP GR SPEC: 1.01 — SIGNIFICANT CHANGE UP (ref 1–1.03)
UROBILINOGEN FLD QL: 0.2 MG/DL — SIGNIFICANT CHANGE UP (ref 0.2–1)
WBC # BLD: 7.67 K/UL — SIGNIFICANT CHANGE UP (ref 3.8–10.5)
WBC # BLD: 8.27 K/UL — SIGNIFICANT CHANGE UP (ref 3.8–10.5)
WBC # FLD AUTO: 7.67 K/UL — SIGNIFICANT CHANGE UP (ref 3.8–10.5)
WBC # FLD AUTO: 8.27 K/UL — SIGNIFICANT CHANGE UP (ref 3.8–10.5)

## 2024-11-09 PROCEDURE — 93010 ELECTROCARDIOGRAM REPORT: CPT

## 2024-11-09 PROCEDURE — 74176 CT ABD & PELVIS W/O CONTRAST: CPT | Mod: 26

## 2024-11-09 PROCEDURE — 99233 SBSQ HOSP IP/OBS HIGH 50: CPT | Mod: GC

## 2024-11-09 RX ORDER — DIVALPROEX SODIUM 250 MG/1
500 TABLET, FILM COATED, EXTENDED RELEASE ORAL
Refills: 0 | Status: DISCONTINUED | OUTPATIENT
Start: 2024-11-09 | End: 2024-11-12

## 2024-11-09 RX ORDER — LIDOCAINE HCL 60 MG/3 ML
5 SYRINGE (ML) INJECTION ONCE
Refills: 0 | Status: COMPLETED | OUTPATIENT
Start: 2024-11-09 | End: 2024-11-09

## 2024-11-09 RX ORDER — CLONIDINE HYDROCHLORIDE 0.2 MG/1
0.3 TABLET ORAL EVERY 12 HOURS
Refills: 0 | Status: DISCONTINUED | OUTPATIENT
Start: 2024-11-09 | End: 2024-11-12

## 2024-11-09 RX ORDER — MIRTAZAPINE 30 MG/1
22.5 TABLET ORAL AT BEDTIME
Refills: 0 | Status: DISCONTINUED | OUTPATIENT
Start: 2024-11-09 | End: 2024-11-12

## 2024-11-09 RX ORDER — TAMSULOSIN HCL 0.4 MG
0.8 CAPSULE ORAL AT BEDTIME
Refills: 0 | Status: DISCONTINUED | OUTPATIENT
Start: 2024-11-09 | End: 2024-11-12

## 2024-11-09 RX ORDER — SENNA 187 MG
2 TABLET ORAL AT BEDTIME
Refills: 0 | Status: DISCONTINUED | OUTPATIENT
Start: 2024-11-09 | End: 2024-11-12

## 2024-11-09 RX ORDER — INSULIN LISPRO 100/ML
VIAL (ML) SUBCUTANEOUS AT BEDTIME
Refills: 0 | Status: DISCONTINUED | OUTPATIENT
Start: 2024-11-09 | End: 2024-11-12

## 2024-11-09 RX ORDER — DIVALPROEX SODIUM 250 MG/1
1000 TABLET, FILM COATED, EXTENDED RELEASE ORAL AT BEDTIME
Refills: 0 | Status: DISCONTINUED | OUTPATIENT
Start: 2024-11-09 | End: 2024-11-12

## 2024-11-09 RX ORDER — LEVOTHYROXINE SODIUM 88 MCG
50 TABLET ORAL DAILY
Refills: 0 | Status: DISCONTINUED | OUTPATIENT
Start: 2024-11-09 | End: 2024-11-12

## 2024-11-09 RX ORDER — TESTOSTERONE CYPIONATE 200 MG/ML
50 INJECTION, SOLUTION INTRAMUSCULAR DAILY
Refills: 0 | Status: DISCONTINUED | OUTPATIENT
Start: 2024-11-09 | End: 2024-11-12

## 2024-11-09 RX ORDER — ENOXAPARIN SODIUM 40MG/0.4ML
40 SYRINGE (ML) SUBCUTANEOUS EVERY 24 HOURS
Refills: 0 | Status: DISCONTINUED | OUTPATIENT
Start: 2024-11-09 | End: 2024-11-12

## 2024-11-09 RX ORDER — CETIRIZINE HCL 10 MG/1
10 TABLET ORAL DAILY
Refills: 0 | Status: DISCONTINUED | OUTPATIENT
Start: 2024-11-09 | End: 2024-11-12

## 2024-11-09 RX ORDER — GLUCAGON INJECTION, SOLUTION 1 MG/.2ML
1 INJECTION, SOLUTION SUBCUTANEOUS ONCE
Refills: 0 | Status: DISCONTINUED | OUTPATIENT
Start: 2024-11-09 | End: 2024-11-12

## 2024-11-09 RX ORDER — RISPERIDONE 3 MG/1
4 TABLET, FILM COATED ORAL
Refills: 0 | Status: DISCONTINUED | OUTPATIENT
Start: 2024-11-09 | End: 2024-11-12

## 2024-11-09 RX ORDER — INFLUENZ VIR VAC TV P-SURF2003 15MCG/.5ML
0.5 SYRINGE (ML) INTRAMUSCULAR ONCE
Refills: 0 | Status: DISCONTINUED | OUTPATIENT
Start: 2024-11-09 | End: 2024-11-12

## 2024-11-09 RX ORDER — PANTOPRAZOLE SODIUM 40 MG/1
20 TABLET, DELAYED RELEASE ORAL
Refills: 0 | Status: DISCONTINUED | OUTPATIENT
Start: 2024-11-09 | End: 2024-11-12

## 2024-11-09 RX ORDER — INSULIN LISPRO 100/ML
VIAL (ML) SUBCUTANEOUS
Refills: 0 | Status: DISCONTINUED | OUTPATIENT
Start: 2024-11-09 | End: 2024-11-12

## 2024-11-09 RX ORDER — SODIUM CHLORIDE 9 MG/ML
1000 INJECTION, SOLUTION INTRAMUSCULAR; INTRAVENOUS; SUBCUTANEOUS ONCE
Refills: 0 | Status: COMPLETED | OUTPATIENT
Start: 2024-11-09 | End: 2024-11-09

## 2024-11-09 RX ORDER — BUSPIRONE HYDROCHLORIDE 15 MG/1
15 TABLET ORAL
Refills: 0 | Status: DISCONTINUED | OUTPATIENT
Start: 2024-11-09 | End: 2024-11-12

## 2024-11-09 RX ADMIN — CLONIDINE HYDROCHLORIDE 0.3 MILLIGRAM(S): 0.2 TABLET ORAL at 17:25

## 2024-11-09 RX ADMIN — RISPERIDONE 4 MILLIGRAM(S): 3 TABLET, FILM COATED ORAL at 21:56

## 2024-11-09 RX ADMIN — Medication 50 MICROGRAM(S): at 07:26

## 2024-11-09 RX ADMIN — CLONIDINE HYDROCHLORIDE 0.3 MILLIGRAM(S): 0.2 TABLET ORAL at 07:26

## 2024-11-09 RX ADMIN — RISPERIDONE 4 MILLIGRAM(S): 3 TABLET, FILM COATED ORAL at 15:37

## 2024-11-09 RX ADMIN — BUSPIRONE HYDROCHLORIDE 15 MILLIGRAM(S): 15 TABLET ORAL at 21:39

## 2024-11-09 RX ADMIN — CETIRIZINE HCL 10 MILLIGRAM(S): 10 TABLET ORAL at 12:59

## 2024-11-09 RX ADMIN — Medication 1: at 12:58

## 2024-11-09 RX ADMIN — Medication 10 MILLIGRAM(S): at 21:40

## 2024-11-09 RX ADMIN — TESTOSTERONE CYPIONATE 50 MILLIGRAM(S): 200 INJECTION, SOLUTION INTRAMUSCULAR at 12:59

## 2024-11-09 RX ADMIN — MIRTAZAPINE 22.5 MILLIGRAM(S): 30 TABLET ORAL at 21:38

## 2024-11-09 RX ADMIN — Medication 5 MILLILITER(S): at 20:35

## 2024-11-09 RX ADMIN — PANTOPRAZOLE SODIUM 20 MILLIGRAM(S): 40 TABLET, DELAYED RELEASE ORAL at 07:27

## 2024-11-09 RX ADMIN — Medication 0.8 MILLIGRAM(S): at 21:39

## 2024-11-09 RX ADMIN — Medication 40 MILLIGRAM(S): at 07:26

## 2024-11-09 RX ADMIN — DIVALPROEX SODIUM 1000 MILLIGRAM(S): 250 TABLET, FILM COATED, EXTENDED RELEASE ORAL at 21:40

## 2024-11-09 RX ADMIN — DIVALPROEX SODIUM 500 MILLIGRAM(S): 250 TABLET, FILM COATED, EXTENDED RELEASE ORAL at 15:37

## 2024-11-09 RX ADMIN — SODIUM CHLORIDE 1000 MILLILITER(S): 9 INJECTION, SOLUTION INTRAMUSCULAR; INTRAVENOUS; SUBCUTANEOUS at 00:19

## 2024-11-09 NOTE — ED ADULT NURSE REASSESSMENT NOTE - NS ED NURSE REASSESS COMMENT FT1
pt states he is feeling an increase in lower abd pressure due to not being able to void. Pt bladder scan showed more than 550cc urine in bladder. Provider notified, verbal order to place mckeon and admit patient. Written orders to come
pt unable to void after 1 L fluid bolus
report received from previous RN. received patient resting in stretcher. mckeon in place draining yellow urine. denies any acute pain aside from some irritation from tube to penis. with bed on 3W report given. awaiting transport.

## 2024-11-09 NOTE — PROGRESS NOTE ADULT - TIME BILLING
pt seen and examine today see above plan . hx  Bipolar Disorder, Autism, ADHD, MR, Depression, behavioral disorder requiring previous inpatient psychiatric admissions and constant 1:1 observation - started  1;1 observation / pt have no current sucidal risk but very high risk per mother and gp staff. pt seen and examine today see above plan . hx  Bipolar Disorder, Autism, ADHD, MR, Depression, behavioral disorder requiring previous inpatient psychiatric admissions and constant 1:1 observation - started  1;1 observation / pt have no current suicidal risk but very high risk per mother and gp staff. pt seen and examine today see above plan . hx  Bipolar Disorder, Autism, ADHD, MR, Depression, behavioral disorder requiring previous inpatient psychiatric admissions and constant 1:1 observation - started  1;1 observation / pt have no current suicidal risk but very high risk per mother and gp staff. mother  update given.

## 2024-11-09 NOTE — H&P ADULT - ASSESSMENT
34M PMH of Bipolar Disorder, ADHD, MR, Hypothyroidism, Asthma, DM2, HTN, HLD, autism, behavioral disorder requiring previous inpatient psychiatric admissions and constant 1:1 observation, BPH with urinary retention, hx testicular cancer s/p b/l orchiectomy B[facility]S from [facility] resides at [facility] in [place] comes in  for urinary retention. Admitted for  34M PMH of Bipolar Disorder, ADHD, MR, Hypothyroidism, Asthma, DM2, HTN, HLD, autism, behavioral disorder requiring previous inpatient psychiatric admissions and constant 1:1 observation, BPH with urinary retention, hx testicular cancer s/p b/l orchiectomy B[facility]EMS from [facility] resides at [facility] in [place] comes in  for urinary retention. Admitted for urinary retention.  34M PMH of Bipolar Disorder, Autism, ADHD, MR, Depression, behavioral disorder requiring previous inpatient psychiatric admissions and constant 1:1 observation, Hypothyroidism, Asthma, DM2, HTN, HLD, BPH with urinary retention, hx testicular cancer s/p b/l orchiectomy B[facility]EMS from [facility] resides at [facility] in [place] comes in  for urinary retention.  Admitted for eval for frequent urinary retention.

## 2024-11-09 NOTE — H&P ADULT - PROBLEM SELECTOR PLAN 3
Continue home Depakote, Buspirone, Remeron, and Risperidone as scheduled Continue Depakote; Continue Risperdal for behavioral disorder, agitation

## 2024-11-09 NOTE — ED ADULT NURSE NOTE - NSFALLUNIVINTERV_ED_ALL_ED
Bed/Stretcher in lowest position, wheels locked, appropriate side rails in place/Call bell, personal items and telephone in reach/Instruct patient to call for assistance before getting out of bed/chair/stretcher/Non-slip footwear applied when patient is off stretcher/Pablo to call system/Physically safe environment - no spills, clutter or unnecessary equipment/Purposeful proactive rounding/Room/bathroom lighting operational, light cord in reach

## 2024-11-09 NOTE — PATIENT PROFILE ADULT - FUNCTIONAL ASSESSMENT - BASIC MOBILITY 6.
3-calculated by average/Not able to assess (calculate score using [facility] averaging method)

## 2024-11-09 NOTE — H&P ADULT - PROBLEM SELECTOR PLAN 1
Patient presenting with difficulty urinating since this morning, urinary retention with 550cc urine output on mckeon insertion  - UA unremarkable   - Will obtain CT A/P w/o IVC to again rule out structural issue  - CT A/P w/IVC 09/2024: no findings on imaging that can be associated to urinary retention, prostate was seen to be within normal limits in size   - Continue home tamsulosin 0.8mg QHS   - As this is the patient's third admission to [facility] within past two months, will obtain urology consult for eval Patient presenting with difficulty urinating since this morning, urinary retention with 550cc urine output on mckeon insertion  - UA unremarkable   - Will obtain CT A/P w/o IVC to again rule out structural issue  - CT A/P w/IVC 09/2024: no findings on imaging that can be associated to urinary retention, prostate was seen to be within normal limits in size   - Continue home tamsulosin 0.8mg QHS   - As this is the patient's third admission to [facility] within past two months, will obtain urology consult for eval  - Urology (Dr. Keita) consulted, f/u recs Patient presenting with difficulty urinating since this morning, urinary retention with 550cc urine output on mckeon insertion  - UA unremarkable   - Will obtain CT A/P w/o IVC to again rule out structural issue  - CT A/P w/IVC 09/2024: no findings on imaging that can be associated to urinary retention, prostate was seen to be within normal limits in size   - Continue home tamsulosin 0.8mg QHS   - As this is the patient's third admission to [facility] within past two months, will obtain urology consult for eval  - Urology (Dr. Delgado) consulted, f/u recs Patient presenting with difficulty urinating since this morning, urinary retention with 550cc urine output on mckeon insertion  - UA unremarkable   - Will obtain CT A/P w/o IVC to again rule out structural issue  - CT A/P w/IVC 09/2024: no findings on imaging that can be associated to urinary retention, prostate was seen to be within normal limits in size   - Continue home tamsulosin 0.8mg QHS   - As this is the patient's third admission to [facility] within past two months, to be consulted by team as routine consult.

## 2024-11-09 NOTE — ED PROVIDER NOTE - CLINICAL SUMMARY MEDICAL DECISION MAKING FREE TEXT BOX
Amy ATTG   34-year-old male cc  of urinary retention.  Patient has a history of BPH autism hyperlipidemia asthma type 2 diabetes.  Also has a history of factitious disorder patient also requires constant one-to-one observation.  Patient has a history of BPH and has had urinary retention in the past.  Patient also has a history of testicular cancer status post bilateral orchiectomy.  Last urination was approximately 2 hours per his history patient states that he cannot go back to the adult home with a catheter his last episode of this symptom was October 7.  Patient has tried to go to the bathroom with minimal relief bladder volume currently 170.  Per prior notes adult home supervisor KAYLIE at [phone] does not have the resources to take care of of the [place].    GENERAL: Awake, alert, NAD  LUNGS: non labored breathing   CARDIAC: RRR, no m/r/g  ABDOMEN: Soft, ,minimal tenderness to lower abd   EXT: No edema, no calf tenderness,, no deformities.  NEURO: A&Ox3. Moving all extremities.  SKIN: Warm and dry. No rash.  PSYCH: Normal affect.    Given history and physical exam we will give fluids check labs check urine if patient cannot urinate after fluids will admit patient to the hospitalist service as previously done October 7.

## 2024-11-09 NOTE — H&P ADULT - PROBLEM SELECTOR PLAN 6
Continue home clonidine Takes metformin at home  - Hold home medication  - Start LDISS  - POCT QAC QHS, Hypoglycemia protocol   - A1C 7.2 09/2024

## 2024-11-09 NOTE — CONSULT NOTE ADULT - SUBJECTIVE AND OBJECTIVE BOX
SURGERY PA CONSULT NOTE:    CHIEF COMPLAINT:  Patient is a 34y old  Male who presents with a chief complaint of urinary retention (09 Nov 2024 09:26)      HPI:  34M PMH of Bipolar Disorder, Autism, ADHD, MR, Depression, behavioral disorder requiring previous inpatient psychiatric admissions and constant 1:1 observation, Hypothyroidism, Asthma, DM2, HTN, HLD, BPH with urinary retention, hx testicular cancer s/p b/l orchiectomy [facility]EMS from [facility] resides at [facility] in [place] comes in  for urinary retention. Per patient, last urination was early morning of 11/8/24 with some associated pain and blood observed. Since then, pt has been unable to urinate even with straining. Mckeon was placed with 550cc urine output. Of note, pt had been here multiple times for same issue, mckeon was placed,  TOV was completed and discharged back to his group home; pt's group home does not have the resources to take care and manage the mckeon cath.   Patient has RUE scar from self-harm episode around 2 weeks prior. Currently does not have any thought of suicidal or homicidal thoughts.     INTERVAL HPI:  HPI as above. Patient examined at bedside, and spoke with mother over the phone, both state pt has had multiple episodes of urinary retention over the past few years. However, the group home where patient comes from does not have resources to manage mckeon catheter therefore patient usually has TOVs while admitted and discharged back to the group home. Patient states he recently had hematuria while having a UTI but that has since resolved. Currently patient reporting some lower left back pain but denies fever, chills, nausea, vomiting, or any other symptoms at this time.      PAST MEDICAL & SURGICAL HISTORY:  Intellectual disability    Obesity    Asthma    GERD (gastroesophageal reflux disease)    Factitious disorder    Urinary retention    Enuresis    Myopia of both eyes    Autism    Hypothyroid    History of BPH    HLD (hyperlipidemia)    Impulse control disorder    Type 2 diabetes mellitus    Testicular cancer    Autism    History of orchiectomy      REVIEW OF SYSTEMS:  General/Constitutional: No acute distress, no fevers, or chills   HEENT: Denies auditory or visual changes/disturbances, no vertigo, no throat pain, no dysphagia    Respiratory: Denies cough/hemoptysis, denies wheezing/SOB/dyspnea  Cardiac: Denies chest pain, palpitations  Abdomen: Denies abdominal bloating/fullness, nausea or vomiting, denies abdominal pain  Extremities: Denies sores, swelling, discoloration bilat UE/LE  Genitourinary: See HPI  Neuro: Denies weakness, paraesthesias, paralysis, syncope, loss of vision  Skin: Denies pruritus, pain, rashes  Psych: Denies hallucinations, visual disturbances, or depression    MEDICATIONS:  Home Medications:  atorvastatin 10 mg oral tablet: 1 tab(s) orally once a day (in the evening) (09 Nov 2024 04:17)  busPIRone 15 mg oral tablet: 1 tab(s) orally once a day (in the evening) (09 Nov 2024 04:17)  chlorhexidine 0.12% mucous membrane liquid: 15 milliliter(s) orally (09 Nov 2024 04:36)  cloNIDine 0.3 mg oral tablet: 1 tab(s) orally every 12 hours (09 Nov 2024 04:58)  divalproex sodium 500 mg oral tablet, extended release: 2 tab(s) orally once a day (at bedtime) (09 Nov 2024 04:17)  divalproex sodium 500 mg oral tablet, extended release: 1 tab(s) orally once a day (in the afternoon) at 2pm (09 Nov 2024 04:17)  loratadine 10 mg oral capsule: 1 cap(s) orally (09 Nov 2024 04:36)  mirtazapine 15 mg oral tablet: 1.5 tab(s) orally once a day (at bedtime) (09 Nov 2024 04:17)  Protonix 20 mg oral delayed release tablet: 1 tab(s) orally once a day (09 Nov 2024 04:17)  Remeron 15 mg oral tablet: 1 tab(s) orally (09 Nov 2024 04:36)  risperiDONE 4 mg oral tablet: 1 tab(s) orally 2 times a day at 2pm &amp; 8pm (09 Nov 2024 04:17)  senna (sennosides) 8.6 mg oral tablet: 2 tab(s) orally once a day (at bedtime) (09 Nov 2024 04:17)  testosterone 50 mg/5 g (1%) transdermal gel: 1 packet(s) transdermally once a day (09 Nov 2024 04:17)    MEDICATIONS  (STANDING):  atorvastatin 10 milliGRAM(s) Oral at bedtime  busPIRone 15 milliGRAM(s) Oral <User Schedule>  cetirizine 10 milliGRAM(s) Oral daily  cloNIDine 0.3 milliGRAM(s) Oral every 12 hours  dextrose 5%. 1000 milliLiter(s) (50 mL/Hr) IV Continuous <Continuous>  dextrose 5%. 1000 milliLiter(s) (100 mL/Hr) IV Continuous <Continuous>  dextrose 50% Injectable 12.5 Gram(s) IV Push once  dextrose 50% Injectable 25 Gram(s) IV Push once  dextrose 50% Injectable 25 Gram(s) IV Push once  divalproex  milliGRAM(s) Oral <User Schedule>  divalproex ER 1000 milliGRAM(s) Oral at bedtime  enoxaparin Injectable 40 milliGRAM(s) SubCutaneous every 24 hours  glucagon  Injectable 1 milliGRAM(s) IntraMuscular once  influenza   Vaccine 0.5 milliLiter(s) IntraMuscular once  insulin lispro (ADMELOG) corrective regimen sliding scale   SubCutaneous three times a day before meals  insulin lispro (ADMELOG) corrective regimen sliding scale   SubCutaneous at bedtime  levothyroxine 50 MICROGram(s) Oral daily  mirtazapine 22.5 milliGRAM(s) Oral at bedtime  pantoprazole    Tablet 20 milliGRAM(s) Oral before breakfast  risperiDONE   Tablet 4 milliGRAM(s) Oral <User Schedule>  senna 2 Tablet(s) Oral at bedtime  tamsulosin 0.8 milliGRAM(s) Oral at bedtime  testosterone 1% Gel 50 milliGRAM(s) Topical daily    MEDICATIONS  (PRN):  dextrose Oral Gel 15 Gram(s) Oral once PRN Blood Glucose LESS THAN 70 milliGRAM(s)/deciliter      ALLERGIES:  Allergies    Haldol (Rash)  Zyprexa (Dystonic RXN)  Bee stings (Unknown)  Zyprexa (Unknown)  Haldol (Other)  Celebrex (Short breath)    Intolerances        SOCIAL HISTORY:  Social History:  Lives: group home  ADLs: independent  Diet: no restrictions  Alcohol Use: denies  Tobacco Use: denies  Recreational Drug Use: denies (09 Nov 2024 04:23)    Smoking: Yes [ ]  No [ ]   ______pk yrs  ETOH  Yes [ ]  No [ ]  Social [ ]  DRUGS:  Yes [ ]  No [ ]  if so what______________    FAMILY HISTORY:  FAMILY HISTORY:  FH: diabetes mellitus (Mother)        VITAL SIGNS:  Vital Signs Last 24 Hrs  T(C): 36.5 (09 Nov 2024 08:26), Max: 36.7 (09 Nov 2024 03:53)  T(F): 97.7 (09 Nov 2024 08:26), Max: 98.1 (09 Nov 2024 03:53)  HR: 72 (09 Nov 2024 17:23) (71 - 85)  BP: 111/75 (09 Nov 2024 17:23) (111/75 - 131/90)  BP(mean): --  RR: 18 (09 Nov 2024 08:26) (16 - 18)  SpO2: 96% (09 Nov 2024 08:26) (96% - 98%)    Parameters below as of 09 Nov 2024 08:26  Patient On (Oxygen Delivery Method): room air        PHYSICAL EXAM:  General: No acute distress, appears comfortable, well-groomed, appears stated age  Head, Eyes, Ears, Nose, Throat: Normal cephalic/atraumatic, anicteric, conjunctiva-non injected and moist, vision grossly intact, hearing grossly intact, no nasal discharge, ears and nose symmetrical and atraumatic.  Nasal, oral, and oropharyngeal mucosa pink moist with no evidence of ulceration  Neck: Supple, carotids have good upstroke, trachea in the midline, without JVD or thyromegaly  Lymphatic: No evidence of masses or lymphadenopathy in the head, neck, trunk, axillary, inguinal, or supraclavicular regions  Chest: Lungs are clear to P&A, no wheezing, no rales, no ronchi, with good inspiratory effort  Heart: Heart rhythm regular, no murmurs  Abdomen: Soft, non tender, good bowel sounds present in all four quadrants.  No guarding, rebound, and no peritoneal signs.  No evidence of hepatosplenomegaly.  No evidence of abdominal wall hernias.  Inguinal regions are unremarkable with no evidence of hernias.   Extremity: No swelling, or open sores, no gross deformities,  good range of motion, 2+ peripheral pulses bilat UE/LE, no edema,  negative Darinel's sign, no lymphadenopathy  Neuro: Alert and oriented x3, motor and sensory intact  Psychiatric: Awake , alert, oriented x3 with an appropriate affect.   Skin: Good color, turgor, texture with no gross lesions, no eruptions, no rashes, no subcutaneous nodules and normal temperature.     LABS:                        12.9   8.27  )-----------( 216      ( 09 Nov 2024 07:33 )             40.3     11-09    141  |  108  |  9   ----------------------------<  127[H]  3.9   |  27  |  0.86    Ca    8.5      09 Nov 2024 07:33    TPro  6.4  /  Alb  3.2[L]  /  TBili  0.3  /  DBili  x   /  AST  18  /  ALT  41  /  AlkPhos  76  11-09      Urinalysis Basic - ( 09 Nov 2024 07:33 )    Color: x / Appearance: x / SG: x / pH: x  Gluc: 127 mg/dL / Ketone: x  / Bili: x / Urobili: x   Blood: x / Protein: x / Nitrite: x   Leuk Esterase: x / RBC: x / WBC x   Sq Epi: x / Non Sq Epi: x / Bacteria: x      LIVER FUNCTIONS - ( 09 Nov 2024 07:33 )  Alb: 3.2 g/dL / Pro: 6.4 g/dL / ALK PHOS: 76 U/L / ALT: 41 U/L / AST: 18 U/L / GGT: x             Urinalysis with Rflx Culture (collected 09 Nov 2024 04:15)        RADIOLOGY & ADDITIONAL STUDIES:    ASSESSMENT:    PLAN: SURGERY PA CONSULT NOTE:    CHIEF COMPLAINT:  Patient is a 34y old  Male who presents with a chief complaint of urinary retention (09 Nov 2024 09:26)      HPI:  34M PMH of Bipolar Disorder, Autism, ADHD, MR, Depression, behavioral disorder requiring previous inpatient psychiatric admissions and constant 1:1 observation, Hypothyroidism, Asthma, DM2, HTN, HLD, BPH with urinary retention, hx testicular cancer s/p b/l orchiectomy [facility]EMS from [facility] resides at [facility] in [place] comes in  for urinary retention. Per patient, last urination was early morning of 11/8/24 with some associated pain and blood observed. Since then, pt has been unable to urinate even with straining. Mckeon was placed with 550cc urine output. Of note, pt had been here multiple times for same issue, mckeon was placed,  TOV was completed and discharged back to his group home; pt's group home does not have the resources to take care and manage the mckeon cath.   Patient has RUE scar from self-harm episode around 2 weeks prior. Currently does not have any thought of suicidal or homicidal thoughts.     INTERVAL HPI:  HPI as above. Patient examined at bedside, and spoke with mother over the phone, both state pt has had multiple episodes of urinary retention over the past few years. However, the group home where patient comes from does not have resources to manage mckeon catheter therefore patient usually has TOVs while admitted and discharged back to the group home. Patient states he recently had hematuria while having a UTI but that has since resolved. Currently patient reporting some lower left back pain but denies fever, chills, nausea, vomiting, or any other symptoms at this time.      PAST MEDICAL & SURGICAL HISTORY:  Intellectual disability    Obesity    Asthma    GERD (gastroesophageal reflux disease)    Factitious disorder    Urinary retention    Enuresis    Myopia of both eyes    Autism    Hypothyroid    History of BPH    HLD (hyperlipidemia)    Impulse control disorder    Type 2 diabetes mellitus    Testicular cancer    Autism    History of orchiectomy      REVIEW OF SYSTEMS:  General/Constitutional: No acute distress, no fevers, or chills   HEENT: Denies auditory or visual changes/disturbances, no vertigo, no throat pain, no dysphagia    Respiratory: Denies cough/hemoptysis, denies wheezing/SOB/dyspnea  Cardiac: Denies chest pain, palpitations  Abdomen: Denies abdominal bloating/fullness, nausea or vomiting, denies abdominal pain  Extremities: Denies sores, swelling, discoloration bilat UE/LE  Genitourinary: See HPI  Neuro: Denies weakness, paraesthesias, paralysis, syncope, loss of vision  Skin: Denies pruritus, pain, rashes  Psych: Denies hallucinations, visual disturbances, or depression    MEDICATIONS:  Home Medications:  atorvastatin 10 mg oral tablet: 1 tab(s) orally once a day (in the evening) (09 Nov 2024 04:17)  busPIRone 15 mg oral tablet: 1 tab(s) orally once a day (in the evening) (09 Nov 2024 04:17)  chlorhexidine 0.12% mucous membrane liquid: 15 milliliter(s) orally (09 Nov 2024 04:36)  cloNIDine 0.3 mg oral tablet: 1 tab(s) orally every 12 hours (09 Nov 2024 04:58)  divalproex sodium 500 mg oral tablet, extended release: 2 tab(s) orally once a day (at bedtime) (09 Nov 2024 04:17)  divalproex sodium 500 mg oral tablet, extended release: 1 tab(s) orally once a day (in the afternoon) at 2pm (09 Nov 2024 04:17)  loratadine 10 mg oral capsule: 1 cap(s) orally (09 Nov 2024 04:36)  mirtazapine 15 mg oral tablet: 1.5 tab(s) orally once a day (at bedtime) (09 Nov 2024 04:17)  Protonix 20 mg oral delayed release tablet: 1 tab(s) orally once a day (09 Nov 2024 04:17)  Remeron 15 mg oral tablet: 1 tab(s) orally (09 Nov 2024 04:36)  risperiDONE 4 mg oral tablet: 1 tab(s) orally 2 times a day at 2pm &amp; 8pm (09 Nov 2024 04:17)  senna (sennosides) 8.6 mg oral tablet: 2 tab(s) orally once a day (at bedtime) (09 Nov 2024 04:17)  testosterone 50 mg/5 g (1%) transdermal gel: 1 packet(s) transdermally once a day (09 Nov 2024 04:17)    MEDICATIONS  (STANDING):  atorvastatin 10 milliGRAM(s) Oral at bedtime  busPIRone 15 milliGRAM(s) Oral <User Schedule>  cetirizine 10 milliGRAM(s) Oral daily  cloNIDine 0.3 milliGRAM(s) Oral every 12 hours  dextrose 5%. 1000 milliLiter(s) (50 mL/Hr) IV Continuous <Continuous>  dextrose 5%. 1000 milliLiter(s) (100 mL/Hr) IV Continuous <Continuous>  dextrose 50% Injectable 12.5 Gram(s) IV Push once  dextrose 50% Injectable 25 Gram(s) IV Push once  dextrose 50% Injectable 25 Gram(s) IV Push once  divalproex  milliGRAM(s) Oral <User Schedule>  divalproex ER 1000 milliGRAM(s) Oral at bedtime  enoxaparin Injectable 40 milliGRAM(s) SubCutaneous every 24 hours  glucagon  Injectable 1 milliGRAM(s) IntraMuscular once  influenza   Vaccine 0.5 milliLiter(s) IntraMuscular once  insulin lispro (ADMELOG) corrective regimen sliding scale   SubCutaneous three times a day before meals  insulin lispro (ADMELOG) corrective regimen sliding scale   SubCutaneous at bedtime  levothyroxine 50 MICROGram(s) Oral daily  mirtazapine 22.5 milliGRAM(s) Oral at bedtime  pantoprazole    Tablet 20 milliGRAM(s) Oral before breakfast  risperiDONE   Tablet 4 milliGRAM(s) Oral <User Schedule>  senna 2 Tablet(s) Oral at bedtime  tamsulosin 0.8 milliGRAM(s) Oral at bedtime  testosterone 1% Gel 50 milliGRAM(s) Topical daily    MEDICATIONS  (PRN):  dextrose Oral Gel 15 Gram(s) Oral once PRN Blood Glucose LESS THAN 70 milliGRAM(s)/deciliter      ALLERGIES:  Allergies    Haldol (Rash)  Zyprexa (Dystonic RXN)  Bee stings (Unknown)  Zyprexa (Unknown)  Haldol (Other)  Celebrex (Short breath)    Intolerances      SOCIAL HISTORY:  Social History:  Lives: group home  ADLs: independent  Diet: no restrictions  Alcohol Use: denies  Tobacco Use: denies  Recreational Drug Use: denies (09 Nov 2024 04:23)      FAMILY HISTORY:  FAMILY HISTORY:  FH: diabetes mellitus (Mother)      VITAL SIGNS:  Vital Signs Last 24 Hrs  T(C): 36.5 (09 Nov 2024 08:26), Max: 36.7 (09 Nov 2024 03:53)  T(F): 97.7 (09 Nov 2024 08:26), Max: 98.1 (09 Nov 2024 03:53)  HR: 72 (09 Nov 2024 17:23) (71 - 85)  BP: 111/75 (09 Nov 2024 17:23) (111/75 - 131/90)  RR: 18 (09 Nov 2024 08:26) (16 - 18)  SpO2: 96% (09 Nov 2024 08:26) (96% - 98%)    Parameters below as of 09 Nov 2024 08:26  Patient On (Oxygen Delivery Method): room air        PHYSICAL EXAM:  General: No acute distress, appears comfortable, well-groomed, appears stated age  HEENT: Normal cephalic/atraumatic, anicteric, conjunctiva-non injected and moist, vision grossly intact, hearing grossly intact, no nasal or ear discharge  Chest: Nonlabored breathing.  Abdomen: Soft, nontender  Back: R lower back tenderness  : mckeon in place w/ clear yellow urine in tubing and bag    LABS:                        12.9   8.27  )-----------( 216      ( 09 Nov 2024 07:33 )             40.3     11-09    141  |  108  |  9   ----------------------------<  127[H]  3.9   |  27  |  0.86    Ca    8.5      09 Nov 2024 07:33    TPro  6.4  /  Alb  3.2[L]  /  TBili  0.3  /  DBili  x   /  AST  18  /  ALT  41  /  AlkPhos  76  11-09      Urinalysis Basic - ( 09 Nov 2024 07:33 )    Color: x / Appearance: x / SG: x / pH: x  Gluc: 127 mg/dL / Ketone: x  / Bili: x / Urobili: x   Blood: x / Protein: x / Nitrite: x   Leuk Esterase: x / RBC: x / WBC x   Sq Epi: x / Non Sq Epi: x / Bacteria: x      LIVER FUNCTIONS - ( 09 Nov 2024 07:33 )  Alb: 3.2 g/dL / Pro: 6.4 g/dL / ALK PHOS: 76 U/L / ALT: 41 U/L / AST: 18 U/L / GGT: x             Urinalysis with Rflx Culture (collected 09 Nov 2024 04:15)

## 2024-11-09 NOTE — PROGRESS NOTE ADULT - SUBJECTIVE AND OBJECTIVE BOX
Patient is a 34y old  Male who presents with a chief complaint of urinary retention (09 Nov 2024 04:23)      INTERVAL HPI/OVERNIGHT EVENTS:     T(C): 36.5 (11-09-24 @ 08:26), Max: 36.7 (11-09-24 @ 03:53)  HR: 85 (11-09-24 @ 08:26) (71 - 85)  BP: 131/90 (11-09-24 @ 08:26) (127/84 - 131/90)  RR: 18 (11-09-24 @ 08:26) (16 - 18)  SpO2: 96% (11-09-24 @ 08:26) (96% - 98%)  Wt(kg): --  I&O's Summary      REVIEW OF SYSTEMS:  CONSTITUTIONAL: No fever, weight loss, or fatigue  EYES: No eye pain, visual disturbances, or discharge  ENMT:  No difficulty hearing, tinnitus, vertigo; No sinus or throat pain  NECK: No pain or stiffness  BREASTS: No pain, no masses  RESPIRATORY: No cough, wheezing, chills or hemoptysis; No shortness of breath  CARDIOVASCULAR: No chest pain, palpitations, dizziness, or leg swelling  GASTROINTESTINAL: No abdominal or epigastric pain. No nausea, vomiting, or hematemesis; No diarrhea or constipation. No melena or hematochezia.  GENITOURINARY: No dysuria, frequency, hematuria, or incontinence  NEUROLOGICAL: No headaches, memory loss, loss of strength, numbness, or tremors  SKIN: No itching, burning, rashes, or lesions   MUSCULOSKELETAL: No joint pain or swelling; No muscle, back, or extremity pain    PHYSICAL EXAM:  GENERAL: NAD, well-groomed, well-developed  HEAD:  Atraumatic, Normocephalic  EYES: EOMI, PERRLA, conjunctiva and sclera clear  ENMT: No tonsillar erythema, exudates, or enlargement; Moist mucous membranes  NECK: Supple, No JVD  NERVOUS SYSTEM:  Alert & Oriented X3; Motor Strength 5/5 B/L upper and lower extremities; DTRs 2+ intact and symmetric  CHEST/LUNG: Clear to percussion bilaterally; No rales, rhonchi, wheezing, or rubs  HEART: Regular rate and rhythm; No murmurs, rubs, or gallops  ABDOMEN: Soft, Nontender, Nondistended; Bowel sounds present  EXTREMITIES:  2+ Peripheral Pulses, No clubbing, cyanosis, or edema  SKIN: No rashes or lesions  gu mckeon   MEDICATIONS  (STANDING):  atorvastatin 10 milliGRAM(s) Oral at bedtime  busPIRone 15 milliGRAM(s) Oral <User Schedule>  cetirizine 10 milliGRAM(s) Oral daily  cloNIDine 0.3 milliGRAM(s) Oral every 12 hours  dextrose 5%. 1000 milliLiter(s) (50 mL/Hr) IV Continuous <Continuous>  dextrose 5%. 1000 milliLiter(s) (100 mL/Hr) IV Continuous <Continuous>  dextrose 50% Injectable 12.5 Gram(s) IV Push once  dextrose 50% Injectable 25 Gram(s) IV Push once  dextrose 50% Injectable 25 Gram(s) IV Push once  divalproex  milliGRAM(s) Oral <User Schedule>  divalproex ER 1000 milliGRAM(s) Oral at bedtime  enoxaparin Injectable 40 milliGRAM(s) SubCutaneous every 24 hours  glucagon  Injectable 1 milliGRAM(s) IntraMuscular once  influenza   Vaccine 0.5 milliLiter(s) IntraMuscular once  insulin lispro (ADMELOG) corrective regimen sliding scale   SubCutaneous three times a day before meals  insulin lispro (ADMELOG) corrective regimen sliding scale   SubCutaneous at bedtime  levothyroxine 50 MICROGram(s) Oral daily  mirtazapine 22.5 milliGRAM(s) Oral at bedtime  pantoprazole    Tablet 20 milliGRAM(s) Oral before breakfast  risperiDONE   Tablet 4 milliGRAM(s) Oral <User Schedule>  senna 2 Tablet(s) Oral at bedtime  tamsulosin 0.8 milliGRAM(s) Oral at bedtime  testosterone 1% Gel 50 milliGRAM(s) Topical daily    MEDICATIONS  (PRN):  dextrose Oral Gel 15 Gram(s) Oral once PRN Blood Glucose LESS THAN 70 milliGRAM(s)/deciliter      LABS:                        12.9   8.27  )-----------( 216      ( 09 Nov 2024 07:33 )             40.3     11-09    141  |  108  |  9   ----------------------------<  127[H]  3.9   |  27  |  0.86    Ca    8.5      09 Nov 2024 07:33    TPro  6.4  /  Alb  3.2[L]  /  TBili  0.3  /  DBili  x   /  AST  18  /  ALT  41  /  AlkPhos  76  11-09      Urinalysis Basic - ( 09 Nov 2024 07:33 )    Color: x / Appearance: x / SG: x / pH: x  Gluc: 127 mg/dL / Ketone: x  / Bili: x / Urobili: x   Blood: x / Protein: x / Nitrite: x   Leuk Esterase: x / RBC: x / WBC x   Sq Epi: x / Non Sq Epi: x / Bacteria: x      CAPILLARY BLOOD GLUCOSE      POCT Blood Glucose.: 122 mg/dL (09 Nov 2024 08:00)              RADIOLOGY & ADDITIONAL TESTS:    Imaging Personally Reviewed:       Advance Directives:      Palliative Care:  Appropriate     Patient is a 34y old  Male who presents with a chief complaint of urinary retention (09 Nov 2024 04:23)    pt seen and examine to day alert awake new mckeon /   + urine , no fever   INTERVAL HPI/OVERNIGHT EVENTS:     T(C): 36.5 (11-09-24 @ 08:26), Max: 36.7 (11-09-24 @ 03:53)  HR: 85 (11-09-24 @ 08:26) (71 - 85)  BP: 131/90 (11-09-24 @ 08:26) (127/84 - 131/90)  RR: 18 (11-09-24 @ 08:26) (16 - 18)  SpO2: 96% (11-09-24 @ 08:26) (96% - 98%)  Wt(kg): --  I&O's Summary      REVIEW OF SYSTEMS:  CONSTITUTIONAL: No fever, weight loss,   EYES: No eye pain, visual disturbances, or discharge  ENMT:  No difficulty hearing, tinnitus, vertigo; No sinus or throat pain  NECK: No pain or stiffness  RESPIRATORY: No cough, wheezing, chills or hemoptysis; No shortness of breath  CARDIOVASCULAR: No chest pain, palpitations, dizziness, or leg swelling  GASTROINTESTINAL: No abdominal or epigastric pain. No nausea, vomiting, or hematemesis; No diarrhea or constipation. No melena or hematochezia.  GENITOURINARY: No dysuria, frequency, hematuria, or incontinence  NEUROLOGICAL: No headaches, memory loss, loss of strength, numbness, or tremors  SKIN: No itching, burning, rashes, or lesions   MUSCULOSKELETAL: No joint pain or swelling; No muscle, back, or extremity pain    PHYSICAL EXAM:  GENERAL: NAD,   HEAD:  Atraumatic, Normocephalic  EYES: EOMI, PERRLA, conjunctiva and sclera clear  ENMT: No tonsillar erythema, exudates, or enlargement; Moist mucous membranes  NECK: Supple, No JVD  NERVOUS SYSTEM:  Alert & Oriented X3; Motor Strength 5/5 B/L upper and lower extremities; DTRs 2+ intact and symmetric  CHEST/LUNG:  percussion bilaterally; No rales, rhonchi, wheezing,  HEART: Regular rate and rhythm; No murmurs,  ABDOMEN: Soft, Nontender, Nondistended; Bowel sounds present  EXTREMITIES:  2+ Peripheral Pulses, No clubbing, cyanosis, or edema  SKIN: No rashes or lesions  gu mckeon   MEDICATIONS  (STANDING):  atorvastatin 10 milliGRAM(s) Oral at bedtime  busPIRone 15 milliGRAM(s) Oral <User Schedule>  cetirizine 10 milliGRAM(s) Oral daily  cloNIDine 0.3 milliGRAM(s) Oral every 12 hours  dextrose 5%. 1000 milliLiter(s) (50 mL/Hr) IV Continuous <Continuous>  dextrose 5%. 1000 milliLiter(s) (100 mL/Hr) IV Continuous <Continuous>  dextrose 50% Injectable 12.5 Gram(s) IV Push once  dextrose 50% Injectable 25 Gram(s) IV Push once  dextrose 50% Injectable 25 Gram(s) IV Push once  divalproex  milliGRAM(s) Oral <User Schedule>  divalproex ER 1000 milliGRAM(s) Oral at bedtime  enoxaparin Injectable 40 milliGRAM(s) SubCutaneous every 24 hours  glucagon  Injectable 1 milliGRAM(s) IntraMuscular once  influenza   Vaccine 0.5 milliLiter(s) IntraMuscular once  insulin lispro (ADMELOG) corrective regimen sliding scale   SubCutaneous three times a day before meals  insulin lispro (ADMELOG) corrective regimen sliding scale   SubCutaneous at bedtime  levothyroxine 50 MICROGram(s) Oral daily  mirtazapine 22.5 milliGRAM(s) Oral at bedtime  pantoprazole    Tablet 20 milliGRAM(s) Oral before breakfast  risperiDONE   Tablet 4 milliGRAM(s) Oral <User Schedule>  senna 2 Tablet(s) Oral at bedtime  tamsulosin 0.8 milliGRAM(s) Oral at bedtime  testosterone 1% Gel 50 milliGRAM(s) Topical daily    MEDICATIONS  (PRN):  dextrose Oral Gel 15 Gram(s) Oral once PRN Blood Glucose LESS THAN 70 milliGRAM(s)/deciliter      LABS:                        12.9   8.27  )-----------( 216      ( 09 Nov 2024 07:33 )             40.3     11-09    141  |  108  |  9   ----------------------------<  127[H]  3.9   |  27  |  0.86    Ca    8.5      09 Nov 2024 07:33    TPro  6.4  /  Alb  3.2[L]  /  TBili  0.3  /  DBili  x   /  AST  18  /  ALT  41  /  AlkPhos  76  11-09      Urinalysis Basic - ( 09 Nov 2024 07:33 )    Color: x / Appearance: x / SG: x / pH: x  Gluc: 127 mg/dL / Ketone: x  / Bili: x / Urobili: x   Blood: x / Protein: x / Nitrite: x   Leuk Esterase: x / RBC: x / WBC x   Sq Epi: x / Non Sq Epi: x / Bacteria: x      CAPILLARY BLOOD GLUCOSE      POCT Blood Glucose.: 122 mg/dL (09 Nov 2024 08:00)              RADIOLOGY & ADDITIONAL TESTS:    Imaging Personally Reviewed:       Advance Directives:      Palliative Care:  Appropriate     Patient is a 34y old  Male who presents with a chief complaint of urinary retention (09 Nov 2024 04:23)    pt seen and examine to day alert awake new mckeon /   + urine , no fever   INTERVAL HPI/OVERNIGHT EVENTS:     T(C): 36.5 (11-09-24 @ 08:26), Max: 36.7 (11-09-24 @ 03:53)  HR: 85 (11-09-24 @ 08:26) (71 - 85)  BP: 131/90 (11-09-24 @ 08:26) (127/84 - 131/90)  RR: 18 (11-09-24 @ 08:26) (16 - 18)  SpO2: 96% (11-09-24 @ 08:26) (96% - 98%)  Wt(kg): --  I&O's Summary      REVIEW OF SYSTEMS:  CONSTITUTIONAL: No fever, weight loss,   EYES: No eye pain, visual disturbances, or discharge  ENMT:  No difficulty hearing, tinnitus, vertigo; No sinus or throat pain  NECK: No pain or stiffness  RESPIRATORY: No cough, wheezing, chills or hemoptysis; No shortness of breath  CARDIOVASCULAR: No chest pain, palpitations, dizziness, or leg swelling  GASTROINTESTINAL: No abdominal or epigastric pain. No nausea, vomiting, or hematemesis; No diarrhea or constipation. No melena or hematochezia.  GENITOURINARY: No dysuria, frequency, hematuria, or incontinence  NEUROLOGICAL: No headaches, memory loss, loss of strength, numbness, or tremors  SKIN: No itching, burning, rashes, or lesions   MUSCULOSKELETAL: No joint pain or swelling; No muscle, back, or extremity pain    PHYSICAL EXAM:  GENERAL: NAD,   HEAD:  Atraumatic, Normocephalic  EYES: EOMI, PERRLA, conjunctiva and sclera clear  ENMT: No tonsillar erythema, exudates, or enlargement; Moist mucous membranes  NECK: Supple, No JVD  NERVOUS SYSTEM:  Alert & Oriented X3; Motor Strength 5/5 B/L upper and lower extremities; DTRs 2+ intact and symmetric  CHEST/LUNG:  percussion bilaterally; No rales, rhonchi, wheezing,  HEART: Regular rate and rhythm; No murmurs,  ABDOMEN: Soft, Nontender, Nondistended; Bowel sounds present  EXTREMITIES:  2+ Peripheral Pulses, No clubbing, cyanosis, or edema  SKIN: No rashes or lesions  gu mckeon   MEDICATIONS  (STANDING):  atorvastatin 10 milliGRAM(s) Oral at bedtime  busPIRone 15 milliGRAM(s) Oral <User Schedule>  cetirizine 10 milliGRAM(s) Oral daily  cloNIDine 0.3 milliGRAM(s) Oral every 12 hours  dextrose 5%. 1000 milliLiter(s) (50 mL/Hr) IV Continuous <Continuous>  dextrose 5%. 1000 milliLiter(s) (100 mL/Hr) IV Continuous <Continuous>  dextrose 50% Injectable 12.5 Gram(s) IV Push once  dextrose 50% Injectable 25 Gram(s) IV Push once  dextrose 50% Injectable 25 Gram(s) IV Push once  divalproex  milliGRAM(s) Oral <User Schedule>  divalproex ER 1000 milliGRAM(s) Oral at bedtime  enoxaparin Injectable 40 milliGRAM(s) SubCutaneous every 24 hours  glucagon  Injectable 1 milliGRAM(s) IntraMuscular once  influenza   Vaccine 0.5 milliLiter(s) IntraMuscular once  insulin lispro (ADMELOG) corrective regimen sliding scale   SubCutaneous three times a day before meals  insulin lispro (ADMELOG) corrective regimen sliding scale   SubCutaneous at bedtime  levothyroxine 50 MICROGram(s) Oral daily  mirtazapine 22.5 milliGRAM(s) Oral at bedtime  pantoprazole    Tablet 20 milliGRAM(s) Oral before breakfast  risperiDONE   Tablet 4 milliGRAM(s) Oral <User Schedule>  senna 2 Tablet(s) Oral at bedtime  tamsulosin 0.8 milliGRAM(s) Oral at bedtime  testosterone 1% Gel 50 milliGRAM(s) Topical daily    MEDICATIONS  (PRN):  dextrose Oral Gel 15 Gram(s) Oral once PRN Blood Glucose LESS THAN 70 milliGRAM(s)/deciliter      LABS:                        12.9   8.27  )-----------( 216      ( 09 Nov 2024 07:33 )             40.3     11-09    141  |  108  |  9   ----------------------------<  127[H]  3.9   |  27  |  0.86    Ca    8.5      09 Nov 2024 07:33    TPro  6.4  /  Alb  3.2[L]  /  TBili  0.3  /  DBili  x   /  AST  18  /  ALT  41  /  AlkPhos  76  11-09      Urinalysis Basic - ( 09 Nov 2024 07:33 )    Color: x / Appearance: x / SG: x / pH: x  Gluc: 127 mg/dL / Ketone: x  / Bili: x / Urobili: x   Blood: x / Protein: x / Nitrite: x   Leuk Esterase: x / RBC: x / WBC x   Sq Epi: x / Non Sq Epi: x / Bacteria: x      CAPILLARY BLOOD GLUCOSE      POCT Blood Glucose.: 122 mg/dL (09 Nov 2024 08:00)              RADIOLOGY & ADDITIONAL TESTS:    Imaging Personally Reviewed:     no new test

## 2024-11-09 NOTE — ED ADULT NURSE NOTE - OBJECTIVE STATEMENT
pt bibems for urinary retention states he is unable to go for past 5 hours, pt states he has had to be catheterized before for same issue. Pt states he is being treated for UTI currently with PO ABT and flomax. Pt able to tolerate bladder scan without issue, pain to lower abd is minimal

## 2024-11-09 NOTE — H&P ADULT - PROBLEM SELECTOR PLAN 4
Takes metformin at home  - Hold home medication  - Start LDISS  - POCT QAC QHS, Hypoglycemia protocol   - A1C 7.2 09/2024 Continue clonidine

## 2024-11-09 NOTE — ED PROVIDER NOTE - NSCAREINITIATED _GEN_ER
Prashanth Reyes(Attending) Soolantra Counseling: I discussed with the patients the risks of topial Soolantra. This is a medicine which decreases the number of mites and inflammation in the skin. You experience burning, stinging, eye irritation or allergic reactions.  Please call our office if you develop any problems from using this medication.

## 2024-11-09 NOTE — CONSULT NOTE ADULT - ASSESSMENT
34M PMH of Bipolar Disorder, Autism, ADHD, MR, Depression, behavioral disorder requiring previous inpatient psychiatric admissions and constant 1:1 observation, Hypothyroidism, Asthma, DM2, HTN, HLD, BPH with urinary retention, hx testicular cancer s/p b/l orchiectomy B[facility]EMS from [facility] resides at [facility] in [place] comes in  for urinary retention.  Admitted for eval for frequent urinary retention.    - Continue tamsulosin 0.8mg  - Consider TOV on 11/11  - Follow up with primary urologist Dr. Jack Paul outpatient  - Case discussed with Dr. Monge

## 2024-11-09 NOTE — ED ADULT NURSE NOTE - CHIEF COMPLAINT QUOTE
Patient brought by ambulance from [facility] as reported having urinary retention fro 5 hours now

## 2024-11-09 NOTE — H&P ADULT - NSHPREVIEWOFSYSTEMS_GEN_ALL_CORE
CONSTITUTIONAL: denies fever, chills, fatigue, weakness  HEENT: denies blurred vision, sore throat  SKIN: denies new lesions, rash  CARDIOVASCULAR: denies chest pain, chest pressure, palpitations  RESPIRATORY: denies shortness of breath, cough, sputum production  GASTROINTESTINAL: denies nausea, vomiting, diarrhea, abdominal pain, melena or hematochezia  GENITOURINARY: denies dysuria, discharge  NEUROLOGICAL: denies numbness, headache, focal weakness  MUSCULOSKELETAL: denies new joint pain, muscle aches  HEMATOLOGIC: denies gross bleeding, bruising CONSTITUTIONAL: denies fever, chills, fatigue, weakness  HEENT: denies blurred vision, sore throat  SKIN: denies new lesions, rash  CARDIOVASCULAR: denies chest pain, chest pressure, palpitations  RESPIRATORY: denies shortness of breath, cough, sputum production  GASTROINTESTINAL: denies nausea, vomiting, diarrhea, abdominal pain, melena or hematochezia  GENITOURINARY: + dysuria, urinary retention; denies discharge  NEUROLOGICAL: denies numbness, headache, focal weakness  MUSCULOSKELETAL: denies new joint pain, muscle aches  HEMATOLOGIC: denies gross bleeding, bruising

## 2024-11-09 NOTE — ED ADULT NURSE REASSESSMENT NOTE - NSFALLRISKINTERV_ED_ALL_ED
Assistance OOB with selected safe patient handling equipment if applicable/Assistance with ambulation/Communicate fall risk and risk factors to all staff, patient, and family/Monitor gait and stability/Provide patient with walking aids/Provide visual cue: yellow wristband, yellow gown, etc/Reinforce activity limits and safety measures with patient and family/Toileting schedule using arm’s reach rule for commode and bathroom/Call bell, personal items and telephone in reach/Instruct patient to call for assistance before getting out of bed/chair/stretcher/Non-slip footwear applied when patient is off stretcher/[place] to call system/Physically safe environment - no spills, clutter or unnecessary equipment/Purposeful Proactive Rounding/Room/bathroom lighting operational, light cord in reach

## 2024-11-09 NOTE — ED PROVIDER NOTE - PROGRESS NOTE DETAILS
Amy ATTMATTI  Given the fact that patient cannot go back to the group home with a Sarabia will try to avoid admission with trying to have the patient void while in the ED.  May ultimately require admission as previously noted. Case d/w Dr. Busby for admission Pt signed out to me by Dr. Reyes to evaluate for ability to spontaneously void  Pt unable to void. Bedside bladders can now shows 550cc retained urine  Will place mckeon. Pt however, cannot go back to group home with mckeon so will neeed to be admitted.

## 2024-11-09 NOTE — H&P ADULT - NSHPPHYSICALEXAM_GEN_ALL_CORE
T(C): 36.6 (11-08-24 @ 23:51), Max: 36.6 (11-08-24 @ 23:51)  HR: 81 (11-08-24 @ 23:51) (81 - 81)  BP: 131/88 (11-08-24 @ 23:51) (131/88 - 131/88)  RR: 16 (11-08-24 @ 23:51) (16 - 16)  SpO2: 98% (11-08-24 @ 23:51) (98% - 98%)    CONSTITUTIONAL: Well groomed, no apparent distress  EYES: PERRL and symmetric, EOMI  ENMT: Oral mucosa with moist membranes.  NECK: Supple, symmetric and without tracheal deviation   RESP: No respiratory distress, no use of accessory muscles; CTA b/l, no WRR  CV: RRR, +S1S2, no MRG;  no peripheral edema  GI: Soft, mild tenderness at suprapubic region; ND, no rebound, no guarding  LYMPH: No cervical LAD or tenderness  MSK: No digital clubbing or cyanosis; examination of the (head/neck/spine/ribs/pelvis, RUE, LUE, RLE, LLE) without misalignment,   SKIN:  Healing linear superficial lac to RUE  NEURO: CN II-XII intact; sensation intact in upper and lower extremities b/l to light touch   PSYCH: A+O x 3

## 2024-11-09 NOTE — PROGRESS NOTE ADULT - PROBLEM SELECTOR PLAN 1
Patient presenting with difficulty urinating since this morning, urinary retention with 550cc urine output on mckeon insertion  - UA unremarkable   - CT A/P w/IVC 09/2024: no findings on imaging that can be associated to urinary retention, prostate was seen to be within normal limits in size   - Continue home tamsulosin 0.8mg QHS   - As this is the patient's third admission to [facility]\"" within past two months, to be consulted by team as routine consult. Patient presenting with difficulty urinating since this morning, urinary retention with 550cc urine output on mckeon insertion  - UA unremarkable   - CT A/P w/IVC 09/2024: no findings on imaging that can be associated to urinary retention, prostate was seen to be within normal limits in size   - Continue home tamsulosin 0.8mg QHS   - As this is the patient's third admission to [facility] within past two months, to be consulted by team as routine consult.  urologist dr gaviota alvarez Patient presenting with difficulty urinating since this morning, urinary retention with 550cc urine output on mckeon insertion  - UA unremarkable   - CT A/P w/IVC 09/2024: no findings on imaging that can be associated to urinary retention, prostate was seen to be within normal limits in size   - Continue home tamsulosin 0.8mg QHS   - As this is the patient's third admission to [facility] within past two months, to be consulted by team as routine consult.  urologist dr javier alvarez  called on call dr burnett uro - surg pa received consult

## 2024-11-09 NOTE — PATIENT PROFILE ADULT - FALL HARM RISK - RISK INTERVENTIONS
Assistance OOB with selected safe patient handling equipment/Assistance with ambulation/Communicate Fall Risk and Risk Factors to all staff, patient, and family/Discuss with provider need for PT consult/Monitor gait and stability/Reinforce activity limits and safety measures with patient and family/Visual Cue: Yellow wristband/Bed in lowest position, wheels locked, appropriate side rails in place/Call bell, personal items and telephone in reach/Instruct patient to call for assistance before getting out of bed or chair/Non-slip footwear when patient is out of bed/[place] to call system/Physically safe environment - no spills, clutter or unnecessary equipment/Purposeful Proactive Rounding/Room/bathroom lighting operational, light cord in reach

## 2024-11-10 LAB
ANION GAP SERPL CALC-SCNC: 8 MMOL/L — SIGNIFICANT CHANGE UP (ref 5–17)
BASOPHILS # BLD AUTO: 0.03 K/UL — SIGNIFICANT CHANGE UP (ref 0–0.2)
BASOPHILS NFR BLD AUTO: 0.5 % — SIGNIFICANT CHANGE UP (ref 0–2)
BUN SERPL-MCNC: 10 MG/DL — SIGNIFICANT CHANGE UP (ref 7–23)
CALCIUM SERPL-MCNC: 8.9 MG/DL — SIGNIFICANT CHANGE UP (ref 8.5–10.1)
CHLORIDE SERPL-SCNC: 105 MMOL/L — SIGNIFICANT CHANGE UP (ref 96–108)
CO2 SERPL-SCNC: 27 MMOL/L — SIGNIFICANT CHANGE UP (ref 22–31)
CREAT SERPL-MCNC: 0.76 MG/DL — SIGNIFICANT CHANGE UP (ref 0.5–1.3)
EGFR: 120 ML/MIN/1.73M2 — SIGNIFICANT CHANGE UP
EOSINOPHIL # BLD AUTO: 0.35 K/UL — SIGNIFICANT CHANGE UP (ref 0–0.5)
EOSINOPHIL NFR BLD AUTO: 5.5 % — SIGNIFICANT CHANGE UP (ref 0–6)
GLUCOSE BLDC GLUCOMTR-MCNC: 112 MG/DL — HIGH (ref 70–99)
GLUCOSE BLDC GLUCOMTR-MCNC: 146 MG/DL — HIGH (ref 70–99)
GLUCOSE BLDC GLUCOMTR-MCNC: 173 MG/DL — HIGH (ref 70–99)
GLUCOSE BLDC GLUCOMTR-MCNC: 183 MG/DL — HIGH (ref 70–99)
GLUCOSE SERPL-MCNC: 145 MG/DL — HIGH (ref 70–99)
HCT VFR BLD CALC: 40.7 % — SIGNIFICANT CHANGE UP (ref 39–50)
HGB BLD-MCNC: 12.7 G/DL — LOW (ref 13–17)
IMM GRANULOCYTES NFR BLD AUTO: 0.8 % — SIGNIFICANT CHANGE UP (ref 0–0.9)
LYMPHOCYTES # BLD AUTO: 2.38 K/UL — SIGNIFICANT CHANGE UP (ref 1–3.3)
LYMPHOCYTES # BLD AUTO: 37.1 % — SIGNIFICANT CHANGE UP (ref 13–44)
MCHC RBC-ENTMCNC: 23.9 PG — LOW (ref 27–34)
MCHC RBC-ENTMCNC: 31.2 G/DL — LOW (ref 32–36)
MCV RBC AUTO: 76.5 FL — LOW (ref 80–100)
MONOCYTES # BLD AUTO: 0.49 K/UL — SIGNIFICANT CHANGE UP (ref 0–0.9)
MONOCYTES NFR BLD AUTO: 7.6 % — SIGNIFICANT CHANGE UP (ref 2–14)
NEUTROPHILS # BLD AUTO: 3.11 K/UL — SIGNIFICANT CHANGE UP (ref 1.8–7.4)
NEUTROPHILS NFR BLD AUTO: 48.5 % — SIGNIFICANT CHANGE UP (ref 43–77)
NRBC # BLD: 0 /100 WBCS — SIGNIFICANT CHANGE UP (ref 0–0)
PLATELET # BLD AUTO: 230 K/UL — SIGNIFICANT CHANGE UP (ref 150–400)
POTASSIUM SERPL-MCNC: 4.2 MMOL/L — SIGNIFICANT CHANGE UP (ref 3.5–5.3)
POTASSIUM SERPL-SCNC: 4.2 MMOL/L — SIGNIFICANT CHANGE UP (ref 3.5–5.3)
RBC # BLD: 5.32 M/UL — SIGNIFICANT CHANGE UP (ref 4.2–5.8)
RBC # FLD: 15 % — HIGH (ref 10.3–14.5)
SODIUM SERPL-SCNC: 140 MMOL/L — SIGNIFICANT CHANGE UP (ref 135–145)
WBC # BLD: 6.41 K/UL — SIGNIFICANT CHANGE UP (ref 3.8–10.5)
WBC # FLD AUTO: 6.41 K/UL — SIGNIFICANT CHANGE UP (ref 3.8–10.5)

## 2024-11-10 PROCEDURE — 99233 SBSQ HOSP IP/OBS HIGH 50: CPT | Mod: GC

## 2024-11-10 RX ORDER — ACETAMINOPHEN 500 MG
650 TABLET ORAL EVERY 6 HOURS
Refills: 0 | Status: DISCONTINUED | OUTPATIENT
Start: 2024-11-10 | End: 2024-11-12

## 2024-11-10 RX ADMIN — CETIRIZINE HCL 10 MILLIGRAM(S): 10 TABLET ORAL at 12:32

## 2024-11-10 RX ADMIN — Medication 1: at 17:21

## 2024-11-10 RX ADMIN — DIVALPROEX SODIUM 1000 MILLIGRAM(S): 250 TABLET, FILM COATED, EXTENDED RELEASE ORAL at 21:37

## 2024-11-10 RX ADMIN — Medication 50 MICROGRAM(S): at 09:44

## 2024-11-10 RX ADMIN — Medication 10 MILLIGRAM(S): at 21:37

## 2024-11-10 RX ADMIN — Medication 40 MILLIGRAM(S): at 09:44

## 2024-11-10 RX ADMIN — TESTOSTERONE CYPIONATE 50 MILLIGRAM(S): 200 INJECTION, SOLUTION INTRAMUSCULAR at 12:32

## 2024-11-10 RX ADMIN — Medication 0.8 MILLIGRAM(S): at 21:37

## 2024-11-10 RX ADMIN — DIVALPROEX SODIUM 500 MILLIGRAM(S): 250 TABLET, FILM COATED, EXTENDED RELEASE ORAL at 14:28

## 2024-11-10 RX ADMIN — BUSPIRONE HYDROCHLORIDE 15 MILLIGRAM(S): 15 TABLET ORAL at 21:36

## 2024-11-10 RX ADMIN — Medication 650 MILLIGRAM(S): at 12:32

## 2024-11-10 RX ADMIN — PANTOPRAZOLE SODIUM 20 MILLIGRAM(S): 40 TABLET, DELAYED RELEASE ORAL at 09:44

## 2024-11-10 RX ADMIN — RISPERIDONE 4 MILLIGRAM(S): 3 TABLET, FILM COATED ORAL at 14:28

## 2024-11-10 RX ADMIN — CLONIDINE HYDROCHLORIDE 0.3 MILLIGRAM(S): 0.2 TABLET ORAL at 17:30

## 2024-11-10 RX ADMIN — RISPERIDONE 4 MILLIGRAM(S): 3 TABLET, FILM COATED ORAL at 21:37

## 2024-11-10 RX ADMIN — MIRTAZAPINE 22.5 MILLIGRAM(S): 30 TABLET ORAL at 21:37

## 2024-11-10 NOTE — CHART NOTE - NSCHARTNOTEFT_GEN_A_CORE
Chart reviewed, no acute overnight events, no acute changes to status. +Sarabia catheter.    VSSAF  Labs unremarkable.    I&O's Summary    09 Nov 2024 07:01  -  10 Nov 2024 07:00  --------------------------------------------------------  IN: 0 mL / OUT: 1000 mL / NET: -1000 mL    10 Nov 2024 07:01  -  10 Nov 2024 20:15  --------------------------------------------------------  IN: 0 mL / OUT: 1700 mL / NET: -1700 mL    A/P: 34 yoM with PMHx Bipolar, Autism, ADHD, MR, Depression, prev inpt psych admissions, constant 1:1 observation, Hypothyroidism, Asthma, DM2, HTN, HLD, BPH with urinary retention, hx testicular cancer s/p b/l orchiectomy [facility] from [facility] resides at [facility] in [place] comes in  for urinary retention. Sees urologist Dr. Jack Paul outpatient.     - Sarabia placed 11/9, on flomax 0.8mg.   - Rec'd TOV tomorrow in the AM, and outpatient follow up.   - Continue care per primary

## 2024-11-10 NOTE — CARE COORDINATION ASSESSMENT. - NSCAREPROVIDERS_GEN_ALL_CORE_FT
CARE PROVIDERS:  Accepting Physician: Doug Busby  Access Services: ErranteZuri  Access Services: Curt, Macho  Administration: Ki Townsend  Admitting: Doug Busby  Attending: Ivy Dai  Consultant: Nolan Monge  Consultant: Danielle Contreras  Consultant: Dena Brandt  Covering Team: Geoffrey Weiss  ED Attending: Prashanth Reyes  ED Nurse: Milagros Foreman  Nurse: Yusra Mendoza  Oncology: Tracey Johnston  Ordered: Doctor, Unknown  Outpatient Provider: Marco Paul  Override: Josselyn Brothers  Override: Renee Mcdonnell  PCA/Nursing Assistant: Faye Perez  PCA/Nursing Assistant: Ngoc Sosa  Primary Team: Ana Bey  Primary Team: Patric Victoria  Registered Dietitian: Kristine Salazar  : Sisi Clark  : Harris Burt  Team: [facility], Team

## 2024-11-10 NOTE — CARE COORDINATION ASSESSMENT. - NSDCPLANSERVICES_GEN_ALL_CORE
Pt admitted for UTI and currently on IV abx. Pt is an 34 year old male [id number] is an OMRDD pt. Pt came from [facility] [address] [place] [address]  Sw has contacted pts [facility] today and spoke with KRISTY  [phone]. Pt is one of 2 residents at the [facility]. Group Charlo requires 24 hour notice and dc summary will need to be faxed. She was unable to provide fax number today. House RN is KAYLIE. Pts emergency contact is pts mother Liz Dunbar [phone]. Pt primarily wheelchair bound, and house assists with adls. Sw will continue to follow.

## 2024-11-10 NOTE — PROGRESS NOTE ADULT - TIME BILLING
pt seen and examine today see above plan . hx  Bipolar Disorder, Autism, ADHD, MR, Depression, behavioral disorder requiring previous inpatient psychiatric admissions and constant 1:1 observation - started  1;1 observation / pt have no current suicidal risk but very high risk per mother and gp staff. mother  update given. pt seen and examine today see above plan . hx  Bipolar Disorder, Autism, ADHD, MR, Depression, behavioral disorder requiring previous inpatient psychiatric admissions and constant 1:1 observation - started  1;1 observation / pt have no current suicidal risk but very high risk per mother and gp staff.  -     urinary retention - dc  mckeon in am/ voiding trial   at dc  Follow up with primary urologist Dr. Jack Paul outpatient .  pt mother aware plan .

## 2024-11-10 NOTE — PROGRESS NOTE ADULT - PROBLEM SELECTOR PLAN 3
Continue Depakote; Continue Risperdal for behavioral disorder, agitation Continue Depakote; Continue Risperdal for behavioral disorder, agitation - one 1 to 1 observation

## 2024-11-10 NOTE — PROGRESS NOTE ADULT - PROBLEM SELECTOR PLAN 1
Patient presenting with difficulty urinating since this morning, urinary retention with 550cc urine output on mckeon insertion  - UA unremarkable   - CT A/P w/IVC 09/2024: no findings on imaging that can be associated to urinary retention, prostate was seen to be within normal limits in size   - Continue home tamsulosin 0.8mg QHS   - As this is the patient's third admission to [facility] within past two months, to be consulted by team as routine consult.  urologist dr javier alvarez  called on call dr burnett uro - surg pa received consult Patient presenting with difficulty urinating since this morning, urinary retention with 550cc urine output on mckeon insertion  - UA unremarkable   - CT A/P w/IVC 09/2024: no findings on imaging that can be associated to urinary retention, prostate was seen to be within normal limits in size   - Continue home tamsulosin 0.8mg QHS   - As this is the patient's third admission to [facility] within past two months, urologist to be consulted  called on call dr hortencia carmen --   recommended  - voiding trial in am 11/11/24  -   follow up out pt primary urologist Dr. Jack Paul outpatient.

## 2024-11-10 NOTE — PROGRESS NOTE ADULT - SUBJECTIVE AND OBJECTIVE BOX
Patient is a 34y old  Male who presents with a chief complaint of urinary retention (09 Nov 2024 17:28)      INTERVAL HPI/OVERNIGHT EVENTS:     T(C): 36.4 (11-10-24 @ 04:48), Max: 36.6 (11-09-24 @ 20:10)  HR: 73 (11-10-24 @ 06:35) (67 - 73)  BP: 101/67 (11-10-24 @ 06:35) (98/62 - 115/74)  RR: 18 (11-10-24 @ 04:48) (18 - 18)  SpO2: 94% (11-10-24 @ 04:48) (94% - 96%)  Wt(kg): --  I&O's Summary    09 Nov 2024 07:01  -  10 Nov 2024 07:00  --------------------------------------------------------  IN: 0 mL / OUT: 1000 mL / NET: -1000 mL        REVIEW OF SYSTEMS:  CONSTITUTIONAL: No fever, weight loss, or fatigue  EYES: No eye pain, visual disturbances, or discharge  ENMT:  No difficulty hearing, tinnitus, vertigo; No sinus or throat pain  NECK: No pain or stiffness  BREASTS: No pain, no masses  RESPIRATORY: No cough, wheezing, chills or hemoptysis; No shortness of breath  CARDIOVASCULAR: No chest pain, palpitations, dizziness, or leg swelling  GASTROINTESTINAL: No abdominal or epigastric pain. No nausea, vomiting, or hematemesis; No diarrhea or constipation. No melena or hematochezia.  GENITOURINARY: No dysuria, frequency, hematuria, or incontinence  NEUROLOGICAL: No headaches, memory loss, loss of strength, numbness, or tremors  SKIN: No itching, burning, rashes, or lesions   MUSCULOSKELETAL: No joint pain or swelling; No muscle, back, or extremity pain    PHYSICAL EXAM:  GENERAL: NAD, well-groomed, well-developed  HEAD:  Atraumatic, Normocephalic  EYES: EOMI, PERRLA, conjunctiva and sclera clear  ENMT: No tonsillar erythema, exudates, or enlargement; Moist mucous membranes  NECK: Supple, No JVD  NERVOUS SYSTEM:  Alert & Oriented X3; Motor Strength 5/5 B/L upper and lower extremities; DTRs 2+ intact and symmetric  CHEST/LUNG: Clear to percussion bilaterally; No rales, rhonchi, wheezing, or rubs  HEART: Regular rate and rhythm; No murmurs, rubs, or gallops  ABDOMEN: Soft, Nontender, Nondistended; Bowel sounds present  EXTREMITIES:  2+ Peripheral Pulses, No clubbing, cyanosis, or edema  SKIN: No rashes or lesions    MEDICATIONS  (STANDING):  atorvastatin 10 milliGRAM(s) Oral at bedtime  busPIRone 15 milliGRAM(s) Oral <User Schedule>  cetirizine 10 milliGRAM(s) Oral daily  cloNIDine 0.3 milliGRAM(s) Oral every 12 hours  dextrose 5%. 1000 milliLiter(s) (50 mL/Hr) IV Continuous <Continuous>  dextrose 5%. 1000 milliLiter(s) (100 mL/Hr) IV Continuous <Continuous>  dextrose 50% Injectable 25 Gram(s) IV Push once  dextrose 50% Injectable 12.5 Gram(s) IV Push once  dextrose 50% Injectable 25 Gram(s) IV Push once  divalproex  milliGRAM(s) Oral <User Schedule>  divalproex ER 1000 milliGRAM(s) Oral at bedtime  enoxaparin Injectable 40 milliGRAM(s) SubCutaneous every 24 hours  glucagon  Injectable 1 milliGRAM(s) IntraMuscular once  influenza   Vaccine 0.5 milliLiter(s) IntraMuscular once  insulin lispro (ADMELOG) corrective regimen sliding scale   SubCutaneous three times a day before meals  insulin lispro (ADMELOG) corrective regimen sliding scale   SubCutaneous at bedtime  levothyroxine 50 MICROGram(s) Oral daily  mirtazapine 22.5 milliGRAM(s) Oral at bedtime  pantoprazole    Tablet 20 milliGRAM(s) Oral before breakfast  risperiDONE   Tablet 4 milliGRAM(s) Oral <User Schedule>  senna 2 Tablet(s) Oral at bedtime  tamsulosin 0.8 milliGRAM(s) Oral at bedtime  testosterone 1% Gel 50 milliGRAM(s) Topical daily    MEDICATIONS  (PRN):  dextrose Oral Gel 15 Gram(s) Oral once PRN Blood Glucose LESS THAN 70 milliGRAM(s)/deciliter      LABS:                        12.7   6.41  )-----------( 230      ( 10 Nov 2024 05:30 )             40.7     11-10    140  |  105  |  10  ----------------------------<  145[H]  4.2   |  27  |  0.76    Ca    8.9      10 Nov 2024 05:30    TPro  6.4  /  Alb  3.2[L]  /  TBili  0.3  /  DBili  x   /  AST  18  /  ALT  41  /  AlkPhos  76  11-09      Urinalysis Basic - ( 10 Nov 2024 05:30 )    Color: x / Appearance: x / SG: x / pH: x  Gluc: 145 mg/dL / Ketone: x  / Bili: x / Urobili: x   Blood: x / Protein: x / Nitrite: x   Leuk Esterase: x / RBC: x / WBC x   Sq Epi: x / Non Sq Epi: x / Bacteria: x      CAPILLARY BLOOD GLUCOSE      POCT Blood Glucose.: 115 mg/dL (09 Nov 2024 21:26)  POCT Blood Glucose.: 147 mg/dL (09 Nov 2024 17:29)  POCT Blood Glucose.: 156 mg/dL (09 Nov 2024 12:01)              RADIOLOGY & ADDITIONAL TESTS:    Imaging Personally Reviewed:       Advance Directives:      Palliative Care:  Appropriate     Patient is a 34y old  Male who presents with a chief complaint of urinary retention (09 Nov 2024 17:28)    pt seen and examine today  awake  would like to use  leg bag for [place] so  he can walk ,   no fever , tolerating po.   INTERVAL HPI/OVERNIGHT EVENTS:     T(C): 36.4 (11-10-24 @ 04:48), Max: 36.6 (11-09-24 @ 20:10)  HR: 73 (11-10-24 @ 06:35) (67 - 73)  BP: 101/67 (11-10-24 @ 06:35) (98/62 - 115/74)  RR: 18 (11-10-24 @ 04:48) (18 - 18)  SpO2: 94% (11-10-24 @ 04:48) (94% - 96%)  Wt(kg): --  I&O's Summary    09 Nov 2024 07:01  -  10 Nov 2024 07:00  --------------------------------------------------------  IN: 0 mL / OUT: 1000 mL / NET: -1000 mL        REVIEW OF SYSTEMS:  CONSTITUTIONAL: No fever, weight loss, or fatigue  EYES: No eye pain, visual disturbances, or discharge  ENMT:  No difficulty hearing, tinnitus, vertigo; No sinus or throat pain  NECK: No pain or stiffness  BREASTS: No pain, no masses  RESPIRATORY: No cough, wheezing, chills or hemoptysis; No shortness of breath  CARDIOVASCULAR: No chest pain, palpitations, dizziness, or leg swelling  GASTROINTESTINAL: No abdominal or epigastric pain. No nausea, vomiting, or hematemesis; No diarrhea or constipation. No melena or hematochezia.  GENITOURINARY: No dysuria, frequency, hematuria, or incontinence  NEUROLOGICAL: No headaches, memory loss, loss of strength, numbness, or tremors  SKIN: No itching, burning, rashes, or lesions   MUSCULOSKELETAL: No joint pain or swelling; No muscle, back, or extremity pain    PHYSICAL EXAM:  GENERAL: NAD, well-groomed, well-developed  HEAD:  Atraumatic, Normocephalic  EYES: EOMI, PERRLA, conjunctiva and sclera clear  ENMT: No tonsillar erythema, exudates, or enlargement; Moist mucous membranes  NECK: Supple, No JVD  NERVOUS SYSTEM:  Alert & Oriented X3; Motor Strength 5/5 B/L upper and lower extremities; DTRs 2+ intact and symmetric  CHEST/LUNG: Clear to percussion bilaterally; No rales, rhonchi, wheezing, or rubs  HEART: Regular rate and rhythm; No murmurs, rubs, or gallops  ABDOMEN: Soft, Nontender, Nondistended; Bowel sounds present  EXTREMITIES:  2+ Peripheral Pulses, No clubbing, cyanosis, or edema  SKIN: No rashes or lesions    MEDICATIONS  (STANDING):  atorvastatin 10 milliGRAM(s) Oral at bedtime  busPIRone 15 milliGRAM(s) Oral <User Schedule>  cetirizine 10 milliGRAM(s) Oral daily  cloNIDine 0.3 milliGRAM(s) Oral every 12 hours  dextrose 5%. 1000 milliLiter(s) (50 mL/Hr) IV Continuous <Continuous>  dextrose 5%. 1000 milliLiter(s) (100 mL/Hr) IV Continuous <Continuous>  dextrose 50% Injectable 25 Gram(s) IV Push once  dextrose 50% Injectable 12.5 Gram(s) IV Push once  dextrose 50% Injectable 25 Gram(s) IV Push once  divalproex  milliGRAM(s) Oral <User Schedule>  divalproex ER 1000 milliGRAM(s) Oral at bedtime  enoxaparin Injectable 40 milliGRAM(s) SubCutaneous every 24 hours  glucagon  Injectable 1 milliGRAM(s) IntraMuscular once  influenza   Vaccine 0.5 milliLiter(s) IntraMuscular once  insulin lispro (ADMELOG) corrective regimen sliding scale   SubCutaneous three times a day before meals  insulin lispro (ADMELOG) corrective regimen sliding scale   SubCutaneous at bedtime  levothyroxine 50 MICROGram(s) Oral daily  mirtazapine 22.5 milliGRAM(s) Oral at bedtime  pantoprazole    Tablet 20 milliGRAM(s) Oral before breakfast  risperiDONE   Tablet 4 milliGRAM(s) Oral <User Schedule>  senna 2 Tablet(s) Oral at bedtime  tamsulosin 0.8 milliGRAM(s) Oral at bedtime  testosterone 1% Gel 50 milliGRAM(s) Topical daily    MEDICATIONS  (PRN):  dextrose Oral Gel 15 Gram(s) Oral once PRN Blood Glucose LESS THAN 70 milliGRAM(s)/deciliter      LABS:                        12.7   6.41  )-----------( 230      ( 10 Nov 2024 05:30 )             40.7     11-10    140  |  105  |  10  ----------------------------<  145[H]  4.2   |  27  |  0.76    Ca    8.9      10 Nov 2024 05:30    TPro  6.4  /  Alb  3.2[L]  /  TBili  0.3  /  DBili  x   /  AST  18  /  ALT  41  /  AlkPhos  76  11-09      Urinalysis Basic - ( 10 Nov 2024 05:30 )    Color: x / Appearance: x / SG: x / pH: x  Gluc: 145 mg/dL / Ketone: x  / Bili: x / Urobili: x   Blood: x / Protein: x / Nitrite: x   Leuk Esterase: x / RBC: x / WBC x   Sq Epi: x / Non Sq Epi: x / Bacteria: x      CAPILLARY BLOOD GLUCOSE      POCT Blood Glucose.: 115 mg/dL (09 Nov 2024 21:26)  POCT Blood Glucose.: 147 mg/dL (09 Nov 2024 17:29)  POCT Blood Glucose.: 156 mg/dL (09 Nov 2024 12:01)              RADIOLOGY & ADDITIONAL TESTS:    Imaging Personally Reviewed:   no new test

## 2024-11-11 ENCOUNTER — TRANSCRIPTION ENCOUNTER (OUTPATIENT)
Age: 34
End: 2024-11-11

## 2024-11-11 LAB
ANION GAP SERPL CALC-SCNC: 8 MMOL/L — SIGNIFICANT CHANGE UP (ref 5–17)
BUN SERPL-MCNC: 15 MG/DL — SIGNIFICANT CHANGE UP (ref 7–23)
CALCIUM SERPL-MCNC: 9.2 MG/DL — SIGNIFICANT CHANGE UP (ref 8.5–10.1)
CHLORIDE SERPL-SCNC: 102 MMOL/L — SIGNIFICANT CHANGE UP (ref 96–108)
CO2 SERPL-SCNC: 29 MMOL/L — SIGNIFICANT CHANGE UP (ref 22–31)
CREAT SERPL-MCNC: 0.72 MG/DL — SIGNIFICANT CHANGE UP (ref 0.5–1.3)
EGFR: 122 ML/MIN/1.73M2 — SIGNIFICANT CHANGE UP
GLUCOSE BLDC GLUCOMTR-MCNC: 113 MG/DL — HIGH (ref 70–99)
GLUCOSE BLDC GLUCOMTR-MCNC: 119 MG/DL — HIGH (ref 70–99)
GLUCOSE BLDC GLUCOMTR-MCNC: 182 MG/DL — HIGH (ref 70–99)
GLUCOSE SERPL-MCNC: 141 MG/DL — HIGH (ref 70–99)
POTASSIUM SERPL-MCNC: 4.2 MMOL/L — SIGNIFICANT CHANGE UP (ref 3.5–5.3)
POTASSIUM SERPL-SCNC: 4.2 MMOL/L — SIGNIFICANT CHANGE UP (ref 3.5–5.3)
SODIUM SERPL-SCNC: 139 MMOL/L — SIGNIFICANT CHANGE UP (ref 135–145)

## 2024-11-11 PROCEDURE — 99233 SBSQ HOSP IP/OBS HIGH 50: CPT | Mod: GC

## 2024-11-11 RX ADMIN — Medication 40 MILLIGRAM(S): at 06:25

## 2024-11-11 RX ADMIN — DIVALPROEX SODIUM 500 MILLIGRAM(S): 250 TABLET, FILM COATED, EXTENDED RELEASE ORAL at 14:07

## 2024-11-11 RX ADMIN — PANTOPRAZOLE SODIUM 20 MILLIGRAM(S): 40 TABLET, DELAYED RELEASE ORAL at 06:24

## 2024-11-11 RX ADMIN — MIRTAZAPINE 22.5 MILLIGRAM(S): 30 TABLET ORAL at 21:46

## 2024-11-11 RX ADMIN — RISPERIDONE 4 MILLIGRAM(S): 3 TABLET, FILM COATED ORAL at 20:49

## 2024-11-11 RX ADMIN — BUSPIRONE HYDROCHLORIDE 15 MILLIGRAM(S): 15 TABLET ORAL at 20:49

## 2024-11-11 RX ADMIN — Medication 10 MILLIGRAM(S): at 21:47

## 2024-11-11 RX ADMIN — TESTOSTERONE CYPIONATE 50 MILLIGRAM(S): 200 INJECTION, SOLUTION INTRAMUSCULAR at 12:06

## 2024-11-11 RX ADMIN — Medication 0.8 MILLIGRAM(S): at 21:47

## 2024-11-11 RX ADMIN — CLONIDINE HYDROCHLORIDE 0.3 MILLIGRAM(S): 0.2 TABLET ORAL at 17:29

## 2024-11-11 RX ADMIN — DIVALPROEX SODIUM 1000 MILLIGRAM(S): 250 TABLET, FILM COATED, EXTENDED RELEASE ORAL at 21:46

## 2024-11-11 RX ADMIN — RISPERIDONE 4 MILLIGRAM(S): 3 TABLET, FILM COATED ORAL at 14:07

## 2024-11-11 RX ADMIN — CETIRIZINE HCL 10 MILLIGRAM(S): 10 TABLET ORAL at 12:07

## 2024-11-11 RX ADMIN — Medication 2 TABLET(S): at 21:47

## 2024-11-11 RX ADMIN — Medication 50 MICROGRAM(S): at 06:23

## 2024-11-11 NOTE — DISCHARGE NOTE PROVIDER - NSDCMRMEDTOKEN_GEN_ALL_CORE_FT
atorvastatin 10 mg oral tablet: 1 tab(s) orally once a day (in the evening)  busPIRone 15 mg oral tablet: 1 tab(s) orally once a day (in the evening)  chlorhexidine 0.12% mucous membrane liquid: 15 milliliter(s) orally  cloNIDine 0.3 mg oral tablet: 1 tab(s) orally every 12 hours  divalproex sodium 500 mg oral tablet, extended release: 2 tab(s) orally once a day (at bedtime)  divalproex sodium 500 mg oral tablet, extended release: 1 tab(s) orally once a day (in the afternoon) at 2pm  levothyroxine 50 mcg (0.05 mg) oral tablet: 1 tab(s) orally once a day  loratadine 10 mg oral capsule: 1 cap(s) orally  metFORMIN 1000 mg oral tablet: 1 tab(s) orally 2 times a day (with meals)  mirtazapine 15 mg oral tablet: 1.5 tab(s) orally once a day (at bedtime)  Protonix 20 mg oral delayed release tablet: 1 tab(s) orally once a day  Remeron 15 mg oral tablet: 1 tab(s) orally  risperiDONE 4 mg oral tablet: 1 tab(s) orally 2 times a day at 2pm &amp; 8pm  senna (sennosides) 8.6 mg oral tablet: 2 tab(s) orally once a day (at bedtime)  tamsulosin 0.4 mg oral capsule: 2 cap(s) orally once a day (at bedtime)  testosterone 50 mg/5 g (1%) transdermal gel: 1 packet(s) transdermally once a day

## 2024-11-11 NOTE — PROGRESS NOTE ADULT - PROBLEM SELECTOR PLAN 6
Takes metformin at home  - Hold home medication  - Start LDISS  - POCT QAC QHS, Hypoglycemia protocol   - A1C 7.2 09/2024

## 2024-11-11 NOTE — DISCHARGE NOTE PROVIDER - HOSPITAL COURSE
34M PMH of Bipolar Disorder, Autism, ADHD, MR, Depression, behavioral disorder requiring previous inpatient psychiatric admissions and constant 1:1 observation, Hypothyroidism, Asthma, DM2, HTN, HLD, BPH with urinary retention, hx testicular cancer s/p b/l orchiectomy B[facility]EMS from [facility] resides at [facility] in [place] comes in  for urinary retention.  Admitted for eval for frequent urinary retention.   Patient presenting with difficulty urinating since this morning, urinary retention with 550cc urine output on mckeon insertion  - UA unremarkable  CT A/P w/IVC 09/2024: no findings on imaging that can be associated to urinary retention, prostate was seen to be within normal limits in size   - Continue home tamsulosin 0.8mg QHS   - As this is the patient's third admission to [facility] within past two months, urologist to be consulted  called on call dr hortencia carmen --   recommended  - voiding trial   pt passed   -     Bipolar disorder.  Continue Depakote; Continue Risperdal for behavioral disorder, agitation - one 1 to 1 observation as pt also in gp home with  constant observation.  urologist clear  pt to be  discharge  follow up out pt primary urologist Dr. Jack Palu outpatient with in 1wk . pt mother aware. 34M PMH of Bipolar Disorder, Autism, ADHD, MR, Depression, behavioral disorder requiring previous inpatient psychiatric admissions and constant 1:1 observation, Hypothyroidism, Asthma, DM2, HTN, HLD, BPH with urinary retention, hx testicular cancer s/p b/l orchiectomy BIBEMS from [facility] resides at [facility] in [place] comes in  for urinary retention.  Admitted for eval for frequent urinary retention.   Patient presenting with difficulty urinating since this morning, urinary retention with 550cc urine output on mckeon insertion  - UA unremarkable  CT A/P w/IVC 09/2024: no findings on imaging that can be associated to urinary retention, prostate was seen to be within normal limits in size   - Continue home tamsulosin 0.8mg QHS   - As this is the patient's third admission to [facility] within past two months, urologist consulted  called on call dr hortencia carmen --   recommended  - voiding trial  after one day    pt passed  trial  will need further out pt work up   -     Bipolar disorder.  Continue Depakote; Continue Risperdal for behavioral disorder, agitation - one 1 to 1 observation as pt also in gp home with  constant observation.  urologist cleared   pt to be  discharge  follow up out.    day of discharge physical exam 11/12 /24   Vital Signs Last 24 Hrs  T(C): 36.4 (12 Nov 2024 05:42), Max: 36.9 (11 Nov 2024 12:16)  T(F): 97.5 (12 Nov 2024 05:42), Max: 98.4 (11 Nov 2024 12:16)  HR: 70 (12 Nov 2024 05:42) (70 - 86)  BP: 105/72 (12 Nov 2024 05:42) (105/69 - 120/88)  BP(mean): --  RR: 18 (12 Nov 2024 05:42) (18 - 18)  SpO2: 94% (12 Nov 2024 05:42) (93% - 98%)    Parameters below as of 12 Nov 2024 05:42  Patient On (Oxygen Delivery Method): room air  PHYSICAL EXAM:  GENERAL: NAD, well-groomed, well-developed  HEAD:  Atraumatic, Normocephalic  EYES: EOMI, PERRLA, conjunctiva and sclera clear  ENMT:   Moist mucous membranes  NECK: Supple, No JVD  NERVOUS SYSTEM:  Alert & Oriented X3; Motor Strength 5/5 B/L upper and lower extremities; DTRs 2+ intact and symmetric  CHEST/LUNG:  percussion bilaterally; No rales, rhonchi, wheezing,   HEART: Regular rate and rhythm; No murmurs,    ABDOMEN: Soft, Nontender, Nondistended; Bowel sounds present  EXTREMITIES:  2+ Peripheral Pulses, No clubbing, cyanosis, or edema  SKIN: No rashes or lesionsy  follow up  urologist Dr. Jack Paul outpatient with in 1wk . pt mother aware.

## 2024-11-11 NOTE — BH INPATIENT PSYCHIATRY ASSESSMENT NOTE - NSBHADMITIPOBS_PSY_A_CORE
11-11    139  |  97[L]  |  11  ----------------------------<  119[H]  3.5   |  28  |  1.1    Ca    8.5      11 Nov 2024 06:12  Mg     2.1     11-11    A1C with Estimated Average Glucose Result: 7.0 % (11-09-24 @ 06:43)   Routine observation

## 2024-11-11 NOTE — PROGRESS NOTE ADULT - TIME BILLING
pt seen and examine today see above plan . hx  Bipolar Disorder, Autism, ADHD, MR, Depression, behavioral disorder requiring previous inpatient psychiatric admissions and constant 1:1 observation - started  1;1 observation / pt have no current suicidal risk but very high risk per mother and gp staff.  -     urinary retention - dc  mckeon in am/ voiding trial   at dc  Follow up with primary urologist Dr. Jack Paul outpatient .  pt mother aware plan . pt seen and examine today see above plan . hx  Bipolar Disorder, Autism, ADHD, MR, Depression, behavioral disorder requiring previous inpatient psychiatric admissions and constant 1:1 observation - started  1;1 observation / pt have no current suicidal risk but very high risk per mother and gp staff.  -     urinary retention - dc  mckeon today / voiding trial    Follow up with primary urologist Dr. Jack Paul outpatient .  pt mother aware plan .

## 2024-11-11 NOTE — PROGRESS NOTE ADULT - PROBLEM SELECTOR PLAN 10
S/P B/L orchiectomy  - Continue home testosterone transdermal gel

## 2024-11-11 NOTE — DISCHARGE NOTE PROVIDER - NSDCFUSCHEDAPPT_GEN_ALL_CORE_FT
[facility]  MRI  [place]  Scheduled Appointment: 11/12/2024    [facility]  [facility] [address]  Scheduled Appointment: 01/13/2025    Gabi Watson  [facility]  [facility]  Scheduled Appointment: 01/13/2025

## 2024-11-11 NOTE — PROGRESS NOTE ADULT - ASSESSMENT
34M PMH of Bipolar Disorder, Autism, ADHD, MR, Depression, behavioral disorder requiring previous inpatient psychiatric admissions and constant 1:1 observation, Hypothyroidism, Asthma, DM2, HTN, HLD, BPH with urinary retention, hx testicular cancer s/p b/l orchiectomy B[facility]EMS from [facility] resides at [facility] in [place] comes in  for urinary retention.  Admitted for eval for frequent urinary retention. 
34M PMH of Bipolar Disorder, Autism, ADHD, MR, Depression, behavioral disorder requiring previous inpatient psychiatric admissions and constant 1:1 observation, Hypothyroidism, Asthma, DM2, HTN, HLD, BPH with urinary retention, hx testicular cancer s/p b/l orchiectomy BIBEMS from [facility] resides at [facility] in [place] comes in  for urinary retention.  Admitted for eval for frequent urinary retention. 
34M PMH of Bipolar Disorder, Autism, ADHD, MR, Depression, behavioral disorder requiring previous inpatient psychiatric admissions and constant 1:1 observation, Hypothyroidism, Asthma, DM2, HTN, HLD, BPH with urinary retention, hx testicular cancer s/p b/l orchiectomy BIBEMS from [facility] resides at [facility] in [place] comes in  for urinary retention.  Admitted for eval for frequent urinary retention.

## 2024-11-11 NOTE — PROGRESS NOTE ADULT - PROBLEM SELECTOR PLAN 1
Patient presenting with difficulty urinating since this morning, urinary retention with 550cc urine output on mckeon insertion  - UA unremarkable   - CT A/P w/IVC 09/2024: no findings on imaging that can be associated to urinary retention, prostate was seen to be within normal limits in size   - Continue home tamsulosin 0.8mg QHS   - As this is the patient's third admission to [facility] within past two months, urologist to be consulted  called on call dr hortencia carmen --   recommended  - voiding trial in am 11/11/24  -   follow up out pt primary urologist Dr. Jack Paul outpatient. Patient presenting with difficulty urinating since this morning, urinary retention with 550cc urine output on mckeon insertion  - UA unremarkable   - CT A/P w/IVC 09/2024: no findings on imaging that can be associated to urinary retention, prostate was seen to be within normal limits in size   - Continue home tamsulosin 0.8mg QHS   - As this is the patient's third admission to [facility] within past two months, urologist to be consulted  called on call dr hortencia carmen --   recommended  - voiding trial   today -   follow up out pt primary urologist Dr. Jack Paul outpatient.

## 2024-11-11 NOTE — PROGRESS NOTE ADULT - SUBJECTIVE AND OBJECTIVE BOX
Patient is a 34y old  Male who presents with a chief complaint of urinary retention (11 Nov 2024 08:25)      INTERVAL HPI/OVERNIGHT EVENTS:     T(C): 36.9 (11-11-24 @ 12:16), Max: 36.9 (11-11-24 @ 12:16)  HR: 75 (11-11-24 @ 12:16) (69 - 75)  BP: 105/69 (11-11-24 @ 12:16) (105/69 - 121/85)  RR: 18 (11-11-24 @ 12:16) (18 - 18)  SpO2: 98% (11-11-24 @ 12:16) (93% - 98%)  Wt(kg): --  I&O's Summary    10 Nov 2024 07:01  -  11 Nov 2024 07:00  --------------------------------------------------------  IN: 0 mL / OUT: 2925 mL / NET: -2925 mL    11 Nov 2024 07:01  -  11 Nov 2024 14:36  --------------------------------------------------------  IN: 0 mL / OUT: 400 mL / NET: -400 mL        REVIEW OF SYSTEMS:  CONSTITUTIONAL: No fever, weight loss, or fatigue  EYES: No eye pain, visual disturbances, or discharge  ENMT:  No difficulty hearing, tinnitus, vertigo; No sinus or throat pain  NECK: No pain or stiffness  BREASTS: No pain, no masses  RESPIRATORY: No cough, wheezing, chills or hemoptysis; No shortness of breath  CARDIOVASCULAR: No chest pain, palpitations, dizziness, or leg swelling  GASTROINTESTINAL: No abdominal or epigastric pain. No nausea, vomiting, or hematemesis; No diarrhea or constipation. No melena or hematochezia.  GENITOURINARY: No dysuria, frequency, hematuria, or incontinence  NEUROLOGICAL: No headaches, memory loss, loss of strength, numbness, or tremors  SKIN: No itching, burning, rashes, or lesions   MUSCULOSKELETAL: No joint pain or swelling; No muscle, back, or extremity pain    PHYSICAL EXAM:  GENERAL: NAD, well-groomed, well-developed  HEAD:  Atraumatic, Normocephalic  EYES: EOMI, PERRLA, conjunctiva and sclera clear  ENMT: No tonsillar erythema, exudates, or enlargement; Moist mucous membranes  NECK: Supple, No JVD  NERVOUS SYSTEM:  Alert & Oriented X3; Motor Strength 5/5 B/L upper and lower extremities; DTRs 2+ intact and symmetric  CHEST/LUNG: Clear to percussion bilaterally; No rales, rhonchi, wheezing, or rubs  HEART: Regular rate and rhythm; No murmurs, rubs, or gallops  ABDOMEN: Soft, Nontender, Nondistended; Bowel sounds present  EXTREMITIES:  2+ Peripheral Pulses, No clubbing, cyanosis, or edema  SKIN: No rashes or lesions    MEDICATIONS  (STANDING):  atorvastatin 10 milliGRAM(s) Oral at bedtime  busPIRone 15 milliGRAM(s) Oral <User Schedule>  cetirizine 10 milliGRAM(s) Oral daily  cloNIDine 0.3 milliGRAM(s) Oral every 12 hours  dextrose 5%. 1000 milliLiter(s) (50 mL/Hr) IV Continuous <Continuous>  dextrose 5%. 1000 milliLiter(s) (100 mL/Hr) IV Continuous <Continuous>  dextrose 50% Injectable 25 Gram(s) IV Push once  dextrose 50% Injectable 12.5 Gram(s) IV Push once  dextrose 50% Injectable 25 Gram(s) IV Push once  divalproex  milliGRAM(s) Oral <User Schedule>  divalproex ER 1000 milliGRAM(s) Oral at bedtime  enoxaparin Injectable 40 milliGRAM(s) SubCutaneous every 24 hours  glucagon  Injectable 1 milliGRAM(s) IntraMuscular once  influenza   Vaccine 0.5 milliLiter(s) IntraMuscular once  insulin lispro (ADMELOG) corrective regimen sliding scale   SubCutaneous three times a day before meals  insulin lispro (ADMELOG) corrective regimen sliding scale   SubCutaneous at bedtime  levothyroxine 50 MICROGram(s) Oral daily  mirtazapine 22.5 milliGRAM(s) Oral at bedtime  pantoprazole    Tablet 20 milliGRAM(s) Oral before breakfast  risperiDONE   Tablet 4 milliGRAM(s) Oral <User Schedule>  senna 2 Tablet(s) Oral at bedtime  tamsulosin 0.8 milliGRAM(s) Oral at bedtime  testosterone 1% Gel 50 milliGRAM(s) Topical daily    MEDICATIONS  (PRN):  acetaminophen     Tablet .. 650 milliGRAM(s) Oral every 6 hours PRN Temp greater or equal to 38C (100.4F), Mild Pain (1 - 3)  dextrose Oral Gel 15 Gram(s) Oral once PRN Blood Glucose LESS THAN 70 milliGRAM(s)/deciliter      LABS:                        12.7   6.41  )-----------( 230      ( 10 Nov 2024 05:30 )             40.7     11-11    139  |  102  |  15  ----------------------------<  141[H]  4.2   |  29  |  0.72    Ca    9.2      11 Nov 2024 07:40        Urinalysis Basic - ( 11 Nov 2024 07:40 )    Color: x / Appearance: x / SG: x / pH: x  Gluc: 141 mg/dL / Ketone: x  / Bili: x / Urobili: x   Blood: x / Protein: x / Nitrite: x   Leuk Esterase: x / RBC: x / WBC x   Sq Epi: x / Non Sq Epi: x / Bacteria: x      CAPILLARY BLOOD GLUCOSE      POCT Blood Glucose.: 182 mg/dL (11 Nov 2024 12:37)  POCT Blood Glucose.: 113 mg/dL (11 Nov 2024 09:18)  POCT Blood Glucose.: 183 mg/dL (10 Nov 2024 22:43)  POCT Blood Glucose.: 173 mg/dL (10 Nov 2024 17:03)              RADIOLOGY & ADDITIONAL TESTS:    Imaging Personally Reviewed:       Advance Directives:      Palliative Care:  Appropriate     Patient is a 34y old  Male who presents with a chief complaint of urinary retention (11 Nov 2024 08:25)    pt seen and examine today awake  no c/o  dc mckeon in am  now voiding .   INTERVAL HPI/OVERNIGHT EVENTS:     T(C): 36.9 (11-11-24 @ 12:16), Max: 36.9 (11-11-24 @ 12:16)  HR: 75 (11-11-24 @ 12:16) (69 - 75)  BP: 105/69 (11-11-24 @ 12:16) (105/69 - 121/85)  RR: 18 (11-11-24 @ 12:16) (18 - 18)  SpO2: 98% (11-11-24 @ 12:16) (93% - 98%)  Wt(kg): --  I&O's Summary    10 Nov 2024 07:01  -  11 Nov 2024 07:00  --------------------------------------------------------  IN: 0 mL / OUT: 2925 mL / NET: -2925 mL    11 Nov 2024 07:01  -  11 Nov 2024 14:36  --------------------------------------------------------  IN: 0 mL / OUT: 400 mL / NET: -400 mL        REVIEW OF SYSTEMS:  CONSTITUTIONAL: No fever, weight loss, or fatigue  EYES: No eye pain, visual disturbances, or discharge  ENMT:  No difficulty hearing, tinnitus, vertigo; No sinus or throat pain  NECK: No pain or stiffness  RESPIRATORY: No cough, wheezing, chills or hemoptysis; No shortness of breath  CARDIOVASCULAR: No chest pain, palpitations, dizziness, or leg swelling  GASTROINTESTINAL: No abdominal or epigastric pain. No nausea, vomiting, or hematemesis; No diarrhea or constipation. No melena or hematochezia.  GENITOURINARY: No dysuria, frequency, hematuria, or incontinence  NEUROLOGICAL: No headaches, memory loss, loss of strength, numbness, or tremors  SKIN: No itching, burning, rashes, or lesions   MUSCULOSKELETAL: No joint pain or swelling; No muscle, back, or extremity pain    PHYSICAL EXAM:  GENERAL: NAD, well-groomed, well-developed  HEAD:  Atraumatic, Normocephalic  EYES: EOMI, PERRLA, conjunctiva and sclera clear  ENMT:   Moist mucous membranes  NECK: Supple, No JVD  NERVOUS SYSTEM:  Alert & Oriented X3; Motor Strength 5/5 B/L upper and lower extremities; DTRs 2+ intact and symmetric  CHEST/LUNG:  percussion bilaterally; No rales, rhonchi, wheezing,   HEART: Regular rate and rhythm; No murmurs,    ABDOMEN: Soft, Nontender, Nondistended; Bowel sounds present  EXTREMITIES:  2+ Peripheral Pulses, No clubbing, cyanosis, or edema  SKIN: No rashes or lesions      MEDICATIONS  (STANDING):  atorvastatin 10 milliGRAM(s) Oral at bedtime  busPIRone 15 milliGRAM(s) Oral <User Schedule>  cetirizine 10 milliGRAM(s) Oral daily  cloNIDine 0.3 milliGRAM(s) Oral every 12 hours  dextrose 5%. 1000 milliLiter(s) (50 mL/Hr) IV Continuous <Continuous>  dextrose 5%. 1000 milliLiter(s) (100 mL/Hr) IV Continuous <Continuous>  dextrose 50% Injectable 25 Gram(s) IV Push once  dextrose 50% Injectable 12.5 Gram(s) IV Push once  dextrose 50% Injectable 25 Gram(s) IV Push once  divalproex  milliGRAM(s) Oral <User Schedule>  divalproex ER 1000 milliGRAM(s) Oral at bedtime  enoxaparin Injectable 40 milliGRAM(s) SubCutaneous every 24 hours  glucagon  Injectable 1 milliGRAM(s) IntraMuscular once  influenza   Vaccine 0.5 milliLiter(s) IntraMuscular once  insulin lispro (ADMELOG) corrective regimen sliding scale   SubCutaneous three times a day before meals  insulin lispro (ADMELOG) corrective regimen sliding scale   SubCutaneous at bedtime  levothyroxine 50 MICROGram(s) Oral daily  mirtazapine 22.5 milliGRAM(s) Oral at bedtime  pantoprazole    Tablet 20 milliGRAM(s) Oral before breakfast  risperiDONE   Tablet 4 milliGRAM(s) Oral <User Schedule>  senna 2 Tablet(s) Oral at bedtime  tamsulosin 0.8 milliGRAM(s) Oral at bedtime  testosterone 1% Gel 50 milliGRAM(s) Topical daily    MEDICATIONS  (PRN):  acetaminophen     Tablet .. 650 milliGRAM(s) Oral every 6 hours PRN Temp greater or equal to 38C (100.4F), Mild Pain (1 - 3)  dextrose Oral Gel 15 Gram(s) Oral once PRN Blood Glucose LESS THAN 70 milliGRAM(s)/deciliter      LABS:                        12.7   6.41  )-----------( 230      ( 10 Nov 2024 05:30 )             40.7     11-11    139  |  102  |  15  ----------------------------<  141[H]  4.2   |  29  |  0.72    Ca    9.2      11 Nov 2024 07:40        Urinalysis Basic - ( 11 Nov 2024 07:40 )    Color: x / Appearance: x / SG: x / pH: x  Gluc: 141 mg/dL / Ketone: x  / Bili: x / Urobili: x   Blood: x / Protein: x / Nitrite: x   Leuk Esterase: x / RBC: x / WBC x   Sq Epi: x / Non Sq Epi: x / Bacteria: x      CAPILLARY BLOOD GLUCOSE      POCT Blood Glucose.: 182 mg/dL (11 Nov 2024 12:37)  POCT Blood Glucose.: 113 mg/dL (11 Nov 2024 09:18)  POCT Blood Glucose.: 183 mg/dL (10 Nov 2024 22:43)  POCT Blood Glucose.: 173 mg/dL (10 Nov 2024 17:03)              RADIOLOGY & ADDITIONAL TESTS:    Imaging Personally Reviewed:   no new test

## 2024-11-11 NOTE — SOCIAL WORK PROGRESS NOTE - NSSWPROGRESSNOTE_GEN_ALL_CORE
KEKE spoke with this pts  [facility]- plan for DC tomorrow, SW offered to fax dc clinicals but they report their fax is broken and to give to group home when they  pt in the morning. SW to follow.

## 2024-11-11 NOTE — PROGRESS NOTE ADULT - SUBJECTIVE AND OBJECTIVE BOX
UROLOGY PA PROGRESS NOTE     S: Patient seen and examined at bedside.  No acute overnight events.  Patient reports mild suprapubic pressure/tenderness. Mckeon catheter taken out at 6am for TOV, patient states he has not voided as of yet. Ambulating and tolerating diet. Patient denies any fever, chills.    MEDICATIONS:  acetaminophen     Tablet .. 650 milliGRAM(s) Oral every 6 hours PRN  atorvastatin 10 milliGRAM(s) Oral at bedtime  busPIRone 15 milliGRAM(s) Oral <User Schedule>  cetirizine 10 milliGRAM(s) Oral daily  cloNIDine 0.3 milliGRAM(s) Oral every 12 hours  dextrose 5%. 1000 milliLiter(s) IV Continuous <Continuous>  dextrose 5%. 1000 milliLiter(s) IV Continuous <Continuous>  dextrose 50% Injectable 12.5 Gram(s) IV Push once  dextrose 50% Injectable 25 Gram(s) IV Push once  dextrose 50% Injectable 25 Gram(s) IV Push once  dextrose Oral Gel 15 Gram(s) Oral once PRN  divalproex  milliGRAM(s) Oral <User Schedule>  divalproex ER 1000 milliGRAM(s) Oral at bedtime  enoxaparin Injectable 40 milliGRAM(s) SubCutaneous every 24 hours  glucagon  Injectable 1 milliGRAM(s) IntraMuscular once  influenza   Vaccine 0.5 milliLiter(s) IntraMuscular once  insulin lispro (ADMELOG) corrective regimen sliding scale   SubCutaneous three times a day before meals  insulin lispro (ADMELOG) corrective regimen sliding scale   SubCutaneous at bedtime  levothyroxine 50 MICROGram(s) Oral daily  mirtazapine 22.5 milliGRAM(s) Oral at bedtime  pantoprazole    Tablet 20 milliGRAM(s) Oral before breakfast  risperiDONE   Tablet 4 milliGRAM(s) Oral <User Schedule>  senna 2 Tablet(s) Oral at bedtime  tamsulosin 0.8 milliGRAM(s) Oral at bedtime  testosterone 1% Gel 50 milliGRAM(s) Topical daily      O:  Vital Signs Last 24 Hrs  T(C): 36.8 (11 Nov 2024 06:10), Max: 36.8 (11 Nov 2024 06:10)  T(F): 98.2 (11 Nov 2024 06:10), Max: 98.2 (11 Nov 2024 06:10)  HR: 71 (11 Nov 2024 06:10) (69 - 89)  BP: 106/74 (11 Nov 2024 06:10) (106/74 - 121/85)  BP(mean): --  RR: 18 (11 Nov 2024 06:10) (18 - 18)  SpO2: 93% (11 Nov 2024 06:10) (93% - 94%)    Parameters below as of 11 Nov 2024 06:10  Patient On (Oxygen Delivery Method): room air        PHYSICAL EXAM:  GENERAL: No acute distress, lying comfortably in bed  HEAD:  Atraumatic, Normocephalic  CHEST/LUNG: Non labored respirations, no accessory muscle use. CTAB; No wheezes, rales, or rhonchi  HEART: Regular rate and rhythm; No murmurs, rubs, or gallops  ABDOMEN: Soft, non-tender, non-distended; bowel sounds+, mild suprapubic pressure/tenderness upon palpation  EXT: calves non-tender b/l, no edema  NEUROLOGY: A&O x 3, no focal deficits    I&O SUMMARY:    11-10-24 @ 07:01  -  11-11-24 @ 07:00  --------------------------------------------------------  IN:  Total IN: 0 mL    OUT:    Indwelling Catheter - Urethral (mL): 2925 mL  Total OUT: 2925 mL    Total NET: -2925 mL          LABS:                        12.7   6.41  )-----------( 230      ( 10 Nov 2024 05:30 )             40.7     11-10    140  |  105  |  10  ----------------------------<  145[H]  4.2   |  27  |  0.76    Ca    8.9      10 Nov 2024 05:30                  RADIOLOGY:    A/P: 34 yoM with PMHx Bipolar, Autism, ADHD, MR, Depression, prev inpt psych admissions, constant 1:1 observation, Hypothyroidism, Asthma, DM2, HTN, HLD, BPH with urinary retention, hx testicular cancer s/p b/l orchiectomy BIBEMS from [facility] home resides at [facility] in [place] comes in  for urinary retention. Sees urologist Dr. Jack Paul outpatient.     - Mckeon placed 11/9, on flomax 0.8mg.   - TOV in AM, mckeon dc @6am, will f/u if voided  - Recommend outpatient f/u  - Continue care per primary.

## 2024-11-11 NOTE — DISCHARGE NOTE PROVIDER - NSDCCPCAREPLAN_GEN_ALL_CORE_FT
PRINCIPAL DISCHARGE DIAGNOSIS  Diagnosis: Urinary retention  Assessment and Plan of Treatment: you were placed  on mckeon cath seen by urologist dr canchola   - later removed foely - you  passed  voiding trial  .primary urologist Dr. Jack Paul outpatient with in 1wk .      SECONDARY DISCHARGE DIAGNOSES  Diagnosis: Hypothyroidism  Assessment and Plan of Treatment: continue home meds    Diagnosis: T2DM (type 2 diabetes mellitus)  Assessment and Plan of Treatment: continue home meds    Diagnosis: HLD (hyperlipidemia)  Assessment and Plan of Treatment: continue home meds    Diagnosis: BPH (benign prostatic hyperplasia)  Assessment and Plan of Treatment: continue home meds    Diagnosis: Bipolar disorder  Assessment and Plan of Treatment: continue home meds

## 2024-11-12 ENCOUNTER — TRANSCRIPTION ENCOUNTER (OUTPATIENT)
Age: 34
End: 2024-11-12

## 2024-11-12 ENCOUNTER — OUTPATIENT (OUTPATIENT)
Dept: OUTPATIENT SERVICES | Facility: HOSPITAL | Age: 34
LOS: 1 days | End: 2024-11-12

## 2024-11-12 ENCOUNTER — APPOINTMENT (OUTPATIENT)
Dept: MRI IMAGING | Facility: CLINIC | Age: 34
End: 2024-11-12

## 2024-11-12 VITALS
DIASTOLIC BLOOD PRESSURE: 72 MMHG | TEMPERATURE: 98 F | SYSTOLIC BLOOD PRESSURE: 105 MMHG | HEART RATE: 70 BPM | RESPIRATION RATE: 18 BRPM | OXYGEN SATURATION: 94 %

## 2024-11-12 DIAGNOSIS — H90.3 SENSORINEURAL HEARING LOSS, BILATERAL: ICD-10-CM

## 2024-11-12 LAB — GLUCOSE BLDC GLUCOMTR-MCNC: 109 MG/DL — HIGH (ref 70–99)

## 2024-11-12 PROCEDURE — 36415 COLL VENOUS BLD VENIPUNCTURE: CPT

## 2024-11-12 PROCEDURE — 85025 COMPLETE CBC W/AUTO DIFF WBC: CPT

## 2024-11-12 PROCEDURE — 93005 ELECTROCARDIOGRAM TRACING: CPT

## 2024-11-12 PROCEDURE — 80053 COMPREHEN METABOLIC PANEL: CPT

## 2024-11-12 PROCEDURE — 99285 EMERGENCY DEPT VISIT HI MDM: CPT

## 2024-11-12 PROCEDURE — 82962 GLUCOSE BLOOD TEST: CPT

## 2024-11-12 PROCEDURE — 81003 URINALYSIS AUTO W/O SCOPE: CPT

## 2024-11-12 PROCEDURE — 99239 HOSP IP/OBS DSCHRG MGMT >30: CPT | Mod: GC

## 2024-11-12 PROCEDURE — 74176 CT ABD & PELVIS W/O CONTRAST: CPT | Mod: MC

## 2024-11-12 PROCEDURE — 80048 BASIC METABOLIC PNL TOTAL CA: CPT

## 2024-11-12 RX ADMIN — PANTOPRAZOLE SODIUM 20 MILLIGRAM(S): 40 TABLET, DELAYED RELEASE ORAL at 06:15

## 2024-11-12 RX ADMIN — Medication 50 MICROGRAM(S): at 06:15

## 2024-11-12 NOTE — DISCHARGE NOTE NURSING/CASE MANAGEMENT/SOCIAL WORK - PATIENT PORTAL LINK FT
You can access the FollowMyHealth Patient Portal offered by [facility] by registering at the following website: [email or web address]. By joining ’s FollowMyHealth portal, you will also be able to view your health information using other applications (apps) compatible with our system.

## 2024-11-12 NOTE — SOCIAL WORK PROGRESS NOTE - NSSWPROGRESSNOTE_GEN_ALL_CORE
KEKE met with this pt at bedside and spoke with Luis at bedside who reports they are eager to have patient discharged this morning. DC ppwk provided to Luis at bedside. KEEK spoke with pt about +depression, he reports he has a therapist in the community and Luis confirmed. No other needs identified. KEKE remains available.

## 2024-11-12 NOTE — DISCHARGE NOTE NURSING/CASE MANAGEMENT/SOCIAL WORK - NSDCVIVACCINE_GEN_ALL_CORE_FT
Tdap; 20-Dec-2018 12:21; Lo Anaya (RN); [employer]; [id number] (Exp. Date: 02-Nov-2020); IntraMuscular; Deltoid Left.; 0.5 milliLiter(s); VIS (VIS Published: 09-May-2013, VIS Presented: 20-Dec-2018);   Tdap; 26-Mar-2019 18:59; Shavon Barrios (RN); [employer]; [id number] (Exp. Date: 26-Feb-2021); IntraMuscular; Deltoid Left.; 0.5 milliLiter(s); VIS (VIS Published: 09-May-2013, VIS Presented: 26-Mar-2019);   Tdap; 01-Dec-2021 09:35; Zamzam Coulter (RN); [employer]; [id number] (Exp. Date: 09-Sep-2023); IntraMuscular; Deltoid Left.; 0.5 milliLiter(s); VIS (VIS Published: 09-May-2013, VIS Presented: 01-Dec-2021);   Tdap; 21-Jul-2022 01:28; Rose Hancock (RN); [employer]; [id number] (Exp. Date: 14-Oct-2024); IntraMuscular; Deltoid Right.; 0.5 milliLiter(s); VIS (VIS Published: 09-May-2013, VIS Presented: 21-Jul-2022);   Tdap; 12-Jun-2023 21:01; Diann Paul (RN); [employer]; [id number] (Exp. Date: 08-Mar-2025); IntraMuscular; Deltoid Left.; 0.5 milliLiter(s); VIS (VIS Published: 09-May-2013, VIS Presented: 12-Jun-2023);   Tdap; 24-Jul-2023 23:01; Diann Paul (RN); [employer]; [id number] (Exp. Date: 18-Aug-2025); IntraMuscular; Deltoid Right.; 0.5 milliLiter(s); VIS (VIS Published: 09-May-2013, VIS Presented: 24-Jul-2023);   Tdap; 09-Jun-2024 22:20; Dyllan Arevalo (RN); [employer]; [id number] (Exp. Date: 21-Nov-2024); IntraMuscular; Deltoid Right.; 0.5 milliLiter(s); VIS (VIS Published: 09-May-2013, VIS Presented: 09-Jun-2024);   Tdap; 24-Sep-2024 18:28; Luba Crocker (RN); [employer]; [id number] (Exp. Date: 31-May-2026); IntraMuscular; Deltoid Left.; 0.5 milliLiter(s); VIS (VIS Published: 09-May-2013, VIS Presented: 24-Sep-2024);

## 2024-11-12 NOTE — DISCHARGE NOTE NURSING/CASE MANAGEMENT/SOCIAL WORK - FINANCIAL ASSISTANCE
[facility] provides services at a reduced cost to those who are determined to be eligible through [facility]’s financial assistance program. Information regarding [facility]’s financial assistance program can be found by going to [email or web address] or by calling [phone].

## 2024-11-12 NOTE — DISCHARGE NOTE NURSING/CASE MANAGEMENT/SOCIAL WORK - NSDPACMPNY_GEN_ALL_CORE
"Requested Prescriptions   Pending Prescriptions Disp Refills     SYNTHROID 112 MCG tablet 90 tablet 3     Sig: Take 1 tablet (112 mcg) by mouth daily       Thyroid Protocol Failed - 11/16/2020 11:20 AM        Failed - Recent (12 mo) or future (30 days) visit within the authorizing provider's specialty     Patient has had an office visit with the authorizing provider or a provider within the authorizing providers department within the previous 12 mos or has a future within next 30 days. See \"Patient Info\" tab in inbasket, or \"Choose Columns\" in Meds & Orders section of the refill encounter.              Failed - Normal TSH on file in past 12 months     Recent Labs   Lab Test 07/01/19  1100   TSH 1.81              Passed - Patient is 12 years or older        Passed - Medication is active on med list        Passed - No active pregnancy on record     If patient is pregnant or has had a positive pregnancy test, please check TSH.          Passed - No positive pregnancy test in past 12 months     If patient is pregnant or has had a positive pregnancy test, please check TSH.             Routing refill request to provider for review/approval because:  Patient needs to be seen because it has been more than 1 year since last office visit.  Last OV with prescribing provider was on 7/1/19 for a post partum visit.  Last TSH was done on 7/1/19.    Flor Churchill RN          " Caregiver

## 2024-11-17 ENCOUNTER — INPATIENT (INPATIENT)
Facility: HOSPITAL | Age: 34
LOS: 1 days | Discharge: NURSING, REHAB OR LONG-TERM CARE | DRG: 696 | End: 2024-11-19
Attending: HOSPITALIST | Admitting: STUDENT IN AN ORGANIZED HEALTH CARE EDUCATION/TRAINING PROGRAM
Payer: MEDICAID

## 2024-11-17 VITALS
OXYGEN SATURATION: 96 % | HEART RATE: 110 BPM | RESPIRATION RATE: 18 BRPM | DIASTOLIC BLOOD PRESSURE: 90 MMHG | SYSTOLIC BLOOD PRESSURE: 127 MMHG | HEIGHT: 69 IN | TEMPERATURE: 98 F | WEIGHT: 289.03 LBS

## 2024-11-17 DIAGNOSIS — Z90.79 ACQUIRED ABSENCE OF OTHER GENITAL ORGAN(S): Chronic | ICD-10-CM

## 2024-11-17 PROCEDURE — 99285 EMERGENCY DEPT VISIT HI MDM: CPT

## 2024-11-18 DIAGNOSIS — R33.9 RETENTION OF URINE, UNSPECIFIED: ICD-10-CM

## 2024-11-18 DIAGNOSIS — N40.0 BENIGN PROSTATIC HYPERPLASIA WITHOUT LOWER URINARY TRACT SYMPTOMS: ICD-10-CM

## 2024-11-18 DIAGNOSIS — E11.9 TYPE 2 DIABETES MELLITUS WITHOUT COMPLICATIONS: ICD-10-CM

## 2024-11-18 DIAGNOSIS — E78.5 HYPERLIPIDEMIA, UNSPECIFIED: ICD-10-CM

## 2024-11-18 DIAGNOSIS — F90.9 ATTENTION-DEFICIT HYPERACTIVITY DISORDER, UNSPECIFIED TYPE: ICD-10-CM

## 2024-11-18 DIAGNOSIS — Z29.9 ENCOUNTER FOR PROPHYLACTIC MEASURES, UNSPECIFIED: ICD-10-CM

## 2024-11-18 DIAGNOSIS — K21.9 GASTRO-ESOPHAGEAL REFLUX DISEASE WITHOUT ESOPHAGITIS: ICD-10-CM

## 2024-11-18 DIAGNOSIS — E03.9 HYPOTHYROIDISM, UNSPECIFIED: ICD-10-CM

## 2024-11-18 DIAGNOSIS — C62.90 MALIGNANT NEOPLASM OF UNSPECIFIED TESTIS, UNSPECIFIED WHETHER DESCENDED OR UNDESCENDED: ICD-10-CM

## 2024-11-18 DIAGNOSIS — F32.9 MAJOR DEPRESSIVE DISORDER, SINGLE EPISODE, UNSPECIFIED: ICD-10-CM

## 2024-11-18 LAB
ALBUMIN SERPL ELPH-MCNC: 2.9 G/DL — LOW (ref 3.3–5)
ALBUMIN SERPL ELPH-MCNC: 3 G/DL — LOW (ref 3.3–5)
ALP SERPL-CCNC: 76 U/L — SIGNIFICANT CHANGE UP (ref 40–120)
ALP SERPL-CCNC: 81 U/L — SIGNIFICANT CHANGE UP (ref 40–120)
ALT FLD-CCNC: 35 U/L — SIGNIFICANT CHANGE UP (ref 12–78)
ALT FLD-CCNC: 43 U/L — SIGNIFICANT CHANGE UP (ref 12–78)
ANION GAP SERPL CALC-SCNC: 4 MMOL/L — LOW (ref 5–17)
ANION GAP SERPL CALC-SCNC: 9 MMOL/L — SIGNIFICANT CHANGE UP (ref 5–17)
APPEARANCE UR: CLEAR — SIGNIFICANT CHANGE UP
AST SERPL-CCNC: 12 U/L — LOW (ref 15–37)
AST SERPL-CCNC: 60 U/L — HIGH (ref 15–37)
BASOPHILS # BLD AUTO: 0.03 K/UL — SIGNIFICANT CHANGE UP (ref 0–0.2)
BASOPHILS # BLD AUTO: 0.05 K/UL — SIGNIFICANT CHANGE UP (ref 0–0.2)
BASOPHILS NFR BLD AUTO: 0.4 % — SIGNIFICANT CHANGE UP (ref 0–2)
BASOPHILS NFR BLD AUTO: 0.6 % — SIGNIFICANT CHANGE UP (ref 0–2)
BILIRUB SERPL-MCNC: 0.2 MG/DL — SIGNIFICANT CHANGE UP (ref 0.2–1.2)
BILIRUB SERPL-MCNC: <0.1 MG/DL — LOW (ref 0.2–1.2)
BILIRUB UR-MCNC: NEGATIVE — SIGNIFICANT CHANGE UP
BUN SERPL-MCNC: 12 MG/DL — SIGNIFICANT CHANGE UP (ref 7–23)
BUN SERPL-MCNC: 15 MG/DL — SIGNIFICANT CHANGE UP (ref 7–23)
CALCIUM SERPL-MCNC: 8.6 MG/DL — SIGNIFICANT CHANGE UP (ref 8.5–10.1)
CALCIUM SERPL-MCNC: 8.9 MG/DL — SIGNIFICANT CHANGE UP (ref 8.5–10.1)
CHLORIDE SERPL-SCNC: 103 MMOL/L — SIGNIFICANT CHANGE UP (ref 96–108)
CHLORIDE SERPL-SCNC: 104 MMOL/L — SIGNIFICANT CHANGE UP (ref 96–108)
CHOLEST SERPL-MCNC: 132 MG/DL — SIGNIFICANT CHANGE UP
CO2 SERPL-SCNC: 25 MMOL/L — SIGNIFICANT CHANGE UP (ref 22–31)
CO2 SERPL-SCNC: 26 MMOL/L — SIGNIFICANT CHANGE UP (ref 22–31)
COLOR SPEC: YELLOW — SIGNIFICANT CHANGE UP
CREAT SERPL-MCNC: 0.87 MG/DL — SIGNIFICANT CHANGE UP (ref 0.5–1.3)
CREAT SERPL-MCNC: 1.1 MG/DL — SIGNIFICANT CHANGE UP (ref 0.5–1.3)
DIFF PNL FLD: NEGATIVE — SIGNIFICANT CHANGE UP
EGFR: 115 ML/MIN/1.73M2 — SIGNIFICANT CHANGE UP
EGFR: 90 ML/MIN/1.73M2 — SIGNIFICANT CHANGE UP
EOSINOPHIL # BLD AUTO: 0.27 K/UL — SIGNIFICANT CHANGE UP (ref 0–0.5)
EOSINOPHIL # BLD AUTO: 0.33 K/UL — SIGNIFICANT CHANGE UP (ref 0–0.5)
EOSINOPHIL NFR BLD AUTO: 3.2 % — SIGNIFICANT CHANGE UP (ref 0–6)
EOSINOPHIL NFR BLD AUTO: 4 % — SIGNIFICANT CHANGE UP (ref 0–6)
GLUCOSE SERPL-MCNC: 180 MG/DL — HIGH (ref 70–99)
GLUCOSE SERPL-MCNC: 184 MG/DL — HIGH (ref 70–99)
GLUCOSE UR QL: NEGATIVE MG/DL — SIGNIFICANT CHANGE UP
HCT VFR BLD CALC: 38.6 % — LOW (ref 39–50)
HCT VFR BLD CALC: 40.6 % — SIGNIFICANT CHANGE UP (ref 39–50)
HDLC SERPL-MCNC: 23 MG/DL — LOW
HGB BLD-MCNC: 12.2 G/DL — LOW (ref 13–17)
HGB BLD-MCNC: 13 G/DL — SIGNIFICANT CHANGE UP (ref 13–17)
IMM GRANULOCYTES NFR BLD AUTO: 0.9 % — SIGNIFICANT CHANGE UP (ref 0–0.9)
IMM GRANULOCYTES NFR BLD AUTO: 1 % — HIGH (ref 0–0.9)
KETONES UR-MCNC: 15 MG/DL
LEUKOCYTE ESTERASE UR-ACNC: NEGATIVE — SIGNIFICANT CHANGE UP
LIPID PNL WITH DIRECT LDL SERPL: 79 MG/DL — SIGNIFICANT CHANGE UP
LYMPHOCYTES # BLD AUTO: 3.34 K/UL — HIGH (ref 1–3.3)
LYMPHOCYTES # BLD AUTO: 3.37 K/UL — HIGH (ref 1–3.3)
LYMPHOCYTES # BLD AUTO: 39.6 % — SIGNIFICANT CHANGE UP (ref 13–44)
LYMPHOCYTES # BLD AUTO: 41.2 % — SIGNIFICANT CHANGE UP (ref 13–44)
MCHC RBC-ENTMCNC: 24.2 PG — LOW (ref 27–34)
MCHC RBC-ENTMCNC: 24.3 PG — LOW (ref 27–34)
MCHC RBC-ENTMCNC: 31.6 G/DL — LOW (ref 32–36)
MCHC RBC-ENTMCNC: 32 G/DL — SIGNIFICANT CHANGE UP (ref 32–36)
MCV RBC AUTO: 75.9 FL — LOW (ref 80–100)
MCV RBC AUTO: 76.6 FL — LOW (ref 80–100)
MONOCYTES # BLD AUTO: 0.58 K/UL — SIGNIFICANT CHANGE UP (ref 0–0.9)
MONOCYTES # BLD AUTO: 0.64 K/UL — SIGNIFICANT CHANGE UP (ref 0–0.9)
MONOCYTES NFR BLD AUTO: 7.1 % — SIGNIFICANT CHANGE UP (ref 2–14)
MONOCYTES NFR BLD AUTO: 7.6 % — SIGNIFICANT CHANGE UP (ref 2–14)
NEUTROPHILS # BLD AUTO: 3.78 K/UL — SIGNIFICANT CHANGE UP (ref 1.8–7.4)
NEUTROPHILS # BLD AUTO: 4.05 K/UL — SIGNIFICANT CHANGE UP (ref 1.8–7.4)
NEUTROPHILS NFR BLD AUTO: 46.3 % — SIGNIFICANT CHANGE UP (ref 43–77)
NEUTROPHILS NFR BLD AUTO: 48.1 % — SIGNIFICANT CHANGE UP (ref 43–77)
NITRITE UR-MCNC: NEGATIVE — SIGNIFICANT CHANGE UP
NON HDL CHOLESTEROL: 109 MG/DL — SIGNIFICANT CHANGE UP
NRBC # BLD: 0 /100 WBCS — SIGNIFICANT CHANGE UP (ref 0–0)
NRBC # BLD: 0 /100 WBCS — SIGNIFICANT CHANGE UP (ref 0–0)
PH UR: 5 — SIGNIFICANT CHANGE UP (ref 5–8)
PLATELET # BLD AUTO: 211 K/UL — SIGNIFICANT CHANGE UP (ref 150–400)
PLATELET # BLD AUTO: 251 K/UL — SIGNIFICANT CHANGE UP (ref 150–400)
POTASSIUM SERPL-MCNC: 4.1 MMOL/L — SIGNIFICANT CHANGE UP (ref 3.5–5.3)
POTASSIUM SERPL-MCNC: SIGNIFICANT CHANGE UP MMOL/L (ref 3.5–5.3)
POTASSIUM SERPL-SCNC: 4.1 MMOL/L — SIGNIFICANT CHANGE UP (ref 3.5–5.3)
POTASSIUM SERPL-SCNC: SIGNIFICANT CHANGE UP MMOL/L (ref 3.5–5.3)
PROT SERPL-MCNC: 6 G/DL — SIGNIFICANT CHANGE UP (ref 6–8.3)
PROT SERPL-MCNC: 6.9 G/DL — SIGNIFICANT CHANGE UP (ref 6–8.3)
PROT UR-MCNC: NEGATIVE MG/DL — SIGNIFICANT CHANGE UP
RBC # BLD: 5.04 M/UL — SIGNIFICANT CHANGE UP (ref 4.2–5.8)
RBC # BLD: 5.35 M/UL — SIGNIFICANT CHANGE UP (ref 4.2–5.8)
RBC # FLD: 15.4 % — HIGH (ref 10.3–14.5)
RBC # FLD: 15.6 % — HIGH (ref 10.3–14.5)
SODIUM SERPL-SCNC: 133 MMOL/L — LOW (ref 135–145)
SODIUM SERPL-SCNC: 138 MMOL/L — SIGNIFICANT CHANGE UP (ref 135–145)
SP GR SPEC: 1.02 — SIGNIFICANT CHANGE UP (ref 1–1.03)
TRIGL SERPL-MCNC: 172 MG/DL — HIGH
UROBILINOGEN FLD QL: 1 MG/DL — SIGNIFICANT CHANGE UP (ref 0.2–1)
WBC # BLD: 8.17 K/UL — SIGNIFICANT CHANGE UP (ref 3.8–10.5)
WBC # BLD: 8.43 K/UL — SIGNIFICANT CHANGE UP (ref 3.8–10.5)
WBC # FLD AUTO: 8.17 K/UL — SIGNIFICANT CHANGE UP (ref 3.8–10.5)
WBC # FLD AUTO: 8.43 K/UL — SIGNIFICANT CHANGE UP (ref 3.8–10.5)

## 2024-11-18 PROCEDURE — 99223 1ST HOSP IP/OBS HIGH 75: CPT | Mod: GC

## 2024-11-18 PROCEDURE — 99253 IP/OBS CNSLTJ NEW/EST LOW 45: CPT

## 2024-11-18 RX ORDER — LEVOTHYROXINE SODIUM 150 MCG
50 TABLET ORAL DAILY
Refills: 0 | Status: DISCONTINUED | OUTPATIENT
Start: 2024-11-18 | End: 2024-11-19

## 2024-11-18 RX ORDER — ACETAMINOPHEN, DIPHENHYDRAMINE HCL, PHENYLEPHRINE HCL 325; 25; 5 MG/1; MG/1; MG/1
3 TABLET ORAL AT BEDTIME
Refills: 0 | Status: DISCONTINUED | OUTPATIENT
Start: 2024-11-18 | End: 2024-11-19

## 2024-11-18 RX ORDER — MAGNESIUM, ALUMINUM HYDROXIDE 200-225/5
30 SUSPENSION, ORAL (FINAL DOSE FORM) ORAL EVERY 4 HOURS
Refills: 0 | Status: DISCONTINUED | OUTPATIENT
Start: 2024-11-18 | End: 2024-11-19

## 2024-11-18 RX ORDER — SODIUM CHLORIDE 9 MG/ML
1000 INJECTION, SOLUTION INTRAMUSCULAR; INTRAVENOUS; SUBCUTANEOUS ONCE
Refills: 0 | Status: COMPLETED | OUTPATIENT
Start: 2024-11-18 | End: 2024-11-18

## 2024-11-18 RX ORDER — MIRTAZAPINE 15 MG/1
22.5 TABLET, FILM COATED ORAL AT BEDTIME
Refills: 0 | Status: DISCONTINUED | OUTPATIENT
Start: 2024-11-18 | End: 2024-11-19

## 2024-11-18 RX ORDER — RISPERIDONE 4 MG/1
4 TABLET ORAL
Refills: 0 | Status: DISCONTINUED | OUTPATIENT
Start: 2024-11-18 | End: 2024-11-19

## 2024-11-18 RX ORDER — DIVALPROEX SODIUM 500 MG
500 TABLET, DELAYED RELEASE (ENTERIC COATED) ORAL DAILY
Refills: 0 | Status: DISCONTINUED | OUTPATIENT
Start: 2024-11-18 | End: 2024-11-19

## 2024-11-18 RX ORDER — SENNOSIDES 8.6 MG
2 TABLET ORAL AT BEDTIME
Refills: 0 | Status: DISCONTINUED | OUTPATIENT
Start: 2024-11-18 | End: 2024-11-19

## 2024-11-18 RX ORDER — TESTOSTERONE 16.2 MG/G
50 GEL TRANSDERMAL DAILY
Refills: 0 | Status: DISCONTINUED | OUTPATIENT
Start: 2024-11-18 | End: 2024-11-19

## 2024-11-18 RX ORDER — ACETAMINOPHEN 500MG 500 MG/1
650 TABLET, COATED ORAL EVERY 6 HOURS
Refills: 0 | Status: DISCONTINUED | OUTPATIENT
Start: 2024-11-18 | End: 2024-11-19

## 2024-11-18 RX ORDER — DIVALPROEX SODIUM 500 MG
1000 TABLET, DELAYED RELEASE (ENTERIC COATED) ORAL AT BEDTIME
Refills: 0 | Status: DISCONTINUED | OUTPATIENT
Start: 2024-11-18 | End: 2024-11-19

## 2024-11-18 RX ORDER — TAMSULOSIN HYDROCHLORIDE 0.4 MG/1
0.8 CAPSULE ORAL AT BEDTIME
Refills: 0 | Status: DISCONTINUED | OUTPATIENT
Start: 2024-11-18 | End: 2024-11-19

## 2024-11-18 RX ORDER — 0.9 % SODIUM CHLORIDE 0.9 %
1000 INTRAVENOUS SOLUTION INTRAVENOUS
Refills: 0 | Status: DISCONTINUED | OUTPATIENT
Start: 2024-11-18 | End: 2024-11-19

## 2024-11-18 RX ORDER — CLONIDINE HYDROCHLORIDE 0.3 MG/1
0.3 TABLET ORAL EVERY 12 HOURS
Refills: 0 | Status: DISCONTINUED | OUTPATIENT
Start: 2024-11-18 | End: 2024-11-19

## 2024-11-18 RX ORDER — BUSPIRONE HCL 15 MG
15 TABLET ORAL
Refills: 0 | Status: DISCONTINUED | OUTPATIENT
Start: 2024-11-18 | End: 2024-11-19

## 2024-11-18 RX ORDER — MIRTAZAPINE 15 MG/1
15 TABLET, FILM COATED ORAL DAILY
Refills: 0 | Status: DISCONTINUED | OUTPATIENT
Start: 2024-11-18 | End: 2024-11-19

## 2024-11-18 RX ORDER — GLUCAGON INJECTION, SOLUTION 0.5 MG/.1ML
1 INJECTION, SOLUTION SUBCUTANEOUS ONCE
Refills: 0 | Status: DISCONTINUED | OUTPATIENT
Start: 2024-11-18 | End: 2024-11-19

## 2024-11-18 RX ORDER — INFLUENZA VIRUS VACCINE 15; 15; 15; 15 UG/.5ML; UG/.5ML; UG/.5ML; UG/.5ML
0.5 SUSPENSION INTRAMUSCULAR ONCE
Refills: 0 | Status: DISCONTINUED | OUTPATIENT
Start: 2024-11-18 | End: 2024-11-19

## 2024-11-18 RX ORDER — CETIRIZINE HYDROCHLORIDE 10 MG/1
10 TABLET ORAL DAILY
Refills: 0 | Status: DISCONTINUED | OUTPATIENT
Start: 2024-11-18 | End: 2024-11-19

## 2024-11-18 RX ORDER — ENOXAPARIN SODIUM 30 MG/.3ML
40 INJECTION SUBCUTANEOUS EVERY 24 HOURS
Refills: 0 | Status: DISCONTINUED | OUTPATIENT
Start: 2024-11-18 | End: 2024-11-19

## 2024-11-18 RX ORDER — CHLORHEXIDINE GLUCONATE 1.2 MG/ML
15 RINSE ORAL
Refills: 0 | Status: DISCONTINUED | OUTPATIENT
Start: 2024-11-18 | End: 2024-11-19

## 2024-11-18 RX ORDER — ONDANSETRON HYDROCHLORIDE 4 MG/1
4 TABLET, FILM COATED ORAL EVERY 8 HOURS
Refills: 0 | Status: DISCONTINUED | OUTPATIENT
Start: 2024-11-18 | End: 2024-11-19

## 2024-11-18 RX ORDER — PANTOPRAZOLE SODIUM 40 MG/1
40 TABLET, DELAYED RELEASE ORAL
Refills: 0 | Status: DISCONTINUED | OUTPATIENT
Start: 2024-11-18 | End: 2024-11-19

## 2024-11-18 RX ADMIN — SODIUM CHLORIDE 1000 MILLILITER(S): 9 INJECTION, SOLUTION INTRAMUSCULAR; INTRAVENOUS; SUBCUTANEOUS at 01:36

## 2024-11-18 RX ADMIN — MIRTAZAPINE 15 MILLIGRAM(S): 15 TABLET, FILM COATED ORAL at 12:37

## 2024-11-18 RX ADMIN — Medication 1000 MILLIGRAM(S): at 21:21

## 2024-11-18 RX ADMIN — Medication 15 MILLIGRAM(S): at 17:27

## 2024-11-18 RX ADMIN — CETIRIZINE HYDROCHLORIDE 10 MILLIGRAM(S): 10 TABLET ORAL at 12:37

## 2024-11-18 RX ADMIN — Medication 50 MICROGRAM(S): at 07:24

## 2024-11-18 RX ADMIN — Medication 10 MILLIGRAM(S): at 21:21

## 2024-11-18 RX ADMIN — PANTOPRAZOLE SODIUM 40 MILLIGRAM(S): 40 TABLET, DELAYED RELEASE ORAL at 07:25

## 2024-11-18 RX ADMIN — TESTOSTERONE 50 MILLIGRAM(S): 16.2 GEL TRANSDERMAL at 12:38

## 2024-11-18 RX ADMIN — Medication 500 MILLIGRAM(S): at 12:37

## 2024-11-18 RX ADMIN — Medication 2: at 08:18

## 2024-11-18 RX ADMIN — Medication 1: at 17:14

## 2024-11-18 RX ADMIN — Medication 1: at 12:33

## 2024-11-18 RX ADMIN — ENOXAPARIN SODIUM 40 MILLIGRAM(S): 30 INJECTION SUBCUTANEOUS at 07:25

## 2024-11-18 RX ADMIN — CLONIDINE HYDROCHLORIDE 0.3 MILLIGRAM(S): 0.3 TABLET ORAL at 17:27

## 2024-11-18 RX ADMIN — Medication 2 TABLET(S): at 21:21

## 2024-11-18 RX ADMIN — MIRTAZAPINE 22.5 MILLIGRAM(S): 15 TABLET, FILM COATED ORAL at 21:21

## 2024-11-18 RX ADMIN — TAMSULOSIN HYDROCHLORIDE 0.8 MILLIGRAM(S): 0.4 CAPSULE ORAL at 21:22

## 2024-11-18 RX ADMIN — RISPERIDONE 4 MILLIGRAM(S): 4 TABLET ORAL at 22:36

## 2024-11-18 NOTE — CARE COORDINATION ASSESSMENT. - NSCAREPROVIDERS_GEN_ALL_CORE_FT
CARE PROVIDERS:  Accepting Physician: Cameron Rodriguez  Administration: Ki Townsend  Administration: Andreas Hodge  Administration: Case Sampson  Admitting: Cameron Rodriguez  Attending: Manuel Toribio  Consultant: Zechariah Slaughter  Consultant: Rafy Omalley  ED ACP: Ashanti Birch ED Attending: Hermann Herrera ED Nurse: Josselyn Brothers  Nurse: Chaya Muir  Nurse: Josselyn Brothers  Nurse: Geraldine Choudhray  Nurse: Angela Aldana  Oncology: Tracey Johnston  Ordered: Doctor, Unknown  Outpatient Provider: Marco Paul  Override: Geraldine Choudhary  Override: Chaya Muir  PCA/Nursing Assistant: Vania Colbert  PCA/Nursing Assistant: Britney Horowitz  PCA/Nursing Assistant: Bety Lynch  Primary Team: Manuel Toribio  Primary Team: Heike Munoz  Primary Team: Jennifer Morocho  Primary Team: Carolyn Villa  Primary Team: Cameron Rodriguez  : Jackie Steven  Team: [facility], Team  UR// Supp. Assoc.: Lawanda Quick  UR// Supp. Assoc.: Leah Jara  UR// Supp. Assoc.: Lela Gan

## 2024-11-18 NOTE — ED BEHAVIORAL HEALTH ASSESSMENT NOTE - ABORTED (SELF-INTERRUPTED) ATTEMPT:
POST-OP DIAGNOSIS:  Primary localized osteoarthritis of right hip 18-Nov-2024 07:25:31  Artemio Joshi   None known

## 2024-11-18 NOTE — H&P ADULT - NSHPPHYSICALEXAM_GEN_ALL_CORE
VITALS:   T(C): 36.7 (11-17-24 @ 23:32), Max: 36.7 (11-17-24 @ 23:32)  HR: 110 (11-17-24 @ 23:32) (110 - 110)  BP: 127/90 (11-17-24 @ 23:32) (127/90 - 127/90)  RR: 18 (11-17-24 @ 23:32) (18 - 18)  SpO2: 96% (11-17-24 @ 23:32) (96% - 96%)    CONSTITUTIONAL: Well groomed, no apparent distress  EYES: PERRL and symmetric, EOMI  ENMT: Oral mucosa with moist membranes.  NECK: Supple, symmetric and without tracheal deviation   RESP: No respiratory distress, no use of accessory muscles; CTA b/l, no WRR  CV: RRR, +S1S2, no MRG;  no peripheral edema  GI: Soft, mild tenderness at suprapubic region; ND, no rebound, no guarding  LYMPH: No cervical LAD or tenderness  MSK: No digital clubbing or cyanosis; examination of the (head/neck/spine/ribs/pelvis, RUE, LUE, RLE, LLE) without misalignment,   SKIN:  Healing linear superficial lac to RUE  NEURO/PSYCH: A+O x 3. CN II-XII intact; sensation intact in upper and lower extremities b/l to light touch. normal affect, calm VITALS:   T(C): 36.7 (11-17-24 @ 23:32), Max: 36.7 (11-17-24 @ 23:32)  HR: 110 (11-17-24 @ 23:32) (110 - 110)  BP: 127/90 (11-17-24 @ 23:32) (127/90 - 127/90)  RR: 18 (11-17-24 @ 23:32) (18 - 18)  SpO2: 96% (11-17-24 @ 23:32) (96% - 96%)    CONSTITUTIONAL: Well groomed, no apparent distress  EYES: PERRL and symmetric, EOMI  ENMT: Oral mucosa with moist membranes.  NECK: Supple, symmetric and without tracheal deviation   RESP: No respiratory distress, no use of accessory muscles; CTA b/l, no WRR  CV: RRR, +S1S2, no MRG;  no peripheral edema  GI: Soft, mild tenderness at suprapubic region; ND, no rebound, no guarding  LYMPH: No cervical LAD or tenderness  MSK: No digital clubbing or cyanosis; examination of the (head/neck/spine/ribs/pelvis, RUE, LUE, RLE, LLE) without misalignment,   SKIN:  Fresh puncture marks on LUE  NEURO: A+O x 3. CN II-XII intact; sensation intact in upper and lower extremities b/l to light touch.   PSYCH: flat affect, appears calm

## 2024-11-18 NOTE — ED ADULT NURSE REASSESSMENT NOTE - NS ED NURSE REASSESS COMMENT FT1
Indwelling mckeon catheter F16  inserted connected to urine bag draining a yellowish urine output with initial output of 240ml. Dr. Mccrary made aware. Patient tolerated procedure well.

## 2024-11-18 NOTE — ED PROVIDER NOTE - PROGRESS NOTE DETAILS
labs pending. linda ordered. Dr. Herrera will assume care Patient stable.  Bladder scan reveals 150 mL in bladder.  Patient states he is unable to pee.  No obvious distention on exam.  Will order labs and give fluids and reassess.  Consider Sar[place]ia placement if unable to pee.  Due to tenderness, recommend CAT scan.  Patient reports multiple CAT scans in the past.  Declining CAT scan at this time.  Dr. Hodge will assume care Hermann Herrera MD   after IVF, pt had almost 300cc in bladder. mckeon placed with yellow return of urine. labs nonactionable. pt unable to go back to facility due to mckeon placement. endorsed to dr farrell for nontele admission. pt and staff at bedside updated about plan Hermann Herrera MD   after IVF, pt had almost 300cc in bladder on bladder scan. mckeon placed with yellow return of urine. labs nonactionable. pt unable to go back to facility due to mckeon placement. endorsed to dr farrell for nontele admission. pt and staff at bedside updated about plan

## 2024-11-18 NOTE — BH CONSULTATION LIAISON ASSESSMENT NOTE - NSBHCHARTREVIEWVS_PSY_A_CORE FT
Vital Signs Last 24 Hrs  T(C): 36.3 (18 Nov 2024 07:09), Max: 36.7 (17 Nov 2024 23:32)  T(F): 97.4 (18 Nov 2024 07:09), Max: 98.1 (17 Nov 2024 23:32)  HR: 79 (18 Nov 2024 07:09) (79 - 110)  BP: 104/71 (18 Nov 2024 07:09) (104/71 - 127/90)  BP(mean): --  RR: 16 (18 Nov 2024 07:09) (16 - 18)  SpO2: 95% (18 Nov 2024 07:09) (95% - 96%)    Parameters below as of 18 Nov 2024 07:09  Patient On (Oxygen Delivery Method): room air

## 2024-11-18 NOTE — PROGRESS NOTE ADULT - SUBJECTIVE AND OBJECTIVE BOX
CHIEF COMPLAINT/INTERVAL HISTORY:  Pt. seen and evaluated for urinary retention.  Pt. is calm and in no distress.  s/p mckeon catheter insertion.  Reports improvement in abdominal pain.     REVIEW OF SYSTEMS:  No fever, CP, or SOB.    Vital Signs Last 24 Hrs  T(C): 36.3 (18 Nov 2024 07:09), Max: 36.7 (17 Nov 2024 23:32)  T(F): 97.4 (18 Nov 2024 07:09), Max: 98.1 (17 Nov 2024 23:32)  HR: 79 (18 Nov 2024 07:09) (79 - 110)  BP: 104/71 (18 Nov 2024 07:09) (104/71 - 127/90)  BP(mean): --  RR: 16 (18 Nov 2024 07:09) (16 - 18)  SpO2: 95% (18 Nov 2024 07:09) (95% - 96%)    Parameters below as of 18 Nov 2024 07:09  Patient On (Oxygen Delivery Method): room air        PHYSICAL EXAM:  GENERAL: NAD  HEENT: EOMI, hearing normal, conjunctiva and sclera clear  Chest: CTA bilaterally, no wheezing  CV: S1S2, RRR,   GI: soft, +BS, NT/ND  : +mckeon catheter draining yellowish orange urine  Musculoskeletal: no LE edema  Psychiatric: calm  Skin: warm and dry, fresh puncture marks on LUE    LABS:                        12.2   8.17  )-----------( 211      ( 18 Nov 2024 05:43 )             38.6     11-18    138  |  103  |  12  ----------------------------<  180[H]  4.1   |  26  |  0.87    Ca    8.6      18 Nov 2024 05:43    TPro  6.0  /  Alb  2.9[L]  /  TBili  0.2  /  DBili  x   /  AST  12[L]  /  ALT  35  /  AlkPhos  76  11-18      Urinalysis Basic - ( 18 Nov 2024 05:43 )    Color: x / Appearance: x / SG: x / pH: x  Gluc: 180 mg/dL / Ketone: x  / Bili: x / Urobili: x   Blood: x / Protein: x / Nitrite: x   Leuk Esterase: x / RBC: x / WBC x   Sq Epi: x / Non Sq Epi: x / Bacteria: x

## 2024-11-18 NOTE — BH CONSULTATION LIAISON ASSESSMENT NOTE - NSBHCHARTREVIEWLAB_PSY_A_CORE FT
12.2   8.17  )-----------( 211      ( 18 Nov 2024 05:43 )             38.6   11-18    138  |  103  |  12  ----------------------------<  180[H]  4.1   |  26  |  0.87    Ca    8.6      18 Nov 2024 05:43    TPro  6.0  /  Alb  2.9[L]  /  TBili  0.2  /  DBili  x   /  AST  12[L]  /  ALT  35  /  AlkPhos  76  11-18

## 2024-11-18 NOTE — H&P ADULT - PROBLEM SELECTOR PLAN 6
Takes metformin at home  - Hold home medication  - Start LDISS  - POCT QAC QHS, Hypoglycemia protocol   - A1C 7.2 09/2024.

## 2024-11-18 NOTE — H&P ADULT - ASSESSMENT
34M PMH of Bipolar Disorder, Autism, ADHD, MR, Depression, behavioral disorder requiring previous inpatient psychiatric admissions and constant 1:1 observation, Hypothyroidism, Asthma, DM2, HTN, HLD, BPH with urinary retention, hx testicular cancer s/p b/l orchiectomy [facility] in [place] presented to the ED  for urinary retention. 34M PMH of Bipolar Disorder, Autism, ADHD, MR, Depression, behavioral disorder requiring previous inpatient psychiatric admissions and constant 1:1 observation, Hypothyroidism, Asthma, DM2, HTN, HLD, BPH with urinary retention, hx testicular cancer s/p b/l orchiectomy admitted for the treatment of urinary retention.

## 2024-11-18 NOTE — ED ADULT NURSE NOTE - ED STAT RN HANDOFF TIME
Occupational Therapy  Visit Type: treatment  Precautions:  Medical precautions:  fall risk;.   Spine:     Lumbar: lumbar brace on when out of bed, LSO, no hip flexion past 90 degree, no lifting greater than 10 #, no twisting and no bending  Hearing: no hearing deficits  Vision:     Current vision: wears glasses all the time  Safety Measures: chair alarm    SUBJECTIVE                                                                                                            Patient agreed to participate in therapy this date.  \"I am doing better today\"    RNGenet, approved of therapy session  Patient / Family Goal: return to previous functional status and maximize function  Pain   RN informed on pain level      OBJECTIVE                                                                                                              Level of consciousness: alert    Oriented to person, place, time and situation     Arousal alertness: appropriate responses to stimuli    Affect/Behavior: alert, cooperative and pleasant  Patient activity tolerance: 1 to 1 activity to rest  Balance  Sitting:    Static:  modified independent     Dynamic:  modified independent  Standing - Firm Surface - Eyes Open:     Static: modified independent    Dynamic:  modified independent    Bed mobility:      Supine to sit: modified independent    Sit to supine: modified independent  Training completed:      Education details: body mechanics    With head of bed elevated and grab bar use. Demonstrates ability to complete log roll      Transfers:    Assistive devices: gait belt and 2-wheeled walker    Sit to stand: modified independent    Stand to sit: modified independent  Training completed:    Tasks: sit to stand and stand to sit    Education details: patient safety and body mechanics    Utilizes grab bars as appropriate.  Verbal cues for breathing for pain management  Functional Ambulation:    Assistance:modified independent   Assistive  device:2-wheeled walker and gait belt    Distance (ft): 30  Details/Comments: Modified independence for ambulation in room for ADL participation with increased time needed.  No loss of balance noted.  Activities of Daily Living (ADLs):  Lower Body Dressing:     Assist: modified independent    Position: chair    Footwear assistance: modified independent    Footwear position: chair    Assist needed for: don/doff right sock, don/doff left sock, thread left lower extremity into pants and thread right lower extremity into pants    Equipment: reacher and sock aid  Toilet transfer:     Assist: modified independent    Device: gait belt and 2-wheeled walker  Toileting:     Assist: modified independent    Equipment: grab bar use  Activities of daily living training:   Modified independence needed as listed above with grab bar use, extra time and AE utilized to complete task.      Interventions                                                                                                                 Training provided: activity tolerance, balance retraining, bed mobility training, body mechanics, breathing/relaxation, caregiver training, cognitive training, compensatory techniques, functional ambulation, neuromuscular reeducation, positioning, transfer training, safety training, ADL training and use of adaptive equipment  Skilled input: verbal instruction/cues, facilitation and inhibition    Education/instruction on: OT plan of care, reviewed precautions, use of AE for LE dressing  Verbal Consent: Writer verbally educated and received verbal consent for hand placement, positioning of patient, and techniques to be performed today from patient for therapist position for techniques and hand placement and palpation for techniques as described above and how they are pertinent to the patient's plan of care.        ASSESSMENT                                                                                                                 Impairments: activity tolerance, balance, bed mobility, cognitive, coordination/proprioception, decreased insight into deficits, safety awareness and strength  Functional Limitations: bed mobility, functional mobility, eating, grooming, bathing, toileting, functional transfers, dressing and showering  Assessment:   Pt seen on 4LM floor for OT treatment with primary emphasis on LE dressing with AE, toileting, grooming in stance and functional mobility in relation to ADLs with continued limitations as listed above. Pt able to recall all precautions, but demonstrates impaired carry over of precautions into functional tasks requiring verbal cues to maintain precautions during dressing tasks. Continued to educate pt on importance of maintaining precautions in relation to surgical procedure and healing. Pt at this time is progressing towards OT goals.  Overall pt is functioning at Supervision for ADLs and Supervision for functional mobility with use of wheeled walker. For further details on treatment see above.           Discharge Recommendations  Recommendations for Discharge: OT WI: Home, Home therapy            PT/OT Mobility Equipment for Discharge: may need WW  PT/OT ADL Equipment for Discharge: purchased reacher and sock aid from internet  OT Identified Barriers to Discharge: stairs, pain, ADL dysfunction     Skilled therapy is required to address these limitations in attempt to maximize the patient's independence.    End of Session:   Location: in chair  Safety measures: alarm system in place/re-engaged, lines intact and call light within reach  Handoff to: nurse    PLAN                                                                                                                          Suggestions for next session as indicated: Tub transfer and bathing education          Frequency Comments: SUN (d/c pending)         Interventions: activity tolerance training, ADL retraining, balance, bed mobility training,  cognitive retraining, coordination, functional transfer training, neuromuscular reeducation, patient education, positioning, patient/family training, transfer training, therapeutic exercise, upper extremity strengthening/ROM, compensatory techniques, compensatory technique education, therapeutic activity, safety training and caregiver training  Agreement to plan and goals: patient agrees with goals and treatment plan      Goals Reviewed: 9/21/2020  GOALS:  Long Term Goals: (to be met by time of discharge from hospital)  Grooming: Patient will complete grooming tasks modified independent.  Upper body dressing: Patient will complete upper body dressing modified independent.  Lower body dressing: Patient will complete lower body dressing modified independent.  Toileting: Patient will complete toileting modified independent.  Bathing: Patient will complete bathingmodified independent Toilet transfer: Patient will complete toilet transfer with modified independent.   Tub/shower transfer: Patient will complete tub/shower transfer with modified independent.         Documented in the chart in the following areas: Pain. Assessment. Plan. Patient Education.       07:28
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
## 2024-11-18 NOTE — CONSULT NOTE ADULT - SUBJECTIVE AND OBJECTIVE BOX
HPI:  34M PMH of Bipolar Disorder, Autism, ADHD, MR, Depression, behavioral disorder requiring previous inpatient psychiatric admissions and constant 1:1 observation, Hypothyroidism, Asthma, DM2, HTN, HLD, urinary retention, hx testicular cancer s/p b/l orchiectomy [facility] from [facility] resides at [facility] in [place] comes in  for urinary retention. States last urinated last morning and been unable to urinate since then.  Reports some suprapubic abdominal pressure.  Denies any nausea, vomiting, diarrhea, or fevers.  History of frequent visits for urinary retention.  Last admitted 11/9/24 -11/12/24 for urinary retention. Of note, pt had been here multiple times for same issue, mckeon was placed,  TOV was completed and discharged back to his group home; pt's group home does not have the resources to take care and manage the mckeon cath.     Patient states that he is more stressed and depressed than before. Has LUE puncture marks from self-harm episode that he did 1/2 hr before coming to ED. Currently does not have any thought of suicidal or homicidal thoughts.     Interval HPI:  Patient seen and examined at bedside this morning, presented to the ED with complaints of inability to urinate since last night, with increased abdominal pain. States that he did have symptoms of dysuria and thought that he noted hematuria, denies any fever or chills. Also reports that he has not been drinking much fluid lately. UA negative, ED placed a mckeon with dark yellow output.       PAST MEDICAL & SURGICAL HISTORY:  Intellectual disability    Obesity    Asthma    GERD (gastroesophageal reflux disease)    Factitious disorder    Urinary retention    Enuresis    Myopia of both eyes    Autism    Hypothyroid    History of BPH    HLD (hyperlipidemia)    Impulse control disorder    Type 2 diabetes mellitus    Testicular cancer    Autism    History of orchiectomy      REVIEW OF SYSTEMS  Head: denies headaches, dizziness & lightheadedness  Eyes: denies changes in vision, eye pain, double vision & eye discharge  Ears: denies changes in hearing & ear discharge  Nose: denies rhinorrhea  Mouth: denies bleeding gums & sore tongue & sore throat  Neck: denies swollen lymph nodes   Respiratory: denies SOB, cough, sputum production, wheezing  Cardiac: denies CP & irregular heart beat  Abdominal: +abdominal pain  : +inability to urinate, +dysuria   Musculoskeletal: denies joint pain & muscle pain  Neuro: denies involuntary muscle movements  Psych: no depression, no anxiety     MEDICATIONS  (STANDING):  atorvastatin 10 milliGRAM(s) Oral at bedtime  busPIRone 15 milliGRAM(s) Oral <User Schedule>  cetirizine 10 milliGRAM(s) Oral daily  chlorhexidine 0.12% Liquid 15 milliLiter(s) Swish and Spit two times a day  cloNIDine 0.3 milliGRAM(s) Oral every 12 hours  dextrose 5%. 1000 milliLiter(s) (100 mL/Hr) IV Continuous <Continuous>  dextrose 5%. 1000 milliLiter(s) (50 mL/Hr) IV Continuous <Continuous>  dextrose 50% Injectable 25 Gram(s) IV Push once  dextrose 50% Injectable 12.5 Gram(s) IV Push once  dextrose 50% Injectable 25 Gram(s) IV Push once  divalproex  milliGRAM(s) Oral daily  divalproex ER 1000 milliGRAM(s) Oral at bedtime  enoxaparin Injectable 40 milliGRAM(s) SubCutaneous every 24 hours  glucagon  Injectable 1 milliGRAM(s) IntraMuscular once  insulin lispro (ADMELOG) corrective regimen sliding scale   SubCutaneous three times a day before meals  insulin lispro (ADMELOG) corrective regimen sliding scale   SubCutaneous at bedtime  levothyroxine 50 MICROGram(s) Oral daily  mirtazapine 22.5 milliGRAM(s) Oral at bedtime  mirtazapine 15 milliGRAM(s) Oral daily  pantoprazole    Tablet 40 milliGRAM(s) Oral before breakfast  risperiDONE   Tablet 4 milliGRAM(s) Oral two times a day  senna 2 Tablet(s) Oral at bedtime  tamsulosin 0.8 milliGRAM(s) Oral at bedtime  testosterone 1% Gel 50 milliGRAM(s) Topical daily    MEDICATIONS  (PRN):  acetaminophen     Tablet .. 650 milliGRAM(s) Oral every 6 hours PRN Temp greater or equal to 38C (100.4F), Mild Pain (1 - 3)  aluminum hydroxide/magnesium hydroxide/simethicone Suspension 30 milliLiter(s) Oral every 4 hours PRN Dyspepsia  dextrose Oral Gel 15 Gram(s) Oral once PRN Blood Glucose LESS THAN 70 milliGRAM(s)/deciliter  melatonin 3 milliGRAM(s) Oral at bedtime PRN Insomnia  ondansetron Injectable 4 milliGRAM(s) IV Push every 8 hours PRN Nausea and/or Vomiting      Allergies    Zyprexa (Unknown)  Haldol (Other)  Celebrex (Short breath)  Zyprexa (Dystonic RXN)  Haldol (Rash)  Bee stings (Unknown)    Intolerances      FAMILY HISTORY:  FH: diabetes mellitus (Mother)        Vital Signs Last 24 Hrs  T(C): 36.3 (18 Nov 2024 07:09), Max: 36.7 (17 Nov 2024 23:32)  T(F): 97.4 (18 Nov 2024 07:09), Max: 98.1 (17 Nov 2024 23:32)  HR: 79 (18 Nov 2024 07:09) (79 - 110)  BP: 104/71 (18 Nov 2024 07:09) (104/71 - 127/90)  BP(mean): --  RR: 16 (18 Nov 2024 07:09) (16 - 18)  SpO2: 95% (18 Nov 2024 07:09) (95% - 96%)    Parameters below as of 18 Nov 2024 07:09  Patient On (Oxygen Delivery Method): room air        PHYSICAL EXAM:  Constitutional: AAOx3, no acute distress  HEENT: NCAT, airway patent  Cardiovascular: RRR, pulses present bilaterally  Respiratory: nonlabored breathing  Gastrointestinal: abdomen soft, nontender, non distended, no rebound or guarding  Neuro: no focal deficits  Extremities: non edematous, no calf pain bilaterally   : mckeon in place draining dark yellow urine       LABS:                        12.2   8.17  )-----------( 211      ( 18 Nov 2024 05:43 )             38.6     11-18    138  |  103  |  12  ----------------------------<  180[H]  4.1   |  26  |  0.87    Ca    8.6      18 Nov 2024 05:43    TPro  6.0  /  Alb  2.9[L]  /  TBili  0.2  /  DBili  x   /  AST  12[L]  /  ALT  35  /  AlkPhos  76  11-18      Urinalysis Basic - ( 18 Nov 2024 05:43 )    Color: x / Appearance: x / SG: x / pH: x  Gluc: 180 mg/dL / Ketone: x  / Bili: x / Urobili: x   Blood: x / Protein: x / Nitrite: x   Leuk Esterase: x / RBC: x / WBC x   Sq Epi: x / Non Sq Epi: x / Bacteria: x

## 2024-11-18 NOTE — ED ADULT NURSE NOTE - OBJECTIVE STATEMENT
Brought in by ambulance c/o right lower abdominal pain started tonight. Patient states was unable to urinate since morning. Last time he urinated at around 7AM. Patient denies nausea. vomiting/ diarrhea/ no fever.

## 2024-11-18 NOTE — BH CONSULTATION LIAISON ASSESSMENT NOTE - HPI (INCLUDE ILLNESS QUALITY, SEVERITY, DURATION, TIMING, CONTEXT, MODIFYING FACTORS, ASSOCIATED SIGNS AND SYMPTOMS)
Patient seen, evaluated and chart reviewed. Patient is a 34-year-old  SWM, known to the undersigned from the previous admission, with history of autism, testicular cancer status post bilateral orchiectomy, diabetes mellitus, impulse control disorder, hyperlipidemia, BPH, hypothyroidism, urinary retention, GERD, asthma, obesity, and intellectual disability brought in by ambulance for urinary retention that started today.  States last urinated this morning.  States unable to urinate since then.  Reports some suprapubic abdominal pressure.  Denies any nausea, vomiting, diarrhea, or fevers.  History of frequent visits for urinary retention.  Last admitted November 9 through 12 for urinary retention.  States Sarabia usually needs to be placed for urinary retention.  Also states he lives at group home and their policy is he is not able to have Sarabia at the group home so needs to be admitted when Sarabia needs to be placed. Patient at this time appears to be calm although requests inpatient psychiatric admission for unclear reasons.

## 2024-11-18 NOTE — H&P ADULT - NSHPREVIEWOFSYSTEMS_GEN_ALL_CORE
CONSTITUTIONAL: denies fever, chills, fatigue, weakness  HEENT: denies blurred vision, sore throat  SKIN: denies new lesions, rash  CARDIOVASCULAR: denies chest pain, chest pressure, palpitations  RESPIRATORY: denies shortness of breath, cough, sputum production  GASTROINTESTINAL: denies nausea, vomiting, diarrhea, abdominal pain, melena or hematochezia  GENITOURINARY: + dysuria, urinary retention; denies discharge  NEUROLOGICAL: denies numbness, headache, focal weakness  MUSCULOSKELETAL: denies new joint pain, muscle aches  HEMATOLOGIC: denies gross bleeding, bruising CONSTITUTIONAL: denies fever, chills, fatigue, weakness  HEENT: denies blurred vision, sore throat  SKIN: denies new lesions, rash  CARDIOVASCULAR: denies chest pain, chest pressure, palpitations  RESPIRATORY: denies shortness of breath, cough, sputum production  GASTROINTESTINAL: denies nausea, vomiting, diarrhea, abdominal pain, melena or hematochezia  GENITOURINARY: + dysuria, urinary retention; denies discharge  NEUROLOGICAL: denies numbness, headache, focal weakness  MUSCULOSKELETAL: denies new joint pain, muscle aches  HEMATOLOGIC: denies gross bleeding, bruising  Psych: + Sadness, depression, anxiety

## 2024-11-18 NOTE — CONSULT NOTE ADULT - ASSESSMENT
34M PMH of Bipolar Disorder, Autism, ADHD, MR, Depression, behavioral disorder requiring previous inpatient psychiatric admissions and constant 1:1 observation, Hypothyroidism, Asthma, DM2, HTN, HLD, BPH(?) with urinary retention, hx testicular cancer s/p b/l orchiectomy admitted for the treatment of urinary retention.    Plan:  - continue tamsulosin   - UA wnl  - CT without BPH or hydro  - BUN/Cr wnl  - can TOV with PVR and dc post void

## 2024-11-18 NOTE — PATIENT PROFILE ADULT - SW.
89yo M w/ PMHx of DM2, HTN, CHF, pAfib/flutter (on Eliquis), SSS s/p PPM, Hx of anemia and GAVE syndrome, presents with anemia, pt reports that over the last few days he has had fatigue, dyspnea on exertion, denies chest pain, abdominal pain, hematuria, melena, hematochezia, of note pt recently admitted 4/4-4/8 at [facility] for anemia requiring multiple transfusions and had extensive GI workup and anemia workup w/ findings suspicious for GAVE syndrome, pt went to his PCP today and had routine blood work showing his Hg 7.2 and was instructed to come to the ED for further management, in the ED, pt was tachycardic but otherwise VSS, labs notable for Hg 6.6 (baseline btwn 8-9) and mildly elevated Cr, pt ordered for 2U PRBC which are pending, admitted to general medicine for further management.   social work

## 2024-11-18 NOTE — H&P ADULT - PROBLEM SELECTOR PLAN 1
Patient presenting with difficulty urinating since last am, 350cc residual volume on bladder scan needs mckeon insertion  - UA unremarkable   - CT A/P w/IVC 09/2024: no findings on imaging that can be associated to urinary retention, prostate was seen to be within normal limits in size   - Continue home tamsulosin 0.8mg QHS   - As this is the patient's 4th admission to [facility] within past two months, urologist to be consulted by team as routine consult. Pt's outpatient urologist is Dr. Jack Paul. Patient presenting with difficulty urinating since last am, 350cc residual volume on bladder scan needs Mckeon insertion  - Initial UOP on mckeon insertion was 240 cc, subsequently another 225cc by 6am  - UA unremarkable   - CT A/P w/IVC 09/2024: no findings on imaging that can be associated to urinary retention, prostate was seen to be within normal limits in size   - Continue home tamsulosin 0.8mg QHS   - As this is the patient's 4th admission to [facility] within past two months, urologist to be consulted by team as routine consult. Pt's outpatient urologist is Dr. Jack Paul.

## 2024-11-18 NOTE — CARE COORDINATION ASSESSMENT. - REASON FOR CONSULT
coordination of care/discharge planning/admitted from facility/patient meets high risk criteria (i.e.: STARS admit., readmit w/in 30 days)

## 2024-11-18 NOTE — PATIENT PROFILE ADULT - FALL HARM RISK - RISK INTERVENTIONS
General
Assistance OOB with selected safe patient handling equipment/Assistance with ambulation/Communicate Fall Risk and Risk Factors to all staff, patient, and family/Discuss with provider need for PT consult/Monitor gait and stability/Reinforce activity limits and safety measures with patient and family/Visual Cue: Yellow wristband/Bed in lowest position, wheels locked, appropriate side rails in place/Call bell, personal items and telephone in reach/Instruct patient to call for assistance before getting out of bed or chair/Non-slip footwear when patient is out of bed/[place] to call system/Physically safe environment - no spills, clutter or unnecessary equipment/Purposeful Proactive Rounding/Room/bathroom lighting operational, light cord in reach

## 2024-11-18 NOTE — CARE COORDINATION ASSESSMENT. - OTHER PERTINENT REFERRAL INFORMATION
Sw met with patient at bedside. Pt is OMRDD from group home, A & O x1 and on a constant 1:1 due to mental health and hx of SI with attempts. Pt is a readmission and was at [facility] from 11/9-11/12 and has been had multiple admission for urinary Retention. PT is 1 of 2 residents in the group home and they can not manage mckeon at the [facility] as they do not have the staff or resources. Pt is WC bound and staff at [facility] assist with ADL's. The patient has a therapist in the community for his mental health concerns. They will require 24 hr notice prior to DC back and can  the patient when he is medically ready.  [place] [phone]

## 2024-11-18 NOTE — ED ADULT NURSE NOTE - PAIN: PRESENCE, MLM
Nosebleeds Normal Treatment: I explained this is common when taking isotretinoin. I recommended saline mist in each nostril multiple times a day. If this worsens they will contact us. complains of pain/discomfort

## 2024-11-18 NOTE — H&P ADULT - HISTORY OF PRESENT ILLNESS
States last urinated last morning and been unable to urinate since then.  Reports some suprapubic abdominal pressure.  Denies any nausea, vomiting, diarrhea, or fevers.  History of frequent visits for urinary retention.  Last admitted 11/9/24 -11/12/24 for urinary retention. Of note, pt had been here multiple times for same issue, mckeon was placed,  TOV was completed and discharged back to his group home; pt's group home does not have the resources to take care and manage the mckeon cath.     Patient has RUE scar from self-harm episode around 2 weeks prior. Currently does not have any thought of suicidal or homicidal thoughts.       ED Course:   Vitals: BP: 127/90, HR: 110, Temp: 98.1, RR: 18, SpO2: 96% on RA   Labs:  CBC generally WNL, hemolyzed specimen for CMP with BUN/Cr 15/1.10  UA (-)  EKG:   Received in the ED: 1L NS Bolus   34M PMH of Bipolar Disorder, Autism, ADHD, MR, Depression, behavioral disorder requiring previous inpatient psychiatric admissions and constant 1:1 observation, Hypothyroidism, Asthma, DM2, HTN, HLD, BPH with urinary retention, hx testicular cancer s/p b/l orchiectomy [facility]S from [facility] resides at [facility] in [place] comes in  for urinary retention. States last urinated last morning and been unable to urinate since then.  Reports some suprapubic abdominal pressure.  Denies any nausea, vomiting, diarrhea, or fevers.  History of frequent visits for urinary retention.  Last admitted 11/9/24 -11/12/24 for urinary retention. Of note, pt had been here multiple times for same issue, mckeon was placed,  TOV was completed and discharged back to his group home; pt's group home does not have the resources to take care and manage the mckeon cath.     Patient states that he is more stressed and depressed than before. Has LUE puncture marks from self-harm episode that he did 1/2 hr before coming to ED. Currently does not have any thought of suicidal or homicidal thoughts.       ED Course:   Vitals: BP: 127/90, HR: 110, Temp: 98.1, RR: 18, SpO2: 96% on RA   Labs:  CBC generally WNL, hemolyzed specimen for CMP with BUN/Cr 15/1.10  UA (-)  EKG:   Received in the ED: 1L NS Bolus

## 2024-11-18 NOTE — H&P ADULT - PROBLEM SELECTOR PLAN 2
Continue home tamsulosin. - chronic  - recently increased depression, attempt to self harm the LUE before arriving to the ED  - 1:1 observation  - Psych consult by am teams  - Continue Mirtazapine in the interim.

## 2024-11-18 NOTE — ED ADULT NURSE NOTE - NSFALLUNIVINTERV_ED_ALL_ED
Bed/Stretcher in lowest position, wheels locked, appropriate side rails in place/Call bell, personal items and telephone in reach/Instruct patient to call for assistance before getting out of bed/chair/stretcher/Non-slip footwear applied when patient is off stretcher/[place] to call system/Physically safe environment - no spills, clutter or unnecessary equipment/Purposeful proactive rounding/Room/bathroom lighting operational, light cord in reach

## 2024-11-18 NOTE — SOCIAL WORK PROGRESS NOTE - NSSWPROGRESSNOTE_GEN_ALL_CORE
Sw was consulted for Group home sending pt to end multiple times for urinary rentention and positive depression screen. Pt ia 1 of 2 people in his group home and they do not have the staff or resources to manage his mckeon care. Group Home needs to work on alternative level of care or transfer to another group home that would be able to manage his level of care which is not an option at this time due to patients mental health concerns. Pt is also on a constant 1:1 at hospital and at group home do to hx of depression, SI and past suicide attempts and is followed by a therapist in the community. Sw will cont to follow for safe DC.

## 2024-11-18 NOTE — CASE MANAGEMENT PROGRESS NOTE - NSCMPROGRESSNOTE_GEN_ALL_CORE
CM consult for placement in a group home that has resources to take care of mckeon catheter. CM will continue to collaborate with interdisciplinary team, remain available to assist and monitor for home care needs.

## 2024-11-18 NOTE — BH CONSULTATION LIAISON ASSESSMENT NOTE - NSBHCHARTREVIEWINVESTIGATE_PSY_A_CORE FT
< from: 12 Lead ECG (11.09.24 @ 05:44) >    Ventricular Rate 78 BPM    Atrial Rate 78 BPM    P-R Interval 144 ms    QRS Duration 80 ms    Q-T Interval 370 ms    QTC Calculation(Bazett) 421 ms    P Axis 52 degrees    R Axis 60 degrees    T Axis 14 degrees    Diagnosis Line Poor data quality  Normal sinus rhythm with sinus arrhythmia  Normal ECG      Confirmed by Rubi LEMONS, Pedro Luis ([id number]) on 11/9/2024 12:54:33 PM    < end of copied text >

## 2024-11-18 NOTE — BH CONSULTATION LIAISON ASSESSMENT NOTE - CURRENT MEDICATION
MEDICATIONS  (STANDING):  atorvastatin 10 milliGRAM(s) Oral at bedtime  busPIRone 15 milliGRAM(s) Oral <User Schedule>  cetirizine 10 milliGRAM(s) Oral daily  chlorhexidine 0.12% Liquid 15 milliLiter(s) Swish and Spit two times a day  cloNIDine 0.3 milliGRAM(s) Oral every 12 hours  dextrose 5%. 1000 milliLiter(s) (100 mL/Hr) IV Continuous <Continuous>  dextrose 5%. 1000 milliLiter(s) (50 mL/Hr) IV Continuous <Continuous>  dextrose 50% Injectable 25 Gram(s) IV Push once  dextrose 50% Injectable 12.5 Gram(s) IV Push once  dextrose 50% Injectable 25 Gram(s) IV Push once  divalproex  milliGRAM(s) Oral daily  divalproex ER 1000 milliGRAM(s) Oral at bedtime  enoxaparin Injectable 40 milliGRAM(s) SubCutaneous every 24 hours  glucagon  Injectable 1 milliGRAM(s) IntraMuscular once  influenza   Vaccine 0.5 milliLiter(s) IntraMuscular once  insulin lispro (ADMELOG) corrective regimen sliding scale   SubCutaneous three times a day before meals  insulin lispro (ADMELOG) corrective regimen sliding scale   SubCutaneous at bedtime  levothyroxine 50 MICROGram(s) Oral daily  mirtazapine 22.5 milliGRAM(s) Oral at bedtime  mirtazapine 15 milliGRAM(s) Oral daily  pantoprazole    Tablet 40 milliGRAM(s) Oral before breakfast  risperiDONE   Tablet 4 milliGRAM(s) Oral two times a day  senna 2 Tablet(s) Oral at bedtime  tamsulosin 0.8 milliGRAM(s) Oral at bedtime  testosterone 1% Gel 50 milliGRAM(s) Topical daily    MEDICATIONS  (PRN):  acetaminophen     Tablet .. 650 milliGRAM(s) Oral every 6 hours PRN Temp greater or equal to 38C (100.4F), Mild Pain (1 - 3)  aluminum hydroxide/magnesium hydroxide/simethicone Suspension 30 milliLiter(s) Oral every 4 hours PRN Dyspepsia  dextrose Oral Gel 15 Gram(s) Oral once PRN Blood Glucose LESS THAN 70 milliGRAM(s)/deciliter  melatonin 3 milliGRAM(s) Oral at bedtime PRN Insomnia  ondansetron Injectable 4 milliGRAM(s) IV Push every 8 hours PRN Nausea and/or Vomiting

## 2024-11-18 NOTE — ED PROVIDER NOTE - OBJECTIVE STATEMENT
34-year-old male with history of autism, testicular cancer status post bilateral orchiectomy, diabetes mellitus, impulse control disorder, hyperlipidemia, BPH, hypothyroidism, urinary retention, GERD, asthma, obesity, and intellectual disability brought in by ambulance for urinary retention that started today.  States last urinated this morning.  States unable to urinate since then.  Reports some suprapubic abdominal pressure.  Denies any nausea, vomiting, diarrhea, or fevers.  History of frequent visits for urinary retention.  Last admitted November 9 through 12 for urinary retention.  States Sarabia usually needs to be placed for urinary retention.  Also states he lives at group home and their policy is he is not able to have Sarabia at the group home so needs to be admitted when Sarabia needs to be placed.  PCP Vinnie  Urologist Jack Paul

## 2024-11-18 NOTE — ED PROVIDER NOTE - CLINICAL SUMMARY MEDICAL DECISION MAKING FREE TEXT BOX
34M PMH Bipolar Disorder, Autism, ADHD, MR, Depression, behavioral disorder p/w suprapubic pressure with last urination at 6AM. Pt called EMS from his facility due to the pain. He states he is not able to go back to his facility with a catheter. Pt states he does not have any SI/HI/hallucinations    Gen:  awake alert   HEENT: normal conjunctiva, oral mucosa moist  Lung: CTAB, no respiratory distress, no wheezes/rhonchi/rales B/L, speaking in full sentences  CV: RRR  Abd: soft, suprapubic TTP, ND, no guarding, no rigidity, no rebound tenderness  MSK: no visible deformities  Skin: Warm, well perfused, no rash, no leg swelling  Psych: normal affect, calm     DDx includes but not limited to: likely urinary retention in setting of UTI vs BPH vs stricture. Will eval for ROSA, lyte derangements    Plan: mckeon placement, labs, reassess symptoms    See Progress Notes for further updates on ED Course

## 2024-11-18 NOTE — ED PROVIDER NOTE - ATTENDING APP SHARED VISIT CONTRIBUTION OF CARE
Hermann Herrera MD:  patient seen and evaluated with the PA.  I was present for key portions of the History & Physical, and I agree with the Impression & Plan.     34M PMH Bipolar Disorder, Autism, ADHD, MR, Depression, behavioral disorder p/w suprapubic pressure with last urination at 6AM. Pt called EMS from his facility due to the pain. He states he is not able to go back to his facility with a catheter. Pt states he does not have any SI/HI/hallucinations    Gen:  awake alert   HEENT: normal conjunctiva, oral mucosa moist  Lung: CTAB, no respiratory distress, no wheezes/rhonchi/rales B/L, speaking in full sentences  CV: RRR  Abd: soft, suprapubic TTP, ND, no guarding, no rigidity, no rebound tenderness  MSK: no visible deformities  Skin: Warm, well perfused, no rash, no leg swelling  Psych: normal affect, calm     DDx includes but not limited to: likely urinary retention in setting of UTI vs BPH vs stricture. Will eval for ROSA, lyte derangements    Plan: mckeon placement, labs, reassess symptoms    See Progress Notes for further updates on ED Course

## 2024-11-18 NOTE — H&P ADULT - ATTENDING COMMENTS
34M PMH of Bipolar Disorder, Autism, ADHD, MR, Depression, behavioral disorder requiring previous inpatient psychiatric admissions and constant 1:1 observation, Hypothyroidism, Asthma, DM2, HTN, HLD, BPH with urinary retention, hx testicular cancer s/p b/l orchiectomy admitted for Urinary retention 2/2 BPH and group facility unable to manage mckeon.    #Urinary Retention  Patient presenting with difficulty urinating since last am, 350cc residual volume on bladder scan needs Mckeon insertion  - Initial UOP on mckeon insertion was 240 cc, subsequently another 225cc by 6am  - UA unremarkable   - CT A/P w/IVC 09/2024: no findings on imaging that can be associated to urinary retention, prostate was seen to be within normal limits in size   - Continue home tamsulosin 0.8mg QHS   - As this is the patient's [facility] within past two month for similar instance of urinary retention 2/2 BPH  - Patient should be likely be discharged to different group home that is able to manage mckeon, otherwise will be recurrent admissions.    #Psychiatric Hx  will need constant 1:1 obs  hx of suicidal and homicidal thoughts; however currently not having any of these thoughts  Psych consult by Day team    Agree with Resident's note. Refer to resident's note for detailed plan for chronic comorbidites.

## 2024-11-19 ENCOUNTER — TRANSCRIPTION ENCOUNTER (OUTPATIENT)
Age: 34
End: 2024-11-19

## 2024-11-19 VITALS
RESPIRATION RATE: 16 BRPM | TEMPERATURE: 98 F | DIASTOLIC BLOOD PRESSURE: 58 MMHG | HEART RATE: 74 BPM | OXYGEN SATURATION: 92 % | SYSTOLIC BLOOD PRESSURE: 102 MMHG

## 2024-11-19 LAB
ANION GAP SERPL CALC-SCNC: 4 MMOL/L — LOW (ref 5–17)
BASOPHILS # BLD AUTO: 0.05 K/UL — SIGNIFICANT CHANGE UP (ref 0–0.2)
BASOPHILS NFR BLD AUTO: 0.7 % — SIGNIFICANT CHANGE UP (ref 0–2)
BUN SERPL-MCNC: 13 MG/DL — SIGNIFICANT CHANGE UP (ref 7–23)
CALCIUM SERPL-MCNC: 9 MG/DL — SIGNIFICANT CHANGE UP (ref 8.5–10.1)
CHLORIDE SERPL-SCNC: 105 MMOL/L — SIGNIFICANT CHANGE UP (ref 96–108)
CO2 SERPL-SCNC: 28 MMOL/L — SIGNIFICANT CHANGE UP (ref 22–31)
CREAT SERPL-MCNC: 0.94 MG/DL — SIGNIFICANT CHANGE UP (ref 0.5–1.3)
EGFR: 108 ML/MIN/1.73M2 — SIGNIFICANT CHANGE UP
EOSINOPHIL # BLD AUTO: 0.33 K/UL — SIGNIFICANT CHANGE UP (ref 0–0.5)
EOSINOPHIL NFR BLD AUTO: 4.6 % — SIGNIFICANT CHANGE UP (ref 0–6)
GLUCOSE SERPL-MCNC: 169 MG/DL — HIGH (ref 70–99)
HCT VFR BLD CALC: 42.5 % — SIGNIFICANT CHANGE UP (ref 39–50)
HGB BLD-MCNC: 13.6 G/DL — SIGNIFICANT CHANGE UP (ref 13–17)
IMM GRANULOCYTES NFR BLD AUTO: 1.1 % — HIGH (ref 0–0.9)
LYMPHOCYTES # BLD AUTO: 3.17 K/UL — SIGNIFICANT CHANGE UP (ref 1–3.3)
LYMPHOCYTES # BLD AUTO: 44.5 % — HIGH (ref 13–44)
MAGNESIUM SERPL-MCNC: 1.7 MG/DL — SIGNIFICANT CHANGE UP (ref 1.6–2.6)
MCHC RBC-ENTMCNC: 24.3 PG — LOW (ref 27–34)
MCHC RBC-ENTMCNC: 32 G/DL — SIGNIFICANT CHANGE UP (ref 32–36)
MCV RBC AUTO: 76 FL — LOW (ref 80–100)
MONOCYTES # BLD AUTO: 0.51 K/UL — SIGNIFICANT CHANGE UP (ref 0–0.9)
MONOCYTES NFR BLD AUTO: 7.2 % — SIGNIFICANT CHANGE UP (ref 2–14)
NEUTROPHILS # BLD AUTO: 2.98 K/UL — SIGNIFICANT CHANGE UP (ref 1.8–7.4)
NEUTROPHILS NFR BLD AUTO: 41.9 % — LOW (ref 43–77)
NRBC # BLD: 0 /100 WBCS — SIGNIFICANT CHANGE UP (ref 0–0)
PLATELET # BLD AUTO: 230 K/UL — SIGNIFICANT CHANGE UP (ref 150–400)
POTASSIUM SERPL-MCNC: 4.3 MMOL/L — SIGNIFICANT CHANGE UP (ref 3.5–5.3)
POTASSIUM SERPL-SCNC: 4.3 MMOL/L — SIGNIFICANT CHANGE UP (ref 3.5–5.3)
RBC # BLD: 5.59 M/UL — SIGNIFICANT CHANGE UP (ref 4.2–5.8)
RBC # FLD: 15.4 % — HIGH (ref 10.3–14.5)
SODIUM SERPL-SCNC: 137 MMOL/L — SIGNIFICANT CHANGE UP (ref 135–145)
WBC # BLD: 7.12 K/UL — SIGNIFICANT CHANGE UP (ref 3.8–10.5)
WBC # FLD AUTO: 7.12 K/UL — SIGNIFICANT CHANGE UP (ref 3.8–10.5)

## 2024-11-19 PROCEDURE — 81003 URINALYSIS AUTO W/O SCOPE: CPT

## 2024-11-19 PROCEDURE — 51702 INSERT TEMP BLADDER CATH: CPT

## 2024-11-19 PROCEDURE — 99285 EMERGENCY DEPT VISIT HI MDM: CPT | Mod: 25

## 2024-11-19 PROCEDURE — 99239 HOSP IP/OBS DSCHRG MGMT >30: CPT

## 2024-11-19 PROCEDURE — 80048 BASIC METABOLIC PNL TOTAL CA: CPT

## 2024-11-19 PROCEDURE — 83735 ASSAY OF MAGNESIUM: CPT

## 2024-11-19 PROCEDURE — 82962 GLUCOSE BLOOD TEST: CPT

## 2024-11-19 PROCEDURE — 36415 COLL VENOUS BLD VENIPUNCTURE: CPT

## 2024-11-19 PROCEDURE — 80053 COMPREHEN METABOLIC PANEL: CPT

## 2024-11-19 PROCEDURE — 80061 LIPID PANEL: CPT

## 2024-11-19 PROCEDURE — 99231 SBSQ HOSP IP/OBS SF/LOW 25: CPT

## 2024-11-19 PROCEDURE — 85025 COMPLETE CBC W/AUTO DIFF WBC: CPT

## 2024-11-19 RX ORDER — TAMSULOSIN HYDROCHLORIDE 0.4 MG/1
2 CAPSULE ORAL
Qty: 0 | Refills: 0 | DISCHARGE
Start: 2024-11-19

## 2024-11-19 RX ADMIN — MIRTAZAPINE 15 MILLIGRAM(S): 15 TABLET, FILM COATED ORAL at 11:07

## 2024-11-19 RX ADMIN — Medication 50 MICROGRAM(S): at 05:30

## 2024-11-19 RX ADMIN — PANTOPRAZOLE SODIUM 40 MILLIGRAM(S): 40 TABLET, DELAYED RELEASE ORAL at 05:30

## 2024-11-19 RX ADMIN — RISPERIDONE 4 MILLIGRAM(S): 4 TABLET ORAL at 05:30

## 2024-11-19 RX ADMIN — ENOXAPARIN SODIUM 40 MILLIGRAM(S): 30 INJECTION SUBCUTANEOUS at 05:30

## 2024-11-19 RX ADMIN — TESTOSTERONE 50 MILLIGRAM(S): 16.2 GEL TRANSDERMAL at 11:08

## 2024-11-19 RX ADMIN — CETIRIZINE HYDROCHLORIDE 10 MILLIGRAM(S): 10 TABLET ORAL at 11:07

## 2024-11-19 RX ADMIN — Medication 2: at 12:05

## 2024-11-19 RX ADMIN — Medication 500 MILLIGRAM(S): at 11:08

## 2024-11-19 NOTE — PROGRESS NOTE ADULT - ASSESSMENT
34M PMH of Bipolar Disorder, Autism, ADHD, MR, Depression, behavioral disorder requiring previous inpatient psychiatric admissions and constant 1:1 observation, Hypothyroidism, Asthma, DM2, HTN, HLD, BPH with urinary retention, hx testicular cancer s/p b/l orchiectomy admitted for the treatment of urinary retention.       Problem/Plan - 1:  ·  Problem: Urinary retention.   ·  Plan: Patient presenting with difficulty urinating since last am, 350cc residual volume on bladder scan needs Mckeon insertion  - Initial UOP on mckeon insertion was 240 cc, subsequently another 225cc by 6am  - UA unremarkable   - CT A/P w/IVC 09/2024: no findings on imaging that can be associated to urinary retention, prostate was seen to be within normal limits in size   - Continue home tamsulosin 0.8mg QHS   - Urology consulted.   TOV yesterday.  Pt. has been void well since removal of mckeon catheter.   - f/u with outpatient urologist Dr. Jack Paul.     Problem/Plan - 2:  ·  Problem: Major depression.  ·  Plan: - chronic  - recently increased depression, attempt to self harm the LUE before arriving to the ED  - 1:1 observation  - Psych f/u  - Continue Mirtazapine in the interim.     Problem/Plan - 3:  ·  Problem: Benign prostatic hyperplasia.  ·  Plan: Continue home tamsulosin.     Problem/Plan - 4:  ·  Problem: Bipolar disorder.  ·  Plan: Continue Depakote; Continue Risperdal for behavioral disorder, agitation.     Problem/Plan - 5:  ·  Problem: ADHD.  ·  Plan: Continue clonidine.     Problem/Plan - 6:  ·  Problem: T2DM (type 2 diabetes mellitus).   ·  Plan: Takes metformin at home  - Hold home medication  - Start LDISS  - POCT [facility] QHS, Hypoglycemia protocol   - A1C 7.2 09/2024.     Problem/Plan - 7:  ·  Problem: Hypothyroidism.   ·  Plan: Continue home levothyroxine.     Problem/Plan - 8:  ·  Problem: HLD (hyperlipidemia).   ·  Plan: Continue home atorvastatin.     Problem/Plan - 9:  ·  Problem: GERD (gastroesophageal reflux disease).   ·  Plan: Continue home protonix.     Problem/Plan - 10:  ·  Problem: Testicular cancer.   ·  Plan; - S/P B/L orchiectomy  - Continue home testosterone transdermal gel.     Problem/Plan - 11:  ·  Problem: Need for prophylactic measure.   ·  Plan: DVT PPx: Lovenox 40mg QD.      
34M PMH of Bipolar Disorder, Autism, ADHD, MR, Depression, behavioral disorder requiring previous inpatient psychiatric admissions and constant 1:1 observation, Hypothyroidism, Asthma, DM2, HTN, HLD, BPH with urinary retention, hx testicular cancer s/p b/l orchiectomy admitted for the treatment of urinary retention.       Problem/Plan - 1:  ·  Problem: Urinary retention.   ·  Plan: Patient presenting with difficulty urinating since last am, 350cc residual volume on bladder scan needs Mckeon insertion  - Initial UOP on mckeon insertion was 240 cc, subsequently another 225cc by 6am  - UA unremarkable   - CT A/P w/IVC 09/2024: no findings on imaging that can be associated to urinary retention, prostate was seen to be within normal limits in size   - Continue home tamsulosin 0.8mg QHS   - Urology consulted.  Pt's outpatient urologist is Dr. Jack Paul.     Problem/Plan - 2:  ·  Problem: Major depression.  ·  Plan: - chronic  - recently increased depression, attempt to self harm the LUE before arriving to the ED  - 1:1 observation  - Psych consult   - Continue Mirtazapine in the interim.     Problem/Plan - 3:  ·  Problem: Benign prostatic hyperplasia.  ·  Plan: Continue home tamsulosin.     Problem/Plan - 4:  ·  Problem: Bipolar disorder.  ·  Plan: Continue Depakote; Continue Risperdal for behavioral disorder, agitation.     Problem/Plan - 5:  ·  Problem: ADHD.  ·  Plan: Continue clonidine.     Problem/Plan - 6:  ·  Problem: T2DM (type 2 diabetes mellitus).   ·  Plan: Takes metformin at home  - Hold home medication  - Start LDISS  - POCT QAC QHS, Hypoglycemia protocol   - A1C 7.2 09/2024.     Problem/Plan - 7:  ·  Problem: Hypothyroidism.   ·  Plan: Continue home levothyroxine.     Problem/Plan - 8:  ·  Problem: HLD (hyperlipidemia).   ·  Plan: Continue home atorvastatin.     Problem/Plan - 9:  ·  Problem: GERD (gastroesophageal reflux disease).   ·  Plan: Continue home protonix.     Problem/Plan - 10:  ·  Problem: Testicular cancer.   ·  Plan; - S/P B/L orchiectomy  - Continue home testosterone transdermal gel.     Problem/Plan - 11:  ·  Problem: Need for prophylactic measure.   ·  Plan: DVT PPx: Lovenox 40mg QD.

## 2024-11-19 NOTE — BH CONSULTATION LIAISON PROGRESS NOTE - NSBHCHARTREVIEWLAB_PSY_A_CORE FT
13.6   7.12  )-----------( 230      ( 19 Nov 2024 08:10 )             42.5   11-19    137  |  105  |  13  ----------------------------<  169[H]  4.3   |  28  |  0.94    Ca    9.0      19 Nov 2024 08:10  Mg     1.7     11-19    TPro  6.0  /  Alb  2.9[L]  /  TBili  0.2  /  DBili  x   /  AST  12[L]  /  ALT  35  /  AlkPhos  76  11-18

## 2024-11-19 NOTE — PROGRESS NOTE ADULT - SUBJECTIVE AND OBJECTIVE BOX
CHIEF COMPLAINT/INTERVAL HISTORY:  Pt. seen and evaluated for urinary retention.  Pt. is in no distress.  Sitting in chair eating breakfast.  Reports that he has been voiding well.  States he voided a large amount this morning.  Denies having any pain.      REVIEW OF SYSTEMS:  No fever, CP, SOB, or abdominal pain.      Vital Signs Last 24 Hrs  T(C): 36.9 (19 Nov 2024 05:25), Max: 36.9 (18 Nov 2024 19:59)  T(F): 98.5 (19 Nov 2024 05:25), Max: 98.5 (19 Nov 2024 05:25)  HR: 68 (19 Nov 2024 05:25) (68 - 70)  BP: 96/56 (19 Nov 2024 05:25) (96/56 - 104/64)  BP(mean): --  RR: 18 (19 Nov 2024 05:25) (17 - 18)  SpO2: 94% (19 Nov 2024 05:25) (92% - 94%)    Parameters below as of 19 Nov 2024 05:25  Patient On (Oxygen Delivery Method): room air        PHYSICAL EXAM:  GENERAL: NAD  HEENT: EOMI, hearing normal, conjunctiva and sclera clear  Chest: CTA bilaterally, no wheezing  CV: S1S2, RRR,   GI: soft, +BS, NT/ND  Musculoskeletal: no LE edema  Psychiatric: calm  Skin: warm and dry, fresh puncture marks on LUE    LABS:                        12.2   8.17  )-----------( 211      ( 18 Nov 2024 05:43 )             38.6     11-18    138  |  103  |  12  ----------------------------<  180[H]  4.1   |  26  |  0.87    Ca    8.6      18 Nov 2024 05:43    TPro  6.0  /  Alb  2.9[L]  /  TBili  0.2  /  DBili  x   /  AST  12[L]  /  ALT  35  /  AlkPhos  76  11-18      Urinalysis Basic - ( 18 Nov 2024 05:43 )    Color: x / Appearance: x / SG: x / pH: x  Gluc: 180 mg/dL / Ketone: x  / Bili: x / Urobili: x   Blood: x / Protein: x / Nitrite: x   Leuk Esterase: x / RBC: x / WBC x   Sq Epi: x / Non Sq Epi: x / Bacteria: x

## 2024-11-19 NOTE — BH CONSULTATION LIAISON PROGRESS NOTE - NSBHFUPINTERVALHXFT_PSY_A_CORE
Patient seen, evaluated and chart reviewed. Patient has reportedly had his urinary retention resolved and he is now scheduled for discharge. Patient reports feeling better and in good mood. He requests discharge back to the group home. No evidence of depression, everett or psychosis.

## 2024-11-19 NOTE — DISCHARGE NOTE NURSING/CASE MANAGEMENT/SOCIAL WORK - NSDCVIVACCINE_GEN_ALL_CORE_FT
Tdap; 20-Dec-2018 12:21; Lo Anaya (RN); [employer]; [id number] (Exp. Date: 02-Nov-2020); IntraMuscular; Deltoid Left.; 0.5 milliLiter(s); VIS (VIS Published: 09-May-2013, VIS Presented: 20-Dec-2018);   Tdap; 26-Mar-2019 18:59; Shavon Barrios (RN); [employer]; [id number] (Exp. Date: 26-Feb-2021); IntraMuscular; Deltoid Left.; 0.5 milliLiter(s); VIS (VIS Published: 09-May-2013, VIS Presented: 26-Mar-2019);   Tdap; 01-Dec-2021 09:35; Zamzam Coulter (RN); [employer]; [id number] (Exp. Date: 09-Sep-2023); IntraMuscular; Deltoid Left.; 0.5 milliLiter(s); VIS (VIS Published: 09-May-2013, VIS Presented: 01-Dec-2021);   Tdap; 21-Jul-2022 01:28; Rose Hancock (RN); [employer]; [id number] (Exp. Date: 14-Oct-2024); IntraMuscular; Deltoid Right.; 0.5 milliLiter(s); VIS (VIS Published: 09-May-2013, VIS Presented: 21-Jul-2022);   Tdap; 12-Jun-2023 21:01; Diann Paul (RN); [employer]; [id number] (Exp. Date: 08-Mar-2025); IntraMuscular; Deltoid Left.; 0.5 milliLiter(s); VIS (VIS Published: 09-May-2013, VIS Presented: 12-Jun-2023);   Tdap; 24-Jul-2023 23:01; Diann Paul (RN); [employer]; [id number] (Exp. Date: 18-Aug-2025); IntraMuscular; Deltoid Right.; 0.5 milliLiter(s); VIS (VIS Published: 09-May-2013, VIS Presented: 24-Jul-2023);   Tdap; 09-Jun-2024 22:20; Dyllan Arevalo (RN); [employer]; [id number] (Exp. Date: 21-Nov-2024); IntraMuscular; Deltoid Right.; 0.5 milliLiter(s); VIS (VIS Published: 09-May-2013, VIS Presented: 09-Jun-2024);   Tdap; 24-Sep-2024 18:28; Luba Crocker (RN); [employer]; [id number] (Exp. Date: 31-May-2026); IntraMuscular; Deltoid Left.; 0.5 milliLiter(s); VIS (VIS Published: 09-May-2013, VIS Presented: 24-Sep-2024);

## 2024-11-19 NOTE — BH CONSULTATION LIAISON PROGRESS NOTE - CURRENT MEDICATION
Verified that supplies are being supplied by [facility] and was told order shipped on 11/1/22.    She has not received package yet.    She is aware she needs to request refills when down to her last sensor.       MEDICATIONS  (STANDING):  atorvastatin 10 milliGRAM(s) Oral at bedtime  busPIRone 15 milliGRAM(s) Oral <User Schedule>  cetirizine 10 milliGRAM(s) Oral daily  chlorhexidine 0.12% Liquid 15 milliLiter(s) Swish and Spit two times a day  cloNIDine 0.3 milliGRAM(s) Oral every 12 hours  dextrose 5%. 1000 milliLiter(s) (100 mL/Hr) IV Continuous <Continuous>  dextrose 5%. 1000 milliLiter(s) (50 mL/Hr) IV Continuous <Continuous>  dextrose 50% Injectable 25 Gram(s) IV Push once  dextrose 50% Injectable 12.5 Gram(s) IV Push once  dextrose 50% Injectable 25 Gram(s) IV Push once  divalproex  milliGRAM(s) Oral daily  divalproex ER 1000 milliGRAM(s) Oral at bedtime  enoxaparin Injectable 40 milliGRAM(s) SubCutaneous every 24 hours  glucagon  Injectable 1 milliGRAM(s) IntraMuscular once  influenza   Vaccine 0.5 milliLiter(s) IntraMuscular once  insulin lispro (ADMELOG) corrective regimen sliding scale   SubCutaneous three times a day before meals  insulin lispro (ADMELOG) corrective regimen sliding scale   SubCutaneous at bedtime  levothyroxine 50 MICROGram(s) Oral daily  mirtazapine 22.5 milliGRAM(s) Oral at bedtime  mirtazapine 15 milliGRAM(s) Oral daily  pantoprazole    Tablet 40 milliGRAM(s) Oral before breakfast  risperiDONE   Tablet 4 milliGRAM(s) Oral two times a day  senna 2 Tablet(s) Oral at bedtime  tamsulosin 0.8 milliGRAM(s) Oral at bedtime  testosterone 1% Gel 50 milliGRAM(s) Topical daily    MEDICATIONS  (PRN):  acetaminophen     Tablet .. 650 milliGRAM(s) Oral every 6 hours PRN Temp greater or equal to 38C (100.4F), Mild Pain (1 - 3)  aluminum hydroxide/magnesium hydroxide/simethicone Suspension 30 milliLiter(s) Oral every 4 hours PRN Dyspepsia  dextrose Oral Gel 15 Gram(s) Oral once PRN Blood Glucose LESS THAN 70 milliGRAM(s)/deciliter  melatonin 3 milliGRAM(s) Oral at bedtime PRN Insomnia  ondansetron Injectable 4 milliGRAM(s) IV Push every 8 hours PRN Nausea and/or Vomiting

## 2024-11-19 NOTE — DISCHARGE NOTE PROVIDER - NSDCMRMEDTOKEN_GEN_ALL_CORE_FT
atorvastatin 10 mg oral tablet: 1 tab(s) orally once a day (in the evening)  busPIRone 15 mg oral tablet: 1 tab(s) orally once a day (in the evening)  chlorhexidine 0.12% mucous membrane liquid: 15 milliliter(s) orally  cloNIDine 0.3 mg oral tablet: 1 tab(s) orally every 12 hours  divalproex sodium 500 mg oral tablet, extended release: 2 tab(s) orally once a day (at bedtime)  divalproex sodium 500 mg oral tablet, extended release: 1 tab(s) orally once a day (in the afternoon) at 2pm  levothyroxine 50 mcg (0.05 mg) oral tablet: 1 tab(s) orally once a day  loratadine 10 mg oral capsule: 1 cap(s) orally  metFORMIN 1000 mg oral tablet: 1 tab(s) orally 2 times a day (with meals)  mirtazapine 15 mg oral tablet: 1.5 tab(s) orally once a day (at bedtime)  Protonix 20 mg oral delayed release tablet: 1 tab(s) orally once a day  Remeron 15 mg oral tablet: 1 tab(s) orally  risperiDONE 4 mg oral tablet: 1 tab(s) orally 2 times a day at 2pm &amp; 8pm  senna (sennosides) 8.6 mg oral tablet: 2 tab(s) orally once a day (at bedtime)  tamsulosin 0.4 mg oral capsule: 2 cap(s) orally once a day (at bedtime)  testosterone 50 mg/5 g (1%) transdermal gel: 1 packet(s) transdermally once a day   atorvastatin 10 mg oral tablet: 1 tab(s) orally once a day (in the evening)  busPIRone 15 mg oral tablet: 1 tab(s) orally once a day (in the evening)  chlorhexidine 0.12% mucous membrane liquid: 15 milliliter(s) orally 2 times a day  cloNIDine 0.3 mg oral tablet: 1 tab(s) orally every 12 hours  divalproex sodium 500 mg oral tablet, extended release: 2 tab(s) orally once a day (at bedtime)  divalproex sodium 500 mg oral tablet, extended release: 1 tab(s) orally once a day (in the afternoon) at 2pm  levothyroxine 50 mcg (0.05 mg) oral tablet: 1 tab(s) orally once a day  loratadine 10 mg oral capsule: 1 cap(s) orally  metFORMIN 1000 mg oral tablet: 1 tab(s) orally 2 times a day (with meals)  mirtazapine 15 mg oral tablet: 1.5 tab(s) orally once a day (at bedtime)  Protonix 20 mg oral delayed release tablet: 1 tab(s) orally once a day  Remeron 15 mg oral tablet: 1 tab(s) orally once a day  risperiDONE 4 mg oral tablet: 1 tab(s) orally 2 times a day at 2pm &amp; 8pm  senna (sennosides) 8.6 mg oral tablet: 2 tab(s) orally once a day (at bedtime)  tamsulosin 0.4 mg oral capsule: 2 cap(s) orally once a day (at bedtime)  testosterone 50 mg/5 g (1%) transdermal gel: 1 packet(s) transdermally once a day

## 2024-11-19 NOTE — BH CONSULTATION LIAISON PROGRESS NOTE - NSBHCHARTREVIEWVS_PSY_A_CORE FT
Vital Signs Last 24 Hrs  T(C): 36.7 (19 Nov 2024 11:56), Max: 36.9 (18 Nov 2024 19:59)  T(F): 98 (19 Nov 2024 11:56), Max: 98.5 (19 Nov 2024 05:25)  HR: 74 (19 Nov 2024 11:56) (68 - 74)  BP: 102/58 (19 Nov 2024 11:56) (96/56 - 104/64)  BP(mean): --  RR: 16 (19 Nov 2024 11:56) (16 - 18)  SpO2: 92% (19 Nov 2024 11:56) (92% - 94%)    Parameters below as of 19 Nov 2024 11:56  Patient On (Oxygen Delivery Method): room air

## 2024-11-19 NOTE — DISCHARGE NOTE NURSING/CASE MANAGEMENT/SOCIAL WORK - PATIENT PORTAL LINK FT
You can access the FollowMyHealth Patient Portal offered by [facility] by registering at the following website: [email or web address]. By joining [employer]’s FollowMyHealth portal, you will also be able to view your health information using other applications (apps) compatible with our system.

## 2024-11-19 NOTE — DISCHARGE NOTE PROVIDER - HOSPITAL COURSE
34M PMH of Bipolar Disorder, Autism, ADHD, MR, Depression, behavioral disorder requiring previous inpatient psychiatric admissions and constant 1:1 observation, Hypothyroidism, Asthma, DM2, HTN, HLD, BPH with urinary retention, hx testicular cancer s/p b/l orchiectomy B[facility]EMS from [facility] resides at [facility] in [place] comes in  for urinary retention. States last urinated last morning and been unable to urinate since then.  Reports some suprapubic abdominal pressure.  Denies any nausea, vomiting, diarrhea, or fevers.  History of frequent visits for urinary retention.  Last admitted 11/9/24 -11/12/24 for urinary retention. Of note, pt had been here multiple times for same issue, mckeon was placed,  TOV was completed and discharged back to his group home; pt's group home does not have the resources to take care and manage the mckeon cath.     Patient states that he is more stressed and depressed than before. Has LUE puncture marks from self-harm episode that he did 1/2 hr before coming to ED. Currently does not have any thought of suicidal or homicidal thoughts.       ED Course:   Vitals: BP: 127/90, HR: 110, Temp: 98.1, RR: 18, SpO2: 96% on RA   Labs:  CBC generally WNL, hemolyzed specimen for CMP with BUN/Cr 15/1.10  UA (-)  Received in the ED: 1L NS Bolus    Pt. was admitted with psychiatry and urology consultation.  He was continued on his flomax.  Pt. had TOV and has been able to void without difficulty. 34M PMH of Bipolar Disorder, Autism, ADHD, MR, Depression, behavioral disorder requiring previous inpatient psychiatric admissions and constant 1:1 observation, Hypothyroidism, Asthma, DM2, HTN, HLD, BPH with urinary retention, hx testicular cancer s/p b/l orchiectomy BIBEMS from [facility] resides at [facility] in [place] comes in  for urinary retention. States last urinated last morning and been unable to urinate since then.  Reports some suprapubic abdominal pressure.  Denies any nausea, vomiting, diarrhea, or fevers.  History of frequent visits for urinary retention.  Last admitted 11/9/24 -11/12/24 for urinary retention. Of note, pt had been here multiple times for same issue, mckeon was placed,  TOV was completed and discharged back to his group home; pt's group home does not have the resources to take care and manage the mckeon cath.     Patient states that he is more stressed and depressed than before. Has LUE puncture marks from self-harm episode that he did 1/2 hr before coming to ED. Currently does not have any thought of suicidal or homicidal thoughts.       ED Course:   Vitals: BP: 127/90, HR: 110, Temp: 98.1, RR: 18, SpO2: 96% on RA   Labs:  CBC generally WNL, hemolyzed specimen for CMP with BUN/Cr 15/1.10  UA (-)  Received in the ED: 1L NS Bolus    Pt. was admitted with psychiatry and urology consultation.  He was continued on his flomax.  Pt. had TOV and has been able to void without difficulty.  He was assessed by psychiatry and felt that there was no psychiatric contraindications to discharge.      On day of discharge patient is in no distress.  Afebrile and hemodynamically stable.  Voiding without difficulty.

## 2024-11-19 NOTE — DISCHARGE NOTE PROVIDER - CARE PROVIDER_API CALL
Marco Paul  Urology  [address]  [place] [address]  Phone: [phone]  Fax: [phone]  Follow Up Time: 1 week

## 2024-11-19 NOTE — DISCHARGE NOTE NURSING/CASE MANAGEMENT/SOCIAL WORK - FINANCIAL ASSISTANCE
[facility] provides services at a reduced cost to those who are determined to be eligible through [facility]’s financial assistance program. Information regarding [facility]’s financial assistance program can be found by going to [email or web address] or by calling [phone].

## 2024-11-19 NOTE — BH CONSULTATION LIAISON PROGRESS NOTE - NSBHCONSULTFOLLOWAFTERCARE_PSY_A_CORE FT
Pupils equal, round and reactive to light, Extra-ocular movement intact, eyes are clear b/l
F-up outpatient.

## 2024-11-19 NOTE — DISCHARGE NOTE PROVIDER - NSDCFUSCHEDAPPT_GEN_ALL_CORE_FT
[facility]  [address]  Scheduled Appointment: 01/13/2025    Gabi Watson  [facility]  [facility]  Scheduled Appointment: 01/13/2025

## 2024-11-20 ENCOUNTER — INPATIENT (INPATIENT)
Facility: HOSPITAL | Age: 34
LOS: 8 days | Discharge: NURSING, REHAB OR LONG-TERM CARE | DRG: 501 | End: 2024-11-29
Attending: INTERNAL MEDICINE | Admitting: STUDENT IN AN ORGANIZED HEALTH CARE EDUCATION/TRAINING PROGRAM
Payer: MEDICAID

## 2024-11-20 VITALS
HEIGHT: 69 IN | RESPIRATION RATE: 17 BRPM | DIASTOLIC BLOOD PRESSURE: 68 MMHG | TEMPERATURE: 99 F | OXYGEN SATURATION: 97 % | HEART RATE: 88 BPM | SYSTOLIC BLOOD PRESSURE: 125 MMHG

## 2024-11-20 DIAGNOSIS — Z90.79 ACQUIRED ABSENCE OF OTHER GENITAL ORGAN(S): Chronic | ICD-10-CM

## 2024-11-20 LAB
AMPHET UR-MCNC: NEGATIVE — SIGNIFICANT CHANGE UP
APPEARANCE UR: CLEAR — SIGNIFICANT CHANGE UP
BARBITURATES UR SCN-MCNC: NEGATIVE — SIGNIFICANT CHANGE UP
BENZODIAZ UR-MCNC: NEGATIVE — SIGNIFICANT CHANGE UP
BILIRUB UR-MCNC: NEGATIVE — SIGNIFICANT CHANGE UP
COCAINE METAB.OTHER UR-MCNC: NEGATIVE — SIGNIFICANT CHANGE UP
COLOR SPEC: YELLOW — SIGNIFICANT CHANGE UP
DIFF PNL FLD: NEGATIVE — SIGNIFICANT CHANGE UP
FENTANYL UR QL SCN: NEGATIVE — SIGNIFICANT CHANGE UP
FLUAV AG NPH QL: SIGNIFICANT CHANGE UP
FLUBV AG NPH QL: SIGNIFICANT CHANGE UP
GLUCOSE UR QL: 500 MG/DL
KETONES UR-MCNC: NEGATIVE MG/DL — SIGNIFICANT CHANGE UP
LEUKOCYTE ESTERASE UR-ACNC: NEGATIVE — SIGNIFICANT CHANGE UP
METHADONE UR-MCNC: NEGATIVE — SIGNIFICANT CHANGE UP
NITRITE UR-MCNC: NEGATIVE — SIGNIFICANT CHANGE UP
OPIATES UR-MCNC: NEGATIVE — SIGNIFICANT CHANGE UP
PCP SPEC-MCNC: SIGNIFICANT CHANGE UP
PCP UR-MCNC: NEGATIVE — SIGNIFICANT CHANGE UP
PH UR: 6.5 — SIGNIFICANT CHANGE UP (ref 5–8)
PROT UR-MCNC: NEGATIVE MG/DL — SIGNIFICANT CHANGE UP
RSV RNA NPH QL NAA+NON-PROBE: SIGNIFICANT CHANGE UP
SARS-COV-2 RNA SPEC QL NAA+PROBE: SIGNIFICANT CHANGE UP
SP GR SPEC: 1.01 — SIGNIFICANT CHANGE UP (ref 1–1.03)
THC UR QL: NEGATIVE — SIGNIFICANT CHANGE UP
UROBILINOGEN FLD QL: 0.2 MG/DL — SIGNIFICANT CHANGE UP (ref 0.2–1)

## 2024-11-20 PROCEDURE — 99285 EMERGENCY DEPT VISIT HI MDM: CPT

## 2024-11-20 NOTE — ED PROVIDER NOTE - PHYSICAL EXAMINATION
Constitutional: NAD, alert, verbal  HEENT: NCAT, EOMi, PERRL  Cardiac: RRR no MRG  Resp: clear, no wheezing or crackles  GI: mild suprapubic ttp, no r/g  Ext: no edema  Neuro: SALAS  Skin: No rashes

## 2024-11-20 NOTE — ED PROVIDER NOTE - CLINICAL SUMMARY MEDICAL DECISION MAKING FREE TEXT BOX
35 y/o M with PMHx of Autism, testicular CA s/p orchiectomy, DM2, Impulse control disorder, HLD, BPH, hypothyroid, Factitious disorder, GERD, asthma, Intellectual disability presents to the ED c/o inabilty to urinate for last few hours. Pt states he requires intermittent catheterization. States he is not able to go home with mckeon so he has to be admitted to medicine. Pt admitted at [facility] 2 days ago then dc'd back to facility. Pt brought in by facility aide noted he was aggressive at facility also threatening to harm himself and others. Pt denies this on exam. ROS otherwise negative. Well appearing on exam, +suprapubic ttp.   Plan on labs, urine to r/o infection, bladder scan r/o retention, psych consult when medically cleared.

## 2024-11-20 NOTE — ED PROVIDER NOTE - CARE PLAN
1 Principal Discharge DX:	Acute on chronic urinary retention  Secondary Diagnosis:	Anxiety with agitation

## 2024-11-20 NOTE — ED ADULT TRIAGE NOTE - CHIEF COMPLAINT QUOTE
CEM and SCPD 7623 from group home complaining of suicidal ideations. Reports he was stressed b/c he was having trouble urinating so he started to bite his arm. States "when I am stressed out I hurt myself I can't help it." Denies SI/HI., tactile/ visual/ auditory hallucinations. Bite wounds present to left forearm. 1:1 initiated in triage for safety measures. Pt calm and cooperative. PMH depression, bipolar disorder. CEM and SCPD 7623 from group home complaining of suicidal ideations. Reports he was stressed b/c he was having trouble urinating so he started to bite his arm. States "when I am stressed out I hurt myself I can't help it." Denies SI/HI., tactile/ visual/ auditory hallucinations. Bite wounds present to left forearm, no active bleeding. 1:1 initiated in triage for safety measures. Pt calm and cooperative. PMH depression, bipolar disorder. CEM and SCPD 6771 from group home complaining of suicidal ideations. Reports he was stressed b/c he was having trouble urinating so he started to bite his arm. States "when I am stressed out I hurt myself I can't help it." Denies SI/HI., tactile/ visual/ auditory hallucinations. Bite wounds present to left forearm, no active bleeding. 1:1 initiated in triage for safety measures. Pt calm and cooperative. Aide from group home on the way. PMH depression, bipolar disorder.

## 2024-11-20 NOTE — ED PROVIDER NOTE - PROGRESS NOTE DETAILS
KIRILLG: Received signout from Dr. Coronado to follow up labs and admit to medicine.  Admitted to hospitalist with psych and urology consults.

## 2024-11-21 DIAGNOSIS — R33.9 RETENTION OF URINE, UNSPECIFIED: ICD-10-CM

## 2024-11-21 LAB
ALBUMIN SERPL ELPH-MCNC: 3.1 G/DL — LOW (ref 3.3–5)
ALP SERPL-CCNC: 67 U/L — SIGNIFICANT CHANGE UP (ref 40–120)
ALT FLD-CCNC: 32 U/L — SIGNIFICANT CHANGE UP (ref 12–78)
ANION GAP SERPL CALC-SCNC: 3 MMOL/L — LOW (ref 5–17)
APAP SERPL-MCNC: <2 UG/ML — LOW (ref 10–30)
AST SERPL-CCNC: 12 U/L — LOW (ref 15–37)
BASOPHILS # BLD AUTO: 0.04 K/UL — SIGNIFICANT CHANGE UP (ref 0–0.2)
BASOPHILS NFR BLD AUTO: 0.5 % — SIGNIFICANT CHANGE UP (ref 0–2)
BILIRUB SERPL-MCNC: 0.2 MG/DL — SIGNIFICANT CHANGE UP (ref 0.2–1.2)
BUN SERPL-MCNC: 13 MG/DL — SIGNIFICANT CHANGE UP (ref 7–23)
CALCIUM SERPL-MCNC: 9.1 MG/DL — SIGNIFICANT CHANGE UP (ref 8.5–10.1)
CHLORIDE SERPL-SCNC: 102 MMOL/L — SIGNIFICANT CHANGE UP (ref 96–108)
CO2 SERPL-SCNC: 30 MMOL/L — SIGNIFICANT CHANGE UP (ref 22–31)
CREAT SERPL-MCNC: 1.09 MG/DL — SIGNIFICANT CHANGE UP (ref 0.5–1.3)
EGFR: 91 ML/MIN/1.73M2 — SIGNIFICANT CHANGE UP
EOSINOPHIL # BLD AUTO: 0.31 K/UL — SIGNIFICANT CHANGE UP (ref 0–0.5)
EOSINOPHIL NFR BLD AUTO: 3.6 % — SIGNIFICANT CHANGE UP (ref 0–6)
ETHANOL SERPL-MCNC: <10 MG/DL — SIGNIFICANT CHANGE UP (ref 0–10)
GLUCOSE BLDC GLUCOMTR-MCNC: 165 MG/DL — HIGH (ref 70–99)
GLUCOSE BLDC GLUCOMTR-MCNC: 178 MG/DL — HIGH (ref 70–99)
GLUCOSE BLDC GLUCOMTR-MCNC: 181 MG/DL — HIGH (ref 70–99)
GLUCOSE SERPL-MCNC: 198 MG/DL — HIGH (ref 70–99)
HCT VFR BLD CALC: 39.1 % — SIGNIFICANT CHANGE UP (ref 39–50)
HGB BLD-MCNC: 12.1 G/DL — LOW (ref 13–17)
IMM GRANULOCYTES NFR BLD AUTO: 1.2 % — HIGH (ref 0–0.9)
LYMPHOCYTES # BLD AUTO: 2.78 K/UL — SIGNIFICANT CHANGE UP (ref 1–3.3)
LYMPHOCYTES # BLD AUTO: 32.5 % — SIGNIFICANT CHANGE UP (ref 13–44)
MCHC RBC-ENTMCNC: 24 PG — LOW (ref 27–34)
MCHC RBC-ENTMCNC: 30.9 G/DL — LOW (ref 32–36)
MCV RBC AUTO: 77.4 FL — LOW (ref 80–100)
MONOCYTES # BLD AUTO: 0.78 K/UL — SIGNIFICANT CHANGE UP (ref 0–0.9)
MONOCYTES NFR BLD AUTO: 9.1 % — SIGNIFICANT CHANGE UP (ref 2–14)
NEUTROPHILS # BLD AUTO: 4.54 K/UL — SIGNIFICANT CHANGE UP (ref 1.8–7.4)
NEUTROPHILS NFR BLD AUTO: 53.1 % — SIGNIFICANT CHANGE UP (ref 43–77)
PLATELET # BLD AUTO: 222 K/UL — SIGNIFICANT CHANGE UP (ref 150–400)
POTASSIUM SERPL-MCNC: 4.3 MMOL/L — SIGNIFICANT CHANGE UP (ref 3.5–5.3)
POTASSIUM SERPL-SCNC: 4.3 MMOL/L — SIGNIFICANT CHANGE UP (ref 3.5–5.3)
PROT SERPL-MCNC: 6.4 GM/DL — SIGNIFICANT CHANGE UP (ref 6–8.3)
RBC # BLD: 5.05 M/UL — SIGNIFICANT CHANGE UP (ref 4.2–5.8)
RBC # FLD: 15.3 % — HIGH (ref 10.3–14.5)
SALICYLATES SERPL-MCNC: <1.7 MG/DL — LOW (ref 2.8–20)
SODIUM SERPL-SCNC: 135 MMOL/L — SIGNIFICANT CHANGE UP (ref 135–145)
WBC # BLD: 8.55 K/UL — SIGNIFICANT CHANGE UP (ref 3.8–10.5)
WBC # FLD AUTO: 8.55 K/UL — SIGNIFICANT CHANGE UP (ref 3.8–10.5)

## 2024-11-21 PROCEDURE — 97163 PT EVAL HIGH COMPLEX 45 MIN: CPT | Mod: GP

## 2024-11-21 PROCEDURE — 97116 GAIT TRAINING THERAPY: CPT | Mod: GP

## 2024-11-21 PROCEDURE — 90792 PSYCH DIAG EVAL W/MED SRVCS: CPT

## 2024-11-21 PROCEDURE — 93010 ELECTROCARDIOGRAM REPORT: CPT

## 2024-11-21 PROCEDURE — 97530 THERAPEUTIC ACTIVITIES: CPT | Mod: GP

## 2024-11-21 PROCEDURE — 82962 GLUCOSE BLOOD TEST: CPT

## 2024-11-21 PROCEDURE — 99223 1ST HOSP IP/OBS HIGH 75: CPT

## 2024-11-21 RX ORDER — RISPERIDONE 4 MG/1
4 TABLET ORAL
Refills: 0 | Status: DISCONTINUED | OUTPATIENT
Start: 2024-11-21 | End: 2024-11-29

## 2024-11-21 RX ORDER — CLONIDINE HYDROCHLORIDE 0.3 MG/1
0.3 TABLET ORAL EVERY 12 HOURS
Refills: 0 | Status: DISCONTINUED | OUTPATIENT
Start: 2024-11-21 | End: 2024-11-26

## 2024-11-21 RX ORDER — DIVALPROEX SODIUM 500 MG
500 TABLET, DELAYED RELEASE (ENTERIC COATED) ORAL DAILY
Refills: 0 | Status: DISCONTINUED | OUTPATIENT
Start: 2024-11-21 | End: 2024-11-29

## 2024-11-21 RX ORDER — LIDOCAINE 40 MG/G
1 CREAM TOPICAL AT BEDTIME
Refills: 0 | Status: DISCONTINUED | OUTPATIENT
Start: 2024-11-21 | End: 2024-11-29

## 2024-11-21 RX ORDER — ONDANSETRON HYDROCHLORIDE 4 MG/1
4 TABLET, FILM COATED ORAL EVERY 6 HOURS
Refills: 0 | Status: DISCONTINUED | OUTPATIENT
Start: 2024-11-21 | End: 2024-11-29

## 2024-11-21 RX ORDER — CHLORHEXIDINE GLUCONATE 40 MG/ML
15 SOLUTION TOPICAL
Qty: 0 | Refills: 0 | DISCHARGE

## 2024-11-21 RX ORDER — LORAZEPAM 2 MG/1
1 TABLET ORAL EVERY 4 HOURS
Refills: 0 | Status: DISCONTINUED | OUTPATIENT
Start: 2024-11-21 | End: 2024-11-21

## 2024-11-21 RX ORDER — ENOXAPARIN SODIUM 30 MG/.3ML
40 INJECTION SUBCUTANEOUS EVERY 24 HOURS
Refills: 0 | Status: DISCONTINUED | OUTPATIENT
Start: 2024-11-21 | End: 2024-11-25

## 2024-11-21 RX ORDER — GLUCAGON INJECTION, SOLUTION 0.5 MG/.1ML
1 INJECTION, SOLUTION SUBCUTANEOUS ONCE
Refills: 0 | Status: DISCONTINUED | OUTPATIENT
Start: 2024-11-21 | End: 2024-11-29

## 2024-11-21 RX ORDER — PANTOPRAZOLE SODIUM 40 MG/1
40 TABLET, DELAYED RELEASE ORAL
Refills: 0 | Status: DISCONTINUED | OUTPATIENT
Start: 2024-11-21 | End: 2024-11-29

## 2024-11-21 RX ORDER — MIRTAZAPINE 30 MG/1
1 TABLET ORAL
Qty: 0 | Refills: 0 | DISCHARGE

## 2024-11-21 RX ORDER — MIRTAZAPINE 15 MG/1
0.5 TABLET, FILM COATED ORAL
Refills: 0 | DISCHARGE

## 2024-11-21 RX ORDER — FERROUS SULFATE 325(65) MG
1 TABLET ORAL
Refills: 0 | DISCHARGE

## 2024-11-21 RX ORDER — 0.9 % SODIUM CHLORIDE 0.9 %
1000 INTRAVENOUS SOLUTION INTRAVENOUS
Refills: 0 | Status: DISCONTINUED | OUTPATIENT
Start: 2024-11-21 | End: 2024-11-29

## 2024-11-21 RX ORDER — FERROUS SULFATE 325(65) MG
325 TABLET ORAL AT BEDTIME
Refills: 0 | Status: DISCONTINUED | OUTPATIENT
Start: 2024-11-21 | End: 2024-11-29

## 2024-11-21 RX ORDER — CHLORHEXIDINE GLUCONATE 1.2 MG/ML
15 RINSE ORAL
Refills: 0 | DISCHARGE

## 2024-11-21 RX ORDER — ACETAMINOPHEN, DIPHENHYDRAMINE HCL, PHENYLEPHRINE HCL 325; 25; 5 MG/1; MG/1; MG/1
3 TABLET ORAL AT BEDTIME
Refills: 0 | Status: DISCONTINUED | OUTPATIENT
Start: 2024-11-21 | End: 2024-11-29

## 2024-11-21 RX ORDER — SENNOSIDES 8.6 MG
2 TABLET ORAL AT BEDTIME
Refills: 0 | Status: DISCONTINUED | OUTPATIENT
Start: 2024-11-21 | End: 2024-11-29

## 2024-11-21 RX ORDER — DIVALPROEX SODIUM 500 MG
1000 TABLET, DELAYED RELEASE (ENTERIC COATED) ORAL AT BEDTIME
Refills: 0 | Status: DISCONTINUED | OUTPATIENT
Start: 2024-11-21 | End: 2024-11-29

## 2024-11-21 RX ORDER — BUSPIRONE HCL 15 MG
15 TABLET ORAL
Refills: 0 | Status: DISCONTINUED | OUTPATIENT
Start: 2024-11-21 | End: 2024-11-29

## 2024-11-21 RX ORDER — MIRTAZAPINE 15 MG/1
7.5 TABLET, FILM COATED ORAL AT BEDTIME
Refills: 0 | Status: DISCONTINUED | OUTPATIENT
Start: 2024-11-21 | End: 2024-11-29

## 2024-11-21 RX ORDER — MAGNESIUM, ALUMINUM HYDROXIDE 200-225/5
30 SUSPENSION, ORAL (FINAL DOSE FORM) ORAL EVERY 4 HOURS
Refills: 0 | Status: DISCONTINUED | OUTPATIENT
Start: 2024-11-21 | End: 2024-11-29

## 2024-11-21 RX ORDER — LORATADINE 10 MG/1
1 TABLET ORAL
Refills: 0 | DISCHARGE

## 2024-11-21 RX ORDER — ACETAMINOPHEN 500MG 500 MG/1
650 TABLET, COATED ORAL EVERY 6 HOURS
Refills: 0 | Status: DISCONTINUED | OUTPATIENT
Start: 2024-11-21 | End: 2024-11-29

## 2024-11-21 RX ORDER — TAMSULOSIN HYDROCHLORIDE 0.4 MG/1
0.8 CAPSULE ORAL AT BEDTIME
Refills: 0 | Status: DISCONTINUED | OUTPATIENT
Start: 2024-11-21 | End: 2024-11-29

## 2024-11-21 RX ORDER — FLUPHENAZINE HCL 2.5 MG
50 TABLET ORAL
Refills: 0 | DISCHARGE

## 2024-11-21 RX ORDER — VITAMIN A
1 CREAM (GRAM) TOPICAL
Refills: 0 | DISCHARGE

## 2024-11-21 RX ORDER — NYSTATIN 100000 [USP'U]/G
1 POWDER TOPICAL
Refills: 0 | DISCHARGE

## 2024-11-21 RX ORDER — LEVOTHYROXINE SODIUM 150 MCG
50 TABLET ORAL DAILY
Refills: 0 | Status: DISCONTINUED | OUTPATIENT
Start: 2024-11-21 | End: 2024-11-29

## 2024-11-21 RX ADMIN — MIRTAZAPINE 7.5 MILLIGRAM(S): 15 TABLET, FILM COATED ORAL at 21:26

## 2024-11-21 RX ADMIN — Medication 325 MILLIGRAM(S): at 21:26

## 2024-11-21 RX ADMIN — CLONIDINE HYDROCHLORIDE 0.3 MILLIGRAM(S): 0.3 TABLET ORAL at 21:26

## 2024-11-21 RX ADMIN — Medication 2 TABLET(S): at 21:25

## 2024-11-21 RX ADMIN — Medication 500 MILLIGRAM(S): at 15:36

## 2024-11-21 RX ADMIN — RISPERIDONE 4 MILLIGRAM(S): 4 TABLET ORAL at 22:19

## 2024-11-21 RX ADMIN — Medication 1: at 16:59

## 2024-11-21 RX ADMIN — ACETAMINOPHEN, DIPHENHYDRAMINE HCL, PHENYLEPHRINE HCL 3 MILLIGRAM(S): 325; 25; 5 TABLET ORAL at 21:25

## 2024-11-21 RX ADMIN — Medication 1000 MILLIGRAM(S): at 21:27

## 2024-11-21 RX ADMIN — Medication 10 MILLIGRAM(S): at 21:25

## 2024-11-21 RX ADMIN — TAMSULOSIN HYDROCHLORIDE 0.8 MILLIGRAM(S): 0.4 CAPSULE ORAL at 21:25

## 2024-11-21 NOTE — H&P ADULT - NSHPLABSRESULTS_GEN_ALL_CORE
.  LABS:                         12.1   8.55  )-----------( 222      ( 21 Nov 2024 00:25 )             39.1     11-21    135  |  102  |  13  ----------------------------<  198[H]  4.3   |  30  |  1.09    Ca    9.1      21 Nov 2024 00:25    TPro  6.4  /  Alb  3.1[L]  /  TBili  0.2  /  DBili  x   /  AST  12[L]  /  ALT  32  /  AlkPhos  67  11-21      Urinalysis Basic - ( 21 Nov 2024 00:25 )    Color: x / Appearance: x / SG: x / pH: x  Gluc: 198 mg/dL / Ketone: x  / Bili: x / Urobili: x   Blood: x / Protein: x / Nitrite: x   Leuk Esterase: x / RBC: x / WBC x   Sq Epi: x / Non Sq Epi: x / Bacteria: x            RADIOLOGY, EKG & ADDITIONAL TESTS: Reviewed.

## 2024-11-21 NOTE — ED ADULT NURSE NOTE - CHIEF COMPLAINT QUOTE
CEM and SCPD 2684 from group home complaining of suicidal ideations. Reports he was stressed b/c he was having trouble urinating so he started to bite his arm. States "when I am stressed out I hurt myself I can't help it." Denies SI/HI., tactile/ visual/ auditory hallucinations. Bite wounds present to left forearm, no active bleeding. 1:1 initiated in triage for safety measures. Pt calm and cooperative. Aide from group home on the way. PMH depression, bipolar disorder.

## 2024-11-21 NOTE — BH CONSULTATION LIAISON ASSESSMENT NOTE - HPI (INCLUDE ILLNESS QUALITY, SEVERITY, DURATION, TIMING, CONTEXT, MODIFYING FACTORS, ASSOCIATED SIGNS AND SYMPTOMS)
Patient is a 34-year-old  SWM, with history of autism, testicular cancer  - status post bilateral orchiectomy, diabetes mellitus, impulse control disorder, hyperlipidemia, BPH, hypothyroidism, urinary retention, GERD, asthma, obesity, and intellectual disability brought in by ambulance for urinary retention and poorly tolerating  Sarabia.   Denies any pain, discomfort. Patient denies being depressed and denies being anxious.  He reports that he sleeps well and eats well.  Patient denies hearing any voices seeing things.  He denies being angry and denies any thoughts about harming anybody.  He does not feel agitated nor upset.  He is able to say that he is here because of inability to urinate.  Patient was recently seen and cleared by psychiatry in [facility].  Discharged from [facility] on 18th of this month.  He has a history of frequent visits to [place] for urinary retention.  per his hx,  Sarabia usually needs to be placed for urinary retention.  Also states he lives at group home and their policy is he is not able to have Sarabia at the group home so needs to be admitted when Sarabia needs to be placed. Patient at this time appears to be calm with no behavioral issues.

## 2024-11-21 NOTE — H&P ADULT - NSHPPHYSICALEXAM_GEN_ALL_CORE
PHYSICAL EXAM:    GENERAL: Comfortable, no acute distress   HEAD:  Normocephalic, atraumatic  EYES: EOMI, PERRLA  HEENT: Moist mucous membranes  NECK: Supple, No JVD  NERVOUS SYSTEM:  Alert & Oriented X3, Motor Strength 5/5 B/L upper and lower extremities  CHEST/LUNG: Clear to auscultation bilaterally  HEART: Regular rate and rhythm  ABDOMEN: Soft, non tender, Nondistended, Bowel sounds present  GENITOURINARY: mckeon in place draining clear yellow urine  EXTREMITIES:   No clubbing, cyanosis, or edema  MUSCULOSKELETAL- No muscle tenderness, no joint tenderness  SKIN-no rash

## 2024-11-21 NOTE — BH CONSULTATION LIAISON ASSESSMENT NOTE - CURRENT MEDICATION
MEDICATIONS  (STANDING):    MEDICATIONS  (PRN):  acetaminophen     Tablet .. 650 milliGRAM(s) Oral every 6 hours PRN Mild Pain (1 - 3)  aluminum hydroxide/magnesium hydroxide/simethicone Suspension 30 milliLiter(s) Oral every 4 hours PRN Dyspepsia  LORazepam   Injectable 1 milliGRAM(s) IV Push every 4 hours PRN Agitation  melatonin 3 milliGRAM(s) Oral at bedtime PRN Insomnia  ondansetron Injectable 4 milliGRAM(s) IV Push every 6 hours PRN Nausea and/or Vomiting

## 2024-11-21 NOTE — ED ADULT NURSE NOTE - OBJECTIVE STATEMENT
Pt presents to ed c/o urinary retention. Pt states "I was having pain in my bladder so bad I clawed myself". On bladder scan pt has 700ml of urine, pt states he has been unable to urinate. Pt denies blood in his urine. On pt right arm 3 nail marks noted, no bleeding at this time. Pt reports history of SI, currently declines SI at this time. Pt placed on 1:1 staff at bedside, belongings in security. Pt denies any other complaints at this time.
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
## 2024-11-21 NOTE — PATIENT PROFILE ADULT - FALL HARM RISK - HARM RISK INTERVENTIONS

## 2024-11-21 NOTE — CONSULT NOTE ADULT - SUBJECTIVE AND OBJECTIVE BOX
HPI:    33 yo Male with extensive PMH of Bipolar Disorder, Autism, ADHD, MR, Depression, behavioral disorder requiring previous inpatient psychiatric admissions and constant 1:1 observation, Hypothyroidism, Asthma, DM2, HTN, HLD, urinary retention, hx testicular cancer s/p b/l orchiectomy. Urology consulted for pt with urinary retention. Reviewed EMR pt was admitted to [facility] for urinary retention twice in the past 2 weeks.  As per EMR "TOV was completed and discharged back to his group home; pt's group home does not have the resources to take care and manage the mckeon cath." Pt presents again with urinary retention.     PAST MEDICAL & SURGICAL HISTORY:  Intellectual disability      Obesity      Asthma      GERD (gastroesophageal reflux disease)      Factitious disorder      Urinary retention      Enuresis      Myopia of both eyes      Autism      Hypothyroid      History of BPH      HLD (hyperlipidemia)      Impulse control disorder      Type 2 diabetes mellitus      Testicular cancer      Autism      History of orchiectomy          REVIEW OF SYSTEMS      Pt is a poor historian unable to assess  MEDICATIONS  (STANDING):  atorvastatin 10 milliGRAM(s) Oral at bedtime  busPIRone 15 milliGRAM(s) Oral <User Schedule>  cloNIDine 0.3 milliGRAM(s) Oral every 12 hours  dextrose 5%. 1000 milliLiter(s) (50 mL/Hr) IV Continuous <Continuous>  dextrose 5%. 1000 milliLiter(s) (100 mL/Hr) IV Continuous <Continuous>  dextrose 50% Injectable 25 Gram(s) IV Push once  dextrose 50% Injectable 12.5 Gram(s) IV Push once  dextrose 50% Injectable 25 Gram(s) IV Push once  divalproex ER 1000 milliGRAM(s) Oral at bedtime  divalproex  milliGRAM(s) Oral daily  ferrous    sulfate 325 milliGRAM(s) Oral at bedtime  glucagon  Injectable 1 milliGRAM(s) IntraMuscular once  insulin lispro (ADMELOG) corrective regimen sliding scale   SubCutaneous three times a day before meals  levothyroxine 50 MICROGram(s) Oral daily  mirtazapine 7.5 milliGRAM(s) Oral at bedtime  pantoprazole    Tablet 40 milliGRAM(s) Oral before breakfast  risperiDONE   Tablet 4 milliGRAM(s) Oral two times a day  senna 2 Tablet(s) Oral at bedtime  tamsulosin 0.8 milliGRAM(s) Oral at bedtime    MEDICATIONS  (PRN):  acetaminophen     Tablet .. 650 milliGRAM(s) Oral every 6 hours PRN Mild Pain (1 - 3)  aluminum hydroxide/magnesium hydroxide/simethicone Suspension 30 milliLiter(s) Oral every 4 hours PRN Dyspepsia  dextrose Oral Gel 15 Gram(s) Oral once PRN Blood Glucose LESS THAN 70 milliGRAM(s)/deciliter  LORazepam   Injectable 1 milliGRAM(s) IV Push every 4 hours PRN Agitation  melatonin 3 milliGRAM(s) Oral at bedtime PRN Insomnia  ondansetron Injectable 4 milliGRAM(s) IV Push every 6 hours PRN Nausea and/or Vomiting      Allergies    Celebrex (Short breath)  Zyprexa (Unknown)  Haldol (Rash)  Bee stings (Unknown)  Haldol (Other)  Zyprexa (Dystonic RXN)    Intolerances        SOCIAL HISTORY:    FAMILY HISTORY:  FH: diabetes mellitus (Mother)        Vital Signs Last 24 Hrs  T(C): 36.4 (21 Nov 2024 06:36), Max: 37.1 (20 Nov 2024 19:47)  T(F): 97.5 (21 Nov 2024 06:36), Max: 98.7 (20 Nov 2024 19:47)  HR: 79 (21 Nov 2024 06:36) (71 - 91)  BP: 122/70 (21 Nov 2024 06:36) (104/58 - 125/68)  BP(mean): 67 (21 Nov 2024 04:40) (67 - 67)  RR: 18 (21 Nov 2024 06:36) (15 - 18)  SpO2: 96% (21 Nov 2024 06:36) (96% - 98%)    Parameters below as of 21 Nov 2024 06:36  Patient On (Oxygen Delivery Method): room air        PHYSICAL EXAM:      VITALS  T(C): 36.4 (11-21-24 @ 06:36), Max: 37.1 (11-20-24 @ 19:47)  HR: 79 (11-21-24 @ 06:36) (71 - 91)  BP: 122/70 (11-21-24 @ 06:36) (104/58 - 125/68)  RR: 18 (11-21-24 @ 06:36) (15 - 18)  SpO2: 96% (11-21-24 @ 06:36) (96% - 98%)               Lung    : No resp distress  Abdo:   : Soft, Non tender, No guarding, No distension   Back    : No CVAT b/l  Genitalia Male: mckeon with yellow urine    LABS:                        12.1   8.55  )-----------( 222      ( 21 Nov 2024 00:25 )             39.1     11-21    135  |  102  |  13  ----------------------------<  198[H]  4.3   |  30  |  1.09    Ca    9.1      21 Nov 2024 00:25    TPro  6.4  /  Alb  3.1[L]  /  TBili  0.2  /  DBili  x   /  AST  12[L]  /  ALT  32  /  AlkPhos  67  11-21      Urinalysis Basic - ( 21 Nov 2024 00:25 )    Color: x / Appearance: x / SG: x / pH: x  Gluc: 198 mg/dL / Ketone: x  / Bili: x / Urobili: x   Blood: x / Protein: x / Nitrite: x   Leuk Esterase: x / RBC: x / WBC x   Sq Epi: x / Non Sq Epi: x / Bacteria: x        RADIOLOGY & ADDITIONAL STUDIES:< from: CT Abdomen and Pelvis No Cont (11.09.24 @ 06:25) >    ACC: [id number] EXAM:  CT ABDOMEN AND PELVIS   ORDERED BY: KRISTAL SOLIS     PROCEDURE DATE:  11/09/2024          INTERPRETATION:  CLINICAL INFORMATION: Urinary retention.    COMPARISON: September 8, 2024.    CONTRAST/COMPLICATIONS:  IV Contrast: NONE  Oral Contrast: NONE  Complications: None reported at time of study completion    PROCEDURE:  CT of the Abdomen and Pelvis was performed.  Sagittal and coronal reformats were performed.    FINDINGS:  LOWER CHEST: Mild bibasilar pleural thickening and minimal subsegmental   atelectasis.  Small hiatal hernia.  LIVER: Steatosis. Hepatomegaly. No focal space-occupying disease liver.  BILE DUCTS: Normal caliber.  GALLBLADDER: Within normal limits.  SPLEEN: Within normal limits.  PANCREAS: Within normal limits.  ADRENALS: Within normal limits.  KIDNEYS/URETERS: No renal/ureteral stones or hydronephrosis. Circumaortic   left renal vein    BLADDER: Mckeon catheter.  REPRODUCTIVE ORGANS: Prostate within normal limits.    BOWEL: No bowel obstruction. Appendix is normal.  PERITONEUM/RETROPERITONEUM: Mild hazy density and small lymph nodes   within the central mesentery suggesting manifestations of nonspecific   mesenteric panniculitis. No mass or fluid collection.  No free air or abscess.  VESSELS: Within normal limits.  LYMPH NODES: No lymphadenopathy.  ABDOMINAL WALL: Within normal limits.  BONES: Minimal degenerative changes. No lytic or blastic process.    IMPRESSION:  Stable Hepatomegaly and steatosis.    No hydronephrosis.    Mckeon catheter is present within the urinary bladder which is   decompressed.    Mild hazy density and small lymph nodes within the central mesentery   suggesting manifestations of nonspecific mesenteric panniculitis. No mass   or fluid collection    Please refer to detailed findings otherwise described above.    --- End of Report ---             ODALYS WONG MD; Attending Radiologist  This document has been electronically signed. Nov 9 2024  1:35PM    < end of copied text >

## 2024-11-21 NOTE — CONSULT NOTE ADULT - ASSESSMENT
33 yo male with extensive PMH as above and pertinent hx of recurrent urinary retention requiring indwelling mckeon now admitted for urinary retention.  Discussed with Dr. Yee with following recommendations.  Keep indwelling mckeon in place.  UA not concerning for UTI, follow up UCx and treat accordingly  Continue Flomax   D/C pt with mckeon to leg bag and outpatient urodynamics study to further assess pt's urinary retention.    Case discussed with Dr. Yee

## 2024-11-21 NOTE — BH CONSULTATION LIAISON ASSESSMENT NOTE - NSBHATTESTBILLING_PSY_A_CORE
99221-Initial OBS or IP - low complexity OR 40-54 mins [id number] diagnostic evaluation with medical services

## 2024-11-21 NOTE — ED ADULT NURSE NOTE - NSFALLUNIVINTERV_ED_ALL_ED
Bed/Stretcher in lowest position, wheels locked, appropriate side rails in place/Call bell, personal items and telephone in reach/Instruct patient to call for assistance before getting out of bed/chair/stretcher/Non-slip footwear applied when patient is off stretcher/[place] to call system/Physically safe environment - no spills, clutter or unnecessary equipment/Purposeful proactive rounding/Room/bathroom lighting operational, light cord in reach

## 2024-11-21 NOTE — BH CONSULTATION LIAISON ASSESSMENT NOTE - SUMMARY
Patient is a 35 y/o male with intellectual disability struggling with poor impulse control. Patient is a 34-year-old  SWM, with history of autism, testicular cancer  - status post bilateral orchiectomy, diabetes mellitus, impulse control disorder, hyperlipidemia, BPH, hypothyroidism, urinary retention, GERD, asthma, obesity, and intellectual disability brought in by ambulance for urinary retention and poorly tolerating  Sarabia.   Denies any pain, discomfort. Patient denies being depressed and denies being anxious.  He reports that he sleeps well and eats well.  Patient denies hearing any voices seeing things.  He denies being angry and denies any thoughts about harming anybody.  He does not feel agitated nor upset.  He is able to say that he is here because of inability to urinate.  Patient was recently seen and cleared by psychiatry in [facility].  Discharged from [facility] on 18th of this month.  He has a history of frequent visits to [facility] for urinary retention.  per his hx,  Sarabia usually needs to be placed for urinary retention.  Also states he lives at group home and their policy is he is not able to have Sarabia at the group home so needs to be admitted when Sarabia needs to be placed. Patient at this time appears to be calm with no behavioral issues.  Patient does not meet criteria for inpatient psychiatry level of care.    Plan:    Patient is cleared psychiatrically to return to group home and continue treatment with his psychiatrist there.    Primary team to complete medication reconciliation with his home medication.

## 2024-11-21 NOTE — BH CONSULTATION LIAISON ASSESSMENT NOTE - NSBHCHARTREVIEWVS_PSY_A_CORE FT
Vital Signs Last 24 Hrs  T(C): 36.4 (21 Nov 2024 06:36), Max: 37.1 (20 Nov 2024 19:47)  T(F): 97.5 (21 Nov 2024 06:36), Max: 98.7 (20 Nov 2024 19:47)  HR: 79 (21 Nov 2024 06:36) (71 - 91)  BP: 122/70 (21 Nov 2024 06:36) (104/58 - 125/68)  BP(mean): 67 (21 Nov 2024 04:40) (67 - 67)  RR: 18 (21 Nov 2024 06:36) (15 - 18)  SpO2: 96% (21 Nov 2024 06:36) (96% - 98%)    Parameters below as of 21 Nov 2024 06:36  Patient On (Oxygen Delivery Method): room air

## 2024-11-21 NOTE — H&P ADULT - ASSESSMENT
35 y/o M with PMHx of Autism, testicular CA s/p orchiectomy, DM2, Impulse control disorder, HLD, BPH, hypothyroid, Factitious disorder, GERD, asthma, Intellectual disability presents to the ED c/o inabilty to urinate for last few hours - found to be in urinary retention 700 cc, mckeon placed.     Urinary retention.   patient admitted with same admission 2 days ago at [place]  700 cc retained yesterday --> mckeon placed. cannot go back to his facility with mckeon, recurrenta dmissions need to discuss either placement or allowance of mckeon at group home   - UA unremarkable   - Continue home tamsulosin 0.8mg QHS   - Urology recs   - f/u with outpatient urologist Dr. Jack Paul.    Major depression.  - chronic  - recently increased depression, attempt to self harm the LUE before arriving to the ED  - 1:1 observation  - Psych f/u  - Continue Mirtazapine in the interim.    Benign prostatic hyperplasia.  -Continue home tamsulosin.    Bipolar disorder.  -Continue Depakote; Continue Risperdal for behavioral disorder, agitation.    ADHD.  -Continue clonidine.    T2DM (type 2 diabetes mellitus).   Takes metformin at home  - Hold home medication  - Start LDISS  - POCT QAC QHS, Hypoglycemia protocol   - A1C 7.2 09/2024.    Hypothyroidism.   - Continue home levothyroxine.    HLD (hyperlipidemia).   - Continue home atorvastatin.     GERD (gastroesophageal reflux disease).   -  Continue home protonix.    Testicular cancer.   - S/P B/L orchiectomy  - Continue home testosterone transdermal gel.    Need for prophylactic measure.   DVT PPx: Lovenox 40mg QD.

## 2024-11-21 NOTE — PHARMACOTHERAPY INTERVENTION NOTE - COMMENTS
Medication history complete, patient is from [facility] in [place], reviewed and confirmed medication with list provided by facility.

## 2024-11-21 NOTE — H&P ADULT - HISTORY OF PRESENT ILLNESS
33 y/o M with PMHx of Autism, testicular CA s/p orchiectomy, DM2, Impulse control disorder, HLD, BPH, hypothyroid, Factitious disorder, GERD, asthma, Intellectual disability presents to the ED c/o inabilty to urinate for last few hours. He states he often gets this and needs a mckeon for small periods of time and then goes back to his facility.  He was  admitted at [facility] 2 days ago then dc'd back to facility. Pt brought in by facility aide noted he was aggressive at facility also threatening to harm himself and others. Pt denies this  and continues to deny it. ROS otherwise negative.     In the ED  Vitals Temp 97.5 HR 79 /70 O2 sat 96% on RA  Labs WBC 8.55 Hg 12.1 Plt 222 Na 135 K 4.3 Cl 102 BUN 13 Cr 1.09 AST 12 ALT 32   Imaging none  Intervention mckeon placement

## 2024-11-22 ENCOUNTER — TRANSCRIPTION ENCOUNTER (OUTPATIENT)
Age: 34
End: 2024-11-22

## 2024-11-22 LAB
GLUCOSE BLDC GLUCOMTR-MCNC: 140 MG/DL — HIGH (ref 70–99)
GLUCOSE BLDC GLUCOMTR-MCNC: 155 MG/DL — HIGH (ref 70–99)
GLUCOSE BLDC GLUCOMTR-MCNC: 206 MG/DL — HIGH (ref 70–99)

## 2024-11-22 PROCEDURE — 99233 SBSQ HOSP IP/OBS HIGH 50: CPT

## 2024-11-22 RX ADMIN — MIRTAZAPINE 7.5 MILLIGRAM(S): 15 TABLET, FILM COATED ORAL at 22:47

## 2024-11-22 RX ADMIN — Medication 1000 MILLIGRAM(S): at 23:28

## 2024-11-22 RX ADMIN — TAMSULOSIN HYDROCHLORIDE 0.8 MILLIGRAM(S): 0.4 CAPSULE ORAL at 22:00

## 2024-11-22 RX ADMIN — ENOXAPARIN SODIUM 40 MILLIGRAM(S): 30 INJECTION SUBCUTANEOUS at 17:12

## 2024-11-22 RX ADMIN — Medication 15 MILLIGRAM(S): at 10:41

## 2024-11-22 RX ADMIN — Medication 325 MILLIGRAM(S): at 23:27

## 2024-11-22 RX ADMIN — Medication 10 MILLIGRAM(S): at 22:01

## 2024-11-22 RX ADMIN — Medication 500 MILLIGRAM(S): at 17:13

## 2024-11-22 RX ADMIN — PANTOPRAZOLE SODIUM 40 MILLIGRAM(S): 40 TABLET, DELAYED RELEASE ORAL at 05:31

## 2024-11-22 RX ADMIN — Medication 2: at 17:05

## 2024-11-22 RX ADMIN — CLONIDINE HYDROCHLORIDE 0.3 MILLIGRAM(S): 0.3 TABLET ORAL at 23:28

## 2024-11-22 RX ADMIN — Medication 50 MICROGRAM(S): at 05:31

## 2024-11-22 RX ADMIN — RISPERIDONE 4 MILLIGRAM(S): 4 TABLET ORAL at 13:35

## 2024-11-22 RX ADMIN — ACETAMINOPHEN 500MG 650 MILLIGRAM(S): 500 TABLET, COATED ORAL at 10:40

## 2024-11-22 RX ADMIN — Medication 1: at 13:34

## 2024-11-22 RX ADMIN — RISPERIDONE 4 MILLIGRAM(S): 4 TABLET ORAL at 22:47

## 2024-11-22 RX ADMIN — CLONIDINE HYDROCHLORIDE 0.3 MILLIGRAM(S): 0.3 TABLET ORAL at 10:42

## 2024-11-22 NOTE — DISCHARGE NOTE PROVIDER - NSDCMRMEDTOKEN_GEN_ALL_CORE_FT
atorvastatin 10 mg oral tablet: 1 tab(s) orally once a day (in the evening)  busPIRone 15 mg oral tablet: 1 tab(s) orally once a day (in the evening)  chlorhexidine 0.12% mucous membrane liquid: 15 milliliter(s) orally 2 times a day ***Swish and spit***  cloNIDine 0.3 mg oral tablet: 1 tab(s) orally every 12 hours  divalproex sodium 500 mg oral tablet, extended release: 2 tab(s) orally once a day (at bedtime)  divalproex sodium 500 mg oral tablet, extended release: 1 tab(s) orally once a day (in the afternoon) at 2pm  ferrous sulfate 325 mg (65 mg elemental iron) oral tablet: 1 tab(s) orally once a day (at bedtime)  fluPHENAZine decanoate 25 mg/mL injectable solution: 50 milligram(s) intramuscularly every 2 weeks  Klayesta 100,000 units/g topical powder: Apply topically to affected area 2 times a day  levothyroxine 50 mcg (0.05 mg) oral tablet: 1 tab(s) orally once a day  loratadine 10 mg oral tablet: 1 tab(s) orally once a day  metFORMIN 1000 mg oral tablet: 1 tab(s) orally 2 times a day (with meals)  mirtazapine 15 mg oral tablet: 1.5 tab(s) orally once a day (at bedtime)  Protonix 20 mg oral delayed release tablet: 1 tab(s) orally once a day  Remeron 15 mg oral tablet: 0.5 tab(s) orally once a day (at bedtime)  risperiDONE 4 mg oral tablet: 1 tab(s) orally 2 times a day at 2pm &amp; 8pm  senna (sennosides) 8.6 mg oral tablet: 2 tab(s) orally once a day (at bedtime)  tamsulosin 0.4 mg oral capsule: 2 cap(s) orally once a day (at bedtime)  testosterone 50 mg/5 g (1%) transdermal gel: 1 packet(s) transdermally once a day to both shoulders and arms  Vitamin A and D topical ointment: Apply topically to affected area to rash once daily   atorvastatin 10 mg oral tablet: 1 tab(s) orally once a day (in the evening)  busPIRone 15 mg oral tablet: 1 tab(s) orally once a day (in the evening)  chlorhexidine 0.12% mucous membrane liquid: 15 milliliter(s) orally 2 times a day ***Swish and spit***  cloNIDine 0.3 mg oral tablet: 1 tab(s) orally every 12 hours  divalproex sodium 500 mg oral tablet, extended release: 2 tab(s) orally once a day (at bedtime)  divalproex sodium 500 mg oral tablet, extended release: 1 tab(s) orally once a day (in the afternoon) at 2pm  ferrous sulfate 325 mg (65 mg elemental iron) oral tablet: 1 tab(s) orally once a day (at bedtime)  fluPHENAZine decanoate 25 mg/mL injectable solution: 50 milligram(s) intramuscularly every 2 weeks  Klayesta 100,000 units/g topical powder: Apply topically to affected area 2 times a day  levothyroxine 50 mcg (0.05 mg) oral tablet: 1 tab(s) orally once a day  loratadine 10 mg oral tablet: 1 tab(s) orally once a day  metFORMIN 1000 mg oral tablet: 1 tab(s) orally 2 times a day (with meals)  Protonix 20 mg oral delayed release tablet: 1 tab(s) orally once a day  Remeron 15 mg oral tablet: 0.5 tab(s) orally once a day (at bedtime)  risperiDONE 4 mg oral tablet: 1 tab(s) orally 2 times a day at 2pm &amp; 8pm  senna (sennosides) 8.6 mg oral tablet: 2 tab(s) orally once a day (at bedtime)  tamsulosin 0.4 mg oral capsule: 2 cap(s) orally once a day (at bedtime)  testosterone 50 mg/5 g (1%) transdermal gel: 1 packet(s) transdermally once a day to both shoulders and arms  Vitamin A and D topical ointment: Apply topically to affected area to rash once daily   atorvastatin 10 mg oral tablet: 1 tab(s) orally once a day (in the evening)  busPIRone 15 mg oral tablet: 1 tab(s) orally once a day (in the evening)  chlorhexidine 0.12% mucous membrane liquid: 15 milliliter(s) orally 2 times a day ***Swish and spit***  cloNIDine 0.1 mg oral tablet: 1 tab(s) orally 3 times a day  divalproex sodium 500 mg oral tablet, extended release: 2 tab(s) orally once a day (at bedtime)  divalproex sodium 500 mg oral tablet, extended release: 1 tab(s) orally once a day (in the afternoon) at 2pm  ferrous sulfate 325 mg (65 mg elemental iron) oral tablet: 1 tab(s) orally once a day (at bedtime)  fluPHENAZine decanoate 25 mg/mL injectable solution: 50 milligram(s) intramuscularly every 2 weeks  Klayesta 100,000 units/g topical powder: Apply topically to affected area 2 times a day  levothyroxine 50 mcg (0.05 mg) oral tablet: 1 tab(s) orally once a day  loratadine 10 mg oral tablet: 1 tab(s) orally once a day  metFORMIN 1000 mg oral tablet: 1 tab(s) orally 2 times a day (with meals)  Protonix 20 mg oral delayed release tablet: 1 tab(s) orally once a day  Remeron 15 mg oral tablet: 0.5 tab(s) orally once a day (at bedtime)  risperiDONE 4 mg oral tablet: 1 tab(s) orally 2 times a day at 2pm &amp; 8pm  senna (sennosides) 8.6 mg oral tablet: 2 tab(s) orally once a day (at bedtime)  tamsulosin 0.4 mg oral capsule: 2 cap(s) orally once a day (at bedtime)  testosterone 50 mg/5 g (1%) transdermal gel: 1 packet(s) transdermally once a day to both shoulders and arms  Vitamin A and D topical ointment: Apply topically to affected area to rash once daily

## 2024-11-22 NOTE — DISCHARGE NOTE PROVIDER - CARE PROVIDER_API CALL
Maurizio Yee Anil  Urology  [address], [address]  [place] [address]  Phone: [phone]  Fax: [phone]  Follow Up Time: 1 week

## 2024-11-22 NOTE — DISCHARGE NOTE PROVIDER - NSDCCPCAREPLAN_GEN_ALL_CORE_FT
PRINCIPAL DISCHARGE DIAGNOSIS  Diagnosis: Acute on chronic urinary retention  Assessment and Plan of Treatment: You came in with urinary retention   You were retaining 700 cc of urine so the mckeon was placed  Please follow up with urology next week for removal so you can go back to your group home      SECONDARY DISCHARGE DIAGNOSES  Diagnosis: Anxiety with agitation  Assessment and Plan of Treatment:

## 2024-11-22 NOTE — DISCHARGE NOTE PROVIDER - HOSPITAL COURSE
#Discharge: do not delete    35 y/o M with PMHx of Autism, testicular CA s/p orchiectomy, DM2, Impulse control disorder, HLD, BPH, hypothyroid, Factitious disorder, GERD, asthma, Intellectual disability presents to the ED c/o inabilty to urinate for last few hours - found to be in urinary retention 700 cc, mckeon placed.  Problem List/Main Diagnoses (system-based):    Urinary retention.   patient admitted with same admission 2 days ago at [facility]  700 cc retained yesterday --> mckeon placed. cannot go back to his facility with mckeon, recurrent admissions need to discuss either placement or allowance of mckeon at group home   -f/u urology in 1 week and trial of void    Major depression.  - chronic  - recently increased depression, attempt to self harm the LUE before arriving to the ED  - 1:1 observation  - Psych f/u  - Continue Mirtazapine in the interim.    Benign prostatic hyperplasia.  -Continue home tamsulosin.    Bipolar disorder.  -Continue Depakote; Continue Risperdal for behavioral disorder, agitation.    ADHD.  -Continue clonidine.    T2DM (type 2 diabetes mellitus).   Takes metformin at home  - Hold home medication  - Start LDISS  - POCT QAC QHS, Hypoglycemia protocol   - A1C 7.2 09/2024.    Hypothyroidism.   - Continue home levothyroxine.    HLD (hyperlipidemia).   - Continue home atorvastatin.     GERD (gastroesophageal reflux disease).   -  Continue home protonix.    Testicular cancer.   - S/P B/L orchiectomy  - Continue home testosterone transdermal ge  Inpatient treatment course: as above  New medications: none - mckeon  Exam to be followed outpatient: urology f/u for mckeon remoavl    CC: Urine retention    History of Present Illness:   35 y/o M with PMHx of Autism, testicular CA s/p orchiectomy, DM2, Impulse control disorder, HLD, BPH, hypothyroid, Factitious disorder, GERD, asthma, Intellectual disability presents to the ED c/o inabilty to urinate for last few hours. He states he often gets this and needs a mckeon for small periods of time and then goes back to his facility.  He was  admitted at [place] [facility] 2 days ago then dc'd back to facility. Pt brought in by facility aide noted he was aggressive at facility also threatening to harm himself and others. Pt denies this  and continues to deny it. ROS otherwise negative.     In the ED  Vitals Temp 97.5 HR 79 /70 O2 sat 96% on RA  Labs WBC 8.55 Hg 12.1 Plt 222 Na 135 K 4.3 Cl 102 BUN 13 Cr 1.09 AST 12 ALT 32   Imaging none  Intervention mckeon placement    Hospital course:  Pt had mckeon placed for failed void trial.  Seen by urology, plan to follow up outpatient for urodynamic testing.  Pt also found to have borderline low BP, clonidine tapered to 0.1mg TID.  Pt otherwise status quo and ready to go to [facility].      REVIEW OF SYSTEMS: All other review of systems is negative unless indicated above.    Vital Signs Last 24 Hrs  T(C): 37.2 (28 Nov 2024 22:00), Max: 37.2 (28 Nov 2024 22:00)  T(F): 99 (28 Nov 2024 22:00), Max: 99 (28 Nov 2024 22:00)  HR: 99 (28 Nov 2024 22:00) (78 - 99)  BP: 128/89 (28 Nov 2024 22:00) (119/73 - 128/89)  BP(mean): --  RR: 18 (28 Nov 2024 22:00) (18 - 18)  SpO2: 96% (28 Nov 2024 22:00) (96% - 96%)    Parameters below as of 28 Nov 2024 22:00  Patient On (Oxygen Delivery Method): room air    PHYSICAL EXAM:    Constitutional: NAD, awake and alert  HEENT: PERR, EOMI, Normal Hearing, MMM  Neck: Soft and supple  Respiratory: Breath sounds are clear bilaterally, No wheezing, rales or rhonchi  Cardiovascular: S1 and S2, regular rate and rhythm, no Murmurs, gallops or rubs  Gastrointestinal: Bowel Sounds present, soft, nontender, nondistended, no guarding, no rebound  Extremities: No peripheral edema  : Mckeon in place, draining  Neurological: A/O x 3, no focal deficits in my limited exam    med/labs: Reviewed and interpreted

## 2024-11-22 NOTE — DISCHARGE NOTE PROVIDER - NSDCFUSCHEDAPPT_GEN_ALL_CORE_FT
[facility]  [facility]  Scheduled Appointment: 01/13/2025    Gabi Watson  [facility]  [facility]  Scheduled Appointment: 01/13/2025

## 2024-11-23 LAB
GLUCOSE BLDC GLUCOMTR-MCNC: 129 MG/DL — HIGH (ref 70–99)
GLUCOSE BLDC GLUCOMTR-MCNC: 165 MG/DL — HIGH (ref 70–99)
GLUCOSE BLDC GLUCOMTR-MCNC: 185 MG/DL — HIGH (ref 70–99)
GLUCOSE BLDC GLUCOMTR-MCNC: 186 MG/DL — HIGH (ref 70–99)

## 2024-11-23 PROCEDURE — 99233 SBSQ HOSP IP/OBS HIGH 50: CPT

## 2024-11-23 RX ADMIN — Medication 1000 MILLIGRAM(S): at 21:35

## 2024-11-23 RX ADMIN — Medication 1: at 11:54

## 2024-11-23 RX ADMIN — Medication 50 MICROGRAM(S): at 05:42

## 2024-11-23 RX ADMIN — Medication 1: at 17:08

## 2024-11-23 RX ADMIN — TAMSULOSIN HYDROCHLORIDE 0.8 MILLIGRAM(S): 0.4 CAPSULE ORAL at 21:36

## 2024-11-23 RX ADMIN — Medication 15 MILLIGRAM(S): at 10:31

## 2024-11-23 RX ADMIN — Medication 325 MILLIGRAM(S): at 21:36

## 2024-11-23 RX ADMIN — Medication 500 MILLIGRAM(S): at 14:38

## 2024-11-23 RX ADMIN — ENOXAPARIN SODIUM 40 MILLIGRAM(S): 30 INJECTION SUBCUTANEOUS at 17:08

## 2024-11-23 RX ADMIN — PANTOPRAZOLE SODIUM 40 MILLIGRAM(S): 40 TABLET, DELAYED RELEASE ORAL at 10:31

## 2024-11-23 RX ADMIN — CLONIDINE HYDROCHLORIDE 0.3 MILLIGRAM(S): 0.3 TABLET ORAL at 21:37

## 2024-11-23 RX ADMIN — MIRTAZAPINE 7.5 MILLIGRAM(S): 15 TABLET, FILM COATED ORAL at 21:36

## 2024-11-23 RX ADMIN — ACETAMINOPHEN, DIPHENHYDRAMINE HCL, PHENYLEPHRINE HCL 3 MILLIGRAM(S): 325; 25; 5 TABLET ORAL at 21:36

## 2024-11-23 RX ADMIN — Medication 2 TABLET(S): at 21:36

## 2024-11-23 RX ADMIN — Medication 10 MILLIGRAM(S): at 21:36

## 2024-11-23 RX ADMIN — RISPERIDONE 4 MILLIGRAM(S): 4 TABLET ORAL at 21:36

## 2024-11-23 RX ADMIN — CLONIDINE HYDROCHLORIDE 0.3 MILLIGRAM(S): 0.3 TABLET ORAL at 10:31

## 2024-11-23 RX ADMIN — RISPERIDONE 4 MILLIGRAM(S): 4 TABLET ORAL at 14:38

## 2024-11-24 LAB
GLUCOSE BLDC GLUCOMTR-MCNC: 136 MG/DL — HIGH (ref 70–99)
GLUCOSE BLDC GLUCOMTR-MCNC: 168 MG/DL — HIGH (ref 70–99)
GLUCOSE BLDC GLUCOMTR-MCNC: 197 MG/DL — HIGH (ref 70–99)
GLUCOSE BLDC GLUCOMTR-MCNC: 201 MG/DL — HIGH (ref 70–99)

## 2024-11-24 PROCEDURE — 99232 SBSQ HOSP IP/OBS MODERATE 35: CPT

## 2024-11-24 RX ORDER — NYSTATIN 100000 [USP'U]/G
1 POWDER TOPICAL
Refills: 0 | Status: DISCONTINUED | OUTPATIENT
Start: 2024-11-24 | End: 2024-11-29

## 2024-11-24 RX ADMIN — Medication 1000 MILLIGRAM(S): at 21:19

## 2024-11-24 RX ADMIN — ACETAMINOPHEN 500MG 650 MILLIGRAM(S): 500 TABLET, COATED ORAL at 16:24

## 2024-11-24 RX ADMIN — CLONIDINE HYDROCHLORIDE 0.3 MILLIGRAM(S): 0.3 TABLET ORAL at 21:20

## 2024-11-24 RX ADMIN — RISPERIDONE 4 MILLIGRAM(S): 4 TABLET ORAL at 12:03

## 2024-11-24 RX ADMIN — TAMSULOSIN HYDROCHLORIDE 0.8 MILLIGRAM(S): 0.4 CAPSULE ORAL at 21:20

## 2024-11-24 RX ADMIN — NYSTATIN 1 APPLICATION(S): 100000 POWDER TOPICAL at 21:21

## 2024-11-24 RX ADMIN — ACETAMINOPHEN, DIPHENHYDRAMINE HCL, PHENYLEPHRINE HCL 3 MILLIGRAM(S): 325; 25; 5 TABLET ORAL at 21:20

## 2024-11-24 RX ADMIN — Medication 1: at 16:30

## 2024-11-24 RX ADMIN — Medication 2 TABLET(S): at 21:21

## 2024-11-24 RX ADMIN — Medication 325 MILLIGRAM(S): at 21:20

## 2024-11-24 RX ADMIN — ACETAMINOPHEN 500MG 650 MILLIGRAM(S): 500 TABLET, COATED ORAL at 17:01

## 2024-11-24 RX ADMIN — MIRTAZAPINE 7.5 MILLIGRAM(S): 15 TABLET, FILM COATED ORAL at 21:21

## 2024-11-24 RX ADMIN — Medication 10 MILLIGRAM(S): at 21:21

## 2024-11-24 RX ADMIN — CLONIDINE HYDROCHLORIDE 0.3 MILLIGRAM(S): 0.3 TABLET ORAL at 09:35

## 2024-11-24 RX ADMIN — Medication 1: at 12:02

## 2024-11-24 RX ADMIN — RISPERIDONE 4 MILLIGRAM(S): 4 TABLET ORAL at 21:20

## 2024-11-24 RX ADMIN — Medication 15 MILLIGRAM(S): at 09:35

## 2024-11-24 RX ADMIN — Medication 500 MILLIGRAM(S): at 14:33

## 2024-11-24 RX ADMIN — ENOXAPARIN SODIUM 40 MILLIGRAM(S): 30 INJECTION SUBCUTANEOUS at 16:27

## 2024-11-24 NOTE — PROGRESS NOTE ADULT - ASSESSMENT
33 y/o M with PMHx of Autism, testicular CA s/p orchiectomy, DM2, Impulse control disorder, HLD, BPH, hypothyroid, Factitious disorder, GERD, asthma, Intellectual disability presents to the ED c/o inabilty to urinate for last few hours - found to be in urinary retention 700 cc, mckeon placed.    #Urinary retention.   patient admitted with same admission 2 days prior to this one at [place]  700 cc retained --> mckeon placed. cannot go back to his facility with mckeon, recurrenta dmissions need to discuss either placement or allowance of mckeon at group home   - plan for alycia due to keeping mckeon in with outpatient tov   - UA unremarkable   - Continue home tamsulosin 0.8mg QHS   - Urology recs     Major depression.  - chronic  - recently increased depression, attempt to self harm the LUE before arriving to the ED  - 1:1 observation  - Psych f/u  - Continue Mirtazapine in the interim.    Benign prostatic hyperplasia.  -Continue home tamsulosin.    Bipolar disorder.  -Continue Depakote; Continue Risperdal for behavioral disorder, agitation.    ADHD.  -Continue clonidine.    T2DM (type 2 diabetes mellitus).   Takes metformin at home  - Hold home medication  - Start LDISS  - POCT QAC QHS, Hypoglycemia protocol   - A1C 7.2 09/2024.    Hypothyroidism.   - Continue home levothyroxine.    HLD (hyperlipidemia).   - Continue home atorvastatin.     GERD (gastroesophageal reflux disease).   -  Continue home protonix.    Testicular cancer.   - S/P B/L orchiectomy  - Continue home testosterone transdermal gel.    Need for prophylactic measure.   DVT PPx: Lovenox 40mg QD.  Dispo: pending rehab
35 y/o M with PMHx of Autism, testicular CA s/p orchiectomy, DM2, Impulse control disorder, HLD, BPH, hypothyroid, Factitious disorder, GERD, asthma, Intellectual disability presents to the ED c/o inabilty to urinate for last few hours - found to be in urinary retention 700 cc, mckeon placed.     Urinary retention.   patient admitted with same admission 2 days ago at [facility]  700 cc retained yesterday --> mckeon placed. cannot go back to his facility with mckeon, recurrenta dmissions need to discuss either placement or allowance of mckeon at group home   - plan for alycia due to keeping mckeon in   - UA unremarkable   - Continue home tamsulosin 0.8mg QHS   - Urology recs     Major depression.  - chronic  - recently increased depression, attempt to self harm the LUE before arriving to the ED  - 1:1 observation  - Psych f/u  - Continue Mirtazapine in the interim.    Benign prostatic hyperplasia.  -Continue home tamsulosin.    Bipolar disorder.  -Continue Depakote; Continue Risperdal for behavioral disorder, agitation.    ADHD.  -Continue clonidine.    T2DM (type 2 diabetes mellitus).   Takes metformin at home  - Hold home medication  - Start LDISS  - POCT QAC QHS, Hypoglycemia protocol   - A1C 7.2 09/2024.    Hypothyroidism.   - Continue home levothyroxine.    HLD (hyperlipidemia).   - Continue home atorvastatin.     GERD (gastroesophageal reflux disease).   -  Continue home protonix.    Testicular cancer.   - S/P B/L orchiectomy  - Continue home testosterone transdermal gel.    Need for prophylactic measure.   DVT PPx: Lovenox 40mg QD.  Dispo: pending rehab
35 y/o M with PMHx of Autism, testicular CA s/p orchiectomy, DM2, Impulse control disorder, HLD, BPH, hypothyroid, Factitious disorder, GERD, asthma, Intellectual disability presents to the ED c/o inabilty to urinate for last few hours - found to be in urinary retention 700 cc, mckeon placed.     Urinary retention.   patient admitted with same admission 2 days prior to this one at [place]  700 cc retained yesterday --> mckeon placed. cannot go back to his facility with mckeon, recurrenta dmissions need to discuss either placement or allowance of mckeon at group home   - plan for alycia due to keeping mckeon in   - UA unremarkable   - Continue home tamsulosin 0.8mg QHS   - Urology recs     Major depression.  - chronic  - recently increased depression, attempt to self harm the LUE before arriving to the ED  - 1:1 observation  - Psych f/u  - Continue Mirtazapine in the interim.    Benign prostatic hyperplasia.  -Continue home tamsulosin.    Bipolar disorder.  -Continue Depakote; Continue Risperdal for behavioral disorder, agitation.    ADHD.  -Continue clonidine.    T2DM (type 2 diabetes mellitus).   Takes metformin at home  - Hold home medication  - Start LDISS  - POCT QAC QHS, Hypoglycemia protocol   - A1C 7.2 09/2024.    Hypothyroidism.   - Continue home levothyroxine.    HLD (hyperlipidemia).   - Continue home atorvastatin.     GERD (gastroesophageal reflux disease).   -  Continue home protonix.    Testicular cancer.   - S/P B/L orchiectomy  - Continue home testosterone transdermal gel.    Need for prophylactic measure.   DVT PPx: Lovenox 40mg QD.  Dispo: pending rehab

## 2024-11-25 DIAGNOSIS — H61.23 IMPACTED CERUMEN, BILATERAL: ICD-10-CM

## 2024-11-25 DIAGNOSIS — H93.293 OTHER ABNORMAL AUDITORY PERCEPTIONS, BILATERAL: ICD-10-CM

## 2024-11-25 DIAGNOSIS — H90.3 SENSORINEURAL HEARING LOSS, BILATERAL: ICD-10-CM

## 2024-11-25 DIAGNOSIS — R06.83 SNORING: ICD-10-CM

## 2024-11-25 LAB
GLUCOSE BLDC GLUCOMTR-MCNC: 140 MG/DL — HIGH (ref 70–99)
GLUCOSE BLDC GLUCOMTR-MCNC: 159 MG/DL — HIGH (ref 70–99)
GLUCOSE BLDC GLUCOMTR-MCNC: 209 MG/DL — HIGH (ref 70–99)

## 2024-11-25 PROCEDURE — 99232 SBSQ HOSP IP/OBS MODERATE 35: CPT

## 2024-11-25 RX ORDER — ENOXAPARIN SODIUM 30 MG/.3ML
40 INJECTION SUBCUTANEOUS EVERY 12 HOURS
Refills: 0 | Status: DISCONTINUED | OUTPATIENT
Start: 2024-11-25 | End: 2024-11-29

## 2024-11-25 RX ADMIN — NYSTATIN 1 APPLICATION(S): 100000 POWDER TOPICAL at 21:28

## 2024-11-25 RX ADMIN — Medication 1000 MILLIGRAM(S): at 21:17

## 2024-11-25 RX ADMIN — ENOXAPARIN SODIUM 40 MILLIGRAM(S): 30 INJECTION SUBCUTANEOUS at 21:16

## 2024-11-25 RX ADMIN — Medication 15 MILLIGRAM(S): at 09:57

## 2024-11-25 RX ADMIN — TAMSULOSIN HYDROCHLORIDE 0.8 MILLIGRAM(S): 0.4 CAPSULE ORAL at 21:17

## 2024-11-25 RX ADMIN — MIRTAZAPINE 7.5 MILLIGRAM(S): 15 TABLET, FILM COATED ORAL at 21:18

## 2024-11-25 RX ADMIN — Medication 2 TABLET(S): at 21:17

## 2024-11-25 RX ADMIN — Medication 10 MILLIGRAM(S): at 21:17

## 2024-11-25 RX ADMIN — Medication 500 MILLIGRAM(S): at 14:29

## 2024-11-25 RX ADMIN — CLONIDINE HYDROCHLORIDE 0.3 MILLIGRAM(S): 0.3 TABLET ORAL at 09:57

## 2024-11-25 RX ADMIN — RISPERIDONE 4 MILLIGRAM(S): 4 TABLET ORAL at 21:17

## 2024-11-25 RX ADMIN — ACETAMINOPHEN, DIPHENHYDRAMINE HCL, PHENYLEPHRINE HCL 3 MILLIGRAM(S): 325; 25; 5 TABLET ORAL at 21:17

## 2024-11-25 RX ADMIN — RISPERIDONE 4 MILLIGRAM(S): 4 TABLET ORAL at 14:29

## 2024-11-25 RX ADMIN — NYSTATIN 1 APPLICATION(S): 100000 POWDER TOPICAL at 09:58

## 2024-11-25 RX ADMIN — Medication 325 MILLIGRAM(S): at 21:17

## 2024-11-25 RX ADMIN — CLONIDINE HYDROCHLORIDE 0.3 MILLIGRAM(S): 0.3 TABLET ORAL at 21:17

## 2024-11-25 RX ADMIN — Medication 2: at 16:43

## 2024-11-26 LAB
GLUCOSE BLDC GLUCOMTR-MCNC: 133 MG/DL — HIGH (ref 70–99)
GLUCOSE BLDC GLUCOMTR-MCNC: 156 MG/DL — HIGH (ref 70–99)
GLUCOSE BLDC GLUCOMTR-MCNC: 158 MG/DL — HIGH (ref 70–99)

## 2024-11-26 PROCEDURE — 99232 SBSQ HOSP IP/OBS MODERATE 35: CPT

## 2024-11-26 RX ORDER — CLONIDINE HYDROCHLORIDE 0.3 MG/1
0.2 TABLET ORAL
Refills: 0 | Status: DISCONTINUED | OUTPATIENT
Start: 2024-11-26 | End: 2024-11-27

## 2024-11-26 RX ADMIN — Medication 2 TABLET(S): at 20:53

## 2024-11-26 RX ADMIN — Medication 1: at 17:13

## 2024-11-26 RX ADMIN — ENOXAPARIN SODIUM 40 MILLIGRAM(S): 30 INJECTION SUBCUTANEOUS at 09:47

## 2024-11-26 RX ADMIN — Medication 325 MILLIGRAM(S): at 20:54

## 2024-11-26 RX ADMIN — Medication 10 MILLIGRAM(S): at 20:54

## 2024-11-26 RX ADMIN — Medication 1: at 12:00

## 2024-11-26 RX ADMIN — ENOXAPARIN SODIUM 40 MILLIGRAM(S): 30 INJECTION SUBCUTANEOUS at 20:53

## 2024-11-26 RX ADMIN — Medication 1000 MILLIGRAM(S): at 20:54

## 2024-11-26 RX ADMIN — ACETAMINOPHEN 500MG 650 MILLIGRAM(S): 500 TABLET, COATED ORAL at 19:54

## 2024-11-26 RX ADMIN — RISPERIDONE 4 MILLIGRAM(S): 4 TABLET ORAL at 20:54

## 2024-11-26 RX ADMIN — RISPERIDONE 4 MILLIGRAM(S): 4 TABLET ORAL at 13:51

## 2024-11-26 RX ADMIN — MIRTAZAPINE 7.5 MILLIGRAM(S): 15 TABLET, FILM COATED ORAL at 20:54

## 2024-11-26 RX ADMIN — CLONIDINE HYDROCHLORIDE 0.2 MILLIGRAM(S): 0.3 TABLET ORAL at 20:54

## 2024-11-26 RX ADMIN — Medication 15 MILLIGRAM(S): at 09:47

## 2024-11-26 RX ADMIN — Medication 500 MILLIGRAM(S): at 13:51

## 2024-11-26 RX ADMIN — TAMSULOSIN HYDROCHLORIDE 0.8 MILLIGRAM(S): 0.4 CAPSULE ORAL at 20:53

## 2024-11-26 RX ADMIN — CLONIDINE HYDROCHLORIDE 0.2 MILLIGRAM(S): 0.3 TABLET ORAL at 09:47

## 2024-11-26 NOTE — PHYSICAL THERAPY INITIAL EVALUATION ADULT - ASSISTIVE DEVICE FOR TRANSFER: GAIT, REHAB EVAL
rolling walker Erythromycin Counseling:  I discussed with the patient the risks of erythromycin including but not limited to GI upset, allergic reaction, drug rash, diarrhea, increase in liver enzymes, and yeast infections.

## 2024-11-26 NOTE — PHYSICAL THERAPY INITIAL EVALUATION ADULT - MODALITIES TREATMENT COMMENTS
pt left in bed supine post Eval @ pt request; bed alarm on; mckeon intact; 1:1 in progress; callbell in reach; pt instructed not to get up alone; call nursing for assist; alisha well; denied pain

## 2024-11-27 LAB
GLUCOSE BLDC GLUCOMTR-MCNC: 138 MG/DL — HIGH (ref 70–99)
GLUCOSE BLDC GLUCOMTR-MCNC: 152 MG/DL — HIGH (ref 70–99)
GLUCOSE BLDC GLUCOMTR-MCNC: 172 MG/DL — HIGH (ref 70–99)
GLUCOSE BLDC GLUCOMTR-MCNC: 195 MG/DL — HIGH (ref 70–99)

## 2024-11-27 PROCEDURE — 99232 SBSQ HOSP IP/OBS MODERATE 35: CPT

## 2024-11-27 RX ORDER — CLONIDINE HYDROCHLORIDE 0.3 MG/1
0.1 TABLET ORAL THREE TIMES A DAY
Refills: 0 | Status: DISCONTINUED | OUTPATIENT
Start: 2024-11-27 | End: 2024-11-29

## 2024-11-27 RX ORDER — PHENAZOPYRIDINE HCL 200 MG
100 TABLET ORAL EVERY 8 HOURS
Refills: 0 | Status: DISCONTINUED | OUTPATIENT
Start: 2024-11-27 | End: 2024-11-29

## 2024-11-27 RX ADMIN — Medication 10 MILLIGRAM(S): at 21:12

## 2024-11-27 RX ADMIN — NYSTATIN 1 APPLICATION(S): 100000 POWDER TOPICAL at 21:14

## 2024-11-27 RX ADMIN — Medication 50 MICROGRAM(S): at 08:24

## 2024-11-27 RX ADMIN — Medication 1: at 08:25

## 2024-11-27 RX ADMIN — Medication 325 MILLIGRAM(S): at 21:13

## 2024-11-27 RX ADMIN — Medication 1: at 17:07

## 2024-11-27 RX ADMIN — PANTOPRAZOLE SODIUM 40 MILLIGRAM(S): 40 TABLET, DELAYED RELEASE ORAL at 08:24

## 2024-11-27 RX ADMIN — MIRTAZAPINE 7.5 MILLIGRAM(S): 15 TABLET, FILM COATED ORAL at 21:13

## 2024-11-27 RX ADMIN — NYSTATIN 1 APPLICATION(S): 100000 POWDER TOPICAL at 10:35

## 2024-11-27 RX ADMIN — TAMSULOSIN HYDROCHLORIDE 0.8 MILLIGRAM(S): 0.4 CAPSULE ORAL at 21:12

## 2024-11-27 RX ADMIN — Medication 1000 MILLIGRAM(S): at 21:11

## 2024-11-27 RX ADMIN — ACETAMINOPHEN, DIPHENHYDRAMINE HCL, PHENYLEPHRINE HCL 3 MILLIGRAM(S): 325; 25; 5 TABLET ORAL at 21:13

## 2024-11-27 RX ADMIN — Medication 15 MILLIGRAM(S): at 08:26

## 2024-11-27 RX ADMIN — ACETAMINOPHEN 500MG 650 MILLIGRAM(S): 500 TABLET, COATED ORAL at 18:27

## 2024-11-27 RX ADMIN — ENOXAPARIN SODIUM 40 MILLIGRAM(S): 30 INJECTION SUBCUTANEOUS at 21:14

## 2024-11-27 RX ADMIN — Medication 2 TABLET(S): at 21:11

## 2024-11-27 RX ADMIN — CLONIDINE HYDROCHLORIDE 0.1 MILLIGRAM(S): 0.3 TABLET ORAL at 13:30

## 2024-11-27 RX ADMIN — ENOXAPARIN SODIUM 40 MILLIGRAM(S): 30 INJECTION SUBCUTANEOUS at 10:43

## 2024-11-27 RX ADMIN — RISPERIDONE 4 MILLIGRAM(S): 4 TABLET ORAL at 13:30

## 2024-11-27 RX ADMIN — RISPERIDONE 4 MILLIGRAM(S): 4 TABLET ORAL at 21:13

## 2024-11-27 RX ADMIN — Medication 500 MILLIGRAM(S): at 17:06

## 2024-11-28 LAB
GLUCOSE BLDC GLUCOMTR-MCNC: 123 MG/DL — HIGH (ref 70–99)
GLUCOSE BLDC GLUCOMTR-MCNC: 274 MG/DL — HIGH (ref 70–99)

## 2024-11-28 PROCEDURE — 99232 SBSQ HOSP IP/OBS MODERATE 35: CPT

## 2024-11-28 RX ADMIN — Medication 50 MICROGRAM(S): at 05:46

## 2024-11-28 RX ADMIN — CLONIDINE HYDROCHLORIDE 0.1 MILLIGRAM(S): 0.3 TABLET ORAL at 22:07

## 2024-11-28 RX ADMIN — NYSTATIN 1 APPLICATION(S): 100000 POWDER TOPICAL at 11:57

## 2024-11-28 RX ADMIN — RISPERIDONE 4 MILLIGRAM(S): 4 TABLET ORAL at 22:07

## 2024-11-28 RX ADMIN — Medication 1000 MILLIGRAM(S): at 22:06

## 2024-11-28 RX ADMIN — Medication 500 MILLIGRAM(S): at 14:23

## 2024-11-28 RX ADMIN — Medication 325 MILLIGRAM(S): at 22:06

## 2024-11-28 RX ADMIN — Medication 15 MILLIGRAM(S): at 11:57

## 2024-11-28 RX ADMIN — RISPERIDONE 4 MILLIGRAM(S): 4 TABLET ORAL at 14:24

## 2024-11-28 RX ADMIN — CLONIDINE HYDROCHLORIDE 0.1 MILLIGRAM(S): 0.3 TABLET ORAL at 14:24

## 2024-11-28 RX ADMIN — Medication 10 MILLIGRAM(S): at 22:06

## 2024-11-28 RX ADMIN — TAMSULOSIN HYDROCHLORIDE 0.8 MILLIGRAM(S): 0.4 CAPSULE ORAL at 22:06

## 2024-11-28 RX ADMIN — Medication 3: at 16:59

## 2024-11-28 RX ADMIN — MIRTAZAPINE 7.5 MILLIGRAM(S): 15 TABLET, FILM COATED ORAL at 22:06

## 2024-11-28 RX ADMIN — ENOXAPARIN SODIUM 40 MILLIGRAM(S): 30 INJECTION SUBCUTANEOUS at 11:59

## 2024-11-28 RX ADMIN — CLONIDINE HYDROCHLORIDE 0.1 MILLIGRAM(S): 0.3 TABLET ORAL at 05:46

## 2024-11-28 RX ADMIN — Medication 2 TABLET(S): at 22:05

## 2024-11-28 RX ADMIN — ENOXAPARIN SODIUM 40 MILLIGRAM(S): 30 INJECTION SUBCUTANEOUS at 22:05

## 2024-11-29 ENCOUNTER — TRANSCRIPTION ENCOUNTER (OUTPATIENT)
Age: 34
End: 2024-11-29

## 2024-11-29 VITALS
HEART RATE: 112 BPM | OXYGEN SATURATION: 96 % | DIASTOLIC BLOOD PRESSURE: 96 MMHG | RESPIRATION RATE: 18 BRPM | SYSTOLIC BLOOD PRESSURE: 143 MMHG | TEMPERATURE: 98 F

## 2024-11-29 LAB
GLUCOSE BLDC GLUCOMTR-MCNC: 128 MG/DL — HIGH (ref 70–99)
GLUCOSE BLDC GLUCOMTR-MCNC: 268 MG/DL — HIGH (ref 70–99)

## 2024-11-29 PROCEDURE — 99232 SBSQ HOSP IP/OBS MODERATE 35: CPT

## 2024-11-29 RX ORDER — CLONIDINE HYDROCHLORIDE 0.3 MG/1
1 TABLET ORAL
Qty: 0 | Refills: 0 | DISCHARGE
Start: 2024-11-29

## 2024-11-29 RX ADMIN — Medication 500 MILLIGRAM(S): at 16:25

## 2024-11-29 RX ADMIN — Medication 3: at 11:27

## 2024-11-29 RX ADMIN — RISPERIDONE 4 MILLIGRAM(S): 4 TABLET ORAL at 16:25

## 2024-11-29 RX ADMIN — Medication 15 MILLIGRAM(S): at 09:43

## 2024-11-29 RX ADMIN — CLONIDINE HYDROCHLORIDE 0.1 MILLIGRAM(S): 0.3 TABLET ORAL at 16:25

## 2024-11-29 RX ADMIN — ENOXAPARIN SODIUM 40 MILLIGRAM(S): 30 INJECTION SUBCUTANEOUS at 09:43

## 2024-11-29 NOTE — PROGRESS NOTE ADULT - SUBJECTIVE AND OBJECTIVE BOX
CC: Patient is a 34y old  Male who presents with a chief complaint of     INTERVAL EVENTS: ANJANA    SUBJECTIVE / INTERVAL HPI: Patient seen and examined at bedside. Patient pleasant this morning no complaints    ROS: negative unless otherwise stated above.    VITAL SIGNS:  Vital Signs Last 24 Hrs  T(C): 36.8 (23 Nov 2024 11:36), Max: 36.8 (23 Nov 2024 07:15)  T(F): 98.2 (23 Nov 2024 11:36), Max: 98.2 (23 Nov 2024 07:15)  HR: 73 (23 Nov 2024 11:36) (72 - 78)  BP: 119/75 (23 Nov 2024 11:36) (77/47 - 119/75)  BP(mean): --  RR: 18 (23 Nov 2024 11:36) (17 - 18)  SpO2: 93% (23 Nov 2024 11:36) (92% - 98%)    Parameters below as of 23 Nov 2024 11:36  Patient On (Oxygen Delivery Method): room air            PHYSICAL EXAM:    General: NAD  HEENT: MMM  Neck: supple  Cardiovascular: +S1/S2; RRR  Respiratory: CTA B/L; no W/R/R  Gastrointestinal: soft, NT/ND mckeon draining clear yellow urine  Extremities: WWP; no edema, clubbing or cyanosis  Vascular: 2+ radial, DP/PT pulses B/L  Neurological: AAOx3; no focal deficits    MEDICATIONS:  MEDICATIONS  (STANDING):  atorvastatin 10 milliGRAM(s) Oral at bedtime  busPIRone 15 milliGRAM(s) Oral <User Schedule>  cloNIDine 0.3 milliGRAM(s) Oral every 12 hours  dextrose 5%. 1000 milliLiter(s) (50 mL/Hr) IV Continuous <Continuous>  dextrose 5%. 1000 milliLiter(s) (100 mL/Hr) IV Continuous <Continuous>  dextrose 50% Injectable 25 Gram(s) IV Push once  dextrose 50% Injectable 12.5 Gram(s) IV Push once  dextrose 50% Injectable 25 Gram(s) IV Push once  divalproex ER 1000 milliGRAM(s) Oral at bedtime  divalproex  milliGRAM(s) Oral daily  enoxaparin Injectable 40 milliGRAM(s) SubCutaneous every 24 hours  ferrous    sulfate 325 milliGRAM(s) Oral at bedtime  glucagon  Injectable 1 milliGRAM(s) IntraMuscular once  insulin lispro (ADMELOG) corrective regimen sliding scale   SubCutaneous three times a day before meals  levothyroxine 50 MICROGram(s) Oral daily  lidocaine 2% Gel 1 Application(s) Topical at bedtime  mirtazapine 7.5 milliGRAM(s) Oral at bedtime  pantoprazole    Tablet 40 milliGRAM(s) Oral before breakfast  risperiDONE   Tablet 4 milliGRAM(s) Oral two times a day  senna 2 Tablet(s) Oral at bedtime  tamsulosin 0.8 milliGRAM(s) Oral at bedtime    MEDICATIONS  (PRN):  acetaminophen     Tablet .. 650 milliGRAM(s) Oral every 6 hours PRN Mild Pain (1 - 3)  aluminum hydroxide/magnesium hydroxide/simethicone Suspension 30 milliLiter(s) Oral every 4 hours PRN Dyspepsia  dextrose Oral Gel 15 Gram(s) Oral once PRN Blood Glucose LESS THAN 70 milliGRAM(s)/deciliter  LORazepam   Injectable 1 milliGRAM(s) IV Push every 4 hours PRN Agitation  melatonin 3 milliGRAM(s) Oral at bedtime PRN Insomnia  ondansetron Injectable 4 milliGRAM(s) IV Push every 6 hours PRN Nausea and/or Vomiting      ALLERGIES:  Allergies    Celebrex (Short breath)  Zyprexa (Unknown)  Haldol (Rash)  Bee stings (Unknown)  Haldol (Other)  Zyprexa (Dystonic RXN)    Intolerances        LABS:              CAPILLARY BLOOD GLUCOSE      POCT Blood Glucose.: 185 mg/dL (23 Nov 2024 11:51)      RADIOLOGY & ADDITIONAL TESTS: Reviewed.
CC: Urine retention    S:  11/25: Lying in bed, awake, alert, offers no complaints, awaiting discharge planning.    REVIEW OF SYSTEMS: All other review of systems is negative unless indicated above.    Vital Signs Last 24 Hrs  T(C): 36.4 (25 Nov 2024 08:35), Max: 36.6 (24 Nov 2024 21:21)  T(F): 97.6 (25 Nov 2024 08:35), Max: 97.9 (24 Nov 2024 21:21)  HR: 71 (25 Nov 2024 08:35) (71 - 76)  BP: 120/53 (25 Nov 2024 08:35) (111/75 - 122/78)  BP(mean): --  RR: 18 (25 Nov 2024 08:35) (18 - 18)  SpO2: 96% (25 Nov 2024 08:35) (94% - 96%)    Parameters below as of 25 Nov 2024 08:35  Patient On (Oxygen Delivery Method): room air      PHYSICAL EXAM:    Constitutional: NAD, awake and alert  HEENT: PERR, EOMI, Normal Hearing, MMM  Neck: Soft and supple  Respiratory: Breath sounds are clear bilaterally, No wheezing, rales or rhonchi  Cardiovascular: S1 and S2, regular rate and rhythm, no Murmurs, gallops or rubs  Gastrointestinal: Bowel Sounds present, soft, nontender, nondistended, no guarding, no rebound  Extremities: No peripheral edema  : Mckeon in place, draining  Neurological: A/O x 3, no focal deficits in my limited exam      med/labs: Reviewed and interpreted       Assessment and Plan:   	  35 y/o M with PMHx of Autism, testicular CA s/p orchiectomy, DM2, Impulse control disorder, HLD, BPH, hypothyroid, Factitious disorder, GERD, asthma, Intellectual disability presents to the ED c/o inabilty to urinate for last few hours - found to be in urinary retention 700 cc, mckeon placed.    #Urinary retention.   patient admitted with same admission 2 days prior to this one at [place]  700 cc retained --> mckeon placed. cannot go back to his facility with mckeon, recurrenta dmissions need to discuss either placement or allowance of mckeon at group home   - plan for [facility]cia due to keeping mckeon in with outpatient tov   - UA unremarkable   - Continue home tamsulosin 0.8mg QHS   - Urology recs     Major depression.  - chronic  - recently increased depression, attempt to self harm the LUE before arriving to the ED  - needs 1:1 observation  - Continue Mirtazapine in the interim.    Benign prostatic hyperplasia.  -Continue home tamsulosin.    Bipolar disorder.  -Continue Depakote; Continue Risperdal for behavioral disorder, agitation.    ADHD.  -Continue clonidine.    T2DM (type 2 diabetes mellitus).   Takes metformin at home  - Hold home medication  - Start LDISS  - POCT QAC QHS, Hypoglycemia protocol   - A1C 7.2 09/2024.    Hypothyroidism.   - Continue home levothyroxine.    HLD (hyperlipidemia).   - Continue home atorvastatin.     GERD (gastroesophageal reflux disease).   -  Continue home protonix.    Testicular cancer.   - S/P B/L orchiectomy  - Continue home testosterone transdermal gel.    Need for prophylactic measure.   DVT PPx: Lovenox 40mg QD.  Dispo: pending rehab      
CC: Patient is a 34y old  Male who presents with a chief complaint of     INTERVAL EVENTS: ANJANA    SUBJECTIVE / INTERVAL HPI: Patient seen and examined at bedside. Patient states he feels okay at this time     ROS: negative unless otherwise stated above.    VITAL SIGNS:  Vital Signs Last 24 Hrs  T(C): 36.3 (22 Nov 2024 08:11), Max: 36.6 (21 Nov 2024 15:50)  T(F): 97.3 (22 Nov 2024 08:11), Max: 97.9 (21 Nov 2024 15:50)  HR: 88 (22 Nov 2024 08:11) (85 - 88)  BP: 125/67 (22 Nov 2024 08:11) (125/67 - 135/66)  BP(mean): --  RR: 18 (22 Nov 2024 08:11) (18 - 18)  SpO2: 94% (22 Nov 2024 08:11) (94% - 98%)    Parameters below as of 22 Nov 2024 08:11  Patient On (Oxygen Delivery Method): room air          11-21-24 @ 07:01  -  11-22-24 @ 07:00  --------------------------------------------------------  IN: 0 mL / OUT: 1900 mL / NET: -1900 mL        PHYSICAL EXAM:    General: NAD  HEENT: MMM  Neck: supple  Cardiovascular: +S1/S2; RRR  Respiratory: CTA B/L; no W/R/R  Gastrointestinal: soft, NT/ND mckeon draining clear yellow urine   Extremities: WWP; no edema, clubbing or cyanosis  Vascular: 2+ radial, DP/PT pulses B/L  Neurological: AAOx3; no focal deficits    MEDICATIONS:  MEDICATIONS  (STANDING):  atorvastatin 10 milliGRAM(s) Oral at bedtime  busPIRone 15 milliGRAM(s) Oral <User Schedule>  cloNIDine 0.3 milliGRAM(s) Oral every 12 hours  dextrose 5%. 1000 milliLiter(s) (50 mL/Hr) IV Continuous <Continuous>  dextrose 5%. 1000 milliLiter(s) (100 mL/Hr) IV Continuous <Continuous>  dextrose 50% Injectable 25 Gram(s) IV Push once  dextrose 50% Injectable 12.5 Gram(s) IV Push once  dextrose 50% Injectable 25 Gram(s) IV Push once  divalproex ER 1000 milliGRAM(s) Oral at bedtime  divalproex  milliGRAM(s) Oral daily  enoxaparin Injectable 40 milliGRAM(s) SubCutaneous every 24 hours  ferrous    sulfate 325 milliGRAM(s) Oral at bedtime  glucagon  Injectable 1 milliGRAM(s) IntraMuscular once  insulin lispro (ADMELOG) corrective regimen sliding scale   SubCutaneous three times a day before meals  levothyroxine 50 MICROGram(s) Oral daily  lidocaine 2% Gel 1 Application(s) Topical at bedtime  mirtazapine 7.5 milliGRAM(s) Oral at bedtime  pantoprazole    Tablet 40 milliGRAM(s) Oral before breakfast  risperiDONE   Tablet 4 milliGRAM(s) Oral two times a day  senna 2 Tablet(s) Oral at bedtime  tamsulosin 0.8 milliGRAM(s) Oral at bedtime    MEDICATIONS  (PRN):  acetaminophen     Tablet .. 650 milliGRAM(s) Oral every 6 hours PRN Mild Pain (1 - 3)  aluminum hydroxide/magnesium hydroxide/simethicone Suspension 30 milliLiter(s) Oral every 4 hours PRN Dyspepsia  dextrose Oral Gel 15 Gram(s) Oral once PRN Blood Glucose LESS THAN 70 milliGRAM(s)/deciliter  LORazepam   Injectable 1 milliGRAM(s) IV Push every 4 hours PRN Agitation  melatonin 3 milliGRAM(s) Oral at bedtime PRN Insomnia  ondansetron Injectable 4 milliGRAM(s) IV Push every 6 hours PRN Nausea and/or Vomiting      ALLERGIES:  Allergies    Celebrex (Short breath)  Zyprexa (Unknown)  Haldol (Rash)  Bee stings (Unknown)  Haldol (Other)  Zyprexa (Dystonic RXN)    Intolerances        LABS:                        12.1   8.55  )-----------( 222      ( 21 Nov 2024 00:25 )             39.1     11-21    135  |  102  |  13  ----------------------------<  198[H]  4.3   |  30  |  1.09    Ca    9.1      21 Nov 2024 00:25    TPro  6.4  /  Alb  3.1[L]  /  TBili  0.2  /  DBili  x   /  AST  12[L]  /  ALT  32  /  AlkPhos  67  11-21      Urinalysis Basic - ( 21 Nov 2024 00:25 )    Color: x / Appearance: x / SG: x / pH: x  Gluc: 198 mg/dL / Ketone: x  / Bili: x / Urobili: x   Blood: x / Protein: x / Nitrite: x   Leuk Esterase: x / RBC: x / WBC x   Sq Epi: x / Non Sq Epi: x / Bacteria: x      CAPILLARY BLOOD GLUCOSE      POCT Blood Glucose.: 140 mg/dL (22 Nov 2024 09:21)      RADIOLOGY & ADDITIONAL TESTS: Reviewed.
CC: Urine retention    S:  11/25: Lying in bed, awake, alert, offers no complaints, awaiting discharge planning.  11/26: No new events, status quo.    REVIEW OF SYSTEMS: All other review of systems is negative unless indicated above.      Vital Signs Last 24 Hrs  T(C): 36.9 (26 Nov 2024 07:59), Max: 36.9 (26 Nov 2024 07:59)  T(F): 98.4 (26 Nov 2024 07:59), Max: 98.4 (26 Nov 2024 07:59)  HR: 61 (26 Nov 2024 07:59) (61 - 80)  BP: 100/31 (26 Nov 2024 07:59) (94/51 - 123/56)  BP(mean): --  RR: 18 (26 Nov 2024 07:59) (18 - 18)  SpO2: 97% (26 Nov 2024 07:59) (95% - 97%)    Parameters below as of 26 Nov 2024 07:59  Patient On (Oxygen Delivery Method): room air        PHYSICAL EXAM:    Constitutional: NAD, awake and alert  HEENT: PERR, EOMI, Normal Hearing, MMM  Neck: Soft and supple  Respiratory: Breath sounds are clear bilaterally, No wheezing, rales or rhonchi  Cardiovascular: S1 and S2, regular rate and rhythm, no Murmurs, gallops or rubs  Gastrointestinal: Bowel Sounds present, soft, nontender, nondistended, no guarding, no rebound  Extremities: No peripheral edema  : Mckeon in place, draining  Neurological: A/O x 3, no focal deficits in my limited exam      med/labs: Reviewed and interpreted       Assessment and Plan:       35 y/o M with PMHx of Autism, testicular CA s/p orchiectomy, DM2, Impulse control disorder, HLD, BPH, hypothyroid, Factitious disorder, GERD, asthma, Intellectual disability presents to the ED c/o inabilty to urinate for last few hours - found to be in urinary retention 700 cc, mckeon placed.    #Urinary retention.   patient admitted with same admission 2 days prior to this one at [place]  700 cc retained --> mckeon placed. cannot go back to his facility with mckeon, recurrenta dmissions need to discuss either placement or allowance of mckeon at group home   - plan for alycia due to keeping mckeon in with outpatient tov   - UA unremarkable   - Continue home tamsulosin 0.8mg QHS   - Urology recs     Major depression.  - chronic  - recently increased depression, attempt to self harm the LUE before arriving to the ED  - needs 1:1 observation  - Continue Mirtazapine in the interim.    Benign prostatic hyperplasia.  -Continue home tamsulosin.    Bipolar disorder.  -Continue Depakote; Continue Risperdal for behavioral disorder, agitation.    ADHD.  -Continue clonidine.    T2DM (type 2 diabetes mellitus).   Takes metformin at home  - Hold home medication  - Start LDISS  - POCT QAC QHS, Hypoglycemia protocol   - A1C 7.2 09/2024.    Hypothyroidism.   - Continue home levothyroxine.    HLD (hyperlipidemia).   - Continue home atorvastatin.     GERD (gastroesophageal reflux disease).   -  Continue home protonix.    Testicular cancer.   - S/P B/L orchiectomy  - Continue home testosterone transdermal gel.    Need for prophylactic measure.   DVT PPx: Lovenox 40mg QD.  Dispo: pending rehab  
CC: Urine retention    S:  11/25: Lying in bed, awake, alert, offers no complaints, awaiting discharge planning.  11/26: No new events, status quo.  11/27: status quo, no new events, BP borderline low, pt asymptomatic.       REVIEW OF SYSTEMS: All other review of systems is negative unless indicated above.      Vital Signs Last 24 Hrs  T(C): 36.8 (27 Nov 2024 08:26), Max: 36.8 (27 Nov 2024 00:45)  T(F): 98.3 (27 Nov 2024 08:26), Max: 98.3 (27 Nov 2024 08:26)  HR: 75 (27 Nov 2024 08:26) (72 - 78)  BP: 93/52 (27 Nov 2024 08:26) (93/52 - 113/67)  BP(mean): --  RR: 18 (27 Nov 2024 08:26) (18 - 19)  SpO2: 97% (27 Nov 2024 08:26) (95% - 97%)    Parameters below as of 27 Nov 2024 08:26  Patient On (Oxygen Delivery Method): room air      PHYSICAL EXAM:    Constitutional: NAD, awake and alert  HEENT: PERR, EOMI, Normal Hearing, MMM  Neck: Soft and supple  Respiratory: Breath sounds are clear bilaterally, No wheezing, rales or rhonchi  Cardiovascular: S1 and S2, regular rate and rhythm, no Murmurs, gallops or rubs  Gastrointestinal: Bowel Sounds present, soft, nontender, nondistended, no guarding, no rebound  Extremities: No peripheral edema  : Mckeon in place, draining  Neurological: A/O x 3, no focal deficits in my limited exam      MEDICATIONS  (STANDING):  atorvastatin 10 milliGRAM(s) Oral at bedtime  busPIRone 15 milliGRAM(s) Oral <User Schedule>  cloNIDine 0.1 milliGRAM(s) Oral three times a day  dextrose 5%. 1000 milliLiter(s) (50 mL/Hr) IV Continuous <Continuous>  dextrose 5%. 1000 milliLiter(s) (100 mL/Hr) IV Continuous <Continuous>  dextrose 50% Injectable 25 Gram(s) IV Push once  dextrose 50% Injectable 12.5 Gram(s) IV Push once  dextrose 50% Injectable 25 Gram(s) IV Push once  divalproex ER 1000 milliGRAM(s) Oral at bedtime  divalproex  milliGRAM(s) Oral daily  enoxaparin Injectable 40 milliGRAM(s) SubCutaneous every 12 hours  ferrous    sulfate 325 milliGRAM(s) Oral at bedtime  glucagon  Injectable 1 milliGRAM(s) IntraMuscular once  insulin lispro (ADMELOG) corrective regimen sliding scale   SubCutaneous three times a day before meals  levothyroxine 50 MICROGram(s) Oral daily  lidocaine 2% Gel 1 Application(s) Topical at bedtime  mirtazapine 7.5 milliGRAM(s) Oral at bedtime  nystatin Powder 1 Application(s) Topical two times a day  pantoprazole    Tablet 40 milliGRAM(s) Oral before breakfast  risperiDONE   Tablet 4 milliGRAM(s) Oral two times a day  senna 2 Tablet(s) Oral at bedtime  tamsulosin 0.8 milliGRAM(s) Oral at bedtime    MEDICATIONS  (PRN):  acetaminophen     Tablet .. 650 milliGRAM(s) Oral every 6 hours PRN Mild Pain (1 - 3)  aluminum hydroxide/magnesium hydroxide/simethicone Suspension 30 milliLiter(s) Oral every 4 hours PRN Dyspepsia  dextrose Oral Gel 15 Gram(s) Oral once PRN Blood Glucose LESS THAN 70 milliGRAM(s)/deciliter  LORazepam   Injectable 1 milliGRAM(s) IV Push every 4 hours PRN Agitation  melatonin 3 milliGRAM(s) Oral at bedtime PRN Insomnia  ondansetron Injectable 4 milliGRAM(s) IV Push every 6 hours PRN Nausea and/or Vomiting        CAPILLARY BLOOD GLUCOSE      POCT Blood Glucose.: 138 mg/dL (27 Nov 2024 10:59)  POCT Blood Glucose.: 152 mg/dL (27 Nov 2024 08:13)  POCT Blood Glucose.: 158 mg/dL (26 Nov 2024 17:02)        Assessment and Plan:   33 y/o M with PMHx of Autism, testicular CA s/p orchiectomy, DM2, Impulse control disorder, HLD, BPH, hypothyroid, Factitious disorder, GERD, asthma, Intellectual disability presents to the ED c/o inabilty to urinate for last few hours - found to be in urinary retention 700 cc, mckeon placed.    #Urinary retention.   patient admitted with same admission 2 days prior to this one at [place]  700 cc retained --> mckeon placed. cannot go back to his facility with mckeon, recurrenta dmissions need to discuss either placement or allowance of mckeon at group home   - plan for alycia due to keeping mckeon in with outpatient tov   - UA unremarkable   - Continue home tamsulosin 0.8mg QHS   - Urology recs     Major depression.  - chronic  - recently increased depression, attempt to self harm the LUE before arriving to the ED  - needs 1:1 observation  - Continue Mirtazapine in the interim.    Benign prostatic hyperplasia.  -Continue home tamsulosin.    Bipolar disorder.  -Continue Depakote; Continue Risperdal for behavioral disorder, agitation.    HTN: BP borderline low  - reduce clonidine 0.3 --> 0.2 --> 0.1mg TID    ADHD.  -Continue clonidine.    T2DM (type 2 diabetes mellitus).   Takes metformin at home  - Hold home medication  - Start LDISS  - POCT QAC QHS, Hypoglycemia protocol   - A1C 7.2 09/2024.    Hypothyroidism.   - Continue home levothyroxine.    HLD (hyperlipidemia).   - Continue home atorvastatin.     GERD (gastroesophageal reflux disease).   -  Continue home protonix.    Testicular cancer.   - S/P B/L orchiectomy  - Continue home testosterone transdermal gel.    Need for prophylactic measure.   DVT PPx: Lovenox 40mg QD.  Dispo: pending rehab          Electronic Signatures:  Dany Rocha)  (Signed 26-Nov-2024 12:56)  	Authored: Progress Note, Reason for Admission, Subjective and Objective      Last Updated: 26-Nov-2024 12:56 by Dany Rocha ()  
CC: Urine retention    S:  11/25: Lying in bed, awake, alert, offers no complaints, awaiting discharge planning.  11/26: No new events, status quo.  11/27: status quo, no new events, BP borderline low, pt asymptomatic.  11/28: Seen ambulating hallway, calm, comfortable, mckeon in place.  Did have some penile pain overnight.     REVIEW OF SYSTEMS: All other review of systems is negative unless indicated above.      Vital Signs Last 24 Hrs  T(C): 36.3 (28 Nov 2024 09:20), Max: 36.8 (28 Nov 2024 05:40)  T(F): 97.4 (28 Nov 2024 09:20), Max: 98.2 (28 Nov 2024 05:40)  HR: 65 (28 Nov 2024 09:20) (65 - 99)  BP: 112/53 (28 Nov 2024 09:20) (110/59 - 127/98)  BP(mean): --  RR: 18 (28 Nov 2024 09:20) (17 - 18)  SpO2: 94% (28 Nov 2024 09:20) (94% - 97%)    Parameters below as of 28 Nov 2024 09:20  Patient On (Oxygen Delivery Method): room air      PHYSICAL EXAM:    Constitutional: NAD, awake and alert  HEENT: PERR, EOMI, Normal Hearing, MMM  Neck: Soft and supple  Respiratory: Breath sounds are clear bilaterally, No wheezing, rales or rhonchi  Cardiovascular: S1 and S2, regular rate and rhythm, no Murmurs, gallops or rubs  Gastrointestinal: Bowel Sounds present, soft, nontender, nondistended, no guarding, no rebound  Extremities: No peripheral edema  : Mckeon in place, draining  Neurological: A/O x 3, no focal deficits in my limited exam      MEDICATIONS  (STANDING):  atorvastatin 10 milliGRAM(s) Oral at bedtime  busPIRone 15 milliGRAM(s) Oral <User Schedule>  cloNIDine 0.1 milliGRAM(s) Oral three times a day  dextrose 5%. 1000 milliLiter(s) (100 mL/Hr) IV Continuous <Continuous>  dextrose 5%. 1000 milliLiter(s) (50 mL/Hr) IV Continuous <Continuous>  dextrose 50% Injectable 25 Gram(s) IV Push once  dextrose 50% Injectable 12.5 Gram(s) IV Push once  dextrose 50% Injectable 25 Gram(s) IV Push once  divalproex ER 1000 milliGRAM(s) Oral at bedtime  divalproex  milliGRAM(s) Oral daily  enoxaparin Injectable 40 milliGRAM(s) SubCutaneous every 12 hours  ferrous    sulfate 325 milliGRAM(s) Oral at bedtime  glucagon  Injectable 1 milliGRAM(s) IntraMuscular once  insulin lispro (ADMELOG) corrective regimen sliding scale   SubCutaneous three times a day before meals  levothyroxine 50 MICROGram(s) Oral daily  lidocaine 2% Gel 1 Application(s) Topical at bedtime  mirtazapine 7.5 milliGRAM(s) Oral at bedtime  nystatin Powder 1 Application(s) Topical two times a day  pantoprazole    Tablet 40 milliGRAM(s) Oral before breakfast  risperiDONE   Tablet 4 milliGRAM(s) Oral two times a day  senna 2 Tablet(s) Oral at bedtime  tamsulosin 0.8 milliGRAM(s) Oral at bedtime    MEDICATIONS  (PRN):  acetaminophen     Tablet .. 650 milliGRAM(s) Oral every 6 hours PRN Mild Pain (1 - 3)  aluminum hydroxide/magnesium hydroxide/simethicone Suspension 30 milliLiter(s) Oral every 4 hours PRN Dyspepsia  dextrose Oral Gel 15 Gram(s) Oral once PRN Blood Glucose LESS THAN 70 milliGRAM(s)/deciliter  LORazepam   Injectable 1 milliGRAM(s) IV Push every 4 hours PRN Agitation  melatonin 3 milliGRAM(s) Oral at bedtime PRN Insomnia  ondansetron Injectable 4 milliGRAM(s) IV Push every 6 hours PRN Nausea and/or Vomiting  phenazopyridine 100 milliGRAM(s) Oral every 8 hours PRN bladder spasm          CAPILLARY BLOOD GLUCOSE      POCT Blood Glucose.: 123 mg/dL (28 Nov 2024 11:54)  POCT Blood Glucose.: 172 mg/dL (27 Nov 2024 21:06)  POCT Blood Glucose.: 195 mg/dL (27 Nov 2024 16:52)          Assessment and Plan:   35 y/o M with PMHx of Autism, testicular CA s/p orchiectomy, DM2, Impulse control disorder, HLD, BPH, hypothyroid, Factitious disorder, GERD, asthma, Intellectual disability presents to the ED c/o inabilty to urinate for last few hours - found to be in urinary retention 700 cc, mckeon placed.    #Urinary retention.   patient admitted with same admission 2 days prior to this one at [place]  700 cc retained --> mckeon placed. cannot go back to his facility with mckeon, recurrenta dmissions need to discuss either placement or allowance of mckeon at group home   - plan for alycia due to keeping mckeon in with outpatient tov   - UA unremarkable   - Continue home tamsulosin 0.8mg QHS   - Urology recs     Major depression.  - chronic  - recently increased depression, attempt to self harm the LUE before arriving to the ED  - needs 1:1 observation  - Continue Mirtazapine in the interim.    Benign prostatic hyperplasia.  -Continue home tamsulosin.    Bipolar disorder.  -Continue Depakote; Continue Risperdal for behavioral disorder, agitation.    HTN: BP borderline low  - reduce clonidine 0.3 --> 0.2 --> 0.1mg TID    ADHD.  -Continue clonidine.    T2DM (type 2 diabetes mellitus).   Takes metformin at home  - Hold home medication  - Start LDISS  - POCT QAC QHS, Hypoglycemia protocol   - A1C 7.2 09/2024.    Hypothyroidism.   - Continue home levothyroxine.    HLD (hyperlipidemia).   - Continue home atorvastatin.     GERD (gastroesophageal reflux disease).   -  Continue home protonix.    Testicular cancer.   - S/P B/L orchiectomy  - Continue home testosterone transdermal gel.    Need for prophylactic measure.   DVT PPx: Lovenox 40mg QD.  Dispo: pending rehab    
CC: Patient is a 34y old  Male who presents with a chief complaint of     INTERVAL EVENTS: ANJANA    SUBJECTIVE / INTERVAL HPI: Patient seen and examined at bedside.     ROS: negative unless otherwise stated above.    VITAL SIGNS:  Vital Signs Last 24 Hrs  T(C): 36.4 (24 Nov 2024 07:15), Max: 37 (23 Nov 2024 16:37)  T(F): 97.5 (24 Nov 2024 07:15), Max: 98.6 (23 Nov 2024 16:37)  HR: 63 (24 Nov 2024 07:15) (63 - 73)  BP: 105/66 (24 Nov 2024 10:06) (87/59 - 119/75)  BP(mean): --  RR: 18 (24 Nov 2024 07:15) (18 - 18)  SpO2: 95% (24 Nov 2024 07:15) (93% - 95%)    Parameters below as of 24 Nov 2024 07:15  Patient On (Oxygen Delivery Method): room air          11-23-24 @ 07:01  -  11-24-24 @ 07:00  --------------------------------------------------------  IN: 0 mL / OUT: 1375 mL / NET: -1375 mL        PHYSICAL EXAM:    General: NAD flat affect  HEENT: MMM  Neck: supple  Cardiovascular: +S1/S2; RRR  Respiratory: CTA B/L; no W/R/R  Gastrointestinal: soft, NT/ND  Extremities: WWP; no edema, clubbing or cyanosis  Vascular: 2+ radial, DP/PT pulses B/L  Neurological: AAOx3; no focal deficits    MEDICATIONS:  MEDICATIONS  (STANDING):  atorvastatin 10 milliGRAM(s) Oral at bedtime  busPIRone 15 milliGRAM(s) Oral <User Schedule>  cloNIDine 0.3 milliGRAM(s) Oral every 12 hours  dextrose 5%. 1000 milliLiter(s) (50 mL/Hr) IV Continuous <Continuous>  dextrose 5%. 1000 milliLiter(s) (100 mL/Hr) IV Continuous <Continuous>  dextrose 50% Injectable 25 Gram(s) IV Push once  dextrose 50% Injectable 12.5 Gram(s) IV Push once  dextrose 50% Injectable 25 Gram(s) IV Push once  divalproex ER 1000 milliGRAM(s) Oral at bedtime  divalproex  milliGRAM(s) Oral daily  enoxaparin Injectable 40 milliGRAM(s) SubCutaneous every 24 hours  ferrous    sulfate 325 milliGRAM(s) Oral at bedtime  glucagon  Injectable 1 milliGRAM(s) IntraMuscular once  insulin lispro (ADMELOG) corrective regimen sliding scale   SubCutaneous three times a day before meals  levothyroxine 50 MICROGram(s) Oral daily  lidocaine 2% Gel 1 Application(s) Topical at bedtime  mirtazapine 7.5 milliGRAM(s) Oral at bedtime  pantoprazole    Tablet 40 milliGRAM(s) Oral before breakfast  risperiDONE   Tablet 4 milliGRAM(s) Oral two times a day  senna 2 Tablet(s) Oral at bedtime  tamsulosin 0.8 milliGRAM(s) Oral at bedtime    MEDICATIONS  (PRN):  acetaminophen     Tablet .. 650 milliGRAM(s) Oral every 6 hours PRN Mild Pain (1 - 3)  aluminum hydroxide/magnesium hydroxide/simethicone Suspension 30 milliLiter(s) Oral every 4 hours PRN Dyspepsia  dextrose Oral Gel 15 Gram(s) Oral once PRN Blood Glucose LESS THAN 70 milliGRAM(s)/deciliter  LORazepam   Injectable 1 milliGRAM(s) IV Push every 4 hours PRN Agitation  melatonin 3 milliGRAM(s) Oral at bedtime PRN Insomnia  ondansetron Injectable 4 milliGRAM(s) IV Push every 6 hours PRN Nausea and/or Vomiting      ALLERGIES:  Allergies    Celebrex (Short breath)  Zyprexa (Unknown)  Haldol (Rash)  Bee stings (Unknown)  Haldol (Other)  Zyprexa (Dystonic RXN)    Intolerances        LABS:              CAPILLARY BLOOD GLUCOSE      POCT Blood Glucose.: 136 mg/dL (24 Nov 2024 07:38)      RADIOLOGY & ADDITIONAL TESTS: Reviewed.
CC: Urine retention    S:  11/25: Lying in bed, awake, alert, offers no complaints, awaiting discharge planning.  11/26: No new events, status quo.  11/27: status quo, no new events, BP borderline low, pt asymptomatic.  11/28: Seen ambulating hallway, calm, comfortable, mckeon in place.  Did have some penile pain overnight.  11/29: Seen again ambulating, feels okay.  Asking when he will be discharged.    REVIEW OF SYSTEMS: All other review of systems is negative unless indicated above.      Vital Signs Last 24 Hrs  T(C): 37.2 (28 Nov 2024 22:00), Max: 37.2 (28 Nov 2024 22:00)  T(F): 99 (28 Nov 2024 22:00), Max: 99 (28 Nov 2024 22:00)  HR: 99 (28 Nov 2024 22:00) (78 - 99)  BP: 128/89 (28 Nov 2024 22:00) (119/73 - 128/89)  BP(mean): --  RR: 18 (28 Nov 2024 22:00) (18 - 18)  SpO2: 96% (28 Nov 2024 22:00) (96% - 96%)    Parameters below as of 28 Nov 2024 22:00  Patient On (Oxygen Delivery Method): room air      PHYSICAL EXAM:    Constitutional: NAD, awake and alert  HEENT: PERR, EOMI, Normal Hearing, MMM  Neck: Soft and supple  Respiratory: Breath sounds are clear bilaterally, No wheezing, rales or rhonchi  Cardiovascular: S1 and S2, regular rate and rhythm, no Murmurs, gallops or rubs  Gastrointestinal: Bowel Sounds present, soft, nontender, nondistended, no guarding, no rebound  Extremities: No peripheral edema  : Mckeon in place, draining  Neurological: A/O x 3, no focal deficits in my limited exam      MEDICATIONS  (STANDING):  atorvastatin 10 milliGRAM(s) Oral at bedtime  busPIRone 15 milliGRAM(s) Oral <User Schedule>  cloNIDine 0.1 milliGRAM(s) Oral three times a day  dextrose 5%. 1000 milliLiter(s) (100 mL/Hr) IV Continuous <Continuous>  dextrose 5%. 1000 milliLiter(s) (50 mL/Hr) IV Continuous <Continuous>  dextrose 50% Injectable 25 Gram(s) IV Push once  dextrose 50% Injectable 12.5 Gram(s) IV Push once  dextrose 50% Injectable 25 Gram(s) IV Push once  divalproex ER 1000 milliGRAM(s) Oral at bedtime  divalproex  milliGRAM(s) Oral daily  enoxaparin Injectable 40 milliGRAM(s) SubCutaneous every 12 hours  ferrous    sulfate 325 milliGRAM(s) Oral at bedtime  glucagon  Injectable 1 milliGRAM(s) IntraMuscular once  insulin lispro (ADMELOG) corrective regimen sliding scale   SubCutaneous three times a day before meals  levothyroxine 50 MICROGram(s) Oral daily  lidocaine 2% Gel 1 Application(s) Topical at bedtime  mirtazapine 7.5 milliGRAM(s) Oral at bedtime  nystatin Powder 1 Application(s) Topical two times a day  pantoprazole    Tablet 40 milliGRAM(s) Oral before breakfast  risperiDONE   Tablet 4 milliGRAM(s) Oral two times a day  senna 2 Tablet(s) Oral at bedtime  tamsulosin 0.8 milliGRAM(s) Oral at bedtime    MEDICATIONS  (PRN):  acetaminophen     Tablet .. 650 milliGRAM(s) Oral every 6 hours PRN Mild Pain (1 - 3)  aluminum hydroxide/magnesium hydroxide/simethicone Suspension 30 milliLiter(s) Oral every 4 hours PRN Dyspepsia  dextrose Oral Gel 15 Gram(s) Oral once PRN Blood Glucose LESS THAN 70 milliGRAM(s)/deciliter  melatonin 3 milliGRAM(s) Oral at bedtime PRN Insomnia  ondansetron Injectable 4 milliGRAM(s) IV Push every 6 hours PRN Nausea and/or Vomiting  phenazopyridine 100 milliGRAM(s) Oral every 8 hours PRN bladder spasm          CAPILLARY BLOOD GLUCOSE      POCT Blood Glucose.: 268 mg/dL (29 Nov 2024 11:23)  POCT Blood Glucose.: 128 mg/dL (29 Nov 2024 09:38)  POCT Blood Glucose.: 274 mg/dL (28 Nov 2024 16:50)  POCT Blood Glucose.: 123 mg/dL (28 Nov 2024 11:54)          Assessment and Plan:   35 y/o M with PMHx of Autism, testicular CA s/p orchiectomy, DM2, Impulse control disorder, HLD, BPH, hypothyroid, Factitious disorder, GERD, asthma, Intellectual disability presents to the ED c/o inabilty to urinate for last few hours - found to be in urinary retention 700 cc, mckeon placed.    #Urinary retention.   patient admitted with same admission 2 days prior to this one at [facility]  700 cc retained --> mckeon placed. cannot go back to his facility with mckeon, recurrenta dmissions need to discuss either placement or allowance of mckeon at group home   - plan for alycia due to keeping mckeon in with outpatient tov   - UA unremarkable   - Continue home tamsulosin 0.8mg QHS   - Urology recs     Major depression.  - chronic  - recently increased depression, attempt to self harm the LUE before arriving to the ED  - needs 1:1 observation  - Continue Mirtazapine in the interim.    Benign prostatic hyperplasia.  -Continue home tamsulosin.    Bipolar disorder.  -Continue Depakote; Continue Risperdal for behavioral disorder, agitation.      ADHD.  - BP borderline low --> reduce clonidine 0.3 --> 0.2 --> 0.1mg TID      T2DM (type 2 diabetes mellitus).   Takes metformin at home  - Hold home medication  - Start LDISS  - POCT QAC QHS, Hypoglycemia protocol   - A1C 7.2 09/2024.    Hypothyroidism.   - Continue home levothyroxine.      HLD (hyperlipidemia).   - Continue home atorvastatin.       GERD (gastroesophageal reflux disease).   -  Continue home protonix.      Testicular cancer.   - S/P B/L orchiectomy  - Continue home testosterone transdermal gel.      Need for prophylactic measure.   DVT PPx: Lovenox 40mg QD.  Dispo: pending rehab

## 2024-11-29 NOTE — DISCHARGE NOTE NURSING/CASE MANAGEMENT/SOCIAL WORK - PATIENT PORTAL LINK FT
You can access the FollowMyHealth Patient Portal offered by [facility] by registering at the following website: [email or web address]. By joining [employer]’s FollowMyHealth portal, you will also be able to view your health information using other applications (apps) compatible with our system.

## 2024-11-29 NOTE — DISCHARGE NOTE NURSING/CASE MANAGEMENT/SOCIAL WORK - NSDCVIVACCINE_GEN_ALL_CORE_FT
Tdap; 20-Dec-2018 12:21; Lo Anaya (RN); [employer]; [id number] (Exp. Date: 02-Nov-2020); IntraMuscular; Deltoid Left.; 0.5 milliLiter(s); VIS (VIS Published: 09-May-2013, VIS Presented: 20-Dec-2018);   Tdap; 26-Mar-2019 18:59; Shavon Barrios (RN); [employer]; [id number] (Exp. Date: 26-Feb-2021); IntraMuscular; Deltoid Left.; 0.5 milliLiter(s); VIS (VIS Published: 09-May-2013, VIS Presented: 26-Mar-2019);   Tdap; 01-Dec-2021 09:35; Zamzam Coulter (RN); [employer]; [id number] (Exp. Date: 09-Sep-2023); IntraMuscular; Deltoid Left.; 0.5 milliLiter(s); VIS (VIS Published: 09-May-2013, VIS Presented: 01-Dec-2021);   Tdap; 21-Jul-2022 01:28; Rose Hancock (RN); [employer]; [id number] (Exp. Date: 14-Oct-2024); IntraMuscular; Deltoid Right.; 0.5 milliLiter(s); VIS (VIS Published: 09-May-2013, VIS Presented: 21-Jul-2022);   Tdap; 12-Jun-2023 21:01; Diann Paul (RN); [employer]; [id number] (Exp. Date: 08-Mar-2025); IntraMuscular; Deltoid Left.; 0.5 milliLiter(s); VIS (VIS Published: 09-May-2013, VIS Presented: 12-Jun-2023);   Tdap; 24-Jul-2023 23:01; Diann Paul (RN); [employer]; [id number] (Exp. Date: 18-Aug-2025); IntraMuscular; Deltoid Right.; 0.5 milliLiter(s); VIS (VIS Published: 09-May-2013, VIS Presented: 24-Jul-2023);   Tdap; 09-Jun-2024 22:20; Dyllan Arevalo (RN); [employer]; [id number] (Exp. Date: 21-Nov-2024); IntraMuscular; Deltoid Right.; 0.5 milliLiter(s); VIS (VIS Published: 09-May-2013, VIS Presented: 09-Jun-2024);   Tdap; 24-Sep-2024 18:28; Luba Crocker (RN); [employer]; [id number] (Exp. Date: 31-May-2026); IntraMuscular; Deltoid Left.; 0.5 milliLiter(s); VIS (VIS Published: 09-May-2013, VIS Presented: 24-Sep-2024);

## 2024-11-29 NOTE — DISCHARGE NOTE NURSING/CASE MANAGEMENT/SOCIAL WORK - FINANCIAL ASSISTANCE
Is This A New Presentation, Or A Follow-Up?: Rash Additional History: Pt started a new cholesterol medication (unknown name) about a week ago. [facility] provides services at a reduced cost to those who are determined to be eligible through [facility]’s financial assistance program. Information regarding [facility]’s financial assistance program can be found by going to [email or web address] or by calling [phone].

## 2024-12-01 ENCOUNTER — EMERGENCY (EMERGENCY)
Facility: HOSPITAL | Age: 34
LOS: 0 days | Discharge: NURSING, REHAB OR LONG-TERM CARE | End: 2024-12-01
Attending: EMERGENCY MEDICINE
Payer: MEDICAID

## 2024-12-01 ENCOUNTER — EMERGENCY (EMERGENCY)
Facility: HOSPITAL | Age: 34
LOS: 0 days | Discharge: NURSING, REHAB OR LONG-TERM CARE | End: 2024-12-01
Attending: HOSPITALIST

## 2024-12-01 VITALS
WEIGHT: 295.86 LBS | OXYGEN SATURATION: 98 % | TEMPERATURE: 98 F | DIASTOLIC BLOOD PRESSURE: 84 MMHG | SYSTOLIC BLOOD PRESSURE: 154 MMHG | RESPIRATION RATE: 20 BRPM | HEART RATE: 93 BPM

## 2024-12-01 VITALS
SYSTOLIC BLOOD PRESSURE: 141 MMHG | RESPIRATION RATE: 20 BRPM | DIASTOLIC BLOOD PRESSURE: 91 MMHG | TEMPERATURE: 98 F | OXYGEN SATURATION: 100 % | HEIGHT: 69 IN | HEART RATE: 102 BPM

## 2024-12-01 VITALS
OXYGEN SATURATION: 100 % | HEART RATE: 88 BPM | RESPIRATION RATE: 16 BRPM | SYSTOLIC BLOOD PRESSURE: 125 MMHG | TEMPERATURE: 98 F | DIASTOLIC BLOOD PRESSURE: 74 MMHG

## 2024-12-01 DIAGNOSIS — F63.9 IMPULSE DISORDER, UNSPECIFIED: ICD-10-CM
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
## 2024-12-01 DIAGNOSIS — N40.0 BENIGN PROSTATIC HYPERPLASIA WITHOUT LOWER URINARY TRACT SYMPTOMS: ICD-10-CM

## 2024-12-01 DIAGNOSIS — F84.0 AUTISTIC DISORDER: ICD-10-CM

## 2024-12-01 DIAGNOSIS — Z88.6 ALLERGY STATUS TO ANALGESIC AGENT: ICD-10-CM

## 2024-12-01 DIAGNOSIS — J45.909 UNSPECIFIED ASTHMA, UNCOMPLICATED: ICD-10-CM

## 2024-12-01 DIAGNOSIS — Z88.8 ALLERGY STATUS TO OTHER DRUGS, MEDICAMENTS AND BIOLOGICAL SUBSTANCES: ICD-10-CM

## 2024-12-01 DIAGNOSIS — S50.812A ABRASION OF LEFT FOREARM, INITIAL ENCOUNTER: ICD-10-CM

## 2024-12-01 DIAGNOSIS — T83.091A OTHER MECHANICAL COMPLICATION OF INDWELLING URETHRAL CATHETER, INITIAL ENCOUNTER: ICD-10-CM

## 2024-12-01 DIAGNOSIS — Z79.84 LONG TERM (CURRENT) USE OF ORAL HYPOGLYCEMIC DRUGS: ICD-10-CM
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
## 2024-12-01 DIAGNOSIS — Z90.79 ACQUIRED ABSENCE OF OTHER GENITAL ORGAN(S): Chronic | ICD-10-CM

## 2024-12-01 DIAGNOSIS — E78.5 HYPERLIPIDEMIA, UNSPECIFIED: ICD-10-CM

## 2024-12-01 DIAGNOSIS — K21.9 GASTRO-ESOPHAGEAL REFLUX DISEASE WITHOUT ESOPHAGITIS: ICD-10-CM

## 2024-12-01 DIAGNOSIS — F79 UNSPECIFIED INTELLECTUAL DISABILITIES: ICD-10-CM

## 2024-12-01 DIAGNOSIS — E11.9 TYPE 2 DIABETES MELLITUS WITHOUT COMPLICATIONS: ICD-10-CM

## 2024-12-01 DIAGNOSIS — Z91.030 BEE ALLERGY STATUS: ICD-10-CM

## 2024-12-01 DIAGNOSIS — Z86.39 PERSONAL HISTORY OF OTHER ENDOCRINE, NUTRITIONAL AND METABOLIC DISEASE: ICD-10-CM

## 2024-12-01 PROCEDURE — 99284 EMERGENCY DEPT VISIT MOD MDM: CPT

## 2024-12-01 PROCEDURE — L9995: CPT

## 2024-12-01 PROCEDURE — 99282 EMERGENCY DEPT VISIT SF MDM: CPT

## 2024-12-01 RX ORDER — LIDOCAINE HYDROCHLORIDE 20 MG/ML
10 JELLY TOPICAL
Qty: 120 | Refills: 0
Start: 2024-12-01

## 2024-12-01 RX ORDER — BACITRACIN ZINC 500 UNIT/G
1 OINTMENT (GRAM) TOPICAL ONCE
Refills: 0 | Status: COMPLETED | OUTPATIENT
Start: 2024-12-01 | End: 2024-12-01

## 2024-12-01 RX ADMIN — Medication 1 APPLICATION(S): at 07:10

## 2024-12-01 NOTE — ED PROVIDER NOTE - PATIENT PORTAL LINK FT
You can access the FollowMyHealth Patient Portal offered by [facility] by registering at the following website: [email or web address]. By joining [employer]’s FollowMyHealth portal, you will also be able to view your health information using other applications (apps) compatible with our system.

## 2024-12-01 NOTE — ED PROVIDER NOTE - OBJECTIVE STATEMENT
34-year-old male returns back the ED for reevaluation.  Patient was being transferred back to his facility when he  bit his left arm intentionally.   Patient with history of  impulse control disorder and intellectual disability.  States he bit his arm in an effort to hurt himself   a little bit.  States he knows he should not do that.  No other injuries.

## 2024-12-01 NOTE — ED PROVIDER NOTE - CLINICAL SUMMARY MEDICAL DECISION MAKING FREE TEXT BOX
no SI 34-year-old male with superficial self-inflicted wound to left arm. no SI.   Area cleaned and covered. can dc back to facility.

## 2024-12-01 NOTE — ED PROVIDER NOTE - PHYSICAL EXAMINATION
1 Constitutional: NAD AAOx2-3  Eyes: PERRLA EOMI  Head: Normocephalic atraumatic  Mouth: MMM  Cardiac: regular rate and rhythm  Resp: Lungs CTAB  GI: Abd s/nt/nd  Neuro: CN2-12 intact  Skin: No visible rashes. abrasion to left forearm

## 2024-12-01 NOTE — ED ADULT NURSE REASSESSMENT NOTE - NS ED NURSE REASSESS COMMENT FT1
PT did not need urinary catheter replaced at this time, catheter is draining without difficulty, 10cc's of fluid is in balloon of catheter.

## 2024-12-01 NOTE — ED ADULT NURSE NOTE - NS_ED_NURSE_TEACHING_TOPIC_ED_A_ED
PT and aid educated on how to empty mckeon catheter, educated on all d/c instructions with return verbal understanding of all d/c instructions to myself at this time.

## 2024-12-01 NOTE — ED ADULT NURSE NOTE - NSFALLRISKINTERV_ED_ALL_ED

## 2024-12-01 NOTE — ED PROVIDER NOTE - NSFOLLOWUPINSTRUCTIONS_ED_ALL_ED_FT
Please apply bacitracin twice daily to wound on left forearm and keep a clean dressing over the area until it is well-healed   please empty your Sarabia catheter as instructed.  Please follow-up with urology as previously arranged

## 2024-12-01 NOTE — ED ADULT NURSE NOTE - OBJECTIVE STATEMENT
34yM AOx4 ambulatory from a group home, c/o self inflicted L arm bite this evening, no active bleeding at this time. pt was just DCed from  earlier tonight for self injurious behavior. pt endorsing SI at this time. pt denies HI, auditory/visual/ tactile hallucinations. 1:1 in place for safety measures. PMH autism, bipolar, ADHD, suicide attempt. group home aide at the bedside. belongings in security. 34yM AOx4 ambulatory from a group home, c/o self inflicted L arm bite this evening, no active bleeding at this time. pt was DCed from  earlier tonight for self injurious behavior. pt endorsing SI at this time. pt denies HI, auditory/visual/ tactile hallucinations. 1:1 in place for safety measures. PMH autism, bipolar, ADHD, suicide attempt. group home aide at the bedside.

## 2024-12-01 NOTE — ED ADULT TRIAGE NOTE - CHIEF COMPLAINT QUOTE
pt ambulatory to triage AO x 4 c/o self harm thoughts and command auditory hallucinations telling him to hurt himself. Aide with patient reports he stated, "I want to cut myself." pt was seen at  ED today for different complaint and states he bit himself on his arm in the ambulance en route to the hospital. Wound noted to L upper arm. Bleeding controlled. Denies HI, visual/tactile hallucinations. Hx of suicide attempt 3 years ago, bipolar, autism, ADHD. Allergy to Zyprexa, haldol.

## 2024-12-02 ENCOUNTER — NON-APPOINTMENT (OUTPATIENT)
Age: 34
End: 2024-12-02

## 2024-12-03 NOTE — ED ADULT NURSE NOTE - NSPATIENTFLAG_GEN_A_ER
Diabetes Self-Management Education & Support    Presents for: Individual review    Type of Service: In Person Visit      ASSESSMENT:  -A1c went up to 8.4%, increased the jardiance to 25 mg dose  -will assess numbers two weeks post dose increase  -I did increase the lantus by 2 units until he finishes the jardiance 10 mg dose.  -he does not eat many carbs at breakfast and does not usually eat lunch if he does it is small: meat skewer at fuse, he does do his 3-4 beer during the day. Drinks light beer.  -he eats supper at around 6 pm and numbers do not rise.  -he is unable to be very active due to PAD in legs    Patient's most recent   Lab Results   Component Value Date    A1C 8.4 11/18/2024    A1C 7.1 10/03/2020     is not meeting goal of <8.0    Diabetes knowledge and skills assessment:   Patient is knowledgeable in diabetes management concepts related to: Healthy Eating, Being Active, and Monitoring    Continue education with the following diabetes management concepts: Healthy Eating, Being Active, Monitoring, Taking Medication, Problem Solving, Reducing Risks, and Healthy Coping    Based on learning assessment above, most appropriate setting for further diabetes education would be: Individual setting.      PLAN  When you finish up the Jardiance 10 mg you will start the Jardiance 25 mg dose.  ONCE you start the 25 mg dose you will drop your lantus by 4 units and watch numbers.  If you are having lows continue to drop by 2 units at a time.  Call me two weeks after starting Jardiance 25 mg dose. Ruthann [phone].      2.  Increase the lantus to 52 units until you increase the Jardiance.  IF you are going low drop it back down.        Follow-up: in two months for new a1c    See Care Plan for co-developed, patient-state behavior change goals.  AVS provided for patient today.    Education Materials Provided:  No new materials provided today      SUBJECTIVE/OBJECTIVE:  Presents for: Individual review  Accompanied by:  "Self  Diabetes type: Type 2  Cultural Influences/Ethnic Background:  Not  or       Diabetes Symptoms & Complications:          Patient Problem List and Family Medical History reviewed for relevant medical history, current medical status, and diabetes risk factors.    Vitals:  There were no vitals taken for this visit.  Estimated body mass index is 31.31 kg/m  as calculated from the following:    Height as of 11/18/24: 1.753 m (5' 9\").    Weight as of 11/18/24: 96.2 kg (212 lb).   Last 3 BP:   BP Readings from Last 3 Encounters:   11/18/24 (!) 150/72   08/19/24 130/62   07/16/24 126/73       History   Smoking Status    Former    Types: Cigarettes   Smokeless Tobacco    Never       Labs:  Lab Results   Component Value Date    A1C 8.4 11/18/2024    A1C 7.1 10/03/2020     Lab Results   Component Value Date     08/05/2024     11/03/2022     07/19/2022     02/22/2021     Lab Results   Component Value Date    LDL 59 08/05/2024    LDL 54 02/22/2021     HDL Cholesterol   Date Value Ref Range Status   02/22/2021 29 (L) >39 mg/dL Final     Direct Measure HDL   Date Value Ref Range Status   08/05/2024 35 (L) >=40 mg/dL Final   ]  GFR Estimate   Date Value Ref Range Status   08/05/2024 80 >60 mL/min/1.73m2 Final     Comment:     eGFR calculated using 2021 CKD-EPI equation.   02/22/2021 65 >60 mL/min/[1.73_m2] Final     Comment:     Non  GFR Calc  Starting 12/18/2018, serum creatinine based estimated GFR (eGFR) will be   calculated using the Chronic Kidney Disease Epidemiology Collaboration   (CKD-EPI) equation.       GFR, ESTIMATED POCT   Date Value Ref Range Status   09/13/2022 >60 >60 mL/min/1.73m2 Final     GFR Estimate If Black   Date Value Ref Range Status   02/22/2021 76 >60 mL/min/[1.73_m2] Final     Comment:      GFR Calc  Starting 12/18/2018, serum creatinine based estimated GFR (eGFR) will be   calculated using the Chronic Kidney Disease " "Epidemiology Collaboration   (CKD-EPI) equation.       Lab Results   Component Value Date    CR 0.98 08/05/2024    CR 1.11 02/22/2021     No results found for: \"MICROALBUMIN\"    Healthy Eating:  Healthy Eating Assessed Today: Yes  Breakfast: breakfast bowls  Lunch: not usually if he gets hungry: scrimp skewer or beef  Dinner: lunch special for supper-fuse.  lasagna or taco salad    Being Active:  Being Active Assessed Today: Yes (stay active, does what he can with his legs/ has PAD in legs)    Monitoring:           Taking Medications:  Diabetes Medication(s)       Insulin       insulin glargine (LANTUS PEN) 100 UNIT/ML pen Inject 50 Units subcutaneously at bedtime. Increase by 2 units every 3 days until blood sugars in am under 120 up to max 60 units daily       Sodium-Glucose Co-Transporter 2 (SGLT2) Inhibitors       empagliflozin (JARDIANCE) 10 MG TABS tablet Take 1 tablet (10 mg) by mouth daily.                 Problem Solving:                 Reducing Risks:       Healthy Coping:     Patient Activation Measure Survey Score:      2/15/2011     9:00 AM   RIDDHI Score (Last Two)   RIDDHI Raw Score 41   Activation Score 63.2   RIDDHI Level 3         Care Plan and Education Provided:  There are no care plans that you recently modified to display for this patient.          Time Spent: 30 minutes  Encounter Type: Individual    Any diabetes medication dose changes were made via the CDE Protocol per the patient's primary care provider. A copy of this encounter was shared with the provider.   " Green (Altered Mental Status/Behavior)/Purple DH (Discharge Huddle; Vulnerable Patient)

## 2024-12-05 DIAGNOSIS — Z85.47 PERSONAL HISTORY OF MALIGNANT NEOPLASM OF TESTIS: ICD-10-CM

## 2024-12-05 DIAGNOSIS — N40.1 BENIGN PROSTATIC HYPERPLASIA WITH LOWER URINARY TRACT SYMPTOMS: ICD-10-CM

## 2024-12-05 DIAGNOSIS — F63.9 IMPULSE DISORDER, UNSPECIFIED: ICD-10-CM

## 2024-12-05 DIAGNOSIS — R33.8 OTHER RETENTION OF URINE: ICD-10-CM

## 2024-12-05 DIAGNOSIS — E11.9 TYPE 2 DIABETES MELLITUS WITHOUT COMPLICATIONS: ICD-10-CM

## 2024-12-05 DIAGNOSIS — Z79.84 LONG TERM (CURRENT) USE OF ORAL HYPOGLYCEMIC DRUGS: ICD-10-CM

## 2024-12-05 DIAGNOSIS — R45.1 RESTLESSNESS AND AGITATION: ICD-10-CM

## 2024-12-05 DIAGNOSIS — Z88.9 ALLERGY STATUS TO UNSPECIFIED DRUGS, MEDICAMENTS AND BIOLOGICAL SUBSTANCES: ICD-10-CM

## 2024-12-05 DIAGNOSIS — I95.9 HYPOTENSION, UNSPECIFIED: ICD-10-CM

## 2024-12-05 DIAGNOSIS — E66.9 OBESITY, UNSPECIFIED: ICD-10-CM

## 2024-12-05 DIAGNOSIS — E03.9 HYPOTHYROIDISM, UNSPECIFIED: ICD-10-CM

## 2024-12-05 DIAGNOSIS — E78.5 HYPERLIPIDEMIA, UNSPECIFIED: ICD-10-CM

## 2024-12-05 DIAGNOSIS — K21.9 GASTRO-ESOPHAGEAL REFLUX DISEASE WITHOUT ESOPHAGITIS: ICD-10-CM

## 2024-12-05 DIAGNOSIS — J45.909 UNSPECIFIED ASTHMA, UNCOMPLICATED: ICD-10-CM

## 2024-12-05 DIAGNOSIS — F84.0 AUTISTIC DISORDER: ICD-10-CM

## 2024-12-05 DIAGNOSIS — F79 UNSPECIFIED INTELLECTUAL DISABILITIES: ICD-10-CM

## 2024-12-07 ENCOUNTER — EMERGENCY (EMERGENCY)
Facility: HOSPITAL | Age: 34
LOS: 1 days | Discharge: HOME | End: 2024-12-07
Attending: EMERGENCY MEDICINE | Admitting: EMERGENCY MEDICINE
Payer: MEDICAID

## 2024-12-07 VITALS
SYSTOLIC BLOOD PRESSURE: 104 MMHG | TEMPERATURE: 98 F | DIASTOLIC BLOOD PRESSURE: 68 MMHG | HEIGHT: 69 IN | HEART RATE: 91 BPM | RESPIRATION RATE: 20 BRPM | OXYGEN SATURATION: 100 %

## 2024-12-07 DIAGNOSIS — Z90.79 ACQUIRED ABSENCE OF OTHER GENITAL ORGAN(S): Chronic | ICD-10-CM

## 2024-12-07 LAB
APPEARANCE UR: CLEAR — SIGNIFICANT CHANGE UP
BACTERIA # UR AUTO: ABNORMAL /HPF
BILIRUB UR-MCNC: NEGATIVE — SIGNIFICANT CHANGE UP
COLOR SPEC: YELLOW — SIGNIFICANT CHANGE UP
COMMENT - URINE: SIGNIFICANT CHANGE UP
DIFF PNL FLD: NEGATIVE — SIGNIFICANT CHANGE UP
EPI CELLS # UR: PRESENT
GLUCOSE UR QL: 100 MG/DL
KETONES UR-MCNC: ABNORMAL MG/DL
LEUKOCYTE ESTERASE UR-ACNC: NEGATIVE — SIGNIFICANT CHANGE UP
NITRITE UR-MCNC: NEGATIVE — SIGNIFICANT CHANGE UP
PH UR: 5.5 — SIGNIFICANT CHANGE UP (ref 5–8)
PROT UR-MCNC: 30 MG/DL
RBC CASTS # UR COMP ASSIST: 8 /HPF — HIGH (ref 0–4)
SP GR SPEC: 1.02 — SIGNIFICANT CHANGE UP (ref 1–1.03)
UROBILINOGEN FLD QL: 0.2 MG/DL — SIGNIFICANT CHANGE UP (ref 0.2–1)
WBC UR QL: 4 /HPF — SIGNIFICANT CHANGE UP (ref 0–5)

## 2024-12-07 PROCEDURE — 87086 URINE CULTURE/COLONY COUNT: CPT

## 2024-12-07 PROCEDURE — 99283 EMERGENCY DEPT VISIT LOW MDM: CPT

## 2024-12-07 PROCEDURE — 81001 URINALYSIS AUTO W/SCOPE: CPT

## 2024-12-07 NOTE — ED PROVIDER NOTE - CLINICAL SUMMARY MEDICAL DECISION MAKING FREE TEXT BOX
Patient is a 34-year-old male with history of hypothyroid, autism, asthma, nocturnal enuresis, hyperlipidemia, diabetes.  He had a recent Sarabia catheter that was removed.  He comes in today presenting with complaint of dysuria requesting Sarabia catheter because he does not want to go back to his group following but rather he would like to go back to rehab.  He denies cough fever chills.  Denies any chest pain or shortness of breath.  During my evaluation and review of systems and assessment patient then stated his wrist began to hurt pointing to his right wrist demonstrating full range of motion with some mild clicking and popping of the synovial joints.  He stated he had prior fracture of that same wrist.    On evaluation is a well-developed well-nourished male who has a physical attributes of some with his past medical history.  Standing at the doorway of his examination room in no apparent distress.  HEENT is otherwise unremarkable.  No G/F/R.  Mucous membranes moist.  Cardiopulmonary examination is normal.  Patient has no CVA tenderness.  No bladder discomfort or bladder distention.  Abdomen is soft and nontender.  There is no pedal edema.  Examination of his right upper extremity demonstrates full range of motion without pain on palpation or percussion of the wrist or over the TFCC.  There is no ballottement of the joint.  He does reproduce a crepitance upon significant range of motion he is able to fully supinate and pronate both his wrist and his elbow and shoulder.  He has no numbness or tingling.    Plan of care includes urinalysis, bladder scan for urine retention, Velcro cock up wrist splint for comfort for the patient with a prior wrist fracture that is recurrent intermittent pain without trauma.  Referral to urology referral to orthopedics ibuprofen as needed for discomfort.  Patient declined Pyridium.  This was offered to help ease voiding.

## 2024-12-07 NOTE — ED PROVIDER NOTE - PROGRESS NOTE DETAILS
bladder scan 127 Patient told Dr. Kim he has wrist pain examined no bony tenderness placed in Velcro splint  pt offered pyridium declined will follow cultures

## 2024-12-07 NOTE — ED PROVIDER NOTE - OBJECTIVE STATEMENT
Patient is a 34-year-old male with past medical history of autism asthma enuresis GERD hyperlipidemia hypothyroidism and electrical disability troll disorder type 2 diabetes complaining of bladder pain burning with urination blood in the urine one episode today.  Patient had Sarabia catheter removed 1 week ago and rehab.  Patient denies any nausea vomiting fever chills flank pain.  Patient states he wants his Sarabia catheter back in because he wants to go back to rehab. Patient is a 34-year-old male with past medical history of autism asthma enuresis GERD hyperlipidemia hypothyroidism and electrical disability  type 2 diabetes complaining of bladder pain burning with urination blood in the urine one episode today.  Patient had Sarabia catheter removed 1 week ago and rehab.  Patient denies any nausea vomiting fever chills flank pain.  Patient states he wants his Sarabia catheter back in because he wants to go back to rehab.

## 2024-12-07 NOTE — ED ADULT NURSE NOTE - NSFALLUNIVINTERV_ED_ALL_ED
Bed/Stretcher in lowest position, wheels locked, appropriate side rails in place/Call bell, personal items and telephone in reach/Instruct patient to call for assistance before getting out of bed/chair/stretcher/Non-slip footwear applied when patient is off stretcher/[place] to call system/Physically safe environment - no spills, clutter or unnecessary equipment/Purposeful proactive rounding/Room/bathroom lighting operational, light cord in reach

## 2024-12-07 NOTE — ED PROVIDER NOTE - CARE PROVIDER_API CALL
Rubén Ospina  Joint Reconstruction  [address], [address]  [place] [address]  Phone: [phone]  Fax: [phone]  Follow Up Time:

## 2024-12-07 NOTE — ED PROVIDER NOTE - PATIENT PORTAL LINK FT
You can access the FollowMyHealth Patient Portal offered by [facility] by registering at the following website: [email or web address]. By joining [employer]’s FollowMyHealth portal, you will also be able to view your health information using other applications (apps) compatible with our system.

## 2024-12-07 NOTE — ED ADULT NURSE NOTE - OBJECTIVE STATEMENT
the patient presents to the er from group home.  he states he was seen here recently and had to have a mckeon catheter.  he was then sent to rehab because he was not permitted back at group home with mckeon.  on thursday, the patient wanted the mckeon removed, and so nurse removed it.  he was d/c and sent back to group home.  he comes in today stating he does not want to go to the bathroom because it burns.  he reported that he has not gone to the bathroom since last night, but aid corrected him and told him he went this am.  bladder scan performed, 127ml.  he is insisting upon a mckeon.  I explained to him that the bladder scan does not warrant it but we will wait and see what the md / pa said / recommend

## 2024-12-07 NOTE — ED PROVIDER NOTE - NSFOLLOWUPINSTRUCTIONS_ED_ALL_ED_FT
may participate in sports and gym as tolerated pt is only restricted by his level of discomfort for his arthritic wrist. he may wear the velcro removable wrist splint as needed for comfort.  It is not mandatory that he use the splint continuously. He is to remove it for bathing dressing as well.  he should follow up with his primary care doctor, urologist and an orthopedist (dr Ospina)

## 2024-12-07 NOTE — ED ADULT TRIAGE NOTE - CHIEF COMPLAINT QUOTE
pt was brought into ED by group home ([facility]) aid for C/O bladder pain, burning with urination and blood in the urine. pt recently had mckeon catheter removed 1 week ago in rehab.

## 2024-12-08 LAB
CULTURE RESULTS: NO GROWTH — SIGNIFICANT CHANGE UP
SPECIMEN SOURCE: SIGNIFICANT CHANGE UP

## 2024-12-12 ENCOUNTER — EMERGENCY (EMERGENCY)
Facility: HOSPITAL | Age: 34
LOS: 1 days | Discharge: HOME | End: 2024-12-12
Attending: INTERNAL MEDICINE | Admitting: INTERNAL MEDICINE
Payer: MEDICAID

## 2024-12-12 VITALS
OXYGEN SATURATION: 99 % | HEART RATE: 76 BPM | SYSTOLIC BLOOD PRESSURE: 125 MMHG | DIASTOLIC BLOOD PRESSURE: 82 MMHG | TEMPERATURE: 98 F | RESPIRATION RATE: 18 BRPM

## 2024-12-12 VITALS
OXYGEN SATURATION: 98 % | DIASTOLIC BLOOD PRESSURE: 83 MMHG | HEART RATE: 69 BPM | HEIGHT: 69 IN | TEMPERATURE: 98 F | RESPIRATION RATE: 18 BRPM | SYSTOLIC BLOOD PRESSURE: 128 MMHG

## 2024-12-12 DIAGNOSIS — Z90.79 ACQUIRED ABSENCE OF OTHER GENITAL ORGAN(S): Chronic | ICD-10-CM

## 2024-12-12 LAB
ALBUMIN SERPL ELPH-MCNC: 3.4 G/DL — SIGNIFICANT CHANGE UP (ref 3.3–5)
ALP SERPL-CCNC: 91 U/L — SIGNIFICANT CHANGE UP (ref 40–120)
ALT FLD-CCNC: 47 U/L — SIGNIFICANT CHANGE UP (ref 12–78)
ANION GAP SERPL CALC-SCNC: 7 MMOL/L — SIGNIFICANT CHANGE UP (ref 5–17)
AST SERPL-CCNC: 22 U/L — SIGNIFICANT CHANGE UP (ref 15–37)
BASOPHILS # BLD AUTO: 0.04 K/UL — SIGNIFICANT CHANGE UP (ref 0–0.2)
BASOPHILS NFR BLD AUTO: 0.5 % — SIGNIFICANT CHANGE UP (ref 0–2)
BILIRUB SERPL-MCNC: <0.1 MG/DL — LOW (ref 0.2–1.2)
BUN SERPL-MCNC: 14 MG/DL — SIGNIFICANT CHANGE UP (ref 7–23)
CALCIUM SERPL-MCNC: 9.4 MG/DL — SIGNIFICANT CHANGE UP (ref 8.5–10.1)
CHLORIDE SERPL-SCNC: 104 MMOL/L — SIGNIFICANT CHANGE UP (ref 96–108)
CO2 SERPL-SCNC: 27 MMOL/L — SIGNIFICANT CHANGE UP (ref 22–31)
CREAT SERPL-MCNC: 0.92 MG/DL — SIGNIFICANT CHANGE UP (ref 0.5–1.3)
D DIMER BLD IA.RAPID-MCNC: <150 NG/ML DDU — SIGNIFICANT CHANGE UP
EGFR: 111 ML/MIN/1.73M2 — SIGNIFICANT CHANGE UP
EOSINOPHIL # BLD AUTO: 0.38 K/UL — SIGNIFICANT CHANGE UP (ref 0–0.5)
EOSINOPHIL NFR BLD AUTO: 4.4 % — SIGNIFICANT CHANGE UP (ref 0–6)
GLUCOSE SERPL-MCNC: 181 MG/DL — HIGH (ref 70–99)
HCT VFR BLD CALC: 39.7 % — SIGNIFICANT CHANGE UP (ref 39–50)
HGB BLD-MCNC: 12.7 G/DL — LOW (ref 13–17)
IMM GRANULOCYTES NFR BLD AUTO: 1 % — HIGH (ref 0–0.9)
LYMPHOCYTES # BLD AUTO: 3.31 K/UL — HIGH (ref 1–3.3)
LYMPHOCYTES # BLD AUTO: 38.1 % — SIGNIFICANT CHANGE UP (ref 13–44)
MCHC RBC-ENTMCNC: 24.5 PG — LOW (ref 27–34)
MCHC RBC-ENTMCNC: 32 G/DL — SIGNIFICANT CHANGE UP (ref 32–36)
MCV RBC AUTO: 76.6 FL — LOW (ref 80–100)
MONOCYTES # BLD AUTO: 0.68 K/UL — SIGNIFICANT CHANGE UP (ref 0–0.9)
MONOCYTES NFR BLD AUTO: 7.8 % — SIGNIFICANT CHANGE UP (ref 2–14)
NEUTROPHILS # BLD AUTO: 4.18 K/UL — SIGNIFICANT CHANGE UP (ref 1.8–7.4)
NEUTROPHILS NFR BLD AUTO: 48.2 % — SIGNIFICANT CHANGE UP (ref 43–77)
NRBC # BLD: 0 /100 WBCS — SIGNIFICANT CHANGE UP (ref 0–0)
PLATELET # BLD AUTO: 278 K/UL — SIGNIFICANT CHANGE UP (ref 150–400)
POTASSIUM SERPL-MCNC: 4.2 MMOL/L — SIGNIFICANT CHANGE UP (ref 3.5–5.3)
POTASSIUM SERPL-SCNC: 4.2 MMOL/L — SIGNIFICANT CHANGE UP (ref 3.5–5.3)
PROT SERPL-MCNC: 6.9 G/DL — SIGNIFICANT CHANGE UP (ref 6–8.3)
RBC # BLD: 5.18 M/UL — SIGNIFICANT CHANGE UP (ref 4.2–5.8)
RBC # FLD: 15.7 % — HIGH (ref 10.3–14.5)
SODIUM SERPL-SCNC: 138 MMOL/L — SIGNIFICANT CHANGE UP (ref 135–145)
TROPONIN I, HIGH SENSITIVITY RESULT: 6.3 NG/L — SIGNIFICANT CHANGE UP
WBC # BLD: 8.68 K/UL — SIGNIFICANT CHANGE UP (ref 3.8–10.5)
WBC # FLD AUTO: 8.68 K/UL — SIGNIFICANT CHANGE UP (ref 3.8–10.5)

## 2024-12-12 PROCEDURE — 85379 FIBRIN DEGRADATION QUANT: CPT

## 2024-12-12 PROCEDURE — 99285 EMERGENCY DEPT VISIT HI MDM: CPT

## 2024-12-12 PROCEDURE — 80053 COMPREHEN METABOLIC PANEL: CPT

## 2024-12-12 PROCEDURE — 36415 COLL VENOUS BLD VENIPUNCTURE: CPT

## 2024-12-12 PROCEDURE — 84484 ASSAY OF TROPONIN QUANT: CPT

## 2024-12-12 PROCEDURE — 93005 ELECTROCARDIOGRAM TRACING: CPT

## 2024-12-12 PROCEDURE — 71045 X-RAY EXAM CHEST 1 VIEW: CPT

## 2024-12-12 PROCEDURE — 85025 COMPLETE CBC W/AUTO DIFF WBC: CPT

## 2024-12-12 PROCEDURE — 71045 X-RAY EXAM CHEST 1 VIEW: CPT | Mod: 26

## 2024-12-12 PROCEDURE — 93010 ELECTROCARDIOGRAM REPORT: CPT

## 2024-12-12 RX ORDER — CYCLOBENZAPRINE HCL 10 MG
10 TABLET ORAL ONCE
Refills: 0 | Status: COMPLETED | OUTPATIENT
Start: 2024-12-12 | End: 2024-12-12

## 2024-12-12 RX ADMIN — Medication 10 MILLIGRAM(S): at 21:14

## 2024-12-12 NOTE — ED ADULT NURSE NOTE - OBJECTIVE STATEMENT
33yo Male co Cp x1 day and SOB  for cardiac workup. Pt denies any other complaints at this time. Pt appearing comfortable and sleeping soon after blood work was completed. EKG completed upon arrival. Group home aid at bedside.

## 2024-12-12 NOTE — ED PROVIDER NOTE - OBJECTIVE STATEMENT
Patient came in c/o chest pain all day today sent here from urgent care for evaluation.  chest pain 33 y/o male  c/o chest pain all day today sent here from urgent care for evaluation. 33 y/o male  c/o chest pain all day today sent here from urgent care for evaluation. no headache, no exertional  chest pain, no SOB, no palpitations, no diaphoresis, no radiation  no n/v,  no neuro changes.

## 2024-12-12 NOTE — ED PROVIDER NOTE - COVID-19 RESULT DATE/TIME
Chief Complaint   Patient presents with   • Flu Like Symptoms     X 1 day. Body aches.    • Painful Urination     X 2 days. Denies blood/discharge. Hx UTIs.      Denies abd/pelvic. Denies N/V/D/.    /66   Pulse 87   Temp 36.8 °C (98.3 °F) (Temporal)   Resp 16   LMP  (LMP Unknown) Comment: unable to remmeber  SpO2 99%    20-Nov-2024 23:07

## 2024-12-12 NOTE — ED PROVIDER NOTE - CLINICAL SUMMARY MEDICAL DECISION MAKING FREE TEXT BOX
33 y/o male  c/o chest pain all day today sent here from urgent care for evaluation. no headache, no exertional  chest pain, no SOB, no palpitations, no diaphoresis, no radiation  no n/v,  no neuro changes. VSS Exam stable labs cxr ekg and enzyme are normal, treated with flexeril, stable for DC to OP care

## 2024-12-12 NOTE — ED ADULT NURSE NOTE - AVIAN FLU SYMPTOMS
No
Increased nutrient needs due to hx CVA/Dysphagia & hx pneumonia & presents with PNA & sepsis with SOB

## 2024-12-12 NOTE — ED PROVIDER NOTE - CARE PROVIDER_API CALL
Christiano Duffy  Cardiology  [address] [place] [address]  Phone: [phone]  Fax: [phone]  Follow Up Time: 1-3 Days

## 2024-12-12 NOTE — ED PROVIDER NOTE - PATIENT PORTAL LINK FT
You can access the FollowMyHealth Patient Portal offered by [facility] by registering at the following website: [email or web address]. By joining [facility]’s FollowMyHealth portal, you will also be able to view your health information using other applications (apps) compatible with our system.

## 2024-12-12 NOTE — ED PROVIDER NOTE - SIGNIFICANT NEGATIVE FINDINGS
no headache, no exertional  chest pain, no SOB, no palpitations, no diaphoresis, no radiation  no n/v,  no neuro changes.

## 2024-12-12 NOTE — ED ADULT NURSE NOTE - NSFALLUNIVINTERV_ED_ALL_ED
Bed/Stretcher in lowest position, wheels locked, appropriate side rails in place/Call bell, personal items and telephone in reach/Instruct patient to call for assistance before getting out of bed/chair/stretcher/Non-slip footwear applied when patient is off stretcher/[place] to call system/Physically safe environment - no spills, clutter or unnecessary equipment/Purposeful proactive rounding/Room/bathroom lighting operational, light cord in reach

## 2024-12-15 ENCOUNTER — EMERGENCY (EMERGENCY)
Facility: HOSPITAL | Age: 34
LOS: 0 days | Discharge: HOME | End: 2024-12-16
Attending: STUDENT IN AN ORGANIZED HEALTH CARE EDUCATION/TRAINING PROGRAM
Payer: MEDICAID

## 2024-12-15 VITALS
SYSTOLIC BLOOD PRESSURE: 122 MMHG | HEART RATE: 92 BPM | HEIGHT: 69 IN | DIASTOLIC BLOOD PRESSURE: 73 MMHG | OXYGEN SATURATION: 100 % | TEMPERATURE: 98 F | RESPIRATION RATE: 16 BRPM

## 2024-12-15 DIAGNOSIS — K21.9 GASTRO-ESOPHAGEAL REFLUX DISEASE WITHOUT ESOPHAGITIS: ICD-10-CM

## 2024-12-15 DIAGNOSIS — N40.1 BENIGN PROSTATIC HYPERPLASIA WITH LOWER URINARY TRACT SYMPTOMS: ICD-10-CM

## 2024-12-15 DIAGNOSIS — F39 UNSPECIFIED MOOD [AFFECTIVE] DISORDER: ICD-10-CM

## 2024-12-15 DIAGNOSIS — F84.0 AUTISTIC DISORDER: ICD-10-CM

## 2024-12-15 DIAGNOSIS — F79 UNSPECIFIED INTELLECTUAL DISABILITIES: ICD-10-CM

## 2024-12-15 DIAGNOSIS — F41.1 GENERALIZED ANXIETY DISORDER: ICD-10-CM

## 2024-12-15 DIAGNOSIS — S50.812A ABRASION OF LEFT FOREARM, INITIAL ENCOUNTER: ICD-10-CM

## 2024-12-15 DIAGNOSIS — Z90.79 ACQUIRED ABSENCE OF OTHER GENITAL ORGAN(S): Chronic | ICD-10-CM

## 2024-12-15 DIAGNOSIS — R45.851 SUICIDAL IDEATIONS: ICD-10-CM

## 2024-12-15 DIAGNOSIS — H52.10 MYOPIA, UNSPECIFIED EYE: ICD-10-CM

## 2024-12-15 DIAGNOSIS — R33.8 OTHER RETENTION OF URINE: ICD-10-CM

## 2024-12-15 DIAGNOSIS — Z88.8 ALLERGY STATUS TO OTHER DRUGS, MEDICAMENTS AND BIOLOGICAL SUBSTANCES: ICD-10-CM

## 2024-12-15 DIAGNOSIS — I10 ESSENTIAL (PRIMARY) HYPERTENSION: ICD-10-CM

## 2024-12-15 DIAGNOSIS — F68.10 FACTITIOUS DISORDER IMPOSED ON SELF, UNSPECIFIED: ICD-10-CM

## 2024-12-15 DIAGNOSIS — E11.9 TYPE 2 DIABETES MELLITUS WITHOUT COMPLICATIONS: ICD-10-CM

## 2024-12-15 DIAGNOSIS — Z88.1 ALLERGY STATUS TO OTHER ANTIBIOTIC AGENTS: ICD-10-CM

## 2024-12-15 DIAGNOSIS — E78.5 HYPERLIPIDEMIA, UNSPECIFIED: ICD-10-CM

## 2024-12-15 DIAGNOSIS — F43.10 POST-TRAUMATIC STRESS DISORDER, UNSPECIFIED: ICD-10-CM

## 2024-12-15 DIAGNOSIS — S30.811A ABRASION OF ABDOMINAL WALL, INITIAL ENCOUNTER: ICD-10-CM

## 2024-12-15 DIAGNOSIS — F63.9 IMPULSE DISORDER, UNSPECIFIED: ICD-10-CM

## 2024-12-15 DIAGNOSIS — E03.9 HYPOTHYROIDISM, UNSPECIFIED: ICD-10-CM

## 2024-12-15 DIAGNOSIS — J45.909 UNSPECIFIED ASTHMA, UNCOMPLICATED: ICD-10-CM

## 2024-12-15 PROCEDURE — 99285 EMERGENCY DEPT VISIT HI MDM: CPT

## 2024-12-15 PROCEDURE — 80307 DRUG TEST PRSMV CHEM ANLYZR: CPT

## 2024-12-15 PROCEDURE — 36415 COLL VENOUS BLD VENIPUNCTURE: CPT

## 2024-12-15 PROCEDURE — 85025 COMPLETE CBC W/AUTO DIFF WBC: CPT

## 2024-12-15 PROCEDURE — 81003 URINALYSIS AUTO W/O SCOPE: CPT

## 2024-12-15 PROCEDURE — 93005 ELECTROCARDIOGRAM TRACING: CPT

## 2024-12-15 PROCEDURE — 0241U: CPT

## 2024-12-15 PROCEDURE — 80048 BASIC METABOLIC PNL TOTAL CA: CPT

## 2024-12-15 PROCEDURE — 99284 EMERGENCY DEPT VISIT MOD MDM: CPT | Mod: 25

## 2024-12-15 NOTE — ED ADULT TRIAGE NOTE - CHIEF COMPLAINT QUOTE
Pt [facility]IB[facility]S and [facility] from group home c/o SOB. Pt has hx asthma, per ems pt expressed SI/HI with audible hallucinations telling him to "attack somebody". Pt O2 100% RA, sent for EKG. 1:1 initiated in triage. No other medical complaints at this time.

## 2024-12-16 ENCOUNTER — EMERGENCY (EMERGENCY)
Facility: HOSPITAL | Age: 34
LOS: 0 days | Discharge: HOME | End: 2024-12-16
Attending: EMERGENCY MEDICINE
Payer: MEDICAID

## 2024-12-16 VITALS
HEIGHT: 69 IN | WEIGHT: 299.17 LBS | RESPIRATION RATE: 16 BRPM | HEART RATE: 105 BPM | OXYGEN SATURATION: 98 % | TEMPERATURE: 98 F

## 2024-12-16 VITALS
OXYGEN SATURATION: 100 % | DIASTOLIC BLOOD PRESSURE: 76 MMHG | TEMPERATURE: 98 F | HEART RATE: 98 BPM | SYSTOLIC BLOOD PRESSURE: 124 MMHG | RESPIRATION RATE: 18 BRPM

## 2024-12-16 VITALS
RESPIRATION RATE: 18 BRPM | SYSTOLIC BLOOD PRESSURE: 120 MMHG | OXYGEN SATURATION: 98 % | HEART RATE: 82 BPM | TEMPERATURE: 98 F | DIASTOLIC BLOOD PRESSURE: 82 MMHG
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
## 2024-12-16 DIAGNOSIS — F68.10 FACTITIOUS DISORDER IMPOSED ON SELF, UNSPECIFIED: ICD-10-CM
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
## 2024-12-16 DIAGNOSIS — F63.9 IMPULSE DISORDER, UNSPECIFIED: ICD-10-CM

## 2024-12-16 DIAGNOSIS — F84.0 AUTISTIC DISORDER: ICD-10-CM

## 2024-12-16 DIAGNOSIS — E03.9 HYPOTHYROIDISM, UNSPECIFIED: ICD-10-CM

## 2024-12-16 DIAGNOSIS — F79 UNSPECIFIED INTELLECTUAL DISABILITIES: ICD-10-CM

## 2024-12-16 DIAGNOSIS — E11.9 TYPE 2 DIABETES MELLITUS WITHOUT COMPLICATIONS: ICD-10-CM

## 2024-12-16 DIAGNOSIS — S60.511A ABRASION OF RIGHT HAND, INITIAL ENCOUNTER: ICD-10-CM

## 2024-12-16 DIAGNOSIS — E78.5 HYPERLIPIDEMIA, UNSPECIFIED: ICD-10-CM

## 2024-12-16 DIAGNOSIS — Z88.8 ALLERGY STATUS TO OTHER DRUGS, MEDICAMENTS AND BIOLOGICAL SUBSTANCES: ICD-10-CM

## 2024-12-16 DIAGNOSIS — Z90.79 ACQUIRED ABSENCE OF OTHER GENITAL ORGAN(S): Chronic | ICD-10-CM

## 2024-12-16 LAB
ALBUMIN SERPL ELPH-MCNC: 3.5 G/DL — SIGNIFICANT CHANGE UP (ref 3.3–5)
ALP SERPL-CCNC: 78 U/L — SIGNIFICANT CHANGE UP (ref 40–120)
ALT FLD-CCNC: 36 U/L — SIGNIFICANT CHANGE UP (ref 12–78)
AMPHET UR-MCNC: NEGATIVE — SIGNIFICANT CHANGE UP
AMPHET UR-MCNC: NEGATIVE — SIGNIFICANT CHANGE UP
ANION GAP SERPL CALC-SCNC: 6 MMOL/L — SIGNIFICANT CHANGE UP (ref 5–17)
ANION GAP SERPL CALC-SCNC: 8 MMOL/L — SIGNIFICANT CHANGE UP (ref 5–17)
APAP SERPL-MCNC: <2 UG/ML — LOW (ref 10–30)
APAP SERPL-MCNC: <2 UG/ML — LOW (ref 10–30)
APPEARANCE UR: ABNORMAL
APPEARANCE UR: CLEAR — SIGNIFICANT CHANGE UP
AST SERPL-CCNC: 16 U/L — SIGNIFICANT CHANGE UP (ref 15–37)
BACTERIA # UR AUTO: NEGATIVE /HPF — SIGNIFICANT CHANGE UP
BARBITURATES UR SCN-MCNC: NEGATIVE — SIGNIFICANT CHANGE UP
BARBITURATES UR SCN-MCNC: NEGATIVE — SIGNIFICANT CHANGE UP
BASOPHILS # BLD AUTO: 0.03 K/UL — SIGNIFICANT CHANGE UP (ref 0–0.2)
BASOPHILS # BLD AUTO: 0.03 K/UL — SIGNIFICANT CHANGE UP (ref 0–0.2)
BASOPHILS NFR BLD AUTO: 0.3 % — SIGNIFICANT CHANGE UP (ref 0–2)
BASOPHILS NFR BLD AUTO: 0.4 % — SIGNIFICANT CHANGE UP (ref 0–2)
BENZODIAZ UR-MCNC: NEGATIVE — SIGNIFICANT CHANGE UP
BENZODIAZ UR-MCNC: NEGATIVE — SIGNIFICANT CHANGE UP
BILIRUB SERPL-MCNC: 0.3 MG/DL — SIGNIFICANT CHANGE UP (ref 0.2–1.2)
BILIRUB UR-MCNC: NEGATIVE — SIGNIFICANT CHANGE UP
BILIRUB UR-MCNC: NEGATIVE — SIGNIFICANT CHANGE UP
BUN SERPL-MCNC: 13 MG/DL — SIGNIFICANT CHANGE UP (ref 7–23)
BUN SERPL-MCNC: 14 MG/DL — SIGNIFICANT CHANGE UP (ref 7–23)
CALCIUM SERPL-MCNC: 9.3 MG/DL — SIGNIFICANT CHANGE UP (ref 8.5–10.1)
CALCIUM SERPL-MCNC: 9.5 MG/DL — SIGNIFICANT CHANGE UP (ref 8.5–10.1)
CAST: 17 /LPF — HIGH (ref 0–4)
CHLORIDE SERPL-SCNC: 100 MMOL/L — SIGNIFICANT CHANGE UP (ref 96–108)
CHLORIDE SERPL-SCNC: 104 MMOL/L — SIGNIFICANT CHANGE UP (ref 96–108)
CO2 SERPL-SCNC: 26 MMOL/L — SIGNIFICANT CHANGE UP (ref 22–31)
CO2 SERPL-SCNC: 28 MMOL/L — SIGNIFICANT CHANGE UP (ref 22–31)
COCAINE METAB.OTHER UR-MCNC: NEGATIVE — SIGNIFICANT CHANGE UP
COCAINE METAB.OTHER UR-MCNC: NEGATIVE — SIGNIFICANT CHANGE UP
COLOR SPEC: YELLOW — SIGNIFICANT CHANGE UP
COLOR SPEC: YELLOW — SIGNIFICANT CHANGE UP
CREAT SERPL-MCNC: 1.01 MG/DL — SIGNIFICANT CHANGE UP (ref 0.5–1.3)
CREAT SERPL-MCNC: 1.04 MG/DL — SIGNIFICANT CHANGE UP (ref 0.5–1.3)
DIFF PNL FLD: NEGATIVE — SIGNIFICANT CHANGE UP
DIFF PNL FLD: NEGATIVE — SIGNIFICANT CHANGE UP
EGFR: 96 ML/MIN/1.73M2 — SIGNIFICANT CHANGE UP
EGFR: 99 ML/MIN/1.73M2 — SIGNIFICANT CHANGE UP
EOSINOPHIL # BLD AUTO: 0.15 K/UL — SIGNIFICANT CHANGE UP (ref 0–0.5)
EOSINOPHIL # BLD AUTO: 0.22 K/UL — SIGNIFICANT CHANGE UP (ref 0–0.5)
EOSINOPHIL NFR BLD AUTO: 1.7 % — SIGNIFICANT CHANGE UP (ref 0–6)
EOSINOPHIL NFR BLD AUTO: 3 % — SIGNIFICANT CHANGE UP (ref 0–6)
EPI CELLS # UR: PRESENT
ETHANOL SERPL-MCNC: <10 MG/DL — SIGNIFICANT CHANGE UP (ref 0–10)
ETHANOL SERPL-MCNC: <10 MG/DL — SIGNIFICANT CHANGE UP (ref 0–10)
FENTANYL UR QL SCN: NEGATIVE — SIGNIFICANT CHANGE UP
FENTANYL UR QL SCN: NEGATIVE — SIGNIFICANT CHANGE UP
FLUAV AG NPH QL: SIGNIFICANT CHANGE UP
FLUAV AG NPH QL: SIGNIFICANT CHANGE UP
FLUBV AG NPH QL: SIGNIFICANT CHANGE UP
FLUBV AG NPH QL: SIGNIFICANT CHANGE UP
GLUCOSE SERPL-MCNC: 144 MG/DL — HIGH (ref 70–99)
GLUCOSE SERPL-MCNC: 150 MG/DL — HIGH (ref 70–99)
GLUCOSE UR QL: NEGATIVE MG/DL — SIGNIFICANT CHANGE UP
GLUCOSE UR QL: NEGATIVE MG/DL — SIGNIFICANT CHANGE UP
GRAN CASTS # UR COMP ASSIST: SIGNIFICANT CHANGE UP
HCT VFR BLD CALC: 40.4 % — SIGNIFICANT CHANGE UP (ref 39–50)
HCT VFR BLD CALC: 41.5 % — SIGNIFICANT CHANGE UP (ref 39–50)
HGB BLD-MCNC: 12.6 G/DL — LOW (ref 13–17)
HGB BLD-MCNC: 12.9 G/DL — LOW (ref 13–17)
HYALINE CASTS # UR AUTO: PRESENT
IMM GRANULOCYTES NFR BLD AUTO: 0.8 % — SIGNIFICANT CHANGE UP (ref 0–0.9)
IMM GRANULOCYTES NFR BLD AUTO: 0.8 % — SIGNIFICANT CHANGE UP (ref 0–0.9)
KETONES UR-MCNC: ABNORMAL MG/DL
KETONES UR-MCNC: ABNORMAL MG/DL
LEUKOCYTE ESTERASE UR-ACNC: ABNORMAL
LEUKOCYTE ESTERASE UR-ACNC: NEGATIVE — SIGNIFICANT CHANGE UP
LYMPHOCYTES # BLD AUTO: 2.59 K/UL — SIGNIFICANT CHANGE UP (ref 1–3.3)
LYMPHOCYTES # BLD AUTO: 2.77 K/UL — SIGNIFICANT CHANGE UP (ref 1–3.3)
LYMPHOCYTES # BLD AUTO: 28.5 % — SIGNIFICANT CHANGE UP (ref 13–44)
LYMPHOCYTES # BLD AUTO: 37.5 % — SIGNIFICANT CHANGE UP (ref 13–44)
MCHC RBC-ENTMCNC: 24.3 PG — LOW (ref 27–34)
MCHC RBC-ENTMCNC: 24.4 PG — LOW (ref 27–34)
MCHC RBC-ENTMCNC: 31.1 G/DL — LOW (ref 32–36)
MCHC RBC-ENTMCNC: 31.2 G/DL — LOW (ref 32–36)
MCV RBC AUTO: 77.8 FL — LOW (ref 80–100)
MCV RBC AUTO: 78.4 FL — LOW (ref 80–100)
METHADONE UR-MCNC: NEGATIVE — SIGNIFICANT CHANGE UP
METHADONE UR-MCNC: NEGATIVE — SIGNIFICANT CHANGE UP
MONOCYTES # BLD AUTO: 0.55 K/UL — SIGNIFICANT CHANGE UP (ref 0–0.9)
MONOCYTES # BLD AUTO: 0.68 K/UL — SIGNIFICANT CHANGE UP (ref 0–0.9)
MONOCYTES NFR BLD AUTO: 7.4 % — SIGNIFICANT CHANGE UP (ref 2–14)
MONOCYTES NFR BLD AUTO: 7.5 % — SIGNIFICANT CHANGE UP (ref 2–14)
NEUTROPHILS # BLD AUTO: 3.76 K/UL — SIGNIFICANT CHANGE UP (ref 1.8–7.4)
NEUTROPHILS # BLD AUTO: 5.56 K/UL — SIGNIFICANT CHANGE UP (ref 1.8–7.4)
NEUTROPHILS NFR BLD AUTO: 50.9 % — SIGNIFICANT CHANGE UP (ref 43–77)
NEUTROPHILS NFR BLD AUTO: 61.2 % — SIGNIFICANT CHANGE UP (ref 43–77)
NITRITE UR-MCNC: NEGATIVE — SIGNIFICANT CHANGE UP
NITRITE UR-MCNC: NEGATIVE — SIGNIFICANT CHANGE UP
OPIATES UR-MCNC: NEGATIVE — SIGNIFICANT CHANGE UP
OPIATES UR-MCNC: NEGATIVE — SIGNIFICANT CHANGE UP
PCP SPEC-MCNC: SIGNIFICANT CHANGE UP
PCP SPEC-MCNC: SIGNIFICANT CHANGE UP
PCP UR-MCNC: NEGATIVE — SIGNIFICANT CHANGE UP
PCP UR-MCNC: NEGATIVE — SIGNIFICANT CHANGE UP
PH UR: 5 — SIGNIFICANT CHANGE UP (ref 5–8)
PH UR: 5.5 — SIGNIFICANT CHANGE UP (ref 5–8)
PLATELET # BLD AUTO: 260 K/UL — SIGNIFICANT CHANGE UP (ref 150–400)
PLATELET # BLD AUTO: 264 K/UL — SIGNIFICANT CHANGE UP (ref 150–400)
POTASSIUM SERPL-MCNC: 4.2 MMOL/L — SIGNIFICANT CHANGE UP (ref 3.5–5.3)
POTASSIUM SERPL-MCNC: 4.2 MMOL/L — SIGNIFICANT CHANGE UP (ref 3.5–5.3)
POTASSIUM SERPL-SCNC: 4.2 MMOL/L — SIGNIFICANT CHANGE UP (ref 3.5–5.3)
POTASSIUM SERPL-SCNC: 4.2 MMOL/L — SIGNIFICANT CHANGE UP (ref 3.5–5.3)
PROT SERPL-MCNC: 7.2 GM/DL — SIGNIFICANT CHANGE UP (ref 6–8.3)
PROT UR-MCNC: 30 MG/DL
PROT UR-MCNC: NEGATIVE MG/DL — SIGNIFICANT CHANGE UP
RBC # BLD: 5.19 M/UL — SIGNIFICANT CHANGE UP (ref 4.2–5.8)
RBC # BLD: 5.29 M/UL — SIGNIFICANT CHANGE UP (ref 4.2–5.8)
RBC # FLD: 15.9 % — HIGH (ref 10.3–14.5)
RBC # FLD: 15.9 % — HIGH (ref 10.3–14.5)
RBC CASTS # UR COMP ASSIST: 3 /HPF — SIGNIFICANT CHANGE UP (ref 0–4)
RSV RNA NPH QL NAA+NON-PROBE: SIGNIFICANT CHANGE UP
RSV RNA NPH QL NAA+NON-PROBE: SIGNIFICANT CHANGE UP
SALICYLATES SERPL-MCNC: <1.7 MG/DL — LOW (ref 2.8–20)
SALICYLATES SERPL-MCNC: <1.7 MG/DL — LOW (ref 2.8–20)
SARS-COV-2 RNA SPEC QL NAA+PROBE: SIGNIFICANT CHANGE UP
SARS-COV-2 RNA SPEC QL NAA+PROBE: SIGNIFICANT CHANGE UP
SODIUM SERPL-SCNC: 136 MMOL/L — SIGNIFICANT CHANGE UP (ref 135–145)
SODIUM SERPL-SCNC: 136 MMOL/L — SIGNIFICANT CHANGE UP (ref 135–145)
SP GR SPEC: 1.02 — SIGNIFICANT CHANGE UP (ref 1–1.03)
SP GR SPEC: 1.03 — SIGNIFICANT CHANGE UP (ref 1–1.03)
SQUAMOUS # UR AUTO: 27 /HPF — HIGH (ref 0–5)
THC UR QL: NEGATIVE — SIGNIFICANT CHANGE UP
THC UR QL: NEGATIVE — SIGNIFICANT CHANGE UP
TSH SERPL-MCNC: 5.54 UU/ML — HIGH (ref 0.34–4.82)
UROBILINOGEN FLD QL: 0.2 MG/DL — SIGNIFICANT CHANGE UP (ref 0.2–1)
UROBILINOGEN FLD QL: 1 MG/DL — SIGNIFICANT CHANGE UP (ref 0.2–1)
WBC # BLD: 7.39 K/UL — SIGNIFICANT CHANGE UP (ref 3.8–10.5)
WBC # BLD: 9.08 K/UL — SIGNIFICANT CHANGE UP (ref 3.8–10.5)
WBC # FLD AUTO: 7.39 K/UL — SIGNIFICANT CHANGE UP (ref 3.8–10.5)
WBC # FLD AUTO: 9.08 K/UL — SIGNIFICANT CHANGE UP (ref 3.8–10.5)
WBC UR QL: 9 /HPF — HIGH (ref 0–5)

## 2024-12-16 PROCEDURE — 99284 EMERGENCY DEPT VISIT MOD MDM: CPT | Mod: 95

## 2024-12-16 PROCEDURE — 99285 EMERGENCY DEPT VISIT HI MDM: CPT

## 2024-12-16 PROCEDURE — 93010 ELECTROCARDIOGRAM REPORT: CPT

## 2024-12-16 PROCEDURE — 93010 ELECTROCARDIOGRAM REPORT: CPT | Mod: 76

## 2024-12-16 NOTE — ED BEHAVIORAL HEALTH ASSESSMENT NOTE - SAFETY PLAN ADDT'L DETAILS
Education provided regarding environmental safety / lethal means restriction/Provision of National Suicide Prevention Lifeline 4-087-015-TALK (5239)

## 2024-12-16 NOTE — ED BEHAVIORAL HEALTH ASSESSMENT NOTE - SUMMARY
34 year old male, single, domiciled at [facility] (with 1:1), employed as assistant reader at [employer], with PMH asthma, DM, BPH, urinary retention, hx testicular cancer s/p orchidectomy, GERD, hypothyroidism, HTN, HLD, myopia, PPH autism spectrum disorder, moderate intellectual disability; other diagnoses includes bipolar disorder, impulse control disorder, HAVEN, mood disorder NOS, PTSD, ADHD and factitious disorder. with multiple past inpatient psychiatric admissions (as per [employer] staff, most recently 4/2024, hx of cutting himself, charted longstanding hx of endorsing SI (situational: mostly stemming from discontent of living environment) as well as hx of aggression toward group home staff; BIBEMS a/b patient reporting suicidal ideation, recent self harm with superficial abrasions to stomach and forearm.     Current presentation similar to multiple prior, with patient requesting inpatient psychiatric admission, struglging with poor impulse control, frequent ED presentations (presented to another ED earlier today) for various medical and psychiatric complaints. Psychiatric residence denies any safety concerns, ongoing medication adherence, patient is additionally on 24 hr 1:1 observation. Despite reports of ongoing suicidal thoughts, pt is notably help-seeking, describes suicidal thoughts as ego-dystonic, motivated to engage in treatment. Discussed patient's concerns regarding medications, facility corroborates plan to arrange earlier appointment with outpatient psychiatrist. No psychiatric indication for hospitalization at this time.

## 2024-12-16 NOTE — ED ADULT TRIAGE NOTE - CHIEF COMPLAINT QUOTE
patient brought in by EMS from [facility] w/ TY badge # [id number] for self injurious behavior.  cut right hand with knife.  1:1 initiated in triage.

## 2024-12-16 NOTE — ED ADULT NURSE NOTE - OBJECTIVE STATEMENT
35 y/o male pmhx autism, testicular cancer, type 2 diabetes, and impulse control disorder, BIB [facility] (not under arrest) for psychiatric evaluation s/p self-inflicted cut to right hand dorsum using kitchen knife he obtained at [facility] cabinet.   Patient reports that he was emotionally upset after not being able to see or talk with his former girlfriend and that he misses her.  Patient denies that he was actively trying to hurt or kill himself.  Patient denies any active A/V hallucinations.  Belongs and valuables sent to security, 1:1 at bedside,EKG performed labs and urine sent. NAD at this time

## 2024-12-16 NOTE — ED BEHAVIORAL HEALTH ASSESSMENT NOTE - NSSUICPROTFACT_PSY_ALL_CORE
supportive housing, supervised setting/Identifies reasons for living/Positive therapeutic relationships

## 2024-12-16 NOTE — ED BEHAVIORAL HEALTH ASSESSMENT NOTE - RISK ASSESSMENT
Patient is at moderate acute suicide risk, currently denies SI, plan or intent. Does have hx of impulsive behavior and stabbed his abdominal wound with plastic utensils while at [facility]. Protective factors include relationship with family (mother & sister). Risk factors include hx of ASD, impulse control disorder, hx of trauma (PTSD). He denies having access to lethal methods. Patient is at elevated chronic suicide risk due to hx of SIB and impulsivity. Patient is at moderate risk for potential violence, has hx of aggression towards property and others, states that he was arrested twice.

## 2024-12-16 NOTE — ED BEHAVIORAL HEALTH ASSESSMENT NOTE - DETAILS
bullying pt reports chronic hx fluctuating suicidal thoughts hx aggression towards staff pt requested to be brought to ED pt declines Zyprexa - rash

## 2024-12-16 NOTE — ED BEHAVIORAL HEALTH ASSESSMENT NOTE - OTHER
peer discussed indications to return to ED limited  at [facility] domiciled in supervised setting chronically impaired by hx staff member at [facility]

## 2024-12-16 NOTE — ED BEHAVIORAL HEALTH ASSESSMENT NOTE - ADDITIONAL DETAILS ALL
describes suicidal thoughts contingent only on discharge; hx cutting self with blunt objects (e.g. credit card, spoons), reportedly also stabbed himself with spoons at [facility]

## 2024-12-16 NOTE — ED BEHAVIORAL HEALTH ASSESSMENT NOTE - HPI (INCLUDE ILLNESS QUALITY, SEVERITY, DURATION, TIMING, CONTEXT, MODIFYING FACTORS, ASSOCIATED SIGNS AND SYMPTOMS)
34 year old male, single, domiciled at [facility] (with 1:1), employed as assistant reader at [employer], with PMH asthma, DM, BPH, urinary retention, hx testicular cancer s/p orchidectomy, GERD, hypothyroidism, HTN, HLD, myopia, PPH autism spectrum disorder, moderate intellectual disability; other diagnoses includes bipolar disorder, impulse control disorder, HAVEN, mood disorder NOS, PTSD, ADHD and factitious disorder. with multiple past inpatient psychiatric admissions (as per [facility] staff, most recently 4/2024, hx of cutting himself, charted longstanding hx of endorsing SI (situational: mostly stemming from discontent of living environment) as well as hx of aggression toward group home staff; BIBEMS a/b patient reporting suicidal ideation, recent self harm with superficial abrasions to stomach and forearm.     On eval, pt presents with tired but euthymic affect, concrete thought process, preoccupied with admission. States that he doesn't know what happened today, feels like it was a good day, but cut himself on his arm and stomach because he wasn't feeling happy. States that he had gone into the kitchen in the morning and afternoon to sneak out a knife. He denies any particular trigger, but states that he 'wants to be admitted to inpatient psych'. He reports hx suicidal thoughts but denies intent or plan to harm self, describes these thoughts as ego-dystonic. He reports difficulty sleeping at home but feels tired in the ER. Denies any substance use and reports ongoing follow-up with psychiatric appointments as arranged by [facility], is taking Haldol 75 mg OCONNOR monthly.     Psychoeducation provided, discussed stressors, safety plan, and indications to return to ED. Pt reports feeling better while in the ED, agrees with plan to return back to [facility] with staff member.     Collateral:  KAYLIE (, [facility] [employer], [phone]): States that pt is well known to [facility], frequently calls EMS to be brought to the hospital. Had also presented at [place] ED earlier today and discharged a few hrs ago. States that pt has a hx of cutting himself with blunt objects, aware of current abrasions. Pt is on constant observation and has no access to means of harm. Agrees with plan to discharge back to [facility]. Next scheduled outpatient appointment in January, but states that group home can arrange earlier follow-up.    PSYCKES: Not available  ISTOP: Not available  Chart reviewed: Has several recent ED visits for same--per past notes frequenting EDs reporting SI, requesting admission is consistent w pt's baseline. Pt also medically admitted to [facility] 11/18/2024-11/29/2024 for urinary retention. CL consulted during the admission as pt also requested inpatient psych admission, psychiatrically cleared.

## 2024-12-16 NOTE — ED PROVIDER NOTE - PATIENT PORTAL LINK FT
You can access the FollowMyHealth Patient Portal offered by [facility] by registering at the following website: [email or web address]. By joining [employer]’s FollowMyHealth portal, you will also be able to view your health information using other applications (apps) compatible with our system.

## 2024-12-16 NOTE — ED PROVIDER NOTE - OBJECTIVE STATEMENT
34-year-old male with history of autism, testicular cancer, type 2 diabetes, and pulse control disorder, hyperlipidemia, hypothyroidism, factitious disorder presents to the ER complaining of suicidal ideation.  Patient states he recently had his Haldol increased has been feeling suicidal.  Endorses self-harm through cutting himself on his stomach and left forearm with a kitchen knife.  Patient also complaining of homicidal ideations with no plan with specific target.  Patient also complaining of shortness of breath.  No lower extremity edema, chest pain, fever, chills, cough.

## 2024-12-16 NOTE — ED PROVIDER NOTE - OBJECTIVE STATEMENT
35 y/o WM with history of autism, testicular cancer, type 2 diabetes, and impulse control disorder, hyperlipidemia, hypothyroidism, factitious disorder, BIB [facility] (not under arrest) for psychiatric evaluation s/p self-inflicted cut to right hand dorsum using kitchen knife he obtained at [facility] cabinet.   Patient reports that he was emotionally upset after not being able to see or talk with his former girlfriend and that he misses her.  Patient denies that he was actively trying to hurt or kill himself.  Patient denies any active A/V hallucinations.    ED eval last night regarding self-inflicted abrasions to abdominal and left forearm skin with kitchen knife he obtained at group home subsequently cleared by psychiatry consult, wound superficial and patient discharged back home to group home. 33 y/o WM with history of autism, testicular CA, DM2, and impulse control disorder, hyperlipidemia, hypothyroidism, factitious disorder, BIB [facility] (not under arrest) for psychiatric evaluation s/p self-inflicted cut to right hand dorsum using kitchen knife he obtained from group home cabinet.   Patient reports that he was emotionally upset after not being able to see or talk with his former girlfriend and that he misses her.  Patient denies that he was actively trying to hurt or kill himself.  Patient denies any active A/V hallucinations.    ED eval last night regarding similar self-inflicted abrasions to abdominal and left forearm skin with kitchen knife he obtained at group home: subsequently cleared by psychiatry consult, wounds superficial and patient discharged back home to group [facility].

## 2024-12-16 NOTE — ED BEHAVIORAL HEALTH ASSESSMENT NOTE - DESCRIPTION
asthma, DM, BPH, urinary retention, hx testicular cancer s/p orchidectomy, GERD, hypothyroidism, HTN, HLD, myopia domiciled at [facility] since 9/2024, previously lived in group home in [place], pt reports being transferred d/t bullying Pt in good behavioral control in ED    Vital Signs Last 24 Hrs  T(C): 36.6 (15 Dec 2024 23:52), Max: 36.6 (15 Dec 2024 23:52)  T(F): 97.8 (15 Dec 2024 23:52), Max: 97.8 (15 Dec 2024 23:52)  HR: 92 (15 Dec 2024 23:52) (92 - 92)  BP: 122/73 (15 Dec 2024 23:52) (122/73 - 122/73)  BP(mean): --  RR: 16 (15 Dec 2024 23:52) (16 - 16)  SpO2: 100% (15 Dec 2024 23:52) (100% - 100%)    Parameters below as of 15 Dec 2024 23:52  Patient On (Oxygen Delivery Method): room air

## 2024-12-16 NOTE — ED ADULT NURSE NOTE - OBJECTIVE STATEMENT
pt presents to ed with [facility] #7493 from group home c/o chest pain and suicidal and homicidal thoughts. pt states he had chest pains "all day". pt endorsing sob; satting 100% on room air. pt stating "I cut my belly with a knife today to die". cut is scabbed over and appears to be older. pt admits to wanting to hurt "everyone". 1:1 intitiated in triage and pt belongings brought to security. EKG done.

## 2024-12-16 NOTE — ED ADULT NURSE NOTE - CHIEF COMPLAINT QUOTE
patient brought in by EMS from [facility] w/ TY badge # [id number] for self injurious behavior.  cut right hand with knife.  1:1 initiated in triage.

## 2024-12-16 NOTE — ED PROVIDER NOTE - NSFOLLOWUPINSTRUCTIONS_ED_ALL_ED_FT
Medical & psychiatric screening evaluation of Markus: he is cleared to return to group home.  He is not acutely suicidal.  He is suitable to return to group home & re-engage in Programs.    ALL KNIVES need to be removed from residents' access and/or locked up.  This is very important.  Markus does have impulse control disorder with associated intellectual disability due to autism, for that he is chronically at risk of inappropriately acting out & potentially hurting himself & should NOT have access to sharp utensils.    Local abrasion care as per below.      Abrasion    WHAT YOU NEED TO KNOW:    An abrasion is a scrape on your skin. It happens when your skin rubs against a rough surface. Some examples of an abrasion include rug burn, a skinned elbow, or road rash. Abrasions can be many shapes and sizes. The wound may hurt, bleed, bruise, or swell.     DISCHARGE INSTRUCTIONS:    Return to the emergency department if:     The bleeding does not stop after 10 minutes of firm pressure.      You cannot rinse one or more foreign objects out of your wound.      You have red streaks on your skin coming from your wound.    Contact your healthcare provider if:     You have a fever or chills.       Your abrasion is red, warm, swollen, or draining pus.      You have questions or concerns about your condition or care.    Care for your abrasion:     Wash your hands and dry them with a clean towel.      Press a clean cloth against your wound to stop any bleeding.      Rinse your wound with a lot of clean water. Do not use harsh soap, alcohol, or iodine solutions.      Use a clean, wet cloth to remove any objects, such as small pieces of rocks or dirt.      Rub antibiotic ointment on your wound. This may help prevent infection and help your wound heal.      Cover the wound with a non-stick bandage. Change the bandage daily, and if gets wet or dirty.     Follow up with your healthcare provider as directed: Write down your questions so you remember to ask them during your visits.

## 2024-12-16 NOTE — ED PROVIDER NOTE - CLINICAL SUMMARY MEDICAL DECISION MAKING FREE TEXT BOX
Patient presents to the ER for suicidal ideation, self-harm, homicidal ideation, shortness of breath.  Patient has clear lung sounds and is not in any respiratory distress.  Vital signs including respiratory rate and oxygen saturation are normal.  EKG is normal sinus.  Patient has abrasions on abdomen and left forearm that he states he caused today with a kitchen knife.  There is no active bleeding or laceration for repair.  Labs ordered for medical clearance.  Telepsych to be consulted.

## 2024-12-16 NOTE — ED BEHAVIORAL HEALTH ASSESSMENT NOTE - CURRENT MEDICATION
Pt reports taking Haldol 75 mg OCONNOR    Per last inpatient note 11/29/2024:  MEDICATIONS  (STANDING):  atorvastatin 10 milliGRAM(s) Oral at bedtime  busPIRone 15 milliGRAM(s) Oral <User Schedule>  cloNIDine 0.1 milliGRAM(s) Oral three times a day  dextrose 5%. 1000 milliLiter(s) (100 mL/Hr) IV Continuous <Continuous>  dextrose 5%. 1000 milliLiter(s) (50 mL/Hr) IV Continuous <Continuous>  dextrose 50% Injectable 25 Gram(s) IV Push once  dextrose 50% Injectable 12.5 Gram(s) IV Push once  dextrose 50% Injectable 25 Gram(s) IV Push once  divalproex ER 1000 milliGRAM(s) Oral at bedtime  divalproex  milliGRAM(s) Oral daily  enoxaparin Injectable 40 milliGRAM(s) SubCutaneous every 12 hours  ferrous    sulfate 325 milliGRAM(s) Oral at bedtime  glucagon  Injectable 1 milliGRAM(s) IntraMuscular once  insulin lispro (ADMELOG) corrective regimen sliding scale   SubCutaneous three times a day before meals  levothyroxine 50 MICROGram(s) Oral daily  lidocaine 2% Gel 1 Application(s) Topical at bedtime  mirtazapine 7.5 milliGRAM(s) Oral at bedtime  nystatin Powder 1 Application(s) Topical two times a day  pantoprazole    Tablet 40 milliGRAM(s) Oral before breakfast  risperiDONE   Tablet 4 milliGRAM(s) Oral two times a day  senna 2 Tablet(s) Oral at bedtime  tamsulosin 0.8 milliGRAM(s) Oral at bedtime

## 2024-12-16 NOTE — ED PROVIDER NOTE - CARE PLAN
Principal Discharge DX:	Impulse control disorder  Secondary Diagnosis:	Intellectual disability  Secondary Diagnosis:	Autism   1

## 2024-12-16 NOTE — ED BEHAVIORAL HEALTH ASSESSMENT NOTE - OTHER PAST PSYCHIATRIC HISTORY (INCLUDE DETAILS REGARDING ONSET, COURSE OF ILLNESS, INPATIENT/OUTPATIENT TREATMENT)
multiple past inpatient psychiatric admissions (as per [facility] staff, most recently 4/2024, hx of cutting himself, charted longstanding hx of endorsing SI, no known prior suicide attempts or significant violence; in outpatient treatment at [place]

## 2024-12-16 NOTE — ED PROVIDER NOTE - PROGRESS NOTE DETAILS
Labs nonactionable. Patient comfortable and well appearing. Remains with out resp distress and repeat lung exam is clear to ascultation again. Patient seen by telepsych and cleared to return to group home. Ambulating independently in ED. Tolerating PO. Stable for dc. Patient and aide verbalize understanding of return precautions and f/u.

## 2024-12-16 NOTE — ED PROVIDER NOTE - CLINICAL SUMMARY MEDICAL DECISION MAKING FREE TEXT BOX
35 y/o WM with history of autism, testicular cancer, type 2 diabetes, and impulse control disorder, hyperlipidemia, hypothyroidism, factitious disorder, BIB [facility] (not under arrest) for psychiatric evaluation s/p self-inflicted cut to right hand dorsum using kitchen knife he obtained at group home cabinet.   ED eval last night for similar abrasions to L FA & abdomen skin by kitchen knife he obtained at group home.    Plan: 1:1 observation, EKG, psych labs, Tele-Psych consult. Local wound care to superf. abrasions. 33 y/o WM with history of autism, testicular cancer, type 2 diabetes, and impulse control disorder, hyperlipidemia, hypothyroidism, factitious disorder, BIB [facility] (not under arrest) for psychiatric evaluation s/p self-inflicted cut to right hand dorsum using kitchen knife he obtained at group home cabinet.   ED eval last night for similar abrasions to L FA & abdomen skin by kitchen knife he obtained at group home.    Plan: 1:1 observation, EKG, psych labs, Tele-Psych consult. Local wound care to superf. abrasions.    20:20, C Porsche LEMONS:  Case d/w Tele-Psychiatry: pt evaluated last night by Tele-Psychiatry for similar self-inflicted abrasions incurred by kitchen knife obtained at group home: wounds + superficial & pt determined not suicidal.  Pt similarly NOT suicidal today, Dx impulse control d/o w/ associated intellectual disability d/t autism.  Tele-Psychiatry agrees pt does NOT warrant a new consult for the same situation that he was already cleared on, but rather can be DC'ed back to group home & that the group home needs to lock up ALL KNIVES out of reach of the residents. 33 y/o WM, PMH incl: autism, testicular CA, DM2, impulse control disorder, HLD, hypothyroidism, factitious disorder, BIB [facility] (not under arrest) for psychiatric evaluation s/p self-inflicted cut to right hand dorsum using kitchen knife he obtained from group home cabinet.   ED eval last night for similar abrasions to L FA & abdomen skin by kitchen knife he obtained at group home as well.    Plan: 1:1 observation, EKG, psych labs, Tele-Psych consult. Local wound care to superf. abrasions.    20:20, C Porsche LEMONS:  Case d/w Tele-Psychiatry: pt evaluated last night by Tele-Psychiatry for similar self-inflicted abrasions incurred by kitchen knife obtained at group home: wounds + superficial & pt determined not suicidal.  Pt similarly NOT suicidal today, Dx impulse control d/o w/ associated intellectual disability d/t autism.  Tele-Psychiatry agrees pt does NOT warrant a new consult for the same situation that he was already cleared on, but rather can be DC'ed back to group [place] & that the group home needs to lock up ALL KNIVES out of reach of the residents.

## 2024-12-16 NOTE — ED ADULT NURSE NOTE - CHIEF COMPLAINT QUOTE
Pt [facility] and [facility] from group home c/o SOB. Pt has hx asthma, per ems pt expressed SI/HI with audible hallucinations telling him to "attack somebody". Pt O2 100% RA, sent for EKG. 1:1 initiated in triage. No other medical complaints at this time.

## 2024-12-16 NOTE — ED ADULT NURSE REASSESSMENT NOTE - NS ED NURSE REASSESS COMMENT FT1
pt resting in bed, respirations even and unlabored. spoke with [facility] levi on the phone about  returning so that the pt can be sent back to facility, awaiting  return.

## 2024-12-16 NOTE — ED PROVIDER NOTE - PHYSICAL EXAMINATION
Gen'l: Overweight WM adult in NAD, no respiratory discomfort, no sentence shortening, not acutely ill.  Head: NC/AT  Eyes: PERRL, EOMI  ENT: O/P clear, mmm  CV: RRR, normal radial pulse  Lungs: CTA, normal respirations  GI: soft, NT, BS+  : Deferred, no flank nor CVAT  Neck: NT, supple w/o pain  MSK: SALAS x 4, no focal extremity swelling nor tenderness, B/L SLR 35 degrees w/o pain, normal motor  Skin: no tactile warmth, no rash.  + [id number] abrasions to R hand dorsum, L Fa & abdominal skin, no active bleeding nor e/o infection.  Neuro: A+O x 4, CN 2 -12 intact, normal speech, no focal motor/sensory deficits  Psych: calm, cooperative, no obvious active SI/HI, no A/V hallucinations Gen'l: Overweight WM adult in NAD, no respiratory discomfort, no sentence shortening, not acutely ill.  Head: NC/AT  Eyes: PERRL, EOMI  ENT: O/P clear, mmm  CV: RRR, normal radial pulse  Lungs: CTA, normal respirations  GI: soft, NT, BS+  : Deferred, no flank nor CVAT  Neck: NT, supple w/o pain  MSK: SALAS x 4, no focal extremity swelling nor tenderness, B/L SLR 35 degrees w/o pain, normal motor  Skin: no tactile warmth, no rash.  + Superficial abrasions to R hand dorsum, L FA & abdominal skin, no active bleeding nor e/o infection.  Neuro: A+O x 4, CN 2 -12 intact, normal speech, no focal motor/sensory deficits  Psych: calm, cooperative, no obvious active SI/HI, no A/V hallucinations

## 2024-12-16 NOTE — ED ADULT TRIAGE NOTE - SPO2 (%)
Anesthesia Post Evaluation    Patient: Celso Hernandez    Procedure(s) Performed: Procedure(s) (LRB):  CYSTOSCOPY (N/A)  DILATION, URETHRA (N/A)    Final Anesthesia Type: general    Patient location during evaluation: PACU  Patient participation: Yes- Able to Participate  Level of consciousness: awake and alert  Post-procedure vital signs: reviewed and stable  Pain management: adequate  Airway patency: patent    PONV status at discharge: No PONV  Anesthetic complications: no      Cardiovascular status: blood pressure returned to baseline  Respiratory status: spontaneous ventilation and room air  Hydration status: euvolemic  Follow-up not needed.          Vitals Value Taken Time   /91 08/05/20 1332   Temp 36.9 °C (98.5 °F) 08/05/20 1226   Pulse 61 08/05/20 1404   Resp 22 08/05/20 1334   SpO2 99 % 08/05/20 1404   Vitals shown include unvalidated device data.      No case tracking events are documented in the log.      Pain/Keely Score: Keely Score: 9 (8/5/2020 12:26 PM)        
98

## 2024-12-16 NOTE — ED PROVIDER NOTE - NSFOLLOWUPINSTRUCTIONS_ED_ALL_ED_FT
Cleared to return to group home activities.     1) Please follow-up with your primary care doctor in the next 5-7 days.  Please call tomorrow for an appointment.  If you cannot follow-up with your primary care doctor please return to the ED for any urgent issues.  2) You were given a copy of the tests performed today.  Please bring the results with you and review them with your primary care doctor.  3) If you have any worsening of symptoms or any other concerns please return to the ED immediately.  4) Please continue taking your home medications as directed.

## 2024-12-16 NOTE — ED PROVIDER NOTE - PHYSICAL EXAMINATION
Const: Well appearing, NAD  Eyes: PERRL, EOM intact  CV: RRR, no murmurs, no chest wall tenderness, distal pulses intact  Resp: CTAB, normal resp effort  GI: soft, nondistended, nontender. No CVA tenderness  MSK: Full ROM, no muscle or bony deformity or tenderness  Neuro: AOx3, GCS 15, No focal deficits  Skin: Abrasion on abdomen to right of the umbilicus about 4cm in length. Two 1cm abrasions on left forearm.  Psych: calm, cooperative.

## 2024-12-16 NOTE — ED PROVIDER NOTE - PATIENT PORTAL LINK FT
You can access the FollowMyHealth Patient Portal offered by [facility] by registering at the following website: [email or web address]. By joining [employer]’s FollowMyHealth portal, you will also be able to view your health information using other applications (apps) compatible with our system.

## 2024-12-23 NOTE — ED ADULT TRIAGE NOTE - ARRIVAL FROM
[facility]  
[facility]/Assisted living facility

## 2024-12-29 ENCOUNTER — EMERGENCY (EMERGENCY)
Facility: HOSPITAL | Age: 34
LOS: 1 days | Discharge: HOME | End: 2024-12-29
Attending: STUDENT IN AN ORGANIZED HEALTH CARE EDUCATION/TRAINING PROGRAM | Admitting: STUDENT IN AN ORGANIZED HEALTH CARE EDUCATION/TRAINING PROGRAM
Payer: MEDICAID

## 2024-12-29 VITALS
TEMPERATURE: 98 F | HEIGHT: 69 IN | WEIGHT: 300.05 LBS | OXYGEN SATURATION: 97 % | RESPIRATION RATE: 18 BRPM | DIASTOLIC BLOOD PRESSURE: 89 MMHG | SYSTOLIC BLOOD PRESSURE: 114 MMHG | HEART RATE: 99 BPM

## 2024-12-29 VITALS
DIASTOLIC BLOOD PRESSURE: 87 MMHG | RESPIRATION RATE: 17 BRPM | TEMPERATURE: 98 F | SYSTOLIC BLOOD PRESSURE: 122 MMHG | HEART RATE: 91 BPM | OXYGEN SATURATION: 97 %

## 2024-12-29 DIAGNOSIS — Z90.79 ACQUIRED ABSENCE OF OTHER GENITAL ORGAN(S): Chronic | ICD-10-CM

## 2024-12-29 LAB
APPEARANCE UR: CLEAR — SIGNIFICANT CHANGE UP
BILIRUB UR-MCNC: NEGATIVE — SIGNIFICANT CHANGE UP
COLOR SPEC: YELLOW — SIGNIFICANT CHANGE UP
DIFF PNL FLD: NEGATIVE — SIGNIFICANT CHANGE UP
GLUCOSE UR QL: NEGATIVE MG/DL — SIGNIFICANT CHANGE UP
KETONES UR-MCNC: ABNORMAL MG/DL
LEUKOCYTE ESTERASE UR-ACNC: NEGATIVE — SIGNIFICANT CHANGE UP
NITRITE UR-MCNC: NEGATIVE — SIGNIFICANT CHANGE UP
PH UR: 5.5 — SIGNIFICANT CHANGE UP (ref 5–8)
PROT UR-MCNC: NEGATIVE MG/DL — SIGNIFICANT CHANGE UP
SP GR SPEC: 1.02 — SIGNIFICANT CHANGE UP (ref 1–1.03)
UROBILINOGEN FLD QL: 0.2 MG/DL — SIGNIFICANT CHANGE UP (ref 0.2–1)

## 2024-12-29 PROCEDURE — 99283 EMERGENCY DEPT VISIT LOW MDM: CPT

## 2024-12-29 PROCEDURE — 81003 URINALYSIS AUTO W/O SCOPE: CPT

## 2024-12-29 PROCEDURE — 99285 EMERGENCY DEPT VISIT HI MDM: CPT

## 2024-12-29 NOTE — ED PROVIDER NOTE - CARE PROVIDER_API CALL
Zechariah Slaughter  Urology  [address], [address]  [place] [address]  Phone: [phone]  Fax: [phone]  Follow Up Time:

## 2024-12-29 NOTE — ED PROVIDER NOTE - CLINICAL SUMMARY MEDICAL DECISION MAKING FREE TEXT BOX
Patient is a 34y old  Male who presents with a chief complaint of difficulty urinating.    Patient states he is NOT having any thoughts of harming himself or killing himself, denies any hallucinations. he states that was over a week ago but he saw a psychiatrist and is better now. states his only complaint is his urine. no need for 1:1 patient. he was just seen by psychiatrist on 12/16 for those reported symptoms and was cleared without need for inpatient psych. 34M who is well unknown to this department, PMH of bipolar, autism, partial IDD who presents with urinary complaints. patient presented from his group home stating that he is having difficulty urinating but admits to urinating twice today. he mentioned in triage that he was also having suicidal thoughts and wanting to harm himslef and that he was hearing voices however he adamantly denies those statements when I spoke with him. he is known to come to the ED to get away from his group home. he was seen by psychiatry and cleared on 12/19 for similar complaints. he has had foleys in place as well in the past however passes TOV, currently no mckeon    Patient states he is NOT having any thoughts of harming himself or killing himself, denies any hallucinations. he states that was over a week ago but he saw a psychiatrist and is better now. states his only complaint is his urine. no need for 1:1 patient. he was just seen by psychiatrist on 12/16 for those reported symptoms and was cleared without need for inpatient psych.    plan for UA and reassess. his post void at this time is 70 cc, not in acute urinary retention 34M who is well unknown to this department, PMH of bipolar, autism, partial IDD who presents with urinary complaints. patient presented from his group home stating that he is having difficulty urinating but admits to urinating twice today. he mentioned in triage that he was also having suicidal thoughts and wanting to harm himslef and that he was hearing voices however he adamantly denies those statements when I spoke with him. he is known to come to the ED to get away from his group home. he was seen by psychiatry and cleared on 12/19 for similar complaints. he has had foleys in place as well in the past however passes TOV, currently no mckeon    Patient states he is NOT having any thoughts of harming himself or killing himself, denies any hallucinations. he states that was over a week ago but he saw a psychiatrist and is better now. states his only complaint is his urine. no need for 1:1 patient. he was just seen by psychiatrist on 12/16 for those reported symptoms and was cleared without need for inpatient psych.    plan for UA and reassess. his post void at this time is 70 cc, not in acute urinary retention    Patient was re-evaluated at this time and they are feeling much better. he is not currently in retention and his UA is not consistent with UTI. The Patient will be discharged home at this time. Their lab  results were explained with the patient and they were instructed to go over them with their PCP. All questions were answered at this time    Patient was told to follow up with their PCP within the next 2 days. they were told to return to the ED if they have unable to urinate, fevers, vomiting    patient will be discharged home at this time, they are in agreement with the plan

## 2024-12-29 NOTE — ED PROVIDER NOTE - NS ED MD DISPO DISCHARGE
Pt. Sleeping in bed, denies needs at this time. Wife bedside. Bed locked and low, call bell in reach. Home

## 2024-12-29 NOTE — ED PROVIDER NOTE - OBJECTIVE STATEMENT
34M who is well unknown to this department, PMH of bipolar, autism, partial IDD who presents with urinary complaints. patient presented from his group home stating that he is having difficulty urinating but admits to urinating twice today. he mentioned in triage that he was also having suicidal thoughts and wanting to harm himslef and that he was hearing voices however he adamantly denies those statements when I spoke with him. he is known to come to the ED to get away from his group home. he was seen by psychiatry and cleared on 12/19 for similar complaints. he has had foleys in place as well in the past however passes TOV, currently no mckeon

## 2024-12-29 NOTE — ED ADULT NURSE NOTE - NS_BH TRG QUESTION1_ED_ALL_ED
WilsonNick fernandez A  Male, 61 year old, 1962  MRN: [id number]  Bed: A      Please call pt to schedule ERCP with Dr. Alonso.       Schedule Procedure:   Please Schedule 3-4 months  Procedure: ERCP (72518)  Diagnosis: Bile duct stricture K83.1 and Biliary Stent Removal Z46.89  Is patient:             Diabetic? No  On Non-Insulin Drug? No  ANTIPLATELET / ANTICOAGULATION: MEDICATION:  Continue Aspirin  Latex allergy: No  Sleep apnea: No  Location: [facility]  Special Instructions:   General Anesthesia  For MAC/GA patients if applicable, please verify to hold medications prior to procedure: Selegiline patches x 10 days  Sildenafil, Verdenafil, Tadalafil x 48 hours, and Monoamine oxidase inhibitor (MAOIs), angiotensin receptor blockers, renin inhibitors, ACE inhibitors, diuretics (unless in combination with beta blocker) day of procedure.         No

## 2024-12-29 NOTE — ED PROVIDER NOTE - NSFOLLOWUPINSTRUCTIONS_ED_ALL_ED_FT
Please return to the Emergency Department immediately for fevers, difficulty urinating, and if you have any other problems or concerns. Please follow up with your Primary Care Physician within the next 2 days.    Please take any prescription medications prescribed as instructed    Please follow up with urology within the next 2 days as well

## 2024-12-29 NOTE — ED PROVIDER NOTE - IV ALTEPLASE EXCL REL HIDDEN
----- Message from Rik Ruiz sent at 10/23/2024  3:38 PM EDT -----  No significant blockages noted on cardiac CT scan.  Acceptable risk for major perioperative cardiovascular complications with upcoming orthopedic procedure.   show

## 2024-12-29 NOTE — ED PROVIDER NOTE - PROGRESS NOTE DETAILS
Patient states he is NOT having any thoughts of harming himself or killing himself, denies any hallucinations. he states that was over a week ago but he saw a psychiatrist and is better now. states his only complaint is his urine. no need for 1:1 patient. he was just seen by psychiatrist on 12/16 for those reported symptoms and was cleared without need for inpatient psych.

## 2024-12-29 NOTE — ED ADULT NURSE NOTE - OBJECTIVE STATEMENT
patient presents to ED from facility with the complaint of being unable to void since this morning.  patient has changed his story multiple times while speaking to the RN.  patient is stating now that he urinated in his bed this morning and it was a " little puddle".  then he stated that he urinated in the bathroom maybe an hour later.  since then he has not urinated at all.  patient feels pressure in his bladder region.  RN bladder scanned patient and 74ML resulted.  patient also presents with wounds to the top of both of his hands as well as circular wounds to B/L arms.  patient states he is " cutting " himself with plastic that he breaks in his room.  when patient is asked why he is cutting himself he states because he is stressed with his life.  patient does not want to " die " and does not have a plan to " kill" himself.  patient also states that he has been cutting himself  " all his life"

## 2024-12-29 NOTE — ED ADULT NURSE REASSESSMENT NOTE - NS ED NURSE REASSESS COMMENT FT1
received pt in rm 12a, no complaints of pain or discomfort @ this time. pending urine. 1:1 discontinued as per MD. pt denies self harm, SI/HI. NAD.

## 2024-12-29 NOTE — ED ADULT NURSE NOTE - CCCP TRG CHIEF CMPLNT
Patient has small hiatal hernia documented on 10/31/2027 and I called patient to cancel her appointment for tomorrow with this information at 5:40 pm.   Patient was unaware of this finding. Thanked patient for willingness to participate.    urinary retention/anxiety

## 2024-12-29 NOTE — ED PROVIDER NOTE - PATIENT PORTAL LINK FT
You can access the FollowMyHealth Patient Portal offered by [facility] by registering at the following website: [email or web address]. By joining [employer]’s FollowMyHealth portal, you will also be able to view your health information using other applications (apps) compatible with our system.

## 2024-12-30 NOTE — H&P ADULT - PROBLEM SELECTOR PROBLEM 1
Infusion Nursing Note:  Sushil Diana presents today for Zometa.    Patient seen by provider today: No   present during visit today: Not Applicable.    Note: Patient arrives ambulatory with wife present. Denies major changes with health since last visit. Mild fatigue level. Arthritis, pain in back and L hip- 5/10 today. Sleeping ok. VSS, afebrile.       Intravenous Access:  Peripheral IV placed.    Treatment Conditions:  Lab Results   Component Value Date    HGB 11.3 (L) 12/30/2024    WBC 11.4 (H) 12/30/2024    ANEU 6.3 12/15/2020    ANEUTAUTO 10.1 (H) 12/30/2024     12/30/2024        Lab Results   Component Value Date     (L) 12/30/2024    POTASSIUM 4.2 12/30/2024    CR 1.26 (H) 12/30/2024    ROBERT 9.1 12/30/2024    BILITOTAL 1.0 12/30/2024    ALBUMIN 4.5 12/30/2024    ALT 12 12/30/2024    AST 19 12/30/2024       Results reviewed, labs MET treatment parameters, ok to proceed with treatment.      Post Infusion Assessment:  Patient tolerated infusion without incident.  Blood return noted pre and post infusion.  Site patent and intact, free from redness, edema or discomfort.  No evidence of extravasations.  PIV access discontinued per protocol.       Discharge Plan:   AVS to patient via MYCHART.  Patient will return in 3 months for next appointment.   Patient discharged in stable condition accompanied by: wife.  Departure Mode: Ambulatory.      Smitha Michael RN  
Pneumonia due to COVID-19 virus

## 2025-01-02 NOTE — ED BEHAVIORAL HEALTH ASSESSMENT NOTE - DOMICILE TYPE
Pt has not arrived to facility for scheduled surgery yet. Called all phone numbers available on chart without success. HIP[employer] approved messages left, provided with call back number. Mariana CHILEL notified.   Other

## 2025-01-08 ENCOUNTER — EMERGENCY (EMERGENCY)
Facility: HOSPITAL | Age: 35
LOS: 1 days | Discharge: HOME | End: 2025-01-08
Attending: EMERGENCY MEDICINE | Admitting: EMERGENCY MEDICINE
Payer: MEDICAID

## 2025-01-08 VITALS
OXYGEN SATURATION: 99 % | SYSTOLIC BLOOD PRESSURE: 125 MMHG | WEIGHT: 294.1 LBS | RESPIRATION RATE: 19 BRPM | HEART RATE: 82 BPM | TEMPERATURE: 98 F | DIASTOLIC BLOOD PRESSURE: 94 MMHG | HEIGHT: 69 IN

## 2025-01-08 DIAGNOSIS — Z90.79 ACQUIRED ABSENCE OF OTHER GENITAL ORGAN(S): Chronic | ICD-10-CM

## 2025-01-08 LAB
ALBUMIN SERPL ELPH-MCNC: 3.5 G/DL — SIGNIFICANT CHANGE UP (ref 3.3–5)
ALP SERPL-CCNC: 88 U/L — SIGNIFICANT CHANGE UP (ref 40–120)
ALT FLD-CCNC: 37 U/L — SIGNIFICANT CHANGE UP (ref 12–78)
ANION GAP SERPL CALC-SCNC: 6 MMOL/L — SIGNIFICANT CHANGE UP (ref 5–17)
APPEARANCE UR: CLEAR — SIGNIFICANT CHANGE UP
APTT BLD: 32.2 SEC — SIGNIFICANT CHANGE UP (ref 24.5–35.6)
AST SERPL-CCNC: 20 U/L — SIGNIFICANT CHANGE UP (ref 15–37)
BASOPHILS # BLD AUTO: 0.04 K/UL — SIGNIFICANT CHANGE UP (ref 0–0.2)
BASOPHILS NFR BLD AUTO: 0.5 % — SIGNIFICANT CHANGE UP (ref 0–2)
BILIRUB SERPL-MCNC: 0.3 MG/DL — SIGNIFICANT CHANGE UP (ref 0.2–1.2)
BILIRUB UR-MCNC: NEGATIVE — SIGNIFICANT CHANGE UP
BUN SERPL-MCNC: 12 MG/DL — SIGNIFICANT CHANGE UP (ref 7–23)
CALCIUM SERPL-MCNC: 9.1 MG/DL — SIGNIFICANT CHANGE UP (ref 8.5–10.1)
CHLORIDE SERPL-SCNC: 104 MMOL/L — SIGNIFICANT CHANGE UP (ref 96–108)
CO2 SERPL-SCNC: 28 MMOL/L — SIGNIFICANT CHANGE UP (ref 22–31)
COLOR SPEC: YELLOW — SIGNIFICANT CHANGE UP
CREAT SERPL-MCNC: 0.95 MG/DL — SIGNIFICANT CHANGE UP (ref 0.5–1.3)
DIFF PNL FLD: NEGATIVE — SIGNIFICANT CHANGE UP
EGFR: 107 ML/MIN/1.73M2 — SIGNIFICANT CHANGE UP
EGFR: 107 ML/MIN/1.73M2 — SIGNIFICANT CHANGE UP
EOSINOPHIL # BLD AUTO: 0.3 K/UL — SIGNIFICANT CHANGE UP (ref 0–0.5)
EOSINOPHIL NFR BLD AUTO: 4.1 % — SIGNIFICANT CHANGE UP (ref 0–6)
GLUCOSE SERPL-MCNC: 118 MG/DL — HIGH (ref 70–99)
GLUCOSE UR QL: 100 MG/DL
HCT VFR BLD CALC: 42.5 % — SIGNIFICANT CHANGE UP (ref 39–50)
HGB BLD-MCNC: 13.7 G/DL — SIGNIFICANT CHANGE UP (ref 13–17)
IMM GRANULOCYTES NFR BLD AUTO: 1.5 % — HIGH (ref 0–0.9)
INR BLD: 0.96 RATIO — SIGNIFICANT CHANGE UP (ref 0.85–1.16)
KETONES UR-MCNC: ABNORMAL MG/DL
LEUKOCYTE ESTERASE UR-ACNC: NEGATIVE — SIGNIFICANT CHANGE UP
LYMPHOCYTES # BLD AUTO: 2.98 K/UL — SIGNIFICANT CHANGE UP (ref 1–3.3)
LYMPHOCYTES # BLD AUTO: 40.3 % — SIGNIFICANT CHANGE UP (ref 13–44)
MCHC RBC-ENTMCNC: 24.8 PG — LOW (ref 27–34)
MCHC RBC-ENTMCNC: 32.2 G/DL — SIGNIFICANT CHANGE UP (ref 32–36)
MCV RBC AUTO: 76.9 FL — LOW (ref 80–100)
MONOCYTES # BLD AUTO: 0.58 K/UL — SIGNIFICANT CHANGE UP (ref 0–0.9)
MONOCYTES NFR BLD AUTO: 7.8 % — SIGNIFICANT CHANGE UP (ref 2–14)
NEUTROPHILS # BLD AUTO: 3.38 K/UL — SIGNIFICANT CHANGE UP (ref 1.8–7.4)
NEUTROPHILS NFR BLD AUTO: 45.8 % — SIGNIFICANT CHANGE UP (ref 43–77)
NITRITE UR-MCNC: NEGATIVE — SIGNIFICANT CHANGE UP
NRBC # BLD: 0 /100 WBCS — SIGNIFICANT CHANGE UP (ref 0–0)
NRBC BLD-RTO: 0 /100 WBCS — SIGNIFICANT CHANGE UP (ref 0–0)
PH UR: 7 — SIGNIFICANT CHANGE UP (ref 5–8)
PLATELET # BLD AUTO: 257 K/UL — SIGNIFICANT CHANGE UP (ref 150–400)
POTASSIUM SERPL-MCNC: 4.4 MMOL/L — SIGNIFICANT CHANGE UP (ref 3.5–5.3)
POTASSIUM SERPL-SCNC: 4.4 MMOL/L — SIGNIFICANT CHANGE UP (ref 3.5–5.3)
PROT SERPL-MCNC: 7 G/DL — SIGNIFICANT CHANGE UP (ref 6–8.3)
PROT UR-MCNC: NEGATIVE MG/DL — SIGNIFICANT CHANGE UP
PROTHROM AB SERPL-ACNC: 11.3 SEC — SIGNIFICANT CHANGE UP (ref 9.9–13.4)
RBC # BLD: 5.53 M/UL — SIGNIFICANT CHANGE UP (ref 4.2–5.8)
RBC # FLD: 15.5 % — HIGH (ref 10.3–14.5)
SODIUM SERPL-SCNC: 138 MMOL/L — SIGNIFICANT CHANGE UP (ref 135–145)
SP GR SPEC: 1.02 — SIGNIFICANT CHANGE UP (ref 1–1.03)
UROBILINOGEN FLD QL: 1 MG/DL — SIGNIFICANT CHANGE UP (ref 0.2–1)
WBC # BLD: 7.39 K/UL — SIGNIFICANT CHANGE UP (ref 3.8–10.5)
WBC # FLD AUTO: 7.39 K/UL — SIGNIFICANT CHANGE UP (ref 3.8–10.5)

## 2025-01-08 PROCEDURE — 85610 PROTHROMBIN TIME: CPT

## 2025-01-08 PROCEDURE — 80053 COMPREHEN METABOLIC PANEL: CPT

## 2025-01-08 PROCEDURE — 99284 EMERGENCY DEPT VISIT MOD MDM: CPT

## 2025-01-08 PROCEDURE — 85730 THROMBOPLASTIN TIME PARTIAL: CPT

## 2025-01-08 PROCEDURE — 74176 CT ABD & PELVIS W/O CONTRAST: CPT | Mod: MC

## 2025-01-08 PROCEDURE — 81003 URINALYSIS AUTO W/O SCOPE: CPT

## 2025-01-08 PROCEDURE — 99284 EMERGENCY DEPT VISIT MOD MDM: CPT | Mod: 25

## 2025-01-08 PROCEDURE — 74176 CT ABD & PELVIS W/O CONTRAST: CPT | Mod: 26

## 2025-01-08 PROCEDURE — 36415 COLL VENOUS BLD VENIPUNCTURE: CPT

## 2025-01-08 PROCEDURE — 85025 COMPLETE CBC W/AUTO DIFF WBC: CPT

## 2025-01-08 PROCEDURE — 51702 INSERT TEMP BLADDER CATH: CPT

## 2025-01-08 RX ORDER — TAMSULOSIN HYDROCHLORIDE 0.4 MG/1
0.4 CAPSULE ORAL AT BEDTIME
Refills: 0 | Status: DISCONTINUED | OUTPATIENT
Start: 2025-01-08 | End: 2025-01-12

## 2025-01-08 RX ADMIN — TAMSULOSIN HYDROCHLORIDE 0.4 MILLIGRAM(S): 0.4 CAPSULE ORAL at 15:38

## 2025-01-10 ENCOUNTER — OUTPATIENT (OUTPATIENT)
Dept: OUTPATIENT SERVICES | Facility: HOSPITAL | Age: 35
LOS: 1 days | Discharge: HOME | End: 2025-01-10

## 2025-01-10 DIAGNOSIS — C62.90 MALIGNANT NEOPLASM OF UNSPECIFIED TESTIS, UNSPECIFIED WHETHER DESCENDED OR UNDESCENDED: ICD-10-CM

## 2025-01-10 DIAGNOSIS — Z90.79 ACQUIRED ABSENCE OF OTHER GENITAL ORGAN(S): Chronic | ICD-10-CM

## 2025-01-12 ENCOUNTER — EMERGENCY (EMERGENCY)
Facility: HOSPITAL | Age: 35
LOS: 0 days | Discharge: HOME | End: 2025-01-12
Attending: EMERGENCY MEDICINE
Payer: MEDICAID

## 2025-01-12 VITALS
DIASTOLIC BLOOD PRESSURE: 92 MMHG | HEART RATE: 120 BPM | RESPIRATION RATE: 18 BRPM | OXYGEN SATURATION: 97 % | TEMPERATURE: 98 F | HEIGHT: 69 IN | SYSTOLIC BLOOD PRESSURE: 149 MMHG

## 2025-01-12 VITALS
TEMPERATURE: 98 F | HEART RATE: 120 BPM | SYSTOLIC BLOOD PRESSURE: 130 MMHG | RESPIRATION RATE: 17 BRPM | DIASTOLIC BLOOD PRESSURE: 91 MMHG | OXYGEN SATURATION: 98 %

## 2025-01-12 DIAGNOSIS — E03.9 HYPOTHYROIDISM, UNSPECIFIED: ICD-10-CM

## 2025-01-12 DIAGNOSIS — F79 UNSPECIFIED INTELLECTUAL DISABILITIES: ICD-10-CM

## 2025-01-12 DIAGNOSIS — F84.0 AUTISTIC DISORDER: ICD-10-CM

## 2025-01-12 DIAGNOSIS — E78.5 HYPERLIPIDEMIA, UNSPECIFIED: ICD-10-CM

## 2025-01-12 DIAGNOSIS — F68.10 FACTITIOUS DISORDER IMPOSED ON SELF, UNSPECIFIED: ICD-10-CM

## 2025-01-12 DIAGNOSIS — S60.512A ABRASION OF LEFT HAND, INITIAL ENCOUNTER: ICD-10-CM
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
## 2025-01-12 DIAGNOSIS — E11.9 TYPE 2 DIABETES MELLITUS WITHOUT COMPLICATIONS: ICD-10-CM

## 2025-01-12 DIAGNOSIS — Z88.6 ALLERGY STATUS TO ANALGESIC AGENT: ICD-10-CM

## 2025-01-12 DIAGNOSIS — K21.9 GASTRO-ESOPHAGEAL REFLUX DISEASE WITHOUT ESOPHAGITIS: ICD-10-CM

## 2025-01-12 DIAGNOSIS — S60.511A ABRASION OF RIGHT HAND, INITIAL ENCOUNTER: ICD-10-CM

## 2025-01-12 DIAGNOSIS — M25.531 PAIN IN RIGHT WRIST: ICD-10-CM

## 2025-01-12 DIAGNOSIS — J45.909 UNSPECIFIED ASTHMA, UNCOMPLICATED: ICD-10-CM

## 2025-01-12 DIAGNOSIS — N40.0 BENIGN PROSTATIC HYPERPLASIA WITHOUT LOWER URINARY TRACT SYMPTOMS: ICD-10-CM

## 2025-01-12 DIAGNOSIS — Z88.8 ALLERGY STATUS TO OTHER DRUGS, MEDICAMENTS AND BIOLOGICAL SUBSTANCES: ICD-10-CM

## 2025-01-12 PROCEDURE — 73110 X-RAY EXAM OF WRIST: CPT | Mod: RT

## 2025-01-12 PROCEDURE — 99284 EMERGENCY DEPT VISIT MOD MDM: CPT | Mod: 25

## 2025-01-12 PROCEDURE — 99284 EMERGENCY DEPT VISIT MOD MDM: CPT

## 2025-01-12 PROCEDURE — 73130 X-RAY EXAM OF HAND: CPT | Mod: 26,RT

## 2025-01-12 PROCEDURE — 73110 X-RAY EXAM OF WRIST: CPT | Mod: 26,RT

## 2025-01-12 PROCEDURE — 73130 X-RAY EXAM OF HAND: CPT | Mod: RT

## 2025-01-12 NOTE — ED ADULT TRIAGE NOTE - CHIEF COMPLAINT QUOTE
pt BIBEMS from adult home c/o right wrist pain. pt notes he punched a wall last week and has been having increased pain in wrist since. abrasions noted to right hand. pt denies numbness, tingling in hanfd. pt calm and cooperative in triage.

## 2025-01-12 NOTE — ED PROVIDER NOTE - ATTENDING APP SHARED VISIT CONTRIBUTION OF CARE
AMANUEL Morrell MD, ED Attending physician:  This was a shared visit with MASHA.  I reviewed and verified the documentation and independently performed the documented history/exam/mdm.

## 2025-01-12 NOTE — ED PROVIDER NOTE - NSFOLLOWUPINSTRUCTIONS_ED_ALL_ED_FT
Take Tylenol as needed for pain.  Follow up with orthopedist if pain does not improve.    Wrist Pain, Adult  There are many things that can cause wrist pain. Some common causes include:  An injury to the wrist area, such as a sprain, strain, or broken bone (fracture).  Overuse of the joint.  A condition that causes increased pressure on a nerve in the wrist (carpal tunnel syndrome).  Wear and tear of the joints that occurs with aging (osteoarthritis).  Other types of joint inflammation and stiffness (arthritis).  Sometimes, the cause of wrist pain is not known. Often, the pain goes away when you follow instructions from your health care provider for relieving pain at home. This may include resting the wrist, icing the wrist, or using a splint or an elastic wrap for a short time. If your wrist pain continues, it is important to tell your provider.    Follow these instructions at home:  If you have a removable splint or elastic wrap:    Wear the splint or wrap as told by your provider. Remove it only as told by your provider. Ask your provider if you may remove it for bathing.  Check the skin around the splint or wrap every day. Tell your provider about any concerns.  Loosen the splint or wrap if your fingers tingle, become numb, or turn cold and blue.  Keep the splint or wrap clean.  If the splint or wrap is not waterproof:  Do not let it get wet.  Cover it with a watertight covering when you take a bath or shower.  Managing pain, stiffness, and swelling    A bag of ice on a towel on the skin.  If told, put ice on the painful area.  If you have a removable splint or wrap, remove it as told by your provider.  Put ice in a plastic bag.  Place a towel between your skin and the bag or between your splint or wrap and the bag.  Leave the ice on for 20 minutes, 2–3 times a day.  If your skin turns bright red, remove the ice right away to prevent skin damage. The risk of damage is higher if you cannot feel pain, heat, or cold.  Move your fingers often to reduce stiffness and swelling.  Raise (elevate) the injured area above the level of your heart while you are sitting or lying down.  Activity    Rest your affected wrist as told by your provider.  Return to your normal activities as told by your provider. Ask your provider what activities are safe for you.  Ask your provider when it is safe to drive if you have a splint or wrap on your wrist.  Do exercises as told by your provider.  General instructions    Pay attention to any changes in your symptoms.  Take over-the-counter and prescription medicines only as told by your provider.  Contact a health care provider if:  You have a sudden, sharp pain in the wrist, hand, or arm that is different or new.  Any swelling or bruising on your wrist or hand gets worse.  Your skin becomes red, has a rash, or has open sores.  Your pain does not get better or it gets worse.  You have a fever or chills.  Get help right away if:  You lose feeling in your fingers or hand.  Your fingers turn white, very red, or cold and blue.  You cannot move your fingers.  This information is not intended to replace advice given to you by your health care provider. Make sure you discuss any questions you have with your health care provider.

## 2025-01-12 NOTE — ED PROVIDER NOTE - CARE PROVIDER_API CALL
Fly Ruggiero.  Orthopaedic Surgery  [address] [place] [address]  Phone: [phone]  Fax: [phone]  Follow Up Time:

## 2025-01-12 NOTE — ED PROCEDURE NOTE - NS ED PERI VASCULAR NEG
No fingers/toes warm to touch/no paresthesia/no swelling/no cyanosis of extremity/capillary refill time < 2 seconds

## 2025-01-12 NOTE — ED PROVIDER NOTE - PATIENT PORTAL LINK FT
You can access the FollowMyHealth Patient Portal offered by [facility] by registering at the following website: [email or web address]. By joining [employer]’s FollowMyHealth portal, you will also be able to view your health information using other applications (apps) compatible with our system.

## 2025-01-12 NOTE — ED PROVIDER NOTE - CLINICAL SUMMARY MEDICAL DECISION MAKING FREE TEXT BOX
33 yo WM, PMH incl: autism, intellectual disability, DM2, impulse control disorder; BIBA to the ED from group home complaining of R wrist pain s/p punching wall 6 times 3 days ago.  Exam: NAD. R Wrist: no deformity, radial pulse normal, + mild soft tissue swelling dorsal aspect extending into R hand dorsum, + TTP distal radius & M/L of wrist w/o discoloration, + good AFROM w/ some pain. B/L hands: healing non-acute abrasions, no signs of infection    Plan: XR R wrist & hand.  Observe, reassess.  Expect DC back to group home w/ wrist velcro splint if XRs negative.    20:21:  XRs negative for acute fx or dislocation. Velco volar splint applied. Stable for dc home with ortho f/u outpatient. Strict return precautions were given. All questions and concerns were addressed. - Shavon Campuzano PA-C

## 2025-01-12 NOTE — ED PROVIDER NOTE - PROGRESS NOTE DETAILS
34-year-old male with past medical history of autism, testicular CA s/p orchiectomy, DM2, impulse control disorder, HLD, BPH, hypothyroid, factitious disorder, GERD, asthma presents to the ED from group home complaining of right wrist pain s/p punching wall 6 times 3 days ago.  Patient states he broke his wrist 2 months ago, saw orthopedist in [place] and had it splinted which was removed patient believes too early.  Denies numbness or tingling in hand/fingers.  Patient also stating several weeks ago he had suicidal thoughts which he does not have at this present time.  No HI, no AVH.  Heart rate 120 bpm, rest of vitals are stable.  Physical exam demonstrates healing abrasions to dorsum of bilateral hands, no signs of infection, mild swelling to right dorsal hand, mild tenderness palpation right distal radius, full range of motion, NVI.  Plan for x-ray, reassess.  Offered pain medication, patient declined at this time. - Shavon Campuzano PA-C XRs negative for acute fx or dislocation. Velco volar splint applied. Stable for dc home with ortho f/u outpatient. Strict return precautions were given. All questions and concerns were addressed. - Shavon Campuzano PA-C

## 2025-01-12 NOTE — ED PROVIDER NOTE - IV ALTEPLASE EXCL ABS HIDDEN
PA #1  Note    Blood Type:   A+          RPR:  NR  Pain:  Controlled  Complaints:  None  Pt. is OOB, voiding, passing flatus, & tolerating regular diet.  Lochia:  WNL    VS:  Vital Signs Last 24 Hrs  T(C): 36.6 (10 Feb 2023 06:32), Max: 37.1 (2023 13:13)  T(F): 97.9 (10 Feb 2023 06:32), Max: 98.8 (2023 17:04)  HR: 62 (10 Feb 2023 06:32) (62 - 135)  BP: 106/70 (10 Feb 2023 06:32) (100/64 - 134/62)  RR: 18 (10 Feb 2023 06:32) (16 - 19)  SpO2: 97% (10 Feb 2023 06:32) (91% - 100%)    Parameters below as of 10 Feb 2023 06:32  Patient On (Oxygen Delivery Method): room air                        11.6   8.31  )-----------( 223      ( 2023 06:19 )             34.3     Abd:  Soft, non-distended, non-tender            Fundus-firm  Laceration/Episiotomy: None  Extremities:  Edema - none                         Calf Tenderness - none    Assessment:    37y y.o. G 6  P 4024      PPD # S/P  in stable condition.  PMHx significant for:  C/S, , Rhinoplasty, D&C  Current Issues:  None  Plan:  Increase OOB             Cont. Pain Protocol             Cont. Reg. Diet             Cont. PP Hosea Sheehan PA-C
show

## 2025-01-12 NOTE — ED ADULT NURSE REASSESSMENT NOTE - NS ED NURSE REASSESS COMMENT FT1
Pt states he has had suicidal thoughts over the last two weeks. Patient denies wanting to harm or kill himself today. Dr. Morrell notified and states he does not need to be on constant observation today. Aid from group home is watching patient.

## 2025-01-12 NOTE — ED ADULT NURSE NOTE - IS THE PATIENT ABLE TO BE SCREENED?
Bill For Surgical Tray: no Information: Selecting Yes will display possible errors in your note based on the variables you have selected. This validation is only offered as a suggestion for you. PLEASE NOTE THAT THE VALIDATION TEXT WILL BE REMOVED WHEN YOU FINALIZE YOUR NOTE. IF YOU WANT TO FAX A PRELIMINARY NOTE YOU WILL NEED TO TOGGLE THIS TO 'NO' IF YOU DO NOT WANT IT IN YOUR FAXED NOTE. Anesthesia Volume In Cc (Will Not Render If 0): 1 Consent: Written consent was obtained and risks were reviewed including but not limited to scarring, infection, bleeding, scabbing, incomplete removal, nerve damage and allergy to anesthesia. Notification Instructions: Patient will be notified of biopsy results. However, patient instructed to call the office if not contacted within 2 weeks. Billing Type: [employer] Anesthesia Type: 1% lidocaine with epinephrine Was A Bandage Applied: Yes Biopsy Type: H and E Epidermal Sutures: 4-0 Nylon Wound Care: Vaseline Suture Removal: 14 days Additional Anesthesia Volume In Cc (Will Not Render If 0): 0 Detail Level: Detailed Post-care instructions reviewed with the patient in detail. The patient is to keep the treated site dry overnight. Remove the bandage and shower as normal with soap and water, dry the area, apply vaseline and a band-aid. Repeat this process daily for 10 days. Should the patient develop any fevers, chills, bleeding, severe pain patient will contact the office immediately. Dressing: pressure dressing Punch Size In Mm: 4 Hemostasis: None Billing Type: Third-Party Bill Home Suture Removal Text: Patient was provided a home suture removal kit and will remove their sutures at home. If they have any questions or difficulties they will call the office. Home Suture Removal Text: Patient was provided a home suture removal kit and will remove their sutures at home.  If they have any questions or difficulties they will call the office. Yes

## 2025-01-12 NOTE — ED PROVIDER NOTE - PHYSICAL EXAMINATION
Gen'l: alert, no respiratory distress, non-toxic, mildly anxious  Head: NC/AT  MSK:  SALAS x 4.  R Wrist: no deformity, radial pulse normal, + mild soft tissue swelling dorsal aspect extending into R hand dorsum, + TTP distal radius & M/L of wrist w/o discoloration, + good AFROM w/ some pain.  R Hand: + mild soft tissue swelling dorsal aspect, no , all digits move well w/o pain nor difficulty, + normal motor/sensory exams & AFROM, normal CR.  Skin: No tactile warmth, no rash. + Healing non-acute abrasions to dorsum of bilateral hands, no signs of infection  Neuro: A+O x 3, CNs grossly intact, normal speech, no focal motor/sensory deficits  Psych: Alert +O x 3, no active SI/HI, no A/V hallucs., + anxious though + cooperative.

## 2025-01-12 NOTE — ED PROVIDER NOTE - OBJECTIVE STATEMENT
Case d/w ED PA.  Pt seen/evaluated in ED.  33 yo WM, PMH incl: autism, intellectual disability, testicular CA s/p orchiectomy, DM2, impulse control disorder, HLD, BPH, hypothyroid, factitious disorder, GERD, asthma; BIBA to the ED from group home complaining of R wrist pain s/p punching wall 6 times 3 days ago.  + Mild aching pain M/L of dorsal R wrist, aggravated by AROM, minimal - absent at rest.  Pt reports was angry/upset day of incident, so punched wall.  Pt unable to explain why he did not present for evaluation of the injury prior to today. Pt denies weakness/numbness/tingling of R digits. Pt reports  R wrist injury ~ 2 mos. ago, claims + fx & also that [place] orthopedist managing it removed cast prematurely.  Pt denies other complaints.  Patient also stating several weeks ago he had some vague suicidal thoughts which he since has not had. No HI, A/V hallucinations.

## 2025-01-13 ENCOUNTER — EMERGENCY (EMERGENCY)
Facility: HOSPITAL | Age: 35
LOS: 1 days | Discharge: HOME | End: 2025-01-13
Attending: EMERGENCY MEDICINE | Admitting: EMERGENCY MEDICINE
Payer: MEDICAID

## 2025-01-13 VITALS
HEIGHT: 69 IN | RESPIRATION RATE: 16 BRPM | HEART RATE: 86 BPM | OXYGEN SATURATION: 99 % | SYSTOLIC BLOOD PRESSURE: 111 MMHG | TEMPERATURE: 100 F | DIASTOLIC BLOOD PRESSURE: 72 MMHG

## 2025-01-13 VITALS
TEMPERATURE: 98 F | DIASTOLIC BLOOD PRESSURE: 61 MMHG | SYSTOLIC BLOOD PRESSURE: 119 MMHG | HEART RATE: 86 BPM | RESPIRATION RATE: 18 BRPM | OXYGEN SATURATION: 94 %

## 2025-01-13 DIAGNOSIS — Z90.79 ACQUIRED ABSENCE OF OTHER GENITAL ORGAN(S): Chronic | ICD-10-CM

## 2025-01-13 LAB
ALBUMIN SERPL ELPH-MCNC: 3.3 G/DL — SIGNIFICANT CHANGE UP (ref 3.3–5)
ALP SERPL-CCNC: 76 U/L — SIGNIFICANT CHANGE UP (ref 40–120)
ALT FLD-CCNC: 37 U/L — SIGNIFICANT CHANGE UP (ref 12–78)
ANION GAP SERPL CALC-SCNC: 7 MMOL/L — SIGNIFICANT CHANGE UP (ref 5–17)
AST SERPL-CCNC: 23 U/L — SIGNIFICANT CHANGE UP (ref 15–37)
BASOPHILS # BLD AUTO: 0.03 K/UL — SIGNIFICANT CHANGE UP (ref 0–0.2)
BASOPHILS NFR BLD AUTO: 0.4 % — SIGNIFICANT CHANGE UP (ref 0–2)
BILIRUB SERPL-MCNC: 0.3 MG/DL — SIGNIFICANT CHANGE UP (ref 0.2–1.2)
BUN SERPL-MCNC: 14 MG/DL — SIGNIFICANT CHANGE UP (ref 7–23)
CALCIUM SERPL-MCNC: 9.4 MG/DL — SIGNIFICANT CHANGE UP (ref 8.5–10.1)
CHLORIDE SERPL-SCNC: 105 MMOL/L — SIGNIFICANT CHANGE UP (ref 96–108)
CO2 SERPL-SCNC: 26 MMOL/L — SIGNIFICANT CHANGE UP (ref 22–31)
CREAT SERPL-MCNC: 0.95 MG/DL — SIGNIFICANT CHANGE UP (ref 0.5–1.3)
D DIMER BLD IA.RAPID-MCNC: <150 NG/ML DDU — SIGNIFICANT CHANGE UP
EGFR: 107 ML/MIN/1.73M2 — SIGNIFICANT CHANGE UP
EOSINOPHIL # BLD AUTO: 0.11 K/UL — SIGNIFICANT CHANGE UP (ref 0–0.5)
EOSINOPHIL NFR BLD AUTO: 1.6 % — SIGNIFICANT CHANGE UP (ref 0–6)
FLUAV AG NPH QL: DETECTED
FLUBV AG NPH QL: SIGNIFICANT CHANGE UP
GLUCOSE SERPL-MCNC: 139 MG/DL — HIGH (ref 70–99)
HCT VFR BLD CALC: 41.1 % — SIGNIFICANT CHANGE UP (ref 39–50)
HGB BLD-MCNC: 13.3 G/DL — SIGNIFICANT CHANGE UP (ref 13–17)
IMM GRANULOCYTES NFR BLD AUTO: 1.3 % — HIGH (ref 0–0.9)
LIDOCAIN IGE QN: 33 U/L — SIGNIFICANT CHANGE UP (ref 13–75)
LYMPHOCYTES # BLD AUTO: 0.97 K/UL — LOW (ref 1–3.3)
LYMPHOCYTES # BLD AUTO: 14.5 % — SIGNIFICANT CHANGE UP (ref 13–44)
MCHC RBC-ENTMCNC: 25 PG — LOW (ref 27–34)
MCHC RBC-ENTMCNC: 32.4 G/DL — SIGNIFICANT CHANGE UP (ref 32–36)
MCV RBC AUTO: 77.3 FL — LOW (ref 80–100)
MONOCYTES # BLD AUTO: 0.85 K/UL — SIGNIFICANT CHANGE UP (ref 0–0.9)
MONOCYTES NFR BLD AUTO: 12.7 % — SIGNIFICANT CHANGE UP (ref 2–14)
NEUTROPHILS # BLD AUTO: 4.65 K/UL — SIGNIFICANT CHANGE UP (ref 1.8–7.4)
NEUTROPHILS NFR BLD AUTO: 69.5 % — SIGNIFICANT CHANGE UP (ref 43–77)
NRBC # BLD: 0 /100 WBCS — SIGNIFICANT CHANGE UP (ref 0–0)
PLATELET # BLD AUTO: 248 K/UL — SIGNIFICANT CHANGE UP (ref 150–400)
POTASSIUM SERPL-MCNC: 4.2 MMOL/L — SIGNIFICANT CHANGE UP (ref 3.5–5.3)
POTASSIUM SERPL-SCNC: 4.2 MMOL/L — SIGNIFICANT CHANGE UP (ref 3.5–5.3)
PROT SERPL-MCNC: 6.9 G/DL — SIGNIFICANT CHANGE UP (ref 6–8.3)
RBC # BLD: 5.32 M/UL — SIGNIFICANT CHANGE UP (ref 4.2–5.8)
RBC # FLD: 15.8 % — HIGH (ref 10.3–14.5)
RSV RNA NPH QL NAA+NON-PROBE: SIGNIFICANT CHANGE UP
SARS-COV-2 RNA SPEC QL NAA+PROBE: SIGNIFICANT CHANGE UP
SODIUM SERPL-SCNC: 138 MMOL/L — SIGNIFICANT CHANGE UP (ref 135–145)
TROPONIN I, HIGH SENSITIVITY RESULT: 14.9 NG/L — SIGNIFICANT CHANGE UP
WBC # BLD: 6.7 K/UL — SIGNIFICANT CHANGE UP (ref 3.8–10.5)
WBC # FLD AUTO: 6.7 K/UL — SIGNIFICANT CHANGE UP (ref 3.8–10.5)

## 2025-01-13 PROCEDURE — 85379 FIBRIN DEGRADATION QUANT: CPT

## 2025-01-13 PROCEDURE — 71045 X-RAY EXAM CHEST 1 VIEW: CPT | Mod: 26

## 2025-01-13 PROCEDURE — 87637 SARSCOV2&INF A&B&RSV AMP PRB: CPT

## 2025-01-13 PROCEDURE — 93010 ELECTROCARDIOGRAM REPORT: CPT

## 2025-01-13 PROCEDURE — 96374 THER/PROPH/DIAG INJ IV PUSH: CPT

## 2025-01-13 PROCEDURE — 83690 ASSAY OF LIPASE: CPT

## 2025-01-13 PROCEDURE — 36415 COLL VENOUS BLD VENIPUNCTURE: CPT

## 2025-01-13 PROCEDURE — 99285 EMERGENCY DEPT VISIT HI MDM: CPT

## 2025-01-13 PROCEDURE — 84484 ASSAY OF TROPONIN QUANT: CPT

## 2025-01-13 PROCEDURE — 93005 ELECTROCARDIOGRAM TRACING: CPT

## 2025-01-13 PROCEDURE — 80053 COMPREHEN METABOLIC PANEL: CPT

## 2025-01-13 PROCEDURE — 85025 COMPLETE CBC W/AUTO DIFF WBC: CPT

## 2025-01-13 PROCEDURE — 99285 EMERGENCY DEPT VISIT HI MDM: CPT | Mod: 25

## 2025-01-13 PROCEDURE — 71045 X-RAY EXAM CHEST 1 VIEW: CPT

## 2025-01-13 RX ORDER — OSELTAMIVIR 75 MG/1
1 CAPSULE ORAL
Qty: 10 | Refills: 0
Start: 2025-01-13 | End: 2025-01-17

## 2025-01-13 RX ORDER — ACETAMINOPHEN 80 MG/.8ML
1000 SOLUTION/ DROPS ORAL ONCE
Refills: 0 | Status: COMPLETED | OUTPATIENT
Start: 2025-01-13 | End: 2025-01-13

## 2025-01-13 RX ORDER — OSELTAMIVIR 75 MG/1
75 CAPSULE ORAL ONCE
Refills: 0 | Status: COMPLETED | OUTPATIENT
Start: 2025-01-13 | End: 2025-01-13

## 2025-01-13 RX ADMIN — ACETAMINOPHEN 400 MILLIGRAM(S): 80 SOLUTION/ DROPS ORAL at 03:22

## 2025-01-13 RX ADMIN — OSELTAMIVIR 75 MILLIGRAM(S): 75 CAPSULE ORAL at 05:45

## 2025-01-13 NOTE — ED ADULT NURSE NOTE - PAIN: BODY LOCATION
"              After Visit Summary   9/27/2018    Rocio Elkins    MRN: [id number]           Patient Information     Date Of Birth          1984        Visit Information        Provider Department      9/27/2018 11:00 AM Jevon Montero APRN CNM [facility]        Today's Diagnoses     Early stage of pregnancy    -  1    Nausea/vomiting in pregnancy           Follow-ups after your visit        Follow-up notes from your care team     Return in about 1 week (around 10/4/2018), or 1st ob appoitment; no u/s needed.      Your next 10 appointments already scheduled     Oct 04, 2018  2:00 PM CDT   New Prenatal with OMAR De Jesus CNM, WE TRIAGE   [facility] ([facility])    [address] [place] [id number] [phone]              Who to contact     If you have questions or need follow up information about today's clinic visit or your schedule please contact [facility] directly at [phone].  Normal or non-critical lab and imaging results will be communicated to you by [employer], letter or phone within 4 business days after the clinic has received the results. If you do not hear from us within 7 days, please contact the clinic through [employer] or phone. If you have a critical or abnormal lab result, we will notify you by phone as soon as possible.  Submit refill requests through [employer] or call your pharmacy and they will forward the refill request to us. Please allow 3 business days for your refill to be completed.          Additional Information About Your Visit        [employer] Information     Formarum lets you send messages to your doctor, view your test results, renew your prescriptions, schedule appointments and more. To sign up, go to [email or web address] . Click on \"Log in\" on the left side of the screen, which will take you to the Welcome page. Then click on \"Sign up Now\" on the " "right side of the page.     You will be asked to enter the access code listed below, as well as some personal information. Please follow the directions to create your username and password.     Your access code is: [email or web address]  Expires: 2018 12:29 PM     Your access code will  in 90 days. If you need help or a new code, please call your [facility] [place] clinic or [phone].        Care EveryWhere ID     This is your Care EveryWhere ID. This could be used by other organizations to access your [facility] medical records  [id number]        Your Vitals Were     Height Last Period Breastfeeding? BMI (Body Mass Index)          5' 7\" (1.702 m) 2018 (Exact Date) No 22.24 kg/m2         Blood Pressure from Last 3 Encounters:   18 118/68   18 104/72   18 100/63    Weight from Last 3 Encounters:   18 142 lb (64.4 kg)   18 141 lb (64 kg)   18 143 lb (64.9 kg)              Today, you had the following     No orders found for display         Today's Medication Changes          These changes are accurate as of 18  3:51 PM.  If you have any questions, ask your nurse or doctor.               Start taking these medicines.        Dose/Directions    ondansetron 4 MG ODT tab   Commonly known as:  ZOFRAN ODT   Used for:  Early stage of pregnancy   Started by:  Jevon Montero APRN CNM        Dose:  4-8 mg   Take 1-2 tablets (4-8 mg) by mouth every 8 hours as needed for nausea   Quantity:  20 tablet   Refills:  1            Where to get your medicines      These medications were sent to [facility], [place] - [address]  [address], [address], [facility] [address]     Phone:  [phone]     ondansetron 4 MG ODT tab                Primary Care Provider Fax #    Physician No Ref-Primary [phone]       No address on file        Equal Access to Services     MORELIA [place] AH: Barrett Levine, may krause, ybta " lobito hancockmiguel hernández ah. So [facility] [phone].    ATENCIÓN: Si ariana bailey, tiene a de dios disposición servicios gratuitos de asistencia lingüística. [place] al [phone].    We comply with applicable federal civil rights laws and [place] laws. We do not discriminate on the basis of race, color, national origin, age, disability, sex, sexual orientation, or gender identity.            Thank you!     Thank you for choosing [facility]  for your care. Our goal is always to provide you with excellent care. Hearing back from our patients is one way we can continue to improve our services. Please take a few minutes to complete the written survey that you may receive in the mail after your visit with us. Thank you!             Your Updated Medication List - Protect others around you: Learn how to safely use, store and throw away your medicines at www.disposemymeds.org.          This list is accurate as of 9/27/18  3:51 PM.  Always use your most recent med list.                   Brand Name Dispense Instructions for use Diagnosis    * cholecalciferol 1000 UNIT tablet    vitamin D3     Take 1,000 Units by mouth        * cholecalciferol 1000 UNIT tablet    vitamin D3     TK 1 T PO D        ciprofloxacin 500 MG tablet    CIPRO    10 tablet    Take 1 tablet (500 mg) by mouth 2 times daily    Dysuria, Urinary tract infection with hematuria, site unspecified       ibuprofen 600 MG tablet    ADVIL/MOTRIN          levothyroxine 100 MCG tablet    SYNTHROID/LEVOTHROID     TK 1 T PO D        ondansetron 4 MG ODT tab    ZOFRAN ODT    20 tablet    Take 1-2 tablets (4-8 mg) by mouth every 8 hours as needed for nausea    Early stage of pregnancy       phenazopyridine 97.5 MG tablet    AZO    12 tablet    Take 2 tablets (195 mg) by mouth 3 times daily    Dysuria, Urinary tract infection with hematuria, site unspecified       PREPLUS 27-1 MG Tabs      TK 1 T PO DAILY        *  Notice:  This list has 2 medication(s) that are the same as other medications prescribed for you. Read the directions carefully, and ask your doctor or other care provider to review them with you.       chest, general

## 2025-01-13 NOTE — ED ADULT NURSE NOTE - NSFALLUNIVINTERV_ED_ALL_ED
Bed/Stretcher in lowest position, wheels locked, appropriate side rails in place/Call bell, personal items and telephone in reach/Instruct patient to call for assistance before getting out of bed/chair/stretcher/Non-slip footwear applied when patient is off stretcher/[place] to call system/Physically safe environment - no spills, clutter or unnecessary equipment/Purposeful proactive rounding/Room/bathroom lighting operational, light cord in reach

## 2025-01-13 NOTE — ED PROVIDER NOTE - PATIENT PORTAL LINK FT
You can access the FollowMyHealth Patient Portal offered by [facility] by registering at the following website: [email or web address]. By joining [employer]’s FollowMyHealth portal, you will also be able to view your health information using other applications (apps) compatible with our system.

## 2025-01-13 NOTE — ED PROVIDER NOTE - CLINICAL SUMMARY MEDICAL DECISION MAKING FREE TEXT BOX
34-year-old male presenting with tactile fever, chills body aches.  Mild cough.  Lungs are clear to auscultation.  No respiratory distress.  Abdomen is nontender.  Will evaluate for flu COVID, pneumonia.  Will give Tylenol, give IV fluids, reassess.

## 2025-01-13 NOTE — ED ADULT TRIAGE NOTE - CHIEF COMPLAINT QUOTE
Came in c/o fever and chest started an hour ago. Patient was given motrin 30 minutes ago. Denies nausea/ vomiting/ SOB. Came in c/o fever and chest pain started an hour ago. Patient was given motrin 30 minutes ago. Denies nausea/ vomiting/ SOB.

## 2025-01-13 NOTE — ED PROVIDER NOTE - PROGRESS NOTE DETAILS
Labs reviewed, positive for influenza A.  Chest x-ray negative for pneumonia.  Patient is cleared for discharge.  Tamiflu ordered.  Patient informed of results.

## 2025-01-13 NOTE — ED ADULT NURSE NOTE - OBJECTIVE STATEMENT
received pt. from Triage w/ c/o fever and chest pain started an hour ago PTA, afebrile, HR = 114, placed on monitor, safety maintained and applied.

## 2025-01-13 NOTE — ED PROVIDER NOTE - OBJECTIVE STATEMENT
34-year-old male from group home presents complaining of fever, mild cough that started tonight.  Patient was seen in [place] earlier in the evening for hand pain after punching a wall.  States he got back to his group home and was getting ready to go to sleep but 12:00 is 8 stated patient is reporting fever felt warm and was given ibuprofen at 12:30 AM.  Denies abdominal abdominal pain nausea vomiting shortness of breath chest pain.

## 2025-01-13 NOTE — ED ADULT NURSE NOTE - CHIEF COMPLAINT QUOTE
Came in c/o fever and chest pain started an hour ago. Patient was given motrin 30 minutes ago. Denies nausea/ vomiting/ SOB.

## 2025-01-13 NOTE — ED ADULT TRIAGE NOTE - LOCATION:
See lab note. 1. Urinary tract infection without hematuria, site unspecified    - nitrofurantoin monohydrate macro 100 MG Oral Cap; Take 1 capsule (100 mg total) by mouth 2 (two) times daily for 10 days. Dispense: 20 capsule;  Refill: 0
Left arm;

## 2025-01-16 NOTE — BH INPATIENT PSYCHIATRY ASSESSMENT NOTE - NS ED BHA MED ROS PSYCHIATRIC
Consent: The patient's consent was obtained including but not limited to risks of crusting, scabbing, blistering, scarring, darker or lighter pigmentary change, recurrence, incomplete removal and infection. Show Aperture Variable?: Yes Aperture Size (Optional): C Number Of Freeze-Thaw Cycles: 1 freeze-thaw cycle Render Post-Care Instructions In Note?: no Detail Level: Detailed Duration Of Freeze Thaw-Cycle (Seconds): 5 Post-Care Instructions: I reviewed with the patient in detail post-care instructions. Patient is to wear sunprotection, and avoid picking at any of the treated lesions. Pt may apply Vaseline to crusted or scabbing areas. Application Tool (Optional): Cry-AC See HPI

## 2025-01-17 NOTE — ED BEHAVIORAL HEALTH ASSESSMENT NOTE - RECENT MEMORY
Pt arrives via EMS from [facility]. EMS states pt was having a panic attack this morning and feeling dizzy and HA this morning. Pt states she thinks she had a seizure. Pt endorses LOC.  Hx bipolar, seizures.  
Other

## 2025-01-22 NOTE — ED ADULT TRIAGE NOTE - MEANS OF ARRIVAL
Enma was here today for Medical weight management appointment  She reports new diagnosis of breast Cancer, and has appointment with Hematology at 11:30  She is not sure what she will need as far as breast cancer treatment yet  Explained that if we do the apointment, she will not be able to get to her hematology visit on time.  Offered to have her Copay refunded for medical weight management and she can reschedule in the future when she is settle with her diagnosis/treatment for breast cancer.  Patient agreeable to cancel her Medical weight management appointment  Will request copay to be refunded  Please call patient and offer to reschedule with RD if she is interested   ambulatory

## 2025-01-22 NOTE — BH PATIENT PROFILE - NSGHSEXHXINAPP_PSY_ALL_CORE
Patient has persistent (7 days or more) atrial fibrillation. Patient is currently in atrial fibrillation. [id number] Score: 3. The patients heart rate in the last 24 hours is as follows:  Pulse  Min: 75  Max: 92     Antiarrhythmics  amiodarone tablet 200 mg, Daily, Oral  metoprolol succinate (TOPROL-XL) 24 hr tablet 50 mg, 2 times daily, Oral    Anticoagulants  apixaban tablet 5 mg, 2 times daily, Oral    Plan  - Replete lytes with a goal of K>4, Mg >2  - Patient is anticoagulated, see medications listed above.  - Patient's afib is currently controlled   None

## 2025-01-27 NOTE — ED ADULT TRIAGE NOTE - SPO2 (%)
Take viagra 50mg as needed 1 hour prior to sexual intercourse on an empty stomach (2 hours or more after eating).   2. Common side effects include headache, nasal congestion, and upset stomach.  
100

## 2025-01-29 NOTE — ED ADULT TRIAGE NOTE - RESPIRATORY RATE (BREATHS/MIN)
Current patient location: [address]  [facility] [address]    Is the patient currently in the state of [place]? YES    Visit mode: VIDEO    If the visit is dropped, the patient can be reconnected by:VIDEO VISIT: Send to e-mail at: [email or web address]    Will anyone else be joining the visit? NO  (If patient encounters technical issues they should call [phone]    Are changes needed to the allergy or medication list? No    Are refills needed on medications prescribed by this physician? NO    Rooming Documentation:  Questionnaire(s) not done per department protocol    Reason for visit: RECHECK Shelby Kocher VVF       14

## 2025-02-01 ENCOUNTER — EMERGENCY (EMERGENCY)
Facility: HOSPITAL | Age: 35
LOS: 1 days | Discharge: HOME | End: 2025-02-01
Attending: EMERGENCY MEDICINE | Admitting: EMERGENCY MEDICINE
Payer: MEDICAID

## 2025-02-01 VITALS
TEMPERATURE: 99 F | RESPIRATION RATE: 18 BRPM | DIASTOLIC BLOOD PRESSURE: 89 MMHG | WEIGHT: 283.07 LBS | SYSTOLIC BLOOD PRESSURE: 118 MMHG | OXYGEN SATURATION: 97 % | HEIGHT: 69 IN | HEART RATE: 92 BPM

## 2025-02-01 DIAGNOSIS — Z90.79 ACQUIRED ABSENCE OF OTHER GENITAL ORGAN(S): Chronic | ICD-10-CM

## 2025-02-01 LAB
ALBUMIN SERPL ELPH-MCNC: 3.5 G/DL — SIGNIFICANT CHANGE UP (ref 3.3–5)
ALP SERPL-CCNC: 70 U/L — SIGNIFICANT CHANGE UP (ref 40–120)
ALT FLD-CCNC: 85 U/L — HIGH (ref 12–78)
ANION GAP SERPL CALC-SCNC: 10 MMOL/L — SIGNIFICANT CHANGE UP (ref 5–17)
APAP SERPL-MCNC: <2 UG/ML — SIGNIFICANT CHANGE UP (ref 10–30)
APPEARANCE UR: CLEAR — SIGNIFICANT CHANGE UP
AST SERPL-CCNC: 45 U/L — HIGH (ref 15–37)
BASOPHILS # BLD AUTO: 0.05 K/UL — SIGNIFICANT CHANGE UP (ref 0–0.2)
BASOPHILS NFR BLD AUTO: 0.6 % — SIGNIFICANT CHANGE UP (ref 0–2)
BILIRUB SERPL-MCNC: 0.3 MG/DL — SIGNIFICANT CHANGE UP (ref 0.2–1.2)
BILIRUB UR-MCNC: NEGATIVE — SIGNIFICANT CHANGE UP
BUN SERPL-MCNC: 12 MG/DL — SIGNIFICANT CHANGE UP (ref 7–23)
CALCIUM SERPL-MCNC: 9.4 MG/DL — SIGNIFICANT CHANGE UP (ref 8.5–10.1)
CHLORIDE SERPL-SCNC: 103 MMOL/L — SIGNIFICANT CHANGE UP (ref 96–108)
CO2 SERPL-SCNC: 24 MMOL/L — SIGNIFICANT CHANGE UP (ref 22–31)
COLOR SPEC: YELLOW — SIGNIFICANT CHANGE UP
CREAT SERPL-MCNC: 1.1 MG/DL — SIGNIFICANT CHANGE UP (ref 0.5–1.3)
DIFF PNL FLD: NEGATIVE — SIGNIFICANT CHANGE UP
EGFR: 90 ML/MIN/1.73M2 — SIGNIFICANT CHANGE UP
EOSINOPHIL # BLD AUTO: 0.2 K/UL — SIGNIFICANT CHANGE UP (ref 0–0.5)
EOSINOPHIL NFR BLD AUTO: 2.5 % — SIGNIFICANT CHANGE UP (ref 0–6)
GLUCOSE SERPL-MCNC: 170 MG/DL — HIGH (ref 70–99)
GLUCOSE UR QL: NEGATIVE MG/DL — SIGNIFICANT CHANGE UP
HCT VFR BLD CALC: 41.8 % — SIGNIFICANT CHANGE UP (ref 39–50)
HGB BLD-MCNC: 13.6 G/DL — SIGNIFICANT CHANGE UP (ref 13–17)
IMM GRANULOCYTES NFR BLD AUTO: 0.6 % — SIGNIFICANT CHANGE UP (ref 0–0.9)
KETONES UR-MCNC: ABNORMAL MG/DL
LEUKOCYTE ESTERASE UR-ACNC: NEGATIVE — SIGNIFICANT CHANGE UP
LYMPHOCYTES # BLD AUTO: 3.26 K/UL — SIGNIFICANT CHANGE UP (ref 1–3.3)
LYMPHOCYTES # BLD AUTO: 40.4 % — SIGNIFICANT CHANGE UP (ref 13–44)
MCHC RBC-ENTMCNC: 25.1 PG — LOW (ref 27–34)
MCHC RBC-ENTMCNC: 32.5 G/DL — SIGNIFICANT CHANGE UP (ref 32–36)
MCV RBC AUTO: 77.3 FL — LOW (ref 80–100)
MONOCYTES # BLD AUTO: 0.7 K/UL — SIGNIFICANT CHANGE UP (ref 0–0.9)
MONOCYTES NFR BLD AUTO: 8.7 % — SIGNIFICANT CHANGE UP (ref 2–14)
NEUTROPHILS # BLD AUTO: 3.8 K/UL — SIGNIFICANT CHANGE UP (ref 1.8–7.4)
NEUTROPHILS NFR BLD AUTO: 47.2 % — SIGNIFICANT CHANGE UP (ref 43–77)
NITRITE UR-MCNC: NEGATIVE — SIGNIFICANT CHANGE UP
NRBC # BLD: 0 /100 WBCS — SIGNIFICANT CHANGE UP (ref 0–0)
NRBC BLD-RTO: 0 /100 WBCS — SIGNIFICANT CHANGE UP (ref 0–0)
PH UR: 5.5 — SIGNIFICANT CHANGE UP (ref 5–8)
PLATELET # BLD AUTO: 224 K/UL — SIGNIFICANT CHANGE UP (ref 150–400)
POTASSIUM SERPL-MCNC: 3.9 MMOL/L — SIGNIFICANT CHANGE UP (ref 3.5–5.3)
POTASSIUM SERPL-SCNC: 3.9 MMOL/L — SIGNIFICANT CHANGE UP (ref 3.5–5.3)
PROT SERPL-MCNC: 7 G/DL — SIGNIFICANT CHANGE UP (ref 6–8.3)
PROT UR-MCNC: NEGATIVE MG/DL — SIGNIFICANT CHANGE UP
RBC # BLD: 5.41 M/UL — SIGNIFICANT CHANGE UP (ref 4.2–5.8)
RBC # FLD: 16.9 % — HIGH (ref 10.3–14.5)
SALICYLATES SERPL-MCNC: <1.7 MG/DL — SIGNIFICANT CHANGE UP (ref 2.8–20)
SODIUM SERPL-SCNC: 137 MMOL/L — SIGNIFICANT CHANGE UP (ref 135–145)
SP GR SPEC: 1.01 — SIGNIFICANT CHANGE UP (ref 1–1.03)
UROBILINOGEN FLD QL: 1 MG/DL — SIGNIFICANT CHANGE UP (ref 0.2–1)
WBC # BLD: 8.06 K/UL — SIGNIFICANT CHANGE UP (ref 3.8–10.5)
WBC # FLD AUTO: 8.06 K/UL — SIGNIFICANT CHANGE UP (ref 3.8–10.5)

## 2025-02-01 PROCEDURE — 99285 EMERGENCY DEPT VISIT HI MDM: CPT

## 2025-02-01 NOTE — ED PROVIDER NOTE - PATIENT PORTAL LINK FT
You can access the FollowMyHealth Patient Portal offered by [facility] by registering at the following website: [email or web address]. By joining [employer]’s FollowMyHealth portal, you will also be able to view your health information using other applications (apps) compatible with our system.

## 2025-02-01 NOTE — ED PROVIDER NOTE - NSFOLLOWUPINSTRUCTIONS_ED_ALL_ED_FT
Return to the ED for any new or worsening symptoms  Take your medication as prescribed  Follow-up with your psychiatrist call tomorrow Monday to schedule follow-up  Advance activity as tolerated        Impulse Control Disorders  Impulse control disorders are a group of mental health disorders in which a person cannot control a sudden urge to do something (impulse). People who cannot control impulses act again and again without planning or thinking about consequences. A person with an impulse control disorder may:  Have outbursts of anger and argue with people in authority.  Hurt others through actions or words (be physically or verbally aggressive).  Routinely break rules or laws. This may include harming people, animals, or property.  Steal, start fires, quiñonez, or do other risky behaviors.  Impulse control disorders typically start in the late teenage or early adult years. People with impulse control disorders often have emotional disorders or other forms of mental illness. These may include drug use or other addiction problems. Over time, impulse disorders may cause problems in relationships and may lead to legal problems.    What are the causes?  The cause of these conditions is not known.    What increases the risk?  People are more likely to develop these conditions if they:  Experience abuse or neglect during childhood.  Experience physical or emotional trauma during childhood.  Have a parent or sibling with an impulse control disorder.  Have another mental health condition, such as attention-deficit/hyperactivity disorder (ADHD) or a substance use disorder.  What are the signs or symptoms?  Unlike people with other health or substance use conditions, people with impulse control disorders cannot keep from acting on their urges. They may:  Have trouble controlling emotions, especially anger.  Feel like they must act on an impulse when they feel strong emotions or stress.  Have specific impulsive behaviors that relieve tension, such as setting a fire or stealing.  Feel anxious, tense, or excited before or during the impulsive behavior.  Feel relief or pleasure while acting on the impulse, or after acting on it.  Act without thinking of the safety of themselves or others, and act without planning ahead.  Feel regret or guilt after acting on an impulse.  Symptoms of impulse control disorders differ based on the specific disorder.    How is this diagnosed?  A person talking with a health care provider.  These disorders are diagnosed through an assessment by your health care provider. Your health care provider will ask about:  Your impulsive behavior.  Your moods.  Your thoughts.  Past and recent life events.  Your medical history.  Your use of alcohol, drugs, or prescription medicines.  Certain medical conditions, other mental illnesses, and certain substances can cause symptoms like the symptoms of impulse control disorders. You may be referred to a mental health specialist.    How is this treated?  Impulse control disorders may be treated with one or more of these treatments:  Cognitive behavioral therapy (CBT). This is a form of talk therapy. It focuses on reducing negative beliefs and thoughts related to impulsive behavior and replacing them with healthier thoughts and behaviors. This may be done one-on-one or in a group setting.  Medicines.  Family intervention. This is a program that teaches family members about your disorder. It teaches healthy communication and problem-solving skills, and it helps family members support you.  Follow these instructions at home:  A group of people taking part in a therapy session.  Take over-the-counter and prescription medicines only as told by your health care provider. Check with your health care provider before starting any new prescription or over-the-counter medicines.  Find a support group to talk with peers about dealing with stress and impulses.  Find healthy ways to recognize emotions and manage stress, such as:  Keeping a journal.  Exercise.  Deep breathing, yoga, or meditation.  Keep all follow-up visits. This is important. This includes going to therapy or family intervention as told.  Contact a health care provider if:  You cannot take your medicines as prescribed.  Your symptoms get worse.  Get help right away if:  You think about hurting yourself or others.  Get help right away if you feel like you may hurt yourself or others, or have thoughts about taking your own life. Go to your nearest emergency room or:  Call 911.  Call the National Suicide Prevention Lifeline at 1-380.705.5836 or 571. This is open 24 hours a day.  Text the Crisis Text Line at 932213.  Summary  Impulse control disorders are a group of mental health disorders in which a person cannot control a sudden urge to do something (impulse).  People with these disorders behave certain ways on impulse so they can feel relief from stress or emotional tension.  Treatment may include cognitive behavioral therapy (CBT), medicines, or family intervention. One or more treatments may be used.  This information is not intended to replace advice given to you by your health care provider. Make sure you discuss any questions you have with your health care provider.

## 2025-02-01 NOTE — ED ADULT NURSE NOTE - OBJECTIVE STATEMENT
Noted laceration to R lower abd, pt states he used plastic to cut himself there.  Area slightly red, healing.  Noted laceration to R wrist.  Pt states he used a coat  to do this.  Area slightly reddened, healing.  Pt relates feeling like he wants to cut himself again. Noted laceration to R lower abd pt states he used plastic to cut himself there.  Area slightly red, healing, cleaned with saline, covered with gauze and tape.  Noted laceration to R wrist.  Pt states he used a coat  to do this.  Area slightly reddened, healing.  Pt relates feeling like he wants to cut himself again. when asked pt states he was hearing voices that told him to cut himself. Pt states he cut himself purposefully. PT states he has been hearing voices all week. Denies thoughts of hurting some one else.

## 2025-02-01 NOTE — ED PROVIDER NOTE - CLINICAL SUMMARY MEDICAL DECISION MAKING FREE TEXT BOX
Results of labs reviewed patient has a normal white count CMP noted urine does not appear to be infected.  Patient was seen and cleared by telepsychiatry stable for discharge Patient is a 34-year-old male who presents to the emergency room with chief complaint of impulse control issues.  Past medical history of asthma autism GERD BPH hyperlipidemia hypothyroidism impulse control disorder intellectual disability testicular cancer and diabetes.  Patient is presenting from group home with reported suicidal ideation.  Per patient and staff he attempted to cut his wrist and abdomen.  Patient states he cut his abdomen yesterday was taken to [facility] did have a psychiatric evaluation was cleared and discharged he then proceeded to cut his right forearm with a  today and was brought back to the ER for psychiatric evaluation.  He has had previous inpatient admissions for similar issues.  Does follow with an outpatient psychiatrist.  Denies any chest pain shortness of breath or abdominal pain.  On exam patient is sitting up, cooperative no acute distress normocephalic atraumatic pupils equal round and reactive hearts regular rate lungs clear to auscultation abdomen soft nontender nondistended.  Patient has a healing superficial abrasion noted to the abdomen from the day prior and a healing abrasion noted to the right anterior wrist no active bleeding no overlying cellulitis. Patient presenting to the emergency room with reported self-harm.  Will obtain screening labs consult with telepsychiatry monitor.  Ultimate clinical disposition will be pending results.  Independent review of EKG reveals a normal sinus rhythm with a sinus arrhythmia at a rate of 75 bpm. Results of labs reviewed patient has a normal white count CMP noted urine does not appear to be infected.  Patient was seen and cleared by telepsychiatry stable for discharge

## 2025-02-01 NOTE — ED PROVIDER NOTE - OBJECTIVE STATEMENT
34-year-old male with history of asthma, autism, GERD, BPH, hyperlipidemia, hypothyroidism, impulse control, intellectual disability, testicular cancer, diabetes presents to the ED from group home for suicidal ideation. Patient attempted to cut his wrist and his abdomen.  Patient states he cut his abdomen yesterday, was taken to [facility], had a psychiatric evaluation, was cleared and then discharged back to group home.  Patient then cut his right forearm with a  today and was brought to ED for psychiatric evaluation.  Patient with previous psychiatric admission for similar symptoms. Patient takes BuSpar, Depakote, Remeron, risperidone fluphenazine injection.  Patients  psychiatrist Dr. Duffy [phone]

## 2025-02-01 NOTE — ED PROVIDER NOTE - NSFOLLOWUPCLINICS_GEN_ALL_ED_FT
[facility]  Psychiatry  [address] [place] [address]  Phone: [phone]  Fax:

## 2025-02-01 NOTE — ED PROVIDER NOTE - DIFFERENTIAL DIAGNOSIS
Patient presenting to the emergency room with reported self-harm.  Will obtain screening labs consult with telepsychiatry monitor.  Ultimate clinical disposition will be pending results. Differential Diagnosis

## 2025-02-01 NOTE — ED ADULT NURSE NOTE - NSFALLUNIVINTERV_ED_ALL_ED
Bed/Stretcher in lowest position, wheels locked, appropriate side rails in place/Call bell, personal items and telephone in reach/Instruct patient to call for assistance before getting out of bed/chair/stretcher/Non-slip footwear applied when patient is off stretcher/[place] to call system/Physically safe environment - no spills, clutter or unnecessary equipment/Purposeful proactive rounding/Room/bathroom lighting operational, light cord in reach

## 2025-02-01 NOTE — ED ADULT TRIAGE NOTE - CHIEF COMPLAINT QUOTE
pt was brought into ED from group home by EMS for C/O of worsening wounds/laceration to right wrist and abdomen. both injuries were self inflicted, pt endorses feeling of SI. denies HI, denies auditory and visual hallucination. was seen at [facility] yesterday.

## 2025-02-01 NOTE — ED ADULT NURSE NOTE - CHIEF COMPLAINT QUOTE
pt was brought into ED from group home by EMS for C/O of worsening wounds/laceration to right wrist and abdomen. both injuries were self inflicted, pt endorses feeling of SI. denies HI, denies auditory and visual hallucination. was seen at [facility] yesterday.

## 2025-02-02 LAB — SARS-COV-2 RNA SPEC QL NAA+PROBE: SIGNIFICANT CHANGE UP

## 2025-02-02 PROCEDURE — 81003 URINALYSIS AUTO W/O SCOPE: CPT

## 2025-02-02 PROCEDURE — 90792 PSYCH DIAG EVAL W/MED SRVCS: CPT | Mod: 95

## 2025-02-02 PROCEDURE — 80307 DRUG TEST PRSMV CHEM ANLYZR: CPT

## 2025-02-02 PROCEDURE — 99285 EMERGENCY DEPT VISIT HI MDM: CPT | Mod: 25

## 2025-02-02 PROCEDURE — 80053 COMPREHEN METABOLIC PANEL: CPT

## 2025-02-02 PROCEDURE — 87635 SARS-COV-2 COVID-19 AMP PRB: CPT

## 2025-02-02 PROCEDURE — 93005 ELECTROCARDIOGRAM TRACING: CPT

## 2025-02-02 PROCEDURE — 93010 ELECTROCARDIOGRAM REPORT: CPT

## 2025-02-02 PROCEDURE — 36415 COLL VENOUS BLD VENIPUNCTURE: CPT

## 2025-02-02 PROCEDURE — 85025 COMPLETE CBC W/AUTO DIFF WBC: CPT

## 2025-02-02 NOTE — ED BEHAVIORAL HEALTH ASSESSMENT NOTE - ADDITIONAL DETAILS ALL
describes suicidal thoughts contingent only on discharge; hx cutting self with blunt objects (e.g. credit card, spoons), reportedly also stabbed himself with spoons at [facility]

## 2025-02-02 NOTE — ED BEHAVIORAL HEALTH ASSESSMENT NOTE - RISK ASSESSMENT
Patient is at moderate acute suicide risk, currently denies SI, plan or intent. Does have hx of impulsive behavior and stabbed his abdominal wound with plastic utensils while at [facility]. Protective factors include relationship with family (mother & sister). Risk factors include hx of ASD, impulse control disorder, hx of trauma (PTSD). He denies having access to lethal methods. Patient is at elevated chronic suicide risk due to hx of SIB and impulsivity. Patient is at moderate risk for potential violence, has hx of aggression towards property and others, states that he was arrested twice. currently denies SI, plan or intent. Does have hx of impulsive behavior and stabbed his abdominal wound with plastic utensils while at [facility]. Protective factors include relationship with family (mother & sister). Risk factors include hx of ASD, impulse control disorder, hx of trauma (PTSD). He denies having access to lethal methods. Patient is at elevated chronic suicide risk due to hx of SIB and impulsivity. Patient is at moderate risk for potential violence, has hx of aggression towards property and others, states that he was arrested twice.

## 2025-02-02 NOTE — ED BEHAVIORAL HEALTH ASSESSMENT NOTE - DETAILS
Zyprexa - rash bullying hx aggression towards staff pt requested to be brought to ED pt reports chronic hx fluctuating suicidal thoughts patient advised to return to ED or call 911 if symptoms worsen or thoughts to harm self or others occur.

## 2025-02-02 NOTE — ED BEHAVIORAL HEALTH ASSESSMENT NOTE - DESCRIPTION
Pt in good behavioral control in ED asthma, DM, BPH, urinary retention, hx testicular cancer s/p orchidectomy, GERD, hypothyroidism, HTN, HLD, myopia domiciled at [facility] since 9/2024, previously lived in group home in [place], pt reports being transferred d/t bullying

## 2025-02-02 NOTE — ED BEHAVIORAL HEALTH ASSESSMENT NOTE - OTHER PAST PSYCHIATRIC HISTORY (INCLUDE DETAILS REGARDING ONSET, COURSE OF ILLNESS, INPATIENT/OUTPATIENT TREATMENT)
multiple past inpatient psychiatric admissions (as per [facility] staff, most recently 4/2024, hx of cutting himself, charted longstanding hx of endorsing SI, no known prior suicide attempts or significant violence; in outpatient treatment at [place]

## 2025-02-02 NOTE — ED BEHAVIORAL HEALTH ASSESSMENT NOTE - OTHER
limited discussed indications to return to ED chronically impaired by hx staff member at [facility] peer  at [facility]

## 2025-02-02 NOTE — ED BEHAVIORAL HEALTH ASSESSMENT NOTE - SAFETY PLAN ADDT'L DETAILS
Education provided regarding environmental safety / lethal means restriction/Provision of National Suicide Prevention Lifeline 4-061-385-TALK (9868)

## 2025-02-02 NOTE — ED BEHAVIORAL HEALTH ASSESSMENT NOTE - VIOLENCE RISK FACTORS:
Feeling of being under threat and being unable to control threat/Impulsivity/History of being victimized/traumatized/Irritability Feeling of being under threat and being unable to control threat/Impulsivity/History of being victimized/traumatized

## 2025-02-02 NOTE — ED BEHAVIORAL HEALTH ASSESSMENT NOTE - HPI (INCLUDE ILLNESS QUALITY, SEVERITY, DURATION, TIMING, CONTEXT, MODIFYING FACTORS, ASSOCIATED SIGNS AND SYMPTOMS)
34 year old male, single, domiciled at [facility] (with 1:1), employed as assistant reader at [employer], with PMH asthma, DM, BPH, urinary retention, hx testicular cancer s/p orchidectomy, GERD, hypothyroidism, HTN, HLD, myopia, PPH autism spectrum disorder, moderate intellectual disability; other diagnoses includes bipolar disorder, impulse control disorder, HAVEN, mood disorder NOS, PTSD, ADHD and factitious disorder. with multiple past inpatient psychiatric admissions (as per [facility] staff, most recently 4/2024, hx of cutting himself, charted longstanding hx of endorsing SI (situational: mostly stemming from discontent of living environment) as well as hx of aggression toward group home staff; BIBEMS a/b patient reporting suicidal ideation, recent self harm with superficial abrasions to stomach and forearm.     On eval, pt presents with   but euthymic affect, concrete thought process, preoccupied with admission.   t cut himself on his arm and stomach because he wasn't feeling happy.     He denies any particular trigger, but states that he 'wants to be admitted to inpatient psych'.     He reports hx suicidal thoughts but denies intent or plan to harm self, describes these thoughts as ego-dystonic. He reports difficulty sleeping at home but feels tired in the ER. Denies any substance use and reports ongoing follow-up with psychiatric appointments as arranged by [employer] [facility] [employer],   Psychoeducation provided, discussed stressors, safety plan, and indications to return to ED. Pt reports feeling better while in the ED, agrees with plan to return back to [facility] with staff member.     Collateral:  KAYLIE (, [facility], [phone]): States that pt is well known to [facility], frequently calls EMS to be brought to the hospital. Had also presented at [place] [facility]   and discharged a . States that pt has a hx of cutting himself with blunt objects, aware of current abrasions. Pt is on constant observation and has no access to means of harm. Agrees with plan to discharge back to [facility]. 34 year old male, single, domiciled at [facility] (with 1:1), employed as assistant reader at [employer], with PMH asthma, DM, BPH, urinary retention, hx testicular cancer s/p orchidectomy, GERD, hypothyroidism, HTN, HLD, myopia, PPH autism spectrum disorder, moderate intellectual disability; other diagnoses includes bipolar disorder, impulse control disorder, HAVEN, mood disorder NOS, PTSD, ADHD and factitious disorder, with multiple past inpatient psychiatric admissions (as per [facility] [employer] [facility] staff, most recently 4/2024, hx of cutting himself, charted longstanding hx of endorsing SI (situational: mostly stemming from discontent of living environment) as well as hx of aggression toward [employer] staff; BIBEMS self activated for SI and self harm to stomach and forearm.     On eval, pt presents with euthymic affect, restless, concrete thought process, and preoccupied with admission. Pt reports he has felt suicidal for the past 2 months and last night, cut himself on his arm and stomach with a hangar and piece of plastic. He denies any specific trigger last night, but he and his ex fiance broke up 2 months ago. Since then, he's been feeling depressed with difficulty sleeping and eating. He repeatedly requests inpatient hospitalization to fix his meds. He describes chronic SI that is ego-dystonic, no current intent or plan. He reports he's been "hurting himself for 6 months, cutting myself everyday for the past 6 months so I can get admitted". Pt states he calls 911 himself almost daily to come to the hospital but they always clear him. Pt says he hurts himself to feel better and sometimes also bangs his head on the wall. He denies any recent med changes and feels better when his meds are decreased. Pt describes voices "from his head", starting 2 months ago. He sees his therapist monthly and psychiatrist every couple months.     Collateral from garfield Gregory at bedside:   Staff states he usually works at night, when pt is sleeping, but has some familiarity with him. He states pt has a history of low frustration tolerance, expressing SI and self harm, and this usually happens when pt feels more isolated. Staff states pt appeared to be smiling today, interacting with staff, and seeking attention more from staff. He states he has observed patient during depressive episodes or in acute distress, and today pt's demeanor does not appear similar to those times. Staff try to keep him busy and preoccupied with activities and company, however sometimes when he is left more alone or bored can act out like this. Staff confirms pt is supposed to be on constant observation.

## 2025-02-02 NOTE — ED BEHAVIORAL HEALTH ASSESSMENT NOTE - SUMMARY
34 year old male, single, domiciled at [facility] (with 1:1), employed as assistant reader at [employer], with PMH asthma, DM, BPH, urinary retention, hx testicular cancer s/p orchidectomy, GERD, hypothyroidism, HTN, HLD, myopia, PPH autism spectrum disorder, moderate intellectual disability; other diagnoses includes bipolar disorder, impulse control disorder, HAVEN, mood disorder NOS, PTSD, ADHD and factitious disorder. with multiple past inpatient psychiatric admissions (as per [employer] staff, most recently 4/2024, hx of cutting himself, charted longstanding hx of endorsing SI (situational: mostly stemming from discontent of living environment) as well as hx of aggression toward group home staff; BIBEMS a/b patient reporting suicidal ideation, recent self harm with superficial abrasions to stomach and forearm.     Current presentation similar to multiple prior, with patient requesting inpatient psychiatric admission, struglging with poor impulse control, frequent ED presentations (presented to another ED earlier today) for various medical and psychiatric complaints. Psychiatric residence denies any safety concerns, ongoing medication adherence, patient is additionally on 24 hr 1:1 observation. Despite reports of ongoing suicidal thoughts, pt is notably help-seeking, describes suicidal thoughts as ego-dystonic, motivated to engage in treatment. Discussed patient's concerns regarding medications, facility corroborates plan to arrange earlier appointment with outpatient psychiatrist. No psychiatric indication for hospitalization at this time. 34 year old male, single, domiciled at [facility] (with 1:1), employed as assistant reader at [employer], with PMH asthma, DM, BPH, urinary retention, hx testicular cancer s/p orchidectomy, GERD, hypothyroidism, HTN, HLD, myopia, PPH autism spectrum disorder, moderate intellectual disability; other diagnoses includes bipolar disorder, impulse control disorder, HAVEN, mood disorder NOS, PTSD, ADHD and factitious disorder. with multiple past inpatient psychiatric admissions (as per [employer] staff, most recently 4/2024, hx of cutting himself, charted longstanding hx of endorsing SI (situational: mostly stemming from discontent of living environment) as well as hx of aggression toward group home staff; BIBEMS a/b patient reporting suicidal ideation, recent self harm with superficial abrasions to stomach and forearm.     Current presentation similar to multiple prior encounters in which patient presents with suicidal thoughts and recent self harm requesting inpatient psychiatric admission. Each time, patient is preoccupied with inpatient admission and medication changes. At his baseline, patient has a low frustration tolerance, poor coping skills, and signficant cognitive limitations that are hallmark features of his autism diagnosis and intellectual disability. This manifests in suicidal statements, nonlethal self injurious behaviors like cutting and head banging, and compulsions that are in line with his diagnosis. His current presentation may have been triggered by fluctuating staff supervision and recent relationship break up. At this time, Patient's clinical presentation is consistent with his chronic state and consistent with his baseline. Patient has been calm, cooperative and behaved appropriately the entire time he was at the ED. Patient is on 24 hr 1:1 observation at his residence, currently future oriented and help seeking, and motivated to engage in treatment. He would not benefit from inpatient admission at this time.

## 2025-02-03 NOTE — ED ADULT NURSE NOTE - NS ED NURSE AMBULANCES2
What Is The Reason For Today's Visit?: Preventative Skin Check [facility]

## 2025-02-03 NOTE — ED ADULT TRIAGE NOTE - HISTORY OF COVID-19 VACCINATION
Please clarify if patient is taking her synthroid by itself and waiting 1 hour before taking additional medications, eating, or drinking?   Will need to repeat cbc in 2 weeks due to borderline white count-- will need to recheck; I will place order to [facility] lab if she wants to go to [place] instead of driving to [place]  Her urine + beta UTI; will send keflex to pharmacy for her to take as indicated-- needs to stop MVI while on medication   Yes

## 2025-02-05 ENCOUNTER — APPOINTMENT (OUTPATIENT)
Dept: GASTROENTEROLOGY | Facility: CLINIC | Age: 35
End: 2025-02-05

## 2025-02-10 ENCOUNTER — EMERGENCY (EMERGENCY)
Facility: HOSPITAL | Age: 35
LOS: 1 days | Discharge: HOME | End: 2025-02-10
Attending: EMERGENCY MEDICINE | Admitting: EMERGENCY MEDICINE
Payer: MEDICAID

## 2025-02-10 ENCOUNTER — APPOINTMENT (OUTPATIENT)
Dept: PULMONOLOGY | Facility: CLINIC | Age: 35
End: 2025-02-10

## 2025-02-10 VITALS
SYSTOLIC BLOOD PRESSURE: 122 MMHG | WEIGHT: 250 LBS | TEMPERATURE: 98 F | OXYGEN SATURATION: 97 % | HEIGHT: 69 IN | DIASTOLIC BLOOD PRESSURE: 75 MMHG | RESPIRATION RATE: 19 BRPM | HEART RATE: 125 BPM

## 2025-02-10 VITALS
OXYGEN SATURATION: 95 % | HEART RATE: 102 BPM | TEMPERATURE: 98 F | SYSTOLIC BLOOD PRESSURE: 123 MMHG | DIASTOLIC BLOOD PRESSURE: 70 MMHG

## 2025-02-10 DIAGNOSIS — Z90.79 ACQUIRED ABSENCE OF OTHER GENITAL ORGAN(S): Chronic | ICD-10-CM

## 2025-02-10 LAB
APPEARANCE UR: CLEAR — SIGNIFICANT CHANGE UP
BILIRUB UR-MCNC: NEGATIVE — SIGNIFICANT CHANGE UP
COLOR SPEC: YELLOW — SIGNIFICANT CHANGE UP
DIFF PNL FLD: NEGATIVE — SIGNIFICANT CHANGE UP
GLUCOSE UR QL: NEGATIVE MG/DL — SIGNIFICANT CHANGE UP
KETONES UR-MCNC: ABNORMAL MG/DL
LEUKOCYTE ESTERASE UR-ACNC: NEGATIVE — SIGNIFICANT CHANGE UP
NITRITE UR-MCNC: NEGATIVE — SIGNIFICANT CHANGE UP
PH UR: 5.5 — SIGNIFICANT CHANGE UP (ref 5–8)
PROT UR-MCNC: NEGATIVE MG/DL — SIGNIFICANT CHANGE UP
SP GR SPEC: 1.01 — SIGNIFICANT CHANGE UP (ref 1–1.03)
UROBILINOGEN FLD QL: 0.2 MG/DL — SIGNIFICANT CHANGE UP (ref 0.2–1)

## 2025-02-10 PROCEDURE — 51701 INSERT BLADDER CATHETER: CPT

## 2025-02-10 PROCEDURE — 81003 URINALYSIS AUTO W/O SCOPE: CPT

## 2025-02-10 PROCEDURE — 99283 EMERGENCY DEPT VISIT LOW MDM: CPT | Mod: 25

## 2025-02-10 PROCEDURE — 99284 EMERGENCY DEPT VISIT MOD MDM: CPT

## 2025-02-10 NOTE — ED PROVIDER NOTE - ATTENDING APP SHARED VISIT CONTRIBUTION OF CARE
Examination reveals a mild MRDD white male in no acute distress with a soft benign nontender abdomen and the suprapubic area that is flat soft and nontender.  I agree with plan of management outlined by PA.

## 2025-02-10 NOTE — ED PROVIDER NOTE - OBJECTIVE STATEMENT
34-year-old male with history of asthma, autism, GERD, BPH, hyperlipidemia, hypothyroidism, impulse control, intellectual disability, testicular cancer, diabetes presents to the ED from group home complaining of difficulty urinating today.  Patient has been seen in ED numerous times for similar complaints, last 2 visits has not required mckeon. Denies fever, chills, abdominal pain, nausea, vomiting, dysuria, hematuria, flank pain.

## 2025-02-10 NOTE — ED ADULT TRIAGE NOTE - BIRTH SEX
· Mood stable at time of assessment  · Resident followed by psychiatry   · Continue buproprion 150 mg po daily  · Continue sertraline 100 mg po daily at bedtime  · Continue quetiapine 25 mg po daily at bedtime  · Continue to monitor mood  · Continue to provide 24/7 supportive care Female

## 2025-02-10 NOTE — ED PROVIDER NOTE - PATIENT PORTAL LINK FT
You can access the FollowMyHealth Patient Portal offered by [facility] by registering at the following website: [email or web address]. By joining [employer]’s FollowMyHealth portal, you will also be able to view your health information using other applications (apps) compatible with our system.

## 2025-02-10 NOTE — ED PROVIDER NOTE - CLINICAL SUMMARY MEDICAL DECISION MAKING FREE TEXT BOX
34-year-old white male with MRDD from group home here  for evaluation of somewhat difficulty passing urine for the past day requiring thorough evaluation as well as bladder ultrasound.  No fever no chills no nausea no vomiting no dysuria no hematuria.

## 2025-02-10 NOTE — ED PROVIDER NOTE - CARE PROVIDER_API CALL
Yahaira Huang  Urology  [address] [place] [address], [address]  [place] [address]  Phone: [phone]  Fax: [phone]  Follow Up Time: 1-3 Days

## 2025-02-10 NOTE — ED PROVIDER NOTE - PROGRESS NOTE DETAILS
Bladder scan shows approximately 500 cc of urine in bladder however upon straight catheterization patient with 300 cc of urine in bladder and does not require mckeon at this time.  Urine sent to lab to evaluate for infection.  Will DC patient back to group home with outpatient urology follow-up.

## 2025-02-10 NOTE — ED PROVIDER NOTE - CARE PROVIDERS DIRECT ADDRESSES
[email or web address]

## 2025-02-10 NOTE — ED ADULT TRIAGE NOTE - CCCP TRG CHIEF CMPLNT
urinary retention Spironolactone Counseling: Patient advised regarding risks of diarrhea, abdominal pain, hyperkalemia, birth defects (for female patients), liver toxicity and renal toxicity. The patient may need blood work to monitor liver and kidney function and potassium levels while on therapy. The patient verbalized understanding of the proper use and possible adverse effects of spironolactone.  All of the patient's questions and concerns were addressed.

## 2025-02-10 NOTE — ED ADULT TRIAGE NOTE - PRO INTERPRETER NEED 2
SUICIDE RISK ASSESSMENT  YES NO If yes, describe      x Suicide attempt in last 24 hour?      x   Suicidal thoughts?      x Plan or considering various methods? If so, Describe:       x Access to means? If so- Specify weapon location     x   Indication of substance abuse/dependence?      x Attempts in past?  How many?  Date of most recent:   Method used?       x Any family members, loved ones, friends who committed suicide?      x Recent deaths, losses, anniversary dates?      x Has made preparations for death?      x Lack of support system?    x    Verbal contract for safety?   x    Patient has no current intent,or plan, but agrees to contact provider if suicidal ideation I arises.    x   Patient given emergency 24 hour access information.      VIOLENCE/HOMICIDE POTENTIAL   YES  NO  If yes, describe current or past violence    [ ]  [X]  Threat made to harm or kill someone?   A specific individual? Name:     [ ]  [X]  Access to weapon? Where is weapon?    [ ]  [X]  History of violence/aggressive behavior to others?    [ ]  [X]  History of significant damage to property?    [ x]  [ ]  Indication of substance abuse/dependence?    [ ]  [X]  Witnessed violence or significant aggression?             English

## 2025-02-12 ENCOUNTER — APPOINTMENT (OUTPATIENT)
Dept: GASTROENTEROLOGY | Facility: CLINIC | Age: 35
End: 2025-02-12

## 2025-02-12 ENCOUNTER — APPOINTMENT (OUTPATIENT)
Dept: PULMONOLOGY | Facility: CLINIC | Age: 35
End: 2025-02-12
Payer: MEDICAID

## 2025-02-12 VITALS
HEIGHT: 70 IN | DIASTOLIC BLOOD PRESSURE: 80 MMHG | HEART RATE: 87 BPM | OXYGEN SATURATION: 96 % | RESPIRATION RATE: 16 BRPM | SYSTOLIC BLOOD PRESSURE: 132 MMHG | WEIGHT: 279 LBS | BODY MASS INDEX: 39.94 KG/M2

## 2025-02-12 VITALS — BODY MASS INDEX: 39.94 KG/M2 | WEIGHT: 279 LBS | HEIGHT: 70 IN

## 2025-02-12 DIAGNOSIS — E61.1 IRON DEFICIENCY: ICD-10-CM

## 2025-02-12 DIAGNOSIS — G47.33 OBSTRUCTIVE SLEEP APNEA (ADULT) (PEDIATRIC): ICD-10-CM

## 2025-02-12 DIAGNOSIS — K76.0 FATTY (CHANGE OF) LIVER, NOT ELSEWHERE CLASSIFIED: ICD-10-CM

## 2025-02-12 DIAGNOSIS — E66.9 OBESITY, UNSPECIFIED: ICD-10-CM

## 2025-02-12 DIAGNOSIS — K58.2 MIXED IRRITABLE BOWEL SYNDROME: ICD-10-CM

## 2025-02-12 LAB
ALBUMIN SERPL ELPH-MCNC: 4.4 G/DL
ALP BLD-CCNC: 76 U/L
ALT SERPL-CCNC: 76 U/L
ANION GAP SERPL CALC-SCNC: 12 MMOL/L
AST SERPL-CCNC: 43 U/L
BASOPHILS # BLD AUTO: 0.03 K/UL
BASOPHILS NFR BLD AUTO: 0.4 %
BILIRUB SERPL-MCNC: 0.3 MG/DL
BUN SERPL-MCNC: 14 MG/DL
CALCIUM SERPL-MCNC: 10 MG/DL
CHLORIDE SERPL-SCNC: 100 MMOL/L
CO2 SERPL-SCNC: 28 MMOL/L
CREAT SERPL-MCNC: 1.13 MG/DL
EGFR: 87 ML/MIN/1.73M2
EOSINOPHIL # BLD AUTO: 0.16 K/UL
EOSINOPHIL NFR BLD AUTO: 2.4 %
FERRITIN SERPL-MCNC: 43 NG/ML
GLUCOSE SERPL-MCNC: 95 MG/DL
HCT VFR BLD CALC: 45.2 %
HGB BLD-MCNC: 14.2 G/DL
IMM GRANULOCYTES NFR BLD AUTO: 0.3 %
IRON SATN MFR SERPL: 25 %
IRON SERPL-MCNC: 99 UG/DL
LYMPHOCYTES # BLD AUTO: 2.86 K/UL
LYMPHOCYTES NFR BLD AUTO: 42.8 %
MAN DIFF?: NORMAL
MCHC RBC-ENTMCNC: 24.4 PG
MCHC RBC-ENTMCNC: 31.4 G/DL
MCV RBC AUTO: 77.7 FL
MONOCYTES # BLD AUTO: 0.58 K/UL
MONOCYTES NFR BLD AUTO: 8.7 %
NEUTROPHILS # BLD AUTO: 3.03 K/UL
NEUTROPHILS NFR BLD AUTO: 45.4 %
PLATELET # BLD AUTO: 238 K/UL
POTASSIUM SERPL-SCNC: 5.1 MMOL/L
PROT SERPL-MCNC: 6.9 G/DL
RBC # BLD: 5.82 M/UL
RBC # FLD: 16.9 %
SODIUM SERPL-SCNC: 139 MMOL/L
TIBC SERPL-MCNC: 397 UG/DL
UIBC SERPL-MCNC: 298 UG/DL
WBC # FLD AUTO: 6.68 K/UL

## 2025-02-12 PROCEDURE — 99213 OFFICE O/P EST LOW 20 MIN: CPT

## 2025-02-12 PROCEDURE — 99203 OFFICE O/P NEW LOW 30 MIN: CPT

## 2025-02-12 RX ORDER — MIRTAZAPINE 15 MG/1
15 TABLET, FILM COATED ORAL
Refills: 0 | Status: ACTIVE | COMMUNITY

## 2025-02-12 RX ORDER — PANTOPRAZOLE 40 MG/1
TABLET, DELAYED RELEASE ORAL
Refills: 0 | Status: ACTIVE | COMMUNITY

## 2025-02-12 RX ORDER — FINASTERIDE 5 MG/1
5 TABLET, FILM COATED ORAL
Refills: 0 | Status: ACTIVE | COMMUNITY

## 2025-02-17 NOTE — ED PROVIDER NOTE - DISPOSITION TYPE
Plan of care complete. AVS reviewed with pt. Pt to  meds from [facility]. Pt to dc home with son   DISCHARGE

## 2025-02-19 ENCOUNTER — EMERGENCY (EMERGENCY)
Facility: HOSPITAL | Age: 35
LOS: 0 days | Discharge: HOME | End: 2025-02-20
Attending: STUDENT IN AN ORGANIZED HEALTH CARE EDUCATION/TRAINING PROGRAM
Payer: MEDICAID

## 2025-02-19 VITALS
SYSTOLIC BLOOD PRESSURE: 123 MMHG | HEART RATE: 95 BPM | HEIGHT: 69 IN | TEMPERATURE: 98 F | OXYGEN SATURATION: 98 % | DIASTOLIC BLOOD PRESSURE: 90 MMHG | RESPIRATION RATE: 18 BRPM | WEIGHT: 289.03 LBS

## 2025-02-19 DIAGNOSIS — N40.1 BENIGN PROSTATIC HYPERPLASIA WITH LOWER URINARY TRACT SYMPTOMS: ICD-10-CM

## 2025-02-19 DIAGNOSIS — F68.10 FACTITIOUS DISORDER IMPOSED ON SELF, UNSPECIFIED: ICD-10-CM

## 2025-02-19 DIAGNOSIS — R45.851 SUICIDAL IDEATIONS: ICD-10-CM

## 2025-02-19 DIAGNOSIS — F41.1 GENERALIZED ANXIETY DISORDER: ICD-10-CM

## 2025-02-19 DIAGNOSIS — E03.9 HYPOTHYROIDISM, UNSPECIFIED: ICD-10-CM

## 2025-02-19 DIAGNOSIS — K21.9 GASTRO-ESOPHAGEAL REFLUX DISEASE WITHOUT ESOPHAGITIS: ICD-10-CM

## 2025-02-19 DIAGNOSIS — I10 ESSENTIAL (PRIMARY) HYPERTENSION: ICD-10-CM

## 2025-02-19 DIAGNOSIS — F63.9 IMPULSE DISORDER, UNSPECIFIED: ICD-10-CM
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
## 2025-02-19 DIAGNOSIS — F84.0 AUTISTIC DISORDER: ICD-10-CM

## 2025-02-19 DIAGNOSIS — E78.5 HYPERLIPIDEMIA, UNSPECIFIED: ICD-10-CM

## 2025-02-19 DIAGNOSIS — E11.9 TYPE 2 DIABETES MELLITUS WITHOUT COMPLICATIONS: ICD-10-CM

## 2025-02-19 DIAGNOSIS — F90.9 ATTENTION-DEFICIT HYPERACTIVITY DISORDER, UNSPECIFIED TYPE: ICD-10-CM

## 2025-02-19 DIAGNOSIS — Z90.79 ACQUIRED ABSENCE OF OTHER GENITAL ORGAN(S): Chronic | ICD-10-CM

## 2025-02-19 DIAGNOSIS — J45.909 UNSPECIFIED ASTHMA, UNCOMPLICATED: ICD-10-CM

## 2025-02-19 DIAGNOSIS — Z88.6 ALLERGY STATUS TO ANALGESIC AGENT: ICD-10-CM

## 2025-02-19 DIAGNOSIS — Z88.8 ALLERGY STATUS TO OTHER DRUGS, MEDICAMENTS AND BIOLOGICAL SUBSTANCES: ICD-10-CM

## 2025-02-19 DIAGNOSIS — S61.511A LACERATION WITHOUT FOREIGN BODY OF RIGHT WRIST, INITIAL ENCOUNTER: ICD-10-CM

## 2025-02-19 DIAGNOSIS — H52.10 MYOPIA, UNSPECIFIED EYE: ICD-10-CM
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
## 2025-02-19 DIAGNOSIS — R33.8 OTHER RETENTION OF URINE: ICD-10-CM

## 2025-02-19 DIAGNOSIS — F79 UNSPECIFIED INTELLECTUAL DISABILITIES: ICD-10-CM

## 2025-02-19 LAB
ALBUMIN SERPL ELPH-MCNC: 3.8 G/DL — SIGNIFICANT CHANGE UP (ref 3.3–5)
ALP SERPL-CCNC: 71 U/L — SIGNIFICANT CHANGE UP (ref 40–120)
ALT FLD-CCNC: 65 U/L — SIGNIFICANT CHANGE UP (ref 12–78)
AMPHET UR-MCNC: NEGATIVE — SIGNIFICANT CHANGE UP
ANION GAP SERPL CALC-SCNC: 7 MMOL/L — SIGNIFICANT CHANGE UP (ref 5–17)
APAP SERPL-MCNC: <2 UG/ML — LOW (ref 10–30)
APPEARANCE UR: CLEAR — SIGNIFICANT CHANGE UP
AST SERPL-CCNC: 15 U/L — SIGNIFICANT CHANGE UP (ref 15–37)
BARBITURATES UR SCN-MCNC: NEGATIVE — SIGNIFICANT CHANGE UP
BASOPHILS # BLD AUTO: 0.04 K/UL — SIGNIFICANT CHANGE UP (ref 0–0.2)
BASOPHILS NFR BLD AUTO: 0.5 % — SIGNIFICANT CHANGE UP (ref 0–2)
BENZODIAZ UR-MCNC: NEGATIVE — SIGNIFICANT CHANGE UP
BILIRUB SERPL-MCNC: 0.3 MG/DL — SIGNIFICANT CHANGE UP (ref 0.2–1.2)
BILIRUB UR-MCNC: NEGATIVE — SIGNIFICANT CHANGE UP
BUN SERPL-MCNC: 13 MG/DL — SIGNIFICANT CHANGE UP (ref 7–23)
CALCIUM SERPL-MCNC: 9.5 MG/DL — SIGNIFICANT CHANGE UP (ref 8.5–10.1)
CHLORIDE SERPL-SCNC: 104 MMOL/L — SIGNIFICANT CHANGE UP (ref 96–108)
CO2 SERPL-SCNC: 27 MMOL/L — SIGNIFICANT CHANGE UP (ref 22–31)
COCAINE METAB.OTHER UR-MCNC: NEGATIVE — SIGNIFICANT CHANGE UP
COLOR SPEC: YELLOW — SIGNIFICANT CHANGE UP
CREAT SERPL-MCNC: 1.01 MG/DL — SIGNIFICANT CHANGE UP (ref 0.5–1.3)
DIFF PNL FLD: NEGATIVE — SIGNIFICANT CHANGE UP
EGFR: 99 ML/MIN/1.73M2 — SIGNIFICANT CHANGE UP
EOSINOPHIL # BLD AUTO: 0.17 K/UL — SIGNIFICANT CHANGE UP (ref 0–0.5)
EOSINOPHIL NFR BLD AUTO: 2 % — SIGNIFICANT CHANGE UP (ref 0–6)
ETHANOL SERPL-MCNC: <10 MG/DL — SIGNIFICANT CHANGE UP (ref 0–10)
FENTANYL UR QL SCN: NEGATIVE — SIGNIFICANT CHANGE UP
FLUAV AG NPH QL: SIGNIFICANT CHANGE UP
FLUBV AG NPH QL: SIGNIFICANT CHANGE UP
GLUCOSE SERPL-MCNC: 130 MG/DL — HIGH (ref 70–99)
GLUCOSE UR QL: NEGATIVE MG/DL — SIGNIFICANT CHANGE UP
HCT VFR BLD CALC: 44.8 % — SIGNIFICANT CHANGE UP (ref 39–50)
HGB BLD-MCNC: 14.2 G/DL — SIGNIFICANT CHANGE UP (ref 13–17)
IMM GRANULOCYTES # BLD AUTO: 0.06 K/UL — SIGNIFICANT CHANGE UP (ref 0–0.07)
IMM GRANULOCYTES NFR BLD AUTO: 0.7 % — SIGNIFICANT CHANGE UP (ref 0–0.9)
KETONES UR-MCNC: ABNORMAL MG/DL
LEUKOCYTE ESTERASE UR-ACNC: NEGATIVE — SIGNIFICANT CHANGE UP
LYMPHOCYTES # BLD AUTO: 2.56 K/UL — SIGNIFICANT CHANGE UP (ref 1–3.3)
LYMPHOCYTES NFR BLD AUTO: 29.9 % — SIGNIFICANT CHANGE UP (ref 13–44)
MCHC RBC-ENTMCNC: 24.9 PG — LOW (ref 27–34)
MCHC RBC-ENTMCNC: 31.7 G/DL — LOW (ref 32–36)
MCV RBC AUTO: 78.5 FL — LOW (ref 80–100)
METHADONE UR-MCNC: NEGATIVE — SIGNIFICANT CHANGE UP
MONOCYTES # BLD AUTO: 0.64 K/UL — SIGNIFICANT CHANGE UP (ref 0–0.9)
MONOCYTES NFR BLD AUTO: 7.5 % — SIGNIFICANT CHANGE UP (ref 2–14)
NEUTROPHILS # BLD AUTO: 5.1 K/UL — SIGNIFICANT CHANGE UP (ref 1.8–7.4)
NEUTROPHILS NFR BLD AUTO: 59.4 % — SIGNIFICANT CHANGE UP (ref 43–77)
NITRITE UR-MCNC: NEGATIVE — SIGNIFICANT CHANGE UP
NRBC # BLD AUTO: 0 K/UL — SIGNIFICANT CHANGE UP (ref 0–0)
NRBC # FLD: 0 K/UL — SIGNIFICANT CHANGE UP (ref 0–0)
NRBC BLD AUTO-RTO: 0 /100 WBCS — SIGNIFICANT CHANGE UP (ref 0–0)
OPIATES UR-MCNC: NEGATIVE — SIGNIFICANT CHANGE UP
PCP SPEC-MCNC: SIGNIFICANT CHANGE UP
PCP UR-MCNC: NEGATIVE — SIGNIFICANT CHANGE UP
PH UR: 6 — SIGNIFICANT CHANGE UP (ref 5–8)
PLATELET # BLD AUTO: 256 K/UL — SIGNIFICANT CHANGE UP (ref 150–400)
PMV BLD: 11.4 FL — SIGNIFICANT CHANGE UP (ref 7–13)
POTASSIUM SERPL-MCNC: 3.8 MMOL/L — SIGNIFICANT CHANGE UP (ref 3.5–5.3)
POTASSIUM SERPL-SCNC: 3.8 MMOL/L — SIGNIFICANT CHANGE UP (ref 3.5–5.3)
PROT SERPL-MCNC: 7.1 GM/DL — SIGNIFICANT CHANGE UP (ref 6–8.3)
PROT UR-MCNC: NEGATIVE MG/DL — SIGNIFICANT CHANGE UP
RBC # BLD: 5.71 M/UL — SIGNIFICANT CHANGE UP (ref 4.2–5.8)
RBC # FLD: 15.4 % — HIGH (ref 10.3–14.5)
RSV RNA NPH QL NAA+NON-PROBE: SIGNIFICANT CHANGE UP
SALICYLATES SERPL-MCNC: <1.7 MG/DL — LOW (ref 2.8–20)
SARS-COV-2 RNA SPEC QL NAA+PROBE: SIGNIFICANT CHANGE UP
SODIUM SERPL-SCNC: 138 MMOL/L — SIGNIFICANT CHANGE UP (ref 135–145)
SP GR SPEC: 1.01 — SIGNIFICANT CHANGE UP (ref 1–1.03)
THC UR QL: NEGATIVE — SIGNIFICANT CHANGE UP
UROBILINOGEN FLD QL: 0.2 MG/DL — SIGNIFICANT CHANGE UP (ref 0.2–1)
WBC # BLD: 8.57 K/UL — SIGNIFICANT CHANGE UP (ref 3.8–10.5)
WBC # FLD AUTO: 8.57 K/UL — SIGNIFICANT CHANGE UP (ref 3.8–10.5)

## 2025-02-19 PROCEDURE — 0241U: CPT

## 2025-02-19 PROCEDURE — 93005 ELECTROCARDIOGRAM TRACING: CPT

## 2025-02-19 PROCEDURE — 99284 EMERGENCY DEPT VISIT MOD MDM: CPT | Mod: 25

## 2025-02-19 PROCEDURE — 81003 URINALYSIS AUTO W/O SCOPE: CPT

## 2025-02-19 PROCEDURE — 99285 EMERGENCY DEPT VISIT HI MDM: CPT

## 2025-02-19 PROCEDURE — 36415 COLL VENOUS BLD VENIPUNCTURE: CPT

## 2025-02-19 PROCEDURE — 93010 ELECTROCARDIOGRAM REPORT: CPT

## 2025-02-19 PROCEDURE — 80307 DRUG TEST PRSMV CHEM ANLYZR: CPT

## 2025-02-19 PROCEDURE — 85025 COMPLETE CBC W/AUTO DIFF WBC: CPT

## 2025-02-19 PROCEDURE — 80053 COMPREHEN METABOLIC PANEL: CPT

## 2025-02-19 RX ORDER — MAGNESIUM, ALUMINUM HYDROXIDE 200-225/5
30 SUSPENSION, ORAL (FINAL DOSE FORM) ORAL ONCE
Refills: 0 | Status: COMPLETED | OUTPATIENT
Start: 2025-02-19 | End: 2025-02-19

## 2025-02-19 RX ORDER — FAMOTIDINE 10 MG/ML
20 INJECTION INTRAVENOUS ONCE
Refills: 0 | Status: COMPLETED | OUTPATIENT
Start: 2025-02-19 | End: 2025-02-19

## 2025-02-19 RX ADMIN — FAMOTIDINE 20 MILLIGRAM(S): 10 INJECTION INTRAVENOUS at 22:43

## 2025-02-19 NOTE — ED ADULT NURSE NOTE - OBJECTIVE STATEMENT
34y male presented to the ED from group home with SI/HI thoughts. Pt cut right wrist with screw, bleeding controlled. Pt endorses wanting to die, but has no plan. Pt states "I want to punch walls" Pt with prior SI attempt. pt states he cut his throat. Pt endorses hearing voice. Pt endorses left sided groin pain.

## 2025-02-19 NOTE — ED PROVIDER NOTE - CONSTITUTIONAL MENTATION
Upon arrival, pt alert but uncomfortable. Saturating mid-80s, NRB increased with improvement of saturation. BP stable and WNL. CXR reviewed showing L sided opacities consistent with aspiration while laying on L side as pt was during endoscopy. HR ~120, NSR. T 102.3, given 1g iv tylenol and blood cultures drawn. Pt transitioned from NRB to HFNC. Stat dose of zosyn given, will continue maintenance abx for aspiration. Primary team at bedside, agree with plan to recheck ABG in about an hour and wean HFNC as tolerated. PT will then go to  floor.  alert/awake

## 2025-02-19 NOTE — ED PROVIDER NOTE - PATIENT PORTAL LINK FT
You can access the FollowMyHealth Patient Portal offered by [facility] by registering at the following website: [email or web address]. By joining [employer]’s FollowMyHealth portal, you will also be able to view your health information using other applications (apps) compatible with our system.

## 2025-02-19 NOTE — ED PROVIDER NOTE - PHYSICAL EXAMINATION
Constitutional: NAD AAOx3  Eyes: PERRLA EOMI  Head: Normocephalic atraumatic  Mouth: MMM  Cardiac: regular rate   Resp: No respiratory distress.   GI: Abd s/nt/nd  Neuro: CN2-12 intact, strength is 5/5 in all extremities.   Skin: No visible rashes + siperficail wrist laceration right wrist.

## 2025-02-19 NOTE — ED ADULT TRIAGE NOTE - CHIEF COMPLAINT QUOTE
brought in from Group Home.  patient c/o rectal pain from multiple BMs today and thought of hurting himself.  patient states he has refused his psych medications for 2 weeks because he does not like them.  this evening he used a screw to cut his right wrist to harm himself.  patient has small abrasion to right wrist, no active bleeding.  dry clean dressing placed in triage.  1:1 initiated for safety.

## 2025-02-19 NOTE — ED PROVIDER NOTE - PROGRESS NOTE DETAILS
Ramírez: Patient signed out to me pending psychiatric evaluation. Patient evaluated by psychiatrist and cleared. patient is medically clear. Stable to return to group home. Patient and aide verbalize understanding of return precautions and f/u.

## 2025-02-19 NOTE — ED PROVIDER NOTE - CLINICAL SUMMARY MEDICAL DECISION MAKING FREE TEXT BOX
adult male lives at a group home who has a history of self harming behavior, autism.  Hearing voices but that is at baseline.  He is coming in today because he went to the bathroom and slit his right wrist with a rosy screw.  He then flushed the screw down the toilet bowl.  The aide was not able to see the accident happened.  He was then brought into the ER for evaluation.  The patient is telling me that he is hearing voices that are getting worse and they are telling him to slit his throat.  He is not seeing things.  No drug use or alcohol use.  Patient says he had a in-hospital psych admission last year in outside hospital.  His vitals are stable.  He has a very superficial horizontal laceration to the right wrist that is not bleeding.  He is calm friendly.  Plan for medical clearance with labs UA viral swab and psych.

## 2025-02-20 VITALS
SYSTOLIC BLOOD PRESSURE: 121 MMHG | HEART RATE: 88 BPM | OXYGEN SATURATION: 100 % | RESPIRATION RATE: 18 BRPM | TEMPERATURE: 98 F | DIASTOLIC BLOOD PRESSURE: 78 MMHG

## 2025-02-20 DIAGNOSIS — F79 UNSPECIFIED INTELLECTUAL DISABILITIES: ICD-10-CM

## 2025-02-20 DIAGNOSIS — F84.0 AUTISTIC DISORDER: ICD-10-CM

## 2025-02-20 PROCEDURE — 90792 PSYCH DIAG EVAL W/MED SRVCS: CPT | Mod: 95

## 2025-02-20 RX ORDER — CLONIDINE HYDROCHLORIDE 0.2 MG/1
0.1 TABLET ORAL ONCE
Refills: 0 | Status: DISCONTINUED | OUTPATIENT
Start: 2025-02-20 | End: 2025-02-20

## 2025-02-20 RX ORDER — ALPRAZOLAM 2 MG
0.25 TABLET ORAL ONCE
Refills: 0 | Status: DISCONTINUED | OUTPATIENT
Start: 2025-02-20 | End: 2025-02-20

## 2025-02-20 RX ADMIN — Medication 0.25 MILLIGRAM(S): at 00:37

## 2025-02-20 NOTE — ED BEHAVIORAL HEALTH ASSESSMENT NOTE - OTHER
limited peer  at [facility] chronically impaired by hx staff member at [facility] peers

## 2025-02-20 NOTE — ED BEHAVIORAL HEALTH ASSESSMENT NOTE - DETAILS
Zyprexa - rash hx aggression towards staff pt reports chronic hx fluctuating suicidal thoughts bullying patient initially stated he cut himself due to SI though, but then stated he did so being they relieve stress patient able to discuss coping skills (see above), patient expresses that he will go to another hospital if he is not admitted (future oriented, help seeking), patient has constant observation at group home group home

## 2025-02-20 NOTE — ED BEHAVIORAL HEALTH ASSESSMENT NOTE - NSBHMSETHTCONTENT_PSY_A_CORE
contingent on discharge ('I don't' feel safe at home)/Unremarkable/Suicidality Unremarkable/Suicidality

## 2025-02-20 NOTE — ED BEHAVIORAL HEALTH ASSESSMENT NOTE - VIOLENCE RISK FACTORS:
Feeling of being under threat and being unable to control threat/Impulsivity/History of being victimized/traumatized

## 2025-02-20 NOTE — ED BEHAVIORAL HEALTH ASSESSMENT NOTE - RISK ASSESSMENT
rf: ASD, impulse control disorder, ID, mood symptoms  pf: help seeking, 24hr supervision, future/goal oriented, positive therapeutic relationships  current: though patient expresses SI, denies current IP, and is also future oriented stated he will go to another hospital if he is not admitted here.

## 2025-02-20 NOTE — ED BEHAVIORAL HEALTH ASSESSMENT NOTE - OTHER PAST PSYCHIATRIC HISTORY (INCLUDE DETAILS REGARDING ONSET, COURSE OF ILLNESS, INPATIENT/OUTPATIENT TREATMENT)
multiple past inpatient psychiatric admissions (as per [facility] staff, most recently 4/2024, hx of cutting himself, charted longstanding hx of endorsing SI, no known prior suicide attempts or significant violence; in outpatient treatment at [facility]

## 2025-02-20 NOTE — ED BEHAVIORAL HEALTH ASSESSMENT NOTE - DESCRIPTION
domiciled at [facility] since 9/2024, previously lived in group home in [place], pt reports being transferred d/t bullying Pt in good behavioral control in ED asthma, DM, BPH, urinary retention, hx testicular cancer s/p orchidectomy, GERD, hypothyroidism, HTN, HLD, myopia Pt in good behavioral control in ED    Vital Signs Last 24 Hrs  T(C): 36.8 (20 Feb 2025 02:43), Max: 36.9 (20 Feb 2025 00:35)  T(F): 98.3 (20 Feb 2025 02:43), Max: 98.5 (20 Feb 2025 00:35)  HR: 88 (20 Feb 2025 02:43) (87 - 95)  BP: 121/78 (20 Feb 2025 02:43) (111/65 - 123/90)  BP(mean): --  RR: 18 (20 Feb 2025 02:43) (18 - 20)  SpO2: 100% (20 Feb 2025 02:43) (96% - 100%)    Parameters below as of 20 Feb 2025 02:43  Patient On (Oxygen Delivery Method): room air per chart review domiciled at [facility] since 9/2024, previously lived in group home in [place], pt reports being transferred d/t bullying

## 2025-02-20 NOTE — ED ADULT NURSE REASSESSMENT NOTE - NS ED NURSE REASSESS COMMENT FT1
Received report from ANALI PHILLIPS Pt maintained on constant observation, resting comfortably in stretcher, breathing equally and unlabored. Pt requested anxiety medication, MD Elmore aware. POC explained, no further questions at this time.

## 2025-02-20 NOTE — ED BEHAVIORAL HEALTH ASSESSMENT NOTE - ADDITIONAL DETAILS ALL
describes suicidal thoughts contingent only on discharge; hx cutting self with blunt objects (e.g. credit card, spoons), reportedly also stabbed himself with spoons at [facility] see HPI

## 2025-02-20 NOTE — ED BEHAVIORAL HEALTH ASSESSMENT NOTE - SUMMARY
34 year old male, single, domiciled at [facility] (with 24hr 1:1), employed as assistant reader at [employer], with PMHx of asthma, DM, BPH, urinary retention, hx testicular cancer s/p orchidectomy, GERD, hypothyroidism, HTN, HLD, myopia, PPHx of autism spectrum disorder, moderate intellectual disability; other diagnoses includes bipolar disorder, impulse control disorder, HAVEN, mood disorder NOS, PTSD, ADHD and factitious disorder, with multiple past inpatient psychiatric admissions (as per [facility] staff, most recently 4/2024), hx of cutting himself, charted longstanding hx of endorsing SI (situational: mostly stemming from discontent of living environment or situation) as well as hx of aggression toward [employer] staff; BIBEMS, due to SI and superficial cut to wrist. On evaluation, similar to past presentations, patient has euthymic affect, is restless, has concrete thought process, and is preoccupied with admission. Patient reports feeling sad and missing his ex-fiance especially since it was recently Miramontes's Day. He reports this led to SI and cutting himself, he initially states he cut himself because he was having SI but then states it was to relieve stress.  Patient denied speaking to therapist about these feelings, states he didn't want to speak to therapist, and just wanted to be admitted and change his medication. Discussed that medications would not stop him from feeling his emotions/missing his fiance and discussed coping skills (patient mentioned talking to staff, playing video games, and taking a walk). Patient perseverates on being admitted to the hospital and states if he is not admitted here, he'll just go to another hospital to be admitted. Per collateral from group home staff, patient is at his baseline.    As noted in previous assessments, current presentation similar to multiple prior encounters in which patient presents with suicidal thoughts and recent self harm requesting inpatient psychiatric admission. Each time, patient is preoccupied with inpatient admission and medication changes. At his baseline, patient has a low frustration tolerance, poor coping skills, and significant cognitive limitations that are hallmark features of his autism diagnosis and intellectual disability. This manifests in suicidal statements, nonlethal self injurious behaviors like cutting and head banging, and compulsions that are in line with his diagnosis. His current presentation appears to be triggered by missing his ex-fiance in setting of Miramontes's Day which recently passed. At this time, Patient's clinical presentation is consistent with his chronic state and consistent with his baseline. Patient has been calm, cooperative and behaved appropriately the entire time he was at the ED. Patient is on 24 hr 1:1 observation at his residence, currently future oriented and help seeking, and motivated to engage in treatment. He would not benefit from inpatient admission at this time.    Of note, group home staff state they have a meeting scheduled tomorrow to discuss patient's case given this pattern of behavior and frequent ED visits to see if they can update behavioral plan in any way to mitigate this. Staff deny any safety concerns with patient returning back to group home.  Recommended checking room and bathroom for any sharps as patient was able to get a nail/screw and recommended follow up with outpatient psychiatrist ASAP, staff expressed understanding and is amenable.

## 2025-02-20 NOTE — ED BEHAVIORAL HEALTH ASSESSMENT NOTE - HPI (INCLUDE ILLNESS QUALITY, SEVERITY, DURATION, TIMING, CONTEXT, MODIFYING FACTORS, ASSOCIATED SIGNS AND SYMPTOMS)
I'm trying tog et admitted, cause i'm trying to be suicidal  my meds  my psych doctor can't fix them  cutting  suicidal and hearing voices, to end my life  eating good  trouble sleeping  I like the group  its not the group home   old tita - that's why i'm feeling like this, that's why i'm feeling like this, that's why i've been like this  I don't want tot alk  she kissed another gianni  old [facility] home  cause   try to talk to staff  play games - video games, distract self  take walks  helps me with stressed  Suicidal  2 weeks  on stomach to   gotta take medications every day  need to get admitted today, cause I don't want to come back to   want to go to another hospital, then i'll just go to another hosptial    34 year old male, single, domiciled at [facility] (with 1:1), employed as assistant reader at [employer], with PMH asthma, DM, BPH, urinary retention, hx testicular cancer s/p orchidectomy, GERD, hypothyroidism, HTN, HLD, myopia, PPH autism spectrum disorder, moderate intellectual disability; other diagnoses includes bipolar disorder, impulse control disorder, HAVEN, mood disorder NOS, PTSD, ADHD and factitious disorder, with multiple past inpatient psychiatric admissions (as per [employer] staff, most recently 4/2024, hx of cutting himself, charted longstanding hx of endorsing SI (situational: mostly stemming from discontent of living environment) as well as hx of aggression toward [employer] staff; BIBEMS self activated for SI and self harm to stomach and forearm.     On eval, pt presents with euthymic affect, restless, concrete thought process, and preoccupied with admission. Pt reports he has felt suicidal for the past 2 months and last night, cut himself on his arm and stomach with a hangar and piece of plastic. He denies any specific trigger last night, but he and his ex fiance broke up 2 months ago. Since then, he's been feeling depressed with difficulty sleeping and eating. He repeatedly requests inpatient hospitalization to fix his meds. He describes chronic SI that is ego-dystonic, no current intent or plan. He reports he's been "hurting himself for 6 months, cutting myself everyday for the past 6 months so I can get admitted". Pt states he calls 911 himself almost daily to come to the hospital but they always clear him. Pt says he hurts himself to feel better and sometimes also bangs his head on the wall. He denies any recent med changes and feels better when his meds are decreased. Pt describes voices "from his head", starting 2 months ago. He sees his therapist monthly and psychiatrist every couple months.     Collateral from garfield Gregory at bedside:   Staff states he usually works at night, when pt is sleeping, but has some familiarity with him. He states pt has a history of low frustration tolerance, expressing SI and self harm, and this usually happens when pt feels more isolated. Staff states pt appeared to be smiling today, interacting with staff, and seeking attention more from staff. He states he has observed patient during depressive episodes or in acute distress, and today pt's demeanor does not appear similar to those times. Staff try to keep him busy and preoccupied with activities and company, however sometimes when he is left more alone or bored can act out like this. Staff confirms pt is supposed to be on constant observation. 34 year old male, single, domiciled at [facility] (with 24hr 1:1), employed as assistant reader at [employer], with PMHx of asthma, DM, BPH, urinary retention, hx testicular cancer s/p orchidectomy, GERD, hypothyroidism, HTN, HLD, myopia, PPHx of autism spectrum disorder, moderate intellectual disability; other diagnoses includes bipolar disorder, impulse control disorder, HAVEN, mood disorder NOS, PTSD, ADHD and factitious disorder, with multiple past inpatient psychiatric admissions (as per [employer] staff, most recently 4/2024), hx of cutting himself, charted longstanding hx of endorsing SI (situational: mostly stemming from discontent of living environment or situation) as well as hx of aggression toward [employer] staff; BIBEMS, due to SI and superficial cut to wrist.    On evaluation, similar to past presentations, patient has euthymic affect, is restless, has concrete thought process, and is preoccupied with admission. Upon starting interview, without prompting, patient states "I'm trying to get admitted cause I'm suicidal." He then states "he wants his medications adjusted and his psych doctor can't fix them." When asked what is making him suicidal, patient states "my meds." He then shows this provider his bandaged wrist and states "see I cut myself, so I need to be admitted." He reports he wants to be admitted to his medication can be changed. When asked again if anything has led patient to feel worse, patient states "he misses his fiance." He initially states fiance left him 3 years ago, but then later states it was a few months ago, and that "she kissed another gianni a month ago." He states that he is sad because he misses her and that's what led him to feeling SI and harming self. Patient denied speaking to therapist about this, states he didn't want to speak to therapist, and just wanted to be admitted and change his medication. He initially states hes been cutting himself daily for months, but then later in interview states hes been cutting himself daily for two weeks, and then later stated he last cut himself a week ago on his stomach. He initially states he cut his wrist today because he was feeling suicidal, but then when asked again later, he states "because it relieves stress." and he was sad about ex-fiance. Patient reports poor sleep. He reports good appetite. Patient denies anhedonia (states he enjoys playing video games and going on walks). Patient perseverates on being admitted to the hospital and states if he is not admitted, he'll just go to another hospital to be admitted. When asked if there were any other concerns patient has, he states "oh I'm also hearing voices, they tell me to cut myself." Of note, this is per patient's baseline, and patient does not appear internally preoccupied. Discussed coping skills when patient is feeling sad, he expressed talking to staff, playing video games, and taking a walk.    Collateral obtained from group home staff by WAGNER Cardoso, see separate note for details. Briefly, staff reports that this is patient's baseline, that patient has a hx of low frustration tolerance, expressing SI, and self harm, and coming to ED with desire to be hospitalized. They report that patient is under constant observation in group home (aside from when he uses bathroom, and if he is in room, they check on him every 5 minutes). Staff state they have a meeting scheduled tomorrow to discuss patient's case given this pattern of behavior and frequent ED visits to see if they can update behavioral plan in any way to mitigate this. Staff deny any safety concerns with patient returning back to group home.  Recommended checking room and bathroom for any sharps as patient was able to get a nail/screw and recommended follow up with outpatient psychiatrist ASAP, staff expressed understanding and is amenable. 34 year old male, single, domiciled at [facility] (with 24hr 1:1), employed as assistant reader at [employer], with PMHx of asthma, DM, BPH, urinary retention, hx testicular cancer s/p orchidectomy, GERD, hypothyroidism, HTN, HLD, myopia, PPHx of autism spectrum disorder, moderate intellectual disability; other diagnoses includes bipolar disorder, impulse control disorder, HAVEN, mood disorder NOS, PTSD, ADHD and factitious disorder, with multiple past inpatient psychiatric admissions (as per [employer] staff, most recently 4/2024), hx of cutting himself, charted longstanding hx of endorsing SI (situational: mostly stemming from discontent of living environment or situation) as well as hx of aggression toward [employer] staff; BIBEMS, due to SI and superficial cut to wrist.    On evaluation, similar to past presentations, patient has euthymic affect, is restless, has concrete thought process, and is preoccupied with admission. Upon starting interview, without prompting, patient states "I'm trying to get admitted cause I'm suicidal." He then states "he wants his medications adjusted and his psych doctor can't fix them." When asked what is making him suicidal, patient states "my meds." He then shows this provider his bandaged wrist and states "see I cut myself, so I need to be admitted." He reports he wants to be admitted to his medication can be changed. When asked again if anything has led patient to feel worse, patient states "he misses his fiance." He initially states fiance left him 3 years ago, but then later states it was a few months ago, and that "she kissed another gianni a month ago." He states that he is sad because he misses her and that's what led him to feeling SI and harming self. Patient denied speaking to therapist about this, states he didn't want to speak to therapist, and just wanted to be admitted and change his medication. He initially states hes been cutting himself daily for months, but then later in interview states hes been cutting himself daily for two weeks, and then later stated he last cut himself a week ago on his stomach. He initially states he cut his wrist today because he was feeling suicidal, but then when asked again later, he states "because it relieves stress." and he was sad about ex-fiance. Patient reports poor sleep. He reports good appetite. Patient denies anhedonia (states he enjoys playing video games and going on walks). Patient perseverates on being admitted to the hospital and states if he is not admitted, he'll just go to another hospital to be admitted. When asked if there were any other concerns patient has, he states "oh I'm also hearing voices, they tell me to cut myself too." Of note, this is per patient's baseline, and patient does not appear internally preoccupied. Discussed coping skills when patient is feeling sad, he expressed talking to staff, playing video games, and taking a walk.    Collateral obtained from group home staff by WAGNER Cardoso, see separate note for details. Briefly, staff reports that this is patient's baseline, that patient has a hx of low frustration tolerance, expressing SI, and self harm, and coming to ED with desire to be hospitalized. They report that patient is under constant observation in group home (aside from when he uses bathroom, and if he is in room, they check on him every 5 minutes). Staff state they have a meeting scheduled tomorrow to discuss patient's case given this pattern of behavior and frequent ED visits to see if they can update behavioral plan in any way to mitigate this. Staff deny any safety concerns with patient returning back to group home.  Recommended checking room and bathroom for any sharps as patient was able to get a nail/screw and recommended follow up with outpatient psychiatrist ASAP, staff expressed understanding and is amenable. 34 year old male, single, domiciled at [facility] (with 24hr 1:1), employed as assistant reader at [employer], with PMHx of asthma, DM, BPH, urinary retention, hx testicular cancer s/p orchidectomy, GERD, hypothyroidism, HTN, HLD, myopia, PPHx of autism spectrum disorder, moderate intellectual disability; other diagnoses includes bipolar disorder, impulse control disorder, HAVEN, mood disorder NOS, PTSD, ADHD and factitious disorder, with multiple past inpatient psychiatric admissions (as per [employer] staff, most recently 4/2024), hx of cutting himself, charted longstanding hx of endorsing SI (situational: mostly stemming from discontent of living environment or situation) as well as hx of aggression toward [employer] staff; BIBEMS, due to SI and superficial cut to wrist.    On evaluation, similar to past presentations, patient has euthymic affect, is restless, has concrete thought process, and is preoccupied with admission. Upon starting interview, without prompting, patient states "I'm trying to get admitted cause I'm suicidal." He then states "he wants his medications adjusted and his psych doctor can't fix them." When asked what is making him suicidal, patient states "my meds." He then shows this provider his bandaged wrist and states "see I cut myself, so I need to be admitted." He reports he wants to be admitted to his medication can be changed. When asked again if anything has led patient to feel worse, patient states "he misses his fiance," especially as it was Fe's Day recent. He initially states fiance left him 3 years ago, but then later states it was a few months ago, and that "she kissed another gianni a month ago." He states that he is sad because he misses her and that's what led him to feeling SI and harming self. Patient denied speaking to therapist about this, states he didn't want to speak to therapist, and just wanted to be admitted and change his medication. He initially states hes been cutting himself daily for months, but then later in interview states hes been cutting himself daily for two weeks, and then later stated he last cut himself a week ago on his stomach. He initially states he cut his wrist today because he was feeling suicidal, but then when asked again later, he states "because it relieves stress." and he was sad about ex-fiance. Patient reports poor sleep. He reports good appetite. Patient denies anhedonia (states he enjoys playing video games and going on walks). Patient perseverates on being admitted to the hospital and states if he is not admitted, he'll just go to another hospital to be admitted. When asked if there were any other concerns patient has, he states "oh I'm also hearing voices, they tell me to cut myself too." Of note, this is per patient's baseline, and patient does not appear internally preoccupied. Discussed coping skills when patient is feeling sad, he expressed talking to staff, playing video games, and taking a walk.    Collateral obtained from group home staff by WAGNER Cardoso, see separate note for details. Briefly, staff reports that this is patient's baseline, that patient has a hx of low frustration tolerance, expressing SI, and self harm, and coming to ED with desire to be hospitalized. They report that patient is under constant observation in group home (aside from when he uses bathroom, and if he is in room, they check on him every 5 minutes). Staff state they have a meeting scheduled tomorrow to discuss patient's case given this pattern of behavior and frequent ED visits to see if they can update behavioral plan in any way to mitigate this. Staff deny any safety concerns with patient returning back to group home.  Recommended checking room and bathroom for any sharps as patient was able to get a nail/screw and recommended follow up with outpatient psychiatrist ASAP, staff expressed understanding and is amenable.
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
## 2025-02-21 NOTE — ED ADULT TRIAGE NOTE - ESI TRIAGE ACUITY LEVEL, MLM
Attempted to call pt to verify if she is taking Topiramate. Upon chart review, topiramate was discontinued due to patient stating she does not take it at 1/31/25 appointment  
4

## 2025-02-26 ENCOUNTER — NON-APPOINTMENT (OUTPATIENT)
Age: 35
End: 2025-02-26

## 2025-02-26 NOTE — ED ADULT TRIAGE NOTE - NS ED NURSE AMBULANCES
[facility]: Cancer Care                                                                                          Iliac Crest biopsy [id number] submitted for Sophia per Dr. Plunkett.     Oly Munguia RN  
[employer]

## 2025-02-28 ENCOUNTER — APPOINTMENT (OUTPATIENT)
Dept: HEMATOLOGY ONCOLOGY | Facility: CLINIC | Age: 35
End: 2025-02-28
Payer: MEDICAID

## 2025-02-28 VITALS
RESPIRATION RATE: 15 BRPM | BODY MASS INDEX: 40.29 KG/M2 | DIASTOLIC BLOOD PRESSURE: 85 MMHG | SYSTOLIC BLOOD PRESSURE: 125 MMHG | TEMPERATURE: 97.2 F | HEIGHT: 70 IN | WEIGHT: 281.39 LBS | HEART RATE: 96 BPM | OXYGEN SATURATION: 99 %

## 2025-02-28 DIAGNOSIS — C62.12 MALIGNANT NEOPLASM OF DESCENDED LEFT TESTIS: ICD-10-CM

## 2025-02-28 DIAGNOSIS — C62.91 MALIGNANT NEOPLASM OF RIGHT TESTIS, UNSPECIFIED WHETHER DESCENDED OR UNDESCENDED: ICD-10-CM

## 2025-02-28 PROCEDURE — 99213 OFFICE O/P EST LOW 20 MIN: CPT

## 2025-03-03 NOTE — ED PROVIDER NOTE - CHPI ED SYMPTOMS POS
[place]2025 [place] [facility] [place] H/h 7.9/26.6, 5lnc no home oxygen. EGD planned for today, npo. Bipap q hs and prn. Pt from home with wife and son. Uses a cane or a walker.NEW HD watching for renal recovery- pt states \"they think its' just temporary\", urinary outpt picking up. Pt requested I call his wife about HHC and DME choices. Left wife list in room, she will be later after work. Electronically signed by Akosua Burkett RN-BC on 3/3/2025 at 1:30 PM      The Plan for Transition of Care is related to the following treatment goals: DME, HHC    The Patient and/or patient representative wife Vivi was provided with a choice of provider and agrees with the discharge plan. [x] Yes [] No    Freedom of choice list was provided with basic dialogue that supports the patient's individualized plan of care/goals, treatment preferences and shares the quality data associated with the providers. [x] Yes [] No     syncope/PAIN

## 2025-03-08 ENCOUNTER — EMERGENCY (EMERGENCY)
Facility: HOSPITAL | Age: 35
LOS: 1 days | Discharge: HOME | End: 2025-03-08
Attending: STUDENT IN AN ORGANIZED HEALTH CARE EDUCATION/TRAINING PROGRAM | Admitting: STUDENT IN AN ORGANIZED HEALTH CARE EDUCATION/TRAINING PROGRAM
Payer: MEDICAID

## 2025-03-08 VITALS
SYSTOLIC BLOOD PRESSURE: 118 MMHG | TEMPERATURE: 99 F | RESPIRATION RATE: 18 BRPM | DIASTOLIC BLOOD PRESSURE: 86 MMHG | HEART RATE: 100 BPM | OXYGEN SATURATION: 96 % | HEIGHT: 69 IN | WEIGHT: 279.99 LBS

## 2025-03-08 VITALS
SYSTOLIC BLOOD PRESSURE: 125 MMHG | HEART RATE: 98 BPM | OXYGEN SATURATION: 97 % | DIASTOLIC BLOOD PRESSURE: 84 MMHG | TEMPERATURE: 99 F | RESPIRATION RATE: 18 BRPM

## 2025-03-08 DIAGNOSIS — Z90.79 ACQUIRED ABSENCE OF OTHER GENITAL ORGAN(S): Chronic | ICD-10-CM

## 2025-03-08 PROCEDURE — 94640 AIRWAY INHALATION TREATMENT: CPT

## 2025-03-08 PROCEDURE — 99282 EMERGENCY DEPT VISIT SF MDM: CPT | Mod: 25

## 2025-03-08 PROCEDURE — 99283 EMERGENCY DEPT VISIT LOW MDM: CPT

## 2025-03-08 RX ORDER — FLUTICASONE PROPIONATE 50 UG/1
2 SPRAY, METERED NASAL ONCE
Refills: 0 | Status: COMPLETED | OUTPATIENT
Start: 2025-03-08 | End: 2025-03-08

## 2025-03-08 RX ADMIN — FLUTICASONE PROPIONATE 2 SPRAY(S): 50 SPRAY, METERED NASAL at 16:34

## 2025-03-08 NOTE — ED ADULT NURSE NOTE - PRO INTERPRETER NEED 2
After Visit Summary   2018    Kari Van    MRN: [id number]           Patient Information     Date Of Birth          2018        Visit Information        Provider Department      2018 10:30 AM Donna Ledezma MD Pediatric Neurology        Today's Diagnoses     Seizures (H)          Care Instructions    Pediatric Neurology  Formerly [facility]  Pediatric Specialty Clinic      Pediatric Call Center [phone]  Joy Jain RN Care Coordinator:  [phone]    After Hours and Emergency:  [phone]    Prescription renewals:  Your pharmacy must fax request to [phone]  Please allow 2-3 days for prescriptions to be authorized    Scheduling numbers for common referrals:   [phone]   Neuropsychology:  [phone]    If your physician has ordered an x-ray or MRI, please schedule this test at the , or you may call [phone] to schedule.          Follow-ups after your visit        Follow-up notes from your care team     Return in about 6 months (around 2018).      Who to contact     Please call your clinic at [phone] to:    Ask questions about your health    Make or cancel appointments    Discuss your medicines    Learn about your test results    Speak to your doctor            Additional Information About Your Visit        MyChart Information     [employer] is an electronic gateway that provides easy, online access to your medical records. With [employer], you can request a clinic appointment, read your test results, renew a prescription or communicate with your care team.     To sign up for [employer], please contact your [facility] or call [phone] for assistance.           Care EveryWhere ID     This is your Care EveryWhere ID. This could be used by other organizations to access your [facility] medical records  [id number]        Your Vitals Were     Pulse Height Head Circumference BMI  "(Body Mass Index)          144 2' 3.56\" (70 cm) 44 cm (17.32\") 16.43 kg/m2         Blood Pressure from Last 3 Encounters:   06/12/18 92/65   12/12/17 93/72   10/07/17 97/45    Weight from Last 3 Encounters:   06/12/18 17 lb 12 oz (8.05 kg) (39 %)*   12/27/17 12 lb 13.3 oz (5.82 kg) (24 %)*   12/12/17 11 lb 7.4 oz (5.2 kg) (11 %)*     * Growth percentiles are based on WHO (Girls, 0-2 years) data.              Today, you had the following     No orders found for display         Today's Medication Changes          These changes are accurate as of 6/12/18 10:49 AM.  If you have any questions, ask your nurse or doctor.               These medicines have changed or have updated prescriptions.        Dose/Directions    levETIRAcetam 100 MG/ML solution   Commonly known as:  KEPPRA   This may have changed:  how much to take   Used for:  Seizures (H)        Dose:  50 mg   Take 0.5 mLs (50 mg) by mouth 2 times daily   Quantity:  50 mL   Refills:  11            Where to get your medicines      These medications were sent to [id number] - [place], [place] - [address]  [address] [facility] [address]     Phone:  [phone]     levETIRAcetam 100 MG/ML solution                Primary Care Provider Office Phone # Fax #    Napoleon Kaufman [phone]       [facility] [address] [place] [address]        Equal Access to Services     [facility] AH: Hadii gabriele barragan Sobree, waaxda luqadaha, qaybta kaalmada edin maxwell. So [facility] [phone].    ATENCIÓN: Si habla español, tiene a fong disposición servicios gratuitos de asistencia lingüística. Llame al [phone].    We comply with applicable federal civil rights laws and [place] laws. We do not discriminate on the basis of race, color, national origin, age, disability, sex, sexual orientation, or gender identity.            Thank you!     Thank you for choosing PEDIATRIC NEUROLOGY  " English for your care. Our goal is always to provide you with excellent care. Hearing back from our patients is one way we can continue to improve our services. Please take a few minutes to complete the written survey that you may receive in the mail after your visit with us. Thank you!             Your Updated Medication List - Protect others around you: Learn how to safely use, store and throw away your medicines at www.disposemymeds.org.          This list is accurate as of 6/12/18 10:49 AM.  Always use your most recent med list.                   Brand Name Dispense Instructions for use Diagnosis    levETIRAcetam 100 MG/ML solution    KEPPRA    50 mL    Take 0.5 mLs (50 mg) by mouth 2 times daily    Seizures (H)

## 2025-03-08 NOTE — ED PROVIDER NOTE - PROVIDER TOKENS
PROVIDER:[TOKEN:[[id number]],FOLLOWUP:[4-6 Days]],PROVIDER:[TOKEN:[[id number]],FOLLOWUP:[4-6 Days]],PROVIDER:[TOKEN:[[id number]],FOLLOWUP:[4-6 Days]]

## 2025-03-08 NOTE — ED PROVIDER NOTE - IV ALTEPLASE INCLUSION HIDDEN
show [Outpatient] : Outpatient [Ambulatory] : Patient is ambulatory. [THIS CHAMBER HAS BEEN CLEANED / DISINFECTED] : This chamber has been cleaned / disinfected according to local and hospital policy and procedure prior to this treatment. [Patient demonstrated and verbalized proper technique for using air break mask] : Patient demonstrated and verbalized proper technique for using air break mask [Patient educated on the risks of SMOKING prior to HBOT with understanding] : Patient educated on the risks of SMOKING prior to HBOT with understanding [Patient educated on the risks of CONSUMING ALCOHOL prior to HBOT with understanding] : Patient educated on the risks of CONSUMING ALCOHOL prior to HBOT with understanding [100% Cotton] : 100% cotton [Empty all pockets] : empty all pockets [No hair oils, wigs, hairpieces, pins] : no hair oils, wigs, hairpieces, pins  [Pre tx medications] : pre tx medications  [No make-up, creams] : no make-up, creams  [No jewelry] : no jewelry  [No matches, cigarettes, lighters] : no matches, cigarettes, lighters  [Hearing aid removed] : hearing aid removed [Dentures removed] : dentures removed [Ground bracelet on pt's wrist] : ground bracelet on pt's wrist  [Contacts removed] : contacts removed  [Remove nail polish] : remove nail polish  [No reading material] : no reading material  [Bra, undergarments removed] : bra, undergarments removed  [No contraindicated dressings] : no contraindicated dressings [Ground Wire - VISUAL Verification - Intact/Free of Obstruction] : Ground Wire - VISUAL Verification - Intact/Free of Obstruction  [Ground Continuity - Verified < 1 ohm w/ Wrist Strap Gloria] : Ground Continuity - Verified < 1 ohm w/ Wrist Strap Gloria [Number: ___] : Number: [unfilled] [Diagnosis: ___] : Diagnosis: [unfilled] [____] : Post-Dive: Time - [unfilled] [___] : Post-Dive: Value - [unfilled] mg/dL [Clear all fields] : clear all fields [] : No [FreeTextEntry4] : 100 [FreeTextEntry6] : 08:08 [FreeTextEntry8] : 08:18 [de-identified] : 08:48 [de-identified] : 08:53 [de-identified] : 09:23 [de-identified] : 09:28 [de-identified] : 09:58 [de-identified] : 10:08 [de-identified] : 120

## 2025-03-08 NOTE — ED PROVIDER NOTE - PATIENT PORTAL LINK FT
You can access the FollowMyHealth Patient Portal offered by [facility] by registering at the following website: [email or web address]. By joining [employer]’s FollowMyHealth portal, you will also be able to view your health information using other applications (apps) compatible with our system.

## 2025-03-08 NOTE — ED PROVIDER NOTE - CARE PROVIDER_API CALL
Alec Etienne  Otolaryngology  [address] [place] [address]  Phone: [phone]  Fax: [phone]  Follow Up Time: 4-6 Days    Basim Salomon  Otolaryngology  [address] [facility]  [place] [address]  Phone: [phone]  Fax: [phone]  Follow Up Time: 4-6 Days    Braydon Real  Otolaryngology  [address] [place] [id number]  Phone: [phone]  Fax: [phone]  Follow Up Time: 4-6 Days

## 2025-03-08 NOTE — ED PROVIDER NOTE - CLINICAL SUMMARY MEDICAL DECISION MAKING FREE TEXT BOX
Here with 1 month of progressive decreasing hearing. aid states patient complaining of pain today. no headache, fevers, chills, nausea, vomiting. On exam, no infection, decreased hearing noted, +post nasal drip and congestion. likely eustachian tube dysfuntion from cogestion. will start Flonase and discharge with ENT follow up. Here with 1 month of progressive decreasing hearing. aid states patient complaining of pain today. no headache, fevers, chills, nausea, vomiting. On exam, no infection, decreased hearing noted, +post nasal drip and congestion. likely eustachian tube dysfunction from congestion. will start Flonase and discharge with ENT follow up.

## 2025-03-08 NOTE — ED PROVIDER NOTE - CARE PROVIDERS DIRECT ADDRESSES
,DirectAddress_Unknown,[email or web address],DirectAddress_Unknown

## 2025-03-08 NOTE — ED ADULT NURSE NOTE - FINAL NURSING ELECTRONIC SIGNATURE
To confirm, Your doctor has instructed you that surgery is scheduled for: 2/10/25    Please report to [facility], Registration the morning of surgery. You must check-in and receive a wristband before going to your procedure.  [address] [place] [address] [place] [address]    Pre-Op will call the afternoon prior to surgery between 1:00 and 6:00 PM with the final arrival time.  Phone number: [phone]    PLEASE NOTE:  The surgery schedule has many variables which may affect the time of your surgery case.  Family members should be available if your surgery time changes.  Plan to be here the day of your procedure between 4-6 hours.    MEDICATIONS:  TAKE ONLY THESE MEDICATIONS WITH A SMALL SIP OF WATER THE MORNING OF YOUR PROCEDURE:    SEE MED LIST          DO NOT TAKE THESE MEDICATIONS 5-7 DAYS PRIOR to your procedure or per your surgeon's request:   ASPIRIN, ALEVE, ADVIL, IBUPROFEN, FISH OIL VITAMIN E, HERBALS  (May take Tylenol)    ONLY if you are prescribed any types of blood thinners such as:  Aspirin, Coumadin, Plavix, Pradaxa, Xarelto, Aggrenox, Effient, Eliquis, Savasya, Brilinta, or any other, ask your surgeon whether you should stop taking them and how long before surgery you should stop.  You may also need to verify with the prescribing physician if it is ok to stop your medication.      INSTRUCTIONS IMPORTANT!!  Do not eat or drink anything between midnight and the time of your procedure- this includes gum, mints, and candy.  EXCEPT: you may have clear liquids such as:  WATER, BLACK COFFEE, UNSWEET TEA, OR GATORADE (NO RED OR PURPLE) UP TO 2 HOURS PRIOR TO YOUR ARRIVAL TIME.  Do not smoke or drink alcoholic beverages 24 hours prior to your procedure.  Shower the night before AND the morning of your procedure with a Chlorhexidine wash such as Hibiclens or Dial antibacterial soap from the neck down.  Do not get it on your face or in your eyes.  You may use your own shampoo and face wash. This  helps your skin to be as bacteria free as possible.    If you wear contact lenses, dentures, hearing aids or glasses, bring a container to put them in during surgery and give to a family member for safe keeping.  Please leave all jewelry, piercing's and valuables at home. You must remove your false eyelashes prior to surgery.    DO NOT remove hair from the surgery site.  Do not shave the incision site unless you are given specific instructions to do so.    ONLY if you have been diagnosed with sleep apnea please bring your C-PAP machine.  ONLY if you wear home oxygen please bring your portable oxygen tank the day of your procedure.  ONLY if you have a history of OPEN HEART SURGERY you will need a clearance from your Cardiologist per Anesthesia.      ONLY for patients requiring bowel prep, written instructions will be given by your doctor's office.  ONLY if you have a neuro stimulator, please bring the controller with you the morning of surgery  ONLY if a type and screen test is needed before surgery, please return:  If your doctor has scheduled you for an overnight stay, bring a small overnight bag with any personal items you need.  Make arrangements in advance for transportation home by a responsible adult. You can not go home in an uber or a cab per hospital policy.  It is not safe to drive a vehicle during the 24 hours after anesthesia.          All  facilities and properties are tobacco free.  Smoking is NOT allowed.   If you have any questions about these instructions, call Pre-Op Admit  Nursing at [phone] or the Pre-Op Day Surgery Unit at [phone].                       08-Mar-2025 16:37

## 2025-03-08 NOTE — ED ADULT NURSE NOTE - NSFALLHARMRISKINTERV_ED_ALL_ED

## 2025-03-08 NOTE — ED PROVIDER NOTE - PHYSICAL EXAMINATION
Vital signs as available reviewed.  General:  No acute distress.  Head:  Normocephalic, atraumatic.  Eyes:  Conjunctiva pink, no icterus.  ENMT: bilateral TMs normal- no erythema, no bulging or retraction. mild fluid behind TMs bilaterally. oropharynx moist without exudate. + boggy turbinates.  Cardiovascular:  Regular rate, no obvious murmur.  Respiratory:  Clear to auscultation, good air entry bilaterally.  Abdomen:  Soft, non-tender.  Musculoskeletal:  No obvious deformity.  Neurologic: Alert and oriented, moving all extremities.  Skin:  Warm and dry.

## 2025-03-08 NOTE — ED PROVIDER NOTE - NSFOLLOWUPINSTRUCTIONS_ED_ALL_ED_FT
Please follow up with your Primary Care Physician and any specialists as discussed.  Please use provided Flonase 1 spray each nostril twice a day.  Please take your medications as prescribed and or instructed.  If your symptoms persist or worsen, please seek care. Either return to the Emergency Department, go to urgent care or see your primary care doctor.  Please refer to general information and instructions attached or below:    Hearing Loss  Hearing loss is a partial or total loss of the ability to hear. This can be temporary or permanent, and it can happen in one or both ears.    There are two types of hearing loss. You can have just one type or both types. You may have a problem with:  Damage to your hearing nerves (sensorineural hearing loss). This type of hearing loss is more likely to be permanent. A hearing aid is often the best treatment.  Sound getting to your inner ear (conductive hearing loss). This type of hearing loss can usually be treated medically or surgically.  Medical care is necessary to treat hearing loss properly and to prevent the condition from getting worse. Your hearing may partially or completely come back, depending on what caused your hearing loss and how severe it is. In some cases, hearing loss is permanent.    What are the causes?  Common causes of hearing loss include:  Too much wax in the ear canal.  Infection of the ear canal or middle ear.  Fluid in the middle ear.  Injury to the ear or surrounding area.  An object stuck in the ear.  A history of prolonged exposure to loud sounds, such as music.  Less common causes of hearing loss include:  Tumors in the ear.  Viral or bacterial infections, such as meningitis.  A hole in the eardrum (perforated eardrum).  Problems with the hearing nerve that sends signals between the brain and the ear.  Certain medicines.  What are the signs or symptoms?  Symptoms of this condition may include:  Difficulty telling the difference between sounds.  Difficulty following a conversation when there is background noise.  Lack of response to sounds in your environment. This may be most noticeable when you do not respond to startling sounds.  Needing to turn up the volume on the television, radio, or other devices.  Ringing in the ears.  Dizziness.  How is this diagnosed?  A health care provider checks a person's ear using an otoscope. A close-up of the ear and otoscope is also shown.  This condition is diagnosed based on:  A physical exam.  A hearing test (audiometry). The test will be performed by a hearing specialist (audiologist).  Imaging tests, such as an MRI or CT scan.  You may also be referred to an ear, nose, and throat (ENT) specialist (otolaryngologist).    How is this treated?  Treatment for hearing loss may include:  Earwax removal.  Medicines to treat or prevent infection (antibiotics).  Medicines to reduce inflammation (corticosteroids).  Hearing aids or assistive listening devices.  Follow these instructions at home:  If you were prescribed an antibiotic medicine, take it as told by your health care provider. Do not stop taking the antibiotic even if you start to feel better.  Take over-the-counter and prescription medicines only as told by your health care provider.  Avoid loud noises. Use hearing protection if you are exposed to loud noises or work in a noisy environment.  Return to your normal activities as told by your health care provider. Ask your health care provider what activities are safe for you.  Keep all follow-up visits. This is important.  Contact a health care provider if:  You feel dizzy.  You develop new symptoms.  You vomit or feel nauseous.  You have a fever.  Get help right away if:  You develop sudden changes in your vision.  You have severe ear pain.  You have new or increased weakness.  You have a severe headache.  Summary  Hearing loss is a decreased ability to hear sounds around you. It can be temporary or permanent.  Treatment will depend on the cause of your hearing loss. It may include earwax removal, medicines, or a hearing aid.  Your hearing may partially or completely come back, depending on what caused your hearing loss and how severe it is.  Keep all follow-up visits. This is important.  This information is not intended to replace advice given to you by your health care provider. Make sure you discuss any questions you have with your health care provider.

## 2025-03-08 NOTE — ED PROVIDER NOTE - OBJECTIVE STATEMENT
Here complaining of worsening decreased hearing from left ear with associated discomfort. No fevers, chills, nausea, vomiting, headache.

## 2025-03-11 ENCOUNTER — NON-APPOINTMENT (OUTPATIENT)
Age: 35
End: 2025-03-11

## 2025-03-12 ENCOUNTER — EMERGENCY (EMERGENCY)
Facility: HOSPITAL | Age: 35
LOS: 0 days | Discharge: HOME | End: 2025-03-13
Attending: STUDENT IN AN ORGANIZED HEALTH CARE EDUCATION/TRAINING PROGRAM
Payer: MEDICAID

## 2025-03-12 VITALS
RESPIRATION RATE: 18 BRPM | HEART RATE: 118 BPM | SYSTOLIC BLOOD PRESSURE: 142 MMHG | DIASTOLIC BLOOD PRESSURE: 95 MMHG | WEIGHT: 281.53 LBS | TEMPERATURE: 98 F | OXYGEN SATURATION: 98 %

## 2025-03-12 DIAGNOSIS — Z90.79 ACQUIRED ABSENCE OF OTHER GENITAL ORGAN(S): Chronic | ICD-10-CM

## 2025-03-12 LAB
ALBUMIN SERPL ELPH-MCNC: 3.5 G/DL — SIGNIFICANT CHANGE UP (ref 3.3–5)
ALP SERPL-CCNC: 68 U/L — SIGNIFICANT CHANGE UP (ref 40–120)
ALT FLD-CCNC: 42 U/L — SIGNIFICANT CHANGE UP (ref 12–78)
AMPHET UR-MCNC: NEGATIVE — SIGNIFICANT CHANGE UP
ANION GAP SERPL CALC-SCNC: 8 MMOL/L — SIGNIFICANT CHANGE UP (ref 5–17)
APAP SERPL-MCNC: <2 UG/ML — LOW (ref 10–30)
APPEARANCE UR: CLEAR — SIGNIFICANT CHANGE UP
AST SERPL-CCNC: 15 U/L — SIGNIFICANT CHANGE UP (ref 15–37)
BACTERIA # UR AUTO: NEGATIVE /HPF — SIGNIFICANT CHANGE UP
BARBITURATES UR SCN-MCNC: NEGATIVE — SIGNIFICANT CHANGE UP
BASOPHILS # BLD AUTO: 0.03 K/UL — SIGNIFICANT CHANGE UP (ref 0–0.2)
BASOPHILS NFR BLD AUTO: 0.3 % — SIGNIFICANT CHANGE UP (ref 0–2)
BENZODIAZ UR-MCNC: NEGATIVE — SIGNIFICANT CHANGE UP
BILIRUB SERPL-MCNC: 0.3 MG/DL — SIGNIFICANT CHANGE UP (ref 0.2–1.2)
BILIRUB UR-MCNC: ABNORMAL
BUN SERPL-MCNC: 12 MG/DL — SIGNIFICANT CHANGE UP (ref 7–23)
CALCIUM SERPL-MCNC: 9.2 MG/DL — SIGNIFICANT CHANGE UP (ref 8.5–10.1)
CAST: 0 /LPF — SIGNIFICANT CHANGE UP (ref 0–4)
CHLORIDE SERPL-SCNC: 105 MMOL/L — SIGNIFICANT CHANGE UP (ref 96–108)
CO2 SERPL-SCNC: 25 MMOL/L — SIGNIFICANT CHANGE UP (ref 22–31)
COCAINE METAB.OTHER UR-MCNC: NEGATIVE — SIGNIFICANT CHANGE UP
COLOR SPEC: SIGNIFICANT CHANGE UP
CREAT SERPL-MCNC: 1.08 MG/DL — SIGNIFICANT CHANGE UP (ref 0.5–1.3)
DIFF PNL FLD: NEGATIVE — SIGNIFICANT CHANGE UP
EGFR: 92 ML/MIN/1.73M2 — SIGNIFICANT CHANGE UP
EOSINOPHIL # BLD AUTO: 0.08 K/UL — SIGNIFICANT CHANGE UP (ref 0–0.5)
EOSINOPHIL NFR BLD AUTO: 0.9 % — SIGNIFICANT CHANGE UP (ref 0–6)
ETHANOL SERPL-MCNC: <10 MG/DL — SIGNIFICANT CHANGE UP (ref 0–10)
FENTANYL UR QL SCN: NEGATIVE — SIGNIFICANT CHANGE UP
FLUAV AG NPH QL: SIGNIFICANT CHANGE UP
FLUBV AG NPH QL: SIGNIFICANT CHANGE UP
GLUCOSE SERPL-MCNC: 123 MG/DL — HIGH (ref 70–99)
GLUCOSE UR QL: NEGATIVE MG/DL — SIGNIFICANT CHANGE UP
HCT VFR BLD CALC: 44.5 % — SIGNIFICANT CHANGE UP (ref 39–50)
HGB BLD-MCNC: 13.8 G/DL — SIGNIFICANT CHANGE UP (ref 13–17)
IMM GRANULOCYTES # BLD AUTO: 0.05 K/UL — SIGNIFICANT CHANGE UP (ref 0–0.07)
IMM GRANULOCYTES NFR BLD AUTO: 0.5 % — SIGNIFICANT CHANGE UP (ref 0–0.9)
KETONES UR-MCNC: 15 MG/DL
LEUKOCYTE ESTERASE UR-ACNC: NEGATIVE — SIGNIFICANT CHANGE UP
LYMPHOCYTES # BLD AUTO: 2.55 K/UL — SIGNIFICANT CHANGE UP (ref 1–3.3)
LYMPHOCYTES NFR BLD AUTO: 27.7 % — SIGNIFICANT CHANGE UP (ref 13–44)
MCHC RBC-ENTMCNC: 24.5 PG — LOW (ref 27–34)
MCHC RBC-ENTMCNC: 31 G/DL — LOW (ref 32–36)
MCV RBC AUTO: 78.9 FL — LOW (ref 80–100)
METHADONE UR-MCNC: NEGATIVE — SIGNIFICANT CHANGE UP
MONOCYTES # BLD AUTO: 0.7 K/UL — SIGNIFICANT CHANGE UP (ref 0–0.9)
MONOCYTES NFR BLD AUTO: 7.6 % — SIGNIFICANT CHANGE UP (ref 2–14)
NEUTROPHILS # BLD AUTO: 5.81 K/UL — SIGNIFICANT CHANGE UP (ref 1.8–7.4)
NEUTROPHILS NFR BLD AUTO: 63 % — SIGNIFICANT CHANGE UP (ref 43–77)
NITRITE UR-MCNC: NEGATIVE — SIGNIFICANT CHANGE UP
NRBC # BLD AUTO: 0 K/UL — SIGNIFICANT CHANGE UP (ref 0–0)
NRBC # FLD: 0 K/UL — SIGNIFICANT CHANGE UP (ref 0–0)
NRBC BLD AUTO-RTO: 0 /100 WBCS — SIGNIFICANT CHANGE UP (ref 0–0)
OPIATES UR-MCNC: NEGATIVE — SIGNIFICANT CHANGE UP
PCP SPEC-MCNC: SIGNIFICANT CHANGE UP
PCP UR-MCNC: NEGATIVE — SIGNIFICANT CHANGE UP
PH UR: 5.5 — SIGNIFICANT CHANGE UP (ref 5–8)
PLATELET # BLD AUTO: 208 K/UL — SIGNIFICANT CHANGE UP (ref 150–400)
PMV BLD: 11.5 FL — SIGNIFICANT CHANGE UP (ref 7–13)
POTASSIUM SERPL-MCNC: 4.1 MMOL/L — SIGNIFICANT CHANGE UP (ref 3.5–5.3)
POTASSIUM SERPL-SCNC: 4.1 MMOL/L — SIGNIFICANT CHANGE UP (ref 3.5–5.3)
PROT SERPL-MCNC: 7.3 GM/DL — SIGNIFICANT CHANGE UP (ref 6–8.3)
PROT UR-MCNC: 30 MG/DL
RBC # BLD: 5.64 M/UL — SIGNIFICANT CHANGE UP (ref 4.2–5.8)
RBC # FLD: 15.4 % — HIGH (ref 10.3–14.5)
RBC CASTS # UR COMP ASSIST: 2 /HPF — SIGNIFICANT CHANGE UP (ref 0–4)
RSV RNA NPH QL NAA+NON-PROBE: SIGNIFICANT CHANGE UP
SALICYLATES SERPL-MCNC: <1.7 MG/DL — LOW (ref 2.8–20)
SARS-COV-2 RNA SPEC QL NAA+PROBE: SIGNIFICANT CHANGE UP
SODIUM SERPL-SCNC: 138 MMOL/L — SIGNIFICANT CHANGE UP (ref 135–145)
SP GR SPEC: >1.03 — HIGH (ref 1–1.03)
SQUAMOUS # UR AUTO: 8 /HPF — HIGH (ref 0–5)
THC UR QL: NEGATIVE — SIGNIFICANT CHANGE UP
TSH SERPL-MCNC: 3.82 UU/ML — SIGNIFICANT CHANGE UP (ref 0.34–4.82)
UROBILINOGEN FLD QL: 1 MG/DL — SIGNIFICANT CHANGE UP (ref 0.2–1)
WBC # BLD: 9.22 K/UL — SIGNIFICANT CHANGE UP (ref 3.8–10.5)
WBC # FLD AUTO: 9.22 K/UL — SIGNIFICANT CHANGE UP (ref 3.8–10.5)
WBC UR QL: 2 /HPF — SIGNIFICANT CHANGE UP (ref 0–5)

## 2025-03-12 PROCEDURE — 99285 EMERGENCY DEPT VISIT HI MDM: CPT

## 2025-03-12 PROCEDURE — 36415 COLL VENOUS BLD VENIPUNCTURE: CPT

## 2025-03-12 PROCEDURE — 94640 AIRWAY INHALATION TREATMENT: CPT

## 2025-03-12 PROCEDURE — 85025 COMPLETE CBC W/AUTO DIFF WBC: CPT

## 2025-03-12 PROCEDURE — 71046 X-RAY EXAM CHEST 2 VIEWS: CPT | Mod: 26

## 2025-03-12 PROCEDURE — 84443 ASSAY THYROID STIM HORMONE: CPT

## 2025-03-12 PROCEDURE — 93005 ELECTROCARDIOGRAM TRACING: CPT

## 2025-03-12 PROCEDURE — 71046 X-RAY EXAM CHEST 2 VIEWS: CPT

## 2025-03-12 PROCEDURE — 93010 ELECTROCARDIOGRAM REPORT: CPT

## 2025-03-12 PROCEDURE — 99285 EMERGENCY DEPT VISIT HI MDM: CPT | Mod: 25

## 2025-03-12 PROCEDURE — 80307 DRUG TEST PRSMV CHEM ANLYZR: CPT

## 2025-03-12 PROCEDURE — 81001 URINALYSIS AUTO W/SCOPE: CPT

## 2025-03-12 PROCEDURE — 80053 COMPREHEN METABOLIC PANEL: CPT

## 2025-03-12 PROCEDURE — 0241U: CPT

## 2025-03-12 NOTE — ED PROVIDER NOTE - CLINICAL SUMMARY MEDICAL DECISION MAKING FREE TEXT BOX
34-year-old male presenting for shortness of breath associated with sore throat currently being treated for thrush and hallucinations.  Throat appears to be resolving, will obtain chest x-ray to rule out any pulmonary pathology however this is less likely.  For auditory hallucinations, will perform medical screening examination including blood work and urinalysis, psych consult for ultimate disposition.

## 2025-03-12 NOTE — ED ADULT TRIAGE NOTE - CHIEF COMPLAINT QUOTE
Pt ambulatory to ED from [facility] with staff member w c/o auditory hallucinations telling him to "cut my throat". States he is also having thoughts of cutting himself. Also endorsing thoughts of punching people in his facility. While in route to hospital pt states he developed sob, respirations even and unlabored, speaking in complete sentences, O2 sat 97% on RA. states he has a sore throat starting Monday. Pt A&O4 in triage, calm and cooperative,  staff notified of pt status, 1:1 initiated for safety, sent for ekg.

## 2025-03-12 NOTE — ED PROVIDER NOTE - OBJECTIVE STATEMENT
34-year-old male PMH asthma, autism, factitious disorder, HLD, hypothyroid, impulse control disorder, intellectual disability, diabetes, obesity, presents to the emergency department for multiple complaints.  Patient's main complaint is a feeling of shortness of breath over the past 3 weeks with a nonproductive cough and sore throat.  Staff at bedside states patient is currently being treated with nystatin for thrush.  Patient also notes hearing voices, states they are telling him to cut himself.  Has not acted on these hallucinations.  States he has been taking his medications as prescribed.  He denies SI/HI, denies drug or alcohol use.
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
## 2025-03-12 NOTE — ED PROVIDER NOTE - PHYSICAL EXAMINATION
GENERAL: A&Ox4, non-toxic appearing, no acute distress  HEENT: NCAT, EOMI, oral mucosa moist, scant white coating of the tongue and posterior pharynx  RESP: no respiratory distress, CTAB, no wheezes/rhonchi/rales, speaking in full sentences  CV: RRR, no murmurs/rubs/gallops  ABDOMEN: soft, non-tender, non-distended, no guarding, no rebound tenderness  MSK: no visible deformities  NEURO: no focal sensory or motor deficits, CN 2-12 grossly intact  SKIN: warm, normal color, well perfused, no rash, previous lacerations to the upper extremities  PSYCH: normal affect

## 2025-03-12 NOTE — ED PROVIDER NOTE - PATIENT PORTAL LINK FT
You can access the FollowMyHealth Patient Portal offered by [facility] by registering at the following website: [email or web address]. By joining [employer]’s FollowMyHealth portal, you will also be able to view your health information using other applications (apps) compatible with our system.

## 2025-03-12 NOTE — ED PROVIDER NOTE - NSFOLLOWUPINSTRUCTIONS_ED_ALL_ED_FT
Continue all meds as previously prescribed.  OK to return to program and all activities.  See outpatient primary care doctor and psychiatrist within 1 week.

## 2025-03-12 NOTE — ED ADULT TRIAGE NOTE - NS_BH TRG QUESTION8_ED_ALL_ED
Depression (without Suicidality or Psychosis)/Rachel (includes Bipolar Disorder)/Anxiety (includes Panic, OCD)/Behavioral Problems (includes ADHD, Oppositionality)

## 2025-03-12 NOTE — ED PROVIDER NOTE - PROGRESS NOTE DETAILS
All labs personally reviewed and are nonactionable.  Patient medically cleared for psychiatric admission or transfer.  Spoke with telepsych, team will to consult list.  They are requesting patient's medication list be faxed to them at [phone].  Staff member at bedside states previous staff member left with patient's medication list, will attempt to contact to return to the ED. [place] Received signout from Dr Bender to follow up psych consult.  Dispo per psych.  Telepsych attending cleared patient ; ok to discharge back to group home.  Group home staff willing and able to bring patient back to facility.

## 2025-03-13 ENCOUNTER — EMERGENCY (EMERGENCY)
Facility: HOSPITAL | Age: 35
LOS: 1 days | Discharge: HOME | End: 2025-03-13
Attending: EMERGENCY MEDICINE | Admitting: EMERGENCY MEDICINE
Payer: MEDICAID

## 2025-03-13 VITALS
DIASTOLIC BLOOD PRESSURE: 91 MMHG | TEMPERATURE: 98 F | RESPIRATION RATE: 16 BRPM | OXYGEN SATURATION: 98 % | HEIGHT: 69 IN | SYSTOLIC BLOOD PRESSURE: 133 MMHG | HEART RATE: 67 BPM | WEIGHT: 190.04 LBS

## 2025-03-13 VITALS
DIASTOLIC BLOOD PRESSURE: 86 MMHG | HEART RATE: 107 BPM | OXYGEN SATURATION: 98 % | SYSTOLIC BLOOD PRESSURE: 131 MMHG | TEMPERATURE: 98 F | RESPIRATION RATE: 18 BRPM

## 2025-03-13 VITALS
DIASTOLIC BLOOD PRESSURE: 82 MMHG | RESPIRATION RATE: 16 BRPM | OXYGEN SATURATION: 99 % | TEMPERATURE: 98 F | HEART RATE: 79 BPM | SYSTOLIC BLOOD PRESSURE: 118 MMHG

## 2025-03-13 DIAGNOSIS — F41.1 GENERALIZED ANXIETY DISORDER: ICD-10-CM

## 2025-03-13 DIAGNOSIS — J45.909 UNSPECIFIED ASTHMA, UNCOMPLICATED: ICD-10-CM

## 2025-03-13 DIAGNOSIS — Z90.79 ACQUIRED ABSENCE OF OTHER GENITAL ORGAN(S): Chronic | ICD-10-CM

## 2025-03-13 DIAGNOSIS — E03.9 HYPOTHYROIDISM, UNSPECIFIED: ICD-10-CM

## 2025-03-13 DIAGNOSIS — R06.02 SHORTNESS OF BREATH: ICD-10-CM

## 2025-03-13 DIAGNOSIS — F43.10 POST-TRAUMATIC STRESS DISORDER, UNSPECIFIED: ICD-10-CM

## 2025-03-13 DIAGNOSIS — F84.0 AUTISTIC DISORDER: ICD-10-CM

## 2025-03-13 DIAGNOSIS — J02.9 ACUTE PHARYNGITIS, UNSPECIFIED: ICD-10-CM

## 2025-03-13 DIAGNOSIS — Z91.030 BEE ALLERGY STATUS: ICD-10-CM

## 2025-03-13 DIAGNOSIS — Z86.39 PERSONAL HISTORY OF OTHER ENDOCRINE, NUTRITIONAL AND METABOLIC DISEASE: ICD-10-CM

## 2025-03-13 DIAGNOSIS — E78.5 HYPERLIPIDEMIA, UNSPECIFIED: ICD-10-CM

## 2025-03-13 DIAGNOSIS — R33.8 OTHER RETENTION OF URINE: ICD-10-CM

## 2025-03-13 DIAGNOSIS — F63.9 IMPULSE DISORDER, UNSPECIFIED: ICD-10-CM

## 2025-03-13 DIAGNOSIS — E11.9 TYPE 2 DIABETES MELLITUS WITHOUT COMPLICATIONS: ICD-10-CM

## 2025-03-13 DIAGNOSIS — H52.10 MYOPIA, UNSPECIFIED EYE: ICD-10-CM

## 2025-03-13 DIAGNOSIS — K21.9 GASTRO-ESOPHAGEAL REFLUX DISEASE WITHOUT ESOPHAGITIS: ICD-10-CM

## 2025-03-13 DIAGNOSIS — F79 UNSPECIFIED INTELLECTUAL DISABILITIES: ICD-10-CM

## 2025-03-13 DIAGNOSIS — R44.0 AUDITORY HALLUCINATIONS: ICD-10-CM

## 2025-03-13 DIAGNOSIS — N40.1 BENIGN PROSTATIC HYPERPLASIA WITH LOWER URINARY TRACT SYMPTOMS: ICD-10-CM

## 2025-03-13 DIAGNOSIS — F68.10 FACTITIOUS DISORDER IMPOSED ON SELF, UNSPECIFIED: ICD-10-CM

## 2025-03-13 DIAGNOSIS — Z88.8 ALLERGY STATUS TO OTHER DRUGS, MEDICAMENTS AND BIOLOGICAL SUBSTANCES: ICD-10-CM

## 2025-03-13 LAB
ALBUMIN SERPL ELPH-MCNC: 3.4 G/DL — SIGNIFICANT CHANGE UP (ref 3.3–5)
ALP SERPL-CCNC: 72 U/L — SIGNIFICANT CHANGE UP (ref 40–120)
ALT FLD-CCNC: 42 U/L — SIGNIFICANT CHANGE UP (ref 12–78)
ANION GAP SERPL CALC-SCNC: 8 MMOL/L — SIGNIFICANT CHANGE UP (ref 5–17)
AST SERPL-CCNC: 16 U/L — SIGNIFICANT CHANGE UP (ref 15–37)
BASOPHILS # BLD AUTO: 0.02 K/UL — SIGNIFICANT CHANGE UP (ref 0–0.2)
BASOPHILS NFR BLD AUTO: 0.2 % — SIGNIFICANT CHANGE UP (ref 0–2)
BILIRUB SERPL-MCNC: 0.2 MG/DL — SIGNIFICANT CHANGE UP (ref 0.2–1.2)
BUN SERPL-MCNC: 14 MG/DL — SIGNIFICANT CHANGE UP (ref 7–23)
CALCIUM SERPL-MCNC: 9.2 MG/DL — SIGNIFICANT CHANGE UP (ref 8.5–10.1)
CHLORIDE SERPL-SCNC: 105 MMOL/L — SIGNIFICANT CHANGE UP (ref 96–108)
CO2 SERPL-SCNC: 24 MMOL/L — SIGNIFICANT CHANGE UP (ref 22–31)
CREAT SERPL-MCNC: 1.1 MG/DL — SIGNIFICANT CHANGE UP (ref 0.5–1.3)
EGFR: 90 ML/MIN/1.73M2 — SIGNIFICANT CHANGE UP
EGFR: 90 ML/MIN/1.73M2 — SIGNIFICANT CHANGE UP
EOSINOPHIL # BLD AUTO: 0.02 K/UL — SIGNIFICANT CHANGE UP (ref 0–0.5)
EOSINOPHIL NFR BLD AUTO: 0.2 % — SIGNIFICANT CHANGE UP (ref 0–6)
GLUCOSE SERPL-MCNC: 156 MG/DL — HIGH (ref 70–99)
HCT VFR BLD CALC: 42.1 % — SIGNIFICANT CHANGE UP (ref 39–50)
HGB BLD-MCNC: 13.6 G/DL — SIGNIFICANT CHANGE UP (ref 13–17)
IMM GRANULOCYTES NFR BLD AUTO: 0.5 % — SIGNIFICANT CHANGE UP (ref 0–0.9)
LYMPHOCYTES # BLD AUTO: 1.79 K/UL — SIGNIFICANT CHANGE UP (ref 1–3.3)
LYMPHOCYTES # BLD AUTO: 18.9 % — SIGNIFICANT CHANGE UP (ref 13–44)
MCHC RBC-ENTMCNC: 24.7 PG — LOW (ref 27–34)
MCHC RBC-ENTMCNC: 32.3 G/DL — SIGNIFICANT CHANGE UP (ref 32–36)
MCV RBC AUTO: 76.5 FL — LOW (ref 80–100)
MONOCYTES # BLD AUTO: 0.86 K/UL — SIGNIFICANT CHANGE UP (ref 0–0.9)
MONOCYTES NFR BLD AUTO: 9.1 % — SIGNIFICANT CHANGE UP (ref 2–14)
NEUTROPHILS # BLD AUTO: 6.72 K/UL — SIGNIFICANT CHANGE UP (ref 1.8–7.4)
NEUTROPHILS NFR BLD AUTO: 71.1 % — SIGNIFICANT CHANGE UP (ref 43–77)
NRBC BLD AUTO-RTO: 0 /100 WBCS — SIGNIFICANT CHANGE UP (ref 0–0)
PLATELET # BLD AUTO: 192 K/UL — SIGNIFICANT CHANGE UP (ref 150–400)
POTASSIUM SERPL-MCNC: 3.7 MMOL/L — SIGNIFICANT CHANGE UP (ref 3.5–5.3)
POTASSIUM SERPL-SCNC: 3.7 MMOL/L — SIGNIFICANT CHANGE UP (ref 3.5–5.3)
PROT SERPL-MCNC: 7.1 G/DL — SIGNIFICANT CHANGE UP (ref 6–8.3)
RBC # BLD: 5.5 M/UL — SIGNIFICANT CHANGE UP (ref 4.2–5.8)
RBC # FLD: 15.5 % — HIGH (ref 10.3–14.5)
SODIUM SERPL-SCNC: 137 MMOL/L — SIGNIFICANT CHANGE UP (ref 135–145)
TROPONIN I, HIGH SENSITIVITY RESULT: 7.2 NG/L — SIGNIFICANT CHANGE UP
WBC # BLD: 9.46 K/UL — SIGNIFICANT CHANGE UP (ref 3.8–10.5)
WBC # FLD AUTO: 9.46 K/UL — SIGNIFICANT CHANGE UP (ref 3.8–10.5)

## 2025-03-13 PROCEDURE — 80053 COMPREHEN METABOLIC PANEL: CPT

## 2025-03-13 PROCEDURE — 84484 ASSAY OF TROPONIN QUANT: CPT

## 2025-03-13 PROCEDURE — 90792 PSYCH DIAG EVAL W/MED SRVCS: CPT | Mod: 95

## 2025-03-13 PROCEDURE — 99285 EMERGENCY DEPT VISIT HI MDM: CPT

## 2025-03-13 PROCEDURE — 85025 COMPLETE CBC W/AUTO DIFF WBC: CPT

## 2025-03-13 PROCEDURE — 71045 X-RAY EXAM CHEST 1 VIEW: CPT | Mod: 26

## 2025-03-13 PROCEDURE — 93005 ELECTROCARDIOGRAM TRACING: CPT

## 2025-03-13 PROCEDURE — 71045 X-RAY EXAM CHEST 1 VIEW: CPT

## 2025-03-13 PROCEDURE — 93010 ELECTROCARDIOGRAM REPORT: CPT

## 2025-03-13 PROCEDURE — 36415 COLL VENOUS BLD VENIPUNCTURE: CPT

## 2025-03-13 PROCEDURE — 99285 EMERGENCY DEPT VISIT HI MDM: CPT | Mod: 25

## 2025-03-13 RX ORDER — ALBUTEROL SULFATE 2.5 MG/3ML
2 VIAL, NEBULIZER (ML) INHALATION ONCE
Refills: 0 | Status: COMPLETED | OUTPATIENT
Start: 2025-03-13 | End: 2025-03-13

## 2025-03-13 RX ADMIN — Medication 2 PUFF(S): at 02:32

## 2025-03-13 NOTE — ED BEHAVIORAL HEALTH ASSESSMENT NOTE - NSSUICRSKFACTOR_PSY_ALL_CORE
Current and Past Psychiatric Diagnoses/Presenting Symptoms/Historical Factors/Treatment Related Factors Current and Past Psychiatric Diagnoses/Presenting Symptoms/Historical Factors/Treatment Related Factors/Activating Events/Stressors
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
## 2025-03-13 NOTE — ED ADULT NURSE NOTE - IS THE PATIENT ABLE TO BE SCREENED?
My findings and recommendations are based on patient's symptoms, eye exam, diagnostic testing, and records. Yes

## 2025-03-13 NOTE — ED BEHAVIORAL HEALTH ASSESSMENT NOTE - NSSUICPROTFACT_PSY_ALL_CORE
supportive housing, supervised setting/Identifies reasons for living/Positive therapeutic relationships supportive housing, supervised setting/Responsibility to children, family, or others/Identifies reasons for living/Positive therapeutic relationships

## 2025-03-13 NOTE — ED BEHAVIORAL HEALTH ASSESSMENT NOTE - DESCRIPTION
Pt in good behavioral control in ED    Vital Signs Last 24 Hrs  T(C): 36.8 (20 Feb 2025 02:43), Max: 36.9 (20 Feb 2025 00:35)  T(F): 98.3 (20 Feb 2025 02:43), Max: 98.5 (20 Feb 2025 00:35)  HR: 88 (20 Feb 2025 02:43) (87 - 95)  BP: 121/78 (20 Feb 2025 02:43) (111/65 - 123/90)  BP(mean): --  RR: 18 (20 Feb 2025 02:43) (18 - 20)  SpO2: 100% (20 Feb 2025 02:43) (96% - 100%)    Parameters below as of 20 Feb 2025 02:43  Patient On (Oxygen Delivery Method): room air per chart review domiciled at [facility] since 9/2024, previously lived in group home in [place], pt reports being transferred d/t bullying asthma, DM, BPH, urinary retention, hx testicular cancer s/p orchidectomy, GERD, hypothyroidism, HTN, HLD, myopia Pt in good behavioral control in ED    T(C): 36.7 (03-12-25 @ 20:13)  T(F): 98 (03-12-25 @ 20:13), Max: 98 (03-12-25 @ 20:13)  HR: 118 (03-12-25 @ 20:13) (118 - 118)  BP: 142/95 (03-12-25 @ 20:13) (142/95 - 142/95)  RR:  (18 - 18)  SpO2:  (98% - 98%)  Wt(kg): --
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
## 2025-03-13 NOTE — ED ADULT NURSE NOTE - NSFALLUNIVINTERV_ED_ALL_ED
Bed/Stretcher in lowest position, wheels locked, appropriate side rails in place/Call bell, personal items and telephone in reach/Instruct patient to call for assistance before getting out of bed/chair/stretcher/Non-slip footwear applied when patient is off stretcher/[place] to call system/Physically safe environment - no spills, clutter or unnecessary equipment/Purposeful proactive rounding/Room/bathroom lighting operational, light cord in reach

## 2025-03-13 NOTE — ED BEHAVIORAL HEALTH ASSESSMENT NOTE - NSPRESENTSXS_PSY_ALL_CORE
chronically poor impulse control/Impulsivity/Refusal or inability to complete safety plan chronically poor impulse control/Impulsivity
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
## 2025-03-13 NOTE — ED PEDIATRIC NURSE NOTE - OBJECTIVE STATEMENT
Pt presents to the ED c/o hallucinations. Pt reports he is hearing voices stating "to cut himself." Pt reports having suicidal ideation and has a plan of cutting his throat with glass. Pt  reports having homicidal ideation, but no plan. Pt also reports having shortness of breathing over the past 3 weeks with no cough, pt reports he used inhaler at home 3x at night PTA, without much relief. Staff at bedside states patient is currently being treated with nystatin for thrush. PMHX asthma, autism, factitious disorder, impulse control disorder, intellectual disability, diabetes. 1:1 initated upon arrival, facility staff at bedside.

## 2025-03-13 NOTE — ED ADULT NURSE NOTE - SUICIDE SCREENING QUESTION 3A
General Question     Subject: DAUGHTER Jairo Chung TO CALL BACK TO GO OVER PLAN  Patient and /or Facility Request: [facility]  Contact Number: [phone]     Pt's DAUGHTER JERALD (NOT ON COMM FORM) IS ASKING FOR A CALL BACK TO GO OVER THE PLAN FOR THE Pt. PLEASE CALL @ THE # ABOVE.
I let the daughter Alethea Vallejo) know that unfortunately she is not on the release of information form and I am unable to tell her anything about her dads care
More than 6 months

## 2025-03-13 NOTE — ED ADULT NURSE NOTE - CHIEF COMPLAINT QUOTE
Pt ambulatory to ED from [facility] with staff member w c/o auditory hallucinations telling him to "cut my throat". States he is also having thoughts of cutting himself. Also endorsing thoughts of punching people in his facility. While in route to hospital pt states he developed sob, respirations even and unlabored, speaking in complete sentences, O2 sat 97% on RA. states he has a sore throat starting Monday. Pt A&O4 in triage, calm and cooperative,  staff notified of pt status, 1:1 initiated for safety, sent for ekg.

## 2025-03-13 NOTE — ED ADULT NURSE NOTE - NSFALLRISKASMTTYPE_ED_ALL_ED
Discharge Instructions for  [facility]  [address] [place] [address]  Telephone: [phone]     FAX [phone]    Wound Care Center Information: Should you experience any significant changes in your wound(s) or have questions about your wound care, please contact the [facility] at MONDAY - FRIDAY 8:00 am - 4:30.  If you need help with your wound outside these hours and cannot wait until we are again available, contact your PCP or go to the hospital emergency room.     NAME:  Griselda Smith  YOB: 1977  DATE:  3/11/2024    : Siri     [x] May Shower:      [x] Apply moisturizing lotion such as A&D ointment to healed skin daily.    [facility] THANK YOU    Your treatment has been completed at [facility] outpatient wound clinic on 3/11/2024, per Dr. Alison Bray.    We know patients have several options when choosing a health care provider. We would like to express our sincere appreciation for having had the chance to be yours. More importantly, we enjoy having you as part of our family.     We also hope your experience with us has surpassed your expectations and that you have been pleased with our service. We appreciate the confidence you've shown in selecting us as your health care provider and we will continue or commitment to provide the highest quality of service.      Again, thank you for choosing us for your health care needs. If you have any further questions or concerns, please call [phone]. It has been our pleasure serving you and we hope to continue our relationship in the future, if the need occurs.     Sincerely,  [facility] Team      Electronically signed on 3/11/2024 at 8:00 AM     Physician Signature:_______________________  Alison Bray    Initial (On Arrival)

## 2025-03-13 NOTE — ED BEHAVIORAL HEALTH ASSESSMENT NOTE - CURRENT MEDICATION
per group home list list provided by [facility], and list reviewed, patient has been compliant with psychiatric medications (Buspirone 15mg qhS, Clonidine 0.3mg BID, Depakote 500mg at 2pm, 1000mg qhS, fluphenazine 75mg IM q4 weeks, remeron 15mg qHS, Remeron 7.5mg qHS, Risperdal 4mg at 2pm and 7pm

## 2025-03-13 NOTE — ED BEHAVIORAL HEALTH ASSESSMENT NOTE - NS ED BHA HOMICIDALITY PRESENT AGGRESSION PROPERTY LIFETIME
IMPRESSION and RECOMMENDATIONS:  These findings were discussed with the patient.     -- Diabetes mellitus type 2 - Minimal evidence of diabetic retinopathy on dilated fundoscopic examination today.   -- Mild non-proliferative diabetic retinopathy left eye - In particular no microaneurysms, no edema, no exudate, no hemorrhages, no neovascularization either eye. A single small blot hemorrhage left eye.  -- The nature of diabetes and the development of diabetic retinopathy was discussed with the patient; all questions were answered.  The role of blood sugar, blood pressure and lipids in the development and progression of diabetic retinopathy was also discussed.  The importance of an annual exam to monitor for retinopathy was emphasized to the patient.  The patient was encouraged to work on good glycemic control to reduce the risk of developing retinopathy and potential visual loss.      -- Nuclear sclerotic and cortical cataract both eyes  - not visually significant  -- Reviewed the nature of early cataracts and their effect on vision.    -- Observation is currently indicated.    -- Myopic astigmatism with presbyopia both eyes - adjustment in astigmatism improves acuity     -- MR given, refraction PERFORMED.     -- Reviewed the habits of good ocular health and safety including use of sunglasses for ultraviolet protection and safety eyewear when appropriate.    RTC 1 year complete eye exam - Diabetes, early cataract, sooner as needed any problems    Rose Briseno MD      CHIEF COMPLAINT:   Chief Complaint   Patient presents with   • Diabetic Eye Exam   • Office Visit        HISTORY OF PRESENT ILLNESS:  10/5/2022 - Established patient is a 63 year-old female who returns for a diabetic eye exam and to monitor cataracts.    She is not completely happy with her glasses today and would like a new prescription. Reading is more affected than distance.  No interval changes in health    Patient is a 63 year old    female with Diabetes mellitus, for 12 years (), most recent hemaglobin A1C   Hemoglobin A1C (%)   Date Value   2022 6.8 (H)   Blood Sugar = 136 result from 10/5/2022     2021 - Established patient is a 61 year-old female who returns for her annual diabetic eye exam and to monitor cataracts. She is have some difficulties at near. She replaced her lenses last year. She has no acute concerns today.    COVID vaccine completed. No interval change in health.       A&O x 3          M&A normal  Last:    CEE  2021    Glasses -  New lens 2018    EYE ROS:   DRY   Yes - occ, eyes will water if in front of a fan   IRRITATION  No     ITCH   No     PAIN   No     RED   No     PHOTOPHOBIA Yes     FLOATERS  No                   PHOTOPSIA               No    NEURO ROS:  DIPLOPIA  No  DIZZINESS  No  HA   No  NUMBNESS  No  TINGLING  No  SEIZURES  No    REVIEW OF SYSTEMS:    Positive - Skin rash with itching, Muscle and joint pain, dry mouth, anxiety and depression; otherwise Negative - systemic, ENT, cardiovascular, respiratory, gastrointestinal, genitourinary, musculoskeletal, dermatologic, hematologic, psychiatric      EXAMINATION:  See Ophthalmologic Exam section.      PAST OCULAR HISTORY: Amblyopia left eye, diabetes without retinopathy    FAMILY OCULAR HISTORY: Cataract, PGM    PAST MEDICAL HISTORY:  Past Medical History:   Diagnosis Date   • Anxiety    • Cataract    • Depression    • Diabetes mellitus (CMS/[facility])    • HTN (hypertension)    • Hyperlipidemia    • Osteoarthritis of both knees        PAST SURGICAL HISTORY:  Past Surgical History:   Procedure Laterality Date   • Colonoscopy  2011   • Hb tubal ligation add-on to   1988   • Knee surgery   &    • Lymph node dissection     • Mammo screening bilateral  2005   • Occult blood test tube  2011   Right and left knee surgery.         FAMILY MEDICAL HISTORY:  Family History   Problem Relation Age of Onset    • Depression Mother    • Alcohol Abuse Mother    • Depression Father    • Alcohol Abuse Father    • Rheumatologic Disease Sister         fibromyalgia   • Cancer, Ovarian Sister    • Substance Abuse Son    • Heart disease Brother        Social History:  Social History     Socioeconomic History   • Marital status:      Spouse name: Not on file   • Number of children: Not on file   • Years of education: Not on file   • Highest education level: Not on file   Occupational History   • Not on file   Tobacco Use   • Smoking status: Never Smoker   • Smokeless tobacco: Never Used   Vaping Use   • Vaping Use: never used   Substance and Sexual Activity   • Alcohol use: No   • Drug use: No   • Sexual activity: Not on file   Other Topics Concern   • Not on file   Social History Narrative   • Not on file     Social Determinants of Health     Financial Resource Strain: Not on file   Food Insecurity: Not on file   Transportation Needs: Not on file   Physical Activity: Not on file   Stress: Not on file   Social Connections: Not on file   Intimate Partner Violence: Not on file       ALLERGIES:   Allergen Reactions   • Tetracyclines & Related SWELLING   • Hydroxyzine GI UPSET     Other reaction(s): Nausea And Vomiting       Rose Briseno MD     Yes

## 2025-03-13 NOTE — ED BEHAVIORAL HEALTH ASSESSMENT NOTE - OTHER PAST PSYCHIATRIC HISTORY (INCLUDE DETAILS REGARDING ONSET, COURSE OF ILLNESS, INPATIENT/OUTPATIENT TREATMENT)
multiple past inpatient psychiatric admissions (as per [facility] staff, most recently 4/2024, hx of cutting himself, charted longstanding hx of endorsing SI, no known prior suicide attempts or significant violence; in outpatient treatment at [facility]

## 2025-03-13 NOTE — ED BEHAVIORAL HEALTH ASSESSMENT NOTE - RISK ASSESSMENT
rf: ASD, impulse control disorder, ID, mood symptoms  pf: help seeking, 24hr supervision, future/goal oriented, positive therapeutic relationships  current: though patient expresses SI, denies current IP, and is also future oriented stated he will go to another hospital if he is not admitted here. Pt is at low imminent risk of suicide or significant violence; however, he is at chronic elevated risk or harm to self and others. His risk factors include  ASD, impulse control disorder, ID, mood symptoms; as noted above he has maladaptive coping skills and low frustration tolerance that increase his risk chronically as he generally acts out through aggression and self harm. However patient has many protective factors that mitigate his risk factors. He is help seeking, future oriented, has positive therapeutic relationships, he is complaint with medication, and he has 24 1:1 supervision in a group home.

## 2025-03-13 NOTE — ED BEHAVIORAL HEALTH NOTE - BEHAVIORAL HEALTH NOTE
Collateral below has requested that the information provided remain confidential: Yes [  ] No [ x ]  Collateral below has provided information that patient is/may be unaware of: Yes [  ] No [x ]  Patient gives permission to obtain collateral from _____:  (  ) Yes  ( x )  No  Rationale for overriding objection            (  ) Lack of capacity. Details: ________            ( x ) Assessing risk of danger to self/others. Details: ________  Rationale for selecting specific collateral source            (  ) Potential to impact risk of danger to self/others and no alternative equivalent. Details: _____  NAME: Mark  NUMBER: [phone]  RELATIONSHIP: /Supervisor   RELIABILITY: Reliable, knows patient well.   COMMENTS: Obtained brief collateral   ========================  PATIENT DEMOGRAPHICS:  ========================  HPI Pt is a 35 y/o domiciled ([employer] [place]) male BIB EMS from [facility] for reportedly hearing voices. Consult called in reports that pt came in for a sore throat but also having suicidal thoughts.  BASELINE FUNCTIONING: As per collateral, pt usually reports SI/HI/AH. As per collateral, pt is currently at baseline and does not need to be in the hospital.   DATE HPI STARTED: Today  DECOMPENSATION: As per collateral, pt does not need to be in the hospital and frequently visits the ED. As per collateral pt Is at baseline and does not have any safety concerns for pt. Collateral stated that pt is on a 1:1 and continuously monitored at the [facility]. Collateral denied any SI/SA/HI/AH/VH, paranoia or delusional thinking. Collateral reports that today pt reported that he was hearing voices but staff did not observe pt responding to internal stimuli. Collateral is requesting for pt to be sent back as there are no safety concerns, pt is at baseline and pt frequents the ED and gets discharged.  SUICIDALITY Collateral reports that pt is known for making SI statements at the group home and has stated SI a few days ago.  VIOLENCE: None reported  SUBSTANCE: None reported

## 2025-03-17 NOTE — ED ADULT NURSE NOTE - CAS DISCH TRANSFER METHOD
You nose was packed today for your nose bleed.  You need to call the ENT, Dr. Navarrete to schedule a follow up to be seen in 1-2 days.     You must take the antibiotics as prescribed to avoid a serious infection.      This packing should be removed in 48 hours (2 days).  Call the Ear Nose and Throat specialist provided to schedule an appointment, or see your primary care physician to remove this packing.  Do not remove the packing yourself.  If you can’t get a follow-up appointment, return to an urgent care or the emergency department for removal of this packing.     Return to the ER if you worsen, if develop bleeding around the packing, if you have bleeding dripped on the back of the throat or in your left naris, if any other concerning symptoms.   Private car

## 2025-03-18 ENCOUNTER — EMERGENCY (EMERGENCY)
Facility: HOSPITAL | Age: 35
LOS: 1 days | Discharge: HOME | End: 2025-03-18
Attending: STUDENT IN AN ORGANIZED HEALTH CARE EDUCATION/TRAINING PROGRAM | Admitting: STUDENT IN AN ORGANIZED HEALTH CARE EDUCATION/TRAINING PROGRAM
Payer: MEDICAID

## 2025-03-18 VITALS
TEMPERATURE: 98 F | HEIGHT: 71 IN | DIASTOLIC BLOOD PRESSURE: 85 MMHG | HEART RATE: 111 BPM | SYSTOLIC BLOOD PRESSURE: 143 MMHG | WEIGHT: 272.49 LBS | OXYGEN SATURATION: 95 % | RESPIRATION RATE: 20 BRPM

## 2025-03-18 DIAGNOSIS — Z90.79 ACQUIRED ABSENCE OF OTHER GENITAL ORGAN(S): Chronic | ICD-10-CM

## 2025-03-18 LAB
RAPID RVP RESULT: SIGNIFICANT CHANGE UP
SARS-COV-2 RNA SPEC QL NAA+PROBE: SIGNIFICANT CHANGE UP

## 2025-03-18 PROCEDURE — 99283 EMERGENCY DEPT VISIT LOW MDM: CPT

## 2025-03-18 PROCEDURE — 99283 EMERGENCY DEPT VISIT LOW MDM: CPT | Mod: 25

## 2025-03-18 PROCEDURE — 0225U NFCT DS DNA&RNA 21 SARSCOV2: CPT

## 2025-03-18 PROCEDURE — 71046 X-RAY EXAM CHEST 2 VIEWS: CPT | Mod: 26

## 2025-03-18 PROCEDURE — 71046 X-RAY EXAM CHEST 2 VIEWS: CPT

## 2025-03-18 RX ORDER — AZITHROMYCIN 250 MG
1 CAPSULE ORAL
Qty: 6 | Refills: 0
Start: 2025-03-18 | End: 2025-03-22

## 2025-03-18 NOTE — ED PROVIDER NOTE - PATIENT PORTAL LINK FT
You can access the FollowMyHealth Patient Portal offered by [facility] by registering at the following website: [email or web address]. By joining [employer]’s FollowMyHealth portal, you will also be able to view your health information using other applications (apps) compatible with our system.

## 2025-03-18 NOTE — ED PROVIDER NOTE - CLINICAL SUMMARY MEDICAL DECISION MAKING FREE TEXT BOX
Patient 34-year-old male with history of GERD, factitious disorder, autism, DM presents emergency department for 1 to 2 weeks of cough, productive.  Endorses nasal congestion.  Denies fever, chills, chest pain, abdominal pain    General: well appearing, no acute distress, overweight  Head: normocephalic, atraumatic.   Cardiac: heart RRR, no murmurs, rubs, gallops, pulses 2+ x4. chest has no TTP. no LE edema or calf TTP  Pulm: lungs CTA  GI: abdomen soft, nontender, negative rebound, not distended  Skin: warm, dry, no rash, laceration, abrasion, contusion    will obtain CXR, swab. likely viral vs bronchitis. as has been 2 weeks will give z mandi and have FU with OP PCP.

## 2025-03-18 NOTE — ED ADULT NURSE NOTE - CHIEF COMPLAINT QUOTE
I have a cough, which I have had for a while.  I went to urgent care, and they told me I had a throat infection, but only told me to get otc stuff.  there are others in the group home who have pna and they want to me sure that I don't have it.  (patient now stating he was at [facility] last night for psych eval and medical issues, but current aid is not aware of any of this).  Aid that is with him is not aware of any er visit last night.  patient is insistent that he was seen but aid is unaware

## 2025-03-18 NOTE — ED ADULT NURSE NOTE - OBJECTIVE STATEMENT
pt states that he was sent in for a cough. pt denies SOB at this time. pt in no acute distress. pt updated on plan of care.

## 2025-03-18 NOTE — ED PROVIDER NOTE - NSFOLLOWUPINSTRUCTIONS_ED_ALL_ED_FT
Cough, Adult  Coughing is a reflex that clears your throat and airways (respiratory system). It helps heal and protect your lungs. It is normal to cough from time to time. A cough that happens with other symptoms or that lasts a long time may be a sign of a condition that needs treatment. A short-term (acute) cough may only last 2–3 weeks. A long-term (chronic) cough may last 8 or more weeks.    Coughing is often caused by:  Diseases, such as:  An infection of the respiratory system.  Asthma or other heart or lung diseases.  Gastroesophageal reflux. This is when acid comes back up from the stomach.  Breathing in things that irritate your lungs.  Allergies.  Postnasal drip. This is when mucus runs down the back of your throat.  Smoking.  Some medicines.  Follow these instructions at home:  Medicines    Take over-the-counter and prescription medicines only as told by your health care provider.  Talk with your provider before you take cough medicine (cough suppressants).  Eating and drinking    Do not drink alcohol.  Avoid caffeine.  Drink enough fluid to keep your pee (urine) pale yellow.  Lifestyle    Avoid cigarette smoke.  Do not use any products that contain nicotine or tobacco. These products include cigarettes, chewing tobacco, and vaping devices, such as e-cigarettes. If you need help quitting, ask your provider.  Avoid things that make you cough. These may include perfumes, candles, cleaning products, or campfire smoke.  General instructions    A person holding a cloth over the mouth and nose while coughing.  Watch for any changes to your cough. Tell your provider about them.  Always cover your mouth when you cough.  If the air is dry in your bedroom or home, use a cool mist vaporizer or humidifier.  If your cough is worse at night, try to sleep in a semi-upright position.  Rest as needed.  Contact a health care provider if:  You have new symptoms, or your symptoms get worse.  You cough up pus.  You have a fever that does not go away or a cough that does not get better after 2–3 weeks.  You cannot control your cough with medicine, and you are losing sleep.  You have pain that gets worse or is not helped with medicine.  You lose weight for no clear reason.  You have night sweats.  Get help right away if:  You cough up blood.  You have trouble breathing.  Your heart is beating very fast.  These symptoms may be an emergency. Get help right away. Call 911.  Do not wait to see if the symptoms will go away.  Do not drive yourself to the hospital.    Take azithromycin as prescribed.

## 2025-03-18 NOTE — ED PROVIDER NOTE - OBJECTIVE STATEMENT
34-year-old male history of intellectual disability obesity asthma GERD factitious disorder autism type 2 diabetes presenting to the ED for productive cough with yellow/green phlegm x1-2w. Admits to nasal congestion. Denies fevers, ST, cp, sob,. Sent from group home to r/o PNA bc another resident tested positive for PNA.

## 2025-03-18 NOTE — ED ADULT TRIAGE NOTE - CHIEF COMPLAINT QUOTE
I have a cough, which I have had for a while.  I went to urgent care, and they told me I had a throat infection, but only told me to get otc stuff.  there are others in the group home who have pna and they want to me sure that I don't have it.  (patient now stating he was at [facility] last night for psych eval and medical issues, but current aid is not aware of any of this).  Aid that is with him is not aware of any er visit last night.  patient is insistent that he was seen but aid is unaware

## 2025-03-25 ENCOUNTER — APPOINTMENT (OUTPATIENT)
Dept: CT IMAGING | Facility: CLINIC | Age: 35
End: 2025-03-25
Payer: MEDICAID

## 2025-03-25 ENCOUNTER — OUTPATIENT (OUTPATIENT)
Dept: OUTPATIENT SERVICES | Facility: HOSPITAL | Age: 35
LOS: 1 days | End: 2025-03-25
Payer: MEDICAID

## 2025-03-25 DIAGNOSIS — C62.91 MALIGNANT NEOPLASM OF RIGHT TESTIS, UNSPECIFIED WHETHER DESCENDED OR UNDESCENDED: ICD-10-CM

## 2025-03-25 PROCEDURE — 74177 CT ABD & PELVIS W/CONTRAST: CPT | Mod: 26

## 2025-03-25 PROCEDURE — 74177 CT ABD & PELVIS W/CONTRAST: CPT

## 2025-03-26 ENCOUNTER — EMERGENCY (EMERGENCY)
Facility: HOSPITAL | Age: 35
LOS: 0 days | Discharge: HOME | End: 2025-03-26
Attending: STUDENT IN AN ORGANIZED HEALTH CARE EDUCATION/TRAINING PROGRAM
Payer: MEDICAID

## 2025-03-26 VITALS
HEART RATE: 104 BPM | SYSTOLIC BLOOD PRESSURE: 134 MMHG | OXYGEN SATURATION: 97 % | RESPIRATION RATE: 18 BRPM | HEIGHT: 71 IN | TEMPERATURE: 98 F | DIASTOLIC BLOOD PRESSURE: 93 MMHG | WEIGHT: 272.05 LBS

## 2025-03-26 VITALS
HEART RATE: 90 BPM | SYSTOLIC BLOOD PRESSURE: 124 MMHG | RESPIRATION RATE: 18 BRPM | TEMPERATURE: 99 F | DIASTOLIC BLOOD PRESSURE: 85 MMHG | OXYGEN SATURATION: 99 %

## 2025-03-26 DIAGNOSIS — Z90.79 ACQUIRED ABSENCE OF OTHER GENITAL ORGAN(S): Chronic | ICD-10-CM

## 2025-03-26 LAB
AMPHET UR-MCNC: NEGATIVE — SIGNIFICANT CHANGE UP
ANION GAP SERPL CALC-SCNC: 5 MMOL/L — SIGNIFICANT CHANGE UP (ref 5–17)
APAP SERPL-MCNC: <2 UG/ML — LOW (ref 10–30)
APPEARANCE UR: ABNORMAL
BACTERIA # UR AUTO: NEGATIVE /HPF — SIGNIFICANT CHANGE UP
BARBITURATES UR SCN-MCNC: NEGATIVE — SIGNIFICANT CHANGE UP
BASOPHILS # BLD AUTO: 0.03 K/UL — SIGNIFICANT CHANGE UP (ref 0–0.2)
BASOPHILS NFR BLD AUTO: 0.4 % — SIGNIFICANT CHANGE UP (ref 0–2)
BENZODIAZ UR-MCNC: NEGATIVE — SIGNIFICANT CHANGE UP
BILIRUB UR-MCNC: ABNORMAL
BUN SERPL-MCNC: 14 MG/DL — SIGNIFICANT CHANGE UP (ref 7–23)
CALCIUM SERPL-MCNC: 9.4 MG/DL — SIGNIFICANT CHANGE UP (ref 8.5–10.1)
CAST: 2 /LPF — SIGNIFICANT CHANGE UP (ref 0–4)
CHLORIDE SERPL-SCNC: 104 MMOL/L — SIGNIFICANT CHANGE UP (ref 96–108)
CO2 SERPL-SCNC: 26 MMOL/L — SIGNIFICANT CHANGE UP (ref 22–31)
COCAINE METAB.OTHER UR-MCNC: NEGATIVE — SIGNIFICANT CHANGE UP
COLOR SPEC: SIGNIFICANT CHANGE UP
CREAT SERPL-MCNC: 1.05 MG/DL — SIGNIFICANT CHANGE UP (ref 0.5–1.3)
DIFF PNL FLD: NEGATIVE — SIGNIFICANT CHANGE UP
EGFR: 95 ML/MIN/1.73M2 — SIGNIFICANT CHANGE UP
EGFR: 95 ML/MIN/1.73M2 — SIGNIFICANT CHANGE UP
EOSINOPHIL # BLD AUTO: 0.1 K/UL — SIGNIFICANT CHANGE UP (ref 0–0.5)
EOSINOPHIL NFR BLD AUTO: 1.3 % — SIGNIFICANT CHANGE UP (ref 0–6)
ETHANOL SERPL-MCNC: <10 MG/DL — SIGNIFICANT CHANGE UP (ref 0–10)
FENTANYL UR QL SCN: NEGATIVE — SIGNIFICANT CHANGE UP
FLUAV AG NPH QL: SIGNIFICANT CHANGE UP
FLUBV AG NPH QL: SIGNIFICANT CHANGE UP
GLUCOSE SERPL-MCNC: 144 MG/DL — HIGH (ref 70–99)
GLUCOSE UR QL: NEGATIVE MG/DL — SIGNIFICANT CHANGE UP
HCT VFR BLD CALC: 45.7 % — SIGNIFICANT CHANGE UP (ref 39–50)
HGB BLD-MCNC: 14.3 G/DL — SIGNIFICANT CHANGE UP (ref 13–17)
IMM GRANULOCYTES # BLD AUTO: 0.05 K/UL — SIGNIFICANT CHANGE UP (ref 0–0.07)
IMM GRANULOCYTES NFR BLD AUTO: 0.7 % — SIGNIFICANT CHANGE UP (ref 0–0.9)
KETONES UR-MCNC: 15 MG/DL
LEUKOCYTE ESTERASE UR-ACNC: ABNORMAL
LYMPHOCYTES # BLD AUTO: 1.96 K/UL — SIGNIFICANT CHANGE UP (ref 1–3.3)
LYMPHOCYTES NFR BLD AUTO: 26.4 % — SIGNIFICANT CHANGE UP (ref 13–44)
MCHC RBC-ENTMCNC: 24.9 PG — LOW (ref 27–34)
MCHC RBC-ENTMCNC: 31.3 G/DL — LOW (ref 32–36)
MCV RBC AUTO: 79.6 FL — LOW (ref 80–100)
METHADONE UR-MCNC: NEGATIVE — SIGNIFICANT CHANGE UP
MONOCYTES # BLD AUTO: 0.35 K/UL — SIGNIFICANT CHANGE UP (ref 0–0.9)
MONOCYTES NFR BLD AUTO: 4.7 % — SIGNIFICANT CHANGE UP (ref 2–14)
NEUTROPHILS # BLD AUTO: 4.93 K/UL — SIGNIFICANT CHANGE UP (ref 1.8–7.4)
NEUTROPHILS NFR BLD AUTO: 66.5 % — SIGNIFICANT CHANGE UP (ref 43–77)
NITRITE UR-MCNC: NEGATIVE — SIGNIFICANT CHANGE UP
NRBC # BLD AUTO: 0 K/UL — SIGNIFICANT CHANGE UP (ref 0–0)
NRBC # FLD: 0 K/UL — SIGNIFICANT CHANGE UP (ref 0–0)
NRBC BLD AUTO-RTO: 0 /100 WBCS — SIGNIFICANT CHANGE UP (ref 0–0)
OPIATES UR-MCNC: NEGATIVE — SIGNIFICANT CHANGE UP
PCP SPEC-MCNC: SIGNIFICANT CHANGE UP
PCP UR-MCNC: NEGATIVE — SIGNIFICANT CHANGE UP
PH UR: 5.5 — SIGNIFICANT CHANGE UP (ref 5–8)
PLATELET # BLD AUTO: 221 K/UL — SIGNIFICANT CHANGE UP (ref 150–400)
PMV BLD: 10.8 FL — SIGNIFICANT CHANGE UP (ref 7–13)
POTASSIUM SERPL-MCNC: 3.6 MMOL/L — SIGNIFICANT CHANGE UP (ref 3.5–5.3)
POTASSIUM SERPL-SCNC: 3.6 MMOL/L — SIGNIFICANT CHANGE UP (ref 3.5–5.3)
PROT UR-MCNC: 30 MG/DL
RBC # BLD: 5.74 M/UL — SIGNIFICANT CHANGE UP (ref 4.2–5.8)
RBC # FLD: 15.1 % — HIGH (ref 10.3–14.5)
RBC CASTS # UR COMP ASSIST: 2 /HPF — SIGNIFICANT CHANGE UP (ref 0–4)
RSV RNA NPH QL NAA+NON-PROBE: SIGNIFICANT CHANGE UP
SALICYLATES SERPL-MCNC: <1.7 MG/DL — LOW (ref 2.8–20)
SARS-COV-2 RNA SPEC QL NAA+PROBE: SIGNIFICANT CHANGE UP
SODIUM SERPL-SCNC: 135 MMOL/L — SIGNIFICANT CHANGE UP (ref 135–145)
SOURCE RESPIRATORY: SIGNIFICANT CHANGE UP
SP GR SPEC: >1.03 — HIGH (ref 1–1.03)
SQUAMOUS # UR AUTO: 22 /HPF — HIGH (ref 0–5)
THC UR QL: NEGATIVE — SIGNIFICANT CHANGE UP
UROBILINOGEN FLD QL: 1 MG/DL — SIGNIFICANT CHANGE UP (ref 0.2–1)
WBC # BLD: 7.42 K/UL — SIGNIFICANT CHANGE UP (ref 3.8–10.5)
WBC # FLD AUTO: 7.42 K/UL — SIGNIFICANT CHANGE UP (ref 3.8–10.5)
WBC UR QL: 2 /HPF — SIGNIFICANT CHANGE UP (ref 0–5)

## 2025-03-26 PROCEDURE — 93005 ELECTROCARDIOGRAM TRACING: CPT

## 2025-03-26 PROCEDURE — 99285 EMERGENCY DEPT VISIT HI MDM: CPT

## 2025-03-26 PROCEDURE — 90792 PSYCH DIAG EVAL W/MED SRVCS: CPT | Mod: 93

## 2025-03-26 PROCEDURE — 0241U: CPT

## 2025-03-26 PROCEDURE — 80048 BASIC METABOLIC PNL TOTAL CA: CPT

## 2025-03-26 PROCEDURE — 80307 DRUG TEST PRSMV CHEM ANLYZR: CPT

## 2025-03-26 PROCEDURE — 99284 EMERGENCY DEPT VISIT MOD MDM: CPT

## 2025-03-26 PROCEDURE — 93010 ELECTROCARDIOGRAM REPORT: CPT

## 2025-03-26 PROCEDURE — 36415 COLL VENOUS BLD VENIPUNCTURE: CPT

## 2025-03-26 PROCEDURE — 85025 COMPLETE CBC W/AUTO DIFF WBC: CPT

## 2025-03-26 PROCEDURE — 81001 URINALYSIS AUTO W/SCOPE: CPT

## 2025-03-26 RX ORDER — BUSPIRONE HYDROCHLORIDE 15 MG/1
15 TABLET ORAL ONCE
Refills: 0 | Status: COMPLETED | OUTPATIENT
Start: 2025-03-26 | End: 2025-03-26

## 2025-03-26 RX ORDER — MIRTAZAPINE 30 MG/1
22.5 TABLET, FILM COATED ORAL ONCE
Refills: 0 | Status: COMPLETED | OUTPATIENT
Start: 2025-03-26 | End: 2025-03-26

## 2025-03-26 RX ORDER — RISPERIDONE 4 MG
4 TABLET ORAL ONCE
Refills: 0 | Status: COMPLETED | OUTPATIENT
Start: 2025-03-26 | End: 2025-03-26

## 2025-03-26 RX ADMIN — BUSPIRONE HYDROCHLORIDE 15 MILLIGRAM(S): 15 TABLET ORAL at 23:09

## 2025-03-26 RX ADMIN — Medication 500 MILLIGRAM(S): at 23:08

## 2025-03-26 RX ADMIN — MIRTAZAPINE 22.5 MILLIGRAM(S): 30 TABLET, FILM COATED ORAL at 23:08

## 2025-03-26 RX ADMIN — Medication 4 MILLIGRAM(S): at 23:18

## 2025-03-26 NOTE — ED BEHAVIORAL HEALTH ASSESSMENT NOTE - NSSUICPROTFACT_PSY_ALL_CORE
supportive housing, supervised setting/Responsibility to children, family, or others/Identifies reasons for living/Supportive social network of family or friends/Fear of death or the actual act of killing self/Engaged in work or school/Positive therapeutic relationships

## 2025-03-26 NOTE — ED PROVIDER NOTE - PATIENT PORTAL LINK FT
You can access the FollowMyHealth Patient Portal offered by [facility] by registering at the following website: [email or web address]. By joining [employer]’s FollowMyHealth portal, you will also be able to view your health information using other applications (apps) compatible with our system.

## 2025-03-26 NOTE — ED BEHAVIORAL HEALTH NOTE - BEHAVIORAL HEALTH NOTE
Collateral below has requested that the information provided remain confidential: Yes [  ] No [ x ]  Collateral below has provided information that patient is/may be unaware of: Yes [  ] No [x ]  Patient gives permission to obtain collateral from _____:  (  ) Yes  ( x )  No  Rationale for overriding objection            (  ) Lack of capacity. Details: ________            ( x ) Assessing risk of danger to self/others. Details: ________  Rationale for selecting specific collateral source            (  ) Potential to impact risk of danger to self/others and no alternative equivalent. Details: _____  NAME: Mark  NUMBER: [phone]  RELATIONSHIP: /Supervisor   RELIABILITY: Reliable, knows patient well.   COMMENTS: Obtained brief collateral   ========================  PATIENT DEMOGRAPHICS:  ========================  HPI Pt is a 33 y/o domiciled ([employer] [place]) male BIB EMS from retirement for SI and reporting SIB by scratching stomach with credit card.   BASELINE FUNCTIONING: As per collateral, pt usually reports SI/HI/AH. As per collateral, pt is currently at baseline and does not need to be in the hospital.   DATE HPI STARTED: Today  DECOMPENSATION: As per collateral, pt does not need to be in the hospital and frequently visits the ED. As per collateral pt Is at baseline and does not have any safety concerns for pt. Collateral stated that pt is on a 1:1 and continuously monitored at the retirement. Collateral denied any SI/SA/HI/AH/VH, paranoia or delusional thinking. Collateral reports today patient had an interview for a job program and really felt he was going to get hired leading up to the interview. Collateral believes patient was declined and this triggered his behavior. Collateral is requesting for pt to be sent back as there are no safety concerns.  SUICIDALITY Collateral reports that pt is known for making SI statements at the group home.  VIOLENCE: None reported  SUBSTANCE: None reported

## 2025-03-26 NOTE — ED BEHAVIORAL HEALTH ASSESSMENT NOTE - HPI (INCLUDE ILLNESS QUALITY, SEVERITY, DURATION, TIMING, CONTEXT, MODIFYING FACTORS, ASSOCIATED SIGNS AND SYMPTOMS)
34 year old male, single, domiciled at [facility] (with 24hr 1:1), employed as assistant reader at [employer], with PMHx of asthma, DM, BPH, urinary retention, hx testicular cancer s/p orchidectomy, GERD, hypothyroidism, HTN, HLD, myopia, PPHx of autism spectrum disorder, moderate intellectual disability; other diagnoses includes bipolar disorder, impulse control disorder, HAVEN, mood disorder NOS, PTSD, ADHD and factitious disorder, with multiple past inpatient psychiatric admissions (as per [employer] staff, most recently 4/2024), hx of NSSIB via cutting, charted longstanding hx of endorsing SI (situational: mostly stemming from discontent of living environment or situation) as well as hx of aggression toward [employer] staff; BIBEMS, due to self harm behavior and initially requesting admission.     I am familiar with this patient from his visit on 3/13/25. Pt seen and chart reviewed.  patient has euthymic affect, rocking back and fourth, with a concrete thought process; he is preoccupied with admission. Pt states that yesterday he was upset because $2800 of his money was put in a trust, so he went to the bathroom and cut his abdomen with a credit card. He had a superficial abrasion on his stomach. He states that he was not suicidal, he was just feeling stressed about money bc he wanted to buy nice stuff. He feels better now because he will get his money back soon. He reports good sleep, low appetite. Denies suicidal ideation, intent, and plan. Denies homicidal/violent ideation/intent/plan. Denies plan to cut himself. He states that he wants staff to take his credit cards away so he is not tempted and he also wants them to remove his television bc he no longer has a remote and he sometimes scratches his arm on the back of the TV. He states that he feels calm. He is looking forward to his birthday in may, he is going to an arcade and will have a bbq. he said that he is taking his medications, despite report from staff that he has not had medication in 2 days. He requested to get his po night medications before he returns home. Staff member at bedside said that patient took his 2pm medications. Denies auditory/visual hallucinations no paranoia. No drug or substance abuse. During the day patient works, he is attempting to get a second job, had a phone interview today and states that he will have to do training for it. He denies that the interview was the reason he was upset today. He has a new psychiatrist and a new therapist; he has an appointment soon. Pt is agreeable to return home. He is future oriented. states he has been getting along with staff members and he feels that he is friends with everyone. When he is stressed he states that he likes to speak to Elvin (staff who was at bedside), use the boxing bag that is outside of his house, and he lives to go to the gym. Staff member at bedside said that he will obtain the medication list and share with the medical doctor so he can receive his QHS medications.     Collateral obtained from group home staff by [facility], see separate note for details. In short, staff reports that this is patient's baseline, (patient has a hx of low frustration tolerance, expressing SI, and self harm, and coming to ED with desire to be hospitalized.) He is on constant observation in group home (aside from when he uses bathroom, and if he is in room, they check on him every 5 minutes).  Staff denies any safety concerns with patient returning back to group home. Staff is at bedside in hospital who can coordinate coming home.

## 2025-03-26 NOTE — ED PROVIDER NOTE - CLINICAL SUMMARY MEDICAL DECISION MAKING FREE TEXT BOX
34-year-old male past medical history of intellectual disability, autism presents ED sent in from group home for self injures behavior and suicidal ideation.  Patient states he cut his abdomen with a broken credit card and attempt to kill himself.  Patient did the same thing last week.  Patient endorses auditory hallucinations telling him to kill himself and would like to be admitted psychiatrically.  Denies HI.   Patient with abrasion to the right side of the abdomen, hemodynamically stable, cooperative and calm.  Patient is up-to-date with tetanus.  Patient with suicidality and self injures behavior.  Plan for psych clearance labs, constant observation, telepsych consult.  Reassess

## 2025-03-26 NOTE — ED ADULT NURSE NOTE - OBJECTIVE STATEMENT
34 year old male BIBEMS from group home for suicidal ideation. Pt with history of autism and MR and states suicidal ideation and tried to cut himself with credit card on his abdomen - small abrasion noted, no bleeding. Pt states "I want to cut my throat with a credit card". Pt states "voices are telling me to kill myself and I see ghosts all the time". Denies HI, requesting psychiatric evaluation and admission.

## 2025-03-26 NOTE — ED PROVIDER NOTE - PHYSICAL EXAMINATION
GEN: Patient awake alert NAD.   HEENT: normocephalic, atraumatic, EOMI, no scleral icterus, moist MM  CARDIAC: RRR, S1, S2, no murmur.   PULM: CTA B/L no wheeze, rhonchi, rales.   ABD: soft NT, ND, no rebound no guarding,   MSK: Moving all extremities, no edema.   NEURO: A&Ox3, gait normal, no focal neurological deficits,   SKIN: warm, dry, abrasion to R sided of abdomen   PSYCH: calm and cooperative

## 2025-03-26 NOTE — ED PROVIDER NOTE - NSFOLLOWUPINSTRUCTIONS_ED_ALL_ED_FT
Please continue taking all your prescribed medications.    Please return to the ER or call 911 if you develop thoughts of harming yourself or other people.

## 2025-03-26 NOTE — ED ADULT TRIAGE NOTE - CHIEF COMPLAINT QUOTE
pt bibems for c/o SI. pt endorses that he is hearing voices telling him to harm himself. pt presents with abrasion to right abdomen. endorses he was breaking something open and cut himself. pt bibems for c/o SI. pt endorses that he is hearing voices telling him to harm himself. pt presents with abrasion to right abdomen. endorses he was breaking something open and cut himself. denies alcohol/drug use. 1:1 initiated in triage, pt calm and cooperative at this time.

## 2025-03-26 NOTE — ED BEHAVIORAL HEALTH ASSESSMENT NOTE - SUMMARY
34 year old male, single, domiciled at [facility] (with 24hr 1:1), employed as assistant reader at [employer], with PMHx of asthma, DM, BPH, urinary retention, hx testicular cancer s/p orchidectomy, GERD, hypothyroidism, HTN, HLD, myopia, PPHx of autism spectrum disorder, moderate intellectual disability; other diagnoses includes bipolar disorder, impulse control disorder, HAVEN, mood disorder NOS, PTSD, ADHD and factitious disorder, with multiple past inpatient psychiatric admissions (as per [employer] staff, most recently 4/2024), hx of NSSIB via cutting, charted longstanding hx of endorsing SI (situational: mostly stemming from discontent of living environment or situation) as well as hx of aggression toward [employer] staff; BIBEMS, due to self harm behavior and initially requesting admission.     On evaluation today, pt is not acutely psychotic, manic or depressed. He denies all psychiatric symptoms. He had an episode of self harm with credit card yesterday in context of frustration - he was not seriously injured. He denies current SI/HI, he is in good spirits, is future oriented, wants to return back to his residence. He also requested his medication. Patient's residence stated that he is at his baseline; he is on 1:1 24 hours a day at the residence, and they have no acute safety concerns. Discussed with staff member at bedside to ensure patient does not have access to his credit card or other sharp objects. They are agreeable for him to return home. At baseline patient engages in self harm behavior and is aggressive in the setting of impulsivity, maladaptive coping skills and low frustration tolerance related to his intellectual disability/autism and this risk will not be reduced via acute IPP hospitalization.

## 2025-03-26 NOTE — BH SAFETY PLAN - ENVIRONMENT SAFETY 1:
remove access to sharp objects including credit cards. Please remove television from room per Markus's request

## 2025-03-26 NOTE — ED BEHAVIORAL HEALTH ASSESSMENT NOTE - CURRENT MEDICATION
list provided by [facility] on 3/13/25, and list reviewed, patient has been compliant with psychiatric medications (Buspirone 15mg qhS, Clonidine 0.3mg BID, Depakote 500mg at 2pm, 1000mg qhS, fluphenazine 75mg IM q4 weeks, remeron 15mg qHS, Remeron 7.5mg qHS, Risperdal 4mg at 2pm and 7pm

## 2025-03-26 NOTE — ED BEHAVIORAL HEALTH ASSESSMENT NOTE - OTHER PAST PSYCHIATRIC HISTORY (INCLUDE DETAILS REGARDING ONSET, COURSE OF ILLNESS, INPATIENT/OUTPATIENT TREATMENT)
multiple past inpatient psychiatric admissions (as per [facility] staff, most recently 4/2024, hx of cutting himself, charted longstanding hx of endorsing SI, no known prior suicide attempts or significant violence; in outpatient treatment at [place]

## 2025-03-26 NOTE — ED PROVIDER NOTE - PROGRESS NOTE DETAILS
Patient cleared by psychiatry, patient with attention seeking behavior and poor impulse control but now is calm and cooperative.  Dr. Calles from psychiatry does request patient receive his home meds prior to discharge.  Patient is on a constant one-to-one at the group home.  They will drive him back.

## 2025-03-26 NOTE — ED ADULT NURSE NOTE - NSFALLRISKINTERV_ED_ALL_ED

## 2025-03-27 DIAGNOSIS — F84.0 AUTISTIC DISORDER: ICD-10-CM

## 2025-03-27 DIAGNOSIS — Z91.030 BEE ALLERGY STATUS: ICD-10-CM
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
## 2025-03-27 DIAGNOSIS — F79 UNSPECIFIED INTELLECTUAL DISABILITIES: ICD-10-CM

## 2025-03-27 DIAGNOSIS — R45.851 SUICIDAL IDEATIONS: ICD-10-CM
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
## 2025-03-27 DIAGNOSIS — Z88.8 ALLERGY STATUS TO OTHER DRUGS, MEDICAMENTS AND BIOLOGICAL SUBSTANCES: ICD-10-CM

## 2025-03-27 DIAGNOSIS — S30.811A ABRASION OF ABDOMINAL WALL, INITIAL ENCOUNTER: ICD-10-CM

## 2025-03-27 NOTE — ED ADULT NURSE NOTE - SUICIDE SCREENING QUESTION 3
Liliana Reynoso RN  Outcome: Progressing      Yes

## 2025-04-04 ENCOUNTER — EMERGENCY (EMERGENCY)
Facility: HOSPITAL | Age: 35
LOS: 1 days | End: 2025-04-04
Attending: STUDENT IN AN ORGANIZED HEALTH CARE EDUCATION/TRAINING PROGRAM
Payer: MEDICAID

## 2025-04-04 DIAGNOSIS — Z90.79 ACQUIRED ABSENCE OF OTHER GENITAL ORGAN(S): Chronic | ICD-10-CM

## 2025-04-04 PROCEDURE — 99285 EMERGENCY DEPT VISIT HI MDM: CPT

## 2025-04-04 NOTE — PATIENT PROFILE ADULT - FUNCTIONAL ASSESSMENT - BASIC MOBILITY 2.
Writer explained that patient was routed for clinical support due to urgent concerns. Caller was transferred to writer for: Thoughts of hurting yourself or others    Patient states his father passed away about 3 weeks ago. Patient reports increased thoughts of self harm. Patient denies ever having SI before that.     Patient states thoughts are to harm not kill self. Patient states he is frustrated as he is unable to do things he used to be able to r/t disability. Patient states he becomes angry, and has hit self a few times. Patient wants to stop this behavior and requesting to schedule new patient appointment with psychiatrist.    Patient states he is able to keep himself safe at this time. Patient refusing ED visit at this time, stating he does not feel it is necessary.    Writer asked the patient (For Adults: Ask only if patient is present):   In the past month, have you wished you were dead or wished you could go to sleep and not wake up? No  In the past month, have you actually had any thoughts of killing yourself or others?  No    Patient's C-SSRS Suicidal Risk Level is: Negative Screen - WHITE.    If Red: Not applicable    For all other C-SSRS risk levels:    Writer asked the patient:  Are you currently experiencing any auditory or visual hallucinations? No  Do you have any history of experiencing auditory or visual hallucinations? No  If yes: are these hallucinations command in nature. N/A  3. Have you recently used any alcohol, drugs, and/or illicit substances? No  4. Are you experiencing either of the following: None      Where are you located right now? home    Do you have anyone available for support? Sister, brother in Law    Can you promise that you will not do anything to harm yourself or anyone else until your scheduled appointment? Yes    Caller was advised that they should remove all weapons (guns, knives, etc.) from the home along with any dangerous medications: N/A    Patient/Parent advised that  if there are any changes, between now and the time of your scheduled appointment, patient is to go to the nearest ER for evaluation: Yes    Patient/Parent advised that, if they feel unsafe, they have the option of calling 911, [phone], or going to nearest hospital  Yes    Advised patient of community resources. No     If the patient is established with [facility] provider: route message to Provider's pool: No      4 = No assist / stand by assistance

## 2025-04-05 ENCOUNTER — EMERGENCY (EMERGENCY)
Facility: HOSPITAL | Age: 35
LOS: 1 days | End: 2025-04-05
Attending: STUDENT IN AN ORGANIZED HEALTH CARE EDUCATION/TRAINING PROGRAM | Admitting: EMERGENCY MEDICINE
Payer: MEDICAID

## 2025-04-05 VITALS
DIASTOLIC BLOOD PRESSURE: 66 MMHG | TEMPERATURE: 98 F | OXYGEN SATURATION: 97 % | RESPIRATION RATE: 18 BRPM | SYSTOLIC BLOOD PRESSURE: 94 MMHG | HEART RATE: 95 BPM

## 2025-04-05 VITALS
HEIGHT: 71 IN | DIASTOLIC BLOOD PRESSURE: 75 MMHG | TEMPERATURE: 98 F | RESPIRATION RATE: 18 BRPM | WEIGHT: 240.08 LBS | OXYGEN SATURATION: 97 % | SYSTOLIC BLOOD PRESSURE: 104 MMHG | HEART RATE: 98 BPM

## 2025-04-05 VITALS
RESPIRATION RATE: 18 BRPM | HEART RATE: 120 BPM | TEMPERATURE: 98 F | SYSTOLIC BLOOD PRESSURE: 143 MMHG | HEIGHT: 71 IN | OXYGEN SATURATION: 97 % | DIASTOLIC BLOOD PRESSURE: 89 MMHG

## 2025-04-05 LAB
ALBUMIN SERPL ELPH-MCNC: 3.7 G/DL — SIGNIFICANT CHANGE UP (ref 3.3–5)
ALP SERPL-CCNC: 65 U/L — SIGNIFICANT CHANGE UP (ref 40–120)
ALT FLD-CCNC: 45 U/L — SIGNIFICANT CHANGE UP (ref 12–78)
ANION GAP SERPL CALC-SCNC: 5 MMOL/L — SIGNIFICANT CHANGE UP (ref 5–17)
APAP SERPL-MCNC: <2 UG/ML — LOW (ref 10–30)
APPEARANCE UR: CLEAR — SIGNIFICANT CHANGE UP
AST SERPL-CCNC: 18 U/L — SIGNIFICANT CHANGE UP (ref 15–37)
BASOPHILS # BLD AUTO: 0.03 K/UL — SIGNIFICANT CHANGE UP (ref 0–0.2)
BASOPHILS NFR BLD AUTO: 0.4 % — SIGNIFICANT CHANGE UP (ref 0–2)
BILIRUB SERPL-MCNC: 0.3 MG/DL — SIGNIFICANT CHANGE UP (ref 0.2–1.2)
BILIRUB UR-MCNC: NEGATIVE — SIGNIFICANT CHANGE UP
BUN SERPL-MCNC: 17 MG/DL — SIGNIFICANT CHANGE UP (ref 7–23)
CALCIUM SERPL-MCNC: 9.2 MG/DL — SIGNIFICANT CHANGE UP (ref 8.5–10.1)
CHLORIDE SERPL-SCNC: 104 MMOL/L — SIGNIFICANT CHANGE UP (ref 96–108)
CO2 SERPL-SCNC: 25 MMOL/L — SIGNIFICANT CHANGE UP (ref 22–31)
COLOR SPEC: SIGNIFICANT CHANGE UP
CREAT SERPL-MCNC: 1.1 MG/DL — SIGNIFICANT CHANGE UP (ref 0.5–1.3)
DIFF PNL FLD: NEGATIVE — SIGNIFICANT CHANGE UP
EGFR: 90 ML/MIN/1.73M2 — SIGNIFICANT CHANGE UP
EGFR: 90 ML/MIN/1.73M2 — SIGNIFICANT CHANGE UP
EOSINOPHIL # BLD AUTO: 0.12 K/UL — SIGNIFICANT CHANGE UP (ref 0–0.5)
EOSINOPHIL NFR BLD AUTO: 1.6 % — SIGNIFICANT CHANGE UP (ref 0–6)
ETHANOL SERPL-MCNC: <10 MG/DL — SIGNIFICANT CHANGE UP (ref 0–10)
FLUAV AG NPH QL: SIGNIFICANT CHANGE UP
FLUBV AG NPH QL: SIGNIFICANT CHANGE UP
GLUCOSE SERPL-MCNC: 108 MG/DL — HIGH (ref 70–99)
GLUCOSE UR QL: NEGATIVE MG/DL — SIGNIFICANT CHANGE UP
HCT VFR BLD CALC: 46.2 % — SIGNIFICANT CHANGE UP (ref 39–50)
HGB BLD-MCNC: 14.8 G/DL — SIGNIFICANT CHANGE UP (ref 13–17)
IMM GRANULOCYTES NFR BLD AUTO: 0.7 % — SIGNIFICANT CHANGE UP (ref 0–0.9)
KETONES UR-MCNC: 15 MG/DL
LEUKOCYTE ESTERASE UR-ACNC: ABNORMAL
LYMPHOCYTES # BLD AUTO: 3.36 K/UL — HIGH (ref 1–3.3)
LYMPHOCYTES # BLD AUTO: 44.7 % — HIGH (ref 13–44)
MCHC RBC-ENTMCNC: 25 PG — LOW (ref 27–34)
MCHC RBC-ENTMCNC: 32 G/DL — SIGNIFICANT CHANGE UP (ref 32–36)
MCV RBC AUTO: 77.9 FL — LOW (ref 80–100)
MONOCYTES # BLD AUTO: 0.66 K/UL — SIGNIFICANT CHANGE UP (ref 0–0.9)
MONOCYTES NFR BLD AUTO: 8.8 % — SIGNIFICANT CHANGE UP (ref 2–14)
NEUTROPHILS # BLD AUTO: 3.3 K/UL — SIGNIFICANT CHANGE UP (ref 1.8–7.4)
NEUTROPHILS NFR BLD AUTO: 43.8 % — SIGNIFICANT CHANGE UP (ref 43–77)
NITRITE UR-MCNC: NEGATIVE — SIGNIFICANT CHANGE UP
NRBC BLD AUTO-RTO: 0 /100 WBCS — SIGNIFICANT CHANGE UP (ref 0–0)
PCP SPEC-MCNC: SIGNIFICANT CHANGE UP
PH UR: 6 — SIGNIFICANT CHANGE UP (ref 5–8)
PLATELET # BLD AUTO: 238 K/UL — SIGNIFICANT CHANGE UP (ref 150–400)
POTASSIUM SERPL-MCNC: 4.2 MMOL/L — SIGNIFICANT CHANGE UP (ref 3.5–5.3)
POTASSIUM SERPL-SCNC: 4.2 MMOL/L — SIGNIFICANT CHANGE UP (ref 3.5–5.3)
PROT SERPL-MCNC: 7.5 G/DL — SIGNIFICANT CHANGE UP (ref 6–8.3)
PROT UR-MCNC: SIGNIFICANT CHANGE UP MG/DL
RBC # BLD: 5.93 M/UL — HIGH (ref 4.2–5.8)
RBC # FLD: 15.4 % — HIGH (ref 10.3–14.5)
RSV RNA NPH QL NAA+NON-PROBE: SIGNIFICANT CHANGE UP
SALICYLATES SERPL-MCNC: <1.7 MG/DL — LOW (ref 2.8–20)
SARS-COV-2 RNA SPEC QL NAA+PROBE: SIGNIFICANT CHANGE UP
SODIUM SERPL-SCNC: 134 MMOL/L — LOW (ref 135–145)
SOURCE RESPIRATORY: SIGNIFICANT CHANGE UP
SP GR SPEC: 1.02 — SIGNIFICANT CHANGE UP (ref 1–1.03)
TSH SERPL-MCNC: 11.2 UIU/ML — HIGH (ref 0.36–3.74)
UROBILINOGEN FLD QL: 1 MG/DL — SIGNIFICANT CHANGE UP (ref 0.2–1)
WBC # BLD: 7.52 K/UL — SIGNIFICANT CHANGE UP (ref 3.8–10.5)
WBC # FLD AUTO: 7.52 K/UL — SIGNIFICANT CHANGE UP (ref 3.8–10.5)

## 2025-04-05 PROCEDURE — 99285 EMERGENCY DEPT VISIT HI MDM: CPT

## 2025-04-05 PROCEDURE — 90792 PSYCH DIAG EVAL W/MED SRVCS: CPT | Mod: 95

## 2025-04-05 PROCEDURE — 99284 EMERGENCY DEPT VISIT MOD MDM: CPT

## 2025-04-05 PROCEDURE — 80053 COMPREHEN METABOLIC PANEL: CPT

## 2025-04-05 PROCEDURE — 81001 URINALYSIS AUTO W/SCOPE: CPT

## 2025-04-05 PROCEDURE — 80307 DRUG TEST PRSMV CHEM ANLYZR: CPT

## 2025-04-05 PROCEDURE — 99283 EMERGENCY DEPT VISIT LOW MDM: CPT

## 2025-04-05 PROCEDURE — 85025 COMPLETE CBC W/AUTO DIFF WBC: CPT

## 2025-04-05 PROCEDURE — 84443 ASSAY THYROID STIM HORMONE: CPT

## 2025-04-05 PROCEDURE — 36415 COLL VENOUS BLD VENIPUNCTURE: CPT

## 2025-04-05 PROCEDURE — 51798 US URINE CAPACITY MEASURE: CPT

## 2025-04-05 PROCEDURE — 87637 SARSCOV2&INF A&B&RSV AMP PRB: CPT

## 2025-04-05 RX ORDER — METFORMIN HYDROCHLORIDE 850 MG/1
1000 TABLET ORAL ONCE
Refills: 0 | Status: COMPLETED | OUTPATIENT
Start: 2025-04-05 | End: 2025-04-05

## 2025-04-05 RX ADMIN — METFORMIN HYDROCHLORIDE 1000 MILLIGRAM(S): 850 TABLET ORAL at 09:04

## 2025-04-05 NOTE — ED BEHAVIORAL HEALTH ASSESSMENT NOTE - HPI (INCLUDE ILLNESS QUALITY, SEVERITY, DURATION, TIMING, CONTEXT, MODIFYING FACTORS, ASSOCIATED SIGNS AND SYMPTOMS)
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, with PMHx of asthma, DM, BPH, urinary retention, hx testicular cancer s/p orchidectomy, GERD, hypothyroidism, HTN, HLD, myopia, PPHx of autism spectrum disorder, moderate intellectual disability; other diagnoses includes bipolar disorder, impulse control disorder, HAVEN, mood disorder NOS, PTSD, ADHD and factitious disorder, with multiple past inpatient psychiatric admissions (as per [employer] [facility] [employer] staff, most recently 4/2024), hx of NSSIB via cutting, charted longstanding hx of endorsing SI (situational: mostly stemming from discontent of living environment or situation) as well as hx of aggression toward group [facility] staff; BIBEMS activated by self for self harm and urinary retention, wanting to be admitted to the hospital, after being discharged from [facility] the same morning. Pt was cleared for d/c but refused to leave, hospital police called the PD as pt remained within hospital grounds and pt eventually returned to the ER as he reported SI again.    See note by the writer on the same date for the assessment. Current presentation is unchanged from the most recent assessment. Pt says that he is suicidal and has to stay in the hospital but cannot provide suicidality or any underlying symptomatology that could be alleviated through hospitalization. He seems to be utilizing suicidal statements to gain admission to the hospital. He says, "I cannot go home." he denies he has a psychiatric appointment and says it is not true when it is pointed out that his  confirmed his appointment on 4/22. When the accompanying staff also confirms the appointment date pt then says "I will not go!" Pt is behaviorally calm but upset about not gaining admission to the hospital. He does not clearly report a suicidal plan and current presentation appears to be patient's baseline. Patient is psychiatrically cleared for d/c as he is not likely to benefit from hospitalization. Further coordination with [employer] through SW consultation may be beneficial in facilitating d/c, along with giving the pt more time in the ER with frequent checkins for gradual transition to discharge.

## 2025-04-05 NOTE — ED BEHAVIORAL HEALTH ASSESSMENT NOTE - HPI (INCLUDE ILLNESS QUALITY, SEVERITY, DURATION, TIMING, CONTEXT, MODIFYING FACTORS, ASSOCIATED SIGNS AND SYMPTOMS)
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, with PMHx of asthma, DM, BPH, urinary retention, hx testicular cancer s/p orchidectomy, GERD, hypothyroidism, HTN, HLD, myopia, PPHx of autism spectrum disorder, moderate intellectual disability; other diagnoses includes bipolar disorder, impulse control disorder, HAVEN, mood disorder NOS, PTSD, ADHD and factitious disorder, with multiple past inpatient psychiatric admissions (as per shelter staff, most recently 4/2024), hx of NSSIB via cutting, charted longstanding hx of endorsing SI (situational: mostly stemming from discontent of living environment or situation) as well as hx of aggression toward group home staff; BIBEMS activated by self for self harm and urinary retention, wanting to be admitted to the hospital, after being discharged from [facility] the same morning. Of note, after endorsing involuntary urinary retention and refusing straight cath, pt was able to urinate spontaneously in the ER per EM attending.    Pt is calmly rocking while sitting with child-like concreteness and perseveration around staying in the hospital. Pt quickly endorses pan-symptoms, says "I am suicidal, homicidal, have voices and paranoia, manic and depressed." Pt cannot elaborate on any of the symptoms but points to his recent self-harm, first says he used to a blade but then he admits to using his credit card. Minor cuts on R shoulder and stomach area were seen. Upon attempting to explore symptoms, pt cannot describe any recent onset or stressors and notes that his symptoms has been going on for a long time. He then says he needs to be in the hospital and repeatedly asks "send me to psych." He says he has a new psychiatrist at the group home but he has not seen them yet. He says he is bored at the group home and there is nothing to do other than sleep. Pt then seems to feel better and agrees to stay in the ER to calm down further and wants to return to the group home. Chart was reviewed, pt's current presentation is consistent with recent visits where he seeks admission and he does not appear to have active symptoms of major depression, bipolar disorder or psychosis requiring inpatient level of care. Pt has superficial wounds from chronic self-injurious behaviors and his nSSIB and chronic suicidality are unlikely to improve if admitted and may potentially worsen due to significant change in his routine.     Spoke with  who reported that pt does not need to be in the hospital and he is at baseline frequently seeking hospitalization after something unnerves him. His triggers are elusive because he might self-harm and try to get himself hospitalized even after having a good day. No signs of depression/anxiety, bipolar or psychosis reported. Pt has a new psychiatrist but he missed his appointment last week, per the manager this is pt's baseline behavior. His next psychiatry appt is on 4/22 and he has weekly therapy appointments.     Pt presents at his baseline and his current reported symptoms are not in the context of an acute exacerbation of psychiatric illness but rather a stress reaction to his discomfort with his living situation and perhaps the change in his treatment he is utilizing symptom endorsement to gain access to psychiatric admission in order not to return to the group home. This is also supported by pt refusing to explore addressing stressors related to the group home better and refusing further observation in the ER but demanding to be admitted right away. He is likely to worsen from psychiatric admission as this will reinforce avoidance behaviors and he is psychiatrically cleared for d/c.

## 2025-04-05 NOTE — ED BEHAVIORAL HEALTH ASSESSMENT NOTE - DESCRIPTION
per chart review domiciled at [facility] since 9/2024, previously lived in group home in [place], pt reports being transferred d/t bullying asthma, DM, BPH, urinary retention, hx testicular cancer s/p orchidectomy, GERD, hypothyroidism, HTN, HLD, myopia Pt in good behavioral control in ED  Vital Signs Last 24 Hrs  T(C): 36.6 (04-05-25 @ 00:10), Max: 36.6 (04-05-25 @ 00:10)  T(F): 97.9 (04-05-25 @ 00:10), Max: 97.9 (04-05-25 @ 00:10)  HR: 98 (04-05-25 @ 00:10) (98 - 98)  BP: 104/75 (04-05-25 @ 00:10) (104/75 - 104/75)  BP(mean): --  RR: 18 (04-05-25 @ 00:10) (18 - 18)  SpO2: 97% (04-05-25 @ 00:10) (97% - 97%)

## 2025-04-05 NOTE — ED BEHAVIORAL HEALTH ASSESSMENT NOTE - NSBHMSEBODY_PSY_A_CORE
pls call- please make appt for follow  Up in the next few weeks to recheck thyroid levels. Ultrasound shows fairly stable thyroid nodules. Obese

## 2025-04-05 NOTE — ED ADULT NURSE NOTE - CAS TRG GEN SKIN COLOR
Pt is sleeping at this time and has slept 3.5 hours uninterrupted due to late admit.  NAD.  Resp even & unlabored.  Pathways clear.  Q 15 minute safety checks ongoing.  All precautions maintained    Normal for race

## 2025-04-05 NOTE — ED ADULT TRIAGE NOTE - CHIEF COMPLAINT QUOTE
unable to void since the morning. Pt was dc from [facility] at 9AM for psych eval. pt arrived to group home stating he cannot urinate. Pt  states he feels his stomach is bloating and pain. pt states he does get catheterized in the ER to void. Pt noted with open wound R arm and states he used something to re-open old wound on R arm and L thigh. med hx asthma, obesity, GERD, impulse control disorder, moderate intellectual disability. c/o pelvic pain. aide with patient

## 2025-04-05 NOTE — ED ADULT NURSE REASSESSMENT NOTE - NS ED NURSE REASSESS COMMENT FT1
patient discharge discussed, pt refusing to be discharge back to group home. Refusing vitals to be taken. Transportation called for pt and aide, to wait in waiting room.

## 2025-04-05 NOTE — ED PROVIDER NOTE - NSFOLLOWUPINSTRUCTIONS_ED_ALL_ED_FT
Follow up with your doctors as previously directed within the next 2-3 days     Continue to take your medicines as previously prescribed    Return to the Emergency Department if you have any new or worsening symptoms

## 2025-04-05 NOTE — ED BEHAVIORAL HEALTH ASSESSMENT NOTE - DESCRIPTION
per chart review domiciled at [facility] since 9/2024, previously lived in group home in [place], pt reports being transferred d/t bullying asthma, DM, BPH, urinary retention, hx testicular cancer s/p orchidectomy, GERD, hypothyroidism, HTN, HLD, myopia Pt in good behavioral control in ED  Vital Signs Last 24 Hrs  T(C): 36.7 (04-05-25 @ 05:36), Max: 36.7 (04-05-25 @ 05:36)  T(F): 98 (04-05-25 @ 05:36), Max: 98 (04-05-25 @ 05:36)  HR: 120 (04-05-25 @ 05:36) (98 - 120)  BP: 143/89 (04-05-25 @ 05:36) (104/75 - 143/89)  BP(mean): --  RR: 18 (04-05-25 @ 05:36) (18 - 18)  SpO2: 97% (04-05-25 @ 05:36) (97% - 97%)

## 2025-04-05 NOTE — ED BEHAVIORAL HEALTH ASSESSMENT NOTE - CURRENT MEDICATION
list provided by [facility] on 3/13/25, and list reviewed, patient has been compliant with psychiatric medications (Buspirone 15mg qhS, Clonidine 0.3mg BID, Depakote 500mg at 2pm, 1000mg qhS, fluphenazine 75mg IM q4 weeks, remeron 15mg qHS, Remeron 7.5mg qHS, Risperdal 4mg at 2pm and 7pm

## 2025-04-05 NOTE — BH SAFETY PLAN - DISTRACTION PLACE 1
-- DO NOT REPLY / DO NOT REPLY ALL --  -- Message is from the [facility]--    Patient is requesting a medication refill - medication is on active medication list    Patient is currently OUT of the requested medication.    Was Medication Pended?  Yes.    Rx Name and Dose:      methylpheniDATE (RITALIN) 10 MG tablet    methylphenidate (CONCERTA) 54 MG CR tablet    Duration: 30 days    Pharmacy  [facility] [address] [place] [address] [address]    Patient confirmed the above pharmacy as correct?  Yes    Does this request need an existing or new prescription at a pharmacy to be sent to a new pharmacy location?   Yes - Existing medication prescription is at one pharmacy and needs to be sent to another pharmacy    Caller Information       Type Contact Phone    11/15/2021 05:18 PM CST Phone (Incoming) EDUIN ROYAL (Mother) [phone]          Alternative phone number: n/a    Turnaround time given to caller:   \"Your doctor's office is closed. This message will be sent to our nurse. They will return your call as soon as they review your message. (Calls after 10 PM will be returned tomorrow morning.)\"   Shopping

## 2025-04-05 NOTE — ED PROVIDER NOTE - PATIENT PORTAL LINK FT
You can access the FollowMyHealth Patient Portal offered by [facility] by registering at the following website: [email or web address]. By joining [facility]’s FollowMyHealth portal, you will also be able to view your health information using other applications (apps) compatible with our system.

## 2025-04-05 NOTE — ED BEHAVIORAL HEALTH ASSESSMENT NOTE - OTHER PAST PSYCHIATRIC HISTORY (INCLUDE DETAILS REGARDING ONSET, COURSE OF ILLNESS, INPATIENT/OUTPATIENT TREATMENT)
<<-----Click here for Discharge Medication Review multiple past inpatient psychiatric admissions (as per [facility] staff, most recently 4/2024, hx of cutting himself, charted longstanding hx of endorsing SI, no known prior suicide attempts or significant violence; in outpatient treatment at [facility]

## 2025-04-05 NOTE — ED PROVIDER NOTE - CLINICAL SUMMARY MEDICAL DECISION MAKING FREE TEXT BOX
34M PMH intellectual disability obesity asthma GERD factitious disorder autism type 2 diabetes who was just in the ED an hour ago for SI is now returning b/c he states that if he returns to the group home he will kill himself. He spoke to [employer] who left after pt stated he had SI. Security in the ED states they spoke to pt and he confirmed he had SI    Gen: NAD, non-toxic appearing, awake alert , group home aide at bedside  HEENT: EOMI, normal conjunctiva, oral mucosa moist  Lung: CTAB, no respiratory distress, no wheezes/rhonchi/rales B/L, speaking in full sentences  CV: RRR  Abd: soft, NT, ND, no guarding, no rigidity, no rebound tenderness  MSK: no visible deformities  Skin: Warm, well perfused    DDx includes but not limited to: likely pt using maladpative behavior for secondary gain. There is lower concern for decompensation of primary psych illness  Plan: pt will need reevaluation by telepsych and SW consult in AM. He already had psych labs drawn today so will hold off on reorder    See Progress Notes for further updates on ED Course

## 2025-04-05 NOTE — ED BEHAVIORAL HEALTH ASSESSMENT NOTE - SUMMARY
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, with PMHx of asthma, DM, BPH, urinary retention, hx testicular cancer s/p orchidectomy, GERD, hypothyroidism, HTN, HLD, myopia, PPHx of autism spectrum disorder, moderate intellectual disability; other diagnoses includes bipolar disorder, impulse control disorder, HAVEN, mood disorder NOS, PTSD, ADHD and factitious disorder, with multiple past inpatient psychiatric admissions (as per [facility] staff, most recently 4/2024), hx of NSSIB via cutting, charted longstanding hx of endorsing SI (situational: mostly stemming from discontent of living environment or situation) as well as hx of aggression toward group home staff; BIBEMS activated by self for self harm and urinary retention, wanting to be admitted to the hospital, after being discharged from [facility] the same morning. Of note, after endorsing involuntary urinary retention and refusing straight cath, pt was able to urinate spontaneously in the ER per EM attending.    Pt is calmly rocking while sitting with child-like concreteness and perseveration around staying in the hospital. Pt quickly endorses pan-symptoms, says "I am suicidal, homicidal, have voices and paranoia, manic and depressed." Pt cannot elaborate on any of the symptoms but points to his recent self-harm, first says he used to a blade but then he admits to using his credit card. Minor cuts on R shoulder and stomach area were seen. Upon attempting to explore symptoms, pt cannot describe any recent onset or stressors and notes that his symptoms has been going on for a long time. He then says he needs to be in the hospital and repeatedly asks "send me to psych." He says he has a new psychiatrist at the group home but he has not seen them yet. He says he is bored at the group home and there is nothing to do other than sleep. Pt then seems to feel better and agrees to stay in the ER to calm down further and wants to return to the group home. Chart was reviewed, pt's current presentation is consistent with recent visits where he seeks admission and he does not appear to have active symptoms of major depression, bipolar disorder or psychosis requiring inpatient level of care. Pt has superficial wounds from chronic self-injurious behaviors and his nSSIB and chronic suicidality are unlikely to improve if admitted and may potentially worsen due to significant change in his routine.     Spoke with  who reported that pt does not need to be in the hospital and he is at baseline frequently seeking hospitalization after something unnerves him. His triggers are elusive because he might self-harm and try to get himself hospitalized even after having a good day. No signs of depression/anxiety, bipolar or psychosis reported. Pt has a new psychiatrist but he missed his appointment last week, per the manager this is pt's baseline behavior. His next psychiatry appt is on 4/22 and he has weekly therapy appointments.     Pt presents at his baseline and his current reported symptoms are not in the context of an acute exacerbation of psychiatric illness but rather a stress reaction to his discomfort with his living situation and perhaps the change in his treatment he is utilizing symptom endorsement to gain access to psychiatric admission in order not to return to the group home. This is also supported by pt refusing to explore addressing stressors related to the group home better and refusing further observation in the ER but demanding to be admitted right away. He is likely to worsen from psychiatric admission as this will reinforce avoidance behaviors and he is psychiatrically cleared for d/c.

## 2025-04-05 NOTE — ED PROVIDER NOTE - PATIENT PORTAL LINK FT
You can access the FollowMyHealth Patient Portal offered by [facility] by registering at the following website: [email or web address]. By joining [facility]’s FollowMyHealth portal, you will also be able to view your health information using other applications (apps) compatible with our system.

## 2025-04-05 NOTE — ED BEHAVIORAL HEALTH ASSESSMENT NOTE - RISK ASSESSMENT
Pt is at low imminent risk of suicide or significant violence; however, he is at chronic elevated risk or harm to self and others. His risk factors include  ASD, impulse control disorder, ID, mood symptoms; as noted above he has maladaptive coping skills and low frustration tolerance that increase his risk chronically as he generally acts out through aggression and self harm. However patient has many protective factors that mitigate his risk factors. He is help seeking, future oriented, has positive therapeutic relationships, he is complaint with medication, and he has 24 1:1 supervision in a group home.

## 2025-04-05 NOTE — ED BEHAVIORAL HEALTH ASSESSMENT NOTE - OTHER PAST PSYCHIATRIC HISTORY (INCLUDE DETAILS REGARDING ONSET, COURSE OF ILLNESS, INPATIENT/OUTPATIENT TREATMENT)
multiple past inpatient psychiatric admissions (as per [facility] staff, most recently 4/2024, hx of cutting himself, charted longstanding hx of endorsing SI, no known prior suicide attempts or significant violence; in outpatient treatment at [facility]

## 2025-04-05 NOTE — ED BEHAVIORAL HEALTH ASSESSMENT NOTE - RISK ASSESSMENT
Pt is at low imminent risk of suicide or significant violence; however, he is at chronic elevated risk of harm to self and others. His risk factors include  ASD, impulse control disorder, ID, mood symptoms; as noted above he has maladaptive coping skills and low frustration tolerance that increase his risk chronically as he generally acts out through aggression and self harm. However patient has many protective factors that mitigate his risk factors. He is help seeking, future oriented, has positive therapeutic relationships, he is complaint with medication, and he has 24 1:1 supervision in a group home.

## 2025-04-05 NOTE — ED BEHAVIORAL HEALTH ASSESSMENT NOTE - SAFETY PLAN ADDT'L DETAILS
Safety plan discussed with.../Education provided regarding environmental safety / lethal means restriction/Provision of National Suicide Prevention Lifeline 0-208-507-JQQS (5795)

## 2025-04-05 NOTE — ED PROVIDER NOTE - CLINICAL SUMMARY MEDICAL DECISION MAKING FREE TEXT BOX
34M PMH intellectual disability obesity asthma GERD factitious disorder autism type 2 diabetes p/w group home aide at bedside for inability to urinate . Pt states he feels like he wants to open his old wounds to his arms and abdomen a few days ago. Denies hematuria, dysuria, fevers 34M PMH intellectual disability obesity asthma GERD factitious disorder autism type 2 diabetes p/w group home aide at bedside for inability to urinate . Pt states he feels like he wants to open his old wounds to his arms and abdomen a few days ago. Denies hematuria, dysuria, fevers    Gen: NAD, non-toxic appearing, awake alert   HEENT: normal conjunctiva, oral mucosa moist  Lung: CTAB, no respiratory distress, no wheezes/rhonchi/rales B/L, speaking in full sentences  CV: RRR  Abd: soft, NT, ND, no guarding, no rigidity, no rebound tenderness  MSK: no visible deformities, ROM normal in UE/LE  Skin: Warm, well perfused    DDx includes but not limited to: low concern for urinary obstruction/retention. Will eval for anne-marie, lyte derangements, psych clearance labs  Plan: labs, telepsych c/s, reassess symptoms    See Progress Notes for further updates on ED Course

## 2025-04-05 NOTE — ED BEHAVIORAL HEALTH ASSESSMENT NOTE - SAFETY PLAN ADDT'L DETAILS
Safety plan discussed with.../Education provided regarding environmental safety / lethal means restriction/Provision of National Suicide Prevention Lifeline 7-939-391-OZYC (5423)

## 2025-04-05 NOTE — ED ADULT NURSE NOTE - OBJECTIVE STATEMENT
Pt received in 3B, staff member at bedside. Pt is a&ox3, presents with concern for not urinating since this morning - pt has hx of mckeon placement due to same. Pt known to have attention seeking behaviors, stated by staff at group home. Bladder scan shows 170 mL urine. During triage pt endorsed that he was picking at scabs that he has on his body. Pt denies SI/HI at this time. Pt was seen at [facility] today and cleared by their psychiatrist. Pt states during interview that he wants to get admitted to psych [facility], but to another nurse he states that he doesn't want to go. Pt also endorsing that he wants a mckeon because he can't pee. Pt has numerous sites of self harm from numerous times. Pt placed infront of nurses station and staff member from group [place] present at all times. Pt under enhanced supervision at this time.

## 2025-04-05 NOTE — ED BEHAVIORAL HEALTH ASSESSMENT NOTE - CURRENT MEDICATION
[facility]: list provided by [facility] on 3/13/25, and list reviewed, patient has been compliant with psychiatric medications (Buspirone 15mg qhS, Clonidine 0.3mg BID, Depakote 500mg at 2pm, 1000mg qhS, fluphenazine 75mg IM q4 weeks, remeron 15mg qHS, Remeron 7.5mg qHS, Risperdal 4mg at 2pm and 7pm

## 2025-04-05 NOTE — ED PROVIDER NOTE - PROGRESS NOTE DETAILS
I spoke to telepsych: they informed me that one of their senior psychiatrists will eval the pt within the next 2 hrs. I also placed SW c/s at this time for assistance w/ dispo planning I spoke to telepsych. They have the same assessment: pt is upset b/c he is being discharged, but their plan does not change. he is still cleared on telepsych's end

## 2025-04-05 NOTE — ED ADULT TRIAGE NOTE - CHIEF COMPLAINT QUOTE
pt was discharge earlier and came back with family member and  to be seen and states he did not want to go and feels suicidal

## 2025-04-05 NOTE — ED PROVIDER NOTE - PROGRESS NOTE DETAILS
pt was bladder scanned. had 180cc in bladder after drinking fluids. Informed him we can straight cath him but no mckeon is indicated at this time. he is adamant about mckeon b/c he wants admission. he also states the straight cath hurts more than mckeon. I told him we can place mckeon and then remove it. however he does not want either of those    telepsych will place pt on list pt was able to urinate by himself. after voiding, bladder scan showed 50cc. pt does not need a mckeon or a straight cath at this time    I spoke to telepsych who evaluated pt and cleared him to go back to his group home. Telepsych attg was also able to speak to staff at the group home and they agree that pt displays manipulative behavior and attention seeking behavior. I informed pt of this: he again is displaying maladaptive, attention seeking behavior. he states he does not want go to back to his group despite no psychiatric reason or medical reason for admission pt was able to urinate by himself. after voiding, bladder scan showed 50cc. pt does not need a mckeon or a straight cath at this time    I spoke to telepsych who evaluated pt and cleared him to go back to his group home. Telepsych attg was also able to speak to staff at the group home and they agree that pt displays manipulative behavior and attention seeking behavior. I informed pt of this: he again is displaying maladaptive, attention seeking behavior. he states he does not want go to back to his group despite no psychiatric reason or medical reason for admission. charge ANALI Valencia spoke to pt and explained that there is no reason for admission: Charge RN will arrange for taxi back to his group home with the aide at bedside

## 2025-04-05 NOTE — ED ADULT NURSE NOTE - NSFALLUNIVINTERV_ED_ALL_ED
Bed/Stretcher in lowest position, wheels locked, appropriate side rails in place/Call bell, personal items and telephone in reach/Instruct patient to call for assistance before getting out of bed/chair/stretcher/Non-slip footwear applied when patient is off stretcher/[place] to call system/Physically safe environment - no spills, clutter or unnecessary equipment/Purposeful proactive rounding/Room/bathroom lighting operational, light cord in reach

## 2025-04-05 NOTE — ED ADULT NURSE NOTE - OBJECTIVE STATEMENT
Pt was cleared by tele psych and medically cleared by ER md and refused to leave and  called with security and pt wouldn't go home. Pt retuned after multiple attempts by security and police to be re seen and left here for the hospital to repeat the same exact care which is he's discharged and needs to go back home. He does this over and over in his history because of his condition and is abusing the system. Pt doesn't want to go back to his group home an does not likes the hospital space better and his aide at the group home is with him and knows this is what he does all the time.

## 2025-04-05 NOTE — ED ADULT NURSE NOTE - CHIEF COMPLAINT QUOTE
unable to void since the morning. Pt was dc from [facility] at 9AM for psych eval. pt arrived to group home stating he cannot urinate. Pt  states he feels his stomach is bloating and pain. pt states he does get catheterized in the ER to void. Pt noted with open wound R arm and states he used something to re-open old wound on R arm and L thigh. med hx asthma, obesity, GERD, impulse control disorder, moderate intellectual disability. c/o pelvic pain. aide with patient

## 2025-04-05 NOTE — ED ADULT NURSE NOTE - TEMPLATE
Assessment/Plan:       Diagnoses and all orders for this visit:    Benign essential hypertension    Cervical radiculitis    Cervical spinal stenosis    Screen for colon cancer  -     Ambulatory referral to Gastroenterology; Future    Other orders  -     diclofenac sodium (VOLTAREN) 1 %; APPLY 4 GRAMS ON THE SKIN 4 TIMES A DAY  DO NOT APPLY TO BROKEN OR INFLAMED SKIN        Benign essential hypertension  Controlled, continue amlodipine    Cervical radiculitis  Continue physical therapy, follow up with neurosurgeon as scheduled  Cervical spinal stenosis    Continue physical therapy  continues amlodipine, follow up with his physical therapist as scheduled  Will see him back in 6 months time, we will call him with his blood work results in the interim  Subjective:      Patient ID: Stephanie Talavera is a 78 y o  male  Stephanie Talavera comes in today for checkup  No problems taking medications, no compliance issues  Not experiencing any side effects  Hypertension is well controlled, taking medications as prescribed  No compliance issues  No side effects  Checks blood pressures sporadically, and controlled  He is currently doing physical therapy as recommended by his neurosurgeon, he states that his neck pain is improving, he no longer takes any medications for this except for Voltaren gel  He is due to get his cholesterol and PSA done he has not done this yet  His blood work from May was reviewed including his blood sugar  The following portions of the patient's history were reviewed and updated as appropriate:   He has a past medical history of Hypertension and Renal calculi ,   does not have any pertinent problems on file  ,   has a past surgical history that includes Anterior fusion cervical spine (25 years ago); Cervical fusion; Gallbladder surgery; Kidney surgery; and Prostate surgery  ,  family history includes Cancer in his father; Diabetes in his sister;  Heart disease in his mother; No Known Problems in his brother, maternal grandfather, maternal grandmother, paternal grandfather, and paternal grandmother; Stroke in his mother  ,   reports that he has never smoked  He has never used smokeless tobacco  He reports that he does not drink alcohol or use drugs  ,  is allergic to sulfamethoxazole-trimethoprim; gadolinium derivatives; cortisone; iodinated diagnostic agents; and hydrocortisone     Current Outpatient Prescriptions   Medication Sig Dispense Refill    amLODIPine (NORVASC) 5 mg tablet Take 1 tablet (5 mg total) by mouth daily 30 tablet 3    aspirin 81 MG tablet Take by mouth      celecoxib (CELEBREX) 100 mg capsule Take by mouth      cholecalciferol (VITAMIN D3) 1,000 units tablet Take 1 tablet by mouth daily      coenzyme Q-10 100 MG capsule Take 1 capsule by mouth Daily      COLLAGEN PO Take 40 mg by mouth      diazepam (VALIUM) 5 mg tablet Take 5 mg by mouth      diclofenac sodium (VOLTAREN) 1 % APPLY 4 GRAMS ON THE SKIN 4 TIMES A DAY  DO NOT APPLY TO BROKEN OR INFLAMED SKIN  1    Glucosamine 750 MG TABS Take 1 tablet by mouth daily      lidocaine (XYLOCAINE) 5 % ointment APPLY 1 APPLICATION TO AFFECTED AREA EVERY 8 HOURS AS NEEDED  2    Milk Thistle 1000 MG CAPS Take 1 capsule by mouth daily      Multiple Vitamins-Minerals (OSTEO COMPLEX PO) Take by mouth      omeprazole (PRILOSEC) 20 mg delayed release capsule Take by mouth      PROBIOTIC PRODUCT PO Take by mouth      TURMERIC PO Take 400 mg by mouth       No current facility-administered medications for this visit  Review of Systems   Constitutional: Negative for chills, fatigue and fever  HENT: Negative for congestion, ear pain, hearing loss, postnasal drip, rhinorrhea and sore throat  Eyes: Negative for pain and visual disturbance  Respiratory: Negative for chest tightness, shortness of breath and wheezing  Cardiovascular: Negative for chest pain and leg swelling     Gastrointestinal: Negative for abdominal distention, abdominal pain, constipation, diarrhea and vomiting  Endocrine: Negative for cold intolerance and heat intolerance  Genitourinary: Negative for difficulty urinating, frequency and urgency  Musculoskeletal: Positive for back pain (  Neck pain)  Negative for arthralgias and gait problem  Skin: Negative for color change  Neurological: Negative for dizziness, tremors, syncope, numbness and headaches  Hematological: Negative for adenopathy  Psychiatric/Behavioral: Negative for agitation, confusion and sleep disturbance  The patient is not nervous/anxious  Objective:  Vitals:    08/27/18 1127   BP: 130/80   Pulse: 96   SpO2: 97%   Weight: 92 1 kg (203 lb)   Height: 5' 10" (1 778 m)     Body mass index is 29 13 kg/m²  Physical Exam   Constitutional: He is oriented to person, place, and time  He appears well-developed and well-nourished  HENT:   Head: Normocephalic  Mouth/Throat: Oropharynx is clear and moist    Eyes: Conjunctivae are normal  No scleral icterus  Neck: Normal range of motion  No thyromegaly present  Cardiovascular: Normal rate, regular rhythm and normal heart sounds  No murmur heard  Pulmonary/Chest: Effort normal and breath sounds normal  No respiratory distress  He has no wheezes  Abdominal: Soft  Bowel sounds are normal  He exhibits no distension  There is no tenderness  Musculoskeletal: Normal range of motion  He exhibits no edema or tenderness  Lymphadenopathy:     He has no cervical adenopathy  Neurological: He is alert and oriented to person, place, and time  No cranial nerve deficit  Skin: Skin is warm and dry  No rash noted  No pallor  Psychiatric: He has a normal mood and affect  His behavior is normal    Nursing note and vitals reviewed  General

## 2025-04-08 NOTE — ED PROVIDER NOTE - COVID-19  TEST TYPE
FLUoxetine (PROzac) 20 MG capsule 90 capsule 3 10/10/2024 --    Sig - Route: Take 1 capsule by mouth daily. - Oral    Sent to pharmacy as: FLUoxetine HCl 20 MG Oral Capsule (PROzac)    Class: Eprescribe    E-Prescribing Status: Receipt confirmed by pharmacy (10/10/2024 10:19 AM CDT)      
MOLECULAR PCR

## 2025-04-08 NOTE — ED ADULT NURSE NOTE - FINAL NURSING ELECTRONIC SIGNATURE
Problem: At Risk for Falls  Goal: Patient does not fall  Outcome: Monitoring/Evaluating progress  Goal: Patient takes action to control fall-related risks  Outcome: Monitoring/Evaluating progress      26-Mar-2025 23:53

## 2025-04-22 NOTE — ED ADULT NURSE NOTE - NS ED NOTE ABUSE SUSPICION NEGLECT YN
\"LABA1C\"  TSH: No results found for: \"TSH\"  Troponin:   Lab Results   Component Value Date/Time    TROPONINT <0.010 07/10/2015 12:05 AM     Lactic Acid: No results for input(s): \"LACTA\" in the last 72 hours.  BNP: No results for input(s): \"PROBNP\" in the last 72 hours.  UA:  Lab Results   Component Value Date/Time    NITRU NEGATIVE 02/17/2024 04:07 PM    COLORU YELLOW 02/17/2024 04:07 PM    PHUR 6.0 02/17/2024 04:07 PM    WBCUA 2 02/17/2024 04:07 PM    RBCUA 1 02/17/2024 04:07 PM    MUCUS RARE 02/17/2024 04:07 PM    TRICHOMONAS NONE SEEN 02/17/2024 04:07 PM    BACTERIA NEGATIVE 02/17/2024 04:07 PM    CLARITYU CLEAR 02/17/2024 04:07 PM    LEUKOCYTESUR NEGATIVE 02/17/2024 04:07 PM    UROBILINOGEN 0.2 02/17/2024 04:07 PM    BILIRUBINUR NEGATIVE 02/17/2024 04:07 PM    BLOODU SMALL NUMBER OR AMOUNT OBSERVED 02/17/2024 04:07 PM    GLUCOSEU NEGATIVE 02/17/2024 04:07 PM    KETUA TRACE 02/17/2024 04:07 PM     Urine Cultures: No results found for: \"LABURIN\"  Blood Cultures: No results found for: \"BC\"  No results found for: \"BLOODCULT2\"  Organism: No results found for: \"ORG\"    Imaging/Diagnostics Last 24 Hours   CT ABDOMEN PELVIS W IV CONTRAST Additional Contrast? None  Result Date: 4/21/2025  PROCEDURE: CT ABDOMEN PELVIS W IV CONTRAST DATE OF EXAM:  4/21/2025 18:14 DEMOGRAPHICS: 34 years old Female INDICATION: ? COLITIS Contrast utilized and the relevant clinical information: IOPAMIDOL 76 % IV SOLN 75mL COMPARISON: No existing relevant imaging study corresponding to the same anatomical region is available. TECHNIQUE: Contiguous axial slices of the abdomen and pelvis were submitted after the IV administration of contrast. No oral contrast was utilized. Additional coronal reformatted images were submitted.  DOSE OPTIMIZATION: CT radiation dose optimization techniques (automated exposure  control, and use of iterative reconstruction techniques, or adjustment of the mA and/or kV according to patient size) were used to limit 
No

## 2025-04-24 ENCOUNTER — NON-APPOINTMENT (OUTPATIENT)
Age: 35
End: 2025-04-24

## 2025-04-24 NOTE — ED PROVIDER NOTE - DATE/TIME 2
Patient discharge and wound care instructions  Adrien Tobin  4/24/2025     Discharge from clinic, follow-up as needed or directed by podiatry    Follow-up at least every quarter with dr. Lewis for monitoring    Moisturize feet (except between the toes)    Twice daily foot inspection, call with any callus, blistering, or redness    Wear white socks    Always where shoes  
10-Sudhakar-2024 16:27

## 2025-04-25 ENCOUNTER — APPOINTMENT (OUTPATIENT)
Dept: UROLOGY | Facility: CLINIC | Age: 35
End: 2025-04-25

## 2025-04-25 ENCOUNTER — EMERGENCY (EMERGENCY)
Facility: HOSPITAL | Age: 35
LOS: 0 days | Discharge: HOME | End: 2025-04-25
Attending: EMERGENCY MEDICINE
Payer: MEDICAID

## 2025-04-25 VITALS
HEART RATE: 79 BPM | SYSTOLIC BLOOD PRESSURE: 142 MMHG | WEIGHT: 268.52 LBS | RESPIRATION RATE: 18 BRPM | DIASTOLIC BLOOD PRESSURE: 88 MMHG | TEMPERATURE: 98 F | OXYGEN SATURATION: 98 %

## 2025-04-25 VITALS
HEART RATE: 90 BPM | DIASTOLIC BLOOD PRESSURE: 77 MMHG | TEMPERATURE: 98 F | SYSTOLIC BLOOD PRESSURE: 114 MMHG | OXYGEN SATURATION: 98 % | RESPIRATION RATE: 18 BRPM

## 2025-04-25 DIAGNOSIS — Z90.79 ACQUIRED ABSENCE OF OTHER GENITAL ORGAN(S): Chronic | ICD-10-CM

## 2025-04-25 DIAGNOSIS — F79 UNSPECIFIED INTELLECTUAL DISABILITIES: ICD-10-CM

## 2025-04-25 LAB
ALBUMIN SERPL ELPH-MCNC: 3.4 G/DL — SIGNIFICANT CHANGE UP (ref 3.3–5)
ALP SERPL-CCNC: 63 U/L — SIGNIFICANT CHANGE UP (ref 40–120)
ALT FLD-CCNC: 50 U/L — SIGNIFICANT CHANGE UP (ref 12–78)
AMPHET UR-MCNC: NEGATIVE — SIGNIFICANT CHANGE UP
ANION GAP SERPL CALC-SCNC: 6 MMOL/L — SIGNIFICANT CHANGE UP (ref 5–17)
APAP SERPL-MCNC: <2 UG/ML — LOW (ref 10–30)
AST SERPL-CCNC: 29 U/L — SIGNIFICANT CHANGE UP (ref 15–37)
BARBITURATES UR SCN-MCNC: NEGATIVE — SIGNIFICANT CHANGE UP
BASOPHILS # BLD AUTO: 0.04 K/UL — SIGNIFICANT CHANGE UP (ref 0–0.2)
BASOPHILS NFR BLD AUTO: 0.4 % — SIGNIFICANT CHANGE UP (ref 0–2)
BENZODIAZ UR-MCNC: NEGATIVE — SIGNIFICANT CHANGE UP
BILIRUB SERPL-MCNC: 0.4 MG/DL — SIGNIFICANT CHANGE UP (ref 0.2–1.2)
BUN SERPL-MCNC: 15 MG/DL — SIGNIFICANT CHANGE UP (ref 7–23)
CALCIUM SERPL-MCNC: 9.3 MG/DL — SIGNIFICANT CHANGE UP (ref 8.5–10.1)
CHLORIDE SERPL-SCNC: 104 MMOL/L — SIGNIFICANT CHANGE UP (ref 96–108)
CO2 SERPL-SCNC: 27 MMOL/L — SIGNIFICANT CHANGE UP (ref 22–31)
COCAINE METAB.OTHER UR-MCNC: NEGATIVE — SIGNIFICANT CHANGE UP
CREAT SERPL-MCNC: 0.9 MG/DL — SIGNIFICANT CHANGE UP (ref 0.5–1.3)
EGFR: 114 ML/MIN/1.73M2 — SIGNIFICANT CHANGE UP
EGFR: 114 ML/MIN/1.73M2 — SIGNIFICANT CHANGE UP
EOSINOPHIL # BLD AUTO: 0.12 K/UL — SIGNIFICANT CHANGE UP (ref 0–0.5)
EOSINOPHIL NFR BLD AUTO: 1.3 % — SIGNIFICANT CHANGE UP (ref 0–6)
ETHANOL SERPL-MCNC: <10 MG/DL — SIGNIFICANT CHANGE UP (ref 0–10)
FENTANYL UR QL SCN: NEGATIVE — SIGNIFICANT CHANGE UP
FLUAV AG NPH QL: SIGNIFICANT CHANGE UP
FLUBV AG NPH QL: SIGNIFICANT CHANGE UP
GLUCOSE SERPL-MCNC: 126 MG/DL — HIGH (ref 70–99)
HCT VFR BLD CALC: 42.8 % — SIGNIFICANT CHANGE UP (ref 39–50)
HGB BLD-MCNC: 13.7 G/DL — SIGNIFICANT CHANGE UP (ref 13–17)
IMM GRANULOCYTES # BLD AUTO: 0.05 K/UL — SIGNIFICANT CHANGE UP (ref 0–0.07)
IMM GRANULOCYTES NFR BLD AUTO: 0.5 % — SIGNIFICANT CHANGE UP (ref 0–0.9)
LYMPHOCYTES # BLD AUTO: 3.3 K/UL — SIGNIFICANT CHANGE UP (ref 1–3.3)
LYMPHOCYTES NFR BLD AUTO: 34.8 % — SIGNIFICANT CHANGE UP (ref 13–44)
MCHC RBC-ENTMCNC: 25.2 PG — LOW (ref 27–34)
MCHC RBC-ENTMCNC: 32 G/DL — SIGNIFICANT CHANGE UP (ref 32–36)
MCV RBC AUTO: 78.7 FL — LOW (ref 80–100)
METHADONE UR-MCNC: NEGATIVE — SIGNIFICANT CHANGE UP
MONOCYTES # BLD AUTO: 0.72 K/UL — SIGNIFICANT CHANGE UP (ref 0–0.9)
MONOCYTES NFR BLD AUTO: 7.6 % — SIGNIFICANT CHANGE UP (ref 2–14)
NEUTROPHILS # BLD AUTO: 5.24 K/UL — SIGNIFICANT CHANGE UP (ref 1.8–7.4)
NEUTROPHILS NFR BLD AUTO: 55.4 % — SIGNIFICANT CHANGE UP (ref 43–77)
NRBC # BLD AUTO: 0 K/UL — SIGNIFICANT CHANGE UP (ref 0–0)
NRBC # FLD: 0 K/UL — SIGNIFICANT CHANGE UP (ref 0–0)
NRBC BLD AUTO-RTO: 0 /100 WBCS — SIGNIFICANT CHANGE UP (ref 0–0)
OPIATES UR-MCNC: NEGATIVE — SIGNIFICANT CHANGE UP
PCP SPEC-MCNC: SIGNIFICANT CHANGE UP
PCP UR-MCNC: NEGATIVE — SIGNIFICANT CHANGE UP
PLATELET # BLD AUTO: 208 K/UL — SIGNIFICANT CHANGE UP (ref 150–400)
PMV BLD: 11.2 FL — SIGNIFICANT CHANGE UP (ref 7–13)
POTASSIUM SERPL-MCNC: 4.3 MMOL/L — SIGNIFICANT CHANGE UP (ref 3.5–5.3)
POTASSIUM SERPL-SCNC: 4.3 MMOL/L — SIGNIFICANT CHANGE UP (ref 3.5–5.3)
PROT SERPL-MCNC: 7 GM/DL — SIGNIFICANT CHANGE UP (ref 6–8.3)
RBC # BLD: 5.44 M/UL — SIGNIFICANT CHANGE UP (ref 4.2–5.8)
RBC # FLD: 15.6 % — HIGH (ref 10.3–14.5)
RSV RNA NPH QL NAA+NON-PROBE: SIGNIFICANT CHANGE UP
SALICYLATES SERPL-MCNC: <1.7 MG/DL — LOW (ref 2.8–20)
SARS-COV-2 RNA SPEC QL NAA+PROBE: SIGNIFICANT CHANGE UP
SODIUM SERPL-SCNC: 137 MMOL/L — SIGNIFICANT CHANGE UP (ref 135–145)
SOURCE RESPIRATORY: SIGNIFICANT CHANGE UP
THC UR QL: NEGATIVE — SIGNIFICANT CHANGE UP
WBC # BLD: 9.47 K/UL — SIGNIFICANT CHANGE UP (ref 3.8–10.5)
WBC # FLD AUTO: 9.47 K/UL — SIGNIFICANT CHANGE UP (ref 3.8–10.5)

## 2025-04-25 PROCEDURE — 90792 PSYCH DIAG EVAL W/MED SRVCS: CPT | Mod: 95

## 2025-04-25 PROCEDURE — 36415 COLL VENOUS BLD VENIPUNCTURE: CPT

## 2025-04-25 PROCEDURE — 80307 DRUG TEST PRSMV CHEM ANLYZR: CPT

## 2025-04-25 PROCEDURE — 85025 COMPLETE CBC W/AUTO DIFF WBC: CPT

## 2025-04-25 PROCEDURE — 70450 CT HEAD/BRAIN W/O DYE: CPT | Mod: 26

## 2025-04-25 PROCEDURE — 99285 EMERGENCY DEPT VISIT HI MDM: CPT | Mod: 25

## 2025-04-25 PROCEDURE — 0241U: CPT

## 2025-04-25 PROCEDURE — 93010 ELECTROCARDIOGRAM REPORT: CPT

## 2025-04-25 PROCEDURE — 70450 CT HEAD/BRAIN W/O DYE: CPT | Mod: MC

## 2025-04-25 PROCEDURE — 80053 COMPREHEN METABOLIC PANEL: CPT

## 2025-04-25 PROCEDURE — 99285 EMERGENCY DEPT VISIT HI MDM: CPT

## 2025-04-25 PROCEDURE — 36000 PLACE NEEDLE IN VEIN: CPT

## 2025-04-25 PROCEDURE — 93005 ELECTROCARDIOGRAM TRACING: CPT

## 2025-04-25 RX ORDER — AMOXICILLIN AND CLAVULANATE POTASSIUM 500; 125 MG/1; MG/1
1 TABLET, FILM COATED ORAL ONCE
Refills: 0 | Status: COMPLETED | OUTPATIENT
Start: 2025-04-25 | End: 2025-04-25

## 2025-04-25 RX ORDER — ALPRAZOLAM 0.5 MG
0.5 TABLET, EXTENDED RELEASE 24 HR ORAL ONCE
Refills: 0 | Status: DISCONTINUED | OUTPATIENT
Start: 2025-04-25 | End: 2025-04-25

## 2025-04-25 RX ORDER — AMOXICILLIN AND CLAVULANATE POTASSIUM 500; 125 MG/1; MG/1
1 TABLET, FILM COATED ORAL
Qty: 10 | Refills: 0
Start: 2025-04-25 | End: 2025-04-29

## 2025-04-25 RX ADMIN — AMOXICILLIN AND CLAVULANATE POTASSIUM 1 TABLET(S): 500; 125 TABLET, FILM COATED ORAL at 01:37

## 2025-04-25 RX ADMIN — Medication 1000 MILLILITER(S): at 01:37

## 2025-04-25 NOTE — ED BEHAVIORAL HEALTH ASSESSMENT NOTE - DESCRIPTION
In the ER pt calm and in behavioral control. asthma, DM, BPH, urinary retention, hx testicular cancer s/p orchidectomy, GERD, hypothyroidism, HTN, HLD, myopia see above

## 2025-04-25 NOTE — ED BEHAVIORAL HEALTH ASSESSMENT NOTE - SUMMARY
Overall pt's current presentation is most c/w his chronic intellectual disability and poor frustration tolerance.  Per group home staff pt is at his psychiatric baseline and this is his typical pattern of behavior.  On exam he is calm, in good behavioral control, is perseverative on wanting to be admitted but can't state how an admission would help him and can't elaborate on symptoms he's experiencing.  Overall psychiatric admission at this time likely to be harmful to patient by reinforcing maladaptive coping strategies and pt is able to be monitored 24/7 at his group home w/o access to dangerous means.  Psychiatrically appropriate for d/c.

## 2025-04-25 NOTE — ED PROVIDER NOTE - OBJECTIVE STATEMENT
34 year old male with PMH of intellectual disability, DM, HLD, resident of a group home, presents to the ER with cc of having had "an agitated episode" when patient attempt to inure self by hitting his head on the wall and biting his Rt hand. No cp, sob or palpitation. No abdominal pain. No neck pain or stiffness. No recent trauma. No recent exotic travel. No visual or focal neurological complaints. Here with residence staff. Currently denies SI or HI.

## 2025-04-25 NOTE — ED ADULT NURSE REASSESSMENT NOTE - NS ED NURSE REASSESS COMMENT FT1
Pt reports history of enlarged prostate, needing foleys. Pt states he is unable to urinate at this time. MD Dumont made aware states have pt use restroom after fluids finish to see if pt can urinate.

## 2025-04-25 NOTE — ED ADULT NURSE NOTE - OBJECTIVE STATEMENT
Pt presents to ed s/p self harm injuries. Pt states "tonight I wanted to hurt myself." Pt reports bitting right hand and hitting for head on the wall. +HI, +SI, +plan states he is going to bite/stab himself. Denies access to knifes. +auditory hallucinations telling him to hurt himself. Bleeding is controlled at this time. Pt appears anxious and cooperative, placed on constant observation w/ staff at bedside at all times. Environment checked for safety. Denies pain/ symptoms at this time. Meds given as ordered. Vital signs stable. Wanded, belongings in security,  20 alia IV established in the right bicep. 1:1 made aware.

## 2025-04-25 NOTE — ED PROVIDER NOTE - PROGRESS NOTE DETAILS
Tim LEMONS: CT normal, labs non actionable. Cleared by psychiatry Dr. Turner. Pt to go back to residence. Provided pt with instructions to return if any health concerns and to follow up with primary care provider. Tim LEMONS: Pt now is making SI statements, states does not want to go back to his residence, wants social work consult. RN to contact psychiatry for repeat assessment. Pt to await psych eval in am. Patient is psychiatrically cleared, no intervention from social work.  Patient is cleared for discharge   To be discharged back to group home with group home staff.

## 2025-04-25 NOTE — ED ADULT NURSE REASSESSMENT NOTE - NS ED NURSE REASSESS COMMENT FT1
Received report from previous RN. Patient is lying on stretcher in semi-fowlers position. No signs of distress noted, Vital signs stable. Patient has no complaints at this time. Pt on continuos 1:1, bed in lowest position, safety and comfort maintained. Pt is calm and cooperative at this time.  Updated on POC

## 2025-04-25 NOTE — ED BEHAVIORAL HEALTH ASSESSMENT NOTE - RISK ASSESSMENT
Patient is at high chronically elevated risk fo harm to self/others given h/o NSSI, h/o aggression, impulsivity.  Acute risk mitigated by good behavioral control in the ER, referral back to outpt provider, and supervised setting of group home.  Psychiatrically appropriate for d/c.

## 2025-04-25 NOTE — ED ADULT NURSE NOTE - IS THE PATIENT ABLE TO BE SCREENED?
Spoke to patient. No questions or concerns present.  ------------------------------------------------------------------------------  ----- Message from Jeff Narayanan MD sent at 6/21/2023  5:19 PM CDT -----  Ultrasound is normal  
Yes

## 2025-04-25 NOTE — ED PROVIDER NOTE - SKIN, MLM
Skin normal color for race, warm, dry and intact. abrasion/contusion of scalp. No laceration. Abrasion of Rt arm.

## 2025-04-25 NOTE — ED ADULT NURSE NOTE - AGENT'S NAME
ED Provider Note    CHIEF COMPLAINT  Chief Complaint   Patient presents with    Abdominal Pain     RUQ abd pain x today.  C/o nausea.  Sent by  for eval       EXTERNAL RECORDS REVIEWED  Reviewed urgent care note from earlier today, on exam she had tenderness to the right upper quadrant, urine pregnancy test was negative, urinalysis showed trace leukocyte esterase but no other evidence of urinary tract infection    HPI/ROS  LIMITATION TO HISTORY   None  OUTSIDE HISTORIAN(S):  None    Sandy Yang is a 30 y.o. female presenting to the emergency department for 2 days of right upper quadrant abdominal discomfort and nausea.  Says that symptoms started about 48 hours ago, describes some postprandial nausea but pain started spontaneously.  No associated fevers chills.  No dysuria or flank pain.  No right lower quadrant  pain patient was seen in urgent care earlier today sent to the emergency department for an ultrasound.  Patient still has her gallbladder, she is status post  section, last was about 7 years ago.  No other abdominal surgeries.    PAST MEDICAL HISTORY   has a past medical history of Cancer (HCC), Panic anxiety syndrome (2013), and PUD (peptic ulcer disease).    SURGICAL HISTORY   has a past surgical history that includes gastroscopy (2009); primary c section (12); breast biopsy (2014); and repeat c section (2017).    FAMILY HISTORY  Family History   Problem Relation Age of Onset    Cancer Paternal Grandmother         lung cancer    Heart Disease Maternal Uncle         MI age 45    Cancer Father         skin    Diabetes Maternal Grandfather     Heart Disease Maternal Grandfather        SOCIAL HISTORY  Social History     Tobacco Use    Smoking status: Former     Current packs/day: 0.50     Average packs/day: 0.5 packs/day for 4.0 years (2.0 ttl pk-yrs)     Types: Cigarettes    Smokeless tobacco: Never    Tobacco comments:     6 cig/day   Vaping Use    Vaping  "status: Never Used   Substance and Sexual Activity    Alcohol use: Not Currently     Comment: occ    Drug use: Not Currently     Types: Marijuana    Sexual activity: Yes     Partners: Male     Birth control/protection: None       CURRENT MEDICATIONS  Home Medications    **Home medications have not yet been reviewed for this encounter**         ALLERGIES  Allergies   Allergen Reactions    Lidocaine     Novocain      Used during dental surgery; had no affect toward deadening her pain    Rocephin [Ceftriaxone] Rash     Itching         PHYSICAL EXAM  VITAL SIGNS: /82   Pulse 92   Temp 36 °C (96.8 °F) (Temporal)   Resp 16   Ht 1.651 m (5' 5\")   Wt 81.7 kg (180 lb 1.9 oz)   LMP  (Within Weeks)   SpO2 90%   BMI 29.97 kg/m²    General: Uncomfortable appearing, tearful non-toxic, no acute distress  Neuro: oriented x 3, moving all extremities.   HEENT:   - Head: Normocephalic, atraumatic  - Eyes: PERRL  - Ears/Nose: normal external nose and ears  - Mouth: moist mucosal membranes  Resp: clear to auscultation, no increased work of breathing  CV: Regular rate and rhythm  Abd: Soft, mild tenderness right upper quadrant.  Positive Day sign.  No rigidity or guarding.  No right lower quadrant left lower quadrant tenderness to palpation.  : No CVA tenderness to percussion.  Extremities: No peripheral edema  Psych: lucid and conversational         DIAGNOSTIC STUDIES / PROCEDURES    LABS and ECG  Results for orders placed or performed during the hospital encounter of 04/24/25   CBC WITH DIFFERENTIAL    Collection Time: 04/24/25  6:46 PM   Result Value Ref Range    WBC 10.6 4.8 - 10.8 K/uL    RBC 4.36 4.20 - 5.40 M/uL    Hemoglobin 14.4 12.0 - 16.0 g/dL    Hematocrit 44.4 37.0 - 47.0 %    .8 (H) 81.4 - 97.8 fL    MCH 33.0 27.0 - 33.0 pg    MCHC 32.4 32.2 - 35.5 g/dL    RDW 44.8 35.9 - 50.0 fL    Platelet Count 311 164 - 446 K/uL    MPV 10.0 9.0 - 12.9 fL    Neutrophils-Polys 60.20 44.00 - 72.00 %    Lymphocytes " 28.60 22.00 - 41.00 %    Monocytes 6.90 0.00 - 13.40 %    Eosinophils 3.60 0.00 - 6.90 %    Basophils 0.40 0.00 - 1.80 %    Immature Granulocytes 0.30 0.00 - 0.90 %    Nucleated RBC 0.00 0.00 - 0.20 /100 WBC    Neutrophils (Absolute) 6.35 1.82 - 7.42 K/uL    Lymphs (Absolute) 3.02 1.00 - 4.80 K/uL    Monos (Absolute) 0.73 0.00 - 0.85 K/uL    Eos (Absolute) 0.38 0.00 - 0.51 K/uL    Baso (Absolute) 0.04 0.00 - 0.12 K/uL    Immature Granulocytes (abs) 0.03 0.00 - 0.11 K/uL    NRBC (Absolute) 0.00 K/uL   COMP METABOLIC PANEL    Collection Time: 04/24/25  6:46 PM   Result Value Ref Range    Sodium 136 135 - 145 mmol/L    Potassium 3.9 3.6 - 5.5 mmol/L    Chloride 102 96 - 112 mmol/L    Co2 20 20 - 33 mmol/L    Anion Gap 14.0 7.0 - 16.0    Glucose 116 (H) 65 - 99 mg/dL    Bun 12 8 - 22 mg/dL    Creatinine 0.93 0.50 - 1.40 mg/dL    Calcium 9.0 8.5 - 10.5 mg/dL    Correct Calcium 8.8 8.5 - 10.5 mg/dL    AST(SGOT) 22 12 - 45 U/L    ALT(SGPT) 12 2 - 50 U/L    Alkaline Phosphatase 77 30 - 99 U/L    Total Bilirubin <0.2 0.1 - 1.5 mg/dL    Albumin 4.2 3.2 - 4.9 g/dL    Total Protein 7.4 6.0 - 8.2 g/dL    Globulin 3.2 1.9 - 3.5 g/dL    A-G Ratio 1.3 g/dL   LIPASE    Collection Time: 04/24/25  6:46 PM   Result Value Ref Range    Lipase 21 11 - 82 U/L   HCG QUAL SERUM    Collection Time: 04/24/25  6:46 PM   Result Value Ref Range    Beta-Hcg Qualitative Serum Negative Negative   ESTIMATED GFR    Collection Time: 04/24/25  6:46 PM   Result Value Ref Range    GFR (CKD-EPI) 84 >60 mL/min/1.73 m 2     *Note: Due to a large number of results and/or encounters for the requested time period, some results have not been displayed. A complete set of results can be found in Results Review.       RADIOLOGY  I have independently interpreted the diagnostic imaging associated with this visit and am waiting the final reading from the radiologist.   My preliminary interpretation is as follows:   - Ultrasound right upper quadrant showed no stones,  no gallbladder wall thickening or pericholecystic fluid  Radiologist interpretation:   US-RUQ   Final Result      1.  No acute sonographic abnormality. No gallstones.   2.  Multiple gallbladder polyps measuring up to 3 mm, too small to follow. No gallstones.              MEDICAL DECISION MAKING      ED COURSE AND PLAN    30-year-old female presenting to the emergency department for 48 hours of right upper quadrant pain and nausea.  Sent from urgent care to get an ultrasound.  On arrival her vital signs are stable she is afebrile not tachycardic.  She does have tenderness palpation of the right upper quadrant but she is not toxic she does not have a peritoneal abdomen.  I ordered basic labs, ultrasound right upper quadrant.  Urinalysis performed at urgent care was negative.  Her pregnancy test obtained at urgent care was negative.    Labs returned reveals no leukocytosis, chemistry without LFT abnormality, normal T. bili.  Lipase is normal.  hCG negative.  Ultrasound right upper quadrant showed a normal gallbladder no stones no gallbladder wall thickening no pericholecystic fluid.  After treatment with Bentyl, Carafate, Pepcid, Zofran, patient was feeling much better.  Her pain was resolved.  She did get a dose of Reglan because she had recurrent nausea.    She is tolerating p.o. at this time, her presentation might be consistent with gastritis or GERD.  I prescribed Carafate which she has been prescribed in the past, she is already on omeprazole.  I also prescribe Zofran to help with nausea.  She is appropriate for discharge home.  She is comfortable with the above plan.               Procedures:      ----------------------------------------------------------------------------------  DISCUSSIONS    I have discussed management of the patient with the following physicians and MARGOTH's:      Discussion of management with other QHP or appropriate source(s):     Escalation of care considered, and ultimately not performed:  Considered CT abdomen pelvis     Barriers to care at this time, including but not limited to:     Decision tools and prescription drugs considered including, but not limited to: Prescribed Carafate, Zofran    FINAL IMPRESSION    1. RUQ pain    2. Nausea        Discharge Medication List as of 4/24/2025 10:26 PM        START taking these medications    Details   sucralfate (CARAFATE) 1 GM Tab Take 1 Tablet by mouth 4 Times a Day,Before Meals and at Bedtime., Disp-120 Tablet, R-3, Normal      ondansetron (ZOFRAN ODT) 4 MG TABLET DISPERSIBLE Take 1 Tablet by mouth every 6 hours as needed for Nausea/Vomiting., Disp-10 Tablet, R-0, Normal             No follow-up provider specified.      DISPOSITION    Discharge home, Stable        This chart was dictated using an electronic voice recognition software. The chart has been reviewed and edited but there is still possibility for dictation errors due to limitation of software.    Martin Estrada, DO 4/24/2025      Liz Wakefield

## 2025-04-25 NOTE — ED BEHAVIORAL HEALTH ASSESSMENT NOTE - DETAILS
pt reports CAH to kill himself though doesn't appear internally preoccupied aggression towards staff h/o bullying unable to complete given pt's cognitive limitations see above yes zyprexa - rash

## 2025-04-25 NOTE — ED PROVIDER NOTE - NSFOLLOWUPINSTRUCTIONS_ED_ALL_ED_FT
Please note that I have provided you with the results of your labs and imaging including a discussion of all incidental findings. You were evaluated in the emergency room by a psychiatrist who cleared you to go back to the residence. Please take antibiotics provided here for you for the bite on your hand to ensure no infection. Please RETURN TO US IMMEDIATELY IF ANY SIGNS OF INFECTION IN THE RIGHT HAND, including if any redness, pain, or if any fever.     Please take your antibiotics as prescribed. Consult your pharmacist about use and dosing. If any other health concerns return to us immediately.    If any depression thoughts or thoughts of self injury return to us immediately.

## 2025-04-25 NOTE — ED ADULT NURSE NOTE - CHIEF COMPLAINT QUOTE
34yM AOx4 ambulatory from [facility] , presents to  ED c/o SI/ HI. pt states he heard voices in his head stating he wanted to kill himself. pt reports self injurious behavior, bite jamin to R hand and abrasion to top of head, no active bleeding at this time. pt states he was combative towards staff at group home. 1:1 initiated in triage. pt placed in sight of triage for safety.

## 2025-04-25 NOTE — ED BEHAVIORAL HEALTH ASSESSMENT NOTE - HPI (INCLUDE ILLNESS QUALITY, SEVERITY, DURATION, TIMING, CONTEXT, MODIFYING FACTORS, ASSOCIATED SIGNS AND SYMPTOMS)
I spoke w/ night manager.  She states he's been feeling nervous since his bday is coming up.  He's been trying to go to the hospital all day.  She states he was trying to be aggressive towards staff in order to be taken to the staff.  He was pushing staff, punching walls and tried to bite himself.  She reports he has chronic poor baseline.  She also states he has chronic daily suicidal thoughts at baseline.  He has 24/7 1:1 at the group home, she doesn't think he would benefit from admission and thinks admission could be harmful.  He has no access to dangerous means.  She states that he generally takes his medications as prescribed. Pt states earlier today he bit his hand and was banging his head against the wall.  States he was feeling upset.  Pt reports he's "feeling suicidal, hearing voices, I want to go to psych".  He reports he's been having SI for the past month, states his medications aren't working.  Continues to repeat he wants to "go to psych".  Reports poor sleep/appetite.  States "nothing" helps w/ AH.      I spoke w/ night manager.  She states he's been feeling nervous since his bday is coming up.  He's been trying to go to the hospital all day.  She states he was trying to be aggressive towards staff in order to be taken to the staff.  He was pushing staff, punching walls and tried to bite himself.  She reports he has chronic poor baseline.  She also states he has chronic daily suicidal thoughts at baseline.  He has 24/7 1:1 at the group home, she doesn't think he would benefit from admission and thinks admission could be harmful.  He has no access to dangerous means.  She states that he generally takes his medications as prescribed. 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, with PMHx of asthma, DM, BPH, urinary retention, hx testicular cancer s/p orchidectomy, GERD, hypothyroidism, HTN, HLD, myopia, PPHx of autism spectrum disorder, moderate intellectual disability; other diagnoses includes bipolar disorder, impulse control disorder, HAVEN, mood disorder NOS, PTSD, ADHD and factitious disorder, with multiple past inpatient psychiatric admissions (as per [employer] staff, most recently 4/2024), hx of NSSIB via cutting, charted longstanding hx of endorsing SI (situational: mostly stemming from discontent of living environment or situation) as well as hx of aggression toward group home staff BIBS for SI/NSSI.      Of note pt has a longstanding h/o endorsing SI and demanding psych admission w/ frequent ER visits for such.  His presentations are generally attributed to his chronic poor frustration tolerance related to his intellectual disability that wouldn't be modified by an acute psychiatric inpatient admission and he is generally d/c'd.      I spoke w/ night manager at pt's residence.  She states pt's been feeling nervous since his bday is coming up.  He's been trying to go to the hospital all day.  She says he doesn't have access to a phone.  She states he was trying to be aggressive towards staff in order to be taken to the hospital.  He was pushing staff, punching walls and tried to bite himself.  She reports he has chronic poor frustration tolerance at baseline.  She also states he has chronic daily suicidal thoughts at baseline.  He has 24/7 1:1 at the [employer] and doesn't have access to dangerous means.  She doesn't think he would benefit from admission and thinks admission could be harmful by reinforcing maladaptive behaviors.  She states that he generally takes his medications as prescribed and advocates for pt to be d/c'd.      I spoke w/ pt who is rocking back and forth in his stretcher, perseverative on being admitted, which per chart appears at his b/l.  Pt states he came to the hospital earlier today he bit his hand and was banging his head against the wall.  States he was feeling upset.  Pt reports he's "feeling suicidal, hearing voices, I want to go to psych".  He reports he's been having SI for the past month, states his medications aren't working.  Continues to repeat he wants to "go to psych".  Reports poor sleep/appetite.  States "nothing" helps w/ AH.  Is unable to elaborate on nature of SI, doesn't specify any particular suicidal plan, is unable to describe any acute stressors, describe any differences from his psychiatric baseline or describe how an admission would benefit him.

## 2025-04-25 NOTE — ED PROVIDER NOTE - PATIENT PORTAL LINK FT
You can access the FollowMyHealth Patient Portal offered by [facility] by registering at the following website: [email or web address]. By joining [employer]’s FollowMyHealth portal, you will also be able to view your health information using other applications (apps) compatible with our system. You can access the FollowMyHealth Patient Portal offered by [facility] by registering at the following website: [email or web address]. By joining [employer]’s FollowMyHealth portal, you will also be able to view your health information using other applications (apps) compatible with our system.

## 2025-04-25 NOTE — ED ADULT TRIAGE NOTE - CHIEF COMPLAINT QUOTE
34yM AOx4 ambulatory from [facility] , presents to  ED c/o SI/ HI. pt states he heard voices in his head stating he wanted to kill himself. pt reports self injurious behavior, bite jamin to R hand and abrasion to top of head, no active bleeding at this time. pt states he was combative towards staff at group home. 1:1 initiated in triage. pt placed in sight of triage for safety.

## 2025-04-25 NOTE — ED ADULT TRIAGE NOTE - HEIGHT IN INCHES
Patient Quality Outreach    Patient is due for the following:   Hypertension -  BP check    Next Steps:   Schedule a nurse only visit for Blood Pressure check    Type of outreach:    Sent [employer] message.      Questions for provider review:    None           Jaswinder Kirk CMA      
[phone]

## 2025-04-25 NOTE — ED ADULT NURSE NOTE - [facility] EDP DISCH TYPE
[facility]NCOUNTER  INTERNAL MEDICINE OFFICE VISIT      CHIEF COMPLAINT:    Brynn Ramirez is a 66 year old female who presents with C/O Medicare Wellness Visit    CHRONIC CONSTIPATION   Had been better with removal of  Calcium and hydrochlorothiazide which she . Had tooth extracted and was on soft diet and feels that this has set her back.  We had additional   Advised metamucil and Senna daily.   No blood in stool. No black stool    Off hydrochlorothiazide her bp has maintained and she has not had weight gain,.     Has hx of bronchiectasis . No steroids . No     Patient Active Problem List   Diagnosis   • VARICOSE VEINS w/ some venous insufficiency   • Irritable bowel syndrome with constipation   • Temporomandibular joint disorders, unspecified   • myofascial pain sydrome vs fibromyalgia   • Nontoxic uninodular goiter; s/p left thyroidectomy/isthmusectomy   • Dysplastic Nevi   • Lentigo [709.09]   • Angioma   • Seborrheic keratosis [702.19]   • Acquired keratoderma   • INTRINSIC ASTHMA, cough variant, 1990's   • GASTROSTOMY STATUS, s/p Nissen Fundoplication 1999   • Special screening for malignant neoplasms, colon   • Osteopenia of multiple sites   • Meningioma (CMS/HCC)   • Stage 3a chronic kidney disease (CMS/HCC)       Past Medical History:   Diagnosis Date   • Closed fracture of lateral malleolus 3/14/2011    Right ankle; right wrist fx with plate insertion   • Cough 1990's    cough/GERD, possible cough variant asthma per  & Dr. Hughse   • Edema    • GASTROSTOMY STATUS, s/p Nissen Fundoplication 3/15/1999    [place]: Dr. Osullivan   • Intrinsic asthma, unspecified 1990's    Dr Cavazos   • Irritable bowel syndrome    • MACROCYTOSIS 2/11/1999   • Myalgia and myositis, unspecified     shoulders   • Nontoxic uninodular goiter 7/17/2002    Dr Lemus   • Osteopenia of multiple sites 8/30/2017   • Other specified temporomandibular joint disorders    • Temporomandibular joint disorders, unspecified  8/12/2001    Dr. Rebolledo.    • VARICOSE VEINS w/ some venous insufficiency 10/18/2000       Family History   Problem Relation Age of Onset   • Heart Father         rheumatic heart disease   • Hyperlipidemia Mother    • Arthritis Mother    • Respiratory Mother         pulmonary bronchiectasis   • High blood pressure Mother         pulmonary htn   • Cancer Mother 90        colon at a later age   • Osteoporosis Mother    • Cancer Brother         bone   • Genitourinary Maternal Grandfather         kidney problems   • Hypertension Maternal Grandmother    • Neurological Disorder Paternal Grandmother         TIA's   • Diabetes Paternal Grandmother         borderline DM   • Sleep Apnea Brother    • Cancer Paternal Aunt         breast cancer & ovarian cancer   • Hypertension Maternal Aunt    • Thyroid Maternal Aunt    • Gastrointestinal Maternal Aunt         gallstones   • Osteoporosis Maternal Aunt    Breast Cancer  : Paternal Cousin and Aunt   Alzheimers: No   Colon cancer or polyps : Mom   Ovarian Cancer: PAS  Bone Cancer : Brother (childhood)     Social Hx  No children. Not . Brother is POA if there is an issue. 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     Tobacco: Lifelong   ETOH : 1-2 per week   EXERCISE : USPTF recs given at 150minutes per week.   SEATBELT : Yes         Current Outpatient Medications   Medication Sig   • ketoconazole (NIZORAL) 2 % cream Apply to affected areas between toes and bottom of feet  twice daily until resolved.   • hydrochlorothiazide (HYDRODIURIL) 25 MG tablet Take 1 tablet by mouth daily.   • fluticasone (FLONASE) 50 MCG/ACT nasal spray Two puffs in each nostril once a day.   • fluticasone (Flovent Diskus) 100 MCG/BLIST inhaler 1p bid to reduce inflammation--as directed   • psyllium (METAMUCIL MULTIHEALTH FIBER) 58.12 % packet for oral suspension    • acetaminophen (TYLENOL) 325 MG tablet Take 2 tablets by mouth every 4 hours as needed for Pain.   • vitamin B-12 (CYANOCOBALAMIN) 1000 MCG tablet Take  1,000 mcg by mouth daily.    • Fexofenadine HCl 180 MG tablet Take 1 tablet by mouth daily. For allergies   • VITAMIN D 1000 UNIT PO CAPS 1 CAPSULE DAILY   • CALCIUM + D CAPS 150-261-330 MG-MG-IU PO 2 CAPSULEs  DAILY   • SUDAFED SINUS  MG PO TABS 2 TABLETS as needed   • MULTIVITAMINS TABS   OR once daily   • VITAMIN C TABS 1000 MG OR 1 TABLET DAILY   • FOLIC ACID TABS 400 MCG OR 1 TABLET DAILY   • albuterol (Ventolin HFA) 108 (90 Base) MCG/ACT inhaler Inhale 2 puffs into the lungs every 4 hours as needed for Shortness of Breath or Wheezing (for shortness of breath).   • hyoscyamine (LEVBID) 0.375 MG 12 hr tablet Take 1 tablet by mouth every 12 hours as needed for Cramping.   • orphenadrine (NORFLEX) 100 MG tablet Take 1 tablet by mouth 2 times daily as needed for Muscle spasms.     No current facility-administered medications for this visit.       ALLERGIES:   Allergen Reactions   • Cefuroxime Other (See Comments)   • Cephalosporins GI UPSET     ceftin   • Doxycycline NAUSEA     nausea         PAST HISTORIES:  I have reviewed the patient's medications and allergies, past medical, surgical, social and family history, updating these as appropriate.  See Histories section of the electronic medical record for a display of this information.      REVIEW OF SYSTEMS:       Review of systems:  Constitutional: No weight changes or significant fatigue. Denies significant night sweats or unusual bruising. No fever or chills      Respiratory: No shortness of breath. Denies wheezing, cough  Cardiovascular: No chest pain or palpitations. Denies pnd or orthopnea  Gastrointestinal: No abdominal pain ,no nausea  , no vomiting, No frequent heartburn, no constipation or diarrhea. No melena or hematochezia. No dysphagia  Skin: No new rashes or changes in  moles.  Extremities: No pain or significant swelling  Neurologic : No focal weakness or numbness , no headaches or dizziness or loss of consciousness .  Musculoskeletal: No  significant joint pain or swelling .   Psychiatry: No symptoms of depression or anxiety, no changes in sleep pattern.      PHYSICAL EXAM:    Vital Signs:    Vitals:    05/04/22 1452   BP: 118/72   Pulse: 98   Resp: 16   Temp: 97.4 °F (36.3 °C)   Weight: 75.8 kg (167 lb)   Height: 5' 2\" (1.575 m)   LMP: 07/04/2004        Wt Readings from Last 5 Encounters:   05/04/22 75.8 kg (167 lb)   03/25/22 75.8 kg (167 lb)   03/10/22 73.5 kg (162 lb)   03/03/22 76 kg (167 lb 9.6 oz)   09/10/21 74.1 kg (163 lb 6.4 oz)     BP Readings from Last 4 Encounters:   05/04/22 118/72   03/25/22 130/74   03/10/22 130/82   03/03/22 136/88         General: Well developed. Well nourished. In no apparent distress.    Eyes: PERRL (pupils equal, round, and reactive to light),  Sclerae anicteric.    Neck: Supple. Nontender. Normal range of motion.  . No crepitus. No thyromegaly. No thyroid tenderness or masses. Trachea midline. Respiratory: Normal respiratory effort. Symmetrical chest expansion. . No chest wall tenderness. Lungs clear to auscultation bilaterally. No wheezes. No rhonchi. No rales. No rubs.  Cardiovascular:  Regular rate and rhythm. No murmurs, rubs, or gallops. Normal S1 and S2. No S3 or S4. No JVD (jugular vein distention). No carotid bruits. No peripheral edema.   Abdominal:  +bowel sounds.  No costovertebral angle tenderness.  No hepatosplenomegaly.  No distention and no tenderness in either the upper or lower abdomen.  There is no rebound or guarding.  No masses or hernias are present.   Neurologic: Alert and oriented x 3. Gait is normal. Integumentary: Warm. Dry. Pink. No rashes or lesions. No suspicious nodules. No wounds.    Lymphatic: No lymphadenopathy in submental, submandibular or cervical chain.   Psychiatric: Cooperative. Appropriate mood and affect. Normal judgment. Recent and remote memory intact.     LAB RESULTS:  Pertinent labs reviewed.      ASSESSMENT:   No diagnosis found.      PLAN:     Chronic constipation    -Suspect that she has underlying IBS constipation variant   -Will keep prior GI ov  -Will continue fiber supplement , fluids and softener  -Miralax as needed   -No warning sx      Hx of meningioma  -Currently with non focal neurologic examination no headaches   -MRI imaging in 3/21 done after 9/21 CT with suggestion of mild growth   1.2 cm x 7.7 mm x 1.4 cm densely calcified meningioma overlying the  posterior right occipital convexity, stable. Stable appearance of an  approximately 9.8 mm probable meningioma along the sulci of bilateral.  -Have ordered MRI of brain with and without contrast for follow up .Any change in size will plan for neurosurgical evaluation     Pancreatic cyst  -She will review with GI   -No jaundice , no lft abnormality and no weight loss  -Will need fu imaging in one year given size              No orders of the defined types were placed in this encounter.      No follow-ups on file.    Pt voiced understanding and agreement with the plan of care.  Provider wore mask,  throughout entire visit.     MEDICARE WELLNESS VISIT NOTE    HISTORY OF PRESENT ILLNESS:   Brynn Ramirez presents for her Subsequent Annual Medicare Wellness Visit.   She has complaints or concerns which include See above .      Patient Care Team:  Keily Hutchinson MD as PCP - General (Internal Medicine)  Andrew Lemus MD (Inactive) as Endocrinologist (Endocrinology)   Dermatologist :Dr Ave villafuerte  GI : Dr Begum  Opht: Dr DREW Keller   Audiology : Dr Rodriguez        Patient Active Problem List   Diagnosis   • VARICOSE VEINS w/ some venous insufficiency   • Irritable bowel syndrome with constipation   • Temporomandibular joint disorders, unspecified   • myofascial pain sydrome vs fibromyalgia   • Nontoxic uninodular goiter; s/p left thyroidectomy/isthmusectomy   • Dysplastic Nevi   • Lentigo [709.09]   • Angioma   • Seborrheic keratosis [702.19]   • Acquired keratoderma   • INTRINSIC ASTHMA, cough variant, 1990's   • GASTROSTOMY  STATUS, s/p Nissen Fundoplication 1999   • Special screening for malignant neoplasms, colon   • Osteopenia of multiple sites   • Meningioma (CMS/HCC)   • Stage 3a chronic kidney disease (CMS/HCC)         Past Medical History:   Diagnosis Date   • Closed fracture of lateral malleolus 3/14/2011    Right ankle; right wrist fx with plate insertion   • Cough 1990's    cough/GERD, possible cough variant asthma per  & Dr. Hughes   • Edema    • GASTROSTOMY STATUS, s/p Nissen Fundoplication 3/15/1999    [facility]: Dr. Osullivan   • Intrinsic asthma, unspecified 1990's    Dr Cavazos   • Irritable bowel syndrome    • MACROCYTOSIS 2/11/1999   • Myalgia and myositis, unspecified     shoulders   • Nontoxic uninodular goiter 7/17/2002    Dr Lemus   • Osteopenia of multiple sites 8/30/2017   • Other specified temporomandibular joint disorders    • Temporomandibular joint disorders, unspecified 8/12/2001    Dr. Rebolledo.    • VARICOSE VEINS w/ some venous insufficiency 10/18/2000         Past Surgical History:   Procedure Laterality Date   • Colonoscopy  11/14/2018   • Colonoscopy diagnostic  7/13/05    normal colon to cecum, poor prep (thou .pt followed full directions)Dr. Corrigan) -- colon due 7/2010   • Colonoscopy diagnostic  8/17/2012    Normal Screen, GOOD Prep, 10 yr recall    • Dexa bone density axial skeleton Left 10/11/2018    lumbar spine T score of 0.5 left him T score of -1.9 Left femoral neck T Score -1.5   • Dexa bone density axial skeleton  10/14/2019    L hip - T score = -2.1, Spine - T score = 0.8   • Esophagogastroduodenoscopy  11/14/2018   • Exc skin benign 0.5cm or less trunk arm leg  7/2/03    dysplastic nevus   • Laparoscopic fundoplasty  03/15/1999    Nissen Fundoplication, laparoscopic; Dr Osullivan   • Mammo screening bilateral Bilateral 10/14/2019    [facility] - Benign   • Open rx dist rad ext artic ulna int fix  3-17-11    ORIF R wrist by Dr. FAHEEM Flowers   • Remove tonsils/adenoids,<10 y/o  1962    • Thyroid lobectomy,unilat Left 11/29/2017    Dr. Webb   • Varicose vein surgery  5/12, 6/12   • Xray mammogram screening bilat  07/08/13    Negative done at [facility]   • Xray mammogram screening bilat  7/9/14    benign         Social History     Tobacco Use   • Smoking status: Never Smoker   • Smokeless tobacco: Never Used   • Tobacco comment: no smokers in home   Vaping Use   • Vaping Use: never used   Substance Use Topics   • Alcohol use: Yes     Alcohol/week: 4.0 standard drinks     Types: 4 Glasses of wine per week     Comment: socially   • Drug use: No     Drug use:    Drug Use:    No              Family History - see above     Current Outpatient Medications   Medication Sig Dispense Refill   • ketoconazole (NIZORAL) 2 % cream Apply to affected areas between toes and bottom of feet  twice daily until resolved. 60 g 11   • hydrochlorothiazide (HYDRODIURIL) 25 MG tablet Take 1 tablet by mouth daily. 90 tablet 3   • fluticasone (FLONASE) 50 MCG/ACT nasal spray Two puffs in each nostril once a day. 1 Bottle 2   • fluticasone (Flovent Diskus) 100 MCG/BLIST inhaler 1p bid to reduce inflammation--as directed 1 each 11   • psyllium (METAMUCIL MULTIHEALTH FIBER) 58.12 % packet for oral suspension      • acetaminophen (TYLENOL) 325 MG tablet Take 2 tablets by mouth every 4 hours as needed for Pain. 30 tablet 0   • vitamin B-12 (CYANOCOBALAMIN) 1000 MCG tablet Take 1,000 mcg by mouth daily.      • Fexofenadine HCl 180 MG tablet Take 1 tablet by mouth daily. For allergies 31 tablet 11   • VITAMIN D 1000 UNIT PO CAPS 1 CAPSULE DAILY 0 0   • CALCIUM + D CAPS 150-261-330 MG-MG-IU PO 2 CAPSULEs  DAILY     • SUDAFED SINUS  MG PO TABS 2 TABLETS as needed     • MULTIVITAMINS TABS   OR once daily 0 0   • VITAMIN C TABS 1000 MG OR 1 TABLET DAILY 0 0   • FOLIC ACID TABS 400 MCG OR 1 TABLET DAILY 0 0   • albuterol (Ventolin HFA) 108 (90 Base) MCG/ACT inhaler Inhale 2 puffs into the lungs every 4 hours as needed for Shortness of  Breath or Wheezing (for shortness of breath). 1 each 3   • hyoscyamine (LEVBID) 0.375 MG 12 hr tablet Take 1 tablet by mouth every 12 hours as needed for Cramping. 60 tablet 11   • orphenadrine (NORFLEX) 100 MG tablet Take 1 tablet by mouth 2 times daily as needed for Muscle spasms. 30 tablet 2     No current facility-administered medications for this visit.        The following items on the Medicare Health Risk Assessment were found to be positive      Vision and Hearing screens:  Sees DR GAYTAN regularly. Has hearing aids . Audiology annually (    Advance Directive:   The patient has the following documents:  Power of  for Health Care    Cognitive/Functional Status: no evidence of cognitive dysfunction by direct observation    Opioid Review: Brynn is not taking opioid medications.    Recent PHQ 2/9 Score:    PHQ 2:  Date Adult PHQ 2 Score Adult PHQ 2 Interpretation   5/4/2022 0 No further screening needed             DEPRESSION ASSESSMENT/PLAN:  Depression screening is negative no further plan needed.     Body mass index is 30.54 kg/m².    BMI ASSESSMENT/PLAN:  Patient is obese.    30 minutes of physical activity a day          See orders.   See Patient Instructions section.   No follow-ups on file.     group home

## 2025-04-25 NOTE — ED BEHAVIORAL HEALTH ASSESSMENT NOTE - CURRENT MEDICATION
list provided by [facility] on 3/13/25, and list reviewed, patient has been compliant with psychiatric medications (Buspirone 15mg qhS, Clonidine 0.3mg BID, Depakote 500mg at 2pm, 1000mg qhS, fluphenazine 75mg IM q4 weeks, remeron 15mg qHS, Remeron 7.5mg qHS, Risperdal 4mg at 2pm and 7pm

## 2025-04-28 NOTE — ED BEHAVIORAL HEALTH ASSESSMENT NOTE - REMOTE MEMORY
4/28/25 Mammogram order faxed to [facility] [phone] confirmation status ok  
Patient would like mammo script sent to [place].   
Normal

## 2025-05-12 NOTE — ED BEHAVIORAL HEALTH ASSESSMENT NOTE - REASON FOR REFERRAL
FEMALE ANNUAL EXAM NOTE        HISTORY        The patient is a 70-year-old woman here for a Medicare wellness visit and annual physical.    She reports no new health issues this year, with her cardiology status remaining stable. Her respiratory function is satisfactory, and she has not experienced any chest pain or leg swelling. 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 Her blood pressure has been well-controlled, although she had a single episode of elevated blood pressure at 180/112 during a rental visit. She has not contracted any colds or influenza this winter. She undergoes annual eye examinations but does not regularly visit the dentist. She reports no gastrointestinal symptoms such as heartburn, nausea, or abdominal pain. She has no issues with bladder control or urinary tract infections. She reports no skin concerns, moles, or rashes. She attempts to use sunscreen when outdoors but occasionally forgets. Her sleep quality varies depending on her daily activities and stress levels. She has reduced her intake of chocolate milk and peanut butter. She uses albuterol as needed, which varies depending on environmental conditions.    She continues atorvastatin for her lipids and is tolerating well.    Her hand condition remains unchanged, with stiffness increasing with inactivity. She manages severe hand pain with ibuprofen.        MEDICAL HISTORY    Past Medical History:   Diagnosis Date    Arthritis     Chronic cough     Essential (primary) hypertension     Hemiparesthesia 1/2014    History of rheumatic fever 1958    Hypertensive emergency, no CHF 1/24/2021    NSTEMI (non-ST elevation myocardial infarction)  (CMD) 01/2021    s/p PTCA/Stent LAD    Stress-induced cardiomyopathy        SURGICAL HISTORY    Past Surgical History:   Procedure Laterality Date    Extracapsular cataract removal w insert io lens prosth wo ecp Bilateral 09/2022    Hysterectomy         MEDICATIONS    Outpatient  Medications Marked as Taking for the 5/12/25 encounter (Office Visit) with Sofia Rodriguez MD   Medication Sig Dispense Refill    ibuprofen (MOTRIN) 200 MG tablet Take 200 mg by mouth every 6 hours as needed for Pain.      atorvastatin (LIPITOR) 40 MG tablet Take 1 tablet by mouth nightly. 90 tablet 3    aspirin 81 MG chewable tablet CHEW AND SWALLOW 1 TABLET BY MOUTH DAILY 90 tablet 3    losartan (COZAAR) 25 MG tablet TAKE 1 TABLET BY MOUTH DAILY 90 tablet 3    metoPROLOL succinate (TOPROL-XL) 25 MG 24 hr tablet TAKE 1 TABLET BY MOUTH DAILY 90 tablet 3    albuterol 108 (90 Base) MCG/ACT inhaler Inhale 2 puffs into the lungs every 4 hours as needed for Shortness of Breath or Wheezing. 8.5 g 3    Krill Oil 1000 MG Cap Take 1,000 mg by mouth daily.       Multiple Vitamins-Minerals (MULTIVITAMIN PO) Take 1 tablet by mouth daily.          ALLERGIES    ALLERGIES:   Allergen Reactions    Contrast Media THROAT SWELLING     Approx 2006; states she had tongue swelling    Lisinopril Other (See Comments)     Per MD note from 2-19-14, patient had tongue swelling and rash from lisinopril.       SOCIAL HISTORY    Social History     Tobacco Use    Smoking status: Never    Smokeless tobacco: Never   Vaping Use    Vaping status: never used   Substance Use Topics    Alcohol use: Yes    Drug use: Never       FAMILY HISTORY    Family History   Problem Relation Age of Onset    Heart disease Mother         rheumatic fever    Lung Disease Father         pulmonary fibrosis       REVIEW OF SYSTEMS    Constitutional:  No loss of appetite.  No abnormal weight gain.  No abnormal weight loss.  No fevers.  No night sweats.  No weakness.  No fatigue.  Eyes:  No blindness.  No blurred vision.  No double vision.  No pain.  No itching.  No burning.  HENT:  Head:  No facial pain.  Ears:  No ear pain.  No hearing loss or difficulty.  No tinnitus.  No discharge.  Nose:  No nasal congestion.  No rhinorrhea.  No epistaxis.  No sinus pain.  Mouth:  No  mouth lesions.  No sore throat.  No difficulty swallowing.  Respiratory:  No SOB.  No cough.  No wheezing.  No sputum production.  No hemoptysis.  Cardiovascular:  No chest pains.  No palpitations.  No orthopnea.  No tachycardia.  No edema.  No cyanosis.  No vertigo.  Gastrointestinal:  No abdominal pain.  No heartburn.  No nausea.  No vomiting.  No diarrhea.  No constipation.  No change in bowel habits.  No hematemesis.  No blood in stool.  Musculoskeletal:  No back pain.  No joint pain.  No joint swelling or tenderness.  No muscle pains or spasms.  No neck pain.  No neck stiffness.  No neck swelling.  Neurologic:  No numbness, no tingling, no other sensory changes, no sudden weakness in arms or legs.  No confusion, no headache, no dizziness, no memory loss or tremors.  Genitourinary:  No dysuria.  No hematuria.  No urinary frequency.  No nocturia.  No urgency.  No incontinence.  Hematologic/Lymph:  No easy bruising or bleeding.  No swollen lymph glands.  Endocrine:  No heat or cold intolerance.  No polydipsia.  No polyuria.  No changes in hair or skin texture.  Integument:  No new rashes or lesions.  No color change.  No pruritus.  No dryness of skin.  Psychiatric:  No anxiety.  No depression.  No hallucinations.  No irritability.  No insomnia.  Gyn:  LMP:  post Periods post.  No vaginal discharge.  No pain with sex.  No breast tenderness.  Last Pap:  n/a  Allergic/Immunologic:  No recurrent infections.  No hypersensitivity.      PHYSICAL EXAM    Vital Signs:    Vitals:    05/12/25 1025   BP: 118/70   BP Location: LUE - Left upper extremity   Patient Position: Sitting   Cuff Size: Regular   Pulse: 64   Resp: 16   SpO2: 94%   Weight: 58.5 kg (129 lb)   Height: 5' 1\" (1.549 m)     General:  Well developed, well nourished.  In no apparent distress.  Eyes:  PERRL, EOMI.  Conjunctivae pink.  Sclerae anicteric.  HENT:  Normocephalic.  Atraumatic.  Bilateral external ears are normal.  TMs within normal limits bilateral.   Lips, teeth, and gums within normal limits.  Mucosal membranes moist.  Nose appears normal.  Oropharynx within normal limits.  Neck:  Supple.  Nontender.  No thyromegaly.  Trachea midline.  No carotid bruits auscultated.  Respiratory:  Normal respiratory effort.  No chest wall tenderness.  Lungs clear to auscultation bilaterally.  Cardiovascular:  Regular rate and rhythm.  No murmurs, rubs or gallops.  Normal S1 and S2.  Good dorsalis pedis pulses bilaterally.  No peripheral edema.  Gastrointestinal:  Soft.  Nontender in all 4 quadrants.  Nondistended.  Normal bowel sounds.  No pulsatile or other abdominal masses.  No hepatosplenomegaly.    Breasts:  declined  :  declined exam  Musculoskeletal:  Full range of motion in all 4 extremities proximal and distal.  Back in normal alignment.  No paraspinal tenderness palpated. Typical changes of osteoarthritis in both hands.  Neurologic:  Gait is normal.  No motor deficits in all 4 extremities.  Symmetrical bilateral biceps, brachioradialis, knee and ankle DTRs.  Integumentary:  Warm.  Dry.  Pink.  No rashes or lesions.  No wounds.  Lymphatic:  No cervical lymphadenopathy.  No supraclavicular or infraclavicular lymphadenopathy.  No axillary or inguinal lymphadenopathy.  Psychiatric:  Cooperative.  Appropriate mood and affect.  Normal judgment.    RESULTS    Pertinent labs and imaging studies reviewed.  Lab Services on 05/05/2025   Component Date Value Ref Range Status    Hemoglobin A1C 05/05/2025 5.8 (H)  4.5 - 5.6 % Final    Fasting Status 05/05/2025 15  0 - 999 Hours Final    Sodium 05/05/2025 144  135 - 145 mmol/L Final    Potassium 05/05/2025 4.8  3.4 - 5.1 mmol/L Final    Chloride 05/05/2025 108  97 - 110 mmol/L Final    Carbon Dioxide 05/05/2025 26  21 - 32 mmol/L Final    Anion Gap 05/05/2025 15  7 - 19 mmol/L Final    Glucose 05/05/2025 99  70 - 99 mg/dL Final    BUN 05/05/2025 26 (H)  6 - 20 mg/dL Final    Creatinine 05/05/2025 0.97 (H)  0.51 - 0.95 mg/dL Final     Glomerular Filtration Rate 05/05/2025 62  >=60 Final    BUN/Cr 05/05/2025 27 (H)  7 - 25 Final    Calcium 05/05/2025 9.2  8.4 - 10.2 mg/dL Final    Bilirubin, Total 05/05/2025 0.6  0.2 - 1.0 mg/dL Final    GOT/AST 05/05/2025 26  <=37 Units/L Final    GPT/ALT 05/05/2025 29  <64 Units/L Final    Alkaline Phosphatase 05/05/2025 82  45 - 117 Units/L Final    Albumin 05/05/2025 3.5  3.4 - 5.0 g/dL Final    Protein, Total 05/05/2025 6.9  6.4 - 8.2 g/dL Final    Globulin 05/05/2025 3.4  2.0 - 4.0 g/dL Final    A/G Ratio 05/05/2025 1.0  1.0 - 2.4 Final    Cholesterol 05/05/2025 124  <=199 mg/dL Final    Triglycerides 05/05/2025 56  <=149 mg/dL Final    HDL 05/05/2025 73  >=50 mg/dL Final    LDL 05/05/2025 40  <=129 mg/dL Final    Non-HDL Cholesterol 05/05/2025 51  mg/dL Final    Cholesterol/ HDL Ratio 05/05/2025 1.7  <=4.4 Final    WBC 05/05/2025 7.1  4.2 - 11.0 K/mcL Final    RBC 05/05/2025 4.31  4.00 - 5.20 mil/mcL Final    HGB 05/05/2025 13.2  12.0 - 15.5 g/dL Final    HCT 05/05/2025 41.6  36.0 - 46.5 % Final    MCV 05/05/2025 96.5  78.0 - 100.0 fl Final    MCH 05/05/2025 30.6  26.0 - 34.0 pg Final    MCHC 05/05/2025 31.7 (L)  32.0 - 36.5 g/dL Final    RDW-CV 05/05/2025 13.3  11.0 - 15.0 % Final    RDW-SD 05/05/2025 47.8  39.0 - 50.0 fL Final    PLT 05/05/2025 270  140 - 450 K/mcL Final    NRBC 05/05/2025 0  <=0 /100 WBC Final    Neutrophil, Percent 05/05/2025 62  % Final    Lymphocytes, Percent 05/05/2025 22  % Final    Mono, Percent 05/05/2025 10  % Final    Eosinophils, Percent 05/05/2025 5  % Final    Basophils, Percent 05/05/2025 1  % Final    Immature Granulocytes 05/05/2025 0  % Final    Absolute Neutrophils 05/05/2025 4.4  1.8 - 7.7 K/mcL Final    Absolute Lymphocytes 05/05/2025 1.6  1.0 - 4.0 K/mcL Final    Absolute Monocytes 05/05/2025 0.7  0.3 - 0.9 K/mcL Final    Absolute Eosinophils  05/05/2025 0.3  0.0 - 0.5 K/mcL Final    Absolute Basophils 05/05/2025 0.1  0.0 - 0.3 K/mcL Final    Absolute Immature  Granulocytes 05/05/2025 0.0  0.0 - 0.2 K/[facility] Final                ASSESSMENT/PLAN:  1. Medicare annual wellness visit, subsequent .  No concerns identified on HRA screen. Continues to declines health maintenance such as vaccines, colon ca screening. Dexa or mammogram   2. Annual physical exam. Complete exam performed    3. Essential hypertension, benign . Controlled on losartan and metoprolol   4. Other hyperlipidemia .  Controlled on atorvvastatin   5. Chronic bronchitis, unspecified chronic bronchitis type  (CMD) ,  stable with prn albuterol   6. Thoracic aortic ectasia (CMD) . Stable, followed by cardiology   7. Stress-induced cardiomyopathy . Compensated, followed by cardiology   8. History of non-ST elevation myocardial infarction (NSTEMI) . Followed by cardiology   9. Elevated fasting glucose . Improved glucose. Continue diet, exercise efforts   10. Primary osteoarthritis of both hands . Stable symptoms.       Orders Placed This Encounter     - Subsequent Medicare Wellness Visit - Select if billing add'l E/M    PREV EST AGE >64    Glycohemoglobin    CBC with Automated Differential    Comprehensive Metabolic Panel    Lipid Panel With Reflex       Patient Instructions     Medicare Wellness Visit  Plan for Preventive Care    A good way for you to stay healthy is to use preventive care.  Medicare covers many services that can help you stay healthy.* The goal of these services is to find any health problems as quickly as possible. Finding problems early can help make them easier to treat.  Your personal plan below lists the services you may need and when they are due.      Health Maintenance Summary       Pneumococcal Vaccine 50+ (2 of 2 - PCV)  Overdue since 10/28/2009    DTaP/Tdap/Td Vaccine (2 - Td or Tdap)  Overdue since 9/24/2019    COVID-19 Vaccine (6 - 2024-25 season)  Overdue since 9/1/2024    Depression Screening (Yearly)  Due since 5/7/2025    Breast Cancer Screening (Every 2 Years)  Never done     Colorectal Cancer Screening  Never done    Shingles Vaccine (1 of 2)  Never done    Hepatitis C Screening (Once)  Never done    Respiratory Syncytial Virus (RSV) Vaccine 60+ (1 - Risk 60-74 years 1-dose series)  Never done    Osteoporosis Screening (Once)  Never done    Medicare Advantage- Medicare Wellness Visit (Yearly - January to December)  Scheduled for 5/12/2025    Influenza Vaccine (Season Ended)  Next due on 9/1/2025    Hepatitis A Vaccine   Aged Out    Meningococcal Vaccine   Aged Out    Hepatitis B Vaccine (For Physician/APC Discussion)   Aged Out    Meningococcal Serogroup B Vaccine   Aged Out    HPV Vaccine   Aged Out             Preventive Care for Women and Men    Heart Screenings (Cardiovascular):  Blood tests are used to check your cholesterol, lipid and triglyceride levels. High levels can increase your risk for heart disease and stroke. High levels can be treated with medications, diet and exercise. Lowering your levels can help keep your heart and blood vessels healthy.  Your provider will order these tests if they are needed.    An ultrasound is done to see if you have an abdominal aortic aneurysm (AAA).  This is an enlargement of one of the main blood vessels that delivers blood to the body.   In the [place], 9,000 deaths are caused by AAA.  You may not even know you have this problem and as many as 1 in 3 people will have a serious problem if it is not treated.  Early diagnosis allows for more effective treatment and cure.  If you have a family history of AAA or are a male age 65-75 who has smoked, you are at higher risk of an AAA.  Your provider can order this test, if needed.    Colorectal Screening:  There are many tests that are used to check for cancer of your colon and rectum. You and your provider should discuss what test is best for you and when to have it done.  Options include:  Screening Colonoscopy: exam of the entire colon, seen through a flexible lighted tube.  Flexible  Sigmoidoscopy: exam of the last third (sigmoid portion) of the colon and rectum, seen through a flexible lighted tube.  Cologuard DNA stool test: a sample of your stool is used to screen for cancer and unseen blood in your stool.  Fecal Occult Blood Test: a sample of your stool is studied to find any unseen blood    Flu Shot:  An immunization that helps to prevent influenza (the flu). You should get this every year. The best time to get the shot is in the fall.    Pneumococcal Shot:  Vaccines help prevent pneumococcal disease, which is any type of illness caused by Streptococcus pneumoniae bacteria. There are two kinds of pneumococcal vaccines available in the [place]:   Pneumococcal conjugate vaccines (PCV20 or Lnydvyb36®)  Pneumococcal polysaccharide vaccine (PPSV23 or Oxhypqudc45®)  For those who have never received any pneumococcal conjugate vaccine, CDC recommends PVC20 for adults 65 years or older and adults 19 through 64 years old with certain medical conditions or risk factors.   For those who have previously received PCV13, this should be followed by a dose of PPSV23.     Hepatitis B Shot:  An immunization that helps to protect people from getting Hepatitis B. Hepatitis B is a virus that spreads through contact with infected blood or body fluids. Many people with the virus do not have symptoms.  The virus can lead to serious problems, such as liver disease. Some people are at higher risk than others. Your doctor will tell you if you need this shot.     Diabetes Screening:  A test to measure sugar (glucose) in your blood is called a fasting blood sugar. Fasting means you cannot have food or drink for at least 8 hours before the test. This test can detect diabetes long before you may notice symptoms.    Glaucoma Screening:  Glaucoma screening is performed by your eye doctor. The test measures the fluid pressure inside your eyes to determine if you have glaucoma.     Hepatitis C Screening:  A blood test  to see if you have the hepatitis C virus.  Hepatitis C attacks the liver and is a major cause of chronic liver disease.  Medicare will cover a single screening for all adults born between 1945 & 1965, or high risk patients (people who have injected illegal drugs or people who have had blood transfusions).  High risk patients who continue to inject illegal drugs can be screened for Hepatitis C every year.    Smoking and Tobacco-Use Cessation Counseling:  Tobacco is the single greatest cause of disease and early death in our country today. Medication and counseling together can increase a person’s chance of quitting for good.   Medicare covers two quitting attempts per year, with four counseling sessions per attempt (eight sessions in a 12 month period)    Preventive Screening tests for Women    Screening Mammograms and Breast Exams:  An x-ray of your breasts to check for breast cancer before you or your doctor may be able to feel it.  If breast cancer is found early it can usually be treated with success.    Pelvic Exams and Pap Tests:  An exam to check for cervical and vaginal cancer. A Pap test is a lab test in which cells are taken from your cervix and sent to the lab to look for signs of cervical cancer. If cancer of the cervix is found early, chances for a cure are good. Testing can generally end at age 65, or if a woman has a hysterectomy for a benign condition. Your provider may recommend more frequent testing if certain abnormal results are found.    Bone Mass Measurements:  A painless x-ray of your bone density to see if you are at risk for a broken bone. Bone density refers to the thickness of bones or how tightly the bone tissue is packed.    Preventive Screening tests for Men    Prostate Screening:  Should you have a prostate cancer test (PSA)?  It is up to you to decide if you want a prostate cancer test. Talk to your clinician to find out if the test is right for you.  Things for you to consider and  talk about should include:  Benefits and harms of the test  Your family history  How your race/ethnicity may influence the test  If the test may impact other medical conditions you have  Your values on screenings and treatments    *Medicare pays for many preventive services to keep you healthy. For some of these services, you might have to pay a deductible, coinsurance, and / or copayment.  The amounts vary depending on the type of services you need and the kind of Medicare health plan you have.    For further details on screenings offered by Medicare please visit: https://www.medicare.gov/coverage/preventive-screening-services                                     SI

## 2025-05-15 NOTE — ED PROVIDER NOTE - PATIENT PORTAL LINK FT
May 15, 2025     KATRIN [facility]  [address]  [facility] [address]    Patient: Luz Pardo   YOB: 1954   Date of Visit: 5/15/2025       Dear KATRIN Leon:    Thank you for referring Luz Pardo to me for evaluation. Below are my notes for this consultation.    If you have questions, please do not hesitate to call me. I look forward to following your patient along with you.         Sincerely,        Cinda Galdamez MD        CC: No Recipients  Cinda Galdamez MD  5/15/2025  2:41 PM  Sign when Signing Visit  [facility] - Hemorrhoid Banding Luz Pardo 70 y.o. female MRN: [id number]  Encounter: [id number]    ASSESSMENT AND PLAN:    The right anterior hemorrhoid area was banded today. The patient was instructed to avoid constipation and straining, and educated in appropriate fiber intake.     HPI: Luz Pardo is a 70 y.o. year old female who presents with rectal bleeding due to hemorrhoids.      Previous treatments include: Banding, OTC  Bands were previously placed: 2022: LL, RA. 2025: LL, RA  Current Fiber intake: Dietary  Complications of prior therapy include: diarrhea causing band to fall off within 24hrs.     The patient provided consent for banding  and was placed in the left lateral position  Rectal exam showed grade 3 hemorrhoids  The [place] ligator was advanced and a band was applied without difficulty   Repeat rectal exam confirmed appropriate placement  The patient was discharged home after observation.    You can access the FollowMyHealth Patient Portal offered by [facility] by registering at the following website: [email or web address]. By joining [facility]’s FollowMyHealth portal, you will also be able to view your health information using other applications (apps) compatible with our system.

## 2025-05-19 ENCOUNTER — APPOINTMENT (OUTPATIENT)
Dept: OTOLARYNGOLOGY | Facility: CLINIC | Age: 35
End: 2025-05-19
Payer: MEDICAID

## 2025-05-19 VITALS — BODY MASS INDEX: 40.23 KG/M2 | HEIGHT: 70 IN | WEIGHT: 281 LBS

## 2025-05-19 DIAGNOSIS — G47.33 OBSTRUCTIVE SLEEP APNEA (ADULT) (PEDIATRIC): ICD-10-CM

## 2025-05-19 DIAGNOSIS — H90.3 SENSORINEURAL HEARING LOSS, BILATERAL: ICD-10-CM

## 2025-05-19 DIAGNOSIS — H93.293 OTHER ABNORMAL AUDITORY PERCEPTIONS, BILATERAL: ICD-10-CM

## 2025-05-19 PROCEDURE — 99214 OFFICE O/P EST MOD 30 MIN: CPT

## 2025-05-23 ENCOUNTER — APPOINTMENT (OUTPATIENT)
Dept: UROLOGY | Facility: CLINIC | Age: 35
End: 2025-05-23

## 2025-05-23 ENCOUNTER — NON-APPOINTMENT (OUTPATIENT)
Age: 35
End: 2025-05-23

## 2025-05-23 VITALS
SYSTOLIC BLOOD PRESSURE: 107 MMHG | BODY MASS INDEX: 36.26 KG/M2 | HEIGHT: 71 IN | DIASTOLIC BLOOD PRESSURE: 76 MMHG | OXYGEN SATURATION: 99 % | HEART RATE: 77 BPM | RESPIRATION RATE: 16 BRPM | WEIGHT: 259 LBS

## 2025-05-23 DIAGNOSIS — R32 UNSPECIFIED URINARY INCONTINENCE: ICD-10-CM

## 2025-05-23 DIAGNOSIS — R33.9 RETENTION OF URINE, UNSPECIFIED: ICD-10-CM

## 2025-05-23 PROCEDURE — 99203 OFFICE O/P NEW LOW 30 MIN: CPT

## 2025-06-02 NOTE — ED ADULT TRIAGE NOTE - MODE OF ARRIVAL
CALLED AND SPOKE TO PT AND HE STATED THAT HE WILL BE OUT OF TOWN FOR ANOTHER MONTH OR SO AND THAT HE WILL CALL US BACK TO [facility] ONCE HE IS BACK IN TOWN.   Ambulance EMS

## 2025-06-08 NOTE — DIETITIAN INITIAL EVALUATION ADULT - NSICDXPASTSURGICALHX_GEN_ALL_CORE_FT
08-Jun-2025 10:36 08-Jun-2025 13:24 08-Jun-2025 13:24 08-Jun-2025 03:20 PAST SURGICAL HISTORY:  No significant past surgical history     No significant past surgical history

## 2025-06-10 ENCOUNTER — APPOINTMENT (OUTPATIENT)
Dept: PULMONOLOGY | Facility: CLINIC | Age: 35
End: 2025-06-10

## 2025-06-19 ENCOUNTER — INPATIENT (INPATIENT)
Facility: HOSPITAL | Age: 35
LOS: 3 days | Discharge: NURSING, REHAB OR LONG-TERM CARE | DRG: 696 | End: 2025-06-23
Attending: FAMILY MEDICINE | Admitting: INTERNAL MEDICINE
Payer: MEDICAID

## 2025-06-19 ENCOUNTER — EMERGENCY (EMERGENCY)
Facility: HOSPITAL | Age: 35
LOS: 1 days | End: 2025-06-19
Attending: EMERGENCY MEDICINE | Admitting: EMERGENCY MEDICINE
Payer: MEDICAID

## 2025-06-19 VITALS
RESPIRATION RATE: 18 BRPM | OXYGEN SATURATION: 94 % | HEART RATE: 93 BPM | SYSTOLIC BLOOD PRESSURE: 128 MMHG | TEMPERATURE: 98 F | HEIGHT: 71 IN | DIASTOLIC BLOOD PRESSURE: 94 MMHG | WEIGHT: 255.07 LBS

## 2025-06-19 VITALS
DIASTOLIC BLOOD PRESSURE: 97 MMHG | RESPIRATION RATE: 18 BRPM | HEART RATE: 99 BPM | TEMPERATURE: 98 F | OXYGEN SATURATION: 99 % | SYSTOLIC BLOOD PRESSURE: 150 MMHG

## 2025-06-19 VITALS
HEIGHT: 71 IN | WEIGHT: 255.07 LBS | RESPIRATION RATE: 18 BRPM | SYSTOLIC BLOOD PRESSURE: 137 MMHG | TEMPERATURE: 98 F | HEART RATE: 112 BPM | DIASTOLIC BLOOD PRESSURE: 95 MMHG | OXYGEN SATURATION: 96 %

## 2025-06-19 DIAGNOSIS — R33.9 RETENTION OF URINE, UNSPECIFIED: ICD-10-CM

## 2025-06-19 DIAGNOSIS — Z90.79 ACQUIRED ABSENCE OF OTHER GENITAL ORGAN(S): Chronic | ICD-10-CM

## 2025-06-19 LAB
APPEARANCE UR: CLEAR — SIGNIFICANT CHANGE UP
BILIRUB UR-MCNC: NEGATIVE — SIGNIFICANT CHANGE UP
COLOR SPEC: YELLOW — SIGNIFICANT CHANGE UP
DIFF PNL FLD: NEGATIVE — SIGNIFICANT CHANGE UP
GLUCOSE UR QL: NEGATIVE MG/DL — SIGNIFICANT CHANGE UP
KETONES UR QL: NEGATIVE MG/DL — SIGNIFICANT CHANGE UP
LEUKOCYTE ESTERASE UR-ACNC: NEGATIVE — SIGNIFICANT CHANGE UP
NITRITE UR-MCNC: NEGATIVE — SIGNIFICANT CHANGE UP
PH UR: 6 — SIGNIFICANT CHANGE UP (ref 5–8)
PROT UR-MCNC: NEGATIVE MG/DL — SIGNIFICANT CHANGE UP
SP GR SPEC: 1.01 — SIGNIFICANT CHANGE UP (ref 1–1.03)
UROBILINOGEN FLD QL: 0.2 MG/DL — SIGNIFICANT CHANGE UP (ref 0.2–1)

## 2025-06-19 PROCEDURE — 99285 EMERGENCY DEPT VISIT HI MDM: CPT

## 2025-06-19 PROCEDURE — 99222 1ST HOSP IP/OBS MODERATE 55: CPT

## 2025-06-19 PROCEDURE — 99283 EMERGENCY DEPT VISIT LOW MDM: CPT

## 2025-06-19 RX ORDER — LIDOCAINE HCL/PF 20 MG/ML
5 AMPUL, LUER TIP INJECTION EVERY 6 HOURS
Refills: 0 | Status: DISCONTINUED | OUTPATIENT
Start: 2025-06-19 | End: 2025-06-23

## 2025-06-19 RX ORDER — LEVOTHYROXINE SODIUM 300 MCG
75 TABLET ORAL DAILY
Refills: 0 | Status: DISCONTINUED | OUTPATIENT
Start: 2025-06-20 | End: 2025-06-23

## 2025-06-19 RX ORDER — FERROUS SULFATE 137(45) MG
325 TABLET, EXTENDED RELEASE ORAL AT BEDTIME
Refills: 0 | Status: DISCONTINUED | OUTPATIENT
Start: 2025-06-20 | End: 2025-06-23

## 2025-06-19 RX ORDER — RISPERIDONE 4 MG
4 TABLET ORAL EVERY 12 HOURS
Refills: 0 | Status: DISCONTINUED | OUTPATIENT
Start: 2025-06-19 | End: 2025-06-23

## 2025-06-19 RX ORDER — LIDOCAINE HYDROCHLORIDE 20 MG/ML
1 JELLY TOPICAL EVERY 6 HOURS
Refills: 0 | Status: DISCONTINUED | OUTPATIENT
Start: 2025-06-19 | End: 2025-06-19

## 2025-06-19 RX ORDER — METFORMIN HYDROCHLORIDE 500 MG/1
1000 TABLET ORAL
Refills: 0 | Status: DISCONTINUED | OUTPATIENT
Start: 2025-06-19 | End: 2025-06-23

## 2025-06-19 RX ORDER — TESTOSTERONE 5.5 MG/1
100 GEL NASAL DAILY
Refills: 0 | Status: DISCONTINUED | OUTPATIENT
Start: 2025-06-20 | End: 2025-06-23

## 2025-06-19 RX ORDER — TAMSULOSIN HYDROCHLORIDE 0.4 MG/1
0.4 CAPSULE ORAL AT BEDTIME
Refills: 0 | Status: DISCONTINUED | OUTPATIENT
Start: 2025-06-19 | End: 2025-06-20

## 2025-06-19 RX ORDER — MAGNESIUM, ALUMINUM HYDROXIDE 200-200 MG
30 TABLET,CHEWABLE ORAL EVERY 4 HOURS
Refills: 0 | Status: DISCONTINUED | OUTPATIENT
Start: 2025-06-19 | End: 2025-06-23

## 2025-06-19 RX ORDER — SENNA 187 MG
2 TABLET ORAL AT BEDTIME
Refills: 0 | Status: DISCONTINUED | OUTPATIENT
Start: 2025-06-19 | End: 2025-06-23

## 2025-06-19 RX ORDER — ATORVASTATIN CALCIUM 80 MG/1
10 TABLET, FILM COATED ORAL AT BEDTIME
Refills: 0 | Status: DISCONTINUED | OUTPATIENT
Start: 2025-06-19 | End: 2025-06-23

## 2025-06-19 RX ORDER — LITHIUM CARBONATE 600 MG/1
300 CAPSULE, GELATIN COATED ORAL
Refills: 0 | DISCHARGE

## 2025-06-19 RX ORDER — TIRZEPATIDE 7.5 MG/.5ML
5 INJECTION, SOLUTION SUBCUTANEOUS
Refills: 0 | DISCHARGE

## 2025-06-19 RX ORDER — ACETAMINOPHEN 500 MG/5ML
650 LIQUID (ML) ORAL EVERY 6 HOURS
Refills: 0 | Status: DISCONTINUED | OUTPATIENT
Start: 2025-06-19 | End: 2025-06-23

## 2025-06-19 RX ORDER — LITHIUM CARBONATE 600 MG/1
300 CAPSULE, GELATIN COATED ORAL AT BEDTIME
Refills: 0 | Status: DISCONTINUED | OUTPATIENT
Start: 2025-06-19 | End: 2025-06-23

## 2025-06-19 RX ORDER — ONDANSETRON HCL/PF 4 MG/2 ML
4 VIAL (ML) INJECTION EVERY 8 HOURS
Refills: 0 | Status: DISCONTINUED | OUTPATIENT
Start: 2025-06-19 | End: 2025-06-23

## 2025-06-19 RX ORDER — MELATONIN 5 MG
3 TABLET ORAL AT BEDTIME
Refills: 0 | Status: DISCONTINUED | OUTPATIENT
Start: 2025-06-19 | End: 2025-06-23

## 2025-06-19 RX ADMIN — LITHIUM CARBONATE 300 MILLIGRAM(S): 600 CAPSULE, GELATIN COATED ORAL at 21:05

## 2025-06-19 RX ADMIN — TAMSULOSIN HYDROCHLORIDE 0.4 MILLIGRAM(S): 0.4 CAPSULE ORAL at 21:04

## 2025-06-19 RX ADMIN — Medication 2 TABLET(S): at 21:04

## 2025-06-19 RX ADMIN — ATORVASTATIN CALCIUM 10 MILLIGRAM(S): 80 TABLET, FILM COATED ORAL at 21:04

## 2025-06-19 RX ADMIN — Medication 1000 MILLIGRAM(S): at 22:22

## 2025-06-19 RX ADMIN — Medication 4 MILLIGRAM(S): at 21:04

## 2025-06-19 NOTE — H&P ADULT - ASSESSMENT
(Patient DID NOT present with VTE on admission, unable to change text in previous EMR field)    35-year-old white male   previously evaluated for urinary retention and disposition back to group home with Sarabia catheter     Presents to ED 6/19/2025 stating he was going to hurt himself if he was sent back to the group home but then admitted to staff that the only reason why he made comments was to be admitted.    Patient denied any suicidal or homicidal ideations thereafter.      Admit observation overnight, with an psychological evaluation to disposition back to group home. Psych consult warranted also to evaluate for any psych meds that may contribute to urinary rentition and Urology follow up as patient may be experiencing malfunction.    -resume homes  -consults as above  -consult social work for coordination back to group home once medicallu and psychologically cleared  -dvt prophylaxis low risk  -diabetic diet    Intellectual disability, Impulse control disorder, Factitious disorder, Autism  Obesity  Asthma  GERD (gastroesophageal reflux disease)  History of BPH, Urinary retention,   Myopia of both eyes  Hypothyroid  HLD (hyperlipidemia)  Type 2 diabetes mellitus  H/o testicular cancer with History of orchiectomy

## 2025-06-19 NOTE — ED ADULT NURSE NOTE - OBJECTIVE STATEMENT
Pt presents to the ED reporting " my group home will not take me back because I got a Sarabia put in today." Pt was seen this AM at [facility] for urinary retention. Pt now states " I tried to hurt myself with a plastic knife." Cuts noted on L wrist, no active bleeding. No other obvious injuries. Pt denies SI/HI or hallucinations. Constant observation d/c'd as per MD ramsey.

## 2025-06-19 NOTE — ED PROVIDER NOTE - NS ED ATTENDING STATEMENT MOD
What Type Of Note Output Would You Prefer (Optional)?: Standard Output Have Your Spot(S) Been Treated In The Past?: has not been treated Hpi Title: Evaluation of Skin Lesions Year Removed: 1900 Attending Only

## 2025-06-19 NOTE — ED ADULT TRIAGE NOTE - CHIEF COMPLAINT QUOTE
As per patient he is been cutting the wrist with plastic and knife to hurt himself. Patient is from a group home.

## 2025-06-19 NOTE — H&P ADULT - HISTORY OF PRESENT ILLNESS
35-year-old white male recently evaluated for urinary retention and dispositioned back to group home with Sarabia catheter     Presents to ED 6/19/2025 stating he was going to hurt himself if he was sent back to the group home but then admitted to staff that the only reason why he made comments was to be admitted.     and have the Sarabia catheter left in place as he knew that by removing it which  what the group home wanted would be detrimental to him.   Patient denied any suicidal or homicidal ideations thereafter.

## 2025-06-19 NOTE — ED ADULT NURSE NOTE - CAS TRG GEN SKIN COLOR
Normal for race/Pink Mastoid Interpolation Flap Text: A decision was made to reconstruct the defect utilizing an interpolation axial flap and a staged reconstruction.  A telfa template was made of the defect.  This telfa template was then used to outline the mastoid interpolation flap.  The donor area for the pedicle flap was then injected with anesthesia.  The flap was excised through the skin and subcutaneous tissue down to the layer of the underlying musculature.  The pedicle flap was carefully excised within this deep plane to maintain its blood supply.  The edges of the donor site were undermined.   The donor site was closed in a primary fashion.  The pedicle was then rotated into position and sutured.  Once the tube was sutured into place, adequate blood supply was confirmed with blanching and refill.  The pedicle was then wrapped with xeroform gauze and dressed appropriately with a telfa and gauze bandage to ensure continued blood supply and protect the attached pedicle.

## 2025-06-19 NOTE — ED PROVIDER NOTE - OBJECTIVE STATEMENT
35-year-old white male seen here earlier for urinary retention discharged back to group home with Sarabia catheter and now presents initially stating that he was going to hurt himself if he was sent back to the group home but then admitted that the only reason why he made comments like that was so that he could be admitted and have the Sarabia catheter left in place as he knew that by removing it which  what the group home wanted would be detrimental to him.   Patient denies any suicidal or homicidal ideations.  No fever no chills no nausea no vomiting.

## 2025-06-19 NOTE — H&P ADULT - NSHPLABSRESULTS_GEN_ALL_CORE
Labs & Rads personally reviewed, of note...  todays labs reviewed and compare to prior admission  microcytic hgb in april  ua negative, no glucose in urine

## 2025-06-19 NOTE — PROVIDER CONTACT NOTE (OTHER) - BACKGROUND
hx DM type 2, metformin ordered with meals hx DM type 2, metformin ordered with meals, consistent carb diet

## 2025-06-19 NOTE — H&P ADULT - NSHPPHYSICALEXAM_GEN_ALL_CORE
This is a 73 yo female here to schedule a colonoscopy.  A colonoscopy 2020 demonstrated 6 precancerous polyps.  The patient denies constipation diarrhea, rectal bleeding and black stools.  It was recommended that she repeat the colonoscopy in one year but she was taking care of her father in [place] who is now 92 years old.
Vital Signs Last 24 Hrs  T(C): 36.7 (19 Jun 2025 13:56), Max: 36.7 (19 Jun 2025 13:56)  T(F): 98 (19 Jun 2025 13:56), Max: 98 (19 Jun 2025 13:56)  HR: 112 (19 Jun 2025 13:56) (93 - 112)  BP: 137/95 (19 Jun 2025 13:56) (128/94 - 150/97)  BP(mean): --  RR: 18 (19 Jun 2025 13:56) (18 - 18)  SpO2: 96% (19 Jun 2025 13:56) (94% - 99%)    Parameters below as of 19 Jun 2025 13:56  Patient On (Oxygen Delivery Method): room air    T(C): 36.7 (06-19-25 @ 13:56), Max: 36.7 (06-19-25 @ 13:56)  HR: 112 (06-19-25 @ 13:56) (93 - 112)  BP: 137/95 (06-19-25 @ 13:56) (128/94 - 150/97)  RR: 18 (06-19-25 @ 13:56) (18 - 18)  SpO2: 96% (06-19-25 @ 13:56) (94% - 99%)    CONSTITUTIONAL: Well groomed, no apparent distress  HEENT: NC/AT, PERRL, EOMI, No conjunctival or scleral injection, non-icteric, adequate dentition  NECK: Supple, symmetric and without tracheal deviation   RESP: No respiratory distress, no use of accessory muscles; CTA b/l, no high-grade adventitious sounds   CV: RRR, +S1S2, no high-grade murmur; no JVD; no peripheral edema  GI: Soft, NT, ND, no rebound, no guarding; no palpable masses  : mckeon catheter in place, mckeon leg bag with urine  MSK: No digital clubbing or cyanosis, Normal ROM without pain  SKIN: No rashes or ulcers noted; no subcutaneous nodules or induration palpable  NEURO: CN grossly intact, non-focal  PSYCH: A+O x 3, mood and affect appropriate

## 2025-06-19 NOTE — ED PROVIDER NOTE - PROGRESS NOTE DETAILS
Patient pleasant while in the emergency department completely cooperative and adamantly denied any suicidal thoughts or homicidal thoughts and just wants to be taking care of of correctly.

## 2025-06-19 NOTE — ED PROVIDER NOTE - CLINICAL SUMMARY MEDICAL DECISION MAKING FREE TEXT BOX
Young white male with urinary retention Sarabia catheter inserted unable to go back to facility where he resides as they are unable to care for him with catheter in place here now requiring full evaluation placement of the Sarabia catheter in brief hospitalization followed by rehabilitation until which time catheter was removed so that patient can return back to his present group home.

## 2025-06-19 NOTE — PROVIDER CONTACT NOTE (OTHER) - ACTION/TREATMENT ORDERED:
Provider states he will review chart, no new orders.  6/20 0624 Upon following up, provider once again states he will review chart. No new orders. 6/19 2230: Provider states he will review chart, no new orders.  6/20 0624: RN again reached out to follow up, provider again states he will review chart. No new orders.

## 2025-06-19 NOTE — H&P ADULT - NSHPREVIEWOFSYSTEMS_GEN_ALL_CORE
ROS:   -No fevers, no chills, no chest pain, no diarrhea  -Besides above Rest of 14 ROS are negative

## 2025-06-20 DIAGNOSIS — F79 UNSPECIFIED INTELLECTUAL DISABILITIES: ICD-10-CM

## 2025-06-20 LAB
ANION GAP SERPL CALC-SCNC: 10 MMOL/L — SIGNIFICANT CHANGE UP (ref 5–17)
BASOPHILS # BLD AUTO: 0.03 K/UL — SIGNIFICANT CHANGE UP (ref 0–0.2)
BASOPHILS NFR BLD AUTO: 0.4 % — SIGNIFICANT CHANGE UP (ref 0–2)
BUN SERPL-MCNC: 14 MG/DL — SIGNIFICANT CHANGE UP (ref 7–23)
CALCIUM SERPL-MCNC: 8.7 MG/DL — SIGNIFICANT CHANGE UP (ref 8.5–10.1)
CHLORIDE SERPL-SCNC: 107 MMOL/L — SIGNIFICANT CHANGE UP (ref 96–108)
CO2 SERPL-SCNC: 22 MMOL/L — SIGNIFICANT CHANGE UP (ref 22–31)
CREAT SERPL-MCNC: 0.82 MG/DL — SIGNIFICANT CHANGE UP (ref 0.5–1.3)
EGFR: 117 ML/MIN/1.73M2 — SIGNIFICANT CHANGE UP
EGFR: 117 ML/MIN/1.73M2 — SIGNIFICANT CHANGE UP
EOSINOPHIL # BLD AUTO: 0.23 K/UL — SIGNIFICANT CHANGE UP (ref 0–0.5)
EOSINOPHIL NFR BLD AUTO: 2.8 % — SIGNIFICANT CHANGE UP (ref 0–6)
GLUCOSE SERPL-MCNC: 141 MG/DL — HIGH (ref 70–99)
HCT VFR BLD CALC: 42.8 % — SIGNIFICANT CHANGE UP (ref 39–50)
HGB BLD-MCNC: 14 G/DL — SIGNIFICANT CHANGE UP (ref 13–17)
IMM GRANULOCYTES # BLD AUTO: 0.05 K/UL — SIGNIFICANT CHANGE UP (ref 0–0.07)
IMM GRANULOCYTES NFR BLD AUTO: 0.6 % — SIGNIFICANT CHANGE UP (ref 0–0.9)
LYMPHOCYTES # BLD AUTO: 2.89 K/UL — SIGNIFICANT CHANGE UP (ref 1–3.3)
LYMPHOCYTES NFR BLD AUTO: 34.9 % — SIGNIFICANT CHANGE UP (ref 13–44)
MCHC RBC-ENTMCNC: 26.2 PG — LOW (ref 27–34)
MCHC RBC-ENTMCNC: 32.7 G/DL — SIGNIFICANT CHANGE UP (ref 32–36)
MCV RBC AUTO: 80 FL — SIGNIFICANT CHANGE UP (ref 80–100)
MONOCYTES # BLD AUTO: 0.62 K/UL — SIGNIFICANT CHANGE UP (ref 0–0.9)
MONOCYTES NFR BLD AUTO: 7.5 % — SIGNIFICANT CHANGE UP (ref 2–14)
NEUTROPHILS # BLD AUTO: 4.46 K/UL — SIGNIFICANT CHANGE UP (ref 1.8–7.4)
NEUTROPHILS NFR BLD AUTO: 53.8 % — SIGNIFICANT CHANGE UP (ref 43–77)
NRBC # BLD AUTO: 0 K/UL — SIGNIFICANT CHANGE UP (ref 0–0)
NRBC # FLD: 0 K/UL — SIGNIFICANT CHANGE UP (ref 0–0)
NRBC BLD AUTO-RTO: 0 /100 WBCS — SIGNIFICANT CHANGE UP (ref 0–0)
PLATELET # BLD AUTO: 217 K/UL — SIGNIFICANT CHANGE UP (ref 150–400)
PMV BLD: 12.1 FL — SIGNIFICANT CHANGE UP (ref 7–13)
POTASSIUM SERPL-MCNC: 4.8 MMOL/L — SIGNIFICANT CHANGE UP (ref 3.5–5.3)
POTASSIUM SERPL-SCNC: 4.8 MMOL/L — SIGNIFICANT CHANGE UP (ref 3.5–5.3)
RBC # BLD: 5.35 M/UL — SIGNIFICANT CHANGE UP (ref 4.2–5.8)
RBC # FLD: 15.2 % — HIGH (ref 10.3–14.5)
SODIUM SERPL-SCNC: 139 MMOL/L — SIGNIFICANT CHANGE UP (ref 135–145)
TSH SERPL-MCNC: 2.83 UIU/ML — SIGNIFICANT CHANGE UP (ref 0.36–3.74)
WBC # BLD: 8.28 K/UL — SIGNIFICANT CHANGE UP (ref 3.8–10.5)
WBC # FLD AUTO: 8.28 K/UL — SIGNIFICANT CHANGE UP (ref 3.8–10.5)

## 2025-06-20 PROCEDURE — 90792 PSYCH DIAG EVAL W/MED SRVCS: CPT | Mod: 95

## 2025-06-20 PROCEDURE — 99233 SBSQ HOSP IP/OBS HIGH 50: CPT

## 2025-06-20 RX ORDER — DEXTROSE 50 % IN WATER 50 %
25 SYRINGE (ML) INTRAVENOUS ONCE
Refills: 0 | Status: DISCONTINUED | OUTPATIENT
Start: 2025-06-20 | End: 2025-06-23

## 2025-06-20 RX ORDER — DEXTROSE 50 % IN WATER 50 %
15 SYRINGE (ML) INTRAVENOUS ONCE
Refills: 0 | Status: DISCONTINUED | OUTPATIENT
Start: 2025-06-20 | End: 2025-06-23

## 2025-06-20 RX ORDER — INSULIN LISPRO 100 U/ML
INJECTION, SOLUTION INTRAVENOUS; SUBCUTANEOUS AT BEDTIME
Refills: 0 | Status: DISCONTINUED | OUTPATIENT
Start: 2025-06-20 | End: 2025-06-23

## 2025-06-20 RX ORDER — GLUCAGON 3 MG/1
1 POWDER NASAL ONCE
Refills: 0 | Status: DISCONTINUED | OUTPATIENT
Start: 2025-06-20 | End: 2025-06-23

## 2025-06-20 RX ORDER — SODIUM CHLORIDE 9 G/1000ML
1000 INJECTION, SOLUTION INTRAVENOUS
Refills: 0 | Status: DISCONTINUED | OUTPATIENT
Start: 2025-06-20 | End: 2025-06-23

## 2025-06-20 RX ORDER — DEXTROSE 50 % IN WATER 50 %
12.5 SYRINGE (ML) INTRAVENOUS ONCE
Refills: 0 | Status: DISCONTINUED | OUTPATIENT
Start: 2025-06-20 | End: 2025-06-23

## 2025-06-20 RX ORDER — TAMSULOSIN HYDROCHLORIDE 0.4 MG/1
0.8 CAPSULE ORAL AT BEDTIME
Refills: 0 | Status: DISCONTINUED | OUTPATIENT
Start: 2025-06-20 | End: 2025-06-23

## 2025-06-20 RX ORDER — INSULIN LISPRO 100 U/ML
INJECTION, SOLUTION INTRAVENOUS; SUBCUTANEOUS
Refills: 0 | Status: DISCONTINUED | OUTPATIENT
Start: 2025-06-20 | End: 2025-06-23

## 2025-06-20 RX ADMIN — Medication 4 MILLIGRAM(S): at 09:37

## 2025-06-20 RX ADMIN — Medication 10 MILLIGRAM(S): at 11:36

## 2025-06-20 RX ADMIN — ATORVASTATIN CALCIUM 10 MILLIGRAM(S): 80 TABLET, FILM COATED ORAL at 21:03

## 2025-06-20 RX ADMIN — Medication 20 MILLIGRAM(S): at 05:30

## 2025-06-20 RX ADMIN — TAMSULOSIN HYDROCHLORIDE 0.8 MILLIGRAM(S): 0.4 CAPSULE ORAL at 21:03

## 2025-06-20 RX ADMIN — Medication 0.3 MILLIGRAM(S): at 05:30

## 2025-06-20 RX ADMIN — Medication 4 MILLIGRAM(S): at 21:00

## 2025-06-20 RX ADMIN — LITHIUM CARBONATE 300 MILLIGRAM(S): 600 CAPSULE, GELATIN COATED ORAL at 21:05

## 2025-06-20 RX ADMIN — METFORMIN HYDROCHLORIDE 1000 MILLIGRAM(S): 500 TABLET ORAL at 09:37

## 2025-06-20 RX ADMIN — Medication 0.3 MILLIGRAM(S): at 18:10

## 2025-06-20 RX ADMIN — Medication 325 MILLIGRAM(S): at 21:03

## 2025-06-20 RX ADMIN — METFORMIN HYDROCHLORIDE 1000 MILLIGRAM(S): 500 TABLET ORAL at 18:10

## 2025-06-20 RX ADMIN — Medication 2 TABLET(S): at 21:03

## 2025-06-20 RX ADMIN — TESTOSTERONE 100 MILLIGRAM(S): 5.5 GEL NASAL at 11:36

## 2025-06-20 RX ADMIN — Medication 1000 MILLIGRAM(S): at 21:03

## 2025-06-20 RX ADMIN — Medication 75 MICROGRAM(S): at 05:30

## 2025-06-20 NOTE — BH CONSULTATION LIAISON ASSESSMENT NOTE - CURRENT MEDICATION
MEDICATIONS  (STANDING):  atorvastatin 10 milliGRAM(s) Oral at bedtime  cetirizine 10 milliGRAM(s) Oral daily  cloNIDine 0.3 milliGRAM(s) Oral two times a day  dextrose 5%. 1000 milliLiter(s) (50 mL/Hr) IV Continuous <Continuous>  dextrose 5%. 1000 milliLiter(s) (100 mL/Hr) IV Continuous <Continuous>  dextrose 50% Injectable 25 Gram(s) IV Push once  dextrose 50% Injectable 12.5 Gram(s) IV Push once  dextrose 50% Injectable 25 Gram(s) IV Push once  divalproex ER 1000 milliGRAM(s) Oral at bedtime  ferrous    sulfate 325 milliGRAM(s) Oral at bedtime  glucagon  Injectable 1 milliGRAM(s) IntraMuscular once  insulin lispro (ADMELOG) corrective regimen sliding scale   SubCutaneous three times a day before meals  insulin lispro (ADMELOG) corrective regimen sliding scale   SubCutaneous at bedtime  levothyroxine 75 MICROGram(s) Oral daily  lithium 300 milliGRAM(s) Oral at bedtime  metFORMIN 1000 milliGRAM(s) Oral two times a day with meals  pantoprazole    Tablet 20 milliGRAM(s) Oral before breakfast  risperiDONE   Tablet 4 milliGRAM(s) Oral every 12 hours  senna 2 Tablet(s) Oral at bedtime  tamsulosin 0.4 milliGRAM(s) Oral at bedtime  testosterone 1% Gel 100 milliGRAM(s) Topical daily    MEDICATIONS  (PRN):  acetaminophen     Tablet .. 650 milliGRAM(s) Oral every 6 hours PRN Temp greater or equal to 38C (100.4F), Mild Pain (1 - 3)  aluminum hydroxide/magnesium hydroxide/simethicone Suspension 30 milliLiter(s) Oral every 4 hours PRN Dyspepsia  dextrose Oral Gel 15 Gram(s) Oral once PRN Blood Glucose LESS THAN 70 milliGRAM(s)/deciliter  lidocaine 2% Jelly 5 milliLiter(s) Topical every 6 hours PRN severe pain  melatonin 3 milliGRAM(s) Oral at bedtime PRN Insomnia  ondansetron Injectable 4 milliGRAM(s) IV Push every 8 hours PRN Nausea and/or Vomiting

## 2025-06-20 NOTE — BH CONSULTATION LIAISON ASSESSMENT NOTE - NS ED BHA HOMICIDALITY PRESENT AGGRESSION OTHERS PAST MONTH
Physical Therapy  Visit Type: initial evaluation  Co-treat with: CNA present to assist.  Precautions:  Medical precautions: ; standard precautions. PMH includes: Parkinson's disease, S/P (R) TKA 2017 and S/P (L) TKA 2016  Lines:     Basic: IV and telemetry  Safety Measures: chair alarm    SUBJECTIVE  Patient agreed to participate in therapy this date.  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  Reports that he is willing to get out of bed and sit in the bedside chair.    Per social work note: Patient ambulates 1x/day with staff at the Memory Care Unit (at [facility]).  Patient / Family Goal: maximize function    Pain     Location: no c/o pain    At onset of session (out of 10): 0     OBJECTIVE     Oriented to person     Disoriented to place, time and situation    Arousal alertness: delayed responses to stimuli    Affect/Behavior: flat, calm and cooperative  Patient activity tolerance: 1 to 2 activity to rest  Functional Communication/Cognition       Commands: follows one step commands with increased time.  Range of Motion (measured in degrees unless otherwise noted, active unless indicated)  WFL: RLE, LLE, except as noted  Comments / Details: (B) knee AROM ~30-90 degrees.  Strength (out of 5 unless otherwise indicated)   Comments / Details:  (B) strength deficits noted during transfers and gait activities.  Balance    Sitting: Static: supervision double lower extremity support, Dynamic: minimal assist double lower extremity support    Standing - Firm Surface - Eyes Open: Static: total assist - non-dependentStanding static: mod assist x2. Dynamic: total assist - non-dependentStanding dynamic: mod assist x2.  Balance Details: Patient requires (B) UE support on the wheeled walker and total assist (mod assist x2) - patient tends to lean posteriorly.  Neurological Comments / Details: Decreased (B) UE and LE coordination.  Delayed motor planning.    Bed Mobility:        Supine to sit: minimal  assist  Training completed:    Tasks: supine to sit    Education details: body mechanics, patient safety and patient requires additional training  Transfers:    Assistive devices: 2-wheeled walker and gait belt    Sit to stand: total assist - non-dependent (mod assist x2)    Stand to sit: total assist - non-dependent (mod assist x2)  Training completed:    Tasks: sit to stand and stand to sit    Education details: body mechanics, patient safety and patient requires additional training    Patient requires manual and verbal cueing to achieve proper anterior weight-shifting - patient leans posteriorly.  Patient also requires manual and verbal cueing for proper/safe hand placement.  Gait/Ambulation:     Assistance: total assist - non-dependent (mod assist x2)   Assistive device: 2-wheeled walker    Distance (ft): 5    Type: shuffling (short step lengths (B) with significant knee flexion noted throughout, ~40 degrees (B))  Training Completed:      Education details: body mechanics, patient safety and patient requires additional training    Manual and verbal cueing required to achieve and maintain anterior weight-shifting and for walker management.      Interventions     Seated    Lower Extremity: Bilateral: knee extensions, seated hip flexion, toe raises, heel raises, hip adduction and hip abduction, AROM, 10 reps, 1 sets  Additional exercise details: Manual hamstring stretching and manual calf stretching completed in sitting, 30 sec holds, one repetition each LE.  Training provided: activity tolerance, balance retraining, bed mobility training, functional ambulation, gait training, HEP training and transfer training    Skilled input: Verbal instruction/cues  Verbal Consent: Writer verbally educated and received verbal consent for hand placement, positioning of patient, and techniques to be performed today from patient for clothing adjustments for techniques, therapist position for techniques and hand placement and  palpation for techniques as described above and how they are pertinent to the patient's plan of care.       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  Patient's medical history also includes Parkinson's and (B) TKA with residual knee flexion contractures.  Patient demonstrates that he is below his baseline level of mobility and currently requires assistance x2 to complete stand-pivot transfers.  It appears that this patient will benefit from subacute rehab in order to maximize his functional status in order to return to his [facility]  If the [facility] is able to provide assistance x2, then it would be possible for him to return there with home therapy services.       Discharge Recommendations  Recommendation for Discharge: PT WI: Sub-acute nursing home (pending progress and level of assistance available at [facility]                    Skilled therapy is required to address these limitations in attempt to maximize the patient's independence.  Progress: progressing toward goals    Pain at end of session:  0/10, location: no c/o pain  Predicted patient presentation: Moderate (evolving) - Patient comorbidities and complexities, as defined above, may have varying impact on steady progress for prescribed plan of care.    End of Session:   Location: in chair  Safety measures: alarm system in place/re-engaged, call light within reach and lines intact  Handoff to: nurse    PLAN    Suggestions for next session as indicated: Bring aide for safety, stand-pivot transfer to a wheelchair, progress gait with w/c follow, initiate w/c mobility.    PT Frequency: 4 days/week  Frequency Comments: 1    Interventions: balance, bed mobility, gait training, HEP train/position, strengthening, ROM and wheelchair mobility  Agreement  to plan and goals: patient agrees with goals and treatment plan        GOALS  Review Date: 7/20/2021  Long Term Goals: (to be met by time of discharge from hospital)  Sit to supine: Patient will complete sit to supine contact guard or touching/steadying.  Status: progressing/ongoing  Supine to sit: Patient will complete supine to sit contact guard or touching/steadying.  Status: progressing/ongoing  Sit to stand: Patient will complete sit to stand transfer with 2-wheeled walker, minimal assist.   Status: progressing/ongoing  Stand to sit: Patient will complete stand to sit transfer with 2-wheeled walker, minimal assist.   Status: progressing/ongoing  Ambulation (even): Patient will ambulate on even surface for 20 feet with 2-wheeled walker, minimal assist.   Status: progressing/ongoing  Wheelchair mobility (level): Patient complete wheelchair mobility over level surfaces 50 feet, using manual propel method, supervision.   Status: not met  Home program: patient independent with home program as instructed.   Status: progressing/ongoing    Documented in the chart in the following areas: Assessment. Patient Education.      Therapy procedure time and total treatment time can be found documented on the Time Entry flowsheet   Yes

## 2025-06-20 NOTE — CONSULT NOTE ADULT - SUBJECTIVE AND OBJECTIVE BOX
Urology Consult    36yo Male    PAST MEDICAL & SURGICAL HISTORY:  Intellectual disability      Obesity      Asthma      GERD (gastroesophageal reflux disease)      Factitious disorder      Urinary retention      Enuresis      Myopia of both eyes      Autism      Hypothyroid      History of BPH      HLD (hyperlipidemia)      Impulse control disorder      Type 2 diabetes mellitus      Testicular cancer      Autism      History of orchiectomy          MEDICATIONS  (STANDING):  atorvastatin 10 milliGRAM(s) Oral at bedtime  cetirizine 10 milliGRAM(s) Oral daily  cloNIDine 0.3 milliGRAM(s) Oral two times a day  dextrose 5%. 1000 milliLiter(s) (50 mL/Hr) IV Continuous <Continuous>  dextrose 5%. 1000 milliLiter(s) (100 mL/Hr) IV Continuous <Continuous>  dextrose 50% Injectable 25 Gram(s) IV Push once  dextrose 50% Injectable 12.5 Gram(s) IV Push once  dextrose 50% Injectable 25 Gram(s) IV Push once  divalproex ER 1000 milliGRAM(s) Oral at bedtime  ferrous    sulfate 325 milliGRAM(s) Oral at bedtime  glucagon  Injectable 1 milliGRAM(s) IntraMuscular once  insulin lispro (ADMELOG) corrective regimen sliding scale   SubCutaneous three times a day before meals  insulin lispro (ADMELOG) corrective regimen sliding scale   SubCutaneous at bedtime  levothyroxine 75 MICROGram(s) Oral daily  lithium 300 milliGRAM(s) Oral at bedtime  metFORMIN 1000 milliGRAM(s) Oral two times a day with meals  pantoprazole    Tablet 20 milliGRAM(s) Oral before breakfast  risperiDONE   Tablet 4 milliGRAM(s) Oral every 12 hours  senna 2 Tablet(s) Oral at bedtime  tamsulosin 0.4 milliGRAM(s) Oral at bedtime  testosterone 1% Gel 100 milliGRAM(s) Topical daily    MEDICATIONS  (PRN):  acetaminophen     Tablet .. 650 milliGRAM(s) Oral every 6 hours PRN Temp greater or equal to 38C (100.4F), Mild Pain (1 - 3)  aluminum hydroxide/magnesium hydroxide/simethicone Suspension 30 milliLiter(s) Oral every 4 hours PRN Dyspepsia  dextrose Oral Gel 15 Gram(s) Oral once PRN Blood Glucose LESS THAN 70 milliGRAM(s)/deciliter  lidocaine 2% Jelly 5 milliLiter(s) Topical every 6 hours PRN severe pain  melatonin 3 milliGRAM(s) Oral at bedtime PRN Insomnia  ondansetron Injectable 4 milliGRAM(s) IV Push every 8 hours PRN Nausea and/or Vomiting      Allergies    Zyprexa (Unknown)  Bee stings (Unknown)  Zyprexa (Dystonic RXN)  Haldol (Rash)  Haldol (Other)  Celebrex (Short breath)    Intolerances        SOCIAL HISTORY: No illicit drug use    FAMILY HISTORY:  FH: diabetes mellitus (Mother)        REVIEW OF SYSTEMS   [x] A ten-point review of systems was otherwise negative except as noted.  [ ] Due to altered mental status/intubation, subjective information were not able to be obtained from patient. History was obtained, to the extent possible, from review of the chart and collateral sources of information.    Vital Signs Last 24 Hrs  T(C): 36.3 (20 Jun 2025 11:10), Max: 36.8 (19 Jun 2025 21:03)  T(F): 97.4 (20 Jun 2025 11:10), Max: 98.3 (19 Jun 2025 21:03)  HR: 72 (20 Jun 2025 11:10) (72 - 112)  BP: 101/71 (20 Jun 2025 11:10) (101/71 - 150/97)  BP(mean): 105 (19 Jun 2025 21:03) (105 - 105)  RR: 18 (20 Jun 2025 11:10) (18 - 18)  SpO2: 95% (20 Jun 2025 11:10) (92% - 100%)    Parameters below as of 20 Jun 2025 11:10  Patient On (Oxygen Delivery Method): room air        PHYSICAL EXAM:    GEN: NAD, awake and alert.  SKIN: Good color, non diaphoretic.  RESP: Non-labored breathing. No use of accessory muscles.  CARDIO: +S1/S2  ABDO: Soft, NT/ND, no palpable bladder, no suprapubic tenderness/ + Suprapubic Tube draining clear yellow urine.  BACK: No CVAT B/L  : + Circumcised / Non circumcised male. B/L descended testicles x 2. No lesions or palpable masses noted B/L. No meatal discharge. + Indwelling Sarabia in place, draining clear yellow urine.   EXT: SALAS x 4      I&O's Summary    19 Jun 2025 07:01  -  20 Jun 2025 07:00  --------------------------------------------------------  IN: 240 mL / OUT: 1150 mL / NET: -910 mL        LABS:                        14.0   8.28  )-----------( 217      ( 20 Jun 2025 07:15 )             42.8     06-20    139  |  107  |  14  ----------------------------<  141[H]  4.8   |  22  |  0.82    Ca    8.7      20 Jun 2025 07:15        Urinalysis Basic - ( 20 Jun 2025 07:15 )    Color: x / Appearance: x / SG: x / pH: x  Gluc: 141 mg/dL / Ketone: x  / Bili: x / Urobili: x   Blood: x / Protein: x / Nitrite: x   Leuk Esterase: x / RBC: x / WBC x   Sq Epi: x / Non Sq Epi: x / Bacteria: x            RADIOLOGY & ADDITIONAL STUDIES: Urology Consult    34yo Male with extensive PMH of Bipolar Disorder, Autism, ADHD, MR, Depression, behavioral disorder requiring previous inpatient psychiatric admissions and constant 1:1 observation, Hypothyroidism, Asthma, DM2, HTN, HLD, hx testicular cancer s/p b/l orchiectomy, and urinary retention, for which he has had intermittent need for mckeon placement.  Per medicine pt's group home does not have the resources to take care and manage the mckeon cath." Pt denies any c/o dysuria, abd pain, fever or chills.    PAST MEDICAL & SURGICAL HISTORY:  Intellectual disability      Obesity      Asthma      GERD (gastroesophageal reflux disease)      Factitious disorder      Urinary retention      Enuresis      Myopia of both eyes      Autism      Hypothyroid      History of BPH      HLD (hyperlipidemia)      Impulse control disorder      Type 2 diabetes mellitus      Testicular cancer      Autism      History of orchiectomy          MEDICATIONS  (STANDING):  atorvastatin 10 milliGRAM(s) Oral at bedtime  cetirizine 10 milliGRAM(s) Oral daily  cloNIDine 0.3 milliGRAM(s) Oral two times a day  dextrose 5%. 1000 milliLiter(s) (50 mL/Hr) IV Continuous <Continuous>  dextrose 5%. 1000 milliLiter(s) (100 mL/Hr) IV Continuous <Continuous>  dextrose 50% Injectable 25 Gram(s) IV Push once  dextrose 50% Injectable 12.5 Gram(s) IV Push once  dextrose 50% Injectable 25 Gram(s) IV Push once  divalproex ER 1000 milliGRAM(s) Oral at bedtime  ferrous    sulfate 325 milliGRAM(s) Oral at bedtime  glucagon  Injectable 1 milliGRAM(s) IntraMuscular once  insulin lispro (ADMELOG) corrective regimen sliding scale   SubCutaneous three times a day before meals  insulin lispro (ADMELOG) corrective regimen sliding scale   SubCutaneous at bedtime  levothyroxine 75 MICROGram(s) Oral daily  lithium 300 milliGRAM(s) Oral at bedtime  metFORMIN 1000 milliGRAM(s) Oral two times a day with meals  pantoprazole    Tablet 20 milliGRAM(s) Oral before breakfast  risperiDONE   Tablet 4 milliGRAM(s) Oral every 12 hours  senna 2 Tablet(s) Oral at bedtime  tamsulosin 0.4 milliGRAM(s) Oral at bedtime  testosterone 1% Gel 100 milliGRAM(s) Topical daily    MEDICATIONS  (PRN):  acetaminophen     Tablet .. 650 milliGRAM(s) Oral every 6 hours PRN Temp greater or equal to 38C (100.4F), Mild Pain (1 - 3)  aluminum hydroxide/magnesium hydroxide/simethicone Suspension 30 milliLiter(s) Oral every 4 hours PRN Dyspepsia  dextrose Oral Gel 15 Gram(s) Oral once PRN Blood Glucose LESS THAN 70 milliGRAM(s)/deciliter  lidocaine 2% Jelly 5 milliLiter(s) Topical every 6 hours PRN severe pain  melatonin 3 milliGRAM(s) Oral at bedtime PRN Insomnia  ondansetron Injectable 4 milliGRAM(s) IV Push every 8 hours PRN Nausea and/or Vomiting      Allergies  Zyprexa (Unknown)  Bee stings (Unknown)  Zyprexa (Dystonic RXN)  Haldol (Rash)  Haldol (Other)  Celebrex (Short breath)    SOCIAL HISTORY: No illicit drug use    FAMILY HISTORY:  FH: diabetes mellitus (Mother)    REVIEW OF SYSTEMS   [x] A ten-point review of systems was otherwise negative except as noted.  [ ] Due to altered mental status/intubation, subjective information were not able to be obtained from patient. History was obtained, to the extent possible, from review of the chart and collateral sources of information.    Vital Signs Last 24 Hrs  T(C): 36.3 (20 Jun 2025 11:10), Max: 36.8 (19 Jun 2025 21:03)  T(F): 97.4 (20 Jun 2025 11:10), Max: 98.3 (19 Jun 2025 21:03)  HR: 72 (20 Jun 2025 11:10) (72 - 112)  BP: 101/71 (20 Jun 2025 11:10) (101/71 - 150/97)  BP(mean): 105 (19 Jun 2025 21:03) (105 - 105)  RR: 18 (20 Jun 2025 11:10) (18 - 18)  SpO2: 95% (20 Jun 2025 11:10) (92% - 100%)    Parameters below as of 20 Jun 2025 11:10  Patient On (Oxygen Delivery Method): room air        PHYSICAL EXAM:    GEN: NAD, awake and alert.  SKIN: Good color, non diaphoretic.  RESP: Non-labored breathing. No use of accessory muscles.  CARDIO: +S1/S2  ABDO: Soft, NT/ND, no palpable bladder, no suprapubic tenderness   BACK: No CVAT B/L  : Indwelling Mckeon in place, draining clear yellow urine.   EXT: SALAS x 4      I&O's Summary    19 Jun 2025 07:01  -  20 Jun 2025 07:00  --------------------------------------------------------  IN: 240 mL / OUT: 1150 mL / NET: -910 mL        LABS:                        14.0   8.28  )-----------( 217      ( 20 Jun 2025 07:15 )             42.8     06-20    139  |  107  |  14  ----------------------------<  141[H]  4.8   |  22  |  0.82    Ca    8.7      20 Jun 2025 07:15        Urinalysis Basic - ( 20 Jun 2025 07:15 )    Color: x / Appearance: x / SG: x / pH: x  Gluc: 141 mg/dL / Ketone: x  / Bili: x / Urobili: x   Blood: x / Protein: x / Nitrite: x   Leuk Esterase: x / RBC: x / WBC x   Sq Epi: x / Non Sq Epi: x / Bacteria: x    RECENT CULTURES:            RADIOLOGY & ADDITIONAL STUDIES:  < from: CT Abdomen and Pelvis w/ IV Cont (03.25.25 @ 17:01) >    EXAM: [id number] - CT ABDOMEN AND PELVIS IC  - ORDERED BY: RADHA DE      PROCEDURE DATE:  03/25/2025           INTERPRETATION:  CLINICAL INFORMATION: History of right testicular cancer.    COMPARISON: January 8, 2025.    CONTRAST/COMPLICATIONS:  IVContrast: Omnipaque 300  90 cc administered   10 cc discarded  Oral Contrast: NONE  Patient refused oral contrast.    PROCEDURE:  CT of the Abdomen and Pelvis was performed.  Sagittal and coronal reformats were performed.    FINDINGS:  LOWER CHEST: Stable trace bilateral layering pleural effusions.   Otherwise, the lung bases are clear.    LIVER: Moderate diffuse decreased attenuation of the liver, compatible   with steatosis. Otherwise, within normal limits.  BILE DUCTS: Normal caliber.  GALLBLADDER: Within normal limits.  SPLEEN: Within normal limits.  PANCREAS: Within normal limits.  ADRENALS: Within normal limits.  KIDNEYS/URETERS: Within normal limits.    BLADDER: Collapsed.  REPRODUCTIVE ORGANS: Right orchiectomy. The prostate is not enlarged.    BOWEL: No bowel obstruction. Appendix is normal.  PERITONEUM/RETROPERITONEUM: Within normal limits.  VESSELS: Within normal limits.  LYMPH NODES: No lymphadenopathy.  ABDOMINAL WALL: Within normal limits.  BONES: Within normal limits.    IMPRESSION: No evidence of metastatic disease or adenopathy.      < end of copied text >

## 2025-06-20 NOTE — BH CONSULTATION LIAISON ASSESSMENT NOTE - OTHER
limited [place] intermittent SI, denies SI at this time [facility] reports AH, states "I don't know" when asked to elaborate  does not present IP or RIS "I want to go to psych" [facility]

## 2025-06-20 NOTE — SOCIAL WORK PROGRESS NOTE - NSSWPROGRESSNOTE_GEN_ALL_CORE
As per DJ at group home- [place] ppwk to be emailed on Sunday in anticipation of monday- pt requires voiding trial before he can return back to the group home. Email to [email or web address], MD made aware. SW to follow.

## 2025-06-20 NOTE — BH CONSULTATION LIAISON ASSESSMENT NOTE - NSBHCHARTREVIEWVS_PSY_A_CORE FT
Vital Signs Last 24 Hrs  T(C): 36.3 (20 Jun 2025 11:10), Max: 36.8 (19 Jun 2025 21:03)  T(F): 97.4 (20 Jun 2025 11:10), Max: 98.3 (19 Jun 2025 21:03)  HR: 72 (20 Jun 2025 11:10) (72 - 112)  BP: 101/71 (20 Jun 2025 11:10) (101/71 - 150/97)  BP(mean): 105 (19 Jun 2025 21:03) (105 - 105)  RR: 18 (20 Jun 2025 11:10) (18 - 18)  SpO2: 95% (20 Jun 2025 11:10) (92% - 100%)    Parameters below as of 20 Jun 2025 11:10  Patient On (Oxygen Delivery Method): room air

## 2025-06-20 NOTE — BH CONSULTATION LIAISON ASSESSMENT NOTE - HPI (INCLUDE ILLNESS QUALITY, SEVERITY, DURATION, TIMING, CONTEXT, MODIFYING FACTORS, ASSOCIATED SIGNS AND SYMPTOMS)
35yo M, single, domiciled at [facility] (with 24hr 1:1), past medical history of asthma, DM, BPH, urinary retention, hx testicular cancer s/p orchidectomy, GERD, hypothyroidism, HTN, HLD, myopia, past psych history of autism spectrum disorder, moderate intellectual disabilty, other diagnoses including bipolar disorder, impulse control disorder, HAVEN, mood disorder NOS, PTSD, ADHD, factitious disorder, hx multiple past psych admissions, last in 4/2024, hx of NSSIB via cutting, per chart longstanding history of SI (situational: from discomfort due to environment or situation), frequent presentations to the ED, hx of aggression towards staff at [facility] BIB EMS activated for urinary retention, self harm (scratched head and left arm). Psych consulted for evaluation and for review of medications to see if any could contribute to anticholinergic activity. Reviewed meds with Dr. Johnston, and pt is currently on Clonidine 0.3mg BID, Depakote 500qAM/1000qHS, Lithium 300mg qHS, Risperdal 4mg q12.     On assessment, patient is AOx4, calm, cooperative, and answering questions appropriately. Patient perseverates "I want to go to psych." States he is "not good" and that "I cut myself at home, I was not feeling good." When asked what triggered him, patient replies "nothing" but then elaborates that he is feeling lonely, that he thinks about his ex-girlfriend from time to time. Patient reports he wants to go to psychiatry so that he can go to more groups and socialize more. Currently patient denies any passive or active SI. Patient denies any thoughts of harming others or HI. Patient reports he hears voices but when asked to elaborates states "I don't know," appears to be presenting symptoms to be admitted to psychiatry as pt does not present internally preoccupied or responding to internal stimuli at this time. Patient reports his mood varies. Denies any pain anywhere.    Spoke to DJ at patient's residence . 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 States that staffing changes have cause pt to be more anxious but otherwise pt has been at his baseline. She reports patient has chronic SI, and reports SI but then several hours later recants and states he is ready to go home. She denies any psychiatric reasons patient would not be able to return to group home. However, states that the residence cannot accept patient back with a mckeon catheter.  33yo M, single, domiciled at [facility] (with 24hr 1:1), past medical history of asthma, DM, BPH, urinary retention, hx testicular cancer s/p orchidectomy, GERD, hypothyroidism, HTN, HLD, myopia, past psych history of autism spectrum disorder, moderate intellectual disabilty, other diagnoses including bipolar disorder, impulse control disorder, HAVEN, mood disorder NOS, PTSD, ADHD, factitious disorder, hx multiple past psych admissions, last in 4/2024, hx of NSSIB via cutting, per chart longstanding history of SI (situational: from discomfort due to environment or situation), frequent presentations to the ED, hx of aggression towards staff at [facility] BIB EMS activated for urinary retention, self harm (scratched head and left arm). Psych consulted for evaluation and for review of medications to see if any could contribute to anticholinergic activity. Reviewed meds with Dr. Johnston, and pt is currently on Clonidine 0.3mg BID, Depakote 500qAM/1000qHS, Lithium 300mg qHS, Risperdal 4mg q12.     On assessment, patient is AOx4, calm, cooperative, and answering questions appropriately. Patient perseverates "I want to go to psych." States he is "not good" and that "I cut myself at home, I was not feeling good." When asked what triggered him, patient replies "nothing" but then elaborates that he is feeling lonely, that he thinks about his ex-girlfriend from time to time. Patient reports he wants to go to psychiatry so that he can go to more groups and socialize more. Currently patient denies any passive or active SI. Patient denies any thoughts of harming others or HI. Patient reports he hears voices but when asked to elaborates states "I don't know," appears to be presenting symptoms to be admitted to psychiatry as pt does not present internally preoccupied or responding to internal stimuli at this time. Patient reports his mood varies. Denies any pain anywhere.    Spoke to DJ at patient's residence . 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 States that staffing changes have caused pt to be more anxious but otherwise pt has been at his baseline. She reports patient has chronic SI, and reports SI but then several hours later recants and states he is ready to go home. She denies any psychiatric reasons patient would not be able to return to group home. However, states that the residence cannot accept patient back with a mckeon catheter.

## 2025-06-20 NOTE — CONSULT NOTE ADULT - ASSESSMENT
34 yo male with extensive PMH as above and pertinent hx of recurrent urinary retention requiring indwelling mckeon now admitted for urinary retention.    UA is not concerning for UTI, follow up UCx and treat accordingly  Increase flomax 0.8mg qhs  Bowel regimen  Avoid anticholinergic agents if possible  TOV prior to discharge and outpatient urology follow up  Will discuss with attending

## 2025-06-20 NOTE — BH CONSULTATION LIAISON ASSESSMENT NOTE - DESCRIPTION
per chart review domiciled at [facility] since 9/2024, previously lived in group home in [place], pt reports being transferred d/t bullying

## 2025-06-20 NOTE — CASE MANAGEMENT PROGRESS NOTE - NSCMPROGRESSNOTE_GEN_ALL_CORE
Update Communication Note: 35-year-old white male previously evaluated for urinary retention and disposition back to group home with Sarabia catheter .Presents to ED 6/19/2025 stating he was going to hurt himself if he was sent back to the group home but then admitted to staff that the only reason why he made comments was to be admitted. Patient alert times 3, Pending Tele Psyche Consult. MSW, following case, will continue to follow patient.

## 2025-06-20 NOTE — CARE COORDINATION ASSESSMENT. - OTHER PERTINENT DISCHARGE PLANNING INFORMATION:
KEKE met with this pt and spoke with KAYLIE from pts group home [facility]- pt well known to KEKE from previous admissions. He is here s/p threatening to the ED staff that eh wanted to harm himself. ED also placed a mckeon last night and patient cant return back to the group home with  a linda- MD made aware. Pt currently awaiting telepscyh clearance. Email CHELLE weber to [email or web address]. KEKE to follow.

## 2025-06-20 NOTE — PROVIDER CONTACT NOTE (OTHER) - SITUATION
No accuchecks ordered for patient
received patient from ER with mckeon, no order for mckeon, pt c/o of no output and bladder discomfort
received phone call from Pt's mother who stated pt is a 1:1 at group home for self harm and elopement.

## 2025-06-20 NOTE — BH CONSULTATION LIAISON ASSESSMENT NOTE - SUMMARY
35yo M, single, domiciled at [facility] (with 24hr 1:1), past medical history of asthma, DM, BPH, urinary retention, hx testicular cancer s/p orchidectomy, GERD, hypothyroidism, HTN, HLD, myopia, past psych history of autism spectrum disorder, moderate intellectual disabilty, other diagnoses including bipolar disorder, impulse control disorder, HAVEN, mood disorder NOS, PTSD, ADHD, factitious disorder, hx multiple past psych admissions, last in 4/2024, hx of NSSIB via cutting, per chart longstanding history of SI (situational: from discomfort due to environment or situation), frequent presentations to the ED, hx of aggression towards staff at [facility] BIB EMS activated for urinary retention, self harm (scratched head and left arm). Psych consulted for evaluation and for review of medications to see if any could contribute to anticholinergic activity. Reviewed meds with Dr. Johnston, and pt is currently on Clonidine 0.3mg BID, Depakote 500qAM/1000qHS, Lithium 300mg qHS, Risperdal 4mg q12.     Current presentation is unchanged from previous assessment. Patient is currently at his baseline. Patient reports he scratched himself (head and L arm) as he was suicidal and anxious and then asks to be admitted to psychiatry so that he can socialize more. Patient reports he would like to be admitted to psychiatry as he misses his exgirlfriend and wants to socialize more. Patient seems to be utilizing SI and other psych symptomatology in order to gain admission to psychiatry as a form of coping with stressors. Per residence, they had a recent change in the staffing so patient has been more anxious, other than this has been at his baseline. At this time, patient does not present acutely depressed, manic, psychotic, or with any psych safety concern that meets criteria for psych admission. He likely would not benefit from inpatient psych and this would reinforce poor coping. Would continue current psych medications; there is low likelihood that any of the would contribute to an anticholinergic burden.  35yo M, single, domiciled at [facility] (with 24hr 1:1), past medical history of asthma, DM, BPH, urinary retention, hx testicular cancer s/p orchidectomy, GERD, hypothyroidism, HTN, HLD, myopia, past psych history of autism spectrum disorder, moderate intellectual disabilty, other diagnoses including bipolar disorder, impulse control disorder, HAVEN, mood disorder NOS, PTSD, ADHD, factitious disorder, hx multiple past psych admissions, last in 4/2024, hx of NSSIB via cutting, per chart longstanding history of SI (situational: from discomfort due to environment or situation), frequent presentations to the ED, hx of aggression towards staff at [facility] BIB EMS activated for urinary retention, self harm (scratched head and left arm). Psych consulted for evaluation and for review of medications to see if any could contribute to anticholinergic activity. Reviewed meds with Dr. Johnston, and pt is currently on Clonidine 0.3mg BID, Depakote 500qAM/1000qHS, Lithium 300mg qHS, Risperdal 4mg q12.     Current presentation is similar to previous assessments. Patient is currently at his baseline. Patient reports he scratched himself (head and L arm) as he was suicidal and anxious and then asks to be admitted to psychiatry so that he can socialize more. Patient reports he would like to be admitted to psychiatry as he misses his exgirlfriend and wants to socialize more. Patient seems to be utilizing SI and other psych symptomatology in order to gain admission to psychiatry as a form of coping with stressors. Per residence, they had a recent change in the staffing so patient has been more anxious, other than this has been at his baseline. At this time, patient does not present acutely depressed, manic, psychotic, or with any psych safety concern that meets criteria for psych admission. He likely would not benefit from inpatient psych and this would reinforce poor coping. Would continue current psych medications; there is low likelihood that any of the would contribute to an anticholinergic burden.

## 2025-06-20 NOTE — PATIENT PROFILE ADULT - FALL HARM RISK - HARM RISK INTERVENTIONS

## 2025-06-20 NOTE — BH CONSULTATION LIAISON ASSESSMENT NOTE - NSBHREFERDETAILS_PSY_A_CORE_FT
psych eval, to see if any could be contributing to anticholinergic effects psych eval, to see if any meds could be contributing to anticholinergic effects

## 2025-06-20 NOTE — CARE COORDINATION ASSESSMENT. - NSCAREPROVIDERS_GEN_ALL_CORE_FT
CARE PROVIDERS:  Accepting Physician: Bret Kent  Administration: Andreas Hodge  Administration: Geoffrey Weiss  Administration: Stephan Maloney  Admitting: Bret Kent  Attending: Ld Johnston  Consultant: Tigist Greer  Consultant: Manolo Falcon  Consultant: Claudia Dai  Covering Team: Jennifer Morocho  Covering Team: Andres Mandel  ED Attending: Andrews Vivar  ED Nurse: Kari Nicole  Nurse: Adriel Blankenship  Nurse: Sasha Marsh  Oncology: Tracey Johnston  Ordered: Doctor, Unknown  Outpatient Provider: Marco Paul  Override: Candida Back  PCA/Nursing Assistant: Ana March  Primary Team: Tabitha Dale  Primary Team: Ld Johnston  Primary Team: Sofia Nance  Primary Team: Burt Parra  Registered Dietitian: Nataliia Santos  : Roxy Chavez  Team: [facility], Team

## 2025-06-20 NOTE — PROVIDER CONTACT NOTE (OTHER) - ASSESSMENT
Pt calm and cooperative
BS 46, non tender, leg bag connected to bsd. pt c/o of mild discomfort in bladder area.  Pt refusing manipulation of catheter.

## 2025-06-20 NOTE — CHART NOTE - NSCHARTNOTEFT_GEN_A_CORE
called by RN that pt was received on the floor with a Sarabia without orders. Pt has a H/O urinary retention and urology has been consulted. Requested for an order for Sarabia and a bladder scan, Orders placed.

## 2025-06-20 NOTE — ED PROVIDER NOTE - TOBACCO USE
CPM/PAT Evaluation       Name: Yomaira Alanis (Yomaira Alanis)  /Age: 1956/68 y.o.     Visit Type:   In-Person       Chief Complaint: perioperative evaluation    HPI HPI: 67 y/o female scheduled for Laparoscopic removal of pelvic mass, bilateral salpingo-oophorecytomy, possible staging, any other indicated procedures(psb) - Bilateral  on 2025  secondary to Pelvic mass in female  with Dr. Roxie Lai  who referred to [facility].  Presents to [facility] today for perioperative risk stratification and optimization. PMHX includes Depression, HLD, Palpitations, Pelvic mass in female, GERD.      Specialists:   Gyn Onc- Roxie Lai MD, MS   Cards EP- Mehdi Barakat MD   Cardiology- Shiraz Patrick MD          Medical History[1]    Surgical History[2]    Patient  reports that she is not currently sexually active and has had partner(s) who are male. She reports using the following method of birth control/protection: Post-menopausal.    Family History[3]    Allergies[4]    Prior to Admission medications    Medication Sig Start Date End Date Taking? Authorizing Provider   busPIRone (Buspar) 5 mg tablet Take 1 tablet (5 mg) by mouth 2 times a day. 25  Historical Provider, MD   lansoprazole (Prevacid) 15 mg DR capsule Take 1 capsule (15 mg) by mouth once daily in the morning. Take before meals. Do not crush or chew.    Historical Provider, MD   metoprolol succinate XL (Toprol-XL) 25 mg 24 hr tablet Take 1 tablet (25 mg) by mouth once daily. Do not crush or chew. 25  Mehdi Barakat MD   metroNIDAZOLE (Metrocream) 0.75 % cream APPLY TO THE AFFECTED AREA OF face TWICE DAILY 25   Historical Provider, MD   multivitamin tablet Take 1 tablet by mouth once daily.    Historical Provider, MD   omega-3 acid ethyl esters (Lovaza) 1 gram capsule Take 1 capsule (1 g) by mouth once daily.    Historical Provider, MD   PARoxetine (Paxil) 20 mg tablet Take 1 tablet (20 mg) by mouth once daily in  Unknown if ever smoked the morning. Take before meals. 12/16/24   Historical Provider, MD   simvastatin (Zocor) 20 mg tablet Take 1 tablet (20 mg) by mouth once daily at bedtime.    Historical Provider, MD   vitamin D3-vitamin K2, MK4, (K2 Plus D3) 1,000-100 unit-mcg tablet Take 1,000 Units by mouth early in the morning..    Historical Provider, MD PHILIP ROS:   Constitutional:    no fever   no chills   no unexpected weight change  Neuro/Psych:    no numbness   no weakness   no light-headedness   no tremors   no confusion   not nervous/anxious   no self-injury   no suicidal ideation  Eyes:    no discharge   no vision loss   no diplopia   no visual disturbance   use of corrective lenses (readers)  Ears:    no ear pain   no hearing loss   no vertigo   no tinnitus   no hearing aides  Nose:    no nasal discharge   no sinus congestion   no epistaxis  Mouth:    no dental issues   no mouth pain   no oral bleeding   no mouth lesions  Throat:    no throat pain   no dysphagia   no voice change  Neck:    no neck pain   neck swelling   no neck stiffness   no neck masses  Cardio:    no chest pain   no palpitations   no peripheral edema   no dyspnea   no FUNG   no paroxysmal nocturnal dyspnea  Respiratory:    no cough   no wheezing   no hemoptysis   no shortness of breath  Endocrine:    no cold intolerance   no heat intolerance   no polydipsia  GI:    no abdominal distention   no abdominal pain   no constipation   no diarrhea   no nausea   no vomiting   no blood in stool  :    no difficulty urinating   no dysuria   no oliguria   no polyuria  Musculoskeletal:    no arthralgias   no myalgias   no decreased ROM   no swelling  Hematologic:    does not bruise/bleed easily   no excessive bleeding   no history of blood transfusion   no blood clots  Skin:   no skin changes   no sores/wound   no rash      Physical Exam  Vitals reviewed. Physical exam within normal limits.   Constitutional:       General: She is not in acute distress.     Appearance: Normal  "appearance. She is obese. She is not ill-appearing, toxic-appearing or diaphoretic.   HENT:      Head: Normocephalic.      Nose: Nose normal. No congestion or rhinorrhea.      Mouth/Throat:      Mouth: Mucous membranes are moist.      Pharynx: Oropharynx is clear. No oropharyngeal exudate or posterior oropharyngeal erythema.   Eyes:      General: No scleral icterus.        Right eye: No discharge.         Left eye: No discharge.      Conjunctiva/sclera: Conjunctivae normal.   Neck:      Vascular: No carotid bruit.   Cardiovascular:      Rate and Rhythm: Normal rate and regular rhythm.      Pulses: Normal pulses.      Heart sounds: Normal heart sounds. No murmur heard.     No friction rub. No gallop.   Pulmonary:      Effort: Pulmonary effort is normal. No respiratory distress.      Breath sounds: Normal breath sounds. No stridor. No wheezing, rhonchi or rales.   Chest:      Chest wall: No tenderness.   Musculoskeletal:         General: No swelling or tenderness. Normal range of motion.      Cervical back: Normal range of motion and neck supple. No rigidity or tenderness.   Skin:     General: Skin is warm and dry.   Neurological:      General: No focal deficit present.      Mental Status: She is alert.      Motor: No weakness.   Psychiatric:         Mood and Affect: Mood normal.         Behavior: Behavior normal.         Thought Content: Thought content normal.         Judgment: Judgment normal.          PAT AIRWAY:   Airway:     Mallampati::  II    TM distance::  >3 FB    Neck ROM::  Full   implants        Visit Vitals  /73   Pulse 54   Temp 36.8 °C (98.3 °F) (Temporal)   Ht 1.575 m (5' 2\")   Wt 86.2 kg (190 lb 1.6 oz)   SpO2 98%   BMI 34.77 kg/m²   OB Status Postmenopausal   Smoking Status Never   BSA 1.94 m²       DASI Risk Score      Flowsheet Row Pre-Admission Testing from 6/20/2025 in [facility] Questionnaire Series Submission from 6/4/2025 in [facility] OR with " Generic Provider Mychart   Can you take care of yourself (eat, dress, bathe, or use toilet)?  2.75 filed at 06/20/2025 1306 2.75  filed at 06/04/2025 1059   Can you walk indoors, such as around your house? 1.75 filed at 06/20/2025 1306 1.75  filed at 06/04/2025 1059   Can you walk a block or two on level ground?  2.75 filed at 06/20/2025 1306 2.75  filed at 06/04/2025 1059   Can you climb a flight of stairs or walk up a hill? 5.5 filed at 06/20/2025 1306 5.5  filed at 06/04/2025 1059   Can you run a short distance? 8 filed at 06/20/2025 1306 8  filed at 06/04/2025 1059   Can you do light work around the house like dusting or washing dishes? 2.7 filed at 06/20/2025 1306 2.7  filed at 06/04/2025 1059   Can you do moderate work around the house like vacuuming, sweeping floors or carrying groceries? 3.5 filed at 06/20/2025 1306 3.5  filed at 06/04/2025 1059   Can you do heavy work around the house like scrubbing floors or lifting and moving heavy furniture?  8 filed at 06/20/2025 1306 8  filed at 06/04/2025 1059   Can you do yard work like raking leaves, weeding or pushing a mower? 4.5 filed at 06/20/2025 1306 4.5  filed at 06/04/2025 1059   Can you have sexual relations? 5.25 filed at 06/20/2025 1306 5.25  filed at 06/04/2025 1059   Can you participate in moderate recreational activities like golf, bowling, dancing, doubles tennis or throwing a baseball or football? 6 filed at 06/20/2025 1306 6  filed at 06/04/2025 1059   Can you participate in strenous sports like swimming, singles tennis, football, basketball, or skiing? 7.5 filed at 06/20/2025 1306 0  filed at 06/04/2025 1059   DASI SCORE 58.2 filed at 06/20/2025 1306 50.7  filed at 06/04/2025 1059   METS Score (Will be calculated only when all the questions are answered) 9.9 filed at 06/20/2025 1306 9  filed at 06/04/2025 1059          Caprini DVT Assessment      Flowsheet Row Pre-Admission Testing from 6/20/2025 in [facility] ED to Hosp-Admission  (Discharged) from 3/17/2025 in [facility] Observation with Garrett Foster MD PhD and Ej Arana, DO   DVT Score (IF A SCORE IS NOT CALCULATING, MUST SELECT A BMI TO COMPLETE) 8 filed at 06/20/2025 1330 5 filed at 03/17/2025 2046   Surgical Factors Major surgery planned, lasting 2-3 hours filed at 06/20/2025 1330 --   BMI (BMI MUST BE CHOSEN) 31-40 (Obesity) filed at 06/20/2025 1330 31-40 (Obesity) filed at 03/17/2025 2046          Modified Frailty Index      Flowsheet Row Pre-Admission Testing from 6/20/2025 in [facility]   Non-independent functional status (problems with dressing, bathing, personal grooming, or cooking) 0 filed at 06/20/2025 1331   History of diabetes mellitus  0 filed at 06/20/2025 1331   History of COPD 0 filed at 06/20/2025 1331   History of CHF No filed at 06/20/2025 1331   History of MI 0 filed at 06/20/2025 1331   History of Percutaneous Coronary Intervention, Cardiac Surgery, or Angina No filed at 06/20/2025 1331   Hypertension requiring the use of medication  0 filed at 06/20/2025 1331   Peripheral vascular disease 0 filed at 06/20/2025 1331   Impaired sensorium (cognitive impairement or loss, clouding, or delirium) 0 filed at 06/20/2025 1331   TIA or CVA withouy residual deficit 0 filed at 06/20/2025 1331   Cerebrovascular accident with deficit 0 filed at 06/20/2025 1331   Modified Frailty Index Calculator 0 filed at 06/20/2025 1331          [id number] Stroke Risk Points  Current as of just now        N/A 0 to 9 Points:      Last Change: N/A          The [id number] risk score (Lip YESSY, et al. 2009. © 2010 [employer]) quantifies the risk of stroke for a patient with atrial fibrillation. For patients without atrial fibrillation or under the age of 18 this score appears as N/A. Higher score values generally indicate higher risk of stroke.        This score is not applicable to this patient. Components are not calculated.           Revised Cardiac Risk Index      Flowsheet Row Pre-Admission Testing from 6/20/2025 in [facility]   High-Risk Surgery (Intraperitoneal, Intrathoracic,Suprainguinal vascular) 0 filed at 06/20/2025 1328   History of ischemic heart disease (History of MI, History of positive exercuse test, Current chest paint considered due to myocardial ischemia, Use of nitrate therapy, ECG with pathological Q Waves) 0 filed at 06/20/2025 1328   History of congestive heart failure (pulmonary edemia, bilateral rales or S3 gallop, Paroxysmal nocturnal dyspnea, CXR showing pulmonary vascular redistribution) 0 filed at 06/20/2025 1328   History of cerebrovascular disease (Prior TIA or stroke) 0 filed at 06/20/2025 1328   Pre-operative insulin treatment 0 filed at 06/20/2025 1328   Pre-operative creatinine>2 mg/dl 0 filed at 06/20/2025 1328   Revised Cardiac Risk Calculator 0 filed at 06/20/2025 1328          Apfel Simplified Score      Flowsheet Row Pre-Admission Testing from 6/20/2025 in [facility]   Smoking status 1 filed at 06/20/2025 1331   History of motion sickness or PONV  0 filed at 06/20/2025 1331   Use of postoperative opioids 1 filed at 06/20/2025 1331   Gender - Female 1=Yes filed at 06/20/2025 1331   Apfel Simplified Score Calculator 3 filed at 06/20/2025 1331          Risk Analysis Index Results This Encounter    No data found in the last 10 encounters.       Stop Bang Score      Flowsheet Row Pre-Admission Testing from 6/20/2025 in [facility] Questionnaire Series Submission from 6/4/2025 in [facility] MOS OR with Generic Provider Mark   Do you snore loudly? 0 filed at 06/20/2025 1308 0  filed at 06/04/2025 1059   Do you often feel tired or fatigued after your sleep? 0 filed at 06/20/2025 1308 0  filed at 06/04/2025 1059   Has anyone ever observed you stop breathing in your sleep? 0 filed at 06/20/2025 1308 0  filed at 06/04/2025 1059   Do you  have or are you being treated for high blood pressure? 1 filed at 06/20/2025 1308 0  filed at 06/04/2025 1059   Recent BMI (Calculated) 34.8 filed at 06/20/2025 1308 34.4  filed at 06/04/2025 1059   Is BMI greater than 35 kg/m2? 0=No filed at 06/20/2025 1308 0=No  filed at 06/04/2025 1059   Age older than 50 years old? 1=Yes filed at 06/20/2025 1308 1=Yes  filed at 06/04/2025 1059   Is your neck circumference greater than 17 inches (Male) or 16 inches (Female)? 0 filed at 06/20/2025 1308 --   Gender - Male 0=No filed at 06/20/2025 1308 0=No  filed at 06/04/2025 1059   STOP-BANG Total Score 2 filed at 06/20/2025 1308 --          Prodigy: High Risk  Total Score: 8              Prodigy Age Score           ARISCAT Score for Postoperative Pulmonary Complications      Flowsheet Row Pre-Admission Testing from 6/20/2025 in [facility]   Age Calculated Score 3 filed at 06/20/2025 1329   Preoperative SpO2 0 filed at 06/20/2025 1329   Respiratory infection in the last month Either upper or lower (i.e., URI, bronchitis, pneumonia), with fever and antibiotic treatment 0 filed at 06/20/2025 1329   Preoperative anemia (Hgb less than 10 g/dl) 0 filed at 06/20/2025 1329   Surgical incision  0 filed at 06/20/2025 1329   Duration of surgery  16 filed at 06/20/2025 1329   Emergency Procedure  0 filed at 06/20/2025 1329   ARISCAT Total Score  19 filed at 06/20/2025 1329          Celina Perioperative Risk for Myocardial Infarction or Cardiac Arrest (JUDITH)    No data to display         Assessment and Plan:   Anesthesia/Airway:  No anesthesia complications        Neuro:    #Depression, -medicated with BuSpar (continue), Paxil (continue), and follows with primary care no further preoperative intervention.    Patient is at an increased risk for post operative delirium secondary to age >/= 65, depression, and type and duration of surgery.  Preoperative brain exercise educational handout provided to patient.    The patient is at  an increased risk for perioperative stroke secondary to increased age, HLD, female sex , and general anesthesia.       HEENT/Airway:    No HEENT diagnosis or significant findings on chart review or clinical presentation and evaluation. No further preoperative testing/intervention indicated at this time.      Cardiovascular:    #HLD, Palpitations, -  medicated with statin (continue) metoprolol succinate (continue) and follows with Cardiology. Patient was seen by EP for palpitations, noted likely stress related however was started on BB. BP today is 130/73 and denies cardiovascular symptoms. Physical exam is benign. METS is >4 and scheduled for non-cardiac surgery.  No additional preoperative testing is currently indicated.    METS are 9.9    RCRI  0 which is 3.9% 30 day risk of MACE (risk for cardiac death, nonfatal myocardial infarction, and nonfactal cardiac arrest    JUDITH score which indicates a   0.2 % risk of intraoperative or 30-day postoperative MACE    EKG 6/5/25           Holter Monitor 3/19-4/01/25      Echo Stress Test 3/18/25  Summary:   1. The resting ejection fraction was estimated at 55 to 60% with a peak exercise ejection fraction estimated at 60 to 65%.   2. Normal global left ventricular systolic function.   3. Adequate level of stress achieved.   4. The Tucker score is 3.   5. There were no stress-induced wall motion abnormalities. This is a negative stress echo test for ischemia.    Echo 3/18/25  CONCLUSIONS:   1. The left ventricular systolic function is normal, with a Kohler's biplane calculated ejection fraction of 63%.   2. Spectral Doppler shows a Grade I (impaired relaxation pattern) of left ventricular diastolic filling with normal left atrial filling pressure.   3. There is normal right ventricular global systolic function.   4. Mildly elevated right ventricular systolic pressure.   5. There is moderately increased posterior left ventricular wall thickness.         Pulmonary:    No  Pulmonary diagnosis or significant findings on chart review or clinical presentation and evaluation. Preoperative deep breathing educational handout provided to patient.    No pulmonary diagnosis, however patient is at increased risk of perioperative complications secondary to  age > 60, obesity, duration of surgery > 2 hours, types of anesthetic    ARISCAT:    19  points which is a low (1.6%) risk of in-hospital post-op pulmonary complications     PRODIGY:  8  points which is a intermediate risk of post op opioid induced respiratory depression episodes    STOP BAN   points which is a low risk for moderate to severe PRASHANTH    Pumonary toilet education discussed, patient also provided deep breathing exercises and incentive spirometry educational handout      Renal:   No renal diagnosis, however patient is at increase risk for perioperative renal complications secondary to  Age equal to or greater than 56, BMI equal to or greater than 30, use of an ace, arb, or NSAID       Endocrine:   No endocrine diagnosis or significant findings on chart review or clinical presentation and evaluation. No further testing or intervention is indicated at this time.      Hematologic/Oncology:      No hematologic diagnosis, however patient is at an increased risk for DVT    Patient confirmed they would accept blood products if necessary.   Preoperative DVT educational handout provided to patient.  Caprini Score:  8  points which is a highest risk of perioperative VTE      Gastrointestinal:   #Pelvic mass in female, GERD-seeking surgical intervention with [facility], patient denies any GI/ symptoms.  Okay to continue lansoprazole    EAT-10 score of    0  : self-perceived oropharyngeal dysphagia scale (0-40)     Apfel: 3 points 61% risk for post operative N/V      Infectious disease:     No infectious diagnosis or significant findings on chart review or clinical presentation and evaluation.       Musculoskeletal:    No diagnosis or  significant findings on chart review or clinical presentation and evaluation.       Other:     Hold Vit D supplementation day of surgery  Hold all other vitamins and supplements 7 days prior to surgery  Tylenol okay to continue, please hold Aleve/naproxen/ibuprofen/Excedrin (NSAIDs) for 7 days prior to surgery  No lotion/moisturizers or Deoderant after last shower prior to surgery        Labs ordered  cbc, basic, and t/s      Jennifer Spivey PA-C     Medication instructions and NPO guidelines reviewed with the patient.  All questions or concerns discussed and addressed.   The above clinical summary has been dictated with voice recognition software. It has not been proofread for grammatical errors, typographical mistakes, or other semantic inconsistencies.   All labs/imaging included on this documentation were reviewed/considered in medical decision making for perioperative planning, including labs ordered day of CPM appointment.           [1]   Past Medical History:  Diagnosis Date    Depression     HLD (hyperlipidemia)     Obesity    [2]   Past Surgical History:  Procedure Laterality Date    CARPAL TUNNEL RELEASE Bilateral    [3]   Family History  Problem Relation Name Age of Onset    Breast cancer Mother Lindsay Curry 88    Cancer Mother Lindsay Curry     Heart attack Mother Lindsay Curry     No Known Problems Father      Cancer Brother Michele Curry     Cancer Brother Michele Curry     Depression Maternal Grandmother Quynh Curry     Stroke Maternal Grandfather      Ovarian cancer Mother's Sister Leighton Kelly 80    Cancer Mother's Sister Leighton Kelly    [4] No Known Allergies     46.0 %    MCV 85 80 - 100 fL    MCH 26.5 26.0 - 34.0 pg    MCHC 31.2 (L) 32.0 - 36.0 g/dL    RDW 13.4 11.5 - 14.5 %    Platelets 215 150 - 450 x10*3/uL   Basic Metabolic Panel    Collection Time: 06/20/25  1:21 PM   Result Value Ref Range    Glucose 86 74 - 99 mg/dL    Sodium 141 136 - 145 mmol/L    Potassium 4.7 3.5 - 5.3 mmol/L    Chloride 104 98 - 107 mmol/L    Bicarbonate 28 21 - 32 mmol/L    Anion Gap 14 10 - 20 mmol/L    Urea Nitrogen 15 6 - 23 mg/dL    Creatinine 0.72 0.50 - 1.05 mg/dL    eGFR >90 >60 mL/min/1.73m*2    Calcium 9.7 8.6 - 10.6 mg/dL   Type And Screen Is this order related to pregnancy or an upcoming surgery? Yes; Where will this surgery/delivery be performed? [facility]; What is the date of the surgery? 7/7/2025; Has this patient ever had a transfusion? No; Has th...    Collection Time: 06/20/25  1:21 PM   Result Value Ref Range    ABO TYPE O     Rh TYPE POS     ANTIBODY SCREEN NEG            Jennifer Spivey PA-C     Medication instructions and NPO guidelines reviewed with the patient.  All questions or concerns discussed and addressed.   The above clinical summary has been dictated with voice recognition software. It has not been proofread for grammatical errors, typographical mistakes, or other semantic inconsistencies.   All labs/imaging included on this documentation were reviewed/considered in medical decision making for perioperative planning, including labs ordered day of CPM appointment.           [1]   Past Medical History:  Diagnosis Date    Depression     HLD (hyperlipidemia)     Obesity    [2]   Past Surgical History:  Procedure Laterality Date    CARPAL TUNNEL RELEASE Bilateral    [3]   Family History  Problem Relation Name Age of Onset    Breast cancer Mother Lindsay Curry 88    Cancer Mother Lindsay Curry     Heart attack Mother Lindsay Curry     No Known Problems Father      Cancer Brother Michele Curry     Cancer Brother Michele Curry     Depression Maternal Grandmother  Quynh Curry     Stroke Maternal Grandfather      Ovarian cancer Mother's Sister Leighton Kelly 80    Cancer Mother's Sister Leighton Kelly    [4] No Known Allergies

## 2025-06-20 NOTE — CONSULT NOTE ADULT - CONSULT REASON
72 year old female with chronic PAD with left LE tissue loss.  Patient is DNI, DNR, refusing any kind of surgical intervention if needed.    Recommendations  No acute surgical intervention needed at this time  If patient is to be discharged she can follow up as an outpatient  please obtain JUAN MANUEL/PVR  
Urinary retention

## 2025-06-20 NOTE — PROVIDER CONTACT NOTE (OTHER) - BACKGROUND
6/19 came to ER for retention, sent home with leg back, returned to ER after threatening to hurt himself if mckeon is removed.

## 2025-06-21 LAB
A1C WITH ESTIMATED AVERAGE GLUCOSE RESULT: 5.8 % — HIGH (ref 4–5.6)
ESTIMATED AVERAGE GLUCOSE: 120 MG/DL — HIGH (ref 68–114)

## 2025-06-21 PROCEDURE — 99232 SBSQ HOSP IP/OBS MODERATE 35: CPT

## 2025-06-21 RX ADMIN — Medication 325 MILLIGRAM(S): at 23:00

## 2025-06-21 RX ADMIN — TESTOSTERONE 100 MILLIGRAM(S): 5.5 GEL NASAL at 11:43

## 2025-06-21 RX ADMIN — ATORVASTATIN CALCIUM 10 MILLIGRAM(S): 80 TABLET, FILM COATED ORAL at 23:00

## 2025-06-21 RX ADMIN — METFORMIN HYDROCHLORIDE 1000 MILLIGRAM(S): 500 TABLET ORAL at 08:31

## 2025-06-21 RX ADMIN — METFORMIN HYDROCHLORIDE 1000 MILLIGRAM(S): 500 TABLET ORAL at 17:51

## 2025-06-21 RX ADMIN — Medication 10 MILLIGRAM(S): at 11:43

## 2025-06-21 RX ADMIN — Medication 0.3 MILLIGRAM(S): at 06:36

## 2025-06-21 RX ADMIN — Medication 4 MILLIGRAM(S): at 08:31

## 2025-06-21 RX ADMIN — Medication 0.3 MILLIGRAM(S): at 17:51

## 2025-06-21 RX ADMIN — Medication 20 MILLIGRAM(S): at 06:36

## 2025-06-21 RX ADMIN — Medication 500 MILLIGRAM(S): at 11:43

## 2025-06-21 RX ADMIN — Medication 75 MICROGRAM(S): at 06:36

## 2025-06-21 RX ADMIN — Medication 2 TABLET(S): at 23:00

## 2025-06-21 RX ADMIN — Medication 4 MILLIGRAM(S): at 20:00

## 2025-06-21 RX ADMIN — LITHIUM CARBONATE 300 MILLIGRAM(S): 600 CAPSULE, GELATIN COATED ORAL at 23:00

## 2025-06-21 RX ADMIN — Medication 1000 MILLIGRAM(S): at 23:00

## 2025-06-21 RX ADMIN — TAMSULOSIN HYDROCHLORIDE 0.8 MILLIGRAM(S): 0.4 CAPSULE ORAL at 23:00

## 2025-06-22 LAB
ANION GAP SERPL CALC-SCNC: 7 MMOL/L — SIGNIFICANT CHANGE UP (ref 5–17)
BUN SERPL-MCNC: 15 MG/DL — SIGNIFICANT CHANGE UP (ref 7–23)
CALCIUM SERPL-MCNC: 9.1 MG/DL — SIGNIFICANT CHANGE UP (ref 8.5–10.1)
CHLORIDE SERPL-SCNC: 104 MMOL/L — SIGNIFICANT CHANGE UP (ref 96–108)
CO2 SERPL-SCNC: 28 MMOL/L — SIGNIFICANT CHANGE UP (ref 22–31)
CREAT SERPL-MCNC: 1 MG/DL — SIGNIFICANT CHANGE UP (ref 0.5–1.3)
EGFR: 100 ML/MIN/1.73M2 — SIGNIFICANT CHANGE UP
EGFR: 100 ML/MIN/1.73M2 — SIGNIFICANT CHANGE UP
GLUCOSE SERPL-MCNC: 130 MG/DL — HIGH (ref 70–99)
POTASSIUM SERPL-MCNC: 4.7 MMOL/L — SIGNIFICANT CHANGE UP (ref 3.5–5.3)
POTASSIUM SERPL-SCNC: 4.7 MMOL/L — SIGNIFICANT CHANGE UP (ref 3.5–5.3)
SODIUM SERPL-SCNC: 139 MMOL/L — SIGNIFICANT CHANGE UP (ref 135–145)

## 2025-06-22 PROCEDURE — 99232 SBSQ HOSP IP/OBS MODERATE 35: CPT

## 2025-06-22 RX ORDER — MUPIROCIN CALCIUM 20 MG/G
1 CREAM TOPICAL
Refills: 0 | Status: DISCONTINUED | OUTPATIENT
Start: 2025-06-22 | End: 2025-06-23

## 2025-06-22 RX ADMIN — TESTOSTERONE 100 MILLIGRAM(S): 5.5 GEL NASAL at 12:24

## 2025-06-22 RX ADMIN — TAMSULOSIN HYDROCHLORIDE 0.8 MILLIGRAM(S): 0.4 CAPSULE ORAL at 22:13

## 2025-06-22 RX ADMIN — Medication 75 MICROGRAM(S): at 06:41

## 2025-06-22 RX ADMIN — METFORMIN HYDROCHLORIDE 1000 MILLIGRAM(S): 500 TABLET ORAL at 09:24

## 2025-06-22 RX ADMIN — Medication 20 MILLIGRAM(S): at 06:41

## 2025-06-22 RX ADMIN — Medication 10 MILLIGRAM(S): at 12:24

## 2025-06-22 RX ADMIN — Medication 2 TABLET(S): at 22:12

## 2025-06-22 RX ADMIN — Medication 0.3 MILLIGRAM(S): at 18:09

## 2025-06-22 RX ADMIN — Medication 500 MILLIGRAM(S): at 12:23

## 2025-06-22 RX ADMIN — Medication 4 MILLIGRAM(S): at 22:22

## 2025-06-22 RX ADMIN — Medication 0.3 MILLIGRAM(S): at 06:41

## 2025-06-22 RX ADMIN — LITHIUM CARBONATE 300 MILLIGRAM(S): 600 CAPSULE, GELATIN COATED ORAL at 22:12

## 2025-06-22 RX ADMIN — Medication 1000 MILLIGRAM(S): at 22:12

## 2025-06-22 RX ADMIN — Medication 325 MILLIGRAM(S): at 22:12

## 2025-06-22 RX ADMIN — Medication 3 MILLIGRAM(S): at 22:12

## 2025-06-22 RX ADMIN — MUPIROCIN CALCIUM 1 APPLICATION(S): 20 CREAM TOPICAL at 18:04

## 2025-06-22 RX ADMIN — Medication 4 MILLIGRAM(S): at 09:24

## 2025-06-22 RX ADMIN — ATORVASTATIN CALCIUM 10 MILLIGRAM(S): 80 TABLET, FILM COATED ORAL at 22:13

## 2025-06-22 RX ADMIN — METFORMIN HYDROCHLORIDE 1000 MILLIGRAM(S): 500 TABLET ORAL at 18:04

## 2025-06-22 NOTE — DISCHARGE NOTE PROVIDER - NSDCFUSCHEDAPPT_GEN_ALL_CORE_FT
Manju Hitchcock  [facility]  [facility]  Scheduled Appointment: 08/26/2025    Quoc Rodrigez  [facility]  [address]  Scheduled Appointment: 08/28/2025

## 2025-06-22 NOTE — SOCIAL WORK PROGRESS NOTE - NSSWPROGRESSNOTE_GEN_ALL_CORE
KEKE emailed Marry Ha ([email or web address]) from group home the dc papers for their review with an anticipated dc for tomorrow 6/23. SW to continue to follow and coordinate pt's dc needs.

## 2025-06-22 NOTE — DISCHARGE NOTE PROVIDER - HOSPITAL COURSE
ADMISSION DATE:  06-19-25    ---  FROM ADMISSION H+P:   HPI:  35-year-old white male recently evaluated for urinary retention and dispositioned back to group home with Mckeon catheter     Presents to ED 6/19/2025 stating he was going to hurt himself if he was sent back to the group home but then admitted to staff that the only reason why he made comments was to be admitted.     and have the Mckeon catheter left in place as he knew that by removing it which  what the group home wanted would be detrimental to him.   Patient denied any suicidal or homicidal ideations thereafter.     (19 Jun 2025 18:51)      ---  HOSPITAL COURSE/PERTINENT LABS/PROCEDURES PERFORMED/PENDING TESTS:   Pt was admitted for   Dipolar disorder, ADHD: had tele psy evaluation-> continue current medications, out patient psychiatry followup as routine. constant observation. clear for discharge.   urinary retention: mckeon placed in ED but removed and passed voiding trial. seen by urology       Patient is stable for discharge as per primary medical team and consultants.      Patient showed improvement throughout hospitalization. Patient was seen and examined on day of discharge. Patient was medically optimized for discharge with close outpatient follow up.    ---  PATIENT CONDITION:  - stable    --  VITALS:   T(C): 36.4 (06-22-25 @ 06:15), Max: 36.6 (06-21-25 @ 11:39)  HR: 65 (06-22-25 @ 06:15) (65 - 78)  BP: 92/54 (06-22-25 @ 06:30) (83/52 - 101/66)  RR: 16 (06-22-25 @ 06:15) (16 - 18)  SpO2: 92% (06-22-25 @ 06:15) (92% - 94%)    PHYSICAL EXAM ON DAY OF DISCHARGE:    ---  CONSULTANTS:   psychiatry   urology        ADMISSION DATE:  06-19-25    ---  FROM ADMISSION H+P:   HPI:  35-year-old white male recently evaluated for urinary retention and dispositioned back to group home with Mckeon catheter     Presents to ED 6/19/2025 stating he was going to hurt himself if he was sent back to the group home but then admitted to staff that the only reason why he made comments was to be admitted.     and have the Mckeon catheter left in place as he knew that by removing it which  what the group home wanted would be detrimental to him.   Patient denied any suicidal or homicidal ideations thereafter.     (19 Jun 2025 18:51)      ---  HOSPITAL COURSE/PERTINENT LABS/PROCEDURES PERFORMED/PENDING TESTS:   Pt was admitted for   Dipolar disorder, ADHD: had tele psy evaluation-> continue current medications, out patient psychiatry followup as routine. constant observation. clear for discharge.   urinary retention: mckeon placed in ED but removed and passed voiding trial. seen by urology       Patient is stable for discharge as per primary medical team and consultants.      Patient showed improvement throughout hospitalization. Patient was seen and examined on day of discharge. Patient was medically optimized for discharge with close outpatient follow up.    ---  PATIENT CONDITION:  - stable    --  VITALS:   T(C): 36.4 (06-22-25 @ 06:15), Max: 36.6 (06-21-25 @ 11:39)  HR: 65 (06-22-25 @ 06:15) (65 - 78)  BP: 92/54 (06-22-25 @ 06:30) (83/52 - 101/66)  RR: 16 (06-22-25 @ 06:15) (16 - 18)  SpO2: 92% (06-22-25 @ 06:15) (92% - 94%)    PHYSICAL EXAM ON DAY OF DISCHARGE:  GENERAL: NAD, lying in bed comfortably  HEAD:  Normocephalic  EYES:  conjunctiva and sclera clear  ENT: Moist mucous membranes  NECK: Supple  CHEST/LUNG: Clear to auscultation bilaterally; No rales or rhonchi; no wheezing. Unlabored respirations  HEART: Regular rate and rhythm; S1S2+  ABDOMEN: Bowel sounds present; Soft, Nontender, Nondistended.   EXTREMITIES:  + distal Peripheral Pulses;  No cyanosis, or edema  NERVOUS SYSTEM:  Alert & Oriented X3;  No gross focal deficits   MSK: moves all extremities  SKIN: No rashes   ---  CONSULTANTS:   psychiatry   urology

## 2025-06-22 NOTE — PROGRESS NOTE ADULT - SUBJECTIVE AND OBJECTIVE BOX
Patient is a 35y old  Male who presents with a chief complaint of alleged Sarabia catheter malfunction and suicidal ideation (20 Jun 2025 13:28)      Subjective:  INTERVAL HPI/OVERNIGHT EVENTS: Patient seen and examined at bedside.  Patient has no complaints at this time.     MEDICATIONS  (STANDING):  atorvastatin 10 milliGRAM(s) Oral at bedtime  cetirizine 10 milliGRAM(s) Oral daily  cloNIDine 0.3 milliGRAM(s) Oral two times a day  dextrose 5%. 1000 milliLiter(s) (50 mL/Hr) IV Continuous <Continuous>  dextrose 5%. 1000 milliLiter(s) (100 mL/Hr) IV Continuous <Continuous>  dextrose 50% Injectable 25 Gram(s) IV Push once  dextrose 50% Injectable 12.5 Gram(s) IV Push once  dextrose 50% Injectable 25 Gram(s) IV Push once  divalproex  milliGRAM(s) Oral daily  divalproex ER 1000 milliGRAM(s) Oral at bedtime  ferrous    sulfate 325 milliGRAM(s) Oral at bedtime  glucagon  Injectable 1 milliGRAM(s) IntraMuscular once  insulin lispro (ADMELOG) corrective regimen sliding scale   SubCutaneous three times a day before meals  insulin lispro (ADMELOG) corrective regimen sliding scale   SubCutaneous at bedtime  levothyroxine 75 MICROGram(s) Oral daily  lithium 300 milliGRAM(s) Oral at bedtime  metFORMIN 1000 milliGRAM(s) Oral two times a day with meals  pantoprazole    Tablet 20 milliGRAM(s) Oral before breakfast  risperiDONE   Tablet 4 milliGRAM(s) Oral every 12 hours  senna 2 Tablet(s) Oral at bedtime  tamsulosin 0.8 milliGRAM(s) Oral at bedtime  testosterone 1% Gel 100 milliGRAM(s) Topical daily    MEDICATIONS  (PRN):  acetaminophen     Tablet .. 650 milliGRAM(s) Oral every 6 hours PRN Temp greater or equal to 38C (100.4F), Mild Pain (1 - 3)  aluminum hydroxide/magnesium hydroxide/simethicone Suspension 30 milliLiter(s) Oral every 4 hours PRN Dyspepsia  dextrose Oral Gel 15 Gram(s) Oral once PRN Blood Glucose LESS THAN 70 milliGRAM(s)/deciliter  lidocaine 2% Jelly 5 milliLiter(s) Topical every 6 hours PRN severe pain  melatonin 3 milliGRAM(s) Oral at bedtime PRN Insomnia  ondansetron Injectable 4 milliGRAM(s) IV Push every 8 hours PRN Nausea and/or Vomiting      Allergies    Zyprexa (Unknown)  Bee stings (Unknown)  Zyprexa (Dystonic RXN)  Haldol (Rash)  Haldol (Other)  Celebrex (Short breath)    Intolerances        REVIEW OF SYSTEMS:  CONSTITUTIONAL: No fever or chills  HEENT:  No headache, no sore throat  RESPIRATORY: No cough or shortness of breath  CARDIOVASCULAR: No chest pain or palpitations  GASTROINTESTINAL: No abd pain, nausea, vomiting, or diarrhea      Objective:  Vital Signs Last 24 Hrs  T(C): 36.6 (21 Jun 2025 11:39), Max: 37 (20 Jun 2025 22:38)  T(F): 97.8 (21 Jun 2025 11:39), Max: 98.6 (20 Jun 2025 22:38)  HR: 70 (21 Jun 2025 11:39) (70 - 91)  BP: 101/66 (21 Jun 2025 11:39) (95/61 - 123/74)  BP(mean): --  RR: 18 (21 Jun 2025 11:39) (16 - 18)  SpO2: 92% (21 Jun 2025 11:39) (92% - 97%)    Parameters below as of 21 Jun 2025 11:39  Patient On (Oxygen Delivery Method): room air        GENERAL: NAD, lying in bed comfortably  HEAD:  Normocephalic  EYES:  conjunctiva and sclera clear  ENT: Moist mucous membranes  NECK: Supple  CHEST/LUNG: Clear to auscultation bilaterally; No rales or rhonchi; no wheezing. Unlabored respirations  HEART: Regular rate and rhythm; S1S2+  ABDOMEN: Bowel sounds present; Soft, Nontender, Nondistended.   EXTREMITIES:  + distal Peripheral Pulses;  No cyanosis, or edema  NERVOUS SYSTEM:  Alert & Oriented X3;  No gross focal deficits   psy: denies SI/HI   MSK: moves all extremities  SKIN: No rashes     LABS:      Ca    8.7        20 Jun 2025 07:15        Urinalysis Basic - ( 20 Jun 2025 07:15 )    Color: x / Appearance: x / SG: x / pH: x  Gluc: 141 mg/dL / Ketone: x  / Bili: x / Urobili: x   Blood: x / Protein: x / Nitrite: x   Leuk Esterase: x / RBC: x / WBC x   Sq Epi: x / Non Sq Epi: x / Bacteria: x      CAPILLARY BLOOD GLUCOSE      POCT Blood Glucose.: 100 mg/dL (21 Jun 2025 12:18)  POCT Blood Glucose.: 115 mg/dL (21 Jun 2025 08:07)  POCT Blood Glucose.: 106 mg/dL (20 Jun 2025 22:07)  POCT Blood Glucose.: 91 mg/dL (20 Jun 2025 17:19)        Urinalysis with Rflx Culture (collected 06-19-25 @ 12:10)        RADIOLOGY & ADDITIONAL TESTS:    Personally reviewed.     Consultant(s) Notes Reviewed:  [x] YES  [ ] NO    Plan of care discussed with patient ; all questions answered  
Patient is a 35y old  Male who presents with a chief complaint of alleged Sarabia catheter malfunction and suicidal ideation (22 Jun 2025 11:29)      Subjective:  INTERVAL HPI/OVERNIGHT EVENTS: Patient seen and examined at bedside.  Patient has no complaints at this time.     MEDICATIONS  (STANDING):  atorvastatin 10 milliGRAM(s) Oral at bedtime  cetirizine 10 milliGRAM(s) Oral daily  cloNIDine 0.3 milliGRAM(s) Oral two times a day  dextrose 5%. 1000 milliLiter(s) (50 mL/Hr) IV Continuous <Continuous>  dextrose 5%. 1000 milliLiter(s) (100 mL/Hr) IV Continuous <Continuous>  dextrose 50% Injectable 25 Gram(s) IV Push once  dextrose 50% Injectable 12.5 Gram(s) IV Push once  dextrose 50% Injectable 25 Gram(s) IV Push once  divalproex  milliGRAM(s) Oral daily  divalproex ER 1000 milliGRAM(s) Oral at bedtime  ferrous    sulfate 325 milliGRAM(s) Oral at bedtime  glucagon  Injectable 1 milliGRAM(s) IntraMuscular once  insulin lispro (ADMELOG) corrective regimen sliding scale   SubCutaneous three times a day before meals  insulin lispro (ADMELOG) corrective regimen sliding scale   SubCutaneous at bedtime  levothyroxine 75 MICROGram(s) Oral daily  lithium 300 milliGRAM(s) Oral at bedtime  metFORMIN 1000 milliGRAM(s) Oral two times a day with meals  pantoprazole    Tablet 20 milliGRAM(s) Oral before breakfast  risperiDONE   Tablet 4 milliGRAM(s) Oral every 12 hours  senna 2 Tablet(s) Oral at bedtime  tamsulosin 0.8 milliGRAM(s) Oral at bedtime  testosterone 1% Gel 100 milliGRAM(s) Topical daily    MEDICATIONS  (PRN):  acetaminophen     Tablet .. 650 milliGRAM(s) Oral every 6 hours PRN Temp greater or equal to 38C (100.4F), Mild Pain (1 - 3)  aluminum hydroxide/magnesium hydroxide/simethicone Suspension 30 milliLiter(s) Oral every 4 hours PRN Dyspepsia  dextrose Oral Gel 15 Gram(s) Oral once PRN Blood Glucose LESS THAN 70 milliGRAM(s)/deciliter  lidocaine 2% Jelly 5 milliLiter(s) Topical every 6 hours PRN severe pain  melatonin 3 milliGRAM(s) Oral at bedtime PRN Insomnia  ondansetron Injectable 4 milliGRAM(s) IV Push every 8 hours PRN Nausea and/or Vomiting      Allergies    Zyprexa (Unknown)  Bee stings (Unknown)  Zyprexa (Dystonic RXN)  Haldol (Rash)  Haldol (Other)  Celebrex (Short breath)    Intolerances        REVIEW OF SYSTEMS:  CONSTITUTIONAL: No fever or chills  HEENT:  No headache, no sore throat  RESPIRATORY: No cough or shortness of breath  CARDIOVASCULAR: No chest pain or palpitations  GASTROINTESTINAL: No abd pain, nausea, vomiting, or diarrhea      Objective:  Vital Signs Last 24 Hrs  T(C): 36.3 (22 Jun 2025 12:20), Max: 36.4 (21 Jun 2025 20:16)  T(F): 97.4 (22 Jun 2025 12:20), Max: 97.6 (21 Jun 2025 20:16)  HR: 79 (22 Jun 2025 12:20) (65 - 79)  BP: 113/80 (22 Jun 2025 12:20) (83/52 - 113/80)  BP(mean): --  RR: 18 (22 Jun 2025 12:20) (16 - 18)  SpO2: 97% (22 Jun 2025 12:20) (92% - 97%)    Parameters below as of 22 Jun 2025 12:20  Patient On (Oxygen Delivery Method): room air        GENERAL: NAD, lying in bed comfortably  HEAD:  Normocephalic  EYES:  conjunctiva and sclera clear  ENT: Moist mucous membranes  NECK: Supple  CHEST/LUNG: Clear to auscultation bilaterally; No rales or rhonchi; no wheezing. Unlabored respirations  HEART: Regular rate and rhythm; S1S2+  ABDOMEN: Bowel sounds present; Soft, Nontender, Nondistended.   EXTREMITIES:  + distal Peripheral Pulses;  No cyanosis, or edema  NERVOUS SYSTEM:  Alert & Oriented X3;  No gross focal deficits   MSK: moves all extremities  SKIN: No rashes     LABS:              CAPILLARY BLOOD GLUCOSE      POCT Blood Glucose.: 93 mg/dL (22 Jun 2025 12:16)  POCT Blood Glucose.: 114 mg/dL (22 Jun 2025 08:28)  POCT Blood Glucose.: 111 mg/dL (21 Jun 2025 21:41)  POCT Blood Glucose.: 114 mg/dL (21 Jun 2025 17:26)        Urinalysis with Rflx Culture (collected 06-19-25 @ 12:10)        RADIOLOGY & ADDITIONAL TESTS:    Personally reviewed.     Consultant(s) Notes Reviewed:  [x] YES  [ ] NO    Plan of care discussed with patient ; all questions answered  
Patient is a 35y old  Male who presents with a chief complaint of alleged Sarabia catheter malfunction and suicidal ideation (19 Jun 2025 18:51)      Subjective:  INTERVAL HPI/OVERNIGHT EVENTS: Patient seen and examined at bedside.  Patient has no complaints at this time.     MEDICATIONS  (STANDING):  atorvastatin 10 milliGRAM(s) Oral at bedtime  cetirizine 10 milliGRAM(s) Oral daily  cloNIDine 0.3 milliGRAM(s) Oral two times a day  dextrose 5%. 1000 milliLiter(s) (50 mL/Hr) IV Continuous <Continuous>  dextrose 5%. 1000 milliLiter(s) (100 mL/Hr) IV Continuous <Continuous>  dextrose 50% Injectable 25 Gram(s) IV Push once  dextrose 50% Injectable 12.5 Gram(s) IV Push once  dextrose 50% Injectable 25 Gram(s) IV Push once  divalproex ER 1000 milliGRAM(s) Oral at bedtime  ferrous    sulfate 325 milliGRAM(s) Oral at bedtime  glucagon  Injectable 1 milliGRAM(s) IntraMuscular once  insulin lispro (ADMELOG) corrective regimen sliding scale   SubCutaneous three times a day before meals  insulin lispro (ADMELOG) corrective regimen sliding scale   SubCutaneous at bedtime  levothyroxine 75 MICROGram(s) Oral daily  lithium 300 milliGRAM(s) Oral at bedtime  metFORMIN 1000 milliGRAM(s) Oral two times a day with meals  pantoprazole    Tablet 20 milliGRAM(s) Oral before breakfast  risperiDONE   Tablet 4 milliGRAM(s) Oral every 12 hours  senna 2 Tablet(s) Oral at bedtime  tamsulosin 0.4 milliGRAM(s) Oral at bedtime  testosterone 1% Gel 100 milliGRAM(s) Topical daily    MEDICATIONS  (PRN):  acetaminophen     Tablet .. 650 milliGRAM(s) Oral every 6 hours PRN Temp greater or equal to 38C (100.4F), Mild Pain (1 - 3)  aluminum hydroxide/magnesium hydroxide/simethicone Suspension 30 milliLiter(s) Oral every 4 hours PRN Dyspepsia  dextrose Oral Gel 15 Gram(s) Oral once PRN Blood Glucose LESS THAN 70 milliGRAM(s)/deciliter  lidocaine 2% Jelly 5 milliLiter(s) Topical every 6 hours PRN severe pain  melatonin 3 milliGRAM(s) Oral at bedtime PRN Insomnia  ondansetron Injectable 4 milliGRAM(s) IV Push every 8 hours PRN Nausea and/or Vomiting      Allergies    Zyprexa (Unknown)  Bee stings (Unknown)  Zyprexa (Dystonic RXN)  Haldol (Rash)  Haldol (Other)  Celebrex (Short breath)    Intolerances        REVIEW OF SYSTEMS:  CONSTITUTIONAL: No fever or chills  HEENT:  No headache, no sore throat  RESPIRATORY: No cough or shortness of breath  CARDIOVASCULAR: No chest pain or palpitations  GASTROINTESTINAL: No abd pain, nausea, vomiting, or diarrhea      Objective:  Vital Signs Last 24 Hrs  T(C): 36.3 (20 Jun 2025 11:10), Max: 36.8 (19 Jun 2025 21:03)  T(F): 97.4 (20 Jun 2025 11:10), Max: 98.3 (19 Jun 2025 21:03)  HR: 72 (20 Jun 2025 11:10) (72 - 112)  BP: 101/71 (20 Jun 2025 11:10) (101/71 - 150/97)  BP(mean): 105 (19 Jun 2025 21:03) (105 - 105)  RR: 18 (20 Jun 2025 11:10) (18 - 18)  SpO2: 95% (20 Jun 2025 11:10) (92% - 100%)    Parameters below as of 20 Jun 2025 11:10  Patient On (Oxygen Delivery Method): room air        GENERAL: NAD, lying in bed comfortably  HEAD:  Normocephalic  EYES:  conjunctiva and sclera clear  ENT: Moist mucous membranes  NECK: Supple  CHEST/LUNG: Clear to auscultation bilaterally; No rales or rhonchi; no wheezing. Unlabored respirations  HEART: Regular rate and rhythm; S1S2+  ABDOMEN: Bowel sounds present; Soft, Nontender, Nondistended.   EXTREMITIES:  + distal Peripheral Pulses;  No cyanosis, or edema  NERVOUS SYSTEM:  Alert & Oriented X3;  No gross focal deficits   MSK: moves all extremities  Psy : express feeling depressed and hopeless, he said he tried to cut himself with knife on the left wrist and top of fore head: when there is a line of scar left wrist and a round scab on the scalp.   SKIN: No rashes     LABS:                        14.0   8.28  )-----------( 217      ( 20 Jun 2025 07:15 )             42.8     20 Jun 2025 07:15    139    |  107    |  14     ----------------------------<  141    4.8     |  22     |  0.82     Ca    8.7        20 Jun 2025 07:15        Urinalysis Basic - ( 20 Jun 2025 07:15 )    Color: x / Appearance: x / SG: x / pH: x  Gluc: 141 mg/dL / Ketone: x  / Bili: x / Urobili: x   Blood: x / Protein: x / Nitrite: x   Leuk Esterase: x / RBC: x / WBC x   Sq Epi: x / Non Sq Epi: x / Bacteria: x      CAPILLARY BLOOD GLUCOSE      POCT Blood Glucose.: 98 mg/dL (20 Jun 2025 12:10)        Urinalysis with Rflx Culture (collected 06-19-25 @ 12:10)        RADIOLOGY & ADDITIONAL TESTS:    Personally reviewed.     Consultant(s) Notes Reviewed:  [x] YES  [ ] NO    Plan of care discussed with patient ; all questions answered

## 2025-06-22 NOTE — PROGRESS NOTE ADULT - ASSESSMENT
35-year-old white male   previously evaluated for urinary retention and disposition back to group home with Mckeon catheter     Intellectual disability, Impulse control disorder, Factitious disorder, Autism  Obesity  Asthma  GERD (gastroesophageal reflux disease)  History of BPH, Urinary retention,   Myopia of both eyes  Hypothyroid  HLD (hyperlipidemia)  Type 2 diabetes mellitus  H/o testicular cancer with History of orchiectomy    Presents to ED 6/19/2025 stating he was going to hurt himself if he was sent back to the group home but then admitted to staff that the only reason why he made comments was to be admitted.    Admit observation overnight, with an psychological evaluation to disposition back to group home.   Tele psy called for concern of SI  , will see patient   -resume home psy medications, medication verified with group home staff on the phone, list in chart.     urinary retention: mckeon inserted in ED, c/w flomax ,urology Eval , Urinalysis negative, CBC/CMP LEVEL STABLE                   
35-year-old white male   previously evaluated for urinary retention and disposition back to group home with Mckeon catheter     Intellectual disability, Impulse control disorder, Factitious disorder, Autism  Obesity  Asthma  GERD (gastroesophageal reflux disease)  History of BPH, Urinary retention,   Myopia of both eyes  Hypothyroid  HLD (hyperlipidemia)  Type 2 diabetes mellitus  H/o testicular cancer with History of orchiectomy    Presents to ED 6/19/2025 stating he was going to hurt himself if he was sent back to the group home but then admitted to staff that the only reason why he made comments was to be admitted.    Admit observation overnight, with an psychological evaluation to disposition back to group home.   Tele psy eval noted, clear for discharge and out patient routine psy follow up, constant observation.   -continue  home psy medications, medication verified with group home staff on the phone, list in chart.     urinary retention: mckeon inserted in ED, c/w flomax ,urology Eval noted  , Urinalysis negative, CBC/CMP LEVEL STABLE , mckeon removed 6/20, patient able to void                   
35-year-old white male   previously evaluated for urinary retention and disposition back to group home with Mckeon catheter     Intellectual disability, Impulse control disorder, Factitious disorder, Autism  Obesity  Asthma  GERD (gastroesophageal reflux disease)  History of BPH, Urinary retention,   Myopia of both eyes  Hypothyroid  HLD (hyperlipidemia)  Type 2 diabetes mellitus  H/o testicular cancer with History of orchiectomy    Presents to ED 6/19/2025 stating he was going to hurt himself if he was sent back to the group home but then admitted to staff that the only reason why he made comments was to be admitted.    Admit observation overnight, with an psychological evaluation to disposition back to group home.   Tele psy eval noted, clear for discharge and out patient routine psy follow up, constant observation.   -continue  home psy medications, medication verified with group home staff on the phone, list in chart.     urinary retention: mckeon inserted in ED, c/w flomax ,urology Eval noted  , Urinalysis negative, CBC/CMP LEVEL STABLE , mckeon removed 6/20, patient able to void

## 2025-06-22 NOTE — DISCHARGE NOTE PROVIDER - NSDCMRMEDTOKEN_GEN_ALL_CORE_FT
atorvastatin 10 mg oral tablet: 1 tab(s) orally once a day (in the evening)  cloNIDine 0.1 mg oral tablet: 3 tab(s) orally 2 times a day  divalproex sodium 500 mg oral tablet, extended release: 2 tab(s) orally once a day (at bedtime)  divalproex sodium 500 mg oral tablet, extended release: 1 tab(s) orally once a day  ferrous sulfate 325 mg (65 mg elemental iron) oral tablet: 1 tab(s) orally once a day (at bedtime)  levothyroxine 50 mcg (0.05 mg) oral tablet: 1.5 tab(s) orally once a day  lithium: 300 milligram(s) orally once a day (at bedtime)  loratadine 10 mg oral tablet: 1 tab(s) orally once a day  metFORMIN 1000 mg oral tablet: 1 tab(s) orally 2 times a day (with meals)  Mounjaro 5 mg/0.5 mL subcutaneous solution: 5 milligram(s) subcutaneously once a week Mondays  Protonix 20 mg oral delayed release tablet: 1 tab(s) orally once a day  risperiDONE 4 mg oral tablet: 1 tab(s) orally 2 times a day at 8am &amp; 8pm  senna (sennosides) 8.6 mg oral tablet: 2 tab(s) orally once a day (at bedtime)  tamsulosin 0.4 mg oral capsule: 2 cap(s) orally once a day (at bedtime)  testosterone 50 mg/5 g (1%) transdermal gel: 2 packet(s) transdermally once a day rotate each patch to shoulder and arms   atorvastatin 10 mg oral tablet: 1 tab(s) orally once a day (in the evening)  cloNIDine 0.1 mg oral tablet: 3 tab(s) orally 2 times a day  divalproex sodium 500 mg oral tablet, extended release: 2 tab(s) orally once a day (at bedtime)  divalproex sodium 500 mg oral tablet, extended release: 1 tab(s) orally once a day  ferrous sulfate 325 mg (65 mg elemental iron) oral tablet: 1 tab(s) orally once a day (at bedtime)  levothyroxine 50 mcg (0.05 mg) oral tablet: 1.5 tab(s) orally once a day  lithium: 300 milligram(s) orally once a day (at bedtime)  loratadine 10 mg oral tablet: 1 tab(s) orally once a day  metFORMIN 1000 mg oral tablet: 1 tab(s) orally 2 times a day (with meals)  Mounjaro 5 mg/0.5 mL subcutaneous solution: 5 milligram(s) subcutaneously once a week Mondays  mupirocin 2% topical ointment: Apply topically to affected area 2 times a day 1 Apply topically to affected area 2 times a day  Protonix 20 mg oral delayed release tablet: 1 tab(s) orally once a day  risperiDONE 4 mg oral tablet: 1 tab(s) orally 2 times a day at 8am &amp; 8pm  senna (sennosides) 8.6 mg oral tablet: 2 tab(s) orally once a day (at bedtime)  tamsulosin 0.4 mg oral capsule: 2 cap(s) orally once a day (at bedtime)  testosterone 50 mg/5 g (1%) transdermal gel: 2 packet(s) transdermally once a day rotate each patch to shoulder and arms

## 2025-06-22 NOTE — DISCHARGE NOTE PROVIDER - CARE PROVIDER_API CALL
your primary care doctor,   Phone: (   )    -  Fax: (   )    -  Follow Up Time: 1 week    your psychiatrist,   Phone: (   )    -  Fax: (   )    -  Follow Up Time: 2 weeks

## 2025-06-22 NOTE — DISCHARGE NOTE PROVIDER - PROVIDER TOKENS
FREE:[LAST:[your primary care doctor],PHONE:[(   )    -],FAX:[(   )    -],FOLLOWUP:[1 week]],FREE:[LAST:[your psychiatrist],PHONE:[(   )    -],FAX:[(   )    -],FOLLOWUP:[2 weeks]]

## 2025-06-22 NOTE — PROGRESS NOTE ADULT - REASON FOR ADMISSION
alleged Sarabia catheter malfunction and suicidal ideation

## 2025-06-22 NOTE — DISCHARGE NOTE PROVIDER - NSDCCPCAREPLAN_GEN_ALL_CORE_FT
PRINCIPAL DISCHARGE DIAGNOSIS  Diagnosis: Urinary retention  Assessment and Plan of Treatment: you were seen by urology and mckeon was removed, passed voiding trial, continue flomax      SECONDARY DISCHARGE DIAGNOSES  Diagnosis: Bipolar disorder  Assessment and Plan of Treatment: you were seen by Tele Psy in the hospital, continue home regimen     PRINCIPAL DISCHARGE DIAGNOSIS  Diagnosis: Urinary retention  Assessment and Plan of Treatment: you were seen by urology and mckeon was removed, passed voiding trial, continue flomax      SECONDARY DISCHARGE DIAGNOSES  Diagnosis: Bipolar disorder  Assessment and Plan of Treatment: you were seen by Tele Psy in the hospital, continue home regimen    Diagnosis: Cellulitis  Assessment and Plan of Treatment: bactroban topical as ordered.

## 2025-06-23 ENCOUNTER — TRANSCRIPTION ENCOUNTER (OUTPATIENT)
Age: 35
End: 2025-06-23

## 2025-06-23 VITALS
DIASTOLIC BLOOD PRESSURE: 52 MMHG | SYSTOLIC BLOOD PRESSURE: 96 MMHG | OXYGEN SATURATION: 95 % | TEMPERATURE: 98 F | RESPIRATION RATE: 18 BRPM | HEART RATE: 74 BPM

## 2025-06-23 PROCEDURE — 82962 GLUCOSE BLOOD TEST: CPT

## 2025-06-23 PROCEDURE — 81003 URINALYSIS AUTO W/O SCOPE: CPT

## 2025-06-23 PROCEDURE — 85025 COMPLETE CBC W/AUTO DIFF WBC: CPT

## 2025-06-23 PROCEDURE — 99283 EMERGENCY DEPT VISIT LOW MDM: CPT | Mod: 25

## 2025-06-23 PROCEDURE — 51702 INSERT TEMP BLADDER CATH: CPT

## 2025-06-23 PROCEDURE — 84443 ASSAY THYROID STIM HORMONE: CPT

## 2025-06-23 PROCEDURE — 51798 US URINE CAPACITY MEASURE: CPT

## 2025-06-23 PROCEDURE — 99239 HOSP IP/OBS DSCHRG MGMT >30: CPT

## 2025-06-23 PROCEDURE — 80048 BASIC METABOLIC PNL TOTAL CA: CPT

## 2025-06-23 PROCEDURE — 36415 COLL VENOUS BLD VENIPUNCTURE: CPT

## 2025-06-23 PROCEDURE — 99285 EMERGENCY DEPT VISIT HI MDM: CPT

## 2025-06-23 PROCEDURE — 83036 HEMOGLOBIN GLYCOSYLATED A1C: CPT

## 2025-06-23 RX ORDER — MUPIROCIN CALCIUM 20 MG/G
1 CREAM TOPICAL
Qty: 1 | Refills: 0
Start: 2025-06-23 | End: 2025-06-29

## 2025-06-23 RX ADMIN — Medication 20 MILLIGRAM(S): at 06:21

## 2025-06-23 RX ADMIN — Medication 75 MICROGRAM(S): at 06:21

## 2025-06-23 RX ADMIN — MUPIROCIN CALCIUM 1 APPLICATION(S): 20 CREAM TOPICAL at 06:24

## 2025-06-23 RX ADMIN — Medication 4 MILLIGRAM(S): at 08:29

## 2025-06-23 RX ADMIN — INSULIN LISPRO 1: 100 INJECTION, SOLUTION INTRAVENOUS; SUBCUTANEOUS at 08:29

## 2025-06-23 RX ADMIN — METFORMIN HYDROCHLORIDE 1000 MILLIGRAM(S): 500 TABLET ORAL at 08:30

## 2025-06-23 NOTE — DISCHARGE NOTE NURSING/CASE MANAGEMENT/SOCIAL WORK - NSDCVIVACCINE_GEN_ALL_CORE_FT
Tdap; 20-Dec-2018 12:21; Lo Anaya (RN); [employer]; [id number] (Exp. Date: 02-Nov-2020); IntraMuscular; Deltoid Left.; 0.5 milliLiter(s); VIS (VIS Published: 09-May-2013, VIS Presented: 20-Dec-2018);   Tdap; 26-Mar-2019 18:59; Shavon Barrios (RN); [employer]; [id number] (Exp. Date: 26-Feb-2021); IntraMuscular; Deltoid Left.; 0.5 milliLiter(s); VIS (VIS Published: 09-May-2013, VIS Presented: 26-Mar-2019);   Tdap; 01-Dec-2021 09:35; Zamzam Coulter (RN); [employer]; [id number] (Exp. Date: 09-Sep-2023); IntraMuscular; Deltoid Left.; 0.5 milliLiter(s); VIS (VIS Published: 09-May-2013, VIS Presented: 01-Dec-2021);   Tdap; 21-Jul-2022 01:28; Rose Hancock (RN); [employer]; [id number] (Exp. Date: 14-Oct-2024); IntraMuscular; Deltoid Right.; 0.5 milliLiter(s); VIS (VIS Published: 09-May-2013, VIS Presented: 21-Jul-2022);   Tdap; 12-Jun-2023 21:01; Diann Paul (RN); [employer]; [id number] (Exp. Date: 08-Mar-2025); IntraMuscular; Deltoid Left.; 0.5 milliLiter(s); VIS (VIS Published: 09-May-2013, VIS Presented: 12-Jun-2023);   Tdap; 24-Jul-2023 23:01; Diann Paul (RN); [employer]; [id number] (Exp. Date: 18-Aug-2025); IntraMuscular; Deltoid Right.; 0.5 milliLiter(s); VIS (VIS Published: 09-May-2013, VIS Presented: 24-Jul-2023);   Tdap; 09-Jun-2024 22:20; Dyllan Arevalo (RN); [employer]; [id number] (Exp. Date: 21-Nov-2024); IntraMuscular; Deltoid Right.; 0.5 milliLiter(s); VIS (VIS Published: 09-May-2013, VIS Presented: 09-Jun-2024);   Tdap; 24-Sep-2024 18:28; Luba Crocker (RN); [employer]; [id number] (Exp. Date: 31-May-2026); IntraMuscular; Deltoid Left.; 0.5 milliLiter(s); VIS (VIS Published: 09-May-2013, VIS Presented: 24-Sep-2024);

## 2025-06-23 NOTE — SOCIAL WORK PROGRESS NOTE - NSSWPROGRESSNOTE_GEN_ALL_CORE
KEKE spoke with pts  Luis - they will  the patient this afternoon when they have staff to send. KEKE remains available.

## 2025-06-23 NOTE — DISCHARGE NOTE NURSING/CASE MANAGEMENT/SOCIAL WORK - PATIENT PORTAL LINK FT
You can access the FollowMyHealth Patient Portal offered by Health system by registering at the following website: [email or web address]. By joining [employer]’s FollowMyHealth portal, you will also be able to view your health information using other applications (apps) compatible with our system.

## 2025-06-26 ENCOUNTER — EMERGENCY (EMERGENCY)
Facility: HOSPITAL | Age: 35
LOS: 1 days | End: 2025-06-26
Attending: EMERGENCY MEDICINE | Admitting: EMERGENCY MEDICINE
Payer: MEDICAID

## 2025-06-26 VITALS
WEIGHT: 205.03 LBS | HEIGHT: 71 IN | SYSTOLIC BLOOD PRESSURE: 128 MMHG | HEART RATE: 98 BPM | RESPIRATION RATE: 19 BRPM | OXYGEN SATURATION: 99 % | TEMPERATURE: 98 F | DIASTOLIC BLOOD PRESSURE: 82 MMHG

## 2025-06-26 DIAGNOSIS — Z90.79 ACQUIRED ABSENCE OF OTHER GENITAL ORGAN(S): Chronic | ICD-10-CM

## 2025-06-26 PROCEDURE — 99282 EMERGENCY DEPT VISIT SF MDM: CPT

## 2025-06-26 PROCEDURE — 99283 EMERGENCY DEPT VISIT LOW MDM: CPT

## 2025-06-26 NOTE — ED PROVIDER NOTE - CLINICAL SUMMARY MEDICAL DECISION MAKING FREE TEXT BOX
35-year-old male with inability to urinate, patient had recent admission for the same, likely attention seeking from reviewing admission notes, will wait for patient to urinate in the ER and then discharged home.

## 2025-06-26 NOTE — ED ADULT NURSE NOTE - CAS TRG GENERAL AIRWAY, MLM
Rosella Crigler (: 1956) is a 71 y.o. female, established patient, here for evaluation of the following chief complaint(s):  Labs Only       ASSESSMENT/PLAN:  Below is the assessment and plan developed based on review of pertinent history, physical exam, labs, studies, and medications. 1. Low hemoglobin  -     CBC WITH AUTOMATED DIFF; Future  -     AMB POC HEMOGLOBIN (HGB)    An electronic signature was used to authenticate this note.   -- Rashid Mejia NP Patent

## 2025-06-26 NOTE — ED ADULT NURSE NOTE - CHPI ED NUR SEVERITY2
Render Risk Assessment In Note?: no Detail Level: Simple Additional Notes: Pt will come back in 6 months for a chemical peel PAIN SCALE 10 OF 10.

## 2025-06-26 NOTE — ED PROVIDER NOTE - NSFOLLOWUPINSTRUCTIONS_ED_ALL_ED_FT
Follow-up with your primary care doctor as needed.  Drink plenty of fluids and stay well-hydrated.  Return to the emergency department if having fever greater than 100.3 °F and or worsening of symptoms.

## 2025-06-26 NOTE — ED PROVIDER NOTE - CARE PROVIDERS DIRECT ADDRESSES
154.3
[email or web address]

## 2025-06-26 NOTE — ED PROVIDER NOTE - PATIENT PORTAL LINK FT
You can access the FollowMyHealth Patient Portal offered by [facility] by registering at the following website: [email or web address]. By joining [employer]’s FollowMyHealth portal, you will also be able to view your health information using other applications (apps) compatible with our system.

## 2025-06-26 NOTE — ED PROVIDER NOTE - OBJECTIVE STATEMENT
35-year-old male presents to the emergency department states that he has not been able to urinate since 2 PM today, patient had recent admission on 6/19 through 6/23/2025 for urinary retention.

## 2025-06-26 NOTE — ED ADULT NURSE NOTE - NSFALLUNIVINTERV_ED_ALL_ED
Bed/Stretcher in lowest position, wheels locked, appropriate side rails in place/Call bell, personal items and telephone in reach/Instruct patient to call for assistance before getting out of bed/chair/stretcher/Non-slip footwear applied when patient is off stretcher/[place] to call system/Physically safe environment - no spills, clutter or unnecessary equipment/Purposeful proactive rounding/Room/bathroom lighting operational, light cord in reach

## 2025-06-26 NOTE — ED ADULT NURSE NOTE - CAS EDP DISCH TYPE
Group Home Rj Frye DO (PGY2)  patient still endorsing headache after reglan. will give tylenol and toradol, reassess Rj Frye DO (PGY2)  patient with elevated Cr to 2.11 - discussed with patient. shared decision making done regarding IVF hydration and repeat bloodwork via CDU observation. Patient requesting dispo home. Will give 1L IVF and d/c with PCP f/u.

## 2025-07-01 ENCOUNTER — NON-APPOINTMENT (OUTPATIENT)
Age: 35
End: 2025-07-01

## 2025-07-01 ENCOUNTER — EMERGENCY (EMERGENCY)
Facility: HOSPITAL | Age: 35
LOS: 1 days | End: 2025-07-01
Attending: EMERGENCY MEDICINE | Admitting: EMERGENCY MEDICINE
Payer: MEDICAID

## 2025-07-01 VITALS
HEART RATE: 81 BPM | WEIGHT: 256.4 LBS | RESPIRATION RATE: 20 BRPM | HEIGHT: 71 IN | DIASTOLIC BLOOD PRESSURE: 99 MMHG | TEMPERATURE: 98 F | SYSTOLIC BLOOD PRESSURE: 130 MMHG | OXYGEN SATURATION: 99 %

## 2025-07-01 DIAGNOSIS — Z90.79 ACQUIRED ABSENCE OF OTHER GENITAL ORGAN(S): Chronic | ICD-10-CM

## 2025-07-01 PROCEDURE — 99283 EMERGENCY DEPT VISIT LOW MDM: CPT | Mod: 25

## 2025-07-01 PROCEDURE — 72070 X-RAY EXAM THORAC SPINE 2VWS: CPT | Mod: 26

## 2025-07-01 PROCEDURE — 72070 X-RAY EXAM THORAC SPINE 2VWS: CPT

## 2025-07-01 PROCEDURE — 99284 EMERGENCY DEPT VISIT MOD MDM: CPT

## 2025-07-01 RX ORDER — CYCLOBENZAPRINE HYDROCHLORIDE 15 MG/1
1 CAPSULE, EXTENDED RELEASE ORAL
Qty: 12 | Refills: 0
Start: 2025-07-01

## 2025-07-01 RX ORDER — LIDOCAINE HYDROCHLORIDE 20 MG/ML
1 JELLY TOPICAL
Qty: 1 | Refills: 0
Start: 2025-07-01

## 2025-07-01 RX ORDER — KETOROLAC TROMETHAMINE 30 MG/ML
10 INJECTION, SOLUTION INTRAMUSCULAR; INTRAVENOUS ONCE
Refills: 0 | Status: DISCONTINUED | OUTPATIENT
Start: 2025-07-01 | End: 2025-07-01

## 2025-07-01 RX ORDER — LIDOCAINE HYDROCHLORIDE 20 MG/ML
1 JELLY TOPICAL ONCE
Refills: 0 | Status: COMPLETED | OUTPATIENT
Start: 2025-07-01 | End: 2025-07-01

## 2025-07-01 RX ORDER — CYCLOBENZAPRINE HYDROCHLORIDE 15 MG/1
10 CAPSULE, EXTENDED RELEASE ORAL ONCE
Refills: 0 | Status: COMPLETED | OUTPATIENT
Start: 2025-07-01 | End: 2025-07-01

## 2025-07-01 RX ORDER — KETOROLAC TROMETHAMINE 30 MG/ML
1 INJECTION, SOLUTION INTRAMUSCULAR; INTRAVENOUS
Qty: 12 | Refills: 0
Start: 2025-07-01

## 2025-07-01 RX ADMIN — CYCLOBENZAPRINE HYDROCHLORIDE 10 MILLIGRAM(S): 15 CAPSULE, EXTENDED RELEASE ORAL at 23:26

## 2025-07-01 RX ADMIN — KETOROLAC TROMETHAMINE 10 MILLIGRAM(S): 30 INJECTION, SOLUTION INTRAMUSCULAR; INTRAVENOUS at 23:26

## 2025-07-01 RX ADMIN — LIDOCAINE HYDROCHLORIDE 1 PATCH: 20 JELLY TOPICAL at 23:26

## 2025-07-01 NOTE — ED ADULT NURSE NOTE - NSFALLRISKINTERV_ED_ALL_ED
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
## 2025-07-01 NOTE — ED PROVIDER NOTE - PATIENT PORTAL LINK FT
You can access the FollowMyHealth Patient Portal offered by [facility] by registering at the following website: [email or web address]. By joining [employer]’s FollowMyHealth portal, you will also be able to view your health information using other applications (apps) compatible with our system.

## 2025-07-01 NOTE — ED PROVIDER NOTE - NSFOLLOWUPINSTRUCTIONS_ED_ALL_ED_FT
Follow up with the Spine Orthopedist  You may take the muscle relaxant with the toradol to help with pain. You may also apply the lidocaine patch.     Back Pain    Back pain is very common in adults. The cause of back pain is rarely dangerous and the pain often gets better over time. The cause of your back pain may not be known and may include strain of muscles or ligaments, degeneration of the spinal disks, or arthritis. Occasionally the pain may radiate down your leg(s). Over-the-counter medicines to reduce pain and inflammation are often the most helpful. Stretching and remaining active frequently helps the healing process.     SEEK IMMEDIATE MEDICAL CARE IF YOU HAVE ANY OF THE FOLLOWING SYMPTOMS: bowel or bladder control problems, unusual weakness or numbness in your arms or legs, nausea or vomiting, abdominal pain, fever, dizziness/lightheadedness.
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
## 2025-07-01 NOTE — ED ADULT NURSE NOTE - CAS ELECT INFOMATION PROVIDED
88 yo Female ,assisted resident  sent from [facility] assisted living for eval of AMS and  her leg wounds on R leg.  She has hx of CVA  with R hemiplegia, dvt anemia, phlebitis, afib, hld, melanoma of skin, gerd depression, diverticulitis, uti, hypothyroid, pt is poor historian, per ems to the triage rn they adv pt may be septic due to wounds on her leg    vs noted  on ABX, Bactrim  Labs reviewed  remains on LASIX    on emp ABX  ID following  monitor VS and Sat  assist with needs - ADL  pt is full code - dc planning - poss assisted - hospice discussion - GOC documented   SLP eval noted - PO Diet - HOB elev - asp prec - oral hygiene  prognosis guarded DC instructions

## 2025-07-01 NOTE — ED PROVIDER NOTE - CARE PROVIDER_API CALL
Guillermo Kruse  Orthopaedic Surgery  [address], [address]  [place] [address]  Phone: [phone]  Fax: [phone]  Follow Up Time: Routine

## 2025-07-01 NOTE — ED ADULT NURSE NOTE - ED STAT RN HANDOFF DETAILS
D/c instructions reviewed, verbalized understanding. VS stable. Pt ambulatory, left ED w/ aide in stable condition.

## 2025-07-03 NOTE — ED ADULT NURSE NOTE - CAS ELECT INFOMATION PROVIDED
I have reviewed the [place] [employer] website and there are no abberancies.      
DC instructions

## 2025-07-07 NOTE — ED BEHAVIORAL HEALTH ASSESSMENT NOTE - INTERRUPTED ATTEMPT:
Sent [employer] (1st Attempt) for the patient to call back and schedule the following:    Appointment type: MRI  Provider:  March  Return date: Next available  Specialty phone number: [phone] opt 1  Additional appointment(s) needed: MRA and return cardio follow-up  Additonal Notes: Clinic wants follow-up appointment with MD or RUSS soon after MRI/MRA   None known

## 2025-07-10 ENCOUNTER — EMERGENCY (EMERGENCY)
Facility: HOSPITAL | Age: 35
LOS: 1 days | End: 2025-07-10
Attending: STUDENT IN AN ORGANIZED HEALTH CARE EDUCATION/TRAINING PROGRAM | Admitting: STUDENT IN AN ORGANIZED HEALTH CARE EDUCATION/TRAINING PROGRAM
Payer: MEDICAID

## 2025-07-10 VITALS
HEART RATE: 86 BPM | TEMPERATURE: 98 F | OXYGEN SATURATION: 98 % | DIASTOLIC BLOOD PRESSURE: 84 MMHG | WEIGHT: 279.99 LBS | RESPIRATION RATE: 18 BRPM | HEIGHT: 71 IN | SYSTOLIC BLOOD PRESSURE: 104 MMHG

## 2025-07-10 VITALS
OXYGEN SATURATION: 97 % | RESPIRATION RATE: 17 BRPM | HEART RATE: 80 BPM | DIASTOLIC BLOOD PRESSURE: 78 MMHG | TEMPERATURE: 97 F | SYSTOLIC BLOOD PRESSURE: 115 MMHG

## 2025-07-10 DIAGNOSIS — Z90.79 ACQUIRED ABSENCE OF OTHER GENITAL ORGAN(S): Chronic | ICD-10-CM

## 2025-07-10 LAB
ALBUMIN SERPL ELPH-MCNC: 3.2 G/DL — LOW (ref 3.3–5)
ALP SERPL-CCNC: 56 U/L — SIGNIFICANT CHANGE UP (ref 40–120)
ALT FLD-CCNC: 24 U/L — SIGNIFICANT CHANGE UP (ref 12–78)
ANION GAP SERPL CALC-SCNC: 2 MMOL/L — LOW (ref 5–17)
APAP SERPL-MCNC: <2 UG/ML — LOW (ref 10–30)
APPEARANCE UR: CLEAR — SIGNIFICANT CHANGE UP
AST SERPL-CCNC: 8 U/L — LOW (ref 15–37)
BASOPHILS # BLD AUTO: 0.04 K/UL — SIGNIFICANT CHANGE UP (ref 0–0.2)
BASOPHILS NFR BLD AUTO: 0.4 % — SIGNIFICANT CHANGE UP (ref 0–2)
BILIRUB SERPL-MCNC: 0.3 MG/DL — SIGNIFICANT CHANGE UP (ref 0.2–1.2)
BILIRUB UR-MCNC: NEGATIVE — SIGNIFICANT CHANGE UP
BUN SERPL-MCNC: 9 MG/DL — SIGNIFICANT CHANGE UP (ref 7–23)
CALCIUM SERPL-MCNC: 8.6 MG/DL — SIGNIFICANT CHANGE UP (ref 8.5–10.1)
CHLORIDE SERPL-SCNC: 111 MMOL/L — HIGH (ref 96–108)
CO2 SERPL-SCNC: 26 MMOL/L — SIGNIFICANT CHANGE UP (ref 22–31)
COLOR SPEC: YELLOW — SIGNIFICANT CHANGE UP
CREAT SERPL-MCNC: 0.97 MG/DL — SIGNIFICANT CHANGE UP (ref 0.5–1.3)
DIFF PNL FLD: NEGATIVE — SIGNIFICANT CHANGE UP
EGFR: 104 ML/MIN/1.73M2 — SIGNIFICANT CHANGE UP
EGFR: 104 ML/MIN/1.73M2 — SIGNIFICANT CHANGE UP
EOSINOPHIL # BLD AUTO: 0.18 K/UL — SIGNIFICANT CHANGE UP (ref 0–0.5)
EOSINOPHIL NFR BLD AUTO: 1.6 % — SIGNIFICANT CHANGE UP (ref 0–6)
GLUCOSE SERPL-MCNC: 80 MG/DL — SIGNIFICANT CHANGE UP (ref 70–99)
GLUCOSE UR QL: NEGATIVE MG/DL — SIGNIFICANT CHANGE UP
HCT VFR BLD CALC: 45.2 % — SIGNIFICANT CHANGE UP (ref 39–50)
HGB BLD-MCNC: 15.1 G/DL — SIGNIFICANT CHANGE UP (ref 13–17)
IMM GRANULOCYTES # BLD AUTO: 0.03 K/UL — SIGNIFICANT CHANGE UP (ref 0–0.07)
IMM GRANULOCYTES NFR BLD AUTO: 0.3 % — SIGNIFICANT CHANGE UP (ref 0–0.9)
KETONES UR QL: NEGATIVE MG/DL — SIGNIFICANT CHANGE UP
LEUKOCYTE ESTERASE UR-ACNC: NEGATIVE — SIGNIFICANT CHANGE UP
LYMPHOCYTES # BLD AUTO: 3.58 K/UL — HIGH (ref 1–3.3)
LYMPHOCYTES NFR BLD AUTO: 31.7 % — SIGNIFICANT CHANGE UP (ref 13–44)
MCHC RBC-ENTMCNC: 26.4 PG — LOW (ref 27–34)
MCHC RBC-ENTMCNC: 33.4 G/DL — SIGNIFICANT CHANGE UP (ref 32–36)
MCV RBC AUTO: 79 FL — LOW (ref 80–100)
MONOCYTES # BLD AUTO: 0.77 K/UL — SIGNIFICANT CHANGE UP (ref 0–0.9)
MONOCYTES NFR BLD AUTO: 6.8 % — SIGNIFICANT CHANGE UP (ref 2–14)
NEUTROPHILS # BLD AUTO: 6.68 K/UL — SIGNIFICANT CHANGE UP (ref 1.8–7.4)
NEUTROPHILS NFR BLD AUTO: 59.2 % — SIGNIFICANT CHANGE UP (ref 43–77)
NITRITE UR-MCNC: NEGATIVE — SIGNIFICANT CHANGE UP
NRBC # BLD AUTO: 0 K/UL — SIGNIFICANT CHANGE UP (ref 0–0)
NRBC # FLD: 0 K/UL — SIGNIFICANT CHANGE UP (ref 0–0)
NRBC BLD AUTO-RTO: 0 /100 WBCS — SIGNIFICANT CHANGE UP (ref 0–0)
PCP SPEC-MCNC: SIGNIFICANT CHANGE UP
PH UR: 5.5 — SIGNIFICANT CHANGE UP (ref 5–8)
PLATELET # BLD AUTO: 247 K/UL — SIGNIFICANT CHANGE UP (ref 150–400)
PMV BLD: 11.7 FL — SIGNIFICANT CHANGE UP (ref 7–13)
POTASSIUM SERPL-MCNC: 4.4 MMOL/L — SIGNIFICANT CHANGE UP (ref 3.5–5.3)
POTASSIUM SERPL-SCNC: 4.4 MMOL/L — SIGNIFICANT CHANGE UP (ref 3.5–5.3)
PROT SERPL-MCNC: 6.3 G/DL — SIGNIFICANT CHANGE UP (ref 6–8.3)
PROT UR-MCNC: NEGATIVE MG/DL — SIGNIFICANT CHANGE UP
RBC # BLD: 5.72 M/UL — SIGNIFICANT CHANGE UP (ref 4.2–5.8)
RBC # FLD: 18.3 % — HIGH (ref 10.3–14.5)
SALICYLATES SERPL-MCNC: <1.7 MG/DL — LOW (ref 2.8–20)
SODIUM SERPL-SCNC: 139 MMOL/L — SIGNIFICANT CHANGE UP (ref 135–145)
SP GR SPEC: 1.01 — SIGNIFICANT CHANGE UP (ref 1–1.03)
TSH SERPL-MCNC: 1.29 UIU/ML — SIGNIFICANT CHANGE UP (ref 0.36–3.74)
UROBILINOGEN FLD QL: 0.2 MG/DL — SIGNIFICANT CHANGE UP (ref 0.2–1)
WBC # BLD: 11.28 K/UL — HIGH (ref 3.8–10.5)
WBC # FLD AUTO: 11.28 K/UL — HIGH (ref 3.8–10.5)

## 2025-07-10 PROCEDURE — 80307 DRUG TEST PRSMV CHEM ANLYZR: CPT

## 2025-07-10 PROCEDURE — 85025 COMPLETE CBC W/AUTO DIFF WBC: CPT

## 2025-07-10 PROCEDURE — 84443 ASSAY THYROID STIM HORMONE: CPT

## 2025-07-10 PROCEDURE — 90471 IMMUNIZATION ADMIN: CPT

## 2025-07-10 PROCEDURE — 99284 EMERGENCY DEPT VISIT MOD MDM: CPT | Mod: 95

## 2025-07-10 PROCEDURE — 81003 URINALYSIS AUTO W/O SCOPE: CPT

## 2025-07-10 PROCEDURE — 99285 EMERGENCY DEPT VISIT HI MDM: CPT

## 2025-07-10 PROCEDURE — 90715 TDAP VACCINE 7 YRS/> IM: CPT

## 2025-07-10 PROCEDURE — 80053 COMPREHEN METABOLIC PANEL: CPT

## 2025-07-10 PROCEDURE — 36415 COLL VENOUS BLD VENIPUNCTURE: CPT

## 2025-07-10 PROCEDURE — 93010 ELECTROCARDIOGRAM REPORT: CPT

## 2025-07-10 PROCEDURE — 96374 THER/PROPH/DIAG INJ IV PUSH: CPT

## 2025-07-10 PROCEDURE — 93005 ELECTROCARDIOGRAM TRACING: CPT

## 2025-07-10 PROCEDURE — 99285 EMERGENCY DEPT VISIT HI MDM: CPT | Mod: 25

## 2025-07-10 RX ORDER — CEPHALEXIN 250 MG/1
1 CAPSULE ORAL
Qty: 21 | Refills: 0
Start: 2025-07-10

## 2025-07-10 RX ORDER — CEPHALEXIN 250 MG/1
1 CAPSULE ORAL
Refills: 0
Start: 2025-07-10

## 2025-07-10 RX ORDER — CEFAZOLIN SODIUM IN 0.9 % NACL 3 G/100 ML
2000 INTRAVENOUS SOLUTION, PIGGYBACK (ML) INTRAVENOUS ONCE
Refills: 0 | Status: COMPLETED | OUTPATIENT
Start: 2025-07-10 | End: 2025-07-10

## 2025-07-10 RX ADMIN — Medication 100 MILLIGRAM(S): at 10:51

## 2025-07-15 NOTE — ED ADULT NURSE NOTE - CAS DISCH TRANSFER METHOD
Sent to patient portal.  Recent urine result is normal, no sexually transmitted infections.  Please follow-up with primary care physician for continuation of care.  Take care and stay safe. Private car

## 2025-07-24 NOTE — DISCHARGE NOTE PROVIDER - NSDCCPCAREPLAN_GEN_ALL_CORE_FT
--PRINCIPAL DISCHARGE DIAGNOSIS  Diagnosis: Urinary retention  Assessment and Plan of Treatment: You had mckeon catheter placed and subsequently had trial of void.  You have since been voiding without difficulty.  Please continue your flomax.  Follow up with your urologist in 1 week.      SECONDARY DISCHARGE DIAGNOSES  Diagnosis: Major depression  Assessment and Plan of Treatment: You were seen by psychiatry.  Please continue your psychiatric medications.    Diagnosis: T2DM (type 2 diabetes mellitus)  Assessment and Plan of Treatment: Continue metformin.    Diagnosis: Hypothyroidism  Assessment and Plan of Treatment: Continue synthroid.

## 2025-07-25 ENCOUNTER — APPOINTMENT (OUTPATIENT)
Dept: ORTHOPEDIC SURGERY | Facility: CLINIC | Age: 35
End: 2025-07-25
Payer: MEDICAID

## 2025-07-25 VITALS
HEART RATE: 83 BPM | BODY MASS INDEX: 35.98 KG/M2 | HEIGHT: 71 IN | DIASTOLIC BLOOD PRESSURE: 69 MMHG | SYSTOLIC BLOOD PRESSURE: 95 MMHG | WEIGHT: 257 LBS

## 2025-07-25 DIAGNOSIS — M54.6 PAIN IN THORACIC SPINE: ICD-10-CM

## 2025-07-25 DIAGNOSIS — M54.50 LOW BACK PAIN, UNSPECIFIED: ICD-10-CM

## 2025-07-25 PROCEDURE — 99204 OFFICE O/P NEW MOD 45 MIN: CPT

## 2025-07-31 ENCOUNTER — NON-APPOINTMENT (OUTPATIENT)
Age: 35
End: 2025-07-31

## 2025-08-01 ENCOUNTER — APPOINTMENT (OUTPATIENT)
Dept: UROLOGY | Facility: CLINIC | Age: 35
End: 2025-08-01
Payer: MEDICAID

## 2025-08-01 VITALS — DIASTOLIC BLOOD PRESSURE: 72 MMHG | SYSTOLIC BLOOD PRESSURE: 102 MMHG | HEART RATE: 71 BPM

## 2025-08-01 DIAGNOSIS — R33.9 RETENTION OF URINE, UNSPECIFIED: ICD-10-CM

## 2025-08-01 PROCEDURE — 99213 OFFICE O/P EST LOW 20 MIN: CPT

## 2025-08-22 ENCOUNTER — LABORATORY RESULT (OUTPATIENT)
Age: 35
End: 2025-08-22

## 2025-08-28 ENCOUNTER — APPOINTMENT (OUTPATIENT)
Dept: PULMONOLOGY | Facility: CLINIC | Age: 35
End: 2025-08-28
Payer: MEDICAID

## 2025-08-28 VITALS
HEIGHT: 70 IN | WEIGHT: 246 LBS | DIASTOLIC BLOOD PRESSURE: 76 MMHG | HEART RATE: 88 BPM | OXYGEN SATURATION: 100 % | RESPIRATION RATE: 16 BRPM | SYSTOLIC BLOOD PRESSURE: 110 MMHG | BODY MASS INDEX: 35.22 KG/M2

## 2025-08-28 DIAGNOSIS — E66.9 OBESITY, UNSPECIFIED: ICD-10-CM

## 2025-08-28 DIAGNOSIS — G47.33 OBSTRUCTIVE SLEEP APNEA (ADULT) (PEDIATRIC): ICD-10-CM

## 2025-08-28 PROCEDURE — 99213 OFFICE O/P EST LOW 20 MIN: CPT

## 2025-08-28 PROCEDURE — G2211 COMPLEX E/M VISIT ADD ON: CPT | Mod: NC

## 2025-08-28 RX ORDER — TIRZEPATIDE 5 MG/.5ML
5 INJECTION, SOLUTION SUBCUTANEOUS
Refills: 0 | Status: ACTIVE | COMMUNITY

## 2025-08-28 RX ORDER — DIVALPROEX SODIUM 500 MG/1
500 TABLET, DELAYED RELEASE ORAL
Refills: 0 | Status: ACTIVE | COMMUNITY

## 2025-08-28 RX ORDER — LITHIUM CARBONATE 300 MG/1
300 CAPSULE ORAL
Refills: 0 | Status: ACTIVE | COMMUNITY

## 2025-08-29 ENCOUNTER — EMERGENCY (EMERGENCY)
Facility: HOSPITAL | Age: 35
LOS: 1 days | End: 2025-08-29
Attending: INTERNAL MEDICINE | Admitting: INTERNAL MEDICINE
Payer: MEDICAID

## 2025-08-29 VITALS
WEIGHT: 270.07 LBS | HEIGHT: 71 IN | HEART RATE: 84 BPM | OXYGEN SATURATION: 98 % | DIASTOLIC BLOOD PRESSURE: 91 MMHG | TEMPERATURE: 98 F | SYSTOLIC BLOOD PRESSURE: 140 MMHG | RESPIRATION RATE: 18 BRPM

## 2025-08-29 DIAGNOSIS — Z90.79 ACQUIRED ABSENCE OF OTHER GENITAL ORGAN(S): Chronic | ICD-10-CM

## 2025-08-29 PROCEDURE — 99284 EMERGENCY DEPT VISIT MOD MDM: CPT

## 2025-08-29 PROCEDURE — 99283 EMERGENCY DEPT VISIT LOW MDM: CPT

## 2025-08-29 RX ORDER — IBUPROFEN 200 MG
600 TABLET ORAL ONCE
Refills: 0 | Status: COMPLETED | OUTPATIENT
Start: 2025-08-29 | End: 2025-08-29

## 2025-08-29 RX ORDER — CYCLOBENZAPRINE HYDROCHLORIDE 15 MG/1
10 CAPSULE, EXTENDED RELEASE ORAL ONCE
Refills: 0 | Status: COMPLETED | OUTPATIENT
Start: 2025-08-29 | End: 2025-08-29

## 2025-08-29 RX ORDER — LIDOCAINE HYDROCHLORIDE 20 MG/ML
1 JELLY TOPICAL ONCE
Refills: 0 | Status: COMPLETED | OUTPATIENT
Start: 2025-08-29 | End: 2025-08-29

## 2025-08-29 RX ADMIN — CYCLOBENZAPRINE HYDROCHLORIDE 10 MILLIGRAM(S): 15 CAPSULE, EXTENDED RELEASE ORAL at 18:39

## 2025-08-29 RX ADMIN — LIDOCAINE HYDROCHLORIDE 1 PATCH: 20 JELLY TOPICAL at 18:39

## 2025-08-29 RX ADMIN — Medication 600 MILLIGRAM(S): at 18:39

## 2025-08-31 ENCOUNTER — NON-APPOINTMENT (OUTPATIENT)
Age: 35
End: 2025-08-31

## 2025-08-31 ENCOUNTER — EMERGENCY (EMERGENCY)
Facility: HOSPITAL | Age: 35
LOS: 0 days | Discharge: HOME | End: 2025-09-01
Attending: FAMILY MEDICINE
Payer: MEDICAID

## 2025-08-31 VITALS
TEMPERATURE: 98 F | WEIGHT: 250 LBS | HEIGHT: 71 IN | HEART RATE: 98 BPM | DIASTOLIC BLOOD PRESSURE: 78 MMHG | SYSTOLIC BLOOD PRESSURE: 115 MMHG | OXYGEN SATURATION: 100 % | RESPIRATION RATE: 20 BRPM

## 2025-08-31 DIAGNOSIS — Z88.6 ALLERGY STATUS TO ANALGESIC AGENT: ICD-10-CM

## 2025-08-31 DIAGNOSIS — M54.50 LOW BACK PAIN, UNSPECIFIED: ICD-10-CM

## 2025-08-31 DIAGNOSIS — F79 UNSPECIFIED INTELLECTUAL DISABILITIES: ICD-10-CM

## 2025-08-31 DIAGNOSIS — Z88.8 ALLERGY STATUS TO OTHER DRUGS, MEDICAMENTS AND BIOLOGICAL SUBSTANCES: ICD-10-CM

## 2025-08-31 DIAGNOSIS — Z90.79 ACQUIRED ABSENCE OF OTHER GENITAL ORGAN(S): Chronic | ICD-10-CM

## 2025-08-31 LAB
BASOPHILS # BLD AUTO: 0.05 K/UL — SIGNIFICANT CHANGE UP (ref 0–0.2)
BASOPHILS NFR BLD AUTO: 0.4 % — SIGNIFICANT CHANGE UP (ref 0–2)
EOSINOPHIL # BLD AUTO: 0.32 K/UL — SIGNIFICANT CHANGE UP (ref 0–0.5)
EOSINOPHIL NFR BLD AUTO: 2.7 % — SIGNIFICANT CHANGE UP (ref 0–6)
HCT VFR BLD CALC: 46.2 % — SIGNIFICANT CHANGE UP (ref 39–50)
HGB BLD-MCNC: 14.3 G/DL — SIGNIFICANT CHANGE UP (ref 13–17)
IMM GRANULOCYTES # BLD AUTO: 0.04 K/UL — SIGNIFICANT CHANGE UP (ref 0–0.07)
IMM GRANULOCYTES NFR BLD AUTO: 0.3 % — SIGNIFICANT CHANGE UP (ref 0–0.9)
LYMPHOCYTES # BLD AUTO: 3.26 K/UL — SIGNIFICANT CHANGE UP (ref 1–3.3)
LYMPHOCYTES NFR BLD AUTO: 27.3 % — SIGNIFICANT CHANGE UP (ref 13–44)
MCHC RBC-ENTMCNC: 25.6 PG — LOW (ref 27–34)
MCHC RBC-ENTMCNC: 31 G/DL — LOW (ref 32–36)
MCV RBC AUTO: 82.8 FL — SIGNIFICANT CHANGE UP (ref 80–100)
MONOCYTES # BLD AUTO: 0.72 K/UL — SIGNIFICANT CHANGE UP (ref 0–0.9)
MONOCYTES NFR BLD AUTO: 6 % — SIGNIFICANT CHANGE UP (ref 2–14)
NEUTROPHILS # BLD AUTO: 7.55 K/UL — HIGH (ref 1.8–7.4)
NEUTROPHILS NFR BLD AUTO: 63.3 % — SIGNIFICANT CHANGE UP (ref 43–77)
NRBC # BLD AUTO: 0 K/UL — SIGNIFICANT CHANGE UP (ref 0–0)
NRBC # FLD: 0 K/UL — SIGNIFICANT CHANGE UP (ref 0–0)
NRBC BLD AUTO-RTO: 0 /100 WBCS — SIGNIFICANT CHANGE UP (ref 0–0)
PLATELET # BLD AUTO: 222 K/UL — SIGNIFICANT CHANGE UP (ref 150–400)
PMV BLD: 12 FL — SIGNIFICANT CHANGE UP (ref 7–13)
RBC # BLD: 5.58 M/UL — SIGNIFICANT CHANGE UP (ref 4.2–5.8)
RBC # FLD: 13.9 % — SIGNIFICANT CHANGE UP (ref 10.3–14.5)
WBC # BLD: 11.94 K/UL — HIGH (ref 3.8–10.5)
WBC # FLD AUTO: 11.94 K/UL — HIGH (ref 3.8–10.5)

## 2025-08-31 PROCEDURE — 81003 URINALYSIS AUTO W/O SCOPE: CPT

## 2025-08-31 PROCEDURE — 36415 COLL VENOUS BLD VENIPUNCTURE: CPT

## 2025-08-31 PROCEDURE — 99285 EMERGENCY DEPT VISIT HI MDM: CPT | Mod: 25

## 2025-08-31 PROCEDURE — 72100 X-RAY EXAM L-S SPINE 2/3 VWS: CPT

## 2025-08-31 PROCEDURE — 85025 COMPLETE CBC W/AUTO DIFF WBC: CPT

## 2025-08-31 PROCEDURE — 80053 COMPREHEN METABOLIC PANEL: CPT

## 2025-08-31 PROCEDURE — 93005 ELECTROCARDIOGRAM TRACING: CPT

## 2025-08-31 PROCEDURE — 72100 X-RAY EXAM L-S SPINE 2/3 VWS: CPT | Mod: 26

## 2025-08-31 PROCEDURE — 93010 ELECTROCARDIOGRAM REPORT: CPT

## 2025-08-31 PROCEDURE — 80307 DRUG TEST PRSMV CHEM ANLYZR: CPT

## 2025-08-31 PROCEDURE — 99284 EMERGENCY DEPT VISIT MOD MDM: CPT

## 2025-08-31 RX ORDER — CYCLOBENZAPRINE HYDROCHLORIDE 15 MG/1
10 CAPSULE, EXTENDED RELEASE ORAL ONCE
Refills: 0 | Status: COMPLETED | OUTPATIENT
Start: 2025-08-31 | End: 2025-08-31

## 2025-08-31 RX ORDER — LIDOCAINE HYDROCHLORIDE 20 MG/ML
1 JELLY TOPICAL ONCE
Refills: 0 | Status: COMPLETED | OUTPATIENT
Start: 2025-08-31 | End: 2025-08-31

## 2025-08-31 RX ADMIN — LIDOCAINE HYDROCHLORIDE 1 PATCH: 20 JELLY TOPICAL at 23:45

## 2025-08-31 RX ADMIN — CYCLOBENZAPRINE HYDROCHLORIDE 10 MILLIGRAM(S): 15 CAPSULE, EXTENDED RELEASE ORAL at 23:45

## 2025-09-01 VITALS
RESPIRATION RATE: 18 BRPM | HEART RATE: 72 BPM | SYSTOLIC BLOOD PRESSURE: 100 MMHG | OXYGEN SATURATION: 100 % | DIASTOLIC BLOOD PRESSURE: 60 MMHG | TEMPERATURE: 98 F

## 2025-09-01 LAB
ALBUMIN SERPL ELPH-MCNC: 3.5 G/DL — SIGNIFICANT CHANGE UP (ref 3.3–5)
ALP SERPL-CCNC: 74 U/L — SIGNIFICANT CHANGE UP (ref 40–120)
ALT FLD-CCNC: 36 U/L — SIGNIFICANT CHANGE UP (ref 12–78)
AMPHET UR-MCNC: NEGATIVE — SIGNIFICANT CHANGE UP
ANION GAP SERPL CALC-SCNC: 5 MMOL/L — SIGNIFICANT CHANGE UP (ref 5–17)
APPEARANCE UR: CLEAR — SIGNIFICANT CHANGE UP
AST SERPL-CCNC: 18 U/L — SIGNIFICANT CHANGE UP (ref 15–37)
BARBITURATES UR SCN-MCNC: NEGATIVE — SIGNIFICANT CHANGE UP
BENZODIAZ UR-MCNC: NEGATIVE — SIGNIFICANT CHANGE UP
BILIRUB SERPL-MCNC: 0.3 MG/DL — SIGNIFICANT CHANGE UP (ref 0.2–1.2)
BILIRUB UR-MCNC: NEGATIVE — SIGNIFICANT CHANGE UP
BUN SERPL-MCNC: 7 MG/DL — SIGNIFICANT CHANGE UP (ref 7–23)
CALCIUM SERPL-MCNC: 9.1 MG/DL — SIGNIFICANT CHANGE UP (ref 8.5–10.1)
CHLORIDE SERPL-SCNC: 108 MMOL/L — SIGNIFICANT CHANGE UP (ref 96–108)
CO2 SERPL-SCNC: 24 MMOL/L — SIGNIFICANT CHANGE UP (ref 22–31)
COCAINE METAB.OTHER UR-MCNC: NEGATIVE — SIGNIFICANT CHANGE UP
COLOR SPEC: YELLOW — SIGNIFICANT CHANGE UP
CREAT SERPL-MCNC: 0.86 MG/DL — SIGNIFICANT CHANGE UP (ref 0.5–1.3)
DIFF PNL FLD: NEGATIVE — SIGNIFICANT CHANGE UP
EGFR: 115 ML/MIN/1.73M2 — SIGNIFICANT CHANGE UP
EGFR: 115 ML/MIN/1.73M2 — SIGNIFICANT CHANGE UP
ETHANOL SERPL-MCNC: <10 MG/DL — SIGNIFICANT CHANGE UP (ref 0–10)
FENTANYL UR QL SCN: NEGATIVE — SIGNIFICANT CHANGE UP
GLUCOSE SERPL-MCNC: 94 MG/DL — SIGNIFICANT CHANGE UP (ref 70–99)
GLUCOSE UR QL: NEGATIVE MG/DL — SIGNIFICANT CHANGE UP
KETONES UR QL: ABNORMAL MG/DL
LEUKOCYTE ESTERASE UR-ACNC: NEGATIVE — SIGNIFICANT CHANGE UP
METHADONE UR-MCNC: NEGATIVE — SIGNIFICANT CHANGE UP
NITRITE UR-MCNC: NEGATIVE — SIGNIFICANT CHANGE UP
OPIATES UR-MCNC: NEGATIVE — SIGNIFICANT CHANGE UP
PCP SPEC-MCNC: SIGNIFICANT CHANGE UP
PCP UR-MCNC: NEGATIVE — SIGNIFICANT CHANGE UP
PH UR: 6 — SIGNIFICANT CHANGE UP (ref 5–8)
POTASSIUM SERPL-MCNC: 4.1 MMOL/L — SIGNIFICANT CHANGE UP (ref 3.5–5.3)
POTASSIUM SERPL-SCNC: 4.1 MMOL/L — SIGNIFICANT CHANGE UP (ref 3.5–5.3)
PROT SERPL-MCNC: 7 GM/DL — SIGNIFICANT CHANGE UP (ref 6–8.3)
PROT UR-MCNC: NEGATIVE MG/DL — SIGNIFICANT CHANGE UP
SODIUM SERPL-SCNC: 137 MMOL/L — SIGNIFICANT CHANGE UP (ref 135–145)
SP GR SPEC: 1.01 — SIGNIFICANT CHANGE UP (ref 1–1.03)
THC UR QL: NEGATIVE — SIGNIFICANT CHANGE UP
UROBILINOGEN FLD QL: 0.2 MG/DL — SIGNIFICANT CHANGE UP (ref 0.2–1)

## 2025-09-01 RX ORDER — CYCLOBENZAPRINE HYDROCHLORIDE 15 MG/1
1 CAPSULE, EXTENDED RELEASE ORAL
Qty: 12 | Refills: 0
Start: 2025-09-01 | End: 2025-09-04

## 2025-09-08 ENCOUNTER — APPOINTMENT (OUTPATIENT)
Dept: HEMATOLOGY ONCOLOGY | Facility: CLINIC | Age: 35
End: 2025-09-08
Payer: MEDICAID

## 2025-09-08 VITALS
SYSTOLIC BLOOD PRESSURE: 88 MMHG | WEIGHT: 245.9 LBS | HEART RATE: 101 BPM | BODY MASS INDEX: 35.28 KG/M2 | DIASTOLIC BLOOD PRESSURE: 63 MMHG | OXYGEN SATURATION: 98 % | RESPIRATION RATE: 16 BRPM | TEMPERATURE: 97.3 F

## 2025-09-08 DIAGNOSIS — C62.91 MALIGNANT NEOPLASM OF RIGHT TESTIS, UNSPECIFIED WHETHER DESCENDED OR UNDESCENDED: ICD-10-CM

## 2025-09-08 PROCEDURE — 99214 OFFICE O/P EST MOD 30 MIN: CPT

## 2025-09-08 RX ORDER — RISPERIDONE 4 MG/1
4 TABLET, FILM COATED ORAL
Refills: 0 | Status: ACTIVE | COMMUNITY
Start: 2025-09-08

## 2025-09-08 RX ORDER — PANTOPRAZOLE 20 MG/1
20 TABLET, DELAYED RELEASE ORAL DAILY
Refills: 0 | Status: ACTIVE | COMMUNITY
Start: 2025-09-08

## 2025-09-08 RX ORDER — LORATADINE 10 MG/1
10 TABLET ORAL DAILY
Refills: 0 | Status: ACTIVE | COMMUNITY
Start: 2025-09-08

## 2025-09-09 ENCOUNTER — EMERGENCY (EMERGENCY)
Facility: HOSPITAL | Age: 35
LOS: 0 days | Discharge: HOME | End: 2025-09-10
Attending: EMERGENCY MEDICINE
Payer: MEDICAID

## 2025-09-09 VITALS
RESPIRATION RATE: 17 BRPM | OXYGEN SATURATION: 100 % | DIASTOLIC BLOOD PRESSURE: 75 MMHG | HEART RATE: 81 BPM | TEMPERATURE: 98 F | SYSTOLIC BLOOD PRESSURE: 103 MMHG | WEIGHT: 248.9 LBS

## 2025-09-09 DIAGNOSIS — Z88.8 ALLERGY STATUS TO OTHER DRUGS, MEDICAMENTS AND BIOLOGICAL SUBSTANCES: ICD-10-CM

## 2025-09-09 DIAGNOSIS — M54.50 LOW BACK PAIN, UNSPECIFIED: ICD-10-CM

## 2025-09-09 DIAGNOSIS — R33.9 RETENTION OF URINE, UNSPECIFIED: ICD-10-CM

## 2025-09-09 DIAGNOSIS — Z88.6 ALLERGY STATUS TO ANALGESIC AGENT: ICD-10-CM

## 2025-09-09 PROCEDURE — 36415 COLL VENOUS BLD VENIPUNCTURE: CPT

## 2025-09-09 PROCEDURE — 81003 URINALYSIS AUTO W/O SCOPE: CPT

## 2025-09-09 PROCEDURE — 51702 INSERT TEMP BLADDER CATH: CPT

## 2025-09-09 PROCEDURE — 99285 EMERGENCY DEPT VISIT HI MDM: CPT

## 2025-09-09 PROCEDURE — 99283 EMERGENCY DEPT VISIT LOW MDM: CPT | Mod: 25

## 2025-09-09 PROCEDURE — 80053 COMPREHEN METABOLIC PANEL: CPT

## 2025-09-09 PROCEDURE — 85027 COMPLETE CBC AUTOMATED: CPT

## 2025-09-10 ENCOUNTER — EMERGENCY (EMERGENCY)
Facility: HOSPITAL | Age: 35
LOS: 1 days | Discharge: HOME | End: 2025-09-12
Attending: STUDENT IN AN ORGANIZED HEALTH CARE EDUCATION/TRAINING PROGRAM
Payer: MEDICAID

## 2025-09-10 VITALS — HEIGHT: 71 IN

## 2025-09-10 VITALS
RESPIRATION RATE: 17 BRPM | OXYGEN SATURATION: 100 % | HEART RATE: 80 BPM | SYSTOLIC BLOOD PRESSURE: 110 MMHG | TEMPERATURE: 98 F | DIASTOLIC BLOOD PRESSURE: 78 MMHG

## 2025-09-10 DIAGNOSIS — Z90.79 ACQUIRED ABSENCE OF OTHER GENITAL ORGAN(S): Chronic | ICD-10-CM

## 2025-09-10 LAB
ALBUMIN SERPL ELPH-MCNC: 3.5 G/DL — SIGNIFICANT CHANGE UP (ref 3.3–5)
ALP SERPL-CCNC: 70 U/L — SIGNIFICANT CHANGE UP (ref 40–120)
ALT FLD-CCNC: 20 U/L — SIGNIFICANT CHANGE UP (ref 12–78)
ANION GAP SERPL CALC-SCNC: 7 MMOL/L — SIGNIFICANT CHANGE UP (ref 5–17)
APPEARANCE UR: CLEAR — SIGNIFICANT CHANGE UP
AST SERPL-CCNC: 7 U/L — LOW (ref 15–37)
BILIRUB SERPL-MCNC: 0.2 MG/DL — SIGNIFICANT CHANGE UP (ref 0.2–1.2)
BILIRUB UR-MCNC: NEGATIVE — SIGNIFICANT CHANGE UP
BUN SERPL-MCNC: 7 MG/DL — SIGNIFICANT CHANGE UP (ref 7–23)
CALCIUM SERPL-MCNC: 9.1 MG/DL — SIGNIFICANT CHANGE UP (ref 8.5–10.1)
CHLORIDE SERPL-SCNC: 107 MMOL/L — SIGNIFICANT CHANGE UP (ref 96–108)
CO2 SERPL-SCNC: 25 MMOL/L — SIGNIFICANT CHANGE UP (ref 22–31)
COLOR SPEC: YELLOW — SIGNIFICANT CHANGE UP
CREAT SERPL-MCNC: 1.01 MG/DL — SIGNIFICANT CHANGE UP (ref 0.5–1.3)
DIFF PNL FLD: NEGATIVE — SIGNIFICANT CHANGE UP
EGFR: 99 ML/MIN/1.73M2 — SIGNIFICANT CHANGE UP
EGFR: 99 ML/MIN/1.73M2 — SIGNIFICANT CHANGE UP
GLUCOSE BLDC GLUCOMTR-MCNC: 110 MG/DL — HIGH (ref 70–99)
GLUCOSE BLDC GLUCOMTR-MCNC: 111 MG/DL — HIGH (ref 70–99)
GLUCOSE BLDC GLUCOMTR-MCNC: 71 MG/DL — SIGNIFICANT CHANGE UP (ref 70–99)
GLUCOSE SERPL-MCNC: 103 MG/DL — HIGH (ref 70–99)
GLUCOSE UR QL: NEGATIVE MG/DL — SIGNIFICANT CHANGE UP
HCT VFR BLD CALC: 47.9 % — SIGNIFICANT CHANGE UP (ref 39–50)
HGB BLD-MCNC: 14.6 G/DL — SIGNIFICANT CHANGE UP (ref 13–17)
KETONES UR QL: NEGATIVE MG/DL — SIGNIFICANT CHANGE UP
LEUKOCYTE ESTERASE UR-ACNC: NEGATIVE — SIGNIFICANT CHANGE UP
MCHC RBC-ENTMCNC: 25.1 PG — LOW (ref 27–34)
MCHC RBC-ENTMCNC: 30.5 G/DL — LOW (ref 32–36)
MCV RBC AUTO: 82.3 FL — SIGNIFICANT CHANGE UP (ref 80–100)
NITRITE UR-MCNC: NEGATIVE — SIGNIFICANT CHANGE UP
NRBC # BLD AUTO: 0 K/UL — SIGNIFICANT CHANGE UP (ref 0–0)
NRBC # FLD: 0 K/UL — SIGNIFICANT CHANGE UP (ref 0–0)
NRBC BLD AUTO-RTO: 0 /100 WBCS — SIGNIFICANT CHANGE UP (ref 0–0)
PH UR: 6 — SIGNIFICANT CHANGE UP (ref 5–8)
PLATELET # BLD AUTO: 220 K/UL — SIGNIFICANT CHANGE UP (ref 150–400)
PMV BLD: 11.2 FL — SIGNIFICANT CHANGE UP (ref 7–13)
POTASSIUM SERPL-MCNC: 3.9 MMOL/L — SIGNIFICANT CHANGE UP (ref 3.5–5.3)
POTASSIUM SERPL-SCNC: 3.9 MMOL/L — SIGNIFICANT CHANGE UP (ref 3.5–5.3)
PROT SERPL-MCNC: 7 GM/DL — SIGNIFICANT CHANGE UP (ref 6–8.3)
PROT UR-MCNC: NEGATIVE MG/DL — SIGNIFICANT CHANGE UP
RBC # BLD: 5.82 M/UL — HIGH (ref 4.2–5.8)
RBC # FLD: 13.8 % — SIGNIFICANT CHANGE UP (ref 10.3–14.5)
SODIUM SERPL-SCNC: 139 MMOL/L — SIGNIFICANT CHANGE UP (ref 135–145)
SP GR SPEC: 1.01 — SIGNIFICANT CHANGE UP (ref 1–1.03)
UROBILINOGEN FLD QL: 0.2 MG/DL — SIGNIFICANT CHANGE UP (ref 0.2–1)
WBC # BLD: 8.24 K/UL — SIGNIFICANT CHANGE UP (ref 3.8–10.5)
WBC # FLD AUTO: 8.24 K/UL — SIGNIFICANT CHANGE UP (ref 3.8–10.5)

## 2025-09-10 PROCEDURE — 99285 EMERGENCY DEPT VISIT HI MDM: CPT

## 2025-09-10 PROCEDURE — 99222 1ST HOSP IP/OBS MODERATE 55: CPT

## 2025-09-10 RX ORDER — MAGNESIUM, ALUMINUM HYDROXIDE 200-200 MG
30 TABLET,CHEWABLE ORAL EVERY 4 HOURS
Refills: 0 | Status: DISCONTINUED | OUTPATIENT
Start: 2025-09-10 | End: 2025-09-12

## 2025-09-10 RX ORDER — ACETAMINOPHEN 500 MG/5ML
650 LIQUID (ML) ORAL EVERY 6 HOURS
Refills: 0 | Status: DISCONTINUED | OUTPATIENT
Start: 2025-09-10 | End: 2025-09-12

## 2025-09-10 RX ORDER — LEVOTHYROXINE SODIUM 300 MCG
1 TABLET ORAL
Refills: 0 | DISCHARGE

## 2025-09-10 RX ORDER — RISPERIDONE 4 MG
4 TABLET ORAL
Refills: 0 | Status: DISCONTINUED | OUTPATIENT
Start: 2025-09-10 | End: 2025-09-12

## 2025-09-10 RX ORDER — LITHIUM CARBONATE 600 MG/1
2 CAPSULE, GELATIN COATED ORAL
Refills: 0 | DISCHARGE

## 2025-09-10 RX ORDER — FERROUS SULFATE 137(45) MG
325 TABLET, EXTENDED RELEASE ORAL AT BEDTIME
Refills: 0 | Status: DISCONTINUED | OUTPATIENT
Start: 2025-09-10 | End: 2025-09-12

## 2025-09-10 RX ORDER — SODIUM CHLORIDE 9 G/1000ML
1000 INJECTION, SOLUTION INTRAVENOUS
Refills: 0 | Status: DISCONTINUED | OUTPATIENT
Start: 2025-09-10 | End: 2025-09-12

## 2025-09-10 RX ORDER — ATORVASTATIN CALCIUM 80 MG/1
10 TABLET, FILM COATED ORAL AT BEDTIME
Refills: 0 | Status: DISCONTINUED | OUTPATIENT
Start: 2025-09-10 | End: 2025-09-12

## 2025-09-10 RX ORDER — FERROUS SULFATE 137(45) MG
1 TABLET, EXTENDED RELEASE ORAL
Refills: 0 | DISCHARGE

## 2025-09-10 RX ORDER — ATORVASTATIN CALCIUM 80 MG/1
1 TABLET, FILM COATED ORAL
Refills: 0 | DISCHARGE

## 2025-09-10 RX ORDER — CYCLOBENZAPRINE HYDROCHLORIDE 15 MG/1
10 CAPSULE, EXTENDED RELEASE ORAL THREE TIMES A DAY
Refills: 0 | Status: DISCONTINUED | OUTPATIENT
Start: 2025-09-10 | End: 2025-09-12

## 2025-09-10 RX ORDER — TESTOSTERONE 5.5 MG/1
1 GEL NASAL
Refills: 0 | DISCHARGE

## 2025-09-10 RX ORDER — MUPIROCIN CALCIUM 20 MG/G
1 CREAM TOPICAL
Refills: 0 | DISCHARGE

## 2025-09-10 RX ORDER — MELATONIN 5 MG
3 TABLET ORAL AT BEDTIME
Refills: 0 | Status: DISCONTINUED | OUTPATIENT
Start: 2025-09-10 | End: 2025-09-12

## 2025-09-10 RX ORDER — RISPERIDONE 4 MG
1 TABLET ORAL
Refills: 0 | Status: DISCONTINUED | OUTPATIENT
Start: 2025-09-10 | End: 2025-09-10

## 2025-09-10 RX ORDER — INSULIN LISPRO 100 U/ML
INJECTION, SOLUTION INTRAVENOUS; SUBCUTANEOUS AT BEDTIME
Refills: 0 | Status: DISCONTINUED | OUTPATIENT
Start: 2025-09-10 | End: 2025-09-12

## 2025-09-10 RX ORDER — SENNA 187 MG
2 TABLET ORAL
Refills: 0 | DISCHARGE

## 2025-09-10 RX ORDER — LITHIUM CARBONATE 600 MG/1
300 CAPSULE, GELATIN COATED ORAL AT BEDTIME
Refills: 0 | Status: DISCONTINUED | OUTPATIENT
Start: 2025-09-10 | End: 2025-09-12

## 2025-09-10 RX ORDER — NYSTATIN 100000 [USP'U]/G
1 CREAM TOPICAL
Refills: 0 | DISCHARGE

## 2025-09-10 RX ORDER — TAMSULOSIN HYDROCHLORIDE 0.4 MG/1
2 CAPSULE ORAL
Refills: 0 | DISCHARGE

## 2025-09-10 RX ORDER — DEXTROSE 50 % IN WATER 50 %
25 SYRINGE (ML) INTRAVENOUS ONCE
Refills: 0 | Status: DISCONTINUED | OUTPATIENT
Start: 2025-09-10 | End: 2025-09-12

## 2025-09-10 RX ORDER — TAMSULOSIN HYDROCHLORIDE 0.4 MG/1
0.8 CAPSULE ORAL AT BEDTIME
Refills: 0 | Status: DISCONTINUED | OUTPATIENT
Start: 2025-09-10 | End: 2025-09-12

## 2025-09-10 RX ORDER — DEXTROSE 50 % IN WATER 50 %
15 SYRINGE (ML) INTRAVENOUS ONCE
Refills: 0 | Status: DISCONTINUED | OUTPATIENT
Start: 2025-09-10 | End: 2025-09-12

## 2025-09-10 RX ORDER — LITHIUM CARBONATE 600 MG/1
1 CAPSULE, GELATIN COATED ORAL
Refills: 0 | DISCHARGE

## 2025-09-10 RX ORDER — METFORMIN HYDROCHLORIDE 850 MG/1
1 TABLET ORAL
Refills: 0 | DISCHARGE

## 2025-09-10 RX ORDER — SENNA 187 MG
2 TABLET ORAL AT BEDTIME
Refills: 0 | Status: DISCONTINUED | OUTPATIENT
Start: 2025-09-10 | End: 2025-09-12

## 2025-09-10 RX ORDER — GLUCAGON 3 MG/1
1 POWDER NASAL ONCE
Refills: 0 | Status: DISCONTINUED | OUTPATIENT
Start: 2025-09-10 | End: 2025-09-12

## 2025-09-10 RX ORDER — MUPIROCIN CALCIUM 20 MG/G
0 CREAM TOPICAL
Refills: 0 | DISCHARGE

## 2025-09-10 RX ORDER — LEVOTHYROXINE SODIUM 300 MCG
75 TABLET ORAL DAILY
Refills: 0 | Status: DISCONTINUED | OUTPATIENT
Start: 2025-09-10 | End: 2025-09-12

## 2025-09-10 RX ORDER — DEXTROSE 50 % IN WATER 50 %
12.5 SYRINGE (ML) INTRAVENOUS ONCE
Refills: 0 | Status: DISCONTINUED | OUTPATIENT
Start: 2025-09-10 | End: 2025-09-12

## 2025-09-10 RX ORDER — ONDANSETRON HCL/PF 4 MG/2 ML
4 VIAL (ML) INJECTION EVERY 8 HOURS
Refills: 0 | Status: DISCONTINUED | OUTPATIENT
Start: 2025-09-10 | End: 2025-09-12

## 2025-09-10 RX ORDER — RISPERIDONE 4 MG
1 TABLET ORAL
Refills: 0 | DISCHARGE

## 2025-09-10 RX ORDER — FINASTERIDE 1 MG/1
5 TABLET, FILM COATED ORAL DAILY
Refills: 0 | Status: DISCONTINUED | OUTPATIENT
Start: 2025-09-10 | End: 2025-09-12

## 2025-09-10 RX ORDER — INSULIN LISPRO 100 U/ML
INJECTION, SOLUTION INTRAVENOUS; SUBCUTANEOUS
Refills: 0 | Status: DISCONTINUED | OUTPATIENT
Start: 2025-09-10 | End: 2025-09-12

## 2025-09-10 RX ORDER — FLUPHENAZINE HCL 10 MG
75 TABLET ORAL
Refills: 0 | DISCHARGE

## 2025-09-10 RX ADMIN — Medication 20 MILLIGRAM(S): at 15:36

## 2025-09-10 RX ADMIN — Medication 1000 MILLIGRAM(S): at 22:21

## 2025-09-10 RX ADMIN — FINASTERIDE 5 MILLIGRAM(S): 1 TABLET, FILM COATED ORAL at 15:46

## 2025-09-10 RX ADMIN — Medication 325 MILLIGRAM(S): at 22:19

## 2025-09-10 RX ADMIN — Medication 500 MILLIGRAM(S): at 15:46

## 2025-09-10 RX ADMIN — LITHIUM CARBONATE 300 MILLIGRAM(S): 600 CAPSULE, GELATIN COATED ORAL at 22:20

## 2025-09-10 RX ADMIN — ATORVASTATIN CALCIUM 10 MILLIGRAM(S): 80 TABLET, FILM COATED ORAL at 22:19

## 2025-09-10 RX ADMIN — Medication 4 MILLIGRAM(S): at 22:17

## 2025-09-10 RX ADMIN — Medication 4 MILLIGRAM(S): at 15:36

## 2025-09-10 RX ADMIN — Medication 75 MICROGRAM(S): at 15:36

## 2025-09-10 RX ADMIN — Medication 2 TABLET(S): at 22:19

## 2025-09-11 ENCOUNTER — TRANSCRIPTION ENCOUNTER (OUTPATIENT)
Age: 35
End: 2025-09-11

## 2025-09-11 LAB
A1C WITH ESTIMATED AVERAGE GLUCOSE RESULT: 5.8 % — HIGH (ref 4–5.6)
ALBUMIN SERPL ELPH-MCNC: 3.4 G/DL — SIGNIFICANT CHANGE UP (ref 3.3–5)
ALP SERPL-CCNC: 67 U/L — SIGNIFICANT CHANGE UP (ref 40–120)
ALT FLD-CCNC: 21 U/L — SIGNIFICANT CHANGE UP (ref 12–78)
ANION GAP SERPL CALC-SCNC: 4 MMOL/L — LOW (ref 5–17)
AST SERPL-CCNC: 7 U/L — LOW (ref 15–37)
BILIRUB SERPL-MCNC: 0.3 MG/DL — SIGNIFICANT CHANGE UP (ref 0.2–1.2)
BUN SERPL-MCNC: 11 MG/DL — SIGNIFICANT CHANGE UP (ref 7–23)
CALCIUM SERPL-MCNC: 9.3 MG/DL — SIGNIFICANT CHANGE UP (ref 8.5–10.1)
CHLORIDE SERPL-SCNC: 107 MMOL/L — SIGNIFICANT CHANGE UP (ref 96–108)
CO2 SERPL-SCNC: 27 MMOL/L — SIGNIFICANT CHANGE UP (ref 22–31)
CREAT SERPL-MCNC: 0.94 MG/DL — SIGNIFICANT CHANGE UP (ref 0.5–1.3)
EGFR: 108 ML/MIN/1.73M2 — SIGNIFICANT CHANGE UP
EGFR: 108 ML/MIN/1.73M2 — SIGNIFICANT CHANGE UP
ESTIMATED AVERAGE GLUCOSE: 120 MG/DL — HIGH (ref 68–114)
GLUCOSE BLDC GLUCOMTR-MCNC: 103 MG/DL — HIGH (ref 70–99)
GLUCOSE BLDC GLUCOMTR-MCNC: 121 MG/DL — HIGH (ref 70–99)
GLUCOSE BLDC GLUCOMTR-MCNC: 121 MG/DL — HIGH (ref 70–99)
GLUCOSE BLDC GLUCOMTR-MCNC: 85 MG/DL — SIGNIFICANT CHANGE UP (ref 70–99)
GLUCOSE SERPL-MCNC: 104 MG/DL — HIGH (ref 70–99)
HCT VFR BLD CALC: 48.1 % — SIGNIFICANT CHANGE UP (ref 39–50)
HGB BLD-MCNC: 14.8 G/DL — SIGNIFICANT CHANGE UP (ref 13–17)
MCHC RBC-ENTMCNC: 25.2 PG — LOW (ref 27–34)
MCHC RBC-ENTMCNC: 30.8 G/DL — LOW (ref 32–36)
MCV RBC AUTO: 81.8 FL — SIGNIFICANT CHANGE UP (ref 80–100)
NRBC # BLD AUTO: 0 K/UL — SIGNIFICANT CHANGE UP (ref 0–0)
NRBC # FLD: 0 K/UL — SIGNIFICANT CHANGE UP (ref 0–0)
NRBC BLD AUTO-RTO: 0 /100 WBCS — SIGNIFICANT CHANGE UP (ref 0–0)
PLATELET # BLD AUTO: 230 K/UL — SIGNIFICANT CHANGE UP (ref 150–400)
PMV BLD: 11.6 FL — SIGNIFICANT CHANGE UP (ref 7–13)
POTASSIUM SERPL-MCNC: 4.3 MMOL/L — SIGNIFICANT CHANGE UP (ref 3.5–5.3)
POTASSIUM SERPL-SCNC: 4.3 MMOL/L — SIGNIFICANT CHANGE UP (ref 3.5–5.3)
PROT SERPL-MCNC: 7 GM/DL — SIGNIFICANT CHANGE UP (ref 6–8.3)
RBC # BLD: 5.88 M/UL — HIGH (ref 4.2–5.8)
RBC # FLD: 13.7 % — SIGNIFICANT CHANGE UP (ref 10.3–14.5)
SODIUM SERPL-SCNC: 138 MMOL/L — SIGNIFICANT CHANGE UP (ref 135–145)
WBC # BLD: 10.2 K/UL — SIGNIFICANT CHANGE UP (ref 3.8–10.5)
WBC # FLD AUTO: 10.2 K/UL — SIGNIFICANT CHANGE UP (ref 3.8–10.5)

## 2025-09-11 PROCEDURE — 99232 SBSQ HOSP IP/OBS MODERATE 35: CPT

## 2025-09-11 RX ORDER — FINASTERIDE 1 MG/1
1 TABLET, FILM COATED ORAL
Qty: 30 | Refills: 1
Start: 2025-09-11 | End: 2025-11-09

## 2025-09-11 RX ADMIN — LITHIUM CARBONATE 300 MILLIGRAM(S): 600 CAPSULE, GELATIN COATED ORAL at 22:35

## 2025-09-11 RX ADMIN — Medication 75 MICROGRAM(S): at 06:52

## 2025-09-11 RX ADMIN — Medication 2 TABLET(S): at 22:35

## 2025-09-11 RX ADMIN — Medication 500 MILLIGRAM(S): at 09:03

## 2025-09-11 RX ADMIN — Medication 4 MILLIGRAM(S): at 13:43

## 2025-09-11 RX ADMIN — Medication 4 MILLIGRAM(S): at 20:19

## 2025-09-11 RX ADMIN — TAMSULOSIN HYDROCHLORIDE 0.8 MILLIGRAM(S): 0.4 CAPSULE ORAL at 22:35

## 2025-09-11 RX ADMIN — Medication 20 MILLIGRAM(S): at 09:02

## 2025-09-11 RX ADMIN — Medication 0.3 MILLIGRAM(S): at 09:02

## 2025-09-11 RX ADMIN — ATORVASTATIN CALCIUM 10 MILLIGRAM(S): 80 TABLET, FILM COATED ORAL at 22:34

## 2025-09-11 RX ADMIN — FINASTERIDE 5 MILLIGRAM(S): 1 TABLET, FILM COATED ORAL at 09:07

## 2025-09-11 RX ADMIN — Medication 325 MILLIGRAM(S): at 22:35

## 2025-09-12 ENCOUNTER — TRANSCRIPTION ENCOUNTER (OUTPATIENT)
Age: 35
End: 2025-09-12

## 2025-09-12 VITALS
DIASTOLIC BLOOD PRESSURE: 77 MMHG | HEART RATE: 91 BPM | OXYGEN SATURATION: 97 % | RESPIRATION RATE: 18 BRPM | TEMPERATURE: 98 F | SYSTOLIC BLOOD PRESSURE: 119 MMHG

## 2025-09-12 LAB — GLUCOSE BLDC GLUCOMTR-MCNC: 96 MG/DL — SIGNIFICANT CHANGE UP (ref 70–99)

## 2025-09-12 PROCEDURE — 99239 HOSP IP/OBS DSCHRG MGMT >30: CPT

## 2025-09-12 RX ADMIN — Medication 20 MILLIGRAM(S): at 06:45

## 2025-09-12 RX ADMIN — FINASTERIDE 5 MILLIGRAM(S): 1 TABLET, FILM COATED ORAL at 07:33

## 2025-09-12 RX ADMIN — Medication 75 MICROGRAM(S): at 06:27

## 2025-09-12 RX ADMIN — Medication 500 MILLIGRAM(S): at 07:34

## 2025-09-16 ENCOUNTER — APPOINTMENT (OUTPATIENT)
Dept: CT IMAGING | Facility: CLINIC | Age: 35
End: 2025-09-16
Payer: MEDICAID

## 2025-09-16 ENCOUNTER — RESULT REVIEW (OUTPATIENT)
Age: 35
End: 2025-09-16

## 2025-09-16 PROCEDURE — 76376 3D RENDER W/INTRP POSTPROCES: CPT | Mod: 26

## 2025-09-16 PROCEDURE — 72131 CT LUMBAR SPINE W/O DYE: CPT | Mod: 26

## 2025-09-18 ENCOUNTER — NON-APPOINTMENT (OUTPATIENT)
Age: 35
End: 2025-09-18

## (undated) DEVICE — SUT BIOSYN 4-0 18" P-12

## (undated) DEVICE — SOL IRR POUR NS 0.9% 500ML

## (undated) DEVICE — LAP PAD 18 X 18"

## (undated) DEVICE — MARKING PEN W RULER

## (undated) DEVICE — SUT POLYSORB 3-0 30" V-20 UNDYED

## (undated) DEVICE — SYR CONTROL LUER LOK 10CC

## (undated) DEVICE — GLV 7 PROTEXIS (WHITE)

## (undated) DEVICE — STAPLER SKIN VISI-STAT 35 WIDE

## (undated) DEVICE — DRAPE 1/2 SHEET 40X57"

## (undated) DEVICE — PREP CHLORAPREP HI-LITE ORANGE 26ML

## (undated) DEVICE — GOWN TRIMAX LG

## (undated) DEVICE — VISITEC 4X4

## (undated) DEVICE — SPECIMEN CONTAINER 100ML

## (undated) DEVICE — FOR-ESU VALLEYLAB T7E14842DX: Type: DURABLE MEDICAL EQUIPMENT

## (undated) DEVICE — DRAIN PENROSE .25" X 18" LATEX

## (undated) DEVICE — POSITIONER FOAM EGG CRATE ULNAR 2PCS (PINK)

## (undated) DEVICE — SUT SOFSILK 2-0 30" V-20

## (undated) DEVICE — PACK GENERAL MINOR

## (undated) DEVICE — BLADE SCALPEL SAFETYLOCK #10

## (undated) DEVICE — GLV 8 PROTEXIS (WHITE)

## (undated) DEVICE — MEDICATION LABELS W MARKER

## (undated) DEVICE — SUT POLYSORB 2-0 30" V-20 UNDYED

## (undated) DEVICE — SUSPENSORY WITH LEG STRAPS XL

## (undated) DEVICE — DRSG STERISTRIPS 0.5 X 4"

## (undated) DEVICE — GLV 7.5 PROTEXIS (WHITE)

## (undated) DEVICE — DRAPE LIGHT HANDLE COVER (BLUE)

## (undated) DEVICE — SUSPENSORY WITH LEG STRAPS LARGE

## (undated) DEVICE — SUT SOFSILK 0 30" V-20

## (undated) DEVICE — SOL IRR POUR H2O 250ML

## (undated) DEVICE — DRAPE TOWEL BLUE 17" X 24"

## (undated) DEVICE — VENODYNE/SCD SLEEVE CALF LARGE

## (undated) DEVICE — DRSG CURITY GAUZE SPONGE 4 X 4" 12-PLY

## (undated) DEVICE — SUSPENSORY WITH LEG STRAPS MEDIUM

## (undated) DEVICE — GLV 6.5 PROTEXIS (WHITE)

## (undated) DEVICE — GLV 8.5 PROTEXIS (WHITE)

## (undated) DEVICE — WARMING BLANKET UPPER ADULT

## (undated) DEVICE — BLADE SCALPEL SAFETYLOCK #15

## (undated) DEVICE — SUT POLYSORB 0 18" MP UNDYED

## (undated) DEVICE — PREP BETADINE KIT